# Patient Record
Sex: MALE | Race: WHITE | HISPANIC OR LATINO | Employment: OTHER | URBAN - METROPOLITAN AREA
[De-identification: names, ages, dates, MRNs, and addresses within clinical notes are randomized per-mention and may not be internally consistent; named-entity substitution may affect disease eponyms.]

---

## 2021-08-18 ENCOUNTER — TELEPHONE (OUTPATIENT)
Dept: INTERNAL MEDICINE | Facility: CLINIC | Age: 66
End: 2021-08-18

## 2021-08-19 ENCOUNTER — TELEPHONE (OUTPATIENT)
Dept: FAMILY MEDICINE | Facility: CLINIC | Age: 66
End: 2021-08-19

## 2021-08-26 DIAGNOSIS — I10 HYPERTENSION, UNSPECIFIED TYPE: ICD-10-CM

## 2021-08-26 DIAGNOSIS — D50.9 IRON DEFICIENCY ANEMIA, UNSPECIFIED IRON DEFICIENCY ANEMIA TYPE: Primary | ICD-10-CM

## 2021-08-26 DIAGNOSIS — G25.81 RESTLESS LEG SYNDROME: ICD-10-CM

## 2021-08-26 RX ORDER — GABAPENTIN 100 MG/1
100 CAPSULE ORAL 3 TIMES DAILY
Qty: 90 CAPSULE | Refills: 11 | Status: SHIPPED | OUTPATIENT
Start: 2021-08-26 | End: 2022-09-07 | Stop reason: SDUPTHER

## 2021-08-26 RX ORDER — CLONAZEPAM 0.5 MG/1
0.5 TABLET, ORALLY DISINTEGRATING ORAL 2 TIMES DAILY PRN
Qty: 60 TABLET | Refills: 1 | Status: SHIPPED | OUTPATIENT
Start: 2021-08-26 | End: 2021-10-19 | Stop reason: ALTCHOICE

## 2021-08-27 ENCOUNTER — LAB VISIT (OUTPATIENT)
Dept: LAB | Facility: HOSPITAL | Age: 66
End: 2021-08-27
Attending: INTERNAL MEDICINE
Payer: MEDICARE

## 2021-08-27 DIAGNOSIS — D50.9 IRON DEFICIENCY ANEMIA, UNSPECIFIED IRON DEFICIENCY ANEMIA TYPE: ICD-10-CM

## 2021-08-27 DIAGNOSIS — G25.81 RESTLESS LEG SYNDROME: ICD-10-CM

## 2021-08-27 DIAGNOSIS — I10 HYPERTENSION, UNSPECIFIED TYPE: ICD-10-CM

## 2021-08-27 LAB
ALBUMIN SERPL BCP-MCNC: 4 G/DL (ref 3.5–5.2)
ALP SERPL-CCNC: 69 U/L (ref 55–135)
ALT SERPL W/O P-5'-P-CCNC: 20 U/L (ref 10–44)
ANION GAP SERPL CALC-SCNC: 10 MMOL/L (ref 8–16)
AST SERPL-CCNC: 16 U/L (ref 10–40)
BASOPHILS # BLD AUTO: 0.04 K/UL (ref 0–0.2)
BASOPHILS NFR BLD: 0.5 % (ref 0–1.9)
BILIRUB SERPL-MCNC: 0.6 MG/DL (ref 0.1–1)
BUN SERPL-MCNC: 16 MG/DL (ref 8–23)
CALCIUM SERPL-MCNC: 10.1 MG/DL (ref 8.7–10.5)
CHLORIDE SERPL-SCNC: 104 MMOL/L (ref 95–110)
CO2 SERPL-SCNC: 27 MMOL/L (ref 23–29)
CREAT SERPL-MCNC: 1.3 MG/DL (ref 0.5–1.4)
DIFFERENTIAL METHOD: ABNORMAL
EOSINOPHIL # BLD AUTO: 0.1 K/UL (ref 0–0.5)
EOSINOPHIL NFR BLD: 1.8 % (ref 0–8)
ERYTHROCYTE [DISTWIDTH] IN BLOOD BY AUTOMATED COUNT: 15.9 % (ref 11.5–14.5)
EST. GFR  (AFRICAN AMERICAN): >60 ML/MIN/1.73 M^2
EST. GFR  (NON AFRICAN AMERICAN): 57 ML/MIN/1.73 M^2
FERRITIN SERPL-MCNC: 125 NG/ML (ref 20–300)
GLUCOSE SERPL-MCNC: 124 MG/DL (ref 70–110)
HCT VFR BLD AUTO: 51.5 % (ref 40–54)
HGB BLD-MCNC: 17 G/DL (ref 14–18)
IMM GRANULOCYTES # BLD AUTO: 0.02 K/UL (ref 0–0.04)
IMM GRANULOCYTES NFR BLD AUTO: 0.3 % (ref 0–0.5)
LYMPHOCYTES # BLD AUTO: 2.7 K/UL (ref 1–4.8)
LYMPHOCYTES NFR BLD: 36.6 % (ref 18–48)
MAGNESIUM SERPL-MCNC: 2.2 MG/DL (ref 1.6–2.6)
MCH RBC QN AUTO: 32.3 PG (ref 27–31)
MCHC RBC AUTO-ENTMCNC: 33 G/DL (ref 32–36)
MCV RBC AUTO: 98 FL (ref 82–98)
MONOCYTES # BLD AUTO: 0.5 K/UL (ref 0.3–1)
MONOCYTES NFR BLD: 6.2 % (ref 4–15)
NEUTROPHILS # BLD AUTO: 4.1 K/UL (ref 1.8–7.7)
NEUTROPHILS NFR BLD: 54.6 % (ref 38–73)
NRBC BLD-RTO: 0 /100 WBC
PHOSPHATE SERPL-MCNC: 2.6 MG/DL (ref 2.7–4.5)
PLATELET # BLD AUTO: 252 K/UL (ref 150–450)
PMV BLD AUTO: 9.7 FL (ref 9.2–12.9)
POTASSIUM SERPL-SCNC: 4.4 MMOL/L (ref 3.5–5.1)
PROT SERPL-MCNC: 7.7 G/DL (ref 6–8.4)
RBC # BLD AUTO: 5.27 M/UL (ref 4.6–6.2)
SODIUM SERPL-SCNC: 141 MMOL/L (ref 136–145)
WBC # BLD AUTO: 7.41 K/UL (ref 3.9–12.7)

## 2021-08-27 PROCEDURE — 82728 ASSAY OF FERRITIN: CPT | Performed by: INTERNAL MEDICINE

## 2021-08-27 PROCEDURE — 85025 COMPLETE CBC W/AUTO DIFF WBC: CPT | Performed by: INTERNAL MEDICINE

## 2021-08-27 PROCEDURE — 36415 COLL VENOUS BLD VENIPUNCTURE: CPT | Performed by: INTERNAL MEDICINE

## 2021-08-27 PROCEDURE — 84466 ASSAY OF TRANSFERRIN: CPT | Performed by: INTERNAL MEDICINE

## 2021-08-27 PROCEDURE — 80053 COMPREHEN METABOLIC PANEL: CPT | Performed by: INTERNAL MEDICINE

## 2021-08-27 PROCEDURE — 84100 ASSAY OF PHOSPHORUS: CPT | Performed by: INTERNAL MEDICINE

## 2021-08-27 PROCEDURE — 83735 ASSAY OF MAGNESIUM: CPT | Performed by: INTERNAL MEDICINE

## 2021-08-28 LAB
IRON SERPL-MCNC: 130 UG/DL (ref 45–160)
SATURATED IRON: 40 % (ref 20–50)
TOTAL IRON BINDING CAPACITY: 329 UG/DL (ref 250–450)
TRANSFERRIN SERPL-MCNC: 222 MG/DL (ref 200–375)

## 2021-10-19 ENCOUNTER — LAB VISIT (OUTPATIENT)
Dept: LAB | Facility: HOSPITAL | Age: 66
End: 2021-10-19
Attending: FAMILY MEDICINE
Payer: MEDICARE

## 2021-10-19 ENCOUNTER — OFFICE VISIT (OUTPATIENT)
Dept: INTERNAL MEDICINE | Facility: CLINIC | Age: 66
End: 2021-10-19
Attending: FAMILY MEDICINE
Payer: MEDICARE

## 2021-10-19 VITALS
OXYGEN SATURATION: 96 % | HEART RATE: 98 BPM | WEIGHT: 157.75 LBS | DIASTOLIC BLOOD PRESSURE: 78 MMHG | SYSTOLIC BLOOD PRESSURE: 136 MMHG | BODY MASS INDEX: 21.37 KG/M2 | HEIGHT: 72 IN

## 2021-10-19 DIAGNOSIS — Z12.5 PROSTATE CANCER SCREENING: ICD-10-CM

## 2021-10-19 DIAGNOSIS — M25.519 SHOULDER PAIN, UNSPECIFIED CHRONICITY, UNSPECIFIED LATERALITY: ICD-10-CM

## 2021-10-19 DIAGNOSIS — Z87.891 PERSONAL HISTORY OF NICOTINE DEPENDENCE: ICD-10-CM

## 2021-10-19 DIAGNOSIS — Z12.11 COLON CANCER SCREENING: ICD-10-CM

## 2021-10-19 DIAGNOSIS — R73.9 ELEVATED BLOOD SUGAR: ICD-10-CM

## 2021-10-19 DIAGNOSIS — E78.5 HYPERLIPIDEMIA, UNSPECIFIED HYPERLIPIDEMIA TYPE: ICD-10-CM

## 2021-10-19 DIAGNOSIS — Z13.6 SCREENING FOR AAA (AORTIC ABDOMINAL ANEURYSM): ICD-10-CM

## 2021-10-19 DIAGNOSIS — Z00.00 ANNUAL PHYSICAL EXAM: Primary | ICD-10-CM

## 2021-10-19 DIAGNOSIS — H53.9 VISUAL DISTURBANCE: ICD-10-CM

## 2021-10-19 LAB
CHOLEST SERPL-MCNC: 229 MG/DL (ref 120–199)
CHOLEST/HDLC SERPL: 7.2 {RATIO} (ref 2–5)
COMPLEXED PSA SERPL-MCNC: 2.5 NG/ML (ref 0–4)
ESTIMATED AVG GLUCOSE: 103 MG/DL (ref 68–131)
HBA1C MFR BLD: 5.2 % (ref 4–5.6)
HDLC SERPL-MCNC: 32 MG/DL (ref 40–75)
HDLC SERPL: 14 % (ref 20–50)
LDLC SERPL CALC-MCNC: 157.4 MG/DL (ref 63–159)
NONHDLC SERPL-MCNC: 197 MG/DL
TRIGL SERPL-MCNC: 198 MG/DL (ref 30–150)

## 2021-10-19 PROCEDURE — 3008F BODY MASS INDEX DOCD: CPT | Mod: CPTII,S$GLB,, | Performed by: FAMILY MEDICINE

## 2021-10-19 PROCEDURE — 3078F PR MOST RECENT DIASTOLIC BLOOD PRESSURE < 80 MM HG: ICD-10-PCS | Mod: CPTII,S$GLB,, | Performed by: FAMILY MEDICINE

## 2021-10-19 PROCEDURE — 83036 HEMOGLOBIN GLYCOSYLATED A1C: CPT | Performed by: FAMILY MEDICINE

## 2021-10-19 PROCEDURE — 3075F PR MOST RECENT SYSTOLIC BLOOD PRESS GE 130-139MM HG: ICD-10-PCS | Mod: CPTII,S$GLB,, | Performed by: FAMILY MEDICINE

## 2021-10-19 PROCEDURE — 80061 LIPID PANEL: CPT | Performed by: FAMILY MEDICINE

## 2021-10-19 PROCEDURE — 1159F PR MEDICATION LIST DOCUMENTED IN MEDICAL RECORD: ICD-10-PCS | Mod: CPTII,S$GLB,, | Performed by: FAMILY MEDICINE

## 2021-10-19 PROCEDURE — 99999 PR PBB SHADOW E&M-EST. PATIENT-LVL IV: CPT | Mod: PBBFAC,,, | Performed by: FAMILY MEDICINE

## 2021-10-19 PROCEDURE — 1125F AMNT PAIN NOTED PAIN PRSNT: CPT | Mod: CPTII,S$GLB,, | Performed by: FAMILY MEDICINE

## 2021-10-19 PROCEDURE — 1101F PT FALLS ASSESS-DOCD LE1/YR: CPT | Mod: CPTII,S$GLB,, | Performed by: FAMILY MEDICINE

## 2021-10-19 PROCEDURE — 3008F PR BODY MASS INDEX (BMI) DOCUMENTED: ICD-10-PCS | Mod: CPTII,S$GLB,, | Performed by: FAMILY MEDICINE

## 2021-10-19 PROCEDURE — 84153 ASSAY OF PSA TOTAL: CPT | Performed by: FAMILY MEDICINE

## 2021-10-19 PROCEDURE — 3288F PR FALLS RISK ASSESSMENT DOCUMENTED: ICD-10-PCS | Mod: CPTII,S$GLB,, | Performed by: FAMILY MEDICINE

## 2021-10-19 PROCEDURE — 99204 OFFICE O/P NEW MOD 45 MIN: CPT | Mod: S$GLB,,, | Performed by: FAMILY MEDICINE

## 2021-10-19 PROCEDURE — 3288F FALL RISK ASSESSMENT DOCD: CPT | Mod: CPTII,S$GLB,, | Performed by: FAMILY MEDICINE

## 2021-10-19 PROCEDURE — 1159F MED LIST DOCD IN RCRD: CPT | Mod: CPTII,S$GLB,, | Performed by: FAMILY MEDICINE

## 2021-10-19 PROCEDURE — 3075F SYST BP GE 130 - 139MM HG: CPT | Mod: CPTII,S$GLB,, | Performed by: FAMILY MEDICINE

## 2021-10-19 PROCEDURE — 99204 PR OFFICE/OUTPT VISIT, NEW, LEVL IV, 45-59 MIN: ICD-10-PCS | Mod: S$GLB,,, | Performed by: FAMILY MEDICINE

## 2021-10-19 PROCEDURE — 1160F RVW MEDS BY RX/DR IN RCRD: CPT | Mod: CPTII,S$GLB,, | Performed by: FAMILY MEDICINE

## 2021-10-19 PROCEDURE — 3078F DIAST BP <80 MM HG: CPT | Mod: CPTII,S$GLB,, | Performed by: FAMILY MEDICINE

## 2021-10-19 PROCEDURE — 99999 PR PBB SHADOW E&M-EST. PATIENT-LVL IV: ICD-10-PCS | Mod: PBBFAC,,, | Performed by: FAMILY MEDICINE

## 2021-10-19 PROCEDURE — 1101F PR PT FALLS ASSESS DOC 0-1 FALLS W/OUT INJ PAST YR: ICD-10-PCS | Mod: CPTII,S$GLB,, | Performed by: FAMILY MEDICINE

## 2021-10-19 PROCEDURE — 36415 COLL VENOUS BLD VENIPUNCTURE: CPT | Performed by: FAMILY MEDICINE

## 2021-10-19 PROCEDURE — 1160F PR REVIEW ALL MEDS BY PRESCRIBER/CLIN PHARMACIST DOCUMENTED: ICD-10-PCS | Mod: CPTII,S$GLB,, | Performed by: FAMILY MEDICINE

## 2021-10-19 PROCEDURE — 1125F PR PAIN SEVERITY QUANTIFIED, PAIN PRESENT: ICD-10-PCS | Mod: CPTII,S$GLB,, | Performed by: FAMILY MEDICINE

## 2021-10-20 ENCOUNTER — TELEPHONE (OUTPATIENT)
Dept: INTERNAL MEDICINE | Facility: CLINIC | Age: 66
End: 2021-10-20

## 2021-10-20 DIAGNOSIS — E78.5 HYPERLIPIDEMIA, UNSPECIFIED HYPERLIPIDEMIA TYPE: Primary | ICD-10-CM

## 2021-10-20 DIAGNOSIS — Z12.11 COLON CANCER SCREENING: ICD-10-CM

## 2021-10-22 ENCOUNTER — TELEPHONE (OUTPATIENT)
Dept: CARDIOTHORACIC SURGERY | Facility: CLINIC | Age: 66
End: 2021-10-22

## 2021-10-22 ENCOUNTER — HOSPITAL ENCOUNTER (OUTPATIENT)
Dept: RADIOLOGY | Facility: HOSPITAL | Age: 66
Discharge: HOME | End: 2021-10-22
Attending: PHYSICIAN ASSISTANT
Payer: MEDICARE

## 2021-10-22 ENCOUNTER — OFFICE VISIT (OUTPATIENT)
Dept: ORTHOPEDICS | Facility: CLINIC | Age: 66
End: 2021-10-22
Attending: FAMILY MEDICINE
Payer: MEDICARE

## 2021-10-22 VITALS
HEART RATE: 81 BPM | WEIGHT: 158.38 LBS | BODY MASS INDEX: 21.45 KG/M2 | SYSTOLIC BLOOD PRESSURE: 117 MMHG | DIASTOLIC BLOOD PRESSURE: 67 MMHG | HEIGHT: 72 IN

## 2021-10-22 DIAGNOSIS — M25.511 RIGHT SHOULDER PAIN, UNSPECIFIED CHRONICITY: Primary | ICD-10-CM

## 2021-10-22 DIAGNOSIS — M25.511 RIGHT SHOULDER PAIN, UNSPECIFIED CHRONICITY: ICD-10-CM

## 2021-10-22 DIAGNOSIS — M25.519 SHOULDER PAIN, UNSPECIFIED CHRONICITY, UNSPECIFIED LATERALITY: ICD-10-CM

## 2021-10-22 PROCEDURE — 3288F FALL RISK ASSESSMENT DOCD: CPT | Mod: CPTII,S$GLB,, | Performed by: PHYSICIAN ASSISTANT

## 2021-10-22 PROCEDURE — 73030 XR SHOULDER COMPLETE 2 OR MORE VIEWS RIGHT: ICD-10-PCS | Mod: 26,RT,, | Performed by: RADIOLOGY

## 2021-10-22 PROCEDURE — 3074F SYST BP LT 130 MM HG: CPT | Mod: CPTII,S$GLB,, | Performed by: PHYSICIAN ASSISTANT

## 2021-10-22 PROCEDURE — 99203 OFFICE O/P NEW LOW 30 MIN: CPT | Mod: S$GLB,,, | Performed by: PHYSICIAN ASSISTANT

## 2021-10-22 PROCEDURE — 3008F PR BODY MASS INDEX (BMI) DOCUMENTED: ICD-10-PCS | Mod: CPTII,S$GLB,, | Performed by: PHYSICIAN ASSISTANT

## 2021-10-22 PROCEDURE — 3074F PR MOST RECENT SYSTOLIC BLOOD PRESSURE < 130 MM HG: ICD-10-PCS | Mod: CPTII,S$GLB,, | Performed by: PHYSICIAN ASSISTANT

## 2021-10-22 PROCEDURE — 1125F AMNT PAIN NOTED PAIN PRSNT: CPT | Mod: CPTII,S$GLB,, | Performed by: PHYSICIAN ASSISTANT

## 2021-10-22 PROCEDURE — 1159F PR MEDICATION LIST DOCUMENTED IN MEDICAL RECORD: ICD-10-PCS | Mod: CPTII,S$GLB,, | Performed by: PHYSICIAN ASSISTANT

## 2021-10-22 PROCEDURE — 3044F HG A1C LEVEL LT 7.0%: CPT | Mod: CPTII,S$GLB,, | Performed by: PHYSICIAN ASSISTANT

## 2021-10-22 PROCEDURE — 1101F PT FALLS ASSESS-DOCD LE1/YR: CPT | Mod: CPTII,S$GLB,, | Performed by: PHYSICIAN ASSISTANT

## 2021-10-22 PROCEDURE — 3008F BODY MASS INDEX DOCD: CPT | Mod: CPTII,S$GLB,, | Performed by: PHYSICIAN ASSISTANT

## 2021-10-22 PROCEDURE — 99203 PR OFFICE/OUTPT VISIT, NEW, LEVL III, 30-44 MIN: ICD-10-PCS | Mod: S$GLB,,, | Performed by: PHYSICIAN ASSISTANT

## 2021-10-22 PROCEDURE — 1160F PR REVIEW ALL MEDS BY PRESCRIBER/CLIN PHARMACIST DOCUMENTED: ICD-10-PCS | Mod: CPTII,S$GLB,, | Performed by: PHYSICIAN ASSISTANT

## 2021-10-22 PROCEDURE — 3078F DIAST BP <80 MM HG: CPT | Mod: CPTII,S$GLB,, | Performed by: PHYSICIAN ASSISTANT

## 2021-10-22 PROCEDURE — 1101F PR PT FALLS ASSESS DOC 0-1 FALLS W/OUT INJ PAST YR: ICD-10-PCS | Mod: CPTII,S$GLB,, | Performed by: PHYSICIAN ASSISTANT

## 2021-10-22 PROCEDURE — 1160F RVW MEDS BY RX/DR IN RCRD: CPT | Mod: CPTII,S$GLB,, | Performed by: PHYSICIAN ASSISTANT

## 2021-10-22 PROCEDURE — 99999 PR PBB SHADOW E&M-EST. PATIENT-LVL III: ICD-10-PCS | Mod: PBBFAC,,, | Performed by: PHYSICIAN ASSISTANT

## 2021-10-22 PROCEDURE — 73030 X-RAY EXAM OF SHOULDER: CPT | Mod: TC,RT

## 2021-10-22 PROCEDURE — 3078F PR MOST RECENT DIASTOLIC BLOOD PRESSURE < 80 MM HG: ICD-10-PCS | Mod: CPTII,S$GLB,, | Performed by: PHYSICIAN ASSISTANT

## 2021-10-22 PROCEDURE — 1125F PR PAIN SEVERITY QUANTIFIED, PAIN PRESENT: ICD-10-PCS | Mod: CPTII,S$GLB,, | Performed by: PHYSICIAN ASSISTANT

## 2021-10-22 PROCEDURE — 73030 X-RAY EXAM OF SHOULDER: CPT | Mod: 26,RT,, | Performed by: RADIOLOGY

## 2021-10-22 PROCEDURE — 1159F MED LIST DOCD IN RCRD: CPT | Mod: CPTII,S$GLB,, | Performed by: PHYSICIAN ASSISTANT

## 2021-10-22 PROCEDURE — 3044F PR MOST RECENT HEMOGLOBIN A1C LEVEL <7.0%: ICD-10-PCS | Mod: CPTII,S$GLB,, | Performed by: PHYSICIAN ASSISTANT

## 2021-10-22 PROCEDURE — 99999 PR PBB SHADOW E&M-EST. PATIENT-LVL III: CPT | Mod: PBBFAC,,, | Performed by: PHYSICIAN ASSISTANT

## 2021-10-22 PROCEDURE — 3288F PR FALLS RISK ASSESSMENT DOCUMENTED: ICD-10-PCS | Mod: CPTII,S$GLB,, | Performed by: PHYSICIAN ASSISTANT

## 2021-10-22 RX ORDER — MELOXICAM 15 MG/1
15 TABLET ORAL DAILY
Qty: 30 TABLET | Refills: 1 | Status: SHIPPED | OUTPATIENT
Start: 2021-10-22 | End: 2021-11-24

## 2021-10-26 ENCOUNTER — HOSPITAL ENCOUNTER (OUTPATIENT)
Dept: RADIOLOGY | Facility: HOSPITAL | Age: 66
Discharge: HOME | End: 2021-10-26
Attending: FAMILY MEDICINE
Payer: MEDICARE

## 2021-10-26 DIAGNOSIS — Z87.891 PERSONAL HISTORY OF NICOTINE DEPENDENCE: ICD-10-CM

## 2021-10-26 PROCEDURE — 71271 CT CHEST LUNG SCREENING LOW DOSE: ICD-10-PCS | Mod: 26,,, | Performed by: RADIOLOGY

## 2021-10-26 PROCEDURE — 71271 CT THORAX LUNG CANCER SCR C-: CPT | Mod: 26,,, | Performed by: RADIOLOGY

## 2021-10-26 PROCEDURE — 71271 CT THORAX LUNG CANCER SCR C-: CPT | Mod: TC

## 2021-10-29 ENCOUNTER — HOSPITAL ENCOUNTER (OUTPATIENT)
Dept: CARDIOLOGY | Facility: HOSPITAL | Age: 66
Discharge: HOME | End: 2021-10-29
Attending: FAMILY MEDICINE
Payer: MEDICARE

## 2021-10-29 ENCOUNTER — IMMUNIZATION (OUTPATIENT)
Dept: INTERNAL MEDICINE | Facility: CLINIC | Age: 66
End: 2021-10-29
Payer: MEDICARE

## 2021-10-29 DIAGNOSIS — Z13.6 SCREENING FOR AAA (AORTIC ABDOMINAL ANEURYSM): ICD-10-CM

## 2021-10-29 DIAGNOSIS — Z23 NEED FOR VACCINATION: Primary | ICD-10-CM

## 2021-10-29 LAB
ABDOMINAL IMA AP: 1.6 CM
ABDOMINAL IMA ED VEL: 0 CM/S
ABDOMINAL IMA PS VEL: 135 CM/S
ABDOMINAL IMA TRANS: 1.3 CM
ABDOMINAL INFRARENAL AORTA AP: 2 CM
ABDOMINAL INFRARENAL AORTA ED VEL: 0 CM/S
ABDOMINAL INFRARENAL AORTA PS VEL: 67 CM/S
ABDOMINAL INFRARENAL AORTA TRANS: 1.6 CM
ABDOMINAL JUXTARENAL AORTA AP: 2 CM
ABDOMINAL JUXTARENAL AORTA ED VEL: 15 CM/S
ABDOMINAL JUXTARENAL AORTA PS VEL: 69 CM/S
ABDOMINAL JUXTARENAL AORTA TRANS: 1.6 CM
ABDOMINAL LT COM ILIAC AP: 1 CM
ABDOMINAL LT COM ILIAC TRANS: 0.9 CM
ABDOMINAL LT COM ILIAC VEL: 152 CM/S
ABDOMINAL LT COM ILLIAC ED VEL: 0 CM/S
ABDOMINAL RT COM ILIAC AP: 1.1 CM
ABDOMINAL RT COM ILIAC TRANS: 0.8 CM
ABDOMINAL RT COM ILIAC VEL: 267 CM/S
ABDOMINAL RT COM ILLIAC ED VEL: 0 CM/S
ABDOMINAL SUPRARENAL AORTA AP: 2.3 CM
ABDOMINAL SUPRARENAL AORTA ED VEL: 18 CM/S
ABDOMINAL SUPRARENAL AORTA PS VEL: 73 CM/S
ABDOMINAL SUPRARENAL AORTA TRANS: 2 CM

## 2021-10-29 PROCEDURE — 0013A COVID-19, MRNA, LNP-S, PF, 100 MCG/0.5 ML DOSE VACCINE: CPT | Mod: PBBFAC | Performed by: FAMILY MEDICINE

## 2021-10-29 PROCEDURE — 93978 VASCULAR STUDY: CPT

## 2021-10-29 PROCEDURE — 91301 COVID-19, MRNA, LNP-S, PF, 100 MCG/0.5 ML DOSE VACCINE: CPT | Mod: PBBFAC | Performed by: FAMILY MEDICINE

## 2021-10-29 PROCEDURE — 93978 VASCULAR STUDY: CPT | Mod: 26,,, | Performed by: INTERNAL MEDICINE

## 2021-10-29 PROCEDURE — 93978 CV US ABDOMINAL AORTA EVALUATION (CUPID ONLY): ICD-10-PCS | Mod: 26,,, | Performed by: INTERNAL MEDICINE

## 2021-11-01 ENCOUNTER — TELEPHONE (OUTPATIENT)
Dept: ENDOSCOPY | Facility: HOSPITAL | Age: 66
End: 2021-11-01
Payer: MEDICARE

## 2021-11-08 ENCOUNTER — CLINICAL SUPPORT (OUTPATIENT)
Dept: REHABILITATION | Facility: HOSPITAL | Age: 66
End: 2021-11-08
Payer: MEDICARE

## 2021-11-08 DIAGNOSIS — M25.519 SHOULDER PAIN, UNSPECIFIED CHRONICITY, UNSPECIFIED LATERALITY: ICD-10-CM

## 2021-11-08 DIAGNOSIS — M25.511 RIGHT SHOULDER PAIN, UNSPECIFIED CHRONICITY: ICD-10-CM

## 2021-11-08 DIAGNOSIS — Z74.09 STIFFNESS DUE TO IMMOBILITY: ICD-10-CM

## 2021-11-08 DIAGNOSIS — M25.60 STIFFNESS DUE TO IMMOBILITY: ICD-10-CM

## 2021-11-08 DIAGNOSIS — R53.1 WEAKNESS: ICD-10-CM

## 2021-11-08 DIAGNOSIS — M79.601 PAIN OF RIGHT UPPER EXTREMITY: ICD-10-CM

## 2021-11-08 PROCEDURE — 97165 OT EVAL LOW COMPLEX 30 MIN: CPT | Mod: PN

## 2021-11-17 ENCOUNTER — CLINICAL SUPPORT (OUTPATIENT)
Dept: REHABILITATION | Facility: HOSPITAL | Age: 66
End: 2021-11-17
Payer: MEDICARE

## 2021-11-17 DIAGNOSIS — M79.601 PAIN OF RIGHT UPPER EXTREMITY: ICD-10-CM

## 2021-11-17 DIAGNOSIS — R53.1 WEAKNESS: ICD-10-CM

## 2021-11-17 DIAGNOSIS — M25.60 STIFFNESS DUE TO IMMOBILITY: ICD-10-CM

## 2021-11-17 DIAGNOSIS — Z74.09 STIFFNESS DUE TO IMMOBILITY: ICD-10-CM

## 2021-11-17 PROCEDURE — 97140 MANUAL THERAPY 1/> REGIONS: CPT | Mod: PN

## 2021-11-17 PROCEDURE — 97110 THERAPEUTIC EXERCISES: CPT | Mod: PN

## 2021-11-22 ENCOUNTER — OFFICE VISIT (OUTPATIENT)
Dept: INTERNAL MEDICINE | Facility: CLINIC | Age: 66
End: 2021-11-22
Attending: FAMILY MEDICINE
Payer: MEDICARE

## 2021-11-22 ENCOUNTER — CLINICAL SUPPORT (OUTPATIENT)
Dept: REHABILITATION | Facility: HOSPITAL | Age: 66
End: 2021-11-22
Payer: MEDICARE

## 2021-11-22 VITALS
OXYGEN SATURATION: 99 % | SYSTOLIC BLOOD PRESSURE: 136 MMHG | BODY MASS INDEX: 21.56 KG/M2 | HEART RATE: 77 BPM | HEIGHT: 72 IN | WEIGHT: 159.19 LBS | DIASTOLIC BLOOD PRESSURE: 84 MMHG

## 2021-11-22 DIAGNOSIS — R07.9 CHEST PAIN, UNSPECIFIED TYPE: ICD-10-CM

## 2021-11-22 DIAGNOSIS — E78.5 HYPERLIPIDEMIA, UNSPECIFIED HYPERLIPIDEMIA TYPE: Primary | ICD-10-CM

## 2021-11-22 DIAGNOSIS — I77.1 ILIAC ARTERY STENOSIS, RIGHT: ICD-10-CM

## 2021-11-22 DIAGNOSIS — Z74.09 STIFFNESS DUE TO IMMOBILITY: ICD-10-CM

## 2021-11-22 DIAGNOSIS — M79.601 PAIN OF RIGHT UPPER EXTREMITY: ICD-10-CM

## 2021-11-22 DIAGNOSIS — I25.10 CORONARY ARTERY CALCIFICATION SEEN ON CAT SCAN: ICD-10-CM

## 2021-11-22 DIAGNOSIS — R53.1 WEAKNESS: ICD-10-CM

## 2021-11-22 DIAGNOSIS — Z87.891 PERSONAL HISTORY OF NICOTINE DEPENDENCE: ICD-10-CM

## 2021-11-22 DIAGNOSIS — M25.60 STIFFNESS DUE TO IMMOBILITY: ICD-10-CM

## 2021-11-22 PROCEDURE — 97110 THERAPEUTIC EXERCISES: CPT | Mod: PN

## 2021-11-22 PROCEDURE — 99999 PR PBB SHADOW E&M-EST. PATIENT-LVL III: ICD-10-PCS | Mod: PBBFAC,,, | Performed by: FAMILY MEDICINE

## 2021-11-22 PROCEDURE — 99499 RISK ADDL DX/OHS AUDIT: ICD-10-PCS | Mod: S$GLB,,, | Performed by: FAMILY MEDICINE

## 2021-11-22 PROCEDURE — 99499 UNLISTED E&M SERVICE: CPT | Mod: S$GLB,,, | Performed by: FAMILY MEDICINE

## 2021-11-22 PROCEDURE — 99214 OFFICE O/P EST MOD 30 MIN: CPT | Mod: S$GLB,,, | Performed by: FAMILY MEDICINE

## 2021-11-22 PROCEDURE — 99214 PR OFFICE/OUTPT VISIT, EST, LEVL IV, 30-39 MIN: ICD-10-PCS | Mod: S$GLB,,, | Performed by: FAMILY MEDICINE

## 2021-11-22 PROCEDURE — 97140 MANUAL THERAPY 1/> REGIONS: CPT | Mod: PN

## 2021-11-22 PROCEDURE — 99999 PR PBB SHADOW E&M-EST. PATIENT-LVL III: CPT | Mod: PBBFAC,,, | Performed by: FAMILY MEDICINE

## 2021-11-22 RX ORDER — ASPIRIN 81 MG/1
81 TABLET ORAL DAILY
COMMUNITY
End: 2022-02-15

## 2021-11-22 RX ORDER — ATORVASTATIN CALCIUM 40 MG/1
40 TABLET, FILM COATED ORAL DAILY
Qty: 90 TABLET | Refills: 3 | Status: SHIPPED | OUTPATIENT
Start: 2021-11-22 | End: 2022-02-15 | Stop reason: SDUPTHER

## 2021-11-23 ENCOUNTER — HOSPITAL ENCOUNTER (OUTPATIENT)
Dept: CARDIOLOGY | Facility: CLINIC | Age: 66
Discharge: HOME | End: 2021-11-23
Attending: FAMILY MEDICINE
Payer: MEDICARE

## 2021-11-23 DIAGNOSIS — R07.9 CHEST PAIN, UNSPECIFIED TYPE: ICD-10-CM

## 2021-11-23 DIAGNOSIS — I25.10 CORONARY ARTERY CALCIFICATION SEEN ON CAT SCAN: ICD-10-CM

## 2021-11-23 PROCEDURE — 93005 EKG 12-LEAD: ICD-10-PCS | Mod: S$GLB,,, | Performed by: FAMILY MEDICINE

## 2021-11-23 PROCEDURE — 93005 ELECTROCARDIOGRAM TRACING: CPT | Mod: S$GLB,,, | Performed by: FAMILY MEDICINE

## 2021-11-23 PROCEDURE — 93010 ELECTROCARDIOGRAM REPORT: CPT | Mod: S$GLB,,, | Performed by: INTERNAL MEDICINE

## 2021-11-23 PROCEDURE — 93010 EKG 12-LEAD: ICD-10-PCS | Mod: S$GLB,,, | Performed by: INTERNAL MEDICINE

## 2021-11-24 ENCOUNTER — CLINICAL SUPPORT (OUTPATIENT)
Dept: REHABILITATION | Facility: HOSPITAL | Age: 66
End: 2021-11-24
Payer: MEDICARE

## 2021-11-24 DIAGNOSIS — M25.60 STIFFNESS DUE TO IMMOBILITY: ICD-10-CM

## 2021-11-24 DIAGNOSIS — M79.601 PAIN OF RIGHT UPPER EXTREMITY: ICD-10-CM

## 2021-11-24 DIAGNOSIS — R53.1 WEAKNESS: ICD-10-CM

## 2021-11-24 DIAGNOSIS — Z74.09 STIFFNESS DUE TO IMMOBILITY: ICD-10-CM

## 2021-11-24 PROCEDURE — 97110 THERAPEUTIC EXERCISES: CPT | Mod: PN

## 2021-11-24 PROCEDURE — 97140 MANUAL THERAPY 1/> REGIONS: CPT | Mod: PN

## 2021-11-26 ENCOUNTER — HOSPITAL ENCOUNTER (OUTPATIENT)
Dept: CARDIOLOGY | Facility: HOSPITAL | Age: 66
Discharge: HOME | End: 2021-11-26
Attending: FAMILY MEDICINE
Payer: MEDICARE

## 2021-11-26 VITALS — WEIGHT: 159 LBS | HEIGHT: 72 IN | BODY MASS INDEX: 21.54 KG/M2

## 2021-11-26 DIAGNOSIS — R07.9 CHEST PAIN, UNSPECIFIED TYPE: ICD-10-CM

## 2021-11-26 DIAGNOSIS — I25.10 CORONARY ARTERY CALCIFICATION SEEN ON CAT SCAN: ICD-10-CM

## 2021-11-26 LAB
AORTIC ROOT ANNULUS: 3.12 CM
AV INDEX (PROSTH): 0.82
AV MEAN GRADIENT: 4 MMHG
AV PEAK GRADIENT: 7 MMHG
AV VALVE AREA: 2.79 CM2
AV VELOCITY RATIO: 0.76
BSA FOR ECHO PROCEDURE: 1.91 M2
CV ECHO LV RWT: 0.4 CM
CV STRESS BASE HR: 86 BPM
DIASTOLIC BLOOD PRESSURE: 95 MMHG
DOP CALC AO PEAK VEL: 1.32 M/S
DOP CALC AO VTI: 25.86 CM
DOP CALC LVOT AREA: 3.4 CM2
DOP CALC LVOT DIAMETER: 2.08 CM
DOP CALC LVOT PEAK VEL: 1 M/S
DOP CALC LVOT STROKE VOLUME: 72.2 CM3
DOP CALC MV VTI: 17.37 CM
DOP CALCLVOT PEAK VEL VTI: 21.26 CM
E WAVE DECELERATION TIME: 133.34 MSEC
E/A RATIO: 0.81
E/E' RATIO: 7.3 M/S
ECHO LV POSTERIOR WALL: 0.96 CM (ref 0.6–1.1)
EJECTION FRACTION: 55 %
FRACTIONAL SHORTENING: 34 % (ref 28–44)
INTERVENTRICULAR SEPTUM: 0.97 CM (ref 0.6–1.1)
LA MAJOR: 4.13 CM
LA MINOR: 4.28 CM
LA WIDTH: 3.11 CM
LEFT ATRIUM SIZE: 2.83 CM
LEFT ATRIUM VOLUME INDEX MOD: 11.1 ML/M2
LEFT ATRIUM VOLUME INDEX: 16.3 ML/M2
LEFT ATRIUM VOLUME MOD: 21.49 CM3
LEFT ATRIUM VOLUME: 31.45 CM3
LEFT INTERNAL DIMENSION IN SYSTOLE: 3.17 CM (ref 2.1–4)
LEFT VENTRICLE DIASTOLIC VOLUME INDEX: 56.63 ML/M2
LEFT VENTRICLE DIASTOLIC VOLUME: 109.3 ML
LEFT VENTRICLE MASS INDEX: 85 G/M2
LEFT VENTRICLE SYSTOLIC VOLUME INDEX: 20.8 ML/M2
LEFT VENTRICLE SYSTOLIC VOLUME: 40.14 ML
LEFT VENTRICULAR INTERNAL DIMENSION IN DIASTOLE: 4.83 CM (ref 3.5–6)
LEFT VENTRICULAR MASS: 163.87 G
LV LATERAL E/E' RATIO: 5.62 M/S
LV SEPTAL E/E' RATIO: 10.43 M/S
MV MEAN GRADIENT: 1 MMHG
MV PEAK A VEL: 0.9 M/S
MV PEAK E VEL: 0.73 M/S
MV PEAK GRADIENT: 3 MMHG
MV STENOSIS PRESSURE HALF TIME: 38.67 MS
MV VALVE AREA BY CONTINUITY EQUATION: 4.16 CM2
MV VALVE AREA P 1/2 METHOD: 5.69 CM2
OHS CV CPX 1 MINUTE RECOVERY HEART RATE: 127 BPM
OHS CV CPX 85 PERCENT MAX PREDICTED HEART RATE MALE: 131
OHS CV CPX ESTIMATED METS: 10
OHS CV CPX MAX PREDICTED HEART RATE: 154
OHS CV CPX PATIENT IS FEMALE: 0
OHS CV CPX PATIENT IS MALE: 1
OHS CV CPX PEAK DIASTOLIC BLOOD PRESSURE: 110 MMHG
OHS CV CPX PEAK HEAR RATE: 155 BPM
OHS CV CPX PEAK RATE PRESSURE PRODUCT: NORMAL
OHS CV CPX PEAK SYSTOLIC BLOOD PRESSURE: 195 MMHG
OHS CV CPX PERCENT MAX PREDICTED HEART RATE ACHIEVED: 101
OHS CV CPX RATE PRESSURE PRODUCT PRESENTING: NORMAL
PISA TR MAX VEL: 2.51 M/S
PV PEAK VELOCITY: 1.55 CM/S
RA MAJOR: 4.77 CM
RA PRESSURE: 3 MMHG
RA WIDTH: 3.62 CM
RIGHT VENTRICULAR END-DIASTOLIC DIMENSION: 2.16 CM
RV TISSUE DOPPLER FREE WALL SYSTOLIC VELOCITY 1 (APICAL 4 CHAMBER VIEW): 13.81 CM/S
STRESS ANGINA INDEX: 0
STRESS ECHO POST EXERCISE DUR MIN: 8 MINUTES
STRESS ECHO POST EXERCISE DUR SEC: 19 SECONDS
SYSTOLIC BLOOD PRESSURE: 151 MMHG
TDI LATERAL: 0.13 M/S
TDI SEPTAL: 0.07 M/S
TDI: 0.1 M/S
TR MAX PG: 25 MMHG
TRICUSPID ANNULAR PLANE SYSTOLIC EXCURSION: 2.92 CM
TV REST PULMONARY ARTERY PRESSURE: 28 MMHG

## 2021-11-26 PROCEDURE — 93351 STRESS TTE COMPLETE: CPT | Mod: 26,,, | Performed by: INTERNAL MEDICINE

## 2021-11-26 PROCEDURE — 93320 DOPPLER ECHO COMPLETE: CPT | Mod: 26,,, | Performed by: INTERNAL MEDICINE

## 2021-11-26 PROCEDURE — 93351 STRESS ECHO (CUPID ONLY): ICD-10-PCS | Mod: 26,,, | Performed by: INTERNAL MEDICINE

## 2021-11-26 PROCEDURE — 93320 STRESS ECHO (CUPID ONLY): ICD-10-PCS | Mod: 26,,, | Performed by: INTERNAL MEDICINE

## 2021-11-26 PROCEDURE — 93325 STRESS ECHO (CUPID ONLY): ICD-10-PCS | Mod: 26,,, | Performed by: INTERNAL MEDICINE

## 2021-11-26 PROCEDURE — 93325 DOPPLER ECHO COLOR FLOW MAPG: CPT

## 2021-11-26 PROCEDURE — 93325 DOPPLER ECHO COLOR FLOW MAPG: CPT | Mod: 26,,, | Performed by: INTERNAL MEDICINE

## 2021-11-27 ENCOUNTER — TELEPHONE (OUTPATIENT)
Dept: INTERNAL MEDICINE | Facility: CLINIC | Age: 66
End: 2021-11-27
Payer: MEDICARE

## 2021-11-27 DIAGNOSIS — R94.39 POSITIVE CARDIAC STRESS TEST: Primary | ICD-10-CM

## 2021-11-28 ENCOUNTER — TELEPHONE (OUTPATIENT)
Dept: INTERNAL MEDICINE | Facility: CLINIC | Age: 66
End: 2021-11-28
Payer: MEDICARE

## 2021-11-29 ENCOUNTER — OFFICE VISIT (OUTPATIENT)
Dept: CARDIOLOGY | Facility: CLINIC | Age: 66
End: 2021-11-29
Attending: FAMILY MEDICINE
Payer: MEDICARE

## 2021-11-29 ENCOUNTER — PATIENT OUTREACH (OUTPATIENT)
Dept: ADMINISTRATIVE | Facility: OTHER | Age: 66
End: 2021-11-29
Payer: MEDICARE

## 2021-11-29 VITALS
HEART RATE: 85 BPM | DIASTOLIC BLOOD PRESSURE: 70 MMHG | BODY MASS INDEX: 21.77 KG/M2 | WEIGHT: 160.69 LBS | RESPIRATION RATE: 20 BRPM | SYSTOLIC BLOOD PRESSURE: 144 MMHG | HEIGHT: 72 IN

## 2021-11-29 DIAGNOSIS — I10 PRIMARY HYPERTENSION: ICD-10-CM

## 2021-11-29 DIAGNOSIS — R94.31 ABNORMAL ELECTROCARDIOGRAM (ECG) (EKG): ICD-10-CM

## 2021-11-29 DIAGNOSIS — R94.39 POSITIVE CARDIAC STRESS TEST: ICD-10-CM

## 2021-11-29 DIAGNOSIS — I25.10 CORONARY ARTERY DISEASE INVOLVING NATIVE CORONARY ARTERY OF NATIVE HEART WITHOUT ANGINA PECTORIS: Primary | ICD-10-CM

## 2021-11-29 DIAGNOSIS — E78.5 HYPERLIPIDEMIA, UNSPECIFIED HYPERLIPIDEMIA TYPE: ICD-10-CM

## 2021-11-29 PROCEDURE — 99999 PR PBB SHADOW E&M-EST. PATIENT-LVL III: ICD-10-PCS | Mod: PBBFAC,,, | Performed by: INTERNAL MEDICINE

## 2021-11-29 PROCEDURE — 99205 OFFICE O/P NEW HI 60 MIN: CPT | Mod: S$GLB,,, | Performed by: INTERNAL MEDICINE

## 2021-11-29 PROCEDURE — 99999 PR PBB SHADOW E&M-EST. PATIENT-LVL III: CPT | Mod: PBBFAC,,, | Performed by: INTERNAL MEDICINE

## 2021-11-29 PROCEDURE — 99205 PR OFFICE/OUTPT VISIT, NEW, LEVL V, 60-74 MIN: ICD-10-PCS | Mod: S$GLB,,, | Performed by: INTERNAL MEDICINE

## 2021-11-29 RX ORDER — CARVEDILOL 6.25 MG/1
6.25 TABLET ORAL 2 TIMES DAILY WITH MEALS
Qty: 60 TABLET | Refills: 6 | Status: SHIPPED | OUTPATIENT
Start: 2021-11-29 | End: 2022-02-15 | Stop reason: SDUPTHER

## 2021-12-03 ENCOUNTER — PATIENT OUTREACH (OUTPATIENT)
Dept: ADMINISTRATIVE | Facility: HOSPITAL | Age: 66
End: 2021-12-03
Payer: MEDICARE

## 2021-12-03 ENCOUNTER — HOSPITAL ENCOUNTER (OUTPATIENT)
Dept: RADIOLOGY | Facility: HOSPITAL | Age: 66
Discharge: HOME | End: 2021-12-03
Attending: INTERNAL MEDICINE
Payer: MEDICARE

## 2021-12-03 VITALS
SYSTOLIC BLOOD PRESSURE: 120 MMHG | DIASTOLIC BLOOD PRESSURE: 64 MMHG | OXYGEN SATURATION: 100 % | RESPIRATION RATE: 18 BRPM | HEART RATE: 62 BPM

## 2021-12-03 DIAGNOSIS — R94.31 ABNORMAL ELECTROCARDIOGRAM (ECG) (EKG): ICD-10-CM

## 2021-12-03 PROCEDURE — 25500020 PHARM REV CODE 255: Performed by: INTERNAL MEDICINE

## 2021-12-03 PROCEDURE — 75574 CT ANGIO HRT W/3D IMAGE: CPT | Mod: 26,,, | Performed by: RADIOLOGY

## 2021-12-03 PROCEDURE — 75574 CTA CARDIAC: ICD-10-PCS | Mod: 26,,, | Performed by: RADIOLOGY

## 2021-12-03 PROCEDURE — 75574 CT ANGIO HRT W/3D IMAGE: CPT | Mod: TC

## 2021-12-03 RX ORDER — NITROGLYCERIN 0.4 MG/1
0.4 TABLET SUBLINGUAL EVERY 5 MIN PRN
Status: DISCONTINUED | OUTPATIENT
Start: 2021-12-03 | End: 2021-12-04 | Stop reason: HOSPADM

## 2021-12-03 RX ORDER — METOPROLOL TARTRATE 1 MG/ML
30 INJECTION, SOLUTION INTRAVENOUS EVERY 5 MIN PRN
Status: DISCONTINUED | OUTPATIENT
Start: 2021-12-03 | End: 2021-12-04 | Stop reason: HOSPADM

## 2021-12-03 RX ADMIN — IOHEXOL 100 ML: 350 INJECTION, SOLUTION INTRAVENOUS at 09:12

## 2021-12-09 ENCOUNTER — TELEPHONE (OUTPATIENT)
Dept: CARDIOLOGY | Facility: CLINIC | Age: 66
End: 2021-12-09
Payer: MEDICARE

## 2021-12-17 ENCOUNTER — TELEPHONE (OUTPATIENT)
Dept: ENDOSCOPY | Facility: HOSPITAL | Age: 66
End: 2021-12-17
Payer: MEDICARE

## 2021-12-20 ENCOUNTER — TELEPHONE (OUTPATIENT)
Dept: GASTROENTEROLOGY | Facility: CLINIC | Age: 66
End: 2021-12-20
Payer: MEDICARE

## 2021-12-22 ENCOUNTER — CLINICAL SUPPORT (OUTPATIENT)
Dept: REHABILITATION | Facility: HOSPITAL | Age: 66
End: 2021-12-22
Payer: MEDICARE

## 2021-12-22 DIAGNOSIS — Z74.09 STIFFNESS DUE TO IMMOBILITY: ICD-10-CM

## 2021-12-22 DIAGNOSIS — M25.60 STIFFNESS DUE TO IMMOBILITY: ICD-10-CM

## 2021-12-22 DIAGNOSIS — R53.1 WEAKNESS: ICD-10-CM

## 2021-12-22 DIAGNOSIS — M79.601 PAIN OF RIGHT UPPER EXTREMITY: ICD-10-CM

## 2021-12-22 PROCEDURE — 97140 MANUAL THERAPY 1/> REGIONS: CPT | Mod: PN

## 2021-12-22 PROCEDURE — 97110 THERAPEUTIC EXERCISES: CPT | Mod: PN

## 2021-12-30 ENCOUNTER — TELEPHONE (OUTPATIENT)
Dept: ENDOSCOPY | Facility: HOSPITAL | Age: 66
End: 2021-12-30
Payer: MEDICARE

## 2021-12-30 DIAGNOSIS — Z01.818 PRE-OP TESTING: ICD-10-CM

## 2022-01-03 ENCOUNTER — TELEPHONE (OUTPATIENT)
Dept: ENDOSCOPY | Facility: HOSPITAL | Age: 67
End: 2022-01-03
Payer: MEDICARE

## 2022-01-03 NOTE — TELEPHONE ENCOUNTER
Left message with assistance of  Shell [id number] instructing patient to call dept @ [phone] between 8am-4pm.    Arrival time to be given @ 0900  (Message sent via [facility] portal)

## 2022-01-03 NOTE — TELEPHONE ENCOUNTER
Spoke with patient with assistance of  Shiv [id number]  about arrival time @ 0910  Covid test =  Boosted     Prep instructions reviewed: the day before the procedure, follow a clear liquid diet all day, then start the first 1/2 of prep at 5pm and take 2nd 1/2 of prep @0410  Pt must be completely NPO when prep completed @ 0610             Medications: Do not take Insulin or oral diabetic medications the day of the procedure.  Take as prescribed: heart, seizure and blood pressure medication in the morning with a sip of water (less than an ounce).  Take any breathing medications and bring inhalers to hospital with you Leave all valuables and jewelry at home.     Wear comfortable clothes to procedure to change into hospital gown You cannot drive for 24 hours after your procedure because you will receive sedation for your procedure to make you comfortable.  A ride must be provided at discharge.

## 2022-01-05 ENCOUNTER — HOSPITAL ENCOUNTER (OUTPATIENT)
Facility: HOSPITAL | Age: 67
Discharge: HOME | End: 2022-01-05
Attending: INTERNAL MEDICINE | Admitting: INTERNAL MEDICINE
Payer: MEDICARE

## 2022-01-05 ENCOUNTER — ANESTHESIA (OUTPATIENT)
Dept: ENDOSCOPY | Facility: HOSPITAL | Age: 67
End: 2022-01-05

## 2022-01-05 ENCOUNTER — ANESTHESIA EVENT (OUTPATIENT)
Dept: ENDOSCOPY | Facility: HOSPITAL | Age: 67
End: 2022-01-05

## 2022-01-05 VITALS
HEIGHT: 72 IN | TEMPERATURE: 98 F | SYSTOLIC BLOOD PRESSURE: 105 MMHG | DIASTOLIC BLOOD PRESSURE: 56 MMHG | WEIGHT: 176 LBS | BODY MASS INDEX: 23.84 KG/M2 | HEART RATE: 68 BPM | RESPIRATION RATE: 15 BRPM | OXYGEN SATURATION: 98 %

## 2022-01-05 DIAGNOSIS — Z12.11 COLON CANCER SCREENING: ICD-10-CM

## 2022-01-05 PROBLEM — K63.5 COLON POLYPS: Status: ACTIVE | Noted: 2022-01-05

## 2022-01-05 PROCEDURE — 27201089 HC SNARE, DISP (ANY): Performed by: INTERNAL MEDICINE

## 2022-01-05 PROCEDURE — 45385 COLONOSCOPY W/LESION REMOVAL: CPT | Mod: PT | Performed by: INTERNAL MEDICINE

## 2022-01-05 PROCEDURE — 63600175 PHARM REV CODE 636 W HCPCS: Performed by: NURSE ANESTHETIST, CERTIFIED REGISTERED

## 2022-01-05 PROCEDURE — 25000003 PHARM REV CODE 250: Performed by: INTERNAL MEDICINE

## 2022-01-05 PROCEDURE — 88305 TISSUE EXAM BY PATHOLOGIST: CPT | Mod: 26,,, | Performed by: PATHOLOGY

## 2022-01-05 PROCEDURE — 45385 PR COLONOSCOPY,REMV LESN,SNARE: ICD-10-PCS | Mod: PT,,, | Performed by: INTERNAL MEDICINE

## 2022-01-05 PROCEDURE — 45385 COLONOSCOPY W/LESION REMOVAL: CPT | Mod: PT,,, | Performed by: INTERNAL MEDICINE

## 2022-01-05 PROCEDURE — 37000009 HC ANESTHESIA EA ADD 15 MINS: Performed by: INTERNAL MEDICINE

## 2022-01-05 PROCEDURE — 27202363 HC INJECTION AGENT, SUBMUCOSAL, ANY: Performed by: INTERNAL MEDICINE

## 2022-01-05 PROCEDURE — 88305 TISSUE EXAM BY PATHOLOGIST: ICD-10-PCS | Mod: 26,,, | Performed by: PATHOLOGY

## 2022-01-05 PROCEDURE — 37000008 HC ANESTHESIA 1ST 15 MINUTES: Performed by: INTERNAL MEDICINE

## 2022-01-05 PROCEDURE — 27201028 HC NEEDLE, SCLERO: Performed by: INTERNAL MEDICINE

## 2022-01-05 PROCEDURE — 25000003 PHARM REV CODE 250: Performed by: NURSE ANESTHETIST, CERTIFIED REGISTERED

## 2022-01-05 PROCEDURE — 88305 TISSUE EXAM BY PATHOLOGIST: CPT | Performed by: PATHOLOGY

## 2022-01-05 RX ORDER — PROPOFOL 10 MG/ML
VIAL (ML) INTRAVENOUS
Status: DISCONTINUED | OUTPATIENT
Start: 2022-01-05 | End: 2022-01-05

## 2022-01-05 RX ORDER — DEXTROMETHORPHAN/PSEUDOEPHED 2.5-7.5/.8
DROPS ORAL
Status: COMPLETED | OUTPATIENT
Start: 2022-01-05 | End: 2022-01-05

## 2022-01-05 RX ORDER — LIDOCAINE HYDROCHLORIDE 20 MG/ML
INJECTION INTRAVENOUS
Status: DISCONTINUED | OUTPATIENT
Start: 2022-01-05 | End: 2022-01-05

## 2022-01-05 RX ORDER — SODIUM CHLORIDE 9 MG/ML
INJECTION, SOLUTION INTRAVENOUS CONTINUOUS
Status: DISCONTINUED | OUTPATIENT
Start: 2022-01-05 | End: 2022-01-05 | Stop reason: HOSPADM

## 2022-01-05 RX ORDER — SODIUM CHLORIDE 0.9 % (FLUSH) 0.9 %
10 SYRINGE (ML) INJECTION
Status: DISCONTINUED | OUTPATIENT
Start: 2022-01-05 | End: 2022-01-05 | Stop reason: HOSPADM

## 2022-01-05 RX ORDER — PROPOFOL 10 MG/ML
VIAL (ML) INTRAVENOUS CONTINUOUS PRN
Status: DISCONTINUED | OUTPATIENT
Start: 2022-01-05 | End: 2022-01-05

## 2022-01-05 RX ADMIN — SODIUM CHLORIDE: 0.9 INJECTION, SOLUTION INTRAVENOUS at 10:01

## 2022-01-05 RX ADMIN — PROPOFOL 100 MG: 10 INJECTION, EMULSION INTRAVENOUS at 10:01

## 2022-01-05 RX ADMIN — LIDOCAINE HYDROCHLORIDE 100 MG: 20 INJECTION, SOLUTION INTRAVENOUS at 10:01

## 2022-01-05 RX ADMIN — SIMETHICONE 66 MG: 20 SUSPENSION/ DROPS ORAL at 10:01

## 2022-01-05 RX ADMIN — PROPOFOL 150 MCG/KG/MIN: 10 INJECTION, EMULSION INTRAVENOUS at 10:01

## 2022-01-05 NOTE — TRANSFER OF CARE
Anesthesia Transfer of Care Note    Patient: Guillaume Salinas    Procedure(s) Performed: Procedure(s) (LRB):  COLONOSCOPY Golytely Vaccinated will bring cards (N/A)    Patient location: GI    Anesthesia Type: MAC    Post pain: adequate analgesia    Post assessment: no apparent anesthetic complications    Post vital signs: stable    Level of consciousness: awake    Nausea/Vomiting: no nausea/vomiting    Complications: none    Transfer of care protocol was followed      Last vitals:   Visit Vitals  /68   Pulse 67   Temp 36.9 °C (98.4 °F) (Tympanic)   Resp 17   Ht 6' (1.829 m)   Wt 79.8 kg (176 lb)   SpO2 100%   BMI 23.87 kg/m²

## 2022-01-05 NOTE — DISCHARGE INSTRUCTIONS
Patient Education       Pólipos del colon   Conceptos Básicos   Redactado por los médicos y editores de UpToDate   ¿Qué son los pólipos del colon? -- Los pólipos del colon son nódulos pequeños que aparecen en el interior del intestino grueso (también llamado colon) (figura 1). Los pólipos son muy comunes: aproximadamente entre un tercio y la mitad de los adultos los tienen para cuando cumplen 50 años. En general no causan síntomas, lacey algunos pueden ser cancerosos o convertirse en cáncer, por lo que a veces los médicos deciden sacarlos.  ¿Cuáles son los síntomas de los pólipos del colon? -- En general, los pólipos del colon no producen síntomas.  ¿Cómo hacen los médicos para detectar los pólipos del colon? -- Los médicos generalmente encuentran pólipos en el colon cuando realizan pruebas de detección de cáncer de colon o de recto. Estas pruebas se realizan para encontrar cáncer de manera temprana, antes de que la persona presente síntomas. Entre estas pruebas de detección se incluyen:  · Colonoscopía - Antes de realizarle ivette colonoscopía le juan medicinas que lo ayudan a relajarse. Luego, un médico inserta un tubo ruby en el ano y lo desplaza hacia adentro del colon (figura 2). El tubo tiene ivette cámara integrada que le permite leenane al médico el interior del colon y además tiene herramientas en el extremo que el médico puede usar para sacar trozos de tejido, incluidos los pólipos. Ivette vez que se sacan los pólipos, generalmente se envían a un laboratorio para leeanne si son cáncer u otros problemas.  · Sigmoidoscopía - Ivette sigmoidoscopía es muy parecida a ivette colonoscopía, con la diferencia de que en esta prueba se examina solo la primera porción del colon, y en ivette colonoscopía se lo examina por completo.  · Colonografía de tomografía computarizada (también llamada colonoscopía virtual) - En ivette colonoscopía virtual, le jaylyn ivette radiografía especial llamada tomografía con la que se crean imágenes del  "colon.  · Prueba de heces - Heces es otra palabra para denominar a las evacuaciones. En las pruebas de heces se busca mariia o genes anormales en las muestras. Si ivette prueba de heces indica que es posible que haya un problema con el colon, los médicos suelen realizar ivette colonoscopía y luego encuentran pólipos, si es que los hay.  · Colonoscopía con cápsula - En raras ocasiones, el médico podría sugerir ivette colonoscopía con "cápsula". Para esta prueba, se traga ivette cápsula especial que contiene diminutas cámaras de video inalámbricas.   ¿Cómo se tratan los pólipos del colon? -- Los médicos sacan los pólipos con las mismas herramientas que usan para ivette colonoscopía. Pueden cortarlos con ivette herramienta cortante especial o atraparlos con un livingston (figura 3). La mayoría de los pólipos se puede sacar pratik ivette colonoscopía, lacey en algunos casos, los pólipos de gran tamaño deben sacarse más adelante, con otra colonoscopía o con cirugía.  ¿Qué sucede después de que me sacan los pólipos? -- Es posible que deba someterse a ivette colonoscopía cada cierto número de años para verificar que no haya más pólipos. En algunas personas, los pólipos vuelven a aparecer, y si tiene el tipo de pólipos que podrían convertirse en cáncer, el médico querrá sacarlos en cuanto aparezcan. Además, si los pólipos que se sacaron son de los que podrían volverse cancerosos, es posible que lopez familiares también deban someterse a pruebas de detección de pólipos y cáncer de colon antes de lo que hubieran tenido que hacerlo si usted no hubiese tenido los pólipos.  Según santiago situación, el médico podría sugerir que se realice pruebas genéticas, las cuales pueden mostrar si lopez pólipos están relacionados con un gen específico hereditario. Si esto es así, podrían recomendarle otras pruebas que pueden hacerse para prevenir el cáncer o detectarlo temprano.  ¿Se pueden prevenir los pólipos del colon? -- Para disminuir las posibilidades de tener pólipos o " "cáncer de colon opal lo siguiente:  · Consuma alimentos bajos en grasas y abundantes frutas, verduras y fibra  · Baje de peso, si tiene sobrepeso.  · No fume  · Limite la cantidad de alcohol que micky  Todos los artículos se actualizan a medida que se descubre nueva evidencia y culmina nuestro proceso de evaluación por homólogos   Norma artículo se recuperó de UpToDate el: Sep 21, 2021.  Artículo 94026 Versión 8.0.es-419.1  Release: 29.4.2 - C29.263  © 2021 [employer] Todos los derechos reservados.  figura 1: Sistema digestivo     En esta imagen se observan los órganos del cuerpo que procesan los alimentos. Juntos, estos órganos se llaman "sistema digestivo" o "tracto digestivo". A medida que los alimentos pasan por norma sistema, el cuerpo absorbe nutrientes y agua.  Gráfico 42174 Versión 4.0    figura 2: Colonoscopía     Rebekah ivette colonoscopía, usted se recuesta de costado y el médico le introduce un tubo ruby con ivette cámara en el ano (desde atrás). A continuación, el médico lleva el tubo hasta el recto y el colon. La cámara envía imágenes de video desde el interior del colon a ivette pantalla de televisión.  Gráfico 17828 Versión 6.0    figura 3: Extracción de un pólipo del colon     Jesus de los métodos que los médicos utilizan para extraer pólipos del colon es usar un [place]ston timur instrumento. Enroscan un cable alrededor del pólipo para apretarlo. Cuando el pólipo se desprende, el médico lo absorbe con el endoscopio para enviarlo al laboratorio y realizar pruebas.  Gráfico 32117 Versión 5.0    Exención de responsabilidad y uso de la información del consumidor   Esta información no es un consejo médico específico ni es un sustituto de la información que le rickie santiago proveedor de atención médica. Es solamente un resumen breve de datos generales. NO incluye toda la información sobre padecimientos, enfermedades, lesiones, pruebas, procedimientos, tratamientos, terapias, instrucciones de maeve u opciones de estilo de " kajal que podrían corresponder en santiago ryan. Debe hablar con santiago proveedor de atención médica para obtener información completa sobre santiago óscar y lopez opciones de tratamiento. Esta información no debe utilizarse para decidir si aceptar o no el consejo, las instrucciones o las recomendaciones de santiago proveedor de atención médica, y solo él posee los conocimientos y la capacitación para darle consejos adecuados para usted.El uso de riri sitio web se rige por los Términos de uso de UpToDate ©2021 UpToDate, Inc. Todos los derechos reservados.  Copyright   © 2021 UpToDate, Inc. Todos los derechos reservados.

## 2022-01-05 NOTE — PROVATION PATIENT INSTRUCTIONS
Discharge Summary/Instructions after an Endoscopic Procedure  Patient Name: Guillaume Salinas  Patient MRN: [id number]  Patient   YOB: 1955 Wednesday, January 5, 2022  Dereje Simon MD  Dear patient,  As a result of recent federal legislation (The Federal Cures Act), you may   receive lab or pathology results from your procedure in your [facility]   account before your physician is able to contact you. Your physician or   their representative will relay the results to you with their   recommendations at their soonest availability.  Thank you,  Your health is very important to us during the Covid Crisis. Following your   procedure today, you will receive a daily text for 2 weeks asking about   signs or symptoms of Covid 19.  Please respond to this text when you   receive it so we can follow up and keep you as safe as possible.   RESTRICTIONS:  During your procedure today, you received medications for sedation.  These   medications may affect your judgment, balance and coordination.  Therefore,   for 24 hours, you have the following restrictions:   - DO NOT drive a car, operate machinery, make legal/financial decisions,   sign important papers or drink alcohol.    ACTIVITY:  Today: no heavy lifting, straining or running due to procedural   sedation/anesthesia.  The following day: return to full activity including work.  DIET:  Eat and drink normally unless instructed otherwise.     TREATMENT FOR COMMON SIDE EFFECTS:  - Mild abdominal pain, nausea, belching, bloating or excessive gas:  rest,   eat lightly and use a heating pad.  - Sore Throat: treat with throat lozenges and/or gargle with warm salt   water.  - Because air was used during the procedure, expelling large amounts of air   from your rectum or belching is normal.  - If a bowel prep was taken, you may not have a bowel movement for 1-3 days.    This is normal.  SYMPTOMS TO WATCH FOR AND REPORT TO YOUR PHYSICIAN:  1. Abdominal pain  or bloating, other than gas cramps.  2. Chest pain.  3. Back pain.  4. Signs of infection such as: chills or fever occurring within 24 hours   after the procedure.  5. Rectal bleeding, which would show as bright red, maroon, or black stools.   (A tablespoon of blood from the rectum is not serious, especially if   hemorrhoids are present.)  6. Vomiting.  7. Weakness or dizziness.  GO DIRECTLY TO THE NEAREST EMERGENCY ROOM IF YOU HAVE ANY OF THE FOLLOWING:      Difficulty breathing              Chills and/or fever over 101 F   Persistent vomiting and/or vomiting blood   Severe abdominal pain   Severe chest pain   Black, tarry stools   Bleeding- more than one tablespoon   Any other symptom or condition that you feel may need urgent attention  Your doctor recommends these additional instructions:  If any biopsies were taken, your doctors clinic will contact you in 1 to 2   weeks with any results.  - Discharge patient to home.   - Resume previous diet.   - Continue present medications.   - Await pathology results.   - Repeat colonoscopy in 3 years for surveillance.   - Patient has a contact number available for emergencies.  The signs and   symptoms of potential delayed complications were discussed with the   patient.  Return to normal activities tomorrow.  Written discharge   instructions were provided to the patient.  For questions, problems or results please call your physician - Dereje Simon MD.  EMERGENCY PHONE NUMBER: [phone],  LAB RESULTS: [phone]  IF A COMPLICATION OR EMERGENCY SITUATION ARISES AND YOU ARE UNABLE TO REACH   YOUR PHYSICIAN - GO DIRECTLY TO THE EMERGENCY ROOM.  Dereje Simon MD  1/5/2022 11:03:06 AM  This report has been verified and signed electronically.  Dear patient,  As a result of recent federal legislation (The Federal Cures Act), you may   receive lab or pathology results from your procedure in your [facility]   account before your physician is able to  contact you. Your physician or   their representative will relay the results to you with their   recommendations at their soonest availability.  Thank you,  PROVATION

## 2022-01-05 NOTE — PLAN OF CARE
Patient Recovered to baseline, Discharge instructions reviewed with patient VSS. Federal Cures Act read and explained to patient .

## 2022-01-05 NOTE — ANESTHESIA POSTPROCEDURE EVALUATION
Anesthesia Post Evaluation    Patient: Guillaume Salinas    Procedure(s) Performed: Procedure(s) (LRB):  COLONOSCOPY Golytely Vaccinated will bring cards (N/A)    Final Anesthesia Type: MAC      Patient location during evaluation: GI PACU  Patient participation: Yes- Able to Participate  Level of consciousness: awake and alert and oriented  Post-procedure vital signs: reviewed and stable  Pain management: adequate  Airway patency: patent    PONV status at discharge: No PONV  Anesthetic complications: no      Cardiovascular status: blood pressure returned to baseline  Respiratory status: unassisted, room air and spontaneous ventilation  Hydration status: euvolemic  Follow-up not needed.          Vitals Value Taken Time   /56 01/05/22 1109   Temp 36.9 °C (98.4 °F) 01/05/22 1055   Pulse 68 01/05/22 1109   Resp 15 01/05/22 1109   SpO2 98 % 01/05/22 1109         No case tracking events are documented in the log.      Pain/Vaibhav Score: Vaibhav Score: 10 (1/5/2022 10:59 AM)

## 2022-01-05 NOTE — H&P
Short Stay Endoscopy History and Physical    PCP - Kal Shirley MD    Procedure - Colonoscopy  ASA - II  Mallampati - per anesthesia  History of Anesthesia problems - no  Family history Anesthesia problems - no     HPI:  This is a 66 y.o. male here for evaluation of : Colon cancer screening    Family history of colon cancer - no  History of polyps - na  Change in bowel habits - no  Rectal bleeding - no      ROS:  Constitutional: No fevers, chills, No weight loss  ENT: No allergies  CV: No chest pain  Pulm: No shortness of breath  GI: see HPI  Derm: No rash    Medical History:  has a past medical history of Hypertension.    Surgical History:  has no past surgical history on file.    Family History: family history includes Cancer in his brother and father; Hypertension in his mother.. Otherwise no colon cancer, inflammatory bowel disease, or GI malignancies.    Social History:  reports that he has quit smoking. His smoking use included cigarettes. He has never used smokeless tobacco. He reports previous alcohol use. He reports that he does not use drugs.    Review of patient's allergies indicates:  No Known Allergies    Medications:   Medications Prior to Admission   Medication Sig Dispense Refill Last Dose    aspirin (ECOTRIN) 81 MG EC tablet Take 81 mg by mouth once daily.       atorvastatin (LIPITOR) 40 MG tablet Take 1 tablet (40 mg total) by mouth once daily. 90 tablet 3     carvediloL (COREG) 6.25 MG tablet Take 1 tablet (6.25 mg total) by mouth 2 (two) times daily with meals. 60 tablet 6     gabapentin (NEURONTIN) 100 MG capsule Take 1 capsule (100 mg total) by mouth 3 (three) times daily. 90 capsule 11          Objective Findings:    Vital Signs: see nursing notes  Physical Exam:  General Appearance: Well appearing in no acute distress  Eyes:    No scleral icterus  ENT: Neck supple  Lungs: CTA anteriorly  Heart:  S1, S2 normal, no murmurs heard  Abdomen: Soft, non tender, non distended with positive  bowel sounds. No hepatosplenomegaly, ascites, or mass  Extremities: no edema  Skin: No rash      Labs:  Lab Results   Component Value Date    WBC 7.41 08/27/2021    WBC 7.41 08/27/2021    WBC 7.41 08/27/2021    HGB 17.0 08/27/2021    HGB 17.0 08/27/2021    HGB 17.0 08/27/2021    HCT 51.5 08/27/2021    HCT 51.5 08/27/2021    HCT 51.5 08/27/2021     08/27/2021     08/27/2021     08/27/2021    CHOL 229 (H) 10/19/2021    TRIG 198 (H) 10/19/2021    HDL 32 (L) 10/19/2021    ALT 20 08/27/2021    ALT 20 08/27/2021    ALT 20 08/27/2021    AST 16 08/27/2021    AST 16 08/27/2021    AST 16 08/27/2021     08/27/2021     08/27/2021     08/27/2021    K 4.4 08/27/2021    K 4.4 08/27/2021    K 4.4 08/27/2021     08/27/2021     08/27/2021     08/27/2021    CREATININE 1.3 08/27/2021    CREATININE 1.3 08/27/2021    CREATININE 1.3 08/27/2021    BUN 16 08/27/2021    BUN 16 08/27/2021    BUN 16 08/27/2021    CO2 27 08/27/2021    CO2 27 08/27/2021    CO2 27 08/27/2021    PSA 2.5 10/19/2021    HGBA1C 5.2 10/19/2021       I have explained the risks and benefits of endoscopy procedures to the patient including but not limited to bleeding, perforation, infection, and death.    Dereje Simon MD

## 2022-01-05 NOTE — ANESTHESIA PREPROCEDURE EVALUATION
01/05/2022  Guillaume Salinas is a 66 y.o., male. Here for colonoscopy under mac. Mets>4, npo. Denies medical comorbidities.     Past Medical History:   Diagnosis Date    Hypertension      No past surgical history on file.  Review of patient's allergies indicates:  No Known Allergies      Anesthesia Evaluation    I have reviewed the NPO Status.      Review of Systems  Anesthesia Hx:  No previous Anesthesia   EENT/Dental:EENT/Dental Normal   Cardiovascular:  Cardiovascular Normal Exercise tolerance: good     Pulmonary:  Pulmonary Normal    Renal/:  Renal/ Normal     Hepatic/GI:  Hepatic/GI Normal    Musculoskeletal:  Musculoskeletal Normal    Neurological:  Neurology Normal    Endocrine:  Endocrine Normal    Psych:  Psychiatric Normal           Physical Exam  General:  Well nourished    Airway/Jaw/Neck:  Airway Findings: Mouth Opening: Normal Tongue: Normal  General Airway Assessment: Adult  Mallampati: II  TM Distance: Normal, at least 6 cm      Dental:  Dental Findings: Periodontal disease, Severe              Anesthesia Plan  Type of Anesthesia, risks & benefits discussed:  Anesthesia Type:  MAC    Patient's Preference:   Plan Factors:          Intra-op Monitoring Plan:   Intra-op Monitoring Plan Comments:   Post Op Pain Control Plan: multimodal analgesia  Post Op Pain Control Plan Comments:     Induction:   IV  Beta Blocker:  Patient is not currently on a Beta-Blocker (No further documentation required).       Informed Consent: Patient understands risks and agrees with Anesthesia plan.  Questions answered. Anesthesia consent signed with patient.  ASA Score: 2     Day of Surgery Review of History & Physical:            Ready For Surgery From Anesthesia Perspective.   Consent obtained with .

## 2022-01-06 ENCOUNTER — TELEPHONE (OUTPATIENT)
Dept: ENDOSCOPY | Facility: HOSPITAL | Age: 67
End: 2022-01-06
Payer: MEDICARE

## 2022-01-07 LAB
FINAL PATHOLOGIC DIAGNOSIS: NORMAL
GROSS: NORMAL
Lab: NORMAL

## 2022-01-07 NOTE — PROGRESS NOTES
Pathology from recent colonoscopy reviewed.  Colon polyp(s) benign.  Repeat colonoscopy in 3 years.

## 2022-01-11 ENCOUNTER — CLINICAL SUPPORT (OUTPATIENT)
Dept: REHABILITATION | Facility: HOSPITAL | Age: 67
End: 2022-01-11
Payer: MEDICARE

## 2022-01-11 DIAGNOSIS — R53.1 WEAKNESS: ICD-10-CM

## 2022-01-11 DIAGNOSIS — Z74.09 STIFFNESS DUE TO IMMOBILITY: ICD-10-CM

## 2022-01-11 DIAGNOSIS — M25.60 STIFFNESS DUE TO IMMOBILITY: ICD-10-CM

## 2022-01-11 DIAGNOSIS — M79.601 PAIN OF RIGHT UPPER EXTREMITY: ICD-10-CM

## 2022-01-11 PROCEDURE — 97110 THERAPEUTIC EXERCISES: CPT | Mod: PN

## 2022-01-11 PROCEDURE — 97140 MANUAL THERAPY 1/> REGIONS: CPT | Mod: PN

## 2022-01-11 NOTE — PLAN OF CARE
Outpatient Therapy Updated Plan of Care     Visit Date: 1/11/2022  Name: Guillaume Salinas  Clinic Number: [id number]    Therapy Diagnosis:   Encounter Diagnoses   Name Primary?    Pain of right upper extremity     Weakness     Stiffness due to immobility      Physician: No ref. provider found    Physician Orders: OT evaluate and treat  Medical Diagnosis:   M25.519 (ICD-10-CM) - Shoulder pain, unspecified chronicity, unspecified laterality   M25.511 (ICD-10-CM) - Right shoulder pain, unspecified chronicity      Evaluation Date: 11/8/2021  Insurance Authorization period Expiration: 12/31/2021  Plan of Care Expiration Period: 3/11/2022  Next MD appointment: 5/23/2022     Visit # / Visits Authorized: 6 / 1 FOTO:37%  Time In:2:25  Time Out:3:10  Total Billable Time: 45 minutes     Precautions: Standard     Subjective     Update: pt continue to reports pain only when reaching back behind him    Objective     Update:   Range of Motion/Strength:   Shoulder   Right   Pain/Dysfunction with Movement     AROM PROM MMT     Flexion 130(=) 130(-10) 3+/5     Extension 55(=) WNL 4-/5     Abduction 85(-5) 120(=) 3+/5     HorizAdduction 45(=) WNL 3+/5     Internal rotation L4*(=) 50(-20) 3+/5     ER at 90° abd 80(-10) 65(+10) 3+/5     ER at 0° abd 50(-30) 80(=) 3+/5     NT=Not tested  Assessment     Update: Pt has been attending OT x 2 months(6 visits) he has been out of town and canceled a few. Despite that pt has been very pleasant and motivated. He demonstrates compliance with his HEP. Overall his ROM has sightly improved, however he is still painful and limited with certain motions. He has an overall improved FOTO score however it has remained the same for the last 2 intakes. Pt reports he only has pain with reaching behind his back. He has met 1/5 short term goals. At this point I think pt should reach out to his doctor for further testing. He seems to be reaching max potential for conservative rehab. We will continue  this month to assess for gains with consistency of sessions. He is still limited by ROM weakness and pain and would continue to benefit from skilled services.     Previous Short Term Goals Status:  Short Term Goals to be met in 4 weeks: (12/8/2021)  1) Initiate Hep -met, ongoing  2) Pt will increase Right shoulder AROM by 10 degrees grossly for improved performance with overhead ADL's -not met, progressing  3) Pt will report 4/10 pain in (Right)shoulder at worst -not met for extension  4) Pt will demonstrate increased MMT to 4-/5 grossly Right shoulder -Not met, progressing  5) Patient will be able to achieve less than or equal to 25% on FOTO shoulder survey demonstrating overall improved functional ability with upper extremity. -Not met, progressing     New Short Term Goals Status:   Long Term Goals to be met by discharge:  1) Independent with HEP -Not met, progressing  2) Pt will demonstrate (Right) shoulder AROM WNL grossly for [place] with ADL's -Not met, progressing  3) Pt will demonstrate (Right) shoulder MMT WNL grossly for [place] with functional activities -Not met, progressing  4) Independent and pain free with ADL's and IADL's -Not met, progressing  5) Patient will be able to achieve less than or equal to 15% on FOTO shoulder survey demonstrating overall improved functional ability with upper extremity. -Not met, progressing  Long Term Goal Status:   continue per initial plan of care.  Reasons for Recertification of Therapy:   Update POC    Plan     Updated Certification Period: 1/11/2022 to 3/11/2022  Recommended Treatment Plan: 2 times per week for 8 weeks: Manual Therapy, Moist Heat/ Ice, Self Care, Therapeutic Activities and Therapeutic Exercise      RYAN Corrigan  1/11/2022      I CERTIFY THE NEED FOR THESE SERVICES FURNISHED UNDER THIS PLAN OF TREATMENT AND WHILE UNDER MY CARE    Physician's comments:        Physician's Signature:  ___________________________________________________      Agree with PT/OT assessment and approve continuation of care. MD Maria M, MA

## 2022-01-11 NOTE — PROGRESS NOTES
Occupational Therapy Treatment Note     Date: 1/11/2022  Name: Guillaume Salinas  Clinic Number: [id number]    Therapy Diagnosis:   Encounter Diagnoses   Name Primary?    Pain of right upper extremity     Weakness     Stiffness due to immobility      Physician: No ref. provider found  Physician Orders: OT evaluate and treat  Medical Diagnosis:   M25.519 (ICD-10-CM) - Shoulder pain, unspecified chronicity, unspecified laterality   M25.511 (ICD-10-CM) - Right shoulder pain, unspecified chronicity      Evaluation Date: 11/8/2021  Insurance Authorization period Expiration: 12/31/2021  Plan of Care Expiration Period: 3/11/2022  Next MD appointment: 5/23/2022     Visit # / Visits Authorized: 6 / 1 FOTO:37%  Time In:2:25  Time Out:3:10  Total Billable Time: 45 minutes     Precautions: Standard     Subjective     Pt reports: pt continue to reports pain only when reaching back behind him  he was compliant with home exercise program given last session.   Response to previous treatment:pain free in session  Functional change: improved ER decreased flexion and abduction    Pain: 0/10  Location: right shoulder      Objective     Guillaume received the following manual therapy techniques for 10 minutes:   -consisting of patient supine for right shoulder lateral telescoping, upper trapezius soft tissue mobilization, pectoralis lift, subscapularis myofascial release and stretch, PROM with endrange stretching, glenohumeral joint inferior anterior posterior glides grade I-III, gentle shoulder oscillations    Guillaume received therapeutic exercises for 35 minutes including:  Exercises    UBE forward/reverse Level 2  3minutes each direction   PROM (Right) Shoulder Flexion/Abduction/Internal rotation/External Rotation 10x     Supine dowel Flexion 3  2/15   Sidelying Abduction  2  2/15   Sidelying External Rotation 2  2/15   pulleys 3'   Wall slides ball  2/15   Theraband Extension pull downs Green  2/15   Theraband Rows  "Green  2/15   Theraband Internal Rotation Green  2/15   Theraband External Rotation/horizontal abduction Green  2/15   Corner pectoralis stretch 3/30"   Internal rotation pulley stretch 3/30"     Home Exercises and Education Provided     Education provided:   - Progress towards goals     Written Home Exercises Provided: Patient instructed to cont prior HEP.  Exercises were reviewed and Guillaume was able to demonstrate them prior to the end of the session.  Guillaume demonstrated good  understanding of the HEP provided.   .   See EMR under Patient Instructions for exercises provided prior visit.      Assessment     Guillaume is progressing well towards his goals and there are no updates to goals at this time. Pt prognosis is Good.     Pt will continue to benefit from skilled outpatient occupational therapy to address the deficits listed in the problem list on initial evaluation provide pt/family education and to maximize pt's level of independence in the home and community environment.     Anticipated barriers to occupational therapy: none    Pt's spiritual, cultural and educational needs considered and pt agreeable to plan of care and goals.    Goals:  See update POC       Plan   Continue with OT POC   Updates/Grading for next session: progress as able      RYAN Corrigan         "

## 2022-01-12 NOTE — PROGRESS NOTES
"  Occupational Therapy Treatment Note     Date: 1/13/2022  Name: Guillaume Salinas  Clinic Number: [id number]    Therapy Diagnosis:   Encounter Diagnoses   Name Primary?    Pain of right upper extremity     Weakness     Stiffness due to immobility      Physician: No ref. provider found  Physician Orders: OT evaluate and treat  Medical Diagnosis:   M25.519 (ICD-10-CM) - Shoulder pain, unspecified chronicity, unspecified laterality   M25.511 (ICD-10-CM) - Right shoulder pain, unspecified chronicity      Evaluation Date: 11/8/2021  Insurance Authorization period Expiration: 12/31/2021  Plan of Care Expiration Period: 3/11/2022  Next MD appointment: 5/23/2022     Visit # / Visits Authorized: 7/ 1 FOTO:37%  Time In:10:35  Time Out:11:20  Total Billable Time: 45 minutes     Precautions: Standard     Subjective     Pt reports: "I feel good today."  he was compliant with home exercise program given last session.   Response to previous treatment:pain free in session  Functional change: none noted    Pain: 0/10  Location: right shoulder      Objective     Guillaume received the following manual therapy techniques for 10 minutes:   -consisting of patient supine for right shoulder lateral telescoping, upper trapezius soft tissue mobilization, pectoralis lift, subscapularis myofascial release and stretch, PROM with endrange stretching, glenohumeral joint inferior anterior posterior glides grade I-III, gentle shoulder oscillations    Guillaume received therapeutic exercises for 35 minutes including:  Exercises    UBE forward/reverse Level 2  3minutes each direction   PROM (Right) Shoulder Flexion/Abduction/Internal rotation/External Rotation 10x     Supine dowel Flexion 3  2/15   Sidelying Abduction  2  2/15   Sidelying External Rotation 2  2/15   pulleys 3'   Wall slides ball  2/15   Theraband Extension pull downs Green  2/15   Theraband Rows Green  2/15   Theraband Internal Rotation Green  2/15   Theraband External " "Rotation/horizontal abduction Green  2/15   Corner pectoralis stretch 3/30"   Internal rotation pulley stretch 3/30"     Home Exercises and Education Provided     Education provided:   - Progress towards goals     Written Home Exercises Provided: Patient instructed to cont prior HEP.  Exercises were reviewed and Guillaume was able to demonstrate them prior to the end of the session.  Guillaume demonstrated good  understanding of the HEP provided.   .   See EMR under Patient Instructions for exercises provided prior visit.      Assessment   Pt participated well this date. He remains pain free in sessions reporting pain only with certain movement. Pt with soft tissue restrictions noted in subscapularis and biceps however responds well to manual. He was able to complete all exercises in a pain free ROM and with good technique. Pt very motivated.     Guillaume is progressing well towards his goals and there are no updates to goals at this time. Pt prognosis is Good.     Pt will continue to benefit from skilled outpatient occupational therapy to address the deficits listed in the problem list on initial evaluation provide pt/family education and to maximize pt's level of independence in the home and community environment.     Anticipated barriers to occupational therapy: none    Pt's spiritual, cultural and educational needs considered and pt agreeable to plan of care and goals.    Goals:  See update POC       Plan   Continue with OT POC   Updates/Grading for next session: progress as able      RYAN Corrigan         "

## 2022-01-13 ENCOUNTER — CLINICAL SUPPORT (OUTPATIENT)
Dept: REHABILITATION | Facility: HOSPITAL | Age: 67
End: 2022-01-13
Payer: MEDICARE

## 2022-01-13 DIAGNOSIS — M25.60 STIFFNESS DUE TO IMMOBILITY: ICD-10-CM

## 2022-01-13 DIAGNOSIS — R53.1 WEAKNESS: ICD-10-CM

## 2022-01-13 DIAGNOSIS — M79.601 PAIN OF RIGHT UPPER EXTREMITY: ICD-10-CM

## 2022-01-13 DIAGNOSIS — Z74.09 STIFFNESS DUE TO IMMOBILITY: ICD-10-CM

## 2022-01-13 PROCEDURE — 97110 THERAPEUTIC EXERCISES: CPT | Mod: PN

## 2022-01-13 PROCEDURE — 97140 MANUAL THERAPY 1/> REGIONS: CPT | Mod: PN

## 2022-01-18 ENCOUNTER — CLINICAL SUPPORT (OUTPATIENT)
Dept: REHABILITATION | Facility: HOSPITAL | Age: 67
End: 2022-01-18
Payer: MEDICARE

## 2022-01-18 DIAGNOSIS — M25.60 STIFFNESS DUE TO IMMOBILITY: ICD-10-CM

## 2022-01-18 DIAGNOSIS — Z74.09 STIFFNESS DUE TO IMMOBILITY: ICD-10-CM

## 2022-01-18 DIAGNOSIS — M79.601 PAIN OF RIGHT UPPER EXTREMITY: ICD-10-CM

## 2022-01-18 DIAGNOSIS — R53.1 WEAKNESS: ICD-10-CM

## 2022-01-18 PROCEDURE — 97140 MANUAL THERAPY 1/> REGIONS: CPT | Mod: PN

## 2022-01-18 PROCEDURE — 97110 THERAPEUTIC EXERCISES: CPT | Mod: PN

## 2022-01-18 NOTE — PROGRESS NOTES
Occupational Therapy Treatment Note     Date: 1/18/2022  Name: Guillaume Salinas  Clinic Number: [id number]    Therapy Diagnosis:   Encounter Diagnoses   Name Primary?    Pain of right upper extremity     Weakness     Stiffness due to immobility      Physician: No ref. provider found  Physician Orders: OT evaluate and treat  Medical Diagnosis:   M25.519 (ICD-10-CM) - Shoulder pain, unspecified chronicity, unspecified laterality   M25.511 (ICD-10-CM) - Right shoulder pain, unspecified chronicity      Evaluation Date: 11/8/2021  Insurance Authorization period Expiration: 12/31/2021  Plan of Care Expiration Period: 3/11/2022  Next MD appointment: 5/23/2022     Visit # / Visits Authorized: 8/ 1 FOTO:37%  Time In:7:45  Time Out:8:30  Total Billable Time: 25 minutes     Precautions: Standard     Subjective     Pt reports: no c/o today  he was compliant with home exercise program given last session.   Response to previous treatment:pain free in session  Functional change: none noted    Pain: 0/10  Location: right shoulder      Objective     Guillaume received the following manual therapy techniques for 10 minutes:   -consisting of patient supine for right shoulder lateral telescoping, upper trapezius soft tissue mobilization, pectoralis lift, subscapularis myofascial release and stretch, PROM with endrange stretching, glenohumeral joint inferior anterior posterior glides grade I-III, gentle shoulder oscillations    Guillaume received therapeutic exercises for 15 minutes including:  Exercises    UBE forward/reverse Level 2  3minutes each direction   PROM (Right) Shoulder Flexion/Abduction/Internal rotation/External Rotation 10x     Supine dowel Flexion 3  2/15   Sidelying Abduction  2  2/15   Sidelying External Rotation 2  2/15   pulleys 3'   Wall slides ball  2/15   Theraband Extension pull downs Green  2/15   Theraband Rows Green  2/15   Theraband Internal Rotation Green  2/15   Theraband External Rotation/horizontal  "abduction Green  2/15   Corner pectoralis stretch 3/30"   Internal rotation pulley stretch 3/30"     Home Exercises and Education Provided     Education provided:   - Progress towards goals     Written Home Exercises Provided: Patient instructed to cont prior HEP.  Exercises were reviewed and Guillaume was able to demonstrate them prior to the end of the session.  Guillaume demonstrated good  understanding of the HEP provided.   .   See EMR under Patient Instructions for exercises provided prior visit.      Assessment   Pt participated well this date. He remains pain free in sessions reporting pain only with certain movement. Pt with soft tissue restrictions noted in subscapularis and biceps however responds well to manual. He was able to complete all exercises in a pain free ROM and with good technique. Pt very motivated. Next session to focus on continued strengthening and ROM.    Guillaume is progressing well towards his goals and there are no updates to goals at this time. Pt prognosis is Good.     Pt will continue to benefit from skilled outpatient occupational therapy to address the deficits listed in the problem list on initial evaluation provide pt/family education and to maximize pt's level of independence in the home and community environment.     Anticipated barriers to occupational therapy: none    Pt's spiritual, cultural and educational needs considered and pt agreeable to plan of care and goals.    Goals:  New Short Term Goals Status:   Long Term Goals to be met by discharge:  1) Independent with HEP -Not met, progressing  2) Pt will demonstrate (Right) shoulder AROM WNL grossly for [place] with ADL's -Not met, progressing  3) Pt will demonstrate (Right) shoulder MMT WNL grossly for [place] with functional activities -Not met, progressing  4) Independent and pain free with ADL's and IADL's -Not met, progressing  5) Patient will be able to achieve less than or equal to 15% on FOTO shoulder survey " demonstrating overall improved functional ability with upper extremity. -Not met, progressing       Plan   Continue with OT POC   Updates/Grading for next session: progress as able      RYAN Corrigan

## 2022-01-19 ENCOUNTER — OFFICE VISIT (OUTPATIENT)
Dept: OPTOMETRY | Facility: CLINIC | Age: 67
End: 2022-01-19
Payer: MEDICARE

## 2022-01-19 DIAGNOSIS — H53.9 VISUAL DISTURBANCE: Primary | ICD-10-CM

## 2022-01-19 DIAGNOSIS — H25.13 NUCLEAR SCLEROSIS OF BOTH EYES: ICD-10-CM

## 2022-01-19 DIAGNOSIS — H11.001 PTERYGIUM EYE, RIGHT: ICD-10-CM

## 2022-01-19 PROCEDURE — 92004 PR EYE EXAM, NEW PATIENT,COMPREHESV: ICD-10-PCS | Mod: S$GLB,,, | Performed by: OPTOMETRIST

## 2022-01-19 PROCEDURE — 92004 COMPRE OPH EXAM NEW PT 1/>: CPT | Mod: S$GLB,,, | Performed by: OPTOMETRIST

## 2022-01-19 PROCEDURE — 1126F PR PAIN SEVERITY QUANTIFIED, NO PAIN PRESENT: ICD-10-PCS | Mod: CPTII,S$GLB,, | Performed by: OPTOMETRIST

## 2022-01-19 PROCEDURE — 99999 PR PBB SHADOW E&M-EST. PATIENT-LVL II: ICD-10-PCS | Mod: PBBFAC,,, | Performed by: OPTOMETRIST

## 2022-01-19 PROCEDURE — 1159F MED LIST DOCD IN RCRD: CPT | Mod: CPTII,S$GLB,, | Performed by: OPTOMETRIST

## 2022-01-19 PROCEDURE — 3288F FALL RISK ASSESSMENT DOCD: CPT | Mod: CPTII,S$GLB,, | Performed by: OPTOMETRIST

## 2022-01-19 PROCEDURE — 1159F PR MEDICATION LIST DOCUMENTED IN MEDICAL RECORD: ICD-10-PCS | Mod: CPTII,S$GLB,, | Performed by: OPTOMETRIST

## 2022-01-19 PROCEDURE — 1126F AMNT PAIN NOTED NONE PRSNT: CPT | Mod: CPTII,S$GLB,, | Performed by: OPTOMETRIST

## 2022-01-19 PROCEDURE — 3288F PR FALLS RISK ASSESSMENT DOCUMENTED: ICD-10-PCS | Mod: CPTII,S$GLB,, | Performed by: OPTOMETRIST

## 2022-01-19 PROCEDURE — 1101F PT FALLS ASSESS-DOCD LE1/YR: CPT | Mod: CPTII,S$GLB,, | Performed by: OPTOMETRIST

## 2022-01-19 PROCEDURE — 99999 PR PBB SHADOW E&M-EST. PATIENT-LVL II: CPT | Mod: PBBFAC,,, | Performed by: OPTOMETRIST

## 2022-01-19 PROCEDURE — 1101F PR PT FALLS ASSESS DOC 0-1 FALLS W/OUT INJ PAST YR: ICD-10-PCS | Mod: CPTII,S$GLB,, | Performed by: OPTOMETRIST

## 2022-01-19 NOTE — PROGRESS NOTES
"HPI     CC: Pt is here today for ocular concerns due to new onset flashes of   light. He has had 3-4 episodes since December and it occurs in both eyes.   He denies seeing floaters or having a headache around the time of the   flashes. He describes the flashes as little stars when he closes his eyes.   The "stars" lasts anywhere from 5-10 minutes.    COLT: 1 year    (-) Changes in vision   (-) Pain  (-) Irritation   (-) Itching   (+) Flashes  (-) Floaters  (+) Glasses wearer  (-) CL wearer  (-) Uses eye gtts    Does patient want a refraction today? no    (-) Eye injury  (+) Eye surgery, Pterygium Removal  (-)POHx  (-)FOHx    (-)DM                      Last edited by Cynthia Bravo, OD on 1/19/2022  3:15 PM. (History)            Assessment /Plan     For exam results, see Encounter Report.    Visual disturbance  -     Ambulatory referral/consult to Ophthalmology    Nuclear sclerosis of both eyes    Pterygium eye, right      1. Educated pt on findings. No holes, tears, detachments noted 360 OU. (-)senior sign OU. May be impending PVD. Educated on s/s of RD and to RTC ASAP if occur. Monitor 1-2 months unless changes noted sooner.     2. Educated pt on findings. Not visually significant. No need for removal at this time. Monitor yearly.     3. Educated pt on findings. Recommend UV protection when outside and ATs prn. Not impacting visual axis. Monitor yearly.       RTC in 1-2 months for retina f/u or sooner if changes noted.                  "

## 2022-01-20 ENCOUNTER — CLINICAL SUPPORT (OUTPATIENT)
Dept: REHABILITATION | Facility: HOSPITAL | Age: 67
End: 2022-01-20
Payer: MEDICARE

## 2022-01-20 DIAGNOSIS — M79.601 PAIN OF RIGHT UPPER EXTREMITY: ICD-10-CM

## 2022-01-20 DIAGNOSIS — M25.60 STIFFNESS DUE TO IMMOBILITY: ICD-10-CM

## 2022-01-20 DIAGNOSIS — Z74.09 STIFFNESS DUE TO IMMOBILITY: ICD-10-CM

## 2022-01-20 DIAGNOSIS — R53.1 WEAKNESS: ICD-10-CM

## 2022-01-20 PROCEDURE — 97110 THERAPEUTIC EXERCISES: CPT | Mod: PN

## 2022-01-20 PROCEDURE — 97140 MANUAL THERAPY 1/> REGIONS: CPT | Mod: PN

## 2022-01-20 NOTE — PROGRESS NOTES
Occupational Therapy Treatment Note     Date: 1/20/2022  Name: Guillaume Salinas  Clinic Number: [id number]    Therapy Diagnosis:   Encounter Diagnoses   Name Primary?    Pain of right upper extremity     Weakness     Stiffness due to immobility      Physician: Harvinder Martínez PA*  Physician Orders: OT evaluate and treat  Medical Diagnosis:   M25.519 (ICD-10-CM) - Shoulder pain, unspecified chronicity, unspecified laterality   M25.511 (ICD-10-CM) - Right shoulder pain, unspecified chronicity      Evaluation Date: 11/8/2021  Insurance Authorization period Expiration: 12/31/2021  Plan of Care Expiration Period: 3/11/2022  Next MD appointment: 5/23/2022     Visit # / Visits Authorized: 9/ 1 FOTO:37%  Time In:10:45  Time Out:11:30   Total Billable Time: 45 minutes     Precautions: Standard     Subjective     Pt reports: no c/o today  he was compliant with home exercise program given last session.   Response to previous treatment:pain free in session  Functional change: none noted    Pain: 0/10  Location: right shoulder      Objective     Guillaume received the following manual therapy techniques for 10 minutes:   -consisting of patient supine for right shoulder lateral telescoping, upper trapezius soft tissue mobilization, pectoralis lift, subscapularis myofascial release and stretch, PROM with endrange stretching, glenohumeral joint inferior anterior posterior glides grade I-III, gentle shoulder oscillations    Guillaume received therapeutic exercises for 35 minutes including:  Exercises    UBE forward/reverse Level 4  3minutes each direction   PROM (Right) Shoulder Flexion/Abduction/Internal rotation/External Rotation 10x     Supine dowel Flexion 3  2/15   Sidelying Abduction  2  2/15   Sidelying External Rotation 2  2/15   pulleys 3'   Wall slides ball  2/15   Theraband Extension pull downs Green  2/15   Theraband Rows Green  2/15   Theraband Internal Rotation Green  2/15   Theraband External  "Rotation/horizontal abduction Green  2/15   Corner pectoralis stretch 3/30"   Internal rotation pulley stretch 3/30"       Range of Motion/Strength:   Shoulder   Right   Pain/Dysfunction with Movement     AROM PROM MMT     Flexion 130(=) 130(-10) 3+/5     Extension 55(=) WNL 4-/5     Abduction 85(-5) 120(=) 3+/5     HorizAdduction 45(=) WNL 3+/5     Internal rotation L4*(=) 50(-20) 3+/5     ER at 90° abd 80(-10) 65(+10) 3+/5     ER at 0° abd 50(-30) 80(=) 3+/5     NT=Not tested      Home Exercises and Education Provided     Education provided:   - Progress towards goals     Written Home Exercises Provided: Patient instructed to cont prior HEP.  Exercises were reviewed and Guillaume was able to demonstrate them prior to the end of the session.  Guillaume demonstrated good  understanding of the HEP provided.   .   See EMR under Patient Instructions for exercises provided prior visit.      Assessment   Pt participated well this date. He remains pain free in sessions reporting pain only with certain movement and at end ranges. Pt with soft tissue restrictions noted in subscapularis, biceps and GHJ however responds well to manual. He was able to complete all exercises in a pain free ROM and with good technique. Pt very motivated. Next session to focus on continued strengthening and ROM.    Guillaume is progressing well towards his goals and there are no updates to goals at this time. Pt prognosis is Good.     Pt will continue to benefit from skilled outpatient occupational therapy to address the deficits listed in the problem list on initial evaluation provide pt/family education and to maximize pt's level of independence in the home and community environment.     Anticipated barriers to occupational therapy: none    Pt's spiritual, cultural and educational needs considered and pt agreeable to plan of care and goals.    Goals:  New Short Term Goals Status:   Long Term Goals to be met by discharge:  1) Independent with HEP -Not " met, progressing  2) Pt will demonstrate (Right) shoulder AROM WNL grossly for [place] with ADL's -Not met, progressing  3) Pt will demonstrate (Right) shoulder MMT WNL grossly for [place] with functional activities -Not met, progressing  4) Independent and pain free with ADL's and IADL's -Not met, progressing  5) Patient will be able to achieve less than or equal to 15% on FOTO shoulder survey demonstrating overall improved functional ability with upper extremity. -Not met, progressing    Plan   Continue with OT POC   Updates/Grading for next session: progress as able      RYAN Corrigan

## 2022-01-24 ENCOUNTER — PES CALL (OUTPATIENT)
Dept: ADMINISTRATIVE | Facility: CLINIC | Age: 67
End: 2022-01-24
Payer: MEDICARE

## 2022-01-25 ENCOUNTER — CLINICAL SUPPORT (OUTPATIENT)
Dept: REHABILITATION | Facility: HOSPITAL | Age: 67
End: 2022-01-25
Payer: MEDICARE

## 2022-01-25 DIAGNOSIS — R53.1 WEAKNESS: ICD-10-CM

## 2022-01-25 DIAGNOSIS — M79.601 PAIN OF RIGHT UPPER EXTREMITY: ICD-10-CM

## 2022-01-25 DIAGNOSIS — Z74.09 STIFFNESS DUE TO IMMOBILITY: ICD-10-CM

## 2022-01-25 DIAGNOSIS — M25.60 STIFFNESS DUE TO IMMOBILITY: ICD-10-CM

## 2022-01-25 PROCEDURE — 97140 MANUAL THERAPY 1/> REGIONS: CPT | Mod: PN

## 2022-01-25 PROCEDURE — 97110 THERAPEUTIC EXERCISES: CPT | Mod: PN

## 2022-01-25 NOTE — PROGRESS NOTES
"  Occupational Therapy Treatment Note     Date: 1/25/2022  Name: Guillaume Salinas  Clinic Number: [id number]    Therapy Diagnosis:   Encounter Diagnoses   Name Primary?    Pain of right upper extremity     Weakness     Stiffness due to immobility      Physician: Harvinder Martínez PA*  Physician Orders: OT evaluate and treat  Medical Diagnosis:   M25.519 (ICD-10-CM) - Shoulder pain, unspecified chronicity, unspecified laterality   M25.511 (ICD-10-CM) - Right shoulder pain, unspecified chronicity      Evaluation Date: 11/8/2021  Insurance Authorization period Expiration: 12/31/2021  Plan of Care Expiration Period: 3/11/2022  Next MD appointment: 5/23/2022     Visit # / Visits Authorized: 10/ 1 FOTO:37%  Time In:7:45  Time Out:8:30   Total Billable Time: 45 minutes     Precautions: Standard     Subjective     Pt reports: "I can finish up on Thursday."  he was compliant with home exercise program given last session.   Response to previous treatment:pain free in session  Functional change: none noted    Pain: 0/10  Location: right shoulder      Objective     Guillaume received the following manual therapy techniques for 10 minutes:   -consisting of patient supine for right shoulder lateral telescoping, upper trapezius soft tissue mobilization, pectoralis lift, subscapularis myofascial release and stretch, PROM with endrange stretching, glenohumeral joint inferior anterior posterior glides grade I-III, gentle shoulder oscillations    Guillaume received therapeutic exercises for 35 minutes including:  Exercises    UBE forward/reverse Level 4  3minutes each direction   PROM (Right) Shoulder Flexion/Abduction/Internal rotation/External Rotation 10x     Supine dowel Flexion 3  2/15   Sidelying Abduction  2  2/15   Sidelying External Rotation 2  2/15   pulleys 3'   Wall slides ball  2/15   Theraband Extension pull downs Green  2/15   Theraband Rows Green  2/15   Theraband Internal Rotation Green  2/15   Theraband " "External Rotation/horizontal abduction Green  2/15   Corner pectoralis stretch 3/30"   Internal rotation pulley stretch 3/30"       Range of Motion/Strength:   Shoulder   Right   Pain/Dysfunction with Movement     AROM PROM MMT     Flexion 130(=) 130(-10) 3+/5     Extension 55(=) WNL 4-/5     Abduction 85(-5) 120(=) 3+/5     HorizAdduction 45(=) WNL 3+/5     Internal rotation L4*(=) 50(-20) 3+/5     ER at 90° abd 80(-10) 65(+10) 3+/5     ER at 0° abd 50(-30) 80(=) 3+/5     NT=Not tested      Home Exercises and Education Provided     Education provided:   - Progress towards goals     Written Home Exercises Provided: Patient instructed to cont prior HEP.  Exercises were reviewed and Guillaume was able to demonstrate them prior to the end of the session.  Guillaume demonstrated good  understanding of the HEP provided.     See EMR under Patient Instructions for exercises provided prior visit.      Assessment   Pt participated well this date. He remains pain free in sessions reporting pain only with certain movement and at end ranges. Pt with soft tissue restrictions noted in subscapularis, biceps and GHJ however responds well to manual. He was able to complete all exercises in a pain free ROM and with good technique. Pt very motivated. Next session plan to reassess and discharge to [facility].    Guillaume is progressing well towards his goals and there are no updates to goals at this time. Pt prognosis is Good.     Pt will continue to benefit from skilled outpatient occupational therapy to address the deficits listed in the problem list on initial evaluation provide pt/family education and to maximize pt's level of independence in the home and community environment.     Anticipated barriers to occupational therapy: none    Pt's spiritual, cultural and educational needs considered and pt agreeable to plan of care and goals.    Goals:  New Short Term Goals Status:   Long Term Goals to be met by discharge:  1) Independent with HEP -Not " met, progressing  2) Pt will demonstrate (Right) shoulder AROM WNL grossly for [place] with ADL's -Not met, progressing  3) Pt will demonstrate (Right) shoulder MMT WNL grossly for [place] with functional activities -Not met, progressing  4) Independent and pain free with ADL's and IADL's -Not met, progressing  5) Patient will be able to achieve less than or equal to 15% on FOTO shoulder survey demonstrating overall improved functional ability with upper extremity. -Not met, progressing    Plan   Continue with OT POC   Updates/Grading for next session: Discharge next session.       RYAN Corrigan

## 2022-01-27 ENCOUNTER — CLINICAL SUPPORT (OUTPATIENT)
Dept: REHABILITATION | Facility: HOSPITAL | Age: 67
End: 2022-01-27
Payer: MEDICARE

## 2022-01-27 DIAGNOSIS — M25.60 STIFFNESS DUE TO IMMOBILITY: ICD-10-CM

## 2022-01-27 DIAGNOSIS — M79.601 PAIN OF RIGHT UPPER EXTREMITY: ICD-10-CM

## 2022-01-27 DIAGNOSIS — R53.1 WEAKNESS: ICD-10-CM

## 2022-01-27 DIAGNOSIS — Z74.09 STIFFNESS DUE TO IMMOBILITY: ICD-10-CM

## 2022-01-27 PROCEDURE — 97110 THERAPEUTIC EXERCISES: CPT | Mod: PN

## 2022-01-27 PROCEDURE — 97140 MANUAL THERAPY 1/> REGIONS: CPT | Mod: PN

## 2022-01-27 NOTE — PROGRESS NOTES
"  Occupational Therapy Treatment Note     Date: 1/27/2022  Name: Guillaume Salinas  Clinic Number: [id number]    Therapy Diagnosis:   Encounter Diagnoses   Name Primary?    Pain of right upper extremity     Weakness     Stiffness due to immobility      Physician: Harvinder Martínez PA*  Physician Orders: OT evaluate and treat  Medical Diagnosis:   M25.519 (ICD-10-CM) - Shoulder pain, unspecified chronicity, unspecified laterality   M25.511 (ICD-10-CM) - Right shoulder pain, unspecified chronicity      Evaluation Date: 11/8/2021  Insurance Authorization period Expiration: 12/31/2021  Plan of Care Expiration Period: 3/11/2022  Next MD appointment: 5/23/2022     Visit # / Visits Authorized: 11/ 1 FOTO:25%  Time In:10:45  Time Out:11:30   Total Billable Time: 45 minutes     Precautions: Standard     Subjective     Pt reports: "I will follow up with the doctor and see if he wants to get me a shot."  he was compliant with home exercise program given last session.   Response to previous treatment:pain free in session  Functional change: none noted    Pain: 0/10  Location: right shoulder      Objective     Guillaume received the following manual therapy techniques for 10 minutes:   -consisting of patient supine for right shoulder lateral telescoping, upper trapezius soft tissue mobilization, pectoralis lift, subscapularis myofascial release and stretch, PROM with endrange stretching, glenohumeral joint inferior anterior posterior glides grade I-III, gentle shoulder oscillations    Guillaume received therapeutic exercises for 35 minutes including:  Exercises    UBE forward/reverse Level 4  3minutes each direction   PROM (Right) Shoulder Flexion/Abduction/Internal rotation/External Rotation 10x     Supine dowel Flexion 3  2/15   Sidelying Abduction  2  2/15   Sidelying External Rotation 2  2/15   pulleys 3'   Wall slides ball  2/15   Theraband Extension pull downs Green  2/15   Theraband Rows Green  2/15   Theraband " "Internal Rotation Green  2/15   Theraband External Rotation/horizontal abduction Green  2/15   Corner pectoralis stretch 3/30"   Internal rotation pulley stretch 3/30"     Range of Motion/Strength:   Shoulder   Right   Pain/Dysfunction with Movement     AROM PROM MMT     Flexion 120(+5) 142(+12) 3+/5     Extension 50(-5) WNL 4-/5     Abduction 85(+7) 102(-8) 3+/5     HorizAdduction 45(=) WNL 3+/5     Internal rotation T12(+L4) 75(+25) 3+/5     ER at 90° abd 87(+17) 47(-8) 3+/5     ER at 0° abd 65(-15) 75(+10) 3+/5     NT=Not tested    Home Exercises and Education Provided     Education provided:   - Progress towards goals     Written Home Exercises Provided: Patient instructed to cont prior HEP.  Exercises were reviewed and Guillaume was able to demonstrate them prior to the end of the session.  Guillaume demonstrated good  understanding of the HEP provided.     See EMR under Patient Instructions for exercises provided prior visit.      Assessment   Pt has been attending OT x 10 weeks for treatment Right shoulder pain, weakness and stiffness. He has been very pleasant and motivated throughout the course of care. Pt with some improvement in ROM since initial evaluation however not a significant amount. He has an improved FOTO score since initial evaluation indicating some improvement in self care tasks. He reports he is Independent with HEP. Pt continues to have capsular and soft tissue restrictions consistent with signs and symptoms of adhesive capsulitis. Pt seems to be reaching max potential for conservative measures in rehab at this time.  He was not appropriate for a Cortisone shot at last ortho consult. He may benefit from one at this time if appropriate. Instructed pt to follow up with ortho at this time and plan to progress as indicated by MD.     Goals:  New Short Term Goals Status:   Long Term Goals to be met by discharge:  1) Independent with HEP -met  2) Pt will demonstrate (Right) shoulder AROM WNL grossly for " Hettinger with ADL's -Not met, progressing  3) Pt will demonstrate (Right) shoulder MMT WNL grossly for Hettinger with functional activities -Not met, progressing  4) Independent and pain free with ADL's and IADL's -Not met, progressing  5) Patient will be able to achieve less than or equal to 15% on FOTO shoulder survey demonstrating overall improved functional ability with upper extremity. -Not met, progressing    Plan   Pt instructed to follow up with Ortho at this time. Plan to continue as indicated by Ortho.     RYAN Corrigan

## 2022-02-14 ENCOUNTER — PATIENT OUTREACH (OUTPATIENT)
Dept: ADMINISTRATIVE | Facility: OTHER | Age: 67
End: 2022-02-14
Payer: MEDICARE

## 2022-02-15 ENCOUNTER — OFFICE VISIT (OUTPATIENT)
Dept: CARDIOLOGY | Facility: CLINIC | Age: 67
End: 2022-02-15
Payer: MEDICARE

## 2022-02-15 VITALS
DIASTOLIC BLOOD PRESSURE: 72 MMHG | SYSTOLIC BLOOD PRESSURE: 131 MMHG | RESPIRATION RATE: 20 BRPM | HEART RATE: 70 BPM | HEIGHT: 72 IN | BODY MASS INDEX: 23.7 KG/M2 | WEIGHT: 175 LBS

## 2022-02-15 DIAGNOSIS — I77.1 ILIAC ARTERY STENOSIS, RIGHT: ICD-10-CM

## 2022-02-15 DIAGNOSIS — I10 PRIMARY HYPERTENSION: ICD-10-CM

## 2022-02-15 DIAGNOSIS — E78.5 HYPERLIPIDEMIA, UNSPECIFIED HYPERLIPIDEMIA TYPE: ICD-10-CM

## 2022-02-15 DIAGNOSIS — I25.10 CORONARY ARTERY DISEASE INVOLVING NATIVE CORONARY ARTERY OF NATIVE HEART WITHOUT ANGINA PECTORIS: ICD-10-CM

## 2022-02-15 DIAGNOSIS — I25.10 CORONARY ARTERY CALCIFICATION SEEN ON CAT SCAN: ICD-10-CM

## 2022-02-15 PROCEDURE — 99214 PR OFFICE/OUTPT VISIT, EST, LEVL IV, 30-39 MIN: ICD-10-PCS | Mod: S$GLB,,, | Performed by: INTERNAL MEDICINE

## 2022-02-15 PROCEDURE — 99499 RISK ADDL DX/OHS AUDIT: ICD-10-PCS | Mod: S$GLB,,, | Performed by: INTERNAL MEDICINE

## 2022-02-15 PROCEDURE — 1159F MED LIST DOCD IN RCRD: CPT | Mod: CPTII,S$GLB,, | Performed by: INTERNAL MEDICINE

## 2022-02-15 PROCEDURE — 3078F DIAST BP <80 MM HG: CPT | Mod: CPTII,S$GLB,, | Performed by: INTERNAL MEDICINE

## 2022-02-15 PROCEDURE — 1160F RVW MEDS BY RX/DR IN RCRD: CPT | Mod: CPTII,S$GLB,, | Performed by: INTERNAL MEDICINE

## 2022-02-15 PROCEDURE — 99999 PR PBB SHADOW E&M-EST. PATIENT-LVL III: ICD-10-PCS | Mod: PBBFAC,,, | Performed by: INTERNAL MEDICINE

## 2022-02-15 PROCEDURE — 3078F PR MOST RECENT DIASTOLIC BLOOD PRESSURE < 80 MM HG: ICD-10-PCS | Mod: CPTII,S$GLB,, | Performed by: INTERNAL MEDICINE

## 2022-02-15 PROCEDURE — 1101F PT FALLS ASSESS-DOCD LE1/YR: CPT | Mod: CPTII,S$GLB,, | Performed by: INTERNAL MEDICINE

## 2022-02-15 PROCEDURE — 99499 UNLISTED E&M SERVICE: CPT | Mod: S$GLB,,, | Performed by: INTERNAL MEDICINE

## 2022-02-15 PROCEDURE — 1101F PR PT FALLS ASSESS DOC 0-1 FALLS W/OUT INJ PAST YR: ICD-10-PCS | Mod: CPTII,S$GLB,, | Performed by: INTERNAL MEDICINE

## 2022-02-15 PROCEDURE — 3075F SYST BP GE 130 - 139MM HG: CPT | Mod: CPTII,S$GLB,, | Performed by: INTERNAL MEDICINE

## 2022-02-15 PROCEDURE — 99214 OFFICE O/P EST MOD 30 MIN: CPT | Mod: S$GLB,,, | Performed by: INTERNAL MEDICINE

## 2022-02-15 PROCEDURE — 3075F PR MOST RECENT SYSTOLIC BLOOD PRESS GE 130-139MM HG: ICD-10-PCS | Mod: CPTII,S$GLB,, | Performed by: INTERNAL MEDICINE

## 2022-02-15 PROCEDURE — 3288F FALL RISK ASSESSMENT DOCD: CPT | Mod: CPTII,S$GLB,, | Performed by: INTERNAL MEDICINE

## 2022-02-15 PROCEDURE — 99999 PR PBB SHADOW E&M-EST. PATIENT-LVL III: CPT | Mod: PBBFAC,,, | Performed by: INTERNAL MEDICINE

## 2022-02-15 PROCEDURE — 3008F PR BODY MASS INDEX (BMI) DOCUMENTED: ICD-10-PCS | Mod: CPTII,S$GLB,, | Performed by: INTERNAL MEDICINE

## 2022-02-15 PROCEDURE — 3008F BODY MASS INDEX DOCD: CPT | Mod: CPTII,S$GLB,, | Performed by: INTERNAL MEDICINE

## 2022-02-15 PROCEDURE — 1159F PR MEDICATION LIST DOCUMENTED IN MEDICAL RECORD: ICD-10-PCS | Mod: CPTII,S$GLB,, | Performed by: INTERNAL MEDICINE

## 2022-02-15 PROCEDURE — 1160F PR REVIEW ALL MEDS BY PRESCRIBER/CLIN PHARMACIST DOCUMENTED: ICD-10-PCS | Mod: CPTII,S$GLB,, | Performed by: INTERNAL MEDICINE

## 2022-02-15 PROCEDURE — 3288F PR FALLS RISK ASSESSMENT DOCUMENTED: ICD-10-PCS | Mod: CPTII,S$GLB,, | Performed by: INTERNAL MEDICINE

## 2022-02-15 RX ORDER — ATORVASTATIN CALCIUM 80 MG/1
80 TABLET, FILM COATED ORAL DAILY
Qty: 90 TABLET | Refills: 3 | Status: SHIPPED | OUTPATIENT
Start: 2022-02-15

## 2022-02-15 RX ORDER — CARVEDILOL 6.25 MG/1
6.25 TABLET ORAL 2 TIMES DAILY WITH MEALS
Qty: 180 TABLET | Refills: 3 | Status: SHIPPED | OUTPATIENT
Start: 2022-02-15 | End: 2023-02-13

## 2022-02-15 NOTE — PROGRESS NOTES
Chief Complaint   Patient presents with    Coronary artery disease involving native coronary artery of       HPI: This is a 67 yo M with a recently noted h/o hyperlipidemia presenting for follow-up.    The patient was evaluated by his PCP and noted some intermittent chest discomfort. He underwent a TANNER that revealed an anterior wall motion abnormality with stress in the setting of marked hypertension.  This resulted in a coronary CTA that revealed no evidence of significant coronary artery disease.    Patient has done well since and denies significant chest pain, shortness of breath, or dyspnea on exertion. Activity levels are reported as good. He worked in a [employer] for 17 years without limitation. He retired in 2020. He remains active around the house and in the garden without issue. He had no problem with housework post SEVERINO for him and his family. He denies any physical limitations.     He has been tolerating carvedilol and atorvastatin without issue. He is not checking blood pressures at home.     His brother in law is Dr. Thomas, a nephrologist in the community.    [place] interpretor, Luis [id number].    PHYSICAL EXAM:  Vitals:    02/15/22 1031   BP: 131/72   Pulse: 70   Resp: 20       Physical Exam  Constitutional:       Appearance: Normal appearance.   Neck:      Vascular: No carotid bruit or JVD.   Cardiovascular:      Rate and Rhythm: Normal rate and regular rhythm.      Pulses: Normal pulses.           Carotid pulses are 2+ on the right side and 2+ on the left side.       Radial pulses are 2+ on the right side and 2+ on the left side.        Dorsalis pedis pulses are 2+ on the right side and 2+ on the left side.        Posterior tibial pulses are 2+ on the right side and 2+ on the left side.      Heart sounds: S1 normal and S2 normal. No murmur heard.  No S3 sounds.    Pulmonary:      Effort: Pulmonary effort is normal.      Breath sounds: Normal breath sounds. No rales.   Feet:      Right foot:       Skin integrity: Skin integrity normal.      Left foot:      Skin integrity: Skin integrity normal.   Skin:     General: Skin is warm and dry.      Findings: No lesion.   Neurological:      Mental Status: He is alert and oriented to person, place, and time.      Motor: Motor function is intact.      Gait: Gait is intact.         LABS/CARDIAC TESTS:  August 2021 CBC demonstrates hemoglobin of 17. Normal iron profile.  CMP unremarkable with a creatinine 1.3 and a BUN of 16. GFR 87. Albumin 4.0.  October 2021  HDL 32. Triglycerides 198. A1c 5.2.    ECG November 2021 demonstrates sinus rhythm with no Q-waves or ST changes.  Left axis deviation.  Treadmill stress echocardiogram November 2021.  The patient exercised for 8 minutes and 19 seconds on a Yair protocol without chest pain.  ECG portion of study was negative for myocardial ischemia.  Baseline TTE demonstrated normal LV size and function with no significant valve disease.  Anterior wall hypokinesis was noted with stress.  Hypertensive response exercise.  Stress images not available for my review.  Abdominal ultrasound shows no evidence of AAA.  Right common iliac artery elevated velocities suggestive greater than 50% stenosis.  Cor CTA 12/2021 Nonobs ds w 25% stenosis at worst. Cor ca score 18.   CT Chest 2021 Aortic and coronary atherosclerosis noted.  Abd us 10/2021 No e/o AAA. Elevated velocities in R MELISSA.     ASSESSMENT & PLAN:    Coronary artery disease involving native coronary artery of native heart without angina pectoris  Nonobs, asymptomatic CAD. RF modification.     Primary hypertension  Bps reasonable today. Patient will buy a blood pressure cuff and start a home log. Cont carvedilol 6.25x2 for now.     Hyperlipidemia    LDL elevated. Tolerating atorvastatin 40x1. Increase atorvastatin 80x1.          Iliac artery stenosis, right  Asymptomatic, no claudication.       Iliac artery stenosis, right  -     Ambulatory referral/consult to  Cardiology    Coronary artery calcification seen on CAT scan  -     Ambulatory referral/consult to Cardiology  -     atorvastatin (LIPITOR) 80 MG tablet; Take 1 tablet (80 mg total) by mouth once daily.  Dispense: 90 tablet; Refill: 3    Coronary artery disease involving native coronary artery of native heart without angina pectoris  -     carvediloL (COREG) 6.25 MG tablet; Take 1 tablet (6.25 mg total) by mouth 2 (two) times daily with meals.  Dispense: 180 tablet; Refill: 3    Primary hypertension  -     carvediloL (COREG) 6.25 MG tablet; Take 1 tablet (6.25 mg total) by mouth 2 (two) times daily with meals.  Dispense: 180 tablet; Refill: 3    Hyperlipidemia, unspecified hyperlipidemia type  -     atorvastatin (LIPITOR) 80 MG tablet; Take 1 tablet (80 mg total) by mouth once daily.  Dispense: 90 tablet; Refill: 3        Sharlene Iglesias MD

## 2022-02-15 NOTE — ASSESSMENT & PLAN NOTE
Bps reasonable today. Patient will buy a blood pressure cuff and start a home log. Cont carvedilol 6.25x2 for now.

## 2022-02-22 ENCOUNTER — LAB VISIT (OUTPATIENT)
Dept: LAB | Facility: HOSPITAL | Age: 67
End: 2022-02-22
Attending: FAMILY MEDICINE
Payer: MEDICARE

## 2022-02-22 DIAGNOSIS — E78.5 HYPERLIPIDEMIA, UNSPECIFIED HYPERLIPIDEMIA TYPE: ICD-10-CM

## 2022-02-22 LAB
ALT SERPL W/O P-5'-P-CCNC: 18 U/L (ref 10–44)
AST SERPL-CCNC: 15 U/L (ref 10–40)
CHOLEST SERPL-MCNC: 119 MG/DL (ref 120–199)
CHOLEST/HDLC SERPL: 4.1 {RATIO} (ref 2–5)
HDLC SERPL-MCNC: 29 MG/DL (ref 40–75)
HDLC SERPL: 24.4 % (ref 20–50)
LDLC SERPL CALC-MCNC: 64.4 MG/DL (ref 63–159)
NONHDLC SERPL-MCNC: 90 MG/DL
TRIGL SERPL-MCNC: 128 MG/DL (ref 30–150)

## 2022-02-22 PROCEDURE — 80061 LIPID PANEL: CPT | Performed by: FAMILY MEDICINE

## 2022-02-22 PROCEDURE — 36415 COLL VENOUS BLD VENIPUNCTURE: CPT | Performed by: FAMILY MEDICINE

## 2022-02-22 PROCEDURE — 84460 ALANINE AMINO (ALT) (SGPT): CPT | Performed by: FAMILY MEDICINE

## 2022-02-22 PROCEDURE — 84450 TRANSFERASE (AST) (SGOT): CPT | Performed by: FAMILY MEDICINE

## 2022-03-03 ENCOUNTER — LAB VISIT (OUTPATIENT)
Dept: LAB | Facility: HOSPITAL | Age: 67
End: 2022-03-03
Payer: MEDICARE

## 2022-03-03 ENCOUNTER — OFFICE VISIT (OUTPATIENT)
Dept: INTERNAL MEDICINE | Facility: CLINIC | Age: 67
End: 2022-03-03
Payer: MEDICARE

## 2022-03-03 VITALS
DIASTOLIC BLOOD PRESSURE: 78 MMHG | HEART RATE: 59 BPM | SYSTOLIC BLOOD PRESSURE: 112 MMHG | HEIGHT: 72 IN | OXYGEN SATURATION: 98 % | WEIGHT: 156.94 LBS | BODY MASS INDEX: 21.26 KG/M2

## 2022-03-03 DIAGNOSIS — I77.1 ILIAC ARTERY STENOSIS, RIGHT: ICD-10-CM

## 2022-03-03 DIAGNOSIS — R94.4 DECREASED GFR: ICD-10-CM

## 2022-03-03 DIAGNOSIS — Z00.00 ENCOUNTER FOR PREVENTIVE HEALTH EXAMINATION: Primary | ICD-10-CM

## 2022-03-03 DIAGNOSIS — Z11.59 ENCOUNTER FOR HEPATITIS C SCREENING TEST FOR LOW RISK PATIENT: ICD-10-CM

## 2022-03-03 DIAGNOSIS — K63.5 POLYP OF DESCENDING COLON, UNSPECIFIED TYPE: ICD-10-CM

## 2022-03-03 DIAGNOSIS — E78.5 HYPERLIPIDEMIA, UNSPECIFIED HYPERLIPIDEMIA TYPE: ICD-10-CM

## 2022-03-03 DIAGNOSIS — I25.10 CORONARY ARTERY DISEASE INVOLVING NATIVE CORONARY ARTERY OF NATIVE HEART WITHOUT ANGINA PECTORIS: ICD-10-CM

## 2022-03-03 DIAGNOSIS — I10 PRIMARY HYPERTENSION: ICD-10-CM

## 2022-03-03 DIAGNOSIS — Z87.891 PERSONAL HISTORY OF NICOTINE DEPENDENCE: ICD-10-CM

## 2022-03-03 DIAGNOSIS — I70.0 AORTIC ATHEROSCLEROSIS: ICD-10-CM

## 2022-03-03 PROBLEM — M25.60 STIFFNESS DUE TO IMMOBILITY: Status: RESOLVED | Noted: 2021-11-08 | Resolved: 2022-03-03

## 2022-03-03 PROBLEM — R53.1 WEAKNESS: Status: RESOLVED | Noted: 2021-11-08 | Resolved: 2022-03-03

## 2022-03-03 PROBLEM — M79.601 PAIN OF RIGHT UPPER EXTREMITY: Status: RESOLVED | Noted: 2021-11-08 | Resolved: 2022-03-03

## 2022-03-03 PROBLEM — Z74.09 STIFFNESS DUE TO IMMOBILITY: Status: RESOLVED | Noted: 2021-11-08 | Resolved: 2022-03-03

## 2022-03-03 PROCEDURE — 99999 PR PBB SHADOW E&M-EST. PATIENT-LVL IV: ICD-10-PCS | Mod: PBBFAC,,, | Performed by: NURSE PRACTITIONER

## 2022-03-03 PROCEDURE — G0439 PPPS, SUBSEQ VISIT: HCPCS | Mod: S$GLB,,, | Performed by: NURSE PRACTITIONER

## 2022-03-03 PROCEDURE — 1101F PR PT FALLS ASSESS DOC 0-1 FALLS W/OUT INJ PAST YR: ICD-10-PCS | Mod: CPTII,S$GLB,, | Performed by: NURSE PRACTITIONER

## 2022-03-03 PROCEDURE — 3078F DIAST BP <80 MM HG: CPT | Mod: CPTII,S$GLB,, | Performed by: NURSE PRACTITIONER

## 2022-03-03 PROCEDURE — 1101F PT FALLS ASSESS-DOCD LE1/YR: CPT | Mod: CPTII,S$GLB,, | Performed by: NURSE PRACTITIONER

## 2022-03-03 PROCEDURE — 3078F PR MOST RECENT DIASTOLIC BLOOD PRESSURE < 80 MM HG: ICD-10-PCS | Mod: CPTII,S$GLB,, | Performed by: NURSE PRACTITIONER

## 2022-03-03 PROCEDURE — 3074F PR MOST RECENT SYSTOLIC BLOOD PRESSURE < 130 MM HG: ICD-10-PCS | Mod: CPTII,S$GLB,, | Performed by: NURSE PRACTITIONER

## 2022-03-03 PROCEDURE — 99499 UNLISTED E&M SERVICE: CPT | Mod: S$GLB,,, | Performed by: NURSE PRACTITIONER

## 2022-03-03 PROCEDURE — 1159F MED LIST DOCD IN RCRD: CPT | Mod: CPTII,S$GLB,, | Performed by: NURSE PRACTITIONER

## 2022-03-03 PROCEDURE — 1126F PR PAIN SEVERITY QUANTIFIED, NO PAIN PRESENT: ICD-10-PCS | Mod: CPTII,S$GLB,, | Performed by: NURSE PRACTITIONER

## 2022-03-03 PROCEDURE — 3008F BODY MASS INDEX DOCD: CPT | Mod: CPTII,S$GLB,, | Performed by: NURSE PRACTITIONER

## 2022-03-03 PROCEDURE — 1160F RVW MEDS BY RX/DR IN RCRD: CPT | Mod: CPTII,S$GLB,, | Performed by: NURSE PRACTITIONER

## 2022-03-03 PROCEDURE — 99999 PR PBB SHADOW E&M-EST. PATIENT-LVL IV: CPT | Mod: PBBFAC,,, | Performed by: NURSE PRACTITIONER

## 2022-03-03 PROCEDURE — 1126F AMNT PAIN NOTED NONE PRSNT: CPT | Mod: CPTII,S$GLB,, | Performed by: NURSE PRACTITIONER

## 2022-03-03 PROCEDURE — 3288F FALL RISK ASSESSMENT DOCD: CPT | Mod: CPTII,S$GLB,, | Performed by: NURSE PRACTITIONER

## 2022-03-03 PROCEDURE — 3288F PR FALLS RISK ASSESSMENT DOCUMENTED: ICD-10-PCS | Mod: CPTII,S$GLB,, | Performed by: NURSE PRACTITIONER

## 2022-03-03 PROCEDURE — 1170F PR FUNCTIONAL STATUS ASSESSED: ICD-10-PCS | Mod: CPTII,S$GLB,, | Performed by: NURSE PRACTITIONER

## 2022-03-03 PROCEDURE — 99499 RISK ADDL DX/OHS AUDIT: ICD-10-PCS | Mod: S$GLB,,, | Performed by: NURSE PRACTITIONER

## 2022-03-03 PROCEDURE — G0439 PR MEDICARE ANNUAL WELLNESS SUBSEQUENT VISIT: ICD-10-PCS | Mod: S$GLB,,, | Performed by: NURSE PRACTITIONER

## 2022-03-03 PROCEDURE — 1170F FXNL STATUS ASSESSED: CPT | Mod: CPTII,S$GLB,, | Performed by: NURSE PRACTITIONER

## 2022-03-03 PROCEDURE — 86803 HEPATITIS C AB TEST: CPT | Performed by: NURSE PRACTITIONER

## 2022-03-03 PROCEDURE — 1159F PR MEDICATION LIST DOCUMENTED IN MEDICAL RECORD: ICD-10-PCS | Mod: CPTII,S$GLB,, | Performed by: NURSE PRACTITIONER

## 2022-03-03 PROCEDURE — 36415 COLL VENOUS BLD VENIPUNCTURE: CPT | Performed by: NURSE PRACTITIONER

## 2022-03-03 PROCEDURE — 3074F SYST BP LT 130 MM HG: CPT | Mod: CPTII,S$GLB,, | Performed by: NURSE PRACTITIONER

## 2022-03-03 PROCEDURE — 3008F PR BODY MASS INDEX (BMI) DOCUMENTED: ICD-10-PCS | Mod: CPTII,S$GLB,, | Performed by: NURSE PRACTITIONER

## 2022-03-03 PROCEDURE — 1160F PR REVIEW ALL MEDS BY PRESCRIBER/CLIN PHARMACIST DOCUMENTED: ICD-10-PCS | Mod: CPTII,S$GLB,, | Performed by: NURSE PRACTITIONER

## 2022-03-03 NOTE — PATIENT INSTRUCTIONS
Counseling and Referral of Other Preventative  (Italic type indicates deductible and co-insurance are waived)    Patient Name: Guillaume Salinas  Today's Date: 3/3/2022    Health Maintenance       Date Due Completion Date    Hepatitis C Screening Never done ---    TETANUS VACCINE Never done ---    Shingles Vaccine (1 of 2) Never done ---    COVID-19 Vaccine (4 - Booster for Moderna series) 03/29/2022 10/29/2021    Aspirin/Antiplatelet Therapy 06/01/2022 (Originally 8/21/1973) ---    Pneumococcal Vaccines (Age 65+) (2 of 2 - PPSV23) 10/19/2022 10/19/2021    High Dose Statin 02/15/2023 2/15/2022    Colorectal Cancer Screening 01/05/2025 1/5/2022    Lipid Panel 02/22/2027 2/22/2022        Orders Placed This Encounter   Procedures    HEPATITIS C ANTIBODY     The following information is provided to all patients.  This information is to help you find resources for any of the problems found today that may be affecting your health:                Living healthy guide: www.[facility].[place].gov      Understanding Diabetes: www.diabetes.org      Eating healthy: www.cdc.gov/healthyweight      CDC home safety checklist: www.cdc.gov/steadi/patient.html      Agency on Aging: www.goea.[place].gov      Alcoholics anonymous (AA): www.aa.org      Physical Activity: www.susanne.nih.gov/mj7kwrf      Tobacco use: www.quitwithusla.org

## 2022-03-03 NOTE — PROGRESS NOTES
Guillaume Betsylucio Salinas presented for a  Medicare AWV and comprehensive Health Risk Assessment today. The following components were reviewed and updated. Assisted by :    · Medical history  · Family History  · Social history  · Allergies and Current Medications  · Health Risk Assessment  · Health Maintenance  · Care Team         ** See Completed Assessments for Annual Wellness Visit within the encounter summary.**         The following assessments were completed:  · Living Situation  · CAGE  · Depression Screening  · Timed Get Up and Go  · Whisper Test  · Cognitive Function Screening       ·   · Nutrition Screening  · ADL Screening  · PAQ Screening        Vitals:    03/03/22 0948 03/03/22 1006   BP:  112/78   BP Location:  Right arm   Patient Position:  Sitting   Pulse:  (!) 59   SpO2:  98%   Weight: 71.2 kg (156 lb 15.5 oz)    Height: 6' (1.829 m)      Body mass index is 21.29 kg/m².  Physical Exam  Vitals and nursing note reviewed.   Constitutional:       Appearance: He is well-developed.   HENT:      Head: Normocephalic.   Cardiovascular:      Rate and Rhythm: Normal rate and regular rhythm.      Heart sounds: Normal heart sounds. No murmur heard.  Pulmonary:      Effort: Pulmonary effort is normal.      Breath sounds: Normal breath sounds.   Abdominal:      General: Bowel sounds are normal.      Palpations: Abdomen is soft. There is no mass.   Musculoskeletal:         General: Normal range of motion.   Neurological:      Mental Status: He is alert and oriented to person, place, and time.      Motor: No abnormal muscle tone.               Diagnoses and health risks identified today and associated recommendations/orders:    1. Encounter for preventive health examination  Here for Health Risk Assessment/Annual Wellness Visit.  Health maintenance reviewed and updated. Follow up in one year.  Seen today with aid of .  Prescription given for TDAP, Shingrix.    2. Primary  hypertension  Chronic, stable on current medications. Followed by PCP.    3. Aortic atherosclerosis  Chronic, stable on current medications. Noted CT Chest/Lung 10/26/21. Followed by PCP, Cardiology.    4. Coronary artery disease involving native coronary artery of native heart without angina pectoris  Chronic, stable on current medications. Followed by PCP, Cardiology.    5. Iliac artery stenosis, right  Chronic, stable on current medications. Followed by PCP, Cardiology.    6. Hyperlipidemia, unspecified hyperlipidemia type  Chronic, stable on current medication. Followed by PCP, Cardiology.    7. Decreased GFR  New onset. Followed by PCP    8. Polyp of descending colon, unspecified type  Next colonoscopy due 1/2025. Followed by PCP.    9. Personal history of nicotine dependence  Chronic, continues to smoke 1 1/2 PPD. Declined referral to Smoking Cessation. Counseled to stop smoking. Followed by PCP.    10. Encounter for hepatitis C screening test for low risk patient  - HEPATITIS C ANTIBODY; Future      Provided Guillaume with a 5-10 year written screening schedule and personal prevention plan. Recommendations were developed using the USPSTF age appropriate recommendations. Education, counseling, and referrals were provided as needed. After Visit Summary printed and given to patient which includes a list of additional screenings\tests needed.    Follow up in 3 months (on 5/23/2022).with PCP    Erika Wynne NP

## 2022-03-04 LAB — HCV AB SERPL QL IA: NEGATIVE

## 2022-06-13 ENCOUNTER — OFFICE VISIT (OUTPATIENT)
Dept: INTERNAL MEDICINE | Facility: CLINIC | Age: 67
End: 2022-06-13
Attending: FAMILY MEDICINE
Payer: MEDICARE

## 2022-06-13 VITALS
BODY MASS INDEX: 23.84 KG/M2 | WEIGHT: 160.94 LBS | HEIGHT: 69 IN | HEART RATE: 80 BPM | OXYGEN SATURATION: 98 % | DIASTOLIC BLOOD PRESSURE: 70 MMHG | SYSTOLIC BLOOD PRESSURE: 116 MMHG

## 2022-06-13 DIAGNOSIS — I25.10 CORONARY ARTERY DISEASE INVOLVING NATIVE CORONARY ARTERY OF NATIVE HEART WITHOUT ANGINA PECTORIS: ICD-10-CM

## 2022-06-13 DIAGNOSIS — G47.00 INSOMNIA, UNSPECIFIED TYPE: Primary | ICD-10-CM

## 2022-06-13 DIAGNOSIS — I10 HYPERTENSION, ESSENTIAL: ICD-10-CM

## 2022-06-13 DIAGNOSIS — E78.5 HYPERLIPIDEMIA, UNSPECIFIED HYPERLIPIDEMIA TYPE: ICD-10-CM

## 2022-06-13 DIAGNOSIS — F17.200 NEEDS SMOKING CESSATION EDUCATION: ICD-10-CM

## 2022-06-13 DIAGNOSIS — I77.1 ILIAC ARTERY STENOSIS, RIGHT: ICD-10-CM

## 2022-06-13 DIAGNOSIS — R20.9 ABNORMAL SENSATION OF LEG: ICD-10-CM

## 2022-06-13 PROCEDURE — 1101F PT FALLS ASSESS-DOCD LE1/YR: CPT | Mod: CPTII,S$GLB,, | Performed by: FAMILY MEDICINE

## 2022-06-13 PROCEDURE — 99214 OFFICE O/P EST MOD 30 MIN: CPT | Mod: S$GLB,,, | Performed by: FAMILY MEDICINE

## 2022-06-13 PROCEDURE — 1126F PR PAIN SEVERITY QUANTIFIED, NO PAIN PRESENT: ICD-10-PCS | Mod: CPTII,S$GLB,, | Performed by: FAMILY MEDICINE

## 2022-06-13 PROCEDURE — 99214 PR OFFICE/OUTPT VISIT, EST, LEVL IV, 30-39 MIN: ICD-10-PCS | Mod: S$GLB,,, | Performed by: FAMILY MEDICINE

## 2022-06-13 PROCEDURE — 3074F SYST BP LT 130 MM HG: CPT | Mod: CPTII,S$GLB,, | Performed by: FAMILY MEDICINE

## 2022-06-13 PROCEDURE — 1159F PR MEDICATION LIST DOCUMENTED IN MEDICAL RECORD: ICD-10-PCS | Mod: CPTII,S$GLB,, | Performed by: FAMILY MEDICINE

## 2022-06-13 PROCEDURE — 3074F PR MOST RECENT SYSTOLIC BLOOD PRESSURE < 130 MM HG: ICD-10-PCS | Mod: CPTII,S$GLB,, | Performed by: FAMILY MEDICINE

## 2022-06-13 PROCEDURE — 3288F PR FALLS RISK ASSESSMENT DOCUMENTED: ICD-10-PCS | Mod: CPTII,S$GLB,, | Performed by: FAMILY MEDICINE

## 2022-06-13 PROCEDURE — 3078F PR MOST RECENT DIASTOLIC BLOOD PRESSURE < 80 MM HG: ICD-10-PCS | Mod: CPTII,S$GLB,, | Performed by: FAMILY MEDICINE

## 2022-06-13 PROCEDURE — 3008F PR BODY MASS INDEX (BMI) DOCUMENTED: ICD-10-PCS | Mod: CPTII,S$GLB,, | Performed by: FAMILY MEDICINE

## 2022-06-13 PROCEDURE — 3078F DIAST BP <80 MM HG: CPT | Mod: CPTII,S$GLB,, | Performed by: FAMILY MEDICINE

## 2022-06-13 PROCEDURE — 1126F AMNT PAIN NOTED NONE PRSNT: CPT | Mod: CPTII,S$GLB,, | Performed by: FAMILY MEDICINE

## 2022-06-13 PROCEDURE — 1160F PR REVIEW ALL MEDS BY PRESCRIBER/CLIN PHARMACIST DOCUMENTED: ICD-10-PCS | Mod: CPTII,S$GLB,, | Performed by: FAMILY MEDICINE

## 2022-06-13 PROCEDURE — 1159F MED LIST DOCD IN RCRD: CPT | Mod: CPTII,S$GLB,, | Performed by: FAMILY MEDICINE

## 2022-06-13 PROCEDURE — 1160F RVW MEDS BY RX/DR IN RCRD: CPT | Mod: CPTII,S$GLB,, | Performed by: FAMILY MEDICINE

## 2022-06-13 PROCEDURE — 1101F PR PT FALLS ASSESS DOC 0-1 FALLS W/OUT INJ PAST YR: ICD-10-PCS | Mod: CPTII,S$GLB,, | Performed by: FAMILY MEDICINE

## 2022-06-13 PROCEDURE — 99999 PR PBB SHADOW E&M-EST. PATIENT-LVL III: ICD-10-PCS | Mod: PBBFAC,,, | Performed by: FAMILY MEDICINE

## 2022-06-13 PROCEDURE — 3288F FALL RISK ASSESSMENT DOCD: CPT | Mod: CPTII,S$GLB,, | Performed by: FAMILY MEDICINE

## 2022-06-13 PROCEDURE — 3008F BODY MASS INDEX DOCD: CPT | Mod: CPTII,S$GLB,, | Performed by: FAMILY MEDICINE

## 2022-06-13 PROCEDURE — 99999 PR PBB SHADOW E&M-EST. PATIENT-LVL III: CPT | Mod: PBBFAC,,, | Performed by: FAMILY MEDICINE

## 2022-06-13 RX ORDER — TRAZODONE HYDROCHLORIDE 50 MG/1
50 TABLET ORAL NIGHTLY PRN
Qty: 30 TABLET | Refills: 1 | Status: SHIPPED | OUTPATIENT
Start: 2022-06-13 | End: 2023-04-17

## 2022-06-13 NOTE — PROGRESS NOTES
Subjective:       Patient ID: Guillaume Salinas is a 66 y.o. male.    Chief Complaint: Follow-up (6 month f/u) and Insomnia    Established patient follows up for management of chronic medical illnesses with complaints today. Please see dictation and ROS for interval problems, specific complaints and disease management discussion.    P, S, Fm, Soc Hx's; Meds, allergies reviewed and reconciled.  Health maintenance file reviewed and addressed items due. Recent applicable lab, imaging and cardiovascular results reviewed.  Problem list items reviewed and modified or added entries (in the overview section) may not be transcribed into this encounter note due to note writer format.    Presents his wife who translates.  Complains of poor sleep.  Restless legs at night.  Not painful but more of a sensation.  Recently saw cardiologist.  Recommendations and medication changes noted.  Currently taking gabapentin prescribed by brother.  Not particularly helpful.  No claudication reported.  Had  Review of Systems   Constitutional: Positive for fatigue. Negative for appetite change, chills, diaphoresis and fever.   HENT: Negative for congestion, postnasal drip, rhinorrhea, sore throat and trouble swallowing.    Eyes: Negative for visual disturbance.   Respiratory: Negative for cough, choking, chest tightness, shortness of breath and wheezing.    Cardiovascular: Negative for chest pain and leg swelling.   Gastrointestinal: Negative for abdominal distention, abdominal pain, diarrhea, nausea and vomiting.   Genitourinary: Negative for difficulty urinating and hematuria.   Musculoskeletal: Positive for myalgias. Negative for arthralgias.   Skin: Negative for rash.   Neurological: Negative for weakness, light-headedness and headaches.   Psychiatric/Behavioral: Positive for dysphoric mood and sleep disturbance. Negative for confusion.       Objective:      Physical Exam  Vitals and nursing note reviewed.   Constitutional:        General: He is not in acute distress.     Appearance: He is well-developed.   Cardiovascular:      Pulses:           Dorsalis pedis pulses are 1+ on the right side and 1+ on the left side.   Pulmonary:      Effort: Pulmonary effort is normal.   Musculoskeletal:      Cervical back: Neck supple.      Right lower leg: No edema.      Left lower leg: No edema.   Feet:      Right foot:      Protective Sensation: 3 sites tested. 3 sites sensed.      Skin integrity: No skin breakdown.      Left foot:      Protective Sensation: 3 sites tested. 3 sites sensed.      Skin integrity: No skin breakdown.   Skin:     General: Skin is warm and dry.      Findings: No rash.   Neurological:      Mental Status: He is alert and oriented to person, place, and time.   Psychiatric:         Behavior: Behavior normal.         Thought Content: Thought content normal.         Judgment: Judgment normal.         Assessment:       1. Insomnia, unspecified type    2. Iliac artery stenosis, right    3. Coronary artery disease involving native coronary artery of native heart without angina pectoris    4. Hypertension, essential    5. Hyperlipidemia, unspecified hyperlipidemia type    6. Abnormal sensation of leg        Plan:     Medication List with Changes/Refills   New Medications    TRAZODONE (DESYREL) 50 MG TABLET    Take 1 tablet (50 mg total) by mouth nightly as needed for Insomnia.   Current Medications    ATORVASTATIN (LIPITOR) 80 MG TABLET    Take 1 tablet (80 mg total) by mouth once daily.    CARVEDILOL (COREG) 6.25 MG TABLET    Take 1 tablet (6.25 mg total) by mouth 2 (two) times daily with meals.    GABAPENTIN (NEURONTIN) 100 MG CAPSULE    Take 1 capsule (100 mg total) by mouth 3 (three) times daily.     Guillaume was seen today for follow-up and insomnia.    Diagnoses and all orders for this visit:    Insomnia, unspecified type    Iliac artery stenosis, right    Coronary artery disease involving native coronary artery of native heart  without angina pectoris    Hypertension, essential    Hyperlipidemia, unspecified hyperlipidemia type    Abnormal sensation of leg  Comments:  nocturnal - dysesthesia 'restless legs' ?    Other orders  -     traZODone (DESYREL) 50 MG tablet; Take 1 tablet (50 mg total) by mouth nightly as needed for Insomnia.      See meds, orders, follow up, routing and instructions sections of encounter and AVS. Discussed with patient and provided on AVS.    Discussed diet and exercise and links provided on AVS for detailed information.    Lab Results   Component Value Date     08/27/2021     08/27/2021     08/27/2021    K 4.4 08/27/2021    K 4.4 08/27/2021    K 4.4 08/27/2021     08/27/2021     08/27/2021     08/27/2021    BUN 16 08/27/2021    BUN 16 08/27/2021    BUN 16 08/27/2021    CREATININE 1.3 08/27/2021    CREATININE 1.3 08/27/2021    CREATININE 1.3 08/27/2021     (H) 08/27/2021     (H) 08/27/2021     (H) 08/27/2021    HGBA1C 5.2 10/19/2021    MG 2.2 08/27/2021    MG 2.2 08/27/2021    MG 2.2 08/27/2021    AST 15 02/22/2022    ALT 18 02/22/2022    ALBUMIN 4.0 08/27/2021    ALBUMIN 4.0 08/27/2021    ALBUMIN 4.0 08/27/2021    ALBUMIN 4.0 08/27/2021    PROT 7.7 08/27/2021    PROT 7.7 08/27/2021    PROT 7.7 08/27/2021    BILITOT 0.6 08/27/2021    BILITOT 0.6 08/27/2021    BILITOT 0.6 08/27/2021    CHOL 119 (L) 02/22/2022    HDL 29 (L) 02/22/2022    LDLCALC 64.4 02/22/2022    TRIG 128 02/22/2022    WBC 7.41 08/27/2021    WBC 7.41 08/27/2021    WBC 7.41 08/27/2021    HGB 17.0 08/27/2021    HGB 17.0 08/27/2021    HGB 17.0 08/27/2021    HCT 51.5 08/27/2021    HCT 51.5 08/27/2021    HCT 51.5 08/27/2021     08/27/2021     08/27/2021     08/27/2021    PSA 2.5 10/19/2021       Trial of trazodone for sleep.  Continue gabapentin.  Increase hydration and reduce caffeine intake.  Encourage patient to stop smoking.  Follow-up in 6 months.  Notify me by patient  portal message about trazodone effectiveness could increase dose as needed.

## 2022-09-07 RX ORDER — GABAPENTIN 100 MG/1
100 CAPSULE ORAL 3 TIMES DAILY
Qty: 90 CAPSULE | Refills: 11 | Status: SHIPPED | OUTPATIENT
Start: 2022-09-07 | End: 2022-09-26 | Stop reason: DRUGHIGH

## 2022-09-07 NOTE — TELEPHONE ENCOUNTER
----- Message from Lis Zambrano sent at 9/7/2022 11:31 AM CDT -----  Regarding: REFILL ON MEDS  Contact: PT  gabapentin (NEURONTIN) 100 MG capsule      [id number] [place] - [address] AT [facility]  [address] [address]  Phone: [phone] Fax: [phone]    Confirmed contact info below:  Contact Name: Guillaume Salinas  Phone Number: [phone]

## 2022-09-07 NOTE — TELEPHONE ENCOUNTER
No new care gaps identified.  [facility] Embedded Care Gaps. Reference number: [id number]. 9/07/2022   11:36:44 AM TUNGT

## 2022-09-15 ENCOUNTER — TELEPHONE (OUTPATIENT)
Dept: INTERNAL MEDICINE | Facility: CLINIC | Age: 67
End: 2022-09-15
Payer: MEDICARE

## 2022-09-15 NOTE — TELEPHONE ENCOUNTER
----- Message from Laxmi Andrews sent at 9/15/2022 11:48 AM CDT -----  Regarding: Questions and concernsn  Contact: [phone]  Patients wife is calling. Patient is having problems sleeping and would like to speak with the office. Please call [phone]     Thank You

## 2022-09-15 NOTE — TELEPHONE ENCOUNTER
Wife states pt hasn't been able to speak in months. States when pt saw PCP last, he prescribed Trazadone and it hasn't helped at all. States they were told to call back if the medication didn't work and he would prescribe a different Rx.

## 2022-09-26 RX ORDER — GABAPENTIN 300 MG/1
300 CAPSULE ORAL 3 TIMES DAILY
Qty: 90 CAPSULE | Refills: 11 | Status: SHIPPED | OUTPATIENT
Start: 2022-09-26 | End: 2023-10-18

## 2022-09-29 NOTE — TELEPHONE ENCOUNTER
Please schedule patient for a virtual telemedicine (or audio if cannot do tele) visit with me, soon. Thank you.    He can try taking 2 of the 50 mg trazodone tablets and see if that works in the meantime.    Thank you.

## 2022-10-03 NOTE — TELEPHONE ENCOUNTER
Pt scheduled for VV. Relayed message from PCP:    He can try taking 2 of the 50 mg trazodone tablets and see if that works in the meantime.     Wife verbalized understanding.

## 2022-11-28 ENCOUNTER — HOSPITAL ENCOUNTER (OUTPATIENT)
Dept: RADIOLOGY | Facility: HOSPITAL | Age: 67
Discharge: HOME | End: 2022-11-28
Attending: FAMILY MEDICINE
Payer: MEDICARE

## 2022-11-28 ENCOUNTER — TELEPHONE (OUTPATIENT)
Dept: INTERNAL MEDICINE | Facility: CLINIC | Age: 67
End: 2022-11-28
Payer: MEDICARE

## 2022-11-28 DIAGNOSIS — Z87.891 PERSONAL HISTORY OF NICOTINE DEPENDENCE: ICD-10-CM

## 2022-11-28 DIAGNOSIS — I25.10 CORONARY ARTERY CALCIFICATION SEEN ON CAT SCAN: ICD-10-CM

## 2022-11-28 DIAGNOSIS — Z87.891 PERSONAL HISTORY OF NICOTINE DEPENDENCE: Primary | ICD-10-CM

## 2022-11-28 DIAGNOSIS — E78.5 HYPERLIPIDEMIA, UNSPECIFIED HYPERLIPIDEMIA TYPE: ICD-10-CM

## 2022-11-28 PROCEDURE — 71271 CT CHEST LUNG SCREENING LOW DOSE: ICD-10-PCS | Mod: 26,,, | Performed by: RADIOLOGY

## 2022-11-28 PROCEDURE — 71271 CT THORAX LUNG CANCER SCR C-: CPT | Mod: 26,,, | Performed by: RADIOLOGY

## 2022-11-28 PROCEDURE — 71271 CT THORAX LUNG CANCER SCR C-: CPT | Mod: TC

## 2022-11-28 RX ORDER — ATORVASTATIN CALCIUM 40 MG/1
TABLET, FILM COATED ORAL
Qty: 90 TABLET | Refills: 3 | OUTPATIENT
Start: 2022-11-28

## 2022-11-28 NOTE — TELEPHONE ENCOUNTER
No new care gaps identified.  [facility] Embedded Care Gaps. Reference number: [id number]. 11/28/2022   9:42:53 AM CST

## 2022-11-29 NOTE — TELEPHONE ENCOUNTER
Please CALL patient and report that there were no new concerning areas on chest CT scan. Any previously reported abnormalities remain unchanged or stable. I recommend a repeat scan in 1 year to continue surveillance.     Please schedule follow up scan in 1 year - see orders. Thank you

## 2022-11-30 ENCOUNTER — PATIENT MESSAGE (OUTPATIENT)
Dept: INTERNAL MEDICINE | Facility: CLINIC | Age: 67
End: 2022-11-30
Payer: MEDICARE

## 2023-01-04 ENCOUNTER — PES CALL (OUTPATIENT)
Dept: ADMINISTRATIVE | Facility: CLINIC | Age: 68
End: 2023-01-04
Payer: MEDICARE

## 2023-02-21 DIAGNOSIS — I25.10 CORONARY ARTERY CALCIFICATION SEEN ON CAT SCAN: ICD-10-CM

## 2023-02-21 DIAGNOSIS — E78.5 HYPERLIPIDEMIA, UNSPECIFIED HYPERLIPIDEMIA TYPE: ICD-10-CM

## 2023-02-21 NOTE — TELEPHONE ENCOUNTER
No new care gaps identified.  [facility] Embedded Care Gaps. Reference number: [id number]. 2/21/2023   10:41:56 AM CST

## 2023-02-22 RX ORDER — ATORVASTATIN CALCIUM 40 MG/1
TABLET, FILM COATED ORAL
Qty: 90 TABLET | Refills: 3 | OUTPATIENT
Start: 2023-02-22

## 2023-02-22 NOTE — TELEPHONE ENCOUNTER
-Refill request received and reviewed, corrupt or incomplete information provided on request. Please note - this could also include dose that conflicts with current med list.  -Please verify and correct and resubmit for approval. Thank you.

## 2023-02-22 NOTE — TELEPHONE ENCOUNTER
Refill Routing Note   Medication(s) are not appropriate for processing by [facility] for the following reason(s):       Required labs outdated    [facility] action(s):  Defer         Appointments  past 12m or future 3m with PCP    Date Provider   Last Visit   6/13/2022 Kal Hargrove MD   Next Visit   Visit date not found Kal Hargrove MD   ED visits in past 90 days: 0        Note composed:8:34 PM 02/21/2023

## 2023-03-31 DIAGNOSIS — I10 HYPERTENSION, ESSENTIAL: ICD-10-CM

## 2023-03-31 DIAGNOSIS — I73.9 CLAUDICATION: ICD-10-CM

## 2023-03-31 DIAGNOSIS — E78.5 HYPERLIPIDEMIA, UNSPECIFIED HYPERLIPIDEMIA TYPE: Primary | ICD-10-CM

## 2023-04-05 DIAGNOSIS — D64.9 SYMPTOMATIC ANEMIA: Primary | ICD-10-CM

## 2023-04-05 DIAGNOSIS — D53.9 NUTRITIONAL ANEMIA, UNSPECIFIED: ICD-10-CM

## 2023-04-12 NOTE — H&P (VIEW-ONLY)
"PATIENT: Guillaume Salinas  MRN: [id number]  DATE: 4/13/2023    Diagnosis:   1. Anemia, unspecified type    2. Chronic kidney disease, stage 3a    3. Nutritional anemia, unspecified    4. Encounter for screening for malignant neoplasm of prostate    5. Advance care planning      Chief Complaint: Anemia    Oncologic History:      Oncologic History     Oncologic Treatment     Pathology       Subjective:    History of Present Illness: Mr. Betsy Salinas is a 67 y.o. male who presents for evaluation and management of anemia.    [I do not see evidence of anemia in his labs, but he was referred for anemia workup.]    - he went to [place] for a dental procedure. He had several teeth removed ("an intense procedure per his wife"). He had taken antibiotics/amoxicillin and ibuprofen. He had the second part of procedure about a week later. He noted severe fatigue, weakness.    - today, he is doing well. He denies shortness of breath, chest pain, nausea, vomiting, diarrhea, constipation.        Past medical, surgical, family, and social histories have been reviewed and updated below.    Past Medical History:   Past Medical History:   Diagnosis Date    Hypertension        Past Surgical History:   Past Surgical History:   Procedure Laterality Date    COLONOSCOPY N/A 1/5/2022    Procedure: COLONOSCOPY Golytely Vaccinated will bring cards;  Surgeon: Dereje Simon MD;  Location: [facility];  Service: Endoscopy;  Laterality: N/A;  Do not cancel this order       Family History:   Family History   Problem Relation Age of Onset    Hypertension Mother     Cancer Father     Cancer Brother     No Known Problems Daughter     No Known Problems Daughter     No Known Problems Son        Social History:  reports that he has been smoking cigarettes. He has been smoking an average of 1.5 packs per day. He has never used smokeless tobacco. He reports that he does not currently use alcohol. He reports that he does not use " drugs.    Allergies:  Review of patient's allergies indicates:  No Known Allergies    Medications:  Current Outpatient Medications   Medication Sig Dispense Refill    atorvastatin (LIPITOR) 80 MG tablet Take 1 tablet (80 mg total) by mouth once daily. 90 tablet 3    carvediloL (COREG) 6.25 MG tablet TAKE 1 TABLET(6.25 MG) BY MOUTH TWICE DAILY WITH MEALS 180 tablet 3    gabapentin (NEURONTIN) 300 MG capsule Take 1 capsule (300 mg total) by mouth 3 (three) times daily. 90 capsule 11    traZODone (DESYREL) 50 MG tablet Take 1 tablet (50 mg total) by mouth nightly as needed for Insomnia. 30 tablet 1     No current facility-administered medications for this visit.       Review of Systems   Constitutional:  Negative for fatigue.   HENT:  Negative for sore throat.    Eyes:  Negative for visual disturbance.   Respiratory:  Negative for shortness of breath.    Cardiovascular:  Negative for chest pain.   Gastrointestinal:  Negative for abdominal pain.   Genitourinary:  Negative for dysuria.   Musculoskeletal:  Negative for back pain.   Skin:  Negative for rash.   Neurological:  Negative for headaches.   Hematological:  Negative for adenopathy.   Psychiatric/Behavioral:  The patient is not nervous/anxious.      ECOG Performance Status:   ECOG SCORE    0 - Fully active-able to carry on all pre-disease performance without restriction         Objective:      Vitals:   Vitals:    04/13/23 1304   BP: (!) 106/59   Pulse: 83   SpO2: 100%   Weight: 69.7 kg (153 lb 10.6 oz)     BMI: Body mass index is 22.69 kg/m².    Physical Exam  Vitals and nursing note reviewed.   Constitutional:       Appearance: He is well-developed.   HENT:      Head: Normocephalic and atraumatic.   Eyes:      Pupils: Pupils are equal, round, and reactive to light.   Cardiovascular:      Rate and Rhythm: Normal rate and regular rhythm.   Pulmonary:      Effort: Pulmonary effort is normal.      Breath sounds: Normal breath sounds.   Abdominal:      General: Bowel  "sounds are normal.      Palpations: Abdomen is soft.   Musculoskeletal:         General: Normal range of motion.      Cervical back: Normal range of motion and neck supple.   Skin:     General: Skin is warm and dry.   Neurological:      Mental Status: He is alert and oriented to person, place, and time.   Psychiatric:         Behavior: Behavior normal.         Thought Content: Thought content normal.         Judgment: Judgment normal.       Laboratory Data:  Labs have been reviewed.    Lab Results   Component Value Date    WBC 7.41 08/27/2021    WBC 7.41 08/27/2021    WBC 7.41 08/27/2021    HGB 17.0 08/27/2021    HGB 17.0 08/27/2021    HGB 17.0 08/27/2021    HCT 51.5 08/27/2021    HCT 51.5 08/27/2021    HCT 51.5 08/27/2021    MCV 98 08/27/2021    MCV 98 08/27/2021    MCV 98 08/27/2021     08/27/2021     08/27/2021     08/27/2021           Imaging:        Assessment:       1. Anemia, unspecified type    2. Chronic kidney disease, stage 3a    3. Nutritional anemia, unspecified    4. Encounter for screening for malignant neoplasm of prostate    5. Advance care planning           Plan:     Anemia  [I do not see evidence of anemia in his labs, but he was referred for anemia workup.]  - he went to [place] for a dental procedure. He had several teeth removed ("an intense procedure per his wife"). He had taken antibiotics/amoxicillin and ibuprofen. He had the second part of procedure about a week later. He noted severe fatigue, weakness. Labs revealed a hemoglobin of 6.8 g/dL. Follow up labs revealed a decrease to 6.2 g/dL. He received 2 units of blood last week.  - I have ordered an extensive laboratory workup  - I will contact him with results of testing. If needed, I will schedule a follow-up appointment.    2. Chronic kidney disease stage 3a  - last eGFR was 57 ml/min/m2  - defer management to nephrology    3. Advance Care Planning     Date: 04/13/2023    Power of   I initiated the process " of advance care planning today and explained the importance of this process to the patient.  I introduced the concept of advance directives to the patient, as well. Then the patient received detailed information about the importance of designating a Health Care Power of  (HCPOA). He was also instructed to communicate with this person about their wishes for future healthcare, should he become sick and lose decision-making capacity. The patient has not previously appointed a HCPOA. After our discussion, the patient has decided not to complete a HCPOA.          - I will contact him with results of testing. If needed, I will schedule a follow-up appointment.      Harry Diamond M.D.  Hematology/Oncology  [facility] - [address], [address]  [place] [address]  Phone: [phone]  Fax: [phone]

## 2023-04-12 NOTE — PROGRESS NOTES
"PATIENT: Guillaume Salinas  MRN: [id number]  DATE: 4/13/2023    Diagnosis:   1. Anemia, unspecified type    2. Chronic kidney disease, stage 3a    3. Nutritional anemia, unspecified    4. Encounter for screening for malignant neoplasm of prostate    5. Advance care planning      Chief Complaint: Anemia    Oncologic History:      Oncologic History     Oncologic Treatment     Pathology       Subjective:    History of Present Illness: Mr. Betsy Salinas is a 67 y.o. male who presents for evaluation and management of anemia.    [I do not see evidence of anemia in his labs, but he was referred for anemia workup.]    - he went to [place] for a dental procedure. He had several teeth removed ("an intense procedure per his wife"). He had taken antibiotics/amoxicillin and ibuprofen. He had the second part of procedure about a week later. He noted severe fatigue, weakness.    - today, he is doing well. He denies shortness of breath, chest pain, nausea, vomiting, diarrhea, constipation.        Past medical, surgical, family, and social histories have been reviewed and updated below.    Past Medical History:   Past Medical History:   Diagnosis Date    Hypertension        Past Surgical History:   Past Surgical History:   Procedure Laterality Date    COLONOSCOPY N/A 1/5/2022    Procedure: COLONOSCOPY Golytely Vaccinated will bring cards;  Surgeon: Dereje Simon MD;  Location: [facility];  Service: Endoscopy;  Laterality: N/A;  Do not cancel this order       Family History:   Family History   Problem Relation Age of Onset    Hypertension Mother     Cancer Father     Cancer Brother     No Known Problems Daughter     No Known Problems Daughter     No Known Problems Son        Social History:  reports that he has been smoking cigarettes. He has been smoking an average of 1.5 packs per day. He has never used smokeless tobacco. He reports that he does not currently use alcohol. He reports that he does not use " drugs.    Allergies:  Review of patient's allergies indicates:  No Known Allergies    Medications:  Current Outpatient Medications   Medication Sig Dispense Refill    atorvastatin (LIPITOR) 80 MG tablet Take 1 tablet (80 mg total) by mouth once daily. 90 tablet 3    carvediloL (COREG) 6.25 MG tablet TAKE 1 TABLET(6.25 MG) BY MOUTH TWICE DAILY WITH MEALS 180 tablet 3    gabapentin (NEURONTIN) 300 MG capsule Take 1 capsule (300 mg total) by mouth 3 (three) times daily. 90 capsule 11    traZODone (DESYREL) 50 MG tablet Take 1 tablet (50 mg total) by mouth nightly as needed for Insomnia. 30 tablet 1     No current facility-administered medications for this visit.       Review of Systems   Constitutional:  Negative for fatigue.   HENT:  Negative for sore throat.    Eyes:  Negative for visual disturbance.   Respiratory:  Negative for shortness of breath.    Cardiovascular:  Negative for chest pain.   Gastrointestinal:  Negative for abdominal pain.   Genitourinary:  Negative for dysuria.   Musculoskeletal:  Negative for back pain.   Skin:  Negative for rash.   Neurological:  Negative for headaches.   Hematological:  Negative for adenopathy.   Psychiatric/Behavioral:  The patient is not nervous/anxious.      ECOG Performance Status:   ECOG SCORE    0 - Fully active-able to carry on all pre-disease performance without restriction         Objective:      Vitals:   Vitals:    04/13/23 1304   BP: (!) 106/59   Pulse: 83   SpO2: 100%   Weight: 69.7 kg (153 lb 10.6 oz)     BMI: Body mass index is 22.69 kg/m².    Physical Exam  Vitals and nursing note reviewed.   Constitutional:       Appearance: He is well-developed.   HENT:      Head: Normocephalic and atraumatic.   Eyes:      Pupils: Pupils are equal, round, and reactive to light.   Cardiovascular:      Rate and Rhythm: Normal rate and regular rhythm.   Pulmonary:      Effort: Pulmonary effort is normal.      Breath sounds: Normal breath sounds.   Abdominal:      General: Bowel  "sounds are normal.      Palpations: Abdomen is soft.   Musculoskeletal:         General: Normal range of motion.      Cervical back: Normal range of motion and neck supple.   Skin:     General: Skin is warm and dry.   Neurological:      Mental Status: He is alert and oriented to person, place, and time.   Psychiatric:         Behavior: Behavior normal.         Thought Content: Thought content normal.         Judgment: Judgment normal.       Laboratory Data:  Labs have been reviewed.    Lab Results   Component Value Date    WBC 7.41 08/27/2021    WBC 7.41 08/27/2021    WBC 7.41 08/27/2021    HGB 17.0 08/27/2021    HGB 17.0 08/27/2021    HGB 17.0 08/27/2021    HCT 51.5 08/27/2021    HCT 51.5 08/27/2021    HCT 51.5 08/27/2021    MCV 98 08/27/2021    MCV 98 08/27/2021    MCV 98 08/27/2021     08/27/2021     08/27/2021     08/27/2021           Imaging:        Assessment:       1. Anemia, unspecified type    2. Chronic kidney disease, stage 3a    3. Nutritional anemia, unspecified    4. Encounter for screening for malignant neoplasm of prostate    5. Advance care planning           Plan:     Anemia  [I do not see evidence of anemia in his labs, but he was referred for anemia workup.]  - he went to [place] for a dental procedure. He had several teeth removed ("an intense procedure per his wife"). He had taken antibiotics/amoxicillin and ibuprofen. He had the second part of procedure about a week later. He noted severe fatigue, weakness. Labs revealed a hemoglobin of 6.8 g/dL. Follow up labs revealed a decrease to 6.2 g/dL. He received 2 units of blood last week.  - I have ordered an extensive laboratory workup  - I will contact him with results of testing. If needed, I will schedule a follow-up appointment.    2. Chronic kidney disease stage 3a  - last eGFR was 57 ml/min/m2  - defer management to nephrology    3. Advance Care Planning     Date: 04/13/2023    Power of   I initiated the process " of advance care planning today and explained the importance of this process to the patient.  I introduced the concept of advance directives to the patient, as well. Then the patient received detailed information about the importance of designating a Health Care Power of  (HCPOA). He was also instructed to communicate with this person about their wishes for future healthcare, should he become sick and lose decision-making capacity. The patient has not previously appointed a HCPOA. After our discussion, the patient has decided not to complete a HCPOA.          - I will contact him with results of testing. If needed, I will schedule a follow-up appointment.      Harry Diamond M.D.  Hematology/Oncology  [facility] - [address], [address]  [place] [address]  Phone: [phone]  Fax: [phone]

## 2023-04-13 ENCOUNTER — OFFICE VISIT (OUTPATIENT)
Dept: HEMATOLOGY/ONCOLOGY | Facility: CLINIC | Age: 68
End: 2023-04-13
Payer: MEDICARE

## 2023-04-13 ENCOUNTER — LAB VISIT (OUTPATIENT)
Dept: LAB | Facility: HOSPITAL | Age: 68
End: 2023-04-13
Payer: MEDICARE

## 2023-04-13 VITALS
BODY MASS INDEX: 22.69 KG/M2 | DIASTOLIC BLOOD PRESSURE: 59 MMHG | HEART RATE: 83 BPM | OXYGEN SATURATION: 100 % | SYSTOLIC BLOOD PRESSURE: 106 MMHG | WEIGHT: 153.69 LBS

## 2023-04-13 DIAGNOSIS — Z12.5 ENCOUNTER FOR SCREENING FOR MALIGNANT NEOPLASM OF PROSTATE: ICD-10-CM

## 2023-04-13 DIAGNOSIS — Z71.89 ADVANCE CARE PLANNING: ICD-10-CM

## 2023-04-13 DIAGNOSIS — D64.9 ANEMIA, UNSPECIFIED TYPE: Primary | ICD-10-CM

## 2023-04-13 DIAGNOSIS — D53.9 NUTRITIONAL ANEMIA, UNSPECIFIED: ICD-10-CM

## 2023-04-13 DIAGNOSIS — N18.31 CHRONIC KIDNEY DISEASE, STAGE 3A: ICD-10-CM

## 2023-04-13 DIAGNOSIS — D64.9 SYMPTOMATIC ANEMIA: ICD-10-CM

## 2023-04-13 LAB
ABO + RH BLD: NORMAL
ALBUMIN SERPL BCP-MCNC: 3.9 G/DL (ref 3.5–5.2)
ALP SERPL-CCNC: 63 U/L (ref 55–135)
ALT SERPL W/O P-5'-P-CCNC: 22 U/L (ref 10–44)
ANION GAP SERPL CALC-SCNC: 7 MMOL/L (ref 8–16)
ANISOCYTOSIS BLD QL SMEAR: SLIGHT
AST SERPL-CCNC: 19 U/L (ref 10–40)
BASOPHILS # BLD AUTO: 0.02 K/UL (ref 0–0.2)
BASOPHILS NFR BLD: 0.7 % (ref 0–1.9)
BILIRUB SERPL-MCNC: 0.7 MG/DL (ref 0.1–1)
BLD GP AB SCN CELLS X3 SERPL QL: NORMAL
BUN SERPL-MCNC: 18 MG/DL (ref 8–23)
CALCIUM SERPL-MCNC: 9 MG/DL (ref 8.7–10.5)
CHLORIDE SERPL-SCNC: 108 MMOL/L (ref 95–110)
CO2 SERPL-SCNC: 27 MMOL/L (ref 23–29)
CREAT SERPL-MCNC: 1 MG/DL (ref 0.5–1.4)
CRP SERPL-MCNC: 3.9 MG/L (ref 0–8.2)
DAT IGG-SP REAG RBC-IMP: NORMAL
DIFFERENTIAL METHOD: ABNORMAL
EOSINOPHIL # BLD AUTO: 0 K/UL (ref 0–0.5)
EOSINOPHIL NFR BLD: 0.7 % (ref 0–8)
ERYTHROCYTE [DISTWIDTH] IN BLOOD BY AUTOMATED COUNT: 17.4 % (ref 11.5–14.5)
ERYTHROCYTE [SEDIMENTATION RATE] IN BLOOD BY PHOTOMETRIC METHOD: 22 MM/HR (ref 0–23)
EST. GFR  (NO RACE VARIABLE): >60 ML/MIN/1.73 M^2
FERRITIN SERPL-MCNC: 662 NG/ML (ref 20–300)
FOLATE SERPL-MCNC: 9.6 NG/ML (ref 4–24)
GLUCOSE SERPL-MCNC: 92 MG/DL (ref 70–110)
HCT VFR BLD AUTO: 21.9 % (ref 40–54)
HGB BLD-MCNC: 7.1 G/DL (ref 14–18)
HYPOCHROMIA BLD QL SMEAR: ABNORMAL
IMM GRANULOCYTES # BLD AUTO: 0.05 K/UL (ref 0–0.04)
IMM GRANULOCYTES NFR BLD AUTO: 1.8 % (ref 0–0.5)
IRON SERPL-MCNC: 215 UG/DL (ref 45–160)
LDH SERPL L TO P-CCNC: 137 U/L (ref 110–260)
LYMPHOCYTES # BLD AUTO: 1.9 K/UL (ref 1–4.8)
LYMPHOCYTES NFR BLD: 69 % (ref 18–48)
MCH RBC QN AUTO: 32.7 PG (ref 27–31)
MCHC RBC AUTO-ENTMCNC: 32.4 G/DL (ref 32–36)
MCV RBC AUTO: 101 FL (ref 82–98)
MONOCYTES # BLD AUTO: 0.1 K/UL (ref 0.3–1)
MONOCYTES NFR BLD: 3.3 % (ref 4–15)
NEUTROPHILS # BLD AUTO: 0.7 K/UL (ref 1.8–7.7)
NEUTROPHILS NFR BLD: 24.5 % (ref 38–73)
NRBC BLD-RTO: 0 /100 WBC
OVALOCYTES BLD QL SMEAR: ABNORMAL
PLATELET # BLD AUTO: 400 K/UL (ref 150–450)
PLATELET BLD QL SMEAR: ABNORMAL
PMV BLD AUTO: 10.9 FL (ref 9.2–12.9)
POIKILOCYTOSIS BLD QL SMEAR: SLIGHT
POLYCHROMASIA BLD QL SMEAR: ABNORMAL
POTASSIUM SERPL-SCNC: 4.7 MMOL/L (ref 3.5–5.1)
PROT SERPL-MCNC: 7 G/DL (ref 6–8.4)
RBC # BLD AUTO: 2.17 M/UL (ref 4.6–6.2)
RETICS/RBC NFR AUTO: 1 % (ref 0.4–2)
SATURATED IRON: 90 % (ref 20–50)
SODIUM SERPL-SCNC: 142 MMOL/L (ref 136–145)
SPECIMEN OUTDATE: NORMAL
TOTAL IRON BINDING CAPACITY: 240 UG/DL (ref 250–450)
TRANSFERRIN SERPL-MCNC: 162 MG/DL (ref 200–375)
URATE SERPL-MCNC: 5.2 MG/DL (ref 3.4–7)
VIT B12 SERPL-MCNC: 563 PG/ML (ref 210–950)
WBC # BLD AUTO: 2.71 K/UL (ref 3.9–12.7)

## 2023-04-13 PROCEDURE — 84165 PROTEIN E-PHORESIS SERUM: CPT | Mod: 26,,, | Performed by: PATHOLOGY

## 2023-04-13 PROCEDURE — 82728 ASSAY OF FERRITIN: CPT | Performed by: INTERNAL MEDICINE

## 2023-04-13 PROCEDURE — 84165 PROTEIN E-PHORESIS SERUM: CPT | Performed by: INTERNAL MEDICINE

## 2023-04-13 PROCEDURE — 80053 COMPREHEN METABOLIC PANEL: CPT | Performed by: INTERNAL MEDICINE

## 2023-04-13 PROCEDURE — 1158F PR ADVANCE CARE PLANNING DISCUSS DOCUMENTED IN MEDICAL RECORD: ICD-10-PCS | Mod: CPTII,S$GLB,, | Performed by: INTERNAL MEDICINE

## 2023-04-13 PROCEDURE — 83615 LACTATE (LD) (LDH) ENZYME: CPT | Performed by: INTERNAL MEDICINE

## 2023-04-13 PROCEDURE — 1101F PT FALLS ASSESS-DOCD LE1/YR: CPT | Mod: CPTII,S$GLB,, | Performed by: INTERNAL MEDICINE

## 2023-04-13 PROCEDURE — 86334 PATHOLOGIST INTERPRETATION IFE: ICD-10-PCS | Mod: 26,,, | Performed by: PATHOLOGY

## 2023-04-13 PROCEDURE — 1159F PR MEDICATION LIST DOCUMENTED IN MEDICAL RECORD: ICD-10-PCS | Mod: CPTII,S$GLB,, | Performed by: INTERNAL MEDICINE

## 2023-04-13 PROCEDURE — 99204 OFFICE O/P NEW MOD 45 MIN: CPT | Mod: S$GLB,,, | Performed by: INTERNAL MEDICINE

## 2023-04-13 PROCEDURE — 85045 AUTOMATED RETICULOCYTE COUNT: CPT | Performed by: INTERNAL MEDICINE

## 2023-04-13 PROCEDURE — 3074F PR MOST RECENT SYSTOLIC BLOOD PRESSURE < 130 MM HG: ICD-10-PCS | Mod: CPTII,S$GLB,, | Performed by: INTERNAL MEDICINE

## 2023-04-13 PROCEDURE — 85652 RBC SED RATE AUTOMATED: CPT | Performed by: INTERNAL MEDICINE

## 2023-04-13 PROCEDURE — 99999 PR PBB SHADOW E&M-EST. PATIENT-LVL III: ICD-10-PCS | Mod: PBBFAC,,, | Performed by: INTERNAL MEDICINE

## 2023-04-13 PROCEDURE — 99999 PR PBB SHADOW E&M-EST. PATIENT-LVL III: CPT | Mod: PBBFAC,,, | Performed by: INTERNAL MEDICINE

## 2023-04-13 PROCEDURE — 86880 COOMBS TEST DIRECT: CPT | Performed by: INTERNAL MEDICINE

## 2023-04-13 PROCEDURE — 82607 VITAMIN B-12: CPT | Performed by: INTERNAL MEDICINE

## 2023-04-13 PROCEDURE — 84165 PATHOLOGIST INTERPRETATION SPE: ICD-10-PCS | Mod: 26,,, | Performed by: PATHOLOGY

## 2023-04-13 PROCEDURE — 1126F AMNT PAIN NOTED NONE PRSNT: CPT | Mod: CPTII,S$GLB,, | Performed by: INTERNAL MEDICINE

## 2023-04-13 PROCEDURE — 3074F SYST BP LT 130 MM HG: CPT | Mod: CPTII,S$GLB,, | Performed by: INTERNAL MEDICINE

## 2023-04-13 PROCEDURE — 85025 COMPLETE CBC W/AUTO DIFF WBC: CPT | Performed by: INTERNAL MEDICINE

## 2023-04-13 PROCEDURE — 3008F PR BODY MASS INDEX (BMI) DOCUMENTED: ICD-10-PCS | Mod: CPTII,S$GLB,, | Performed by: INTERNAL MEDICINE

## 2023-04-13 PROCEDURE — 83521 IG LIGHT CHAINS FREE EACH: CPT | Performed by: INTERNAL MEDICINE

## 2023-04-13 PROCEDURE — 3288F FALL RISK ASSESSMENT DOCD: CPT | Mod: CPTII,S$GLB,, | Performed by: INTERNAL MEDICINE

## 2023-04-13 PROCEDURE — 99204 PR OFFICE/OUTPT VISIT, NEW, LEVL IV, 45-59 MIN: ICD-10-PCS | Mod: S$GLB,,, | Performed by: INTERNAL MEDICINE

## 2023-04-13 PROCEDURE — 86334 IMMUNOFIX E-PHORESIS SERUM: CPT | Performed by: INTERNAL MEDICINE

## 2023-04-13 PROCEDURE — 1158F ADVNC CARE PLAN TLK DOCD: CPT | Mod: CPTII,S$GLB,, | Performed by: INTERNAL MEDICINE

## 2023-04-13 PROCEDURE — 1126F PR PAIN SEVERITY QUANTIFIED, NO PAIN PRESENT: ICD-10-PCS | Mod: CPTII,S$GLB,, | Performed by: INTERNAL MEDICINE

## 2023-04-13 PROCEDURE — 86140 C-REACTIVE PROTEIN: CPT | Performed by: INTERNAL MEDICINE

## 2023-04-13 PROCEDURE — 1159F MED LIST DOCD IN RCRD: CPT | Mod: CPTII,S$GLB,, | Performed by: INTERNAL MEDICINE

## 2023-04-13 PROCEDURE — 3288F PR FALLS RISK ASSESSMENT DOCUMENTED: ICD-10-PCS | Mod: CPTII,S$GLB,, | Performed by: INTERNAL MEDICINE

## 2023-04-13 PROCEDURE — 3078F PR MOST RECENT DIASTOLIC BLOOD PRESSURE < 80 MM HG: ICD-10-PCS | Mod: CPTII,S$GLB,, | Performed by: INTERNAL MEDICINE

## 2023-04-13 PROCEDURE — 84466 ASSAY OF TRANSFERRIN: CPT | Performed by: INTERNAL MEDICINE

## 2023-04-13 PROCEDURE — 86334 IMMUNOFIX E-PHORESIS SERUM: CPT | Mod: 26,,, | Performed by: PATHOLOGY

## 2023-04-13 PROCEDURE — 84550 ASSAY OF BLOOD/URIC ACID: CPT | Performed by: INTERNAL MEDICINE

## 2023-04-13 PROCEDURE — 1101F PR PT FALLS ASSESS DOC 0-1 FALLS W/OUT INJ PAST YR: ICD-10-PCS | Mod: CPTII,S$GLB,, | Performed by: INTERNAL MEDICINE

## 2023-04-13 PROCEDURE — 3008F BODY MASS INDEX DOCD: CPT | Mod: CPTII,S$GLB,, | Performed by: INTERNAL MEDICINE

## 2023-04-13 PROCEDURE — 82746 ASSAY OF FOLIC ACID SERUM: CPT | Performed by: INTERNAL MEDICINE

## 2023-04-13 PROCEDURE — 3078F DIAST BP <80 MM HG: CPT | Mod: CPTII,S$GLB,, | Performed by: INTERNAL MEDICINE

## 2023-04-13 PROCEDURE — 86900 BLOOD TYPING SEROLOGIC ABO: CPT | Performed by: INTERNAL MEDICINE

## 2023-04-14 ENCOUNTER — HOSPITAL ENCOUNTER (OUTPATIENT)
Dept: CARDIOLOGY | Facility: HOSPITAL | Age: 68
Discharge: HOME | End: 2023-04-14
Attending: NURSE PRACTITIONER
Payer: MEDICARE

## 2023-04-14 DIAGNOSIS — I73.9 CLAUDICATION: ICD-10-CM

## 2023-04-14 LAB
ALBUMIN SERPL ELPH-MCNC: 3.88 G/DL (ref 3.35–5.55)
ALPHA1 GLOB SERPL ELPH-MCNC: 0.26 G/DL (ref 0.17–0.41)
ALPHA2 GLOB SERPL ELPH-MCNC: 0.48 G/DL (ref 0.43–0.99)
B-GLOBULIN SERPL ELPH-MCNC: 0.86 G/DL (ref 0.5–1.1)
GAMMA GLOB SERPL ELPH-MCNC: 1.21 G/DL (ref 0.67–1.58)
INTERPRETATION SERPL IFE-IMP: NORMAL
KAPPA LC SER QL IA: 3.89 MG/DL (ref 0.33–1.94)
KAPPA LC/LAMBDA SER IA: 1.4 (ref 0.26–1.65)
LAMBDA LC SER QL IA: 2.77 MG/DL (ref 0.57–2.63)
PROT SERPL-MCNC: 6.7 G/DL (ref 6–8.4)

## 2023-04-14 PROCEDURE — 93922 UPR/L XTREMITY ART 2 LEVELS: CPT | Mod: 26,,, | Performed by: INTERNAL MEDICINE

## 2023-04-14 PROCEDURE — 93925 LOWER EXTREMITY STUDY: CPT

## 2023-04-14 PROCEDURE — 93925 CV US DOPPLER ARTERIAL LEGS BILATERAL (CUPID ONLY): ICD-10-PCS | Mod: 26,,, | Performed by: INTERNAL MEDICINE

## 2023-04-14 PROCEDURE — 93925 LOWER EXTREMITY STUDY: CPT | Mod: 26,,, | Performed by: INTERNAL MEDICINE

## 2023-04-14 PROCEDURE — 93922 UPR/L XTREMITY ART 2 LEVELS: CPT

## 2023-04-14 PROCEDURE — 93922 ANKLE BRACHIAL INDICES (ABI): ICD-10-PCS | Mod: 26,,, | Performed by: INTERNAL MEDICINE

## 2023-04-17 ENCOUNTER — TELEPHONE (OUTPATIENT)
Dept: HEMATOLOGY/ONCOLOGY | Facility: CLINIC | Age: 68
End: 2023-04-17
Payer: MEDICARE

## 2023-04-17 ENCOUNTER — OFFICE VISIT (OUTPATIENT)
Dept: CARDIOLOGY | Facility: CLINIC | Age: 68
End: 2023-04-17
Payer: MEDICARE

## 2023-04-17 ENCOUNTER — LAB VISIT (OUTPATIENT)
Dept: LAB | Facility: HOSPITAL | Age: 68
End: 2023-04-17
Attending: INTERNAL MEDICINE
Payer: MEDICARE

## 2023-04-17 VITALS
HEIGHT: 69 IN | BODY MASS INDEX: 22.81 KG/M2 | SYSTOLIC BLOOD PRESSURE: 107 MMHG | HEART RATE: 67 BPM | WEIGHT: 154 LBS | DIASTOLIC BLOOD PRESSURE: 62 MMHG

## 2023-04-17 DIAGNOSIS — I70.0 AORTIC ATHEROSCLEROSIS: ICD-10-CM

## 2023-04-17 DIAGNOSIS — D64.9 SYMPTOMATIC ANEMIA: Primary | ICD-10-CM

## 2023-04-17 DIAGNOSIS — D64.9 SYMPTOMATIC ANEMIA: ICD-10-CM

## 2023-04-17 DIAGNOSIS — I70.213 ATHEROSCLEROSIS OF NATIVE ARTERY OF BOTH LOWER EXTREMITIES WITH INTERMITTENT CLAUDICATION: Primary | ICD-10-CM

## 2023-04-17 DIAGNOSIS — I10 HYPERTENSION, ESSENTIAL: ICD-10-CM

## 2023-04-17 DIAGNOSIS — Z87.891 PERSONAL HISTORY OF NICOTINE DEPENDENCE: ICD-10-CM

## 2023-04-17 DIAGNOSIS — R94.4 DECREASED GFR: ICD-10-CM

## 2023-04-17 DIAGNOSIS — I25.10 CORONARY ARTERY DISEASE INVOLVING NATIVE CORONARY ARTERY OF NATIVE HEART WITHOUT ANGINA PECTORIS: ICD-10-CM

## 2023-04-17 DIAGNOSIS — E78.2 MIXED HYPERLIPIDEMIA: ICD-10-CM

## 2023-04-17 LAB
ABO + RH BLD: NORMAL
BLD GP AB SCN CELLS X3 SERPL QL: NORMAL
LEFT ABI: 0.68
LEFT ANT TIBIAL SYS PSV: 22 CM/S
LEFT ARM BP: 100 MMHG
LEFT CFA PSV: 185 CM/S
LEFT DORSALIS PEDIS PSV: 15 CM/S
LEFT DORSALIS PEDIS: 73 MMHG
LEFT PERONEAL SYS PSV: 24 CM/S
LEFT POPLITEAL PSV: 85 CM/S
LEFT POST TIBIAL SYS PSV: 27 CM/S
LEFT POSTERIOR TIBIAL: 72 MMHG
LEFT PROFUNDA SYS PSV: 502 CM/S
LEFT SUPER FEMORAL DIST SYS PSV: 25 CM/S
LEFT SUPER FEMORAL MID SYS PSV: 0 CM/S
LEFT SUPER FEMORAL PROX SYS PSV: 100 CM/S
LEFT TBI: 0.41
LEFT TIB/PER TRUNK SYS PSV: 37 CM/S
LEFT TOE PRESSURE: 44 MMHG
PATHOLOGIST INTERPRETATION IFE: NORMAL
PATHOLOGIST INTERPRETATION SPE: NORMAL
RIGHT ABI: 1.08
RIGHT ANT TIBIAL SYS PSV: 63 CM/S
RIGHT ARM BP: 108 MMHG
RIGHT CFA PSV: 183 CM/S
RIGHT DORSALIS PEDIS PSV: 65 CM/S
RIGHT DORSALIS PEDIS: 110 MMHG
RIGHT PERONEAL SYS PSV: 43 CM/S
RIGHT POPLITEAL PSV: 60 CM/S
RIGHT POST TIBIAL SYS PSV: 60 CM/S
RIGHT POSTERIOR TIBIAL: 117 MMHG
RIGHT PROFUNDA SYS PSV: 162 CM/S
RIGHT SUPER FEMORAL DIST SYS PSV: 119 CM/S
RIGHT SUPER FEMORAL MID SYS PSV: 80 CM/S
RIGHT SUPER FEMORAL PROX SYS PSV: 117 CM/S
RIGHT TBI: 1
RIGHT TIB/PER TRUNK SYS PSV: 60 CM/S
RIGHT TOE PRESSURE: 108 MMHG
SPECIMEN OUTDATE: NORMAL

## 2023-04-17 PROCEDURE — 3074F PR MOST RECENT SYSTOLIC BLOOD PRESSURE < 130 MM HG: ICD-10-PCS | Mod: CPTII,S$GLB,, | Performed by: INTERNAL MEDICINE

## 2023-04-17 PROCEDURE — 1126F PR PAIN SEVERITY QUANTIFIED, NO PAIN PRESENT: ICD-10-PCS | Mod: CPTII,S$GLB,, | Performed by: INTERNAL MEDICINE

## 2023-04-17 PROCEDURE — 1159F PR MEDICATION LIST DOCUMENTED IN MEDICAL RECORD: ICD-10-PCS | Mod: CPTII,S$GLB,, | Performed by: INTERNAL MEDICINE

## 2023-04-17 PROCEDURE — 99999 PR PBB SHADOW E&M-EST. PATIENT-LVL III: CPT | Mod: PBBFAC,,, | Performed by: INTERNAL MEDICINE

## 2023-04-17 PROCEDURE — 86900 BLOOD TYPING SEROLOGIC ABO: CPT | Performed by: INTERNAL MEDICINE

## 2023-04-17 PROCEDURE — 86920 COMPATIBILITY TEST SPIN: CPT | Performed by: INTERNAL MEDICINE

## 2023-04-17 PROCEDURE — 36415 COLL VENOUS BLD VENIPUNCTURE: CPT | Performed by: INTERNAL MEDICINE

## 2023-04-17 PROCEDURE — 3078F DIAST BP <80 MM HG: CPT | Mod: CPTII,S$GLB,, | Performed by: INTERNAL MEDICINE

## 2023-04-17 PROCEDURE — 3078F PR MOST RECENT DIASTOLIC BLOOD PRESSURE < 80 MM HG: ICD-10-PCS | Mod: CPTII,S$GLB,, | Performed by: INTERNAL MEDICINE

## 2023-04-17 PROCEDURE — 99999 PR PBB SHADOW E&M-EST. PATIENT-LVL III: ICD-10-PCS | Mod: PBBFAC,,, | Performed by: INTERNAL MEDICINE

## 2023-04-17 PROCEDURE — 99214 PR OFFICE/OUTPT VISIT, EST, LEVL IV, 30-39 MIN: ICD-10-PCS | Mod: S$GLB,,, | Performed by: INTERNAL MEDICINE

## 2023-04-17 PROCEDURE — 3074F SYST BP LT 130 MM HG: CPT | Mod: CPTII,S$GLB,, | Performed by: INTERNAL MEDICINE

## 2023-04-17 PROCEDURE — 99214 OFFICE O/P EST MOD 30 MIN: CPT | Mod: S$GLB,,, | Performed by: INTERNAL MEDICINE

## 2023-04-17 PROCEDURE — 3008F BODY MASS INDEX DOCD: CPT | Mod: CPTII,S$GLB,, | Performed by: INTERNAL MEDICINE

## 2023-04-17 PROCEDURE — 1159F MED LIST DOCD IN RCRD: CPT | Mod: CPTII,S$GLB,, | Performed by: INTERNAL MEDICINE

## 2023-04-17 PROCEDURE — 1126F AMNT PAIN NOTED NONE PRSNT: CPT | Mod: CPTII,S$GLB,, | Performed by: INTERNAL MEDICINE

## 2023-04-17 PROCEDURE — 3008F PR BODY MASS INDEX (BMI) DOCUMENTED: ICD-10-PCS | Mod: CPTII,S$GLB,, | Performed by: INTERNAL MEDICINE

## 2023-04-17 RX ORDER — ZOLPIDEM TARTRATE 10 MG/1
10 TABLET ORAL NIGHTLY PRN
COMMUNITY
End: 2024-01-19 | Stop reason: SDUPTHER

## 2023-04-17 RX ORDER — ASPIRIN 81 MG/1
81 TABLET ORAL DAILY
Qty: 30 TABLET | Refills: 12
Start: 2023-04-17

## 2023-04-17 RX ORDER — CILOSTAZOL 50 MG/1
50 TABLET ORAL 2 TIMES DAILY
Qty: 180 TABLET | Refills: 3 | Status: SHIPPED | OUTPATIENT
Start: 2023-04-17 | End: 2024-04-16

## 2023-04-17 RX ORDER — ACETAMINOPHEN 325 MG/1
650 TABLET ORAL
Status: CANCELLED | OUTPATIENT
Start: 2023-04-17

## 2023-04-17 RX ORDER — HYDROCODONE BITARTRATE AND ACETAMINOPHEN 500; 5 MG/1; MG/1
TABLET ORAL ONCE
Status: CANCELLED | OUTPATIENT
Start: 2023-04-17 | End: 2023-04-17

## 2023-04-17 RX ORDER — DIPHENHYDRAMINE HCL 25 MG
25 CAPSULE ORAL
Status: CANCELLED | OUTPATIENT
Start: 2023-04-17

## 2023-04-17 NOTE — PROGRESS NOTES
Subjective:   Patient ID:  Guillaume Salinas is a 67 y.o. male who presents for management of Peripheral Arterial Disease, Claudication, Hyperlipidemia, and Hypertension      HPI:     He is here by recommendation of his care team for management of Enzo IIb claudication left worse than right.  No rest pain.  No ulceration.  No previous revascularization.  He has a history of tobacco use.  He is ANGIE on the right is 0.08.  Left 0.68.  His arterial ultrasound is suggestive of left common or external iliac stenosis in addition to left SFA  with moderate disease of the right SFA/popliteal.      ANGIE 2023    Right ANGIE 1.08  Left ANGIE 0.68 Suggest moderate PAD on the left side       US 2023    Monophasic waveform in the left lower extremity suggest inflow disease in the common iliac area  Occluded left mid SFA and reconstitute distal from profunda collaterals  Left profunda proximal has significantly hemodynamic stenosis more than 70%  No significant stenosis noted on the right side       Patient Active Problem List    Diagnosis Date Noted    Atherosclerosis of native artery of both lower extremities with intermittent claudication 2023    Abnormal sensation of leg 2022    Aortic atherosclerosis 2022    Decreased GFR 2022    Colon polyps 2022    Hypertension, essential 2021    Iliac artery stenosis, right 2021     10/29/2021 [facility]        Coronary artery disease involving native coronary artery of native heart without angina pectoris 2021    Chest pain 2021    Personal history of nicotine dependence 2021    Hyperlipidemia 10/20/2021           Right Arm BP - Sittin/57  Left Arm BP - Sittin/62        LABS      LAST HbA1c  Lab Results   Component Value Date    HGBA1C 5.2 10/19/2021       Lipid panel  Lab Results   Component Value Date    CHOL 120 2023    CHOL 119 (L) 2022    CHOL 229 (H) 10/19/2021     Lab Results   Component  Value Date    HDL 25 (L) 04/17/2023    HDL 29 (L) 02/22/2022    HDL 32 (L) 10/19/2021     Lab Results   Component Value Date    LDLCALC 71.4 04/17/2023    LDLCALC 64.4 02/22/2022    LDLCALC 157.4 10/19/2021     Lab Results   Component Value Date    TRIG 118 04/17/2023    TRIG 128 02/22/2022    TRIG 198 (H) 10/19/2021     Lab Results   Component Value Date    CHOLHDL 20.8 04/17/2023    CHOLHDL 24.4 02/22/2022    CHOLHDL 14.0 (L) 10/19/2021            Review of Systems   Constitutional: Negative for diaphoresis, night sweats, weight gain and weight loss.   HENT:  Negative for congestion.    Eyes:  Negative for blurred vision, discharge and double vision.   Cardiovascular:  Positive for claudication. Negative for chest pain, cyanosis, dyspnea on exertion, irregular heartbeat, leg swelling, near-syncope, orthopnea, palpitations, paroxysmal nocturnal dyspnea and syncope.   Respiratory:  Negative for cough, shortness of breath and wheezing.    Endocrine: Negative for cold intolerance, heat intolerance and polyphagia.   Hematologic/Lymphatic: Negative for adenopathy and bleeding problem. Does not bruise/bleed easily.   Skin:  Negative for dry skin and nail changes.   Musculoskeletal:  Negative for arthritis, back pain, falls, joint pain, myalgias and neck pain.   Gastrointestinal:  Negative for bloating, abdominal pain, change in bowel habit and constipation.   Genitourinary:  Negative for bladder incontinence, dysuria, flank pain, genital sores and missed menses.   Neurological:  Negative for aphonia, brief paralysis, difficulty with concentration, dizziness and weakness.   Psychiatric/Behavioral:  Negative for altered mental status and memory loss. The patient does not have insomnia.    Allergic/Immunologic: Negative for environmental allergies.     Objective:   Physical Exam  Constitutional:       Appearance: He is well-developed.      Interventions: He is not intubated.  HENT:      Head: Normocephalic and atraumatic.       Right Ear: External ear normal.      Left Ear: External ear normal.   Eyes:      General: No scleral icterus.        Right eye: No discharge.         Left eye: No discharge.      Conjunctiva/sclera: Conjunctivae normal.      Pupils: Pupils are equal, round, and reactive to light.   Neck:      Thyroid: No thyromegaly.      Vascular: Normal carotid pulses. No carotid bruit, hepatojugular reflux or JVD.      Trachea: No tracheal deviation.   Cardiovascular:      Rate and Rhythm: Normal rate and regular rhythm. No extrasystoles are present.     Chest Wall: PMI is not displaced.      Pulses:           Carotid pulses are 2+ on the right side and 2+ on the left side.       Radial pulses are 2+ on the right side and 2+ on the left side.        Femoral pulses are 2+ on the right side and 1+ on the left side.       Popliteal pulses are 0 on the right side and 0 on the left side.        Dorsalis pedis pulses are 0 on the right side and 0 on the left side.        Posterior tibial pulses are 0 on the right side and 0 on the left side.      Heart sounds: S1 normal and S2 normal. Heart sounds not distant. No midsystolic click. No murmur heard.    No friction rub. No gallop. No S3 sounds.      Comments:     Faint L PT and DP doppler signals       Monophasic R PT and biphasic R DP doppler signals       Pulmonary:      Effort: Pulmonary effort is normal. No tachypnea, bradypnea, accessory muscle usage or respiratory distress. He is not intubated.      Breath sounds: Normal breath sounds. No stridor. No decreased breath sounds, wheezing or rales.   Chest:      Chest wall: No tenderness.   Abdominal:      General: There is no distension or abdominal bruit.      Palpations: There is no mass or pulsatile mass.      Tenderness: There is no abdominal tenderness. There is no guarding or rebound.   Musculoskeletal:         General: No tenderness. Normal range of motion.      Cervical back: Normal range of motion and neck supple.    Lymphadenopathy:      Cervical: No cervical adenopathy.   Skin:     General: Skin is warm.      Coloration: Skin is not pale.      Findings: No erythema or rash.   Neurological:      Mental Status: He is alert and oriented to person, place, and time.      Cranial Nerves: No cranial nerve deficit.      Coordination: Coordination normal.      Deep Tendon Reflexes: Reflexes are normal and symmetric.   Psychiatric:         Behavior: Behavior normal.         Thought Content: Thought content normal.         Judgment: Judgment normal.       Assessment:     1. Atherosclerosis of native artery of both lower extremities with intermittent claudication    2. Mixed hyperlipidemia    3. Aortic atherosclerosis    4. Hypertension, essential    5. Personal history of nicotine dependence    6. Decreased GFR    7. Coronary artery disease involving native coronary artery of native heart without angina pectoris        Plan:       We had a long discussion regarding pathophysiology and progression of atherosclerosis and claudication.  We will start Pletal 50 mg twice daily plan of titrated to 100 mg twice daily.  Aspirin 81 mg daily.  Intense statin therapy with LDL goal less than 70.  Initiate supervised exercise with peripheral tear disease rehabilitation.  Follow up in 3 months.  Proper foot care.  Smoking cessation was discussed at length with the patient.  Smoking cessation referral was placed.  Patient and wife expressed verbal understanding of the plan.  All the questions were answered to his satisfaction during the visit.        Continue with current medical plan and lifestyle changes.  Return sooner for concerns or questions. If symptoms persist go to the ED  I have reviewed all pertinent data on this patient       I have reviewed the patient's medical history in detail and updated the computerized patient record.    Orders Placed This Encounter   Procedures    Ambulatory referral/consult to Smoking Cessation Program      Standing Status:   Future     Standing Expiration Date:   5/18/2024     Referral Priority:   Routine     Referral Type:   Consultation     Referral Reason:   Specialty Services Required     Requested Specialty:   CTTS     Number of Visits Requested:   1    Peripheral Vascular Rehabilitation     Standing Status:   Future     Standing Expiration Date:   4/17/2024     Order Specific Question:   Department     Answer:   [facility] CARDIAC [facility]     Order Specific Question:   Select qualifying diagnosis:     Answer:   I70.213 - Atherosclerosis of native arteries of extremities with intermittent claudication, bilateral legs       Follow up as scheduled. Return sooner for concerns or questions            He expressed verbal understanding and agreed with the plan        -In today's visit, at least 4 established conditions that pose a risk to life or bodily function have been addressed and the conditions are severe.    -In today's visit, monitoring for drug toxicity was accomplished.      Patient's Medications   New Prescriptions    ASPIRIN (ECOTRIN) 81 MG EC TABLET    Take 1 tablet (81 mg total) by mouth once daily.    CILOSTAZOL (PLETAL) 50 MG TAB    Take 1 tablet (50 mg total) by mouth 2 (two) times daily.   Previous Medications    ATORVASTATIN (LIPITOR) 80 MG TABLET    Take 1 tablet (80 mg total) by mouth once daily.    CARVEDILOL (COREG) 6.25 MG TABLET    TAKE 1 TABLET(6.25 MG) BY MOUTH TWICE DAILY WITH MEALS    GABAPENTIN (NEURONTIN) 300 MG CAPSULE    Take 1 capsule (300 mg total) by mouth 3 (three) times daily.    ZOLPIDEM (AMBIEN) 10 MG TAB    Take 10 mg by mouth nightly as needed.   Modified Medications    No medications on file   Discontinued Medications    TRAZODONE (DESYREL) 50 MG TABLET    Take 1 tablet (50 mg total) by mouth nightly as needed for Insomnia.

## 2023-04-17 NOTE — TELEPHONE ENCOUNTER
I called and reviewed his labs. Unfortunately, his hemoglobin is decreased at 7.1 g/dL. He is scheduled to get labs today. If his hemoglobin has not improved/increased, I will recommend a bone marrow biopsy for further evaluation.    Harry Diamond M.D.  Hematology/Oncology  [facility] - [address], [address]  [place] [address]  Phone: [phone]  Fax: [phone]

## 2023-04-18 ENCOUNTER — TELEPHONE (OUTPATIENT)
Dept: INFUSION THERAPY | Facility: HOSPITAL | Age: 68
End: 2023-04-18
Payer: MEDICARE

## 2023-04-18 PROBLEM — I70.213 ATHEROSCLEROSIS OF NATIVE ARTERY OF BOTH LOWER EXTREMITIES WITH INTERMITTENT CLAUDICATION: Status: ACTIVE | Noted: 2023-04-18

## 2023-04-18 NOTE — TELEPHONE ENCOUNTER
Using language line ([employer] [id number]) confirmed transfusion appt on 4/20 at 9am. Reviewed pre infusion instructions with the patient. Provided the patient with address and phone number to infusion center

## 2023-04-19 ENCOUNTER — INFUSION (OUTPATIENT)
Dept: INFUSION THERAPY | Facility: HOSPITAL | Age: 68
End: 2023-04-19
Attending: INTERNAL MEDICINE
Payer: MEDICARE

## 2023-04-19 ENCOUNTER — TELEPHONE (OUTPATIENT)
Dept: INFUSION THERAPY | Facility: HOSPITAL | Age: 68
End: 2023-04-19
Payer: MEDICARE

## 2023-04-19 VITALS
WEIGHT: 154 LBS | BODY MASS INDEX: 22.81 KG/M2 | OXYGEN SATURATION: 100 % | SYSTOLIC BLOOD PRESSURE: 115 MMHG | DIASTOLIC BLOOD PRESSURE: 57 MMHG | HEART RATE: 10 BPM | RESPIRATION RATE: 18 BRPM | HEIGHT: 69 IN | TEMPERATURE: 98 F

## 2023-04-19 DIAGNOSIS — D64.9 SYMPTOMATIC ANEMIA: ICD-10-CM

## 2023-04-19 LAB
BLD PROD TYP BPU: NORMAL
BLOOD UNIT EXPIRATION DATE: NORMAL
BLOOD UNIT TYPE CODE: 7300
BLOOD UNIT TYPE: NORMAL
CODING SYSTEM: NORMAL
CROSSMATCH INTERPRETATION: NORMAL
DISPENSE STATUS: NORMAL
NUM UNITS TRANS PACKED RBC: NORMAL

## 2023-04-19 PROCEDURE — 25000003 PHARM REV CODE 250: Performed by: INTERNAL MEDICINE

## 2023-04-19 PROCEDURE — P9016 RBC LEUKOCYTES REDUCED: HCPCS | Performed by: INTERNAL MEDICINE

## 2023-04-19 PROCEDURE — 36430 TRANSFUSION BLD/BLD COMPNT: CPT

## 2023-04-19 RX ORDER — HYDROCODONE BITARTRATE AND ACETAMINOPHEN 500; 5 MG/1; MG/1
TABLET ORAL ONCE
Status: COMPLETED | OUTPATIENT
Start: 2023-04-19 | End: 2023-04-19

## 2023-04-19 RX ORDER — ACETAMINOPHEN 325 MG/1
650 TABLET ORAL
Status: DISCONTINUED | OUTPATIENT
Start: 2023-04-19 | End: 2023-04-19 | Stop reason: HOSPADM

## 2023-04-19 RX ORDER — DIPHENHYDRAMINE HCL 25 MG
25 CAPSULE ORAL
Status: DISCONTINUED | OUTPATIENT
Start: 2023-04-19 | End: 2023-04-19 | Stop reason: HOSPADM

## 2023-04-19 RX ADMIN — SODIUM CHLORIDE: 9 INJECTION, SOLUTION INTRAVENOUS at 11:04

## 2023-04-19 NOTE — TELEPHONE ENCOUNTER
Using language line ([employer] [id number]) called the patient to reschedule blood transfusion to today 4/19 at 1030a

## 2023-04-19 NOTE — NURSING
Pt tolerated 1 Unit PRBC infusion well.  No adverse reaction noted.   IV flushed with NS and D/C per protocol.  Patient left clinic in no acute distress.

## 2023-04-26 ENCOUNTER — HOSPITAL ENCOUNTER (OUTPATIENT)
Facility: HOSPITAL | Age: 68
Discharge: HOME | End: 2023-04-26
Attending: INTERNAL MEDICINE | Admitting: INTERNAL MEDICINE
Payer: MEDICARE

## 2023-04-26 VITALS
WEIGHT: 159 LBS | SYSTOLIC BLOOD PRESSURE: 108 MMHG | HEART RATE: 72 BPM | BODY MASS INDEX: 23.55 KG/M2 | DIASTOLIC BLOOD PRESSURE: 65 MMHG | TEMPERATURE: 98 F | OXYGEN SATURATION: 98 % | RESPIRATION RATE: 20 BRPM | HEIGHT: 69 IN

## 2023-04-26 DIAGNOSIS — D53.9 MACROCYTIC ANEMIA: Primary | ICD-10-CM

## 2023-04-26 PROCEDURE — 88313 SPECIAL STAINS GROUP 2: CPT | Mod: 59 | Performed by: PATHOLOGY

## 2023-04-26 PROCEDURE — 38222 DX BONE MARROW BX & ASPIR: CPT | Mod: LT,,, | Performed by: INTERNAL MEDICINE

## 2023-04-26 PROCEDURE — 85097 PR  BONE MARROW,SMEAR INTERPRETATION: ICD-10-PCS | Mod: ,,, | Performed by: PATHOLOGY

## 2023-04-26 PROCEDURE — 88311 PR  DECALCIFY TISSUE: ICD-10-PCS | Mod: 26,,, | Performed by: PATHOLOGY

## 2023-04-26 PROCEDURE — 88305 TISSUE EXAM BY PATHOLOGIST: CPT | Mod: 26,,, | Performed by: PATHOLOGY

## 2023-04-26 PROCEDURE — 88305 TISSUE EXAM BY PATHOLOGIST: CPT | Performed by: PATHOLOGY

## 2023-04-26 PROCEDURE — 88341 IMHCHEM/IMCYTCHM EA ADD ANTB: CPT | Mod: 26,,, | Performed by: PATHOLOGY

## 2023-04-26 PROCEDURE — 88342 IMHCHEM/IMCYTCHM 1ST ANTB: CPT | Mod: 59 | Performed by: PATHOLOGY

## 2023-04-26 PROCEDURE — 88311 DECALCIFY TISSUE: CPT | Mod: 26,,, | Performed by: PATHOLOGY

## 2023-04-26 PROCEDURE — 38221 DX BONE MARROW BIOPSIES: CPT | Performed by: INTERNAL MEDICINE

## 2023-04-26 PROCEDURE — 88342 IMHCHEM/IMCYTCHM 1ST ANTB: CPT | Mod: 26,59,, | Performed by: PATHOLOGY

## 2023-04-26 PROCEDURE — 88305 TISSUE EXAM BY PATHOLOGIST: ICD-10-PCS | Mod: 26,,, | Performed by: PATHOLOGY

## 2023-04-26 PROCEDURE — 88342 CHG IMMUNOCYTOCHEMISTRY: ICD-10-PCS | Mod: 26,59,, | Performed by: PATHOLOGY

## 2023-04-26 PROCEDURE — 88311 DECALCIFY TISSUE: CPT | Performed by: PATHOLOGY

## 2023-04-26 PROCEDURE — 88189 PR  FLOWCYTOMETRY/READ, 16 & > MARKERS: ICD-10-PCS | Mod: ,,, | Performed by: PATHOLOGY

## 2023-04-26 PROCEDURE — 63600175 PHARM REV CODE 636 W HCPCS: Performed by: INTERNAL MEDICINE

## 2023-04-26 PROCEDURE — 88185 FLOWCYTOMETRY/TC ADD-ON: CPT | Mod: 59 | Performed by: PATHOLOGY

## 2023-04-26 PROCEDURE — 88341 IMHCHEM/IMCYTCHM EA ADD ANTB: CPT | Mod: 59 | Performed by: PATHOLOGY

## 2023-04-26 PROCEDURE — 88299 UNLISTED CYTOGENETIC STUDY: CPT | Performed by: INTERNAL MEDICINE

## 2023-04-26 PROCEDURE — 38222 BONE MARROW: ICD-10-PCS | Mod: LT,,, | Performed by: INTERNAL MEDICINE

## 2023-04-26 PROCEDURE — 88313 PR  SPECIAL STAINS,GROUP II: ICD-10-PCS | Mod: 26,,, | Performed by: PATHOLOGY

## 2023-04-26 PROCEDURE — 88313 SPECIAL STAINS GROUP 2: CPT | Mod: 26,,, | Performed by: PATHOLOGY

## 2023-04-26 PROCEDURE — 88189 FLOWCYTOMETRY/READ 16 & >: CPT | Mod: ,,, | Performed by: PATHOLOGY

## 2023-04-26 PROCEDURE — 85097 BONE MARROW INTERPRETATION: CPT | Mod: ,,, | Performed by: PATHOLOGY

## 2023-04-26 PROCEDURE — 88184 FLOWCYTOMETRY/ TC 1 MARKER: CPT | Performed by: PATHOLOGY

## 2023-04-26 PROCEDURE — 88264 CHROMOSOME ANALYSIS 20-25: CPT | Performed by: INTERNAL MEDICINE

## 2023-04-26 PROCEDURE — 88341 PR IHC OR ICC EACH ADD'L SINGLE ANTIBODY  STAINPR: ICD-10-PCS | Mod: 26,,, | Performed by: PATHOLOGY

## 2023-04-26 PROCEDURE — 88237 TISSUE CULTURE BONE MARROW: CPT | Performed by: INTERNAL MEDICINE

## 2023-04-26 PROCEDURE — 38222 DX BONE MARROW BX & ASPIR: CPT | Mod: LT | Performed by: INTERNAL MEDICINE

## 2023-04-26 RX ORDER — FENTANYL CITRATE 50 UG/ML
100 INJECTION, SOLUTION INTRAMUSCULAR; INTRAVENOUS
Status: DISCONTINUED | OUTPATIENT
Start: 2023-04-26 | End: 2023-04-26 | Stop reason: HOSPADM

## 2023-04-26 RX ORDER — LIDOCAINE HYDROCHLORIDE 10 MG/ML
1 INJECTION INFILTRATION; PERINEURAL
Status: DISCONTINUED | OUTPATIENT
Start: 2023-04-26 | End: 2023-04-26 | Stop reason: HOSPADM

## 2023-04-26 RX ORDER — MIDAZOLAM HYDROCHLORIDE 5 MG/ML
5 INJECTION INTRAMUSCULAR; INTRAVENOUS
Status: DISCONTINUED | OUTPATIENT
Start: 2023-04-26 | End: 2023-04-26 | Stop reason: HOSPADM

## 2023-04-26 RX ADMIN — FENTANYL CITRATE 50 MCG: 50 INJECTION INTRAMUSCULAR; INTRAVENOUS at 08:04

## 2023-04-26 RX ADMIN — MIDAZOLAM HYDROCHLORIDE 2 MG: 5 INJECTION, SOLUTION INTRAMUSCULAR; INTRAVENOUS at 08:04

## 2023-04-26 RX ADMIN — MIDAZOLAM HYDROCHLORIDE 3 MG: 5 INJECTION, SOLUTION INTRAMUSCULAR; INTRAVENOUS at 08:04

## 2023-04-26 NOTE — DISCHARGE INSTRUCTIONS
Dieta:Resuma santiago dieta normal, a menos que tenga nausea : tome bastante liquidos y coma comida blandas.    Actividades:  Si usted recibio sedantes o anestesia, usted se sentira con sueno por varias horas.Gradualmente resuma lopez actividades normales.    Medicinas: Medicamento para dolor tomeselo cuando lo necesite y a timur se lo recetaron. Si le recetaron antibioticos,tomese todo el medicamento.    No maneje santiago automovil, tome productos alcoholicos,o firme documentos legales por las siguiente 24 horas o mientra riri tomando medicamento para dolor.    LLAME A SANTIAGO MEDICO:   Si comienza a sangrar ( CIERTA MARIIA SECA ES NORMAL).     Si la herida esta madelyn,inflamada,le empieza a supurar, o fiebre mas de101.  Si comienza a toser,escupir mariia,dolor de pecho. . Si la piernas de abajo de la rodillas le duelen, se sienten caliente, o se le inflaman .  Si tiene dolor o nausea/vomito persistente.    SI USTED TIENE CUALQUIER PROBLEMA O DIFFICULTADES MEDICA, POR FAVOR LLAME A SANTIAGO MEDICO. SI USTED NO SE COMUNICA CON SANTIAGO MEDICO DESIREE SIGE TENIENDO SIMPTOMAS QUE REQUERIEN ASSISTENCIA MEDICA, POR FAVOR REGRESE AL DEPARTAMENTO DE EMERGENCIA MAS CERCANO.  NO TOME ASPIRINA O PLETAL HASTA EL VIERNES 28 DE [address]. NO MAUDE ACTIVIDADES FISICAS X1 SEMANA. USE SANDRITA BOLSA DE HIELO EN LA ESPALDA EN EL SITIO DE LA BIOPSIA.

## 2023-04-26 NOTE — PROCEDURES
"Guillaume Salinas is a 67 y.o. male patient.    Temp: 98.2 °F (36.8 °C) (04/26/23 0745)  Pulse: 79 (04/26/23 0745)  Resp: 18 (04/26/23 0823)  BP: (!) 106/58 (04/26/23 0745)  SpO2: 98 % (04/26/23 0745)  Weight: 72.1 kg (159 lb) (04/26/23 0745)  Height: 5' 9" (175.3 cm) (04/26/23 0745)  Mallampati Scale: Class I  ASA Classification: Class 1    Bone marrow    Date/Time: 4/26/2023 8:53 AM  Performed by: Harry Diamond MD  Authorized by: Harry Diamond MD     Consent Done?: Yes (Written)   Immediately prior to procedure a time out was called to verify the correct patient, procedure, equipment, support staff and site/side marked as required. .    I was present for the entire procedure.    Position: right lateral  Anesthesia: local infiltration  Local anesthetic: lidocaine 1% without epinephrine  Aspiration?: Yes   Biopsy?: Yes    Specimen source: posterior iliac crest  Patient tolerated the procedure well with no immediate complications.  Post-operative instructions were provided for the patient.  Patient was discharged and will follow up if any complications occur.     Pre-Op Diagnosis: Macrocytic anemia    Post-Op Diagnosis: Macrocytic anemia    Estimated Blood Loss: 2 cc    Informed consent obtained from patient. Time-out done. Patient was then placed in right lateral position. Conscious sedation was administered by me using a combination of Versed and Fentanyl. The procedure started at approximately 8:15 a.m. and was completed by 8:48 a.m. The patient was monitored (heart rate, blood pressure, respirations, oxygen saturation, heart rhythm) throughout the procedure by a trained nurse.    Under strict aseptic precautions Left posterior iliac crest was prepped and draped in a sterile fashion. 1% lidocaine was used for infiltration of the periosteum. Using Jamshidi needle, under aseptic precautions bone marrow aspiration was performed. The needle was then removed. Hemostasis was achieved. Specimen was examined " and felt to be adequate. It was sent for appropriate studies.     Under aseptic precautions, the same Jamshidi needle was then advanced through the previous entrance site but the periosteum was entered at a slightly different location than the aspirate location. A core biopsy was obtained. The needle was then removed and hemostasis was achieved. Core biopsy specimen was examined and felt to be adequate. It was sent off for appropriate studies.     Patient tolerated the procedure well. No immediate post procedure complications were noted. The patient will continue to be monitored by a trained nurse until the effect of fentanyl and versed wears off and will be discharged appropriately.    Harry Diamond M.D.  Hematology/Oncology  [facility] - [address], [address]  [place] [address]  Phone: [phone]  Fax: [phone]      4/26/2023

## 2023-04-26 NOTE — DISCHARGE SUMMARY
"[facility]  Discharge Summary     Patient ID:  Guillaume Salinas  [id number]  67 y.o.  1955    Admit date: 4/26/2023    Discharge Date and Time: 4/26/23 at 9:00am    Admitting Physician: Harry Diamond MD     Discharge Provider: Harry Diamond    Reason for Admission: Symptomatic anemia [D64.9]  Macrocytic anemia [D53.9]    Admission Condition: good    Procedures Performed: Procedure(s) (LRB):  Biopsy-bone marrow (Left)    Hospital Course (synopsis of major diagnoses, care, treatment, and services provided during the course of the hospital stay): he was admitted for bone marrow biopsy. He tolerated procedure well. He was discharged home the same day.     Consults: None    Significant Diagnostic Studies: bone marrow studies are pending    Final Diagnoses:    Principal Problem: Macrocytic anemia   Secondary Diagnoses:   Active Hospital Problems    Diagnosis  POA    *Macrocytic anemia [D53.9]  Yes      Resolved Hospital Problems   No resolved problems to display.       Discharged Condition: good    Discharge Exam:  BP (!) 106/58 (BP Location: Right arm, Patient Position: Lying)   Pulse 79   Temp 98.2 °F (36.8 °C) (Skin)   Resp 18   Ht 5' 9" (1.753 m)   Wt 72.1 kg (159 lb)   SpO2 98%   BMI 23.48 kg/m²     General Appearance:    Alert, cooperative, no distress, appears stated age   Head:    Normocephalic, without obvious abnormality, atraumatic   Eyes:    PERRL, conjunctiva/corneas clear   Ears:    Normal and external ear canals, both ears   Nose:   Nares normal, mucosa normal   Throat:   Lips, mucosa, and tongue normal; teeth and gums normal   Neck:   Supple, symmetrical, trachea midline   Back:     Symmetric, no curvature, ROM normal, no CVA tenderness   Lungs:     Clear to auscultation bilaterally, respirations unlabored   Chest wall:    No tenderness or deformity   Heart:    Regular rate and rhythm   Abdomen:     Soft, non-tender, bowel sounds active           Extremities:   Extremities " normal, atraumatic, no cyanosis or edema   Pulses:   2+ and symmetric all extremities   Skin:   Skin color, texture, turgor normal, no rashes or lesions       Neurologic:   Normal strength, sensation     throughout       Disposition: Home or Self Care    Follow Up/Patient Instructions:     Medications:  Reconciled Home Medications:      Medication List        CONTINUE taking these medications      aspirin 81 MG EC tablet  Commonly known as: ECOTRIN  Take 1 tablet (81 mg total) by mouth once daily.     atorvastatin 80 MG tablet  Commonly known as: LIPITOR  Take 1 tablet (80 mg total) by mouth once daily.     carvediloL 6.25 MG tablet  Commonly known as: COREG  TAKE 1 TABLET(6.25 MG) BY MOUTH TWICE DAILY WITH MEALS     cilostazoL 50 MG Tab  Commonly known as: PLETAL  Take 1 tablet (50 mg total) by mouth 2 (two) times daily.     gabapentin 300 MG capsule  Commonly known as: NEURONTIN  Take 1 capsule (300 mg total) by mouth 3 (three) times daily.     zolpidem 10 mg Tab  Commonly known as: AMBIEN  Take 10 mg by mouth nightly as needed.            Discharge Procedure Orders   Diet Adult Regular     Notify your health care provider if you experience any of the following:  temperature >100.4     Notify your health care provider if you experience any of the following:  severe uncontrolled pain     Notify your health care provider if you experience any of the following:  redness, tenderness, or signs of infection (pain, swelling, redness, odor or green/yellow discharge around incision site)     Activity as tolerated      Follow-up Information       Harry Diamond MD Follow up in 2 week(s).    Specialty: Hematology and Oncology  Contact information:  [address]  [address]  [facility] [address] [phone]                           Activity: activity as tolerated  Diet: regular diet  Wound Care: keep wound clean and dry    Follow-up with me (Dr. Diamond) in 2 weeks.    Signed:  Harry Diamond  4/26/2023  8:56 AM

## 2023-04-26 NOTE — INTERVAL H&P NOTE
The patient has been examined and the H&P has been reviewed:    I concur with the findings and no changes have occurred since H&P was written.    Procedure risks, benefits and alternative options discussed and understood by patient/family.    We will proceed with bone marrow biopsy for further evaluation of his significant anemia.      Active Hospital Problems    Diagnosis  POA    *Macrocytic anemia [D53.9]  Yes      Resolved Hospital Problems   No resolved problems to display.

## 2023-04-28 LAB
DNA/RNA EXTRACT AND HOLD RESULT: NORMAL
DNA/RNA EXTRACTION: NORMAL
EXHR SPECIMEN TYPE: NORMAL

## 2023-05-01 ENCOUNTER — TELEPHONE (OUTPATIENT)
Dept: HEMATOLOGY/ONCOLOGY | Facility: CLINIC | Age: 68
End: 2023-05-01
Payer: MEDICARE

## 2023-05-01 ENCOUNTER — TELEPHONE (OUTPATIENT)
Dept: CARDIOLOGY | Facility: CLINIC | Age: 68
End: 2023-05-01
Payer: MEDICARE

## 2023-05-01 DIAGNOSIS — D46.9 MYELODYSPLASTIC SYNDROME: Primary | ICD-10-CM

## 2023-05-01 LAB
BODY SITE - BONE MARROW: NORMAL
CLINICAL DIAGNOSIS - BONE MARROW: NORMAL
FLOW CYTOMETRY ANTIBODIES ANALYZED - BONE MARROW: NORMAL
FLOW CYTOMETRY COMMENT - BONE MARROW: NORMAL
FLOW CYTOMETRY INTERPRETATION - BONE MARROW: NORMAL

## 2023-05-01 NOTE — TELEPHONE ENCOUNTER
This message wash already taken care with the     patient Daughter and  the Rehab Dept also     Called them and scheduled them ReHab.. Appointment.      Nw                                  ----- Message from Cindy Cesar sent at 5/1/2023 10:56 AM CDT -----  Contact: Susy(daughter)[phone]  Pt daughter is requesting a call back about the referral for PERIPHERAL VASCULAR REHABILITATION. Pt daughter is requesting assistance with scheduling.

## 2023-05-01 NOTE — TELEPHONE ENCOUNTER
I called and reviewed results of bone marrow biopsy, which suggests high-grade myelodysplastic syndrome with excess blasts. I will get him set up with hematology at [facility] for further evaluation.    Harry Diamond M.D.  Hematology/Oncology  [facility] - [address], [address]  [place] [address]  Phone: [phone]  Fax: [phone]

## 2023-05-02 ENCOUNTER — PES CALL (OUTPATIENT)
Dept: ADMINISTRATIVE | Facility: CLINIC | Age: 68
End: 2023-05-02
Payer: MEDICARE

## 2023-05-02 DIAGNOSIS — I25.10 CORONARY ARTERY CALCIFICATION SEEN ON CAT SCAN: ICD-10-CM

## 2023-05-02 DIAGNOSIS — E78.5 HYPERLIPIDEMIA, UNSPECIFIED HYPERLIPIDEMIA TYPE: ICD-10-CM

## 2023-05-03 ENCOUNTER — TELEPHONE (OUTPATIENT)
Dept: CARDIOLOGY | Facility: CLINIC | Age: 68
End: 2023-05-03
Payer: MEDICARE

## 2023-05-03 ENCOUNTER — DOCUMENTATION ONLY (OUTPATIENT)
Dept: HEMATOLOGY/ONCOLOGY | Facility: CLINIC | Age: 68
End: 2023-05-03
Payer: MEDICARE

## 2023-05-03 ENCOUNTER — LAB VISIT (OUTPATIENT)
Dept: LAB | Facility: HOSPITAL | Age: 68
End: 2023-05-03
Attending: INTERNAL MEDICINE
Payer: MEDICARE

## 2023-05-03 ENCOUNTER — TELEPHONE (OUTPATIENT)
Dept: HEMATOLOGY/ONCOLOGY | Facility: CLINIC | Age: 68
End: 2023-05-03
Payer: MEDICARE

## 2023-05-03 ENCOUNTER — TELEPHONE (OUTPATIENT)
Dept: FAMILY MEDICINE | Facility: HOSPITAL | Age: 68
End: 2023-05-03
Payer: MEDICARE

## 2023-05-03 DIAGNOSIS — D64.9 ANEMIA, UNSPECIFIED TYPE: ICD-10-CM

## 2023-05-03 DIAGNOSIS — D46.9 MYELODYSPLASTIC SYNDROME: ICD-10-CM

## 2023-05-03 DIAGNOSIS — D64.9 SYMPTOMATIC ANEMIA: ICD-10-CM

## 2023-05-03 DIAGNOSIS — D46.9 MYELODYSPLASTIC SYNDROME: Primary | ICD-10-CM

## 2023-05-03 LAB
ABO + RH BLD: NORMAL
ACANTHOCYTES BLD QL SMEAR: PRESENT
ANISOCYTOSIS BLD QL SMEAR: SLIGHT
BASOPHILS NFR BLD: 1 % (ref 0–1.9)
BLD GP AB SCN CELLS X3 SERPL QL: NORMAL
DIFFERENTIAL METHOD: ABNORMAL
EOSINOPHIL NFR BLD: 2 % (ref 0–8)
ERYTHROCYTE [DISTWIDTH] IN BLOOD BY AUTOMATED COUNT: 16.5 % (ref 11.5–14.5)
HCT VFR BLD AUTO: 19 % (ref 40–54)
HGB BLD-MCNC: 6.3 G/DL (ref 14–18)
HYPOCHROMIA BLD QL SMEAR: ABNORMAL
IMM GRANULOCYTES # BLD AUTO: ABNORMAL K/UL (ref 0–0.04)
IMM GRANULOCYTES NFR BLD AUTO: ABNORMAL % (ref 0–0.5)
LYMPHOCYTES NFR BLD: 71 % (ref 18–48)
MCH RBC QN AUTO: 32.1 PG (ref 27–31)
MCHC RBC AUTO-ENTMCNC: 33.2 G/DL (ref 32–36)
MCV RBC AUTO: 97 FL (ref 82–98)
MONOCYTES NFR BLD: 6 % (ref 4–15)
NEUTROPHILS NFR BLD: 20 % (ref 38–73)
NRBC BLD-RTO: 0 /100 WBC
OVALOCYTES BLD QL SMEAR: ABNORMAL
PLATELET # BLD AUTO: 583 K/UL (ref 150–450)
PLATELET BLD QL SMEAR: ABNORMAL
PMV BLD AUTO: 10.5 FL (ref 9.2–12.9)
POIKILOCYTOSIS BLD QL SMEAR: SLIGHT
RBC # BLD AUTO: 1.96 M/UL (ref 4.6–6.2)
SPECIMEN OUTDATE: NORMAL
WBC # BLD AUTO: 2.99 K/UL (ref 3.9–12.7)

## 2023-05-03 PROCEDURE — 86900 BLOOD TYPING SEROLOGIC ABO: CPT | Performed by: INTERNAL MEDICINE

## 2023-05-03 PROCEDURE — 36415 COLL VENOUS BLD VENIPUNCTURE: CPT | Performed by: INTERNAL MEDICINE

## 2023-05-03 PROCEDURE — 85007 BL SMEAR W/DIFF WBC COUNT: CPT | Performed by: INTERNAL MEDICINE

## 2023-05-03 PROCEDURE — 86920 COMPATIBILITY TEST SPIN: CPT | Performed by: INTERNAL MEDICINE

## 2023-05-03 PROCEDURE — P9021 RED BLOOD CELLS UNIT: HCPCS | Performed by: INTERNAL MEDICINE

## 2023-05-03 PROCEDURE — 85027 COMPLETE CBC AUTOMATED: CPT | Performed by: INTERNAL MEDICINE

## 2023-05-03 RX ORDER — ATORVASTATIN CALCIUM 80 MG/1
TABLET, FILM COATED ORAL
Qty: 90 TABLET | Refills: 3 | OUTPATIENT
Start: 2023-05-03

## 2023-05-03 NOTE — TELEPHONE ENCOUNTER
----- Message from Rony Moran sent at 5/3/2023 11:46 AM CDT -----  Contact: Pt. jose Jain  .Type:  Test Results    Who Called: pt. Jose Jain  Name of Test (Lab/Mammo/Etc):  labs  Date of Test: 05/03/2023  Ordering Provider:   Where the test was performed: [facility]  Would the patient rather a call back or a response via [facility]?  Call back  Best Call Back Number: [phone]  Additional Information:  Pt. Is requesting a call back regarding his lab results his blood cells count was low and he is requesting a blood transfusion.

## 2023-05-03 NOTE — TELEPHONE ENCOUNTER
----- Message from Leah Green sent at 5/3/2023  1:17 PM CDT -----  Type:  Patient Returning Call    Who Called:Daughter Susy  Who Left Message for Patient:office  Does the patient know what this is regarding?:n/a  Would the patient rather a call back or a response via [facility]? call  Best Call Back Number:   Additional Information:

## 2023-05-03 NOTE — TELEPHONE ENCOUNTER
Waleska from [facility] called with a Critical lab value of Hematocrit- 19.03. High Priority message sent to Dr. Harry Diamond and staff. Value entered into Flowsheets.

## 2023-05-03 NOTE — TELEPHONE ENCOUNTER
Reached out to Patient , Left him Detailed V/message    Regarding his appointment.    Patient already scheduled to see /Mary ponce    or his 4 mo f/u. (8/2023 ).    Nw                                ----- Message from Clair Guevara MA sent at 5/1/2023  4:57 PM CDT -----  [facility] Staff  Caller: Unspecified (Today, 10:43 AM)  Type:  Needs Medical Advice     Who Called: pt   Symptoms (please be specific): pt is requesting to get a return call pt is trying to get scheduled for the  I70.213 (ICD-10-CM) - Atherosclerosis of native artery of both lower extremities with intermittent claudication       Would the patient rather a call back or a response via [facility]? call   Best Call Back Number: [phone]   Additional Information:

## 2023-05-03 NOTE — TELEPHONE ENCOUNTER
Pt's daughter wanted to know if [facility] had any openings for a blood transfusion today 5/3. Sent a message to the staff regarding this matter and informed pt's daughter that this location does not have any opening today. Pt's daughter verbalized understanding. Advised pt's daughter to bring pt to ER if symptoms get worse or changes. Pt's daughter verbalized understanding.

## 2023-05-04 ENCOUNTER — INFUSION (OUTPATIENT)
Dept: INFUSION THERAPY | Facility: HOSPITAL | Age: 68
End: 2023-05-04
Attending: INTERNAL MEDICINE
Payer: MEDICARE

## 2023-05-04 VITALS
HEART RATE: 67 BPM | WEIGHT: 159 LBS | OXYGEN SATURATION: 100 % | RESPIRATION RATE: 18 BRPM | SYSTOLIC BLOOD PRESSURE: 110 MMHG | BODY MASS INDEX: 23.55 KG/M2 | HEIGHT: 69 IN | DIASTOLIC BLOOD PRESSURE: 55 MMHG | TEMPERATURE: 98 F

## 2023-05-04 DIAGNOSIS — D64.9 SYMPTOMATIC ANEMIA: ICD-10-CM

## 2023-05-04 DIAGNOSIS — D64.9 SYMPTOMATIC ANEMIA: Primary | ICD-10-CM

## 2023-05-04 DIAGNOSIS — D46.9 MYELODYSPLASTIC SYNDROME: Primary | ICD-10-CM

## 2023-05-04 LAB
BLD PROD TYP BPU: NORMAL
BLOOD UNIT EXPIRATION DATE: NORMAL
BLOOD UNIT TYPE CODE: 7300
BLOOD UNIT TYPE: NORMAL
CODING SYSTEM: NORMAL
CROSSMATCH INTERPRETATION: NORMAL
DISPENSE STATUS: NORMAL
TRANS ERYTHROCYTES VOL PATIENT: NORMAL ML

## 2023-05-04 PROCEDURE — 25000003 PHARM REV CODE 250: Performed by: NURSE PRACTITIONER

## 2023-05-04 PROCEDURE — P9021 RED BLOOD CELLS UNIT: HCPCS | Performed by: INTERNAL MEDICINE

## 2023-05-04 PROCEDURE — 36430 TRANSFUSION BLD/BLD COMPNT: CPT

## 2023-05-04 RX ORDER — HYDROCODONE BITARTRATE AND ACETAMINOPHEN 500; 5 MG/1; MG/1
TABLET ORAL ONCE
Status: CANCELLED | OUTPATIENT
Start: 2023-05-04 | End: 2023-05-04

## 2023-05-04 RX ORDER — HYDROCODONE BITARTRATE AND ACETAMINOPHEN 500; 5 MG/1; MG/1
TABLET ORAL ONCE
Status: COMPLETED | OUTPATIENT
Start: 2023-05-04 | End: 2023-05-04

## 2023-05-04 RX ADMIN — SODIUM CHLORIDE: 9 INJECTION, SOLUTION INTRAVENOUS at 11:05

## 2023-05-04 NOTE — NURSING
1 unit PRBC infused to R AC 22g. Blood consent in chart. Blood checked and verified with RN.  Vital signs obtained per protocol.  Pt tolerated it well. AVS given.  Pt will follow up with MD. Discharged with no acute distress.

## 2023-05-08 ENCOUNTER — OFFICE VISIT (OUTPATIENT)
Dept: HEMATOLOGY/ONCOLOGY | Facility: CLINIC | Age: 68
End: 2023-05-08
Payer: MEDICARE

## 2023-05-08 VITALS
RESPIRATION RATE: 16 BRPM | OXYGEN SATURATION: 97 % | SYSTOLIC BLOOD PRESSURE: 124 MMHG | TEMPERATURE: 98 F | BODY MASS INDEX: 23.87 KG/M2 | WEIGHT: 161.19 LBS | HEIGHT: 69 IN | HEART RATE: 81 BPM | DIASTOLIC BLOOD PRESSURE: 60 MMHG

## 2023-05-08 DIAGNOSIS — D84.81 IMMUNODEFICIENCY SECONDARY TO NEOPLASM: ICD-10-CM

## 2023-05-08 DIAGNOSIS — D49.9 IMMUNODEFICIENCY SECONDARY TO NEOPLASM: ICD-10-CM

## 2023-05-08 DIAGNOSIS — D64.9 SYMPTOMATIC ANEMIA: ICD-10-CM

## 2023-05-08 DIAGNOSIS — Z76.82 STEM CELL TRANSPLANT CANDIDATE: ICD-10-CM

## 2023-05-08 DIAGNOSIS — D46.9 MYELODYSPLASTIC SYNDROME: Primary | ICD-10-CM

## 2023-05-08 DIAGNOSIS — D69.6 THROMBOCYTOPENIA: ICD-10-CM

## 2023-05-08 PROCEDURE — 99417 PROLNG OP E/M EACH 15 MIN: CPT | Mod: S$GLB,,, | Performed by: INTERNAL MEDICINE

## 2023-05-08 PROCEDURE — 3288F FALL RISK ASSESSMENT DOCD: CPT | Mod: CPTII,S$GLB,, | Performed by: INTERNAL MEDICINE

## 2023-05-08 PROCEDURE — 1159F MED LIST DOCD IN RCRD: CPT | Mod: CPTII,S$GLB,, | Performed by: INTERNAL MEDICINE

## 2023-05-08 PROCEDURE — 99499 RISK ADDL DX/OHS AUDIT: ICD-10-PCS | Mod: S$GLB,,, | Performed by: INTERNAL MEDICINE

## 2023-05-08 PROCEDURE — 99999 PR PBB SHADOW E&M-EST. PATIENT-LVL III: CPT | Mod: PBBFAC,,, | Performed by: INTERNAL MEDICINE

## 2023-05-08 PROCEDURE — 1126F AMNT PAIN NOTED NONE PRSNT: CPT | Mod: CPTII,S$GLB,, | Performed by: INTERNAL MEDICINE

## 2023-05-08 PROCEDURE — 1126F PR PAIN SEVERITY QUANTIFIED, NO PAIN PRESENT: ICD-10-PCS | Mod: CPTII,S$GLB,, | Performed by: INTERNAL MEDICINE

## 2023-05-08 PROCEDURE — 1101F PT FALLS ASSESS-DOCD LE1/YR: CPT | Mod: CPTII,S$GLB,, | Performed by: INTERNAL MEDICINE

## 2023-05-08 PROCEDURE — 99417 PR PROLONGED SVC, OUTPT, W/WO DIRECT PT CONTACT,  EA ADDTL 15 MIN: ICD-10-PCS | Mod: S$GLB,,, | Performed by: INTERNAL MEDICINE

## 2023-05-08 PROCEDURE — 99499 UNLISTED E&M SERVICE: CPT | Mod: S$GLB,,, | Performed by: INTERNAL MEDICINE

## 2023-05-08 PROCEDURE — 3074F PR MOST RECENT SYSTOLIC BLOOD PRESSURE < 130 MM HG: ICD-10-PCS | Mod: CPTII,S$GLB,, | Performed by: INTERNAL MEDICINE

## 2023-05-08 PROCEDURE — 99999 PR PBB SHADOW E&M-EST. PATIENT-LVL III: ICD-10-PCS | Mod: PBBFAC,,, | Performed by: INTERNAL MEDICINE

## 2023-05-08 PROCEDURE — 99215 PR OFFICE/OUTPT VISIT, EST, LEVL V, 40-54 MIN: ICD-10-PCS | Mod: S$GLB,,, | Performed by: INTERNAL MEDICINE

## 2023-05-08 PROCEDURE — 1160F PR REVIEW ALL MEDS BY PRESCRIBER/CLIN PHARMACIST DOCUMENTED: ICD-10-PCS | Mod: CPTII,S$GLB,, | Performed by: INTERNAL MEDICINE

## 2023-05-08 PROCEDURE — 1101F PR PT FALLS ASSESS DOC 0-1 FALLS W/OUT INJ PAST YR: ICD-10-PCS | Mod: CPTII,S$GLB,, | Performed by: INTERNAL MEDICINE

## 2023-05-08 PROCEDURE — 3288F PR FALLS RISK ASSESSMENT DOCUMENTED: ICD-10-PCS | Mod: CPTII,S$GLB,, | Performed by: INTERNAL MEDICINE

## 2023-05-08 PROCEDURE — 99215 OFFICE O/P EST HI 40 MIN: CPT | Mod: S$GLB,,, | Performed by: INTERNAL MEDICINE

## 2023-05-08 PROCEDURE — 1159F PR MEDICATION LIST DOCUMENTED IN MEDICAL RECORD: ICD-10-PCS | Mod: CPTII,S$GLB,, | Performed by: INTERNAL MEDICINE

## 2023-05-08 PROCEDURE — 3078F PR MOST RECENT DIASTOLIC BLOOD PRESSURE < 80 MM HG: ICD-10-PCS | Mod: CPTII,S$GLB,, | Performed by: INTERNAL MEDICINE

## 2023-05-08 PROCEDURE — 3008F BODY MASS INDEX DOCD: CPT | Mod: CPTII,S$GLB,, | Performed by: INTERNAL MEDICINE

## 2023-05-08 PROCEDURE — 3078F DIAST BP <80 MM HG: CPT | Mod: CPTII,S$GLB,, | Performed by: INTERNAL MEDICINE

## 2023-05-08 PROCEDURE — 3008F PR BODY MASS INDEX (BMI) DOCUMENTED: ICD-10-PCS | Mod: CPTII,S$GLB,, | Performed by: INTERNAL MEDICINE

## 2023-05-08 PROCEDURE — 1160F RVW MEDS BY RX/DR IN RCRD: CPT | Mod: CPTII,S$GLB,, | Performed by: INTERNAL MEDICINE

## 2023-05-08 PROCEDURE — 3074F SYST BP LT 130 MM HG: CPT | Mod: CPTII,S$GLB,, | Performed by: INTERNAL MEDICINE

## 2023-05-08 NOTE — Clinical Note
Matti Mcdonald! I'm going to arrange a repeat biopsy ASAP here. Would you mind monitoring labs weekly for now? They're just a little nervous about his anemia. I'll let you know when we get results back. Thanks, Paco

## 2023-05-08 NOTE — PROGRESS NOTES
Section of Hematology and Stem Cell Transplantation  New Patient Consult     Date of visit: 5/8/23  Visit diagnosis: Myelodysplastic syndrome [D46.9]  Referred by:  Dr. Harry Diamond    Oncologic History:     Primary Oncologic Diagnosis: Myelodysplastic syndrome    4/12/23: Initial evaluation by Dr. Diamond of anemia. WBC 2.71, Hgb 7.1, Plts  400. Prior to this visit, he was in [place] for a dental procedure. His symptoms of fatigue started after extractions. Workup by Dr. Diamond unremarkable.   4/26/23: Bone marrow biopsy revealed a hypercellular marrow (80-90%) with increased blasts (8-12%) concerning for MDS EB2. However, sample was limited.     History of Present Ilness:   Guillaume Salinas (Guillaume) is a pleasant 67 y.o.male with PVD, CAD, HTN who presents for initial consultation regarding MDS. His oncologic history is detailed above. He has been more fatigued lately. Denies recent fevers, chills, weight loss. He was attempting to work with cardiac rehab; however, he has been unable to do so due to fatigue.     Patient was seen today in conjunction with a .    PAST MEDICAL HISTORY:   Past Medical History:   Diagnosis Date    Anticoagulant long-term use     Coronary artery disease     Hypertension     Peripheral vascular disease, unspecified        PAST SURGICAL HISTORY:   Past Surgical History:   Procedure Laterality Date    BONE MARROW BIOPSY Left 4/26/2023    Procedure: Biopsy-bone marrow;  Surgeon: Harry Diamond MD;  Location: [facility] OR;  Service: Oncology;  Laterality: Left;    COLONOSCOPY N/A 01/05/2022    Procedure: COLONOSCOPY Golytely Vaccinated will bring cards;  Surgeon: Dereje Simon MD;  Location: [facility] ENDO;  Service: Endoscopy;  Laterality: N/A;  Do not cancel this order       PAST SOCIAL HISTORY:  Social History     Tobacco Use    Smoking status: Former     Packs/day: 0.25     Years: 50.00     Pack years: 12.50     Types: Cigarettes     Quit date: 3/1/2023      Years since quittin.1    Smokeless tobacco: Never    Tobacco comments:     pt does not want to quit   Substance Use Topics    Alcohol use: Not Currently    Drug use: Never       FAMILY HISTORY:  Family History   Problem Relation Age of Onset    Hypertension Mother     Cancer Father     Cancer Brother     No Known Problems Daughter     No Known Problems Daughter     No Known Problems Son        CURRENT MEDICATIONS:   Current Outpatient Medications   Medication Sig    aspirin (ECOTRIN) 81 MG EC tablet Take 1 tablet (81 mg total) by mouth once daily.    carvediloL (COREG) 6.25 MG tablet TAKE 1 TABLET(6.25 MG) BY MOUTH TWICE DAILY WITH MEALS    cilostazoL (PLETAL) 50 MG Tab Take 1 tablet (50 mg total) by mouth 2 (two) times daily.    gabapentin (NEURONTIN) 300 MG capsule Take 1 capsule (300 mg total) by mouth 3 (three) times daily.    zolpidem (AMBIEN) 10 mg Tab Take 10 mg by mouth nightly as needed.    atorvastatin (LIPITOR) 80 MG tablet Take 1 tablet (80 mg total) by mouth once daily. (Patient not taking: Reported on 2023)     No current facility-administered medications for this visit.       ALLERGIES:   Review of patient's allergies indicates:  No Known Allergies    Review of Systems:     Pertinent positives and negatives included in the HPI. Otherwise a complete review of systems is negative.    Physical Exam:     Vitals:    23 1535   BP: 124/60   Pulse: 81   Resp: 16   Temp: 98.2 °F (36.8 °C)     General: Appears well, NAD  HEENT: MMM, no OP lesions  Pulmonary: CTAB, no increased work of breathing, no W/R/C  Cardiovascular: S1S2 normal, RRR, no M/R/G  Abdominal: Soft, NT, ND, BS+, no HSM  Extremities: No C/C/E  Neurological: AAOx4, grossly normal, no focal deficits  Dermatologic: No appreciable rashes or lesions  Lymphatic: No cervical, axillary, or inguinal lymphadenopathy     ECOG Performance Status: (foot note - ECOG PS provided by Eastern Cooperative Oncology Group) 1 - Symptomatic but  completely ambulatory    Karnofsky Performance Score:  80%- Normal Activity with Effort: Some Symptoms of Disease    Labs:   Lab Results   Component Value Date    WBC 2.99 (L) 05/03/2023    RBC 1.96 (L) 05/03/2023    HGB 6.3 (L) 05/03/2023    HCT 19.0 (LL) 05/03/2023    MCV 97 05/03/2023    MCH 32.1 (H) 05/03/2023    MCHC 33.2 05/03/2023    RDW 16.5 (H) 05/03/2023     (H) 05/03/2023    MPV 10.5 05/03/2023    GRAN 20.0 (L) 05/03/2023    LYMPH 71.0 (H) 05/03/2023    MONO 6.0 05/03/2023    EOS 0.1 04/17/2023    BASO 0.04 04/17/2023    EOSINOPHIL 2.0 05/03/2023    BASOPHIL 1.0 05/03/2023       CMP  Sodium   Date Value Ref Range Status   04/17/2023 141 136 - 145 mmol/L Final     Potassium   Date Value Ref Range Status   04/17/2023 4.5 3.5 - 5.1 mmol/L Final     Chloride   Date Value Ref Range Status   04/17/2023 109 95 - 110 mmol/L Final     CO2   Date Value Ref Range Status   04/17/2023 23 23 - 29 mmol/L Final     Glucose   Date Value Ref Range Status   04/17/2023 106 70 - 110 mg/dL Final     BUN   Date Value Ref Range Status   04/17/2023 22 8 - 23 mg/dL Final     Creatinine   Date Value Ref Range Status   04/17/2023 1.2 0.5 - 1.4 mg/dL Final     Calcium   Date Value Ref Range Status   04/17/2023 8.7 8.7 - 10.5 mg/dL Final     Total Protein   Date Value Ref Range Status   04/17/2023 6.8 6.0 - 8.4 g/dL Final     Albumin   Date Value Ref Range Status   04/17/2023 3.5 3.5 - 5.2 g/dL Final     Total Bilirubin   Date Value Ref Range Status   04/17/2023 0.3 0.1 - 1.0 mg/dL Final     Comment:     For infants and newborns, interpretation of results should be based  on gestational age, weight and in agreement with clinical  observations.    Premature Infant recommended reference ranges:  Up to 24 hours.............<8.0 mg/dL  Up to 48 hours............<12.0 mg/dL  3-5 days..................<15.0 mg/dL  6-29 days.................<15.0 mg/dL       Alkaline Phosphatase   Date Value Ref Range Status   04/17/2023 21 10 - 025  U/L Final     AST   Date Value Ref Range Status   04/17/2023 14 10 - 40 U/L Final     ALT   Date Value Ref Range Status   04/17/2023 20 10 - 44 U/L Final     Anion Gap   Date Value Ref Range Status   04/17/2023 9 8 - 16 mmol/L Final     eGFR if    Date Value Ref Range Status   08/27/2021 >60 >60 mL/min/1.73 m^2 Final   08/27/2021 >60 >60 mL/min/1.73 m^2 Final   08/27/2021 >60 >60 mL/min/1.73 m^2 Final     eGFR if non    Date Value Ref Range Status   08/27/2021 57 (A) >60 mL/min/1.73 m^2 Final     Comment:     Calculation used to obtain the estimated glomerular filtration  rate (eGFR) is the CKD-EPI equation.      08/27/2021 57 (A) >60 mL/min/1.73 m^2 Final     Comment:     Calculation used to obtain the estimated glomerular filtration  rate (eGFR) is the CKD-EPI equation.      08/27/2021 57 (A) >60 mL/min/1.73 m^2 Final     Comment:     Calculation used to obtain the estimated glomerular filtration  rate (eGFR) is the CKD-EPI equation.          Imaging:   No results found for this or any previous visit (from the past 2160 hour(s)).    Pathology:  Reviewed     Assessment and Plan:   Guillaume Salinas (Guillaume) is a pleasant 67 y.o.male with PVD, CAD, HTN who presents for initial consultation regarding MDS.    Myelodysplastic syndrome: His bone marrow biopsy is most consistent with MDS; however, the sample was limited. Given increased blasts noted, I believe it would be most beneficial to repeat a bone marrow biopsy ASAP to confirm the initial findings. We had an extensive discussion today regarding the diagnosis of MDS and AML, including natural history, prognosis, and treatment options.  Bone marrow biopsy ASAP. Consent signed today. Orders placed. Declined Ativan.    Stem cell transplant candidate: We briefly discussed allogeneic stem cell transplantation in MDS/AML. Will discuss in further pending results of his bone marrow biopsy.    Symptomatic anemia: Related to MDS. Will  request weekly monitoring with Dr. Diamond. Transfuse for Hgb <7.    Thrombocytopenia: Related to MDS. Monitor and transfuse for Plts <10.    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 Continue to monitor and start ppx antimicrobials if ANC <500.    Orders/Follow Up:      Orders Placed This Encounter    Bone marrow    Leukemia/Lymphoma Screen - Bone Marrow Right Posterior Iliac Crest    Chromosome Analysis, Bone Marrow Right Posterior Iliac Crest    HEME DISORDERS DNA/RNA HOLD, Blood    Myelodysplastic Syndrome (MDS), FISH, Bone Marrow    OncoHeme (NGS) Hematologic Neoplasms, BM Diagnosis or Indication for test: Myelodysplastic syndrome    Comprehensive Metabolic Panel    CBC Auto Differential    Magnesium    Phosphorus    Uric Acid    Lactate Dehydrogenase    Type & Screen    Specimen to Pathology, Bone Marrow Aspiration/Biopsy       Route Chart for Scheduling    BMT Chart Routing  Urgent    Follow up with physician 2 weeks. Please arrange bone marrow biopsy ASAP (within next week). Follow up 1 week after biopsy.   Follow up with [facility]    Provider visit type    Infusion scheduling note    Injection scheduling note    Labs    Imaging    Pharmacy appointment    Other referrals               Advance Care Planning   Date: 05/08/2023  We reviewed his underlying diagnosis including natural history, prognosis, and various treatment options. He remains interested in pursuing any and all treatment options in an effort to improve his quality and quantity of life. Will discuss treatment options pending biopsy results.        Total time of this visit was 83 minutes, including time spent face to face with patient, and also in preparing for today's visit for MDM and documentation. (Medical Decision Making, including consideration of possible diagnoses, management options, complex medical record review, review of diagnostic tests and information, consideration and discussion of significant complications based on  comorbidities, and discussion with providers involved with the care of the patient). Greater than 50% was spent face to face with the patient counseling and coordinating care.    Paco Hickey MD  Hematology, Oncology, and Stem Cell Transplantation  [facility]

## 2023-05-09 LAB
CHROM BANDING METHOD: NORMAL
CHROMOSOME ANALYSIS BM ADDITIONAL INFORMATION: NORMAL
CHROMOSOME ANALYSIS BM RELEASED BY: NORMAL
CHROMOSOME ANALYSIS BM RESULT SUMMARY: NORMAL
CLINICAL CYTOGENETICIST REVIEW: NORMAL
KARYOTYP MAR: NORMAL
REASON FOR REFERRAL (NARRATIVE): NORMAL
REF LAB TEST METHOD: NORMAL
SPECIMEN SOURCE: NORMAL
SPECIMEN: NORMAL

## 2023-05-10 ENCOUNTER — LAB VISIT (OUTPATIENT)
Dept: LAB | Facility: HOSPITAL | Age: 68
End: 2023-05-10
Attending: INTERNAL MEDICINE
Payer: MEDICARE

## 2023-05-10 DIAGNOSIS — D64.9 SYMPTOMATIC ANEMIA: ICD-10-CM

## 2023-05-10 DIAGNOSIS — D64.9 ANEMIA, UNSPECIFIED TYPE: ICD-10-CM

## 2023-05-10 LAB
ABO + RH BLD: NORMAL
BASOPHILS # BLD AUTO: 0.03 K/UL (ref 0–0.2)
BASOPHILS NFR BLD: 0.8 % (ref 0–1.9)
BLD GP AB SCN CELLS X3 SERPL QL: NORMAL
DIFFERENTIAL METHOD: ABNORMAL
EOSINOPHIL # BLD AUTO: 0.1 K/UL (ref 0–0.5)
EOSINOPHIL NFR BLD: 3.4 % (ref 0–8)
ERYTHROCYTE [DISTWIDTH] IN BLOOD BY AUTOMATED COUNT: 16.5 % (ref 11.5–14.5)
FINAL PATHOLOGIC DIAGNOSIS: NORMAL
GROSS: NORMAL
HCT VFR BLD AUTO: 22.1 % (ref 40–54)
HGB BLD-MCNC: 7.1 G/DL (ref 14–18)
IMM GRANULOCYTES # BLD AUTO: 0.14 K/UL (ref 0–0.04)
IMM GRANULOCYTES NFR BLD AUTO: 3.7 % (ref 0–0.5)
LYMPHOCYTES # BLD AUTO: 1.9 K/UL (ref 1–4.8)
LYMPHOCYTES NFR BLD: 49.1 % (ref 18–48)
Lab: NORMAL
MCH RBC QN AUTO: 30.5 PG (ref 27–31)
MCHC RBC AUTO-ENTMCNC: 32.1 G/DL (ref 32–36)
MCV RBC AUTO: 95 FL (ref 82–98)
MICROSCOPIC EXAM: NORMAL
MONOCYTES # BLD AUTO: 0.2 K/UL (ref 0.3–1)
MONOCYTES NFR BLD: 4.7 % (ref 4–15)
NEUTROPHILS # BLD AUTO: 1.5 K/UL (ref 1.8–7.7)
NEUTROPHILS NFR BLD: 38.3 % (ref 38–73)
NRBC BLD-RTO: 0 /100 WBC
PLATELET # BLD AUTO: 501 K/UL (ref 150–450)
PMV BLD AUTO: 10.7 FL (ref 9.2–12.9)
RBC # BLD AUTO: 2.33 M/UL (ref 4.6–6.2)
SPECIMEN OUTDATE: NORMAL
SUPPLEMENTAL DIAGNOSIS: NORMAL
WBC # BLD AUTO: 3.81 K/UL (ref 3.9–12.7)

## 2023-05-10 PROCEDURE — 36415 COLL VENOUS BLD VENIPUNCTURE: CPT | Performed by: INTERNAL MEDICINE

## 2023-05-10 PROCEDURE — 85025 COMPLETE CBC W/AUTO DIFF WBC: CPT | Performed by: INTERNAL MEDICINE

## 2023-05-10 PROCEDURE — 86900 BLOOD TYPING SEROLOGIC ABO: CPT | Performed by: INTERNAL MEDICINE

## 2023-05-10 NOTE — PROGRESS NOTES
PATIENT: Guillaume Salinas  MRN: [id number]  DATE: 5/11/2023    Diagnosis:   1. Myelodysplastic syndrome    2. Symptomatic anemia    3. Immunodeficiency secondary to neoplasm      Chief Complaint: myelodysplastic syndrome    Oncologic History:      Oncologic History Myelodysplastic syndrome      Oncologic Treatment To be determined      Pathology 4/26/23:  Bone marrow, left iliac crest, aspirate, clot, and core biopsy:   --Hypercellular marrow for age, 80-90% with trilineage maturation showing increased blasts and small megakaryocytic forms, most compatible with a primary marrow neoplasm, favor myelodysplasia with excess blasts (MDS-EB-2) with impending evolution, final   classification pending correlation with cytogenetic and molecular studies, see comment   --Stainable iron is not increased   --Mild reticulin fibrosis          Bone marrow, left iliac crest, aspirate, clot, and core biopsy:   --Hypercellular marrow for age, 80-90% with trilineage maturation showing increased blasts and small megakaryocytic forms, most compatible with a primary marrow neoplasm, favor myelodysplasia with excess blasts (MDS-EB-2) with impending evolution, final   classification pending correlation with cytogenetic and molecular studies, see comment   --Stainable iron is not increased   --Mild reticulin fibrosis     Comment:  Concomitantly submitted flow cytometric analysis detects a mildly increased myeloblast population, concerning for a primary marrow process.  The blast gate is increased with approximately 8% CD38/CD34 positive blasts identified.  Populations of    B lymphocytes are polyclonal and T lymphocytes are immunophenotypically unremarkable.     This study is somewhat limited by lack of cellularity on aspirate smears with mild reticulin fibrosis noted.  However, there are increased CD34 positive blasts, counted at 12% on the immunohistochemical stain with increased megakaryocytes showing many micromegakaryocytic  "forms.  The morphology in conjunction with peripheral cytopenias is compatible with a primary marrow neoplasm, highly suggestive of myelodysplasia with excess blasts (MDS-EB-2) although an evolving acute leukemia is of concern.     Correlation with clinical findings and any available cytogenetic and molecular studies is required for further characterization.              Subjective:    History of Present Illness: Mr. Betsy Salinas is a 67 y.o. male who presented in April 2023 for evaluation and management of anemia.    [I do not see evidence of anemia in his labs, but he was referred for anemia workup.]    - he went to [place] for a dental procedure. He had several teeth removed ("an intense procedure per his wife"). He had taken antibiotics/amoxicillin and ibuprofen. He had the second part of procedure about a week later. He noted severe fatigue, weakness.      Interval history:  - he presents for a follow-up appointment for his myelodysplastic syndrome.  - he underwent bone marrow biopsy on 4/26/23.  - he met with Dr. Hickey on 5/8/23. He recommended repeat bone marrow biopsy.    - today, he endorses fatigue, weakness, dyspnea upon exertion. He denies chest pain, nausea, vomiting, diarrhea, constipation.        Past medical, surgical, family, and social histories have been reviewed and updated below.    Past Medical History:   Past Medical History:   Diagnosis Date    Anticoagulant long-term use     Coronary artery disease     Hypertension     Peripheral vascular disease, unspecified        Past Surgical History:   Past Surgical History:   Procedure Laterality Date    BONE MARROW BIOPSY Left 4/26/2023    Procedure: Biopsy-bone marrow;  Surgeon: Harry Diamond MD;  Location: [facility] OR;  Service: Oncology;  Laterality: Left;    COLONOSCOPY N/A 01/05/2022    Procedure: COLONOSCOPY Golytely Vaccinated will bring cards;  Surgeon: Dereje Simon MD;  Location: [facility] ENDO;  Service: Endoscopy;  " Laterality: N/A;  Do not cancel this order       Family History:   Family History   Problem Relation Age of Onset    Hypertension Mother     Cancer Father     Cancer Brother     No Known Problems Daughter     No Known Problems Daughter     No Known Problems Son        Social History:  reports that he quit smoking about 2 months ago. His smoking use included cigarettes. He has a 12.50 pack-year smoking history. He has never used smokeless tobacco. He reports that he does not currently use alcohol. He reports that he does not use drugs.    Allergies:  Review of patient's allergies indicates:  No Known Allergies    Medications:  Current Outpatient Medications   Medication Sig Dispense Refill    aspirin (ECOTRIN) 81 MG EC tablet Take 1 tablet (81 mg total) by mouth once daily. 30 tablet 12    atorvastatin (LIPITOR) 80 MG tablet Take 1 tablet (80 mg total) by mouth once daily. (Patient not taking: Reported on 5/8/2023) 90 tablet 3    carvediloL (COREG) 6.25 MG tablet TAKE 1 TABLET(6.25 MG) BY MOUTH TWICE DAILY WITH MEALS 180 tablet 3    cilostazoL (PLETAL) 50 MG Tab Take 1 tablet (50 mg total) by mouth 2 (two) times daily. 180 tablet 3    gabapentin (NEURONTIN) 300 MG capsule Take 1 capsule (300 mg total) by mouth 3 (three) times daily. 90 capsule 11    zolpidem (AMBIEN) 10 mg Tab Take 10 mg by mouth nightly as needed.       No current facility-administered medications for this visit.       Review of Systems   Constitutional:  Positive for fatigue.   HENT:  Negative for sore throat.    Eyes:  Negative for visual disturbance.   Respiratory:  Positive for shortness of breath.    Cardiovascular:  Negative for chest pain.   Gastrointestinal:  Negative for abdominal pain.   Genitourinary:  Negative for dysuria.   Musculoskeletal:  Negative for back pain.   Skin:  Negative for rash.   Neurological:  Positive for weakness. Negative for headaches.   Hematological:  Negative for adenopathy.   Psychiatric/Behavioral:  The patient  is not nervous/anxious.      ECOG Performance Status:   ECOG SCORE    1 - Restricted in strenuous activity-ambulatory and able to carry out work of a light nature         Objective:      Vitals:   Vitals:    05/11/23 0907   BP: (!) 101/59   BP Location: Left arm   Patient Position: Sitting   BP Method: Medium (Automatic)   Pulse: 83   Resp: 18   SpO2: 97%   Weight: 72.4 kg (159 lb 9.8 oz)     BMI: Body mass index is 23.57 kg/m².    Physical Exam  Vitals and nursing note reviewed.   Constitutional:       Appearance: He is well-developed.   HENT:      Head: Normocephalic and atraumatic.   Eyes:      Pupils: Pupils are equal, round, and reactive to light.   Cardiovascular:      Rate and Rhythm: Normal rate and regular rhythm.   Pulmonary:      Effort: Pulmonary effort is normal.      Breath sounds: Normal breath sounds.   Abdominal:      General: Bowel sounds are normal.      Palpations: Abdomen is soft.   Musculoskeletal:         General: Normal range of motion.      Cervical back: Normal range of motion and neck supple.   Skin:     General: Skin is warm and dry.   Neurological:      Mental Status: He is alert and oriented to person, place, and time.   Psychiatric:         Behavior: Behavior normal.         Thought Content: Thought content normal.         Judgment: Judgment normal.       Laboratory Data:  Labs have been reviewed.    Lab Results   Component Value Date    WBC 3.81 (L) 05/10/2023    HGB 7.1 (L) 05/10/2023    HCT 22.1 (L) 05/10/2023    MCV 95 05/10/2023     (H) 05/10/2023           Imaging:        Assessment:       1. Myelodysplastic syndrome    2. Symptomatic anemia    3. Immunodeficiency secondary to neoplasm           Plan:     Myelodysplastic syndrome  - bone marrow biopsy (4/26/23) revealed myelodysplastic syndrome.  - he met with Dr. Hickey on 5/8/23. He recommended repeat bone marrow biopsy.  - in the interim, we will arrange for weekly labs and blood transfusions as needed for clinical  symptoms/symptomatic anemia.  - Labs have been reviewed. Hemoglobin is 7.1 g/dL> will plan for blood transfusion on 5/15/23.  - return to clinic in one month.    2. Advance Care Planning     Power of   After our discussion (at previous visit), the patient has decided not to complete a HCPOA.       - return to clinic in one month.    Harry Diamond M.D.  Hematology/Oncology  [facility] - [address], [address]  [place] [address]  Phone: [phone]  Fax: [phone]

## 2023-05-11 ENCOUNTER — TELEPHONE (OUTPATIENT)
Dept: HEMATOLOGY/ONCOLOGY | Facility: CLINIC | Age: 68
End: 2023-05-11
Payer: MEDICARE

## 2023-05-11 ENCOUNTER — OFFICE VISIT (OUTPATIENT)
Dept: HEMATOLOGY/ONCOLOGY | Facility: CLINIC | Age: 68
End: 2023-05-11
Payer: MEDICARE

## 2023-05-11 VITALS
OXYGEN SATURATION: 97 % | HEART RATE: 83 BPM | RESPIRATION RATE: 18 BRPM | WEIGHT: 159.63 LBS | BODY MASS INDEX: 23.57 KG/M2 | DIASTOLIC BLOOD PRESSURE: 59 MMHG | SYSTOLIC BLOOD PRESSURE: 101 MMHG

## 2023-05-11 DIAGNOSIS — D64.9 SYMPTOMATIC ANEMIA: ICD-10-CM

## 2023-05-11 DIAGNOSIS — D84.81 IMMUNODEFICIENCY SECONDARY TO NEOPLASM: ICD-10-CM

## 2023-05-11 DIAGNOSIS — D49.9 IMMUNODEFICIENCY SECONDARY TO NEOPLASM: ICD-10-CM

## 2023-05-11 DIAGNOSIS — D46.9 MYELODYSPLASTIC SYNDROME: Primary | ICD-10-CM

## 2023-05-11 PROCEDURE — 3288F FALL RISK ASSESSMENT DOCD: CPT | Mod: CPTII,S$GLB,, | Performed by: INTERNAL MEDICINE

## 2023-05-11 PROCEDURE — 3078F PR MOST RECENT DIASTOLIC BLOOD PRESSURE < 80 MM HG: ICD-10-PCS | Mod: CPTII,S$GLB,, | Performed by: INTERNAL MEDICINE

## 2023-05-11 PROCEDURE — 3078F DIAST BP <80 MM HG: CPT | Mod: CPTII,S$GLB,, | Performed by: INTERNAL MEDICINE

## 2023-05-11 PROCEDURE — 99999 PR PBB SHADOW E&M-EST. PATIENT-LVL III: ICD-10-PCS | Mod: PBBFAC,,, | Performed by: INTERNAL MEDICINE

## 2023-05-11 PROCEDURE — 3008F BODY MASS INDEX DOCD: CPT | Mod: CPTII,S$GLB,, | Performed by: INTERNAL MEDICINE

## 2023-05-11 PROCEDURE — 99214 PR OFFICE/OUTPT VISIT, EST, LEVL IV, 30-39 MIN: ICD-10-PCS | Mod: S$GLB,,, | Performed by: INTERNAL MEDICINE

## 2023-05-11 PROCEDURE — 3074F PR MOST RECENT SYSTOLIC BLOOD PRESSURE < 130 MM HG: ICD-10-PCS | Mod: CPTII,S$GLB,, | Performed by: INTERNAL MEDICINE

## 2023-05-11 PROCEDURE — 1126F PR PAIN SEVERITY QUANTIFIED, NO PAIN PRESENT: ICD-10-PCS | Mod: CPTII,S$GLB,, | Performed by: INTERNAL MEDICINE

## 2023-05-11 PROCEDURE — 1159F MED LIST DOCD IN RCRD: CPT | Mod: CPTII,S$GLB,, | Performed by: INTERNAL MEDICINE

## 2023-05-11 PROCEDURE — 1160F RVW MEDS BY RX/DR IN RCRD: CPT | Mod: CPTII,S$GLB,, | Performed by: INTERNAL MEDICINE

## 2023-05-11 PROCEDURE — 1159F PR MEDICATION LIST DOCUMENTED IN MEDICAL RECORD: ICD-10-PCS | Mod: CPTII,S$GLB,, | Performed by: INTERNAL MEDICINE

## 2023-05-11 PROCEDURE — 3288F PR FALLS RISK ASSESSMENT DOCUMENTED: ICD-10-PCS | Mod: CPTII,S$GLB,, | Performed by: INTERNAL MEDICINE

## 2023-05-11 PROCEDURE — 99999 PR PBB SHADOW E&M-EST. PATIENT-LVL III: CPT | Mod: PBBFAC,,, | Performed by: INTERNAL MEDICINE

## 2023-05-11 PROCEDURE — 1160F PR REVIEW ALL MEDS BY PRESCRIBER/CLIN PHARMACIST DOCUMENTED: ICD-10-PCS | Mod: CPTII,S$GLB,, | Performed by: INTERNAL MEDICINE

## 2023-05-11 PROCEDURE — 1101F PT FALLS ASSESS-DOCD LE1/YR: CPT | Mod: CPTII,S$GLB,, | Performed by: INTERNAL MEDICINE

## 2023-05-11 PROCEDURE — 1126F AMNT PAIN NOTED NONE PRSNT: CPT | Mod: CPTII,S$GLB,, | Performed by: INTERNAL MEDICINE

## 2023-05-11 PROCEDURE — 1101F PR PT FALLS ASSESS DOC 0-1 FALLS W/OUT INJ PAST YR: ICD-10-PCS | Mod: CPTII,S$GLB,, | Performed by: INTERNAL MEDICINE

## 2023-05-11 PROCEDURE — 3074F SYST BP LT 130 MM HG: CPT | Mod: CPTII,S$GLB,, | Performed by: INTERNAL MEDICINE

## 2023-05-11 PROCEDURE — 3008F PR BODY MASS INDEX (BMI) DOCUMENTED: ICD-10-PCS | Mod: CPTII,S$GLB,, | Performed by: INTERNAL MEDICINE

## 2023-05-11 PROCEDURE — 99214 OFFICE O/P EST MOD 30 MIN: CPT | Mod: S$GLB,,, | Performed by: INTERNAL MEDICINE

## 2023-05-11 RX ORDER — HYDROCODONE BITARTRATE AND ACETAMINOPHEN 500; 5 MG/1; MG/1
TABLET ORAL ONCE
Status: CANCELLED | OUTPATIENT
Start: 2023-05-11 | End: 2023-05-11

## 2023-05-11 RX ORDER — DIPHENHYDRAMINE HCL 25 MG
25 CAPSULE ORAL
Status: CANCELLED | OUTPATIENT
Start: 2023-05-11

## 2023-05-11 RX ORDER — ACETAMINOPHEN 325 MG/1
650 TABLET ORAL
Status: CANCELLED | OUTPATIENT
Start: 2023-05-11

## 2023-05-11 NOTE — Clinical Note
Good morning,  Would you like me to get anything approved and ready to go for when you get results of upcoming bone marrow biopsy? Do you want me to get vidaza approved? Or just hold off for now?  Thanks so much!!

## 2023-05-12 DIAGNOSIS — D46.9 MYELODYSPLASTIC SYNDROME: ICD-10-CM

## 2023-05-13 ENCOUNTER — LAB VISIT (OUTPATIENT)
Dept: LAB | Facility: HOSPITAL | Age: 68
End: 2023-05-13
Payer: MEDICARE

## 2023-05-13 DIAGNOSIS — D64.9 SYMPTOMATIC ANEMIA: ICD-10-CM

## 2023-05-13 DIAGNOSIS — D46.9 MYELODYSPLASTIC SYNDROME: ICD-10-CM

## 2023-05-13 LAB
ABO + RH BLD: NORMAL
ACANTHOCYTES BLD QL SMEAR: PRESENT
ANISOCYTOSIS BLD QL SMEAR: SLIGHT
BASOPHILS NFR BLD: 2 % (ref 0–1.9)
BLD GP AB SCN CELLS X3 SERPL QL: NORMAL
DACRYOCYTES BLD QL SMEAR: ABNORMAL
DIFFERENTIAL METHOD: ABNORMAL
EOSINOPHIL NFR BLD: 5 % (ref 0–8)
ERYTHROCYTE [DISTWIDTH] IN BLOOD BY AUTOMATED COUNT: 16.3 % (ref 11.5–14.5)
HCT VFR BLD AUTO: 20.6 % (ref 40–54)
HGB BLD-MCNC: 6.6 G/DL (ref 14–18)
HYPOCHROMIA BLD QL SMEAR: ABNORMAL
IMM GRANULOCYTES # BLD AUTO: ABNORMAL K/UL (ref 0–0.04)
IMM GRANULOCYTES NFR BLD AUTO: ABNORMAL % (ref 0–0.5)
LYMPHOCYTES NFR BLD: 61 % (ref 18–48)
MCH RBC QN AUTO: 30.7 PG (ref 27–31)
MCHC RBC AUTO-ENTMCNC: 32 G/DL (ref 32–36)
MCV RBC AUTO: 96 FL (ref 82–98)
MONOCYTES NFR BLD: 8 % (ref 4–15)
NEUTROPHILS NFR BLD: 24 % (ref 38–73)
NRBC BLD-RTO: 0 /100 WBC
PLATELET # BLD AUTO: 495 K/UL (ref 150–450)
PLATELET BLD QL SMEAR: ABNORMAL
PMV BLD AUTO: 10.4 FL (ref 9.2–12.9)
POIKILOCYTOSIS BLD QL SMEAR: SLIGHT
RBC # BLD AUTO: 2.15 M/UL (ref 4.6–6.2)
SCHISTOCYTES BLD QL SMEAR: ABNORMAL
SCHISTOCYTES BLD QL SMEAR: PRESENT
SPECIMEN OUTDATE: NORMAL
WBC # BLD AUTO: 2.57 K/UL (ref 3.9–12.7)

## 2023-05-13 PROCEDURE — 85025 COMPLETE CBC W/AUTO DIFF WBC: CPT | Performed by: INTERNAL MEDICINE

## 2023-05-13 PROCEDURE — 86900 BLOOD TYPING SEROLOGIC ABO: CPT | Performed by: INTERNAL MEDICINE

## 2023-05-13 PROCEDURE — 27201040 HC RC 50 FILTER: Performed by: INTERNAL MEDICINE

## 2023-05-13 PROCEDURE — 36415 COLL VENOUS BLD VENIPUNCTURE: CPT | Performed by: INTERNAL MEDICINE

## 2023-05-13 PROCEDURE — 86920 COMPATIBILITY TEST SPIN: CPT | Performed by: INTERNAL MEDICINE

## 2023-05-13 PROCEDURE — 82668 ASSAY OF ERYTHROPOIETIN: CPT | Performed by: INTERNAL MEDICINE

## 2023-05-13 NOTE — PLAN OF CARE
START ON PATHWAY REGIMEN - MDS    [id number]        Azacitidine (Vidaza)     **Always confirm dose/schedule in your pharmacy ordering system**    Patient Characteristics:  Higher-Risk (IPSS-R Score > 3.5), First Line, Transplant Candidate  WHO Disease Classification: MDS-EB2  Bone Marrow Blasts (percent): 5% - 10%  Cytogenetic Category: Unknown  Platelets (x 10^9/L): ? 100  Absolute Neutrophil Count (x 10^9/L): ? 0.8  Line of Therapy: First Line  IPSS-R Risk Category: Unknown  IPSS-R Risk Score: Unknown  Check here if patient's risk score was calculated prior to the International   Prognostic Scoring System-Revised (IPSS-R): false  Hemoglobin (g/dl): < 8  Patient Characteristics: Transplant Candidate  Intent of Therapy:  Non-Curative / Palliative Intent, Discussed with Patient

## 2023-05-15 ENCOUNTER — INFUSION (OUTPATIENT)
Dept: INFUSION THERAPY | Facility: HOSPITAL | Age: 68
End: 2023-05-15
Attending: INTERNAL MEDICINE
Payer: MEDICARE

## 2023-05-15 VITALS
WEIGHT: 159.63 LBS | SYSTOLIC BLOOD PRESSURE: 112 MMHG | RESPIRATION RATE: 18 BRPM | HEIGHT: 69 IN | HEART RATE: 62 BPM | TEMPERATURE: 98 F | BODY MASS INDEX: 23.64 KG/M2 | OXYGEN SATURATION: 98 % | DIASTOLIC BLOOD PRESSURE: 59 MMHG

## 2023-05-15 DIAGNOSIS — D46.9 MYELODYSPLASTIC SYNDROME: ICD-10-CM

## 2023-05-15 DIAGNOSIS — D64.9 SYMPTOMATIC ANEMIA: ICD-10-CM

## 2023-05-15 LAB
BLD PROD TYP BPU: NORMAL
BLOOD UNIT EXPIRATION DATE: NORMAL
BLOOD UNIT TYPE CODE: 5100
BLOOD UNIT TYPE: NORMAL
CODING SYSTEM: NORMAL
CROSSMATCH INTERPRETATION: NORMAL
DISPENSE STATUS: NORMAL
NUM UNITS TRANS PACKED RBC: NORMAL

## 2023-05-15 PROCEDURE — 27201040 HC RC 50 FILTER: Performed by: INTERNAL MEDICINE

## 2023-05-15 PROCEDURE — 25000003 PHARM REV CODE 250: Performed by: INTERNAL MEDICINE

## 2023-05-15 PROCEDURE — 36430 TRANSFUSION BLD/BLD COMPNT: CPT

## 2023-05-15 PROCEDURE — P9038 RBC IRRADIATED: HCPCS | Performed by: INTERNAL MEDICINE

## 2023-05-15 RX ORDER — DIPHENHYDRAMINE HCL 25 MG
25 CAPSULE ORAL
Status: DISCONTINUED | OUTPATIENT
Start: 2023-05-15 | End: 2023-05-15 | Stop reason: HOSPADM

## 2023-05-15 RX ORDER — ACETAMINOPHEN 325 MG/1
650 TABLET ORAL
Status: DISCONTINUED | OUTPATIENT
Start: 2023-05-15 | End: 2023-05-15 | Stop reason: HOSPADM

## 2023-05-15 RX ORDER — HYDROCODONE BITARTRATE AND ACETAMINOPHEN 500; 5 MG/1; MG/1
TABLET ORAL ONCE
Status: COMPLETED | OUTPATIENT
Start: 2023-05-15 | End: 2023-05-15

## 2023-05-15 RX ADMIN — SODIUM CHLORIDE: 9 INJECTION, SOLUTION INTRAVENOUS at 09:05

## 2023-05-15 NOTE — NURSING
Pt received 1 unit PRBC through PIV without difficulties. S/S of transfusion reaction discussed with patient. Pt verbalized understanding.  Pt tolerated it well. AVS given. Next appointment scheduled/ Pt will follow up with MD. Discharged with no acute distress.

## 2023-05-17 ENCOUNTER — LAB VISIT (OUTPATIENT)
Dept: LAB | Facility: HOSPITAL | Age: 68
End: 2023-05-17
Attending: INTERNAL MEDICINE
Payer: MEDICARE

## 2023-05-17 ENCOUNTER — TELEPHONE (OUTPATIENT)
Dept: INFUSION THERAPY | Facility: HOSPITAL | Age: 68
End: 2023-05-17
Payer: MEDICARE

## 2023-05-17 DIAGNOSIS — D64.9 SYMPTOMATIC ANEMIA: ICD-10-CM

## 2023-05-17 DIAGNOSIS — D64.9 SYMPTOMATIC ANEMIA: Primary | ICD-10-CM

## 2023-05-17 DIAGNOSIS — D46.9 MYELODYSPLASTIC SYNDROME: ICD-10-CM

## 2023-05-17 LAB
ABO + RH BLD: NORMAL
ALBUMIN SERPL BCP-MCNC: 3.8 G/DL (ref 3.5–5.2)
ALP SERPL-CCNC: 68 U/L (ref 55–135)
ALT SERPL W/O P-5'-P-CCNC: 27 U/L (ref 10–44)
ANION GAP SERPL CALC-SCNC: 10 MMOL/L (ref 8–16)
ANISOCYTOSIS BLD QL SMEAR: SLIGHT
AST SERPL-CCNC: 18 U/L (ref 10–40)
BASOPHILS # BLD AUTO: 0.03 K/UL (ref 0–0.2)
BASOPHILS NFR BLD: 1.2 % (ref 0–1.9)
BILIRUB SERPL-MCNC: 0.4 MG/DL (ref 0.1–1)
BLD GP AB SCN CELLS X3 SERPL QL: NORMAL
BUN SERPL-MCNC: 18 MG/DL (ref 8–23)
CALCIUM SERPL-MCNC: 8.8 MG/DL (ref 8.7–10.5)
CHLORIDE SERPL-SCNC: 106 MMOL/L (ref 95–110)
CO2 SERPL-SCNC: 23 MMOL/L (ref 23–29)
CREAT SERPL-MCNC: 1 MG/DL (ref 0.5–1.4)
DIFFERENTIAL METHOD: ABNORMAL
EOSINOPHIL # BLD AUTO: 0.1 K/UL (ref 0–0.5)
EOSINOPHIL NFR BLD: 3.2 % (ref 0–8)
EPO SERPL-ACNC: ABNORMAL MIU/ML (ref 2.6–18.5)
ERYTHROCYTE [DISTWIDTH] IN BLOOD BY AUTOMATED COUNT: 16.4 % (ref 11.5–14.5)
EST. GFR  (NO RACE VARIABLE): >60 ML/MIN/1.73 M^2
GLUCOSE SERPL-MCNC: 121 MG/DL (ref 70–110)
HCT VFR BLD AUTO: 21.1 % (ref 40–54)
HGB BLD-MCNC: 6.9 G/DL (ref 14–18)
IMM GRANULOCYTES # BLD AUTO: 0.07 K/UL (ref 0–0.04)
IMM GRANULOCYTES NFR BLD AUTO: 2.8 % (ref 0–0.5)
LDH SERPL L TO P-CCNC: 146 U/L (ref 110–260)
LYMPHOCYTES # BLD AUTO: 1.5 K/UL (ref 1–4.8)
LYMPHOCYTES NFR BLD: 61.8 % (ref 18–48)
MCH RBC QN AUTO: 30 PG (ref 27–31)
MCHC RBC AUTO-ENTMCNC: 32.7 G/DL (ref 32–36)
MCV RBC AUTO: 92 FL (ref 82–98)
MONOCYTES # BLD AUTO: 0.2 K/UL (ref 0.3–1)
MONOCYTES NFR BLD: 6 % (ref 4–15)
NEUTROPHILS # BLD AUTO: 0.6 K/UL (ref 1.8–7.7)
NEUTROPHILS NFR BLD: 25 % (ref 38–73)
NRBC BLD-RTO: 0 /100 WBC
PLATELET # BLD AUTO: 541 K/UL (ref 150–450)
PLATELET BLD QL SMEAR: ABNORMAL
PMV BLD AUTO: 10.8 FL (ref 9.2–12.9)
POTASSIUM SERPL-SCNC: 4.1 MMOL/L (ref 3.5–5.1)
PROT SERPL-MCNC: 7.1 G/DL (ref 6–8.4)
RBC # BLD AUTO: 2.3 M/UL (ref 4.6–6.2)
SODIUM SERPL-SCNC: 139 MMOL/L (ref 136–145)
SPECIMEN OUTDATE: NORMAL
URATE SERPL-MCNC: 4.6 MG/DL (ref 3.4–7)
WBC # BLD AUTO: 2.49 K/UL (ref 3.9–12.7)

## 2023-05-17 PROCEDURE — 84550 ASSAY OF BLOOD/URIC ACID: CPT | Performed by: INTERNAL MEDICINE

## 2023-05-17 PROCEDURE — 85025 COMPLETE CBC W/AUTO DIFF WBC: CPT | Performed by: INTERNAL MEDICINE

## 2023-05-17 PROCEDURE — 86900 BLOOD TYPING SEROLOGIC ABO: CPT | Performed by: INTERNAL MEDICINE

## 2023-05-17 PROCEDURE — 86920 COMPATIBILITY TEST SPIN: CPT | Performed by: INTERNAL MEDICINE

## 2023-05-17 PROCEDURE — 83615 LACTATE (LD) (LDH) ENZYME: CPT | Performed by: INTERNAL MEDICINE

## 2023-05-17 PROCEDURE — 80053 COMPREHEN METABOLIC PANEL: CPT | Performed by: INTERNAL MEDICINE

## 2023-05-17 RX ORDER — ACETAMINOPHEN 325 MG/1
650 TABLET ORAL
Status: CANCELLED | OUTPATIENT
Start: 2023-05-17

## 2023-05-17 RX ORDER — HYDROCODONE BITARTRATE AND ACETAMINOPHEN 500; 5 MG/1; MG/1
TABLET ORAL ONCE
Status: CANCELLED | OUTPATIENT
Start: 2023-05-17 | End: 2023-05-17

## 2023-05-17 RX ORDER — DIPHENHYDRAMINE HCL 25 MG
25 CAPSULE ORAL
Status: CANCELLED | OUTPATIENT
Start: 2023-05-17

## 2023-05-17 NOTE — TELEPHONE ENCOUNTER
Using language line (Tanika [id number]) called the patient to confirm transfusion appt for tomorrow 5/18 at 930a

## 2023-05-18 ENCOUNTER — INFUSION (OUTPATIENT)
Dept: INFUSION THERAPY | Facility: HOSPITAL | Age: 68
End: 2023-05-18
Attending: INTERNAL MEDICINE
Payer: MEDICARE

## 2023-05-18 VITALS
HEART RATE: 67 BPM | HEIGHT: 69 IN | TEMPERATURE: 98 F | OXYGEN SATURATION: 100 % | BODY MASS INDEX: 23.64 KG/M2 | DIASTOLIC BLOOD PRESSURE: 64 MMHG | SYSTOLIC BLOOD PRESSURE: 118 MMHG | RESPIRATION RATE: 18 BRPM | WEIGHT: 159.63 LBS

## 2023-05-18 DIAGNOSIS — D64.9 SYMPTOMATIC ANEMIA: ICD-10-CM

## 2023-05-18 PROCEDURE — 36430 TRANSFUSION BLD/BLD COMPNT: CPT

## 2023-05-18 PROCEDURE — 25000003 PHARM REV CODE 250: Performed by: INTERNAL MEDICINE

## 2023-05-18 PROCEDURE — P9040 RBC LEUKOREDUCED IRRADIATED: HCPCS | Performed by: INTERNAL MEDICINE

## 2023-05-18 RX ORDER — ACETAMINOPHEN 325 MG/1
650 TABLET ORAL
Status: DISCONTINUED | OUTPATIENT
Start: 2023-05-18 | End: 2023-05-18 | Stop reason: HOSPADM

## 2023-05-18 RX ORDER — DIPHENHYDRAMINE HCL 25 MG
25 CAPSULE ORAL
Status: DISCONTINUED | OUTPATIENT
Start: 2023-05-18 | End: 2023-05-18 | Stop reason: HOSPADM

## 2023-05-18 RX ORDER — HYDROCODONE BITARTRATE AND ACETAMINOPHEN 500; 5 MG/1; MG/1
TABLET ORAL ONCE
Status: COMPLETED | OUTPATIENT
Start: 2023-05-18 | End: 2023-05-18

## 2023-05-18 RX ADMIN — SODIUM CHLORIDE: 9 INJECTION, SOLUTION INTRAVENOUS at 10:05

## 2023-05-18 NOTE — NURSING
Pt tolerated 1 unit of PRBC infusion well.  No adverse reaction noted.   IV flushed with NS and D/C per protocol.  Patient left clinic in no acute distress.

## 2023-05-23 ENCOUNTER — PROCEDURE VISIT (OUTPATIENT)
Dept: HEMATOLOGY/ONCOLOGY | Facility: CLINIC | Age: 68
End: 2023-05-23
Payer: MEDICARE

## 2023-05-23 ENCOUNTER — APPOINTMENT (OUTPATIENT)
Dept: LAB | Facility: HOSPITAL | Age: 68
End: 2023-05-23
Payer: MEDICARE

## 2023-05-23 VITALS
TEMPERATURE: 98 F | DIASTOLIC BLOOD PRESSURE: 80 MMHG | SYSTOLIC BLOOD PRESSURE: 135 MMHG | RESPIRATION RATE: 16 BRPM | HEART RATE: 80 BPM | OXYGEN SATURATION: 98 %

## 2023-05-23 DIAGNOSIS — D46.9 MYELODYSPLASTIC SYNDROME: ICD-10-CM

## 2023-05-23 PROCEDURE — 88271 CYTOGENETICS DNA PROBE: CPT | Performed by: INTERNAL MEDICINE

## 2023-05-23 PROCEDURE — 88313 PR  SPECIAL STAINS,GROUP II: ICD-10-PCS | Mod: 26,,, | Performed by: PATHOLOGY

## 2023-05-23 PROCEDURE — 88237 TISSUE CULTURE BONE MARROW: CPT | Performed by: INTERNAL MEDICINE

## 2023-05-23 PROCEDURE — 88311 DECALCIFY TISSUE: CPT | Performed by: PATHOLOGY

## 2023-05-23 PROCEDURE — 88313 SPECIAL STAINS GROUP 2: CPT | Mod: 59 | Performed by: PATHOLOGY

## 2023-05-23 PROCEDURE — 88313 SPECIAL STAINS GROUP 2: CPT | Mod: 26,,, | Performed by: PATHOLOGY

## 2023-05-23 PROCEDURE — 88185 FLOWCYTOMETRY/TC ADD-ON: CPT | Mod: 59 | Performed by: PATHOLOGY

## 2023-05-23 PROCEDURE — 85097 BONE MARROW INTERPRETATION: CPT | Mod: ,,, | Performed by: PATHOLOGY

## 2023-05-23 PROCEDURE — 88311 DECALCIFY TISSUE: CPT | Mod: 26,,, | Performed by: PATHOLOGY

## 2023-05-23 PROCEDURE — 88299 UNLISTED CYTOGENETIC STUDY: CPT | Performed by: NURSE PRACTITIONER

## 2023-05-23 PROCEDURE — 88342 CHG IMMUNOCYTOCHEMISTRY: ICD-10-PCS | Mod: 26,59,, | Performed by: PATHOLOGY

## 2023-05-23 PROCEDURE — 81450 HL NEO GSAP 5-50DNA/DNA&RNA: CPT | Performed by: INTERNAL MEDICINE

## 2023-05-23 PROCEDURE — 88184 FLOWCYTOMETRY/ TC 1 MARKER: CPT | Performed by: PATHOLOGY

## 2023-05-23 PROCEDURE — 88341 PR IHC OR ICC EACH ADD'L SINGLE ANTIBODY  STAINPR: ICD-10-PCS | Mod: 26,,, | Performed by: PATHOLOGY

## 2023-05-23 PROCEDURE — 88342 IMHCHEM/IMCYTCHM 1ST ANTB: CPT | Mod: 26,59,, | Performed by: PATHOLOGY

## 2023-05-23 PROCEDURE — 88341 IMHCHEM/IMCYTCHM EA ADD ANTB: CPT | Mod: 26,,, | Performed by: PATHOLOGY

## 2023-05-23 PROCEDURE — 88305 TISSUE EXAM BY PATHOLOGIST: CPT | Mod: 26,,, | Performed by: PATHOLOGY

## 2023-05-23 PROCEDURE — 88305 TISSUE EXAM BY PATHOLOGIST: ICD-10-PCS | Mod: 26,,, | Performed by: PATHOLOGY

## 2023-05-23 PROCEDURE — 38222 BONE MARROW: ICD-10-PCS | Mod: LT,,, | Performed by: NURSE PRACTITIONER

## 2023-05-23 PROCEDURE — 88341 IMHCHEM/IMCYTCHM EA ADD ANTB: CPT | Mod: 59 | Performed by: PATHOLOGY

## 2023-05-23 PROCEDURE — 88342 IMHCHEM/IMCYTCHM 1ST ANTB: CPT | Mod: 59 | Performed by: PATHOLOGY

## 2023-05-23 PROCEDURE — 88264 CHROMOSOME ANALYSIS 20-25: CPT | Performed by: INTERNAL MEDICINE

## 2023-05-23 PROCEDURE — 85097 PR  BONE MARROW,SMEAR INTERPRETATION: ICD-10-PCS | Mod: ,,, | Performed by: PATHOLOGY

## 2023-05-23 PROCEDURE — 38222 DX BONE MARROW BX & ASPIR: CPT | Mod: LT,,, | Performed by: NURSE PRACTITIONER

## 2023-05-23 PROCEDURE — 88189 PR  FLOWCYTOMETRY/READ, 16 & > MARKERS: ICD-10-PCS | Mod: ,,, | Performed by: PATHOLOGY

## 2023-05-23 PROCEDURE — 88189 FLOWCYTOMETRY/READ 16 & >: CPT | Mod: ,,, | Performed by: PATHOLOGY

## 2023-05-23 PROCEDURE — 88305 TISSUE EXAM BY PATHOLOGIST: CPT | Performed by: PATHOLOGY

## 2023-05-23 PROCEDURE — 88311 PR  DECALCIFY TISSUE: ICD-10-PCS | Mod: 26,,, | Performed by: PATHOLOGY

## 2023-05-23 RX ORDER — LIDOCAINE HYDROCHLORIDE 20 MG/ML
10 INJECTION, SOLUTION EPIDURAL; INFILTRATION; INTRACAUDAL; PERINEURAL ONCE
Status: COMPLETED | OUTPATIENT
Start: 2023-05-23 | End: 2023-05-23

## 2023-05-23 RX ADMIN — LIDOCAINE HYDROCHLORIDE 7 ML: 20 INJECTION, SOLUTION EPIDURAL; INFILTRATION; INTRACAUDAL; PERINEURAL at 10:05

## 2023-05-23 NOTE — PROCEDURES
Bone marrow    Date/Time: 5/23/2023 10:00 AM  Performed by: Estefany Cartagena NP  Authorized by: Mohit Hickey MD     Aspiration?: Yes   Biopsy?: Yes    PROCEDURE NOTE:  Date of Procedure: 05/23/2023   Bone Marrow Biopsy and Aspiration  Indication: MDS diagnostic  Consent: Informed consent was obtained from patient.  Timeout: Done and documented.  Position: Prone  Site: Left posterior illiac crest.  Prep: Betadine.  Needle used: 11 gauge Jamshidi needle.  Anesthetic: 2% lidocaine 7 cc.  Biopsy: The biopsy needle was introduced into the marrow cavity and an aspirate was obtained without complications and sent for flow cytometry,MDS FISH, DNA HOLD, NGS and cytogenetics. Core biopsy obtained without difficulty and sent for routine histologic examination.  Complications: None.  Disposition: Left patient with primary nurse,instructed to lay on back for 20 minutes. Advised not to take shower and keep dressing clean, dry & intact for 24 hours.  Blood loss: Minimal.     Estefany Cartagena NP  Hematology/Oncology/BMT

## 2023-05-23 NOTE — PROCEDURES
Patient with cytopenia presented at BMT clinic for diagnostic bone marrow biopsy. Tolerated procedure well. Not in any distress.  See 's note  for full history. Reviewed medications, allergies and labs.     Estefany Cartagena NP  Hematology/Oncology/BMT

## 2023-05-25 LAB
DNA/RNA EXTRACT AND HOLD RESULT: NORMAL
DNA/RNA EXTRACTION: NORMAL
EXHR SPECIMEN TYPE: NORMAL

## 2023-05-30 ENCOUNTER — OFFICE VISIT (OUTPATIENT)
Dept: HEMATOLOGY/ONCOLOGY | Facility: CLINIC | Age: 68
End: 2023-05-30
Payer: MEDICARE

## 2023-05-30 ENCOUNTER — LAB VISIT (OUTPATIENT)
Dept: LAB | Facility: HOSPITAL | Age: 68
End: 2023-05-30
Attending: INTERNAL MEDICINE
Payer: MEDICARE

## 2023-05-30 VITALS
HEART RATE: 73 BPM | SYSTOLIC BLOOD PRESSURE: 115 MMHG | RESPIRATION RATE: 18 BRPM | WEIGHT: 162.56 LBS | BODY MASS INDEX: 24.08 KG/M2 | DIASTOLIC BLOOD PRESSURE: 59 MMHG | TEMPERATURE: 98 F | HEIGHT: 69 IN | OXYGEN SATURATION: 98 %

## 2023-05-30 DIAGNOSIS — D46.9 MYELODYSPLASTIC SYNDROME: Primary | ICD-10-CM

## 2023-05-30 DIAGNOSIS — Z91.89 AT HIGH RISK OF TUMOR LYSIS SYNDROME: ICD-10-CM

## 2023-05-30 DIAGNOSIS — D49.9 IMMUNODEFICIENCY SECONDARY TO NEOPLASM: ICD-10-CM

## 2023-05-30 DIAGNOSIS — D84.81 IMMUNODEFICIENCY SECONDARY TO NEOPLASM: ICD-10-CM

## 2023-05-30 DIAGNOSIS — Z76.82 STEM CELL TRANSPLANT CANDIDATE: ICD-10-CM

## 2023-05-30 DIAGNOSIS — D46.9 MYELODYSPLASTIC SYNDROME: ICD-10-CM

## 2023-05-30 LAB
ABO + RH BLD: NORMAL
ANISOCYTOSIS BLD QL SMEAR: SLIGHT
BASO STIPL BLD QL SMEAR: ABNORMAL
BASOPHILS # BLD AUTO: 0.03 K/UL (ref 0–0.2)
BASOPHILS NFR BLD: 1.1 % (ref 0–1.9)
BLD GP AB SCN CELLS X3 SERPL QL: NORMAL
DACRYOCYTES BLD QL SMEAR: ABNORMAL
DIFFERENTIAL METHOD: ABNORMAL
EOSINOPHIL # BLD AUTO: 0.1 K/UL (ref 0–0.5)
EOSINOPHIL NFR BLD: 3.3 % (ref 0–8)
ERYTHROCYTE [DISTWIDTH] IN BLOOD BY AUTOMATED COUNT: 14.7 % (ref 11.5–14.5)
GIANT PLATELETS BLD QL SMEAR: PRESENT
HCT VFR BLD AUTO: 21.9 % (ref 40–54)
HGB BLD-MCNC: 7.2 G/DL (ref 14–18)
HYPOCHROMIA BLD QL SMEAR: ABNORMAL
IMM GRANULOCYTES # BLD AUTO: 0.07 K/UL (ref 0–0.04)
IMM GRANULOCYTES NFR BLD AUTO: 2.6 % (ref 0–0.5)
LYMPHOCYTES # BLD AUTO: 1.8 K/UL (ref 1–4.8)
LYMPHOCYTES NFR BLD: 66.5 % (ref 18–48)
MCH RBC QN AUTO: 29.9 PG (ref 27–31)
MCHC RBC AUTO-ENTMCNC: 32.9 G/DL (ref 32–36)
MCV RBC AUTO: 91 FL (ref 82–98)
MONOCYTES # BLD AUTO: 0.2 K/UL (ref 0.3–1)
MONOCYTES NFR BLD: 6.3 % (ref 4–15)
NEUTROPHILS # BLD AUTO: 0.5 K/UL (ref 1.8–7.7)
NEUTROPHILS NFR BLD: 20.2 % (ref 38–73)
NRBC BLD-RTO: 0 /100 WBC
PLATELET # BLD AUTO: 556 K/UL (ref 150–450)
PLATELET BLD QL SMEAR: ABNORMAL
PMV BLD AUTO: 10.6 FL (ref 9.2–12.9)
POIKILOCYTOSIS BLD QL SMEAR: SLIGHT
POLYCHROMASIA BLD QL SMEAR: ABNORMAL
RBC # BLD AUTO: 2.41 M/UL (ref 4.6–6.2)
SCHISTOCYTES BLD QL SMEAR: ABNORMAL
SPECIMEN OUTDATE: NORMAL
WBC # BLD AUTO: 2.69 K/UL (ref 3.9–12.7)

## 2023-05-30 PROCEDURE — 99215 OFFICE O/P EST HI 40 MIN: CPT | Mod: S$GLB,,, | Performed by: INTERNAL MEDICINE

## 2023-05-30 PROCEDURE — 1101F PR PT FALLS ASSESS DOC 0-1 FALLS W/OUT INJ PAST YR: ICD-10-PCS | Mod: CPTII,S$GLB,, | Performed by: INTERNAL MEDICINE

## 2023-05-30 PROCEDURE — 3288F FALL RISK ASSESSMENT DOCD: CPT | Mod: CPTII,S$GLB,, | Performed by: INTERNAL MEDICINE

## 2023-05-30 PROCEDURE — 3078F DIAST BP <80 MM HG: CPT | Mod: CPTII,S$GLB,, | Performed by: INTERNAL MEDICINE

## 2023-05-30 PROCEDURE — 3074F SYST BP LT 130 MM HG: CPT | Mod: CPTII,S$GLB,, | Performed by: INTERNAL MEDICINE

## 2023-05-30 PROCEDURE — 1159F PR MEDICATION LIST DOCUMENTED IN MEDICAL RECORD: ICD-10-PCS | Mod: CPTII,S$GLB,, | Performed by: INTERNAL MEDICINE

## 2023-05-30 PROCEDURE — 1159F MED LIST DOCD IN RCRD: CPT | Mod: CPTII,S$GLB,, | Performed by: INTERNAL MEDICINE

## 2023-05-30 PROCEDURE — 99999 PR PBB SHADOW E&M-EST. PATIENT-LVL III: CPT | Mod: PBBFAC,,, | Performed by: INTERNAL MEDICINE

## 2023-05-30 PROCEDURE — 1160F PR REVIEW ALL MEDS BY PRESCRIBER/CLIN PHARMACIST DOCUMENTED: ICD-10-PCS | Mod: CPTII,S$GLB,, | Performed by: INTERNAL MEDICINE

## 2023-05-30 PROCEDURE — 1101F PT FALLS ASSESS-DOCD LE1/YR: CPT | Mod: CPTII,S$GLB,, | Performed by: INTERNAL MEDICINE

## 2023-05-30 PROCEDURE — 1126F AMNT PAIN NOTED NONE PRSNT: CPT | Mod: CPTII,S$GLB,, | Performed by: INTERNAL MEDICINE

## 2023-05-30 PROCEDURE — 85025 COMPLETE CBC W/AUTO DIFF WBC: CPT | Performed by: INTERNAL MEDICINE

## 2023-05-30 PROCEDURE — 99999 PR PBB SHADOW E&M-EST. PATIENT-LVL III: ICD-10-PCS | Mod: PBBFAC,,, | Performed by: INTERNAL MEDICINE

## 2023-05-30 PROCEDURE — 3074F PR MOST RECENT SYSTOLIC BLOOD PRESSURE < 130 MM HG: ICD-10-PCS | Mod: CPTII,S$GLB,, | Performed by: INTERNAL MEDICINE

## 2023-05-30 PROCEDURE — 86900 BLOOD TYPING SEROLOGIC ABO: CPT | Performed by: INTERNAL MEDICINE

## 2023-05-30 PROCEDURE — 1160F RVW MEDS BY RX/DR IN RCRD: CPT | Mod: CPTII,S$GLB,, | Performed by: INTERNAL MEDICINE

## 2023-05-30 PROCEDURE — 3008F PR BODY MASS INDEX (BMI) DOCUMENTED: ICD-10-PCS | Mod: CPTII,S$GLB,, | Performed by: INTERNAL MEDICINE

## 2023-05-30 PROCEDURE — 99417 PR PROLONGED SVC, OUTPT, W/WO DIRECT PT CONTACT,  EA ADDTL 15 MIN: ICD-10-PCS | Mod: S$GLB,,, | Performed by: INTERNAL MEDICINE

## 2023-05-30 PROCEDURE — 99499 UNLISTED E&M SERVICE: CPT | Mod: S$GLB,,, | Performed by: INTERNAL MEDICINE

## 2023-05-30 PROCEDURE — 99417 PROLNG OP E/M EACH 15 MIN: CPT | Mod: S$GLB,,, | Performed by: INTERNAL MEDICINE

## 2023-05-30 PROCEDURE — 3078F PR MOST RECENT DIASTOLIC BLOOD PRESSURE < 80 MM HG: ICD-10-PCS | Mod: CPTII,S$GLB,, | Performed by: INTERNAL MEDICINE

## 2023-05-30 PROCEDURE — 3288F PR FALLS RISK ASSESSMENT DOCUMENTED: ICD-10-PCS | Mod: CPTII,S$GLB,, | Performed by: INTERNAL MEDICINE

## 2023-05-30 PROCEDURE — 99215 PR OFFICE/OUTPT VISIT, EST, LEVL V, 40-54 MIN: ICD-10-PCS | Mod: S$GLB,,, | Performed by: INTERNAL MEDICINE

## 2023-05-30 PROCEDURE — 99499 RISK ADDL DX/OHS AUDIT: ICD-10-PCS | Mod: S$GLB,,, | Performed by: INTERNAL MEDICINE

## 2023-05-30 PROCEDURE — 3008F BODY MASS INDEX DOCD: CPT | Mod: CPTII,S$GLB,, | Performed by: INTERNAL MEDICINE

## 2023-05-30 PROCEDURE — 1126F PR PAIN SEVERITY QUANTIFIED, NO PAIN PRESENT: ICD-10-PCS | Mod: CPTII,S$GLB,, | Performed by: INTERNAL MEDICINE

## 2023-05-30 RX ORDER — LEVOFLOXACIN 500 MG/1
500 TABLET, FILM COATED ORAL DAILY
Qty: 30 TABLET | Refills: 11 | Status: SHIPPED | OUTPATIENT
Start: 2023-05-30 | End: 2023-07-06 | Stop reason: SDUPTHER

## 2023-05-30 RX ORDER — ALLOPURINOL 300 MG/1
300 TABLET ORAL 2 TIMES DAILY
Qty: 60 TABLET | Refills: 11 | Status: SHIPPED | OUTPATIENT
Start: 2023-05-30 | End: 2023-08-15

## 2023-05-30 RX ORDER — ACYCLOVIR 400 MG/1
400 TABLET ORAL 2 TIMES DAILY
Qty: 60 TABLET | Refills: 11 | Status: SHIPPED | OUTPATIENT
Start: 2023-05-30 | End: 2023-07-06 | Stop reason: SDUPTHER

## 2023-05-30 RX ORDER — POSACONAZOLE 100 MG/1
300 TABLET, DELAYED RELEASE ORAL 2 TIMES DAILY
Qty: 6 TABLET | Refills: 0 | Status: ACTIVE | OUTPATIENT
Start: 2023-05-30 | End: 2023-05-31

## 2023-05-30 RX ORDER — POSACONAZOLE 100 MG/1
300 TABLET, DELAYED RELEASE ORAL DAILY
Qty: 90 TABLET | Refills: 11 | Status: ACTIVE | OUTPATIENT
Start: 2023-05-30 | End: 2023-07-10

## 2023-05-30 NOTE — Clinical Note
Hey y'all, Mr. MYLES has newly diagnosed MDS EB2. He will need a transplant in CR1 - hopefully in the next 2-3 months. I am getting labs on him later this week if you don't mind adding HLA typing to his labs to get that process started?   I'm going to bring him back in 2-3 weeks to discuss stem cell transplant in further detail. Do y'all mind meeting with him then?   Thanks! Paco

## 2023-05-30 NOTE — PROGRESS NOTES
Section of Hematology and Stem Cell Transplantation  Follow Up Visit     Date of visit: 23  Visit diagnosis: Myelodysplastic syndrome [D46.9]  Referred by:  Harry Diamond MD    Oncologic History:     Primary Oncologic Diagnosis: Myelodysplastic syndrome    23: Initial evaluation by Dr. Diamond of anemia. WBC 2.71, Hgb 7.1, Plts  400. Prior to this visit, he was in [place] for a dental procedure. His symptoms of fatigue started after extractions. Workup by Dr. Diamond unremarkable.   23: Bone marrow biopsy revealed a hypercellular marrow (80-90%) with increased blasts (8-12%) concerning for MDS EB2. However, sample was limited.   23: Repeat bone marrow biopsy revealed myelodysplastic syndrome with excess blasts 2 (blasts 16-17%). CG/FISH/NGS pending.    History of Present Ilness:   Guillaume Salinas (Guillaume) is a pleasant 67 y.o.male with PVD, CAD, HTN who presents for follow up. He has felt well other than fatigue. He is eating and drinking fairly well.    Patient was seen today in conjunction with a .    PAST MEDICAL HISTORY:   Past Medical History:   Diagnosis Date    Anticoagulant long-term use     Coronary artery disease     Hypertension     Peripheral vascular disease, unspecified        PAST SURGICAL HISTORY:   Past Surgical History:   Procedure Laterality Date    BONE MARROW BIOPSY Left 2023    Procedure: Biopsy-bone marrow;  Surgeon: Hrary Diamond MD;  Location: [facility] OR;  Service: Oncology;  Laterality: Left;    COLONOSCOPY N/A 2022    Procedure: COLONOSCOPY Golytely Vaccinated will bring cards;  Surgeon: Dereje Simon MD;  Location: [facility] ENDO;  Service: Endoscopy;  Laterality: N/A;  Do not cancel this order       PAST SOCIAL HISTORY:  Social History     Tobacco Use    Smoking status: Former     Packs/day: 0.25     Years: 50.00     Pack years: 12.50     Types: Cigarettes     Quit date: 3/1/2023     Years since quittin.2    Smokeless  tobacco: Never    Tobacco comments:     pt does not want to quit   Substance Use Topics    Alcohol use: Not Currently    Drug use: Never       FAMILY HISTORY:  Family History   Problem Relation Age of Onset    Hypertension Mother     Cancer Father     Cancer Brother     No Known Problems Daughter     No Known Problems Daughter     No Known Problems Son        CURRENT MEDICATIONS:   Current Outpatient Medications   Medication Sig    aspirin (ECOTRIN) 81 MG EC tablet Take 1 tablet (81 mg total) by mouth once daily.    atorvastatin (LIPITOR) 80 MG tablet Take 1 tablet (80 mg total) by mouth once daily.    carvediloL (COREG) 6.25 MG tablet TAKE 1 TABLET(6.25 MG) BY MOUTH TWICE DAILY WITH MEALS    cilostazoL (PLETAL) 50 MG Tab Take 1 tablet (50 mg total) by mouth 2 (two) times daily.    gabapentin (NEURONTIN) 300 MG capsule Take 1 capsule (300 mg total) by mouth 3 (three) times daily.    zolpidem (AMBIEN) 10 mg Tab Take 10 mg by mouth nightly as needed.    acyclovir (ZOVIRAX) 400 MG tablet Take 1 tablet (400 mg total) by mouth 2 (two) times daily.    allopurinoL (ZYLOPRIM) 300 MG tablet Take 1 tablet (300 mg total) by mouth 2 (two) times daily.    levoFLOXacin (LEVAQUIN) 500 MG tablet Take 1 tablet (500 mg total) by mouth once daily.    posaconazole (NOXAFIL) 100 mg TbEC tablet Take 3 tablets (300 mg total) by mouth once daily.    posaconazole (NOXAFIL) 100 mg TbEC tablet Take 3 tablets (300 mg total) by mouth 2 (two) times daily. START WITH THIS DOSE FIRST then 300mg daily after for 2 doses    venetoclax (VENCLEXTA) 100 mg Tab Take 100 mg by mouth once daily Take 100mg daily for days 3 to 21 of a 28 day cycle (FOR CYCLE 1 ONLY).    [START ON 6/26/2023] venetoclax (VENCLEXTA) 100 mg Tab Take 100 mg by mouth once daily Take 100mg daily for 21 of 28 days each cycle.    venetoclax 10 mg Tab Take 10 mg by mouth once Take 10mg on day 1.    venetoclax 50 mg Tab Take 50 mg by mouth once Take 50mg on day 2.     No current  facility-administered medications for this visit.       ALLERGIES:   Review of patient's allergies indicates:  No Known Allergies    Review of Systems:     Pertinent positives and negatives included in the HPI. Otherwise a complete review of systems is negative.    Physical Exam:     Vitals:    05/30/23 0911   BP: (!) 115/59   Pulse: 73   Resp: 18   Temp: 98 °F (36.7 °C)     General: Appears well, NAD  HEENT: MMM, no OP lesions  Pulmonary: CTAB, no increased work of breathing, no W/R/C  Cardiovascular: S1S2 normal, RRR, no M/R/G  Abdominal: Soft, NT, ND, BS+, no HSM  Extremities: No C/C/E  Neurological: AAOx4, grossly normal, no focal deficits  Dermatologic: No appreciable rashes or lesions  Lymphatic: No cervical, axillary, or inguinal lymphadenopathy     ECOG Performance Status: (foot note - ECOG PS provided by [employer]cology [employer]) 1 - Symptomatic but completely ambulatory    Karnofsky Performance Score:  80%- Normal Activity with Effort: Some Symptoms of Disease    Labs:   Lab Results   Component Value Date    WBC 2.69 (L) 05/30/2023    RBC 2.41 (L) 05/30/2023    HGB 7.2 (L) 05/30/2023    HCT 21.9 (L) 05/30/2023    MCV 91 05/30/2023    MCH 29.9 05/30/2023    MCHC 32.9 05/30/2023    RDW 14.7 (H) 05/30/2023     (H) 05/30/2023    MPV 10.6 05/30/2023    GRAN 0.5 (L) 05/30/2023    GRAN 20.2 (L) 05/30/2023    LYMPH 1.8 05/30/2023    LYMPH 66.5 (H) 05/30/2023    MONO 0.2 (L) 05/30/2023    MONO 6.3 05/30/2023    EOS 0.1 05/30/2023    BASO 0.03 05/30/2023    EOSINOPHIL 3.3 05/30/2023    BASOPHIL 1.1 05/30/2023       CMP  Sodium   Date Value Ref Range Status   05/17/2023 139 136 - 145 mmol/L Final     Potassium   Date Value Ref Range Status   05/17/2023 4.1 3.5 - 5.1 mmol/L Final     Chloride   Date Value Ref Range Status   05/17/2023 106 95 - 110 mmol/L Final     CO2   Date Value Ref Range Status   05/17/2023 23 23 - 29 mmol/L Final     Glucose   Date Value Ref Range Status   05/17/2023 121 (H) 70 -  110 mg/dL Final     BUN   Date Value Ref Range Status   05/17/2023 18 8 - 23 mg/dL Final     Creatinine   Date Value Ref Range Status   05/17/2023 1.0 0.5 - 1.4 mg/dL Final     Calcium   Date Value Ref Range Status   05/17/2023 8.8 8.7 - 10.5 mg/dL Final     Total Protein   Date Value Ref Range Status   05/17/2023 7.1 6.0 - 8.4 g/dL Final     Albumin   Date Value Ref Range Status   05/17/2023 3.8 3.5 - 5.2 g/dL Final     Total Bilirubin   Date Value Ref Range Status   05/17/2023 0.4 0.1 - 1.0 mg/dL Final     Comment:     For infants and newborns, interpretation of results should be based  on gestational age, weight and in agreement with clinical  observations.    Premature Infant recommended reference ranges:  Up to 24 hours.............<8.0 mg/dL  Up to 48 hours............<12.0 mg/dL  3-5 days..................<15.0 mg/dL  6-29 days.................<15.0 mg/dL       Alkaline Phosphatase   Date Value Ref Range Status   05/17/2023 68 55 - 135 U/L Final     AST   Date Value Ref Range Status   05/17/2023 18 10 - 40 U/L Final     ALT   Date Value Ref Range Status   05/17/2023 27 10 - 44 U/L Final     Anion Gap   Date Value Ref Range Status   05/17/2023 10 8 - 16 mmol/L Final     eGFR if    Date Value Ref Range Status   08/27/2021 >60 >60 mL/min/1.73 m^2 Final   08/27/2021 >60 >60 mL/min/1.73 m^2 Final   08/27/2021 >60 >60 mL/min/1.73 m^2 Final     eGFR if non    Date Value Ref Range Status   08/27/2021 57 (A) >60 mL/min/1.73 m^2 Final     Comment:     Calculation used to obtain the estimated glomerular filtration  rate (eGFR) is the CKD-EPI equation.      08/27/2021 57 (A) >60 mL/min/1.73 m^2 Final     Comment:     Calculation used to obtain the estimated glomerular filtration  rate (eGFR) is the CKD-EPI equation.      08/27/2021 57 (A) >60 mL/min/1.73 m^2 Final     Comment:     Calculation used to obtain the estimated glomerular filtration  rate (eGFR) is the CKD-EPI equation.           Imaging:   No results found for this or any previous visit (from the past 2160 hour(s)).    Pathology:  Reviewed     Assessment and Plan:   Guillaume Salinas (Guillauem) is a pleasant 67 y.o.male with PVD, CAD, HTN who presents for follow up.    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 CG/FISH/NGS pending. I reviewed with him and his family the aggressive nature of this disease, including natural history, prognosis, and treatment options. I recommended treatment with azacitidine 75 mg/m2 daily x7 days plus venetoclax 100mg (posa) daily x21 of 28 days. He was in agreement. Consent signed today.   Azacitidine plus venetoclax x21 of 28 days as above. Taz ramp up ordered.  Repeat bone marrow biopsy at the end of cycle 1. Orders placed.    Stem cell transplant candidate: We briefly discussed allogeneic stem cell transplantation in MDS. He will need transplant in CR1. Will plan for further discussion of stem cell transplant and meeting with transplant coordinators at next follow up in 2-3 weeks.   Will send HLA typing with next labs.    Symptomatic anemia: Related to MDS. Will request twice weekly monitoring with Dr. Diamond. Transfuse for Hgb <7.    Thrombocytopenia: Related to MDS. Monitor and transfuse for Plts <10.    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     At high risk for tumor lysis syndrome: Will start allopurinol 300mg BID.    Orders/Follow Up:      Orders Placed This Encounter    Bone marrow    Leukemia/Lymphoma Screen - Bone Marrow Left Posterior Iliac Crest    Myelodysplastic Syndrome (MDS), FISH, Bone Marrow    OncoHeme (NGS) Hematologic Neoplasms, BM Diagnosis or Indication for test: Myelodysplastic syndrome    Chromosome Analysis, Bone Marrow Left Posterior Iliac Crest    Specimen to Pathology, Bone Marrow Aspiration/Biopsy    acyclovir (ZOVIRAX) 400 MG tablet    allopurinoL (ZYLOPRIM) 300 MG tablet     levoFLOXacin (LEVAQUIN) 500 MG tablet    posaconazole (NOXAFIL) 100 mg TbEC tablet    posaconazole (NOXAFIL) 100 mg TbEC tablet    venetoclax 10 mg Tab    venetoclax 50 mg Tab    venetoclax (VENCLEXTA) 100 mg Tab    venetoclax (VENCLEXTA) 100 mg Tab       Route Chart for Scheduling    BMT Chart Routing  Urgent    Follow up with physician 3 weeks. RTC 2-3 weeks - one hour visit. Repeat bone marrow biopsy in 5 weeks (week 4 of treatment).   Follow up with [facility]    Provider visit type    Infusion scheduling note    Injection scheduling note Please schedule azacitidine daily x7 days in [place] starting ASAP. Please schedule cycles 2-4 (every 28 days)   Labs CBC, CMP, phosphorus, magnesium and type and screen   Scheduling:  Preferred lab:  Lab interval:  Please arrange labs Thursday in [place] (CBC, CMP, Mg, Phos, T&S, HLA)   Imaging None      Pharmacy appointment No pharmacy appointment needed      Other referrals no Refer to Oncology Primary Care -  No additional referrals needed         Treatment Plan Information   OP AZACITIDINE 7-DAY (SUB-Q)   Harry Diamond MD   Upcoming Treatment Dates - OP AZACITIDINE 7-DAY (SUB-Q)    5/29/2023       Pre-Medications       ondansetron tablet 16 mg       Chemotherapy       azaCITIDine (VIDAZA) chemo injection 140 mg  5/30/2023       Pre-Medications       ONDANSETRON HCL  4 MG ORAL TAB       Chemotherapy       azaCITIDine (VIDAZA) chemo injection 140 mg  5/31/2023       Pre-Medications       ONDANSETRON HCL  4 MG ORAL TAB       Chemotherapy       azaCITIDine (VIDAZA) chemo injection 140 mg  6/1/2023       Pre-Medications       ONDANSETRON HCL  4 MG ORAL TAB       Chemotherapy       azaCITIDine (VIDAZA) chemo injection 140 mg    Advance Care Planning   Date: 05/08/2023  We reviewed his underlying diagnosis including natural history, prognosis, and various treatment options. He remains interested in pursuing any and all treatment options in an effort to improve his quality and quantity  of life. Will discuss treatment options pending biopsy results.        Total time of this visit was 65 minutes, including time spent face to face with patient, and also in preparing for today's visit for MDM and documentation. (Medical Decision Making, including consideration of possible diagnoses, management options, complex medical record review, review of diagnostic tests and information, consideration and discussion of significant complications based on comorbidities, and discussion with providers involved with the care of the patient). Greater than 50% was spent face to face with the patient counseling and coordinating care.    Paco Hickey MD  Hematology, Oncology, and Stem Cell Transplantation  [facility]

## 2023-05-31 DIAGNOSIS — Z76.82 STEM CELL TRANSPLANT CANDIDATE: Primary | ICD-10-CM

## 2023-05-31 DIAGNOSIS — D46.9 MYELODYSPLASTIC SYNDROME: ICD-10-CM

## 2023-06-01 ENCOUNTER — PATIENT MESSAGE (OUTPATIENT)
Dept: HEMATOLOGY/ONCOLOGY | Facility: CLINIC | Age: 68
End: 2023-06-01
Payer: MEDICARE

## 2023-06-01 ENCOUNTER — TELEPHONE (OUTPATIENT)
Dept: INFUSION THERAPY | Facility: HOSPITAL | Age: 68
End: 2023-06-01
Payer: MEDICARE

## 2023-06-01 ENCOUNTER — TELEPHONE (OUTPATIENT)
Dept: PHARMACY | Facility: CLINIC | Age: 68
End: 2023-06-01
Payer: MEDICARE

## 2023-06-01 DIAGNOSIS — D46.9 MYELODYSPLASTIC SYNDROME: Primary | ICD-10-CM

## 2023-06-01 NOTE — TELEPHONE ENCOUNTER
Using language line ( # [id number] ) Spoke with pt to schedule Vidaza injections ordered by Dr. Hickey. Pt scheduled-   June 12th at 8:30,  June 13th at 11,  June 14th at 10,  June 15th at 9:30,  June 16th at 9:30,   June 19th at 10,   June 20th at 11:30.  Pt verbalized understanding. Pt had no further questions at this time.

## 2023-06-01 NOTE — TELEPHONE ENCOUNTER
Pantera, this is Lennie Fletcher, clinical pharmacist with [facility] that is part of your care team.  We have begun working on your prescription that your doctor has sent us. Our next steps include:     Working with your insurance company to obtain approval for your medication  Working with you to ensure your medication is affordable     We will be calling you along the way with updates on your medication but if you have any concerns or receive information that you would like to discuss please reach us at [phone].    Welcome call outcome: Patient/caregiver reached     Willian (#[id number])

## 2023-06-02 ENCOUNTER — LAB VISIT (OUTPATIENT)
Dept: LAB | Facility: HOSPITAL | Age: 68
End: 2023-06-02
Attending: INTERNAL MEDICINE
Payer: MEDICARE

## 2023-06-02 DIAGNOSIS — D64.9 SYMPTOMATIC ANEMIA: ICD-10-CM

## 2023-06-02 LAB
ABO + RH BLD: NORMAL
ALBUMIN SERPL BCP-MCNC: 3.8 G/DL (ref 3.5–5.2)
ALP SERPL-CCNC: 60 U/L (ref 55–135)
ALT SERPL W/O P-5'-P-CCNC: 35 U/L (ref 10–44)
ANION GAP SERPL CALC-SCNC: 10 MMOL/L (ref 8–16)
ANISOCYTOSIS BLD QL SMEAR: SLIGHT
AST SERPL-CCNC: 20 U/L (ref 10–40)
BASOPHILS NFR BLD: 0 % (ref 0–1.9)
BILIRUB SERPL-MCNC: 0.4 MG/DL (ref 0.1–1)
BLD GP AB SCN CELLS X3 SERPL QL: NORMAL
BUN SERPL-MCNC: 22 MG/DL (ref 8–23)
CALCIUM SERPL-MCNC: 9.1 MG/DL (ref 8.7–10.5)
CHLORIDE SERPL-SCNC: 105 MMOL/L (ref 95–110)
CHROM BANDING METHOD: NORMAL
CHROMOSOME ANALYSIS BM ADDITIONAL INFORMATION: NORMAL
CHROMOSOME ANALYSIS BM RELEASED BY: NORMAL
CHROMOSOME ANALYSIS BM RESULT SUMMARY: NORMAL
CLINICAL CYTOGENETICIST REVIEW: NORMAL
CLINICAL CYTOGENETICIST REVIEW: NORMAL
CO2 SERPL-SCNC: 24 MMOL/L (ref 23–29)
CREAT SERPL-MCNC: 1.2 MG/DL (ref 0.5–1.4)
DIFFERENTIAL METHOD: ABNORMAL
EOSINOPHIL NFR BLD: 4 % (ref 0–8)
ERYTHROCYTE [DISTWIDTH] IN BLOOD BY AUTOMATED COUNT: 14.8 % (ref 11.5–14.5)
EST. GFR  (NO RACE VARIABLE): >60 ML/MIN/1.73 M^2
FMDS SPECIMEN: NORMAL
GIANT PLATELETS BLD QL SMEAR: PRESENT
GLUCOSE SERPL-MCNC: 140 MG/DL (ref 70–110)
HCT VFR BLD AUTO: 21.5 % (ref 40–54)
HGB BLD-MCNC: 7 G/DL (ref 14–18)
HYPOCHROMIA BLD QL SMEAR: ABNORMAL
IMM GRANULOCYTES # BLD AUTO: ABNORMAL K/UL (ref 0–0.04)
IMM GRANULOCYTES NFR BLD AUTO: ABNORMAL % (ref 0–0.5)
KARYOTYP MAR: NORMAL
LDH SERPL L TO P-CCNC: 113 U/L (ref 110–260)
LYMPHOCYTES NFR BLD: 66 % (ref 18–48)
MCH RBC QN AUTO: 29.8 PG (ref 27–31)
MCHC RBC AUTO-ENTMCNC: 32.6 G/DL (ref 32–36)
MCV RBC AUTO: 92 FL (ref 82–98)
MDS FISH ADDITIONAL INFORMATION: NORMAL
MDS FISH DISCLAIMER: NORMAL
MDS FISH REASON FOR REFERRAL (BM): NORMAL
MDS FISH RELEASED BY: NORMAL
MDS FISH RESULT (BM): NORMAL
MDS FISH RESULT TABLE: NORMAL
MOL DX INTERP BLD/T QL: NORMAL
MONOCYTES NFR BLD: 9 % (ref 4–15)
NEUTROPHILS NFR BLD: 21 % (ref 38–73)
NRBC BLD-RTO: 0 /100 WBC
PLATELET # BLD AUTO: 562 K/UL (ref 150–450)
PLATELET BLD QL SMEAR: ABNORMAL
PMV BLD AUTO: 10.6 FL (ref 9.2–12.9)
POLYCHROMASIA BLD QL SMEAR: ABNORMAL
POTASSIUM SERPL-SCNC: 4.2 MMOL/L (ref 3.5–5.1)
PROT SERPL-MCNC: 7.1 G/DL (ref 6–8.4)
RBC # BLD AUTO: 2.35 M/UL (ref 4.6–6.2)
REASON FOR REFERRAL (NARRATIVE): NORMAL
REF LAB TEST METHOD: NORMAL
REF LAB TEST METHOD: NORMAL
SODIUM SERPL-SCNC: 139 MMOL/L (ref 136–145)
SPECIMEN OUTDATE: NORMAL
SPECIMEN SOURCE: NORMAL
SPECIMEN SOURCE: NORMAL
SPECIMEN: NORMAL
URATE SERPL-MCNC: 5 MG/DL (ref 3.4–7)
WBC # BLD AUTO: 2.78 K/UL (ref 3.9–12.7)

## 2023-06-02 PROCEDURE — 86900 BLOOD TYPING SEROLOGIC ABO: CPT | Performed by: INTERNAL MEDICINE

## 2023-06-02 PROCEDURE — 36415 COLL VENOUS BLD VENIPUNCTURE: CPT | Performed by: INTERNAL MEDICINE

## 2023-06-02 PROCEDURE — 85025 COMPLETE CBC W/AUTO DIFF WBC: CPT | Performed by: INTERNAL MEDICINE

## 2023-06-02 PROCEDURE — 80053 COMPREHEN METABOLIC PANEL: CPT | Performed by: INTERNAL MEDICINE

## 2023-06-02 PROCEDURE — 83615 LACTATE (LD) (LDH) ENZYME: CPT | Performed by: INTERNAL MEDICINE

## 2023-06-02 PROCEDURE — 84550 ASSAY OF BLOOD/URIC ACID: CPT | Performed by: INTERNAL MEDICINE

## 2023-06-06 ENCOUNTER — TELEPHONE (OUTPATIENT)
Dept: PHARMACY | Facility: CLINIC | Age: 68
End: 2023-06-06
Payer: MEDICARE

## 2023-06-06 ENCOUNTER — PATIENT MESSAGE (OUTPATIENT)
Dept: PHARMACY | Facility: CLINIC | Age: 68
End: 2023-06-06
Payer: MEDICARE

## 2023-06-06 NOTE — TELEPHONE ENCOUNTER
----- Message from Azra Bunch sent at 6/5/2023  4:28 PM CDT -----  Regarding: FW: Posaconazole (Noxafil) referral  Hello,     Thank you for submitting a referral to the Pharmacy Patient Assistance Team. We have received your referral for Guillaume Salinas. This patient case has been assigned to GUSTAVO BACK, who will review the case and contact the patient with assistance options. You may review progress in the patient's chart through Telephone Encounter notes from Pharmacy Patient Assistance.       Thank you,     Pharmacy Patient Assistance Team   ----- Message -----  From: Terese Guzman PharmD  Sent: 6/5/2023   1:14 PM CDT  To: Pharmacy Patient Assistance Team  Subject: Posaconazole (Noxafil) referral                  Good Afternoon    referring this patient for help with Noxafil Assistance. Copay at pharmacy is $1,520.72. Patient is in coverage gap    Thank you!  Terese Guzman, PharmD  [facility] - Transplant   [phone]  Ext [phone]

## 2023-06-06 NOTE — TELEPHONE ENCOUNTER
I have spoken with Guillaume Salinas and informed him of the [employer] application process for Noxafil and what's required to apply.  Guillaume Salinas will provide the following documents: Proof of household Income( such as social security statement, 1099 form, pension statement or 3 consecutive pay stubs, Copy of all Insurance cards( front and back), and Signed & dated [employer]/ Patient Authorization Forms      I will follow up with the patient in 5 business days.

## 2023-06-07 ENCOUNTER — DOCUMENTATION ONLY (OUTPATIENT)
Dept: HEMATOLOGY/ONCOLOGY | Facility: CLINIC | Age: 68
End: 2023-06-07
Payer: MEDICARE

## 2023-06-07 ENCOUNTER — TELEPHONE (OUTPATIENT)
Dept: HEMATOLOGY/ONCOLOGY | Facility: CLINIC | Age: 68
End: 2023-06-07
Payer: MEDICARE

## 2023-06-07 ENCOUNTER — LAB VISIT (OUTPATIENT)
Dept: LAB | Facility: HOSPITAL | Age: 68
End: 2023-06-07
Attending: INTERNAL MEDICINE
Payer: MEDICARE

## 2023-06-07 DIAGNOSIS — D64.9 SYMPTOMATIC ANEMIA: ICD-10-CM

## 2023-06-07 DIAGNOSIS — Z76.82 STEM CELL TRANSPLANT CANDIDATE: ICD-10-CM

## 2023-06-07 DIAGNOSIS — D46.9 MYELODYSPLASTIC SYNDROME: ICD-10-CM

## 2023-06-07 DIAGNOSIS — D64.9 SYMPTOMATIC ANEMIA: Primary | ICD-10-CM

## 2023-06-07 DIAGNOSIS — D64.9 ANEMIA, UNSPECIFIED TYPE: ICD-10-CM

## 2023-06-07 DIAGNOSIS — Z12.5 ENCOUNTER FOR SCREENING FOR MALIGNANT NEOPLASM OF PROSTATE: ICD-10-CM

## 2023-06-07 LAB
ABO + RH BLD: NORMAL
ALBUMIN SERPL BCP-MCNC: 3.8 G/DL (ref 3.5–5.2)
ALP SERPL-CCNC: 59 U/L (ref 55–135)
ALT SERPL W/O P-5'-P-CCNC: 31 U/L (ref 10–44)
ANION GAP SERPL CALC-SCNC: 9 MMOL/L (ref 8–16)
ANISOCYTOSIS BLD QL SMEAR: SLIGHT
ANISOCYTOSIS BLD QL SMEAR: SLIGHT
AST SERPL-CCNC: 18 U/L (ref 10–40)
BASO STIPL BLD QL SMEAR: ABNORMAL
BASO STIPL BLD QL SMEAR: ABNORMAL
BASOPHILS NFR BLD: 2 % (ref 0–1.9)
BASOPHILS NFR BLD: 2 % (ref 0–1.9)
BILIRUB SERPL-MCNC: 0.4 MG/DL (ref 0.1–1)
BLD GP AB SCN CELLS X3 SERPL QL: NORMAL
BUN SERPL-MCNC: 17 MG/DL (ref 8–23)
CALCIUM SERPL-MCNC: 9.1 MG/DL (ref 8.7–10.5)
CHLORIDE SERPL-SCNC: 107 MMOL/L (ref 95–110)
CO2 SERPL-SCNC: 23 MMOL/L (ref 23–29)
COMPLEXED PSA SERPL-MCNC: 1.4 NG/ML (ref 0–4)
CREAT SERPL-MCNC: 1.2 MG/DL (ref 0.5–1.4)
DACRYOCYTES BLD QL SMEAR: ABNORMAL
DACRYOCYTES BLD QL SMEAR: ABNORMAL
DIFFERENTIAL METHOD: ABNORMAL
DIFFERENTIAL METHOD: ABNORMAL
EOSINOPHIL NFR BLD: 4 % (ref 0–8)
EOSINOPHIL NFR BLD: 4 % (ref 0–8)
ERYTHROCYTE [DISTWIDTH] IN BLOOD BY AUTOMATED COUNT: 14.6 % (ref 11.5–14.5)
ERYTHROCYTE [DISTWIDTH] IN BLOOD BY AUTOMATED COUNT: 14.6 % (ref 11.5–14.5)
EST. GFR  (NO RACE VARIABLE): >60 ML/MIN/1.73 M^2
GIANT PLATELETS BLD QL SMEAR: PRESENT
GIANT PLATELETS BLD QL SMEAR: PRESENT
GLUCOSE SERPL-MCNC: 132 MG/DL (ref 70–110)
HCT VFR BLD AUTO: 19.2 % (ref 40–54)
HCT VFR BLD AUTO: 19.2 % (ref 40–54)
HGB BLD-MCNC: 6.3 G/DL (ref 14–18)
HGB BLD-MCNC: 6.3 G/DL (ref 14–18)
HYPOCHROMIA BLD QL SMEAR: ABNORMAL
HYPOCHROMIA BLD QL SMEAR: ABNORMAL
IMM GRANULOCYTES # BLD AUTO: ABNORMAL K/UL (ref 0–0.04)
IMM GRANULOCYTES # BLD AUTO: ABNORMAL K/UL (ref 0–0.04)
IMM GRANULOCYTES NFR BLD AUTO: ABNORMAL % (ref 0–0.5)
IMM GRANULOCYTES NFR BLD AUTO: ABNORMAL % (ref 0–0.5)
LYMPHOCYTES NFR BLD: 58 % (ref 18–48)
LYMPHOCYTES NFR BLD: 58 % (ref 18–48)
MAGNESIUM SERPL-MCNC: 2.1 MG/DL (ref 1.6–2.6)
MCH RBC QN AUTO: 29.9 PG (ref 27–31)
MCH RBC QN AUTO: 29.9 PG (ref 27–31)
MCHC RBC AUTO-ENTMCNC: 32.8 G/DL (ref 32–36)
MCHC RBC AUTO-ENTMCNC: 32.8 G/DL (ref 32–36)
MCV RBC AUTO: 91 FL (ref 82–98)
MCV RBC AUTO: 91 FL (ref 82–98)
METAMYELOCYTES NFR BLD MANUAL: 1 %
METAMYELOCYTES NFR BLD MANUAL: 1 %
MONOCYTES NFR BLD: 4 % (ref 4–15)
MONOCYTES NFR BLD: 4 % (ref 4–15)
NEUTROPHILS NFR BLD: 27 % (ref 38–73)
NEUTROPHILS NFR BLD: 27 % (ref 38–73)
NEUTS BAND NFR BLD MANUAL: 4 %
NEUTS BAND NFR BLD MANUAL: 4 %
NRBC BLD-RTO: 0 /100 WBC
NRBC BLD-RTO: 0 /100 WBC
PHOSPHATE SERPL-MCNC: 3.6 MG/DL (ref 2.7–4.5)
PLATELET # BLD AUTO: 533 K/UL (ref 150–450)
PLATELET # BLD AUTO: 533 K/UL (ref 150–450)
PLATELET BLD QL SMEAR: ABNORMAL
PLATELET BLD QL SMEAR: ABNORMAL
PMV BLD AUTO: 10.7 FL (ref 9.2–12.9)
PMV BLD AUTO: 10.7 FL (ref 9.2–12.9)
POIKILOCYTOSIS BLD QL SMEAR: SLIGHT
POIKILOCYTOSIS BLD QL SMEAR: SLIGHT
POLYCHROMASIA BLD QL SMEAR: ABNORMAL
POLYCHROMASIA BLD QL SMEAR: ABNORMAL
POTASSIUM SERPL-SCNC: 4.1 MMOL/L (ref 3.5–5.1)
PROT SERPL-MCNC: 7.1 G/DL (ref 6–8.4)
RBC # BLD AUTO: 2.11 M/UL (ref 4.6–6.2)
RBC # BLD AUTO: 2.11 M/UL (ref 4.6–6.2)
SCHISTOCYTES BLD QL SMEAR: ABNORMAL
SCHISTOCYTES BLD QL SMEAR: ABNORMAL
SCHISTOCYTES BLD QL SMEAR: PRESENT
SCHISTOCYTES BLD QL SMEAR: PRESENT
SODIUM SERPL-SCNC: 139 MMOL/L (ref 136–145)
SPECIMEN OUTDATE: NORMAL
WBC # BLD AUTO: 2.63 K/UL (ref 3.9–12.7)
WBC # BLD AUTO: 2.63 K/UL (ref 3.9–12.7)
WBC TOXIC VACUOLES BLD QL SMEAR: PRESENT
WBC TOXIC VACUOLES BLD QL SMEAR: PRESENT

## 2023-06-07 PROCEDURE — 84100 ASSAY OF PHOSPHORUS: CPT | Performed by: INTERNAL MEDICINE

## 2023-06-07 PROCEDURE — 81382 HLA II TYPING 1 LOC HR: CPT | Mod: 91 | Performed by: INTERNAL MEDICINE

## 2023-06-07 PROCEDURE — 81380 HLA I TYPING 1 LOCUS HR: CPT | Mod: 91 | Performed by: INTERNAL MEDICINE

## 2023-06-07 PROCEDURE — 83735 ASSAY OF MAGNESIUM: CPT | Performed by: INTERNAL MEDICINE

## 2023-06-07 PROCEDURE — 81380 HLA I TYPING 1 LOCUS HR: CPT | Performed by: INTERNAL MEDICINE

## 2023-06-07 PROCEDURE — 86920 COMPATIBILITY TEST SPIN: CPT | Mod: 59 | Performed by: INTERNAL MEDICINE

## 2023-06-07 PROCEDURE — 80053 COMPREHEN METABOLIC PANEL: CPT | Performed by: INTERNAL MEDICINE

## 2023-06-07 PROCEDURE — 81382 HLA II TYPING 1 LOC HR: CPT | Performed by: INTERNAL MEDICINE

## 2023-06-07 PROCEDURE — 84153 ASSAY OF PSA TOTAL: CPT | Performed by: INTERNAL MEDICINE

## 2023-06-07 PROCEDURE — 85007 BL SMEAR W/DIFF WBC COUNT: CPT | Performed by: INTERNAL MEDICINE

## 2023-06-07 PROCEDURE — 36415 COLL VENOUS BLD VENIPUNCTURE: CPT | Performed by: INTERNAL MEDICINE

## 2023-06-07 PROCEDURE — 85027 COMPLETE CBC AUTOMATED: CPT | Performed by: INTERNAL MEDICINE

## 2023-06-07 PROCEDURE — 86900 BLOOD TYPING SEROLOGIC ABO: CPT | Performed by: INTERNAL MEDICINE

## 2023-06-07 RX ORDER — HYDROCODONE BITARTRATE AND ACETAMINOPHEN 500; 5 MG/1; MG/1
TABLET ORAL ONCE
Status: CANCELLED | OUTPATIENT
Start: 2023-06-07 | End: 2023-06-07

## 2023-06-07 RX ORDER — DIPHENHYDRAMINE HCL 25 MG
25 CAPSULE ORAL
Status: CANCELLED | OUTPATIENT
Start: 2023-06-07

## 2023-06-07 RX ORDER — ACETAMINOPHEN 325 MG/1
650 TABLET ORAL
Status: CANCELLED | OUTPATIENT
Start: 2023-06-07

## 2023-06-08 ENCOUNTER — SPECIALTY PHARMACY (OUTPATIENT)
Dept: PHARMACY | Facility: CLINIC | Age: 68
End: 2023-06-08
Payer: MEDICARE

## 2023-06-08 ENCOUNTER — INFUSION (OUTPATIENT)
Dept: INFUSION THERAPY | Facility: HOSPITAL | Age: 68
End: 2023-06-08
Attending: INTERNAL MEDICINE
Payer: MEDICARE

## 2023-06-08 ENCOUNTER — PATIENT MESSAGE (OUTPATIENT)
Dept: HEMATOLOGY/ONCOLOGY | Facility: CLINIC | Age: 68
End: 2023-06-08
Payer: MEDICARE

## 2023-06-08 VITALS
TEMPERATURE: 98 F | SYSTOLIC BLOOD PRESSURE: 107 MMHG | HEART RATE: 67 BPM | BODY MASS INDEX: 24.08 KG/M2 | OXYGEN SATURATION: 100 % | RESPIRATION RATE: 18 BRPM | WEIGHT: 162.56 LBS | HEIGHT: 69 IN | DIASTOLIC BLOOD PRESSURE: 59 MMHG

## 2023-06-08 DIAGNOSIS — D64.9 SYMPTOMATIC ANEMIA: ICD-10-CM

## 2023-06-08 LAB
BLD PROD TYP BPU: NORMAL
BLD PROD TYP BPU: NORMAL
BLOOD UNIT EXPIRATION DATE: NORMAL
BLOOD UNIT EXPIRATION DATE: NORMAL
BLOOD UNIT TYPE CODE: 7300
BLOOD UNIT TYPE CODE: 7300
BLOOD UNIT TYPE: NORMAL
BLOOD UNIT TYPE: NORMAL
CODING SYSTEM: NORMAL
CODING SYSTEM: NORMAL
CROSSMATCH INTERPRETATION: NORMAL
CROSSMATCH INTERPRETATION: NORMAL
DISPENSE STATUS: NORMAL
DISPENSE STATUS: NORMAL
NUM UNITS TRANS PACKED RBC: NORMAL
NUM UNITS TRANS PACKED RBC: NORMAL

## 2023-06-08 PROCEDURE — P9040 RBC LEUKOREDUCED IRRADIATED: HCPCS | Performed by: INTERNAL MEDICINE

## 2023-06-08 PROCEDURE — 36430 TRANSFUSION BLD/BLD COMPNT: CPT

## 2023-06-08 PROCEDURE — 25000003 PHARM REV CODE 250: Performed by: INTERNAL MEDICINE

## 2023-06-08 RX ORDER — HYDROCODONE BITARTRATE AND ACETAMINOPHEN 500; 5 MG/1; MG/1
TABLET ORAL ONCE
Status: COMPLETED | OUTPATIENT
Start: 2023-06-08 | End: 2023-06-08

## 2023-06-08 RX ORDER — DIPHENHYDRAMINE HCL 25 MG
25 CAPSULE ORAL
Status: DISCONTINUED | OUTPATIENT
Start: 2023-06-08 | End: 2023-06-08 | Stop reason: HOSPADM

## 2023-06-08 RX ORDER — ACETAMINOPHEN 325 MG/1
650 TABLET ORAL
Status: DISCONTINUED | OUTPATIENT
Start: 2023-06-08 | End: 2023-06-08 | Stop reason: HOSPADM

## 2023-06-08 RX ADMIN — SODIUM CHLORIDE: 9 INJECTION, SOLUTION INTRAVENOUS at 09:06

## 2023-06-08 NOTE — NURSING
Pt received 2 units PRBCs through PIV without difficulties. Blood consent in chart. S/S of transfusion reaction discussed with patient. Pt verbalized understanding.  Vital signs remain stable, titrated per protocol. Pt tolerated it well. AVS given. Pt will follow up with MD. Discharged with no acute distress.

## 2023-06-08 NOTE — TELEPHONE ENCOUNTER
Specialty Pharmacy - Initial Clinical Assessment    Specialty Medication Orders Linked to Encounter      Flowsheet Row Most Recent Value   Medication #1 venetoclax (VENCLEXTA) 10 mg Tab (Order#[id number], Rx#[id number])   Medication #2 venetoclax (VENCLEXTA) 100 mg Tab (Order#[id number], Rx#[id number])   Medication #3 venetoclax (VENCLEXTA) 50 mg Tab (Order#[id number], Rx#[id number])          Patient Diagnosis   D46.9 - Myelodysplastic syndrome    Subjective    Guillaume Salinas is a 67 y.o. male, who is followed by the specialty pharmacy service for management and education.    Recent Encounters       Date Type Provider Description    06/08/2023 Specialty Pharmacy Lennie Fletcher, Adrien Initial Clinical Assessment    06/08/2023 Specialty Pharmacy Lennie Fletcher, Adrien Referral Authorization            Current Outpatient Medications   Medication Sig    acyclovir (ZOVIRAX) 400 MG tablet Take 1 tablet (400 mg total) by mouth 2 (two) times daily.    allopurinoL (ZYLOPRIM) 300 MG tablet Take 1 tablet (300 mg total) by mouth 2 (two) times daily.    aspirin (ECOTRIN) 81 MG EC tablet Take 1 tablet (81 mg total) by mouth once daily.    atorvastatin (LIPITOR) 80 MG tablet Take 1 tablet (80 mg total) by mouth once daily.    carvediloL (COREG) 6.25 MG tablet TAKE 1 TABLET(6.25 MG) BY MOUTH TWICE DAILY WITH MEALS    cilostazoL (PLETAL) 50 MG Tab Take 1 tablet (50 mg total) by mouth 2 (two) times daily.    gabapentin (NEURONTIN) 300 MG capsule Take 1 capsule (300 mg total) by mouth 3 (three) times daily.    levoFLOXacin (LEVAQUIN) 500 MG tablet Take 1 tablet (500 mg total) by mouth once daily.    posaconazole (NOXAFIL) 100 mg TbEC tablet Take 3 tablets (300 mg total) by mouth once daily.    venetoclax (VENCLEXTA) 10 mg Tab Take 1 tablet (10 mg) by mouth of day 1 of 28 day cycle. Discard remaining tablet.    venetoclax (VENCLEXTA) 100 mg Tab Take 1 tablet (100 mg) by mouth once daily on days 3-14 of a 28-day cycle  (FOR CYCLE 1 ONLY). Do not discard any remaining tablets.    venetoclax (VENCLEXTA) 100 mg Tab Take 1 tablet (100 mg) by mouth once daily on days 1-14 of a 28-day cycle. Do not discard any remaining tablets.    venetoclax (VENCLEXTA) 50 mg Tab Take 1 tablet (50 mg total) by mouth once on day 2 of 28 day cycle.    zolpidem (AMBIEN) 10 mg Tab Take 10 mg by mouth nightly as needed.   Last reviewed on 6/8/2023  3:28 PM by Lennie Martinez, PharmD    Review of patient's allergies indicates:  No Known AllergiesLast reviewed on  6/8/2023 9:06 AM by Windy Garcia    Drug Interactions    Drug interactions evaluated: yes  Clinically relevant drug interactions identified: yes   Interactions list: Posaconazole/carvedilol x venclexta (c) increased levels of venclexta     Drug management plan: Posaconazole/carvedilol x venclexta (c) increased levels of venclexta - venclexta dose reduced appropriately             Adverse Effects    *All other systems reviewed and are negative       Assessment Questions - Documented Responses      Flowsheet Row Most Recent Value   Assessment    Medication Reconciliation completed for patient Yes   During the past 4 weeks, has patient missed any activities due to condition or medication? No   During the past 4 weeks, did patient have any of the following urgent care visits? None   Goals of Therapy Status Discussed (new start)   Status of the patients ability to self-administer: Is Able   All education points have been covered with patient? Yes, supplemental printed education provided   Welcome packet contents reviewed and discussed with patient? Yes   Assesment completed? Yes   Plan Therapy being initiated   Do you need to open a clinical intervention (i-vent)? No   Do you want to schedule first shipment? Yes   Medication #1 Assessment Info    Patient status New medication, New to [facility]   Is this medication appropriate for the patient? Yes   Is this medication effective? Not yet started  "  Medication #2 Assessment Info    Patient status New medication, New to O[facility]   Is this medication appropriate for the patient? Yes   Is this medication effective? Not yet started   Medication #3 Assessment Info    Patient status New medication, New to [facility]   Is this medication appropriate for the patient? Yes   Is this medication effective? Not yet started          Refill Questions - Documented Responses      Flowsheet Row Most Recent Value   Patient Availability and HIPAA Verification    Does patient want to proceed with activity? Yes   HIPAA/medical authority confirmed? Yes   Relationship to patient of person spoken to? Self   Refill Screening Questions    When does the patient need to receive the medication? 06/12/23   Refill Delivery Questions    How will the patient receive the medication? Pickup   When does the patient need to receive the medication? 06/12/23   Shipping Address Home   Address in [facility] confirmed and updated if neccessary? Yes   Expected Copay ($) 945.46   Is the patient able to afford the medication copay? Yes   Payment Method CC on file   Days supply of Refill 56   Supplies needed? No supplies needed   Refill activity completed? Yes   Refill activity plan Refill scheduled   Shipment/Pickup Date: 06/09/23            Objective    He has a past medical history of Anticoagulant long-term use, Coronary artery disease, Hypertension, and Peripheral vascular disease, unspecified.    Tried/failed medications: N/A    BP Readings from Last 4 Encounters:   06/08/23 (!) 107/59   05/30/23 (!) 115/59   05/23/23 135/80   05/18/23 118/64     Ht Readings from Last 4 Encounters:   06/08/23 5' 9" (1.753 m)   05/30/23 5' 9" (1.753 m)   05/18/23 5' 9" (1.753 m)   05/15/23 5' 9" (1.753 m)     Wt Readings from Last 4 Encounters:   06/08/23 73.8 kg (162 lb 9.4 oz)   05/30/23 73.8 kg (162 lb 9.4 oz)   05/18/23 72.4 kg (159 lb 9.8 oz)   05/15/23 72.4 kg (159 lb 9.8 oz)     Recent Labs   Lab Result Units " 06/07/23  0809 06/02/23  0824 05/30/23  0758 05/23/23  0927 05/17/23  0917 05/03/23  0910 04/17/23  0842   RBC M/uL 2.11 L  2.11 L 2.35 L 2.41 L 2.68 L 2.30 L   < > 2.10 L   Hemoglobin g/dL 6.3 L  6.3 L 7.0 L 7.2 L 7.9 L 6.9 L   < > 6.7 L   Hematocrit % 19.2 LL  19.2 LL 21.5 L 21.9 L 25.1 L 21.1 L   < > 20.8 L   WBC K/uL 2.63 L  2.63 L 2.78 L 2.69 L 2.85 L 2.49 L   < > 3.26 L   Gran # (ANC) K/uL  --   --  0.5 L 0.9 L 0.6 L   < > 1.0 L   Gran % % 27.0 L  27.0 L 21.0 L 20.2 L 31.2 L 25.0 L   < > 29.5 L   Platelets K/uL 533 H  533 H 562 H 556 H 554 H 541 H   < > 416   Sodium mmol/L 139 139  --   --  139  --  141   Potassium mmol/L 4.1 4.2  --   --  4.1  --  4.5   Chloride mmol/L 107 105  --   --  106  --  109   Glucose mg/dL 132 H 140 H  --   --  121 H  --  106   BUN mg/dL 17 22  --   --  18  --  22   Creatinine mg/dL 1.2 1.2  --   --  1.0  --  1.2   Calcium mg/dL 9.1 9.1  --   --  8.8  --  8.7   Total Protein g/dL 7.1 7.1  --   --  7.1  --  6.8   Albumin g/dL 3.8 3.8  --   --  3.8  --  3.5   Total Bilirubin mg/dL 0.4 0.4  --   --  0.4  --  0.3   Alkaline Phosphatase U/L 59 60  --   --  68  --  67   AST U/L 18 20  --   --  18  --  14   ALT U/L 31 35  --   --  27  --  20    < > = values in this interval not displayed.     The goals of cancer treatment include:  Achieving remission of cancer, if possible  Reducing tumor size and spread of cancer, if remission is not possible  Minimizing pain and symptoms of the cancer  Preventing infection and other complications of treatment  Promoting adequate nutrition  Encouraging proper hydration  Improving or maintaining quality of life  Maintaining optimal therapy adherence  Minimizing and managing side effects    Goals of Therapy Status: Discussed (new start)    Assessment/Plan  Patient plans to start therapy on 06/12/23      Indication, dosage, appropriateness, effectiveness, safety and convenience of his specialty medication(s) were reviewed today.     Patient Education    Patient received education on the following:   Expectations and possible outcomes of therapy  Proper use, timely administration, and missed dose management  Duration of therapy  Side effects, including prevention, minimization, and management  Contraindications and safety precautions  New or changed medications, including prescribe and over the counter medications and supplements  Reviews recommended vaccinations, as appropriate  Storage, safe handling, and disposal    Message to MD for nausea medication and to notify about posaconazole.     Tasks added this encounter   6/12/2023 - Benefits Investigation  6/10/2023 - Pickup Reminder  11/28/2023 - Clinical Assessment (6 month recurrence)   Tasks due within next 3 months   No tasks due.     Lennie Fletcher, PharmD  [facility]  [address]  [facility] [address]  Phone: [phone]  Fax: [phone]

## 2023-06-08 NOTE — PROGRESS NOTES
PATIENT: Guillaume Salinas  MRN: [id number]  DATE: 6/9/2023    Diagnosis:   1. Myelodysplastic syndrome    2. Symptomatic anemia    3. 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 Chemotherapy-induced nausea and vomiting      Chief Complaint: myelodysplastic syndrome    Oncologic History:      Oncologic History Myelodysplastic syndrome      Oncologic Treatment Azacitidine/venetoclax (start date 6/12/23).      Pathology 5/23/23:  LEFT ILIAC CREST BONE MARROW ASPIRATE, BONE MARROW CLOT, AND BONE MARROW CORE BIOPSY WITH:     CELLULARITY= 90-95%, MYELOID PREDOMINANCE (M/E= 7:1).   CONSISTENT WITH MYELODYSPLASTIC SYNDROME WITH EXCESS BLASTS-2 (16-17%).  SEE COMMENT.   ADEQUATE STORAGE IRON.   INCREASED NUMBER OF MEGAKARYOCYTES WITH DYSPLASTIC MORPHOLOGY.       4/26/23:  Bone marrow, left iliac crest, aspirate, clot, and core biopsy:   --Hypercellular marrow for age, 80-90% with trilineage maturation showing increased blasts and small megakaryocytic forms, most compatible with a primary marrow neoplasm, favor myelodysplasia with excess blasts (MDS-EB-2) with impending evolution, final   classification pending correlation with cytogenetic and molecular studies, see comment   --Stainable iron is not increased   --Mild reticulin fibrosis          Bone marrow, left iliac crest, aspirate, clot, and core biopsy:   --Hypercellular marrow for age, 80-90% with trilineage maturation showing increased blasts and small megakaryocytic forms, most compatible with a primary marrow neoplasm, favor myelodysplasia with excess blasts (MDS-EB-2) with impending evolution, final   classification pending correlation with cytogenetic and molecular studies, see comment   --Stainable iron is not increased   --Mild reticulin fibrosis     Comment:  Concomitantly submitted flow cytometric analysis detects a mildly increased myeloblast population, concerning for a primary marrow process.  The blast gate  "is increased with approximately 8% CD38/CD34 positive blasts identified.  Populations of    B lymphocytes are polyclonal and T lymphocytes are immunophenotypically unremarkable.     This study is somewhat limited by lack of cellularity on aspirate smears with mild reticulin fibrosis noted.  However, there are increased CD34 positive blasts, counted at 12% on the immunohistochemical stain with increased megakaryocytes showing many micromegakaryocytic forms.  The morphology in conjunction with peripheral cytopenias is compatible with a primary marrow neoplasm, highly suggestive of myelodysplasia with excess blasts (MDS-EB-2) although an evolving acute leukemia is of concern.     Correlation with clinical findings and any available cytogenetic and molecular studies is required for further characterization.              Subjective:    History of Present Illness: Mr. Betsy Salinas is a 67 y.o. male who presented in April 2023 for evaluation and management of anemia.    [I do not see evidence of anemia in his labs, but he was referred for anemia workup.]    - he went to [place] for a dental procedure. He had several teeth removed ("an intense procedure per his wife"). He had taken antibiotics/amoxicillin and ibuprofen. He had the second part of procedure about a week later. He noted severe fatigue, weakness.  - he underwent bone marrow biopsy on 4/26/23.  - he met with Dr. Hickey on 5/8/23. He recommended repeat bone marrow biopsy.    Interval history:  - he presents for a follow-up appointment for his myelodysplastic syndrome.  - he underwent bone marrow biopsy on 5/23/23.  - he met with Dr. Hickey on 5/30/23. 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 CG/FISH/NGS pending. I reviewed with him and his family the aggressive nature of this disease, including natural history, prognosis, and treatment options. I recommended treatment with azacitidine 75 mg/m2 daily x7 " "days plus venetoclax 100mg (posa) daily x21 of 28 days. He was in agreement. Consent signed today.   Azacitidine plus venetoclax x21 of 28 days as above. Taz ramp up ordered.  Repeat bone marrow biopsy at the end of cycle 1. Orders placed.     Stem cell transplant candidate: We briefly discussed allogeneic stem cell transplantation in MDS. He will need transplant in CR1. Will plan for further discussion of stem cell transplant and meeting with transplant coordinators at next follow up in 2-3 weeks.   Will send HLA typing with next labs."    - he received two units of blood on 6/8/23. He is feeling better today. He endorses fatigue, dyspnea upon exertion. He denies chest pain, nausea, vomiting, diarrhea, constipation.  - he is scheduled to start cycle #1 of azacitidine/venetoclax on 6/12/23.        Past medical, surgical, family, and social histories have been reviewed and updated below.    Past Medical History:   Past Medical History:   Diagnosis Date    Anticoagulant long-term use     Coronary artery disease     Hypertension     Peripheral vascular disease, unspecified        Past Surgical History:   Past Surgical History:   Procedure Laterality Date    BONE MARROW BIOPSY Left 4/26/2023    Procedure: Biopsy-bone marrow;  Surgeon: Harry Diamond MD;  Location: [facility] OR;  Service: Oncology;  Laterality: Left;    COLONOSCOPY N/A 01/05/2022    Procedure: COLONOSCOPY Golytely Vaccinated will bring cards;  Surgeon: Dereje Simon MD;  Location: [facility] ENDO;  Service: Endoscopy;  Laterality: N/A;  Do not cancel this order       Family History:   Family History   Problem Relation Age of Onset    Hypertension Mother     Cancer Father     Cancer Brother     No Known Problems Daughter     No Known Problems Daughter     No Known Problems Son        Social History:  reports that he quit smoking about 3 months ago. His smoking use included cigarettes. He has a 12.50 pack-year smoking history. He has never used smokeless " tobacco. He reports that he does not currently use alcohol. He reports that he does not use drugs.    Allergies:  Review of patient's allergies indicates:  No Known Allergies    Medications:  Current Outpatient Medications   Medication Sig Dispense Refill    acyclovir (ZOVIRAX) 400 MG tablet Take 1 tablet (400 mg total) by mouth 2 (two) times daily. 60 tablet 11    allopurinoL (ZYLOPRIM) 300 MG tablet Take 1 tablet (300 mg total) by mouth 2 (two) times daily. 60 tablet 11    aspirin (ECOTRIN) 81 MG EC tablet Take 1 tablet (81 mg total) by mouth once daily. 30 tablet 12    atorvastatin (LIPITOR) 80 MG tablet Take 1 tablet (80 mg total) by mouth once daily. 90 tablet 3    carvediloL (COREG) 6.25 MG tablet TAKE 1 TABLET(6.25 MG) BY MOUTH TWICE DAILY WITH MEALS 180 tablet 3    cilostazoL (PLETAL) 50 MG Tab Take 1 tablet (50 mg total) by mouth 2 (two) times daily. 180 tablet 3    gabapentin (NEURONTIN) 300 MG capsule Take 1 capsule (300 mg total) by mouth 3 (three) times daily. 90 capsule 11    levoFLOXacin (LEVAQUIN) 500 MG tablet Take 1 tablet (500 mg total) by mouth once daily. 30 tablet 11    posaconazole (NOXAFIL) 100 mg TbEC tablet Take 3 tablets (300 mg total) by mouth once daily. 90 tablet 11    venetoclax (VENCLEXTA) 10 mg Tab Take 1 tablet (10 mg) by mouth of day 1 of 28 day cycle. Discard remaining tablet. 2 tablet 0    venetoclax (VENCLEXTA) 100 mg Tab Take 1 tablet (100 mg) by mouth once daily on days 3-14 of a 28-day cycle (FOR CYCLE 1 ONLY). Do not discard any remaining tablets. 28 tablet 0    venetoclax (VENCLEXTA) 100 mg Tab Take 1 tablet (100 mg) by mouth once daily on days 1-14 of a 28-day cycle. Do not discard any remaining tablets. 28 tablet 11    venetoclax (VENCLEXTA) 50 mg Tab Take 1 tablet (50 mg total) by mouth once on day 2 of 28 day cycle. 1 tablet 0    zolpidem (AMBIEN) 10 mg Tab Take 10 mg by mouth nightly as needed.       No current facility-administered medications for this visit.  "    Facility-Administered Medications Ordered in Other Visits   Medication Dose Route Frequency Provider Last Rate Last Admin    acetaminophen tablet 650 mg  650 mg Oral PRN Harry Diamond MD        diphenhydrAMINE capsule 25 mg  25 mg Oral PRN Harry Diamond MD           Review of Systems   Constitutional:  Positive for fatigue.   HENT:  Negative for sore throat.    Eyes:  Negative for visual disturbance.   Respiratory:  Positive for shortness of breath.    Cardiovascular:  Negative for chest pain.   Gastrointestinal:  Negative for abdominal pain.   Genitourinary:  Negative for dysuria.   Musculoskeletal:  Negative for back pain.   Skin:  Negative for rash.   Neurological:  Positive for weakness. Negative for headaches.   Hematological:  Negative for adenopathy.   Psychiatric/Behavioral:  The patient is not nervous/anxious.      ECOG Performance Status:   ECOG SCORE    1 - Restricted in strenuous activity-ambulatory and able to carry out work of a light nature         Objective:      Vitals:   Vitals:    06/09/23 0820   BP: 105/64   BP Location: Left arm   Patient Position: Sitting   BP Method: Large (Automatic)   Pulse: 73   Resp: 18   SpO2: 96%   Weight: 74.1 kg (163 lb 5.8 oz)   Height: 5' 9" (1.753 m)     BMI: Body mass index is 24.12 kg/m².    Physical Exam  Vitals and nursing note reviewed.   Constitutional:       Appearance: He is well-developed.   HENT:      Head: Normocephalic and atraumatic.   Eyes:      Pupils: Pupils are equal, round, and reactive to light.   Cardiovascular:      Rate and Rhythm: Normal rate and regular rhythm.   Pulmonary:      Effort: Pulmonary effort is normal.      Breath sounds: Normal breath sounds.   Abdominal:      General: Bowel sounds are normal.      Palpations: Abdomen is soft.   Musculoskeletal:         General: Normal range of motion.      Cervical back: Normal range of motion and neck supple.   Skin:     General: Skin is warm and dry.   Neurological:      Mental Status: " He is alert and oriented to person, place, and time.   Psychiatric:         Behavior: Behavior normal.         Thought Content: Thought content normal.         Judgment: Judgment normal.       Laboratory Data:  Labs have been reviewed.    Lab Results   Component Value Date    WBC 2.63 (L) 06/07/2023    WBC 2.63 (L) 06/07/2023    HGB 6.3 (L) 06/07/2023    HGB 6.3 (L) 06/07/2023    HCT 19.2 (LL) 06/07/2023    HCT 19.2 (LL) 06/07/2023    MCV 91 06/07/2023    MCV 91 06/07/2023     (H) 06/07/2023     (H) 06/07/2023       Imaging:        Assessment:       1. Myelodysplastic syndrome    2. Symptomatic anemia    3. 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 Chemotherapy-induced nausea and vomiting           Plan:     Myelodysplastic syndrome  - bone marrow biopsy (4/26/23) revealed myelodysplastic syndrome.  - he met with Dr. Hickey on 5/8/23. He recommended repeat bone marrow biopsy.  - bone marrow biopsy (5/23/23) revealed myelodysplastic syndrome with excess blasts.  - he met with Dr. Hickey on 5/30/23. He recommended azacitidine (7-day) with venetoclax.  - he received blood transfusion on 6/8/23.  - Labs have been reviewed.   - okay to proceed with cycle #1 of azacitidine/venetoclax, scheduled to start on 6/12/23.  - continue weekly labs and blood transfusions as needed.  - start anti-infectious therapy per Dr. Hickey's plan.  - return to clinic in one month.    2. Chemotherapy-induced nausea/vomiting  - I sent phenergan in    3. Advance Care Planning     Power of   After our discussion (at previous visit), the patient has decided not to complete a HCPOA.       - return to clinic in one month.    Harry Diamond M.D.  Hematology/Oncology  [facility] [address], [address]  [place] [address]  Phone: [phone]  Fax: [phone]

## 2023-06-09 ENCOUNTER — DOCUMENTATION ONLY (OUTPATIENT)
Dept: PHARMACY | Facility: HOSPITAL | Age: 68
End: 2023-06-09
Payer: MEDICARE

## 2023-06-09 ENCOUNTER — OFFICE VISIT (OUTPATIENT)
Dept: HEMATOLOGY/ONCOLOGY | Facility: CLINIC | Age: 68
End: 2023-06-09
Payer: MEDICARE

## 2023-06-09 ENCOUNTER — TELEPHONE (OUTPATIENT)
Dept: PHARMACY | Facility: CLINIC | Age: 68
End: 2023-06-09
Payer: MEDICARE

## 2023-06-09 VITALS
HEIGHT: 69 IN | RESPIRATION RATE: 18 BRPM | WEIGHT: 163.38 LBS | DIASTOLIC BLOOD PRESSURE: 64 MMHG | BODY MASS INDEX: 24.2 KG/M2 | OXYGEN SATURATION: 96 % | SYSTOLIC BLOOD PRESSURE: 105 MMHG | HEART RATE: 73 BPM

## 2023-06-09 DIAGNOSIS — D46.9 MYELODYSPLASTIC SYNDROME: Primary | ICD-10-CM

## 2023-06-09 DIAGNOSIS — D64.9 SYMPTOMATIC ANEMIA: ICD-10-CM

## 2023-06-09 DIAGNOSIS — D49.9 IMMUNODEFICIENCY SECONDARY TO NEOPLASM: ICD-10-CM

## 2023-06-09 DIAGNOSIS — Z79.899 IMMUNODEFICIENCY DUE TO DRUG THERAPY: ICD-10-CM

## 2023-06-09 DIAGNOSIS — D84.81 IMMUNODEFICIENCY SECONDARY TO NEOPLASM: ICD-10-CM

## 2023-06-09 DIAGNOSIS — D49.9 IMMUNODEFICIENCY SECONDARY TO NEOPLASM: Primary | ICD-10-CM

## 2023-06-09 DIAGNOSIS — R11.2 CHEMOTHERAPY-INDUCED NAUSEA AND VOMITING: ICD-10-CM

## 2023-06-09 DIAGNOSIS — T45.1X5A CHEMOTHERAPY-INDUCED NAUSEA AND VOMITING: ICD-10-CM

## 2023-06-09 DIAGNOSIS — D84.81 IMMUNODEFICIENCY SECONDARY TO NEOPLASM: Primary | ICD-10-CM

## 2023-06-09 DIAGNOSIS — D84.821 IMMUNODEFICIENCY DUE TO DRUG THERAPY: ICD-10-CM

## 2023-06-09 LAB
ANNOTATION COMMENT IMP: NORMAL
COMMENT: NORMAL
FINAL PATHOLOGIC DIAGNOSIS: NORMAL
GROSS: NORMAL
Lab: NORMAL
MICROSCOPIC EXAM: NORMAL
NGS CLINCIAL TRIALS: NORMAL
NGS INDICATION OF TEST: NORMAL
NGS INTERPRETATION: NORMAL
NGS ONCOHEME PANEL GENE LIST: NORMAL
NGS PATHOGENIC MUTATIONS DETECTED: NORMAL
NGS REVIEWED BY:: NORMAL
NGS VARIANTS OF UNKNOWN SIGNIFICANCE: NORMAL
NGSHM RESULT, BONE MARROW: NORMAL
REF LAB TEST METHOD: NORMAL
SPECIMEN SOURCE: NORMAL
SUPPLEMENTAL DIAGNOSIS: NORMAL
TEST PERFORMANCE INFO SPEC: NORMAL

## 2023-06-09 PROCEDURE — 99499 RISK ADDL DX/OHS AUDIT: ICD-10-PCS | Mod: S$GLB,,, | Performed by: INTERNAL MEDICINE

## 2023-06-09 PROCEDURE — 3078F DIAST BP <80 MM HG: CPT | Mod: CPTII,S$GLB,, | Performed by: INTERNAL MEDICINE

## 2023-06-09 PROCEDURE — 3074F PR MOST RECENT SYSTOLIC BLOOD PRESSURE < 130 MM HG: ICD-10-PCS | Mod: CPTII,S$GLB,, | Performed by: INTERNAL MEDICINE

## 2023-06-09 PROCEDURE — 99499 UNLISTED E&M SERVICE: CPT | Mod: S$GLB,,, | Performed by: INTERNAL MEDICINE

## 2023-06-09 PROCEDURE — 99215 PR OFFICE/OUTPT VISIT, EST, LEVL V, 40-54 MIN: ICD-10-PCS | Mod: S$GLB,,, | Performed by: INTERNAL MEDICINE

## 2023-06-09 PROCEDURE — 3288F PR FALLS RISK ASSESSMENT DOCUMENTED: ICD-10-PCS | Mod: CPTII,S$GLB,, | Performed by: INTERNAL MEDICINE

## 2023-06-09 PROCEDURE — 1159F PR MEDICATION LIST DOCUMENTED IN MEDICAL RECORD: ICD-10-PCS | Mod: CPTII,S$GLB,, | Performed by: INTERNAL MEDICINE

## 2023-06-09 PROCEDURE — 1160F PR REVIEW ALL MEDS BY PRESCRIBER/CLIN PHARMACIST DOCUMENTED: ICD-10-PCS | Mod: CPTII,S$GLB,, | Performed by: INTERNAL MEDICINE

## 2023-06-09 PROCEDURE — 3288F FALL RISK ASSESSMENT DOCD: CPT | Mod: CPTII,S$GLB,, | Performed by: INTERNAL MEDICINE

## 2023-06-09 PROCEDURE — 1160F RVW MEDS BY RX/DR IN RCRD: CPT | Mod: CPTII,S$GLB,, | Performed by: INTERNAL MEDICINE

## 2023-06-09 PROCEDURE — 1126F PR PAIN SEVERITY QUANTIFIED, NO PAIN PRESENT: ICD-10-PCS | Mod: CPTII,S$GLB,, | Performed by: INTERNAL MEDICINE

## 2023-06-09 PROCEDURE — 99215 OFFICE O/P EST HI 40 MIN: CPT | Mod: S$GLB,,, | Performed by: INTERNAL MEDICINE

## 2023-06-09 PROCEDURE — 99999 PR PBB SHADOW E&M-EST. PATIENT-LVL IV: ICD-10-PCS | Mod: PBBFAC,,, | Performed by: INTERNAL MEDICINE

## 2023-06-09 PROCEDURE — 99999 PR PBB SHADOW E&M-EST. PATIENT-LVL IV: CPT | Mod: PBBFAC,,, | Performed by: INTERNAL MEDICINE

## 2023-06-09 PROCEDURE — 3078F PR MOST RECENT DIASTOLIC BLOOD PRESSURE < 80 MM HG: ICD-10-PCS | Mod: CPTII,S$GLB,, | Performed by: INTERNAL MEDICINE

## 2023-06-09 PROCEDURE — 1159F MED LIST DOCD IN RCRD: CPT | Mod: CPTII,S$GLB,, | Performed by: INTERNAL MEDICINE

## 2023-06-09 PROCEDURE — 1101F PT FALLS ASSESS-DOCD LE1/YR: CPT | Mod: CPTII,S$GLB,, | Performed by: INTERNAL MEDICINE

## 2023-06-09 PROCEDURE — 3008F BODY MASS INDEX DOCD: CPT | Mod: CPTII,S$GLB,, | Performed by: INTERNAL MEDICINE

## 2023-06-09 PROCEDURE — 1126F AMNT PAIN NOTED NONE PRSNT: CPT | Mod: CPTII,S$GLB,, | Performed by: INTERNAL MEDICINE

## 2023-06-09 PROCEDURE — 1101F PR PT FALLS ASSESS DOC 0-1 FALLS W/OUT INJ PAST YR: ICD-10-PCS | Mod: CPTII,S$GLB,, | Performed by: INTERNAL MEDICINE

## 2023-06-09 PROCEDURE — 3008F PR BODY MASS INDEX (BMI) DOCUMENTED: ICD-10-PCS | Mod: CPTII,S$GLB,, | Performed by: INTERNAL MEDICINE

## 2023-06-09 PROCEDURE — 3074F SYST BP LT 130 MM HG: CPT | Mod: CPTII,S$GLB,, | Performed by: INTERNAL MEDICINE

## 2023-06-09 RX ORDER — AZACITIDINE 100 MG/1
75 INJECTION, POWDER, LYOPHILIZED, FOR SOLUTION INTRAVENOUS; SUBCUTANEOUS
Status: CANCELLED | OUTPATIENT
Start: 2023-06-14

## 2023-06-09 RX ORDER — ONDANSETRON HYDROCHLORIDE 8 MG/1
16 TABLET, FILM COATED ORAL
Status: CANCELLED | OUTPATIENT
Start: 2023-06-16

## 2023-06-09 RX ORDER — ONDANSETRON HYDROCHLORIDE 8 MG/1
16 TABLET, FILM COATED ORAL
Status: CANCELLED | OUTPATIENT
Start: 2023-06-14

## 2023-06-09 RX ORDER — ONDANSETRON HYDROCHLORIDE 8 MG/1
16 TABLET, FILM COATED ORAL
Status: CANCELLED | OUTPATIENT
Start: 2023-06-13

## 2023-06-09 RX ORDER — AZACITIDINE 100 MG/1
75 INJECTION, POWDER, LYOPHILIZED, FOR SOLUTION INTRAVENOUS; SUBCUTANEOUS
Status: CANCELLED | OUTPATIENT
Start: 2023-06-12

## 2023-06-09 RX ORDER — PROMETHAZINE HYDROCHLORIDE 25 MG/1
25 TABLET ORAL EVERY 8 HOURS PRN
Qty: 40 TABLET | Refills: 5 | Status: SHIPPED | OUTPATIENT
Start: 2023-06-09

## 2023-06-09 RX ORDER — AZACITIDINE 100 MG/1
75 INJECTION, POWDER, LYOPHILIZED, FOR SOLUTION INTRAVENOUS; SUBCUTANEOUS
Status: CANCELLED | OUTPATIENT
Start: 2023-06-13

## 2023-06-09 RX ORDER — AZACITIDINE 100 MG/1
75 INJECTION, POWDER, LYOPHILIZED, FOR SOLUTION INTRAVENOUS; SUBCUTANEOUS
Status: CANCELLED | OUTPATIENT
Start: 2023-06-19

## 2023-06-09 RX ORDER — VORICONAZOLE 200 MG/1
200 TABLET, FILM COATED ORAL 2 TIMES DAILY
Qty: 60 TABLET | Refills: 0 | Status: SHIPPED | OUTPATIENT
Start: 2023-06-09 | End: 2023-07-06 | Stop reason: SDUPTHER

## 2023-06-09 RX ORDER — ONDANSETRON HYDROCHLORIDE 8 MG/1
16 TABLET, FILM COATED ORAL
Status: CANCELLED | OUTPATIENT
Start: 2023-06-12

## 2023-06-09 RX ORDER — ONDANSETRON HYDROCHLORIDE 8 MG/1
16 TABLET, FILM COATED ORAL
Status: CANCELLED | OUTPATIENT
Start: 2023-06-20

## 2023-06-09 RX ORDER — ONDANSETRON HYDROCHLORIDE 8 MG/1
16 TABLET, FILM COATED ORAL
Status: CANCELLED | OUTPATIENT
Start: 2023-06-19

## 2023-06-09 RX ORDER — AZACITIDINE 100 MG/1
75 INJECTION, POWDER, LYOPHILIZED, FOR SOLUTION INTRAVENOUS; SUBCUTANEOUS
Status: CANCELLED | OUTPATIENT
Start: 2023-06-16

## 2023-06-09 RX ORDER — AZACITIDINE 100 MG/1
75 INJECTION, POWDER, LYOPHILIZED, FOR SOLUTION INTRAVENOUS; SUBCUTANEOUS
Status: CANCELLED | OUTPATIENT
Start: 2023-06-15

## 2023-06-09 RX ORDER — ONDANSETRON HYDROCHLORIDE 8 MG/1
16 TABLET, FILM COATED ORAL
Status: CANCELLED | OUTPATIENT
Start: 2023-06-15

## 2023-06-09 RX ORDER — AZACITIDINE 100 MG/1
75 INJECTION, POWDER, LYOPHILIZED, FOR SOLUTION INTRAVENOUS; SUBCUTANEOUS
Status: CANCELLED | OUTPATIENT
Start: 2023-06-20

## 2023-06-09 NOTE — TELEPHONE ENCOUNTER
Hello,       A Patient Assistance Application for Guillaume Betsy Brad - MRN  [id number] was faxed to your office [phone] Please have Dr. Joyner review the application to ensure the prescription is correct. If correct, sign and fax the application back to the Pharmacy Patient Assistance Team @[phone].      If changes need to be made to this application, please let me know so corrections can be made, and the application will be faxed back to you for approval and signature.

## 2023-06-09 NOTE — TELEPHONE ENCOUNTER
Pt daughter called to clarify if venclexta is to be taken through the weekend. Counseled that venclexta should be taken days 1-14, including weekend days.

## 2023-06-12 ENCOUNTER — INFUSION (OUTPATIENT)
Dept: INFUSION THERAPY | Facility: HOSPITAL | Age: 68
End: 2023-06-12
Attending: INTERNAL MEDICINE
Payer: MEDICARE

## 2023-06-12 VITALS
TEMPERATURE: 98 F | HEIGHT: 69 IN | WEIGHT: 163.38 LBS | HEART RATE: 80 BPM | BODY MASS INDEX: 24.2 KG/M2 | OXYGEN SATURATION: 97 % | RESPIRATION RATE: 18 BRPM | DIASTOLIC BLOOD PRESSURE: 62 MMHG | SYSTOLIC BLOOD PRESSURE: 130 MMHG

## 2023-06-12 DIAGNOSIS — D46.9 MYELODYSPLASTIC SYNDROME: Primary | ICD-10-CM

## 2023-06-12 PROCEDURE — 63600175 PHARM REV CODE 636 W HCPCS: Performed by: INTERNAL MEDICINE

## 2023-06-12 PROCEDURE — 25000003 PHARM REV CODE 250: Performed by: INTERNAL MEDICINE

## 2023-06-12 PROCEDURE — 96401 CHEMO ANTI-NEOPL SQ/IM: CPT

## 2023-06-12 RX ORDER — ONDANSETRON HYDROCHLORIDE 8 MG/1
16 TABLET, FILM COATED ORAL
Status: COMPLETED | OUTPATIENT
Start: 2023-06-12 | End: 2023-06-12

## 2023-06-12 RX ORDER — AZACITIDINE 100 MG/1
75 INJECTION, POWDER, LYOPHILIZED, FOR SOLUTION INTRAVENOUS; SUBCUTANEOUS
Status: COMPLETED | OUTPATIENT
Start: 2023-06-12 | End: 2023-06-12

## 2023-06-12 RX ADMIN — ONDANSETRON HYDROCHLORIDE 16 MG: 8 TABLET, FILM COATED ORAL at 08:06

## 2023-06-12 RX ADMIN — AZACITIDINE 140 MG: 100 INJECTION, POWDER, LYOPHILIZED, FOR SOLUTION INTRAVENOUS; SUBCUTANEOUS at 09:06

## 2023-06-13 ENCOUNTER — INFUSION (OUTPATIENT)
Dept: INFUSION THERAPY | Facility: HOSPITAL | Age: 68
End: 2023-06-13
Attending: INTERNAL MEDICINE
Payer: MEDICARE

## 2023-06-13 VITALS
OXYGEN SATURATION: 97 % | HEART RATE: 86 BPM | SYSTOLIC BLOOD PRESSURE: 123 MMHG | TEMPERATURE: 99 F | DIASTOLIC BLOOD PRESSURE: 57 MMHG | HEIGHT: 69 IN | BODY MASS INDEX: 24.2 KG/M2 | RESPIRATION RATE: 18 BRPM | WEIGHT: 163.38 LBS

## 2023-06-13 DIAGNOSIS — D46.9 MYELODYSPLASTIC SYNDROME: Primary | ICD-10-CM

## 2023-06-13 PROCEDURE — 96401 CHEMO ANTI-NEOPL SQ/IM: CPT

## 2023-06-13 PROCEDURE — 25000003 PHARM REV CODE 250: Performed by: INTERNAL MEDICINE

## 2023-06-13 PROCEDURE — 63600175 PHARM REV CODE 636 W HCPCS: Performed by: INTERNAL MEDICINE

## 2023-06-13 RX ORDER — AZACITIDINE 100 MG/1
75 INJECTION, POWDER, LYOPHILIZED, FOR SOLUTION INTRAVENOUS; SUBCUTANEOUS
Status: COMPLETED | OUTPATIENT
Start: 2023-06-13 | End: 2023-06-13

## 2023-06-13 RX ORDER — ONDANSETRON HYDROCHLORIDE 8 MG/1
16 TABLET, FILM COATED ORAL
Status: COMPLETED | OUTPATIENT
Start: 2023-06-13 | End: 2023-06-13

## 2023-06-13 RX ADMIN — ONDANSETRON HYDROCHLORIDE 16 MG: 8 TABLET, FILM COATED ORAL at 11:06

## 2023-06-13 RX ADMIN — AZACITIDINE 140 MG: 100 INJECTION, POWDER, LYOPHILIZED, FOR SOLUTION INTRAVENOUS; SUBCUTANEOUS at 11:06

## 2023-06-13 NOTE — NURSING
Pt tolerated Vidaza injection well, no complaints at this time. No adverse reactions noted. Next appointment scheduled and pt d/c in no acute distress.

## 2023-06-14 ENCOUNTER — INFUSION (OUTPATIENT)
Dept: INFUSION THERAPY | Facility: HOSPITAL | Age: 68
End: 2023-06-14
Attending: INTERNAL MEDICINE
Payer: MEDICARE

## 2023-06-14 VITALS
TEMPERATURE: 99 F | DIASTOLIC BLOOD PRESSURE: 61 MMHG | HEART RATE: 82 BPM | RESPIRATION RATE: 14 BRPM | SYSTOLIC BLOOD PRESSURE: 111 MMHG | BODY MASS INDEX: 24.2 KG/M2 | WEIGHT: 163.38 LBS | HEIGHT: 69 IN | OXYGEN SATURATION: 97 %

## 2023-06-14 DIAGNOSIS — D46.9 MYELODYSPLASTIC SYNDROME: Primary | ICD-10-CM

## 2023-06-14 PROCEDURE — 25000003 PHARM REV CODE 250: Performed by: INTERNAL MEDICINE

## 2023-06-14 PROCEDURE — 63600175 PHARM REV CODE 636 W HCPCS: Performed by: INTERNAL MEDICINE

## 2023-06-14 PROCEDURE — 96401 CHEMO ANTI-NEOPL SQ/IM: CPT

## 2023-06-14 RX ORDER — AZACITIDINE 100 MG/1
75 INJECTION, POWDER, LYOPHILIZED, FOR SOLUTION INTRAVENOUS; SUBCUTANEOUS
Status: COMPLETED | OUTPATIENT
Start: 2023-06-14 | End: 2023-06-14

## 2023-06-14 RX ORDER — ONDANSETRON HYDROCHLORIDE 8 MG/1
16 TABLET, FILM COATED ORAL
Status: COMPLETED | OUTPATIENT
Start: 2023-06-14 | End: 2023-06-14

## 2023-06-14 RX ADMIN — AZACITIDINE 140 MG: 100 INJECTION, POWDER, LYOPHILIZED, FOR SOLUTION INTRAVENOUS; SUBCUTANEOUS at 10:06

## 2023-06-14 RX ADMIN — ONDANSETRON HYDROCHLORIDE 16 MG: 8 TABLET, FILM COATED ORAL at 09:06

## 2023-06-15 ENCOUNTER — INFUSION (OUTPATIENT)
Dept: INFUSION THERAPY | Facility: HOSPITAL | Age: 68
End: 2023-06-15
Attending: INTERNAL MEDICINE
Payer: MEDICARE

## 2023-06-15 VITALS
OXYGEN SATURATION: 98 % | HEIGHT: 69 IN | WEIGHT: 163.38 LBS | SYSTOLIC BLOOD PRESSURE: 150 MMHG | BODY MASS INDEX: 24.2 KG/M2 | DIASTOLIC BLOOD PRESSURE: 64 MMHG | TEMPERATURE: 100 F | HEART RATE: 91 BPM | RESPIRATION RATE: 16 BRPM

## 2023-06-15 DIAGNOSIS — D46.9 MYELODYSPLASTIC SYNDROME: Primary | ICD-10-CM

## 2023-06-15 PROCEDURE — 96401 CHEMO ANTI-NEOPL SQ/IM: CPT

## 2023-06-15 PROCEDURE — 25000003 PHARM REV CODE 250: Performed by: INTERNAL MEDICINE

## 2023-06-15 PROCEDURE — 63600175 PHARM REV CODE 636 W HCPCS: Performed by: INTERNAL MEDICINE

## 2023-06-15 RX ORDER — AZACITIDINE 100 MG/1
75 INJECTION, POWDER, LYOPHILIZED, FOR SOLUTION INTRAVENOUS; SUBCUTANEOUS
Status: COMPLETED | OUTPATIENT
Start: 2023-06-15 | End: 2023-06-15

## 2023-06-15 RX ORDER — ONDANSETRON HYDROCHLORIDE 8 MG/1
16 TABLET, FILM COATED ORAL
Status: COMPLETED | OUTPATIENT
Start: 2023-06-15 | End: 2023-06-15

## 2023-06-15 RX ADMIN — ONDANSETRON HYDROCHLORIDE 16 MG: 8 TABLET, FILM COATED ORAL at 09:06

## 2023-06-15 RX ADMIN — AZACITIDINE 140 MG: 100 INJECTION, POWDER, LYOPHILIZED, FOR SOLUTION INTRAVENOUS; SUBCUTANEOUS at 10:06

## 2023-06-16 ENCOUNTER — INFUSION (OUTPATIENT)
Dept: INFUSION THERAPY | Facility: HOSPITAL | Age: 68
End: 2023-06-16
Attending: INTERNAL MEDICINE
Payer: MEDICARE

## 2023-06-16 VITALS
RESPIRATION RATE: 16 BRPM | BODY MASS INDEX: 24.2 KG/M2 | TEMPERATURE: 98 F | HEIGHT: 69 IN | OXYGEN SATURATION: 95 % | SYSTOLIC BLOOD PRESSURE: 123 MMHG | DIASTOLIC BLOOD PRESSURE: 61 MMHG | WEIGHT: 163.38 LBS | HEART RATE: 81 BPM

## 2023-06-16 DIAGNOSIS — D46.9 MYELODYSPLASTIC SYNDROME: Primary | ICD-10-CM

## 2023-06-16 PROCEDURE — 25000003 PHARM REV CODE 250: Performed by: INTERNAL MEDICINE

## 2023-06-16 PROCEDURE — 96401 CHEMO ANTI-NEOPL SQ/IM: CPT

## 2023-06-16 PROCEDURE — 63600175 PHARM REV CODE 636 W HCPCS: Performed by: INTERNAL MEDICINE

## 2023-06-16 RX ORDER — AZACITIDINE 100 MG/1
75 INJECTION, POWDER, LYOPHILIZED, FOR SOLUTION INTRAVENOUS; SUBCUTANEOUS
Status: COMPLETED | OUTPATIENT
Start: 2023-06-16 | End: 2023-06-16

## 2023-06-16 RX ORDER — ONDANSETRON HYDROCHLORIDE 8 MG/1
16 TABLET, FILM COATED ORAL
Status: COMPLETED | OUTPATIENT
Start: 2023-06-16 | End: 2023-06-16

## 2023-06-16 RX ADMIN — AZACITIDINE 140 MG: 100 INJECTION, POWDER, LYOPHILIZED, FOR SOLUTION INTRAVENOUS; SUBCUTANEOUS at 10:06

## 2023-06-16 RX ADMIN — ONDANSETRON HYDROCHLORIDE 16 MG: 8 TABLET, FILM COATED ORAL at 09:06

## 2023-06-19 ENCOUNTER — PATIENT MESSAGE (OUTPATIENT)
Dept: HEMATOLOGY/ONCOLOGY | Facility: CLINIC | Age: 68
End: 2023-06-19
Payer: MEDICARE

## 2023-06-19 ENCOUNTER — INFUSION (OUTPATIENT)
Dept: INFUSION THERAPY | Facility: HOSPITAL | Age: 68
End: 2023-06-19
Attending: INTERNAL MEDICINE
Payer: MEDICARE

## 2023-06-19 VITALS
TEMPERATURE: 98 F | OXYGEN SATURATION: 98 % | RESPIRATION RATE: 18 BRPM | BODY MASS INDEX: 24.2 KG/M2 | WEIGHT: 163.38 LBS | SYSTOLIC BLOOD PRESSURE: 123 MMHG | DIASTOLIC BLOOD PRESSURE: 63 MMHG | HEIGHT: 69 IN | HEART RATE: 93 BPM

## 2023-06-19 DIAGNOSIS — D46.9 MYELODYSPLASTIC SYNDROME: Primary | ICD-10-CM

## 2023-06-19 PROCEDURE — 63600175 PHARM REV CODE 636 W HCPCS: Performed by: INTERNAL MEDICINE

## 2023-06-19 PROCEDURE — 25000003 PHARM REV CODE 250: Performed by: INTERNAL MEDICINE

## 2023-06-19 PROCEDURE — 96401 CHEMO ANTI-NEOPL SQ/IM: CPT

## 2023-06-19 RX ORDER — ONDANSETRON HYDROCHLORIDE 8 MG/1
16 TABLET, FILM COATED ORAL
Status: COMPLETED | OUTPATIENT
Start: 2023-06-19 | End: 2023-06-19

## 2023-06-19 RX ORDER — AZACITIDINE 100 MG/1
75 INJECTION, POWDER, LYOPHILIZED, FOR SOLUTION INTRAVENOUS; SUBCUTANEOUS
Status: COMPLETED | OUTPATIENT
Start: 2023-06-19 | End: 2023-06-19

## 2023-06-19 RX ADMIN — AZACITIDINE 140 MG: 100 INJECTION, POWDER, LYOPHILIZED, FOR SOLUTION INTRAVENOUS; SUBCUTANEOUS at 02:06

## 2023-06-19 RX ADMIN — ONDANSETRON HYDROCHLORIDE 16 MG: 8 TABLET, FILM COATED ORAL at 01:06

## 2023-06-19 NOTE — NURSING
Patient tolerated Cycle 1 Day 8 Vidaza injection well, VSS. No adverse reaction noted. Next appointment scheduled and pt d/c in no acute distress.

## 2023-06-20 ENCOUNTER — OFFICE VISIT (OUTPATIENT)
Dept: HEMATOLOGY/ONCOLOGY | Facility: CLINIC | Age: 68
End: 2023-06-20
Payer: MEDICARE

## 2023-06-20 ENCOUNTER — INFUSION (OUTPATIENT)
Dept: INFUSION THERAPY | Facility: HOSPITAL | Age: 68
End: 2023-06-20
Attending: INTERNAL MEDICINE
Payer: MEDICARE

## 2023-06-20 VITALS
RESPIRATION RATE: 18 BRPM | HEIGHT: 69 IN | DIASTOLIC BLOOD PRESSURE: 62 MMHG | BODY MASS INDEX: 23.45 KG/M2 | OXYGEN SATURATION: 95 % | WEIGHT: 158.31 LBS | TEMPERATURE: 98 F | SYSTOLIC BLOOD PRESSURE: 122 MMHG | HEART RATE: 88 BPM

## 2023-06-20 VITALS
OXYGEN SATURATION: 95 % | DIASTOLIC BLOOD PRESSURE: 62 MMHG | HEART RATE: 88 BPM | HEIGHT: 69 IN | TEMPERATURE: 98 F | RESPIRATION RATE: 18 BRPM | SYSTOLIC BLOOD PRESSURE: 122 MMHG | BODY MASS INDEX: 23.45 KG/M2 | WEIGHT: 158.31 LBS

## 2023-06-20 DIAGNOSIS — D84.81 IMMUNODEFICIENCY SECONDARY TO NEOPLASM: ICD-10-CM

## 2023-06-20 DIAGNOSIS — D49.9 IMMUNODEFICIENCY SECONDARY TO NEOPLASM: ICD-10-CM

## 2023-06-20 DIAGNOSIS — D61.818 PANCYTOPENIA: ICD-10-CM

## 2023-06-20 DIAGNOSIS — D46.9 MYELODYSPLASTIC SYNDROME: ICD-10-CM

## 2023-06-20 DIAGNOSIS — Z76.82 STEM CELL TRANSPLANT CANDIDATE: Primary | ICD-10-CM

## 2023-06-20 DIAGNOSIS — D46.9 MYELODYSPLASTIC SYNDROME: Primary | ICD-10-CM

## 2023-06-20 LAB
HLA HI RES SEQUNCE BASED TYPING TEST DATE: NORMAL
HLA HR DPA1: NORMAL
HLA HR DPA2: NORMAL
HLA HR DPB1: NORMAL
HLA HR DPB2: NORMAL
HLA HR DQA1: NORMAL
HLA HR DQA2: NORMAL
HLA-A1 HI RES: NORMAL
HLA-A2 HI RES: NORMAL
HLA-B1 HI RES: NORMAL
HLA-B2 HI RES: NORMAL
HLA-C1 HI RES: NORMAL
HLA-C2 HI RES: NORMAL
HLA-DQB1 1 HI RES: NORMAL
HLA-DQB1 2 HI RES: NORMAL
HLA-DRB1 1 HI RES: NORMAL
HLA-DRB1 2 HI RES: NORMAL
HLA-DRB3 1 HI RES: NORMAL
HLA-DRB3 2 HI RES: NORMAL
HLA-DRB4 1 HI RES: NORMAL
HLA-DRB4 2 HI RES: NORMAL
HLA-DRB5 1 HI RES: NORMAL
HLA-DRB5 2 HI RES: NORMAL

## 2023-06-20 PROCEDURE — 1126F PR PAIN SEVERITY QUANTIFIED, NO PAIN PRESENT: ICD-10-PCS | Mod: CPTII,S$GLB,, | Performed by: INTERNAL MEDICINE

## 2023-06-20 PROCEDURE — 99999 PR PBB SHADOW E&M-EST. PATIENT-LVL IV: ICD-10-PCS | Mod: PBBFAC,,, | Performed by: INTERNAL MEDICINE

## 2023-06-20 PROCEDURE — 63600175 PHARM REV CODE 636 W HCPCS: Performed by: INTERNAL MEDICINE

## 2023-06-20 PROCEDURE — 1160F RVW MEDS BY RX/DR IN RCRD: CPT | Mod: CPTII,S$GLB,, | Performed by: INTERNAL MEDICINE

## 2023-06-20 PROCEDURE — 1126F AMNT PAIN NOTED NONE PRSNT: CPT | Mod: CPTII,S$GLB,, | Performed by: INTERNAL MEDICINE

## 2023-06-20 PROCEDURE — 3008F PR BODY MASS INDEX (BMI) DOCUMENTED: ICD-10-PCS | Mod: CPTII,S$GLB,, | Performed by: INTERNAL MEDICINE

## 2023-06-20 PROCEDURE — 3074F PR MOST RECENT SYSTOLIC BLOOD PRESSURE < 130 MM HG: ICD-10-PCS | Mod: CPTII,S$GLB,, | Performed by: INTERNAL MEDICINE

## 2023-06-20 PROCEDURE — 1160F PR REVIEW ALL MEDS BY PRESCRIBER/CLIN PHARMACIST DOCUMENTED: ICD-10-PCS | Mod: CPTII,S$GLB,, | Performed by: INTERNAL MEDICINE

## 2023-06-20 PROCEDURE — 25000003 PHARM REV CODE 250: Performed by: INTERNAL MEDICINE

## 2023-06-20 PROCEDURE — 1101F PT FALLS ASSESS-DOCD LE1/YR: CPT | Mod: CPTII,S$GLB,, | Performed by: INTERNAL MEDICINE

## 2023-06-20 PROCEDURE — 3288F PR FALLS RISK ASSESSMENT DOCUMENTED: ICD-10-PCS | Mod: CPTII,S$GLB,, | Performed by: INTERNAL MEDICINE

## 2023-06-20 PROCEDURE — 3008F BODY MASS INDEX DOCD: CPT | Mod: CPTII,S$GLB,, | Performed by: INTERNAL MEDICINE

## 2023-06-20 PROCEDURE — 1159F PR MEDICATION LIST DOCUMENTED IN MEDICAL RECORD: ICD-10-PCS | Mod: CPTII,S$GLB,, | Performed by: INTERNAL MEDICINE

## 2023-06-20 PROCEDURE — 99215 PR OFFICE/OUTPT VISIT, EST, LEVL V, 40-54 MIN: ICD-10-PCS | Mod: S$GLB,,, | Performed by: INTERNAL MEDICINE

## 2023-06-20 PROCEDURE — 99999 PR PBB SHADOW E&M-EST. PATIENT-LVL IV: CPT | Mod: PBBFAC,,, | Performed by: INTERNAL MEDICINE

## 2023-06-20 PROCEDURE — 96401 CHEMO ANTI-NEOPL SQ/IM: CPT

## 2023-06-20 PROCEDURE — 3288F FALL RISK ASSESSMENT DOCD: CPT | Mod: CPTII,S$GLB,, | Performed by: INTERNAL MEDICINE

## 2023-06-20 PROCEDURE — 99215 OFFICE O/P EST HI 40 MIN: CPT | Mod: S$GLB,,, | Performed by: INTERNAL MEDICINE

## 2023-06-20 PROCEDURE — 3078F DIAST BP <80 MM HG: CPT | Mod: CPTII,S$GLB,, | Performed by: INTERNAL MEDICINE

## 2023-06-20 PROCEDURE — 3078F PR MOST RECENT DIASTOLIC BLOOD PRESSURE < 80 MM HG: ICD-10-PCS | Mod: CPTII,S$GLB,, | Performed by: INTERNAL MEDICINE

## 2023-06-20 PROCEDURE — 1101F PR PT FALLS ASSESS DOC 0-1 FALLS W/OUT INJ PAST YR: ICD-10-PCS | Mod: CPTII,S$GLB,, | Performed by: INTERNAL MEDICINE

## 2023-06-20 PROCEDURE — 3074F SYST BP LT 130 MM HG: CPT | Mod: CPTII,S$GLB,, | Performed by: INTERNAL MEDICINE

## 2023-06-20 PROCEDURE — 1159F MED LIST DOCD IN RCRD: CPT | Mod: CPTII,S$GLB,, | Performed by: INTERNAL MEDICINE

## 2023-06-20 RX ORDER — ONDANSETRON HYDROCHLORIDE 8 MG/1
16 TABLET, FILM COATED ORAL
Status: COMPLETED | OUTPATIENT
Start: 2023-06-20 | End: 2023-06-20

## 2023-06-20 RX ORDER — AZACITIDINE 100 MG/1
75 INJECTION, POWDER, LYOPHILIZED, FOR SOLUTION INTRAVENOUS; SUBCUTANEOUS
Status: COMPLETED | OUTPATIENT
Start: 2023-06-20 | End: 2023-06-20

## 2023-06-20 RX ADMIN — ONDANSETRON HYDROCHLORIDE 16 MG: 8 TABLET, FILM COATED ORAL at 11:06

## 2023-06-20 RX ADMIN — AZACITIDINE 140 MG: 100 INJECTION, POWDER, LYOPHILIZED, FOR SOLUTION INTRAVENOUS; SUBCUTANEOUS at 12:06

## 2023-06-20 NOTE — PROGRESS NOTES
Section of Hematology and Stem Cell Transplantation  Follow Up Visit     Date of visit: 6/20/23  Visit diagnosis: Stem cell transplant candidate [Z76.82]  Referred by:  Harry Diamond MD    Oncologic History:     Primary Oncologic Diagnosis: Myelodysplastic syndrome excess blasts 2, IPSS-M very high risk    4/12/23: Initial evaluation by Dr. Diamond of anemia. WBC 2.71, Hgb 7.1, Plts  400. Prior to this visit, he was in [place] for a dental procedure. His symptoms of fatigue started after extractions. Workup by Dr. Diamond unremarkable.   4/26/23: Bone marrow biopsy revealed a hypercellular marrow (80-90%) with increased blasts (8-12%) concerning for MDS EB2. However, sample was limited.   5/23/23: Repeat bone marrow biopsy revealed myelodysplastic syndrome with excess blasts 2 (blasts 16-17%). 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 NGS with ASXL1 (44%), EZH2 (87%), IDH2 (42%), STAG2 (89%), U2AF1 (3%).  6/12/23: Cycle 1 of azacitidine 75 mg/m2 plus venetoclax x14 of 28 days.     History of Present Ilness:   Guillaume Slainas (Guillaume) is a pleasant 67 y.o.male with PVD, CAD, HTN who presents for follow up. He is tolerating treatment well at this time. He is having some pain at the injection site. He endorses constipation. No recent fevers, chills, nausea, vomiting.     Patient was seen today in conjunction with a .    PAST MEDICAL HISTORY:   Past Medical History:   Diagnosis Date    Anticoagulant long-term use     Coronary artery disease     Hypertension     Peripheral vascular disease, unspecified        PAST SURGICAL HISTORY:   Past Surgical History:   Procedure Laterality Date    BONE MARROW BIOPSY Left 4/26/2023    Procedure: Biopsy-bone marrow;  Surgeon: Harry Diamond MD;  Location: [facility] OR;  Service: Oncology;  Laterality: Left;    COLONOSCOPY N/A 01/05/2022    Procedure: COLONOSCOPY Golytely Vaccinated will bring cards;  Surgeon: Dereje Simon MD;  Location: [facility] ENDO;   Service: Endoscopy;  Laterality: N/A;  Do not cancel this order       PAST SOCIAL HISTORY:  Social History     Tobacco Use    Smoking status: Former     Packs/day: 0.25     Years: 50.00     Pack years: 12.50     Types: Cigarettes     Quit date: 3/1/2023     Years since quittin.3    Smokeless tobacco: Never    Tobacco comments:     pt does not want to quit   Substance Use Topics    Alcohol use: Not Currently    Drug use: Never       FAMILY HISTORY:  Family History   Problem Relation Age of Onset    Hypertension Mother     Cancer Father     Cancer Brother     No Known Problems Daughter     No Known Problems Daughter     No Known Problems Son        CURRENT MEDICATIONS:   Current Outpatient Medications   Medication Sig    acyclovir (ZOVIRAX) 400 MG tablet Take 1 tablet (400 mg total) by mouth 2 (two) times daily.    allopurinoL (ZYLOPRIM) 300 MG tablet Take 1 tablet (300 mg total) by mouth 2 (two) times daily.    aspirin (ECOTRIN) 81 MG EC tablet Take 1 tablet (81 mg total) by mouth once daily.    atorvastatin (LIPITOR) 80 MG tablet Take 1 tablet (80 mg total) by mouth once daily.    carvediloL (COREG) 6.25 MG tablet TAKE 1 TABLET(6.25 MG) BY MOUTH TWICE DAILY WITH MEALS    cilostazoL (PLETAL) 50 MG Tab Take 1 tablet (50 mg total) by mouth 2 (two) times daily.    gabapentin (NEURONTIN) 300 MG capsule Take 1 capsule (300 mg total) by mouth 3 (three) times daily.    levoFLOXacin (LEVAQUIN) 500 MG tablet Take 1 tablet (500 mg total) by mouth once daily.    posaconazole (NOXAFIL) 100 mg TbEC tablet Take 3 tablets (300 mg total) by mouth once daily.    promethazine (PHENERGAN) 25 MG tablet Take 1 tablet (25 mg total) by mouth every 8 (eight) hours as needed for Nausea.    venetoclax (VENCLEXTA) 100 mg Tab Take 1 tablet (100 mg) by mouth once daily on days 3-14 of a 28-day cycle (FOR CYCLE 1 ONLY). Do not discard any remaining tablets.    venetoclax (VENCLEXTA) 100 mg Tab Take 1 tablet (100 mg) by mouth once daily on  days 1-14 of a 28-day cycle. Do not discard any remaining tablets.    voriconazole (VFEND) 200 MG Tab Take 1 tablet (200 mg total) by mouth 2 (two) times daily.    zolpidem (AMBIEN) 10 mg Tab Take 10 mg by mouth nightly as needed.     No current facility-administered medications for this visit.       ALLERGIES:   Review of patient's allergies indicates:  No Known Allergies    Review of Systems:     Pertinent positives and negatives included in the HPI. Otherwise a complete review of systems is negative.    Physical Exam:     Vitals:    06/20/23 0837   BP: 122/62   Pulse: 88   Resp: 18   Temp: 98.3 °F (36.8 °C)     General: Appears well, NAD  Pulmonary: CTAB, no increased work of breathing, no W/R/C  Cardiovascular: S1S2 normal, RRR, no M/R/G  Abdominal: Soft, NT, ND, BS+, no HSM  Extremities: No C/C/E  Neurological: AAOx4, grossly normal, no focal deficits  Dermatologic: No appreciable rashes or lesions  Lymphatic: No cervical, axillary, or inguinal lymphadenopathy     ECOG Performance Status: (foot note - ECOG PS provided by Eastern Cooperative Oncology Group) 1 - Symptomatic but completely ambulatory    Karnofsky Performance Score:  80%- Normal Activity with Effort: Some Symptoms of Disease    Labs:   Lab Results   Component Value Date    WBC 2.87 (L) 06/14/2023    RBC 2.70 (L) 06/14/2023    HGB 7.9 (L) 06/14/2023    HCT 24.6 (L) 06/14/2023    MCV 91 06/14/2023    MCH 29.3 06/14/2023    MCHC 32.1 06/14/2023    RDW 14.4 06/14/2023     (H) 06/14/2023    MPV 10.6 06/14/2023    GRAN 46.0 06/14/2023    LYMPH CANCELED 06/14/2023    LYMPH 37.0 06/14/2023    MONO CANCELED 06/14/2023    MONO 3.0 (L) 06/14/2023    EOS CANCELED 06/14/2023    BASO CANCELED 06/14/2023    EOSINOPHIL 11.0 (H) 06/14/2023    BASOPHIL 2.0 (H) 06/14/2023       CMP  Sodium   Date Value Ref Range Status   06/14/2023 138 136 - 145 mmol/L Final     Potassium   Date Value Ref Range Status   06/14/2023 4.3 3.5 - 5.1 mmol/L Final     Chloride   Date  Value Ref Range Status   06/14/2023 104 95 - 110 mmol/L Final     CO2   Date Value Ref Range Status   06/14/2023 25 23 - 29 mmol/L Final     Glucose   Date Value Ref Range Status   06/14/2023 134 (H) 70 - 110 mg/dL Final     BUN   Date Value Ref Range Status   06/14/2023 23 8 - 23 mg/dL Final     Creatinine   Date Value Ref Range Status   06/14/2023 1.4 0.5 - 1.4 mg/dL Final     Calcium   Date Value Ref Range Status   06/14/2023 9.6 8.7 - 10.5 mg/dL Final     Total Protein   Date Value Ref Range Status   06/14/2023 7.0 6.0 - 8.4 g/dL Final     Albumin   Date Value Ref Range Status   06/14/2023 3.7 3.5 - 5.2 g/dL Final     Total Bilirubin   Date Value Ref Range Status   06/14/2023 0.6 0.1 - 1.0 mg/dL Final     Comment:     For infants and newborns, interpretation of results should be based  on gestational age, weight and in agreement with clinical  observations.    Premature Infant recommended reference ranges:  Up to 24 hours.............<8.0 mg/dL  Up to 48 hours............<12.0 mg/dL  3-5 days..................<15.0 mg/dL  6-29 days.................<15.0 mg/dL       Alkaline Phosphatase   Date Value Ref Range Status   06/14/2023 62 55 - 135 U/L Final     AST   Date Value Ref Range Status   06/14/2023 16 10 - 40 U/L Final     ALT   Date Value Ref Range Status   06/14/2023 22 10 - 44 U/L Final     Anion Gap   Date Value Ref Range Status   06/14/2023 9 8 - 16 mmol/L Final     eGFR if    Date Value Ref Range Status   08/27/2021 >60 >60 mL/min/1.73 m^2 Final   08/27/2021 >60 >60 mL/min/1.73 m^2 Final   08/27/2021 >60 >60 mL/min/1.73 m^2 Final     eGFR if non    Date Value Ref Range Status   08/27/2021 57 (A) >60 mL/min/1.73 m^2 Final     Comment:     Calculation used to obtain the estimated glomerular filtration  rate (eGFR) is the CKD-EPI equation.      08/27/2021 57 (A) >60 mL/min/1.73 m^2 Final     Comment:     Calculation used to obtain the estimated glomerular filtration  rate  (eGFR) is the CKD-EPI equation.      08/27/2021 57 (A) >60 mL/min/1.73 m^2 Final     Comment:     Calculation used to obtain the estimated glomerular filtration  rate (eGFR) is the CKD-EPI equation.          Imaging:   No results found for this or any previous visit (from the past 2160 hour(s)).    Pathology:  Reviewed     Assessment and Plan:   Guillaume Salinas (Guillaume) is a pleasant 67 y.o.male with PVD, CAD, HTN who presents for follow up.    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 CG/FISH/NGS pending. I reviewed with him and his family the aggressive nature of this disease, including natural history, prognosis, and treatment options. I recommended treatment with azacitidine 75 mg/m2 daily x7 days plus venetoclax 100mg (voriconazole) daily x14 of 28 days.  Continue azacitidine plus venetoclax x14 of 28 days as above.   Repeat bone marrow biopsy at the end of cycle 1 (7/3/23).    Stem cell transplant candidate: We discussed the role for allogeneic stem cell transplantation in this disease process as a potentially curative option. We had an extensive discussion about the rationale, logistics, risks, and benefits. We reviewed the requirement to stay in the [place] area for 100 days with a caregiver at all times. We discussed the risks, including infection, graft failure, organ toxicity, graft versus host disease, relapse of disease, and secondary cancers. We reviewed the need for long-term immunosuppression and need for close monitoring. HCT-CI of 1 (intermediate risk). Will plan to proceed with transplant in the next 2-3 months (as soon as a donor is identified). HLA typing sent.   Coordinator: Fay Stephens  Regimen: pending donor  Donor: pending  Graft source: pending donor    Stem cell donors: 10 siblings (9 are over age 65). One sister (age 63) - Radha. Cesar (age 43), Padmini Rush (age 39), Susy (age 35).   Will type sister, children, and search for  MUD.    Symptomatic anemia: Related to MDS. Twice weekly monitoring in [place]. Transfuse for Hgb <7.    Thrombocytopenia: Related to MDS. Monitor and transfuse for Plts <10.    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     At high risk for tumor lysis syndrome: Continue allopurinol 300mg BID.    Orders/Follow Up:      Orders Placed This Encounter    Bone marrow    Myelodysplastic Syndrome (MDS), FISH, Bone Marrow    Leukemia/Lymphoma Screen - Bone Marrow Right Posterior Iliac Crest    Heme Disorders DNA/RNA Hold, Bone Marrow    OncoHeme (NGS) Hematologic Neoplasms, BM Diagnosis or Indication for test: Myelodysplastic syndrome excess blasts 2    Chromosome Analysis, Bone Marrow Right Posterior Iliac Crest    Specimen to Pathology, Bone Marrow Aspiration/Biopsy     Route Chart for Scheduling    BMT Chart Routing  Urgent    Follow up with physician 3 weeks. RTC around 7/10/23   Follow up with [facility]    Provider visit type Malignant hem   Infusion scheduling note    Injection scheduling note    Labs CBC, CMP, type and screen, phosphorus and magnesium   Scheduling:  Preferred lab:  Lab interval:  prior to visit   Imaging None      Pharmacy appointment No pharmacy appointment needed      Other referrals no referral to Oncology Primary Care needed -    No additional referrals needed         Treatment Plan Information   OP AZACITIDINE 7-DAY (SUB-Q)   Harry Diamond MD   Upcoming Treatment Dates - OP AZACITIDINE 7-DAY (SUB-Q)    7/10/2023       Pre-Medications       ondansetron tablet 16 mg       Chemotherapy       azaCITIDine (VIDAZA) chemo injection 140 mg  7/11/2023       Pre-Medications       ondansetron tablet 16 mg       Chemotherapy       azaCITIDine (VIDAZA) chemo injection 140 mg  7/12/2023       Pre-Medications       ondansetron tablet 16 mg       Chemotherapy       azaCITIDine (VIDAZA) chemo injection 140 mg  7/13/2023        Pre-Medications       ondansetron tablet 16 mg       Chemotherapy       azaCITIDine (VIDAZA) chemo injection 140 mg    Advance Care Planning   Date: 05/08/2023  We reviewed his underlying diagnosis including natural history, prognosis, and various treatment options. He remains interested in pursuing any and all treatment options in an effort to improve his quality and quantity of life. Will discuss treatment options pending biopsy results.        Total time of this visit was 40 minutes, including time spent face to face with patient, and also in preparing for today's visit for MDM and documentation. (Medical Decision Making, including consideration of possible diagnoses, management options, complex medical record review, review of diagnostic tests and information, consideration and discussion of significant complications based on comorbidities, and discussion with providers involved with the care of the patient). Greater than 50% was spent face to face with the patient counseling and coordinating care.    Paco Hickey MD  Hematology, Oncology, and Stem Cell Transplantation  [facility] and [facility]

## 2023-06-21 ENCOUNTER — LAB VISIT (OUTPATIENT)
Dept: LAB | Facility: HOSPITAL | Age: 68
End: 2023-06-21
Attending: INTERNAL MEDICINE
Payer: MEDICARE

## 2023-06-21 ENCOUNTER — EDUCATION (OUTPATIENT)
Dept: HEMATOLOGY/ONCOLOGY | Facility: CLINIC | Age: 68
End: 2023-06-21
Payer: MEDICARE

## 2023-06-21 DIAGNOSIS — D46.9 MYELODYSPLASTIC SYNDROME: ICD-10-CM

## 2023-06-21 LAB
ABO + RH BLD: NORMAL
ALBUMIN SERPL BCP-MCNC: 3.8 G/DL (ref 3.5–5.2)
ALP SERPL-CCNC: 66 U/L (ref 55–135)
ALT SERPL W/O P-5'-P-CCNC: 18 U/L (ref 10–44)
ANION GAP SERPL CALC-SCNC: 7 MMOL/L (ref 8–16)
AST SERPL-CCNC: 16 U/L (ref 10–40)
BASOPHILS # BLD AUTO: ABNORMAL K/UL (ref 0–0.2)
BASOPHILS NFR BLD: 0 % (ref 0–1.9)
BILIRUB SERPL-MCNC: 0.6 MG/DL (ref 0.1–1)
BLD GP AB SCN CELLS X3 SERPL QL: NORMAL
BUN SERPL-MCNC: 23 MG/DL (ref 8–23)
CALCIUM SERPL-MCNC: 9.7 MG/DL (ref 8.7–10.5)
CHLORIDE SERPL-SCNC: 104 MMOL/L (ref 95–110)
CO2 SERPL-SCNC: 26 MMOL/L (ref 23–29)
CREAT SERPL-MCNC: 1.3 MG/DL (ref 0.5–1.4)
DIFFERENTIAL METHOD: ABNORMAL
EOSINOPHIL # BLD AUTO: ABNORMAL K/UL (ref 0–0.5)
EOSINOPHIL NFR BLD: 1 % (ref 0–8)
ERYTHROCYTE [DISTWIDTH] IN BLOOD BY AUTOMATED COUNT: 13.7 % (ref 11.5–14.5)
EST. GFR  (NO RACE VARIABLE): >60 ML/MIN/1.73 M^2
GLUCOSE SERPL-MCNC: 134 MG/DL (ref 70–110)
HCT VFR BLD AUTO: 21.2 % (ref 40–54)
HGB BLD-MCNC: 7.1 G/DL (ref 14–18)
IMM GRANULOCYTES # BLD AUTO: ABNORMAL K/UL (ref 0–0.04)
IMM GRANULOCYTES NFR BLD AUTO: ABNORMAL % (ref 0–0.5)
LDH SERPL L TO P-CCNC: 93 U/L (ref 110–260)
LYMPHOCYTES # BLD AUTO: ABNORMAL K/UL (ref 1–4.8)
LYMPHOCYTES NFR BLD: 53 % (ref 18–48)
MCH RBC QN AUTO: 29.7 PG (ref 27–31)
MCHC RBC AUTO-ENTMCNC: 33.5 G/DL (ref 32–36)
MCV RBC AUTO: 89 FL (ref 82–98)
MONOCYTES # BLD AUTO: ABNORMAL K/UL (ref 0.3–1)
MONOCYTES NFR BLD: 4 % (ref 4–15)
NEUTROPHILS NFR BLD: 42 % (ref 38–73)
NRBC BLD-RTO: 0 /100 WBC
PLATELET # BLD AUTO: 304 K/UL (ref 150–450)
PLATELET BLD QL SMEAR: ABNORMAL
PMV BLD AUTO: 10.3 FL (ref 9.2–12.9)
POTASSIUM SERPL-SCNC: 4.3 MMOL/L (ref 3.5–5.1)
PROT SERPL-MCNC: 7.3 G/DL (ref 6–8.4)
RBC # BLD AUTO: 2.39 M/UL (ref 4.6–6.2)
SODIUM SERPL-SCNC: 137 MMOL/L (ref 136–145)
SPECIMEN OUTDATE: NORMAL
URATE SERPL-MCNC: 2.5 MG/DL (ref 3.4–7)
WBC # BLD AUTO: 2.39 K/UL (ref 3.9–12.7)

## 2023-06-21 PROCEDURE — 84550 ASSAY OF BLOOD/URIC ACID: CPT | Performed by: INTERNAL MEDICINE

## 2023-06-21 PROCEDURE — 80053 COMPREHEN METABOLIC PANEL: CPT | Performed by: INTERNAL MEDICINE

## 2023-06-21 PROCEDURE — 85027 COMPLETE CBC AUTOMATED: CPT | Performed by: INTERNAL MEDICINE

## 2023-06-21 PROCEDURE — 83615 LACTATE (LD) (LDH) ENZYME: CPT | Performed by: INTERNAL MEDICINE

## 2023-06-21 PROCEDURE — 85007 BL SMEAR W/DIFF WBC COUNT: CPT | Performed by: INTERNAL MEDICINE

## 2023-06-21 PROCEDURE — 86900 BLOOD TYPING SEROLOGIC ABO: CPT | Performed by: INTERNAL MEDICINE

## 2023-06-21 NOTE — PROGRESS NOTES
Met with patient and spouse and daughter to discuss allogeneic stem cell transplant. Discussed with patient pre-screening requirements. Educated patient on the evaluation process, line placement, stem cell collection, and the hospitalization including current visitor's policy. Reviewed with patient lodging and caregiver requirements following transplant as well as the need for follow-up and immunizations. Donor names and numbers obtained from patient; patient to discuss with children prior to their being called. Reading material provided; patient instructed to reach out with further questions.

## 2023-06-27 ENCOUNTER — PATIENT MESSAGE (OUTPATIENT)
Dept: PHARMACY | Facility: CLINIC | Age: 68
End: 2023-06-27
Payer: MEDICARE

## 2023-06-28 ENCOUNTER — TELEPHONE (OUTPATIENT)
Dept: FAMILY MEDICINE | Facility: HOSPITAL | Age: 68
End: 2023-06-28
Payer: MEDICARE

## 2023-06-28 ENCOUNTER — TELEPHONE (OUTPATIENT)
Dept: HEMATOLOGY/ONCOLOGY | Facility: CLINIC | Age: 68
End: 2023-06-28
Payer: MEDICARE

## 2023-06-28 ENCOUNTER — PATIENT MESSAGE (OUTPATIENT)
Dept: HEMATOLOGY/ONCOLOGY | Facility: CLINIC | Age: 68
End: 2023-06-28
Payer: MEDICARE

## 2023-06-28 ENCOUNTER — LAB VISIT (OUTPATIENT)
Dept: LAB | Facility: HOSPITAL | Age: 68
End: 2023-06-28
Attending: INTERNAL MEDICINE
Payer: MEDICARE

## 2023-06-28 ENCOUNTER — DOCUMENTATION ONLY (OUTPATIENT)
Dept: HEMATOLOGY/ONCOLOGY | Facility: CLINIC | Age: 68
End: 2023-06-28
Payer: MEDICARE

## 2023-06-28 DIAGNOSIS — D46.9 MYELODYSPLASTIC SYNDROME: ICD-10-CM

## 2023-06-28 DIAGNOSIS — D46.9 MYELODYSPLASTIC SYNDROME: Primary | ICD-10-CM

## 2023-06-28 DIAGNOSIS — D64.9 SYMPTOMATIC ANEMIA: Primary | ICD-10-CM

## 2023-06-28 LAB
ABO + RH BLD: NORMAL
ALBUMIN SERPL BCP-MCNC: 3.6 G/DL (ref 3.5–5.2)
ALP SERPL-CCNC: 82 U/L (ref 55–135)
ALT SERPL W/O P-5'-P-CCNC: 28 U/L (ref 10–44)
ANION GAP SERPL CALC-SCNC: 9 MMOL/L (ref 8–16)
ANISOCYTOSIS BLD QL SMEAR: SLIGHT
AST SERPL-CCNC: 23 U/L (ref 10–40)
BASOPHILS NFR BLD: 0 % (ref 0–1.9)
BILIRUB SERPL-MCNC: 0.3 MG/DL (ref 0.1–1)
BLD GP AB SCN CELLS X3 SERPL QL: NORMAL
BUN SERPL-MCNC: 18 MG/DL (ref 8–23)
CALCIUM SERPL-MCNC: 9.2 MG/DL (ref 8.7–10.5)
CHLORIDE SERPL-SCNC: 107 MMOL/L (ref 95–110)
CO2 SERPL-SCNC: 24 MMOL/L (ref 23–29)
CREAT SERPL-MCNC: 0.8 MG/DL (ref 0.5–1.4)
DIFFERENTIAL METHOD: ABNORMAL
EOSINOPHIL NFR BLD: 0 % (ref 0–8)
ERYTHROCYTE [DISTWIDTH] IN BLOOD BY AUTOMATED COUNT: 13.1 % (ref 11.5–14.5)
EST. GFR  (NO RACE VARIABLE): >60 ML/MIN/1.73 M^2
GLUCOSE SERPL-MCNC: 146 MG/DL (ref 70–110)
HCT VFR BLD AUTO: 18.9 % (ref 40–54)
HGB BLD-MCNC: 6.4 G/DL (ref 14–18)
HYPOCHROMIA BLD QL SMEAR: ABNORMAL
IMM GRANULOCYTES # BLD AUTO: ABNORMAL K/UL (ref 0–0.04)
IMM GRANULOCYTES NFR BLD AUTO: ABNORMAL % (ref 0–0.5)
LDH SERPL L TO P-CCNC: 112 U/L (ref 110–260)
LYMPHOCYTES NFR BLD: 62 % (ref 18–48)
MCH RBC QN AUTO: 30 PG (ref 27–31)
MCHC RBC AUTO-ENTMCNC: 33.9 G/DL (ref 32–36)
MCV RBC AUTO: 89 FL (ref 82–98)
MONOCYTES NFR BLD: 0 % (ref 4–15)
NEUTROPHILS NFR BLD: 36 % (ref 38–73)
NEUTS BAND NFR BLD MANUAL: 2 %
NRBC BLD-RTO: 0 /100 WBC
OVALOCYTES BLD QL SMEAR: ABNORMAL
PLATELET # BLD AUTO: 33 K/UL (ref 150–450)
PLATELET BLD QL SMEAR: ABNORMAL
PMV BLD AUTO: 10.7 FL (ref 9.2–12.9)
POTASSIUM SERPL-SCNC: 4 MMOL/L (ref 3.5–5.1)
PROT SERPL-MCNC: 7 G/DL (ref 6–8.4)
RBC # BLD AUTO: 2.13 M/UL (ref 4.6–6.2)
SODIUM SERPL-SCNC: 140 MMOL/L (ref 136–145)
SPECIMEN OUTDATE: NORMAL
URATE SERPL-MCNC: 2.6 MG/DL (ref 3.4–7)
WBC # BLD AUTO: 1.95 K/UL (ref 3.9–12.7)

## 2023-06-28 PROCEDURE — 85027 COMPLETE CBC AUTOMATED: CPT | Performed by: INTERNAL MEDICINE

## 2023-06-28 PROCEDURE — 84550 ASSAY OF BLOOD/URIC ACID: CPT | Performed by: INTERNAL MEDICINE

## 2023-06-28 PROCEDURE — 86900 BLOOD TYPING SEROLOGIC ABO: CPT | Performed by: INTERNAL MEDICINE

## 2023-06-28 PROCEDURE — 85007 BL SMEAR W/DIFF WBC COUNT: CPT | Performed by: INTERNAL MEDICINE

## 2023-06-28 PROCEDURE — 83615 LACTATE (LD) (LDH) ENZYME: CPT | Performed by: INTERNAL MEDICINE

## 2023-06-28 PROCEDURE — 80053 COMPREHEN METABOLIC PANEL: CPT | Performed by: INTERNAL MEDICINE

## 2023-06-28 RX ORDER — HYDROCODONE BITARTRATE AND ACETAMINOPHEN 500; 5 MG/1; MG/1
TABLET ORAL ONCE
Status: CANCELLED | OUTPATIENT
Start: 2023-06-28 | End: 2023-06-28

## 2023-06-28 RX ORDER — ACETAMINOPHEN 325 MG/1
650 TABLET ORAL
Status: CANCELLED | OUTPATIENT
Start: 2023-06-28

## 2023-06-28 RX ORDER — DIPHENHYDRAMINE HCL 25 MG
25 CAPSULE ORAL
Status: CANCELLED | OUTPATIENT
Start: 2023-06-28

## 2023-06-28 NOTE — TELEPHONE ENCOUNTER
Lucila Called from [facility] with a critical lab value of WBC-1.95, Hematocrit- 18.9, Platelets-33         . High Priority message sent to Dr. Harry Diamond  and staff. Value entered in Flowsheets.

## 2023-06-28 NOTE — TELEPHONE ENCOUNTER
Pt's wife called regarding a missed call. Informed pt's wife that I spoke to pt earlier today 6/28 regarding type/screen and blood transfusion appt. Pt's wife stated pt is very weak and asked if the infusion center had any earlier appointments. Informed her we reached  out to various locations for infusion centers but no one had any availability sooner. Advised her if pt starts to feel worse before Monday 7/3, then pt should go to ER. Pt's wife verbalized understanding.

## 2023-06-28 NOTE — TELEPHONE ENCOUNTER
----- Message from Leah Green sent at 6/28/2023  1:57 PM CDT -----  Type:  Patient Returning Call    Who Called:spouse   Who Left Message for Patient:Elvira Marjorie  Does the patient know what this is regarding?:care  Would the patient rather a call back or a response via [employer]? call  Best Call Back Number:[phone]  Additional Information:

## 2023-06-29 ENCOUNTER — OFFICE VISIT (OUTPATIENT)
Dept: HOME HEALTH SERVICES | Facility: CLINIC | Age: 68
End: 2023-06-29
Payer: MEDICARE

## 2023-06-29 VITALS
BODY MASS INDEX: 24.88 KG/M2 | DIASTOLIC BLOOD PRESSURE: 62 MMHG | WEIGHT: 168 LBS | HEART RATE: 94 BPM | HEIGHT: 69 IN | SYSTOLIC BLOOD PRESSURE: 108 MMHG

## 2023-06-29 DIAGNOSIS — D69.6 THROMBOCYTOPENIA: ICD-10-CM

## 2023-06-29 DIAGNOSIS — E78.2 MIXED HYPERLIPIDEMIA: ICD-10-CM

## 2023-06-29 DIAGNOSIS — D46.9 MYELODYSPLASTIC SYNDROME: ICD-10-CM

## 2023-06-29 DIAGNOSIS — D84.81 IMMUNODEFICIENCY SECONDARY TO NEOPLASM: ICD-10-CM

## 2023-06-29 DIAGNOSIS — Z00.00 ENCOUNTER FOR PREVENTIVE HEALTH EXAMINATION: Primary | ICD-10-CM

## 2023-06-29 DIAGNOSIS — I25.10 CORONARY ARTERY DISEASE INVOLVING NATIVE CORONARY ARTERY OF NATIVE HEART WITHOUT ANGINA PECTORIS: ICD-10-CM

## 2023-06-29 DIAGNOSIS — I70.0 AORTIC ATHEROSCLEROSIS: ICD-10-CM

## 2023-06-29 DIAGNOSIS — D49.9 IMMUNODEFICIENCY SECONDARY TO NEOPLASM: ICD-10-CM

## 2023-06-29 DIAGNOSIS — D61.818 PANCYTOPENIA: ICD-10-CM

## 2023-06-29 DIAGNOSIS — I70.213 ATHEROSCLEROSIS OF NATIVE ARTERY OF BOTH LOWER EXTREMITIES WITH INTERMITTENT CLAUDICATION: ICD-10-CM

## 2023-06-29 PROBLEM — R94.4 DECREASED GFR: Status: RESOLVED | Noted: 2022-03-03 | Resolved: 2023-06-29

## 2023-06-29 PROCEDURE — 3078F DIAST BP <80 MM HG: CPT | Mod: CPTII,S$GLB,, | Performed by: NURSE PRACTITIONER

## 2023-06-29 PROCEDURE — 1160F RVW MEDS BY RX/DR IN RCRD: CPT | Mod: CPTII,S$GLB,, | Performed by: NURSE PRACTITIONER

## 2023-06-29 PROCEDURE — 1101F PT FALLS ASSESS-DOCD LE1/YR: CPT | Mod: CPTII,S$GLB,, | Performed by: NURSE PRACTITIONER

## 2023-06-29 PROCEDURE — G0439 PR MEDICARE ANNUAL WELLNESS SUBSEQUENT VISIT: ICD-10-PCS | Mod: S$GLB,,, | Performed by: NURSE PRACTITIONER

## 2023-06-29 PROCEDURE — 3008F PR BODY MASS INDEX (BMI) DOCUMENTED: ICD-10-PCS | Mod: CPTII,S$GLB,, | Performed by: NURSE PRACTITIONER

## 2023-06-29 PROCEDURE — 1170F FXNL STATUS ASSESSED: CPT | Mod: CPTII,S$GLB,, | Performed by: NURSE PRACTITIONER

## 2023-06-29 PROCEDURE — 1159F MED LIST DOCD IN RCRD: CPT | Mod: CPTII,S$GLB,, | Performed by: NURSE PRACTITIONER

## 2023-06-29 PROCEDURE — 1160F PR REVIEW ALL MEDS BY PRESCRIBER/CLIN PHARMACIST DOCUMENTED: ICD-10-PCS | Mod: CPTII,S$GLB,, | Performed by: NURSE PRACTITIONER

## 2023-06-29 PROCEDURE — 3288F PR FALLS RISK ASSESSMENT DOCUMENTED: ICD-10-PCS | Mod: CPTII,S$GLB,, | Performed by: NURSE PRACTITIONER

## 2023-06-29 PROCEDURE — 3078F PR MOST RECENT DIASTOLIC BLOOD PRESSURE < 80 MM HG: ICD-10-PCS | Mod: CPTII,S$GLB,, | Performed by: NURSE PRACTITIONER

## 2023-06-29 PROCEDURE — 3008F BODY MASS INDEX DOCD: CPT | Mod: CPTII,S$GLB,, | Performed by: NURSE PRACTITIONER

## 2023-06-29 PROCEDURE — 3288F FALL RISK ASSESSMENT DOCD: CPT | Mod: CPTII,S$GLB,, | Performed by: NURSE PRACTITIONER

## 2023-06-29 PROCEDURE — 1101F PR PT FALLS ASSESS DOC 0-1 FALLS W/OUT INJ PAST YR: ICD-10-PCS | Mod: CPTII,S$GLB,, | Performed by: NURSE PRACTITIONER

## 2023-06-29 PROCEDURE — 1170F PR FUNCTIONAL STATUS ASSESSED: ICD-10-PCS | Mod: CPTII,S$GLB,, | Performed by: NURSE PRACTITIONER

## 2023-06-29 PROCEDURE — G0439 PPPS, SUBSEQ VISIT: HCPCS | Mod: S$GLB,,, | Performed by: NURSE PRACTITIONER

## 2023-06-29 PROCEDURE — 3074F PR MOST RECENT SYSTOLIC BLOOD PRESSURE < 130 MM HG: ICD-10-PCS | Mod: CPTII,S$GLB,, | Performed by: NURSE PRACTITIONER

## 2023-06-29 PROCEDURE — 3074F SYST BP LT 130 MM HG: CPT | Mod: CPTII,S$GLB,, | Performed by: NURSE PRACTITIONER

## 2023-06-29 PROCEDURE — 1159F PR MEDICATION LIST DOCUMENTED IN MEDICAL RECORD: ICD-10-PCS | Mod: CPTII,S$GLB,, | Performed by: NURSE PRACTITIONER

## 2023-06-29 RX ORDER — DOCUSATE SODIUM 100 MG/1
100 CAPSULE, LIQUID FILLED ORAL 2 TIMES DAILY PRN
COMMUNITY

## 2023-06-29 NOTE — PATIENT INSTRUCTIONS
Counseling and Referral of Other Preventative  (Italic type indicates deductible and co-insurance are waived)    Patient Name: Guillaume Salinas  Today's Date: 6/29/2023    Health Maintenance       Date Due Completion Date    TETANUS VACCINE Never done ---    Shingles Vaccine (1 of 2) Never done ---    Pneumococcal Vaccines (Age 65+) (2 - PPSV23 if available, else PCV20) 12/14/2021 10/19/2021    COVID-19 Vaccine (4 - Booster for Moderna series) 12/24/2021 10/29/2021    Influenza Vaccine (Season Ended) 09/01/2023 10/19/2021    High Dose Statin 06/20/2024 6/20/2023    Aspirin/Antiplatelet Therapy 06/29/2024 6/29/2023    Colorectal Cancer Screening 01/05/2025 1/5/2022    Lipid Panel 04/17/2028 4/17/2023        No orders of the defined types were placed in this encounter.      The following information is provided to all patients.  This information is to help you find resources for any of the problems found today that may be affecting your health:                Living healthy guide: www.[facility].[place].gov      Understanding Diabetes: www.diabetes.org      Eating healthy: www.cdc.gov/healthyweight      CDC home safety checklist: www.cdc.gov/steadi/patient.html      Agency on Aging: www.goea.[place].[facility]      Alcoholics anonymous (AA): www.aa.org      Physical Activity: www.susanne.nih.gov/ld9kqsd      Tobacco use: www.quitwithusla.org

## 2023-06-29 NOTE — PROGRESS NOTES
"Guillaume Salinas presented for a  Medicare AWV and comprehensive Health Risk Assessment today. The following components were reviewed and updated:    Medical history  Family History  Social history  Allergies and Current Medications  Health Risk Assessment  Health Maintenance  Care Team         ** See Completed Assessments for Annual Wellness Visit within the encounter summary.**         The following assessments were completed:  Living Situation  CAGE  Depression Screening  Timed Get Up and Go  Whisper Test  Cognitive Function Screening      Nutrition Screening  ADL Screening  PAQ Screening        Vitals:    06/29/23 0859   BP: 108/62   Pulse: 94   Weight: 76.2 kg (168 lb)   Height: 5' 9" (1.753 m)     Body mass index is 24.81 kg/m².  Physical Exam  Constitutional:       Appearance: Normal appearance.   HENT:      Head: Normocephalic and atraumatic.      Nose: Nose normal.      Mouth/Throat:      Mouth: Mucous membranes are moist.   Eyes:      Extraocular Movements: Extraocular movements intact.   Cardiovascular:      Rate and Rhythm: Normal rate and regular rhythm.      Heart sounds: Normal heart sounds.   Pulmonary:      Effort: Pulmonary effort is normal. No respiratory distress.      Breath sounds: Normal breath sounds.   Abdominal:      General: Bowel sounds are normal. There is no distension.      Palpations: Abdomen is soft.   Musculoskeletal:         General: No swelling. Normal range of motion.      Cervical back: Normal range of motion.   Skin:     General: Skin is warm and dry.   Neurological:      General: No focal deficit present.      Mental Status: He is alert and oriented to person, place, and time.   Psychiatric:         Mood and Affect: Mood normal.         Behavior: Behavior normal.             Diagnoses and health risks identified today and associated recommendations/orders:    1. Encounter for preventive health examination  Assessments completed. Preventive measures and health " maintenance reviewed with patient and patients spouse, whom speaks and understands English.  Review for opioid screening: Patient does not have any prescriptions for opioids.  Review for substance use disorder: Patient does not use any substances.    2. Myelodysplastic syndrome  Stable, patient on Venclexta. Followed by Hematology/Oncology.    3. Immunodeficiency secondary to neoplasm  Stable, patient on oral Antifungals and Antibiotics. Followed by Hematology/Oncology.    4. Aortic atherosclerosis  Stable, patient on Aspirin.    5. Pancytopenia  Stable, followed by Hematology/Oncology.    6. Thrombocytopenia  Stable, followed by PCP.    7. Atherosclerosis of native artery of both lower extremities with intermittent claudication  Stable, patient on Pletal. Followed by Cardiology.    8. Coronary artery disease involving native coronary artery of native heart without angina pectoris  Stable, patient on Aspirin. Followed by Cardiology.    9. Mixed hyperlipidemia  Stable, followed by PCP.      Provided Guillaume with a 5-10 year written screening schedule and personal prevention plan. Recommendations were developed using the USPSTF age appropriate recommendations. Education, counseling, and referrals were provided as needed. After Visit Summary printed and given to patient which includes a list of additional screenings\tests needed.    Follow up in about 1 year (around 6/29/2024) for your next annual wellness visit.    Lurdes King, ALLYSSA  I offered to discuss advanced care planning, including how to pick a person who would make decisions for you if you were unable to make them for yourself, called a health care power of , and what kind of decisions you might make such as use of life sustaining treatments such as ventilators and tube feeding when faced with a life limiting illness recorded on a living will that they will need to know. (How you want to be cared for as you near the end of your natural life)     X  Patient is interested in learning more about how to make advanced directives.  I provided them paperwork and offered to discuss this with them.

## 2023-06-30 ENCOUNTER — INFUSION (OUTPATIENT)
Dept: INFUSION THERAPY | Facility: OTHER | Age: 68
End: 2023-06-30
Attending: STUDENT IN AN ORGANIZED HEALTH CARE EDUCATION/TRAINING PROGRAM
Payer: MEDICARE

## 2023-06-30 VITALS
TEMPERATURE: 99 F | SYSTOLIC BLOOD PRESSURE: 111 MMHG | DIASTOLIC BLOOD PRESSURE: 57 MMHG | RESPIRATION RATE: 68 BRPM | OXYGEN SATURATION: 100 % | HEART RATE: 69 BPM

## 2023-06-30 DIAGNOSIS — D64.9 SYMPTOMATIC ANEMIA: ICD-10-CM

## 2023-06-30 PROCEDURE — 36430 TRANSFUSION BLD/BLD COMPNT: CPT

## 2023-06-30 PROCEDURE — 25000003 PHARM REV CODE 250: Performed by: INTERNAL MEDICINE

## 2023-06-30 RX ORDER — HYDROCODONE BITARTRATE AND ACETAMINOPHEN 500; 5 MG/1; MG/1
TABLET ORAL ONCE
Status: DISCONTINUED | OUTPATIENT
Start: 2023-06-30 | End: 2023-06-30 | Stop reason: HOSPADM

## 2023-06-30 RX ORDER — ACETAMINOPHEN 325 MG/1
650 TABLET ORAL
Status: COMPLETED | OUTPATIENT
Start: 2023-06-30 | End: 2023-06-30

## 2023-06-30 RX ORDER — DIPHENHYDRAMINE HCL 25 MG
25 CAPSULE ORAL
Status: COMPLETED | OUTPATIENT
Start: 2023-06-30 | End: 2023-06-30

## 2023-06-30 RX ADMIN — ACETAMINOPHEN 650 MG: 325 TABLET ORAL at 10:06

## 2023-06-30 RX ADMIN — DIPHENHYDRAMINE HYDROCHLORIDE 25 MG: 25 CAPSULE ORAL at 10:06

## 2023-06-30 NOTE — PLAN OF CARE
1 unit PRBC transfusion complete. Pt tolerated well. VSS. NAD. IV DC'd. Pt verbalized understanding of discharge instructions before leaving.

## 2023-07-03 ENCOUNTER — APPOINTMENT (OUTPATIENT)
Dept: LAB | Facility: HOSPITAL | Age: 68
End: 2023-07-03
Payer: MEDICARE

## 2023-07-03 ENCOUNTER — TELEPHONE (OUTPATIENT)
Dept: HEMATOLOGY/ONCOLOGY | Facility: CLINIC | Age: 68
End: 2023-07-03

## 2023-07-03 ENCOUNTER — PROCEDURE VISIT (OUTPATIENT)
Dept: HEMATOLOGY/ONCOLOGY | Facility: CLINIC | Age: 68
End: 2023-07-03
Payer: MEDICARE

## 2023-07-03 VITALS
DIASTOLIC BLOOD PRESSURE: 73 MMHG | HEART RATE: 100 BPM | SYSTOLIC BLOOD PRESSURE: 134 MMHG | OXYGEN SATURATION: 100 % | RESPIRATION RATE: 16 BRPM

## 2023-07-03 DIAGNOSIS — D46.9 MYELODYSPLASTIC SYNDROME: ICD-10-CM

## 2023-07-03 PROCEDURE — 99499 RISK ADDL DX/OHS AUDIT: ICD-10-PCS | Mod: S$GLB,,, | Performed by: NURSE PRACTITIONER

## 2023-07-03 PROCEDURE — 88341 IMHCHEM/IMCYTCHM EA ADD ANTB: CPT | Mod: 26,,, | Performed by: PATHOLOGY

## 2023-07-03 PROCEDURE — 88342 IMHCHEM/IMCYTCHM 1ST ANTB: CPT | Mod: 59 | Performed by: PATHOLOGY

## 2023-07-03 PROCEDURE — 88342 CHG IMMUNOCYTOCHEMISTRY: ICD-10-PCS | Mod: 26,59,, | Performed by: PATHOLOGY

## 2023-07-03 PROCEDURE — 88185 FLOWCYTOMETRY/TC ADD-ON: CPT | Mod: 59 | Performed by: PATHOLOGY

## 2023-07-03 PROCEDURE — 88341 PR IHC OR ICC EACH ADD'L SINGLE ANTIBODY  STAINPR: ICD-10-PCS | Mod: 26,,, | Performed by: PATHOLOGY

## 2023-07-03 PROCEDURE — 88342 IMHCHEM/IMCYTCHM 1ST ANTB: CPT | Mod: 26,59,, | Performed by: PATHOLOGY

## 2023-07-03 PROCEDURE — 88365 INSITU HYBRIDIZATION (FISH): CPT | Mod: 26,,, | Performed by: PATHOLOGY

## 2023-07-03 PROCEDURE — 85097 PR  BONE MARROW,SMEAR INTERPRETATION: ICD-10-PCS | Mod: ,,, | Performed by: PATHOLOGY

## 2023-07-03 PROCEDURE — 88311 DECALCIFY TISSUE: CPT | Performed by: PATHOLOGY

## 2023-07-03 PROCEDURE — 88313 PR  SPECIAL STAINS,GROUP II: ICD-10-PCS | Mod: 26,,, | Performed by: PATHOLOGY

## 2023-07-03 PROCEDURE — 88299 UNLISTED CYTOGENETIC STUDY: CPT | Performed by: INTERNAL MEDICINE

## 2023-07-03 PROCEDURE — 88341 IMHCHEM/IMCYTCHM EA ADD ANTB: CPT | Performed by: PATHOLOGY

## 2023-07-03 PROCEDURE — 88311 DECALCIFY TISSUE: CPT | Mod: 26,,, | Performed by: PATHOLOGY

## 2023-07-03 PROCEDURE — 38222 BONE MARROW: ICD-10-PCS | Mod: LT,S$GLB,, | Performed by: NURSE PRACTITIONER

## 2023-07-03 PROCEDURE — 88305 TISSUE EXAM BY PATHOLOGIST: CPT | Mod: 26,,, | Performed by: PATHOLOGY

## 2023-07-03 PROCEDURE — 88305 TISSUE EXAM BY PATHOLOGIST: CPT | Mod: 59 | Performed by: PATHOLOGY

## 2023-07-03 PROCEDURE — 88189 FLOWCYTOMETRY/READ 16 & >: CPT | Mod: ,,, | Performed by: PATHOLOGY

## 2023-07-03 PROCEDURE — 88365 INSITU HYBRIDIZATION (FISH): CPT | Mod: 59 | Performed by: PATHOLOGY

## 2023-07-03 PROCEDURE — 88311 PR  DECALCIFY TISSUE: ICD-10-PCS | Mod: 26,,, | Performed by: PATHOLOGY

## 2023-07-03 PROCEDURE — 88313 SPECIAL STAINS GROUP 2: CPT | Performed by: PATHOLOGY

## 2023-07-03 PROCEDURE — 38222 DX BONE MARROW BX & ASPIR: CPT | Mod: LT,S$GLB,, | Performed by: NURSE PRACTITIONER

## 2023-07-03 PROCEDURE — 88237 TISSUE CULTURE BONE MARROW: CPT | Performed by: INTERNAL MEDICINE

## 2023-07-03 PROCEDURE — 88189 PR  FLOWCYTOMETRY/READ, 16 & > MARKERS: ICD-10-PCS | Mod: ,,, | Performed by: PATHOLOGY

## 2023-07-03 PROCEDURE — 81450 HL NEO GSAP 5-50DNA/DNA&RNA: CPT | Performed by: INTERNAL MEDICINE

## 2023-07-03 PROCEDURE — 88305 TISSUE EXAM BY PATHOLOGIST: ICD-10-PCS | Mod: 26,,, | Performed by: PATHOLOGY

## 2023-07-03 PROCEDURE — 88184 FLOWCYTOMETRY/ TC 1 MARKER: CPT | Performed by: PATHOLOGY

## 2023-07-03 PROCEDURE — 88365 PR  TISSUE HYBRIDIZATION: ICD-10-PCS | Mod: 26,,, | Performed by: PATHOLOGY

## 2023-07-03 PROCEDURE — 88313 SPECIAL STAINS GROUP 2: CPT | Mod: 26,,, | Performed by: PATHOLOGY

## 2023-07-03 PROCEDURE — 99499 UNLISTED E&M SERVICE: CPT | Mod: S$GLB,,, | Performed by: NURSE PRACTITIONER

## 2023-07-03 PROCEDURE — 88364 INSITU HYBRIDIZATION (FISH): CPT | Mod: 59 | Performed by: PATHOLOGY

## 2023-07-03 PROCEDURE — 85097 BONE MARROW INTERPRETATION: CPT | Mod: ,,, | Performed by: PATHOLOGY

## 2023-07-03 RX ORDER — LIDOCAINE HYDROCHLORIDE 20 MG/ML
20 INJECTION, SOLUTION EPIDURAL; INFILTRATION; INTRACAUDAL; PERINEURAL ONCE
Status: COMPLETED | OUTPATIENT
Start: 2023-07-03 | End: 2023-07-03

## 2023-07-03 RX ADMIN — LIDOCAINE HYDROCHLORIDE 7 ML: 20 INJECTION, SOLUTION EPIDURAL; INFILTRATION; INTRACAUDAL; PERINEURAL at 04:07

## 2023-07-03 NOTE — PROCEDURES
Bone marrow    Date/Time: 7/3/2023 2:30 PM  Performed by: Estefany Cartagena NP  Authorized by: Estefany Cartagena NP     Aspiration?: Yes   Biopsy?: Yes    PROCEDURE NOTE:  Date of Procedure: 07/03/2023   Bone Marrow Biopsy and Aspiration  Indication: MDS -restaging   Consent: Informed consent was obtained from patient.  Timeout: Done and documented.  Position: Prone  Site: Left posterior illiac crest.  Prep: Betadine.  Needle used: 11 gauge Jamshidi needle.  Anesthetic: 2% lidocaine 5 cc.  Biopsy: The biopsy needle was introduced into the marrow cavity and an aspirate was obtained without complications and sent for flow cytometry,AML FISH, NGS,DNA HOLD and cytogenetics. Core biopsy obtained without difficulty and sent for routine histologic examination.  Complications: None.  Disposition: Left patient with primary nurse,instructed to lay on back for 20 minutes. Advised not to take shower and keep dressing clean, dry & intact for 24 hours.  Blood loss: Minimal.     Estefany Cartagena NP  Hematology/Oncology/BMT

## 2023-07-03 NOTE — PROCEDURES
Patient with MDS presented at BMT clinic for restaging bone marrow biopsy. Tolerated procedure well. Not in any distress. See 's notefor full history. Reviewed medications, allergies and labs.     Estefany Cartagena NP  Hematology/Oncology/BMT

## 2023-07-05 ENCOUNTER — HOSPITAL ENCOUNTER (EMERGENCY)
Facility: HOSPITAL | Age: 68
Discharge: HOME | End: 2023-07-05
Attending: EMERGENCY MEDICINE
Payer: MEDICARE

## 2023-07-05 VITALS
OXYGEN SATURATION: 99 % | HEART RATE: 77 BPM | WEIGHT: 170 LBS | BODY MASS INDEX: 25.1 KG/M2 | SYSTOLIC BLOOD PRESSURE: 122 MMHG | DIASTOLIC BLOOD PRESSURE: 71 MMHG | RESPIRATION RATE: 15 BRPM | TEMPERATURE: 98 F

## 2023-07-05 DIAGNOSIS — D69.6 THROMBOCYTOPENIA: ICD-10-CM

## 2023-07-05 DIAGNOSIS — D46.9 MYELODYSPLASIA (MYELODYSPLASTIC SYNDROME): ICD-10-CM

## 2023-07-05 DIAGNOSIS — D64.9 SYMPTOMATIC ANEMIA: Primary | ICD-10-CM

## 2023-07-05 LAB
ABO + RH BLD: NORMAL
ALBUMIN SERPL BCP-MCNC: 3.6 G/DL (ref 3.5–5.2)
ALP SERPL-CCNC: 87 U/L (ref 55–135)
ALT SERPL W/O P-5'-P-CCNC: 25 U/L (ref 10–44)
ANION GAP SERPL CALC-SCNC: 7 MMOL/L (ref 8–16)
ANISOCYTOSIS BLD QL SMEAR: SLIGHT
AST SERPL-CCNC: 20 U/L (ref 10–40)
BASOPHILS # BLD AUTO: 0.01 K/UL (ref 0–0.2)
BASOPHILS NFR BLD: 0.6 % (ref 0–1.9)
BILIRUB SERPL-MCNC: 0.4 MG/DL (ref 0.1–1)
BLD GP AB SCN CELLS X3 SERPL QL: NORMAL
BLD PROD TYP BPU: NORMAL
BLD PROD TYP BPU: NORMAL
BLOOD UNIT EXPIRATION DATE: NORMAL
BLOOD UNIT EXPIRATION DATE: NORMAL
BLOOD UNIT TYPE CODE: 6200
BLOOD UNIT TYPE CODE: 7300
BLOOD UNIT TYPE: NORMAL
BLOOD UNIT TYPE: NORMAL
BUN SERPL-MCNC: 14 MG/DL (ref 8–23)
CALCIUM SERPL-MCNC: 8.9 MG/DL (ref 8.7–10.5)
CHLORIDE SERPL-SCNC: 106 MMOL/L (ref 95–110)
CO2 SERPL-SCNC: 27 MMOL/L (ref 23–29)
CODING SYSTEM: NORMAL
CODING SYSTEM: NORMAL
CREAT SERPL-MCNC: 1 MG/DL (ref 0.5–1.4)
CROSSMATCH INTERPRETATION: NORMAL
CROSSMATCH INTERPRETATION: NORMAL
DIFFERENTIAL METHOD: ABNORMAL
DISPENSE STATUS: NORMAL
DISPENSE STATUS: NORMAL
EOSINOPHIL # BLD AUTO: 0 K/UL (ref 0–0.5)
EOSINOPHIL NFR BLD: 0 % (ref 0–8)
ERYTHROCYTE [DISTWIDTH] IN BLOOD BY AUTOMATED COUNT: 14.2 % (ref 11.5–14.5)
EST. GFR  (NO RACE VARIABLE): >60 ML/MIN/1.73 M^2
GLUCOSE SERPL-MCNC: 85 MG/DL (ref 70–110)
HCT VFR BLD AUTO: 19.4 % (ref 40–54)
HGB BLD-MCNC: 6.2 G/DL (ref 14–18)
HYPOCHROMIA BLD QL SMEAR: ABNORMAL
IMM GRANULOCYTES # BLD AUTO: 0.08 K/UL (ref 0–0.04)
IMM GRANULOCYTES NFR BLD AUTO: 4.5 % (ref 0–0.5)
LYMPHOCYTES # BLD AUTO: 1.4 K/UL (ref 1–4.8)
LYMPHOCYTES NFR BLD: 78.8 % (ref 18–48)
MCH RBC QN AUTO: 27.6 PG (ref 27–31)
MCHC RBC AUTO-ENTMCNC: 32 G/DL (ref 32–36)
MCV RBC AUTO: 86 FL (ref 82–98)
MONOCYTES # BLD AUTO: 0 K/UL (ref 0.3–1)
MONOCYTES NFR BLD: 1.7 % (ref 4–15)
NEUTROPHILS # BLD AUTO: 0.3 K/UL (ref 1.8–7.7)
NEUTROPHILS NFR BLD: 14.4 % (ref 38–73)
NRBC BLD-RTO: 0 /100 WBC
NUM UNITS TRANS PACKED RBC: NORMAL
OVALOCYTES BLD QL SMEAR: ABNORMAL
PLATELET # BLD AUTO: 3 K/UL (ref 150–450)
PLATELET BLD QL SMEAR: ABNORMAL
PMV BLD AUTO: 11.1 FL (ref 9.2–12.9)
POIKILOCYTOSIS BLD QL SMEAR: SLIGHT
POTASSIUM SERPL-SCNC: 4.4 MMOL/L (ref 3.5–5.1)
PROT SERPL-MCNC: 7 G/DL (ref 6–8.4)
RBC # BLD AUTO: 2.25 M/UL (ref 4.6–6.2)
SODIUM SERPL-SCNC: 140 MMOL/L (ref 136–145)
SPECIMEN OUTDATE: NORMAL
UNIT NUMBER: NORMAL
WBC # BLD AUTO: 1.79 K/UL (ref 3.9–12.7)

## 2023-07-05 PROCEDURE — 80053 COMPREHEN METABOLIC PANEL: CPT

## 2023-07-05 PROCEDURE — P9038 RBC IRRADIATED: HCPCS

## 2023-07-05 PROCEDURE — 36430 TRANSFUSION BLD/BLD COMPNT: CPT

## 2023-07-05 PROCEDURE — 63600175 PHARM REV CODE 636 W HCPCS

## 2023-07-05 PROCEDURE — 99285 EMERGENCY DEPT VISIT HI MDM: CPT | Mod: 25

## 2023-07-05 PROCEDURE — 85025 COMPLETE CBC W/AUTO DIFF WBC: CPT

## 2023-07-05 PROCEDURE — 96374 THER/PROPH/DIAG INJ IV PUSH: CPT

## 2023-07-05 PROCEDURE — 25000003 PHARM REV CODE 250

## 2023-07-05 PROCEDURE — 86920 COMPATIBILITY TEST SPIN: CPT

## 2023-07-05 PROCEDURE — P9037 PLATE PHERES LEUKOREDU IRRAD: HCPCS

## 2023-07-05 PROCEDURE — 96361 HYDRATE IV INFUSION ADD-ON: CPT

## 2023-07-05 PROCEDURE — 27201040 HC RC 50 FILTER

## 2023-07-05 PROCEDURE — 86900 BLOOD TYPING SEROLOGIC ABO: CPT

## 2023-07-05 RX ORDER — HYDROCODONE BITARTRATE AND ACETAMINOPHEN 500; 5 MG/1; MG/1
TABLET ORAL
Status: DISCONTINUED | OUTPATIENT
Start: 2023-07-05 | End: 2023-07-05 | Stop reason: HOSPADM

## 2023-07-05 RX ORDER — ONDANSETRON 2 MG/ML
4 INJECTION INTRAMUSCULAR; INTRAVENOUS
Status: COMPLETED | OUTPATIENT
Start: 2023-07-05 | End: 2023-07-05

## 2023-07-05 RX ADMIN — SODIUM CHLORIDE 1000 ML: 9 INJECTION, SOLUTION INTRAVENOUS at 03:07

## 2023-07-05 RX ADMIN — ONDANSETRON 4 MG: 2 INJECTION INTRAMUSCULAR; INTRAVENOUS at 02:07

## 2023-07-05 NOTE — PROGRESS NOTES
PATIENT: Guillaume Salinas  MRN: [id number]  DATE: 7/7/2023    Diagnosis:   1. Myelodysplastic syndrome    2. 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 Symptomatic anemia    5. Chemotherapy-induced nausea and vomiting      Chief Complaint: myelodysplastic syndrome    Oncologic History:      Oncologic History Myelodysplastic syndrome      Oncologic Treatment Azacitidine/venetoclax (start date 6/12/23).      Pathology 7/3/23:  BONE MARROW ASPIRATE, TOUCH PREP, CLOT, AND DECALCIFIED NEEDLE CORE BIOPSY:   LEFT POSTEROSUPERIOR ILIAC CREST   - MORPHOLOGIC AND IMMUNOPHENOTYPIC FEATURES OF PERSISTENT MDS   - LIMITED, SUBOPTIMAL SPECIMEN: Findings may not be entirely representative   - Hypocellular marrow (20% total cellularity) with no increased CD34+ blasts, chemotherapeutic changes, and scant residual trilineage hypoplasia and dyspoiesis with increased numbers of micromegakaryocytes   - Relative lymphocytosis including small, well-defined lymphoid aggregates (favor benign/reactive)   - Relative polytypic plasmacytosis (5-10%) with morphologically unremarkable plasma cells   - Mildly increased stainable histiocytic iron stores (4+ out of 6+)     5/23/23:  LEFT ILIAC CREST BONE MARROW ASPIRATE, BONE MARROW CLOT, AND BONE MARROW CORE BIOPSY WITH:     CELLULARITY= 90-95%, MYELOID PREDOMINANCE (M/E= 7:1).   CONSISTENT WITH MYELODYSPLASTIC SYNDROME WITH EXCESS BLASTS-2 (16-17%).  SEE COMMENT.   ADEQUATE STORAGE IRON.   INCREASED NUMBER OF MEGAKARYOCYTES WITH DYSPLASTIC MORPHOLOGY.       4/26/23:  Bone marrow, left iliac crest, aspirate, clot, and core biopsy:   --Hypercellular marrow for age, 80-90% with trilineage maturation showing increased blasts and small megakaryocytic forms, most compatible with a primary marrow neoplasm, favor myelodysplasia with excess blasts (MDS-EB-2) with impending evolution, final   classification pending correlation with cytogenetic and molecular  "studies, see comment   --Stainable iron is not increased   --Mild reticulin fibrosis          Bone marrow, left iliac crest, aspirate, clot, and core biopsy:   --Hypercellular marrow for age, 80-90% with trilineage maturation showing increased blasts and small megakaryocytic forms, most compatible with a primary marrow neoplasm, favor myelodysplasia with excess blasts (MDS-EB-2) with impending evolution, final   classification pending correlation with cytogenetic and molecular studies, see comment   --Stainable iron is not increased   --Mild reticulin fibrosis     Comment:  Concomitantly submitted flow cytometric analysis detects a mildly increased myeloblast population, concerning for a primary marrow process.  The blast gate is increased with approximately 8% CD38/CD34 positive blasts identified.  Populations of    B lymphocytes are polyclonal and T lymphocytes are immunophenotypically unremarkable.     This study is somewhat limited by lack of cellularity on aspirate smears with mild reticulin fibrosis noted.  However, there are increased CD34 positive blasts, counted at 12% on the immunohistochemical stain with increased megakaryocytes showing many micromegakaryocytic forms.  The morphology in conjunction with peripheral cytopenias is compatible with a primary marrow neoplasm, highly suggestive of myelodysplasia with excess blasts (MDS-EB-2) although an evolving acute leukemia is of concern.     Correlation with clinical findings and any available cytogenetic and molecular studies is required for further characterization.              Subjective:    History of Present Illness: Mr. Betsy Salinas is a 67 y.o. male who presented in April 2023 for evaluation and management of anemia.    [I do not see evidence of anemia in his labs, but he was referred for anemia workup.]    - he went to [place] for a dental procedure. He had several teeth removed ("an intense procedure per his wife"). He had taken " "antibiotics/amoxicillin and ibuprofen. He had the second part of procedure about a week later. He noted severe fatigue, weakness.  - he underwent bone marrow biopsy on 4/26/23.  - he met with Dr. Hickey on 5/8/23. He recommended repeat bone marrow biopsy.  - he underwent bone marrow biopsy on 5/23/23.  - he met with Dr. Hickey on 5/30/23. 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 CG/FISH/NGS pending. I reviewed with him and his family the aggressive nature of this disease, including natural history, prognosis, and treatment options. I recommended treatment with azacitidine 75 mg/m2 daily x7 days plus venetoclax 100mg (posa) daily x21 of 28 days. He was in agreement. Consent signed today.   Azacitidine plus venetoclax x21 of 28 days as above. Taz ramp up ordered.  Repeat bone marrow biopsy at the end of cycle 1. Orders placed.     Stem cell transplant candidate: We briefly discussed allogeneic stem cell transplantation in MDS. He will need transplant in CR1. Will plan for further discussion of stem cell transplant and meeting with transplant coordinators at next follow up in 2-3 weeks.   Will send HLA typing with next labs."      Interval history:  - he presents for a follow-up appointment for his myelodysplastic syndrome.  - he met with Dr. Hickey on 6/20/23. Information per note:  "HCT-CI of 1 (intermediate risk). Will plan to proceed with transplant in the next 2-3 months (as soon as a donor is identified). HLA typing sent."  - he underwent bone marrow biopsy on 7/3/23  - today, he endorses fatigue, dyspnea upon exertion. He denies chest pain, nausea, vomiting, diarrhea, constipation.  - he is scheduled to start cycle #2 of azacitidine/venetoclax on 7/10/23. He is scheduled to see Dr. Hickey on 7/11/23.        Past medical, surgical, family, and social histories have been reviewed and updated below.    Past Medical History:   Past Medical History:   Diagnosis " Date    Anticoagulant long-term use     Coronary artery disease     Hypertension     Peripheral vascular disease, unspecified        Past Surgical History:   Past Surgical History:   Procedure Laterality Date    BONE MARROW BIOPSY Left 4/26/2023    Procedure: Biopsy-bone marrow;  Surgeon: Harry Diamond MD;  Location: [facility] OR;  Service: Oncology;  Laterality: Left;    COLONOSCOPY N/A 01/05/2022    Procedure: COLONOSCOPY Golytely Vaccinated will bring cards;  Surgeon: Dereje Simon MD;  Location: [facility] ENDO;  Service: Endoscopy;  Laterality: N/A;  Do not cancel this order       Family History:   Family History   Problem Relation Age of Onset    Hypertension Mother     Cancer Father     Cancer Brother     No Known Problems Daughter     No Known Problems Daughter     No Known Problems Son        Social History:  reports that he quit smoking about 4 months ago. His smoking use included cigarettes. He has a 12.50 pack-year smoking history. He has never used smokeless tobacco. He reports that he does not currently use alcohol. He reports that he does not use drugs.    Allergies:  Review of patient's allergies indicates:  No Known Allergies    Medications:  Current Outpatient Medications   Medication Sig Dispense Refill    acyclovir (ZOVIRAX) 400 MG tablet Take 1 tablet (400 mg total) by mouth 2 (two) times daily. 60 tablet 11    allopurinoL (ZYLOPRIM) 300 MG tablet Take 1 tablet (300 mg total) by mouth 2 (two) times daily. 60 tablet 11    aspirin (ECOTRIN) 81 MG EC tablet Take 1 tablet (81 mg total) by mouth once daily. 30 tablet 12    atorvastatin (LIPITOR) 80 MG tablet Take 1 tablet (80 mg total) by mouth once daily. (Patient not taking: Reported on 6/29/2023) 90 tablet 3    carvediloL (COREG) 6.25 MG tablet TAKE 1 TABLET(6.25 MG) BY MOUTH TWICE DAILY WITH MEALS 180 tablet 3    cilostazoL (PLETAL) 50 MG Tab Take 1 tablet (50 mg total) by mouth 2 (two) times daily. 180 tablet 3    docusate sodium (COLACE) 100  MG capsule Take 100 mg by mouth 2 (two) times daily as needed for Constipation.      gabapentin (NEURONTIN) 300 MG capsule Take 1 capsule (300 mg total) by mouth 3 (three) times daily. 90 capsule 11    levoFLOXacin (LEVAQUIN) 500 MG tablet Take 1 tablet (500 mg total) by mouth once daily. 30 tablet 11    posaconazole (NOXAFIL) 100 mg TbEC tablet Take 3 tablets (300 mg total) by mouth once daily. (Patient not taking: Reported on 6/29/2023) 90 tablet 11    promethazine (PHENERGAN) 25 MG tablet Take 1 tablet (25 mg total) by mouth every 8 (eight) hours as needed for Nausea. 40 tablet 5    venetoclax (VENCLEXTA) 100 mg Tab Take 1 tablet (100 mg) by mouth once daily on days 3-14 of a 28-day cycle (FOR CYCLE 1 ONLY). Do not discard any remaining tablets. 28 tablet 0    venetoclax (VENCLEXTA) 100 mg Tab Take 1 tablet (100 mg) by mouth once daily on days 1-14 of a 28-day cycle. Do not discard any remaining tablets. 28 tablet 11    voriconazole (VFEND) 200 MG Tab Take 1 tablet (200 mg total) by mouth 2 (two) times daily. 60 tablet 0    zolpidem (AMBIEN) 10 mg Tab Take 10 mg by mouth nightly as needed.       No current facility-administered medications for this visit.       Review of Systems   Constitutional:  Positive for fatigue.   HENT:  Negative for sore throat.    Eyes:  Negative for visual disturbance.   Respiratory:  Positive for shortness of breath.    Cardiovascular:  Negative for chest pain.   Gastrointestinal:  Negative for abdominal pain.   Genitourinary:  Negative for dysuria.   Musculoskeletal:  Negative for back pain.   Skin:  Negative for rash.   Neurological:  Positive for weakness. Negative for headaches.   Hematological:  Negative for adenopathy.   Psychiatric/Behavioral:  The patient is not nervous/anxious.      ECOG Performance Status:   ECOG SCORE    1 - Restricted in strenuous activity-ambulatory and able to carry out work of a light nature         Objective:      Vitals:   Vitals:    07/07/23 1031    BP: (!) 115/57   BP Location: Right arm   Patient Position: Sitting   BP Method: Medium (Automatic)   Pulse: 89   Resp: 18   SpO2: 97%   Weight: 72.4 kg (159 lb 9.8 oz)     BMI: Body mass index is 23.57 kg/m².    Physical Exam  Vitals and nursing note reviewed.   Constitutional:       Appearance: He is well-developed.   HENT:      Head: Normocephalic and atraumatic.   Eyes:      Pupils: Pupils are equal, round, and reactive to light.   Cardiovascular:      Rate and Rhythm: Normal rate and regular rhythm.   Pulmonary:      Effort: Pulmonary effort is normal.      Breath sounds: Normal breath sounds.   Abdominal:      General: Bowel sounds are normal.      Palpations: Abdomen is soft.   Musculoskeletal:         General: Normal range of motion.      Cervical back: Normal range of motion and neck supple.   Skin:     General: Skin is warm and dry.   Neurological:      Mental Status: He is alert and oriented to person, place, and time.   Psychiatric:         Behavior: Behavior normal.         Thought Content: Thought content normal.         Judgment: Judgment normal.       Laboratory Data:  Labs have been reviewed.    Lab Results   Component Value Date    WBC 1.79 (LL) 07/05/2023    HGB 6.2 (L) 07/05/2023    HCT 19.4 (LL) 07/05/2023    MCV 86 07/05/2023    PLT 3 (LL) 07/05/2023       Imaging:        Assessment:       1. Myelodysplastic syndrome    2. 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 Symptomatic anemia    5. Chemotherapy-induced nausea and vomiting           Plan:     Myelodysplastic syndrome  - bone marrow biopsy (4/26/23) revealed myelodysplastic syndrome.  - he met with Dr. Hickey on 5/8/23. He recommended repeat bone marrow biopsy.  - bone marrow biopsy (5/23/23) revealed myelodysplastic syndrome with excess blasts.  - he met with Dr. Hickey on 5/30/23. He recommended azacitidine (7-day) with venetoclax.  - he has received several blood transfusions in June  "2023  - he met with Dr. Hickey on 6/20/23. Information per note:  "HCT-CI of 1 (intermediate risk). Will plan to proceed with transplant in the next 2-3 months (as soon as a donor is identified). HLA typing sent."  - bone marrow biopsy (7/3/23) revealed no increased blasts! He will follow up with Dr. Hickey on 7/11/23  - he has received blood/platelets on 7/5/23.  - will increase frequency of labs to twice daily.  - okay to proceed with cycle #2 of azacitidine, scheduled for week of 7/10/23. Hold venetoclax until he sees Dr. Hickey on 7/11/23.  - okay to proceed with cycle #1 of azacitidine/venetoclax, scheduled to start on 6/12/23.  - increase frequency of labs to twice weekly.  - continue anti-infectious therapy per Dr. Hickey's plan.  - return to clinic in one month.    2. Chemotherapy-induced nausea/vomiting  - mild symptoms. Continue phenergan as needed.    3. Advance Care Planning     Power of   After our discussion (at previous visit), the patient has decided not to complete a HCPOA.       - return to clinic in one month.    Harry Diamond M.D.  Hematology/Oncology  [facility] - [address], [address]  [place] [address]  Phone: [phone]  Fax: [phone]  "

## 2023-07-05 NOTE — ED NOTES
C/C: 67 y.o. male arrived to the ED via POV for c/o abnormal labs. Pt w/ hx of leukemia reports low hgb of 6.3 and hct of 19 at recent oncology visit. Pt states last chemo tx was last week. Denies fever, chills, chest pain, N/V/D.    APPEARANCE: awake, alert, and appears to be in NAD. Pain score 0/10. Pt placed on cont cardiac monitoring, auto BP cuff, and cont pulse ox. Bed in lowest and locked position w/ side rails upx2. Call light w/n pt reach and instructed on how to use.  SKIN: warm, dry and intact. No visible breakdown or bruising noted to extremities.   MUSCULOSKELETAL: Pt moving all extremities spontaneously, no obvious swelling or deformities noted. Ambulates independently.  RESPIRATORY: Airway open and patent. +SOB. Respirations even, unlabored, equal bilaterally on inspiration and expiration.   CARDIAC: Regular HR. Denies CP, 2+ distal pulses; no peripheral edema  ABDOMEN: S/ND/NT, Denies N/V/D. Pt denies abnormal BM or changes in appetite.   : Pt voids spontaneously, denies dysuria, hematuria, urinary frequency, or incontinence.   NEUROLOGIC: AAO x 4; speaking and following commands appropriately. Equal strength in all extremities; denies numbness/tingling. Denies dizziness, lightheadedness, visual changes, or HA. +bifocal use

## 2023-07-05 NOTE — PROVIDER PROGRESS NOTES - EMERGENCY DEPT.
Encounter Date: 7/5/2023    ED Physician Progress Notes        Physician Note:   I received this patient in sign out from the previous team.  I have discussed the patient's history and presentation in the ED with Dr. Cornelius in the care of the PA  The patient is a 67 y.o. male who presented to the ED with Abnormal Lab (Low h&h, 6.3/19.1 )    Significant history and PE findings: known to heme/onc with pancytopenia, no active bleeding  Significant diagnostic results: hgb 6.2, plt3  Pending studies and/or consultations: heme/onc recommending transfusion and dc for f/u Friday  Disposition:  Will continue to monitor, update patient on results of testing and determine appropriate additional treatment for the duration of stay in ED.  Pertinent lab and imaging findings are below.  Mathieu Kulkarni DO 5:13 PM 7/5/2023    Heme/onc stated pt could receive plt and prbc in ED, they will follow up Friday, no need for admission given chronicity and no active bleeding

## 2023-07-05 NOTE — ED PROVIDER NOTES
Encounter Date: 2023       History     Chief Complaint   Patient presents with    Abnormal Lab     Low h&h, 6.3/19.1      67 y.o.male with PVD, CAD, HTN who presents to the ED for blood transfusion.  Had labs drawn on Monday and referred here due to a hemoglobin of 6.3.  Currently complaining of bilateral arm weakness and mild nausea.  Denies any pain.  Patient was diagnosed with myelodysplastic syndrome in April. Has had multiple blood transfusions since then. Started chemo in  with last infusion 23.  Next infusion is next week.  Denies chest pain, shortness of breath, hematochezia, or any other complaints at this time.    The history is provided by the patient, medical records, a friend and the spouse. No  was used (Denied, wife interpreted).   Review of patient's allergies indicates:  No Known Allergies  Past Medical History:   Diagnosis Date    Anticoagulant long-term use     Coronary artery disease     Hypertension     Peripheral vascular disease, unspecified      Past Surgical History:   Procedure Laterality Date    BONE MARROW BIOPSY Left 2023    Procedure: Biopsy-bone marrow;  Surgeon: Harry Diamond MD;  Location: [facility] OR;  Service: Oncology;  Laterality: Left;    COLONOSCOPY N/A 2022    Procedure: COLONOSCOPY Golytely Vaccinated will bring cards;  Surgeon: Dereje Simon MD;  Location: [facility] ENDO;  Service: Endoscopy;  Laterality: N/A;  Do not cancel this order     Family History   Problem Relation Age of Onset    Hypertension Mother     Cancer Father     Cancer Brother     No Known Problems Daughter     No Known Problems Daughter     No Known Problems Son      Social History     Tobacco Use    Smoking status: Former     Packs/day: 0.25     Years: 50.00     Pack years: 12.50     Types: Cigarettes     Quit date: 3/1/2023     Years since quittin.3    Smokeless tobacco: Never   Substance Use Topics    Alcohol use: Not Currently    Drug use: Never      Review of Systems   Constitutional:  Negative for fever.   HENT:  Negative for sore throat.    Respiratory:  Negative for shortness of breath.    Cardiovascular:  Negative for chest pain.   Gastrointestinal:  Positive for nausea.   Genitourinary:  Negative for dysuria.   Musculoskeletal:  Negative for back pain.   Skin:  Negative for rash.   Neurological:  Positive for weakness.   Hematological:  Does not bruise/bleed easily.     Physical Exam     Initial Vitals [07/05/23 1246]   BP Pulse Resp Temp SpO2   (!) 152/83 100 18 98.3 °F (36.8 °C) 97 %      MAP       --         Physical Exam    Vitals reviewed.  Constitutional: He appears well-developed and well-nourished. He is not diaphoretic. No distress.   Comfortably sitting   HENT:   Head: Normocephalic and atraumatic.   Neck: Neck supple.   Cardiovascular:  Normal rate, regular rhythm and normal heart sounds.     Exam reveals no gallop and no friction rub.       No murmur heard.  Pulmonary/Chest: Breath sounds normal. He has no wheezes. He has no rhonchi. He has no rales.   Abdominal: Abdomen is soft. There is no abdominal tenderness.   Musculoskeletal:         General: Normal range of motion.      Cervical back: Neck supple.     Neurological: He is alert and oriented to person, place, and time.   Psychiatric: He has a normal mood and affect.       ED Course   Procedures  Labs Reviewed   CBC W/ AUTO DIFFERENTIAL - Abnormal; Notable for the following components:       Result Value    WBC 1.79 (*)     RBC 2.25 (*)     Hemoglobin 6.2 (*)     Hematocrit 19.4 (*)     Platelets 3 (*)     Immature Granulocytes 4.5 (*)     Gran # (ANC) 0.3 (*)     Immature Grans (Abs) 0.08 (*)     Mono # 0.0 (*)     Gran % 14.4 (*)     Lymph % 78.8 (*)     Mono % 1.7 (*)     Platelet Estimate Decreased (*)     All other components within normal limits    Narrative:     PLT acknowledged and accepted results on test(s) via secure chat.   SCOTT SWIFT RN by JC8 07/05/2023 14:36  WBC  AND HCT _ acknowledged and accepted results on test(s) _ via   secure chat. SCOTT SWIFT,NURSE by NEIL 07/05/2023 14:02   COMPREHENSIVE METABOLIC PANEL - Abnormal; Notable for the following components:    Anion Gap 7 (*)     All other components within normal limits   TYPE & SCREEN   PREPARE RBC SOFT   PREPARE PLATELETS (DOSE) SOFT          Imaging Results    None          Medications   0.9%  NaCl infusion (for blood administration) (has no administration in time range)   0.9%  NaCl infusion (for blood administration) (has no administration in time range)   ondansetron injection 4 mg (4 mg Intravenous Given 7/5/23 1427)   sodium chloride 0.9% bolus 1,000 mL 1,000 mL (0 mLs Intravenous Stopped 7/5/23 1620)     Medical Decision Making:   History:   Old Medical Records: I decided to obtain old medical records.  Old Records Summarized: records from clinic visits.       <> Summary of Records: · 4/12/23: Initial evaluation by Dr. Diamond of anemia. WBC 2.71, Hgb 7.1, Plts  400. Prior to this visit, he was in [place] for a dental procedure. His symptoms of fatigue started after extractions. Workup by Dr. Diamond unremarkable.   · 4/26/23: Bone marrow biopsy revealed a hypercellular marrow (80-90%) with increased blasts (8-12%) concerning for MDS EB2. However, sample was limited.   · 5/23/23: Repeat bone marrow biopsy revealed myelodysplastic syndrome with excess blasts 2 (blasts 16-17%). 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 NGS with ASXL1 (44%), EZH2 (87%), IDH2 (42%), STAG2 (89%), U2AF1 (3%).  · 6/12/23: Cycle 1 of azacitidine 75 mg/m2 plus venetoclax x14 of 28 days.  Initial Assessment:   Guillaume Salinas (Guillaume) is a pleasant 67 y.o.male with PVD, CAD, HTN who presents to the ED for blood transfusion. WBC 1.98, Hgb 6.3, Hct 19.1, and platelet 2 from 2 days ago. Complaint of lightheadedness and mild nausea. Vitals stable. Well-appearing. Will repeat labs and plan for transfusion.  Differential Diagnosis:    My differential diagnoses include but are not limited to:   Anemia, thrombocytopenia, leukopenia, pancytopenia, chemo-related symptoms  Clinical Tests:   Lab Tests: Ordered and Reviewed  The following lab test(s) were unremarkable: CMP  ED Management:  BMT recommends outpatient follow up on Friday once transfusion is completed. Has appointment scheduled appt Friday for follow up.  Advised to follow up with PCP and strict ED precautions given with all questions answered.  Patient verbalized understanding and agreed to plan.  Vitals are stable and safe for discharge.   I have reviewed the patient's records and discussed with my supervising physician.  Other:   I have discussed this case with another health care provider.       <> Summary of the Discussion: Hematology oncology           ED Course as of 07/05/23 1931 Wed Jul 05, 2023   1502 WBC(!!): 1.79 [KB]   1502 RBC(!): 2.25 [KB]   1502 Hemoglobin(!): 6.2  RBC ordered [KB]   1502 Hematocrit(!!): 19.4 [KB]   1502 Platelets(!!): 3  Platelets ordered [KB]   1520 BMT consulted [KB]   1614 Per BMT, once patient finishes transfusions he can be discharged and follow up on Friday OP [KB]      ED Course User Index  [KB] Beverley Gilbert PA-C                 Clinical Impression:   Final diagnoses:  [D64.9] Symptomatic anemia (Primary)  [D69.6] Thrombocytopenia  [D46.9] Myelodysplasia (myelodysplastic syndrome)        ED Disposition Condition    Discharge Stable          ED Prescriptions    None       Follow-up Information       Follow up With Specialties Details Why Contact Info    Kal Hargrove MD Family Medicine, Sports Medicine Schedule an appointment as soon as possible for a visit  As needed [address]  [place] [address]  [phone]      Mohit Hickey MD Hematology and Oncology Go in 2 days  [address] [facility] [address]  [phone]      [facility] - Emergency Dept Emergency Medicine Go to  If symptoms worsen [address]  [facility] [address]  [phone]             Beverley Gilbert PA-C  07/06/23 0026       Beverley Gilbert PA-C  07/06/23 0027

## 2023-07-06 DIAGNOSIS — D49.9 IMMUNODEFICIENCY SECONDARY TO NEOPLASM: ICD-10-CM

## 2023-07-06 DIAGNOSIS — D84.81 IMMUNODEFICIENCY SECONDARY TO NEOPLASM: ICD-10-CM

## 2023-07-06 NOTE — ED NOTES
Received report on pt and assumed care.  Patient identifiers for Guillaume Salinas checked and correct.  Pt came into ER for abn labs oh h&h 6.3/19.1.  Pt received 1 unit of blood and plts.  Pt has hx of leukemia.  LOC: The patient is awake, alert and aware of environment with an appropriate affect, the patient is oriented x 4 and speaking appropriately.    APPEARANCE: Patient resting comfortably and in no acute distress, patient is clean and well groomed, patient's clothing is properly fastened.    SKIN: The skin is warm and dry, color consistent with ethnicity, patient has normal skin turgor and moist mucus membranes, skin intact, no breakdown or bruising noted.    MUSCULOSKELETAL: Patient moving all extremities well, no obvious swelling or deformities noted.    RESPIRATORY: Airway is open and patent, respirations are spontaneous and even, patient has a normal effort and rate.    CARDIAC: Patient has a normal rate and rhythm, no periphreal edema noted, capillary refill < 3 seconds.    ABDOMEN: Soft and non tender to palpation, no distention noted. Patient denies any nausea, vomiting, diarrhea, or constipation.     NEUROLOGIC: Eyes open spontaneously, PERRL, behavior appropriate to situation, follows commands, facial expression symmetrical, bilateral hand grasp equal and even, purposeful motor response noted, normal sensation in all extremities.     HEENT: No abnormalities noted. White sclera and pupils equal round and reactive to light. Denies headache, dizziness.     : Pt voids independently, denies dysuria, hematuria, frequency.

## 2023-07-07 ENCOUNTER — OFFICE VISIT (OUTPATIENT)
Dept: HEMATOLOGY/ONCOLOGY | Facility: CLINIC | Age: 68
End: 2023-07-07
Payer: MEDICARE

## 2023-07-07 VITALS
SYSTOLIC BLOOD PRESSURE: 115 MMHG | DIASTOLIC BLOOD PRESSURE: 57 MMHG | OXYGEN SATURATION: 97 % | RESPIRATION RATE: 18 BRPM | WEIGHT: 159.63 LBS | BODY MASS INDEX: 23.57 KG/M2 | HEART RATE: 89 BPM

## 2023-07-07 DIAGNOSIS — D46.9 MYELODYSPLASTIC SYNDROME: Primary | ICD-10-CM

## 2023-07-07 DIAGNOSIS — D69.59 SECONDARY THROMBOCYTOPENIA: ICD-10-CM

## 2023-07-07 DIAGNOSIS — Z79.899 IMMUNODEFICIENCY DUE TO DRUG THERAPY: ICD-10-CM

## 2023-07-07 DIAGNOSIS — T45.1X5A CHEMOTHERAPY-INDUCED THROMBOCYTOPENIA: ICD-10-CM

## 2023-07-07 DIAGNOSIS — D64.9 SYMPTOMATIC ANEMIA: ICD-10-CM

## 2023-07-07 DIAGNOSIS — D49.9 IMMUNODEFICIENCY SECONDARY TO NEOPLASM: ICD-10-CM

## 2023-07-07 DIAGNOSIS — R11.2 CHEMOTHERAPY-INDUCED NAUSEA AND VOMITING: ICD-10-CM

## 2023-07-07 DIAGNOSIS — D69.59 CHEMOTHERAPY-INDUCED THROMBOCYTOPENIA: ICD-10-CM

## 2023-07-07 DIAGNOSIS — D84.821 IMMUNODEFICIENCY DUE TO DRUG THERAPY: ICD-10-CM

## 2023-07-07 DIAGNOSIS — D84.81 IMMUNODEFICIENCY SECONDARY TO NEOPLASM: ICD-10-CM

## 2023-07-07 DIAGNOSIS — T45.1X5A CHEMOTHERAPY-INDUCED NAUSEA AND VOMITING: ICD-10-CM

## 2023-07-07 LAB
DNA/RNA EXTRACT AND HOLD RESULT: NORMAL
DNA/RNA EXTRACTION: NORMAL
EXHR SPECIMEN TYPE: NORMAL

## 2023-07-07 PROCEDURE — 99215 PR OFFICE/OUTPT VISIT, EST, LEVL V, 40-54 MIN: ICD-10-PCS | Mod: S$GLB,,, | Performed by: INTERNAL MEDICINE

## 2023-07-07 PROCEDURE — 1126F PR PAIN SEVERITY QUANTIFIED, NO PAIN PRESENT: ICD-10-PCS | Mod: CPTII,S$GLB,, | Performed by: INTERNAL MEDICINE

## 2023-07-07 PROCEDURE — 3288F PR FALLS RISK ASSESSMENT DOCUMENTED: ICD-10-PCS | Mod: CPTII,S$GLB,, | Performed by: INTERNAL MEDICINE

## 2023-07-07 PROCEDURE — 1159F PR MEDICATION LIST DOCUMENTED IN MEDICAL RECORD: ICD-10-PCS | Mod: CPTII,S$GLB,, | Performed by: INTERNAL MEDICINE

## 2023-07-07 PROCEDURE — 3288F FALL RISK ASSESSMENT DOCD: CPT | Mod: CPTII,S$GLB,, | Performed by: INTERNAL MEDICINE

## 2023-07-07 PROCEDURE — 3008F BODY MASS INDEX DOCD: CPT | Mod: CPTII,S$GLB,, | Performed by: INTERNAL MEDICINE

## 2023-07-07 PROCEDURE — 3074F SYST BP LT 130 MM HG: CPT | Mod: CPTII,S$GLB,, | Performed by: INTERNAL MEDICINE

## 2023-07-07 PROCEDURE — 99215 OFFICE O/P EST HI 40 MIN: CPT | Mod: S$GLB,,, | Performed by: INTERNAL MEDICINE

## 2023-07-07 PROCEDURE — 1159F MED LIST DOCD IN RCRD: CPT | Mod: CPTII,S$GLB,, | Performed by: INTERNAL MEDICINE

## 2023-07-07 PROCEDURE — 99999 PR PBB SHADOW E&M-EST. PATIENT-LVL III: CPT | Mod: PBBFAC,,, | Performed by: INTERNAL MEDICINE

## 2023-07-07 PROCEDURE — 3008F PR BODY MASS INDEX (BMI) DOCUMENTED: ICD-10-PCS | Mod: CPTII,S$GLB,, | Performed by: INTERNAL MEDICINE

## 2023-07-07 PROCEDURE — 1101F PR PT FALLS ASSESS DOC 0-1 FALLS W/OUT INJ PAST YR: ICD-10-PCS | Mod: CPTII,S$GLB,, | Performed by: INTERNAL MEDICINE

## 2023-07-07 PROCEDURE — 3074F PR MOST RECENT SYSTOLIC BLOOD PRESSURE < 130 MM HG: ICD-10-PCS | Mod: CPTII,S$GLB,, | Performed by: INTERNAL MEDICINE

## 2023-07-07 PROCEDURE — 3078F DIAST BP <80 MM HG: CPT | Mod: CPTII,S$GLB,, | Performed by: INTERNAL MEDICINE

## 2023-07-07 PROCEDURE — 1160F RVW MEDS BY RX/DR IN RCRD: CPT | Mod: CPTII,S$GLB,, | Performed by: INTERNAL MEDICINE

## 2023-07-07 PROCEDURE — 1126F AMNT PAIN NOTED NONE PRSNT: CPT | Mod: CPTII,S$GLB,, | Performed by: INTERNAL MEDICINE

## 2023-07-07 PROCEDURE — 1101F PT FALLS ASSESS-DOCD LE1/YR: CPT | Mod: CPTII,S$GLB,, | Performed by: INTERNAL MEDICINE

## 2023-07-07 PROCEDURE — 99999 PR PBB SHADOW E&M-EST. PATIENT-LVL III: ICD-10-PCS | Mod: PBBFAC,,, | Performed by: INTERNAL MEDICINE

## 2023-07-07 PROCEDURE — 1160F PR REVIEW ALL MEDS BY PRESCRIBER/CLIN PHARMACIST DOCUMENTED: ICD-10-PCS | Mod: CPTII,S$GLB,, | Performed by: INTERNAL MEDICINE

## 2023-07-07 PROCEDURE — 3078F PR MOST RECENT DIASTOLIC BLOOD PRESSURE < 80 MM HG: ICD-10-PCS | Mod: CPTII,S$GLB,, | Performed by: INTERNAL MEDICINE

## 2023-07-07 RX ORDER — AZACITIDINE 100 MG/1
75 INJECTION, POWDER, LYOPHILIZED, FOR SOLUTION INTRAVENOUS; SUBCUTANEOUS
Status: CANCELLED | OUTPATIENT
Start: 2023-07-10

## 2023-07-07 RX ORDER — ONDANSETRON HYDROCHLORIDE 8 MG/1
16 TABLET, FILM COATED ORAL
Status: CANCELLED | OUTPATIENT
Start: 2023-07-13

## 2023-07-07 RX ORDER — ONDANSETRON HYDROCHLORIDE 8 MG/1
16 TABLET, FILM COATED ORAL
Status: CANCELLED | OUTPATIENT
Start: 2023-07-12

## 2023-07-07 RX ORDER — AZACITIDINE 100 MG/1
75 INJECTION, POWDER, LYOPHILIZED, FOR SOLUTION INTRAVENOUS; SUBCUTANEOUS
Status: CANCELLED | OUTPATIENT
Start: 2023-07-12

## 2023-07-07 RX ORDER — ONDANSETRON HYDROCHLORIDE 8 MG/1
16 TABLET, FILM COATED ORAL
Status: CANCELLED | OUTPATIENT
Start: 2023-07-18

## 2023-07-07 RX ORDER — AZACITIDINE 100 MG/1
75 INJECTION, POWDER, LYOPHILIZED, FOR SOLUTION INTRAVENOUS; SUBCUTANEOUS
Status: CANCELLED | OUTPATIENT
Start: 2023-07-11

## 2023-07-07 RX ORDER — ONDANSETRON HYDROCHLORIDE 8 MG/1
16 TABLET, FILM COATED ORAL
Status: CANCELLED | OUTPATIENT
Start: 2023-07-17

## 2023-07-07 RX ORDER — ONDANSETRON HYDROCHLORIDE 8 MG/1
16 TABLET, FILM COATED ORAL
Status: CANCELLED | OUTPATIENT
Start: 2023-07-10

## 2023-07-07 RX ORDER — AZACITIDINE 100 MG/1
75 INJECTION, POWDER, LYOPHILIZED, FOR SOLUTION INTRAVENOUS; SUBCUTANEOUS
Status: CANCELLED | OUTPATIENT
Start: 2023-07-14

## 2023-07-07 RX ORDER — AZACITIDINE 100 MG/1
75 INJECTION, POWDER, LYOPHILIZED, FOR SOLUTION INTRAVENOUS; SUBCUTANEOUS
Status: CANCELLED | OUTPATIENT
Start: 2023-07-17

## 2023-07-07 RX ORDER — AZACITIDINE 100 MG/1
75 INJECTION, POWDER, LYOPHILIZED, FOR SOLUTION INTRAVENOUS; SUBCUTANEOUS
Status: CANCELLED | OUTPATIENT
Start: 2023-07-13

## 2023-07-07 RX ORDER — ONDANSETRON HYDROCHLORIDE 8 MG/1
16 TABLET, FILM COATED ORAL
Status: CANCELLED | OUTPATIENT
Start: 2023-07-11

## 2023-07-07 RX ORDER — ONDANSETRON HYDROCHLORIDE 8 MG/1
16 TABLET, FILM COATED ORAL
Status: CANCELLED | OUTPATIENT
Start: 2023-07-14

## 2023-07-07 RX ORDER — AZACITIDINE 100 MG/1
75 INJECTION, POWDER, LYOPHILIZED, FOR SOLUTION INTRAVENOUS; SUBCUTANEOUS
Status: CANCELLED | OUTPATIENT
Start: 2023-07-18

## 2023-07-09 ENCOUNTER — PATIENT MESSAGE (OUTPATIENT)
Dept: HEMATOLOGY/ONCOLOGY | Facility: CLINIC | Age: 68
End: 2023-07-09
Payer: MEDICARE

## 2023-07-10 ENCOUNTER — INFUSION (OUTPATIENT)
Dept: INFUSION THERAPY | Facility: HOSPITAL | Age: 68
End: 2023-07-10
Payer: MEDICARE

## 2023-07-10 ENCOUNTER — DOCUMENTATION ONLY (OUTPATIENT)
Dept: HEMATOLOGY/ONCOLOGY | Facility: CLINIC | Age: 68
End: 2023-07-10
Payer: MEDICARE

## 2023-07-10 VITALS
HEART RATE: 81 BPM | BODY MASS INDEX: 23.64 KG/M2 | SYSTOLIC BLOOD PRESSURE: 111 MMHG | HEIGHT: 69 IN | RESPIRATION RATE: 18 BRPM | DIASTOLIC BLOOD PRESSURE: 63 MMHG | TEMPERATURE: 98 F | WEIGHT: 159.63 LBS | OXYGEN SATURATION: 97 %

## 2023-07-10 DIAGNOSIS — D46.9 MYELODYSPLASTIC SYNDROME: Primary | ICD-10-CM

## 2023-07-10 DIAGNOSIS — D64.9 SYMPTOMATIC ANEMIA: Primary | ICD-10-CM

## 2023-07-10 PROCEDURE — 63600175 PHARM REV CODE 636 W HCPCS: Performed by: INTERNAL MEDICINE

## 2023-07-10 PROCEDURE — 96401 CHEMO ANTI-NEOPL SQ/IM: CPT

## 2023-07-10 PROCEDURE — 25000003 PHARM REV CODE 250: Performed by: INTERNAL MEDICINE

## 2023-07-10 RX ORDER — ACETAMINOPHEN 325 MG/1
650 TABLET ORAL
Status: CANCELLED | OUTPATIENT
Start: 2023-07-10

## 2023-07-10 RX ORDER — HYDROCODONE BITARTRATE AND ACETAMINOPHEN 500; 5 MG/1; MG/1
TABLET ORAL ONCE
Status: CANCELLED | OUTPATIENT
Start: 2023-07-10 | End: 2023-07-10

## 2023-07-10 RX ORDER — ONDANSETRON HYDROCHLORIDE 8 MG/1
16 TABLET, FILM COATED ORAL
Status: COMPLETED | OUTPATIENT
Start: 2023-07-10 | End: 2023-07-10

## 2023-07-10 RX ORDER — LEVOFLOXACIN 500 MG/1
500 TABLET, FILM COATED ORAL DAILY
Qty: 30 TABLET | Refills: 11 | Status: SHIPPED | OUTPATIENT
Start: 2023-07-10 | End: 2024-03-18 | Stop reason: SDUPTHER

## 2023-07-10 RX ORDER — ACYCLOVIR 400 MG/1
400 TABLET ORAL 2 TIMES DAILY
Qty: 60 TABLET | Refills: 11 | Status: SHIPPED | OUTPATIENT
Start: 2023-07-10 | End: 2023-12-19 | Stop reason: SDUPTHER

## 2023-07-10 RX ORDER — AZACITIDINE 100 MG/1
75 INJECTION, POWDER, LYOPHILIZED, FOR SOLUTION INTRAVENOUS; SUBCUTANEOUS
Status: COMPLETED | OUTPATIENT
Start: 2023-07-10 | End: 2023-07-10

## 2023-07-10 RX ORDER — DIPHENHYDRAMINE HCL 25 MG
25 CAPSULE ORAL
Status: CANCELLED | OUTPATIENT
Start: 2023-07-10

## 2023-07-10 RX ORDER — VORICONAZOLE 200 MG/1
200 TABLET, FILM COATED ORAL 2 TIMES DAILY
Qty: 60 TABLET | Refills: 11 | Status: SHIPPED | OUTPATIENT
Start: 2023-07-10 | End: 2023-12-11 | Stop reason: SDUPTHER

## 2023-07-10 RX ADMIN — AZACITIDINE 140 MG: 100 INJECTION, POWDER, LYOPHILIZED, FOR SOLUTION INTRAVENOUS; SUBCUTANEOUS at 11:07

## 2023-07-10 RX ADMIN — ONDANSETRON HYDROCHLORIDE 16 MG: 8 TABLET, FILM COATED ORAL at 10:07

## 2023-07-10 NOTE — NURSING
Patient tolerated C2D1 Vidaza injection well, no complaints at this time. Pt scheduled for C2D2 and blood transfusion tomorrow, verbalized understanding. Pt d/c in no acute distress.

## 2023-07-11 ENCOUNTER — INFUSION (OUTPATIENT)
Dept: INFUSION THERAPY | Facility: HOSPITAL | Age: 68
End: 2023-07-11
Attending: INTERNAL MEDICINE
Payer: MEDICARE

## 2023-07-11 ENCOUNTER — OFFICE VISIT (OUTPATIENT)
Dept: HEMATOLOGY/ONCOLOGY | Facility: CLINIC | Age: 68
End: 2023-07-11
Payer: MEDICARE

## 2023-07-11 VITALS
RESPIRATION RATE: 16 BRPM | HEIGHT: 69 IN | HEART RATE: 80 BPM | BODY MASS INDEX: 23.87 KG/M2 | OXYGEN SATURATION: 97 % | WEIGHT: 161.19 LBS | DIASTOLIC BLOOD PRESSURE: 58 MMHG | TEMPERATURE: 99 F | SYSTOLIC BLOOD PRESSURE: 106 MMHG

## 2023-07-11 VITALS
TEMPERATURE: 98 F | RESPIRATION RATE: 18 BRPM | BODY MASS INDEX: 23.64 KG/M2 | HEART RATE: 81 BPM | DIASTOLIC BLOOD PRESSURE: 56 MMHG | OXYGEN SATURATION: 98 % | HEIGHT: 69 IN | SYSTOLIC BLOOD PRESSURE: 103 MMHG | WEIGHT: 159.63 LBS

## 2023-07-11 DIAGNOSIS — D46.9 MYELODYSPLASTIC SYNDROME: Primary | ICD-10-CM

## 2023-07-11 DIAGNOSIS — D61.818 PANCYTOPENIA: ICD-10-CM

## 2023-07-11 DIAGNOSIS — D84.81 IMMUNODEFICIENCY SECONDARY TO NEOPLASM: ICD-10-CM

## 2023-07-11 DIAGNOSIS — Z76.82 STEM CELL TRANSPLANT CANDIDATE: ICD-10-CM

## 2023-07-11 DIAGNOSIS — D49.9 IMMUNODEFICIENCY SECONDARY TO NEOPLASM: ICD-10-CM

## 2023-07-11 DIAGNOSIS — D64.9 SYMPTOMATIC ANEMIA: ICD-10-CM

## 2023-07-11 PROCEDURE — 1126F PR PAIN SEVERITY QUANTIFIED, NO PAIN PRESENT: ICD-10-PCS | Mod: CPTII,S$GLB,, | Performed by: INTERNAL MEDICINE

## 2023-07-11 PROCEDURE — 1159F PR MEDICATION LIST DOCUMENTED IN MEDICAL RECORD: ICD-10-PCS | Mod: CPTII,S$GLB,, | Performed by: INTERNAL MEDICINE

## 2023-07-11 PROCEDURE — 3078F PR MOST RECENT DIASTOLIC BLOOD PRESSURE < 80 MM HG: ICD-10-PCS | Mod: CPTII,S$GLB,, | Performed by: INTERNAL MEDICINE

## 2023-07-11 PROCEDURE — P9040 RBC LEUKOREDUCED IRRADIATED: HCPCS | Performed by: INTERNAL MEDICINE

## 2023-07-11 PROCEDURE — 3074F PR MOST RECENT SYSTOLIC BLOOD PRESSURE < 130 MM HG: ICD-10-PCS | Mod: CPTII,S$GLB,, | Performed by: INTERNAL MEDICINE

## 2023-07-11 PROCEDURE — 96401 CHEMO ANTI-NEOPL SQ/IM: CPT

## 2023-07-11 PROCEDURE — 25000003 PHARM REV CODE 250: Performed by: INTERNAL MEDICINE

## 2023-07-11 PROCEDURE — 1101F PT FALLS ASSESS-DOCD LE1/YR: CPT | Mod: CPTII,S$GLB,, | Performed by: INTERNAL MEDICINE

## 2023-07-11 PROCEDURE — 1126F AMNT PAIN NOTED NONE PRSNT: CPT | Mod: CPTII,S$GLB,, | Performed by: INTERNAL MEDICINE

## 2023-07-11 PROCEDURE — 3008F BODY MASS INDEX DOCD: CPT | Mod: CPTII,S$GLB,, | Performed by: INTERNAL MEDICINE

## 2023-07-11 PROCEDURE — 36430 TRANSFUSION BLD/BLD COMPNT: CPT

## 2023-07-11 PROCEDURE — 1160F PR REVIEW ALL MEDS BY PRESCRIBER/CLIN PHARMACIST DOCUMENTED: ICD-10-PCS | Mod: CPTII,S$GLB,, | Performed by: INTERNAL MEDICINE

## 2023-07-11 PROCEDURE — 63600175 PHARM REV CODE 636 W HCPCS: Performed by: INTERNAL MEDICINE

## 2023-07-11 PROCEDURE — 3288F FALL RISK ASSESSMENT DOCD: CPT | Mod: CPTII,S$GLB,, | Performed by: INTERNAL MEDICINE

## 2023-07-11 PROCEDURE — 3008F PR BODY MASS INDEX (BMI) DOCUMENTED: ICD-10-PCS | Mod: CPTII,S$GLB,, | Performed by: INTERNAL MEDICINE

## 2023-07-11 PROCEDURE — 99215 PR OFFICE/OUTPT VISIT, EST, LEVL V, 40-54 MIN: ICD-10-PCS | Mod: S$GLB,,, | Performed by: INTERNAL MEDICINE

## 2023-07-11 PROCEDURE — 99999 PR PBB SHADOW E&M-EST. PATIENT-LVL IV: ICD-10-PCS | Mod: PBBFAC,,, | Performed by: INTERNAL MEDICINE

## 2023-07-11 PROCEDURE — 99999 PR PBB SHADOW E&M-EST. PATIENT-LVL IV: CPT | Mod: PBBFAC,,, | Performed by: INTERNAL MEDICINE

## 2023-07-11 PROCEDURE — 1101F PR PT FALLS ASSESS DOC 0-1 FALLS W/OUT INJ PAST YR: ICD-10-PCS | Mod: CPTII,S$GLB,, | Performed by: INTERNAL MEDICINE

## 2023-07-11 PROCEDURE — 3074F SYST BP LT 130 MM HG: CPT | Mod: CPTII,S$GLB,, | Performed by: INTERNAL MEDICINE

## 2023-07-11 PROCEDURE — 3078F DIAST BP <80 MM HG: CPT | Mod: CPTII,S$GLB,, | Performed by: INTERNAL MEDICINE

## 2023-07-11 PROCEDURE — 1159F MED LIST DOCD IN RCRD: CPT | Mod: CPTII,S$GLB,, | Performed by: INTERNAL MEDICINE

## 2023-07-11 PROCEDURE — 3288F PR FALLS RISK ASSESSMENT DOCUMENTED: ICD-10-PCS | Mod: CPTII,S$GLB,, | Performed by: INTERNAL MEDICINE

## 2023-07-11 PROCEDURE — 1160F RVW MEDS BY RX/DR IN RCRD: CPT | Mod: CPTII,S$GLB,, | Performed by: INTERNAL MEDICINE

## 2023-07-11 PROCEDURE — 99215 OFFICE O/P EST HI 40 MIN: CPT | Mod: S$GLB,,, | Performed by: INTERNAL MEDICINE

## 2023-07-11 RX ORDER — HYDROCODONE BITARTRATE AND ACETAMINOPHEN 500; 5 MG/1; MG/1
TABLET ORAL ONCE
Status: COMPLETED | OUTPATIENT
Start: 2023-07-11 | End: 2023-07-11

## 2023-07-11 RX ORDER — ONDANSETRON HYDROCHLORIDE 8 MG/1
16 TABLET, FILM COATED ORAL
Status: COMPLETED | OUTPATIENT
Start: 2023-07-11 | End: 2023-07-11

## 2023-07-11 RX ORDER — ACETAMINOPHEN 325 MG/1
650 TABLET ORAL
Status: DISCONTINUED | OUTPATIENT
Start: 2023-07-11 | End: 2023-07-11 | Stop reason: HOSPADM

## 2023-07-11 RX ORDER — DIPHENHYDRAMINE HCL 25 MG
25 CAPSULE ORAL
Status: DISCONTINUED | OUTPATIENT
Start: 2023-07-11 | End: 2023-07-11 | Stop reason: HOSPADM

## 2023-07-11 RX ORDER — AZACITIDINE 100 MG/1
75 INJECTION, POWDER, LYOPHILIZED, FOR SOLUTION INTRAVENOUS; SUBCUTANEOUS
Status: COMPLETED | OUTPATIENT
Start: 2023-07-11 | End: 2023-07-11

## 2023-07-11 RX ADMIN — ONDANSETRON HYDROCHLORIDE 16 MG: 8 TABLET, FILM COATED ORAL at 09:07

## 2023-07-11 RX ADMIN — SODIUM CHLORIDE: 9 INJECTION, SOLUTION INTRAVENOUS at 09:07

## 2023-07-11 RX ADMIN — AZACITIDINE 140 MG: 100 INJECTION, POWDER, LYOPHILIZED, FOR SOLUTION INTRAVENOUS; SUBCUTANEOUS at 09:07

## 2023-07-11 NOTE — PROGRESS NOTES
Section of Hematology and Stem Cell Transplantation  Follow Up Visit     Date of visit: 7/11/23  Visit diagnosis: Myelodysplastic syndrome [D46.9]  Referred by:  Harry Diamond MD    Oncologic History:     Primary Oncologic Diagnosis: Myelodysplastic syndrome excess blasts 2, IPSS-M very high risk    4/12/23: Initial evaluation by Dr. Diamond of anemia. WBC 2.71, Hgb 7.1, Plts  400. Prior to this visit, he was in [place] for a dental procedure. His symptoms of fatigue started after extractions. Workup by Dr. Diamond unremarkable.   4/26/23: Bone marrow biopsy revealed a hypercellular marrow (80-90%) with increased blasts (8-12%) concerning for MDS EB2. However, sample was limited.   5/23/23: Repeat bone marrow biopsy revealed myelodysplastic syndrome with excess blasts 2 (blasts 16-17%). 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 NGS with ASXL1 (44%), EZH2 (87%), IDH2 (42%), STAG2 (89%), U2AF1 (3%).  6/12/23: Cycle 1 of azacitidine 75 mg/m2 plus venetoclax x14 of 28 days.   7/3/23: Bone marrow biopsy at the end of cycle 1 revealed a hypocellular marrow, no blasts, trilineage hypoplasia and dyspoiesis with increased micromegakaryocytes. FISH with trisomy 8 (three copies of XXFH0D2). NGS pending.    History of Present Ilness:   Guillaume Salinas (Guillaume) is a pleasant 67 y.o.male with PVD, CAD, HTN who presents for follow up. He continues to tolerate treatment well. He is having some constipation. No significant nausea.     Patient was seen today in conjunction with a .    PAST MEDICAL HISTORY:   Past Medical History:   Diagnosis Date    Anticoagulant long-term use     Coronary artery disease     Hypertension     Peripheral vascular disease, unspecified        PAST SURGICAL HISTORY:   Past Surgical History:   Procedure Laterality Date    BONE MARROW BIOPSY Left 4/26/2023    Procedure: Biopsy-bone marrow;  Surgeon: Harry Diamond MD;  Location: [facility];  Service: Oncology;  Laterality: Left;     COLONOSCOPY N/A 2022    Procedure: COLONOSCOPY Golytely Vaccinated will bring cards;  Surgeon: Dereje Simon MD;  Location: [facility];  Service: Endoscopy;  Laterality: N/A;  Do not cancel this order       PAST SOCIAL HISTORY:  Social History     Tobacco Use    Smoking status: Former     Packs/day: 0.25     Years: 50.00     Pack years: 12.50     Types: Cigarettes     Quit date: 3/1/2023     Years since quittin.3    Smokeless tobacco: Never   Substance Use Topics    Alcohol use: Not Currently    Drug use: Never       FAMILY HISTORY:  Family History   Problem Relation Age of Onset    Hypertension Mother     Cancer Father     Cancer Brother     No Known Problems Daughter     No Known Problems Daughter     No Known Problems Son        CURRENT MEDICATIONS:   Current Outpatient Medications   Medication Sig    acyclovir (ZOVIRAX) 400 MG tablet Take 1 tablet (400 mg total) by mouth 2 (two) times daily.    allopurinoL (ZYLOPRIM) 300 MG tablet Take 1 tablet (300 mg total) by mouth 2 (two) times daily.    aspirin (ECOTRIN) 81 MG EC tablet Take 1 tablet (81 mg total) by mouth once daily.    carvediloL (COREG) 6.25 MG tablet TAKE 1 TABLET(6.25 MG) BY MOUTH TWICE DAILY WITH MEALS    cilostazoL (PLETAL) 50 MG Tab Take 1 tablet (50 mg total) by mouth 2 (two) times daily.    docusate sodium (COLACE) 100 MG capsule Take 100 mg by mouth 2 (two) times daily as needed for Constipation.    gabapentin (NEURONTIN) 300 MG capsule Take 1 capsule (300 mg total) by mouth 3 (three) times daily.    levoFLOXacin (LEVAQUIN) 500 MG tablet Take 1 tablet (500 mg total) by mouth once daily.    promethazine (PHENERGAN) 25 MG tablet Take 1 tablet (25 mg total) by mouth every 8 (eight) hours as needed for Nausea.    venetoclax (VENCLEXTA) 100 mg Tab Take 1 tablet (100 mg) by mouth once daily on days 3-14 of a 28-day cycle (FOR CYCLE 1 ONLY). Do not discard any remaining tablets.    venetoclax (VENCLEXTA) 100 mg Tab Take 1 tablet (100  mg) by mouth once daily on days 1-14 of a 28-day cycle. Do not discard any remaining tablets.    voriconazole (VFEND) 200 MG Tab Take 1 tablet (200 mg total) by mouth 2 (two) times daily.    zolpidem (AMBIEN) 10 mg Tab Take 10 mg by mouth nightly as needed.    atorvastatin (LIPITOR) 80 MG tablet Take 1 tablet (80 mg total) by mouth once daily. (Patient not taking: Reported on 6/29/2023)     No current facility-administered medications for this visit.       ALLERGIES:   Review of patient's allergies indicates:  No Known Allergies    Review of Systems:     Pertinent positives and negatives included in the HPI. Otherwise a complete review of systems is negative.    Physical Exam:     Vitals:    07/11/23 1557   BP: (!) 106/58   Pulse: 80   Resp: 16   Temp: 99 °F (37.2 °C)     General: Appears well, NAD  Pulmonary: CTAB, no increased work of breathing, no W/R/C  Cardiovascular: S1S2 normal, RRR, no M/R/G  Abdominal: Soft, NT, ND, BS+, no HSM  Extremities: No C/C/E  Neurological: AAOx4, grossly normal, no focal deficits  Dermatologic: No appreciable rashes or lesions  Lymphatic: No cervical, axillary, or inguinal lymphadenopathy     ECOG Performance Status: (foot note - ECOG PS provided by Eastern Cooperative Oncology Group) 1 - Symptomatic but completely ambulatory    Karnofsky Performance Score:  80%- Normal Activity with Effort: Some Symptoms of Disease    Labs:   Lab Results   Component Value Date    WBC 1.17 (LL) 07/10/2023    WBC 1.17 (LL) 07/10/2023    RBC 2.25 (L) 07/10/2023    RBC 2.25 (L) 07/10/2023    HGB 6.4 (L) 07/10/2023    HGB 6.4 (L) 07/10/2023    HCT 19.5 (LL) 07/10/2023    HCT 19.5 (LL) 07/10/2023    MCV 87 07/10/2023    MCV 87 07/10/2023    MCH 28.4 07/10/2023    MCH 28.4 07/10/2023    MCHC 32.8 07/10/2023    MCHC 32.8 07/10/2023    RDW 14.6 (H) 07/10/2023    RDW 14.6 (H) 07/10/2023    PLT 41 (L) 07/10/2023    PLT 41 (L) 07/10/2023    MPV 12.0 07/10/2023    MPV 12.0 07/10/2023    GRAN 0.1 (L) 07/10/2023     GRAN 5.9 (L) 07/10/2023    GRAN 0.1 (L) 07/10/2023    GRAN 5.9 (L) 07/10/2023    LYMPH 1.1 07/10/2023    LYMPH 93.2 (H) 07/10/2023    LYMPH 1.1 07/10/2023    LYMPH 93.2 (H) 07/10/2023    MONO 0.0 (L) 07/10/2023    MONO 0.9 (L) 07/10/2023    MONO 0.0 (L) 07/10/2023    MONO 0.9 (L) 07/10/2023    EOS 0.0 07/10/2023    EOS 0.0 07/10/2023    BASO 0.00 07/10/2023    BASO 0.00 07/10/2023    EOSINOPHIL 0.0 07/10/2023    EOSINOPHIL 0.0 07/10/2023    BASOPHIL 0.0 07/10/2023    BASOPHIL 0.0 07/10/2023       CMP  Sodium   Date Value Ref Range Status   07/10/2023 140 136 - 145 mmol/L Final   07/10/2023 140 136 - 145 mmol/L Final     Potassium   Date Value Ref Range Status   07/10/2023 4.4 3.5 - 5.1 mmol/L Final   07/10/2023 4.4 3.5 - 5.1 mmol/L Final     Chloride   Date Value Ref Range Status   07/10/2023 107 95 - 110 mmol/L Final   07/10/2023 107 95 - 110 mmol/L Final     CO2   Date Value Ref Range Status   07/10/2023 23 23 - 29 mmol/L Final   07/10/2023 23 23 - 29 mmol/L Final     Glucose   Date Value Ref Range Status   07/10/2023 113 (H) 70 - 110 mg/dL Final   07/10/2023 113 (H) 70 - 110 mg/dL Final     BUN   Date Value Ref Range Status   07/10/2023 20 8 - 23 mg/dL Final   07/10/2023 20 8 - 23 mg/dL Final     Creatinine   Date Value Ref Range Status   07/10/2023 1.1 0.5 - 1.4 mg/dL Final   07/10/2023 1.1 0.5 - 1.4 mg/dL Final     Calcium   Date Value Ref Range Status   07/10/2023 9.0 8.7 - 10.5 mg/dL Final   07/10/2023 9.0 8.7 - 10.5 mg/dL Final     Total Protein   Date Value Ref Range Status   07/10/2023 6.9 6.0 - 8.4 g/dL Final   07/10/2023 6.9 6.0 - 8.4 g/dL Final     Albumin   Date Value Ref Range Status   07/10/2023 3.7 3.5 - 5.2 g/dL Final   07/10/2023 3.7 3.5 - 5.2 g/dL Final     Total Bilirubin   Date Value Ref Range Status   07/10/2023 0.2 0.1 - 1.0 mg/dL Final     Comment:     For infants and newborns, interpretation of results should be based  on gestational age, weight and in agreement with  clinical  observations.    Premature Infant recommended reference ranges:  Up to 24 hours.............<8.0 mg/dL  Up to 48 hours............<12.0 mg/dL  3-5 days..................<15.0 mg/dL  6-29 days.................<15.0 mg/dL     07/10/2023 0.2 0.1 - 1.0 mg/dL Final     Comment:     For infants and newborns, interpretation of results should be based  on gestational age, weight and in agreement with clinical  observations.    Premature Infant recommended reference ranges:  Up to 24 hours.............<8.0 mg/dL  Up to 48 hours............<12.0 mg/dL  3-5 days..................<15.0 mg/dL  6-29 days.................<15.0 mg/dL       Alkaline Phosphatase   Date Value Ref Range Status   07/10/2023 87 55 - 135 U/L Final   07/10/2023 87 55 - 135 U/L Final     AST   Date Value Ref Range Status   07/10/2023 19 10 - 40 U/L Final   07/10/2023 19 10 - 40 U/L Final     ALT   Date Value Ref Range Status   07/10/2023 22 10 - 44 U/L Final   07/10/2023 22 10 - 44 U/L Final     Anion Gap   Date Value Ref Range Status   07/10/2023 10 8 - 16 mmol/L Final   07/10/2023 10 8 - 16 mmol/L Final     eGFR if    Date Value Ref Range Status   08/27/2021 >60 >60 mL/min/1.73 m^2 Final   08/27/2021 >60 >60 mL/min/1.73 m^2 Final   08/27/2021 >60 >60 mL/min/1.73 m^2 Final     eGFR if non    Date Value Ref Range Status   08/27/2021 57 (A) >60 mL/min/1.73 m^2 Final     Comment:     Calculation used to obtain the estimated glomerular filtration  rate (eGFR) is the CKD-EPI equation.      08/27/2021 57 (A) >60 mL/min/1.73 m^2 Final     Comment:     Calculation used to obtain the estimated glomerular filtration  rate (eGFR) is the CKD-EPI equation.      08/27/2021 57 (A) >60 mL/min/1.73 m^2 Final     Comment:     Calculation used to obtain the estimated glomerular filtration  rate (eGFR) is the CKD-EPI equation.          Imaging:   No results found for this or any previous visit (from the past 2160  hour(s)).    Pathology:  Reviewed     Assessment and Plan:   Guillaume Salinas (Guillaume) is a pleasant 67 y.o.male with PVD, CAD, HTN who presents for follow up.    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 NGS with ASXL1 (44%), EZH2 (87%), IDH2 (42%), STAG2 (89%), U2AF1 (3%). He started treatment with azacitidine 75 mg/m2 daily x7 days plus venetoclax 100mg (voriconazole) daily x14 of 28 days.  Continue azacitidine plus venetoclax x14 of 28 days as above. Ok to decrease azacitidine to 5 days.  Prior to starting each cycle - ANC >500 and Plts >50.  Repeat bone marrow biopsy at the end of cycle 3.    Stem cell transplant candidate: We discussed the role for allogeneic stem cell transplantation in this disease process as a potentially curative option. We had an extensive discussion about the rationale, logistics, risks, and benefits. We reviewed the requirement to stay in the [place] area for 100 days with a caregiver at all times. We discussed the risks, including infection, graft failure, organ toxicity, graft versus host disease, relapse of disease, and secondary cancers. We reviewed the need for long-term immunosuppression and need for close monitoring. HCT-CI of 1 (intermediate risk). Will plan to proceed with transplant in the next 2-3 months (as soon as a donor is identified). HLA typing sent.   Coordinator: Fay Stephens  Regimen: pending donor  Donor: pending  Graft source: pending donor    Stem cell donors: 10 siblings (9 are over age 65). One sister (age 63) - Radha. Segundo Magdaleno (age 43), Padmini Rush (age 39), Susy (age 35).   Will type sister, children, and search for MUD.    Symptomatic anemia: Related to MDS. Twice weekly monitoring in [place]. Transfuse for Hgb <7.    Thrombocytopenia: Related to MDS. Monitor and transfuse for Plts <10.    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     At high risk for tumor lysis syndrome: Ok to stop allopurinol.     Orders/Follow Up:           Route Chart for Scheduling    BMT Chart Routing      Follow up with physician 4 weeks.   Follow up with [facility]    Provider visit type    Infusion scheduling note    Injection scheduling note    Labs   Scheduling:  Preferred lab:  Lab interval:  Labs twice weekly at [facility]   Imaging None      Pharmacy appointment No pharmacy appointment needed      Other referrals no referral to Oncology Primary Care needed -    No additional referrals needed         Treatment Plan Information   OP AZACITIDINE 7-DAY (SUB-Q)   Harry Diamond MD   Upcoming Treatment Dates - OP AZACITIDINE 7-DAY (SUB-Q)    7/12/2023       Pre-Medications       ondansetron tablet 16 mg       Chemotherapy       azaCITIDine (VIDAZA) chemo injection 140 mg  7/13/2023       Pre-Medications       ondansetron tablet 16 mg       Chemotherapy       azaCITIDine (VIDAZA) chemo injection 140 mg  7/14/2023       Pre-Medications       ondansetron tablet 16 mg       Chemotherapy       azaCITIDine (VIDAZA) chemo injection 140 mg  7/17/2023       Pre-Medications       ondansetron tablet 16 mg       Chemotherapy       azaCITIDine (VIDAZA) chemo injection 140 mg    Advance Care Planning   Date: 05/08/2023  We reviewed his underlying diagnosis including natural history, prognosis, and various treatment options. He remains interested in pursuing any and all treatment options in an effort to improve his quality and quantity of life. Will discuss treatment options pending biopsy results.        Total time of this visit was 40 minutes, including time spent face to face with patient, and also in preparing for today's visit for MDM and documentation. (Medical Decision Making, including consideration of possible diagnoses, management options, complex medical record review, review of diagnostic tests and information, consideration and discussion of significant  complications based on comorbidities, and discussion with providers involved with the care of the patient). Greater than 50% was spent face to face with the patient counseling and coordinating care.    Paco Hickey MD  Hematology, Oncology, and Stem Cell Transplantation  [facility]

## 2023-07-11 NOTE — NURSING
Pt tolerated 1 unit of PRBC infusion well.  No adverse reaction noted.   IV flushed with NS and D/C per protocol.  Patient tolerated vidaza injection well, d/c in no acute distress.

## 2023-07-11 NOTE — Clinical Note
Matti Mcdonald, Since he cleared the excess blasts, I think it's reasonable to decrease azacitidine to 5 days. I forgot to mention that to them in my visit today, but I will send them a message. Do you mind letting the infusion suite know since he is currently receiving this cycle? Also since he has now cleared the blasts, for cycle 3 I usually wait until ANC >500 and Plts >50. We're still waiting for HLA results! I'll continue to follow closely with you. Thanks! Paco

## 2023-07-12 ENCOUNTER — INFUSION (OUTPATIENT)
Dept: INFUSION THERAPY | Facility: HOSPITAL | Age: 68
End: 2023-07-12
Attending: INTERNAL MEDICINE
Payer: MEDICARE

## 2023-07-12 ENCOUNTER — PATIENT MESSAGE (OUTPATIENT)
Dept: INTERNAL MEDICINE | Facility: CLINIC | Age: 68
End: 2023-07-12
Payer: MEDICARE

## 2023-07-12 VITALS
SYSTOLIC BLOOD PRESSURE: 128 MMHG | HEART RATE: 83 BPM | RESPIRATION RATE: 18 BRPM | HEIGHT: 69 IN | DIASTOLIC BLOOD PRESSURE: 68 MMHG | TEMPERATURE: 98 F | WEIGHT: 161.19 LBS | BODY MASS INDEX: 23.87 KG/M2 | OXYGEN SATURATION: 98 %

## 2023-07-12 DIAGNOSIS — D46.9 MYELODYSPLASTIC SYNDROME: Primary | ICD-10-CM

## 2023-07-12 PROCEDURE — 25000003 PHARM REV CODE 250: Performed by: INTERNAL MEDICINE

## 2023-07-12 PROCEDURE — 96401 CHEMO ANTI-NEOPL SQ/IM: CPT

## 2023-07-12 PROCEDURE — 63600175 PHARM REV CODE 636 W HCPCS: Performed by: INTERNAL MEDICINE

## 2023-07-12 RX ORDER — ONDANSETRON HYDROCHLORIDE 8 MG/1
16 TABLET, FILM COATED ORAL
Status: COMPLETED | OUTPATIENT
Start: 2023-07-12 | End: 2023-07-12

## 2023-07-12 RX ORDER — AZACITIDINE 100 MG/1
75 INJECTION, POWDER, LYOPHILIZED, FOR SOLUTION INTRAVENOUS; SUBCUTANEOUS
Status: COMPLETED | OUTPATIENT
Start: 2023-07-12 | End: 2023-07-12

## 2023-07-12 RX ADMIN — AZACITIDINE 140 MG: 100 INJECTION, POWDER, LYOPHILIZED, FOR SOLUTION INTRAVENOUS; SUBCUTANEOUS at 09:07

## 2023-07-12 RX ADMIN — ONDANSETRON HYDROCHLORIDE 16 MG: 8 TABLET, FILM COATED ORAL at 09:07

## 2023-07-13 ENCOUNTER — INFUSION (OUTPATIENT)
Dept: INFUSION THERAPY | Facility: HOSPITAL | Age: 68
End: 2023-07-13
Attending: INTERNAL MEDICINE
Payer: MEDICARE

## 2023-07-13 VITALS
WEIGHT: 161.19 LBS | HEIGHT: 69 IN | HEART RATE: 85 BPM | SYSTOLIC BLOOD PRESSURE: 122 MMHG | TEMPERATURE: 98 F | RESPIRATION RATE: 16 BRPM | DIASTOLIC BLOOD PRESSURE: 68 MMHG | OXYGEN SATURATION: 95 % | BODY MASS INDEX: 23.87 KG/M2

## 2023-07-13 DIAGNOSIS — D46.9 MYELODYSPLASTIC SYNDROME: Primary | ICD-10-CM

## 2023-07-13 PROCEDURE — 63600175 PHARM REV CODE 636 W HCPCS: Performed by: INTERNAL MEDICINE

## 2023-07-13 PROCEDURE — 25000003 PHARM REV CODE 250: Performed by: INTERNAL MEDICINE

## 2023-07-13 PROCEDURE — 96401 CHEMO ANTI-NEOPL SQ/IM: CPT

## 2023-07-13 RX ORDER — AZACITIDINE 100 MG/1
75 INJECTION, POWDER, LYOPHILIZED, FOR SOLUTION INTRAVENOUS; SUBCUTANEOUS
Status: COMPLETED | OUTPATIENT
Start: 2023-07-13 | End: 2023-07-13

## 2023-07-13 RX ORDER — ONDANSETRON HYDROCHLORIDE 8 MG/1
16 TABLET, FILM COATED ORAL
Status: COMPLETED | OUTPATIENT
Start: 2023-07-13 | End: 2023-07-13

## 2023-07-13 RX ADMIN — ONDANSETRON HYDROCHLORIDE 16 MG: 8 TABLET, FILM COATED ORAL at 09:07

## 2023-07-13 RX ADMIN — AZACITIDINE 140 MG: 100 INJECTION, POWDER, LYOPHILIZED, FOR SOLUTION INTRAVENOUS; SUBCUTANEOUS at 09:07

## 2023-07-14 ENCOUNTER — INFUSION (OUTPATIENT)
Dept: INFUSION THERAPY | Facility: HOSPITAL | Age: 68
End: 2023-07-14
Attending: INTERNAL MEDICINE
Payer: MEDICARE

## 2023-07-14 VITALS
DIASTOLIC BLOOD PRESSURE: 72 MMHG | HEIGHT: 69 IN | SYSTOLIC BLOOD PRESSURE: 122 MMHG | RESPIRATION RATE: 17 BRPM | OXYGEN SATURATION: 99 % | WEIGHT: 161.19 LBS | BODY MASS INDEX: 23.87 KG/M2 | TEMPERATURE: 99 F | HEART RATE: 92 BPM

## 2023-07-14 DIAGNOSIS — D46.9 MYELODYSPLASTIC SYNDROME: Primary | ICD-10-CM

## 2023-07-14 PROCEDURE — 96401 CHEMO ANTI-NEOPL SQ/IM: CPT

## 2023-07-14 PROCEDURE — 25000003 PHARM REV CODE 250: Performed by: INTERNAL MEDICINE

## 2023-07-14 PROCEDURE — 63600175 PHARM REV CODE 636 W HCPCS: Performed by: INTERNAL MEDICINE

## 2023-07-14 RX ORDER — ONDANSETRON HYDROCHLORIDE 8 MG/1
16 TABLET, FILM COATED ORAL
Status: COMPLETED | OUTPATIENT
Start: 2023-07-14 | End: 2023-07-14

## 2023-07-14 RX ORDER — AZACITIDINE 100 MG/1
75 INJECTION, POWDER, LYOPHILIZED, FOR SOLUTION INTRAVENOUS; SUBCUTANEOUS
Status: COMPLETED | OUTPATIENT
Start: 2023-07-14 | End: 2023-07-14

## 2023-07-14 RX ADMIN — AZACITIDINE 140 MG: 100 INJECTION, POWDER, LYOPHILIZED, FOR SOLUTION INTRAVENOUS; SUBCUTANEOUS at 09:07

## 2023-07-14 RX ADMIN — ONDANSETRON HYDROCHLORIDE 16 MG: 8 TABLET, FILM COATED ORAL at 09:07

## 2023-07-14 NOTE — NURSING
Patient tolerated Cycle 2 Day 5 Vidaza injection well, no complaints at this time. Pt d/c in no acute distress.

## 2023-07-17 ENCOUNTER — LAB VISIT (OUTPATIENT)
Dept: LAB | Facility: HOSPITAL | Age: 68
End: 2023-07-17
Attending: INTERNAL MEDICINE
Payer: MEDICARE

## 2023-07-17 ENCOUNTER — TELEPHONE (OUTPATIENT)
Dept: HEMATOLOGY/ONCOLOGY | Facility: CLINIC | Age: 68
End: 2023-07-17
Payer: MEDICARE

## 2023-07-17 ENCOUNTER — DOCUMENTATION ONLY (OUTPATIENT)
Dept: HEMATOLOGY/ONCOLOGY | Facility: CLINIC | Age: 68
End: 2023-07-17
Payer: MEDICARE

## 2023-07-17 DIAGNOSIS — D46.9 MYELODYSPLASTIC SYNDROME: ICD-10-CM

## 2023-07-17 LAB
ABO + RH BLD: NORMAL
ALBUMIN SERPL BCP-MCNC: 3.9 G/DL (ref 3.5–5.2)
ALBUMIN SERPL BCP-MCNC: 3.9 G/DL (ref 3.5–5.2)
ALP SERPL-CCNC: 92 U/L (ref 55–135)
ALP SERPL-CCNC: 92 U/L (ref 55–135)
ALT SERPL W/O P-5'-P-CCNC: 18 U/L (ref 10–44)
ALT SERPL W/O P-5'-P-CCNC: 18 U/L (ref 10–44)
ANION GAP SERPL CALC-SCNC: 8 MMOL/L (ref 8–16)
ANION GAP SERPL CALC-SCNC: 8 MMOL/L (ref 8–16)
ANISOCYTOSIS BLD QL SMEAR: ABNORMAL
ANISOCYTOSIS BLD QL SMEAR: ABNORMAL
AST SERPL-CCNC: 16 U/L (ref 10–40)
AST SERPL-CCNC: 16 U/L (ref 10–40)
BASOPHILS # BLD AUTO: 0.01 K/UL (ref 0–0.2)
BASOPHILS # BLD AUTO: 0.01 K/UL (ref 0–0.2)
BASOPHILS NFR BLD: 0.7 % (ref 0–1.9)
BASOPHILS NFR BLD: 0.7 % (ref 0–1.9)
BILIRUB SERPL-MCNC: 0.3 MG/DL (ref 0.1–1)
BILIRUB SERPL-MCNC: 0.3 MG/DL (ref 0.1–1)
BLD GP AB SCN CELLS X3 SERPL QL: NORMAL
BUN SERPL-MCNC: 19 MG/DL (ref 8–23)
BUN SERPL-MCNC: 19 MG/DL (ref 8–23)
CALCIUM SERPL-MCNC: 9.6 MG/DL (ref 8.7–10.5)
CALCIUM SERPL-MCNC: 9.6 MG/DL (ref 8.7–10.5)
CHLORIDE SERPL-SCNC: 105 MMOL/L (ref 95–110)
CHLORIDE SERPL-SCNC: 105 MMOL/L (ref 95–110)
CO2 SERPL-SCNC: 26 MMOL/L (ref 23–29)
CO2 SERPL-SCNC: 26 MMOL/L (ref 23–29)
CREAT SERPL-MCNC: 1.2 MG/DL (ref 0.5–1.4)
CREAT SERPL-MCNC: 1.2 MG/DL (ref 0.5–1.4)
DIFFERENTIAL METHOD: ABNORMAL
DIFFERENTIAL METHOD: ABNORMAL
EOSINOPHIL # BLD AUTO: 0 K/UL (ref 0–0.5)
EOSINOPHIL # BLD AUTO: 0 K/UL (ref 0–0.5)
EOSINOPHIL NFR BLD: 0 % (ref 0–8)
EOSINOPHIL NFR BLD: 0 % (ref 0–8)
ERYTHROCYTE [DISTWIDTH] IN BLOOD BY AUTOMATED COUNT: 15.1 % (ref 11.5–14.5)
ERYTHROCYTE [DISTWIDTH] IN BLOOD BY AUTOMATED COUNT: 15.1 % (ref 11.5–14.5)
EST. GFR  (NO RACE VARIABLE): >60 ML/MIN/1.73 M^2
EST. GFR  (NO RACE VARIABLE): >60 ML/MIN/1.73 M^2
GLUCOSE SERPL-MCNC: 134 MG/DL (ref 70–110)
GLUCOSE SERPL-MCNC: 134 MG/DL (ref 70–110)
HCT VFR BLD AUTO: 24.9 % (ref 40–54)
HCT VFR BLD AUTO: 24.9 % (ref 40–54)
HGB BLD-MCNC: 8 G/DL (ref 14–18)
HGB BLD-MCNC: 8 G/DL (ref 14–18)
HYPOCHROMIA BLD QL SMEAR: ABNORMAL
HYPOCHROMIA BLD QL SMEAR: ABNORMAL
IMM GRANULOCYTES # BLD AUTO: 0.01 K/UL (ref 0–0.04)
IMM GRANULOCYTES # BLD AUTO: 0.01 K/UL (ref 0–0.04)
IMM GRANULOCYTES NFR BLD AUTO: 0.7 % (ref 0–0.5)
IMM GRANULOCYTES NFR BLD AUTO: 0.7 % (ref 0–0.5)
LDH SERPL L TO P-CCNC: 144 U/L (ref 110–260)
LYMPHOCYTES # BLD AUTO: 1.1 K/UL (ref 1–4.8)
LYMPHOCYTES # BLD AUTO: 1.1 K/UL (ref 1–4.8)
LYMPHOCYTES NFR BLD: 79.9 % (ref 18–48)
LYMPHOCYTES NFR BLD: 79.9 % (ref 18–48)
MAGNESIUM SERPL-MCNC: 2.2 MG/DL (ref 1.6–2.6)
MCH RBC QN AUTO: 28.8 PG (ref 27–31)
MCH RBC QN AUTO: 28.8 PG (ref 27–31)
MCHC RBC AUTO-ENTMCNC: 32.1 G/DL (ref 32–36)
MCHC RBC AUTO-ENTMCNC: 32.1 G/DL (ref 32–36)
MCV RBC AUTO: 90 FL (ref 82–98)
MCV RBC AUTO: 90 FL (ref 82–98)
MONOCYTES # BLD AUTO: 0.2 K/UL (ref 0.3–1)
MONOCYTES # BLD AUTO: 0.2 K/UL (ref 0.3–1)
MONOCYTES NFR BLD: 12.7 % (ref 4–15)
MONOCYTES NFR BLD: 12.7 % (ref 4–15)
NEUTROPHILS # BLD AUTO: 0.1 K/UL (ref 1.8–7.7)
NEUTROPHILS # BLD AUTO: 0.1 K/UL (ref 1.8–7.7)
NEUTROPHILS NFR BLD: 6 % (ref 38–73)
NEUTROPHILS NFR BLD: 6 % (ref 38–73)
NRBC BLD-RTO: 14 /100 WBC
NRBC BLD-RTO: 14 /100 WBC
OVALOCYTES BLD QL SMEAR: ABNORMAL
OVALOCYTES BLD QL SMEAR: ABNORMAL
PHOSPHATE SERPL-MCNC: 3.3 MG/DL (ref 2.7–4.5)
PLATELET # BLD AUTO: 190 K/UL (ref 150–450)
PLATELET # BLD AUTO: 190 K/UL (ref 150–450)
PLATELET BLD QL SMEAR: ABNORMAL
PLATELET BLD QL SMEAR: ABNORMAL
PMV BLD AUTO: 10.9 FL (ref 9.2–12.9)
PMV BLD AUTO: 10.9 FL (ref 9.2–12.9)
POIKILOCYTOSIS BLD QL SMEAR: ABNORMAL
POIKILOCYTOSIS BLD QL SMEAR: ABNORMAL
POLYCHROMASIA BLD QL SMEAR: ABNORMAL
POLYCHROMASIA BLD QL SMEAR: ABNORMAL
POTASSIUM SERPL-SCNC: 4.2 MMOL/L (ref 3.5–5.1)
POTASSIUM SERPL-SCNC: 4.2 MMOL/L (ref 3.5–5.1)
PROT SERPL-MCNC: 7.6 G/DL (ref 6–8.4)
PROT SERPL-MCNC: 7.6 G/DL (ref 6–8.4)
RBC # BLD AUTO: 2.78 M/UL (ref 4.6–6.2)
RBC # BLD AUTO: 2.78 M/UL (ref 4.6–6.2)
SODIUM SERPL-SCNC: 139 MMOL/L (ref 136–145)
SODIUM SERPL-SCNC: 139 MMOL/L (ref 136–145)
SPECIMEN OUTDATE: NORMAL
URATE SERPL-MCNC: 2 MG/DL (ref 3.4–7)
WBC # BLD AUTO: 1.34 K/UL (ref 3.9–12.7)
WBC # BLD AUTO: 1.34 K/UL (ref 3.9–12.7)

## 2023-07-17 PROCEDURE — 86900 BLOOD TYPING SEROLOGIC ABO: CPT | Performed by: INTERNAL MEDICINE

## 2023-07-17 PROCEDURE — 85025 COMPLETE CBC W/AUTO DIFF WBC: CPT | Performed by: INTERNAL MEDICINE

## 2023-07-17 PROCEDURE — 36415 COLL VENOUS BLD VENIPUNCTURE: CPT | Performed by: INTERNAL MEDICINE

## 2023-07-17 PROCEDURE — 80053 COMPREHEN METABOLIC PANEL: CPT | Performed by: INTERNAL MEDICINE

## 2023-07-17 PROCEDURE — 84100 ASSAY OF PHOSPHORUS: CPT | Performed by: INTERNAL MEDICINE

## 2023-07-17 PROCEDURE — 84550 ASSAY OF BLOOD/URIC ACID: CPT | Performed by: INTERNAL MEDICINE

## 2023-07-17 PROCEDURE — 83735 ASSAY OF MAGNESIUM: CPT | Performed by: INTERNAL MEDICINE

## 2023-07-17 PROCEDURE — 83615 LACTATE (LD) (LDH) ENZYME: CPT | Performed by: INTERNAL MEDICINE

## 2023-07-17 NOTE — TELEPHONE ENCOUNTER
"  ----- Message -----  From: Sylvie Robbins  Sent: 7/17/2023  11:58 AM CDT  To: [facility] Staff    Regarding: Pt advice  Contact: Pt's daughter     Pt's daughter requesting call in regards to bone marrow sample that needs to be given.   Please call and adv @     Confirmed contact below:   Contact Name: Jennifer Perry  Phone Number: [phone]               Additional Notes:  "Thank you for all that you do for our patients"                                               "

## 2023-07-17 NOTE — TELEPHONE ENCOUNTER
----- Message from Harry Diamond MD sent at 7/17/2023 10:53 AM CDT -----  Can you call. I saw the count so you can say received.    Thanks!  ----- Message -----  From: Jessenia Durant  Sent: 7/17/2023  10:42 AM CDT  To: [facility] Staff    Type:  Needs Medical Advice    Who Called:  Lily from [facility]    Would the patient rather a call back or a response via [facility]?  call  Best Call Back Number:  [phone]  Additional Information:  WBC critical low. Lily would like a call back.

## 2023-07-20 ENCOUNTER — TELEPHONE (OUTPATIENT)
Dept: FAMILY MEDICINE | Facility: HOSPITAL | Age: 68
End: 2023-07-20
Payer: MEDICARE

## 2023-07-20 ENCOUNTER — LAB VISIT (OUTPATIENT)
Dept: LAB | Facility: HOSPITAL | Age: 68
End: 2023-07-20
Attending: INTERNAL MEDICINE
Payer: MEDICARE

## 2023-07-20 ENCOUNTER — DOCUMENTATION ONLY (OUTPATIENT)
Dept: HEMATOLOGY/ONCOLOGY | Facility: CLINIC | Age: 68
End: 2023-07-20
Payer: MEDICARE

## 2023-07-20 DIAGNOSIS — D46.9 MYELODYSPLASTIC SYNDROME: ICD-10-CM

## 2023-07-20 LAB
ABO + RH BLD: NORMAL
ALBUMIN SERPL BCP-MCNC: 3.8 G/DL (ref 3.5–5.2)
ALP SERPL-CCNC: 88 U/L (ref 55–135)
ALT SERPL W/O P-5'-P-CCNC: 16 U/L (ref 10–44)
ANION GAP SERPL CALC-SCNC: 15 MMOL/L (ref 8–16)
ANISOCYTOSIS BLD QL SMEAR: SLIGHT
ANISOCYTOSIS BLD QL SMEAR: SLIGHT
AST SERPL-CCNC: 19 U/L (ref 10–40)
BASOPHILS # BLD AUTO: 0.02 K/UL (ref 0–0.2)
BASOPHILS # BLD AUTO: 0.02 K/UL (ref 0–0.2)
BASOPHILS NFR BLD: 1.1 % (ref 0–1.9)
BASOPHILS NFR BLD: 1.1 % (ref 0–1.9)
BILIRUB SERPL-MCNC: 0.3 MG/DL (ref 0.1–1)
BLD GP AB SCN CELLS X3 SERPL QL: NORMAL
BUN SERPL-MCNC: 13 MG/DL (ref 8–23)
CALCIUM SERPL-MCNC: 9.2 MG/DL (ref 8.7–10.5)
CHLORIDE SERPL-SCNC: 105 MMOL/L (ref 95–110)
CO2 SERPL-SCNC: 20 MMOL/L (ref 23–29)
CREAT SERPL-MCNC: 1.1 MG/DL (ref 0.5–1.4)
DIFFERENTIAL METHOD: ABNORMAL
DIFFERENTIAL METHOD: ABNORMAL
EOSINOPHIL # BLD AUTO: 0 K/UL (ref 0–0.5)
EOSINOPHIL # BLD AUTO: 0 K/UL (ref 0–0.5)
EOSINOPHIL NFR BLD: 0 % (ref 0–8)
EOSINOPHIL NFR BLD: 0 % (ref 0–8)
ERYTHROCYTE [DISTWIDTH] IN BLOOD BY AUTOMATED COUNT: 18.7 % (ref 11.5–14.5)
ERYTHROCYTE [DISTWIDTH] IN BLOOD BY AUTOMATED COUNT: 18.7 % (ref 11.5–14.5)
EST. GFR  (NO RACE VARIABLE): >60 ML/MIN/1.73 M^2
GLUCOSE SERPL-MCNC: 124 MG/DL (ref 70–110)
HCT VFR BLD AUTO: 25.2 % (ref 40–54)
HCT VFR BLD AUTO: 25.2 % (ref 40–54)
HGB BLD-MCNC: 8.1 G/DL (ref 14–18)
HGB BLD-MCNC: 8.1 G/DL (ref 14–18)
HYPOCHROMIA BLD QL SMEAR: ABNORMAL
HYPOCHROMIA BLD QL SMEAR: ABNORMAL
IMM GRANULOCYTES # BLD AUTO: 0.02 K/UL (ref 0–0.04)
IMM GRANULOCYTES # BLD AUTO: 0.02 K/UL (ref 0–0.04)
IMM GRANULOCYTES NFR BLD AUTO: 1.1 % (ref 0–0.5)
IMM GRANULOCYTES NFR BLD AUTO: 1.1 % (ref 0–0.5)
LYMPHOCYTES # BLD AUTO: 1.1 K/UL (ref 1–4.8)
LYMPHOCYTES # BLD AUTO: 1.1 K/UL (ref 1–4.8)
LYMPHOCYTES NFR BLD: 59.4 % (ref 18–48)
LYMPHOCYTES NFR BLD: 59.4 % (ref 18–48)
MAGNESIUM SERPL-MCNC: 2.1 MG/DL (ref 1.6–2.6)
MCH RBC QN AUTO: 30.1 PG (ref 27–31)
MCH RBC QN AUTO: 30.1 PG (ref 27–31)
MCHC RBC AUTO-ENTMCNC: 32.1 G/DL (ref 32–36)
MCHC RBC AUTO-ENTMCNC: 32.1 G/DL (ref 32–36)
MCV RBC AUTO: 94 FL (ref 82–98)
MCV RBC AUTO: 94 FL (ref 82–98)
MONOCYTES # BLD AUTO: 0.4 K/UL (ref 0.3–1)
MONOCYTES # BLD AUTO: 0.4 K/UL (ref 0.3–1)
MONOCYTES NFR BLD: 21.7 % (ref 4–15)
MONOCYTES NFR BLD: 21.7 % (ref 4–15)
NEUTROPHILS # BLD AUTO: 0.3 K/UL (ref 1.8–7.7)
NEUTROPHILS # BLD AUTO: 0.3 K/UL (ref 1.8–7.7)
NEUTROPHILS NFR BLD: 16.7 % (ref 38–73)
NEUTROPHILS NFR BLD: 16.7 % (ref 38–73)
NRBC BLD-RTO: 29 /100 WBC
NRBC BLD-RTO: 29 /100 WBC
PHOSPHATE SERPL-MCNC: 4 MG/DL (ref 2.7–4.5)
PLATELET # BLD AUTO: 170 K/UL (ref 150–450)
PLATELET # BLD AUTO: 170 K/UL (ref 150–450)
PLATELET BLD QL SMEAR: ABNORMAL
PLATELET BLD QL SMEAR: ABNORMAL
PMV BLD AUTO: 10.6 FL (ref 9.2–12.9)
PMV BLD AUTO: 10.6 FL (ref 9.2–12.9)
POIKILOCYTOSIS BLD QL SMEAR: SLIGHT
POIKILOCYTOSIS BLD QL SMEAR: SLIGHT
POLYCHROMASIA BLD QL SMEAR: ABNORMAL
POLYCHROMASIA BLD QL SMEAR: ABNORMAL
POTASSIUM SERPL-SCNC: 3.9 MMOL/L (ref 3.5–5.1)
PROT SERPL-MCNC: 7.2 G/DL (ref 6–8.4)
RBC # BLD AUTO: 2.69 M/UL (ref 4.6–6.2)
RBC # BLD AUTO: 2.69 M/UL (ref 4.6–6.2)
SODIUM SERPL-SCNC: 140 MMOL/L (ref 136–145)
SPECIMEN OUTDATE: NORMAL
WBC # BLD AUTO: 1.8 K/UL (ref 3.9–12.7)
WBC # BLD AUTO: 1.8 K/UL (ref 3.9–12.7)

## 2023-07-20 PROCEDURE — 86900 BLOOD TYPING SEROLOGIC ABO: CPT | Performed by: INTERNAL MEDICINE

## 2023-07-20 PROCEDURE — 80053 COMPREHEN METABOLIC PANEL: CPT | Performed by: INTERNAL MEDICINE

## 2023-07-20 PROCEDURE — 83735 ASSAY OF MAGNESIUM: CPT | Performed by: INTERNAL MEDICINE

## 2023-07-20 PROCEDURE — 84100 ASSAY OF PHOSPHORUS: CPT | Performed by: INTERNAL MEDICINE

## 2023-07-20 PROCEDURE — 36415 COLL VENOUS BLD VENIPUNCTURE: CPT | Performed by: INTERNAL MEDICINE

## 2023-07-20 PROCEDURE — 85025 COMPLETE CBC W/AUTO DIFF WBC: CPT | Performed by: INTERNAL MEDICINE

## 2023-07-20 NOTE — TELEPHONE ENCOUNTER
Lily from [facility] called with a Critical lab value of WBC-1.80. High Priority message sent to Dr. Harry Diamond and staff. Value entered into Flowsheets.

## 2023-07-24 ENCOUNTER — LAB VISIT (OUTPATIENT)
Dept: LAB | Facility: HOSPITAL | Age: 68
End: 2023-07-24
Attending: INTERNAL MEDICINE
Payer: MEDICARE

## 2023-07-24 DIAGNOSIS — D46.9 MYELODYSPLASTIC SYNDROME: ICD-10-CM

## 2023-07-24 LAB
ABO + RH BLD: NORMAL
ALBUMIN SERPL BCP-MCNC: 3.8 G/DL (ref 3.5–5.2)
ALBUMIN SERPL BCP-MCNC: 3.8 G/DL (ref 3.5–5.2)
ALP SERPL-CCNC: 82 U/L (ref 55–135)
ALP SERPL-CCNC: 82 U/L (ref 55–135)
ALT SERPL W/O P-5'-P-CCNC: 18 U/L (ref 10–44)
ALT SERPL W/O P-5'-P-CCNC: 18 U/L (ref 10–44)
ANION GAP SERPL CALC-SCNC: 8 MMOL/L (ref 8–16)
ANION GAP SERPL CALC-SCNC: 8 MMOL/L (ref 8–16)
ANISOCYTOSIS BLD QL SMEAR: SLIGHT
ANISOCYTOSIS BLD QL SMEAR: SLIGHT
AST SERPL-CCNC: 18 U/L (ref 10–40)
AST SERPL-CCNC: 18 U/L (ref 10–40)
BASO STIPL BLD QL SMEAR: ABNORMAL
BASO STIPL BLD QL SMEAR: ABNORMAL
BASOPHILS # BLD AUTO: 0.03 K/UL (ref 0–0.2)
BASOPHILS # BLD AUTO: 0.03 K/UL (ref 0–0.2)
BASOPHILS NFR BLD: 1.4 % (ref 0–1.9)
BASOPHILS NFR BLD: 1.4 % (ref 0–1.9)
BILIRUB SERPL-MCNC: 0.4 MG/DL (ref 0.1–1)
BILIRUB SERPL-MCNC: 0.4 MG/DL (ref 0.1–1)
BLD GP AB SCN CELLS X3 SERPL QL: NORMAL
BUN SERPL-MCNC: 13 MG/DL (ref 8–23)
BUN SERPL-MCNC: 13 MG/DL (ref 8–23)
CALCIUM SERPL-MCNC: 9.5 MG/DL (ref 8.7–10.5)
CALCIUM SERPL-MCNC: 9.5 MG/DL (ref 8.7–10.5)
CHLORIDE SERPL-SCNC: 107 MMOL/L (ref 95–110)
CHLORIDE SERPL-SCNC: 107 MMOL/L (ref 95–110)
CO2 SERPL-SCNC: 25 MMOL/L (ref 23–29)
CO2 SERPL-SCNC: 25 MMOL/L (ref 23–29)
CREAT SERPL-MCNC: 1 MG/DL (ref 0.5–1.4)
CREAT SERPL-MCNC: 1 MG/DL (ref 0.5–1.4)
DACRYOCYTES BLD QL SMEAR: ABNORMAL
DACRYOCYTES BLD QL SMEAR: ABNORMAL
DIFFERENTIAL METHOD: ABNORMAL
DIFFERENTIAL METHOD: ABNORMAL
EOSINOPHIL # BLD AUTO: 0 K/UL (ref 0–0.5)
EOSINOPHIL # BLD AUTO: 0 K/UL (ref 0–0.5)
EOSINOPHIL NFR BLD: 0.9 % (ref 0–8)
EOSINOPHIL NFR BLD: 0.9 % (ref 0–8)
ERYTHROCYTE [DISTWIDTH] IN BLOOD BY AUTOMATED COUNT: 25.5 % (ref 11.5–14.5)
ERYTHROCYTE [DISTWIDTH] IN BLOOD BY AUTOMATED COUNT: 25.5 % (ref 11.5–14.5)
EST. GFR  (NO RACE VARIABLE): >60 ML/MIN/1.73 M^2
EST. GFR  (NO RACE VARIABLE): >60 ML/MIN/1.73 M^2
GIANT PLATELETS BLD QL SMEAR: PRESENT
GIANT PLATELETS BLD QL SMEAR: PRESENT
GLUCOSE SERPL-MCNC: 92 MG/DL (ref 70–110)
GLUCOSE SERPL-MCNC: 92 MG/DL (ref 70–110)
HCT VFR BLD AUTO: 29 % (ref 40–54)
HCT VFR BLD AUTO: 29 % (ref 40–54)
HGB BLD-MCNC: 9 G/DL (ref 14–18)
HGB BLD-MCNC: 9 G/DL (ref 14–18)
HYPOCHROMIA BLD QL SMEAR: ABNORMAL
HYPOCHROMIA BLD QL SMEAR: ABNORMAL
IMM GRANULOCYTES # BLD AUTO: 0 K/UL (ref 0–0.04)
IMM GRANULOCYTES # BLD AUTO: 0 K/UL (ref 0–0.04)
IMM GRANULOCYTES NFR BLD AUTO: 0 % (ref 0–0.5)
IMM GRANULOCYTES NFR BLD AUTO: 0 % (ref 0–0.5)
LDH SERPL L TO P-CCNC: 167 U/L (ref 110–260)
LYMPHOCYTES # BLD AUTO: 1 K/UL (ref 1–4.8)
LYMPHOCYTES # BLD AUTO: 1 K/UL (ref 1–4.8)
LYMPHOCYTES NFR BLD: 46.8 % (ref 18–48)
LYMPHOCYTES NFR BLD: 46.8 % (ref 18–48)
MAGNESIUM SERPL-MCNC: 2.4 MG/DL (ref 1.6–2.6)
MCH RBC QN AUTO: 30.3 PG (ref 27–31)
MCH RBC QN AUTO: 30.3 PG (ref 27–31)
MCHC RBC AUTO-ENTMCNC: 31 G/DL (ref 32–36)
MCHC RBC AUTO-ENTMCNC: 31 G/DL (ref 32–36)
MCV RBC AUTO: 98 FL (ref 82–98)
MCV RBC AUTO: 98 FL (ref 82–98)
MONOCYTES # BLD AUTO: 0.2 K/UL (ref 0.3–1)
MONOCYTES # BLD AUTO: 0.2 K/UL (ref 0.3–1)
MONOCYTES NFR BLD: 10.9 % (ref 4–15)
MONOCYTES NFR BLD: 10.9 % (ref 4–15)
NEUTROPHILS # BLD AUTO: 0.9 K/UL (ref 1.8–7.7)
NEUTROPHILS # BLD AUTO: 0.9 K/UL (ref 1.8–7.7)
NEUTROPHILS NFR BLD: 40 % (ref 38–73)
NEUTROPHILS NFR BLD: 40 % (ref 38–73)
NRBC BLD-RTO: 4 /100 WBC
NRBC BLD-RTO: 4 /100 WBC
PHOSPHATE SERPL-MCNC: 4.6 MG/DL (ref 2.7–4.5)
PLATELET # BLD AUTO: 153 K/UL (ref 150–450)
PLATELET # BLD AUTO: 153 K/UL (ref 150–450)
PLATELET BLD QL SMEAR: ABNORMAL
PLATELET BLD QL SMEAR: ABNORMAL
PMV BLD AUTO: 10.5 FL (ref 9.2–12.9)
PMV BLD AUTO: 10.5 FL (ref 9.2–12.9)
POIKILOCYTOSIS BLD QL SMEAR: SLIGHT
POIKILOCYTOSIS BLD QL SMEAR: SLIGHT
POLYCHROMASIA BLD QL SMEAR: ABNORMAL
POLYCHROMASIA BLD QL SMEAR: ABNORMAL
POTASSIUM SERPL-SCNC: 4.4 MMOL/L (ref 3.5–5.1)
POTASSIUM SERPL-SCNC: 4.4 MMOL/L (ref 3.5–5.1)
PROT SERPL-MCNC: 7.2 G/DL (ref 6–8.4)
PROT SERPL-MCNC: 7.2 G/DL (ref 6–8.4)
RBC # BLD AUTO: 2.97 M/UL (ref 4.6–6.2)
RBC # BLD AUTO: 2.97 M/UL (ref 4.6–6.2)
SCHISTOCYTES BLD QL SMEAR: ABNORMAL
SCHISTOCYTES BLD QL SMEAR: ABNORMAL
SCHISTOCYTES BLD QL SMEAR: PRESENT
SCHISTOCYTES BLD QL SMEAR: PRESENT
SODIUM SERPL-SCNC: 140 MMOL/L (ref 136–145)
SODIUM SERPL-SCNC: 140 MMOL/L (ref 136–145)
SPECIMEN OUTDATE: NORMAL
URATE SERPL-MCNC: 2.4 MG/DL (ref 3.4–7)
WBC # BLD AUTO: 2.2 K/UL (ref 3.9–12.7)
WBC # BLD AUTO: 2.2 K/UL (ref 3.9–12.7)

## 2023-07-24 PROCEDURE — 83615 LACTATE (LD) (LDH) ENZYME: CPT | Performed by: INTERNAL MEDICINE

## 2023-07-24 PROCEDURE — 80053 COMPREHEN METABOLIC PANEL: CPT | Performed by: INTERNAL MEDICINE

## 2023-07-24 PROCEDURE — 84550 ASSAY OF BLOOD/URIC ACID: CPT | Performed by: INTERNAL MEDICINE

## 2023-07-24 PROCEDURE — 86900 BLOOD TYPING SEROLOGIC ABO: CPT | Performed by: INTERNAL MEDICINE

## 2023-07-24 PROCEDURE — 85025 COMPLETE CBC W/AUTO DIFF WBC: CPT | Performed by: INTERNAL MEDICINE

## 2023-07-24 PROCEDURE — 83735 ASSAY OF MAGNESIUM: CPT | Performed by: INTERNAL MEDICINE

## 2023-07-24 PROCEDURE — 84100 ASSAY OF PHOSPHORUS: CPT | Performed by: INTERNAL MEDICINE

## 2023-07-24 NOTE — TELEPHONE ENCOUNTER
The signed and completed patient assistance application was received from the providers office and was submitted to [employer] for consideration of eligibility 06/30/23       [employer] Rep will be faxing over attestation form for signature

## 2023-07-27 ENCOUNTER — TELEPHONE (OUTPATIENT)
Dept: FAMILY MEDICINE | Facility: HOSPITAL | Age: 68
End: 2023-07-27
Payer: MEDICARE

## 2023-07-27 ENCOUNTER — LAB VISIT (OUTPATIENT)
Dept: LAB | Facility: HOSPITAL | Age: 68
End: 2023-07-27
Attending: INTERNAL MEDICINE
Payer: MEDICARE

## 2023-07-27 DIAGNOSIS — D46.9 MYELODYSPLASTIC SYNDROME: ICD-10-CM

## 2023-07-27 LAB
ABO + RH BLD: NORMAL
ALBUMIN SERPL BCP-MCNC: 3.7 G/DL (ref 3.5–5.2)
ALP SERPL-CCNC: 82 U/L (ref 55–135)
ALT SERPL W/O P-5'-P-CCNC: 20 U/L (ref 10–44)
ANION GAP SERPL CALC-SCNC: 9 MMOL/L (ref 8–16)
ANISOCYTOSIS BLD QL SMEAR: ABNORMAL
ANISOCYTOSIS BLD QL SMEAR: ABNORMAL
ANNOTATION COMMENT IMP: NORMAL
AST SERPL-CCNC: 18 U/L (ref 10–40)
BASOPHILS # BLD AUTO: ABNORMAL K/UL (ref 0–0.2)
BASOPHILS # BLD AUTO: ABNORMAL K/UL (ref 0–0.2)
BASOPHILS NFR BLD: 3 % (ref 0–1.9)
BASOPHILS NFR BLD: 3 % (ref 0–1.9)
BILIRUB SERPL-MCNC: 0.3 MG/DL (ref 0.1–1)
BLD GP AB SCN CELLS X3 SERPL QL: NORMAL
BUN SERPL-MCNC: 12 MG/DL (ref 8–23)
CALCIUM SERPL-MCNC: 9.2 MG/DL (ref 8.7–10.5)
CHLORIDE SERPL-SCNC: 107 MMOL/L (ref 95–110)
CO2 SERPL-SCNC: 24 MMOL/L (ref 23–29)
CREAT SERPL-MCNC: 1 MG/DL (ref 0.5–1.4)
DACRYOCYTES BLD QL SMEAR: ABNORMAL
DACRYOCYTES BLD QL SMEAR: ABNORMAL
DIFFERENTIAL METHOD: ABNORMAL
DIFFERENTIAL METHOD: ABNORMAL
EOSINOPHIL # BLD AUTO: ABNORMAL K/UL (ref 0–0.5)
EOSINOPHIL # BLD AUTO: ABNORMAL K/UL (ref 0–0.5)
EOSINOPHIL NFR BLD: 1 % (ref 0–8)
EOSINOPHIL NFR BLD: 1 % (ref 0–8)
ERYTHROCYTE [DISTWIDTH] IN BLOOD BY AUTOMATED COUNT: 26 % (ref 11.5–14.5)
ERYTHROCYTE [DISTWIDTH] IN BLOOD BY AUTOMATED COUNT: 26 % (ref 11.5–14.5)
EST. GFR  (NO RACE VARIABLE): >60 ML/MIN/1.73 M^2
GLUCOSE SERPL-MCNC: 108 MG/DL (ref 70–110)
HCT VFR BLD AUTO: 30.2 % (ref 40–54)
HCT VFR BLD AUTO: 30.2 % (ref 40–54)
HGB BLD-MCNC: 9.4 G/DL (ref 14–18)
HGB BLD-MCNC: 9.4 G/DL (ref 14–18)
IMM GRANULOCYTES # BLD AUTO: ABNORMAL K/UL (ref 0–0.04)
IMM GRANULOCYTES # BLD AUTO: ABNORMAL K/UL (ref 0–0.04)
IMM GRANULOCYTES NFR BLD AUTO: ABNORMAL % (ref 0–0.5)
IMM GRANULOCYTES NFR BLD AUTO: ABNORMAL % (ref 0–0.5)
LYMPHOCYTES # BLD AUTO: ABNORMAL K/UL (ref 1–4.8)
LYMPHOCYTES # BLD AUTO: ABNORMAL K/UL (ref 1–4.8)
LYMPHOCYTES NFR BLD: 63 % (ref 18–48)
LYMPHOCYTES NFR BLD: 63 % (ref 18–48)
MAGNESIUM SERPL-MCNC: 2.2 MG/DL (ref 1.6–2.6)
MCH RBC QN AUTO: 30.6 PG (ref 27–31)
MCH RBC QN AUTO: 30.6 PG (ref 27–31)
MCHC RBC AUTO-ENTMCNC: 31.1 G/DL (ref 32–36)
MCHC RBC AUTO-ENTMCNC: 31.1 G/DL (ref 32–36)
MCV RBC AUTO: 98 FL (ref 82–98)
MCV RBC AUTO: 98 FL (ref 82–98)
MONOCYTES # BLD AUTO: ABNORMAL K/UL (ref 0.3–1)
MONOCYTES # BLD AUTO: ABNORMAL K/UL (ref 0.3–1)
MONOCYTES NFR BLD: 2 % (ref 4–15)
MONOCYTES NFR BLD: 2 % (ref 4–15)
NEUTROPHILS NFR BLD: 29 % (ref 38–73)
NEUTROPHILS NFR BLD: 29 % (ref 38–73)
NEUTS BAND NFR BLD MANUAL: 2 %
NEUTS BAND NFR BLD MANUAL: 2 %
NGS CLINCIAL TRIALS: NORMAL
NGS INDICATION OF TEST: NORMAL
NGS INTERPRETATION: NORMAL
NGS ONCOHEME PANEL GENE LIST: NORMAL
NGS PATHOGENIC MUTATIONS DETECTED: NORMAL
NGS REVIEWED BY:: NORMAL
NGS VARIANTS OF UNKNOWN SIGNIFICANCE: NORMAL
NGSHM RESULT, BONE MARROW: NORMAL
NRBC BLD-RTO: 2 /100 WBC
NRBC BLD-RTO: 2 /100 WBC
OVALOCYTES BLD QL SMEAR: ABNORMAL
OVALOCYTES BLD QL SMEAR: ABNORMAL
PHOSPHATE SERPL-MCNC: 3.2 MG/DL (ref 2.7–4.5)
PLATELET # BLD AUTO: 191 K/UL (ref 150–450)
PLATELET # BLD AUTO: 191 K/UL (ref 150–450)
PLATELET BLD QL SMEAR: ABNORMAL
PLATELET BLD QL SMEAR: ABNORMAL
PMV BLD AUTO: 10.4 FL (ref 9.2–12.9)
PMV BLD AUTO: 10.4 FL (ref 9.2–12.9)
POIKILOCYTOSIS BLD QL SMEAR: SLIGHT
POIKILOCYTOSIS BLD QL SMEAR: SLIGHT
POLYCHROMASIA BLD QL SMEAR: ABNORMAL
POLYCHROMASIA BLD QL SMEAR: ABNORMAL
POTASSIUM SERPL-SCNC: 4 MMOL/L (ref 3.5–5.1)
PROT SERPL-MCNC: 7.4 G/DL (ref 6–8.4)
RBC # BLD AUTO: 3.07 M/UL (ref 4.6–6.2)
RBC # BLD AUTO: 3.07 M/UL (ref 4.6–6.2)
REF LAB TEST METHOD: NORMAL
SODIUM SERPL-SCNC: 140 MMOL/L (ref 136–145)
SPECIMEN OUTDATE: NORMAL
SPECIMEN SOURCE: NORMAL
TEST PERFORMANCE INFO SPEC: NORMAL
WBC # BLD AUTO: 1.94 K/UL (ref 3.9–12.7)
WBC # BLD AUTO: 1.94 K/UL (ref 3.9–12.7)

## 2023-07-27 PROCEDURE — 83735 ASSAY OF MAGNESIUM: CPT | Performed by: INTERNAL MEDICINE

## 2023-07-27 PROCEDURE — 86900 BLOOD TYPING SEROLOGIC ABO: CPT | Performed by: INTERNAL MEDICINE

## 2023-07-27 PROCEDURE — 84100 ASSAY OF PHOSPHORUS: CPT | Performed by: INTERNAL MEDICINE

## 2023-07-27 PROCEDURE — 85027 COMPLETE CBC AUTOMATED: CPT | Performed by: INTERNAL MEDICINE

## 2023-07-27 PROCEDURE — 36415 COLL VENOUS BLD VENIPUNCTURE: CPT | Performed by: INTERNAL MEDICINE

## 2023-07-27 PROCEDURE — 80053 COMPREHEN METABOLIC PANEL: CPT | Performed by: INTERNAL MEDICINE

## 2023-07-27 PROCEDURE — 85007 BL SMEAR W/DIFF WBC COUNT: CPT | Performed by: INTERNAL MEDICINE

## 2023-07-27 NOTE — PROGRESS NOTES
PATIENT: Guillaume Salinas  MRN: [id number]  DATE: 7/28/2023    Diagnosis:   1. Myelodysplastic syndrome    2. 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 Anemia due to antineoplastic chemotherapy    5. Chemotherapy-induced thrombocytopenia    6. Chemotherapy-induced neutropenia      Chief Complaint: myelodysplastic syndrome    Oncologic History:      Oncologic History Myelodysplastic syndrome      Oncologic Treatment Azacitidine/venetoclax (start date 6/12/23).      Pathology 7/3/23:  BONE MARROW ASPIRATE, TOUCH PREP, CLOT, AND DECALCIFIED NEEDLE CORE BIOPSY:   LEFT POSTEROSUPERIOR ILIAC CREST   - MORPHOLOGIC AND IMMUNOPHENOTYPIC FEATURES OF PERSISTENT MDS   - LIMITED, SUBOPTIMAL SPECIMEN: Findings may not be entirely representative   - Hypocellular marrow (20% total cellularity) with no increased CD34+ blasts, chemotherapeutic changes, and scant residual trilineage hypoplasia and dyspoiesis with increased numbers of micromegakaryocytes   - Relative lymphocytosis including small, well-defined lymphoid aggregates (favor benign/reactive)   - Relative polytypic plasmacytosis (5-10%) with morphologically unremarkable plasma cells   - Mildly increased stainable histiocytic iron stores (4+ out of 6+)     5/23/23:  LEFT ILIAC CREST BONE MARROW ASPIRATE, BONE MARROW CLOT, AND BONE MARROW CORE BIOPSY WITH:     CELLULARITY= 90-95%, MYELOID PREDOMINANCE (M/E= 7:1).   CONSISTENT WITH MYELODYSPLASTIC SYNDROME WITH EXCESS BLASTS-2 (16-17%).  SEE COMMENT.   ADEQUATE STORAGE IRON.   INCREASED NUMBER OF MEGAKARYOCYTES WITH DYSPLASTIC MORPHOLOGY.       4/26/23:  Bone marrow, left iliac crest, aspirate, clot, and core biopsy:   --Hypercellular marrow for age, 80-90% with trilineage maturation showing increased blasts and small megakaryocytic forms, most compatible with a primary marrow neoplasm, favor myelodysplasia with excess blasts (MDS-EB-2) with impending evolution, final    classification pending correlation with cytogenetic and molecular studies, see comment   --Stainable iron is not increased   --Mild reticulin fibrosis          Bone marrow, left iliac crest, aspirate, clot, and core biopsy:   --Hypercellular marrow for age, 80-90% with trilineage maturation showing increased blasts and small megakaryocytic forms, most compatible with a primary marrow neoplasm, favor myelodysplasia with excess blasts (MDS-EB-2) with impending evolution, final   classification pending correlation with cytogenetic and molecular studies, see comment   --Stainable iron is not increased   --Mild reticulin fibrosis     Comment:  Concomitantly submitted flow cytometric analysis detects a mildly increased myeloblast population, concerning for a primary marrow process.  The blast gate is increased with approximately 8% CD38/CD34 positive blasts identified.  Populations of    B lymphocytes are polyclonal and T lymphocytes are immunophenotypically unremarkable.     This study is somewhat limited by lack of cellularity on aspirate smears with mild reticulin fibrosis noted.  However, there are increased CD34 positive blasts, counted at 12% on the immunohistochemical stain with increased megakaryocytes showing many micromegakaryocytic forms.  The morphology in conjunction with peripheral cytopenias is compatible with a primary marrow neoplasm, highly suggestive of myelodysplasia with excess blasts (MDS-EB-2) although an evolving acute leukemia is of concern.     Correlation with clinical findings and any available cytogenetic and molecular studies is required for further characterization.              Subjective:    History of Present Illness: Mr. Betsy Salinas is a 67 y.o. male who presented in April 2023 for evaluation and management of anemia.    [I do not see evidence of anemia in his labs, but he was referred for anemia workup.]    - he went to [place] for a dental procedure. He had several teeth  "removed ("an intense procedure per his wife"). He had taken antibiotics/amoxicillin and ibuprofen. He had the second part of procedure about a week later. He noted severe fatigue, weakness.  - he underwent bone marrow biopsy on 4/26/23.  - he met with Dr. Hickey on 5/8/23. He recommended repeat bone marrow biopsy.  - he underwent bone marrow biopsy on 5/23/23.  - he met with Dr. Hickey on 5/30/23. 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 CG/FISH/NGS pending. I reviewed with him and his family the aggressive nature of this disease, including natural history, prognosis, and treatment options. I recommended treatment with azacitidine 75 mg/m2 daily x7 days plus venetoclax 100mg (posa) daily x21 of 28 days. He was in agreement. Consent signed today.   Azacitidine plus venetoclax x21 of 28 days as above. Taz ramp up ordered.  Repeat bone marrow biopsy at the end of cycle 1. Orders placed.     Stem cell transplant candidate: We briefly discussed allogeneic stem cell transplantation in MDS. He will need transplant in CR1. Will plan for further discussion of stem cell transplant and meeting with transplant coordinators at next follow up in 2-3 weeks.   Will send HLA typing with next labs."    - he underwent bone marrow biopsy on 7/3/23    - he met with Dr. Hickey on 6/20/23. Information per note:  "HCT-CI of 1 (intermediate risk). Will plan to proceed with transplant in the next 2-3 months (as soon as a donor is identified). HLA typing sent."    Interval history:  - he presents for a follow-up appointment for his myelodysplastic syndrome.  - he saw Dr. Hickey on 7/11/23. Per his note: "will plan to proceed with transplant in the next 2-3 months (as soon as a donor is identified)."    - he is scheduled to start cycle #3 of azacitidine/venetoclax on 8/7/23.   - he endorses nausea from the venetoclax/azacitidine. He endorses fatigue, poor appetite. He denies shortness of " breath, chest pain, diarrhea, constipation.        Past medical, surgical, family, and social histories have been reviewed and updated below.    Past Medical History:   Past Medical History:   Diagnosis Date    Anticoagulant long-term use     Coronary artery disease     Hypertension     Peripheral vascular disease, unspecified        Past Surgical History:   Past Surgical History:   Procedure Laterality Date    BONE MARROW BIOPSY Left 4/26/2023    Procedure: Biopsy-bone marrow;  Surgeon: Harry Diamond MD;  Location: [facility] OR;  Service: Oncology;  Laterality: Left;    COLONOSCOPY N/A 01/05/2022    Procedure: COLONOSCOPY Golytely Vaccinated will bring cards;  Surgeon: Dereje Simon MD;  Location: [facility] ENDO;  Service: Endoscopy;  Laterality: N/A;  Do not cancel this order       Family History:   Family History   Problem Relation Age of Onset    Hypertension Mother     Cancer Father     Cancer Brother     No Known Problems Daughter     No Known Problems Daughter     No Known Problems Son        Social History:  reports that he quit smoking about 4 months ago. His smoking use included cigarettes. He has a 12.50 pack-year smoking history. He has never used smokeless tobacco. He reports that he does not currently use alcohol. He reports that he does not use drugs.    Allergies:  Review of patient's allergies indicates:  No Known Allergies    Medications:  Current Outpatient Medications   Medication Sig Dispense Refill    acyclovir (ZOVIRAX) 400 MG tablet Take 1 tablet (400 mg total) by mouth 2 (two) times daily. 60 tablet 11    allopurinoL (ZYLOPRIM) 300 MG tablet Take 1 tablet (300 mg total) by mouth 2 (two) times daily. 60 tablet 11    aspirin (ECOTRIN) 81 MG EC tablet Take 1 tablet (81 mg total) by mouth once daily. 30 tablet 12    atorvastatin (LIPITOR) 80 MG tablet Take 1 tablet (80 mg total) by mouth once daily. (Patient not taking: Reported on 6/29/2023) 90 tablet 3    carvediloL (COREG) 6.25 MG tablet  TAKE 1 TABLET(6.25 MG) BY MOUTH TWICE DAILY WITH MEALS 180 tablet 3    cilostazoL (PLETAL) 50 MG Tab Take 1 tablet (50 mg total) by mouth 2 (two) times daily. 180 tablet 3    docusate sodium (COLACE) 100 MG capsule Take 100 mg by mouth 2 (two) times daily as needed for Constipation.      gabapentin (NEURONTIN) 300 MG capsule Take 1 capsule (300 mg total) by mouth 3 (three) times daily. 90 capsule 11    levoFLOXacin (LEVAQUIN) 500 MG tablet Take 1 tablet (500 mg total) by mouth once daily. 30 tablet 11    promethazine (PHENERGAN) 25 MG tablet Take 1 tablet (25 mg total) by mouth every 8 (eight) hours as needed for Nausea. 40 tablet 5    venetoclax (VENCLEXTA) 100 mg Tab Take 1 tablet (100 mg) by mouth once daily on days 3-14 of a 28-day cycle (FOR CYCLE 1 ONLY). Do not discard any remaining tablets. 28 tablet 0    venetoclax (VENCLEXTA) 100 mg Tab Take 1 tablet (100 mg) by mouth once daily on days 1-14 of a 28-day cycle. Do not discard any remaining tablets. 28 tablet 11    voriconazole (VFEND) 200 MG Tab Take 1 tablet (200 mg total) by mouth 2 (two) times daily. 60 tablet 11    zolpidem (AMBIEN) 10 mg Tab Take 10 mg by mouth nightly as needed.       No current facility-administered medications for this visit.       Review of Systems   Constitutional:  Positive for fatigue.   HENT:  Negative for sore throat.    Eyes:  Negative for visual disturbance.   Respiratory:  Negative for shortness of breath.    Cardiovascular:  Negative for chest pain.   Gastrointestinal:  Positive for nausea. Negative for abdominal pain.   Genitourinary:  Negative for dysuria.   Musculoskeletal:  Negative for back pain.   Skin:  Negative for rash.   Neurological:  Negative for weakness and headaches.   Hematological:  Negative for adenopathy.   Psychiatric/Behavioral:  The patient is not nervous/anxious.      ECOG Performance Status:   ECOG SCORE    1 - Restricted in strenuous activity-ambulatory and able to carry out work of a light  nature         Objective:      Vitals:   Vitals:    07/28/23 0806   BP: 121/64   Pulse: 83   SpO2: 96%   Weight: 72.7 kg (160 lb 4.4 oz)     BMI: Body mass index is 23.67 kg/m².    Physical Exam  Vitals and nursing note reviewed.   Constitutional:       Appearance: He is well-developed.   HENT:      Head: Normocephalic and atraumatic.   Eyes:      Pupils: Pupils are equal, round, and reactive to light.   Cardiovascular:      Rate and Rhythm: Normal rate and regular rhythm.   Pulmonary:      Effort: Pulmonary effort is normal.      Breath sounds: Normal breath sounds.   Abdominal:      General: Bowel sounds are normal.      Palpations: Abdomen is soft.   Musculoskeletal:         General: Normal range of motion.      Cervical back: Normal range of motion and neck supple.   Skin:     General: Skin is warm and dry.   Neurological:      Mental Status: He is alert and oriented to person, place, and time.   Psychiatric:         Behavior: Behavior normal.         Thought Content: Thought content normal.         Judgment: Judgment normal.       Laboratory Data:  Labs have been reviewed.    Lab Results   Component Value Date    WBC 1.94 (LL) 07/27/2023    WBC 1.94 (LL) 07/27/2023    HGB 9.4 (L) 07/27/2023    HGB 9.4 (L) 07/27/2023    HCT 30.2 (L) 07/27/2023    HCT 30.2 (L) 07/27/2023    MCV 98 07/27/2023    MCV 98 07/27/2023     07/27/2023     07/27/2023       Imaging:        Assessment:       1. Myelodysplastic syndrome    2. 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 Anemia due to antineoplastic chemotherapy    5. Chemotherapy-induced neutropenia           Plan:     Myelodysplastic syndrome / anemia due to chemo / neutropenia due to chemo.  - bone marrow biopsy (4/26/23) revealed myelodysplastic syndrome.  - he met with Dr. Hickey on 5/8/23. He recommended repeat bone marrow biopsy.  - bone marrow biopsy (5/23/23) revealed myelodysplastic syndrome with excess  "blasts.  - he met with Dr. Hickey on 5/30/23. He recommended azacitidine (7-day) with venetoclax.  - he has received several blood transfusions in June 2023  - he met with Dr. Hickey on 6/20/23. Information per note:  "HCT-CI of 1 (intermediate risk). Will plan to proceed with transplant in the next 2-3 months (as soon as a donor is identified). HLA typing sent."  - bone marrow biopsy (7/3/23) revealed no increased blasts! He will follow up with Dr. Hickey on 7/11/23  - he has received blood/platelets on 7/5/23.  - he has not received blood/platelet transfusion in several weeks.  - will decrease frequency of labs to once weekly.  - check labs on 8/3 in preparation for cycle #3 of azacitidine, scheduled to start on week of 8/7/23.  - continue anti-infectious therapy per Dr. Hickey's plan.  - return to clinic in one month.    2. Chemotherapy-induced nausea/vomiting  - mild symptoms. Continue phenergan as needed.    3. Advance Care Planning     Power of   After our discussion (at previous visit), the patient has decided not to complete a HCPOA.       - return to clinic in one month.    Harry Diamond M.D.  Hematology/Oncology  [facility] - [address], [address]  [place] [address]  Phone: [phone]  Fax: [phone]  "

## 2023-07-27 NOTE — TELEPHONE ENCOUNTER
Lucila from [facility] called with a critical lab value of WBC-1.94. High Priority message sent to Dr. Harry Diamond and staff. Value entered into flowsheets.

## 2023-07-28 ENCOUNTER — OFFICE VISIT (OUTPATIENT)
Dept: HEMATOLOGY/ONCOLOGY | Facility: CLINIC | Age: 68
End: 2023-07-28
Payer: MEDICARE

## 2023-07-28 ENCOUNTER — PATIENT MESSAGE (OUTPATIENT)
Dept: CARDIOLOGY | Facility: CLINIC | Age: 68
End: 2023-07-28
Payer: MEDICARE

## 2023-07-28 VITALS
DIASTOLIC BLOOD PRESSURE: 64 MMHG | WEIGHT: 160.25 LBS | BODY MASS INDEX: 23.67 KG/M2 | HEART RATE: 83 BPM | OXYGEN SATURATION: 96 % | SYSTOLIC BLOOD PRESSURE: 121 MMHG

## 2023-07-28 DIAGNOSIS — D46.9 MYELODYSPLASTIC SYNDROME: Primary | ICD-10-CM

## 2023-07-28 DIAGNOSIS — D70.1 CHEMOTHERAPY-INDUCED NEUTROPENIA: ICD-10-CM

## 2023-07-28 DIAGNOSIS — D64.81 ANEMIA DUE TO ANTINEOPLASTIC CHEMOTHERAPY: ICD-10-CM

## 2023-07-28 DIAGNOSIS — Z79.899 IMMUNODEFICIENCY DUE TO DRUG THERAPY: ICD-10-CM

## 2023-07-28 DIAGNOSIS — D84.81 IMMUNODEFICIENCY SECONDARY TO NEOPLASM: ICD-10-CM

## 2023-07-28 DIAGNOSIS — T45.1X5A CHEMOTHERAPY-INDUCED NEUTROPENIA: ICD-10-CM

## 2023-07-28 DIAGNOSIS — D49.9 IMMUNODEFICIENCY SECONDARY TO NEOPLASM: ICD-10-CM

## 2023-07-28 DIAGNOSIS — D84.821 IMMUNODEFICIENCY DUE TO DRUG THERAPY: ICD-10-CM

## 2023-07-28 DIAGNOSIS — T45.1X5A ANEMIA DUE TO ANTINEOPLASTIC CHEMOTHERAPY: ICD-10-CM

## 2023-07-28 PROCEDURE — 1126F AMNT PAIN NOTED NONE PRSNT: CPT | Mod: CPTII,S$GLB,, | Performed by: INTERNAL MEDICINE

## 2023-07-28 PROCEDURE — 1159F MED LIST DOCD IN RCRD: CPT | Mod: CPTII,S$GLB,, | Performed by: INTERNAL MEDICINE

## 2023-07-28 PROCEDURE — 99215 PR OFFICE/OUTPT VISIT, EST, LEVL V, 40-54 MIN: ICD-10-PCS | Mod: S$GLB,,, | Performed by: INTERNAL MEDICINE

## 2023-07-28 PROCEDURE — 1101F PR PT FALLS ASSESS DOC 0-1 FALLS W/OUT INJ PAST YR: ICD-10-PCS | Mod: CPTII,S$GLB,, | Performed by: INTERNAL MEDICINE

## 2023-07-28 PROCEDURE — 3008F BODY MASS INDEX DOCD: CPT | Mod: CPTII,S$GLB,, | Performed by: INTERNAL MEDICINE

## 2023-07-28 PROCEDURE — 3288F FALL RISK ASSESSMENT DOCD: CPT | Mod: CPTII,S$GLB,, | Performed by: INTERNAL MEDICINE

## 2023-07-28 PROCEDURE — 3074F PR MOST RECENT SYSTOLIC BLOOD PRESSURE < 130 MM HG: ICD-10-PCS | Mod: CPTII,S$GLB,, | Performed by: INTERNAL MEDICINE

## 2023-07-28 PROCEDURE — 3288F PR FALLS RISK ASSESSMENT DOCUMENTED: ICD-10-PCS | Mod: CPTII,S$GLB,, | Performed by: INTERNAL MEDICINE

## 2023-07-28 PROCEDURE — 3008F PR BODY MASS INDEX (BMI) DOCUMENTED: ICD-10-PCS | Mod: CPTII,S$GLB,, | Performed by: INTERNAL MEDICINE

## 2023-07-28 PROCEDURE — 1126F PR PAIN SEVERITY QUANTIFIED, NO PAIN PRESENT: ICD-10-PCS | Mod: CPTII,S$GLB,, | Performed by: INTERNAL MEDICINE

## 2023-07-28 PROCEDURE — 99999 PR PBB SHADOW E&M-EST. PATIENT-LVL III: CPT | Mod: PBBFAC,,, | Performed by: INTERNAL MEDICINE

## 2023-07-28 PROCEDURE — 3078F DIAST BP <80 MM HG: CPT | Mod: CPTII,S$GLB,, | Performed by: INTERNAL MEDICINE

## 2023-07-28 PROCEDURE — 99999 PR PBB SHADOW E&M-EST. PATIENT-LVL III: ICD-10-PCS | Mod: PBBFAC,,, | Performed by: INTERNAL MEDICINE

## 2023-07-28 PROCEDURE — 1160F PR REVIEW ALL MEDS BY PRESCRIBER/CLIN PHARMACIST DOCUMENTED: ICD-10-PCS | Mod: CPTII,S$GLB,, | Performed by: INTERNAL MEDICINE

## 2023-07-28 PROCEDURE — 1101F PT FALLS ASSESS-DOCD LE1/YR: CPT | Mod: CPTII,S$GLB,, | Performed by: INTERNAL MEDICINE

## 2023-07-28 PROCEDURE — 1159F PR MEDICATION LIST DOCUMENTED IN MEDICAL RECORD: ICD-10-PCS | Mod: CPTII,S$GLB,, | Performed by: INTERNAL MEDICINE

## 2023-07-28 PROCEDURE — 3074F SYST BP LT 130 MM HG: CPT | Mod: CPTII,S$GLB,, | Performed by: INTERNAL MEDICINE

## 2023-07-28 PROCEDURE — 99215 OFFICE O/P EST HI 40 MIN: CPT | Mod: S$GLB,,, | Performed by: INTERNAL MEDICINE

## 2023-07-28 PROCEDURE — 3078F PR MOST RECENT DIASTOLIC BLOOD PRESSURE < 80 MM HG: ICD-10-PCS | Mod: CPTII,S$GLB,, | Performed by: INTERNAL MEDICINE

## 2023-07-28 PROCEDURE — 1160F RVW MEDS BY RX/DR IN RCRD: CPT | Mod: CPTII,S$GLB,, | Performed by: INTERNAL MEDICINE

## 2023-07-28 RX ORDER — AZACITIDINE 100 MG/1
75 INJECTION, POWDER, LYOPHILIZED, FOR SOLUTION INTRAVENOUS; SUBCUTANEOUS
Status: CANCELLED | OUTPATIENT
Start: 2023-08-16

## 2023-07-28 RX ORDER — ONDANSETRON HYDROCHLORIDE 8 MG/1
16 TABLET, FILM COATED ORAL
Status: CANCELLED | OUTPATIENT
Start: 2023-08-15

## 2023-07-28 RX ORDER — AZACITIDINE 100 MG/1
75 INJECTION, POWDER, LYOPHILIZED, FOR SOLUTION INTRAVENOUS; SUBCUTANEOUS
Status: CANCELLED | OUTPATIENT
Start: 2023-08-18

## 2023-07-28 RX ORDER — ONDANSETRON HYDROCHLORIDE 8 MG/1
16 TABLET, FILM COATED ORAL
Status: CANCELLED | OUTPATIENT
Start: 2023-08-18

## 2023-07-28 RX ORDER — AZACITIDINE 100 MG/1
75 INJECTION, POWDER, LYOPHILIZED, FOR SOLUTION INTRAVENOUS; SUBCUTANEOUS
Status: CANCELLED | OUTPATIENT
Start: 2023-08-14

## 2023-07-28 RX ORDER — AZACITIDINE 100 MG/1
75 INJECTION, POWDER, LYOPHILIZED, FOR SOLUTION INTRAVENOUS; SUBCUTANEOUS
Status: CANCELLED | OUTPATIENT
Start: 2023-08-17

## 2023-07-28 RX ORDER — ONDANSETRON HYDROCHLORIDE 8 MG/1
16 TABLET, FILM COATED ORAL
Status: CANCELLED | OUTPATIENT
Start: 2023-08-14

## 2023-07-28 RX ORDER — AZACITIDINE 100 MG/1
75 INJECTION, POWDER, LYOPHILIZED, FOR SOLUTION INTRAVENOUS; SUBCUTANEOUS
Status: CANCELLED | OUTPATIENT
Start: 2023-08-15

## 2023-07-28 RX ORDER — ONDANSETRON HYDROCHLORIDE 8 MG/1
16 TABLET, FILM COATED ORAL
Status: CANCELLED | OUTPATIENT
Start: 2023-08-16

## 2023-07-28 RX ORDER — ONDANSETRON HYDROCHLORIDE 8 MG/1
16 TABLET, FILM COATED ORAL
Status: CANCELLED | OUTPATIENT
Start: 2023-08-17

## 2023-07-30 NOTE — PROGRESS NOTES
Subjective:   Patient ID:  Guillaume Salinas is a 68 y.o. male who presents for management of Claudication, Hyperlipidemia, Hypertension, and Peripheral Arterial Disease      HPI:     He is here by recommendation of his care team for management of Enzo IIb claudication left worse than right.  No rest pain.  No ulceration.  No previous revascularization.  He has a history of tobacco use.  He is ANGIE on the right is 0.08.  Left 0.68.  His arterial ultrasound is suggestive of left common or external iliac stenosis in addition to left SFA  with moderate disease of the right SFA/popliteal. He started rehab but also diagnosed with a malignancy. He is overall doing well.       ANGIE 2023    Right ANGIE 1.08  Left ANGIE 0.68 Suggest moderate PAD on the left side       US 2023    Monophasic waveform in the left lower extremity suggest inflow disease in the common iliac area  Occluded left mid SFA and reconstitute distal from profunda collaterals  Left profunda proximal has significantly hemodynamic stenosis more than 70%  No significant stenosis noted on the right side       Patient Active Problem List    Diagnosis Date Noted    Thrombocytopenia 2023    Pancytopenia 2023    Immunodeficiency secondary to neoplasm 2023    Myelodysplastic syndrome 2023    Macrocytic anemia 2023    Atherosclerosis of native artery of both lower extremities with intermittent claudication 2023    Abnormal sensation of leg 2022    Aortic atherosclerosis 2022    Colon polyps 2022    Hypertension, essential 2021    Iliac artery stenosis, right 2021     10/29/2021 [facility]      Coronary artery disease involving native coronary artery of native heart without angina pectoris 2021    Chest pain 2021    Personal history of nicotine dependence 2021    Hyperlipidemia 10/20/2021           Right Arm BP - Sittin/69  Left Arm BP - Sitting:  111/69        LABS      LAST HbA1c  Lab Results   Component Value Date    HGBA1C 5.2 10/19/2021       Lipid panel  Lab Results   Component Value Date    CHOL 120 04/17/2023    CHOL 119 (L) 02/22/2022    CHOL 229 (H) 10/19/2021     Lab Results   Component Value Date    HDL 25 (L) 04/17/2023    HDL 29 (L) 02/22/2022    HDL 32 (L) 10/19/2021     Lab Results   Component Value Date    LDLCALC 71.4 04/17/2023    LDLCALC 64.4 02/22/2022    LDLCALC 157.4 10/19/2021     Lab Results   Component Value Date    TRIG 118 04/17/2023    TRIG 128 02/22/2022    TRIG 198 (H) 10/19/2021     Lab Results   Component Value Date    CHOLHDL 20.8 04/17/2023    CHOLHDL 24.4 02/22/2022    CHOLHDL 14.0 (L) 10/19/2021            Review of Systems   Constitutional: Negative for diaphoresis, night sweats, weight gain and weight loss.   HENT:  Negative for congestion.    Eyes:  Negative for blurred vision, discharge and double vision.   Cardiovascular:  Positive for claudication. Negative for chest pain, cyanosis, dyspnea on exertion, irregular heartbeat, leg swelling, near-syncope, orthopnea, palpitations, paroxysmal nocturnal dyspnea and syncope.   Respiratory:  Negative for cough, shortness of breath and wheezing.    Endocrine: Negative for cold intolerance, heat intolerance and polyphagia.   Hematologic/Lymphatic: Negative for adenopathy and bleeding problem. Does not bruise/bleed easily.   Skin:  Negative for dry skin and nail changes.   Musculoskeletal:  Negative for arthritis, back pain, falls, joint pain, myalgias and neck pain.   Gastrointestinal:  Negative for bloating, abdominal pain, change in bowel habit and constipation.   Genitourinary:  Negative for bladder incontinence, dysuria, flank pain, genital sores and missed menses.   Neurological:  Negative for aphonia, brief paralysis, difficulty with concentration, dizziness and weakness.   Psychiatric/Behavioral:  Negative for altered mental status and memory loss. The patient does not  have insomnia.    Allergic/Immunologic: Negative for environmental allergies.       Objective:   Physical Exam  Constitutional:       Appearance: He is well-developed.      Interventions: He is not intubated.  HENT:      Head: Normocephalic and atraumatic.      Right Ear: External ear normal.      Left Ear: External ear normal.   Eyes:      General: No scleral icterus.        Right eye: No discharge.         Left eye: No discharge.      Conjunctiva/sclera: Conjunctivae normal.      Pupils: Pupils are equal, round, and reactive to light.   Neck:      Thyroid: No thyromegaly.      Vascular: Normal carotid pulses. No carotid bruit, hepatojugular reflux or JVD.      Trachea: No tracheal deviation.   Cardiovascular:      Rate and Rhythm: Normal rate and regular rhythm. No extrasystoles are present.     Chest Wall: PMI is not displaced.      Pulses:           Carotid pulses are 2+ on the right side and 2+ on the left side.       Radial pulses are 2+ on the right side and 2+ on the left side.        Femoral pulses are 2+ on the right side and 1+ on the left side.       Popliteal pulses are 0 on the right side and 0 on the left side.        Dorsalis pedis pulses are 0 on the right side and 0 on the left side.        Posterior tibial pulses are 0 on the right side and 0 on the left side.      Heart sounds: S1 normal and S2 normal. Heart sounds not distant. No midsystolic click. No murmur heard.     No friction rub. No gallop. No S3 sounds.      Comments:     Faint L PT and DP doppler signals       Monophasic R PT and biphasic R DP doppler signals       Pulmonary:      Effort: Pulmonary effort is normal. No tachypnea, bradypnea, accessory muscle usage or respiratory distress. He is not intubated.      Breath sounds: Normal breath sounds. No stridor. No decreased breath sounds, wheezing or rales.   Chest:      Chest wall: No tenderness.   Abdominal:      General: There is no distension or abdominal bruit.      Palpations:  There is no mass or pulsatile mass.      Tenderness: There is no abdominal tenderness. There is no guarding or rebound.   Musculoskeletal:         General: No tenderness. Normal range of motion.      Cervical back: Normal range of motion and neck supple.   Lymphadenopathy:      Cervical: No cervical adenopathy.   Skin:     General: Skin is warm.      Coloration: Skin is not pale.      Findings: No erythema or rash.   Neurological:      Mental Status: He is alert and oriented to person, place, and time.      Cranial Nerves: No cranial nerve deficit.      Coordination: Coordination normal.      Deep Tendon Reflexes: Reflexes are normal and symmetric.   Psychiatric:         Behavior: Behavior normal.         Thought Content: Thought content normal.         Judgment: Judgment normal.         Assessment:     1. Atherosclerosis of native artery of both lower extremities with intermittent claudication    2. Coronary artery disease involving native coronary artery of native heart without angina pectoris    3. Hypertension, essential    4. Aortic atherosclerosis    5. Iliac artery stenosis, right    6. Mixed hyperlipidemia    7. Myelodysplastic syndrome          Plan:       We had a long discussion regarding pathophysiology and progression of atherosclerosis and claudication.  We will continue Pletal 50 mg twice daily plan of titrated to 100 mg twice daily.  Aspirin 81 mg daily as tolerated. Intense statin therapy with LDL goal less than 70.  Initiate supervised exercise with peripheral arterial disease rehabilitation.  Follow up in 3 months.  Proper foot care.  Smoking cessation was discussed at length with the patient.  Patient and wife expressed verbal understanding of the plan.  All the questions were answered to his satisfaction during the visit.        Continue with current medical plan and lifestyle changes.  Return sooner for concerns or questions. If symptoms persist go to the ED  I have reviewed all pertinent data  on this patient       I have reviewed the patient's medical history in detail and updated the computerized patient record.    Orders Placed This Encounter   Procedures    Ankle Brachial Indices (ANGIE)     Standing Status:   Future     Standing Expiration Date:   8/24/2024     Order Specific Question:   Exam Type:     Answer:   Exercise     Order Specific Question:   Release to patient     Answer:   Immediate       Follow up as scheduled. Return sooner for concerns or questions            He expressed verbal understanding and agreed with the plan        -In today's visit, at least 4 established conditions that pose a risk to life or bodily function have been addressed and the conditions are severe.    -In today's visit, monitoring for drug toxicity was accomplished.

## 2023-08-02 ENCOUNTER — OFFICE VISIT (OUTPATIENT)
Dept: CARDIOLOGY | Facility: CLINIC | Age: 68
End: 2023-08-02
Payer: MEDICARE

## 2023-08-02 VITALS
OXYGEN SATURATION: 98 % | SYSTOLIC BLOOD PRESSURE: 111 MMHG | HEIGHT: 69 IN | WEIGHT: 158 LBS | DIASTOLIC BLOOD PRESSURE: 69 MMHG | BODY MASS INDEX: 23.4 KG/M2 | HEART RATE: 72 BPM

## 2023-08-02 DIAGNOSIS — I25.10 CORONARY ARTERY DISEASE INVOLVING NATIVE CORONARY ARTERY OF NATIVE HEART WITHOUT ANGINA PECTORIS: ICD-10-CM

## 2023-08-02 DIAGNOSIS — I10 HYPERTENSION, ESSENTIAL: ICD-10-CM

## 2023-08-02 DIAGNOSIS — I70.213 ATHEROSCLEROSIS OF NATIVE ARTERY OF BOTH LOWER EXTREMITIES WITH INTERMITTENT CLAUDICATION: Primary | ICD-10-CM

## 2023-08-02 DIAGNOSIS — I77.1 ILIAC ARTERY STENOSIS, RIGHT: ICD-10-CM

## 2023-08-02 DIAGNOSIS — D46.9 MYELODYSPLASTIC SYNDROME: ICD-10-CM

## 2023-08-02 DIAGNOSIS — I70.0 AORTIC ATHEROSCLEROSIS: ICD-10-CM

## 2023-08-02 DIAGNOSIS — E78.2 MIXED HYPERLIPIDEMIA: ICD-10-CM

## 2023-08-02 PROCEDURE — 3074F SYST BP LT 130 MM HG: CPT | Mod: CPTII,S$GLB,, | Performed by: INTERNAL MEDICINE

## 2023-08-02 PROCEDURE — 99215 OFFICE O/P EST HI 40 MIN: CPT | Mod: S$GLB,,, | Performed by: INTERNAL MEDICINE

## 2023-08-02 PROCEDURE — 99215 PR OFFICE/OUTPT VISIT, EST, LEVL V, 40-54 MIN: ICD-10-PCS | Mod: S$GLB,,, | Performed by: INTERNAL MEDICINE

## 2023-08-02 PROCEDURE — 99999 PR PBB SHADOW E&M-EST. PATIENT-LVL III: CPT | Mod: PBBFAC,,, | Performed by: INTERNAL MEDICINE

## 2023-08-02 PROCEDURE — 99999 PR PBB SHADOW E&M-EST. PATIENT-LVL III: ICD-10-PCS | Mod: PBBFAC,,, | Performed by: INTERNAL MEDICINE

## 2023-08-02 PROCEDURE — 1126F PR PAIN SEVERITY QUANTIFIED, NO PAIN PRESENT: ICD-10-PCS | Mod: CPTII,S$GLB,, | Performed by: INTERNAL MEDICINE

## 2023-08-02 PROCEDURE — 3008F PR BODY MASS INDEX (BMI) DOCUMENTED: ICD-10-PCS | Mod: CPTII,S$GLB,, | Performed by: INTERNAL MEDICINE

## 2023-08-02 PROCEDURE — 3078F PR MOST RECENT DIASTOLIC BLOOD PRESSURE < 80 MM HG: ICD-10-PCS | Mod: CPTII,S$GLB,, | Performed by: INTERNAL MEDICINE

## 2023-08-02 PROCEDURE — 3074F PR MOST RECENT SYSTOLIC BLOOD PRESSURE < 130 MM HG: ICD-10-PCS | Mod: CPTII,S$GLB,, | Performed by: INTERNAL MEDICINE

## 2023-08-02 PROCEDURE — 1126F AMNT PAIN NOTED NONE PRSNT: CPT | Mod: CPTII,S$GLB,, | Performed by: INTERNAL MEDICINE

## 2023-08-02 PROCEDURE — 3078F DIAST BP <80 MM HG: CPT | Mod: CPTII,S$GLB,, | Performed by: INTERNAL MEDICINE

## 2023-08-02 PROCEDURE — 3008F BODY MASS INDEX DOCD: CPT | Mod: CPTII,S$GLB,, | Performed by: INTERNAL MEDICINE

## 2023-08-03 ENCOUNTER — LAB VISIT (OUTPATIENT)
Dept: LAB | Facility: HOSPITAL | Age: 68
End: 2023-08-03
Payer: MEDICARE

## 2023-08-03 ENCOUNTER — DOCUMENTATION ONLY (OUTPATIENT)
Dept: HEMATOLOGY/ONCOLOGY | Facility: CLINIC | Age: 68
End: 2023-08-03
Payer: MEDICARE

## 2023-08-03 ENCOUNTER — TELEPHONE (OUTPATIENT)
Dept: FAMILY MEDICINE | Facility: HOSPITAL | Age: 68
End: 2023-08-03
Payer: MEDICARE

## 2023-08-03 DIAGNOSIS — D46.9 MYELODYSPLASTIC SYNDROME: ICD-10-CM

## 2023-08-03 LAB
ABO + RH BLD: NORMAL
ALBUMIN SERPL BCP-MCNC: 3.9 G/DL (ref 3.5–5.2)
ALBUMIN SERPL BCP-MCNC: 3.9 G/DL (ref 3.5–5.2)
ALP SERPL-CCNC: 79 U/L (ref 55–135)
ALP SERPL-CCNC: 79 U/L (ref 55–135)
ALT SERPL W/O P-5'-P-CCNC: 21 U/L (ref 10–44)
ALT SERPL W/O P-5'-P-CCNC: 21 U/L (ref 10–44)
ANION GAP SERPL CALC-SCNC: 10 MMOL/L (ref 8–16)
ANION GAP SERPL CALC-SCNC: 10 MMOL/L (ref 8–16)
ANISOCYTOSIS BLD QL SMEAR: SLIGHT
ANISOCYTOSIS BLD QL SMEAR: SLIGHT
AST SERPL-CCNC: 24 U/L (ref 10–40)
AST SERPL-CCNC: 24 U/L (ref 10–40)
BASOPHILS # BLD AUTO: ABNORMAL K/UL (ref 0–0.2)
BASOPHILS # BLD AUTO: ABNORMAL K/UL (ref 0–0.2)
BASOPHILS NFR BLD: 2 % (ref 0–1.9)
BASOPHILS NFR BLD: 2 % (ref 0–1.9)
BILIRUB SERPL-MCNC: 0.5 MG/DL (ref 0.1–1)
BILIRUB SERPL-MCNC: 0.5 MG/DL (ref 0.1–1)
BLD GP AB SCN CELLS X3 SERPL QL: NORMAL
BUN SERPL-MCNC: 17 MG/DL (ref 8–23)
BUN SERPL-MCNC: 17 MG/DL (ref 8–23)
CALCIUM SERPL-MCNC: 9.8 MG/DL (ref 8.7–10.5)
CALCIUM SERPL-MCNC: 9.8 MG/DL (ref 8.7–10.5)
CHLORIDE SERPL-SCNC: 106 MMOL/L (ref 95–110)
CHLORIDE SERPL-SCNC: 106 MMOL/L (ref 95–110)
CO2 SERPL-SCNC: 25 MMOL/L (ref 23–29)
CO2 SERPL-SCNC: 25 MMOL/L (ref 23–29)
CREAT SERPL-MCNC: 1.1 MG/DL (ref 0.5–1.4)
CREAT SERPL-MCNC: 1.1 MG/DL (ref 0.5–1.4)
DIFFERENTIAL METHOD: ABNORMAL
DIFFERENTIAL METHOD: ABNORMAL
EOSINOPHIL # BLD AUTO: ABNORMAL K/UL (ref 0–0.5)
EOSINOPHIL # BLD AUTO: ABNORMAL K/UL (ref 0–0.5)
EOSINOPHIL NFR BLD: 3 % (ref 0–8)
EOSINOPHIL NFR BLD: 3 % (ref 0–8)
ERYTHROCYTE [DISTWIDTH] IN BLOOD BY AUTOMATED COUNT: 25.3 % (ref 11.5–14.5)
ERYTHROCYTE [DISTWIDTH] IN BLOOD BY AUTOMATED COUNT: 25.3 % (ref 11.5–14.5)
EST. GFR  (NO RACE VARIABLE): >60 ML/MIN/1.73 M^2
EST. GFR  (NO RACE VARIABLE): >60 ML/MIN/1.73 M^2
GLUCOSE SERPL-MCNC: 127 MG/DL (ref 70–110)
GLUCOSE SERPL-MCNC: 127 MG/DL (ref 70–110)
HCT VFR BLD AUTO: 36.3 % (ref 40–54)
HCT VFR BLD AUTO: 36.3 % (ref 40–54)
HGB BLD-MCNC: 11.2 G/DL (ref 14–18)
HGB BLD-MCNC: 11.2 G/DL (ref 14–18)
HYPOCHROMIA BLD QL SMEAR: ABNORMAL
HYPOCHROMIA BLD QL SMEAR: ABNORMAL
IMM GRANULOCYTES # BLD AUTO: ABNORMAL K/UL (ref 0–0.04)
IMM GRANULOCYTES # BLD AUTO: ABNORMAL K/UL (ref 0–0.04)
IMM GRANULOCYTES NFR BLD AUTO: ABNORMAL % (ref 0–0.5)
IMM GRANULOCYTES NFR BLD AUTO: ABNORMAL % (ref 0–0.5)
LDH SERPL L TO P-CCNC: 137 U/L (ref 110–260)
LYMPHOCYTES # BLD AUTO: ABNORMAL K/UL (ref 1–4.8)
LYMPHOCYTES # BLD AUTO: ABNORMAL K/UL (ref 1–4.8)
LYMPHOCYTES NFR BLD: 70 % (ref 18–48)
LYMPHOCYTES NFR BLD: 70 % (ref 18–48)
MAGNESIUM SERPL-MCNC: 2.1 MG/DL (ref 1.6–2.6)
MCH RBC QN AUTO: 31.5 PG (ref 27–31)
MCH RBC QN AUTO: 31.5 PG (ref 27–31)
MCHC RBC AUTO-ENTMCNC: 30.9 G/DL (ref 32–36)
MCHC RBC AUTO-ENTMCNC: 30.9 G/DL (ref 32–36)
MCV RBC AUTO: 102 FL (ref 82–98)
MCV RBC AUTO: 102 FL (ref 82–98)
MONOCYTES # BLD AUTO: ABNORMAL K/UL (ref 0.3–1)
MONOCYTES # BLD AUTO: ABNORMAL K/UL (ref 0.3–1)
MONOCYTES NFR BLD: 2 % (ref 4–15)
MONOCYTES NFR BLD: 2 % (ref 4–15)
NEUTROPHILS NFR BLD: 22 % (ref 38–73)
NEUTROPHILS NFR BLD: 22 % (ref 38–73)
NEUTS BAND NFR BLD MANUAL: 1 %
NEUTS BAND NFR BLD MANUAL: 1 %
NRBC BLD-RTO: 0 /100 WBC
NRBC BLD-RTO: 0 /100 WBC
PHOSPHATE SERPL-MCNC: 4.2 MG/DL (ref 2.7–4.5)
PLATELET # BLD AUTO: 337 K/UL (ref 150–450)
PLATELET # BLD AUTO: 337 K/UL (ref 150–450)
PLATELET BLD QL SMEAR: ABNORMAL
PLATELET BLD QL SMEAR: ABNORMAL
PMV BLD AUTO: 9.9 FL (ref 9.2–12.9)
PMV BLD AUTO: 9.9 FL (ref 9.2–12.9)
POIKILOCYTOSIS BLD QL SMEAR: SLIGHT
POIKILOCYTOSIS BLD QL SMEAR: SLIGHT
POLYCHROMASIA BLD QL SMEAR: ABNORMAL
POLYCHROMASIA BLD QL SMEAR: ABNORMAL
POTASSIUM SERPL-SCNC: 4.6 MMOL/L (ref 3.5–5.1)
POTASSIUM SERPL-SCNC: 4.6 MMOL/L (ref 3.5–5.1)
PROT SERPL-MCNC: 7.5 G/DL (ref 6–8.4)
PROT SERPL-MCNC: 7.5 G/DL (ref 6–8.4)
RBC # BLD AUTO: 3.56 M/UL (ref 4.6–6.2)
RBC # BLD AUTO: 3.56 M/UL (ref 4.6–6.2)
SODIUM SERPL-SCNC: 141 MMOL/L (ref 136–145)
SODIUM SERPL-SCNC: 141 MMOL/L (ref 136–145)
SPECIMEN OUTDATE: NORMAL
URATE SERPL-MCNC: 3.1 MG/DL (ref 3.4–7)
WBC # BLD AUTO: 1.7 K/UL (ref 3.9–12.7)
WBC # BLD AUTO: 1.7 K/UL (ref 3.9–12.7)

## 2023-08-03 PROCEDURE — 86900 BLOOD TYPING SEROLOGIC ABO: CPT | Performed by: INTERNAL MEDICINE

## 2023-08-03 PROCEDURE — 85027 COMPLETE CBC AUTOMATED: CPT | Performed by: INTERNAL MEDICINE

## 2023-08-03 PROCEDURE — 84550 ASSAY OF BLOOD/URIC ACID: CPT | Performed by: INTERNAL MEDICINE

## 2023-08-03 PROCEDURE — 84100 ASSAY OF PHOSPHORUS: CPT | Performed by: INTERNAL MEDICINE

## 2023-08-03 PROCEDURE — 80053 COMPREHEN METABOLIC PANEL: CPT | Performed by: INTERNAL MEDICINE

## 2023-08-03 PROCEDURE — 83735 ASSAY OF MAGNESIUM: CPT | Performed by: INTERNAL MEDICINE

## 2023-08-03 PROCEDURE — 85007 BL SMEAR W/DIFF WBC COUNT: CPT | Performed by: INTERNAL MEDICINE

## 2023-08-03 PROCEDURE — 83615 LACTATE (LD) (LDH) ENZYME: CPT | Performed by: INTERNAL MEDICINE

## 2023-08-03 NOTE — TELEPHONE ENCOUNTER
Justyn from [facility] called with a Critical lab value of WBC-1.7. High Priority message sent to Dr. Modesto Mcclure as Dr. Harry Diamond is out. Value entered into Flowsheets.   DISPLAY PLAN FREE TEXT

## 2023-08-04 LAB
COMMENT: NORMAL
FINAL PATHOLOGIC DIAGNOSIS: NORMAL
GROSS: NORMAL
Lab: NORMAL
MICROSCOPIC EXAM: NORMAL
SUPPLEMENTAL DIAGNOSIS: NORMAL

## 2023-08-07 ENCOUNTER — TELEPHONE (OUTPATIENT)
Dept: INFUSION THERAPY | Facility: HOSPITAL | Age: 68
End: 2023-08-07
Payer: MEDICARE

## 2023-08-07 NOTE — TELEPHONE ENCOUNTER
Message received from Dr. Diamond, cycle 3 Vidaza injections push back 1 week d/t labs (ANC < 500, Dr. Garcia recommendations). Pt being rescheduled to next week and updated in person.

## 2023-08-10 ENCOUNTER — LAB VISIT (OUTPATIENT)
Dept: LAB | Facility: HOSPITAL | Age: 68
End: 2023-08-10
Payer: MEDICARE

## 2023-08-10 DIAGNOSIS — D46.9 MYELODYSPLASTIC SYNDROME: ICD-10-CM

## 2023-08-10 LAB
ABO + RH BLD: NORMAL
ALBUMIN SERPL BCP-MCNC: 4 G/DL (ref 3.5–5.2)
ALP SERPL-CCNC: 81 U/L (ref 55–135)
ALT SERPL W/O P-5'-P-CCNC: 20 U/L (ref 10–44)
ANION GAP SERPL CALC-SCNC: 4 MMOL/L (ref 8–16)
ANISOCYTOSIS BLD QL SMEAR: SLIGHT
AST SERPL-CCNC: 18 U/L (ref 10–40)
BASOPHILS # BLD AUTO: 0.05 K/UL (ref 0–0.2)
BASOPHILS NFR BLD: 2.2 % (ref 0–1.9)
BILIRUB SERPL-MCNC: 0.3 MG/DL (ref 0.1–1)
BLD GP AB SCN CELLS X3 SERPL QL: NORMAL
BUN SERPL-MCNC: 15 MG/DL (ref 8–23)
CALCIUM SERPL-MCNC: 9.3 MG/DL (ref 8.7–10.5)
CHLORIDE SERPL-SCNC: 107 MMOL/L (ref 95–110)
CO2 SERPL-SCNC: 30 MMOL/L (ref 23–29)
CREAT SERPL-MCNC: 1.1 MG/DL (ref 0.5–1.4)
DIFFERENTIAL METHOD: ABNORMAL
EOSINOPHIL # BLD AUTO: 0.2 K/UL (ref 0–0.5)
EOSINOPHIL NFR BLD: 6.6 % (ref 0–8)
ERYTHROCYTE [DISTWIDTH] IN BLOOD BY AUTOMATED COUNT: 24.1 % (ref 11.5–14.5)
EST. GFR  (NO RACE VARIABLE): >60 ML/MIN/1.73 M^2
GLUCOSE SERPL-MCNC: 110 MG/DL (ref 70–110)
HCT VFR BLD AUTO: 38.3 % (ref 40–54)
HGB BLD-MCNC: 12.2 G/DL (ref 14–18)
HYPOCHROMIA BLD QL SMEAR: ABNORMAL
IMM GRANULOCYTES # BLD AUTO: 0 K/UL (ref 0–0.04)
IMM GRANULOCYTES NFR BLD AUTO: 0 % (ref 0–0.5)
LDH SERPL L TO P-CCNC: 101 U/L (ref 110–260)
LYMPHOCYTES # BLD AUTO: 1.3 K/UL (ref 1–4.8)
LYMPHOCYTES NFR BLD: 57 % (ref 18–48)
MCH RBC QN AUTO: 32.6 PG (ref 27–31)
MCHC RBC AUTO-ENTMCNC: 31.9 G/DL (ref 32–36)
MCV RBC AUTO: 102 FL (ref 82–98)
MONOCYTES # BLD AUTO: 0.4 K/UL (ref 0.3–1)
MONOCYTES NFR BLD: 16.2 % (ref 4–15)
NEUTROPHILS # BLD AUTO: 0.4 K/UL (ref 1.8–7.7)
NEUTROPHILS NFR BLD: 18 % (ref 38–73)
NRBC BLD-RTO: 0 /100 WBC
PLATELET # BLD AUTO: 231 K/UL (ref 150–450)
PLATELET BLD QL SMEAR: ABNORMAL
PMV BLD AUTO: 10.2 FL (ref 9.2–12.9)
POIKILOCYTOSIS BLD QL SMEAR: SLIGHT
POLYCHROMASIA BLD QL SMEAR: ABNORMAL
POTASSIUM SERPL-SCNC: 4.2 MMOL/L (ref 3.5–5.1)
PROT SERPL-MCNC: 7.4 G/DL (ref 6–8.4)
RBC # BLD AUTO: 3.74 M/UL (ref 4.6–6.2)
SODIUM SERPL-SCNC: 141 MMOL/L (ref 136–145)
SPECIMEN OUTDATE: NORMAL
URATE SERPL-MCNC: 2.4 MG/DL (ref 3.4–7)
WBC # BLD AUTO: 2.28 K/UL (ref 3.9–12.7)

## 2023-08-10 PROCEDURE — 84550 ASSAY OF BLOOD/URIC ACID: CPT | Performed by: INTERNAL MEDICINE

## 2023-08-10 PROCEDURE — 86900 BLOOD TYPING SEROLOGIC ABO: CPT | Performed by: INTERNAL MEDICINE

## 2023-08-10 PROCEDURE — 83615 LACTATE (LD) (LDH) ENZYME: CPT | Performed by: INTERNAL MEDICINE

## 2023-08-10 PROCEDURE — 80053 COMPREHEN METABOLIC PANEL: CPT | Performed by: INTERNAL MEDICINE

## 2023-08-10 PROCEDURE — 85025 COMPLETE CBC W/AUTO DIFF WBC: CPT | Performed by: INTERNAL MEDICINE

## 2023-08-10 PROCEDURE — 36415 COLL VENOUS BLD VENIPUNCTURE: CPT | Performed by: INTERNAL MEDICINE

## 2023-08-14 ENCOUNTER — INFUSION (OUTPATIENT)
Dept: INFUSION THERAPY | Facility: HOSPITAL | Age: 68
End: 2023-08-14
Attending: INTERNAL MEDICINE
Payer: MEDICARE

## 2023-08-14 VITALS
WEIGHT: 165.25 LBS | HEIGHT: 69 IN | RESPIRATION RATE: 18 BRPM | HEART RATE: 72 BPM | TEMPERATURE: 99 F | DIASTOLIC BLOOD PRESSURE: 65 MMHG | OXYGEN SATURATION: 97 % | SYSTOLIC BLOOD PRESSURE: 131 MMHG | BODY MASS INDEX: 24.48 KG/M2

## 2023-08-14 DIAGNOSIS — D46.9 MYELODYSPLASTIC SYNDROME: Primary | ICD-10-CM

## 2023-08-14 PROCEDURE — 63600175 PHARM REV CODE 636 W HCPCS: Performed by: INTERNAL MEDICINE

## 2023-08-14 PROCEDURE — 25000003 PHARM REV CODE 250: Performed by: INTERNAL MEDICINE

## 2023-08-14 PROCEDURE — 96401 CHEMO ANTI-NEOPL SQ/IM: CPT

## 2023-08-14 RX ORDER — ONDANSETRON HYDROCHLORIDE 8 MG/1
16 TABLET, FILM COATED ORAL
Status: COMPLETED | OUTPATIENT
Start: 2023-08-14 | End: 2023-08-14

## 2023-08-14 RX ORDER — AZACITIDINE 100 MG/1
75 INJECTION, POWDER, LYOPHILIZED, FOR SOLUTION INTRAVENOUS; SUBCUTANEOUS
Status: COMPLETED | OUTPATIENT
Start: 2023-08-14 | End: 2023-08-14

## 2023-08-14 RX ADMIN — ONDANSETRON HYDROCHLORIDE 16 MG: 8 TABLET, FILM COATED ORAL at 09:08

## 2023-08-14 RX ADMIN — AZACITIDINE 140 MG: 100 INJECTION, POWDER, LYOPHILIZED, FOR SOLUTION INTRAVENOUS; SUBCUTANEOUS at 09:08

## 2023-08-15 ENCOUNTER — INFUSION (OUTPATIENT)
Dept: INFUSION THERAPY | Facility: HOSPITAL | Age: 68
End: 2023-08-15
Attending: INTERNAL MEDICINE
Payer: MEDICARE

## 2023-08-15 ENCOUNTER — OFFICE VISIT (OUTPATIENT)
Dept: HEMATOLOGY/ONCOLOGY | Facility: CLINIC | Age: 68
End: 2023-08-15
Payer: MEDICARE

## 2023-08-15 VITALS
BODY MASS INDEX: 24.5 KG/M2 | TEMPERATURE: 99 F | RESPIRATION RATE: 18 BRPM | HEIGHT: 69 IN | OXYGEN SATURATION: 97 % | HEART RATE: 77 BPM | SYSTOLIC BLOOD PRESSURE: 116 MMHG | WEIGHT: 165.44 LBS | DIASTOLIC BLOOD PRESSURE: 62 MMHG

## 2023-08-15 VITALS
OXYGEN SATURATION: 97 % | HEART RATE: 70 BPM | HEIGHT: 69 IN | WEIGHT: 165.44 LBS | TEMPERATURE: 98 F | BODY MASS INDEX: 24.5 KG/M2 | SYSTOLIC BLOOD PRESSURE: 143 MMHG | DIASTOLIC BLOOD PRESSURE: 70 MMHG

## 2023-08-15 DIAGNOSIS — D61.818 PANCYTOPENIA: ICD-10-CM

## 2023-08-15 DIAGNOSIS — D46.9 MYELODYSPLASTIC SYNDROME: Primary | ICD-10-CM

## 2023-08-15 DIAGNOSIS — Z79.899 IMMUNODEFICIENCY DUE TO DRUG THERAPY: ICD-10-CM

## 2023-08-15 DIAGNOSIS — Z76.82 STEM CELL TRANSPLANT CANDIDATE: ICD-10-CM

## 2023-08-15 DIAGNOSIS — D84.821 IMMUNODEFICIENCY DUE TO DRUG THERAPY: ICD-10-CM

## 2023-08-15 PROCEDURE — 3078F DIAST BP <80 MM HG: CPT | Mod: CPTII,S$GLB,, | Performed by: INTERNAL MEDICINE

## 2023-08-15 PROCEDURE — 1126F PR PAIN SEVERITY QUANTIFIED, NO PAIN PRESENT: ICD-10-PCS | Mod: CPTII,S$GLB,, | Performed by: INTERNAL MEDICINE

## 2023-08-15 PROCEDURE — 3288F PR FALLS RISK ASSESSMENT DOCUMENTED: ICD-10-PCS | Mod: CPTII,S$GLB,, | Performed by: INTERNAL MEDICINE

## 2023-08-15 PROCEDURE — 1160F RVW MEDS BY RX/DR IN RCRD: CPT | Mod: CPTII,S$GLB,, | Performed by: INTERNAL MEDICINE

## 2023-08-15 PROCEDURE — 99215 PR OFFICE/OUTPT VISIT, EST, LEVL V, 40-54 MIN: ICD-10-PCS | Mod: S$GLB,,, | Performed by: INTERNAL MEDICINE

## 2023-08-15 PROCEDURE — 3077F SYST BP >= 140 MM HG: CPT | Mod: CPTII,S$GLB,, | Performed by: INTERNAL MEDICINE

## 2023-08-15 PROCEDURE — 3288F FALL RISK ASSESSMENT DOCD: CPT | Mod: CPTII,S$GLB,, | Performed by: INTERNAL MEDICINE

## 2023-08-15 PROCEDURE — 1159F PR MEDICATION LIST DOCUMENTED IN MEDICAL RECORD: ICD-10-PCS | Mod: CPTII,S$GLB,, | Performed by: INTERNAL MEDICINE

## 2023-08-15 PROCEDURE — 99999 PR PBB SHADOW E&M-EST. PATIENT-LVL IV: CPT | Mod: PBBFAC,,, | Performed by: INTERNAL MEDICINE

## 2023-08-15 PROCEDURE — 3008F BODY MASS INDEX DOCD: CPT | Mod: CPTII,S$GLB,, | Performed by: INTERNAL MEDICINE

## 2023-08-15 PROCEDURE — 1160F PR REVIEW ALL MEDS BY PRESCRIBER/CLIN PHARMACIST DOCUMENTED: ICD-10-PCS | Mod: CPTII,S$GLB,, | Performed by: INTERNAL MEDICINE

## 2023-08-15 PROCEDURE — 3078F PR MOST RECENT DIASTOLIC BLOOD PRESSURE < 80 MM HG: ICD-10-PCS | Mod: CPTII,S$GLB,, | Performed by: INTERNAL MEDICINE

## 2023-08-15 PROCEDURE — 1126F AMNT PAIN NOTED NONE PRSNT: CPT | Mod: CPTII,S$GLB,, | Performed by: INTERNAL MEDICINE

## 2023-08-15 PROCEDURE — 1101F PT FALLS ASSESS-DOCD LE1/YR: CPT | Mod: CPTII,S$GLB,, | Performed by: INTERNAL MEDICINE

## 2023-08-15 PROCEDURE — 99215 OFFICE O/P EST HI 40 MIN: CPT | Mod: S$GLB,,, | Performed by: INTERNAL MEDICINE

## 2023-08-15 PROCEDURE — 1101F PR PT FALLS ASSESS DOC 0-1 FALLS W/OUT INJ PAST YR: ICD-10-PCS | Mod: CPTII,S$GLB,, | Performed by: INTERNAL MEDICINE

## 2023-08-15 PROCEDURE — 63600175 PHARM REV CODE 636 W HCPCS: Performed by: INTERNAL MEDICINE

## 2023-08-15 PROCEDURE — 1159F MED LIST DOCD IN RCRD: CPT | Mod: CPTII,S$GLB,, | Performed by: INTERNAL MEDICINE

## 2023-08-15 PROCEDURE — 96401 CHEMO ANTI-NEOPL SQ/IM: CPT

## 2023-08-15 PROCEDURE — 3008F PR BODY MASS INDEX (BMI) DOCUMENTED: ICD-10-PCS | Mod: CPTII,S$GLB,, | Performed by: INTERNAL MEDICINE

## 2023-08-15 PROCEDURE — 25000003 PHARM REV CODE 250: Performed by: INTERNAL MEDICINE

## 2023-08-15 PROCEDURE — 99999 PR PBB SHADOW E&M-EST. PATIENT-LVL IV: ICD-10-PCS | Mod: PBBFAC,,, | Performed by: INTERNAL MEDICINE

## 2023-08-15 PROCEDURE — 3077F PR MOST RECENT SYSTOLIC BLOOD PRESSURE >= 140 MM HG: ICD-10-PCS | Mod: CPTII,S$GLB,, | Performed by: INTERNAL MEDICINE

## 2023-08-15 RX ORDER — AZACITIDINE 100 MG/1
75 INJECTION, POWDER, LYOPHILIZED, FOR SOLUTION INTRAVENOUS; SUBCUTANEOUS
Status: COMPLETED | OUTPATIENT
Start: 2023-08-15 | End: 2023-08-15

## 2023-08-15 RX ORDER — ONDANSETRON HYDROCHLORIDE 8 MG/1
16 TABLET, FILM COATED ORAL
Status: COMPLETED | OUTPATIENT
Start: 2023-08-15 | End: 2023-08-15

## 2023-08-15 RX ADMIN — AZACITIDINE 140 MG: 100 INJECTION, POWDER, LYOPHILIZED, FOR SOLUTION INTRAVENOUS; SUBCUTANEOUS at 01:08

## 2023-08-15 RX ADMIN — ONDANSETRON HYDROCHLORIDE 16 MG: 8 TABLET, FILM COATED ORAL at 12:08

## 2023-08-15 NOTE — NURSING
Pt tolerated vidaza infusion well.  No adverse reaction noted.   IV flushed with NS and D/C per protocol.  Patient left clinic in no acute distress.

## 2023-08-15 NOTE — Clinical Note
Just FYI, I decreased venetoclax to 7 days due to prolonged neutropenia. I updated the prescription in epic. Thanks!

## 2023-08-16 ENCOUNTER — INFUSION (OUTPATIENT)
Dept: INFUSION THERAPY | Facility: HOSPITAL | Age: 68
End: 2023-08-16
Attending: INTERNAL MEDICINE
Payer: MEDICARE

## 2023-08-16 VITALS
WEIGHT: 165.44 LBS | DIASTOLIC BLOOD PRESSURE: 70 MMHG | SYSTOLIC BLOOD PRESSURE: 122 MMHG | BODY MASS INDEX: 24.5 KG/M2 | HEART RATE: 85 BPM | TEMPERATURE: 98 F | HEIGHT: 69 IN | OXYGEN SATURATION: 96 % | RESPIRATION RATE: 17 BRPM

## 2023-08-16 DIAGNOSIS — D46.9 MYELODYSPLASTIC SYNDROME: Primary | ICD-10-CM

## 2023-08-16 PROCEDURE — 63600175 PHARM REV CODE 636 W HCPCS: Performed by: INTERNAL MEDICINE

## 2023-08-16 PROCEDURE — 96401 CHEMO ANTI-NEOPL SQ/IM: CPT

## 2023-08-16 PROCEDURE — 25000003 PHARM REV CODE 250: Performed by: INTERNAL MEDICINE

## 2023-08-16 RX ORDER — AZACITIDINE 100 MG/1
75 INJECTION, POWDER, LYOPHILIZED, FOR SOLUTION INTRAVENOUS; SUBCUTANEOUS
Status: COMPLETED | OUTPATIENT
Start: 2023-08-16 | End: 2023-08-16

## 2023-08-16 RX ORDER — ONDANSETRON HYDROCHLORIDE 8 MG/1
16 TABLET, FILM COATED ORAL
Status: COMPLETED | OUTPATIENT
Start: 2023-08-16 | End: 2023-08-16

## 2023-08-16 RX ADMIN — AZACITIDINE 140 MG: 100 INJECTION, POWDER, LYOPHILIZED, FOR SOLUTION INTRAVENOUS; SUBCUTANEOUS at 03:08

## 2023-08-16 RX ADMIN — ONDANSETRON HYDROCHLORIDE 16 MG: 8 TABLET, FILM COATED ORAL at 02:08

## 2023-08-16 NOTE — NURSING
Patient tolerated C3D3 Vidaza injection well, VSS. No complaints at this time. Next appointment scheduled and pt d/c in no acute distress.

## 2023-08-17 ENCOUNTER — INFUSION (OUTPATIENT)
Dept: INFUSION THERAPY | Facility: HOSPITAL | Age: 68
End: 2023-08-17
Attending: INTERNAL MEDICINE
Payer: MEDICARE

## 2023-08-17 VITALS
SYSTOLIC BLOOD PRESSURE: 121 MMHG | BODY MASS INDEX: 24.5 KG/M2 | OXYGEN SATURATION: 95 % | WEIGHT: 165.44 LBS | RESPIRATION RATE: 17 BRPM | DIASTOLIC BLOOD PRESSURE: 61 MMHG | TEMPERATURE: 98 F | HEIGHT: 69 IN | HEART RATE: 85 BPM

## 2023-08-17 DIAGNOSIS — D46.9 MYELODYSPLASTIC SYNDROME: Primary | ICD-10-CM

## 2023-08-17 PROCEDURE — 25000003 PHARM REV CODE 250: Performed by: INTERNAL MEDICINE

## 2023-08-17 PROCEDURE — 96401 CHEMO ANTI-NEOPL SQ/IM: CPT

## 2023-08-17 PROCEDURE — 63600175 PHARM REV CODE 636 W HCPCS: Performed by: INTERNAL MEDICINE

## 2023-08-17 RX ORDER — AZACITIDINE 100 MG/1
75 INJECTION, POWDER, LYOPHILIZED, FOR SOLUTION INTRAVENOUS; SUBCUTANEOUS
Status: COMPLETED | OUTPATIENT
Start: 2023-08-17 | End: 2023-08-17

## 2023-08-17 RX ORDER — ONDANSETRON HYDROCHLORIDE 8 MG/1
16 TABLET, FILM COATED ORAL
Status: COMPLETED | OUTPATIENT
Start: 2023-08-17 | End: 2023-08-17

## 2023-08-17 RX ADMIN — ONDANSETRON HYDROCHLORIDE 16 MG: 8 TABLET, FILM COATED ORAL at 02:08

## 2023-08-17 RX ADMIN — AZACITIDINE 140 MG: 100 INJECTION, POWDER, LYOPHILIZED, FOR SOLUTION INTRAVENOUS; SUBCUTANEOUS at 03:08

## 2023-08-18 ENCOUNTER — INFUSION (OUTPATIENT)
Dept: INFUSION THERAPY | Facility: HOSPITAL | Age: 68
End: 2023-08-18
Attending: INTERNAL MEDICINE
Payer: MEDICARE

## 2023-08-18 VITALS
HEART RATE: 87 BPM | DIASTOLIC BLOOD PRESSURE: 73 MMHG | SYSTOLIC BLOOD PRESSURE: 124 MMHG | RESPIRATION RATE: 17 BRPM | HEIGHT: 69 IN | WEIGHT: 165.44 LBS | OXYGEN SATURATION: 96 % | TEMPERATURE: 98 F | BODY MASS INDEX: 24.5 KG/M2

## 2023-08-18 DIAGNOSIS — D46.9 MYELODYSPLASTIC SYNDROME: Primary | ICD-10-CM

## 2023-08-18 PROCEDURE — 25000003 PHARM REV CODE 250: Performed by: INTERNAL MEDICINE

## 2023-08-18 PROCEDURE — 96401 CHEMO ANTI-NEOPL SQ/IM: CPT

## 2023-08-18 PROCEDURE — 63600175 PHARM REV CODE 636 W HCPCS: Performed by: INTERNAL MEDICINE

## 2023-08-18 RX ORDER — ONDANSETRON HYDROCHLORIDE 8 MG/1
16 TABLET, FILM COATED ORAL
Status: COMPLETED | OUTPATIENT
Start: 2023-08-18 | End: 2023-08-18

## 2023-08-18 RX ORDER — AZACITIDINE 100 MG/1
75 INJECTION, POWDER, LYOPHILIZED, FOR SOLUTION INTRAVENOUS; SUBCUTANEOUS
Status: COMPLETED | OUTPATIENT
Start: 2023-08-18 | End: 2023-08-18

## 2023-08-18 RX ADMIN — AZACITIDINE 140 MG: 100 INJECTION, POWDER, LYOPHILIZED, FOR SOLUTION INTRAVENOUS; SUBCUTANEOUS at 03:08

## 2023-08-18 RX ADMIN — ONDANSETRON HYDROCHLORIDE 16 MG: 8 TABLET, FILM COATED ORAL at 02:08

## 2023-08-24 ENCOUNTER — LAB VISIT (OUTPATIENT)
Dept: LAB | Facility: HOSPITAL | Age: 68
End: 2023-08-24
Payer: MEDICARE

## 2023-08-24 DIAGNOSIS — D46.9 MYELODYSPLASTIC SYNDROME: ICD-10-CM

## 2023-08-24 LAB
ABO + RH BLD: NORMAL
ALBUMIN SERPL BCP-MCNC: 4.1 G/DL (ref 3.5–5.2)
ALP SERPL-CCNC: 73 U/L (ref 55–135)
ALT SERPL W/O P-5'-P-CCNC: 15 U/L (ref 10–44)
ANION GAP SERPL CALC-SCNC: 9 MMOL/L (ref 8–16)
AST SERPL-CCNC: 16 U/L (ref 10–40)
BASOPHILS # BLD AUTO: 0.01 K/UL (ref 0–0.2)
BASOPHILS NFR BLD: 0.3 % (ref 0–1.9)
BILIRUB SERPL-MCNC: 0.3 MG/DL (ref 0.1–1)
BLD GP AB SCN CELLS X3 SERPL QL: NORMAL
BUN SERPL-MCNC: 23 MG/DL (ref 8–23)
CALCIUM SERPL-MCNC: 9.5 MG/DL (ref 8.7–10.5)
CHLORIDE SERPL-SCNC: 106 MMOL/L (ref 95–110)
CO2 SERPL-SCNC: 25 MMOL/L (ref 23–29)
CREAT SERPL-MCNC: 1 MG/DL (ref 0.5–1.4)
DIFFERENTIAL METHOD: ABNORMAL
EOSINOPHIL # BLD AUTO: 0.1 K/UL (ref 0–0.5)
EOSINOPHIL NFR BLD: 3.9 % (ref 0–8)
ERYTHROCYTE [DISTWIDTH] IN BLOOD BY AUTOMATED COUNT: 22.1 % (ref 11.5–14.5)
EST. GFR  (NO RACE VARIABLE): >60 ML/MIN/1.73 M^2
GLUCOSE SERPL-MCNC: 97 MG/DL (ref 70–110)
HCT VFR BLD AUTO: 40.4 % (ref 40–54)
HGB BLD-MCNC: 12.8 G/DL (ref 14–18)
IMM GRANULOCYTES # BLD AUTO: 0 K/UL (ref 0–0.04)
IMM GRANULOCYTES NFR BLD AUTO: 0 % (ref 0–0.5)
LDH SERPL L TO P-CCNC: 143 U/L (ref 110–260)
LYMPHOCYTES # BLD AUTO: 1.2 K/UL (ref 1–4.8)
LYMPHOCYTES NFR BLD: 39.7 % (ref 18–48)
MCH RBC QN AUTO: 32.7 PG (ref 27–31)
MCHC RBC AUTO-ENTMCNC: 31.7 G/DL (ref 32–36)
MCV RBC AUTO: 103 FL (ref 82–98)
MONOCYTES # BLD AUTO: 0.2 K/UL (ref 0.3–1)
MONOCYTES NFR BLD: 6.9 % (ref 4–15)
NEUTROPHILS # BLD AUTO: 1.5 K/UL (ref 1.8–7.7)
NEUTROPHILS NFR BLD: 49.2 % (ref 38–73)
NRBC BLD-RTO: 1 /100 WBC
PLATELET # BLD AUTO: 137 K/UL (ref 150–450)
PMV BLD AUTO: 11.5 FL (ref 9.2–12.9)
POTASSIUM SERPL-SCNC: 4.2 MMOL/L (ref 3.5–5.1)
PROT SERPL-MCNC: 7.5 G/DL (ref 6–8.4)
RBC # BLD AUTO: 3.92 M/UL (ref 4.6–6.2)
SODIUM SERPL-SCNC: 140 MMOL/L (ref 136–145)
SPECIMEN OUTDATE: NORMAL
URATE SERPL-MCNC: 6 MG/DL (ref 3.4–7)
WBC # BLD AUTO: 3.05 K/UL (ref 3.9–12.7)

## 2023-08-24 PROCEDURE — 86900 BLOOD TYPING SEROLOGIC ABO: CPT | Performed by: INTERNAL MEDICINE

## 2023-08-24 PROCEDURE — 84550 ASSAY OF BLOOD/URIC ACID: CPT | Performed by: INTERNAL MEDICINE

## 2023-08-24 PROCEDURE — 80053 COMPREHEN METABOLIC PANEL: CPT | Performed by: INTERNAL MEDICINE

## 2023-08-24 PROCEDURE — 85025 COMPLETE CBC W/AUTO DIFF WBC: CPT | Performed by: INTERNAL MEDICINE

## 2023-08-24 PROCEDURE — 83615 LACTATE (LD) (LDH) ENZYME: CPT | Performed by: INTERNAL MEDICINE

## 2023-08-24 PROCEDURE — 36415 COLL VENOUS BLD VENIPUNCTURE: CPT | Performed by: INTERNAL MEDICINE

## 2023-08-28 ENCOUNTER — LAB VISIT (OUTPATIENT)
Dept: LAB | Facility: HOSPITAL | Age: 68
End: 2023-08-28
Payer: MEDICARE

## 2023-08-28 DIAGNOSIS — D46.9 MYELODYSPLASTIC SYNDROME: ICD-10-CM

## 2023-08-28 LAB
ABO + RH BLD: NORMAL
ALBUMIN SERPL BCP-MCNC: 4 G/DL (ref 3.5–5.2)
ALP SERPL-CCNC: 70 U/L (ref 55–135)
ALT SERPL W/O P-5'-P-CCNC: 16 U/L (ref 10–44)
ANION GAP SERPL CALC-SCNC: 9 MMOL/L (ref 8–16)
ANISOCYTOSIS BLD QL SMEAR: SLIGHT
AST SERPL-CCNC: 17 U/L (ref 10–40)
BASOPHILS NFR BLD: 0 % (ref 0–1.9)
BILIRUB SERPL-MCNC: 0.2 MG/DL (ref 0.1–1)
BLD GP AB SCN CELLS X3 SERPL QL: NORMAL
BUN SERPL-MCNC: 20 MG/DL (ref 8–23)
CALCIUM SERPL-MCNC: 9.4 MG/DL (ref 8.7–10.5)
CHLORIDE SERPL-SCNC: 105 MMOL/L (ref 95–110)
CO2 SERPL-SCNC: 24 MMOL/L (ref 23–29)
CREAT SERPL-MCNC: 1 MG/DL (ref 0.5–1.4)
DIFFERENTIAL METHOD: ABNORMAL
EOSINOPHIL NFR BLD: 4 % (ref 0–8)
ERYTHROCYTE [DISTWIDTH] IN BLOOD BY AUTOMATED COUNT: 21.8 % (ref 11.5–14.5)
EST. GFR  (NO RACE VARIABLE): >60 ML/MIN/1.73 M^2
GLUCOSE SERPL-MCNC: 138 MG/DL (ref 70–110)
HCT VFR BLD AUTO: 40.3 % (ref 40–54)
HGB BLD-MCNC: 13.2 G/DL (ref 14–18)
IMM GRANULOCYTES # BLD AUTO: ABNORMAL K/UL (ref 0–0.04)
IMM GRANULOCYTES NFR BLD AUTO: ABNORMAL % (ref 0–0.5)
LDH SERPL L TO P-CCNC: 124 U/L (ref 110–260)
LYMPHOCYTES NFR BLD: 67 % (ref 18–48)
MCH RBC QN AUTO: 33.5 PG (ref 27–31)
MCHC RBC AUTO-ENTMCNC: 32.8 G/DL (ref 32–36)
MCV RBC AUTO: 102 FL (ref 82–98)
MONOCYTES NFR BLD: 5 % (ref 4–15)
NEUTROPHILS NFR BLD: 23 % (ref 38–73)
NEUTS BAND NFR BLD MANUAL: 1 %
NRBC BLD-RTO: 0 /100 WBC
PLATELET # BLD AUTO: 100 K/UL (ref 150–450)
PLATELET BLD QL SMEAR: ABNORMAL
PMV BLD AUTO: 9.7 FL (ref 9.2–12.9)
POTASSIUM SERPL-SCNC: 4.4 MMOL/L (ref 3.5–5.1)
PROT SERPL-MCNC: 7.2 G/DL (ref 6–8.4)
RBC # BLD AUTO: 3.94 M/UL (ref 4.6–6.2)
SODIUM SERPL-SCNC: 138 MMOL/L (ref 136–145)
SPECIMEN OUTDATE: NORMAL
URATE SERPL-MCNC: 5.2 MG/DL (ref 3.4–7)
WBC # BLD AUTO: 2.4 K/UL (ref 3.9–12.7)

## 2023-08-28 PROCEDURE — 85027 COMPLETE CBC AUTOMATED: CPT | Performed by: INTERNAL MEDICINE

## 2023-08-28 PROCEDURE — 84550 ASSAY OF BLOOD/URIC ACID: CPT | Performed by: INTERNAL MEDICINE

## 2023-08-28 PROCEDURE — 83615 LACTATE (LD) (LDH) ENZYME: CPT | Performed by: INTERNAL MEDICINE

## 2023-08-28 PROCEDURE — 80053 COMPREHEN METABOLIC PANEL: CPT | Performed by: INTERNAL MEDICINE

## 2023-08-28 PROCEDURE — 86900 BLOOD TYPING SEROLOGIC ABO: CPT | Performed by: INTERNAL MEDICINE

## 2023-08-28 PROCEDURE — 36415 COLL VENOUS BLD VENIPUNCTURE: CPT | Performed by: INTERNAL MEDICINE

## 2023-08-28 PROCEDURE — 85007 BL SMEAR W/DIFF WBC COUNT: CPT | Performed by: INTERNAL MEDICINE

## 2023-08-31 ENCOUNTER — LAB VISIT (OUTPATIENT)
Dept: LAB | Facility: HOSPITAL | Age: 68
End: 2023-08-31
Attending: INTERNAL MEDICINE
Payer: MEDICARE

## 2023-08-31 DIAGNOSIS — D46.9 MYELODYSPLASTIC SYNDROME: ICD-10-CM

## 2023-08-31 LAB
ABO + RH BLD: NORMAL
ALBUMIN SERPL BCP-MCNC: 3.9 G/DL (ref 3.5–5.2)
ALP SERPL-CCNC: 67 U/L (ref 55–135)
ALT SERPL W/O P-5'-P-CCNC: 20 U/L (ref 10–44)
ANION GAP SERPL CALC-SCNC: 10 MMOL/L (ref 8–16)
ANISOCYTOSIS BLD QL SMEAR: SLIGHT
AST SERPL-CCNC: 20 U/L (ref 10–40)
BASOPHILS NFR BLD: 4 % (ref 0–1.9)
BILIRUB SERPL-MCNC: 0.3 MG/DL (ref 0.1–1)
BLD GP AB SCN CELLS X3 SERPL QL: NORMAL
BUN SERPL-MCNC: 16 MG/DL (ref 8–23)
CALCIUM SERPL-MCNC: 9.4 MG/DL (ref 8.7–10.5)
CHLORIDE SERPL-SCNC: 106 MMOL/L (ref 95–110)
CO2 SERPL-SCNC: 24 MMOL/L (ref 23–29)
CREAT SERPL-MCNC: 1 MG/DL (ref 0.5–1.4)
DIFFERENTIAL METHOD: ABNORMAL
EOSINOPHIL NFR BLD: 1 % (ref 0–8)
ERYTHROCYTE [DISTWIDTH] IN BLOOD BY AUTOMATED COUNT: 21.7 % (ref 11.5–14.5)
EST. GFR  (NO RACE VARIABLE): >60 ML/MIN/1.73 M^2
GLUCOSE SERPL-MCNC: 111 MG/DL (ref 70–110)
HCT VFR BLD AUTO: 40.5 % (ref 40–54)
HGB BLD-MCNC: 12.9 G/DL (ref 14–18)
IMM GRANULOCYTES # BLD AUTO: ABNORMAL K/UL (ref 0–0.04)
IMM GRANULOCYTES NFR BLD AUTO: ABNORMAL % (ref 0–0.5)
LDH SERPL L TO P-CCNC: 146 U/L (ref 110–260)
LYMPHOCYTES NFR BLD: 66 % (ref 18–48)
MCH RBC QN AUTO: 32.7 PG (ref 27–31)
MCHC RBC AUTO-ENTMCNC: 31.9 G/DL (ref 32–36)
MCV RBC AUTO: 103 FL (ref 82–98)
MONOCYTES NFR BLD: 2 % (ref 4–15)
NEUTROPHILS NFR BLD: 27 % (ref 38–73)
NRBC BLD-RTO: 0 /100 WBC
PLATELET # BLD AUTO: 97 K/UL (ref 150–450)
PLATELET BLD QL SMEAR: ABNORMAL
PMV BLD AUTO: 10.3 FL (ref 9.2–12.9)
POTASSIUM SERPL-SCNC: 4.3 MMOL/L (ref 3.5–5.1)
PROT SERPL-MCNC: 7 G/DL (ref 6–8.4)
RBC # BLD AUTO: 3.94 M/UL (ref 4.6–6.2)
SODIUM SERPL-SCNC: 140 MMOL/L (ref 136–145)
SPECIMEN OUTDATE: NORMAL
URATE SERPL-MCNC: 6.4 MG/DL (ref 3.4–7)
WBC # BLD AUTO: 2.27 K/UL (ref 3.9–12.7)

## 2023-08-31 PROCEDURE — 84550 ASSAY OF BLOOD/URIC ACID: CPT | Performed by: INTERNAL MEDICINE

## 2023-08-31 PROCEDURE — 86900 BLOOD TYPING SEROLOGIC ABO: CPT | Performed by: INTERNAL MEDICINE

## 2023-08-31 PROCEDURE — 85007 BL SMEAR W/DIFF WBC COUNT: CPT | Performed by: INTERNAL MEDICINE

## 2023-08-31 PROCEDURE — 36415 COLL VENOUS BLD VENIPUNCTURE: CPT | Performed by: INTERNAL MEDICINE

## 2023-08-31 PROCEDURE — 83615 LACTATE (LD) (LDH) ENZYME: CPT | Performed by: INTERNAL MEDICINE

## 2023-08-31 PROCEDURE — 80053 COMPREHEN METABOLIC PANEL: CPT | Performed by: INTERNAL MEDICINE

## 2023-08-31 PROCEDURE — 85027 COMPLETE CBC AUTOMATED: CPT | Performed by: INTERNAL MEDICINE

## 2023-09-05 ENCOUNTER — LAB VISIT (OUTPATIENT)
Dept: LAB | Facility: HOSPITAL | Age: 68
End: 2023-09-05
Attending: INTERNAL MEDICINE
Payer: MEDICARE

## 2023-09-05 ENCOUNTER — TELEPHONE (OUTPATIENT)
Dept: FAMILY MEDICINE | Facility: HOSPITAL | Age: 68
End: 2023-09-05
Payer: MEDICARE

## 2023-09-05 DIAGNOSIS — D46.9 MYELODYSPLASTIC SYNDROME: ICD-10-CM

## 2023-09-05 LAB
ABO + RH BLD: NORMAL
ALBUMIN SERPL BCP-MCNC: 3.9 G/DL (ref 3.5–5.2)
ALP SERPL-CCNC: 59 U/L (ref 55–135)
ALT SERPL W/O P-5'-P-CCNC: 19 U/L (ref 10–44)
ANION GAP SERPL CALC-SCNC: 17 MMOL/L (ref 8–16)
ANISOCYTOSIS BLD QL SMEAR: SLIGHT
AST SERPL-CCNC: 17 U/L (ref 10–40)
BASOPHILS NFR BLD: 6 % (ref 0–1.9)
BILIRUB SERPL-MCNC: 0.3 MG/DL (ref 0.1–1)
BLD GP AB SCN CELLS X3 SERPL QL: NORMAL
BUN SERPL-MCNC: 18 MG/DL (ref 8–23)
CALCIUM SERPL-MCNC: 9.5 MG/DL (ref 8.7–10.5)
CHLORIDE SERPL-SCNC: 105 MMOL/L (ref 95–110)
CO2 SERPL-SCNC: 20 MMOL/L (ref 23–29)
CREAT SERPL-MCNC: 1.1 MG/DL (ref 0.5–1.4)
DIFFERENTIAL METHOD: ABNORMAL
EOSINOPHIL NFR BLD: 0 % (ref 0–8)
ERYTHROCYTE [DISTWIDTH] IN BLOOD BY AUTOMATED COUNT: 21.6 % (ref 11.5–14.5)
EST. GFR  (NO RACE VARIABLE): >60 ML/MIN/1.73 M^2
GLUCOSE SERPL-MCNC: 131 MG/DL (ref 70–110)
HCT VFR BLD AUTO: 41.1 % (ref 40–54)
HGB BLD-MCNC: 13.3 G/DL (ref 14–18)
IMM GRANULOCYTES # BLD AUTO: ABNORMAL K/UL (ref 0–0.04)
IMM GRANULOCYTES NFR BLD AUTO: ABNORMAL % (ref 0–0.5)
LDH SERPL L TO P-CCNC: 118 U/L (ref 110–260)
LYMPHOCYTES NFR BLD: 71 % (ref 18–48)
MCH RBC QN AUTO: 33.6 PG (ref 27–31)
MCHC RBC AUTO-ENTMCNC: 32.4 G/DL (ref 32–36)
MCV RBC AUTO: 104 FL (ref 82–98)
MONOCYTES NFR BLD: 1 % (ref 4–15)
NEUTROPHILS NFR BLD: 21 % (ref 38–73)
NEUTS BAND NFR BLD MANUAL: 1 %
NRBC BLD-RTO: 0 /100 WBC
PLATELET # BLD AUTO: 139 K/UL (ref 150–450)
PLATELET BLD QL SMEAR: ABNORMAL
PMV BLD AUTO: 11.5 FL (ref 9.2–12.9)
POLYCHROMASIA BLD QL SMEAR: ABNORMAL
POTASSIUM SERPL-SCNC: 3.9 MMOL/L (ref 3.5–5.1)
PROT SERPL-MCNC: 7 G/DL (ref 6–8.4)
RBC # BLD AUTO: 3.96 M/UL (ref 4.6–6.2)
SODIUM SERPL-SCNC: 142 MMOL/L (ref 136–145)
SPECIMEN OUTDATE: NORMAL
URATE SERPL-MCNC: 5.9 MG/DL (ref 3.4–7)
WBC # BLD AUTO: 1.91 K/UL (ref 3.9–12.7)

## 2023-09-05 PROCEDURE — 86900 BLOOD TYPING SEROLOGIC ABO: CPT | Performed by: INTERNAL MEDICINE

## 2023-09-05 PROCEDURE — 84550 ASSAY OF BLOOD/URIC ACID: CPT | Performed by: INTERNAL MEDICINE

## 2023-09-05 PROCEDURE — 85027 COMPLETE CBC AUTOMATED: CPT | Performed by: INTERNAL MEDICINE

## 2023-09-05 PROCEDURE — 85007 BL SMEAR W/DIFF WBC COUNT: CPT | Performed by: INTERNAL MEDICINE

## 2023-09-05 PROCEDURE — 80053 COMPREHEN METABOLIC PANEL: CPT | Performed by: INTERNAL MEDICINE

## 2023-09-05 PROCEDURE — 83615 LACTATE (LD) (LDH) ENZYME: CPT | Performed by: INTERNAL MEDICINE

## 2023-09-05 NOTE — TELEPHONE ENCOUNTER
Justyn from [facility] called with a critical lab value of WBC-1.91. High Priority message sent to Dr. Harry Diamond and staff. Value entered into Flowsheets.

## 2023-09-06 ENCOUNTER — PROCEDURE VISIT (OUTPATIENT)
Dept: HEMATOLOGY/ONCOLOGY | Facility: CLINIC | Age: 68
End: 2023-09-06
Payer: MEDICARE

## 2023-09-06 VITALS
DIASTOLIC BLOOD PRESSURE: 75 MMHG | TEMPERATURE: 98 F | RESPIRATION RATE: 16 BRPM | OXYGEN SATURATION: 95 % | SYSTOLIC BLOOD PRESSURE: 135 MMHG | HEART RATE: 78 BPM

## 2023-09-06 DIAGNOSIS — D46.9 MYELODYSPLASTIC SYNDROME: Primary | ICD-10-CM

## 2023-09-06 PROCEDURE — 85097 BONE MARROW INTERPRETATION: CPT | Mod: ,,, | Performed by: PATHOLOGY

## 2023-09-06 PROCEDURE — 88299 UNLISTED CYTOGENETIC STUDY: CPT | Performed by: NURSE PRACTITIONER

## 2023-09-06 PROCEDURE — 88185 FLOWCYTOMETRY/TC ADD-ON: CPT | Mod: 59 | Performed by: PATHOLOGY

## 2023-09-06 PROCEDURE — 88341 IMHCHEM/IMCYTCHM EA ADD ANTB: CPT | Mod: 26,,, | Performed by: PATHOLOGY

## 2023-09-06 PROCEDURE — 88342 IMHCHEM/IMCYTCHM 1ST ANTB: CPT | Performed by: PATHOLOGY

## 2023-09-06 PROCEDURE — 88313 SPECIAL STAINS GROUP 2: CPT | Performed by: PATHOLOGY

## 2023-09-06 PROCEDURE — 88365 PR  TISSUE HYBRIDIZATION: ICD-10-PCS | Mod: 26,,, | Performed by: PATHOLOGY

## 2023-09-06 PROCEDURE — 85097 PR  BONE MARROW,SMEAR INTERPRETATION: ICD-10-PCS | Mod: ,,, | Performed by: PATHOLOGY

## 2023-09-06 PROCEDURE — 88305 TISSUE EXAM BY PATHOLOGIST: ICD-10-PCS | Mod: 26,,, | Performed by: PATHOLOGY

## 2023-09-06 PROCEDURE — 88184 FLOWCYTOMETRY/ TC 1 MARKER: CPT | Performed by: PATHOLOGY

## 2023-09-06 PROCEDURE — 88189 FLOWCYTOMETRY/READ 16 & >: CPT | Mod: ,,, | Performed by: PATHOLOGY

## 2023-09-06 PROCEDURE — 88305 TISSUE EXAM BY PATHOLOGIST: CPT | Mod: 26,,, | Performed by: PATHOLOGY

## 2023-09-06 PROCEDURE — 99499 UNLISTED E&M SERVICE: CPT | Mod: S$GLB,,, | Performed by: NURSE PRACTITIONER

## 2023-09-06 PROCEDURE — 88365 INSITU HYBRIDIZATION (FISH): CPT | Mod: 26,,, | Performed by: PATHOLOGY

## 2023-09-06 PROCEDURE — 88341 IMHCHEM/IMCYTCHM EA ADD ANTB: CPT | Mod: 59 | Performed by: PATHOLOGY

## 2023-09-06 PROCEDURE — 88364 CHG INSITU HYBRIDIZATION (FISH: ICD-10-PCS | Mod: 26,,, | Performed by: PATHOLOGY

## 2023-09-06 PROCEDURE — 88311 DECALCIFY TISSUE: CPT | Performed by: PATHOLOGY

## 2023-09-06 PROCEDURE — 88342 IMHCHEM/IMCYTCHM 1ST ANTB: CPT | Mod: 26,59,, | Performed by: PATHOLOGY

## 2023-09-06 PROCEDURE — 88364 INSITU HYBRIDIZATION (FISH): CPT | Mod: 26,,, | Performed by: PATHOLOGY

## 2023-09-06 PROCEDURE — 88237 TISSUE CULTURE BONE MARROW: CPT | Performed by: INTERNAL MEDICINE

## 2023-09-06 PROCEDURE — 88189 PR  FLOWCYTOMETRY/READ, 16 & > MARKERS: ICD-10-PCS | Mod: ,,, | Performed by: PATHOLOGY

## 2023-09-06 PROCEDURE — 88341 PR IHC OR ICC EACH ADD'L SINGLE ANTIBODY  STAINPR: ICD-10-PCS | Mod: 26,,, | Performed by: PATHOLOGY

## 2023-09-06 PROCEDURE — 88264 CHROMOSOME ANALYSIS 20-25: CPT | Performed by: INTERNAL MEDICINE

## 2023-09-06 PROCEDURE — 88364 INSITU HYBRIDIZATION (FISH): CPT | Mod: 59 | Performed by: PATHOLOGY

## 2023-09-06 PROCEDURE — 88311 DECALCIFY TISSUE: CPT | Mod: 26,,, | Performed by: PATHOLOGY

## 2023-09-06 PROCEDURE — 88365 INSITU HYBRIDIZATION (FISH): CPT | Performed by: PATHOLOGY

## 2023-09-06 PROCEDURE — 36415 COLL VENOUS BLD VENIPUNCTURE: CPT | Performed by: INTERNAL MEDICINE

## 2023-09-06 PROCEDURE — 88313 PR  SPECIAL STAINS,GROUP II: ICD-10-PCS | Mod: 26,,, | Performed by: PATHOLOGY

## 2023-09-06 PROCEDURE — 38222 BONE MARROW: ICD-10-PCS | Mod: LT,,, | Performed by: NURSE PRACTITIONER

## 2023-09-06 PROCEDURE — 38222 DX BONE MARROW BX & ASPIR: CPT | Mod: LT,,, | Performed by: NURSE PRACTITIONER

## 2023-09-06 PROCEDURE — 88311 PR  DECALCIFY TISSUE: ICD-10-PCS | Mod: 26,,, | Performed by: PATHOLOGY

## 2023-09-06 PROCEDURE — 88271 CYTOGENETICS DNA PROBE: CPT | Performed by: INTERNAL MEDICINE

## 2023-09-06 PROCEDURE — 81450 HL NEO GSAP 5-50DNA/DNA&RNA: CPT | Performed by: NURSE PRACTITIONER

## 2023-09-06 PROCEDURE — 99499 RISK ADDL DX/OHS AUDIT: ICD-10-PCS | Mod: S$GLB,,, | Performed by: NURSE PRACTITIONER

## 2023-09-06 PROCEDURE — 88313 SPECIAL STAINS GROUP 2: CPT | Mod: 26,,, | Performed by: PATHOLOGY

## 2023-09-06 PROCEDURE — 88342 CHG IMMUNOCYTOCHEMISTRY: ICD-10-PCS | Mod: 26,59,, | Performed by: PATHOLOGY

## 2023-09-06 PROCEDURE — 88305 TISSUE EXAM BY PATHOLOGIST: CPT | Performed by: PATHOLOGY

## 2023-09-06 NOTE — PROCEDURES
Bone marrow    Date/Time: 9/6/2023 10:15 AM    Performed by: Estefany Cartagena NP  Authorized by: Mohit Hickey MD    Aspiration?: Yes   Biopsy?: Yes    PROCEDURE NOTE:  Date of Procedure: 09/06/2023   Bone Marrow Biopsy and Aspiration  Indication: MDS  Consent: Informed consent was obtained from patient.  Timeout: Done and documented.  Position: Prone  Site: Left posterior illiac crest.  Prep: Betadine.  Needle used: 11 gauge Jamshidi needle.  Anesthetic: 2% lidocaine 5 cc.  Biopsy: The biopsy needle was introduced into the marrow cavity and an aspirate was obtained without complications and sent for flow cytometry, MDS, NGS, and cytogenetics. Core biopsy obtained without difficulty and sent for routine histologic examination.  Complications: None.  Disposition: Left patient with primary nurse,instructed to lay on back for 20 minutes. Advised not to take shower and keep dressing clean, dry & intact for 24 hours.  Blood loss: Minimal.     Estefany Cartagena NP  Hematology/Oncology/BMT

## 2023-09-06 NOTE — PROCEDURES
Patient with MDS presented at BMT clinic for restaging bone marrow biopsy. Tolerated procedure well. Not in any distress.  See 's  note  for full history. Reviewed medications, allergies and labs.     Estefany Cartagena NP  Hematology/Oncology/BMT

## 2023-09-07 ENCOUNTER — LAB VISIT (OUTPATIENT)
Dept: LAB | Facility: HOSPITAL | Age: 68
End: 2023-09-07
Payer: MEDICARE

## 2023-09-07 ENCOUNTER — DOCUMENTATION ONLY (OUTPATIENT)
Dept: HEMATOLOGY/ONCOLOGY | Facility: CLINIC | Age: 68
End: 2023-09-07
Payer: MEDICARE

## 2023-09-07 DIAGNOSIS — D46.9 MYELODYSPLASTIC SYNDROME: ICD-10-CM

## 2023-09-07 LAB
ABO + RH BLD: NORMAL
ALBUMIN SERPL BCP-MCNC: 3.8 G/DL (ref 3.5–5.2)
ALP SERPL-CCNC: 64 U/L (ref 55–135)
ALT SERPL W/O P-5'-P-CCNC: 19 U/L (ref 10–44)
ANION GAP SERPL CALC-SCNC: 8 MMOL/L (ref 8–16)
ANISOCYTOSIS BLD QL SMEAR: SLIGHT
AST SERPL-CCNC: 16 U/L (ref 10–40)
BASOPHILS # BLD AUTO: 0.06 K/UL (ref 0–0.2)
BASOPHILS NFR BLD: 3.3 % (ref 0–1.9)
BILIRUB SERPL-MCNC: 0.4 MG/DL (ref 0.1–1)
BLD GP AB SCN CELLS X3 SERPL QL: NORMAL
BODY SITE - BONE MARROW: NORMAL
BUN SERPL-MCNC: 17 MG/DL (ref 8–23)
CALCIUM SERPL-MCNC: 9.5 MG/DL (ref 8.7–10.5)
CHLORIDE SERPL-SCNC: 104 MMOL/L (ref 95–110)
CLINICAL DIAGNOSIS - BONE MARROW: NORMAL
CO2 SERPL-SCNC: 28 MMOL/L (ref 23–29)
CREAT SERPL-MCNC: 1.2 MG/DL (ref 0.5–1.4)
DACRYOCYTES BLD QL SMEAR: ABNORMAL
DIFFERENTIAL METHOD: ABNORMAL
EOSINOPHIL # BLD AUTO: 0 K/UL (ref 0–0.5)
EOSINOPHIL NFR BLD: 2.2 % (ref 0–8)
ERYTHROCYTE [DISTWIDTH] IN BLOOD BY AUTOMATED COUNT: 21.3 % (ref 11.5–14.5)
EST. GFR  (NO RACE VARIABLE): >60 ML/MIN/1.73 M^2
FLOW CYTOMETRY ANTIBODIES ANALYZED - BONE MARROW: NORMAL
FLOW CYTOMETRY COMMENT - BONE MARROW: NORMAL
FLOW CYTOMETRY INTERPRETATION - BONE MARROW: NORMAL
GLUCOSE SERPL-MCNC: 126 MG/DL (ref 70–110)
HCT VFR BLD AUTO: 41.1 % (ref 40–54)
HGB BLD-MCNC: 13.1 G/DL (ref 14–18)
HYPOCHROMIA BLD QL SMEAR: ABNORMAL
IMM GRANULOCYTES # BLD AUTO: 0 K/UL (ref 0–0.04)
IMM GRANULOCYTES NFR BLD AUTO: 0 % (ref 0–0.5)
LDH SERPL L TO P-CCNC: 120 U/L (ref 110–260)
LYMPHOCYTES # BLD AUTO: 1.3 K/UL (ref 1–4.8)
LYMPHOCYTES NFR BLD: 71 % (ref 18–48)
MCH RBC QN AUTO: 32.6 PG (ref 27–31)
MCHC RBC AUTO-ENTMCNC: 31.9 G/DL (ref 32–36)
MCV RBC AUTO: 102 FL (ref 82–98)
MONOCYTES # BLD AUTO: 0.1 K/UL (ref 0.3–1)
MONOCYTES NFR BLD: 3.3 % (ref 4–15)
NEUTROPHILS # BLD AUTO: 0.4 K/UL (ref 1.8–7.7)
NEUTROPHILS NFR BLD: 20.2 % (ref 38–73)
NRBC BLD-RTO: 1 /100 WBC
OVALOCYTES BLD QL SMEAR: ABNORMAL
PLATELET # BLD AUTO: 167 K/UL (ref 150–450)
PLATELET BLD QL SMEAR: ABNORMAL
PMV BLD AUTO: 11.3 FL (ref 9.2–12.9)
POIKILOCYTOSIS BLD QL SMEAR: SLIGHT
POLYCHROMASIA BLD QL SMEAR: ABNORMAL
POTASSIUM SERPL-SCNC: 4.3 MMOL/L (ref 3.5–5.1)
PROT SERPL-MCNC: 7.2 G/DL (ref 6–8.4)
RBC # BLD AUTO: 4.02 M/UL (ref 4.6–6.2)
SODIUM SERPL-SCNC: 140 MMOL/L (ref 136–145)
SPECIMEN OUTDATE: NORMAL
URATE SERPL-MCNC: 6.1 MG/DL (ref 3.4–7)
WBC # BLD AUTO: 1.83 K/UL (ref 3.9–12.7)

## 2023-09-07 PROCEDURE — 80053 COMPREHEN METABOLIC PANEL: CPT | Performed by: INTERNAL MEDICINE

## 2023-09-07 PROCEDURE — 85025 COMPLETE CBC W/AUTO DIFF WBC: CPT | Performed by: INTERNAL MEDICINE

## 2023-09-07 PROCEDURE — 83615 LACTATE (LD) (LDH) ENZYME: CPT | Performed by: INTERNAL MEDICINE

## 2023-09-07 PROCEDURE — 84550 ASSAY OF BLOOD/URIC ACID: CPT | Performed by: INTERNAL MEDICINE

## 2023-09-07 PROCEDURE — 86900 BLOOD TYPING SEROLOGIC ABO: CPT | Performed by: INTERNAL MEDICINE

## 2023-09-07 NOTE — PROGRESS NOTES
PATIENT: Guillaume Salinas  MRN: [id number]  DATE: 9/8/2023    Diagnosis:   1. 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 Immunodeficiency secondary to neoplasm    4. Anemia due to antineoplastic chemotherapy    5. Chemotherapy-induced neutropenia    6. Chemotherapy-induced nausea and vomiting      Chief Complaint: myelodysplastic syndrome    Oncologic History:      Oncologic History Myelodysplastic syndrome      Oncologic Treatment Azacitidine/venetoclax (start date 6/12/23).      Pathology 7/3/23:  BONE MARROW ASPIRATE, TOUCH PREP, CLOT, AND DECALCIFIED NEEDLE CORE BIOPSY:   LEFT POSTEROSUPERIOR ILIAC CREST   - MORPHOLOGIC AND IMMUNOPHENOTYPIC FEATURES OF PERSISTENT MDS   - LIMITED, SUBOPTIMAL SPECIMEN: Findings may not be entirely representative   - Hypocellular marrow (20% total cellularity) with no increased CD34+ blasts, chemotherapeutic changes, and scant residual trilineage hypoplasia and dyspoiesis with increased numbers of micromegakaryocytes   - Relative lymphocytosis including small, well-defined lymphoid aggregates (favor benign/reactive)   - Relative polytypic plasmacytosis (5-10%) with morphologically unremarkable plasma cells   - Mildly increased stainable histiocytic iron stores (4+ out of 6+)     5/23/23:  LEFT ILIAC CREST BONE MARROW ASPIRATE, BONE MARROW CLOT, AND BONE MARROW CORE BIOPSY WITH:     CELLULARITY= 90-95%, MYELOID PREDOMINANCE (M/E= 7:1).   CONSISTENT WITH MYELODYSPLASTIC SYNDROME WITH EXCESS BLASTS-2 (16-17%).  SEE COMMENT.   ADEQUATE STORAGE IRON.   INCREASED NUMBER OF MEGAKARYOCYTES WITH DYSPLASTIC MORPHOLOGY.       4/26/23:  Bone marrow, left iliac crest, aspirate, clot, and core biopsy:   --Hypercellular marrow for age, 80-90% with trilineage maturation showing increased blasts and small megakaryocytic forms, most compatible with a primary marrow neoplasm, favor myelodysplasia with excess blasts (MDS-EB-2) with impending evolution, final    classification pending correlation with cytogenetic and molecular studies, see comment   --Stainable iron is not increased   --Mild reticulin fibrosis          Bone marrow, left iliac crest, aspirate, clot, and core biopsy:   --Hypercellular marrow for age, 80-90% with trilineage maturation showing increased blasts and small megakaryocytic forms, most compatible with a primary marrow neoplasm, favor myelodysplasia with excess blasts (MDS-EB-2) with impending evolution, final   classification pending correlation with cytogenetic and molecular studies, see comment   --Stainable iron is not increased   --Mild reticulin fibrosis     Comment:  Concomitantly submitted flow cytometric analysis detects a mildly increased myeloblast population, concerning for a primary marrow process.  The blast gate is increased with approximately 8% CD38/CD34 positive blasts identified.  Populations of    B lymphocytes are polyclonal and T lymphocytes are immunophenotypically unremarkable.     This study is somewhat limited by lack of cellularity on aspirate smears with mild reticulin fibrosis noted.  However, there are increased CD34 positive blasts, counted at 12% on the immunohistochemical stain with increased megakaryocytes showing many micromegakaryocytic forms.  The morphology in conjunction with peripheral cytopenias is compatible with a primary marrow neoplasm, highly suggestive of myelodysplasia with excess blasts (MDS-EB-2) although an evolving acute leukemia is of concern.     Correlation with clinical findings and any available cytogenetic and molecular studies is required for further characterization.              Subjective:    History of Present Illness: Mr. Betsy Salinas is a 68 y.o. male who presented in April 2023 for evaluation and management of anemia.    [I do not see evidence of anemia in his labs, but he was referred for anemia workup.]    - he went to [place] for a dental procedure. He had several teeth  "removed ("an intense procedure per his wife"). He had taken antibiotics/amoxicillin and ibuprofen. He had the second part of procedure about a week later. He noted severe fatigue, weakness.  - he underwent bone marrow biopsy on 4/26/23.  - he met with Dr. Hickey on 5/8/23. He recommended repeat bone marrow biopsy.  - he underwent bone marrow biopsy on 5/23/23.  - he met with Dr. Hickey on 5/30/23. 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 CG/FISH/NGS pending. I reviewed with him and his family the aggressive nature of this disease, including natural history, prognosis, and treatment options. I recommended treatment with azacitidine 75 mg/m2 daily x7 days plus venetoclax 100mg (posa) daily x21 of 28 days. He was in agreement. Consent signed today.   Azacitidine plus venetoclax x21 of 28 days as above. Taz ramp up ordered.  Repeat bone marrow biopsy at the end of cycle 1. Orders placed.     Stem cell transplant candidate: We briefly discussed allogeneic stem cell transplantation in MDS. He will need transplant in CR1. Will plan for further discussion of stem cell transplant and meeting with transplant coordinators at next follow up in 2-3 weeks.   Will send HLA typing with next labs."    - he underwent bone marrow biopsy on 7/3/23    - he met with Dr. Hickey on 6/20/23. Information per note:  "HCT-CI of 1 (intermediate risk). Will plan to proceed with transplant in the next 2-3 months (as soon as a donor is identified). HLA typing sent."    - he saw Dr. Hickey on 7/11/23. Per his note: "will plan to proceed with transplant in the next 2-3 months (as soon as a donor is identified)."    Interval history:  - he presents for a follow-up appointment for his myelodysplastic syndrome.  - he saw Dr. Hickey on 8/15/23. Per his note:  " Will plan to proceed with transplant ASAP (as soon as a donor is identified). HLA typing sent."  - he underwent bone marrow biopsy on " 9/6/23.    - he is scheduled to start cycle #4 of azacitidine/venetoclax on 9/18/23   - today, he is doing well. He endorses fatigue. He denies shortness of breath, chest pain, diarrhea, constipation.        Past medical, surgical, family, and social histories have been reviewed and updated below.    Past Medical History:   Past Medical History:   Diagnosis Date    Anticoagulant long-term use     Coronary artery disease     Hypertension     Peripheral vascular disease, unspecified        Past Surgical History:   Past Surgical History:   Procedure Laterality Date    BONE MARROW BIOPSY Left 4/26/2023    Procedure: Biopsy-bone marrow;  Surgeon: Harry Diamond MD;  Location: [facility] OR;  Service: Oncology;  Laterality: Left;    COLONOSCOPY N/A 01/05/2022    Procedure: COLONOSCOPY Golytely Vaccinated will bring cards;  Surgeon: Dereje Simon MD;  Location: [facility] ENDO;  Service: Endoscopy;  Laterality: N/A;  Do not cancel this order       Family History:   Family History   Problem Relation Age of Onset    Hypertension Mother     Cancer Father     Cancer Brother     No Known Problems Daughter     No Known Problems Daughter     No Known Problems Son        Social History:  reports that he quit smoking about 6 months ago. His smoking use included cigarettes. He started smoking about 50 years ago. He has a 12.5 pack-year smoking history. He has never used smokeless tobacco. He reports that he does not currently use alcohol. He reports that he does not use drugs.    Allergies:  Review of patient's allergies indicates:  No Known Allergies    Medications:  Current Outpatient Medications   Medication Sig Dispense Refill    acyclovir (ZOVIRAX) 400 MG tablet Take 1 tablet (400 mg total) by mouth 2 (two) times daily. 60 tablet 11    aspirin (ECOTRIN) 81 MG EC tablet Take 1 tablet (81 mg total) by mouth once daily. 30 tablet 12    atorvastatin (LIPITOR) 80 MG tablet Take 1 tablet (80 mg total) by mouth once daily. 90 tablet 3     carvediloL (COREG) 6.25 MG tablet TAKE 1 TABLET(6.25 MG) BY MOUTH TWICE DAILY WITH MEALS 180 tablet 3    cilostazoL (PLETAL) 50 MG Tab Take 1 tablet (50 mg total) by mouth 2 (two) times daily. 180 tablet 3    docusate sodium (COLACE) 100 MG capsule Take 100 mg by mouth 2 (two) times daily as needed for Constipation.      gabapentin (NEURONTIN) 300 MG capsule Take 1 capsule (300 mg total) by mouth 3 (three) times daily. 90 capsule 11    levoFLOXacin (LEVAQUIN) 500 MG tablet Take 1 tablet (500 mg total) by mouth once daily. 30 tablet 11    promethazine (PHENERGAN) 25 MG tablet Take 1 tablet (25 mg total) by mouth every 8 (eight) hours as needed for Nausea. 40 tablet 5    venetoclax (VENCLEXTA) 100 mg Tab Take 1 tablet (100 mg) by mouth once daily on days 1-7 of a 28-day cycle. Do not discard any remaining tablets. 28 tablet 11    voriconazole (VFEND) 200 MG Tab Take 1 tablet (200 mg total) by mouth 2 (two) times daily. 60 tablet 11    zolpidem (AMBIEN) 10 mg Tab Take 10 mg by mouth nightly as needed.       No current facility-administered medications for this visit.       Review of Systems   Constitutional:  Positive for fatigue.   HENT:  Negative for sore throat.    Eyes:  Negative for visual disturbance.   Respiratory:  Negative for shortness of breath.    Cardiovascular:  Negative for chest pain.   Gastrointestinal:  Positive for nausea. Negative for abdominal pain.   Genitourinary:  Negative for dysuria.   Musculoskeletal:  Negative for back pain.   Skin:  Negative for rash.   Neurological:  Negative for weakness and headaches.   Hematological:  Negative for adenopathy.   Psychiatric/Behavioral:  The patient is not nervous/anxious.        ECOG Performance Status:   ECOG SCORE    1 - Restricted in strenuous activity-ambulatory and able to carry out work of a light nature         Objective:      Vitals:   Vitals:    09/08/23 1023   BP: 105/65   BP Location: Right arm   Patient Position: Sitting   BP Method:  Medium (Automatic)   Pulse: 73   Resp: 18   SpO2: 98%   Weight: 74.2 kg (163 lb 9.3 oz)     BMI: Body mass index is 24.16 kg/m².    Physical Exam  Vitals and nursing note reviewed.   Constitutional:       Appearance: He is well-developed.   HENT:      Head: Normocephalic and atraumatic.   Eyes:      Pupils: Pupils are equal, round, and reactive to light.   Cardiovascular:      Rate and Rhythm: Normal rate and regular rhythm.   Pulmonary:      Effort: Pulmonary effort is normal.      Breath sounds: Normal breath sounds.   Abdominal:      General: Bowel sounds are normal.      Palpations: Abdomen is soft.   Musculoskeletal:         General: Normal range of motion.      Cervical back: Normal range of motion and neck supple.   Skin:     General: Skin is warm and dry.   Neurological:      Mental Status: He is alert and oriented to person, place, and time.   Psychiatric:         Behavior: Behavior normal.         Thought Content: Thought content normal.         Judgment: Judgment normal.         Laboratory Data:  Labs have been reviewed.    Lab Results   Component Value Date    WBC 1.91 (LL) 09/05/2023    HGB 13.3 (L) 09/05/2023    HCT 41.1 09/05/2023     (H) 09/05/2023     (L) 09/05/2023       Imaging:        Assessment:       1. 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 Immunodeficiency secondary to neoplasm    4. Anemia due to antineoplastic chemotherapy    5. Chemotherapy-induced neutropenia    6. Chemotherapy-induced nausea and vomiting           Plan:     Myelodysplastic syndrome / anemia due to chemo / neutropenia due to chemo.  - bone marrow biopsy (4/26/23) revealed myelodysplastic syndrome.  - he met with Dr. Hickey on 5/8/23. He recommended repeat bone marrow biopsy.  - bone marrow biopsy (5/23/23) revealed myelodysplastic syndrome with excess blasts.  - he met with Dr. Hickey on 5/30/23. He recommended azacitidine (7-day) with venetoclax.  - he has received  "several blood transfusions in June 2023  - he met with Dr. Hickey on 6/20/23. Information per note:  "HCT-CI of 1 (intermediate risk). Will plan to proceed with transplant in the next 2-3 months (as soon as a donor is identified). HLA typing sent."  - bone marrow biopsy (7/3/23) revealed no increased blasts! He will follow up with Dr. Hickey on 7/11/23  - he has received blood/platelets on 7/5/23.  - he has not received blood/platelet transfusion in several weeks.  - he saw Dr. Hickey on 8/15/23. Per his note:  " Will plan to proceed with transplant ASAP (as soon as a donor is identified). HLA typing sent."  - he underwent bone marrow biopsy on 9/6/23. Follow up results.  - change vidaza to 5 days, and venetoclax from 14 days to 7 days every 28 days (due to cytopenias.  - Labs have been reviewed. Absolute neutrophil count is 0.36 k/uL.  - check labs on 9/18/23. Okay to proceed with vidaza on that day. If ANC improves, he can take ventoclax that day as well.  - continue anti-infectious therapy per Dr. Hickey's plan.  - return to clinic in one month.    2. Chemotherapy-induced nausea/vomiting  - mild symptoms. Continue phenergan as needed.    3. Advance Care Planning     Power of   After our discussion (at previous visit), the patient has decided not to complete a HCPOA.       - return to clinic in one month.    Harry Diamond M.D.  Hematology/Oncology  [facility] - [address], [address]  [place] [address]  Phone: [phone]  Fax: [phone]  "

## 2023-09-08 ENCOUNTER — OFFICE VISIT (OUTPATIENT)
Dept: HEMATOLOGY/ONCOLOGY | Facility: CLINIC | Age: 68
End: 2023-09-08
Payer: MEDICARE

## 2023-09-08 VITALS
BODY MASS INDEX: 24.16 KG/M2 | WEIGHT: 163.56 LBS | OXYGEN SATURATION: 98 % | HEART RATE: 73 BPM | SYSTOLIC BLOOD PRESSURE: 105 MMHG | RESPIRATION RATE: 18 BRPM | DIASTOLIC BLOOD PRESSURE: 65 MMHG

## 2023-09-08 DIAGNOSIS — Z79.899 IMMUNODEFICIENCY DUE TO DRUG THERAPY: ICD-10-CM

## 2023-09-08 DIAGNOSIS — D46.9 MYELODYSPLASTIC SYNDROME: Primary | ICD-10-CM

## 2023-09-08 DIAGNOSIS — T45.1X5A ANEMIA DUE TO ANTINEOPLASTIC CHEMOTHERAPY: ICD-10-CM

## 2023-09-08 DIAGNOSIS — T45.1X5A CHEMOTHERAPY-INDUCED NAUSEA AND VOMITING: ICD-10-CM

## 2023-09-08 DIAGNOSIS — T45.1X5A CHEMOTHERAPY-INDUCED NEUTROPENIA: ICD-10-CM

## 2023-09-08 DIAGNOSIS — D84.821 IMMUNODEFICIENCY DUE TO DRUG THERAPY: ICD-10-CM

## 2023-09-08 DIAGNOSIS — D84.81 IMMUNODEFICIENCY SECONDARY TO NEOPLASM: ICD-10-CM

## 2023-09-08 DIAGNOSIS — D64.81 ANEMIA DUE TO ANTINEOPLASTIC CHEMOTHERAPY: ICD-10-CM

## 2023-09-08 DIAGNOSIS — D49.9 IMMUNODEFICIENCY SECONDARY TO NEOPLASM: ICD-10-CM

## 2023-09-08 DIAGNOSIS — D70.1 CHEMOTHERAPY-INDUCED NEUTROPENIA: ICD-10-CM

## 2023-09-08 DIAGNOSIS — R11.2 CHEMOTHERAPY-INDUCED NAUSEA AND VOMITING: ICD-10-CM

## 2023-09-08 LAB
DNA/RNA EXTRACT AND HOLD RESULT: NORMAL
DNA/RNA EXTRACTION: NORMAL
EXHR SPECIMEN TYPE: NORMAL

## 2023-09-08 PROCEDURE — 3078F PR MOST RECENT DIASTOLIC BLOOD PRESSURE < 80 MM HG: ICD-10-PCS | Mod: CPTII,S$GLB,, | Performed by: INTERNAL MEDICINE

## 2023-09-08 PROCEDURE — 1160F PR REVIEW ALL MEDS BY PRESCRIBER/CLIN PHARMACIST DOCUMENTED: ICD-10-PCS | Mod: CPTII,S$GLB,, | Performed by: INTERNAL MEDICINE

## 2023-09-08 PROCEDURE — 99999 PR PBB SHADOW E&M-EST. PATIENT-LVL III: ICD-10-PCS | Mod: PBBFAC,,, | Performed by: INTERNAL MEDICINE

## 2023-09-08 PROCEDURE — 1101F PR PT FALLS ASSESS DOC 0-1 FALLS W/OUT INJ PAST YR: ICD-10-PCS | Mod: CPTII,S$GLB,, | Performed by: INTERNAL MEDICINE

## 2023-09-08 PROCEDURE — 1101F PT FALLS ASSESS-DOCD LE1/YR: CPT | Mod: CPTII,S$GLB,, | Performed by: INTERNAL MEDICINE

## 2023-09-08 PROCEDURE — 3074F SYST BP LT 130 MM HG: CPT | Mod: CPTII,S$GLB,, | Performed by: INTERNAL MEDICINE

## 2023-09-08 PROCEDURE — 99999 PR PBB SHADOW E&M-EST. PATIENT-LVL III: CPT | Mod: PBBFAC,,, | Performed by: INTERNAL MEDICINE

## 2023-09-08 PROCEDURE — 1159F PR MEDICATION LIST DOCUMENTED IN MEDICAL RECORD: ICD-10-PCS | Mod: CPTII,S$GLB,, | Performed by: INTERNAL MEDICINE

## 2023-09-08 PROCEDURE — 3008F PR BODY MASS INDEX (BMI) DOCUMENTED: ICD-10-PCS | Mod: CPTII,S$GLB,, | Performed by: INTERNAL MEDICINE

## 2023-09-08 PROCEDURE — 1126F AMNT PAIN NOTED NONE PRSNT: CPT | Mod: CPTII,S$GLB,, | Performed by: INTERNAL MEDICINE

## 2023-09-08 PROCEDURE — 1126F PR PAIN SEVERITY QUANTIFIED, NO PAIN PRESENT: ICD-10-PCS | Mod: CPTII,S$GLB,, | Performed by: INTERNAL MEDICINE

## 2023-09-08 PROCEDURE — 3078F DIAST BP <80 MM HG: CPT | Mod: CPTII,S$GLB,, | Performed by: INTERNAL MEDICINE

## 2023-09-08 PROCEDURE — 3288F FALL RISK ASSESSMENT DOCD: CPT | Mod: CPTII,S$GLB,, | Performed by: INTERNAL MEDICINE

## 2023-09-08 PROCEDURE — 1160F RVW MEDS BY RX/DR IN RCRD: CPT | Mod: CPTII,S$GLB,, | Performed by: INTERNAL MEDICINE

## 2023-09-08 PROCEDURE — 99215 PR OFFICE/OUTPT VISIT, EST, LEVL V, 40-54 MIN: ICD-10-PCS | Mod: S$GLB,,, | Performed by: INTERNAL MEDICINE

## 2023-09-08 PROCEDURE — 3008F BODY MASS INDEX DOCD: CPT | Mod: CPTII,S$GLB,, | Performed by: INTERNAL MEDICINE

## 2023-09-08 PROCEDURE — 3074F PR MOST RECENT SYSTOLIC BLOOD PRESSURE < 130 MM HG: ICD-10-PCS | Mod: CPTII,S$GLB,, | Performed by: INTERNAL MEDICINE

## 2023-09-08 PROCEDURE — 99215 OFFICE O/P EST HI 40 MIN: CPT | Mod: S$GLB,,, | Performed by: INTERNAL MEDICINE

## 2023-09-08 PROCEDURE — 3288F PR FALLS RISK ASSESSMENT DOCUMENTED: ICD-10-PCS | Mod: CPTII,S$GLB,, | Performed by: INTERNAL MEDICINE

## 2023-09-08 PROCEDURE — 1159F MED LIST DOCD IN RCRD: CPT | Mod: CPTII,S$GLB,, | Performed by: INTERNAL MEDICINE

## 2023-09-08 RX ORDER — ONDANSETRON HYDROCHLORIDE 8 MG/1
16 TABLET, FILM COATED ORAL
Status: CANCELLED | OUTPATIENT
Start: 2023-09-21

## 2023-09-08 RX ORDER — AZACITIDINE 100 MG/1
75 INJECTION, POWDER, LYOPHILIZED, FOR SOLUTION INTRAVENOUS; SUBCUTANEOUS
Status: CANCELLED | OUTPATIENT
Start: 2023-09-22

## 2023-09-08 RX ORDER — AZACITIDINE 100 MG/1
75 INJECTION, POWDER, LYOPHILIZED, FOR SOLUTION INTRAVENOUS; SUBCUTANEOUS
Status: CANCELLED | OUTPATIENT
Start: 2023-09-21

## 2023-09-08 RX ORDER — AZACITIDINE 100 MG/1
75 INJECTION, POWDER, LYOPHILIZED, FOR SOLUTION INTRAVENOUS; SUBCUTANEOUS
Status: CANCELLED | OUTPATIENT
Start: 2023-09-18

## 2023-09-08 RX ORDER — AZACITIDINE 100 MG/1
75 INJECTION, POWDER, LYOPHILIZED, FOR SOLUTION INTRAVENOUS; SUBCUTANEOUS
Status: CANCELLED | OUTPATIENT
Start: 2023-09-20

## 2023-09-08 RX ORDER — ONDANSETRON HYDROCHLORIDE 8 MG/1
16 TABLET, FILM COATED ORAL
Status: CANCELLED | OUTPATIENT
Start: 2023-09-19

## 2023-09-08 RX ORDER — ONDANSETRON HYDROCHLORIDE 8 MG/1
16 TABLET, FILM COATED ORAL
Status: CANCELLED | OUTPATIENT
Start: 2023-09-18

## 2023-09-08 RX ORDER — ONDANSETRON HYDROCHLORIDE 8 MG/1
16 TABLET, FILM COATED ORAL
Status: CANCELLED | OUTPATIENT
Start: 2023-09-22

## 2023-09-08 RX ORDER — AZACITIDINE 100 MG/1
75 INJECTION, POWDER, LYOPHILIZED, FOR SOLUTION INTRAVENOUS; SUBCUTANEOUS
Status: CANCELLED | OUTPATIENT
Start: 2023-09-19

## 2023-09-08 RX ORDER — ONDANSETRON HYDROCHLORIDE 8 MG/1
16 TABLET, FILM COATED ORAL
Status: CANCELLED | OUTPATIENT
Start: 2023-09-20

## 2023-09-11 LAB
CHROM BANDING METHOD: NORMAL
CHROMOSOME ANALYSIS BM ADDITIONAL INFORMATION: NORMAL
CHROMOSOME ANALYSIS BM RELEASED BY: NORMAL
CHROMOSOME ANALYSIS BM RESULT SUMMARY: NORMAL
CLINICAL CYTOGENETICIST REVIEW: NORMAL
CLINICAL CYTOGENETICIST REVIEW: NORMAL
FMDS SPECIMEN: NORMAL
KARYOTYP MAR: NORMAL
MDS FISH ADDITIONAL INFORMATION: NORMAL
MDS FISH DISCLAIMER: NORMAL
MDS FISH REASON FOR REFERRAL (BM): NORMAL
MDS FISH RELEASED BY: NORMAL
MDS FISH RESULT (BM): NORMAL
MDS FISH RESULT TABLE: NORMAL
MOL DX INTERP BLD/T QL: NORMAL
REASON FOR REFERRAL (NARRATIVE): NORMAL
REF LAB TEST METHOD: NORMAL
REF LAB TEST METHOD: NORMAL
SPECIMEN SOURCE: NORMAL
SPECIMEN SOURCE: NORMAL
SPECIMEN: NORMAL

## 2023-09-12 ENCOUNTER — OFFICE VISIT (OUTPATIENT)
Dept: HEMATOLOGY/ONCOLOGY | Facility: CLINIC | Age: 68
End: 2023-09-12
Payer: MEDICARE

## 2023-09-12 VITALS
BODY MASS INDEX: 24.99 KG/M2 | HEART RATE: 76 BPM | SYSTOLIC BLOOD PRESSURE: 122 MMHG | HEIGHT: 69 IN | OXYGEN SATURATION: 97 % | TEMPERATURE: 98 F | DIASTOLIC BLOOD PRESSURE: 71 MMHG | WEIGHT: 168.75 LBS

## 2023-09-12 DIAGNOSIS — Z79.899 IMMUNODEFICIENCY DUE TO DRUG THERAPY: ICD-10-CM

## 2023-09-12 DIAGNOSIS — D46.9 MYELODYSPLASTIC SYNDROME: ICD-10-CM

## 2023-09-12 DIAGNOSIS — D84.821 IMMUNODEFICIENCY DUE TO DRUG THERAPY: ICD-10-CM

## 2023-09-12 DIAGNOSIS — D61.818 PANCYTOPENIA: ICD-10-CM

## 2023-09-12 DIAGNOSIS — Z76.82 STEM CELL TRANSPLANT CANDIDATE: Primary | ICD-10-CM

## 2023-09-12 PROCEDURE — 1160F RVW MEDS BY RX/DR IN RCRD: CPT | Mod: CPTII,S$GLB,, | Performed by: INTERNAL MEDICINE

## 2023-09-12 PROCEDURE — 1160F PR REVIEW ALL MEDS BY PRESCRIBER/CLIN PHARMACIST DOCUMENTED: ICD-10-PCS | Mod: CPTII,S$GLB,, | Performed by: INTERNAL MEDICINE

## 2023-09-12 PROCEDURE — 3078F DIAST BP <80 MM HG: CPT | Mod: CPTII,S$GLB,, | Performed by: INTERNAL MEDICINE

## 2023-09-12 PROCEDURE — 3074F PR MOST RECENT SYSTOLIC BLOOD PRESSURE < 130 MM HG: ICD-10-PCS | Mod: CPTII,S$GLB,, | Performed by: INTERNAL MEDICINE

## 2023-09-12 PROCEDURE — 99999 PR PBB SHADOW E&M-EST. PATIENT-LVL III: CPT | Mod: PBBFAC,,, | Performed by: INTERNAL MEDICINE

## 2023-09-12 PROCEDURE — 1126F AMNT PAIN NOTED NONE PRSNT: CPT | Mod: CPTII,S$GLB,, | Performed by: INTERNAL MEDICINE

## 2023-09-12 PROCEDURE — 3074F SYST BP LT 130 MM HG: CPT | Mod: CPTII,S$GLB,, | Performed by: INTERNAL MEDICINE

## 2023-09-12 PROCEDURE — 1126F PR PAIN SEVERITY QUANTIFIED, NO PAIN PRESENT: ICD-10-PCS | Mod: CPTII,S$GLB,, | Performed by: INTERNAL MEDICINE

## 2023-09-12 PROCEDURE — 3008F PR BODY MASS INDEX (BMI) DOCUMENTED: ICD-10-PCS | Mod: CPTII,S$GLB,, | Performed by: INTERNAL MEDICINE

## 2023-09-12 PROCEDURE — 1101F PR PT FALLS ASSESS DOC 0-1 FALLS W/OUT INJ PAST YR: ICD-10-PCS | Mod: CPTII,S$GLB,, | Performed by: INTERNAL MEDICINE

## 2023-09-12 PROCEDURE — 3008F BODY MASS INDEX DOCD: CPT | Mod: CPTII,S$GLB,, | Performed by: INTERNAL MEDICINE

## 2023-09-12 PROCEDURE — 1159F MED LIST DOCD IN RCRD: CPT | Mod: CPTII,S$GLB,, | Performed by: INTERNAL MEDICINE

## 2023-09-12 PROCEDURE — 1101F PT FALLS ASSESS-DOCD LE1/YR: CPT | Mod: CPTII,S$GLB,, | Performed by: INTERNAL MEDICINE

## 2023-09-12 PROCEDURE — 1159F PR MEDICATION LIST DOCUMENTED IN MEDICAL RECORD: ICD-10-PCS | Mod: CPTII,S$GLB,, | Performed by: INTERNAL MEDICINE

## 2023-09-12 PROCEDURE — 99215 PR OFFICE/OUTPT VISIT, EST, LEVL V, 40-54 MIN: ICD-10-PCS | Mod: S$GLB,,, | Performed by: INTERNAL MEDICINE

## 2023-09-12 PROCEDURE — 3288F PR FALLS RISK ASSESSMENT DOCUMENTED: ICD-10-PCS | Mod: CPTII,S$GLB,, | Performed by: INTERNAL MEDICINE

## 2023-09-12 PROCEDURE — 99215 OFFICE O/P EST HI 40 MIN: CPT | Mod: S$GLB,,, | Performed by: INTERNAL MEDICINE

## 2023-09-12 PROCEDURE — 3078F PR MOST RECENT DIASTOLIC BLOOD PRESSURE < 80 MM HG: ICD-10-PCS | Mod: CPTII,S$GLB,, | Performed by: INTERNAL MEDICINE

## 2023-09-12 PROCEDURE — 3288F FALL RISK ASSESSMENT DOCD: CPT | Mod: CPTII,S$GLB,, | Performed by: INTERNAL MEDICINE

## 2023-09-12 PROCEDURE — 99999 PR PBB SHADOW E&M-EST. PATIENT-LVL III: ICD-10-PCS | Mod: PBBFAC,,, | Performed by: INTERNAL MEDICINE

## 2023-09-12 NOTE — PROGRESS NOTES
Section of Hematology and Stem Cell Transplantation  Follow Up Visit     Date of visit: 9/12/23  Visit diagnosis: Stem cell transplant candidate [Z76.82]  Referred by:  Harry Diamond MD    Oncologic History:     Primary Oncologic Diagnosis: Myelodysplastic syndrome excess blasts 2, IPSS-M very high risk    4/12/23: Initial evaluation by Dr. Diamond of anemia. WBC 2.71, Hgb 7.1, Plts  400. Prior to this visit, he was in [place] for a dental procedure. His symptoms of fatigue started after extractions. Workup by Dr. Diamond unremarkable.   4/26/23: Bone marrow biopsy revealed a hypercellular marrow (80-90%) with increased blasts (8-12%) concerning for MDS EB2. However, sample was limited.   5/23/23: Repeat bone marrow biopsy revealed myelodysplastic syndrome with excess blasts 2 (blasts 16-17%). 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 NGS with ASXL1 (44%), EZH2 (87%), IDH2 (42%), STAG2 (89%), U2AF1 (3%).  6/12/23: Cycle 1 of azacitidine 75 mg/m2 plus venetoclax x14 of 28 days.   7/3/23: Bone marrow biopsy at the end of cycle 1 revealed a hypocellular marrow, no blasts, trilineage hypoplasia and dyspoiesis with increased micromegakaryocytes. FISH with trisomy 8 (three copies of [id number]). NGS with ASXL1 (20%), EZH2 (40%), IDH2 (17%), STAG2 (38%).  9/6/23: Bone marrow biopsy revealed dysplastic changes but blasts were not increased. Diploid karyotype.     History of Present Ilness:   Guillaume Salinas (Guillaume) is a pleasant 68 y.o.male with PVD, CAD, HTN who presents for follow up. He is tolerating treatment well. No new side effects or symptoms.     Patient was seen today in conjunction with a .    PAST MEDICAL HISTORY:   Past Medical History:   Diagnosis Date    Anticoagulant long-term use     Coronary artery disease     Hypertension     Peripheral vascular disease, unspecified        PAST SURGICAL HISTORY:   Past Surgical History:   Procedure Laterality Date    BONE MARROW BIOPSY Left  2023    Procedure: Biopsy-bone marrow;  Surgeon: Harry Diamond MD;  Location: [facility] OR;  Service: Oncology;  Laterality: Left;    COLONOSCOPY N/A 2022    Procedure: COLONOSCOPY Golytely Vaccinated will bring cards;  Surgeon: Dereje Simon MD;  Location: [facility] ENDO;  Service: Endoscopy;  Laterality: N/A;  Do not cancel this order       PAST SOCIAL HISTORY:  Social History     Tobacco Use    Smoking status: Former     Current packs/day: 0.00     Average packs/day: 0.3 packs/day for 50.0 years (12.5 ttl pk-yrs)     Types: Cigarettes     Start date: 3/1/1973     Quit date: 3/1/2023     Years since quittin.5    Smokeless tobacco: Never   Substance Use Topics    Alcohol use: Not Currently    Drug use: Never       FAMILY HISTORY:  Family History   Problem Relation Age of Onset    Hypertension Mother     Cancer Father     Cancer Brother     No Known Problems Daughter     No Known Problems Daughter     No Known Problems Son        CURRENT MEDICATIONS:   Current Outpatient Medications   Medication Sig    acyclovir (ZOVIRAX) 400 MG tablet Take 1 tablet (400 mg total) by mouth 2 (two) times daily.    aspirin (ECOTRIN) 81 MG EC tablet Take 1 tablet (81 mg total) by mouth once daily.    atorvastatin (LIPITOR) 80 MG tablet Take 1 tablet (80 mg total) by mouth once daily.    carvediloL (COREG) 6.25 MG tablet TAKE 1 TABLET(6.25 MG) BY MOUTH TWICE DAILY WITH MEALS    cilostazoL (PLETAL) 50 MG Tab Take 1 tablet (50 mg total) by mouth 2 (two) times daily.    docusate sodium (COLACE) 100 MG capsule Take 100 mg by mouth 2 (two) times daily as needed for Constipation.    gabapentin (NEURONTIN) 300 MG capsule Take 1 capsule (300 mg total) by mouth 3 (three) times daily.    levoFLOXacin (LEVAQUIN) 500 MG tablet Take 1 tablet (500 mg total) by mouth once daily.    promethazine (PHENERGAN) 25 MG tablet Take 1 tablet (25 mg total) by mouth every 8 (eight) hours as needed for Nausea.    venetoclax (VENCLEXTA) 100 mg  Tab Take 1 tablet (100 mg) by mouth once daily on days 1-7 of a 28-day cycle. Do not discard any remaining tablets.    voriconazole (VFEND) 200 MG Tab Take 1 tablet (200 mg total) by mouth 2 (two) times daily.    zolpidem (AMBIEN) 10 mg Tab Take 10 mg by mouth nightly as needed.     No current facility-administered medications for this visit.       ALLERGIES:   Review of patient's allergies indicates:  No Known Allergies    Review of Systems:     Pertinent positives and negatives included in the HPI. Otherwise a complete review of systems is negative.    Physical Exam:     Vitals:    09/12/23 0847   BP: 122/71   Pulse: 76   Temp: 98.4 °F (36.9 °C)     General: Appears well, NAD  Pulmonary: CTAB, no increased work of breathing, no W/R/C  Cardiovascular: S1S2 normal, RRR, no M/R/G  Abdominal: Soft, NT, ND, BS+, no HSM  Extremities: No C/C/E  Neurological: AAOx4, grossly normal, no focal deficits  Dermatologic: No appreciable rashes or lesions  Lymphatic: No cervical, axillary, or inguinal lymphadenopathy     ECOG Performance Status: (foot note - ECOG PS provided by [employer] Co[employer]cology Group) 1 - Symptomatic but completely ambulatory    Karnofsky Performance Score:  80%- Normal Activity with Effort: Some Symptoms of Disease    Labs:   Lab Results   Component Value Date    WBC 1.83 (LL) 09/07/2023    RBC 4.02 (L) 09/07/2023    HGB 13.1 (L) 09/07/2023    HCT 41.1 09/07/2023     (H) 09/07/2023    MCH 32.6 (H) 09/07/2023    MCHC 31.9 (L) 09/07/2023    RDW 21.3 (H) 09/07/2023     09/07/2023    MPV 11.3 09/07/2023    GRAN 0.4 (L) 09/07/2023    GRAN 20.2 (L) 09/07/2023    LYMPH 1.3 09/07/2023    LYMPH 71.0 (H) 09/07/2023    MONO 0.1 (L) 09/07/2023    MONO 3.3 (L) 09/07/2023    EOS 0.0 09/07/2023    BASO 0.06 09/07/2023    EOSINOPHIL 2.2 09/07/2023    BASOPHIL 3.3 (H) 09/07/2023       CMP  Sodium   Date Value Ref Range Status   09/07/2023 140 136 - 145 mmol/L Final     Potassium   Date Value Ref Range  Status   09/07/2023 4.3 3.5 - 5.1 mmol/L Final     Chloride   Date Value Ref Range Status   09/07/2023 104 95 - 110 mmol/L Final     CO2   Date Value Ref Range Status   09/07/2023 28 23 - 29 mmol/L Final     Glucose   Date Value Ref Range Status   09/07/2023 126 (H) 70 - 110 mg/dL Final     BUN   Date Value Ref Range Status   09/07/2023 17 8 - 23 mg/dL Final     Creatinine   Date Value Ref Range Status   09/07/2023 1.2 0.5 - 1.4 mg/dL Final     Calcium   Date Value Ref Range Status   09/07/2023 9.5 8.7 - 10.5 mg/dL Final     Total Protein   Date Value Ref Range Status   09/07/2023 7.2 6.0 - 8.4 g/dL Final     Albumin   Date Value Ref Range Status   09/07/2023 3.8 3.5 - 5.2 g/dL Final     Total Bilirubin   Date Value Ref Range Status   09/07/2023 0.4 0.1 - 1.0 mg/dL Final     Comment:     For infants and newborns, interpretation of results should be based  on gestational age, weight and in agreement with clinical  observations.    Premature Infant recommended reference ranges:  Up to 24 hours.............<8.0 mg/dL  Up to 48 hours............<12.0 mg/dL  3-5 days..................<15.0 mg/dL  6-29 days.................<15.0 mg/dL       Alkaline Phosphatase   Date Value Ref Range Status   09/07/2023 64 55 - 135 U/L Final     AST   Date Value Ref Range Status   09/07/2023 16 10 - 40 U/L Final     ALT   Date Value Ref Range Status   09/07/2023 19 10 - 44 U/L Final     Anion Gap   Date Value Ref Range Status   09/07/2023 8 8 - 16 mmol/L Final     eGFR if    Date Value Ref Range Status   08/27/2021 >60 >60 mL/min/1.73 m^2 Final   08/27/2021 >60 >60 mL/min/1.73 m^2 Final   08/27/2021 >60 >60 mL/min/1.73 m^2 Final     eGFR if non    Date Value Ref Range Status   08/27/2021 57 (A) >60 mL/min/1.73 m^2 Final     Comment:     Calculation used to obtain the estimated glomerular filtration  rate (eGFR) is the CKD-EPI equation.      08/27/2021 57 (A) >60 mL/min/1.73 m^2 Final     Comment:      Calculation used to obtain the estimated glomerular filtration  rate (eGFR) is the CKD-EPI equation.      08/27/2021 57 (A) >60 mL/min/1.73 m^2 Final     Comment:     Calculation used to obtain the estimated glomerular filtration  rate (eGFR) is the CKD-EPI equation.          Imaging:   No results found for this or any previous visit (from the past 2160 hour(s)).    Pathology:  Reviewed     Assessment and Plan:   Guillaume Salinas (Guillaume) is a pleasant 68 y.o.male with PVD, CAD, HTN who presents for follow up.    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 NGS with ASXL1 (44%), EZH2 (87%), IDH2 (42%), STAG2 (89%), U2AF1 (3%). He started treatment with azacitidine 75 mg/m2 daily x7 days plus venetoclax 100mg (voriconazole) daily x14 of 28 days. Venetoclax decreased to 7 days with cycle 3. Bone marrow biopsy 9/6/23 showe.  Continue azacitidine x5 days plus venetoclax x7 of 28 days as above.   Prior to starting each cycle - ANC >500 and Plts >50.    Stem cell transplant candidate: We discussed the role for allogeneic stem cell transplantation in this disease process as a potentially curative option. We had an extensive discussion about the rationale, logistics, risks, and benefits. We reviewed the requirement to stay in the [place] area for 100 days with a caregiver at all times. We discussed the risks, including infection, graft failure, organ toxicity, graft versus host disease, relapse of disease, and secondary cancers. We reviewed the need for long-term immunosuppression and need for close monitoring. HCT-CI of 1 (intermediate risk). Will plan to proceed with transplant ASAP (as soon as a donor is identified). Awaiting HLA results.  Coordinator: Fay Stephens  Regimen: pending donor  Donor: pending  Graft source: pending donor    Stem cell donors: 10 siblings (9 are over age 65). One sister (age 63) - Radha. Cesar (age 43),  Padmini Rush (age 39), Susy (age 35).   No fully matched MUDs available.   [facility] children available.   Awaiting sister's HLA results.     Symptomatic anemia: Related to MDS. Twice weekly monitoring in [place]. Transfuse for Hgb <7.    Thrombocytopenia: Related to MDS. Monitor and transfuse for Plts <10.    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     Orders/Follow Up:           Route Chart for Scheduling    BMT Chart Routing      Follow up with physician 6 weeks.   Follow up with [facility]    Provider visit type    Infusion scheduling note    Injection scheduling note    Labs    Imaging    Pharmacy appointment    Other referrals            Treatment Plan Information   OP AZACITIDINE 7-DAY (SUB-Q)   Harry Diamond MD   Upcoming Treatment Dates - OP AZACITIDINE 7-DAY (SUB-Q)    9/18/2023       Pre-Medications       ondansetron tablet 16 mg       Chemotherapy       azaCITIDine (VIDAZA) chemo injection 140 mg  9/19/2023       Pre-Medications       ondansetron tablet 16 mg       Chemotherapy       azaCITIDine (VIDAZA) chemo injection 140 mg  9/20/2023       Pre-Medications       ondansetron tablet 16 mg       Chemotherapy       azaCITIDine (VIDAZA) chemo injection 140 mg  9/21/2023       Pre-Medications       ondansetron tablet 16 mg       Chemotherapy       azaCITIDine (VIDAZA) chemo injection 140 mg    Advance Care Planning   Date: 05/08/2023  We reviewed his underlying diagnosis including natural history, prognosis, and various treatment options. He remains interested in pursuing any and all treatment options in an effort to improve his quality and quantity of life. Will discuss treatment options pending biopsy results.        Total time of this visit was 40 minutes, including time spent face to face with patient, and also in preparing for today's visit for MDM and documentation. (Medical Decision Making, including consideration of possible diagnoses, management options,  complex medical record review, review of diagnostic tests and information, consideration and discussion of significant complications based on comorbidities, and discussion with providers involved with the care of the patient). Greater than 50% was spent face to face with the patient counseling and coordinating care.    Paco Hickey MD  Hematology, Oncology, and Stem Cell Transplantation  [facility]

## 2023-09-18 ENCOUNTER — INFUSION (OUTPATIENT)
Dept: INFUSION THERAPY | Facility: HOSPITAL | Age: 68
End: 2023-09-18
Attending: INTERNAL MEDICINE
Payer: MEDICARE

## 2023-09-18 VITALS
RESPIRATION RATE: 18 BRPM | SYSTOLIC BLOOD PRESSURE: 122 MMHG | OXYGEN SATURATION: 97 % | BODY MASS INDEX: 24.75 KG/M2 | DIASTOLIC BLOOD PRESSURE: 74 MMHG | WEIGHT: 167.13 LBS | TEMPERATURE: 98 F | HEART RATE: 89 BPM | HEIGHT: 69 IN

## 2023-09-18 DIAGNOSIS — D46.9 MYELODYSPLASTIC SYNDROME: Primary | ICD-10-CM

## 2023-09-18 PROCEDURE — 96401 CHEMO ANTI-NEOPL SQ/IM: CPT

## 2023-09-18 PROCEDURE — 25000003 PHARM REV CODE 250: Performed by: INTERNAL MEDICINE

## 2023-09-18 PROCEDURE — 63600175 PHARM REV CODE 636 W HCPCS: Performed by: INTERNAL MEDICINE

## 2023-09-18 RX ORDER — ONDANSETRON HYDROCHLORIDE 8 MG/1
16 TABLET, FILM COATED ORAL
Status: COMPLETED | OUTPATIENT
Start: 2023-09-18 | End: 2023-09-18

## 2023-09-18 RX ORDER — AZACITIDINE 100 MG/1
75 INJECTION, POWDER, LYOPHILIZED, FOR SOLUTION INTRAVENOUS; SUBCUTANEOUS
Status: COMPLETED | OUTPATIENT
Start: 2023-09-18 | End: 2023-09-18

## 2023-09-18 RX ADMIN — ONDANSETRON HYDROCHLORIDE 16 MG: 8 TABLET, FILM COATED ORAL at 01:09

## 2023-09-18 RX ADMIN — AZACITIDINE 140 MG: 100 INJECTION, POWDER, LYOPHILIZED, FOR SOLUTION INTRAVENOUS; SUBCUTANEOUS at 02:09

## 2023-09-18 NOTE — NURSING
Pt tolerated Vidaza injections well, no complaints at this time. No adverse reactions noted. Next appointment scheduled and pt d/c in no acute distress.

## 2023-09-19 ENCOUNTER — INFUSION (OUTPATIENT)
Dept: INFUSION THERAPY | Facility: HOSPITAL | Age: 68
End: 2023-09-19
Attending: INTERNAL MEDICINE
Payer: MEDICARE

## 2023-09-19 VITALS
SYSTOLIC BLOOD PRESSURE: 117 MMHG | RESPIRATION RATE: 18 BRPM | TEMPERATURE: 98 F | BODY MASS INDEX: 24.75 KG/M2 | WEIGHT: 167.13 LBS | OXYGEN SATURATION: 95 % | HEART RATE: 81 BPM | DIASTOLIC BLOOD PRESSURE: 74 MMHG | HEIGHT: 69 IN

## 2023-09-19 DIAGNOSIS — D46.9 MYELODYSPLASTIC SYNDROME: Primary | ICD-10-CM

## 2023-09-19 PROCEDURE — 63600175 PHARM REV CODE 636 W HCPCS: Performed by: INTERNAL MEDICINE

## 2023-09-19 PROCEDURE — 96401 CHEMO ANTI-NEOPL SQ/IM: CPT

## 2023-09-19 PROCEDURE — 25000003 PHARM REV CODE 250: Performed by: INTERNAL MEDICINE

## 2023-09-19 RX ORDER — AZACITIDINE 100 MG/1
75 INJECTION, POWDER, LYOPHILIZED, FOR SOLUTION INTRAVENOUS; SUBCUTANEOUS
Status: COMPLETED | OUTPATIENT
Start: 2023-09-19 | End: 2023-09-19

## 2023-09-19 RX ORDER — ONDANSETRON HYDROCHLORIDE 8 MG/1
16 TABLET, FILM COATED ORAL
Status: COMPLETED | OUTPATIENT
Start: 2023-09-19 | End: 2023-09-19

## 2023-09-19 RX ADMIN — ONDANSETRON HYDROCHLORIDE 16 MG: 8 TABLET, FILM COATED ORAL at 01:09

## 2023-09-19 RX ADMIN — AZACITIDINE 140 MG: 100 INJECTION, POWDER, LYOPHILIZED, FOR SOLUTION INTRAVENOUS; SUBCUTANEOUS at 02:09

## 2023-09-20 ENCOUNTER — INFUSION (OUTPATIENT)
Dept: INFUSION THERAPY | Facility: HOSPITAL | Age: 68
End: 2023-09-20
Attending: INTERNAL MEDICINE
Payer: MEDICARE

## 2023-09-20 VITALS
WEIGHT: 167.13 LBS | BODY MASS INDEX: 24.75 KG/M2 | OXYGEN SATURATION: 93 % | RESPIRATION RATE: 18 BRPM | DIASTOLIC BLOOD PRESSURE: 68 MMHG | HEART RATE: 78 BPM | HEIGHT: 69 IN | SYSTOLIC BLOOD PRESSURE: 115 MMHG | TEMPERATURE: 98 F

## 2023-09-20 DIAGNOSIS — D46.9 MYELODYSPLASTIC SYNDROME: Primary | ICD-10-CM

## 2023-09-20 PROCEDURE — 96401 CHEMO ANTI-NEOPL SQ/IM: CPT

## 2023-09-20 PROCEDURE — 63600175 PHARM REV CODE 636 W HCPCS: Performed by: INTERNAL MEDICINE

## 2023-09-20 PROCEDURE — 25000003 PHARM REV CODE 250: Performed by: INTERNAL MEDICINE

## 2023-09-20 RX ORDER — ONDANSETRON HYDROCHLORIDE 8 MG/1
16 TABLET, FILM COATED ORAL
Status: COMPLETED | OUTPATIENT
Start: 2023-09-20 | End: 2023-09-20

## 2023-09-20 RX ORDER — AZACITIDINE 100 MG/1
75 INJECTION, POWDER, LYOPHILIZED, FOR SOLUTION INTRAVENOUS; SUBCUTANEOUS
Status: COMPLETED | OUTPATIENT
Start: 2023-09-20 | End: 2023-09-20

## 2023-09-20 RX ADMIN — AZACITIDINE 140 MG: 100 INJECTION, POWDER, LYOPHILIZED, FOR SOLUTION INTRAVENOUS; SUBCUTANEOUS at 02:09

## 2023-09-20 RX ADMIN — ONDANSETRON HYDROCHLORIDE 16 MG: 8 TABLET, FILM COATED ORAL at 02:09

## 2023-09-21 ENCOUNTER — INFUSION (OUTPATIENT)
Dept: INFUSION THERAPY | Facility: HOSPITAL | Age: 68
End: 2023-09-21
Attending: INTERNAL MEDICINE
Payer: MEDICARE

## 2023-09-21 VITALS
OXYGEN SATURATION: 97 % | HEIGHT: 69 IN | DIASTOLIC BLOOD PRESSURE: 75 MMHG | WEIGHT: 167.13 LBS | SYSTOLIC BLOOD PRESSURE: 132 MMHG | BODY MASS INDEX: 24.75 KG/M2 | TEMPERATURE: 99 F | HEART RATE: 79 BPM | RESPIRATION RATE: 17 BRPM

## 2023-09-21 DIAGNOSIS — D46.9 MYELODYSPLASTIC SYNDROME: Primary | ICD-10-CM

## 2023-09-21 PROCEDURE — 25000003 PHARM REV CODE 250: Performed by: INTERNAL MEDICINE

## 2023-09-21 PROCEDURE — 96401 CHEMO ANTI-NEOPL SQ/IM: CPT

## 2023-09-21 PROCEDURE — 63600175 PHARM REV CODE 636 W HCPCS: Performed by: INTERNAL MEDICINE

## 2023-09-21 RX ORDER — ONDANSETRON HYDROCHLORIDE 8 MG/1
16 TABLET, FILM COATED ORAL
Status: COMPLETED | OUTPATIENT
Start: 2023-09-21 | End: 2023-09-21

## 2023-09-21 RX ORDER — AZACITIDINE 100 MG/1
75 INJECTION, POWDER, LYOPHILIZED, FOR SOLUTION INTRAVENOUS; SUBCUTANEOUS
Status: COMPLETED | OUTPATIENT
Start: 2023-09-21 | End: 2023-09-21

## 2023-09-21 RX ADMIN — ONDANSETRON HYDROCHLORIDE 16 MG: 8 TABLET, FILM COATED ORAL at 01:09

## 2023-09-21 RX ADMIN — AZACITIDINE 140 MG: 100 INJECTION, POWDER, LYOPHILIZED, FOR SOLUTION INTRAVENOUS; SUBCUTANEOUS at 02:09

## 2023-09-21 NOTE — NURSING
Patient tolerated Cycle 4 Day 4 Vidaza injection well, VSS, no complaints at this time. Treatment calendar printed and reviewed. Pt d/c in no acute distress.

## 2023-09-22 ENCOUNTER — INFUSION (OUTPATIENT)
Dept: INFUSION THERAPY | Facility: HOSPITAL | Age: 68
End: 2023-09-22
Attending: INTERNAL MEDICINE
Payer: MEDICARE

## 2023-09-22 VITALS
DIASTOLIC BLOOD PRESSURE: 75 MMHG | HEART RATE: 93 BPM | HEIGHT: 69 IN | OXYGEN SATURATION: 96 % | WEIGHT: 167.13 LBS | RESPIRATION RATE: 17 BRPM | BODY MASS INDEX: 24.75 KG/M2 | TEMPERATURE: 98 F | SYSTOLIC BLOOD PRESSURE: 118 MMHG

## 2023-09-22 DIAGNOSIS — D46.9 MYELODYSPLASTIC SYNDROME: Primary | ICD-10-CM

## 2023-09-22 PROCEDURE — 96401 CHEMO ANTI-NEOPL SQ/IM: CPT

## 2023-09-22 PROCEDURE — 63600175 PHARM REV CODE 636 W HCPCS: Performed by: INTERNAL MEDICINE

## 2023-09-22 PROCEDURE — 25000003 PHARM REV CODE 250: Performed by: INTERNAL MEDICINE

## 2023-09-22 RX ORDER — ONDANSETRON HYDROCHLORIDE 8 MG/1
16 TABLET, FILM COATED ORAL
Status: COMPLETED | OUTPATIENT
Start: 2023-09-22 | End: 2023-09-22

## 2023-09-22 RX ORDER — AZACITIDINE 100 MG/1
75 INJECTION, POWDER, LYOPHILIZED, FOR SOLUTION INTRAVENOUS; SUBCUTANEOUS
Status: COMPLETED | OUTPATIENT
Start: 2023-09-22 | End: 2023-09-22

## 2023-09-22 RX ADMIN — AZACITIDINE 140 MG: 100 INJECTION, POWDER, LYOPHILIZED, FOR SOLUTION INTRAVENOUS; SUBCUTANEOUS at 02:09

## 2023-09-22 RX ADMIN — ONDANSETRON HYDROCHLORIDE 16 MG: 8 TABLET, FILM COATED ORAL at 01:09

## 2023-09-22 NOTE — NURSING
Patient tolerated Cycle 4 Day 5 Vidaza injection well, VSS. No complaints at this time. Treatment calendar printed and reviewed. Pt d/c in no acute distress.

## 2023-09-25 ENCOUNTER — PATIENT MESSAGE (OUTPATIENT)
Dept: HEMATOLOGY/ONCOLOGY | Facility: CLINIC | Age: 68
End: 2023-09-25
Payer: MEDICARE

## 2023-09-25 ENCOUNTER — LAB VISIT (OUTPATIENT)
Dept: LAB | Facility: HOSPITAL | Age: 68
End: 2023-09-25
Payer: MEDICARE

## 2023-09-25 DIAGNOSIS — D64.9 SYMPTOMATIC ANEMIA: ICD-10-CM

## 2023-09-25 DIAGNOSIS — D46.9 MYELODYSPLASTIC SYNDROME: ICD-10-CM

## 2023-09-25 LAB
ABO + RH BLD: NORMAL
ALBUMIN SERPL BCP-MCNC: 4 G/DL (ref 3.5–5.2)
ALP SERPL-CCNC: 70 U/L (ref 55–135)
ALT SERPL W/O P-5'-P-CCNC: 16 U/L (ref 10–44)
ANION GAP SERPL CALC-SCNC: 10 MMOL/L (ref 8–16)
AST SERPL-CCNC: 16 U/L (ref 10–40)
BASOPHILS # BLD AUTO: 0.01 K/UL (ref 0–0.2)
BASOPHILS NFR BLD: 0.4 % (ref 0–1.9)
BILIRUB SERPL-MCNC: 0.4 MG/DL (ref 0.1–1)
BLD GP AB SCN CELLS X3 SERPL QL: NORMAL
BUN SERPL-MCNC: 15 MG/DL (ref 8–23)
CALCIUM SERPL-MCNC: 9.7 MG/DL (ref 8.7–10.5)
CHLORIDE SERPL-SCNC: 103 MMOL/L (ref 95–110)
CO2 SERPL-SCNC: 25 MMOL/L (ref 23–29)
CREAT SERPL-MCNC: 1.2 MG/DL (ref 0.5–1.4)
DIFFERENTIAL METHOD: ABNORMAL
EOSINOPHIL # BLD AUTO: 0 K/UL (ref 0–0.5)
EOSINOPHIL NFR BLD: 1.5 % (ref 0–8)
ERYTHROCYTE [DISTWIDTH] IN BLOOD BY AUTOMATED COUNT: 18.6 % (ref 11.5–14.5)
EST. GFR  (NO RACE VARIABLE): >60 ML/MIN/1.73 M^2
GLUCOSE SERPL-MCNC: 113 MG/DL (ref 70–110)
HCT VFR BLD AUTO: 43.2 % (ref 40–54)
HGB BLD-MCNC: 14.2 G/DL (ref 14–18)
IMM GRANULOCYTES # BLD AUTO: 0 K/UL (ref 0–0.04)
IMM GRANULOCYTES NFR BLD AUTO: 0 % (ref 0–0.5)
LYMPHOCYTES # BLD AUTO: 1.4 K/UL (ref 1–4.8)
LYMPHOCYTES NFR BLD: 50.7 % (ref 18–48)
MAGNESIUM SERPL-MCNC: 2.1 MG/DL (ref 1.6–2.6)
MCH RBC QN AUTO: 33.7 PG (ref 27–31)
MCHC RBC AUTO-ENTMCNC: 32.9 G/DL (ref 32–36)
MCV RBC AUTO: 103 FL (ref 82–98)
MONOCYTES # BLD AUTO: 0.2 K/UL (ref 0.3–1)
MONOCYTES NFR BLD: 6.6 % (ref 4–15)
NEUTROPHILS # BLD AUTO: 1.1 K/UL (ref 1.8–7.7)
NEUTROPHILS NFR BLD: 40.8 % (ref 38–73)
NRBC BLD-RTO: 0 /100 WBC
PHOSPHATE SERPL-MCNC: 3.9 MG/DL (ref 2.7–4.5)
PLATELET # BLD AUTO: 132 K/UL (ref 150–450)
PMV BLD AUTO: 9.9 FL (ref 9.2–12.9)
POTASSIUM SERPL-SCNC: 3.9 MMOL/L (ref 3.5–5.1)
PROT SERPL-MCNC: 7.6 G/DL (ref 6–8.4)
RBC # BLD AUTO: 4.21 M/UL (ref 4.6–6.2)
SODIUM SERPL-SCNC: 138 MMOL/L (ref 136–145)
SPECIMEN OUTDATE: NORMAL
WBC # BLD AUTO: 2.74 K/UL (ref 3.9–12.7)

## 2023-09-25 PROCEDURE — 84100 ASSAY OF PHOSPHORUS: CPT | Performed by: INTERNAL MEDICINE

## 2023-09-25 PROCEDURE — 36415 COLL VENOUS BLD VENIPUNCTURE: CPT | Performed by: INTERNAL MEDICINE

## 2023-09-25 PROCEDURE — 80053 COMPREHEN METABOLIC PANEL: CPT | Performed by: INTERNAL MEDICINE

## 2023-09-25 PROCEDURE — 85025 COMPLETE CBC W/AUTO DIFF WBC: CPT | Performed by: INTERNAL MEDICINE

## 2023-09-25 PROCEDURE — 86850 RBC ANTIBODY SCREEN: CPT | Performed by: INTERNAL MEDICINE

## 2023-09-25 PROCEDURE — 83735 ASSAY OF MAGNESIUM: CPT | Performed by: INTERNAL MEDICINE

## 2023-09-28 ENCOUNTER — LAB VISIT (OUTPATIENT)
Dept: LAB | Facility: HOSPITAL | Age: 68
End: 2023-09-28
Attending: INTERNAL MEDICINE
Payer: MEDICARE

## 2023-09-28 DIAGNOSIS — D64.9 SYMPTOMATIC ANEMIA: ICD-10-CM

## 2023-09-28 DIAGNOSIS — D46.9 MYELODYSPLASTIC SYNDROME: ICD-10-CM

## 2023-09-28 DIAGNOSIS — Z76.82 STEM CELL TRANSPLANT CANDIDATE: Primary | ICD-10-CM

## 2023-09-28 LAB
ALBUMIN SERPL BCP-MCNC: 3.9 G/DL (ref 3.5–5.2)
ALP SERPL-CCNC: 75 U/L (ref 55–135)
ALT SERPL W/O P-5'-P-CCNC: 17 U/L (ref 10–44)
ANION GAP SERPL CALC-SCNC: 13 MMOL/L (ref 8–16)
ANISOCYTOSIS BLD QL SMEAR: ABNORMAL
ANNOTATION COMMENT IMP: NORMAL
AST SERPL-CCNC: 17 U/L (ref 10–40)
BASOPHILS # BLD AUTO: 0.01 K/UL (ref 0–0.2)
BASOPHILS NFR BLD: 0.4 % (ref 0–1.9)
BILIRUB SERPL-MCNC: 0.2 MG/DL (ref 0.1–1)
BUN SERPL-MCNC: 15 MG/DL (ref 8–23)
CALCIUM SERPL-MCNC: 10.1 MG/DL (ref 8.7–10.5)
CHLORIDE SERPL-SCNC: 102 MMOL/L (ref 95–110)
CO2 SERPL-SCNC: 26 MMOL/L (ref 23–29)
COMMENT: NORMAL
CREAT SERPL-MCNC: 1.1 MG/DL (ref 0.5–1.4)
DACRYOCYTES BLD QL SMEAR: ABNORMAL
DIFFERENTIAL METHOD: ABNORMAL
EOSINOPHIL # BLD AUTO: 0 K/UL (ref 0–0.5)
EOSINOPHIL NFR BLD: 0.7 % (ref 0–8)
ERYTHROCYTE [DISTWIDTH] IN BLOOD BY AUTOMATED COUNT: 18.5 % (ref 11.5–14.5)
EST. GFR  (NO RACE VARIABLE): >60 ML/MIN/1.73 M^2
FERRITIN SERPL-MCNC: 731 NG/ML (ref 20–300)
FINAL PATHOLOGIC DIAGNOSIS: NORMAL
GLUCOSE SERPL-MCNC: 102 MG/DL (ref 70–110)
GROSS: NORMAL
HCT VFR BLD AUTO: 41 % (ref 40–54)
HGB BLD-MCNC: 13.5 G/DL (ref 14–18)
IMM GRANULOCYTES # BLD AUTO: 0.01 K/UL (ref 0–0.04)
IMM GRANULOCYTES NFR BLD AUTO: 0.4 % (ref 0–0.5)
IRON SERPL-MCNC: 113 UG/DL (ref 45–160)
LDH SERPL L TO P-CCNC: 154 U/L (ref 110–260)
LYMPHOCYTES # BLD AUTO: 1.6 K/UL (ref 1–4.8)
LYMPHOCYTES NFR BLD: 57.6 % (ref 18–48)
Lab: NORMAL
MAGNESIUM SERPL-MCNC: 2.1 MG/DL (ref 1.6–2.6)
MCH RBC QN AUTO: 33.8 PG (ref 27–31)
MCHC RBC AUTO-ENTMCNC: 32.9 G/DL (ref 32–36)
MCV RBC AUTO: 103 FL (ref 82–98)
MICROSCOPIC EXAM: NORMAL
MONOCYTES # BLD AUTO: 0.3 K/UL (ref 0.3–1)
MONOCYTES NFR BLD: 10.8 % (ref 4–15)
NEUTROPHILS # BLD AUTO: 0.8 K/UL (ref 1.8–7.7)
NEUTROPHILS NFR BLD: 30.1 % (ref 38–73)
NGS CLINCIAL TRIALS: NORMAL
NGS INDICATION OF TEST: NORMAL
NGS INTERPRETATION: NORMAL
NGS ONCOHEME PANEL GENE LIST: NORMAL
NGS PATHOGENIC MUTATIONS DETECTED: NORMAL
NGS REVIEWED BY:: NORMAL
NGS VARIANTS OF UNKNOWN SIGNIFICANCE: NORMAL
NGSHM RESULT, BONE MARROW: NORMAL
NRBC BLD-RTO: 0 /100 WBC
OVALOCYTES BLD QL SMEAR: ABNORMAL
PHOSPHATE SERPL-MCNC: 4.6 MG/DL (ref 2.7–4.5)
PLATELET # BLD AUTO: 105 K/UL (ref 150–450)
PLATELET BLD QL SMEAR: ABNORMAL
PMV BLD AUTO: 9.6 FL (ref 9.2–12.9)
POLYCHROMASIA BLD QL SMEAR: ABNORMAL
POTASSIUM SERPL-SCNC: 4.1 MMOL/L (ref 3.5–5.1)
PROT SERPL-MCNC: 7.6 G/DL (ref 6–8.4)
RBC # BLD AUTO: 4 M/UL (ref 4.6–6.2)
REF LAB TEST METHOD: NORMAL
SATURATED IRON: 37 % (ref 20–50)
SODIUM SERPL-SCNC: 141 MMOL/L (ref 136–145)
SPECIMEN SOURCE: NORMAL
SUPPLEMENTAL DIAGNOSIS: NORMAL
TEST PERFORMANCE INFO SPEC: NORMAL
TOTAL IRON BINDING CAPACITY: 308 UG/DL (ref 250–450)
TRANSFERRIN SERPL-MCNC: 208 MG/DL (ref 200–375)
URATE SERPL-MCNC: 5.3 MG/DL (ref 3.4–7)
WBC # BLD AUTO: 2.78 K/UL (ref 3.9–12.7)

## 2023-09-28 PROCEDURE — 84550 ASSAY OF BLOOD/URIC ACID: CPT | Performed by: INTERNAL MEDICINE

## 2023-09-28 PROCEDURE — 83735 ASSAY OF MAGNESIUM: CPT | Performed by: INTERNAL MEDICINE

## 2023-09-28 PROCEDURE — 83615 LACTATE (LD) (LDH) ENZYME: CPT | Performed by: INTERNAL MEDICINE

## 2023-09-28 PROCEDURE — 85025 COMPLETE CBC W/AUTO DIFF WBC: CPT | Performed by: INTERNAL MEDICINE

## 2023-09-28 PROCEDURE — 36415 COLL VENOUS BLD VENIPUNCTURE: CPT | Performed by: INTERNAL MEDICINE

## 2023-09-28 PROCEDURE — 83540 ASSAY OF IRON: CPT | Performed by: INTERNAL MEDICINE

## 2023-09-28 PROCEDURE — 84466 ASSAY OF TRANSFERRIN: CPT | Performed by: INTERNAL MEDICINE

## 2023-09-28 PROCEDURE — 82728 ASSAY OF FERRITIN: CPT | Performed by: INTERNAL MEDICINE

## 2023-09-28 PROCEDURE — 80053 COMPREHEN METABOLIC PANEL: CPT | Performed by: INTERNAL MEDICINE

## 2023-09-28 PROCEDURE — 84100 ASSAY OF PHOSPHORUS: CPT | Performed by: INTERNAL MEDICINE

## 2023-10-02 ENCOUNTER — LAB VISIT (OUTPATIENT)
Dept: LAB | Facility: HOSPITAL | Age: 68
End: 2023-10-02
Payer: MEDICARE

## 2023-10-02 DIAGNOSIS — D64.9 ANEMIA, UNSPECIFIED TYPE: ICD-10-CM

## 2023-10-02 DIAGNOSIS — Z76.82 STEM CELL TRANSPLANT CANDIDATE: ICD-10-CM

## 2023-10-02 DIAGNOSIS — D46.9 MYELODYSPLASTIC SYNDROME: ICD-10-CM

## 2023-10-02 LAB
ANISOCYTOSIS BLD QL SMEAR: SLIGHT
BASOPHILS # BLD AUTO: 0.02 K/UL (ref 0–0.2)
BASOPHILS NFR BLD: 0.8 % (ref 0–1.9)
DIFFERENTIAL METHOD: ABNORMAL
EOSINOPHIL # BLD AUTO: 0 K/UL (ref 0–0.5)
EOSINOPHIL NFR BLD: 1.2 % (ref 0–8)
ERYTHROCYTE [DISTWIDTH] IN BLOOD BY AUTOMATED COUNT: 18.6 % (ref 11.5–14.5)
HCT VFR BLD AUTO: 41 % (ref 40–54)
HGB BLD-MCNC: 13.8 G/DL (ref 14–18)
IMM GRANULOCYTES # BLD AUTO: 0 K/UL (ref 0–0.04)
IMM GRANULOCYTES NFR BLD AUTO: 0 % (ref 0–0.5)
LYMPHOCYTES # BLD AUTO: 1.6 K/UL (ref 1–4.8)
LYMPHOCYTES NFR BLD: 60.1 % (ref 18–48)
MCH RBC QN AUTO: 34.3 PG (ref 27–31)
MCHC RBC AUTO-ENTMCNC: 33.7 G/DL (ref 32–36)
MCV RBC AUTO: 102 FL (ref 82–98)
MONOCYTES # BLD AUTO: 0.3 K/UL (ref 0.3–1)
MONOCYTES NFR BLD: 12 % (ref 4–15)
NEUTROPHILS # BLD AUTO: 0.7 K/UL (ref 1.8–7.7)
NEUTROPHILS NFR BLD: 25.9 % (ref 38–73)
NRBC BLD-RTO: 0 /100 WBC
PLATELET # BLD AUTO: 94 K/UL (ref 150–450)
PLATELET BLD QL SMEAR: ABNORMAL
PMV BLD AUTO: 12.1 FL (ref 9.2–12.9)
RBC # BLD AUTO: 4.02 M/UL (ref 4.6–6.2)
WBC # BLD AUTO: 2.58 K/UL (ref 3.9–12.7)

## 2023-10-02 PROCEDURE — 86977 RBC SERUM PRETX INCUBJ/INHIB: CPT | Mod: 59 | Performed by: INTERNAL MEDICINE

## 2023-10-02 PROCEDURE — 86833 HLA CLASS II HIGH DEFIN QUAL: CPT | Performed by: INTERNAL MEDICINE

## 2023-10-02 PROCEDURE — 81373 HLA I TYPING 1 LOCUS LR: CPT | Performed by: INTERNAL MEDICINE

## 2023-10-02 PROCEDURE — 81376 HLA II TYPING 1 LOCUS LR: CPT | Mod: 59 | Performed by: INTERNAL MEDICINE

## 2023-10-02 PROCEDURE — 81376 HLA II TYPING 1 LOCUS LR: CPT | Performed by: INTERNAL MEDICINE

## 2023-10-02 PROCEDURE — 81373 HLA I TYPING 1 LOCUS LR: CPT | Mod: 59 | Performed by: INTERNAL MEDICINE

## 2023-10-02 PROCEDURE — 85025 COMPLETE CBC W/AUTO DIFF WBC: CPT | Performed by: INTERNAL MEDICINE

## 2023-10-02 PROCEDURE — 86832 HLA CLASS I HIGH DEFIN QUAL: CPT | Performed by: INTERNAL MEDICINE

## 2023-10-02 PROCEDURE — 36415 COLL VENOUS BLD VENIPUNCTURE: CPT | Performed by: INTERNAL MEDICINE

## 2023-10-03 LAB — HPRA INTERPRETATION: NORMAL

## 2023-10-05 ENCOUNTER — LAB VISIT (OUTPATIENT)
Dept: LAB | Facility: HOSPITAL | Age: 68
End: 2023-10-05
Payer: MEDICARE

## 2023-10-05 DIAGNOSIS — D64.9 SYMPTOMATIC ANEMIA: ICD-10-CM

## 2023-10-05 DIAGNOSIS — D64.9 ANEMIA, UNSPECIFIED TYPE: ICD-10-CM

## 2023-10-05 LAB
ABO + RH BLD: NORMAL
ALBUMIN SERPL BCP-MCNC: 3.9 G/DL (ref 3.5–5.2)
ALP SERPL-CCNC: 66 U/L (ref 55–135)
ALT SERPL W/O P-5'-P-CCNC: 31 U/L (ref 10–44)
ANION GAP SERPL CALC-SCNC: 12 MMOL/L (ref 8–16)
AST SERPL-CCNC: 24 U/L (ref 10–40)
BASOPHILS # BLD AUTO: 0.02 K/UL (ref 0–0.2)
BASOPHILS NFR BLD: 0.8 % (ref 0–1.9)
BILIRUB SERPL-MCNC: 0.1 MG/DL (ref 0.1–1)
BLD GP AB SCN CELLS X3 SERPL QL: NORMAL
BUN SERPL-MCNC: 20 MG/DL (ref 8–23)
CALCIUM SERPL-MCNC: 9.5 MG/DL (ref 8.7–10.5)
CHLORIDE SERPL-SCNC: 105 MMOL/L (ref 95–110)
CO2 SERPL-SCNC: 23 MMOL/L (ref 23–29)
CREAT SERPL-MCNC: 1.2 MG/DL (ref 0.5–1.4)
DIFFERENTIAL METHOD: ABNORMAL
EOSINOPHIL # BLD AUTO: 0 K/UL (ref 0–0.5)
EOSINOPHIL NFR BLD: 0.8 % (ref 0–8)
ERYTHROCYTE [DISTWIDTH] IN BLOOD BY AUTOMATED COUNT: 18.7 % (ref 11.5–14.5)
EST. GFR  (NO RACE VARIABLE): >60 ML/MIN/1.73 M^2
FERRITIN SERPL-MCNC: 1099 NG/ML (ref 20–300)
GLUCOSE SERPL-MCNC: 99 MG/DL (ref 70–110)
HCT VFR BLD AUTO: 40.9 % (ref 40–54)
HGB BLD-MCNC: 13.6 G/DL (ref 14–18)
IGA SERPL-MCNC: 331 MG/DL (ref 40–350)
IGG SERPL-MCNC: 1282 MG/DL (ref 650–1600)
IGM SERPL-MCNC: 34 MG/DL (ref 50–300)
IMM GRANULOCYTES # BLD AUTO: 0 K/UL (ref 0–0.04)
IMM GRANULOCYTES NFR BLD AUTO: 0 % (ref 0–0.5)
IRON SERPL-MCNC: 125 UG/DL (ref 45–160)
LDH SERPL L TO P-CCNC: 118 U/L (ref 110–260)
LYMPHOCYTES # BLD AUTO: 1.6 K/UL (ref 1–4.8)
LYMPHOCYTES NFR BLD: 65.1 % (ref 18–48)
MCH RBC QN AUTO: 33.8 PG (ref 27–31)
MCHC RBC AUTO-ENTMCNC: 33.3 G/DL (ref 32–36)
MCV RBC AUTO: 102 FL (ref 82–98)
MONOCYTES # BLD AUTO: 0.2 K/UL (ref 0.3–1)
MONOCYTES NFR BLD: 8.8 % (ref 4–15)
NEUTROPHILS # BLD AUTO: 0.6 K/UL (ref 1.8–7.7)
NEUTROPHILS NFR BLD: 24.5 % (ref 38–73)
NRBC BLD-RTO: 0 /100 WBC
PLATELET # BLD AUTO: 76 K/UL (ref 150–450)
PMV BLD AUTO: 8.8 FL (ref 9.2–12.9)
POTASSIUM SERPL-SCNC: 4.1 MMOL/L (ref 3.5–5.1)
PROT SERPL-MCNC: 7.4 G/DL (ref 6–8.4)
RBC # BLD AUTO: 4.02 M/UL (ref 4.6–6.2)
SATURATED IRON: 40 % (ref 20–50)
SODIUM SERPL-SCNC: 140 MMOL/L (ref 136–145)
SPECIMEN OUTDATE: NORMAL
TOTAL IRON BINDING CAPACITY: 309 UG/DL (ref 250–450)
TRANSFERRIN SERPL-MCNC: 209 MG/DL (ref 200–375)
URATE SERPL-MCNC: 6.2 MG/DL (ref 3.4–7)
WBC # BLD AUTO: 2.49 K/UL (ref 3.9–12.7)

## 2023-10-05 PROCEDURE — 80053 COMPREHEN METABOLIC PANEL: CPT | Performed by: INTERNAL MEDICINE

## 2023-10-05 PROCEDURE — 83521 IG LIGHT CHAINS FREE EACH: CPT | Performed by: INTERNAL MEDICINE

## 2023-10-05 PROCEDURE — 86901 BLOOD TYPING SEROLOGIC RH(D): CPT | Performed by: INTERNAL MEDICINE

## 2023-10-05 PROCEDURE — 85025 COMPLETE CBC W/AUTO DIFF WBC: CPT | Performed by: INTERNAL MEDICINE

## 2023-10-05 PROCEDURE — 86334 IMMUNOFIX E-PHORESIS SERUM: CPT | Mod: 26,,, | Performed by: PATHOLOGY

## 2023-10-05 PROCEDURE — 86334 IMMUNOFIX E-PHORESIS SERUM: CPT | Performed by: INTERNAL MEDICINE

## 2023-10-05 PROCEDURE — 86334 PATHOLOGIST INTERPRETATION IFE: ICD-10-PCS | Mod: 26,,, | Performed by: PATHOLOGY

## 2023-10-05 PROCEDURE — 82728 ASSAY OF FERRITIN: CPT | Performed by: INTERNAL MEDICINE

## 2023-10-05 PROCEDURE — 83615 LACTATE (LD) (LDH) ENZYME: CPT | Performed by: INTERNAL MEDICINE

## 2023-10-05 PROCEDURE — 82784 ASSAY IGA/IGD/IGG/IGM EACH: CPT | Mod: 59 | Performed by: INTERNAL MEDICINE

## 2023-10-05 PROCEDURE — 84466 ASSAY OF TRANSFERRIN: CPT | Performed by: INTERNAL MEDICINE

## 2023-10-05 PROCEDURE — 84550 ASSAY OF BLOOD/URIC ACID: CPT | Performed by: INTERNAL MEDICINE

## 2023-10-06 LAB
INTERPRETATION SERPL IFE-IMP: NORMAL
KAPPA LC SER QL IA: 3.24 MG/DL (ref 0.33–1.94)
KAPPA LC/LAMBDA SER IA: 1.74 (ref 0.26–1.65)
LAMBDA LC SER QL IA: 1.86 MG/DL (ref 0.57–2.63)

## 2023-10-09 ENCOUNTER — LAB VISIT (OUTPATIENT)
Dept: LAB | Facility: HOSPITAL | Age: 68
End: 2023-10-09
Payer: MEDICARE

## 2023-10-09 DIAGNOSIS — D46.9 MYELODYSPLASTIC SYNDROME: ICD-10-CM

## 2023-10-09 LAB
ALBUMIN SERPL BCP-MCNC: 3.8 G/DL (ref 3.5–5.2)
ALP SERPL-CCNC: 62 U/L (ref 55–135)
ALT SERPL W/O P-5'-P-CCNC: 26 U/L (ref 10–44)
ANION GAP SERPL CALC-SCNC: 9 MMOL/L (ref 8–16)
ANISOCYTOSIS BLD QL SMEAR: SLIGHT
AST SERPL-CCNC: 21 U/L (ref 10–40)
BASOPHILS # BLD AUTO: 0.05 K/UL (ref 0–0.2)
BASOPHILS NFR BLD: 2.4 % (ref 0–1.9)
BILIRUB SERPL-MCNC: 0.3 MG/DL (ref 0.1–1)
BUN SERPL-MCNC: 16 MG/DL (ref 8–23)
CALCIUM SERPL-MCNC: 9.2 MG/DL (ref 8.7–10.5)
CHLORIDE SERPL-SCNC: 106 MMOL/L (ref 95–110)
CO2 SERPL-SCNC: 26 MMOL/L (ref 23–29)
CREAT SERPL-MCNC: 1 MG/DL (ref 0.5–1.4)
DIFFERENTIAL METHOD: ABNORMAL
EOSINOPHIL # BLD AUTO: 0 K/UL (ref 0–0.5)
EOSINOPHIL NFR BLD: 1 % (ref 0–8)
ERYTHROCYTE [DISTWIDTH] IN BLOOD BY AUTOMATED COUNT: 19 % (ref 11.5–14.5)
EST. GFR  (NO RACE VARIABLE): >60 ML/MIN/1.73 M^2
GLUCOSE SERPL-MCNC: 132 MG/DL (ref 70–110)
HCT VFR BLD AUTO: 41.6 % (ref 40–54)
HGB BLD-MCNC: 13.6 G/DL (ref 14–18)
HYPOCHROMIA BLD QL SMEAR: ABNORMAL
IMM GRANULOCYTES # BLD AUTO: 0 K/UL (ref 0–0.04)
IMM GRANULOCYTES NFR BLD AUTO: 0 % (ref 0–0.5)
LDH SERPL L TO P-CCNC: 140 U/L (ref 110–260)
LYMPHOCYTES # BLD AUTO: 1.3 K/UL (ref 1–4.8)
LYMPHOCYTES NFR BLD: 64.4 % (ref 18–48)
MCH RBC QN AUTO: 33.7 PG (ref 27–31)
MCHC RBC AUTO-ENTMCNC: 32.7 G/DL (ref 32–36)
MCV RBC AUTO: 103 FL (ref 82–98)
MONOCYTES # BLD AUTO: 0.1 K/UL (ref 0.3–1)
MONOCYTES NFR BLD: 6.3 % (ref 4–15)
NEUTROPHILS # BLD AUTO: 0.5 K/UL (ref 1.8–7.7)
NEUTROPHILS NFR BLD: 25.9 % (ref 38–73)
NRBC BLD-RTO: 0 /100 WBC
PATHOLOGIST INTERPRETATION IFE: NORMAL
PLATELET # BLD AUTO: 114 K/UL (ref 150–450)
PLATELET BLD QL SMEAR: ABNORMAL
PMV BLD AUTO: 11.6 FL (ref 9.2–12.9)
POIKILOCYTOSIS BLD QL SMEAR: SLIGHT
POLYCHROMASIA BLD QL SMEAR: ABNORMAL
POTASSIUM SERPL-SCNC: 3.9 MMOL/L (ref 3.5–5.1)
PROT SERPL-MCNC: 7.1 G/DL (ref 6–8.4)
RBC # BLD AUTO: 4.04 M/UL (ref 4.6–6.2)
SODIUM SERPL-SCNC: 141 MMOL/L (ref 136–145)
URATE SERPL-MCNC: 6.2 MG/DL (ref 3.4–7)
WBC # BLD AUTO: 2.05 K/UL (ref 3.9–12.7)

## 2023-10-09 PROCEDURE — 83615 LACTATE (LD) (LDH) ENZYME: CPT | Performed by: INTERNAL MEDICINE

## 2023-10-09 PROCEDURE — 80053 COMPREHEN METABOLIC PANEL: CPT | Performed by: INTERNAL MEDICINE

## 2023-10-09 PROCEDURE — 85025 COMPLETE CBC W/AUTO DIFF WBC: CPT | Performed by: INTERNAL MEDICINE

## 2023-10-09 PROCEDURE — 36415 COLL VENOUS BLD VENIPUNCTURE: CPT | Performed by: INTERNAL MEDICINE

## 2023-10-09 PROCEDURE — 84550 ASSAY OF BLOOD/URIC ACID: CPT | Performed by: INTERNAL MEDICINE

## 2023-10-12 ENCOUNTER — LAB VISIT (OUTPATIENT)
Dept: LAB | Facility: HOSPITAL | Age: 68
End: 2023-10-12
Attending: INTERNAL MEDICINE
Payer: MEDICARE

## 2023-10-12 DIAGNOSIS — D64.9 SYMPTOMATIC ANEMIA: ICD-10-CM

## 2023-10-12 LAB
ABO + RH BLD: NORMAL
ALBUMIN SERPL BCP-MCNC: 4 G/DL (ref 3.5–5.2)
ALP SERPL-CCNC: 62 U/L (ref 55–135)
ALT SERPL W/O P-5'-P-CCNC: 25 U/L (ref 10–44)
ANION GAP SERPL CALC-SCNC: 9 MMOL/L (ref 8–16)
ANISOCYTOSIS BLD QL SMEAR: SLIGHT
AST SERPL-CCNC: 20 U/L (ref 10–40)
BASOPHILS # BLD AUTO: 0.08 K/UL (ref 0–0.2)
BASOPHILS NFR BLD: 3.7 % (ref 0–1.9)
BILIRUB SERPL-MCNC: 0.3 MG/DL (ref 0.1–1)
BLD GP AB SCN CELLS X3 SERPL QL: NORMAL
BUN SERPL-MCNC: 14 MG/DL (ref 8–23)
CALCIUM SERPL-MCNC: 9.7 MG/DL (ref 8.7–10.5)
CHLORIDE SERPL-SCNC: 104 MMOL/L (ref 95–110)
CO2 SERPL-SCNC: 27 MMOL/L (ref 23–29)
CREAT SERPL-MCNC: 1 MG/DL (ref 0.5–1.4)
DIFFERENTIAL METHOD: ABNORMAL
EOSINOPHIL # BLD AUTO: 0 K/UL (ref 0–0.5)
EOSINOPHIL NFR BLD: 1.8 % (ref 0–8)
ERYTHROCYTE [DISTWIDTH] IN BLOOD BY AUTOMATED COUNT: 18.9 % (ref 11.5–14.5)
EST. GFR  (NO RACE VARIABLE): >60 ML/MIN/1.73 M^2
FERRITIN SERPL-MCNC: 899 NG/ML (ref 20–300)
GLUCOSE SERPL-MCNC: 106 MG/DL (ref 70–110)
HCT VFR BLD AUTO: 44.5 % (ref 40–54)
HGB BLD-MCNC: 14.6 G/DL (ref 14–18)
HYPOCHROMIA BLD QL SMEAR: ABNORMAL
IMM GRANULOCYTES # BLD AUTO: 0 K/UL (ref 0–0.04)
IMM GRANULOCYTES NFR BLD AUTO: 0 % (ref 0–0.5)
IRON SERPL-MCNC: 131 UG/DL (ref 45–160)
LDH SERPL L TO P-CCNC: 131 U/L (ref 110–260)
LYMPHOCYTES # BLD AUTO: 1.5 K/UL (ref 1–4.8)
LYMPHOCYTES NFR BLD: 68 % (ref 18–48)
MCH RBC QN AUTO: 33.8 PG (ref 27–31)
MCHC RBC AUTO-ENTMCNC: 32.8 G/DL (ref 32–36)
MCV RBC AUTO: 103 FL (ref 82–98)
MONOCYTES # BLD AUTO: 0.1 K/UL (ref 0.3–1)
MONOCYTES NFR BLD: 4.6 % (ref 4–15)
NEUTROPHILS # BLD AUTO: 0.5 K/UL (ref 1.8–7.7)
NEUTROPHILS NFR BLD: 21.9 % (ref 38–73)
NRBC BLD-RTO: 0 /100 WBC
OVALOCYTES BLD QL SMEAR: ABNORMAL
PLATELET # BLD AUTO: 150 K/UL (ref 150–450)
PLATELET BLD QL SMEAR: ABNORMAL
PMV BLD AUTO: 10 FL (ref 9.2–12.9)
POIKILOCYTOSIS BLD QL SMEAR: SLIGHT
POLYCHROMASIA BLD QL SMEAR: ABNORMAL
POTASSIUM SERPL-SCNC: 4.1 MMOL/L (ref 3.5–5.1)
PROT SERPL-MCNC: 7.5 G/DL (ref 6–8.4)
RBC # BLD AUTO: 4.32 M/UL (ref 4.6–6.2)
SATURATED IRON: 41 % (ref 20–50)
SODIUM SERPL-SCNC: 140 MMOL/L (ref 136–145)
SPECIMEN OUTDATE: NORMAL
TOTAL IRON BINDING CAPACITY: 318 UG/DL (ref 250–450)
TRANSFERRIN SERPL-MCNC: 215 MG/DL (ref 200–375)
URATE SERPL-MCNC: 5.5 MG/DL (ref 3.4–7)
WBC # BLD AUTO: 2.19 K/UL (ref 3.9–12.7)

## 2023-10-12 PROCEDURE — 80053 COMPREHEN METABOLIC PANEL: CPT | Performed by: INTERNAL MEDICINE

## 2023-10-12 PROCEDURE — 83615 LACTATE (LD) (LDH) ENZYME: CPT | Performed by: INTERNAL MEDICINE

## 2023-10-12 PROCEDURE — 83540 ASSAY OF IRON: CPT | Performed by: INTERNAL MEDICINE

## 2023-10-12 PROCEDURE — 85025 COMPLETE CBC W/AUTO DIFF WBC: CPT | Performed by: INTERNAL MEDICINE

## 2023-10-12 PROCEDURE — 84466 ASSAY OF TRANSFERRIN: CPT | Performed by: INTERNAL MEDICINE

## 2023-10-12 PROCEDURE — 84550 ASSAY OF BLOOD/URIC ACID: CPT | Performed by: INTERNAL MEDICINE

## 2023-10-12 PROCEDURE — 86901 BLOOD TYPING SEROLOGIC RH(D): CPT | Performed by: INTERNAL MEDICINE

## 2023-10-12 PROCEDURE — 82728 ASSAY OF FERRITIN: CPT | Performed by: INTERNAL MEDICINE

## 2023-10-13 ENCOUNTER — OFFICE VISIT (OUTPATIENT)
Dept: HEMATOLOGY/ONCOLOGY | Facility: CLINIC | Age: 68
End: 2023-10-13
Payer: MEDICARE

## 2023-10-13 VITALS
WEIGHT: 170 LBS | DIASTOLIC BLOOD PRESSURE: 66 MMHG | BODY MASS INDEX: 25.1 KG/M2 | SYSTOLIC BLOOD PRESSURE: 117 MMHG | RESPIRATION RATE: 16 BRPM | OXYGEN SATURATION: 97 % | HEART RATE: 86 BPM

## 2023-10-13 DIAGNOSIS — T45.1X5A CHEMOTHERAPY-INDUCED NAUSEA AND VOMITING: ICD-10-CM

## 2023-10-13 DIAGNOSIS — D84.821 IMMUNODEFICIENCY DUE TO DRUG THERAPY: ICD-10-CM

## 2023-10-13 DIAGNOSIS — D49.9 IMMUNODEFICIENCY SECONDARY TO NEOPLASM: ICD-10-CM

## 2023-10-13 DIAGNOSIS — T45.1X5A CHEMOTHERAPY-INDUCED NEUTROPENIA: ICD-10-CM

## 2023-10-13 DIAGNOSIS — Z79.899 IMMUNODEFICIENCY DUE TO DRUG THERAPY: ICD-10-CM

## 2023-10-13 DIAGNOSIS — R11.2 CHEMOTHERAPY-INDUCED NAUSEA AND VOMITING: ICD-10-CM

## 2023-10-13 DIAGNOSIS — D46.9 MYELODYSPLASTIC SYNDROME: Primary | ICD-10-CM

## 2023-10-13 DIAGNOSIS — D70.1 CHEMOTHERAPY-INDUCED NEUTROPENIA: ICD-10-CM

## 2023-10-13 DIAGNOSIS — D84.81 IMMUNODEFICIENCY SECONDARY TO NEOPLASM: ICD-10-CM

## 2023-10-13 PROCEDURE — 3074F PR MOST RECENT SYSTOLIC BLOOD PRESSURE < 130 MM HG: ICD-10-PCS | Mod: CPTII,S$GLB,, | Performed by: INTERNAL MEDICINE

## 2023-10-13 PROCEDURE — 99215 OFFICE O/P EST HI 40 MIN: CPT | Mod: S$GLB,,, | Performed by: INTERNAL MEDICINE

## 2023-10-13 PROCEDURE — 3288F FALL RISK ASSESSMENT DOCD: CPT | Mod: CPTII,S$GLB,, | Performed by: INTERNAL MEDICINE

## 2023-10-13 PROCEDURE — 1159F PR MEDICATION LIST DOCUMENTED IN MEDICAL RECORD: ICD-10-PCS | Mod: CPTII,S$GLB,, | Performed by: INTERNAL MEDICINE

## 2023-10-13 PROCEDURE — 1126F PR PAIN SEVERITY QUANTIFIED, NO PAIN PRESENT: ICD-10-PCS | Mod: CPTII,S$GLB,, | Performed by: INTERNAL MEDICINE

## 2023-10-13 PROCEDURE — 1160F RVW MEDS BY RX/DR IN RCRD: CPT | Mod: CPTII,S$GLB,, | Performed by: INTERNAL MEDICINE

## 2023-10-13 PROCEDURE — 1126F AMNT PAIN NOTED NONE PRSNT: CPT | Mod: CPTII,S$GLB,, | Performed by: INTERNAL MEDICINE

## 2023-10-13 PROCEDURE — 3288F PR FALLS RISK ASSESSMENT DOCUMENTED: ICD-10-PCS | Mod: CPTII,S$GLB,, | Performed by: INTERNAL MEDICINE

## 2023-10-13 PROCEDURE — 99999 PR PBB SHADOW E&M-EST. PATIENT-LVL III: ICD-10-PCS | Mod: PBBFAC,,, | Performed by: INTERNAL MEDICINE

## 2023-10-13 PROCEDURE — 1160F PR REVIEW ALL MEDS BY PRESCRIBER/CLIN PHARMACIST DOCUMENTED: ICD-10-PCS | Mod: CPTII,S$GLB,, | Performed by: INTERNAL MEDICINE

## 2023-10-13 PROCEDURE — 1101F PT FALLS ASSESS-DOCD LE1/YR: CPT | Mod: CPTII,S$GLB,, | Performed by: INTERNAL MEDICINE

## 2023-10-13 PROCEDURE — 3008F BODY MASS INDEX DOCD: CPT | Mod: CPTII,S$GLB,, | Performed by: INTERNAL MEDICINE

## 2023-10-13 PROCEDURE — 3074F SYST BP LT 130 MM HG: CPT | Mod: CPTII,S$GLB,, | Performed by: INTERNAL MEDICINE

## 2023-10-13 PROCEDURE — 3078F DIAST BP <80 MM HG: CPT | Mod: CPTII,S$GLB,, | Performed by: INTERNAL MEDICINE

## 2023-10-13 PROCEDURE — 99215 PR OFFICE/OUTPT VISIT, EST, LEVL V, 40-54 MIN: ICD-10-PCS | Mod: S$GLB,,, | Performed by: INTERNAL MEDICINE

## 2023-10-13 PROCEDURE — 3008F PR BODY MASS INDEX (BMI) DOCUMENTED: ICD-10-PCS | Mod: CPTII,S$GLB,, | Performed by: INTERNAL MEDICINE

## 2023-10-13 PROCEDURE — 1159F MED LIST DOCD IN RCRD: CPT | Mod: CPTII,S$GLB,, | Performed by: INTERNAL MEDICINE

## 2023-10-13 PROCEDURE — 3078F PR MOST RECENT DIASTOLIC BLOOD PRESSURE < 80 MM HG: ICD-10-PCS | Mod: CPTII,S$GLB,, | Performed by: INTERNAL MEDICINE

## 2023-10-13 PROCEDURE — 99999 PR PBB SHADOW E&M-EST. PATIENT-LVL III: CPT | Mod: PBBFAC,,, | Performed by: INTERNAL MEDICINE

## 2023-10-13 PROCEDURE — 1101F PR PT FALLS ASSESS DOC 0-1 FALLS W/OUT INJ PAST YR: ICD-10-PCS | Mod: CPTII,S$GLB,, | Performed by: INTERNAL MEDICINE

## 2023-10-13 RX ORDER — ONDANSETRON HYDROCHLORIDE 8 MG/1
16 TABLET, FILM COATED ORAL
Status: CANCELLED | OUTPATIENT
Start: 2023-10-26

## 2023-10-13 RX ORDER — AZACITIDINE 100 MG/1
75 INJECTION, POWDER, LYOPHILIZED, FOR SOLUTION INTRAVENOUS; SUBCUTANEOUS
Status: CANCELLED | OUTPATIENT
Start: 2023-10-27

## 2023-10-13 RX ORDER — AZACITIDINE 100 MG/1
75 INJECTION, POWDER, LYOPHILIZED, FOR SOLUTION INTRAVENOUS; SUBCUTANEOUS
Status: CANCELLED | OUTPATIENT
Start: 2023-10-23

## 2023-10-13 RX ORDER — AZACITIDINE 100 MG/1
75 INJECTION, POWDER, LYOPHILIZED, FOR SOLUTION INTRAVENOUS; SUBCUTANEOUS
Status: CANCELLED | OUTPATIENT
Start: 2023-10-25

## 2023-10-13 RX ORDER — ONDANSETRON HYDROCHLORIDE 8 MG/1
16 TABLET, FILM COATED ORAL
Status: CANCELLED | OUTPATIENT
Start: 2023-10-23

## 2023-10-13 RX ORDER — ONDANSETRON HYDROCHLORIDE 8 MG/1
16 TABLET, FILM COATED ORAL
Status: CANCELLED | OUTPATIENT
Start: 2023-10-25

## 2023-10-13 RX ORDER — ONDANSETRON HYDROCHLORIDE 8 MG/1
16 TABLET, FILM COATED ORAL
Status: CANCELLED | OUTPATIENT
Start: 2023-10-24

## 2023-10-13 RX ORDER — AZACITIDINE 100 MG/1
75 INJECTION, POWDER, LYOPHILIZED, FOR SOLUTION INTRAVENOUS; SUBCUTANEOUS
Status: CANCELLED | OUTPATIENT
Start: 2023-10-24

## 2023-10-13 RX ORDER — ONDANSETRON HYDROCHLORIDE 8 MG/1
16 TABLET, FILM COATED ORAL
Status: CANCELLED | OUTPATIENT
Start: 2023-10-27

## 2023-10-13 RX ORDER — AZACITIDINE 100 MG/1
75 INJECTION, POWDER, LYOPHILIZED, FOR SOLUTION INTRAVENOUS; SUBCUTANEOUS
Status: CANCELLED | OUTPATIENT
Start: 2023-10-26

## 2023-10-13 NOTE — Clinical Note
Good morning,  His ANC is low, so can we delay his vidaza from week of 10/16 to week of 10/23?  Thanks! venecia

## 2023-10-13 NOTE — PROGRESS NOTES
PATIENT: Guillaume Salinas  MRN: [id number]  DATE: 10/13/2023    Diagnosis:   1. 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 Immunodeficiency secondary to neoplasm    4. Anemia due to antineoplastic chemotherapy    5. Chemotherapy-induced neutropenia    6. Chemotherapy-induced nausea and vomiting      Chief Complaint: myelodysplastic syndrome    Oncologic History:      Oncologic History Myelodysplastic syndrome      Oncologic Treatment Azacitidine/venetoclax (start date 6/12/23).      Pathology 7/3/23:  BONE MARROW ASPIRATE, TOUCH PREP, CLOT, AND DECALCIFIED NEEDLE CORE BIOPSY:   LEFT POSTEROSUPERIOR ILIAC CREST   - MORPHOLOGIC AND IMMUNOPHENOTYPIC FEATURES OF PERSISTENT MDS   - LIMITED, SUBOPTIMAL SPECIMEN: Findings may not be entirely representative   - Hypocellular marrow (20% total cellularity) with no increased CD34+ blasts, chemotherapeutic changes, and scant residual trilineage hypoplasia and dyspoiesis with increased numbers of micromegakaryocytes   - Relative lymphocytosis including small, well-defined lymphoid aggregates (favor benign/reactive)   - Relative polytypic plasmacytosis (5-10%) with morphologically unremarkable plasma cells   - Mildly increased stainable histiocytic iron stores (4+ out of 6+)     5/23/23:  LEFT ILIAC CREST BONE MARROW ASPIRATE, BONE MARROW CLOT, AND BONE MARROW CORE BIOPSY WITH:     CELLULARITY= 90-95%, MYELOID PREDOMINANCE (M/E= 7:1).   CONSISTENT WITH MYELODYSPLASTIC SYNDROME WITH EXCESS BLASTS-2 (16-17%).  SEE COMMENT.   ADEQUATE STORAGE IRON.   INCREASED NUMBER OF MEGAKARYOCYTES WITH DYSPLASTIC MORPHOLOGY.       4/26/23:  Bone marrow, left iliac crest, aspirate, clot, and core biopsy:   --Hypercellular marrow for age, 80-90% with trilineage maturation showing increased blasts and small megakaryocytic forms, most compatible with a primary marrow neoplasm, favor myelodysplasia with excess blasts (MDS-EB-2) with impending evolution, final    classification pending correlation with cytogenetic and molecular studies, see comment   --Stainable iron is not increased   --Mild reticulin fibrosis          Bone marrow, left iliac crest, aspirate, clot, and core biopsy:   --Hypercellular marrow for age, 80-90% with trilineage maturation showing increased blasts and small megakaryocytic forms, most compatible with a primary marrow neoplasm, favor myelodysplasia with excess blasts (MDS-EB-2) with impending evolution, final   classification pending correlation with cytogenetic and molecular studies, see comment   --Stainable iron is not increased   --Mild reticulin fibrosis     Comment:  Concomitantly submitted flow cytometric analysis detects a mildly increased myeloblast population, concerning for a primary marrow process.  The blast gate is increased with approximately 8% CD38/CD34 positive blasts identified.  Populations of    B lymphocytes are polyclonal and T lymphocytes are immunophenotypically unremarkable.     This study is somewhat limited by lack of cellularity on aspirate smears with mild reticulin fibrosis noted.  However, there are increased CD34 positive blasts, counted at 12% on the immunohistochemical stain with increased megakaryocytes showing many micromegakaryocytic forms.  The morphology in conjunction with peripheral cytopenias is compatible with a primary marrow neoplasm, highly suggestive of myelodysplasia with excess blasts (MDS-EB-2) although an evolving acute leukemia is of concern.     Correlation with clinical findings and any available cytogenetic and molecular studies is required for further characterization.              Subjective:    History of Present Illness: Mr. Betsy Salinas is a 68 y.o. male who presented in April 2023 for evaluation and management of anemia.    [I do not see evidence of anemia in his labs, but he was referred for anemia workup.]    - he went to [place] for a dental procedure. He had several teeth  "removed ("an intense procedure per his wife"). He had taken antibiotics/amoxicillin and ibuprofen. He had the second part of procedure about a week later. He noted severe fatigue, weakness.  - he underwent bone marrow biopsy on 4/26/23.  - he met with Dr. Hickey on 5/8/23. He recommended repeat bone marrow biopsy.  - he underwent bone marrow biopsy on 5/23/23.  - he met with Dr. Hickey on 5/30/23. 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 CG/FISH/NGS pending. I reviewed with him and his family the aggressive nature of this disease, including natural history, prognosis, and treatment options. I recommended treatment with azacitidine 75 mg/m2 daily x7 days plus venetoclax 100mg (posa) daily x21 of 28 days. He was in agreement. Consent signed today.   Azacitidine plus venetoclax x21 of 28 days as above. Taz ramp up ordered.  Repeat bone marrow biopsy at the end of cycle 1. Orders placed.     Stem cell transplant candidate: We briefly discussed allogeneic stem cell transplantation in MDS. He will need transplant in CR1. Will plan for further discussion of stem cell transplant and meeting with transplant coordinators at next follow up in 2-3 weeks.   Will send HLA typing with next labs."    - he underwent bone marrow biopsy on 7/3/23    - he met with Dr. Hickey on 6/20/23. Information per note:  "HCT-CI of 1 (intermediate risk). Will plan to proceed with transplant in the next 2-3 months (as soon as a donor is identified). HLA typing sent."    - he saw Dr. Hickey on 7/11/23. Per his note: "will plan to proceed with transplant in the next 2-3 months (as soon as a donor is identified)."    - he underwent bone marrow biopsy on 9/6/23.    Interval history:  - he presents for a follow-up appointment for his myelodysplastic syndrome.  - plan is still for transplant once a donor is found.  - he is scheduled to start cycle #5 of azacitidine/venetoclax on 10/16/23  - today, he " is doing okay. He endorses severe fatigue, nausea. He denies shortness of breath, chest pain, diarrhea, constipation.        Past medical, surgical, family, and social histories have been reviewed and updated below.    Past Medical History:   Past Medical History:   Diagnosis Date    Anticoagulant long-term use     Coronary artery disease     Hypertension     Peripheral vascular disease, unspecified        Past Surgical History:   Past Surgical History:   Procedure Laterality Date    BONE MARROW BIOPSY Left 4/26/2023    Procedure: Biopsy-bone marrow;  Surgeon: Harry Diamond MD;  Location: [facility] OR;  Service: Oncology;  Laterality: Left;    COLONOSCOPY N/A 01/05/2022    Procedure: COLONOSCOPY Golytely Vaccinated will bring cards;  Surgeon: Dereje Simon MD;  Location: [facility] ENDO;  Service: Endoscopy;  Laterality: N/A;  Do not cancel this order       Family History:   Family History   Problem Relation Age of Onset    Hypertension Mother     Cancer Father     Cancer Brother     No Known Problems Daughter     No Known Problems Daughter     No Known Problems Son        Social History:  reports that he quit smoking about 7 months ago. His smoking use included cigarettes. He started smoking about 50 years ago. He has a 12.5 pack-year smoking history. He has never used smokeless tobacco. He reports that he does not currently use alcohol. He reports that he does not use drugs.    Allergies:  Review of patient's allergies indicates:  No Known Allergies    Medications:  Current Outpatient Medications   Medication Sig Dispense Refill    acyclovir (ZOVIRAX) 400 MG tablet Take 1 tablet (400 mg total) by mouth 2 (two) times daily. 60 tablet 11    aspirin (ECOTRIN) 81 MG EC tablet Take 1 tablet (81 mg total) by mouth once daily. 30 tablet 12    atorvastatin (LIPITOR) 80 MG tablet Take 1 tablet (80 mg total) by mouth once daily. 90 tablet 3    carvediloL (COREG) 6.25 MG tablet TAKE 1 TABLET(6.25 MG) BY MOUTH TWICE DAILY  WITH MEALS 180 tablet 3    cilostazoL (PLETAL) 50 MG Tab Take 1 tablet (50 mg total) by mouth 2 (two) times daily. 180 tablet 3    docusate sodium (COLACE) 100 MG capsule Take 100 mg by mouth 2 (two) times daily as needed for Constipation.      gabapentin (NEURONTIN) 300 MG capsule Take 1 capsule (300 mg total) by mouth 3 (three) times daily. 90 capsule 11    levoFLOXacin (LEVAQUIN) 500 MG tablet Take 1 tablet (500 mg total) by mouth once daily. 30 tablet 11    promethazine (PHENERGAN) 25 MG tablet Take 1 tablet (25 mg total) by mouth every 8 (eight) hours as needed for Nausea. 40 tablet 5    venetoclax (VENCLEXTA) 100 mg Tab Take 1 tablet (100 mg) by mouth once daily on days 1-7 of a 28-day cycle. Do not discard any remaining tablets. 28 tablet 11    voriconazole (VFEND) 200 MG Tab Take 1 tablet (200 mg total) by mouth 2 (two) times daily. 60 tablet 11    zolpidem (AMBIEN) 10 mg Tab Take 10 mg by mouth nightly as needed.       No current facility-administered medications for this visit.       Review of Systems   Constitutional:  Positive for fatigue.   HENT:  Negative for sore throat.    Eyes:  Negative for visual disturbance.   Respiratory:  Negative for shortness of breath.    Cardiovascular:  Negative for chest pain.   Gastrointestinal:  Positive for nausea. Negative for abdominal pain.   Genitourinary:  Negative for dysuria.   Musculoskeletal:  Negative for back pain.   Skin:  Negative for rash.   Neurological:  Negative for weakness and headaches.   Hematological:  Negative for adenopathy.   Psychiatric/Behavioral:  The patient is not nervous/anxious.        ECOG Performance Status:   ECOG SCORE    1 - Restricted in strenuous activity-ambulatory and able to carry out work of a light nature         Objective:      Vitals:   Vitals:    10/13/23 0807   BP: 117/66   BP Location: Right arm   Patient Position: Sitting   BP Method: Medium (Automatic)   Pulse: 86   Resp: 16   SpO2: 97%   Weight: 77.1 kg (169 lb 15.6  "oz)     BMI: Body mass index is 25.1 kg/m².    Physical Exam  Vitals and nursing note reviewed.   Constitutional:       Appearance: He is well-developed.   HENT:      Head: Normocephalic and atraumatic.   Eyes:      Pupils: Pupils are equal, round, and reactive to light.   Cardiovascular:      Rate and Rhythm: Normal rate and regular rhythm.   Pulmonary:      Effort: Pulmonary effort is normal.      Breath sounds: Normal breath sounds.   Abdominal:      General: Bowel sounds are normal.      Palpations: Abdomen is soft.   Musculoskeletal:         General: Normal range of motion.      Cervical back: Normal range of motion and neck supple.   Skin:     General: Skin is warm and dry.   Neurological:      Mental Status: He is alert and oriented to person, place, and time.   Psychiatric:         Behavior: Behavior normal.         Thought Content: Thought content normal.         Judgment: Judgment normal.         Laboratory Data:  Labs have been reviewed.    Lab Results   Component Value Date    WBC 2.19 (L) 10/12/2023    HGB 14.6 10/12/2023    HCT 44.5 10/12/2023     (H) 10/12/2023     10/12/2023       Imaging:        Assessment:       1. 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 Immunodeficiency secondary to neoplasm    4. Anemia due to antineoplastic chemotherapy    5. Chemotherapy-induced neutropenia    6. Chemotherapy-induced nausea and vomiting           Plan:     Myelodysplastic syndrome / neutropenia due to chemo.  - bone marrow biopsy (4/26/23) revealed myelodysplastic syndrome.  - he met with Dr. Hickey on 5/8/23. He recommended repeat bone marrow biopsy.  - bone marrow biopsy (5/23/23) revealed myelodysplastic syndrome with excess blasts.  - he met with Dr. Hickey on 5/30/23. He recommended azacitidine (7-day) with venetoclax.  - he has received several blood transfusions in June 2023  - he met with Dr. Hickey on 6/20/23. Information per note:  "HCT-CI of 1 " "(intermediate risk). Will plan to proceed with transplant in the next 2-3 months (as soon as a donor is identified). HLA typing sent."  - bone marrow biopsy (7/3/23) revealed no increased blasts! He will follow up with Dr. Hickey on 7/11/23  - he has received blood/platelets on 7/5/23.  - he has not received blood/platelet transfusion in several weeks.  - he saw Dr. Hickey on 8/15/23. Per his note:  " Will plan to proceed with transplant ASAP (as soon as a donor is identified). HLA typing sent."  - he underwent bone marrow biopsy on 9/6/23.   - change vidaza to 5 days, and venetoclax from 14 days to 7 days every 28 days (due to cytopenias).  - plan is still for transplant once a donor is found.  - Labs have been reviewed. Absolute neutrophil count is 0.5 k/uL. Hemoglobin is 14.6 g/dL. Platelet count is 150 k/uL.  - will delay cycle #5 by one week due to neutropenia  - okay to proceed with cycle #5 of azacitidine/venetoclax, starting on on 10/23/23  - continue anti-infectious therapy per Dr. Hickey's plan.  - return to clinic in one month.    2. Chemotherapy-induced nausea/vomiting  - mild symptoms. Continue phenergan as needed.    3. Advance Care Planning     Power of   After our discussion (at previous visit), the patient has decided not to complete a HCPOA.       - return to clinic in one month.    Harry Diamond M.D.  Hematology/Oncology  [facility] [address], [address]  [place] [address]  Phone: [phone]  Fax: [phone]  "

## 2023-10-16 ENCOUNTER — LAB VISIT (OUTPATIENT)
Dept: LAB | Facility: HOSPITAL | Age: 68
End: 2023-10-16
Attending: INTERNAL MEDICINE
Payer: MEDICARE

## 2023-10-16 ENCOUNTER — TELEPHONE (OUTPATIENT)
Dept: INFUSION THERAPY | Facility: HOSPITAL | Age: 68
End: 2023-10-16
Payer: MEDICARE

## 2023-10-16 DIAGNOSIS — D46.9 MYELODYSPLASTIC SYNDROME: ICD-10-CM

## 2023-10-16 LAB
ABO + RH BLD: NORMAL
ANISOCYTOSIS BLD QL SMEAR: SLIGHT
BASOPHILS # BLD AUTO: ABNORMAL K/UL (ref 0–0.2)
BASOPHILS NFR BLD: 1 % (ref 0–1.9)
BLD GP AB SCN CELLS X3 SERPL QL: NORMAL
DIFFERENTIAL METHOD: ABNORMAL
EOSINOPHIL # BLD AUTO: ABNORMAL K/UL (ref 0–0.5)
EOSINOPHIL NFR BLD: 0 % (ref 0–8)
ERYTHROCYTE [DISTWIDTH] IN BLOOD BY AUTOMATED COUNT: 18.8 % (ref 11.5–14.5)
HCT VFR BLD AUTO: 41.9 % (ref 40–54)
HGB BLD-MCNC: 13.8 G/DL (ref 14–18)
IMM GRANULOCYTES # BLD AUTO: ABNORMAL K/UL (ref 0–0.04)
IMM GRANULOCYTES NFR BLD AUTO: ABNORMAL % (ref 0–0.5)
LYMPHOCYTES # BLD AUTO: ABNORMAL K/UL (ref 1–4.8)
LYMPHOCYTES NFR BLD: 63 % (ref 18–48)
MCH RBC QN AUTO: 33.7 PG (ref 27–31)
MCHC RBC AUTO-ENTMCNC: 32.9 G/DL (ref 32–36)
MCV RBC AUTO: 102 FL (ref 82–98)
MONOCYTES # BLD AUTO: ABNORMAL K/UL (ref 0.3–1)
MONOCYTES NFR BLD: 5 % (ref 4–15)
NEUTROPHILS NFR BLD: 31 % (ref 38–73)
NRBC BLD-RTO: 0 /100 WBC
OVALOCYTES BLD QL SMEAR: ABNORMAL
PLATELET # BLD AUTO: 195 K/UL (ref 150–450)
PLATELET BLD QL SMEAR: ABNORMAL
PMV BLD AUTO: 9.3 FL (ref 9.2–12.9)
POIKILOCYTOSIS BLD QL SMEAR: SLIGHT
RBC # BLD AUTO: 4.09 M/UL (ref 4.6–6.2)
SPECIMEN OUTDATE: NORMAL
WBC # BLD AUTO: 1.94 K/UL (ref 3.9–12.7)

## 2023-10-16 PROCEDURE — 86901 BLOOD TYPING SEROLOGIC RH(D): CPT | Performed by: INTERNAL MEDICINE

## 2023-10-16 PROCEDURE — 85007 BL SMEAR W/DIFF WBC COUNT: CPT | Performed by: INTERNAL MEDICINE

## 2023-10-16 PROCEDURE — 85027 COMPLETE CBC AUTOMATED: CPT | Performed by: INTERNAL MEDICINE

## 2023-10-16 PROCEDURE — 36415 COLL VENOUS BLD VENIPUNCTURE: CPT | Performed by: INTERNAL MEDICINE

## 2023-10-16 NOTE — TELEPHONE ENCOUNTER
Using language line ([place] [id number]) confirmed rescheduled appts per Dr. Diamond request to 10/23 at 8a with the patient

## 2023-10-18 DIAGNOSIS — M79.2 NEUROPATHIC PAIN: Primary | ICD-10-CM

## 2023-10-18 RX ORDER — GABAPENTIN 300 MG/1
300 CAPSULE ORAL 3 TIMES DAILY
Qty: 90 CAPSULE | Refills: 11 | Status: SHIPPED | OUTPATIENT
Start: 2023-10-18 | End: 2024-03-25 | Stop reason: SDUPTHER

## 2023-10-19 ENCOUNTER — LAB VISIT (OUTPATIENT)
Dept: LAB | Facility: HOSPITAL | Age: 68
End: 2023-10-19
Payer: MEDICARE

## 2023-10-19 DIAGNOSIS — D46.9 MYELODYSPLASTIC SYNDROME: ICD-10-CM

## 2023-10-19 LAB
ALBUMIN SERPL BCP-MCNC: 3.9 G/DL (ref 3.5–5.2)
ALP SERPL-CCNC: 66 U/L (ref 55–135)
ALT SERPL W/O P-5'-P-CCNC: 17 U/L (ref 10–44)
ANION GAP SERPL CALC-SCNC: 9 MMOL/L (ref 8–16)
ANISOCYTOSIS BLD QL SMEAR: SLIGHT
AST SERPL-CCNC: 19 U/L (ref 10–40)
BASOPHILS # BLD AUTO: 0.03 K/UL (ref 0–0.2)
BASOPHILS NFR BLD: 1.2 % (ref 0–1.9)
BILIRUB SERPL-MCNC: 0.3 MG/DL (ref 0.1–1)
BUN SERPL-MCNC: 16 MG/DL (ref 8–23)
CALCIUM SERPL-MCNC: 9.3 MG/DL (ref 8.7–10.5)
CHLORIDE SERPL-SCNC: 106 MMOL/L (ref 95–110)
CO2 SERPL-SCNC: 25 MMOL/L (ref 23–29)
CREAT SERPL-MCNC: 1.1 MG/DL (ref 0.5–1.4)
DIFFERENTIAL METHOD: ABNORMAL
EOSINOPHIL # BLD AUTO: 0.2 K/UL (ref 0–0.5)
EOSINOPHIL NFR BLD: 6.7 % (ref 0–8)
ERYTHROCYTE [DISTWIDTH] IN BLOOD BY AUTOMATED COUNT: 18.8 % (ref 11.5–14.5)
EST. GFR  (NO RACE VARIABLE): >60 ML/MIN/1.73 M^2
GLUCOSE SERPL-MCNC: 134 MG/DL (ref 70–110)
HCT VFR BLD AUTO: 42.4 % (ref 40–54)
HGB BLD-MCNC: 13.8 G/DL (ref 14–18)
HLATY INTERPRETATION: NORMAL
HYPOCHROMIA BLD QL SMEAR: ABNORMAL
IMM GRANULOCYTES # BLD AUTO: 0 K/UL (ref 0–0.04)
IMM GRANULOCYTES NFR BLD AUTO: 0 % (ref 0–0.5)
LDH SERPL L TO P-CCNC: 149 U/L (ref 110–260)
LYMPHOCYTES # BLD AUTO: 1.5 K/UL (ref 1–4.8)
LYMPHOCYTES NFR BLD: 57.3 % (ref 18–48)
MCH RBC QN AUTO: 33.8 PG (ref 27–31)
MCHC RBC AUTO-ENTMCNC: 32.5 G/DL (ref 32–36)
MCV RBC AUTO: 104 FL (ref 82–98)
MONOCYTES # BLD AUTO: 0.2 K/UL (ref 0.3–1)
MONOCYTES NFR BLD: 9 % (ref 4–15)
NEUTROPHILS # BLD AUTO: 0.7 K/UL (ref 1.8–7.7)
NEUTROPHILS NFR BLD: 25.8 % (ref 38–73)
NRBC BLD-RTO: 0 /100 WBC
OVALOCYTES BLD QL SMEAR: ABNORMAL
PLATELET # BLD AUTO: 199 K/UL (ref 150–450)
PLATELET BLD QL SMEAR: ABNORMAL
PMV BLD AUTO: 9.7 FL (ref 9.2–12.9)
POIKILOCYTOSIS BLD QL SMEAR: SLIGHT
POLYCHROMASIA BLD QL SMEAR: ABNORMAL
POTASSIUM SERPL-SCNC: 3.8 MMOL/L (ref 3.5–5.1)
PROT SERPL-MCNC: 7.2 G/DL (ref 6–8.4)
RBC # BLD AUTO: 4.08 M/UL (ref 4.6–6.2)
SODIUM SERPL-SCNC: 140 MMOL/L (ref 136–145)
URATE SERPL-MCNC: 4.9 MG/DL (ref 3.4–7)
WBC # BLD AUTO: 2.55 K/UL (ref 3.9–12.7)

## 2023-10-19 PROCEDURE — 83615 LACTATE (LD) (LDH) ENZYME: CPT | Performed by: INTERNAL MEDICINE

## 2023-10-19 PROCEDURE — 80053 COMPREHEN METABOLIC PANEL: CPT | Performed by: INTERNAL MEDICINE

## 2023-10-19 PROCEDURE — 85025 COMPLETE CBC W/AUTO DIFF WBC: CPT | Performed by: INTERNAL MEDICINE

## 2023-10-19 PROCEDURE — 36415 COLL VENOUS BLD VENIPUNCTURE: CPT | Performed by: INTERNAL MEDICINE

## 2023-10-19 PROCEDURE — 84550 ASSAY OF BLOOD/URIC ACID: CPT | Performed by: INTERNAL MEDICINE

## 2023-10-23 ENCOUNTER — INFUSION (OUTPATIENT)
Dept: INFUSION THERAPY | Facility: HOSPITAL | Age: 68
End: 2023-10-23
Attending: INTERNAL MEDICINE
Payer: MEDICARE

## 2023-10-23 ENCOUNTER — LAB VISIT (OUTPATIENT)
Dept: LAB | Facility: HOSPITAL | Age: 68
End: 2023-10-23
Payer: MEDICARE

## 2023-10-23 VITALS
RESPIRATION RATE: 17 BRPM | SYSTOLIC BLOOD PRESSURE: 129 MMHG | DIASTOLIC BLOOD PRESSURE: 79 MMHG | WEIGHT: 170.75 LBS | HEART RATE: 65 BPM | BODY MASS INDEX: 25.29 KG/M2 | HEIGHT: 69 IN | OXYGEN SATURATION: 96 % | TEMPERATURE: 98 F

## 2023-10-23 DIAGNOSIS — D46.9 MYELODYSPLASTIC SYNDROME: Primary | ICD-10-CM

## 2023-10-23 DIAGNOSIS — D46.9 MYELODYSPLASTIC SYNDROME: ICD-10-CM

## 2023-10-23 DIAGNOSIS — D64.9 SYMPTOMATIC ANEMIA: ICD-10-CM

## 2023-10-23 LAB
ABO + RH BLD: NORMAL
ANISOCYTOSIS BLD QL SMEAR: SLIGHT
BASOPHILS # BLD AUTO: ABNORMAL K/UL (ref 0–0.2)
BASOPHILS NFR BLD: 1 % (ref 0–1.9)
BLD GP AB SCN CELLS X3 SERPL QL: NORMAL
DIFFERENTIAL METHOD: ABNORMAL
EOSINOPHIL # BLD AUTO: ABNORMAL K/UL (ref 0–0.5)
EOSINOPHIL NFR BLD: 6 % (ref 0–8)
ERYTHROCYTE [DISTWIDTH] IN BLOOD BY AUTOMATED COUNT: 18.6 % (ref 11.5–14.5)
HCT VFR BLD AUTO: 41.7 % (ref 40–54)
HGB BLD-MCNC: 13.9 G/DL (ref 14–18)
IMM GRANULOCYTES # BLD AUTO: ABNORMAL K/UL (ref 0–0.04)
IMM GRANULOCYTES NFR BLD AUTO: ABNORMAL % (ref 0–0.5)
LYMPHOCYTES # BLD AUTO: ABNORMAL K/UL (ref 1–4.8)
LYMPHOCYTES NFR BLD: 50 % (ref 18–48)
MAGNESIUM SERPL-MCNC: 2.1 MG/DL (ref 1.6–2.6)
MCH RBC QN AUTO: 33.6 PG (ref 27–31)
MCHC RBC AUTO-ENTMCNC: 33.3 G/DL (ref 32–36)
MCV RBC AUTO: 101 FL (ref 82–98)
MONOCYTES # BLD AUTO: ABNORMAL K/UL (ref 0.3–1)
MONOCYTES NFR BLD: 8 % (ref 4–15)
NEUTROPHILS NFR BLD: 33 % (ref 38–73)
NEUTS BAND NFR BLD MANUAL: 2 %
NRBC BLD-RTO: 0 /100 WBC
OVALOCYTES BLD QL SMEAR: ABNORMAL
PLATELET # BLD AUTO: 177 K/UL (ref 150–450)
PLATELET BLD QL SMEAR: ABNORMAL
PMV BLD AUTO: 9.8 FL (ref 9.2–12.9)
POIKILOCYTOSIS BLD QL SMEAR: SLIGHT
RBC # BLD AUTO: 4.14 M/UL (ref 4.6–6.2)
SPECIMEN OUTDATE: NORMAL
WBC # BLD AUTO: 2.71 K/UL (ref 3.9–12.7)

## 2023-10-23 PROCEDURE — 63600175 PHARM REV CODE 636 W HCPCS: Performed by: INTERNAL MEDICINE

## 2023-10-23 PROCEDURE — 86901 BLOOD TYPING SEROLOGIC RH(D): CPT | Performed by: INTERNAL MEDICINE

## 2023-10-23 PROCEDURE — 96401 CHEMO ANTI-NEOPL SQ/IM: CPT

## 2023-10-23 PROCEDURE — 85027 COMPLETE CBC AUTOMATED: CPT | Performed by: INTERNAL MEDICINE

## 2023-10-23 PROCEDURE — 85007 BL SMEAR W/DIFF WBC COUNT: CPT | Performed by: INTERNAL MEDICINE

## 2023-10-23 PROCEDURE — 25000003 PHARM REV CODE 250: Performed by: INTERNAL MEDICINE

## 2023-10-23 PROCEDURE — 83735 ASSAY OF MAGNESIUM: CPT | Performed by: INTERNAL MEDICINE

## 2023-10-23 RX ORDER — AZACITIDINE 100 MG/1
75 INJECTION, POWDER, LYOPHILIZED, FOR SOLUTION INTRAVENOUS; SUBCUTANEOUS
Status: COMPLETED | OUTPATIENT
Start: 2023-10-23 | End: 2023-10-23

## 2023-10-23 RX ORDER — ONDANSETRON HYDROCHLORIDE 8 MG/1
16 TABLET, FILM COATED ORAL
Status: COMPLETED | OUTPATIENT
Start: 2023-10-23 | End: 2023-10-23

## 2023-10-23 RX ADMIN — AZACITIDINE 140 MG: 100 INJECTION, POWDER, LYOPHILIZED, FOR SOLUTION INTRAVENOUS; SUBCUTANEOUS at 08:10

## 2023-10-23 RX ADMIN — ONDANSETRON HYDROCHLORIDE 16 MG: 8 TABLET, FILM COATED ORAL at 08:10

## 2023-10-23 NOTE — NURSING
Patient tolerated Cycle 5 Day1 Vidaza injection well, VSS, no complaints at this time. Pt d/c in no acute distress.

## 2023-10-24 ENCOUNTER — OFFICE VISIT (OUTPATIENT)
Dept: HEMATOLOGY/ONCOLOGY | Facility: CLINIC | Age: 68
End: 2023-10-24
Payer: MEDICARE

## 2023-10-24 ENCOUNTER — INFUSION (OUTPATIENT)
Dept: INFUSION THERAPY | Facility: HOSPITAL | Age: 68
End: 2023-10-24
Attending: INTERNAL MEDICINE
Payer: MEDICARE

## 2023-10-24 VITALS
HEART RATE: 75 BPM | SYSTOLIC BLOOD PRESSURE: 131 MMHG | RESPIRATION RATE: 18 BRPM | WEIGHT: 170.94 LBS | TEMPERATURE: 98 F | BODY MASS INDEX: 25.32 KG/M2 | HEIGHT: 69 IN | DIASTOLIC BLOOD PRESSURE: 69 MMHG | OXYGEN SATURATION: 97 %

## 2023-10-24 VITALS
OXYGEN SATURATION: 98 % | HEART RATE: 75 BPM | HEIGHT: 69 IN | DIASTOLIC BLOOD PRESSURE: 64 MMHG | SYSTOLIC BLOOD PRESSURE: 125 MMHG | TEMPERATURE: 98 F | RESPIRATION RATE: 18 BRPM | BODY MASS INDEX: 25.32 KG/M2 | WEIGHT: 170.94 LBS

## 2023-10-24 DIAGNOSIS — D61.818 PANCYTOPENIA: ICD-10-CM

## 2023-10-24 DIAGNOSIS — D46.9 MYELODYSPLASTIC SYNDROME: Primary | ICD-10-CM

## 2023-10-24 DIAGNOSIS — Z76.82 STEM CELL TRANSPLANT CANDIDATE: ICD-10-CM

## 2023-10-24 DIAGNOSIS — Z79.899 IMMUNODEFICIENCY DUE TO DRUG THERAPY: ICD-10-CM

## 2023-10-24 DIAGNOSIS — D84.821 IMMUNODEFICIENCY DUE TO DRUG THERAPY: ICD-10-CM

## 2023-10-24 PROCEDURE — 1125F AMNT PAIN NOTED PAIN PRSNT: CPT | Mod: CPTII,S$GLB,, | Performed by: INTERNAL MEDICINE

## 2023-10-24 PROCEDURE — 1160F RVW MEDS BY RX/DR IN RCRD: CPT | Mod: CPTII,S$GLB,, | Performed by: INTERNAL MEDICINE

## 2023-10-24 PROCEDURE — 3078F DIAST BP <80 MM HG: CPT | Mod: CPTII,S$GLB,, | Performed by: INTERNAL MEDICINE

## 2023-10-24 PROCEDURE — 1125F PR PAIN SEVERITY QUANTIFIED, PAIN PRESENT: ICD-10-PCS | Mod: CPTII,S$GLB,, | Performed by: INTERNAL MEDICINE

## 2023-10-24 PROCEDURE — 1101F PR PT FALLS ASSESS DOC 0-1 FALLS W/OUT INJ PAST YR: ICD-10-PCS | Mod: CPTII,S$GLB,, | Performed by: INTERNAL MEDICINE

## 2023-10-24 PROCEDURE — 3075F SYST BP GE 130 - 139MM HG: CPT | Mod: CPTII,S$GLB,, | Performed by: INTERNAL MEDICINE

## 2023-10-24 PROCEDURE — 3008F PR BODY MASS INDEX (BMI) DOCUMENTED: ICD-10-PCS | Mod: CPTII,S$GLB,, | Performed by: INTERNAL MEDICINE

## 2023-10-24 PROCEDURE — 3078F PR MOST RECENT DIASTOLIC BLOOD PRESSURE < 80 MM HG: ICD-10-PCS | Mod: CPTII,S$GLB,, | Performed by: INTERNAL MEDICINE

## 2023-10-24 PROCEDURE — 3008F BODY MASS INDEX DOCD: CPT | Mod: CPTII,S$GLB,, | Performed by: INTERNAL MEDICINE

## 2023-10-24 PROCEDURE — 3075F PR MOST RECENT SYSTOLIC BLOOD PRESS GE 130-139MM HG: ICD-10-PCS | Mod: CPTII,S$GLB,, | Performed by: INTERNAL MEDICINE

## 2023-10-24 PROCEDURE — 99999 PR PBB SHADOW E&M-EST. PATIENT-LVL III: CPT | Mod: PBBFAC,,, | Performed by: INTERNAL MEDICINE

## 2023-10-24 PROCEDURE — 1160F PR REVIEW ALL MEDS BY PRESCRIBER/CLIN PHARMACIST DOCUMENTED: ICD-10-PCS | Mod: CPTII,S$GLB,, | Performed by: INTERNAL MEDICINE

## 2023-10-24 PROCEDURE — 99999 PR PBB SHADOW E&M-EST. PATIENT-LVL III: ICD-10-PCS | Mod: PBBFAC,,, | Performed by: INTERNAL MEDICINE

## 2023-10-24 PROCEDURE — 1159F MED LIST DOCD IN RCRD: CPT | Mod: CPTII,S$GLB,, | Performed by: INTERNAL MEDICINE

## 2023-10-24 PROCEDURE — 3288F PR FALLS RISK ASSESSMENT DOCUMENTED: ICD-10-PCS | Mod: CPTII,S$GLB,, | Performed by: INTERNAL MEDICINE

## 2023-10-24 PROCEDURE — 1101F PT FALLS ASSESS-DOCD LE1/YR: CPT | Mod: CPTII,S$GLB,, | Performed by: INTERNAL MEDICINE

## 2023-10-24 PROCEDURE — 63600175 PHARM REV CODE 636 W HCPCS: Performed by: INTERNAL MEDICINE

## 2023-10-24 PROCEDURE — 99215 PR OFFICE/OUTPT VISIT, EST, LEVL V, 40-54 MIN: ICD-10-PCS | Mod: S$GLB,,, | Performed by: INTERNAL MEDICINE

## 2023-10-24 PROCEDURE — 96401 CHEMO ANTI-NEOPL SQ/IM: CPT

## 2023-10-24 PROCEDURE — 3288F FALL RISK ASSESSMENT DOCD: CPT | Mod: CPTII,S$GLB,, | Performed by: INTERNAL MEDICINE

## 2023-10-24 PROCEDURE — 25000003 PHARM REV CODE 250: Performed by: INTERNAL MEDICINE

## 2023-10-24 PROCEDURE — 1159F PR MEDICATION LIST DOCUMENTED IN MEDICAL RECORD: ICD-10-PCS | Mod: CPTII,S$GLB,, | Performed by: INTERNAL MEDICINE

## 2023-10-24 PROCEDURE — 99215 OFFICE O/P EST HI 40 MIN: CPT | Mod: S$GLB,,, | Performed by: INTERNAL MEDICINE

## 2023-10-24 RX ORDER — AZACITIDINE 100 MG/1
75 INJECTION, POWDER, LYOPHILIZED, FOR SOLUTION INTRAVENOUS; SUBCUTANEOUS
Status: COMPLETED | OUTPATIENT
Start: 2023-10-24 | End: 2023-10-24

## 2023-10-24 RX ORDER — ONDANSETRON HYDROCHLORIDE 8 MG/1
16 TABLET, FILM COATED ORAL
Status: COMPLETED | OUTPATIENT
Start: 2023-10-24 | End: 2023-10-24

## 2023-10-24 RX ADMIN — AZACITIDINE 140 MG: 100 INJECTION, POWDER, LYOPHILIZED, FOR SOLUTION INTRAVENOUS; SUBCUTANEOUS at 01:10

## 2023-10-24 RX ADMIN — ONDANSETRON HYDROCHLORIDE 16 MG: 8 TABLET, FILM COATED ORAL at 12:10

## 2023-10-24 NOTE — PROGRESS NOTES
Section of Hematology and Stem Cell Transplantation  Follow Up Visit     Date of visit: 10/24/23  Visit diagnosis: No primary diagnosis found.  Referred by:  Harry Diamond MD    Oncologic History:     Primary Oncologic Diagnosis: Myelodysplastic syndrome excess blasts 2, IPSS-M very high risk    4/12/23: Initial evaluation by Dr. Diamond of anemia. WBC 2.71, Hgb 7.1, Plts  400. Prior to this visit, he was in [place] for a dental procedure. His symptoms of fatigue started after extractions. Workup by Dr. Diamond unremarkable.   4/26/23: Bone marrow biopsy revealed a hypercellular marrow (80-90%) with increased blasts (8-12%) concerning for MDS EB2. However, sample was limited.   5/23/23: Repeat bone marrow biopsy revealed myelodysplastic syndrome with excess blasts 2 (blasts 16-17%). 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 NGS with ASXL1 (44%), EZH2 (87%), IDH2 (42%), STAG2 (89%), U2AF1 (3%).  6/12/23: Cycle 1 of azacitidine 75 mg/m2 plus venetoclax x14 of 28 days.   7/3/23: Bone marrow biopsy at the end of cycle 1 revealed a hypocellular marrow, no blasts, trilineage hypoplasia and dyspoiesis with increased micromegakaryocytes. FISH with trisomy 8 (three copies of [id number]). NGS with ASXL1 (20%), EZH2 (40%), IDH2 (17%), STAG2 (38%).  9/6/23: Bone marrow biopsy revealed dysplastic changes but blasts were not increased. Diploid karyotype. NGS with ASXL1 (16%).    History of Present Ilness:   Guillaume Salinas (Guillaume) is a pleasant 68 y.o.male with PVD, CAD, HTN who presents for follow up. He feels great at this time. He is planning on traveling back to [place] at some point in the near future. No new side effects from treatment.     Patient was seen today in conjunction with a .    PAST MEDICAL HISTORY:   Past Medical History:   Diagnosis Date    Anticoagulant long-term use     Coronary artery disease     Hypertension     Peripheral vascular disease, unspecified        PAST SURGICAL HISTORY:    Past Surgical History:   Procedure Laterality Date    BONE MARROW BIOPSY Left 2023    Procedure: Biopsy-bone marrow;  Surgeon: Harry Diamond MD;  Location: [facility] OR;  Service: Oncology;  Laterality: Left;    COLONOSCOPY N/A 2022    Procedure: COLONOSCOPY Golytely Vaccinated will bring cards;  Surgeon: Dereje Simon MD;  Location: [facility] ENDO;  Service: Endoscopy;  Laterality: N/A;  Do not cancel this order       PAST SOCIAL HISTORY:  Social History     Tobacco Use    Smoking status: Former     Current packs/day: 0.00     Average packs/day: 0.3 packs/day for 50.0 years (12.5 ttl pk-yrs)     Types: Cigarettes     Start date: 3/1/1973     Quit date: 3/1/2023     Years since quittin.6    Smokeless tobacco: Never   Substance Use Topics    Alcohol use: Not Currently    Drug use: Never       FAMILY HISTORY:  Family History   Problem Relation Age of Onset    Hypertension Mother     Cancer Father     Cancer Brother     No Known Problems Daughter     No Known Problems Daughter     No Known Problems Son        CURRENT MEDICATIONS:   Current Outpatient Medications   Medication Sig    acyclovir (ZOVIRAX) 400 MG tablet Take 1 tablet (400 mg total) by mouth 2 (two) times daily.    aspirin (ECOTRIN) 81 MG EC tablet Take 1 tablet (81 mg total) by mouth once daily.    atorvastatin (LIPITOR) 80 MG tablet Take 1 tablet (80 mg total) by mouth once daily.    carvediloL (COREG) 6.25 MG tablet TAKE 1 TABLET(6.25 MG) BY MOUTH TWICE DAILY WITH MEALS    cilostazoL (PLETAL) 50 MG Tab Take 1 tablet (50 mg total) by mouth 2 (two) times daily.    docusate sodium (COLACE) 100 MG capsule Take 100 mg by mouth 2 (two) times daily as needed for Constipation.    gabapentin (NEURONTIN) 300 MG capsule TAKE 1 CAPSULE(300 MG) BY MOUTH THREE TIMES DAILY    levoFLOXacin (LEVAQUIN) 500 MG tablet Take 1 tablet (500 mg total) by mouth once daily.    promethazine (PHENERGAN) 25 MG tablet Take 1 tablet (25 mg total) by mouth every 8  (eight) hours as needed for Nausea.    venetoclax (VENCLEXTA) 100 mg Tab Take 1 tablet (100 mg) by mouth once daily on days 1-7 of a 28-day cycle. Do not discard any remaining tablets.    voriconazole (VFEND) 200 MG Tab Take 1 tablet (200 mg total) by mouth 2 (two) times daily.    zolpidem (AMBIEN) 10 mg Tab Take 10 mg by mouth nightly as needed.     No current facility-administered medications for this visit.       ALLERGIES:   Review of patient's allergies indicates:  No Known Allergies    Review of Systems:     Pertinent positives and negatives included in the HPI. Otherwise a complete review of systems is negative.    Physical Exam:     Vitals:    10/24/23 0836   BP: 131/69   Pulse: 75   Resp: 18   Temp: 97.5 °F (36.4 °C)     General: Appears well, NAD  Pulmonary: CTAB, no increased work of breathing, no W/R/C  Cardiovascular: S1S2 normal, RRR, no M/R/G  Abdominal: Soft, NT, ND, BS+, no HSM  Extremities: No C/C/E  Neurological: AAOx4, grossly normal, no focal deficits  Dermatologic: No appreciable rashes or lesions  Lymphatic: No cervical, axillary, or inguinal lymphadenopathy     ECOG Performance Status: (foot note - ECOG PS provided by [employer] Cooperative Oncology Group) 1 - Symptomatic but completely ambulatory    Karnofsky Performance Score:  80%- Normal Activity with Effort: Some Symptoms of Disease    Labs:   Lab Results   Component Value Date    WBC 2.71 (L) 10/23/2023    RBC 4.14 (L) 10/23/2023    HGB 13.9 (L) 10/23/2023    HCT 41.7 10/23/2023     (H) 10/23/2023    MCH 33.6 (H) 10/23/2023    MCHC 33.3 10/23/2023    RDW 18.6 (H) 10/23/2023     10/23/2023    MPV 9.8 10/23/2023    GRAN 33.0 (L) 10/23/2023    LYMPH CANCELED 10/23/2023    LYMPH 50.0 (H) 10/23/2023    MONO CANCELED 10/23/2023    MONO 8.0 10/23/2023    EOS CANCELED 10/23/2023    BASO CANCELED 10/23/2023    EOSINOPHIL 6.0 10/23/2023    BASOPHIL 1.0 10/23/2023       CMP  Sodium   Date Value Ref Range Status   10/19/2023 140 136 -  145 mmol/L Final     Potassium   Date Value Ref Range Status   10/19/2023 3.8 3.5 - 5.1 mmol/L Final     Chloride   Date Value Ref Range Status   10/19/2023 106 95 - 110 mmol/L Final     CO2   Date Value Ref Range Status   10/19/2023 25 23 - 29 mmol/L Final     Glucose   Date Value Ref Range Status   10/19/2023 134 (H) 70 - 110 mg/dL Final     BUN   Date Value Ref Range Status   10/19/2023 16 8 - 23 mg/dL Final     Creatinine   Date Value Ref Range Status   10/19/2023 1.1 0.5 - 1.4 mg/dL Final     Calcium   Date Value Ref Range Status   10/19/2023 9.3 8.7 - 10.5 mg/dL Final     Total Protein   Date Value Ref Range Status   10/19/2023 7.2 6.0 - 8.4 g/dL Final     Albumin   Date Value Ref Range Status   10/19/2023 3.9 3.5 - 5.2 g/dL Final     Total Bilirubin   Date Value Ref Range Status   10/19/2023 0.3 0.1 - 1.0 mg/dL Final     Comment:     For infants and newborns, interpretation of results should be based  on gestational age, weight and in agreement with clinical  observations.    Premature Infant recommended reference ranges:  Up to 24 hours.............<8.0 mg/dL  Up to 48 hours............<12.0 mg/dL  3-5 days..................<15.0 mg/dL  6-29 days.................<15.0 mg/dL       Alkaline Phosphatase   Date Value Ref Range Status   10/19/2023 66 55 - 135 U/L Final     AST   Date Value Ref Range Status   10/19/2023 19 10 - 40 U/L Final     ALT   Date Value Ref Range Status   10/19/2023 17 10 - 44 U/L Final     Anion Gap   Date Value Ref Range Status   10/19/2023 9 8 - 16 mmol/L Final     eGFR if    Date Value Ref Range Status   08/27/2021 >60 >60 mL/min/1.73 m^2 Final   08/27/2021 >60 >60 mL/min/1.73 m^2 Final   08/27/2021 >60 >60 mL/min/1.73 m^2 Final     eGFR if non    Date Value Ref Range Status   08/27/2021 57 (A) >60 mL/min/1.73 m^2 Final     Comment:     Calculation used to obtain the estimated glomerular filtration  rate (eGFR) is the CKD-EPI equation.      08/27/2021 57  (A) >60 mL/min/1.73 m^2 Final     Comment:     Calculation used to obtain the estimated glomerular filtration  rate (eGFR) is the CKD-EPI equation.      08/27/2021 57 (A) >60 mL/min/1.73 m^2 Final     Comment:     Calculation used to obtain the estimated glomerular filtration  rate (eGFR) is the CKD-EPI equation.          Imaging:   No results found for this or any previous visit (from the past 2160 hour(s)).    Pathology:  Reviewed     Assessment and Plan:   Guillaume Salinas (Guillaume) is a pleasant 68 y.o.male with PVD, CAD, HTN who presents for follow up.    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 NGS with ASXL1 (44%), EZH2 (87%), IDH2 (42%), STAG2 (89%), U2AF1 (3%). He started treatment with azacitidine 75 mg/m2 daily x7 days plus venetoclax 100mg (voriconazole) daily x14 of 28 days. Venetoclax decreased to 7 days with cycle 3. Bone marrow biopsy 9/6/23 revealed dysplastic changes but blasts were not increased. Diploid karyotype. NGS with ASXL1 (16%)..  Continue azacitidine x5 days plus venetoclax x7 of 28 days as above. Will delay next cycle to start 12/11/23 to allow for travel to [place].  Prior to starting each cycle - ANC >500 and Plts >50.    Stem cell transplant candidate: We discussed the role for allogeneic stem cell transplantation in this disease process as a potentially curative option. We had an extensive discussion about the rationale, logistics, risks, and benefits. We reviewed the requirement to stay in the [place] area for 100 days with a caregiver at all times. We discussed the risks, including infection, graft failure, organ toxicity, graft versus host disease, relapse of disease, and secondary cancers. We reviewed the need for long-term immunosuppression and need for close monitoring. HCT-CI of 1 (intermediate risk). Will plan to proceed with transplant ASAP (as soon as a donor is identified). Awaiting HLA  results.  Coordinator: Fay Stephens  Regimen: Likely   Donor: pending  Graft source: pending donor    Stem cell donors: 10 siblings (9 are over age 65). One sister (age 63) - Radha. Segundo Magdaleno (age 43), Padmini Rush (age 39), Susy (age 35).   No fully matched MUDs available.   [facility] children available as is his brother.   Awaiting sister's HLA results.     Symptomatic anemia: Related to MDS. Twice weekly monitoring in [place]. Transfuse for Hgb <7.    Thrombocytopenia: Related to MDS. Monitor and transfuse for Plts <10.    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     Orders/Follow Up:           Route Chart for Scheduling    BMT Chart Routing      Follow up with physician Other. RTC in 7 weeks (around 12/12/23)   Follow up with [facility]    Provider visit type    Infusion scheduling note    Injection scheduling note Next azacitidine daily x5 days in [place] starting 12/11/23 (adjust schedule)   Labs CBC, CMP, phosphorus, magnesium and type and screen   Scheduling:  Preferred lab:  Lab interval: once a week  weekly labs in [place]   Imaging    Pharmacy appointment    Other referrals                Treatment Plan Information   OP AZACITIDINE 7-DAY (SUB-Q)   Harry Diamond MD   Upcoming Treatment Dates - OP AZACITIDINE 7-DAY (SUB-Q)    10/24/2023       Pre-Medications       ondansetron tablet 16 mg       Chemotherapy       azaCITIDine (VIDAZA) chemo injection 140 mg  10/25/2023       Pre-Medications       ondansetron tablet 16 mg       Chemotherapy       azaCITIDine (VIDAZA) chemo injection 140 mg  10/26/2023       Pre-Medications       ondansetron tablet 16 mg       Chemotherapy       azaCITIDine (VIDAZA) chemo injection 140 mg  10/27/2023       Pre-Medications       ondansetron tablet 16 mg       Chemotherapy       azaCITIDine (VIDAZA) chemo injection 140 mg    Advance Care Planning   Date: 05/08/2023  We reviewed his underlying diagnosis including natural history,  prognosis, and various treatment options. He remains interested in pursuing any and all treatment options in an effort to improve his quality and quantity of life. Will discuss treatment options pending biopsy results.        Total time of this visit was 40 minutes, including time spent face to face with patient, and also in preparing for today's visit for MDM and documentation. (Medical Decision Making, including consideration of possible diagnoses, management options, complex medical record review, review of diagnostic tests and information, consideration and discussion of significant complications based on comorbidities, and discussion with providers involved with the care of the patient). Greater than 50% was spent face to face with the patient counseling and coordinating care.    Paco Hickey MD  Hematology, Oncology, and Stem Cell Transplantation  [facility]

## 2023-10-24 NOTE — NURSING
Patient tolerated Cycle 5 Day 2 Vidaza injection well, VSS, no complaints at this time. Next appointment scheduled and pt d/c in no acute distress.

## 2023-10-24 NOTE — Clinical Note
He wants to travel to [place], so I told him we could push his next cycle back 3 weeks. So his next cycle would start 12/11/23. Just FYI!

## 2023-10-25 ENCOUNTER — INFUSION (OUTPATIENT)
Dept: INFUSION THERAPY | Facility: HOSPITAL | Age: 68
End: 2023-10-25
Attending: INTERNAL MEDICINE
Payer: MEDICARE

## 2023-10-25 ENCOUNTER — TELEPHONE (OUTPATIENT)
Dept: INFUSION THERAPY | Facility: HOSPITAL | Age: 68
End: 2023-10-25
Payer: MEDICARE

## 2023-10-25 VITALS
WEIGHT: 170.94 LBS | TEMPERATURE: 99 F | HEART RATE: 76 BPM | SYSTOLIC BLOOD PRESSURE: 135 MMHG | HEIGHT: 69 IN | DIASTOLIC BLOOD PRESSURE: 70 MMHG | RESPIRATION RATE: 18 BRPM | BODY MASS INDEX: 25.32 KG/M2 | OXYGEN SATURATION: 98 %

## 2023-10-25 DIAGNOSIS — D46.9 MYELODYSPLASTIC SYNDROME: Primary | ICD-10-CM

## 2023-10-25 PROCEDURE — 96401 CHEMO ANTI-NEOPL SQ/IM: CPT

## 2023-10-25 PROCEDURE — 63600175 PHARM REV CODE 636 W HCPCS: Performed by: INTERNAL MEDICINE

## 2023-10-25 PROCEDURE — 25000003 PHARM REV CODE 250: Performed by: INTERNAL MEDICINE

## 2023-10-25 RX ORDER — AZACITIDINE 100 MG/1
75 INJECTION, POWDER, LYOPHILIZED, FOR SOLUTION INTRAVENOUS; SUBCUTANEOUS
Status: COMPLETED | OUTPATIENT
Start: 2023-10-25 | End: 2023-10-25

## 2023-10-25 RX ORDER — ONDANSETRON HYDROCHLORIDE 8 MG/1
16 TABLET, FILM COATED ORAL
Status: COMPLETED | OUTPATIENT
Start: 2023-10-25 | End: 2023-10-25

## 2023-10-25 RX ADMIN — ONDANSETRON HYDROCHLORIDE 16 MG: 8 TABLET, FILM COATED ORAL at 12:10

## 2023-10-25 RX ADMIN — AZACITIDINE 140 MG: 100 INJECTION, POWDER, LYOPHILIZED, FOR SOLUTION INTRAVENOUS; SUBCUTANEOUS at 12:10

## 2023-10-26 ENCOUNTER — INFUSION (OUTPATIENT)
Dept: INFUSION THERAPY | Facility: HOSPITAL | Age: 68
End: 2023-10-26
Attending: INTERNAL MEDICINE
Payer: MEDICARE

## 2023-10-26 VITALS
HEIGHT: 69 IN | HEART RATE: 85 BPM | BODY MASS INDEX: 25.32 KG/M2 | OXYGEN SATURATION: 96 % | WEIGHT: 170.94 LBS | TEMPERATURE: 99 F | RESPIRATION RATE: 18 BRPM | SYSTOLIC BLOOD PRESSURE: 132 MMHG | DIASTOLIC BLOOD PRESSURE: 83 MMHG

## 2023-10-26 DIAGNOSIS — D46.9 MYELODYSPLASTIC SYNDROME: Primary | ICD-10-CM

## 2023-10-26 PROCEDURE — 25000003 PHARM REV CODE 250: Performed by: INTERNAL MEDICINE

## 2023-10-26 PROCEDURE — 63600175 PHARM REV CODE 636 W HCPCS: Performed by: INTERNAL MEDICINE

## 2023-10-26 PROCEDURE — 96401 CHEMO ANTI-NEOPL SQ/IM: CPT

## 2023-10-26 RX ORDER — AZACITIDINE 100 MG/1
75 INJECTION, POWDER, LYOPHILIZED, FOR SOLUTION INTRAVENOUS; SUBCUTANEOUS
Status: COMPLETED | OUTPATIENT
Start: 2023-10-26 | End: 2023-10-26

## 2023-10-26 RX ORDER — ONDANSETRON HYDROCHLORIDE 8 MG/1
16 TABLET, FILM COATED ORAL
Status: COMPLETED | OUTPATIENT
Start: 2023-10-26 | End: 2023-10-26

## 2023-10-26 RX ADMIN — ONDANSETRON HYDROCHLORIDE 16 MG: 8 TABLET, FILM COATED ORAL at 08:10

## 2023-10-26 RX ADMIN — AZACITIDINE 140 MG: 100 INJECTION, POWDER, LYOPHILIZED, FOR SOLUTION INTRAVENOUS; SUBCUTANEOUS at 09:10

## 2023-10-26 NOTE — NURSING
Patient tolerated Cycle 5 Day 4 Vidaza injection well, pt reports feeling generalized stiffness, VSS, no other complaints at this time. Next appointment scheduled. Treatment calendar printed and reviewed. Pt d/c in no acute distress.

## 2023-10-27 ENCOUNTER — INFUSION (OUTPATIENT)
Dept: INFUSION THERAPY | Facility: HOSPITAL | Age: 68
End: 2023-10-27
Attending: INTERNAL MEDICINE
Payer: MEDICARE

## 2023-10-27 VITALS
TEMPERATURE: 98 F | OXYGEN SATURATION: 96 % | HEART RATE: 85 BPM | DIASTOLIC BLOOD PRESSURE: 77 MMHG | SYSTOLIC BLOOD PRESSURE: 120 MMHG | WEIGHT: 170.94 LBS | HEIGHT: 69 IN | BODY MASS INDEX: 25.32 KG/M2 | RESPIRATION RATE: 18 BRPM

## 2023-10-27 DIAGNOSIS — D46.9 MYELODYSPLASTIC SYNDROME: Primary | ICD-10-CM

## 2023-10-27 PROCEDURE — 25000003 PHARM REV CODE 250: Performed by: INTERNAL MEDICINE

## 2023-10-27 PROCEDURE — 96401 CHEMO ANTI-NEOPL SQ/IM: CPT

## 2023-10-27 PROCEDURE — 63600175 PHARM REV CODE 636 W HCPCS: Performed by: INTERNAL MEDICINE

## 2023-10-27 RX ORDER — ONDANSETRON HYDROCHLORIDE 8 MG/1
16 TABLET, FILM COATED ORAL
Status: COMPLETED | OUTPATIENT
Start: 2023-10-27 | End: 2023-10-27

## 2023-10-27 RX ORDER — AZACITIDINE 100 MG/1
75 INJECTION, POWDER, LYOPHILIZED, FOR SOLUTION INTRAVENOUS; SUBCUTANEOUS
Status: COMPLETED | OUTPATIENT
Start: 2023-10-27 | End: 2023-10-27

## 2023-10-27 RX ADMIN — AZACITIDINE 140 MG: 100 INJECTION, POWDER, LYOPHILIZED, FOR SOLUTION INTRAVENOUS; SUBCUTANEOUS at 09:10

## 2023-10-27 RX ADMIN — ONDANSETRON HYDROCHLORIDE 16 MG: 8 TABLET, FILM COATED ORAL at 08:10

## 2023-10-27 NOTE — NURSING
Patient tolerated Cycle 5 Day 5 Vidaza injection well, VSS, Next appointment scheduled, treatment calendar printed and reviewed with pt. Pt d/c in no acute distress.

## 2023-10-30 ENCOUNTER — LAB VISIT (OUTPATIENT)
Dept: LAB | Facility: HOSPITAL | Age: 68
End: 2023-10-30
Payer: MEDICARE

## 2023-10-30 DIAGNOSIS — D46.9 MYELODYSPLASTIC SYNDROME: ICD-10-CM

## 2023-10-30 DIAGNOSIS — D64.9 SYMPTOMATIC ANEMIA: ICD-10-CM

## 2023-10-30 DIAGNOSIS — D64.9 ANEMIA, UNSPECIFIED TYPE: ICD-10-CM

## 2023-10-30 LAB
ABO + RH BLD: NORMAL
ALBUMIN SERPL BCP-MCNC: 4.1 G/DL (ref 3.5–5.2)
ALP SERPL-CCNC: 71 U/L (ref 55–135)
ALT SERPL W/O P-5'-P-CCNC: 18 U/L (ref 10–44)
ANION GAP SERPL CALC-SCNC: 13 MMOL/L (ref 8–16)
AST SERPL-CCNC: 16 U/L (ref 10–40)
BASOPHILS # BLD AUTO: 0.01 K/UL (ref 0–0.2)
BASOPHILS NFR BLD: 0.3 % (ref 0–1.9)
BILIRUB SERPL-MCNC: 0.3 MG/DL (ref 0.1–1)
BLD GP AB SCN CELLS X3 SERPL QL: NORMAL
BUN SERPL-MCNC: 16 MG/DL (ref 8–23)
CALCIUM SERPL-MCNC: 10.4 MG/DL (ref 8.7–10.5)
CHLORIDE SERPL-SCNC: 103 MMOL/L (ref 95–110)
CO2 SERPL-SCNC: 23 MMOL/L (ref 23–29)
CREAT SERPL-MCNC: 1.3 MG/DL (ref 0.5–1.4)
DIFFERENTIAL METHOD: ABNORMAL
EOSINOPHIL # BLD AUTO: 0.1 K/UL (ref 0–0.5)
EOSINOPHIL NFR BLD: 1.6 % (ref 0–8)
ERYTHROCYTE [DISTWIDTH] IN BLOOD BY AUTOMATED COUNT: 17.9 % (ref 11.5–14.5)
EST. GFR  (NO RACE VARIABLE): 60 ML/MIN/1.73 M^2
FERRITIN SERPL-MCNC: 911 NG/ML (ref 20–300)
GLUCOSE SERPL-MCNC: 152 MG/DL (ref 70–110)
HCT VFR BLD AUTO: 45 % (ref 40–54)
HGB BLD-MCNC: 14.9 G/DL (ref 14–18)
HLA DQA1 1: NORMAL
HLA DQA1 2: NORMAL
HLA DRB4 1: NORMAL
HLA REALTIMEPCR TYPING CLASSI&II INTERPRETATION: NORMAL
HLA-A 1 SERO. EQUIV: 2
HLA-A 1: NORMAL
HLA-A 2 SERO. EQUIV: 24
HLA-A 2: NORMAL
HLA-B 1 SERO. EQUIV: 7
HLA-B 1: NORMAL
HLA-B 2 SERO. EQUIV: 51
HLA-B 2: NORMAL
HLA-BW 1 SERO. EQUIV: 4
HLA-BW 2 SERO. EQUIV: 6
HLA-C 1: NORMAL
HLA-C 2: NORMAL
HLA-CW 1 SERO. EQUIV: 7
HLA-CW 2 SERO. EQUIV: 15
HLA-DPA1 1: NORMAL
HLA-DPA1 2: NORMAL
HLA-DPB1 1: NORMAL
HLA-DPB1 2: NORMAL
HLA-DQ 1 SERO. EQUIV: 8
HLA-DQ 2 SERO. EQUIV: 6
HLA-DQB1 1: NORMAL
HLA-DQB1 2: NORMAL
HLA-DRB1 1 SERO. EQUIV: 4
HLA-DRB1 1: NORMAL
HLA-DRB1 2 SERO. EQUIV: 15
HLA-DRB1 2: NORMAL
HLA-DRB3 1: NORMAL
HLA-DRB3 2: NORMAL
HLA-DRB345 1 SERO. EQUIV: 53
HLA-DRB345 2 SERO. EQUIV: 51
HLA-DRB4 2: NORMAL
HLA-DRB5 1: NORMAL
HLA-DRB5 2: NORMAL
IMM GRANULOCYTES # BLD AUTO: 0.01 K/UL (ref 0–0.04)
IMM GRANULOCYTES NFR BLD AUTO: 0.3 % (ref 0–0.5)
IRON SERPL-MCNC: 145 UG/DL (ref 45–160)
LDH SERPL L TO P-CCNC: 131 U/L (ref 110–260)
LYMPHOCYTES # BLD AUTO: 1.4 K/UL (ref 1–4.8)
LYMPHOCYTES NFR BLD: 46.5 % (ref 18–48)
MCH RBC QN AUTO: 33.4 PG (ref 27–31)
MCHC RBC AUTO-ENTMCNC: 33.1 G/DL (ref 32–36)
MCV RBC AUTO: 101 FL (ref 82–98)
MONOCYTES # BLD AUTO: 0.2 K/UL (ref 0.3–1)
MONOCYTES NFR BLD: 4.8 % (ref 4–15)
NEUTROPHILS # BLD AUTO: 1.5 K/UL (ref 1.8–7.7)
NEUTROPHILS NFR BLD: 46.8 % (ref 38–73)
NRBC BLD-RTO: 1 /100 WBC
PLATELET # BLD AUTO: 148 K/UL (ref 150–450)
PMV BLD AUTO: 9.7 FL (ref 9.2–12.9)
POTASSIUM SERPL-SCNC: 3.9 MMOL/L (ref 3.5–5.1)
PROT SERPL-MCNC: 7.8 G/DL (ref 6–8.4)
RBC # BLD AUTO: 4.46 M/UL (ref 4.6–6.2)
RETICS/RBC NFR AUTO: 1.4 % (ref 0.4–2)
RTPCR TESTING DATE: NORMAL
SATURATED IRON: 46 % (ref 20–50)
SODIUM SERPL-SCNC: 139 MMOL/L (ref 136–145)
SPECIMEN OUTDATE: NORMAL
TOTAL IRON BINDING CAPACITY: 318 UG/DL (ref 250–450)
TRANSFERRIN SERPL-MCNC: 215 MG/DL (ref 200–375)
URATE SERPL-MCNC: 5.8 MG/DL (ref 3.4–7)
WBC # BLD AUTO: 3.1 K/UL (ref 3.9–12.7)

## 2023-10-30 PROCEDURE — 80053 COMPREHEN METABOLIC PANEL: CPT | Performed by: INTERNAL MEDICINE

## 2023-10-30 PROCEDURE — 82728 ASSAY OF FERRITIN: CPT | Performed by: INTERNAL MEDICINE

## 2023-10-30 PROCEDURE — 86901 BLOOD TYPING SEROLOGIC RH(D): CPT | Performed by: INTERNAL MEDICINE

## 2023-10-30 PROCEDURE — 85045 AUTOMATED RETICULOCYTE COUNT: CPT | Performed by: INTERNAL MEDICINE

## 2023-10-30 PROCEDURE — 84466 ASSAY OF TRANSFERRIN: CPT | Performed by: INTERNAL MEDICINE

## 2023-10-30 PROCEDURE — 84550 ASSAY OF BLOOD/URIC ACID: CPT | Performed by: INTERNAL MEDICINE

## 2023-10-30 PROCEDURE — 83540 ASSAY OF IRON: CPT | Performed by: INTERNAL MEDICINE

## 2023-10-30 PROCEDURE — 85025 COMPLETE CBC W/AUTO DIFF WBC: CPT | Performed by: INTERNAL MEDICINE

## 2023-10-30 PROCEDURE — 83615 LACTATE (LD) (LDH) ENZYME: CPT | Performed by: INTERNAL MEDICINE

## 2023-11-03 ENCOUNTER — PATIENT MESSAGE (OUTPATIENT)
Dept: HEMATOLOGY/ONCOLOGY | Facility: CLINIC | Age: 68
End: 2023-11-03
Payer: MEDICARE

## 2023-11-06 ENCOUNTER — LAB VISIT (OUTPATIENT)
Dept: LAB | Facility: HOSPITAL | Age: 68
End: 2023-11-06
Attending: FAMILY MEDICINE
Payer: MEDICARE

## 2023-11-06 DIAGNOSIS — D64.9 SYMPTOMATIC ANEMIA: ICD-10-CM

## 2023-11-06 DIAGNOSIS — D46.9 MYELODYSPLASTIC SYNDROME: ICD-10-CM

## 2023-11-06 LAB
ALBUMIN SERPL BCP-MCNC: 3.7 G/DL (ref 3.5–5.2)
ALP SERPL-CCNC: 63 U/L (ref 55–135)
ALT SERPL W/O P-5'-P-CCNC: 18 U/L (ref 10–44)
ANION GAP SERPL CALC-SCNC: 11 MMOL/L (ref 8–16)
ANISOCYTOSIS BLD QL SMEAR: SLIGHT
AST SERPL-CCNC: 16 U/L (ref 10–40)
BASOPHILS NFR BLD: 0 % (ref 0–1.9)
BILIRUB SERPL-MCNC: 0.3 MG/DL (ref 0.1–1)
BUN SERPL-MCNC: 16 MG/DL (ref 8–23)
CALCIUM SERPL-MCNC: 9.6 MG/DL (ref 8.7–10.5)
CHLORIDE SERPL-SCNC: 106 MMOL/L (ref 95–110)
CO2 SERPL-SCNC: 25 MMOL/L (ref 23–29)
CREAT SERPL-MCNC: 1.1 MG/DL (ref 0.5–1.4)
DIFFERENTIAL METHOD: ABNORMAL
EOSINOPHIL NFR BLD: 0 % (ref 0–8)
ERYTHROCYTE [DISTWIDTH] IN BLOOD BY AUTOMATED COUNT: 18.5 % (ref 11.5–14.5)
EST. GFR  (NO RACE VARIABLE): >60 ML/MIN/1.73 M^2
FERRITIN SERPL-MCNC: 838 NG/ML (ref 20–300)
GLUCOSE SERPL-MCNC: 119 MG/DL (ref 70–110)
HCT VFR BLD AUTO: 41.7 % (ref 40–54)
HGB BLD-MCNC: 14 G/DL (ref 14–18)
IMM GRANULOCYTES # BLD AUTO: ABNORMAL K/UL (ref 0–0.04)
IMM GRANULOCYTES NFR BLD AUTO: ABNORMAL % (ref 0–0.5)
IRON SERPL-MCNC: 139 UG/DL (ref 45–160)
LDH SERPL L TO P-CCNC: 107 U/L (ref 110–260)
LYMPHOCYTES NFR BLD: 62 % (ref 18–48)
MCH RBC QN AUTO: 33.8 PG (ref 27–31)
MCHC RBC AUTO-ENTMCNC: 33.6 G/DL (ref 32–36)
MCV RBC AUTO: 101 FL (ref 82–98)
MONOCYTES NFR BLD: 5 % (ref 4–15)
NEUTROPHILS NFR BLD: 33 % (ref 38–73)
NRBC BLD-RTO: 0 /100 WBC
PLATELET # BLD AUTO: 84 K/UL (ref 150–450)
PLATELET BLD QL SMEAR: ABNORMAL
PMV BLD AUTO: 9.4 FL (ref 9.2–12.9)
POTASSIUM SERPL-SCNC: 3.9 MMOL/L (ref 3.5–5.1)
PROT SERPL-MCNC: 7.1 G/DL (ref 6–8.4)
RBC # BLD AUTO: 4.14 M/UL (ref 4.6–6.2)
SATURATED IRON: 48 % (ref 20–50)
SODIUM SERPL-SCNC: 142 MMOL/L (ref 136–145)
TOTAL IRON BINDING CAPACITY: 287 UG/DL (ref 250–450)
TRANSFERRIN SERPL-MCNC: 194 MG/DL (ref 200–375)
URATE SERPL-MCNC: 5.8 MG/DL (ref 3.4–7)
WBC # BLD AUTO: 2.06 K/UL (ref 3.9–12.7)

## 2023-11-06 PROCEDURE — 82728 ASSAY OF FERRITIN: CPT | Performed by: INTERNAL MEDICINE

## 2023-11-06 PROCEDURE — 83615 LACTATE (LD) (LDH) ENZYME: CPT | Performed by: INTERNAL MEDICINE

## 2023-11-06 PROCEDURE — 84550 ASSAY OF BLOOD/URIC ACID: CPT | Performed by: INTERNAL MEDICINE

## 2023-11-06 PROCEDURE — 36415 COLL VENOUS BLD VENIPUNCTURE: CPT | Performed by: INTERNAL MEDICINE

## 2023-11-06 PROCEDURE — 85027 COMPLETE CBC AUTOMATED: CPT | Performed by: INTERNAL MEDICINE

## 2023-11-06 PROCEDURE — 84466 ASSAY OF TRANSFERRIN: CPT | Performed by: INTERNAL MEDICINE

## 2023-11-06 PROCEDURE — 80053 COMPREHEN METABOLIC PANEL: CPT | Performed by: INTERNAL MEDICINE

## 2023-11-06 PROCEDURE — 83540 ASSAY OF IRON: CPT | Performed by: INTERNAL MEDICINE

## 2023-11-06 PROCEDURE — 85007 BL SMEAR W/DIFF WBC COUNT: CPT | Performed by: INTERNAL MEDICINE

## 2023-11-07 ENCOUNTER — PATIENT MESSAGE (OUTPATIENT)
Dept: INTERNAL MEDICINE | Facility: CLINIC | Age: 68
End: 2023-11-07
Payer: MEDICARE

## 2023-11-07 ENCOUNTER — PATIENT MESSAGE (OUTPATIENT)
Dept: HEMATOLOGY/ONCOLOGY | Facility: CLINIC | Age: 68
End: 2023-11-07
Payer: MEDICARE

## 2023-12-08 NOTE — PROGRESS NOTES
PATIENT: Guillaume Salinas  MRN: [id number]  DATE: 12/11/2023    Diagnosis:   1. 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 Immunodeficiency secondary to neoplasm    4. Anemia due to antineoplastic chemotherapy    5. Chemotherapy-induced nausea and vomiting    6. Chemotherapy-induced neutropenia      Chief Complaint: myelodysplastic syndrome    Oncologic History:      Oncologic History Myelodysplastic syndrome      Oncologic Treatment Azacitidine/venetoclax (start date 6/12/23).      Pathology 7/3/23:  BONE MARROW ASPIRATE, TOUCH PREP, CLOT, AND DECALCIFIED NEEDLE CORE BIOPSY:   LEFT POSTEROSUPERIOR ILIAC CREST   - MORPHOLOGIC AND IMMUNOPHENOTYPIC FEATURES OF PERSISTENT MDS   - LIMITED, SUBOPTIMAL SPECIMEN: Findings may not be entirely representative   - Hypocellular marrow (20% total cellularity) with no increased CD34+ blasts, chemotherapeutic changes, and scant residual trilineage hypoplasia and dyspoiesis with increased numbers of micromegakaryocytes   - Relative lymphocytosis including small, well-defined lymphoid aggregates (favor benign/reactive)   - Relative polytypic plasmacytosis (5-10%) with morphologically unremarkable plasma cells   - Mildly increased stainable histiocytic iron stores (4+ out of 6+)     5/23/23:  LEFT ILIAC CREST BONE MARROW ASPIRATE, BONE MARROW CLOT, AND BONE MARROW CORE BIOPSY WITH:     CELLULARITY= 90-95%, MYELOID PREDOMINANCE (M/E= 7:1).   CONSISTENT WITH MYELODYSPLASTIC SYNDROME WITH EXCESS BLASTS-2 (16-17%).  SEE COMMENT.   ADEQUATE STORAGE IRON.   INCREASED NUMBER OF MEGAKARYOCYTES WITH DYSPLASTIC MORPHOLOGY.       4/26/23:  Bone marrow, left iliac crest, aspirate, clot, and core biopsy:   --Hypercellular marrow for age, 80-90% with trilineage maturation showing increased blasts and small megakaryocytic forms, most compatible with a primary marrow neoplasm, favor myelodysplasia with excess blasts (MDS-EB-2) with impending evolution, final    classification pending correlation with cytogenetic and molecular studies, see comment   --Stainable iron is not increased   --Mild reticulin fibrosis          Bone marrow, left iliac crest, aspirate, clot, and core biopsy:   --Hypercellular marrow for age, 80-90% with trilineage maturation showing increased blasts and small megakaryocytic forms, most compatible with a primary marrow neoplasm, favor myelodysplasia with excess blasts (MDS-EB-2) with impending evolution, final   classification pending correlation with cytogenetic and molecular studies, see comment   --Stainable iron is not increased   --Mild reticulin fibrosis     Comment:  Concomitantly submitted flow cytometric analysis detects a mildly increased myeloblast population, concerning for a primary marrow process.  The blast gate is increased with approximately 8% CD38/CD34 positive blasts identified.  Populations of    B lymphocytes are polyclonal and T lymphocytes are immunophenotypically unremarkable.     This study is somewhat limited by lack of cellularity on aspirate smears with mild reticulin fibrosis noted.  However, there are increased CD34 positive blasts, counted at 12% on the immunohistochemical stain with increased megakaryocytes showing many micromegakaryocytic forms.  The morphology in conjunction with peripheral cytopenias is compatible with a primary marrow neoplasm, highly suggestive of myelodysplasia with excess blasts (MDS-EB-2) although an evolving acute leukemia is of concern.     Correlation with clinical findings and any available cytogenetic and molecular studies is required for further characterization.              Subjective:    History of Present Illness: Mr. Betsy Salinas is a 68 y.o. male who presented in April 2023 for evaluation and management of anemia.    [I do not see evidence of anemia in his labs, but he was referred for anemia workup.]    - he went to [place] for a dental procedure. He had several teeth  "removed ("an intense procedure per his wife"). He had taken antibiotics/amoxicillin and ibuprofen. He had the second part of procedure about a week later. He noted severe fatigue, weakness.  - he underwent bone marrow biopsy on 4/26/23.  - he met with Dr. Hickey on 5/8/23. He recommended repeat bone marrow biopsy.  - he underwent bone marrow biopsy on 5/23/23.  - he met with Dr. Hickey on 5/30/23. 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 CG/FISH/NGS pending. I reviewed with him and his family the aggressive nature of this disease, including natural history, prognosis, and treatment options. I recommended treatment with azacitidine 75 mg/m2 daily x7 days plus venetoclax 100mg (posa) daily x21 of 28 days. He was in agreement. Consent signed today.   Azacitidine plus venetoclax x21 of 28 days as above. Taz ramp up ordered.  Repeat bone marrow biopsy at the end of cycle 1. Orders placed.     Stem cell transplant candidate: We briefly discussed allogeneic stem cell transplantation in MDS. He will need transplant in CR1. Will plan for further discussion of stem cell transplant and meeting with transplant coordinators at next follow up in 2-3 weeks.   Will send HLA typing with next labs."    - he underwent bone marrow biopsy on 7/3/23    - he met with Dr. Hickey on 6/20/23. Information per note:  "HCT-CI of 1 (intermediate risk). Will plan to proceed with transplant in the next 2-3 months (as soon as a donor is identified). HLA typing sent."    - he saw Dr. Hickey on 7/11/23. Per his note: "will plan to proceed with transplant in the next 2-3 months (as soon as a donor is identified)."    - he underwent bone marrow biopsy on 9/6/23.    Interval history:  - he presents for a follow-up appointment for his myelodysplastic syndrome.  - he saw Dr. Hickey on 10/14/23.  - he is scheduled to start cycle #6 of azacitidine/venetoclax on 12/11/23  - since last visit, he traveled " to [place].  - today, he is doing well. He endorses fatigue, nausea. He denies shortness of breath, chest pain, diarrhea, constipation.        Past medical, surgical, family, and social histories have been reviewed and updated below.    Past Medical History:   Past Medical History:   Diagnosis Date    Anticoagulant long-term use     Coronary artery disease     Hypertension     Peripheral vascular disease, unspecified        Past Surgical History:   Past Surgical History:   Procedure Laterality Date    BONE MARROW BIOPSY Left 4/26/2023    Procedure: Biopsy-bone marrow;  Surgeon: Harry Diamond MD;  Location: [facility] OR;  Service: Oncology;  Laterality: Left;    COLONOSCOPY N/A 01/05/2022    Procedure: COLONOSCOPY Golytely Vaccinated will bring cards;  Surgeon: Dereje Simon MD;  Location: [facility] ENDO;  Service: Endoscopy;  Laterality: N/A;  Do not cancel this order       Family History:   Family History   Problem Relation Age of Onset    Hypertension Mother     Cancer Father     Cancer Brother     No Known Problems Daughter     No Known Problems Daughter     No Known Problems Son        Social History:  reports that he quit smoking about 9 months ago. His smoking use included cigarettes. He started smoking about 50 years ago. He has a 12.5 pack-year smoking history. He has never used smokeless tobacco. He reports that he does not currently use alcohol. He reports that he does not use drugs.    Allergies:  Review of patient's allergies indicates:  No Known Allergies    Medications:  Current Outpatient Medications   Medication Sig Dispense Refill    acyclovir (ZOVIRAX) 400 MG tablet Take 1 tablet (400 mg total) by mouth 2 (two) times daily. 60 tablet 11    aspirin (ECOTRIN) 81 MG EC tablet Take 1 tablet (81 mg total) by mouth once daily. 30 tablet 12    atorvastatin (LIPITOR) 80 MG tablet Take 1 tablet (80 mg total) by mouth once daily. 90 tablet 3    carvediloL (COREG) 6.25 MG tablet TAKE 1 TABLET(6.25 MG) BY  MOUTH TWICE DAILY WITH MEALS 180 tablet 3    cilostazoL (PLETAL) 50 MG Tab Take 1 tablet (50 mg total) by mouth 2 (two) times daily. 180 tablet 3    docusate sodium (COLACE) 100 MG capsule Take 100 mg by mouth 2 (two) times daily as needed for Constipation.      gabapentin (NEURONTIN) 300 MG capsule TAKE 1 CAPSULE(300 MG) BY MOUTH THREE TIMES DAILY 90 capsule 11    levoFLOXacin (LEVAQUIN) 500 MG tablet Take 1 tablet (500 mg total) by mouth once daily. 30 tablet 11    promethazine (PHENERGAN) 25 MG tablet Take 1 tablet (25 mg total) by mouth every 8 (eight) hours as needed for Nausea. 40 tablet 5    venetoclax (VENCLEXTA) 100 mg Tab Take 1 tablet (100 mg) by mouth once daily on days 1-7 of a 28-day cycle. Do not discard any remaining tablets. 28 tablet 11    voriconazole (VFEND) 200 MG Tab Take 1 tablet (200 mg total) by mouth 2 (two) times daily. 60 tablet 11    zolpidem (AMBIEN) 10 mg Tab Take 10 mg by mouth nightly as needed.       No current facility-administered medications for this visit.       Review of Systems   Constitutional:  Positive for fatigue.   HENT:  Negative for sore throat.    Eyes:  Negative for visual disturbance.   Respiratory:  Negative for shortness of breath.    Cardiovascular:  Negative for chest pain.   Gastrointestinal:  Positive for nausea. Negative for abdominal pain.   Genitourinary:  Negative for dysuria.   Musculoskeletal:  Negative for back pain.   Skin:  Negative for rash.   Neurological:  Negative for weakness and headaches.   Hematological:  Negative for adenopathy.   Psychiatric/Behavioral:  The patient is not nervous/anxious.        ECOG Performance Status:   ECOG SCORE    1 - Restricted in strenuous activity-ambulatory and able to carry out work of a light nature         Objective:      Vitals:   Vitals:    12/11/23 1123   BP: 107/67   BP Location: Right arm   Patient Position: Sitting   BP Method: Medium (Automatic)   Pulse: 72   Resp: 18   SpO2: 98%   Weight: 77.8 kg (171 lb  "8.3 oz)     BMI: Body mass index is 25.33 kg/m².    Physical Exam  Vitals and nursing note reviewed.   Constitutional:       Appearance: He is well-developed.   HENT:      Head: Normocephalic and atraumatic.   Eyes:      Pupils: Pupils are equal, round, and reactive to light.   Cardiovascular:      Rate and Rhythm: Normal rate and regular rhythm.   Pulmonary:      Effort: Pulmonary effort is normal.      Breath sounds: Normal breath sounds.   Abdominal:      General: Bowel sounds are normal.      Palpations: Abdomen is soft.   Musculoskeletal:         General: Normal range of motion.      Cervical back: Normal range of motion and neck supple.   Skin:     General: Skin is warm and dry.   Neurological:      Mental Status: He is alert and oriented to person, place, and time.   Psychiatric:         Behavior: Behavior normal.         Thought Content: Thought content normal.         Judgment: Judgment normal.         Laboratory Data:  Labs have been reviewed.    Lab Results   Component Value Date    WBC 2.06 (L) 11/06/2023    HGB 14.0 11/06/2023    HCT 41.7 11/06/2023     (H) 11/06/2023    PLT 84 (L) 11/06/2023       Imaging:        Assessment:       1. 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 Immunodeficiency secondary to neoplasm    4. Anemia due to antineoplastic chemotherapy    5. Chemotherapy-induced nausea and vomiting    6. Chemotherapy-induced neutropenia           Plan:     Myelodysplastic syndrome / neutropenia due to chemo.  - bone marrow biopsy (4/26/23) revealed myelodysplastic syndrome.  - he met with Dr. Hickey on 5/8/23. He recommended repeat bone marrow biopsy.  - bone marrow biopsy (5/23/23) revealed myelodysplastic syndrome with excess blasts.  - he met with Dr. Hickey on 5/30/23. He recommended azacitidine (7-day) with venetoclax.  - he has received several blood transfusions in June 2023  - he met with Dr. Hickey on 6/20/23. Information per note:  "HCT-CI of " "1 (intermediate risk). Will plan to proceed with transplant in the next 2-3 months (as soon as a donor is identified). HLA typing sent."  - bone marrow biopsy (7/3/23) revealed no increased blasts! He will follow up with Dr. Hickey on 7/11/23  - he has received blood/platelets on 7/5/23.  - he has not received blood/platelet transfusion in several weeks.  - he saw Dr. Hickey on 8/15/23. Per his note:  " Will plan to proceed with transplant ASAP (as soon as a donor is identified). HLA typing sent."  - he underwent bone marrow biopsy on 9/6/23.   - change vidaza to 5 days, and venetoclax from 14 days to 7 days every 28 days (due to cytopenias).  - plan is still for transplant once a donor is found.  - Labs have been reviewed. Absolute neutrophil count is 1.7 k/uL. Hemoglobin is 14.1 g/dL. Platelet count is 84 k/uL.  - okay to proceed with cycle #6 of azacitidine/venetoclax, starting on on 12/11/23  - continue anti-infectious therapy per Dr. Hickey's plan.  - return to clinic in one month.    2. Chemotherapy-induced nausea/vomiting  - mild symptoms. Continue phenergan as needed.    3. Advance Care Planning     Power of   After our discussion (at previous visit), the patient has decided not to complete a HCPOA.       - return to clinic in one month.    Harry Diamond M.D.  Hematology/Oncology  [facility] - [address], [address]  [place] [address]  Phone: [phone]  Fax: [phone]  "

## 2023-12-11 ENCOUNTER — OFFICE VISIT (OUTPATIENT)
Dept: HEMATOLOGY/ONCOLOGY | Facility: CLINIC | Age: 68
End: 2023-12-11
Payer: MEDICARE

## 2023-12-11 ENCOUNTER — INFUSION (OUTPATIENT)
Dept: INFUSION THERAPY | Facility: HOSPITAL | Age: 68
End: 2023-12-11
Attending: INTERNAL MEDICINE
Payer: MEDICARE

## 2023-12-11 VITALS
SYSTOLIC BLOOD PRESSURE: 107 MMHG | HEIGHT: 69 IN | RESPIRATION RATE: 18 BRPM | DIASTOLIC BLOOD PRESSURE: 67 MMHG | BODY MASS INDEX: 25.4 KG/M2 | HEART RATE: 72 BPM | OXYGEN SATURATION: 98 % | WEIGHT: 171.5 LBS | TEMPERATURE: 98 F

## 2023-12-11 VITALS
OXYGEN SATURATION: 98 % | RESPIRATION RATE: 18 BRPM | WEIGHT: 171.5 LBS | DIASTOLIC BLOOD PRESSURE: 67 MMHG | BODY MASS INDEX: 25.33 KG/M2 | SYSTOLIC BLOOD PRESSURE: 107 MMHG | HEART RATE: 72 BPM

## 2023-12-11 DIAGNOSIS — D84.821 IMMUNODEFICIENCY DUE TO DRUG THERAPY: ICD-10-CM

## 2023-12-11 DIAGNOSIS — R11.2 CHEMOTHERAPY-INDUCED NAUSEA AND VOMITING: ICD-10-CM

## 2023-12-11 DIAGNOSIS — D84.81 IMMUNODEFICIENCY SECONDARY TO NEOPLASM: ICD-10-CM

## 2023-12-11 DIAGNOSIS — D49.9 IMMUNODEFICIENCY SECONDARY TO NEOPLASM: ICD-10-CM

## 2023-12-11 DIAGNOSIS — D46.9 MYELODYSPLASTIC SYNDROME: Primary | ICD-10-CM

## 2023-12-11 DIAGNOSIS — D64.81 ANEMIA DUE TO ANTINEOPLASTIC CHEMOTHERAPY: ICD-10-CM

## 2023-12-11 DIAGNOSIS — D70.1 CHEMOTHERAPY-INDUCED NEUTROPENIA: ICD-10-CM

## 2023-12-11 DIAGNOSIS — Z79.899 IMMUNODEFICIENCY DUE TO DRUG THERAPY: ICD-10-CM

## 2023-12-11 DIAGNOSIS — T45.1X5A ANEMIA DUE TO ANTINEOPLASTIC CHEMOTHERAPY: ICD-10-CM

## 2023-12-11 DIAGNOSIS — T45.1X5A CHEMOTHERAPY-INDUCED NEUTROPENIA: ICD-10-CM

## 2023-12-11 DIAGNOSIS — T45.1X5A CHEMOTHERAPY-INDUCED NAUSEA AND VOMITING: ICD-10-CM

## 2023-12-11 PROCEDURE — 99215 OFFICE O/P EST HI 40 MIN: CPT | Mod: S$GLB,,, | Performed by: INTERNAL MEDICINE

## 2023-12-11 PROCEDURE — 99999 PR PBB SHADOW E&M-EST. PATIENT-LVL III: CPT | Mod: PBBFAC,,, | Performed by: INTERNAL MEDICINE

## 2023-12-11 PROCEDURE — 99999 PR PBB SHADOW E&M-EST. PATIENT-LVL III: ICD-10-PCS | Mod: PBBFAC,,, | Performed by: INTERNAL MEDICINE

## 2023-12-11 PROCEDURE — 3288F PR FALLS RISK ASSESSMENT DOCUMENTED: ICD-10-PCS | Mod: CPTII,S$GLB,, | Performed by: INTERNAL MEDICINE

## 2023-12-11 PROCEDURE — 3008F BODY MASS INDEX DOCD: CPT | Mod: CPTII,S$GLB,, | Performed by: INTERNAL MEDICINE

## 2023-12-11 PROCEDURE — 1159F MED LIST DOCD IN RCRD: CPT | Mod: CPTII,S$GLB,, | Performed by: INTERNAL MEDICINE

## 2023-12-11 PROCEDURE — 99215 PR OFFICE/OUTPT VISIT, EST, LEVL V, 40-54 MIN: ICD-10-PCS | Mod: S$GLB,,, | Performed by: INTERNAL MEDICINE

## 2023-12-11 PROCEDURE — 1101F PT FALLS ASSESS-DOCD LE1/YR: CPT | Mod: CPTII,S$GLB,, | Performed by: INTERNAL MEDICINE

## 2023-12-11 PROCEDURE — 3074F PR MOST RECENT SYSTOLIC BLOOD PRESSURE < 130 MM HG: ICD-10-PCS | Mod: CPTII,S$GLB,, | Performed by: INTERNAL MEDICINE

## 2023-12-11 PROCEDURE — 3008F PR BODY MASS INDEX (BMI) DOCUMENTED: ICD-10-PCS | Mod: CPTII,S$GLB,, | Performed by: INTERNAL MEDICINE

## 2023-12-11 PROCEDURE — 3078F DIAST BP <80 MM HG: CPT | Mod: CPTII,S$GLB,, | Performed by: INTERNAL MEDICINE

## 2023-12-11 PROCEDURE — 1101F PR PT FALLS ASSESS DOC 0-1 FALLS W/OUT INJ PAST YR: ICD-10-PCS | Mod: CPTII,S$GLB,, | Performed by: INTERNAL MEDICINE

## 2023-12-11 PROCEDURE — 3288F FALL RISK ASSESSMENT DOCD: CPT | Mod: CPTII,S$GLB,, | Performed by: INTERNAL MEDICINE

## 2023-12-11 PROCEDURE — 1126F AMNT PAIN NOTED NONE PRSNT: CPT | Mod: CPTII,S$GLB,, | Performed by: INTERNAL MEDICINE

## 2023-12-11 PROCEDURE — 25000003 PHARM REV CODE 250: Performed by: INTERNAL MEDICINE

## 2023-12-11 PROCEDURE — 63600175 PHARM REV CODE 636 W HCPCS: Performed by: INTERNAL MEDICINE

## 2023-12-11 PROCEDURE — 1160F PR REVIEW ALL MEDS BY PRESCRIBER/CLIN PHARMACIST DOCUMENTED: ICD-10-PCS | Mod: CPTII,S$GLB,, | Performed by: INTERNAL MEDICINE

## 2023-12-11 PROCEDURE — 1160F RVW MEDS BY RX/DR IN RCRD: CPT | Mod: CPTII,S$GLB,, | Performed by: INTERNAL MEDICINE

## 2023-12-11 PROCEDURE — 1126F PR PAIN SEVERITY QUANTIFIED, NO PAIN PRESENT: ICD-10-PCS | Mod: CPTII,S$GLB,, | Performed by: INTERNAL MEDICINE

## 2023-12-11 PROCEDURE — 3078F PR MOST RECENT DIASTOLIC BLOOD PRESSURE < 80 MM HG: ICD-10-PCS | Mod: CPTII,S$GLB,, | Performed by: INTERNAL MEDICINE

## 2023-12-11 PROCEDURE — 1159F PR MEDICATION LIST DOCUMENTED IN MEDICAL RECORD: ICD-10-PCS | Mod: CPTII,S$GLB,, | Performed by: INTERNAL MEDICINE

## 2023-12-11 PROCEDURE — 96401 CHEMO ANTI-NEOPL SQ/IM: CPT

## 2023-12-11 PROCEDURE — 3074F SYST BP LT 130 MM HG: CPT | Mod: CPTII,S$GLB,, | Performed by: INTERNAL MEDICINE

## 2023-12-11 RX ORDER — VORICONAZOLE 200 MG/1
200 TABLET, FILM COATED ORAL 2 TIMES DAILY
Qty: 60 TABLET | Refills: 11 | Status: SHIPPED | OUTPATIENT
Start: 2023-12-11 | End: 2024-03-18 | Stop reason: SDUPTHER

## 2023-12-11 RX ORDER — ONDANSETRON HYDROCHLORIDE 8 MG/1
16 TABLET, FILM COATED ORAL
Status: CANCELLED | OUTPATIENT
Start: 2023-12-12

## 2023-12-11 RX ORDER — AZACITIDINE 100 MG/1
75 INJECTION, POWDER, LYOPHILIZED, FOR SOLUTION INTRAVENOUS; SUBCUTANEOUS
Status: CANCELLED | OUTPATIENT
Start: 2023-12-11

## 2023-12-11 RX ORDER — ONDANSETRON HYDROCHLORIDE 8 MG/1
16 TABLET, FILM COATED ORAL
Status: COMPLETED | OUTPATIENT
Start: 2023-12-11 | End: 2023-12-11

## 2023-12-11 RX ORDER — ONDANSETRON HYDROCHLORIDE 8 MG/1
16 TABLET, FILM COATED ORAL
Status: CANCELLED | OUTPATIENT
Start: 2023-12-15

## 2023-12-11 RX ORDER — AZACITIDINE 100 MG/1
75 INJECTION, POWDER, LYOPHILIZED, FOR SOLUTION INTRAVENOUS; SUBCUTANEOUS
Status: CANCELLED | OUTPATIENT
Start: 2023-12-13

## 2023-12-11 RX ORDER — ONDANSETRON HYDROCHLORIDE 8 MG/1
16 TABLET, FILM COATED ORAL
Status: CANCELLED | OUTPATIENT
Start: 2023-12-13

## 2023-12-11 RX ORDER — AZACITIDINE 100 MG/1
75 INJECTION, POWDER, LYOPHILIZED, FOR SOLUTION INTRAVENOUS; SUBCUTANEOUS
Status: CANCELLED | OUTPATIENT
Start: 2023-12-14

## 2023-12-11 RX ORDER — ONDANSETRON HYDROCHLORIDE 8 MG/1
16 TABLET, FILM COATED ORAL
Status: CANCELLED | OUTPATIENT
Start: 2023-12-14

## 2023-12-11 RX ORDER — ONDANSETRON HYDROCHLORIDE 8 MG/1
16 TABLET, FILM COATED ORAL
Status: CANCELLED | OUTPATIENT
Start: 2023-12-11

## 2023-12-11 RX ORDER — AZACITIDINE 100 MG/1
75 INJECTION, POWDER, LYOPHILIZED, FOR SOLUTION INTRAVENOUS; SUBCUTANEOUS
Status: CANCELLED | OUTPATIENT
Start: 2023-12-12

## 2023-12-11 RX ORDER — AZACITIDINE 100 MG/1
75 INJECTION, POWDER, LYOPHILIZED, FOR SOLUTION INTRAVENOUS; SUBCUTANEOUS
Status: CANCELLED | OUTPATIENT
Start: 2023-12-15

## 2023-12-11 RX ORDER — AZACITIDINE 100 MG/1
75 INJECTION, POWDER, LYOPHILIZED, FOR SOLUTION INTRAVENOUS; SUBCUTANEOUS
Status: COMPLETED | OUTPATIENT
Start: 2023-12-11 | End: 2023-12-11

## 2023-12-11 RX ADMIN — ONDANSETRON HYDROCHLORIDE 16 MG: 8 TABLET, FILM COATED ORAL at 12:12

## 2023-12-11 RX ADMIN — AZACITIDINE 140 MG: 100 INJECTION, POWDER, LYOPHILIZED, FOR SOLUTION INTRAVENOUS; SUBCUTANEOUS at 01:12

## 2023-12-11 NOTE — Clinical Note
Good morning,  Patient states he needs a follow-up appointment with Dr. Hickey. He had been out of town during the past month. Can your team schedule a follow-up appointment with him in the next few weeks?  Thanks!

## 2023-12-11 NOTE — PLAN OF CARE
Patient tolerated cycle 6 day 1 Vidaza injection well, VSS, no complaints at this time. Treatment plan and schedule reviewed, pt verbalized understanding. Pt d/c in no acute distress.

## 2023-12-12 ENCOUNTER — INFUSION (OUTPATIENT)
Dept: INFUSION THERAPY | Facility: HOSPITAL | Age: 68
End: 2023-12-12
Attending: INTERNAL MEDICINE
Payer: MEDICARE

## 2023-12-12 VITALS
RESPIRATION RATE: 18 BRPM | DIASTOLIC BLOOD PRESSURE: 57 MMHG | HEART RATE: 72 BPM | HEIGHT: 69 IN | OXYGEN SATURATION: 97 % | TEMPERATURE: 98 F | BODY MASS INDEX: 25.4 KG/M2 | SYSTOLIC BLOOD PRESSURE: 94 MMHG | WEIGHT: 171.5 LBS

## 2023-12-12 DIAGNOSIS — D46.9 MYELODYSPLASTIC SYNDROME: Primary | ICD-10-CM

## 2023-12-12 PROCEDURE — 25000003 PHARM REV CODE 250: Performed by: INTERNAL MEDICINE

## 2023-12-12 PROCEDURE — 96401 CHEMO ANTI-NEOPL SQ/IM: CPT

## 2023-12-12 PROCEDURE — 63600175 PHARM REV CODE 636 W HCPCS: Performed by: INTERNAL MEDICINE

## 2023-12-12 RX ORDER — AZACITIDINE 100 MG/1
75 INJECTION, POWDER, LYOPHILIZED, FOR SOLUTION INTRAVENOUS; SUBCUTANEOUS
Status: COMPLETED | OUTPATIENT
Start: 2023-12-12 | End: 2023-12-12

## 2023-12-12 RX ORDER — ONDANSETRON HYDROCHLORIDE 8 MG/1
16 TABLET, FILM COATED ORAL
Status: COMPLETED | OUTPATIENT
Start: 2023-12-12 | End: 2023-12-12

## 2023-12-12 RX ADMIN — ONDANSETRON HYDROCHLORIDE 16 MG: 8 TABLET, FILM COATED ORAL at 01:12

## 2023-12-12 RX ADMIN — AZACITIDINE 140 MG: 100 INJECTION, POWDER, LYOPHILIZED, FOR SOLUTION INTRAVENOUS; SUBCUTANEOUS at 01:12

## 2023-12-13 ENCOUNTER — INFUSION (OUTPATIENT)
Dept: INFUSION THERAPY | Facility: HOSPITAL | Age: 68
End: 2023-12-13
Attending: INTERNAL MEDICINE
Payer: MEDICARE

## 2023-12-13 VITALS
DIASTOLIC BLOOD PRESSURE: 73 MMHG | RESPIRATION RATE: 18 BRPM | OXYGEN SATURATION: 95 % | HEART RATE: 85 BPM | BODY MASS INDEX: 25.4 KG/M2 | SYSTOLIC BLOOD PRESSURE: 115 MMHG | WEIGHT: 171.5 LBS | HEIGHT: 69 IN | TEMPERATURE: 99 F

## 2023-12-13 DIAGNOSIS — D46.9 MYELODYSPLASTIC SYNDROME: Primary | ICD-10-CM

## 2023-12-13 PROCEDURE — 96401 CHEMO ANTI-NEOPL SQ/IM: CPT

## 2023-12-13 PROCEDURE — 63600175 PHARM REV CODE 636 W HCPCS: Performed by: INTERNAL MEDICINE

## 2023-12-13 PROCEDURE — 25000003 PHARM REV CODE 250: Performed by: INTERNAL MEDICINE

## 2023-12-13 RX ORDER — AZACITIDINE 100 MG/1
75 INJECTION, POWDER, LYOPHILIZED, FOR SOLUTION INTRAVENOUS; SUBCUTANEOUS
Status: COMPLETED | OUTPATIENT
Start: 2023-12-13 | End: 2023-12-13

## 2023-12-13 RX ORDER — ONDANSETRON HYDROCHLORIDE 8 MG/1
16 TABLET, FILM COATED ORAL
Status: COMPLETED | OUTPATIENT
Start: 2023-12-13 | End: 2023-12-13

## 2023-12-13 RX ADMIN — AZACITIDINE 140 MG: 100 INJECTION, POWDER, LYOPHILIZED, FOR SOLUTION INTRAVENOUS; SUBCUTANEOUS at 01:12

## 2023-12-13 RX ADMIN — ONDANSETRON HYDROCHLORIDE 16 MG: 8 TABLET, FILM COATED ORAL at 12:12

## 2023-12-14 ENCOUNTER — INFUSION (OUTPATIENT)
Dept: INFUSION THERAPY | Facility: HOSPITAL | Age: 68
End: 2023-12-14
Attending: INTERNAL MEDICINE
Payer: MEDICARE

## 2023-12-14 VITALS
BODY MASS INDEX: 25.4 KG/M2 | TEMPERATURE: 98 F | HEIGHT: 69 IN | OXYGEN SATURATION: 96 % | RESPIRATION RATE: 18 BRPM | SYSTOLIC BLOOD PRESSURE: 130 MMHG | DIASTOLIC BLOOD PRESSURE: 60 MMHG | WEIGHT: 171.5 LBS | HEART RATE: 75 BPM

## 2023-12-14 DIAGNOSIS — D46.9 MYELODYSPLASTIC SYNDROME: Primary | ICD-10-CM

## 2023-12-14 PROCEDURE — 63600175 PHARM REV CODE 636 W HCPCS: Performed by: INTERNAL MEDICINE

## 2023-12-14 PROCEDURE — 25000003 PHARM REV CODE 250: Performed by: INTERNAL MEDICINE

## 2023-12-14 PROCEDURE — 96401 CHEMO ANTI-NEOPL SQ/IM: CPT

## 2023-12-14 RX ORDER — AZACITIDINE 100 MG/1
75 INJECTION, POWDER, LYOPHILIZED, FOR SOLUTION INTRAVENOUS; SUBCUTANEOUS
Status: COMPLETED | OUTPATIENT
Start: 2023-12-14 | End: 2023-12-14

## 2023-12-14 RX ORDER — ONDANSETRON HYDROCHLORIDE 8 MG/1
16 TABLET, FILM COATED ORAL
Status: COMPLETED | OUTPATIENT
Start: 2023-12-14 | End: 2023-12-14

## 2023-12-14 RX ADMIN — ONDANSETRON HYDROCHLORIDE 16 MG: 8 TABLET, FILM COATED ORAL at 11:12

## 2023-12-14 RX ADMIN — AZACITIDINE 140 MG: 100 INJECTION, POWDER, LYOPHILIZED, FOR SOLUTION INTRAVENOUS; SUBCUTANEOUS at 12:12

## 2023-12-14 NOTE — PLAN OF CARE
Patient tolerated Cycle 6 Day 4 Vidaza injection well, no complaints at this time. Pt d/c in no acute distress.

## 2023-12-15 ENCOUNTER — INFUSION (OUTPATIENT)
Dept: INFUSION THERAPY | Facility: HOSPITAL | Age: 68
End: 2023-12-15
Attending: INTERNAL MEDICINE
Payer: MEDICARE

## 2023-12-15 VITALS
WEIGHT: 171.5 LBS | DIASTOLIC BLOOD PRESSURE: 74 MMHG | BODY MASS INDEX: 25.4 KG/M2 | TEMPERATURE: 98 F | RESPIRATION RATE: 20 BRPM | HEART RATE: 82 BPM | OXYGEN SATURATION: 95 % | HEIGHT: 69 IN | SYSTOLIC BLOOD PRESSURE: 140 MMHG

## 2023-12-15 DIAGNOSIS — D46.9 MYELODYSPLASTIC SYNDROME: Primary | ICD-10-CM

## 2023-12-15 PROCEDURE — 25000003 PHARM REV CODE 250: Performed by: INTERNAL MEDICINE

## 2023-12-15 PROCEDURE — 63600175 PHARM REV CODE 636 W HCPCS: Performed by: INTERNAL MEDICINE

## 2023-12-15 PROCEDURE — 96401 CHEMO ANTI-NEOPL SQ/IM: CPT

## 2023-12-15 RX ORDER — ONDANSETRON HYDROCHLORIDE 8 MG/1
16 TABLET, FILM COATED ORAL
Status: COMPLETED | OUTPATIENT
Start: 2023-12-15 | End: 2023-12-15

## 2023-12-15 RX ORDER — AZACITIDINE 100 MG/1
75 INJECTION, POWDER, LYOPHILIZED, FOR SOLUTION INTRAVENOUS; SUBCUTANEOUS
Status: COMPLETED | OUTPATIENT
Start: 2023-12-15 | End: 2023-12-15

## 2023-12-15 RX ADMIN — AZACITIDINE 140 MG: 100 INJECTION, POWDER, LYOPHILIZED, FOR SOLUTION INTRAVENOUS; SUBCUTANEOUS at 01:12

## 2023-12-15 RX ADMIN — ONDANSETRON HYDROCHLORIDE 16 MG: 8 TABLET, FILM COATED ORAL at 12:12

## 2023-12-19 DIAGNOSIS — D84.81 IMMUNODEFICIENCY SECONDARY TO NEOPLASM: ICD-10-CM

## 2023-12-19 DIAGNOSIS — D49.9 IMMUNODEFICIENCY SECONDARY TO NEOPLASM: ICD-10-CM

## 2023-12-20 RX ORDER — ACYCLOVIR 400 MG/1
400 TABLET ORAL 2 TIMES DAILY
Qty: 60 TABLET | Refills: 11 | Status: SHIPPED | OUTPATIENT
Start: 2023-12-20 | End: 2024-03-18 | Stop reason: SDUPTHER

## 2024-01-05 PROBLEM — N18.31 CHRONIC KIDNEY DISEASE, STAGE 3A: Status: ACTIVE | Noted: 2024-01-05

## 2024-01-05 NOTE — PROGRESS NOTES
PATIENT: Guillaume Salinas  MRN: [id number]  DATE: 1/8/2024    Diagnosis:   1. Myelodysplastic syndrome    2. Pancytopenia    3. Chemotherapy-induced neutropenia    4. 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 Anemia due to antineoplastic chemotherapy    7. Chemotherapy-induced nausea and vomiting      Chief Complaint: myelodysplastic syndrome    Oncologic History:      Oncologic History Myelodysplastic syndrome      Oncologic Treatment Azacitidine/venetoclax (start date 6/12/23).      Pathology 7/3/23:  BONE MARROW ASPIRATE, TOUCH PREP, CLOT, AND DECALCIFIED NEEDLE CORE BIOPSY:   LEFT POSTEROSUPERIOR ILIAC CREST   - MORPHOLOGIC AND IMMUNOPHENOTYPIC FEATURES OF PERSISTENT MDS   - LIMITED, SUBOPTIMAL SPECIMEN: Findings may not be entirely representative   - Hypocellular marrow (20% total cellularity) with no increased CD34+ blasts, chemotherapeutic changes, and scant residual trilineage hypoplasia and dyspoiesis with increased numbers of micromegakaryocytes   - Relative lymphocytosis including small, well-defined lymphoid aggregates (favor benign/reactive)   - Relative polytypic plasmacytosis (5-10%) with morphologically unremarkable plasma cells   - Mildly increased stainable histiocytic iron stores (4+ out of 6+)     5/23/23:  LEFT ILIAC CREST BONE MARROW ASPIRATE, BONE MARROW CLOT, AND BONE MARROW CORE BIOPSY WITH:     CELLULARITY= 90-95%, MYELOID PREDOMINANCE (M/E= 7:1).   CONSISTENT WITH MYELODYSPLASTIC SYNDROME WITH EXCESS BLASTS-2 (16-17%).  SEE COMMENT.   ADEQUATE STORAGE IRON.   INCREASED NUMBER OF MEGAKARYOCYTES WITH DYSPLASTIC MORPHOLOGY.       4/26/23:  Bone marrow, left iliac crest, aspirate, clot, and core biopsy:   --Hypercellular marrow for age, 80-90% with trilineage maturation showing increased blasts and small megakaryocytic forms, most compatible with a primary marrow neoplasm, favor myelodysplasia with excess blasts (MDS-EB-2) with impending  evolution, final   classification pending correlation with cytogenetic and molecular studies, see comment   --Stainable iron is not increased   --Mild reticulin fibrosis          Bone marrow, left iliac crest, aspirate, clot, and core biopsy:   --Hypercellular marrow for age, 80-90% with trilineage maturation showing increased blasts and small megakaryocytic forms, most compatible with a primary marrow neoplasm, favor myelodysplasia with excess blasts (MDS-EB-2) with impending evolution, final   classification pending correlation with cytogenetic and molecular studies, see comment   --Stainable iron is not increased   --Mild reticulin fibrosis     Comment:  Concomitantly submitted flow cytometric analysis detects a mildly increased myeloblast population, concerning for a primary marrow process.  The blast gate is increased with approximately 8% CD38/CD34 positive blasts identified.  Populations of    B lymphocytes are polyclonal and T lymphocytes are immunophenotypically unremarkable.     This study is somewhat limited by lack of cellularity on aspirate smears with mild reticulin fibrosis noted.  However, there are increased CD34 positive blasts, counted at 12% on the immunohistochemical stain with increased megakaryocytes showing many micromegakaryocytic forms.  The morphology in conjunction with peripheral cytopenias is compatible with a primary marrow neoplasm, highly suggestive of myelodysplasia with excess blasts (MDS-EB-2) although an evolving acute leukemia is of concern.     Correlation with clinical findings and any available cytogenetic and molecular studies is required for further characterization.              Subjective:    History of Present Illness: Mr. Betsy Salinas is a 68 y.o. male who presented in April 2023 for evaluation and management of anemia.    [I do not see evidence of anemia in his labs, but he was referred for anemia workup.]    - he went to [place] for a dental procedure. He had  "several teeth removed ("an intense procedure per his wife"). He had taken antibiotics/amoxicillin and ibuprofen. He had the second part of procedure about a week later. He noted severe fatigue, weakness.  - he underwent bone marrow biopsy on 4/26/23.  - he met with Dr. Hickey on 5/8/23. He recommended repeat bone marrow biopsy.  - he underwent bone marrow biopsy on 5/23/23.  - he met with Dr. Hickey on 5/30/23. 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 CG/FISH/NGS pending. I reviewed with him and his family the aggressive nature of this disease, including natural history, prognosis, and treatment options. I recommended treatment with azacitidine 75 mg/m2 daily x7 days plus venetoclax 100mg (posa) daily x21 of 28 days. He was in agreement. Consent signed today.   Azacitidine plus venetoclax x21 of 28 days as above. Taz ramp up ordered.  Repeat bone marrow biopsy at the end of cycle 1. Orders placed.     Stem cell transplant candidate: We briefly discussed allogeneic stem cell transplantation in MDS. He will need transplant in CR1. Will plan for further discussion of stem cell transplant and meeting with transplant coordinators at next follow up in 2-3 weeks.   Will send HLA typing with next labs."    - he underwent bone marrow biopsy on 7/3/23    - he met with Dr. Hickey on 6/20/23. Information per note:  "HCT-CI of 1 (intermediate risk). Will plan to proceed with transplant in the next 2-3 months (as soon as a donor is identified). HLA typing sent."    - he saw Dr. Hickey on 7/11/23. Per his note: "will plan to proceed with transplant in the next 2-3 months (as soon as a donor is identified)."    - he underwent bone marrow biopsy on 9/6/23.  - he saw Dr. Hickey on 10/14/23.    Interval history:  - he presents for a follow-up appointment for his myelodysplastic syndrome.  - he saw Dr. Hickey on 10/14/23.  - he is scheduled to start cycle #7 of " azacitidine/venetoclax on 1/8/24  - today, he endorses mild fatigue. He denies shortness of breath, chest pain, diarrhea, constipation.        Past medical, surgical, family, and social histories have been reviewed and updated below.    Past Medical History:   Past Medical History:   Diagnosis Date    Anticoagulant long-term use     Coronary artery disease     Hypertension     Peripheral vascular disease, unspecified        Past Surgical History:   Past Surgical History:   Procedure Laterality Date    BONE MARROW BIOPSY Left 4/26/2023    Procedure: Biopsy-bone marrow;  Surgeon: Harry Diamond MD;  Location: [facility] OR;  Service: Oncology;  Laterality: Left;    COLONOSCOPY N/A 01/05/2022    Procedure: COLONOSCOPY Golytely Vaccinated will bring cards;  Surgeon: Dereje Simon MD;  Location: [facility] ENDO;  Service: Endoscopy;  Laterality: N/A;  Do not cancel this order       Family History:   Family History   Problem Relation Age of Onset    Hypertension Mother     Cancer Father     Cancer Brother     No Known Problems Daughter     No Known Problems Daughter     No Known Problems Son        Social History:  reports that he quit smoking about 10 months ago. His smoking use included cigarettes. He started smoking about 50 years ago. He has a 12.5 pack-year smoking history. He has never used smokeless tobacco. He reports that he does not currently use alcohol. He reports that he does not use drugs.    Allergies:  Review of patient's allergies indicates:  No Known Allergies    Medications:  Current Outpatient Medications   Medication Sig Dispense Refill    acyclovir (ZOVIRAX) 400 MG tablet Take 1 tablet (400 mg total) by mouth 2 (two) times daily. 60 tablet 11    aspirin (ECOTRIN) 81 MG EC tablet Take 1 tablet (81 mg total) by mouth once daily. 30 tablet 12    atorvastatin (LIPITOR) 80 MG tablet Take 1 tablet (80 mg total) by mouth once daily. 90 tablet 3    carvediloL (COREG) 6.25 MG tablet TAKE 1 TABLET(6.25 MG) BY  MOUTH TWICE DAILY WITH MEALS 180 tablet 3    cilostazoL (PLETAL) 50 MG Tab Take 1 tablet (50 mg total) by mouth 2 (two) times daily. 180 tablet 3    docusate sodium (COLACE) 100 MG capsule Take 100 mg by mouth 2 (two) times daily as needed for Constipation.      gabapentin (NEURONTIN) 300 MG capsule TAKE 1 CAPSULE(300 MG) BY MOUTH THREE TIMES DAILY 90 capsule 11    levoFLOXacin (LEVAQUIN) 500 MG tablet Take 1 tablet (500 mg total) by mouth once daily. 30 tablet 11    promethazine (PHENERGAN) 25 MG tablet Take 1 tablet (25 mg total) by mouth every 8 (eight) hours as needed for Nausea. 40 tablet 5    venetoclax (VENCLEXTA) 100 mg Tab Take 1 tablet (100 mg) by mouth once daily on days 1-7 of a 28-day cycle. Do not discard any remaining tablets. 28 tablet 11    voriconazole (VFEND) 200 MG Tab Take 1 tablet (200 mg total) by mouth 2 (two) times daily. 60 tablet 11    zolpidem (AMBIEN) 10 mg Tab Take 10 mg by mouth nightly as needed.       No current facility-administered medications for this visit.       Review of Systems   Constitutional:  Positive for fatigue.   HENT:  Negative for sore throat.    Eyes:  Negative for visual disturbance.   Respiratory:  Negative for shortness of breath.    Cardiovascular:  Negative for chest pain.   Gastrointestinal:  Positive for nausea. Negative for abdominal pain.   Genitourinary:  Negative for dysuria.   Musculoskeletal:  Negative for back pain.   Skin:  Negative for rash.   Neurological:  Negative for weakness and headaches.   Hematological:  Negative for adenopathy.   Psychiatric/Behavioral:  The patient is not nervous/anxious.        ECOG Performance Status:   ECOG SCORE    1 - Restricted in strenuous activity-ambulatory and able to carry out work of a light nature         Objective:      Vitals:   Vitals:    01/08/24 1054   BP: (!) 139/92   BP Location: Right arm   Patient Position: Sitting   BP Method: Medium (Automatic)   Pulse: 97   Resp: 16   SpO2: 98%   Weight: 77.8 kg (171  lb 8.3 oz)     BMI: Body mass index is 25.33 kg/m².    Physical Exam  Vitals and nursing note reviewed.   Constitutional:       Appearance: He is well-developed.   HENT:      Head: Normocephalic and atraumatic.   Eyes:      Pupils: Pupils are equal, round, and reactive to light.   Cardiovascular:      Rate and Rhythm: Normal rate and regular rhythm.   Pulmonary:      Effort: Pulmonary effort is normal.      Breath sounds: Normal breath sounds.   Abdominal:      General: Bowel sounds are normal.      Palpations: Abdomen is soft.   Musculoskeletal:         General: Normal range of motion.      Cervical back: Normal range of motion and neck supple.   Skin:     General: Skin is warm and dry.   Neurological:      Mental Status: He is alert and oriented to person, place, and time.   Psychiatric:         Behavior: Behavior normal.         Thought Content: Thought content normal.         Judgment: Judgment normal.         Laboratory Data:  Labs have been reviewed.    Lab Results   Component Value Date    WBC 2.10 (L) 01/08/2024    HGB 13.8 (L) 01/08/2024    HCT 40.9 01/08/2024     (H) 01/08/2024     01/08/2024       Imaging:        Assessment:       1. Myelodysplastic syndrome    2. Pancytopenia    3. Chemotherapy-induced neutropenia    4. 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 Anemia due to antineoplastic chemotherapy    7. Chemotherapy-induced nausea and vomiting           Plan:     Myelodysplastic syndrome / neutropenia due to chemo.  - bone marrow biopsy (4/26/23) revealed myelodysplastic syndrome.  - he met with Dr. Hickey on 5/8/23. He recommended repeat bone marrow biopsy.  - bone marrow biopsy (5/23/23) revealed myelodysplastic syndrome with excess blasts.  - he met with Dr. Hickey on 5/30/23. He recommended azacitidine (7-day) with venetoclax.  - he has received several blood transfusions in June 2023  - he met with Dr. Hickey on 6/20/23. Information  "per note:  "HCT-CI of 1 (intermediate risk). Will plan to proceed with transplant in the next 2-3 months (as soon as a donor is identified). HLA typing sent."  - bone marrow biopsy (7/3/23) revealed no increased blasts! He will follow up with Dr. Hickey on 7/11/23  - he has received blood/platelets on 7/5/23.  - he has not received blood/platelet transfusion in several weeks.  - he saw Dr. Hickey on 8/15/23. Per his note:  " Will plan to proceed with transplant ASAP (as soon as a donor is identified). HLA typing sent."  - he underwent bone marrow biopsy on 9/6/23.   - change vidaza to 5 days, and venetoclax from 14 days to 7 days every 28 days (due to cytopenias).  - plan is still for transplant once a donor is found.  - Labs have been reviewed. Absolute neutrophil count is 0.5 k/uL. Hemoglobin is 13.8g/dL. Platelet count is 158 k/uL.  - okay to proceed with cycle #7 of azacitidine/venetoclax, starting on on 1/8/24.   - continue anti-infectious therapy per Dr. Hickey's plan.  - return to clinic in 4 weeks in preparation for cycle #8 of azacitidine/venetoclax.    2. Chemotherapy-induced nausea/vomiting  - mild symptoms. Continue phenergan as needed.    3. Advance Care Planning     Power of   After our discussion (at previous visit), the patient has decided not to complete a HCPOA.       - return to clinic in 4 weeks in preparation for cycle #8 of azacitidine/venetoclax.    Harry Diamond M.D.  Hematology/Oncology  [facility] - [address], [address]  [place] [address]  Phone: [phone]  Fax: [phone]  "

## 2024-01-08 ENCOUNTER — OFFICE VISIT (OUTPATIENT)
Dept: HEMATOLOGY/ONCOLOGY | Facility: CLINIC | Age: 69
End: 2024-01-08
Payer: MEDICARE

## 2024-01-08 ENCOUNTER — INFUSION (OUTPATIENT)
Dept: INFUSION THERAPY | Facility: HOSPITAL | Age: 69
End: 2024-01-08
Attending: INTERNAL MEDICINE
Payer: MEDICARE

## 2024-01-08 VITALS
RESPIRATION RATE: 16 BRPM | OXYGEN SATURATION: 98 % | SYSTOLIC BLOOD PRESSURE: 139 MMHG | DIASTOLIC BLOOD PRESSURE: 92 MMHG | BODY MASS INDEX: 25.33 KG/M2 | WEIGHT: 171.5 LBS | HEART RATE: 97 BPM

## 2024-01-08 VITALS
DIASTOLIC BLOOD PRESSURE: 92 MMHG | RESPIRATION RATE: 16 BRPM | OXYGEN SATURATION: 98 % | HEIGHT: 69 IN | WEIGHT: 171.5 LBS | BODY MASS INDEX: 25.4 KG/M2 | TEMPERATURE: 99 F | SYSTOLIC BLOOD PRESSURE: 139 MMHG | HEART RATE: 97 BPM

## 2024-01-08 DIAGNOSIS — D84.81 IMMUNODEFICIENCY SECONDARY TO NEOPLASM: ICD-10-CM

## 2024-01-08 DIAGNOSIS — T45.1X5A CHEMOTHERAPY-INDUCED NAUSEA AND VOMITING: ICD-10-CM

## 2024-01-08 DIAGNOSIS — D46.9 MYELODYSPLASTIC SYNDROME: Primary | ICD-10-CM

## 2024-01-08 DIAGNOSIS — D84.821 IMMUNODEFICIENCY DUE TO DRUG THERAPY: ICD-10-CM

## 2024-01-08 DIAGNOSIS — Z76.82 STEM CELL TRANSPLANT CANDIDATE: Primary | ICD-10-CM

## 2024-01-08 DIAGNOSIS — D70.1 CHEMOTHERAPY-INDUCED NEUTROPENIA: ICD-10-CM

## 2024-01-08 DIAGNOSIS — D61.818 PANCYTOPENIA: ICD-10-CM

## 2024-01-08 DIAGNOSIS — R11.2 CHEMOTHERAPY-INDUCED NAUSEA AND VOMITING: ICD-10-CM

## 2024-01-08 DIAGNOSIS — T45.1X5A CHEMOTHERAPY-INDUCED NEUTROPENIA: ICD-10-CM

## 2024-01-08 DIAGNOSIS — D64.81 ANEMIA DUE TO ANTINEOPLASTIC CHEMOTHERAPY: ICD-10-CM

## 2024-01-08 DIAGNOSIS — Z79.899 IMMUNODEFICIENCY DUE TO DRUG THERAPY: ICD-10-CM

## 2024-01-08 DIAGNOSIS — T45.1X5A ANEMIA DUE TO ANTINEOPLASTIC CHEMOTHERAPY: ICD-10-CM

## 2024-01-08 DIAGNOSIS — D49.9 IMMUNODEFICIENCY SECONDARY TO NEOPLASM: ICD-10-CM

## 2024-01-08 DIAGNOSIS — D46.9 MYELODYSPLASTIC SYNDROME: ICD-10-CM

## 2024-01-08 PROCEDURE — 25000003 PHARM REV CODE 250: Performed by: INTERNAL MEDICINE

## 2024-01-08 PROCEDURE — 63600175 PHARM REV CODE 636 W HCPCS: Performed by: INTERNAL MEDICINE

## 2024-01-08 PROCEDURE — 96401 CHEMO ANTI-NEOPL SQ/IM: CPT

## 2024-01-08 PROCEDURE — 99999 PR PBB SHADOW E&M-EST. PATIENT-LVL IV: CPT | Mod: PBBFAC,,, | Performed by: INTERNAL MEDICINE

## 2024-01-08 PROCEDURE — 99215 OFFICE O/P EST HI 40 MIN: CPT | Mod: S$GLB,,, | Performed by: INTERNAL MEDICINE

## 2024-01-08 RX ORDER — ONDANSETRON HYDROCHLORIDE 8 MG/1
16 TABLET, FILM COATED ORAL
Status: CANCELLED | OUTPATIENT
Start: 2024-01-09

## 2024-01-08 RX ORDER — ONDANSETRON HYDROCHLORIDE 8 MG/1
16 TABLET, FILM COATED ORAL
Status: CANCELLED | OUTPATIENT
Start: 2024-01-12

## 2024-01-08 RX ORDER — ONDANSETRON HYDROCHLORIDE 8 MG/1
16 TABLET, FILM COATED ORAL
Status: COMPLETED | OUTPATIENT
Start: 2024-01-08 | End: 2024-01-08

## 2024-01-08 RX ORDER — ONDANSETRON HYDROCHLORIDE 8 MG/1
16 TABLET, FILM COATED ORAL
Status: CANCELLED | OUTPATIENT
Start: 2024-01-10

## 2024-01-08 RX ORDER — AZACITIDINE 100 MG/1
75 INJECTION, POWDER, LYOPHILIZED, FOR SOLUTION INTRAVENOUS; SUBCUTANEOUS
Status: CANCELLED | OUTPATIENT
Start: 2024-01-11

## 2024-01-08 RX ORDER — AZACITIDINE 100 MG/1
75 INJECTION, POWDER, LYOPHILIZED, FOR SOLUTION INTRAVENOUS; SUBCUTANEOUS
Status: CANCELLED | OUTPATIENT
Start: 2024-01-08

## 2024-01-08 RX ORDER — ONDANSETRON HYDROCHLORIDE 8 MG/1
16 TABLET, FILM COATED ORAL
Status: CANCELLED | OUTPATIENT
Start: 2024-01-08

## 2024-01-08 RX ORDER — ONDANSETRON HYDROCHLORIDE 8 MG/1
16 TABLET, FILM COATED ORAL
Status: CANCELLED | OUTPATIENT
Start: 2024-01-11

## 2024-01-08 RX ORDER — AZACITIDINE 100 MG/1
75 INJECTION, POWDER, LYOPHILIZED, FOR SOLUTION INTRAVENOUS; SUBCUTANEOUS
Status: COMPLETED | OUTPATIENT
Start: 2024-01-08 | End: 2024-01-08

## 2024-01-08 RX ORDER — AZACITIDINE 100 MG/1
75 INJECTION, POWDER, LYOPHILIZED, FOR SOLUTION INTRAVENOUS; SUBCUTANEOUS
Status: CANCELLED | OUTPATIENT
Start: 2024-01-12

## 2024-01-08 RX ORDER — AZACITIDINE 100 MG/1
75 INJECTION, POWDER, LYOPHILIZED, FOR SOLUTION INTRAVENOUS; SUBCUTANEOUS
Status: CANCELLED | OUTPATIENT
Start: 2024-01-10

## 2024-01-08 RX ORDER — AZACITIDINE 100 MG/1
75 INJECTION, POWDER, LYOPHILIZED, FOR SOLUTION INTRAVENOUS; SUBCUTANEOUS
Status: CANCELLED | OUTPATIENT
Start: 2024-01-09

## 2024-01-08 RX ADMIN — AZACITIDINE 140 MG: 100 INJECTION, POWDER, LYOPHILIZED, FOR SOLUTION INTRAVENOUS; SUBCUTANEOUS at 12:01

## 2024-01-08 RX ADMIN — ONDANSETRON HYDROCHLORIDE 16 MG: 8 TABLET, FILM COATED ORAL at 11:01

## 2024-01-08 NOTE — Clinical Note
Good morning,  He's starting cycle #7 today. Doing well. I don't see a follow-up scheduled with you. Can you or your team schedule a follow-up appointment? They have questions about his sister, who lives in [place]. She's willing to travel here for testing for donor compatibility testing.  Thanks!!

## 2024-01-08 NOTE — PLAN OF CARE
Patient tolerated Cycle 7 Day 1 Vidaza injection well, no complaints at this time. Next appointments scheduled and pt  d/c in no acute distress.

## 2024-01-09 ENCOUNTER — INFUSION (OUTPATIENT)
Dept: INFUSION THERAPY | Facility: HOSPITAL | Age: 69
End: 2024-01-09
Attending: INTERNAL MEDICINE
Payer: MEDICARE

## 2024-01-09 ENCOUNTER — TELEPHONE (OUTPATIENT)
Dept: HEMATOLOGY/ONCOLOGY | Facility: CLINIC | Age: 69
End: 2024-01-09
Payer: MEDICARE

## 2024-01-09 VITALS
HEART RATE: 98 BPM | TEMPERATURE: 98 F | RESPIRATION RATE: 17 BRPM | OXYGEN SATURATION: 100 % | DIASTOLIC BLOOD PRESSURE: 74 MMHG | SYSTOLIC BLOOD PRESSURE: 129 MMHG

## 2024-01-09 DIAGNOSIS — D46.9 MYELODYSPLASTIC SYNDROME: Primary | ICD-10-CM

## 2024-01-09 DIAGNOSIS — E53.8 B12 DEFICIENCY: Primary | ICD-10-CM

## 2024-01-09 PROCEDURE — 25000003 PHARM REV CODE 250: Performed by: INTERNAL MEDICINE

## 2024-01-09 PROCEDURE — 96401 CHEMO ANTI-NEOPL SQ/IM: CPT

## 2024-01-09 PROCEDURE — 63600175 PHARM REV CODE 636 W HCPCS: Performed by: INTERNAL MEDICINE

## 2024-01-09 RX ORDER — ONDANSETRON HYDROCHLORIDE 8 MG/1
16 TABLET, FILM COATED ORAL
Status: COMPLETED | OUTPATIENT
Start: 2024-01-09 | End: 2024-01-09

## 2024-01-09 RX ORDER — AZACITIDINE 100 MG/1
75 INJECTION, POWDER, LYOPHILIZED, FOR SOLUTION INTRAVENOUS; SUBCUTANEOUS
Status: COMPLETED | OUTPATIENT
Start: 2024-01-09 | End: 2024-01-09

## 2024-01-09 RX ORDER — LANOLIN ALCOHOL/MO/W.PET/CERES
2000 CREAM (GRAM) TOPICAL DAILY
Qty: 180 TABLET | Refills: 3 | Status: SHIPPED | OUTPATIENT
Start: 2024-01-09

## 2024-01-09 RX ADMIN — AZACITIDINE 140 MG: 100 INJECTION, POWDER, LYOPHILIZED, FOR SOLUTION INTRAVENOUS; SUBCUTANEOUS at 01:01

## 2024-01-09 RX ADMIN — ONDANSETRON HYDROCHLORIDE 16 MG: 8 TABLET, FILM COATED ORAL at 12:01

## 2024-01-09 NOTE — PLAN OF CARE
Patient tolerated cycle 7 day 2 vidaza injection well, vss, no complaints at this time. Next appointment scheduled and pt d/c in no acute distress.

## 2024-01-10 ENCOUNTER — INFUSION (OUTPATIENT)
Dept: INFUSION THERAPY | Facility: HOSPITAL | Age: 69
End: 2024-01-10
Attending: INTERNAL MEDICINE
Payer: MEDICARE

## 2024-01-10 VITALS
RESPIRATION RATE: 18 BRPM | OXYGEN SATURATION: 97 % | WEIGHT: 171.5 LBS | SYSTOLIC BLOOD PRESSURE: 126 MMHG | HEIGHT: 69 IN | DIASTOLIC BLOOD PRESSURE: 85 MMHG | TEMPERATURE: 99 F | BODY MASS INDEX: 25.4 KG/M2 | HEART RATE: 96 BPM

## 2024-01-10 DIAGNOSIS — D46.9 MYELODYSPLASTIC SYNDROME: Primary | ICD-10-CM

## 2024-01-10 PROCEDURE — 96401 CHEMO ANTI-NEOPL SQ/IM: CPT

## 2024-01-10 PROCEDURE — 25000003 PHARM REV CODE 250: Performed by: INTERNAL MEDICINE

## 2024-01-10 PROCEDURE — 63600175 PHARM REV CODE 636 W HCPCS: Performed by: INTERNAL MEDICINE

## 2024-01-10 RX ORDER — ONDANSETRON HYDROCHLORIDE 8 MG/1
16 TABLET, FILM COATED ORAL
Status: COMPLETED | OUTPATIENT
Start: 2024-01-10 | End: 2024-01-10

## 2024-01-10 RX ORDER — AZACITIDINE 100 MG/1
75 INJECTION, POWDER, LYOPHILIZED, FOR SOLUTION INTRAVENOUS; SUBCUTANEOUS
Status: COMPLETED | OUTPATIENT
Start: 2024-01-10 | End: 2024-01-10

## 2024-01-10 RX ADMIN — AZACITIDINE 140 MG: 100 INJECTION, POWDER, LYOPHILIZED, FOR SOLUTION INTRAVENOUS; SUBCUTANEOUS at 01:01

## 2024-01-10 RX ADMIN — ONDANSETRON HYDROCHLORIDE 16 MG: 8 TABLET, FILM COATED ORAL at 12:01

## 2024-01-11 ENCOUNTER — INFUSION (OUTPATIENT)
Dept: INFUSION THERAPY | Facility: HOSPITAL | Age: 69
End: 2024-01-11
Attending: INTERNAL MEDICINE
Payer: MEDICARE

## 2024-01-11 VITALS
WEIGHT: 171.5 LBS | DIASTOLIC BLOOD PRESSURE: 66 MMHG | RESPIRATION RATE: 18 BRPM | SYSTOLIC BLOOD PRESSURE: 108 MMHG | OXYGEN SATURATION: 96 % | BODY MASS INDEX: 25.4 KG/M2 | HEIGHT: 69 IN | HEART RATE: 92 BPM | TEMPERATURE: 98 F

## 2024-01-11 DIAGNOSIS — D46.9 MYELODYSPLASTIC SYNDROME: Primary | ICD-10-CM

## 2024-01-11 PROCEDURE — 25000003 PHARM REV CODE 250: Performed by: INTERNAL MEDICINE

## 2024-01-11 PROCEDURE — 63600175 PHARM REV CODE 636 W HCPCS: Performed by: INTERNAL MEDICINE

## 2024-01-11 PROCEDURE — 96401 CHEMO ANTI-NEOPL SQ/IM: CPT

## 2024-01-11 RX ORDER — ONDANSETRON HYDROCHLORIDE 8 MG/1
16 TABLET, FILM COATED ORAL
Status: COMPLETED | OUTPATIENT
Start: 2024-01-11 | End: 2024-01-11

## 2024-01-11 RX ORDER — AZACITIDINE 100 MG/1
75 INJECTION, POWDER, LYOPHILIZED, FOR SOLUTION INTRAVENOUS; SUBCUTANEOUS
Status: COMPLETED | OUTPATIENT
Start: 2024-01-11 | End: 2024-01-11

## 2024-01-11 RX ADMIN — ONDANSETRON HYDROCHLORIDE 16 MG: 8 TABLET, FILM COATED ORAL at 12:01

## 2024-01-11 RX ADMIN — AZACITIDINE 140 MG: 100 INJECTION, POWDER, LYOPHILIZED, FOR SOLUTION INTRAVENOUS; SUBCUTANEOUS at 01:01

## 2024-01-12 ENCOUNTER — INFUSION (OUTPATIENT)
Dept: INFUSION THERAPY | Facility: HOSPITAL | Age: 69
End: 2024-01-12
Attending: INTERNAL MEDICINE
Payer: MEDICARE

## 2024-01-12 VITALS
SYSTOLIC BLOOD PRESSURE: 120 MMHG | OXYGEN SATURATION: 94 % | WEIGHT: 171.5 LBS | DIASTOLIC BLOOD PRESSURE: 73 MMHG | BODY MASS INDEX: 25.4 KG/M2 | TEMPERATURE: 98 F | HEIGHT: 69 IN | RESPIRATION RATE: 18 BRPM | HEART RATE: 99 BPM

## 2024-01-12 DIAGNOSIS — D46.9 MYELODYSPLASTIC SYNDROME: Primary | ICD-10-CM

## 2024-01-12 PROCEDURE — 96401 CHEMO ANTI-NEOPL SQ/IM: CPT

## 2024-01-12 PROCEDURE — 63600175 PHARM REV CODE 636 W HCPCS: Performed by: INTERNAL MEDICINE

## 2024-01-12 PROCEDURE — 25000003 PHARM REV CODE 250: Performed by: INTERNAL MEDICINE

## 2024-01-12 RX ORDER — AZACITIDINE 100 MG/1
75 INJECTION, POWDER, LYOPHILIZED, FOR SOLUTION INTRAVENOUS; SUBCUTANEOUS
Status: COMPLETED | OUTPATIENT
Start: 2024-01-12 | End: 2024-01-12

## 2024-01-12 RX ORDER — ONDANSETRON HYDROCHLORIDE 8 MG/1
16 TABLET, FILM COATED ORAL
Status: COMPLETED | OUTPATIENT
Start: 2024-01-12 | End: 2024-01-12

## 2024-01-12 RX ADMIN — ONDANSETRON HYDROCHLORIDE 16 MG: 8 TABLET, FILM COATED ORAL at 12:01

## 2024-01-12 RX ADMIN — AZACITIDINE 140 MG: 100 INJECTION, POWDER, LYOPHILIZED, FOR SOLUTION INTRAVENOUS; SUBCUTANEOUS at 01:01

## 2024-01-19 ENCOUNTER — OFFICE VISIT (OUTPATIENT)
Dept: HEMATOLOGY/ONCOLOGY | Facility: CLINIC | Age: 69
End: 2024-01-19
Payer: MEDICARE

## 2024-01-19 VITALS
DIASTOLIC BLOOD PRESSURE: 67 MMHG | HEIGHT: 69 IN | BODY MASS INDEX: 25.81 KG/M2 | HEART RATE: 88 BPM | SYSTOLIC BLOOD PRESSURE: 138 MMHG | WEIGHT: 174.25 LBS | TEMPERATURE: 98 F | OXYGEN SATURATION: 96 %

## 2024-01-19 DIAGNOSIS — D61.818 PANCYTOPENIA: ICD-10-CM

## 2024-01-19 DIAGNOSIS — E53.8 B12 DEFICIENCY: ICD-10-CM

## 2024-01-19 DIAGNOSIS — G47.00 INSOMNIA, UNSPECIFIED TYPE: ICD-10-CM

## 2024-01-19 DIAGNOSIS — D46.9 MYELODYSPLASTIC SYNDROME: ICD-10-CM

## 2024-01-19 DIAGNOSIS — D49.9 IMMUNODEFICIENCY SECONDARY TO NEOPLASM: ICD-10-CM

## 2024-01-19 DIAGNOSIS — Z76.82 STEM CELL TRANSPLANT CANDIDATE: Primary | ICD-10-CM

## 2024-01-19 DIAGNOSIS — D84.81 IMMUNODEFICIENCY SECONDARY TO NEOPLASM: ICD-10-CM

## 2024-01-19 PROCEDURE — 99215 OFFICE O/P EST HI 40 MIN: CPT | Mod: S$GLB,,, | Performed by: INTERNAL MEDICINE

## 2024-01-19 PROCEDURE — 99999 PR PBB SHADOW E&M-EST. PATIENT-LVL III: CPT | Mod: PBBFAC,,, | Performed by: INTERNAL MEDICINE

## 2024-01-19 RX ORDER — ZOLPIDEM TARTRATE 10 MG/1
10 TABLET ORAL NIGHTLY PRN
Qty: 30 TABLET | Refills: 0 | Status: SHIPPED | OUTPATIENT
Start: 2024-01-19 | End: 2024-02-19

## 2024-01-19 NOTE — PROGRESS NOTES
Section of Hematology and Stem Cell Transplantation  Follow Up Visit     Date of visit: 1/19/24  Visit diagnosis: Stem cell transplant candidate [Z76.82]  Referred by:  Harry Diamond MD    Oncologic History:     Primary Oncologic Diagnosis: Myelodysplastic syndrome excess blasts 2, IPSS-M very high risk    4/12/23: Initial evaluation by Dr. Diamond of anemia. WBC 2.71, Hgb 7.1, Plts  400. Prior to this visit, he was in [place] for a dental procedure. His symptoms of fatigue started after extractions. Workup by Dr. Diamond unremarkable.   4/26/23: Bone marrow biopsy revealed a hypercellular marrow (80-90%) with increased blasts (8-12%) concerning for MDS EB2. However, sample was limited.   5/23/23: Repeat bone marrow biopsy revealed myelodysplastic syndrome with excess blasts 2 (blasts 16-17%). 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 NGS with ASXL1 (44%), EZH2 (87%), IDH2 (42%), STAG2 (89%), U2AF1 (3%).  6/12/23: Cycle 1 of azacitidine 75 mg/m2 plus venetoclax x14 of 28 days.   7/3/23: Bone marrow biopsy at the end of cycle 1 revealed a hypocellular marrow, no blasts, trilineage hypoplasia and dyspoiesis with increased micromegakaryocytes. FISH with trisomy 8 (three copies of HANU2R2). NGS with ASXL1 (20%), EZH2 (40%), IDH2 (17%), STAG2 (38%).  9/6/23: Bone marrow biopsy revealed dysplastic changes but blasts were not increased. Diploid karyotype. NGS with ASXL1 (16%).    History of Present Ilness:   Guillaume Salinas (Guillaume) is a pleasant 68 y.o.male with PVD, CAD, HTN who presents for follow up. He enjoyed his recent trip to [place]. He feels well at this time. He is having pain at the injection sites.     Patient was seen today in conjunction with a .    PAST MEDICAL HISTORY:   Past Medical History:   Diagnosis Date    Anticoagulant long-term use     Coronary artery disease     Hypertension     Peripheral vascular disease, unspecified        PAST SURGICAL HISTORY:   Past Surgical  History:   Procedure Laterality Date    BONE MARROW BIOPSY Left 2023    Procedure: Biopsy-bone marrow;  Surgeon: Harry Diamond MD;  Location: [facility] OR;  Service: Oncology;  Laterality: Left;    COLONOSCOPY N/A 2022    Procedure: COLONOSCOPY Golytely Vaccinated will bring cards;  Surgeon: Dereje Simon MD;  Location: [facility] ENDO;  Service: Endoscopy;  Laterality: N/A;  Do not cancel this order       PAST SOCIAL HISTORY:  Social History     Tobacco Use    Smoking status: Former     Current packs/day: 0.00     Average packs/day: 0.3 packs/day for 50.0 years (12.5 ttl pk-yrs)     Types: Cigarettes     Start date: 3/1/1973     Quit date: 3/1/2023     Years since quittin.8    Smokeless tobacco: Never   Substance Use Topics    Alcohol use: Not Currently    Drug use: Never       FAMILY HISTORY:  Family History   Problem Relation Age of Onset    Hypertension Mother     Cancer Father     Cancer Brother     No Known Problems Daughter     No Known Problems Daughter     No Known Problems Son        CURRENT MEDICATIONS:   Current Outpatient Medications   Medication Sig    acyclovir (ZOVIRAX) 400 MG tablet Take 1 tablet (400 mg total) by mouth 2 (two) times daily.    aspirin (ECOTRIN) 81 MG EC tablet Take 1 tablet (81 mg total) by mouth once daily.    atorvastatin (LIPITOR) 80 MG tablet Take 1 tablet (80 mg total) by mouth once daily.    carvediloL (COREG) 6.25 MG tablet TAKE 1 TABLET(6.25 MG) BY MOUTH TWICE DAILY WITH MEALS    cilostazoL (PLETAL) 50 MG Tab Take 1 tablet (50 mg total) by mouth 2 (two) times daily.    cyanocobalamin (VITAMIN B-12) 1000 MCG tablet Take 2 tablets (2,000 mcg total) by mouth once daily.    docusate sodium (COLACE) 100 MG capsule Take 100 mg by mouth 2 (two) times daily as needed for Constipation.    gabapentin (NEURONTIN) 300 MG capsule TAKE 1 CAPSULE(300 MG) BY MOUTH THREE TIMES DAILY    levoFLOXacin (LEVAQUIN) 500 MG tablet Take 1 tablet (500 mg total) by mouth once daily.     promethazine (PHENERGAN) 25 MG tablet Take 1 tablet (25 mg total) by mouth every 8 (eight) hours as needed for Nausea.    venetoclax (VENCLEXTA) 100 mg Tab Take 1 tablet (100 mg) by mouth once daily on days 1-7 of a 28-day cycle. Do not discard any remaining tablets.    voriconazole (VFEND) 200 MG Tab Take 1 tablet (200 mg total) by mouth 2 (two) times daily.    zolpidem (AMBIEN) 10 mg Tab Take 1 tablet (10 mg total) by mouth nightly as needed (insomnia).     No current facility-administered medications for this visit.       ALLERGIES:   Review of patient's allergies indicates:  No Known Allergies    Review of Systems:     Pertinent positives and negatives included in the HPI. Otherwise a complete review of systems is negative.    Physical Exam:     Vitals:    01/19/24 0851   BP: 138/67   Pulse: 88   Temp: 98.3 °F (36.8 °C)     General: Appears well, NAD  Pulmonary: CTAB, no increased work of breathing, no W/R/C  Cardiovascular: S1S2 normal, RRR, no M/R/G  Abdominal: Soft, NT, ND, BS+, no HSM  Extremities: No C/C/E  Neurological: AAOx4, grossly normal, no focal deficits  Dermatologic: No appreciable rashes or lesions     ECOG Performance Status: (foot note - ECOG PS provided by [employer]cology [employer]) 1 - Symptomatic but completely ambulatory    Karnofsky Performance Score:  80%- Normal Activity with Effort: Some Symptoms of Disease    Labs:   Lab Results   Component Value Date    WBC 2.10 (L) 01/08/2024    RBC 3.92 (L) 01/08/2024    HGB 13.8 (L) 01/08/2024    HCT 40.9 01/08/2024     (H) 01/08/2024    MCH 35.2 (H) 01/08/2024    MCHC 33.7 01/08/2024    RDW 19.1 (H) 01/08/2024     01/08/2024    MPV 10.2 01/08/2024    GRAN 24.0 (L) 01/08/2024    LYMPH CANCELED 01/08/2024    LYMPH 67.0 (H) 01/08/2024    MONO CANCELED 01/08/2024    MONO 2.0 (L) 01/08/2024    EOS CANCELED 01/08/2024    BASO CANCELED 01/08/2024    EOSINOPHIL 0.0 01/08/2024    BASOPHIL 2.0 (H) 01/08/2024       CMP  Sodium   Date  Value Ref Range Status   01/08/2024 141 136 - 145 mmol/L Final     Potassium   Date Value Ref Range Status   01/08/2024 4.4 3.5 - 5.1 mmol/L Final     Chloride   Date Value Ref Range Status   01/08/2024 106 95 - 110 mmol/L Final     CO2   Date Value Ref Range Status   01/08/2024 24 23 - 29 mmol/L Final     Glucose   Date Value Ref Range Status   01/08/2024 146 (H) 70 - 110 mg/dL Final     BUN   Date Value Ref Range Status   01/08/2024 17 8 - 23 mg/dL Final     Creatinine   Date Value Ref Range Status   01/08/2024 1.1 0.5 - 1.4 mg/dL Final     Calcium   Date Value Ref Range Status   01/08/2024 9.2 8.7 - 10.5 mg/dL Final     Total Protein   Date Value Ref Range Status   01/08/2024 7.0 6.0 - 8.4 g/dL Final     Albumin   Date Value Ref Range Status   01/08/2024 3.6 3.5 - 5.2 g/dL Final     Total Bilirubin   Date Value Ref Range Status   01/08/2024 0.3 0.1 - 1.0 mg/dL Final     Comment:     For infants and newborns, interpretation of results should be based  on gestational age, weight and in agreement with clinical  observations.    Premature Infant recommended reference ranges:  Up to 24 hours.............<8.0 mg/dL  Up to 48 hours............<12.0 mg/dL  3-5 days..................<15.0 mg/dL  6-29 days.................<15.0 mg/dL       Alkaline Phosphatase   Date Value Ref Range Status   01/08/2024 79 55 - 135 U/L Final     AST   Date Value Ref Range Status   01/08/2024 16 10 - 40 U/L Final     ALT   Date Value Ref Range Status   01/08/2024 21 10 - 44 U/L Final     Anion Gap   Date Value Ref Range Status   01/08/2024 11 8 - 16 mmol/L Final     eGFR if    Date Value Ref Range Status   08/27/2021 >60 >60 mL/min/1.73 m^2 Final   08/27/2021 >60 >60 mL/min/1.73 m^2 Final   08/27/2021 >60 >60 mL/min/1.73 m^2 Final     eGFR if non    Date Value Ref Range Status   08/27/2021 57 (A) >60 mL/min/1.73 m^2 Final     Comment:     Calculation used to obtain the estimated glomerular filtration  rate  (eGFR) is the CKD-EPI equation.      08/27/2021 57 (A) >60 mL/min/1.73 m^2 Final     Comment:     Calculation used to obtain the estimated glomerular filtration  rate (eGFR) is the CKD-EPI equation.      08/27/2021 57 (A) >60 mL/min/1.73 m^2 Final     Comment:     Calculation used to obtain the estimated glomerular filtration  rate (eGFR) is the CKD-EPI equation.          Imaging:   No results found for this or any previous visit (from the past 2160 hour(s)).    Pathology:  Reviewed     Assessment and Plan:   Guillaume Salinas (Guillaume) is a pleasant 68 y.o.male with PVD, CAD, HTN who presents for follow up.    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 NGS with ASXL1 (44%), EZH2 (87%), IDH2 (42%), STAG2 (89%), U2AF1 (3%). He started treatment with azacitidine 75 mg/m2 daily x7 days plus venetoclax 100mg (voriconazole) daily x14 of 28 days. Venetoclax decreased to 7 days with cycle 3. Bone marrow biopsy 9/6/23 revealed dysplastic changes but blasts were not increased. Diploid karyotype. NGS with ASXL1 (16%).  Continue azacitidine x5 days plus venetoclax x7 of 28 days as above. Delay cycle 10 (after March infusion) for travel to [place].  Prior to starting each cycle - ANC >500 and Plts >50.    Stem cell transplant candidate: We discussed the role for allogeneic stem cell transplantation in this disease process as a potentially curative option. We had an extensive discussion about the rationale, logistics, risks, and benefits. We reviewed the requirement to stay in the [place] area for 100 days with a caregiver at all times. We discussed the risks, including infection, graft failure, organ toxicity, graft versus host disease, relapse of disease, and secondary cancers. We reviewed the need for long-term immunosuppression and need for close monitoring. HCT-CI of 1 (intermediate risk). We discussed donors. His sister's typing is still  pending. Given her age and timing for transplant, I believe it would be best to move forward with transplant using one of his children as a donor. We discussed pros and cons, and they are in agreement. They have one more trip to [place] planned in March, so will plan for transplant after March (tentatively April).   Coordinator: Fay Stephens  Regimen: Flu/Cy/TBI  Donor: haplo (child) - pending DSAs  Graft source: BM    Stem cell donors: 10 siblings (9 are over age 65). One sister (age 63) - Radha. Segundo Magdaleno (age 43), Padmini Rush (age 39), Susy (age 36).   Will proceed with one of his children depending on DSAs and haplotype.     Symptomatic anemia: Related to MDS. Twice weekly monitoring in [place]. Transfuse for Hgb <7.    Thrombocytopenia: Related to MDS. Monitor and transfuse for Plts <10.    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     Orders/Follow Up:      Orders Placed This Encounter    zolpidem (AMBIEN) 10 mg Tab       Route Chart for Scheduling    BMT Chart Routing      Follow up with physician 6 weeks.   Follow up with [facility]    Provider visit type    Infusion scheduling note    Injection scheduling note    Labs   Scheduling:  Preferred lab:  Lab interval:  Labs as scheduled weekly   Imaging    Pharmacy appointment    Other referrals                Treatment Plan Information   OP AZACITIDINE 7-DAY (SUB-Q)   Harry Diamond MD   Upcoming Treatment Dates - OP AZACITIDINE 7-DAY (SUB-Q)    2/5/2024       Pre-Medications       ondansetron tablet 16 mg       Chemotherapy       azaCITIDine (VIDAZA) chemo injection 140 mg  2/6/2024       Pre-Medications       ondansetron tablet 16 mg       Chemotherapy       azaCITIDine (VIDAZA) chemo injection 140 mg  2/7/2024       Pre-Medications       ondansetron tablet 16 mg       Chemotherapy       azaCITIDine (VIDAZA) chemo injection 140 mg  2/8/2024       Pre-Medications       ondansetron tablet 16 mg        Chemotherapy       azaCITIDine (VIDAZA) chemo injection 140 mg    Advance Care Planning   Date: 05/08/2023  We reviewed his underlying diagnosis including natural history, prognosis, and various treatment options. He remains interested in pursuing any and all treatment options in an effort to improve his quality and quantity of life. Will discuss treatment options pending biopsy results.        Total time of this visit was 40 minutes, including time spent face to face with patient, and also in preparing for today's visit for MDM and documentation. (Medical Decision Making, including consideration of possible diagnoses, management options, complex medical record review, review of diagnostic tests and information, consideration and discussion of significant complications based on comorbidities, and discussion with providers involved with the care of the patient). Greater than 50% was spent face to face with the patient counseling and coordinating care.    Paco Hickey MD  Hematology, Oncology, and Stem Cell Transplantation  [facility]

## 2024-01-19 NOTE — Clinical Note
Matti Fay, We talked today about waiting for his sister's typing vs just going forward using one of his children. I think it's best to just move forward using one of his children rather than waiting for his sister. Do you mind double checking that we've looked for DSAs for him with them? Also can you send me their MRNs so I can see their haplo types? They want to go to [place] in March, so we can plan out for April or May depending on the transplant calendar.   Harry - just keeping you in the loop!   Thanks, Paco

## 2024-01-22 LAB
CLASS I ANTIBODIES - LUMINEX: NEGATIVE
CLASS II ANTIBODY COMMENTS - LUMINEX: NORMAL
CPRA %: 0
LUMINEX ADSORBED SERUM CLASS I & II SPECIFICITY INTERPRETATION: NORMAL
SERUM COLLECTION DT - LUMINEX CLASS I: NORMAL
SERUM COLLECTION DT - LUMINEX CLASS II: NORMAL
SPCL1 TESTING DATE: NORMAL
SPCL2 TESTING DATE: NORMAL
SPLUA TESTING DATE: NORMAL

## 2024-01-28 NOTE — PROGRESS NOTES
PATIENT: Guillaume Salinas  MRN: [id number]  DATE: 2/2/2024    Diagnosis:   1. Myelodysplastic syndrome    2. Pancytopenia    3. Chemotherapy-induced neutropenia    4. 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 Anemia due to antineoplastic chemotherapy    7. Chemotherapy-induced nausea and vomiting    8. Aortic atherosclerosis    9. Secondary thrombocytopenia      Chief Complaint: myelodysplastic syndrome    Oncologic History:      Oncologic History Myelodysplastic syndrome      Oncologic Treatment Azacitidine/venetoclax (start date 6/12/23).      Pathology 7/3/23:  BONE MARROW ASPIRATE, TOUCH PREP, CLOT, AND DECALCIFIED NEEDLE CORE BIOPSY:   LEFT POSTEROSUPERIOR ILIAC CREST   - MORPHOLOGIC AND IMMUNOPHENOTYPIC FEATURES OF PERSISTENT MDS   - LIMITED, SUBOPTIMAL SPECIMEN: Findings may not be entirely representative   - Hypocellular marrow (20% total cellularity) with no increased CD34+ blasts, chemotherapeutic changes, and scant residual trilineage hypoplasia and dyspoiesis with increased numbers of micromegakaryocytes   - Relative lymphocytosis including small, well-defined lymphoid aggregates (favor benign/reactive)   - Relative polytypic plasmacytosis (5-10%) with morphologically unremarkable plasma cells   - Mildly increased stainable histiocytic iron stores (4+ out of 6+)     5/23/23:  LEFT ILIAC CREST BONE MARROW ASPIRATE, BONE MARROW CLOT, AND BONE MARROW CORE BIOPSY WITH:     CELLULARITY= 90-95%, MYELOID PREDOMINANCE (M/E= 7:1).   CONSISTENT WITH MYELODYSPLASTIC SYNDROME WITH EXCESS BLASTS-2 (16-17%).  SEE COMMENT.   ADEQUATE STORAGE IRON.   INCREASED NUMBER OF MEGAKARYOCYTES WITH DYSPLASTIC MORPHOLOGY.       4/26/23:  Bone marrow, left iliac crest, aspirate, clot, and core biopsy:   --Hypercellular marrow for age, 80-90% with trilineage maturation showing increased blasts and small megakaryocytic forms, most compatible with a primary marrow neoplasm, favor  myelodysplasia with excess blasts (MDS-EB-2) with impending evolution, final   classification pending correlation with cytogenetic and molecular studies, see comment   --Stainable iron is not increased   --Mild reticulin fibrosis          Bone marrow, left iliac crest, aspirate, clot, and core biopsy:   --Hypercellular marrow for age, 80-90% with trilineage maturation showing increased blasts and small megakaryocytic forms, most compatible with a primary marrow neoplasm, favor myelodysplasia with excess blasts (MDS-EB-2) with impending evolution, final   classification pending correlation with cytogenetic and molecular studies, see comment   --Stainable iron is not increased   --Mild reticulin fibrosis     Comment:  Concomitantly submitted flow cytometric analysis detects a mildly increased myeloblast population, concerning for a primary marrow process.  The blast gate is increased with approximately 8% CD38/CD34 positive blasts identified.  Populations of    B lymphocytes are polyclonal and T lymphocytes are immunophenotypically unremarkable.     This study is somewhat limited by lack of cellularity on aspirate smears with mild reticulin fibrosis noted.  However, there are increased CD34 positive blasts, counted at 12% on the immunohistochemical stain with increased megakaryocytes showing many micromegakaryocytic forms.  The morphology in conjunction with peripheral cytopenias is compatible with a primary marrow neoplasm, highly suggestive of myelodysplasia with excess blasts (MDS-EB-2) although an evolving acute leukemia is of concern.     Correlation with clinical findings and any available cytogenetic and molecular studies is required for further characterization.              Subjective:    History of Present Illness: Mr. Betsy Salinas is a 68 y.o. male who presented in April 2023 for evaluation and management of anemia.    [I do not see evidence of anemia in his labs, but he was referred for anemia  "workup.]    - he went to [place] for a dental procedure. He had several teeth removed ("an intense procedure per his wife"). He had taken antibiotics/amoxicillin and ibuprofen. He had the second part of procedure about a week later. He noted severe fatigue, weakness.  - he underwent bone marrow biopsy on 4/26/23.  - he met with Dr. Hickey on 5/8/23. He recommended repeat bone marrow biopsy.  - he underwent bone marrow biopsy on 5/23/23.  - he met with Dr. Hickey on 5/30/23. 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 CG/FISH/NGS pending. I reviewed with him and his family the aggressive nature of this disease, including natural history, prognosis, and treatment options. I recommended treatment with azacitidine 75 mg/m2 daily x7 days plus venetoclax 100mg (posa) daily x21 of 28 days. He was in agreement. Consent signed today.   Azacitidine plus venetoclax x21 of 28 days as above. Taz ramp up ordered.  Repeat bone marrow biopsy at the end of cycle 1. Orders placed.     Stem cell transplant candidate: We briefly discussed allogeneic stem cell transplantation in MDS. He will need transplant in CR1. Will plan for further discussion of stem cell transplant and meeting with transplant coordinators at next follow up in 2-3 weeks.   Will send HLA typing with next labs."    - he underwent bone marrow biopsy on 7/3/23    - he met with Dr. Hickey on 6/20/23. Information per note:  "HCT-CI of 1 (intermediate risk). Will plan to proceed with transplant in the next 2-3 months (as soon as a donor is identified). HLA typing sent."    - he saw Dr. Hickey on 7/11/23. Per his note: "will plan to proceed with transplant in the next 2-3 months (as soon as a donor is identified)."    - he underwent bone marrow biopsy on 9/6/23.  - he saw Dr. Hickey on 10/14/23.    Interval history:  - he presents for a follow-up appointment for his myelodysplastic syndrome.  - he saw Dr. Hickey on " "1/19/24. Per his note:  "His sister's typing is still pending. Given her age and timing for transplant, I believe it would be best to move forward with transplant using one of his children as a donor. We discussed pros and cons, and they are in agreement. They have one more trip to [place] planned in March, so will plan for transplant after March (tentatively April)."  - he is scheduled to start cycle #8 of azacitidine/venetoclax on 2/5/24  - today, he endorses mild fatigue, headache, nausea. He denies shortness of breath, chest pain, diarrhea, constipation.        Past medical, surgical, family, and social histories have been reviewed and updated below.    Past Medical History:   Past Medical History:   Diagnosis Date    Anticoagulant long-term use     Coronary artery disease     Hypertension     Peripheral vascular disease, unspecified        Past Surgical History:   Past Surgical History:   Procedure Laterality Date    BONE MARROW BIOPSY Left 4/26/2023    Procedure: Biopsy-bone marrow;  Surgeon: Harry Diamond MD;  Location: [facility] OR;  Service: Oncology;  Laterality: Left;    COLONOSCOPY N/A 01/05/2022    Procedure: COLONOSCOPY Golytely Vaccinated will bring cards;  Surgeon: Dereje Simon MD;  Location: [facility] ENDO;  Service: Endoscopy;  Laterality: N/A;  Do not cancel this order       Family History:   Family History   Problem Relation Age of Onset    Hypertension Mother     Cancer Father     Cancer Brother     No Known Problems Daughter     No Known Problems Daughter     No Known Problems Son        Social History:  reports that he quit smoking about 10 months ago. His smoking use included cigarettes. He started smoking about 50 years ago. He has a 12.5 pack-year smoking history. He has never used smokeless tobacco. He reports that he does not currently use alcohol. He reports that he does not use drugs.    Allergies:  Review of patient's allergies indicates:  No Known " Allergies    Medications:  Current Outpatient Medications   Medication Sig Dispense Refill    acyclovir (ZOVIRAX) 400 MG tablet Take 1 tablet (400 mg total) by mouth 2 (two) times daily. 60 tablet 11    aspirin (ECOTRIN) 81 MG EC tablet Take 1 tablet (81 mg total) by mouth once daily. 30 tablet 12    atorvastatin (LIPITOR) 80 MG tablet Take 1 tablet (80 mg total) by mouth once daily. 90 tablet 3    carvediloL (COREG) 6.25 MG tablet TAKE 1 TABLET(6.25 MG) BY MOUTH TWICE DAILY WITH MEALS 180 tablet 3    cilostazoL (PLETAL) 50 MG Tab Take 1 tablet (50 mg total) by mouth 2 (two) times daily. 180 tablet 3    cyanocobalamin (VITAMIN B-12) 1000 MCG tablet Take 2 tablets (2,000 mcg total) by mouth once daily. 180 tablet 3    docusate sodium (COLACE) 100 MG capsule Take 100 mg by mouth 2 (two) times daily as needed for Constipation.      gabapentin (NEURONTIN) 300 MG capsule TAKE 1 CAPSULE(300 MG) BY MOUTH THREE TIMES DAILY 90 capsule 11    levoFLOXacin (LEVAQUIN) 500 MG tablet Take 1 tablet (500 mg total) by mouth once daily. 30 tablet 11    promethazine (PHENERGAN) 25 MG tablet Take 1 tablet (25 mg total) by mouth every 8 (eight) hours as needed for Nausea. 40 tablet 5    venetoclax (VENCLEXTA) 100 mg Tab Take 1 tablet (100 mg) by mouth once daily on days 1-7 of a 28-day cycle. Do not discard any remaining tablets. 28 tablet 11    voriconazole (VFEND) 200 MG Tab Take 1 tablet (200 mg total) by mouth 2 (two) times daily. 60 tablet 11    zolpidem (AMBIEN) 10 mg Tab Take 1 tablet (10 mg total) by mouth nightly as needed (insomnia). 30 tablet 0     No current facility-administered medications for this visit.       Review of Systems   Constitutional:  Positive for fatigue.   HENT:  Negative for sore throat.    Eyes:  Negative for visual disturbance.   Respiratory:  Negative for shortness of breath.    Cardiovascular:  Negative for chest pain.   Gastrointestinal:  Positive for nausea. Negative for abdominal pain.    Genitourinary:  Negative for dysuria.   Musculoskeletal:  Negative for back pain.   Skin:  Negative for rash.   Neurological:  Positive for headaches. Negative for weakness.   Hematological:  Negative for adenopathy.   Psychiatric/Behavioral:  The patient is not nervous/anxious.        ECOG Performance Status:   ECOG SCORE    1 - Restricted in strenuous activity-ambulatory and able to carry out work of a light nature         Objective:      Vitals:   Vitals:    02/02/24 1042   BP: 116/69   Pulse: 94   SpO2: 96%   Weight: 76.6 kg (168 lb 14 oz)     BMI: Body mass index is 24.94 kg/m².    Physical Exam  Vitals and nursing note reviewed.   Constitutional:       Appearance: He is well-developed.   HENT:      Head: Normocephalic and atraumatic.   Eyes:      Pupils: Pupils are equal, round, and reactive to light.   Cardiovascular:      Rate and Rhythm: Normal rate and regular rhythm.   Pulmonary:      Effort: Pulmonary effort is normal.      Breath sounds: Normal breath sounds.   Abdominal:      General: Bowel sounds are normal.      Palpations: Abdomen is soft.   Musculoskeletal:         General: Normal range of motion.      Cervical back: Normal range of motion and neck supple.   Skin:     General: Skin is warm and dry.   Neurological:      Mental Status: He is alert and oriented to person, place, and time.   Psychiatric:         Behavior: Behavior normal.         Thought Content: Thought content normal.         Judgment: Judgment normal.         Laboratory Data:  Labs have been reviewed.    Lab Results   Component Value Date    WBC 1.98 (LL) 02/02/2024    HGB 14.6 02/02/2024    HCT 43.4 02/02/2024     (H) 02/02/2024     (L) 02/02/2024       Imaging:        Assessment:       1. Myelodysplastic syndrome    2. Pancytopenia    3. Chemotherapy-induced neutropenia    4. 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 Anemia due to antineoplastic chemotherapy    7.  "Chemotherapy-induced nausea and vomiting    8. Aortic atherosclerosis    9. Secondary thrombocytopenia           Plan:     Myelodysplastic syndrome / neutropenia due to chemo.  - bone marrow biopsy (4/26/23) revealed myelodysplastic syndrome.  - he met with Dr. Hickey on 5/8/23. He recommended repeat bone marrow biopsy.  - bone marrow biopsy (5/23/23) revealed myelodysplastic syndrome with excess blasts.  - he met with Dr. Hickey on 5/30/23. He recommended azacitidine (7-day) with venetoclax.  - he has received several blood transfusions in June 2023  - he met with Dr. Hickey on 6/20/23. Information per note:  "HCT-CI of 1 (intermediate risk). Will plan to proceed with transplant in the next 2-3 months (as soon as a donor is identified). HLA typing sent."  - bone marrow biopsy (7/3/23) revealed no increased blasts! He will follow up with Dr. Hickey on 7/11/23  - he has received blood/platelets on 7/5/23.  - he has not received blood/platelet transfusion in several weeks.  - he saw Dr. Hickey on 8/15/23. Per his note:  " Will plan to proceed with transplant ASAP (as soon as a donor is identified). HLA typing sent."  - he underwent bone marrow biopsy on 9/6/23.   - change vidaza to 5 days, and venetoclax from 14 days to 7 days every 28 days (due to cytopenias).  - plan is still for transplant in April 2024 tentatively.  - Labs have been reviewed. Absolute neutrophil count is 0.34 k/uL. Hemoglobin is 14.6 g/dL. Platelet count is 135 k/uL.  - since ANC is < 500 cells/uL, we will repeat CBC on 2/5/24 to ensure it's above 500 cells/uL before proceeding with cycle #8 of azacitidine/venetoclax, starting on 2/5/24   - continue anti-infectious therapy per Dr. Hickey's plan.  - return to clinic in 4 weeks in preparation for cycle #9 of azacitidine/venetoclax.    2. Chemotherapy-induced nausea/vomiting  - mild symptoms. Continue phenergan as needed.    3. Atherosclerosis of aorta  - seen on imaging  - continue aspirin, " atorvastatin    4. Advance Care Planning     Power of   After our discussion (at previous visit), the patient has decided not to complete a HCPOA.       - return to clinic in 4 weeks in preparation for cycle #9 of azacitidine/venetoclax.    Harry Diamond M.D.  Hematology/Oncology  [facility] - [address], [address]  [place] [address]  Phone: [phone]  Fax: [phone]

## 2024-02-02 ENCOUNTER — LAB VISIT (OUTPATIENT)
Dept: LAB | Facility: HOSPITAL | Age: 69
End: 2024-02-02
Attending: INTERNAL MEDICINE
Payer: MEDICARE

## 2024-02-02 ENCOUNTER — OFFICE VISIT (OUTPATIENT)
Dept: HEMATOLOGY/ONCOLOGY | Facility: CLINIC | Age: 69
End: 2024-02-02
Payer: MEDICARE

## 2024-02-02 ENCOUNTER — DOCUMENTATION ONLY (OUTPATIENT)
Dept: HEMATOLOGY/ONCOLOGY | Facility: CLINIC | Age: 69
End: 2024-02-02
Payer: MEDICARE

## 2024-02-02 ENCOUNTER — TELEPHONE (OUTPATIENT)
Dept: FAMILY MEDICINE | Facility: HOSPITAL | Age: 69
End: 2024-02-02
Payer: MEDICARE

## 2024-02-02 VITALS
OXYGEN SATURATION: 96 % | WEIGHT: 168.88 LBS | DIASTOLIC BLOOD PRESSURE: 69 MMHG | BODY MASS INDEX: 24.94 KG/M2 | SYSTOLIC BLOOD PRESSURE: 116 MMHG | HEART RATE: 94 BPM

## 2024-02-02 DIAGNOSIS — E53.8 B12 DEFICIENCY: ICD-10-CM

## 2024-02-02 DIAGNOSIS — T45.1X5A ANEMIA DUE TO ANTINEOPLASTIC CHEMOTHERAPY: ICD-10-CM

## 2024-02-02 DIAGNOSIS — I70.0 AORTIC ATHEROSCLEROSIS: ICD-10-CM

## 2024-02-02 DIAGNOSIS — D46.9 MYELODYSPLASTIC SYNDROME: Primary | ICD-10-CM

## 2024-02-02 DIAGNOSIS — T45.1X5A CHEMOTHERAPY-INDUCED NAUSEA AND VOMITING: ICD-10-CM

## 2024-02-02 DIAGNOSIS — Z79.899 IMMUNODEFICIENCY DUE TO DRUG THERAPY: ICD-10-CM

## 2024-02-02 DIAGNOSIS — D84.81 IMMUNODEFICIENCY SECONDARY TO NEOPLASM: ICD-10-CM

## 2024-02-02 DIAGNOSIS — Z76.82 STEM CELL TRANSPLANT CANDIDATE: ICD-10-CM

## 2024-02-02 DIAGNOSIS — T45.1X5A CHEMOTHERAPY-INDUCED NEUTROPENIA: ICD-10-CM

## 2024-02-02 DIAGNOSIS — D49.9 IMMUNODEFICIENCY SECONDARY TO NEOPLASM: ICD-10-CM

## 2024-02-02 DIAGNOSIS — E53.8 B12 DEFICIENCY: Primary | ICD-10-CM

## 2024-02-02 DIAGNOSIS — R11.2 CHEMOTHERAPY-INDUCED NAUSEA AND VOMITING: ICD-10-CM

## 2024-02-02 DIAGNOSIS — D84.821 IMMUNODEFICIENCY DUE TO DRUG THERAPY: ICD-10-CM

## 2024-02-02 DIAGNOSIS — D61.818 PANCYTOPENIA: ICD-10-CM

## 2024-02-02 DIAGNOSIS — D46.9 MYELODYSPLASTIC SYNDROME: ICD-10-CM

## 2024-02-02 DIAGNOSIS — D70.1 CHEMOTHERAPY-INDUCED NEUTROPENIA: ICD-10-CM

## 2024-02-02 DIAGNOSIS — D69.59 SECONDARY THROMBOCYTOPENIA: ICD-10-CM

## 2024-02-02 DIAGNOSIS — D64.81 ANEMIA DUE TO ANTINEOPLASTIC CHEMOTHERAPY: ICD-10-CM

## 2024-02-02 LAB
ALBUMIN SERPL BCP-MCNC: 3.8 G/DL (ref 3.5–5.2)
ALP SERPL-CCNC: 76 U/L (ref 55–135)
ALT SERPL W/O P-5'-P-CCNC: 26 U/L (ref 10–44)
ANION GAP SERPL CALC-SCNC: 9 MMOL/L (ref 8–16)
ANISOCYTOSIS BLD QL SMEAR: SLIGHT
AST SERPL-CCNC: 17 U/L (ref 10–40)
BASOPHILS # BLD AUTO: ABNORMAL K/UL (ref 0–0.2)
BASOPHILS NFR BLD: 3 % (ref 0–1.9)
BILIRUB SERPL-MCNC: 0.3 MG/DL (ref 0.1–1)
BUN SERPL-MCNC: 16 MG/DL (ref 8–23)
CALCIUM SERPL-MCNC: 9.8 MG/DL (ref 8.7–10.5)
CHLORIDE SERPL-SCNC: 105 MMOL/L (ref 95–110)
CO2 SERPL-SCNC: 26 MMOL/L (ref 23–29)
COMPLEXED PSA SERPL-MCNC: 4.1 NG/ML (ref 0–4)
CREAT SERPL-MCNC: 1.2 MG/DL (ref 0.5–1.4)
DIFFERENTIAL METHOD BLD: ABNORMAL
EOSINOPHIL # BLD AUTO: ABNORMAL K/UL (ref 0–0.5)
EOSINOPHIL NFR BLD: 0 % (ref 0–8)
ERYTHROCYTE [DISTWIDTH] IN BLOOD BY AUTOMATED COUNT: 19.5 % (ref 11.5–14.5)
EST. GFR  (NO RACE VARIABLE): >60 ML/MIN/1.73 M^2
GIANT PLATELETS BLD QL SMEAR: PRESENT
GLUCOSE SERPL-MCNC: 169 MG/DL (ref 70–110)
HCT VFR BLD AUTO: 43.4 % (ref 40–54)
HGB BLD-MCNC: 14.6 G/DL (ref 14–18)
IMM GRANULOCYTES # BLD AUTO: ABNORMAL K/UL (ref 0–0.04)
IMM GRANULOCYTES NFR BLD AUTO: ABNORMAL % (ref 0–0.5)
LDH SERPL L TO P-CCNC: 119 U/L (ref 110–260)
LYMPHOCYTES # BLD AUTO: ABNORMAL K/UL (ref 1–4.8)
LYMPHOCYTES NFR BLD: 77 % (ref 18–48)
MCH RBC QN AUTO: 36.2 PG (ref 27–31)
MCHC RBC AUTO-ENTMCNC: 33.6 G/DL (ref 32–36)
MCV RBC AUTO: 108 FL (ref 82–98)
MONOCYTES # BLD AUTO: ABNORMAL K/UL (ref 0.3–1)
MONOCYTES NFR BLD: 3 % (ref 4–15)
NEUTROPHILS NFR BLD: 14 % (ref 38–73)
NEUTS BAND NFR BLD MANUAL: 3 %
NRBC BLD-RTO: 0 /100 WBC
OVALOCYTES BLD QL SMEAR: ABNORMAL
PLATELET # BLD AUTO: 135 K/UL (ref 150–450)
PLATELET BLD QL SMEAR: ABNORMAL
PMV BLD AUTO: 9.9 FL (ref 9.2–12.9)
POIKILOCYTOSIS BLD QL SMEAR: SLIGHT
POTASSIUM SERPL-SCNC: 4.9 MMOL/L (ref 3.5–5.1)
PROT SERPL-MCNC: 7.2 G/DL (ref 6–8.4)
RBC # BLD AUTO: 4.03 M/UL (ref 4.6–6.2)
SODIUM SERPL-SCNC: 140 MMOL/L (ref 136–145)
URATE SERPL-MCNC: 6 MG/DL (ref 3.4–7)
VIT B12 SERPL-MCNC: <148 PG/ML (ref 210–950)
WBC # BLD AUTO: 1.98 K/UL (ref 3.9–12.7)

## 2024-02-02 PROCEDURE — 99215 OFFICE O/P EST HI 40 MIN: CPT | Mod: S$GLB,,, | Performed by: INTERNAL MEDICINE

## 2024-02-02 PROCEDURE — 84550 ASSAY OF BLOOD/URIC ACID: CPT | Performed by: INTERNAL MEDICINE

## 2024-02-02 PROCEDURE — 80053 COMPREHEN METABOLIC PANEL: CPT | Performed by: INTERNAL MEDICINE

## 2024-02-02 PROCEDURE — 85027 COMPLETE CBC AUTOMATED: CPT | Performed by: INTERNAL MEDICINE

## 2024-02-02 PROCEDURE — 82607 VITAMIN B-12: CPT | Performed by: INTERNAL MEDICINE

## 2024-02-02 PROCEDURE — 85007 BL SMEAR W/DIFF WBC COUNT: CPT | Performed by: INTERNAL MEDICINE

## 2024-02-02 PROCEDURE — 84153 ASSAY OF PSA TOTAL: CPT | Performed by: INTERNAL MEDICINE

## 2024-02-02 PROCEDURE — 99999 PR PBB SHADOW E&M-EST. PATIENT-LVL III: CPT | Mod: PBBFAC,,, | Performed by: INTERNAL MEDICINE

## 2024-02-02 PROCEDURE — 83615 LACTATE (LD) (LDH) ENZYME: CPT | Performed by: INTERNAL MEDICINE

## 2024-02-02 PROCEDURE — 36415 COLL VENOUS BLD VENIPUNCTURE: CPT | Performed by: INTERNAL MEDICINE

## 2024-02-02 RX ORDER — AZACITIDINE 100 MG/1
75 INJECTION, POWDER, LYOPHILIZED, FOR SOLUTION INTRAVENOUS; SUBCUTANEOUS
Status: CANCELLED | OUTPATIENT
Start: 2024-03-08

## 2024-02-02 RX ORDER — ONDANSETRON HYDROCHLORIDE 8 MG/1
16 TABLET, FILM COATED ORAL
Status: CANCELLED | OUTPATIENT
Start: 2024-03-08

## 2024-02-02 RX ORDER — ONDANSETRON HYDROCHLORIDE 8 MG/1
16 TABLET, FILM COATED ORAL
Status: CANCELLED | OUTPATIENT
Start: 2024-03-05

## 2024-02-02 RX ORDER — AZACITIDINE 100 MG/1
75 INJECTION, POWDER, LYOPHILIZED, FOR SOLUTION INTRAVENOUS; SUBCUTANEOUS
Status: CANCELLED | OUTPATIENT
Start: 2024-03-07

## 2024-02-02 RX ORDER — CYANOCOBALAMIN 1000 UG/ML
1000 INJECTION, SOLUTION INTRAMUSCULAR; SUBCUTANEOUS
Status: CANCELLED | OUTPATIENT
Start: 2024-02-05

## 2024-02-02 RX ORDER — ONDANSETRON HYDROCHLORIDE 8 MG/1
16 TABLET, FILM COATED ORAL
Status: CANCELLED | OUTPATIENT
Start: 2024-03-06

## 2024-02-02 RX ORDER — AZACITIDINE 100 MG/1
75 INJECTION, POWDER, LYOPHILIZED, FOR SOLUTION INTRAVENOUS; SUBCUTANEOUS
Status: CANCELLED | OUTPATIENT
Start: 2024-03-05

## 2024-02-02 RX ORDER — ONDANSETRON HYDROCHLORIDE 8 MG/1
16 TABLET, FILM COATED ORAL
Status: CANCELLED | OUTPATIENT
Start: 2024-03-04

## 2024-02-02 RX ORDER — AZACITIDINE 100 MG/1
75 INJECTION, POWDER, LYOPHILIZED, FOR SOLUTION INTRAVENOUS; SUBCUTANEOUS
Status: CANCELLED | OUTPATIENT
Start: 2024-03-04

## 2024-02-02 RX ORDER — ONDANSETRON HYDROCHLORIDE 8 MG/1
16 TABLET, FILM COATED ORAL
Status: CANCELLED | OUTPATIENT
Start: 2024-03-07

## 2024-02-02 RX ORDER — AZACITIDINE 100 MG/1
75 INJECTION, POWDER, LYOPHILIZED, FOR SOLUTION INTRAVENOUS; SUBCUTANEOUS
Status: CANCELLED | OUTPATIENT
Start: 2024-03-06

## 2024-02-02 NOTE — TELEPHONE ENCOUNTER
Lucila from [facility] called with a Critical lab value of WBC-1.98. High Priority message sent to Dr. Harry Diamond and staff. Value entered into Flowsheets.

## 2024-02-05 ENCOUNTER — DOCUMENTATION ONLY (OUTPATIENT)
Dept: HEMATOLOGY/ONCOLOGY | Facility: CLINIC | Age: 69
End: 2024-02-05
Payer: MEDICARE

## 2024-02-05 ENCOUNTER — INFUSION (OUTPATIENT)
Dept: INFUSION THERAPY | Facility: HOSPITAL | Age: 69
End: 2024-02-05
Attending: INTERNAL MEDICINE
Payer: MEDICARE

## 2024-02-05 ENCOUNTER — TELEPHONE (OUTPATIENT)
Dept: INFUSION THERAPY | Facility: HOSPITAL | Age: 69
End: 2024-02-05
Payer: MEDICARE

## 2024-02-05 VITALS
DIASTOLIC BLOOD PRESSURE: 72 MMHG | RESPIRATION RATE: 18 BRPM | SYSTOLIC BLOOD PRESSURE: 116 MMHG | OXYGEN SATURATION: 98 % | HEART RATE: 95 BPM | TEMPERATURE: 98 F

## 2024-02-05 DIAGNOSIS — E53.8 B12 DEFICIENCY: Primary | ICD-10-CM

## 2024-02-05 PROCEDURE — 96372 THER/PROPH/DIAG INJ SC/IM: CPT

## 2024-02-05 PROCEDURE — 63600175 PHARM REV CODE 636 W HCPCS: Performed by: INTERNAL MEDICINE

## 2024-02-05 RX ORDER — CYANOCOBALAMIN 1000 UG/ML
1000 INJECTION, SOLUTION INTRAMUSCULAR; SUBCUTANEOUS
OUTPATIENT
Start: 2024-02-05

## 2024-02-05 RX ORDER — CYANOCOBALAMIN 1000 UG/ML
1000 INJECTION, SOLUTION INTRAMUSCULAR; SUBCUTANEOUS
Status: COMPLETED | OUTPATIENT
Start: 2024-02-05 | End: 2024-02-05

## 2024-02-05 RX ADMIN — CYANOCOBALAMIN 1000 MCG: 1000 INJECTION, SOLUTION INTRAMUSCULAR at 12:02

## 2024-02-05 NOTE — PLAN OF CARE
Pt tolerated B12 injection well, no complaints at this time. No adverse reactions noted. Next appointment scheduled and pt d/c in no acute distress.

## 2024-02-05 NOTE — TELEPHONE ENCOUNTER
Called and spoke with pt and spouse. Informed per Dr. Diamond, Vidaza injections canceled for this week, proceeding with b12 injection today. Pt and spouse verbalized understanding.

## 2024-02-17 DIAGNOSIS — G47.00 INSOMNIA, UNSPECIFIED TYPE: ICD-10-CM

## 2024-02-19 RX ORDER — ZOLPIDEM TARTRATE 10 MG/1
10 TABLET ORAL NIGHTLY PRN
Qty: 30 TABLET | Refills: 0 | Status: SHIPPED | OUTPATIENT
Start: 2024-02-19 | End: 2024-03-18 | Stop reason: SDUPTHER

## 2024-02-25 NOTE — PROGRESS NOTES
PATIENT: Guillaume Salinas  MRN: [id number]  DATE: 3/1/2024    Diagnosis:   1. Myelodysplastic syndrome    2. Pancytopenia    3. Chemotherapy-induced neutropenia    4. Secondary thrombocytopenia    5. 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 Chemotherapy-induced nausea and vomiting    8. Aortic atherosclerosis      Chief Complaint: myelodysplastic syndrome    Oncologic History:      Oncologic History Myelodysplastic syndrome      Oncologic Treatment Azacitidine/venetoclax (start date 6/12/23).      Pathology 7/3/23:  BONE MARROW ASPIRATE, TOUCH PREP, CLOT, AND DECALCIFIED NEEDLE CORE BIOPSY:   LEFT POSTEROSUPERIOR ILIAC CREST   - MORPHOLOGIC AND IMMUNOPHENOTYPIC FEATURES OF PERSISTENT MDS   - LIMITED, SUBOPTIMAL SPECIMEN: Findings may not be entirely representative   - Hypocellular marrow (20% total cellularity) with no increased CD34+ blasts, chemotherapeutic changes, and scant residual trilineage hypoplasia and dyspoiesis with increased numbers of micromegakaryocytes   - Relative lymphocytosis including small, well-defined lymphoid aggregates (favor benign/reactive)   - Relative polytypic plasmacytosis (5-10%) with morphologically unremarkable plasma cells   - Mildly increased stainable histiocytic iron stores (4+ out of 6+)     5/23/23:  LEFT ILIAC CREST BONE MARROW ASPIRATE, BONE MARROW CLOT, AND BONE MARROW CORE BIOPSY WITH:     CELLULARITY= 90-95%, MYELOID PREDOMINANCE (M/E= 7:1).   CONSISTENT WITH MYELODYSPLASTIC SYNDROME WITH EXCESS BLASTS-2 (16-17%).  SEE COMMENT.   ADEQUATE STORAGE IRON.   INCREASED NUMBER OF MEGAKARYOCYTES WITH DYSPLASTIC MORPHOLOGY.       4/26/23:  Bone marrow, left iliac crest, aspirate, clot, and core biopsy:   --Hypercellular marrow for age, 80-90% with trilineage maturation showing increased blasts and small megakaryocytic forms, most compatible with a primary marrow neoplasm, favor myelodysplasia with excess blasts (MDS-EB-2) with  impending evolution, final   classification pending correlation with cytogenetic and molecular studies, see comment   --Stainable iron is not increased   --Mild reticulin fibrosis          Bone marrow, left iliac crest, aspirate, clot, and core biopsy:   --Hypercellular marrow for age, 80-90% with trilineage maturation showing increased blasts and small megakaryocytic forms, most compatible with a primary marrow neoplasm, favor myelodysplasia with excess blasts (MDS-EB-2) with impending evolution, final   classification pending correlation with cytogenetic and molecular studies, see comment   --Stainable iron is not increased   --Mild reticulin fibrosis     Comment:  Concomitantly submitted flow cytometric analysis detects a mildly increased myeloblast population, concerning for a primary marrow process.  The blast gate is increased with approximately 8% CD38/CD34 positive blasts identified.  Populations of    B lymphocytes are polyclonal and T lymphocytes are immunophenotypically unremarkable.     This study is somewhat limited by lack of cellularity on aspirate smears with mild reticulin fibrosis noted.  However, there are increased CD34 positive blasts, counted at 12% on the immunohistochemical stain with increased megakaryocytes showing many micromegakaryocytic forms.  The morphology in conjunction with peripheral cytopenias is compatible with a primary marrow neoplasm, highly suggestive of myelodysplasia with excess blasts (MDS-EB-2) although an evolving acute leukemia is of concern.     Correlation with clinical findings and any available cytogenetic and molecular studies is required for further characterization.              Subjective:    History of Present Illness: Mr. Betsy Salinas is a 68 y.o. male who presented in April 2023 for evaluation and management of anemia.    [I do not see evidence of anemia in his labs, but he was referred for anemia workup.]    - he went to [place] for a dental  "procedure. He had several teeth removed ("an intense procedure per his wife"). He had taken antibiotics/amoxicillin and ibuprofen. He had the second part of procedure about a week later. He noted severe fatigue, weakness.  - he underwent bone marrow biopsy on 4/26/23.  - he met with Dr. Hickey on 5/8/23. He recommended repeat bone marrow biopsy.  - he underwent bone marrow biopsy on 5/23/23.  - he met with Dr. Hickey on 5/30/23. 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 CG/FISH/NGS pending. I reviewed with him and his family the aggressive nature of this disease, including natural history, prognosis, and treatment options. I recommended treatment with azacitidine 75 mg/m2 daily x7 days plus venetoclax 100mg (posa) daily x21 of 28 days. He was in agreement. Consent signed today.   Azacitidine plus venetoclax x21 of 28 days as above. Taz ramp up ordered.  Repeat bone marrow biopsy at the end of cycle 1. Orders placed.     Stem cell transplant candidate: We briefly discussed allogeneic stem cell transplantation in MDS. He will need transplant in CR1. Will plan for further discussion of stem cell transplant and meeting with transplant coordinators at next follow up in 2-3 weeks.   Will send HLA typing with next labs."    - he underwent bone marrow biopsy on 7/3/23    - he met with Dr. Hickey on 6/20/23. Information per note:  "HCT-CI of 1 (intermediate risk). Will plan to proceed with transplant in the next 2-3 months (as soon as a donor is identified). HLA typing sent."    - he saw Dr. Hickey on 7/11/23. Per his note: "will plan to proceed with transplant in the next 2-3 months (as soon as a donor is identified)."    - he underwent bone marrow biopsy on 9/6/23.  - he saw Dr. Hickey on 10/14/23.    - he saw Dr. Hickey on 1/19/24. Per his note:  "His sister's typing is still pending. Given her age and timing for transplant, I believe it would be best to move forward " "with transplant using one of his children as a donor. We discussed pros and cons, and they are in agreement. They have one more trip to [place] planned in March, so will plan for transplant after March (tentatively April)."      Interval history:  - he presents for a follow-up appointment for his myelodysplastic syndrome.  - he is scheduled to start cycle #8 of azacitidine/venetoclax on 3/4/24. It was delayed due to abnormal labs.  - he is scheduled to see Dr. Hickey on 3/4/24.  - today, he is doing well. He endorses fatigue. He denies shortness of breath, chest pain, diarrhea, constipation.        Past medical, surgical, family, and social histories have been reviewed and updated below.    Past Medical History:   Past Medical History:   Diagnosis Date    Anticoagulant long-term use     Coronary artery disease     Hypertension     Peripheral vascular disease, unspecified        Past Surgical History:   Past Surgical History:   Procedure Laterality Date    BONE MARROW BIOPSY Left 4/26/2023    Procedure: Biopsy-bone marrow;  Surgeon: Harry Diamond MD;  Location[facility] OR;  Service: Oncology;  Laterality: Left;    COLONOSCOPY N/A 01/05/2022    Procedure: COLONOSCOPY Golytely Vaccinated will bring cards;  Surgeon: Dereje Simon MD;  Location: [facility] ENDO;  Service: Endoscopy;  Laterality: N/A;  Do not cancel this order       Family History:   Family History   Problem Relation Age of Onset    Hypertension Mother     Cancer Father     Cancer Brother     No Known Problems Daughter     No Known Problems Daughter     No Known Problems Son        Social History:  reports that he quit smoking about a year ago. His smoking use included cigarettes. He started smoking about 51 years ago. He has a 12.5 pack-year smoking history. He has never used smokeless tobacco. He reports that he does not currently use alcohol. He reports that he does not use drugs.    Allergies:  Review of patient's allergies indicates:  No Known " Allergies    Medications:  Current Outpatient Medications   Medication Sig Dispense Refill    acyclovir (ZOVIRAX) 400 MG tablet Take 1 tablet (400 mg total) by mouth 2 (two) times daily. 60 tablet 11    aspirin (ECOTRIN) 81 MG EC tablet Take 1 tablet (81 mg total) by mouth once daily. 30 tablet 12    atorvastatin (LIPITOR) 80 MG tablet Take 1 tablet (80 mg total) by mouth once daily. 90 tablet 3    carvediloL (COREG) 6.25 MG tablet TAKE 1 TABLET(6.25 MG) BY MOUTH TWICE DAILY WITH MEALS 180 tablet 3    cilostazoL (PLETAL) 50 MG Tab Take 1 tablet (50 mg total) by mouth 2 (two) times daily. 180 tablet 3    cyanocobalamin (VITAMIN B-12) 1000 MCG tablet Take 2 tablets (2,000 mcg total) by mouth once daily. 180 tablet 3    docusate sodium (COLACE) 100 MG capsule Take 100 mg by mouth 2 (two) times daily as needed for Constipation.      gabapentin (NEURONTIN) 300 MG capsule TAKE 1 CAPSULE(300 MG) BY MOUTH THREE TIMES DAILY 90 capsule 11    levoFLOXacin (LEVAQUIN) 500 MG tablet Take 1 tablet (500 mg total) by mouth once daily. 30 tablet 11    promethazine (PHENERGAN) 25 MG tablet Take 1 tablet (25 mg total) by mouth every 8 (eight) hours as needed for Nausea. 40 tablet 5    venetoclax (VENCLEXTA) 100 mg Tab Take 1 tablet (100 mg) by mouth once daily on days 1-7 of a 28-day cycle. Do not discard any remaining tablets. 28 tablet 11    voriconazole (VFEND) 200 MG Tab Take 1 tablet (200 mg total) by mouth 2 (two) times daily. 60 tablet 11    zolpidem (AMBIEN) 10 mg Tab TAKE 1 TABLET BY MOUTH EVERY NIGHT AT BEDTIME AS NEEDED 30 tablet 0     No current facility-administered medications for this visit.       Review of Systems   Constitutional:  Positive for fatigue.   HENT:  Negative for sore throat.    Eyes:  Negative for visual disturbance.   Respiratory:  Negative for shortness of breath.    Cardiovascular:  Negative for chest pain.   Gastrointestinal:  Positive for nausea. Negative for abdominal pain.   Genitourinary:  Negative  for dysuria.   Musculoskeletal:  Negative for back pain.   Skin:  Negative for rash.   Neurological:  Positive for headaches. Negative for weakness.   Hematological:  Negative for adenopathy.   Psychiatric/Behavioral:  The patient is not nervous/anxious.        ECOG Performance Status:   ECOG SCORE    1 - Restricted in strenuous activity-ambulatory and able to carry out work of a light nature         Objective:      Vitals:   Vitals:    03/01/24 1046   BP: 137/77   BP Location: Right arm   Patient Position: Sitting   BP Method: Medium (Automatic)   Pulse: 65   Resp: 16   SpO2: 97%   Weight: 77.7 kg (171 lb 4.8 oz)     BMI: Body mass index is 25.3 kg/m².    Physical Exam  Vitals and nursing note reviewed.   Constitutional:       Appearance: He is well-developed.   HENT:      Head: Normocephalic and atraumatic.   Eyes:      Pupils: Pupils are equal, round, and reactive to light.   Cardiovascular:      Rate and Rhythm: Normal rate and regular rhythm.   Pulmonary:      Effort: Pulmonary effort is normal.      Breath sounds: Normal breath sounds.   Abdominal:      General: Bowel sounds are normal.      Palpations: Abdomen is soft.   Musculoskeletal:         General: Normal range of motion.      Cervical back: Normal range of motion and neck supple.   Skin:     General: Skin is warm and dry.   Neurological:      Mental Status: He is alert and oriented to person, place, and time.   Psychiatric:         Behavior: Behavior normal.         Thought Content: Thought content normal.         Judgment: Judgment normal.         Laboratory Data:  Labs have been reviewed.    Lab Results   Component Value Date    WBC 1.73 (LL) 02/05/2024    WBC 1.73 (LL) 02/05/2024    HGB 13.2 (L) 02/05/2024    HGB 13.2 (L) 02/05/2024    HCT 40.0 02/05/2024    HCT 40.0 02/05/2024     (H) 02/05/2024     (H) 02/05/2024     02/05/2024     02/05/2024       Imaging:        Assessment:       1. Myelodysplastic syndrome    2.  "Pancytopenia    3. Chemotherapy-induced neutropenia    4. Secondary thrombocytopenia    5. 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 Chemotherapy-induced nausea and vomiting    8. Aortic atherosclerosis           Plan:     Myelodysplastic syndrome / neutropenia due to chemo.  - bone marrow biopsy (4/26/23) revealed myelodysplastic syndrome.  - he met with Dr. Hickey on 5/8/23. He recommended repeat bone marrow biopsy.  - bone marrow biopsy (5/23/23) revealed myelodysplastic syndrome with excess blasts.  - he met with Dr. Hickey on 5/30/23. He recommended azacitidine (7-day) with venetoclax.  - he has received several blood transfusions in June 2023  - he met with Dr. Hickey on 6/20/23. Information per note:  "HCT-CI of 1 (intermediate risk). Will plan to proceed with transplant in the next 2-3 months (as soon as a donor is identified). HLA typing sent."  - bone marrow biopsy (7/3/23) revealed no increased blasts! He will follow up with Dr. Hickey on 7/11/23  - he has received blood/platelets on 7/5/23.  - he has not received blood/platelet transfusion in several weeks.  - he saw Dr. Hickey on 8/15/23. Per his note:  " Will plan to proceed with transplant ASAP (as soon as a donor is identified). HLA typing sent."  - he underwent bone marrow biopsy on 9/6/23.   - change vidaza to 5 days, and venetoclax from 14 days to 7 days every 28 days (due to cytopenias).  - plan is still for transplant in April 2024 tentatively.  - cycle #8 was postponed due to cytopenias.  - follow up labs from today. Once reviewed, I will sign orders for him to proceed with cycle #8 of azacitidine/venetoclax  - he is scheduled to see Dr. Hickey on 3/4/24.  - continue anti-infectious therapy per Dr. Hickey's plan.  - return to clinic in 4 weeks in preparation for cycle #9 of azacitidine/venetoclax.    2. Chemotherapy-induced nausea/vomiting  - mild symptoms. Continue phenergan as needed.    3. " Atherosclerosis of aorta  - seen on imaging  - continue aspirin, atorvastatin    4. Advance Care Planning     Power of   After our discussion (at previous visit), the patient has decided not to complete a HCPOA.       - return to clinic in 4 weeks in preparation for cycle #9 of azacitidine/venetoclax.    Harry Diamond M.D.  Hematology/Oncology  [facility] - [address], [address]  [place] [address]  Phone: [phone]  Fax: [phone]

## 2024-02-29 DIAGNOSIS — I25.10 CORONARY ARTERY DISEASE INVOLVING NATIVE CORONARY ARTERY OF NATIVE HEART WITHOUT ANGINA PECTORIS: ICD-10-CM

## 2024-02-29 DIAGNOSIS — I10 PRIMARY HYPERTENSION: ICD-10-CM

## 2024-03-01 ENCOUNTER — LAB VISIT (OUTPATIENT)
Dept: LAB | Facility: HOSPITAL | Age: 69
End: 2024-03-01
Attending: INTERNAL MEDICINE
Payer: MEDICARE

## 2024-03-01 ENCOUNTER — OFFICE VISIT (OUTPATIENT)
Dept: HEMATOLOGY/ONCOLOGY | Facility: CLINIC | Age: 69
End: 2024-03-01
Payer: MEDICARE

## 2024-03-01 VITALS
SYSTOLIC BLOOD PRESSURE: 137 MMHG | DIASTOLIC BLOOD PRESSURE: 77 MMHG | WEIGHT: 171.31 LBS | RESPIRATION RATE: 16 BRPM | BODY MASS INDEX: 25.3 KG/M2 | OXYGEN SATURATION: 97 % | HEART RATE: 65 BPM

## 2024-03-01 DIAGNOSIS — D84.81 IMMUNODEFICIENCY SECONDARY TO NEOPLASM: ICD-10-CM

## 2024-03-01 DIAGNOSIS — D70.1 CHEMOTHERAPY-INDUCED NEUTROPENIA: ICD-10-CM

## 2024-03-01 DIAGNOSIS — D69.59 SECONDARY THROMBOCYTOPENIA: ICD-10-CM

## 2024-03-01 DIAGNOSIS — Z79.899 IMMUNODEFICIENCY DUE TO DRUG THERAPY: ICD-10-CM

## 2024-03-01 DIAGNOSIS — R11.2 CHEMOTHERAPY-INDUCED NAUSEA AND VOMITING: ICD-10-CM

## 2024-03-01 DIAGNOSIS — D49.9 IMMUNODEFICIENCY SECONDARY TO NEOPLASM: ICD-10-CM

## 2024-03-01 DIAGNOSIS — D61.818 PANCYTOPENIA: ICD-10-CM

## 2024-03-01 DIAGNOSIS — I25.10 CORONARY ARTERY DISEASE INVOLVING NATIVE CORONARY ARTERY OF NATIVE HEART WITHOUT ANGINA PECTORIS: ICD-10-CM

## 2024-03-01 DIAGNOSIS — T45.1X5A CHEMOTHERAPY-INDUCED NAUSEA AND VOMITING: ICD-10-CM

## 2024-03-01 DIAGNOSIS — D46.9 MYELODYSPLASTIC SYNDROME: Primary | ICD-10-CM

## 2024-03-01 DIAGNOSIS — D46.9 MYELODYSPLASTIC SYNDROME: ICD-10-CM

## 2024-03-01 DIAGNOSIS — T45.1X5A CHEMOTHERAPY-INDUCED NEUTROPENIA: ICD-10-CM

## 2024-03-01 DIAGNOSIS — D84.821 IMMUNODEFICIENCY DUE TO DRUG THERAPY: ICD-10-CM

## 2024-03-01 DIAGNOSIS — I10 PRIMARY HYPERTENSION: ICD-10-CM

## 2024-03-01 DIAGNOSIS — I70.0 AORTIC ATHEROSCLEROSIS: ICD-10-CM

## 2024-03-01 LAB
ALBUMIN SERPL BCP-MCNC: 4.1 G/DL (ref 3.5–5.2)
ALP SERPL-CCNC: 76 U/L (ref 55–135)
ALT SERPL W/O P-5'-P-CCNC: 20 U/L (ref 10–44)
ANION GAP SERPL CALC-SCNC: 12 MMOL/L (ref 8–16)
AST SERPL-CCNC: 17 U/L (ref 10–40)
BASOPHILS # BLD AUTO: 0.01 K/UL (ref 0–0.2)
BASOPHILS NFR BLD: 0.3 % (ref 0–1.9)
BILIRUB SERPL-MCNC: 0.4 MG/DL (ref 0.1–1)
BUN SERPL-MCNC: 13 MG/DL (ref 8–23)
CALCIUM SERPL-MCNC: 9.6 MG/DL (ref 8.7–10.5)
CHLORIDE SERPL-SCNC: 103 MMOL/L (ref 95–110)
CO2 SERPL-SCNC: 26 MMOL/L (ref 23–29)
CREAT SERPL-MCNC: 1 MG/DL (ref 0.5–1.4)
DIFFERENTIAL METHOD BLD: ABNORMAL
EOSINOPHIL # BLD AUTO: 0 K/UL (ref 0–0.5)
EOSINOPHIL NFR BLD: 0.3 % (ref 0–8)
ERYTHROCYTE [DISTWIDTH] IN BLOOD BY AUTOMATED COUNT: 18.5 % (ref 11.5–14.5)
EST. GFR  (NO RACE VARIABLE): >60 ML/MIN/1.73 M^2
GLUCOSE SERPL-MCNC: 99 MG/DL (ref 70–110)
HCT VFR BLD AUTO: 39.3 % (ref 40–54)
HGB BLD-MCNC: 13.4 G/DL (ref 14–18)
IMM GRANULOCYTES # BLD AUTO: 0.02 K/UL (ref 0–0.04)
IMM GRANULOCYTES NFR BLD AUTO: 0.7 % (ref 0–0.5)
LDH SERPL L TO P-CCNC: 163 U/L (ref 110–260)
LYMPHOCYTES # BLD AUTO: 1.5 K/UL (ref 1–4.8)
LYMPHOCYTES NFR BLD: 49.3 % (ref 18–48)
MCH RBC QN AUTO: 35.8 PG (ref 27–31)
MCHC RBC AUTO-ENTMCNC: 34.1 G/DL (ref 32–36)
MCV RBC AUTO: 105 FL (ref 82–98)
MONOCYTES # BLD AUTO: 0.2 K/UL (ref 0.3–1)
MONOCYTES NFR BLD: 6.2 % (ref 4–15)
NEUTROPHILS # BLD AUTO: 1.3 K/UL (ref 1.8–7.7)
NEUTROPHILS NFR BLD: 43.2 % (ref 38–73)
NRBC BLD-RTO: 0 /100 WBC
PLATELET # BLD AUTO: 79 K/UL (ref 150–450)
PMV BLD AUTO: 9.2 FL (ref 9.2–12.9)
POTASSIUM SERPL-SCNC: 4.1 MMOL/L (ref 3.5–5.1)
PROT SERPL-MCNC: 7.2 G/DL (ref 6–8.4)
RBC # BLD AUTO: 3.74 M/UL (ref 4.6–6.2)
SODIUM SERPL-SCNC: 141 MMOL/L (ref 136–145)
URATE SERPL-MCNC: 4.5 MG/DL (ref 3.4–7)
WBC # BLD AUTO: 3.06 K/UL (ref 3.9–12.7)

## 2024-03-01 PROCEDURE — 84550 ASSAY OF BLOOD/URIC ACID: CPT | Performed by: INTERNAL MEDICINE

## 2024-03-01 PROCEDURE — 80053 COMPREHEN METABOLIC PANEL: CPT | Performed by: INTERNAL MEDICINE

## 2024-03-01 PROCEDURE — 36415 COLL VENOUS BLD VENIPUNCTURE: CPT | Performed by: INTERNAL MEDICINE

## 2024-03-01 PROCEDURE — 99999 PR PBB SHADOW E&M-EST. PATIENT-LVL III: CPT | Mod: PBBFAC,,, | Performed by: INTERNAL MEDICINE

## 2024-03-01 PROCEDURE — 83615 LACTATE (LD) (LDH) ENZYME: CPT | Performed by: INTERNAL MEDICINE

## 2024-03-01 PROCEDURE — 99215 OFFICE O/P EST HI 40 MIN: CPT | Mod: S$GLB,,, | Performed by: INTERNAL MEDICINE

## 2024-03-01 PROCEDURE — 85025 COMPLETE CBC W/AUTO DIFF WBC: CPT | Performed by: INTERNAL MEDICINE

## 2024-03-01 RX ORDER — CARVEDILOL 6.25 MG/1
6.25 TABLET ORAL 2 TIMES DAILY
Qty: 180 TABLET | Refills: 3 | Status: SHIPPED | OUTPATIENT
Start: 2024-03-01 | End: 2024-05-28 | Stop reason: SDUPTHER

## 2024-03-01 RX ORDER — CARVEDILOL 6.25 MG/1
TABLET ORAL
Qty: 180 TABLET | Refills: 3 | OUTPATIENT
Start: 2024-03-01

## 2024-03-01 RX ORDER — CARVEDILOL 6.25 MG/1
6.25 TABLET ORAL 2 TIMES DAILY
Qty: 180 TABLET | Refills: 3 | Status: CANCELLED | OUTPATIENT
Start: 2024-03-01

## 2024-03-04 ENCOUNTER — INFUSION (OUTPATIENT)
Dept: INFUSION THERAPY | Facility: HOSPITAL | Age: 69
End: 2024-03-04
Attending: INTERNAL MEDICINE
Payer: MEDICARE

## 2024-03-04 ENCOUNTER — OFFICE VISIT (OUTPATIENT)
Dept: HEMATOLOGY/ONCOLOGY | Facility: CLINIC | Age: 69
End: 2024-03-04
Payer: MEDICARE

## 2024-03-04 VITALS
DIASTOLIC BLOOD PRESSURE: 64 MMHG | SYSTOLIC BLOOD PRESSURE: 119 MMHG | TEMPERATURE: 98 F | HEART RATE: 82 BPM | HEIGHT: 69 IN | BODY MASS INDEX: 25.7 KG/M2 | OXYGEN SATURATION: 96 % | RESPIRATION RATE: 18 BRPM | WEIGHT: 173.5 LBS

## 2024-03-04 VITALS
OXYGEN SATURATION: 97 % | RESPIRATION RATE: 18 BRPM | HEART RATE: 92 BPM | SYSTOLIC BLOOD PRESSURE: 130 MMHG | BODY MASS INDEX: 25.37 KG/M2 | WEIGHT: 171.31 LBS | HEIGHT: 69 IN | DIASTOLIC BLOOD PRESSURE: 75 MMHG | TEMPERATURE: 98 F

## 2024-03-04 DIAGNOSIS — D61.818 PANCYTOPENIA: ICD-10-CM

## 2024-03-04 DIAGNOSIS — D84.821 IMMUNODEFICIENCY DUE TO DRUG THERAPY: ICD-10-CM

## 2024-03-04 DIAGNOSIS — Z79.899 IMMUNODEFICIENCY DUE TO DRUG THERAPY: ICD-10-CM

## 2024-03-04 DIAGNOSIS — D46.9 MYELODYSPLASTIC SYNDROME: Primary | ICD-10-CM

## 2024-03-04 DIAGNOSIS — Z76.82 STEM CELL TRANSPLANT CANDIDATE: Primary | ICD-10-CM

## 2024-03-04 DIAGNOSIS — D46.9 MYELODYSPLASTIC SYNDROME: ICD-10-CM

## 2024-03-04 PROCEDURE — 99215 OFFICE O/P EST HI 40 MIN: CPT | Mod: S$GLB,,, | Performed by: INTERNAL MEDICINE

## 2024-03-04 PROCEDURE — 25000003 PHARM REV CODE 250: Performed by: INTERNAL MEDICINE

## 2024-03-04 PROCEDURE — 96401 CHEMO ANTI-NEOPL SQ/IM: CPT

## 2024-03-04 PROCEDURE — 63600175 PHARM REV CODE 636 W HCPCS: Performed by: INTERNAL MEDICINE

## 2024-03-04 PROCEDURE — 99999 PR PBB SHADOW E&M-EST. PATIENT-LVL IV: CPT | Mod: PBBFAC,,, | Performed by: INTERNAL MEDICINE

## 2024-03-04 RX ORDER — ONDANSETRON HYDROCHLORIDE 8 MG/1
16 TABLET, FILM COATED ORAL
Status: COMPLETED | OUTPATIENT
Start: 2024-03-04 | End: 2024-03-04

## 2024-03-04 RX ORDER — AZACITIDINE 100 MG/1
75 INJECTION, POWDER, LYOPHILIZED, FOR SOLUTION INTRAVENOUS; SUBCUTANEOUS
Status: COMPLETED | OUTPATIENT
Start: 2024-03-04 | End: 2024-03-04

## 2024-03-04 RX ADMIN — AZACITIDINE 140 MG: 100 INJECTION, POWDER, LYOPHILIZED, FOR SOLUTION INTRAVENOUS; SUBCUTANEOUS at 12:03

## 2024-03-04 RX ADMIN — ONDANSETRON HYDROCHLORIDE 16 MG: 8 TABLET, FILM COATED ORAL at 12:03

## 2024-03-04 NOTE — PLAN OF CARE
Patient tolerated Cycle 8 Day 1 Vidaza injection well, VSS, no complaints at this time. Next appointment scheduled, AVS and treatment calendar printed and reviewed. Pt  d/c in no acute distress.

## 2024-03-04 NOTE — Clinical Note
Matti Mcdonald!  He wants to go to [place] around 4/1/24, so he would like to delay his next cycle (cycle 9) until 4/22/24 if you don't mind adjusting his schedule? Also we will be moving forward with transplant in May using his son as the donor! Just keeping you in the loop. Thanks!  Paco Declines

## 2024-03-04 NOTE — Clinical Note
Can we repeat the DSA typing per Dr. Thompson's request for his son and move forward with transplant using his son as the donor? BM graft source. He'll be in [place] 4/1-4/22 just FYI. Thanks!

## 2024-03-05 ENCOUNTER — INFUSION (OUTPATIENT)
Dept: INFUSION THERAPY | Facility: HOSPITAL | Age: 69
End: 2024-03-05
Attending: INTERNAL MEDICINE
Payer: MEDICARE

## 2024-03-05 VITALS
RESPIRATION RATE: 18 BRPM | TEMPERATURE: 98 F | OXYGEN SATURATION: 95 % | HEIGHT: 69 IN | DIASTOLIC BLOOD PRESSURE: 74 MMHG | WEIGHT: 173.5 LBS | SYSTOLIC BLOOD PRESSURE: 126 MMHG | HEART RATE: 76 BPM | BODY MASS INDEX: 25.7 KG/M2

## 2024-03-05 DIAGNOSIS — D46.9 MYELODYSPLASTIC SYNDROME: Primary | ICD-10-CM

## 2024-03-05 PROCEDURE — 25000003 PHARM REV CODE 250: Performed by: INTERNAL MEDICINE

## 2024-03-05 PROCEDURE — 63600175 PHARM REV CODE 636 W HCPCS: Performed by: INTERNAL MEDICINE

## 2024-03-05 PROCEDURE — 96401 CHEMO ANTI-NEOPL SQ/IM: CPT

## 2024-03-05 RX ORDER — AZACITIDINE 100 MG/1
75 INJECTION, POWDER, LYOPHILIZED, FOR SOLUTION INTRAVENOUS; SUBCUTANEOUS
Status: COMPLETED | OUTPATIENT
Start: 2024-03-05 | End: 2024-03-05

## 2024-03-05 RX ORDER — ONDANSETRON HYDROCHLORIDE 8 MG/1
16 TABLET, FILM COATED ORAL
Status: COMPLETED | OUTPATIENT
Start: 2024-03-05 | End: 2024-03-05

## 2024-03-05 RX ADMIN — ONDANSETRON HYDROCHLORIDE 16 MG: 8 TABLET, FILM COATED ORAL at 12:03

## 2024-03-05 RX ADMIN — AZACITIDINE 140 MG: 100 INJECTION, POWDER, LYOPHILIZED, FOR SOLUTION INTRAVENOUS; SUBCUTANEOUS at 12:03

## 2024-03-05 NOTE — PROGRESS NOTES
Section of Hematology and Stem Cell Transplantation  Follow Up Visit     Date of visit: 3/4/24  Visit diagnosis: Stem cell transplant candidate [Z76.82]  Referred by:  Harry Diamond MD    Oncologic History:     Primary Oncologic Diagnosis: Myelodysplastic syndrome excess blasts 2, IPSS-M very high risk    4/12/23: Initial evaluation by Dr. Diamond of anemia. WBC 2.71, Hgb 7.1, Plts  400. Prior to this visit, he was in [place] for a dental procedure. His symptoms of fatigue started after extractions. Workup by Dr. Diamond unremarkable.   4/26/23: Bone marrow biopsy revealed a hypercellular marrow (80-90%) with increased blasts (8-12%) concerning for MDS EB2. However, sample was limited.   5/23/23: Repeat bone marrow biopsy revealed myelodysplastic syndrome with excess blasts 2 (blasts 16-17%). 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 NGS with ASXL1 (44%), EZH2 (87%), IDH2 (42%), STAG2 (89%), U2AF1 (3%).  6/12/23: Cycle 1 of azacitidine 75 mg/m2 plus venetoclax x14 of 28 days.   7/3/23: Bone marrow biopsy at the end of cycle 1 revealed a hypocellular marrow, no blasts, trilineage hypoplasia and dyspoiesis with increased micromegakaryocytes. FISH with trisomy 8 (three copies of [id number]). NGS with ASXL1 (20%), EZH2 (40%), IDH2 (17%), STAG2 (38%).  9/6/23: Bone marrow biopsy revealed dysplastic changes but blasts were not increased. Diploid karyotype. NGS with ASXL1 (16%).    History of Present Ilness:   Guillaume Salinas (Guillaume) is a pleasant 68 y.o.male with PVD, CAD, HTN who presents for follow up. He feels well at this time. No new side effects from Aza/Taz. He is tolerating treatment well.    Patient was seen today in conjunction with a .    PAST MEDICAL HISTORY:   Past Medical History:   Diagnosis Date    Anticoagulant long-term use     Coronary artery disease     Hypertension     Peripheral vascular disease, unspecified        PAST SURGICAL HISTORY:   Past Surgical History:   Procedure  Laterality Date    BONE MARROW BIOPSY Left 2023    Procedure: Biopsy-bone marrow;  Surgeon: Harry Diamond MD;  Location: [facility] OR;  Service: Oncology;  Laterality: Left;    COLONOSCOPY N/A 2022    Procedure: COLONOSCOPY Golytely Vaccinated will bring cards;  Surgeon: Dereje Simon MD;  Location: [facility] ENDO;  Service: Endoscopy;  Laterality: N/A;  Do not cancel this order       PAST SOCIAL HISTORY:  Social History     Tobacco Use    Smoking status: Former     Current packs/day: 0.00     Average packs/day: 0.3 packs/day for 50.0 years (12.5 ttl pk-yrs)     Types: Cigarettes     Start date: 3/1/1973     Quit date: 3/1/2023     Years since quittin.0    Smokeless tobacco: Never   Substance Use Topics    Alcohol use: Not Currently    Drug use: Never       FAMILY HISTORY:  Family History   Problem Relation Age of Onset    Hypertension Mother     Cancer Father     Cancer Brother     No Known Problems Daughter     No Known Problems Daughter     No Known Problems Son        CURRENT MEDICATIONS:   Current Outpatient Medications   Medication Sig    acyclovir (ZOVIRAX) 400 MG tablet Take 1 tablet (400 mg total) by mouth 2 (two) times daily.    aspirin (ECOTRIN) 81 MG EC tablet Take 1 tablet (81 mg total) by mouth once daily.    atorvastatin (LIPITOR) 80 MG tablet Take 1 tablet (80 mg total) by mouth once daily.    carvediloL (COREG) 6.25 MG tablet Take 1 tablet (6.25 mg total) by mouth 2 (two) times daily.    cilostazoL (PLETAL) 50 MG Tab Take 1 tablet (50 mg total) by mouth 2 (two) times daily.    cyanocobalamin (VITAMIN B-12) 1000 MCG tablet Take 2 tablets (2,000 mcg total) by mouth once daily.    docusate sodium (COLACE) 100 MG capsule Take 100 mg by mouth 2 (two) times daily as needed for Constipation.    gabapentin (NEURONTIN) 300 MG capsule TAKE 1 CAPSULE(300 MG) BY MOUTH THREE TIMES DAILY    levoFLOXacin (LEVAQUIN) 500 MG tablet Take 1 tablet (500 mg total) by mouth once daily.    promethazine  (PHENERGAN) 25 MG tablet Take 1 tablet (25 mg total) by mouth every 8 (eight) hours as needed for Nausea.    venetoclax (VENCLEXTA) 100 mg Tab Take 1 tablet (100 mg) by mouth once daily on days 1-7 of a 28-day cycle. Do not discard any remaining tablets.    voriconazole (VFEND) 200 MG Tab Take 1 tablet (200 mg total) by mouth 2 (two) times daily.    zolpidem (AMBIEN) 10 mg Tab TAKE 1 TABLET BY MOUTH EVERY NIGHT AT BEDTIME AS NEEDED     No current facility-administered medications for this visit.       ALLERGIES:   Review of patient's allergies indicates:  No Known Allergies    Review of Systems:     Pertinent positives and negatives included in the HPI. Otherwise a complete review of systems is negative.    Physical Exam:     Vitals:    03/04/24 1457   BP: 119/64   Pulse: 82   Resp: 18   Temp: 97.8 °F (36.6 °C)     General: Appears well, NAD  Pulmonary: CTAB, no increased work of breathing, no W/R/C  Cardiovascular: S1S2 normal, RRR, no M/R/G  Abdominal: Soft, NT, ND, BS+, no HSM  Extremities: No C/C/E  Neurological: AAOx4, grossly normal, no focal deficits  Dermatologic: No appreciable rashes or lesions     ECOG Performance Status: (foot note - ECOG PS provided by [employer]cology [employer]) 1 - Symptomatic but completely ambulatory    Karnofsky Performance Score:  80%- Normal Activity with Effort: Some Symptoms of Disease    Labs:   Lab Results   Component Value Date    WBC 3.06 (L) 03/01/2024    RBC 3.74 (L) 03/01/2024    HGB 13.4 (L) 03/01/2024    HCT 39.3 (L) 03/01/2024     (H) 03/01/2024    MCH 35.8 (H) 03/01/2024    MCHC 34.1 03/01/2024    RDW 18.5 (H) 03/01/2024    PLT 79 (L) 03/01/2024    MPV 9.2 03/01/2024    GRAN 1.3 (L) 03/01/2024    GRAN 43.2 03/01/2024    LYMPH 1.5 03/01/2024    LYMPH 49.3 (H) 03/01/2024    MONO 0.2 (L) 03/01/2024    MONO 6.2 03/01/2024    EOS 0.0 03/01/2024    BASO 0.01 03/01/2024    EOSINOPHIL 0.3 03/01/2024    BASOPHIL 0.3 03/01/2024       CMP  Sodium   Date Value Ref  Range Status   03/01/2024 141 136 - 145 mmol/L Final     Potassium   Date Value Ref Range Status   03/01/2024 4.1 3.5 - 5.1 mmol/L Final     Chloride   Date Value Ref Range Status   03/01/2024 103 95 - 110 mmol/L Final     CO2   Date Value Ref Range Status   03/01/2024 26 23 - 29 mmol/L Final     Glucose   Date Value Ref Range Status   03/01/2024 99 70 - 110 mg/dL Final     BUN   Date Value Ref Range Status   03/01/2024 13 8 - 23 mg/dL Final     Creatinine   Date Value Ref Range Status   03/01/2024 1.0 0.5 - 1.4 mg/dL Final     Calcium   Date Value Ref Range Status   03/01/2024 9.6 8.7 - 10.5 mg/dL Final     Total Protein   Date Value Ref Range Status   03/01/2024 7.2 6.0 - 8.4 g/dL Final     Albumin   Date Value Ref Range Status   03/01/2024 4.1 3.5 - 5.2 g/dL Final     Total Bilirubin   Date Value Ref Range Status   03/01/2024 0.4 0.1 - 1.0 mg/dL Final     Comment:     For infants and newborns, interpretation of results should be based  on gestational age, weight and in agreement with clinical  observations.    Premature Infant recommended reference ranges:  Up to 24 hours.............<8.0 mg/dL  Up to 48 hours............<12.0 mg/dL  3-5 days..................<15.0 mg/dL  6-29 days.................<15.0 mg/dL       Alkaline Phosphatase   Date Value Ref Range Status   03/01/2024 76 55 - 135 U/L Final     AST   Date Value Ref Range Status   03/01/2024 17 10 - 40 U/L Final     ALT   Date Value Ref Range Status   03/01/2024 20 10 - 44 U/L Final     Anion Gap   Date Value Ref Range Status   03/01/2024 12 8 - 16 mmol/L Final     eGFR if    Date Value Ref Range Status   08/27/2021 >60 >60 mL/min/1.73 m^2 Final   08/27/2021 >60 >60 mL/min/1.73 m^2 Final   08/27/2021 >60 >60 mL/min/1.73 m^2 Final     eGFR if non    Date Value Ref Range Status   08/27/2021 57 (A) >60 mL/min/1.73 m^2 Final     Comment:     Calculation used to obtain the estimated glomerular filtration  rate (eGFR) is the  CKD-EPI equation.      08/27/2021 57 (A) >60 mL/min/1.73 m^2 Final     Comment:     Calculation used to obtain the estimated glomerular filtration  rate (eGFR) is the CKD-EPI equation.      08/27/2021 57 (A) >60 mL/min/1.73 m^2 Final     Comment:     Calculation used to obtain the estimated glomerular filtration  rate (eGFR) is the CKD-EPI equation.          Imaging:   No results found for this or any previous visit (from the past 2160 hour(s)).    Pathology:  Reviewed     Assessment and Plan:   Guillaume Salinas (Guillaume) is a pleasant 68 y.o.male with PVD, CAD, HTN who presents for follow up.    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 NGS with ASXL1 (44%), EZH2 (87%), IDH2 (42%), STAG2 (89%), U2AF1 (3%). He started treatment with azacitidine 75 mg/m2 daily x7 days plus venetoclax 100mg (voriconazole) daily x14 of 28 days. Venetoclax decreased to 7 days with cycle 3. Bone marrow biopsy 9/6/23 revealed dysplastic changes but blasts were not increased. Diploid karyotype. NGS with ASXL1 (16%).  Continue azacitidine x5 days plus venetoclax x7 of 28 days as above.   He will be traveling to [place], so will need to delay cycle 9 to 4/22/24.  Prior to starting each cycle - ANC >500 and Plts >50.    Stem cell transplant candidate: We discussed the role for allogeneic stem cell transplantation in this disease process as a potentially curative option. We had an extensive discussion about the rationale, logistics, risks, and benefits. We reviewed the requirement to stay in the [place] area for 100 days with a caregiver at all times. We discussed the risks, including infection, graft failure, organ toxicity, graft versus host disease, relapse of disease, and secondary cancers. We reviewed the need for long-term immunosuppression and need for close monitoring. HCT-CI of 1 (intermediate risk). Will move forward with transplant using son (pending  DSAs) as the donor. Tentative plans for transplant in May 2024.  Coordinator: Fay Stephens  Regimen:  + 2Gy TBI  Donor: son (haplo)  Graft source: BM    Symptomatic anemia: Related to MDS. Twice weekly monitoring in [place]. Transfuse for Hgb <7.    Thrombocytopenia: Related to MDS. Monitor and transfuse for Plts <10.    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     Orders/Follow Up:             Route Chart for Scheduling    BMT Chart Routing      Follow up with physician 6 weeks. Around 4/22/24   Follow up with [facility]    Provider visit type    Infusion scheduling note    Injection scheduling note    Labs    Imaging    Pharmacy appointment    Other referrals                Treatment Plan Information   OP AZACITIDINE 7-DAY (SUB-Q)   Harry Diamond MD   Upcoming Treatment Dates - OP AZACITIDINE 7-DAY (SUB-Q)    3/5/2024       Pre-Medications       ondansetron tablet 16 mg       Chemotherapy       azaCITIDine (VIDAZA) chemo injection 140 mg  3/6/2024       Pre-Medications       ondansetron tablet 16 mg       Chemotherapy       azaCITIDine (VIDAZA) chemo injection 140 mg  3/7/2024       Pre-Medications       ondansetron tablet 16 mg       Chemotherapy       azaCITIDine (VIDAZA) chemo injection 140 mg  3/8/2024       Pre-Medications       ondansetron tablet 16 mg       Chemotherapy       azaCITIDine (VIDAZA) chemo injection 140 mg    Therapy Plan Information  cyanocobalamin injection 1,000 mcg  1,000 mcg, Intramuscular, Every visit      Total time of this visit was 40 minutes, including time spent face to face with patient, and also in preparing for today's visit for MDM and documentation. (Medical Decision Making, including consideration of possible diagnoses, management options, complex medical record review, review of diagnostic tests and information, consideration and discussion of significant complications based on comorbidities, and discussion with  providers involved with the care of the patient). Greater than 50% was spent face to face with the patient counseling and coordinating care.    Paco Hickey MD  Hematology, Oncology, and Stem Cell Transplantation  [facility]

## 2024-03-05 NOTE — PLAN OF CARE
Patient tolerated Cycle 8 Day 2 Vidaza injection well, VSS, no complaints at this time. Pt d/c in no acute distress.

## 2024-03-06 ENCOUNTER — INFUSION (OUTPATIENT)
Dept: INFUSION THERAPY | Facility: HOSPITAL | Age: 69
End: 2024-03-06
Attending: INTERNAL MEDICINE
Payer: MEDICARE

## 2024-03-06 VITALS
RESPIRATION RATE: 18 BRPM | BODY MASS INDEX: 25.7 KG/M2 | SYSTOLIC BLOOD PRESSURE: 115 MMHG | HEIGHT: 69 IN | DIASTOLIC BLOOD PRESSURE: 61 MMHG | TEMPERATURE: 98 F | HEART RATE: 88 BPM | WEIGHT: 173.5 LBS | OXYGEN SATURATION: 95 %

## 2024-03-06 DIAGNOSIS — D46.9 MYELODYSPLASTIC SYNDROME: Primary | ICD-10-CM

## 2024-03-06 PROCEDURE — 96401 CHEMO ANTI-NEOPL SQ/IM: CPT

## 2024-03-06 PROCEDURE — 63600175 PHARM REV CODE 636 W HCPCS: Performed by: INTERNAL MEDICINE

## 2024-03-06 PROCEDURE — 25000003 PHARM REV CODE 250: Performed by: INTERNAL MEDICINE

## 2024-03-06 RX ORDER — AZACITIDINE 100 MG/1
75 INJECTION, POWDER, LYOPHILIZED, FOR SOLUTION INTRAVENOUS; SUBCUTANEOUS
Status: COMPLETED | OUTPATIENT
Start: 2024-03-06 | End: 2024-03-06

## 2024-03-06 RX ORDER — ONDANSETRON HYDROCHLORIDE 8 MG/1
16 TABLET, FILM COATED ORAL
Status: COMPLETED | OUTPATIENT
Start: 2024-03-06 | End: 2024-03-06

## 2024-03-06 RX ADMIN — ONDANSETRON HYDROCHLORIDE 16 MG: 8 TABLET, FILM COATED ORAL at 12:03

## 2024-03-06 RX ADMIN — AZACITIDINE 140 MG: 100 INJECTION, POWDER, LYOPHILIZED, FOR SOLUTION INTRAVENOUS; SUBCUTANEOUS at 12:03

## 2024-03-07 ENCOUNTER — INFUSION (OUTPATIENT)
Dept: INFUSION THERAPY | Facility: HOSPITAL | Age: 69
End: 2024-03-07
Attending: INTERNAL MEDICINE
Payer: MEDICARE

## 2024-03-07 VITALS
RESPIRATION RATE: 18 BRPM | WEIGHT: 173.5 LBS | HEIGHT: 69 IN | HEART RATE: 104 BPM | OXYGEN SATURATION: 95 % | TEMPERATURE: 98 F | BODY MASS INDEX: 25.7 KG/M2 | DIASTOLIC BLOOD PRESSURE: 70 MMHG | SYSTOLIC BLOOD PRESSURE: 118 MMHG

## 2024-03-07 DIAGNOSIS — D46.9 MYELODYSPLASTIC SYNDROME: Primary | ICD-10-CM

## 2024-03-07 PROCEDURE — 96401 CHEMO ANTI-NEOPL SQ/IM: CPT

## 2024-03-07 PROCEDURE — 25000003 PHARM REV CODE 250: Performed by: INTERNAL MEDICINE

## 2024-03-07 PROCEDURE — 63600175 PHARM REV CODE 636 W HCPCS: Performed by: INTERNAL MEDICINE

## 2024-03-07 RX ORDER — ONDANSETRON HYDROCHLORIDE 8 MG/1
16 TABLET, FILM COATED ORAL
Status: COMPLETED | OUTPATIENT
Start: 2024-03-07 | End: 2024-03-07

## 2024-03-07 RX ORDER — AZACITIDINE 100 MG/1
75 INJECTION, POWDER, LYOPHILIZED, FOR SOLUTION INTRAVENOUS; SUBCUTANEOUS
Status: COMPLETED | OUTPATIENT
Start: 2024-03-07 | End: 2024-03-07

## 2024-03-07 RX ADMIN — ONDANSETRON HYDROCHLORIDE 16 MG: 8 TABLET, FILM COATED ORAL at 12:03

## 2024-03-07 RX ADMIN — AZACITIDINE 140 MG: 100 INJECTION, POWDER, LYOPHILIZED, FOR SOLUTION INTRAVENOUS; SUBCUTANEOUS at 12:03

## 2024-03-07 NOTE — PLAN OF CARE
Patient tolerated cycle 8 day 4 Vidaza injection well, VSS, no complaints at this time. Pt d/c in no acute distress.

## 2024-03-08 ENCOUNTER — INFUSION (OUTPATIENT)
Dept: INFUSION THERAPY | Facility: HOSPITAL | Age: 69
End: 2024-03-08
Attending: INTERNAL MEDICINE
Payer: MEDICARE

## 2024-03-08 VITALS
SYSTOLIC BLOOD PRESSURE: 124 MMHG | HEART RATE: 99 BPM | OXYGEN SATURATION: 96 % | RESPIRATION RATE: 18 BRPM | TEMPERATURE: 98 F | HEIGHT: 69 IN | DIASTOLIC BLOOD PRESSURE: 82 MMHG | WEIGHT: 173.5 LBS | BODY MASS INDEX: 25.7 KG/M2

## 2024-03-08 DIAGNOSIS — D46.9 MYELODYSPLASTIC SYNDROME: Primary | ICD-10-CM

## 2024-03-08 PROCEDURE — 96401 CHEMO ANTI-NEOPL SQ/IM: CPT

## 2024-03-08 PROCEDURE — 25000003 PHARM REV CODE 250: Performed by: INTERNAL MEDICINE

## 2024-03-08 PROCEDURE — 63600175 PHARM REV CODE 636 W HCPCS: Performed by: INTERNAL MEDICINE

## 2024-03-08 RX ORDER — AZACITIDINE 100 MG/1
75 INJECTION, POWDER, LYOPHILIZED, FOR SOLUTION INTRAVENOUS; SUBCUTANEOUS
Status: COMPLETED | OUTPATIENT
Start: 2024-03-08 | End: 2024-03-08

## 2024-03-08 RX ORDER — ONDANSETRON HYDROCHLORIDE 8 MG/1
16 TABLET, FILM COATED ORAL
Status: COMPLETED | OUTPATIENT
Start: 2024-03-08 | End: 2024-03-08

## 2024-03-08 RX ADMIN — AZACITIDINE 140 MG: 100 INJECTION, POWDER, LYOPHILIZED, FOR SOLUTION INTRAVENOUS; SUBCUTANEOUS at 12:03

## 2024-03-08 RX ADMIN — ONDANSETRON HYDROCHLORIDE 16 MG: 8 TABLET, FILM COATED ORAL at 12:03

## 2024-03-08 NOTE — PLAN OF CARE
Patient tolerated cycle 8 day 5 Vidaza injection well, no complaints at this time. VSS. Next appointments scheduled and pt d/c in no acute distress.

## 2024-03-14 ENCOUNTER — DOCUMENTATION ONLY (OUTPATIENT)
Dept: HEMATOLOGY/ONCOLOGY | Facility: CLINIC | Age: 69
End: 2024-03-14
Payer: MEDICARE

## 2024-03-18 DIAGNOSIS — D49.9 IMMUNODEFICIENCY SECONDARY TO NEOPLASM: ICD-10-CM

## 2024-03-18 DIAGNOSIS — D84.81 IMMUNODEFICIENCY SECONDARY TO NEOPLASM: ICD-10-CM

## 2024-03-18 DIAGNOSIS — G47.00 INSOMNIA, UNSPECIFIED TYPE: ICD-10-CM

## 2024-03-18 DIAGNOSIS — Z76.82 STEM CELL TRANSPLANT CANDIDATE: Primary | ICD-10-CM

## 2024-03-18 DIAGNOSIS — D46.9 MYELODYSPLASTIC SYNDROME: ICD-10-CM

## 2024-03-18 RX ORDER — LEVOFLOXACIN 500 MG/1
500 TABLET, FILM COATED ORAL DAILY
Qty: 30 TABLET | Refills: 11 | Status: SHIPPED | OUTPATIENT
Start: 2024-03-18 | End: 2024-05-24 | Stop reason: SDUPTHER

## 2024-03-18 RX ORDER — ZOLPIDEM TARTRATE 10 MG/1
10 TABLET ORAL NIGHTLY PRN
Qty: 30 TABLET | Refills: 0 | Status: SHIPPED | OUTPATIENT
Start: 2024-03-18 | End: 2024-04-16

## 2024-03-18 RX ORDER — VORICONAZOLE 200 MG/1
200 TABLET, FILM COATED ORAL 2 TIMES DAILY
Qty: 60 TABLET | Refills: 11 | Status: SHIPPED | OUTPATIENT
Start: 2024-03-18 | End: 2024-05-24 | Stop reason: SDUPTHER

## 2024-03-18 RX ORDER — ACYCLOVIR 400 MG/1
400 TABLET ORAL 2 TIMES DAILY
Qty: 60 TABLET | Refills: 11 | Status: SHIPPED | OUTPATIENT
Start: 2024-03-18 | End: 2024-05-24 | Stop reason: SDUPTHER

## 2024-03-25 DIAGNOSIS — M79.2 NEUROPATHIC PAIN: ICD-10-CM

## 2024-03-25 RX ORDER — GABAPENTIN 300 MG/1
300 CAPSULE ORAL 3 TIMES DAILY
Qty: 90 CAPSULE | Refills: 11 | Status: SHIPPED | OUTPATIENT
Start: 2024-03-25 | End: 2024-05-02 | Stop reason: SDUPTHER

## 2024-04-05 ENCOUNTER — ON-DEMAND VIRTUAL (OUTPATIENT)
Dept: URGENT CARE | Facility: CLINIC | Age: 69
End: 2024-04-05
Payer: MEDICARE

## 2024-04-05 ENCOUNTER — PATIENT MESSAGE (OUTPATIENT)
Dept: HEMATOLOGY/ONCOLOGY | Facility: CLINIC | Age: 69
End: 2024-04-05
Payer: MEDICARE

## 2024-04-05 NOTE — PROGRESS NOTES
Pt's is not located in [place] -- he's in [place] and daughter calling is in [place].    This encounter was created in error - please disregard.

## 2024-04-14 ENCOUNTER — PATIENT MESSAGE (OUTPATIENT)
Dept: HEMATOLOGY/ONCOLOGY | Facility: CLINIC | Age: 69
End: 2024-04-14
Payer: MEDICARE

## 2024-04-15 ENCOUNTER — DOCUMENTATION ONLY (OUTPATIENT)
Dept: HEMATOLOGY/ONCOLOGY | Facility: CLINIC | Age: 69
End: 2024-04-15
Payer: MEDICARE

## 2024-04-15 ENCOUNTER — LAB VISIT (OUTPATIENT)
Dept: LAB | Facility: HOSPITAL | Age: 69
End: 2024-04-15
Attending: INTERNAL MEDICINE
Payer: MEDICARE

## 2024-04-15 ENCOUNTER — OFFICE VISIT (OUTPATIENT)
Dept: HEMATOLOGY/ONCOLOGY | Facility: CLINIC | Age: 69
End: 2024-04-15
Payer: MEDICARE

## 2024-04-15 ENCOUNTER — TELEPHONE (OUTPATIENT)
Dept: FAMILY MEDICINE | Facility: HOSPITAL | Age: 69
End: 2024-04-15
Payer: MEDICARE

## 2024-04-15 VITALS
OXYGEN SATURATION: 98 % | DIASTOLIC BLOOD PRESSURE: 62 MMHG | TEMPERATURE: 98 F | WEIGHT: 169.44 LBS | HEART RATE: 107 BPM | BODY MASS INDEX: 25.02 KG/M2 | SYSTOLIC BLOOD PRESSURE: 130 MMHG

## 2024-04-15 DIAGNOSIS — D46.9 MYELODYSPLASTIC SYNDROME: Primary | ICD-10-CM

## 2024-04-15 DIAGNOSIS — Z76.82 STEM CELL TRANSPLANT CANDIDATE: ICD-10-CM

## 2024-04-15 DIAGNOSIS — D61.818 PANCYTOPENIA: ICD-10-CM

## 2024-04-15 DIAGNOSIS — K20.90 ESOPHAGITIS: ICD-10-CM

## 2024-04-15 DIAGNOSIS — D46.9 MYELODYSPLASTIC SYNDROME: ICD-10-CM

## 2024-04-15 DIAGNOSIS — D84.821 IMMUNODEFICIENCY DUE TO DRUG THERAPY: ICD-10-CM

## 2024-04-15 DIAGNOSIS — G47.00 INSOMNIA, UNSPECIFIED TYPE: ICD-10-CM

## 2024-04-15 DIAGNOSIS — D64.9 SYMPTOMATIC ANEMIA: ICD-10-CM

## 2024-04-15 DIAGNOSIS — Z79.899 IMMUNODEFICIENCY DUE TO DRUG THERAPY: ICD-10-CM

## 2024-04-15 LAB
ABO + RH BLD: NORMAL
ANISOCYTOSIS BLD QL SMEAR: SLIGHT
BASOPHILS # BLD AUTO: 0.02 K/UL (ref 0–0.2)
BASOPHILS NFR BLD: 0.9 % (ref 0–1.9)
BLD GP AB SCN CELLS X3 SERPL QL: NORMAL
DIFFERENTIAL METHOD BLD: ABNORMAL
EOSINOPHIL # BLD AUTO: 0 K/UL (ref 0–0.5)
EOSINOPHIL NFR BLD: 0.9 % (ref 0–8)
ERYTHROCYTE [DISTWIDTH] IN BLOOD BY AUTOMATED COUNT: 17.1 % (ref 11.5–14.5)
HCT VFR BLD AUTO: 23.7 % (ref 40–54)
HGB BLD-MCNC: 8.3 G/DL (ref 14–18)
HYPOCHROMIA BLD QL SMEAR: ABNORMAL
IMM GRANULOCYTES # BLD AUTO: 0.01 K/UL (ref 0–0.04)
IMM GRANULOCYTES NFR BLD AUTO: 0.5 % (ref 0–0.5)
LYMPHOCYTES # BLD AUTO: 1.7 K/UL (ref 1–4.8)
LYMPHOCYTES NFR BLD: 77 % (ref 18–48)
MCH RBC QN AUTO: 36.2 PG (ref 27–31)
MCHC RBC AUTO-ENTMCNC: 35 G/DL (ref 32–36)
MCV RBC AUTO: 104 FL (ref 82–98)
MONOCYTES # BLD AUTO: 0.1 K/UL (ref 0.3–1)
MONOCYTES NFR BLD: 4.1 % (ref 4–15)
NEUTROPHILS # BLD AUTO: 0.4 K/UL (ref 1.8–7.7)
NEUTROPHILS NFR BLD: 16.6 % (ref 38–73)
NRBC BLD-RTO: 0 /100 WBC
PLATELET # BLD AUTO: 21 K/UL (ref 150–450)
PLATELET BLD QL SMEAR: ABNORMAL
PMV BLD AUTO: 12.9 FL (ref 9.2–12.9)
RBC # BLD AUTO: 2.29 M/UL (ref 4.6–6.2)
SPECIMEN OUTDATE: NORMAL
WBC # BLD AUTO: 2.22 K/UL (ref 3.9–12.7)

## 2024-04-15 PROCEDURE — P9040 RBC LEUKOREDUCED IRRADIATED: HCPCS | Performed by: INTERNAL MEDICINE

## 2024-04-15 PROCEDURE — 86901 BLOOD TYPING SEROLOGIC RH(D): CPT | Mod: NBTX | Performed by: INTERNAL MEDICINE

## 2024-04-15 PROCEDURE — 86920 COMPATIBILITY TEST SPIN: CPT | Mod: NBTX | Performed by: INTERNAL MEDICINE

## 2024-04-15 PROCEDURE — 99999 PR PBB SHADOW E&M-EST. PATIENT-LVL IV: CPT | Mod: PBBFAC,,, | Performed by: INTERNAL MEDICINE

## 2024-04-15 PROCEDURE — 85025 COMPLETE CBC W/AUTO DIFF WBC: CPT | Performed by: INTERNAL MEDICINE

## 2024-04-15 RX ORDER — PANTOPRAZOLE SODIUM 40 MG/1
40 TABLET, DELAYED RELEASE ORAL DAILY
Qty: 30 TABLET | Refills: 3 | Status: SHIPPED | OUTPATIENT
Start: 2024-04-15 | End: 2024-08-13

## 2024-04-15 NOTE — TELEPHONE ENCOUNTER
Carri from [facility] called with a Critical lab value of Platelets-21. High Priority message sent to Dr. Harry Diamond and staff. Value entered into Flowsheets.

## 2024-04-16 ENCOUNTER — TELEPHONE (OUTPATIENT)
Dept: HEMATOLOGY/ONCOLOGY | Facility: CLINIC | Age: 69
End: 2024-04-16
Payer: MEDICARE

## 2024-04-16 DIAGNOSIS — D64.9 SYMPTOMATIC ANEMIA: Primary | ICD-10-CM

## 2024-04-16 RX ORDER — DIPHENHYDRAMINE HCL 25 MG
25 CAPSULE ORAL
Status: CANCELLED | OUTPATIENT
Start: 2024-04-16

## 2024-04-16 RX ORDER — ZOLPIDEM TARTRATE 10 MG/1
10 TABLET ORAL NIGHTLY PRN
Qty: 30 TABLET | Refills: 0 | Status: SHIPPED | OUTPATIENT
Start: 2024-04-16 | End: 2024-05-16 | Stop reason: SDUPTHER

## 2024-04-16 RX ORDER — ACETAMINOPHEN 325 MG/1
650 TABLET ORAL
Status: CANCELLED | OUTPATIENT
Start: 2024-04-16

## 2024-04-16 RX ORDER — HYDROCODONE BITARTRATE AND ACETAMINOPHEN 500; 5 MG/1; MG/1
TABLET ORAL ONCE
Status: CANCELLED | OUTPATIENT
Start: 2024-04-16 | End: 2024-04-16

## 2024-04-17 ENCOUNTER — INFUSION (OUTPATIENT)
Dept: INFUSION THERAPY | Facility: HOSPITAL | Age: 69
End: 2024-04-17
Attending: INTERNAL MEDICINE
Payer: MEDICARE

## 2024-04-17 VITALS
SYSTOLIC BLOOD PRESSURE: 112 MMHG | HEART RATE: 83 BPM | OXYGEN SATURATION: 99 % | TEMPERATURE: 98 F | BODY MASS INDEX: 25.1 KG/M2 | DIASTOLIC BLOOD PRESSURE: 65 MMHG | WEIGHT: 169.44 LBS | RESPIRATION RATE: 16 BRPM | HEIGHT: 69 IN

## 2024-04-17 DIAGNOSIS — D64.9 SYMPTOMATIC ANEMIA: ICD-10-CM

## 2024-04-17 PROCEDURE — P9040 RBC LEUKOREDUCED IRRADIATED: HCPCS | Mod: NBTX,BTX | Performed by: INTERNAL MEDICINE

## 2024-04-17 PROCEDURE — 36430 TRANSFUSION BLD/BLD COMPNT: CPT

## 2024-04-17 PROCEDURE — 25000003 PHARM REV CODE 250: Mod: BTX | Performed by: INTERNAL MEDICINE

## 2024-04-17 RX ORDER — DIPHENHYDRAMINE HCL 25 MG
25 CAPSULE ORAL
Status: DISCONTINUED | OUTPATIENT
Start: 2024-04-17 | End: 2024-04-17 | Stop reason: HOSPADM

## 2024-04-17 RX ORDER — ACETAMINOPHEN 325 MG/1
650 TABLET ORAL
Status: DISCONTINUED | OUTPATIENT
Start: 2024-04-17 | End: 2024-04-17 | Stop reason: HOSPADM

## 2024-04-17 RX ORDER — HYDROCODONE BITARTRATE AND ACETAMINOPHEN 500; 5 MG/1; MG/1
TABLET ORAL ONCE
Status: COMPLETED | OUTPATIENT
Start: 2024-04-17 | End: 2024-04-17

## 2024-04-17 RX ADMIN — SODIUM CHLORIDE: 9 INJECTION, SOLUTION INTRAVENOUS at 08:04

## 2024-04-17 NOTE — PROGRESS NOTES
Section of Hematology and Stem Cell Transplantation  Follow Up Visit     Date of visit: 4/15/24  Visit diagnosis: Myelodysplastic syndrome [D46.9]  Referred by:  Harry Diamond MD    Oncologic History:     Primary Oncologic Diagnosis: Myelodysplastic syndrome excess blasts 2, IPSS-M very high risk    4/12/23: Initial evaluation by Dr. Diamond of anemia. WBC 2.71, Hgb 7.1, Plts  400. Prior to this visit, he was in [place] for a dental procedure. His symptoms of fatigue started after extractions. Workup by Dr. Diamond unremarkable.   4/26/23: Bone marrow biopsy revealed a hypercellular marrow (80-90%) with increased blasts (8-12%) concerning for MDS EB2. However, sample was limited.   5/23/23: Repeat bone marrow biopsy revealed myelodysplastic syndrome with excess blasts 2 (blasts 16-17%). 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 NGS with ASXL1 (44%), EZH2 (87%), IDH2 (42%), STAG2 (89%), U2AF1 (3%).  6/12/23: Cycle 1 of azacitidine 75 mg/m2 plus venetoclax x14 of 28 days.   7/3/23: Bone marrow biopsy at the end of cycle 1 revealed a hypocellular marrow, no blasts, trilineage hypoplasia and dyspoiesis with increased micromegakaryocytes. FISH with trisomy 8 (three copies of [id number]). NGS with ASXL1 (20%), EZH2 (40%), IDH2 (17%), STAG2 (38%).  9/6/23: Bone marrow biopsy revealed dysplastic changes but blasts were not increased. Diploid karyotype. NGS with ASXL1 (16%).    History of Present Ilness:   Guillaume Salinas (Guillaume) is a pleasant 68 y.o.male with PVD, CAD, HTN who presents for follow up. He was recently admitted in [place] with neutropenic fever, encephalopathy secondary to pneumonia, and GI bleeding. He had an EGD which revealed esophagitis. Biopsies were obtained but results not available. He continues to have semi-formed and dark stools, although not fully loose like before. He is not taking a PPI.     PAST MEDICAL HISTORY:   Past Medical History:   Diagnosis Date    Anticoagulant long-term use      Coronary artery disease     Hypertension     Peripheral vascular disease, unspecified        PAST SURGICAL HISTORY:   Past Surgical History:   Procedure Laterality Date    BONE MARROW BIOPSY Left 2023    Procedure: Biopsy-bone marrow;  Surgeon: Harry Diamond MD;  Location: [facility] OR;  Service: Oncology;  Laterality: Left;    COLONOSCOPY N/A 2022    Procedure: COLONOSCOPY Golytely Vaccinated will bring cards;  Surgeon: Dereje Simon MD;  Location: [facility] ENDO;  Service: Endoscopy;  Laterality: N/A;  Do not cancel this order       PAST SOCIAL HISTORY:  Social History     Tobacco Use    Smoking status: Former     Current packs/day: 0.00     Average packs/day: 0.3 packs/day for 50.0 years (12.5 ttl pk-yrs)     Types: Cigarettes     Start date: 3/1/1973     Quit date: 3/1/2023     Years since quittin.1    Smokeless tobacco: Never   Substance Use Topics    Alcohol use: Not Currently    Drug use: Never       FAMILY HISTORY:  Family History   Problem Relation Name Age of Onset    Hypertension Mother      Cancer Father      Cancer Brother 9     No Known Problems Daughter      No Known Problems Daughter      No Known Problems Son         CURRENT MEDICATIONS:   Current Outpatient Medications   Medication Sig Dispense Refill    acyclovir (ZOVIRAX) 400 MG tablet Take 1 tablet (400 mg total) by mouth 2 (two) times daily. 60 tablet 11    aspirin (ECOTRIN) 81 MG EC tablet Take 1 tablet (81 mg total) by mouth once daily. 30 tablet 12    atorvastatin (LIPITOR) 80 MG tablet Take 1 tablet (80 mg total) by mouth once daily. 90 tablet 3    carvediloL (COREG) 6.25 MG tablet Take 1 tablet (6.25 mg total) by mouth 2 (two) times daily. 180 tablet 3    cilostazoL (PLETAL) 50 MG Tab Take 1 tablet (50 mg total) by mouth 2 (two) times daily. 180 tablet 3    cyanocobalamin (VITAMIN B-12) 1000 MCG tablet Take 2 tablets (2,000 mcg total) by mouth once daily. 180 tablet 3    docusate sodium (COLACE) 100 MG capsule Take 100 mg  by mouth 2 (two) times daily as needed for Constipation.      gabapentin (NEURONTIN) 300 MG capsule Take 1 capsule (300 mg total) by mouth 3 (three) times daily. 90 capsule 11    levoFLOXacin (LEVAQUIN) 500 MG tablet Take 1 tablet (500 mg total) by mouth once daily. 30 tablet 11    pantoprazole (PROTONIX) 40 MG tablet Take 1 tablet (40 mg total) by mouth once daily. 30 tablet 3    promethazine (PHENERGAN) 25 MG tablet Take 1 tablet (25 mg total) by mouth every 8 (eight) hours as needed for Nausea. 40 tablet 5    venetoclax (VENCLEXTA) 100 mg Tab Take 1 tablet (100 mg) by mouth once daily on days 1-7 of a 28-day cycle. Do not discard any remaining tablets. 28 tablet 11    voriconazole (VFEND) 200 MG Tab Take 1 tablet (200 mg total) by mouth 2 (two) times daily. 60 tablet 11    zolpidem (AMBIEN) 10 mg Tab TAKE 1 TABLET(10 MG) BY MOUTH EVERY NIGHT AS NEEDED 30 tablet 0     No current facility-administered medications for this visit.       ALLERGIES:   Review of patient's allergies indicates:  No Known Allergies    Review of Systems:     Pertinent positives and negatives included in the HPI. Otherwise a complete review of systems is negative.    Physical Exam:     Vitals:    04/15/24 1306   BP: 130/62   Pulse: 107   Temp: 98.4 °F (36.9 °C)     General: Appears well, NAD  Pulmonary: CTAB, no increased work of breathing, no W/R/C  Cardiovascular: S1S2 normal, RRR, no M/R/G  Abdominal: Soft, NT, ND, BS+, no HSM  Extremities: No C/C/E  Neurological: AAOx4, grossly normal, no focal deficits  Dermatologic: No appreciable rashes or lesions     ECOG Performance Status: (foot note - ECOG PS provided by Eastern Cooperative Oncology Group) 1 - Symptomatic but completely ambulatory    Karnofsky Performance Score:  80%- Normal Activity with Effort: Some Symptoms of Disease    Labs:   Lab Results   Component Value Date    WBC 1.95 (LL) 04/18/2024    RBC 2.63 (L) 04/18/2024    HGB 9.2 (L) 04/18/2024    HCT 26.0 (L) 04/18/2024    MCV  99 (H) 04/18/2024    MCH 35.0 (H) 04/18/2024    MCHC 35.4 04/18/2024    RDW 18.9 (H) 04/18/2024    PLT 20 (LL) 04/18/2024    MPV 10.9 04/18/2024    GRAN 13.0 (L) 04/18/2024    LYMPH 83.0 (H) 04/18/2024    MONO 1.0 (L) 04/18/2024    EOS 0.0 04/15/2024    BASO 0.02 04/15/2024    EOSINOPHIL 2.0 04/18/2024    BASOPHIL 1.0 04/18/2024       CMP  Sodium   Date Value Ref Range Status   04/18/2024 139 136 - 145 mmol/L Final     Potassium   Date Value Ref Range Status   04/18/2024 3.9 3.5 - 5.1 mmol/L Final     Chloride   Date Value Ref Range Status   04/18/2024 104 95 - 110 mmol/L Final     CO2   Date Value Ref Range Status   04/18/2024 22 (L) 23 - 29 mmol/L Final     Glucose   Date Value Ref Range Status   04/18/2024 107 70 - 110 mg/dL Final     BUN   Date Value Ref Range Status   04/18/2024 18 8 - 23 mg/dL Final     Creatinine   Date Value Ref Range Status   04/18/2024 1.0 0.5 - 1.4 mg/dL Final     Calcium   Date Value Ref Range Status   04/18/2024 9.3 8.7 - 10.5 mg/dL Final     Total Protein   Date Value Ref Range Status   04/18/2024 7.0 6.0 - 8.4 g/dL Final     Albumin   Date Value Ref Range Status   04/18/2024 3.9 3.5 - 5.2 g/dL Final     Total Bilirubin   Date Value Ref Range Status   04/18/2024 0.4 0.1 - 1.0 mg/dL Final     Comment:     For infants and newborns, interpretation of results should be based  on gestational age, weight and in agreement with clinical  observations.    Premature Infant recommended reference ranges:  Up to 24 hours.............<8.0 mg/dL  Up to 48 hours............<12.0 mg/dL  3-5 days..................<15.0 mg/dL  6-29 days.................<15.0 mg/dL       Alkaline Phosphatase   Date Value Ref Range Status   04/18/2024 90 55 - 135 U/L Final     AST   Date Value Ref Range Status   04/18/2024 18 10 - 40 U/L Final     ALT   Date Value Ref Range Status   04/18/2024 22 10 - 44 U/L Final     Anion Gap   Date Value Ref Range Status   04/18/2024 13 8 - 16 mmol/L Final     eGFR if     Date Value Ref Range Status   08/27/2021 >60 >60 mL/min/1.73 m^2 Final   08/27/2021 >60 >60 mL/min/1.73 m^2 Final   08/27/2021 >60 >60 mL/min/1.73 m^2 Final     eGFR if non    Date Value Ref Range Status   08/27/2021 57 (A) >60 mL/min/1.73 m^2 Final     Comment:     Calculation used to obtain the estimated glomerular filtration  rate (eGFR) is the CKD-EPI equation.      08/27/2021 57 (A) >60 mL/min/1.73 m^2 Final     Comment:     Calculation used to obtain the estimated glomerular filtration  rate (eGFR) is the CKD-EPI equation.      08/27/2021 57 (A) >60 mL/min/1.73 m^2 Final     Comment:     Calculation used to obtain the estimated glomerular filtration  rate (eGFR) is the CKD-EPI equation.          Imaging:   No results found for this or any previous visit (from the past 2160 hour(s)).    Pathology:  Reviewed     Assessment and Plan:   Guillaume Salinas (Guillaume) is a pleasant 68 y.o.male with PVD, CAD, HTN who presents for follow up.    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 NGS with ASXL1 (44%), EZH2 (87%), IDH2 (42%), STAG2 (89%), U2AF1 (3%). He started treatment with azacitidine 75 mg/m2 daily x7 days plus venetoclax 100mg (voriconazole) daily x14 of 28 days. Venetoclax decreased to 7 days with cycle 3. Bone marrow biopsy 9/6/23 revealed dysplastic changes but blasts were not increased. Diploid karyotype. NGS with ASXL1 (16%). Over the past couple of weeks he has had worsening cytopenias.   Azacitidine x5 days plus venetoclax x7 - currently on hold.   Continue to hold aza/nilda until biopsy results are available.     Stem cell transplant candidate: We discussed the role for allogeneic stem cell transplantation in this disease process as a potentially curative option. We had an extensive discussion about the rationale, logistics, risks, and benefits. We reviewed the requirement to stay in the [place] area for  100 days with a caregiver at all times. We discussed the risks, including infection, graft failure, organ toxicity, graft versus host disease, relapse of disease, and secondary cancers. We reviewed the need for long-term immunosuppression and need for close monitoring. HCT-CI of 1 (intermediate risk). Will move forward with transplant using son (pending DSAs) as the donor. Tentative plans for transplant in May/June 2024.  Coordinator: Fay Stephens  Regimen:  + 2Gy TBI  Donor: son (haplo)  Graft source: BM    Esophagitis: He had biopsies of the esophagus in [place]. He continues to have loose/dark stools, but not likely before. Will restart PPI and refer to GI for evaluation. Will need GI eval prior to transplant.     Symptomatic anemia: Related to MDS. Twice weekly monitoring in [place]. Transfuse for Hgb <7.    Thrombocytopenia: Related to MDS. Monitor and transfuse for Plts <10.    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     Orders/Follow Up:      Orders Placed This Encounter    Bone marrow    Leukemia/Lymphoma Screen - Bone Marrow Right Posterior Iliac Crest    Chromosome Analysis, Bone Marrow Right Posterior Iliac Crest    Myelodysplastic Syndrome (MDS), FISH, Bone Marrow    OncoHeme (NGS) Hematologic Neoplasms, BM Diagnosis or Indication for test: Myelodysplastic syndrome    Heme Disorders DNA/RNA Hold, Bone Marrow    Ambulatory referral/consult to Gastroenterology    Specimen to Pathology, Bone Marrow Aspiration/Biopsy    pantoprazole (PROTONIX) 40 MG tablet       Route Chart for Scheduling    BMT Chart Routing      Follow up with physician . As scheduled   Follow up with [facility]    Provider visit type    Infusion scheduling note    Injection scheduling note    Labs    Imaging    Pharmacy appointment    Other referrals       Additional referrals needed  GI eval ASAP           Treatment Plan Information   OP AZACITIDINE 7-DAY (SUB-Q)   Harry ROGERS  MD Lobo   Upcoming Treatment Dates - OP AZACITIDINE 7-DAY (SUB-Q)    4/22/2024       Pre-Medications       ondansetron tablet 16 mg       Chemotherapy       azaCITIDine (VIDAZA) chemo injection 140 mg  4/23/2024       Pre-Medications       ondansetron tablet 16 mg       Chemotherapy       azaCITIDine (VIDAZA) chemo injection 140 mg  4/24/2024       Pre-Medications       ondansetron tablet 16 mg       Chemotherapy       azaCITIDine (VIDAZA) chemo injection 140 mg  4/25/2024       Pre-Medications       ondansetron tablet 16 mg       Chemotherapy       azaCITIDine (VIDAZA) chemo injection 140 mg    Therapy Plan Information  cyanocobalamin injection 1,000 mcg  1,000 mcg, Intramuscular, Every visit      Total time of this visit was 40 minutes, including time spent face to face with patient, and also in preparing for today's visit for MDM and documentation. (Medical Decision Making, including consideration of possible diagnoses, management options, complex medical record review, review of diagnostic tests and information, consideration and discussion of significant complications based on comorbidities, and discussion with providers involved with the care of the patient). Greater than 50% was spent face to face with the patient counseling and coordinating care.    Paco Hickey MD  Hematology, Oncology, and Stem Cell Transplantation  [facility]

## 2024-04-17 NOTE — PLAN OF CARE
Patient tolerated 1u pRBCs well. VSS throughout transfusion. Patient without complaints. AVS given to patient and all questions answered. PIV removed and patient ambulated out of clinic independently.

## 2024-04-18 ENCOUNTER — LAB VISIT (OUTPATIENT)
Dept: LAB | Facility: HOSPITAL | Age: 69
End: 2024-04-18
Attending: INTERNAL MEDICINE
Payer: MEDICARE

## 2024-04-18 ENCOUNTER — DOCUMENTATION ONLY (OUTPATIENT)
Dept: HEMATOLOGY/ONCOLOGY | Facility: CLINIC | Age: 69
End: 2024-04-18
Payer: MEDICARE

## 2024-04-18 ENCOUNTER — OFFICE VISIT (OUTPATIENT)
Dept: HEMATOLOGY/ONCOLOGY | Facility: CLINIC | Age: 69
End: 2024-04-18
Payer: MEDICARE

## 2024-04-18 VITALS
OXYGEN SATURATION: 98 % | DIASTOLIC BLOOD PRESSURE: 66 MMHG | HEART RATE: 100 BPM | WEIGHT: 167.75 LBS | BODY MASS INDEX: 24.78 KG/M2 | SYSTOLIC BLOOD PRESSURE: 111 MMHG

## 2024-04-18 DIAGNOSIS — D84.81 IMMUNODEFICIENCY SECONDARY TO NEOPLASM: ICD-10-CM

## 2024-04-18 DIAGNOSIS — T45.1X5A CHEMOTHERAPY-INDUCED NEUTROPENIA: ICD-10-CM

## 2024-04-18 DIAGNOSIS — D64.9 SYMPTOMATIC ANEMIA: ICD-10-CM

## 2024-04-18 DIAGNOSIS — D61.818 PANCYTOPENIA: ICD-10-CM

## 2024-04-18 DIAGNOSIS — D69.59 SECONDARY THROMBOCYTOPENIA: ICD-10-CM

## 2024-04-18 DIAGNOSIS — D84.821 IMMUNODEFICIENCY DUE TO DRUG THERAPY: ICD-10-CM

## 2024-04-18 DIAGNOSIS — T45.1X5A CHEMOTHERAPY-INDUCED NAUSEA AND VOMITING: ICD-10-CM

## 2024-04-18 DIAGNOSIS — D49.9 IMMUNODEFICIENCY SECONDARY TO NEOPLASM: ICD-10-CM

## 2024-04-18 DIAGNOSIS — Z79.899 IMMUNODEFICIENCY DUE TO DRUG THERAPY: ICD-10-CM

## 2024-04-18 DIAGNOSIS — D70.1 CHEMOTHERAPY-INDUCED NEUTROPENIA: ICD-10-CM

## 2024-04-18 DIAGNOSIS — I70.0 AORTIC ATHEROSCLEROSIS: ICD-10-CM

## 2024-04-18 DIAGNOSIS — D64.9 ANEMIA, UNSPECIFIED TYPE: ICD-10-CM

## 2024-04-18 DIAGNOSIS — R11.2 CHEMOTHERAPY-INDUCED NAUSEA AND VOMITING: ICD-10-CM

## 2024-04-18 DIAGNOSIS — D46.9 MYELODYSPLASTIC SYNDROME: ICD-10-CM

## 2024-04-18 DIAGNOSIS — D46.9 MYELODYSPLASTIC SYNDROME: Primary | ICD-10-CM

## 2024-04-18 LAB
ABO + RH BLD: NORMAL
ALBUMIN SERPL BCP-MCNC: 3.9 G/DL (ref 3.5–5.2)
ALP SERPL-CCNC: 90 U/L (ref 55–135)
ALT SERPL W/O P-5'-P-CCNC: 22 U/L (ref 10–44)
ANION GAP SERPL CALC-SCNC: 13 MMOL/L (ref 8–16)
ANISOCYTOSIS BLD QL SMEAR: SLIGHT
AST SERPL-CCNC: 18 U/L (ref 10–40)
BASOPHILS NFR BLD: 1 % (ref 0–1.9)
BILIRUB SERPL-MCNC: 0.4 MG/DL (ref 0.1–1)
BLD GP AB SCN CELLS X3 SERPL QL: NORMAL
BUN SERPL-MCNC: 18 MG/DL (ref 8–23)
CALCIUM SERPL-MCNC: 9.3 MG/DL (ref 8.7–10.5)
CHLORIDE SERPL-SCNC: 104 MMOL/L (ref 95–110)
CO2 SERPL-SCNC: 22 MMOL/L (ref 23–29)
CREAT SERPL-MCNC: 1 MG/DL (ref 0.5–1.4)
DIFFERENTIAL METHOD BLD: ABNORMAL
EOSINOPHIL NFR BLD: 2 % (ref 0–8)
ERYTHROCYTE [DISTWIDTH] IN BLOOD BY AUTOMATED COUNT: 18.9 % (ref 11.5–14.5)
EST. GFR  (NO RACE VARIABLE): >60 ML/MIN/1.73 M^2
FERRITIN SERPL-MCNC: 2462 NG/ML (ref 20–300)
GLUCOSE SERPL-MCNC: 107 MG/DL (ref 70–110)
HCT VFR BLD AUTO: 26 % (ref 40–54)
HGB BLD-MCNC: 9.2 G/DL (ref 14–18)
HYPOCHROMIA BLD QL SMEAR: ABNORMAL
IMM GRANULOCYTES # BLD AUTO: ABNORMAL K/UL (ref 0–0.04)
IMM GRANULOCYTES NFR BLD AUTO: ABNORMAL % (ref 0–0.5)
IRON SERPL-MCNC: 267 UG/DL (ref 45–160)
LYMPHOCYTES NFR BLD: 83 % (ref 18–48)
MCH RBC QN AUTO: 35 PG (ref 27–31)
MCHC RBC AUTO-ENTMCNC: 35.4 G/DL (ref 32–36)
MCV RBC AUTO: 99 FL (ref 82–98)
MONOCYTES NFR BLD: 1 % (ref 4–15)
NEUTROPHILS NFR BLD: 13 % (ref 38–73)
NRBC BLD-RTO: 0 /100 WBC
PLATELET # BLD AUTO: 20 K/UL (ref 150–450)
PLATELET BLD QL SMEAR: ABNORMAL
PMV BLD AUTO: 10.9 FL (ref 9.2–12.9)
POIKILOCYTOSIS BLD QL SMEAR: ABNORMAL
POTASSIUM SERPL-SCNC: 3.9 MMOL/L (ref 3.5–5.1)
PROT SERPL-MCNC: 7 G/DL (ref 6–8.4)
RBC # BLD AUTO: 2.63 M/UL (ref 4.6–6.2)
SATURATED IRON: 91 % (ref 20–50)
SODIUM SERPL-SCNC: 139 MMOL/L (ref 136–145)
SPECIMEN OUTDATE: NORMAL
TOTAL IRON BINDING CAPACITY: 292 UG/DL (ref 250–450)
TRANSFERRIN SERPL-MCNC: 197 MG/DL (ref 200–375)
URATE SERPL-MCNC: 5.3 MG/DL (ref 3.4–7)
WBC # BLD AUTO: 1.95 K/UL (ref 3.9–12.7)

## 2024-04-18 PROCEDURE — 80053 COMPREHEN METABOLIC PANEL: CPT | Performed by: INTERNAL MEDICINE

## 2024-04-18 PROCEDURE — 99999 PR PBB SHADOW E&M-EST. PATIENT-LVL III: CPT | Mod: PBBFAC,,, | Performed by: INTERNAL MEDICINE

## 2024-04-18 PROCEDURE — 84550 ASSAY OF BLOOD/URIC ACID: CPT | Mod: NBTX | Performed by: INTERNAL MEDICINE

## 2024-04-18 PROCEDURE — 83540 ASSAY OF IRON: CPT | Mod: NBTX | Performed by: INTERNAL MEDICINE

## 2024-04-18 PROCEDURE — 82728 ASSAY OF FERRITIN: CPT | Performed by: INTERNAL MEDICINE

## 2024-04-18 PROCEDURE — 86901 BLOOD TYPING SEROLOGIC RH(D): CPT | Performed by: INTERNAL MEDICINE

## 2024-04-18 PROCEDURE — 85007 BL SMEAR W/DIFF WBC COUNT: CPT | Performed by: INTERNAL MEDICINE

## 2024-04-18 PROCEDURE — 85027 COMPLETE CBC AUTOMATED: CPT | Mod: NBTX | Performed by: INTERNAL MEDICINE

## 2024-04-18 NOTE — Clinical Note
Good morning,  I just saw him. Sounds like he had a rough hospitalization in March in [place]. He received blood yesterday. Today, his platelet count is 20 k/uL. He says you have him scheduled for bone marrow biopsy on 4/23/24. He was scheduled for azacitidine on Monday, but I'm not sure whether to hold it or not due to his thrombocytopenia. What are your thoughts?  Thanks!

## 2024-04-19 ENCOUNTER — TELEPHONE (OUTPATIENT)
Dept: INFUSION THERAPY | Facility: HOSPITAL | Age: 69
End: 2024-04-19
Payer: MEDICARE

## 2024-04-19 NOTE — TELEPHONE ENCOUNTER
Notified Mrs. Cortez that Dr. Daimond would like to cancel Mr. Galaviz's Vidaza injections due to low blood counts. Mrs. Cortez voiced understanding. She asked about the bone marrow biopsy scheduled for Tuesday. Told pt's wife that I was unsure of that answer but would reach out to Dr. Hickey's team and someone would follow up with her on Monday.

## 2024-04-22 ENCOUNTER — TELEPHONE (OUTPATIENT)
Dept: FAMILY MEDICINE | Facility: HOSPITAL | Age: 69
End: 2024-04-22
Payer: MEDICARE

## 2024-04-22 ENCOUNTER — LAB VISIT (OUTPATIENT)
Dept: LAB | Facility: HOSPITAL | Age: 69
End: 2024-04-22
Attending: INTERNAL MEDICINE
Payer: MEDICARE

## 2024-04-22 ENCOUNTER — DOCUMENTATION ONLY (OUTPATIENT)
Dept: HEMATOLOGY/ONCOLOGY | Facility: CLINIC | Age: 69
End: 2024-04-22
Payer: MEDICARE

## 2024-04-22 DIAGNOSIS — D46.9 MYELODYSPLASTIC SYNDROME: ICD-10-CM

## 2024-04-22 DIAGNOSIS — I73.9 CLAUDICATION: Primary | ICD-10-CM

## 2024-04-22 LAB
ABO + RH BLD: NORMAL
ANISOCYTOSIS BLD QL SMEAR: SLIGHT
BASOPHILS NFR BLD: 3 % (ref 0–1.9)
BLD GP AB SCN CELLS X3 SERPL QL: NORMAL
DIFFERENTIAL METHOD BLD: ABNORMAL
EOSINOPHIL NFR BLD: 3 % (ref 0–8)
ERYTHROCYTE [DISTWIDTH] IN BLOOD BY AUTOMATED COUNT: 17.5 % (ref 11.5–14.5)
HCT VFR BLD AUTO: 26.5 % (ref 40–54)
HGB BLD-MCNC: 9.1 G/DL (ref 14–18)
HYPOCHROMIA BLD QL SMEAR: ABNORMAL
IMM GRANULOCYTES # BLD AUTO: ABNORMAL K/UL (ref 0–0.04)
IMM GRANULOCYTES NFR BLD AUTO: ABNORMAL % (ref 0–0.5)
LDH SERPL L TO P-CCNC: 143 U/L (ref 110–260)
LYMPHOCYTES NFR BLD: 63 % (ref 18–48)
MCH RBC QN AUTO: 34.6 PG (ref 27–31)
MCHC RBC AUTO-ENTMCNC: 34.3 G/DL (ref 32–36)
MCV RBC AUTO: 101 FL (ref 82–98)
MONOCYTES NFR BLD: 2 % (ref 4–15)
NEUTROPHILS NFR BLD: 29 % (ref 38–73)
NRBC BLD-RTO: 0 /100 WBC
OVALOCYTES BLD QL SMEAR: ABNORMAL
PLATELET # BLD AUTO: 22 K/UL (ref 150–450)
PLATELET BLD QL SMEAR: ABNORMAL
PMV BLD AUTO: 11.7 FL (ref 9.2–12.9)
POIKILOCYTOSIS BLD QL SMEAR: SLIGHT
RBC # BLD AUTO: 2.63 M/UL (ref 4.6–6.2)
SPECIMEN OUTDATE: NORMAL
WBC # BLD AUTO: 2.22 K/UL (ref 3.9–12.7)

## 2024-04-22 PROCEDURE — 36415 COLL VENOUS BLD VENIPUNCTURE: CPT | Performed by: INTERNAL MEDICINE

## 2024-04-22 PROCEDURE — 85027 COMPLETE CBC AUTOMATED: CPT | Performed by: INTERNAL MEDICINE

## 2024-04-22 PROCEDURE — 85007 BL SMEAR W/DIFF WBC COUNT: CPT | Performed by: INTERNAL MEDICINE

## 2024-04-22 PROCEDURE — 86901 BLOOD TYPING SEROLOGIC RH(D): CPT | Performed by: INTERNAL MEDICINE

## 2024-04-22 PROCEDURE — 83615 LACTATE (LD) (LDH) ENZYME: CPT | Performed by: INTERNAL MEDICINE

## 2024-04-22 RX ORDER — CILOSTAZOL 50 MG/1
50 TABLET ORAL 2 TIMES DAILY
Qty: 180 TABLET | Refills: 3 | Status: SHIPPED | OUTPATIENT
Start: 2024-04-22 | End: 2024-06-06

## 2024-04-23 ENCOUNTER — PROCEDURE VISIT (OUTPATIENT)
Dept: HEMATOLOGY/ONCOLOGY | Facility: CLINIC | Age: 69
End: 2024-04-23
Payer: MEDICARE

## 2024-04-23 ENCOUNTER — LAB VISIT (OUTPATIENT)
Dept: LAB | Facility: HOSPITAL | Age: 69
End: 2024-04-23
Payer: MEDICARE

## 2024-04-23 VITALS
SYSTOLIC BLOOD PRESSURE: 124 MMHG | TEMPERATURE: 98 F | HEART RATE: 78 BPM | OXYGEN SATURATION: 98 % | RESPIRATION RATE: 18 BRPM | DIASTOLIC BLOOD PRESSURE: 59 MMHG

## 2024-04-23 DIAGNOSIS — D46.9 MYELODYSPLASTIC SYNDROME: ICD-10-CM

## 2024-04-23 DIAGNOSIS — Z76.82 STEM CELL TRANSPLANT CANDIDATE: ICD-10-CM

## 2024-04-23 DIAGNOSIS — D46.9 MYELODYSPLASTIC SYNDROME: Primary | ICD-10-CM

## 2024-04-23 LAB
ABO + RH BLD: NORMAL
ALBUMIN SERPL BCP-MCNC: 3.7 G/DL (ref 3.5–5.2)
ALP SERPL-CCNC: 95 U/L (ref 55–135)
ALT SERPL W/O P-5'-P-CCNC: 16 U/L (ref 10–44)
ANION GAP SERPL CALC-SCNC: 10 MMOL/L (ref 8–16)
ANISOCYTOSIS BLD QL SMEAR: SLIGHT
APTT PPP: 27.6 SEC (ref 21–32)
AST SERPL-CCNC: 16 U/L (ref 10–40)
BASOPHILS # BLD AUTO: 0.04 K/UL (ref 0–0.2)
BASOPHILS NFR BLD: 2.1 % (ref 0–1.9)
BILIRUB SERPL-MCNC: 0.2 MG/DL (ref 0.1–1)
BLD GP AB SCN CELLS X3 SERPL QL: NORMAL
BUN SERPL-MCNC: 14 MG/DL (ref 8–23)
CALCIUM SERPL-MCNC: 9.6 MG/DL (ref 8.7–10.5)
CHLORIDE SERPL-SCNC: 107 MMOL/L (ref 95–110)
CO2 SERPL-SCNC: 26 MMOL/L (ref 23–29)
CREAT SERPL-MCNC: 1 MG/DL (ref 0.5–1.4)
DIFFERENTIAL METHOD BLD: ABNORMAL
EOSINOPHIL # BLD AUTO: 0 K/UL (ref 0–0.5)
EOSINOPHIL NFR BLD: 1.6 % (ref 0–8)
ERYTHROCYTE [DISTWIDTH] IN BLOOD BY AUTOMATED COUNT: 17.7 % (ref 11.5–14.5)
EST. GFR  (NO RACE VARIABLE): >60 ML/MIN/1.73 M^2
GLUCOSE SERPL-MCNC: 132 MG/DL (ref 70–110)
HCT VFR BLD AUTO: 27.8 % (ref 40–54)
HGB BLD-MCNC: 9.3 G/DL (ref 14–18)
HGB S BLD QL SOLY: NEGATIVE
IMM GRANULOCYTES # BLD AUTO: 0.01 K/UL (ref 0–0.04)
IMM GRANULOCYTES NFR BLD AUTO: 0.5 % (ref 0–0.5)
INR PPP: 1 (ref 0.8–1.2)
LYMPHOCYTES # BLD AUTO: 1.3 K/UL (ref 1–4.8)
LYMPHOCYTES NFR BLD: 68.9 % (ref 18–48)
MAGNESIUM SERPL-MCNC: 2.1 MG/DL (ref 1.6–2.6)
MCH RBC QN AUTO: 35 PG (ref 27–31)
MCHC RBC AUTO-ENTMCNC: 33.5 G/DL (ref 32–36)
MCV RBC AUTO: 105 FL (ref 82–98)
MONOCYTES # BLD AUTO: 0.1 K/UL (ref 0.3–1)
MONOCYTES NFR BLD: 3.7 % (ref 4–15)
NEUTROPHILS # BLD AUTO: 0.4 K/UL (ref 1.8–7.7)
NEUTROPHILS NFR BLD: 23.2 % (ref 38–73)
NRBC BLD-RTO: 0 /100 WBC
OVALOCYTES BLD QL SMEAR: ABNORMAL
PHOSPHATE SERPL-MCNC: 3.8 MG/DL (ref 2.7–4.5)
PLATELET # BLD AUTO: 23 K/UL (ref 150–450)
PLATELET BLD QL SMEAR: ABNORMAL
PMV BLD AUTO: 11.4 FL (ref 9.2–12.9)
POIKILOCYTOSIS BLD QL SMEAR: SLIGHT
POTASSIUM SERPL-SCNC: 3.5 MMOL/L (ref 3.5–5.1)
PROT SERPL-MCNC: 7 G/DL (ref 6–8.4)
PROTHROMBIN TIME: 11.1 SEC (ref 9–12.5)
RBC # BLD AUTO: 2.66 M/UL (ref 4.6–6.2)
SODIUM SERPL-SCNC: 143 MMOL/L (ref 136–145)
SPECIMEN OUTDATE: NORMAL
WBC # BLD AUTO: 1.9 K/UL (ref 3.9–12.7)

## 2024-04-23 PROCEDURE — 82955 ASSAY OF G6PD ENZYME: CPT | Performed by: INTERNAL MEDICINE

## 2024-04-23 PROCEDURE — 86787 VARICELLA-ZOSTER ANTIBODY: CPT | Mod: BTX | Performed by: INTERNAL MEDICINE

## 2024-04-23 PROCEDURE — 86480 TB TEST CELL IMMUN MEASURE: CPT | Performed by: INTERNAL MEDICINE

## 2024-04-23 PROCEDURE — 85610 PROTHROMBIN TIME: CPT | Mod: BTX | Performed by: INTERNAL MEDICINE

## 2024-04-23 PROCEDURE — 86803 HEPATITIS C AB TEST: CPT | Performed by: INTERNAL MEDICINE

## 2024-04-23 PROCEDURE — 86687 HTLV-I ANTIBODY: CPT | Performed by: INTERNAL MEDICINE

## 2024-04-23 PROCEDURE — 88275 CYTOGENETICS 100-300: CPT | Mod: 59 | Performed by: NURSE PRACTITIONER

## 2024-04-23 PROCEDURE — 83735 ASSAY OF MAGNESIUM: CPT | Mod: BTX | Performed by: INTERNAL MEDICINE

## 2024-04-23 PROCEDURE — 88311 DECALCIFY TISSUE: CPT | Mod: BTX | Performed by: PATHOLOGY

## 2024-04-23 PROCEDURE — 88185 FLOWCYTOMETRY/TC ADD-ON: CPT | Performed by: PATHOLOGY

## 2024-04-23 PROCEDURE — 86703 HIV-1/HIV-2 1 RESULT ANTBDY: CPT | Mod: BTX | Performed by: INTERNAL MEDICINE

## 2024-04-23 PROCEDURE — 88237 TISSUE CULTURE BONE MARROW: CPT | Performed by: INTERNAL MEDICINE

## 2024-04-23 PROCEDURE — 36415 COLL VENOUS BLD VENIPUNCTURE: CPT | Performed by: INTERNAL MEDICINE

## 2024-04-23 PROCEDURE — 86704 HEP B CORE ANTIBODY TOTAL: CPT | Performed by: INTERNAL MEDICINE

## 2024-04-23 PROCEDURE — 81450 HL NEO GSAP 5-50DNA/DNA&RNA: CPT | Performed by: INTERNAL MEDICINE

## 2024-04-23 PROCEDURE — 84100 ASSAY OF PHOSPHORUS: CPT | Mod: BTX | Performed by: INTERNAL MEDICINE

## 2024-04-23 PROCEDURE — 87799 DETECT AGENT NOS DNA QUANT: CPT | Mod: BTX | Performed by: INTERNAL MEDICINE

## 2024-04-23 PROCEDURE — 86592 SYPHILIS TEST NON-TREP QUAL: CPT | Mod: BTX | Performed by: INTERNAL MEDICINE

## 2024-04-23 PROCEDURE — 81265 STR MARKERS SPECIMEN ANAL: CPT | Mod: BTX | Performed by: INTERNAL MEDICINE

## 2024-04-23 PROCEDURE — 86644 CMV ANTIBODY: CPT | Performed by: INTERNAL MEDICINE

## 2024-04-23 PROCEDURE — 88305 TISSUE EXAM BY PATHOLOGIST: CPT | Mod: BTX | Performed by: PATHOLOGY

## 2024-04-23 PROCEDURE — 87340 HEPATITIS B SURFACE AG IA: CPT | Performed by: INTERNAL MEDICINE

## 2024-04-23 PROCEDURE — 88313 SPECIAL STAINS GROUP 2: CPT | Mod: BTX | Performed by: PATHOLOGY

## 2024-04-23 PROCEDURE — 86696 HERPES SIMPLEX TYPE 2 TEST: CPT | Mod: BTX | Performed by: INTERNAL MEDICINE

## 2024-04-23 PROCEDURE — 88299 UNLISTED CYTOGENETIC STUDY: CPT | Performed by: INTERNAL MEDICINE

## 2024-04-23 PROCEDURE — 88264 CHROMOSOME ANALYSIS 20-25: CPT | Performed by: INTERNAL MEDICINE

## 2024-04-23 PROCEDURE — 80307 DRUG TEST PRSMV CHEM ANLYZR: CPT | Mod: BTX | Performed by: INTERNAL MEDICINE

## 2024-04-23 PROCEDURE — 88184 FLOWCYTOMETRY/ TC 1 MARKER: CPT | Mod: BTX | Performed by: PATHOLOGY

## 2024-04-23 PROCEDURE — 80053 COMPREHEN METABOLIC PANEL: CPT | Performed by: INTERNAL MEDICINE

## 2024-04-23 PROCEDURE — 88342 IMHCHEM/IMCYTCHM 1ST ANTB: CPT | Mod: 59,BTX | Performed by: PATHOLOGY

## 2024-04-23 PROCEDURE — 85730 THROMBOPLASTIN TIME PARTIAL: CPT | Performed by: INTERNAL MEDICINE

## 2024-04-23 PROCEDURE — 86850 RBC ANTIBODY SCREEN: CPT | Mod: BTX | Performed by: INTERNAL MEDICINE

## 2024-04-23 PROCEDURE — 88341 IMHCHEM/IMCYTCHM EA ADD ANTB: CPT | Mod: BTX | Performed by: PATHOLOGY

## 2024-04-23 PROCEDURE — 85660 RBC SICKLE CELL TEST: CPT | Performed by: INTERNAL MEDICINE

## 2024-04-23 PROCEDURE — 86682 HELMINTH ANTIBODY: CPT | Performed by: INTERNAL MEDICINE

## 2024-04-23 PROCEDURE — 85025 COMPLETE CBC W/AUTO DIFF WBC: CPT | Performed by: INTERNAL MEDICINE

## 2024-04-23 RX ORDER — LIDOCAINE HYDROCHLORIDE 20 MG/ML
8 INJECTION, SOLUTION INFILTRATION; PERINEURAL
Status: COMPLETED | OUTPATIENT
Start: 2024-04-23 | End: 2024-04-23

## 2024-04-23 RX ADMIN — LIDOCAINE HYDROCHLORIDE 8 ML: 20 INJECTION, SOLUTION INFILTRATION; PERINEURAL at 10:04

## 2024-04-23 NOTE — PROCEDURES
Bone marrow    Date/Time: 4/23/2024 10:00 AM    Performed by: Judy Anthony NP  Authorized by: Mohit Hickey MD    Consent Done?: Yes (Written)   Immediately prior to procedure a time out was called to verify the correct patient, procedure, equipment, support staff and site/side marked as required. .      Position: prone  Aspiration?: Yes   Biopsy?: Yes        PROCEDURE NOTE:  Date of Procedure: 04/23/2024  Bone Marrow Biopsy and Aspiration  Indication: MDS; pre allo SCT  Consent: Informed consent was obtained from patient.  Timeout: Done and documented. Allergies reviewed.  Position: prone  Site: Right posterior illiac crest.  Prep: Betadine.  Needle used: 11 gauge Jamshidi needle.  Anesthetic: 2% lidocaine 8 cc.  Biopsy: The biopsy needle was introduced into the marrow cavity and an aspirate was obtained without complications and sent for flow cytometry, AML fish, NGS, DNA/RNA hold, and cytogenetics. Core biopsy obtained without difficulty and sent for routine histologic examination.  Complications: None.  Disposition: The patient was placed supine for 15min following procedure. MA to assess bandaid for bleeding prior to discharge home. Patient aware to keep bandaid dry and intact for 24hrs and to call clinic for any bleeding, fevers, pain, or signs of infection.  Blood loss: Minimal.     Judy Anthony NP  Hematology/Oncology/BMT      Patient offered  but declined; wife was present during consent and procedure to translate

## 2024-04-24 LAB
CMV AB TOTAL, DONOR EVAL (BLOOD CENTER): REACTIVE
EPSTEIN-BARR VIRUS DNA: NORMAL
EPSTEIN-BARR VIRUS PCR, QUANT: NOT DETECTED IU/ML
G6PD RBC-CCNT: NORMAL
GAMMA INTERFERON BACKGROUND BLD IA-ACNC: 0 IU/ML
HEPATITIS B CORE  AB, DONOR EVAL, (BLOOD CENTER): NORMAL
HEPATITIS B SURFACE AG, DONOR EVAL, (BLOOD CENTER): NORMAL
HEPATITIS C ANTIBODY,DONOR EVAL (BLOOD CENTER): NORMAL
HIV1/2 AG/AB, DONOR EVAL (BLOOD CENTER): NORMAL
HSV1 IGG SERPL QL IA: POSITIVE
HSV2 IGG SERPL QL IA: POSITIVE
HTLV I/II ANTIBODY, DONOR EVAL (BLOOD EVAL): NORMAL
M TB IFN-G CD4+ BCKGRND COR BLD-ACNC: 0 IU/ML
M TB IFN-G CD4+ BCKGRND COR BLD-ACNC: 0.01 IU/ML
MITOGEN IGNF BCKGRD COR BLD-ACNC: 10 IU/ML
RPR, DONOR EVAL (BLOOD CENTER): NORMAL
TB GOLD PLUS: NEGATIVE
VARICELLA INTERPRETATION: POSITIVE
VARICELLA ZOSTER IGG: 3478

## 2024-04-25 DIAGNOSIS — Z76.82 STEM CELL TRANSPLANT CANDIDATE: Primary | ICD-10-CM

## 2024-04-25 DIAGNOSIS — D46.9 MYELODYSPLASTIC SYNDROME: ICD-10-CM

## 2024-04-25 LAB
AML FISH REASON FOR REFERRAL (BM): NORMAL
AMPHETAMINES SERPL QL: NEGATIVE
ANNOTATION COMMENT IMP: NORMAL
BARBITURATES SERPL QL SCN: NEGATIVE
BENZODIAZ SERPL QL SCN: NEGATIVE
BZE SERPL QL: NEGATIVE
CARBOXYTHC SERPL QL SCN: NEGATIVE
CELLS W CYTOGENETIC ABNL BLD/T: NORMAL
CHROM ANALY RESULT (ISCN): NORMAL
CLINICAL CYTOGENETICIST REVIEW: NORMAL
DNA/RNA EXTRACT AND HOLD RESULT: NORMAL
DNA/RNA EXTRACTION: NORMAL
ETHANOL SERPL QL SCN: NEGATIVE
EXHR SPECIMEN TYPE: NORMAL
LAB TEST METHOD: NORMAL
METHADONE SERPL QL SCN: NEGATIVE
MOL DX INTERP BLD/T QL: NORMAL
OPIATES SERPL QL SCN: NEGATIVE
PCP SERPL QL SCN: NEGATIVE
PROPOXYPH SERPL QL: NEGATIVE
PROVIDER SIGNING NAME: NORMAL
SPECIMEN SOURCE: NORMAL
STRONGYLOIDES ANTIBODY IGG: NEGATIVE
TEST PERFORMANCE INFO SPEC: NORMAL

## 2024-04-26 ENCOUNTER — PATIENT MESSAGE (OUTPATIENT)
Dept: SURGERY | Facility: CLINIC | Age: 69
End: 2024-04-26
Payer: MEDICARE

## 2024-04-26 DIAGNOSIS — Z76.82 STEM CELL TRANSPLANT CANDIDATE: Primary | ICD-10-CM

## 2024-04-29 ENCOUNTER — LAB VISIT (OUTPATIENT)
Dept: LAB | Facility: HOSPITAL | Age: 69
End: 2024-04-29
Attending: INTERNAL MEDICINE
Payer: MEDICARE

## 2024-04-29 DIAGNOSIS — D46.9 MYELODYSPLASTIC SYNDROME: ICD-10-CM

## 2024-04-29 DIAGNOSIS — Z76.82 STEM CELL TRANSPLANT CANDIDATE: ICD-10-CM

## 2024-04-29 LAB
ABO + RH BLD: NORMAL
ANISOCYTOSIS BLD QL SMEAR: SLIGHT
BASOPHILS NFR BLD: 4 % (ref 0–1.9)
BLD GP AB SCN CELLS X3 SERPL QL: NORMAL
DIFFERENTIAL METHOD BLD: ABNORMAL
EOSINOPHIL NFR BLD: 0 % (ref 0–8)
ERYTHROCYTE [DISTWIDTH] IN BLOOD BY AUTOMATED COUNT: 17.6 % (ref 11.5–14.5)
HCT VFR BLD AUTO: 23 % (ref 40–54)
HGB BLD-MCNC: 7.9 G/DL (ref 14–18)
HYPOCHROMIA BLD QL SMEAR: ABNORMAL
IMM GRANULOCYTES # BLD AUTO: ABNORMAL K/UL (ref 0–0.04)
IMM GRANULOCYTES NFR BLD AUTO: ABNORMAL % (ref 0–0.5)
LYMPHOCYTES NFR BLD: 56 % (ref 18–48)
MCH RBC QN AUTO: 34.6 PG (ref 27–31)
MCHC RBC AUTO-ENTMCNC: 34.3 G/DL (ref 32–36)
MCV RBC AUTO: 101 FL (ref 82–98)
MONOCYTES NFR BLD: 0 % (ref 4–15)
NEUTROPHILS NFR BLD: 38 % (ref 38–73)
NEUTS BAND NFR BLD MANUAL: 2 %
NRBC BLD-RTO: 0 /100 WBC
PLATELET # BLD AUTO: 23 K/UL (ref 150–450)
PLATELET BLD QL SMEAR: ABNORMAL
PMV BLD AUTO: 11.9 FL (ref 9.2–12.9)
POIKILOCYTOSIS BLD QL SMEAR: SLIGHT
RBC # BLD AUTO: 2.28 M/UL (ref 4.6–6.2)
SPECIMEN OUTDATE: NORMAL
WBC # BLD AUTO: 2.63 K/UL (ref 3.9–12.7)

## 2024-04-29 PROCEDURE — 85025 COMPLETE CBC W/AUTO DIFF WBC: CPT | Mod: BTX | Performed by: INTERNAL MEDICINE

## 2024-04-29 PROCEDURE — 82955 ASSAY OF G6PD ENZYME: CPT | Mod: BTX | Performed by: INTERNAL MEDICINE

## 2024-04-29 PROCEDURE — 86901 BLOOD TYPING SEROLOGIC RH(D): CPT | Mod: BTX | Performed by: INTERNAL MEDICINE

## 2024-04-29 NOTE — PROGRESS NOTES
"Oncology Nutrition - Evaluation and Education Note    uGillaume Salinas  1955    Referring Provider: No ref. provider found   Diagnosis: MDS  Treatment: OP AZACITIDINE 7-DAY (SUB-Q); Allogenic Candidate    PMHx:   Past Medical History:   Diagnosis Date    Anticoagulant long-term use     Coronary artery disease     Hypertension     Peripheral vascular disease, unspecified      Allergies: Patient has no known allergies.    Nutrition Assessment    Guillaume Salinas is a  68 y.o. male with MDS being seen for nutrition evaluation and education prior to Allogenic SCT. Patient presented to clinic with his wife. His preferred language is [place] therefore a Lithuanian-English  (Geovanni #[id number]) was utilized during today's visit. Today, 4/30/24, Mr. Salinas reports decreased appetite/intake as well as weight loss of 6 lbs in 1 month since recent hospitalization (early April 2024) for GI bleed. Additionally, he reports underlining nausea with Levaquin. He only takes antiemetic when absolutely necessary.     Anthropometrics:   Weight:   Wt Readings from Last 10 Encounters:   04/18/24 76.1 kg (167 lb 12.3 oz)   04/17/24 76.9 kg (169 lb 6.8 oz)   04/15/24 76.9 kg (169 lb 6.8 oz)   03/08/24 78.7 kg (173 lb 8 oz)   03/07/24 78.7 kg (173 lb 8 oz)   03/06/24 78.7 kg (173 lb 8 oz)   03/05/24 78.7 kg (173 lb 8 oz)   03/04/24 78.7 kg (173 lb 8 oz)   03/04/24 77.7 kg (171 lb 4.8 oz)   03/01/24 77.7 kg (171 lb 4.8 oz)     Usual BW: 167-170 lb.  Ht Readings from Last 1 Encounters:   04/17/24 5' 9" (1.753 m)      BMI Readings from Last 1 Encounters:   04/18/24 24.78 kg/m²            Appetite/Intake: Regular Diet, Eating 2-3 meals daily. Decreased from usual. Occasionally snacking on fruit or jello or Gatorade. No ONS.     Client History/Food Access:  Cultural/Spiritual/Personal Preferences: No Preferences    Living Situation: Lives with spouse  Who: Shops for Groceries? Patient  Who: Prepare meals? Spouse  Eating " Scheduled pt 1/31 as ordered. Informed pt of appointment date and time. Pt would like to know if she should come fasting the morning of 1/31. Please advise. Thank you.    out: 1-2x/week.    Current Medications:    Current Outpatient Medications:     acyclovir (ZOVIRAX) 400 MG tablet, Take 1 tablet (400 mg total) by mouth 2 (two) times daily., Disp: 60 tablet, Rfl: 11    aspirin (ECOTRIN) 81 MG EC tablet, Take 1 tablet (81 mg total) by mouth once daily., Disp: 30 tablet, Rfl: 12    atorvastatin (LIPITOR) 80 MG tablet, Take 1 tablet (80 mg total) by mouth once daily., Disp: 90 tablet, Rfl: 3    carvediloL (COREG) 6.25 MG tablet, Take 1 tablet (6.25 mg total) by mouth 2 (two) times daily., Disp: 180 tablet, Rfl: 3    cilostazoL (PLETAL) 50 MG Tab, Take 1 tablet (50 mg total) by mouth 2 (two) times daily., Disp: 180 tablet, Rfl: 3    cyanocobalamin (VITAMIN B-12) 1000 MCG tablet, Take 2 tablets (2,000 mcg total) by mouth once daily., Disp: 180 tablet, Rfl: 3    docusate sodium (COLACE) 100 MG capsule, Take 100 mg by mouth 2 (two) times daily as needed for Constipation., Disp: , Rfl:     gabapentin (NEURONTIN) 300 MG capsule, Take 1 capsule (300 mg total) by mouth 3 (three) times daily., Disp: 90 capsule, Rfl: 11    levoFLOXacin (LEVAQUIN) 500 MG tablet, Take 1 tablet (500 mg total) by mouth once daily., Disp: 30 tablet, Rfl: 11    pantoprazole (PROTONIX) 40 MG tablet, Take 1 tablet (40 mg total) by mouth once daily., Disp: 30 tablet, Rfl: 3    promethazine (PHENERGAN) 25 MG tablet, Take 1 tablet (25 mg total) by mouth every 8 (eight) hours as needed for Nausea., Disp: 40 tablet, Rfl: 5    venetoclax (VENCLEXTA) 100 mg Tab, Take 1 tablet (100 mg) by mouth once daily on days 1-7 of a 28-day cycle. Do not discard any remaining tablets., Disp: 28 tablet, Rfl: 11    voriconazole (VFEND) 200 MG Tab, Take 1 tablet (200 mg total) by mouth 2 (two) times daily., Disp: 60 tablet, Rfl: 11    zolpidem (AMBIEN) 10 mg Tab, TAKE 1 TABLET(10 MG) BY MOUTH EVERY NIGHT AS NEEDED, Disp: 30 tablet, Rfl: 0  No current facility-administered medications for this visit.    Vitamins/Supplements: None    Labs:  Reviewed 4/23/24: gluc 132    Recommendations:   Review details of [facility] Nutrition Guide review during visit   Encouraged safe food practices.  Encouraged use of antiemetic scheduled to manage nausea better.   Suggest Boost Original or Ensure Original once daily to aid is weight maintenance and increase to twice daily if weight is not stable.     Materials Provided/Reviewed: None. [facility] Nutrition Guide in English only.  Barriers to Learning: none identified  Patient and/or Family Verbalizes understanding: yes     Nutrition Monitoring and Evaluation    Follow up: upon RTC post SCT.    Communication to referring provider/care team: Note available in chart.    Consultation Time: 30 Minutes     Rony PALMAP, , LDN  [facility] in Clinical Nutrition  Board Certified Specialist in Oncology Nutrition   [facility]  [phone]

## 2024-04-30 ENCOUNTER — HOSPITAL ENCOUNTER (OUTPATIENT)
Dept: PULMONOLOGY | Facility: CLINIC | Age: 69
Discharge: HOME | End: 2024-04-30
Payer: MEDICARE

## 2024-04-30 ENCOUNTER — HOSPITAL ENCOUNTER (OUTPATIENT)
Dept: CARDIOLOGY | Facility: CLINIC | Age: 69
Discharge: HOME | End: 2024-04-30
Payer: MEDICARE

## 2024-04-30 ENCOUNTER — HOSPITAL ENCOUNTER (OUTPATIENT)
Dept: RADIOLOGY | Facility: HOSPITAL | Age: 69
Discharge: HOME | End: 2024-04-30
Attending: INTERNAL MEDICINE
Payer: MEDICARE

## 2024-04-30 ENCOUNTER — CLINICAL SUPPORT (OUTPATIENT)
Dept: HEMATOLOGY/ONCOLOGY | Facility: CLINIC | Age: 69
End: 2024-04-30
Payer: MEDICARE

## 2024-04-30 ENCOUNTER — HOSPITAL ENCOUNTER (OUTPATIENT)
Dept: CARDIOLOGY | Facility: HOSPITAL | Age: 69
Discharge: HOME | End: 2024-04-30
Attending: INTERNAL MEDICINE
Payer: MEDICARE

## 2024-04-30 VITALS
HEART RATE: 63 BPM | BODY MASS INDEX: 24.73 KG/M2 | HEIGHT: 69 IN | SYSTOLIC BLOOD PRESSURE: 130 MMHG | WEIGHT: 167 LBS | DIASTOLIC BLOOD PRESSURE: 80 MMHG

## 2024-04-30 DIAGNOSIS — Z76.82 STEM CELL TRANSPLANT CANDIDATE: ICD-10-CM

## 2024-04-30 DIAGNOSIS — D46.9 MYELODYSPLASTIC SYNDROME: ICD-10-CM

## 2024-04-30 DIAGNOSIS — Z71.3 NUTRITIONAL COUNSELING: Primary | ICD-10-CM

## 2024-04-30 DIAGNOSIS — D46.9 MYELODYSPLASTIC SYNDROME: Primary | ICD-10-CM

## 2024-04-30 LAB
ASCENDING AORTA: 3.37 CM
AV INDEX (PROSTH): 0.86
AV MEAN GRADIENT: 4 MMHG
AV PEAK GRADIENT: 7 MMHG
AV VALVE AREA BY VELOCITY RATIO: 2.43 CM²
AV VALVE AREA: 2.22 CM²
AV VELOCITY RATIO: 0.93
BSA FOR ECHO PROCEDURE: 1.92 M2
CV ECHO LV RWT: 0.39 CM
DLCO ADJ PRE: 13.63 ML/(MIN*MMHG) (ref 18.96–32.82)
DLCO SINGLE BREATH LLN: 18.96
DLCO SINGLE BREATH PRE REF: 39 %
DLCO SINGLE BREATH REF: 25.89
DLCOC SBVA LLN: 2.63
DLCOC SBVA PRE REF: 75.4 %
DLCOC SBVA REF: 3.85
DLCOC SINGLE BREATH LLN: 18.96
DLCOC SINGLE BREATH PRE REF: 52.7 %
DLCOC SINGLE BREATH REF: 25.89
DLCOCSBVAULN: 5.08
DLCOCSINGLEBREATHULN: 32.82
DLCOSINGLEBREATHULN: 32.82
DLCOVA LLN: 2.63
DLCOVA PRE REF: 55.9 %
DLCOVA PRE: 2.15 ML/(MIN*MMHG*L) (ref 2.63–5.08)
DLCOVA REF: 3.85
DLCOVAULN: 5.08
DLVAADJ PRE: 2.9 ML/(MIN*MMHG*L) (ref 2.63–5.08)
DOP CALC AO PEAK VEL: 1.36 M/S
DOP CALC AO VTI: 24.53 CM
DOP CALC LVOT AREA: 2.6 CM2
DOP CALC LVOT DIAMETER: 1.82 CM
DOP CALC LVOT PEAK VEL: 1.27 M/S
DOP CALC LVOT STROKE VOLUME: 54.58 CM3
DOP CALCLVOT PEAK VEL VTI: 20.99 CM
E WAVE DECELERATION TIME: 194.55 MSEC
E/A RATIO: 0.67
E/E' RATIO: 7.11 M/S
ECHO LV POSTERIOR WALL: 0.94 CM (ref 0.6–1.1)
FEF 25 75 LLN: 1.06
FEF 25 75 PRE REF: 81.6 %
FEF 25 75 REF: 2.39
FEV05 LLN: 1.35
FEV05 REF: 2.49
FEV1 FVC LLN: 63
FEV1 FVC PRE REF: 99.8 %
FEV1 FVC REF: 77
FEV1 LLN: 2.22
FEV1 PRE REF: 81.8 %
FEV1 REF: 3.06
FRACTIONAL SHORTENING: 32 % (ref 28–44)
FVC LLN: 2.99
FVC PRE REF: 81.7 %
FVC REF: 4.01
INTERVENTRICULAR SEPTUM: 0.99 CM (ref 0.6–1.1)
IVC PRE: 2.92 L (ref 2.99–5.05)
IVC SINGLE BREATH LLN: 2.99
IVC SINGLE BREATH PRE REF: 72.7 %
IVC SINGLE BREATH REF: 4.01
IVCSINGLEBREATHULN: 5.05
LA MAJOR: 3.98 CM
LA MINOR: 4.58 CM
LA WIDTH: 3.38 CM
LEFT ATRIUM SIZE: 2.83 CM
LEFT ATRIUM VOLUME INDEX MOD: 16.2 ML/M2
LEFT ATRIUM VOLUME INDEX: 18.1 ML/M2
LEFT ATRIUM VOLUME MOD: 30.85 CM3
LEFT ATRIUM VOLUME: 34.63 CM3
LEFT INTERNAL DIMENSION IN SYSTOLE: 3.24 CM (ref 2.1–4)
LEFT VENTRICLE DIASTOLIC VOLUME INDEX: 55.51 ML/M2
LEFT VENTRICLE DIASTOLIC VOLUME: 106.02 ML
LEFT VENTRICLE MASS INDEX: 84 G/M2
LEFT VENTRICLE SYSTOLIC VOLUME INDEX: 22.2 ML/M2
LEFT VENTRICLE SYSTOLIC VOLUME: 42.34 ML
LEFT VENTRICULAR INTERNAL DIMENSION IN DIASTOLE: 4.77 CM (ref 3.5–6)
LEFT VENTRICULAR MASS: 160.54 G
LV LATERAL E/E' RATIO: 6.4 M/S
LV SEPTAL E/E' RATIO: 8 M/S
MV PEAK A VEL: 0.95 M/S
MV PEAK E VEL: 0.64 M/S
MV STENOSIS PRESSURE HALF TIME: 56.42 MS
MV VALVE AREA P 1/2 METHOD: 3.9 CM2
OHS CV RV/LV RATIO: 0.62 CM
OHS QRS DURATION: 78 MS
OHS QTC CALCULATION: 428 MS
PEF LLN: 5.55
PEF PRE REF: 115.4 %
PEF REF: 8.06
PHYSICIAN COMMENT: ABNORMAL
PISA TR MAX VEL: 2.22 M/S
PRE DLCO: 10.11 ML/(MIN*MMHG) (ref 18.96–32.82)
PRE FEF 25 75: 1.95 L/S (ref 1.06–4.26)
PRE FET 100: 7.33 SEC
PRE FEV05 REF: 82.6 %
PRE FEV1 FVC: 76.38 % (ref 63.26–88.23)
PRE FEV1: 2.5 L (ref 2.22–3.84)
PRE FEV5: 2.06 L (ref 1.35–3.62)
PRE FVC: 3.28 L (ref 2.99–5.05)
PRE PEF: 9.3 L/S (ref 5.55–10.57)
RA MAJOR: 4.29 CM
RA PRESSURE ESTIMATED: 3 MMHG
RA WIDTH: 3.7 CM
RIGHT VENTRICULAR END-DIASTOLIC DIMENSION: 2.97 CM
RV TB RVSP: 5 MMHG
SINUS: 3.12 CM
STJ: 2.79 CM
TDI LATERAL: 0.1 M/S
TDI SEPTAL: 0.08 M/S
TDI: 0.09 M/S
TR MAX PG: 20 MMHG
TRICUSPID ANNULAR PLANE SYSTOLIC EXCURSION: 2.46 CM
TV REST PULMONARY ARTERY PRESSURE: 23 MMHG
VA PRE: 4.69 L (ref 6.57–6.57)
VA SINGLE BREATH LLN: 6.57
VA SINGLE BREATH PRE REF: 71.4 %
VA SINGLE BREATH REF: 6.57
VASINGLEBREATHULN: 6.57
Z-SCORE OF LEFT VENTRICULAR DIMENSION IN END DIASTOLE: -1.19
Z-SCORE OF LEFT VENTRICULAR DIMENSION IN END SYSTOLE: -0.17

## 2024-04-30 PROCEDURE — 99999 PR PBB SHADOW E&M-EST. PATIENT-LVL II: CPT | Mod: PBBFAC,,,

## 2024-04-30 PROCEDURE — 93306 TTE W/DOPPLER COMPLETE: CPT | Mod: BTX

## 2024-04-30 PROCEDURE — 71046 X-RAY EXAM CHEST 2 VIEWS: CPT | Mod: TC,FY

## 2024-04-30 NOTE — PROGRESS NOTES
BMT Pharmacist Evaluation    Current Outpatient Medications:     acyclovir (ZOVIRAX) 400 MG tablet, Take 1 tablet (400 mg total) by mouth 2 (two) times daily., Disp: 60 tablet, Rfl: 11    carvediloL (COREG) 6.25 MG tablet, Take 1 tablet (6.25 mg total) by mouth 2 (two) times daily., Disp: 180 tablet, Rfl: 3    cilostazoL (PLETAL) 50 MG Tab, Take 1 tablet (50 mg total) by mouth 2 (two) times daily., Disp: 180 tablet, Rfl: 3    gabapentin (NEURONTIN) 300 MG capsule, Take 1 capsule (300 mg total) by mouth 3 (three) times daily., Disp: 90 capsule, Rfl: 11    levoFLOXacin (LEVAQUIN) 500 MG tablet, Take 1 tablet (500 mg total) by mouth once daily., Disp: 30 tablet, Rfl: 11    pantoprazole (PROTONIX) 40 MG tablet, Take 1 tablet (40 mg total) by mouth once daily., Disp: 30 tablet, Rfl: 3    promethazine (PHENERGAN) 25 MG tablet, Take 1 tablet (25 mg total) by mouth every 8 (eight) hours as needed for Nausea., Disp: 40 tablet, Rfl: 5    venetoclax (VENCLEXTA) 100 mg Tab, Take 1 tablet (100 mg) by mouth once daily on days 1-7 of a 28-day cycle. Do not discard any remaining tablets., Disp: 28 tablet, Rfl: 11    voriconazole (VFEND) 200 MG Tab, Take 1 tablet (200 mg total) by mouth 2 (two) times daily., Disp: 60 tablet, Rfl: 11    zolpidem (AMBIEN) 10 mg Tab, TAKE 1 TABLET(10 MG) BY MOUTH EVERY NIGHT AS NEEDED, Disp: 30 tablet, Rfl: 0  No current facility-administered medications for this visit.      Review of patient's allergies indicates:  No Known Allergies      CrCl cannot be calculated (Patient's most recent lab result is older than the maximum 7 days allowed.).       Medication adherence: uses pill box and phone alarms  Medication-related problems: none     Planned conditioning regimen:  Fludarabine on Day -5, -4, -3, and -2  Melphalan on Day -2  TBI on Day -1     Planned  GVHD Prophylaxis:  Cyclophosphamide (with [place]) on Days +3 and +4  Mycophenolate starting on Day +5  Tacrolimus starting on Day +5    Antimicrobial  Prophylaxis:  Acyclovir starting on Day -5  Levofloxacin starting on Day -1  Micafungin on Day -1 through Day +4  Letermovir starting on Day +5 (if CMV PCR drawn on day 0 results negative)  Posaconazole starting on Day +5  Bactrim starting on Day +30    SOS/VOD Prophylaxis:  Ursodiol on Day -5 through Day +30     Growth Factor Support:  Neupogen starting on Day +5     Caregiver: wife   Post-transplant discharge plans: home     Notes:  Reviewed and reconciled the medication list with the patient and wife. Patient was able to confirm all medications he currently takes including schedule and indication. Patient demonstrates excellent medication adherence and understands the importance of this through the transplant process.     Reviewed the planned high-dose chemotherapy regimen, including schedule and possible side effects. Provided the patient with drug information handouts for chemotherapy and GVHD prophylaxis. Reviewed possible side effects of transplant including: neutropenia, thrombocytopenia, anemia, infection, infusion reactions, rash, nausea/vomiting, mucositis, loss of appetite, diarrhea, neuropathy, hair loss, bladder irritation, liver and/or renal dysfunction. Also discussed the rare side effects of neurotoxicity (ranging from confusion to seizures), hemolytic anemia, sinusoidal obstruction syndrome (SOS/VOD), and hyponatremia (SIADH). Reviewed prophylactic antimicrobials, as well as prophylactic and as needed antiemetics. Encouraged the patient to report all possible side effects/new symptoms and to ask for supportive care medications if needed. Patient verbalized understanding and all questions were answered.    Pharmacy Notes:   - Explained that we will change his antifungal from voriconazole to posaconazole during transplant and that he will need to remain on this as long as he is on tacrolimus.    - He has experience with promethazine for nausea/vomiting. I did discuss that we will utilize other  medications during his transplant due to side effects of promethazine.    - Cilostazol is an anti-platelet and will need to be held once platelets are low. See drug interaction below as when this medication is restarted in clinic the tacrolimus will need closer monitoring.    - No history of shingles, invasive fungal infections, or CDiff.    - Patient understands that he will need to take a higher dose of acyclovir for one year post-transplant and start taking Bactrim at day +30 and continue until off immunosuppression.     Drug Interactions: cilostazol may increase tacrolimus concentrations; starting dose of tacrolimus has already been adjusted for posaconazole use and levels will be monitored during admission for transplant       Proposed recommendations:  The patient demonstrates good medication adherence and understanding of the chemotherapy and transplant plan. BMT/Hematology Oncology PharmD will continue to follow the patient while admitted to the inpatient unit.       Sanjuana Poe, Pharm.D., [facility]  Clinical Pharmacy Specialist, Bone Marrow Transplant/Cellular Therapy  [facility]  SpectraLink: [phone]

## 2024-05-01 ENCOUNTER — DOCUMENTATION ONLY (OUTPATIENT)
Dept: HEMATOLOGY/ONCOLOGY | Facility: CLINIC | Age: 69
End: 2024-05-01
Payer: MEDICARE

## 2024-05-01 LAB
CHIMERISM  DONOR: NORMAL
CHIMERISM-RECIPIENT GERMLINE: NORMAL
G6PD RBC-CCNT: 11.5 U/G HB (ref 8–11.9)
SPECIMEN TYPE: NORMAL

## 2024-05-01 NOTE — PROGRESS NOTES
[facility]   Bone Marrow Transplant Psychosocial Assessment   Date: 2024       Demographic Information     Name: Guillaume Salinas    : 1955    Age: 68 y.o.    Sex: male    Race: White    Marital Status:    SS #:    Phone Number(s): [phone] (home)     Home Address: [address] [place], [place] [id number]    Mailing Address: [address] [place] [address]    Are you a [place] Citizen? No   If no, please explain: Permeant Resident       Contact Information     Next of Kin: Yennifer Thomas   Relationship: Rebel   Phone Number(s): [phone]   Emergency Contact: Extended Emergency Contact Information  Primary Emergency Contact: ThomasAngelaYennifer  Mobile Phone: [phone]  Relation: Spouse  Preferred language: English   needed? No  Secondary Emergency Contact: Susy Cordova  Mobile Phone: [phone]  Relation: Daughter        Living Arrangements   Household Composition:  Patient currently resides with: Spouse   If patient resides with spouse, please explain the marital relationship: The couple is .   Does the patient currently own his/her own home? Yes   Does the patient currently rent home/apartment: No   Are current living arrangements permanent? No   If no, please explain: N/A       Children's Names     Name Sex Age   Segundo Meyer  Male 44   2.   Jennifer Meyer Female 39   3.   Susy Meyer Female 36   4.     5.       Who will be the primary caregiver for the children when patient is admitted to the hospital?    Name: Yennifer Thomas   Phone Number:  [phone]       Support System   Primary Caregiver:     Name: Yennifer Thomas   Relationship: Spouse   Cell #: [phone]   Address: [address] [place] [address]   Home #: [phone]   City: [facility]: [place]   ZIP: [id number]       Secondary Caregiver:     Name: Jose Alberto Thomas   Relationship: Brother in law   Cell #: [phone]   Address: [address] [place] [address]    Home #: [phone]   City: [place]   Street: [place]   ZIP: [id number]     Will patient's caregiver be available full-time? Yes   What is the patient's Pentecostal? Jewish   Is patient currently practicing or non-practicing? Yes      Does patient have any other sources for support? Yes      If yes, please explain: Family and Friends       Post BMT Plans      Does patient have full understanding of recovery from BMT? Yes   Does patient understand risk associated with BMT? Yes   What are patient's housing plans post BMT? Own home   Does patient have a Living Will? No   Does patient have a Power of ?  No   If yes, please give name of POA:  Name: N/A    Phone #: N/A       Employment Information     Is patient currently employed? No   Employer: Retired   Phone #: N/A   Position: N/A   Full Time/Part Time? N/A   YRS: N/A       Secondary Employment Information     Employer: N/A   Phone #: N/A   Position: NA   Full Time/Part Time? N/A   YRS: N/A       Significant Other Employment Information     Employer: None   Phone #: N/A   Position: N/A   Full Time/Part Time? N/A   YRS: N/A         Financial Information     Monthly Income: $800.00   Yearly Income: $9,600.00   Source of Income:  Social Security   Do you have any financial concerns? Yes   If yes, please explain:  Copays and prescription costs       Insurance Information      Do you have health insurance?  Yes   Insurance [facility] [facility]   Policy #: [id number]   Group #: [id number]   Policy Guzmán: Brad Meyer Romulo   Medicare: Yes   Medicare Part D: No   Medicaid: No   Do you have Disability Insurance? No      Do you have a Cancer Policy? No   Do you have medication/prescription coverage? Yes   Are you a ? No       Medical Information     Diagnosis: Myelodysplastic, (MDS)   Date of Diagnosis: 05/12/2023   Is this a new diagnosis? Yes         If no, please explain: N/A   Past Medical History: N/A   Infusion Services: None   Home Health: None    Durable Medical Equipment: N/A   Activities of Daily Living: N/A   Patient's Family Cancer History: Cancer-related family history includes Cancer in his brother and father.       Cognitive Functioning     Cognitive State: Alert   Does patient have any concerns that may affect medical follow up and full understanding of treatment? No   Does patient have any concerns that may impact medication compliance? No   Education Level: college graduate   Does the patient have any learning disabilities? No   If yes, please explain: N/A   Can the patient read English? No   Can the patient write in English? No      Is the patient Literate? Yes   What is the patient's primary language? Faroese   Does the patient need interpretation services? Yes        Psychosocial History     Does patient have any emotional issues? No   If, yes please explain:     Does patient have a psychiatric history? No   If, yes please explain:  N/A   Is patient currently taking any psychiatric medication? No   If yes, please list medications: N/A   Is patient currently in therapy or attending support groups? No   Where is the patient currently in therapy? N/A   Therapist/Counselor Name: N/A   Therapist/Counselor Phone #: N/A       Alcohol/Drug Use/Abuse History     Alcohol Use: Social History     Substance and Sexual Activity   Alcohol Use Not Currently      Tobacco Use: Social History     Tobacco Use   Smoking Status Former    Current packs/day: 0.00    Average packs/day: 0.3 packs/day for 50.0 years (12.5 ttl pk-yrs)    Types: Cigarettes    Start date: 3/1/1973    Quit date: 3/1/2023    Years since quittin.1   Smokeless Tobacco Never      Drug Use: Social History     Substance and Sexual Activity   Drug Use Never          Coping Skills     How is the patient currently coping with their diagnosis? The patient stated that his coping mechanism is being around his spouse and two grand children.   Is the patient open/receptive to psychosocial  intervention? Yes   Has the patient experienced any significant losses in his/her life? Yes      If yes, please explain: The patient stated that his sister-in-law passed two years ago, the family supported her until she passed.   What are the patient's identified needs? The patient stated, I'm concerned about copays and medication costs.   Goals: The patient stated, I want this treatment will help me to recover, as I will leave my recovery in the doctors hands.   Interview Behavior: The patient was calm and cooperative during the assessment.   Suitability for Transplant: The patient is suitable for transplant by his willingness to follow the recommendations from his physians during his treatment, and out patient care.   Additional Comments: The patient and his spouse/caregiver are prepared to start treatment. The patient will require an interrupter during all phases of treatment.

## 2024-05-02 ENCOUNTER — TELEPHONE (OUTPATIENT)
Dept: TRANSPLANT | Facility: CLINIC | Age: 69
End: 2024-05-02
Payer: MEDICARE

## 2024-05-02 DIAGNOSIS — Z76.82 STEM CELL TRANSPLANT CANDIDATE: ICD-10-CM

## 2024-05-02 DIAGNOSIS — M79.2 NEUROPATHIC PAIN: ICD-10-CM

## 2024-05-02 DIAGNOSIS — Z76.82 STEM CELL TRANSPLANT CANDIDATE: Primary | ICD-10-CM

## 2024-05-02 DIAGNOSIS — D46.9 MYELODYSPLASTIC SYNDROME: ICD-10-CM

## 2024-05-02 DIAGNOSIS — D46.9 MYELODYSPLASTIC SYNDROME: Primary | ICD-10-CM

## 2024-05-02 RX ORDER — GABAPENTIN 300 MG/1
300 CAPSULE ORAL 3 TIMES DAILY
Qty: 90 CAPSULE | Refills: 11 | Status: SHIPPED | OUTPATIENT
Start: 2024-05-02 | End: 2024-05-28 | Stop reason: SDUPTHER

## 2024-05-02 NOTE — TELEPHONE ENCOUNTER
----- Message from Evelyne Hatch DO sent at 5/2/2024 12:54 PM CDT -----  Regarding: RE: allo stem cell transplant clearance    CT chest non contrast and 6MWT    ----- Message -----  From: Jerica Brand, RN  Sent: 5/2/2024  12:32 PM CDT  To: Evelyne Hatch DO  Subject: FW: allo stem cell transplant clearance          Can you review patient's chart and let me know if he needs any testing with the consult appointment?  ----- Message -----  From: Fay Stephens RN  Sent: 5/2/2024   9:57 AM CDT  To: Jerica Brand RN; Haritha Richardson  Subject: allo stem cell transplant clearance              Good morning    Dr Hickey would like for Mr Salinas to be seen for pulm clearance for his Allo Stem Cell transplant.  He had pulm testing an cxr on 4/30.  We are hoping to have him seen prior to 5/21.  Thank  you     Fay  (His wife speaks english)

## 2024-05-03 ENCOUNTER — OFFICE VISIT (OUTPATIENT)
Dept: RADIATION ONCOLOGY | Facility: CLINIC | Age: 69
End: 2024-05-03
Payer: MEDICARE

## 2024-05-03 ENCOUNTER — HOSPITAL ENCOUNTER (OUTPATIENT)
Dept: RADIATION THERAPY | Facility: HOSPITAL | Age: 69
Discharge: HOME | End: 2024-05-03
Attending: FAMILY MEDICINE
Payer: MEDICARE

## 2024-05-03 VITALS
BODY MASS INDEX: 25.86 KG/M2 | HEART RATE: 95 BPM | WEIGHT: 170.63 LBS | HEIGHT: 68 IN | DIASTOLIC BLOOD PRESSURE: 83 MMHG | OXYGEN SATURATION: 95 % | SYSTOLIC BLOOD PRESSURE: 129 MMHG | TEMPERATURE: 99 F

## 2024-05-03 DIAGNOSIS — D46.22 MYELODYSPLASTIC SYNDROME WITH EXCESS BLASTS-2: Primary | ICD-10-CM

## 2024-05-03 PROCEDURE — 77332 RADIATION TREATMENT AID(S): CPT | Mod: 26,,, | Performed by: STUDENT IN AN ORGANIZED HEALTH CARE EDUCATION/TRAINING PROGRAM

## 2024-05-03 PROCEDURE — 77290 THER RAD SIMULAJ FIELD CPLX: CPT | Mod: TC | Performed by: STUDENT IN AN ORGANIZED HEALTH CARE EDUCATION/TRAINING PROGRAM

## 2024-05-03 PROCEDURE — 77332 RADIATION TREATMENT AID(S): CPT | Mod: TC,BTX | Performed by: STUDENT IN AN ORGANIZED HEALTH CARE EDUCATION/TRAINING PROGRAM

## 2024-05-03 PROCEDURE — 99999 PR PBB SHADOW E&M-EST. PATIENT-LVL III: CPT | Mod: PBBFAC,,, | Performed by: STUDENT IN AN ORGANIZED HEALTH CARE EDUCATION/TRAINING PROGRAM

## 2024-05-03 PROCEDURE — 77290 THER RAD SIMULAJ FIELD CPLX: CPT | Mod: 26,,, | Performed by: STUDENT IN AN ORGANIZED HEALTH CARE EDUCATION/TRAINING PROGRAM

## 2024-05-03 NOTE — PROGRESS NOTES
[facility] Consult Note    Referring provider: Jose M Kruse MD    Assessment:  Guillaume Salinas is a 68 y.o. 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  Hx of GI bleeding, change in stools, has been referred to GI  ECOG: (1) Restricted in physically strenuous activity, ambulatory and able to do work of light nature        Plan:  Will review BMBx with med onc. Will continue for stem cell transplant, with  + 2Gy TBI regimen. TBI was discussed, including both acute and long term toxicity including potential lung, CNS, liver, kidney, bowel toxicity, second malignancy, infertility, hair loss. Questions were answered to be best of my ability and consent was obtained for TBI.   CT sim today    The admit date will be:   5/23/2024     Date of TBI :  5/28/2024     Transplant 5/29/2024.      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 Diamond of anemia. WBC 2.71, Hgb 7.1, Plts  400. Prior to this visit, he was in [place] for a dental procedure. His symptoms of fatigue started after extractions. Workup by Dr. Diamond unremarkable.   4/26/23: Bone marrow biopsy revealed a hypercellular marrow (80-90%) with increased blasts (8-12%) concerning for MDS EB2. However, sample was limited.   5/23/23: Repeat bone marrow biopsy revealed myelodysplastic syndrome with excess blasts 2 (blasts 16-17%). 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 NGS with ASXL1 (44%), EZH2 (87%), IDH2 (42%), STAG2 (89%), U2AF1 (3%).  6/12/23: Cycle 1 of azacitidine 75 mg/m2 plus venetoclax x14 of 28 days.   7/3/23: Bone marrow biopsy at the end of cycle 1 revealed a hypocellular marrow, no blasts, trilineage hypoplasia and dyspoiesis with increased micromegakaryocytes. FISH with trisomy 8 (three copies  of MTHD1N3). NGS with ASXL1 (20%), EZH2 (40%), IDH2 (17%), STAG2 (38%).  23: Bone marrow biopsy revealed dysplastic changes but blasts were not increased. Diploid karyotype. NGS with ASXL1 (16%).  He was started treatment with azacitidine 75 mg/m2 daily x7 days plus venetoclax 100mg (voriconazole) daily x14 of 28 days. Venetoclax decreased to 7 days with cycle 3. Bone marrow biopsy 23 revealed dysplastic changes but blasts were not increased. Diploid karyotype. NGS with ASXL1 (16%).   Currently on hold due to esophagitis  3/4/24: met with BMT, elected to proceed with allogenic stem cell transplant using son as donor using   + 2Gy TBI regimen.    - 24: admitted to OSH with neutropenic fever and GI bleed. Cultures were negative. Underwent EGD with findings of gastritis and esophagitis.   24: BMBx with persistent myeloid neoplasm.       Possibility of pregnancy: N/A  History of prior irradiation: No  History of prior systemic anti-cancer therapy: Yes - only as above  History of collagen vascular disease: No  Implanted electronic device (pacer/defib/nerve stimulator): No     History of Present Illness:  Guillaume Salinas presents today to discuss the role of TBI.     No current GI bleeding. Now having some mucus in the stools. No fevers. Endorses fatigue and general weakness, nausea and diarrhea. No other complaints.     Has PVD, improved on pletal.     Review of Systems:  ROS as above    Social History:  Social History     Tobacco Use    Smoking status: Former     Current packs/day: 0.00     Average packs/day: 0.3 packs/day for 50.0 years (12.5 ttl pk-yrs)     Types: Cigarettes     Start date: 3/1/1973     Quit date: 3/1/2023     Years since quittin.1    Smokeless tobacco: Never   Substance Use Topics    Alcohol use: Not Currently    Drug use: Never       Past Medical History:  Past Medical History:   Diagnosis Date    Anticoagulant long-term use     Coronary artery disease      Hypertension     Peripheral vascular disease, unspecified        Past Surgical History:   Procedure Laterality Date    BONE MARROW BIOPSY Left 4/26/2023    Procedure: Biopsy-bone marrow;  Surgeon: Harry Diamond MD;  Location: [facility] OR;  Service: Oncology;  Laterality: Left;    COLONOSCOPY N/A 01/05/2022    Procedure: COLONOSCOPY Golytely Vaccinated will bring cards;  Surgeon: Dereje Simon MD;  Location: [facility] ENDO;  Service: Endoscopy;  Laterality: N/A;  Do not cancel this order         Medications:  Current Outpatient Medications on File Prior to Visit   Medication Sig Dispense Refill    acyclovir (ZOVIRAX) 400 MG tablet Take 1 tablet (400 mg total) by mouth 2 (two) times daily. 60 tablet 11    carvediloL (COREG) 6.25 MG tablet Take 1 tablet (6.25 mg total) by mouth 2 (two) times daily. 180 tablet 3    cilostazoL (PLETAL) 50 MG Tab Take 1 tablet (50 mg total) by mouth 2 (two) times daily. 180 tablet 3    gabapentin (NEURONTIN) 300 MG capsule Take 1 capsule (300 mg total) by mouth 3 (three) times daily. 90 capsule 11    levoFLOXacin (LEVAQUIN) 500 MG tablet Take 1 tablet (500 mg total) by mouth once daily. 30 tablet 11    pantoprazole (PROTONIX) 40 MG tablet Take 1 tablet (40 mg total) by mouth once daily. 30 tablet 3    promethazine (PHENERGAN) 25 MG tablet Take 1 tablet (25 mg total) by mouth every 8 (eight) hours as needed for Nausea. 40 tablet 5    venetoclax (VENCLEXTA) 100 mg Tab Take 1 tablet (100 mg) by mouth once daily on days 1-7 of a 28-day cycle. Do not discard any remaining tablets. 28 tablet 11    voriconazole (VFEND) 200 MG Tab Take 1 tablet (200 mg total) by mouth 2 (two) times daily. 60 tablet 11    zolpidem (AMBIEN) 10 mg Tab TAKE 1 TABLET(10 MG) BY MOUTH EVERY NIGHT AS NEEDED 30 tablet 0     Current Facility-Administered Medications on File Prior to Visit   Medication Dose Route Frequency Provider Last Rate Last Admin    [DISCONTINUED] GENERIC EXTERNAL MEDICATION     Provider, Generic  "External Data        [DISCONTINUED] GENERIC EXTERNAL MEDICATION     Provider, Generic External Data        [DISCONTINUED] GENERIC EXTERNAL MEDICATION     Provider, Generic External Data        [DISCONTINUED] GENERIC EXTERNAL MEDICATION     Provider, Generic External Data        [DISCONTINUED] GENERIC EXTERNAL MEDICATION     Provider, Generic External Data        [DISCONTINUED] GENERIC EXTERNAL MEDICATION     Provider, Generic External Data           Allergies:  Review of patient's allergies indicates:  No Known Allergies    Exam:  Vitals:    05/03/24 1027   BP: 129/83   BP Location: Left arm   Patient Position: Sitting   BP Method: Large (Automatic)   Pulse: 95   Temp: 98.8 °F (37.1 °C)   SpO2: 95%   Weight: 77.4 kg (170 lb 10.2 oz)   Height: 5' 8" (1.727 m)     Constitutional: Pleasant 68 y.o. male in no acute distress.  Well nourished. Well groomed.   HEENT: Normocephalic and atraumatic   Cardiovascular: Upper extremities warm to touch  Lungs: No audible wheezing.  Normal effort.   Musculoskeletal: No gross MSK deformities. Ambulates well  Skin: No rashes appreciated.  Psych: Alert and oriented with appropriate mood and affect.  Neuro:  Grossly normal.    Data Review:  Information obtained from Guillaume Salinas and via chart review.       GI biopsies    Final Diagnosis    A.  Stomach, biopsy-  Chronic gastritis.  Negative for H pylori ([place]).  Negative for intestinal metaplasia.  Negative for dysplasia and malignancy.     B.  Distal esophagus, biopsy-  Squamous mucosa with focal acute inflammation and atypical cells.  See comment  No columnar mucosa present for evaluation.  Negative for intestinal metaplasia.  Negative for dysplasia and malignancy.           Ganga Rojas MD  Radiation Oncology                          "

## 2024-05-07 ENCOUNTER — PATIENT MESSAGE (OUTPATIENT)
Dept: RADIATION ONCOLOGY | Facility: CLINIC | Age: 69
End: 2024-05-07
Payer: MEDICARE

## 2024-05-08 ENCOUNTER — LAB VISIT (OUTPATIENT)
Dept: LAB | Facility: HOSPITAL | Age: 69
End: 2024-05-08
Attending: INTERNAL MEDICINE
Payer: MEDICARE

## 2024-05-08 ENCOUNTER — PATIENT MESSAGE (OUTPATIENT)
Dept: HEMATOLOGY/ONCOLOGY | Facility: CLINIC | Age: 69
End: 2024-05-08
Payer: MEDICARE

## 2024-05-08 DIAGNOSIS — D46.9 MYELODYSPLASTIC SYNDROME: ICD-10-CM

## 2024-05-08 DIAGNOSIS — D64.9 ANEMIA, UNSPECIFIED TYPE: ICD-10-CM

## 2024-05-08 DIAGNOSIS — D64.9 SYMPTOMATIC ANEMIA: ICD-10-CM

## 2024-05-08 DIAGNOSIS — D64.9 SYMPTOMATIC ANEMIA: Primary | ICD-10-CM

## 2024-05-08 LAB
ABO + RH BLD: NORMAL
ALBUMIN SERPL BCP-MCNC: 3.5 G/DL (ref 3.5–5.2)
ALP SERPL-CCNC: 72 U/L (ref 55–135)
ALT SERPL W/O P-5'-P-CCNC: 11 U/L (ref 10–44)
ANION GAP SERPL CALC-SCNC: 9 MMOL/L (ref 8–16)
ANISOCYTOSIS BLD QL SMEAR: SLIGHT
ANNOTATION COMMENT IMP: NORMAL
AST SERPL-CCNC: 13 U/L (ref 10–40)
BASOPHILS # BLD AUTO: 0.02 K/UL (ref 0–0.2)
BASOPHILS NFR BLD: 1.4 % (ref 0–1.9)
BILIRUB SERPL-MCNC: 0.3 MG/DL (ref 0.1–1)
BLD GP AB SCN CELLS X3 SERPL QL: NORMAL
BUN SERPL-MCNC: 10 MG/DL (ref 8–23)
CALCIUM SERPL-MCNC: 8.9 MG/DL (ref 8.7–10.5)
CHLORIDE SERPL-SCNC: 106 MMOL/L (ref 95–110)
CO2 SERPL-SCNC: 24 MMOL/L (ref 23–29)
COMMENT: NORMAL
CREAT SERPL-MCNC: 1 MG/DL (ref 0.5–1.4)
DIFFERENTIAL METHOD BLD: ABNORMAL
EOSINOPHIL # BLD AUTO: 0.1 K/UL (ref 0–0.5)
EOSINOPHIL NFR BLD: 4.7 % (ref 0–8)
ERYTHROCYTE [DISTWIDTH] IN BLOOD BY AUTOMATED COUNT: 18.7 % (ref 11.5–14.5)
EST. GFR  (NO RACE VARIABLE): >60 ML/MIN/1.73 M^2
FERRITIN SERPL-MCNC: 2180 NG/ML (ref 20–300)
FINAL PATHOLOGIC DIAGNOSIS: NORMAL
GLUCOSE SERPL-MCNC: 94 MG/DL (ref 70–110)
GROSS: NORMAL
HCT VFR BLD AUTO: 20.2 % (ref 40–54)
HGB BLD-MCNC: 6.8 G/DL (ref 14–18)
HYPOCHROMIA BLD QL SMEAR: ABNORMAL
IMM GRANULOCYTES # BLD AUTO: 0.07 K/UL (ref 0–0.04)
IMM GRANULOCYTES NFR BLD AUTO: 4.7 % (ref 0–0.5)
IRON SERPL-MCNC: 224 UG/DL (ref 45–160)
LDH SERPL L TO P-CCNC: 120 U/L (ref 110–260)
LYMPHOCYTES # BLD AUTO: 0.8 K/UL (ref 1–4.8)
LYMPHOCYTES NFR BLD: 51.4 % (ref 18–48)
Lab: NORMAL
MCH RBC QN AUTO: 34.2 PG (ref 27–31)
MCHC RBC AUTO-ENTMCNC: 33.7 G/DL (ref 32–36)
MCV RBC AUTO: 102 FL (ref 82–98)
MICROSCOPIC EXAM: NORMAL
MONOCYTES # BLD AUTO: 0.1 K/UL (ref 0.3–1)
MONOCYTES NFR BLD: 5.4 % (ref 4–15)
NEUTROPHILS # BLD AUTO: 0.5 K/UL (ref 1.8–7.7)
NEUTROPHILS NFR BLD: 32.4 % (ref 38–73)
NGS CLINCIAL TRIALS: NORMAL
NGS INDICATION OF TEST: NORMAL
NGS INTERPRETATION: NORMAL
NGS ONCOHEME PANEL GENE LIST: NORMAL
NGS PATHOGENIC MUTATIONS DETECTED: NORMAL
NGS REVIEWED BY:: NORMAL
NGS VARIANTS OF UNKNOWN SIGNIFICANCE: NORMAL
NGSHM RESULT, BONE MARROW: NORMAL
NRBC BLD-RTO: 2 /100 WBC
PLATELET # BLD AUTO: 27 K/UL (ref 150–450)
PLATELET BLD QL SMEAR: ABNORMAL
PMV BLD AUTO: 11.4 FL (ref 9.2–12.9)
POIKILOCYTOSIS BLD QL SMEAR: SLIGHT
POLYCHROMASIA BLD QL SMEAR: ABNORMAL
POTASSIUM SERPL-SCNC: 3.9 MMOL/L (ref 3.5–5.1)
PROT SERPL-MCNC: 6.3 G/DL (ref 6–8.4)
RBC # BLD AUTO: 1.99 M/UL (ref 4.6–6.2)
REF LAB TEST METHOD: NORMAL
SATURATED IRON: 90 % (ref 20–50)
SODIUM SERPL-SCNC: 139 MMOL/L (ref 136–145)
SPECIMEN OUTDATE: NORMAL
SPECIMEN SOURCE: NORMAL
SUPPLEMENTAL DIAGNOSIS: NORMAL
TEST PERFORMANCE INFO SPEC: NORMAL
TOTAL IRON BINDING CAPACITY: 250 UG/DL (ref 250–450)
TRANSFERRIN SERPL-MCNC: 169 MG/DL (ref 200–375)
URATE SERPL-MCNC: 5 MG/DL (ref 3.4–7)
WBC # BLD AUTO: 1.48 K/UL (ref 3.9–12.7)

## 2024-05-08 PROCEDURE — 85007 BL SMEAR W/DIFF WBC COUNT: CPT | Mod: BTX | Performed by: INTERNAL MEDICINE

## 2024-05-08 PROCEDURE — 83615 LACTATE (LD) (LDH) ENZYME: CPT | Performed by: INTERNAL MEDICINE

## 2024-05-08 PROCEDURE — 85027 COMPLETE CBC AUTOMATED: CPT | Mod: BTX | Performed by: INTERNAL MEDICINE

## 2024-05-08 PROCEDURE — 36415 COLL VENOUS BLD VENIPUNCTURE: CPT | Performed by: INTERNAL MEDICINE

## 2024-05-08 PROCEDURE — 80053 COMPREHEN METABOLIC PANEL: CPT | Performed by: INTERNAL MEDICINE

## 2024-05-08 PROCEDURE — 84550 ASSAY OF BLOOD/URIC ACID: CPT | Performed by: INTERNAL MEDICINE

## 2024-05-08 PROCEDURE — 82728 ASSAY OF FERRITIN: CPT | Mod: BTX | Performed by: INTERNAL MEDICINE

## 2024-05-08 PROCEDURE — 83540 ASSAY OF IRON: CPT | Mod: BTX | Performed by: INTERNAL MEDICINE

## 2024-05-08 RX ORDER — HYDROCODONE BITARTRATE AND ACETAMINOPHEN 500; 5 MG/1; MG/1
TABLET ORAL ONCE
Status: CANCELLED | OUTPATIENT
Start: 2024-05-08 | End: 2024-05-08

## 2024-05-08 RX ORDER — ACETAMINOPHEN 325 MG/1
650 TABLET ORAL
Status: CANCELLED | OUTPATIENT
Start: 2024-05-08

## 2024-05-08 RX ORDER — DIPHENHYDRAMINE HCL 25 MG
25 CAPSULE ORAL
Status: CANCELLED | OUTPATIENT
Start: 2024-05-08

## 2024-05-09 ENCOUNTER — PATIENT MESSAGE (OUTPATIENT)
Dept: HEMATOLOGY/ONCOLOGY | Facility: CLINIC | Age: 69
End: 2024-05-09
Payer: MEDICARE

## 2024-05-09 ENCOUNTER — INFUSION (OUTPATIENT)
Dept: INFUSION THERAPY | Facility: HOSPITAL | Age: 69
End: 2024-05-09
Attending: INTERNAL MEDICINE
Payer: MEDICARE

## 2024-05-09 VITALS
OXYGEN SATURATION: 98 % | RESPIRATION RATE: 16 BRPM | SYSTOLIC BLOOD PRESSURE: 106 MMHG | HEART RATE: 88 BPM | DIASTOLIC BLOOD PRESSURE: 60 MMHG | TEMPERATURE: 99 F

## 2024-05-09 DIAGNOSIS — D46.9 MYELODYSPLASTIC SYNDROME: ICD-10-CM

## 2024-05-09 DIAGNOSIS — D64.9 SYMPTOMATIC ANEMIA: Primary | ICD-10-CM

## 2024-05-09 DIAGNOSIS — D46.9 MDS (MYELODYSPLASTIC SYNDROME): Primary | ICD-10-CM

## 2024-05-09 DIAGNOSIS — Z76.82 STEM CELL TRANSPLANT CANDIDATE: Primary | ICD-10-CM

## 2024-05-09 DIAGNOSIS — R19.7 DIARRHEA, UNSPECIFIED TYPE: Primary | ICD-10-CM

## 2024-05-09 DIAGNOSIS — D64.9 SYMPTOMATIC ANEMIA: ICD-10-CM

## 2024-05-09 PROCEDURE — 36430 TRANSFUSION BLD/BLD COMPNT: CPT | Mod: NBTX

## 2024-05-09 PROCEDURE — 25000003 PHARM REV CODE 250: Mod: NBTX | Performed by: INTERNAL MEDICINE

## 2024-05-09 RX ORDER — DIPHENHYDRAMINE HCL 25 MG
25 CAPSULE ORAL
Status: DISCONTINUED | OUTPATIENT
Start: 2024-05-09 | End: 2024-05-09 | Stop reason: HOSPADM

## 2024-05-09 RX ORDER — HYDROCODONE BITARTRATE AND ACETAMINOPHEN 500; 5 MG/1; MG/1
TABLET ORAL ONCE
Status: COMPLETED | OUTPATIENT
Start: 2024-05-09 | End: 2024-05-09

## 2024-05-09 RX ORDER — HYDROCODONE BITARTRATE AND ACETAMINOPHEN 500; 5 MG/1; MG/1
TABLET ORAL ONCE
Status: CANCELLED | OUTPATIENT
Start: 2024-05-09 | End: 2024-05-09

## 2024-05-09 RX ORDER — ACETAMINOPHEN 325 MG/1
650 TABLET ORAL
Status: DISCONTINUED | OUTPATIENT
Start: 2024-05-09 | End: 2024-05-09 | Stop reason: HOSPADM

## 2024-05-09 RX ADMIN — SODIUM CHLORIDE: 9 INJECTION, SOLUTION INTRAVENOUS at 11:05

## 2024-05-09 NOTE — PLAN OF CARE
Pt tolerated 1 unit PRBC transfusion well. VSS. No adverse reaction noted. PIV flushed with NS and D/C per protocol.  Patient left clinic in no acute distress.

## 2024-05-14 DIAGNOSIS — R11.0 NAUSEA: Primary | ICD-10-CM

## 2024-05-14 DIAGNOSIS — K92.1 BLOOD IN STOOL: Primary | ICD-10-CM

## 2024-05-14 RX ORDER — ONDANSETRON HYDROCHLORIDE 8 MG/1
8 TABLET, FILM COATED ORAL EVERY 8 HOURS PRN
Qty: 30 TABLET | Refills: 3 | Status: SHIPPED | OUTPATIENT
Start: 2024-05-14

## 2024-05-14 RX ORDER — ONDANSETRON HYDROCHLORIDE 8 MG/1
8 TABLET, FILM COATED ORAL EVERY 8 HOURS PRN
COMMUNITY
End: 2024-05-14 | Stop reason: SDUPTHER

## 2024-05-15 ENCOUNTER — HOSPITAL ENCOUNTER (OUTPATIENT)
Dept: RADIOLOGY | Facility: HOSPITAL | Age: 69
Discharge: HOME | End: 2024-05-15
Attending: INTERNAL MEDICINE
Payer: MEDICARE

## 2024-05-15 ENCOUNTER — OFFICE VISIT (OUTPATIENT)
Dept: TRANSPLANT | Facility: CLINIC | Age: 69
End: 2024-05-15
Attending: INTERNAL MEDICINE
Payer: MEDICARE

## 2024-05-15 ENCOUNTER — HOSPITAL ENCOUNTER (OUTPATIENT)
Dept: PULMONOLOGY | Facility: CLINIC | Age: 69
Discharge: HOME | End: 2024-05-15
Attending: INTERNAL MEDICINE
Payer: MEDICARE

## 2024-05-15 VITALS
TEMPERATURE: 98 F | DIASTOLIC BLOOD PRESSURE: 62 MMHG | HEIGHT: 68 IN | OXYGEN SATURATION: 97 % | WEIGHT: 166 LBS | HEART RATE: 91 BPM | SYSTOLIC BLOOD PRESSURE: 124 MMHG | HEIGHT: 68 IN | BODY MASS INDEX: 25.16 KG/M2 | BODY MASS INDEX: 25.86 KG/M2 | RESPIRATION RATE: 16 BRPM | WEIGHT: 170.63 LBS

## 2024-05-15 DIAGNOSIS — D46.9 MYELODYSPLASTIC SYNDROME: ICD-10-CM

## 2024-05-15 DIAGNOSIS — R94.2 ABNORMAL PFT: Primary | ICD-10-CM

## 2024-05-15 DIAGNOSIS — Z76.82 STEM CELL TRANSPLANT CANDIDATE: ICD-10-CM

## 2024-05-15 DIAGNOSIS — J43.9 PULMONARY EMPHYSEMA, UNSPECIFIED EMPHYSEMA TYPE: ICD-10-CM

## 2024-05-15 PROCEDURE — 71250 CT THORAX DX C-: CPT | Mod: TC,BTX

## 2024-05-15 PROCEDURE — 99999 PR PBB SHADOW E&M-EST. PATIENT-LVL III: CPT | Mod: PBBFAC,,, | Performed by: INTERNAL MEDICINE

## 2024-05-15 NOTE — PROGRESS NOTES
PATIENT: Guillaume Salinas  MRN: [id number]  DATE: 5/16/2024    Diagnosis:   1. Myelodysplastic syndrome    2. Pancytopenia    3. Diarrhea, unspecified type    4. Chemotherapy-induced neutropenia    5. Secondary thrombocytopenia    6. Immunodeficiency secondary to neoplasm    7. Chemotherapy-induced nausea and vomiting    8. Insomnia, unspecified type      Chief Complaint: myelodysplastic syndrome    Oncologic History:      Oncologic History Myelodysplastic syndrome      Oncologic Treatment Azacitidine/venetoclax (start date 6/12/23).      Pathology 4/23/24:  BONE MARROW ASPIRATE, TOUCH PREP, CLOT, AND DECALCIFIED NEEDLE CORE BIOPSY:   RIGHT POSTEROSUPERIOR ILIAC CREST   - MORPHOLOGIC AND IMMUNOPHENOTYPIC FINDINGS COMPATIBLE WITH PERSISTENT MYELOID NEOPLASM, correlate with NGS results for final interpretation (see comments)   - VERY LIMITED SPECIMEN: Findings may not be entirely representative   - Cellular marrow with increased, subset of minute clusters of CD34+ immunophenotypic blasts (at least 5% by flow cytometric analysis) and residual trilineage hematopoiesis with marked megakaryocytic hypoplasia with increased micromegakaryocytes (see   comments)   - Relative lymphocytosis with unremarkable small mature lymphocytes   - Normal AML adult FISH panel studies (see comments)   - Adequate stainable histiocytic iron stores (3+ out of 6+)     7/3/23:  BONE MARROW ASPIRATE, TOUCH PREP, CLOT, AND DECALCIFIED NEEDLE CORE BIOPSY:   LEFT POSTEROSUPERIOR ILIAC CREST   - MORPHOLOGIC AND IMMUNOPHENOTYPIC FEATURES OF PERSISTENT MDS   - LIMITED, SUBOPTIMAL SPECIMEN: Findings may not be entirely representative   - Hypocellular marrow (20% total cellularity) with no increased CD34+ blasts, chemotherapeutic changes, and scant residual trilineage hypoplasia and dyspoiesis with increased numbers of micromegakaryocytes   - Relative lymphocytosis including small, well-defined lymphoid aggregates (favor benign/reactive)   -  Relative polytypic plasmacytosis (5-10%) with morphologically unremarkable plasma cells   - Mildly increased stainable histiocytic iron stores (4+ out of 6+)     5/23/23:  LEFT ILIAC CREST BONE MARROW ASPIRATE, BONE MARROW CLOT, AND BONE MARROW CORE BIOPSY WITH:     CELLULARITY= 90-95%, MYELOID PREDOMINANCE (M/E= 7:1).   CONSISTENT WITH MYELODYSPLASTIC SYNDROME WITH EXCESS BLASTS-2 (16-17%).  SEE COMMENT.   ADEQUATE STORAGE IRON.   INCREASED NUMBER OF MEGAKARYOCYTES WITH DYSPLASTIC MORPHOLOGY.       4/26/23:  Bone marrow, left iliac crest, aspirate, clot, and core biopsy:   --Hypercellular marrow for age, 80-90% with trilineage maturation showing increased blasts and small megakaryocytic forms, most compatible with a primary marrow neoplasm, favor myelodysplasia with excess blasts (MDS-EB-2) with impending evolution, final   classification pending correlation with cytogenetic and molecular studies, see comment   --Stainable iron is not increased   --Mild reticulin fibrosis          Bone marrow, left iliac crest, aspirate, clot, and core biopsy:   --Hypercellular marrow for age, 80-90% with trilineage maturation showing increased blasts and small megakaryocytic forms, most compatible with a primary marrow neoplasm, favor myelodysplasia with excess blasts (MDS-EB-2) with impending evolution, final   classification pending correlation with cytogenetic and molecular studies, see comment   --Stainable iron is not increased   --Mild reticulin fibrosis     Comment:  Concomitantly submitted flow cytometric analysis detects a mildly increased myeloblast population, concerning for a primary marrow process.  The blast gate is increased with approximately 8% CD38/CD34 positive blasts identified.  Populations of    B lymphocytes are polyclonal and T lymphocytes are immunophenotypically unremarkable.     This study is somewhat limited by lack of cellularity on aspirate smears with mild reticulin fibrosis noted.  However, there  "are increased CD34 positive blasts, counted at 12% on the immunohistochemical stain with increased megakaryocytes showing many micromegakaryocytic forms.  The morphology in conjunction with peripheral cytopenias is compatible with a primary marrow neoplasm, highly suggestive of myelodysplasia with excess blasts (MDS-EB-2) although an evolving acute leukemia is of concern.     Correlation with clinical findings and any available cytogenetic and molecular studies is required for further characterization.              Subjective:    History of Present Illness: Mr. Betsy Salinas is a 68 y.o. male who presented in April 2023 for evaluation and management of anemia.    [I do not see evidence of anemia in his labs, but he was referred for anemia workup.]    - he went to [place] for a dental procedure. He had several teeth removed ("an intense procedure per his wife"). He had taken antibiotics/amoxicillin and ibuprofen. He had the second part of procedure about a week later. He noted severe fatigue, weakness.  - he underwent bone marrow biopsy on 4/26/23.  - he met with Dr. Hickey on 5/8/23. He recommended repeat bone marrow biopsy.  - he underwent bone marrow biopsy on 5/23/23.  - he met with Dr. Hickey on 5/30/23. 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 CG/FISH/NGS pending. I reviewed with him and his family the aggressive nature of this disease, including natural history, prognosis, and treatment options. I recommended treatment with azacitidine 75 mg/m2 daily x7 days plus venetoclax 100mg (posa) daily x21 of 28 days. He was in agreement. Consent signed today.   Azacitidine plus venetoclax x21 of 28 days as above. Taz ramp up ordered.  Repeat bone marrow biopsy at the end of cycle 1. Orders placed.     Stem cell transplant candidate: We briefly discussed allogeneic stem cell transplantation in MDS. He will need transplant in CR1. Will plan for further " "discussion of stem cell transplant and meeting with transplant coordinators at next follow up in 2-3 weeks.   Will send HLA typing with next labs."    - he underwent bone marrow biopsy on 7/3/23    - he met with Dr. Hickey on 6/20/23. Information per note:  "HCT-CI of 1 (intermediate risk). Will plan to proceed with transplant in the next 2-3 months (as soon as a donor is identified). HLA typing sent."    - he saw Dr. Hickey on 7/11/23. Per his note: "will plan to proceed with transplant in the next 2-3 months (as soon as a donor is identified)."    - he underwent bone marrow biopsy on 9/6/23.  - he saw Dr. Hickey on 10/14/23.    - he saw Dr. Hickey on 1/19/24. Per his note:  "His sister's typing is still pending. Given her age and timing for transplant, I believe it would be best to move forward with transplant using one of his children as a donor. We discussed pros and cons, and they are in agreement. They have one more trip to [place] planned in March, so will plan for transplant after March (tentatively April)."    - he was hospitalized in April 2024 for neutropenic fever/sepsis/possible gastrointestinal bleeding.    Interval history:  - he presents for a follow-up appointment for his myelodysplastic syndrome.  - he underwent bone marrow biopsy on 4/23/24.  - he is scheduled to see Dr. Hickey next week and be admitted for stem cell transplant. Transplant is scheduled for 5/29/24.  - today, he endorses fatigue, nausea, diarrhea, decreased appetite, dyspnea upon exertion.        Past medical, surgical, family, and social histories have been reviewed and updated below.    Past Medical History:   Past Medical History:   Diagnosis Date    Anticoagulant long-term use     Coronary artery disease     Hypertension     Peripheral vascular disease, unspecified        Past Surgical History:   Past Surgical History:   Procedure Laterality Date    BONE MARROW BIOPSY Left 4/26/2023    Procedure: Biopsy-bone marrow;  " Surgeon: Harry Diamond MD;  Location: [facility] OR;  Service: Oncology;  Laterality: Left;    COLONOSCOPY N/A 01/05/2022    Procedure: COLONOSCOPY Golytely Vaccinated will bring cards;  Surgeon: Dereje Simon MD;  Location: [facility] ENDO;  Service: Endoscopy;  Laterality: N/A;  Do not cancel this order       Family History:   Family History   Problem Relation Name Age of Onset    Hypertension Mother      Cancer Father      Cancer Brother 9     No Known Problems Daughter      No Known Problems Daughter      No Known Problems Son         Social History:  reports that he quit smoking about 14 months ago. His smoking use included cigarettes. He started smoking about 51 years ago. He has a 12.5 pack-year smoking history. He has been exposed to tobacco smoke. He has never used smokeless tobacco. He reports that he does not currently use alcohol. He reports that he does not use drugs.    Allergies:  Review of patient's allergies indicates:  No Known Allergies    Medications:  Current Outpatient Medications   Medication Sig Dispense Refill    acyclovir (ZOVIRAX) 400 MG tablet Take 1 tablet (400 mg total) by mouth 2 (two) times daily. 60 tablet 11    carvediloL (COREG) 6.25 MG tablet Take 1 tablet (6.25 mg total) by mouth 2 (two) times daily. 180 tablet 3    cilostazoL (PLETAL) 50 MG Tab Take 1 tablet (50 mg total) by mouth 2 (two) times daily. 180 tablet 3    gabapentin (NEURONTIN) 300 MG capsule Take 1 capsule (300 mg total) by mouth 3 (three) times daily. 90 capsule 11    levoFLOXacin (LEVAQUIN) 500 MG tablet Take 1 tablet (500 mg total) by mouth once daily. 30 tablet 11    ondansetron (ZOFRAN) 8 MG tablet Take 1 tablet (8 mg total) by mouth every 8 (eight) hours as needed for Nausea. 30 tablet 3    pantoprazole (PROTONIX) 40 MG tablet Take 1 tablet (40 mg total) by mouth once daily. 30 tablet 3    promethazine (PHENERGAN) 25 MG tablet Take 1 tablet (25 mg total) by mouth every 8 (eight) hours as needed for Nausea. 40  tablet 5    venetoclax (VENCLEXTA) 100 mg Tab Take 1 tablet (100 mg) by mouth once daily on days 1-7 of a 28-day cycle. Do not discard any remaining tablets. 28 tablet 11    voriconazole (VFEND) 200 MG Tab Take 1 tablet (200 mg total) by mouth 2 (two) times daily. 60 tablet 11    zolpidem (AMBIEN) 10 mg Tab TAKE 1 TABLET(10 MG) BY MOUTH EVERY NIGHT AS NEEDED 30 tablet 0     No current facility-administered medications for this visit.       Review of Systems   Constitutional:  Positive for fatigue.   HENT:  Negative for sore throat.    Eyes:  Negative for visual disturbance.   Respiratory:  Positive for shortness of breath.    Cardiovascular:  Negative for chest pain.   Gastrointestinal:  Positive for diarrhea and nausea. Negative for abdominal pain.   Genitourinary:  Negative for dysuria.   Musculoskeletal:  Negative for back pain.   Skin:  Negative for rash.   Neurological:  Positive for weakness and headaches.   Hematological:  Negative for adenopathy.   Psychiatric/Behavioral:  The patient is not nervous/anxious.        ECOG Performance Status:   ECOG SCORE             Objective:      Vitals:   There were no vitals filed for this visit.      BMI: There is no height or weight on file to calculate BMI.    Physical Exam  Vitals and nursing note reviewed.   Constitutional:       Appearance: He is well-developed.   HENT:      Head: Normocephalic and atraumatic.   Eyes:      Pupils: Pupils are equal, round, and reactive to light.   Cardiovascular:      Rate and Rhythm: Normal rate and regular rhythm.   Pulmonary:      Effort: Pulmonary effort is normal.      Breath sounds: Normal breath sounds.   Abdominal:      General: Bowel sounds are normal.      Palpations: Abdomen is soft.   Musculoskeletal:         General: Normal range of motion.      Cervical back: Normal range of motion and neck supple.   Skin:     General: Skin is warm and dry.   Neurological:      Mental Status: He is alert and oriented to person, place,  "and time.   Psychiatric:         Behavior: Behavior normal.         Thought Content: Thought content normal.         Judgment: Judgment normal.         Laboratory Data:  Labs have been reviewed.    Lab Results   Component Value Date    WBC 1.48 (LL) 05/08/2024    HGB 6.8 (L) 05/08/2024    HCT 20.2 (L) 05/08/2024     (H) 05/08/2024    PLT 27 (LL) 05/08/2024       Imaging:        Assessment:       1. Myelodysplastic syndrome    2. Pancytopenia    3. Diarrhea, unspecified type    4. Chemotherapy-induced neutropenia    5. Secondary thrombocytopenia    6. Immunodeficiency secondary to neoplasm    7. Chemotherapy-induced nausea and vomiting    8. Insomnia, unspecified type           Plan:     Myelodysplastic syndrome / neutropenia due to chemo.  - bone marrow biopsy (4/26/23) revealed myelodysplastic syndrome.  - he met with Dr. Hickey on 5/8/23. He recommended repeat bone marrow biopsy.  - bone marrow biopsy (5/23/23) revealed myelodysplastic syndrome with excess blasts.  - he met with Dr. Hickey on 5/30/23. He recommended azacitidine (7-day) with venetoclax.  - he has received several blood transfusions in June 2023  - he met with Dr. Hickey on 6/20/23. Information per note:  "HCT-CI of 1 (intermediate risk). Will plan to proceed with transplant in the next 2-3 months (as soon as a donor is identified). HLA typing sent."  - bone marrow biopsy (7/3/23) revealed no increased blasts! He will follow up with Dr. Hickey on 7/11/23  - he has received blood/platelets on 7/5/23.  - he has not received blood/platelet transfusion in several weeks.  - he saw Dr. Hickey on 8/15/23. Per his note:  " Will plan to proceed with transplant ASA (as soon as a donor is identified). HLA typing sent."  - he underwent bone marrow biopsy on 9/6/23.   - change vidaza to 5 days, and venetoclax from 14 days to 7 days every 28 days (due to cytopenias).  - holding azacitidine and ventoclax due to cytopenias.  - bone marrow biopsy " (4/23/24) revealed myeloid neoplasm with subset of minute clusters of CD34+ immunophenotypic blasts (at least 5% by flow cytometric analysis) and residual trilineage hematopoiesis with marked megakaryocytic hypoplasia with increased micromegakaryocytes.  - follow up labs from today.   - he is scheduled to see Dr. Hickey next week and be admitted for stem cell transplant. Transplant is scheduled for 5/29/24.  - scheduled to see gastroenterology next week.  - return to clinic in 3 months.    2. Chemotherapy-induced nausea/vomiting  - mild symptoms. Continue phenergan as needed.    3. Atherosclerosis of aorta  - seen on imaging  - continue aspirin, atorvastatin    4. Insomnia  - I refilled his ambien.    5. Advance Care Planning     Power of   After our discussion (at previous visit), the patient has decided not to complete a HCPOA.       - return to clinic in 3 months.    Harry Diamond M.D.  Hematology/Oncology  [facility] - [address], [address]  [place] [address]  Phone: [phone]  Fax: [phone]

## 2024-05-16 ENCOUNTER — DOCUMENTATION ONLY (OUTPATIENT)
Dept: HEMATOLOGY/ONCOLOGY | Facility: CLINIC | Age: 69
End: 2024-05-16

## 2024-05-16 ENCOUNTER — PATIENT MESSAGE (OUTPATIENT)
Dept: HEMATOLOGY/ONCOLOGY | Facility: CLINIC | Age: 69
End: 2024-05-16

## 2024-05-16 ENCOUNTER — OFFICE VISIT (OUTPATIENT)
Dept: HEMATOLOGY/ONCOLOGY | Facility: CLINIC | Age: 69
End: 2024-05-16
Payer: MEDICARE

## 2024-05-16 ENCOUNTER — TELEPHONE (OUTPATIENT)
Dept: GASTROENTEROLOGY | Facility: CLINIC | Age: 69
End: 2024-05-16
Payer: MEDICARE

## 2024-05-16 ENCOUNTER — LAB VISIT (OUTPATIENT)
Dept: LAB | Facility: HOSPITAL | Age: 69
End: 2024-05-16
Attending: INTERNAL MEDICINE
Payer: MEDICARE

## 2024-05-16 VITALS
TEMPERATURE: 98 F | OXYGEN SATURATION: 97 % | BODY MASS INDEX: 25.51 KG/M2 | RESPIRATION RATE: 18 BRPM | WEIGHT: 167.75 LBS | HEART RATE: 92 BPM | SYSTOLIC BLOOD PRESSURE: 115 MMHG | DIASTOLIC BLOOD PRESSURE: 65 MMHG

## 2024-05-16 DIAGNOSIS — G47.00 INSOMNIA, UNSPECIFIED TYPE: ICD-10-CM

## 2024-05-16 DIAGNOSIS — T45.1X5A CHEMOTHERAPY-INDUCED NEUTROPENIA: ICD-10-CM

## 2024-05-16 DIAGNOSIS — D69.59 SECONDARY THROMBOCYTOPENIA: ICD-10-CM

## 2024-05-16 DIAGNOSIS — D84.81 IMMUNODEFICIENCY SECONDARY TO NEOPLASM: ICD-10-CM

## 2024-05-16 DIAGNOSIS — R11.2 CHEMOTHERAPY-INDUCED NAUSEA AND VOMITING: ICD-10-CM

## 2024-05-16 DIAGNOSIS — D49.9 IMMUNODEFICIENCY SECONDARY TO NEOPLASM: ICD-10-CM

## 2024-05-16 DIAGNOSIS — D46.9 MYELODYSPLASTIC SYNDROME: Primary | ICD-10-CM

## 2024-05-16 DIAGNOSIS — T45.1X5A CHEMOTHERAPY-INDUCED NAUSEA AND VOMITING: ICD-10-CM

## 2024-05-16 DIAGNOSIS — D70.1 CHEMOTHERAPY-INDUCED NEUTROPENIA: ICD-10-CM

## 2024-05-16 DIAGNOSIS — D46.9 MYELODYSPLASTIC SYNDROME: ICD-10-CM

## 2024-05-16 DIAGNOSIS — R19.7 DIARRHEA, UNSPECIFIED TYPE: ICD-10-CM

## 2024-05-16 DIAGNOSIS — D61.818 PANCYTOPENIA: ICD-10-CM

## 2024-05-16 LAB
ALBUMIN SERPL BCP-MCNC: 3.5 G/DL (ref 3.5–5.2)
ALP SERPL-CCNC: 65 U/L (ref 55–135)
ALT SERPL W/O P-5'-P-CCNC: 12 U/L (ref 10–44)
ANION GAP SERPL CALC-SCNC: 9 MMOL/L (ref 8–16)
ANISOCYTOSIS BLD QL SMEAR: SLIGHT
AST SERPL-CCNC: 13 U/L (ref 10–40)
BASOPHILS NFR BLD: 1 % (ref 0–1.9)
BILIRUB SERPL-MCNC: 0.5 MG/DL (ref 0.1–1)
BUN SERPL-MCNC: 8 MG/DL (ref 8–23)
CALCIUM SERPL-MCNC: 9.1 MG/DL (ref 8.7–10.5)
CHLORIDE SERPL-SCNC: 107 MMOL/L (ref 95–110)
CO2 SERPL-SCNC: 26 MMOL/L (ref 23–29)
CREAT SERPL-MCNC: 0.9 MG/DL (ref 0.5–1.4)
DIFFERENTIAL METHOD BLD: ABNORMAL
EOSINOPHIL NFR BLD: 6 % (ref 0–8)
ERYTHROCYTE [DISTWIDTH] IN BLOOD BY AUTOMATED COUNT: 20.1 % (ref 11.5–14.5)
EST. GFR  (NO RACE VARIABLE): >60 ML/MIN/1.73 M^2
GLUCOSE SERPL-MCNC: 127 MG/DL (ref 70–110)
HCT VFR BLD AUTO: 22.1 % (ref 40–54)
HGB BLD-MCNC: 7.4 G/DL (ref 14–18)
HYPOCHROMIA BLD QL SMEAR: ABNORMAL
IMM GRANULOCYTES # BLD AUTO: ABNORMAL K/UL (ref 0–0.04)
IMM GRANULOCYTES NFR BLD AUTO: ABNORMAL % (ref 0–0.5)
LDH SERPL L TO P-CCNC: 133 U/L (ref 110–260)
LYMPHOCYTES NFR BLD: 48 % (ref 18–48)
MCH RBC QN AUTO: 33.6 PG (ref 27–31)
MCHC RBC AUTO-ENTMCNC: 33.5 G/DL (ref 32–36)
MCV RBC AUTO: 101 FL (ref 82–98)
MONOCYTES NFR BLD: 2 % (ref 4–15)
NEUTROPHILS NFR BLD: 43 % (ref 38–73)
NRBC BLD-RTO: 3 /100 WBC
PLATELET # BLD AUTO: 22 K/UL (ref 150–450)
PLATELET BLD QL SMEAR: ABNORMAL
PMV BLD AUTO: 10 FL (ref 9.2–12.9)
POTASSIUM SERPL-SCNC: 4.4 MMOL/L (ref 3.5–5.1)
PROT SERPL-MCNC: 6.3 G/DL (ref 6–8.4)
RBC # BLD AUTO: 2.2 M/UL (ref 4.6–6.2)
SODIUM SERPL-SCNC: 142 MMOL/L (ref 136–145)
URATE SERPL-MCNC: 4.9 MG/DL (ref 3.4–7)
WBC # BLD AUTO: 2.05 K/UL (ref 3.9–12.7)

## 2024-05-16 PROCEDURE — 85027 COMPLETE CBC AUTOMATED: CPT | Mod: BTX | Performed by: INTERNAL MEDICINE

## 2024-05-16 PROCEDURE — 84550 ASSAY OF BLOOD/URIC ACID: CPT | Mod: BTX | Performed by: INTERNAL MEDICINE

## 2024-05-16 PROCEDURE — 83615 LACTATE (LD) (LDH) ENZYME: CPT | Performed by: INTERNAL MEDICINE

## 2024-05-16 PROCEDURE — 99999 PR PBB SHADOW E&M-EST. PATIENT-LVL III: CPT | Mod: PBBFAC,,, | Performed by: INTERNAL MEDICINE

## 2024-05-16 PROCEDURE — 80053 COMPREHEN METABOLIC PANEL: CPT | Mod: BTX | Performed by: INTERNAL MEDICINE

## 2024-05-16 PROCEDURE — 36415 COLL VENOUS BLD VENIPUNCTURE: CPT | Mod: BTX | Performed by: INTERNAL MEDICINE

## 2024-05-16 PROCEDURE — 85007 BL SMEAR W/DIFF WBC COUNT: CPT | Performed by: INTERNAL MEDICINE

## 2024-05-16 RX ORDER — ZOLPIDEM TARTRATE 10 MG/1
10 TABLET ORAL NIGHTLY PRN
Qty: 30 TABLET | Refills: 0 | Status: SHIPPED | OUTPATIENT
Start: 2024-05-16

## 2024-05-16 NOTE — PROCEDURES
Guillaume Salinas is a 68 y.o.  male patient, who presents for a 6 minute walk test ordered by DO Mamie.  The diagnosis is Pre-operative Evaluation; Abnormal PFTs.  The patient's BMI is 26 kg/m2.  Predicted distance (lower limit of normal) is 369.22 meters.      Test Results:    The test was completed without stopping.  The total time walked was 360 seconds.  During walking, the patient reported:  Chest pain, Dyspnea, Leg pain.  The patient used no assistive devices during testing.     05/15/2024---------Distance: 381 meters (1250 feet)     Lap Walk Time O2 Sat % Supplemental Oxygen Heart Rate Blood Pressure Yisel Scale   Pre-exercise  (Resting) 0 0 97 % Room Air 99 bpm 133/77 mmHg 1   During Exercise 1 60 sec 94 % Room Air 111 bpm     During Exercise 2 110 sec 94 % Room Air 116 bpm     During Exercise 3 170 sec 95 % Room Air 118 bpm     During Exercise 4 240 sec 94 % Room Air 121 bpm     During Exercise 5 290 sec 96 % Room Air 121 bpm     During Exercise 6 340 sec 96 % Room Air 121 bpm     End of Exercise  360 sec 96 % Room Air 121 bpm 130/80 mmHg 4   Post-exercise  (Recovery)   99 % Room Air  100 bpm       Recovery Time: 120 seconds    Performing nurse/tech: APRIL Rosas      PREVIOUS STUDY:   The patient has not had a previous study.      CLINICAL INTERPRETATION:  Six minute walk distance is 381 meters (1250 feet) with somewhat heavy dyspnea.  During exercise, there was no significant desaturation while breathing room air.  Blood pressure remained stable and Heart rate increased significantly with walking.  The patient reported non-pulmonary symptoms during exercise.  No previous study performed.  Based upon age and body mass index, exercise capacity is normal.

## 2024-05-16 NOTE — TELEPHONE ENCOUNTER
Contacted pt and scheduled clinic visit 05/21. V/U    ----- Message from Magdaleno Navarro MD sent at 5/16/2024  8:39 AM CDT -----  Thanks..  Radu, this is patient I mentioned on phone yesterday  Can you see if we can fit him into the 820 slot.  Sorry if I didn't CC you on the last one.    Let me know when done  ----- Message -----  From: Mohit Hickey MD  Sent: 5/16/2024   8:20 AM CDT  To: Magdaleno Navarro MD    You're the man. Thanks Angelo. I may have overlooked it but it doesn't look like you cc'ed Radu  ----- Message -----  From: Magdaleno Navarro MD  Sent: 5/14/2024   9:45 PM CDT  To: Mohit Hickey MD    I can fit him in next Tuesday...    Radu,  Can you please book this patient into 820 slot next Tuesday. (5/21)  ----- Message -----  From: Mohit Hickey MD  Sent: 5/14/2024   6:18 PM CDT  To: Magdaleno Navarro MD    Angelo,    Do you mind seeing this patient of mine rather urgently? He has MDS and we're gearing up for allogeneic stem cell transplant. He was admitted in [place] about 6 weeks ago with what sounds like a GI bleed. His hemoglobin remains low, but he's been off treatment for the past 2 months. He said he's had intermittent blood in his stool. He's also reporting persistent diarrhea for unclear reasons (not sure if it's blood or infectious). I'm hoping to figure out what's going on before we go to transplant. I may need to delay him for further workup which I'm fine doing if we need to.     Thank Angelo!    [place]

## 2024-05-17 ENCOUNTER — TELEPHONE (OUTPATIENT)
Dept: HEMATOLOGY/ONCOLOGY | Facility: CLINIC | Age: 69
End: 2024-05-17
Payer: MEDICARE

## 2024-05-17 NOTE — PROGRESS NOTES
ADVANCED LUNG DISEASE CLINIC INITIAL EVALUATION                                                                                                                                             Reason for Visit:  Abnormal PFTs; pulm clearance for SCT    Referring Physician: Mohit Hickey MD    History of Present Illness: Guillaume Salinas is a 68 y.o. male who is on 0L of oxygen.  He is on no assisted ventilation.  His New York Heart Association Class is I and a Karnofsky score of 100% - Normal, No Complaints, no evidence of disease. He is not diabetic.    Presents today as part of SCT work-up for abnormal pulmonary function testing.  States he has no respiratory symptoms.  Is a previous smoker.  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  No mold.  No seasonal allergies or GERD.  No family hx of autoimmune or lung disease.  No history of asthma or COPD.  Never been on any respiratory medications.  No bird exposures.  Overall, no respiratory issues.      Past Medical History:   Diagnosis Date    Anticoagulant long-term use     Coronary artery disease     Hypertension     Peripheral vascular disease, unspecified        Past Surgical History:   Procedure Laterality Date    BONE MARROW BIOPSY Left 4/26/2023    Procedure: Biopsy-bone marrow;  Surgeon: Harry Diamond MD;  Location: [facility] OR;  Service: Oncology;  Laterality: Left;    COLONOSCOPY N/A 01/05/2022    Procedure: COLONOSCOPY Golytely Vaccinated will bring cards;  Surgeon: Dereje Simon MD;  Location: [facility] ENDO;  Service: Endoscopy;  Laterality: N/A;  Do not cancel this order       Allergies: Patient has no known allergies.    Current Outpatient Medications   Medication Sig    acyclovir (ZOVIRAX) 400 MG tablet Take 1 tablet (400 mg total) by mouth 2 (two) times daily.    carvediloL (COREG) 6.25 MG tablet Take 1 tablet (6.25 mg total) by mouth 2 (two) times daily.    cilostazoL (PLETAL) 50 MG  Tab Take 1 tablet (50 mg total) by mouth 2 (two) times daily.    gabapentin (NEURONTIN) 300 MG capsule Take 1 capsule (300 mg total) by mouth 3 (three) times daily.    levoFLOXacin (LEVAQUIN) 500 MG tablet Take 1 tablet (500 mg total) by mouth once daily.    ondansetron (ZOFRAN) 8 MG tablet Take 1 tablet (8 mg total) by mouth every 8 (eight) hours as needed for Nausea.    pantoprazole (PROTONIX) 40 MG tablet Take 1 tablet (40 mg total) by mouth once daily.    promethazine (PHENERGAN) 25 MG tablet Take 1 tablet (25 mg total) by mouth every 8 (eight) hours as needed for Nausea.    venetoclax (VENCLEXTA) 100 mg Tab Take 1 tablet (100 mg) by mouth once daily on days 1-7 of a 28-day cycle. Do not discard any remaining tablets.    voriconazole (VFEND) 200 MG Tab Take 1 tablet (200 mg total) by mouth 2 (two) times daily.    zolpidem (AMBIEN) 10 mg Tab Take 1 tablet (10 mg total) by mouth nightly as needed.     No current facility-administered medications for this visit.       Immunization History   Administered Date(s) Administered    COVID-19, MRNA, LN-S, PF (MODERNA FULL 0.5 ML DOSE) 02/08/2021, 03/08/2021, 10/29/2021    Influenza (FLUAD) - Quadrivalent - Adjuvanted - PF *Preferred* (65+) 10/19/2021, 11/08/2023    Pneumococcal Conjugate - 13 Valent 10/19/2021    Pneumococcal Conjugate - 20 Valent 11/08/2023    RSVpreF (Arexvy) 11/08/2023     Family History:    Family History   Problem Relation Name Age of Onset    Hypertension Mother      Cancer Father      Cancer Brother 9     No Known Problems Daughter      No Known Problems Daughter      No Known Problems Son       Social History     Substance and Sexual Activity   Alcohol Use Not Currently      Social History     Substance and Sexual Activity   Drug Use Never      Social History     Socioeconomic History    Marital status:    Tobacco Use    Smoking status: Former     Current packs/day: 0.00     Average packs/day: 0.3 packs/day for 50.0 years (12.5 ttl pk-yrs)      Types: Cigarettes     Start date: 3/1/1973     Quit date: 3/1/2023     Years since quittin.2     Passive exposure: Past    Smokeless tobacco: Never   Substance and Sexual Activity    Alcohol use: Not Currently    Drug use: Never    Sexual activity: Not Currently     Partners: Female     Social Determinants of Health     Financial Resource Strain: Low Risk  (2023)    Overall Financial Resource Strain (CARDIA)     Difficulty of Paying Living Expenses: Not hard at all   Food Insecurity: Low Risk  (2024)    Received from [facility]     Within the past 12 months, the food you bought just didn't last and you didn't have money to get more.: 3     Within the past 12 months, you worried that your food would run out before you got money to buy more.: 3   Transportation Needs: Not At Risk (2024)    Received from [facility] Transportation Needs     In the past 12 months, has lack of reliable transportation kept you from medical appointments, meetings, work or from getting things needed for daily living?: No   Physical Activity: Insufficiently Active (2023)    Exercise Vital Sign     Days of Exercise per Week: 3 days     Minutes of Exercise per Session: 20 min   Stress: No Stress Concern Present (2023)    Citizen of [place] of Occupational [employer] - Occupational Stress Questionnaire     Feeling of Stress : Not at all   Housing Stability: Not At Risk (2024)    Received from [facility] Housing     What is your living situation today?: I have a steady place to live     Think about the place you live. Do you have problems with any of the following?: None of the above     Review of Systems   Constitutional:  Negative for chills, diaphoresis, fever, malaise/fatigue and weight loss.   HENT:  Negative for congestion, ear discharge, ear pain, hearing loss, nosebleeds, sinus pain, sore throat and tinnitus.    Eyes:  Negative for blurred vision, double vision,  "photophobia, pain, discharge and redness.   Respiratory:  Negative for cough, hemoptysis, sputum production, shortness of breath, wheezing and stridor.    Cardiovascular:  Negative for chest pain, palpitations, orthopnea, claudication, leg swelling and PND.   Gastrointestinal:  Negative for abdominal pain, blood in stool, constipation, diarrhea, heartburn, melena, nausea and vomiting.   Genitourinary:  Negative for dysuria, flank pain, frequency, hematuria and urgency.   Musculoskeletal:  Negative for back pain, falls, joint pain, myalgias and neck pain.   Skin:  Negative for itching and rash.   Neurological:  Negative for dizziness, tingling, tremors, sensory change, speech change, focal weakness, seizures, loss of consciousness, weakness and headaches.   Endo/Heme/Allergies:  Negative for environmental allergies and polydipsia. Does not bruise/bleed easily.   Psychiatric/Behavioral:  Negative for depression, hallucinations, memory loss, substance abuse and suicidal ideas. The patient is not nervous/anxious and does not have insomnia.      Vitals  /62 (BP Location: Left arm, Patient Position: Sitting, BP Method: Medium (Automatic))   Pulse 91   Temp 98.2 °F (36.8 °C) (Oral)   Resp 16   Ht 5' 8" (1.727 m)   Wt 75.3 kg (166 lb)   SpO2 97%   BMI 25.24 kg/m²   Physical Exam  Vitals and nursing note reviewed.   Constitutional:       General: He is not in acute distress.     Appearance: He is well-developed. He is not diaphoretic.   HENT:      Head: Normocephalic and atraumatic.      Nose: Nose normal.      Mouth/Throat:      Pharynx: No oropharyngeal exudate.   Eyes:      General: No scleral icterus.     Conjunctiva/sclera: Conjunctivae normal.      Pupils: Pupils are equal, round, and reactive to light.   Neck:      Thyroid: No thyromegaly.      Vascular: No JVD.   Cardiovascular:      Rate and Rhythm: Normal rate and regular rhythm.      Heart sounds: Normal heart sounds. No murmur heard.     No friction " rub. No gallop.   Pulmonary:      Effort: Pulmonary effort is normal. No respiratory distress.      Breath sounds: Normal breath sounds. No stridor. No wheezing or rales.   Abdominal:      General: Bowel sounds are normal. There is no distension.      Palpations: Abdomen is soft.      Tenderness: There is no abdominal tenderness.   Musculoskeletal:         General: Normal range of motion.      Cervical back: Normal range of motion and neck supple.   Skin:     General: Skin is warm and dry.      Findings: No erythema.   Neurological:      Mental Status: He is alert and oriented to person, place, and time.      Cranial Nerves: No cranial nerve deficit.   Psychiatric:         Judgment: Judgment normal.         Labs:  Lab Visit on 05/09/2024   Component Date Value    Lactoferrin, stool 05/09/2024 Negative     Giardia Antigen - EIA 05/09/2024 Negative     Cryptosporidium Antigen 05/09/2024 Negative     Stool Culture 05/09/2024 No enteric miles     Stool Culture 05/09/2024 No Salmonella,Shigella,Vibrio,Campylobacter,Yersinia isolated.     Stool Exam-Ova,Cysts,Par* 05/09/2024 FINAL 05/14/2024 1228    Lab Visit on 05/09/2024   Component Date Value    ABO 05/09/2024 B     Rh Type 05/09/2024 POS     Antibody Screen 05/09/2024 NEG     UNIT NUMBER 05/09/2024 [id number]     Product Code 05/09/2024 [id number]     DISPENSE STATUS 05/09/2024 TRANSFUSED     CODING SYSTEM 05/09/2024 [id number]     Unit Blood Type Code 05/09/2024 7300     Unit Blood Type 05/09/2024 B POS     Unit Expiration 05/09/2024 [id number]     CROSSMATCH INTERPRETATION 05/09/2024 Compatible            4/30/2024     9:38 AM   Pulmonary Function Tests   FVC 3.28 liters   FEV1 2.5 liters   DLCO (ml/mmHg sec) 10.11 ml/mmHg sec   FVC% 81   FEV1% 81   FEF 25-75 1.95   FEF 25-75% 81   DLCO% 39         5/15/2024     2:23 PM   6MW   6MWT Status completed without stopping   Patient Reported Chest pain;Dyspnea;Leg pain   Was O2 used? No   6MW Distance walked (feet) 1250  feet   Distance walked (meters) 381 meters   Did patient stop? No   Oxygen Saturation 97 %   Supplemental Oxygen Room Air   Heart Rate 99 bpm   Blood Pressure 133/77   Yisel Dyspnea Rating  very light   Oxygen Saturation 96 %   Supplemental Oxygen Room Air   Heart Rate 121 bpm   Blood Pressure 130/80   [place] Dyspnea Rating  somewhat heavy   Recovery Time (seconds) 120 seconds   Oxygen Saturation 99 %   Supplemental Oxygen Room Air   Heart Rate 100 bpm       Imaging:  Results for orders placed during the hospital encounter of 04/30/24    X-Ray Chest PA And Lateral    Narrative  EXAMINATION:  XR CHEST PA AND LATERAL    CLINICAL HISTORY:  Myelodysplastic syndrome, unspecified    TECHNIQUE:  PA and lateral views of the chest were performed.    COMPARISON:  None    FINDINGS:  Cardiac size is normal.  Few fibrotic streaks can be seen in the middle lobes in the lingula is but I see no infiltrate or pleural effusion.    Impression  See above      Electronically signed by: Mitchell Madden MD  Date:    04/30/2024  Time:    14:41    Results for orders placed during the hospital encounter of 05/15/24    CT Chest Without Contrast    Narrative  EXAMINATION:  CT CHEST WITHOUT CONTRAST    CLINICAL HISTORY:  clearance for stem cell transplant with history of lung nodule and emphysema; Myelodysplastic syndrome, unspecified    TECHNIQUE:  Low dose axial images, sagittal and coronal reformations were obtained from the thoracic inlet to the lung bases.  Intravenous contrast was not administered.    COMPARISON:  CT chest lung screening low-dose 11/28/2022    FINDINGS:  Base of Neck: Imaged portions of the base of the neck are grossly unremarkable.    Aorta: 2-branch vessel configuration of a left-sided aortic arch with the brachiocephalic trunk and left common carotid artery arising from a common origin.  No hyperdense crescent sign, aneurysmal degeneration, periaortic fat stranding or periaortic fluid.    Heart: Heart is normal in size.   No significant pericardial thickening. Scant coronary artery calcifications.    Pulmonary vasculature: Pulmonary arteries are not enlarged and distribute normally.  There are four pulmonary veins.    Axilla/Karina/Mediastinum: No axillary or mediastinal lymphadenopathy.  Evaluation of hilar lymph nodes is limited without IV contrast.    Airways: Trachea and mainstem bronchi are patent.    Lungs/Pleura: 0.2-cm ground-glass nodule left upper lobe (4:167), not significantly changed.  0.5-cm groundglass nodule in the right lower lobe (4:201), not significantly changed.  Symmetric minimal paraseptal emphysematous changes with apical predominance, not significantly changed.    Esophagus: Normal in course and caliber however, evaluation is limited given the lack of oral contrast.    Soft tissues: Imaged soft tissues are grossly unremarkable.    Osseous structures: Multilevel degenerative changes of the imaged spine.  No acute displaced fracture, dislocation or suspicious lytic/blastic osseous lesion.    Upper Abdomen: Trace perihepatic ascites.  Otherwise, imaged portions of the upper abdomen are grossly unremarkable.    Impression  1. No acute/emergent intrathoracic findings appreciated.  2. 0.2-cm ground-glass nodule left upper lobe and, 0.5-cm groundglass nodule in the right lower lobe (4:201), not significantly changed.  3. Symmetric minimal paraseptal emphysematous changes with apical predominance, not significantly changed.      Electronically signed by: Jenaro Husain  Date:    05/16/2024  Time:    09:55        Cardiodiagnostics:  Results for orders placed during the hospital encounter of 04/30/24    Echo    Interpretation Summary    Left Ventricle: The left ventricle is normal in size. Normal wall thickness. There is normal systolic function with a visually estimated ejection fraction of 60 - 65%. There is normal diastolic function.    Right Ventricle: Normal right ventricular cavity size. Wall thickness is normal.  Systolic function is normal.    Pulmonary Artery: The estimated pulmonary artery systolic pressure is 23 mmHg.    IVC/SVC: Normal venous pressure at 3 mmHg.        Assessment:  1. Abnormal PFT    2. Pulmonary emphysema, unspecified emphysema type    3. Myelodysplastic syndrome    4. Stem cell transplant candidate      Plan:   Sent for abnormal PFTs found during SCT work-up.  Spirometry normal.  Low DLCO.  No PH on TTE.  No desats on 6MWT.  CT chest shows some very mild upper zone emphysema.  Low DLCO most likely related to testing difficulty and anemia.  No concerns from a pulmonary standpoint with regards to proceeding with SCT.  CT with mild emphysematous changes.  Previous smoker but has quit.  No respiratory complaints.  No need for bronchodilators.  Continue with smoking cessation.  Continue with heme/onc follow-up for SCT.  Follow-up prn.      Evelyne Hatch D.O.  Pulmonary/Critical Care and Lung Transplantation  [facility]

## 2024-05-17 NOTE — TELEPHONE ENCOUNTER
ABILIO (w/  Iggy #[id number]) in regards to scheduling f/u nutrition appt w/ Rony Delgadillo RD.       [place], [place] ext [phone]

## 2024-05-21 ENCOUNTER — OFFICE VISIT (OUTPATIENT)
Dept: GASTROENTEROLOGY | Facility: CLINIC | Age: 69
End: 2024-05-21
Payer: MEDICARE

## 2024-05-21 ENCOUNTER — TELEPHONE (OUTPATIENT)
Dept: GASTROENTEROLOGY | Facility: CLINIC | Age: 69
End: 2024-05-21

## 2024-05-21 ENCOUNTER — OFFICE VISIT (OUTPATIENT)
Dept: PSYCHIATRY | Facility: CLINIC | Age: 69
End: 2024-05-21
Payer: MEDICARE

## 2024-05-21 ENCOUNTER — INFUSION (OUTPATIENT)
Dept: INFUSION THERAPY | Facility: HOSPITAL | Age: 69
End: 2024-05-21
Payer: MEDICARE

## 2024-05-21 ENCOUNTER — TELEPHONE (OUTPATIENT)
Dept: HEMATOLOGY/ONCOLOGY | Facility: CLINIC | Age: 69
End: 2024-05-21

## 2024-05-21 ENCOUNTER — PATIENT MESSAGE (OUTPATIENT)
Dept: HEMATOLOGY/ONCOLOGY | Facility: CLINIC | Age: 69
End: 2024-05-21

## 2024-05-21 ENCOUNTER — OFFICE VISIT (OUTPATIENT)
Dept: HEMATOLOGY/ONCOLOGY | Facility: CLINIC | Age: 69
End: 2024-05-21
Payer: MEDICARE

## 2024-05-21 VITALS
DIASTOLIC BLOOD PRESSURE: 55 MMHG | OXYGEN SATURATION: 100 % | RESPIRATION RATE: 18 BRPM | SYSTOLIC BLOOD PRESSURE: 109 MMHG | HEART RATE: 76 BPM | TEMPERATURE: 98 F

## 2024-05-21 VITALS
BODY MASS INDEX: 25.23 KG/M2 | SYSTOLIC BLOOD PRESSURE: 116 MMHG | TEMPERATURE: 99 F | DIASTOLIC BLOOD PRESSURE: 65 MMHG | HEART RATE: 94 BPM | WEIGHT: 166.44 LBS | OXYGEN SATURATION: 97 % | HEIGHT: 68 IN

## 2024-05-21 VITALS — HEIGHT: 68 IN | BODY MASS INDEX: 25.13 KG/M2 | WEIGHT: 165.81 LBS

## 2024-05-21 DIAGNOSIS — D61.818 PANCYTOPENIA: ICD-10-CM

## 2024-05-21 DIAGNOSIS — Z01.818 ENCOUNTER FOR PRE-TRANSPLANT EVALUATION FOR STEM CELL TRANSPLANT: ICD-10-CM

## 2024-05-21 DIAGNOSIS — K52.9 CHRONIC DIARRHEA: Primary | ICD-10-CM

## 2024-05-21 DIAGNOSIS — D46.9 MYELODYSPLASTIC SYNDROME: ICD-10-CM

## 2024-05-21 DIAGNOSIS — Z76.82 STEM CELL TRANSPLANT CANDIDATE: ICD-10-CM

## 2024-05-21 DIAGNOSIS — D46.9 MYELODYSPLASTIC SYNDROME: Primary | ICD-10-CM

## 2024-05-21 DIAGNOSIS — Z86.010 PERSONAL HISTORY OF COLONIC POLYPS: ICD-10-CM

## 2024-05-21 DIAGNOSIS — K20.90 ESOPHAGITIS: ICD-10-CM

## 2024-05-21 DIAGNOSIS — K20.80 LOS ANGELES GRADE B ESOPHAGITIS: ICD-10-CM

## 2024-05-21 DIAGNOSIS — I73.9 PERIPHERAL VASCULAR DISEASE: ICD-10-CM

## 2024-05-21 DIAGNOSIS — Z76.82 STEM CELL TRANSPLANT CANDIDATE: Primary | ICD-10-CM

## 2024-05-21 DIAGNOSIS — K92.1 BLOOD IN STOOL: ICD-10-CM

## 2024-05-21 PROCEDURE — 86920 COMPATIBILITY TEST SPIN: CPT

## 2024-05-21 PROCEDURE — P9040 RBC LEUKOREDUCED IRRADIATED: HCPCS

## 2024-05-21 PROCEDURE — 99999 PR PBB SHADOW E&M-EST. PATIENT-LVL I: CPT | Mod: PBBFAC,,, | Performed by: CASE MANAGER/CARE COORDINATOR

## 2024-05-21 PROCEDURE — 99999 PR PBB SHADOW E&M-EST. PATIENT-LVL III: CPT | Mod: PBBFAC,,, | Performed by: INTERNAL MEDICINE

## 2024-05-21 PROCEDURE — 36430 TRANSFUSION BLD/BLD COMPNT: CPT

## 2024-05-21 PROCEDURE — 99999 PR PBB SHADOW E&M-EST. PATIENT-LVL IV: CPT | Mod: PBBFAC,,, | Performed by: INTERNAL MEDICINE

## 2024-05-21 PROCEDURE — 25000003 PHARM REV CODE 250

## 2024-05-21 RX ORDER — ACETAMINOPHEN 325 MG/1
650 TABLET ORAL
Status: CANCELLED | OUTPATIENT
Start: 2024-05-21

## 2024-05-21 RX ORDER — HYDROCODONE BITARTRATE AND ACETAMINOPHEN 500; 5 MG/1; MG/1
TABLET ORAL ONCE
Status: CANCELLED | OUTPATIENT
Start: 2024-05-21 | End: 2024-05-21

## 2024-05-21 RX ORDER — HYDROCODONE BITARTRATE AND ACETAMINOPHEN 500; 5 MG/1; MG/1
TABLET ORAL ONCE
Status: DISCONTINUED | OUTPATIENT
Start: 2024-05-21 | End: 2024-05-21 | Stop reason: HOSPADM

## 2024-05-21 RX ORDER — ACETAMINOPHEN 325 MG/1
650 TABLET ORAL
Status: COMPLETED | OUTPATIENT
Start: 2024-05-21 | End: 2024-05-21

## 2024-05-21 RX ORDER — SODIUM, POTASSIUM,MAG SULFATES 17.5-3.13G
1 SOLUTION, RECONSTITUTED, ORAL ORAL DAILY
Qty: 1 KIT | Refills: 0 | Status: SHIPPED | OUTPATIENT
Start: 2024-05-21 | End: 2024-05-23

## 2024-05-21 RX ADMIN — ACETAMINOPHEN 650 MG: 325 TABLET ORAL at 03:05

## 2024-05-21 NOTE — Clinical Note
Matti Goel, I need to chat with you about him. He was admitted 2 months ago in [place] with fevers, confusion, and GI bleed. Esophageal biopsies were questionable for an infectious etiology? Overall he is better. He is seeing Magdaleno Navarro to repeat EGD and colonoscopy because I didn't want to move forward with allo transplant without a better understanding as to what was going on. Do you want to see him? Also anything you would want to add to biopsies? Thanks!  [place]

## 2024-05-21 NOTE — PROGRESS NOTES
Psycho-Oncology Pre-Transplant Evaluation  Psychiatry Initial Visit (PsyD)  Psychological Intake and Assessment    Date:  5/21/2024     CPT Code: 08432 (1hr) , 13860 (1hr), 09646 (1hr) Evaluation Length (direct face-to-face time):   1.25 hours    Total Time including report writing, chart review, integration of data and feedback: 2.5 hours       Referred by:  BMT Team/ Oncologist: Mohit Hickey MD.     Chief complaint/reason for encounter:  Psychological Evaluation prior to stem cell transplantation    Clinical status of patient: Outpatient    Guillaume Salinas, a 68 y.o. male, was seen for initial evaluation visit.  Met with patient. His primary care physician is Kal Hargrove MD.    INFORMED CONSENT/ LIMITS of CONFIDENTIALITY: Prior to beginning the interview, the patient's identification was confirmed using two identifiers. Guillaume Salinas   was informed of the possible risks and benefits of psychological interventions (e.g., counseling, psychotherapy, testing) and provided information regarding the handling of protected health records and the limits of confidentiality, including the importance of reporting any suicidal or homicidal ideation to ensure safety of all parties. This provider explained the purpose of today's appointment and the patient was provided with time to ask questions regarding this information.  Acceptance and understanding of these conditions was expressed, and Guillaume Salinas freely consented to this evaluation.      Lina Peabody was present for language interpretive services with patient's consent.     Psychological Intake  Medical/Surgical History:   Patient Active Problem List   Diagnosis    Hyperlipidemia    Iliac artery stenosis, right    Coronary artery disease involving native coronary artery of native heart without angina pectoris    Chest pain    Personal history of nicotine dependence    Hypertension, essential     Colon polyps    Aortic atherosclerosis    Abnormal sensation of leg    Atherosclerosis of native artery of both lower extremities with intermittent claudication    Macrocytic anemia    Myelodysplastic syndrome    Thrombocytopenia    Pancytopenia    Immunodeficiency secondary to neoplasm    Chronic kidney disease, stage 3a    B12 deficiency        Health Behaviors:       ETOH Use: No       Tobacco Use: Patient shared that he quit smoking over a year ago.   Illicit Drug Use:  No     Prescription Misuse:No   Caffeine: Patient noted he drinks about 3 cups of coffee daily but noted this has been a decrease of caffeine intake for him.   Exercise:Patient noted some walking.   Firearms:  No   Advanced directives:No     Family History:   Psychiatric illness: No     Alcohol/Drug Abuse: No     Suicide: No      Past Psychiatric History:   Inpatient treatment: No     Outpatient treatment: No     Prior substance abuse treatment: No     Suicide Attempts: No      Psychotropic Medications:  Current: Ambien         Current medications as per below, allergies reviewed in chart.  Current Outpatient Medications   Medication    acyclovir (ZOVIRAX) 400 MG tablet    carvediloL (COREG) 6.25 MG tablet    cilostazoL (PLETAL) 50 MG Tab    gabapentin (NEURONTIN) 300 MG capsule    levoFLOXacin (LEVAQUIN) 500 MG tablet    ondansetron (ZOFRAN) 8 MG tablet    pantoprazole (PROTONIX) 40 MG tablet    promethazine (PHENERGAN) 25 MG tablet    sodium,potassium,mag sulfates (SUPREP BOWEL PREP KIT) 17.5-3.13-1.6 gram SolR    venetoclax (VENCLEXTA) 100 mg Tab    voriconazole (VFEND) 200 MG Tab    zolpidem (AMBIEN) 10 mg Tab     No current facility-administered medications for this visit.         Social situation/Stressors:Guillaume Salinas is an 68 y.o. male referred by  Dr. Hickey  for pre-transplant evaluation.  Guillaume Salinas lives with his wife in [place], [place]. He shared that he was born in [facility] and moved to  in 2001.  He shared that he retired 3 years ago. 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  The patient reports that he does have adequate availability of time for the procedure and recovery.  Guillaume Salinas has been  since 1972 and has 3 adult children.  His spouse is also reportedly retired.   The patient reports good social support from his wife and other family members.  Patient's wife will be present and available to assist the patient during his recovery period.   Guillaume Salinas is  reportedly Yazdanism . Guillaume Salinas shared that he used to enjoy gardening and painting. He noted he still tends to garden but not as much. The patient has no  history.    Additional stressors: Patient did note decreased appetite and pain in his buttock area and lower back.     Strengths: Able to vocalize needs and patient reported good social supports   Liabilities: Patient noted sleep can be affected by restless leg syndrome    History of present illness:   Oncology History   Myelodysplastic syndrome   5/12/2023 Initial Diagnosis    Myelodysplastic syndrome     6/12/2023 -  Chemotherapy    Treatment Summary   Plan Name: OP AZACITIDINE 7-DAY (SUB-Q)  Treatment Goal: Supportive  Status: Active  Start Date: 6/12/2023  End Date: 5/24/2024 (Planned)  Provider: Harry Diamond MD  Chemotherapy: azaCITIDine (VIDAZA) chemo injection 140 mg, 75 mg/m2 = 140 mg, Subcutaneous, Clinic/HOD 1 time, 8 of 10 cycles  Administration: 140 mg (6/12/2023), 140 mg (6/13/2023), 140 mg (6/19/2023), 140 mg (6/14/2023), 140 mg (6/15/2023), 140 mg (6/16/2023), 140 mg (6/20/2023), 140 mg (7/10/2023), 140 mg (7/11/2023), 140 mg (7/12/2023), 140 mg (7/13/2023), 140 mg (7/14/2023), 140 mg (8/14/2023), 140 mg (8/15/2023), 140 mg (8/16/2023), 140 mg (8/17/2023), 140 mg (8/18/2023), 140 mg (9/18/2023), 140 mg (9/19/2023), 140 mg (10/23/2023), 140 mg (10/24/2023), 140 mg (10/25/2023), 140  mg (10/26/2023), 140 mg (10/27/2023), 140 mg (9/20/2023), 140 mg (9/21/2023), 140 mg (9/22/2023), 140 mg (12/11/2023), 140 mg (12/12/2023), 140 mg (12/13/2023), 140 mg (12/14/2023), 140 mg (12/15/2023), 140 mg (1/8/2024), 140 mg (1/9/2024), 140 mg (1/10/2024), 140 mg (1/11/2024), 140 mg (1/12/2024), 140 mg (3/4/2024), 140 mg (3/5/2024), 140 mg (3/6/2024), 140 mg (3/7/2024), 140 mg (3/8/2024)         Guillaume Salinas has adjusted to illness fairly well primarily through focus on family and his checo . He has engaged in appropriate information gathering.  The patient has good family support.  His family is coping well with the diagnosis/treatment/prognosis.    Illness-related psychosocial stressors include changes in ability to engage in leisure activities as he needs to keep himself from getting sick. These stressors will not prevent patient from adhering to post-transplant requirements.  The patient has an good partnership with his [facility] oncology treatment team. No barriers reported at this time.    Stem Cell Transplantation (SCT):  Guillaume Salinas possesses a fair level of knowledge about SCT gleaned from discussions with his clinical team. He noted acknowledgement that his transplant date will be pushed back at this time. Patient reported basic yet adequate knowledge of his illness.  Guillaume Salinas is not fully knowledgeable about the possible risks and complications of the procedure and the behavioral changes which will be required of him.  He has anticipated his recovery needs and has planned adequate time away from work, assistance from his wife, and residence at home to facilitate healing. Guillaume Salinas is aware of the requirement that HSCT patients must stay within 1 hour of the hospital for their first 100 days post-transplant.  Guillaume Salinas knows he must commit to careful monitoring of symptoms, the possibility of a complex long-term multiple drug  treatment regimen, and long term follow-up visits with his oncologist (as required) following the procedure.  He is aware of the following necessary behavioral changes:changes in food selection, preparation, and storage, increased vigilance with home cleanliness , careful personal and dental hygiene , and rapid return to physical activity. The patient reports good compliance with medical treatment in the past.  Guillaume Salinas has realistic expectations of health and illness possibilities following SCT. He is not fully aware of possible medical side effects and noted he needs to talk with his transplant team regarding this.  He is aware of the risk of mortality and noted he would like to also talk about this further with his transplant team. He also anticipates social strains including decreased ability to participate in some leisure and social activities for some time and the strains of care-giving demands on friends/family.        Current Symptoms:  Mood: denied mood difficulties; decreased appetite; fatigue; no SI/HI;   Yesy: Denies   Psychosis: Denies  Anxiety: denied   Generalized anxiety: Denies       Panic Disorder: Denies  Social/specific phobia: Denies   OCD: Denies  Substance abuse: denied  Is the patient willing to submit to a random drug screen?  N/A  Cognitive functioning:  No difficulties reported  Health behaviors: Restless leg causing difficulties with sleep  Can the patient identify own medications and describe purpose and proper dosing? Yes  Sleep: Patient noted that his sleep can be affected by restless leg difficulties.  Pain: Mr. Betsy Salinas rated his level of pain today as 5/10 today. He noted this presents in buttock and lower back area.  Trauma: Denies  Sexual Dysfunction:  Denies   Head Injury History: Denies  Personality Functioning: The patient does not display any personality characteristics which would be an impediment to receiving BMT.      Psychological  Assessment/Testing:     Distress thermometer:       5/21/2024     1:33 PM 5/3/2024    10:19 AM 3/4/2024     3:04 PM 3/1/2024    10:43 AM 1/8/2024    10:53 AM 10/24/2023     8:39 AM 9/8/2023    10:18 AM   DISTRESS SCREENING   Distress Score 0 - No Distress 3 0 - No Distress 0 - No Distress 0 - No Distress 0 - No Distress    Practical Concerns  None of these  None of these None of these None of these None of these   Social Concerns None of these None of these  None of these None of these None of these None of these   Emotional Concerns None of these None of these  None of these None of these None of these None of these   Retire Spiritual or Restoration Concerns      No No   Spiritual or Restoration Concerns None of these None of these  None of these None of these     Physical Concerns Pain;Sleep;Fatigue;Changes in eating Pain;Changes in eating;Sleep;Fatigue  None of these None of these None of these    5/21/2024 with interpretive help      PHQ ANSWERS    Q1. Little interest or pleasure in doing things: Not at all (05/21/24 1408)  Q2. Feeling down, depressed, or hopeless: Not at all (05/21/24 1408)  Q3. Trouble falling or staying asleep, or sleeping too much: More than half the days (05/21/24 1408)  Q4. Feeling tired or having little energy: More than half the days (05/21/24 1408)  Q5. Poor appetite or overeating: More than half the days (05/21/24 1408)  Q6. Feeling bad about yourself - or that you are a failure or have let yourself or your family down: Not at all (05/21/24 1408)  Q7. Trouble concentrating on things, such as reading the newspaper or watching television: Not at all (05/21/24 1408)  Q8. Moving or speaking so slowly that other people could have noticed. Or the opposite - being so fidgety or restless that you have been moving around a lot more than usual: More than half the days (05/21/24 1408)  Q9. Thoughts that you would be better off dead, or of hurting yourself in some way: Not at all (05/21/24  1408)    PHQ8 Score : 8 (05/21/24 1408)  PHQ-9 Total Score: 8 (05/21/24 1408)   This was completed by  interpreting the questions.  The patient completed the Patient Health Questionnaire-9 (PHQ-9) Depression Screen and received a score of 8, indicating mild depression symptoms presently impacting current functioning.      AWILDA-7 Answers        5/21/2024     2:03 PM   GAD7   1. Feeling nervous, anxious, or on edge? 0   2. Not being able to stop or control worrying? 0   3. Worrying too much about different things? 0   4. Trouble relaxing? 0   5. Being so restless that it is hard to sit still? 1   6. Becoming easily annoyed or irritable? 0   7. Feeling afraid as if something awful might happen? 0   AWILDA-7 Score 1     AWILDA-7 Score: 1  Interpretation: Normal   This was completed by  interpreting the questions.  The patient completed the General Anxiety Disorder, 7 Items (GAD7) and received a score of 1, indicating no anxiety symptoms presently impacting current functioning.      PCS: This was not completed during session.    SLUMS  The [employer] Mental Status Examination (UMS), a cognitive screener, was administered to assess the Patient's current mental status and cognitive capacity. Lebanese version of assessment was administered with aid of . Patient obtained a score of 28/30. This score does fall within normal limits as compared to those within similar age and level of education, and suggests no impairments in neurocognitive functioning.   Patient only missed points on one question and this may be related to non-cognitive reasons as patient shared that he was born in [facility] and moved [place] the [place] in [place].    REALM-R:   The Rapid Estimate of Adult Literacy in Medicine, Revised (REALM-R) is a brief screening instrument used to assess an adult patient's ability to read common medical words. It is designed to assist medical professionals in identifying  patients at risk for poor literacy skills.  This assessment was not completed due to patient being Persian speaker.      [facility] BEHAVIORAL MEDICINE DIAGNOSTIC (MBMD)                                    The MBMD provides an assessment of the potential role of psychiatric factors in a patient's disease and treatment. Guillaume Salinas produced a valid MBMD profile. Validity indices suggested some concerns in the area of desirability though not enough to invalidate results. The elevation of the desirability scale suggests that Guillaume Salinas may be reporting to ensure that the patient is not presented in a negative light.  All additional scales will be interpreted with this information in mind.    The patient's profile suggests the presence of no depression,  no anxiety, no cognitive issues, no difficulty with moodiness or mood swings, and no to mild apprehension to being open with others about these issues. There are no indications he may struggle with overeating, overuse of EtOH, overuse of caffeine, use of substances, inactivity, or tobacco use when under stress.     In regard to COPING STYLES, patient presented with no elevations on scales of Inhibited (PS=10) and Dejected (PS=10). He obtained no to mild elevations on scales of Introversive (PS=60), Cooperative (PS=65), Sociable (PS=68), Confident (PS=74), Nonconforming (PS=53), Forceful (PS=50), Oppositional (PS=50), and Denigrated (PS=45). Patient did present with marked significant elevation on scale of Respectful (PS=90). Individuals who score high in this area tend to be cooperative, however they may also tend to keep their emotions to themselves.     Guillaume Salinas obtained no elevations on the STRESS MODERATORS  scales of Social Isolation (PS=30) and Spiritual Absence (PS=10). He obtained no to mild elevations on the scales of Pain Sensitivity (PS=74) and Future Pessimism (PS=71). He obtained moderate elevation in area of  Illness Apprehension (PS=81) and marked significant elevation in area of Funcitonal Deficits (PS=85). Individuals who score higher in these areas tend to be focused or aware of changes in their body in a way that can lead them to being able to monitor and note to providers significant changes in symptomology or may lead to them focusing on less important sensations in a form of rumination. They may also see themselves as unable to perform day to day activities.    Scores on the TREATMENT PROGNOSTIC scales reveal no elevations on the scales of Interventional Fragility (PS=10) and Problematic Compliance (PS=24). He demonstrated no to mild elevations on the scales of Medication Abuse (PS=45), Information Discomfort (PS=70), and Utilization Excess (PS=36).    Scores on the MANAGEMENT GUIDES indices reveal no to mild elevation in area of Adjustment Difficulties (PS=69) and no elevation in area of Psych Referral (PS=25).    It should be noted that, given Guillaume Salinas's possible reluctance to provide information which might paint him in a negative light, future, unanticipated distress may arise.     Attempted to call patient on 5/24/2024 with [place]  Ameena ID#[id number] to review results on MBMD with patient, however patient did not answer the phone. A voicemail was left with help from Ameena informing patient that I would attempt to call him back next week and that nothing on the assessment results suggested reasons for additional delay of transplant.    Called patient on 5/28 with Azeri  Landen ID# [id number] and reviewed results of MBMD with patient over the phone.    Mental Status Exam:    General appearance:  appears stated age, neatly dressed, well groomed  Level of cooperation:  cooperative  Thought processes:  logical, goal-directed   Speech: normal in rate, volume, and tone    Mood: euthymic  Affect: mood congruent and appropriate  Thought content:  no illusions,  no visual hallucinations, no auditory hallucinations, no delusions, no active or passive homicidal thoughts, no active or passive suicidal ideation, no obsessions, no compulsions, no violence  Orientation:  oriented to person, place, and time  Memory:  Recent memory:  5 of 5 objects after brief delay.    Remote memory - intact  Attention span and concentration:  Patient attended to session without distraction  Abstract reasoning:  No gross deficits appeared to present during session.  Judgment and insight: good   Language:  Intact      SUMMARY:  Guillaume Salinas is a  68 y.o. male referred by Dr. Hickey for psychological evaluation prior to stem cell transplantation.  The patient appears absent of disabling psychopathology or disabilities which would prevent understanding and compliance with medical treatment.  There is no evidence of suicidality.   The patient has fair knowledge about HSCT, appropriate expectations for health and illness following transplantation, inadequate  understanding of the possible risks and complications of this treatment option, and a high willingness to sustain effort for lifestyle changes and health adaptations which will be required of him. He is aware of the 100 day 1 hour residence requirement.  Patient noted he knows of risk of mortality but is unaware of other possible side effects/adverse reactions. He noted he wants to know more about these.  He reports adequate compliance with previous medical treatment.  Guillaume Salinas has good social support from his wife and family. Caregiver will reportedly be patient's wife.  The patient exhibits an adequate degree of social stability.  The patient has experience using coping mechanisms to deal with stress.   The patient acknowledges no stressors expected to limit his ability to cope with the demands of HSCT and recovery.  The patient reports no alcohol use and no illicit drug use  Patient shared that he quit smoking  over a year ago.  Patient reportedly has 3 cups of coffee daily and noted that this is a recent decrease for him.  He does not demonstrate impairment in cognitive functioning.      IMPRESSIONS AND RECOMMENDATIONS  Guillaume Salinas is an acceptable HSCT candidate from a psychological perspective with mild risk. Patient's MBMD profile suggests patient may be focused or aware of changes in their body in a way that can lead him to being able to monitor and note to providers significant changes in symptomology effectively or may lead to him focusing on less important sensations in a form of rumination. He may also see himself as having difficulties performing daily tasks. Patient also answered in a manner that suggested that he is cooperative, however that he may tend to keep his emotions to himself. Patient would likely benefit from being asked straightforward questions about symptomology to better assess his progress. This should also be done with the aid of a  to ensure understanding of the questions. There are no overt psychological contraindications for proceeding with the procedure.He has no significant mental health history, and reports no current psychiatric problems or major adjustment issues.  The patient has reasonable expectations for the procedure, good social support, and has already begun making appropriate life plans in anticipation of the procedure. The patient has verbalized appropriate awareness and commitment to the necessary behavioral changes associated with HSCT and appears willing to adjust to long-term lifestyle challenges and medical follow-up. Patient is a Sinhala speaker, thus it is important to use interpretive services when communicating with patient to ensure understanding. Patient noted he is not fully aware of possible adverse reactions/side effects at this time. This should be discussed with patient and asked about to ensure adequate understanding prior to  him giving consent for procedure. Patient noted he knows that there is a risk of mortality, however this should also be reviewed in discussion about possible adverse reactions/side effects.    1. Myelodysplastic syndrome    2. Stem cell transplant candidate    3. Encounter for pre-transplant evaluation for stem cell transplant             Giles Magana Psy.D.  Clinical Psychologist  [place] License #[id number]  [place] License #[id number]

## 2024-05-21 NOTE — PATIENT INSTRUCTIONS
SUPREP Instructions    [facility] [address]  Clinic Office [phone]  Endoscopy Lab [phone]    You are scheduled for a Colonoscopy with Dr. Navarro  on 06/25/2024 at [facility] in [place].  Someone from the hospital will call you 7 days prior to the procedure with an arrival time.  Check in at the Hospital -1st floor, Information desk.   Call [phone] to reschedule.    An adult friend/family member must come with you to drive you home.  You cannot drive, take a taxi, Uber/Lyft or bus to leave the Endoscopy Center alone.  If you do not have someone to drive you home, your test will be cancelled.     Please follow the directions of your doctor if you take any pills that thin your blood. If you take these meds: Aggrenox, Brilinta, Effient, Eliquis, Lovenox, Plavix, Pletal, Pradaxa, Ticilid, Xarelto or Coumadin, let the doctor's office know.    Please hold any GLP-1 medications prior to the procedure: Dulaglutide Trulicity(hold week prior), Exenatide Byetta (hold the morning of procedure), Semaglutide Ozempic (hold week prior), Liraglutide Victoza, Saxenda(hold week prior), Lixisenatide Adlyxin (hold the morning of procedure), Semaglutide Rybelsus (hold the morning of procedure), Tirzepatide Mounjaro (hold week prior)     DON'T: On the morning of the test do not take insulin or pills for diabetes.     DO: On the morning of the test, do take any pills for blood pressure, heart, anti-rejection and or seizures with a small sip of water. Bring any inhalers with you.    To have a good prep, you must follow these instructions - please do not use the directions from the pharmacy.    The doctor will send a prescription for the SUPREP.      The Day Before the test:    How to take prep:    SUPREP Bowel Prep Kit is a (2-day) prep.   Both 6-ounce bottles are required for a complete preparation for colonoscopy. Dilute the solution concentrate as directed prior to use. You  must drink water with each dose of SUPREP, and additional water after each dose.    DOSE 1--Day Before Colonoscopy    Drink at least 6 to 8 glasses of clear liquids from time you wake up until you begin your prep and then continue until bedtime to avoid dehydration.     You CAN have:  Water, Coffee or decaf coffee (no milk or cream)  Tea  Soft drinks - regular and sugar free  Jello (green or yellow)  Apple Juice, white grape juice, white cranberry juice  Gatorade, Power Aid, Crystal Light, Wilfred Aid  Lemonade and Limeade  Bouillon, clear soup  Snowball, popsicles  YOU CAN'T DRINK ANYTHING RED, PURPLE ORANGE OR BLUE   YOU CAN'T DRINK ALCOHOL  ONLY DRINK WHAT IS ON THE LIST      5:00 pm:    You must complete Steps 1 through 4 using one (1) 6-ounce bottle before going to bed as shown below:    Step 1-Pour ONE (1) 6-ounce bottle of SUPREP liquid into the mixing container.  Step 2-Add cool drinking water to the 16-ounce line on the container and mix.  Step 3-Drink ALL the liquid in the container.  Step 4-You must drink two (2) more 16-ounce containers of water over the next 1 hour.  IMPORTANT: If you experience preparation-related symptoms (for example, nausea, bloating, or cramping), stop, or slow the rate of drinking the additional water until your symptoms decrease.    DOSE 2--Day of the Colonoscopy _________________ at 2-3 AM.    For this dose, repeat Steps 1 through 4 shown above using the other 6-ounce bottle.   You may continue drinking water/clear liquids until   4 hours before your colonoscopy or as directed by the scheduling nurse      For information about your procedure, two (2) things to view prior to colonoscopy:  Please watch this informational video. It is important to watch this animated consent video prior to your arrival. If you haven't watched the video prior to arriving, you are required to watch it during admission which can causes delays.    Options for viewing:   Using a keyboard:  press and hold  the control tab (Ctrl) and left mouse click to follow links.           Colonoscopy Instructional Video                                                                                   OR    Type link address into your web browser's address bar:  [email or web address]      Educational Booklet with pictures:      Colonoscopy Prep - Liquid      Leave all valuables and jewelry at home. You will be at the hospital for 2-4 hours.    Call the Endoscopy department at [phone] with any questions about your procedure.

## 2024-05-21 NOTE — Clinical Note
Matti Mcdonald, We're going to delay transplant. I'm not entirely sure what we were dealing with in his esophagus, so Magdaleno Navarro is doing to arrange EGD/c-scope next month. Transplant is tentatively planned for July. I am going to ask our team to arrange a cycle of azacitidine alone this month to knock back his disease before transplant. Just wanted to keep you in the loop! Thanks, Paco

## 2024-05-21 NOTE — PROGRESS NOTES
GASTROENTEROLOGY CLINIC NOTE    Reason for visit: The primary encounter diagnosis was Chronic diarrhea. Diagnoses of Blood in stool, [place] grade B esophagitis, Myelodysplastic syndrome, Pancytopenia, and Personal history of colonic polyps were also pertinent to this visit.  Referring provider/PCP: Kal Hargrove MD    HPI:  Guillaume Salinas is a 68 y.o. male here today for evaluation anema, blood in stool, diarrhea, new patient  I was asked to urgently evaluate this patient given upcoming stem cell transplantation for history of myelodysplasia.  Daughter is here and wants to provide translation. ([facility] offered and they declined)    Patient is undergoing workup for stem cell transplantation for myelodysplasia.  I was asked to evaluate the patient by his current hematologist.    He developed a couple months ago symptoms of diarrhea, up to 6-8 stools a day, apparently there has been some blood in the stool that has been scant and off and on.  Complicating this is the patient has color blind list and it seems like he has not sure when he sees changes in the stool.  He did notice on a trip to [place] that the stool was very dark, not quite black but much darker in nature.  He went to the hospital and ultimately was admitted there.  I reviewed the records in care everywhere.    He had an upper endoscopy that showed grade B esophagitis, there was some concern that there may be a viral component to this.  I reviewed the pathology from that that did show acute inflammation, the comment on atypical cells.  It seems that they did studies for HPV which were ultimately negative.  It did not seem like there was a conclusion from the biopsies of the exact etiology of the esophagitis.    He continues with diarrhea and loose stools, he is hesitant to take Imodium because he does not want to become constipated.  It does not seem like he is having continuing gross bloody bowel movements.  He is taking  Protonix daily.    He is on cilostazol for a history of arterial disease.      Prior Endoscopy:  EGD:  4/2024 at [facility]   Findings:       LA Grade B (one or more mucosal breaks greater than 5 mm, not extending        between the tops of two mucosal folds) esophagitis with no bleeding was        found in the lower third of the esophagus. The esophagitis is about a        few cm above possible herpetic esophagitis or CMV esophagitis can not be        excluded. Biopsies were taken with a cold forceps for histology.        Verification of patient identification for the specimen was done.        Estimated blood loss was minimal. This could have been the site of the        bleeding at the current time it has not bleeding.        Diffuse moderate inflammation characterized by congestion (edema) and        erythema was found in the gastric antrum. Biopsies were taken with a        cold forceps for histology. Verification of patient identification for        the specimen was done. Estimated blood loss was minimal. No blood was        noted in the stomach        The examined duodenum was normal.   PATH  Final Diagnosis  A. Stomach, biopsy-  ? Chronic gastritis.  ? Negative for H pylori ([place]).  ? Negative for intestinal metaplasia.  ? Negative for dysplasia and malignancy.  B. Distal esophagus, biopsy-  ? Squamous mucosa with focal acute inflammation and atypical cells. See comment  ? No columnar mucosa present for evaluation.  ? Negative for intestinal metaplasia.  ? Negative for dysplasia and malignancy.    HPV panel was negative.    Colon:  2022 hodnnete  Impression:            - Two 4 to 5 mm polyps in the transverse colon,                          removed with a cold snare. Resected and retrieved.                          - One 4 mm polyp in the sigmoid colon, removed                          with a cold snare. Resected and retrieved.                          - Two 4 to 10 mm polyps in the rectum and in the                           distal sigmoid colon, removed with a hot snare.                          Resected and retrieved.                          - Diverticulosis in the sigmoid colon.   PATH  TAs    (Portions of this note were dictated using voice recognition software and may contain dictation related errors in spelling/grammar/syntax not found on text review)    Review of Systems   Constitutional:  Negative for fever and malaise/fatigue.   Respiratory:  Negative for cough and shortness of breath.    Cardiovascular:  Negative for chest pain and palpitations.   Gastrointestinal:  Positive for blood in stool, diarrhea and nausea. Negative for abdominal pain and vomiting.   Neurological:  Negative for dizziness and headaches.       Past Medical History: has a past medical history of Anticoagulant long-term use, Coronary artery disease, Hypertension, and Peripheral vascular disease, unspecified.    Past Surgical History: has a past surgical history that includes Colonoscopy (N/A, 01/05/2022) and Bone marrow biopsy (Left, 4/26/2023).    Family History:family history includes Cancer in his brother and father; Hypertension in his mother; No Known Problems in his daughter, daughter, and son.    Allergies: Review of patient's allergies indicates:  No Known Allergies    Social History: reports that he quit smoking about 14 months ago. His smoking use included cigarettes. He started smoking about 51 years ago. He has a 12.5 pack-year smoking history. He has been exposed to tobacco smoke. He has never used smokeless tobacco. He reports that he does not currently use alcohol. He reports that he does not use drugs.    Home medications:   Current Outpatient Medications on File Prior to Visit   Medication Sig Dispense Refill    acyclovir (ZOVIRAX) 400 MG tablet Take 1 tablet (400 mg total) by mouth 2 (two) times daily. 60 tablet 11    carvediloL (COREG) 6.25 MG tablet Take 1 tablet (6.25 mg total) by mouth 2 (two) times daily.  "180 tablet 3    cilostazoL (PLETAL) 50 MG Tab Take 1 tablet (50 mg total) by mouth 2 (two) times daily. 180 tablet 3    gabapentin (NEURONTIN) 300 MG capsule Take 1 capsule (300 mg total) by mouth 3 (three) times daily. (Patient taking differently: Take 600 mg by mouth 2 (two) times a day. 4 tabs total) 90 capsule 11    levoFLOXacin (LEVAQUIN) 500 MG tablet Take 1 tablet (500 mg total) by mouth once daily. 30 tablet 11    ondansetron (ZOFRAN) 8 MG tablet Take 1 tablet (8 mg total) by mouth every 8 (eight) hours as needed for Nausea. 30 tablet 3    pantoprazole (PROTONIX) 40 MG tablet Take 1 tablet (40 mg total) by mouth once daily. 30 tablet 3    promethazine (PHENERGAN) 25 MG tablet Take 1 tablet (25 mg total) by mouth every 8 (eight) hours as needed for Nausea. (Patient not taking: Reported on 5/21/2024) 40 tablet 5    venetoclax (VENCLEXTA) 100 mg Tab Take 1 tablet (100 mg) by mouth once daily on days 1-7 of a 28-day cycle. Do not discard any remaining tablets. 28 tablet 11    voriconazole (VFEND) 200 MG Tab Take 1 tablet (200 mg total) by mouth 2 (two) times daily. 60 tablet 11    zolpidem (AMBIEN) 10 mg Tab Take 1 tablet (10 mg total) by mouth nightly as needed. 30 tablet 0     No current facility-administered medications on file prior to visit.       Vital signs:  Ht 5' 8" (1.727 m)   Wt 75.2 kg (165 lb 12.6 oz)   BMI 25.21 kg/m²     Physical Exam  Vitals reviewed.   Constitutional:       Appearance: He is not toxic-appearing.   HENT:      Head: Normocephalic and atraumatic.   Eyes:      General: No scleral icterus.     Conjunctiva/sclera: Conjunctivae normal.   Neurological:      Mental Status: He is alert and oriented to person, place, and time.      Gait: Gait normal.   Psychiatric:         Mood and Affect: Mood normal.         Behavior: Behavior normal.         I have reviewed prior labs, imaging, notes from last month    Assessment:  1. Chronic diarrhea    2. Blood in stool    3. [place] grade B " esophagitis    4. Myelodysplastic syndrome    5. Pancytopenia    6. Personal history of colonic polyps      I had a long discussion with the referring physician over the phone today.  We reviewed the case in detail.    Ultimately the etiology of his symptoms are somewhat unclear, I am concerned about the findings of atypical cells in his esophageal biopsies, I do wonder as was suspected if there is some type of viral component to his symptoms given his pancytopenia.    I do believe we need further evaluation with an EGD and colonoscopy with pan biopsies.    We will order more stool testing to rule out other causes.    Plan:  Orders Placed This Encounter    Clostridium difficile EIA    Tissue Transglutaminase, IgA    IgA    C-Reactive Protein    Giardia / Cryptosporidum, EIA    Calprotectin, Stool    WBC, Stool    Pancreatic elastase, fecal    Fecal fat, qualitative    Stool Exam-Ova,Cysts,Parasites    sodium,potassium,mag sulfates (SUPREP BOWEL PREP KIT) 17.5-3.13-1.6 gram SolR    Case Request Endoscopy: EGD (ESOPHAGOGASTRODUODENOSCOPY), COLONOSCOPY       Egd and colon.    Pan biopsies.  Higher than avg risk, on pletal, and has pancytopenia, his hematologist will arrange plt and blood transfusion prior    Stool studies  labs      RTC based on above.    Magdaleno Navarro MD  [facility]

## 2024-05-21 NOTE — PROGRESS NOTES
Computer Administered Psychological Testing (75244)  Date:  5/21/2024     CPT Code: 99425 Evaluation Length:  20 minutes      Referred by:  BMT Team/ Oncologist: GERALD Hickey MD.     Chief complaint/reason for encounter:  Psychological Evaluation prior to stem cell transplantation      Guillaume Salinas is an 68 y.o. male referred by GERALD Hickey MD for pre-transplant evaluation.     Guillaume Salinas completed the computer-administered psychological testing of the [facility] Diagnostic (MBMD) promptly. Patient informed of the risks and benefits of testing, was instructed how to navigate the computer program, and was informed of the importance of accurate responding.  Patient expressed understanding and willingly engaged in the assessment.  The test was administered in Thai.    Risk Questions on the MBMD (Q49, Q86) were negative indicating the patient denied thoughts of suicide and denied that he cannot find things in life that were interesting/pleasurable.    Guillaume Salinas's complete assessment report will be included in the medical record with any additional testing instruments compiled with appropriate interview data, summary, and recommendations.  Patient will be provided feedback on the assessment results by the psychologist.       ICD-10-CM ICD-9-CM   1. Stem cell transplant candidate  Z76.82 V49.83   2. Encounter for pre-transplant evaluation for stem cell transplant  Z01.818 V72.83   3. Myelodysplastic syndrome  D46.9 238.75         Giles Magana Psy.D.  Clinical Psychologist  [place] License #[id number]  [place] License #[id number]

## 2024-05-21 NOTE — LETTER
May 24, 2024        Mohit Hickey MD  [address] [place] [id number]             [facility] - Psychiatry  [address] [place] [address]  Phone: [phone]  Fax: [phone]   Patient: Guillaume Salinas   MR Number: [id number]   YOB: 1955   Date of Visit: 5/21/2024       Dear Dr. Hickey:    Thank you for referring Guillaume Salinas to me for evaluation. Below are the relevant portions of my assessment and plan of care.    SUMMARY:  Guillaume Salinas is a  68 y.o. male referred by Dr. Hickey for psychological evaluation prior to stem cell transplantation.  The patient appears absent of disabling psychopathology or disabilities which would prevent understanding and compliance with medical treatment.  There is no evidence of suicidality.   The patient has fair knowledge about HSCT, appropriate expectations for health and illness following transplantation, inadequate  understanding of the possible risks and complications of this treatment option, and a high willingness to sustain effort for lifestyle changes and health adaptations which will be required of him. He is aware of the 100 day 1 hour residence requirement.  Patient noted he knows of risk of mortality but is unaware of other possible side effects/adverse reactions. He noted he wants to know more about these.  He reports adequate compliance with previous medical treatment.  Guillaume Salinas has good social support from his wife and family. Caregiver will reportedly be patient's wife.  The patient exhibits an adequate degree of social stability.  The patient has experience using coping mechanisms to deal with stress.   The patient acknowledges no stressors expected to limit his ability to cope with the demands of HSCT and recovery.  The patient reports no alcohol use and no illicit drug use  Patient shared that he quit smoking over a year ago.  Patient reportedly has 3  cups of coffee daily and noted that this is a recent decrease for him.  He does not demonstrate impairment in cognitive functioning.      IMPRESSIONS AND RECOMMENDATIONS  Guillaume Salinas is an acceptable HSCT candidate from a psychological perspective with mild risk. Patient's MBMD profile suggests patient may be focused or aware of changes in their body in a way that can lead him to being able to monitor and note to providers significant changes in symptomology effectively or may lead to him focusing on less important sensations in a form of rumination. He may also see himself as having difficulties performing daily tasks. Patient also answered in a manner that suggested that he is cooperative, however that he may tend to keep his emotions to himself. Patient would likely benefit from being asked straightforward questions about symptomology to better assess his progress. This should also be done with the aid of a  to ensure understanding of the questions. There are no overt psychological contraindications for proceeding with the procedure.He has no significant mental health history, and reports no current psychiatric problems or major adjustment issues.  The patient has reasonable expectations for the procedure, good social support, and has already begun making appropriate life plans in anticipation of the procedure. The patient has verbalized appropriate awareness and commitment to the necessary behavioral changes associated with HSCT and appears willing to adjust to long-term lifestyle challenges and medical follow-up. Patient is a Slovak speaker, thus it is important to use interpretive services when communicating with patient to ensure understanding. Patient noted he is not fully aware of possible adverse reactions/side effects at this time. This should be discussed with patient and asked about to ensure adequate understanding prior to him giving consent for procedure. Patient  noted he knows that there is a risk of mortality, however this should also be reviewed in discussion about possible adverse reactions/side effects.       If you have questions, please do not hesitate to call me.     Sincerely,      Giles Magana PsyD [facility] Melissa B. Piglia, RN      normal...

## 2024-05-21 NOTE — LETTER
May 21, 2024        Mohit Hickey MD  [address]  [facility] [address]             [facility]  [address]    [place] [address]  Phone: [phone]   Patient: Guillaume Salinas   MR Number: [id number]   YOB: 1955   Date of Visit: 5/21/2024       Dear Dr. Hickey:    Thank you for referring Guillaume Salinas to me for evaluation. Below are the relevant portions of my assessment and plan of care.            If you have questions, please do not hesitate to call me. I look forward to following Guillaume along with you.    Sincerely,      Magdaleno Navarro MD           CC    No Recipients

## 2024-05-21 NOTE — PLAN OF CARE
Pt received 1U PRBCs today and tolerated well, without complications. VSS throughout infusion. Educated patient about 1U PRBCs (indications, side effects, possible reactions, precautions) and verbalized understanding. PIV positive for blood return, saline locked and removed prior to DC, catheter tip intact. Pt DC with no distress noted, ambulated off of unit, w/ fx, w/o event, pleased. States feels better, no SOB.

## 2024-05-22 ENCOUNTER — TELEPHONE (OUTPATIENT)
Dept: INFUSION THERAPY | Facility: HOSPITAL | Age: 69
End: 2024-05-22
Payer: MEDICARE

## 2024-05-22 DIAGNOSIS — D84.81 IMMUNODEFICIENCY SECONDARY TO NEOPLASM: Primary | ICD-10-CM

## 2024-05-22 DIAGNOSIS — D49.9 IMMUNODEFICIENCY SECONDARY TO NEOPLASM: Primary | ICD-10-CM

## 2024-05-22 NOTE — PROGRESS NOTES
Section of Hematology and Stem Cell Transplantation  Follow Up Visit     Date of visit: 5/21/24  Visit diagnosis: Stem cell transplant candidate [Z76.82]  Referred by:  Harry Diamond MD    Oncologic History:     Primary Oncologic Diagnosis: Myelodysplastic syndrome excess blasts 2, IPSS-M very high risk    4/12/23: Initial evaluation by Dr. Diamond of anemia. WBC 2.71, Hgb 7.1, Plts  400. Prior to this visit, he was in [place] for a dental procedure. His symptoms of fatigue started after extractions. Workup by Dr. Diamond unremarkable.   4/26/23: Bone marrow biopsy revealed a hypercellular marrow (80-90%) with increased blasts (8-12%) concerning for MDS EB2. However, sample was limited.   5/23/23: Repeat bone marrow biopsy revealed myelodysplastic syndrome with excess blasts 2 (blasts 16-17%). 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 NGS with ASXL1 (44%), EZH2 (87%), IDH2 (42%), STAG2 (89%), U2AF1 (3%).  6/12/23: Cycle 1 of azacitidine 75 mg/m2 plus venetoclax x14 of 28 days.   7/3/23: Bone marrow biopsy at the end of cycle 1 revealed a hypocellular marrow, no blasts, trilineage hypoplasia and dyspoiesis with increased micromegakaryocytes. FISH with trisomy 8 (three copies of [id number]). NGS with ASXL1 (20%), EZH2 (40%), IDH2 (17%), STAG2 (38%).  9/6/23: Bone marrow biopsy revealed dysplastic changes but blasts were not increased. Diploid karyotype. NGS with ASXL1 (16%).  4/23/24: Bone marrow biopsy revealed a cellular marrow with megakaryocytic hypoplasia with micromegakaryocytes. 5% blasts by flow. FISH normal. NGS with ASXL1 (25%), EZH2 (45%), IDH2 (19%), RUNX1 (19%), STAG2 (37%).    History of Present Ilness:   Guillaume Salinas (Guillaume) is a pleasant 68 y.o.male with PVD, CAD, HTN who presents for follow up. He met with Dr. Navarro today. He denies recent loose stools. He has had occasional BRBPR. No other new symptoms.     PAST MEDICAL HISTORY:   Past Medical History:   Diagnosis Date    Anticoagulant  long-term use     Coronary artery disease     Hypertension     Myelodysplastic syndrome     Peripheral vascular disease, unspecified        PAST SURGICAL HISTORY:   Past Surgical History:   Procedure Laterality Date    BONE MARROW BIOPSY Left 2023    Procedure: Biopsy-bone marrow;  Surgeon: Harry Diamond MD;  Location: [facility] OR;  Service: Oncology;  Laterality: Left;    COLONOSCOPY N/A 2022    Procedure: COLONOSCOPY Golytely Vaccinated will bring cards;  Surgeon: Dereje Simon MD;  Location: [facility] ENDO;  Service: Endoscopy;  Laterality: N/A;  Do not cancel this order       PAST SOCIAL HISTORY:  Social History     Tobacco Use    Smoking status: Former     Current packs/day: 0.00     Average packs/day: 0.3 packs/day for 50.0 years (12.5 ttl pk-yrs)     Types: Cigarettes     Start date: 3/1/1973     Quit date: 3/1/2023     Years since quittin.2     Passive exposure: Past    Smokeless tobacco: Never   Substance Use Topics    Alcohol use: Not Currently    Drug use: Never       FAMILY HISTORY:  Family History   Problem Relation Name Age of Onset    Hypertension Mother      Cancer Father      Cancer Brother 9     No Known Problems Daughter      No Known Problems Daughter      No Known Problems Son         CURRENT MEDICATIONS:   Current Outpatient Medications   Medication Sig    acyclovir (ZOVIRAX) 400 MG tablet Take 1 tablet (400 mg total) by mouth 2 (two) times daily.    carvediloL (COREG) 6.25 MG tablet Take 1 tablet (6.25 mg total) by mouth 2 (two) times daily.    cilostazoL (PLETAL) 50 MG Tab Take 1 tablet (50 mg total) by mouth 2 (two) times daily.    gabapentin (NEURONTIN) 300 MG capsule Take 1 capsule (300 mg total) by mouth 3 (three) times daily. (Patient taking differently: Take 600 mg by mouth 2 (two) times a day. 4 tabs total)    levoFLOXacin (LEVAQUIN) 500 MG tablet Take 1 tablet (500 mg total) by mouth once daily.    ondansetron (ZOFRAN) 8 MG tablet Take 1 tablet (8 mg total) by mouth  every 8 (eight) hours as needed for Nausea.    pantoprazole (PROTONIX) 40 MG tablet Take 1 tablet (40 mg total) by mouth once daily.    sodium,potassium,mag sulfates (SUPREP BOWEL PREP KIT) 17.5-3.13-1.6 gram SolR Take 177 mLs by mouth once daily. for 2 days    venetoclax (VENCLEXTA) 100 mg Tab Take 1 tablet (100 mg) by mouth once daily on days 1-7 of a 28-day cycle. Do not discard any remaining tablets.    voriconazole (VFEND) 200 MG Tab Take 1 tablet (200 mg total) by mouth 2 (two) times daily.    zolpidem (AMBIEN) 10 mg Tab Take 1 tablet (10 mg total) by mouth nightly as needed.    promethazine (PHENERGAN) 25 MG tablet Take 1 tablet (25 mg total) by mouth every 8 (eight) hours as needed for Nausea. (Patient not taking: Reported on 5/21/2024)     No current facility-administered medications for this visit.       ALLERGIES:   Review of patient's allergies indicates:  No Known Allergies    Review of Systems:     Pertinent positives and negatives included in the HPI. Otherwise a complete review of systems is negative.    Physical Exam:     Vitals:    05/21/24 1039   BP: 116/65   Pulse: 94   Temp: 99.2 °F (37.3 °C)     General: Appears well, NAD  Pulmonary: CTAB, no increased work of breathing, no W/R/C  Cardiovascular: S1S2 normal, RRR, no M/R/G  Abdominal: Soft, NT, ND, BS+, no HSM  Extremities: No C/C/E  Neurological: AAOx4, grossly normal, no focal deficits  Dermatologic: No appreciable rashes or lesions     ECOG Performance Status: (foot note - ECOG PS provided by [employer]cology [employer]) 1 - Symptomatic but completely ambulatory    Karnofsky Performance Score:  80%- Normal Activity with Effort: Some Symptoms of Disease    Labs:   Lab Results   Component Value Date    WBC 2.71 (L) 05/21/2024    RBC 2.11 (L) 05/21/2024    HGB 6.9 (L) 05/21/2024    HCT 21.7 (L) 05/21/2024     (H) 05/21/2024    MCH 32.7 (H) 05/21/2024    MCHC 31.8 (L) 05/21/2024    RDW 21.2 (H) 05/21/2024    PLT 19 (LL) 05/21/2024     MPV 10.5 05/21/2024    GRAN 1.1 (L) 05/21/2024    GRAN 38.8 05/21/2024    LYMPH 1.2 05/21/2024    LYMPH 45.8 05/21/2024    MONO 0.2 (L) 05/21/2024    MONO 6.6 05/21/2024    EOS 0.2 05/21/2024    BASO 0.02 05/21/2024    EOSINOPHIL 5.9 05/21/2024    BASOPHIL 0.7 05/21/2024       CMP  Sodium   Date Value Ref Range Status   05/21/2024 142 136 - 145 mmol/L Final     Potassium   Date Value Ref Range Status   05/21/2024 3.6 3.5 - 5.1 mmol/L Final     Chloride   Date Value Ref Range Status   05/21/2024 106 95 - 110 mmol/L Final     CO2   Date Value Ref Range Status   05/21/2024 28 23 - 29 mmol/L Final     Glucose   Date Value Ref Range Status   05/21/2024 92 70 - 110 mg/dL Final     BUN   Date Value Ref Range Status   05/21/2024 10 8 - 23 mg/dL Final     Creatinine   Date Value Ref Range Status   05/21/2024 0.8 0.5 - 1.4 mg/dL Final     Calcium   Date Value Ref Range Status   05/21/2024 9.3 8.7 - 10.5 mg/dL Final     Total Protein   Date Value Ref Range Status   05/21/2024 6.5 6.0 - 8.4 g/dL Final     Albumin   Date Value Ref Range Status   05/21/2024 3.6 3.5 - 5.2 g/dL Final     Total Bilirubin   Date Value Ref Range Status   05/21/2024 0.4 0.1 - 1.0 mg/dL Final     Comment:     For infants and newborns, interpretation of results should be based  on gestational age, weight and in agreement with clinical  observations.    Premature Infant recommended reference ranges:  Up to 24 hours.............<8.0 mg/dL  Up to 48 hours............<12.0 mg/dL  3-5 days..................<15.0 mg/dL  6-29 days.................<15.0 mg/dL       Alkaline Phosphatase   Date Value Ref Range Status   05/21/2024 73 55 - 135 U/L Final     AST   Date Value Ref Range Status   05/21/2024 14 10 - 40 U/L Final     ALT   Date Value Ref Range Status   05/21/2024 11 10 - 44 U/L Final     Anion Gap   Date Value Ref Range Status   05/21/2024 8 8 - 16 mmol/L Final     eGFR if    Date Value Ref Range Status   08/27/2021 >60 >60 mL/min/1.73  m^2 Final   08/27/2021 >60 >60 mL/min/1.73 m^2 Final   08/27/2021 >60 >60 mL/min/1.73 m^2 Final     eGFR if non    Date Value Ref Range Status   08/27/2021 57 (A) >60 mL/min/1.73 m^2 Final     Comment:     Calculation used to obtain the estimated glomerular filtration  rate (eGFR) is the CKD-EPI equation.      08/27/2021 57 (A) >60 mL/min/1.73 m^2 Final     Comment:     Calculation used to obtain the estimated glomerular filtration  rate (eGFR) is the CKD-EPI equation.      08/27/2021 57 (A) >60 mL/min/1.73 m^2 Final     Comment:     Calculation used to obtain the estimated glomerular filtration  rate (eGFR) is the CKD-EPI equation.          Imaging:   No results found for this or any previous visit (from the past 2160 hour(s)).    Pathology:  Reviewed     Assessment and Plan:   Guillaume Salinas (Guillaume) is a pleasant 68 y.o.male with PVD, CAD, HTN who presents for follow up.    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 NGS with ASXL1 (44%), EZH2 (87%), IDH2 (42%), STAG2 (89%), U2AF1 (3%). He started treatment with azacitidine 75 mg/m2 daily x7 days plus venetoclax 100mg (voriconazole) daily x14 of 28 days. Venetoclax decreased to 7 days with cycle 3. Bone marrow biopsy 9/6/23 revealed dysplastic changes but blasts were not increased. Diploid karyotype. NGS with ASXL1 (16%). Repeat bone marrow biopsy on 4/23/24 noted persistent MDS without increased blasts. NGS with ASXL1 (25%), EZH2 (45%), IDH2 (19%), RUNX1 (19%), STAG2 (37%).  Will give 1 cycle of azacitidine 75 mg/m2 x5 days.    Stem cell transplant candidate: We discussed the role for allogeneic stem cell transplantation in this disease process as a potentially curative option. We had an extensive discussion about the rationale, logistics, risks, and benefits. We reviewed the requirement to stay in the [place] area for 100 days with a caregiver at all times. We  discussed the risks, including infection, graft failure, organ toxicity, graft versus host disease, relapse of disease, and secondary cancers. We reviewed the need for long-term immunosuppression and need for close monitoring. HCT-CI of 1 (intermediate risk). Will hold transplant for now while evaluating GI symptoms. Tentative plans for transplant in July 2024.  Coordinator: Fay Stephens  Regimen:  + 2Gy TBI  Donor: son (haplo)   Graft source: BM    Esophagitis: He had biopsies of the esophagus in [place] - path possibly infectious? Symptoms improved. EGD/colonoscopy with Dr. Navarro 6/25/24. Will discuss with Dr. Samuel regarding additional testing.     Pancytopenia: Related to MDS. Monitor weekly. Transfuse for Hgb <7 or Plts <10.  Will arrange PRBC and platelet transfusion around 6/23 or 6/24 (prior to EGD/C-scope).    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     Peripheral vascular disease: He is still taking cilostazol daily. He was previously seeing Dr. Garibay, but he has not had follow up since then.   ASAP referral to cardiology to discuss stopping cilostazol.     Orders/Follow Up:      Orders Placed This Encounter    Ambulatory referral/consult to Cardiology       Route Chart for Scheduling    BMT Chart Routing  Urgent    Follow up with physician 4 weeks.   Follow up with [facility]    Provider visit type    Infusion scheduling note   please arrange infusion appt in [place] on 6/24 for platelet and/or blood transfusion   Injection scheduling note azacitidine x5 days ASAP in [place] (schedule only 1 cycle for now)   Labs CBC, CMP, CMV, phosphorus, magnesium and type and screen   Scheduling:  Preferred lab:  Lab interval: once a week  weekly labs in [place] - *please include CMV with next labs   Imaging    Pharmacy appointment    Other referrals       Additional referrals needed  cardiology ASAP           Treatment Plan Information   OP AZACITIDINE  7-DAY (SUB-Q)   Harry Diamond MD   Upcoming Treatment Dates - OP AZACITIDINE 7-DAY (SUB-Q)    4/22/2024       Pre-Medications       ondansetron tablet 16 mg       Chemotherapy       azaCITIDine (VIDAZA) chemo injection 140 mg  4/23/2024       Pre-Medications       ondansetron tablet 16 mg       Chemotherapy       azaCITIDine (VIDAZA) chemo injection 140 mg  4/24/2024       Pre-Medications       ondansetron tablet 16 mg       Chemotherapy       azaCITIDine (VIDAZA) chemo injection 140 mg  4/25/2024       Pre-Medications       ondansetron tablet 16 mg       Chemotherapy       azaCITIDine (VIDAZA) chemo injection 140 mg    Therapy Plan Information  cyanocobalamin injection 1,000 mcg  1,000 mcg, Intramuscular, Every visit      Total time of this visit was 40 minutes, including time spent face to face with patient, and also in preparing for today's visit for MDM and documentation. (Medical Decision Making, including consideration of possible diagnoses, management options, complex medical record review, review of diagnostic tests and information, consideration and discussion of significant complications based on comorbidities, and discussion with providers involved with the care of the patient). Greater than 50% was spent face to face with the patient counseling and coordinating care.  Visit today included increased complexity associated with the care of the episodic problem esophagitis and peripheral vascular disease addressed and managing the longitudinal care of the patient due to the serious and/or complex managed problem(s) myelodysplastic syndrome, stem cell transplant candidate, pancytopenia, immunodeficiency.    Paco Hickey MD  Hematology, Oncology, and Stem Cell Transplantation  [facility]

## 2024-05-22 NOTE — TELEPHONE ENCOUNTER
Confirmed upcoming infusion appointments with the patient's daughter Susy.   Labs on 5/30; vidaza to resume the week of 6/3. Also confirmed cardiology appt with Dr. Lara on 6/6 at 11a

## 2024-05-23 ENCOUNTER — TELEPHONE (OUTPATIENT)
Dept: PSYCHIATRY | Facility: CLINIC | Age: 69
End: 2024-05-23
Payer: MEDICARE

## 2024-05-23 ENCOUNTER — TELEPHONE (OUTPATIENT)
Dept: CARDIOLOGY | Facility: CLINIC | Age: 69
End: 2024-05-23
Payer: MEDICARE

## 2024-05-23 DIAGNOSIS — R19.7 DIARRHEA, UNSPECIFIED TYPE: Primary | ICD-10-CM

## 2024-05-23 RX ORDER — VANCOMYCIN HYDROCHLORIDE 125 MG/1
125 CAPSULE ORAL 4 TIMES DAILY
Qty: 56 CAPSULE | Refills: 0 | Status: SHIPPED | OUTPATIENT
Start: 2024-05-23 | End: 2024-06-06

## 2024-05-23 NOTE — TELEPHONE ENCOUNTER
Spoke to patient daughter Susy. Daughter would like to keep sooner yessy with Dr. Lara for 06/06 @11am, was offer an yessy with Dr. Martinez for 06/11. Patient daughter stated he does not need a Sinhala speaking DrTabitha Since she will we translating.     Qiana FLOYD

## 2024-05-23 NOTE — TELEPHONE ENCOUNTER
Called patient with [facility] as  from language services as reminder to complete Q-global assessment online. Patient noted that he had talked about it last night and plans on completing it soon. Patient noted he had an upset stomach last night. He stated he did not feel that he needed to go to ED at this time. Patient was encouraged to go to ED and/or communicate with his medical team if it gets worse and patient agreed.      Giles Magana Psy.D.  Clinical Psychologist  [place] License #[id number]  [place] License #[id number]

## 2024-05-23 NOTE — PROGRESS NOTES
68yrM who is a close relative of Dr. Jethro Thomas referred to be to coordinate expedited colonoscopy due to severe diarrhea. This patient has a 3 week history of severe diarrhea 8 times per day associated with fecal urgency and nocturnal symptoms. Images of his stool are revealing for small amounts of red blood.   This presentation is suspicious for colitis due to either C diff vs chemotherapy-induced colitis (azacitidine) vs ischemic colitis   Proceed with empiric vancomycin now  Change colonoscopy date to May 30th with me if ok with Dr. Navarro.     Nathen Casiano MD   [facility]

## 2024-05-24 ENCOUNTER — TELEPHONE (OUTPATIENT)
Dept: PSYCHIATRY | Facility: CLINIC | Age: 69
End: 2024-05-24
Payer: MEDICARE

## 2024-05-24 ENCOUNTER — LAB VISIT (OUTPATIENT)
Dept: LAB | Facility: HOSPITAL | Age: 69
End: 2024-05-24
Attending: INTERNAL MEDICINE
Payer: MEDICARE

## 2024-05-24 DIAGNOSIS — D49.9 IMMUNODEFICIENCY SECONDARY TO NEOPLASM: ICD-10-CM

## 2024-05-24 DIAGNOSIS — D84.81 IMMUNODEFICIENCY SECONDARY TO NEOPLASM: ICD-10-CM

## 2024-05-24 DIAGNOSIS — K20.80 LOS ANGELES GRADE B ESOPHAGITIS: ICD-10-CM

## 2024-05-24 DIAGNOSIS — K92.1 BLOOD IN STOOL: ICD-10-CM

## 2024-05-24 DIAGNOSIS — K52.9 CHRONIC DIARRHEA: ICD-10-CM

## 2024-05-24 LAB — WBC #/AREA STL HPF: NORMAL /[HPF]

## 2024-05-24 PROCEDURE — 83993 ASSAY FOR CALPROTECTIN FECAL: CPT | Mod: BTX | Performed by: INTERNAL MEDICINE

## 2024-05-24 PROCEDURE — 82653 EL-1 FECAL QUANTITATIVE: CPT | Performed by: INTERNAL MEDICINE

## 2024-05-24 PROCEDURE — 87329 GIARDIA AG IA: CPT | Performed by: INTERNAL MEDICINE

## 2024-05-24 PROCEDURE — 87209 SMEAR COMPLEX STAIN: CPT | Mod: BTX | Performed by: INTERNAL MEDICINE

## 2024-05-24 PROCEDURE — 82705 FATS/LIPIDS FECES QUAL: CPT | Performed by: INTERNAL MEDICINE

## 2024-05-24 PROCEDURE — 89055 LEUKOCYTE ASSESSMENT FECAL: CPT | Mod: BTX | Performed by: INTERNAL MEDICINE

## 2024-05-24 NOTE — TELEPHONE ENCOUNTER
Attempted to call patient with  [facility] ID# [id number] to review MBMD results with patient. Patient did not answer the phone, thus a voicemail was left with help from [facility] stating nothing from assessment results suggest reason for additional delay of transplant and that I would call back next week to review results with patient.      Giles Magana Psy.D.  Clinical Psychologist  [place] License #[id number]  [place] License #[id number]

## 2024-05-25 RX ORDER — VORICONAZOLE 200 MG/1
200 TABLET, FILM COATED ORAL 2 TIMES DAILY
Qty: 60 TABLET | Refills: 11 | Status: SHIPPED | OUTPATIENT
Start: 2024-05-25 | End: 2025-05-20

## 2024-05-26 LAB
CRYPTOSP AG STL QL IA: NEGATIVE
FAT STL QL: NORMAL
G LAMBLIA AG STL QL IA: NEGATIVE
NEUTRAL FAT STL QL: NORMAL

## 2024-05-27 RX ORDER — LEVOFLOXACIN 500 MG/1
500 TABLET, FILM COATED ORAL DAILY
Qty: 30 TABLET | Refills: 11 | Status: SHIPPED | OUTPATIENT
Start: 2024-05-27

## 2024-05-27 RX ORDER — ACYCLOVIR 400 MG/1
400 TABLET ORAL 2 TIMES DAILY
Qty: 60 TABLET | Refills: 11 | Status: SHIPPED | OUTPATIENT
Start: 2024-05-27

## 2024-05-28 ENCOUNTER — TELEPHONE (OUTPATIENT)
Dept: PSYCHIATRY | Facility: CLINIC | Age: 69
End: 2024-05-28
Payer: MEDICARE

## 2024-05-28 ENCOUNTER — TELEPHONE (OUTPATIENT)
Dept: GASTROENTEROLOGY | Facility: HOSPITAL | Age: 69
End: 2024-05-28
Payer: MEDICARE

## 2024-05-28 ENCOUNTER — OFFICE VISIT (OUTPATIENT)
Dept: INFECTIOUS DISEASES | Facility: CLINIC | Age: 69
End: 2024-05-28
Payer: MEDICARE

## 2024-05-28 VITALS
HEART RATE: 97 BPM | SYSTOLIC BLOOD PRESSURE: 126 MMHG | WEIGHT: 167.13 LBS | HEIGHT: 68 IN | BODY MASS INDEX: 25.33 KG/M2 | TEMPERATURE: 99 F | DIASTOLIC BLOOD PRESSURE: 71 MMHG

## 2024-05-28 DIAGNOSIS — A49.8 CLOSTRIDIUM DIFFICILE INFECTION: ICD-10-CM

## 2024-05-28 DIAGNOSIS — A09 INFECTIOUS DIARRHEA: ICD-10-CM

## 2024-05-28 DIAGNOSIS — M79.2 NEUROPATHIC PAIN: ICD-10-CM

## 2024-05-28 DIAGNOSIS — I10 PRIMARY HYPERTENSION: ICD-10-CM

## 2024-05-28 DIAGNOSIS — A04.72 C. DIFFICILE COLITIS: Primary | ICD-10-CM

## 2024-05-28 DIAGNOSIS — K20.90 ESOPHAGITIS: Primary | ICD-10-CM

## 2024-05-28 DIAGNOSIS — D49.9 IMMUNODEFICIENCY SECONDARY TO NEOPLASM: ICD-10-CM

## 2024-05-28 DIAGNOSIS — D46.9 MYELODYSPLASTIC SYNDROME: ICD-10-CM

## 2024-05-28 DIAGNOSIS — I25.10 CORONARY ARTERY DISEASE INVOLVING NATIVE CORONARY ARTERY OF NATIVE HEART WITHOUT ANGINA PECTORIS: ICD-10-CM

## 2024-05-28 DIAGNOSIS — D84.81 IMMUNODEFICIENCY SECONDARY TO NEOPLASM: ICD-10-CM

## 2024-05-28 LAB — CALPROTECTIN STL-MCNT: 29.4 MCG/G

## 2024-05-28 PROCEDURE — 99999 PR PBB SHADOW E&M-EST. PATIENT-LVL IV: CPT | Mod: PBBFAC,,, | Performed by: INTERNAL MEDICINE

## 2024-05-28 RX ORDER — GABAPENTIN 300 MG/1
300 CAPSULE ORAL 3 TIMES DAILY
Qty: 90 CAPSULE | Refills: 11 | Status: SHIPPED | OUTPATIENT
Start: 2024-05-28

## 2024-05-28 RX ORDER — CARVEDILOL 6.25 MG/1
6.25 TABLET ORAL 2 TIMES DAILY
Qty: 180 TABLET | Refills: 3 | Status: SHIPPED | OUTPATIENT
Start: 2024-05-28

## 2024-05-28 RX ORDER — VANCOMYCIN HYDROCHLORIDE 125 MG/1
125 CAPSULE ORAL 4 TIMES DAILY
Qty: 56 CAPSULE | Refills: 0 | Status: SHIPPED | OUTPATIENT
Start: 2024-05-28 | End: 2024-06-11

## 2024-05-28 NOTE — TELEPHONE ENCOUNTER
Patient with progressive resolution of diarrhea on vancomycin which is typical for a clinical diagnosis of C diff infection. Due to his typical response, I recommend we cancel his colonoscopy as the risk of this procedure outweighs potential for benefit.     Plan:   Continue vancomycin for a total of 28 days (extended course) due to high risk of relapse in immunocompromised state   Wife should be tested for C diff and treated if positive   Recent EGD from 4-8-2024 reviewed including review of images from endoscopy. Findings include mild reflux esophagitis and mild gastric erythema (mild gastritis). These findings should not require repeat endoscopy for follow up     Proceed with BM transplant evaluation/treatment without restriction from a GI standpoint and without the need for additional GI testing at this time.     I will submit a lab request for wife to be tested for C diff as she has also experienced diarrhea recently and would serve as a reservoir for recurrent infection at home if she is a carrier.     Nathen Casiano MD   [facility]

## 2024-05-28 NOTE — PROGRESS NOTES
Encounter performed using Sujey transplator    Subjective:      Chief Complaint: Esophagitis    History of Present Illness  68M with histpry of CAD, MDS on aza/nilda with plans for stem cell transplant presents for evaluation of esophagitis.    Patient presented to OSH in [place] with neutropenic fever and dark stools. He had an EGD which showed esophagitis, biopsy notable for inflammation, but no malignancy.     Of note, patient's main complaint is having diarrhea since discharge from his hospital. Reports having 15 episodes of diarrhea per day. Notes abdominal cramping prior to diarrhea. He was seen by Dr. Flores and he was started empirically on vancomycin PO for C difficile.    Patient reports since starting vancomycin, his stools have formed, only having 3-4 episodes per day.    Patient denied any fevers, chills, night sweats, cough, SOB, abdominal pain, poor appetite.  Denied having any exposure to tuberculosis.    His main complaint is pain around low back, particularly when he sits for a long time    Social History:  From [facility], moved to [place] in 2001  Retired for 3 years  Used to work in hotel doing laundry, apartment building    No pets  Son's house 2 dogs  Mows lawn and garden    Traveled to [facility] 2 times in last year, November 2023, February 2024        Review of Systems   Constitutional:  Negative for chills, diaphoresis, fever and weight loss.   HENT:  Negative for congestion, sinus pain and sore throat.    Eyes:  Negative for photophobia and pain.   Respiratory:  Negative for cough, sputum production and shortness of breath.    Cardiovascular:  Negative for chest pain and leg swelling.   Gastrointestinal:  Negative for abdominal pain, diarrhea, nausea and vomiting.   Genitourinary:  Negative for dysuria and hematuria.   Musculoskeletal:  Negative for joint pain.   Skin:  Negative for rash.   Neurological:  Negative for focal weakness and headaches.   Psychiatric/Behavioral:  Negative for  depression. The patient is not nervous/anxious.              Objective:   Physical Exam  Constitutional:       General: He is not in acute distress.     Appearance: He is well-developed. He is not diaphoretic.   HENT:      Head: Normocephalic and atraumatic.      Nose: Nose normal.   Eyes:      Conjunctiva/sclera: Conjunctivae normal.   Pulmonary:      Effort: Pulmonary effort is normal. No respiratory distress.   Abdominal:      General: Abdomen is flat. There is no distension.      Tenderness: There is no abdominal tenderness. There is no guarding.   Musculoskeletal:         General: Normal range of motion.      Cervical back: Normal range of motion.      Right lower leg: No edema.      Left lower leg: No edema.      Comments: Focal tenderness around SI joint, otherwise, good ROM in bilateral hips   Skin:     General: Skin is warm and dry.      Findings: No erythema or rash.   Neurological:      Mental Status: He is alert.   Psychiatric:         Behavior: Behavior normal.           Significant results reviewed:    Sodium   Date Value Ref Range Status   05/21/2024 142 136 - 145 mmol/L Final   05/16/2024 142 136 - 145 mmol/L Final   05/08/2024 139 136 - 145 mmol/L Final      Potassium   Date Value Ref Range Status   05/21/2024 3.6 3.5 - 5.1 mmol/L Final   05/16/2024 4.4 3.5 - 5.1 mmol/L Final   05/08/2024 3.9 3.5 - 5.1 mmol/L Final      Chloride   Date Value Ref Range Status   05/21/2024 106 95 - 110 mmol/L Final   05/16/2024 107 95 - 110 mmol/L Final   05/08/2024 106 95 - 110 mmol/L Final      CO2   Date Value Ref Range Status   05/21/2024 28 23 - 29 mmol/L Final   05/16/2024 26 23 - 29 mmol/L Final   05/08/2024 24 23 - 29 mmol/L Final      BUN   Date Value Ref Range Status   05/21/2024 10 8 - 23 mg/dL Final   05/16/2024 8 8 - 23 mg/dL Final   05/08/2024 10 8 - 23 mg/dL Final      Creatinine   Date Value Ref Range Status   05/21/2024 0.8 0.5 - 1.4 mg/dL Final   05/16/2024 0.9 0.5 - 1.4 mg/dL Final   05/08/2024 1.0  0.5 - 1.4 mg/dL Final      Glucose   Date Value Ref Range Status   05/21/2024 92 70 - 110 mg/dL Final   05/16/2024 127 (H) 70 - 110 mg/dL Final   05/08/2024 94 70 - 110 mg/dL Final       ALT   Date Value Ref Range Status   05/21/2024 11 10 - 44 U/L Final   05/16/2024 12 10 - 44 U/L Final   05/08/2024 11 10 - 44 U/L Final      AST   Date Value Ref Range Status   05/21/2024 14 10 - 40 U/L Final   05/16/2024 13 10 - 40 U/L Final   05/08/2024 13 10 - 40 U/L Final      Total Bilirubin   Date Value Ref Range Status   05/21/2024 0.4 0.1 - 1.0 mg/dL Final     Comment:     For infants and newborns, interpretation of results should be based  on gestational age, weight and in agreement with clinical  observations.    Premature Infant recommended reference ranges:  Up to 24 hours.............<8.0 mg/dL  Up to 48 hours............<12.0 mg/dL  3-5 days..................<15.0 mg/dL  6-29 days.................<15.0 mg/dL     05/16/2024 0.5 0.1 - 1.0 mg/dL Final     Comment:     For infants and newborns, interpretation of results should be based  on gestational age, weight and in agreement with clinical  observations.    Premature Infant recommended reference ranges:  Up to 24 hours.............<8.0 mg/dL  Up to 48 hours............<12.0 mg/dL  3-5 days..................<15.0 mg/dL  6-29 days.................<15.0 mg/dL     05/08/2024 0.3 0.1 - 1.0 mg/dL Final     Comment:     For infants and newborns, interpretation of results should be based  on gestational age, weight and in agreement with clinical  observations.    Premature Infant recommended reference ranges:  Up to 24 hours.............<8.0 mg/dL  Up to 48 hours............<12.0 mg/dL  3-5 days..................<15.0 mg/dL  6-29 days.................<15.0 mg/dL        Albumin   Date Value Ref Range Status   05/21/2024 3.6 3.5 - 5.2 g/dL Final   05/16/2024 3.5 3.5 - 5.2 g/dL Final   05/08/2024 3.5 3.5 - 5.2 g/dL Final      Total Protein   Date Value Ref Range Status    05/21/2024 6.5 6.0 - 8.4 g/dL Final   05/16/2024 6.3 6.0 - 8.4 g/dL Final   05/08/2024 6.3 6.0 - 8.4 g/dL Final      Alkaline Phosphatase   Date Value Ref Range Status   05/21/2024 73 55 - 135 U/L Final   05/16/2024 65 55 - 135 U/L Final   05/08/2024 72 55 - 135 U/L Final        WBC   Date Value Ref Range Status   05/21/2024 2.71 (L) 3.90 - 12.70 K/uL Final   05/16/2024 2.05 (L) 3.90 - 12.70 K/uL Final   05/08/2024 1.48 (LL) 3.90 - 12.70 K/uL Final     Comment:     WBC couny and Platelet count  critical result(s) called and verbal   readback obtained from Dr. Mcclure at 17:29 on 05/08/2024 by RENARD   05/08/2024 17:51        Hemoglobin   Date Value Ref Range Status   05/21/2024 6.9 (L) 14.0 - 18.0 g/dL Final   05/16/2024 7.4 (L) 14.0 - 18.0 g/dL Final   05/08/2024 6.8 (L) 14.0 - 18.0 g/dL Final      Hematocrit   Date Value Ref Range Status   05/21/2024 21.7 (L) 40.0 - 54.0 % Final   05/16/2024 22.1 (L) 40.0 - 54.0 % Final   05/08/2024 20.2 (L) 40.0 - 54.0 % Final      Platelets   Date Value Ref Range Status   05/21/2024 19 (LL) 150 - 450 K/uL Final     Comment:     PLT critical result(s) called and verbal readback obtained from Charly Rider RN by MAB3 05/21/2024 11:14     05/16/2024 22 (LL) 150 - 450 K/uL Final     Comment:     PLT critical result(s) called and verbal readback obtained from   Elvira Amador RN. by [email or web address] 05/16/2024 14:00     05/08/2024 27 (LL) 150 - 450 K/uL Final     Comment:     WBC couny and Platelet count  critical result(s) called and verbal   readback obtained from Dr. Mcclure at 17:29 on 05/08/2024 by RENARD   05/08/2024 17:51          OSH eval  H pylori EIA neg  CMV neg  Respiratory panel neg  Legionella urine Ag neg  Strep pneumo Ag neg  Mycoplasma pneumonia Ab neg    CT abd/pelvis 4/5/2024  The esophagus is severely thickened and hyperemic suggesting infectious or inflammatory esophagitis.     Small bilateral effusions and associated left basilar consolidative opacities could     EGD  4/8/2024  LA Grade B (one or more mucosal breaks greater than 5 mm, not extending        between the tops of two mucosal folds) esophagitis with no bleeding was        found in the lower third of the esophagus. The esophagitis is about a        few cm above possible herpetic esophagitis or CMV esophagitis can not be        excluded. Biopsies were taken with a cold forceps for histology.        Verification of patient identification for the specimen was done.        Estimated blood loss was minimal. This could have been the site of the        bleeding at the current time it has not bleeding.        Diffuse moderate inflammation characterized by congestion (edema) and        erythema was found in the gastric antrum. Biopsies were taken with a        cold forceps for histology. Verification of patient identification for        the specimen was done. Estimated blood loss was minimal. No blood was        noted in the stomach        The examined duodenum was normal.     Path 4/8/2024  Final Diagnosis    A.  Stomach, biopsy-  Chronic gastritis.  Negative for H pylori ([place]).  Negative for intestinal metaplasia.  Negative for dysplasia and malignancy.     B.  Distal esophagus, biopsy-  Squamous mucosa with focal acute inflammation and atypical cells.  See comment  No columnar mucosa present for evaluation.  Negative for intestinal metaplasia.  Negative for dysplasia and malignancy.     HPV RNA sidney panel:  No high-grade or low-grade risk HPV subtypes were detected by in Situ hybridization.    Assessment:   68M with histpry of CAD, MDS on aza/nilda with plans for stem cell transplant presents for evaluation of esophagitis.     Patient's main complain is diarrhea, which has resolved with empiric treatment of vancomycin PO.    - Prior notes by Angelina Casiano and Ruperto reviewed  - Results reviewed as above      Plan:   - Plans for endoscopy with Dr. Casiano on 5/30/20  Recommend sending for pathology with CMV / HSV stains, silver,  PAS, AFB stains    - Complete 14 day course of vancomycin 125 mg PO QID  - Discussed with patient and daughter that C difficile can be recurrent - if diarrhea returns after completion of course of vancomycin, patient advised to submit stool sample - Cdiff EIA and GI pathogens panel ordered    - During transplant period, recommend additional prophylaxis with vancomycin 125 mg PO BID    - Plan discussed with Angleina Hickey and Stephenie      60 minutes of total time spent on the encounter, which includes face to face time and non-face to face time preparing to see the patient (eg, review of tests), Obtaining and/or reviewing separately obtained history, Documenting clinical information in the electronic or other health record, Independently interpreting results (not separately reported) and communicating results to the patient/family/caregiver, or Care coordination (not separately reported).     This patient's visit complexity is inherent to evaluation and management associated with medical care services that are part of ongoing care related to this patient's single, serious condition or complex condition.       Manasa Samuel MD MPH  [facility] - Infectious Disease

## 2024-05-28 NOTE — TELEPHONE ENCOUNTER
Called patient with [facility], ID# [id number]. Patient confirmed his identity via two identifiers and consent to review MBMD results with him over the phone. MBMD results were reviewed with patient. Patient was given a chance to comment and ask questions and he denied having any questions. Patient expressed appreciation for me working with him.       Giles Magana Psy.D.  Clinical Psychologist  [place] License #[id number]  [place] License #[id number]

## 2024-05-29 LAB
ELASTASE 1, FECAL: 333 MCG/G
O+P STL MICRO: NORMAL

## 2024-05-30 ENCOUNTER — LAB VISIT (OUTPATIENT)
Dept: LAB | Facility: HOSPITAL | Age: 69
End: 2024-05-30
Attending: FAMILY MEDICINE
Payer: MEDICARE

## 2024-05-30 ENCOUNTER — TELEPHONE (OUTPATIENT)
Dept: HEMATOLOGY/ONCOLOGY | Facility: CLINIC | Age: 69
End: 2024-05-30
Payer: MEDICARE

## 2024-05-30 DIAGNOSIS — D49.9 IMMUNODEFICIENCY SECONDARY TO NEOPLASM: ICD-10-CM

## 2024-05-30 DIAGNOSIS — D84.81 IMMUNODEFICIENCY SECONDARY TO NEOPLASM: ICD-10-CM

## 2024-05-30 DIAGNOSIS — D46.9 MYELODYSPLASTIC SYNDROME: ICD-10-CM

## 2024-05-30 LAB
ABO + RH BLD: NORMAL
ALBUMIN SERPL BCP-MCNC: 3.8 G/DL (ref 3.5–5.2)
ALP SERPL-CCNC: 82 U/L (ref 55–135)
ALT SERPL W/O P-5'-P-CCNC: 14 U/L (ref 10–44)
ANION GAP SERPL CALC-SCNC: 8 MMOL/L (ref 8–16)
ANISOCYTOSIS BLD QL SMEAR: ABNORMAL
AST SERPL-CCNC: 21 U/L (ref 10–40)
BASOPHILS NFR BLD: 1 % (ref 0–1.9)
BILIRUB SERPL-MCNC: 0.6 MG/DL (ref 0.1–1)
BLD GP AB SCN CELLS X3 SERPL QL: NORMAL
BUN SERPL-MCNC: 10 MG/DL (ref 8–23)
CALCIUM SERPL-MCNC: 9.2 MG/DL (ref 8.7–10.5)
CHLORIDE SERPL-SCNC: 105 MMOL/L (ref 95–110)
CO2 SERPL-SCNC: 27 MMOL/L (ref 23–29)
CREAT SERPL-MCNC: 0.9 MG/DL (ref 0.5–1.4)
DIFFERENTIAL METHOD BLD: ABNORMAL
EOSINOPHIL NFR BLD: 2 % (ref 0–8)
ERYTHROCYTE [DISTWIDTH] IN BLOOD BY AUTOMATED COUNT: 22.4 % (ref 11.5–14.5)
EST. GFR  (NO RACE VARIABLE): >60 ML/MIN/1.73 M^2
GLUCOSE SERPL-MCNC: 121 MG/DL (ref 70–110)
HCT VFR BLD AUTO: 24.9 % (ref 40–54)
HGB BLD-MCNC: 7.9 G/DL (ref 14–18)
HYPOCHROMIA BLD QL SMEAR: ABNORMAL
IMM GRANULOCYTES # BLD AUTO: ABNORMAL K/UL (ref 0–0.04)
IMM GRANULOCYTES NFR BLD AUTO: ABNORMAL % (ref 0–0.5)
LYMPHOCYTES NFR BLD: 63 % (ref 18–48)
MAGNESIUM SERPL-MCNC: 2.1 MG/DL (ref 1.6–2.6)
MCH RBC QN AUTO: 32 PG (ref 27–31)
MCHC RBC AUTO-ENTMCNC: 31.7 G/DL (ref 32–36)
MCV RBC AUTO: 101 FL (ref 82–98)
MONOCYTES NFR BLD: 1 % (ref 4–15)
NEUTROPHILS NFR BLD: 33 % (ref 38–73)
NRBC BLD-RTO: 2 /100 WBC
PHOSPHATE SERPL-MCNC: 3 MG/DL (ref 2.7–4.5)
PLATELET # BLD AUTO: 15 K/UL (ref 150–450)
PLATELET BLD QL SMEAR: ABNORMAL
PMV BLD AUTO: 10.7 FL (ref 9.2–12.9)
POLYCHROMASIA BLD QL SMEAR: ABNORMAL
POTASSIUM SERPL-SCNC: 3.7 MMOL/L (ref 3.5–5.1)
PROT SERPL-MCNC: 6.5 G/DL (ref 6–8.4)
RBC # BLD AUTO: 2.47 M/UL (ref 4.6–6.2)
SODIUM SERPL-SCNC: 140 MMOL/L (ref 136–145)
SPECIMEN OUTDATE: NORMAL
WBC # BLD AUTO: 1.89 K/UL (ref 3.9–12.7)

## 2024-05-30 PROCEDURE — 36415 COLL VENOUS BLD VENIPUNCTURE: CPT | Mod: BTX | Performed by: INTERNAL MEDICINE

## 2024-05-30 PROCEDURE — 86901 BLOOD TYPING SEROLOGIC RH(D): CPT | Performed by: INTERNAL MEDICINE

## 2024-05-30 PROCEDURE — 80053 COMPREHEN METABOLIC PANEL: CPT | Performed by: INTERNAL MEDICINE

## 2024-05-30 PROCEDURE — 83735 ASSAY OF MAGNESIUM: CPT | Mod: BTX | Performed by: INTERNAL MEDICINE

## 2024-05-30 PROCEDURE — 85007 BL SMEAR W/DIFF WBC COUNT: CPT | Performed by: INTERNAL MEDICINE

## 2024-05-30 PROCEDURE — 85027 COMPLETE CBC AUTOMATED: CPT | Mod: BTX | Performed by: INTERNAL MEDICINE

## 2024-05-30 PROCEDURE — 84100 ASSAY OF PHOSPHORUS: CPT | Performed by: INTERNAL MEDICINE

## 2024-05-31 LAB
CMV DNA SPEC QL NAA+PROBE: NORMAL
CYTOMEGALOVIRUS PCR, QUANT: NOT DETECTED IU/ML

## 2024-05-31 RX ORDER — AZACITIDINE 100 MG/1
75 INJECTION, POWDER, LYOPHILIZED, FOR SOLUTION INTRAVENOUS; SUBCUTANEOUS
Status: CANCELLED | OUTPATIENT
Start: 2024-06-05

## 2024-05-31 RX ORDER — ONDANSETRON HYDROCHLORIDE 8 MG/1
16 TABLET, FILM COATED ORAL
Status: CANCELLED | OUTPATIENT
Start: 2024-06-06

## 2024-05-31 RX ORDER — ONDANSETRON HYDROCHLORIDE 8 MG/1
16 TABLET, FILM COATED ORAL
Status: CANCELLED | OUTPATIENT
Start: 2024-06-04

## 2024-05-31 RX ORDER — ONDANSETRON HYDROCHLORIDE 8 MG/1
16 TABLET, FILM COATED ORAL
Status: CANCELLED | OUTPATIENT
Start: 2024-06-05

## 2024-05-31 RX ORDER — AZACITIDINE 100 MG/1
75 INJECTION, POWDER, LYOPHILIZED, FOR SOLUTION INTRAVENOUS; SUBCUTANEOUS
Status: CANCELLED | OUTPATIENT
Start: 2024-06-03

## 2024-05-31 RX ORDER — ONDANSETRON HYDROCHLORIDE 8 MG/1
16 TABLET, FILM COATED ORAL
Status: CANCELLED | OUTPATIENT
Start: 2024-06-07

## 2024-05-31 RX ORDER — ONDANSETRON HYDROCHLORIDE 8 MG/1
16 TABLET, FILM COATED ORAL
Status: CANCELLED | OUTPATIENT
Start: 2024-06-03

## 2024-05-31 RX ORDER — AZACITIDINE 100 MG/1
75 INJECTION, POWDER, LYOPHILIZED, FOR SOLUTION INTRAVENOUS; SUBCUTANEOUS
Status: CANCELLED | OUTPATIENT
Start: 2024-06-07

## 2024-05-31 RX ORDER — AZACITIDINE 100 MG/1
75 INJECTION, POWDER, LYOPHILIZED, FOR SOLUTION INTRAVENOUS; SUBCUTANEOUS
Status: CANCELLED | OUTPATIENT
Start: 2024-06-06

## 2024-05-31 RX ORDER — AZACITIDINE 100 MG/1
75 INJECTION, POWDER, LYOPHILIZED, FOR SOLUTION INTRAVENOUS; SUBCUTANEOUS
Status: CANCELLED | OUTPATIENT
Start: 2024-06-04

## 2024-06-02 ENCOUNTER — PATIENT MESSAGE (OUTPATIENT)
Dept: HEMATOLOGY/ONCOLOGY | Facility: CLINIC | Age: 69
End: 2024-06-02
Payer: MEDICARE

## 2024-06-03 ENCOUNTER — INFUSION (OUTPATIENT)
Dept: INFUSION THERAPY | Facility: HOSPITAL | Age: 69
End: 2024-06-03
Attending: INTERNAL MEDICINE
Payer: MEDICARE

## 2024-06-03 VITALS
SYSTOLIC BLOOD PRESSURE: 104 MMHG | TEMPERATURE: 99 F | RESPIRATION RATE: 18 BRPM | DIASTOLIC BLOOD PRESSURE: 61 MMHG | OXYGEN SATURATION: 98 % | HEIGHT: 68 IN | HEART RATE: 63 BPM | BODY MASS INDEX: 25.33 KG/M2 | WEIGHT: 167.13 LBS

## 2024-06-03 DIAGNOSIS — D46.9 MYELODYSPLASTIC SYNDROME: Primary | ICD-10-CM

## 2024-06-03 PROCEDURE — 25000003 PHARM REV CODE 250: Performed by: INTERNAL MEDICINE

## 2024-06-03 PROCEDURE — 96401 CHEMO ANTI-NEOPL SQ/IM: CPT

## 2024-06-03 PROCEDURE — 63600175 PHARM REV CODE 636 W HCPCS: Performed by: INTERNAL MEDICINE

## 2024-06-03 RX ORDER — AZACITIDINE 100 MG/1
75 INJECTION, POWDER, LYOPHILIZED, FOR SOLUTION INTRAVENOUS; SUBCUTANEOUS
Status: COMPLETED | OUTPATIENT
Start: 2024-06-03 | End: 2024-06-03

## 2024-06-03 RX ORDER — ONDANSETRON HYDROCHLORIDE 8 MG/1
16 TABLET, FILM COATED ORAL
Status: COMPLETED | OUTPATIENT
Start: 2024-06-03 | End: 2024-06-03

## 2024-06-03 RX ADMIN — AZACITIDINE 140 MG: 100 INJECTION, POWDER, LYOPHILIZED, FOR SOLUTION INTRAVENOUS; SUBCUTANEOUS at 02:06

## 2024-06-03 RX ADMIN — ONDANSETRON HYDROCHLORIDE 16 MG: 8 TABLET, FILM COATED ORAL at 01:06

## 2024-06-04 ENCOUNTER — INFUSION (OUTPATIENT)
Dept: INFUSION THERAPY | Facility: HOSPITAL | Age: 69
End: 2024-06-04
Attending: INTERNAL MEDICINE
Payer: MEDICARE

## 2024-06-04 VITALS
TEMPERATURE: 98 F | WEIGHT: 167.13 LBS | SYSTOLIC BLOOD PRESSURE: 106 MMHG | HEIGHT: 68 IN | OXYGEN SATURATION: 98 % | DIASTOLIC BLOOD PRESSURE: 69 MMHG | BODY MASS INDEX: 25.33 KG/M2 | HEART RATE: 93 BPM | RESPIRATION RATE: 18 BRPM

## 2024-06-04 DIAGNOSIS — D46.9 MYELODYSPLASTIC SYNDROME: Primary | ICD-10-CM

## 2024-06-04 PROCEDURE — 63600175 PHARM REV CODE 636 W HCPCS: Performed by: INTERNAL MEDICINE

## 2024-06-04 PROCEDURE — 25000003 PHARM REV CODE 250: Performed by: INTERNAL MEDICINE

## 2024-06-04 PROCEDURE — 96401 CHEMO ANTI-NEOPL SQ/IM: CPT

## 2024-06-04 RX ORDER — AZACITIDINE 100 MG/1
75 INJECTION, POWDER, LYOPHILIZED, FOR SOLUTION INTRAVENOUS; SUBCUTANEOUS
Status: COMPLETED | OUTPATIENT
Start: 2024-06-04 | End: 2024-06-04

## 2024-06-04 RX ORDER — ONDANSETRON HYDROCHLORIDE 8 MG/1
16 TABLET, FILM COATED ORAL
Status: COMPLETED | OUTPATIENT
Start: 2024-06-04 | End: 2024-06-04

## 2024-06-04 RX ADMIN — ONDANSETRON HYDROCHLORIDE 16 MG: 8 TABLET, FILM COATED ORAL at 01:06

## 2024-06-04 RX ADMIN — AZACITIDINE 140 MG: 100 INJECTION, POWDER, LYOPHILIZED, FOR SOLUTION INTRAVENOUS; SUBCUTANEOUS at 02:06

## 2024-06-04 NOTE — PLAN OF CARE
Patient tolerated Vidaza day 2 injection well. Patient ambulated out of clinic independently.     Problem: Anemia (Chemotherapy Effects)  Goal: Anemia Symptom Improvement  Outcome: Progressing     Problem: Urinary Bleeding Risk or Actual (Chemotherapy Effects)  Goal: Absence of Hematuria  Outcome: Progressing     Problem: Nausea and Vomiting (Chemotherapy Effects)  Goal: Fluid and Electrolyte Balance  Outcome: Progressing     Problem: Neurotoxicity (Chemotherapy Effects)  Goal: Neurotoxicity Symptom Control  Outcome: Progressing     Problem: Neutropenia (Chemotherapy Effects)  Goal: Absence of Infection  Outcome: Progressing     Problem: Oral Mucositis (Chemotherapy Effects)  Goal: Improved Oral Mucous Membrane Integrity  Outcome: Progressing     Problem: Thrombocytopenia Bleeding Risk (Chemotherapy Effects)  Goal: Absence of Bleeding  Outcome: Progressing

## 2024-06-05 ENCOUNTER — TELEPHONE (OUTPATIENT)
Dept: HEMATOLOGY/ONCOLOGY | Facility: CLINIC | Age: 69
End: 2024-06-05
Payer: MEDICARE

## 2024-06-05 ENCOUNTER — INFUSION (OUTPATIENT)
Dept: INFUSION THERAPY | Facility: HOSPITAL | Age: 69
End: 2024-06-05
Attending: INTERNAL MEDICINE
Payer: MEDICARE

## 2024-06-05 VITALS
BODY MASS INDEX: 25.33 KG/M2 | SYSTOLIC BLOOD PRESSURE: 104 MMHG | RESPIRATION RATE: 18 BRPM | TEMPERATURE: 99 F | HEART RATE: 95 BPM | WEIGHT: 167.13 LBS | OXYGEN SATURATION: 98 % | DIASTOLIC BLOOD PRESSURE: 65 MMHG | HEIGHT: 68 IN

## 2024-06-05 DIAGNOSIS — D46.9 MYELODYSPLASTIC SYNDROME: Primary | ICD-10-CM

## 2024-06-05 PROCEDURE — 96401 CHEMO ANTI-NEOPL SQ/IM: CPT

## 2024-06-05 PROCEDURE — 63600175 PHARM REV CODE 636 W HCPCS: Performed by: INTERNAL MEDICINE

## 2024-06-05 PROCEDURE — 25000003 PHARM REV CODE 250: Performed by: INTERNAL MEDICINE

## 2024-06-05 RX ORDER — ONDANSETRON HYDROCHLORIDE 8 MG/1
16 TABLET, FILM COATED ORAL
Status: COMPLETED | OUTPATIENT
Start: 2024-06-05 | End: 2024-06-05

## 2024-06-05 RX ORDER — AZACITIDINE 100 MG/1
75 INJECTION, POWDER, LYOPHILIZED, FOR SOLUTION INTRAVENOUS; SUBCUTANEOUS
Status: COMPLETED | OUTPATIENT
Start: 2024-06-05 | End: 2024-06-05

## 2024-06-05 RX ADMIN — ONDANSETRON HYDROCHLORIDE 16 MG: 8 TABLET, FILM COATED ORAL at 10:06

## 2024-06-05 RX ADMIN — AZACITIDINE 140 MG: 100 INJECTION, POWDER, LYOPHILIZED, FOR SOLUTION INTRAVENOUS; SUBCUTANEOUS at 10:06

## 2024-06-05 NOTE — NURSING
Patient tolerated Vidaza injection well, D/C with next appt scheduled.     Pt declined AVS, will use [employer].

## 2024-06-05 NOTE — TELEPHONE ENCOUNTER
LVM (using  Benjy #[id number]) in regards to f/u scheduling nutrition w/ Rony Delgadillo RD, based on malnutrition screening tool for nausea/no appetite.         [place], [place] ext [phone]

## 2024-06-05 NOTE — PROGRESS NOTES
Subjective:   Patient ID:  Guillaume Salinas is a 68 y.o. male who presents for management of No chief complaint on file.      HPI:   6/5/2024:  Initial visit with me. Former pt of Dr. Garibay as below.  PAD on medical tx.  He had MDS with significant  thrombocytopenia. Plt count is 15. He is on Pletal and hematology team wondering if it can be stopped.  He is scheduled for bone marrow transplant in July.  Reports Enzo class  II b claudication symptoms  left more than right.       Historically:    He is here by recommendation of his care team for management of Enzo IIb claudication left worse than right.  No rest pain.  No ulceration.  No previous revascularization.  He has a history of tobacco use.  He is ANGIE on the right is 0.08.  Left 0.68.  His arterial ultrasound is suggestive of left common or external iliac stenosis in addition to left SFA  with moderate disease of the right SFA/popliteal. He started rehab but also diagnosed with a malignancy. He is overall doing well.       ANGIE 4/2023    Right ANGIE 1.08  Left ANGIE 0.68 Suggest moderate PAD on the left side       US 4/2023    Monophasic waveform in the left lower extremity suggest inflow disease in the common iliac area  Occluded left mid SFA and reconstitute distal from profunda collaterals  Left profunda proximal has significantly hemodynamic stenosis more than 70%  No significant stenosis noted on the right side       Patient Active Problem List    Diagnosis Date Noted    B12 deficiency 02/02/2024    Chronic kidney disease, stage 3a 01/05/2024    Thrombocytopenia 06/29/2023    Pancytopenia 06/29/2023    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    Myelodysplastic syndrome 05/12/2023    Macrocytic anemia 04/26/2023    Atherosclerosis of native artery of both lower extremities with intermittent claudication 04/18/2023    Abnormal sensation of leg 06/13/2022    Aortic atherosclerosis 03/03/2022    Colon polyps 01/05/2022     Hypertension, essential 11/29/2021    Iliac artery stenosis, right 11/22/2021     10/29/2021 Card US      Coronary artery disease involving native coronary artery of native heart without angina pectoris 11/22/2021    Personal history of nicotine dependence 11/22/2021    Hyperlipidemia 10/20/2021                    LABS      LAST HbA1c  Lab Results   Component Value Date    HGBA1C 5.2 10/19/2021       Lipid panel  Lab Results   Component Value Date    CHOL 120 04/17/2023    CHOL 119 (L) 02/22/2022    CHOL 229 (H) 10/19/2021     Lab Results   Component Value Date    HDL 25 (L) 04/17/2023    HDL 29 (L) 02/22/2022    HDL 32 (L) 10/19/2021     Lab Results   Component Value Date    LDLCALC 71.4 04/17/2023    LDLCALC 64.4 02/22/2022    LDLCALC 157.4 10/19/2021     Lab Results   Component Value Date    TRIG 118 04/17/2023    TRIG 128 02/22/2022    TRIG 198 (H) 10/19/2021     Lab Results   Component Value Date    CHOLHDL 20.8 04/17/2023    CHOLHDL 24.4 02/22/2022    CHOLHDL 14.0 (L) 10/19/2021            Review of Systems   Constitutional: Positive for malaise/fatigue. Negative for chills and fever.   HENT:  Negative for hearing loss and nosebleeds.    Eyes:  Negative for blurred vision.   Cardiovascular:  Positive for claudication. Negative for leg swelling and palpitations.   Respiratory:  Negative for hemoptysis and shortness of breath.    Hematologic/Lymphatic: Negative for bleeding problem.   Skin:  Negative for itching.   Musculoskeletal:  Negative for falls.   Gastrointestinal:  Negative for abdominal pain and hematochezia.   Genitourinary:  Negative for hematuria.   Neurological:  Negative for dizziness and loss of balance.   Psychiatric/Behavioral:  Negative for altered mental status and depression.        Objective:   Physical Exam  Constitutional:       Appearance: He is well-developed. He is ill-appearing.   HENT:      Head: Normocephalic and atraumatic.   Eyes:      Conjunctiva/sclera: Conjunctivae normal.    Neck:      Vascular: No carotid bruit or JVD.   Cardiovascular:      Rate and Rhythm: Normal rate and regular rhythm.      Pulses: Decreased pulses.           Carotid pulses are 2+ on the right side and 2+ on the left side.       Radial pulses are 2+ on the right side and 2+ on the left side.      Heart sounds: Normal heart sounds. No murmur heard.     No friction rub. No gallop.   Pulmonary:      Effort: Pulmonary effort is normal. No respiratory distress.      Breath sounds: Normal breath sounds. No stridor. No wheezing.   Musculoskeletal:      Cervical back: Neck supple.   Skin:     General: Skin is warm and dry.   Neurological:      Mental Status: He is alert and oriented to person, place, and time.   Psychiatric:         Behavior: Behavior normal.         Assessment:     1. Iliac artery stenosis, right    2. Stem cell transplant candidate    3. Myelodysplastic syndrome    4. Peripheral vascular disease    5. Hypertension, essential    6. Mixed hyperlipidemia    7. Coronary artery disease involving native coronary artery of native heart without angina pectoris    8. Atherosclerosis of native artery of both lower extremities with intermittent claudication    9. Aortic atherosclerosis    10. Chronic kidney disease, stage 3a    11. Thrombocytopenia            Plan:    Patient with significant peripheral arterial disease  Enzo class 2 B claudications left more than right.  He is on cilostazol.  However given his MDS diagnosis and severe thrombocytopenia cilostazol  will be stopped.     After bone marrow transplant and improvement in his blood count numbers we will start cilostazol and statin back and refer him to PVD rehab.   We will consider revascularization in the future if limiting claudication despite of the above.  However at this time with ongoing MDS diagnosis,  he has not a candidate for any invasive intervention      Continue with current medical plan and lifestyle changes.  Return sooner for  concerns or questions. If symptoms persist go to the ED  I have reviewed all pertinent data on this patient       I have reviewed the patient's medical history in detail and updated the computerized patient record     Six-months  follow up.    No orders of the defined types were placed in this encounter.      Follow up as scheduled. Return sooner for concerns or questions            He expressed verbal understanding and agreed with the plan    -In today's visit, monitoring for drug toxicity was accomplished.

## 2024-06-06 ENCOUNTER — LAB VISIT (OUTPATIENT)
Dept: LAB | Facility: HOSPITAL | Age: 69
End: 2024-06-06
Payer: MEDICARE

## 2024-06-06 ENCOUNTER — TELEPHONE (OUTPATIENT)
Dept: HEMATOLOGY/ONCOLOGY | Facility: CLINIC | Age: 69
End: 2024-06-06
Payer: MEDICARE

## 2024-06-06 ENCOUNTER — INFUSION (OUTPATIENT)
Dept: INFUSION THERAPY | Facility: HOSPITAL | Age: 69
End: 2024-06-06
Attending: INTERNAL MEDICINE
Payer: MEDICARE

## 2024-06-06 ENCOUNTER — OFFICE VISIT (OUTPATIENT)
Dept: CARDIOLOGY | Facility: CLINIC | Age: 69
End: 2024-06-06
Payer: MEDICARE

## 2024-06-06 ENCOUNTER — PATIENT MESSAGE (OUTPATIENT)
Dept: HEMATOLOGY/ONCOLOGY | Facility: CLINIC | Age: 69
End: 2024-06-06
Payer: MEDICARE

## 2024-06-06 ENCOUNTER — PATIENT MESSAGE (OUTPATIENT)
Dept: GASTROENTEROLOGY | Facility: CLINIC | Age: 69
End: 2024-06-06
Payer: MEDICARE

## 2024-06-06 VITALS
DIASTOLIC BLOOD PRESSURE: 75 MMHG | TEMPERATURE: 98 F | HEART RATE: 102 BPM | SYSTOLIC BLOOD PRESSURE: 125 MMHG | HEIGHT: 68 IN | WEIGHT: 160 LBS | BODY MASS INDEX: 24.25 KG/M2 | RESPIRATION RATE: 19 BRPM | OXYGEN SATURATION: 98 %

## 2024-06-06 VITALS
HEIGHT: 68 IN | SYSTOLIC BLOOD PRESSURE: 130 MMHG | WEIGHT: 160 LBS | DIASTOLIC BLOOD PRESSURE: 70 MMHG | HEART RATE: 98 BPM | BODY MASS INDEX: 24.25 KG/M2 | OXYGEN SATURATION: 98 %

## 2024-06-06 DIAGNOSIS — E78.2 MIXED HYPERLIPIDEMIA: ICD-10-CM

## 2024-06-06 DIAGNOSIS — N18.31 CHRONIC KIDNEY DISEASE, STAGE 3A: ICD-10-CM

## 2024-06-06 DIAGNOSIS — D46.9 MYELODYSPLASTIC SYNDROME: ICD-10-CM

## 2024-06-06 DIAGNOSIS — D61.818 PANCYTOPENIA: Primary | ICD-10-CM

## 2024-06-06 DIAGNOSIS — I25.10 CORONARY ARTERY DISEASE INVOLVING NATIVE CORONARY ARTERY OF NATIVE HEART WITHOUT ANGINA PECTORIS: ICD-10-CM

## 2024-06-06 DIAGNOSIS — D69.6 THROMBOCYTOPENIA: ICD-10-CM

## 2024-06-06 DIAGNOSIS — D61.818 PANCYTOPENIA: ICD-10-CM

## 2024-06-06 DIAGNOSIS — I70.0 AORTIC ATHEROSCLEROSIS: ICD-10-CM

## 2024-06-06 DIAGNOSIS — I70.213 ATHEROSCLEROSIS OF NATIVE ARTERY OF BOTH LOWER EXTREMITIES WITH INTERMITTENT CLAUDICATION: ICD-10-CM

## 2024-06-06 DIAGNOSIS — Z76.82 STEM CELL TRANSPLANT CANDIDATE: ICD-10-CM

## 2024-06-06 DIAGNOSIS — I73.9 PERIPHERAL VASCULAR DISEASE: ICD-10-CM

## 2024-06-06 DIAGNOSIS — I10 HYPERTENSION, ESSENTIAL: ICD-10-CM

## 2024-06-06 DIAGNOSIS — I77.1 ILIAC ARTERY STENOSIS, RIGHT: Primary | ICD-10-CM

## 2024-06-06 DIAGNOSIS — D46.9 MYELODYSPLASTIC SYNDROME: Primary | ICD-10-CM

## 2024-06-06 PROBLEM — R07.9 CHEST PAIN: Status: RESOLVED | Noted: 2021-11-22 | Resolved: 2024-06-06

## 2024-06-06 LAB
ABO + RH BLD: NORMAL
ALBUMIN SERPL BCP-MCNC: 3.8 G/DL (ref 3.5–5.2)
ALP SERPL-CCNC: 92 U/L (ref 55–135)
ALT SERPL W/O P-5'-P-CCNC: 21 U/L (ref 10–44)
ANION GAP SERPL CALC-SCNC: 11 MMOL/L (ref 8–16)
ANISOCYTOSIS BLD QL SMEAR: SLIGHT
AST SERPL-CCNC: 22 U/L (ref 10–40)
BASOPHILS NFR BLD: 0 % (ref 0–1.9)
BILIRUB SERPL-MCNC: 0.7 MG/DL (ref 0.1–1)
BLD GP AB SCN CELLS X3 SERPL QL: NORMAL
BUN SERPL-MCNC: 11 MG/DL (ref 8–23)
CALCIUM SERPL-MCNC: 9.2 MG/DL (ref 8.7–10.5)
CHLORIDE SERPL-SCNC: 104 MMOL/L (ref 95–110)
CO2 SERPL-SCNC: 22 MMOL/L (ref 23–29)
CREAT SERPL-MCNC: 1 MG/DL (ref 0.5–1.4)
DACRYOCYTES BLD QL SMEAR: ABNORMAL
DIFFERENTIAL METHOD BLD: ABNORMAL
EOSINOPHIL NFR BLD: 6 % (ref 0–8)
ERYTHROCYTE [DISTWIDTH] IN BLOOD BY AUTOMATED COUNT: 22.5 % (ref 11.5–14.5)
EST. GFR  (NO RACE VARIABLE): >60 ML/MIN/1.73 M^2
GLUCOSE SERPL-MCNC: 139 MG/DL (ref 70–110)
HCT VFR BLD AUTO: 22 % (ref 40–54)
HGB BLD-MCNC: 7.1 G/DL (ref 14–18)
HYPOCHROMIA BLD QL SMEAR: ABNORMAL
IMM GRANULOCYTES # BLD AUTO: ABNORMAL K/UL (ref 0–0.04)
IMM GRANULOCYTES NFR BLD AUTO: ABNORMAL % (ref 0–0.5)
LYMPHOCYTES NFR BLD: 53 % (ref 18–48)
MAGNESIUM SERPL-MCNC: 2.2 MG/DL (ref 1.6–2.6)
MCH RBC QN AUTO: 32.6 PG (ref 27–31)
MCHC RBC AUTO-ENTMCNC: 32.3 G/DL (ref 32–36)
MCV RBC AUTO: 101 FL (ref 82–98)
MONOCYTES NFR BLD: 1 % (ref 4–15)
NEUTROPHILS NFR BLD: 39 % (ref 38–73)
NEUTS BAND NFR BLD MANUAL: 1 %
NRBC BLD-RTO: 2 /100 WBC
PHOSPHATE SERPL-MCNC: 3.2 MG/DL (ref 2.7–4.5)
PLATELET # BLD AUTO: 11 K/UL (ref 150–450)
PLATELET BLD QL SMEAR: ABNORMAL
PMV BLD AUTO: 11.1 FL (ref 9.2–12.9)
POIKILOCYTOSIS BLD QL SMEAR: SLIGHT
POLYCHROMASIA BLD QL SMEAR: ABNORMAL
POTASSIUM SERPL-SCNC: 3.9 MMOL/L (ref 3.5–5.1)
PROT SERPL-MCNC: 6.6 G/DL (ref 6–8.4)
RBC # BLD AUTO: 2.18 M/UL (ref 4.6–6.2)
SODIUM SERPL-SCNC: 137 MMOL/L (ref 136–145)
SPECIMEN OUTDATE: NORMAL
WBC # BLD AUTO: 1.49 K/UL (ref 3.9–12.7)

## 2024-06-06 PROCEDURE — 85027 COMPLETE CBC AUTOMATED: CPT | Performed by: INTERNAL MEDICINE

## 2024-06-06 PROCEDURE — 80053 COMPREHEN METABOLIC PANEL: CPT | Performed by: INTERNAL MEDICINE

## 2024-06-06 PROCEDURE — 86920 COMPATIBILITY TEST SPIN: CPT | Performed by: INTERNAL MEDICINE

## 2024-06-06 PROCEDURE — 96401 CHEMO ANTI-NEOPL SQ/IM: CPT

## 2024-06-06 PROCEDURE — 25000003 PHARM REV CODE 250: Performed by: INTERNAL MEDICINE

## 2024-06-06 PROCEDURE — 99999 PR PBB SHADOW E&M-EST. PATIENT-LVL IV: CPT | Mod: PBBFAC,,, | Performed by: INTERNAL MEDICINE

## 2024-06-06 PROCEDURE — P9040 RBC LEUKOREDUCED IRRADIATED: HCPCS | Performed by: INTERNAL MEDICINE

## 2024-06-06 PROCEDURE — 86850 RBC ANTIBODY SCREEN: CPT | Performed by: INTERNAL MEDICINE

## 2024-06-06 PROCEDURE — P9037 PLATE PHERES LEUKOREDU IRRAD: HCPCS | Performed by: INTERNAL MEDICINE

## 2024-06-06 PROCEDURE — 86644 CMV ANTIBODY: CPT | Mod: BTX | Performed by: INTERNAL MEDICINE

## 2024-06-06 PROCEDURE — 63600175 PHARM REV CODE 636 W HCPCS: Performed by: INTERNAL MEDICINE

## 2024-06-06 PROCEDURE — 84100 ASSAY OF PHOSPHORUS: CPT | Mod: BTX | Performed by: INTERNAL MEDICINE

## 2024-06-06 PROCEDURE — 83735 ASSAY OF MAGNESIUM: CPT | Mod: BTX | Performed by: INTERNAL MEDICINE

## 2024-06-06 PROCEDURE — 85007 BL SMEAR W/DIFF WBC COUNT: CPT | Performed by: INTERNAL MEDICINE

## 2024-06-06 RX ORDER — AZACITIDINE 100 MG/1
75 INJECTION, POWDER, LYOPHILIZED, FOR SOLUTION INTRAVENOUS; SUBCUTANEOUS
Status: COMPLETED | OUTPATIENT
Start: 2024-06-06 | End: 2024-06-06

## 2024-06-06 RX ORDER — HYDROCODONE BITARTRATE AND ACETAMINOPHEN 500; 5 MG/1; MG/1
TABLET ORAL ONCE
Status: CANCELLED | OUTPATIENT
Start: 2024-06-06 | End: 2024-06-06

## 2024-06-06 RX ORDER — ACETAMINOPHEN 325 MG/1
650 TABLET ORAL
Status: CANCELLED | OUTPATIENT
Start: 2024-06-06

## 2024-06-06 RX ORDER — ONDANSETRON HYDROCHLORIDE 8 MG/1
16 TABLET, FILM COATED ORAL
Status: COMPLETED | OUTPATIENT
Start: 2024-06-06 | End: 2024-06-06

## 2024-06-06 RX ADMIN — AZACITIDINE 140 MG: 100 INJECTION, POWDER, LYOPHILIZED, FOR SOLUTION INTRAVENOUS; SUBCUTANEOUS at 02:06

## 2024-06-06 RX ADMIN — ONDANSETRON HYDROCHLORIDE 16 MG: 8 TABLET, FILM COATED ORAL at 01:06

## 2024-06-07 ENCOUNTER — INFUSION (OUTPATIENT)
Dept: INFUSION THERAPY | Facility: HOSPITAL | Age: 69
End: 2024-06-07
Attending: INTERNAL MEDICINE
Payer: MEDICARE

## 2024-06-07 VITALS
OXYGEN SATURATION: 99 % | RESPIRATION RATE: 18 BRPM | TEMPERATURE: 98 F | SYSTOLIC BLOOD PRESSURE: 115 MMHG | DIASTOLIC BLOOD PRESSURE: 60 MMHG | HEART RATE: 78 BPM | HEIGHT: 68 IN | WEIGHT: 159.81 LBS | BODY MASS INDEX: 24.22 KG/M2

## 2024-06-07 DIAGNOSIS — D46.9 MYELODYSPLASTIC SYNDROME: ICD-10-CM

## 2024-06-07 DIAGNOSIS — D84.822 IMMUNOCOMPROMISED STATE ASSOCIATED WITH STEM CELL TRANSPLANT: Primary | ICD-10-CM

## 2024-06-07 DIAGNOSIS — Z94.84 IMMUNOCOMPROMISED STATE ASSOCIATED WITH STEM CELL TRANSPLANT: Primary | ICD-10-CM

## 2024-06-07 DIAGNOSIS — Z76.82 STEM CELL TRANSPLANT CANDIDATE: Primary | ICD-10-CM

## 2024-06-07 DIAGNOSIS — D46.9 MYELODYSPLASTIC SYNDROME: Primary | ICD-10-CM

## 2024-06-07 DIAGNOSIS — D64.9 SYMPTOMATIC ANEMIA: ICD-10-CM

## 2024-06-07 DIAGNOSIS — D61.818 PANCYTOPENIA: ICD-10-CM

## 2024-06-07 LAB
BLD PROD TYP BPU: NORMAL
BLD PROD TYP BPU: NORMAL
BLOOD UNIT EXPIRATION DATE: NORMAL
BLOOD UNIT EXPIRATION DATE: NORMAL
BLOOD UNIT TYPE CODE: 5100
BLOOD UNIT TYPE CODE: 6200
BLOOD UNIT TYPE: NORMAL
BLOOD UNIT TYPE: NORMAL
CODING SYSTEM: NORMAL
CODING SYSTEM: NORMAL
CROSSMATCH INTERPRETATION: NORMAL
CROSSMATCH INTERPRETATION: NORMAL
DISPENSE STATUS: NORMAL
DISPENSE STATUS: NORMAL
NUM UNITS TRANS PACKED RBC: NORMAL
UNIT NUMBER: NORMAL

## 2024-06-07 PROCEDURE — 96401 CHEMO ANTI-NEOPL SQ/IM: CPT

## 2024-06-07 PROCEDURE — 25000003 PHARM REV CODE 250: Performed by: INTERNAL MEDICINE

## 2024-06-07 PROCEDURE — 36430 TRANSFUSION BLD/BLD COMPNT: CPT

## 2024-06-07 PROCEDURE — 63600175 PHARM REV CODE 636 W HCPCS: Performed by: INTERNAL MEDICINE

## 2024-06-07 RX ORDER — ONDANSETRON HYDROCHLORIDE 8 MG/1
16 TABLET, FILM COATED ORAL
Status: COMPLETED | OUTPATIENT
Start: 2024-06-07 | End: 2024-06-07

## 2024-06-07 RX ORDER — ACETAMINOPHEN 325 MG/1
650 TABLET ORAL
Status: DISCONTINUED | OUTPATIENT
Start: 2024-06-07 | End: 2024-06-07 | Stop reason: HOSPADM

## 2024-06-07 RX ORDER — AZACITIDINE 100 MG/1
75 INJECTION, POWDER, LYOPHILIZED, FOR SOLUTION INTRAVENOUS; SUBCUTANEOUS
Status: COMPLETED | OUTPATIENT
Start: 2024-06-07 | End: 2024-06-07

## 2024-06-07 RX ORDER — HYDROCODONE BITARTRATE AND ACETAMINOPHEN 500; 5 MG/1; MG/1
TABLET ORAL ONCE
Status: COMPLETED | OUTPATIENT
Start: 2024-06-07 | End: 2024-06-07

## 2024-06-07 RX ORDER — HYDROCODONE BITARTRATE AND ACETAMINOPHEN 500; 5 MG/1; MG/1
TABLET ORAL ONCE
Status: DISCONTINUED | OUTPATIENT
Start: 2024-06-07 | End: 2024-06-07 | Stop reason: HOSPADM

## 2024-06-07 RX ORDER — POSACONAZOLE 100 MG/1
300 TABLET, DELAYED RELEASE ORAL DAILY
Qty: 90 TABLET | Refills: 11 | Status: SHIPPED | OUTPATIENT
Start: 2024-07-24

## 2024-06-07 RX ADMIN — ONDANSETRON HYDROCHLORIDE 16 MG: 8 TABLET, FILM COATED ORAL at 11:06

## 2024-06-07 RX ADMIN — SODIUM CHLORIDE: 9 INJECTION, SOLUTION INTRAVENOUS at 09:06

## 2024-06-07 RX ADMIN — AZACITIDINE 140 MG: 100 INJECTION, POWDER, LYOPHILIZED, FOR SOLUTION INTRAVENOUS; SUBCUTANEOUS at 11:06

## 2024-06-07 NOTE — PLAN OF CARE
Pt tolerated PRBC, PLT and vidaza injectiontoday. PIV flushed, + blood return, saline locked, and removed prior to discharge. VSS. AVS provided. Discharged off unit without complaints.

## 2024-06-13 ENCOUNTER — LAB VISIT (OUTPATIENT)
Dept: LAB | Facility: HOSPITAL | Age: 69
End: 2024-06-13
Attending: INTERNAL MEDICINE
Payer: MEDICARE

## 2024-06-13 ENCOUNTER — TELEPHONE (OUTPATIENT)
Dept: HEMATOLOGY/ONCOLOGY | Facility: CLINIC | Age: 69
End: 2024-06-13
Payer: MEDICARE

## 2024-06-13 DIAGNOSIS — D46.9 MYELODYSPLASTIC SYNDROME: ICD-10-CM

## 2024-06-13 DIAGNOSIS — D69.6 THROMBOCYTOPENIA: ICD-10-CM

## 2024-06-13 DIAGNOSIS — Z76.82 STEM CELL TRANSPLANT CANDIDATE: ICD-10-CM

## 2024-06-13 DIAGNOSIS — D69.6 THROMBOCYTOPENIA: Primary | ICD-10-CM

## 2024-06-13 LAB
ABO + RH BLD: NORMAL
ALBUMIN SERPL BCP-MCNC: 3.9 G/DL (ref 3.5–5.2)
ALP SERPL-CCNC: 113 U/L (ref 55–135)
ALT SERPL W/O P-5'-P-CCNC: 25 U/L (ref 10–44)
ANION GAP SERPL CALC-SCNC: 11 MMOL/L (ref 8–16)
ANISOCYTOSIS BLD QL SMEAR: SLIGHT
AST SERPL-CCNC: 25 U/L (ref 10–40)
BASOPHILS # BLD AUTO: ABNORMAL K/UL (ref 0–0.2)
BASOPHILS NFR BLD: 2 % (ref 0–1.9)
BILIRUB SERPL-MCNC: 0.6 MG/DL (ref 0.1–1)
BLD GP AB SCN CELLS X3 SERPL QL: NORMAL
BUN SERPL-MCNC: 15 MG/DL (ref 8–23)
CALCIUM SERPL-MCNC: 9.7 MG/DL (ref 8.7–10.5)
CHLORIDE SERPL-SCNC: 107 MMOL/L (ref 95–110)
CO2 SERPL-SCNC: 24 MMOL/L (ref 23–29)
CREAT SERPL-MCNC: 0.9 MG/DL (ref 0.5–1.4)
DIFFERENTIAL METHOD BLD: ABNORMAL
EOSINOPHIL # BLD AUTO: ABNORMAL K/UL (ref 0–0.5)
EOSINOPHIL NFR BLD: 5 % (ref 0–8)
ERYTHROCYTE [DISTWIDTH] IN BLOOD BY AUTOMATED COUNT: 20.3 % (ref 11.5–14.5)
EST. GFR  (NO RACE VARIABLE): >60 ML/MIN/1.73 M^2
GLUCOSE SERPL-MCNC: 127 MG/DL (ref 70–110)
HBV CORE AB SERPL QL IA: NORMAL
HBV SURFACE AG SERPL QL IA: NORMAL
HCT VFR BLD AUTO: 24.3 % (ref 40–54)
HCV AB SERPL QL IA: NORMAL
HGB BLD-MCNC: 7.9 G/DL (ref 14–18)
HIV 1+2 AB+HIV1 P24 AG SERPL QL IA: NORMAL
HYPOCHROMIA BLD QL SMEAR: ABNORMAL
IMM GRANULOCYTES # BLD AUTO: ABNORMAL K/UL (ref 0–0.04)
IMM GRANULOCYTES NFR BLD AUTO: ABNORMAL % (ref 0–0.5)
LYMPHOCYTES # BLD AUTO: ABNORMAL K/UL (ref 1–4.8)
LYMPHOCYTES NFR BLD: 57 % (ref 18–48)
MAGNESIUM SERPL-MCNC: 2.1 MG/DL (ref 1.6–2.6)
MCH RBC QN AUTO: 31.9 PG (ref 27–31)
MCHC RBC AUTO-ENTMCNC: 32.5 G/DL (ref 32–36)
MCV RBC AUTO: 98 FL (ref 82–98)
MONOCYTES # BLD AUTO: ABNORMAL K/UL (ref 0.3–1)
MONOCYTES NFR BLD: 4 % (ref 4–15)
NEUTROPHILS # BLD AUTO: ABNORMAL K/UL (ref 1.8–7.7)
NEUTROPHILS NFR BLD: 32 % (ref 38–73)
NRBC BLD-RTO: 0 /100 WBC
PHOSPHATE SERPL-MCNC: 4.1 MG/DL (ref 2.7–4.5)
PLATELET # BLD AUTO: 8 K/UL (ref 150–450)
PLATELET BLD QL SMEAR: ABNORMAL
PMV BLD AUTO: 11 FL (ref 9.2–12.9)
POIKILOCYTOSIS BLD QL SMEAR: SLIGHT
POLYCHROMASIA BLD QL SMEAR: ABNORMAL
POTASSIUM SERPL-SCNC: 4.5 MMOL/L (ref 3.5–5.1)
PROT SERPL-MCNC: 6.8 G/DL (ref 6–8.4)
RBC # BLD AUTO: 2.48 M/UL (ref 4.6–6.2)
SODIUM SERPL-SCNC: 142 MMOL/L (ref 136–145)
SPECIMEN OUTDATE: NORMAL
WBC # BLD AUTO: 1.57 K/UL (ref 3.9–12.7)

## 2024-06-13 PROCEDURE — 86695 HERPES SIMPLEX TYPE 1 TEST: CPT | Performed by: INTERNAL MEDICINE

## 2024-06-13 PROCEDURE — 86644 CMV ANTIBODY: CPT | Mod: BTX | Performed by: INTERNAL MEDICINE

## 2024-06-13 PROCEDURE — 86355 B CELLS TOTAL COUNT: CPT | Performed by: INTERNAL MEDICINE

## 2024-06-13 PROCEDURE — 86360 T CELL ABSOLUTE COUNT/RATIO: CPT | Performed by: INTERNAL MEDICINE

## 2024-06-13 PROCEDURE — 86900 BLOOD TYPING SEROLOGIC ABO: CPT | Performed by: INTERNAL MEDICINE

## 2024-06-13 PROCEDURE — 83735 ASSAY OF MAGNESIUM: CPT | Mod: BTX | Performed by: INTERNAL MEDICINE

## 2024-06-13 PROCEDURE — 86787 VARICELLA-ZOSTER ANTIBODY: CPT | Mod: BTX | Performed by: INTERNAL MEDICINE

## 2024-06-13 PROCEDURE — 84100 ASSAY OF PHOSPHORUS: CPT | Performed by: INTERNAL MEDICINE

## 2024-06-13 PROCEDURE — 86357 NK CELLS TOTAL COUNT: CPT | Mod: BTX | Performed by: INTERNAL MEDICINE

## 2024-06-13 PROCEDURE — 86850 RBC ANTIBODY SCREEN: CPT | Mod: BTX | Performed by: INTERNAL MEDICINE

## 2024-06-13 PROCEDURE — 85027 COMPLETE CBC AUTOMATED: CPT | Mod: BTX | Performed by: INTERNAL MEDICINE

## 2024-06-13 PROCEDURE — 87799 DETECT AGENT NOS DNA QUANT: CPT | Mod: BTX | Performed by: INTERNAL MEDICINE

## 2024-06-13 PROCEDURE — 86803 HEPATITIS C AB TEST: CPT | Performed by: INTERNAL MEDICINE

## 2024-06-13 PROCEDURE — 80053 COMPREHEN METABOLIC PANEL: CPT | Mod: BTX | Performed by: INTERNAL MEDICINE

## 2024-06-13 PROCEDURE — 86704 HEP B CORE ANTIBODY TOTAL: CPT | Performed by: INTERNAL MEDICINE

## 2024-06-13 PROCEDURE — 86359 T CELLS TOTAL COUNT: CPT | Performed by: INTERNAL MEDICINE

## 2024-06-13 PROCEDURE — 86696 HERPES SIMPLEX TYPE 2 TEST: CPT | Performed by: INTERNAL MEDICINE

## 2024-06-13 PROCEDURE — P9037 PLATE PHERES LEUKOREDU IRRAD: HCPCS | Performed by: INTERNAL MEDICINE

## 2024-06-13 PROCEDURE — 87389 HIV-1 AG W/HIV-1&-2 AB AG IA: CPT | Mod: BTX | Performed by: INTERNAL MEDICINE

## 2024-06-13 PROCEDURE — 86592 SYPHILIS TEST NON-TREP QUAL: CPT | Mod: BTX | Performed by: INTERNAL MEDICINE

## 2024-06-13 PROCEDURE — 85007 BL SMEAR W/DIFF WBC COUNT: CPT | Performed by: INTERNAL MEDICINE

## 2024-06-13 PROCEDURE — 87340 HEPATITIS B SURFACE AG IA: CPT | Performed by: INTERNAL MEDICINE

## 2024-06-13 PROCEDURE — 36415 COLL VENOUS BLD VENIPUNCTURE: CPT | Performed by: INTERNAL MEDICINE

## 2024-06-13 RX ORDER — ACETAMINOPHEN 325 MG/1
650 TABLET ORAL
Status: CANCELLED | OUTPATIENT
Start: 2024-06-13

## 2024-06-13 RX ORDER — HYDROCODONE BITARTRATE AND ACETAMINOPHEN 500; 5 MG/1; MG/1
TABLET ORAL ONCE
Status: CANCELLED | OUTPATIENT
Start: 2024-06-13 | End: 2024-06-13

## 2024-06-13 NOTE — TELEPHONE ENCOUNTER
Spoke to pts daughter via phone and informed her the pts plts are 8 and Dr. Lares ordered 1 unit of plts for him to be transfused tomorrow at 8 am at [facility]. Pt verbalized understanding of instructions.

## 2024-06-14 ENCOUNTER — INFUSION (OUTPATIENT)
Dept: INFUSION THERAPY | Facility: HOSPITAL | Age: 69
End: 2024-06-14
Attending: INTERNAL MEDICINE
Payer: MEDICARE

## 2024-06-14 ENCOUNTER — PATIENT MESSAGE (OUTPATIENT)
Dept: HEMATOLOGY/ONCOLOGY | Facility: CLINIC | Age: 69
End: 2024-06-14
Payer: MEDICARE

## 2024-06-14 VITALS
TEMPERATURE: 98 F | RESPIRATION RATE: 18 BRPM | HEART RATE: 76 BPM | BODY MASS INDEX: 24.22 KG/M2 | DIASTOLIC BLOOD PRESSURE: 58 MMHG | WEIGHT: 159.81 LBS | HEIGHT: 68 IN | SYSTOLIC BLOOD PRESSURE: 110 MMHG | OXYGEN SATURATION: 100 %

## 2024-06-14 DIAGNOSIS — D46.9 MYELODYSPLASTIC SYNDROME: ICD-10-CM

## 2024-06-14 DIAGNOSIS — D69.6 THROMBOCYTOPENIA: ICD-10-CM

## 2024-06-14 LAB
BLD PROD TYP BPU: NORMAL
BLOOD UNIT EXPIRATION DATE: NORMAL
BLOOD UNIT TYPE CODE: 5100
BLOOD UNIT TYPE: NORMAL
CD3+CD4+ CELLS # BLD: 490 CELLS/UL (ref 300–1400)
CD3+CD4+ CELLS NFR BLD: 48.6 % (ref 28–57)
CMV IGG SERPL QL IA: REACTIVE
CODING SYSTEM: NORMAL
CROSSMATCH INTERPRETATION: NORMAL
DISPENSE STATUS: NORMAL
EPSTEIN-BARR VIRUS DNA: NORMAL
EPSTEIN-BARR VIRUS PCR, QUANT: NOT DETECTED IU/ML
HSV1 IGG SERPL QL IA: POSITIVE
HSV2 IGG SERPL QL IA: POSITIVE
LYMPHOCYTES NFR CSF MANUAL: 0 % (ref 6–19)
LYMPHOCYTES NFR CSF MANUAL: 0 CELLS/UL (ref 100–500)
LYMPHOCYTES NFR CSF MANUAL: 0.94 % (ref 0.9–3.6)
LYMPHOCYTES NFR CSF MANUAL: 3 % (ref 7–31)
LYMPHOCYTES NFR CSF MANUAL: 31 CELLS/UL (ref 90–600)
LYMPHOCYTES NFR CSF MANUAL: 51.8 % (ref 10–39)
LYMPHOCYTES NFR CSF MANUAL: 522 CELLS/UL (ref 200–900)
LYMPHOCYTES NFR CSF MANUAL: 97 % (ref 55–83)
LYMPHOCYTES NFR CSF MANUAL: 978 CELLS/UL (ref 700–2100)
RPR SER QL: NORMAL
UNIT NUMBER: NORMAL
VARICELLA INTERPRETATION: POSITIVE
VARICELLA ZOSTER IGG: 2968

## 2024-06-14 PROCEDURE — 36430 TRANSFUSION BLD/BLD COMPNT: CPT

## 2024-06-14 PROCEDURE — 25000003 PHARM REV CODE 250: Performed by: INTERNAL MEDICINE

## 2024-06-14 PROCEDURE — P9037 PLATE PHERES LEUKOREDU IRRAD: HCPCS | Performed by: INTERNAL MEDICINE

## 2024-06-14 RX ORDER — HYDROCODONE BITARTRATE AND ACETAMINOPHEN 500; 5 MG/1; MG/1
TABLET ORAL ONCE
Status: COMPLETED | OUTPATIENT
Start: 2024-06-14 | End: 2024-06-14

## 2024-06-14 RX ORDER — ACETAMINOPHEN 325 MG/1
650 TABLET ORAL
Status: DISCONTINUED | OUTPATIENT
Start: 2024-06-14 | End: 2024-06-14 | Stop reason: HOSPADM

## 2024-06-14 RX ADMIN — SODIUM CHLORIDE: 9 INJECTION, SOLUTION INTRAVENOUS at 08:06

## 2024-06-14 NOTE — PLAN OF CARE
Pt tolerated PLTS today. PIV flushed, + blood return, saline locked, and removed prior to discharge. VSS. AVS declined. Discharged off unit without complaints.

## 2024-06-18 ENCOUNTER — PROCEDURE VISIT (OUTPATIENT)
Dept: HEMATOLOGY/ONCOLOGY | Facility: CLINIC | Age: 69
End: 2024-06-18
Payer: MEDICARE

## 2024-06-18 ENCOUNTER — DOCUMENTATION ONLY (OUTPATIENT)
Dept: HEMATOLOGY/ONCOLOGY | Facility: CLINIC | Age: 69
End: 2024-06-18
Payer: MEDICARE

## 2024-06-18 VITALS
SYSTOLIC BLOOD PRESSURE: 117 MMHG | OXYGEN SATURATION: 99 % | TEMPERATURE: 98 F | RESPIRATION RATE: 18 BRPM | DIASTOLIC BLOOD PRESSURE: 69 MMHG | HEART RATE: 75 BPM

## 2024-06-18 DIAGNOSIS — D46.9 MYELODYSPLASTIC SYNDROME: Primary | ICD-10-CM

## 2024-06-18 LAB
REASON FOR REFERRAL (NARRATIVE): NORMAL
SPECIMEN SOURCE: NORMAL

## 2024-06-18 PROCEDURE — 88341 IMHCHEM/IMCYTCHM EA ADD ANTB: CPT | Mod: 59 | Performed by: PATHOLOGY

## 2024-06-18 PROCEDURE — 81450 HL NEO GSAP 5-50DNA/DNA&RNA: CPT | Performed by: NURSE PRACTITIONER

## 2024-06-18 PROCEDURE — 88237 TISSUE CULTURE BONE MARROW: CPT | Performed by: NURSE PRACTITIONER

## 2024-06-18 PROCEDURE — 88342 IMHCHEM/IMCYTCHM 1ST ANTB: CPT | Performed by: PATHOLOGY

## 2024-06-18 PROCEDURE — 88305 TISSUE EXAM BY PATHOLOGIST: CPT | Performed by: PATHOLOGY

## 2024-06-18 PROCEDURE — 88275 CYTOGENETICS 100-300: CPT | Mod: 59 | Performed by: NURSE PRACTITIONER

## 2024-06-18 PROCEDURE — 88185 FLOWCYTOMETRY/TC ADD-ON: CPT | Performed by: PATHOLOGY

## 2024-06-18 PROCEDURE — 88311 DECALCIFY TISSUE: CPT | Performed by: PATHOLOGY

## 2024-06-18 PROCEDURE — 88184 FLOWCYTOMETRY/ TC 1 MARKER: CPT | Performed by: PATHOLOGY

## 2024-06-18 PROCEDURE — 88299 UNLISTED CYTOGENETIC STUDY: CPT | Performed by: NURSE PRACTITIONER

## 2024-06-18 PROCEDURE — 88271 CYTOGENETICS DNA PROBE: CPT | Performed by: NURSE PRACTITIONER

## 2024-06-18 PROCEDURE — 88313 SPECIAL STAINS GROUP 2: CPT | Mod: 59 | Performed by: PATHOLOGY

## 2024-06-18 RX ORDER — LIDOCAINE HYDROCHLORIDE 20 MG/ML
8 INJECTION, SOLUTION INFILTRATION; PERINEURAL
Status: COMPLETED | OUTPATIENT
Start: 2024-06-18 | End: 2024-06-18

## 2024-06-18 RX ADMIN — LIDOCAINE HYDROCHLORIDE 8 ML: 20 INJECTION, SOLUTION INFILTRATION; PERINEURAL at 02:06

## 2024-06-18 NOTE — PROCEDURES
Procedures    PROCEDURE NOTE:  Date of Procedure: 06/18/2024  Bone Marrow Biopsy and Aspiration  Indication: MDS  Consent: Informed consent was obtained from patient.  Timeout: Done and documented. Allergies reviewed.  Position: prone  Site: Left posterior illiac crest.  Prep: Betadine.  Needle used: 11 gauge Jamshidi needle.  Anesthetic: 2% lidocaine 8 cc.  Biopsy: The biopsy needle was introduced into the marrow cavity and an aspirate was obtained without complications and sent for flow cytometry, AML fish, NGS, DNA/RNA hold, and cytogenetics. Core biopsy obtained without difficulty and sent for routine histologic examination.  Complications: None.  Disposition: The patient was placed supine for 15min following procedure. MA to assess bandaid for bleeding prior to discharge home. Patient aware to keep bandaid dry and intact for 24hrs and to call clinic for any bleeding, fevers, pain, or signs of infection.  Blood loss: Minimal.     Judy Anthony NP  Hematology/Oncology/BMT

## 2024-06-18 NOTE — PROGRESS NOTES
The  submitted an application for the patient to [employer] fot the medication Prevymis. A response from the providing  will be completed in two days.

## 2024-06-20 LAB
AML FISH REASON FOR REFERRAL (BM): NORMAL
ANNOTATION COMMENT IMP: NORMAL
CELLS W CYTOGENETIC ABNL BLD/T: NORMAL
CHROM ANALY RESULT (ISCN): NORMAL
CLINICAL CYTOGENETICIST REVIEW: NORMAL
COMMENT: NORMAL
DNA/RNA EXTRACT AND HOLD RESULT: NORMAL
DNA/RNA EXTRACTION: NORMAL
EXHR SPECIMEN TYPE: NORMAL
FINAL PATHOLOGIC DIAGNOSIS: NORMAL
GROSS: NORMAL
LAB TEST METHOD: NORMAL
Lab: NORMAL
MICROSCOPIC EXAM: NORMAL
MOL DX INTERP BLD/T QL: NORMAL
PROVIDER SIGNING NAME: NORMAL
SPECIMEN SOURCE: NORMAL
TEST PERFORMANCE INFO SPEC: NORMAL

## 2024-06-21 ENCOUNTER — TELEPHONE (OUTPATIENT)
Dept: HEMATOLOGY/ONCOLOGY | Facility: CLINIC | Age: 69
End: 2024-06-21

## 2024-06-21 ENCOUNTER — OFFICE VISIT (OUTPATIENT)
Dept: HEMATOLOGY/ONCOLOGY | Facility: CLINIC | Age: 69
End: 2024-06-21
Payer: MEDICARE

## 2024-06-21 ENCOUNTER — HOSPITAL ENCOUNTER (EMERGENCY)
Facility: HOSPITAL | Age: 69
Discharge: HOME | End: 2024-06-21
Attending: EMERGENCY MEDICINE
Payer: MEDICARE

## 2024-06-21 VITALS
WEIGHT: 160.19 LBS | SYSTOLIC BLOOD PRESSURE: 127 MMHG | HEART RATE: 82 BPM | TEMPERATURE: 98 F | BODY MASS INDEX: 24.28 KG/M2 | HEIGHT: 68 IN | RESPIRATION RATE: 18 BRPM | OXYGEN SATURATION: 99 % | DIASTOLIC BLOOD PRESSURE: 60 MMHG

## 2024-06-21 VITALS
SYSTOLIC BLOOD PRESSURE: 114 MMHG | RESPIRATION RATE: 17 BRPM | DIASTOLIC BLOOD PRESSURE: 59 MMHG | HEIGHT: 68 IN | TEMPERATURE: 98 F | BODY MASS INDEX: 24.25 KG/M2 | OXYGEN SATURATION: 100 % | HEART RATE: 72 BPM | WEIGHT: 160 LBS

## 2024-06-21 DIAGNOSIS — D49.9 IMMUNODEFICIENCY SECONDARY TO NEOPLASM: ICD-10-CM

## 2024-06-21 DIAGNOSIS — D46.9 MYELODYSPLASTIC SYNDROME: Primary | ICD-10-CM

## 2024-06-21 DIAGNOSIS — D64.9 SYMPTOMATIC ANEMIA: Primary | ICD-10-CM

## 2024-06-21 DIAGNOSIS — Z76.82 STEM CELL TRANSPLANT CANDIDATE: ICD-10-CM

## 2024-06-21 DIAGNOSIS — A04.72 CLOSTRIDIUM DIFFICILE DIARRHEA: ICD-10-CM

## 2024-06-21 DIAGNOSIS — D84.81 IMMUNODEFICIENCY SECONDARY TO NEOPLASM: ICD-10-CM

## 2024-06-21 DIAGNOSIS — R19.7 DIARRHEA, UNSPECIFIED TYPE: ICD-10-CM

## 2024-06-21 DIAGNOSIS — D64.9 ANEMIA: ICD-10-CM

## 2024-06-21 DIAGNOSIS — D61.818 PANCYTOPENIA: ICD-10-CM

## 2024-06-21 DIAGNOSIS — R53.81 PHYSICAL DECONDITIONING: ICD-10-CM

## 2024-06-21 DIAGNOSIS — D69.6 THROMBOCYTOPENIA: ICD-10-CM

## 2024-06-21 DIAGNOSIS — D69.6 ANEMIA WITH LOW PLATELET COUNT: ICD-10-CM

## 2024-06-21 DIAGNOSIS — G47.00 INSOMNIA, UNSPECIFIED TYPE: ICD-10-CM

## 2024-06-21 PROCEDURE — 99291 CRITICAL CARE FIRST HOUR: CPT | Mod: NBTX

## 2024-06-21 PROCEDURE — 93005 ELECTROCARDIOGRAM TRACING: CPT

## 2024-06-21 PROCEDURE — 99999 PR PBB SHADOW E&M-EST. PATIENT-LVL V: CPT | Mod: PBBFAC,,, | Performed by: INTERNAL MEDICINE

## 2024-06-21 PROCEDURE — 36430 TRANSFUSION BLD/BLD COMPNT: CPT

## 2024-06-21 PROCEDURE — 25000003 PHARM REV CODE 250

## 2024-06-21 RX ORDER — DIPHENHYDRAMINE HCL 25 MG
25 CAPSULE ORAL
OUTPATIENT
Start: 2024-06-21

## 2024-06-21 RX ORDER — HYDROCODONE BITARTRATE AND ACETAMINOPHEN 500; 5 MG/1; MG/1
TABLET ORAL
Status: DISCONTINUED | OUTPATIENT
Start: 2024-06-21 | End: 2024-06-22 | Stop reason: HOSPADM

## 2024-06-21 RX ORDER — HYDROCODONE BITARTRATE AND ACETAMINOPHEN 500; 5 MG/1; MG/1
TABLET ORAL ONCE
OUTPATIENT
Start: 2024-06-21 | End: 2024-06-21

## 2024-06-21 RX ORDER — VANCOMYCIN HYDROCHLORIDE 125 MG/1
125 CAPSULE ORAL 4 TIMES DAILY
COMMUNITY
Start: 2024-06-13

## 2024-06-21 RX ORDER — LOPERAMIDE HYDROCHLORIDE 2 MG/1
CAPSULE ORAL
COMMUNITY
Start: 2024-04-08

## 2024-06-21 RX ORDER — ZOLPIDEM TARTRATE 10 MG/1
10 TABLET ORAL NIGHTLY PRN
Qty: 30 TABLET | Refills: 0 | Status: SHIPPED | OUTPATIENT
Start: 2024-06-21

## 2024-06-21 RX ORDER — ACETAMINOPHEN 325 MG/1
650 TABLET ORAL
OUTPATIENT
Start: 2024-06-21

## 2024-06-21 RX ADMIN — SODIUM CHLORIDE: 9 INJECTION, SOLUTION INTRAVENOUS at 08:06

## 2024-06-21 NOTE — ED NOTES
1759: Verbal order for regular diet per Dr. Paris. New order placed and dietary notified, but closed. Provided pt and wife with sandwich and snacks.    Pt states due to take PO Vanc at 1800 and other medication prior to bed. MD notified and approved for pt to take home medication as prescribed. Pt notified and verbalized understanding.

## 2024-06-21 NOTE — TELEPHONE ENCOUNTER
Contacted patient's daughter to advise for patient to go to ER for PRBC and platelet transfusion. Daughter verbalized understanding.

## 2024-06-21 NOTE — ED PROVIDER NOTES
Encounter Date: 6/21/2024       History     Chief Complaint   Patient presents with    abnormal labs     Told to come after getting lab work done r/t Hgb and platelet counts today at doctor's office. Has hx of leukemia. Denies pain. States feeling weak.     68-year-old male referred to the emergency department by his oncologist for treatment anemia and thrombocytopenia.  Patient with history of myelodysplastic syndrome.  Has had multiple transfusions in the past.  States he has been feeling little more fatigued and weak the last few days.  Had blood work done today concerning for anemia and thrombocytopenia.  Denies any bright red blood per rectum or dark tarry stool, chest pain, shortness breath, abdominal pain, vomiting, nosebleeds, hematuria.  No other symptoms reported at this time.      Review of patient's allergies indicates:  No Known Allergies  Past Medical History:   Diagnosis Date    Anticoagulant long-term use     Coronary artery disease     Hypertension     Myelodysplastic syndrome     Peripheral vascular disease, unspecified      Past Surgical History:   Procedure Laterality Date    BONE MARROW BIOPSY Left 4/26/2023    Procedure: Biopsy-bone marrow;  Surgeon: Harry Diamond MD;  Location: [facility] OR;  Service: Oncology;  Laterality: Left;    COLONOSCOPY N/A 01/05/2022    Procedure: COLONOSCOPY Golytely Vaccinated will bring cards;  Surgeon: Dereje Simon MD;  Location: [facility] ENDO;  Service: Endoscopy;  Laterality: N/A;  Do not cancel this order     Family History   Problem Relation Name Age of Onset    Hypertension Mother      Cancer Father      Cancer Brother 9     No Known Problems Daughter      No Known Problems Daughter      No Known Problems Son       Social History     Tobacco Use    Smoking status: Former     Current packs/day: 0.00     Average packs/day: 0.3 packs/day for 50.0 years (12.5 ttl pk-yrs)     Types: Cigarettes     Start date: 3/1/1973     Quit date: 3/1/2023     Years since  quittin.3     Passive exposure: Past    Smokeless tobacco: Never   Substance Use Topics    Alcohol use: Not Currently    Drug use: Never     Review of Systems   Constitutional:  Negative for chills and fever.   HENT:  Negative for congestion.    Respiratory:  Negative for cough and shortness of breath.    Cardiovascular:  Negative for chest pain.   Gastrointestinal:  Negative for abdominal pain.   Musculoskeletal:  Negative for back pain.   Neurological:  Negative for light-headedness and headaches.       Physical Exam     Initial Vitals   BP Pulse Resp Temp SpO2   24 1543 24 1543 24 1543 24 1732 24 1543   (!) 142/67 100 (!) 22 98.5 °F (36.9 °C) 99 %      MAP       --                Physical Exam    Nursing note and vitals reviewed.  Constitutional: He appears well-developed and well-nourished. He is not diaphoretic. No distress.   HENT:   Head: Normocephalic and atraumatic.   Eyes: Conjunctivae and EOM are normal. Pupils are equal, round, and reactive to light.   Neck: Neck supple. No tracheal deviation present.   Normal range of motion.  Cardiovascular:  Normal rate and intact distal pulses.           Pulmonary/Chest: No respiratory distress.   Musculoskeletal:         General: No tenderness or edema. Normal range of motion.      Cervical back: Normal range of motion and neck supple.     Neurological: He is alert and oriented to person, place, and time. He has normal strength. No cranial nerve deficit. GCS score is 15. GCS eye subscore is 4. GCS verbal subscore is 5. GCS motor subscore is 6.   Skin: Skin is warm and dry.         ED Course   Critical Care    Date/Time: 2024 5:41 PM    Performed by: Graham Paris MD  Authorized by: Graham Paris MD  Direct patient critical care time: 10 minutes  Additional history critical care time: 10 minutes  Ordering / reviewing critical care time: 10 minutes  Documentation critical care time: 10 minutes  Consulting other  physicians critical care time: 10 minutes  Consult with family critical care time: 15 minutes  Total critical care time (exclusive of procedural time) : 65 minutes        Labs Reviewed - No data to display  EKG Readings: (Independently Interpreted)   Initial Reading: No STEMI. Previous EKG: Compared with most recent EKG Previous EKG Date: 4/30/2024 (Minimal change). Rhythm: Normal Sinus Rhythm. Heart Rate: 90. Ectopy: No Ectopy. ST Segments: Normal ST Segments. Axis: Normal.   EKG independently interpreted by me pending cardiology review              Medications   0.9%  NaCl infusion (for blood administration) ( Intravenous Stopped 6/21/24 4326)   0.9%  NaCl infusion (for blood administration) (has no administration in time range)   0.9%  NaCl infusion (for blood administration) (has no administration in time range)     Medical Decision Making  68-year-old male presents emergency department for evaluation of anemia and thrombocytopenia      Differential: Myelodysplastic syndrome, bleeding, liver disease, lab error      Discussed with oncology on-call who is very familiar with this patient.  States if patient is amenable and feeling okay we can transfuse in the ER and discharge afterward.  Patient does prefer this at this time.  He has been given 2 units of blood and 1 of platelets.  Vital signs stable.  Instructed to follow up with Oncology Monday, given strict return precautions.    Problems Addressed:  Anemia with low platelet count: acute illness or injury with systemic symptoms that poses a threat to life or bodily functions  Symptomatic anemia: acute illness or injury with systemic symptoms that poses a threat to life or bodily functions    Amount and/or Complexity of Data Reviewed  Independent Historian:      Details: Family member provides translation as patient is Ukrainian-speaking only; offered formal  but declined  External Data Reviewed: labs and notes.     Details: Reviewed labs from outpatient  [facility] earlier today noting anemia and thrombocytopenia     Reviewed most recent Heme-Onc note documenting baseline medications and past medical history  Discussion of management or test interpretation with external provider(s): Discussed with on-call oncologist, reviewed past medical history, presentation, outpatient labs, plans for management    Risk  OTC drugs.  Prescription drug management.  Parenteral controlled substances.    Critical Care  Total time providing critical care: 65 minutes                                      Clinical Impression:  Final diagnoses:  [D64.9] Symptomatic anemia (Primary)  [D69.6] Anemia with low platelet count  [D64.9] Anemia          ED Disposition Condition    Discharge Stable          ED Prescriptions    None       Follow-up Information       Follow up With Specialties Details Why Contact Info    Kal Hargrove MD Family Medicine, Sports Medicine Schedule an appointment as soon as possible for a visit   [address]  [place] [address]  [phone]      Harry Diamond MD Hematology and Oncology Schedule an appointment as soon as possible for a visit   [address] [facility]  [address]  [facility] [address]  [phone]               Graham Paris MD  06/21/24 0361

## 2024-06-21 NOTE — PROGRESS NOTES
Section of Hematology and Stem Cell Transplantation  Follow Up Visit     Date of visit: 6/21/24  Visit diagnosis: Myelodysplastic syndrome [D46.9]  Referred by:  Harry Diamond MD    Oncologic History:     Primary Oncologic Diagnosis: Myelodysplastic syndrome excess blasts 2, IPSS-M very high risk    4/12/23: Initial evaluation by Dr. Diamond of anemia. WBC 2.71, Hgb 7.1, Plts  400. Prior to this visit, he was in [place] for a dental procedure. His symptoms of fatigue started after extractions. Workup by Dr. Diamond unremarkable.   4/26/23: Bone marrow biopsy revealed a hypercellular marrow (80-90%) with increased blasts (8-12%) concerning for MDS EB2. However, sample was limited.   5/23/23: Repeat bone marrow biopsy revealed myelodysplastic syndrome with excess blasts 2 (blasts 16-17%). 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 NGS with ASXL1 (44%), EZH2 (87%), IDH2 (42%), STAG2 (89%), U2AF1 (3%).  6/12/23: Cycle 1 of azacitidine 75 mg/m2 plus venetoclax x14 of 28 days.   7/3/23: Bone marrow biopsy at the end of cycle 1 revealed a hypocellular marrow, no blasts, trilineage hypoplasia and dyspoiesis with increased micromegakaryocytes. FISH with trisomy 8 (three copies of [id number]). NGS with ASXL1 (20%), EZH2 (40%), IDH2 (17%), STAG2 (38%).  9/6/23: Bone marrow biopsy revealed dysplastic changes but blasts were not increased. Diploid karyotype. NGS with ASXL1 (16%).  4/23/24: Bone marrow biopsy revealed a cellular marrow with megakaryocytic hypoplasia with micromegakaryocytes. 5% blasts by flow. FISH normal. NGS with ASXL1 (25%), EZH2 (45%), IDH2 (19%), RUNX1 (19%), STAG2 (37%).  6/18/24: Bone marrow biopsy with persistent MDS without increased blasts.     History of Present Ilness:   Guillaume Salinas (Guillaume) is a pleasant 68 y.o.male with PVD, CAD, HTN who presents for follow up. He remains weak/fatigued. Diarrhea returned once he completed vancomycin, so he recently restarted oral vanc. His diarrhea has  improved someone since then. He is less active than he previously was.     PAST MEDICAL HISTORY:   Past Medical History:   Diagnosis Date    Anticoagulant long-term use     Coronary artery disease     Hypertension     Myelodysplastic syndrome     Peripheral vascular disease, unspecified        PAST SURGICAL HISTORY:   Past Surgical History:   Procedure Laterality Date    BONE MARROW BIOPSY Left 2023    Procedure: Biopsy-bone marrow;  Surgeon: Harry Diamond MD;  Location: [facility] OR;  Service: Oncology;  Laterality: Left;    COLONOSCOPY N/A 2022    Procedure: COLONOSCOPY Golytely Vaccinated will bring cards;  Surgeon: Dereje Simon MD;  Location: [facility] ENDO;  Service: Endoscopy;  Laterality: N/A;  Do not cancel this order       PAST SOCIAL HISTORY:  Social History     Tobacco Use    Smoking status: Former     Current packs/day: 0.00     Average packs/day: 0.3 packs/day for 50.0 years (12.5 ttl pk-yrs)     Types: Cigarettes     Start date: 3/1/1973     Quit date: 3/1/2023     Years since quittin.3     Passive exposure: Past    Smokeless tobacco: Never   Substance Use Topics    Alcohol use: Not Currently    Drug use: Never       FAMILY HISTORY:  Family History   Problem Relation Name Age of Onset    Hypertension Mother      Cancer Father      Cancer Brother 9     No Known Problems Daughter      No Known Problems Daughter      No Known Problems Son         CURRENT MEDICATIONS:   No current facility-administered medications for this visit.     Current Outpatient Medications   Medication Sig    acyclovir (ZOVIRAX) 400 MG tablet Take 1 tablet (400 mg total) by mouth 2 (two) times daily.    carvediloL (COREG) 6.25 MG tablet Take 1 tablet (6.25 mg total) by mouth 2 (two) times daily.    gabapentin (NEURONTIN) 300 MG capsule Take 1 capsule (300 mg total) by mouth 3 (three) times daily.    [START ON 2024] letermovir (PREVYMIS) 480 mg Tab Take 480 mg by mouth Daily.    levoFLOXacin (LEVAQUIN) 500 MG  tablet Take 1 tablet (500 mg total) by mouth once daily.    ondansetron (ZOFRAN) 8 MG tablet Take 1 tablet (8 mg total) by mouth every 8 (eight) hours as needed for Nausea.    pantoprazole (PROTONIX) 40 MG tablet Take 1 tablet (40 mg total) by mouth once daily.    [START ON 7/24/2024] posaconazole (NOXAFIL) 100 mg TbEC tablet Take 3 tablets (300 mg total) by mouth once daily.    promethazine (PHENERGAN) 25 MG tablet Take 1 tablet (25 mg total) by mouth every 8 (eight) hours as needed for Nausea.    vancomycin (VANCOCIN) 125 MG capsule Take 125 mg by mouth 4 (four) times daily.    venetoclax (VENCLEXTA) 100 mg Tab Take 1 tablet (100 mg) by mouth once daily on days 1-7 of a 28-day cycle. Do not discard any remaining tablets.    voriconazole (VFEND) 200 MG Tab Take 1 tablet (200 mg total) by mouth 2 (two) times daily.    loperamide (IMODIUM) 2 mg capsule Take by mouth. (Patient not taking: Reported on 6/21/2024)    zolpidem (AMBIEN) 10 mg Tab Take 1 tablet (10 mg total) by mouth nightly as needed.     Facility-Administered Medications Ordered in Other Visits   Medication    0.9%  NaCl infusion (for blood administration)    0.9%  NaCl infusion (for blood administration)    0.9%  NaCl infusion (for blood administration)       ALLERGIES:   Review of patient's allergies indicates:  No Known Allergies    Review of Systems:     Pertinent positives and negatives included in the HPI. Otherwise a complete review of systems is negative.    Physical Exam:     Vitals:    06/21/24 0818   BP: 127/60   Pulse: 82   Resp: 18   Temp: 98 °F (36.7 °C)     General: Appears well, NAD  Pulmonary: CTAB, no increased work of breathing, no W/R/C  Cardiovascular: S1S2 normal, RRR, no M/R/G  Abdominal: Soft, NT, ND, BS+, no HSM  Extremities: No C/C/E  Neurological: AAOx4, grossly normal, no focal deficits  Dermatologic: No appreciable rashes or lesions     ECOG Performance Status: (foot note - ECOG PS provided by [employer] Cooperative Oncology  Group) 1 - Symptomatic but completely ambulatory    Karnofsky Performance Score:  80%- Normal Activity with Effort: Some Symptoms of Disease    Labs:   Lab Results   Component Value Date    WBC 2.61 (L) 06/21/2024    RBC 1.83 (L) 06/21/2024    HGB 5.8 (LL) 06/21/2024    HCT 17.7 (LL) 06/21/2024    MCV 97 06/21/2024    MCH 31.7 (H) 06/21/2024    MCHC 32.8 06/21/2024    RDW 18.6 (H) 06/21/2024    PLT 7 (LL) 06/21/2024    MPV 10.6 06/21/2024    GRAN 0.9 (L) 06/21/2024    GRAN 34.2 (L) 06/21/2024    LYMPH 1.5 06/21/2024    LYMPH 58.2 (H) 06/21/2024    MONO 0.1 (L) 06/21/2024    MONO 4.2 06/21/2024    EOS 0.1 06/21/2024    BASO 0.00 06/21/2024    EOSINOPHIL 1.9 06/21/2024    BASOPHIL 0.0 06/21/2024       CMP  Sodium   Date Value Ref Range Status   06/21/2024 140 136 - 145 mmol/L Final     Potassium   Date Value Ref Range Status   06/21/2024 4.2 3.5 - 5.1 mmol/L Final     Chloride   Date Value Ref Range Status   06/21/2024 107 95 - 110 mmol/L Final     CO2   Date Value Ref Range Status   06/21/2024 23 23 - 29 mmol/L Final     Glucose   Date Value Ref Range Status   06/21/2024 96 70 - 110 mg/dL Final     BUN   Date Value Ref Range Status   06/21/2024 20 8 - 23 mg/dL Final     Creatinine   Date Value Ref Range Status   06/21/2024 0.9 0.5 - 1.4 mg/dL Final     Calcium   Date Value Ref Range Status   06/21/2024 9.2 8.7 - 10.5 mg/dL Final     Total Protein   Date Value Ref Range Status   06/21/2024 6.6 6.0 - 8.4 g/dL Final     Albumin   Date Value Ref Range Status   06/21/2024 3.9 3.5 - 5.2 g/dL Final     Total Bilirubin   Date Value Ref Range Status   06/21/2024 0.4 0.1 - 1.0 mg/dL Final     Comment:     For infants and newborns, interpretation of results should be based  on gestational age, weight and in agreement with clinical  observations.    Premature Infant recommended reference ranges:  Up to 24 hours.............<8.0 mg/dL  Up to 48 hours............<12.0 mg/dL  3-5 days..................<15.0 mg/dL  6-29  days.................<15.0 mg/dL       Alkaline Phosphatase   Date Value Ref Range Status   06/21/2024 120 55 - 135 U/L Final     AST   Date Value Ref Range Status   06/21/2024 23 10 - 40 U/L Final     ALT   Date Value Ref Range Status   06/21/2024 31 10 - 44 U/L Final     Anion Gap   Date Value Ref Range Status   06/21/2024 10 8 - 16 mmol/L Final     eGFR if    Date Value Ref Range Status   08/27/2021 >60 >60 mL/min/1.73 m^2 Final   08/27/2021 >60 >60 mL/min/1.73 m^2 Final   08/27/2021 >60 >60 mL/min/1.73 m^2 Final     eGFR if non    Date Value Ref Range Status   08/27/2021 57 (A) >60 mL/min/1.73 m^2 Final     Comment:     Calculation used to obtain the estimated glomerular filtration  rate (eGFR) is the CKD-EPI equation.      08/27/2021 57 (A) >60 mL/min/1.73 m^2 Final     Comment:     Calculation used to obtain the estimated glomerular filtration  rate (eGFR) is the CKD-EPI equation.      08/27/2021 57 (A) >60 mL/min/1.73 m^2 Final     Comment:     Calculation used to obtain the estimated glomerular filtration  rate (eGFR) is the CKD-EPI equation.          Imaging:   No results found for this or any previous visit (from the past 2160 hour(s)).    Pathology:  Reviewed     Assessment and Plan:   Guillaume Salinas (Guillaume) is a pleasant 68 y.o.male with PVD, CAD, HTN who presents for follow up.    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 NGS with ASXL1 (44%), EZH2 (87%), IDH2 (42%), STAG2 (89%), U2AF1 (3%). He started treatment with azacitidine 75 mg/m2 daily x7 days plus venetoclax 100mg (voriconazole) daily x14 of 28 days. Venetoclax decreased to 7 days with cycle 3. Bone marrow biopsy 9/6/23 revealed dysplastic changes but blasts were not increased. Diploid karyotype. NGS with ASXL1 (16%). Repeat bone marrow biopsy on 4/23/24 noted persistent MDS without increased blasts. NGS with ASXL1 (25%), EZH2  (45%), IDH2 (19%), RUNX1 (19%), STAG2 (37%). Bone marrow biopsy 6/18/24 showed persistent MDS without blasts.  Continue Aza5/Ven7 for 2 more cycles.   Discussed with Dr. Diamond.    Stem cell transplant candidate: We discussed the role for allogeneic stem cell transplantation in this disease process as a potentially curative option. We had an extensive discussion about the rationale, logistics, risks, and benefits. We reviewed the requirement to stay in the [place] area for 100 days with a caregiver at all times. We discussed the risks, including infection, graft failure, organ toxicity, graft versus host disease, relapse of disease, and secondary cancers. We reviewed the need for long-term immunosuppression and need for close monitoring. HCT-CI of 1 (intermediate risk). We had a prolonged discussion today regarding his current deconditioning, Cdiff, and underlying disease. I am concerned since he has not improved since his hospitalization a few months ago. I believe it would be best to improve his functional status (as he was prior to hospitalization in [place]) and control Cdiff prior to proceeding with transplant. He and his family were in agreement. We will delay his transplant 2 months to work with PT and meet with Dr. Samuel regarding Cdiff.  Coordinator: Fay Stephens  Regimen:  + 2Gy TBI  Donor: son (haplo)   Graft source: BM  Tentative transplant date: 9/10/24    Deconditioning: Will refer to physical therapy to help correct deconditioning from prior hospitalization.     Pancytopenia: Related to MDS. Monitor twice weekly. Transfuse for Hgb <7 or Plts <10.  Labs after visit noted Hgb 5.8 and Plts 7 - he was sent to ER in [place].      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     Peripheral vascular disease: He stopped cilostazol. Continue follow up with cardiology.     Orders/Follow Up:      Orders Placed This Encounter    Ambulatory referral/consult to  Physical/Occupational Therapy         Route Chart for Scheduling    BMT Chart Routing      Follow up with physician 1 month.   Follow up with [facility]    Provider visit type    Infusion scheduling note    Injection scheduling note    Labs CBC, CMP, type and screen, phosphorus and magnesium   Scheduling:  Preferred lab:  Lab interval: twice a week  [place]   Imaging    Pharmacy appointment    Other referrals                Treatment Plan Information   OP AZACITIDINE 7-DAY (SUB-Q)   Harry Diamond MD   Upcoming Treatment Dates - OP AZACITIDINE 7-DAY (SUB-Q)    7/1/2024       Pre-Medications       ondansetron tablet 16 mg       Chemotherapy       azaCITIDine (VIDAZA) chemo injection 140 mg  7/2/2024       Pre-Medications       ondansetron tablet 16 mg       Chemotherapy       azaCITIDine (VIDAZA) chemo injection 140 mg  7/3/2024       Pre-Medications       ondansetron tablet 16 mg       Chemotherapy       azaCITIDine (VIDAZA) chemo injection 140 mg  7/4/2024       Pre-Medications       ondansetron tablet 16 mg       Chemotherapy       azaCITIDine (VIDAZA) chemo injection 140 mg    Therapy Plan Information  cyanocobalamin injection 1,000 mcg  1,000 mcg, Intramuscular, Every visit      Total time of this visit was 40 minutes, including time spent face to face with patient, and also in preparing for today's visit for MDM and documentation. (Medical Decision Making, including consideration of possible diagnoses, management options, complex medical record review, review of diagnostic tests and information, consideration and discussion of significant complications based on comorbidities, and discussion with providers involved with the care of the patient). Greater than 50% was spent face to face with the patient counseling and coordinating care.  Visit today included increased complexity associated with the care of the episodic problem deconditioning and Cdiff addressed and managing the longitudinal care of the patient due to the  serious and/or complex managed problem(s) myelodysplastic syndrome, stem cell transplant candidate, pancytopenia, immunodeficiency.    Paco Hickey MD  Hematology, Oncology, and Stem Cell Transplantation  [facility]

## 2024-06-21 NOTE — Clinical Note
We had a long talk today. I'm worried he has not really recovered since his trip to [place]. We're going to push transplant back 2 months to allow him to work with PT and also get better control of diarrhea. Do you mind arranging 2 more cycles of Aza/Taz?

## 2024-06-22 NOTE — ED NOTES
Patient is for discharge, IV lines removed, discharge summery explained and copy handed over to patient. Patient is stable during discharge.

## 2024-06-22 NOTE — DISCHARGE INSTRUCTIONS
Please follow-up with your primary care physician and hematologist/oncologist.  Please return with any new or worsening symptoms.

## 2024-06-23 LAB
OHS QRS DURATION: 76 MS
OHS QTC CALCULATION: 455 MS

## 2024-06-24 ENCOUNTER — PATIENT OUTREACH (OUTPATIENT)
Dept: EMERGENCY MEDICINE | Facility: HOSPITAL | Age: 69
End: 2024-06-24
Payer: MEDICARE

## 2024-06-24 ENCOUNTER — LAB VISIT (OUTPATIENT)
Dept: LAB | Facility: HOSPITAL | Age: 69
End: 2024-06-24
Attending: INTERNAL MEDICINE
Payer: MEDICARE

## 2024-06-24 ENCOUNTER — TELEPHONE (OUTPATIENT)
Dept: INFUSION THERAPY | Facility: HOSPITAL | Age: 69
End: 2024-06-24
Payer: MEDICARE

## 2024-06-24 ENCOUNTER — DOCUMENTATION ONLY (OUTPATIENT)
Dept: HEMATOLOGY/ONCOLOGY | Facility: CLINIC | Age: 69
End: 2024-06-24
Payer: MEDICARE

## 2024-06-24 DIAGNOSIS — D69.6 THROMBOCYTOPENIA: ICD-10-CM

## 2024-06-24 DIAGNOSIS — Z94.84 IMMUNOCOMPROMISED STATE ASSOCIATED WITH STEM CELL TRANSPLANT: ICD-10-CM

## 2024-06-24 DIAGNOSIS — D84.822 IMMUNOCOMPROMISED STATE ASSOCIATED WITH STEM CELL TRANSPLANT: ICD-10-CM

## 2024-06-24 DIAGNOSIS — D69.6 THROMBOCYTOPENIA: Primary | ICD-10-CM

## 2024-06-24 LAB
ABO + RH BLD: NORMAL
ALBUMIN SERPL BCP-MCNC: 3.6 G/DL (ref 3.5–5.2)
ALP SERPL-CCNC: 123 U/L (ref 55–135)
ALT SERPL W/O P-5'-P-CCNC: 25 U/L (ref 10–44)
ANION GAP SERPL CALC-SCNC: 6 MMOL/L (ref 8–16)
ANISOCYTOSIS BLD QL SMEAR: SLIGHT
AST SERPL-CCNC: 18 U/L (ref 10–40)
BASOPHILS NFR BLD: 0 % (ref 0–1.9)
BILIRUB SERPL-MCNC: 0.4 MG/DL (ref 0.1–1)
BLD GP AB SCN CELLS X3 SERPL QL: NORMAL
BUN SERPL-MCNC: 16 MG/DL (ref 8–23)
CALCIUM SERPL-MCNC: 9.1 MG/DL (ref 8.7–10.5)
CHLORIDE SERPL-SCNC: 108 MMOL/L (ref 95–110)
CO2 SERPL-SCNC: 26 MMOL/L (ref 23–29)
CREAT SERPL-MCNC: 0.8 MG/DL (ref 0.5–1.4)
DIFFERENTIAL METHOD BLD: ABNORMAL
EOSINOPHIL NFR BLD: 0 % (ref 0–8)
ERYTHROCYTE [DISTWIDTH] IN BLOOD BY AUTOMATED COUNT: 16.4 % (ref 11.5–14.5)
EST. GFR  (NO RACE VARIABLE): >60 ML/MIN/1.73 M^2
GLUCOSE SERPL-MCNC: 101 MG/DL (ref 70–110)
HCT VFR BLD AUTO: ABNORMAL % (ref 40–54)
HGB BLD-MCNC: ABNORMAL G/DL (ref 14–18)
IMM GRANULOCYTES # BLD AUTO: ABNORMAL K/UL (ref 0–0.04)
IMM GRANULOCYTES NFR BLD AUTO: ABNORMAL % (ref 0–0.5)
LYMPHOCYTES NFR BLD: 68 % (ref 18–48)
MCH RBC QN AUTO: 30.7 PG (ref 27–31)
MCHC RBC AUTO-ENTMCNC: 33.2 G/DL (ref 32–36)
MCV RBC AUTO: 92 FL (ref 82–98)
MONOCYTES NFR BLD: 4 % (ref 4–15)
NEUTROPHILS NFR BLD: 27 % (ref 38–73)
NEUTS BAND NFR BLD MANUAL: 1 %
NRBC BLD-RTO: 0 /100 WBC
PLATELET # BLD AUTO: 27 K/UL (ref 150–450)
PLATELET BLD QL SMEAR: ABNORMAL
PMV BLD AUTO: 10 FL (ref 9.2–12.9)
POTASSIUM SERPL-SCNC: 4.1 MMOL/L (ref 3.5–5.1)
PROT SERPL-MCNC: 6.3 G/DL (ref 6–8.4)
RBC # BLD AUTO: 2.74 M/UL (ref 4.6–6.2)
SODIUM SERPL-SCNC: 140 MMOL/L (ref 136–145)
SPECIMEN OUTDATE: NORMAL
WBC # BLD AUTO: 1.88 K/UL (ref 3.9–12.7)

## 2024-06-24 PROCEDURE — 85027 COMPLETE CBC AUTOMATED: CPT | Performed by: INTERNAL MEDICINE

## 2024-06-24 PROCEDURE — 85007 BL SMEAR W/DIFF WBC COUNT: CPT | Performed by: INTERNAL MEDICINE

## 2024-06-24 PROCEDURE — 80053 COMPREHEN METABOLIC PANEL: CPT | Performed by: INTERNAL MEDICINE

## 2024-06-24 PROCEDURE — 36415 COLL VENOUS BLD VENIPUNCTURE: CPT | Performed by: INTERNAL MEDICINE

## 2024-06-24 PROCEDURE — 86900 BLOOD TYPING SEROLOGIC ABO: CPT | Performed by: INTERNAL MEDICINE

## 2024-06-24 PROCEDURE — 86850 RBC ANTIBODY SCREEN: CPT | Performed by: INTERNAL MEDICINE

## 2024-06-25 ENCOUNTER — TELEPHONE (OUTPATIENT)
Dept: HEMATOLOGY/ONCOLOGY | Facility: CLINIC | Age: 69
End: 2024-06-25
Payer: MEDICARE

## 2024-06-25 DIAGNOSIS — M79.2 NEUROPATHIC PAIN: ICD-10-CM

## 2024-06-25 DIAGNOSIS — D84.81 IMMUNODEFICIENCY SECONDARY TO NEOPLASM: ICD-10-CM

## 2024-06-25 DIAGNOSIS — D49.9 IMMUNODEFICIENCY SECONDARY TO NEOPLASM: ICD-10-CM

## 2024-06-25 DIAGNOSIS — Z94.84 IMMUNOCOMPROMISED STATE ASSOCIATED WITH STEM CELL TRANSPLANT: ICD-10-CM

## 2024-06-25 DIAGNOSIS — D84.822 IMMUNOCOMPROMISED STATE ASSOCIATED WITH STEM CELL TRANSPLANT: ICD-10-CM

## 2024-06-25 RX ORDER — GABAPENTIN 300 MG/1
300 CAPSULE ORAL 3 TIMES DAILY
Qty: 90 CAPSULE | Refills: 11 | Status: SHIPPED | OUTPATIENT
Start: 2024-06-25

## 2024-06-25 RX ORDER — ACYCLOVIR 400 MG/1
400 TABLET ORAL 2 TIMES DAILY
Qty: 60 TABLET | Refills: 11 | Status: SHIPPED | OUTPATIENT
Start: 2024-06-25

## 2024-06-25 RX ORDER — LEVOFLOXACIN 500 MG/1
500 TABLET, FILM COATED ORAL DAILY
Qty: 30 TABLET | Refills: 11 | Status: SHIPPED | OUTPATIENT
Start: 2024-06-25

## 2024-06-25 RX ORDER — VORICONAZOLE 200 MG/1
200 TABLET, FILM COATED ORAL 2 TIMES DAILY
Qty: 60 TABLET | Refills: 11 | Status: SHIPPED | OUTPATIENT
Start: 2024-06-25 | End: 2025-06-20

## 2024-06-25 NOTE — TELEPHONE ENCOUNTER
"----- Message from Clay Marcano sent at 6/25/2024  4:15 PM CDT -----  Consult/Advisory    Name Of Caller: [facility]    Contact Preference?:  [phone]        Auth # [id number]    Provider Name:  Ruperto    Does patient feel the need to be seen today? No    What is the nature of the call?: Calling to inform that they received a PA request earlier today, however they already have prior auth on file for letermovir (PREVYMIS) 480 mg Tab refill    Additional Notes:  "Thank you for all that you do for our patients"  "

## 2024-06-27 ENCOUNTER — PATIENT MESSAGE (OUTPATIENT)
Dept: HEMATOLOGY/ONCOLOGY | Facility: CLINIC | Age: 69
End: 2024-06-27
Payer: MEDICARE

## 2024-06-27 ENCOUNTER — LAB VISIT (OUTPATIENT)
Dept: LAB | Facility: HOSPITAL | Age: 69
End: 2024-06-27
Attending: INTERNAL MEDICINE
Payer: MEDICARE

## 2024-06-27 ENCOUNTER — PATIENT MESSAGE (OUTPATIENT)
Dept: INFECTIOUS DISEASES | Facility: CLINIC | Age: 69
End: 2024-06-27
Payer: MEDICARE

## 2024-06-27 ENCOUNTER — PATIENT MESSAGE (OUTPATIENT)
Dept: GASTROENTEROLOGY | Facility: CLINIC | Age: 69
End: 2024-06-27
Payer: MEDICARE

## 2024-06-27 ENCOUNTER — TELEPHONE (OUTPATIENT)
Dept: HEMATOLOGY/ONCOLOGY | Facility: CLINIC | Age: 69
End: 2024-06-27
Payer: MEDICARE

## 2024-06-27 DIAGNOSIS — Z76.82 STEM CELL TRANSPLANT CANDIDATE: ICD-10-CM

## 2024-06-27 DIAGNOSIS — D69.6 THROMBOCYTOPENIA: Primary | ICD-10-CM

## 2024-06-27 DIAGNOSIS — D46.9 MYELODYSPLASTIC SYNDROME: ICD-10-CM

## 2024-06-27 DIAGNOSIS — D69.6 THROMBOCYTOPENIA: ICD-10-CM

## 2024-06-27 LAB
ABO GROUP BLD: NORMAL
ALBUMIN SERPL BCP-MCNC: 3.8 G/DL (ref 3.5–5.2)
ALP SERPL-CCNC: 131 U/L (ref 55–135)
ALT SERPL W/O P-5'-P-CCNC: 20 U/L (ref 10–44)
ANION GAP SERPL CALC-SCNC: 8 MMOL/L (ref 8–16)
ANISOCYTOSIS BLD QL SMEAR: SLIGHT
AST SERPL-CCNC: 15 U/L (ref 10–40)
BASOPHILS NFR BLD: 0 % (ref 0–1.9)
BILIRUB SERPL-MCNC: 0.6 MG/DL (ref 0.1–1)
BLD GP AB SCN CELLS X3 SERPL QL: NORMAL
BUN SERPL-MCNC: 12 MG/DL (ref 8–23)
CALCIUM SERPL-MCNC: 9.9 MG/DL (ref 8.7–10.5)
CHLORIDE SERPL-SCNC: 107 MMOL/L (ref 95–110)
CO2 SERPL-SCNC: 26 MMOL/L (ref 23–29)
CREAT SERPL-MCNC: 0.9 MG/DL (ref 0.5–1.4)
DIFFERENTIAL METHOD BLD: ABNORMAL
EOSINOPHIL NFR BLD: 0 % (ref 0–8)
ERYTHROCYTE [DISTWIDTH] IN BLOOD BY AUTOMATED COUNT: 15.3 % (ref 11.5–14.5)
EST. GFR  (NO RACE VARIABLE): >60 ML/MIN/1.73 M^2
GLUCOSE SERPL-MCNC: 117 MG/DL (ref 70–110)
HCT VFR BLD AUTO: 24 % (ref 40–54)
HGB BLD-MCNC: 8 G/DL (ref 14–18)
HYPOCHROMIA BLD QL SMEAR: ABNORMAL
IMM GRANULOCYTES # BLD AUTO: ABNORMAL K/UL (ref 0–0.04)
IMM GRANULOCYTES NFR BLD AUTO: ABNORMAL % (ref 0–0.5)
LYMPHOCYTES NFR BLD: 79 % (ref 18–48)
MAGNESIUM SERPL-MCNC: 2.2 MG/DL (ref 1.6–2.6)
MCH RBC QN AUTO: 30.8 PG (ref 27–31)
MCHC RBC AUTO-ENTMCNC: 33.3 G/DL (ref 32–36)
MCV RBC AUTO: 92 FL (ref 82–98)
MONOCYTES NFR BLD: 0 % (ref 4–15)
NEUTROPHILS NFR BLD: 21 % (ref 38–73)
NRBC BLD-RTO: 0 /100 WBC
PHOSPHATE SERPL-MCNC: 4.7 MG/DL (ref 2.7–4.5)
PLATELET # BLD AUTO: 13 K/UL (ref 150–450)
PLATELET BLD QL SMEAR: ABNORMAL
PMV BLD AUTO: 11.9 FL (ref 9.2–12.9)
POTASSIUM SERPL-SCNC: 4.5 MMOL/L (ref 3.5–5.1)
PROT SERPL-MCNC: 6.6 G/DL (ref 6–8.4)
RBC # BLD AUTO: 2.6 M/UL (ref 4.6–6.2)
RH BLD: NORMAL
SODIUM SERPL-SCNC: 141 MMOL/L (ref 136–145)
SPECIMEN OUTDATE: NORMAL
WBC # BLD AUTO: 1.5 K/UL (ref 3.9–12.7)

## 2024-06-27 PROCEDURE — P9037 PLATE PHERES LEUKOREDU IRRAD: HCPCS | Performed by: INTERNAL MEDICINE

## 2024-06-27 PROCEDURE — 36415 COLL VENOUS BLD VENIPUNCTURE: CPT | Performed by: INTERNAL MEDICINE

## 2024-06-27 PROCEDURE — 80053 COMPREHEN METABOLIC PANEL: CPT | Performed by: INTERNAL MEDICINE

## 2024-06-27 PROCEDURE — 86901 BLOOD TYPING SEROLOGIC RH(D): CPT | Performed by: INTERNAL MEDICINE

## 2024-06-27 PROCEDURE — 86900 BLOOD TYPING SEROLOGIC ABO: CPT | Performed by: INTERNAL MEDICINE

## 2024-06-27 PROCEDURE — 85007 BL SMEAR W/DIFF WBC COUNT: CPT | Performed by: INTERNAL MEDICINE

## 2024-06-27 PROCEDURE — 83735 ASSAY OF MAGNESIUM: CPT | Performed by: INTERNAL MEDICINE

## 2024-06-27 PROCEDURE — 86850 RBC ANTIBODY SCREEN: CPT | Performed by: INTERNAL MEDICINE

## 2024-06-27 PROCEDURE — 85027 COMPLETE CBC AUTOMATED: CPT | Performed by: INTERNAL MEDICINE

## 2024-06-27 PROCEDURE — 84100 ASSAY OF PHOSPHORUS: CPT | Performed by: INTERNAL MEDICINE

## 2024-06-27 RX ORDER — DIPHENHYDRAMINE HCL 25 MG
25 CAPSULE ORAL
Status: CANCELLED | OUTPATIENT
Start: 2024-06-27

## 2024-06-27 RX ORDER — FUROSEMIDE 10 MG/ML
20 INJECTION INTRAMUSCULAR; INTRAVENOUS
Status: CANCELLED | OUTPATIENT
Start: 2024-06-27

## 2024-06-27 RX ORDER — ACETAMINOPHEN 325 MG/1
650 TABLET ORAL
Status: CANCELLED | OUTPATIENT
Start: 2024-06-27

## 2024-06-27 RX ORDER — HYDROCODONE BITARTRATE AND ACETAMINOPHEN 500; 5 MG/1; MG/1
TABLET ORAL ONCE
Status: CANCELLED | OUTPATIENT
Start: 2024-06-27 | End: 2024-06-27

## 2024-06-28 ENCOUNTER — INFUSION (OUTPATIENT)
Dept: INFUSION THERAPY | Facility: HOSPITAL | Age: 69
End: 2024-06-28
Attending: INTERNAL MEDICINE
Payer: MEDICARE

## 2024-06-28 VITALS
DIASTOLIC BLOOD PRESSURE: 65 MMHG | HEART RATE: 75 BPM | TEMPERATURE: 98 F | RESPIRATION RATE: 16 BRPM | OXYGEN SATURATION: 99 % | SYSTOLIC BLOOD PRESSURE: 106 MMHG

## 2024-06-28 DIAGNOSIS — D46.9 MYELODYSPLASTIC SYNDROME: ICD-10-CM

## 2024-06-28 DIAGNOSIS — D69.6 THROMBOCYTOPENIA: ICD-10-CM

## 2024-06-28 LAB
BLD PROD TYP BPU: NORMAL
BLOOD UNIT EXPIRATION DATE: NORMAL
BLOOD UNIT TYPE CODE: 6200
BLOOD UNIT TYPE: NORMAL
CODING SYSTEM: NORMAL
CROSSMATCH INTERPRETATION: NORMAL
DISPENSE STATUS: NORMAL
UNIT NUMBER: NORMAL

## 2024-06-28 PROCEDURE — 36430 TRANSFUSION BLD/BLD COMPNT: CPT

## 2024-06-28 RX ORDER — ONDANSETRON HYDROCHLORIDE 8 MG/1
16 TABLET, FILM COATED ORAL
OUTPATIENT
Start: 2024-07-04

## 2024-06-28 RX ORDER — AZACITIDINE 100 MG/1
75 INJECTION, POWDER, LYOPHILIZED, FOR SOLUTION INTRAVENOUS; SUBCUTANEOUS
OUTPATIENT
Start: 2024-07-04

## 2024-06-28 RX ORDER — AZACITIDINE 100 MG/1
75 INJECTION, POWDER, LYOPHILIZED, FOR SOLUTION INTRAVENOUS; SUBCUTANEOUS
OUTPATIENT
Start: 2024-07-01

## 2024-06-28 RX ORDER — AZACITIDINE 100 MG/1
75 INJECTION, POWDER, LYOPHILIZED, FOR SOLUTION INTRAVENOUS; SUBCUTANEOUS
OUTPATIENT
Start: 2024-07-02

## 2024-06-28 RX ORDER — ACETAMINOPHEN 325 MG/1
650 TABLET ORAL
Status: DISCONTINUED | OUTPATIENT
Start: 2024-06-28 | End: 2024-06-28 | Stop reason: HOSPADM

## 2024-06-28 RX ORDER — FUROSEMIDE 10 MG/ML
20 INJECTION INTRAMUSCULAR; INTRAVENOUS
Status: DISCONTINUED | OUTPATIENT
Start: 2024-06-28 | End: 2024-06-28 | Stop reason: HOSPADM

## 2024-06-28 RX ORDER — ONDANSETRON HYDROCHLORIDE 8 MG/1
16 TABLET, FILM COATED ORAL
OUTPATIENT
Start: 2024-07-03

## 2024-06-28 RX ORDER — HYDROCODONE BITARTRATE AND ACETAMINOPHEN 500; 5 MG/1; MG/1
TABLET ORAL ONCE
Status: DISCONTINUED | OUTPATIENT
Start: 2024-06-28 | End: 2024-06-28 | Stop reason: HOSPADM

## 2024-06-28 RX ORDER — DIPHENHYDRAMINE HCL 25 MG
25 CAPSULE ORAL
Status: DISCONTINUED | OUTPATIENT
Start: 2024-06-28 | End: 2024-06-28 | Stop reason: HOSPADM

## 2024-06-28 RX ORDER — AZACITIDINE 100 MG/1
75 INJECTION, POWDER, LYOPHILIZED, FOR SOLUTION INTRAVENOUS; SUBCUTANEOUS
OUTPATIENT
Start: 2024-07-05

## 2024-06-28 RX ORDER — AZACITIDINE 100 MG/1
75 INJECTION, POWDER, LYOPHILIZED, FOR SOLUTION INTRAVENOUS; SUBCUTANEOUS
OUTPATIENT
Start: 2024-07-03

## 2024-06-28 RX ORDER — ONDANSETRON HYDROCHLORIDE 8 MG/1
16 TABLET, FILM COATED ORAL
OUTPATIENT
Start: 2024-07-05

## 2024-06-28 RX ORDER — ONDANSETRON HYDROCHLORIDE 8 MG/1
16 TABLET, FILM COATED ORAL
OUTPATIENT
Start: 2024-07-01

## 2024-06-28 RX ORDER — ONDANSETRON HYDROCHLORIDE 8 MG/1
16 TABLET, FILM COATED ORAL
OUTPATIENT
Start: 2024-07-02

## 2024-06-28 NOTE — PLAN OF CARE
Patient tolerated 1unit of platelets well. No s/s adverse reaction. Next appt reviewed with patient, PIV removed and patient ambulated out of clinic in [facility].

## 2024-06-30 ENCOUNTER — TELEPHONE (OUTPATIENT)
Dept: INFECTIOUS DISEASES | Facility: CLINIC | Age: 69
End: 2024-06-30
Payer: MEDICARE

## 2024-06-30 RX ORDER — VANCOMYCIN HYDROCHLORIDE 125 MG/1
125 CAPSULE ORAL 4 TIMES DAILY
Qty: 120 CAPSULE | Refills: 0 | Status: SHIPPED | OUTPATIENT
Start: 2024-06-30 | End: 2024-06-30 | Stop reason: SDUPTHER

## 2024-07-01 ENCOUNTER — INFUSION (OUTPATIENT)
Dept: INFUSION THERAPY | Facility: HOSPITAL | Age: 69
End: 2024-07-01
Attending: INTERNAL MEDICINE
Payer: MEDICARE

## 2024-07-01 ENCOUNTER — LAB VISIT (OUTPATIENT)
Dept: LAB | Facility: HOSPITAL | Age: 69
End: 2024-07-01
Attending: INTERNAL MEDICINE
Payer: MEDICARE

## 2024-07-01 ENCOUNTER — TELEPHONE (OUTPATIENT)
Dept: HEMATOLOGY/ONCOLOGY | Facility: CLINIC | Age: 69
End: 2024-07-01
Payer: MEDICARE

## 2024-07-01 VITALS
WEIGHT: 162.5 LBS | BODY MASS INDEX: 24.63 KG/M2 | DIASTOLIC BLOOD PRESSURE: 75 MMHG | TEMPERATURE: 98 F | RESPIRATION RATE: 16 BRPM | HEART RATE: 69 BPM | SYSTOLIC BLOOD PRESSURE: 121 MMHG | OXYGEN SATURATION: 99 % | HEIGHT: 68 IN

## 2024-07-01 DIAGNOSIS — D46.9 MYELODYSPLASTIC SYNDROME: Primary | ICD-10-CM

## 2024-07-01 DIAGNOSIS — D46.9 MYELODYSPLASTIC SYNDROME: ICD-10-CM

## 2024-07-01 DIAGNOSIS — Z76.82 STEM CELL TRANSPLANT CANDIDATE: ICD-10-CM

## 2024-07-01 LAB
ABO + RH BLD: NORMAL
ALBUMIN SERPL BCP-MCNC: 3.9 G/DL (ref 3.5–5.2)
ALP SERPL-CCNC: 140 U/L (ref 55–135)
ALT SERPL W/O P-5'-P-CCNC: 21 U/L (ref 10–44)
ANION GAP SERPL CALC-SCNC: 7 MMOL/L (ref 8–16)
ANISOCYTOSIS BLD QL SMEAR: SLIGHT
AST SERPL-CCNC: 19 U/L (ref 10–40)
BASOPHILS NFR BLD: 1 % (ref 0–1.9)
BILIRUB SERPL-MCNC: 0.3 MG/DL (ref 0.1–1)
BLD GP AB SCN CELLS X3 SERPL QL: NORMAL
BUN SERPL-MCNC: 16 MG/DL (ref 8–23)
CALCIUM SERPL-MCNC: 9.5 MG/DL (ref 8.7–10.5)
CHLORIDE SERPL-SCNC: 107 MMOL/L (ref 95–110)
CO2 SERPL-SCNC: 26 MMOL/L (ref 23–29)
CREAT SERPL-MCNC: 0.9 MG/DL (ref 0.5–1.4)
DIFFERENTIAL METHOD BLD: ABNORMAL
EOSINOPHIL NFR BLD: 3 % (ref 0–8)
ERYTHROCYTE [DISTWIDTH] IN BLOOD BY AUTOMATED COUNT: 14.6 % (ref 11.5–14.5)
EST. GFR  (NO RACE VARIABLE): >60 ML/MIN/1.73 M^2
GLUCOSE SERPL-MCNC: 128 MG/DL (ref 70–110)
HCT VFR BLD AUTO: 23 % (ref 40–54)
HGB BLD-MCNC: 7.6 G/DL (ref 14–18)
HYPOCHROMIA BLD QL SMEAR: ABNORMAL
IMM GRANULOCYTES # BLD AUTO: ABNORMAL K/UL (ref 0–0.04)
IMM GRANULOCYTES NFR BLD AUTO: ABNORMAL % (ref 0–0.5)
LYMPHOCYTES NFR BLD: 69 % (ref 18–48)
MAGNESIUM SERPL-MCNC: 2.1 MG/DL (ref 1.6–2.6)
MCH RBC QN AUTO: 30.5 PG (ref 27–31)
MCHC RBC AUTO-ENTMCNC: 33 G/DL (ref 32–36)
MCV RBC AUTO: 92 FL (ref 82–98)
MONOCYTES NFR BLD: 1 % (ref 4–15)
NEUTROPHILS NFR BLD: 26 % (ref 38–73)
NRBC BLD-RTO: 0 /100 WBC
PHOSPHATE SERPL-MCNC: 4.1 MG/DL (ref 2.7–4.5)
PLATELET # BLD AUTO: 20 K/UL (ref 150–450)
PLATELET BLD QL SMEAR: ABNORMAL
PMV BLD AUTO: 10 FL (ref 9.2–12.9)
POTASSIUM SERPL-SCNC: 4.2 MMOL/L (ref 3.5–5.1)
PROT SERPL-MCNC: 6.7 G/DL (ref 6–8.4)
RBC # BLD AUTO: 2.49 M/UL (ref 4.6–6.2)
SODIUM SERPL-SCNC: 140 MMOL/L (ref 136–145)
SPECIMEN OUTDATE: NORMAL
WBC # BLD AUTO: 1.78 K/UL (ref 3.9–12.7)

## 2024-07-01 PROCEDURE — 96401 CHEMO ANTI-NEOPL SQ/IM: CPT

## 2024-07-01 PROCEDURE — 83735 ASSAY OF MAGNESIUM: CPT | Performed by: INTERNAL MEDICINE

## 2024-07-01 PROCEDURE — 86850 RBC ANTIBODY SCREEN: CPT | Performed by: INTERNAL MEDICINE

## 2024-07-01 PROCEDURE — 63600175 PHARM REV CODE 636 W HCPCS: Performed by: INTERNAL MEDICINE

## 2024-07-01 PROCEDURE — 80053 COMPREHEN METABOLIC PANEL: CPT | Performed by: INTERNAL MEDICINE

## 2024-07-01 PROCEDURE — 25000003 PHARM REV CODE 250: Performed by: INTERNAL MEDICINE

## 2024-07-01 PROCEDURE — 86900 BLOOD TYPING SEROLOGIC ABO: CPT | Performed by: INTERNAL MEDICINE

## 2024-07-01 PROCEDURE — 85027 COMPLETE CBC AUTOMATED: CPT | Performed by: INTERNAL MEDICINE

## 2024-07-01 PROCEDURE — 85007 BL SMEAR W/DIFF WBC COUNT: CPT | Performed by: INTERNAL MEDICINE

## 2024-07-01 PROCEDURE — 36415 COLL VENOUS BLD VENIPUNCTURE: CPT | Performed by: INTERNAL MEDICINE

## 2024-07-01 PROCEDURE — 84100 ASSAY OF PHOSPHORUS: CPT | Performed by: INTERNAL MEDICINE

## 2024-07-01 RX ORDER — AZACITIDINE 100 MG/1
75 INJECTION, POWDER, LYOPHILIZED, FOR SOLUTION INTRAVENOUS; SUBCUTANEOUS
Status: COMPLETED | OUTPATIENT
Start: 2024-07-01 | End: 2024-07-01

## 2024-07-01 RX ORDER — VANCOMYCIN HYDROCHLORIDE 125 MG/1
125 CAPSULE ORAL 4 TIMES DAILY
Qty: 120 CAPSULE | Refills: 0 | Status: SHIPPED | OUTPATIENT
Start: 2024-07-01 | End: 2024-07-31

## 2024-07-01 RX ORDER — ONDANSETRON HYDROCHLORIDE 8 MG/1
16 TABLET, FILM COATED ORAL
Status: COMPLETED | OUTPATIENT
Start: 2024-07-01 | End: 2024-07-01

## 2024-07-01 RX ADMIN — ONDANSETRON HYDROCHLORIDE 16 MG: 8 TABLET, FILM COATED ORAL at 08:07

## 2024-07-01 RX ADMIN — AZACITIDINE 140 MG: 100 INJECTION, POWDER, LYOPHILIZED, FOR SOLUTION INTRAVENOUS; SUBCUTANEOUS at 08:07

## 2024-07-01 NOTE — PLAN OF CARE
Patient tolerated Vidaza injection x 2 to right abdominal tissue. No s/s adverse reaction. Next appt reviewed with patient. Patient ambulated out of clinic in [facility].

## 2024-07-02 ENCOUNTER — INFUSION (OUTPATIENT)
Dept: INFUSION THERAPY | Facility: HOSPITAL | Age: 69
End: 2024-07-02
Attending: INTERNAL MEDICINE
Payer: MEDICARE

## 2024-07-02 ENCOUNTER — PATIENT MESSAGE (OUTPATIENT)
Dept: INFECTIOUS DISEASES | Facility: CLINIC | Age: 69
End: 2024-07-02

## 2024-07-02 ENCOUNTER — OFFICE VISIT (OUTPATIENT)
Dept: INFECTIOUS DISEASES | Facility: CLINIC | Age: 69
End: 2024-07-02
Payer: MEDICARE

## 2024-07-02 VITALS
TEMPERATURE: 98 F | HEART RATE: 77 BPM | RESPIRATION RATE: 16 BRPM | SYSTOLIC BLOOD PRESSURE: 116 MMHG | DIASTOLIC BLOOD PRESSURE: 73 MMHG | BODY MASS INDEX: 24.7 KG/M2 | OXYGEN SATURATION: 98 % | WEIGHT: 162.5 LBS

## 2024-07-02 VITALS
HEIGHT: 68 IN | TEMPERATURE: 99 F | DIASTOLIC BLOOD PRESSURE: 61 MMHG | WEIGHT: 162.69 LBS | HEART RATE: 81 BPM | BODY MASS INDEX: 24.66 KG/M2 | SYSTOLIC BLOOD PRESSURE: 109 MMHG

## 2024-07-02 DIAGNOSIS — D46.9 MYELODYSPLASTIC SYNDROME: Primary | ICD-10-CM

## 2024-07-02 DIAGNOSIS — Z76.82 STEM CELL TRANSPLANT CANDIDATE: ICD-10-CM

## 2024-07-02 DIAGNOSIS — A04.72 CLOSTRIDIUM DIFFICILE DIARRHEA: ICD-10-CM

## 2024-07-02 DIAGNOSIS — D46.9 MYELODYSPLASTIC SYNDROME: ICD-10-CM

## 2024-07-02 PROCEDURE — 99999 PR PBB SHADOW E&M-EST. PATIENT-LVL IV: CPT | Mod: PBBFAC,,, | Performed by: INTERNAL MEDICINE

## 2024-07-02 PROCEDURE — 63600175 PHARM REV CODE 636 W HCPCS: Performed by: INTERNAL MEDICINE

## 2024-07-02 PROCEDURE — 96401 CHEMO ANTI-NEOPL SQ/IM: CPT

## 2024-07-02 PROCEDURE — 25000003 PHARM REV CODE 250: Performed by: INTERNAL MEDICINE

## 2024-07-02 RX ORDER — ONDANSETRON HYDROCHLORIDE 8 MG/1
16 TABLET, FILM COATED ORAL
Status: COMPLETED | OUTPATIENT
Start: 2024-07-02 | End: 2024-07-02

## 2024-07-02 RX ORDER — AZACITIDINE 100 MG/1
75 INJECTION, POWDER, LYOPHILIZED, FOR SOLUTION INTRAVENOUS; SUBCUTANEOUS
Status: COMPLETED | OUTPATIENT
Start: 2024-07-02 | End: 2024-07-02

## 2024-07-02 RX ADMIN — AZACITIDINE 140 MG: 100 INJECTION, POWDER, LYOPHILIZED, FOR SOLUTION INTRAVENOUS; SUBCUTANEOUS at 09:07

## 2024-07-02 RX ADMIN — ONDANSETRON HYDROCHLORIDE 16 MG: 8 TABLET, FILM COATED ORAL at 08:07

## 2024-07-02 NOTE — PROGRESS NOTES
Encounter performed using [employer]    Subjective:      Chief Complaint: Recurrent C difficile    History of Present Illness  68M with histpry of CAD, MDS on aza/nilda with plans for stem cell transplant presents for follow-up for C difficile.    Summary of Illness:  Patient presented to OSH in [place] with neutropenic fever and dark stools. He had an EGD which showed esophagitis, biopsy notable for inflammation, but no malignancy.     Patient's main complaint was having diarrhea since discharge from his hospital. Reports having 15 episodes of diarrhea per day. Notes abdominal cramping prior to diarrhea. He was seen by Dr. Flores and he was started empirically on vancomycin PO for C difficile.    Subjective:  Patient completed first course of vancomycin PO and four days later had recurrence of diarrhea. A second course of vancomycin was restarted. Patient reports having 2 loose stools per day now. Denied having any fevers, chills, abdominal pain.    His main complaint is insomnia. He also is having radicular pain. He is on zolpidem and gabapentin but these have not helped with these issues.    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   HENT:  Negative for congestion, ear pain, hearing loss, hoarse voice, sore throat and tinnitus.    Eyes:  Negative for blurred vision, redness and visual disturbance.   Cardiovascular:  Negative for chest pain, leg swelling and palpitations.   Respiratory:  Negative for cough, hemoptysis, shortness of breath, sputum production and wheezing.    Hematologic/Lymphatic: Negative for adenopathy. Does not bruise/bleed  easily.   Skin:  Negative for dry skin, itching, rash and suspicious lesions.   Musculoskeletal:  Positive for myalgias. Negative for back pain, joint pain and neck pain.   Gastrointestinal:  Positive for nausea. Negative for abdominal pain, constipation, diarrhea, heartburn and vomiting.   Genitourinary:  Negative for dysuria, flank pain, frequency, hematuria, hesitancy and urgency.   Neurological:  Positive for dizziness and weakness. Negative for headaches, numbness and paresthesias.   Psychiatric/Behavioral:  Negative for depression and memory loss. The patient has insomnia. The patient is not nervous/anxious.      Objective:   Physical Exam  Vitals reviewed.   Constitutional:       General: He is not in acute distress.     Appearance: He is well-developed. He is not diaphoretic.   HENT:      Head: Normocephalic and atraumatic.      Nose: Nose normal.   Eyes:      Conjunctiva/sclera: Conjunctivae normal.   Pulmonary:      Effort: Pulmonary effort is normal. No respiratory distress.   Abdominal:      General: Abdomen is flat. There is no distension.      Comments: Abdomen with some redness around area of chemo injections   Musculoskeletal:      Cervical back: Normal range of motion.      Right lower leg: No edema.      Left lower leg: No edema.   Skin:     General: Skin is warm and dry.      Findings: No erythema or rash.   Neurological:      Mental Status: He is alert.   Psychiatric:         Behavior: Behavior normal.           Significant results reviewed:    Sodium   Date Value Ref Range Status   07/01/2024 140 136 - 145 mmol/L Final   06/27/2024 141 136 - 145 mmol/L Final   06/24/2024 140 136 - 145 mmol/L Final      Potassium   Date Value Ref Range Status   07/01/2024 4.2 3.5 - 5.1 mmol/L Final   06/27/2024 4.5 3.5 - 5.1 mmol/L Final   06/24/2024 4.1 3.5 - 5.1 mmol/L Final      Chloride   Date Value Ref Range Status   07/01/2024 107 95 - 110 mmol/L Final   06/27/2024 107 95 - 110 mmol/L Final   06/24/2024 108  95 - 110 mmol/L Final      CO2   Date Value Ref Range Status   07/01/2024 26 23 - 29 mmol/L Final   06/27/2024 26 23 - 29 mmol/L Final   06/24/2024 26 23 - 29 mmol/L Final      BUN   Date Value Ref Range Status   07/01/2024 16 8 - 23 mg/dL Final   06/27/2024 12 8 - 23 mg/dL Final   06/24/2024 16 8 - 23 mg/dL Final      Creatinine   Date Value Ref Range Status   07/01/2024 0.9 0.5 - 1.4 mg/dL Final   06/27/2024 0.9 0.5 - 1.4 mg/dL Final   06/24/2024 0.8 0.5 - 1.4 mg/dL Final      Glucose   Date Value Ref Range Status   07/01/2024 128 (H) 70 - 110 mg/dL Final   06/27/2024 117 (H) 70 - 110 mg/dL Final   06/24/2024 101 70 - 110 mg/dL Final       ALT   Date Value Ref Range Status   07/01/2024 21 10 - 44 U/L Final   06/27/2024 20 10 - 44 U/L Final   06/24/2024 25 10 - 44 U/L Final      AST   Date Value Ref Range Status   07/01/2024 19 10 - 40 U/L Final   06/27/2024 15 10 - 40 U/L Final   06/24/2024 18 10 - 40 U/L Final      Total Bilirubin   Date Value Ref Range Status   07/01/2024 0.3 0.1 - 1.0 mg/dL Final     Comment:     For infants and newborns, interpretation of results should be based  on gestational age, weight and in agreement with clinical  observations.    Premature Infant recommended reference ranges:  Up to 24 hours.............<8.0 mg/dL  Up to 48 hours............<12.0 mg/dL  3-5 days..................<15.0 mg/dL  6-29 days.................<15.0 mg/dL     06/27/2024 0.6 0.1 - 1.0 mg/dL Final     Comment:     For infants and newborns, interpretation of results should be based  on gestational age, weight and in agreement with clinical  observations.    Premature Infant recommended reference ranges:  Up to 24 hours.............<8.0 mg/dL  Up to 48 hours............<12.0 mg/dL  3-5 days..................<15.0 mg/dL  6-29 days.................<15.0 mg/dL     06/24/2024 0.4 0.1 - 1.0 mg/dL Final     Comment:     For infants and newborns, interpretation of results should be based  on gestational age, weight and in  agreement with clinical  observations.    Premature Infant recommended reference ranges:  Up to 24 hours.............<8.0 mg/dL  Up to 48 hours............<12.0 mg/dL  3-5 days..................<15.0 mg/dL  6-29 days.................<15.0 mg/dL        Albumin   Date Value Ref Range Status   07/01/2024 3.9 3.5 - 5.2 g/dL Final   06/27/2024 3.8 3.5 - 5.2 g/dL Final   06/24/2024 3.6 3.5 - 5.2 g/dL Final      Total Protein   Date Value Ref Range Status   07/01/2024 6.7 6.0 - 8.4 g/dL Final   06/27/2024 6.6 6.0 - 8.4 g/dL Final   06/24/2024 6.3 6.0 - 8.4 g/dL Final      Alkaline Phosphatase   Date Value Ref Range Status   07/01/2024 140 (H) 55 - 135 U/L Final   06/27/2024 131 55 - 135 U/L Final   06/24/2024 123 55 - 135 U/L Final        WBC   Date Value Ref Range Status   07/01/2024 1.78 (LL) 3.90 - 12.70 K/uL Final     Comment:     WBC critical result(s) called and verbal readback obtained from   Hunter Mcdonnell RN. by [facility] 07/01/2024 09:48     06/27/2024 1.50 (LL) 3.90 - 12.70 K/uL Final     Comment:     WBC critical result(s) called and verbal readback obtained from Charly Rider RN. by [facility] 06/27/2024 10:33     06/24/2024 1.88 (LL) 3.90 - 12.70 K/uL Final     Comment:     WBC critical result(s) called and verbal readback obtained from   Elvira Amador RN. by [facility] 06/24/2024 09:06        Hemoglobin   Date Value Ref Range Status   07/01/2024 7.6 (L) 14.0 - 18.0 g/dL Final   06/27/2024 8.0 (L) 14.0 - 18.0 g/dL Final   06/24/2024 8.4@RCBLD (L) 14.0 - 18.0 g/dL Final      Hematocrit   Date Value Ref Range Status   07/01/2024 23.0 (L) 40.0 - 54.0 % Final   06/27/2024 24.0 (L) 40.0 - 54.0 % Final   06/24/2024 25.3@RCBLD (L) 40.0 - 54.0 % Final      Platelets   Date Value Ref Range Status   07/01/2024 20 (LL) 150 - 450 K/uL Final     Comment:     PLT critical result(s) called and verbal readback obtained from   Hunter Mcdonnell RN. by [facility] 07/01/2024 10:12     06/27/2024 13 (LL) 150 - 450 K/uL Final     Comment:     PLT  critical result(s) called and verbal readback obtained from Charly Rider RN. by [facility] 06/27/2024 12:17     06/24/2024 27 (LL) 150 - 450 K/uL Final        OSH eval  H pylori EIA neg  CMV neg  Respiratory panel neg  Legionella urine Ag neg  Strep pneumo Ag neg  Mycoplasma pneumonia Ab neg    CT abd/pelvis 4/5/2024  The esophagus is severely thickened and hyperemic suggesting infectious or inflammatory esophagitis.     Small bilateral effusions and associated left basilar consolidative opacities could     EGD 4/8/2024  LA Grade B (one or more mucosal breaks greater than 5 mm, not extending        between the tops of two mucosal folds) esophagitis with no bleeding was        found in the lower third of the esophagus. The esophagitis is about a        few cm above possible herpetic esophagitis or CMV esophagitis can not be        excluded. Biopsies were taken with a cold forceps for histology.        Verification of patient identification for the specimen was done.        Estimated blood loss was minimal. This could have been the site of the        bleeding at the current time it has not bleeding.        Diffuse moderate inflammation characterized by congestion (edema) and        erythema was found in the gastric antrum. Biopsies were taken with a        cold forceps for histology. Verification of patient identification for        the specimen was done. Estimated blood loss was minimal. No blood was        noted in the stomach        The examined duodenum was normal.     Path 4/8/2024  Final Diagnosis    A.  Stomach, biopsy-  Chronic gastritis.  Negative for H pylori ([place]).  Negative for intestinal metaplasia.  Negative for dysplasia and malignancy.     B.  Distal esophagus, biopsy-  Squamous mucosa with focal acute inflammation and atypical cells.  See comment  No columnar mucosa present for evaluation.  Negative for intestinal metaplasia.  Negative for dysplasia and malignancy.     HPV RNA sidney panel:  No  high-grade or low-grade risk HPV subtypes were detected by in Situ hybridization.    Assessment:   68M with histpry of CAD, MDS on aza/nilda with plans for stem cell transplant presents with recurrent C difficile. Second episode has improved after course of vancomycin PO - will plan for taper, then continue on prophylactic dose.    - Results reviewed as above      Plan:   - Continue vancomycin 125 mg PO QID x2 weeks  - Then decrease to vancomycin 125 mg PO TID x1 week  - Then decrease to vancomycin 125 mg PO BID until transplant, through transplant, and after transplant  - Patient will inform if any increase in stool frequency or recurrence of diarrhea with tapering of vancomycin PO    - Will discuss with Angelina Hickey and Lobo about insomnia and neuropathy    - Plan discussed with Angelina Hickey and Lobo      40 minutes of total time spent on the encounter, which includes face to face time and non-face to face time preparing to see the patient (eg, review of tests), Obtaining and/or reviewing separately obtained history, Documenting clinical information in the electronic or other health record, Independently interpreting results (not separately reported) and communicating results to the patient/family/caregiver, or Care coordination (not separately reported).     This patient's visit complexity is inherent to evaluation and management associated with medical care services that are part of ongoing care related to this patient's single, serious condition or complex condition.       Manasa Samuel MD MPH  [facility] - Infectious Disease

## 2024-07-02 NOTE — PROGRESS NOTES
Subjective     Patient ID: Guillaume Salinas is a 68 y.o. male.    Chief Complaint:cdiff      History of Present Illness    {ID HPI BLOCKS:05028}    Review of Systems   Constitutional: Negative for chills, decreased appetite, fever, malaise/fatigue, night sweats, weight gain and weight loss.   HENT:  Negative for congestion, ear pain, hearing loss, hoarse voice, sore throat and tinnitus.    Eyes:  Negative for blurred vision, redness and visual disturbance.   Cardiovascular:  Negative for chest pain, leg swelling and palpitations.   Respiratory:  Negative for cough, hemoptysis, shortness of breath, sputum production and wheezing.    Hematologic/Lymphatic: Negative for adenopathy. Does not bruise/bleed easily.   Skin:  Negative for dry skin, itching, rash and suspicious lesions.   Musculoskeletal:  Positive for myalgias. Negative for back pain, joint pain and neck pain.   Gastrointestinal:  Positive for nausea. Negative for abdominal pain, constipation, diarrhea, heartburn and vomiting.   Genitourinary:  Negative for dysuria, flank pain, frequency, hematuria, hesitancy and urgency.   Neurological:  Positive for dizziness and weakness. Negative for headaches, numbness and paresthesias.   Psychiatric/Behavioral:  Negative for depression and memory loss. The patient has insomnia. The patient is not nervous/anxious.       Objective   Physical Exam       Assessment and Plan     1. Myelodysplastic syndrome    2. Stem cell transplant candidate    3. Clostridium difficile diarrhea        Plan:  ***

## 2024-07-02 NOTE — PLAN OF CARE
Vidaza given to left side abdominal tissue. Patient tolerated well. Next appt reviewed with patient. Patient ambulated out of clinic in [facility].

## 2024-07-03 ENCOUNTER — TELEPHONE (OUTPATIENT)
Dept: HEMATOLOGY/ONCOLOGY | Facility: CLINIC | Age: 69
End: 2024-07-03
Payer: MEDICARE

## 2024-07-03 ENCOUNTER — INFUSION (OUTPATIENT)
Dept: INFUSION THERAPY | Facility: HOSPITAL | Age: 69
End: 2024-07-03
Attending: INTERNAL MEDICINE
Payer: MEDICARE

## 2024-07-03 ENCOUNTER — PATIENT MESSAGE (OUTPATIENT)
Dept: HEMATOLOGY/ONCOLOGY | Facility: CLINIC | Age: 69
End: 2024-07-03
Payer: MEDICARE

## 2024-07-03 ENCOUNTER — LAB VISIT (OUTPATIENT)
Dept: LAB | Facility: HOSPITAL | Age: 69
End: 2024-07-03
Attending: INTERNAL MEDICINE
Payer: MEDICARE

## 2024-07-03 VITALS
HEIGHT: 68 IN | OXYGEN SATURATION: 97 % | SYSTOLIC BLOOD PRESSURE: 114 MMHG | DIASTOLIC BLOOD PRESSURE: 57 MMHG | BODY MASS INDEX: 24.66 KG/M2 | TEMPERATURE: 98 F | RESPIRATION RATE: 16 BRPM | WEIGHT: 162.69 LBS | HEART RATE: 82 BPM

## 2024-07-03 DIAGNOSIS — Z76.82 STEM CELL TRANSPLANT CANDIDATE: ICD-10-CM

## 2024-07-03 DIAGNOSIS — M79.2 NEUROPATHIC PAIN: ICD-10-CM

## 2024-07-03 DIAGNOSIS — D46.9 MYELODYSPLASTIC SYNDROME: ICD-10-CM

## 2024-07-03 DIAGNOSIS — D61.818 PANCYTOPENIA: ICD-10-CM

## 2024-07-03 DIAGNOSIS — D46.9 MYELODYSPLASTIC SYNDROME: Primary | ICD-10-CM

## 2024-07-03 LAB
ABO + RH BLD: NORMAL
ALBUMIN SERPL BCP-MCNC: 4 G/DL (ref 3.5–5.2)
ALP SERPL-CCNC: 143 U/L (ref 55–135)
ALT SERPL W/O P-5'-P-CCNC: 22 U/L (ref 10–44)
ANION GAP SERPL CALC-SCNC: 8 MMOL/L (ref 8–16)
ANISOCYTOSIS BLD QL SMEAR: SLIGHT
AST SERPL-CCNC: 18 U/L (ref 10–40)
BASOPHILS NFR BLD: 3 % (ref 0–1.9)
BILIRUB SERPL-MCNC: 0.4 MG/DL (ref 0.1–1)
BLD GP AB SCN CELLS X3 SERPL QL: NORMAL
BUN SERPL-MCNC: 14 MG/DL (ref 8–23)
CALCIUM SERPL-MCNC: 9.7 MG/DL (ref 8.7–10.5)
CHLORIDE SERPL-SCNC: 106 MMOL/L (ref 95–110)
CO2 SERPL-SCNC: 25 MMOL/L (ref 23–29)
CREAT SERPL-MCNC: 1 MG/DL (ref 0.5–1.4)
DIFFERENTIAL METHOD BLD: ABNORMAL
EOSINOPHIL NFR BLD: 1 % (ref 0–8)
ERYTHROCYTE [DISTWIDTH] IN BLOOD BY AUTOMATED COUNT: 14.6 % (ref 11.5–14.5)
EST. GFR  (NO RACE VARIABLE): >60 ML/MIN/1.73 M^2
GLUCOSE SERPL-MCNC: 128 MG/DL (ref 70–110)
HCT VFR BLD AUTO: 21.2 % (ref 40–54)
HGB BLD-MCNC: 7 G/DL (ref 14–18)
HYPOCHROMIA BLD QL SMEAR: ABNORMAL
IMM GRANULOCYTES # BLD AUTO: ABNORMAL K/UL (ref 0–0.04)
IMM GRANULOCYTES NFR BLD AUTO: ABNORMAL % (ref 0–0.5)
LYMPHOCYTES NFR BLD: 58 % (ref 18–48)
MAGNESIUM SERPL-MCNC: 2.3 MG/DL (ref 1.6–2.6)
MCH RBC QN AUTO: 30.3 PG (ref 27–31)
MCHC RBC AUTO-ENTMCNC: 33 G/DL (ref 32–36)
MCV RBC AUTO: 92 FL (ref 82–98)
MONOCYTES NFR BLD: 1 % (ref 4–15)
NEUTROPHILS NFR BLD: 37 % (ref 38–73)
NRBC BLD-RTO: 0 /100 WBC
PHOSPHATE SERPL-MCNC: 4 MG/DL (ref 2.7–4.5)
PLATELET # BLD AUTO: 10 K/UL (ref 150–450)
PLATELET BLD QL SMEAR: ABNORMAL
PMV BLD AUTO: 10.8 FL (ref 9.2–12.9)
POTASSIUM SERPL-SCNC: 4.4 MMOL/L (ref 3.5–5.1)
PROT SERPL-MCNC: 7 G/DL (ref 6–8.4)
RBC # BLD AUTO: 2.31 M/UL (ref 4.6–6.2)
SODIUM SERPL-SCNC: 139 MMOL/L (ref 136–145)
SPECIMEN OUTDATE: NORMAL
WBC # BLD AUTO: 1.35 K/UL (ref 3.9–12.7)

## 2024-07-03 PROCEDURE — 83735 ASSAY OF MAGNESIUM: CPT | Performed by: INTERNAL MEDICINE

## 2024-07-03 PROCEDURE — 85027 COMPLETE CBC AUTOMATED: CPT | Performed by: INTERNAL MEDICINE

## 2024-07-03 PROCEDURE — 63600175 PHARM REV CODE 636 W HCPCS: Performed by: INTERNAL MEDICINE

## 2024-07-03 PROCEDURE — P9040 RBC LEUKOREDUCED IRRADIATED: HCPCS

## 2024-07-03 PROCEDURE — 25000003 PHARM REV CODE 250: Performed by: INTERNAL MEDICINE

## 2024-07-03 PROCEDURE — 86900 BLOOD TYPING SEROLOGIC ABO: CPT | Performed by: INTERNAL MEDICINE

## 2024-07-03 PROCEDURE — 80053 COMPREHEN METABOLIC PANEL: CPT | Performed by: INTERNAL MEDICINE

## 2024-07-03 PROCEDURE — 36415 COLL VENOUS BLD VENIPUNCTURE: CPT | Performed by: INTERNAL MEDICINE

## 2024-07-03 PROCEDURE — 84100 ASSAY OF PHOSPHORUS: CPT | Performed by: INTERNAL MEDICINE

## 2024-07-03 PROCEDURE — 96401 CHEMO ANTI-NEOPL SQ/IM: CPT

## 2024-07-03 PROCEDURE — 86901 BLOOD TYPING SEROLOGIC RH(D): CPT | Performed by: INTERNAL MEDICINE

## 2024-07-03 PROCEDURE — 86920 COMPATIBILITY TEST SPIN: CPT

## 2024-07-03 PROCEDURE — P9037 PLATE PHERES LEUKOREDU IRRAD: HCPCS

## 2024-07-03 PROCEDURE — 85007 BL SMEAR W/DIFF WBC COUNT: CPT | Performed by: INTERNAL MEDICINE

## 2024-07-03 RX ORDER — ONDANSETRON HYDROCHLORIDE 8 MG/1
16 TABLET, FILM COATED ORAL
Status: COMPLETED | OUTPATIENT
Start: 2024-07-03 | End: 2024-07-03

## 2024-07-03 RX ORDER — AZACITIDINE 100 MG/1
75 INJECTION, POWDER, LYOPHILIZED, FOR SOLUTION INTRAVENOUS; SUBCUTANEOUS
Status: COMPLETED | OUTPATIENT
Start: 2024-07-03 | End: 2024-07-03

## 2024-07-03 RX ORDER — HYDROCODONE BITARTRATE AND ACETAMINOPHEN 500; 5 MG/1; MG/1
TABLET ORAL ONCE
Status: CANCELLED | OUTPATIENT
Start: 2024-07-03 | End: 2024-07-03

## 2024-07-03 RX ORDER — ACETAMINOPHEN 325 MG/1
650 TABLET ORAL
Status: CANCELLED | OUTPATIENT
Start: 2024-07-03

## 2024-07-03 RX ORDER — GABAPENTIN 300 MG/1
600 CAPSULE ORAL 3 TIMES DAILY
Qty: 180 CAPSULE | Refills: 11 | Status: SHIPPED | OUTPATIENT
Start: 2024-07-03

## 2024-07-03 RX ADMIN — AZACITIDINE 140 MG: 100 INJECTION, POWDER, LYOPHILIZED, FOR SOLUTION INTRAVENOUS; SUBCUTANEOUS at 08:07

## 2024-07-03 RX ADMIN — ONDANSETRON HYDROCHLORIDE 16 MG: 8 TABLET, FILM COATED ORAL at 08:07

## 2024-07-03 NOTE — PLAN OF CARE
Patient tolerated Vidaza injection to right lower abdominal tissue well. No s/s adverse reaction. Next appt reviewed with patient and patient ambulated out of clinic in [facility].

## 2024-07-05 ENCOUNTER — INFUSION (OUTPATIENT)
Dept: INFUSION THERAPY | Facility: HOSPITAL | Age: 69
End: 2024-07-05
Attending: INTERNAL MEDICINE
Payer: MEDICARE

## 2024-07-05 VITALS
TEMPERATURE: 99 F | HEART RATE: 62 BPM | DIASTOLIC BLOOD PRESSURE: 67 MMHG | RESPIRATION RATE: 16 BRPM | SYSTOLIC BLOOD PRESSURE: 138 MMHG | OXYGEN SATURATION: 100 %

## 2024-07-05 DIAGNOSIS — D46.9 MYELODYSPLASTIC SYNDROME: ICD-10-CM

## 2024-07-05 DIAGNOSIS — D61.818 PANCYTOPENIA: ICD-10-CM

## 2024-07-05 DIAGNOSIS — D46.9 MYELODYSPLASTIC SYNDROME: Primary | ICD-10-CM

## 2024-07-05 LAB
BLD PROD TYP BPU: NORMAL
BLOOD UNIT EXPIRATION DATE: NORMAL
BLOOD UNIT TYPE CODE: 5100
BLOOD UNIT TYPE CODE: 5100
BLOOD UNIT TYPE CODE: 7300
BLOOD UNIT TYPE: NORMAL
CODING SYSTEM: NORMAL
CROSSMATCH INTERPRETATION: NORMAL
DISPENSE STATUS: NORMAL
NUM UNITS TRANS PACKED RBC: NORMAL
NUM UNITS TRANS PACKED RBC: NORMAL
UNIT NUMBER: NORMAL

## 2024-07-05 PROCEDURE — 36430 TRANSFUSION BLD/BLD COMPNT: CPT

## 2024-07-05 PROCEDURE — 25000003 PHARM REV CODE 250

## 2024-07-05 PROCEDURE — 63600175 PHARM REV CODE 636 W HCPCS: Performed by: INTERNAL MEDICINE

## 2024-07-05 PROCEDURE — 25000003 PHARM REV CODE 250: Performed by: INTERNAL MEDICINE

## 2024-07-05 RX ORDER — ACETAMINOPHEN 325 MG/1
650 TABLET ORAL
Status: DISCONTINUED | OUTPATIENT
Start: 2024-07-05 | End: 2024-07-05 | Stop reason: HOSPADM

## 2024-07-05 RX ORDER — HYDROCODONE BITARTRATE AND ACETAMINOPHEN 500; 5 MG/1; MG/1
TABLET ORAL ONCE
Status: COMPLETED | OUTPATIENT
Start: 2024-07-05 | End: 2024-07-05

## 2024-07-05 RX ORDER — ONDANSETRON HYDROCHLORIDE 8 MG/1
16 TABLET, FILM COATED ORAL
Status: COMPLETED | OUTPATIENT
Start: 2024-07-05 | End: 2024-07-05

## 2024-07-05 RX ORDER — HYDROCODONE BITARTRATE AND ACETAMINOPHEN 500; 5 MG/1; MG/1
TABLET ORAL ONCE
Status: DISCONTINUED | OUTPATIENT
Start: 2024-07-05 | End: 2024-07-05 | Stop reason: HOSPADM

## 2024-07-05 RX ORDER — AZACITIDINE 100 MG/1
75 INJECTION, POWDER, LYOPHILIZED, FOR SOLUTION INTRAVENOUS; SUBCUTANEOUS
Status: COMPLETED | OUTPATIENT
Start: 2024-07-05 | End: 2024-07-05

## 2024-07-05 RX ADMIN — ONDANSETRON HYDROCHLORIDE 16 MG: 8 TABLET, FILM COATED ORAL at 09:07

## 2024-07-05 RX ADMIN — AZACITIDINE 140 MG: 100 INJECTION, POWDER, LYOPHILIZED, FOR SOLUTION INTRAVENOUS; SUBCUTANEOUS at 09:07

## 2024-07-05 RX ADMIN — SODIUM CHLORIDE: 9 INJECTION, SOLUTION INTRAVENOUS at 08:07

## 2024-07-05 NOTE — NURSING
1120am=  PRBC transfusion completed.  No allergic reaction or distress noted.  NS flush started. VSS.  Voices no complaints at present.      1130am=  Platelets transfusion started.  VSS.  No allergic reaction or distress noted.  Voices no complaints at present.     1155am=  Platelets transfusion completed.  VSS.  No allergic reaction or distress noted.  Voices no complaints at present.  Instructed to stay for 15 min to monitor.     1210pm=  Tolerated blood and platelets transfusion well.  No allergic reaction or distress noted.  Ambulated and accompanied by family.

## 2024-07-05 NOTE — NURSING
0838am=  1 unit PRBC transfusion started at 100ml/hr,via pump.  No allergic reaction or distress noted.

## 2024-07-08 ENCOUNTER — LAB VISIT (OUTPATIENT)
Dept: LAB | Facility: HOSPITAL | Age: 69
End: 2024-07-08
Attending: INTERNAL MEDICINE
Payer: MEDICARE

## 2024-07-08 ENCOUNTER — INFUSION (OUTPATIENT)
Dept: INFUSION THERAPY | Facility: HOSPITAL | Age: 69
End: 2024-07-08
Attending: INTERNAL MEDICINE
Payer: MEDICARE

## 2024-07-08 ENCOUNTER — TELEPHONE (OUTPATIENT)
Dept: HEMATOLOGY/ONCOLOGY | Facility: CLINIC | Age: 69
End: 2024-07-08
Payer: MEDICARE

## 2024-07-08 VITALS
TEMPERATURE: 99 F | OXYGEN SATURATION: 98 % | WEIGHT: 162.69 LBS | BODY MASS INDEX: 24.74 KG/M2 | DIASTOLIC BLOOD PRESSURE: 56 MMHG | RESPIRATION RATE: 16 BRPM | SYSTOLIC BLOOD PRESSURE: 101 MMHG | HEART RATE: 75 BPM

## 2024-07-08 DIAGNOSIS — D46.9 MYELODYSPLASTIC SYNDROME: ICD-10-CM

## 2024-07-08 DIAGNOSIS — D69.6 THROMBOCYTOPENIA: ICD-10-CM

## 2024-07-08 DIAGNOSIS — D46.9 MYELODYSPLASTIC SYNDROME: Primary | ICD-10-CM

## 2024-07-08 DIAGNOSIS — Z76.82 STEM CELL TRANSPLANT CANDIDATE: ICD-10-CM

## 2024-07-08 DIAGNOSIS — D69.6 THROMBOCYTOPENIA: Primary | ICD-10-CM

## 2024-07-08 LAB
ABO + RH BLD: NORMAL
ALBUMIN SERPL BCP-MCNC: 4 G/DL (ref 3.5–5.2)
ALP SERPL-CCNC: 153 U/L (ref 55–135)
ALT SERPL W/O P-5'-P-CCNC: 19 U/L (ref 10–44)
ANION GAP SERPL CALC-SCNC: 10 MMOL/L (ref 8–16)
ANISOCYTOSIS BLD QL SMEAR: SLIGHT
AST SERPL-CCNC: 16 U/L (ref 10–40)
BASOPHILS NFR BLD: 0 % (ref 0–1.9)
BILIRUB SERPL-MCNC: 0.4 MG/DL (ref 0.1–1)
BLD GP AB SCN CELLS X3 SERPL QL: NORMAL
BUN SERPL-MCNC: 17 MG/DL (ref 8–23)
CALCIUM SERPL-MCNC: 9.5 MG/DL (ref 8.7–10.5)
CHLORIDE SERPL-SCNC: 108 MMOL/L (ref 95–110)
CO2 SERPL-SCNC: 26 MMOL/L (ref 23–29)
CREAT SERPL-MCNC: 0.9 MG/DL (ref 0.5–1.4)
DIFFERENTIAL METHOD BLD: ABNORMAL
EOSINOPHIL NFR BLD: 7 % (ref 0–8)
ERYTHROCYTE [DISTWIDTH] IN BLOOD BY AUTOMATED COUNT: 14 % (ref 11.5–14.5)
EST. GFR  (NO RACE VARIABLE): >60 ML/MIN/1.73 M^2
GLUCOSE SERPL-MCNC: 103 MG/DL (ref 70–110)
HCT VFR BLD AUTO: 23.2 % (ref 40–54)
HGB BLD-MCNC: 7.8 G/DL (ref 14–18)
HYPOCHROMIA BLD QL SMEAR: ABNORMAL
IMM GRANULOCYTES # BLD AUTO: ABNORMAL K/UL (ref 0–0.04)
IMM GRANULOCYTES NFR BLD AUTO: ABNORMAL % (ref 0–0.5)
LYMPHOCYTES NFR BLD: 74 % (ref 18–48)
MAGNESIUM SERPL-MCNC: 2.1 MG/DL (ref 1.6–2.6)
MCH RBC QN AUTO: 30.1 PG (ref 27–31)
MCHC RBC AUTO-ENTMCNC: 33.6 G/DL (ref 32–36)
MCV RBC AUTO: 90 FL (ref 82–98)
MONOCYTES NFR BLD: 0 % (ref 4–15)
NEUTROPHILS NFR BLD: 19 % (ref 38–73)
NRBC BLD-RTO: 0 /100 WBC
PHOSPHATE SERPL-MCNC: 3.7 MG/DL (ref 2.7–4.5)
PLATELET # BLD AUTO: 15 K/UL (ref 150–450)
PLATELET BLD QL SMEAR: ABNORMAL
PMV BLD AUTO: 11.8 FL (ref 9.2–12.9)
POTASSIUM SERPL-SCNC: 4 MMOL/L (ref 3.5–5.1)
PROT SERPL-MCNC: 7 G/DL (ref 6–8.4)
RBC # BLD AUTO: 2.59 M/UL (ref 4.6–6.2)
SODIUM SERPL-SCNC: 144 MMOL/L (ref 136–145)
SPECIMEN OUTDATE: NORMAL
WBC # BLD AUTO: 1.35 K/UL (ref 3.9–12.7)

## 2024-07-08 PROCEDURE — 85027 COMPLETE CBC AUTOMATED: CPT | Performed by: INTERNAL MEDICINE

## 2024-07-08 PROCEDURE — 63600175 PHARM REV CODE 636 W HCPCS: Performed by: INTERNAL MEDICINE

## 2024-07-08 PROCEDURE — 96401 CHEMO ANTI-NEOPL SQ/IM: CPT

## 2024-07-08 PROCEDURE — 84100 ASSAY OF PHOSPHORUS: CPT | Performed by: INTERNAL MEDICINE

## 2024-07-08 PROCEDURE — 86900 BLOOD TYPING SEROLOGIC ABO: CPT | Performed by: INTERNAL MEDICINE

## 2024-07-08 PROCEDURE — 85007 BL SMEAR W/DIFF WBC COUNT: CPT | Performed by: INTERNAL MEDICINE

## 2024-07-08 PROCEDURE — P9037 PLATE PHERES LEUKOREDU IRRAD: HCPCS | Performed by: INTERNAL MEDICINE

## 2024-07-08 PROCEDURE — 86850 RBC ANTIBODY SCREEN: CPT | Performed by: INTERNAL MEDICINE

## 2024-07-08 PROCEDURE — 80053 COMPREHEN METABOLIC PANEL: CPT | Performed by: INTERNAL MEDICINE

## 2024-07-08 PROCEDURE — 83735 ASSAY OF MAGNESIUM: CPT | Performed by: INTERNAL MEDICINE

## 2024-07-08 PROCEDURE — 25000003 PHARM REV CODE 250: Performed by: INTERNAL MEDICINE

## 2024-07-08 RX ORDER — HYDROCODONE BITARTRATE AND ACETAMINOPHEN 500; 5 MG/1; MG/1
TABLET ORAL ONCE
Status: CANCELLED | OUTPATIENT
Start: 2024-07-08 | End: 2024-07-08

## 2024-07-08 RX ORDER — AZACITIDINE 100 MG/1
75 INJECTION, POWDER, LYOPHILIZED, FOR SOLUTION INTRAVENOUS; SUBCUTANEOUS
Status: COMPLETED | OUTPATIENT
Start: 2024-07-08 | End: 2024-07-08

## 2024-07-08 RX ORDER — HYDROCODONE BITARTRATE AND ACETAMINOPHEN 500; 5 MG/1; MG/1
TABLET ORAL ONCE
OUTPATIENT
Start: 2024-07-08 | End: 2024-07-08

## 2024-07-08 RX ORDER — ONDANSETRON HYDROCHLORIDE 8 MG/1
16 TABLET, FILM COATED ORAL
Status: COMPLETED | OUTPATIENT
Start: 2024-07-08 | End: 2024-07-08

## 2024-07-08 RX ORDER — DIPHENHYDRAMINE HCL 25 MG
25 CAPSULE ORAL
OUTPATIENT
Start: 2024-07-08

## 2024-07-08 RX ORDER — ACETAMINOPHEN 325 MG/1
650 TABLET ORAL
Status: CANCELLED | OUTPATIENT
Start: 2024-07-08

## 2024-07-08 RX ORDER — ACETAMINOPHEN 325 MG/1
650 TABLET ORAL
OUTPATIENT
Start: 2024-07-08

## 2024-07-08 RX ADMIN — AZACITIDINE 140 MG: 100 INJECTION, POWDER, LYOPHILIZED, FOR SOLUTION INTRAVENOUS; SUBCUTANEOUS at 12:07

## 2024-07-08 RX ADMIN — ONDANSETRON HYDROCHLORIDE 16 MG: 8 TABLET, FILM COATED ORAL at 11:07

## 2024-07-08 NOTE — PLAN OF CARE
Patient with significant bruising to abdominal area where previous injections given. Dr. Diamond made aware. Patient to receive another unit of platelets tomorrow, ok to give injections today. Patient tolerated well. Next appt reviewed with patient. VSS. Patient ambulated out of clinic in [facility].

## 2024-07-09 ENCOUNTER — INFUSION (OUTPATIENT)
Dept: INFUSION THERAPY | Facility: HOSPITAL | Age: 69
End: 2024-07-09
Attending: INTERNAL MEDICINE
Payer: MEDICARE

## 2024-07-09 VITALS
OXYGEN SATURATION: 99 % | DIASTOLIC BLOOD PRESSURE: 57 MMHG | RESPIRATION RATE: 17 BRPM | HEART RATE: 78 BPM | TEMPERATURE: 98 F | SYSTOLIC BLOOD PRESSURE: 95 MMHG

## 2024-07-09 DIAGNOSIS — D69.6 THROMBOCYTOPENIA: ICD-10-CM

## 2024-07-09 PROCEDURE — 25000003 PHARM REV CODE 250: Performed by: INTERNAL MEDICINE

## 2024-07-09 PROCEDURE — 36430 TRANSFUSION BLD/BLD COMPNT: CPT

## 2024-07-09 RX ORDER — ACETAMINOPHEN 325 MG/1
650 TABLET ORAL
Status: DISCONTINUED | OUTPATIENT
Start: 2024-07-09 | End: 2024-07-09 | Stop reason: HOSPADM

## 2024-07-09 RX ORDER — HYDROCODONE BITARTRATE AND ACETAMINOPHEN 500; 5 MG/1; MG/1
TABLET ORAL ONCE
Status: COMPLETED | OUTPATIENT
Start: 2024-07-09 | End: 2024-07-09

## 2024-07-09 RX ADMIN — SODIUM CHLORIDE: 9 INJECTION, SOLUTION INTRAVENOUS at 01:07

## 2024-07-09 NOTE — PLAN OF CARE
Pt tolerated 1 unit platelet transfusion well.  VSS. No adverse reaction noted. PIV flushed with NS and D/C per protocol.  Patient left clinic in no acute distress.

## 2024-07-11 ENCOUNTER — DOCUMENTATION ONLY (OUTPATIENT)
Dept: HEMATOLOGY/ONCOLOGY | Facility: CLINIC | Age: 69
End: 2024-07-11
Payer: MEDICARE

## 2024-07-11 ENCOUNTER — INFUSION (OUTPATIENT)
Dept: INFUSION THERAPY | Facility: HOSPITAL | Age: 69
End: 2024-07-11
Attending: INTERNAL MEDICINE
Payer: MEDICARE

## 2024-07-11 ENCOUNTER — TELEPHONE (OUTPATIENT)
Dept: HEMATOLOGY/ONCOLOGY | Facility: CLINIC | Age: 69
End: 2024-07-11
Payer: MEDICARE

## 2024-07-11 ENCOUNTER — LAB VISIT (OUTPATIENT)
Dept: LAB | Facility: HOSPITAL | Age: 69
End: 2024-07-11
Attending: INTERNAL MEDICINE
Payer: MEDICARE

## 2024-07-11 ENCOUNTER — PATIENT MESSAGE (OUTPATIENT)
Dept: INFECTIOUS DISEASES | Facility: CLINIC | Age: 69
End: 2024-07-11
Payer: MEDICARE

## 2024-07-11 ENCOUNTER — CLINICAL SUPPORT (OUTPATIENT)
Dept: REHABILITATION | Facility: HOSPITAL | Age: 69
End: 2024-07-11
Attending: INTERNAL MEDICINE
Payer: MEDICARE

## 2024-07-11 ENCOUNTER — TELEPHONE (OUTPATIENT)
Dept: GASTROENTEROLOGY | Facility: HOSPITAL | Age: 69
End: 2024-07-11
Payer: MEDICARE

## 2024-07-11 VITALS
HEART RATE: 76 BPM | BODY MASS INDEX: 24.66 KG/M2 | OXYGEN SATURATION: 98 % | DIASTOLIC BLOOD PRESSURE: 59 MMHG | TEMPERATURE: 99 F | RESPIRATION RATE: 18 BRPM | HEIGHT: 68 IN | WEIGHT: 162.69 LBS | SYSTOLIC BLOOD PRESSURE: 113 MMHG

## 2024-07-11 DIAGNOSIS — D64.9 SYMPTOMATIC ANEMIA: Primary | ICD-10-CM

## 2024-07-11 DIAGNOSIS — Z76.82 STEM CELL TRANSPLANT CANDIDATE: ICD-10-CM

## 2024-07-11 DIAGNOSIS — R53.81 PHYSICAL DECONDITIONING: ICD-10-CM

## 2024-07-11 DIAGNOSIS — D46.9 MYELODYSPLASTIC SYNDROME: ICD-10-CM

## 2024-07-11 DIAGNOSIS — R68.89 DECREASED ACTIVITY TOLERANCE: Primary | ICD-10-CM

## 2024-07-11 DIAGNOSIS — D64.9 SYMPTOMATIC ANEMIA: ICD-10-CM

## 2024-07-11 LAB
ABO + RH BLD: NORMAL
ALBUMIN SERPL BCP-MCNC: 3.7 G/DL (ref 3.5–5.2)
ALP SERPL-CCNC: 141 U/L (ref 55–135)
ALT SERPL W/O P-5'-P-CCNC: 17 U/L (ref 10–44)
ANION GAP SERPL CALC-SCNC: 7 MMOL/L (ref 8–16)
ANISOCYTOSIS BLD QL SMEAR: SLIGHT
AST SERPL-CCNC: 14 U/L (ref 10–40)
BASOPHILS # BLD AUTO: 0 K/UL (ref 0–0.2)
BASOPHILS NFR BLD: 0 % (ref 0–1.9)
BILIRUB SERPL-MCNC: 0.2 MG/DL (ref 0.1–1)
BLD GP AB SCN CELLS X3 SERPL QL: NORMAL
BLD PROD TYP BPU: NORMAL
BLOOD UNIT EXPIRATION DATE: NORMAL
BLOOD UNIT TYPE CODE: 5100
BLOOD UNIT TYPE: NORMAL
BUN SERPL-MCNC: 13 MG/DL (ref 8–23)
CALCIUM SERPL-MCNC: 9.2 MG/DL (ref 8.7–10.5)
CHLORIDE SERPL-SCNC: 108 MMOL/L (ref 95–110)
CO2 SERPL-SCNC: 26 MMOL/L (ref 23–29)
CODING SYSTEM: NORMAL
CREAT SERPL-MCNC: 0.9 MG/DL (ref 0.5–1.4)
CROSSMATCH INTERPRETATION: NORMAL
DIFFERENTIAL METHOD BLD: ABNORMAL
DISPENSE STATUS: NORMAL
EOSINOPHIL # BLD AUTO: 0 K/UL (ref 0–0.5)
EOSINOPHIL NFR BLD: 2 % (ref 0–8)
ERYTHROCYTE [DISTWIDTH] IN BLOOD BY AUTOMATED COUNT: 13.6 % (ref 11.5–14.5)
EST. GFR  (NO RACE VARIABLE): >60 ML/MIN/1.73 M^2
GLUCOSE SERPL-MCNC: 156 MG/DL (ref 70–110)
HCT VFR BLD AUTO: 19.8 % (ref 40–54)
HGB BLD-MCNC: 6.8 G/DL (ref 14–18)
HYPOCHROMIA BLD QL SMEAR: ABNORMAL
IMM GRANULOCYTES # BLD AUTO: 0.01 K/UL (ref 0–0.04)
IMM GRANULOCYTES NFR BLD AUTO: 0.7 % (ref 0–0.5)
LYMPHOCYTES # BLD AUTO: 0.9 K/UL (ref 1–4.8)
LYMPHOCYTES NFR BLD: 61.2 % (ref 18–48)
MAGNESIUM SERPL-MCNC: 2.2 MG/DL (ref 1.6–2.6)
MCH RBC QN AUTO: 30.8 PG (ref 27–31)
MCHC RBC AUTO-ENTMCNC: 34.3 G/DL (ref 32–36)
MCV RBC AUTO: 90 FL (ref 82–98)
MONOCYTES # BLD AUTO: 0 K/UL (ref 0.3–1)
MONOCYTES NFR BLD: 2.7 % (ref 4–15)
NEUTROPHILS # BLD AUTO: 0.5 K/UL (ref 1.8–7.7)
NEUTROPHILS NFR BLD: 33.4 % (ref 38–73)
NRBC BLD-RTO: 0 /100 WBC
NUM UNITS TRANS PACKED RBC: NORMAL
PHOSPHATE SERPL-MCNC: 3.6 MG/DL (ref 2.7–4.5)
PLATELET # BLD AUTO: 24 K/UL (ref 150–450)
PLATELET BLD QL SMEAR: ABNORMAL
PMV BLD AUTO: 10 FL (ref 9.2–12.9)
POTASSIUM SERPL-SCNC: 4.3 MMOL/L (ref 3.5–5.1)
PROT SERPL-MCNC: 6.7 G/DL (ref 6–8.4)
RBC # BLD AUTO: 2.21 M/UL (ref 4.6–6.2)
SODIUM SERPL-SCNC: 141 MMOL/L (ref 136–145)
SPECIMEN OUTDATE: NORMAL
WBC # BLD AUTO: 1.47 K/UL (ref 3.9–12.7)

## 2024-07-11 PROCEDURE — 84100 ASSAY OF PHOSPHORUS: CPT | Performed by: INTERNAL MEDICINE

## 2024-07-11 PROCEDURE — 83735 ASSAY OF MAGNESIUM: CPT | Performed by: INTERNAL MEDICINE

## 2024-07-11 PROCEDURE — 36430 TRANSFUSION BLD/BLD COMPNT: CPT

## 2024-07-11 PROCEDURE — 97162 PT EVAL MOD COMPLEX 30 MIN: CPT | Mod: PN

## 2024-07-11 PROCEDURE — P9040 RBC LEUKOREDUCED IRRADIATED: HCPCS | Performed by: INTERNAL MEDICINE

## 2024-07-11 PROCEDURE — 86920 COMPATIBILITY TEST SPIN: CPT | Performed by: INTERNAL MEDICINE

## 2024-07-11 PROCEDURE — 25000003 PHARM REV CODE 250: Performed by: INTERNAL MEDICINE

## 2024-07-11 PROCEDURE — 85025 COMPLETE CBC W/AUTO DIFF WBC: CPT | Performed by: INTERNAL MEDICINE

## 2024-07-11 PROCEDURE — 80053 COMPREHEN METABOLIC PANEL: CPT | Performed by: INTERNAL MEDICINE

## 2024-07-11 PROCEDURE — 86850 RBC ANTIBODY SCREEN: CPT | Performed by: INTERNAL MEDICINE

## 2024-07-11 RX ORDER — HYDROCODONE BITARTRATE AND ACETAMINOPHEN 500; 5 MG/1; MG/1
TABLET ORAL ONCE
Status: COMPLETED | OUTPATIENT
Start: 2024-07-11 | End: 2024-07-11

## 2024-07-11 RX ORDER — DIPHENHYDRAMINE HCL 25 MG
25 CAPSULE ORAL
Status: CANCELLED | OUTPATIENT
Start: 2024-07-11

## 2024-07-11 RX ORDER — ACETAMINOPHEN 325 MG/1
650 TABLET ORAL
Status: DISCONTINUED | OUTPATIENT
Start: 2024-07-11 | End: 2024-07-11 | Stop reason: HOSPADM

## 2024-07-11 RX ORDER — DIPHENHYDRAMINE HCL 25 MG
25 CAPSULE ORAL
Status: DISCONTINUED | OUTPATIENT
Start: 2024-07-11 | End: 2024-07-11 | Stop reason: HOSPADM

## 2024-07-11 RX ORDER — HYDROCODONE BITARTRATE AND ACETAMINOPHEN 500; 5 MG/1; MG/1
TABLET ORAL ONCE
OUTPATIENT
Start: 2024-07-12 | End: 2024-07-12

## 2024-07-11 RX ORDER — ACETAMINOPHEN 325 MG/1
650 TABLET ORAL
OUTPATIENT
Start: 2024-07-12

## 2024-07-11 RX ORDER — ACETAMINOPHEN 325 MG/1
650 TABLET ORAL
Status: CANCELLED | OUTPATIENT
Start: 2024-07-11

## 2024-07-11 RX ORDER — HYDROCODONE BITARTRATE AND ACETAMINOPHEN 500; 5 MG/1; MG/1
TABLET ORAL ONCE
Status: CANCELLED | OUTPATIENT
Start: 2024-07-11 | End: 2024-07-11

## 2024-07-11 RX ADMIN — SODIUM CHLORIDE: 9 INJECTION, SOLUTION INTRAVENOUS at 12:07

## 2024-07-11 NOTE — PLAN OF CARE
Pt received 1 unit PRBC through PIV without difficulties. Blood consent in chart. S/S of transfusion reaction discussed with patient. Pt verbalized understanding.  Vital signs remained stable, pt tolerated it well. AVS given. Pt will follow up with MD. Discharged with no acute distress.

## 2024-07-11 NOTE — TELEPHONE ENCOUNTER
Spoke with patients daughter (Susy) regarding vancomycin refill. She reports it has been filled by Dr. Samuel and he has received it.

## 2024-07-11 NOTE — PLAN OF CARE
"[facility]  Physical Therapy Neurological Rehabilitation Initial Evaluation     Name: Guillaume Salinas  Clinic Number: [id number]    Therapy Diagnosis:   Encounter Diagnoses   Name Primary?    Myelodysplastic syndrome     Stem cell transplant candidate     Physical deconditioning     Decreased activity tolerance Yes     Physician: Mohit Hickey MD    Physician Orders: PT Eval and Treat   Medical Diagnosis from Referral: Myelodysplastic syndrome [D46.9], Stem cell transplant candidate [Z76.82], Physical deconditioning [R53.81]   Evaluation Date: 7/11/2024  Authorization Period Expiration: 6/21/2025  Plan of Care Expiration: 8/23/2024  Progress Note Due: 8/11/2024  Date of Surgery: N/A  Visit # / Visits authorized: 1/1  FOTO: 1/3    Precautions: Standard and cancerAMN  utilized throughout visit    Time In: 3:25PM  Time Out: 4:00PM  Total Billable Time: 35 minutes (1 mod eval)    Subjective      Date of onset: >1 year    History of current condition - Guillaume reports: "I have leukemia and I'm getting treatment since one year and two months ago." Patient reports he is generally weaker than he used to be. His goals are to improve activity tolerance so he can help out around the house more and return to things like gardening.      Imaging  - CT Chest (5/15/2024): 1. No acute/emergent intrathoracic findings appreciated.  2. 0.2-cm ground-glass nodule left upper lobe and, 0.5-cm groundglass nodule in the right lower lobe (4:201), not significantly changed.  3. Symmetric minimal paraseptal emphysematous changes with apical predominance, not significantly changed.    Prior Therapy: at this facility for orthopedic occupational therapy in the past   Social History: lives with wife  Falls: denies    DME: denies  Home Environment: single story home   Exercise Routine / History: walks inside home   Family Present at time of Eval: wife present for paperwork   Occupation: " "retired for 3 years  Prior Level of Function: independent   Current Level of Function: independent but unable to help his wife with chores around the house as often as he used to    Pain:  Current 0/10, worst 0/10, best 0/10   Location: N/A  Description: N/A  Aggravating Factors: N/A  Easing Factors: N/A     Patient's goals: "I want to be able to continue to do my exercise and my gardening" "I don't want to leave everything for my wife"    Medical History:   Past Medical History:   Diagnosis Date    Anticoagulant long-term use     Coronary artery disease     Hypertension     Myelodysplastic syndrome     Peripheral vascular disease, unspecified      Surgical History:   Guillaume Salinas  has a past surgical history that includes Colonoscopy (N/A, 2022) and Bone marrow biopsy (Left, 2023).    Medications:   Guillaume has a current medication list which includes the following prescription(s): acyclovir, carvedilol, gabapentin, letermovir, levofloxacin, ondansetron, pantoprazole, [START ON 2024] posaconazole, promethazine, vancomycin, vancomycin, venetoclax, voriconazole, and zolpidem.    Allergies:   Review of patient's allergies indicates:  No Known Allergies     Objective      - Command followin% simple and complex   - Speech: no deficits     Mental status: alert, oriented to person, place, and time, normal mood, behavior, speech, dress, motor activity, and thought processes  Behavior:  calm and cooperative  Attention Span and Concentration:  Normal    Sensation:  denies any changes in sensation since initiation of chemotherapy    Tone: 0 - No increase in muscle tone  Limbs/muscles affected: major muscle groups of bilateral lower extremity    Visual/Auditory: denies changes     Coordination:   - fine motor: not tested  - UE coordination: not tested    - LE coordination:  intact rapid alternating movement    ROM:   UPPER EXTREMITY--AROM/PROM  (R) UE: WFLs  (L) UE: WFLs         RANGE OF " MOTION--LOWER EXTREMITIES  (R) LE WFLs  (L) LE: WFLs    Upper Extremity Strength   RUE LUE   Shoulder Flexion: 5/5 5/5   Shoulder Abduction: 5/5 5/5   Elbow Flexion:     5/5 5/5   Elbow Extension: 5/5 5/5   Wrist Extension: 5/5 5/5     Lower Extremity Strength   RLE LLE   Hip Flexion: 5/5 4+/5   Hip Abduction: 4+/5 4+/5   Knee Extension: 5/5 5/5   Knee Flexion: 5/5 5/5   Ankle Dorsiflexion: 4+/5 4+/5   Ankle Inversion: 5/5 5/5   Ankle Eversion: 5/5 5/5     30-Second Sit to Stand: 11 repetitions without bilateral upper extremity assist    Six-Minute Walk Test: 1252 feet (Two-Minute Walk Test: 405 feet)   Vitals Pre: O2 = 99% / HR = 76bpm   Virals Post: O2 = 99% / HR = 96bpm    Intake Outcome Measure for FOTO NOC-Neuromuscular disorder Survey    Therapist reviewed FOTO scores for Guillaume Salinas on 7/11/2024.   FOTO report - see Media section or FOTO account episode details.    Intake Score: 46%       Treatment     Treatment not initiated today; suggestions for follow up = treadmill; circuit training; functional endurance program     Patient Education and Home Exercises     Education provided:   - PT plan of care  - Role of PT    Written Home Exercises Provided: Not today; provide at follow up  Assessment     Guillaume is a 68 y.o. male referred to outpatient Physical Therapy with a medical diagnosis of Myelodysplastic syndrome [D46.9], Stem cell transplant candidate [Z76.82], Physical deconditioning [R53.81]. Patient presents with decreased activity tolerance; impaired cardiovascular and muscular endurance; and decreased self-perception of current functional mobility status. He would benefit from skilled physical therapy services to address listed impairments, establish regular fitness program, and improve overall quality of life    Patient prognosis is Good.   Patient will benefit from skilled outpatient Physical Therapy to address the deficits stated above and in the chart below, provide patient /family  education, and to maximize patient's level of independence.     Plan of care discussed with patient: Yes  Patient's spiritual, cultural and educational needs considered and patient is agreeable to the plan of care and goals as stated below:     Anticipated Barriers for therapy: Co-morbidities    Medical Necessity is demonstrated by the following  History  Co-morbidities and personal factors that may impact the plan of care [] LOW: no personal factors / co-morbidities  [] MODERATE: 1-2 personal factors / co-morbidities  [x] HIGH: 3+ personal factors / co-morbidities    Moderate / High Support Documentation:   Co-morbidities affecting plan of care:   Anticoagulant long-term use   Coronary artery disease   Hypertension   Myelodysplastic syndrome   Peripheral vascular disease, unspecified     Personal Factors:   age     Examination  Body Structures and Functions, activity limitations and participation restrictions that may impact the plan of care [] LOW: addressing 1-2 elements  [] MODERATE: 3+ elements  [x] HIGH: 4+ elements (please support below)    Moderate / High Support Documentation: see above     Clinical Presentation [] LOW: stable  [x] MODERATE: Evolving  [] HIGH: Unstable     Decision Making/ Complexity Score: moderate       Goals:  Short Term Goals: 3 weeks   Patient will be compliant with HEP in order to maximize PT benefits  Patient will score >/= 55% on FOTO survey in order to improve self-perception of functional mobility deficits  Patient will walk >/= 1300 feet on Six-Minute Walk Test in order to improve endurance for community mobility     Long Term Goals: 6 weeks   Patient will score >/= 65% on FOTO survey in order to improve self-perception of functional mobility deficits  Patient will walk >/= 1400 feet on Six-Minute Walk Test in order to improve endurance for community mobility   Patient will score >/= 13 repetitions on 30-Second Sit to  order to improve bilateral lower extremity endurance  and muscular power for transfers   Patient will begin some form of home/community fitness in order to sustain progress gained in PT    Plan     Plan of care Certification: 7/11/2024 to 8/23/2024.    Outpatient Physical Therapy 2 times weekly for 6 weeks to include the following interventions: Gait Training, Moist Heat/ Ice, Neuromuscular Re-ed, Patient Education, Self Care, Therapeutic Activities, and Therapeutic Exercise.     DEDE HERNANDEZ, PT    Physician's Signature: _________________________________________ Date: ________________

## 2024-07-14 PROBLEM — R68.89 DECREASED ACTIVITY TOLERANCE: Status: ACTIVE | Noted: 2024-07-14

## 2024-07-15 ENCOUNTER — TELEPHONE (OUTPATIENT)
Dept: HEMATOLOGY/ONCOLOGY | Facility: CLINIC | Age: 69
End: 2024-07-15
Payer: MEDICARE

## 2024-07-15 ENCOUNTER — CLINICAL SUPPORT (OUTPATIENT)
Dept: REHABILITATION | Facility: HOSPITAL | Age: 69
End: 2024-07-15
Payer: MEDICARE

## 2024-07-15 ENCOUNTER — LAB VISIT (OUTPATIENT)
Dept: LAB | Facility: HOSPITAL | Age: 69
End: 2024-07-15
Attending: INTERNAL MEDICINE
Payer: MEDICARE

## 2024-07-15 DIAGNOSIS — Z76.82 STEM CELL TRANSPLANT CANDIDATE: ICD-10-CM

## 2024-07-15 DIAGNOSIS — D46.9 MYELODYSPLASTIC SYNDROME: ICD-10-CM

## 2024-07-15 DIAGNOSIS — D61.818 PANCYTOPENIA: Primary | ICD-10-CM

## 2024-07-15 DIAGNOSIS — D64.9 SYMPTOMATIC ANEMIA: ICD-10-CM

## 2024-07-15 DIAGNOSIS — R68.89 DECREASED ACTIVITY TOLERANCE: Primary | ICD-10-CM

## 2024-07-15 DIAGNOSIS — D61.818 PANCYTOPENIA: ICD-10-CM

## 2024-07-15 LAB
ABO + RH BLD: NORMAL
ALBUMIN SERPL BCP-MCNC: 4 G/DL (ref 3.5–5.2)
ALP SERPL-CCNC: 117 U/L (ref 55–135)
ALT SERPL W/O P-5'-P-CCNC: 21 U/L (ref 10–44)
ANION GAP SERPL CALC-SCNC: 11 MMOL/L (ref 8–16)
ANISOCYTOSIS BLD QL SMEAR: SLIGHT
AST SERPL-CCNC: 20 U/L (ref 10–40)
BASOPHILS # BLD AUTO: 0 K/UL (ref 0–0.2)
BASOPHILS NFR BLD: 0 % (ref 0–1.9)
BILIRUB SERPL-MCNC: 0.4 MG/DL (ref 0.1–1)
BLD GP AB SCN CELLS X3 SERPL QL: NORMAL
BUN SERPL-MCNC: 17 MG/DL (ref 8–23)
CALCIUM SERPL-MCNC: 9.3 MG/DL (ref 8.7–10.5)
CHLORIDE SERPL-SCNC: 108 MMOL/L (ref 95–110)
CO2 SERPL-SCNC: 24 MMOL/L (ref 23–29)
CREAT SERPL-MCNC: 0.9 MG/DL (ref 0.5–1.4)
DIFFERENTIAL METHOD BLD: ABNORMAL
EOSINOPHIL # BLD AUTO: 0 K/UL (ref 0–0.5)
EOSINOPHIL NFR BLD: 0.7 % (ref 0–8)
ERYTHROCYTE [DISTWIDTH] IN BLOOD BY AUTOMATED COUNT: 14.1 % (ref 11.5–14.5)
EST. GFR  (NO RACE VARIABLE): >60 ML/MIN/1.73 M^2
GLUCOSE SERPL-MCNC: 108 MG/DL (ref 70–110)
HCT VFR BLD AUTO: 23.8 % (ref 40–54)
HGB BLD-MCNC: 8 G/DL (ref 14–18)
HYPOCHROMIA BLD QL SMEAR: ABNORMAL
IMM GRANULOCYTES # BLD AUTO: 0.01 K/UL (ref 0–0.04)
IMM GRANULOCYTES NFR BLD AUTO: 0.7 % (ref 0–0.5)
LYMPHOCYTES # BLD AUTO: 1 K/UL (ref 1–4.8)
LYMPHOCYTES NFR BLD: 68.1 % (ref 18–48)
MAGNESIUM SERPL-MCNC: 2.2 MG/DL (ref 1.6–2.6)
MCH RBC QN AUTO: 29.5 PG (ref 27–31)
MCHC RBC AUTO-ENTMCNC: 33.6 G/DL (ref 32–36)
MCV RBC AUTO: 88 FL (ref 82–98)
MONOCYTES # BLD AUTO: 0 K/UL (ref 0.3–1)
MONOCYTES NFR BLD: 2.1 % (ref 4–15)
NEUTROPHILS # BLD AUTO: 0.4 K/UL (ref 1.8–7.7)
NEUTROPHILS NFR BLD: 28.4 % (ref 38–73)
NRBC BLD-RTO: 0 /100 WBC
PHOSPHATE SERPL-MCNC: 4.5 MG/DL (ref 2.7–4.5)
PLATELET # BLD AUTO: 7 K/UL (ref 150–450)
PLATELET BLD QL SMEAR: ABNORMAL
PMV BLD AUTO: 12.1 FL (ref 9.2–12.9)
POTASSIUM SERPL-SCNC: 4.4 MMOL/L (ref 3.5–5.1)
PROT SERPL-MCNC: 6.9 G/DL (ref 6–8.4)
RBC # BLD AUTO: 2.71 M/UL (ref 4.6–6.2)
SODIUM SERPL-SCNC: 143 MMOL/L (ref 136–145)
SPECIMEN OUTDATE: NORMAL
WBC # BLD AUTO: 1.41 K/UL (ref 3.9–12.7)

## 2024-07-15 PROCEDURE — 80053 COMPREHEN METABOLIC PANEL: CPT | Mod: NBTX | Performed by: INTERNAL MEDICINE

## 2024-07-15 PROCEDURE — P9040 RBC LEUKOREDUCED IRRADIATED: HCPCS | Mod: NBTX | Performed by: INTERNAL MEDICINE

## 2024-07-15 PROCEDURE — 83735 ASSAY OF MAGNESIUM: CPT | Mod: NBTX | Performed by: INTERNAL MEDICINE

## 2024-07-15 PROCEDURE — 84100 ASSAY OF PHOSPHORUS: CPT | Mod: NBTX | Performed by: INTERNAL MEDICINE

## 2024-07-15 PROCEDURE — 97110 THERAPEUTIC EXERCISES: CPT | Mod: PN

## 2024-07-15 PROCEDURE — 85025 COMPLETE CBC W/AUTO DIFF WBC: CPT | Mod: NBTX | Performed by: INTERNAL MEDICINE

## 2024-07-15 PROCEDURE — 86920 COMPATIBILITY TEST SPIN: CPT | Performed by: INTERNAL MEDICINE

## 2024-07-15 PROCEDURE — 86901 BLOOD TYPING SEROLOGIC RH(D): CPT | Mod: NBTX | Performed by: INTERNAL MEDICINE

## 2024-07-15 PROCEDURE — P9037 PLATE PHERES LEUKOREDU IRRAD: HCPCS | Mod: NBTX | Performed by: INTERNAL MEDICINE

## 2024-07-15 PROCEDURE — 97530 THERAPEUTIC ACTIVITIES: CPT | Mod: PN

## 2024-07-15 RX ORDER — HYDROCODONE BITARTRATE AND ACETAMINOPHEN 500; 5 MG/1; MG/1
TABLET ORAL ONCE
Status: CANCELLED | OUTPATIENT
Start: 2024-07-15 | End: 2024-07-15

## 2024-07-15 RX ORDER — ACETAMINOPHEN 325 MG/1
650 TABLET ORAL
Status: CANCELLED | OUTPATIENT
Start: 2024-07-15

## 2024-07-15 NOTE — TELEPHONE ENCOUNTER
Spoke to patient's daughter to advise that transfusion appt had been moved to 0930 tomorrow. Daughter verbalized understanding.

## 2024-07-15 NOTE — PROGRESS NOTES
[facility]   Physical Therapy Treatment Note      Name: Guillaume Salinas  Clinic Number: [id number]    Therapy Diagnosis:   Encounter Diagnosis   Name Primary?    Decreased activity tolerance Yes     Physician: Mohit Hickey MD    Visit Date: 7/15/2024    Physician Orders: PT Eval and Treat   Medical Diagnosis from Referral: Myelodysplastic syndrome [D46.9], Stem cell transplant candidate [Z76.82], Physical deconditioning [R53.81]   Evaluation Date: 7/11/2024  Authorization Period Expiration: 6/21/2025  Plan of Care Expiration: 8/23/2024  Progress Note Due: 8/11/2024  Date of Surgery: N/A  Visit # / Visits authorized: 1/20 + eval  FOTO: 1/3     Precautions: Standard and cancer; AMN  utilized throughout visit     Time In: 1:01PM  Time Out: 1:46PM  Total Billable Time: 45 minutes (2TA + 1TE)    PTA Visit #: 0/5     Subjective     Patient reports: Platelet levels are a little low so he may be getting transfusion soon.  He  will be  compliant with home exercise program.  Response to previous treatment: last session was initial evaluation  Functional change: last session was initial evaluation    Pain: 0/10  Location: N/A      Objective      Objective Measures updated at progress report unless specified.     Treatment     Guillaume received the treatments listed below:      therapeutic exercises to develop strength and endurance for 22 minutes including:  - Standing hip abduction 2x10B green theraband  - Standing hip extension 2x10B green theraband  - Standing calf raises 2x15  - Session ended on stepper x 5 minutes level 4 double peak    neuromuscular re-education activities to improve: Balance, Coordination, Kinesthetic, Sense, and Proprioception for 00 minutes. The following activities were included:  Not today    therapeutic activities to improve functional performance for 23 minutes, including:  - Began on treadmill forward walking x 6 minutes total; speed  "setting 1.5  - Reciprocal step ups to 6" step x15B without upper extremity support  - Lateral tony (mixed 6" and 9") step overs x 8 lengths x 10 feet each   - Forward/reciprocal tony (mixed 6" and 9") step overs x 8 lengths x 10 feet each     Patient Education and Home Exercises       Education provided:   - HEP instruction    Written Home Exercises Provided: yes. Exercises were reviewed and Guillaume was able to demonstrate them prior to the end of the session.  Guillaume demonstrated good  understanding of the education provided. See Electronic Medical Record under Patient Instructions for exercises provided during therapy sessions    Assessment     Guillaume presented to first follow appointment with no major changes since initial evaluation other than low platelet numbers noted in recent labs. He tolerated visit with appropriate levels of fatigue and required two seated rest breaks during session. Postural control/balance is excellent and patient's greatest limited factor is cardiovascular/muscular endurance. He would benefit from continued PT services to achieve functional goals established at evaluation.    Guillaume Is progressing well towards his goals.   Patient prognosis is Good.     Patient will continue to benefit from skilled outpatient physical therapy to address the deficits listed in the problem list box on initial evaluation, provide pt/family education and to maximize pt's level of independence in the home and community environment.     Patient's spiritual, cultural and educational needs considered and pt agreeable to plan of care and goals.     Anticipated barriers to physical therapy: Co-morbidities     Goals:     Short Term Goals: 3 weeks   Patient will be compliant with HEP in order to maximize PT benefits (progressing, not met)  Patient will score >/= 55% on FOTO survey in order to improve self-perception of functional mobility deficits (progressing, not met)  Patient will walk >/= 1300 feet on " Six-Minute Walk Test in order to improve endurance for community mobility (progressing, not met)     Long Term Goals: 6 weeks   Patient will score >/= 65% on FOTO survey in order to improve self-perception of functional mobility deficits (progressing, not met)  Patient will walk >/= 1400 feet on Six-Minute Walk Test in order to improve endurance for community mobility (progressing, not met)  Patient will score >/= 13 repetitions on 30-Second Sit to  order to improve bilateral lower extremity endurance and muscular power for transfers (progressing, not met)  Patient will begin some form of home/community fitness in order to sustain progress gained in PT (progressing, not met)    Plan     Continue to progress as per plan of care    DEDE HERNANDEZ, PT

## 2024-07-16 ENCOUNTER — INFUSION (OUTPATIENT)
Dept: INFUSION THERAPY | Facility: HOSPITAL | Age: 69
End: 2024-07-16
Attending: INTERNAL MEDICINE
Payer: MEDICARE

## 2024-07-16 VITALS
DIASTOLIC BLOOD PRESSURE: 70 MMHG | OXYGEN SATURATION: 100 % | TEMPERATURE: 97 F | SYSTOLIC BLOOD PRESSURE: 113 MMHG | RESPIRATION RATE: 18 BRPM | HEART RATE: 67 BPM

## 2024-07-16 DIAGNOSIS — D61.818 PANCYTOPENIA: ICD-10-CM

## 2024-07-16 LAB
BLD PROD TYP BPU: NORMAL
BLD PROD TYP BPU: NORMAL
BLOOD UNIT EXPIRATION DATE: NORMAL
BLOOD UNIT EXPIRATION DATE: NORMAL
BLOOD UNIT TYPE CODE: 6200
BLOOD UNIT TYPE CODE: 7300
BLOOD UNIT TYPE: NORMAL
BLOOD UNIT TYPE: NORMAL
CODING SYSTEM: NORMAL
CODING SYSTEM: NORMAL
CROSSMATCH INTERPRETATION: NORMAL
CROSSMATCH INTERPRETATION: NORMAL
DISPENSE STATUS: NORMAL
DISPENSE STATUS: NORMAL
NUM UNITS TRANS PACKED RBC: NORMAL
UNIT NUMBER: NORMAL

## 2024-07-16 PROCEDURE — 36430 TRANSFUSION BLD/BLD COMPNT: CPT

## 2024-07-16 RX ORDER — HYDROCODONE BITARTRATE AND ACETAMINOPHEN 500; 5 MG/1; MG/1
TABLET ORAL ONCE
Status: DISCONTINUED | OUTPATIENT
Start: 2024-07-16 | End: 2024-07-16 | Stop reason: HOSPADM

## 2024-07-16 RX ORDER — ACETAMINOPHEN 325 MG/1
650 TABLET ORAL
Status: DISCONTINUED | OUTPATIENT
Start: 2024-07-16 | End: 2024-07-16 | Stop reason: HOSPADM

## 2024-07-16 NOTE — NURSING
Pt tolerated 1 unit of PRBC and 1 unit of PLT infusion well.  No adverse reaction noted.   IV flushed with NS and D/C per protocol.  Patient left clinic in no acute distress.

## 2024-07-17 DIAGNOSIS — M79.2 NEUROPATHIC PAIN: ICD-10-CM

## 2024-07-17 DIAGNOSIS — G47.00 INSOMNIA, UNSPECIFIED TYPE: ICD-10-CM

## 2024-07-17 DIAGNOSIS — K20.90 ESOPHAGITIS: ICD-10-CM

## 2024-07-17 RX ORDER — ZOLPIDEM TARTRATE 10 MG/1
10 TABLET ORAL NIGHTLY PRN
Qty: 30 TABLET | Refills: 0 | Status: SHIPPED | OUTPATIENT
Start: 2024-07-17

## 2024-07-17 RX ORDER — GABAPENTIN 300 MG/1
600 CAPSULE ORAL 3 TIMES DAILY
Qty: 180 CAPSULE | Refills: 11 | Status: SHIPPED | OUTPATIENT
Start: 2024-07-17

## 2024-07-17 RX ORDER — PANTOPRAZOLE SODIUM 40 MG/1
40 TABLET, DELAYED RELEASE ORAL DAILY
Qty: 30 TABLET | Refills: 3 | Status: SHIPPED | OUTPATIENT
Start: 2024-07-17 | End: 2024-11-14

## 2024-07-18 ENCOUNTER — TELEPHONE (OUTPATIENT)
Dept: HEMATOLOGY/ONCOLOGY | Facility: CLINIC | Age: 69
End: 2024-07-18
Payer: MEDICARE

## 2024-07-18 ENCOUNTER — CLINICAL SUPPORT (OUTPATIENT)
Dept: REHABILITATION | Facility: HOSPITAL | Age: 69
End: 2024-07-18
Payer: MEDICARE

## 2024-07-18 ENCOUNTER — LAB VISIT (OUTPATIENT)
Dept: LAB | Facility: HOSPITAL | Age: 69
End: 2024-07-18
Attending: INTERNAL MEDICINE
Payer: MEDICARE

## 2024-07-18 DIAGNOSIS — R68.89 DECREASED ACTIVITY TOLERANCE: Primary | ICD-10-CM

## 2024-07-18 DIAGNOSIS — Z76.82 STEM CELL TRANSPLANT CANDIDATE: ICD-10-CM

## 2024-07-18 DIAGNOSIS — D46.9 MYELODYSPLASTIC SYNDROME: ICD-10-CM

## 2024-07-18 LAB
ABO + RH BLD: NORMAL
ALBUMIN SERPL BCP-MCNC: 3.7 G/DL (ref 3.5–5.2)
ALP SERPL-CCNC: 109 U/L (ref 55–135)
ALT SERPL W/O P-5'-P-CCNC: 27 U/L (ref 10–44)
ANION GAP SERPL CALC-SCNC: 7 MMOL/L (ref 8–16)
ANISOCYTOSIS BLD QL SMEAR: SLIGHT
AST SERPL-CCNC: 22 U/L (ref 10–40)
BASOPHILS NFR BLD: 0 % (ref 0–1.9)
BILIRUB SERPL-MCNC: 0.3 MG/DL (ref 0.1–1)
BLD GP AB SCN CELLS X3 SERPL QL: NORMAL
BUN SERPL-MCNC: 13 MG/DL (ref 8–23)
CALCIUM SERPL-MCNC: 9.3 MG/DL (ref 8.7–10.5)
CHLORIDE SERPL-SCNC: 107 MMOL/L (ref 95–110)
CO2 SERPL-SCNC: 26 MMOL/L (ref 23–29)
CREAT SERPL-MCNC: 0.9 MG/DL (ref 0.5–1.4)
DIFFERENTIAL METHOD BLD: ABNORMAL
EOSINOPHIL NFR BLD: 1 % (ref 0–8)
ERYTHROCYTE [DISTWIDTH] IN BLOOD BY AUTOMATED COUNT: 13.7 % (ref 11.5–14.5)
EST. GFR  (NO RACE VARIABLE): >60 ML/MIN/1.73 M^2
GLUCOSE SERPL-MCNC: 115 MG/DL (ref 70–110)
HCT VFR BLD AUTO: 25 % (ref 40–54)
HGB BLD-MCNC: 8.5 G/DL (ref 14–18)
HYPOCHROMIA BLD QL SMEAR: ABNORMAL
IMM GRANULOCYTES # BLD AUTO: ABNORMAL K/UL (ref 0–0.04)
IMM GRANULOCYTES NFR BLD AUTO: ABNORMAL % (ref 0–0.5)
LYMPHOCYTES NFR BLD: 76 % (ref 18–48)
MAGNESIUM SERPL-MCNC: 2.1 MG/DL (ref 1.6–2.6)
MCH RBC QN AUTO: 29.6 PG (ref 27–31)
MCHC RBC AUTO-ENTMCNC: 34 G/DL (ref 32–36)
MCV RBC AUTO: 87 FL (ref 82–98)
MONOCYTES NFR BLD: 1 % (ref 4–15)
NEUTROPHILS NFR BLD: 22 % (ref 38–73)
NRBC BLD-RTO: 0 /100 WBC
PHOSPHATE SERPL-MCNC: 4 MG/DL (ref 2.7–4.5)
PLATELET # BLD AUTO: 32 K/UL (ref 150–450)
PLATELET BLD QL SMEAR: ABNORMAL
PMV BLD AUTO: 10.7 FL (ref 9.2–12.9)
POTASSIUM SERPL-SCNC: 3.9 MMOL/L (ref 3.5–5.1)
PROT SERPL-MCNC: 6.7 G/DL (ref 6–8.4)
RBC # BLD AUTO: 2.87 M/UL (ref 4.6–6.2)
SODIUM SERPL-SCNC: 140 MMOL/L (ref 136–145)
SPECIMEN OUTDATE: NORMAL
WBC # BLD AUTO: 1.13 K/UL (ref 3.9–12.7)

## 2024-07-18 PROCEDURE — 85007 BL SMEAR W/DIFF WBC COUNT: CPT | Performed by: INTERNAL MEDICINE

## 2024-07-18 PROCEDURE — 85027 COMPLETE CBC AUTOMATED: CPT | Performed by: INTERNAL MEDICINE

## 2024-07-18 PROCEDURE — 83735 ASSAY OF MAGNESIUM: CPT | Performed by: INTERNAL MEDICINE

## 2024-07-18 PROCEDURE — 80053 COMPREHEN METABOLIC PANEL: CPT | Performed by: INTERNAL MEDICINE

## 2024-07-18 PROCEDURE — 97110 THERAPEUTIC EXERCISES: CPT | Mod: PN

## 2024-07-18 PROCEDURE — 84100 ASSAY OF PHOSPHORUS: CPT | Performed by: INTERNAL MEDICINE

## 2024-07-18 PROCEDURE — 97530 THERAPEUTIC ACTIVITIES: CPT | Mod: PN

## 2024-07-18 PROCEDURE — 86850 RBC ANTIBODY SCREEN: CPT | Performed by: INTERNAL MEDICINE

## 2024-07-18 PROCEDURE — 86900 BLOOD TYPING SEROLOGIC ABO: CPT | Performed by: INTERNAL MEDICINE

## 2024-07-18 PROCEDURE — 86901 BLOOD TYPING SEROLOGIC RH(D): CPT | Performed by: INTERNAL MEDICINE

## 2024-07-18 NOTE — PROGRESS NOTES
[facility]   Physical Therapy Treatment Note      Name: Guillaume Salinas  Clinic Number: [id number]    Therapy Diagnosis:   Encounter Diagnosis   Name Primary?    Decreased activity tolerance Yes       Physician: Mohit Hickey MD    Visit Date: 7/18/2024    Physician Orders: PT Eval and Treat   Medical Diagnosis from Referral: Myelodysplastic syndrome [D46.9], Stem cell transplant candidate [Z76.82], Physical deconditioning [R53.81]   Evaluation Date: 7/11/2024  Authorization Period Expiration: 6/21/2025  Plan of Care Expiration: 8/23/2024  Progress Note Due: 8/11/2024  Date of Surgery: N/A  Visit # / Visits authorized: 2/20 + eval  FOTO: 1/3     Precautions: Standard and cancer; AMN  utilized throughout visit     Time In: 1345  Time Out: 1430  Total Billable Time: 45 minutes (2TE + 1TA)    PTA Visit #: 0/5     Subjective     Patient reports: Transfusion yesterday. Feeling OK today.   He  will be  compliant with home exercise program. Has done home exercise program 2 days.   Response to previous treatment: last session was initial evaluation  Functional change: last session was initial evaluation    Pain: 0/10  Location: N/A      Objective      Objective Measures updated at progress report unless specified.     Treatment     Guillaume received the treatments listed below:      therapeutic exercises to develop strength and endurance for 35 minutes including:  - Standing hip abduction 2x10B green theraband (ankle weights 1st set, swapped back to green band)   - Standing hip extension 2x10B green theraband (ankle weights 1st set, swapped back to green band)   - Standing calf raises 2x15 - no support   - standing toe raises 2 x 15   - squats with adduction on ball 2 x 10 - some knee discomfort despite technique cues, still tendency to anterior with knees.     - Session ended on stepper x 10 minutes level 4 progressive intervals     therapeutic activities to  "improve functional performance for 10 minutes, including:  -sit<>stands  - Lateral tony (mixed 6" and 9") step overs x 8 lengths x 10 feet each   - Forward/reciprocal tony (mixed 6" and 9") step overs x 8 lengths x 10 feet each     Patient Education and Home Exercises       Education provided:   - HEP instruction    Written Home Exercises Provided: yes. Exercises were reviewed and Guillaume was able to demonstrate them prior to the end of the session.  Guillaume demonstrated good  understanding of the education provided. See Electronic Medical Record under Patient Instructions for exercises provided during therapy sessions    Assessment     Guillaume tolerated today's session well. He reports good compliance at home with home exercise program. He denies any specific challenges at home other than trying to build strength and endurance to prep for upcoming bone marrow transplant. Energy was good today 1 day after transfusion and he tolerated 10 min on recumbent stepper.  He would benefit from continued PT services to achieve functional goals established at evaluation.    Guillaume Is progressing well towards his goals.   Patient prognosis is Good.     Patient will continue to benefit from skilled outpatient physical therapy to address the deficits listed in the problem list box on initial evaluation, provide pt/family education and to maximize pt's level of independence in the home and community environment.     Patient's spiritual, cultural and educational needs considered and pt agreeable to plan of care and goals.     Anticipated barriers to physical therapy: Co-morbidities     Goals:     Short Term Goals: 3 weeks   Patient will be compliant with HEP in order to maximize PT benefits (progressing, not met)  Patient will score >/= 55% on FOTO survey in order to improve self-perception of functional mobility deficits (progressing, not met)  Patient will walk >/= 1300 feet on Six-Minute Walk Test in order to improve endurance " for community mobility (progressing, not met)     Long Term Goals: 6 weeks   Patient will score >/= 65% on FOTO survey in order to improve self-perception of functional mobility deficits (progressing, not met)  Patient will walk >/= 1400 feet on Six-Minute Walk Test in order to improve endurance for community mobility (progressing, not met)  Patient will score >/= 13 repetitions on 30-Second Sit to  order to improve bilateral lower extremity endurance and muscular power for transfers (progressing, not met)  Patient will begin some form of home/community fitness in order to sustain progress gained in PT (progressing, not met)    Plan     Continue to progress as per plan of care    Sherry eLon, PT

## 2024-07-19 ENCOUNTER — PATIENT MESSAGE (OUTPATIENT)
Dept: ADMINISTRATIVE | Facility: OTHER | Age: 69
End: 2024-07-19
Payer: MEDICARE

## 2024-07-22 ENCOUNTER — PATIENT MESSAGE (OUTPATIENT)
Dept: HEMATOLOGY/ONCOLOGY | Facility: CLINIC | Age: 69
End: 2024-07-22
Payer: MEDICARE

## 2024-07-22 ENCOUNTER — DOCUMENTATION ONLY (OUTPATIENT)
Dept: HEMATOLOGY/ONCOLOGY | Facility: CLINIC | Age: 69
End: 2024-07-22
Payer: MEDICARE

## 2024-07-22 ENCOUNTER — LAB VISIT (OUTPATIENT)
Dept: LAB | Facility: HOSPITAL | Age: 69
End: 2024-07-22
Attending: INTERNAL MEDICINE
Payer: MEDICARE

## 2024-07-22 ENCOUNTER — TELEPHONE (OUTPATIENT)
Dept: HEMATOLOGY/ONCOLOGY | Facility: CLINIC | Age: 69
End: 2024-07-22
Payer: MEDICARE

## 2024-07-22 ENCOUNTER — CLINICAL SUPPORT (OUTPATIENT)
Dept: REHABILITATION | Facility: HOSPITAL | Age: 69
End: 2024-07-22
Payer: MEDICARE

## 2024-07-22 DIAGNOSIS — D46.9 MYELODYSPLASTIC SYNDROME: ICD-10-CM

## 2024-07-22 DIAGNOSIS — Z76.82 STEM CELL TRANSPLANT CANDIDATE: ICD-10-CM

## 2024-07-22 DIAGNOSIS — R68.89 DECREASED ACTIVITY TOLERANCE: Primary | ICD-10-CM

## 2024-07-22 LAB
ABO + RH BLD: NORMAL
ALBUMIN SERPL BCP-MCNC: 4 G/DL (ref 3.5–5.2)
ALP SERPL-CCNC: 107 U/L (ref 55–135)
ALT SERPL W/O P-5'-P-CCNC: 30 U/L (ref 10–44)
ANION GAP SERPL CALC-SCNC: 12 MMOL/L (ref 8–16)
ANISOCYTOSIS BLD QL SMEAR: SLIGHT
AST SERPL-CCNC: 23 U/L (ref 10–40)
BASOPHILS # BLD AUTO: 0 K/UL (ref 0–0.2)
BASOPHILS NFR BLD: 0 % (ref 0–1.9)
BILIRUB SERPL-MCNC: 0.5 MG/DL (ref 0.1–1)
BLD GP AB SCN CELLS X3 SERPL QL: NORMAL
BUN SERPL-MCNC: 14 MG/DL (ref 8–23)
CALCIUM SERPL-MCNC: 9.6 MG/DL (ref 8.7–10.5)
CHLORIDE SERPL-SCNC: 106 MMOL/L (ref 95–110)
CO2 SERPL-SCNC: 24 MMOL/L (ref 23–29)
CREAT SERPL-MCNC: 0.9 MG/DL (ref 0.5–1.4)
DIFFERENTIAL METHOD BLD: ABNORMAL
EOSINOPHIL # BLD AUTO: 0 K/UL (ref 0–0.5)
EOSINOPHIL NFR BLD: 1 % (ref 0–8)
ERYTHROCYTE [DISTWIDTH] IN BLOOD BY AUTOMATED COUNT: 12.8 % (ref 11.5–14.5)
EST. GFR  (NO RACE VARIABLE): >60 ML/MIN/1.73 M^2
GLUCOSE SERPL-MCNC: 116 MG/DL (ref 70–110)
HCT VFR BLD AUTO: 25.3 % (ref 40–54)
HGB BLD-MCNC: 8.5 G/DL (ref 14–18)
IMM GRANULOCYTES # BLD AUTO: 0 K/UL (ref 0–0.04)
IMM GRANULOCYTES NFR BLD AUTO: 0 % (ref 0–0.5)
LYMPHOCYTES # BLD AUTO: 0.9 K/UL (ref 1–4.8)
LYMPHOCYTES NFR BLD: 87.5 % (ref 18–48)
MAGNESIUM SERPL-MCNC: 2.1 MG/DL (ref 1.6–2.6)
MCH RBC QN AUTO: 29.3 PG (ref 27–31)
MCHC RBC AUTO-ENTMCNC: 33.6 G/DL (ref 32–36)
MCV RBC AUTO: 87 FL (ref 82–98)
MONOCYTES # BLD AUTO: 0 K/UL (ref 0.3–1)
MONOCYTES NFR BLD: 1.9 % (ref 4–15)
NEUTROPHILS # BLD AUTO: 0.1 K/UL (ref 1.8–7.7)
NEUTROPHILS NFR BLD: 9.6 % (ref 38–73)
NRBC BLD-RTO: 0 /100 WBC
PHOSPHATE SERPL-MCNC: 4.2 MG/DL (ref 2.7–4.5)
PLATELET # BLD AUTO: 17 K/UL (ref 150–450)
PLATELET BLD QL SMEAR: ABNORMAL
PMV BLD AUTO: 11.8 FL (ref 9.2–12.9)
POTASSIUM SERPL-SCNC: 4.4 MMOL/L (ref 3.5–5.1)
PROT SERPL-MCNC: 7.3 G/DL (ref 6–8.4)
RBC # BLD AUTO: 2.9 M/UL (ref 4.6–6.2)
SODIUM SERPL-SCNC: 142 MMOL/L (ref 136–145)
SPECIMEN OUTDATE: NORMAL
WBC # BLD AUTO: 1.04 K/UL (ref 3.9–12.7)

## 2024-07-22 PROCEDURE — 86850 RBC ANTIBODY SCREEN: CPT | Performed by: INTERNAL MEDICINE

## 2024-07-22 PROCEDURE — 97530 THERAPEUTIC ACTIVITIES: CPT | Mod: PN,CQ

## 2024-07-22 PROCEDURE — 36415 COLL VENOUS BLD VENIPUNCTURE: CPT | Performed by: INTERNAL MEDICINE

## 2024-07-22 PROCEDURE — 80053 COMPREHEN METABOLIC PANEL: CPT | Performed by: INTERNAL MEDICINE

## 2024-07-22 PROCEDURE — 85025 COMPLETE CBC W/AUTO DIFF WBC: CPT | Performed by: INTERNAL MEDICINE

## 2024-07-22 PROCEDURE — 86900 BLOOD TYPING SEROLOGIC ABO: CPT | Performed by: INTERNAL MEDICINE

## 2024-07-22 PROCEDURE — 97110 THERAPEUTIC EXERCISES: CPT | Mod: PN,CQ

## 2024-07-22 PROCEDURE — 83735 ASSAY OF MAGNESIUM: CPT | Performed by: INTERNAL MEDICINE

## 2024-07-22 PROCEDURE — 84100 ASSAY OF PHOSPHORUS: CPT | Performed by: INTERNAL MEDICINE

## 2024-07-22 NOTE — PROGRESS NOTES
The patient was approved by [employer] program for the medication (Prevyrmis). The shipment will be mailed to the patient's residence overnight.  [facility] Ask the the patient give the agency a call when he is down to four tablets to [phone].

## 2024-07-22 NOTE — PROGRESS NOTES
"[facility]   Physical Therapy Treatment Note      Name: Guillaume Salinas  Clinic Number: [id number]    Therapy Diagnosis:   Encounter Diagnosis   Name Primary?    Decreased activity tolerance Yes       Physician: Mohit Hickey MD    Visit Date: 7/22/2024    Physician Orders: PT Eval and Treat   Medical Diagnosis from Referral: Myelodysplastic syndrome [D46.9], Stem cell transplant candidate [Z76.82], Physical deconditioning [R53.81]   Evaluation Date: 7/11/2024  Authorization Period Expiration: 6/21/2025  Plan of Care Expiration: 8/23/2024  Progress Note Due: 8/11/2024  Date of Surgery: N/A  Visit # / Visits authorized: 3/20 + eval  FOTO: 1/3     Precautions: Standard and cancer; AMN  utilized throughout visit     Time In: 1:45 PM  Time Out: 2:30 PM  Total Billable Time: 45 minutes (1TE + 2TA)    PTA Visit #: 1/5     Subjective     Patient reports: Feeling a little tired today but no complaints of pain.  He  will be  compliant with home exercise program. Has done home exercise program 2 days.   Response to previous treatment: last session was initial evaluation  Functional change: last session was initial evaluation    Pain: 0/10  Location: N/A      Objective      Objective Measures updated at progress report unless specified.     Treatment     Guillaume received the treatments listed below:      therapeutic exercises to develop strength and endurance for 20 minutes including:  - Standing hip abduction 2x10B green theraband   - Standing hip extension 2x10B green theraband    - Standing calf raises 2x15 - no support   - standing toe raises 2 x 15     - Session ended on treadmill x 8 minutes 1.5 speed     Not completed today  - Session ended on stepper x 10 minutes level 4 progressive intervals     therapeutic activities to improve functional performance for 25 minutes, including:  - Lateral tony (mixed 6" and 9") step overs x 8 lengths x 10 feet each   - " "Forward/reciprocal tony (mixed 6" and 9") step overs x 8 lengths x 10 feet each   - step up with contralateral hip : 2x12 B 6" step    Circuit x3  - farmer's carries with 5# weights x 150'  - step lunges: x 10 B  - squat over chair holding 8# dumbbell x10       Not completed today  - sit<>stands  - squats with adduction on ball 2 x 10 - some knee discomfort despite technique cues, still tendency to anterior with knees.       Patient Education and Home Exercises       Education provided:   - HEP instruction    Written Home Exercises Provided: yes. Exercises were reviewed and Guillaume was able to demonstrate them prior to the end of the session.  Guillaume demonstrated good  understanding of the education provided. See Electronic Medical Record under Patient Instructions for exercises provided during therapy sessions    Assessment     Guillaume arrived to session with complaints of mild fatigue and was agreeable to treatment.  Good tolerance of treatment today with introduction of circuit training and treadmill walking with desired challenge level achieved.  Three brief seated rest breaks for fatigue recovery but was receptive to taking standing breaks as well.  He would benefit from continued PT services to achieve functional goals established at evaluation.    Guillaume Is progressing well towards his goals.   Patient prognosis is Good.     Patient will continue to benefit from skilled outpatient physical therapy to address the deficits listed in the problem list box on initial evaluation, provide pt/family education and to maximize pt's level of independence in the home and community environment.     Patient's spiritual, cultural and educational needs considered and pt agreeable to plan of care and goals.     Anticipated barriers to physical therapy: Co-morbidities     Goals:     Short Term Goals: 3 weeks   Patient will be compliant with HEP in order to maximize PT benefits (progressing, not met)  Patient will score " >/= 55% on FOTO survey in order to improve self-perception of functional mobility deficits (progressing, not met)  Patient will walk >/= 1300 feet on Six-Minute Walk Test in order to improve endurance for community mobility (progressing, not met)     Long Term Goals: 6 weeks   Patient will score >/= 65% on FOTO survey in order to improve self-perception of functional mobility deficits (progressing, not met)  Patient will walk >/= 1400 feet on Six-Minute Walk Test in order to improve endurance for community mobility (progressing, not met)  Patient will score >/= 13 repetitions on 30-Second Sit to  order to improve bilateral lower extremity endurance and muscular power for transfers (progressing, not met)  Patient will begin some form of home/community fitness in order to sustain progress gained in PT (progressing, not met)    Plan     Continue to progress as per plan of care    Eulalia Krueger, PTA

## 2024-07-25 ENCOUNTER — PATIENT MESSAGE (OUTPATIENT)
Dept: HEMATOLOGY/ONCOLOGY | Facility: CLINIC | Age: 69
End: 2024-07-25
Payer: MEDICARE

## 2024-07-25 ENCOUNTER — LAB VISIT (OUTPATIENT)
Dept: LAB | Facility: HOSPITAL | Age: 69
End: 2024-07-25
Attending: INTERNAL MEDICINE
Payer: MEDICARE

## 2024-07-25 ENCOUNTER — TELEPHONE (OUTPATIENT)
Dept: HEMATOLOGY/ONCOLOGY | Facility: CLINIC | Age: 69
End: 2024-07-25
Payer: MEDICARE

## 2024-07-25 DIAGNOSIS — D46.9 MYELODYSPLASTIC SYNDROME: ICD-10-CM

## 2024-07-25 DIAGNOSIS — Z76.82 STEM CELL TRANSPLANT CANDIDATE: ICD-10-CM

## 2024-07-25 LAB
ABO + RH BLD: NORMAL
ALBUMIN SERPL BCP-MCNC: 3.8 G/DL (ref 3.5–5.2)
ALP SERPL-CCNC: 105 U/L (ref 55–135)
ALT SERPL W/O P-5'-P-CCNC: 30 U/L (ref 10–44)
ANION GAP SERPL CALC-SCNC: 9 MMOL/L (ref 8–16)
ANISOCYTOSIS BLD QL SMEAR: SLIGHT
AST SERPL-CCNC: 21 U/L (ref 10–40)
BASOPHILS NFR BLD: 0 % (ref 0–1.9)
BILIRUB SERPL-MCNC: 0.3 MG/DL (ref 0.1–1)
BLD GP AB SCN CELLS X3 SERPL QL: NORMAL
BUN SERPL-MCNC: 19 MG/DL (ref 8–23)
CALCIUM SERPL-MCNC: 9.3 MG/DL (ref 8.7–10.5)
CHLORIDE SERPL-SCNC: 108 MMOL/L (ref 95–110)
CO2 SERPL-SCNC: 24 MMOL/L (ref 23–29)
CREAT SERPL-MCNC: 1 MG/DL (ref 0.5–1.4)
DIFFERENTIAL METHOD BLD: ABNORMAL
EOSINOPHIL NFR BLD: 5 % (ref 0–8)
ERYTHROCYTE [DISTWIDTH] IN BLOOD BY AUTOMATED COUNT: 12.7 % (ref 11.5–14.5)
EST. GFR  (NO RACE VARIABLE): >60 ML/MIN/1.73 M^2
GLUCOSE SERPL-MCNC: 152 MG/DL (ref 70–110)
HCT VFR BLD AUTO: 22.2 % (ref 40–54)
HGB BLD-MCNC: 7.6 G/DL (ref 14–18)
HYPOCHROMIA BLD QL SMEAR: ABNORMAL
IMM GRANULOCYTES # BLD AUTO: ABNORMAL K/UL (ref 0–0.04)
IMM GRANULOCYTES NFR BLD AUTO: ABNORMAL % (ref 0–0.5)
LYMPHOCYTES NFR BLD: 93 % (ref 18–48)
MAGNESIUM SERPL-MCNC: 2.2 MG/DL (ref 1.6–2.6)
MCH RBC QN AUTO: 29.6 PG (ref 27–31)
MCHC RBC AUTO-ENTMCNC: 34.2 G/DL (ref 32–36)
MCV RBC AUTO: 86 FL (ref 82–98)
MONOCYTES NFR BLD: 0 % (ref 4–15)
NEUTROPHILS NFR BLD: 2 % (ref 38–73)
NRBC BLD-RTO: 0 /100 WBC
PHOSPHATE SERPL-MCNC: 4.3 MG/DL (ref 2.7–4.5)
PLATELET # BLD AUTO: 13 K/UL (ref 150–450)
PLATELET BLD QL SMEAR: ABNORMAL
PMV BLD AUTO: 12 FL (ref 9.2–12.9)
POTASSIUM SERPL-SCNC: 4.2 MMOL/L (ref 3.5–5.1)
PROT SERPL-MCNC: 6.8 G/DL (ref 6–8.4)
RBC # BLD AUTO: 2.57 M/UL (ref 4.6–6.2)
SODIUM SERPL-SCNC: 141 MMOL/L (ref 136–145)
SPECIMEN OUTDATE: NORMAL
WBC # BLD AUTO: 1.02 K/UL (ref 3.9–12.7)

## 2024-07-25 PROCEDURE — 83735 ASSAY OF MAGNESIUM: CPT | Mod: NBTX | Performed by: INTERNAL MEDICINE

## 2024-07-25 PROCEDURE — 85007 BL SMEAR W/DIFF WBC COUNT: CPT | Mod: NBTX | Performed by: INTERNAL MEDICINE

## 2024-07-25 PROCEDURE — 86901 BLOOD TYPING SEROLOGIC RH(D): CPT | Performed by: INTERNAL MEDICINE

## 2024-07-25 PROCEDURE — 85027 COMPLETE CBC AUTOMATED: CPT | Mod: NBTX | Performed by: INTERNAL MEDICINE

## 2024-07-25 PROCEDURE — 36415 COLL VENOUS BLD VENIPUNCTURE: CPT | Performed by: INTERNAL MEDICINE

## 2024-07-25 PROCEDURE — 84100 ASSAY OF PHOSPHORUS: CPT | Mod: NBTX | Performed by: INTERNAL MEDICINE

## 2024-07-25 PROCEDURE — 80053 COMPREHEN METABOLIC PANEL: CPT | Mod: NBTX | Performed by: INTERNAL MEDICINE

## 2024-07-26 ENCOUNTER — CLINICAL SUPPORT (OUTPATIENT)
Dept: REHABILITATION | Facility: HOSPITAL | Age: 69
End: 2024-07-26
Payer: MEDICARE

## 2024-07-26 DIAGNOSIS — D49.9 IMMUNODEFICIENCY SECONDARY TO NEOPLASM: ICD-10-CM

## 2024-07-26 DIAGNOSIS — D84.81 IMMUNODEFICIENCY SECONDARY TO NEOPLASM: ICD-10-CM

## 2024-07-26 DIAGNOSIS — R68.89 DECREASED ACTIVITY TOLERANCE: Primary | ICD-10-CM

## 2024-07-26 PROCEDURE — 97530 THERAPEUTIC ACTIVITIES: CPT | Mod: PN,CQ

## 2024-07-26 PROCEDURE — 97110 THERAPEUTIC EXERCISES: CPT | Mod: PN,CQ

## 2024-07-26 RX ORDER — ONDANSETRON HYDROCHLORIDE 8 MG/1
16 TABLET, FILM COATED ORAL
OUTPATIENT
Start: 2024-08-02

## 2024-07-26 RX ORDER — AZACITIDINE 100 MG/1
75 INJECTION, POWDER, LYOPHILIZED, FOR SOLUTION INTRAVENOUS; SUBCUTANEOUS
OUTPATIENT
Start: 2024-08-01

## 2024-07-26 RX ORDER — ONDANSETRON HYDROCHLORIDE 8 MG/1
16 TABLET, FILM COATED ORAL
OUTPATIENT
Start: 2024-07-29

## 2024-07-26 RX ORDER — ONDANSETRON HYDROCHLORIDE 8 MG/1
16 TABLET, FILM COATED ORAL
OUTPATIENT
Start: 2024-08-01

## 2024-07-26 RX ORDER — AZACITIDINE 100 MG/1
75 INJECTION, POWDER, LYOPHILIZED, FOR SOLUTION INTRAVENOUS; SUBCUTANEOUS
OUTPATIENT
Start: 2024-07-29

## 2024-07-26 RX ORDER — AZACITIDINE 100 MG/1
75 INJECTION, POWDER, LYOPHILIZED, FOR SOLUTION INTRAVENOUS; SUBCUTANEOUS
OUTPATIENT
Start: 2024-07-30

## 2024-07-26 RX ORDER — AZACITIDINE 100 MG/1
75 INJECTION, POWDER, LYOPHILIZED, FOR SOLUTION INTRAVENOUS; SUBCUTANEOUS
OUTPATIENT
Start: 2024-08-02

## 2024-07-26 RX ORDER — ONDANSETRON HYDROCHLORIDE 8 MG/1
16 TABLET, FILM COATED ORAL
OUTPATIENT
Start: 2024-07-30

## 2024-07-26 RX ORDER — ONDANSETRON HYDROCHLORIDE 8 MG/1
16 TABLET, FILM COATED ORAL
OUTPATIENT
Start: 2024-07-31

## 2024-07-26 RX ORDER — AZACITIDINE 100 MG/1
75 INJECTION, POWDER, LYOPHILIZED, FOR SOLUTION INTRAVENOUS; SUBCUTANEOUS
OUTPATIENT
Start: 2024-07-31

## 2024-07-26 RX ORDER — VORICONAZOLE 200 MG/1
200 TABLET, FILM COATED ORAL 2 TIMES DAILY
Qty: 60 TABLET | Refills: 11 | Status: SHIPPED | OUTPATIENT
Start: 2024-07-26 | End: 2025-07-21

## 2024-07-26 NOTE — PROGRESS NOTES
[facility]   Physical Therapy Treatment Note      Name: Guillaume Salinas  Clinic Number: [id number]    Therapy Diagnosis:   Encounter Diagnosis   Name Primary?    Decreased activity tolerance Yes       Physician: Mohit Hickey MD    Visit Date: 7/26/2024    Physician Orders: PT Eval and Treat   Medical Diagnosis from Referral: Myelodysplastic syndrome [D46.9], Stem cell transplant candidate [Z76.82], Physical deconditioning [R53.81]   Evaluation Date: 7/11/2024  Authorization Period Expiration: 6/21/2025  Plan of Care Expiration: 8/23/2024  Progress Note Due: 8/11/2024  Date of Surgery: N/A  Visit # / Visits authorized: 4/20 + eval  FOTO: 1/3     Precautions: Standard and cancer; AMN  utilized throughout visit     Time In: 12:00 PM  Time Out: 12:45 PM  Total Billable Time: 45 minutes (1TE + 2TA)    PTA Visit #: 2/5     Subjective     Patient reports: Feeling a little tired today but no complaints of pain.  Had recent blood work done and all of his numbers were very low.  He  will be  compliant with home exercise program. Has done home exercise program 2 days.   Response to previous treatment: last session was initial evaluation  Functional change: last session was initial evaluation    Pain: 0/10  Location: N/A      Objective      Objective Measures updated at progress report unless specified.     Treatment     Guillaume received the treatments listed below:      therapeutic exercises to develop strength and endurance for 20 minutes including:  - Standing hip abduction 2x15 B green theraband   - Standing hip extension 2x15 B green theraband    - Standing calf raises 2x15 - no support   - standing toe raises 2 x 15     - Session ended on treadmill x 8 minutes 1.5 speed     Not completed today  - Session ended on stepper x 10 minutes level 4 progressive intervals     therapeutic activities to improve functional performance for 25 minutes, including:  - Lateral  "tony (mixed 6" and 9") step overs x 8 lengths x 10 feet each   - Forward/reciprocal tony (mixed 6" and 9") step overs x 8 lengths x 10 feet each   - step up with contralateral hip : 2x15 B 6" step    Circuit x3  - farmer's carries with 5# weights x 150'  - step lunges: x 10 B  - squat over chair holding 8# dumbbell x10       Not completed today  - sit<>stands  - squats with adduction on ball 2 x 10 - some knee discomfort despite technique cues, still tendency to anterior with knees.       Patient Education and Home Exercises       Education provided:   - HEP instruction    Written Home Exercises Provided: yes. Exercises were reviewed and Guillaume was able to demonstrate them prior to the end of the session.  Guillaume demonstrated good  understanding of the education provided. See Electronic Medical Record under Patient Instructions for exercises provided during therapy sessions    Assessment     Guillaume arrived to session with complaints of increased fatigue and was agreeable to treatment.  Good tolerance of treatment today with introduction of circuit training and treadmill walking with desired challenge level achieved.  Multiple seated rest breaks for fatigue recovery but was receptive to taking standing breaks as well.  He would benefit from continued PT services to achieve functional goals established at evaluation.    Guillaume Is progressing well towards his goals.   Patient prognosis is Good.     Patient will continue to benefit from skilled outpatient physical therapy to address the deficits listed in the problem list box on initial evaluation, provide pt/family education and to maximize pt's level of independence in the home and community environment.     Patient's spiritual, cultural and educational needs considered and pt agreeable to plan of care and goals.     Anticipated barriers to physical therapy: Co-morbidities     Goals:     Short Term Goals: 3 weeks   Patient will be compliant with HEP in order " to maximize PT benefits (progressing, not met)  Patient will score >/= 55% on FOTO survey in order to improve self-perception of functional mobility deficits (progressing, not met)  Patient will walk >/= 1300 feet on Six-Minute Walk Test in order to improve endurance for community mobility (progressing, not met)     Long Term Goals: 6 weeks   Patient will score >/= 65% on FOTO survey in order to improve self-perception of functional mobility deficits (progressing, not met)  Patient will walk >/= 1400 feet on Six-Minute Walk Test in order to improve endurance for community mobility (progressing, not met)  Patient will score >/= 13 repetitions on 30-Second Sit to  order to improve bilateral lower extremity endurance and muscular power for transfers (progressing, not met)  Patient will begin some form of home/community fitness in order to sustain progress gained in PT (progressing, not met)    Plan     Continue to progress as per plan of care    Eulalia Krueger, PTA

## 2024-07-29 ENCOUNTER — TELEPHONE (OUTPATIENT)
Dept: HEMATOLOGY/ONCOLOGY | Facility: CLINIC | Age: 69
End: 2024-07-29
Payer: MEDICARE

## 2024-07-29 ENCOUNTER — INFUSION (OUTPATIENT)
Dept: INFUSION THERAPY | Facility: HOSPITAL | Age: 69
End: 2024-07-29
Attending: INTERNAL MEDICINE
Payer: MEDICARE

## 2024-07-29 ENCOUNTER — LAB VISIT (OUTPATIENT)
Dept: LAB | Facility: HOSPITAL | Age: 69
End: 2024-07-29
Attending: INTERNAL MEDICINE
Payer: MEDICARE

## 2024-07-29 VITALS
HEIGHT: 68 IN | TEMPERATURE: 98 F | OXYGEN SATURATION: 97 % | SYSTOLIC BLOOD PRESSURE: 134 MMHG | BODY MASS INDEX: 24.76 KG/M2 | WEIGHT: 163.38 LBS | RESPIRATION RATE: 17 BRPM | HEART RATE: 75 BPM | DIASTOLIC BLOOD PRESSURE: 80 MMHG

## 2024-07-29 DIAGNOSIS — D46.9 MYELODYSPLASTIC SYNDROME: ICD-10-CM

## 2024-07-29 DIAGNOSIS — Z76.82 STEM CELL TRANSPLANT CANDIDATE: ICD-10-CM

## 2024-07-29 DIAGNOSIS — D64.9 SYMPTOMATIC ANEMIA: ICD-10-CM

## 2024-07-29 DIAGNOSIS — D46.9 MYELODYSPLASTIC SYNDROME: Primary | ICD-10-CM

## 2024-07-29 LAB
ABO + RH BLD: NORMAL
ALBUMIN SERPL BCP-MCNC: 3.8 G/DL (ref 3.5–5.2)
ALP SERPL-CCNC: 104 U/L (ref 55–135)
ALT SERPL W/O P-5'-P-CCNC: 31 U/L (ref 10–44)
ANION GAP SERPL CALC-SCNC: 11 MMOL/L (ref 8–16)
ANISOCYTOSIS BLD QL SMEAR: SLIGHT
AST SERPL-CCNC: 23 U/L (ref 10–40)
BASOPHILS NFR BLD: 0 % (ref 0–1.9)
BILIRUB SERPL-MCNC: 0.3 MG/DL (ref 0.1–1)
BLD GP AB SCN CELLS X3 SERPL QL: NORMAL
BUN SERPL-MCNC: 19 MG/DL (ref 8–23)
CALCIUM SERPL-MCNC: 9.3 MG/DL (ref 8.7–10.5)
CHLORIDE SERPL-SCNC: 105 MMOL/L (ref 95–110)
CO2 SERPL-SCNC: 24 MMOL/L (ref 23–29)
CREAT SERPL-MCNC: 1 MG/DL (ref 0.5–1.4)
DIFFERENTIAL METHOD BLD: ABNORMAL
EOSINOPHIL NFR BLD: 0 % (ref 0–8)
ERYTHROCYTE [DISTWIDTH] IN BLOOD BY AUTOMATED COUNT: 12.5 % (ref 11.5–14.5)
EST. GFR  (NO RACE VARIABLE): >60 ML/MIN/1.73 M^2
GLUCOSE SERPL-MCNC: 129 MG/DL (ref 70–110)
HCT VFR BLD AUTO: 21.8 % (ref 40–54)
HGB BLD-MCNC: 7.4 G/DL (ref 14–18)
HYPOCHROMIA BLD QL SMEAR: ABNORMAL
IMM GRANULOCYTES # BLD AUTO: ABNORMAL K/UL (ref 0–0.04)
IMM GRANULOCYTES NFR BLD AUTO: ABNORMAL % (ref 0–0.5)
LYMPHOCYTES NFR BLD: 91 % (ref 18–48)
MAGNESIUM SERPL-MCNC: 2.1 MG/DL (ref 1.6–2.6)
MCH RBC QN AUTO: 29.2 PG (ref 27–31)
MCHC RBC AUTO-ENTMCNC: 33.9 G/DL (ref 32–36)
MCV RBC AUTO: 86 FL (ref 82–98)
MONOCYTES NFR BLD: 0 % (ref 4–15)
NEUTROPHILS NFR BLD: 9 % (ref 38–73)
NRBC BLD-RTO: 0 /100 WBC
PHOSPHATE SERPL-MCNC: 4.1 MG/DL (ref 2.7–4.5)
PLATELET # BLD AUTO: 14 K/UL (ref 150–450)
PLATELET BLD QL SMEAR: ABNORMAL
PMV BLD AUTO: 12.4 FL (ref 9.2–12.9)
POTASSIUM SERPL-SCNC: 4.3 MMOL/L (ref 3.5–5.1)
PROT SERPL-MCNC: 7 G/DL (ref 6–8.4)
RBC # BLD AUTO: 2.53 M/UL (ref 4.6–6.2)
SODIUM SERPL-SCNC: 140 MMOL/L (ref 136–145)
SPECIMEN OUTDATE: NORMAL
WBC # BLD AUTO: 1.09 K/UL (ref 3.9–12.7)

## 2024-07-29 PROCEDURE — 84100 ASSAY OF PHOSPHORUS: CPT | Performed by: INTERNAL MEDICINE

## 2024-07-29 PROCEDURE — 80053 COMPREHEN METABOLIC PANEL: CPT | Mod: NBTX | Performed by: INTERNAL MEDICINE

## 2024-07-29 PROCEDURE — 25000003 PHARM REV CODE 250: Performed by: INTERNAL MEDICINE

## 2024-07-29 PROCEDURE — 85007 BL SMEAR W/DIFF WBC COUNT: CPT | Mod: NBTX | Performed by: INTERNAL MEDICINE

## 2024-07-29 PROCEDURE — 63600175 PHARM REV CODE 636 W HCPCS: Performed by: INTERNAL MEDICINE

## 2024-07-29 PROCEDURE — 85027 COMPLETE CBC AUTOMATED: CPT | Mod: NBTX | Performed by: INTERNAL MEDICINE

## 2024-07-29 PROCEDURE — 36415 COLL VENOUS BLD VENIPUNCTURE: CPT | Mod: NBTX | Performed by: INTERNAL MEDICINE

## 2024-07-29 PROCEDURE — 86901 BLOOD TYPING SEROLOGIC RH(D): CPT | Performed by: INTERNAL MEDICINE

## 2024-07-29 PROCEDURE — 83735 ASSAY OF MAGNESIUM: CPT | Performed by: INTERNAL MEDICINE

## 2024-07-29 PROCEDURE — 96401 CHEMO ANTI-NEOPL SQ/IM: CPT

## 2024-07-29 RX ORDER — ONDANSETRON HYDROCHLORIDE 8 MG/1
16 TABLET, FILM COATED ORAL
Status: COMPLETED | OUTPATIENT
Start: 2024-07-29 | End: 2024-07-29

## 2024-07-29 RX ORDER — AZACITIDINE 100 MG/1
75 INJECTION, POWDER, LYOPHILIZED, FOR SOLUTION INTRAVENOUS; SUBCUTANEOUS
Status: COMPLETED | OUTPATIENT
Start: 2024-07-29 | End: 2024-07-29

## 2024-07-29 RX ADMIN — ONDANSETRON HYDROCHLORIDE 16 MG: 8 TABLET, FILM COATED ORAL at 09:07

## 2024-07-29 RX ADMIN — AZACITIDINE 140 MG: 100 INJECTION, POWDER, LYOPHILIZED, FOR SOLUTION INTRAVENOUS; SUBCUTANEOUS at 10:07

## 2024-07-29 NOTE — PLAN OF CARE
Patient tolerated Cycle 11 Vidaza injection well, VSS, no complaints at this time. Treatment calendar printed and reviewed and d/c in no acute distress.

## 2024-07-30 ENCOUNTER — CLINICAL SUPPORT (OUTPATIENT)
Dept: REHABILITATION | Facility: HOSPITAL | Age: 69
End: 2024-07-30
Payer: MEDICARE

## 2024-07-30 ENCOUNTER — INFUSION (OUTPATIENT)
Dept: INFUSION THERAPY | Facility: HOSPITAL | Age: 69
End: 2024-07-30
Attending: INTERNAL MEDICINE
Payer: MEDICARE

## 2024-07-30 VITALS
SYSTOLIC BLOOD PRESSURE: 118 MMHG | HEART RATE: 89 BPM | OXYGEN SATURATION: 98 % | TEMPERATURE: 98 F | DIASTOLIC BLOOD PRESSURE: 69 MMHG | RESPIRATION RATE: 16 BRPM

## 2024-07-30 DIAGNOSIS — Z00.00 ENCOUNTER FOR MEDICARE ANNUAL WELLNESS EXAM: ICD-10-CM

## 2024-07-30 DIAGNOSIS — R68.89 DECREASED ACTIVITY TOLERANCE: Primary | ICD-10-CM

## 2024-07-30 DIAGNOSIS — D46.9 MYELODYSPLASTIC SYNDROME: Primary | ICD-10-CM

## 2024-07-30 PROCEDURE — 25000003 PHARM REV CODE 250: Performed by: INTERNAL MEDICINE

## 2024-07-30 PROCEDURE — 96401 CHEMO ANTI-NEOPL SQ/IM: CPT

## 2024-07-30 PROCEDURE — 63600175 PHARM REV CODE 636 W HCPCS: Performed by: INTERNAL MEDICINE

## 2024-07-30 PROCEDURE — 97530 THERAPEUTIC ACTIVITIES: CPT | Mod: PN

## 2024-07-30 RX ORDER — AZACITIDINE 100 MG/1
75 INJECTION, POWDER, LYOPHILIZED, FOR SOLUTION INTRAVENOUS; SUBCUTANEOUS
Status: COMPLETED | OUTPATIENT
Start: 2024-07-30 | End: 2024-07-30

## 2024-07-30 RX ORDER — ONDANSETRON HYDROCHLORIDE 8 MG/1
16 TABLET, FILM COATED ORAL
Status: COMPLETED | OUTPATIENT
Start: 2024-07-30 | End: 2024-07-30

## 2024-07-30 RX ADMIN — ONDANSETRON HYDROCHLORIDE 16 MG: 8 TABLET, FILM COATED ORAL at 10:07

## 2024-07-30 RX ADMIN — AZACITIDINE 140 MG: 100 INJECTION, POWDER, LYOPHILIZED, FOR SOLUTION INTRAVENOUS; SUBCUTANEOUS at 10:07

## 2024-07-30 NOTE — PROGRESS NOTES
[facility]   Physical Therapy Treatment Note      Name: Guillaume Salinas  Clinic Number: [id number]    Therapy Diagnosis:   Encounter Diagnosis   Name Primary?    Decreased activity tolerance Yes       Physician: Mohit Hickey MD    Visit Date: 7/30/2024    Physician Orders: PT Eval and Treat   Medical Diagnosis from Referral: Myelodysplastic syndrome [D46.9], Stem cell transplant candidate [Z76.82], Physical deconditioning [R53.81]   Evaluation Date: 7/11/2024  Authorization Period Expiration: 6/21/2025  Plan of Care Expiration: 8/23/2024  Progress Note Due: 8/11/2024  Date of Surgery: N/A  Visit # / Visits authorized: 5/20 + eval  FOTO: 1/3     Precautions: Standard and cancer; AMN  utilized throughout visit; consider holding or modifying session to seated exercise if hemoglobin numbers continue to trend low     Time In: 2:30 PM  Time Out: 3:10 PM  Total Billable Time: 40 minutes (3TA)    PTA Visit #: 0/5     Subjective     Patient reports: Continues to be tired. Asks to end session about 5 minutes early.  He  was  compliant with home exercise program. Has done home exercise program 2 days.   Response to previous treatment: No adverse response  Functional change: Ongoing    Pain: 0/10  Location: N/A      Objective      Objective Measures updated at progress report unless specified.     Vitals Mid-Session (after 1st farmer's carry)  - HR: 103bpm  - O2: 99%    Vitals Mid-Session (after 2nd farmer's carry)  - HR: 109bpm  - O2: 99%    Treatment     Guillaume received the treatments listed below:      therapeutic exercises to develop strength and endurance for 02 minutes including:  - standing hip abduction x15 B green theraband (could not tolerate additional reps)     Not completed today  - Standing hip extension 2x15 B green theraband    - Standing calf raises 2x15 - no support   - standing toe raises 2 x 15   - Session ended on stepper x 10 minutes level 4  "progressive intervals     therapeutic activities to improve functional performance for 38 minutes, including:  - Medical monitoring (see above)  - Began session on treadmill x 8 minutes 1.7 speed; incline level 1   - Lateral tony (mixed 6" and 9") step overs x 6 lengths x 10 feet each with 1.5# ankle weights  - Forward/reciprocal tony (mixed 6" and 9") step overs x 6 lengths x 10 feet each with 1.5# ankle weights  - Step up with contralateral hip : 2x15; 6" step    Circuit x2  - farmer's carries with 6# weights x 300'  - sit to stand from elevated mat + 6# dumbbell shoulder press x10      Patient Education and Home Exercises       Education provided:   - HEP instruction    Written Home Exercises Provided: yes. Exercises were reviewed and Guillaume was able to demonstrate them prior to the end of the session.  Guillaume demonstrated good  understanding of the education provided. See Electronic Medical Record under Patient Instructions for exercises provided during therapy sessions    Assessment     Guillaume arrived to session agreeable to PT. Checked hemoglobin numbers toward end of visit and they are trending lower recently. He may benefit from hold from PT if levels continue to track below 8 g/dL. Still, he tolerated today's visit very well until final 10 minutes. He has lab before next visit so plan to check his updated CBC numbers on Thursday prior to PT appointment on Friday.     Guillaume Is progressing well towards his goals.   Patient prognosis is Good.     Patient will continue to benefit from skilled outpatient physical therapy to address the deficits listed in the problem list box on initial evaluation, provide pt/family education and to maximize pt's level of independence in the home and community environment.     Patient's spiritual, cultural and educational needs considered and pt agreeable to plan of care and goals.     Anticipated barriers to physical therapy: Co-morbidities     Goals:     Short Term " Goals: 3 weeks   Patient will be compliant with HEP in order to maximize PT benefits (met)  Patient will score >/= 55% on FOTO survey in order to improve self-perception of functional mobility deficits (progressing, not met)  Patient will walk >/= 1300 feet on Six-Minute Walk Test in order to improve endurance for community mobility (progressing, not met)     Long Term Goals: 6 weeks   Patient will score >/= 65% on FOTO survey in order to improve self-perception of functional mobility deficits (progressing, not met)  Patient will walk >/= 1400 feet on Six-Minute Walk Test in order to improve endurance for community mobility (progressing, not met)  Patient will score >/= 13 repetitions on 30-Second Sit to  order to improve bilateral lower extremity endurance and muscular power for transfers (progressing, not met)  Patient will begin some form of home/community fitness in order to sustain progress gained in PT (progressing, not met)    Plan     Continue to progress as per plan of care. Check CBC on Thursday.    DEDE HERNANDEZ, PT

## 2024-07-31 ENCOUNTER — INFUSION (OUTPATIENT)
Dept: INFUSION THERAPY | Facility: HOSPITAL | Age: 69
End: 2024-07-31
Attending: INTERNAL MEDICINE
Payer: MEDICARE

## 2024-07-31 VITALS
DIASTOLIC BLOOD PRESSURE: 78 MMHG | BODY MASS INDEX: 24.76 KG/M2 | RESPIRATION RATE: 16 BRPM | SYSTOLIC BLOOD PRESSURE: 116 MMHG | WEIGHT: 163.38 LBS | HEIGHT: 68 IN | HEART RATE: 79 BPM | TEMPERATURE: 98 F | OXYGEN SATURATION: 98 %

## 2024-07-31 DIAGNOSIS — D46.9 MYELODYSPLASTIC SYNDROME: Primary | ICD-10-CM

## 2024-07-31 PROCEDURE — 25000003 PHARM REV CODE 250: Performed by: INTERNAL MEDICINE

## 2024-07-31 PROCEDURE — 96401 CHEMO ANTI-NEOPL SQ/IM: CPT

## 2024-07-31 PROCEDURE — 63600175 PHARM REV CODE 636 W HCPCS: Performed by: INTERNAL MEDICINE

## 2024-07-31 RX ORDER — ONDANSETRON HYDROCHLORIDE 8 MG/1
16 TABLET, FILM COATED ORAL
Status: COMPLETED | OUTPATIENT
Start: 2024-07-31 | End: 2024-07-31

## 2024-07-31 RX ORDER — AZACITIDINE 100 MG/1
75 INJECTION, POWDER, LYOPHILIZED, FOR SOLUTION INTRAVENOUS; SUBCUTANEOUS
Status: COMPLETED | OUTPATIENT
Start: 2024-07-31 | End: 2024-07-31

## 2024-07-31 RX ADMIN — ONDANSETRON HYDROCHLORIDE 16 MG: 8 TABLET, FILM COATED ORAL at 10:07

## 2024-07-31 RX ADMIN — AZACITIDINE 140 MG: 100 INJECTION, POWDER, LYOPHILIZED, FOR SOLUTION INTRAVENOUS; SUBCUTANEOUS at 10:07

## 2024-08-01 ENCOUNTER — TELEPHONE (OUTPATIENT)
Dept: HEMATOLOGY/ONCOLOGY | Facility: CLINIC | Age: 69
End: 2024-08-01
Payer: MEDICARE

## 2024-08-01 ENCOUNTER — LAB VISIT (OUTPATIENT)
Dept: LAB | Facility: HOSPITAL | Age: 69
End: 2024-08-01
Attending: INTERNAL MEDICINE
Payer: MEDICARE

## 2024-08-01 ENCOUNTER — INFUSION (OUTPATIENT)
Dept: INFUSION THERAPY | Facility: HOSPITAL | Age: 69
End: 2024-08-01
Attending: INTERNAL MEDICINE
Payer: MEDICARE

## 2024-08-01 VITALS
DIASTOLIC BLOOD PRESSURE: 60 MMHG | BODY MASS INDEX: 24.76 KG/M2 | SYSTOLIC BLOOD PRESSURE: 115 MMHG | HEART RATE: 83 BPM | TEMPERATURE: 98 F | OXYGEN SATURATION: 96 % | RESPIRATION RATE: 16 BRPM | WEIGHT: 163.38 LBS | HEIGHT: 68 IN

## 2024-08-01 DIAGNOSIS — D64.9 SYMPTOMATIC ANEMIA: ICD-10-CM

## 2024-08-01 DIAGNOSIS — D46.9 MYELODYSPLASTIC SYNDROME: Primary | ICD-10-CM

## 2024-08-01 DIAGNOSIS — Z76.82 STEM CELL TRANSPLANT CANDIDATE: ICD-10-CM

## 2024-08-01 DIAGNOSIS — D61.818 PANCYTOPENIA: Primary | ICD-10-CM

## 2024-08-01 DIAGNOSIS — D46.9 MYELODYSPLASTIC SYNDROME: ICD-10-CM

## 2024-08-01 LAB
ABO + RH BLD: NORMAL
ALBUMIN SERPL BCP-MCNC: 3.8 G/DL (ref 3.5–5.2)
ALP SERPL-CCNC: 96 U/L (ref 55–135)
ALT SERPL W/O P-5'-P-CCNC: 30 U/L (ref 10–44)
ANION GAP SERPL CALC-SCNC: 12 MMOL/L (ref 8–16)
ANISOCYTOSIS BLD QL SMEAR: SLIGHT
AST SERPL-CCNC: 22 U/L (ref 10–40)
BASOPHILS NFR BLD: 0 % (ref 0–1.9)
BILIRUB SERPL-MCNC: 0.8 MG/DL (ref 0.1–1)
BLD GP AB SCN CELLS X3 SERPL QL: NORMAL
BUN SERPL-MCNC: 21 MG/DL (ref 8–23)
CALCIUM SERPL-MCNC: 9.7 MG/DL (ref 8.7–10.5)
CHLORIDE SERPL-SCNC: 104 MMOL/L (ref 95–110)
CO2 SERPL-SCNC: 24 MMOL/L (ref 23–29)
CREAT SERPL-MCNC: 1.1 MG/DL (ref 0.5–1.4)
DIFFERENTIAL METHOD BLD: ABNORMAL
EOSINOPHIL NFR BLD: 0 % (ref 0–8)
ERYTHROCYTE [DISTWIDTH] IN BLOOD BY AUTOMATED COUNT: 12.7 % (ref 11.5–14.5)
EST. GFR  (NO RACE VARIABLE): >60 ML/MIN/1.73 M^2
GLUCOSE SERPL-MCNC: 131 MG/DL (ref 70–110)
HCT VFR BLD AUTO: 19.4 % (ref 40–54)
HGB BLD-MCNC: 6.6 G/DL (ref 14–18)
HYPOCHROMIA BLD QL SMEAR: ABNORMAL
IMM GRANULOCYTES # BLD AUTO: ABNORMAL K/UL (ref 0–0.04)
IMM GRANULOCYTES NFR BLD AUTO: ABNORMAL % (ref 0–0.5)
LYMPHOCYTES NFR BLD: 86 % (ref 18–48)
MAGNESIUM SERPL-MCNC: 2.1 MG/DL (ref 1.6–2.6)
MCH RBC QN AUTO: 29.3 PG (ref 27–31)
MCHC RBC AUTO-ENTMCNC: 34 G/DL (ref 32–36)
MCV RBC AUTO: 86 FL (ref 82–98)
MONOCYTES NFR BLD: 0 % (ref 4–15)
NEUTROPHILS NFR BLD: 14 % (ref 38–73)
NRBC BLD-RTO: 0 /100 WBC
PHOSPHATE SERPL-MCNC: 4.3 MG/DL (ref 2.7–4.5)
PLATELET # BLD AUTO: 15 K/UL (ref 150–450)
PLATELET BLD QL SMEAR: ABNORMAL
PMV BLD AUTO: 12.3 FL (ref 9.2–12.9)
POTASSIUM SERPL-SCNC: 4.3 MMOL/L (ref 3.5–5.1)
PROT SERPL-MCNC: 6.9 G/DL (ref 6–8.4)
RBC # BLD AUTO: 2.25 M/UL (ref 4.6–6.2)
SODIUM SERPL-SCNC: 140 MMOL/L (ref 136–145)
SPECIMEN OUTDATE: NORMAL
WBC # BLD AUTO: 1.18 K/UL (ref 3.9–12.7)

## 2024-08-01 PROCEDURE — 86901 BLOOD TYPING SEROLOGIC RH(D): CPT | Performed by: INTERNAL MEDICINE

## 2024-08-01 PROCEDURE — 85027 COMPLETE CBC AUTOMATED: CPT | Mod: NBTX | Performed by: INTERNAL MEDICINE

## 2024-08-01 PROCEDURE — 63600175 PHARM REV CODE 636 W HCPCS: Performed by: INTERNAL MEDICINE

## 2024-08-01 PROCEDURE — 86900 BLOOD TYPING SEROLOGIC ABO: CPT | Performed by: INTERNAL MEDICINE

## 2024-08-01 PROCEDURE — 86850 RBC ANTIBODY SCREEN: CPT | Mod: NBTX | Performed by: INTERNAL MEDICINE

## 2024-08-01 PROCEDURE — 86920 COMPATIBILITY TEST SPIN: CPT | Performed by: INTERNAL MEDICINE

## 2024-08-01 PROCEDURE — 80053 COMPREHEN METABOLIC PANEL: CPT | Performed by: INTERNAL MEDICINE

## 2024-08-01 PROCEDURE — 83735 ASSAY OF MAGNESIUM: CPT | Performed by: INTERNAL MEDICINE

## 2024-08-01 PROCEDURE — 85007 BL SMEAR W/DIFF WBC COUNT: CPT | Performed by: INTERNAL MEDICINE

## 2024-08-01 PROCEDURE — 96401 CHEMO ANTI-NEOPL SQ/IM: CPT

## 2024-08-01 PROCEDURE — 84100 ASSAY OF PHOSPHORUS: CPT | Performed by: INTERNAL MEDICINE

## 2024-08-01 PROCEDURE — 25000003 PHARM REV CODE 250: Performed by: INTERNAL MEDICINE

## 2024-08-01 RX ORDER — ONDANSETRON HYDROCHLORIDE 8 MG/1
16 TABLET, FILM COATED ORAL
Status: COMPLETED | OUTPATIENT
Start: 2024-08-01 | End: 2024-08-01

## 2024-08-01 RX ORDER — ACETAMINOPHEN 325 MG/1
650 TABLET ORAL
Status: CANCELLED | OUTPATIENT
Start: 2024-08-01

## 2024-08-01 RX ORDER — HYDROCODONE BITARTRATE AND ACETAMINOPHEN 500; 5 MG/1; MG/1
TABLET ORAL ONCE
Status: CANCELLED | OUTPATIENT
Start: 2024-08-01 | End: 2024-08-01

## 2024-08-01 RX ORDER — AZACITIDINE 100 MG/1
75 INJECTION, POWDER, LYOPHILIZED, FOR SOLUTION INTRAVENOUS; SUBCUTANEOUS
Status: COMPLETED | OUTPATIENT
Start: 2024-08-01 | End: 2024-08-01

## 2024-08-01 RX ADMIN — AZACITIDINE 140 MG: 100 INJECTION, POWDER, LYOPHILIZED, FOR SOLUTION INTRAVENOUS; SUBCUTANEOUS at 10:08

## 2024-08-01 RX ADMIN — ONDANSETRON HYDROCHLORIDE 16 MG: 8 TABLET, FILM COATED ORAL at 09:08

## 2024-08-02 ENCOUNTER — INFUSION (OUTPATIENT)
Dept: INFUSION THERAPY | Facility: HOSPITAL | Age: 69
End: 2024-08-02
Attending: INTERNAL MEDICINE
Payer: MEDICARE

## 2024-08-02 ENCOUNTER — CLINICAL SUPPORT (OUTPATIENT)
Dept: REHABILITATION | Facility: HOSPITAL | Age: 69
End: 2024-08-02
Payer: MEDICARE

## 2024-08-02 VITALS
RESPIRATION RATE: 18 BRPM | OXYGEN SATURATION: 100 % | HEART RATE: 61 BPM | WEIGHT: 163.38 LBS | HEIGHT: 68 IN | SYSTOLIC BLOOD PRESSURE: 115 MMHG | BODY MASS INDEX: 24.76 KG/M2 | TEMPERATURE: 98 F | DIASTOLIC BLOOD PRESSURE: 61 MMHG

## 2024-08-02 DIAGNOSIS — D64.9 SYMPTOMATIC ANEMIA: ICD-10-CM

## 2024-08-02 DIAGNOSIS — D46.9 MYELODYSPLASTIC SYNDROME: Primary | ICD-10-CM

## 2024-08-02 DIAGNOSIS — R68.89 DECREASED ACTIVITY TOLERANCE: Primary | ICD-10-CM

## 2024-08-02 DIAGNOSIS — D61.818 PANCYTOPENIA: ICD-10-CM

## 2024-08-02 PROCEDURE — 97530 THERAPEUTIC ACTIVITIES: CPT | Mod: PN,CQ

## 2024-08-02 PROCEDURE — 63600175 PHARM REV CODE 636 W HCPCS: Performed by: INTERNAL MEDICINE

## 2024-08-02 PROCEDURE — 97110 THERAPEUTIC EXERCISES: CPT | Mod: PN,CQ

## 2024-08-02 PROCEDURE — 25000003 PHARM REV CODE 250: Performed by: INTERNAL MEDICINE

## 2024-08-02 PROCEDURE — P9040 RBC LEUKOREDUCED IRRADIATED: HCPCS | Performed by: INTERNAL MEDICINE

## 2024-08-02 RX ORDER — ACETAMINOPHEN 325 MG/1
650 TABLET ORAL
Status: DISCONTINUED | OUTPATIENT
Start: 2024-08-02 | End: 2024-08-02 | Stop reason: HOSPADM

## 2024-08-02 RX ORDER — HYDROCODONE BITARTRATE AND ACETAMINOPHEN 500; 5 MG/1; MG/1
TABLET ORAL ONCE
Status: COMPLETED | OUTPATIENT
Start: 2024-08-02 | End: 2024-08-02

## 2024-08-02 RX ORDER — ONDANSETRON HYDROCHLORIDE 8 MG/1
16 TABLET, FILM COATED ORAL
Status: COMPLETED | OUTPATIENT
Start: 2024-08-02 | End: 2024-08-02

## 2024-08-02 RX ORDER — AZACITIDINE 100 MG/1
75 INJECTION, POWDER, LYOPHILIZED, FOR SOLUTION INTRAVENOUS; SUBCUTANEOUS
Status: COMPLETED | OUTPATIENT
Start: 2024-08-02 | End: 2024-08-02

## 2024-08-02 RX ORDER — HYDROCODONE BITARTRATE AND ACETAMINOPHEN 500; 5 MG/1; MG/1
TABLET ORAL ONCE
Status: DISCONTINUED | OUTPATIENT
Start: 2024-08-02 | End: 2024-08-02 | Stop reason: HOSPADM

## 2024-08-02 RX ADMIN — SODIUM CHLORIDE: 0.9 INJECTION, SOLUTION INTRAVENOUS at 10:08

## 2024-08-02 RX ADMIN — ONDANSETRON HYDROCHLORIDE 16 MG: 8 TABLET, FILM COATED ORAL at 10:08

## 2024-08-02 RX ADMIN — AZACITIDINE 140 MG: 100 INJECTION, POWDER, LYOPHILIZED, FOR SOLUTION INTRAVENOUS; SUBCUTANEOUS at 11:08

## 2024-08-02 NOTE — PROGRESS NOTES
[facility]   Physical Therapy Treatment Note      Name: Guillaume Salinas  Clinic Number: [id number]    Therapy Diagnosis:   Encounter Diagnosis   Name Primary?    Decreased activity tolerance Yes       Physician: Mohit Hickey MD    Visit Date: 8/2/2024    Physician Orders: PT Eval and Treat   Medical Diagnosis from Referral: Myelodysplastic syndrome [D46.9], Stem cell transplant candidate [Z76.82], Physical deconditioning [R53.81]   Evaluation Date: 7/11/2024  Authorization Period Expiration: 6/21/2025  Plan of Care Expiration: 8/23/2024  Progress Note Due: 8/11/2024  Date of Surgery: N/A  Visit # / Visits authorized: 6/20 + eval  FOTO: 1/3     Precautions: Standard and cancer; AMN  utilized throughout visit; consider holding or modifying session to seated exercise if hemoglobin numbers continue to trend low     Time In: 1:52 PM  Time Out: 2:30 PM  Total Billable Time: 38 minutes (2TA + 1 TE)    PTA Visit #: 1/5     Subjective     Patient reports: Continues to be tired, arrived late today because his infusions ran over.  He knows his hemoglobin numbers continue to be low but he wants to try therapy anyway to see what he can tolerate.   He  was  compliant with home exercise program. Has done home exercise program 2 days.   Response to previous treatment: No adverse response  Functional change: Ongoing    Pain: 0/10  Location: N/A      Objective      Objective Measures updated at progress report unless specified.     Vitals Mid-Session (after 1st farmer's carry)  - HR: 103bpm  - O2: 99%    Vitals Mid-Session (after 2nd farmer's carry)  - HR: 109bpm  - O2: 99%    Treatment     Guillaume received the treatments listed below:      therapeutic exercises to develop strength and endurance for 15 minutes including:  - standing hip abduction x15 B green theraband (could not tolerate additional reps)   - Standing hip extension 2x15 B green theraband    - Standing calf  "raises 2x15 - no support   - standing toe raises 2 x 15   - Session ended on stepper x 8 minutes level 4 progressive intervals     therapeutic activities to improve functional performance for 23 minutes, including:  - Medical monitoring (see above)  - Began session on treadmill x 8 minutes 1.7 speed; incline level 1   - Lateral tony (mixed 6" and 9") step overs x 6 lengths x 10 feet each with 1.5# ankle weights  - Forward/reciprocal tony (mixed 6" and 9") step overs x 6 lengths x 10 feet each with 1.5# ankle weights  - Step up with contralateral hip : 2x15; 6" step    Circuit x2  - farmer's carries with 6# weights x 300'  - sit to stand from elevated mat + 6# dumbbell shoulder press x10      Patient Education and Home Exercises       Education provided:   - HEP instruction    Written Home Exercises Provided: yes. Exercises were reviewed and Guillaume was able to demonstrate them prior to the end of the session.  Guillaume demonstrated good  understanding of the education provided. See Electronic Medical Record under Patient Instructions for exercises provided during therapy sessions.    Assessment     Guillaume arrived to session late following infusion earlier today but was agreeable to treatment.  Despite high levels of fatigue and continued low hemoglobin numbers he was insistent on attempting treatment.  Session was modified to accommodate his current status with frequent rest breaks provided and appropriate levels of fatigue achieved.  Continue to monitor patient status and hemoglobin numbers to consider putting PT on hold.    Guillaume Is progressing well towards his goals.   Patient prognosis is Good.     Patient will continue to benefit from skilled outpatient physical therapy to address the deficits listed in the problem list box on initial evaluation, provide pt/family education and to maximize pt's level of independence in the home and community environment.     Patient's spiritual, cultural and educational " needs considered and pt agreeable to plan of care and goals.     Anticipated barriers to physical therapy: Co-morbidities     Goals:     Short Term Goals: 3 weeks   Patient will be compliant with HEP in order to maximize PT benefits (met)  Patient will score >/= 55% on FOTO survey in order to improve self-perception of functional mobility deficits (progressing, not met)  Patient will walk >/= 1300 feet on Six-Minute Walk Test in order to improve endurance for community mobility (progressing, not met)     Long Term Goals: 6 weeks   Patient will score >/= 65% on FOTO survey in order to improve self-perception of functional mobility deficits (progressing, not met)  Patient will walk >/= 1400 feet on Six-Minute Walk Test in order to improve endurance for community mobility (progressing, not met)  Patient will score >/= 13 repetitions on 30-Second Sit to  order to improve bilateral lower extremity endurance and muscular power for transfers (progressing, not met)  Patient will begin some form of home/community fitness in order to sustain progress gained in PT (progressing, not met)    Plan     Continue to progress as per plan of care. Check CBC on Thursday.    Eulalia Krueger, PTA

## 2024-08-02 NOTE — NURSING
Patient tolerated Vidaza SQ injection and 1 unit of PRBCs. Patient reinforced on side effects and when to contact the doctor. PIV flushed and discontinued. D/C with next appt scheduled.

## 2024-08-05 ENCOUNTER — OFFICE VISIT (OUTPATIENT)
Dept: HEMATOLOGY/ONCOLOGY | Facility: CLINIC | Age: 69
End: 2024-08-05
Payer: MEDICARE

## 2024-08-05 ENCOUNTER — TELEPHONE (OUTPATIENT)
Dept: HEMATOLOGY/ONCOLOGY | Facility: CLINIC | Age: 69
End: 2024-08-05

## 2024-08-05 ENCOUNTER — LAB VISIT (OUTPATIENT)
Dept: LAB | Facility: HOSPITAL | Age: 69
End: 2024-08-05
Payer: MEDICARE

## 2024-08-05 ENCOUNTER — PATIENT MESSAGE (OUTPATIENT)
Dept: PALLIATIVE MEDICINE | Facility: CLINIC | Age: 69
End: 2024-08-05
Payer: MEDICARE

## 2024-08-05 VITALS
HEIGHT: 68 IN | TEMPERATURE: 98 F | SYSTOLIC BLOOD PRESSURE: 101 MMHG | HEART RATE: 81 BPM | BODY MASS INDEX: 24.81 KG/M2 | OXYGEN SATURATION: 99 % | RESPIRATION RATE: 20 BRPM | WEIGHT: 163.69 LBS | DIASTOLIC BLOOD PRESSURE: 57 MMHG

## 2024-08-05 DIAGNOSIS — D46.9 MYELODYSPLASTIC SYNDROME: Primary | ICD-10-CM

## 2024-08-05 DIAGNOSIS — D84.81 IMMUNODEFICIENCY SECONDARY TO NEOPLASM: ICD-10-CM

## 2024-08-05 DIAGNOSIS — Z76.82 STEM CELL TRANSPLANT CANDIDATE: ICD-10-CM

## 2024-08-05 DIAGNOSIS — D49.9 IMMUNODEFICIENCY SECONDARY TO NEOPLASM: ICD-10-CM

## 2024-08-05 DIAGNOSIS — D46.9 MYELODYSPLASTIC SYNDROME: ICD-10-CM

## 2024-08-05 DIAGNOSIS — D61.818 PANCYTOPENIA: ICD-10-CM

## 2024-08-05 DIAGNOSIS — G47.00 INSOMNIA, UNSPECIFIED TYPE: ICD-10-CM

## 2024-08-05 DIAGNOSIS — D64.9 SYMPTOMATIC ANEMIA: ICD-10-CM

## 2024-08-05 LAB
ABO + RH BLD: NORMAL
ALBUMIN SERPL BCP-MCNC: 3.8 G/DL (ref 3.5–5.2)
ALP SERPL-CCNC: 97 U/L (ref 55–135)
ALT SERPL W/O P-5'-P-CCNC: 28 U/L (ref 10–44)
ANION GAP SERPL CALC-SCNC: 7 MMOL/L (ref 8–16)
ANISOCYTOSIS BLD QL SMEAR: SLIGHT
AST SERPL-CCNC: 22 U/L (ref 10–40)
BASOPHILS # BLD AUTO: 0 K/UL (ref 0–0.2)
BASOPHILS NFR BLD: 0 % (ref 0–1.9)
BILIRUB SERPL-MCNC: 0.5 MG/DL (ref 0.1–1)
BLD GP AB SCN CELLS X3 SERPL QL: NORMAL
BUN SERPL-MCNC: 17 MG/DL (ref 8–23)
CALCIUM SERPL-MCNC: 9.9 MG/DL (ref 8.7–10.5)
CHLORIDE SERPL-SCNC: 107 MMOL/L (ref 95–110)
CO2 SERPL-SCNC: 26 MMOL/L (ref 23–29)
CREAT SERPL-MCNC: 1.2 MG/DL (ref 0.5–1.4)
DIFFERENTIAL METHOD BLD: ABNORMAL
EOSINOPHIL # BLD AUTO: 0 K/UL (ref 0–0.5)
EOSINOPHIL NFR BLD: 0 % (ref 0–8)
ERYTHROCYTE [DISTWIDTH] IN BLOOD BY AUTOMATED COUNT: 12.9 % (ref 11.5–14.5)
EST. GFR  (NO RACE VARIABLE): >60 ML/MIN/1.73 M^2
GLUCOSE SERPL-MCNC: 139 MG/DL (ref 70–110)
HCT VFR BLD AUTO: 24.7 % (ref 40–54)
HGB BLD-MCNC: 8 G/DL (ref 14–18)
IMM GRANULOCYTES # BLD AUTO: 0 K/UL (ref 0–0.04)
IMM GRANULOCYTES NFR BLD AUTO: 0 % (ref 0–0.5)
LYMPHOCYTES # BLD AUTO: 0.8 K/UL (ref 1–4.8)
LYMPHOCYTES NFR BLD: 74.8 % (ref 18–48)
MAGNESIUM SERPL-MCNC: 2.3 MG/DL (ref 1.6–2.6)
MCH RBC QN AUTO: 29.2 PG (ref 27–31)
MCHC RBC AUTO-ENTMCNC: 32.4 G/DL (ref 32–36)
MCV RBC AUTO: 90 FL (ref 82–98)
MONOCYTES # BLD AUTO: 0 K/UL (ref 0.3–1)
MONOCYTES NFR BLD: 1.9 % (ref 4–15)
NEUTROPHILS # BLD AUTO: 0.3 K/UL (ref 1.8–7.7)
NEUTROPHILS NFR BLD: 23.3 % (ref 38–73)
NRBC BLD-RTO: 0 /100 WBC
PHOSPHATE SERPL-MCNC: 3.1 MG/DL (ref 2.7–4.5)
PLATELET # BLD AUTO: 14 K/UL (ref 150–450)
PLATELET BLD QL SMEAR: ABNORMAL
PMV BLD AUTO: 12 FL (ref 9.2–12.9)
POTASSIUM SERPL-SCNC: 4.3 MMOL/L (ref 3.5–5.1)
PROT SERPL-MCNC: 7.2 G/DL (ref 6–8.4)
RBC # BLD AUTO: 2.74 M/UL (ref 4.6–6.2)
SODIUM SERPL-SCNC: 140 MMOL/L (ref 136–145)
SPECIMEN OUTDATE: NORMAL
WBC # BLD AUTO: 1.07 K/UL (ref 3.9–12.7)

## 2024-08-05 PROCEDURE — 84100 ASSAY OF PHOSPHORUS: CPT | Performed by: INTERNAL MEDICINE

## 2024-08-05 PROCEDURE — 80053 COMPREHEN METABOLIC PANEL: CPT | Performed by: INTERNAL MEDICINE

## 2024-08-05 PROCEDURE — 86900 BLOOD TYPING SEROLOGIC ABO: CPT | Mod: NBTX | Performed by: INTERNAL MEDICINE

## 2024-08-05 PROCEDURE — 99999 PR PBB SHADOW E&M-EST. PATIENT-LVL IV: CPT | Mod: PBBFAC,,, | Performed by: INTERNAL MEDICINE

## 2024-08-05 PROCEDURE — 36415 COLL VENOUS BLD VENIPUNCTURE: CPT | Performed by: INTERNAL MEDICINE

## 2024-08-05 PROCEDURE — 86850 RBC ANTIBODY SCREEN: CPT | Performed by: INTERNAL MEDICINE

## 2024-08-05 PROCEDURE — 85025 COMPLETE CBC W/AUTO DIFF WBC: CPT | Performed by: INTERNAL MEDICINE

## 2024-08-05 PROCEDURE — 86901 BLOOD TYPING SEROLOGIC RH(D): CPT | Performed by: INTERNAL MEDICINE

## 2024-08-05 PROCEDURE — 83735 ASSAY OF MAGNESIUM: CPT | Mod: NBTX | Performed by: INTERNAL MEDICINE

## 2024-08-05 RX ORDER — TRAZODONE HYDROCHLORIDE 50 MG/1
50 TABLET ORAL NIGHTLY PRN
Qty: 30 TABLET | Refills: 11 | Status: SHIPPED | OUTPATIENT
Start: 2024-08-05 | End: 2025-08-05

## 2024-08-05 RX ORDER — LORAZEPAM 1 MG/1
1 TABLET ORAL EVERY 12 HOURS PRN
Qty: 2 TABLET | Refills: 0 | Status: SHIPPED | OUTPATIENT
Start: 2024-08-05 | End: 2024-08-05

## 2024-08-07 ENCOUNTER — PATIENT MESSAGE (OUTPATIENT)
Dept: HEMATOLOGY/ONCOLOGY | Facility: CLINIC | Age: 69
End: 2024-08-07
Payer: MEDICARE

## 2024-08-07 DIAGNOSIS — K20.90 ESOPHAGITIS: ICD-10-CM

## 2024-08-07 RX ORDER — PANTOPRAZOLE SODIUM 40 MG/1
40 TABLET, DELAYED RELEASE ORAL DAILY
Qty: 30 TABLET | Refills: 3 | Status: SHIPPED | OUTPATIENT
Start: 2024-08-07 | End: 2024-12-05

## 2024-08-08 ENCOUNTER — LAB VISIT (OUTPATIENT)
Dept: LAB | Facility: HOSPITAL | Age: 69
End: 2024-08-08
Attending: INTERNAL MEDICINE
Payer: MEDICARE

## 2024-08-08 ENCOUNTER — TELEPHONE (OUTPATIENT)
Dept: FAMILY MEDICINE | Facility: HOSPITAL | Age: 69
End: 2024-08-08
Payer: MEDICARE

## 2024-08-08 DIAGNOSIS — D61.818 PANCYTOPENIA: Primary | ICD-10-CM

## 2024-08-08 DIAGNOSIS — Z76.82 STEM CELL TRANSPLANT CANDIDATE: ICD-10-CM

## 2024-08-08 DIAGNOSIS — D46.9 MYELODYSPLASTIC SYNDROME: ICD-10-CM

## 2024-08-08 LAB
ABO + RH BLD: NORMAL
ALBUMIN SERPL BCP-MCNC: 3.8 G/DL (ref 3.5–5.2)
ALP SERPL-CCNC: 84 U/L (ref 55–135)
ALT SERPL W/O P-5'-P-CCNC: 26 U/L (ref 10–44)
ANION GAP SERPL CALC-SCNC: 13 MMOL/L (ref 8–16)
ANISOCYTOSIS BLD QL SMEAR: SLIGHT
AST SERPL-CCNC: 21 U/L (ref 10–40)
BASOPHILS # BLD AUTO: 0 K/UL (ref 0–0.2)
BASOPHILS NFR BLD: 0 % (ref 0–1.9)
BILIRUB SERPL-MCNC: 0.4 MG/DL (ref 0.1–1)
BLD GP AB SCN CELLS X3 SERPL QL: NORMAL
BUN SERPL-MCNC: 17 MG/DL (ref 8–23)
CALCIUM SERPL-MCNC: 9.5 MG/DL (ref 8.7–10.5)
CHLORIDE SERPL-SCNC: 107 MMOL/L (ref 95–110)
CO2 SERPL-SCNC: 23 MMOL/L (ref 23–29)
CREAT SERPL-MCNC: 1 MG/DL (ref 0.5–1.4)
DIFFERENTIAL METHOD BLD: ABNORMAL
EOSINOPHIL # BLD AUTO: 0 K/UL (ref 0–0.5)
EOSINOPHIL NFR BLD: 0 % (ref 0–8)
ERYTHROCYTE [DISTWIDTH] IN BLOOD BY AUTOMATED COUNT: 13 % (ref 11.5–14.5)
EST. GFR  (NO RACE VARIABLE): >60 ML/MIN/1.73 M^2
GLUCOSE SERPL-MCNC: 121 MG/DL (ref 70–110)
HCT VFR BLD AUTO: 21.7 % (ref 40–54)
HGB BLD-MCNC: 7.2 G/DL (ref 14–18)
HYPOCHROMIA BLD QL SMEAR: ABNORMAL
IMM GRANULOCYTES # BLD AUTO: 0 K/UL (ref 0–0.04)
IMM GRANULOCYTES NFR BLD AUTO: 0 % (ref 0–0.5)
INR PPP: 1 (ref 0.8–1.2)
LYMPHOCYTES # BLD AUTO: 0.9 K/UL (ref 1–4.8)
LYMPHOCYTES NFR BLD: 86.1 % (ref 18–48)
MAGNESIUM SERPL-MCNC: 2 MG/DL (ref 1.6–2.6)
MCH RBC QN AUTO: 29 PG (ref 27–31)
MCHC RBC AUTO-ENTMCNC: 33.2 G/DL (ref 32–36)
MCV RBC AUTO: 88 FL (ref 82–98)
MONOCYTES # BLD AUTO: 0 K/UL (ref 0.3–1)
MONOCYTES NFR BLD: 0.9 % (ref 4–15)
NEUTROPHILS # BLD AUTO: 0.1 K/UL (ref 1.8–7.7)
NEUTROPHILS NFR BLD: 13 % (ref 38–73)
NRBC BLD-RTO: 0 /100 WBC
PHOSPHATE SERPL-MCNC: 4.2 MG/DL (ref 2.7–4.5)
PLATELET # BLD AUTO: 11 K/UL (ref 150–450)
PLATELET BLD QL SMEAR: ABNORMAL
PMV BLD AUTO: 12.2 FL (ref 9.2–12.9)
POTASSIUM SERPL-SCNC: 4.2 MMOL/L (ref 3.5–5.1)
PROT SERPL-MCNC: 6.9 G/DL (ref 6–8.4)
PROTHROMBIN TIME: 10.9 SEC (ref 9–12.5)
RBC # BLD AUTO: 2.48 M/UL (ref 4.6–6.2)
SODIUM SERPL-SCNC: 143 MMOL/L (ref 136–145)
SPECIMEN OUTDATE: NORMAL
WBC # BLD AUTO: 1.08 K/UL (ref 3.9–12.7)

## 2024-08-08 PROCEDURE — 80053 COMPREHEN METABOLIC PANEL: CPT | Performed by: INTERNAL MEDICINE

## 2024-08-08 PROCEDURE — 84100 ASSAY OF PHOSPHORUS: CPT | Performed by: INTERNAL MEDICINE

## 2024-08-08 PROCEDURE — 86850 RBC ANTIBODY SCREEN: CPT | Performed by: INTERNAL MEDICINE

## 2024-08-08 PROCEDURE — 86901 BLOOD TYPING SEROLOGIC RH(D): CPT | Performed by: INTERNAL MEDICINE

## 2024-08-08 PROCEDURE — 85610 PROTHROMBIN TIME: CPT | Performed by: INTERNAL MEDICINE

## 2024-08-08 PROCEDURE — 36415 COLL VENOUS BLD VENIPUNCTURE: CPT | Performed by: INTERNAL MEDICINE

## 2024-08-08 PROCEDURE — 83735 ASSAY OF MAGNESIUM: CPT | Performed by: INTERNAL MEDICINE

## 2024-08-08 PROCEDURE — 86900 BLOOD TYPING SEROLOGIC ABO: CPT | Performed by: INTERNAL MEDICINE

## 2024-08-08 PROCEDURE — 85025 COMPLETE CBC W/AUTO DIFF WBC: CPT | Performed by: INTERNAL MEDICINE

## 2024-08-08 RX ORDER — HYDROCODONE BITARTRATE AND ACETAMINOPHEN 500; 5 MG/1; MG/1
TABLET ORAL ONCE
OUTPATIENT
Start: 2024-08-08 | End: 2024-08-08

## 2024-08-08 RX ORDER — ACETAMINOPHEN 325 MG/1
650 TABLET ORAL
OUTPATIENT
Start: 2024-08-08

## 2024-08-09 ENCOUNTER — PATIENT MESSAGE (OUTPATIENT)
Dept: HEMATOLOGY/ONCOLOGY | Facility: CLINIC | Age: 69
End: 2024-08-09
Payer: MEDICARE

## 2024-08-09 ENCOUNTER — LAB VISIT (OUTPATIENT)
Dept: LAB | Facility: HOSPITAL | Age: 69
End: 2024-08-09
Attending: INTERNAL MEDICINE
Payer: MEDICARE

## 2024-08-09 ENCOUNTER — OFFICE VISIT (OUTPATIENT)
Dept: HOME HEALTH SERVICES | Facility: CLINIC | Age: 69
End: 2024-08-09
Payer: MEDICARE

## 2024-08-09 ENCOUNTER — TELEPHONE (OUTPATIENT)
Dept: INFUSION THERAPY | Facility: HOSPITAL | Age: 69
End: 2024-08-09
Payer: MEDICARE

## 2024-08-09 VITALS
TEMPERATURE: 97 F | OXYGEN SATURATION: 98 % | RESPIRATION RATE: 16 BRPM | SYSTOLIC BLOOD PRESSURE: 130 MMHG | DIASTOLIC BLOOD PRESSURE: 60 MMHG | HEART RATE: 90 BPM | BODY MASS INDEX: 24.55 KG/M2 | WEIGHT: 162 LBS | HEIGHT: 68 IN

## 2024-08-09 DIAGNOSIS — E78.2 MIXED HYPERLIPIDEMIA: ICD-10-CM

## 2024-08-09 DIAGNOSIS — D53.9 MACROCYTIC ANEMIA: ICD-10-CM

## 2024-08-09 DIAGNOSIS — Z00.00 ENCOUNTER FOR PREVENTIVE HEALTH EXAMINATION: Primary | ICD-10-CM

## 2024-08-09 DIAGNOSIS — I73.9 PERIPHERAL VASCULAR DISEASE: ICD-10-CM

## 2024-08-09 DIAGNOSIS — D69.6 THROMBOCYTOPENIA: ICD-10-CM

## 2024-08-09 DIAGNOSIS — D46.9 MYELODYSPLASTIC SYNDROME: Primary | ICD-10-CM

## 2024-08-09 DIAGNOSIS — D46.9 MYELODYSPLASTIC SYNDROME: ICD-10-CM

## 2024-08-09 DIAGNOSIS — Z00.00 ENCOUNTER FOR MEDICARE ANNUAL WELLNESS EXAM: ICD-10-CM

## 2024-08-09 DIAGNOSIS — D61.818 PANCYTOPENIA: ICD-10-CM

## 2024-08-09 DIAGNOSIS — C95.90 LEUKEMIA NOT HAVING ACHIEVED REMISSION, UNSPECIFIED LEUKEMIA TYPE: ICD-10-CM

## 2024-08-09 DIAGNOSIS — I77.1 ILIAC ARTERY STENOSIS, RIGHT: Chronic | ICD-10-CM

## 2024-08-09 DIAGNOSIS — N18.31 CHRONIC KIDNEY DISEASE, STAGE 3A: ICD-10-CM

## 2024-08-09 DIAGNOSIS — J43.9 PULMONARY EMPHYSEMA, UNSPECIFIED EMPHYSEMA TYPE: ICD-10-CM

## 2024-08-09 DIAGNOSIS — T45.1X5A CHEMOTHERAPY-INDUCED NEUTROPENIA: ICD-10-CM

## 2024-08-09 DIAGNOSIS — E53.8 B12 DEFICIENCY: ICD-10-CM

## 2024-08-09 DIAGNOSIS — I10 HYPERTENSION, ESSENTIAL: ICD-10-CM

## 2024-08-09 DIAGNOSIS — I25.10 CORONARY ARTERY DISEASE INVOLVING NATIVE CORONARY ARTERY OF NATIVE HEART WITHOUT ANGINA PECTORIS: ICD-10-CM

## 2024-08-09 DIAGNOSIS — I70.0 AORTIC ATHEROSCLEROSIS: ICD-10-CM

## 2024-08-09 DIAGNOSIS — D70.1 CHEMOTHERAPY-INDUCED NEUTROPENIA: ICD-10-CM

## 2024-08-09 DIAGNOSIS — I70.213 ATHEROSCLEROSIS OF NATIVE ARTERY OF BOTH LOWER EXTREMITIES WITH INTERMITTENT CLAUDICATION: ICD-10-CM

## 2024-08-09 LAB
ABO + RH BLD: NORMAL
BLD GP AB SCN CELLS X3 SERPL QL: NORMAL
SPECIMEN OUTDATE: NORMAL

## 2024-08-09 PROCEDURE — 86850 RBC ANTIBODY SCREEN: CPT | Mod: 91 | Performed by: INTERNAL MEDICINE

## 2024-08-09 PROCEDURE — 86900 BLOOD TYPING SEROLOGIC ABO: CPT | Mod: 91 | Performed by: INTERNAL MEDICINE

## 2024-08-09 PROCEDURE — 86901 BLOOD TYPING SEROLOGIC RH(D): CPT | Performed by: INTERNAL MEDICINE

## 2024-08-09 PROCEDURE — 36415 COLL VENOUS BLD VENIPUNCTURE: CPT | Performed by: INTERNAL MEDICINE

## 2024-08-09 PROCEDURE — 86850 RBC ANTIBODY SCREEN: CPT | Performed by: INTERNAL MEDICINE

## 2024-08-09 PROCEDURE — 86900 BLOOD TYPING SEROLOGIC ABO: CPT | Performed by: INTERNAL MEDICINE

## 2024-08-09 PROCEDURE — 86901 BLOOD TYPING SEROLOGIC RH(D): CPT | Mod: 91 | Performed by: INTERNAL MEDICINE

## 2024-08-09 PROCEDURE — 86920 COMPATIBILITY TEST SPIN: CPT | Performed by: INTERNAL MEDICINE

## 2024-08-09 NOTE — PROGRESS NOTES
"Guillaume Salinas presented for an initial Medicare AWV today. The following components were reviewed and updated:    Medical history  Family History  Social history  Allergies and Current Medications  Health Risk Assessment  Health Maintenance  Care Team    **See Completed Assessments for Annual Wellness visit with in the encounter summary    The following assessments were completed:  Depression Screening  Cognitive function Screening: Declines  Timed Get Up Test  Whisper Test      Opioid documentation:      Patient does not have a current opioid prescription.          Vitals:    08/09/24 0951   BP: 130/60   BP Location: Left arm   Patient Position: Sitting   Pulse: 90   Resp: 16   Temp: 97 °F (36.1 °C)   SpO2: 98%   Weight: 73.5 kg (162 lb)   Height: 5' 8" (1.727 m)     Body mass index is 24.63 kg/m².       Physical Exam  Vitals reviewed.   Constitutional:       General: He is not in acute distress.     Appearance: Normal appearance.   HENT:      Head: Normocephalic.   Cardiovascular:      Rate and Rhythm: Normal rate and regular rhythm.      Pulses: Normal pulses.      Heart sounds: Normal heart sounds.   Pulmonary:      Effort: Pulmonary effort is normal. No respiratory distress.      Breath sounds: Normal breath sounds. No wheezing.   Abdominal:      General: Bowel sounds are normal.      Tenderness: There is no abdominal tenderness.   Musculoskeletal:         General: Normal range of motion.      Cervical back: Normal range of motion.      Right lower leg: No edema.      Left lower leg: No edema.   Skin:     General: Skin is warm and dry.      Capillary Refill: Capillary refill takes less than 2 seconds.   Neurological:      General: No focal deficit present.      Mental Status: He is alert and oriented to person, place, and time.   Psychiatric:         Mood and Affect: Mood normal.         Behavior: Behavior normal.           Diagnoses and health risks identified today and associated " recommendations/orders:    1. Encounter for preventive health examination  Assessments completed.  HM recommendations reviewed.  F/u with PCP as instructed.      2. Chronic kidney disease, stage 3a  Chronic, stable on current regimen. Followed by PCP.     3. Pulmonary emphysema, unspecified emphysema type  Chronic, stable on current regimen. Followed by PCP.     4. Aortic atherosclerosis  Chronic, stable on current regimen. Not on statin. Followed by PCP.     5. Peripheral vascular disease  Chronic, stable on current regimen. Followed by PCP.     6. Atherosclerosis of native artery of both lower extremities with intermittent claudication  Chronic, stable on current regimen. Followed by PCP.     7. Iliac artery stenosis, right  Chronic, stable on current regimen. Followed by PCP.     8. Pancytopenia  Chronic, stable on current regimen. Followed by Hem/Onc.     9. Thrombocytopenia  Chronic, stable on current regimen. Followed by Hem/Onc.     10. Myelodysplastic syndrome  Chronic, stable on current regimen. Followed by PCP.     11. Leukemia not having achieved remission, unspecified leukemia type  Chronic, stable on current  Venclexta regimen. Followed by Hem/Onc.     12. Hypertension, essential  Chronic, stable on current Coreg regimen. Followed by PCP.     13. Mixed hyperlipidemia  Chronic, stable on current regimen. Not on statin. Followed by PCP.     14. Coronary artery disease involving native coronary artery of native heart without angina pectoris  Chronic, stable on current regimen. Followed by PCP.     15. Macrocytic anemia  Chronic, stable on current regimen. Followed by PCP.   Lab Results   Component Value Date    WBC 1.08 (LL) 08/08/2024    HGB 7.2 (L) 08/08/2024    HCT 21.7 (L) 08/08/2024    MCV 88 08/08/2024    PLT 11 (LL) 08/08/2024     16. Chemotherapy-induced neutropenia  Chronic, stable on current regimen. Followed by Hem/Onc.     17. B12 deficiency  Chronic, stable on current regimen. Followed by PCP.      18. Encounter for Medicare annual wellness exam  Referred by [facility].   - Ambulatory Referral/Consult to Enhanced Annual Wellness Visit (eAWV)      Provided Guillaume with a 5-10 year written screening schedule and personal prevention plan. Recommendations were developed using the USPSTF age appropriate recommendations. Education, counseling, and referrals were provided as needed.  After Visit Summary printed and given to patient which includes a list of additional screenings\tests needed.    Follow up in about 1 year (around 8/9/2025) for Medicare AWV.      Dixie Jacobson NP    I offered to discuss advanced care planning, including how to pick a person who would make decisions for you if you were unable to make them for yourself, called a health care power of , and what kind of decisions you might make such as use of life sustaining treatments such as ventilators and tube feeding when faced with a life limiting illness recorded on a living will that they will need to know. (How you want to be cared for as you near the end of your natural life)     X Patient is interested in learning more about how to make advanced directives.  I provided them paperwork and offered to discuss this with them.

## 2024-08-09 NOTE — PATIENT INSTRUCTIONS
Counseling and Referral of Other Preventative  (Italic type indicates deductible and co-insurance are waived)    Patient Name: Guillaume Salinas  Today's Date: 8/9/2024    Health Maintenance       Date Due Completion Date    TETANUS VACCINE Never done ---    Aspirin/Antiplatelet Therapy Never done ---    Shingles Vaccine (1 of 2) Never done ---    High Dose Statin Never done ---    COVID-19 Vaccine (5 - 2023-24 season) 01/03/2024 11/8/2023    Influenza Vaccine (1) 09/01/2024 11/8/2023    Colorectal Cancer Screening 01/05/2025 1/5/2022    Lipid Panel 04/17/2028 4/17/2023        No orders of the defined types were placed in this encounter.      The following information is provided to all patients.  This information is to help you find resources for any of the problems found today that may be affecting your health:                  Living healthy guide: www.[facility].[place].[facility]      Understanding Diabetes: www.diabetes.org      Eating healthy: www.cdc.gov/healthyweight      CDC home safety checklist: www.cdc.gov/steadi/patient.html      Agency on Aging: www.goea.[place].[facility]      Alcoholics anonymous (AA): www.aa.org      Physical Activity: www.susanne.nih.gov/bt4uaol      Tobacco use: www.quitwithusla.org

## 2024-08-10 PROBLEM — J43.9 EMPHYSEMA LUNG: Status: ACTIVE | Noted: 2024-08-10

## 2024-08-10 PROBLEM — T45.1X5A CHEMOTHERAPY-INDUCED NEUTROPENIA: Status: ACTIVE | Noted: 2024-04-05

## 2024-08-10 PROBLEM — D70.1 CHEMOTHERAPY-INDUCED NEUTROPENIA: Status: ACTIVE | Noted: 2024-04-05

## 2024-08-12 ENCOUNTER — INFUSION (OUTPATIENT)
Dept: INFUSION THERAPY | Facility: HOSPITAL | Age: 69
End: 2024-08-12
Attending: INTERNAL MEDICINE
Payer: MEDICARE

## 2024-08-12 ENCOUNTER — OFFICE VISIT (OUTPATIENT)
Dept: PALLIATIVE MEDICINE | Facility: CLINIC | Age: 69
End: 2024-08-12
Payer: MEDICARE

## 2024-08-12 VITALS
SYSTOLIC BLOOD PRESSURE: 132 MMHG | HEART RATE: 69 BPM | RESPIRATION RATE: 17 BRPM | TEMPERATURE: 98 F | OXYGEN SATURATION: 99 % | DIASTOLIC BLOOD PRESSURE: 65 MMHG

## 2024-08-12 VITALS
BODY MASS INDEX: 25.34 KG/M2 | OXYGEN SATURATION: 98 % | DIASTOLIC BLOOD PRESSURE: 71 MMHG | WEIGHT: 166.69 LBS | HEART RATE: 85 BPM | SYSTOLIC BLOOD PRESSURE: 123 MMHG

## 2024-08-12 DIAGNOSIS — M79.605 PAIN IN BOTH LOWER EXTREMITIES: ICD-10-CM

## 2024-08-12 DIAGNOSIS — R53.0 NEOPLASTIC (MALIGNANT) RELATED FATIGUE: ICD-10-CM

## 2024-08-12 DIAGNOSIS — Z91.89 ADJUSTMENT TO LIFE THREATENING ILLNESS: ICD-10-CM

## 2024-08-12 DIAGNOSIS — M79.604 PAIN IN BOTH LOWER EXTREMITIES: ICD-10-CM

## 2024-08-12 DIAGNOSIS — R10.9 ABDOMINAL PAIN, UNSPECIFIED ABDOMINAL LOCATION: ICD-10-CM

## 2024-08-12 DIAGNOSIS — Z51.5 ENCOUNTER FOR PALLIATIVE CARE: ICD-10-CM

## 2024-08-12 DIAGNOSIS — Z71.89 ADVANCED CARE PLANNING/COUNSELING DISCUSSION: ICD-10-CM

## 2024-08-12 DIAGNOSIS — D46.9 MYELODYSPLASTIC SYNDROME: Primary | ICD-10-CM

## 2024-08-12 DIAGNOSIS — D61.818 PANCYTOPENIA: ICD-10-CM

## 2024-08-12 DIAGNOSIS — Z76.82 STEM CELL TRANSPLANT CANDIDATE: ICD-10-CM

## 2024-08-12 DIAGNOSIS — G47.00 INSOMNIA, UNSPECIFIED TYPE: ICD-10-CM

## 2024-08-12 PROCEDURE — 36430 TRANSFUSION BLD/BLD COMPNT: CPT

## 2024-08-12 PROCEDURE — 99999 PR PBB SHADOW E&M-EST. PATIENT-LVL III: CPT | Mod: PBBFAC,,, | Performed by: STUDENT IN AN ORGANIZED HEALTH CARE EDUCATION/TRAINING PROGRAM

## 2024-08-12 PROCEDURE — 25000003 PHARM REV CODE 250: Performed by: INTERNAL MEDICINE

## 2024-08-12 RX ORDER — HYDROCODONE BITARTRATE AND ACETAMINOPHEN 500; 5 MG/1; MG/1
TABLET ORAL ONCE
Status: COMPLETED | OUTPATIENT
Start: 2024-08-12 | End: 2024-08-12

## 2024-08-12 RX ORDER — ACETAMINOPHEN 325 MG/1
650 TABLET ORAL
Status: DISCONTINUED | OUTPATIENT
Start: 2024-08-12 | End: 2024-08-12 | Stop reason: HOSPADM

## 2024-08-12 RX ORDER — LIDOCAINE 50 MG/G
OINTMENT TOPICAL 3 TIMES DAILY PRN
Qty: 50 G | Refills: 2 | Status: SHIPPED | OUTPATIENT
Start: 2024-08-12

## 2024-08-12 RX ADMIN — SODIUM CHLORIDE: 9 INJECTION, SOLUTION INTRAVENOUS at 08:08

## 2024-08-12 NOTE — PROGRESS NOTES
Palliative Medicine Clinic Note        Consult Requested By: Dr. Mohit Hickey      Reason for Consult/Chief Complaint: Symptom management and ACP in the setting of MDS    Patient was offered a  by this provider for the visit; he declined and requested that his wife serve as his      ASSESSMENT/PLAN:      Plan/Recommendations:    Guillaume was seen today for pain, insomnia, establish care and fatigue.    Diagnoses and all orders for this visit:    Myelodysplastic syndrome/Stem cell transplant candidate  - followed by Dr. Hickey  - currently awaiting bone marrow biopsy on 08/20/2024 then halpoSCT in September 2024    Encounter for palliative care/Advance Care Planning   - patient decisional and accompanied by his wife today  - no ACP documents uploaded into EMR  - philosophy of palliative medicine reviewed with patient/family today  - new patient folder in English provided today, though requested that [facility] staff send new patient folder information in Bulgarian to patient after completion of visit. [facility] RN acknowledged request and reports she will send the Bulgarian information to patient/family  - ACP booklet given to and reviewed with patient and family; Bulgarian copy provided to patient/family today in clinic   - goals: life prolonging; proceed with biopsy and haploSCT  - code status not discussed     Pain in both lower extremities/Abdominal pain  - patient reporting two separate pains today  - he notes that his legs hurt with walking; he has been diagnosed with PAD previously. He has been unable to complete treatment for PAD secondary to treatment of MDS. Pain improves with rest  - patient also noting pain in his stomach near injection sites; patient with bruising and two small areas of firmness under the skin; some tenderness to touch  - discussed use of hot compresses to help with bruising/hematomas  - discussed use of lidocaine topical ointment to help with pain from bruising  - does  not need opioid medication at this time  - opioid safety sheet in new patient folder for patient/family to review  - will prescribe narcan in future if opioids are needed    Neoplastic (malignant) related fatigue/Insomnia  - patient with mild fatigue but severe insomnia  - fatigue likely worsened due to low blood counts  - he notes that insomnia is his worst problem. He has been having worsening anxiety for past two to three years  - patient will only sleep maybe one to three hours at night, but he spends most of the time walking around, listening to music on his phone, watching television, etc at night  - patient currently being cross tapered between Zolpidem and Trazodone per oncology  - patient now taking zolpidem 5 mg qod and trazodone nightly; he will completely transition to just trazodone by Monday of next week  - will request that Pall med RN reach out to patient/family next week to see how he is sleeping once cross taper completed; adjustments to be made if needed at that time  - discussed sleep hygiene and tip sheet for better sleep in new patient folder for patient/family to review  - patient energy and sleep also likely affected by his inability to complete activities he enjoys such as drawing/painting. He does not have a creative outlet at this time    Adjustment to life threatening illness  - patient denies any anxiety/depression on ESAS and feels that his mood is good  - he reports that he is not consciously aware of any worries but he is not sure if subconsciously his worries are affecting him  - patient has not been able to do some of the activities that bring his life meaning, such as painting, gardening, drawing etc  - emotional support provided along with Pall med SW; please see AD note for full details  - hold on pharmacologic intervention at this time    Advance Care Planning   Advance Directives:   Living Will: No    [place]: No    Do Not Resuscitate Status: No    Medical Power of : No         Oral Declaration: Yes   Witnesses:  Niesha Patrick LCSW   Agent's Name:  Yennifer Thomas   Agent's Contact Number:  [phone]    Decision Making:  Patient answered questions and Family answered questions  Goals of Care: Advance Care Planning    Date: 08/12/2024    [facility]  I engaged the patient and family in a voluntary conversation about advance care planning and we specifically addressed what the goals of care would be moving forward, in light of the patient's change in clinical status, specifically MDS.  We did specifically address the patient's likely prognosis, which is good .  We explored the patient's values and preferences for future care.  The patient and family endorses that what is most important right now is to focus on avoiding the hospital, remaining as independent as possible, symptom/pain control, and curative/life-prolongation (regardless of treatment burdens)    Accordingly, we have decided that the best plan to meet the patient's goals includes continuing with treatment    A total of 16 min was spent on advance care planning, goals of care discussion, emotional support, formulating and communicating prognosis and exploring burden/benefit of various approaches of treatment. This discussion occurred on a fully voluntary basis with the verbal consent of the patient and/or family.          Follow up: 6-8 weeks     Plan discussed with: oncologist       SUBJECTIVE:      History of Present Illness / Interval History:  Guillaume Salinas is 68 y.o. male with emphysema, right iliac artery stenosis, CAD, HTN, HLD, CKD stage III, and MDS presents to Palliative Care Clinic for physical symptoms, advance care planning,, clarification of goals of care, and additional support.. Please see oncology notes for more details on oncologic history and treatment course.       8/12/24  History of Present Illness    CHIEF COMPLAINT:  - Evaluation of chronic insomnia  - Discussion of palliative care  management for myelodysplastic syndrome (MDS)    HISTORY OBTAINED FROM:  - Patient  - Wife    HPI:  The patient has been suffering from insomnia for approximately 2-3 years, which began before his MDS diagnosis. He reports difficulty falling and staying asleep, often waking up after one to three hours. By 6:00 AM, he is exhausted and often crashes during the day. The patient has been on zolpidem 10 mg for about 2-3 years, prescribed by Dr. Thomas, but reports it is not effective. Recently, Dr. Hickey adjusted his medication regimen, prescribing trazodone and tapering the zolpidem. The patient is currently weaning off zolpidem, taking half a tablet every other day.    The patient reports significant fatigue and weakness, impacting his daily activities and ability to pursue interests like painting and gardening. He recently received transfusions due to low blood counts related to his MDS. The patient experiences pain from injections in his abdomen, presenting as two tender lumps. He applies warm compresses to alleviate the discomfort.    Patient has peripheral artery disease, causing leg pain when walking. He was previously prescribed cilostazol, but it was discontinued due to his current MDS treatment. The patient's activity level has decreased significantly; he previously walked up to 10,000 steps but now has difficulty with even short distances due to fatigue and weakness.    The patient is scheduled for a bone marrow transplant in September. He expresses a desire to improve his sleep and overall well-being in preparation for this procedure.    The patient denies having racing thoughts or anxiety that keep him awake at night. He denies any nausea, vomiting, or diarrhea.    GOALS OF CARE/ADVANCED DIRECTIVES:  - Primary goal: undergo a bone marrow transplant in September  Desire to continue enjoying time with family  Wish to resume artistic pursuits  Lives at home with wife who provides support  Wife is primary  decision-maker for medical decisions  Children are secondary decision-makers  Strong support system: wife, children, siblings-in-law  Identify as Caodaism but not regular churchgoers    ACTIVITIES OF DAILY LIVING:  - Experiences significant fatigue and weakness  Unable to maintain previous exercise routine  Requires assistance with gardening and lawn care  Poor sleep at night, often waking after 1-3 hours  Listens to music, drinks milk, or uses electronic devices when unable to sleep  Often falls asleep during the day  Appetite unchanged  Able to eat independently    SOCIAL HISTORY:  - Occasional Coke (soda) consumption  - Coffee: 2-3 cups maximum per day, with sugar and cream/milk  - Marital Status:   - Occupation: Retired artist  - Yazidi: Caodaism, but not regular Religion-goers           ROS:  Review of Systems   Constitutional:  Positive for activity change and fatigue. Negative for appetite change.   HENT: Negative.     Eyes: Negative.    Respiratory: Negative.     Cardiovascular: Negative.    Gastrointestinal:  Positive for abdominal pain. Negative for constipation, diarrhea and nausea.   Genitourinary: Negative.    Musculoskeletal:  Positive for leg pain.   Neurological: Negative.  Negative for syncope and weakness.   Psychiatric/Behavioral:  Positive for sleep disturbance. Negative for dysphoric mood. The patient is not nervous/anxious.    All other systems reviewed and are negative.      Review of Symptoms      Symptom Assessment (ESAS 0-10 Scale)  Pain:  7  Dyspnea:  0  Anxiety:  0  Nausea:  0  Depression:  0  Anorexia:  0  Fatigue:  3  Insomnia:  10  Restlessness:  0  Agitation:  0     CAM / Delirium:  Negative  Constipation:  Negative  Diarrhea:  Negative    Anxiety:  Is not nervous/anxious  Constipation:  No constipation    Bowel Management Plan (BMP):  No      Pain Assessment:  OME in 24 hours:  0  Location(s): abdomen    Abdomen       Location: generalized        Quality: Aching         Quantity: 7/10 in intensity        Chronicity: Onset 1 month(s) ago, unchanged        Aggravating Factors: Pressure        Alleviating Factors: None        Associated Symptoms: None    Modified Yisel Scale:  0    ECOG Performance Status rdGrdrrdarddrderd:rd rd3rd Living Arrangements:  Lives with spouse    Psychosocial/Cultural:   See Palliative Psychosocial Note: Yes  Please see SW note from 08/12/2024 for full details.    Patient enjoys painting/drawing and gardening. He has three children with his wife  **Primary  to Follow**  Palliative Care  Consult: No    Spiritual:  F - [place] and Belief:  Tenriism  I - Importance:  Moderate  C - Community:  None  A - Address in Care:  Needs met        Medications:    Current Outpatient Medications:     acyclovir (ZOVIRAX) 400 MG tablet, Take 1 tablet (400 mg total) by mouth 2 (two) times daily., Disp: 60 tablet, Rfl: 11    carvediloL (COREG) 6.25 MG tablet, Take 1 tablet (6.25 mg total) by mouth 2 (two) times daily., Disp: 180 tablet, Rfl: 3    gabapentin (NEURONTIN) 300 MG capsule, Take 2 capsules (600 mg total) by mouth 3 (three) times daily., Disp: 180 capsule, Rfl: 11    letermovir (PREVYMIS) 480 mg Tab, Take 480 mg by mouth Daily., Disp: 28 tablet, Rfl: 9    levoFLOXacin (LEVAQUIN) 500 MG tablet, Take 1 tablet (500 mg total) by mouth once daily., Disp: 30 tablet, Rfl: 11    pantoprazole (PROTONIX) 40 MG tablet, Take 1 tablet (40 mg total) by mouth once daily., Disp: 30 tablet, Rfl: 3    posaconazole (NOXAFIL) 100 mg TbEC tablet, Take 3 tablets (300 mg total) by mouth once daily., Disp: 90 tablet, Rfl: 11    traZODone (DESYREL) 50 MG tablet, Take 1 tablet (50 mg total) by mouth nightly as needed for Insomnia., Disp: 30 tablet, Rfl: 11    vancomycin (VANCOCIN) 125 MG capsule, Take 125 mg by mouth 4 (four) times daily., Disp: , Rfl:     venetoclax (VENCLEXTA) 100 mg Tab, Take 1 tablet (100 mg) by mouth once daily on days 1-7 of a 28-day cycle. Do not discard any  remaining tablets., Disp: 7 tablet, Rfl: 11    voriconazole (VFEND) 200 MG Tab, Take 1 tablet (200 mg total) by mouth 2 (two) times daily., Disp: 60 tablet, Rfl: 11    LIDOcaine (XYLOCAINE) 5 % Oint ointment, Apply topically 3 (three) times daily as needed (abdominal pain)., Disp: 50 g, Rfl: 2  No current facility-administered medications for this visit.      External  database queried on 08/13/2024  by Elvira BARTLETT :  07/17/2024 Gabapentin 300 mg Disp: 180 for 30 days  07/17/2024 Zolpidem tartrate 10 mg Disp: 30 for 30 days  07/01/2024 Gabapentin 300 mg Disp: 90 for 30 days      Review of patient's allergies indicates:  No Known Allergies        OBJECTIVE:         Physical Exam:  Vitals: Pulse: 85 (08/12/24 1354)  BP: 123/71 (08/12/24 1354)  SpO2: 98 % (08/12/24 1354)    Physical Exam  Vitals reviewed.   Constitutional:       General: He is not in acute distress.     Appearance: He is not toxic-appearing or diaphoretic.   HENT:      Head: Normocephalic and atraumatic.      Right Ear: External ear normal.      Left Ear: External ear normal.   Eyes:      General: No scleral icterus.        Right eye: No discharge.         Left eye: No discharge.      Extraocular Movements: Extraocular movements intact.   Neck:      Comments: Trachea midline  Pulmonary:      Effort: Pulmonary effort is normal. No respiratory distress.   Abdominal:      General: Abdomen is flat. There is no distension.      Comments: Bruising noted around abdomen   Musculoskeletal:         General: No signs of injury.      Cervical back: Normal range of motion.      Right lower leg: No edema.      Left lower leg: No edema.   Skin:     General: Skin is dry.      Coloration: Skin is not jaundiced.      Findings: No rash.   Neurological:      General: No focal deficit present.      Mental Status: He is oriented to person, place, and time.      Cranial Nerves: No cranial nerve deficit.   Psychiatric:         Mood and Affect: Mood normal.    "      Behavior: Behavior normal.         Thought Content: Thought content normal.         Judgment: Judgment normal.           Labs: I have reviewed the latest labs on 2024 including CBC showing WBC: 1.08, H.2, Hct: 21.7, platelets: 11 as well as CMP showing Cr: 1.0      Imaging: I have reviewed the latest imaging on 05/15/2024 including CT chest showing "1. No acute/emergent intrathoracic findings appreciated. 2. 0.2-cm ground-glass nodule left upper lobe and, 0.5-cm groundglass nodule in the right lower lobe (4:201), not significantly changed. 3. Symmetric minimal paraseptal emphysematous changes with apical predominance, not significantly changed."    Additional 16 min time spent on a voluntary advance care planning and /or goals of care discussion, providing emotional support, formulating and communicating prognosis and exploring burden/benefit of various approaches of treatment.     This note was generated with the assistance of ambient listening technology. Verbal consent was obtained by the patient and accompanying visitor(s) for the recording of patient appointment to facilitate this note. I attest to having reviewed and edited the generated note for accuracy, though some syntax or spelling errors may persist. Please contact the author of this note for any clarification.    Elvira Maria MD      "

## 2024-08-12 NOTE — NURSING
Pt tolerated 1 unit of PRBC's and 1 unit plts well today. PIV flushed and removed. Appt calendar given to patient. Discharged in NAD.

## 2024-08-12 NOTE — PROGRESS NOTES
Advance Care Planning   [facility] Palliative Medicine [facility]  Palliative Care   Psychosocial Assessment    Patient Name: Guillaume Salinas  MRN: [id number]  Palliative Care Provider: Elvira Maria MD   Primary Care Physician: Kal Hargrove MD  Principal Problem: Myelodysplastic syndrome    Reason for Referral:  Advanced Care Planning and Psychosocial Support     Present during Interview: patient and spouse/SO.      Primary Language:Welsh    Needed: no      Past Medical Situation:   PMH:   Past Medical History:   Diagnosis Date    Anticoagulant long-term use     Coronary artery disease     Hypertension     Myelodysplastic syndrome     Peripheral vascular disease, unspecified      Mental Health/Substance Use History: none identified   Risk of Abuse, neglect or exploitation: none identified   Current or Previous Trauma and/or evidence of PTSD: none identified   Non-traditional Health practices: none identified     Understanding of diagnosis and prognosis: Fair   Experience/Comfort level with health care system: Fair     Patients Mental Status: Alert and oriented to person, place, time and situation with stable mood.     Socio-Economic Factors/Resources:  Address: [address] [place] [address]  Phone Number: [phone] (home)     Marital Status:   Household composition: Patient and spouse   Children: three adult children     Patient/Family perceptions about Caregiving Needs; availability and capacity: Patient receives assistance from spouse as needed.     Family Dynamics/Relationships: Healthy     Patient/Family Strengths/Resilience: Patient and spouse are open, friendly, and willing to share with team   Patient/Family Coping: Appropriately     Activities of Daily Living: Assistance as needed (due to fatigue)  Support Systems-Family & Community (Home Health, HME etc): Outpatient PT/OT    Transportation:  yes    Work/Education History: retired   Self-Care  Activities/Hobbies:  Painting, gardening, spending time with family      History: no    Financial Resources:Medicare      Advanced Care Planning & Legal Concerns:   Advanced Directives/Living Will: no  [place]POST/POLST: no   Planning:  no    Power of : no    Emergency Contacts: Spouse/Yennifer Thomas     Spirituality, Culture & Coping Mechanisms:  F- [place] and Belief: Uatsdin     I - Importance: Moderate     C - Community/Culture Values: The couple practices independently.  Patient consults with sister for spiritual guidance as needed.     A - Address in Care: Needs met at this time       Goals/Hopes/Expectations:  Improve sleep and receive a transplant.   Fears/Anxiety/Concerns: Patient expresses concern regarding not receiving a transplant.         Preferences about EOL Environment: (own bed, family nearby, pets, music, etc)      Complicated Bereavement Risk Assessment Tool (CBRAT)  Reference:  [facility] Clinical Practice Group (May 2016). Bereavement Risk Screening and Management Guidelines.  Retrieved from: http://www.grpcc.com.au/wp-content/uploads//QWJWO-Wakqrgxlbrx-Tgcumlmjr-and-Management-Guideline-2016.pdf      Bereaved Client Characteristics   Under 18      no  Was a Twin   no  Young Spouse   no  Elderly Spouse    yes  Isolated    no  Lacks Meaningful Social Support   no  Dissatisfied with help available during illness   no  New to [employer] [place] no  New to Decision-Making   no    Illness  Inherited Disorder   no  Stigmatized Disease in the family/community   no  Lengthy/Burdensome   yes     Bereaved Client's History of Loss   Cumulative Multiple Losses   no  Previous Mental Health Illnesses   no  Current Mental Health Illness   no  Other Significant Health Issues   no   Migrant/Refugee   no Death  Sudden or Unexpected   no  Traumatic Circumstances Associated with Death   no  Significant Cultural/Social Burdens as a result of  Death   yes   Relationship with   Profound Lifelong Partner   yes  Highly Dependent    no  Antagonistic   no  Ambivalent   no  Deeply Connected   yes  Culturally Defined   yes   Risk Factors Scores  0-2  Low  3-5  Moderate  5+  High  All persons scoring moderate to high presume to be at risk**    (** It is acknowledged that protective factors and resilience may outweigh apparent risk factors.      Total Risk Factors Score:   Moderate     Advance Care Planning     Date: 2024    [facility]  Team engaged the patient and spouse/Yennifer  in a voluntary conversation about advance care planning and we specifically addressed what the goals of care would be moving forward, in light of the patient's change in clinical status, specifically Myelodysplastic syndrome.  We did not specifically address the patient's likely prognosis, which is  TBD .  We explored the patient's values and preferences for future care.  The patient endorses that what is most important right now is to focus on better symptom management and receiving a stem cell transplant.     Accordingly, we have decided that the best plan to meet the patient's goals includes continuing with treatment    A total of 15 min was spent on advance care planning, goals of care discussion, emotional support, formulating and communicating prognosis and exploring burden/benefit of various approaches of treatment. This discussion occurred on a fully voluntary basis with the verbal consent of the patient and/or family.     Plan of Care:   SW accompanied MD during initial visit with patient and spouse/Yennifer. Patient presents Oriented x4 with normal mood. Patient appears to have a fair understanding of their illness. Patient acknowledges his hope is to receive a stem-cell transplant and improve his symptoms of fatigue and insomnia.  Patient enjoys gardening and painting and wishes to feel well enough to resume these activities. Patient reports having good support from  his spouse, children, and in-laws. ACP pamphlet introduced to patient.  Patient to review fully at his convenience.  Patient reports in the event he could not speak for himself, patient would want his spouse and children to speak to medical providers on his behalf.      Patient denies further psychosocial concerns. SW remains available to provide assistance as needed. SW will continue to follow.     Niesha Patrick, SHAREEW-BACS    Palliative Medicine

## 2024-08-12 NOTE — PROGRESS NOTES
PATIENT: Guillaume Salinas  MRN: [id number]  DATE: 8/16/2024    Diagnosis:   1. Myelodysplastic syndrome    2. Pancytopenia    3. Stem cell transplant candidate    4. Immunodeficiency secondary to neoplasm    5. Chemotherapy-induced nausea and vomiting    6. Oral infection      Chief Complaint: myelodysplastic syndrome    Oncologic History:      Oncologic History Myelodysplastic syndrome      Oncologic Treatment Azacitidine/venetoclax (start date 6/12/23).      Pathology 4/23/24:  BONE MARROW ASPIRATE, TOUCH PREP, CLOT, AND DECALCIFIED NEEDLE CORE BIOPSY:   RIGHT POSTEROSUPERIOR ILIAC CREST   - MORPHOLOGIC AND IMMUNOPHENOTYPIC FINDINGS COMPATIBLE WITH PERSISTENT MYELOID NEOPLASM, correlate with NGS results for final interpretation (see comments)   - VERY LIMITED SPECIMEN: Findings may not be entirely representative   - Cellular marrow with increased, subset of minute clusters of CD34+ immunophenotypic blasts (at least 5% by flow cytometric analysis) and residual trilineage hematopoiesis with marked megakaryocytic hypoplasia with increased micromegakaryocytes (see   comments)   - Relative lymphocytosis with unremarkable small mature lymphocytes   - Normal AML adult FISH panel studies (see comments)   - Adequate stainable histiocytic iron stores (3+ out of 6+)     7/3/23:  BONE MARROW ASPIRATE, TOUCH PREP, CLOT, AND DECALCIFIED NEEDLE CORE BIOPSY:   LEFT POSTEROSUPERIOR ILIAC CREST   - MORPHOLOGIC AND IMMUNOPHENOTYPIC FEATURES OF PERSISTENT MDS   - LIMITED, SUBOPTIMAL SPECIMEN: Findings may not be entirely representative   - Hypocellular marrow (20% total cellularity) with no increased CD34+ blasts, chemotherapeutic changes, and scant residual trilineage hypoplasia and dyspoiesis with increased numbers of micromegakaryocytes   - Relative lymphocytosis including small, well-defined lymphoid aggregates (favor benign/reactive)   - Relative polytypic plasmacytosis (5-10%) with morphologically unremarkable plasma  cells   - Mildly increased stainable histiocytic iron stores (4+ out of 6+)     5/23/23:  LEFT ILIAC CREST BONE MARROW ASPIRATE, BONE MARROW CLOT, AND BONE MARROW CORE BIOPSY WITH:     CELLULARITY= 90-95%, MYELOID PREDOMINANCE (M/E= 7:1).   CONSISTENT WITH MYELODYSPLASTIC SYNDROME WITH EXCESS BLASTS-2 (16-17%).  SEE COMMENT.   ADEQUATE STORAGE IRON.   INCREASED NUMBER OF MEGAKARYOCYTES WITH DYSPLASTIC MORPHOLOGY.       4/26/23:  Bone marrow, left iliac crest, aspirate, clot, and core biopsy:   --Hypercellular marrow for age, 80-90% with trilineage maturation showing increased blasts and small megakaryocytic forms, most compatible with a primary marrow neoplasm, favor myelodysplasia with excess blasts (MDS-EB-2) with impending evolution, final   classification pending correlation with cytogenetic and molecular studies, see comment   --Stainable iron is not increased   --Mild reticulin fibrosis          Bone marrow, left iliac crest, aspirate, clot, and core biopsy:   --Hypercellular marrow for age, 80-90% with trilineage maturation showing increased blasts and small megakaryocytic forms, most compatible with a primary marrow neoplasm, favor myelodysplasia with excess blasts (MDS-EB-2) with impending evolution, final   classification pending correlation with cytogenetic and molecular studies, see comment   --Stainable iron is not increased   --Mild reticulin fibrosis     Comment:  Concomitantly submitted flow cytometric analysis detects a mildly increased myeloblast population, concerning for a primary marrow process.  The blast gate is increased with approximately 8% CD38/CD34 positive blasts identified.  Populations of    B lymphocytes are polyclonal and T lymphocytes are immunophenotypically unremarkable.     This study is somewhat limited by lack of cellularity on aspirate smears with mild reticulin fibrosis noted.  However, there are increased CD34 positive blasts, counted at 12% on the immunohistochemical stain  "with increased megakaryocytes showing many micromegakaryocytic forms.  The morphology in conjunction with peripheral cytopenias is compatible with a primary marrow neoplasm, highly suggestive of myelodysplasia with excess blasts (MDS-EB-2) although an evolving acute leukemia is of concern.     Correlation with clinical findings and any available cytogenetic and molecular studies is required for further characterization.              Subjective:    History of Present Illness: Mr. Betsy Salinas is a 68 y.o. male who presented in April 2023 for evaluation and management of anemia.    [I do not see evidence of anemia in his labs, but he was referred for anemia workup.]    - he went to [place] for a dental procedure. He had several teeth removed ("an intense procedure per his wife"). He had taken antibiotics/amoxicillin and ibuprofen. He had the second part of procedure about a week later. He noted severe fatigue, weakness.  - he underwent bone marrow biopsy on 4/26/23.  - he met with Dr. Hickey on 5/8/23. He recommended repeat bone marrow biopsy.  - he underwent bone marrow biopsy on 5/23/23.  - he met with Dr. Hickey on 5/30/23. 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 CG/FISH/NGS pending. I reviewed with him and his family the aggressive nature of this disease, including natural history, prognosis, and treatment options. I recommended treatment with azacitidine 75 mg/m2 daily x7 days plus venetoclax 100mg (posa) daily x21 of 28 days. He was in agreement. Consent signed today.   Azacitidine plus venetoclax x21 of 28 days as above. Taz ramp up ordered.  Repeat bone marrow biopsy at the end of cycle 1. Orders placed.     Stem cell transplant candidate: We briefly discussed allogeneic stem cell transplantation in MDS. He will need transplant in CR1. Will plan for further discussion of stem cell transplant and meeting with transplant coordinators at next follow up " "in 2-3 weeks.   Will send HLA typing with next labs."    - he underwent bone marrow biopsy on 7/3/23    - he met with Dr. Hickey on 6/20/23. Information per note:  "HCT-CI of 1 (intermediate risk). Will plan to proceed with transplant in the next 2-3 months (as soon as a donor is identified). HLA typing sent."    - he saw Dr. Hickey on 7/11/23. Per his note: "will plan to proceed with transplant in the next 2-3 months (as soon as a donor is identified)."    - he underwent bone marrow biopsy on 9/6/23.  - he saw Dr. Hickey on 10/14/23.    - he saw Dr. Hickey on 1/19/24. Per his note:  "His sister's typing is still pending. Given her age and timing for transplant, I believe it would be best to move forward with transplant using one of his children as a donor. We discussed pros and cons, and they are in agreement. They have one more trip to [place] planned in March, so will plan for transplant after March (tentatively April)."    - he was hospitalized in April 2024 for neutropenic fever/sepsis/possible gastrointestinal bleeding.  - he underwent bone marrow biopsy on 4/23/24.      Interval history:  - he presents for a follow-up appointment for his myelodysplastic syndrome.  - plan per Nito Adorno is to proceed with bone marrow biopsy on 8/20/24, followed by stem cell transplant in September 2024  - today, he endorses fatigue, nausea, arthralgias.        Past medical, surgical, family, and social histories have been reviewed and updated below.    Past Medical History:   Past Medical History:   Diagnosis Date    Anticoagulant long-term use     Coronary artery disease     Hypertension     Myelodysplastic syndrome     Peripheral vascular disease, unspecified        Past Surgical History:   Past Surgical History:   Procedure Laterality Date    BONE MARROW BIOPSY Left 4/26/2023    Procedure: Biopsy-bone marrow;  Surgeon: Harry Diamond MD;  Location: [facility];  Service: Oncology;  Laterality: Left;    COLONOSCOPY " N/A 01/05/2022    Procedure: COLONOSCOPY Golytely Vaccinated will bring cards;  Surgeon: Dereje Simon MD;  Location: [facility];  Service: Endoscopy;  Laterality: N/A;  Do not cancel this order       Family History:   Family History   Problem Relation Name Age of Onset    Hypertension Mother      Cancer Father      Cancer Brother 9     No Known Problems Daughter      No Known Problems Daughter      No Known Problems Son         Social History:  reports that he quit smoking about 17 months ago. His smoking use included cigarettes. He started smoking about 51 years ago. He has a 12.5 pack-year smoking history. He has been exposed to tobacco smoke. He has never used smokeless tobacco. He reports that he does not currently use alcohol. He reports that he does not use drugs.    Allergies:  Review of patient's allergies indicates:  No Known Allergies    Medications:  Current Outpatient Medications   Medication Sig Dispense Refill    acyclovir (ZOVIRAX) 400 MG tablet Take 1 tablet (400 mg total) by mouth 2 (two) times daily. 60 tablet 11    carvediloL (COREG) 6.25 MG tablet Take 1 tablet (6.25 mg total) by mouth 2 (two) times daily. 180 tablet 3    gabapentin (NEURONTIN) 300 MG capsule Take 2 capsules (600 mg total) by mouth 3 (three) times daily. 180 capsule 11    letermovir (PREVYMIS) 480 mg Tab Take 480 mg by mouth Daily. 28 tablet 9    levoFLOXacin (LEVAQUIN) 500 MG tablet Take 1 tablet (500 mg total) by mouth once daily. 30 tablet 11    pantoprazole (PROTONIX) 40 MG tablet Take 1 tablet (40 mg total) by mouth once daily. 30 tablet 3    posaconazole (NOXAFIL) 100 mg TbEC tablet Take 3 tablets (300 mg total) by mouth once daily. 90 tablet 11    traZODone (DESYREL) 50 MG tablet Take 1 tablet (50 mg total) by mouth nightly as needed for Insomnia. 30 tablet 11    vancomycin (VANCOCIN) 125 MG capsule Take 125 mg by mouth 4 (four) times daily.      venetoclax (VENCLEXTA) 100 mg Tab Take 1 tablet (100 mg) by mouth  once daily on days 1-7 of a 28-day cycle. Do not discard any remaining tablets. 7 tablet 11    voriconazole (VFEND) 200 MG Tab Take 1 tablet (200 mg total) by mouth 2 (two) times daily. 60 tablet 11     No current facility-administered medications for this visit.     Facility-Administered Medications Ordered in Other Visits   Medication Dose Route Frequency Provider Last Rate Last Admin    acetaminophen tablet 650 mg  650 mg Oral PRN Mohit Hickey MD           Review of Systems   Constitutional:  Positive for fatigue.   HENT:  Negative for sore throat.    Eyes:  Negative for visual disturbance.   Respiratory:  Positive for shortness of breath.    Cardiovascular:  Negative for chest pain.   Gastrointestinal:  Negative for abdominal pain.   Genitourinary:  Negative for dysuria.   Musculoskeletal:  Positive for arthralgias. Negative for back pain.   Skin:  Negative for rash.   Neurological:  Positive for weakness and headaches.   Hematological:  Negative for adenopathy.   Psychiatric/Behavioral:  The patient is not nervous/anxious.        ECOG Performance Status:   ECOG SCORE    1 - Restricted in strenuous activity-ambulatory and able to carry out work of a light nature         Objective:      Vitals:   Vitals:    08/16/24 1017   BP: 109/66   BP Location: Right arm   Patient Position: Sitting   BP Method: Medium (Automatic)   Pulse: 93   Resp: 18   Temp: 99.2 °F (37.3 °C)   TempSrc: Oral   SpO2: 97%   Weight: 74 kg (163 lb 2.3 oz)     BMI: Body mass index is 24.81 kg/m².    Physical Exam  Vitals and nursing note reviewed.   Constitutional:       Appearance: He is well-developed.   HENT:      Head: Normocephalic and atraumatic.   Eyes:      Pupils: Pupils are equal, round, and reactive to light.   Cardiovascular:      Rate and Rhythm: Normal rate and regular rhythm.   Pulmonary:      Effort: Pulmonary effort is normal.      Breath sounds: Normal breath sounds.   Abdominal:      General: Bowel sounds are normal.  "     Palpations: Abdomen is soft.   Musculoskeletal:         General: Normal range of motion.      Cervical back: Normal range of motion and neck supple.   Skin:     General: Skin is warm and dry.   Neurological:      Mental Status: He is alert and oriented to person, place, and time.   Psychiatric:         Behavior: Behavior normal.         Thought Content: Thought content normal.         Judgment: Judgment normal.         Laboratory Data:  Labs have been reviewed.    Lab Results   Component Value Date    WBC 0.77 (LL) 08/15/2024    HGB 7.9 (L) 08/15/2024    HCT 23.3 (L) 08/15/2024    MCV 89 08/15/2024    PLT 48 (L) 08/15/2024       Imaging:        Assessment:       1. Myelodysplastic syndrome    2. Pancytopenia    3. Stem cell transplant candidate    4. Immunodeficiency secondary to neoplasm    5. Chemotherapy-induced nausea and vomiting    6. Oral infection           Plan:     Myelodysplastic syndrome / neutropenia due to chemo.  - bone marrow biopsy (4/26/23) revealed myelodysplastic syndrome.  - he met with Dr. Hickey on 5/8/23. He recommended repeat bone marrow biopsy.  - bone marrow biopsy (5/23/23) revealed myelodysplastic syndrome with excess blasts.  - he met with Dr. Hickey on 5/30/23. He recommended azacitidine (7-day) with venetoclax.  - he has received several blood transfusions in June 2023  - he met with Dr. Hickey on 6/20/23. Information per note:  "HCT-CI of 1 (intermediate risk). Will plan to proceed with transplant in the next 2-3 months (as soon as a donor is identified). HLA typing sent."  - bone marrow biopsy (7/3/23) revealed no increased blasts! He will follow up with Dr. Hickey on 7/11/23  - he has received blood/platelets on 7/5/23.  - he has not received blood/platelet transfusion in several weeks.  - he saw Dr. Hickey on 8/15/23. Per his note:  " Will plan to proceed with transplant ASAP (as soon as a donor is identified). HLA typing sent."  - he underwent bone marrow biopsy on " 9/6/23.   - change vidaza to 5 days, and venetoclax from 14 days to 7 days every 28 days (due to cytopenias).  - holding azacitidine and ventoclax due to cytopenias.  - bone marrow biopsy (4/23/24) revealed myeloid neoplasm with subset of minute clusters of CD34+ immunophenotypic blasts (at least 5% by flow cytometric analysis) and residual trilineage hematopoiesis with marked megakaryocytic hypoplasia with increased micromegakaryocytes.  - plan per [facility] is to proceed with bone marrow biopsy on 8/20/24, followed by stem cell transplant in September 2024  - will touch base with Dr. Hickey to see if he wants Mr. Meyer to proceed with azacitidine during week of 8/26/24.  - return to clinic in 3 months.    ADDENDUM: I spoke to Dr. Hickey. We will cancel azacitidine during week of 8/26/24.    2. Chemotherapy-induced nausea/vomiting  - mild symptoms. Continue phenergan as needed.    3. Atherosclerosis of aorta  - seen on imaging  - continue aspirin, atorvastatin    4. Oral infection  - I will send augmentin and chlorhexidine mouthwash in.    5. Advance Care Planning     Power of   After our discussion (at previous visit), the patient has decided not to complete a HCPOA.       - return to clinic in 3 months.    Harry Diamond M.D.  Hematology/Oncology  [facility] - [address], [address]  [place] [address]  Phone: [phone]  Fax: [phone]

## 2024-08-13 ENCOUNTER — CLINICAL SUPPORT (OUTPATIENT)
Dept: REHABILITATION | Facility: HOSPITAL | Age: 69
End: 2024-08-13
Payer: MEDICARE

## 2024-08-13 DIAGNOSIS — R68.89 DECREASED ACTIVITY TOLERANCE: Primary | ICD-10-CM

## 2024-08-13 PROCEDURE — 97530 THERAPEUTIC ACTIVITIES: CPT | Mod: PN,CQ

## 2024-08-13 PROCEDURE — 97110 THERAPEUTIC EXERCISES: CPT | Mod: PN,CQ

## 2024-08-13 RX ORDER — VANCOMYCIN HYDROCHLORIDE 125 MG/1
125 CAPSULE ORAL 4 TIMES DAILY
Qty: 120 CAPSULE | Refills: 0 | Status: SHIPPED | OUTPATIENT
Start: 2024-08-13 | End: 2024-09-12

## 2024-08-13 NOTE — PROGRESS NOTES
[facility]   Physical Therapy Treatment Note      Name: Guillaume Salinas  Clinic Number: [id number]    Therapy Diagnosis:   Encounter Diagnosis   Name Primary?    Decreased activity tolerance Yes       Physician: Mohit Hickey MD    Visit Date: 2024    Physician Orders: PT Eval and Treat   Medical Diagnosis from Referral: Myelodysplastic syndrome [D46.9], Stem cell transplant candidate [Z76.82], Physical deconditioning [R53.81]   Evaluation Date: 2024  Authorization Period Expiration: 2025  Plan of Care Expiration: 2024  Progress Note Due: 2024  Date of Surgery: N/A  Visit # / Visits authorized:  + eval  FOTO: 1/3     Precautions: Standard and cancer; AMN  utilized throughout visit; consider holding or modifying session to seated exercise if hemoglobin numbers continue to trend low     Time In: 1:45 PM  Time Out: 2:25 PM  Total Billable Time: 40 minutes (2TE + 1 TA)    PTA Visit #:      Subjective     Patient reports: Again with complaints of fatigue but wants to attempt treatment.    He  was  compliant with home exercise program. Has done home exercise program 2 days.   Response to previous treatment: No adverse response  Functional change: Ongoing    Pain: 0/10  Location: N/A      Objective      Objective Measures updated at progress report unless specified.     Treatment     Guillaume received the treatments listed below:      therapeutic exercises to develop strength and endurance for 30 minutes including:  - stepper bike 8 minutes level 4.0 double peak  - bridges: 3x10  - clamshells: 2x10 B with green theraband  - sidelying hip abduction: 2x10 with 2# ankle weights  - Heel raises: 2x20  - Toe raises: 2x20  - Lateral steps 6 lengths x 10 feet with green theraband  - Heel walkin lengths x 10 feet    Not completed today:  - standing hip abduction x15 B green theraband (could not tolerate additional reps)   - Standing hip  "extension 2x15 B green theraband    - Standing calf raises 2x15 - no support   - standing toe raises 2 x 15   - Session ended on stepper x 8 minutes level 4 progressive intervals     therapeutic activities to improve functional performance for 10 minutes, including:  - Began session on treadmill x 8 minutes 1.7 speed; incline level 1  - Step ups forward: 6" in 2x10 B with bilateral upper extremity support  - Sit<> stand: 2x10 from elevated mat and overhead press 4.4# yellow med ball       Not completed today:  - Lateral tony (mixed 6" and 9") step overs x 6 lengths x 10 feet each with 1.5# ankle weights  - Forward/reciprocal tony (mixed 6" and 9") step overs x 6 lengths x 10 feet each with 1.5# ankle weights  - Step up with contralateral hip : 2x15; 6" step    Circuit x2  - farmer's carries with 6# weights x 300'  - sit to stand from elevated mat + 6# dumbbell shoulder press x10      Patient Education and Home Exercises       Education provided:   - HEP instruction    Written Home Exercises Provided: yes. Exercises were reviewed and Guillaume was able to demonstrate them prior to the end of the session.  Guillaume demonstrated good  understanding of the education provided. See Electronic Medical Record under Patient Instructions for exercises provided during therapy sessions.    Assessment     Guillaume arrived to session with high levels of fatigue following infusion yesterday but was agreeable to treatment.  Despite high levels of fatigue and continued low hemoglobin numbers he was insistent on attempting treatment.  Session was modified to accommodate his current status with frequent rest breaks provided and appropriate levels of fatigue achieved.  Continue to monitor patient status and hemoglobin numbers to consider putting PT on hold.    Guillaume Is progressing well towards his goals.   Patient prognosis is Good.     Patient will continue to benefit from skilled outpatient physical therapy to address the " deficits listed in the problem list box on initial evaluation, provide pt/family education and to maximize pt's level of independence in the home and community environment.     Patient's spiritual, cultural and educational needs considered and pt agreeable to plan of care and goals.     Anticipated barriers to physical therapy: Co-morbidities     Goals:     Short Term Goals: 3 weeks   Patient will be compliant with HEP in order to maximize PT benefits (met)  Patient will score >/= 55% on FOTO survey in order to improve self-perception of functional mobility deficits (progressing, not met)  Patient will walk >/= 1300 feet on Six-Minute Walk Test in order to improve endurance for community mobility (progressing, not met)     Long Term Goals: 6 weeks   Patient will score >/= 65% on FOTO survey in order to improve self-perception of functional mobility deficits (progressing, not met)  Patient will walk >/= 1400 feet on Six-Minute Walk Test in order to improve endurance for community mobility (progressing, not met)  Patient will score >/= 13 repetitions on 30-Second Sit to  order to improve bilateral lower extremity endurance and muscular power for transfers (progressing, not met)  Patient will begin some form of home/community fitness in order to sustain progress gained in PT (progressing, not met)    Plan     Continue to progress as per plan of care. Check CBC on Thursday.    Eulalia Krueger PTA

## 2024-08-14 ENCOUNTER — PATIENT MESSAGE (OUTPATIENT)
Dept: HEMATOLOGY/ONCOLOGY | Facility: CLINIC | Age: 69
End: 2024-08-14
Payer: MEDICARE

## 2024-08-15 ENCOUNTER — LAB VISIT (OUTPATIENT)
Dept: LAB | Facility: HOSPITAL | Age: 69
End: 2024-08-15
Attending: INTERNAL MEDICINE
Payer: MEDICARE

## 2024-08-15 DIAGNOSIS — K20.90 ESOPHAGITIS: ICD-10-CM

## 2024-08-15 DIAGNOSIS — Z76.82 STEM CELL TRANSPLANT CANDIDATE: ICD-10-CM

## 2024-08-15 DIAGNOSIS — D46.9 MYELODYSPLASTIC SYNDROME: ICD-10-CM

## 2024-08-15 LAB
ABO + RH BLD: NORMAL
ALBUMIN SERPL BCP-MCNC: 3.8 G/DL (ref 3.5–5.2)
ALP SERPL-CCNC: 86 U/L (ref 55–135)
ALT SERPL W/O P-5'-P-CCNC: 22 U/L (ref 10–44)
ANION GAP SERPL CALC-SCNC: 9 MMOL/L (ref 8–16)
AST SERPL-CCNC: 21 U/L (ref 10–40)
BASOPHILS NFR BLD: 1 % (ref 0–1.9)
BILIRUB SERPL-MCNC: 0.7 MG/DL (ref 0.1–1)
BLD GP AB SCN CELLS X3 SERPL QL: NORMAL
BUN SERPL-MCNC: 18 MG/DL (ref 8–23)
CALCIUM SERPL-MCNC: 9.5 MG/DL (ref 8.7–10.5)
CHLORIDE SERPL-SCNC: 106 MMOL/L (ref 95–110)
CO2 SERPL-SCNC: 24 MMOL/L (ref 23–29)
CREAT SERPL-MCNC: 1 MG/DL (ref 0.5–1.4)
DIFFERENTIAL METHOD BLD: ABNORMAL
EOSINOPHIL NFR BLD: 1 % (ref 0–8)
ERYTHROCYTE [DISTWIDTH] IN BLOOD BY AUTOMATED COUNT: 13 % (ref 11.5–14.5)
EST. GFR  (NO RACE VARIABLE): >60 ML/MIN/1.73 M^2
GLUCOSE SERPL-MCNC: 120 MG/DL (ref 70–110)
HCT VFR BLD AUTO: 23.3 % (ref 40–54)
HGB BLD-MCNC: 7.9 G/DL (ref 14–18)
IMM GRANULOCYTES # BLD AUTO: ABNORMAL K/UL (ref 0–0.04)
IMM GRANULOCYTES NFR BLD AUTO: ABNORMAL % (ref 0–0.5)
LYMPHOCYTES NFR BLD: 92 % (ref 18–48)
MAGNESIUM SERPL-MCNC: 2.1 MG/DL (ref 1.6–2.6)
MCH RBC QN AUTO: 30 PG (ref 27–31)
MCHC RBC AUTO-ENTMCNC: 33.9 G/DL (ref 32–36)
MCV RBC AUTO: 89 FL (ref 82–98)
MONOCYTES NFR BLD: 1 % (ref 4–15)
NEUTROPHILS NFR BLD: 5 % (ref 38–73)
NRBC BLD-RTO: 0 /100 WBC
PHOSPHATE SERPL-MCNC: 3.7 MG/DL (ref 2.7–4.5)
PLATELET # BLD AUTO: 48 K/UL (ref 150–450)
PLATELET BLD QL SMEAR: ABNORMAL
PMV BLD AUTO: 11.9 FL (ref 9.2–12.9)
POTASSIUM SERPL-SCNC: 4.1 MMOL/L (ref 3.5–5.1)
PROT SERPL-MCNC: 7.2 G/DL (ref 6–8.4)
RBC # BLD AUTO: 2.63 M/UL (ref 4.6–6.2)
SODIUM SERPL-SCNC: 139 MMOL/L (ref 136–145)
SPECIMEN OUTDATE: NORMAL
WBC # BLD AUTO: 0.77 K/UL (ref 3.9–12.7)

## 2024-08-15 PROCEDURE — 85027 COMPLETE CBC AUTOMATED: CPT | Performed by: INTERNAL MEDICINE

## 2024-08-15 PROCEDURE — 84100 ASSAY OF PHOSPHORUS: CPT | Performed by: INTERNAL MEDICINE

## 2024-08-15 PROCEDURE — 85007 BL SMEAR W/DIFF WBC COUNT: CPT | Performed by: INTERNAL MEDICINE

## 2024-08-15 PROCEDURE — 36415 COLL VENOUS BLD VENIPUNCTURE: CPT | Performed by: INTERNAL MEDICINE

## 2024-08-15 PROCEDURE — 86900 BLOOD TYPING SEROLOGIC ABO: CPT | Performed by: INTERNAL MEDICINE

## 2024-08-15 PROCEDURE — 80053 COMPREHEN METABOLIC PANEL: CPT | Performed by: INTERNAL MEDICINE

## 2024-08-15 PROCEDURE — 86850 RBC ANTIBODY SCREEN: CPT | Mod: NBTX | Performed by: INTERNAL MEDICINE

## 2024-08-15 PROCEDURE — 83735 ASSAY OF MAGNESIUM: CPT | Mod: NBTX | Performed by: INTERNAL MEDICINE

## 2024-08-15 RX ORDER — PANTOPRAZOLE SODIUM 40 MG/1
40 TABLET, DELAYED RELEASE ORAL DAILY
Qty: 30 TABLET | Refills: 3 | Status: SHIPPED | OUTPATIENT
Start: 2024-08-15 | End: 2024-12-13

## 2024-08-15 NOTE — PROGRESS NOTES
[facility] Follow Up Note      Assessment:  Guillaume Salinas is a 68 y.o. 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  ECOG: (1) Restricted in physically strenuous activity, ambulatory and able to do work of light nature        Plan:  Will review BMBx with med onc. Will continue for stem cell transplant, with  + 2Gy TBI regimen. TBI was discussed, including both acute and long term toxicity including potential lung, CNS, liver, kidney, bowel toxicity, second malignancy, infertility, hair loss. Questions were answered to be best of my ability and re-consent was obtained for TBI.   Will re check measurements  I recommended he establish with a dentist to ensure no risk of dental infection complicating SCT.     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 Diamond of anemia. WBC 2.71, Hgb 7.1, Plts  400. Prior to this visit, he was in [place] for a dental procedure. His symptoms of fatigue started after extractions. Workup by Dr. Diamond unremarkable.   4/26/23: Bone marrow biopsy revealed a hypercellular marrow (80-90%) with increased blasts (8-12%) concerning for MDS EB2. However, sample was limited.   5/23/23: Repeat bone marrow biopsy revealed myelodysplastic syndrome with excess blasts 2 (blasts 16-17%). 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 NGS with ASXL1 (44%), EZH2 (87%), IDH2 (42%), STAG2 (89%), U2AF1 (3%).  6/12/23: Cycle 1 of azacitidine 75 mg/m2 plus venetoclax x14 of 28 days.   7/3/23: Bone marrow biopsy at the end of cycle 1 revealed a hypocellular marrow, no blasts, trilineage hypoplasia and dyspoiesis with increased micromegakaryocytes. FISH with trisomy 8 (three  copies of QBFH2N2). NGS with ASXL1 (20%), EZH2 (40%), IDH2 (17%), STAG2 (38%).  9/6/23: Bone marrow biopsy revealed dysplastic changes but blasts were not increased. Diploid karyotype. NGS with ASXL1 (16%).  He was started treatment with azacitidine 75 mg/m2 daily x7 days plus venetoclax 100mg (voriconazole) daily x14 of 28 days. Venetoclax decreased to 7 days with cycle 3. Bone marrow biopsy 9/6/23 revealed dysplastic changes but blasts were not increased. Diploid karyotype. NGS with ASXL1 (16%).   Currently on hold due to esophagitis  3/4/24: met with BMT, elected to proceed with allogenic stem cell transplant using son as donor using   + 2Gy TBI regimen.   4/5 - 4/8/24: admitted to OSH with neutropenic fever and GI bleed. Cultures were negative. Underwent EGD with findings of gastritis and esophagitis.   Was cleared by GI for transplant.   4/23/24: BMBx with persistent myeloid neoplasm.   6/18/24: Bone marrow biopsy with persistent MDS without increased blasts.           Possibility of pregnancy: N/A  History of prior irradiation: No  History of prior systemic anti-cancer therapy: Yes - only as above  History of collagen vascular disease: No  Implanted electronic device (pacer/defib/nerve stimulator): No     Interval History:  Guillaume Amezcuaona re-presents today to discuss the role of TBI.     Prior transplant date was postponed. Now planned as above.     He has a history of dental implants, he was eating something hard and now has dental pain. He was sent a rinse and an antibiotic by Dr. Diamond. He has started the antibiotic. No fevers. He denies significant weight gain or loss since last meeting. Appetite is lower.     Review of Systems:  ROS as above    Social History:  Social History     Tobacco Use    Smoking status: Former     Current packs/day: 0.00     Average packs/day: 0.3 packs/day for 50.0 years (12.5 ttl pk-yrs)     Types: Cigarettes     Start date: 3/1/1973     Quit date: 3/1/2023      Years since quittin.4     Passive exposure: Past    Smokeless tobacco: Never   Substance Use Topics    Alcohol use: Not Currently    Drug use: Never       Past Medical History:  Past Medical History:   Diagnosis Date    Anticoagulant long-term use     Coronary artery disease     Hypertension     Myelodysplastic syndrome     Peripheral vascular disease, unspecified        Past Surgical History:   Procedure Laterality Date    BONE MARROW BIOPSY Left 2023    Procedure: Biopsy-bone marrow;  Surgeon: Harry Diamond MD;  Location: [facility] OR;  Service: Oncology;  Laterality: Left;    COLONOSCOPY N/A 2022    Procedure: COLONOSCOPY Golytely Vaccinated will bring cards;  Surgeon: Dereje Simon MD;  Location: [facility] ENDO;  Service: Endoscopy;  Laterality: N/A;  Do not cancel this order         Medications:  Current Outpatient Medications on File Prior to Visit   Medication Sig Dispense Refill    acyclovir (ZOVIRAX) 400 MG tablet Take 1 tablet (400 mg total) by mouth 2 (two) times daily. 60 tablet 11    amoxicillin-clavulanate 500-125mg (AUGMENTIN) 500-125 mg Tab Take 1 tablet (500 mg total) by mouth 2 (two) times daily. for 10 days 20 tablet 0    carvediloL (COREG) 6.25 MG tablet Take 1 tablet (6.25 mg total) by mouth 2 (two) times daily. 180 tablet 3    chlorhexidine (PERIDEX) 0.12 % solution Use as directed 15 mLs in the mouth or throat 2 (two) times daily. for 16 days 473 mL 0    gabapentin (NEURONTIN) 300 MG capsule Take 2 capsules (600 mg total) by mouth 3 (three) times daily. 180 capsule 11    letermovir (PREVYMIS) 480 mg Tab Take 480 mg by mouth Daily. 28 tablet 9    levoFLOXacin (LEVAQUIN) 500 MG tablet Take 1 tablet (500 mg total) by mouth once daily. 30 tablet 11    LIDOcaine (XYLOCAINE) 5 % Oint ointment Apply topically 3 (three) times daily as needed (abdominal pain). 50 g 2    pantoprazole (PROTONIX) 40 MG tablet Take 1 tablet (40 mg total) by mouth once daily. 30 tablet 3    posaconazole  (NOXAFIL) 100 mg TbEC tablet Take 3 tablets (300 mg total) by mouth once daily. 90 tablet 11    traZODone (DESYREL) 50 MG tablet Take 1 tablet (50 mg total) by mouth nightly as needed for Insomnia. 30 tablet 11    vancomycin (VANCOCIN) 125 MG capsule Take 125 mg by mouth 4 (four) times daily.      vancomycin (VANCOCIN) 125 MG capsule Take 1 capsule (125 mg total) by mouth 4 (four) times daily. 120 capsule 0    venetoclax (VENCLEXTA) 100 mg Tab Take 1 tablet (100 mg) by mouth once daily on days 1-7 of a 28-day cycle. Do not discard any remaining tablets. 7 tablet 11    voriconazole (VFEND) 200 MG Tab Take 1 tablet (200 mg total) by mouth 2 (two) times daily. 60 tablet 11     No current facility-administered medications on file prior to visit.       Allergies:  Review of patient's allergies indicates:  No Known Allergies    Exam:  Vitals:    08/20/24 1113   BP: 132/73   Resp: 18   Temp: 98.3 °F (36.8 °C)   SpO2: 97%   Weight: 74.8 kg (164 lb 14.5 oz)       Constitutional: Pleasant 68 y.o. male in no acute distress.  Well nourished. Well groomed.   HEENT: Normocephalic and atraumatic. He has posts in the gum, not appreciated erythema but has dentures in.   Cardiovascular: Upper extremities warm to touch  Lungs: No audible wheezing.  Normal effort.   Musculoskeletal: No gross MSK deformities. Ambulates well  Skin: No rashes appreciated.  Psych: Alert and oriented with appropriate mood and affect.  Neuro:  Grossly normal.    Data Review:  Information obtained from Guillaume Salinas and via chart review.       GI biopsies    Final Diagnosis    A.  Stomach, biopsy-  Chronic gastritis.  Negative for H pylori ([place]).  Negative for intestinal metaplasia.  Negative for dysplasia and malignancy.     B.  Distal esophagus, biopsy-  Squamous mucosa with focal acute inflammation and atypical cells.  See comment  No columnar mucosa present for evaluation.  Negative for intestinal metaplasia.  Negative for dysplasia and  malignancy.           Ganga Rojas MD  Radiation Oncology

## 2024-08-16 ENCOUNTER — OFFICE VISIT (OUTPATIENT)
Dept: HEMATOLOGY/ONCOLOGY | Facility: CLINIC | Age: 69
End: 2024-08-16
Payer: MEDICARE

## 2024-08-16 VITALS
HEART RATE: 93 BPM | BODY MASS INDEX: 24.81 KG/M2 | TEMPERATURE: 99 F | WEIGHT: 163.13 LBS | DIASTOLIC BLOOD PRESSURE: 66 MMHG | OXYGEN SATURATION: 97 % | SYSTOLIC BLOOD PRESSURE: 109 MMHG | RESPIRATION RATE: 18 BRPM

## 2024-08-16 DIAGNOSIS — T45.1X5A CHEMOTHERAPY-INDUCED NAUSEA AND VOMITING: ICD-10-CM

## 2024-08-16 DIAGNOSIS — Z76.82 STEM CELL TRANSPLANT CANDIDATE: ICD-10-CM

## 2024-08-16 DIAGNOSIS — K12.2 ORAL INFECTION: ICD-10-CM

## 2024-08-16 DIAGNOSIS — R11.2 CHEMOTHERAPY-INDUCED NAUSEA AND VOMITING: ICD-10-CM

## 2024-08-16 DIAGNOSIS — D61.818 PANCYTOPENIA: ICD-10-CM

## 2024-08-16 DIAGNOSIS — D49.9 IMMUNODEFICIENCY SECONDARY TO NEOPLASM: ICD-10-CM

## 2024-08-16 DIAGNOSIS — D84.81 IMMUNODEFICIENCY SECONDARY TO NEOPLASM: ICD-10-CM

## 2024-08-16 DIAGNOSIS — D46.9 MYELODYSPLASTIC SYNDROME: Primary | ICD-10-CM

## 2024-08-16 PROCEDURE — 99999 PR PBB SHADOW E&M-EST. PATIENT-LVL IV: CPT | Mod: PBBFAC,,, | Performed by: INTERNAL MEDICINE

## 2024-08-16 RX ORDER — CHLORHEXIDINE GLUCONATE ORAL RINSE 1.2 MG/ML
15 SOLUTION DENTAL 2 TIMES DAILY
Qty: 473 ML | Refills: 0 | Status: SHIPPED | OUTPATIENT
Start: 2024-08-16 | End: 2024-09-01

## 2024-08-16 RX ORDER — AMOXICILLIN AND CLAVULANATE POTASSIUM 500; 125 MG/1; MG/1
1 TABLET, FILM COATED ORAL 2 TIMES DAILY
Qty: 20 TABLET | Refills: 0 | Status: SHIPPED | OUTPATIENT
Start: 2024-08-16 | End: 2024-08-26

## 2024-08-16 NOTE — Clinical Note
Good morning,  Looks like y'all are planning to proceed with transplant next month. He's tentatively scheduled for vidaza during week of 8/26/24. Want me to cancel that, right?  Thanks! Harry

## 2024-08-19 ENCOUNTER — LAB VISIT (OUTPATIENT)
Dept: LAB | Facility: HOSPITAL | Age: 69
End: 2024-08-19
Attending: INTERNAL MEDICINE
Payer: MEDICARE

## 2024-08-19 ENCOUNTER — PATIENT MESSAGE (OUTPATIENT)
Dept: HEMATOLOGY/ONCOLOGY | Facility: CLINIC | Age: 69
End: 2024-08-19
Payer: MEDICARE

## 2024-08-19 ENCOUNTER — TELEPHONE (OUTPATIENT)
Dept: HEMATOLOGY/ONCOLOGY | Facility: CLINIC | Age: 69
End: 2024-08-19
Payer: MEDICARE

## 2024-08-19 DIAGNOSIS — D61.818 PANCYTOPENIA: ICD-10-CM

## 2024-08-19 DIAGNOSIS — D64.9 SYMPTOMATIC ANEMIA: ICD-10-CM

## 2024-08-19 DIAGNOSIS — D46.9 MYELODYSPLASTIC SYNDROME: ICD-10-CM

## 2024-08-19 DIAGNOSIS — D61.818 PANCYTOPENIA: Primary | ICD-10-CM

## 2024-08-19 DIAGNOSIS — Z76.82 STEM CELL TRANSPLANT CANDIDATE: ICD-10-CM

## 2024-08-19 LAB
ABO + RH BLD: NORMAL
ALBUMIN SERPL BCP-MCNC: 3.6 G/DL (ref 3.5–5.2)
ALP SERPL-CCNC: 68 U/L (ref 55–135)
ALT SERPL W/O P-5'-P-CCNC: 16 U/L (ref 10–44)
ANION GAP SERPL CALC-SCNC: 11 MMOL/L (ref 8–16)
ANISOCYTOSIS BLD QL SMEAR: SLIGHT
AST SERPL-CCNC: 14 U/L (ref 10–40)
BASOPHILS # BLD AUTO: 0 K/UL (ref 0–0.2)
BASOPHILS NFR BLD: 0 % (ref 0–1.9)
BILIRUB SERPL-MCNC: 0.3 MG/DL (ref 0.1–1)
BLD GP AB SCN CELLS X3 SERPL QL: NORMAL
BUN SERPL-MCNC: 17 MG/DL (ref 8–23)
CALCIUM SERPL-MCNC: 9.5 MG/DL (ref 8.7–10.5)
CHLORIDE SERPL-SCNC: 108 MMOL/L (ref 95–110)
CO2 SERPL-SCNC: 23 MMOL/L (ref 23–29)
CREAT SERPL-MCNC: 0.9 MG/DL (ref 0.5–1.4)
DIFFERENTIAL METHOD BLD: ABNORMAL
EOSINOPHIL # BLD AUTO: 0 K/UL (ref 0–0.5)
EOSINOPHIL NFR BLD: 1.2 % (ref 0–8)
ERYTHROCYTE [DISTWIDTH] IN BLOOD BY AUTOMATED COUNT: 12.7 % (ref 11.5–14.5)
EST. GFR  (NO RACE VARIABLE): >60 ML/MIN/1.73 M^2
GLUCOSE SERPL-MCNC: 118 MG/DL (ref 70–110)
HCT VFR BLD AUTO: 20.1 % (ref 40–54)
HGB BLD-MCNC: 6.9 G/DL (ref 14–18)
IMM GRANULOCYTES # BLD AUTO: 0 K/UL (ref 0–0.04)
IMM GRANULOCYTES NFR BLD AUTO: 0 % (ref 0–0.5)
LYMPHOCYTES # BLD AUTO: 0.8 K/UL (ref 1–4.8)
LYMPHOCYTES NFR BLD: 95.3 % (ref 18–48)
MAGNESIUM SERPL-MCNC: 2.1 MG/DL (ref 1.6–2.6)
MCH RBC QN AUTO: 30.5 PG (ref 27–31)
MCHC RBC AUTO-ENTMCNC: 34.3 G/DL (ref 32–36)
MCV RBC AUTO: 89 FL (ref 82–98)
MONOCYTES # BLD AUTO: 0 K/UL (ref 0.3–1)
MONOCYTES NFR BLD: 0 % (ref 4–15)
NEUTROPHILS # BLD AUTO: 0 K/UL (ref 1.8–7.7)
NEUTROPHILS NFR BLD: 3.5 % (ref 38–73)
NRBC BLD-RTO: 0 /100 WBC
PHOSPHATE SERPL-MCNC: 4.5 MG/DL (ref 2.7–4.5)
PLATELET # BLD AUTO: 50 K/UL (ref 150–450)
PLATELET BLD QL SMEAR: ABNORMAL
PMV BLD AUTO: 11.4 FL (ref 9.2–12.9)
POTASSIUM SERPL-SCNC: 3.9 MMOL/L (ref 3.5–5.1)
PROT SERPL-MCNC: 7.1 G/DL (ref 6–8.4)
RBC # BLD AUTO: 2.26 M/UL (ref 4.6–6.2)
SODIUM SERPL-SCNC: 142 MMOL/L (ref 136–145)
SPECIMEN OUTDATE: NORMAL
WBC # BLD AUTO: 0.85 K/UL (ref 3.9–12.7)

## 2024-08-19 PROCEDURE — 80053 COMPREHEN METABOLIC PANEL: CPT | Performed by: INTERNAL MEDICINE

## 2024-08-19 PROCEDURE — 85025 COMPLETE CBC W/AUTO DIFF WBC: CPT | Performed by: INTERNAL MEDICINE

## 2024-08-19 PROCEDURE — 86900 BLOOD TYPING SEROLOGIC ABO: CPT | Performed by: INTERNAL MEDICINE

## 2024-08-19 PROCEDURE — P9040 RBC LEUKOREDUCED IRRADIATED: HCPCS | Performed by: INTERNAL MEDICINE

## 2024-08-19 PROCEDURE — 86850 RBC ANTIBODY SCREEN: CPT | Performed by: INTERNAL MEDICINE

## 2024-08-19 PROCEDURE — 86920 COMPATIBILITY TEST SPIN: CPT | Performed by: INTERNAL MEDICINE

## 2024-08-19 PROCEDURE — 83735 ASSAY OF MAGNESIUM: CPT | Performed by: INTERNAL MEDICINE

## 2024-08-19 PROCEDURE — 84100 ASSAY OF PHOSPHORUS: CPT | Performed by: INTERNAL MEDICINE

## 2024-08-19 PROCEDURE — 86901 BLOOD TYPING SEROLOGIC RH(D): CPT | Performed by: INTERNAL MEDICINE

## 2024-08-19 RX ORDER — ACETAMINOPHEN 325 MG/1
650 TABLET ORAL
Status: CANCELLED | OUTPATIENT
Start: 2024-08-19

## 2024-08-19 RX ORDER — HYDROCODONE BITARTRATE AND ACETAMINOPHEN 500; 5 MG/1; MG/1
TABLET ORAL ONCE
Status: CANCELLED | OUTPATIENT
Start: 2024-08-19 | End: 2024-08-19

## 2024-08-20 ENCOUNTER — PROCEDURE VISIT (OUTPATIENT)
Dept: HEMATOLOGY/ONCOLOGY | Facility: CLINIC | Age: 69
End: 2024-08-20
Payer: MEDICARE

## 2024-08-20 ENCOUNTER — HOSPITAL ENCOUNTER (OUTPATIENT)
Dept: RADIATION THERAPY | Facility: HOSPITAL | Age: 69
Discharge: HOME | End: 2024-08-20
Attending: FAMILY MEDICINE
Payer: MEDICARE

## 2024-08-20 ENCOUNTER — OFFICE VISIT (OUTPATIENT)
Dept: RADIATION ONCOLOGY | Facility: CLINIC | Age: 69
End: 2024-08-20
Payer: MEDICARE

## 2024-08-20 ENCOUNTER — PATIENT MESSAGE (OUTPATIENT)
Dept: PALLIATIVE MEDICINE | Facility: CLINIC | Age: 69
End: 2024-08-20
Payer: MEDICARE

## 2024-08-20 VITALS
RESPIRATION RATE: 18 BRPM | DIASTOLIC BLOOD PRESSURE: 73 MMHG | SYSTOLIC BLOOD PRESSURE: 132 MMHG | WEIGHT: 164.88 LBS | BODY MASS INDEX: 25.07 KG/M2 | OXYGEN SATURATION: 97 % | TEMPERATURE: 98 F

## 2024-08-20 VITALS
BODY MASS INDEX: 25.07 KG/M2 | OXYGEN SATURATION: 99 % | TEMPERATURE: 98 F | SYSTOLIC BLOOD PRESSURE: 123 MMHG | RESPIRATION RATE: 16 BRPM | HEART RATE: 75 BPM | HEIGHT: 68 IN | DIASTOLIC BLOOD PRESSURE: 69 MMHG

## 2024-08-20 DIAGNOSIS — D46.9 MYELODYSPLASTIC SYNDROME: ICD-10-CM

## 2024-08-20 DIAGNOSIS — Z76.82 STEM CELL TRANSPLANT CANDIDATE: ICD-10-CM

## 2024-08-20 DIAGNOSIS — D46.9 MYELODYSPLASTIC SYNDROME: Primary | ICD-10-CM

## 2024-08-20 LAB
MDS FISH REASON FOR REFERRAL (BM): NORMAL
REASON FOR REFERRAL (NARRATIVE): NORMAL
SPECIMEN SOURCE: NORMAL
SPECIMEN SOURCE: NORMAL
SPECIMEN TYPE -CHIMERISM SORT: NORMAL

## 2024-08-20 PROCEDURE — 88237 TISSUE CULTURE BONE MARROW: CPT | Performed by: INTERNAL MEDICINE

## 2024-08-20 PROCEDURE — 81450 HL NEO GSAP 5-50DNA/DNA&RNA: CPT | Performed by: INTERNAL MEDICINE

## 2024-08-20 PROCEDURE — 88184 FLOWCYTOMETRY/ TC 1 MARKER: CPT | Performed by: PATHOLOGY

## 2024-08-20 PROCEDURE — 88185 FLOWCYTOMETRY/TC ADD-ON: CPT | Performed by: PATHOLOGY

## 2024-08-20 PROCEDURE — 88305 TISSUE EXAM BY PATHOLOGIST: CPT | Mod: 59 | Performed by: PATHOLOGY

## 2024-08-20 PROCEDURE — 88271 CYTOGENETICS DNA PROBE: CPT | Mod: 59 | Performed by: INTERNAL MEDICINE

## 2024-08-20 PROCEDURE — 88275 CYTOGENETICS 100-300: CPT | Mod: 59 | Performed by: NURSE PRACTITIONER

## 2024-08-20 PROCEDURE — 88299 UNLISTED CYTOGENETIC STUDY: CPT | Performed by: INTERNAL MEDICINE

## 2024-08-20 PROCEDURE — 88313 SPECIAL STAINS GROUP 2: CPT | Mod: 59 | Performed by: PATHOLOGY

## 2024-08-20 PROCEDURE — 88311 DECALCIFY TISSUE: CPT | Performed by: PATHOLOGY

## 2024-08-20 PROCEDURE — 88342 IMHCHEM/IMCYTCHM 1ST ANTB: CPT | Performed by: PATHOLOGY

## 2024-08-20 PROCEDURE — 88275 CYTOGENETICS 100-300: CPT | Mod: 59 | Performed by: INTERNAL MEDICINE

## 2024-08-20 PROCEDURE — 88341 IMHCHEM/IMCYTCHM EA ADD ANTB: CPT | Performed by: PATHOLOGY

## 2024-08-20 PROCEDURE — 88271 CYTOGENETICS DNA PROBE: CPT | Performed by: NURSE PRACTITIONER

## 2024-08-20 PROCEDURE — 99999 PR PBB SHADOW E&M-EST. PATIENT-LVL III: CPT | Mod: PBBFAC,,, | Performed by: STUDENT IN AN ORGANIZED HEALTH CARE EDUCATION/TRAINING PROGRAM

## 2024-08-20 RX ORDER — LIDOCAINE HYDROCHLORIDE 20 MG/ML
8 INJECTION, SOLUTION INFILTRATION; PERINEURAL
Status: COMPLETED | OUTPATIENT
Start: 2024-08-20 | End: 2024-08-20

## 2024-08-20 RX ADMIN — LIDOCAINE HYDROCHLORIDE 8 ML: 20 INJECTION, SOLUTION INFILTRATION; PERINEURAL at 02:08

## 2024-08-20 NOTE — Clinical Note
Re consented for TBI. Know he is going for repeat BMBx today. Well tentatively be ready for 9/9. He did complain of dental pain. Think Dr. Diamond put him on abx, but did recommend he get this evaluated by a dentist asap. Unsure if that will affect transplant.

## 2024-08-20 NOTE — PROCEDURES
"Guillaume Salinas is a 68 y.o. male patient.    Temp: 97.8 °F (36.6 °C) (08/20/24 1408)  Pulse: 75 (08/20/24 1408)  Resp: 16 (08/20/24 1408)  BP: 123/69 (08/20/24 1408)  SpO2: 99 % (08/20/24 1408)  Height: 5' 8" (172.7 cm) (08/20/24 1408)       Bone marrow    Date/Time: 8/20/2024 2:00 PM    Performed by: Judy Anthony NP  Authorized by: Mohit Hickey MD    Consent Done?: Yes (Written)   Immediately prior to procedure a time out was called to verify the correct patient, procedure, equipment, support staff and site/side marked as required. .      Position: prone  Aspiration?: Yes   Biopsy?: Yes        8/20/2024  PROCEDURE NOTE:  Date of Procedure: 08/20/2024  Bone Marrow Biopsy and Aspiration  Indication: MDS  Consent: Informed consent was obtained from patient.  Timeout: Done and documented. Allergies reviewed.  Position: prone  Site: Left posterior illiac crest.  Prep: Betadine.  Needle used: 11 gauge Jamshidi needle.  Anesthetic: 2% lidocaine 8 cc.  Biopsy: The biopsy needle was introduced into the marrow cavity and an aspirate was obtained without complications and sent for flow cytometry, AML fish, NGS, DNA/RNA hold, and cytogenetics. Core biopsy obtained without difficulty and sent for routine histologic examination.  Complications: None.  Disposition: The patient was placed supine for 15min following procedure. MA to assess bandaid for bleeding prior to discharge home. Patient aware to keep bandaid dry and intact for 24hrs and to call clinic for any bleeding, fevers, pain, or signs of infection.  Blood loss: Minimal.     Judy Anthony NP  Hematology/Oncology/BMT          Wife present as  per patient request. Interpretor offered and declined    "

## 2024-08-21 ENCOUNTER — INFUSION (OUTPATIENT)
Dept: INFUSION THERAPY | Facility: HOSPITAL | Age: 69
End: 2024-08-21
Attending: INTERNAL MEDICINE
Payer: MEDICARE

## 2024-08-21 VITALS
TEMPERATURE: 98 F | DIASTOLIC BLOOD PRESSURE: 59 MMHG | HEART RATE: 60 BPM | OXYGEN SATURATION: 99 % | RESPIRATION RATE: 17 BRPM | SYSTOLIC BLOOD PRESSURE: 114 MMHG

## 2024-08-21 DIAGNOSIS — D69.6 THROMBOCYTOPENIA: ICD-10-CM

## 2024-08-21 DIAGNOSIS — D61.818 PANCYTOPENIA: ICD-10-CM

## 2024-08-21 PROCEDURE — 36430 TRANSFUSION BLD/BLD COMPNT: CPT

## 2024-08-21 RX ORDER — DIPHENHYDRAMINE HCL 25 MG
25 CAPSULE ORAL
Status: DISCONTINUED | OUTPATIENT
Start: 2024-08-21 | End: 2024-08-21 | Stop reason: HOSPADM

## 2024-08-21 RX ORDER — HYDROCODONE BITARTRATE AND ACETAMINOPHEN 500; 5 MG/1; MG/1
TABLET ORAL ONCE
Status: DISCONTINUED | OUTPATIENT
Start: 2024-08-21 | End: 2024-08-21 | Stop reason: HOSPADM

## 2024-08-21 RX ORDER — ACETAMINOPHEN 325 MG/1
650 TABLET ORAL
Status: DISCONTINUED | OUTPATIENT
Start: 2024-08-21 | End: 2024-08-21 | Stop reason: HOSPADM

## 2024-08-22 ENCOUNTER — LAB VISIT (OUTPATIENT)
Dept: LAB | Facility: HOSPITAL | Age: 69
End: 2024-08-22
Attending: INTERNAL MEDICINE
Payer: MEDICARE

## 2024-08-22 ENCOUNTER — TELEPHONE (OUTPATIENT)
Dept: HEMATOLOGY/ONCOLOGY | Facility: CLINIC | Age: 69
End: 2024-08-22
Payer: MEDICARE

## 2024-08-22 ENCOUNTER — TELEPHONE (OUTPATIENT)
Dept: REHABILITATION | Facility: HOSPITAL | Age: 69
End: 2024-08-22
Payer: MEDICARE

## 2024-08-22 DIAGNOSIS — D46.9 MYELODYSPLASTIC SYNDROME: ICD-10-CM

## 2024-08-22 DIAGNOSIS — Z76.82 STEM CELL TRANSPLANT CANDIDATE: ICD-10-CM

## 2024-08-22 DIAGNOSIS — D64.9 SYMPTOMATIC ANEMIA: ICD-10-CM

## 2024-08-22 LAB
ABO + RH BLD: NORMAL
ALBUMIN SERPL BCP-MCNC: 3.4 G/DL (ref 3.5–5.2)
ALP SERPL-CCNC: 72 U/L (ref 55–135)
ALT SERPL W/O P-5'-P-CCNC: 21 U/L (ref 10–44)
AML FISH REASON FOR REFERRAL (BM): NORMAL
ANION GAP SERPL CALC-SCNC: 8 MMOL/L (ref 8–16)
ANISOCYTOSIS BLD QL SMEAR: SLIGHT
ANNOTATION COMMENT IMP: NORMAL
AST SERPL-CCNC: 22 U/L (ref 10–40)
BASOPHILS NFR BLD: 0 % (ref 0–1.9)
BILIRUB SERPL-MCNC: 0.3 MG/DL (ref 0.1–1)
BLD GP AB SCN CELLS X3 SERPL QL: NORMAL
BUN SERPL-MCNC: 14 MG/DL (ref 8–23)
CALCIUM SERPL-MCNC: 9.1 MG/DL (ref 8.7–10.5)
CELLS W CYTOGENETIC ABNL BLD/T: NORMAL
CHLORIDE SERPL-SCNC: 109 MMOL/L (ref 95–110)
CHROM ANALY RESULT (ISCN): NORMAL
CLINICAL CYTOGENETICIST REVIEW: NORMAL
CO2 SERPL-SCNC: 26 MMOL/L (ref 23–29)
CREAT SERPL-MCNC: 0.8 MG/DL (ref 0.5–1.4)
DIFFERENTIAL METHOD BLD: ABNORMAL
EOSINOPHIL NFR BLD: 1 % (ref 0–8)
ERYTHROCYTE [DISTWIDTH] IN BLOOD BY AUTOMATED COUNT: 13 % (ref 11.5–14.5)
EST. GFR  (NO RACE VARIABLE): >60 ML/MIN/1.73 M^2
GLUCOSE SERPL-MCNC: 103 MG/DL (ref 70–110)
HCT VFR BLD AUTO: 22.4 % (ref 40–54)
HGB BLD-MCNC: 7.7 G/DL (ref 14–18)
HYPOCHROMIA BLD QL SMEAR: ABNORMAL
IMM GRANULOCYTES # BLD AUTO: ABNORMAL K/UL (ref 0–0.04)
IMM GRANULOCYTES NFR BLD AUTO: ABNORMAL % (ref 0–0.5)
LAB TEST METHOD: NORMAL
LYMPHOCYTES NFR BLD: 92 % (ref 18–48)
MAGNESIUM SERPL-MCNC: 2.1 MG/DL (ref 1.6–2.6)
MCH RBC QN AUTO: 30.8 PG (ref 27–31)
MCHC RBC AUTO-ENTMCNC: 34.4 G/DL (ref 32–36)
MCV RBC AUTO: 90 FL (ref 82–98)
MOL DX INTERP BLD/T QL: NORMAL
MONOCYTES NFR BLD: 0 % (ref 4–15)
NEUTROPHILS NFR BLD: 7 % (ref 38–73)
NRBC BLD-RTO: 0 /100 WBC
PHOSPHATE SERPL-MCNC: 3.8 MG/DL (ref 2.7–4.5)
PLATELET # BLD AUTO: 43 K/UL (ref 150–450)
PLATELET BLD QL SMEAR: ABNORMAL
PMV BLD AUTO: 10.6 FL (ref 9.2–12.9)
POTASSIUM SERPL-SCNC: 3.9 MMOL/L (ref 3.5–5.1)
PROT SERPL-MCNC: 6.6 G/DL (ref 6–8.4)
PROVIDER SIGNING NAME: NORMAL
RBC # BLD AUTO: 2.5 M/UL (ref 4.6–6.2)
SODIUM SERPL-SCNC: 143 MMOL/L (ref 136–145)
SPECIMEN OUTDATE: NORMAL
SPECIMEN SOURCE: NORMAL
TEST PERFORMANCE INFO SPEC: NORMAL
WBC # BLD AUTO: 0.78 K/UL (ref 3.9–12.7)

## 2024-08-22 PROCEDURE — 36415 COLL VENOUS BLD VENIPUNCTURE: CPT | Performed by: INTERNAL MEDICINE

## 2024-08-22 PROCEDURE — 85007 BL SMEAR W/DIFF WBC COUNT: CPT | Performed by: INTERNAL MEDICINE

## 2024-08-22 PROCEDURE — 84100 ASSAY OF PHOSPHORUS: CPT | Performed by: INTERNAL MEDICINE

## 2024-08-22 PROCEDURE — 83735 ASSAY OF MAGNESIUM: CPT | Performed by: INTERNAL MEDICINE

## 2024-08-22 PROCEDURE — 85027 COMPLETE CBC AUTOMATED: CPT | Performed by: INTERNAL MEDICINE

## 2024-08-22 PROCEDURE — 86850 RBC ANTIBODY SCREEN: CPT | Mod: NBTX | Performed by: INTERNAL MEDICINE

## 2024-08-22 PROCEDURE — 80053 COMPREHEN METABOLIC PANEL: CPT | Performed by: INTERNAL MEDICINE

## 2024-08-22 PROCEDURE — 86900 BLOOD TYPING SEROLOGIC ABO: CPT | Performed by: INTERNAL MEDICINE

## 2024-08-22 PROCEDURE — 86901 BLOOD TYPING SEROLOGIC RH(D): CPT | Mod: NBTX | Performed by: INTERNAL MEDICINE

## 2024-08-22 NOTE — TELEPHONE ENCOUNTER
SPT spoke with wife regarding continued low H&H levels that will preclude physical therapy at this time. Wife reports understanding that PT visit tomorrow will be cancelled.    Lily An, PT, DPT   uncorrected

## 2024-08-23 DIAGNOSIS — D46.9 MYELODYSPLASTIC SYNDROME: ICD-10-CM

## 2024-08-23 DIAGNOSIS — Z76.82 STEM CELL TRANSPLANT CANDIDATE: Primary | ICD-10-CM

## 2024-08-24 LAB
BODY SITE - BONE MARROW: NORMAL
CLINICAL DIAGNOSIS - BONE MARROW: NORMAL
COMMENT: NORMAL
DNA/RNA EXTRACT AND HOLD RESULT: NORMAL
DNA/RNA EXTRACTION: NORMAL
EXHR SPECIMEN TYPE: NORMAL
FINAL PATHOLOGIC DIAGNOSIS: NORMAL
FLOW CYTOMETRY ANTIBODIES ANALYZED - BONE MARROW: NORMAL
FLOW CYTOMETRY COMMENT - BONE MARROW: NORMAL
FLOW CYTOMETRY INTERPRETATION - BONE MARROW: NORMAL
GROSS: NORMAL
Lab: NORMAL
MICROSCOPIC EXAM: NORMAL

## 2024-08-26 ENCOUNTER — LAB VISIT (OUTPATIENT)
Dept: LAB | Facility: HOSPITAL | Age: 69
End: 2024-08-26
Attending: INTERNAL MEDICINE
Payer: MEDICARE

## 2024-08-26 ENCOUNTER — DOCUMENTATION ONLY (OUTPATIENT)
Dept: HEMATOLOGY/ONCOLOGY | Facility: CLINIC | Age: 69
End: 2024-08-26
Payer: MEDICARE

## 2024-08-26 DIAGNOSIS — D46.9 MYELODYSPLASTIC SYNDROME: ICD-10-CM

## 2024-08-26 LAB
ALBUMIN SERPL BCP-MCNC: 3.5 G/DL (ref 3.5–5.2)
ALP SERPL-CCNC: 81 U/L (ref 55–135)
ALT SERPL W/O P-5'-P-CCNC: 25 U/L (ref 10–44)
ANION GAP SERPL CALC-SCNC: 8 MMOL/L (ref 8–16)
AST SERPL-CCNC: 18 U/L (ref 10–40)
BASOPHILS NFR BLD: 0 % (ref 0–1.9)
BILIRUB SERPL-MCNC: 0.2 MG/DL (ref 0.1–1)
BUN SERPL-MCNC: 20 MG/DL (ref 8–23)
CALCIUM SERPL-MCNC: 9.4 MG/DL (ref 8.7–10.5)
CHLORIDE SERPL-SCNC: 108 MMOL/L (ref 95–110)
CO2 SERPL-SCNC: 25 MMOL/L (ref 23–29)
CREAT SERPL-MCNC: 0.8 MG/DL (ref 0.5–1.4)
DIFFERENTIAL METHOD BLD: ABNORMAL
EOSINOPHIL NFR BLD: 0 % (ref 0–8)
ERYTHROCYTE [DISTWIDTH] IN BLOOD BY AUTOMATED COUNT: 12.8 % (ref 11.5–14.5)
EST. GFR  (NO RACE VARIABLE): >60 ML/MIN/1.73 M^2
GLUCOSE SERPL-MCNC: 121 MG/DL (ref 70–110)
HCT VFR BLD AUTO: 20.8 % (ref 40–54)
HGB BLD-MCNC: 7.2 G/DL (ref 14–18)
IMM GRANULOCYTES # BLD AUTO: ABNORMAL K/UL (ref 0–0.04)
IMM GRANULOCYTES NFR BLD AUTO: ABNORMAL % (ref 0–0.5)
LYMPHOCYTES NFR BLD: 62 % (ref 18–48)
MCH RBC QN AUTO: 31.2 PG (ref 27–31)
MCHC RBC AUTO-ENTMCNC: 34.6 G/DL (ref 32–36)
MCV RBC AUTO: 90 FL (ref 82–98)
MONOCYTES NFR BLD: 1 % (ref 4–15)
NEUTROPHILS NFR BLD: 6 % (ref 38–73)
NRBC BLD-RTO: 0 /100 WBC
PLATELET # BLD AUTO: 36 K/UL (ref 150–450)
PLATELET BLD QL SMEAR: ABNORMAL
PMV BLD AUTO: 11.5 FL (ref 9.2–12.9)
POTASSIUM SERPL-SCNC: 4 MMOL/L (ref 3.5–5.1)
PROT SERPL-MCNC: 6.5 G/DL (ref 6–8.4)
RBC # BLD AUTO: 2.31 M/UL (ref 4.6–6.2)
SODIUM SERPL-SCNC: 141 MMOL/L (ref 136–145)
WBC # BLD AUTO: 1.02 K/UL (ref 3.9–12.7)

## 2024-08-26 PROCEDURE — 85027 COMPLETE CBC AUTOMATED: CPT | Performed by: INTERNAL MEDICINE

## 2024-08-26 PROCEDURE — 36415 COLL VENOUS BLD VENIPUNCTURE: CPT | Performed by: INTERNAL MEDICINE

## 2024-08-26 PROCEDURE — 85007 BL SMEAR W/DIFF WBC COUNT: CPT | Performed by: INTERNAL MEDICINE

## 2024-08-26 PROCEDURE — 80053 COMPREHEN METABOLIC PANEL: CPT | Performed by: INTERNAL MEDICINE

## 2024-08-27 ENCOUNTER — PATIENT MESSAGE (OUTPATIENT)
Dept: HEMATOLOGY/ONCOLOGY | Facility: CLINIC | Age: 69
End: 2024-08-27

## 2024-08-27 ENCOUNTER — HOSPITAL ENCOUNTER (OUTPATIENT)
Dept: PULMONOLOGY | Facility: CLINIC | Age: 69
Discharge: HOME | End: 2024-08-27
Payer: MEDICARE

## 2024-08-27 ENCOUNTER — CLINICAL SUPPORT (OUTPATIENT)
Dept: HEMATOLOGY/ONCOLOGY | Facility: CLINIC | Age: 69
End: 2024-08-27
Payer: MEDICARE

## 2024-08-27 ENCOUNTER — HOSPITAL ENCOUNTER (OUTPATIENT)
Dept: RADIOLOGY | Facility: HOSPITAL | Age: 69
Discharge: HOME | End: 2024-08-27
Attending: INTERNAL MEDICINE
Payer: MEDICARE

## 2024-08-27 ENCOUNTER — HOSPITAL ENCOUNTER (OUTPATIENT)
Dept: CARDIOLOGY | Facility: CLINIC | Age: 69
Discharge: HOME | End: 2024-08-27
Payer: MEDICARE

## 2024-08-27 ENCOUNTER — DOCUMENTATION ONLY (OUTPATIENT)
Dept: HEMATOLOGY/ONCOLOGY | Facility: CLINIC | Age: 69
End: 2024-08-27

## 2024-08-27 ENCOUNTER — HOSPITAL ENCOUNTER (OUTPATIENT)
Dept: CARDIOLOGY | Facility: HOSPITAL | Age: 69
Discharge: HOME | End: 2024-08-27
Attending: INTERNAL MEDICINE
Payer: MEDICARE

## 2024-08-27 VITALS
WEIGHT: 163.13 LBS | SYSTOLIC BLOOD PRESSURE: 114 MMHG | BODY MASS INDEX: 24.72 KG/M2 | HEIGHT: 68 IN | HEART RATE: 60 BPM | DIASTOLIC BLOOD PRESSURE: 59 MMHG

## 2024-08-27 DIAGNOSIS — D46.9 MYELODYSPLASTIC SYNDROME: ICD-10-CM

## 2024-08-27 DIAGNOSIS — D46.9 MYELODYSPLASTIC SYNDROME: Primary | ICD-10-CM

## 2024-08-27 DIAGNOSIS — Z76.82 STEM CELL TRANSPLANT CANDIDATE: ICD-10-CM

## 2024-08-27 DIAGNOSIS — Z76.82 STEM CELL TRANSPLANT CANDIDATE: Primary | ICD-10-CM

## 2024-08-27 LAB
ASCENDING AORTA: 3.03 CM
AV INDEX (PROSTH): 1.05
AV MEAN GRADIENT: 2 MMHG
AV PEAK GRADIENT: 5 MMHG
AV VALVE AREA BY VELOCITY RATIO: 3.47 CM²
AV VALVE AREA: 3.83 CM²
AV VELOCITY RATIO: 0.95
BSA FOR ECHO PROCEDURE: 1.88 M2
CV ECHO LV RWT: 0.38 CM
DOP CALC AO PEAK VEL: 1.12 M/S
DOP CALC AO VTI: 21.63 CM
DOP CALC LVOT AREA: 3.7 CM2
DOP CALC LVOT DIAMETER: 2.16 CM
DOP CALC LVOT PEAK VEL: 1.06 M/S
DOP CALC LVOT STROKE VOLUME: 82.81 CM3
DOP CALCLVOT PEAK VEL VTI: 22.61 CM
E WAVE DECELERATION TIME: 240.91 MSEC
E/A RATIO: 0.71
E/E' RATIO: 6.53 M/S
ECHO LV POSTERIOR WALL: 0.9 CM (ref 0.6–1.1)
FRACTIONAL SHORTENING: 32 % (ref 28–44)
INTERVENTRICULAR SEPTUM: 0.82 CM (ref 0.6–1.1)
IVRT: 76.12 MSEC
LA MAJOR: 5.23 CM
LA MINOR: 5.39 CM
LA WIDTH: 3.53 CM
LEFT ATRIUM SIZE: 3.44 CM
LEFT ATRIUM VOLUME INDEX MOD: 17.1 ML/M2
LEFT ATRIUM VOLUME INDEX: 29.3 ML/M2
LEFT ATRIUM VOLUME MOD: 31.97 CM3
LEFT ATRIUM VOLUME: 54.8 CM3
LEFT INTERNAL DIMENSION IN SYSTOLE: 3.24 CM (ref 2.1–4)
LEFT VENTRICLE DIASTOLIC VOLUME INDEX: 56.12 ML/M2
LEFT VENTRICLE DIASTOLIC VOLUME: 104.94 ML
LEFT VENTRICLE MASS INDEX: 73 G/M2
LEFT VENTRICLE SYSTOLIC VOLUME INDEX: 22.6 ML/M2
LEFT VENTRICLE SYSTOLIC VOLUME: 42.18 ML
LEFT VENTRICULAR INTERNAL DIMENSION IN DIASTOLE: 4.75 CM (ref 3.5–6)
LEFT VENTRICULAR MASS: 136.77 G
LV LATERAL E/E' RATIO: 5.17 M/S
LV SEPTAL E/E' RATIO: 8.86 M/S
MV A" WAVE DURATION": 22.45 MSEC
MV PEAK A VEL: 0.87 M/S
MV PEAK E VEL: 0.62 M/S
OHS QRS DURATION: 76 MS
OHS QTC CALCULATION: 403 MS
PISA MRMAX VEL: 0.04 M/S
PISA TR MAX VEL: 2.49 M/S
PULM VEIN S/D RATIO: 1.44
PV PEAK D VEL: 0.5 M/S
PV PEAK S VEL: 0.72 M/S
RA MAJOR: 4.98 CM
RA PRESSURE ESTIMATED: 3 MMHG
RA WIDTH: 3.84 CM
RIGHT VENTRICLE DIASTOLIC BASEL DIMENSION: 3.7 CM
RV TB RVSP: 5 MMHG
SINUS: 3.21 CM
STJ: 2.71 CM
TDI LATERAL: 0.12 M/S
TDI SEPTAL: 0.07 M/S
TDI: 0.1 M/S
TR MAX PG: 25 MMHG
TRICUSPID ANNULAR PLANE SYSTOLIC EXCURSION: 2.17 CM
TV REST PULMONARY ARTERY PRESSURE: 28 MMHG
Z-SCORE OF LEFT VENTRICULAR DIMENSION IN END DIASTOLE: -0.93
Z-SCORE OF LEFT VENTRICULAR DIMENSION IN END SYSTOLE: 0.06

## 2024-08-27 PROCEDURE — 71046 X-RAY EXAM CHEST 2 VIEWS: CPT | Mod: TC,FY

## 2024-08-27 PROCEDURE — 93306 TTE W/DOPPLER COMPLETE: CPT

## 2024-08-27 NOTE — PROGRESS NOTES
[facility]   Bone Marrow Transplant Psychosocial Assessment   Date: 2024       Demographic Information     Name: Guillaume Salinas    : 1955    Age: 69 y.o.    Sex: male    Race: White    Marital Status:    SS #:     Phone Number(s): [phone] (home)     Home Address: [address] [place] [address]    Mailing Address: [address] [place] [address]    Are you a [place] Citizen? No   If no, please explain: Permanent Resident       Contact Information     Next of Kin: Yennifer Thomas   Relationship: Spouse   Phone Number(s): [phone]   Emergency Contact: Extended Emergency Contact Information  Primary Emergency Contact: Yennifer Thomas  Mobile Phone: [phone]  Relation: Spouse  Preferred language: English   needed? No  Secondary Emergency Contact: Susy Cordova  Mobile Phone: [phone]  Relation: Daughter        Living Arrangements   Household Composition:  Patient currently resides with: Spouse   If patient resides with spouse, please explain the marital relationship: Wife   Does the patient currently own his/her own home? Yes   Does the patient currently rent home/apartment: No   Are current living arrangements permanent? Yes   If no, please explain: N/A       Children's Names     Name Sex Age   1. Segundo Meyer Male 44   2. Jennifer Meyer Female 39   3. Susy Meyer Female 36   4.     5.       Who will be the primary caregiver for the children when patient is admitted to the hospital? NA   Name: N/A   Phone Number:  N/A       Support System   Primary Caregiver:     Name: Yennifer Thomas   Relationship: Spouse   Cell #: [phone]   Address: [address] [place] [address]   Home #: [phone]   City: [facility]: [place]   ZIP: [id number]       Secondary Caregiver:     Name: Jose Alberto William   Relationship: Brother-in-Law   Cell #: [phone]   Address: [address] [place] [address]   Home #: [phone]   City:  [facility]: [place]   ZIP: [id number]     Will patient's caregiver be available full-time? Yes   What is the patient's Congregation? [employer]   Is patient currently practicing or non-practicing? Yes      Does patient have any other sources for support? Yes      If yes, please explain: Support from friends and family.       Post BMT Plans      Does patient have full understanding of recovery from BMT? Yes   Does patient understand risk associated with BMT? Yes   What are patient's housing plans post BMT? Own home   Does patient have a Living Will? No   Does patient have a Power of ?  No   If yes, please give name of POA:  Name: N/A    Phone #: N/A       Employment Information     Is patient currently employed? No   Employer: Retired   Phone #: N/A   Position: N/A   Full Time/Part Time? N/A   YRS: N/A       Secondary Employment Information     Employer: N/A   Phone #: N/A   Position: N/A   Full Time/Part Time? N/A   YRS: N/A       Significant Other Employment Information     Employer: [employer]   Phone #: [phone]   Position: Social Skills educator   Full Time/Part Time? PRN   YRS: 47         Financial Information     Monthly Income: $800.00   Yearly Income: $9600.00   Source of Income:  social security   Do you have any financial concerns? Yes   If yes, please explain:  Copays        Insurance Information      Do you have health insurance?  Yes   Insurance [employer]    Policy #: [id number]   Group #: [id number]   Policy Guzmán: Brad Meyer   Medicare: Yes   Medicare Part D: No   Medicaid: No   Do you have Disability Insurance? No      Do you have a Cancer Policy? No   Do you have medication/prescription coverage? Yes   Are you a ? No       Medical Information     Diagnosis: Myelodysplastic (MDS)   Date of Diagnosis: 05/03/2023   Is this a new diagnosis? Yes         If no, please explain: N/A   Past Medical History: Past Medical History:   Diagnosis Date     Anticoagulant long-term use     Coronary artery disease     Hypertension     Myelodysplastic syndrome     Peripheral vascular disease, unspecified       Infusion Services: None   Home Health: None   Durable Medical Equipment: None   Activities of Daily Living: The patient is self sufficient with his ADL's   Patient's Family Cancer History: Cancer-related family history includes Cancer in his brother and father.       Cognitive Functioning     Cognitive State: The patient is alert and orientated times four.   Does patient have any concerns that may affect medical follow up and full understanding of treatment? No   Does patient have any concerns that may impact medication compliance? No   Education Level: college graduate   Does the patient have any learning disabilities? No   If yes, please explain: N/A   Can the patient read English? No   Can the patient write in English? No      Is the patient Literate? No   What is the patient's primary language? Croatian   Does the patient need interpretation services? Yes        Psychosocial History     Does patient have any emotional issues? No   If, yes please explain:  N/A   Does patient have a psychiatric history? No   If, yes please explain:  N/A   Is patient currently taking any psychiatric medication? No   If yes, please list medications: N/A   Is patient currently in therapy or attending support groups? No   Where is the patient currently in therapy? N/A   Therapist/Counselor Name: N/A   Therapist/Counselor Phone #: N/A       Alcohol/Drug Use/Abuse History     Alcohol Use: Social History     Substance and Sexual Activity   Alcohol Use Not Currently      Tobacco Use: Social History     Tobacco Use   Smoking Status Former    Current packs/day: 0.00    Average packs/day: 0.3 packs/day for 50.0 years (12.5 ttl pk-yrs)    Types: Cigarettes    Start date: 3/1/1973    Quit date: 3/1/2023    Years since quittin.4    Passive exposure: Past   Smokeless Tobacco Never      Drug  "Use: Social History     Substance and Sexual Activity   Drug Use Never          Coping Skills     How is the patient currently coping with their diagnosis? The patient stated that he is coping through his confidence in GOD, and his treatment team along with the support of his family. The patient stated that his coping mechanism is being around his spouse, and two grandchildren.   Is the patient open/receptive to psychosocial intervention? Yes   Has the patient experienced any significant losses in his/her life? Yes      If yes, please explain: The patient stated that his sister-in-law passed two years ago, and the family supported her until she passed.   What are the patient's identified needs? The patient's concerns are regarding copay assistance, and the cost of medications.   Goals: The patient stated, "I want this treatment to help me recover, as I will leave my recovery in the physicians' hands".   Interview Behavior: The patient was calm and cooperative through the assessment process. The patient was clear and direct with his answers.   Suitability for Transplant: The patient is suitable for transplant as evidenced by his commitment to follow the care plan of his physicians, and the strong support that the patient's family will provide by rearrange their lives in support of the patient during the treatment process.   Additional Comments: The patient and his spouse/caregiver with family members are all prepared to start treatment. The patient will require an interrupter during all phases of treatment.       "

## 2024-08-27 NOTE — PROGRESS NOTES
BMT Pharmacist Evaluation      Current Outpatient Medications:     acyclovir (ZOVIRAX) 400 MG tablet, Take 1 tablet (400 mg total) by mouth 2 (two) times daily., Disp: 60 tablet, Rfl: 11    carvediloL (COREG) 6.25 MG tablet, Take 1 tablet (6.25 mg total) by mouth 2 (two) times daily., Disp: 180 tablet, Rfl: 3    chlorhexidine (PERIDEX) 0.12 % solution, Use as directed 15 mLs in the mouth or throat 2 (two) times daily. for 16 days, Disp: 473 mL, Rfl: 0    gabapentin (NEURONTIN) 300 MG capsule, Take 2 capsules (600 mg total) by mouth 3 (three) times daily., Disp: 180 capsule, Rfl: 11    levoFLOXacin (LEVAQUIN) 500 MG tablet, Take 1 tablet (500 mg total) by mouth once daily., Disp: 30 tablet, Rfl: 11    LIDOcaine (XYLOCAINE) 5 % Oint ointment, Apply topically 3 (three) times daily as needed (abdominal pain)., Disp: 50 g, Rfl: 2    traZODone (DESYREL) 50 MG tablet, Take 1 tablet (50 mg total) by mouth nightly as needed for Insomnia., Disp: 30 tablet, Rfl: 11    vancomycin (VANCOCIN) 125 MG capsule, Take 1 capsule (125 mg total) by mouth 4 (four) times daily., Disp: 120 capsule, Rfl: 0    voriconazole (VFEND) 200 MG Tab, Take 1 tablet (200 mg total) by mouth 2 (two) times daily., Disp: 60 tablet, Rfl: 11    letermovir (PREVYMIS) 480 mg Tab, Take 480 mg by mouth Daily., Disp: 28 tablet, Rfl: 9    pantoprazole (PROTONIX) 40 MG tablet, Take 1 tablet (40 mg total) by mouth once daily. (Patient not taking: Reported on 8/27/2024), Disp: 30 tablet, Rfl: 3    posaconazole (NOXAFIL) 100 mg TbEC tablet, Take 3 tablets (300 mg total) by mouth once daily. (Patient not taking: Reported on 8/27/2024), Disp: 90 tablet, Rfl: 11    venetoclax (VENCLEXTA) 100 mg Tab, Take 1 tablet (100 mg) by mouth once daily on days 1-7 of a 28-day cycle. Do not discard any remaining tablets. (Patient not taking: Reported on 8/27/2024.), Disp: 7 tablet, Rfl: 11      Review of patient's allergies indicates:  No Known Allergies      Estimated Creatinine  Clearance: 84.3 mL/min (based on SCr of 0.8 mg/dL).       Medication adherence: He expresses that he is good in adherence, utilizing a weekly pillbox and reminders from his phone that keep him in check on when to take his medications.  Medication-related problems:  He reports he only has experienced redness on his abdomen due to the subcutaneous injections.        Planned conditioning regimen:  Fludarabine on Day -5, -4, -3, and -2  Melphalan on Day -2  REST DAY on Day -1     Planned  GVHD Prophylaxis:  Cyclophosphamide (with [place]) on Days +3 and +4  Mycophenolate starting on Day +5  Tacrolimus starting on Day +5    Antimicrobial Prophylaxis:  Acyclovir starting on Day -5  Levofloxacin starting on Day -1  Micafungin on Day -1 through Day +4  Letermovir starting on Day +5 (if CMV PCR drawn on day 0 results negative)  Posaconazole starting on Day +5  Bactrim starting on Day +30    SOS/VOD Prophylaxis:  Ursodiol on Day -5 through Day +30     Growth Factor Support:  Neupogen starting on Day +5     Caregiver: wife  Post-transplant discharge plans: home     Notes: The patient was provided with education in Belgian for their benefit.    Reviewed and reconciled the medication list with the patient and wife. Patient was able to confirm all medications he currently takes including schedule and indication. Patient demonstrates excellent medication adherence and understands the importance of this through the transplant process.     Reviewed the planned high-dose chemotherapy regimen, including schedule and possible side effects. Provided the patient with drug information handouts for chemotherapy and GVHD prophylaxis. Reviewed possible side effects of transplant including: neutropenia, thrombocytopenia, anemia, infection, infusion reactions, rash, nausea/vomiting, mucositis, loss of appetite, diarrhea, neuropathy, hair loss, bladder irritation, liver and/or renal dysfunction. Also discussed the rare side effects of  neurotoxicity (ranging from confusion to seizures), hemolytic anemia, sinusoidal obstruction syndrome (SOS/VOD), and hyponatremia (SIADH). Reviewed prophylactic antimicrobials, as well as prophylactic and as needed antiemetics. Encouraged the patient to report all possible side effects/new symptoms and to ask for supportive care medications if needed. Patient verbalized understanding and all questions were answered.    Pharmacy Notes/Proposed recommendations:  - The patient has a history of esophagitis and was prescribed pantoprazole by a gastroenterologist. It is recommended that this medication be continued upon admission and discharge.    - It was explained to the patient that his antifungal medication will be changed from voriconazole to posaconazole during the transplant, and he will need to remain on it as long as he is on tacrolimus.    - The patient takes trazodone nightly for improved sleep, which has been greatly beneficial. As he already takes voriconazole, a strong CYP 3A4 inhibitor, no dose adjustment is foreseen.    - Tacrolimus has already been dose-reduced for posaconazole.    - There is no history of shingles or invasive fungal infections.    - The patient has experienced C. difficile diarrhea in the past and has used oral vancomycin. He understands that vancomycin prophylaxis will continue during and after transplant. Please refer to Dr. Samuel's progress note from 24.    - He is still on treatment reason why it was not removed from his medication list.     - The patient understands that a higher dose of acyclovir will be needed for one-year post-transplant, and he will start taking Bactrim at day +30, continuing until off immunosuppression.    Drug Interactions:   - Cat D trazodone- posaconazole: Strong CY Inhibitors (posaconazole) may increase the serum concentration of TraZODone.     - Cat D tacrolimus-posaconazole: Posaconazole may increase the serum concentration of Tacrolimus  (Systemic).    - Cat D pantoprazole-posaconazole: Inhibitors of the Proton Pump (PPIs and PCABs) may decrease the serum concentration of Posaconazole.       The patient demonstrates good medication adherence and understanding of the chemotherapy and transplant plan. BMT/Hematology Oncology PharmD will continue to follow the patient while admitted to the inpatient unit.     Terese Cantor, PharmD  Clinical Pharmacy Specialist, Bone Marrow Transplant/Hematology  Spectra link: [phone]

## 2024-08-28 ENCOUNTER — TELEPHONE (OUTPATIENT)
Dept: HEMATOLOGY/ONCOLOGY | Facility: CLINIC | Age: 69
End: 2024-08-28
Payer: MEDICARE

## 2024-08-28 ENCOUNTER — DOCUMENTATION ONLY (OUTPATIENT)
Dept: HEMATOLOGY/ONCOLOGY | Facility: CLINIC | Age: 69
End: 2024-08-28
Payer: MEDICARE

## 2024-08-28 ENCOUNTER — LAB VISIT (OUTPATIENT)
Dept: LAB | Facility: HOSPITAL | Age: 69
End: 2024-08-28
Attending: INTERNAL MEDICINE
Payer: MEDICARE

## 2024-08-28 DIAGNOSIS — Z76.82 STEM CELL TRANSPLANT CANDIDATE: ICD-10-CM

## 2024-08-28 DIAGNOSIS — D46.9 MYELODYSPLASTIC SYNDROME: ICD-10-CM

## 2024-08-28 DIAGNOSIS — D61.818 PANCYTOPENIA: ICD-10-CM

## 2024-08-28 DIAGNOSIS — D61.818 PANCYTOPENIA: Primary | ICD-10-CM

## 2024-08-28 DIAGNOSIS — D64.9 SYMPTOMATIC ANEMIA: ICD-10-CM

## 2024-08-28 LAB
ABO + RH BLD: NORMAL
ALBUMIN SERPL BCP-MCNC: 3.6 G/DL (ref 3.5–5.2)
ALP SERPL-CCNC: 81 U/L (ref 55–135)
ALT SERPL W/O P-5'-P-CCNC: 24 U/L (ref 10–44)
ANION GAP SERPL CALC-SCNC: 12 MMOL/L (ref 8–16)
ANISOCYTOSIS BLD QL SMEAR: SLIGHT
AST SERPL-CCNC: 19 U/L (ref 10–40)
BASOPHILS # BLD AUTO: 0 K/UL (ref 0–0.2)
BASOPHILS NFR BLD: 0 % (ref 0–1.9)
BILIRUB SERPL-MCNC: 0.3 MG/DL (ref 0.1–1)
BLD GP AB SCN CELLS X3 SERPL QL: NORMAL
BUN SERPL-MCNC: 16 MG/DL (ref 8–23)
CALCIUM SERPL-MCNC: 9.4 MG/DL (ref 8.7–10.5)
CHLORIDE SERPL-SCNC: 105 MMOL/L (ref 95–110)
CO2 SERPL-SCNC: 23 MMOL/L (ref 23–29)
CREAT SERPL-MCNC: 0.9 MG/DL (ref 0.5–1.4)
DIFFERENTIAL METHOD BLD: ABNORMAL
DLCO ADJ PRE: 14.35 ML/(MIN*MMHG) (ref 18.77–32.62)
DLCO SINGLE BREATH LLN: 18.77
DLCO SINGLE BREATH PRE REF: 39.2 %
DLCO SINGLE BREATH REF: 25.7
DLCOC SBVA LLN: 2.6
DLCOC SBVA PRE REF: 74.4 %
DLCOC SBVA REF: 3.82
DLCOC SINGLE BREATH LLN: 18.77
DLCOC SINGLE BREATH PRE REF: 55.9 %
DLCOC SINGLE BREATH REF: 25.7
DLCOCSBVAULN: 5.05
DLCOCSINGLEBREATHULN: 32.62
DLCOCSINGLEBREATHZSCORE: -2.69
DLCOSINGLEBREATHULN: 32.62
DLCOSINGLEBREATHZSCORE: -3.71
DLCOVA LLN: 2.6
DLCOVA PRE REF: 52.3 %
DLCOVA PRE: 2 ML/(MIN*MMHG*L) (ref 2.6–5.05)
DLCOVA REF: 3.82
DLCOVAULN: 5.05
DLVAADJ PRE: 2.85 ML/(MIN*MMHG*L) (ref 2.6–5.05)
EOSINOPHIL # BLD AUTO: 0 K/UL (ref 0–0.5)
EOSINOPHIL NFR BLD: 0 % (ref 0–8)
ERYTHROCYTE [DISTWIDTH] IN BLOOD BY AUTOMATED COUNT: 12.7 % (ref 11.5–14.5)
EST. GFR  (NO RACE VARIABLE): >60 ML/MIN/1.73 M^2
FEF 25 75 LLN: 1.37
FEF 25 75 PRE REF: 63.1 %
FEF 25 75 REF: 3.08
FEV05 LLN: 1.34
FEV05 REF: 2.47
FEV1 FVC LLN: 63
FEV1 FVC PRE REF: 100.7 %
FEV1 FVC REF: 76
FEV1 LLN: 2.21
FEV1 PRE REF: 78.3 %
FEV1 REF: 3.05
FEV1FVCZSCORE: 0.07
FEV1ZSCORE: -1.31
FVC LLN: 2.98
FVC PRE REF: 77.5 %
FVC REF: 4
FVCZSCORE: -1.44
GLUCOSE SERPL-MCNC: 118 MG/DL (ref 70–110)
HCT VFR BLD AUTO: 20.6 % (ref 40–54)
HGB BLD-MCNC: 7.1 G/DL (ref 14–18)
IMM GRANULOCYTES # BLD AUTO: 0 K/UL (ref 0–0.04)
IMM GRANULOCYTES NFR BLD AUTO: 0 % (ref 0–0.5)
IVC PRE: 2.97 L (ref 2.98–5.03)
IVC SINGLE BREATH LLN: 2.98
IVC SINGLE BREATH PRE REF: 74.4 %
IVC SINGLE BREATH REF: 4
IVCSINGLEBREATHULN: 5.03
LYMPHOCYTES # BLD AUTO: 0.9 K/UL (ref 1–4.8)
LYMPHOCYTES NFR BLD: 83.7 % (ref 18–48)
MAGNESIUM SERPL-MCNC: 2 MG/DL (ref 1.6–2.6)
MCH RBC QN AUTO: 30.9 PG (ref 27–31)
MCHC RBC AUTO-ENTMCNC: 34.5 G/DL (ref 32–36)
MCV RBC AUTO: 90 FL (ref 82–98)
MONOCYTES # BLD AUTO: 0.1 K/UL (ref 0.3–1)
MONOCYTES NFR BLD: 4.8 % (ref 4–15)
NEUTROPHILS # BLD AUTO: 0.1 K/UL (ref 1.8–7.7)
NEUTROPHILS NFR BLD: 11.5 % (ref 38–73)
NRBC BLD-RTO: 0 /100 WBC
PEF LLN: 5.45
PEF PRE REF: 106.4 %
PEF REF: 7.96
PHOSPHATE SERPL-MCNC: 4.5 MG/DL (ref 2.7–4.5)
PHYSICIAN COMMENT: ABNORMAL
PLATELET # BLD AUTO: 36 K/UL (ref 150–450)
PLATELET BLD QL SMEAR: ABNORMAL
PMV BLD AUTO: 11.4 FL (ref 9.2–12.9)
POTASSIUM SERPL-SCNC: 4 MMOL/L (ref 3.5–5.1)
PRE DLCO: 10.09 ML/(MIN*MMHG) (ref 18.77–32.62)
PRE FEF 25 75: 1.95 L/S (ref 1.37–4.79)
PRE FET 100: 6.49 SEC
PRE FEV05 REF: 78.5 %
PRE FEV1 FVC: 77.01 % (ref 63.17–88.23)
PRE FEV1: 2.39 L (ref 2.21–3.83)
PRE FEV5: 1.94 L (ref 1.34–3.61)
PRE FVC: 3.1 L (ref 2.98–5.03)
PRE PEF: 8.47 L/S (ref 5.45–10.47)
PROT SERPL-MCNC: 6.8 G/DL (ref 6–8.4)
RBC # BLD AUTO: 2.3 M/UL (ref 4.6–6.2)
SODIUM SERPL-SCNC: 140 MMOL/L (ref 136–145)
SPECIMEN OUTDATE: NORMAL
VA PRE: 5.04 L (ref 6.57–6.57)
VA SINGLE BREATH LLN: 6.57
VA SINGLE BREATH PRE REF: 76.8 %
VA SINGLE BREATH REF: 6.57
VASINGLEBREATHULN: 6.57
WBC # BLD AUTO: 1.04 K/UL (ref 3.9–12.7)

## 2024-08-28 PROCEDURE — 84100 ASSAY OF PHOSPHORUS: CPT | Performed by: INTERNAL MEDICINE

## 2024-08-28 PROCEDURE — P9038 RBC IRRADIATED: HCPCS | Performed by: INTERNAL MEDICINE

## 2024-08-28 PROCEDURE — 83735 ASSAY OF MAGNESIUM: CPT | Performed by: INTERNAL MEDICINE

## 2024-08-28 PROCEDURE — 86850 RBC ANTIBODY SCREEN: CPT | Performed by: INTERNAL MEDICINE

## 2024-08-28 PROCEDURE — 27201040 HC RC 50 FILTER: Performed by: INTERNAL MEDICINE

## 2024-08-28 PROCEDURE — 80053 COMPREHEN METABOLIC PANEL: CPT | Performed by: INTERNAL MEDICINE

## 2024-08-28 PROCEDURE — 85025 COMPLETE CBC W/AUTO DIFF WBC: CPT | Performed by: INTERNAL MEDICINE

## 2024-08-28 PROCEDURE — 86920 COMPATIBILITY TEST SPIN: CPT | Performed by: INTERNAL MEDICINE

## 2024-08-28 PROCEDURE — 86644 CMV ANTIBODY: CPT | Performed by: INTERNAL MEDICINE

## 2024-08-28 PROCEDURE — 86900 BLOOD TYPING SEROLOGIC ABO: CPT | Performed by: INTERNAL MEDICINE

## 2024-08-28 RX ORDER — HYDROCODONE BITARTRATE AND ACETAMINOPHEN 500; 5 MG/1; MG/1
TABLET ORAL ONCE
Status: CANCELLED | OUTPATIENT
Start: 2024-08-28 | End: 2024-08-28

## 2024-08-28 NOTE — PROGRESS NOTES
The patient's prescription information form was completed,, and signed by Dr. Ruperto MD.  The  faxed the form to [employer] at [phone].

## 2024-08-29 ENCOUNTER — PATIENT MESSAGE (OUTPATIENT)
Dept: SURGERY | Facility: CLINIC | Age: 69
End: 2024-08-29
Payer: MEDICARE

## 2024-08-29 ENCOUNTER — INFUSION (OUTPATIENT)
Dept: INFUSION THERAPY | Facility: HOSPITAL | Age: 69
End: 2024-08-29
Attending: INTERNAL MEDICINE
Payer: MEDICARE

## 2024-08-29 VITALS
TEMPERATURE: 98 F | SYSTOLIC BLOOD PRESSURE: 120 MMHG | RESPIRATION RATE: 17 BRPM | HEART RATE: 62 BPM | DIASTOLIC BLOOD PRESSURE: 65 MMHG | OXYGEN SATURATION: 100 %

## 2024-08-29 DIAGNOSIS — Z76.82 STEM CELL TRANSPLANT CANDIDATE: Primary | ICD-10-CM

## 2024-08-29 DIAGNOSIS — D61.818 PANCYTOPENIA: ICD-10-CM

## 2024-08-29 PROCEDURE — 25000003 PHARM REV CODE 250: Performed by: INTERNAL MEDICINE

## 2024-08-29 PROCEDURE — 36430 TRANSFUSION BLD/BLD COMPNT: CPT

## 2024-08-29 RX ORDER — HYDROCODONE BITARTRATE AND ACETAMINOPHEN 500; 5 MG/1; MG/1
TABLET ORAL ONCE
Status: COMPLETED | OUTPATIENT
Start: 2024-08-29 | End: 2024-08-29

## 2024-08-29 RX ADMIN — SODIUM CHLORIDE: 9 INJECTION, SOLUTION INTRAVENOUS at 08:08

## 2024-08-30 ENCOUNTER — TELEPHONE (OUTPATIENT)
Dept: TRANSPLANT | Facility: CLINIC | Age: 69
End: 2024-08-30
Payer: MEDICARE

## 2024-08-30 DIAGNOSIS — Z76.82 STEM CELL TRANSPLANT CANDIDATE: ICD-10-CM

## 2024-08-30 DIAGNOSIS — R94.2 ABNORMAL PFT: Primary | ICD-10-CM

## 2024-08-30 NOTE — TELEPHONE ENCOUNTER
Orders received from Dr. Hatch.  Appointments for the 6 MWT and ALD consult visit have been scheduled on 9/5/24.  Portal message sent to patient to update him.   The patient's daughter replied with acceptance of the appointments scheduled on 9/5.    ----- Message from Evelyne Hatch DO sent at 8/30/2024  9:48 AM CDT -----    Regarding: RE: SCT clearance  Happy to see him.  CT has some emphysema but nothing impressive to cause a low DLCO.  Agree it's likely all anemia.  Gerri, can we schedule him with a 6MWT?  He can do a virtual visit as well if that works.  Thanks    ----- Message -----  From: Mohit Hickey MD  Sent: 8/29/2024   7:23 PM CDT  To: Fay Stephens RN; #  Subject: SCT clearance                                    Matti Stubbs,    Do you mind seeing him for SCT clearance? History of MDS. We're planning for haplo transplant (including TBI). DLCO is probably just low because of anemia, but we're hoping for your blessing. Thanks!    Paco

## 2024-08-31 DIAGNOSIS — M79.2 NEUROPATHIC PAIN: ICD-10-CM

## 2024-08-31 DIAGNOSIS — I10 PRIMARY HYPERTENSION: ICD-10-CM

## 2024-08-31 DIAGNOSIS — I25.10 CORONARY ARTERY DISEASE INVOLVING NATIVE CORONARY ARTERY OF NATIVE HEART WITHOUT ANGINA PECTORIS: ICD-10-CM

## 2024-08-31 DIAGNOSIS — D49.9 IMMUNODEFICIENCY SECONDARY TO NEOPLASM: ICD-10-CM

## 2024-08-31 DIAGNOSIS — D84.81 IMMUNODEFICIENCY SECONDARY TO NEOPLASM: ICD-10-CM

## 2024-08-31 RX ORDER — GABAPENTIN 300 MG/1
600 CAPSULE ORAL 3 TIMES DAILY
Qty: 180 CAPSULE | Refills: 11 | Status: SHIPPED | OUTPATIENT
Start: 2024-08-31

## 2024-08-31 RX ORDER — VORICONAZOLE 200 MG/1
200 TABLET, FILM COATED ORAL 2 TIMES DAILY
Qty: 60 TABLET | Refills: 11 | Status: SHIPPED | OUTPATIENT
Start: 2024-08-31 | End: 2025-08-26

## 2024-09-03 ENCOUNTER — TELEPHONE (OUTPATIENT)
Dept: HEMATOLOGY/ONCOLOGY | Facility: CLINIC | Age: 69
End: 2024-09-03
Payer: MEDICARE

## 2024-09-03 ENCOUNTER — LAB VISIT (OUTPATIENT)
Dept: LAB | Facility: HOSPITAL | Age: 69
End: 2024-09-03
Attending: INTERNAL MEDICINE
Payer: MEDICARE

## 2024-09-03 DIAGNOSIS — D64.9 SYMPTOMATIC ANEMIA: ICD-10-CM

## 2024-09-03 DIAGNOSIS — Z76.82 STEM CELL TRANSPLANT CANDIDATE: ICD-10-CM

## 2024-09-03 DIAGNOSIS — D46.9 MYELODYSPLASTIC SYNDROME: ICD-10-CM

## 2024-09-03 LAB
ABO + RH BLD: NORMAL
ALBUMIN SERPL BCP-MCNC: 3.6 G/DL (ref 3.5–5.2)
ALP SERPL-CCNC: 78 U/L (ref 55–135)
ALT SERPL W/O P-5'-P-CCNC: 25 U/L (ref 10–44)
ANION GAP SERPL CALC-SCNC: 6 MMOL/L (ref 8–16)
ANISOCYTOSIS BLD QL SMEAR: SLIGHT
AST SERPL-CCNC: 19 U/L (ref 10–40)
BASOPHILS # BLD AUTO: 0 K/UL (ref 0–0.2)
BASOPHILS NFR BLD: 0 % (ref 0–1.9)
BILIRUB SERPL-MCNC: 0.4 MG/DL (ref 0.1–1)
BLD GP AB SCN CELLS X3 SERPL QL: NORMAL
BUN SERPL-MCNC: 20 MG/DL (ref 8–23)
CALCIUM SERPL-MCNC: 9.4 MG/DL (ref 8.7–10.5)
CHLORIDE SERPL-SCNC: 105 MMOL/L (ref 95–110)
CO2 SERPL-SCNC: 28 MMOL/L (ref 23–29)
CREAT SERPL-MCNC: 0.9 MG/DL (ref 0.5–1.4)
DIFFERENTIAL METHOD BLD: ABNORMAL
EOSINOPHIL # BLD AUTO: 0 K/UL (ref 0–0.5)
EOSINOPHIL NFR BLD: 0 % (ref 0–8)
ERYTHROCYTE [DISTWIDTH] IN BLOOD BY AUTOMATED COUNT: 12.7 % (ref 11.5–14.5)
EST. GFR  (NO RACE VARIABLE): >60 ML/MIN/1.73 M^2
GLUCOSE SERPL-MCNC: 134 MG/DL (ref 70–110)
HCT VFR BLD AUTO: 23.5 % (ref 40–54)
HGB BLD-MCNC: 7.9 G/DL (ref 14–18)
HYPOCHROMIA BLD QL SMEAR: ABNORMAL
IMM GRANULOCYTES # BLD AUTO: 0 K/UL (ref 0–0.04)
IMM GRANULOCYTES NFR BLD AUTO: 0 % (ref 0–0.5)
LYMPHOCYTES # BLD AUTO: 0.8 K/UL (ref 1–4.8)
LYMPHOCYTES NFR BLD: 69.1 % (ref 18–48)
MAGNESIUM SERPL-MCNC: 2.1 MG/DL (ref 1.6–2.6)
MCH RBC QN AUTO: 30 PG (ref 27–31)
MCHC RBC AUTO-ENTMCNC: 33.6 G/DL (ref 32–36)
MCV RBC AUTO: 89 FL (ref 82–98)
MONOCYTES # BLD AUTO: 0.1 K/UL (ref 0.3–1)
MONOCYTES NFR BLD: 7.3 % (ref 4–15)
NEUTROPHILS # BLD AUTO: 0.3 K/UL (ref 1.8–7.7)
NEUTROPHILS NFR BLD: 23.6 % (ref 38–73)
NRBC BLD-RTO: 0 /100 WBC
PHOSPHATE SERPL-MCNC: 4.1 MG/DL (ref 2.7–4.5)
PLATELET # BLD AUTO: 40 K/UL (ref 150–450)
PLATELET BLD QL SMEAR: ABNORMAL
PMV BLD AUTO: 11.5 FL (ref 9.2–12.9)
POLYCHROMASIA BLD QL SMEAR: ABNORMAL
POTASSIUM SERPL-SCNC: 4.1 MMOL/L (ref 3.5–5.1)
PROT SERPL-MCNC: 6.6 G/DL (ref 6–8.4)
RBC # BLD AUTO: 2.63 M/UL (ref 4.6–6.2)
SODIUM SERPL-SCNC: 139 MMOL/L (ref 136–145)
SPECIMEN OUTDATE: NORMAL
WBC # BLD AUTO: 1.1 K/UL (ref 3.9–12.7)

## 2024-09-03 PROCEDURE — 86901 BLOOD TYPING SEROLOGIC RH(D): CPT | Performed by: INTERNAL MEDICINE

## 2024-09-03 PROCEDURE — 80053 COMPREHEN METABOLIC PANEL: CPT | Performed by: INTERNAL MEDICINE

## 2024-09-03 PROCEDURE — 36415 COLL VENOUS BLD VENIPUNCTURE: CPT | Performed by: INTERNAL MEDICINE

## 2024-09-03 PROCEDURE — 83735 ASSAY OF MAGNESIUM: CPT | Performed by: INTERNAL MEDICINE

## 2024-09-03 PROCEDURE — 86900 BLOOD TYPING SEROLOGIC ABO: CPT | Performed by: INTERNAL MEDICINE

## 2024-09-03 PROCEDURE — 85025 COMPLETE CBC W/AUTO DIFF WBC: CPT | Performed by: INTERNAL MEDICINE

## 2024-09-03 PROCEDURE — 84100 ASSAY OF PHOSPHORUS: CPT | Performed by: INTERNAL MEDICINE

## 2024-09-03 RX ORDER — CARVEDILOL 6.25 MG/1
6.25 TABLET ORAL 2 TIMES DAILY
Qty: 180 TABLET | Refills: 3 | Status: SHIPPED | OUTPATIENT
Start: 2024-09-03

## 2024-09-05 ENCOUNTER — OFFICE VISIT (OUTPATIENT)
Dept: TRANSPLANT | Facility: CLINIC | Age: 69
End: 2024-09-05
Payer: MEDICARE

## 2024-09-05 ENCOUNTER — HOSPITAL ENCOUNTER (OUTPATIENT)
Dept: PULMONOLOGY | Facility: CLINIC | Age: 69
Discharge: HOME | End: 2024-09-05
Payer: MEDICARE

## 2024-09-05 VITALS
BODY MASS INDEX: 24.72 KG/M2 | WEIGHT: 163.13 LBS | SYSTOLIC BLOOD PRESSURE: 108 MMHG | BODY MASS INDEX: 24.72 KG/M2 | OXYGEN SATURATION: 99 % | RESPIRATION RATE: 18 BRPM | WEIGHT: 163.13 LBS | HEIGHT: 68 IN | HEART RATE: 96 BPM | HEIGHT: 68 IN | TEMPERATURE: 99 F | DIASTOLIC BLOOD PRESSURE: 69 MMHG

## 2024-09-05 DIAGNOSIS — Z76.82 STEM CELL TRANSPLANT CANDIDATE: Primary | ICD-10-CM

## 2024-09-05 DIAGNOSIS — R94.2 ABNORMAL PFT: ICD-10-CM

## 2024-09-05 DIAGNOSIS — Z76.82 STEM CELL TRANSPLANT CANDIDATE: ICD-10-CM

## 2024-09-05 PROCEDURE — 99999 PR PBB SHADOW E&M-EST. PATIENT-LVL III: CPT | Mod: PBBFAC,,, | Performed by: INTERNAL MEDICINE

## 2024-09-05 NOTE — PROGRESS NOTES
[facility] INITIAL EVALUATION                                                                                                                                             Reason for Visit:  Evaluation for SCT clearance    Referring Physician: Mohit Hickey MD    History of Present Illness: Guillaume Salinas is a 69 y.o. male who is on 0L of oxygen.  He is on no assisted ventilation.  His New York Heart Association Class is I and a Karnofsky score of 100% - Normal, No Complaints, no evidence of disease. He is not diabetic.    Requires Supplemental O2: No    Massive Hemoptysis: 0 occurrences  (Enter the number of times in the last year)    Exacerbations: 0 occurrences  (Enter the number of times in the last year)    Microbiology Infections: No    Patient presents today for SCT clearance. He was diagnosed with myelodysplastic syndrome in 2023. Has undergone treatment with azacitidine/venetoclax. Patient first began with symptoms when he traveled to [facility] for an extensive dental procedure. He developed severe fatigue and weakness and was sent for evaluation. Underwent bone marrow biopsy in April and May 2023 and was diagnosed with myelodysplastic syndrome. He denies history of respiratory symptoms and never experienced dyspnea or cough with his chemotherapy. No prior chest surgeries/lung biopsies. Denies history of hospitalizations/intubations, ICU admissions related to respiratory disease. He was admitted for neutropenic fever in April, but no other complications.     Patient is a former smoker with a 12.5 pack year history. He quit 1.5 years ago. No history of familial lung disease, although his brother had lung cancer. Weight stable. Denies constitutional symptoms. No limitations in his ADLs. He previously worked in a [employer] and had exposures to detergents and cleaning products, but denies history of inhalation injury. No history of birds for pets. Overall, feels well and  anxious to move forward with transplant. He presents to the clinic today with his wife and daughter.     Past Medical History:   Diagnosis Date    Anticoagulant long-term use     Coronary artery disease     Hypertension     Myelodysplastic syndrome     Peripheral vascular disease, unspecified        Past Surgical History:   Procedure Laterality Date    BONE MARROW BIOPSY Left 4/26/2023    Procedure: Biopsy-bone marrow;  Surgeon: Harry Diamond MD;  Location: [facility] OR;  Service: Oncology;  Laterality: Left;    COLONOSCOPY N/A 01/05/2022    Procedure: COLONOSCOPY Golytely Vaccinated will bring cards;  Surgeon: Dereje Simon MD;  Location: [facility] ENDO;  Service: Endoscopy;  Laterality: N/A;  Do not cancel this order       Allergies: Patient has no known allergies.    Current Outpatient Medications   Medication Sig    acyclovir (ZOVIRAX) 400 MG tablet Take 1 tablet (400 mg total) by mouth 2 (two) times daily.    carvediloL (COREG) 6.25 MG tablet Take 1 tablet (6.25 mg total) by mouth 2 (two) times daily.    gabapentin (NEURONTIN) 300 MG capsule Take 2 capsules (600 mg total) by mouth 3 (three) times daily.    letermovir (PREVYMIS) 480 mg Tab Take 480 mg by mouth Daily.    levoFLOXacin (LEVAQUIN) 500 MG tablet Take 1 tablet (500 mg total) by mouth once daily.    LIDOcaine (XYLOCAINE) 5 % Oint ointment Apply topically 3 (three) times daily as needed (abdominal pain).    pantoprazole (PROTONIX) 40 MG tablet Take 1 tablet (40 mg total) by mouth once daily. (Patient not taking: Reported on 8/27/2024)    posaconazole (NOXAFIL) 100 mg TbEC tablet Take 3 tablets (300 mg total) by mouth once daily. (Patient not taking: Reported on 8/27/2024)    traZODone (DESYREL) 50 MG tablet Take 1 tablet (50 mg total) by mouth nightly as needed for Insomnia.    vancomycin (VANCOCIN) 125 MG capsule Take 1 capsule (125 mg total) by mouth 4 (four) times daily.    venetoclax (VENCLEXTA) 100 mg Tab Take 1 tablet (100 mg) by mouth once  daily on days 1-7 of a 28-day cycle. Do not discard any remaining tablets. (Patient not taking: Reported on 2024.)    voriconazole (VFEND) 200 MG Tab Take 1 tablet (200 mg total) by mouth 2 (two) times daily.     No current facility-administered medications for this visit.       Immunization History   Administered Date(s) Administered    COVID-19, MRNA, LN-S, PF (MODERNA FULL 0.5 ML DOSE) 2021, 2021, 10/29/2021    Influenza (FLUAD) - Quadrivalent - Adjuvanted - PF *Preferred* (65+) 10/19/2021, 2023    Pneumococcal Conjugate - 13 Valent 10/19/2021    Pneumococcal Conjugate - 20 Valent 2023    RSVpreF (Arexvy) 2023     Family History:    Family History   Problem Relation Name Age of Onset    Hypertension Mother      Cancer Father      Cancer Brother 9     No Known Problems Daughter      No Known Problems Daughter      No Known Problems Son       Social History     Substance and Sexual Activity   Alcohol Use Not Currently      Social History     Substance and Sexual Activity   Drug Use Never      Social History     Socioeconomic History    Marital status:    Tobacco Use    Smoking status: Former     Current packs/day: 0.00     Average packs/day: 0.3 packs/day for 50.0 years (12.5 ttl pk-yrs)     Types: Cigarettes     Start date: 3/1/1973     Quit date: 3/1/2023     Years since quittin.5     Passive exposure: Past    Smokeless tobacco: Never   Substance and Sexual Activity    Alcohol use: Not Currently    Drug use: Never    Sexual activity: Not Currently     Partners: Female     Social Determinants of Health     Financial Resource Strain: Low Risk  (2024)    Overall Financial Resource Strain (CARDIA)     Difficulty of Paying Living Expenses: Not hard at all   Food Insecurity: No Food Insecurity (2024)    Hunger Vital Sign     Worried About Running Out of Food in the Last Year: Never true     Ran Out of Food in the Last Year: Never true   Transportation Needs: No  Transportation Needs (8/9/2024)    PRAPARE - Transportation     Lack of Transportation (Medical): No     Lack of Transportation (Non-Medical): No   Physical Activity: Insufficiently Active (8/9/2024)    Exercise Vital Sign     Days of Exercise per Week: 2 days     Minutes of Exercise per Session: 60 min   Stress: Stress Concern Present (8/9/2024)    [facility] [place] [facility]ational [facility] - Occupational Stress Questionnaire     Feeling of Stress : To some extent   Housing Stability: Low Risk  (8/9/2024)    Housing Stability Vital Sign     Unable to Pay for Housing in the Last Year: No     Homeless in the Last Year: No     Review of Systems   Constitutional:  Negative for chills, diaphoresis, fever, malaise/fatigue and weight loss.   HENT:  Negative for congestion, ear discharge, ear pain, hearing loss, nosebleeds, sinus pain, sore throat and tinnitus.    Eyes:  Negative for blurred vision, double vision, photophobia, pain, discharge and redness.   Respiratory:  Negative for cough, hemoptysis, sputum production, shortness of breath, wheezing and stridor.    Cardiovascular:  Negative for chest pain, palpitations, orthopnea, claudication, leg swelling and PND.   Gastrointestinal:  Negative for abdominal pain, blood in stool, constipation, diarrhea, heartburn, melena, nausea and vomiting.   Genitourinary:  Negative for dysuria, flank pain, frequency, hematuria and urgency.   Musculoskeletal:  Negative for back pain, falls, joint pain, myalgias and neck pain.   Skin:  Negative for itching and rash.   Neurological:  Negative for dizziness, tingling, tremors, sensory change, speech change, focal weakness, seizures, loss of consciousness, weakness and headaches.   Endo/Heme/Allergies:  Negative for environmental allergies and polydipsia. Does not bruise/bleed easily.   Psychiatric/Behavioral:  Negative for depression, hallucinations, memory loss, substance abuse and suicidal ideas. The patient is not nervous/anxious and  does not have insomnia.      Vitals  There were no vitals taken for this visit.  Physical Exam  Vitals and nursing note reviewed.   Constitutional:       General: He is not in acute distress.     Appearance: He is normal weight. He is not ill-appearing.   HENT:      Head: Normocephalic and atraumatic.      Nose: Nose normal. No congestion or rhinorrhea.   Eyes:      General: No scleral icterus.     Extraocular Movements: Extraocular movements intact.      Conjunctiva/sclera: Conjunctivae normal.   Cardiovascular:      Rate and Rhythm: Normal rate.   Pulmonary:      Effort: Pulmonary effort is normal. No respiratory distress.      Breath sounds: No stridor. No wheezing, rhonchi or rales.   Abdominal:      General: Abdomen is flat. Bowel sounds are normal. There is no distension.      Palpations: Abdomen is soft.      Tenderness: There is no abdominal tenderness.   Musculoskeletal:      Right lower leg: No edema.      Left lower leg: No edema.   Skin:     General: Skin is warm and dry.   Neurological:      General: No focal deficit present.      Mental Status: He is oriented to person, place, and time.   Psychiatric:         Mood and Affect: Mood normal.         Behavior: Behavior normal.         Labs:  Lab Visit on 09/03/2024   Component Date Value    WBC 09/03/2024 1.10 (LL)     RBC 09/03/2024 2.63 (L)     Hemoglobin 09/03/2024 7.9 (L)     Hematocrit 09/03/2024 23.5 (L)     MCV 09/03/2024 89     MCH 09/03/2024 30.0     MCHC 09/03/2024 33.6     RDW 09/03/2024 12.7     Platelets 09/03/2024 40 (L)     MPV 09/03/2024 11.5     Immature Granulocytes 09/03/2024 0.0     Gran # (ANC) 09/03/2024 0.3 (L)     Immature Grans (Abs) 09/03/2024 0.00     Lymph # 09/03/2024 0.8 (L)     Mono # 09/03/2024 0.1 (L)     Eos # 09/03/2024 0.0     Baso # 09/03/2024 0.00     nRBC 09/03/2024 0     Gran % 09/03/2024 23.6 (L)     Lymph % 09/03/2024 69.1 (H)     Mono % 09/03/2024 7.3     Eosinophil % 09/03/2024 0.0     Basophil % 09/03/2024 0.0      Platelet Estimate 09/03/2024 Decreased (A)     Aniso 09/03/2024 Slight     Poly 09/03/2024 Occasional     Hypo 09/03/2024 Occasional     Differential Method 09/03/2024 Automated     Sodium 09/03/2024 139     Potassium 09/03/2024 4.1     Chloride 09/03/2024 105     CO2 09/03/2024 28     Glucose 09/03/2024 134 (H)     BUN 09/03/2024 20     Creatinine 09/03/2024 0.9     Calcium 09/03/2024 9.4     Total Protein 09/03/2024 6.6     Albumin 09/03/2024 3.6     Total Bilirubin 09/03/2024 0.4     Alkaline Phosphatase 09/03/2024 78     AST 09/03/2024 19     ALT 09/03/2024 25     eGFR 09/03/2024 >60     Anion Gap 09/03/2024 6 (L)     Group & Rh 09/03/2024 B POS     Indirect [place] 09/03/2024 NEG     Specimen Outdate 09/03/2024 09/06/2024 23:59     Phosphorus 09/03/2024 4.1     Magnesium 09/03/2024 2.1            9/5/2024    10:29 AM 4/30/2024     9:38 AM   Pulmonary Function Tests   FVC 3.1 liters 3.28 liters   FEV1 2.39 liters 2.5 liters   TLC (liters) 10.09 liters    DLCO (ml/mmHg sec)  10.11 ml/mmHg sec   FVC% 77.5 81   FEV1% 78.3 81   FEF 25-75 1.95 1.95   FEF 25-75% 63.1 81   TLC% 39.2    DLCO%  39         9/5/2024    10:12 AM 5/15/2024     2:23 PM   6MW   6MWT Status completed without stopping completed without stopping   Patient Reported Dyspnea Chest pain;Dyspnea;Leg pain   Was O2 used? No No   6MW Distance walked (feet) 1450 feet 1250 feet   Distance walked (meters) 441.96 meters 381 meters   Did patient stop? No No   Oxygen Saturation 98 % 97 %   Supplemental Oxygen Room Air Room Air   Heart Rate 108 bpm 99 bpm   Blood Pressure 106/70 133/77   Yisel Dyspnea Rating  nothing at all very light   Oxygen Saturation 98 % 96 %   Supplemental Oxygen Room Air Room Air   Heart Rate 128 bpm 121 bpm   Blood Pressure 120/69 130/80   Yisel Dyspnea Rating  very light somewhat heavy   Recovery Time (seconds) 110 seconds 120 seconds   Oxygen Saturation 99 % 99 %   Supplemental Oxygen Room Air Room Air   Heart Rate 103 bpm 100 bpm        Imaging:  Results for orders placed during the hospital encounter of 08/27/24    X-Ray Chest PA And Lateral    Narrative  EXAMINATION:  XR CHEST PA AND LATERAL    CLINICAL HISTORY:  .  Stem-cell transplant candidate    COMPARISON:  04/30/2024    TECHNIQUE:  PA and lateral views of the chest.    FINDINGS:  No definite acute confluent infiltrates, pneumothorax or pleural effusion.  Trachea is midline.  Cardiac silhouette is within normal limits.   Regional bones are grossly unremarkable.      Electronically signed by: Ming Worrell  Date:    08/27/2024  Time:    16:23        EXAMINATION:  CT CHEST WITHOUT CONTRAST     CLINICAL HISTORY:  clearance for stem cell transplant with history of lung nodule and emphysema; Myelodysplastic syndrome, unspecified     TECHNIQUE:  Low dose axial images, sagittal and coronal reformations were obtained from the thoracic inlet to the lung bases.  Intravenous contrast was not administered.     COMPARISON:  CT chest lung screening low-dose 11/28/2022     FINDINGS:  Base of Neck: Imaged portions of the base of the neck are grossly unremarkable.     Aorta: 2-branch vessel configuration of a left-sided aortic arch with the brachiocephalic trunk and left common carotid artery arising from a common origin.  No hyperdense crescent sign, aneurysmal degeneration, periaortic fat stranding or periaortic fluid.     Heart: Heart is normal in size.  No significant pericardial thickening. Scant coronary artery calcifications.     Pulmonary vasculature: Pulmonary arteries are not enlarged and distribute normally.  There are four pulmonary veins.     Axilla/Karina/Mediastinum: No axillary or mediastinal lymphadenopathy.  Evaluation of hilar lymph nodes is limited without IV contrast.     Airways: Trachea and mainstem bronchi are patent.     Lungs/Pleura: 0.2-cm ground-glass nodule left upper lobe (4:167), not significantly changed.  0.5-cm groundglass nodule in the right lower lobe (4:201), not  significantly changed.  Symmetric minimal paraseptal emphysematous changes with apical predominance, not significantly changed.     Esophagus: Normal in course and caliber however, evaluation is limited given the lack of oral contrast.     Soft tissues: Imaged soft tissues are grossly unremarkable.     Osseous structures: Multilevel degenerative changes of the imaged spine.  No acute displaced fracture, dislocation or suspicious lytic/blastic osseous lesion.     Upper Abdomen: Trace perihepatic ascites.  Otherwise, imaged portions of the upper abdomen are grossly unremarkable.     Impression:     1. No acute/emergent intrathoracic findings appreciated.  2. 0.2-cm ground-glass nodule left upper lobe and, 0.5-cm groundglass nodule in the right lower lobe (4:201), not significantly changed.  3. Symmetric minimal paraseptal emphysematous changes with apical predominance, not significantly changed.        Electronically signed by:Jenaro Husain  Date:                                            05/16/2024  Time:                                           09:55    Cardiodiagnostics:  Results for orders placed during the hospital encounter of 08/27/24    Echo    Interpretation Summary    Left Ventricle: The left ventricle is normal in size. Normal wall thickness. Normal wall motion. There is normal systolic function with a visually estimated ejection fraction of 60 - 65%. There is normal diastolic function. Normal left ventricular filling pressure. GLS is unreliable and cannot be reported accurately deu to poor endocardial visualization.    Right Ventricle: Normal right ventricular cavity size. Wall thickness is normal. Systolic function is normal.    Left Atrium: Normal left atrial size.    Right Atrium: Normal right atrial size.    Aortic Valve: The aortic valve is a trileaflet valve. There is mild aortic valve sclerosis. There is mild annular calcification present.    Mitral Valve: There is mild bileaflet sclerosis.     Aorta: Aortic root is normal in size measuring 3.21 cm. Ascending aorta is normal measuring 3.03 cm.    Pulmonary Artery: The estimated pulmonary artery systolic pressure is 28 mmHg.    IVC/SVC: Normal venous pressure at 3 mmHg.      Assessment:  1. Stem cell transplant candidate      Plan:     Patient referred for clearance for SCT. CT chest reviewed - no evidence of ILD. Has some mild apical emphysematous changes, but overall stable from previous. Spirometry normal. Diminished DLCO likely due to anemia. No evidence of PH on TTE. No exertional hypoxemia and has good exercise tolerance. Asymptomatic from a pulmonary standpoint.     Cleared for SCT from a pulmonary standpoint. RTC on prn basis only.       Beverley Rodriguez PA-C  [facility]

## 2024-09-06 NOTE — PROCEDURES
Guillaume Salinas is a 69 y.o.   male patient, who presents for a 6 minute walk test ordered by DO Mamie.  The diagnosis is Pre-operative Evaluation.  The patient's BMI is 24.8 kg/m2.  Predicted distance (lower limit of normal) is 369.01 meters.      Test Results:    The test was completed without stopping.  The total time walked was 360 seconds.  During walking, the patient reported:  Dyspnea; Leg pain.  The patient used no assistive devices during testing.     09/05/2024---------Distance: 441.96 meters (1450 feet)     Lap Walk Time O2 Sat % Supplemental Oxygen Heart Rate Blood Pressure Yisel Scale Comment   Pre-exercise  (Resting) 0 0 98 % Room Air 108 bpm 106/70 mmHg 0    During Exercise 1 48 sec 98 % Room Air 109 bpm      During Exercise 2 96 sec 96 % Room Air 108 bpm      During Exercise 3 144 sec 98 % Room Air 118 bpm      During Exercise 4 195 sec 98 % Room Air 121 bpm      During Exercise 5 247 sec 97 % Room Air 119 bpm      During Exercise 6 296 sec 98 % Room Air 122 bpm      During Exercise 7 344 sec 98 % Room Air 126 bpm      End of Exercise  360 sec 98 % Room Air 128 bpm 120/69 mmHg 1    Post-exercise  (Recovery)   99 % Room Air  103 bpm        Recovery Time: 110 seconds    Performing nurse/tech: Manan CHEN      PREVIOUS STUDY:   05/15/2024---------Distance: 381 meters (1250 feet)       Lap Walk Time O2 Sat % Supplemental Oxygen Heart Rate Blood Pressure Yisel Scale   Pre-exercise  (Resting) 0 0 97 % Room Air 99 bpm 133/77 mmHg 1   During Exercise 1 60 sec 94 % Room Air 111 bpm       During Exercise 2 110 sec 94 % Room Air 116 bpm       During Exercise 3 170 sec 95 % Room Air 118 bpm       During Exercise 4 240 sec 94 % Room Air 121 bpm       During Exercise 5 290 sec 96 % Room Air 121 bpm       During Exercise 6 340 sec 96 % Room Air 121 bpm       End of Exercise   360 sec 96 % Room Air 121 bpm 130/80 mmHg 4   Post-exercise  (Recovery)     99 % Room Air  100 bpm           CLINICAL  INTERPRETATION:  Six minute walk distance is 441.96 meters (1450 feet) with very light dyspnea.  During exercise, there was no significant desaturation while breathing room air.  Blood pressure remained stable and Heart rate increased significantly with walking.  Tachycardia was present prior to exercise.  The patient reported non-pulmonary symptoms during exercise.  Since the previous study in May 2024, exercise capacity is significantly improved.  Based upon age and body mass index, exercise capacity is normal.

## 2024-09-12 ENCOUNTER — DOCUMENTATION ONLY (OUTPATIENT)
Dept: HEMATOLOGY/ONCOLOGY | Facility: CLINIC | Age: 69
End: 2024-09-12

## 2024-09-12 ENCOUNTER — ANESTHESIA EVENT (OUTPATIENT)
Dept: SURGERY | Facility: HOSPITAL | Age: 69
End: 2024-09-12
Payer: MEDICARE

## 2024-09-12 ENCOUNTER — CLINICAL SUPPORT (OUTPATIENT)
Dept: HEMATOLOGY/ONCOLOGY | Facility: CLINIC | Age: 69
End: 2024-09-12
Payer: MEDICARE

## 2024-09-12 ENCOUNTER — OFFICE VISIT (OUTPATIENT)
Dept: HEMATOLOGY/ONCOLOGY | Facility: CLINIC | Age: 69
End: 2024-09-12
Payer: MEDICARE

## 2024-09-12 ENCOUNTER — LAB VISIT (OUTPATIENT)
Dept: LAB | Facility: HOSPITAL | Age: 69
End: 2024-09-12
Attending: INTERNAL MEDICINE
Payer: MEDICARE

## 2024-09-12 ENCOUNTER — TELEPHONE (OUTPATIENT)
Dept: HEMATOLOGY/ONCOLOGY | Facility: CLINIC | Age: 69
End: 2024-09-12

## 2024-09-12 VITALS
BODY MASS INDEX: 24.23 KG/M2 | SYSTOLIC BLOOD PRESSURE: 108 MMHG | DIASTOLIC BLOOD PRESSURE: 53 MMHG | HEIGHT: 69 IN | HEART RATE: 76 BPM | TEMPERATURE: 98 F | OXYGEN SATURATION: 97 % | HEIGHT: 69 IN | WEIGHT: 163.56 LBS | DIASTOLIC BLOOD PRESSURE: 53 MMHG | BODY MASS INDEX: 24.23 KG/M2 | TEMPERATURE: 98 F | SYSTOLIC BLOOD PRESSURE: 108 MMHG | OXYGEN SATURATION: 98 % | WEIGHT: 163.56 LBS | HEART RATE: 76 BPM

## 2024-09-12 DIAGNOSIS — D46.9 MYELODYSPLASTIC SYNDROME: Primary | ICD-10-CM

## 2024-09-12 DIAGNOSIS — D49.9 IMMUNODEFICIENCY SECONDARY TO NEOPLASM: ICD-10-CM

## 2024-09-12 DIAGNOSIS — D46.9 MYELODYSPLASTIC SYNDROME: ICD-10-CM

## 2024-09-12 DIAGNOSIS — D84.81 IMMUNODEFICIENCY SECONDARY TO NEOPLASM: ICD-10-CM

## 2024-09-12 DIAGNOSIS — Z75.1 ENCOUNTER FOR PERSON AWAITING ADMISSION TO ACUTE HOSPITAL: ICD-10-CM

## 2024-09-12 DIAGNOSIS — D61.818 PANCYTOPENIA: ICD-10-CM

## 2024-09-12 DIAGNOSIS — Z76.82 STEM CELL TRANSPLANT CANDIDATE: ICD-10-CM

## 2024-09-12 DIAGNOSIS — D64.9 SYMPTOMATIC ANEMIA: ICD-10-CM

## 2024-09-12 DIAGNOSIS — Z76.82 STEM CELL TRANSPLANT CANDIDATE: Primary | ICD-10-CM

## 2024-09-12 LAB
ABO + RH BLD: NORMAL
ALBUMIN SERPL BCP-MCNC: 3.7 G/DL (ref 3.5–5.2)
ALP SERPL-CCNC: 83 U/L (ref 55–135)
ALT SERPL W/O P-5'-P-CCNC: 40 U/L (ref 10–44)
ANION GAP SERPL CALC-SCNC: 6 MMOL/L (ref 8–16)
ANISOCYTOSIS BLD QL SMEAR: SLIGHT
AST SERPL-CCNC: 28 U/L (ref 10–40)
BASO STIPL BLD QL SMEAR: ABNORMAL
BASOPHILS NFR BLD: 0 % (ref 0–1.9)
BILIRUB SERPL-MCNC: 0.3 MG/DL (ref 0.1–1)
BLD GP AB SCN CELLS X3 SERPL QL: NORMAL
BUN SERPL-MCNC: 14 MG/DL (ref 8–23)
CALCIUM SERPL-MCNC: 9.3 MG/DL (ref 8.7–10.5)
CHLORIDE SERPL-SCNC: 108 MMOL/L (ref 95–110)
CO2 SERPL-SCNC: 27 MMOL/L (ref 23–29)
CREAT SERPL-MCNC: 1 MG/DL (ref 0.5–1.4)
DIFFERENTIAL METHOD BLD: ABNORMAL
EOSINOPHIL NFR BLD: 0 % (ref 0–8)
ERYTHROCYTE [DISTWIDTH] IN BLOOD BY AUTOMATED COUNT: 14.5 % (ref 11.5–14.5)
EST. GFR  (NO RACE VARIABLE): >60 ML/MIN/1.73 M^2
GLUCOSE SERPL-MCNC: 120 MG/DL (ref 70–110)
HCT VFR BLD AUTO: 21.9 % (ref 40–54)
HGB BLD-MCNC: 7.2 G/DL (ref 14–18)
HYPOCHROMIA BLD QL SMEAR: ABNORMAL
IMM GRANULOCYTES # BLD AUTO: ABNORMAL K/UL (ref 0–0.04)
IMM GRANULOCYTES NFR BLD AUTO: ABNORMAL % (ref 0–0.5)
LYMPHOCYTES NFR BLD: 64 % (ref 18–48)
MCH RBC QN AUTO: 31 PG (ref 27–31)
MCHC RBC AUTO-ENTMCNC: 32.9 G/DL (ref 32–36)
MCV RBC AUTO: 94 FL (ref 82–98)
MONOCYTES NFR BLD: 5 % (ref 4–15)
NEUTROPHILS NFR BLD: 30 % (ref 38–73)
NEUTS BAND NFR BLD MANUAL: 1 %
NRBC BLD-RTO: 0 /100 WBC
PLATELET # BLD AUTO: 54 K/UL (ref 150–450)
PLATELET BLD QL SMEAR: ABNORMAL
PMV BLD AUTO: 11.5 FL (ref 9.2–12.9)
POLYCHROMASIA BLD QL SMEAR: ABNORMAL
POTASSIUM SERPL-SCNC: 4.2 MMOL/L (ref 3.5–5.1)
PROT SERPL-MCNC: 6.8 G/DL (ref 6–8.4)
RBC # BLD AUTO: 2.32 M/UL (ref 4.6–6.2)
SARS-COV-2 RDRP RESP QL NAA+PROBE: NEGATIVE
SODIUM SERPL-SCNC: 141 MMOL/L (ref 136–145)
SPECIMEN OUTDATE: NORMAL
WBC # BLD AUTO: 1.35 K/UL (ref 3.9–12.7)

## 2024-09-12 PROCEDURE — 86850 RBC ANTIBODY SCREEN: CPT | Performed by: INTERNAL MEDICINE

## 2024-09-12 PROCEDURE — 36415 COLL VENOUS BLD VENIPUNCTURE: CPT | Performed by: INTERNAL MEDICINE

## 2024-09-12 PROCEDURE — 86901 BLOOD TYPING SEROLOGIC RH(D): CPT | Performed by: INTERNAL MEDICINE

## 2024-09-12 PROCEDURE — 85027 COMPLETE CBC AUTOMATED: CPT | Performed by: INTERNAL MEDICINE

## 2024-09-12 PROCEDURE — 99999 PR PBB SHADOW E&M-EST. PATIENT-LVL III: CPT | Mod: PBBFAC,,,

## 2024-09-12 PROCEDURE — 99999 PR PBB SHADOW E&M-EST. PATIENT-LVL IV: CPT | Mod: PBBFAC,,, | Performed by: INTERNAL MEDICINE

## 2024-09-12 PROCEDURE — 80053 COMPREHEN METABOLIC PANEL: CPT | Performed by: INTERNAL MEDICINE

## 2024-09-12 PROCEDURE — 86900 BLOOD TYPING SEROLOGIC ABO: CPT | Performed by: INTERNAL MEDICINE

## 2024-09-12 PROCEDURE — 85007 BL SMEAR W/DIFF WBC COUNT: CPT | Performed by: INTERNAL MEDICINE

## 2024-09-12 PROCEDURE — U0002 COVID-19 LAB TEST NON-CDC: HCPCS | Performed by: INTERNAL MEDICINE

## 2024-09-12 RX ORDER — DEXAMETHASONE 4 MG/1
12 TABLET ORAL
Status: CANCELLED | OUTPATIENT
Start: 2024-09-17

## 2024-09-12 RX ORDER — PROCHLORPERAZINE EDISYLATE 5 MG/ML
10 INJECTION INTRAMUSCULAR; INTRAVENOUS EVERY 6 HOURS PRN
Status: CANCELLED | OUTPATIENT
Start: 2024-09-13

## 2024-09-12 RX ORDER — ACYCLOVIR 200 MG/1
800 CAPSULE ORAL 2 TIMES DAILY
Status: CANCELLED | OUTPATIENT
Start: 2024-09-14

## 2024-09-12 RX ORDER — ONDANSETRON 8 MG/1
8 TABLET, ORALLY DISINTEGRATING ORAL
Status: CANCELLED | OUTPATIENT
Start: 2024-09-14

## 2024-09-12 RX ORDER — DIPHENHYDRAMINE HYDROCHLORIDE 50 MG/ML
25 INJECTION INTRAMUSCULAR; INTRAVENOUS EVERY 10 MIN PRN
Status: CANCELLED | OUTPATIENT
Start: 2024-09-13

## 2024-09-12 RX ORDER — MYCOPHENOLATE MOFETIL 250 MG/1
1000 CAPSULE ORAL 3 TIMES DAILY
Status: CANCELLED | OUTPATIENT
Start: 2024-09-24

## 2024-09-12 RX ORDER — ONDANSETRON HYDROCHLORIDE 2 MG/ML
8 INJECTION, SOLUTION INTRAVENOUS
Status: CANCELLED | OUTPATIENT
Start: 2024-09-22

## 2024-09-12 RX ORDER — EPINEPHRINE 0.3 MG/.3ML
0.3 INJECTION SUBCUTANEOUS DAILY PRN
Status: CANCELLED | OUTPATIENT
Start: 2024-09-13

## 2024-09-12 RX ORDER — SULFAMETHOXAZOLE AND TRIMETHOPRIM 800; 160 MG/1; MG/1
1 TABLET ORAL
Status: CANCELLED | OUTPATIENT
Start: 2024-10-19

## 2024-09-12 RX ORDER — URSODIOL 300 MG/1
300 CAPSULE ORAL 2 TIMES DAILY
Status: CANCELLED | OUTPATIENT
Start: 2024-09-14

## 2024-09-12 RX ORDER — TACROLIMUS 1 MG/1
1 CAPSULE ORAL EVERY MORNING
Status: CANCELLED | OUTPATIENT
Start: 2024-09-24

## 2024-09-12 RX ORDER — TACROLIMUS 1 MG/1
1 CAPSULE ORAL EVERY EVENING
Status: CANCELLED | OUTPATIENT
Start: 2024-09-24

## 2024-09-12 RX ORDER — SODIUM CHLORIDE 0.9 % (FLUSH) 0.9 %
10 SYRINGE (ML) INJECTION
Status: CANCELLED | OUTPATIENT
Start: 2024-09-13

## 2024-09-12 RX ORDER — LEVOFLOXACIN 500 MG/1
500 TABLET, FILM COATED ORAL DAILY
Status: CANCELLED | OUTPATIENT
Start: 2024-09-18

## 2024-09-12 RX ORDER — LORAZEPAM 1 MG/1
1 TABLET ORAL
Status: CANCELLED | OUTPATIENT
Start: 2024-09-22

## 2024-09-12 RX ORDER — POSACONAZOLE 100 MG/1
300 TABLET, DELAYED RELEASE ORAL 2 TIMES DAILY
Status: CANCELLED | OUTPATIENT
Start: 2024-09-24

## 2024-09-12 RX ORDER — METHYLPREDNISOLONE SOD SUCC 125 MG
125 VIAL (EA) INJECTION DAILY PRN
Status: CANCELLED | OUTPATIENT
Start: 2024-09-13

## 2024-09-12 RX ORDER — OLANZAPINE 5 MG/1
5 TABLET ORAL 2 TIMES DAILY
Status: CANCELLED | OUTPATIENT
Start: 2024-09-13

## 2024-09-12 RX ORDER — SODIUM CHLORIDE AND POTASSIUM CHLORIDE 150; 900 MG/100ML; MG/100ML
150 INJECTION, SOLUTION INTRAVENOUS CONTINUOUS
Status: CANCELLED | OUTPATIENT
Start: 2024-09-13

## 2024-09-12 RX ORDER — POSACONAZOLE 100 MG/1
300 TABLET, DELAYED RELEASE ORAL DAILY
Status: CANCELLED | OUTPATIENT
Start: 2024-09-26

## 2024-09-12 RX ORDER — HEPARIN 100 UNIT/ML
300 SYRINGE INTRAVENOUS
Status: CANCELLED | OUTPATIENT
Start: 2024-09-13

## 2024-09-12 NOTE — PRE-PROCEDURE INSTRUCTIONS
PreOp Instructions given:   - Verbal medication information (what to hold and what to take)   - NPO guidelines 2400  - Arrival place directions given; time to be given the day before procedure by the   Surgeon's Office DOSC   - Bathing with antibacterial soap   - Don't wear any jewelry or bring any valuables AM of surgery   - No makeup or moisturizer to face   - No perfume/cologne, powder, lotions or aftershave   Pt. verbalized understanding.   Pt denies any h/o Anesthesia/Sedation complications or side effects.  Patient does not know arrival time.  Explained that this information comes from the surgeon's office and if they haven't heard from them by 2 or 3 pm to call the office.  Patient stated an understanding.

## 2024-09-12 NOTE — PROGRESS NOTES
Consent status for submitting research data to [facility]:    Patient accepts [facility] study

## 2024-09-12 NOTE — ANESTHESIA PREPROCEDURE EVALUATION
[facility]  Anesthesia Pre-Operative Evaluation         Patient Name: Guillaume Salinas  YOB: 1955  MRN: [id number]    SUBJECTIVE:     Pre-operative evaluation for Procedure(s) (LRB):  INSERTION, CATHETER, CENTRAL VENOUS, LEMONS TRIPLE LUMEN, left poss right, Bard 12.5 fr lemons catheter, model [id number] (Left)     09/12/2024    Guillaume Salinas is a 69 y.o. male w/ a significant PMHx of CAD, emphysema, CKD 3a, HTN, myelodysplastic syndrome.    Patient now presents for the above procedure(s).    Echo August 2024:      Left Ventricle: The left ventricle is normal in size. Normal wall thickness. Normal wall motion. There is normal systolic function with a visually estimated ejection fraction of 60 - 65%. There is normal diastolic function. Normal left ventricular filling pressure. GLS is unreliable and cannot be reported accurately deu to poor endocardial visualization.    Right Ventricle: Normal right ventricular cavity size. Wall thickness is normal. Systolic function is normal.    Left Atrium: Normal left atrial size.    Right Atrium: Normal right atrial size.    Aortic Valve: The aortic valve is a trileaflet valve. There is mild aortic valve sclerosis. There is mild annular calcification present.    Mitral Valve: There is mild bileaflet sclerosis.    Aorta: Aortic root is normal in size measuring 3.21 cm. Ascending aorta is normal measuring 3.03 cm.    Pulmonary Artery: The estimated pulmonary artery systolic pressure is 28 mmHg.    IVC/SVC: Normal venous pressure at 3 mmHg.     LDA: None documented.     Prev airway: None documented.    Drips: None documented.      Patient Active Problem List   Diagnosis    Hyperlipidemia    Iliac artery stenosis, right    Coronary artery disease involving native coronary artery of native heart without angina pectoris    Personal history of nicotine dependence    Hypertension, essential    Colon polyps    Aortic atherosclerosis     Abnormal sensation of leg    Atherosclerosis of native artery of both lower extremities with intermittent claudication    Macrocytic anemia    Myelodysplastic syndrome    Thrombocytopenia    Pancytopenia    Immunodeficiency secondary to neoplasm    Chronic kidney disease, stage 3a    B12 deficiency    Decreased activity tolerance    Chemotherapy-induced neutropenia    Emphysema lung    Stem cell transplant candidate    Abnormal PFT       Review of patient's allergies indicates:  No Known Allergies    Current Inpatient Medications:      No current facility-administered medications on file prior to encounter.     Current Outpatient Medications on File Prior to Encounter   Medication Sig Dispense Refill    acyclovir (ZOVIRAX) 400 MG tablet Take 1 tablet (400 mg total) by mouth 2 (two) times daily. 60 tablet 11    letermovir (PREVYMIS) 480 mg Tab Take 480 mg by mouth Daily. (Patient not taking: Reported on 9/12/2024) 28 tablet 9    levoFLOXacin (LEVAQUIN) 500 MG tablet Take 1 tablet (500 mg total) by mouth once daily. 30 tablet 11    LIDOcaine (XYLOCAINE) 5 % Oint ointment Apply topically 3 (three) times daily as needed (abdominal pain). 50 g 2    pantoprazole (PROTONIX) 40 MG tablet Take 1 tablet (40 mg total) by mouth once daily. (Patient not taking: Reported on 8/27/2024) 30 tablet 3    posaconazole (NOXAFIL) 100 mg TbEC tablet Take 3 tablets (300 mg total) by mouth once daily. 90 tablet 11    traZODone (DESYREL) 50 MG tablet Take 1 tablet (50 mg total) by mouth nightly as needed for Insomnia. 30 tablet 11    vancomycin (VANCOCIN) 125 MG capsule Take 1 capsule (125 mg total) by mouth 4 (four) times daily. 120 capsule 0    venetoclax (VENCLEXTA) 100 mg Tab Take 1 tablet (100 mg) by mouth once daily on days 1-7 of a 28-day cycle. Do not discard any remaining tablets. (Patient not taking: Reported on 9/12/2024.) 7 tablet 11       Past Surgical History:   Procedure Laterality Date    BONE MARROW BIOPSY Left 4/26/2023     Procedure: Biopsy-bone marrow;  Surgeon: Harry Diamond MD;  Location: [facility] OR;  Service: Oncology;  Laterality: Left;    COLONOSCOPY N/A 2022    Procedure: COLONOSCOPY Golytely Vaccinated will bring cards;  Surgeon: Dereje Simon MD;  Location: [facility] ENDO;  Service: Endoscopy;  Laterality: N/A;  Do not cancel this order       Social History     Socioeconomic History    Marital status:    Tobacco Use    Smoking status: Former     Current packs/day: 0.00     Average packs/day: 0.3 packs/day for 50.0 years (12.5 ttl pk-yrs)     Types: Cigarettes     Start date: 3/1/1973     Quit date: 3/1/2023     Years since quittin.5     Passive exposure: Past    Smokeless tobacco: Never   Substance and Sexual Activity    Alcohol use: Not Currently    Drug use: Never    Sexual activity: Not Currently     Partners: Female     Social Determinants of Health     Financial Resource Strain: Low Risk  (2024)    Overall Financial Resource Strain (CARDIA)     Difficulty of Paying Living Expenses: Not hard at all   Food Insecurity: No Food Insecurity (2024)    Hunger Vital Sign     Worried About Running Out of Food in the Last Year: Never true     Ran Out of Food in the Last Year: Never true   Transportation Needs: No Transportation Needs (2024)    PRAPARE - Transportation     Lack of Transportation (Medical): No     Lack of Transportation (Non-Medical): No   Physical Activity: Insufficiently Active (2024)    Exercise Vital Sign     Days of Exercise per Week: 2 days     Minutes of Exercise per Session: 60 min   Stress: Stress Concern Present (2024)    [employer] [place] [place] [employer] - Occupational Stress Questionnaire     Feeling of Stress : To some extent   Housing Stability: Low Risk  (2024)    Housing Stability Vital Sign     Unable to Pay for Housing in the Last Year: No     Homeless in the Last Year: No       OBJECTIVE:     Vital Signs Range (Last 24H):  Temp:  [36.9 °C (98.4  °F)]   Pulse:  [76]   BP: (108)/(53)   SpO2:  [97 %-98 %]       Significant Labs:  Lab Results   Component Value Date    WBC 1.10 (LL) 09/03/2024    HGB 7.9 (L) 09/03/2024    HCT 23.5 (L) 09/03/2024    PLT 40 (L) 09/03/2024    CHOL 120 04/17/2023    TRIG 118 04/17/2023    HDL 25 (L) 04/17/2023    ALT 25 09/03/2024    AST 19 09/03/2024     09/03/2024    K 4.1 09/03/2024     09/03/2024    CREATININE 0.9 09/03/2024    BUN 20 09/03/2024    CO2 28 09/03/2024    PSA 2.6 08/27/2024    INR 1.0 08/27/2024    HGBA1C 5.2 10/19/2021       Diagnostic Studies: No relevant studies.    EKG:   Results for orders placed or performed during the hospital encounter of 08/27/24   EKG 12-lead    Collection Time: 08/27/24  3:27 PM   Result Value Ref Range    QRS Duration 76 ms    [facility] QTC Calculation 403 ms    Narrative    Test Reason : Z76.82,D46.9,    Vent. Rate : 062 BPM     Atrial Rate : 062 BPM     P-R Int : 142 ms          QRS Dur : 076 ms      QT Int : 398 ms       P-R-T Axes : 049 -23 031 degrees     QTc Int : 403 ms    Normal sinus rhythm  Normal ECG  When compared with ECG of 21-JUN-2024 16:41,  No significant change was found  Confirmed by Dereje Enriquez MD ([id number]) on 8/27/2024 3:49:55 PM    Referred By: CHRIS ROSARIO           Confirmed By:Edy Enrqiuez MD       2D ECHO:  TTE:  Results for orders placed or performed during the hospital encounter of 04/30/24   Echo   Result Value Ref Range    RA Width 3.70 cm    LA volume (mod) 30.85 cm3    Left Atrium Major Axis 3.98 cm    Left Atrium Minor Axis 4.58 cm    RA Major Axis 4.29 cm    LV Diastolic Volume 106.02 mL    LV Systolic Volume 42.34 mL    MV Peak A Dany 0.95 m/s    MV stenosis pressure 1/2 time 56.42 ms    TR Max Dany 2.22 m/s    MV Peak E Dany 0.64 m/s    Ao VTI 24.53 cm    Ao peak dany 1.36 m/s    LVOT peak VTI 20.99 cm    LVOT peak dany 1.27 m/s    LVOT diameter 1.82 cm    E wave deceleration time 194.55 msec    AV mean gradient 4 mmHg    TAPSE 2.46  cm    RVDD 2.97 cm    LA size 2.83 cm    Ascending aorta 3.37 cm    STJ 2.79 cm    Sinus 3.12 cm    LVIDs 3.24 2.1 - 4.0 cm    Posterior Wall 0.94 0.6 - 1.1 cm    IVS 0.99 0.6 - 1.1 cm    LVIDd 4.77 3.5 - 6.0 cm    TDI LATERAL 0.10 m/s    LA WIDTH 3.38 cm    TDI SEPTAL 0.08 m/s    LV LATERAL E/E' RATIO 6.40 m/s    LV SEPTAL E/E' RATIO 8.00 m/s    RV/LV Ratio 0.62 cm    FS 32 28 - 44 %    LA volume 34.63 cm3    LV mass 160.54 g    Left Ventricle Relative Wall Thickness 0.39 cm    AV valve area 2.22 cm²    AV Velocity Ratio 0.93     AV index (prosthetic) 0.86     MV valve area p 1/2 method 3.90 cm2    E/A ratio 0.67     Mean e' 0.09 m/s    LVOT area 2.6 cm2    LVOT stroke volume 54.58 cm3    AV peak gradient 7 mmHg    E/E' ratio 7.11 m/s    Triscuspid Valve Regurgitation Peak Gradient 20 mmHg    TAD by Velocity Ratio 2.43 cm²    BSA 1.92 m2    LV Systolic Volume Index 22.2 mL/m2    LV Diastolic Volume Index 55.51 mL/m2    LV Mass Index 84 g/m2    LA Volume Index 18.1 mL/m2    LA Volume Index (Mod) 16.2 mL/m2    ZLVIDS -0.17     ZLVIDD -1.19     TV resting pulmonary artery pressure 23 mmHg    RV TB RVSP 5 mmHg    Est. RA pres 3 mmHg    Narrative      Left Ventricle: The left ventricle is normal in size. Normal wall   thickness. There is normal systolic function with a visually estimated   ejection fraction of 60 - 65%. There is normal diastolic function.    Right Ventricle: Normal right ventricular cavity size. Wall thickness   is normal. Systolic function is normal.    Pulmonary Artery: The estimated pulmonary artery systolic pressure is   23 mmHg.    IVC/SVC: Normal venous pressure at 3 mmHg.         BHARAT:  No results found for this or any previous visit.    ASSESSMENT/PLAN:          Pre-op Assessment    I have reviewed the Patient Summary Reports.     I have reviewed the Nursing Notes. I have reviewed the NPO Status.   I have reviewed the Medications.     Review of Systems  Anesthesia Hx:  No problems with previous  Anesthesia   History of prior surgery of interest to airway management or planning:          Denies Family Hx of Anesthesia complications.    Denies Personal Hx of Anesthesia complications.                    Social:  Non-Smoker, No Alcohol Use       Hematology/Oncology:       -- Anemia:                  Current/Recent Cancer.                EENT/Dental:         Denies Otitis Media        Cardiovascular:     Hypertension   CAD        Denies CHF.    no hyperlipidemia                             Pulmonary:   COPD                     Renal/:  Chronic Renal Disease                Hepatic/GI:      Denies GERD.             Musculoskeletal:  Denies Arthritis.               Neurological:    Denies CVA.             Denies Dementia                         Endocrine:  Denies Diabetes.           Psych:  Denies Psychiatric History.                  Physical Exam  General: Well nourished, Cooperative, Alert and Oriented    Airway:  Mallampati: III / II  Mouth Opening: Normal  TM Distance: Normal  Tongue: Normal  Neck ROM: Normal ROM    Dental:  Intact    Chest/Lungs:  Normal Respiratory Rate    Heart:  Rate: Normal  Rhythm: Regular Rhythm        Anesthesia Plan  Type of Anesthesia, risks & benefits discussed:    Anesthesia Type: Gen Natural Airway  Intra-op Monitoring Plan: Standard ASA Monitors  Post Op Pain Control Plan: multimodal analgesia and IV/PO Opioids PRN  Informed Consent: Informed consent signed with the Patient and all parties understand the risks and agree with anesthesia plan.  All questions answered.   ASA Score: 3  Day of Surgery Review of History & Physical: H&P Update referred to the surgeon/provider.    Ready For Surgery From Anesthesia Perspective.     .

## 2024-09-12 NOTE — PROGRESS NOTES
BMT Pharmacist Treatment Plan Note:     Patient admitting for stem cell transplant.      The following patient parameters from 9/12/24 have been used in the treatment plan (within 7 days from start of chemotherapy) to calculate doses per ASBMT recommendations:     Actual body weight is being used to calculate Fludarabine and Melphalan   - Height = 176 cm   - Weight = 74.2 kg   - BSA = 1.9 m2    Ideal body weight is being used to calculate Cyclosphosphamide   - Height = 176 cm   - Weight = 74.2 kg   - Ideal body weight = 70.7 kg           Please note that adjusted body weight in Epic side bar is based off a correction factor of 40%.      Calculations:     Ideal body weight   - Males: 50 kg + 2.3 kg for each inch over 5 feet   - Females: 45.5 kg + 2.3 kg for each inch over 5 feet     Adjusted body weight   - AjBW25 (correction factor 25%) = [(Actual body weight - Ideal body weight) x 0.25] + IBW   - AjBW40 (correction factor 40%) = [(Actual body weight - Ideal body weight) x 0.40] + IBW     BSA ([place]) = SQR RT ([Height(cm) x Weight(kg)] / 3600)        Sanjuana Poe, Pharm.D., BCOP  Clinical Pharmacy Specialist, Bone Marrow Transplant/Cellular Therapy  [facility]  SpectraLink: [phone]

## 2024-09-13 ENCOUNTER — HOSPITAL ENCOUNTER (INPATIENT)
Facility: HOSPITAL | Age: 69
LOS: 33 days | Discharge: HOME HEALTH | DRG: 014 | End: 2024-10-16
Attending: INTERNAL MEDICINE | Admitting: INTERNAL MEDICINE
Payer: MEDICARE

## 2024-09-13 ENCOUNTER — HOSPITAL ENCOUNTER (OUTPATIENT)
Facility: HOSPITAL | Age: 69
Discharge: HOME | End: 2024-09-13
Attending: STUDENT IN AN ORGANIZED HEALTH CARE EDUCATION/TRAINING PROGRAM | Admitting: STUDENT IN AN ORGANIZED HEALTH CARE EDUCATION/TRAINING PROGRAM
Payer: MEDICARE

## 2024-09-13 ENCOUNTER — ANESTHESIA (OUTPATIENT)
Dept: SURGERY | Facility: HOSPITAL | Age: 69
End: 2024-09-13
Payer: MEDICARE

## 2024-09-13 VITALS
DIASTOLIC BLOOD PRESSURE: 58 MMHG | TEMPERATURE: 99 F | HEART RATE: 72 BPM | OXYGEN SATURATION: 99 % | RESPIRATION RATE: 20 BRPM | SYSTOLIC BLOOD PRESSURE: 107 MMHG

## 2024-09-13 DIAGNOSIS — R11.0 CHEMOTHERAPY-INDUCED NAUSEA: ICD-10-CM

## 2024-09-13 DIAGNOSIS — D46.9 MDS (MYELODYSPLASTIC SYNDROME): ICD-10-CM

## 2024-09-13 DIAGNOSIS — K52.1 CHEMOTHERAPY INDUCED DIARRHEA: ICD-10-CM

## 2024-09-13 DIAGNOSIS — R50.81 NEUTROPENIC FEVER: ICD-10-CM

## 2024-09-13 DIAGNOSIS — M25.511 CHRONIC PAIN OF BOTH SHOULDERS: ICD-10-CM

## 2024-09-13 DIAGNOSIS — E83.42 HYPOMAGNESEMIA: ICD-10-CM

## 2024-09-13 DIAGNOSIS — T45.1X5A CHEMOTHERAPY-INDUCED NAUSEA: ICD-10-CM

## 2024-09-13 DIAGNOSIS — M25.512 CHRONIC PAIN OF BOTH SHOULDERS: ICD-10-CM

## 2024-09-13 DIAGNOSIS — Z76.82 STEM CELL TRANSPLANT CANDIDATE: ICD-10-CM

## 2024-09-13 DIAGNOSIS — R07.9 CHEST PAIN: ICD-10-CM

## 2024-09-13 DIAGNOSIS — M79.2 NEUROPATHIC PAIN: ICD-10-CM

## 2024-09-13 DIAGNOSIS — D70.9 NEUTROPENIC FEVER: ICD-10-CM

## 2024-09-13 DIAGNOSIS — G89.29 CHRONIC PAIN OF BOTH SHOULDERS: ICD-10-CM

## 2024-09-13 DIAGNOSIS — Z94.81 S/P ALLOGENEIC BONE MARROW TRANSPLANT: ICD-10-CM

## 2024-09-13 DIAGNOSIS — D84.822 IMMUNOCOMPROMISED STATE ASSOCIATED WITH STEM CELL TRANSPLANT: ICD-10-CM

## 2024-09-13 DIAGNOSIS — Z76.82 STEM CELL TRANSPLANT CANDIDATE: Primary | ICD-10-CM

## 2024-09-13 DIAGNOSIS — T45.1X5A CHEMOTHERAPY-INDUCED NEUTROPENIA: ICD-10-CM

## 2024-09-13 DIAGNOSIS — R53.81 PHYSICAL DEBILITY: ICD-10-CM

## 2024-09-13 DIAGNOSIS — D46.9 MYELODYSPLASTIC SYNDROME: Primary | ICD-10-CM

## 2024-09-13 DIAGNOSIS — G47.00 INSOMNIA, UNSPECIFIED TYPE: ICD-10-CM

## 2024-09-13 DIAGNOSIS — J43.9 PULMONARY EMPHYSEMA, UNSPECIFIED EMPHYSEMA TYPE: ICD-10-CM

## 2024-09-13 DIAGNOSIS — Z94.84 IMMUNOCOMPROMISED STATE ASSOCIATED WITH STEM CELL TRANSPLANT: ICD-10-CM

## 2024-09-13 DIAGNOSIS — T45.1X5A CHEMOTHERAPY INDUCED DIARRHEA: ICD-10-CM

## 2024-09-13 DIAGNOSIS — D70.1 CHEMOTHERAPY-INDUCED NEUTROPENIA: ICD-10-CM

## 2024-09-13 LAB
ABO + RH BLD: NORMAL
BLD GP AB SCN CELLS X3 SERPL QL: NORMAL
BLD PROD TYP BPU: NORMAL
BLOOD UNIT EXPIRATION DATE: NORMAL
BLOOD UNIT TYPE CODE: 7300
BLOOD UNIT TYPE: NORMAL
CODING SYSTEM: NORMAL
CROSSMATCH INTERPRETATION: NORMAL
DISPENSE STATUS: NORMAL
UNIT NUMBER: NORMAL

## 2024-09-13 PROCEDURE — 86900 BLOOD TYPING SEROLOGIC ABO: CPT | Performed by: STUDENT IN AN ORGANIZED HEALTH CARE EDUCATION/TRAINING PROGRAM

## 2024-09-13 PROCEDURE — C1751 CATH, INF, PER/CENT/MIDLINE: HCPCS | Performed by: STUDENT IN AN ORGANIZED HEALTH CARE EDUCATION/TRAINING PROGRAM

## 2024-09-13 PROCEDURE — P9037 PLATE PHERES LEUKOREDU IRRAD: HCPCS | Performed by: STUDENT IN AN ORGANIZED HEALTH CARE EDUCATION/TRAINING PROGRAM

## 2024-09-13 PROCEDURE — 86901 BLOOD TYPING SEROLOGIC RH(D): CPT | Performed by: STUDENT IN AN ORGANIZED HEALTH CARE EDUCATION/TRAINING PROGRAM

## 2024-09-13 PROCEDURE — 25000003 PHARM REV CODE 250: Performed by: NURSE PRACTITIONER

## 2024-09-13 PROCEDURE — 25000003 PHARM REV CODE 250: Performed by: INTERNAL MEDICINE

## 2024-09-13 PROCEDURE — 71000015 HC POSTOP RECOV 1ST HR: Performed by: STUDENT IN AN ORGANIZED HEALTH CARE EDUCATION/TRAINING PROGRAM

## 2024-09-13 PROCEDURE — 63600175 PHARM REV CODE 636 W HCPCS: Mod: JZ,JG | Performed by: STUDENT IN AN ORGANIZED HEALTH CARE EDUCATION/TRAINING PROGRAM

## 2024-09-13 PROCEDURE — 36000706: Performed by: STUDENT IN AN ORGANIZED HEALTH CARE EDUCATION/TRAINING PROGRAM

## 2024-09-13 PROCEDURE — 71000044 HC DOSC ROUTINE RECOVERY FIRST HOUR: Performed by: STUDENT IN AN ORGANIZED HEALTH CARE EDUCATION/TRAINING PROGRAM

## 2024-09-13 PROCEDURE — 20600001 HC STEP DOWN PRIVATE ROOM

## 2024-09-13 PROCEDURE — 25000003 PHARM REV CODE 250: Performed by: NURSE ANESTHETIST, CERTIFIED REGISTERED

## 2024-09-13 PROCEDURE — 37000009 HC ANESTHESIA EA ADD 15 MINS: Performed by: STUDENT IN AN ORGANIZED HEALTH CARE EDUCATION/TRAINING PROGRAM

## 2024-09-13 PROCEDURE — 63600175 PHARM REV CODE 636 W HCPCS: Performed by: NURSE ANESTHETIST, CERTIFIED REGISTERED

## 2024-09-13 PROCEDURE — 63600175 PHARM REV CODE 636 W HCPCS: Performed by: INTERNAL MEDICINE

## 2024-09-13 PROCEDURE — 36000707: Performed by: STUDENT IN AN ORGANIZED HEALTH CARE EDUCATION/TRAINING PROGRAM

## 2024-09-13 PROCEDURE — 37000008 HC ANESTHESIA 1ST 15 MINUTES: Performed by: STUDENT IN AN ORGANIZED HEALTH CARE EDUCATION/TRAINING PROGRAM

## 2024-09-13 DEVICE — IMPLANTABLE DEVICE: Type: IMPLANTABLE DEVICE | Site: CHEST | Status: FUNCTIONAL

## 2024-09-13 RX ORDER — EPINEPHRINE 1 MG/ML
0.3 INJECTION, SOLUTION, CONCENTRATE INTRAVENOUS DAILY PRN
Status: DISCONTINUED | OUTPATIENT
Start: 2024-09-13 | End: 2024-10-16 | Stop reason: HOSPADM

## 2024-09-13 RX ORDER — URSODIOL 300 MG/1
300 CAPSULE ORAL 2 TIMES DAILY
Status: DISCONTINUED | OUTPATIENT
Start: 2024-09-14 | End: 2024-10-10

## 2024-09-13 RX ORDER — CEFAZOLIN 2 G/1
INJECTION, POWDER, FOR SOLUTION INTRAMUSCULAR; INTRAVENOUS
Status: DISCONTINUED | OUTPATIENT
Start: 2024-09-13 | End: 2024-09-13

## 2024-09-13 RX ORDER — SODIUM CHLORIDE 0.9 % (FLUSH) 0.9 %
10 SYRINGE (ML) INJECTION
Status: DISCONTINUED | OUTPATIENT
Start: 2024-09-13 | End: 2024-10-16 | Stop reason: HOSPADM

## 2024-09-13 RX ORDER — HEPARIN 100 UNIT/ML
300 SYRINGE INTRAVENOUS
Status: DISCONTINUED | OUTPATIENT
Start: 2024-09-13 | End: 2024-10-16 | Stop reason: HOSPADM

## 2024-09-13 RX ORDER — GABAPENTIN 300 MG/1
600 CAPSULE ORAL 2 TIMES DAILY
Status: DISCONTINUED | OUTPATIENT
Start: 2024-09-13 | End: 2024-10-10

## 2024-09-13 RX ORDER — DIPHENHYDRAMINE HYDROCHLORIDE 50 MG/ML
25 INJECTION INTRAMUSCULAR; INTRAVENOUS EVERY 10 MIN PRN
Status: DISCONTINUED | OUTPATIENT
Start: 2024-09-13 | End: 2024-10-16 | Stop reason: HOSPADM

## 2024-09-13 RX ORDER — LANOLIN ALCOHOL/MO/W.PET/CERES
400 CREAM (GRAM) TOPICAL EVERY 4 HOURS PRN
Status: DISCONTINUED | OUTPATIENT
Start: 2024-09-13 | End: 2024-10-08

## 2024-09-13 RX ORDER — HYDROCODONE BITARTRATE AND ACETAMINOPHEN 500; 5 MG/1; MG/1
TABLET ORAL
Status: DISCONTINUED | OUTPATIENT
Start: 2024-09-13 | End: 2024-09-13 | Stop reason: HOSPADM

## 2024-09-13 RX ORDER — PANTOPRAZOLE SODIUM 40 MG/1
40 TABLET, DELAYED RELEASE ORAL DAILY
Status: DISCONTINUED | OUTPATIENT
Start: 2024-09-14 | End: 2024-10-16 | Stop reason: HOSPADM

## 2024-09-13 RX ORDER — HALOPERIDOL 5 MG/ML
0.5 INJECTION INTRAMUSCULAR EVERY 10 MIN PRN
Status: DISCONTINUED | OUTPATIENT
Start: 2024-09-13 | End: 2024-09-13 | Stop reason: HOSPADM

## 2024-09-13 RX ORDER — PROPOFOL 10 MG/ML
VIAL (ML) INTRAVENOUS CONTINUOUS PRN
Status: DISCONTINUED | OUTPATIENT
Start: 2024-09-13 | End: 2024-09-13

## 2024-09-13 RX ORDER — BUPIVACAINE HYDROCHLORIDE 2.5 MG/ML
INJECTION, SOLUTION EPIDURAL; INFILTRATION; INTRACAUDAL
Status: DISCONTINUED | OUTPATIENT
Start: 2024-09-13 | End: 2024-09-13 | Stop reason: HOSPADM

## 2024-09-13 RX ORDER — ACETAMINOPHEN 500 MG
1000 TABLET ORAL
Status: DISCONTINUED | OUTPATIENT
Start: 2024-09-13 | End: 2024-09-13 | Stop reason: HOSPADM

## 2024-09-13 RX ORDER — SODIUM CHLORIDE 9 MG/ML
INJECTION, SOLUTION INTRAVENOUS CONTINUOUS
Status: DISCONTINUED | OUTPATIENT
Start: 2024-09-13 | End: 2024-09-13 | Stop reason: HOSPADM

## 2024-09-13 RX ORDER — GLUCAGON 1 MG
1 KIT INJECTION
Status: DISCONTINUED | OUTPATIENT
Start: 2024-09-13 | End: 2024-09-13 | Stop reason: HOSPADM

## 2024-09-13 RX ORDER — POTASSIUM CHLORIDE 20 MEQ/1
20 TABLET, EXTENDED RELEASE ORAL
Status: DISCONTINUED | OUTPATIENT
Start: 2024-09-13 | End: 2024-10-16 | Stop reason: HOSPADM

## 2024-09-13 RX ORDER — TRAZODONE HYDROCHLORIDE 50 MG/1
50 TABLET ORAL NIGHTLY PRN
Status: DISCONTINUED | OUTPATIENT
Start: 2024-09-13 | End: 2024-10-08

## 2024-09-13 RX ORDER — FENTANYL CITRATE 50 UG/ML
INJECTION, SOLUTION INTRAMUSCULAR; INTRAVENOUS
Status: DISCONTINUED | OUTPATIENT
Start: 2024-09-13 | End: 2024-09-13

## 2024-09-13 RX ORDER — LORAZEPAM 1 MG/1
1 TABLET ORAL
Status: COMPLETED | OUTPATIENT
Start: 2024-09-22 | End: 2024-09-23

## 2024-09-13 RX ORDER — DEXAMETHASONE 4 MG/1
12 TABLET ORAL
Status: COMPLETED | OUTPATIENT
Start: 2024-09-17 | End: 2024-09-17

## 2024-09-13 RX ORDER — MYCOPHENOLATE MOFETIL 250 MG/1
1000 CAPSULE ORAL 3 TIMES DAILY
Status: DISCONTINUED | OUTPATIENT
Start: 2024-09-24 | End: 2024-10-10

## 2024-09-13 RX ORDER — TACROLIMUS 1 MG/1
1 CAPSULE ORAL EVERY EVENING
Status: DISCONTINUED | OUTPATIENT
Start: 2024-09-24 | End: 2024-10-04

## 2024-09-13 RX ORDER — OLANZAPINE 2.5 MG/1
5 TABLET ORAL 2 TIMES DAILY
Status: COMPLETED | OUTPATIENT
Start: 2024-09-13 | End: 2024-09-19

## 2024-09-13 RX ORDER — PROCHLORPERAZINE EDISYLATE 5 MG/ML
10 INJECTION INTRAMUSCULAR; INTRAVENOUS EVERY 6 HOURS PRN
Status: DISCONTINUED | OUTPATIENT
Start: 2024-09-13 | End: 2024-09-30

## 2024-09-13 RX ORDER — LIDOCAINE HYDROCHLORIDE 10 MG/ML
1 INJECTION, SOLUTION EPIDURAL; INFILTRATION; INTRACAUDAL; PERINEURAL ONCE AS NEEDED
Status: DISCONTINUED | OUTPATIENT
Start: 2024-09-13 | End: 2024-09-13 | Stop reason: HOSPADM

## 2024-09-13 RX ORDER — POSACONAZOLE 100 MG/1
300 TABLET, DELAYED RELEASE ORAL DAILY
Status: DISCONTINUED | OUTPATIENT
Start: 2024-09-26 | End: 2024-10-16 | Stop reason: HOSPADM

## 2024-09-13 RX ORDER — VANCOMYCIN HYDROCHLORIDE 125 MG/1
125 CAPSULE ORAL 2 TIMES DAILY
Status: DISCONTINUED | OUTPATIENT
Start: 2024-09-13 | End: 2024-09-13

## 2024-09-13 RX ORDER — SODIUM CHLORIDE AND POTASSIUM CHLORIDE 150; 900 MG/100ML; MG/100ML
75 INJECTION, SOLUTION INTRAVENOUS CONTINUOUS
Status: DISCONTINUED | OUTPATIENT
Start: 2024-09-13 | End: 2024-09-30

## 2024-09-13 RX ORDER — LEVOFLOXACIN 500 MG/1
500 TABLET, FILM COATED ORAL DAILY
Status: DISCONTINUED | OUTPATIENT
Start: 2024-09-18 | End: 2024-09-20

## 2024-09-13 RX ORDER — ONDANSETRON HYDROCHLORIDE 2 MG/ML
8 INJECTION, SOLUTION INTRAVENOUS
Status: COMPLETED | OUTPATIENT
Start: 2024-09-22 | End: 2024-09-25

## 2024-09-13 RX ORDER — LIDOCAINE HYDROCHLORIDE 20 MG/ML
INJECTION, SOLUTION EPIDURAL; INFILTRATION; INTRACAUDAL; PERINEURAL
Status: DISCONTINUED | OUTPATIENT
Start: 2024-09-13 | End: 2024-09-13

## 2024-09-13 RX ORDER — ACYCLOVIR 200 MG/1
800 CAPSULE ORAL 2 TIMES DAILY
Status: DISCONTINUED | OUTPATIENT
Start: 2024-09-14 | End: 2024-10-10

## 2024-09-13 RX ORDER — SULFAMETHOXAZOLE AND TRIMETHOPRIM 800; 160 MG/1; MG/1
1 TABLET ORAL
Status: DISCONTINUED | OUTPATIENT
Start: 2024-10-19 | End: 2024-10-16 | Stop reason: HOSPADM

## 2024-09-13 RX ORDER — PHENYLEPHRINE HCL IN 0.9% NACL 1 MG/10 ML
SYRINGE (ML) INTRAVENOUS
Status: DISCONTINUED | OUTPATIENT
Start: 2024-09-13 | End: 2024-09-13

## 2024-09-13 RX ORDER — ONDANSETRON 8 MG/1
8 TABLET, ORALLY DISINTEGRATING ORAL
Status: COMPLETED | OUTPATIENT
Start: 2024-09-14 | End: 2024-09-19

## 2024-09-13 RX ORDER — LORAZEPAM 2 MG/ML
1 INJECTION INTRAMUSCULAR EVERY 6 HOURS PRN
Status: DISCONTINUED | OUTPATIENT
Start: 2024-09-13 | End: 2024-10-16 | Stop reason: HOSPADM

## 2024-09-13 RX ORDER — SODIUM CHLORIDE 0.9 % (FLUSH) 0.9 %
10 SYRINGE (ML) INJECTION
Status: DISCONTINUED | OUTPATIENT
Start: 2024-09-13 | End: 2024-09-13 | Stop reason: HOSPADM

## 2024-09-13 RX ORDER — HEPARIN 100 UNIT/ML
SYRINGE INTRAVENOUS
Status: DISCONTINUED | OUTPATIENT
Start: 2024-09-13 | End: 2024-09-13 | Stop reason: HOSPADM

## 2024-09-13 RX ORDER — ONDANSETRON HYDROCHLORIDE 2 MG/ML
INJECTION, SOLUTION INTRAVENOUS
Status: DISCONTINUED | OUTPATIENT
Start: 2024-09-13 | End: 2024-09-13

## 2024-09-13 RX ORDER — POSACONAZOLE 100 MG/1
300 TABLET, DELAYED RELEASE ORAL 2 TIMES DAILY
Status: COMPLETED | OUTPATIENT
Start: 2024-09-24 | End: 2024-09-25

## 2024-09-13 RX ORDER — CARVEDILOL 6.25 MG/1
6.25 TABLET ORAL 2 TIMES DAILY
Status: DISCONTINUED | OUTPATIENT
Start: 2024-09-13 | End: 2024-09-14

## 2024-09-13 RX ORDER — TACROLIMUS 1 MG/1
1 CAPSULE ORAL EVERY MORNING
Status: DISCONTINUED | OUTPATIENT
Start: 2024-09-24 | End: 2024-10-04

## 2024-09-13 RX ORDER — LANOLIN ALCOHOL/MO/W.PET/CERES
800 CREAM (GRAM) TOPICAL EVERY 4 HOURS PRN
Status: DISCONTINUED | OUTPATIENT
Start: 2024-09-13 | End: 2024-10-08

## 2024-09-13 RX ORDER — MIDAZOLAM HYDROCHLORIDE 1 MG/ML
INJECTION INTRAMUSCULAR; INTRAVENOUS
Status: DISCONTINUED | OUTPATIENT
Start: 2024-09-13 | End: 2024-09-13

## 2024-09-13 RX ORDER — METHYLPREDNISOLONE SOD SUCC 125 MG
125 VIAL (EA) INJECTION DAILY PRN
Status: DISCONTINUED | OUTPATIENT
Start: 2024-09-13 | End: 2024-10-16 | Stop reason: HOSPADM

## 2024-09-13 RX ORDER — DIPHENHYDRAMINE HCL 25 MG
50 CAPSULE ORAL
Status: DISCONTINUED | OUTPATIENT
Start: 2024-09-13 | End: 2024-10-16 | Stop reason: HOSPADM

## 2024-09-13 RX ORDER — HYDROMORPHONE HYDROCHLORIDE 1 MG/ML
0.2 INJECTION, SOLUTION INTRAMUSCULAR; INTRAVENOUS; SUBCUTANEOUS EVERY 5 MIN PRN
Status: DISCONTINUED | OUTPATIENT
Start: 2024-09-13 | End: 2024-09-13 | Stop reason: HOSPADM

## 2024-09-13 RX ADMIN — ONDANSETRON 4 MG: 2 INJECTION INTRAMUSCULAR; INTRAVENOUS at 04:09

## 2024-09-13 RX ADMIN — Medication 100 MCG: at 04:09

## 2024-09-13 RX ADMIN — GABAPENTIN 600 MG: 300 CAPSULE ORAL at 08:09

## 2024-09-13 RX ADMIN — OLANZAPINE 5 MG: 2.5 TABLET, FILM COATED ORAL at 10:09

## 2024-09-13 RX ADMIN — SODIUM CHLORIDE: 0.9 INJECTION, SOLUTION INTRAVENOUS at 03:09

## 2024-09-13 RX ADMIN — PROPOFOL 100 MCG/KG/MIN: 10 INJECTION, EMULSION INTRAVENOUS at 03:09

## 2024-09-13 RX ADMIN — MIDAZOLAM 2 MG: 1 INJECTION INTRAMUSCULAR; INTRAVENOUS at 03:09

## 2024-09-13 RX ADMIN — FENTANYL CITRATE 50 MCG: 50 INJECTION INTRAMUSCULAR; INTRAVENOUS at 03:09

## 2024-09-13 RX ADMIN — VANCOMYCIN HYDROCHLORIDE 125 MG: KIT at 08:09

## 2024-09-13 RX ADMIN — CARVEDILOL 6.25 MG: 6.25 TABLET, FILM COATED ORAL at 08:09

## 2024-09-13 RX ADMIN — SODIUM CHLORIDE AND POTASSIUM CHLORIDE 150 ML/HR: .9; .15 SOLUTION INTRAVENOUS at 10:09

## 2024-09-13 RX ADMIN — SODIUM CHLORIDE 500 ML: 0.9 INJECTION, SOLUTION INTRAVENOUS at 11:09

## 2024-09-13 RX ADMIN — TRAZODONE HYDROCHLORIDE 50 MG: 50 TABLET ORAL at 08:09

## 2024-09-13 RX ADMIN — SODIUM CHLORIDE: 0.9 INJECTION, SOLUTION INTRAVENOUS at 05:09

## 2024-09-13 RX ADMIN — CEFAZOLIN 2 G: 330 INJECTION, POWDER, FOR SOLUTION INTRAMUSCULAR; INTRAVENOUS at 03:09

## 2024-09-13 RX ADMIN — LIDOCAINE HYDROCHLORIDE 40 MG: 20 INJECTION, SOLUTION EPIDURAL; INFILTRATION; INTRACAUDAL at 03:09

## 2024-09-13 NOTE — BRIEF OP NOTE
[facility] ([facility])  Brief Operative Note    SUMMARY     Surgery Date: 9/13/2024     Surgeons and Role:     * Kg Patten MD - Primary     * Cynthia Herrera MD - Resident - Assisting     * Pal Dove MD - Resident - Assisting        Pre-op Diagnosis:  Stem cell transplant candidate [Z76.82]    Post-op Diagnosis:  Post-Op Diagnosis Codes:     * Stem cell transplant candidate [Z76.82]    Procedure(s) (LRB):  INSERTION, CATHETER, CENTRAL VENOUS, VAUGHN TRIPLE LUMEN (Right)    Anesthesia: Local MAC    Implants:  Implant Name Type Inv. Item Serial No.  Lot No. LRB No. Used Action   CATH VAUGHN 12.5FR. - [id number]  CATH VAUGHN 12.5FR.  [employer] [id number] 1 Implanted       Operative Findings: Placement of R. IJ central venous catheter (vaughn triple lumen). Without any apparent complications.    Estimated Blood Loss: * No values recorded between 9/13/2024  4:10 PM and 9/13/2024  5:00 PM *    Estimated Blood Loss has not been documented. EBL = 5cc.         Specimens:   Specimen (24h ago, onward)      None            [id number]  Pal Dove MD  General Surgery, PGY-1  [facility]

## 2024-09-13 NOTE — DISCHARGE SUMMARY
[facility] - Surgery (2nd Fl)  Discharge Note  Short Stay    Procedure(s) (LRB):  INSERTION, CATHETER, CENTRAL VENOUS, LEMONS TRIPLE LUMEN (Right)      OUTCOME: Patient tolerated treatment/procedure well without complication and is now ready for discharge.    DISPOSITION: Home or Self Care    FINAL DIAGNOSIS:  Stem cell transplant candidate    FOLLOWUP: With oncologist    DISCHARGE INSTRUCTIONS:    Discharge Procedure Orders   Diet Adult Regular     Notify your health care provider if you experience any of the following:  temperature >100.4     Notify your health care provider if you experience any of the following:  persistent nausea and vomiting or diarrhea     Notify your health care provider if you experience any of the following:  severe uncontrolled pain     Notify your health care provider if you experience any of the following:  redness, tenderness, or signs of infection (pain, swelling, redness, odor or green/yellow discharge around incision site)     Notify your health care provider if you experience any of the following:  difficulty breathing or increased cough     Notify your health care provider if you experience any of the following:  severe persistent headache     Notify your health care provider if you experience any of the following:  worsening rash     Notify your health care provider if you experience any of the following:  persistent dizziness, light-headedness, or visual disturbances     Activity as tolerated        TIME SPENT ON DISCHARGE: 5 minutes    Pal Dove MD  General Surgery, PGY-1  [facility]- Surgery

## 2024-09-13 NOTE — TRANSFER OF CARE
Anesthesia Transfer of Care Note    Patient: Guillaume Salinas    Procedure(s) Performed: Procedure(s) (LRB):  INSERTION, CATHETER, CENTRAL VENOUS, LEMONS TRIPLE LUMEN (Right)    Patient location: PACU    Anesthesia Type: general    Transport from OR: Transported from OR on 6-10 L/min O2 by face mask with adequate spontaneous ventilation    Post pain: adequate analgesia    Post assessment: no apparent anesthetic complications and tolerated procedure well    Post vital signs: stable    Level of consciousness: sedated    Nausea/Vomiting: no nausea/vomiting    Complications: none    Transfer of care protocol was followed    Last vitals: Visit Vitals  BP (!) 112/57   Pulse 68   Temp 37.1 °C (98.8 °F) (Temporal)   Resp 16   SpO2 99%

## 2024-09-13 NOTE — H&P
[facility] - Surgery ([facility])  General Surgery  History & Physical    Patient Name: Guillaume Salinas  MRN: [id number]  Admission Date: 2024  Attending Physician: Kg Patten MD   Primary Care Provider: Kal Hargrove MD    Patient information was obtained from patient and past medical records.     Subjective:     Chief Complaint/Reason for Admission: Central venous catheter placement.    History of Present Illness: Mr. Meyer is a 69 y.o M with myelodysplastic syndrome and other pmhx of Anticoagulant long-term use, Coronary artery disease , Hypertension , and Peripheral vascular disease, unspecified. Who is presenting today for placement of a central venous catheter to receive therapy.    The plan for surgery including risks and benefits were explained to the patient who is in understanding and wishes to proceed with surgery.    He denies any new changes to his medications or medical history and denies recent n/v, fever, chills, or other symptoms of systemic infection.      No current facility-administered medications on file prior to encounter.     Current Outpatient Medications on File Prior to Encounter   Medication Sig    acyclovir (ZOVIRAX) 400 MG tablet Take 1 tablet (400 mg total) by mouth 2 (two) times daily.    letermovir (PREVYMIS) 480 mg Tab Take 480 mg by mouth Daily. (Patient not taking: Reported on 2024)    levoFLOXacin (LEVAQUIN) 500 MG tablet Take 1 tablet (500 mg total) by mouth once daily.    LIDOcaine (XYLOCAINE) 5 % Oint ointment Apply topically 3 (three) times daily as needed (abdominal pain).    pantoprazole (PROTONIX) 40 MG tablet Take 1 tablet (40 mg total) by mouth once daily. (Patient not taking: Reported on 2024)    posaconazole (NOXAFIL) 100 mg TbEC tablet Take 3 tablets (300 mg total) by mouth once daily.    traZODone (DESYREL) 50 MG tablet Take 1 tablet (50 mg total) by mouth nightly as needed for Insomnia.    [] vancomycin (VANCOCIN) 125  MG capsule Take 1 capsule (125 mg total) by mouth 4 (four) times daily.    venetoclax (VENCLEXTA) 100 mg Tab Take 1 tablet (100 mg) by mouth once daily on days 1-7 of a 28-day cycle. Do not discard any remaining tablets. (Patient not taking: Reported on 2024.)       Review of patient's allergies indicates:  No Known Allergies    Past Medical History:   Diagnosis Date    Anticoagulant long-term use     Coronary artery disease     Hypertension     Myelodysplastic syndrome     Peripheral vascular disease, unspecified      Past Surgical History:   Procedure Laterality Date    BONE MARROW BIOPSY Left 2023    Procedure: Biopsy-bone marrow;  Surgeon: Harry Diamond MD;  Location: [facility] OR;  Service: Oncology;  Laterality: Left;    COLONOSCOPY N/A 2022    Procedure: COLONOSCOPY Golytely Vaccinated will bring cards;  Surgeon: Dereje Simon MD;  Location: [facility] ENDO;  Service: Endoscopy;  Laterality: N/A;  Do not cancel this order     Family History       Problem Relation (Age of Onset)    Cancer Father, Brother    Hypertension Mother    No Known Problems Daughter, Daughter, Son          Tobacco Use    Smoking status: Former     Current packs/day: 0.00     Average packs/day: 0.3 packs/day for 50.0 years (12.5 ttl pk-yrs)     Types: Cigarettes     Start date: 3/1/1973     Quit date: 3/1/2023     Years since quittin.5     Passive exposure: Past    Smokeless tobacco: Never   Substance and Sexual Activity    Alcohol use: Not Currently    Drug use: Never    Sexual activity: Not Currently     Partners: Female     Review of Systems  Objective:     Vital Signs (Most Recent):    Vital Signs (24h Range):           There is no height or weight on file to calculate BMI.     Physical Exam  Constitutional:       Appearance: Normal appearance.   HENT:      Head: Normocephalic and atraumatic.   Eyes:      Extraocular Movements: Extraocular movements intact.   Cardiovascular:      Rate and Rhythm: Normal rate.    Pulmonary:      Effort: Pulmonary effort is normal.   Abdominal:      General: Abdomen is flat.   Skin:     General: Skin is warm and dry.   Neurological:      General: No focal deficit present.      Mental Status: He is alert and oriented to person, place, and time.            I have reviewed all pertinent lab results within the past 24 hours.  CBC:   Recent Labs   Lab 09/12/24  1230   WBC 1.35*   RBC 2.32*   HGB 7.2*   HCT 21.9*   PLT 54*   MCV 94   MCH 31.0   MCHC 32.9     CMP:   Recent Labs   Lab 09/12/24  1230   *   CALCIUM 9.3   ALBUMIN 3.7   PROT 6.8      K 4.2   CO2 27      BUN 14   CREATININE 1.0   ALKPHOS 83   ALT 40   AST 28   BILITOT 0.3       Significant Diagnostics:  I have reviewed all pertinent imaging results/findings within the past 24 hours.    Assessment/Plan:     Myelodysplastic syndrome  Mr. Meyer is a 69 y.o M who is presenting today for placement of central venous catheter for therapy administration.    -Plan for OR today (9/13/2024)  -Consent obtained and in chart.      VTE Risk Mitigation (From admission, onward)           Ordered     IP VTE HIGH RISK PATIENT  Once         09/13/24 1432     Place sequential compression device  Until discontinued         09/13/24 1432                    Pal Dove MD  General Surgery  [facility] - Surgery ([facility])

## 2024-09-13 NOTE — OP NOTE
[facility]  General Surgery  Operative Note    DATE: 09/13/2024    PREOPERATIVE DIAGNOSIS: Stem cell transplant candidate    POSTOPERATIVE DIAGNOSIS: Stem cell transplant candidate    PROCEDURE PERFORMED: Right internal jugular vein triple lumen Hurd catheter placement under ultrasound and fluoroscopic guidance     ATTENDING SURGEON: Kg Patten M.D.     HOUSESTAFF SURGEON: Pal Dove M.D. (PGY-1)    : Cynthia Herrera M.D. (PGY-3)    ANESTHESIA: Monitored anesthesia care plus local.     ESTIMATED BLOOD LOSS: 10 mL     FINDINGS: A right internal jugular tunneled triple lumen Hurd catheter placed with tip at junction of superior vena cava and right atrium.     SPECIMEN: None.     DRAINS: None.     COMPLICATIONS: None.     INDICATIONS: Guillaume Salinas is a 69 y.o. male recently diagnosed with myelodysplastic syndrome. General Surgery was consulted for triple lumen, large bore tunneled catheter placement for stem cell transplant. We recommended a right vs left internal jugular vein placement and the patient agreed to proceed. The patient did sign informed consent and expressed understanding of the risks and benefits of surgery.     OPERATIVE PROCEDURE: The patient was identified in Preoperative Holding, brought back to the Operating Room, and placed supine on the operating table and padded appropriately. Monitors were applied. Monitored anesthesia care was initiated. The right neck and chest was prepped and draped in the standard sterile surgical fashion. A timeout was performed and all team members present agreed this was the correct procedure on the correct patient. We also confirmed administration of appropriate preoperative antibiotics.     After administration of appropriate local anesthesia, the right internal jugular vein was cannulated on the 3rd pass with a hollow-bore needle. A guidewire was placed and confirmed to be in correct position by  fluoroscopy. An appropriate area for the exit site was chosen, and the area was anesthetized with lidocaine. A small transverse skin incision was made. The access site was enlarged by making a small skin neck and subcutaneous tissues were dissected using a hemostat. The Hurd catheter was tunneled from the incision to the cannulation site with the tunneling device. It was then measured and cut under fluoro. Under fluoroscopic guidance, the split sheath and dilator were placed over the guidewire, and the guidewire and dilator were then removed. The catheter was placed down the split sheath and the sheath was split and peeled away. Fluoroscopy confirmed appropriate position of the catheter, which aspirated and flushed easily. Heparinized saline was instilled in the catheter. The catheter was secured to the skin using a 2-0 prolene suture. The cannulation incision was closed with a buried interrupted 4-0 Monocryl stitch. A sterile dressing was applied. The patient was transported to the Recovery Room in stable condition. All sponge, instrument and needle counts were correct at the end of the procedure. I was present and scrubbed for the entire case.     Patient tolerated the procedure well. A chest x-ray will be performed in the recovery room.

## 2024-09-13 NOTE — HPI
Mr. Meyer is a 69 y.o M with myelodysplastic syndrome and other pmhx of Anticoagulant long-term use, Coronary artery disease , Hypertension , and Peripheral vascular disease, unspecified. Who is presenting today for placement of a central venous catheter to receive therapy.    The plan for surgery including risks and benefits were explained to the patient who is in understanding and wishes to proceed with surgery.    He denies any new changes to his medications or medical history and denies recent n/v, fever, chills, or other symptoms of systemic infection.

## 2024-09-13 NOTE — PLAN OF CARE
Pt is AAOX4,VSS. Discharge instructions reviewed and pt verbalizes understanding. All questions answered. IV removed. Central line Hurd in place. Xray complete, MD reviewed okay for discharge. Pt ready for discharge.

## 2024-09-13 NOTE — SUBJECTIVE & OBJECTIVE
No current facility-administered medications on file prior to encounter.     Current Outpatient Medications on File Prior to Encounter   Medication Sig    acyclovir (ZOVIRAX) 400 MG tablet Take 1 tablet (400 mg total) by mouth 2 (two) times daily.    letermovir (PREVYMIS) 480 mg Tab Take 480 mg by mouth Daily. (Patient not taking: Reported on 2024)    levoFLOXacin (LEVAQUIN) 500 MG tablet Take 1 tablet (500 mg total) by mouth once daily.    LIDOcaine (XYLOCAINE) 5 % Oint ointment Apply topically 3 (three) times daily as needed (abdominal pain).    pantoprazole (PROTONIX) 40 MG tablet Take 1 tablet (40 mg total) by mouth once daily. (Patient not taking: Reported on 2024)    posaconazole (NOXAFIL) 100 mg TbEC tablet Take 3 tablets (300 mg total) by mouth once daily.    traZODone (DESYREL) 50 MG tablet Take 1 tablet (50 mg total) by mouth nightly as needed for Insomnia.    [] vancomycin (VANCOCIN) 125 MG capsule Take 1 capsule (125 mg total) by mouth 4 (four) times daily.    venetoclax (VENCLEXTA) 100 mg Tab Take 1 tablet (100 mg) by mouth once daily on days 1-7 of a 28-day cycle. Do not discard any remaining tablets. (Patient not taking: Reported on 2024.)       Review of patient's allergies indicates:  No Known Allergies    Past Medical History:   Diagnosis Date    Anticoagulant long-term use     Coronary artery disease     Hypertension     Myelodysplastic syndrome     Peripheral vascular disease, unspecified      Past Surgical History:   Procedure Laterality Date    BONE MARROW BIOPSY Left 2023    Procedure: Biopsy-bone marrow;  Surgeon: Harry Diamond MD;  Location: [facility] OR;  Service: Oncology;  Laterality: Left;    COLONOSCOPY N/A 2022    Procedure: COLONOSCOPY Golytely Vaccinated will bring cards;  Surgeon: Dereje Simon MD;  Location: [facility] ENDO;  Service: Endoscopy;  Laterality: N/A;  Do not cancel this order     Family History       Problem Relation (Age of Onset)     Cancer Father, Brother    Hypertension Mother    No Known Problems Daughter, Daughter, Son          Tobacco Use    Smoking status: Former     Current packs/day: 0.00     Average packs/day: 0.3 packs/day for 50.0 years (12.5 ttl pk-yrs)     Types: Cigarettes     Start date: 3/1/1973     Quit date: 3/1/2023     Years since quittin.5     Passive exposure: Past    Smokeless tobacco: Never   Substance and Sexual Activity    Alcohol use: Not Currently    Drug use: Never    Sexual activity: Not Currently     Partners: Female     Review of Systems  Objective:     Vital Signs (Most Recent):    Vital Signs (24h Range):           There is no height or weight on file to calculate BMI.     Physical Exam  Constitutional:       Appearance: Normal appearance.   HENT:      Head: Normocephalic and atraumatic.   Eyes:      Extraocular Movements: Extraocular movements intact.   Cardiovascular:      Rate and Rhythm: Normal rate.   Pulmonary:      Effort: Pulmonary effort is normal.   Abdominal:      General: Abdomen is flat.   Skin:     General: Skin is warm and dry.   Neurological:      General: No focal deficit present.      Mental Status: He is alert and oriented to person, place, and time.            I have reviewed all pertinent lab results within the past 24 hours.  CBC:   Recent Labs   Lab 24  1230   WBC 1.35*   RBC 2.32*   HGB 7.2*   HCT 21.9*   PLT 54*   MCV 94   MCH 31.0   MCHC 32.9     CMP:   Recent Labs   Lab 24  1230   *   CALCIUM 9.3   ALBUMIN 3.7   PROT 6.8      K 4.2   CO2 27      BUN 14   CREATININE 1.0   ALKPHOS 83   ALT 40   AST 28   BILITOT 0.3       Significant Diagnostics:  I have reviewed all pertinent imaging results/findings within the past 24 hours.

## 2024-09-13 NOTE — ASSESSMENT & PLAN NOTE
Mr. Meyer is a 69 y.o M who is presenting today for placement of central venous catheter for therapy administration.    -Plan for OR today (9/13/2024)  -Consent obtained and in chart.

## 2024-09-14 LAB
ABO + RH BLD: NORMAL
ALBUMIN SERPL BCP-MCNC: 2.9 G/DL (ref 3.5–5.2)
ALP SERPL-CCNC: 70 U/L (ref 55–135)
ALT SERPL W/O P-5'-P-CCNC: 22 U/L (ref 10–44)
ANION GAP SERPL CALC-SCNC: 5 MMOL/L (ref 8–16)
ANISOCYTOSIS BLD QL SMEAR: SLIGHT
AST SERPL-CCNC: 18 U/L (ref 10–40)
BASOPHILS # BLD AUTO: 0 K/UL (ref 0–0.2)
BASOPHILS NFR BLD: 0 % (ref 0–1.9)
BILIRUB SERPL-MCNC: 0.2 MG/DL (ref 0.1–1)
BLD GP AB SCN CELLS X3 SERPL QL: NORMAL
BLD PROD TYP BPU: NORMAL
BLOOD UNIT EXPIRATION DATE: NORMAL
BLOOD UNIT TYPE CODE: 7300
BLOOD UNIT TYPE: NORMAL
BUN SERPL-MCNC: 9 MG/DL (ref 8–23)
CALCIUM SERPL-MCNC: 8.3 MG/DL (ref 8.7–10.5)
CHLORIDE SERPL-SCNC: 112 MMOL/L (ref 95–110)
CO2 SERPL-SCNC: 25 MMOL/L (ref 23–29)
CODING SYSTEM: NORMAL
CREAT SERPL-MCNC: 0.7 MG/DL (ref 0.5–1.4)
CROSSMATCH INTERPRETATION: NORMAL
DACRYOCYTES BLD QL SMEAR: ABNORMAL
DIFFERENTIAL METHOD BLD: ABNORMAL
DISPENSE STATUS: NORMAL
EOSINOPHIL # BLD AUTO: 0 K/UL (ref 0–0.5)
EOSINOPHIL NFR BLD: 1.1 % (ref 0–8)
ERYTHROCYTE [DISTWIDTH] IN BLOOD BY AUTOMATED COUNT: 14.7 % (ref 11.5–14.5)
EST. GFR  (NO RACE VARIABLE): >60 ML/MIN/1.73 M^2
GLUCOSE SERPL-MCNC: 100 MG/DL (ref 70–110)
HCT VFR BLD AUTO: 17.5 % (ref 40–54)
HGB BLD-MCNC: 5.7 G/DL (ref 14–18)
HYPOCHROMIA BLD QL SMEAR: ABNORMAL
IMM GRANULOCYTES # BLD AUTO: 0 K/UL (ref 0–0.04)
IMM GRANULOCYTES NFR BLD AUTO: 0 % (ref 0–0.5)
LYMPHOCYTES # BLD AUTO: 0.5 K/UL (ref 1–4.8)
LYMPHOCYTES NFR BLD: 53.7 % (ref 18–48)
MAGNESIUM SERPL-MCNC: 2.1 MG/DL (ref 1.6–2.6)
MCH RBC QN AUTO: 31.1 PG (ref 27–31)
MCHC RBC AUTO-ENTMCNC: 32.6 G/DL (ref 32–36)
MCV RBC AUTO: 96 FL (ref 82–98)
MONOCYTES # BLD AUTO: 0.1 K/UL (ref 0.3–1)
MONOCYTES NFR BLD: 9.5 % (ref 4–15)
NEUTROPHILS # BLD AUTO: 0.3 K/UL (ref 1.8–7.7)
NEUTROPHILS NFR BLD: 35.7 % (ref 38–73)
NRBC BLD-RTO: 0 /100 WBC
NUM UNITS TRANS PACKED RBC: NORMAL
OVALOCYTES BLD QL SMEAR: ABNORMAL
PHOSPHATE SERPL-MCNC: 3.4 MG/DL (ref 2.7–4.5)
PLATELET # BLD AUTO: 57 K/UL (ref 150–450)
PLATELET BLD QL SMEAR: ABNORMAL
PMV BLD AUTO: 11.3 FL (ref 9.2–12.9)
POIKILOCYTOSIS BLD QL SMEAR: SLIGHT
POLYCHROMASIA BLD QL SMEAR: ABNORMAL
POTASSIUM SERPL-SCNC: 4 MMOL/L (ref 3.5–5.1)
PROT SERPL-MCNC: 5.2 G/DL (ref 6–8.4)
RBC # BLD AUTO: 1.83 M/UL (ref 4.6–6.2)
SODIUM SERPL-SCNC: 142 MMOL/L (ref 136–145)
SPECIMEN OUTDATE: NORMAL
WBC # BLD AUTO: 0.95 K/UL (ref 3.9–12.7)

## 2024-09-14 PROCEDURE — 30233N1 TRANSFUSION OF NONAUTOLOGOUS RED BLOOD CELLS INTO PERIPHERAL VEIN, PERCUTANEOUS APPROACH: ICD-10-PCS | Performed by: INTERNAL MEDICINE

## 2024-09-14 PROCEDURE — 85025 COMPLETE CBC W/AUTO DIFF WBC: CPT | Performed by: NURSE PRACTITIONER

## 2024-09-14 PROCEDURE — 99232 SBSQ HOSP IP/OBS MODERATE 35: CPT | Mod: GC,,, | Performed by: INTERNAL MEDICINE

## 2024-09-14 PROCEDURE — 86850 RBC ANTIBODY SCREEN: CPT | Performed by: NURSE PRACTITIONER

## 2024-09-14 PROCEDURE — 20600001 HC STEP DOWN PRIVATE ROOM

## 2024-09-14 PROCEDURE — P9040 RBC LEUKOREDUCED IRRADIATED: HCPCS

## 2024-09-14 PROCEDURE — 25000003 PHARM REV CODE 250: Performed by: NURSE PRACTITIONER

## 2024-09-14 PROCEDURE — 80053 COMPREHEN METABOLIC PANEL: CPT | Performed by: NURSE PRACTITIONER

## 2024-09-14 PROCEDURE — 63600175 PHARM REV CODE 636 W HCPCS: Mod: JZ,JG | Performed by: INTERNAL MEDICINE

## 2024-09-14 PROCEDURE — 25000003 PHARM REV CODE 250: Performed by: STUDENT IN AN ORGANIZED HEALTH CARE EDUCATION/TRAINING PROGRAM

## 2024-09-14 PROCEDURE — 86901 BLOOD TYPING SEROLOGIC RH(D): CPT | Performed by: NURSE PRACTITIONER

## 2024-09-14 PROCEDURE — 86900 BLOOD TYPING SEROLOGIC ABO: CPT | Performed by: NURSE PRACTITIONER

## 2024-09-14 PROCEDURE — 25000003 PHARM REV CODE 250: Performed by: INTERNAL MEDICINE

## 2024-09-14 PROCEDURE — A4216 STERILE WATER/SALINE, 10 ML: HCPCS | Performed by: INTERNAL MEDICINE

## 2024-09-14 PROCEDURE — 84100 ASSAY OF PHOSPHORUS: CPT | Performed by: NURSE PRACTITIONER

## 2024-09-14 PROCEDURE — 86920 COMPATIBILITY TEST SPIN: CPT

## 2024-09-14 PROCEDURE — 83735 ASSAY OF MAGNESIUM: CPT | Performed by: NURSE PRACTITIONER

## 2024-09-14 RX ORDER — HYDROCODONE BITARTRATE AND ACETAMINOPHEN 500; 5 MG/1; MG/1
TABLET ORAL
Status: DISCONTINUED | OUTPATIENT
Start: 2024-09-14 | End: 2024-09-18

## 2024-09-14 RX ORDER — SODIUM CHLORIDE 9 MG/ML
INJECTION, SOLUTION INTRAVENOUS CONTINUOUS
Status: DISCONTINUED | OUTPATIENT
Start: 2024-09-14 | End: 2024-09-14

## 2024-09-14 RX ADMIN — ACYCLOVIR 800 MG: 200 CAPSULE ORAL at 09:09

## 2024-09-14 RX ADMIN — ONDANSETRON 8 MG: 8 TABLET, ORALLY DISINTEGRATING ORAL at 08:09

## 2024-09-14 RX ADMIN — Medication 1 DOSE: at 05:09

## 2024-09-14 RX ADMIN — OLANZAPINE 5 MG: 2.5 TABLET, FILM COATED ORAL at 09:09

## 2024-09-14 RX ADMIN — Medication 1 DOSE: at 09:09

## 2024-09-14 RX ADMIN — URSODIOL 300 MG: 300 CAPSULE ORAL at 09:09

## 2024-09-14 RX ADMIN — OLANZAPINE 5 MG: 2.5 TABLET, FILM COATED ORAL at 08:09

## 2024-09-14 RX ADMIN — TRAZODONE HYDROCHLORIDE 50 MG: 50 TABLET ORAL at 08:09

## 2024-09-14 RX ADMIN — VANCOMYCIN HYDROCHLORIDE 125 MG: KIT at 09:09

## 2024-09-14 RX ADMIN — GABAPENTIN 600 MG: 300 CAPSULE ORAL at 08:09

## 2024-09-14 RX ADMIN — SODIUM CHLORIDE AND POTASSIUM CHLORIDE 150 ML/HR: .9; .15 SOLUTION INTRAVENOUS at 05:09

## 2024-09-14 RX ADMIN — PANTOPRAZOLE SODIUM 40 MG: 40 TABLET, DELAYED RELEASE ORAL at 09:09

## 2024-09-14 RX ADMIN — GABAPENTIN 600 MG: 300 CAPSULE ORAL at 09:09

## 2024-09-14 RX ADMIN — ONDANSETRON 8 MG: 8 TABLET, ORALLY DISINTEGRATING ORAL at 12:09

## 2024-09-14 RX ADMIN — FLUDARABINE PHOSPHATE 75 MG: 25 INJECTION, SOLUTION INTRAVENOUS at 10:09

## 2024-09-14 RX ADMIN — VANCOMYCIN HYDROCHLORIDE 125 MG: KIT at 08:09

## 2024-09-14 RX ADMIN — ONDANSETRON 8 MG: 8 TABLET, ORALLY DISINTEGRATING ORAL at 05:09

## 2024-09-14 RX ADMIN — Medication 1 DOSE: at 12:09

## 2024-09-14 RX ADMIN — Medication 10 ML: at 12:09

## 2024-09-14 RX ADMIN — ACYCLOVIR 800 MG: 200 CAPSULE ORAL at 08:09

## 2024-09-14 RX ADMIN — URSODIOL 300 MG: 300 CAPSULE ORAL at 08:09

## 2024-09-14 RX ADMIN — SODIUM CHLORIDE AND POTASSIUM CHLORIDE 150 ML/HR: .9; .15 SOLUTION INTRAVENOUS at 07:09

## 2024-09-14 RX ADMIN — Medication 1 DOSE: at 08:09

## 2024-09-14 RX ADMIN — SODIUM CHLORIDE AND POTASSIUM CHLORIDE 150 ML/HR: .9; .15 SOLUTION INTRAVENOUS at 01:09

## 2024-09-14 NOTE — SUBJECTIVE & OBJECTIVE
Patient information was obtained from patient, relative(s), past medical records, and ER records.     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 NGS with ASXL1 (44%), EZH2 (87%), IDH2 (42%), STAG2 (89%), U2AF1 (3%). He started treatment with azacitidine 75 mg/m2 daily x7 days plus venetoclax 100mg (voriconazole) daily x14 of 28 days. Venetoclax decreased to 7 days with cycle 3. Bone marrow biopsy 9/6/23 revealed dysplastic changes but blasts were not increased. Diploid karyotype. NGS with ASXL1 (16%). Repeat bone marrow biopsy on 4/23/24 noted persistent MDS without increased blasts. NGS with ASXL1 (25%), EZH2 (45%), IDH2 (19%), RUNX1 (19%), STAG2 (37%). Bone marrow biopsy 6/18/24 showed persistent MDS without blasts.  Now finished with Taz/Aza. Will allow time to recovery in anticipation of his allo transplant.    Medications Prior to Admission   Medication Sig Dispense Refill Last Dose    acyclovir (ZOVIRAX) 400 MG tablet Take 1 tablet (400 mg total) by mouth 2 (two) times daily. 60 tablet 11     carvediloL (COREG) 6.25 MG tablet Take 1 tablet (6.25 mg total) by mouth 2 (two) times daily. 180 tablet 3     gabapentin (NEURONTIN) 300 MG capsule Take 2 capsules (600 mg total) by mouth 3 (three) times daily. (Patient taking differently: Take 600 mg by mouth 2 (two) times daily.) 180 capsule 11     letermovir (PREVYMIS) 480 mg Tab Take 480 mg by mouth Daily. (Patient not taking: Reported on 9/12/2024) 28 tablet 9     levoFLOXacin (LEVAQUIN) 500 MG tablet Take 1 tablet (500 mg total) by mouth once daily. 30 tablet 11     LIDOcaine (XYLOCAINE) 5 % Oint ointment Apply topically 3 (three) times daily as needed (abdominal pain). 50 g 2     pantoprazole (PROTONIX) 40 MG tablet Take 1 tablet (40 mg total) by mouth once daily. (Patient not taking: Reported on 8/27/2024) 30 tablet 3     posaconazole (NOXAFIL) 100 mg TbEC tablet Take 3  tablets (300 mg total) by mouth once daily. 90 tablet 11     traZODone (DESYREL) 50 MG tablet Take 1 tablet (50 mg total) by mouth nightly as needed for Insomnia. 30 tablet 11     voriconazole (VFEND) 200 MG Tab Take 1 tablet (200 mg total) by mouth 2 (two) times daily. 60 tablet 11        Patient has no known allergies.     Past Medical History:   Diagnosis Date    Anticoagulant long-term use     Coronary artery disease     Hypertension     Myelodysplastic syndrome     Peripheral vascular disease, unspecified      Past Surgical History:   Procedure Laterality Date    BONE MARROW BIOPSY Left 2023    Procedure: Biopsy-bone marrow;  Surgeon: Harry Diamond MD;  Location: [facility] OR;  Service: Oncology;  Laterality: Left;    COLONOSCOPY N/A 2022    Procedure: COLONOSCOPY Golytely Vaccinated will bring cards;  Surgeon: Dereje Simon MD;  Location: [facility] ENDO;  Service: Endoscopy;  Laterality: N/A;  Do not cancel this order     Family History       Problem Relation (Age of Onset)    Cancer Father, Brother    Hypertension Mother    No Known Problems Daughter, Daughter, Son          Tobacco Use    Smoking status: Former     Current packs/day: 0.00     Average packs/day: 0.3 packs/day for 50.0 years (12.5 ttl pk-yrs)     Types: Cigarettes     Start date: 3/1/1973     Quit date: 3/1/2023     Years since quittin.5     Passive exposure: Past    Smokeless tobacco: Never   Substance and Sexual Activity    Alcohol use: Not Currently    Drug use: Never    Sexual activity: Not Currently     Partners: Female       Review of Systems   Constitutional:  Negative for chills, fatigue and fever.   Respiratory:  Negative for chest tightness, shortness of breath and wheezing.    Cardiovascular:  Negative for chest pain and leg swelling.   Gastrointestinal:  Negative for abdominal pain, diarrhea, nausea and vomiting.   Genitourinary:  Negative for flank pain.   Musculoskeletal:  Negative for back pain, joint swelling  and myalgias.   Neurological:  Negative for syncope, weakness and light-headedness.   Psychiatric/Behavioral:  Negative for confusion.      Objective:     Vital Signs (Most Recent):  Temp: 97.6 °F (36.4 °C) (09/13/24 1902)  Pulse: 68 (09/13/24 1902)  Resp: 19 (09/13/24 1902)  BP: 109/70 (09/13/24 1902)  SpO2: 96 % (09/13/24 1902) Vital Signs (24h Range):  Temp:  [97.6 °F (36.4 °C)-98.8 °F (37.1 °C)] 97.6 °F (36.4 °C)  Pulse:  [66-78] 68  Resp:  [16-22] 19  SpO2:  [96 %-100 %] 96 %  BP: ()/(51-70) 109/70     Weight: 74.3 kg (163 lb 12.8 oz)  Body mass index is 24.19 kg/m².  Body surface area is 1.9 meters squared.    ECOG SCORE           [unfilled]    Lines/Drains/Airways       Central Venous Catheter Line  Duration             Tunneled Central Line - Triple Lumen 09/13/24 1625 Internal Jugular Right <1 day                     Physical Exam  Constitutional:       Appearance: Normal appearance.   HENT:      Head: Normocephalic and atraumatic.   Eyes:      Extraocular Movements: Extraocular movements intact.   Pulmonary:      Effort: Pulmonary effort is normal.   Abdominal:      General: Abdomen is flat.   Neurological:      General: No focal deficit present.      Mental Status: He is alert and oriented to person, place, and time.     Physical exam limited by virtual assessment       Significant Labs:   CBC:   Recent Labs   Lab 09/12/24  1230   WBC 1.35*   HGB 7.2*   HCT 21.9*   PLT 54*    and CMP:   Recent Labs   Lab 09/12/24  1230      K 4.2      CO2 27   *   BUN 14   CREATININE 1.0   CALCIUM 9.3   PROT 6.8   ALBUMIN 3.7   BILITOT 0.3   ALKPHOS 83   AST 28   ALT 40   ANIONGAP 6*       Diagnostic Results:  I have reviewed all pertinent imaging results/findings within the past 24 hours.

## 2024-09-14 NOTE — HOSPITAL COURSE
09/14/2024 pt of Dr. Hickey with MDS admitting for FluMel 100 conditiong/haplo SCT. Feeling well today, states his CVC (which was inserted yesterday is irritating him)-appears functional, in proper place, and non infected. Transfusion 1 u pRBC  09/15/2024 Day -4 Flu/shilpi 100 conditioning for HaploSCT. No new complaints or symptoms, transfusing 1u pRBC  09/16/2024 Day -3 Flu/shilpi 100 conditioning for HaploSCT. Patient feels well, offers no new complaints.   09/17/2024: Day -2 from a  TBI PT Cy haplo (son) allogeneic BMT for MDS. Remains afebrile. VSS. Tolerating chemo well thus far. [place] planned on 9/19 at 11 am. Will receive fresh product later that day. Weight up 5 lbs from admission, but patient does not appear volume overloaded. Lungs clear. Denies SOB. Will defer diuresis today. German-speaking  utilized for assessment.  09/18/2024: Day -1 from a  TBI PT Cy haplo (son) allogeneic BMT for MDS. TBI today. Nurse reported that patient c/o chest/epigastric/upper abdominal pain on return from TBI. Stated that patient declined pain med and reported that pain was improving. Do not suspect cardiac etiology but will get EKG and troponin out of an abundance of cautions. Remains afebrile. VSS. Will get BMT tomorrow following son's harvest. Timing and number of cells TBD.  09/19/2024: Day 0 for a  TBI PT Cy haplo (son) allogeneic BMT for MDS.  Will receive bone marrow today following harvest. Timing and number of cells TBD. Continues to tolerate chemo and TBI well.   09/20/2024 Day +1 for a  TBI PT Cy haplo (son) allogeneic BMT for MDS.  Received 3.55 x 10^6 CD34/kg last night in a fresh transplant. Early morning temp spike to 102.9, infectious workup completed. Conversation today conducted entirely through ipad .  1 loose stool followed by a formed bowel movement.  09/21/2024 Day +2 for a  TBI PT Cy haplo (son) allogeneic BMT for MDS.  Tmax of 101.5 overnight. Feels like  he rested better last night. Complains of thigh cramps,that are subsiding without intervention. 1 soft stool today, no liquid stools.   09/22/2024 Day +3 for a  TBI PT Cy haplo (son) allogeneic BMT for MDS.  Tmax of 99.5 overnight. Continues to rest well overnight. 1 headache relieved with pain meds. No further loose stools. Has hesitancy while urinating, feels this is due to trying to pee while laying down, but he does not want to try flomax.  09/24/2024: Day +5 from a  TBI PT Cy haplo (son) allogeneic BMT for MDS. Afebrile over night. VSS. Last fever was > 24 hours ago. Infection w/u neg thus far. Feeling fatigued today. Having loose stools. Stool sample to r/o c-diff pending collection. Of note, has history of c-diff and is receiving ppx with oral vanc.  09/25/2024: Day +6 from a  TBI PT Cy haplo (son) allogeneic BMT for MDS. Remains afebrile. VSS. Last fever was > 48 hours ago. Infection w/u remains unremarkable. Stopping Cefepime and resuming LVQ ppx. Having c-diff neg diarrhea. Starting PRN imodium. C/o headache this morning. Daily coffee drinker. Reports that he did not drink coffee yesterday due to diarrhea. Headache improved after coffee this morning, so suspect caffeine withdrawal. Repleting phos. Encouraged to increase activity.  09/26/2024 Day +7 s/p  + TBI + PT Cy Haploidentical (son) BMT for MDS. Will have first tacro level tomorrow. Currently on 1mg BID. Afebrile, back on ppx levaquin as of yesterday. Blood cultures from 9/23 still NGTD. Denies n/v. Still with diarrhea. Scheduling imodium and adding prn lomotil. Added anusol and tucks pads for hemorrhoids. Will receive 1unit platelets today. VSS  09/27/2024 Day +8 s/p  + TBI + PT Cy Haploidentical (son) BMT for MDS. Will have first tacro level today 6.3. Continue on 1mg BID. Next level 9/30. Worsening nausea with low appetite, started on IV zofran ATC, continues on IVF. Diarrhea improved with scheduled imodium, still has  prn lomotil. Reports new worsening bilateral shoulder pain not controlled with tramadol, started on lidocaine patches and increased pain meds to oxy. Continues drinking coffee for caffeine headaches. Wife reporting frequent urination at night with limited output, will start on flomax. Started melatonin for insomnia/restlessness at night. Replacing K. Afebrile, VSS  9/28/2024 Day +9 /TBI PtCy haploidentical stem cell transplant for MDS. Nausea is minimal this morning. Reports diarrhea stable, about 9 movements in 24 hours. Remains on scheduled immodium with prn lomotil. Shoulder pain better controlled today. Reports hemorrhoids remain significant, using hydrocortisone procto cream.  9/29/2024 Day +10 /TBI PtCy haploidentical stem cell transplant for MDS. Nausea is minimal. Shoulder pain much better, controlled on current therapy. Diarrhea stable- about 9 BM daily, small volume. Hemorrhoid pain significant, but the hydrocortisone cream does provide relief. Reports now dry mouth, blood on lips from dryness/cracking. Using chapstick. Has not been consistent with oral hygiene rinses/regimen- discussed and will do better.  09/30/2024 Day +11 s/p  + TBI + PT Cy Haploidentical (son) BMT for MDS. Tacro level 9.3, remains on 1mg BID. Continues with nausea. Reports improvement in diarrhea, although worsening pain to hemorrhoids. Shoulder pain improved with lidocaine patches. Restarting home coreg for elevated BP/HR. Replacing K, mag, and phos. Afebrile, VSS  10/01/2024: Day +12 from a  TBI PT Cy haplo (son) BMT for MDS. Remains afebrile. VSS. No evidence of aGVHD. Repleting mag. Main complaints are persistent diarrhea and hemorrhoid pain and perianal excoriation that is worsened by diarrhea. Receiving scheduled imodium and PRN limotil.  Adding Questran in the hopes of bulking stools. Continuing LETs and Tucks. Barrier cream provided.  10/02/2024 Day +13 from a  TBI PT Cy haplo (son) BMT for MDS. He  didn't have any bowel movement last night. He complains of dysuria, frequency and urgency. UA ordered. He denies any abdominal pain. No fever. VSS. His left shoulder pain is better. Transfusing 1 unit of platelet to keep >10. He endorses decreased appetite.  10/03/2024: Day +14 from a  TBI PT Cy haplo (son) BMT for MDS.  Remains afebrile. VSS. Transfusing 1 unit prbc for hgb of 6.0. Oral intake, including fluids, remains poor. Continuing IVF. Bilat pedal edema noted. Encouraged patient to elevated lower extremities while in chair. Diarrhea significantly improved with Questran. One small BM over night. Changing loperamide to PRN. Patient aware that he will need to ask for medication. No aGVHD. Wife and daughter at bedside at time of exam. Niuean-speaking  utilized. Spend good deal of time discussing treatment plan with patient and family via Niuean-speaking .  10/04/2024: Day +15 from a  TBI PT Cy haplo (son) BMT for MDS.  Remains afebrile. VSS. Transfusing plts for plt count of 9K. Tacro level is 14.2. Holding morning dose of tacro and will resume this evening at a 50% dose reduction (0.5 mg BID). Diarrhea well-controlled with scheduled Questran and PRN imodium. Weight up from admission but down 2 lbs in last 24 hours without diuresis. Will defer diuresis again today. No aGVHD.  10/05/2024 Day +16 from a  TBI PT Cy haplo (son) BMT for MDS.  Remains afebrile. VSS. Full conversation had through online [place]. He is eating very little. Less than a 10th of a bannana. He feels he would eat more if nausea is better controlled. Diarrhea well-controlled with scheduled Questran and PRN imodium. Weight up from admission but down an additional 2 lbs in last 24 hours without diuresis. Will defer diuresis again today. No aGVHD.  10/06/2024  Day +17 from a  TBI PT Cy haplo (son) BMT for MDS.  Remains afebrile. VSS. Sitting up in recliner today with daughter and wife at  bedside. Increased po intake in the last day. Family would like a walker for better stability.  10/07/2024 Day +18 s/p  + TBI + PT Cy Haploidentical (son) BMT for MDS. Tacro level stable at 8.8. Continues on 0.5mg BID.  today. Still with diarrhea although much improved. Continues with bilateral shoulder pain. Up 5lbs in last 24hrs so IVF rate reduced. Replacing mag. Afebrile, VSS  10/08/2024 Day +19 s/p  + TBI + PT Cy Haploidentical (son) BMT for MDS. Continuing tacro at 0.5mg BID.  today. Still with nausea, remains on IV zofran and compazine. Will add zyprexa at night. Still with diarrhea although much improved. Continues with bilateral shoulder pain. Weight down with reduced continuous IVF rate. Replacing mag, increased scheduled dose to 800mg BID. PT/OT recommending HH. Will receive 1unit platelets. Afebrile, VSS  10/09/2024 Day +20 s/p  + TBI + PT Cy Haploidentical (son) BMT for MDS. Day 1 of engraftment today with an ANC of 531. Tacro level 6.4. Increasing dose to 0.5mg AM and 1mg PM. Nausea better with zyprexa. Diarrhea controlled with prn imodium. Reports feels like some large pills get stuck when swallowing; encouraged patient to take slowly with plenty of water. Will stop IVF today. PT/OT recommending HH. Afebrile, VSS  10/10/2024 Day +21 s/p  + TBI + PT Cy Haploidentical (son) BMT for MDS. Day 2 of engraftment today with an ANC of 1147. Still having difficulty taking large pills, meds changed to liquid as able. If persists, will consider speech eval tomorrow. Patient still with very limited PO intake. Nausea controlled with zyprexa. IV compazine and zofran changed to PO. Still with intermittent SOB although O2 sats WNL. Encouraged ambulation and use of incentive spirometer. Replacing mag. Afebrile, VSS  10/11/2024 - Day +22 s/p  + TBI + PT Cy Haploidentical (son) BMT for MDS. Day 3 of engraftment today with an ANC of 1162. He is Afebrile, VSS. Denies any nausea and  vomiting. Complains of diarrhea abt 4x this morning. He is off oxygen. CXR shows some pulmonary edema. Lasix 20mg ordered. Continue to encourage ambulation. Receiving 1 unit of platelets.  10/12/2024 D+23 s/p  + TBI + PT Cy Haploidentical (son) BMT for MDS. Engrafted D+20. ANC 1742 today, stop G-CSF tomorrow if sustained improvement. Continues to have diarrhea, cholestyramine helping somewhat. Encourage imodium prn usage. If no improvement, plan to check C.diff. Tacro 8.3.   10/13/2024 D+24 s/p  + TBI + PT Cy Haploidentical (son) BMT for MDS. Engrafted D+20.  ANC 2930 today, stop G-CSF. Diarrhea persists and worsening. Checking C diff and stool studies. If negative, plan to schedule imodium.   10/14/2024 -  D+25 s/p  + TBI + PT Cy Haploidentical (son) BMT for MDS. Afebrile. VSS. Engrafted D+20. ANC 3775 today. Continues to have loose watery stool and has been started on imodium as C diff is negative. Stool studies pending. Platelet 10 today. 1 unit of platelets ordered. Continue supportive care.   10/15/2024 - D+26 s/p  + TBI + PT Cy Haploidentical (son) BMT for MDS. Afebrile. VSS. Engrafted D+20. ANC 1849 today. Continue to have loose watery stool. About 8 episodes yesterday. Loperamide and questran dose increased. GI panel negative. Stool studies pending. Appetite is sub optimal.  10/16/2024 - D+27 s/p  + TBI + PT Cy Haploidentical (son) BMT for MDS. Afebrile. VSS. Engrafted D+20. Diarrhea resolved. His bilateral shoulder pain is better. No new complaints. Hgb 6.6, transfusing one unit of prbc. His ANC is trending down after discontinuing GCSF. It has dropped over 50% and will need GCSF at outpatient. He will be discharge with home health PT.

## 2024-09-14 NOTE — SUBJECTIVE & OBJECTIVE
Subjective:     Interval History: pt of Dr. Hickey with MDS admitting for FluMel 100 conditiong/haplo SCT. Feeling well today, states his CVC (which was inserted yesterday is irritating him)-appears functional, in proper place, and non infected.    Objective:     Vital Signs (Most Recent):  Temp: 98.1 °F (36.7 °C) (09/14/24 0800)  Pulse: 63 (09/14/24 0800)  Resp: 18 (09/14/24 0800)  BP: (!) 101/52 (09/14/24 0800)  SpO2: 98 % (09/14/24 0800) Vital Signs (24h Range):  Temp:  [97.6 °F (36.4 °C)-98.8 °F (37.1 °C)] 98.1 °F (36.7 °C)  Pulse:  [63-78] 63  Resp:  [16-22] 18  SpO2:  [94 %-100 %] 98 %  BP: ()/(48-70) 101/52     Weight: 74.2 kg (163 lb 9.3 oz)  Body mass index is 24.16 kg/m².  Body surface area is 1.9 meters squared.    ECOG SCORE           [unfilled]    Intake/Output - Last 3 Shifts         09/12 0700 09/13 0659 09/13 0700 09/14 0659 09/14 0700  09/15 0659    P.O.  460 110    Total Intake(mL/kg)  460 (6.2) 110 (1.5)    Urine (mL/kg/hr)  400 400 (1.6)    Stool  0 0    Total Output  400 400    Net  +60 -290           Urine Occurrence  2 x 1 x    Stool Occurrence  0 x 0 x             Physical Exam  Constitutional:       General: He is not in acute distress.     Appearance: Normal appearance. He is not ill-appearing.   HENT:      Head: Normocephalic and atraumatic.      Nose: No congestion or rhinorrhea.      Mouth/Throat:      Mouth: Mucous membranes are dry.   Eyes:      Extraocular Movements: Extraocular movements intact.   Cardiovascular:      Rate and Rhythm: Normal rate and regular rhythm.   Pulmonary:      Effort: Pulmonary effort is normal.   Abdominal:      General: Abdomen is flat. There is no distension.      Tenderness: There is no abdominal tenderness.   Musculoskeletal:      Cervical back: Normal range of motion. No rigidity.      Right lower leg: No edema.      Left lower leg: No edema.   Neurological:      General: No focal deficit present.      Mental Status: He is alert and oriented to  person, place, and time.            Significant Labs:   All pertinent labs from the last 24 hours have been reviewed.    Diagnostic Results:  I have reviewed all pertinent imaging results/findings within the past 24 hours.

## 2024-09-14 NOTE — PLAN OF CARE
Nurses Note -- 4 Eyes      9/14/2024   1:04 AM      Skin assessed during: Admit      [] No Altered Skin Integrity Present    []Prevention Measures Documented      [x] Yes- Altered Skin Integrity Present or Discovered   [] LDA Added if Not in Epic (Describe Wound)   [x] New Altered Skin Integrity was Present on Admit and Documented in LDA   [] Wound Image Taken    Wound Care Consulted? No    Attending Nurse:  Benton Pavon RN/Staff Member:   gracy        Problem: Adult Inpatient Plan of Care  Goal: Plan of Care Review  Outcome: Progressing  Goal: Patient-Specific Goal (Individualized)  Outcome: Progressing  Goal: Absence of Hospital-Acquired Illness or Injury  Outcome: Progressing  Goal: Optimal Comfort and Wellbeing  Outcome: Progressing  Goal: Readiness for Transition of Care  Outcome: Progressing     Problem: Infection  Goal: Absence of Infection Signs and Symptoms  Outcome: Progressing     Problem: Wound  Goal: Absence of Infection Signs and Symptoms  Outcome: Progressing  Goal: Optimal Pain Control and Function  Outcome: Progressing  Goal: Skin Health and Integrity  Outcome: Progressing  Goal: Optimal Wound Healing  Outcome: Progressing     Problem: Stem Cell/Bone Marrow Transplant  Goal: Optimal Coping with Transplant  Outcome: Progressing  Goal: Symptom-Free Urinary Elimination  Outcome: Progressing  Goal: Diarrhea Symptom Control  Outcome: Progressing  Goal: Improved Activity Tolerance  Outcome: Progressing  Goal: Blood Counts Within Acceptable Range  Outcome: Progressing  Goal: Absence of Hypersensitivity Reaction  Outcome: Progressing  Goal: Absence of Infection  Outcome: Progressing  Goal: Improved Oral Mucous Membrane Health and Integrity  Outcome: Progressing  Goal: Nausea and Vomiting Symptom Relief  Outcome: Progressing  Goal: Optimal Nutrition Intake  Outcome: Progressing

## 2024-09-14 NOTE — HPI
68 y/o PMH MDS, PVD, CAD, HTN admitted for haploSCT for tx of MDS. Reports mild soreness at CVC placement site, otherwise no complaints reported.

## 2024-09-14 NOTE — PROGRESS NOTES
[facility] - Oncology ([facility])  Hematology  Bone Marrow Transplant  Progress Note    Patient Name: Guillaume Salinas  Admission Date: 9/13/2024  Hospital Length of Stay: 1 days  Code Status: Full Code    Subjective:     Interval History: pt of Dr. Hickey with MDS admitting for FluMel 100 conditiong/haplo SCT. Feeling well today, states his CVC (which was inserted yesterday is irritating him)-appears functional, in proper place, and non infected. Transfusion 1 u pRBC    Objective:     Vital Signs (Most Recent):  Temp: 98.1 °F (36.7 °C) (09/14/24 0800)  Pulse: 63 (09/14/24 0800)  Resp: 18 (09/14/24 0800)  BP: (!) 101/52 (09/14/24 0800)  SpO2: 98 % (09/14/24 0800) Vital Signs (24h Range):  Temp:  [97.6 °F (36.4 °C)-98.8 °F (37.1 °C)] 98.1 °F (36.7 °C)  Pulse:  [63-78] 63  Resp:  [16-22] 18  SpO2:  [94 %-100 %] 98 %  BP: ()/(48-70) 101/52     Weight: 74.2 kg (163 lb 9.3 oz)  Body mass index is 24.16 kg/m².  Body surface area is 1.9 meters squared.    ECOG SCORE           [unfilled]    Intake/Output - Last 3 Shifts         09/12 0700 09/13 0659 09/13 0700 09/14 0659 09/14 0700  09/15 0659    P.O.  460 110    Total Intake(mL/kg)  460 (6.2) 110 (1.5)    Urine (mL/kg/hr)  400 400 (1.6)    Stool  0 0    Total Output  400 400    Net  +60 -290           Urine Occurrence  2 x 1 x    Stool Occurrence  0 x 0 x             Physical Exam  Constitutional:       General: He is not in acute distress.     Appearance: Normal appearance. He is not ill-appearing.   HENT:      Head: Normocephalic and atraumatic.      Nose: No congestion or rhinorrhea.      Mouth/Throat:      Mouth: Mucous membranes are dry.   Eyes:      Extraocular Movements: Extraocular movements intact.   Cardiovascular:      Rate and Rhythm: Normal rate and regular rhythm.   Pulmonary:      Effort: Pulmonary effort is normal.   Abdominal:      General: Abdomen is flat. There is no distension.      Tenderness: There is no abdominal tenderness.    Musculoskeletal:      Cervical back: Normal range of motion. No rigidity.      Right lower leg: No edema.      Left lower leg: No edema.   Neurological:      General: No focal deficit present.      Mental Status: He is alert and oriented to person, place, and time.            Significant Labs:   All pertinent labs from the last 24 hours have been reviewed.    Diagnostic Results:  I have reviewed all pertinent imaging results/findings within the past 24 hours.  Assessment/Plan:     * Myelodysplastic syndrome  From Dr Hickey clinic note 8/5:  Stem cell transplant candidate: We discussed the role for allogeneic stem cell transplantation in this disease process as a potentially curative option. We had an extensive discussion about the rationale, logistics, risks, and benefits. We reviewed the requirement to stay in the [place] area for 100 days with a caregiver at all times. We discussed the risks, including infection, graft failure, organ toxicity, graft versus host disease, relapse of disease, and secondary cancers. We reviewed the need for long-term immunosuppression and need for close monitoring. HCT-CI of 1 (intermediate risk). We had a prolonged discussion today regarding his recent deconditioning, Cdiff, and underlying disease. He is now much improved since last seen, and is improving his strength since starting to work with PT. Diarrhea now controlled. We discussed that our plan to move forward with the transplant process.   Coordinator: Fay Stephens  Regimen:  + 2Gy TBI  Donor: son (haplo)   Graft source: BM    Pancytopenia  -Transfusion 1 u pRBC  -CBC daily    Hypertension, essential  -holding coreg for fluid responsive hypotension    Coronary artery disease involving native coronary artery of native heart without angina pectoris  -holding coreg for hypotension  -resume as tolerated/indicated        VTE Risk Mitigation (From admission, onward)           Ordered     heparin, porcine (PF) 100  unit/mL injection flush 300 Units  As needed (PRN)         09/13/24 0853                    Disposition: TBD    Giles Reese MD  Bone Marrow Transplant  [facility] - Oncology ([facility])

## 2024-09-14 NOTE — NURSING
Contacted on-call MD regarding BP drop to MAP of 61 (see flowsheet). 500 ml fluid bolus ordered. Fluid bolus begun.

## 2024-09-14 NOTE — ASSESSMENT & PLAN NOTE
From Dr Hickey clinic note 8/5:  Stem cell transplant candidate: We discussed the role for allogeneic stem cell transplantation in this disease process as a potentially curative option. We had an extensive discussion about the rationale, logistics, risks, and benefits. We reviewed the requirement to stay in the [place] area for 100 days with a caregiver at all times. We discussed the risks, including infection, graft failure, organ toxicity, graft versus host disease, relapse of disease, and secondary cancers. We reviewed the need for long-term immunosuppression and need for close monitoring. HCT-CI of 1 (intermediate risk). We had a prolonged discussion today regarding his recent deconditioning, Cdiff, and underlying disease. He is now much improved since last seen, and is improving his strength since starting to work with PT. Diarrhea now controlled. We discussed that our plan to move forward with the transplant process.   Coordinator: Fay Stephens  Regimen:  + 2Gy TBI  Donor: son (haplo)   Graft source: BM

## 2024-09-14 NOTE — H&P
Janusz Ma - Oncology ([facility]  Hematology  Bone Marrow Transplant  H&P    Subjective:     Principal Problem: Myelodysplastic syndrome    HPI: 68 y/o PMH MDS, PVD, CAD, HTN admitted for haploSCT for tx of MDS. Reports mild soreness at CVC placement site, otherwise no complaints reported.     Patient information was obtained from patient, relative(s), past medical records, and ER records.     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 NGS with ASXL1 (44%), EZH2 (87%), IDH2 (42%), STAG2 (89%), U2AF1 (3%). He started treatment with azacitidine 75 mg/m2 daily x7 days plus venetoclax 100mg (voriconazole) daily x14 of 28 days. Venetoclax decreased to 7 days with cycle 3. Bone marrow biopsy 9/6/23 revealed dysplastic changes but blasts were not increased. Diploid karyotype. NGS with ASXL1 (16%). Repeat bone marrow biopsy on 4/23/24 noted persistent MDS without increased blasts. NGS with ASXL1 (25%), EZH2 (45%), IDH2 (19%), RUNX1 (19%), STAG2 (37%). Bone marrow biopsy 6/18/24 showed persistent MDS without blasts.  Now finished with Taz/Aza. Will allow time to recovery in anticipation of his allo transplant.    Medications Prior to Admission   Medication Sig Dispense Refill Last Dose    acyclovir (ZOVIRAX) 400 MG tablet Take 1 tablet (400 mg total) by mouth 2 (two) times daily. 60 tablet 11     carvediloL (COREG) 6.25 MG tablet Take 1 tablet (6.25 mg total) by mouth 2 (two) times daily. 180 tablet 3     gabapentin (NEURONTIN) 300 MG capsule Take 2 capsules (600 mg total) by mouth 3 (three) times daily. (Patient taking differently: Take 600 mg by mouth 2 (two) times daily.) 180 capsule 11     letermovir (PREVYMIS) 480 mg Tab Take 480 mg by mouth Daily. (Patient not taking: Reported on 9/12/2024) 28 tablet 9     levoFLOXacin (LEVAQUIN) 500 MG tablet Take 1 tablet (500 mg total) by mouth once daily. 30 tablet 11     LIDOcaine (XYLOCAINE) 5  % Oint ointment Apply topically 3 (three) times daily as needed (abdominal pain). 50 g 2     pantoprazole (PROTONIX) 40 MG tablet Take 1 tablet (40 mg total) by mouth once daily. (Patient not taking: Reported on 2024) 30 tablet 3     posaconazole (NOXAFIL) 100 mg TbEC tablet Take 3 tablets (300 mg total) by mouth once daily. 90 tablet 11     traZODone (DESYREL) 50 MG tablet Take 1 tablet (50 mg total) by mouth nightly as needed for Insomnia. 30 tablet 11     voriconazole (VFEND) 200 MG Tab Take 1 tablet (200 mg total) by mouth 2 (two) times daily. 60 tablet 11        Patient has no known allergies.     Past Medical History:   Diagnosis Date    Anticoagulant long-term use     Coronary artery disease     Hypertension     Myelodysplastic syndrome     Peripheral vascular disease, unspecified      Past Surgical History:   Procedure Laterality Date    BONE MARROW BIOPSY Left 2023    Procedure: Biopsy-bone marrow;  Surgeon: Harry Diamond MD;  Location: [facility] OR;  Service: Oncology;  Laterality: Left;    COLONOSCOPY N/A 2022    Procedure: COLONOSCOPY Golytely Vaccinated will bring cards;  Surgeon: Dereje Simon MD;  Location: [facility] ENDO;  Service: Endoscopy;  Laterality: N/A;  Do not cancel this order     Family History       Problem Relation (Age of Onset)    Cancer Father, Brother    Hypertension Mother    No Known Problems Daughter, Daughter, Son          Tobacco Use    Smoking status: Former     Current packs/day: 0.00     Average packs/day: 0.3 packs/day for 50.0 years (12.5 ttl pk-yrs)     Types: Cigarettes     Start date: 3/1/1973     Quit date: 3/1/2023     Years since quittin.5     Passive exposure: Past    Smokeless tobacco: Never   Substance and Sexual Activity    Alcohol use: Not Currently    Drug use: Never    Sexual activity: Not Currently     Partners: Female       Review of Systems   Constitutional:  Negative for chills, fatigue and fever.   Respiratory:  Negative for chest  tightness, shortness of breath and wheezing.    Cardiovascular:  Negative for chest pain and leg swelling.   Gastrointestinal:  Negative for abdominal pain, diarrhea, nausea and vomiting.   Genitourinary:  Negative for flank pain.   Musculoskeletal:  Negative for back pain, joint swelling and myalgias.   Neurological:  Negative for syncope, weakness and light-headedness.   Psychiatric/Behavioral:  Negative for confusion.      Objective:     Vital Signs (Most Recent):  Temp: 97.6 °F (36.4 °C) (09/13/24 1902)  Pulse: 68 (09/13/24 1902)  Resp: 19 (09/13/24 1902)  BP: 109/70 (09/13/24 1902)  SpO2: 96 % (09/13/24 1902) Vital Signs (24h Range):  Temp:  [97.6 °F (36.4 °C)-98.8 °F (37.1 °C)] 97.6 °F (36.4 °C)  Pulse:  [66-78] 68  Resp:  [16-22] 19  SpO2:  [96 %-100 %] 96 %  BP: ()/(51-70) 109/70     Weight: 74.3 kg (163 lb 12.8 oz)  Body mass index is 24.19 kg/m².  Body surface area is 1.9 meters squared.    ECOG SCORE           [unfilled]    Lines/Drains/Airways       Central Venous Catheter Line  Duration             Tunneled Central Line - Triple Lumen 09/13/24 1625 Internal Jugular Right <1 day                     Physical Exam  Constitutional:       Appearance: Normal appearance.   HENT:      Head: Normocephalic and atraumatic.   Eyes:      Extraocular Movements: Extraocular movements intact.   Pulmonary:      Effort: Pulmonary effort is normal.   Abdominal:      General: Abdomen is flat.   Neurological:      General: No focal deficit present.      Mental Status: He is alert and oriented to person, place, and time.     Physical exam limited by virtual assessment       Significant Labs:   CBC:   Recent Labs   Lab 09/12/24  1230   WBC 1.35*   HGB 7.2*   HCT 21.9*   PLT 54*    and CMP:   Recent Labs   Lab 09/12/24  1230      K 4.2      CO2 27   *   BUN 14   CREATININE 1.0   CALCIUM 9.3   PROT 6.8   ALBUMIN 3.7   BILITOT 0.3   ALKPHOS 83   AST 28   ALT 40   ANIONGAP 6*       Diagnostic Results:  I  have reviewed all pertinent imaging results/findings within the past 24 hours.  Assessment/Plan:     Cardiac/Vascular  Hypertension, essential  Continue Coreg    Coronary artery disease involving native coronary artery of native heart without angina pectoris  Continue BB    Oncology  * Myelodysplastic syndrome  From Dr Hickey clinic note 8/5:  Stem cell transplant candidate: We discussed the role for allogeneic stem cell transplantation in this disease process as a potentially curative option. We had an extensive discussion about the rationale, logistics, risks, and benefits. We reviewed the requirement to stay in the [place] area for 100 days with a caregiver at all times. We discussed the risks, including infection, graft failure, organ toxicity, graft versus host disease, relapse of disease, and secondary cancers. We reviewed the need for long-term immunosuppression and need for close monitoring. HCT-CI of 1 (intermediate risk). We had a prolonged discussion today regarding his recent deconditioning, Cdiff, and underlying disease. He is now much improved since last seen, and is improving his strength since starting to work with PT. Diarrhea now controlled. We discussed that our plan to move forward with the transplant process.   Coordinator: Fay Stephens  Regimen:  + 2Gy TBI  Donor: son (haplo)   Graft source: BM        VTE Risk Mitigation (From admission, onward)      None          The attending portion of this evaluation, treatment, and documentation was performed per Abdullahi Rodrigues MD via Telemedicine AudioVisual using the secure [employer] software platform with 2 way audio/video. The provider was located off-site and the patient is located in the hospital. The aforementioned video software was utilized to document the relevant history and physical exam       Abdullahi Rodrigues MD  Bone Marrow Transplant  Hematology  [facility] - Oncology ([facility])

## 2024-09-14 NOTE — CONSULTS
"Janusz Ma - Oncology ([facility])  Adult Nutrition  Consult Note    SUMMARY     Recommendations    Continue Regular diet.  RD to monitor & follow-up.    Goals: Meet % EEN, EPN by RD f/u date  Nutrition Goal Status: new  Communication of RD Recs: reviewed with RN    Assessment and Plan    Nutrition Problem:  Increased nutrient needs    Related to (etiology):   Physiological causes     Signs and Symptoms (as evidenced by):   Upcoming SCT    Interventions(treatment strategy):  Collaboration of nutrition care w/ other providers    Nutrition Diagnosis Status:   New     Reason for Assessment    Reason For Assessment: consult  Diagnosis: other (see comments) Myelodysplastic syndrome   Relevant Medical History: HTN  Interdisciplinary Rounds: did not attend    General Information Comments: Pt resting, spoke w/ family member at bedside - reports pt w/ good appetite PTA/currently & UBW of 165#. Pt appears nourished w/ no indicators of malnutrition. Noted Allo SCT being discussed.   Nutrition Discharge Planning: Post transplant nutrition education provided. Food safety/drug interactions emphasized. General healthy diet recommended. RD name/contact information, education material left.  No other needs identified.    Nutrition/Diet History    Patient Reported Diet/Restrictions/Preferences: general  Food Allergies: NKFA  Factors Affecting Nutritional Intake: None identified at this time    Anthropometrics    Temp: 98.8 °F (37.1 °C)  Height Method: Stated  Height: 5' 9" (175.3 cm)  Height (inches): 69 in  Weight Method: Standard Scale  Weight: 74.2 kg (163 lb 9.3 oz)  Weight (lb): 163.58 lb  Ideal Body Weight (IBW), Male: 160 lb  % Ideal Body Weight, Male (lb): 102.24 %  BMI (Calculated): 24.1  BMI Grade: 18.5-24.9 - normal    Lab/Procedures/Meds    Pertinent Labs Reviewed: reviewed  Pertinent Medications Reviewed: reviewed    Estimated/Assessed Needs    Weight Used For Calorie Calculations: 74.2 kg (163 lb 9.3 oz)    Energy " Calorie Requirements (kcal): 1871 kcal/d  Energy Need Method: [place]-St Jeor (1.25 PAL)    Protein Requirements: 89 g/d (1.2 g/kg)  Weight Used For Protein Calculations: 74.2 kg (163 lb 9.3 oz)    Estimated Fluid Requirement Method: other (see comments) (Per MD)  RDA Method (mL): 1871    Nutrition Prescription Ordered    Current Diet Order: Regular    Evaluation of Received Nutrient/Fluid Intake    I/O: +6L since admit    Comments: LBM: 9/13    Tolerance: tolerating    Nutrition Risk    Level of Risk/Frequency of Follow-up:  (1x/week)     Monitor and Evaluation    Food and Nutrient Intake: food and beverage intake, energy intake  Food and Nutrient Adminstration: diet order  Physical Activity and Function: nutrition-related ADLs and IADLs  Anthropometric Measurements: weight change, weight  Biochemical Data, Medical Tests and Procedures: gastrointestinal profile, inflammatory profile, lipid profile, glucose/endocrine profile, electrolyte and renal panel  Nutrition-Focused Physical Findings: overall appearance     Nutrition Follow-Up    RD Follow-up?: Yes

## 2024-09-14 NOTE — PLAN OF CARE
Recommendations     Continue Regular diet.  RD to monitor & follow-up.     Goals: Meet % EEN, EPN by RD f/u date  Nutrition Goal Status: new  Communication of RD Recs: reviewed with RN

## 2024-09-14 NOTE — PLAN OF CARE
Patient is AAOx4. No acute events this shift. Up, independent and family at bedside. Afebrile & VSS on room air. No PRNs needed. Patient received 1 unit of PRBCs today and tolerated well. Electrolytes closely monitored and replaced per PRN scale. Ambulates independently to bathroom; educated on importance of I/O recording. CHG wipes provided and encouraged use. Tolerating diet with good intake and voiding without difficulty.  Pt remaining free from falls or injury throughout shift; bed locked and in lowest position; call light within reach. POC reviewed with patient and family at bedside. Patient involved in care. All needs addressed. Frequent rounding performed. Patient is stable at this time.

## 2024-09-15 LAB
ALBUMIN SERPL BCP-MCNC: 3 G/DL (ref 3.5–5.2)
ALP SERPL-CCNC: 72 U/L (ref 55–135)
ALT SERPL W/O P-5'-P-CCNC: 22 U/L (ref 10–44)
ANION GAP SERPL CALC-SCNC: 5 MMOL/L (ref 8–16)
ANISOCYTOSIS BLD QL SMEAR: SLIGHT
AST SERPL-CCNC: 17 U/L (ref 10–40)
BASO STIPL BLD QL SMEAR: ABNORMAL
BASOPHILS # BLD AUTO: 0 K/UL (ref 0–0.2)
BASOPHILS NFR BLD: 0 % (ref 0–1.9)
BILIRUB SERPL-MCNC: 0.5 MG/DL (ref 0.1–1)
BLD PROD TYP BPU: NORMAL
BLOOD UNIT EXPIRATION DATE: NORMAL
BLOOD UNIT TYPE CODE: 7300
BLOOD UNIT TYPE: NORMAL
BUN SERPL-MCNC: 9 MG/DL (ref 8–23)
CALCIUM SERPL-MCNC: 8.5 MG/DL (ref 8.7–10.5)
CHLORIDE SERPL-SCNC: 108 MMOL/L (ref 95–110)
CO2 SERPL-SCNC: 26 MMOL/L (ref 23–29)
CODING SYSTEM: NORMAL
CREAT SERPL-MCNC: 0.8 MG/DL (ref 0.5–1.4)
CROSSMATCH INTERPRETATION: NORMAL
DIFFERENTIAL METHOD BLD: ABNORMAL
DISPENSE STATUS: NORMAL
EOSINOPHIL # BLD AUTO: 0 K/UL (ref 0–0.5)
EOSINOPHIL NFR BLD: 1.5 % (ref 0–8)
ERYTHROCYTE [DISTWIDTH] IN BLOOD BY AUTOMATED COUNT: 15.1 % (ref 11.5–14.5)
EST. GFR  (NO RACE VARIABLE): >60 ML/MIN/1.73 M^2
GLUCOSE SERPL-MCNC: 97 MG/DL (ref 70–110)
HCT VFR BLD AUTO: 21.1 % (ref 40–54)
HGB BLD-MCNC: 6.9 G/DL (ref 14–18)
IMM GRANULOCYTES # BLD AUTO: 0 K/UL (ref 0–0.04)
IMM GRANULOCYTES NFR BLD AUTO: 0 % (ref 0–0.5)
LYMPHOCYTES # BLD AUTO: 0.6 K/UL (ref 1–4.8)
LYMPHOCYTES NFR BLD: 48.5 % (ref 18–48)
MAGNESIUM SERPL-MCNC: 2 MG/DL (ref 1.6–2.6)
MCH RBC QN AUTO: 30 PG (ref 27–31)
MCHC RBC AUTO-ENTMCNC: 32.7 G/DL (ref 32–36)
MCV RBC AUTO: 92 FL (ref 82–98)
MONOCYTES # BLD AUTO: 0.1 K/UL (ref 0.3–1)
MONOCYTES NFR BLD: 6.2 % (ref 4–15)
NEUTROPHILS # BLD AUTO: 0.6 K/UL (ref 1.8–7.7)
NEUTROPHILS NFR BLD: 43.8 % (ref 38–73)
NRBC BLD-RTO: 0 /100 WBC
NUM UNITS TRANS PACKED RBC: NORMAL
OVALOCYTES BLD QL SMEAR: ABNORMAL
PHOSPHATE SERPL-MCNC: 3.6 MG/DL (ref 2.7–4.5)
PLATELET # BLD AUTO: 51 K/UL (ref 150–450)
PMV BLD AUTO: 11.6 FL (ref 9.2–12.9)
POIKILOCYTOSIS BLD QL SMEAR: SLIGHT
POLYCHROMASIA BLD QL SMEAR: ABNORMAL
POTASSIUM SERPL-SCNC: 4 MMOL/L (ref 3.5–5.1)
PROT SERPL-MCNC: 5.3 G/DL (ref 6–8.4)
RBC # BLD AUTO: 2.3 M/UL (ref 4.6–6.2)
SODIUM SERPL-SCNC: 139 MMOL/L (ref 136–145)
WBC # BLD AUTO: 1.3 K/UL (ref 3.9–12.7)

## 2024-09-15 PROCEDURE — 97165 OT EVAL LOW COMPLEX 30 MIN: CPT

## 2024-09-15 PROCEDURE — 94799 UNLISTED PULMONARY SVC/PX: CPT

## 2024-09-15 PROCEDURE — 63600175 PHARM REV CODE 636 W HCPCS: Mod: JZ,JG | Performed by: INTERNAL MEDICINE

## 2024-09-15 PROCEDURE — P9040 RBC LEUKOREDUCED IRRADIATED: HCPCS | Performed by: STUDENT IN AN ORGANIZED HEALTH CARE EDUCATION/TRAINING PROGRAM

## 2024-09-15 PROCEDURE — 36430 TRANSFUSION BLD/BLD COMPNT: CPT

## 2024-09-15 PROCEDURE — 99232 SBSQ HOSP IP/OBS MODERATE 35: CPT | Mod: GC,,, | Performed by: INTERNAL MEDICINE

## 2024-09-15 PROCEDURE — 84100 ASSAY OF PHOSPHORUS: CPT | Performed by: NURSE PRACTITIONER

## 2024-09-15 PROCEDURE — 80053 COMPREHEN METABOLIC PANEL: CPT | Performed by: NURSE PRACTITIONER

## 2024-09-15 PROCEDURE — 25000003 PHARM REV CODE 250: Performed by: INTERNAL MEDICINE

## 2024-09-15 PROCEDURE — 94761 N-INVAS EAR/PLS OXIMETRY MLT: CPT

## 2024-09-15 PROCEDURE — 83735 ASSAY OF MAGNESIUM: CPT | Performed by: NURSE PRACTITIONER

## 2024-09-15 PROCEDURE — 85025 COMPLETE CBC W/AUTO DIFF WBC: CPT | Performed by: NURSE PRACTITIONER

## 2024-09-15 PROCEDURE — 20600001 HC STEP DOWN PRIVATE ROOM

## 2024-09-15 PROCEDURE — 97530 THERAPEUTIC ACTIVITIES: CPT

## 2024-09-15 PROCEDURE — 25000003 PHARM REV CODE 250: Performed by: NURSE PRACTITIONER

## 2024-09-15 PROCEDURE — 86920 COMPATIBILITY TEST SPIN: CPT | Performed by: STUDENT IN AN ORGANIZED HEALTH CARE EDUCATION/TRAINING PROGRAM

## 2024-09-15 RX ORDER — MUPIROCIN 20 MG/G
OINTMENT TOPICAL 2 TIMES DAILY
Status: CANCELLED | OUTPATIENT
Start: 2024-09-15 | End: 2024-09-20

## 2024-09-15 RX ORDER — HYDROCODONE BITARTRATE AND ACETAMINOPHEN 500; 5 MG/1; MG/1
TABLET ORAL
Status: DISCONTINUED | OUTPATIENT
Start: 2024-09-15 | End: 2024-09-18

## 2024-09-15 RX ORDER — HYDROCODONE BITARTRATE AND ACETAMINOPHEN 500; 5 MG/1; MG/1
TABLET ORAL
Status: DISCONTINUED | OUTPATIENT
Start: 2024-09-15 | End: 2024-09-20

## 2024-09-15 RX ADMIN — VANCOMYCIN HYDROCHLORIDE 125 MG: KIT at 08:09

## 2024-09-15 RX ADMIN — SODIUM CHLORIDE AND POTASSIUM CHLORIDE 150 ML/HR: .9; .15 SOLUTION INTRAVENOUS at 10:09

## 2024-09-15 RX ADMIN — GABAPENTIN 600 MG: 300 CAPSULE ORAL at 09:09

## 2024-09-15 RX ADMIN — ONDANSETRON 8 MG: 8 TABLET, ORALLY DISINTEGRATING ORAL at 01:09

## 2024-09-15 RX ADMIN — URSODIOL 300 MG: 300 CAPSULE ORAL at 09:09

## 2024-09-15 RX ADMIN — ONDANSETRON 8 MG: 8 TABLET, ORALLY DISINTEGRATING ORAL at 08:09

## 2024-09-15 RX ADMIN — ACYCLOVIR 800 MG: 200 CAPSULE ORAL at 08:09

## 2024-09-15 RX ADMIN — GABAPENTIN 600 MG: 300 CAPSULE ORAL at 08:09

## 2024-09-15 RX ADMIN — Medication 1 DOSE: at 04:09

## 2024-09-15 RX ADMIN — FLUDARABINE PHOSPHATE 75 MG: 25 INJECTION, SOLUTION INTRAVENOUS at 10:09

## 2024-09-15 RX ADMIN — SODIUM CHLORIDE AND POTASSIUM CHLORIDE 150 ML/HR: .9; .15 SOLUTION INTRAVENOUS at 02:09

## 2024-09-15 RX ADMIN — Medication 1 DOSE: at 09:09

## 2024-09-15 RX ADMIN — VANCOMYCIN HYDROCHLORIDE 125 MG: KIT at 10:09

## 2024-09-15 RX ADMIN — ONDANSETRON 8 MG: 8 TABLET, ORALLY DISINTEGRATING ORAL at 05:09

## 2024-09-15 RX ADMIN — OLANZAPINE 5 MG: 2.5 TABLET, FILM COATED ORAL at 09:09

## 2024-09-15 RX ADMIN — Medication 1 DOSE: at 08:09

## 2024-09-15 RX ADMIN — PANTOPRAZOLE SODIUM 40 MG: 40 TABLET, DELAYED RELEASE ORAL at 09:09

## 2024-09-15 RX ADMIN — SODIUM CHLORIDE AND POTASSIUM CHLORIDE 150 ML/HR: .9; .15 SOLUTION INTRAVENOUS at 08:09

## 2024-09-15 RX ADMIN — OLANZAPINE 5 MG: 2.5 TABLET, FILM COATED ORAL at 08:09

## 2024-09-15 RX ADMIN — Medication 1 DOSE: at 01:09

## 2024-09-15 RX ADMIN — ACYCLOVIR 800 MG: 200 CAPSULE ORAL at 09:09

## 2024-09-15 RX ADMIN — URSODIOL 300 MG: 300 CAPSULE ORAL at 08:09

## 2024-09-15 NOTE — PLAN OF CARE
Day -4 Flu/[place] SCT. Patient AAOx4. Afebrile and VSS on room air. Tmax of 100.1 that did not sustain. NaCl w/ K+ infusing at 150. Patient is up independently to the bathroom, free from falls and injury. Urinal provided and within reach. Monitoring I&Os, tolerating diet, and CHG wipes given for self care. Patient's wife is at bedside. Education performed via . Bed is locked and in lowest position, non-skid socks on, and call light within reach. Patient educated on using the call light when needing assistance and verbalizes understanding. Plan of care reviewed and is ongoing. Patient expresses no other needs at this time.

## 2024-09-15 NOTE — PLAN OF CARE
OT evaluation completed. OT POC and goals established.     Problem: Occupational Therapy  Goal: Occupational Therapy Goal  Description: Goals to be met by: 9/29/2024     Patient will increase functional independence with ADLs by performing:    Pt will perform functional mobility for household and community distances with no AD and independence for 2 consecutive sessions.   Pt will perform grooming while standing at sink with independence for 2 consecutive sessions.    Pt will perform all dressing tasks with Mod I for 2 consecutive sessions.     Outcome: Progressing

## 2024-09-15 NOTE — ANESTHESIA POSTPROCEDURE EVALUATION
Anesthesia Post Evaluation    Patient: Guillaume Salinas    Procedure(s) Performed: Procedure(s) (LRB):  INSERTION, CATHETER, CENTRAL VENOUS, LEMONS TRIPLE LUMEN (Right)    Final Anesthesia Type: general      Patient location during evaluation: PACU  Patient participation: Yes- Able to Participate  Level of consciousness: awake and alert  Post-procedure vital signs: reviewed and stable  Pain management: adequate  Airway patency: patent    PONV status at discharge: No PONV  Anesthetic complications: no      Cardiovascular status: blood pressure returned to baseline  Respiratory status: unassisted  Hydration status: euvolemic  Follow-up not needed.              Vitals Value Taken Time   /58 09/13/24 1800   Temp  09/15/24 1030   Pulse 72 09/13/24 1800   Resp 20 09/13/24 1800   SpO2 99 % 09/13/24 1800         No case tracking events are documented in the log.      Pain/Vaibhav Score: Vaibhav Score: 10 (9/14/2024  8:00 AM)

## 2024-09-15 NOTE — PROGRESS NOTES
Janusz Ma - Oncology ([facility])  Hematology  Bone Marrow Transplant  Progress Note    Patient Name: Guillaume Salinas  Admission Date: 9/13/2024  Hospital Length of Stay: 2 days  Code Status: Full Code    Subjective:     Interval History: Day -4 Flu/shilpi 100 conditioning for HaploSCT. No new complaints or symptoms, transfusing 1u pRBC    Objective:     Vital Signs (Most Recent):  Temp: 98.5 °F (36.9 °C) (09/15/24 0758)  Pulse: 65 (09/15/24 0758)  Resp: 18 (09/15/24 0758)  BP: (!) 123/59 (09/15/24 0758)  SpO2: (!) 94 % (09/15/24 0758) Vital Signs (24h Range):  Temp:  [98 °F (36.7 °C)-100.1 °F (37.8 °C)] 98.5 °F (36.9 °C)  Pulse:  [65-86] 65  Resp:  [17-18] 18  SpO2:  [94 %-96 %] 94 %  BP: ()/(52-71) 123/59     Weight: 76.3 kg (168 lb 3.4 oz)  Body mass index is 24.84 kg/m².  Body surface area is 1.93 meters squared.    ECOG SCORE           [unfilled]    Intake/Output - Last 3 Shifts         09/13 0700  09/14 0659 09/14 0700  09/15 0659 09/15 0700  09/16 0659    P.O. 460 978 233    Blood  350     Total Intake(mL/kg) 460 (6.2) 1328 (17.4) 233 (3.1)    Urine (mL/kg/hr) 400 750 (0.4)     Stool 0 0     Total Output 400 750     Net +60 +578 +233           Urine Occurrence 2 x 7 x 3 x    Stool Occurrence 0 x 0 x 0 x             Physical Exam  Constitutional:       General: He is not in acute distress.     Appearance: Normal appearance. He is not ill-appearing.   HENT:      Head: Normocephalic and atraumatic.      Nose: No congestion or rhinorrhea.      Mouth/Throat:      Mouth: Mucous membranes are dry.   Eyes:      Extraocular Movements: Extraocular movements intact.   Cardiovascular:      Rate and Rhythm: Normal rate and regular rhythm.   Pulmonary:      Effort: Pulmonary effort is normal.   Abdominal:      General: Abdomen is flat. There is no distension.      Tenderness: There is no abdominal tenderness.   Musculoskeletal:      Cervical back: Normal range of motion. No rigidity.      Right lower leg: No  edema.      Left lower leg: No edema.   Neurological:      General: No focal deficit present.      Mental Status: He is alert and oriented to person, place, and time.            Significant Labs:   All pertinent labs from the last 24 hours have been reviewed.    Diagnostic Results:  I have reviewed all pertinent imaging results/findings within the past 24 hours.  Assessment/Plan:     * Myelodysplastic syndrome  From Dr Hickey clinic note 8/5:  Stem cell transplant candidate: We discussed the role for allogeneic stem cell transplantation in this disease process as a potentially curative option. We had an extensive discussion about the rationale, logistics, risks, and benefits. We reviewed the requirement to stay in the [place] area for 100 days with a caregiver at all times. We discussed the risks, including infection, graft failure, organ toxicity, graft versus host disease, relapse of disease, and secondary cancers. We reviewed the need for long-term immunosuppression and need for close monitoring. HCT-CI of 1 (intermediate risk). We had a prolonged discussion today regarding his recent deconditioning, Cdiff, and underlying disease. He is now much improved since last seen, and is improving his strength since starting to work with PT. Diarrhea now controlled. We discussed that our plan to move forward with the transplant process.   Coordinator: Fay Stephens  Regimen:  + 2Gy TBI  Donor: son (haplo)   Graft source: BM    Pancytopenia  -Transfusion 1 u pRBC  -CBC daily    Hypertension, essential  -holding coreg for fluid responsive hypotension    Coronary artery disease involving native coronary artery of native heart without angina pectoris  -holding coreg for hypotension  -resume as tolerated/indicated        VTE Risk Mitigation (From admission, onward)           Ordered     heparin, porcine (PF) 100 unit/mL injection flush 300 Units  As needed (PRN)         09/13/24 3881                    Disposition:  TBRIGOBERTO Reese MD  Bone Marrow Transplant  [facility] - Oncology ([facility])

## 2024-09-15 NOTE — PT/OT/SLP EVAL
Occupational Therapy   Evaluation and Tx    Name: Guillaume Salinas  MRN: [id number]  Admitting Diagnosis: Myelodysplastic syndrome  Recent Surgery: * No surgery found *      Recommendations:     Discharge Recommendations: No Therapy Indicated  Discharge Equipment Recommendations:  none  Barriers to discharge:  None    Assessment:     Guillaume Salinas is a 69 y.o. male with a medical diagnosis of Myelodysplastic syndrome.  He presents with the following performance deficits affecting function: impaired endurance, impaired self care skills, other (comment) (at risk for deconditioning).  Pt tolerated session well, which focused on evaluating pt's current functional status since admit and prior to SCT. Today, he was able to demo good strength and tolerance to all OOB mobility and was provided education on importance of OOB mobility while admitting to maintain current level of function and prevent deconditioning. Wife present throughout session and also verbalized understanding. Pt would benefit from continued skilled acute OT services in order to maximize (I) and with ADLs and functional mobility to ensure safe return to PLOF in the least restrictive environment. OT recommending no further OT once pt is medically appropriate for d/c.       Rehab Prognosis: Good; patient would benefit from acute skilled OT services to address these deficits and reach maximum level of function.       Plan:     Patient to be seen 2 x/week to address the above listed problems via self-care/home management, therapeutic activities, therapeutic exercises, neuromuscular re-education  Plan of Care Expires: 10/15/24  Plan of Care Reviewed with: patient, spouse    Subjective     Chief Complaint: None stated   Patient/Family Comments/goals: wife present and served as primary  throughout. Pt denied , prefers wife.     Occupational Profile:  Living Environment: Pt lives with spouse in [facility] with 0 MIR and t/s  combo with chair  Previous level of function: (I) PLOF   Roles and Routines: Drives; retired- was attending OP PT   Equipment Used at Home: none  Assistance upon Discharge: Wife (A) however not 100% of the time because she works part time     Pain/Comfort:  Pain Rating 1: 0/10  Pain Rating Post-Intervention 1: 0/10    Patients cultural, spiritual, Yazdanism conflicts given the current situation: no    Objective:     Communicated with: RN prior to session.  Patient found HOB elevated with Other (comments) (tunneled cath) upon OT entry to room.    General Precautions: Standard, fall  Orthopedic Precautions: N/A  Braces: N/A  Respiratory Status: Room air    Occupational Performance:    Bed Mobility:  HOB elevated   Patient completed Scooting/Bridging with modified independence  Patient completed Supine to Sit with modified independence  Patient completed Sit to Supine with modified independence    Functional Mobility/Transfers:  Patient completed Sit <> Stand Transfer with supervision  with  no assistive device   Functional Mobility: Pt participated in room mobility to simulate home and community distances for ~30 ft with SPV 2/2 line management and no AD.       Activities of Daily Living:  Lower Body Dressing: Set-up assistance to don slip on shoes in sitting      Cognitive/Visual Perceptual:  Cognitive/Psychosocial Skills:     -       Oriented to: Person, Place, Time, and Situation   -       Follows Commands/attention:Follows one-step commands  -       Communication: clear/fluent  -       Safety awareness/insight to disability: intact   Visual/Perceptual:      -Intact      Physical Exam:  Balance:    -           Static sitting balance: (I)   - Static standing balance: SPV  - Dynamic standing balance:  SPV  Postural examination/scapula alignment:    -       No postural abnormalities identified  Skin integrity: Visible skin intact  Edema:  None noted  Sensation:    -       Intact  Upper Extremity Range of Motion:      -       Right Upper Extremity: WFL  -       Left Upper Extremity: WFL  Upper Extremity Strength:    -       Right Upper Extremity: WFL  -       Left Upper Extremity: WFL   Strength:    -       Right Upper Extremity: WFL  -       Left Upper Extremity: WFL    AMPAC 6 Click ADL:  AMPAC Total Score: 24    Treatment & Education:   Pt and family educated on:   Role of OT, POC, and d/c planning.   Safe transfer techniques and proper body mechanics for fall prevention and improved independence with functional transfers   Importance of OOB activities to increase endurance and tolerance for increased participation in daily ADLs.   Utilizing the call bell to request for assistance with all functional mobility to ensure safety during hospital stay.      Pt and family verbalized understanding and all questions were addressed within the scope of OT.     Patient left HOB elevated with all lines intact, call button in reach, RN notified, and spouse present    GOALS:   Multidisciplinary Problems       Occupational Therapy Goals          Problem: Occupational Therapy    Goal Priority Disciplines Outcome Interventions   Occupational Therapy Goal     OT, PT/OT Progressing    Description: Goals to be met by: 9/29/2024     Patient will increase functional independence with ADLs by performing:    Pt will perform functional mobility for household and community distances with no AD and independence for 2 consecutive sessions.   Pt will perform grooming while standing at sink with independence for 2 consecutive sessions.    Pt will perform all dressing tasks with Mod I for 2 consecutive sessions.                          History:     Past Medical History:   Diagnosis Date    Anticoagulant long-term use     Coronary artery disease     Hypertension     Myelodysplastic syndrome     Peripheral vascular disease, unspecified          Past Surgical History:   Procedure Laterality Date    BONE MARROW BIOPSY Left 4/26/2023    Procedure:  Biopsy-bone marrow;  Surgeon: Harry Diamond MD;  Location: [facility] OR;  Service: Oncology;  Laterality: Left;    COLONOSCOPY N/A 01/05/2022    Procedure: COLONOSCOPY Golytely Vaccinated will bring cards;  Surgeon: Dereje Simon MD;  Location: [facility] ENDO;  Service: Endoscopy;  Laterality: N/A;  Do not cancel this order    INSERTION OF LEMONS CATHETER Right 9/13/2024    Procedure: INSERTION, CATHETER, CENTRAL VENOUS, LEMONS TRIPLE LUMEN;  Surgeon: Kg Patten MD;  Location: [address];  Service: General;  Laterality: Right;       Time Tracking:     OT Date of Treatment: 09/15/24  OT Start Time: 0936  OT Stop Time: 0951  OT Total Time (min): 15 min    Billable Minutes:Evaluation 5  Therapeutic Activity 10    9/15/2024

## 2024-09-15 NOTE — PROGRESS NOTES
09/14/24 1921 09/14/24 2020   Vital Signs   Temp 100.1 °F (37.8 °C) 99.3 °F (37.4 °C)   Temp Source Oral Oral     Called and left a voicemail for Dr. Celso Carias. Patient was under wool blanket when I walked into room to do vitals. Rechecked one hour later.

## 2024-09-15 NOTE — PLAN OF CARE
Day -4 Flu/Rosalee SCT. Patient is AAOx4. No acute events this shift. Up, independent and family at bedside. Afebrile & VSS on room air. No PRNs needed. Patient received 1 unit of PRBCs today and tolerated well. Electrolytes closely monitored and replaced per PRN scale. Ambulates independently to bathroom; educated on importance of I/O recording with urinal. CHG wipes provided and encouraged use. Tolerating diet with good intake and voiding without difficulty.  Pt remaining free from falls or injury throughout shift; bed locked and in lowest position; call light within reach. POC reviewed with patient and family at bedside. Patient involved in care. All needs addressed. Frequent rounding performed. Patient is stable at this time.

## 2024-09-15 NOTE — SUBJECTIVE & OBJECTIVE
Subjective:     Interval History: Day -4 Flu/shilpi 100 conditioning for HaploSCT. No new complaints or symptoms, transfusing 1u pRBC    Objective:     Vital Signs (Most Recent):  Temp: 98.5 °F (36.9 °C) (09/15/24 0758)  Pulse: 65 (09/15/24 0758)  Resp: 18 (09/15/24 0758)  BP: (!) 123/59 (09/15/24 0758)  SpO2: (!) 94 % (09/15/24 0758) Vital Signs (24h Range):  Temp:  [98 °F (36.7 °C)-100.1 °F (37.8 °C)] 98.5 °F (36.9 °C)  Pulse:  [65-86] 65  Resp:  [17-18] 18  SpO2:  [94 %-96 %] 94 %  BP: ()/(52-71) 123/59     Weight: 76.3 kg (168 lb 3.4 oz)  Body mass index is 24.84 kg/m².  Body surface area is 1.93 meters squared.    ECOG SCORE           [unfilled]    Intake/Output - Last 3 Shifts         09/13 0700  09/14 0659 09/14 0700  09/15 0659 09/15 0700  09/16 0659    P.O. 460 978 233    Blood  350     Total Intake(mL/kg) 460 (6.2) 1328 (17.4) 233 (3.1)    Urine (mL/kg/hr) 400 750 (0.4)     Stool 0 0     Total Output 400 750     Net +60 +578 +233           Urine Occurrence 2 x 7 x 3 x    Stool Occurrence 0 x 0 x 0 x             Physical Exam  Constitutional:       General: He is not in acute distress.     Appearance: Normal appearance. He is not ill-appearing.   HENT:      Head: Normocephalic and atraumatic.      Nose: No congestion or rhinorrhea.      Mouth/Throat:      Mouth: Mucous membranes are dry.   Eyes:      Extraocular Movements: Extraocular movements intact.   Cardiovascular:      Rate and Rhythm: Normal rate and regular rhythm.   Pulmonary:      Effort: Pulmonary effort is normal.   Abdominal:      General: Abdomen is flat. There is no distension.      Tenderness: There is no abdominal tenderness.   Musculoskeletal:      Cervical back: Normal range of motion. No rigidity.      Right lower leg: No edema.      Left lower leg: No edema.   Neurological:      General: No focal deficit present.      Mental Status: He is alert and oriented to person, place, and time.            Significant Labs:   All pertinent labs  from the last 24 hours have been reviewed.    Diagnostic Results:  I have reviewed all pertinent imaging results/findings within the past 24 hours.

## 2024-09-16 LAB
ALBUMIN SERPL BCP-MCNC: 3.2 G/DL (ref 3.5–5.2)
ALP SERPL-CCNC: 78 U/L (ref 55–135)
ALT SERPL W/O P-5'-P-CCNC: 23 U/L (ref 10–44)
ANION GAP SERPL CALC-SCNC: 7 MMOL/L (ref 8–16)
ANISOCYTOSIS BLD QL SMEAR: SLIGHT
AST SERPL-CCNC: 19 U/L (ref 10–40)
BASOPHILS # BLD AUTO: 0.01 K/UL (ref 0–0.2)
BASOPHILS NFR BLD: 1.3 % (ref 0–1.9)
BILIRUB SERPL-MCNC: 0.4 MG/DL (ref 0.1–1)
BUN SERPL-MCNC: 8 MG/DL (ref 8–23)
CALCIUM SERPL-MCNC: 8.7 MG/DL (ref 8.7–10.5)
CHLORIDE SERPL-SCNC: 109 MMOL/L (ref 95–110)
CO2 SERPL-SCNC: 27 MMOL/L (ref 23–29)
CREAT SERPL-MCNC: 0.9 MG/DL (ref 0.5–1.4)
DIFFERENTIAL METHOD BLD: ABNORMAL
EOSINOPHIL # BLD AUTO: 0 K/UL (ref 0–0.5)
EOSINOPHIL NFR BLD: 2.5 % (ref 0–8)
ERYTHROCYTE [DISTWIDTH] IN BLOOD BY AUTOMATED COUNT: 14.3 % (ref 11.5–14.5)
EST. GFR  (NO RACE VARIABLE): >60 ML/MIN/1.73 M^2
GLUCOSE SERPL-MCNC: 113 MG/DL (ref 70–110)
HCT VFR BLD AUTO: 25.5 % (ref 40–54)
HGB BLD-MCNC: 8.6 G/DL (ref 14–18)
HYPOCHROMIA BLD QL SMEAR: ABNORMAL
IMM GRANULOCYTES # BLD AUTO: 0 K/UL (ref 0–0.04)
IMM GRANULOCYTES NFR BLD AUTO: 0 % (ref 0–0.5)
LYMPHOCYTES # BLD AUTO: 0.3 K/UL (ref 1–4.8)
LYMPHOCYTES NFR BLD: 32.5 % (ref 18–48)
MAGNESIUM SERPL-MCNC: 2 MG/DL (ref 1.6–2.6)
MCH RBC QN AUTO: 30.4 PG (ref 27–31)
MCHC RBC AUTO-ENTMCNC: 33.7 G/DL (ref 32–36)
MCV RBC AUTO: 90 FL (ref 82–98)
MONOCYTES # BLD AUTO: 0 K/UL (ref 0.3–1)
MONOCYTES NFR BLD: 5 % (ref 4–15)
NEUTROPHILS # BLD AUTO: 0.5 K/UL (ref 1.8–7.7)
NEUTROPHILS NFR BLD: 58.7 % (ref 38–73)
NRBC BLD-RTO: 0 /100 WBC
OVALOCYTES BLD QL SMEAR: ABNORMAL
PHOSPHATE SERPL-MCNC: 4.1 MG/DL (ref 2.7–4.5)
PLATELET # BLD AUTO: 43 K/UL (ref 150–450)
PMV BLD AUTO: 11.4 FL (ref 9.2–12.9)
POIKILOCYTOSIS BLD QL SMEAR: SLIGHT
POLYCHROMASIA BLD QL SMEAR: ABNORMAL
POTASSIUM SERPL-SCNC: 4.2 MMOL/L (ref 3.5–5.1)
PROT SERPL-MCNC: 5.7 G/DL (ref 6–8.4)
RBC # BLD AUTO: 2.83 M/UL (ref 4.6–6.2)
SODIUM SERPL-SCNC: 143 MMOL/L (ref 136–145)
WBC # BLD AUTO: 0.8 K/UL (ref 3.9–12.7)

## 2024-09-16 PROCEDURE — 20600001 HC STEP DOWN PRIVATE ROOM

## 2024-09-16 PROCEDURE — 99232 SBSQ HOSP IP/OBS MODERATE 35: CPT | Mod: GC,,, | Performed by: INTERNAL MEDICINE

## 2024-09-16 PROCEDURE — 25000003 PHARM REV CODE 250: Performed by: NURSE PRACTITIONER

## 2024-09-16 PROCEDURE — 63600175 PHARM REV CODE 636 W HCPCS: Performed by: INTERNAL MEDICINE

## 2024-09-16 PROCEDURE — 85025 COMPLETE CBC W/AUTO DIFF WBC: CPT | Performed by: NURSE PRACTITIONER

## 2024-09-16 PROCEDURE — 83735 ASSAY OF MAGNESIUM: CPT | Performed by: NURSE PRACTITIONER

## 2024-09-16 PROCEDURE — 80053 COMPREHEN METABOLIC PANEL: CPT | Performed by: NURSE PRACTITIONER

## 2024-09-16 PROCEDURE — 84100 ASSAY OF PHOSPHORUS: CPT | Performed by: NURSE PRACTITIONER

## 2024-09-16 PROCEDURE — 25000003 PHARM REV CODE 250: Performed by: INTERNAL MEDICINE

## 2024-09-16 RX ADMIN — URSODIOL 300 MG: 300 CAPSULE ORAL at 09:09

## 2024-09-16 RX ADMIN — VANCOMYCIN HYDROCHLORIDE 125 MG: KIT at 10:09

## 2024-09-16 RX ADMIN — GABAPENTIN 600 MG: 300 CAPSULE ORAL at 08:09

## 2024-09-16 RX ADMIN — Medication 1 DOSE: at 12:09

## 2024-09-16 RX ADMIN — ONDANSETRON 8 MG: 8 TABLET, ORALLY DISINTEGRATING ORAL at 12:09

## 2024-09-16 RX ADMIN — PANTOPRAZOLE SODIUM 40 MG: 40 TABLET, DELAYED RELEASE ORAL at 10:09

## 2024-09-16 RX ADMIN — SODIUM CHLORIDE AND POTASSIUM CHLORIDE 150 ML/HR: .9; .15 SOLUTION INTRAVENOUS at 04:09

## 2024-09-16 RX ADMIN — URSODIOL 300 MG: 300 CAPSULE ORAL at 08:09

## 2024-09-16 RX ADMIN — FLUDARABINE PHOSPHATE 75 MG: 25 INJECTION, SOLUTION INTRAVENOUS at 09:09

## 2024-09-16 RX ADMIN — ACYCLOVIR 800 MG: 200 CAPSULE ORAL at 08:09

## 2024-09-16 RX ADMIN — GABAPENTIN 600 MG: 300 CAPSULE ORAL at 09:09

## 2024-09-16 RX ADMIN — ONDANSETRON 8 MG: 8 TABLET, ORALLY DISINTEGRATING ORAL at 08:09

## 2024-09-16 RX ADMIN — SODIUM CHLORIDE AND POTASSIUM CHLORIDE 150 ML/HR: .9; .15 SOLUTION INTRAVENOUS at 07:09

## 2024-09-16 RX ADMIN — Medication 1 DOSE: at 08:09

## 2024-09-16 RX ADMIN — SODIUM CHLORIDE AND POTASSIUM CHLORIDE 150 ML/HR: .9; .15 SOLUTION INTRAVENOUS at 12:09

## 2024-09-16 RX ADMIN — OLANZAPINE 5 MG: 2.5 TABLET, FILM COATED ORAL at 09:09

## 2024-09-16 RX ADMIN — Medication 1 DOSE: at 05:09

## 2024-09-16 RX ADMIN — VANCOMYCIN HYDROCHLORIDE 125 MG: KIT at 08:09

## 2024-09-16 RX ADMIN — OLANZAPINE 5 MG: 2.5 TABLET, FILM COATED ORAL at 08:09

## 2024-09-16 RX ADMIN — ACYCLOVIR 800 MG: 200 CAPSULE ORAL at 09:09

## 2024-09-16 RX ADMIN — Medication 1 DOSE: at 09:09

## 2024-09-16 RX ADMIN — ONDANSETRON 8 MG: 8 TABLET, ORALLY DISINTEGRATING ORAL at 04:09

## 2024-09-16 NOTE — PROGRESS NOTES
Admit Assessment    Patient Identification  Guillaume Salinas   :  1955  Admit Date:  2024  Attending Provider:  Mohit Hickey MD              Referral:   Pt was admitted to the BMT UNIT with a diagnosis of Myelodysplastic syndrome, and was admitted this hospital stay due to Myelodysplastic syndrome, unspecified [D46.9]  MDS (myelodysplastic syndrome) [D46.9].      The  is involved by a routine referral to the Social Work Department. The patient presents as a 69 y.o. year old  male.    Persons interviewed: The patient with his spouse, Yennifer with the patient's permission.    Living Situation:    The patient lives with his wife at [address] [place] [address] [place] [address] [place] [address],     The patient's phone: [phone].    The patient's spouse, Yennifer Thomas phone: [phone].  The patient's brother-in-law, Jose Alberto Thomas: [phone].    (RETIRED) Functional Status Prior  Ambulation Prior: 0-->independent  Transferrin-->independent  Toiletin-->independent  Bathin-->independent  Dressin-->independent  Eatin-->independent  Communication: understands/communicates without difficulty  Swallowing: swallows foods/liquids without difficulty    Current or Past Agencies and Description of Services/Supplies    DME  Agency Name: None.  Agency Phone Number: None.  Equipment Currently Used at Home: None.    Home Health  Agency Name: None.  Agency Phone Number: None.  Services: None.    IV Infusion  Agency Name: None.  Agency Phone Number: None.    Nutrition: Oral diet without restrictions.    Outpatient Pharmacy:     [id number] - [place]  [address] AT [facility]  [address] [place] [address]  Phone: [phone] Fax: [phone]    [facility] - [place], [place] - [address]  [address]  [place] [address]  Phone: [phone] Fax: [phone]      Patient Preference  of agencies include: [facility].    Patient/Caregiver informed of right to choose providers or agencies.  Patient provides permission to release any necessary information to [facility] and to Non-[facility] agencies as needed to facilitate patient care, treatment planning, and patient discharge planning.  Written and verbal resources provided. Yes.      Coping: the patient stated that he is coping fine. The patient stated that his coping mechanism is being around his spouse and two grand children.     Adjustment to Diagnosis and Treatment:  The patient stated that he is adjusting well to his diagnosis and treatment.    Emotional: The patient reported no emotional issues or concerns.    Behavioral: The patient reported no behavioral issues or concerns.    Cognitive Issues: The patient reported no issues or concerns cognitively.    Employment: Retired    Finance: Social Security    Housing:The patient lives with his spouse in a single family home.    Recreation/Hobbies/Leisure: Painting pictures, watching sports, gardening and being with his family.    [place]: Christian.    Insurance: [employer].    POA: None.    Living Will: Yes.    Advance Directives: No    Transportation: Transportation will be provided by family.      History/Current Symptoms of Anxiety/Depression: No:   History/Current Substance Use:   Social History     Tobacco Use    Smoking status: Former     Current packs/day: 0.00     Average packs/day: 0.3 packs/day for 50.0 years (12.5 ttl pk-yrs)     Types: Cigarettes     Start date: 3/1/1973     Quit date: 3/1/2023     Years since quittin.5     Passive exposure: Past    Smokeless tobacco: Never   Substance and Sexual Activity    Alcohol use: Not Currently    Drug use: Never    Sexual activity: Not Currently     Partners: Female       Indications of Abuse/Neglect: No:   Abuse Screen (yes response referral indicated)  Feels Unsafe at Home or Work/School: No.  Physical Signs of Abuse  Present: No.    Financial:  Payer/Plan Subscr  Sex Relation Sub. Ins. ID Effective Group Num   1. SIMON HERRERA CARD 1955 Male Self [id number]                                   [address] [address]      Other identified concerns/needs: The patient did not express any needs at this time.    Plan: TBD    Interventions/Referrals: TBD  Patient/caregiver engaged in treatment planning process. Yes.     providing psychosocial and supportive counseling, resources, education, assistance and discharge planning as appropriate.  Patient/caregiver state understanding of  available resources,  following, remains available. Yes.

## 2024-09-16 NOTE — PROGRESS NOTES
Section of Hematology and Stem Cell Transplantation  Follow Up Visit     Date of visit: 9/12/24  Visit diagnosis: Myelodysplastic syndrome [D46.9]  Referred by:  Harry Diamond MD    Oncologic History:     Primary Oncologic Diagnosis: Myelodysplastic syndrome excess blasts 2, IPSS-M very high risk    4/12/23: Initial evaluation by Dr. Diamond of anemia. WBC 2.71, Hgb 7.1, Plts  400. Prior to this visit, he was in [place] for a dental procedure. His symptoms of fatigue started after extractions. Workup by Dr. Diamond unremarkable.   4/26/23: Bone marrow biopsy revealed a hypercellular marrow (80-90%) with increased blasts (8-12%) concerning for MDS EB2. However, sample was limited.   5/23/23: Repeat bone marrow biopsy revealed myelodysplastic syndrome with excess blasts 2 (blasts 16-17%). 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 NGS with ASXL1 (44%), EZH2 (87%), IDH2 (42%), STAG2 (89%), U2AF1 (3%).  6/12/23: Cycle 1 of azacitidine 75 mg/m2 plus venetoclax x14 of 28 days.   7/3/23: Bone marrow biopsy at the end of cycle 1 revealed a hypocellular marrow, no blasts, trilineage hypoplasia and dyspoiesis with increased micromegakaryocytes. FISH with trisomy 8 (three copies of [id number]). NGS with ASXL1 (20%), EZH2 (40%), IDH2 (17%), STAG2 (38%).  9/6/23: Bone marrow biopsy revealed dysplastic changes but blasts were not increased. Diploid karyotype. NGS with ASXL1 (16%).  4/23/24: Bone marrow biopsy revealed a cellular marrow with megakaryocytic hypoplasia with micromegakaryocytes. 5% blasts by flow. FISH normal. NGS with ASXL1 (25%), EZH2 (45%), IDH2 (19%), RUNX1 (19%), STAG2 (37%).  6/18/24: Bone marrow biopsy with persistent MDS without increased blasts.   8/20/24: Pre-transplant bone marrow biopsy revealed persistent MDS. FISH normal. NGS with ASXL1 (7%), EZH2 (5%).    History of Present Ilness:   Guillaume Amezcuaona (Guillaume) is a pleasant 69 y.o.male with PVD, CAD, HTN who presents for follow up. He feels well. He is  ready for transplant.       PAST MEDICAL HISTORY:   Past Medical History:   Diagnosis Date    Anticoagulant long-term use     Coronary artery disease     Hypertension     Myelodysplastic syndrome     Peripheral vascular disease, unspecified        PAST SURGICAL HISTORY:   Past Surgical History:   Procedure Laterality Date    BONE MARROW BIOPSY Left 2023    Procedure: Biopsy-bone marrow;  Surgeon: Harry Diamond MD;  Location: [facility] OR;  Service: Oncology;  Laterality: Left;    COLONOSCOPY N/A 2022    Procedure: COLONOSCOPY Golytely Vaccinated will bring cards;  Surgeon: Dereje Simon MD;  Location: [facility] ENDO;  Service: Endoscopy;  Laterality: N/A;  Do not cancel this order    INSERTION OF LEMONS CATHETER Right 2024    Procedure: INSERTION, CATHETER, CENTRAL VENOUS, LEMONS TRIPLE LUMEN;  Surgeon: Kg Patten MD;  Location: [address];  Service: General;  Laterality: Right;       PAST SOCIAL HISTORY:  Social History     Tobacco Use    Smoking status: Former     Current packs/day: 0.00     Average packs/day: 0.3 packs/day for 50.0 years (12.5 ttl pk-yrs)     Types: Cigarettes     Start date: 3/1/1973     Quit date: 3/1/2023     Years since quittin.5     Passive exposure: Past    Smokeless tobacco: Never   Substance Use Topics    Alcohol use: Not Currently    Drug use: Never       FAMILY HISTORY:  Family History   Problem Relation Name Age of Onset    Hypertension Mother      Cancer Father      Cancer Brother 9     No Known Problems Daughter      No Known Problems Daughter      No Known Problems Son         CURRENT MEDICATIONS:   No current facility-administered medications for this visit.     No current outpatient medications on file.     Facility-Administered Medications Ordered in Other Visits   Medication    0.9 % NaCl with KCl 20 mEq infusion    0.9%  NaCl infusion (for blood administration)    0.9%  NaCl infusion (for blood administration)    0.9%  NaCl infusion (for  blood administration)    0.9%  NaCl infusion (for blood administration)    0.9%  NaCl infusion (for blood administration)    acyclovir capsule 800 mg    alteplase injection 2 mg    alteplase injection 2 mg    [START ON 9/22/2024] aprepitant (CINVANTI) injection 130 mg    artificial tears 0.5 % ophthalmic solution 2 drop    [START ON 9/22/2024] cycloPHOSphamide 3,500 mg in 0.9% NaCl 582.5 mL chemo infusion    [START ON 9/17/2024] dexAMETHasone tablet 12 mg    diphenhydrAMINE capsule 50 mg    diphenhydrAMINE injection 25 mg    EPINEPHrine (PF) injection 0.3 mg    [START ON 9/24/2024] filgrastim (NEUPOGEN) injection 300 mcg/mL    fludarabine (FLUDARA) 75 mg in 0.9% NaCl 118 mL chemo infusion    gabapentin capsule 600 mg    heparin, porcine (PF) 100 unit/mL injection flush 300 Units    k phos di & mono-sod phos mono 250 mg tablet 1 tablet    k phos di & mono-sod phos mono 250 mg tablet 2 tablet    [START ON 9/24/2024] letermovir Tab 480 mg    [START ON 9/18/2024] levoFLOXacin tablet 500 mg    LORazepam injection 1 mg    [START ON 9/22/2024] LORazepam tablet 1 mg    magnesium oxide tablet 400 mg    magnesium oxide tablet 400 mg    magnesium oxide tablet 800 mg    [START ON 9/17/2024] melphalan Hcl-betadex sbes (EVOMELA) 190 mg in 0.9% NaCl 500 mL chemo infusion    [START ON 9/22/2024] mesna (MESNEX) 707 mg in 0.9% NaCl 70.07 mL infusion    [START ON 9/22/2024] mesna (MESNEX) 707 mg in 0.9% NaCl 70.07 mL infusion    [START ON 9/22/2024] mesna (MESNEX) 707 mg in 0.9% NaCl 70.07 mL infusion    [START ON 9/22/2024] mesna (MESNEX) 707 mg in 0.9% NaCl 70.07 mL infusion    methylPREDNISolone sodium succinate injection 125 mg    [START ON 9/18/2024] micafungin 100 mg in 0.9% NaCl 100 mL IVPB (MB+)    [START ON 9/24/2024] mycophenolate capsule 1,000 mg    OLANZapine tablet 5 mg    ondansetron disintegrating tablet 8 mg    [START ON 9/22/2024] ondansetron injection 8 mg    pantoprazole EC tablet 40 mg    [START ON 9/24/2024]  posaconazole EC tablet 300 mg    [START ON 9/26/2024] posaconazole EC tablet 300 mg    potassium chloride SA CR tablet 20 mEq    potassium chloride SA CR tablet 20 mEq    potassium chloride SA CR tablet 20 mEq    prochlorperazine injection Soln 10 mg    sodium bicarb-sodium chloride powder 1 Dose    [START ON 9/22/2024] sodium chloride 0.9% bolus 500 mL 500 mL    [START ON 9/22/2024] sodium chloride 0.9% bolus 500 mL 500 mL    sodium chloride 0.9% flush 10 mL    [START ON 10/19/2024] sulfamethoxazole-trimethoprim 800-160mg per tablet 1 tablet    [START ON 9/24/2024] tacrolimus capsule 1 mg    [START ON 9/24/2024] tacrolimus capsule 1 mg    traZODone tablet 50 mg    ursodioL capsule 300 mg    vancomycin 125 mg/5 mL oral solution 125 mg       ALLERGIES:   Review of patient's allergies indicates:  No Known Allergies    Review of Systems:     Pertinent positives and negatives included in the HPI. Otherwise a complete review of systems is negative.    Physical Exam:     Vitals:    09/12/24 1256   BP: (!) 108/53   Pulse: 76   Temp: 98.4 °F (36.9 °C)       General: Appears well, NAD  Pulmonary: CTAB, no increased work of breathing, no W/R/C  Cardiovascular: S1S2 normal, RRR, no M/R/G  Abdominal: Soft, NT, ND, BS+, no HSM  Extremities: No C/C/E  Neurological: AAOx4, grossly normal, no focal deficits  Dermatologic: No appreciable rashes or lesions     ECOG Performance Status: (foot note - ECOG PS provided by Eastern Cooperative Oncology Group) 1 - Symptomatic but completely ambulatory    Karnofsky Performance Score:  80%- Normal Activity with Effort: Some Symptoms of Disease    Labs:   Lab Results   Component Value Date    WBC 1.30 (LL) 09/15/2024    RBC 2.30 (L) 09/15/2024    HGB 6.9 (L) 09/15/2024    HCT 21.1 (L) 09/15/2024    MCV 92 09/15/2024    MCH 30.0 09/15/2024    MCHC 32.7 09/15/2024    RDW 15.1 (H) 09/15/2024    PLT 51 (L) 09/15/2024    MPV 11.6 09/15/2024    GRAN 0.6 (L) 09/15/2024    GRAN 43.8 09/15/2024    LYMPH  0.6 (L) 09/15/2024    LYMPH 48.5 (H) 09/15/2024    MONO 0.1 (L) 09/15/2024    MONO 6.2 09/15/2024    EOS 0.0 09/15/2024    BASO 0.00 09/15/2024    EOSINOPHIL 1.5 09/15/2024    BASOPHIL 0.0 09/15/2024       CMP  Sodium   Date Value Ref Range Status   09/15/2024 139 136 - 145 mmol/L Final     Potassium   Date Value Ref Range Status   09/15/2024 4.0 3.5 - 5.1 mmol/L Final     Chloride   Date Value Ref Range Status   09/15/2024 108 95 - 110 mmol/L Final     CO2   Date Value Ref Range Status   09/15/2024 26 23 - 29 mmol/L Final     Glucose   Date Value Ref Range Status   09/15/2024 97 70 - 110 mg/dL Final     BUN   Date Value Ref Range Status   09/15/2024 9 8 - 23 mg/dL Final     Creatinine   Date Value Ref Range Status   09/15/2024 0.8 0.5 - 1.4 mg/dL Final     Calcium   Date Value Ref Range Status   09/15/2024 8.5 (L) 8.7 - 10.5 mg/dL Final     Total Protein   Date Value Ref Range Status   09/15/2024 5.3 (L) 6.0 - 8.4 g/dL Final     Albumin   Date Value Ref Range Status   09/15/2024 3.0 (L) 3.5 - 5.2 g/dL Final     Total Bilirubin   Date Value Ref Range Status   09/15/2024 0.5 0.1 - 1.0 mg/dL Final     Comment:     For infants and newborns, interpretation of results should be based  on gestational age, weight and in agreement with clinical  observations.    Premature Infant recommended reference ranges:  Up to 24 hours.............<8.0 mg/dL  Up to 48 hours............<12.0 mg/dL  3-5 days..................<15.0 mg/dL  6-29 days.................<15.0 mg/dL       Alkaline Phosphatase   Date Value Ref Range Status   09/15/2024 72 55 - 135 U/L Final     AST   Date Value Ref Range Status   09/15/2024 17 10 - 40 U/L Final     ALT   Date Value Ref Range Status   09/15/2024 22 10 - 44 U/L Final     Anion Gap   Date Value Ref Range Status   09/15/2024 5 (L) 8 - 16 mmol/L Final     eGFR if    Date Value Ref Range Status   08/27/2021 >60 >60 mL/min/1.73 m^2 Final   08/27/2021 >60 >60 mL/min/1.73 m^2 Final    08/27/2021 >60 >60 mL/min/1.73 m^2 Final     eGFR if non    Date Value Ref Range Status   08/27/2021 57 (A) >60 mL/min/1.73 m^2 Final     Comment:     Calculation used to obtain the estimated glomerular filtration  rate (eGFR) is the CKD-EPI equation.      08/27/2021 57 (A) >60 mL/min/1.73 m^2 Final     Comment:     Calculation used to obtain the estimated glomerular filtration  rate (eGFR) is the CKD-EPI equation.      08/27/2021 57 (A) >60 mL/min/1.73 m^2 Final     Comment:     Calculation used to obtain the estimated glomerular filtration  rate (eGFR) is the CKD-EPI equation.          Imaging:   No results found for this or any previous visit (from the past 2160 hour(s)).    Pathology:  Reviewed     Assessment and Plan:   Guillaume Salinas (Guillaume) is a pleasant 69 y.o.male with PVD, CAD, HTN who presents for follow up.    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 NGS with ASXL1 (44%), EZH2 (87%), IDH2 (42%), STAG2 (89%), U2AF1 (3%). He started treatment with azacitidine 75 mg/m2 daily x7 days plus venetoclax 100mg (voriconazole) daily x14 of 28 days. Venetoclax decreased to 7 days with cycle 3. Bone marrow biopsy 9/6/23 revealed dysplastic changes but blasts were not increased. Diploid karyotype. NGS with ASXL1 (16%). Repeat bone marrow biopsy on 4/23/24 noted persistent MDS without increased blasts. NGS with ASXL1 (25%), EZH2 (45%), IDH2 (19%), RUNX1 (19%), STAG2 (37%). Bone marrow biopsy 6/18/24 showed persistent MDS without blasts. Pre-transplant bone marrow biopsy on 8/20/24 revealed persistent MDS without increased blasts; NGS with ASXL1 (7%), EZH2 (5%).    Stem cell transplant candidate: We discussed the role for allogeneic stem cell transplantation in this disease process as a potentially curative option. We had an extensive discussion about the rationale, logistics, risks, and benefits. We reviewed the  requirement to stay in the [place] area for 100 days with a caregiver at all times. We discussed the risks, including infection, graft failure, organ toxicity, graft versus host disease, relapse of disease, and secondary cancers. We reviewed the need for long-term immunosuppression and need for close monitoring. HCT-CI of 5, high risk (41% 2 year NRM, 34% 2 year OS).   Regimen:  + 2Gy TBI  GVHD ppx: PTCy, Tac, MMF  Donor: son (haplo)   Graft source: BM  Transplant date: 9/19/24  Maintenance: none planned     Deconditioning: He has been working with PT and doing favorably    Pancytopenia: Related to MDS. Monitor twice weekly. Transfuse for Hgb <7 or Plts <10.    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     Peripheral vascular disease: He stopped cilostazol. Continue follow up with cardiology.     7.    Insomnia: Sent for Trazodone, instructed to gradually wean off Ambien    Orders/Follow Up:      Orders Placed This Encounter    Uric Acid    Lactate Dehydrogenase    Phosphorus    Magnesium    Comprehensive Metabolic Panel    CBC Auto Differential    CMV DNA, Quantitative, PCR    ALEX-BARR VIRUS DNA, QUANTITATIVE (PCR)    TACROLIMUS LEVEL    Type & Screen           Route Chart for Scheduling    BMT Chart Routing      Follow up with physician Twice a week. Twice weekly AM visits (Mon-Thurs) 10/14 - 12/28   Follow up with [facility]    Provider visit type    Infusion scheduling note    Injection scheduling note Twice weekly AM port labs (Mon-Thurs) 10/14 - 12/28   Labs CBC, CMP, EBV, CMV, type and screen, phosphorus, tacrolimus level and magnesium   Scheduling:  Preferred lab:  Lab interval: twice a week  Twice weekly AM port labs (Mon-Thurs) 10/14 - 12/28   Imaging    Pharmacy appointment    Other referrals                Treatment Plan Information   BMT JEAN Haploidentical FluMelTBI (fludarabine melphalan total body irradiation) with post-transplant cycloPHOSphamide   Mohit Hickey MD   Associated diagnosis: Myelodysplastic syndrome   noted on 5/12/2023   Line of treatment: BMT Conditioning  Treatment Goal: Curative     Upcoming Treatment Dates - BMT JEAN Haploidentical FluMelTBI (fludarabine melphalan total body irradiation) with post-transplant cycloPHOSphamide     No upcoming days in selected categories.    Therapy Plan Information  VITAMIN B12 (CYANOCOBALAMIN) IM for B12 deficiency, noted on 2/2/2024  cyanocobalamin injection 1,000 mcg  1,000 mcg, Intramuscular, Every visit      No therapy plan of the specified type found.    No therapy plan of the specified type found.      Total time of this visit was 40 minutes, including time spent face to face with patient, and also in preparing for today's visit for MDM and documentation. (Medical Decision Making, including consideration of possible diagnoses, management options, complex medical record review, review of diagnostic tests and information, consideration and discussion of significant complications based on comorbidities, and discussion with providers involved with the care of the patient). Greater than 50% was spent face to face with the patient counseling and coordinating care.  Visit today included increased complexity associated with the care of the episodic problem deconditioning and Cdiff addressed and managing the longitudinal care of the patient due to the serious and/or complex managed problem(s) myelodysplastic syndrome, stem cell transplant candidate, pancytopenia, immunodeficiency.    Paco Hickey MD  Malignant Hematology, Stem Cell Transplant, and Cellular Therapy  [facility]

## 2024-09-16 NOTE — PROGRESS NOTES
[facility] - Oncology ([facility])  Hematology  Bone Marrow Transplant  Progress Note    Patient Name: Guillaume Salinas  Admission Date: 9/13/2024  Hospital Length of Stay: 3 days  Code Status: Full Code    Subjective:     Interval History: Day -3 Flu/shilpi 100 conditioning for HaploSCT. Patient feels well, offers no new complaints.     Objective:     Vital Signs (Most Recent):  Temp: 98.7 °F (37.1 °C) (09/16/24 1539)  Pulse: 74 (09/16/24 1539)  Resp: 18 (09/16/24 1539)  BP: 117/71 (09/16/24 1539)  SpO2: 97 % (09/16/24 1539) Vital Signs (24h Range):  Temp:  [97.8 °F (36.6 °C)-98.7 °F (37.1 °C)] 98.7 °F (37.1 °C)  Pulse:  [64-76] 74  Resp:  [18] 18  SpO2:  [92 %-98 %] 97 %  BP: (117-148)/(64-77) 117/71     Weight: 76 kg (167 lb 8.8 oz)  Body mass index is 24.74 kg/m².  Body surface area is 1.92 meters squared.    ECOG SCORE           [unfilled]    Intake/Output - Last 3 Shifts         09/14 0700  09/15 0659 09/15 0700 09/16 0659 09/16 0700  09/17 0659    P.O. 978 901 750    Blood 350 700     Total Intake(mL/kg) 1328 (17.4) 1601 (21.1) 750 (9.9)    Urine (mL/kg/hr) 750 (0.4) 1625 (0.9) 1000 (1.5)    Stool 0 0 0    Total Output 750 1625 1000    Net +578 -24 -250           Urine Occurrence 7 x 10 x     Stool Occurrence 0 x 1 x 0 x             Physical Exam  Constitutional:       General: He is not in acute distress.     Appearance: Normal appearance. He is not ill-appearing.   HENT:      Head: Normocephalic and atraumatic.      Nose: No congestion or rhinorrhea.   Eyes:      Extraocular Movements: Extraocular movements intact.   Cardiovascular:      Rate and Rhythm: Normal rate and regular rhythm.   Pulmonary:      Effort: Pulmonary effort is normal.   Abdominal:      General: Abdomen is flat. There is no distension.      Tenderness: There is no abdominal tenderness.   Musculoskeletal:      Cervical back: Normal range of motion. No rigidity.      Right lower leg: No edema.      Left lower leg: No edema.    Neurological:      General: No focal deficit present.      Mental Status: He is alert and oriented to person, place, and time.            Significant Labs:   All pertinent labs from the last 24 hours have been reviewed.    Diagnostic Results:  I have reviewed all pertinent imaging results/findings within the past 24 hours.  Assessment/Plan:     * Myelodysplastic syndrome  From Dr Hickey clinic note 8/5:  Stem cell transplant candidate: We discussed the role for allogeneic stem cell transplantation in this disease process as a potentially curative option. We had an extensive discussion about the rationale, logistics, risks, and benefits. We reviewed the requirement to stay in the [place] area for 100 days with a caregiver at all times. We discussed the risks, including infection, graft failure, organ toxicity, graft versus host disease, relapse of disease, and secondary cancers. We reviewed the need for long-term immunosuppression and need for close monitoring. HCT-CI of 1 (intermediate risk). We had a prolonged discussion today regarding his recent deconditioning, Cdiff, and underlying disease. He is now much improved since last seen, and is improving his strength since starting to work with PT. Diarrhea now controlled. We discussed that our plan to move forward with the transplant process.   Coordinator: Fay Stephens  Regimen:  + 2Gy TBI  Donor: son (haplo)   Graft source: BM    Pancytopenia  -CBC daily  -transfuse for hgb <7 Plt <10 (<50 with bleeding)    Hypertension, essential  -holding coreg for fluid responsive hypotension    Coronary artery disease involving native coronary artery of native heart without angina pectoris  -holding coreg for hypotension  -resume as tolerated/indicated        VTE Risk Mitigation (From admission, onward)           Ordered     heparin, porcine (PF) 100 unit/mL injection flush 300 Units  As needed (PRN)         09/13/24 1630                    Disposition:  TBRIGOBERTO Reese MD  Bone Marrow Transplant  [facility] - Oncology ([facility])

## 2024-09-16 NOTE — PROGRESS NOTES
Physical Therapy      Patient Name:  Guillaume Salinas   MRN:  [id number]  Admitting Diagnosis:  Myelodysplastic syndrome   Recent Surgery: * No surgery found *    Admit Date: 9/13/2024  Length of Stay: 3 days    Patient not seen today due to MD team present upon morning attempt, politely deferred eval in afternoon - reporting having already completed recumbent biking and hallway ambulation this date. Will follow-up for progressive mobility pending continued medical stability and patient participation.    9/16/2024

## 2024-09-16 NOTE — PLAN OF CARE
Day -3 Flu/Rosalee SCT. No acute events occurred during the shift. No PRN medication given. No complains of pain or nausea. NaCl w/ KCL 20 mEq @ 150 mL/hr. Pt ambulates in room and to bathroom independently. No difficulty voiding. VS have been stable throughout shift and pt is afebrile. Pt AOx4. POC reviewed with patient and his wife. Both verbalized an understanding. Questions encouraged and answered. Bed is in the lowest position and locked. Non skid socks worn. Call light within reach. Pt encouraged to call for assistance when needed.

## 2024-09-16 NOTE — SUBJECTIVE & OBJECTIVE
Subjective:     Interval History: Day -3 Flu/shilpi 100 conditioning for HaploSCT. Patient feels well, offers no new complaints.     Objective:     Vital Signs (Most Recent):  Temp: 98.7 °F (37.1 °C) (09/16/24 1539)  Pulse: 74 (09/16/24 1539)  Resp: 18 (09/16/24 1539)  BP: 117/71 (09/16/24 1539)  SpO2: 97 % (09/16/24 1539) Vital Signs (24h Range):  Temp:  [97.8 °F (36.6 °C)-98.7 °F (37.1 °C)] 98.7 °F (37.1 °C)  Pulse:  [64-76] 74  Resp:  [18] 18  SpO2:  [92 %-98 %] 97 %  BP: (117-148)/(64-77) 117/71     Weight: 76 kg (167 lb 8.8 oz)  Body mass index is 24.74 kg/m².  Body surface area is 1.92 meters squared.    ECOG SCORE           [unfilled]    Intake/Output - Last 3 Shifts         09/14 0700  09/15 0659 09/15 0700  09/16 0659 09/16 0700  09/17 0659    P.O. 978 901 750    Blood 350 700     Total Intake(mL/kg) 1328 (17.4) 1601 (21.1) 750 (9.9)    Urine (mL/kg/hr) 750 (0.4) 1625 (0.9) 1000 (1.5)    Stool 0 0 0    Total Output 750 1625 1000    Net +578 -24 -250           Urine Occurrence 7 x 10 x     Stool Occurrence 0 x 1 x 0 x             Physical Exam  Constitutional:       General: He is not in acute distress.     Appearance: Normal appearance. He is not ill-appearing.   HENT:      Head: Normocephalic and atraumatic.      Nose: No congestion or rhinorrhea.   Eyes:      Extraocular Movements: Extraocular movements intact.   Cardiovascular:      Rate and Rhythm: Normal rate and regular rhythm.   Pulmonary:      Effort: Pulmonary effort is normal.   Abdominal:      General: Abdomen is flat. There is no distension.      Tenderness: There is no abdominal tenderness.   Musculoskeletal:      Cervical back: Normal range of motion. No rigidity.      Right lower leg: No edema.      Left lower leg: No edema.   Neurological:      General: No focal deficit present.      Mental Status: He is alert and oriented to person, place, and time.            Significant Labs:   All pertinent labs from the last 24 hours have been  reviewed.    Diagnostic Results:  I have reviewed all pertinent imaging results/findings within the past 24 hours.

## 2024-09-16 NOTE — PLAN OF CARE
Day -3 Flu/Rosalee haplo SCT; no acute events this shift. Afebrile & VSS on room air. No PRNs needed. Chemotherapy continues as scheduled. Pt received fludarabine this shift through DMITRY vaughn with positive blood return. Chemotherapy consent in chart and infusion verified by two chemo certified RN's. Electrolytes closely monitored. Ambulates independently to bathroom; educated on importance of I/O recording. CHG wipes provided and encouraged use. Tolerating diet, voiding without difficulty.  Pt educated on importance of the use of a bed alarm. Pt agreeable to using the bed alarm at the beginning of the shift, however refused it towards the end. Pt and wife at bedside re-educated but still refusing. Pt remaining free from falls or injury throughout shift; bed locked and in lowest position; call light within reach. POC reviewed with patient and family at bedside. All needs addressed. Frequent rounding performed.

## 2024-09-17 PROBLEM — G47.00 INSOMNIA: Status: ACTIVE | Noted: 2024-09-17

## 2024-09-17 PROBLEM — G62.9 NEUROPATHY: Status: ACTIVE | Noted: 2024-09-17

## 2024-09-17 PROBLEM — Z86.19 HISTORY OF CLOSTRIDIOIDES DIFFICILE INFECTION: Status: ACTIVE | Noted: 2024-09-17

## 2024-09-17 LAB
ABO + RH BLD: NORMAL
ALBUMIN SERPL BCP-MCNC: 2.9 G/DL (ref 3.5–5.2)
ALP SERPL-CCNC: 64 U/L (ref 55–135)
ALT SERPL W/O P-5'-P-CCNC: 18 U/L (ref 10–44)
ANION GAP SERPL CALC-SCNC: 6 MMOL/L (ref 8–16)
ANISOCYTOSIS BLD QL SMEAR: SLIGHT
AST SERPL-CCNC: 15 U/L (ref 10–40)
BASOPHILS # BLD AUTO: 0 K/UL (ref 0–0.2)
BASOPHILS NFR BLD: 0 % (ref 0–1.9)
BILIRUB SERPL-MCNC: 0.6 MG/DL (ref 0.1–1)
BLD GP AB SCN CELLS X3 SERPL QL: NORMAL
BUN SERPL-MCNC: 7 MG/DL (ref 8–23)
CALCIUM SERPL-MCNC: 8.5 MG/DL (ref 8.7–10.5)
CHLORIDE SERPL-SCNC: 108 MMOL/L (ref 95–110)
CO2 SERPL-SCNC: 27 MMOL/L (ref 23–29)
CREAT SERPL-MCNC: 0.8 MG/DL (ref 0.5–1.4)
DACRYOCYTES BLD QL SMEAR: ABNORMAL
DIFFERENTIAL METHOD BLD: ABNORMAL
EOSINOPHIL # BLD AUTO: 0 K/UL (ref 0–0.5)
EOSINOPHIL NFR BLD: 2.1 % (ref 0–8)
ERYTHROCYTE [DISTWIDTH] IN BLOOD BY AUTOMATED COUNT: 14.3 % (ref 11.5–14.5)
EST. GFR  (NO RACE VARIABLE): >60 ML/MIN/1.73 M^2
GLUCOSE SERPL-MCNC: 98 MG/DL (ref 70–110)
HCT VFR BLD AUTO: 22.4 % (ref 40–54)
HGB BLD-MCNC: 7.9 G/DL (ref 14–18)
HYPOCHROMIA BLD QL SMEAR: ABNORMAL
IMM GRANULOCYTES # BLD AUTO: 0 K/UL (ref 0–0.04)
IMM GRANULOCYTES NFR BLD AUTO: 0 % (ref 0–0.5)
LYMPHOCYTES # BLD AUTO: 0.1 K/UL (ref 1–4.8)
LYMPHOCYTES NFR BLD: 18.8 % (ref 18–48)
MAGNESIUM SERPL-MCNC: 1.8 MG/DL (ref 1.6–2.6)
MCH RBC QN AUTO: 32 PG (ref 27–31)
MCHC RBC AUTO-ENTMCNC: 35.3 G/DL (ref 32–36)
MCV RBC AUTO: 91 FL (ref 82–98)
MONOCYTES # BLD AUTO: 0 K/UL (ref 0.3–1)
MONOCYTES NFR BLD: 2.1 % (ref 4–15)
NEUTROPHILS # BLD AUTO: 0.4 K/UL (ref 1.8–7.7)
NEUTROPHILS NFR BLD: 77 % (ref 38–73)
NRBC BLD-RTO: 0 /100 WBC
OVALOCYTES BLD QL SMEAR: ABNORMAL
PHOSPHATE SERPL-MCNC: 4.2 MG/DL (ref 2.7–4.5)
PLATELET # BLD AUTO: 45 K/UL (ref 150–450)
PLATELET BLD QL SMEAR: ABNORMAL
PMV BLD AUTO: 11.9 FL (ref 9.2–12.9)
POIKILOCYTOSIS BLD QL SMEAR: SLIGHT
POLYCHROMASIA BLD QL SMEAR: ABNORMAL
POTASSIUM SERPL-SCNC: 4.3 MMOL/L (ref 3.5–5.1)
PROT SERPL-MCNC: 5.2 G/DL (ref 6–8.4)
RBC # BLD AUTO: 2.47 M/UL (ref 4.6–6.2)
SODIUM SERPL-SCNC: 141 MMOL/L (ref 136–145)
SPECIMEN OUTDATE: NORMAL
WBC # BLD AUTO: 0.48 K/UL (ref 3.9–12.7)

## 2024-09-17 PROCEDURE — 97162 PT EVAL MOD COMPLEX 30 MIN: CPT

## 2024-09-17 PROCEDURE — 85025 COMPLETE CBC W/AUTO DIFF WBC: CPT | Performed by: NURSE PRACTITIONER

## 2024-09-17 PROCEDURE — 99232 SBSQ HOSP IP/OBS MODERATE 35: CPT | Mod: ,,, | Performed by: INTERNAL MEDICINE

## 2024-09-17 PROCEDURE — 86901 BLOOD TYPING SEROLOGIC RH(D): CPT | Performed by: NURSE PRACTITIONER

## 2024-09-17 PROCEDURE — 83735 ASSAY OF MAGNESIUM: CPT | Performed by: NURSE PRACTITIONER

## 2024-09-17 PROCEDURE — 84100 ASSAY OF PHOSPHORUS: CPT | Performed by: NURSE PRACTITIONER

## 2024-09-17 PROCEDURE — 20600001 HC STEP DOWN PRIVATE ROOM

## 2024-09-17 PROCEDURE — 25000003 PHARM REV CODE 250: Performed by: NURSE PRACTITIONER

## 2024-09-17 PROCEDURE — 25000003 PHARM REV CODE 250: Performed by: INTERNAL MEDICINE

## 2024-09-17 PROCEDURE — 86900 BLOOD TYPING SEROLOGIC ABO: CPT | Performed by: NURSE PRACTITIONER

## 2024-09-17 PROCEDURE — 63600175 PHARM REV CODE 636 W HCPCS: Performed by: INTERNAL MEDICINE

## 2024-09-17 PROCEDURE — 80053 COMPREHEN METABOLIC PANEL: CPT | Performed by: NURSE PRACTITIONER

## 2024-09-17 PROCEDURE — 86850 RBC ANTIBODY SCREEN: CPT | Performed by: NURSE PRACTITIONER

## 2024-09-17 RX ADMIN — ONDANSETRON 8 MG: 8 TABLET, ORALLY DISINTEGRATING ORAL at 04:09

## 2024-09-17 RX ADMIN — ACYCLOVIR 800 MG: 200 CAPSULE ORAL at 08:09

## 2024-09-17 RX ADMIN — Medication 1 DOSE: at 08:09

## 2024-09-17 RX ADMIN — MELPHALAN 190 MG: 50 INJECTION, POWDER, LYOPHILIZED, FOR SOLUTION INTRAVENOUS at 09:09

## 2024-09-17 RX ADMIN — Medication 1 DOSE: at 05:09

## 2024-09-17 RX ADMIN — VANCOMYCIN HYDROCHLORIDE 125 MG: KIT at 08:09

## 2024-09-17 RX ADMIN — ONDANSETRON 8 MG: 8 TABLET, ORALLY DISINTEGRATING ORAL at 08:09

## 2024-09-17 RX ADMIN — URSODIOL 300 MG: 300 CAPSULE ORAL at 08:09

## 2024-09-17 RX ADMIN — GABAPENTIN 600 MG: 300 CAPSULE ORAL at 08:09

## 2024-09-17 RX ADMIN — PANTOPRAZOLE SODIUM 40 MG: 40 TABLET, DELAYED RELEASE ORAL at 08:09

## 2024-09-17 RX ADMIN — OLANZAPINE 5 MG: 2.5 TABLET, FILM COATED ORAL at 08:09

## 2024-09-17 RX ADMIN — Medication 400 MG: at 09:09

## 2024-09-17 RX ADMIN — ONDANSETRON 8 MG: 8 TABLET, ORALLY DISINTEGRATING ORAL at 12:09

## 2024-09-17 RX ADMIN — SODIUM CHLORIDE AND POTASSIUM CHLORIDE 150 ML/HR: .9; .15 SOLUTION INTRAVENOUS at 08:09

## 2024-09-17 RX ADMIN — Medication 400 MG: at 07:09

## 2024-09-17 RX ADMIN — FLUDARABINE PHOSPHATE 75 MG: 25 INJECTION, SOLUTION INTRAVENOUS at 09:09

## 2024-09-17 RX ADMIN — DEXAMETHASONE 12 MG: 4 TABLET ORAL at 08:09

## 2024-09-17 NOTE — PT/OT/SLP EVAL
Physical Therapy Evaluation    Patient Name:  Guillaume Salinas   MRN:  [id number]    Recommendations:     Discharge Recommendations: No Therapy Indicated   Discharge Equipment Recommendations: none   Barriers to discharge: None    Patient is safe to ambulate/transfer with nursing or family, encouraged to sit up in chair when able.      Assessment:     Guillaume Salinas is a 69 y.o. male admitted with a medical diagnosis of Stem cell transplant candidate.  He presents with the following impairments/functional limitations: impaired endurance, impaired self care skills, gait instability, other (comment) (risk of deconditioning). Patient with good strength and no noted sensation impairments in LE. He ambulates without assistance however reports decreased gait speed and impaired quality compared to baseline. Patient to be seen for duration of hospital stay for maintenance of current mobility level and to mitigate risk of deconditioning. No post-acute therapy recommended at this time.     Rehab Prognosis: Good; patient would benefit from acute skilled PT services to address these deficits and reach maximum level of function.    Recent Surgery: * No surgery found *      Plan:     During this hospitalization, patient to be seen 2 x/week to address the identified rehab impairments via gait training, therapeutic activities, therapeutic exercises, neuromuscular re-education and progress toward the following goals:    Plan of Care Expires:  10/15/24    Subjective     Chief Complaint: not stated  Patient/Family Comments/goals: Patient stating his gait quality and speed are decreased compared to baseline.  Pain/Comfort:  Pain Rating 1: 0/10  Pain Rating Post-Intervention 1: 0/10    Patients cultural, spiritual, Confucianist conflicts given the current situation: no    Living Environment:  Patient lives with wife in [facility] with 0STE. Tub shower with chair.  Prior to admission, patients level of function was independent  .  Equipment used at home: none.  DME owned (not currently used): none.  Upon discharge, patient will have assistance from wife.    Objective:     Communicated with RN prior to session.  Patient found HOB elevated with central line  upon PT entry to room. Wife in room, reporting she will translate for patient and denying need for interpretor.    General Precautions: Standard, fall  Orthopedic Precautions:N/A   Braces: N/A  Respiratory Status: Room air    Exams:  Sensation:    -       Intact  RLE ROM: WNL  RLE Strength: WNL  LLE ROM: WNL  LLE Strength: WNL    Functional Mobility:  Bed Mobility:     Scooting: independence  Supine to Sit: supervision  HOB flat, several attempts prior to success   Sit to Supine: independence  Transfers:     Sit to Stand:  supervision with no AD  Gait: patient ambulated 120' with supervision no AD  Deviations Noted: decreased gait speed, decreased step height R,L, and decreased step length R, L   Balance: good      AM-PAC 6 CLICK MOBILITY  Total Score:24       Treatment & Education:  Education: patient educated on POC, need for therapy, safety with mobility, discharge recommendations and equipment recommendations. Patient verbalized understanding of all topics.   Patient educated on importance of continued mobility with between session exercises given by therapist, ambulation with family or RN in room and hallway, sitting up in chair daily when able; emphasis on decreasing risk of deconditioning during hospital stay and improving overall function and wellbeing.      Patient left HOB elevated with all lines intact, call button in reach, and wife present.    GOALS:   Multidisciplinary Problems       Physical Therapy Goals          Problem: Physical Therapy    Goal Priority Disciplines Outcome Goal Variances Interventions   Physical Therapy Goal     PT, PT/OT Progressing     Description: Goals to be met by: 10/15/24     Patient will increase functional independence with mobility by  performin. Supine to sit with [place]  2. Sit to supine with [place]  3. Sit to stand transfer with [place]  4. Bed to chair transfer with [place] using No Assistive Device  5. Gait  x 500 feet with [place] using No Assistive Device.   6. Lower extremity exercise program x15 reps per handout, with assistance as needed                         History:     Past Medical History:   Diagnosis Date    Anticoagulant long-term use     Coronary artery disease     Hypertension     Myelodysplastic syndrome     Peripheral vascular disease, unspecified        Past Surgical History:   Procedure Laterality Date    BONE MARROW BIOPSY Left 2023    Procedure: Biopsy-bone marrow;  Surgeon: Harry Diamond MD;  Location: [facility] OR;  Service: Oncology;  Laterality: Left;    COLONOSCOPY N/A 2022    Procedure: COLONOSCOPY [place] Vaccinated will bring cards;  Surgeon: Dereje Simon MD;  Location: [facility];  Service: Endoscopy;  Laterality: N/A;  Do not cancel this order    INSERTION OF LEMONS CATHETER Right 2024    Procedure: INSERTION, CATHETER, CENTRAL VENOUS, LEMONS TRIPLE LUMEN;  Surgeon: Kg Patten MD;  Location: [address];  Service: General;  Laterality: Right;       Time Tracking:     PT Received On: 24  PT Start Time: 1012     PT Stop Time: 1022  PT Total Time (min): 10 min     Billable Minutes: Evaluation 10 minutes      2024

## 2024-09-17 NOTE — SUBJECTIVE & OBJECTIVE
Subjective:     Interval History: Day -2 from a  TBI PT Cy haplo (son) allogeneic BMT for MDS. Remains afebrile. VSS. Tolerating chemo well thus far. [place] planned on 9/19 at 11 am. Will receive fresh product later that day. Weight up 5 lbs from admission, but patient does not appear volume overloaded. Lungs clear. Denies SOB. Will defer diuresis today. Irish-speaking  utilized for assessment.    Objective:     Vital Signs (Most Recent):  Temp: 98.3 °F (36.8 °C) (09/17/24 1129)  Pulse: 73 (09/17/24 1129)  Resp: 18 (09/17/24 1129)  BP: 127/73 (09/17/24 1129)  SpO2: 99 % (09/17/24 1129) Vital Signs (24h Range):  Temp:  [97.9 °F (36.6 °C)-99.3 °F (37.4 °C)] 98.3 °F (36.8 °C)  Pulse:  [62-78] 73  Resp:  [16-18] 18  SpO2:  [94 %-99 %] 99 %  BP: (100-134)/(58-75) 127/73     Weight: 76.5 kg (168 lb 8.7 oz)  Body mass index is 24.89 kg/m².  Body surface area is 1.93 meters squared.    ECOG SCORE           [unfilled]    Intake/Output - Last 3 Shifts         09/15 0700  09/16 0659 09/16 0700  09/17 0659 09/17 0700  09/18 0659    P.O. 901 1350 725    I.V. (mL/kg)  2852.7 (37.3) 1079.8 (14.1)    Blood 700      IV Piggyback  115.9 599.7    Total Intake(mL/kg) 1601 (21.1) 4318.6 (56.5) 2404.5 (31.5)    Urine (mL/kg/hr) 1625 (0.9) 3000 (1.6) 1525 (2.8)    Stool 0 0     Total Output 1625 3000 1525    Net -24 +1318.6 +879.5           Urine Occurrence 10 x      Stool Occurrence 1 x 0 x              Physical Exam  Constitutional:       Appearance: He is well-developed.   HENT:      Head: Normocephalic and atraumatic.      Mouth/Throat:      Pharynx: No oropharyngeal exudate.   Eyes:      General:         Right eye: No discharge.         Left eye: No discharge.      Conjunctiva/sclera: Conjunctivae normal.      Pupils: Pupils are equal, round, and reactive to light.   Cardiovascular:      Rate and Rhythm: Normal rate and regular rhythm.      Heart sounds: Normal heart sounds. No murmur heard.  Pulmonary:       Effort: Pulmonary effort is normal. No respiratory distress.      Breath sounds: Normal breath sounds. No wheezing or rales.   Abdominal:      General: Bowel sounds are normal. There is no distension.      Palpations: Abdomen is soft.      Tenderness: There is no abdominal tenderness.   Musculoskeletal:         General: No deformity. Normal range of motion.      Cervical back: Normal range of motion and neck supple.   Skin:     General: Skin is warm and dry.      Findings: No erythema or rash.      Comments: Right chest wall vas cath. Dressing c/d/i. No sign of infection to site.   Neurological:      Mental Status: He is alert and oriented to person, place, and time.   Psychiatric:         Behavior: Behavior normal.         Thought Content: Thought content normal.         Judgment: Judgment normal.            Significant Labs:   CBC:   Recent Labs   Lab 09/16/24  0427 09/17/24  0404   WBC 0.80* 0.48*   HGB 8.6* 7.9*   HCT 25.5* 22.4*   PLT 43* 45*    and CMP:   Recent Labs   Lab 09/16/24 0427 09/17/24  0404    141   K 4.2 4.3    108   CO2 27 27   * 98   BUN 8 7*   CREATININE 0.9 0.8   CALCIUM 8.7 8.5*   PROT 5.7* 5.2*   ALBUMIN 3.2* 2.9*   BILITOT 0.4 0.6   ALKPHOS 78 64   AST 19 15   ALT 23 18   ANIONGAP 7* 6*       Diagnostic Results:  I have reviewed all pertinent imaging results/findings within the past 24 hours.

## 2024-09-17 NOTE — NURSING
Chemotherapy Note:  Consent & CAR in chart. CAR & BSA verified by two chemo certified RNs, Premedicated with PO dex. Fludarabine and patient verified at bedside by two chemo certified RNs, Margareth. Positive blood noted to right triple lumen Vaughn. Fludarabine (FLUDARA) 75 mg in sodium chloride 0.9% 118 mL IVPB administered to right triple lumen vaughn over 30 minutes at 236 ml/hr. Chemotherapy precautions maintained & patient educated.

## 2024-09-17 NOTE — PROGRESS NOTES
Janusz Ma - Oncology ([facility]  Hematology  Bone Marrow Transplant  Progress Note    Patient Name: Guillaume Salinas  Admission Date: 9/13/2024  Hospital Length of Stay: 4 days  Code Status: Full Code    Subjective:     Interval History: Day -2 from a  TBI PT Cy haplo (son) allogeneic BMT for MDS. Remains afebrile. VSS. Tolerating chemo well thus far. [place] planned on 9/19 at 11 am. Will receive fresh product later that day. Weight up 5 lbs from admission, but patient does not appear volume overloaded. Lungs clear. Denies SOB. Will defer diuresis today. Irish-speaking  utilized for assessment.    Objective:     Vital Signs (Most Recent):  Temp: 98.3 °F (36.8 °C) (09/17/24 1129)  Pulse: 73 (09/17/24 1129)  Resp: 18 (09/17/24 1129)  BP: 127/73 (09/17/24 1129)  SpO2: 99 % (09/17/24 1129) Vital Signs (24h Range):  Temp:  [97.9 °F (36.6 °C)-99.3 °F (37.4 °C)] 98.3 °F (36.8 °C)  Pulse:  [62-78] 73  Resp:  [16-18] 18  SpO2:  [94 %-99 %] 99 %  BP: (100-134)/(58-75) 127/73     Weight: 76.5 kg (168 lb 8.7 oz)  Body mass index is 24.89 kg/m².  Body surface area is 1.93 meters squared.    ECOG SCORE           [unfilled]    Intake/Output - Last 3 Shifts         09/15 0700 09/16 0659 09/16 0700 09/17 0659 09/17 0700 09/18 0659    P.O. 901 1350 725    I.V. (mL/kg)  2852.7 (37.3) 1079.8 (14.1)    Blood 700      IV Piggyback  115.9 599.7    Total Intake(mL/kg) 1601 (21.1) 4318.6 (56.5) 2404.5 (31.5)    Urine (mL/kg/hr) 1625 (0.9) 3000 (1.6) 1525 (2.8)    Stool 0 0     Total Output 1625 3000 1525    Net -24 +1318.6 +879.5           Urine Occurrence 10 x      Stool Occurrence 1 x 0 x              Physical Exam  Constitutional:       Appearance: He is well-developed.   HENT:      Head: Normocephalic and atraumatic.      Mouth/Throat:      Pharynx: No oropharyngeal exudate.   Eyes:      General:         Right eye: No discharge.         Left eye: No discharge.      Conjunctiva/sclera: Conjunctivae normal.       Pupils: Pupils are equal, round, and reactive to light.   Cardiovascular:      Rate and Rhythm: Normal rate and regular rhythm.      Heart sounds: Normal heart sounds. No murmur heard.  Pulmonary:      Effort: Pulmonary effort is normal. No respiratory distress.      Breath sounds: Normal breath sounds. No wheezing or rales.   Abdominal:      General: Bowel sounds are normal. There is no distension.      Palpations: Abdomen is soft.      Tenderness: There is no abdominal tenderness.   Musculoskeletal:         General: No deformity. Normal range of motion.      Cervical back: Normal range of motion and neck supple.   Skin:     General: Skin is warm and dry.      Findings: No erythema or rash.      Comments: Right chest wall vas cath. Dressing c/d/i. No sign of infection to site.   Neurological:      Mental Status: He is alert and oriented to person, place, and time.   Psychiatric:         Behavior: Behavior normal.         Thought Content: Thought content normal.         Judgment: Judgment normal.            Significant Labs:   CBC:   Recent Labs   Lab 09/16/24  0427 09/17/24  0404   WBC 0.80* 0.48*   HGB 8.6* 7.9*   HCT 25.5* 22.4*   PLT 43* 45*    and CMP:   Recent Labs   Lab 09/16/24  0427 09/17/24  0404    141   K 4.2 4.3    108   CO2 27 27   * 98   BUN 8 7*   CREATININE 0.9 0.8   CALCIUM 8.7 8.5*   PROT 5.7* 5.2*   ALBUMIN 3.2* 2.9*   BILITOT 0.4 0.6   ALKPHOS 78 64   AST 19 15   ALT 23 18   ANIONGAP 7* 6*       Diagnostic Results:  I have reviewed all pertinent imaging results/findings within the past 24 hours.  Assessment/Plan:     * Stem cell transplant candidate  - Patient of Dr. Paco Hickey with MDS  - Admitting 9/13/24 for a  TBI PT Cy haplo (son) allogeneic SCT  - Patient is B+. Donor is A+.  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 Patient will start letermovir ppx on 9/24/24.  - [place] is planned on 9/19/24 at 11 am. Patient will receive fresh product later that  day.  - Of note, cilostazol may interact with tacro. (Currently on hold for plts < 50K).  - See treatment plan below:    Planned conditioning regimen:  Fludarabine on Day -5, -4, -3, and -2  Melphalan on Day -2  TBI on Day -1     Planned  GVHD Prophylaxis:  Cyclophosphamide (with Mesna) on Days +3 and +4  Mycophenolate starting on Day +5  Tacrolimus starting on Day +5     Antimicrobial Prophylaxis:  Acyclovir starting on Day -5  Levofloxacin starting on Day -1  Micafungin on Day -1 through Day +4  Letermovir starting on Day +5 (if CMV PCR drawn on day 0 results negative)  Posaconazole starting on Day +5  Bactrim starting on Day +30     SOS/VOD Prophylaxis:  Ursodiol on Day -5 through Day +30      Growth Factor Support:  Neupogen starting on Day +5     Caregiver: wife   Post-transplant discharge plans: home        Myelodysplastic syndrome  From Dr Hickey's clinic note 8/5:  Stem cell transplant candidate: We discussed the role for allogeneic stem cell transplantation in this disease process as a potentially curative option. We had an extensive discussion about the rationale, logistics, risks, and benefits. We reviewed the requirement to stay in the [place] area for 100 days with a caregiver at all times. We discussed the risks, including infection, graft failure, organ toxicity, graft versus host disease, relapse of disease, and secondary cancers. We reviewed the need for long-term immunosuppression and need for close monitoring. HCT-CI of 1 (intermediate risk). We had a prolonged discussion today regarding his recent deconditioning, Cdiff, and underlying disease. He is now much improved since last seen, and is improving his strength since starting to work with PT. Diarrhea now controlled. We discussed that our plan to move forward with the transplant process.   Coordinator: Fay Stephens  Regimen:  + 2Gy TBI  Donor: son (haplo)   Graft source: BM  - Admitted 9/13/24 for a  TBI PT Cy haplo (son)  allogeneic BMT    Pancytopenia  - Expected to worsen following chemotherapy  - Daily CBC while inpatient  - Transfuse for hgb <7 Plt  or <10K  - Continue antimicrobial ppx  - Holding cilostazol for plts < 50K    History of Clostridioides difficile infection  - Continue oral vanc ppx per transplant protocol    Coronary artery disease involving native coronary artery of native heart without angina pectoris  - Holding home coreg for hypotension. Resume as indicated.    Hypertension, essential  - Holding home Coreg for fluid responsive hypotension. Resume as indicated.    Insomnia  - Continue home PRN Trazodone    Neuropathy  - Continue home gabapentin        VTE Risk Mitigation (From admission, onward)           Ordered     heparin, porcine (PF) 100 unit/mL injection flush 300 Units  As needed (PRN)         09/13/24 0046                    Disposition: Inpatient for allogeneic SCT.    Luz Napoles, ALLYSSA  Bone Marrow Transplant  Janusz freddy - Oncology ([facility])

## 2024-09-17 NOTE — PLAN OF CARE
Problem: Physical Therapy  Goal: Physical Therapy Goal  Description: Goals to be met by: 10/15/24     Patient will increase functional independence with mobility by performin. Supine to sit with [place]  2. Sit to supine with [place]  3. Sit to stand transfer with [place]  4. Bed to chair transfer with [place] using No Assistive Device  5. Gait  x 500 feet with [place] using No Assistive Device.   6. Lower extremity exercise program x15 reps per handout, with assistance as needed    Outcome: Progressing   PT eval completed and goals established. Pt will begin PT POC, progressing as tolerated.

## 2024-09-17 NOTE — PROGRESS NOTES
Patient was seen for psychosocial check-in while in the hospital. [place] , Grace ID#[id number], was present via telephone for Estonian language interpretive services. Patient confirmed his identity via two identifiers and agreed to consent to meet with me today. He also gave consent for his wife and daughter to be present while we meet. Patient noted he is coping well at this time and denied any recent stress or difficulties with mood. He was able to note activities that he has engaged in while in the hospital to help cope. Patient denied any SI/HI. He agreed for me to continue following him while he is in the hospital to assess his coping with next estimated contact with patient in about a week. Patient noted no other needs at this time and was encouraged to let his medical team know if he has any difficulties with coping.      Giles Magana Psy.D.  Clinical Psychologist  [place] License #[id number]  [place] License #[id number]

## 2024-09-17 NOTE — PLAN OF CARE
Plan of care reviewed with patient. Pt is day -2 Flu/[place] Haplo SCT. Pt remains afebrile. Free from falls or injury. No complaints of pain. No complaints of nausea. NS20K infusing at 150. Oral vanc given as scheduled. Bed locked in lowest position, non skid socks on, call light within reach. Pt instructed to call if any assistance is needed. Pt refusing bed alarm at this time. Vitals stable. Wife at bedside. Will cont to jackson pt.

## 2024-09-17 NOTE — ASSESSMENT & PLAN NOTE
- Patient of Dr. Paco Hickey with MDS  - Admitting 9/13/24 for a  TBI PT Cy haplo (son) allogeneic SCT  - Patient is B+. Donor is A+.  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 Patient will start letermovir ppx on 9/24/24.  - [place] is planned on 9/19/24 at 11 am. Patient will receive fresh product later that day.  - Of note, cilostazol may interact with tacro. (Currently on hold for plts < 50K).  - See treatment plan below:    Planned conditioning regimen:  Fludarabine on Day -5, -4, -3, and -2  Melphalan on Day -2  TBI on Day -1     Planned  GVHD Prophylaxis:  Cyclophosphamide (with [place]na) on Days +3 and +4  Mycophenolate starting on Day +5  Tacrolimus starting on Day +5     Antimicrobial Prophylaxis:  Acyclovir starting on Day -5  Levofloxacin starting on Day -1  Micafungin on Day -1 through Day +4  Letermovir starting on Day +5 (if CMV PCR drawn on day 0 results negative)  Posaconazole starting on Day +5  Bactrim starting on Day +30     SOS/VOD Prophylaxis:  Ursodiol on Day -5 through Day +30      Growth Factor Support:  Neupogen starting on Day +5     Caregiver: wife   Post-transplant discharge plans: home

## 2024-09-17 NOTE — ASSESSMENT & PLAN NOTE
From Dr Hickey's clinic note 8/5:  Stem cell transplant candidate: We discussed the role for allogeneic stem cell transplantation in this disease process as a potentially curative option. We had an extensive discussion about the rationale, logistics, risks, and benefits. We reviewed the requirement to stay in the [place] area for 100 days with a caregiver at all times. We discussed the risks, including infection, graft failure, organ toxicity, graft versus host disease, relapse of disease, and secondary cancers. We reviewed the need for long-term immunosuppression and need for close monitoring. HCT-CI of 1 (intermediate risk). We had a prolonged discussion today regarding his recent deconditioning, Cdiff, and underlying disease. He is now much improved since last seen, and is improving his strength since starting to work with PT. Diarrhea now controlled. We discussed that our plan to move forward with the transplant process.   Coordinator: Fay Stephens  Regimen:  + 2Gy TBI  Donor: son (haplo)   Graft source: BM  - Admitted 9/13/24 for a  TBI PT Cy haplo (son) allogeneic BMT

## 2024-09-17 NOTE — ASSESSMENT & PLAN NOTE
- Expected to worsen following chemotherapy  - Daily CBC while inpatient  - Transfuse for hgb <7 Plt  or <10K  - Continue antimicrobial ppx  - Holding cilostazol for plts < 50K

## 2024-09-17 NOTE — PLAN OF CARE
POC reviewed with patient; understanding verbalized. Utilized smart tablet at bedside for  assistance. Haplo Allo SCT Day -2. Fludarabine and melphalan given this shift. PO mag replaced this shift. NS with 20K @ 150 cc/hr. Pt voids independently via urinal. Bed alarm refused. Wife to remain at bedside. Pt. with nonskid footwear on, bed in lowest position, and locked with bed rails up x2.  Pt. instructed to call prior to getting OOB.  Pt. has call light and personal items within reach. Patient ambulates in room independently. VSS and afebrile this shift. All questions and concerns addressed at this time.

## 2024-09-18 LAB
ALBUMIN SERPL BCP-MCNC: 3 G/DL (ref 3.5–5.2)
ALP SERPL-CCNC: 69 U/L (ref 55–135)
ALT SERPL W/O P-5'-P-CCNC: 18 U/L (ref 10–44)
ANION GAP SERPL CALC-SCNC: 4 MMOL/L (ref 8–16)
AST SERPL-CCNC: 15 U/L (ref 10–40)
BASOPHILS # BLD AUTO: 0 K/UL (ref 0–0.2)
BASOPHILS NFR BLD: 0 % (ref 0–1.9)
BILIRUB SERPL-MCNC: 0.5 MG/DL (ref 0.1–1)
BUN SERPL-MCNC: 10 MG/DL (ref 8–23)
CALCIUM SERPL-MCNC: 8.8 MG/DL (ref 8.7–10.5)
CHLORIDE SERPL-SCNC: 108 MMOL/L (ref 95–110)
CO2 SERPL-SCNC: 26 MMOL/L (ref 23–29)
CREAT SERPL-MCNC: 0.7 MG/DL (ref 0.5–1.4)
DIFFERENTIAL METHOD BLD: ABNORMAL
EOSINOPHIL # BLD AUTO: 0 K/UL (ref 0–0.5)
EOSINOPHIL NFR BLD: 0 % (ref 0–8)
ERYTHROCYTE [DISTWIDTH] IN BLOOD BY AUTOMATED COUNT: 13.9 % (ref 11.5–14.5)
EST. GFR  (NO RACE VARIABLE): >60 ML/MIN/1.73 M^2
GLUCOSE SERPL-MCNC: 126 MG/DL (ref 70–110)
HCT VFR BLD AUTO: 23.2 % (ref 40–54)
HGB BLD-MCNC: 8.1 G/DL (ref 14–18)
IMM GRANULOCYTES # BLD AUTO: 0.01 K/UL (ref 0–0.04)
IMM GRANULOCYTES NFR BLD AUTO: 1.1 % (ref 0–0.5)
LYMPHOCYTES # BLD AUTO: 0 K/UL (ref 1–4.8)
LYMPHOCYTES NFR BLD: 2.2 % (ref 18–48)
MAGNESIUM SERPL-MCNC: 1.9 MG/DL (ref 1.6–2.6)
MCH RBC QN AUTO: 31.4 PG (ref 27–31)
MCHC RBC AUTO-ENTMCNC: 34.9 G/DL (ref 32–36)
MCV RBC AUTO: 90 FL (ref 82–98)
MONOCYTES # BLD AUTO: 0 K/UL (ref 0.3–1)
MONOCYTES NFR BLD: 1.1 % (ref 4–15)
NEUTROPHILS # BLD AUTO: 0.9 K/UL (ref 1.8–7.7)
NEUTROPHILS NFR BLD: 95.6 % (ref 38–73)
NRBC BLD-RTO: 0 /100 WBC
OHS QRS DURATION: 72 MS
OHS QTC CALCULATION: 423 MS
PHOSPHATE SERPL-MCNC: 2.7 MG/DL (ref 2.7–4.5)
PLATELET # BLD AUTO: 44 K/UL (ref 150–450)
PMV BLD AUTO: 12 FL (ref 9.2–12.9)
POTASSIUM SERPL-SCNC: 4.6 MMOL/L (ref 3.5–5.1)
PROT SERPL-MCNC: 5.4 G/DL (ref 6–8.4)
RBC # BLD AUTO: 2.58 M/UL (ref 4.6–6.2)
SODIUM SERPL-SCNC: 138 MMOL/L (ref 136–145)
TROPONIN I SERPL DL<=0.01 NG/ML-MCNC: <0.006 NG/ML (ref 0–0.03)
WBC # BLD AUTO: 0.91 K/UL (ref 3.9–12.7)

## 2024-09-18 PROCEDURE — 83735 ASSAY OF MAGNESIUM: CPT | Performed by: NURSE PRACTITIONER

## 2024-09-18 PROCEDURE — 84484 ASSAY OF TROPONIN QUANT: CPT | Performed by: NURSE PRACTITIONER

## 2024-09-18 PROCEDURE — 93010 ELECTROCARDIOGRAM REPORT: CPT | Mod: ,,, | Performed by: INTERNAL MEDICINE

## 2024-09-18 PROCEDURE — 80053 COMPREHEN METABOLIC PANEL: CPT | Performed by: NURSE PRACTITIONER

## 2024-09-18 PROCEDURE — 25000003 PHARM REV CODE 250: Performed by: NURSE PRACTITIONER

## 2024-09-18 PROCEDURE — 25000003 PHARM REV CODE 250: Performed by: INTERNAL MEDICINE

## 2024-09-18 PROCEDURE — 20600001 HC STEP DOWN PRIVATE ROOM

## 2024-09-18 PROCEDURE — 85025 COMPLETE CBC W/AUTO DIFF WBC: CPT | Performed by: NURSE PRACTITIONER

## 2024-09-18 PROCEDURE — 99232 SBSQ HOSP IP/OBS MODERATE 35: CPT | Mod: ,,, | Performed by: INTERNAL MEDICINE

## 2024-09-18 PROCEDURE — 93005 ELECTROCARDIOGRAM TRACING: CPT

## 2024-09-18 PROCEDURE — 63600175 PHARM REV CODE 636 W HCPCS: Performed by: INTERNAL MEDICINE

## 2024-09-18 PROCEDURE — 84100 ASSAY OF PHOSPHORUS: CPT | Performed by: NURSE PRACTITIONER

## 2024-09-18 RX ADMIN — MICAFUNGIN SODIUM 100 MG: 100 INJECTION, POWDER, LYOPHILIZED, FOR SOLUTION INTRAVENOUS at 08:09

## 2024-09-18 RX ADMIN — Medication 400 MG: at 08:09

## 2024-09-18 RX ADMIN — GABAPENTIN 600 MG: 300 CAPSULE ORAL at 08:09

## 2024-09-18 RX ADMIN — ACYCLOVIR 800 MG: 200 CAPSULE ORAL at 09:09

## 2024-09-18 RX ADMIN — OLANZAPINE 5 MG: 2.5 TABLET, FILM COATED ORAL at 08:09

## 2024-09-18 RX ADMIN — SODIUM CHLORIDE AND POTASSIUM CHLORIDE 150 ML/HR: .9; .15 SOLUTION INTRAVENOUS at 02:09

## 2024-09-18 RX ADMIN — URSODIOL 300 MG: 300 CAPSULE ORAL at 08:09

## 2024-09-18 RX ADMIN — ONDANSETRON 8 MG: 8 TABLET, ORALLY DISINTEGRATING ORAL at 01:09

## 2024-09-18 RX ADMIN — ACYCLOVIR 800 MG: 200 CAPSULE ORAL at 08:09

## 2024-09-18 RX ADMIN — Medication 400 MG: at 11:09

## 2024-09-18 RX ADMIN — Medication 1 DOSE: at 04:09

## 2024-09-18 RX ADMIN — OLANZAPINE 5 MG: 2.5 TABLET, FILM COATED ORAL at 09:09

## 2024-09-18 RX ADMIN — VANCOMYCIN HYDROCHLORIDE 125 MG: KIT at 08:09

## 2024-09-18 RX ADMIN — SODIUM CHLORIDE AND POTASSIUM CHLORIDE 150 ML/HR: .9; .15 SOLUTION INTRAVENOUS at 11:09

## 2024-09-18 RX ADMIN — VANCOMYCIN HYDROCHLORIDE 125 MG: KIT at 09:09

## 2024-09-18 RX ADMIN — PANTOPRAZOLE SODIUM 40 MG: 40 TABLET, DELAYED RELEASE ORAL at 08:09

## 2024-09-18 RX ADMIN — GABAPENTIN 600 MG: 300 CAPSULE ORAL at 09:09

## 2024-09-18 RX ADMIN — LEVOFLOXACIN 500 MG: 500 TABLET, FILM COATED ORAL at 08:09

## 2024-09-18 RX ADMIN — ONDANSETRON 8 MG: 8 TABLET, ORALLY DISINTEGRATING ORAL at 09:09

## 2024-09-18 RX ADMIN — Medication 1 DOSE: at 08:09

## 2024-09-18 RX ADMIN — URSODIOL 300 MG: 300 CAPSULE ORAL at 09:09

## 2024-09-18 RX ADMIN — ONDANSETRON 8 MG: 8 TABLET, ORALLY DISINTEGRATING ORAL at 05:09

## 2024-09-18 RX ADMIN — Medication 1 DOSE: at 09:09

## 2024-09-18 RX ADMIN — TRAZODONE HYDROCHLORIDE 50 MG: 50 TABLET ORAL at 09:09

## 2024-09-18 RX ADMIN — Medication 1 DOSE: at 01:09

## 2024-09-18 RX ADMIN — SODIUM CHLORIDE AND POTASSIUM CHLORIDE 150 ML/HR: .9; .15 SOLUTION INTRAVENOUS at 07:09

## 2024-09-18 RX ADMIN — SODIUM CHLORIDE AND POTASSIUM CHLORIDE 150 ML/HR: .9; .15 SOLUTION INTRAVENOUS at 12:09

## 2024-09-18 NOTE — PLAN OF CARE
Plan of care assumed from 0700 - 1900     Stem cell transplant candidate .Plan of care reviewed with patient.No acute event this shift. Haplo allo SCT day - 1 .    - A&O x 4   - Remained afebrile   - Spo2 maintains in room air   - Total body irradiation (TBI ) done today   - Right triple lumen central line dressing changed  - NS with kcl 20 meq infusing at 150 ml /hr   - Electrolyte replaced .  - Regular diet with fair intake  - One bowel movement this shift.  -Complained of chest/abdomin pain NP sonali notified ordered EKG and Troponin I    - EKG done   - Ambulates independently       Frequent patients round done.Bed in low and locked position.call light and personal; items within a reach.Patient denies nausea. Remaining free from falls and injury throughout the shift.Plan of care on going .

## 2024-09-18 NOTE — ASSESSMENT & PLAN NOTE
- Patient of Dr. Paco Hickey with MDS  - Admitting 9/13/24 for a  TBI PT Cy haplo (son) allogeneic SCT. Today is Day -1.  - Patient is B+. Donor is A+.  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 Patient will start letermovir ppx on 9/24/24.  - [place] is planned on 9/19/24 at 11 am. Patient will receive fresh product later that day.  - Of note, cilostazol may interact with tacro. (Currently on hold for plts < 50K).  - See treatment plan below:    Planned conditioning regimen:  Fludarabine on Day -5, -4, -3, and -2  Melphalan on Day -2  TBI on Day -1     Planned  GVHD Prophylaxis:  Cyclophosphamide (with Mesna) on Days +3 and +4  Mycophenolate starting on Day +5  Tacrolimus starting on Day +5     Antimicrobial Prophylaxis:  Acyclovir starting on Day -5  Levofloxacin starting on Day -1  Micafungin on Day -1 through Day +4  Letermovir starting on Day +5 (if CMV PCR drawn on day 0 results negative)  Posaconazole starting on Day +5  Bactrim starting on Day +30     SOS/VOD Prophylaxis:  Ursodiol on Day -5 through Day +30      Growth Factor Support:  Neupogen starting on Day +5     Caregiver: wife   Post-transplant discharge plans: home

## 2024-09-18 NOTE — NURSING
Patient complained of chest/abdomin pain as soon as back from TBI . Vital sign monitored see the flowsheet.NP sonali notified.Ordered Troponin I and EKG. Troponin  sample collected awaiting result .

## 2024-09-18 NOTE — PROGRESS NOTES
Janusz Ma - Oncology ([facility])  Hematology  Bone Marrow Transplant  Progress Note    Patient Name: Guillaume Salinas  Admission Date: 9/13/2024  Hospital Length of Stay: 5 days  Code Status: Full Code    Subjective:     Interval History: Day -1 from a  TBI PT Cy haplo (son) allogeneic BMT for MDS. TBI today. Nurse reported that patient c/o chest/epigastric/upper abdominal pain on return from TBI. Stated that patient declined pain med and reported that pain was improving. Do not suspect cardiac etiology but will get EKG and troponin out of an abundance of cautions. Remains afebrile. VSS. Will get BMT tomorrow following son's harvest. Timing and number of cells TBD.    Objective:     Vital Signs (Most Recent):  Temp: 98 °F (36.7 °C) (09/18/24 1256)  Pulse: 79 (09/18/24 1256)  Resp: 16 (09/18/24 1256)  BP: 130/75 (09/18/24 1256)  SpO2: 97 % (09/18/24 1256) Vital Signs (24h Range):  Temp:  [97.6 °F (36.4 °C)-98.4 °F (36.9 °C)] 98 °F (36.7 °C)  Pulse:  [] 79  Resp:  [16-19] 16  SpO2:  [93 %-98 %] 97 %  BP: (101-130)/(53-75) 130/75     Weight: 77 kg (169 lb 13.8 oz)  Body mass index is 25.08 kg/m².  Body surface area is 1.94 meters squared.    ECOG SCORE           [unfilled]    Intake/Output - Last 3 Shifts         09/16 0700 09/17 0659 09/17 0700 09/18 0659 09/18 0700 09/19 0659    P.O. 1350 1475     I.V. (mL/kg) 2852.7 (37.3) 3371.8 (43.8)     Blood       IV Piggyback 115.9 599.7     Total Intake(mL/kg) 4318.6 (56.5) 5446.5 (70.7)     Urine (mL/kg/hr) 3000 (1.6) 4225 (2.3)     Stool 0 0     Total Output 3000 4225     Net +1318.6 +1221.5            Urine Occurrence  1 x     Stool Occurrence 0 x 1 x 1 x             Physical Exam  Constitutional:       Appearance: He is well-developed.   HENT:      Head: Normocephalic and atraumatic.      Mouth/Throat:      Pharynx: No oropharyngeal exudate.   Eyes:      General:         Right eye: No discharge.         Left eye: No discharge.      Conjunctiva/sclera:  Conjunctivae normal.      Pupils: Pupils are equal, round, and reactive to light.   Cardiovascular:      Rate and Rhythm: Normal rate and regular rhythm.      Heart sounds: Normal heart sounds. No murmur heard.  Pulmonary:      Effort: Pulmonary effort is normal. No respiratory distress.      Breath sounds: Normal breath sounds. No wheezing or rales.   Abdominal:      General: Bowel sounds are normal. There is no distension.      Palpations: Abdomen is soft.      Tenderness: There is no abdominal tenderness.   Musculoskeletal:         General: No deformity. Normal range of motion.      Cervical back: Normal range of motion and neck supple.   Skin:     General: Skin is warm and dry.      Findings: No erythema or rash.      Comments: Right chest wall vas cath. Dressing c/d/i. No sign of infection to site.   Neurological:      Mental Status: He is alert and oriented to person, place, and time.   Psychiatric:         Behavior: Behavior normal.         Thought Content: Thought content normal.         Judgment: Judgment normal.            Significant Labs:   CBC:   Recent Labs   Lab 09/17/24  0404 09/18/24  0426   WBC 0.48* 0.91*   HGB 7.9* 8.1*   HCT 22.4* 23.2*   PLT 45* 44*    and CMP:   Recent Labs   Lab 09/17/24  0404 09/18/24  0426    138   K 4.3 4.6    108   CO2 27 26   GLU 98 126*   BUN 7* 10   CREATININE 0.8 0.7   CALCIUM 8.5* 8.8   PROT 5.2* 5.4*   ALBUMIN 2.9* 3.0*   BILITOT 0.6 0.5   ALKPHOS 64 69   AST 15 15   ALT 18 18   ANIONGAP 6* 4*       Diagnostic Results:  I have reviewed all pertinent imaging results/findings within the past 24 hours.  Assessment/Plan:     * Stem cell transplant candidate  - Patient of Dr. Paco Hickey with MDS  - Admitting 9/13/24 for a  TBI PT Cy haplo (son) allogeneic SCT. Today is Day -1.  - Patient is B+. Donor is A+.  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 Patient will start letermovir ppx on 9/24/24.  - [place] is planned on 9/19/24 at 11 am.  Patient will receive fresh product later that day.  - Of note, cilostazol may interact with tacro. (Currently on hold for plts < 50K).  - See treatment plan below:    Planned conditioning regimen:  Fludarabine on Day -5, -4, -3, and -2  Melphalan on Day -2  TBI on Day -1     Planned  GVHD Prophylaxis:  Cyclophosphamide (with Mesna) on Days +3 and +4  Mycophenolate starting on Day +5  Tacrolimus starting on Day +5     Antimicrobial Prophylaxis:  Acyclovir starting on Day -5  Levofloxacin starting on Day -1  Micafungin on Day -1 through Day +4  Letermovir starting on Day +5 (if CMV PCR drawn on day 0 results negative)  Posaconazole starting on Day +5  Bactrim starting on Day +30     SOS/VOD Prophylaxis:  Ursodiol on Day -5 through Day +30      Growth Factor Support:  Neupogen starting on Day +5     Caregiver: wife   Post-transplant discharge plans: home        Myelodysplastic syndrome  From Dr Hickey's clinic note 8/5:  Stem cell transplant candidate: We discussed the role for allogeneic stem cell transplantation in this disease process as a potentially curative option. We had an extensive discussion about the rationale, logistics, risks, and benefits. We reviewed the requirement to stay in the [place] area for 100 days with a caregiver at all times. We discussed the risks, including infection, graft failure, organ toxicity, graft versus host disease, relapse of disease, and secondary cancers. We reviewed the need for long-term immunosuppression and need for close monitoring. HCT-CI of 1 (intermediate risk). We had a prolonged discussion today regarding his recent deconditioning, Cdiff, and underlying disease. He is now much improved since last seen, and is improving his strength since starting to work with PT. Diarrhea now controlled. We discussed that our plan to move forward with the transplant process.   Coordinator: Fay Stephens  Regimen:  + 2Gy TBI  Donor: son (haplo)   Graft source: BM  - Admitted  9/13/24 for a  TBI PT Cy haplo (son) allogeneic BMT    Pancytopenia  - Expected to worsen following chemotherapy  - Daily CBC while inpatient  - Transfuse for hgb <7 Plt  or <10K  - Continue antimicrobial ppx  - Holding cilostazol for plts < 50K    History of Clostridioides difficile infection  - Continue oral vanc ppx per transplant protocol    Coronary artery disease involving native coronary artery of native heart without angina pectoris  - Holding home coreg for hypotension. Resume as indicated.    Hypertension, essential  - Holding home Coreg for fluid responsive hypotension. Resume as indicated.    Insomnia  - Continue home PRN Trazodone    Neuropathy  - Continue home gabapentin        VTE Risk Mitigation (From admission, onward)           Ordered     heparin, porcine (PF) 100 unit/mL injection flush 300 Units  As needed (PRN)         09/13/24 1423                    Disposition: Inpatient for allogeneic BMT.    Luz Napoles, NP  Bone Marrow Transplant  Janusz freddy - Oncology ([facility])

## 2024-09-18 NOTE — PLAN OF CARE
Plan of care reviewed with patient. Pt is day -1 Flu/Rosalee Haplo SCT. Pt remains afebrile. Free from falls or injury. No complaints of pain. No complaints of nausea. NS20K infusing at 150. Oral vanc given as scheduled. Bed locked in lowest position, non skid socks on, call light within reach. Pt instructed to call if any assistance is needed. Pt refusing bed alarm at this time. Vitals stable. Wife at bedside. Will cont to [facility]son pt.

## 2024-09-18 NOTE — SUBJECTIVE & OBJECTIVE
Subjective:     Interval History: ay -1 from a  TBI PT Cy haplo (son) allogeneic BMT for MDS. TBI today. Nurse reported that patient c/o chest/epigastric/upper abdominal pain on return from TBI. Stated that patient declined pain med and reported that pain was improving. Do not suspect cardiac etiology but will get EKG and troponin out of an abundance of cautions. Remains afebrile. VSS. Will get BMT tomorrow following son's harvest. Timing and number of cells TBD.    Objective:     Vital Signs (Most Recent):  Temp: 98 °F (36.7 °C) (09/18/24 1256)  Pulse: 79 (09/18/24 1256)  Resp: 16 (09/18/24 1256)  BP: 130/75 (09/18/24 1256)  SpO2: 97 % (09/18/24 1256) Vital Signs (24h Range):  Temp:  [97.6 °F (36.4 °C)-98.4 °F (36.9 °C)] 98 °F (36.7 °C)  Pulse:  [] 79  Resp:  [16-19] 16  SpO2:  [93 %-98 %] 97 %  BP: (101-130)/(53-75) 130/75     Weight: 77 kg (169 lb 13.8 oz)  Body mass index is 25.08 kg/m².  Body surface area is 1.94 meters squared.    ECOG SCORE           [unfilled]    Intake/Output - Last 3 Shifts         09/16 0700  09/17 0659 09/17 0700  09/18 0659 09/18 0700  09/19 0659    P.O. 1350 1475     I.V. (mL/kg) 2852.7 (37.3) 3371.8 (43.8)     Blood       IV Piggyback 115.9 599.7     Total Intake(mL/kg) 4318.6 (56.5) 5446.5 (70.7)     Urine (mL/kg/hr) 3000 (1.6) 4225 (2.3)     Stool 0 0     Total Output 3000 4225     Net +1318.6 +1221.5            Urine Occurrence  1 x     Stool Occurrence 0 x 1 x 1 x             Physical Exam  Constitutional:       Appearance: He is well-developed.   HENT:      Head: Normocephalic and atraumatic.      Mouth/Throat:      Pharynx: No oropharyngeal exudate.   Eyes:      General:         Right eye: No discharge.         Left eye: No discharge.      Conjunctiva/sclera: Conjunctivae normal.      Pupils: Pupils are equal, round, and reactive to light.   Cardiovascular:      Rate and Rhythm: Normal rate and regular rhythm.      Heart sounds: Normal heart sounds. No murmur  heard.  Pulmonary:      Effort: Pulmonary effort is normal. No respiratory distress.      Breath sounds: Normal breath sounds. No wheezing or rales.   Abdominal:      General: Bowel sounds are normal. There is no distension.      Palpations: Abdomen is soft.      Tenderness: There is no abdominal tenderness.   Musculoskeletal:         General: No deformity. Normal range of motion.      Cervical back: Normal range of motion and neck supple.   Skin:     General: Skin is warm and dry.      Findings: No erythema or rash.      Comments: Right chest wall vas cath. Dressing c/d/i. No sign of infection to site.   Neurological:      Mental Status: He is alert and oriented to person, place, and time.   Psychiatric:         Behavior: Behavior normal.         Thought Content: Thought content normal.         Judgment: Judgment normal.            Significant Labs:   CBC:   Recent Labs   Lab 09/17/24  0404 09/18/24  0426   WBC 0.48* 0.91*   HGB 7.9* 8.1*   HCT 22.4* 23.2*   PLT 45* 44*    and CMP:   Recent Labs   Lab 09/17/24  0404 09/18/24  0426    138   K 4.3 4.6    108   CO2 27 26   GLU 98 126*   BUN 7* 10   CREATININE 0.8 0.7   CALCIUM 8.5* 8.8   PROT 5.2* 5.4*   ALBUMIN 2.9* 3.0*   BILITOT 0.6 0.5   ALKPHOS 64 69   AST 15 15   ALT 18 18   ANIONGAP 6* 4*       Diagnostic Results:  I have reviewed all pertinent imaging results/findings within the past 24 hours.

## 2024-09-18 NOTE — ASSESSMENT & PLAN NOTE
From Dr Hickey's clinic note 8/5:  Stem cell transplant candidate: We discussed the role for allogeneic stem cell transplantation in this disease process as a potentially curative option. We had an extensive discussion about the rationale, logistics, risks, and benefits. We reviewed the requirement to stay in the [place] area for 100 days with a caregiver at all times. We discussed the risks, including infection, graft failure, organ toxicity, graft versus host disease, relapse of disease, and secondary cancers. We reviewed the need for long-term immunosuppression and need for close monitoring. HCT-CI of 1 (intermediate risk). We had a prolonged discussion today regarding his recent deconditioning, Cdiff, and underlying disease. He is now much improved since last seen, and is improving his strength since starting to work with PT. Diarrhea now controlled. We discussed that our plan to move forward with the transplant process.   Coordinator: Fay Stephens  Regimen:  + 2Gy TBI  Donor: son (haplo)   Graft source: BM  - Admitted 9/13/24 for a  TBI PT Cy haplo (son) allogeneic BMT

## 2024-09-19 LAB
ALBUMIN SERPL BCP-MCNC: 2.7 G/DL (ref 3.5–5.2)
ALP SERPL-CCNC: 56 U/L (ref 55–135)
ALT SERPL W/O P-5'-P-CCNC: 17 U/L (ref 10–44)
ANION GAP SERPL CALC-SCNC: 6 MMOL/L (ref 8–16)
AST SERPL-CCNC: 19 U/L (ref 10–40)
BASOPHILS # BLD AUTO: 0 K/UL (ref 0–0.2)
BASOPHILS NFR BLD: 0 % (ref 0–1.9)
BILIRUB SERPL-MCNC: 0.5 MG/DL (ref 0.1–1)
BLD PROD TYP BPU: NORMAL
BLOOD UNIT EXPIRATION DATE: NORMAL
BLOOD UNIT TYPE CODE: 6200
BLOOD UNIT TYPE: NORMAL
BUN SERPL-MCNC: 12 MG/DL (ref 8–23)
CALCIUM SERPL-MCNC: 7.7 MG/DL (ref 8.7–10.5)
CHLORIDE SERPL-SCNC: 110 MMOL/L (ref 95–110)
CMV DNA SPEC QL NAA+PROBE: NORMAL
CO2 SERPL-SCNC: 25 MMOL/L (ref 23–29)
CODING SYSTEM: NORMAL
CREAT SERPL-MCNC: 0.8 MG/DL (ref 0.5–1.4)
CROSSMATCH INTERPRETATION: NORMAL
CYTOMEGALOVIRUS PCR, QUANT: NOT DETECTED IU/ML
DIFFERENTIAL METHOD BLD: ABNORMAL
DISPENSE STATUS: NORMAL
EOSINOPHIL # BLD AUTO: 0 K/UL (ref 0–0.5)
EOSINOPHIL NFR BLD: 0 % (ref 0–8)
ERYTHROCYTE [DISTWIDTH] IN BLOOD BY AUTOMATED COUNT: 14.3 % (ref 11.5–14.5)
EST. GFR  (NO RACE VARIABLE): >60 ML/MIN/1.73 M^2
GLUCOSE SERPL-MCNC: 96 MG/DL (ref 70–110)
HCT VFR BLD AUTO: 20.9 % (ref 40–54)
HGB BLD-MCNC: 7 G/DL (ref 14–18)
IMM GRANULOCYTES # BLD AUTO: 0 K/UL (ref 0–0.04)
IMM GRANULOCYTES NFR BLD AUTO: 0 % (ref 0–0.5)
LYMPHOCYTES # BLD AUTO: 0 K/UL (ref 1–4.8)
LYMPHOCYTES NFR BLD: 11.1 % (ref 18–48)
MAGNESIUM SERPL-MCNC: 1.9 MG/DL (ref 1.6–2.6)
MCH RBC QN AUTO: 31.1 PG (ref 27–31)
MCHC RBC AUTO-ENTMCNC: 33.5 G/DL (ref 32–36)
MCV RBC AUTO: 93 FL (ref 82–98)
MONOCYTES # BLD AUTO: 0 K/UL (ref 0.3–1)
MONOCYTES NFR BLD: 0 % (ref 4–15)
NEUTROPHILS # BLD AUTO: 0.2 K/UL (ref 1.8–7.7)
NEUTROPHILS NFR BLD: 88.9 % (ref 38–73)
NRBC BLD-RTO: 0 /100 WBC
PHOSPHATE SERPL-MCNC: 4.6 MG/DL (ref 2.7–4.5)
PLATELET # BLD AUTO: 29 K/UL (ref 150–450)
PLATELET BLD QL SMEAR: ABNORMAL
PMV BLD AUTO: 12.6 FL (ref 9.2–12.9)
POTASSIUM SERPL-SCNC: 4.5 MMOL/L (ref 3.5–5.1)
PROT SERPL-MCNC: 4.6 G/DL (ref 6–8.4)
RBC # BLD AUTO: 2.25 M/UL (ref 4.6–6.2)
SODIUM SERPL-SCNC: 141 MMOL/L (ref 136–145)
UNIT NUMBER: NORMAL
WBC # BLD AUTO: 0.18 K/UL (ref 3.9–12.7)

## 2024-09-19 PROCEDURE — 86920 COMPATIBILITY TEST SPIN: CPT | Performed by: INTERNAL MEDICINE

## 2024-09-19 PROCEDURE — 63600175 PHARM REV CODE 636 W HCPCS: Performed by: NURSE PRACTITIONER

## 2024-09-19 PROCEDURE — 84100 ASSAY OF PHOSPHORUS: CPT | Performed by: NURSE PRACTITIONER

## 2024-09-19 PROCEDURE — 83735 ASSAY OF MAGNESIUM: CPT | Performed by: NURSE PRACTITIONER

## 2024-09-19 PROCEDURE — 25000003 PHARM REV CODE 250: Performed by: NURSE PRACTITIONER

## 2024-09-19 PROCEDURE — 85025 COMPLETE CBC W/AUTO DIFF WBC: CPT | Performed by: NURSE PRACTITIONER

## 2024-09-19 PROCEDURE — 63600175 PHARM REV CODE 636 W HCPCS: Performed by: INTERNAL MEDICINE

## 2024-09-19 PROCEDURE — 38214 VOLUME DEPLETE OF HARVEST: CPT

## 2024-09-19 PROCEDURE — 25000003 PHARM REV CODE 250: Performed by: INTERNAL MEDICINE

## 2024-09-19 PROCEDURE — 99232 SBSQ HOSP IP/OBS MODERATE 35: CPT | Mod: 25,,, | Performed by: INTERNAL MEDICINE

## 2024-09-19 PROCEDURE — 20600001 HC STEP DOWN PRIVATE ROOM

## 2024-09-19 PROCEDURE — 38240 TRANSPLT ALLO HCT/DONOR: CPT

## 2024-09-19 PROCEDURE — 30243Y2 TRANSFUSION OF ALLOGENEIC RELATED HEMATOPOIETIC STEM CELLS INTO CENTRAL VEIN, PERCUTANEOUS APPROACH: ICD-10-PCS | Performed by: INTERNAL MEDICINE

## 2024-09-19 PROCEDURE — 38240 TRANSPLT ALLO HCT/DONOR: CPT | Mod: ,,, | Performed by: INTERNAL MEDICINE

## 2024-09-19 PROCEDURE — 38212 RBC DEPLETION OF HARVEST: CPT

## 2024-09-19 PROCEDURE — 38215 HARVEST STEM CELL CONCENTRTE: CPT

## 2024-09-19 PROCEDURE — 80053 COMPREHEN METABOLIC PANEL: CPT | Performed by: NURSE PRACTITIONER

## 2024-09-19 RX ORDER — FUROSEMIDE 10 MG/ML
100 INJECTION INTRAMUSCULAR; INTRAVENOUS ONCE AS NEEDED
Status: DISCONTINUED | OUTPATIENT
Start: 2024-09-19 | End: 2024-10-16 | Stop reason: HOSPADM

## 2024-09-19 RX ORDER — HYDROCODONE BITARTRATE AND ACETAMINOPHEN 500; 5 MG/1; MG/1
TABLET ORAL
Status: DISCONTINUED | OUTPATIENT
Start: 2024-09-19 | End: 2024-09-20

## 2024-09-19 RX ORDER — SODIUM CHLORIDE AND POTASSIUM CHLORIDE 150; 900 MG/100ML; MG/100ML
INJECTION, SOLUTION INTRAVENOUS CONTINUOUS
Status: DISCONTINUED | OUTPATIENT
Start: 2024-09-19 | End: 2024-09-20

## 2024-09-19 RX ORDER — EPINEPHRINE 1 MG/ML
0.5 INJECTION, SOLUTION, CONCENTRATE INTRAVENOUS ONCE AS NEEDED
Status: DISCONTINUED | OUTPATIENT
Start: 2024-09-19 | End: 2024-10-16 | Stop reason: HOSPADM

## 2024-09-19 RX ORDER — DIPHENHYDRAMINE HYDROCHLORIDE 50 MG/ML
50 INJECTION INTRAMUSCULAR; INTRAVENOUS ONCE AS NEEDED
Status: DISCONTINUED | OUTPATIENT
Start: 2024-09-19 | End: 2024-10-16 | Stop reason: HOSPADM

## 2024-09-19 RX ORDER — HYDROCODONE BITARTRATE AND ACETAMINOPHEN 500; 5 MG/1; MG/1
TABLET ORAL
Status: CANCELLED | OUTPATIENT
Start: 2024-09-19

## 2024-09-19 RX ORDER — DIPHENHYDRAMINE HYDROCHLORIDE 50 MG/ML
50 INJECTION INTRAMUSCULAR; INTRAVENOUS ONCE
Status: COMPLETED | OUTPATIENT
Start: 2024-09-19 | End: 2024-09-19

## 2024-09-19 RX ORDER — CLONIDINE HYDROCHLORIDE 0.1 MG/1
0.1 TABLET ORAL ONCE AS NEEDED
Status: DISCONTINUED | OUTPATIENT
Start: 2024-09-19 | End: 2024-10-16 | Stop reason: HOSPADM

## 2024-09-19 RX ORDER — LORAZEPAM 2 MG/ML
1 INJECTION INTRAMUSCULAR ONCE
Status: COMPLETED | OUTPATIENT
Start: 2024-09-19 | End: 2024-09-19

## 2024-09-19 RX ORDER — NITROGLYCERIN 0.4 MG/1
0.4 TABLET SUBLINGUAL ONCE AS NEEDED
Status: DISCONTINUED | OUTPATIENT
Start: 2024-09-19 | End: 2024-10-16 | Stop reason: HOSPADM

## 2024-09-19 RX ORDER — MEPERIDINE HYDROCHLORIDE 50 MG/ML
50 INJECTION INTRAMUSCULAR; INTRAVENOUS; SUBCUTANEOUS ONCE AS NEEDED
Status: COMPLETED | OUTPATIENT
Start: 2024-09-19 | End: 2024-09-19

## 2024-09-19 RX ADMIN — LORAZEPAM 1 MG: 2 INJECTION INTRAMUSCULAR; INTRAVENOUS at 07:09

## 2024-09-19 RX ADMIN — OLANZAPINE 5 MG: 2.5 TABLET, FILM COATED ORAL at 09:09

## 2024-09-19 RX ADMIN — SODIUM CHLORIDE AND POTASSIUM CHLORIDE 75 ML/HR: .9; .15 SOLUTION INTRAVENOUS at 01:09

## 2024-09-19 RX ADMIN — MEPERIDINE HYDROCHLORIDE 50 MG: 50 INJECTION INTRAMUSCULAR; INTRAVENOUS; SUBCUTANEOUS at 11:09

## 2024-09-19 RX ADMIN — Medication 1 DOSE: at 12:09

## 2024-09-19 RX ADMIN — ONDANSETRON 8 MG: 8 TABLET, ORALLY DISINTEGRATING ORAL at 09:09

## 2024-09-19 RX ADMIN — TRAZODONE HYDROCHLORIDE 50 MG: 50 TABLET ORAL at 09:09

## 2024-09-19 RX ADMIN — Medication 1 DOSE: at 09:09

## 2024-09-19 RX ADMIN — VANCOMYCIN HYDROCHLORIDE 125 MG: KIT at 09:09

## 2024-09-19 RX ADMIN — SODIUM CHLORIDE AND POTASSIUM CHLORIDE: .9; .15 SOLUTION INTRAVENOUS at 10:09

## 2024-09-19 RX ADMIN — ONDANSETRON 8 MG: 8 TABLET, ORALLY DISINTEGRATING ORAL at 12:09

## 2024-09-19 RX ADMIN — PANTOPRAZOLE SODIUM 40 MG: 40 TABLET, DELAYED RELEASE ORAL at 09:09

## 2024-09-19 RX ADMIN — Medication 400 MG: at 12:09

## 2024-09-19 RX ADMIN — GABAPENTIN 600 MG: 300 CAPSULE ORAL at 09:09

## 2024-09-19 RX ADMIN — DIPHENHYDRAMINE HYDROCHLORIDE 50 MG: 50 INJECTION, SOLUTION INTRAMUSCULAR; INTRAVENOUS at 07:09

## 2024-09-19 RX ADMIN — Medication 1 DOSE: at 05:09

## 2024-09-19 RX ADMIN — MICAFUNGIN SODIUM 100 MG: 100 INJECTION, POWDER, LYOPHILIZED, FOR SOLUTION INTRAVENOUS at 09:09

## 2024-09-19 RX ADMIN — LEVOFLOXACIN 500 MG: 500 TABLET, FILM COATED ORAL at 09:09

## 2024-09-19 RX ADMIN — ACYCLOVIR 800 MG: 200 CAPSULE ORAL at 09:09

## 2024-09-19 RX ADMIN — ONDANSETRON 8 MG: 8 TABLET, ORALLY DISINTEGRATING ORAL at 04:09

## 2024-09-19 RX ADMIN — Medication 400 MG: at 09:09

## 2024-09-19 RX ADMIN — URSODIOL 300 MG: 300 CAPSULE ORAL at 09:09

## 2024-09-19 RX ADMIN — SODIUM CHLORIDE AND POTASSIUM CHLORIDE: .9; .15 SOLUTION INTRAVENOUS at 06:09

## 2024-09-19 NOTE — PROGRESS NOTES
Janusz Ma - Oncology ([facility])  Hematology  Bone Marrow Transplant  Progress Note    Patient Name: Guillaume Salinas  Admission Date: 9/13/2024  Hospital Length of Stay: 6 days  Code Status: Full Code    Subjective:     Interval History: Day 0 for a  TBI PT Cy haplo (son) allogeneic BMT for MDS.  Will receive bone marrow today following harvest. Timing and number of cells TBD. Continues to tolerate chemo and TBI well.     Objective:     Vital Signs (Most Recent):  Temp: 98.6 °F (37 °C) (09/19/24 0758)  Pulse: 88 (09/19/24 0758)  Resp: 18 (09/19/24 0758)  BP: (!) 113/58 (09/19/24 0758)  SpO2: (!) 94 % (09/19/24 0758) Vital Signs (24h Range):  Temp:  [97.6 °F (36.4 °C)-100 °F (37.8 °C)] 98.6 °F (37 °C)  Pulse:  [] 88  Resp:  [16-19] 18  SpO2:  [93 %-98 %] 94 %  BP: ()/(56-75) 113/58     Weight: 75.8 kg (167 lb 1.7 oz)  Body mass index is 24.68 kg/m².  Body surface area is 1.92 meters squared.    ECOG SCORE           [unfilled]    Intake/Output - Last 3 Shifts         09/17 0700  09/18 0659 09/18 0700  09/19 0659 09/19 0700  09/20 0659    P.O. 1475 450     I.V. (mL/kg) 3371.8 (43.8) 2720.6 (35.9)     IV Piggyback 599.7 99.4     Total Intake(mL/kg) 5446.5 (70.7) 3270 (43.1)     Urine (mL/kg/hr) 4225 (2.3) 2050 (1.1)     Stool 0 0     Total Output 4225 2050     Net +1221.5 +1220            Urine Occurrence 1 x      Stool Occurrence 1 x 1 x              Physical Exam  Constitutional:       Appearance: He is well-developed.   HENT:      Head: Normocephalic and atraumatic.      Mouth/Throat:      Pharynx: No oropharyngeal exudate.   Eyes:      General:         Right eye: No discharge.         Left eye: No discharge.      Conjunctiva/sclera: Conjunctivae normal.      Pupils: Pupils are equal, round, and reactive to light.   Cardiovascular:      Rate and Rhythm: Normal rate and regular rhythm.      Heart sounds: Normal heart sounds. No murmur heard.  Pulmonary:      Effort: Pulmonary effort is normal.  No respiratory distress.      Breath sounds: Normal breath sounds. No wheezing or rales.   Abdominal:      General: Bowel sounds are normal. There is no distension.      Palpations: Abdomen is soft.      Tenderness: There is no abdominal tenderness.   Musculoskeletal:         General: No deformity. Normal range of motion.      Cervical back: Normal range of motion and neck supple.   Skin:     General: Skin is warm and dry.      Findings: No erythema or rash.      Comments: Right chest wall vas cath. Dressing c/d/i. No sign of infection to site.   Neurological:      Mental Status: He is alert and oriented to person, place, and time.   Psychiatric:         Behavior: Behavior normal.         Thought Content: Thought content normal.         Judgment: Judgment normal.            Significant Labs:   CBC:   Recent Labs   Lab 09/18/24 0426 09/19/24 0425   WBC 0.91* 0.18*   HGB 8.1* 7.0*   HCT 23.2* 20.9*   PLT 44* 29*    and CMP:   Recent Labs   Lab 09/18/24 0426 09/19/24 0425    141   K 4.6 4.5    110   CO2 26 25   * 96   BUN 10 12   CREATININE 0.7 0.8   CALCIUM 8.8 7.7*   PROT 5.4* 4.6*   ALBUMIN 3.0* 2.7*   BILITOT 0.5 0.5   ALKPHOS 69 56   AST 15 19   ALT 18 17   ANIONGAP 4* 6*       Diagnostic Results:  I have reviewed all pertinent imaging results/findings within the past 24 hours.  Assessment/Plan:     * Stem cell transplant candidate  - Patient of Dr. 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 Today is Day 0.  - Patient is B+. Donor is A+.  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 Patient will start letermovir ppx on 9/24/24.  - [place] is planned for today (9/19/24) at 11 am. Patient will receive fresh product later today.  - Of note, cilostazol may interact with tacro. (Currently on hold for plts < 50K).  - See treatment plan below:    Planned conditioning regimen:  Fludarabine on Day -5, -4, -3, and -2  Melphalan on Day -2  TBI on  Day -1     Planned  GVHD Prophylaxis:  Cyclophosphamide (with [place]na) on Days +3 and +4  Mycophenolate starting on Day +5  Tacrolimus starting on Day +5     Antimicrobial Prophylaxis:  Acyclovir starting on Day -5  Levofloxacin starting on Day -1  Micafungin on Day -1 through Day +4  Letermovir starting on Day +5 (if CMV PCR drawn on day 0 results negative)  Posaconazole starting on Day +5  Bactrim starting on Day +30     SOS/VOD Prophylaxis:  Ursodiol on Day -5 through Day +30      Growth Factor Support:  Neupogen starting on Day +5     Caregiver: wife   Post-transplant discharge plans: home        Myelodysplastic syndrome  From Dr Hickey's clinic note 8/5:  Stem cell transplant candidate: We discussed the role for allogeneic stem cell transplantation in this disease process as a potentially curative option. We had an extensive discussion about the rationale, logistics, risks, and benefits. We reviewed the requirement to stay in the [place] area for 100 days with a caregiver at all times. We discussed the risks, including infection, graft failure, organ toxicity, graft versus host disease, relapse of disease, and secondary cancers. We reviewed the need for long-term immunosuppression and need for close monitoring. HCT-CI of 1 (intermediate risk). We had a prolonged discussion today regarding his recent deconditioning, Cdiff, and underlying disease. He is now much improved since last seen, and is improving his strength since starting to work with PT. Diarrhea now controlled. We discussed that our plan to move forward with the transplant process.   Coordinator: Fay Stephens  Regimen:  + 2Gy TBI  Donor: son (haplo)   Graft source: BM  - Admitted 9/13/24 for a  TBI PT Cy haplo (son) allogeneic BMT    Pancytopenia  - Expected to worsen following chemotherapy  - Daily CBC while inpatient  - Transfuse for hgb <7 Plt  or <10K  - Continue antimicrobial ppx  - Holding cilostazol for plts < 50K    History of  Clostridioides difficile infection  - Continue oral vanc ppx per transplant protocol    Coronary artery disease involving native coronary artery of native heart without angina pectoris  - Holding home Coreg for hypotension. Resume as indicated.    Hypertension, essential  - Holding home Coreg for fluid responsive hypotension. Resume as indicated.    Insomnia  - Continue home PRN Trazodone    Neuropathy  - Continue home gabapentin        VTE Risk Mitigation (From admission, onward)           Ordered     heparin, porcine (PF) 100 unit/mL injection flush 300 Units  As needed (PRN)         09/13/24 9221                    Disposition: Inpatient for allogeneic SCT.     Luz Napoles, NP  Bone Marrow Transplant  Janusz freddy - Oncology ([facility])

## 2024-09-19 NOTE — ASSESSMENT & PLAN NOTE
- Patient of Dr. 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 Today is Day 0.  - Patient is B+. Donor is A+.  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 Patient will start letermovir ppx on 9/24/24.  - [place] is planned for today (9/19/24) at 11 am. Patient will receive fresh product later today.  - Of note, cilostazol may interact with tacro. (Currently on hold for plts < 50K).  - See treatment plan below:    Planned conditioning regimen:  Fludarabine on Day -5, -4, -3, and -2  Melphalan on Day -2  TBI on Day -1     Planned  GVHD Prophylaxis:  Cyclophosphamide (with Mesna) on Days +3 and +4  Mycophenolate starting on Day +5  Tacrolimus starting on Day +5     Antimicrobial Prophylaxis:  Acyclovir starting on Day -5  Levofloxacin starting on Day -1  Micafungin on Day -1 through Day +4  Letermovir starting on Day +5 (if CMV PCR drawn on day 0 results negative)  Posaconazole starting on Day +5  Bactrim starting on Day +30     SOS/VOD Prophylaxis:  Ursodiol on Day -5 through Day +30      Growth Factor Support:  Neupogen starting on Day +5     Caregiver: wife   Post-transplant discharge plans: home

## 2024-09-19 NOTE — PLAN OF CARE
Pt. Is day 0 for HaploSCT.  Pt. With received stem cells tonight.  Pt. with nonskid footwear on with bed in lowest position and locked with bed rails up x2.  Pt. ambulates independently and instructed to call if assistance is needed.  Pt. with call light within reach.  Pt. Received electrolyte  replacements PRN today.   Pt. with no complaints today.  Pt. With family at bedside.  Pt. With a good appetite. Will continue to monitor pt.

## 2024-09-19 NOTE — PLAN OF CARE
Day 0 Flu/Rosalee SCT. No acute events occurred during the shift. PRN  trazodone given. No complains of pain or nausea. NaCl w/ KCL 20 mEq @ 75 mL/hr. Pt ambulates in room and to bathroom independently. No difficulty voiding. VS have been stable throughout shift and pt is afebrile. Pt AOx4. POC reviewed with patient and his wife. Both verbalized an understanding. Questions encouraged and answered. Bed is in the lowest position and locked. Non skid socks worn. Call light within reach. Pt encouraged to call for assistance when needed

## 2024-09-19 NOTE — SUBJECTIVE & OBJECTIVE
Subjective:     Interval History: Day 0 for a  TBI PT Cy haplo (son) allogeneic BMT for MDS.  Will receive bone marrow today following harvest. Timing and number of cells TBD. Continues to tolerate chemo and TBI well.     Objective:     Vital Signs (Most Recent):  Temp: 98.6 °F (37 °C) (09/19/24 0758)  Pulse: 88 (09/19/24 0758)  Resp: 18 (09/19/24 0758)  BP: (!) 113/58 (09/19/24 0758)  SpO2: (!) 94 % (09/19/24 0758) Vital Signs (24h Range):  Temp:  [97.6 °F (36.4 °C)-100 °F (37.8 °C)] 98.6 °F (37 °C)  Pulse:  [] 88  Resp:  [16-19] 18  SpO2:  [93 %-98 %] 94 %  BP: ()/(56-75) 113/58     Weight: 75.8 kg (167 lb 1.7 oz)  Body mass index is 24.68 kg/m².  Body surface area is 1.92 meters squared.    ECOG SCORE           [unfilled]    Intake/Output - Last 3 Shifts         09/17 0700 09/18 0659 09/18 0700 09/19 0659 09/19 0700 09/20 0659    P.O. 1475 450     I.V. (mL/kg) 3371.8 (43.8) 2720.6 (35.9)     IV Piggyback 599.7 99.4     Total Intake(mL/kg) 5446.5 (70.7) 3270 (43.1)     Urine (mL/kg/hr) 4225 (2.3) 2050 (1.1)     Stool 0 0     Total Output 4225 2050     Net +1221.5 +1220            Urine Occurrence 1 x      Stool Occurrence 1 x 1 x              Physical Exam  Constitutional:       Appearance: He is well-developed.   HENT:      Head: Normocephalic and atraumatic.      Mouth/Throat:      Pharynx: No oropharyngeal exudate.   Eyes:      General:         Right eye: No discharge.         Left eye: No discharge.      Conjunctiva/sclera: Conjunctivae normal.      Pupils: Pupils are equal, round, and reactive to light.   Cardiovascular:      Rate and Rhythm: Normal rate and regular rhythm.      Heart sounds: Normal heart sounds. No murmur heard.  Pulmonary:      Effort: Pulmonary effort is normal. No respiratory distress.      Breath sounds: Normal breath sounds. No wheezing or rales.   Abdominal:      General: Bowel sounds are normal. There is no distension.      Palpations: Abdomen is soft.       Tenderness: There is no abdominal tenderness.   Musculoskeletal:         General: No deformity. Normal range of motion.      Cervical back: Normal range of motion and neck supple.   Skin:     General: Skin is warm and dry.      Findings: No erythema or rash.      Comments: Right chest wall vas cath. Dressing c/d/i. No sign of infection to site.   Neurological:      Mental Status: He is alert and oriented to person, place, and time.   Psychiatric:         Behavior: Behavior normal.         Thought Content: Thought content normal.         Judgment: Judgment normal.            Significant Labs:   CBC:   Recent Labs   Lab 09/18/24 0426 09/19/24 0425   WBC 0.91* 0.18*   HGB 8.1* 7.0*   HCT 23.2* 20.9*   PLT 44* 29*    and CMP:   Recent Labs   Lab 09/18/24 0426 09/19/24 0425    141   K 4.6 4.5    110   CO2 26 25   * 96   BUN 10 12   CREATININE 0.7 0.8   CALCIUM 8.8 7.7*   PROT 5.4* 4.6*   ALBUMIN 3.0* 2.7*   BILITOT 0.5 0.5   ALKPHOS 69 56   AST 15 19   ALT 18 17   ANIONGAP 4* 6*       Diagnostic Results:  I have reviewed all pertinent imaging results/findings within the past 24 hours.

## 2024-09-19 NOTE — NURSING
Nurses Note -- 4 Eyes      9/19/2024   6:49 AM      Skin assessed during: Admit      [x] No Altered Skin Integrity Present    []Prevention Measures Documented      [] Yes- Altered Skin Integrity Present or Discovered   [] LDA Added if Not in Epic (Describe Wound)   [] New Altered Skin Integrity was Present on Admit and Documented in LDA   [] Wound Image Taken    Wound Care Consulted? No    Attending Nurse:  Sarahi Pavon RN/Staff Member:   Darling

## 2024-09-19 NOTE — ASSESSMENT & PLAN NOTE
From Dr Hickey's clinic note 8/5:  Stem cell transplant candidate: We discussed the role for allogeneic stem cell transplantation in this disease process as a potentially curative option. We had an extensive discussion about the rationale, logistics, risks, and benefits. We reviewed the requirement to stay in the [place] area for 100 days with a caregiver at all times. We discussed the risks, including infection, graft failure, organ toxicity, graft versus host disease, relapse of disease, and secondary cancers. We reviewed the need for long-term immunosuppression and need for close monitoring. HCT-CI of 1 (intermediate risk). We had a prolonged discussion today regarding his recent deconditioning, Cdiff, and underlying disease. He is now much improved since last seen, and is improving his strength since starting to work with PT. Diarrhea now controlled. We discussed that our plan to move forward with the transplant process.   Coordinator: Fay Stephens  Regimen:  + 2Gy TBI  Donor: son (haplo)   Graft source: BM  - Admitted 9/13/24 for a  TBI PT Cy haplo (son) allogeneic BMT

## 2024-09-20 PROBLEM — D70.9 NEUTROPENIC FEVER: Status: ACTIVE | Noted: 2024-09-20

## 2024-09-20 PROBLEM — R50.81 NEUTROPENIC FEVER: Status: ACTIVE | Noted: 2024-09-20

## 2024-09-20 LAB
ABO + RH BLD: NORMAL
ADENOVIRUS: NOT DETECTED
ALBUMIN SERPL BCP-MCNC: 2.7 G/DL (ref 3.5–5.2)
ALP SERPL-CCNC: 55 U/L (ref 55–135)
ALT SERPL W/O P-5'-P-CCNC: 18 U/L (ref 10–44)
ANION GAP SERPL CALC-SCNC: 6 MMOL/L (ref 8–16)
ANISOCYTOSIS BLD QL SMEAR: SLIGHT
AST SERPL-CCNC: 26 U/L (ref 10–40)
BASOPHILS NFR BLD: 0 % (ref 0–1.9)
BILIRUB SERPL-MCNC: 0.4 MG/DL (ref 0.1–1)
BILIRUB UR QL STRIP: NEGATIVE
BILIRUB UR QL STRIP: NEGATIVE
BLD GP AB SCN CELLS X3 SERPL QL: NORMAL
BLD PROD TYP BPU: NORMAL
BLOOD UNIT EXPIRATION DATE: NORMAL
BLOOD UNIT TYPE CODE: 7300
BLOOD UNIT TYPE: NORMAL
BORDETELLA PARAPERTUSSIS (IS1001): NOT DETECTED
BORDETELLA PERTUSSIS (PTXP): NOT DETECTED
BUN SERPL-MCNC: 14 MG/DL (ref 8–23)
CALCIUM SERPL-MCNC: 8.3 MG/DL (ref 8.7–10.5)
CHLAMYDIA PNEUMONIAE: NOT DETECTED
CHLORIDE SERPL-SCNC: 109 MMOL/L (ref 95–110)
CLARITY UR REFRACT.AUTO: CLEAR
CLARITY UR REFRACT.AUTO: CLEAR
CO2 SERPL-SCNC: 24 MMOL/L (ref 23–29)
CODING SYSTEM: NORMAL
COLOR UR AUTO: COLORLESS
COLOR UR AUTO: COLORLESS
CORONAVIRUS 229E, COMMON COLD VIRUS: NOT DETECTED
CORONAVIRUS HKU1, COMMON COLD VIRUS: NOT DETECTED
CORONAVIRUS NL63, COMMON COLD VIRUS: NOT DETECTED
CORONAVIRUS OC43, COMMON COLD VIRUS: NOT DETECTED
CREAT SERPL-MCNC: 0.8 MG/DL (ref 0.5–1.4)
CROSSMATCH INTERPRETATION: NORMAL
DIFFERENTIAL METHOD BLD: ABNORMAL
DISPENSE STATUS: NORMAL
EOSINOPHIL NFR BLD: 0 % (ref 0–8)
ERYTHROCYTE [DISTWIDTH] IN BLOOD BY AUTOMATED COUNT: 14.2 % (ref 11.5–14.5)
EST. GFR  (NO RACE VARIABLE): >60 ML/MIN/1.73 M^2
FLUBV RNA NPH QL NAA+NON-PROBE: NOT DETECTED
GLUCOSE SERPL-MCNC: 146 MG/DL (ref 70–110)
GLUCOSE UR QL STRIP: NEGATIVE
GLUCOSE UR QL STRIP: NEGATIVE
HCT VFR BLD AUTO: 19.5 % (ref 40–54)
HGB BLD-MCNC: 6.4 G/DL (ref 14–18)
HGB UR QL STRIP: NEGATIVE
HGB UR QL STRIP: NEGATIVE
HPIV1 RNA NPH QL NAA+NON-PROBE: NOT DETECTED
HPIV2 RNA NPH QL NAA+NON-PROBE: NOT DETECTED
HPIV3 RNA NPH QL NAA+NON-PROBE: NOT DETECTED
HPIV4 RNA NPH QL NAA+NON-PROBE: NOT DETECTED
HUMAN METAPNEUMOVIRUS: NOT DETECTED
IMM GRANULOCYTES # BLD AUTO: ABNORMAL K/UL (ref 0–0.04)
IMM GRANULOCYTES NFR BLD AUTO: ABNORMAL % (ref 0–0.5)
INFLUENZA A (SUBTYPES H1,H1-2009,H3): NOT DETECTED
KETONES UR QL STRIP: NEGATIVE
KETONES UR QL STRIP: NEGATIVE
LEUKOCYTE ESTERASE UR QL STRIP: NEGATIVE
LEUKOCYTE ESTERASE UR QL STRIP: NEGATIVE
LYMPHOCYTES NFR BLD: 4.5 % (ref 18–48)
MAGNESIUM SERPL-MCNC: 1.7 MG/DL (ref 1.6–2.6)
MCH RBC QN AUTO: 30.9 PG (ref 27–31)
MCHC RBC AUTO-ENTMCNC: 32.8 G/DL (ref 32–36)
MCV RBC AUTO: 94 FL (ref 82–98)
MONOCYTES NFR BLD: 0 % (ref 4–15)
MYCOPLASMA PNEUMONIAE: NOT DETECTED
NEUTROPHILS NFR BLD: 95.5 % (ref 38–73)
NITRITE UR QL STRIP: NEGATIVE
NITRITE UR QL STRIP: NEGATIVE
NRBC BLD-RTO: 0 /100 WBC
NUM UNITS TRANS PACKED RBC: NORMAL
OVALOCYTES BLD QL SMEAR: ABNORMAL
PH UR STRIP: 6 [PH] (ref 5–8)
PH UR STRIP: 6 [PH] (ref 5–8)
PHOSPHATE SERPL-MCNC: 1.6 MG/DL (ref 2.7–4.5)
PLATELET # BLD AUTO: 36 K/UL (ref 150–450)
PLATELET BLD QL SMEAR: ABNORMAL
PMV BLD AUTO: 9.9 FL (ref 9.2–12.9)
POIKILOCYTOSIS BLD QL SMEAR: SLIGHT
POLYCHROMASIA BLD QL SMEAR: ABNORMAL
POTASSIUM SERPL-SCNC: 3.7 MMOL/L (ref 3.5–5.1)
PROT SERPL-MCNC: 4.7 G/DL (ref 6–8.4)
PROT UR QL STRIP: NEGATIVE
PROT UR QL STRIP: NEGATIVE
RBC # BLD AUTO: 2.07 M/UL (ref 4.6–6.2)
RESPIRATORY INFECTION PANEL SOURCE: NORMAL
RSV RNA NPH QL NAA+NON-PROBE: NOT DETECTED
RV+EV RNA NPH QL NAA+NON-PROBE: NOT DETECTED
SARS-COV-2 RNA RESP QL NAA+PROBE: NOT DETECTED
SODIUM SERPL-SCNC: 139 MMOL/L (ref 136–145)
SP GR UR STRIP: 1 (ref 1–1.03)
SP GR UR STRIP: 1.01 (ref 1–1.03)
SPECIMEN OUTDATE: NORMAL
URN SPEC COLLECT METH UR: ABNORMAL
URN SPEC COLLECT METH UR: ABNORMAL
WBC # BLD AUTO: 0.06 K/UL (ref 3.9–12.7)

## 2024-09-20 PROCEDURE — 86901 BLOOD TYPING SEROLOGIC RH(D): CPT | Performed by: INTERNAL MEDICINE

## 2024-09-20 PROCEDURE — P9040 RBC LEUKOREDUCED IRRADIATED: HCPCS

## 2024-09-20 PROCEDURE — 83735 ASSAY OF MAGNESIUM: CPT | Performed by: NURSE PRACTITIONER

## 2024-09-20 PROCEDURE — 87581 M.PNEUMON DNA AMP PROBE: CPT | Performed by: INTERNAL MEDICINE

## 2024-09-20 PROCEDURE — 85027 COMPLETE CBC AUTOMATED: CPT | Performed by: NURSE PRACTITIONER

## 2024-09-20 PROCEDURE — 63600175 PHARM REV CODE 636 W HCPCS: Performed by: INTERNAL MEDICINE

## 2024-09-20 PROCEDURE — 87040 BLOOD CULTURE FOR BACTERIA: CPT | Mod: 59 | Performed by: STUDENT IN AN ORGANIZED HEALTH CARE EDUCATION/TRAINING PROGRAM

## 2024-09-20 PROCEDURE — 63600175 PHARM REV CODE 636 W HCPCS: Performed by: STUDENT IN AN ORGANIZED HEALTH CARE EDUCATION/TRAINING PROGRAM

## 2024-09-20 PROCEDURE — 85007 BL SMEAR W/DIFF WBC COUNT: CPT | Performed by: NURSE PRACTITIONER

## 2024-09-20 PROCEDURE — 25000003 PHARM REV CODE 250: Performed by: STUDENT IN AN ORGANIZED HEALTH CARE EDUCATION/TRAINING PROGRAM

## 2024-09-20 PROCEDURE — 36430 TRANSFUSION BLD/BLD COMPNT: CPT

## 2024-09-20 PROCEDURE — 84100 ASSAY OF PHOSPHORUS: CPT | Performed by: NURSE PRACTITIONER

## 2024-09-20 PROCEDURE — 81003 URINALYSIS AUTO W/O SCOPE: CPT | Performed by: STUDENT IN AN ORGANIZED HEALTH CARE EDUCATION/TRAINING PROGRAM

## 2024-09-20 PROCEDURE — 86920 COMPATIBILITY TEST SPIN: CPT

## 2024-09-20 PROCEDURE — 20600001 HC STEP DOWN PRIVATE ROOM

## 2024-09-20 PROCEDURE — 25000003 PHARM REV CODE 250: Performed by: NURSE PRACTITIONER

## 2024-09-20 PROCEDURE — 87633 RESP VIRUS 12-25 TARGETS: CPT | Performed by: INTERNAL MEDICINE

## 2024-09-20 PROCEDURE — 25000003 PHARM REV CODE 250: Performed by: INTERNAL MEDICINE

## 2024-09-20 PROCEDURE — 87486 CHLMYD PNEUM DNA AMP PROBE: CPT | Performed by: INTERNAL MEDICINE

## 2024-09-20 PROCEDURE — 86900 BLOOD TYPING SEROLOGIC ABO: CPT | Performed by: INTERNAL MEDICINE

## 2024-09-20 PROCEDURE — 99233 SBSQ HOSP IP/OBS HIGH 50: CPT | Mod: GC,,, | Performed by: INTERNAL MEDICINE

## 2024-09-20 PROCEDURE — 80053 COMPREHEN METABOLIC PANEL: CPT | Performed by: NURSE PRACTITIONER

## 2024-09-20 PROCEDURE — 86850 RBC ANTIBODY SCREEN: CPT | Performed by: INTERNAL MEDICINE

## 2024-09-20 PROCEDURE — 97535 SELF CARE MNGMENT TRAINING: CPT | Mod: CO

## 2024-09-20 RX ORDER — ACETAMINOPHEN 325 MG/1
650 TABLET ORAL ONCE
Status: COMPLETED | OUTPATIENT
Start: 2024-09-20 | End: 2024-09-20

## 2024-09-20 RX ORDER — MYCOPHENOLATE MOFETIL 250 MG/1
1000 CAPSULE ORAL 3 TIMES DAILY
Qty: 240 CAPSULE | Refills: 0 | Status: SHIPPED | OUTPATIENT
Start: 2024-10-04 | End: 2024-10-24

## 2024-09-20 RX ORDER — HYDROCODONE BITARTRATE AND ACETAMINOPHEN 500; 5 MG/1; MG/1
TABLET ORAL
Status: DISCONTINUED | OUTPATIENT
Start: 2024-09-20 | End: 2024-09-24

## 2024-09-20 RX ORDER — TACROLIMUS 0.5 MG/1
CAPSULE ORAL
Qty: 120 CAPSULE | Refills: 11 | Status: SHIPPED | OUTPATIENT
Start: 2024-10-04 | End: 2024-10-16

## 2024-09-20 RX ORDER — ACETAMINOPHEN 325 MG/1
650 TABLET ORAL EVERY 6 HOURS PRN
Status: DISCONTINUED | OUTPATIENT
Start: 2024-09-20 | End: 2024-09-24

## 2024-09-20 RX ORDER — SULFAMETHOXAZOLE AND TRIMETHOPRIM 800; 160 MG/1; MG/1
1 TABLET ORAL
Qty: 12 TABLET | Refills: 11 | Status: SHIPPED | OUTPATIENT
Start: 2024-10-21

## 2024-09-20 RX ORDER — URSODIOL 300 MG/1
300 CAPSULE ORAL 2 TIMES DAILY
Qty: 30 CAPSULE | Refills: 0 | Status: SHIPPED | OUTPATIENT
Start: 2024-10-04 | End: 2024-10-19

## 2024-09-20 RX ORDER — ACYCLOVIR 800 MG/1
800 TABLET ORAL 2 TIMES DAILY
Qty: 60 TABLET | Refills: 11 | Status: SHIPPED | OUTPATIENT
Start: 2024-09-20 | End: 2025-09-20

## 2024-09-20 RX ADMIN — Medication 400 MG: at 11:09

## 2024-09-20 RX ADMIN — DIBASIC SODIUM PHOSPHATE, MONOBASIC POTASSIUM PHOSPHATE AND MONOBASIC SODIUM PHOSPHATE 2 TABLET: 852; 155; 130 TABLET ORAL at 11:09

## 2024-09-20 RX ADMIN — URSODIOL 300 MG: 300 CAPSULE ORAL at 09:09

## 2024-09-20 RX ADMIN — MICAFUNGIN SODIUM 100 MG: 100 INJECTION, POWDER, LYOPHILIZED, FOR SOLUTION INTRAVENOUS at 10:09

## 2024-09-20 RX ADMIN — GABAPENTIN 600 MG: 300 CAPSULE ORAL at 10:09

## 2024-09-20 RX ADMIN — ACYCLOVIR 800 MG: 200 CAPSULE ORAL at 10:09

## 2024-09-20 RX ADMIN — PANTOPRAZOLE SODIUM 40 MG: 40 TABLET, DELAYED RELEASE ORAL at 10:09

## 2024-09-20 RX ADMIN — SODIUM CHLORIDE AND POTASSIUM CHLORIDE 75 ML/HR: .9; .15 SOLUTION INTRAVENOUS at 09:09

## 2024-09-20 RX ADMIN — ACETAMINOPHEN 650 MG: 325 TABLET ORAL at 11:09

## 2024-09-20 RX ADMIN — DIBASIC SODIUM PHOSPHATE, MONOBASIC POTASSIUM PHOSPHATE AND MONOBASIC SODIUM PHOSPHATE 2 TABLET: 852; 155; 130 TABLET ORAL at 06:09

## 2024-09-20 RX ADMIN — CEFEPIME 2 G: 2 INJECTION, POWDER, FOR SOLUTION INTRAVENOUS at 11:09

## 2024-09-20 RX ADMIN — Medication 1 DOSE: at 09:09

## 2024-09-20 RX ADMIN — URSODIOL 300 MG: 300 CAPSULE ORAL at 10:09

## 2024-09-20 RX ADMIN — ACETAMINOPHEN 650 MG: 325 TABLET ORAL at 02:09

## 2024-09-20 RX ADMIN — TRAZODONE HYDROCHLORIDE 50 MG: 50 TABLET ORAL at 09:09

## 2024-09-20 RX ADMIN — VANCOMYCIN HYDROCHLORIDE 125 MG: KIT at 09:09

## 2024-09-20 RX ADMIN — ACYCLOVIR 800 MG: 200 CAPSULE ORAL at 09:09

## 2024-09-20 RX ADMIN — CEFEPIME 2 G: 2 INJECTION, POWDER, FOR SOLUTION INTRAVENOUS at 02:09

## 2024-09-20 RX ADMIN — ACETAMINOPHEN 650 MG: 325 TABLET ORAL at 01:09

## 2024-09-20 RX ADMIN — GABAPENTIN 600 MG: 300 CAPSULE ORAL at 09:09

## 2024-09-20 RX ADMIN — Medication 400 MG: at 06:09

## 2024-09-20 RX ADMIN — CEFEPIME 2 G: 2 INJECTION, POWDER, FOR SOLUTION INTRAVENOUS at 06:09

## 2024-09-20 RX ADMIN — DIBASIC SODIUM PHOSPHATE, MONOBASIC POTASSIUM PHOSPHATE AND MONOBASIC SODIUM PHOSPHATE 2 TABLET: 852; 155; 130 TABLET ORAL at 03:09

## 2024-09-20 RX ADMIN — SODIUM CHLORIDE AND POTASSIUM CHLORIDE 75 ML/HR: .9; .15 SOLUTION INTRAVENOUS at 10:09

## 2024-09-20 RX ADMIN — VANCOMYCIN HYDROCHLORIDE 125 MG: KIT at 10:09

## 2024-09-20 RX ADMIN — Medication 1 DOSE: at 05:09

## 2024-09-20 RX ADMIN — POTASSIUM CHLORIDE 20 MEQ: 1500 TABLET, EXTENDED RELEASE ORAL at 06:09

## 2024-09-20 RX ADMIN — DIPHENHYDRAMINE HYDROCHLORIDE 50 MG: 25 CAPSULE ORAL at 03:09

## 2024-09-20 NOTE — PROGRESS NOTES
"Janusz Ma - Oncology ([facility])  Adult Nutrition  Progress Note    SUMMARY     Recommendation/Intervention:   Continue current regular diet as tolerated, encourage PO intake  Boost Plus BID to promote adequate kcal/protein consumption  RD to monitor and follow-up as needed    Goals: Meet % EEN, EPN by RD f/u date  Nutrition Goal Status: progressing towards goal  Communication of RD Recs: other (comment) (POC)    Assessment and Plan    Nutrition Problem:  Increased nutrient needs     Related to (etiology):   Physiological causes      Signs and Symptoms (as evidenced by):   Upcoming SCT     Interventions(treatment strategy):  Collaboration of nutrition care w/ other providers     Nutrition Diagnosis Status:   Continues    Reason for Assessment    Reason For Assessment: RD follow-up  Diagnosis: other (see comments) (Stem cell transplant candidate)  Relevant Medical History: HTN  Interdisciplinary Rounds: did not attend  General Information Comments: Pt followed by RD team. Day +1  Flu/Rosalee Haplo SCT. No nausea. 50-75% recent PO intake at meals. One soft, loose BM since procedure per RN.  Nutrition Discharge Planning: Post transplant nutrition education provided on 9/14. Food safety/drug interactions emphasized. General healthy diet recommended. RD name/contact information, education material left. No other needs identified.     Nutrition Risk Screen    Nutrition Risk Screen: no indicators present    Nutrition/Diet History    Patient Reported Diet/Restrictions/Preferences: general  Spiritual, Cultural Beliefs, Amish Practices, Values that Affect Care: no  Food Allergies: NKFA  Factors Affecting Nutritional Intake: None identified at this time    Anthropometrics    Temp: 98.7 °F (37.1 °C)  Height Method: Stated  Height: 5' 9" (175.3 cm)  Height (inches): 69 in  Weight Method: Bed Scale  Weight: 77.3 kg (170 lb 6.7 oz) (pt also had additional blankets in bed with him; unstable gait so pt can not stand on " scale)  Weight (lb): 170.42 lb  Ideal Body Weight (IBW), Male: 160 lb  % Ideal Body Weight, Male (lb): 102.24 %  BMI (Calculated): 25.2  BMI Grade: 18.5-24.9 - normal  7 lb weight gain since admit    Lab/Procedures/Meds    Pertinent Labs Reviewed: reviewed  Pertinent Labs Comments: Hgb: 6.4, Hct: 19.5, Glucose: 146, Calcium: 8.3, Phosphorus: 1.6  Pertinent Medications Reviewed: reviewed   acyclovir  800 mg Oral BID    [START ON 9/22/2024] aprepitant  130 mg Intravenous Q24H    ceFEPime IV (PEDS and ADULTS)  2 g Intravenous Q8H    [START ON 9/22/2024] cycloPHOSphamide 3,500 mg in 0.9% NaCl 582.5 mL chemo infusion  3,500 mg Intravenous Q24H    [START ON 9/24/2024] filgrastim  300 mcg Subcutaneous Daily    gabapentin  600 mg Oral BID    [START ON 9/24/2024] letermovir  480 mg Oral Daily    [START ON 9/22/2024] LORazepam  1 mg Oral Q24H    [START ON 9/22/2024] mesna (MESNEX) 707 mg in 0.9% NaCl 70.07 mL infusion  10 mg/kg (Treatment Plan Ideal) Intravenous Q24H    [START ON 9/22/2024] mesna (MESNEX) 707 mg in 0.9% NaCl 70.07 mL infusion  10 mg/kg (Treatment Plan Ideal) Intravenous Q24H    [START ON 9/22/2024] mesna (MESNEX) 707 mg in 0.9% NaCl 70.07 mL infusion  10 mg/kg (Treatment Plan Ideal) Intravenous Q24H    [START ON 9/22/2024] mesna (MESNEX) 707 mg in 0.9% NaCl 70.07 mL infusion  10 mg/kg (Treatment Plan Ideal) Intravenous Q24H    micafungin  100 mg Intravenous Q24H    [START ON 9/24/2024] mycophenolate  1,000 mg Oral TID    [START ON 9/22/2024] ondansetron  8 mg Intravenous Q8H    pantoprazole  40 mg Oral Daily    [START ON 9/24/2024] posaconazole  300 mg Oral BID    [START ON 9/26/2024] posaconazole  300 mg Oral Daily    sodium bicarb-sodium chloride  1 Dose Swish & Spit QID    [START ON 9/22/2024] sodium chloride 0.9%  500 mL Intravenous Q24H    [START ON 9/22/2024] sodium chloride 0.9%  500 mL Intravenous Q24H    [START ON 10/19/2024] sulfamethoxazole-trimethoprim 800-160mg  1 tablet Oral Every Mon, Wed, Fri     [START ON 9/24/2024] tacrolimus  1 mg Oral Daily AM    [START ON 9/24/2024] tacrolimus  1 mg Oral Daily PM    ursodioL  300 mg Oral BID    vancomycin  125 mg Oral BID     Estimated/Assessed Needs    Weight Used For Calorie Calculations: 74.2 kg (163 lb 9.3 oz)  Energy Calorie Requirements (kcal): 1871 kcal/d  Energy Need Method: [place]-St Jeor (1.25 PAL)  Protein Requirements: 89 g/d (1.2 g/kg)  Weight Used For Protein Calculations: 74.2 kg (163 lb 9.3 oz)     Estimated Fluid Requirement Method: other (see comments) (Per MD)  RDA Method (mL): 1871     Nutrition Prescription Ordered    Current Diet Order: Regular    Evaluation of Received Nutrient/Fluid Intake    I/O: + 3.9 L since admit  Energy Calories Required: not meeting needs  Protein Required: not meeting needs  Fluid Required: other (see comments) (As per MD)  Comments: LBM 9/19  Tolerance: tolerating  % Intake of Estimated Energy Needs: 50 - 75 %  % Meal Intake: 50 - 75 %    Nutrition Risk    Level of Risk/Frequency of Follow-up:  (1x/week)     Monitor and Evaluation    Food and Nutrient Intake: food and beverage intake, energy intake  Food and Nutrient Adminstration: diet order  Physical Activity and Function: nutrition-related ADLs and IADLs  Anthropometric Measurements: weight change, weight  Biochemical Data, Medical Tests and Procedures: gastrointestinal profile, inflammatory profile, lipid profile, glucose/endocrine profile, electrolyte and renal panel  Nutrition-Focused Physical Findings: overall appearance     Nutrition Follow-Up    RD Follow-up?: Yes

## 2024-09-20 NOTE — PROGRESS NOTES
09/20/24 1305   Vital Signs   Temp (!) 101.2 °F (38.4 °C)   Temp Source Oral   Pulse 103   SpO2 97 %   Biobeat   Heart Rate/Pulse Average 108     Elevated temp reported to Eduardo Smith MD. Tylenol ordered.

## 2024-09-20 NOTE — PROGRESS NOTES
Janusz Ma - Oncology ([facility])  Hematology  Bone Marrow Transplant  Progress Note    Patient Name: Guillaume Salinas  Admission Date: 9/13/2024  Hospital Length of Stay: 7 days  Code Status: Full Code    Subjective:     Interval History: Day +1 for a  TBI PT Cy haplo (son) allogeneic BMT for MDS.  Received 3.55 x 10^6 CD34/kg last night in a fresh transplant. Early morning temp spike to 102.9, infectious workup completed. Conversation today conducted entirely through ipad .  1 loose stool followed by a formed bowel movement.    Objective:     Vital Signs (Most Recent):  Temp: (!) 101.2 °F (38.4 °C) (09/20/24 1305)  Pulse: 103 (09/20/24 1305)  Resp: 18 (09/20/24 1132)  BP: (!) 102/51 (09/20/24 1145)  SpO2: 97 % (09/20/24 1305) Vital Signs (24h Range):  Temp:  [97.3 °F (36.3 °C)-102.9 °F (39.4 °C)] 101.2 °F (38.4 °C)  Pulse:  [] 103  Resp:  [12-26] 18  SpO2:  [90 %-99 %] 97 %  BP: ()/(51-84) 102/51     Weight: 77.3 kg (170 lb 6.7 oz) (pt also had additional blankets in bed with him; unstable gait so pt can not stand on scale)  Body mass index is 25.17 kg/m².  Body surface area is 1.94 meters squared.    ECOG SCORE           [unfilled]    Intake/Output - Last 3 Shifts         09/18 0700 09/19 0659 09/19 0700 09/20 0659 09/20 0700 09/21 0659    P.O. 450 960 300    I.V. (mL/kg) 2720.6 (35.9) 1331.1 (17.2)     IV Piggyback 99.4 99.6     Total Intake(mL/kg) 3270 (43.1) 2390.7 (30.9) 300 (3.9)    Urine (mL/kg/hr) 2050 (1.1) 2800 (1.5)     Stool 0 0     Total Output 2050 2800     Net +1220 -409.3 +300           Urine Occurrence  2 x     Stool Occurrence 1 x 0 x              Physical Exam  Constitutional:       Appearance: He is well-developed.   HENT:      Head: Normocephalic and atraumatic.      Mouth/Throat:      Pharynx: No oropharyngeal exudate.   Eyes:      General:         Right eye: No discharge.         Left eye: No discharge.      Conjunctiva/sclera: Conjunctivae normal.       Pupils: Pupils are equal, round, and reactive to light.   Cardiovascular:      Rate and Rhythm: Normal rate and regular rhythm.      Heart sounds: Normal heart sounds. No murmur heard.  Pulmonary:      Effort: Pulmonary effort is normal. No respiratory distress.      Breath sounds: Normal breath sounds. No wheezing or rales.   Abdominal:      General: Bowel sounds are normal. There is no distension.      Palpations: Abdomen is soft.      Tenderness: There is no abdominal tenderness.   Musculoskeletal:         General: No deformity. Normal range of motion.      Cervical back: Normal range of motion and neck supple.   Skin:     General: Skin is warm and dry.      Findings: No erythema or rash.      Comments: Right chest wall vas cath. Dressing c/d/i. No sign of infection to site.   Neurological:      Mental Status: He is alert and oriented to person, place, and time.   Psychiatric:         Behavior: Behavior normal.         Thought Content: Thought content normal.         Judgment: Judgment normal.            Significant Labs:   CBC:   Recent Labs   Lab 09/19/24  0425 09/20/24  0452   WBC 0.18* 0.06*   HGB 7.0* 6.4*   HCT 20.9* 19.5*   PLT 29* 36*    and CMP:   Recent Labs   Lab 09/19/24  0425 09/20/24  0452    139   K 4.5 3.7    109   CO2 25 24   GLU 96 146*   BUN 12 14   CREATININE 0.8 0.8   CALCIUM 7.7* 8.3*   PROT 4.6* 4.7*   ALBUMIN 2.7* 2.7*   BILITOT 0.5 0.4   ALKPHOS 56 55   AST 19 26   ALT 17 18   ANIONGAP 6* 6*       Diagnostic Results:  I have reviewed all pertinent imaging results/findings within the past 24 hours.  Assessment/Plan:     * Stem cell transplant candidate  - Patient of Dr. 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 Today is Day +1.  - Patient is B+. Donor is A+.  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 Patient will start letermovir ppx on 9/24/24.  - Received fresh product last night [9/19/24] with a CD34 dose of 3.55  x10^6.  - Of note, cilostazol may interact with tacro. (Currently on hold for plts < 50K).  - See treatment plan below:    Planned conditioning regimen:  Fludarabine on Day -5, -4, -3, and -2  Melphalan on Day -2  TBI on Day -1     Planned  GVHD Prophylaxis:  Cyclophosphamide (with Mesna) on Days +3 and +4  Mycophenolate starting on Day +5  Tacrolimus starting on Day +5     Antimicrobial Prophylaxis:  Acyclovir starting on Day -5  Levofloxacin starting on Day -1  Micafungin on Day -1 through Day +4  Letermovir starting on Day +5 (if CMV PCR drawn on day 0 results negative)  Posaconazole starting on Day +5  Bactrim starting on Day +30     SOS/VOD Prophylaxis:  Ursodiol on Day -5 through Day +30      Growth Factor Support:  Neupogen starting on Day +5     Caregiver: wife   Post-transplant discharge plans: home        Neutropenic fever  --T max of 102/9 early morning of 9/20/24  --Infectious workup ongoing  --Cefepime started    Insomnia  - Continue home PRN Trazodone    History of Clostridioides difficile infection  - Continue oral vanc ppx per transplant protocol    Neuropathy  - Continue home gabapentin    Pancytopenia  - Expected to worsen following chemotherapy  - Daily CBC while inpatient  - Transfuse for hgb <7 Plt  or <10K  - Continue antimicrobial ppx  - Holding cilostazol for plts < 50K    Myelodysplastic syndrome  From Dr Hickey's clinic note 8/5:  Stem cell transplant candidate: We discussed the role for allogeneic stem cell transplantation in this disease process as a potentially curative option. We had an extensive discussion about the rationale, logistics, risks, and benefits. We reviewed the requirement to stay in the [place] area for 100 days with a caregiver at all times. We discussed the risks, including infection, graft failure, organ toxicity, graft versus host disease, relapse of disease, and secondary cancers. We reviewed the need for long-term immunosuppression and need for close  monitoring. HCT-CI of 1 (intermediate risk). We had a prolonged discussion today regarding his recent deconditioning, Cdiff, and underlying disease. He is now much improved since last seen, and is improving his strength since starting to work with PT. Diarrhea now controlled. We discussed that our plan to move forward with the transplant process.   Coordinator: Fay Stephens  Regimen:  + 2Gy TBI  Donor: son (haplo)   Graft source: BM  - Admitted 9/13/24 for a  TBI PT Cy haplo (son) allogeneic BMT    Hypertension, essential  - Holding home Coreg for fluid responsive hypotension. Resume as indicated.    Coronary artery disease involving native coronary artery of native heart without angina pectoris  - Holding home Coreg for hypotension. Resume as indicated.        VTE Risk Mitigation (From admission, onward)           Ordered     heparin, porcine (PF) 100 unit/mL injection flush 300 Units  As needed (PRN)         09/13/24 2702                    Disposition: Will remain inpatient through count recovery.    Mayuri Michel MD  Bone Marrow Transplant  [facility] - Oncology ([facility])

## 2024-09-20 NOTE — SUBJECTIVE & OBJECTIVE
Subjective:     Interval History: Day +1 for a  TBI PT Cy haplo (son) allogeneic BMT for MDS.  Received 3.55 x 10^6 CD34/kg last night in a fresh transplant. Early morning temp spike to 102.9, infectious workup completed. Conversation today conducted entirely through ipad .  1 loose stool followed by a formed bowel movement.    Objective:     Vital Signs (Most Recent):  Temp: (!) 101.2 °F (38.4 °C) (09/20/24 1305)  Pulse: 103 (09/20/24 1305)  Resp: 18 (09/20/24 1132)  BP: (!) 102/51 (09/20/24 1145)  SpO2: 97 % (09/20/24 1305) Vital Signs (24h Range):  Temp:  [97.3 °F (36.3 °C)-102.9 °F (39.4 °C)] 101.2 °F (38.4 °C)  Pulse:  [] 103  Resp:  [12-26] 18  SpO2:  [90 %-99 %] 97 %  BP: ()/(51-84) 102/51     Weight: 77.3 kg (170 lb 6.7 oz) (pt also had additional blankets in bed with him; unstable gait so pt can not stand on scale)  Body mass index is 25.17 kg/m².  Body surface area is 1.94 meters squared.    ECOG SCORE           [unfilled]    Intake/Output - Last 3 Shifts         09/18 0700  09/19 0659 09/19 0700 09/20 0659 09/20 0700 09/21 0659    P.O. 450 960 300    I.V. (mL/kg) 2720.6 (35.9) 1331.1 (17.2)     IV Piggyback 99.4 99.6     Total Intake(mL/kg) 3270 (43.1) 2390.7 (30.9) 300 (3.9)    Urine (mL/kg/hr) 2050 (1.1) 2800 (1.5)     Stool 0 0     Total Output 2050 2800     Net +1220 -409.3 +300           Urine Occurrence  2 x     Stool Occurrence 1 x 0 x              Physical Exam  Constitutional:       Appearance: He is well-developed.   HENT:      Head: Normocephalic and atraumatic.      Mouth/Throat:      Pharynx: No oropharyngeal exudate.   Eyes:      General:         Right eye: No discharge.         Left eye: No discharge.      Conjunctiva/sclera: Conjunctivae normal.      Pupils: Pupils are equal, round, and reactive to light.   Cardiovascular:      Rate and Rhythm: Normal rate and regular rhythm.      Heart sounds: Normal heart sounds. No murmur heard.  Pulmonary:      Effort:  Pulmonary effort is normal. No respiratory distress.      Breath sounds: Normal breath sounds. No wheezing or rales.   Abdominal:      General: Bowel sounds are normal. There is no distension.      Palpations: Abdomen is soft.      Tenderness: There is no abdominal tenderness.   Musculoskeletal:         General: No deformity. Normal range of motion.      Cervical back: Normal range of motion and neck supple.   Skin:     General: Skin is warm and dry.      Findings: No erythema or rash.      Comments: Right chest wall vas cath. Dressing c/d/i. No sign of infection to site.   Neurological:      Mental Status: He is alert and oriented to person, place, and time.   Psychiatric:         Behavior: Behavior normal.         Thought Content: Thought content normal.         Judgment: Judgment normal.            Significant Labs:   CBC:   Recent Labs   Lab 09/19/24  0425 09/20/24  0452   WBC 0.18* 0.06*   HGB 7.0* 6.4*   HCT 20.9* 19.5*   PLT 29* 36*    and CMP:   Recent Labs   Lab 09/19/24  0425 09/20/24  0452    139   K 4.5 3.7    109   CO2 25 24   GLU 96 146*   BUN 12 14   CREATININE 0.8 0.8   CALCIUM 7.7* 8.3*   PROT 4.6* 4.7*   ALBUMIN 2.7* 2.7*   BILITOT 0.5 0.4   ALKPHOS 56 55   AST 19 26   ALT 17 18   ANIONGAP 6* 6*       Diagnostic Results:  I have reviewed all pertinent imaging results/findings within the past 24 hours.

## 2024-09-20 NOTE — PT/OT/SLP PROGRESS
Occupational Therapy   Treatment    Name: Guillaume Salinas  MRN: [id number]  Admitting Diagnosis:  Stem cell transplant candidate       Recommendations:     Discharge Recommendations: No Therapy Indicated  Discharge Equipment Recommendations:  none  Barriers to discharge:  None    Assessment:     Guillaume Salinas is a 69 y.o. male with a medical diagnosis of Stem cell transplant candidate.  He presents with the following performance deficits affecting function are impaired endurance, impaired self care skills. Pt limited to EOB activities due to low HGB(6.4). However, pt did well in today's session. Pt would benefit from continued skilled acute OT services in order to maximize (I) and with ADLs and functional mobility to ensure safe return to PLOF in the least restrictive environment. OT recommending no further OT once pt is medically appropriate for d/c.     Rehab Prognosis:  Good; patient would benefit from acute skilled OT services to address these deficits and reach maximum level of function.       Plan:     Patient to be seen 1 x/week to address the above listed problems via self-care/home management, therapeutic activities, therapeutic exercises, neuromuscular re-education  Plan of Care Expires: 10/15/24  Plan of Care Reviewed with: patient, family    Subjective     Chief Complaint: none stated   Patient/Family Comments/goals: PLOF  Pain/Comfort:  Pain Rating 1: 0/10    Objective:     Communicated with: nurse max prior to session.  Patient found HOB elevated with central line, PureWick upon OT entry to room.     Tammi #[id number] via virtual     General Precautions: Standard, fall    Orthopedic Precautions:N/A  Braces: N/A  Respiratory Status: Room air     Occupational Performance:     Bed Mobility:    Patient completed Scooting/Bridging with modified independence  Patient completed Supine to Sit with SBA  Patient completed Sit to Supine with SBA    Functional  Mobility/Transfers:  Patient completed Sit <> Stand Transfer with CGA with no assistive device   Functional Mobility: Pt took 4 lateral steps CGA with no AD towards HOB    Activities of Daily Living:  Grooming: set up for oral and facial hygiene seated EOB     Therapeutic Exercises:   Pt completed the following exercises seated EOB:  Leg extensions x 10 reps each leg  Marches x 10 reps each leg    B ankle pumps x 10 reps each leg  AMPAC 6 Click ADL: 22    Treatment & Education:  Pt edu on goals and progress.   Addressed all pt questions/concerns with in the [facility] scope of practice.      Patient left HOB elevated with all lines intact, call button in reach, and nurse and family present    GOALS:   Multidisciplinary Problems       Occupational Therapy Goals          Problem: Occupational Therapy    Goal Priority Disciplines Outcome Interventions   Occupational Therapy Goal     OT, PT/OT Progressing    Description: Goals to be met by: 9/29/2024     Patient will increase functional independence with ADLs by performing:    Pt will perform functional mobility for household and community distances with no AD and independence for 2 consecutive sessions.   Pt will perform grooming while standing at sink with independence for 2 consecutive sessions.    Pt will perform all dressing tasks with Mod I for 2 consecutive sessions.                          Time Tracking:     OT Date of Treatment: 09/20/24  OT Start Time: 0944  OT Stop Time: 1002  OT Total Time (min): 18 min    Billable Minutes:Self Care/Home Management 18    OT/DIMITRIS: DIMITRIS     Number of DIMITRIS visits since last OT visit: 1    9/20/2024

## 2024-09-20 NOTE — PLAN OF CARE
D+1 HaploSCT. AAOx4.  used throughout shift as needed. POC reviewed with patient and family; understanding verbalized. 1 unit RBCs transfused, benadryl given as pre meds. Tmax 101.2, MD notified and tylenol given. 2L nasal canula due to pt desating when ambulating. Telemetry ordered. Bedside commode provided,1 person assist. Bed alarm set. Fall risk educated to pt and family. Family to remain at bedside.Pt. instructed to call prior to getting OOB. Pt. with nonskid footwear on, bed in lowest position, and locked with bed rails up x2. Pt. has call light and personal items within reach.  VSS and afebrile this shift. Q1H rounding. All questions and concerns addressed at this time.

## 2024-09-20 NOTE — PT/OT/SLP PROGRESS
Occupational Therapy  OT/DIMITRIS Client Care Conference Note    Patient Name:  Guillaume Salinas   MRN:  [id number]    A client care conference was completed by the OTR and the KILPATRICK prior to treatment by the KILPATRICK to discuss the patient's POC and current status.      9/20/2024

## 2024-09-20 NOTE — PLAN OF CARE
Recommendation/Intervention:   Continue current regular diet as tolerated, encourage PO intake  Boost Plus BID to promote adequate kcal/protein consumption  RD to monitor and follow-up as needed     Goals: Meet % EEN, EPN by RD f/u date  Nutrition Goal Status: progressing towards goal  Communication of RD Recs: other (comment) (POC)

## 2024-09-20 NOTE — ASSESSMENT & PLAN NOTE
- Patient of Dr. 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 Today is Day +1.  - Patient is B+. Donor is A+.  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 Patient will start letermovir ppx on 9/24/24.  - Received fresh product last night [9/19/24] with a CD34 dose of 3.55 x10^6.  - Of note, cilostazol may interact with tacro. (Currently on hold for plts < 50K).  - See treatment plan below:    Planned conditioning regimen:  Fludarabine on Day -5, -4, -3, and -2  Melphalan on Day -2  TBI on Day -1     Planned  GVHD Prophylaxis:  Cyclophosphamide (with Mesna) on Days +3 and +4  Mycophenolate starting on Day +5  Tacrolimus starting on Day +5     Antimicrobial Prophylaxis:  Acyclovir starting on Day -5  Levofloxacin starting on Day -1  Micafungin on Day -1 through Day +4  Letermovir starting on Day +5 (if CMV PCR drawn on day 0 results negative)  Posaconazole starting on Day +5  Bactrim starting on Day +30     SOS/VOD Prophylaxis:  Ursodiol on Day -5 through Day +30      Growth Factor Support:  Neupogen starting on Day +5     Caregiver: wife   Post-transplant discharge plans: home

## 2024-09-20 NOTE — ASSESSMENT & PLAN NOTE
From Dr Hickey's clinic note 8/5:  Stem cell transplant candidate: We discussed the role for allogeneic stem cell transplantation in this disease process as a potentially curative option. We had an extensive discussion about the rationale, logistics, risks, and benefits. We reviewed the requirement to stay in the [place] area for 100 days with a caregiver at all times. We discussed the risks, including infection, graft failure, organ toxicity, graft versus host disease, relapse of disease, and secondary cancers. We reviewed the need for long-term immunosuppression and need for close monitoring. HCT-CI of 1 (intermediate risk). We had a prolonged discussion today regarding his recent deconditioning, Cdiff, and underlying disease. He is now much improved since last seen, and is improving his strength since starting to work with PT. Diarrhea now controlled. We discussed that our plan to move forward with the transplant process.   Coordinator: Fay Stephens  Regimen:  + 2Gy TBI  Donor: son (haplo)   Graft source: BM  - Admitted 9/13/24 for a  TBI PT Cy haplo (son) allogeneic BMT

## 2024-09-20 NOTE — CARE UPDATE
"RAPID RESPONSE NURSE CHART REVIEW        Chart Reviewed: 09/20/2024, 2:28 AM    MRN: [id number]  Bed: 844/844 A    Dx: Stem cell transplant candidate    Guillaume Salinas has a past medical history of Anticoagulant long-term use, Coronary artery disease, Hypertension, Myelodysplastic syndrome, and Peripheral vascular disease, unspecified.    Last VS: BP (!) 114/56 (BP Location: Right arm, Patient Position: Lying)   Pulse (!) 137   Temp (!) 102.9 °F (39.4 °C)   Resp (!) 21   Ht 5' 9" (1.753 m)   Wt 75.8 kg (167 lb 1.7 oz)   SpO2 96%   BMI 24.68 kg/m²     24H Vital Sign Range:  Temp:  [97.3 °F (36.3 °C)-102.9 °F (39.4 °C)]   Pulse:  []   Resp:  [17-26]   BP: ()/(50-84)   SpO2:  [90 %-99 %]     Level of Consciousness (AVPU): alert    Recent Labs     09/17/24  0404 09/18/24  0426 09/19/24  0425   WBC 0.48* 0.91* 0.18*   HGB 7.9* 8.1* 7.0*   HCT 22.4* 23.2* 20.9*   PLT 45* 44* 29*       Recent Labs     09/17/24  0404 09/18/24  0426 09/19/24  0425    138 141   K 4.3 4.6 4.5    108 110   CO2 27 26 25   BUN 7* 10 12   CREATININE 0.8 0.7 0.8   GLU 98 126* 96   PHOS 4.2 2.7 4.6*   MG 1.8 1.9 1.9        No results for input(s): "PH", "PCO2", "PO2", "HCO3", "POCSATURATED", "BE" in the last 72 hours.     OXYGEN:  Flow (L/min) (Oxygen Therapy): 2          MEWS score: 2    bedside RNSarahi notified in regards to temperature of 102.9 and -140's. Team already notifed and work up ordered.   No additional concerns verbalized at this time. Instructed to call [phone] for further concerns or assistance.    Sima Johnson RN       "

## 2024-09-20 NOTE — PLAN OF CARE
Day +1  Flu/Rosalee Haplo SCT. PRN  trazodone given. No complains of pain or nausea. NaCl w/ KCL 20 mEq @ 75 mL/hr.   After, transplant pt began to have tremors. Meperidine given to treat that and tremors went away. Pt also spiked a Tmax of 102.9.  Notified. Tylenol and cefepime given. Temperature taken again and it was 101.8. Will continue to monitor.  Pt ambulates in room and to bathroom independently. No difficulty voiding. Pt has had one soft, loose BM. Other VS have been stable throughout shift. Pt AOx4. POC reviewed with patient, wife, and daughter. All verbalized an understanding. Questions encouraged and answered. Bed is in the lowest position and locked. Non skid socks worn. Call light within reach. Pt encouraged to call for assistance when needed

## 2024-09-20 NOTE — PROCEDURES
I was present for infusion of hematopoietic stem cells. He received a total TNC dose of 2.58 x 10^8/kg and a CD34 dose of 3.55 x 10^6 CD34/kg. Total volume infused 194 mL. He tolerated the procedure well without complications.

## 2024-09-21 LAB
ALBUMIN SERPL BCP-MCNC: 2.6 G/DL (ref 3.5–5.2)
ALP SERPL-CCNC: 57 U/L (ref 55–135)
ALT SERPL W/O P-5'-P-CCNC: 20 U/L (ref 10–44)
ANION GAP SERPL CALC-SCNC: 5 MMOL/L (ref 8–16)
ANISOCYTOSIS BLD QL SMEAR: SLIGHT
AST SERPL-CCNC: 25 U/L (ref 10–40)
BASOPHILS # BLD AUTO: 0 K/UL (ref 0–0.2)
BASOPHILS NFR BLD: 0 % (ref 0–1.9)
BILIRUB SERPL-MCNC: 0.5 MG/DL (ref 0.1–1)
BUN SERPL-MCNC: 9 MG/DL (ref 8–23)
CALCIUM SERPL-MCNC: 8.3 MG/DL (ref 8.7–10.5)
CHLORIDE SERPL-SCNC: 109 MMOL/L (ref 95–110)
CO2 SERPL-SCNC: 26 MMOL/L (ref 23–29)
CREAT SERPL-MCNC: 0.7 MG/DL (ref 0.5–1.4)
DIFFERENTIAL METHOD BLD: ABNORMAL
EOSINOPHIL # BLD AUTO: 0 K/UL (ref 0–0.5)
EOSINOPHIL NFR BLD: 0 % (ref 0–8)
ERYTHROCYTE [DISTWIDTH] IN BLOOD BY AUTOMATED COUNT: 14.1 % (ref 11.5–14.5)
EST. GFR  (NO RACE VARIABLE): >60 ML/MIN/1.73 M^2
GLUCOSE SERPL-MCNC: 134 MG/DL (ref 70–110)
HCT VFR BLD AUTO: 24.9 % (ref 40–54)
HGB BLD-MCNC: 8.1 G/DL (ref 14–18)
IMM GRANULOCYTES # BLD AUTO: 0 K/UL (ref 0–0.04)
IMM GRANULOCYTES NFR BLD AUTO: 0 % (ref 0–0.5)
LYMPHOCYTES # BLD AUTO: 0 K/UL (ref 1–4.8)
LYMPHOCYTES NFR BLD: 0 % (ref 18–48)
MAGNESIUM SERPL-MCNC: 2.1 MG/DL (ref 1.6–2.6)
MCH RBC QN AUTO: 29.2 PG (ref 27–31)
MCHC RBC AUTO-ENTMCNC: 32.5 G/DL (ref 32–36)
MCV RBC AUTO: 90 FL (ref 82–98)
MONOCYTES # BLD AUTO: 0 K/UL (ref 0.3–1)
MONOCYTES NFR BLD: 0 % (ref 4–15)
NEUTROPHILS # BLD AUTO: 0.1 K/UL (ref 1.8–7.7)
NEUTROPHILS NFR BLD: 100 % (ref 38–73)
NRBC BLD-RTO: 0 /100 WBC
OVALOCYTES BLD QL SMEAR: ABNORMAL
PHOSPHATE SERPL-MCNC: 4.3 MG/DL (ref 2.7–4.5)
PLATELET # BLD AUTO: 26 K/UL (ref 150–450)
PLATELET BLD QL SMEAR: ABNORMAL
PMV BLD AUTO: 10.8 FL (ref 9.2–12.9)
POIKILOCYTOSIS BLD QL SMEAR: SLIGHT
POLYCHROMASIA BLD QL SMEAR: ABNORMAL
POTASSIUM SERPL-SCNC: 4 MMOL/L (ref 3.5–5.1)
PROT SERPL-MCNC: 5 G/DL (ref 6–8.4)
RBC # BLD AUTO: 2.77 M/UL (ref 4.6–6.2)
SODIUM SERPL-SCNC: 140 MMOL/L (ref 136–145)
SPHEROCYTES BLD QL SMEAR: ABNORMAL
WBC # BLD AUTO: 0.05 K/UL (ref 3.9–12.7)

## 2024-09-21 PROCEDURE — 25000003 PHARM REV CODE 250: Performed by: STUDENT IN AN ORGANIZED HEALTH CARE EDUCATION/TRAINING PROGRAM

## 2024-09-21 PROCEDURE — 25000003 PHARM REV CODE 250: Performed by: INTERNAL MEDICINE

## 2024-09-21 PROCEDURE — 84100 ASSAY OF PHOSPHORUS: CPT | Performed by: NURSE PRACTITIONER

## 2024-09-21 PROCEDURE — 80053 COMPREHEN METABOLIC PANEL: CPT | Performed by: NURSE PRACTITIONER

## 2024-09-21 PROCEDURE — 83735 ASSAY OF MAGNESIUM: CPT | Performed by: NURSE PRACTITIONER

## 2024-09-21 PROCEDURE — 63600175 PHARM REV CODE 636 W HCPCS: Performed by: INTERNAL MEDICINE

## 2024-09-21 PROCEDURE — 99233 SBSQ HOSP IP/OBS HIGH 50: CPT | Mod: GC,,, | Performed by: INTERNAL MEDICINE

## 2024-09-21 PROCEDURE — 63600175 PHARM REV CODE 636 W HCPCS: Performed by: STUDENT IN AN ORGANIZED HEALTH CARE EDUCATION/TRAINING PROGRAM

## 2024-09-21 PROCEDURE — 25000003 PHARM REV CODE 250: Performed by: NURSE PRACTITIONER

## 2024-09-21 PROCEDURE — 36415 COLL VENOUS BLD VENIPUNCTURE: CPT | Performed by: NURSE PRACTITIONER

## 2024-09-21 PROCEDURE — 20600001 HC STEP DOWN PRIVATE ROOM

## 2024-09-21 PROCEDURE — 85025 COMPLETE CBC W/AUTO DIFF WBC: CPT | Performed by: NURSE PRACTITIONER

## 2024-09-21 RX ORDER — TRAMADOL HYDROCHLORIDE 50 MG/1
50 TABLET ORAL EVERY 6 HOURS PRN
Status: DISCONTINUED | OUTPATIENT
Start: 2024-09-21 | End: 2024-09-27

## 2024-09-21 RX ADMIN — Medication 1 DOSE: at 05:09

## 2024-09-21 RX ADMIN — Medication 1 DOSE: at 12:09

## 2024-09-21 RX ADMIN — Medication 1 DOSE: at 08:09

## 2024-09-21 RX ADMIN — SODIUM CHLORIDE AND POTASSIUM CHLORIDE 75 ML/HR: .9; .15 SOLUTION INTRAVENOUS at 12:09

## 2024-09-21 RX ADMIN — VANCOMYCIN HYDROCHLORIDE 125 MG: KIT at 09:09

## 2024-09-21 RX ADMIN — PANTOPRAZOLE SODIUM 40 MG: 40 TABLET, DELAYED RELEASE ORAL at 08:09

## 2024-09-21 RX ADMIN — ACYCLOVIR 800 MG: 200 CAPSULE ORAL at 08:09

## 2024-09-21 RX ADMIN — GABAPENTIN 600 MG: 300 CAPSULE ORAL at 08:09

## 2024-09-21 RX ADMIN — VANCOMYCIN HYDROCHLORIDE 125 MG: KIT at 08:09

## 2024-09-21 RX ADMIN — URSODIOL 300 MG: 300 CAPSULE ORAL at 08:09

## 2024-09-21 RX ADMIN — CEFEPIME 2 G: 2 INJECTION, POWDER, FOR SOLUTION INTRAVENOUS at 03:09

## 2024-09-21 RX ADMIN — TRAZODONE HYDROCHLORIDE 50 MG: 50 TABLET ORAL at 08:09

## 2024-09-21 RX ADMIN — CEFEPIME 2 G: 2 INJECTION, POWDER, FOR SOLUTION INTRAVENOUS at 12:09

## 2024-09-21 RX ADMIN — MICAFUNGIN SODIUM 100 MG: 100 INJECTION, POWDER, LYOPHILIZED, FOR SOLUTION INTRAVENOUS at 09:09

## 2024-09-21 RX ADMIN — CEFEPIME 2 G: 2 INJECTION, POWDER, FOR SOLUTION INTRAVENOUS at 08:09

## 2024-09-21 NOTE — ASSESSMENT & PLAN NOTE
--T max of 101.5 overnight   --Infectious workup less thank 24 hours prior  --Continue Cefepime. Will add Vanc with an additional fever   No

## 2024-09-21 NOTE — ASSESSMENT & PLAN NOTE
- Patient of Dr. 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 Today is Day +2.  - Patient is B+. Donor is A+.  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 Patient will start letermovir ppx on 9/24/24.  - Received fresh product last night [9/19/24] with a CD34 dose of 3.55 x10^6.  - Of note, cilostazol may interact with tacro. (Currently on hold for plts < 50K).  - See treatment plan below:    Planned conditioning regimen:  Fludarabine on Day -5, -4, -3, and -2  Melphalan on Day -2  TBI on Day -1     Planned  GVHD Prophylaxis:  Cyclophosphamide (with Mesna) on Days +3 and +4  Mycophenolate starting on Day +5  Tacrolimus starting on Day +5     Antimicrobial Prophylaxis:  Acyclovir starting on Day -5  Levofloxacin starting on Day -1  Micafungin on Day -1 through Day +4  Letermovir starting on Day +5 (if CMV PCR drawn on day 0 results negative)  Posaconazole starting on Day +5  Bactrim starting on Day +30     SOS/VOD Prophylaxis:  Ursodiol on Day -5 through Day +30      Growth Factor Support:  Neupogen starting on Day +5     Caregiver: wife   Post-transplant discharge plans: home

## 2024-09-21 NOTE — ASSESSMENT & PLAN NOTE
From Dr Hickey's clinic note 8/5:  Stem cell transplant candidate: We discussed the role for allogeneic stem cell transplantation in this disease process as a potentially curative option. We had an extensive discussion about the rationale, logistics, risks, and benefits. We reviewed the requirement to stay in the [place] area for 100 days with a caregiver at all times. We discussed the risks, including infection, graft failure, organ toxicity, graft versus host disease, relapse of disease, and secondary cancers. We reviewed the need for long-term immunosuppression and need for close monitoring. HCT-CI of 1 (intermediate risk). We had a prolonged discussion today regarding his recent deconditioning, Cdiff, and underlying disease. He is now much improved since last seen, and is improving his strength since starting to work with PT. Diarrhea now controlled. We discussed that our plan to move forward with the transplant process.   Coordinator: Fay Stephens  Regimen:  + 2Gy TBI  Donor: son (haplo)   Graft source: BM  - Admitted 9/13/24 for a  TBI PT Cy haplo (son) allogeneic BMT

## 2024-09-21 NOTE — PLAN OF CARE
Day +2 of Allo SCT for MDS. Plan of care reviewed. Patient is oriented X4. Assistance x1. Bedside commode in use. Right chest vaughn infusing maintenance fluids @ 75 ml/hr. Cardiac monitoring continued. Remains on 2 liters of oxygen. No complaints of pain or discomfort at this time. No N/V. T-max 101.5. All questions and concerns addressed. Daughter at bedside. All safety precautions in place. Remains free of injury. All needs met at this time.

## 2024-09-21 NOTE — PROGRESS NOTES
[facility] - Oncology ([facility])  Hematology  Bone Marrow Transplant  Progress Note    Patient Name: Guillaume Salinas  Admission Date: 9/13/2024  Hospital Length of Stay: 8 days  Code Status: Full Code    Subjective:     Interval History: Day +2 for a  TBI PT Cy haplo (son) allogeneic BMT for MDS.  Tmax of 101.5 overnight. Feels like he rested better last night. Complains of thigh cramps,that are subsiding without intervention. 1 soft stool today, no liquid stools.     Objective:     Vital Signs (Most Recent):  Temp: 99.1 °F (37.3 °C) (09/21/24 1552)  Pulse: 88 (09/21/24 1552)  Resp: 18 (09/21/24 1552)  BP: 111/65 (09/21/24 1552)  SpO2: 97 % (09/21/24 1552) Vital Signs (24h Range):  Temp:  [98.1 °F (36.7 °C)-101.5 °F (38.6 °C)] 99.1 °F (37.3 °C)  Pulse:  [] 88  Resp:  [18-20] 18  SpO2:  [95 %-98 %] 97 %  BP: (102-143)/(62-78) 111/65     Weight: 77.3 kg (170 lb 6.7 oz) (pt also had additional blankets in bed with him; unstable gait so pt can not stand on scale)  Body mass index is 25.17 kg/m².  Body surface area is 1.94 meters squared.    ECOG SCORE           [unfilled]    Intake/Output - Last 3 Shifts         09/19 0700 09/20 0659 09/20 0700 09/21 0659 09/21 0700 09/22 0659    P.O. 960 1040 400    I.V. (mL/kg) 1331.1 (17.2) 2804.6 (36.3)     Blood  653.8     IV Piggyback 99.6 393.6     Total Intake(mL/kg) 2390.7 (30.9) 4891.9 (63.3) 400 (5.2)    Urine (mL/kg/hr) 2800 (1.5) 2570 (1.4) 1110 (1.6)    Stool 0 0 0    Total Output 2800 2570 1110    Net -409.3 +2321.9 -710           Urine Occurrence 2 x      Stool Occurrence 0 x 0 x 1 x             Physical Exam  Constitutional:       Appearance: He is well-developed.   HENT:      Head: Normocephalic and atraumatic.      Mouth/Throat:      Pharynx: No oropharyngeal exudate.   Eyes:      General:         Right eye: No discharge.         Left eye: No discharge.      Conjunctiva/sclera: Conjunctivae normal.      Pupils: Pupils are equal, round, and  reactive to light.   Cardiovascular:      Rate and Rhythm: Normal rate and regular rhythm.      Heart sounds: Normal heart sounds. No murmur heard.  Pulmonary:      Effort: Pulmonary effort is normal. No respiratory distress.      Breath sounds: Normal breath sounds. No wheezing or rales.   Abdominal:      General: Bowel sounds are normal. There is no distension.      Palpations: Abdomen is soft.      Tenderness: There is no abdominal tenderness.   Musculoskeletal:         General: No deformity. Normal range of motion.      Cervical back: Normal range of motion and neck supple.   Skin:     General: Skin is warm and dry.      Findings: No erythema or rash.      Comments: Right chest wall vas cath. Dressing c/d/i. No sign of infection to site.   Neurological:      Mental Status: He is alert and oriented to person, place, and time.   Psychiatric:         Behavior: Behavior normal.         Thought Content: Thought content normal.         Judgment: Judgment normal.            Significant Labs:   CBC:   Recent Labs   Lab 09/20/24  0452 09/21/24  0304   WBC 0.06* 0.05*   HGB 6.4* 8.1*   HCT 19.5* 24.9*   PLT 36* 26*    and CMP:   Recent Labs   Lab 09/20/24  0452 09/21/24  0304    140   K 3.7 4.0    109   CO2 24 26   * 134*   BUN 14 9   CREATININE 0.8 0.7   CALCIUM 8.3* 8.3*   PROT 4.7* 5.0*   ALBUMIN 2.7* 2.6*   BILITOT 0.4 0.5   ALKPHOS 55 57   AST 26 25   ALT 18 20   ANIONGAP 6* 5*       Diagnostic Results:  I have reviewed all pertinent imaging results/findings within the past 24 hours.  Assessment/Plan:     * Stem cell transplant candidate  - Patient of Dr. 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 Today is Day +2.  - Patient is B+. Donor is A+.  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 Patient will start letermovir ppx on 9/24/24.  - Received fresh product last night [9/19/24] with a CD34 dose of 3.55 x10^6.  - Of note, cilostazol may  interact with tacro. (Currently on hold for plts < 50K).  - See treatment plan below:    Planned conditioning regimen:  Fludarabine on Day -5, -4, -3, and -2  Melphalan on Day -2  TBI on Day -1     Planned  GVHD Prophylaxis:  Cyclophosphamide (with Mesna) on Days +3 and +4  Mycophenolate starting on Day +5  Tacrolimus starting on Day +5     Antimicrobial Prophylaxis:  Acyclovir starting on Day -5  Levofloxacin starting on Day -1  Micafungin on Day -1 through Day +4  Letermovir starting on Day +5 (if CMV PCR drawn on day 0 results negative)  Posaconazole starting on Day +5  Bactrim starting on Day +30     SOS/VOD Prophylaxis:  Ursodiol on Day -5 through Day +30      Growth Factor Support:  Neupogen starting on Day +5     Caregiver: wife   Post-transplant discharge plans: home        Neutropenic fever  --T max of 101.5 overnight   --Infectious workup less thank 24 hours prior  --Continue Cefepime. Will add Vanc with an additional fever    Insomnia  - Continue home PRN Trazodone    History of Clostridioides difficile infection  - Continue oral vanc ppx per transplant protocol    Neuropathy  - Continue home gabapentin    Pancytopenia  - Expected to worsen following chemotherapy  - Daily CBC while inpatient  - Transfuse for hgb <7 Plt  or <10K  - Continue antimicrobial ppx  - Holding cilostazol for plts < 50K    Myelodysplastic syndrome  From Dr Hickey's clinic note 8/5:  Stem cell transplant candidate: We discussed the role for allogeneic stem cell transplantation in this disease process as a potentially curative option. We had an extensive discussion about the rationale, logistics, risks, and benefits. We reviewed the requirement to stay in the [place] area for 100 days with a caregiver at all times. We discussed the risks, including infection, graft failure, organ toxicity, graft versus host disease, relapse of disease, and secondary cancers. We reviewed the need for long-term immunosuppression and need for close  monitoring. HCT-CI of 1 (intermediate risk). We had a prolonged discussion today regarding his recent deconditioning, Cdiff, and underlying disease. He is now much improved since last seen, and is improving his strength since starting to work with PT. Diarrhea now controlled. We discussed that our plan to move forward with the transplant process.   Coordinator: Fay Stephens  Regimen:  + 2Gy TBI  Donor: son (haplo)   Graft source: BM  - Admitted 9/13/24 for a  TBI PT Cy haplo (son) allogeneic BMT    Hypertension, essential  - Holding home Coreg for fluid responsive hypotension. Resume as indicated.    Coronary artery disease involving native coronary artery of native heart without angina pectoris  - Holding home Coreg for hypotension. Resume as indicated.        VTE Risk Mitigation (From admission, onward)           Ordered     heparin, porcine (PF) 100 unit/mL injection flush 300 Units  As needed (PRN)         09/13/24 8718                    Disposition: Will remain inpatient through engraftment    Mayuri Michel MD  Bone Marrow Transplant  [facility] - Oncology ([facility])

## 2024-09-21 NOTE — PLAN OF CARE
D+2 Haplo SCT. Patient AAOx4. POC reviewed with patient; understanding verbalized. NS w/20 K infusing @ 75ml/hr. Pt using urinal at bedside. Ambulates 1 person assist to bathroom. 1 BM this shift. Tolerating diet. Telemetry continuous. 1L nasal canula used when patient sleeping. Bed alarm set. Pt and family educated on fall risk. Family to remain at bedside. Pt. with nonskid footwear on, bed in lowest position, and locked with bed rails up x2.  Pt. instructed to call prior to getting OOB.  Pt. has call light and personal items within reach. VSS and afebrile this shift. Q1H rounding. All questions and concerns addressed at this time.

## 2024-09-21 NOTE — SUBJECTIVE & OBJECTIVE
Subjective:     Interval History: Day +2 for a  TBI PT Cy haplo (son) allogeneic BMT for MDS.  Tmax of 101.5 overnight. Feels like he rested better last night. Complains of thigh cramps,that are subsiding without intervention. 1 soft stool today, no liquid stools.     Objective:     Vital Signs (Most Recent):  Temp: 99.1 °F (37.3 °C) (09/21/24 1552)  Pulse: 88 (09/21/24 1552)  Resp: 18 (09/21/24 1552)  BP: 111/65 (09/21/24 1552)  SpO2: 97 % (09/21/24 1552) Vital Signs (24h Range):  Temp:  [98.1 °F (36.7 °C)-101.5 °F (38.6 °C)] 99.1 °F (37.3 °C)  Pulse:  [] 88  Resp:  [18-20] 18  SpO2:  [95 %-98 %] 97 %  BP: (102-143)/(62-78) 111/65     Weight: 77.3 kg (170 lb 6.7 oz) (pt also had additional blankets in bed with him; unstable gait so pt can not stand on scale)  Body mass index is 25.17 kg/m².  Body surface area is 1.94 meters squared.    ECOG SCORE           [unfilled]    Intake/Output - Last 3 Shifts         09/19 0700  09/20 0659 09/20 0700 09/21 0659 09/21 0700  09/22 0659    P.O. 960 1040 400    I.V. (mL/kg) 1331.1 (17.2) 2804.6 (36.3)     Blood  653.8     IV Piggyback 99.6 393.6     Total Intake(mL/kg) 2390.7 (30.9) 4891.9 (63.3) 400 (5.2)    Urine (mL/kg/hr) 2800 (1.5) 2570 (1.4) 1110 (1.6)    Stool 0 0 0    Total Output 2800 2570 1110    Net -409.3 +2321.9 -710           Urine Occurrence 2 x      Stool Occurrence 0 x 0 x 1 x             Physical Exam  Constitutional:       Appearance: He is well-developed.   HENT:      Head: Normocephalic and atraumatic.      Mouth/Throat:      Pharynx: No oropharyngeal exudate.   Eyes:      General:         Right eye: No discharge.         Left eye: No discharge.      Conjunctiva/sclera: Conjunctivae normal.      Pupils: Pupils are equal, round, and reactive to light.   Cardiovascular:      Rate and Rhythm: Normal rate and regular rhythm.      Heart sounds: Normal heart sounds. No murmur heard.  Pulmonary:      Effort: Pulmonary effort is normal. No respiratory  distress.      Breath sounds: Normal breath sounds. No wheezing or rales.   Abdominal:      General: Bowel sounds are normal. There is no distension.      Palpations: Abdomen is soft.      Tenderness: There is no abdominal tenderness.   Musculoskeletal:         General: No deformity. Normal range of motion.      Cervical back: Normal range of motion and neck supple.   Skin:     General: Skin is warm and dry.      Findings: No erythema or rash.      Comments: Right chest wall vas cath. Dressing c/d/i. No sign of infection to site.   Neurological:      Mental Status: He is alert and oriented to person, place, and time.   Psychiatric:         Behavior: Behavior normal.         Thought Content: Thought content normal.         Judgment: Judgment normal.            Significant Labs:   CBC:   Recent Labs   Lab 09/20/24  0452 09/21/24  0304   WBC 0.06* 0.05*   HGB 6.4* 8.1*   HCT 19.5* 24.9*   PLT 36* 26*    and CMP:   Recent Labs   Lab 09/20/24  0452 09/21/24  0304    140   K 3.7 4.0    109   CO2 24 26   * 134*   BUN 14 9   CREATININE 0.8 0.7   CALCIUM 8.3* 8.3*   PROT 4.7* 5.0*   ALBUMIN 2.7* 2.6*   BILITOT 0.4 0.5   ALKPHOS 55 57   AST 26 25   ALT 18 20   ANIONGAP 6* 5*       Diagnostic Results:  I have reviewed all pertinent imaging results/findings within the past 24 hours.

## 2024-09-21 NOTE — PROGRESS NOTES
09/20/24 2346   Vital Signs   Temp (!) 101.5 °F (38.6 °C)   Temp Source Oral   Pulse 101   Resp 18   SpO2 95 %   BP (!) 143/69   MAP (mmHg) 94   Biobeat   Systolic Average 129.33   Diastolic Average 69   Heart Rate/Pulse Average 98   Assessments (Pre/Post)   Level of Consciousness (AVPU) alert     Notified Dr. Philippe of elevated temp. Cefepime and vanc already included in scheduled medication. Septic work up completed on 9/20. No new orders at this time.

## 2024-09-22 LAB
ALBUMIN SERPL BCP-MCNC: 2.7 G/DL (ref 3.5–5.2)
ALP SERPL-CCNC: 54 U/L (ref 55–135)
ALT SERPL W/O P-5'-P-CCNC: 18 U/L (ref 10–44)
ANION GAP SERPL CALC-SCNC: 8 MMOL/L (ref 8–16)
ANISOCYTOSIS BLD QL SMEAR: SLIGHT
AST SERPL-CCNC: 19 U/L (ref 10–40)
BASOPHILS # BLD AUTO: 0 K/UL (ref 0–0.2)
BASOPHILS NFR BLD: 0 % (ref 0–1.9)
BILIRUB SERPL-MCNC: 0.4 MG/DL (ref 0.1–1)
BUN SERPL-MCNC: 7 MG/DL (ref 8–23)
CALCIUM SERPL-MCNC: 8.6 MG/DL (ref 8.7–10.5)
CHLORIDE SERPL-SCNC: 106 MMOL/L (ref 95–110)
CO2 SERPL-SCNC: 24 MMOL/L (ref 23–29)
CREAT SERPL-MCNC: 0.7 MG/DL (ref 0.5–1.4)
DIFFERENTIAL METHOD BLD: ABNORMAL
EOSINOPHIL # BLD AUTO: 0 K/UL (ref 0–0.5)
EOSINOPHIL NFR BLD: 0 % (ref 0–8)
ERYTHROCYTE [DISTWIDTH] IN BLOOD BY AUTOMATED COUNT: 14 % (ref 11.5–14.5)
EST. GFR  (NO RACE VARIABLE): >60 ML/MIN/1.73 M^2
GLUCOSE SERPL-MCNC: 136 MG/DL (ref 70–110)
HCT VFR BLD AUTO: 25.7 % (ref 40–54)
HGB BLD-MCNC: 8.3 G/DL (ref 14–18)
HYPOCHROMIA BLD QL SMEAR: ABNORMAL
IMM GRANULOCYTES # BLD AUTO: 0 K/UL (ref 0–0.04)
IMM GRANULOCYTES NFR BLD AUTO: 0 % (ref 0–0.5)
LYMPHOCYTES # BLD AUTO: 0 K/UL (ref 1–4.8)
LYMPHOCYTES NFR BLD: 0 % (ref 18–48)
MAGNESIUM SERPL-MCNC: 1.9 MG/DL (ref 1.6–2.6)
MCH RBC QN AUTO: 29.7 PG (ref 27–31)
MCHC RBC AUTO-ENTMCNC: 32.3 G/DL (ref 32–36)
MCV RBC AUTO: 92 FL (ref 82–98)
MONOCYTES # BLD AUTO: 0 K/UL (ref 0.3–1)
MONOCYTES NFR BLD: 0 % (ref 4–15)
NEUTROPHILS # BLD AUTO: 0 K/UL (ref 1.8–7.7)
NEUTROPHILS NFR BLD: 100 % (ref 38–73)
NRBC BLD-RTO: 0 /100 WBC
OVALOCYTES BLD QL SMEAR: ABNORMAL
PHOSPHATE SERPL-MCNC: 3.1 MG/DL (ref 2.7–4.5)
PLATELET # BLD AUTO: 18 K/UL (ref 150–450)
PMV BLD AUTO: 10.5 FL (ref 9.2–12.9)
POIKILOCYTOSIS BLD QL SMEAR: SLIGHT
POLYCHROMASIA BLD QL SMEAR: ABNORMAL
POTASSIUM SERPL-SCNC: 4.2 MMOL/L (ref 3.5–5.1)
PROT SERPL-MCNC: 5.2 G/DL (ref 6–8.4)
RBC # BLD AUTO: 2.79 M/UL (ref 4.6–6.2)
SODIUM SERPL-SCNC: 138 MMOL/L (ref 136–145)
WBC # BLD AUTO: 0.03 K/UL (ref 3.9–12.7)

## 2024-09-22 PROCEDURE — 25000003 PHARM REV CODE 250: Performed by: STUDENT IN AN ORGANIZED HEALTH CARE EDUCATION/TRAINING PROGRAM

## 2024-09-22 PROCEDURE — 83735 ASSAY OF MAGNESIUM: CPT | Performed by: NURSE PRACTITIONER

## 2024-09-22 PROCEDURE — 99233 SBSQ HOSP IP/OBS HIGH 50: CPT | Mod: GC,,, | Performed by: INTERNAL MEDICINE

## 2024-09-22 PROCEDURE — 63600175 PHARM REV CODE 636 W HCPCS: Performed by: INTERNAL MEDICINE

## 2024-09-22 PROCEDURE — 25000003 PHARM REV CODE 250

## 2024-09-22 PROCEDURE — 84100 ASSAY OF PHOSPHORUS: CPT | Performed by: NURSE PRACTITIONER

## 2024-09-22 PROCEDURE — 25000003 PHARM REV CODE 250: Performed by: INTERNAL MEDICINE

## 2024-09-22 PROCEDURE — 20600001 HC STEP DOWN PRIVATE ROOM

## 2024-09-22 PROCEDURE — 25000003 PHARM REV CODE 250: Performed by: NURSE PRACTITIONER

## 2024-09-22 PROCEDURE — 80053 COMPREHEN METABOLIC PANEL: CPT | Performed by: NURSE PRACTITIONER

## 2024-09-22 PROCEDURE — 85025 COMPLETE CBC W/AUTO DIFF WBC: CPT | Performed by: NURSE PRACTITIONER

## 2024-09-22 PROCEDURE — 63600175 PHARM REV CODE 636 W HCPCS: Performed by: STUDENT IN AN ORGANIZED HEALTH CARE EDUCATION/TRAINING PROGRAM

## 2024-09-22 RX ADMIN — TRAMADOL HYDROCHLORIDE 50 MG: 50 TABLET, COATED ORAL at 03:09

## 2024-09-22 RX ADMIN — CEFEPIME 2 G: 2 INJECTION, POWDER, FOR SOLUTION INTRAVENOUS at 01:09

## 2024-09-22 RX ADMIN — CEFEPIME 2 G: 2 INJECTION, POWDER, FOR SOLUTION INTRAVENOUS at 08:09

## 2024-09-22 RX ADMIN — GABAPENTIN 600 MG: 300 CAPSULE ORAL at 10:09

## 2024-09-22 RX ADMIN — SODIUM CHLORIDE AND POTASSIUM CHLORIDE 75 ML/HR: .9; .15 SOLUTION INTRAVENOUS at 11:09

## 2024-09-22 RX ADMIN — ACYCLOVIR 800 MG: 200 CAPSULE ORAL at 10:09

## 2024-09-22 RX ADMIN — VANCOMYCIN HYDROCHLORIDE 125 MG: KIT at 08:09

## 2024-09-22 RX ADMIN — MESNA 707 MG: 100 INJECTION, SOLUTION INTRAVENOUS at 11:09

## 2024-09-22 RX ADMIN — SODIUM CHLORIDE 500 ML: 9 INJECTION, SOLUTION INTRAVENOUS at 08:09

## 2024-09-22 RX ADMIN — CYCLOPHOSPHAMIDE 3500 MG: 200 INJECTION, SOLUTION INTRAVENOUS at 11:09

## 2024-09-22 RX ADMIN — ONDANSETRON 8 MG: 2 INJECTION INTRAMUSCULAR; INTRAVENOUS at 04:09

## 2024-09-22 RX ADMIN — Medication 1 DOSE: at 05:09

## 2024-09-22 RX ADMIN — Medication 400 MG: at 10:09

## 2024-09-22 RX ADMIN — LORAZEPAM 1 MG: 1 TABLET ORAL at 10:09

## 2024-09-22 RX ADMIN — APREPITANT 130 MG: 130 INJECTION, EMULSION INTRAVENOUS at 10:09

## 2024-09-22 RX ADMIN — ONDANSETRON 8 MG: 2 INJECTION INTRAMUSCULAR; INTRAVENOUS at 02:09

## 2024-09-22 RX ADMIN — Medication 1 DOSE: at 08:09

## 2024-09-22 RX ADMIN — URSODIOL 300 MG: 300 CAPSULE ORAL at 08:09

## 2024-09-22 RX ADMIN — SODIUM CHLORIDE 500 ML: 9 INJECTION, SOLUTION INTRAVENOUS at 02:09

## 2024-09-22 RX ADMIN — Medication 1 DOSE: at 10:09

## 2024-09-22 RX ADMIN — MICAFUNGIN SODIUM 100 MG: 100 INJECTION, POWDER, LYOPHILIZED, FOR SOLUTION INTRAVENOUS at 08:09

## 2024-09-22 RX ADMIN — PANTOPRAZOLE SODIUM 40 MG: 40 TABLET, DELAYED RELEASE ORAL at 10:09

## 2024-09-22 RX ADMIN — GABAPENTIN 600 MG: 300 CAPSULE ORAL at 08:09

## 2024-09-22 RX ADMIN — CEFEPIME 2 G: 2 INJECTION, POWDER, FOR SOLUTION INTRAVENOUS at 04:09

## 2024-09-22 RX ADMIN — MESNA 707 MG: 100 INJECTION, SOLUTION INTRAVENOUS at 08:09

## 2024-09-22 RX ADMIN — ACYCLOVIR 800 MG: 200 CAPSULE ORAL at 08:09

## 2024-09-22 RX ADMIN — Medication 400 MG: at 06:09

## 2024-09-22 RX ADMIN — TRAZODONE HYDROCHLORIDE 50 MG: 50 TABLET ORAL at 08:09

## 2024-09-22 RX ADMIN — MESNA 707 MG: 100 INJECTION, SOLUTION INTRAVENOUS at 02:09

## 2024-09-22 RX ADMIN — URSODIOL 300 MG: 300 CAPSULE ORAL at 10:09

## 2024-09-22 RX ADMIN — ONDANSETRON 8 MG: 2 INJECTION INTRAMUSCULAR; INTRAVENOUS at 08:09

## 2024-09-22 RX ADMIN — VANCOMYCIN HYDROCHLORIDE 125 MG: KIT at 11:09

## 2024-09-22 RX ADMIN — MESNA 707 MG: 100 INJECTION, SOLUTION INTRAVENOUS at 05:09

## 2024-09-22 NOTE — ASSESSMENT & PLAN NOTE
--T max of 99.5 overnight   --Infectious workup less thank 24 hours prior  --Continue Cefepime. Will add Vanc with an additional fever

## 2024-09-22 NOTE — PHYSICIAN QUERY
Please specify diagnosis or diagnoses associated with the clinical findings:  Pancytopenia due to chemotherapy  Pancytopenia due to myelodysplastic syndrome

## 2024-09-22 NOTE — ASSESSMENT & PLAN NOTE
From Dr Hickey's clinic note 8/5:  Stem cell transplant candidate: We discussed the role for allogeneic stem cell transplantation in this disease process as a potentially curative option. We had an extensive discussion about the rationale, logistics, risks, and benefits. We reviewed the requirement to stay in the [place] area for 100 days with a caregiver at all times. We discussed the risks, including infection, graft failure, organ toxicity, graft versus host disease, relapse of disease, and secondary cancers. We reviewed the need for long-term immunosuppression and need for close monitoring. HCT-CI of 1 (intermediate risk). We had a prolonged discussion today regarding his recent deconditioning, Cdiff, and underlying disease. He is now much improved since last seen, and is improving his strength since starting to work with PT. Diarrhea now controlled. We discussed that our plan to move forward with the transplant process.   Coordinator: Fay Stephens  Regimen:  + 2Gy TBI  Donor: son (haplo)   Graft source: BM  - Admitted 9/13/24 for a  TBI PT Cy haplo (son) allogeneic BMT

## 2024-09-22 NOTE — SUBJECTIVE & OBJECTIVE
Subjective:     Interval History:  Day +3 for a  TBI PT Cy haplo (son) allogeneic BMT for MDS.  Tmax of 99.5 overnight. Continues to rest well overnight. 1 headache relieved with pain meds. No further loose stools. Has hesitancy while urinating, feels this is due to trying to pee while laying down, but he does not want to try flomax.    Objective:     Vital Signs (Most Recent):  Temp: 98.3 °F (36.8 °C) (09/22/24 1209)  Pulse: 86 (09/22/24 1209)  Resp: 20 (09/22/24 1209)  BP: 120/75 (09/22/24 1209)  SpO2: 95 % (09/22/24 1209) Vital Signs (24h Range):  Temp:  [98.2 °F (36.8 °C)-99.5 °F (37.5 °C)] 98.3 °F (36.8 °C)  Pulse:  [81-97] 86  Resp:  [15-20] 20  SpO2:  [93 %-98 %] 95 %  BP: (109-138)/(65-79) 120/75     Weight: 77.3 kg (170 lb 6.7 oz) (pt also had additional blankets in bed with him; unstable gait so pt can not stand on scale)  Body mass index is 25.17 kg/m².  Body surface area is 1.94 meters squared.    ECOG SCORE           [unfilled]    Intake/Output - Last 3 Shifts         09/20 0700  09/21 0659 09/21 0700 09/22 0659 09/22 0700 09/23 0659    P.O. 1040 1100 300    I.V. (mL/kg) 2804.6 (36.3) 1611.5 (20.8)     Blood 653.8      IV Piggyback 393.6 156.9     Total Intake(mL/kg) 4891.9 (63.3) 2868.4 (37.1) 300 (3.9)    Urine (mL/kg/hr) 2570 (1.4) 3880 (2.1) 1350 (2.6)    Stool 0 0     Total Output 2570 3880 1350    Net +2321.9 -1011.6 -1050           Stool Occurrence 0 x 2 x              Physical Exam  Constitutional:       Appearance: He is well-developed.   HENT:      Head: Normocephalic and atraumatic.      Mouth/Throat:      Pharynx: No oropharyngeal exudate.   Eyes:      General:         Right eye: No discharge.         Left eye: No discharge.      Conjunctiva/sclera: Conjunctivae normal.      Pupils: Pupils are equal, round, and reactive to light.   Cardiovascular:      Rate and Rhythm: Normal rate and regular rhythm.      Heart sounds: Normal heart sounds. No murmur heard.  Pulmonary:      Effort:  Pulmonary effort is normal. No respiratory distress.      Breath sounds: Normal breath sounds. No wheezing or rales.   Abdominal:      General: Bowel sounds are normal. There is no distension.      Palpations: Abdomen is soft.      Tenderness: There is no abdominal tenderness.   Musculoskeletal:         General: No deformity. Normal range of motion.      Cervical back: Normal range of motion and neck supple.   Skin:     General: Skin is warm and dry.      Findings: No erythema or rash.      Comments: Right chest wall vas cath. Dressing c/d/i. No sign of infection to site.   Neurological:      Mental Status: He is alert and oriented to person, place, and time.   Psychiatric:         Behavior: Behavior normal.         Thought Content: Thought content normal.         Judgment: Judgment normal.            Significant Labs:   CBC:   Recent Labs   Lab 09/21/24  0304 09/22/24  0353   WBC 0.05* 0.03*   HGB 8.1* 8.3*   HCT 24.9* 25.7*   PLT 26* 18*    and CMP:   Recent Labs   Lab 09/21/24  0304 09/22/24  0353    138   K 4.0 4.2    106   CO2 26 24   * 136*   BUN 9 7*   CREATININE 0.7 0.7   CALCIUM 8.3* 8.6*   PROT 5.0* 5.2*   ALBUMIN 2.6* 2.7*   BILITOT 0.5 0.4   ALKPHOS 57 54*   AST 25 19   ALT 20 18   ANIONGAP 5* 8       Diagnostic Results:  I have reviewed all pertinent imaging results/findings within the past 24 hours.

## 2024-09-22 NOTE — PLAN OF CARE
D+3 Haplo SCT. Patient AAOx4. POC reviewed with patient; understanding verbalized. Slovenian interpretor used as needed throughout shift. Chemo infused. NS w/20 K infusing @ 75mL/hr. Telemetry continuous. Fair oral intake. Good urine output. Bed alarm set and family assist patient to restroom. Pt and family educated on fall risk. Pt. with nonskid footwear on, bed in lowest position, and locked with bed rails up x2.  Pt. instructed to call prior to getting OOB.  Pt. has call light and personal items within reach. VSS and afebrile this shift. Q1H rounding. All questions and concerns addressed at this time.

## 2024-09-22 NOTE — ASSESSMENT & PLAN NOTE
- Patient of Dr. 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 Today is Day +3.  - Patient is B+. Donor is A+.  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 Patient will start letermovir ppx on 9/24/24.  - Received fresh product last night [9/19/24] with a CD34 dose of 3.55 x10^6.  - Of note, cilostazol may interact with tacro. (Currently on hold for plts < 50K).  - See treatment plan below:    Planned conditioning regimen:  Fludarabine on Day -5, -4, -3, and -2  Melphalan on Day -2  TBI on Day -1     Planned  GVHD Prophylaxis:  Cyclophosphamide (with Mesna) on Days +3 and +4  Mycophenolate starting on Day +5  Tacrolimus starting on Day +5     Antimicrobial Prophylaxis:  Acyclovir starting on Day -5  Levofloxacin starting on Day -1  Micafungin on Day -1 through Day +4  Letermovir starting on Day +5 (if CMV PCR drawn on day 0 results negative)  Posaconazole starting on Day +5  Bactrim starting on Day +30     SOS/VOD Prophylaxis:  Ursodiol on Day -5 through Day +30      Growth Factor Support:  Neupogen starting on Day +5     Caregiver: wife   Post-transplant discharge plans: home

## 2024-09-22 NOTE — PLAN OF CARE
Plan of care reviewed with patient. Pt is day +3 Flu/Rosalee Haplo SCT. Pt remains afebrile. Free from falls or injury. No complaints of pain. No complaints of nausea. NS20K infusing at 75. Oral vanc given as scheduled. Cefepime given as scheduled. Bed locked in lowest position, non skid socks on, call light within reach. Pt instructed to call if any assistance is needed. Vitals stable. Daughter at bedside. Will cont to jackson pt.

## 2024-09-22 NOTE — PROGRESS NOTES
Janusz Ma - Oncology ([facility])  Hematology  Bone Marrow Transplant  Progress Note    Patient Name: Guillaume Salinas  Admission Date: 9/13/2024  Hospital Length of Stay: 9 days  Code Status: Full Code    Subjective:     Interval History:  Day +3 for a  TBI PT Cy haplo (son) allogeneic BMT for MDS.  Tmax of 99.5 overnight. Continues to rest well overnight. 1 headache relieved with pain meds. No further loose stools. Has hesitancy while urinating, feels this is due to trying to pee while laying down, but he does not want to try flomax.    Objective:     Vital Signs (Most Recent):  Temp: 98.3 °F (36.8 °C) (09/22/24 1209)  Pulse: 86 (09/22/24 1209)  Resp: 20 (09/22/24 1209)  BP: 120/75 (09/22/24 1209)  SpO2: 95 % (09/22/24 1209) Vital Signs (24h Range):  Temp:  [98.2 °F (36.8 °C)-99.5 °F (37.5 °C)] 98.3 °F (36.8 °C)  Pulse:  [81-97] 86  Resp:  [15-20] 20  SpO2:  [93 %-98 %] 95 %  BP: (109-138)/(65-79) 120/75     Weight: 77.3 kg (170 lb 6.7 oz) (pt also had additional blankets in bed with him; unstable gait so pt can not stand on scale)  Body mass index is 25.17 kg/m².  Body surface area is 1.94 meters squared.    ECOG SCORE           [unfilled]    Intake/Output - Last 3 Shifts         09/20 0700  09/21 0659 09/21 0700 09/22 0659 09/22 0700 09/23 0659    P.O. 1040 1100 300    I.V. (mL/kg) 2804.6 (36.3) 1611.5 (20.8)     Blood 653.8      IV Piggyback 393.6 156.9     Total Intake(mL/kg) 4891.9 (63.3) 2868.4 (37.1) 300 (3.9)    Urine (mL/kg/hr) 2570 (1.4) 3880 (2.1) 1350 (2.6)    Stool 0 0     Total Output 2570 3880 1350    Net +2321.9 -1011.6 -1050           Stool Occurrence 0 x 2 x              Physical Exam  Constitutional:       Appearance: He is well-developed.   HENT:      Head: Normocephalic and atraumatic.      Mouth/Throat:      Pharynx: No oropharyngeal exudate.   Eyes:      General:         Right eye: No discharge.         Left eye: No discharge.      Conjunctiva/sclera: Conjunctivae normal.       Pupils: Pupils are equal, round, and reactive to light.   Cardiovascular:      Rate and Rhythm: Normal rate and regular rhythm.      Heart sounds: Normal heart sounds. No murmur heard.  Pulmonary:      Effort: Pulmonary effort is normal. No respiratory distress.      Breath sounds: Normal breath sounds. No wheezing or rales.   Abdominal:      General: Bowel sounds are normal. There is no distension.      Palpations: Abdomen is soft.      Tenderness: There is no abdominal tenderness.   Musculoskeletal:         General: No deformity. Normal range of motion.      Cervical back: Normal range of motion and neck supple.   Skin:     General: Skin is warm and dry.      Findings: No erythema or rash.      Comments: Right chest wall vas cath. Dressing c/d/i. No sign of infection to site.   Neurological:      Mental Status: He is alert and oriented to person, place, and time.   Psychiatric:         Behavior: Behavior normal.         Thought Content: Thought content normal.         Judgment: Judgment normal.            Significant Labs:   CBC:   Recent Labs   Lab 09/21/24  0304 09/22/24  0353   WBC 0.05* 0.03*   HGB 8.1* 8.3*   HCT 24.9* 25.7*   PLT 26* 18*    and CMP:   Recent Labs   Lab 09/21/24  0304 09/22/24  0353    138   K 4.0 4.2    106   CO2 26 24   * 136*   BUN 9 7*   CREATININE 0.7 0.7   CALCIUM 8.3* 8.6*   PROT 5.0* 5.2*   ALBUMIN 2.6* 2.7*   BILITOT 0.5 0.4   ALKPHOS 57 54*   AST 25 19   ALT 20 18   ANIONGAP 5* 8       Diagnostic Results:  I have reviewed all pertinent imaging results/findings within the past 24 hours.  Assessment/Plan:     * Stem cell transplant candidate  - Patient of Dr. 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 Today is Day +3.  - Patient is B+. Donor is A+.  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 Patient will start letermovir ppx on 9/24/24.  - Received fresh product last night [9/19/24] with a CD34 dose of 3.55  x10^6.  - Of note, cilostazol may interact with tacro. (Currently on hold for plts < 50K).  - See treatment plan below:    Planned conditioning regimen:  Fludarabine on Day -5, -4, -3, and -2  Melphalan on Day -2  TBI on Day -1     Planned  GVHD Prophylaxis:  Cyclophosphamide (with Mesna) on Days +3 and +4  Mycophenolate starting on Day +5  Tacrolimus starting on Day +5     Antimicrobial Prophylaxis:  Acyclovir starting on Day -5  Levofloxacin starting on Day -1  Micafungin on Day -1 through Day +4  Letermovir starting on Day +5 (if CMV PCR drawn on day 0 results negative)  Posaconazole starting on Day +5  Bactrim starting on Day +30     SOS/VOD Prophylaxis:  Ursodiol on Day -5 through Day +30      Growth Factor Support:  Neupogen starting on Day +5     Caregiver: wife   Post-transplant discharge plans: home        Neutropenic fever  --T max of 99.5 overnight   --Infectious workup less thank 24 hours prior  --Continue Cefepime. Will add Vanc with an additional fever    Insomnia  - Continue home PRN Trazodone    History of Clostridioides difficile infection  - Continue oral vanc ppx per transplant protocol    Neuropathy  - Continue home gabapentin    Pancytopenia  - Expected to worsen following chemotherapy  - Daily CBC while inpatient  - Transfuse for hgb <7 Plt  or <10K  - Continue antimicrobial ppx  - Holding cilostazol for plts < 50K    Myelodysplastic syndrome  From Dr Hickey's clinic note 8/5:  Stem cell transplant candidate: We discussed the role for allogeneic stem cell transplantation in this disease process as a potentially curative option. We had an extensive discussion about the rationale, logistics, risks, and benefits. We reviewed the requirement to stay in the [place] area for 100 days with a caregiver at all times. We discussed the risks, including infection, graft failure, organ toxicity, graft versus host disease, relapse of disease, and secondary cancers. We reviewed the need for long-term  immunosuppression and need for close monitoring. HCT-CI of 1 (intermediate risk). We had a prolonged discussion today regarding his recent deconditioning, Cdiff, and underlying disease. He is now much improved since last seen, and is improving his strength since starting to work with PT. Diarrhea now controlled. We discussed that our plan to move forward with the transplant process.   Coordinator: Fay Stephens  Regimen:  + 2Gy TBI  Donor: son (haplo)   Graft source: BM  - Admitted 9/13/24 for a  TBI PT Cy haplo (son) allogeneic BMT    Hypertension, essential  - Holding home Coreg for fluid responsive hypotension. Resume as indicated.    Coronary artery disease involving native coronary artery of native heart without angina pectoris  - Holding home Coreg for hypotension. Resume as indicated.        VTE Risk Mitigation (From admission, onward)           Ordered     heparin, porcine (PF) 100 unit/mL injection flush 300 Units  As needed (PRN)         09/13/24 7600                    Disposition: Will remain inpatient through engraftment    Mayuri Michel MD  Bone Marrow Transplant  Janusz freddy - Oncology ([facility])

## 2024-09-23 LAB
ABO + RH BLD: NORMAL
ALBUMIN SERPL BCP-MCNC: 2.7 G/DL (ref 3.5–5.2)
ALP SERPL-CCNC: 52 U/L (ref 55–135)
ALT SERPL W/O P-5'-P-CCNC: 15 U/L (ref 10–44)
ANION GAP SERPL CALC-SCNC: 6 MMOL/L (ref 8–16)
AST SERPL-CCNC: 17 U/L (ref 10–40)
BASOPHILS # BLD AUTO: 0 K/UL (ref 0–0.2)
BASOPHILS NFR BLD: 0 % (ref 0–1.9)
BILIRUB SERPL-MCNC: 0.7 MG/DL (ref 0.1–1)
BILIRUB UR QL STRIP: NEGATIVE
BLD GP AB SCN CELLS X3 SERPL QL: NORMAL
BLD PROD TYP BPU: NORMAL
BLOOD UNIT EXPIRATION DATE: NORMAL
BLOOD UNIT TYPE CODE: 5100
BLOOD UNIT TYPE: NORMAL
BUN SERPL-MCNC: 8 MG/DL (ref 8–23)
CALCIUM SERPL-MCNC: 8.7 MG/DL (ref 8.7–10.5)
CHLORIDE SERPL-SCNC: 106 MMOL/L (ref 95–110)
CLARITY UR REFRACT.AUTO: CLEAR
CO2 SERPL-SCNC: 24 MMOL/L (ref 23–29)
CODING SYSTEM: NORMAL
COLOR UR AUTO: COLORLESS
CREAT SERPL-MCNC: 0.7 MG/DL (ref 0.5–1.4)
CROSSMATCH INTERPRETATION: NORMAL
DIFFERENTIAL METHOD BLD: ABNORMAL
DISPENSE STATUS: NORMAL
EOSINOPHIL # BLD AUTO: 0 K/UL (ref 0–0.5)
EOSINOPHIL NFR BLD: 0 % (ref 0–8)
ERYTHROCYTE [DISTWIDTH] IN BLOOD BY AUTOMATED COUNT: 13.3 % (ref 11.5–14.5)
EST. GFR  (NO RACE VARIABLE): >60 ML/MIN/1.73 M^2
GLUCOSE SERPL-MCNC: 96 MG/DL (ref 70–110)
GLUCOSE UR QL STRIP: NEGATIVE
HCT VFR BLD AUTO: 24.7 % (ref 40–54)
HGB BLD-MCNC: 8.5 G/DL (ref 14–18)
HGB UR QL STRIP: NEGATIVE
IMM GRANULOCYTES # BLD AUTO: 0 K/UL (ref 0–0.04)
IMM GRANULOCYTES NFR BLD AUTO: 0 % (ref 0–0.5)
KETONES UR QL STRIP: ABNORMAL
LEUKOCYTE ESTERASE UR QL STRIP: NEGATIVE
LYMPHOCYTES # BLD AUTO: 0 K/UL (ref 1–4.8)
LYMPHOCYTES NFR BLD: 0 % (ref 18–48)
MAGNESIUM SERPL-MCNC: 1.9 MG/DL (ref 1.6–2.6)
MCH RBC QN AUTO: 30.7 PG (ref 27–31)
MCHC RBC AUTO-ENTMCNC: 34.4 G/DL (ref 32–36)
MCV RBC AUTO: 89 FL (ref 82–98)
MONOCYTES # BLD AUTO: 0 K/UL (ref 0.3–1)
MONOCYTES NFR BLD: 0 % (ref 4–15)
NEUTROPHILS # BLD AUTO: 0 K/UL (ref 1.8–7.7)
NEUTROPHILS NFR BLD: 100 % (ref 38–73)
NITRITE UR QL STRIP: NEGATIVE
NRBC BLD-RTO: 0 /100 WBC
PH UR STRIP: 7 [PH] (ref 5–8)
PHOSPHATE SERPL-MCNC: 3.4 MG/DL (ref 2.7–4.5)
PLATELET # BLD AUTO: 9 K/UL (ref 150–450)
PLATELET BLD QL SMEAR: ABNORMAL
PMV BLD AUTO: 12.6 FL (ref 9.2–12.9)
POTASSIUM SERPL-SCNC: 4.2 MMOL/L (ref 3.5–5.1)
PROT SERPL-MCNC: 5.6 G/DL (ref 6–8.4)
PROT UR QL STRIP: NEGATIVE
RBC # BLD AUTO: 2.77 M/UL (ref 4.6–6.2)
SODIUM SERPL-SCNC: 136 MMOL/L (ref 136–145)
SP GR UR STRIP: 1.01 (ref 1–1.03)
SPECIMEN OUTDATE: NORMAL
UNIT NUMBER: NORMAL
URN SPEC COLLECT METH UR: ABNORMAL
WBC # BLD AUTO: 0.01 K/UL (ref 3.9–12.7)

## 2024-09-23 PROCEDURE — 63600175 PHARM REV CODE 636 W HCPCS: Performed by: STUDENT IN AN ORGANIZED HEALTH CARE EDUCATION/TRAINING PROGRAM

## 2024-09-23 PROCEDURE — 85025 COMPLETE CBC W/AUTO DIFF WBC: CPT | Performed by: NURSE PRACTITIONER

## 2024-09-23 PROCEDURE — 86850 RBC ANTIBODY SCREEN: CPT | Performed by: INTERNAL MEDICINE

## 2024-09-23 PROCEDURE — 86900 BLOOD TYPING SEROLOGIC ABO: CPT | Performed by: INTERNAL MEDICINE

## 2024-09-23 PROCEDURE — 97110 THERAPEUTIC EXERCISES: CPT

## 2024-09-23 PROCEDURE — 25000003 PHARM REV CODE 250: Performed by: STUDENT IN AN ORGANIZED HEALTH CARE EDUCATION/TRAINING PROGRAM

## 2024-09-23 PROCEDURE — 87040 BLOOD CULTURE FOR BACTERIA: CPT | Performed by: STUDENT IN AN ORGANIZED HEALTH CARE EDUCATION/TRAINING PROGRAM

## 2024-09-23 PROCEDURE — 25000003 PHARM REV CODE 250: Performed by: NURSE PRACTITIONER

## 2024-09-23 PROCEDURE — 83735 ASSAY OF MAGNESIUM: CPT | Performed by: NURSE PRACTITIONER

## 2024-09-23 PROCEDURE — 27000207 HC ISOLATION

## 2024-09-23 PROCEDURE — 81003 URINALYSIS AUTO W/O SCOPE: CPT | Performed by: STUDENT IN AN ORGANIZED HEALTH CARE EDUCATION/TRAINING PROGRAM

## 2024-09-23 PROCEDURE — 30233R1 TRANSFUSION OF NONAUTOLOGOUS PLATELETS INTO PERIPHERAL VEIN, PERCUTANEOUS APPROACH: ICD-10-PCS | Performed by: INTERNAL MEDICINE

## 2024-09-23 PROCEDURE — 36415 COLL VENOUS BLD VENIPUNCTURE: CPT | Performed by: STUDENT IN AN ORGANIZED HEALTH CARE EDUCATION/TRAINING PROGRAM

## 2024-09-23 PROCEDURE — 80053 COMPREHEN METABOLIC PANEL: CPT | Performed by: NURSE PRACTITIONER

## 2024-09-23 PROCEDURE — P9073 PLATELETS PHERESIS PATH REDU: HCPCS | Mod: 91 | Performed by: STUDENT IN AN ORGANIZED HEALTH CARE EDUCATION/TRAINING PROGRAM

## 2024-09-23 PROCEDURE — 86901 BLOOD TYPING SEROLOGIC RH(D): CPT | Performed by: INTERNAL MEDICINE

## 2024-09-23 PROCEDURE — 63600175 PHARM REV CODE 636 W HCPCS: Performed by: INTERNAL MEDICINE

## 2024-09-23 PROCEDURE — 84100 ASSAY OF PHOSPHORUS: CPT | Performed by: NURSE PRACTITIONER

## 2024-09-23 PROCEDURE — 20600001 HC STEP DOWN PRIVATE ROOM

## 2024-09-23 PROCEDURE — 25000003 PHARM REV CODE 250: Performed by: INTERNAL MEDICINE

## 2024-09-23 PROCEDURE — P9073 PLATELETS PHERESIS PATH REDU: HCPCS | Performed by: STUDENT IN AN ORGANIZED HEALTH CARE EDUCATION/TRAINING PROGRAM

## 2024-09-23 PROCEDURE — 99233 SBSQ HOSP IP/OBS HIGH 50: CPT | Mod: ,,, | Performed by: INTERNAL MEDICINE

## 2024-09-23 RX ORDER — HYDROCODONE BITARTRATE AND ACETAMINOPHEN 500; 5 MG/1; MG/1
TABLET ORAL
Status: DISCONTINUED | OUTPATIENT
Start: 2024-09-23 | End: 2024-09-24

## 2024-09-23 RX ADMIN — LORAZEPAM 1 MG: 1 TABLET ORAL at 09:09

## 2024-09-23 RX ADMIN — ACYCLOVIR 800 MG: 200 CAPSULE ORAL at 09:09

## 2024-09-23 RX ADMIN — SODIUM CHLORIDE 500 ML: 9 INJECTION, SOLUTION INTRAVENOUS at 08:09

## 2024-09-23 RX ADMIN — CEFEPIME 2 G: 2 INJECTION, POWDER, FOR SOLUTION INTRAVENOUS at 04:09

## 2024-09-23 RX ADMIN — MESNA 707 MG: 100 INJECTION, SOLUTION INTRAVENOUS at 09:09

## 2024-09-23 RX ADMIN — Medication 400 MG: at 09:09

## 2024-09-23 RX ADMIN — ONDANSETRON 8 MG: 2 INJECTION INTRAMUSCULAR; INTRAVENOUS at 08:09

## 2024-09-23 RX ADMIN — SODIUM CHLORIDE 500 ML: 9 INJECTION, SOLUTION INTRAVENOUS at 12:09

## 2024-09-23 RX ADMIN — CEFEPIME 2 G: 2 INJECTION, POWDER, FOR SOLUTION INTRAVENOUS at 12:09

## 2024-09-23 RX ADMIN — MESNA 707 MG: 100 INJECTION, SOLUTION INTRAVENOUS at 01:09

## 2024-09-23 RX ADMIN — Medication 1 DOSE: at 09:09

## 2024-09-23 RX ADMIN — ACETAMINOPHEN 650 MG: 325 TABLET ORAL at 10:09

## 2024-09-23 RX ADMIN — URSODIOL 300 MG: 300 CAPSULE ORAL at 09:09

## 2024-09-23 RX ADMIN — Medication 400 MG: at 01:09

## 2024-09-23 RX ADMIN — PANTOPRAZOLE SODIUM 40 MG: 40 TABLET, DELAYED RELEASE ORAL at 09:09

## 2024-09-23 RX ADMIN — SODIUM CHLORIDE AND POTASSIUM CHLORIDE 75 ML/HR: .9; .15 SOLUTION INTRAVENOUS at 04:09

## 2024-09-23 RX ADMIN — TRAZODONE HYDROCHLORIDE 50 MG: 50 TABLET ORAL at 08:09

## 2024-09-23 RX ADMIN — GABAPENTIN 600 MG: 300 CAPSULE ORAL at 08:09

## 2024-09-23 RX ADMIN — DIPHENHYDRAMINE HYDROCHLORIDE 50 MG: 25 CAPSULE ORAL at 12:09

## 2024-09-23 RX ADMIN — GABAPENTIN 600 MG: 300 CAPSULE ORAL at 09:09

## 2024-09-23 RX ADMIN — ACYCLOVIR 800 MG: 200 CAPSULE ORAL at 08:09

## 2024-09-23 RX ADMIN — MESNA 707 MG: 100 INJECTION, SOLUTION INTRAVENOUS at 03:09

## 2024-09-23 RX ADMIN — CYCLOPHOSPHAMIDE 3500 MG: 200 INJECTION, SOLUTION INTRAVENOUS at 10:09

## 2024-09-23 RX ADMIN — Medication 1 DOSE: at 01:09

## 2024-09-23 RX ADMIN — MICAFUNGIN SODIUM 100 MG: 100 INJECTION, POWDER, LYOPHILIZED, FOR SOLUTION INTRAVENOUS at 09:09

## 2024-09-23 RX ADMIN — URSODIOL 300 MG: 300 CAPSULE ORAL at 08:09

## 2024-09-23 RX ADMIN — MESNA 707 MG: 100 INJECTION, SOLUTION INTRAVENOUS at 05:09

## 2024-09-23 RX ADMIN — CEFEPIME 2 G: 2 INJECTION, POWDER, FOR SOLUTION INTRAVENOUS at 08:09

## 2024-09-23 RX ADMIN — VANCOMYCIN HYDROCHLORIDE 125 MG: KIT at 09:09

## 2024-09-23 RX ADMIN — Medication 1 DOSE: at 05:09

## 2024-09-23 RX ADMIN — ONDANSETRON 8 MG: 2 INJECTION INTRAMUSCULAR; INTRAVENOUS at 04:09

## 2024-09-23 RX ADMIN — ONDANSETRON 8 MG: 2 INJECTION INTRAMUSCULAR; INTRAVENOUS at 01:09

## 2024-09-23 NOTE — PT/OT/SLP PROGRESS
Occupational Therapy   Treatment    Name: Guillaume Salinas  MRN: [id number]  Admitting Diagnosis:  Stem cell transplant candidate       Recommendations:     Discharge Recommendations: No Therapy Indicated  Discharge Equipment Recommendations:  none  Barriers to discharge:  None    Assessment:     Guillaume Salinas is a 69 y.o. male with a medical diagnosis of Stem cell transplant candidate.  He presents with limited session due to pt needing to be cleaned up (pt reported upon questioning twice during session that he wanted his daughter to assist rather than staff). Performance deficits affecting function are weakness, impaired endurance, impaired self care skills, impaired functional mobility, impaired balance, decreased lower extremity function, decreased upper extremity function.     Rehab Prognosis:  Good; patient would benefit from acute skilled OT services to address these deficits and reach maximum level of function.       Plan:     Patient to be seen 2 x/week to address the above listed problems via self-care/home management, therapeutic activities, therapeutic exercises, neuromuscular re-education  Plan of Care Expires: 10/15/24  Plan of Care Reviewed with: patient    Subjective     Chief Complaint: waiting for daughter to clean him up (daughter not present in room).  Patient/Family Comments/goals: Pt with no complaints.  Pain/Comfort:  Pain Rating 1: 0/10  Pain Rating Post-Intervention 1: 0/10    Objective:     Communicated with: RN prior to session.  Patient found supine with central line, PureWick (no family present) upon OT entry to room. Interpretor Severino # [id number] utilized during session.    General Precautions: Standard, fall    Orthopedic Precautions:N/A  Braces: N/A  Respiratory Status: Room air     Occupational Performance:       AMPAC 6 Click ADL: 19    Treatment & Education:  Pt declined EOB/OOB as waiting for daughter to come to clean him up (BM). Offered to assist pt or have  nursing staff assist him with hygiene twice during session however pt declined as wanting his daughter to assist (not present at time of session).  Notified RN.      Pt was agreeable to therex.  Pt performed AROM exercises with BUE in 3 planes x 2 sets x 10 reps each while in bed with HOB elevated.  Pt had no further questions & when asked whether there were any concerns pt reported none.      Patient left HOB elevated with all lines intact, call button in reach, and RN notified    GOALS:   Multidisciplinary Problems       Occupational Therapy Goals          Problem: Occupational Therapy    Goal Priority Disciplines Outcome Interventions   Occupational Therapy Goal     OT, PT/OT Progressing    Description: Goals to be met by: 10/29/2024     Patient will increase functional independence with ADLs by performing:    Pt will demonstrate independence with grooming x 3 sessions.  Pt will demonstrate independence with UE dressing x 3 sessions.  Pt will demonstrate independence with LE dressing x 3 sessions.  Pt will demonstrate independence with Toileting x 3 sessions.  Pt will demonstrate independence with transfers x 3 sessions.  Pt will complete functional mobility to simulate community distances with [place] x 3 sessions in order to maximize functional activity tolerance required for occupations of choice.   Pt will demonstrate independence with HEP for UE strengthening/endurance with independence.                           Time Tracking:     OT Date of Treatment: 09/23/24  OT Start Time: 1447  OT Stop Time: 1500  OT Total Time (min): 13 min    Billable Minutes:Therapeutic Exercise 13    OT/DIMITRIS: OT     Number of DIMITRIS visits since last OT visit: 1    9/23/2024

## 2024-09-23 NOTE — PLAN OF CARE
Day + 4 haplo SCT. Plan of care discussed with patient at start of shift. Free from falls and injuries. Resting quietly with eyes closed. Respirations even, unlabored. Skin warm and dry. Denies pain. Denies nausea. Bed alarm set. Family to remain at bedside. NS with 20 KCL infusing at 75 cc/hr. Frequent checks for pain and safety maintained. Bed in lowest position, wheels locked, side rails up x's 2, call light in reach. Instructed to call for assistance as needed, verbalizes understanding.

## 2024-09-23 NOTE — PROGRESS NOTES
09/23/24 0801   Vital Signs   Temp (!) 101.8 °F (38.8 °C)   Temp Source Oral   Pulse (!) 113   Heart Rate Source Monitor   Resp 18   SpO2 (!) 92 %   Pulse Oximetry Type Intermittent   Device (Oxygen Therapy) room air   BP (!) 105/56   MAP (mmHg) 73   BP Location Right arm   BP Method Automatic   Patient Position Lying   Biobeat   Systolic Average 107.33   Diastolic Average 61.67   Heart Rate/Pulse Average 106        Cardiac/Telemetry Details / Alarms   Cardiac/Telemetry Monitor On Yes   Cardiac/Telemetry Start time of monitoring 0705   Cardiac/Telemetry Audible Yes   Cardiac/Telemetry Alarms Set Yes   Cardiac/Telemetry Box Number [id number]   Assessments (Pre/Post)   Level of Consciousness (AVPU) alert     Notified BMT team.

## 2024-09-23 NOTE — PT/OT/SLP PROGRESS
Physical Therapy      Patient Name:  Guillaume Salinas   MRN:  [id number]    Patient not seen today secondary to Other (Comment) (per RN patient febrile and awaiting platelet transfusion at PT attempt 10:24 am, unable to return for 2nd PT attempt). Will follow-up as appropriate.

## 2024-09-23 NOTE — NURSING
Chemotherapy note:     cycloPHOSphamide 3,500 mg in 0.9% NaCl 582.5 mL chemo infusion started in right chest central line. Positive blood return noted. Pre meds given per treatment plan. Consent and CAR in chart. Dose and BSA verified by two chemo certified nurses. Chemotherapy education given before and during infusion. Chemotherapeutic precautions in place. Will continue to monitor.

## 2024-09-23 NOTE — PLAN OF CARE
Problem: Occupational Therapy  Goal: Occupational Therapy Goal  Description: Goals to be met by: 10/29/2024     Patient will increase functional independence with ADLs by performing:    Pt will demonstrate independence with grooming x 3 sessions.  Pt will demonstrate independence with UE dressing x 3 sessions.  Pt will demonstrate independence with LE dressing x 3 sessions.  Pt will demonstrate independence with Toileting x 3 sessions.  Pt will demonstrate independence with transfers x 3 sessions.  Pt will complete functional mobility to simulate community distances with [place] x 3 sessions in order to maximize functional activity tolerance required for occupations of choice.   Pt will demonstrate independence with HEP for UE strengthening/endurance with independence.      Outcome: Progressing     Goals updated.

## 2024-09-23 NOTE — PROGRESS NOTES
Janusz Ma - Oncology ([facility])  Hematology  Bone Marrow Transplant  Progress Note    Patient Name: Guillaume Salinas  Admission Date: 9/13/2024  Hospital Length of Stay: 10 days  Code Status: Full Code    Subjective:     Interval History:  Day +4  for a  TBI PT Cy haplo (son) allogeneic BMT for MDS.  Fatigued.   Anorexia. Fever this am.    Clinically stable.      Objective:     Vitals:    09/23/24 1527   BP: (!) 148/79   Pulse: 88   Resp: 18   Temp: 98 °F (36.7 °C)         Physical Exam  Constitutional:       Appearance: He is well-developed.   HENT:      Head: Normocephalic and atraumatic.      Mouth/Throat:      Pharynx: No oropharyngeal exudate.   Eyes:      General:         Right eye: No discharge.         Left eye: No discharge.      Conjunctiva/sclera: Conjunctivae normal.      Pupils: Pupils are equal, round, and reactive to light.   Cardiovascular:      Rate and Rhythm: Normal rate and regular rhythm.      Heart sounds: Normal heart sounds. No murmur heard.  Pulmonary:      Effort: Pulmonary effort is normal. No respiratory distress.      Breath sounds: Normal breath sounds. No wheezing or rales.   Abdominal:      General: Bowel sounds are normal. There is no distension.      Palpations: Abdomen is soft.      Tenderness: There is no abdominal tenderness.   Musculoskeletal:         General: No deformity. Normal range of motion.      Cervical back: Normal range of motion and neck supple.   Skin:     General: Skin is warm and dry.      Findings: No erythema or rash.      Comments: Right chest wall vas cath. Dressing c/d/i. No sign of infection to site.   Neurological:      Mental Status: He is alert and oriented to person, place, and time.   Psychiatric:         Behavior: Behavior normal.         Thought Content: Thought content normal.         Judgment: Judgment normal.            Significant Labs:      Lab Results   Component Value Date    WBC 0.01 (LL) 09/23/2024    HGB 8.5 (L) 09/23/2024     HCT 24.7 (L) 09/23/2024    MCV 89 09/23/2024    PLT 9 (LL) 09/23/2024       Gran # (ANC)   Date Value Ref Range Status   09/23/2024 0.0 (L) 1.8 - 7.7 K/uL Final     Gran %   Date Value Ref Range Status   09/23/2024 100.0 (H) 38.0 - 73.0 % Final     CMP  Sodium   Date Value Ref Range Status   09/23/2024 136 136 - 145 mmol/L Final     Potassium   Date Value Ref Range Status   09/23/2024 4.2 3.5 - 5.1 mmol/L Final     Chloride   Date Value Ref Range Status   09/23/2024 106 95 - 110 mmol/L Final     CO2   Date Value Ref Range Status   09/23/2024 24 23 - 29 mmol/L Final     Glucose   Date Value Ref Range Status   09/23/2024 96 70 - 110 mg/dL Final     BUN   Date Value Ref Range Status   09/23/2024 8 8 - 23 mg/dL Final     Creatinine   Date Value Ref Range Status   09/23/2024 0.7 0.5 - 1.4 mg/dL Final     Calcium   Date Value Ref Range Status   09/23/2024 8.7 8.7 - 10.5 mg/dL Final     Total Protein   Date Value Ref Range Status   09/23/2024 5.6 (L) 6.0 - 8.4 g/dL Final     Albumin   Date Value Ref Range Status   09/23/2024 2.7 (L) 3.5 - 5.2 g/dL Final     Total Bilirubin   Date Value Ref Range Status   09/23/2024 0.7 0.1 - 1.0 mg/dL Final     Comment:     For infants and newborns, interpretation of results should be based  on gestational age, weight and in agreement with clinical  observations.    Premature Infant recommended reference ranges:  Up to 24 hours.............<8.0 mg/dL  Up to 48 hours............<12.0 mg/dL  3-5 days..................<15.0 mg/dL  6-29 days.................<15.0 mg/dL       Alkaline Phosphatase   Date Value Ref Range Status   09/23/2024 52 (L) 55 - 135 U/L Final     AST   Date Value Ref Range Status   09/23/2024 17 10 - 40 U/L Final     ALT   Date Value Ref Range Status   09/23/2024 15 10 - 44 U/L Final     Anion Gap   Date Value Ref Range Status   09/23/2024 6 (L) 8 - 16 mmol/L Final     eGFR   Date Value Ref Range Status   09/23/2024 >60.0 >60 mL/min/1.73 m^2 Final           Diagnostic  Results:  I have reviewed all pertinent imaging results/findings within the past 24 hours.  Assessment/Plan:     * Stem cell transplant candidate  - Patient of Dr. 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 Today is Day +4  .  - Patient is B+. Donor is A+.  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 Patient will start letermovir ppx on 9/24/24.  - Received fresh product last night [9/19/24] with a CD34 dose of 3.55 x10^6.  - Of note, cilostazol may interact with tacro. (Currently on hold for plts < 50K).  - See treatment plan below:    Planned conditioning regimen:  Fludarabine on Day -5, -4, -3, and -2  Melphalan on Day -2  TBI on Day -1     Planned  GVHD Prophylaxis:  Cyclophosphamide (with Mesna) on Days +3 and +4  Mycophenolate starting on Day +5  Tacrolimus starting on Day +5     Antimicrobial Prophylaxis:  Acyclovir starting on Day -5  Levofloxacin starting on Day -1  Micafungin on Day -1 through Day +4  Letermovir starting on Day +5 (if CMV PCR drawn on day 0 results negative)  Posaconazole starting on Day +5  Bactrim starting on Day +30     SOS/VOD Prophylaxis:  Ursodiol on Day -5 through Day +30      Growth Factor Support:  Neupogen starting on Day +5     Caregiver: wife   Post-transplant discharge plans: home        Neutropenic fever  --febrile this am; likely haplostorm but pan culture and start cefepime   --Infectious workup less thank 24 hours prior  --Continue Cefepime. Will add Vanc with an additional fever    Insomnia  - Continue home PRN Trazodone    History of Clostridioides difficile infection  - Continue oral vanc ppx per transplant protocol    Neuropathy  - Continue home gabapentin    Pancytopenia  - Expected to worsen following chemotherapy  - Daily CBC while inpatient  - Transfuse for hgb <7 Plt  or <10K  - Continue antimicrobial ppx  - Holding cilostazol for plts < 50K    Myelodysplastic syndrome  From Dr Hickey's clinic note  8/5:  Stem cell transplant candidate: We discussed the role for allogeneic stem cell transplantation in this disease process as a potentially curative option. We had an extensive discussion about the rationale, logistics, risks, and benefits. We reviewed the requirement to stay in the [place] area for 100 days with a caregiver at all times. We discussed the risks, including infection, graft failure, organ toxicity, graft versus host disease, relapse of disease, and secondary cancers. We reviewed the need for long-term immunosuppression and need for close monitoring. HCT-CI of 1 (intermediate risk). We had a prolonged discussion today regarding his recent deconditioning, Cdiff, and underlying disease. He is now much improved since last seen, and is improving his strength since starting to work with PT. Diarrhea now controlled. We discussed that our plan to move forward with the transplant process.   Coordinator: Fay Stephens  Regimen:  + 2Gy TBI  Donor: son (haplo)   Graft source: BM  - Admitted 9/13/24 for a  TBI PT Cy haplo (son) allogeneic BMT    Hypertension, essential  - Holding home Coreg for fluid responsive hypotension. Resume as indicated.    Coronary artery disease involving native coronary artery of native heart without angina pectoris  - Holding home Coreg for hypotension. Resume as indicated.        VTE Risk Mitigation (From admission, onward)           Ordered     heparin, porcine (PF) 100 unit/mL injection flush 300 Units  As needed (PRN)         09/13/24 3022                    Disposition: Will remain inpatient through engraftment    Inder Best MD  Hematology & Stem Cell Transplant

## 2024-09-23 NOTE — PROGRESS NOTES
09/23/24 1111 09/23/24 1119   Vital Signs   BP (!) 87/44 (!) 105/56   MAP (mmHg) 59 72   BP Location  --  Right arm   BP Method  --  Automatic   Patient Position  --  Lying     Notified ALLYSSA Palacios.

## 2024-09-23 NOTE — CARE UPDATE
"RAPID RESPONSE NURSE CHART REVIEW        Chart Reviewed: 09/23/2024, 12:51 PM    MRN: [id number]  Bed: 844/844 A    Dx: Stem cell transplant candidate    Guillaume Salinas has a past medical history of Anticoagulant long-term use, Coronary artery disease, Hypertension, Myelodysplastic syndrome, and Peripheral vascular disease, unspecified.    Last VS: BP (!) 105/56 (BP Location: Right arm, Patient Position: Lying)   Pulse 102   Temp 99.1 °F (37.3 °C) (Oral)   Resp 18   Ht 5' 9" (1.753 m)   Wt 76.1 kg (167 lb 12.3 oz)   SpO2 95%   BMI 24.78 kg/m²     24H Vital Sign Range:  Temp:  [98.6 °F (37 °C)-101.8 °F (38.8 °C)]   Pulse:  []   Resp:  [18]   BP: ()/(44-88)   SpO2:  [92 %-97 %]     Level of Consciousness (AVPU): alert    Recent Labs     09/21/24  0304 09/22/24  0353 09/23/24  0424   WBC 0.05* 0.03* 0.01*   HGB 8.1* 8.3* 8.5*   HCT 24.9* 25.7* 24.7*   PLT 26* 18* 9*       Recent Labs     09/21/24  0304 09/22/24  0353 09/23/24  0424    138 136   K 4.0 4.2 4.2    106 106   CO2 26 24 24   BUN 9 7* 8   CREATININE 0.7 0.7 0.7   * 136* 96   PHOS 4.3 3.1 3.4   MG 2.1 1.9 1.9        OXYGEN:  Flow (L/min) (Oxygen Therapy): 2    MEWS score: 2    Charge Jerica CHAMORRO contacted for tachycardia and fever reports she will follow up with RN for Tylenol administration.. No additional concerns verbalized at this time. Instructed to call [phone] for further concerns or assistance.    Ping Benitez RN        " Name band;

## 2024-09-23 NOTE — PLAN OF CARE
D+4 Haplo SCT. Patient AAOx4. POC reviewed with patient; understanding verbalized. Wolof interpretor used as needed throughout shift. Chemo infused. NS w/20 K infusing @ 75mL/hr. Telemetry continuous. Fair oral intake. Good urine output. Bed alarm set and family assist patient to restroom. Pt and family educated on fall risk. Pt. with nonskid footwear on, bed in lowest position, and locked with bed rails up x2.  Pt. instructed to call prior to getting OOB.  Pt. has call light and personal items within reach. VSS and afebrile this shift. TMAX 101.8, notified BMT team, chest Xray done, blood culture and urinalysis sen, tylenol provided. PPO electrolyte replaced. 1 U platelet transfused, premedicated before transfusion, patient tolerated well.IV antibiotics provided.  Q1H rounding. All questions and concerns addressed at this time.

## 2024-09-24 PROBLEM — R19.7 DIARRHEA: Status: ACTIVE | Noted: 2024-09-24

## 2024-09-24 LAB
ALBUMIN SERPL BCP-MCNC: 2.6 G/DL (ref 3.5–5.2)
ALP SERPL-CCNC: 48 U/L (ref 55–135)
ALT SERPL W/O P-5'-P-CCNC: 14 U/L (ref 10–44)
ANION GAP SERPL CALC-SCNC: 6 MMOL/L (ref 8–16)
ANISOCYTOSIS BLD QL SMEAR: SLIGHT
AST SERPL-CCNC: 15 U/L (ref 10–40)
BASOPHILS # BLD AUTO: 0 K/UL (ref 0–0.2)
BASOPHILS NFR BLD: 0 % (ref 0–1.9)
BILIRUB SERPL-MCNC: 0.5 MG/DL (ref 0.1–1)
BUN SERPL-MCNC: 10 MG/DL (ref 8–23)
C DIFF GDH STL QL: NEGATIVE
C DIFF TOX A+B STL QL IA: NEGATIVE
CALCIUM SERPL-MCNC: 8.4 MG/DL (ref 8.7–10.5)
CHLORIDE SERPL-SCNC: 107 MMOL/L (ref 95–110)
CO2 SERPL-SCNC: 21 MMOL/L (ref 23–29)
CREAT SERPL-MCNC: 0.7 MG/DL (ref 0.5–1.4)
DIFFERENTIAL METHOD BLD: ABNORMAL
EOSINOPHIL # BLD AUTO: 0 K/UL (ref 0–0.5)
EOSINOPHIL NFR BLD: 0 % (ref 0–8)
ERYTHROCYTE [DISTWIDTH] IN BLOOD BY AUTOMATED COUNT: 13.3 % (ref 11.5–14.5)
EST. GFR  (NO RACE VARIABLE): >60 ML/MIN/1.73 M^2
GLUCOSE SERPL-MCNC: 94 MG/DL (ref 70–110)
HCT VFR BLD AUTO: 22.8 % (ref 40–54)
HGB BLD-MCNC: 7.8 G/DL (ref 14–18)
HYPOCHROMIA BLD QL SMEAR: ABNORMAL
IMM GRANULOCYTES # BLD AUTO: 0 K/UL (ref 0–0.04)
IMM GRANULOCYTES NFR BLD AUTO: 0 % (ref 0–0.5)
LYMPHOCYTES # BLD AUTO: 0 K/UL (ref 1–4.8)
LYMPHOCYTES NFR BLD: 0 % (ref 18–48)
MAGNESIUM SERPL-MCNC: 2 MG/DL (ref 1.6–2.6)
MCH RBC QN AUTO: 30.4 PG (ref 27–31)
MCHC RBC AUTO-ENTMCNC: 34.2 G/DL (ref 32–36)
MCV RBC AUTO: 89 FL (ref 82–98)
MONOCYTES # BLD AUTO: 0 K/UL (ref 0.3–1)
MONOCYTES NFR BLD: 0 % (ref 4–15)
NEUTROPHILS # BLD AUTO: 0 K/UL (ref 1.8–7.7)
NEUTROPHILS NFR BLD: 100 % (ref 38–73)
NRBC BLD-RTO: 0 /100 WBC
OVALOCYTES BLD QL SMEAR: ABNORMAL
PHOSPHATE SERPL-MCNC: 2.8 MG/DL (ref 2.7–4.5)
PLATELET # BLD AUTO: 22 K/UL (ref 150–450)
PMV BLD AUTO: 11.4 FL (ref 9.2–12.9)
POIKILOCYTOSIS BLD QL SMEAR: SLIGHT
POLYCHROMASIA BLD QL SMEAR: ABNORMAL
POTASSIUM SERPL-SCNC: 3.9 MMOL/L (ref 3.5–5.1)
PROT SERPL-MCNC: 5.5 G/DL (ref 6–8.4)
RBC # BLD AUTO: 2.57 M/UL (ref 4.6–6.2)
SODIUM SERPL-SCNC: 134 MMOL/L (ref 136–145)
SPHEROCYTES BLD QL SMEAR: ABNORMAL
WBC # BLD AUTO: 0.01 K/UL (ref 3.9–12.7)

## 2024-09-24 PROCEDURE — 63600175 PHARM REV CODE 636 W HCPCS: Performed by: INTERNAL MEDICINE

## 2024-09-24 PROCEDURE — 25000003 PHARM REV CODE 250: Performed by: INTERNAL MEDICINE

## 2024-09-24 PROCEDURE — 80053 COMPREHEN METABOLIC PANEL: CPT | Performed by: NURSE PRACTITIONER

## 2024-09-24 PROCEDURE — 25000003 PHARM REV CODE 250: Performed by: NURSE PRACTITIONER

## 2024-09-24 PROCEDURE — 85025 COMPLETE CBC W/AUTO DIFF WBC: CPT | Performed by: NURSE PRACTITIONER

## 2024-09-24 PROCEDURE — 99233 SBSQ HOSP IP/OBS HIGH 50: CPT | Mod: FS,,, | Performed by: INTERNAL MEDICINE

## 2024-09-24 PROCEDURE — 20600001 HC STEP DOWN PRIVATE ROOM

## 2024-09-24 PROCEDURE — 87324 CLOSTRIDIUM AG IA: CPT | Performed by: STUDENT IN AN ORGANIZED HEALTH CARE EDUCATION/TRAINING PROGRAM

## 2024-09-24 PROCEDURE — 83735 ASSAY OF MAGNESIUM: CPT | Performed by: NURSE PRACTITIONER

## 2024-09-24 PROCEDURE — 25000003 PHARM REV CODE 250: Performed by: STUDENT IN AN ORGANIZED HEALTH CARE EDUCATION/TRAINING PROGRAM

## 2024-09-24 PROCEDURE — 27000207 HC ISOLATION

## 2024-09-24 PROCEDURE — 87449 NOS EACH ORGANISM AG IA: CPT | Performed by: STUDENT IN AN ORGANIZED HEALTH CARE EDUCATION/TRAINING PROGRAM

## 2024-09-24 PROCEDURE — 63600175 PHARM REV CODE 636 W HCPCS: Performed by: STUDENT IN AN ORGANIZED HEALTH CARE EDUCATION/TRAINING PROGRAM

## 2024-09-24 PROCEDURE — 36415 COLL VENOUS BLD VENIPUNCTURE: CPT | Performed by: NURSE PRACTITIONER

## 2024-09-24 PROCEDURE — 25000003 PHARM REV CODE 250

## 2024-09-24 PROCEDURE — 84100 ASSAY OF PHOSPHORUS: CPT | Performed by: NURSE PRACTITIONER

## 2024-09-24 RX ADMIN — ACYCLOVIR 800 MG: 200 CAPSULE ORAL at 08:09

## 2024-09-24 RX ADMIN — MYCOPHENOLATE MOFETIL 1000 MG: 250 CAPSULE ORAL at 03:09

## 2024-09-24 RX ADMIN — URSODIOL 300 MG: 300 CAPSULE ORAL at 09:09

## 2024-09-24 RX ADMIN — FILGRASTIM 300 MCG: 300 INJECTION, SOLUTION INTRAVENOUS; SUBCUTANEOUS at 12:09

## 2024-09-24 RX ADMIN — MYCOPHENOLATE MOFETIL 1000 MG: 250 CAPSULE ORAL at 08:09

## 2024-09-24 RX ADMIN — POSACONAZOLE 300 MG: 100 TABLET, DELAYED RELEASE ORAL at 08:09

## 2024-09-24 RX ADMIN — Medication 1 DOSE: at 08:09

## 2024-09-24 RX ADMIN — ONDANSETRON 8 MG: 2 INJECTION INTRAMUSCULAR; INTRAVENOUS at 08:09

## 2024-09-24 RX ADMIN — CEFEPIME 2 G: 2 INJECTION, POWDER, FOR SOLUTION INTRAVENOUS at 08:09

## 2024-09-24 RX ADMIN — URSODIOL 300 MG: 300 CAPSULE ORAL at 08:09

## 2024-09-24 RX ADMIN — PANTOPRAZOLE SODIUM 40 MG: 40 TABLET, DELAYED RELEASE ORAL at 09:09

## 2024-09-24 RX ADMIN — Medication 1 DOSE: at 12:09

## 2024-09-24 RX ADMIN — MICAFUNGIN SODIUM 100 MG: 100 INJECTION, POWDER, LYOPHILIZED, FOR SOLUTION INTRAVENOUS at 10:09

## 2024-09-24 RX ADMIN — GABAPENTIN 600 MG: 300 CAPSULE ORAL at 09:09

## 2024-09-24 RX ADMIN — LETERMOVIR 480 MG: 480 TABLET, FILM COATED ORAL at 09:09

## 2024-09-24 RX ADMIN — VANCOMYCIN HYDROCHLORIDE 125 MG: KIT at 08:09

## 2024-09-24 RX ADMIN — CEFEPIME 2 G: 2 INJECTION, POWDER, FOR SOLUTION INTRAVENOUS at 12:09

## 2024-09-24 RX ADMIN — ACYCLOVIR 800 MG: 200 CAPSULE ORAL at 09:09

## 2024-09-24 RX ADMIN — TRAMADOL HYDROCHLORIDE 50 MG: 50 TABLET, COATED ORAL at 09:09

## 2024-09-24 RX ADMIN — Medication 1 DOSE: at 09:09

## 2024-09-24 RX ADMIN — Medication: at 12:09

## 2024-09-24 RX ADMIN — VANCOMYCIN HYDROCHLORIDE 125 MG: KIT at 09:09

## 2024-09-24 RX ADMIN — TRAZODONE HYDROCHLORIDE 50 MG: 50 TABLET ORAL at 09:09

## 2024-09-24 RX ADMIN — ONDANSETRON 8 MG: 2 INJECTION INTRAMUSCULAR; INTRAVENOUS at 03:09

## 2024-09-24 RX ADMIN — TACROLIMUS 1 MG: 1 CAPSULE ORAL at 12:09

## 2024-09-24 RX ADMIN — SODIUM CHLORIDE AND POTASSIUM CHLORIDE 75 ML/HR: .9; .15 SOLUTION INTRAVENOUS at 03:09

## 2024-09-24 RX ADMIN — GABAPENTIN 600 MG: 300 CAPSULE ORAL at 08:09

## 2024-09-24 RX ADMIN — Medication: at 08:09

## 2024-09-24 RX ADMIN — ONDANSETRON 8 MG: 2 INJECTION INTRAMUSCULAR; INTRAVENOUS at 12:09

## 2024-09-24 RX ADMIN — CEFEPIME 2 G: 2 INJECTION, POWDER, FOR SOLUTION INTRAVENOUS at 03:09

## 2024-09-24 RX ADMIN — Medication 1 DOSE: at 05:09

## 2024-09-24 RX ADMIN — TACROLIMUS 1 MG: 1 CAPSULE ORAL at 05:09

## 2024-09-24 NOTE — ASSESSMENT & PLAN NOTE
- Afebrile over night. Last fever was > 24 hours ago.  - Infection w/u neg thus far.  - Continue Cefepime

## 2024-09-24 NOTE — ASSESSMENT & PLAN NOTE
From Dr Hickey's clinic note 8/5:  Stem cell transplant candidate: We discussed the role for allogeneic stem cell transplantation in this disease process as a potentially curative option. We had an extensive discussion about the rationale, logistics, risks, and benefits. We reviewed the requirement to stay in the [place] area for 100 days with a caregiver at all times. We discussed the risks, including infection, graft failure, organ toxicity, graft versus host disease, relapse of disease, and secondary cancers. We reviewed the need for long-term immunosuppression and need for close monitoring. HCT-CI of 1 (intermediate risk). We had a prolonged discussion today regarding his recent deconditioning, Cdiff, and underlying disease. He is now much improved since last seen, and is improving his strength since starting to work with PT. Diarrhea now controlled. We discussed that our plan to move forward with the transplant process.   Coordinator: Fay Stephens  Regimen:  + 2Gy TBI  Donor: son (haplo)   Graft source: BM  - Admitted 9/13/24 for a  TBI PT Cy haplo (son) allogeneic BMT

## 2024-09-24 NOTE — ASSESSMENT & PLAN NOTE
- May be chemo-induced, but will r/o c-diff prior to starting antidiarrheal medication.  - Of note, has hx of c-diff and is receiving oral vanc ppx.

## 2024-09-24 NOTE — PLAN OF CARE
Problem: Adult Inpatient Plan of Care  Goal: Plan of Care Review  Outcome: Progressing  Goal: Patient-Specific Goal (Individualized)  Outcome: Progressing  Goal: Absence of Hospital-Acquired Illness or Injury  Outcome: Progressing  Goal: Optimal Comfort and Wellbeing  Outcome: Progressing  Goal: Readiness for Transition of Care  Outcome: Progressing     Problem: Infection  Goal: Absence of Infection Signs and Symptoms  Outcome: Progressing     Problem: Wound  Goal: Absence of Infection Signs and Symptoms  Outcome: Progressing  Goal: Optimal Pain Control and Function  Outcome: Progressing  Goal: Skin Health and Integrity  Outcome: Progressing  Goal: Optimal Wound Healing  Outcome: Progressing     Problem: Stem Cell/Bone Marrow Transplant  Goal: Optimal Coping with Transplant  Outcome: Progressing  Goal: Symptom-Free Urinary Elimination  Outcome: Progressing  Goal: Diarrhea Symptom Control  Outcome: Progressing  Goal: Improved Activity Tolerance  Outcome: Progressing  Goal: Blood Counts Within Acceptable Range  Outcome: Progressing  Goal: Absence of Hypersensitivity Reaction  Outcome: Progressing  Goal: Absence of Infection  Outcome: Progressing  Goal: Improved Oral Mucous Membrane Health and Integrity  Outcome: Progressing  Goal: Nausea and Vomiting Symptom Relief  Outcome: Progressing  Goal: Optimal Nutrition Intake  Outcome: Progressing     Problem: Fall Injury Risk  Goal: Absence of Fall and Fall-Related Injury  Outcome: Progressing

## 2024-09-24 NOTE — ASSESSMENT & PLAN NOTE
- Patient of Dr. 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 Today is Day +5.  - Patient is B+. Donor is A+.  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 Patient will start letermovir ppx on 9/24/24.  - Received fresh product last night [9/19/24] with a CD34 dose of 3.55 x10^6.  - Of note, cilostazol may interact with tacro. (Currently on hold for plts < 50K).  - See treatment plan below:    Planned conditioning regimen:  Fludarabine on Day -5, -4, -3, and -2  Melphalan on Day -2  TBI on Day -1     Planned  GVHD Prophylaxis:  Cyclophosphamide (with Mesna) on Days +3 and +4  Mycophenolate starting on Day +5  Tacrolimus starting on Day +5     Antimicrobial Prophylaxis:  Acyclovir starting on Day -5  Levofloxacin starting on Day -1  Micafungin on Day -1 through Day +4  Letermovir starting on Day +5 (if CMV PCR drawn on day 0 results negative)  Posaconazole starting on Day +5  Bactrim starting on Day +30     SOS/VOD Prophylaxis:  Ursodiol on Day -5 through Day +30      Growth Factor Support:  Neupogen starting on Day +5     Caregiver: wife   Post-transplant discharge plans: home

## 2024-09-24 NOTE — SUBJECTIVE & OBJECTIVE
Subjective:     Interval History: Day +5 from a  TBI PT Cy haplo (son) allogeneic BMT for MDS. Afebrile over night. VSS. Last fever was > 24 hours ago. Infection w/u neg thus far. Feeling fatigued today. Having loose stools. Stool sample to r/o c-diff pending collection. Of note, has history of c-diff and is receiving ppx with oral vanc.    Objective:     Vital Signs (Most Recent):  Temp: 98.4 °F (36.9 °C) (09/24/24 0736)  Pulse: 87 (09/24/24 1101)  Resp: 18 (09/24/24 0952)  BP: 126/75 (09/24/24 0736)  SpO2: 96 % (09/24/24 0736) Vital Signs (24h Range):  Temp:  [97.3 °F (36.3 °C)-99.4 °F (37.4 °C)] 98.4 °F (36.9 °C)  Pulse:  [] 87  Resp:  [16-18] 18  SpO2:  [93 %-98 %] 96 %  BP: (101-148)/(54-79) 126/75     Weight: 76 kg (167 lb 8.8 oz)  Body mass index is 24.74 kg/m².  Body surface area is 1.92 meters squared.    ECOG SCORE           [unfilled]    Intake/Output - Last 3 Shifts         09/22 0700 09/23 0659 09/23 0700 09/24 0659 09/24 0700 09/25 0659    P.O. 1950 846     I.V. (mL/kg) 455.8 (6) 1477.9 (19.4)     Blood  262.1     IV Piggyback 2835.9 2105     Total Intake(mL/kg) 5241.7 (68.9) 4691 (61.7)     Urine (mL/kg/hr) 3840 (2.1) 3300 (1.8) 400 (1.1)    Stool  0 0    Total Output 3840 3300 400    Net +1401.7 +1391 -400           Urine Occurrence  420 x     Stool Occurrence  3 x 1 x             Physical Exam  Constitutional:       Appearance: He is well-developed.   HENT:      Head: Normocephalic and atraumatic.      Mouth/Throat:      Pharynx: No oropharyngeal exudate.   Eyes:      General:         Right eye: No discharge.         Left eye: No discharge.      Conjunctiva/sclera: Conjunctivae normal.      Pupils: Pupils are equal, round, and reactive to light.   Cardiovascular:      Rate and Rhythm: Normal rate and regular rhythm.      Heart sounds: Normal heart sounds. No murmur heard.  Pulmonary:      Effort: Pulmonary effort is normal. No respiratory distress.      Breath sounds: Normal breath  sounds. No wheezing or rales.   Abdominal:      General: Bowel sounds are normal. There is no distension.      Palpations: Abdomen is soft.      Tenderness: There is no abdominal tenderness.   Musculoskeletal:         General: No deformity. Normal range of motion.      Cervical back: Normal range of motion and neck supple.   Skin:     General: Skin is warm and dry.      Findings: No erythema or rash.      Comments: Right chest wall vas cath. Dressing c/d/i. No sign of infection to site.   Neurological:      Mental Status: He is alert and oriented to person, place, and time.   Psychiatric:         Behavior: Behavior normal.         Thought Content: Thought content normal.         Judgment: Judgment normal.            Significant Labs:   CBC:   Recent Labs   Lab 09/23/24  0424 09/24/24  0301   WBC 0.01* 0.01*   HGB 8.5* 7.8*   HCT 24.7* 22.8*   PLT 9* 22*    and CMP:   Recent Labs   Lab 09/23/24  0424 09/24/24  0301    134*   K 4.2 3.9    107   CO2 24 21*   GLU 96 94   BUN 8 10   CREATININE 0.7 0.7   CALCIUM 8.7 8.4*   PROT 5.6* 5.5*   ALBUMIN 2.7* 2.6*   BILITOT 0.7 0.5   ALKPHOS 52* 48*   AST 17 15   ALT 15 14   ANIONGAP 6* 6*       Diagnostic Results:  I have reviewed all pertinent imaging results/findings within the past 24 hours.

## 2024-09-24 NOTE — PLAN OF CARE
Day + 5 haplo SCT. Plan of care discussed with patient at start of shift. Free from falls and injuries. Resting quietly with eyes closed. Respirations even, unlabored. Skin warm and dry. Denies pain. Denies nausea. Family to remain at bedside. NS with 20 KCL infusing at 75 cc/hr. Frequent checks for pain and safety maintained. Bed in lowest position, wheels locked, side rails up x's 2, call light in reach. Instructed to call for assistance as needed, verbalizes understanding.

## 2024-09-24 NOTE — PLAN OF CARE
Assumed care of pt. 06:45-19:15  Pt involved in plan of care and communicating needs throughout shift.     - D +5 Haplo Allo SCT for MDS  - AAOx4; Filipino speaking  - c/o HA relieved by PRN tramadol  - Ambulates SBA;   - Reg. Diet; RA  - Remains afebrile  - Voids via RR; BM x2 this shift; x1 smear  - Contact + precautions remain in place; Cdiff sample sent to lab  - NSR on telemonitor + cont. Pulse ox  - Skin intact; LETS ordered for Hemorrhoids  - R CHRISTOPH vaughn w/NA + 20K @ 75    - q1h patient rounds. All VSS; no acute events so far this shift. Non skid socks on; Pt. Instructed to call if any assistance is needed. Pt remaining free from falls or injury; Bed locked in lowest position with bed alarm on and audible; side rails up x3 & all belongings including call light within reach; Instructed to call prior to getting OOB ; Plan of care ongoing; All needs met at this time.

## 2024-09-24 NOTE — PROGRESS NOTES
[facility] - Oncology ([facility])  Hematology  Bone Marrow Transplant  Progress Note    Patient Name: Guillaume Salinas  Admission Date: 9/13/2024  Hospital Length of Stay: 11 days  Code Status: Full Code    Subjective:     Interval History: Day +5 from a  TBI PT Cy haplo (son) allogeneic BMT for MDS. Afebrile over night. VSS. Last fever was > 24 hours ago. Infection w/u neg thus far. Feeling fatigued today. Having loose stools. Stool sample to r/o c-diff pending collection. Of note, has history of c-diff and is receiving ppx with oral vanc.    Objective:     Vital Signs (Most Recent):  Temp: 98.4 °F (36.9 °C) (09/24/24 0736)  Pulse: 87 (09/24/24 1101)  Resp: 18 (09/24/24 0952)  BP: 126/75 (09/24/24 0736)  SpO2: 96 % (09/24/24 0736) Vital Signs (24h Range):  Temp:  [97.3 °F (36.3 °C)-99.4 °F (37.4 °C)] 98.4 °F (36.9 °C)  Pulse:  [] 87  Resp:  [16-18] 18  SpO2:  [93 %-98 %] 96 %  BP: (101-148)/(54-79) 126/75     Weight: 76 kg (167 lb 8.8 oz)  Body mass index is 24.74 kg/m².  Body surface area is 1.92 meters squared.    ECOG SCORE           [unfilled]    Intake/Output - Last 3 Shifts         09/22 0700 09/23 0659 09/23 0700 09/24 0659 09/24 0700 09/25 0659    P.O. 1950 846     I.V. (mL/kg) 455.8 (6) 1477.9 (19.4)     Blood  262.1     IV Piggyback 2835.9 2105     Total Intake(mL/kg) 5241.7 (68.9) 4691 (61.7)     Urine (mL/kg/hr) 3840 (2.1) 3300 (1.8) 400 (1.1)    Stool  0 0    Total Output 3840 3300 400    Net +1401.7 +1391 -400           Urine Occurrence  420 x     Stool Occurrence  3 x 1 x             Physical Exam  Constitutional:       Appearance: He is well-developed.   HENT:      Head: Normocephalic and atraumatic.      Mouth/Throat:      Pharynx: No oropharyngeal exudate.   Eyes:      General:         Right eye: No discharge.         Left eye: No discharge.      Conjunctiva/sclera: Conjunctivae normal.      Pupils: Pupils are equal, round, and reactive to light.   Cardiovascular:      Rate and  Rhythm: Normal rate and regular rhythm.      Heart sounds: Normal heart sounds. No murmur heard.  Pulmonary:      Effort: Pulmonary effort is normal. No respiratory distress.      Breath sounds: Normal breath sounds. No wheezing or rales.   Abdominal:      General: Bowel sounds are normal. There is no distension.      Palpations: Abdomen is soft.      Tenderness: There is no abdominal tenderness.   Musculoskeletal:         General: No deformity. Normal range of motion.      Cervical back: Normal range of motion and neck supple.   Skin:     General: Skin is warm and dry.      Findings: No erythema or rash.      Comments: Right chest wall vas cath. Dressing c/d/i. No sign of infection to site.   Neurological:      Mental Status: He is alert and oriented to person, place, and time.   Psychiatric:         Behavior: Behavior normal.         Thought Content: Thought content normal.         Judgment: Judgment normal.            Significant Labs:   CBC:   Recent Labs   Lab 09/23/24  0424 09/24/24  0301   WBC 0.01* 0.01*   HGB 8.5* 7.8*   HCT 24.7* 22.8*   PLT 9* 22*    and CMP:   Recent Labs   Lab 09/23/24  0424 09/24/24  0301    134*   K 4.2 3.9    107   CO2 24 21*   GLU 96 94   BUN 8 10   CREATININE 0.7 0.7   CALCIUM 8.7 8.4*   PROT 5.6* 5.5*   ALBUMIN 2.7* 2.6*   BILITOT 0.7 0.5   ALKPHOS 52* 48*   AST 17 15   ALT 15 14   ANIONGAP 6* 6*       Diagnostic Results:  I have reviewed all pertinent imaging results/findings within the past 24 hours.  Assessment/Plan:     * Stem cell transplant candidate  - Patient of Dr. 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 Today is Day +5.  - Patient is B+. Donor is A+.  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 Patient will start letermovir ppx on 9/24/24.  - Received fresh product last night [9/19/24] with a CD34 dose of 3.55 x10^6.  - Of note, cilostazol may interact with tacro. (Currently on hold for plts <  50K).  - See treatment plan below:    Planned conditioning regimen:  Fludarabine on Day -5, -4, -3, and -2  Melphalan on Day -2  TBI on Day -1     Planned  GVHD Prophylaxis:  Cyclophosphamide (with Mesna) on Days +3 and +4  Mycophenolate starting on Day +5  Tacrolimus starting on Day +5     Antimicrobial Prophylaxis:  Acyclovir starting on Day -5  Levofloxacin starting on Day -1  Micafungin on Day -1 through Day +4  Letermovir starting on Day +5 (if CMV PCR drawn on day 0 results negative)  Posaconazole starting on Day +5  Bactrim starting on Day +30     SOS/VOD Prophylaxis:  Ursodiol on Day -5 through Day +30      Growth Factor Support:  Neupogen starting on Day +5     Caregiver: wife   Post-transplant discharge plans: home        Myelodysplastic syndrome  From Dr Hickey's clinic note 8/5:  Stem cell transplant candidate: We discussed the role for allogeneic stem cell transplantation in this disease process as a potentially curative option. We had an extensive discussion about the rationale, logistics, risks, and benefits. We reviewed the requirement to stay in the [place] area for 100 days with a caregiver at all times. We discussed the risks, including infection, graft failure, organ toxicity, graft versus host disease, relapse of disease, and secondary cancers. We reviewed the need for long-term immunosuppression and need for close monitoring. HCT-CI of 1 (intermediate risk). We had a prolonged discussion today regarding his recent deconditioning, Cdiff, and underlying disease. He is now much improved since last seen, and is improving his strength since starting to work with PT. Diarrhea now controlled. We discussed that our plan to move forward with the transplant process.   Coordinator: Fay Stephens  Regimen:  + 2Gy TBI  Donor: son (haplo)   Graft source: BM  - Admitted 9/13/24 for a  TBI PT Cy haplo (son) allogeneic BMT    Pancytopenia  - Expected to worsen following chemotherapy  - Daily CBC  while inpatient  - Transfuse for hgb <7 Plt  or <10K  - Continue antimicrobial ppx  - Holding cilostazol for plts < 50K    Diarrhea  - May be chemo-induced, but will r/o c-diff prior to starting antidiarrheal medication.  - Of note, has hx of c-diff and is receiving oral vanc ppx.    History of Clostridioides difficile infection  - Continue oral vanc ppx per transplant protocol  - Having loose stool. Stool sample to r/o c-diff pending collection.    Neutropenic fever  - Afebrile over night. Last fever was > 24 hours ago.  - Infection w/u neg thus far.  - Continue Cefepime    Coronary artery disease involving native coronary artery of native heart without angina pectoris  - Holding home Coreg for hypotension. Resume as indicated.    Insomnia  - Continue home PRN Trazodone    Neuropathy  - Continue home gabapentin    Hypertension, essential  - Holding home Coreg for fluid responsive hypotension. Resume as indicated.        VTE Risk Mitigation (From admission, onward)           Ordered     heparin, porcine (PF) 100 unit/mL injection flush 300 Units  As needed (PRN)         09/13/24 6180                    Disposition: Inpatient for allogeneic BMT. Awaiting neutrophil engraftment.    Luz Napoles, NP  Bone Marrow Transplant  [facility] - Oncology ([facility])

## 2024-09-24 NOTE — ASSESSMENT & PLAN NOTE
- Continue oral vanc ppx per transplant protocol  - Having loose stool. Stool sample to r/o c-diff pending collection.

## 2024-09-25 PROBLEM — R51.9 HEADACHE: Status: ACTIVE | Noted: 2024-09-25

## 2024-09-25 LAB
ALBUMIN SERPL BCP-MCNC: 2.6 G/DL (ref 3.5–5.2)
ALP SERPL-CCNC: 52 U/L (ref 55–135)
ALT SERPL W/O P-5'-P-CCNC: 12 U/L (ref 10–44)
ANION GAP SERPL CALC-SCNC: 4 MMOL/L (ref 8–16)
AST SERPL-CCNC: 14 U/L (ref 10–40)
BACTERIA BLD CULT: NORMAL
BACTERIA BLD CULT: NORMAL
BASOPHILS # BLD AUTO: 0 K/UL (ref 0–0.2)
BASOPHILS NFR BLD: 0 % (ref 0–1.9)
BILIRUB SERPL-MCNC: 0.6 MG/DL (ref 0.1–1)
BUN SERPL-MCNC: 12 MG/DL (ref 8–23)
CALCIUM SERPL-MCNC: 8.4 MG/DL (ref 8.7–10.5)
CHLORIDE SERPL-SCNC: 109 MMOL/L (ref 95–110)
CO2 SERPL-SCNC: 24 MMOL/L (ref 23–29)
CREAT SERPL-MCNC: 0.7 MG/DL (ref 0.5–1.4)
DIFFERENTIAL METHOD BLD: ABNORMAL
EOSINOPHIL # BLD AUTO: 0 K/UL (ref 0–0.5)
EOSINOPHIL NFR BLD: 0 % (ref 0–8)
ERYTHROCYTE [DISTWIDTH] IN BLOOD BY AUTOMATED COUNT: 13.3 % (ref 11.5–14.5)
EST. GFR  (NO RACE VARIABLE): >60 ML/MIN/1.73 M^2
GLUCOSE SERPL-MCNC: 102 MG/DL (ref 70–110)
HCT VFR BLD AUTO: 23 % (ref 40–54)
HGB BLD-MCNC: 8 G/DL (ref 14–18)
IMM GRANULOCYTES # BLD AUTO: 0 K/UL (ref 0–0.04)
IMM GRANULOCYTES NFR BLD AUTO: 0 % (ref 0–0.5)
LYMPHOCYTES # BLD AUTO: 0 K/UL (ref 1–4.8)
LYMPHOCYTES NFR BLD: 0 % (ref 18–48)
MAGNESIUM SERPL-MCNC: 2 MG/DL (ref 1.6–2.6)
MCH RBC QN AUTO: 30.8 PG (ref 27–31)
MCHC RBC AUTO-ENTMCNC: 34.8 G/DL (ref 32–36)
MCV RBC AUTO: 89 FL (ref 82–98)
MONOCYTES # BLD AUTO: 0 K/UL (ref 0.3–1)
MONOCYTES NFR BLD: 0 % (ref 4–15)
NEUTROPHILS # BLD AUTO: 0 K/UL (ref 1.8–7.7)
NEUTROPHILS NFR BLD: 100 % (ref 38–73)
NRBC BLD-RTO: 0 /100 WBC
PHOSPHATE SERPL-MCNC: 2 MG/DL (ref 2.7–4.5)
PLATELET # BLD AUTO: 15 K/UL (ref 150–450)
PMV BLD AUTO: 11.4 FL (ref 9.2–12.9)
POTASSIUM SERPL-SCNC: 4.2 MMOL/L (ref 3.5–5.1)
PROT SERPL-MCNC: 5.6 G/DL (ref 6–8.4)
RBC # BLD AUTO: 2.6 M/UL (ref 4.6–6.2)
SODIUM SERPL-SCNC: 137 MMOL/L (ref 136–145)
SPHEROCYTES BLD QL SMEAR: ABNORMAL
WBC # BLD AUTO: 0.02 K/UL (ref 3.9–12.7)

## 2024-09-25 PROCEDURE — 25000003 PHARM REV CODE 250

## 2024-09-25 PROCEDURE — 85025 COMPLETE CBC W/AUTO DIFF WBC: CPT | Performed by: NURSE PRACTITIONER

## 2024-09-25 PROCEDURE — 84100 ASSAY OF PHOSPHORUS: CPT | Performed by: NURSE PRACTITIONER

## 2024-09-25 PROCEDURE — 25000003 PHARM REV CODE 250: Performed by: NURSE PRACTITIONER

## 2024-09-25 PROCEDURE — 25000003 PHARM REV CODE 250: Performed by: INTERNAL MEDICINE

## 2024-09-25 PROCEDURE — 94761 N-INVAS EAR/PLS OXIMETRY MLT: CPT

## 2024-09-25 PROCEDURE — 63600175 PHARM REV CODE 636 W HCPCS: Performed by: STUDENT IN AN ORGANIZED HEALTH CARE EDUCATION/TRAINING PROGRAM

## 2024-09-25 PROCEDURE — 63600175 PHARM REV CODE 636 W HCPCS: Performed by: INTERNAL MEDICINE

## 2024-09-25 PROCEDURE — 97116 GAIT TRAINING THERAPY: CPT | Mod: CQ

## 2024-09-25 PROCEDURE — 80053 COMPREHEN METABOLIC PANEL: CPT | Performed by: NURSE PRACTITIONER

## 2024-09-25 PROCEDURE — 83735 ASSAY OF MAGNESIUM: CPT | Performed by: NURSE PRACTITIONER

## 2024-09-25 PROCEDURE — 25000003 PHARM REV CODE 250: Performed by: STUDENT IN AN ORGANIZED HEALTH CARE EDUCATION/TRAINING PROGRAM

## 2024-09-25 PROCEDURE — 20600001 HC STEP DOWN PRIVATE ROOM

## 2024-09-25 RX ORDER — LOPERAMIDE HYDROCHLORIDE 2 MG/1
2 CAPSULE ORAL 4 TIMES DAILY PRN
Status: DISCONTINUED | OUTPATIENT
Start: 2024-09-25 | End: 2024-09-26

## 2024-09-25 RX ORDER — LEVOFLOXACIN 500 MG/1
500 TABLET, FILM COATED ORAL DAILY
Status: DISCONTINUED | OUTPATIENT
Start: 2024-09-25 | End: 2024-10-10

## 2024-09-25 RX ADMIN — ACYCLOVIR 800 MG: 200 CAPSULE ORAL at 08:09

## 2024-09-25 RX ADMIN — Medication 1 DOSE: at 08:09

## 2024-09-25 RX ADMIN — Medication: at 08:09

## 2024-09-25 RX ADMIN — FILGRASTIM 300 MCG: 300 INJECTION, SOLUTION INTRAVENOUS; SUBCUTANEOUS at 08:09

## 2024-09-25 RX ADMIN — ONDANSETRON 8 MG: 2 INJECTION INTRAMUSCULAR; INTRAVENOUS at 04:09

## 2024-09-25 RX ADMIN — TRAMADOL HYDROCHLORIDE 50 MG: 50 TABLET, COATED ORAL at 05:09

## 2024-09-25 RX ADMIN — ONDANSETRON 8 MG: 2 INJECTION INTRAMUSCULAR; INTRAVENOUS at 12:09

## 2024-09-25 RX ADMIN — DIBASIC SODIUM PHOSPHATE, MONOBASIC POTASSIUM PHOSPHATE AND MONOBASIC SODIUM PHOSPHATE 2 TABLET: 852; 155; 130 TABLET ORAL at 12:09

## 2024-09-25 RX ADMIN — GABAPENTIN 600 MG: 300 CAPSULE ORAL at 08:09

## 2024-09-25 RX ADMIN — Medication 1 DOSE: at 12:09

## 2024-09-25 RX ADMIN — VANCOMYCIN HYDROCHLORIDE 125 MG: KIT at 08:09

## 2024-09-25 RX ADMIN — LETERMOVIR 480 MG: 480 TABLET, FILM COATED ORAL at 08:09

## 2024-09-25 RX ADMIN — MYCOPHENOLATE MOFETIL 1000 MG: 250 CAPSULE ORAL at 08:09

## 2024-09-25 RX ADMIN — LOPERAMIDE HYDROCHLORIDE 2 MG: 2 CAPSULE ORAL at 08:09

## 2024-09-25 RX ADMIN — ONDANSETRON 8 MG: 2 INJECTION INTRAMUSCULAR; INTRAVENOUS at 08:09

## 2024-09-25 RX ADMIN — SODIUM CHLORIDE AND POTASSIUM CHLORIDE 75 ML/HR: .9; .15 SOLUTION INTRAVENOUS at 05:09

## 2024-09-25 RX ADMIN — LEVOFLOXACIN 500 MG: 500 TABLET, FILM COATED ORAL at 12:09

## 2024-09-25 RX ADMIN — DIBASIC SODIUM PHOSPHATE, MONOBASIC POTASSIUM PHOSPHATE AND MONOBASIC SODIUM PHOSPHATE 2 TABLET: 852; 155; 130 TABLET ORAL at 09:09

## 2024-09-25 RX ADMIN — URSODIOL 300 MG: 300 CAPSULE ORAL at 08:09

## 2024-09-25 RX ADMIN — DIBASIC SODIUM PHOSPHATE, MONOBASIC POTASSIUM PHOSPHATE AND MONOBASIC SODIUM PHOSPHATE 2 TABLET: 852; 155; 130 TABLET ORAL at 05:09

## 2024-09-25 RX ADMIN — Medication 1 DOSE: at 05:09

## 2024-09-25 RX ADMIN — TACROLIMUS 1 MG: 1 CAPSULE ORAL at 08:09

## 2024-09-25 RX ADMIN — LOPERAMIDE HYDROCHLORIDE 2 MG: 2 CAPSULE ORAL at 05:09

## 2024-09-25 RX ADMIN — MYCOPHENOLATE MOFETIL 1000 MG: 250 CAPSULE ORAL at 03:09

## 2024-09-25 RX ADMIN — POSACONAZOLE 300 MG: 100 TABLET, DELAYED RELEASE ORAL at 08:09

## 2024-09-25 RX ADMIN — TACROLIMUS 1 MG: 1 CAPSULE ORAL at 05:09

## 2024-09-25 RX ADMIN — PANTOPRAZOLE SODIUM 40 MG: 40 TABLET, DELAYED RELEASE ORAL at 08:09

## 2024-09-25 RX ADMIN — TRAZODONE HYDROCHLORIDE 50 MG: 50 TABLET ORAL at 05:09

## 2024-09-25 RX ADMIN — SODIUM CHLORIDE AND POTASSIUM CHLORIDE 75 ML/HR: .9; .15 SOLUTION INTRAVENOUS at 07:09

## 2024-09-25 RX ADMIN — CEFEPIME 2 G: 2 INJECTION, POWDER, FOR SOLUTION INTRAVENOUS at 04:09

## 2024-09-25 NOTE — ASSESSMENT & PLAN NOTE
- C/o headache this morning.   - Daily coffee drinker. Improved after drinking coffee.  - Suspect caffeine withdrawal.  - Can start caffeine tablet if patient is unable to drink coffee due to chemo side effects.

## 2024-09-25 NOTE — SUBJECTIVE & OBJECTIVE
Subjective:     Interval History: Day +6 from a  TBI PT Cy haplo (son) allogeneic BMT for MDS. Remains afebrile. VSS. Last fever was > 48 hours ago. Infection w/u remains unremarkable. Stopping Cefepime and resuming LVQ ppx. Having c-diff neg diarrhea. Starting PRN imodium. C/o headache this morning. Daily coffee drinker. Reports that he did not drink coffee yesterday due to diarrhea. Headache improved after coffee this morning, so suspect caffeine withdrawal. Repleting phos. Encouraged to increase activity.    Objective:     Vital Signs (Most Recent):  Temp: 97.5 °F (36.4 °C) (09/25/24 1040)  Pulse: 82 (09/25/24 1126)  Resp: 18 (09/25/24 1040)  BP: 136/77 (09/25/24 1040)  SpO2: 98 % (09/25/24 1040) Vital Signs (24h Range):  Temp:  [97.5 °F (36.4 °C)-98.7 °F (37.1 °C)] 97.5 °F (36.4 °C)  Pulse:  [] 82  Resp:  [17-18] 18  SpO2:  [95 %-98 %] 98 %  BP: (117-145)/(68-83) 136/77     Weight: 76 kg (167 lb 8.8 oz)  Body mass index is 24.74 kg/m².  Body surface area is 1.92 meters squared.    ECOG SCORE           [unfilled]    Intake/Output - Last 3 Shifts         09/23 0700 09/24 0659 09/24 0700 09/25 0659 09/25 0700 09/26 0659    P.O. 846 300     I.V. (mL/kg) 1477.9 (19.4) 1594.6 (21)     Blood 262.1      IV Piggyback 2105 261.6     Total Intake(mL/kg) 4691 (61.7) 2156.2 (28.4)     Urine (mL/kg/hr) 3300 (1.8) 550 (0.3)     Stool 0 0     Total Output 3300 550     Net +1391 +1606.2            Urine Occurrence 420 x 531 x     Stool Occurrence 3 x 5 x 1 x             Physical Exam  Constitutional:       Appearance: He is well-developed.   HENT:      Head: Normocephalic and atraumatic.      Mouth/Throat:      Pharynx: No oropharyngeal exudate.   Eyes:      General:         Right eye: No discharge.         Left eye: No discharge.      Conjunctiva/sclera: Conjunctivae normal.      Pupils: Pupils are equal, round, and reactive to light.   Cardiovascular:      Rate and Rhythm: Normal rate and regular rhythm.       Heart sounds: Normal heart sounds. No murmur heard.  Pulmonary:      Effort: Pulmonary effort is normal. No respiratory distress.      Breath sounds: Normal breath sounds. No wheezing or rales.   Abdominal:      General: Bowel sounds are normal. There is no distension.      Palpations: Abdomen is soft.      Tenderness: There is no abdominal tenderness.   Musculoskeletal:         General: No deformity. Normal range of motion.      Cervical back: Normal range of motion and neck supple.   Skin:     General: Skin is warm and dry.      Findings: No erythema or rash.      Comments: Right chest wall vas cath. Dressing c/d/i. No sign of infection to site.   Neurological:      Mental Status: He is alert and oriented to person, place, and time.   Psychiatric:         Behavior: Behavior normal.         Thought Content: Thought content normal.         Judgment: Judgment normal.            Significant Labs:   CBC:   Recent Labs   Lab 09/24/24  0301 09/25/24  0448   WBC 0.01* 0.02*   HGB 7.8* 8.0*   HCT 22.8* 23.0*   PLT 22* 15*    and CMP:   Recent Labs   Lab 09/24/24  0301 09/25/24  0448   * 137   K 3.9 4.2    109   CO2 21* 24   GLU 94 102   BUN 10 12   CREATININE 0.7 0.7   CALCIUM 8.4* 8.4*   PROT 5.5* 5.6*   ALBUMIN 2.6* 2.6*   BILITOT 0.5 0.6   ALKPHOS 48* 52*   AST 15 14   ALT 14 12   ANIONGAP 6* 4*       Diagnostic Results:  I have reviewed all pertinent imaging results/findings within the past 24 hours.

## 2024-09-25 NOTE — PT/OT/SLP PROGRESS
"Physical Therapy Treatment    Patient Name:  Guillaume Salinas   MRN:  [id number]    Recommendations:     Discharge Recommendations: No Therapy Indicated  Discharge Equipment Recommendations: none  Barriers to discharge: None    Assessment:     Guillaume Salinas is a 69 y.o. male admitted with a medical diagnosis of Stem cell transplant candidate.  He presents with the following impairments/functional limitations: weakness, impaired endurance, impaired self care skills, impaired functional mobility, impaired balance, decreased lower extremity function, decreased upper extremity function.    Pt met sitting up in bedside chair and agreeable to session. Pt tolerates session well with emphasis on transfers and gait training. Pt making progress towards therapy goals as indicated by increased total ambulation distance. Pt will continue to benefit from skilled therapy services.    Rehab Prognosis: Good; patient would benefit from acute skilled PT services to address these deficits and reach maximum level of function.    Recent Surgery: * No surgery found *      Plan:     During this hospitalization, patient to be seen 2 x/week to address the identified rehab impairments via gait training, therapeutic activities, therapeutic exercises, neuromuscular re-education and progress toward the following goals:    Plan of Care Expires:  10/15/24    Subjective     Chief Complaint: none stated  Patient/Family Comments/goals: "Can someone bring me some some new linens?"  Pain/Comfort:  Pain Rating 1: 0/10  Pain Rating Post-Intervention 1: 0/10      Objective:     Communicated with RN (Jenaro) prior to session.  Patient found up in chair with central line, PureWick upon PTA entry to room.     General Precautions: Standard, fall  Orthopedic Precautions: N/A  Braces: N/A  Respiratory Status: Room air     Functional Mobility:  Transfers:     Sit to Stand:  from bedside chair contact guard assistance with no AD  Gait:   Pt " ambulates x2 trials 70 feet each with contact guard assistance and  no AD/ IV pole . Standing rest break required between trials.   Deviations noted: decreased step length, decreased lizz, decreased gait speed  All lines remained intact and gait belt utilized    AM-PAC 6 CLICK MOBILITY  Turning over in bed (including adjusting bedclothes, sheets and blankets)?: 4  Sitting down on and standing up from a chair with arms (e.g., wheelchair, bedside commode, etc.): 4  Moving from lying on back to sitting on the side of the bed?: 4  Moving to and from a bed to a chair (including a wheelchair)?: 4  Need to walk in hospital room?: 3  Climbing 3-5 steps with a railing?: 4  Basic Mobility Total Score: 23       Treatment & Education:  Patient provided with daily orientation and goals of this PT session.     Pt educated on appropriate BLE therex to perform while seated in bedside chair:  X20 AP, LAQ, hip flexion    Pt educated to call for assistance and to transfer with hospital staff only.  Also, pt was educated on the effects of prolonged immobility and the importance of performing OOB activity and exercises to promote healing and reduce recovery time.    Patient left up in chair with all lines intact, call button in reach, RN notified, and Wife present.    GOALS:   Multidisciplinary Problems       Physical Therapy Goals          Problem: Physical Therapy    Goal Priority Disciplines Outcome Goal Variances Interventions   Physical Therapy Goal     PT, PT/OT Progressing     Description: Goals to be met by: 10/15/24     Patient will increase functional independence with mobility by performin. Supine to sit with [place]  2. Sit to supine with [place]  3. Sit to stand transfer with [place]  4. Bed to chair transfer with [place] using No Assistive Device  5. Gait  x 500 feet with [place] using No Assistive Device.   6. Lower extremity exercise program x15 reps per handout, with assistance as  needed                         Time Tracking:     PT Received On: 09/25/24  PT Start Time: 1422     PT Stop Time: 1436  PT Total Time (min): 14 min     Billable Minutes: Gait Training 14    Treatment Type: Treatment  PT/PTA: PTA     Number of PTA visits since last PT visit: 1 09/25/2024

## 2024-09-25 NOTE — ASSESSMENT & PLAN NOTE
- C-diff neg, so started Imodium PRN  - Of note, was treated empirically for c-diff with oral vanc prior to admission and is receiving oral vanc ppx while admitted.

## 2024-09-25 NOTE — PLAN OF CARE
Day +6  Flu/Rosalee Haplo SCT. PRN  trazodone given. Pt also received PRN tramadol for a pain rated 3/10. Med provided moderate relief. NaCl w/ KCL 20 mEq @ 75 mL/hr. Pt uses urinal and a bedpan.  No difficulty voiding. Pt has had 2 loose, liquid BM. C. Diff stool sample came back negative. VS have been stable throughout shift. Pt AOx4. POC reviewed with patient and wife. Both verbalized an understanding. Questions encouraged and answered. Bed is in the lowest position and locked. Non skid socks worn. Call light within reach. Pt encouraged to call for assistance when needed.

## 2024-09-25 NOTE — PROGRESS NOTES
[facility] Ma - Oncology ([facility])  Hematology  Bone Marrow Transplant  Progress Note    Patient Name: Guillaume Salinas  Admission Date: 9/13/2024  Hospital Length of Stay: 12 days  Code Status: Full Code    Subjective:     Interval History: Day +6 from a  TBI PT Cy haplo (son) allogeneic BMT for MDS. Remains afebrile. VSS. Last fever was > 48 hours ago. Infection w/u remains unremarkable. Stopping Cefepime and resuming LVQ ppx. Having c-diff neg diarrhea. Starting PRN imodium. C/o headache this morning. Daily coffee drinker. Reports that he did not drink coffee yesterday due to diarrhea. Headache improved after coffee this morning, so suspect caffeine withdrawal. Repleting phos. Encouraged to increase activity.    Objective:     Vital Signs (Most Recent):  Temp: 97.5 °F (36.4 °C) (09/25/24 1040)  Pulse: 82 (09/25/24 1126)  Resp: 18 (09/25/24 1040)  BP: 136/77 (09/25/24 1040)  SpO2: 98 % (09/25/24 1040) Vital Signs (24h Range):  Temp:  [97.5 °F (36.4 °C)-98.7 °F (37.1 °C)] 97.5 °F (36.4 °C)  Pulse:  [] 82  Resp:  [17-18] 18  SpO2:  [95 %-98 %] 98 %  BP: (117-145)/(68-83) 136/77     Weight: 76 kg (167 lb 8.8 oz)  Body mass index is 24.74 kg/m².  Body surface area is 1.92 meters squared.    ECOG SCORE           [unfilled]    Intake/Output - Last 3 Shifts         09/23 0700 09/24 0659 09/24 0700 09/25 0659 09/25 0700 09/26 0659    P.O. 846 300     I.V. (mL/kg) 1477.9 (19.4) 1594.6 (21)     Blood 262.1      IV Piggyback 2105 261.6     Total Intake(mL/kg) 4691 (61.7) 2156.2 (28.4)     Urine (mL/kg/hr) 3300 (1.8) 550 (0.3)     Stool 0 0     Total Output 3300 550     Net +1391 +1606.2            Urine Occurrence 420 x 531 x     Stool Occurrence 3 x 5 x 1 x             Physical Exam  Constitutional:       Appearance: He is well-developed.   HENT:      Head: Normocephalic and atraumatic.      Mouth/Throat:      Pharynx: No oropharyngeal exudate.   Eyes:      General:         Right eye: No discharge.          Left eye: No discharge.      Conjunctiva/sclera: Conjunctivae normal.      Pupils: Pupils are equal, round, and reactive to light.   Cardiovascular:      Rate and Rhythm: Normal rate and regular rhythm.      Heart sounds: Normal heart sounds. No murmur heard.  Pulmonary:      Effort: Pulmonary effort is normal. No respiratory distress.      Breath sounds: Normal breath sounds. No wheezing or rales.   Abdominal:      General: Bowel sounds are normal. There is no distension.      Palpations: Abdomen is soft.      Tenderness: There is no abdominal tenderness.   Musculoskeletal:         General: No deformity. Normal range of motion.      Cervical back: Normal range of motion and neck supple.   Skin:     General: Skin is warm and dry.      Findings: No erythema or rash.      Comments: Right chest wall vas cath. Dressing c/d/i. No sign of infection to site.   Neurological:      Mental Status: He is alert and oriented to person, place, and time.   Psychiatric:         Behavior: Behavior normal.         Thought Content: Thought content normal.         Judgment: Judgment normal.            Significant Labs:   CBC:   Recent Labs   Lab 09/24/24  0301 09/25/24  0448   WBC 0.01* 0.02*   HGB 7.8* 8.0*   HCT 22.8* 23.0*   PLT 22* 15*    and CMP:   Recent Labs   Lab 09/24/24  0301 09/25/24  0448   * 137   K 3.9 4.2    109   CO2 21* 24   GLU 94 102   BUN 10 12   CREATININE 0.7 0.7   CALCIUM 8.4* 8.4*   PROT 5.5* 5.6*   ALBUMIN 2.6* 2.6*   BILITOT 0.5 0.6   ALKPHOS 48* 52*   AST 15 14   ALT 14 12   ANIONGAP 6* 4*       Diagnostic Results:  I have reviewed all pertinent imaging results/findings within the past 24 hours.  Assessment/Plan:     * Stem cell transplant candidate  - Patient of Dr. 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 Today is Day +6.  - Patient is B+. Donor is A+.  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 Patient will start letermovir ppx on  9/24/24.  - Received fresh product on 9/19/24 with a CD34 dose of 3.55 x10^6.  - Of note, cilostazol may interact with tacro. (Currently on hold for plts < 50K).  - See treatment plan below:    Planned conditioning regimen:  Fludarabine on Day -5, -4, -3, and -2  Melphalan on Day -2  TBI on Day -1     Planned  GVHD Prophylaxis:  Cyclophosphamide (with [place]na) on Days +3 and +4  Mycophenolate starting on Day +5  Tacrolimus starting on Day +5     Antimicrobial Prophylaxis:  Acyclovir starting on Day -5  Levofloxacin starting on Day -1  Micafungin on Day -1 through Day +4  Letermovir starting on Day +5 (if CMV PCR drawn on day 0 results negative)  Posaconazole starting on Day +5  Bactrim starting on Day +30     SOS/VOD Prophylaxis:  Ursodiol on Day -5 through Day +30      Growth Factor Support:  Neupogen starting on Day +5     Caregiver: wife   Post-transplant discharge plans: home        Myelodysplastic syndrome  From Dr Hickey's clinic note 8/5:  Stem cell transplant candidate: We discussed the role for allogeneic stem cell transplantation in this disease process as a potentially curative option. We had an extensive discussion about the rationale, logistics, risks, and benefits. We reviewed the requirement to stay in the [place] area for 100 days with a caregiver at all times. We discussed the risks, including infection, graft failure, organ toxicity, graft versus host disease, relapse of disease, and secondary cancers. We reviewed the need for long-term immunosuppression and need for close monitoring. HCT-CI of 1 (intermediate risk). We had a prolonged discussion today regarding his recent deconditioning, Cdiff, and underlying disease. He is now much improved since last seen, and is improving his strength since starting to work with PT. Diarrhea now controlled. We discussed that our plan to move forward with the transplant process.   Coordinator: Fay Stephens  Regimen:  + 2Gy TBI  Donor: son (haplo)    Graft source: BM  - Admitted 9/13/24 for a  TBI PT Cy haplo (son) allogeneic BMT    Pancytopenia  - Expected to worsen following chemotherapy  - Daily CBC while inpatient  - Transfuse for hgb <7 Plt  or <10K  - Continue antimicrobial ppx  - Holding cilostazol for plts < 50K    Diarrhea  - C-diff neg, so started Imodium PRN  - Of note, was treated empirically for c-diff with oral vanc prior to admission and is receiving oral vanc ppx while admitted.    Neutropenic fever  - Afebrile over night. Last fever was > 24 hours ago.  - Infection w/u unremarkable thus far.  - No fevers for > 48 hours, so stopping Cefepime and resuming oral LVQ.    Coronary artery disease involving native coronary artery of native heart without angina pectoris  - Holding home Coreg for hypotension. Can resume as indicated.    Headache  - C/o headache this morning.   - Daily coffee drinker. Improved after drinking coffee.  - Suspect caffeine withdrawal.  - Can start caffeine tablet if patient is unable to drink coffee due to chemo side effects.    Insomnia  - Continue home PRN Trazodone    History of Clostridioides difficile infection  - Was treated empirically for c-diff with oral vanc prior to admission.  - Continue oral vanc ppx per transplant protocol      Neuropathy  - Continue home gabapentin    Hypertension, essential  - Holding home Coreg for fluid responsive hypotension. Resume as indicated.        VTE Risk Mitigation (From admission, onward)           Ordered     heparin, porcine (PF) 100 unit/mL injection flush 300 Units  As needed (PRN)         09/13/24 3179                    Disposition: Inpatient for allogeneic BMT. Awaiting neutrophil engraftment.    Luz Napoles, NP  Bone Marrow Transplant  [facility] Ma - Oncology ([facility])

## 2024-09-25 NOTE — ASSESSMENT & PLAN NOTE
From Dr Hickey's clinic note 8/5:  Stem cell transplant candidate: We discussed the role for allogeneic stem cell transplantation in this disease process as a potentially curative option. We had an extensive discussion about the rationale, logistics, risks, and benefits. We reviewed the requirement to stay in the [place] area for 100 days with a caregiver at all times. We discussed the risks, including infection, graft failure, organ toxicity, graft versus host disease, relapse of disease, and secondary cancers. We reviewed the need for long-term immunosuppression and need for close monitoring. HCT-CI of 1 (intermediate risk). We had a prolonged discussion today regarding his recent deconditioning, Cdiff, and underlying disease. He is now much improved since last seen, and is improving his strength since starting to work with PT. Diarrhea now controlled. We discussed that our plan to move forward with the transplant process.   Coordinator: Fay Stephens  Regimen:  + 2Gy TBI  Donor: son (haplo)   Graft source: BM  - Admitted 9/13/24 for a  TBI PT Cy haplo (son) allogeneic BMT

## 2024-09-25 NOTE — ASSESSMENT & PLAN NOTE
- Was treated empirically for c-diff with oral vanc prior to admission.  - Continue oral vanc ppx per transplant protocol

## 2024-09-25 NOTE — PLAN OF CARE
Problem: Adult Inpatient Plan of Care  Goal: Plan of Care Review  Outcome: Progressing  Goal: Patient-Specific Goal (Individualized)  Outcome: Progressing  Goal: Absence of Hospital-Acquired Illness or Injury  Outcome: Progressing  Goal: Optimal Comfort and Wellbeing  Outcome: Progressing  Goal: Readiness for Transition of Care  Outcome: Progressing     Problem: Infection  Goal: Absence of Infection Signs and Symptoms  Outcome: Progressing     Problem: Wound  Goal: Absence of Infection Signs and Symptoms  Outcome: Progressing  Goal: Optimal Pain Control and Function  Outcome: Progressing  Goal: Skin Health and Integrity  Outcome: Progressing  Goal: Optimal Wound Healing  Outcome: Progressing     Problem: Stem Cell/Bone Marrow Transplant  Goal: Optimal Coping with Transplant  Outcome: Progressing  Goal: Symptom-Free Urinary Elimination  Outcome: Progressing  Goal: Diarrhea Symptom Control  Outcome: Progressing  Goal: Improved Activity Tolerance  Outcome: Progressing  Goal: Blood Counts Within Acceptable Range  Outcome: Progressing  Goal: Absence of Hypersensitivity Reaction  Outcome: Progressing  Goal: Absence of Infection  Outcome: Progressing  Goal: Improved Oral Mucous Membrane Health and Integrity  Outcome: Progressing  Goal: Nausea and Vomiting Symptom Relief  Outcome: Progressing  Goal: Optimal Nutrition Intake  Outcome: Progressing     Problem: Fall Injury Risk  Goal: Absence of Fall and Fall-Related Injury  Outcome: Progressing     Problem: Skin Injury Risk Increased  Goal: Skin Health and Integrity  Outcome: Progressing

## 2024-09-25 NOTE — PLAN OF CARE
Assumed care of pt. 06:45-19:15  Pt involved in plan of care and communicating needs throughout shift.      - D +6 Haplo Allo SCT for MDS  - AAOx4; Frisian speaking  - c/o HA relieved by PRN tramadol/coffee  - Ambulates SBA; Up in chair part of day today  - Reg. Diet; RA  - Remains afebrile  - Voids via RR; BM x2 this shift  - NSR on telemonitor + cont. Pulse ox  - Skin intact; Hemorrhoids noted   - Electrolytes replaced  - R IJ vaughn sterile dsg change w/NA + 20K @ 75     - q1h patient rounds. All VSS; no acute events so far this shift. Non skid socks on; Pt. Instructed to call if any assistance is needed. Pt remaining free from falls or injury; Bed locked in lowest position with bed alarm on and audible; side rails up x3 & all belongings including call light within reach; Instructed to call prior to getting OOB ; Plan of care ongoing; All needs met at this time.

## 2024-09-25 NOTE — ASSESSMENT & PLAN NOTE
- Patient of Dr. 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 Today is Day +6.  - Patient is B+. Donor is A+.  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 Patient will start letermovir ppx on 9/24/24.  - Received fresh product on 9/19/24 with a CD34 dose of 3.55 x10^6.  - Of note, cilostazol may interact with tacro. (Currently on hold for plts < 50K).  - See treatment plan below:    Planned conditioning regimen:  Fludarabine on Day -5, -4, -3, and -2  Melphalan on Day -2  TBI on Day -1     Planned  GVHD Prophylaxis:  Cyclophosphamide (with [place]na) on Days +3 and +4  Mycophenolate starting on Day +5  Tacrolimus starting on Day +5     Antimicrobial Prophylaxis:  Acyclovir starting on Day -5  Levofloxacin starting on Day -1  Micafungin on Day -1 through Day +4  Letermovir starting on Day +5 (if CMV PCR drawn on day 0 results negative)  Posaconazole starting on Day +5  Bactrim starting on Day +30     SOS/VOD Prophylaxis:  Ursodiol on Day -5 through Day +30      Growth Factor Support:  Neupogen starting on Day +5     Caregiver: wife   Post-transplant discharge plans: home

## 2024-09-25 NOTE — PROGRESS NOTES
Met with patient for psychosocial check-in. Serbian , Gwendolyn ID#[id number], was present via telephone for interpretive services with patient's consent. Patient confirmed his identity via two identifiers and agreed to consent for me to meet with him today. Patient's wife was present with patient's consent. Patient noted he feels better today after not feeling well physically yesterday. He stated he plans on sleeping to recover and also plans on engaging with supports and walking in future during current hospital stay. Patient denied any nervousness or anxiety. Patient denied any SI/HI and appeared to be in positive mood during meeting today. Patient agreed for me to attempt to meet with him next week to assess his coping at that time.      Giles Magana Psy.D.  Clinical Psychologist  [place] License #[id number]  [place] License #[id number]

## 2024-09-25 NOTE — ASSESSMENT & PLAN NOTE
- Afebrile over night. Last fever was > 24 hours ago.  - Infection w/u unremarkable thus far.  - No fevers for > 48 hours, so stopping Cefepime and resuming oral LVQ.

## 2024-09-26 PROBLEM — K64.9 HEMORRHOIDS: Status: ACTIVE | Noted: 2024-09-26

## 2024-09-26 PROBLEM — K52.1 CHEMOTHERAPY INDUCED DIARRHEA: Status: ACTIVE | Noted: 2024-09-24

## 2024-09-26 PROBLEM — T45.1X5A CHEMOTHERAPY INDUCED DIARRHEA: Status: ACTIVE | Noted: 2024-09-24

## 2024-09-26 PROBLEM — D61.810 PANCYTOPENIA DUE TO ANTINEOPLASTIC CHEMOTHERAPY: Status: ACTIVE | Noted: 2023-06-29

## 2024-09-26 PROBLEM — Z94.81 S/P ALLOGENEIC BONE MARROW TRANSPLANT: Status: ACTIVE | Noted: 2024-08-27

## 2024-09-26 PROBLEM — T45.1X5A PANCYTOPENIA DUE TO ANTINEOPLASTIC CHEMOTHERAPY: Status: ACTIVE | Noted: 2023-06-29

## 2024-09-26 LAB
ABO + RH BLD: NORMAL
ALBUMIN SERPL BCP-MCNC: 2.5 G/DL (ref 3.5–5.2)
ALP SERPL-CCNC: 50 U/L (ref 55–135)
ALT SERPL W/O P-5'-P-CCNC: 9 U/L (ref 10–44)
ANION GAP SERPL CALC-SCNC: 5 MMOL/L (ref 8–16)
ANISOCYTOSIS BLD QL SMEAR: SLIGHT
AST SERPL-CCNC: 14 U/L (ref 10–40)
BASOPHILS # BLD AUTO: 0 K/UL (ref 0–0.2)
BASOPHILS NFR BLD: 0 % (ref 0–1.9)
BILIRUB SERPL-MCNC: 0.5 MG/DL (ref 0.1–1)
BLD GP AB SCN CELLS X3 SERPL QL: NORMAL
BLD PROD TYP BPU: NORMAL
BLOOD UNIT EXPIRATION DATE: NORMAL
BLOOD UNIT TYPE CODE: 8400
BLOOD UNIT TYPE: NORMAL
BUN SERPL-MCNC: 12 MG/DL (ref 8–23)
CALCIUM SERPL-MCNC: 7.8 MG/DL (ref 8.7–10.5)
CHLORIDE SERPL-SCNC: 114 MMOL/L (ref 95–110)
CO2 SERPL-SCNC: 23 MMOL/L (ref 23–29)
CODING SYSTEM: NORMAL
CREAT SERPL-MCNC: 0.6 MG/DL (ref 0.5–1.4)
CROSSMATCH INTERPRETATION: NORMAL
DIFFERENTIAL METHOD BLD: ABNORMAL
DISPENSE STATUS: NORMAL
EOSINOPHIL # BLD AUTO: 0 K/UL (ref 0–0.5)
EOSINOPHIL NFR BLD: 0 % (ref 0–8)
ERYTHROCYTE [DISTWIDTH] IN BLOOD BY AUTOMATED COUNT: 13.2 % (ref 11.5–14.5)
EST. GFR  (NO RACE VARIABLE): >60 ML/MIN/1.73 M^2
GLUCOSE SERPL-MCNC: 94 MG/DL (ref 70–110)
HCT VFR BLD AUTO: 21.4 % (ref 40–54)
HGB BLD-MCNC: 7.4 G/DL (ref 14–18)
HYPOCHROMIA BLD QL SMEAR: ABNORMAL
IMM GRANULOCYTES # BLD AUTO: 0 K/UL (ref 0–0.04)
IMM GRANULOCYTES NFR BLD AUTO: 0 % (ref 0–0.5)
LYMPHOCYTES # BLD AUTO: 0 K/UL (ref 1–4.8)
LYMPHOCYTES NFR BLD: 0 % (ref 18–48)
MAGNESIUM SERPL-MCNC: 1.8 MG/DL (ref 1.6–2.6)
MCH RBC QN AUTO: 30.8 PG (ref 27–31)
MCHC RBC AUTO-ENTMCNC: 34.6 G/DL (ref 32–36)
MCV RBC AUTO: 89 FL (ref 82–98)
MONOCYTES # BLD AUTO: 0 K/UL (ref 0.3–1)
MONOCYTES NFR BLD: 0 % (ref 4–15)
NEUTROPHILS # BLD AUTO: 0 K/UL (ref 1.8–7.7)
NEUTROPHILS NFR BLD: 100 % (ref 38–73)
NRBC BLD-RTO: 0 /100 WBC
OVALOCYTES BLD QL SMEAR: ABNORMAL
PHOSPHATE SERPL-MCNC: 2.7 MG/DL (ref 2.7–4.5)
PLATELET # BLD AUTO: 8 K/UL (ref 150–450)
PLATELET BLD QL SMEAR: ABNORMAL
PMV BLD AUTO: 12.1 FL (ref 9.2–12.9)
POIKILOCYTOSIS BLD QL SMEAR: SLIGHT
POLYCHROMASIA BLD QL SMEAR: ABNORMAL
POTASSIUM SERPL-SCNC: 3.9 MMOL/L (ref 3.5–5.1)
PROT SERPL-MCNC: 5.3 G/DL (ref 6–8.4)
RBC # BLD AUTO: 2.4 M/UL (ref 4.6–6.2)
SODIUM SERPL-SCNC: 142 MMOL/L (ref 136–145)
SPECIMEN OUTDATE: NORMAL
UNIT NUMBER: NORMAL
WBC # BLD AUTO: 0.01 K/UL (ref 3.9–12.7)

## 2024-09-26 PROCEDURE — 85025 COMPLETE CBC W/AUTO DIFF WBC: CPT | Performed by: NURSE PRACTITIONER

## 2024-09-26 PROCEDURE — 83735 ASSAY OF MAGNESIUM: CPT | Performed by: NURSE PRACTITIONER

## 2024-09-26 PROCEDURE — 25000003 PHARM REV CODE 250: Performed by: INTERNAL MEDICINE

## 2024-09-26 PROCEDURE — 36430 TRANSFUSION BLD/BLD COMPNT: CPT

## 2024-09-26 PROCEDURE — 25000003 PHARM REV CODE 250: Performed by: NURSE PRACTITIONER

## 2024-09-26 PROCEDURE — 20600001 HC STEP DOWN PRIVATE ROOM

## 2024-09-26 PROCEDURE — 25000003 PHARM REV CODE 250

## 2024-09-26 PROCEDURE — 84100 ASSAY OF PHOSPHORUS: CPT | Performed by: NURSE PRACTITIONER

## 2024-09-26 PROCEDURE — 86850 RBC ANTIBODY SCREEN: CPT | Performed by: INTERNAL MEDICINE

## 2024-09-26 PROCEDURE — 63600175 PHARM REV CODE 636 W HCPCS: Performed by: INTERNAL MEDICINE

## 2024-09-26 PROCEDURE — 86900 BLOOD TYPING SEROLOGIC ABO: CPT | Performed by: INTERNAL MEDICINE

## 2024-09-26 PROCEDURE — P9037 PLATE PHERES LEUKOREDU IRRAD: HCPCS | Performed by: NURSE PRACTITIONER

## 2024-09-26 PROCEDURE — 86901 BLOOD TYPING SEROLOGIC RH(D): CPT | Performed by: INTERNAL MEDICINE

## 2024-09-26 PROCEDURE — 80053 COMPREHEN METABOLIC PANEL: CPT | Performed by: NURSE PRACTITIONER

## 2024-09-26 RX ORDER — HYDROCORTISONE 25 MG/G
CREAM TOPICAL 2 TIMES DAILY
Status: DISCONTINUED | OUTPATIENT
Start: 2024-09-26 | End: 2024-10-16 | Stop reason: HOSPADM

## 2024-09-26 RX ORDER — LOPERAMIDE HYDROCHLORIDE 2 MG/1
2 CAPSULE ORAL 4 TIMES DAILY
Status: DISCONTINUED | OUTPATIENT
Start: 2024-09-26 | End: 2024-10-03

## 2024-09-26 RX ORDER — DIPHENOXYLATE HYDROCHLORIDE AND ATROPINE SULFATE 2.5; .025 MG/1; MG/1
1 TABLET ORAL 4 TIMES DAILY PRN
Status: DISCONTINUED | OUTPATIENT
Start: 2024-09-26 | End: 2024-10-16 | Stop reason: HOSPADM

## 2024-09-26 RX ADMIN — Medication: at 09:09

## 2024-09-26 RX ADMIN — VANCOMYCIN HYDROCHLORIDE 125 MG: KIT at 08:09

## 2024-09-26 RX ADMIN — VANCOMYCIN HYDROCHLORIDE 125 MG: KIT at 09:09

## 2024-09-26 RX ADMIN — MYCOPHENOLATE MOFETIL 1000 MG: 250 CAPSULE ORAL at 09:09

## 2024-09-26 RX ADMIN — TRAMADOL HYDROCHLORIDE 50 MG: 50 TABLET, COATED ORAL at 09:09

## 2024-09-26 RX ADMIN — Medication 1 DOSE: at 09:09

## 2024-09-26 RX ADMIN — MYCOPHENOLATE MOFETIL 1000 MG: 250 CAPSULE ORAL at 03:09

## 2024-09-26 RX ADMIN — URSODIOL 300 MG: 300 CAPSULE ORAL at 08:09

## 2024-09-26 RX ADMIN — SODIUM CHLORIDE AND POTASSIUM CHLORIDE 75 ML/HR: .9; .15 SOLUTION INTRAVENOUS at 09:09

## 2024-09-26 RX ADMIN — DIPHENHYDRAMINE HYDROCHLORIDE 50 MG: 25 CAPSULE ORAL at 09:09

## 2024-09-26 RX ADMIN — PROCHLORPERAZINE EDISYLATE 10 MG: 5 INJECTION INTRAMUSCULAR; INTRAVENOUS at 07:09

## 2024-09-26 RX ADMIN — LOPERAMIDE HYDROCHLORIDE 2 MG: 2 CAPSULE ORAL at 06:09

## 2024-09-26 RX ADMIN — DIPHENOXYLATE HYDROCHLORIDE AND ATROPINE SULFATE 1 TABLET: .025; 2.5 TABLET ORAL at 12:09

## 2024-09-26 RX ADMIN — GABAPENTIN 600 MG: 300 CAPSULE ORAL at 08:09

## 2024-09-26 RX ADMIN — LOPERAMIDE HYDROCHLORIDE 2 MG: 2 CAPSULE ORAL at 12:09

## 2024-09-26 RX ADMIN — URSODIOL 300 MG: 300 CAPSULE ORAL at 09:09

## 2024-09-26 RX ADMIN — GABAPENTIN 600 MG: 300 CAPSULE ORAL at 09:09

## 2024-09-26 RX ADMIN — TACROLIMUS 1 MG: 1 CAPSULE ORAL at 05:09

## 2024-09-26 RX ADMIN — Medication 400 MG: at 12:09

## 2024-09-26 RX ADMIN — POSACONAZOLE 300 MG: 100 TABLET, DELAYED RELEASE ORAL at 09:09

## 2024-09-26 RX ADMIN — Medication 1 DOSE: at 05:09

## 2024-09-26 RX ADMIN — Medication 1 DOSE: at 08:09

## 2024-09-26 RX ADMIN — LETERMOVIR 480 MG: 480 TABLET, FILM COATED ORAL at 09:09

## 2024-09-26 RX ADMIN — Medication 400 MG: at 09:09

## 2024-09-26 RX ADMIN — LOPERAMIDE HYDROCHLORIDE 2 MG: 2 CAPSULE ORAL at 08:09

## 2024-09-26 RX ADMIN — TACROLIMUS 1 MG: 1 CAPSULE ORAL at 09:09

## 2024-09-26 RX ADMIN — Medication: at 08:09

## 2024-09-26 RX ADMIN — PANTOPRAZOLE SODIUM 40 MG: 40 TABLET, DELAYED RELEASE ORAL at 09:09

## 2024-09-26 RX ADMIN — TRAZODONE HYDROCHLORIDE 50 MG: 50 TABLET ORAL at 08:09

## 2024-09-26 RX ADMIN — ACYCLOVIR 800 MG: 200 CAPSULE ORAL at 09:09

## 2024-09-26 RX ADMIN — FILGRASTIM 300 MCG: 300 INJECTION, SOLUTION INTRAVENOUS; SUBCUTANEOUS at 09:09

## 2024-09-26 RX ADMIN — SODIUM CHLORIDE AND POTASSIUM CHLORIDE 75 ML/HR: .9; .15 SOLUTION INTRAVENOUS at 10:09

## 2024-09-26 RX ADMIN — LEVOFLOXACIN 500 MG: 500 TABLET, FILM COATED ORAL at 09:09

## 2024-09-26 RX ADMIN — LOPERAMIDE HYDROCHLORIDE 2 MG: 2 CAPSULE ORAL at 05:09

## 2024-09-26 RX ADMIN — ACYCLOVIR 800 MG: 200 CAPSULE ORAL at 08:09

## 2024-09-26 RX ADMIN — Medication 1 DOSE: at 12:09

## 2024-09-26 RX ADMIN — MYCOPHENOLATE MOFETIL 1000 MG: 250 CAPSULE ORAL at 08:09

## 2024-09-26 RX ADMIN — HYDROCORTISONE: 25 CREAM TOPICAL at 09:09

## 2024-09-26 RX ADMIN — HYDROCORTISONE: 25 CREAM TOPICAL at 10:09

## 2024-09-26 NOTE — ASSESSMENT & PLAN NOTE
- Patient of Dr. 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 Donor is A+.  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 Patient will start letermovir ppx on 9/24/24.  - Received fresh product on 9/19/24 with a CD34 dose of 3.55 x10^6.  - Of note, cilostazol may interact with tacro. (Currently on hold for plts < 50K).  - See treatment plan below:    Planned conditioning regimen:  Fludarabine on Day -5, -4, -3, and -2  Melphalan on Day -2  TBI on Day -1     Planned  GVHD Prophylaxis:  Cyclophosphamide (with Mesna) on Days +3 and +4  Mycophenolate starting on Day +5  Tacrolimus starting on Day +5     Antimicrobial Prophylaxis:  Acyclovir starting on Day -5  Levofloxacin starting on Day -1  Micafungin on Day -1 through Day +4  Letermovir starting on Day +5 (if CMV PCR drawn on day 0 results negative)  Posaconazole starting on Day +5  Bactrim starting on Day +30     SOS/VOD Prophylaxis:  Ursodiol on Day -5 through Day +30      Growth Factor Support:  Neupogen starting on Day +5     Caregiver: wife   Post-transplant discharge plans: home

## 2024-09-26 NOTE — ASSESSMENT & PLAN NOTE
- C-diff neg 9/24  - Of note, was treated empirically for c-diff with oral vanc prior to admission and is receiving oral vanc ppx while admitted.  - persists so imodium now scheduled ATC and added prn lomotil

## 2024-09-26 NOTE — ASSESSMENT & PLAN NOTE
- d/t chemo induced diarrhea  - has anusol, tucks pads, and LETS gel  - patient denies any bleeding at site

## 2024-09-26 NOTE — ASSESSMENT & PLAN NOTE
From Dr Hickey's clinic note 8/5:  Stem cell transplant candidate: We discussed the role for allogeneic stem cell transplantation in this disease process as a potentially curative option. We had an extensive discussion about the rationale, logistics, risks, and benefits. We reviewed the requirement to stay in the [place] area for 100 days with a caregiver at all times. We discussed the risks, including infection, graft failure, organ toxicity, graft versus host disease, relapse of disease, and secondary cancers. We reviewed the need for long-term immunosuppression and need for close monitoring. HCT-CI of 1 (intermediate risk). We had a prolonged discussion today regarding his recent deconditioning, Cdiff, and underlying disease. He is now much improved since last seen, and is improving his strength since starting to work with PT. Diarrhea now controlled. We discussed that our plan to move forward with the transplant process.   Coordinator: Fay Stephens  Regimen:  + 2Gy TBI  Donor: son (haplo)   Graft source: BM  - Admitted 9/13/24 for a  TBI PT Cy haplo (son) allogeneic BMT

## 2024-09-26 NOTE — PLAN OF CARE
Day +7  Flu/Rosalee Haplo SCT. No acute events occurred during this shift.  NaCl w/ KCL 20 mEq @ 75 mL/hr. Pt uses the  urinal at bedside. No difficulty voiding. Pt has had 1 loose, liquid BM. VS have been stable throughout shift. Pt AOx4. POC reviewed with patient and wife. Both verbalized an understanding. Questions encouraged and answered. Bed is in the lowest position and locked. Non skid socks worn. Call light within reach. Pt encouraged to call for assistance when needed.

## 2024-09-26 NOTE — ASSESSMENT & PLAN NOTE
- Daily CBC while inpatient  - Transfuse for hgb <7 Plt  or <10K  - Continue antimicrobial ppx  - Holding cilostazol for plts < 50K

## 2024-09-26 NOTE — ASSESSMENT & PLAN NOTE
- First fever with tmax 101.8F on 9/23  - Infection w/u unremarkable thus far  - No fevers since 9/23, Cefepime stopped on 9/25 and back on oral ppx LVQ.  RESOLVED

## 2024-09-26 NOTE — PROGRESS NOTES
Janusz Ma - Oncology ([facility])  Hematology  Bone Marrow Transplant  Progress Note    Patient Name: Guillaume Salinas  Admission Date: 9/13/2024  Hospital Length of Stay: 13 days  Code Status: Full Code    Subjective:     Interval History: Day +7 s/p  + TBI + PT Cy Haploidentical (son) BMT for MDS. Will have first tacro level tomorrow. Currently on 1mg BID. Afebrile, back on ppx levaquin as of yesterday. Blood cultures from 9/23 still NGTD. Denies n/v. Still with diarrhea. Scheduling imodium and adding prn lomotil. Added anusol and tucks pads for hemorrhoids. Will receive 1unit platelets today. VSS    Objective:     Vital Signs (Most Recent):  Temp: 98 °F (36.7 °C) (09/26/24 1034)  Pulse: 91 (09/26/24 1117)  Resp: 18 (09/26/24 1034)  BP: 118/70 (09/26/24 1034)  SpO2: 96 % (09/26/24 1034) Vital Signs (24h Range):  Temp:  [97.7 °F (36.5 °C)-98.5 °F (36.9 °C)] 98 °F (36.7 °C)  Pulse:  [86-96] 91  Resp:  [16-18] 18  SpO2:  [94 %-98 %] 96 %  BP: (109-127)/(64-78) 118/70     Weight: 73.9 kg (163 lb 0.5 oz)  Body mass index is 24.08 kg/m².  Body surface area is 1.9 meters squared.      Intake/Output - Last 3 Shifts         09/24 0700  09/25 0659 09/25 0700 09/26 0659 09/26 0700  09/27 0659    P.O. 300 400     I.V. (mL/kg) 1594.6 (21) 1778.6 (24.1)     Blood       IV Piggyback 261.6 184.7     Total Intake(mL/kg) 2156.2 (28.4) 2363.3 (32)     Urine (mL/kg/hr) 550 (0.3) 1530 (0.9)     Stool 0 0     Total Output 550 1530     Net +1606.2 +833.3            Urine Occurrence 531 x 5 x     Stool Occurrence 5 x 3 x 3 x             Physical Exam  Vitals and nursing note reviewed.   Constitutional:       Appearance: Normal appearance. He is well-developed.   HENT:      Head: Normocephalic and atraumatic.      Nose: Nose normal.      Mouth/Throat:      Pharynx: No oropharyngeal exudate or posterior oropharyngeal erythema.   Eyes:      General:         Right eye: No discharge.         Left eye: No discharge.       Extraocular Movements: Extraocular movements intact.      Conjunctiva/sclera: Conjunctivae normal.   Cardiovascular:      Rate and Rhythm: Normal rate and regular rhythm.      Heart sounds: Normal heart sounds. No murmur heard.  Pulmonary:      Effort: Pulmonary effort is normal. No respiratory distress.      Breath sounds: Normal breath sounds. No wheezing or rales.   Abdominal:      General: Bowel sounds are normal. There is no distension.      Palpations: Abdomen is soft.      Tenderness: There is no abdominal tenderness.   Musculoskeletal:         General: No deformity. Normal range of motion.      Cervical back: Normal range of motion and neck supple.      Right lower leg: No edema.      Left lower leg: No edema.   Skin:     General: Skin is warm and dry.      Findings: No erythema or rash.      Comments: Right chest wall vas cath. Dressing c/d/i. No sign of infection to site.   Neurological:      General: No focal deficit present.      Mental Status: He is alert and oriented to person, place, and time.      Motor: No weakness.   Psychiatric:         Mood and Affect: Mood normal.         Behavior: Behavior normal.         Thought Content: Thought content normal.         Judgment: Judgment normal.            Significant Labs:   CBC:   Recent Labs   Lab 09/25/24  0448 09/26/24  0403   WBC 0.02* 0.01*   HGB 8.0* 7.4*   HCT 23.0* 21.4*   PLT 15* 8*    and CMP:   Recent Labs   Lab 09/25/24  0448 09/26/24  0403    142   K 4.2 3.9    114*   CO2 24 23    94   BUN 12 12   CREATININE 0.7 0.6   CALCIUM 8.4* 7.8*   PROT 5.6* 5.3*   ALBUMIN 2.6* 2.5*   BILITOT 0.6 0.5   ALKPHOS 52* 50*   AST 14 14   ALT 12 9*   ANIONGAP 4* 5*       Diagnostic Results:  I have reviewed all pertinent imaging results/findings within the past 24 hours.  Assessment/Plan:     * S/P allogeneic bone marrow transplant  - Patient of Dr. 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 Donor is A+.  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 Patient will start letermovir ppx on 9/24/24.  - Received fresh product on 9/19/24 with a CD34 dose of 3.55 x10^6.  - Of note, cilostazol may interact with tacro. (Currently on hold for plts < 50K).  - See treatment plan below:    Planned conditioning regimen:  Fludarabine on Day -5, -4, -3, and -2  Melphalan on Day -2  TBI on Day -1     Planned  GVHD Prophylaxis:  Cyclophosphamide (with [place]) on Days +3 and +4  Mycophenolate starting on Day +5  Tacrolimus starting on Day +5     Antimicrobial Prophylaxis:  Acyclovir starting on Day -5  Levofloxacin starting on Day -1  Micafungin on Day -1 through Day +4  Letermovir starting on Day +5 (if CMV PCR drawn on day 0 results negative)  Posaconazole starting on Day +5  Bactrim starting on Day +30     SOS/VOD Prophylaxis:  Ursodiol on Day -5 through Day +30      Growth Factor Support:  Neupogen starting on Day +5     Caregiver: wife   Post-transplant discharge plans: home        Myelodysplastic syndrome  From Dr Hickey's clinic note 8/5:  Stem cell transplant candidate: We discussed the role for allogeneic stem cell transplantation in this disease process as a potentially curative option. We had an extensive discussion about the rationale, logistics, risks, and benefits. We reviewed the requirement to stay in the [place] area for 100 days with a caregiver at all times. We discussed the risks, including infection, graft failure, organ toxicity, graft versus host disease, relapse of disease, and secondary cancers. We reviewed the need for long-term immunosuppression and need for close monitoring. HCT-CI of 1 (intermediate risk). We had a prolonged discussion today regarding his recent deconditioning, Cdiff, and underlying disease. He is now much  improved since last seen, and is improving his strength since starting to work with PT. Diarrhea now controlled. We discussed that our plan to move forward with the transplant process.   Coordinator: Fay Stephens  Regimen:  + 2Gy TBI  Donor: son (haplo)   Graft source: BM  - Admitted 9/13/24 for a  TBI PT Cy haplo (son) allogeneic BMT    Pancytopenia due to antineoplastic chemotherapy  - Daily CBC while inpatient  - Transfuse for hgb <7 Plt  or <10K  - Continue antimicrobial ppx  - Holding cilostazol for plts < 50K    Chemotherapy induced diarrhea  - C-diff neg 9/24  - Of note, was treated empirically for c-diff with oral vanc prior to admission and is receiving oral vanc ppx while admitted.  - persists so imodium now scheduled ATC and added prn lomotil    Neutropenic fever  - First fever with tmax 101.8F on 9/23  - Infection w/u unremarkable thus far  - No fevers since 9/23, Cefepime stopped on 9/25 and back on oral ppx LVQ.  RESOLVED    Hemorrhoids  - d/t chemo induced diarrhea  - has anusol, tucks pads, and LETS gel  - patient denies any bleeding at site    Headache  - C/o headache 9/25  - Daily coffee drinker. Improved after drinking coffee.  - Suspect caffeine withdrawal.  - Can start caffeine tablet if patient is unable to drink coffee due to chemo side effects  Reports no HA today    Insomnia  - Continue home PRN Trazodone    History of Clostridioides difficile infection  - Was treated empirically for c-diff with oral vanc prior to admission.  - Continue oral vanc ppx per transplant protocol      Neuropathy  - Continue home gabapentin    Hypertension, essential  - Holding home Coreg for fluid responsive hypotension. Resume as indicated.    Coronary artery disease involving native coronary artery of native heart without angina pectoris  - Holding home Coreg for hypotension. Can resume as indicated.        VTE Risk Mitigation (From admission, onward)           Ordered     heparin, porcine (PF) 100  unit/mL injection flush 300 Units  As needed (PRN)         09/13/24 2610                    Disposition: Remains inpatient    Judy Anthony NP  Bone Marrow Transplant  Janusz freddy - Oncology ([facility])

## 2024-09-26 NOTE — ASSESSMENT & PLAN NOTE
- C/o headache 9/25  - Daily coffee drinker. Improved after drinking coffee.  - Suspect caffeine withdrawal.  - Can start caffeine tablet if patient is unable to drink coffee due to chemo side effects  Reports no HA today

## 2024-09-26 NOTE — SUBJECTIVE & OBJECTIVE
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 Will have first tacro level tomorrow. Currently on 1mg BID. Afebrile, back on ppx levaquin as of yesterday. Blood cultures from 9/23 still NGTD. Denies n/v. Still with diarrhea. Scheduling imodium and adding prn lomotil. Added anusol and tucks pads for hemorrhoids. Will receive 1unit platelets today. VSS    Objective:     Vital Signs (Most Recent):  Temp: 98 °F (36.7 °C) (09/26/24 1034)  Pulse: 91 (09/26/24 1117)  Resp: 18 (09/26/24 1034)  BP: 118/70 (09/26/24 1034)  SpO2: 96 % (09/26/24 1034) Vital Signs (24h Range):  Temp:  [97.7 °F (36.5 °C)-98.5 °F (36.9 °C)] 98 °F (36.7 °C)  Pulse:  [86-96] 91  Resp:  [16-18] 18  SpO2:  [94 %-98 %] 96 %  BP: (109-127)/(64-78) 118/70     Weight: 73.9 kg (163 lb 0.5 oz)  Body mass index is 24.08 kg/m².  Body surface area is 1.9 meters squared.      Intake/Output - Last 3 Shifts         09/24 0700  09/25 0659 09/25 0700 09/26 0659 09/26 0700  09/27 0659    P.O. 300 400     I.V. (mL/kg) 1594.6 (21) 1778.6 (24.1)     Blood       IV Piggyback 261.6 184.7     Total Intake(mL/kg) 2156.2 (28.4) 2363.3 (32)     Urine (mL/kg/hr) 550 (0.3) 1530 (0.9)     Stool 0 0     Total Output 550 1530     Net +1606.2 +833.3            Urine Occurrence 531 x 5 x     Stool Occurrence 5 x 3 x 3 x             Physical Exam  Vitals and nursing note reviewed.   Constitutional:       Appearance: Normal appearance. He is well-developed.   HENT:      Head: Normocephalic and atraumatic.      Nose: Nose normal.      Mouth/Throat:      Pharynx: No oropharyngeal exudate or posterior oropharyngeal erythema.   Eyes:      General:         Right eye: No discharge.         Left eye: No discharge.      Extraocular Movements: Extraocular movements intact.      Conjunctiva/sclera: Conjunctivae normal.   Cardiovascular:      Rate and Rhythm: Normal rate and regular rhythm.      Heart sounds: Normal heart sounds. No murmur  heard.  Pulmonary:      Effort: Pulmonary effort is normal. No respiratory distress.      Breath sounds: Normal breath sounds. No wheezing or rales.   Abdominal:      General: Bowel sounds are normal. There is no distension.      Palpations: Abdomen is soft.      Tenderness: There is no abdominal tenderness.   Musculoskeletal:         General: No deformity. Normal range of motion.      Cervical back: Normal range of motion and neck supple.      Right lower leg: No edema.      Left lower leg: No edema.   Skin:     General: Skin is warm and dry.      Findings: No erythema or rash.      Comments: Right chest wall vas cath. Dressing c/d/i. No sign of infection to site.   Neurological:      General: No focal deficit present.      Mental Status: He is alert and oriented to person, place, and time.      Motor: No weakness.   Psychiatric:         Mood and Affect: Mood normal.         Behavior: Behavior normal.         Thought Content: Thought content normal.         Judgment: Judgment normal.            Significant Labs:   CBC:   Recent Labs   Lab 09/25/24  0448 09/26/24  0403   WBC 0.02* 0.01*   HGB 8.0* 7.4*   HCT 23.0* 21.4*   PLT 15* 8*    and CMP:   Recent Labs   Lab 09/25/24  0448 09/26/24  0403    142   K 4.2 3.9    114*   CO2 24 23    94   BUN 12 12   CREATININE 0.7 0.6   CALCIUM 8.4* 7.8*   PROT 5.6* 5.3*   ALBUMIN 2.6* 2.5*   BILITOT 0.6 0.5   ALKPHOS 52* 50*   AST 14 14   ALT 12 9*   ANIONGAP 4* 5*       Diagnostic Results:  I have reviewed all pertinent imaging results/findings within the past 24 hours.

## 2024-09-26 NOTE — ASSESSMENT & PLAN NOTE
- Continue home gabapentin   Visit Information Date & Time Provider Department Dept. Phone Encounter #  
 8/22/2017 11:15 AM Nate Araya MD [address] [place], [place] [address]. [phone] [id number] Follow-up Instructions Return in about 3 months (around 11/22/2017), or if symptoms worsen or fail to improve, for mood disorder. Upcoming Health Maintenance Date Due  
 PAP AKA CERVICAL CYTOLOGY 7/1/2018 Pneumococcal 19-64 Highest Risk (2 of 3 - PCV13) 8/22/2018 DTaP/Tdap/Td series (2 - Td) 8/22/2027 Allergies as of 8/22/2017  Review Complete On: 8/22/2017 By: Jumana Lyman MD  
 No Known Allergies Current Immunizations  Reviewed on 3/11/2016 Name Date Influenza Vaccine (Quad) PF 10/13/2016  2:49 PM  
  
 Not reviewed this visit You Were Diagnosed With   
  
 Codes Comments Skin rash    -  Primary ICD-10-CM: R21 
ICD-9-CM: 782.1 Pruritic rash     ICD-10-CM: L28.2 ICD-9-CM: 698.2 Vitals BP Pulse Temp Resp Height(growth percentile) Weight(growth percentile) 117/83 (BP 1 Location: Left arm, BP Patient Position: Sitting) 81 97.7 °F (36.5 °C) (Oral) 18 5' 7\" (1.702 m) 155 lb 6.4 oz (70.5 kg) LMP SpO2 BMI OB Status Smoking Status 08/02/2016 98% 24.34 kg/m2 Hysterectomy Former Smoker BMI and BSA Data Body Mass Index Body Surface Area  
 24.34 kg/m 2 1.83 m 2 Preferred Pharmacy Pharmacy Name Phone [facility] [address] [place] [address] AT [facility] [phone] Your Updated Medication List  
  
   
This list is accurate as of: 8/22/17 11:44 AM.  Always use your most recent med list.  
  
  
  
  
 PARoxetine 20 mg tablet Commonly known as:  PAXIL Take 1 Tab by mouth daily. We Performed the Following REFERRAL TO DERMATOLOGY [REF19 Custom] Comments:  
 Please evaluate patient for recalcitrant pruritic rash. Patient request.  
  
Follow-up Instructions Return in about 3 months (around 11/22/2017), or if symptoms worsen or fail to improve, for mood disorder. Referral Information Referral ID Referred By Referred To  
  
 [id number] Ernestina MYRICK Not Available Visits Status Start Date End Date 1 New Request 8/22/17 8/22/18 If your referral has a status of pending review or denied, additional information will be sent to support the outcome of this decision. Patient Instructions Rash: Care Instructions Your Care Instructions A rash is any irritation or inflammation of the skin. Rashes have many possible causes, including allergy, infection, illness, heat, and emotional stress. Follow-up care is a key part of your treatment and safety. Be sure to make and go to all appointments, and call your doctor if you are having problems. Its also a good idea to know your test results and keep a list of the medicines you take. How can you care for yourself at home? · Wash the area with water only. Soap can make dryness and itching worse. Pat dry. · Put cold, wet cloths on the rash to reduce itching. · Keep cool, and stay out of the sun. · Leave the rash open to the air as much of the time as possible. · Sometimes petroleum jelly (Vaseline) can help relieve the discomfort caused by a rash. A moisturizing lotion, such as Cetaphil, also may help. Calamine lotion may help for rashes caused by contact with something (such as a plant or soap) that irritated the skin. Use it 3 or 4 times a day. · If your doctor prescribed a cream, use it as directed. If your doctor prescribed medicine, take it exactly as directed. · If your rash itches so badly that it interferes with your normal activities, take an over-the-counter antihistamine, such as diphenhydramine (Benadryl) or loratadine (Claritin). Read and follow all instructions on the label. When should you call for help? Call your doctor now or seek immediate medical care if: · You have signs of infection, such as: 
¨ Increased pain, swelling, warmth, or redness. ¨ Red streaks leading from the area. ¨ Pus draining from the area. ¨ A fever. · You have joint pain along with the rash. Watch closely for changes in your health, and be sure to contact your doctor if: 
· Your rash is changing or getting worse. For example, call if you have pain along with the rash, the rash is spreading, or you have new blisters. · You do not get better after 1 week. Where can you learn more? Go to http://alexander-celena.info/. Enter H692 in the search box to learn more about \"Rash: Care Instructions. \" Current as of: October 13, 2016 Content Version: 11.3 © 9947-2228 [employer]. Care instructions adapted under license by [employer] (which disclaims liability or warranty for this information). If you have questions about a medical condition or this instruction, always ask your healthcare professional. [place] [address] any warranty or liability for your use of this information. Introducing [facility]! Dear Jamir Hagan: Thank you for requesting a [employer] account. Our records indicate that you already have an active [employer] account. You can access your account anytime at [email or web address] Did you know that you can access your hospital and ER discharge instructions at any time in [employer] You can also review all of your test results from your hospital stay or ER visit. Additional Information If you have questions, please visit the Frequently Asked Questions section of the [employer] website at [email or web address] Remember, [employer] is NOT to be used for urgent needs. For medical emergencies, dial 911. Now available from your iPhone and Android! Please provide this summary of care documentation to your next provider. Your primary care clinician is listed as Randy Copeland. If you have any questions after today's visit, please call [phone].

## 2024-09-26 NOTE — PLAN OF CARE
Assumed care of pt. 06:45-19:15  Pt involved in plan of care and communicating needs throughout shift.      - D +7 Haplo Allo SCT for MDS  - AAOx4; Green[place]ic speaking  - c/o HA relieved by PRN tramadol/coffee  - Ambulates SBA; Up in chair most of shift  - Reg. Diet; RA  - Remains afebrile  - Voids via RR; BM x6 this shift  - NSR on telemonitor + cont. Pulse ox  - Skin intact; cream/LETS applied to hemorrhoids   - Electrolytes replaced  - R IJ vaughn w/NA + 20K @ 75  - 1 unit PLT administered; Type & Screen verified; Consents in chart; Premedicated as ordered; 2 RN verification per protocol; Pt. Tolerated well w/NADN;     - q1h patient rounds. All VSS; no acute events so far this shift. Non skid socks on; Pt. Instructed to call if any assistance is needed. Pt remaining free from falls or injury; Bed locked in lowest position with bed alarm on and audible; side rails up x3 & all belongings including call light within reach; Instructed to call prior to getting OOB ; Plan of care ongoing; All needs met at this time

## 2024-09-27 PROBLEM — T45.1X5A CHEMOTHERAPY-INDUCED NAUSEA: Status: ACTIVE | Noted: 2024-09-27

## 2024-09-27 PROBLEM — R11.0 CHEMOTHERAPY-INDUCED NAUSEA: Status: ACTIVE | Noted: 2024-09-27

## 2024-09-27 PROBLEM — R35.0 URINARY FREQUENCY: Status: ACTIVE | Noted: 2024-09-27

## 2024-09-27 LAB
ALBUMIN SERPL BCP-MCNC: 2.8 G/DL (ref 3.5–5.2)
ALP SERPL-CCNC: 54 U/L (ref 55–135)
ALT SERPL W/O P-5'-P-CCNC: 12 U/L (ref 10–44)
ANION GAP SERPL CALC-SCNC: 9 MMOL/L (ref 8–16)
ANISOCYTOSIS BLD QL SMEAR: SLIGHT
AST SERPL-CCNC: 15 U/L (ref 10–40)
BASOPHILS # BLD AUTO: 0 K/UL (ref 0–0.2)
BASOPHILS NFR BLD: 0 % (ref 0–1.9)
BILIRUB SERPL-MCNC: 0.5 MG/DL (ref 0.1–1)
BUN SERPL-MCNC: 8 MG/DL (ref 8–23)
CALCIUM SERPL-MCNC: 8.3 MG/DL (ref 8.7–10.5)
CHLORIDE SERPL-SCNC: 112 MMOL/L (ref 95–110)
CO2 SERPL-SCNC: 22 MMOL/L (ref 23–29)
CREAT SERPL-MCNC: 0.6 MG/DL (ref 0.5–1.4)
DIFFERENTIAL METHOD BLD: ABNORMAL
EOSINOPHIL # BLD AUTO: 0 K/UL (ref 0–0.5)
EOSINOPHIL NFR BLD: 0 % (ref 0–8)
ERYTHROCYTE [DISTWIDTH] IN BLOOD BY AUTOMATED COUNT: 13.1 % (ref 11.5–14.5)
EST. GFR  (NO RACE VARIABLE): >60 ML/MIN/1.73 M^2
GLUCOSE SERPL-MCNC: 103 MG/DL (ref 70–110)
HCT VFR BLD AUTO: 21.1 % (ref 40–54)
HGB BLD-MCNC: 7.2 G/DL (ref 14–18)
HYPOCHROMIA BLD QL SMEAR: ABNORMAL
IMM GRANULOCYTES # BLD AUTO: 0 K/UL (ref 0–0.04)
IMM GRANULOCYTES NFR BLD AUTO: 0 % (ref 0–0.5)
LYMPHOCYTES # BLD AUTO: 0 K/UL (ref 1–4.8)
LYMPHOCYTES NFR BLD: 0 % (ref 18–48)
MAGNESIUM SERPL-MCNC: 1.8 MG/DL (ref 1.6–2.6)
MCH RBC QN AUTO: 30.4 PG (ref 27–31)
MCHC RBC AUTO-ENTMCNC: 34.1 G/DL (ref 32–36)
MCV RBC AUTO: 89 FL (ref 82–98)
MONOCYTES # BLD AUTO: 0 K/UL (ref 0.3–1)
MONOCYTES NFR BLD: 0 % (ref 4–15)
NEUTROPHILS # BLD AUTO: 0 K/UL (ref 1.8–7.7)
NEUTROPHILS NFR BLD: 100 % (ref 38–73)
NRBC BLD-RTO: 0 /100 WBC
OVALOCYTES BLD QL SMEAR: ABNORMAL
PHOSPHATE SERPL-MCNC: 2.4 MG/DL (ref 2.7–4.5)
PLATELET # BLD AUTO: 30 K/UL (ref 150–450)
PMV BLD AUTO: 10.6 FL (ref 9.2–12.9)
POIKILOCYTOSIS BLD QL SMEAR: SLIGHT
POLYCHROMASIA BLD QL SMEAR: ABNORMAL
POTASSIUM SERPL-SCNC: 3.5 MMOL/L (ref 3.5–5.1)
PROT SERPL-MCNC: 5.5 G/DL (ref 6–8.4)
RBC # BLD AUTO: 2.37 M/UL (ref 4.6–6.2)
SODIUM SERPL-SCNC: 143 MMOL/L (ref 136–145)
TACROLIMUS BLD-MCNC: 6.3 NG/ML (ref 5–15)
WBC # BLD AUTO: 0.01 K/UL (ref 3.9–12.7)

## 2024-09-27 PROCEDURE — 97535 SELF CARE MNGMENT TRAINING: CPT | Mod: CO

## 2024-09-27 PROCEDURE — 25000003 PHARM REV CODE 250: Performed by: NURSE PRACTITIONER

## 2024-09-27 PROCEDURE — 63600175 PHARM REV CODE 636 W HCPCS: Performed by: INTERNAL MEDICINE

## 2024-09-27 PROCEDURE — 97530 THERAPEUTIC ACTIVITIES: CPT | Mod: CQ

## 2024-09-27 PROCEDURE — 84100 ASSAY OF PHOSPHORUS: CPT | Performed by: NURSE PRACTITIONER

## 2024-09-27 PROCEDURE — 80197 ASSAY OF TACROLIMUS: CPT | Performed by: INTERNAL MEDICINE

## 2024-09-27 PROCEDURE — 20600001 HC STEP DOWN PRIVATE ROOM

## 2024-09-27 PROCEDURE — 97116 GAIT TRAINING THERAPY: CPT | Mod: CQ

## 2024-09-27 PROCEDURE — 83735 ASSAY OF MAGNESIUM: CPT | Performed by: NURSE PRACTITIONER

## 2024-09-27 PROCEDURE — 80053 COMPREHEN METABOLIC PANEL: CPT | Performed by: NURSE PRACTITIONER

## 2024-09-27 PROCEDURE — 85025 COMPLETE CBC W/AUTO DIFF WBC: CPT | Performed by: NURSE PRACTITIONER

## 2024-09-27 PROCEDURE — 25000003 PHARM REV CODE 250: Performed by: INTERNAL MEDICINE

## 2024-09-27 PROCEDURE — 63600175 PHARM REV CODE 636 W HCPCS: Performed by: NURSE PRACTITIONER

## 2024-09-27 PROCEDURE — 25000003 PHARM REV CODE 250

## 2024-09-27 RX ORDER — LIDOCAINE 50 MG/G
1 PATCH TOPICAL
Status: DISCONTINUED | OUTPATIENT
Start: 2024-09-27 | End: 2024-10-16 | Stop reason: HOSPADM

## 2024-09-27 RX ORDER — TALC
6 POWDER (GRAM) TOPICAL NIGHTLY
Status: DISCONTINUED | OUTPATIENT
Start: 2024-09-27 | End: 2024-10-09

## 2024-09-27 RX ORDER — ONDANSETRON HYDROCHLORIDE 2 MG/ML
8 INJECTION, SOLUTION INTRAVENOUS EVERY 8 HOURS
Status: DISCONTINUED | OUTPATIENT
Start: 2024-09-27 | End: 2024-10-10

## 2024-09-27 RX ORDER — OXYCODONE HYDROCHLORIDE 5 MG/1
5 TABLET ORAL EVERY 4 HOURS PRN
Status: DISCONTINUED | OUTPATIENT
Start: 2024-09-27 | End: 2024-09-28

## 2024-09-27 RX ORDER — TAMSULOSIN HYDROCHLORIDE 0.4 MG/1
0.4 CAPSULE ORAL DAILY
Status: DISCONTINUED | OUTPATIENT
Start: 2024-09-27 | End: 2024-10-10

## 2024-09-27 RX ADMIN — LOPERAMIDE HYDROCHLORIDE 2 MG: 2 CAPSULE ORAL at 01:09

## 2024-09-27 RX ADMIN — POTASSIUM CHLORIDE 20 MEQ: 1500 TABLET, EXTENDED RELEASE ORAL at 06:09

## 2024-09-27 RX ADMIN — TACROLIMUS 1 MG: 1 CAPSULE ORAL at 05:09

## 2024-09-27 RX ADMIN — PANTOPRAZOLE SODIUM 40 MG: 40 TABLET, DELAYED RELEASE ORAL at 08:09

## 2024-09-27 RX ADMIN — Medication 1 DOSE: at 01:09

## 2024-09-27 RX ADMIN — MYCOPHENOLATE MOFETIL 1000 MG: 250 CAPSULE ORAL at 02:09

## 2024-09-27 RX ADMIN — Medication 400 MG: at 10:09

## 2024-09-27 RX ADMIN — HYDROCORTISONE: 25 CREAM TOPICAL at 08:09

## 2024-09-27 RX ADMIN — Medication 1 DOSE: at 08:09

## 2024-09-27 RX ADMIN — LIDOCAINE 5% 1 PATCH: 700 PATCH TOPICAL at 08:09

## 2024-09-27 RX ADMIN — SODIUM CHLORIDE AND POTASSIUM CHLORIDE 75 ML/HR: .9; .15 SOLUTION INTRAVENOUS at 11:09

## 2024-09-27 RX ADMIN — MYCOPHENOLATE MOFETIL 1000 MG: 250 CAPSULE ORAL at 08:09

## 2024-09-27 RX ADMIN — GABAPENTIN 600 MG: 300 CAPSULE ORAL at 08:09

## 2024-09-27 RX ADMIN — Medication 1 TABLET: at 02:09

## 2024-09-27 RX ADMIN — PROCHLORPERAZINE EDISYLATE 10 MG: 5 INJECTION INTRAMUSCULAR; INTRAVENOUS at 08:09

## 2024-09-27 RX ADMIN — POSACONAZOLE 300 MG: 100 TABLET, DELAYED RELEASE ORAL at 08:09

## 2024-09-27 RX ADMIN — VANCOMYCIN HYDROCHLORIDE 125 MG: KIT at 08:09

## 2024-09-27 RX ADMIN — TRAZODONE HYDROCHLORIDE 50 MG: 50 TABLET ORAL at 08:09

## 2024-09-27 RX ADMIN — ONDANSETRON 8 MG: 2 INJECTION INTRAMUSCULAR; INTRAVENOUS at 10:09

## 2024-09-27 RX ADMIN — Medication 1 TABLET: at 10:09

## 2024-09-27 RX ADMIN — PROCHLORPERAZINE EDISYLATE 10 MG: 5 INJECTION INTRAMUSCULAR; INTRAVENOUS at 07:09

## 2024-09-27 RX ADMIN — Medication 1 TABLET: at 06:09

## 2024-09-27 RX ADMIN — ACYCLOVIR 800 MG: 200 CAPSULE ORAL at 08:09

## 2024-09-27 RX ADMIN — Medication 400 MG: at 06:09

## 2024-09-27 RX ADMIN — LOPERAMIDE HYDROCHLORIDE 2 MG: 2 CAPSULE ORAL at 08:09

## 2024-09-27 RX ADMIN — TAMSULOSIN HYDROCHLORIDE 0.4 MG: 0.4 CAPSULE ORAL at 02:09

## 2024-09-27 RX ADMIN — URSODIOL 300 MG: 300 CAPSULE ORAL at 08:09

## 2024-09-27 RX ADMIN — POTASSIUM CHLORIDE 20 MEQ: 1500 TABLET, EXTENDED RELEASE ORAL at 08:09

## 2024-09-27 RX ADMIN — FILGRASTIM 300 MCG: 300 INJECTION, SOLUTION INTRAVENOUS; SUBCUTANEOUS at 08:09

## 2024-09-27 RX ADMIN — LIDOCAINE 5% 1 PATCH: 700 PATCH TOPICAL at 09:09

## 2024-09-27 RX ADMIN — Medication 1 DOSE: at 05:09

## 2024-09-27 RX ADMIN — Medication 6 MG: at 08:09

## 2024-09-27 RX ADMIN — LEVOFLOXACIN 500 MG: 500 TABLET, FILM COATED ORAL at 08:09

## 2024-09-27 RX ADMIN — LOPERAMIDE HYDROCHLORIDE 2 MG: 2 CAPSULE ORAL at 05:09

## 2024-09-27 RX ADMIN — LETERMOVIR 480 MG: 480 TABLET, FILM COATED ORAL at 09:09

## 2024-09-27 RX ADMIN — TRAMADOL HYDROCHLORIDE 50 MG: 50 TABLET, COATED ORAL at 06:09

## 2024-09-27 RX ADMIN — HYDROCORTISONE: 25 CREAM TOPICAL at 09:09

## 2024-09-27 RX ADMIN — TACROLIMUS 1 MG: 1 CAPSULE ORAL at 08:09

## 2024-09-27 RX ADMIN — Medication: at 08:09

## 2024-09-27 RX ADMIN — Medication 1 TABLET: at 05:09

## 2024-09-27 NOTE — ASSESSMENT & PLAN NOTE
- C/o headache 9/25  - Daily coffee drinker. Improved after drinking coffee.  - Suspect caffeine withdrawal.  - Can start caffeine tablet if patient is unable to drink coffee due to chemo side effects  - mild HA today, improved following caffeine intake

## 2024-09-27 NOTE — PLAN OF CARE
Problem: Adult Inpatient Plan of Care  Goal: Plan of Care Review  Outcome: Progressing     Problem: Adult Inpatient Plan of Care  Goal: Optimal Comfort and Wellbeing  Outcome: Progressing   VSS. Denies any pain. Afebrile. Family at bedside. No acute changes overnight. Bed alarm refused and call light within reach.

## 2024-09-27 NOTE — PROGRESS NOTES
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 Will have first tacro level today 6.3. Continue on 1mg BID. Next level 9/30. Worsening nausea with low appetite, started on IV zofran ATC, continues on IVF. Diarrhea improved with scheduled imodium, still has prn lomotil. Reports new worsening bilateral shoulder pain not controlled with tramadol, started on lidocaine patches and increased pain meds to oxy. Continues drinking coffee for caffeine headaches. Wife reporting frequent urination at night with limited output, will start on flomax. Started melatonin for insomnia/restlessness at night. Replacing K. Afebrile, VSS    Objective:     Vital Signs (Most Recent):  Temp: 97 °F (36.1 °C) (09/27/24 1140)  Pulse: 88 (09/27/24 1140)  Resp: 16 (09/27/24 1140)  BP: (!) 145/72 (09/27/24 1140)  SpO2: 96 % (09/27/24 1140) Vital Signs (24h Range):  Temp:  [97 °F (36.1 °C)-98.8 °F (37.1 °C)] 97 °F (36.1 °C)  Pulse:  [] 88  Resp:  [16-18] 16  SpO2:  [95 %-99 %] 96 %  BP: (125-145)/(64-78) 145/72     Weight: 74 kg (163 lb 2.3 oz)  Body mass index is 24.09 kg/m².  Body surface area is 1.9 meters squared.      Intake/Output - Last 3 Shifts         09/25 0700 09/26 0659 09/26 0700 09/27 0659 09/27 0700 09/28 0659    P.O. 400 300 480    I.V. (mL/kg) 1778.6 (24.1) 897.1 (12.1) 1281.9 (17.3)    IV Piggyback 184.7      Total Intake(mL/kg) 2363.3 (32) 1197.1 (16.2) 1761.9 (23.8)    Urine (mL/kg/hr) 1530 (0.9) 950 (0.5) 200 (0.4)    Emesis/NG output   200    Stool 0 0 0    Total Output 1530 950 400    Net +833.3 +247.1 +1361.9           Urine Occurrence 5 x      Stool Occurrence 3 x 7 x 1 x             Physical Exam  Vitals and nursing note reviewed.   Constitutional:       Subjective:       Patient ID: Tamiko Youssef is a 83 y.o. female.    Chief Complaint: No chief complaint on file.    HPI  83 year old  F with PMH of glaucoma, asthma, HTN, asthma here for evaluation. She has pain in hands and wrists.  She also has pain in left buttock that sometimes radiates down her leg. She gets injections in the lower back at [facility] with mild improvement.  Pain level can be as 8/10. Pain is sharp. Denies trigger for her pain. She gets swelling of left ankle and both knees off and on for years. Sometimes she feel like her feet get swollen.  She uses voltaren gel on occasion which improves her pain a little. Denies rashes, oral ulcers, fevers, raynauds, photosensitivity, or pleurisy.    Family hx: negative for RA      Past Medical History:   Diagnosis Date    Asthma     Cataract     Glaucoma     Hypertension        Review of Systems   Constitutional: Positive for fatigue. Negative for activity change, appetite change, chills and diaphoresis.   HENT: Negative for congestion, ear discharge, ear pain, facial swelling, mouth sores, sinus pressure, sneezing, sore throat, tinnitus and trouble swallowing.    Eyes: Negative for photophobia, pain, discharge, redness, itching and visual disturbance.   Respiratory: Negative for apnea, chest tightness, shortness of breath, wheezing and stridor.    Cardiovascular: Negative for leg swelling.   Gastrointestinal: Negative for abdominal distention, abdominal pain, anal bleeding, blood in stool, constipation, diarrhea and nausea.   Endocrine: Negative for cold intolerance and heat intolerance.   Genitourinary: Negative for difficulty urinating and dysuria.   Musculoskeletal: Positive for arthralgias, back pain, gait problem and joint swelling. Negative for neck pain and neck stiffness.   Skin: Negative for color change, pallor, rash and wound.   Neurological: Negative for dizziness, seizures, light-headedness and numbness.   Hematological: Negative for   Appearance: Normal appearance. He is well-developed.   HENT:      Head: Normocephalic and atraumatic.      Nose: Nose normal.      Mouth/Throat:      Pharynx: No oropharyngeal exudate or posterior oropharyngeal erythema.   Eyes:      General:         Right eye: No discharge.         Left eye: No discharge.      Extraocular Movements: Extraocular movements intact.      Conjunctiva/sclera: Conjunctivae normal.   Cardiovascular:      Rate and Rhythm: Normal rate and regular rhythm.      Heart sounds: Normal heart sounds. No murmur heard.  Pulmonary:      Effort: Pulmonary effort is normal. No respiratory distress.      Breath sounds: Normal breath sounds. No wheezing or rales.   Abdominal:      General: Bowel sounds are normal. There is no distension.      Palpations: Abdomen is soft.      Tenderness: There is no abdominal tenderness.   Musculoskeletal:         General: No deformity. Normal range of motion.      Cervical back: Normal range of motion and neck supple.      Right lower leg: No edema.      Left lower leg: No edema.   Skin:     General: Skin is warm and dry.      Findings: No erythema or rash.      Comments: Right chest wall vas cath. Dressing c/d/i. No sign of infection to site.   Neurological:      General: No focal deficit present.      Mental Status: He is alert and oriented to person, place, and time.      Motor: No weakness.   Psychiatric:         Mood and Affect: Mood normal.         Behavior: Behavior normal.         Thought Content: Thought content normal.         Judgment: Judgment normal.            Significant Labs:   CBC:   Recent Labs   Lab 09/26/24  0403 09/27/24  0400   WBC 0.01* 0.01*   HGB 7.4* 7.2*   HCT 21.4* 21.1*   PLT 8* 30*    and CMP:   Recent Labs   Lab 09/26/24  0403 09/27/24  0400    143   K 3.9 3.5   * 112*   CO2 23 22*   GLU 94 103   BUN 12 8   CREATININE 0.6 0.6   CALCIUM 7.8* 8.3*   PROT 5.3* 5.5*   ALBUMIN 2.5* 2.8*   BILITOT 0.5 0.5   ALKPHOS 50* 54*   AST 14  adenopathy. Does not bruise/bleed easily.   Psychiatric/Behavioral: Negative for sleep disturbance. The patient is not nervous/anxious.          Objective:   There were no vitals taken for this visit.     Physical Exam   Constitutional: She is oriented to person, place, and time.   HENT:   Head: Normocephalic and atraumatic.   Right Ear: External ear normal.   Left Ear: External ear normal.   Nose: Nose normal.   Mouth/Throat: Oropharynx is clear and moist. No oropharyngeal exudate.   Eyes: Pupils are equal, round, and reactive to light. Conjunctivae and EOM are normal. Right eye exhibits no discharge. Left eye exhibits no discharge. No scleral icterus.   Neck: No JVD present. No thyromegaly present.   Cardiovascular: Normal rate, regular rhythm, normal heart sounds and intact distal pulses. Exam reveals no gallop and no friction rub.   No murmur heard.  Pulmonary/Chest: Effort normal and breath sounds normal. No respiratory distress. She has no wheezes. She has no rales. She exhibits no tenderness.   Abdominal: Soft. Bowel sounds are normal. She exhibits no distension and no mass. There is no abdominal tenderness. There is no rebound and no guarding.   Musculoskeletal:         General: No swelling, tenderness, deformity, signs of injury or edema.      Cervical back: Neck supple.      Left lower leg: No edema.   Lymphadenopathy:     She has no cervical adenopathy.   Neurological: She is alert and oriented to person, place, and time. No cranial nerve deficit. Gait normal. Coordination normal.   Skin: Skin is dry. No rash noted. No erythema. No pallor.   Psychiatric: Affect and judgment normal.        No data to display     Assessment:     83 year old  F with PMH of glaucoma, asthma, HTN, here for evaluation of joint pain.Sshe has diffuse arthralgias which I suspect are from DJD.  She is very tender in her left ankle so if work up is negative, we will get MRI of left ankle.    1. Hand arthritis            Plan:        Problem List Items Addressed This Visit        Orthopedic    Hand arthritis        Labs  xrays  Start tylenol 650mg every 8 hours as needed  Start voltaren gel QID PRN    #obesity: encourage weight loss  **    45 * minutes of total time spent on the encounter, which includes face to face time and non-face to face time preparing to see the patient (eg, review of tests), Obtaining and/or reviewing separately obtained history, Documenting clinical information in the electronic or other health record, Independently interpreting results (not separately reported) and communicating results to the patient/family/caregiver, or Care coordination (not separately reported).      15   ALT 9* 12   ANIONGAP 5* 9       Diagnostic Results:  I have reviewed all pertinent imaging results/findings within the past 24 hours.  Assessment/Plan:     * S/P allogeneic bone marrow transplant  - Patient of Dr. 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 Donor is A+.  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 Patient will start letermovir ppx on 9/24/24.  - Received fresh BMT product on 9/19/24 with a CD34 dose of 3.55 x10^6.  - Of note, cilostazol may interact with tacro. (Currently on hold for plts < 50K).  - See treatment plan below:    Planned conditioning regimen:  Fludarabine on Day -5, -4, -3, and -2  Melphalan on Day -2  TBI on Day -1     Planned  GVHD Prophylaxis:  Cyclophosphamide (with Mesna) on Days +3 and +4  Mycophenolate starting on Day +5  Tacrolimus starting on Day +5     Antimicrobial Prophylaxis:  Acyclovir starting on Day -5  Levofloxacin starting on Day -1  Micafungin on Day -1 through Day +4  Letermovir starting on Day +5 (if CMV PCR drawn on day 0 results negative)  Posaconazole starting on Day +5  Bactrim starting on Day +30     SOS/VOD Prophylaxis:  Ursodiol on Day -5 through Day +30      Growth Factor Support:  Neupogen starting on Day +5     Caregiver: wife   Post-transplant discharge plans: home        Myelodysplastic syndrome  From Dr Hickey's clinic note 8/5:  Stem cell transplant candidate: We discussed the role for allogeneic stem cell transplantation in this disease process as a potentially curative option. We had an extensive discussion about the rationale, logistics, risks, and benefits. We reviewed the requirement to stay in the [place] area for 100 days with a caregiver at all times. We discussed the risks, including infection, graft failure, organ  toxicity, graft versus host disease, relapse of disease, and secondary cancers. We reviewed the need for long-term immunosuppression and need for close monitoring. HCT-CI of 1 (intermediate risk). We had a prolonged discussion today regarding his recent deconditioning, Cdiff, and underlying disease. He is now much improved since last seen, and is improving his strength since starting to work with PT. Diarrhea now controlled. We discussed that our plan to move forward with the transplant process.   Coordinator: Fay Stephens  Regimen:  + 2Gy TBI  Donor: son (haplo)   Graft source: BM  - Admitted 9/13/24 for a  TBI PT Cy haplo (son) allogeneic BMT    Pancytopenia due to antineoplastic chemotherapy  - Daily CBC while inpatient  - Transfuse for hgb <7 Plt  or <10K  - Continue antimicrobial ppx  - Holding cilostazol for plts < 50K    Chemotherapy induced diarrhea  - C-diff neg 9/24  - Of note, was treated empirically for c-diff with oral vanc prior to admission and is receiving oral vanc ppx while admitted.  - persists so imodium now scheduled ATC and added prn lomotil (9/26)    Neutropenic fever  - First fever with tmax 101.8F on 9/23  - Infection w/u unremarkable thus far  - No fevers since 9/23, Cefepime stopped on 9/25 and back on oral ppx LVQ.  RESOLVED    Hemorrhoids  - d/t chemo induced diarrhea  - has anusol, tucks pads, and LETS gel  - patient denies any bleeding at site    Urinary frequency  - reports increased nightly frequency and urgency with low output  - no formal BPH diagnosis; will start flomax and monitor for improvement    Chemotherapy-induced nausea  - scheduled zofran IV 8mg q8h on 9/27  - has prn compazine and ativan    Headache  - C/o headache 9/25  - Daily coffee drinker. Improved after drinking coffee.  - Suspect caffeine withdrawal.  - Can start caffeine tablet if patient is unable to drink coffee due to chemo side effects  - mild HA today, improved following caffeine  intake    Insomnia  - has PRN Trazodone however not currently using  - started on scheduled melatonin    History of Clostridioides difficile infection  - Was treated empirically for c-diff with oral vanc prior to admission.  - Continue oral vanc ppx per transplant protocol      Neuropathy  - Continue home gabapentin    Hypertension, essential  - Holding home Coreg for fluid responsive hypotension. Resume as indicated.    Coronary artery disease involving native coronary artery of native heart without angina pectoris  - Holding home Coreg for hypotension. Can resume as indicated.        VTE Risk Mitigation (From admission, onward)           Ordered     heparin, porcine (PF) 100 unit/mL injection flush 300 Units  As needed (PRN)         09/13/24 3826                    Disposition: Remains inpatient    Judy Anthony NP  Bone Marrow Transplant  Janusz Ma - Oncology ([facility])

## 2024-09-27 NOTE — PROGRESS NOTES
"Janusz Ma - Oncology ([facility])  Adult Nutrition  Progress Note    SUMMARY     Recommendation/Intervention:   Continue current regular diet as tolerated, encourage PO intake  Continue Boost Plus BID to promote adequate kcal/protein consumption  RD to monitor and follow-up as needed    Goals: Meet % EEN, EPN by RD f/u date  Nutrition Goal Status: progressing towards goal  Communication of RD Recs: other (comment) (POC)    Assessment and Plan    Nutrition Problem:  Increased nutrient needs     Related to (etiology):   Physiological causes      Signs and Symptoms (as evidenced by):   Upcoming SCT     Interventions(treatment strategy):  Collaboration of nutrition care w/ other providers     Nutrition Diagnosis Status:   Continues    Reason for Assessment    Reason For Assessment: RD follow-up  Diagnosis: other (see comments) (_)  Relevant Medical History: HTN  Interdisciplinary Rounds: did not attend  General Information Comments: Pt followed by RD team. 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 Remains on regular diet with ONS, consuming 50% of recent meals.   Nutrition Discharge Planning: Post transplant nutrition education provided on 9/14. Food safety/drug interactions emphasized. General healthy diet recommended. RD name/contact information, education material left. No other needs identified.     Nutrition Risk Screen    Nutrition Risk Screen: reduced oral intake over the last month    Nutrition/Diet History    Patient Reported Diet/Restrictions/Preferences: general  Spiritual, Cultural Beliefs, Alevism Practices, Values that Affect Care: no  Food Allergies: NKFA  Factors Affecting Nutritional Intake: None identified at this time    Anthropometrics    Temp: 97.8 °F (36.6 °C)  Height Method: Stated  Height: 5' 9" (175.3 cm)  Height (inches): 69 in  Weight Method: Standard Scale  Weight: 74 kg (163 lb 2.3 oz)  Weight (lb): 163.14 lb  Ideal Body Weight (IBW), Male: " 160 lb  % Ideal Body Weight, Male (lb): 102.24 %  BMI (Calculated): 24.1  BMI Grade: 18.5-24.9 - normal  Weight back to admit weight    Lab/Procedures/Meds    Pertinent Labs Reviewed: reviewed  Pertinent Labs Comments: Hgb: 7.2, Hct: 21.1, CO2: 22, Calcium: 8.3, Phosphorus: 2.4  Pertinent Medications Reviewed: reviewed   acyclovir  800 mg Oral BID    filgrastim  300 mcg Subcutaneous Daily    gabapentin  600 mg Oral BID    hydrocortisone   Rectal BID    letermovir  480 mg Oral Daily    LETS   Topical (Top) BID    levoFLOXacin  500 mg Oral Daily    LIDOcaine  1 patch Transdermal Q24H    LIDOcaine  1 patch Transdermal Q24H    loperamide  2 mg Oral QID    mycophenolate  1,000 mg Oral TID    ondansetron  8 mg Intravenous Q8H    pantoprazole  40 mg Oral Daily    posaconazole  300 mg Oral Daily    sodium bicarb-sodium chloride  1 Dose Swish & Spit QID    [START ON 10/19/2024] sulfamethoxazole-trimethoprim 800-160mg  1 tablet Oral Every Mon, Wed, Fri    tacrolimus  1 mg Oral Daily AM    tacrolimus  1 mg Oral Daily PM    ursodioL  300 mg Oral BID    vancomycin  125 mg Oral BID     Estimated/Assessed Needs    Weight Used For Calorie Calculations: 74.2 kg (163 lb 9.3 oz)  Energy Calorie Requirements (kcal): 1871 kcal/d  Energy Need Method: [place]-St Jeor (1.25 PAL)  Protein Requirements: 89 g/d (1.2 g/kg)  Weight Used For Protein Calculations: 74.2 kg (163 lb 9.3 oz)     Estimated Fluid Requirement Method: other (see comments) (Per MD)  RDA Method (mL): 1871     Nutrition Prescription Ordered    Current Diet Order: Regular  Oral Nutrition Supplement: Boost Plus BID    Evaluation of Received Nutrient/Fluid Intake    I/O: + 10.6 L since admit  Energy Calories Required: not meeting needs  Protein Required: not meeting needs  Fluid Required: other (see comments) (As per MD)  Comments: LBM 9/26  Tolerance: tolerating  % Intake of Estimated Energy Needs: 50 - 75 %  % Meal Intake: 50 - 75 %    Nutrition Risk    Level of  Risk/Frequency of Follow-up:  (1x/week)     Monitor and Evaluation    Food and Nutrient Intake: food and beverage intake, energy intake  Food and Nutrient Adminstration: diet order  Physical Activity and Function: nutrition-related ADLs and IADLs  Anthropometric Measurements: weight change, weight  Biochemical Data, Medical Tests and Procedures: gastrointestinal profile, inflammatory profile, lipid profile, glucose/endocrine profile, electrolyte and renal panel  Nutrition-Focused Physical Findings: overall appearance     Nutrition Follow-Up    RD Follow-up?: Yes

## 2024-09-27 NOTE — ASSESSMENT & PLAN NOTE
- reports increased nightly frequency and urgency with low output  - no formal BPH diagnosis; will start flomax and monitor for improvement

## 2024-09-27 NOTE — PLAN OF CARE
Recommendation/Intervention:   Continue current regular diet as tolerated, encourage PO intake  Continue Boost Plus BID to promote adequate kcal/protein consumption  RD to monitor and follow-up as needed     Goals: Meet % EEN, EPN by RD f/u date  Nutrition Goal Status: progressing towards goal  Communication of RD Recs: other (comment) (POC)

## 2024-09-27 NOTE — ASSESSMENT & PLAN NOTE
- C-diff neg 9/24  - Of note, was treated empirically for c-diff with oral vanc prior to admission and is receiving oral vanc ppx while admitted.  - persists so imodium now scheduled ATC and added prn lomotil (9/26)

## 2024-09-27 NOTE — SUBJECTIVE & OBJECTIVE
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 Will have first tacro level today 6.3. Continue on 1mg BID. Next level 9/30. Worsening nausea with low appetite, started on IV zofran ATC, continues on IVF. Diarrhea improved with scheduled imodium, still has prn lomotil. Reports new worsening bilateral shoulder pain not controlled with tramadol, started on lidocaine patches and increased pain meds to oxy. Continues drinking coffee for caffeine headaches. Wife reporting frequent urination at night with limited output, will start on flomax. Started melatonin for insomnia/restlessness at night. Replacing K. Afebrile, VSS    Objective:     Vital Signs (Most Recent):  Temp: 97 °F (36.1 °C) (09/27/24 1140)  Pulse: 88 (09/27/24 1140)  Resp: 16 (09/27/24 1140)  BP: (!) 145/72 (09/27/24 1140)  SpO2: 96 % (09/27/24 1140) Vital Signs (24h Range):  Temp:  [97 °F (36.1 °C)-98.8 °F (37.1 °C)] 97 °F (36.1 °C)  Pulse:  [] 88  Resp:  [16-18] 16  SpO2:  [95 %-99 %] 96 %  BP: (125-145)/(64-78) 145/72     Weight: 74 kg (163 lb 2.3 oz)  Body mass index is 24.09 kg/m².  Body surface area is 1.9 meters squared.      Intake/Output - Last 3 Shifts         09/25 0700 09/26 0659 09/26 0700 09/27 0659 09/27 0700 09/28 0659    P.O. 400 300 480    I.V. (mL/kg) 1778.6 (24.1) 897.1 (12.1) 1281.9 (17.3)    IV Piggyback 184.7      Total Intake(mL/kg) 2363.3 (32) 1197.1 (16.2) 1761.9 (23.8)    Urine (mL/kg/hr) 1530 (0.9) 950 (0.5) 200 (0.4)    Emesis/NG output   200    Stool 0 0 0    Total Output 1530 950 400    Net +833.3 +247.1 +1361.9           Urine Occurrence 5 x      Stool Occurrence 3 x 7 x 1 x             Physical Exam  Vitals and nursing note reviewed.   Constitutional:       Appearance: Normal appearance. He is well-developed.   HENT:      Head: Normocephalic and atraumatic.      Nose: Nose normal.      Mouth/Throat:      Pharynx: No oropharyngeal exudate or posterior oropharyngeal  erythema.   Eyes:      General:         Right eye: No discharge.         Left eye: No discharge.      Extraocular Movements: Extraocular movements intact.      Conjunctiva/sclera: Conjunctivae normal.   Cardiovascular:      Rate and Rhythm: Normal rate and regular rhythm.      Heart sounds: Normal heart sounds. No murmur heard.  Pulmonary:      Effort: Pulmonary effort is normal. No respiratory distress.      Breath sounds: Normal breath sounds. No wheezing or rales.   Abdominal:      General: Bowel sounds are normal. There is no distension.      Palpations: Abdomen is soft.      Tenderness: There is no abdominal tenderness.   Musculoskeletal:         General: No deformity. Normal range of motion.      Cervical back: Normal range of motion and neck supple.      Right lower leg: No edema.      Left lower leg: No edema.   Skin:     General: Skin is warm and dry.      Findings: No erythema or rash.      Comments: Right chest wall vas cath. Dressing c/d/i. No sign of infection to site.   Neurological:      General: No focal deficit present.      Mental Status: He is alert and oriented to person, place, and time.      Motor: No weakness.   Psychiatric:         Mood and Affect: Mood normal.         Behavior: Behavior normal.         Thought Content: Thought content normal.         Judgment: Judgment normal.            Significant Labs:   CBC:   Recent Labs   Lab 09/26/24  0403 09/27/24  0400   WBC 0.01* 0.01*   HGB 7.4* 7.2*   HCT 21.4* 21.1*   PLT 8* 30*    and CMP:   Recent Labs   Lab 09/26/24  0403 09/27/24  0400    143   K 3.9 3.5   * 112*   CO2 23 22*   GLU 94 103   BUN 12 8   CREATININE 0.6 0.6   CALCIUM 7.8* 8.3*   PROT 5.3* 5.5*   ALBUMIN 2.5* 2.8*   BILITOT 0.5 0.5   ALKPHOS 50* 54*   AST 14 15   ALT 9* 12   ANIONGAP 5* 9       Diagnostic Results:  I have reviewed all pertinent imaging results/findings within the past 24 hours.

## 2024-09-27 NOTE — ASSESSMENT & PLAN NOTE
- Patient of Dr. 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 Donor is A+.  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 Patient will start letermovir ppx on 9/24/24.  - Received fresh BMT product on 9/19/24 with a CD34 dose of 3.55 x10^6.  - Of note, cilostazol may interact with tacro. (Currently on hold for plts < 50K).  - See treatment plan below:    Planned conditioning regimen:  Fludarabine on Day -5, -4, -3, and -2  Melphalan on Day -2  TBI on Day -1     Planned  GVHD Prophylaxis:  Cyclophosphamide (with Mesna) on Days +3 and +4  Mycophenolate starting on Day +5  Tacrolimus starting on Day +5     Antimicrobial Prophylaxis:  Acyclovir starting on Day -5  Levofloxacin starting on Day -1  Micafungin on Day -1 through Day +4  Letermovir starting on Day +5 (if CMV PCR drawn on day 0 results negative)  Posaconazole starting on Day +5  Bactrim starting on Day +30     SOS/VOD Prophylaxis:  Ursodiol on Day -5 through Day +30      Growth Factor Support:  Neupogen starting on Day +5     Caregiver: wife   Post-transplant discharge plans: home

## 2024-09-27 NOTE — PT/OT/SLP PROGRESS
Occupational Therapy   Treatment    Name: Guillaume Salinas  MRN: [id number]  Admitting Diagnosis:  S/P allogeneic bone marrow transplant       Recommendations:     Discharge Recommendations: No Therapy Indicated  Discharge Equipment Recommendations:  none  Barriers to discharge:  None    Assessment:     Guillaume Salinas is a 69 y.o. male with a medical diagnosis of S/P allogeneic bone marrow transplant.  He presents with the following performance deficits affecting function are impaired endurance, impaired functional mobility, impaired self care skills, decreased safety awareness. Pt participated in therapeutic exercise interventions to prevent further debility. Goals remain appropriate at the time. Continue OT POC       Rehab Prognosis:  Good; patient would benefit from acute skilled OT services to address these deficits and reach maximum level of function.       Plan:     Patient to be seen 2 x/week to address the above listed problems via self-care/home management, therapeutic activities, therapeutic exercises, neuromuscular re-education  Plan of Care Expires: 10/15/24  Plan of Care Reviewed with: patient, spouse    Subjective     Chief Complaint: none stated   Patient/Family Comments/goals: PLOF  Pain/Comfort:  Pain Rating 1: 0/10    Objective:     Communicated with: nurse luis  prior to session.  Patient found supine with peripheral IV upon OT entry to room.    General Precautions: Standard, fall    Orthopedic Precautions:N/A  Braces: N/A  Respiratory Status: Room air     Occupational Performance:     Bed Mobility:    Patient completed Scooting/Bridging with supervision  Patient completed Supine to Sit with supervision     Functional Mobility/Transfers:  Patient completed Sit <> Stand Transfer with stand by assistance with no assistive device   Patient completed Chair Transfer using Step Transfer technique with stand by assistance with no assistive device, IV pole in tow  Patient completed  Toilet Transfer Step Transfer technique with stand by assistance with  no AD and grab bars, IV pole in tow  Functional Mobility: to/from bathroom 20 ft + 15 ft SBA no AD, IV pole in tow    Activities of Daily Living:  Grooming: stand by assistance for hand hygiene   Upper Body Dressing: stand by assistance to don gown as robe seated EOB  Toileting: independence for pericare seated on toilet       [facility] 6 Click ADL: 24    Treatment & Education:  Pt edu on goals and progress.   Addressed all pt questions/concerns with in the [facility] scope of practice.      Patient left up in chair with all lines intact, call button in reach, nurse notified, and wife present    GOALS:   Multidisciplinary Problems       Occupational Therapy Goals          Problem: Occupational Therapy    Goal Priority Disciplines Outcome Interventions   Occupational Therapy Goal     OT, PT/OT Progressing    Description: Goals to be met by: 10/29/2024     Patient will increase functional independence with ADLs by performing:    Pt will demonstrate independence with grooming x 3 sessions.  Pt will demonstrate independence with UE dressing x 3 sessions.  Pt will demonstrate independence with LE dressing x 3 sessions.  Pt will demonstrate independence with Toileting x 3 sessions.  Pt will demonstrate independence with transfers x 3 sessions.  Pt will complete functional mobility to simulate community distances with [place] x 3 sessions in order to maximize functional activity tolerance required for occupations of choice.   Pt will demonstrate independence with HEP for UE strengthening/endurance with independence.                           Time Tracking:     OT Date of Treatment: 09/27/24  OT Start Time: 1110  OT Stop Time: 1127  OT Total Time (min): 17 min    Billable Minutes:Self Care/Home Management 17    OT/DIMITRIS: DIMITRIS     Number of DIMITRIS visits since last OT visit: 1    9/27/2024

## 2024-09-27 NOTE — PT/OT/SLP PROGRESS
"Physical Therapy Treatment    Patient Name:  Guillaume Salinas   MRN:  [id number]    Recommendations:     Discharge Recommendations: No Therapy Indicated  Discharge Equipment Recommendations: none  Barriers to discharge: None    Assessment:     Guillaume Salinas is a 69 y.o. male admitted with a medical diagnosis of S/P allogeneic bone marrow transplant.  He presents with the following impairments/functional limitations: weakness, impaired endurance, gait instability requiring sup assistance and verbal cues for bed mob, sit < > stand transitions, and gait to prevent falls due to weakness, mild instability.   Pt remains motivated to participate in PT session and will cont to benefit from skilled PT intervention..  .    Rehab Prognosis: Good; patient would benefit from acute skilled PT services to address these deficits and reach maximum level of function.    Recent Surgery: * No surgery found *      Plan:     During this hospitalization, patient to be seen 2 x/week to address the identified rehab impairments via gait training, therapeutic activities, therapeutic exercises, neuromuscular re-education and progress toward the following goals:    Plan of Care Expires:  10/15/24    Subjective     Chief Complaint: weakness, fatigue   Patient/Family Comments/goals: "I've been having diarrhea today"  Pain/Comfort:  Pain Rating 1: 0/10  Pain Rating Post-Intervention 1: 0/10      Objective:     Communicated with nurse (Christa) prior to session.  Patient found HOB elevated with central line (wife present) upon PT entry to room.     General Precautions: Standard, fall  Orthopedic Precautions: N/A  Braces: N/A  Respiratory Status: Room air     Functional Mobility:  Bed Mobility:     Supine to Sit: supervision  Sit to Supine: supervision  Transfers:     Sit to Stand:  from EOB/toilet seat with SBA with no AD  Gait: no AD/IV pole with CGA then SBA 300ft in hallway  Balance: static standing with no AD SBA/close " sup      AM-PAC 6 CLICK MOBILITY  Turning over in bed (including adjusting bedclothes, sheets and blankets)?: 4  Sitting down on and standing up from a chair with arms (e.g., wheelchair, bedside commode, etc.): 3  Moving from lying on back to sitting on the side of the bed?: 3  Moving to and from a bed to a chair (including a wheelchair)?: 3  Need to walk in hospital room?: 3  Climbing 3-5 steps with a railing?: 3  Basic Mobility Total Score: 19       Treatment & Education:  Patient provided with daily orientation and goals of this PT session. They were educated to call for assistance and to transfer with hospital staff only.  Also, pt was educated on the effects of prolonged immobility and the importance of performing OOB activity and exercises to promote healing and reduce recovery time    Patient left  L side lying with HOB elevated  with all lines intact, call button in reach, nurse notified, and wife present..    GOALS:   Multidisciplinary Problems       Physical Therapy Goals          Problem: Physical Therapy    Goal Priority Disciplines Outcome Goal Variances Interventions   Physical Therapy Goal     PT, PT/OT Progressing     Description: Goals to be met by: 10/15/24     Patient will increase functional independence with mobility by performin. Supine to sit with [place]  2. Sit to supine with [place]  3. Sit to stand transfer with [place]  4. Bed to chair transfer with [place] using No Assistive Device  5. Gait  x 500 feet with [place] using No Assistive Device.   6. Lower extremity exercise program x15 reps per handout, with assistance as needed                         Time Tracking:     PT Received On: 24  PT Start Time: 1451     PT Stop Time: 1515  PT Total Time (min): 24 min     Billable Minutes: Gait Training 15 and Therapeutic Activity 9    Treatment Type: Treatment  PT/PTA: PTA     Number of PTA visits since last PT visit: 2     2024

## 2024-09-27 NOTE — PLAN OF CARE
Day +8 Haplo SCT. Pt involved in plan of care and communicating needs throughout shift. Up in room and to bathroom with standby assist; voiding without difficulty. Pt c/o stormy shoulder pain; lidocaine patches ordered and applied. Pt had one occurrence of emesis after taking am meds; zofran ordered and administered as scheduled. Pt had 7 loose bm's; on scheduled imodium. Afebrile, all VSS. Pt remaining free from falls or injury throughout shift. Bed locked and in lowest position, personal belongings and call light within reach, non skid socks on when OOB. Pt instructed to call for assistance as needed. Family at bedside. Hourly rounding done on pt.

## 2024-09-27 NOTE — ASSESSMENT & PLAN NOTE
From Dr Hickey's clinic note 8/5:  Stem cell transplant candidate: We discussed the role for allogeneic stem cell transplantation in this disease process as a potentially curative option. We had an extensive discussion about the rationale, logistics, risks, and benefits. We reviewed the requirement to stay in the [place] area for 100 days with a caregiver at all times. We discussed the risks, including infection, graft failure, organ toxicity, graft versus host disease, relapse of disease, and secondary cancers. We reviewed the need for long-term immunosuppression and need for close monitoring. HCT-CI of 1 (intermediate risk). We had a prolonged discussion today regarding his recent deconditioning, Cdiff, and underlying disease. He is now much improved since last seen, and is improving his strength since starting to work with PT. Diarrhea now controlled. We discussed that our plan to move forward with the transplant process.   Coordinator: Fay Stephens  Regimen:  + 2Gy TBI  Donor: son (haplo)   Graft source: BM  - Admitted 9/13/24 for a  TBI PT Cy haplo (son) allogeneic BMT

## 2024-09-28 LAB
ALBUMIN SERPL BCP-MCNC: 2.9 G/DL (ref 3.5–5.2)
ALP SERPL-CCNC: 53 U/L (ref 55–135)
ALT SERPL W/O P-5'-P-CCNC: 13 U/L (ref 10–44)
ANION GAP SERPL CALC-SCNC: 7 MMOL/L (ref 8–16)
ANISOCYTOSIS BLD QL SMEAR: SLIGHT
AST SERPL-CCNC: 17 U/L (ref 10–40)
BACTERIA BLD CULT: NORMAL
BACTERIA BLD CULT: NORMAL
BASOPHILS # BLD AUTO: 0 K/UL (ref 0–0.2)
BASOPHILS NFR BLD: 0 % (ref 0–1.9)
BILIRUB SERPL-MCNC: 0.5 MG/DL (ref 0.1–1)
BLD PROD TYP BPU: NORMAL
BLOOD UNIT EXPIRATION DATE: NORMAL
BLOOD UNIT TYPE CODE: 7300
BLOOD UNIT TYPE: NORMAL
BUN SERPL-MCNC: 8 MG/DL (ref 8–23)
CALCIUM SERPL-MCNC: 8.4 MG/DL (ref 8.7–10.5)
CHLORIDE SERPL-SCNC: 113 MMOL/L (ref 95–110)
CO2 SERPL-SCNC: 24 MMOL/L (ref 23–29)
CODING SYSTEM: NORMAL
CREAT SERPL-MCNC: 0.6 MG/DL (ref 0.5–1.4)
CROSSMATCH INTERPRETATION: NORMAL
DIFFERENTIAL METHOD BLD: ABNORMAL
DISPENSE STATUS: NORMAL
EOSINOPHIL # BLD AUTO: 0 K/UL (ref 0–0.5)
EOSINOPHIL NFR BLD: 0 % (ref 0–8)
ERYTHROCYTE [DISTWIDTH] IN BLOOD BY AUTOMATED COUNT: 13.2 % (ref 11.5–14.5)
EST. GFR  (NO RACE VARIABLE): >60 ML/MIN/1.73 M^2
GLUCOSE SERPL-MCNC: 115 MG/DL (ref 70–110)
HCT VFR BLD AUTO: 19.2 % (ref 40–54)
HGB BLD-MCNC: 6.6 G/DL (ref 14–18)
IMM GRANULOCYTES # BLD AUTO: 0 K/UL (ref 0–0.04)
IMM GRANULOCYTES NFR BLD AUTO: 0 % (ref 0–0.5)
LYMPHOCYTES # BLD AUTO: 0 K/UL (ref 1–4.8)
LYMPHOCYTES NFR BLD: 0 % (ref 18–48)
MAGNESIUM SERPL-MCNC: 1.6 MG/DL (ref 1.6–2.6)
MCH RBC QN AUTO: 30 PG (ref 27–31)
MCHC RBC AUTO-ENTMCNC: 34.4 G/DL (ref 32–36)
MCV RBC AUTO: 87 FL (ref 82–98)
MONOCYTES # BLD AUTO: 0 K/UL (ref 0.3–1)
MONOCYTES NFR BLD: 0 % (ref 4–15)
NEUTROPHILS # BLD AUTO: 0 K/UL (ref 1.8–7.7)
NEUTROPHILS NFR BLD: 100 % (ref 38–73)
NRBC BLD-RTO: 0 /100 WBC
NUM UNITS TRANS PACKED RBC: NORMAL
PHOSPHATE SERPL-MCNC: 2.3 MG/DL (ref 2.7–4.5)
PLATELET # BLD AUTO: 15 K/UL (ref 150–450)
PLATELET BLD QL SMEAR: ABNORMAL
PMV BLD AUTO: 12 FL (ref 9.2–12.9)
POTASSIUM SERPL-SCNC: 3.4 MMOL/L (ref 3.5–5.1)
PROT SERPL-MCNC: 5.5 G/DL (ref 6–8.4)
RBC # BLD AUTO: 2.2 M/UL (ref 4.6–6.2)
SODIUM SERPL-SCNC: 144 MMOL/L (ref 136–145)
WBC # BLD AUTO: 0.01 K/UL (ref 3.9–12.7)

## 2024-09-28 PROCEDURE — 20600001 HC STEP DOWN PRIVATE ROOM

## 2024-09-28 PROCEDURE — 86920 COMPATIBILITY TEST SPIN: CPT | Performed by: INTERNAL MEDICINE

## 2024-09-28 PROCEDURE — P9040 RBC LEUKOREDUCED IRRADIATED: HCPCS | Performed by: INTERNAL MEDICINE

## 2024-09-28 PROCEDURE — 84100 ASSAY OF PHOSPHORUS: CPT | Performed by: NURSE PRACTITIONER

## 2024-09-28 PROCEDURE — 63600175 PHARM REV CODE 636 W HCPCS: Performed by: NURSE PRACTITIONER

## 2024-09-28 PROCEDURE — 25000003 PHARM REV CODE 250: Performed by: NURSE PRACTITIONER

## 2024-09-28 PROCEDURE — 85025 COMPLETE CBC W/AUTO DIFF WBC: CPT | Performed by: NURSE PRACTITIONER

## 2024-09-28 PROCEDURE — 25000003 PHARM REV CODE 250: Performed by: INTERNAL MEDICINE

## 2024-09-28 PROCEDURE — 63600175 PHARM REV CODE 636 W HCPCS: Performed by: INTERNAL MEDICINE

## 2024-09-28 PROCEDURE — 36430 TRANSFUSION BLD/BLD COMPNT: CPT

## 2024-09-28 PROCEDURE — 80053 COMPREHEN METABOLIC PANEL: CPT | Performed by: NURSE PRACTITIONER

## 2024-09-28 PROCEDURE — 83735 ASSAY OF MAGNESIUM: CPT | Performed by: NURSE PRACTITIONER

## 2024-09-28 RX ORDER — TRAMADOL HYDROCHLORIDE 50 MG/1
50 TABLET ORAL EVERY 6 HOURS PRN
Status: DISCONTINUED | OUTPATIENT
Start: 2024-09-28 | End: 2024-09-30

## 2024-09-28 RX ORDER — HYDROCODONE BITARTRATE AND ACETAMINOPHEN 500; 5 MG/1; MG/1
TABLET ORAL
Status: DISCONTINUED | OUTPATIENT
Start: 2024-09-28 | End: 2024-10-03

## 2024-09-28 RX ADMIN — TRAMADOL HYDROCHLORIDE 50 MG: 50 TABLET, COATED ORAL at 04:09

## 2024-09-28 RX ADMIN — LOPERAMIDE HYDROCHLORIDE 2 MG: 2 CAPSULE ORAL at 08:09

## 2024-09-28 RX ADMIN — DIPHENHYDRAMINE HYDROCHLORIDE 50 MG: 25 CAPSULE ORAL at 10:09

## 2024-09-28 RX ADMIN — MYCOPHENOLATE MOFETIL 1000 MG: 250 CAPSULE ORAL at 08:09

## 2024-09-28 RX ADMIN — Medication 1 DOSE: at 06:09

## 2024-09-28 RX ADMIN — LIDOCAINE 5% 1 PATCH: 700 PATCH TOPICAL at 10:09

## 2024-09-28 RX ADMIN — Medication 1 TABLET: at 06:09

## 2024-09-28 RX ADMIN — Medication 6 MG: at 08:09

## 2024-09-28 RX ADMIN — HYDROCORTISONE: 25 CREAM TOPICAL at 08:09

## 2024-09-28 RX ADMIN — GABAPENTIN 600 MG: 300 CAPSULE ORAL at 08:09

## 2024-09-28 RX ADMIN — SODIUM CHLORIDE AND POTASSIUM CHLORIDE 75 ML/HR: .9; .15 SOLUTION INTRAVENOUS at 12:09

## 2024-09-28 RX ADMIN — TACROLIMUS 1 MG: 1 CAPSULE ORAL at 06:09

## 2024-09-28 RX ADMIN — POTASSIUM CHLORIDE 20 MEQ: 1500 TABLET, EXTENDED RELEASE ORAL at 08:09

## 2024-09-28 RX ADMIN — Medication 400 MG: at 06:09

## 2024-09-28 RX ADMIN — TAMSULOSIN HYDROCHLORIDE 0.4 MG: 0.4 CAPSULE ORAL at 08:09

## 2024-09-28 RX ADMIN — Medication 1 DOSE: at 01:09

## 2024-09-28 RX ADMIN — Medication: at 08:09

## 2024-09-28 RX ADMIN — VANCOMYCIN HYDROCHLORIDE 125 MG: KIT at 08:09

## 2024-09-28 RX ADMIN — LEVOFLOXACIN 500 MG: 500 TABLET, FILM COATED ORAL at 08:09

## 2024-09-28 RX ADMIN — ONDANSETRON 8 MG: 2 INJECTION INTRAMUSCULAR; INTRAVENOUS at 01:09

## 2024-09-28 RX ADMIN — Medication 1 DOSE: at 08:09

## 2024-09-28 RX ADMIN — URSODIOL 300 MG: 300 CAPSULE ORAL at 08:09

## 2024-09-28 RX ADMIN — ACYCLOVIR 800 MG: 200 CAPSULE ORAL at 08:09

## 2024-09-28 RX ADMIN — POTASSIUM CHLORIDE 20 MEQ: 1500 TABLET, EXTENDED RELEASE ORAL at 06:09

## 2024-09-28 RX ADMIN — LETERMOVIR 480 MG: 480 TABLET, FILM COATED ORAL at 08:09

## 2024-09-28 RX ADMIN — MYCOPHENOLATE MOFETIL 1000 MG: 250 CAPSULE ORAL at 01:09

## 2024-09-28 RX ADMIN — Medication 1 TABLET: at 10:09

## 2024-09-28 RX ADMIN — HYDROCORTISONE: 25 CREAM TOPICAL at 09:09

## 2024-09-28 RX ADMIN — ONDANSETRON 8 MG: 2 INJECTION INTRAMUSCULAR; INTRAVENOUS at 06:09

## 2024-09-28 RX ADMIN — LOPERAMIDE HYDROCHLORIDE 2 MG: 2 CAPSULE ORAL at 01:09

## 2024-09-28 RX ADMIN — Medication 1 TABLET: at 02:09

## 2024-09-28 RX ADMIN — PANTOPRAZOLE SODIUM 40 MG: 40 TABLET, DELAYED RELEASE ORAL at 08:09

## 2024-09-28 RX ADMIN — LOPERAMIDE HYDROCHLORIDE 2 MG: 2 CAPSULE ORAL at 06:09

## 2024-09-28 RX ADMIN — ONDANSETRON 8 MG: 2 INJECTION INTRAMUSCULAR; INTRAVENOUS at 10:09

## 2024-09-28 RX ADMIN — Medication 400 MG: at 04:09

## 2024-09-28 RX ADMIN — POSACONAZOLE 300 MG: 100 TABLET, DELAYED RELEASE ORAL at 08:09

## 2024-09-28 RX ADMIN — TRAZODONE HYDROCHLORIDE 50 MG: 50 TABLET ORAL at 08:09

## 2024-09-28 RX ADMIN — FILGRASTIM 300 MCG: 300 INJECTION, SOLUTION INTRAVENOUS; SUBCUTANEOUS at 08:09

## 2024-09-28 RX ADMIN — PROCHLORPERAZINE EDISYLATE 10 MG: 5 INJECTION INTRAMUSCULAR; INTRAVENOUS at 08:09

## 2024-09-28 RX ADMIN — TACROLIMUS 1 MG: 1 CAPSULE ORAL at 08:09

## 2024-09-28 RX ADMIN — Medication 400 MG: at 10:09

## 2024-09-28 NOTE — ASSESSMENT & PLAN NOTE
- Patient of Dr. 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 Donor is A+.  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 Patient will start letermovir ppx on 9/24/24.  - Received fresh BMT product on 9/19/24 with a CD34 dose of 3.55 x10^6.  - Of note, cilostazol may interact with tacro. (Currently on hold for plts < 50K).  - See treatment plan below:    Planned conditioning regimen:  Fludarabine on Day -5, -4, -3, and -2  Melphalan on Day -2  TBI on Day -1     Planned  GVHD Prophylaxis:  Cyclophosphamide (with Mesna) on Days +3 and +4  Mycophenolate starting on Day +5  Tacrolimus starting on Day +5     Antimicrobial Prophylaxis:  Acyclovir starting on Day -5  Levofloxacin starting on Day -1  Micafungin on Day -1 through Day +4  Letermovir starting on Day +5 (if CMV PCR drawn on day 0 results negative)  Posaconazole starting on Day +5  Bactrim starting on Day +30     SOS/VOD Prophylaxis:  Ursodiol on Day -5 through Day +30      Growth Factor Support:  Neupogen starting on Day +5     Caregiver: wife   Post-transplant discharge plans: home

## 2024-09-28 NOTE — PLAN OF CARE
Pt is Day +9 of a Haplo SCT.  Pt received rbcs, electrolytes.  POC reviewed with patient; understanding verbalized. Pt. with nonskid footwear on, bed in lowest position, and locked with bed rails up x2.  Pt. instructed to call prior to getting OOB.  Pt. has call light and personal items within reach. Patient ambulates in room with stand by assist. VSS and afebrile this shift. All questions and concerns addressed at this time. Family is attentive at bedside.  Will continue to monitor.

## 2024-09-28 NOTE — PROGRESS NOTES
[facility] - Oncology ([facility])  Hematology  Bone Marrow Transplant  Progress Note    Patient Name: Guillaume Salinas  Admission Date: 9/13/2024  Hospital Length of Stay: 15 days  Code Status: Full Code    Subjective:     Interval History: Day +9 /TBI PtCy haploidentical stem cell transplant for MDS. Nausea is minimal this morning. Reports diarrhea stable, about 9 movements in 24 hours. Remains on scheduled immodium with prn lomotil. Shoulder pain better controlled today. Reports hemorrhoids remain significant, using hydrocortisone procto cream.    Objective:     Vital Signs (Most Recent):  Temp: 97.7 °F (36.5 °C) (09/28/24 1132)  Pulse: 89 (09/28/24 1132)  Resp: 16 (09/28/24 1132)  BP: (!) 158/81 (09/28/24 1132)  SpO2: 97 % (09/28/24 1132) Vital Signs (24h Range):  Temp:  [97.4 °F (36.3 °C)-98.2 °F (36.8 °C)] 97.7 °F (36.5 °C)  Pulse:  [] 89  Resp:  [16-18] 16  SpO2:  [91 %-98 %] 97 %  BP: (126-158)/(66-87) 158/81     Weight: 74.6 kg (164 lb 5.7 oz)  Body mass index is 24.27 kg/m².  Body surface area is 1.91 meters squared.    ECOG SCORE           ECOG PS 1    Intake/Output - Last 3 Shifts         09/26 0700 09/27 0659 09/27 0700 09/28 0659 09/28 0700  09/29 0659    P.O. 300 1040     I.V. (mL/kg) 897.1 (12.1) 1692.5 (22.7)     IV Piggyback       Total Intake(mL/kg) 1197.1 (16.2) 2732.5 (36.7)     Urine (mL/kg/hr) 950 (0.5) 800 (0.4)     Emesis/NG output  200     Stool 0 0     Total Output 950 1000     Net +247.1 +1732.5            Urine Occurrence  5 x     Stool Occurrence 7 x 9 x              Physical Exam  Vitals and nursing note reviewed.   Constitutional:       Appearance: He is well-developed.   HENT:      Head: Normocephalic and atraumatic.   Eyes:      General: No scleral icterus.     Conjunctiva/sclera: Conjunctivae normal.   Cardiovascular:      Rate and Rhythm: Normal rate.      Arteriovenous access: Right arteriovenous access is present.     Comments: Right chest wall vascular  catheter  Pulmonary:      Effort: Pulmonary effort is normal. No respiratory distress.   Abdominal:      General: There is no distension.      Palpations: Abdomen is soft.      Tenderness: There is no abdominal tenderness.   Musculoskeletal:         General: Normal range of motion.      Cervical back: Normal range of motion and neck supple.   Skin:     General: Skin is warm and dry.   Neurological:      Mental Status: He is alert and oriented to person, place, and time.      Cranial Nerves: No cranial nerve deficit.   Psychiatric:         Behavior: Behavior normal.            Significant Labs:   CBC:   Recent Labs   Lab 09/27/24  0400 09/28/24  0418   WBC 0.01* 0.01*   HGB 7.2* 6.6*   HCT 21.1* 19.2*   PLT 30* 15*    and CMP:   Recent Labs   Lab 09/27/24  0400 09/28/24 0418    144   K 3.5 3.4*   * 113*   CO2 22* 24    115*   BUN 8 8   CREATININE 0.6 0.6   CALCIUM 8.3* 8.4*   PROT 5.5* 5.5*   ALBUMIN 2.8* 2.9*   BILITOT 0.5 0.5   ALKPHOS 54* 53*   AST 15 17   ALT 12 13   ANIONGAP 9 7*       Diagnostic Results:  I have reviewed all pertinent imaging results/findings within the past 24 hours.  Assessment/Plan:     * S/P allogeneic bone marrow transplant  - Patient of Dr. 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 Donor is A+.  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 Patient will start letermovir ppx on 9/24/24.  - Received fresh BMT product on 9/19/24 with a CD34 dose of 3.55 x10^6.  - Of note, cilostazol may interact with tacro. (Currently on hold for plts < 50K).  - See treatment plan below:    Planned conditioning regimen:  Fludarabine on Day -5, -4, -3, and -2  Melphalan on Day -2  TBI on Day -1     Planned  GVHD Prophylaxis:  Cyclophosphamide (with [place]na) on Days +3 and  +4  Mycophenolate starting on Day +5  Tacrolimus starting on Day +5     Antimicrobial Prophylaxis:  Acyclovir starting on Day -5  Levofloxacin starting on Day -1  Micafungin on Day -1 through Day +4  Letermovir starting on Day +5 (if CMV PCR drawn on day 0 results negative)  Posaconazole starting on Day +5  Bactrim starting on Day +30     SOS/VOD Prophylaxis:  Ursodiol on Day -5 through Day +30      Growth Factor Support:  Neupogen starting on Day +5     Caregiver: wife   Post-transplant discharge plans: home        Urinary frequency  - reports increased nightly frequency and urgency with low output  - no formal BPH diagnosis; will start flomax and monitor for improvement    Chemotherapy-induced nausea  - scheduled zofran IV 8mg q8h on 9/27  - has prn compazine and ativan    Hemorrhoids  - d/t chemo induced diarrhea  - has anusol, tucks pads, and LETS gel  - patient denies any bleeding at site    Headache  - C/o headache 9/25  - Daily coffee drinker. Improved after drinking coffee.  - Suspect caffeine withdrawal.  - Can start caffeine tablet if patient is unable to drink coffee due to chemo side effects  - mild HA today, improved following caffeine intake    Chemotherapy induced diarrhea  - C-diff neg 9/24  - Of note, was treated empirically for c-diff with oral vanc prior to admission and is receiving oral vanc ppx while admitted.  - persists so imodium now scheduled ATC and added prn lomotil (9/26)    Neutropenic fever  - First fever with tmax 101.8F on 9/23  - Infection w/u unremarkable thus far  - No fevers since 9/23, Cefepime stopped on 9/25 and back on oral ppx LVQ.  RESOLVED    Insomnia  - has PRN Trazodone however not currently using  - started on scheduled melatonin    History of Clostridioides difficile infection  - Was treated empirically for c-diff with oral vanc prior to admission.  - Continue oral vanc ppx per transplant protocol      Neuropathy  - Continue home gabapentin    Pancytopenia due to  antineoplastic chemotherapy  - Daily CBC while inpatient  - Transfuse for hgb <7 Plt  or <10K  - Continue antimicrobial ppx  - Holding cilostazol for plts < 50K    Myelodysplastic syndrome  From Dr Hickey's clinic note 8/5:  Stem cell transplant candidate: We discussed the role for allogeneic stem cell transplantation in this disease process as a potentially curative option. We had an extensive discussion about the rationale, logistics, risks, and benefits. We reviewed the requirement to stay in the [place] area for 100 days with a caregiver at all times. We discussed the risks, including infection, graft failure, organ toxicity, graft versus host disease, relapse of disease, and secondary cancers. We reviewed the need for long-term immunosuppression and need for close monitoring. HCT-CI of 1 (intermediate risk). We had a prolonged discussion today regarding his recent deconditioning, Cdiff, and underlying disease. He is now much improved since last seen, and is improving his strength since starting to work with PT. Diarrhea now controlled. We discussed that our plan to move forward with the transplant process.   Coordinator: Fay Stephens  Regimen:  + 2Gy TBI  Donor: son (haplo)   Graft source: BM  - Admitted 9/13/24 for a  TBI PT Cy haplo (son) allogeneic BMT    Hypertension, essential  - Holding home Coreg for fluid responsive hypotension. Resume as indicated.    Coronary artery disease involving native coronary artery of native heart without angina pectoris  - Holding home Coreg for hypotension. Can resume as indicated.        VTE Risk Mitigation (From admission, onward)           Ordered     heparin, porcine (PF) 100 unit/mL injection flush 300 Units  As needed (PRN)         09/13/24 7745                    Disposition: inpatient for transplant    Erika Lares MD  Bone Marrow Transplant  [facility] - Oncology ([facility])

## 2024-09-28 NOTE — PLAN OF CARE
Problem: Adult Inpatient Plan of Care  Goal: Plan of Care Review  Outcome: Progressing     Problem: Adult Inpatient Plan of Care  Goal: Optimal Comfort and Wellbeing  Outcome: Progressing   VSS. Complains of pain in shoulders, Tramadol given. Complains of nausea, PRN compazine given before taking medication. Patient had 3-4 small watery bowel movements on shift. Na/20K infusing. No acute changes overnight. Bed alarm refused and family at bedside.

## 2024-09-28 NOTE — SUBJECTIVE & OBJECTIVE
Subjective:     Interval History: Day +9 /TBI PtCy haploidentical stem cell transplant for MDS. Nausea is minimal this morning. Reports diarrhea stable, about 9 movements in 24 hours. Remains on scheduled immodium with prn lomotil. Shoulder pain better controlled today. Reports hemorrhoids remain significant, using hydrocortisone procto cream.    Objective:     Vital Signs (Most Recent):  Temp: 97.7 °F (36.5 °C) (09/28/24 1132)  Pulse: 89 (09/28/24 1132)  Resp: 16 (09/28/24 1132)  BP: (!) 158/81 (09/28/24 1132)  SpO2: 97 % (09/28/24 1132) Vital Signs (24h Range):  Temp:  [97.4 °F (36.3 °C)-98.2 °F (36.8 °C)] 97.7 °F (36.5 °C)  Pulse:  [] 89  Resp:  [16-18] 16  SpO2:  [91 %-98 %] 97 %  BP: (126-158)/(66-87) 158/81     Weight: 74.6 kg (164 lb 5.7 oz)  Body mass index is 24.27 kg/m².  Body surface area is 1.91 meters squared.    ECOG SCORE           ECOG PS 1    Intake/Output - Last 3 Shifts         09/26 0700 09/27 0659 09/27 0700 09/28 0659 09/28 0700  09/29 0659    P.O. 300 1040     I.V. (mL/kg) 897.1 (12.1) 1692.5 (22.7)     IV Piggyback       Total Intake(mL/kg) 1197.1 (16.2) 2732.5 (36.7)     Urine (mL/kg/hr) 950 (0.5) 800 (0.4)     Emesis/NG output  200     Stool 0 0     Total Output 950 1000     Net +247.1 +1732.5            Urine Occurrence  5 x     Stool Occurrence 7 x 9 x              Physical Exam  Vitals and nursing note reviewed.   Constitutional:       Appearance: He is well-developed.   HENT:      Head: Normocephalic and atraumatic.   Eyes:      General: No scleral icterus.     Conjunctiva/sclera: Conjunctivae normal.   Cardiovascular:      Rate and Rhythm: Normal rate.      Arteriovenous access: Right arteriovenous access is present.     Comments: Right chest wall vascular catheter  Pulmonary:      Effort: Pulmonary effort is normal. No respiratory distress.   Abdominal:      General: There is no distension.      Palpations: Abdomen is soft.      Tenderness: There is no abdominal  tenderness.   Musculoskeletal:         General: Normal range of motion.      Cervical back: Normal range of motion and neck supple.   Skin:     General: Skin is warm and dry.   Neurological:      Mental Status: He is alert and oriented to person, place, and time.      Cranial Nerves: No cranial nerve deficit.   Psychiatric:         Behavior: Behavior normal.            Significant Labs:   CBC:   Recent Labs   Lab 09/27/24  0400 09/28/24  0418   WBC 0.01* 0.01*   HGB 7.2* 6.6*   HCT 21.1* 19.2*   PLT 30* 15*    and CMP:   Recent Labs   Lab 09/27/24  0400 09/28/24  0418    144   K 3.5 3.4*   * 113*   CO2 22* 24    115*   BUN 8 8   CREATININE 0.6 0.6   CALCIUM 8.3* 8.4*   PROT 5.5* 5.5*   ALBUMIN 2.8* 2.9*   BILITOT 0.5 0.5   ALKPHOS 54* 53*   AST 15 17   ALT 12 13   ANIONGAP 9 7*       Diagnostic Results:  I have reviewed all pertinent imaging results/findings within the past 24 hours.

## 2024-09-29 PROBLEM — R68.2 DRY MOUTH: Status: ACTIVE | Noted: 2024-09-29

## 2024-09-29 LAB
ALBUMIN SERPL BCP-MCNC: 3 G/DL (ref 3.5–5.2)
ALP SERPL-CCNC: 62 U/L (ref 55–135)
ALT SERPL W/O P-5'-P-CCNC: 15 U/L (ref 10–44)
ANION GAP SERPL CALC-SCNC: 9 MMOL/L (ref 8–16)
AST SERPL-CCNC: 20 U/L (ref 10–40)
BASOPHILS # BLD AUTO: 0 K/UL (ref 0–0.2)
BASOPHILS NFR BLD: 0 % (ref 0–1.9)
BILIRUB SERPL-MCNC: 0.8 MG/DL (ref 0.1–1)
BLD PROD TYP BPU: NORMAL
BLOOD UNIT EXPIRATION DATE: NORMAL
BLOOD UNIT TYPE CODE: 6200
BLOOD UNIT TYPE: NORMAL
BUN SERPL-MCNC: 8 MG/DL (ref 8–23)
CALCIUM SERPL-MCNC: 8.4 MG/DL (ref 8.7–10.5)
CHLORIDE SERPL-SCNC: 111 MMOL/L (ref 95–110)
CO2 SERPL-SCNC: 21 MMOL/L (ref 23–29)
CODING SYSTEM: NORMAL
CREAT SERPL-MCNC: 0.7 MG/DL (ref 0.5–1.4)
CROSSMATCH INTERPRETATION: NORMAL
DIFFERENTIAL METHOD BLD: ABNORMAL
DISPENSE STATUS: NORMAL
EOSINOPHIL # BLD AUTO: 0 K/UL (ref 0–0.5)
EOSINOPHIL NFR BLD: 0 % (ref 0–8)
ERYTHROCYTE [DISTWIDTH] IN BLOOD BY AUTOMATED COUNT: 12.8 % (ref 11.5–14.5)
EST. GFR  (NO RACE VARIABLE): >60 ML/MIN/1.73 M^2
GLUCOSE SERPL-MCNC: 126 MG/DL (ref 70–110)
HCT VFR BLD AUTO: 22.9 % (ref 40–54)
HGB BLD-MCNC: 8 G/DL (ref 14–18)
IMM GRANULOCYTES # BLD AUTO: 0 K/UL (ref 0–0.04)
IMM GRANULOCYTES NFR BLD AUTO: 0 % (ref 0–0.5)
LYMPHOCYTES # BLD AUTO: 0 K/UL (ref 1–4.8)
LYMPHOCYTES NFR BLD: 0 % (ref 18–48)
MAGNESIUM SERPL-MCNC: 1.6 MG/DL (ref 1.6–2.6)
MCH RBC QN AUTO: 30.7 PG (ref 27–31)
MCHC RBC AUTO-ENTMCNC: 34.9 G/DL (ref 32–36)
MCV RBC AUTO: 88 FL (ref 82–98)
MONOCYTES # BLD AUTO: 0 K/UL (ref 0.3–1)
MONOCYTES NFR BLD: 0 % (ref 4–15)
NEUTROPHILS # BLD AUTO: 0 K/UL (ref 1.8–7.7)
NEUTROPHILS NFR BLD: 100 % (ref 38–73)
NRBC BLD-RTO: 0 /100 WBC
PHOSPHATE SERPL-MCNC: 2.8 MG/DL (ref 2.7–4.5)
PLATELET # BLD AUTO: 8 K/UL (ref 150–450)
PLATELET BLD QL SMEAR: ABNORMAL
PMV BLD AUTO: 9.6 FL (ref 9.2–12.9)
POTASSIUM SERPL-SCNC: 3.7 MMOL/L (ref 3.5–5.1)
PROT SERPL-MCNC: 5.7 G/DL (ref 6–8.4)
RBC # BLD AUTO: 2.61 M/UL (ref 4.6–6.2)
SODIUM SERPL-SCNC: 141 MMOL/L (ref 136–145)
UNIT NUMBER: NORMAL
WBC # BLD AUTO: 0.01 K/UL (ref 3.9–12.7)

## 2024-09-29 PROCEDURE — 25000003 PHARM REV CODE 250: Performed by: NURSE PRACTITIONER

## 2024-09-29 PROCEDURE — 83735 ASSAY OF MAGNESIUM: CPT | Performed by: NURSE PRACTITIONER

## 2024-09-29 PROCEDURE — 20600001 HC STEP DOWN PRIVATE ROOM

## 2024-09-29 PROCEDURE — 80053 COMPREHEN METABOLIC PANEL: CPT | Performed by: NURSE PRACTITIONER

## 2024-09-29 PROCEDURE — 63600175 PHARM REV CODE 636 W HCPCS: Performed by: NURSE PRACTITIONER

## 2024-09-29 PROCEDURE — 25000003 PHARM REV CODE 250: Performed by: INTERNAL MEDICINE

## 2024-09-29 PROCEDURE — 85025 COMPLETE CBC W/AUTO DIFF WBC: CPT | Performed by: NURSE PRACTITIONER

## 2024-09-29 PROCEDURE — 84100 ASSAY OF PHOSPHORUS: CPT | Performed by: NURSE PRACTITIONER

## 2024-09-29 PROCEDURE — 63600175 PHARM REV CODE 636 W HCPCS: Performed by: INTERNAL MEDICINE

## 2024-09-29 PROCEDURE — P9037 PLATE PHERES LEUKOREDU IRRAD: HCPCS | Performed by: INTERNAL MEDICINE

## 2024-09-29 PROCEDURE — 36430 TRANSFUSION BLD/BLD COMPNT: CPT

## 2024-09-29 RX ORDER — HYDROCODONE BITARTRATE AND ACETAMINOPHEN 500; 5 MG/1; MG/1
TABLET ORAL
Status: DISCONTINUED | OUTPATIENT
Start: 2024-09-29 | End: 2024-10-03

## 2024-09-29 RX ADMIN — GABAPENTIN 600 MG: 300 CAPSULE ORAL at 08:09

## 2024-09-29 RX ADMIN — Medication 1 DOSE: at 08:09

## 2024-09-29 RX ADMIN — Medication 6 MG: at 08:09

## 2024-09-29 RX ADMIN — MYCOPHENOLATE MOFETIL 1000 MG: 250 CAPSULE ORAL at 08:09

## 2024-09-29 RX ADMIN — ACYCLOVIR 800 MG: 200 CAPSULE ORAL at 08:09

## 2024-09-29 RX ADMIN — FILGRASTIM 300 MCG: 300 INJECTION, SOLUTION INTRAVENOUS; SUBCUTANEOUS at 08:09

## 2024-09-29 RX ADMIN — SODIUM CHLORIDE AND POTASSIUM CHLORIDE 75 ML/HR: .9; .15 SOLUTION INTRAVENOUS at 02:09

## 2024-09-29 RX ADMIN — POSACONAZOLE 300 MG: 100 TABLET, DELAYED RELEASE ORAL at 08:09

## 2024-09-29 RX ADMIN — VANCOMYCIN HYDROCHLORIDE 125 MG: KIT at 08:09

## 2024-09-29 RX ADMIN — LOPERAMIDE HYDROCHLORIDE 2 MG: 2 CAPSULE ORAL at 08:09

## 2024-09-29 RX ADMIN — MYCOPHENOLATE MOFETIL 1000 MG: 250 CAPSULE ORAL at 02:09

## 2024-09-29 RX ADMIN — TACROLIMUS 1 MG: 1 CAPSULE ORAL at 05:09

## 2024-09-29 RX ADMIN — LOPERAMIDE HYDROCHLORIDE 2 MG: 2 CAPSULE ORAL at 05:09

## 2024-09-29 RX ADMIN — ONDANSETRON 8 MG: 2 INJECTION INTRAMUSCULAR; INTRAVENOUS at 06:09

## 2024-09-29 RX ADMIN — Medication 400 MG: at 10:09

## 2024-09-29 RX ADMIN — POTASSIUM CHLORIDE 20 MEQ: 1500 TABLET, EXTENDED RELEASE ORAL at 06:09

## 2024-09-29 RX ADMIN — Medication: at 08:09

## 2024-09-29 RX ADMIN — HYDROCORTISONE: 25 CREAM TOPICAL at 09:09

## 2024-09-29 RX ADMIN — PANTOPRAZOLE SODIUM 40 MG: 40 TABLET, DELAYED RELEASE ORAL at 08:09

## 2024-09-29 RX ADMIN — TAMSULOSIN HYDROCHLORIDE 0.4 MG: 0.4 CAPSULE ORAL at 08:09

## 2024-09-29 RX ADMIN — URSODIOL 300 MG: 300 CAPSULE ORAL at 08:09

## 2024-09-29 RX ADMIN — Medication 400 MG: at 06:09

## 2024-09-29 RX ADMIN — TACROLIMUS 1 MG: 1 CAPSULE ORAL at 08:09

## 2024-09-29 RX ADMIN — LOPERAMIDE HYDROCHLORIDE 2 MG: 2 CAPSULE ORAL at 02:09

## 2024-09-29 RX ADMIN — PROCHLORPERAZINE EDISYLATE 10 MG: 5 INJECTION INTRAMUSCULAR; INTRAVENOUS at 06:09

## 2024-09-29 RX ADMIN — ONDANSETRON 8 MG: 2 INJECTION INTRAMUSCULAR; INTRAVENOUS at 10:09

## 2024-09-29 RX ADMIN — ONDANSETRON 8 MG: 2 INJECTION INTRAMUSCULAR; INTRAVENOUS at 02:09

## 2024-09-29 RX ADMIN — Medication 1 DOSE: at 01:09

## 2024-09-29 RX ADMIN — TRAZODONE HYDROCHLORIDE 50 MG: 50 TABLET ORAL at 08:09

## 2024-09-29 RX ADMIN — DIPHENHYDRAMINE HYDROCHLORIDE 50 MG: 25 CAPSULE ORAL at 10:09

## 2024-09-29 RX ADMIN — LEVOFLOXACIN 500 MG: 500 TABLET, FILM COATED ORAL at 08:09

## 2024-09-29 RX ADMIN — LIDOCAINE 5% 1 PATCH: 700 PATCH TOPICAL at 10:09

## 2024-09-29 RX ADMIN — LETERMOVIR 480 MG: 480 TABLET, FILM COATED ORAL at 08:09

## 2024-09-29 NOTE — PLAN OF CARE
Pt is Day +10 of a Haplo SCT.  Pt received plts, electrolytes.  POC reviewed with patient; understanding verbalized. Pt. with nonskid footwear on, bed in lowest position, and locked with bed rails up x2.  Pt. instructed to call prior to getting OOB.  Pt. has call light and personal items within reach. Patient ambulates in room with stand by assist. VSS and afebrile this shift. All questions and concerns addressed at this time. Family is attentive at bedside.  Will continue to monitor.

## 2024-09-29 NOTE — ASSESSMENT & PLAN NOTE
- continue to moisten lips with chapstick  - encouraged use of oral hygiene rinses to sterilize mouth and stimulate saliva production

## 2024-09-29 NOTE — PLAN OF CARE
Problem: Adult Inpatient Plan of Care  Goal: Plan of Care Review  Outcome: Progressing     Problem: Adult Inpatient Plan of Care  Goal: Optimal Comfort and Wellbeing  Outcome: Progressing   VSS. Denies any pain. Afebrile. Complained of nausea, PRN compazine given. Fluids infusing. No acute changes overnight. Bed alarm refused and call light within reach. Family at bedside.

## 2024-09-29 NOTE — ASSESSMENT & PLAN NOTE
- Patient of Dr. 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 Donor is A+.  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 Patient will start letermovir ppx on 9/24/24.  - Received fresh BMT product on 9/19/24 with a CD34 dose of 3.55 x10^6.  - Of note, cilostazol may interact with tacro. (Currently on hold for plts < 50K).  - See treatment plan below:    Planned conditioning regimen:  Fludarabine on Day -5, -4, -3, and -2  Melphalan on Day -2  TBI on Day -1     Planned  GVHD Prophylaxis:  Cyclophosphamide (with Mesna) on Days +3 and +4  Mycophenolate starting on Day +5  Tacrolimus starting on Day +5     Antimicrobial Prophylaxis:  Acyclovir starting on Day -5  Levofloxacin starting on Day -1  Micafungin on Day -1 through Day +4  Letermovir starting on Day +5 (if CMV PCR drawn on day 0 results negative)  Posaconazole starting on Day +5  Bactrim starting on Day +30     SOS/VOD Prophylaxis:  Ursodiol on Day -5 through Day +30      Growth Factor Support:  Neupogen starting on Day +5     Caregiver: wife   Post-transplant discharge plans: home

## 2024-09-29 NOTE — SUBJECTIVE & OBJECTIVE
Subjective:     Interval History: Day +10 /TBI PtCy haploidentical stem cell transplant for MDS. Nausea is minimal. Shoulder pain much better, controlled on current therapy. Diarrhea stable- about 9 BM daily, small volume. Hemorrhoid pain significant, but the hydrocortisone cream does provide relief. Reports now dry mouth, blood on lips from dryness/cracking. Using chapstick. Has not been consistent with oral hygiene rinses/regimen- discussed and will do better.    Objective:     Vital Signs (Most Recent):  Temp: 98.2 °F (36.8 °C) (09/29/24 1130)  Pulse: 102 (09/29/24 1130)  Resp: 18 (09/29/24 1130)  BP: (!) 144/75 (09/29/24 1130)  SpO2: (!) 93 % (09/29/24 1130) Vital Signs (24h Range):  Temp:  [97.4 °F (36.3 °C)-98.6 °F (37 °C)] 98.2 °F (36.8 °C)  Pulse:  [] 102  Resp:  [16-18] 18  SpO2:  [88 %-98 %] 93 %  BP: (135-160)/(74-84) 144/75     Weight: 74.5 kg (164 lb 3.9 oz)  Body mass index is 24.25 kg/m².  Body surface area is 1.9 meters squared.    ECOG SCORE           ECOG PS    Intake/Output - Last 3 Shifts         09/27 0700  09/28 0659 09/28 0700  09/29 0659 09/29 0700 09/30 0659    P.O. 1040 300     I.V. (mL/kg) 1692.5 (22.7) 1300 (17.4)     Total Intake(mL/kg) 2732.5 (36.7) 1600 (21.5)     Urine (mL/kg/hr) 800 (0.4)      Emesis/NG output 200      Stool 0      Total Output 1000      Net +1732.5 +1600            Urine Occurrence 5 x 3 x     Stool Occurrence 9 x 4 x              Physical Exam  Vitals and nursing note reviewed.   Constitutional:       Appearance: He is well-developed.   HENT:      Head: Normocephalic and atraumatic.      Mouth/Throat:      Mouth: Mucous membranes are dry.     Eyes:      General: No scleral icterus.     Conjunctiva/sclera: Conjunctivae normal.   Cardiovascular:      Rate and Rhythm: Normal rate.      Arteriovenous access: Right arteriovenous access is present.     Comments: Right chest wall vascular catheter  Pulmonary:      Effort: Pulmonary effort is normal. No  respiratory distress.   Abdominal:      General: There is no distension.      Palpations: Abdomen is soft.      Tenderness: There is no abdominal tenderness.   Musculoskeletal:         General: Normal range of motion.      Cervical back: Normal range of motion and neck supple.   Skin:     General: Skin is warm and dry.   Neurological:      Mental Status: He is alert and oriented to person, place, and time.      Cranial Nerves: No cranial nerve deficit.   Psychiatric:         Behavior: Behavior normal.            Significant Labs:   CBC:   Recent Labs   Lab 09/28/24 0418 09/29/24  0436   WBC 0.01* 0.01*   HGB 6.6* 8.0*   HCT 19.2* 22.9*   PLT 15* 8*    and CMP:   Recent Labs   Lab 09/28/24 0418 09/29/24  0436    141   K 3.4* 3.7   * 111*   CO2 24 21*   * 126*   BUN 8 8   CREATININE 0.6 0.7   CALCIUM 8.4* 8.4*   PROT 5.5* 5.7*   ALBUMIN 2.9* 3.0*   BILITOT 0.5 0.8   ALKPHOS 53* 62   AST 17 20   ALT 13 15   ANIONGAP 7* 9       Diagnostic Results:  I have reviewed all pertinent imaging results/findings within the past 24 hours.

## 2024-09-29 NOTE — PROGRESS NOTES
[facility] - Oncology ([facility])  Hematology  Bone Marrow Transplant  Progress Note    Patient Name: Guillaume Salinas  Admission Date: 9/13/2024  Hospital Length of Stay: 16 days  Code Status: Full Code    Subjective:     Interval History: Day +10 /TBI PtCy haploidentical stem cell transplant for MDS. Nausea is minimal. Shoulder pain much better, controlled on current therapy. Diarrhea stable- about 9 BM daily, small volume. Hemorrhoid pain significant, but the hydrocortisone cream does provide relief. Reports now dry mouth, blood on lips from dryness/cracking. Using chapstick. Has not been consistent with oral hygiene rinses/regimen- discussed and will do better.    Objective:     Vital Signs (Most Recent):  Temp: 98.2 °F (36.8 °C) (09/29/24 1130)  Pulse: 102 (09/29/24 1130)  Resp: 18 (09/29/24 1130)  BP: (!) 144/75 (09/29/24 1130)  SpO2: (!) 93 % (09/29/24 1130) Vital Signs (24h Range):  Temp:  [97.4 °F (36.3 °C)-98.6 °F (37 °C)] 98.2 °F (36.8 °C)  Pulse:  [] 102  Resp:  [16-18] 18  SpO2:  [88 %-98 %] 93 %  BP: (135-160)/(74-84) 144/75     Weight: 74.5 kg (164 lb 3.9 oz)  Body mass index is 24.25 kg/m².  Body surface area is 1.9 meters squared.    ECOG SCORE           ECOG PS    Intake/Output - Last 3 Shifts         09/27 0700 09/28 0659 09/28 0700 09/29 0659 09/29 0700 09/30 0659    P.O. 1040 300     I.V. (mL/kg) 1692.5 (22.7) 1300 (17.4)     Total Intake(mL/kg) 2732.5 (36.7) 1600 (21.5)     Urine (mL/kg/hr) 800 (0.4)      Emesis/NG output 200      Stool 0      Total Output 1000      Net +1732.5 +1600            Urine Occurrence 5 x 3 x     Stool Occurrence 9 x 4 x              Physical Exam  Vitals and nursing note reviewed.   Constitutional:       Appearance: He is well-developed.   HENT:      Head: Normocephalic and atraumatic.      Mouth/Throat:      Mouth: Mucous membranes are dry.     Eyes:      General: No scleral icterus.     Conjunctiva/sclera: Conjunctivae normal.   Cardiovascular:       Rate and Rhythm: Normal rate.      Arteriovenous access: Right arteriovenous access is present.     Comments: Right chest wall vascular catheter  Pulmonary:      Effort: Pulmonary effort is normal. No respiratory distress.   Abdominal:      General: There is no distension.      Palpations: Abdomen is soft.      Tenderness: There is no abdominal tenderness.   Musculoskeletal:         General: Normal range of motion.      Cervical back: Normal range of motion and neck supple.   Skin:     General: Skin is warm and dry.   Neurological:      Mental Status: He is alert and oriented to person, place, and time.      Cranial Nerves: No cranial nerve deficit.   Psychiatric:         Behavior: Behavior normal.            Significant Labs:   CBC:   Recent Labs   Lab 09/28/24 0418 09/29/24 0436   WBC 0.01* 0.01*   HGB 6.6* 8.0*   HCT 19.2* 22.9*   PLT 15* 8*    and CMP:   Recent Labs   Lab 09/28/24 0418 09/29/24 0436    141   K 3.4* 3.7   * 111*   CO2 24 21*   * 126*   BUN 8 8   CREATININE 0.6 0.7   CALCIUM 8.4* 8.4*   PROT 5.5* 5.7*   ALBUMIN 2.9* 3.0*   BILITOT 0.5 0.8   ALKPHOS 53* 62   AST 17 20   ALT 13 15   ANIONGAP 7* 9       Diagnostic Results:  I have reviewed all pertinent imaging results/findings within the past 24 hours.  Assessment/Plan:     * S/P allogeneic bone marrow transplant  - Patient of Dr. 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 Donor is A+.  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 Patient will start letermovir ppx on 9/24/24.  - Received fresh BMT product on 9/19/24 with a CD34 dose of 3.55 x10^6.  - Of note, cilostazol may interact with tacro. (Currently on hold for plts < 50K).  - See treatment plan below:    Planned conditioning regimen:  Fludarabine  on Day -5, -4, -3, and -2  Melphalan on Day -2  TBI on Day -1     Planned  GVHD Prophylaxis:  Cyclophosphamide (with Mesna) on Days +3 and +4  Mycophenolate starting on Day +5  Tacrolimus starting on Day +5     Antimicrobial Prophylaxis:  Acyclovir starting on Day -5  Levofloxacin starting on Day -1  Micafungin on Day -1 through Day +4  Letermovir starting on Day +5 (if CMV PCR drawn on day 0 results negative)  Posaconazole starting on Day +5  Bactrim starting on Day +30     SOS/VOD Prophylaxis:  Ursodiol on Day -5 through Day +30      Growth Factor Support:  Neupogen starting on Day +5     Caregiver: wife   Post-transplant discharge plans: home        Dry mouth  - continue to moisten lips with chapstick  - encouraged use of oral hygiene rinses to sterilize mouth and stimulate saliva production    Urinary frequency  - reports increased nightly frequency and urgency with low output  - no formal BPH diagnosis; will start flomax and monitor for improvement    Chemotherapy-induced nausea  - scheduled zofran IV 8mg q8h on 9/27  - has prn compazine and ativan    Hemorrhoids  - d/t chemo induced diarrhea  - has anusol, tucks pads, and LETS gel  - patient denies any bleeding at site    Headache  - C/o headache 9/25  - Daily coffee drinker. Improved after drinking coffee.  - Suspect caffeine withdrawal.  - Can start caffeine tablet if patient is unable to drink coffee due to chemo side effects  - mild HA today, improved following caffeine intake    Chemotherapy induced diarrhea  - C-diff neg 9/24  - Of note, was treated empirically for c-diff with oral vanc prior to admission and is receiving oral vanc ppx while admitted.  - persists so imodium now scheduled ATC and added prn lomotil (9/26)    Neutropenic fever  - First fever with tmax 101.8F on 9/23  - Infection w/u unremarkable thus far  - No fevers since 9/23, Cefepime stopped on 9/25 and back on oral ppx LVQ.  RESOLVED    Insomnia  - has PRN Trazodone however not  currently using  - started on scheduled melatonin    History of Clostridioides difficile infection  - Was treated empirically for c-diff with oral vanc prior to admission.  - Continue oral vanc ppx per transplant protocol      Neuropathy  - Continue home gabapentin    Pancytopenia due to antineoplastic chemotherapy  - Daily CBC while inpatient  - Transfuse for hgb <7 Plt  or <10K  - Continue antimicrobial ppx  - Holding cilostazol for plts < 50K    Myelodysplastic syndrome  From Dr Hickey's clinic note 8/5:  Stem cell transplant candidate: We discussed the role for allogeneic stem cell transplantation in this disease process as a potentially curative option. We had an extensive discussion about the rationale, logistics, risks, and benefits. We reviewed the requirement to stay in the [place] area for 100 days with a caregiver at all times. We discussed the risks, including infection, graft failure, organ toxicity, graft versus host disease, relapse of disease, and secondary cancers. We reviewed the need for long-term immunosuppression and need for close monitoring. HCT-CI of 1 (intermediate risk). We had a prolonged discussion today regarding his recent deconditioning, Cdiff, and underlying disease. He is now much improved since last seen, and is improving his strength since starting to work with PT. Diarrhea now controlled. We discussed that our plan to move forward with the transplant process.   Coordinator: Fay Stephens  Regimen:  + 2Gy TBI  Donor: son (haplo)   Graft source: BM  - Admitted 9/13/24 for a  TBI PT Cy haplo (son) allogeneic BMT    Hypertension, essential  - Holding home Coreg for fluid responsive hypotension. Resume as indicated.    Coronary artery disease involving native coronary artery of native heart without angina pectoris  - Holding home Coreg for hypotension. Can resume as indicated.        VTE Risk Mitigation (From admission, onward)           Ordered     heparin, porcine  (PF) 100 unit/mL injection flush 300 Units  As needed (PRN)         09/13/24 9467                    Disposition: inpatient for stem cell transplant    Erika Lares MD  Bone Marrow Transplant  [facility] - Oncology ([facility])

## 2024-09-30 PROBLEM — E44.0 MODERATE MALNUTRITION: Status: ACTIVE | Noted: 2024-09-30

## 2024-09-30 LAB
ABO + RH BLD: NORMAL
ALBUMIN SERPL BCP-MCNC: 2.9 G/DL (ref 3.5–5.2)
ALP SERPL-CCNC: 59 U/L (ref 55–135)
ALT SERPL W/O P-5'-P-CCNC: 15 U/L (ref 10–44)
ANION GAP SERPL CALC-SCNC: 8 MMOL/L (ref 8–16)
ANISOCYTOSIS BLD QL SMEAR: SLIGHT
AST SERPL-CCNC: 17 U/L (ref 10–40)
BASOPHILS # BLD AUTO: 0 K/UL (ref 0–0.2)
BASOPHILS NFR BLD: 0 % (ref 0–1.9)
BILIRUB SERPL-MCNC: 0.7 MG/DL (ref 0.1–1)
BLD GP AB SCN CELLS X3 SERPL QL: NORMAL
BUN SERPL-MCNC: 7 MG/DL (ref 8–23)
CALCIUM SERPL-MCNC: 8.6 MG/DL (ref 8.7–10.5)
CHLORIDE SERPL-SCNC: 111 MMOL/L (ref 95–110)
CO2 SERPL-SCNC: 22 MMOL/L (ref 23–29)
CREAT SERPL-MCNC: 0.7 MG/DL (ref 0.5–1.4)
DIFFERENTIAL METHOD BLD: ABNORMAL
EOSINOPHIL # BLD AUTO: 0 K/UL (ref 0–0.5)
EOSINOPHIL NFR BLD: 0 % (ref 0–8)
ERYTHROCYTE [DISTWIDTH] IN BLOOD BY AUTOMATED COUNT: 12.9 % (ref 11.5–14.5)
EST. GFR  (NO RACE VARIABLE): >60 ML/MIN/1.73 M^2
GLUCOSE SERPL-MCNC: 120 MG/DL (ref 70–110)
HCT VFR BLD AUTO: 22.9 % (ref 40–54)
HGB BLD-MCNC: 7.9 G/DL (ref 14–18)
IMM GRANULOCYTES # BLD AUTO: 0 K/UL (ref 0–0.04)
IMM GRANULOCYTES NFR BLD AUTO: 0 % (ref 0–0.5)
LYMPHOCYTES # BLD AUTO: 0 K/UL (ref 1–4.8)
LYMPHOCYTES NFR BLD: 0 % (ref 18–48)
MAGNESIUM SERPL-MCNC: 1.5 MG/DL (ref 1.6–2.6)
MCH RBC QN AUTO: 31.2 PG (ref 27–31)
MCHC RBC AUTO-ENTMCNC: 34.5 G/DL (ref 32–36)
MCV RBC AUTO: 91 FL (ref 82–98)
MONOCYTES # BLD AUTO: 0 K/UL (ref 0.3–1)
MONOCYTES NFR BLD: 0 % (ref 4–15)
NEUTROPHILS # BLD AUTO: 0 K/UL (ref 1.8–7.7)
NEUTROPHILS NFR BLD: 100 % (ref 38–73)
NRBC BLD-RTO: 0 /100 WBC
OVALOCYTES BLD QL SMEAR: ABNORMAL
PHOSPHATE SERPL-MCNC: 2.5 MG/DL (ref 2.7–4.5)
PLATELET # BLD AUTO: 24 K/UL (ref 150–450)
PLATELET BLD QL SMEAR: ABNORMAL
PMV BLD AUTO: 11.1 FL (ref 9.2–12.9)
POIKILOCYTOSIS BLD QL SMEAR: SLIGHT
POTASSIUM SERPL-SCNC: 3.6 MMOL/L (ref 3.5–5.1)
PROT SERPL-MCNC: 5.7 G/DL (ref 6–8.4)
RBC # BLD AUTO: 2.53 M/UL (ref 4.6–6.2)
SODIUM SERPL-SCNC: 141 MMOL/L (ref 136–145)
SPECIMEN OUTDATE: NORMAL
TACROLIMUS BLD-MCNC: 9.3 NG/ML (ref 5–15)
WBC # BLD AUTO: 0.01 K/UL (ref 3.9–12.7)

## 2024-09-30 PROCEDURE — 84100 ASSAY OF PHOSPHORUS: CPT | Performed by: NURSE PRACTITIONER

## 2024-09-30 PROCEDURE — 85025 COMPLETE CBC W/AUTO DIFF WBC: CPT | Performed by: NURSE PRACTITIONER

## 2024-09-30 PROCEDURE — 25000003 PHARM REV CODE 250: Performed by: INTERNAL MEDICINE

## 2024-09-30 PROCEDURE — 86850 RBC ANTIBODY SCREEN: CPT | Performed by: INTERNAL MEDICINE

## 2024-09-30 PROCEDURE — 20600001 HC STEP DOWN PRIVATE ROOM

## 2024-09-30 PROCEDURE — 86901 BLOOD TYPING SEROLOGIC RH(D): CPT | Performed by: INTERNAL MEDICINE

## 2024-09-30 PROCEDURE — 80197 ASSAY OF TACROLIMUS: CPT | Performed by: INTERNAL MEDICINE

## 2024-09-30 PROCEDURE — 25000003 PHARM REV CODE 250: Performed by: NURSE PRACTITIONER

## 2024-09-30 PROCEDURE — 63600175 PHARM REV CODE 636 W HCPCS: Performed by: NURSE PRACTITIONER

## 2024-09-30 PROCEDURE — 80053 COMPREHEN METABOLIC PANEL: CPT | Performed by: NURSE PRACTITIONER

## 2024-09-30 PROCEDURE — 83735 ASSAY OF MAGNESIUM: CPT | Performed by: NURSE PRACTITIONER

## 2024-09-30 PROCEDURE — 63600175 PHARM REV CODE 636 W HCPCS: Performed by: INTERNAL MEDICINE

## 2024-09-30 RX ORDER — PROCHLORPERAZINE EDISYLATE 5 MG/ML
5 INJECTION INTRAMUSCULAR; INTRAVENOUS EVERY 6 HOURS
Status: DISCONTINUED | OUTPATIENT
Start: 2024-09-30 | End: 2024-10-08

## 2024-09-30 RX ORDER — CARVEDILOL 6.25 MG/1
6.25 TABLET ORAL 2 TIMES DAILY
Status: DISCONTINUED | OUTPATIENT
Start: 2024-09-30 | End: 2024-10-16 | Stop reason: HOSPADM

## 2024-09-30 RX ORDER — SODIUM CHLORIDE AND POTASSIUM CHLORIDE 150; 900 MG/100ML; MG/100ML
INJECTION, SOLUTION INTRAVENOUS CONTINUOUS
Status: DISCONTINUED | OUTPATIENT
Start: 2024-09-30 | End: 2024-10-09

## 2024-09-30 RX ORDER — MORPHINE SULFATE 2 MG/ML
2 INJECTION, SOLUTION INTRAMUSCULAR; INTRAVENOUS EVERY 4 HOURS PRN
Status: DISCONTINUED | OUTPATIENT
Start: 2024-09-30 | End: 2024-10-10

## 2024-09-30 RX ADMIN — Medication 1 DOSE: at 06:09

## 2024-09-30 RX ADMIN — Medication 1 DOSE: at 08:09

## 2024-09-30 RX ADMIN — VANCOMYCIN HYDROCHLORIDE 125 MG: KIT at 08:09

## 2024-09-30 RX ADMIN — PROCHLORPERAZINE EDISYLATE 5 MG: 5 INJECTION INTRAMUSCULAR; INTRAVENOUS at 01:09

## 2024-09-30 RX ADMIN — ONDANSETRON 8 MG: 2 INJECTION INTRAMUSCULAR; INTRAVENOUS at 01:09

## 2024-09-30 RX ADMIN — CARVEDILOL 6.25 MG: 6.25 TABLET, FILM COATED ORAL at 08:09

## 2024-09-30 RX ADMIN — LOPERAMIDE HYDROCHLORIDE 2 MG: 2 CAPSULE ORAL at 08:09

## 2024-09-30 RX ADMIN — ACYCLOVIR 800 MG: 200 CAPSULE ORAL at 08:09

## 2024-09-30 RX ADMIN — Medication 400 MG: at 11:09

## 2024-09-30 RX ADMIN — DIPHENOXYLATE HYDROCHLORIDE AND ATROPINE SULFATE 1 TABLET: .025; 2.5 TABLET ORAL at 04:09

## 2024-09-30 RX ADMIN — SODIUM CHLORIDE AND POTASSIUM CHLORIDE 75 ML/HR: .9; .15 SOLUTION INTRAVENOUS at 04:09

## 2024-09-30 RX ADMIN — Medication 1 TABLET: at 06:09

## 2024-09-30 RX ADMIN — Medication 1 TABLET: at 08:09

## 2024-09-30 RX ADMIN — MYCOPHENOLATE MOFETIL 1000 MG: 250 CAPSULE ORAL at 08:09

## 2024-09-30 RX ADMIN — PANTOPRAZOLE SODIUM 40 MG: 40 TABLET, DELAYED RELEASE ORAL at 08:09

## 2024-09-30 RX ADMIN — URSODIOL 300 MG: 300 CAPSULE ORAL at 08:09

## 2024-09-30 RX ADMIN — GABAPENTIN 600 MG: 300 CAPSULE ORAL at 08:09

## 2024-09-30 RX ADMIN — ONDANSETRON 8 MG: 2 INJECTION INTRAMUSCULAR; INTRAVENOUS at 10:09

## 2024-09-30 RX ADMIN — LOPERAMIDE HYDROCHLORIDE 2 MG: 2 CAPSULE ORAL at 01:09

## 2024-09-30 RX ADMIN — LETERMOVIR 480 MG: 480 TABLET, FILM COATED ORAL at 08:09

## 2024-09-30 RX ADMIN — LEVOFLOXACIN 500 MG: 500 TABLET, FILM COATED ORAL at 08:09

## 2024-09-30 RX ADMIN — SODIUM CHLORIDE AND POTASSIUM CHLORIDE: .9; .15 SOLUTION INTRAVENOUS at 04:09

## 2024-09-30 RX ADMIN — LOPERAMIDE HYDROCHLORIDE 2 MG: 2 CAPSULE ORAL at 06:09

## 2024-09-30 RX ADMIN — TACROLIMUS 1 MG: 1 CAPSULE ORAL at 08:09

## 2024-09-30 RX ADMIN — HYDROCORTISONE: 25 CREAM TOPICAL at 08:09

## 2024-09-30 RX ADMIN — MYCOPHENOLATE MOFETIL 1000 MG: 250 CAPSULE ORAL at 01:09

## 2024-09-30 RX ADMIN — FILGRASTIM 300 MCG: 300 INJECTION, SOLUTION INTRAVENOUS; SUBCUTANEOUS at 08:09

## 2024-09-30 RX ADMIN — ONDANSETRON 8 MG: 2 INJECTION INTRAMUSCULAR; INTRAVENOUS at 06:09

## 2024-09-30 RX ADMIN — Medication 6 MG: at 08:09

## 2024-09-30 RX ADMIN — MORPHINE SULFATE 2 MG: 2 INJECTION, SOLUTION INTRAMUSCULAR; INTRAVENOUS at 06:09

## 2024-09-30 RX ADMIN — Medication 400 MG: at 06:09

## 2024-09-30 RX ADMIN — TACROLIMUS 1 MG: 1 CAPSULE ORAL at 06:09

## 2024-09-30 RX ADMIN — PROCHLORPERAZINE EDISYLATE 5 MG: 5 INJECTION INTRAMUSCULAR; INTRAVENOUS at 06:09

## 2024-09-30 RX ADMIN — LIDOCAINE 5% 1 PATCH: 700 PATCH TOPICAL at 08:09

## 2024-09-30 RX ADMIN — Medication 1 DOSE: at 01:09

## 2024-09-30 RX ADMIN — Medication 1 TABLET: at 01:09

## 2024-09-30 RX ADMIN — POTASSIUM CHLORIDE 20 MEQ: 1500 TABLET, EXTENDED RELEASE ORAL at 06:09

## 2024-09-30 RX ADMIN — POSACONAZOLE 300 MG: 100 TABLET, DELAYED RELEASE ORAL at 08:09

## 2024-09-30 RX ADMIN — Medication: at 08:09

## 2024-09-30 RX ADMIN — TAMSULOSIN HYDROCHLORIDE 0.4 MG: 0.4 CAPSULE ORAL at 08:09

## 2024-09-30 RX ADMIN — LIDOCAINE 5% 1 PATCH: 700 PATCH TOPICAL at 11:09

## 2024-09-30 RX ADMIN — Medication 400 MG: at 02:09

## 2024-09-30 NOTE — ASSESSMENT & PLAN NOTE
- C/o headache 9/25  - Daily coffee drinker. Improved after drinking coffee.  - Suspect caffeine withdrawal.  - Can start caffeine tablet if patient is unable to drink coffee due to chemo side effects  - none today

## 2024-09-30 NOTE — ASSESSMENT & PLAN NOTE
- C-diff neg 9/24  - Of note, was treated empirically for c-diff with oral vanc prior to admission and is receiving oral vanc ppx while admitted.  - persists so imodium now scheduled ATC and added prn lomotil (9/26); if does not improve consider scheduling lomotil

## 2024-09-30 NOTE — SUBJECTIVE & OBJECTIVE
Subjective:     Interval History: Day +11 s/p  + TBI + PT Cy Haploidentical (son) BMT for MDS. Tacro level 9.3, remains on 1mg BID. Continues with nausea. Reports improvement in diarrhea, although worsening pain to hemorrhoids. Shoulder pain improved with lidocaine patches. Restarting home coreg for elevated BP/HR. Replacing K, mag, and phos. Afebrile, VSS    Objective:     Vital Signs (Most Recent):  Temp: 98.5 °F (36.9 °C) (09/30/24 1141)  Pulse: 105 (09/30/24 1141)  Resp: 18 (09/30/24 1141)  BP: 131/74 (09/30/24 1141)  SpO2: 97 % (09/30/24 1141) Vital Signs (24h Range):  Temp:  [97.6 °F (36.4 °C)-98.9 °F (37.2 °C)] 98.5 °F (36.9 °C)  Pulse:  [] 105  Resp:  [17-18] 18  SpO2:  [90 %-98 %] 97 %  BP: (126-158)/(64-84) 131/74     Weight: 74.4 kg (164 lb 0.4 oz)  Body mass index is 24.22 kg/m².  Body surface area is 1.9 meters squared.      Intake/Output - Last 3 Shifts         09/28 0700  09/29 0659 09/29 0700  09/30 0659 09/30 0700  10/01 0659    P.O. 300 400     I.V. (mL/kg) 1300 (17.4)      Total Intake(mL/kg) 1600 (21.5) 400 (5.4)     Urine (mL/kg/hr)       Emesis/NG output       Stool       Total Output       Net +1600 +400            Urine Occurrence 3 x 2 x     Stool Occurrence 4 x 7 x              Physical Exam  Vitals and nursing note reviewed.   Constitutional:       Appearance: Normal appearance. He is well-developed.   HENT:      Head: Normocephalic and atraumatic.      Nose: Nose normal.      Mouth/Throat:      Pharynx: No oropharyngeal exudate or posterior oropharyngeal erythema.   Eyes:      General:         Right eye: No discharge.         Left eye: No discharge.      Extraocular Movements: Extraocular movements intact.      Conjunctiva/sclera: Conjunctivae normal.   Cardiovascular:      Rate and Rhythm: Normal rate and regular rhythm.      Heart sounds: Normal heart sounds. No murmur heard.  Pulmonary:      Effort: Pulmonary effort is normal. No respiratory distress.      Breath sounds:  Normal breath sounds. No wheezing or rales.   Abdominal:      General: Bowel sounds are normal. There is no distension.      Palpations: Abdomen is soft.      Tenderness: There is no abdominal tenderness.   Musculoskeletal:         General: No deformity. Normal range of motion.      Cervical back: Normal range of motion and neck supple.      Right lower leg: No edema.      Left lower leg: No edema.   Skin:     General: Skin is warm and dry.      Findings: No erythema or rash.      Comments: Right chest wall vas cath. Dressing c/d/i. No sign of infection to site.   Neurological:      General: No focal deficit present.      Mental Status: He is alert and oriented to person, place, and time.      Motor: No weakness.   Psychiatric:         Mood and Affect: Mood normal.         Behavior: Behavior normal.         Thought Content: Thought content normal.         Judgment: Judgment normal.            Significant Labs:   CBC:   Recent Labs   Lab 09/29/24  0436 09/30/24  0428   WBC 0.01* 0.01*   HGB 8.0* 7.9*   HCT 22.9* 22.9*   PLT 8* 24*    and CMP:   Recent Labs   Lab 09/29/24  0436 09/30/24  0428    141   K 3.7 3.6   * 111*   CO2 21* 22*   * 120*   BUN 8 7*   CREATININE 0.7 0.7   CALCIUM 8.4* 8.6*   PROT 5.7* 5.7*   ALBUMIN 3.0* 2.9*   BILITOT 0.8 0.7   ALKPHOS 62 59   AST 20 17   ALT 15 15   ANIONGAP 9 8       Diagnostic Results:  I have reviewed all pertinent imaging results/findings within the past 24 hours.

## 2024-09-30 NOTE — PLAN OF CARE
Problem: Adult Inpatient Plan of Care  Goal: Plan of Care Review  Outcome: Progressing     Problem: Adult Inpatient Plan of Care  Goal: Optimal Comfort and Wellbeing  Outcome: Progressing   VSS. Denies any pain. Fluids infusing. Patient reports 3-4 Bms on shift. No acute changes overnight. Bed alarm refused and call light within reach. Family at bedside.

## 2024-09-30 NOTE — ASSESSMENT & PLAN NOTE
From Dr Hickey's clinic note 8/5:  Stem cell transplant candidate: We discussed the role for allogeneic stem cell transplantation in this disease process as a potentially curative option. We had an extensive discussion about the rationale, logistics, risks, and benefits. We reviewed the requirement to stay in the [place] area for 100 days with a caregiver at all times. We discussed the risks, including infection, graft failure, organ toxicity, graft versus host disease, relapse of disease, and secondary cancers. We reviewed the need for long-term immunosuppression and need for close monitoring. HCT-CI of 1 (intermediate risk). We had a prolonged discussion today regarding his recent deconditioning, Cdiff, and underlying disease. He is now much improved since last seen, and is improving his strength since starting to work with PT. Diarrhea now controlled. We discussed that our plan to move forward with the transplant process.   Coordinator: Fay Stephens  Regimen:  + 2Gy TBI  Donor: son (haplo)   Graft source: BM  - Admitted 9/13/24 for a  TBI PT Cy haplo (son) allogeneic BMT

## 2024-09-30 NOTE — PROGRESS NOTES
Janusz Ma - Oncology ([facility])  Hematology  Bone Marrow Transplant  Progress Note    Patient Name: Guillaume Salinas  Admission Date: 9/13/2024  Hospital Length of Stay: 17 days  Code Status: Full Code    Subjective:     Interval History: Day +11 s/p  + TBI + PT Cy Haploidentical (son) BMT for MDS. Tacro level 9.3, remains on 1mg BID. Continues with nausea. Reports improvement in diarrhea, although worsening pain to hemorrhoids. Shoulder pain improved with lidocaine patches. Restarting home coreg for elevated BP/HR. Replacing K, mag, and phos. Afebrile, VSS    Objective:     Vital Signs (Most Recent):  Temp: 98.5 °F (36.9 °C) (09/30/24 1141)  Pulse: 105 (09/30/24 1141)  Resp: 18 (09/30/24 1141)  BP: 131/74 (09/30/24 1141)  SpO2: 97 % (09/30/24 1141) Vital Signs (24h Range):  Temp:  [97.6 °F (36.4 °C)-98.9 °F (37.2 °C)] 98.5 °F (36.9 °C)  Pulse:  [] 105  Resp:  [17-18] 18  SpO2:  [90 %-98 %] 97 %  BP: (126-158)/(64-84) 131/74     Weight: 74.4 kg (164 lb 0.4 oz)  Body mass index is 24.22 kg/m².  Body surface area is 1.9 meters squared.      Intake/Output - Last 3 Shifts         09/28 0700  09/29 0659 09/29 0700  09/30 0659 09/30 0700  10/01 0659    P.O. 300 400     I.V. (mL/kg) 1300 (17.4)      Total Intake(mL/kg) 1600 (21.5) 400 (5.4)     Urine (mL/kg/hr)       Emesis/NG output       Stool       Total Output       Net +1600 +400            Urine Occurrence 3 x 2 x     Stool Occurrence 4 x 7 x              Physical Exam  Vitals and nursing note reviewed.   Constitutional:       Appearance: Normal appearance. He is well-developed.   HENT:      Head: Normocephalic and atraumatic.      Nose: Nose normal.      Mouth/Throat:      Pharynx: No oropharyngeal exudate or posterior oropharyngeal erythema.   Eyes:      General:         Right eye: No discharge.         Left eye: No discharge.      Extraocular Movements: Extraocular movements intact.      Conjunctiva/sclera: Conjunctivae normal.    Cardiovascular:      Rate and Rhythm: Normal rate and regular rhythm.      Heart sounds: Normal heart sounds. No murmur heard.  Pulmonary:      Effort: Pulmonary effort is normal. No respiratory distress.      Breath sounds: Normal breath sounds. No wheezing or rales.   Abdominal:      General: Bowel sounds are normal. There is no distension.      Palpations: Abdomen is soft.      Tenderness: There is no abdominal tenderness.   Musculoskeletal:         General: No deformity. Normal range of motion.      Cervical back: Normal range of motion and neck supple.      Right lower leg: No edema.      Left lower leg: No edema.   Skin:     General: Skin is warm and dry.      Findings: No erythema or rash.      Comments: Right chest wall vas cath. Dressing c/d/i. No sign of infection to site.   Neurological:      General: No focal deficit present.      Mental Status: He is alert and oriented to person, place, and time.      Motor: No weakness.   Psychiatric:         Mood and Affect: Mood normal.         Behavior: Behavior normal.         Thought Content: Thought content normal.         Judgment: Judgment normal.            Significant Labs:   CBC:   Recent Labs   Lab 09/29/24  0436 09/30/24  0428   WBC 0.01* 0.01*   HGB 8.0* 7.9*   HCT 22.9* 22.9*   PLT 8* 24*    and CMP:   Recent Labs   Lab 09/29/24  0436 09/30/24  0428    141   K 3.7 3.6   * 111*   CO2 21* 22*   * 120*   BUN 8 7*   CREATININE 0.7 0.7   CALCIUM 8.4* 8.6*   PROT 5.7* 5.7*   ALBUMIN 3.0* 2.9*   BILITOT 0.8 0.7   ALKPHOS 62 59   AST 20 17   ALT 15 15   ANIONGAP 9 8       Diagnostic Results:  I have reviewed all pertinent imaging results/findings within the past 24 hours.  Assessment/Plan:     * S/P allogeneic bone marrow transplant  - Patient of Dr. 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 Donor is A+.  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 Patient will start letermovir ppx on 9/24/24.  - Received fresh BMT product on 9/19/24 with a CD34 dose of 3.55 x10^6.  - Of note, cilostazol may interact with tacro. (Currently on hold for plts < 50K).  - See treatment plan below:    Planned conditioning regimen:  Fludarabine on Day -5, -4, -3, and -2  Melphalan on Day -2  TBI on Day -1     Planned  GVHD Prophylaxis:  Cyclophosphamide (with Mesna) on Days +3 and +4  Mycophenolate starting on Day +5  Tacrolimus starting on Day +5     Antimicrobial Prophylaxis:  Acyclovir starting on Day -5  Levofloxacin starting on Day -1  Micafungin on Day -1 through Day +4  Letermovir starting on Day +5 (if CMV PCR drawn on day 0 results negative)  Posaconazole starting on Day +5  Bactrim starting on Day +30     SOS/VOD Prophylaxis:  Ursodiol on Day -5 through Day +30      Growth Factor Support:  Neupogen starting on Day +5     Caregiver: wife   Post-transplant discharge plans: home        Myelodysplastic syndrome  From Dr Hickey's clinic note 8/5:  Stem cell transplant candidate: We discussed the role for allogeneic stem cell transplantation in this disease process as a potentially curative option. We had an extensive discussion about the rationale, logistics, risks, and benefits. We reviewed the requirement to stay in the [place] area for 100 days with a caregiver at all times. We discussed the risks, including infection, graft failure, organ toxicity, graft versus host disease, relapse of disease, and secondary cancers. We reviewed the need for long-term immunosuppression and need for close monitoring. HCT-CI of 1 (intermediate risk). We had a prolonged discussion today regarding his recent deconditioning, Cdiff, and underlying disease. He is now much improved since last seen, and is improving his strength since starting to work with PT. Diarrhea now  controlled. We discussed that our plan to move forward with the transplant process.   Coordinator: Fay Stephens  Regimen:  + 2Gy TBI  Donor: son (haplo)   Graft source: BM  - Admitted 9/13/24 for a  TBI PT Cy haplo (son) allogeneic BMT    Pancytopenia due to antineoplastic chemotherapy  - Daily CBC while inpatient  - Transfuse for hgb <7 Plt  or <10K  - Continue antimicrobial ppx  - Holding cilostazol for plts < 50K    Chemotherapy induced diarrhea  - C-diff neg 9/24  - Of note, was treated empirically for c-diff with oral vanc prior to admission and is receiving oral vanc ppx while admitted.  - persists so imodium now scheduled ATC and added prn lomotil (9/26); if does not improve consider scheduling lomotil    Hemorrhoids  - d/t chemo induced diarrhea  - has anusol, tucks pads, and LETS gel  - patient denies any bleeding at site  - has PRN morphine for pain control    Neutropenic fever  - First fever with tmax 101.8F on 9/23  - Infection w/u unremarkable thus far  - No fevers since 9/23, Cefepime stopped on 9/25 and back on oral ppx LVQ.  RESOLVED    Moderate malnutrition  Nutrition consulted. Most recent weight and BMI monitored-     Measurements:  Wt Readings from Last 1 Encounters:   09/30/24 74.4 kg (164 lb 0.4 oz)   Body mass index is 24.22 kg/m².    Patient has been screened and assessed by RD.    - Switched boost plus to boost breeze as diary of plus making patient nauseous  - continue IVF; rate increased to 100ml/hr  - PO intake as tolerated; on scheduled antiemetics for nausea      Dry mouth  - continue to moisten lips with chapstick  - encouraged use of oral hygiene rinses to sterilize mouth and stimulate saliva production    Urinary frequency  - reports increased nightly frequency and urgency with low output  - no formal BPH diagnosis; started flomax 9/27  - monitor for improvement (can increase dose if not effective)    Chemotherapy-induced nausea  - scheduled zofran IV 8mg q8h on 9/27;  added scheduled compazine 9/30  - has prn ativan    Headache  - C/o headache 9/25  - Daily coffee drinker. Improved after drinking coffee.  - Suspect caffeine withdrawal.  - Can start caffeine tablet if patient is unable to drink coffee due to chemo side effects  - none today    Insomnia  - has PRN Trazodone however not currently using  - started on scheduled melatonin    History of Clostridioides difficile infection  - Was treated empirically for c-diff with oral vanc prior to admission.  - Continue oral vanc ppx per transplant protocol      Neuropathy  - Continue home gabapentin    Hypertension, essential  - Holding home Coreg for fluid responsive hypotension. Resumed 9/30    Coronary artery disease involving native coronary artery of native heart without angina pectoris  - Holding home Coreg for hypotension. Can resume as indicated.        VTE Risk Mitigation (From admission, onward)           Ordered     heparin, porcine (PF) 100 unit/mL injection flush 300 Units  As needed (PRN)         09/13/24 4224                    Disposition: Remains inpatient    Judy Anthony NP  Bone Marrow Transplant  Janusz Ma - Oncology ([facility])

## 2024-09-30 NOTE — ASSESSMENT & PLAN NOTE
- Patient of Dr. 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 Donor is A+.  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 Patient will start letermovir ppx on 9/24/24.  - Received fresh BMT product on 9/19/24 with a CD34 dose of 3.55 x10^6.  - Of note, cilostazol may interact with tacro. (Currently on hold for plts < 50K).  - See treatment plan below:    Planned conditioning regimen:  Fludarabine on Day -5, -4, -3, and -2  Melphalan on Day -2  TBI on Day -1     Planned  GVHD Prophylaxis:  Cyclophosphamide (with Mesna) on Days +3 and +4  Mycophenolate starting on Day +5  Tacrolimus starting on Day +5     Antimicrobial Prophylaxis:  Acyclovir starting on Day -5  Levofloxacin starting on Day -1  Micafungin on Day -1 through Day +4  Letermovir starting on Day +5 (if CMV PCR drawn on day 0 results negative)  Posaconazole starting on Day +5  Bactrim starting on Day +30     SOS/VOD Prophylaxis:  Ursodiol on Day -5 through Day +30      Growth Factor Support:  Neupogen starting on Day +5     Caregiver: wife   Post-transplant discharge plans: home

## 2024-09-30 NOTE — ASSESSMENT & PLAN NOTE
Nutrition consulted. Most recent weight and BMI monitored-     Measurements:  Wt Readings from Last 1 Encounters:   09/30/24 74.4 kg (164 lb 0.4 oz)   Body mass index is 24.22 kg/m².    Patient has been screened and assessed by RD.    - Switched boost plus to boost breeze as diary of plus making patient nauseous  - continue IVF; rate increased to 100ml/hr  - PO intake as tolerated; on scheduled antiemetics for nausea

## 2024-09-30 NOTE — ASSESSMENT & PLAN NOTE
- d/t chemo induced diarrhea  - has anusol, tucks pads, and LETS gel  - patient denies any bleeding at site  - has PRN morphine for pain control

## 2024-09-30 NOTE — ASSESSMENT & PLAN NOTE
- reports increased nightly frequency and urgency with low output  - no formal BPH diagnosis; started flomax 9/27  - monitor for improvement (can increase dose if not effective)

## 2024-10-01 PROBLEM — E83.42 HYPOMAGNESEMIA: Status: ACTIVE | Noted: 2024-10-01

## 2024-10-01 PROBLEM — E87.8 ELECTROLYTE DISORDER: Status: ACTIVE | Noted: 2024-10-01

## 2024-10-01 LAB
ALBUMIN SERPL BCP-MCNC: 2.9 G/DL (ref 3.5–5.2)
ALP SERPL-CCNC: 57 U/L (ref 55–135)
ALT SERPL W/O P-5'-P-CCNC: 14 U/L (ref 10–44)
ANION GAP SERPL CALC-SCNC: 5 MMOL/L (ref 8–16)
ANISOCYTOSIS BLD QL SMEAR: SLIGHT
AST SERPL-CCNC: 15 U/L (ref 10–40)
BASOPHILS # BLD AUTO: 0 K/UL (ref 0–0.2)
BASOPHILS NFR BLD: 0 % (ref 0–1.9)
BILIRUB SERPL-MCNC: 0.7 MG/DL (ref 0.1–1)
BUN SERPL-MCNC: 10 MG/DL (ref 8–23)
CALCIUM SERPL-MCNC: 8.7 MG/DL (ref 8.7–10.5)
CHLORIDE SERPL-SCNC: 111 MMOL/L (ref 95–110)
CO2 SERPL-SCNC: 23 MMOL/L (ref 23–29)
CREAT SERPL-MCNC: 0.7 MG/DL (ref 0.5–1.4)
DIFFERENTIAL METHOD BLD: ABNORMAL
EOSINOPHIL # BLD AUTO: 0 K/UL (ref 0–0.5)
EOSINOPHIL NFR BLD: 0 % (ref 0–8)
ERYTHROCYTE [DISTWIDTH] IN BLOOD BY AUTOMATED COUNT: 12.6 % (ref 11.5–14.5)
EST. GFR  (NO RACE VARIABLE): >60 ML/MIN/1.73 M^2
GLUCOSE SERPL-MCNC: 114 MG/DL (ref 70–110)
HCT VFR BLD AUTO: 21.5 % (ref 40–54)
HGB BLD-MCNC: 7.5 G/DL (ref 14–18)
HYPOCHROMIA BLD QL SMEAR: ABNORMAL
IMM GRANULOCYTES # BLD AUTO: 0 K/UL (ref 0–0.04)
IMM GRANULOCYTES NFR BLD AUTO: 0 % (ref 0–0.5)
LYMPHOCYTES # BLD AUTO: 0 K/UL (ref 1–4.8)
LYMPHOCYTES NFR BLD: 0 % (ref 18–48)
MAGNESIUM SERPL-MCNC: 1.4 MG/DL (ref 1.6–2.6)
MCH RBC QN AUTO: 30.9 PG (ref 27–31)
MCHC RBC AUTO-ENTMCNC: 34.9 G/DL (ref 32–36)
MCV RBC AUTO: 89 FL (ref 82–98)
MONOCYTES # BLD AUTO: 0 K/UL (ref 0.3–1)
MONOCYTES NFR BLD: 0 % (ref 4–15)
NEUTROPHILS # BLD AUTO: 0 K/UL (ref 1.8–7.7)
NEUTROPHILS NFR BLD: 100 % (ref 38–73)
NRBC BLD-RTO: 0 /100 WBC
OVALOCYTES BLD QL SMEAR: ABNORMAL
PHOSPHATE SERPL-MCNC: 3.2 MG/DL (ref 2.7–4.5)
PLATELET # BLD AUTO: 12 K/UL (ref 150–450)
PMV BLD AUTO: 12 FL (ref 9.2–12.9)
POIKILOCYTOSIS BLD QL SMEAR: SLIGHT
POLYCHROMASIA BLD QL SMEAR: ABNORMAL
POTASSIUM SERPL-SCNC: 4.3 MMOL/L (ref 3.5–5.1)
PROT SERPL-MCNC: 5.6 G/DL (ref 6–8.4)
RBC # BLD AUTO: 2.43 M/UL (ref 4.6–6.2)
SODIUM SERPL-SCNC: 139 MMOL/L (ref 136–145)
SPHEROCYTES BLD QL SMEAR: ABNORMAL
WBC # BLD AUTO: 0.01 K/UL (ref 3.9–12.7)

## 2024-10-01 PROCEDURE — 25000003 PHARM REV CODE 250: Performed by: NURSE PRACTITIONER

## 2024-10-01 PROCEDURE — 97116 GAIT TRAINING THERAPY: CPT | Mod: CQ

## 2024-10-01 PROCEDURE — 20600001 HC STEP DOWN PRIVATE ROOM

## 2024-10-01 PROCEDURE — 85025 COMPLETE CBC W/AUTO DIFF WBC: CPT | Performed by: NURSE PRACTITIONER

## 2024-10-01 PROCEDURE — 63600175 PHARM REV CODE 636 W HCPCS: Performed by: NURSE PRACTITIONER

## 2024-10-01 PROCEDURE — 25000003 PHARM REV CODE 250: Performed by: INTERNAL MEDICINE

## 2024-10-01 PROCEDURE — 80053 COMPREHEN METABOLIC PANEL: CPT | Performed by: NURSE PRACTITIONER

## 2024-10-01 PROCEDURE — 94761 N-INVAS EAR/PLS OXIMETRY MLT: CPT

## 2024-10-01 PROCEDURE — 84100 ASSAY OF PHOSPHORUS: CPT | Performed by: NURSE PRACTITIONER

## 2024-10-01 PROCEDURE — 63600175 PHARM REV CODE 636 W HCPCS: Performed by: INTERNAL MEDICINE

## 2024-10-01 PROCEDURE — 83735 ASSAY OF MAGNESIUM: CPT | Performed by: NURSE PRACTITIONER

## 2024-10-01 RX ORDER — CHOLESTYRAMINE 4 G/9G
1 POWDER, FOR SUSPENSION ORAL 2 TIMES DAILY
Status: DISCONTINUED | OUTPATIENT
Start: 2024-10-01 | End: 2024-10-10

## 2024-10-01 RX ADMIN — LIDOCAINE 5% 1 PATCH: 700 PATCH TOPICAL at 09:10

## 2024-10-01 RX ADMIN — TAMSULOSIN HYDROCHLORIDE 0.4 MG: 0.4 CAPSULE ORAL at 09:10

## 2024-10-01 RX ADMIN — HYDROCORTISONE: 25 CREAM TOPICAL at 09:10

## 2024-10-01 RX ADMIN — TACROLIMUS 1 MG: 1 CAPSULE ORAL at 08:10

## 2024-10-01 RX ADMIN — URSODIOL 300 MG: 300 CAPSULE ORAL at 09:10

## 2024-10-01 RX ADMIN — SODIUM CHLORIDE AND POTASSIUM CHLORIDE: .9; .15 SOLUTION INTRAVENOUS at 02:10

## 2024-10-01 RX ADMIN — ACYCLOVIR 800 MG: 200 CAPSULE ORAL at 09:10

## 2024-10-01 RX ADMIN — Medication: at 09:10

## 2024-10-01 RX ADMIN — Medication 400 MG: at 06:10

## 2024-10-01 RX ADMIN — ONDANSETRON 8 MG: 2 INJECTION INTRAMUSCULAR; INTRAVENOUS at 02:10

## 2024-10-01 RX ADMIN — CHOLESTYRAMINE 4 G: 4 POWDER, FOR SUSPENSION ORAL at 10:10

## 2024-10-01 RX ADMIN — Medication 1 DOSE: at 12:10

## 2024-10-01 RX ADMIN — Medication 1 DOSE: at 08:10

## 2024-10-01 RX ADMIN — HYDROCORTISONE: 25 CREAM TOPICAL at 08:10

## 2024-10-01 RX ADMIN — GABAPENTIN 600 MG: 300 CAPSULE ORAL at 09:10

## 2024-10-01 RX ADMIN — MYCOPHENOLATE MOFETIL 1000 MG: 250 CAPSULE ORAL at 02:10

## 2024-10-01 RX ADMIN — Medication 400 MG: at 10:10

## 2024-10-01 RX ADMIN — FILGRASTIM 300 MCG: 300 INJECTION, SOLUTION INTRAVENOUS; SUBCUTANEOUS at 09:10

## 2024-10-01 RX ADMIN — MYCOPHENOLATE MOFETIL 1000 MG: 250 CAPSULE ORAL at 08:10

## 2024-10-01 RX ADMIN — GABAPENTIN 600 MG: 300 CAPSULE ORAL at 08:10

## 2024-10-01 RX ADMIN — VANCOMYCIN HYDROCHLORIDE 125 MG: KIT at 09:10

## 2024-10-01 RX ADMIN — LOPERAMIDE HYDROCHLORIDE 2 MG: 2 CAPSULE ORAL at 05:10

## 2024-10-01 RX ADMIN — LOPERAMIDE HYDROCHLORIDE 2 MG: 2 CAPSULE ORAL at 09:10

## 2024-10-01 RX ADMIN — PANTOPRAZOLE SODIUM 40 MG: 40 TABLET, DELAYED RELEASE ORAL at 09:10

## 2024-10-01 RX ADMIN — MYCOPHENOLATE MOFETIL 1000 MG: 250 CAPSULE ORAL at 09:10

## 2024-10-01 RX ADMIN — VANCOMYCIN HYDROCHLORIDE 125 MG: KIT at 08:10

## 2024-10-01 RX ADMIN — PROCHLORPERAZINE EDISYLATE 5 MG: 5 INJECTION INTRAMUSCULAR; INTRAVENOUS at 05:10

## 2024-10-01 RX ADMIN — POSACONAZOLE 300 MG: 100 TABLET, DELAYED RELEASE ORAL at 09:10

## 2024-10-01 RX ADMIN — CARVEDILOL 6.25 MG: 6.25 TABLET, FILM COATED ORAL at 09:10

## 2024-10-01 RX ADMIN — TACROLIMUS 1 MG: 1 CAPSULE ORAL at 05:10

## 2024-10-01 RX ADMIN — ACYCLOVIR 800 MG: 200 CAPSULE ORAL at 08:10

## 2024-10-01 RX ADMIN — SODIUM CHLORIDE AND POTASSIUM CHLORIDE: .9; .15 SOLUTION INTRAVENOUS at 12:10

## 2024-10-01 RX ADMIN — ONDANSETRON 8 MG: 2 INJECTION INTRAMUSCULAR; INTRAVENOUS at 09:10

## 2024-10-01 RX ADMIN — LEVOFLOXACIN 500 MG: 500 TABLET, FILM COATED ORAL at 09:10

## 2024-10-01 RX ADMIN — LOPERAMIDE HYDROCHLORIDE 2 MG: 2 CAPSULE ORAL at 08:10

## 2024-10-01 RX ADMIN — Medication 1 DOSE: at 05:10

## 2024-10-01 RX ADMIN — PROCHLORPERAZINE EDISYLATE 5 MG: 5 INJECTION INTRAMUSCULAR; INTRAVENOUS at 11:10

## 2024-10-01 RX ADMIN — Medication: at 08:10

## 2024-10-01 RX ADMIN — TRAZODONE HYDROCHLORIDE 50 MG: 50 TABLET ORAL at 09:10

## 2024-10-01 RX ADMIN — Medication 400 MG: at 02:10

## 2024-10-01 RX ADMIN — CARVEDILOL 6.25 MG: 6.25 TABLET, FILM COATED ORAL at 08:10

## 2024-10-01 RX ADMIN — PROCHLORPERAZINE EDISYLATE 5 MG: 5 INJECTION INTRAMUSCULAR; INTRAVENOUS at 12:10

## 2024-10-01 RX ADMIN — URSODIOL 300 MG: 300 CAPSULE ORAL at 08:10

## 2024-10-01 RX ADMIN — Medication 6 MG: at 08:10

## 2024-10-01 RX ADMIN — Medication 1 DOSE: at 09:10

## 2024-10-01 RX ADMIN — SODIUM CHLORIDE AND POTASSIUM CHLORIDE: .9; .15 SOLUTION INTRAVENOUS at 10:10

## 2024-10-01 RX ADMIN — LETERMOVIR 480 MG: 480 TABLET, FILM COATED ORAL at 09:10

## 2024-10-01 RX ADMIN — CHOLESTYRAMINE 4 G: 4 POWDER, FOR SUSPENSION ORAL at 08:10

## 2024-10-01 RX ADMIN — LOPERAMIDE HYDROCHLORIDE 2 MG: 2 CAPSULE ORAL at 12:10

## 2024-10-01 RX ADMIN — PROCHLORPERAZINE EDISYLATE 5 MG: 5 INJECTION INTRAMUSCULAR; INTRAVENOUS at 06:10

## 2024-10-01 NOTE — SUBJECTIVE & OBJECTIVE
Subjective:     Interval History: Day +12 from a  TBI PT Cy haplo (son) BMT for MDS. Remains afebrile. VSS. No evidence of aGVHD. Repleting mag. Main complaints are persistent diarrhea and hemorrhoid pain and perianal excoriation that is worsened by diarrhea. Receiving scheduled imodium and PRN limotil.  Adding Questran in the hopes of bulking stools. Continuing LETs and Tucks. Barrier cream provided.    Objective:     Vital Signs (Most Recent):  Temp: 98.1 °F (36.7 °C) (10/01/24 1151)  Pulse: 104 (10/01/24 1151)  Resp: 20 (10/01/24 1151)  BP: 139/88 (10/01/24 1151)  SpO2: 96 % (10/01/24 1151) Vital Signs (24h Range):  Temp:  [97.8 °F (36.6 °C)-98.3 °F (36.8 °C)] 98.1 °F (36.7 °C)  Pulse:  [] 104  Resp:  [16-20] 20  SpO2:  [90 %-96 %] 96 %  BP: (135-145)/(71-88) 139/88     Weight: 74.3 kg (163 lb 12.8 oz)  Body mass index is 24.19 kg/m².  Body surface area is 1.9 meters squared.    ECOG SCORE           [unfilled]    Intake/Output - Last 3 Shifts         09/29 0700  09/30 0659 09/30 0700  10/01 0659 10/01 0700  10/02 0659    P.O. 400 550 236    I.V. (mL/kg)       Total Intake(mL/kg) 400 (5.4) 550 (7.4) 236 (3.2)    Net +400 +550 +236           Urine Occurrence 2 x 4 x 4 x    Stool Occurrence 7 x 5 x 2 x             Physical Exam  Constitutional:       Appearance: He is well-developed.   HENT:      Head: Normocephalic and atraumatic.      Mouth/Throat:      Pharynx: No oropharyngeal exudate.      Comments: Scab to lower lip  Eyes:      General:         Right eye: No discharge.         Left eye: No discharge.      Conjunctiva/sclera: Conjunctivae normal.      Pupils: Pupils are equal, round, and reactive to light.   Cardiovascular:      Rate and Rhythm: Normal rate and regular rhythm.      Heart sounds: Normal heart sounds. No murmur heard.  Pulmonary:      Effort: Pulmonary effort is normal. No respiratory distress.      Breath sounds: Normal breath sounds. No wheezing or rales.   Abdominal:      General:  Bowel sounds are normal. There is no distension.      Palpations: Abdomen is soft.      Tenderness: There is no abdominal tenderness.   Musculoskeletal:         General: No deformity. Normal range of motion.      Cervical back: Normal range of motion and neck supple.   Skin:     General: Skin is warm and dry.      Findings: No erythema or rash.      Comments: Right chest wall vas cath. Dressing c/d/i. No sign of infection to site.   Neurological:      Mental Status: He is alert and oriented to person, place, and time.   Psychiatric:         Behavior: Behavior normal.         Thought Content: Thought content normal.         Judgment: Judgment normal.            Significant Labs:   CBC:   Recent Labs   Lab 09/30/24  0428 10/01/24  0414   WBC 0.01* 0.01*   HGB 7.9* 7.5*   HCT 22.9* 21.5*   PLT 24* 12*    and CMP:   Recent Labs   Lab 09/30/24  0428 10/01/24  0414    139   K 3.6 4.3   * 111*   CO2 22* 23   * 114*   BUN 7* 10   CREATININE 0.7 0.7   CALCIUM 8.6* 8.7   PROT 5.7* 5.6*   ALBUMIN 2.9* 2.9*   BILITOT 0.7 0.7   ALKPHOS 59 57   AST 17 15   ALT 15 14   ANIONGAP 8 5*       Diagnostic Results:  I have reviewed all pertinent imaging results/findings within the past 24 hours.

## 2024-10-01 NOTE — PLAN OF CARE
Pt is Day +12 of a Haplo Allo SCT.   POC reviewed with patient; understanding verbalized. Pt. with nonskid footwear on, bed in lowest position, and locked with bed rails up x2, family at the bedside.  Pt. instructed to call prior to getting OOB.  Pt. has call light and personal items within reach. Patient ambulates in room with stand by assist. VSS and afebrile this shift. All questions and concerns addressed at this time. Family is attentive at bedside. PO electrolyte replaced. IV drip continued at 100 ml/hr. Patient stable at this time.

## 2024-10-01 NOTE — ASSESSMENT & PLAN NOTE
- C-diff neg 9/24  - Of note, was treated empirically for c-diff with oral vanc prior to admission and is receiving oral vanc ppx while admitted.  - Continue scheduled imodium and PRN lomotil  - Starting Questran today (10/1).

## 2024-10-01 NOTE — PLAN OF CARE
Problem: Adult Inpatient Plan of Care  Goal: Plan of Care Review  Outcome: Progressing     Problem: Adult Inpatient Plan of Care  Goal: Optimal Comfort and Wellbeing  Outcome: Progressing   VSS. Denies any pain. Fluids infusing. Patient reports 3-4 Bms. No acute changes overnight. Bed alarm set and call light within reach.

## 2024-10-01 NOTE — PT/OT/SLP PROGRESS
"Physical Therapy Treatment    Patient Name:  Guillaume Salinas   MRN:  [id number]    Recommendations:     Discharge Recommendations: No Therapy Indicated  Discharge Equipment Recommendations: none  Barriers to discharge: None    Assessment:     Guillaume Salinas is a 69 y.o. male admitted with a medical diagnosis of S/P allogeneic bone marrow transplant.  He presents with the following impairments/functional limitations: weakness, impaired endurance, gait instability requiring sup assistance and verbal cues for bed mob, sit < > stand transitions, and gait to prevent falls due to weakness.   Pt remains motivated to participate in PT session and will cont to benefit from skilled PT intervention..  .    Rehab Prognosis: Good; patient would benefit from acute skilled PT services to address these deficits and reach maximum level of function.    Recent Surgery: * No surgery found *      Plan:     During this hospitalization, patient to be seen 2 x/week to address the identified rehab impairments via gait training, therapeutic activities, therapeutic exercises, neuromuscular re-education and progress toward the following goals:    Plan of Care Expires:  10/15/24    Subjective     Chief Complaint: weakness, fatigue  Pain/Comfort:  Pain Rating 1: 0/10  Pain Rating Post-Intervention 1: 0/10      Objective:     Communicated with nurse (Hannah) prior to session "His platelet count is 12".  Patient found  L side lying with HOB elevated  with bed alarm, central line (wife present) upon PT entry to room.     General Precautions: Standard, fall  Orthopedic Precautions: N/A  Braces: N/A  Respiratory Status: Room air     Functional Mobility:  Bed Mobility:     Rolling Right: supervision  Scooting: supervision  Supine to Sit: supervision  Sit to Supine: supervision  Transfers:     Sit to Stand:  SBA from EOB with no AD  Gait: no AD/IV pole SBA 188ft in hallway (including turns to the R and to the L).  Pt demonstrates " decreased B step length  Balance: static standing with SBA      AM-PAC 6 CLICK MOBILITY  Turning over in bed (including adjusting bedclothes, sheets and blankets)?: 4  Sitting down on and standing up from a chair with arms (e.g., wheelchair, bedside commode, etc.): 3  Moving from lying on back to sitting on the side of the bed?: 3  Moving to and from a bed to a chair (including a wheelchair)?: 3  Need to walk in hospital room?: 3  Climbing 3-5 steps with a railing?: 3  Basic Mobility Total Score: 19       Treatment & Education:  Patient provided with daily orientation and goals of this PT session. They were educated to call for assistance and to transfer with hospital staff only.  Also, pt was educated on the effects of prolonged immobility and the importance of performing OOB activity and exercises to promote healing and reduce recovery time    Patient left HOB elevated with all lines intact, call button in reach, bed alarm on, nurse notified, and wife present..    GOALS:   Multidisciplinary Problems       Physical Therapy Goals          Problem: Physical Therapy    Goal Priority Disciplines Outcome Interventions   Physical Therapy Goal     PT, PT/OT Progressing    Description: Goals to be met by: 10/15/24     Patient will increase functional independence with mobility by performin. Supine to sit with Dane  2. Sit to supine with Dane  3. Sit to stand transfer with Dane  4. Bed to chair transfer with Dane using No Assistive Device  5. Gait  x 500 feet with Dane using No Assistive Device.   6. Lower extremity exercise program x15 reps per handout, with assistance as needed                         Time Tracking:     PT Received On: 10/01/24  PT Start Time: 1109     PT Stop Time: 1125  PT Total Time (min): 16 min     Billable Minutes: Gait Training 16    Treatment Type: Treatment  PT/PTA: PTA     Number of PTA visits since last PT visit: 3     10/01/2024

## 2024-10-01 NOTE — ASSESSMENT & PLAN NOTE
Nutrition consulted. Most recent weight and BMI monitored-     Measurements:  Wt Readings from Last 1 Encounters:   10/01/24 74.3 kg (163 lb 12.8 oz)   Body mass index is 24.19 kg/m².    Patient has been screened and assessed by RD.    - Switched boost plus to boost breeze as diary of plus making patient nauseous  - continue IVF; rate increased to 100ml/hr  - PO intake as tolerated; on scheduled antiemetics for nausea

## 2024-10-01 NOTE — PROGRESS NOTES
Met with patient for psychosocial check-in with aide of Japanese , Adriana ID#[id number], via telephone. Patient agreed to consent to meet with me and confirmed his identity via two identifiers. Patient noted he had pain yesterday but is feeling better overall today. He noted he has been sleeping better recently. He noted his mood in general has been good and denied any anxiety. He also denied any SI/HI. He shared his family helps him cope. He requested for me to return next week to assess his coping at that time. He also noted he is unsure of how much longer he will stay in the hospital and requested I check on this for him. I agreed to message one of his providers to discuss this with him. Patient noted appreciation for this. I will attempt to meet with patient in about one week for psychosocial check-in.      Giles Magana Psy.D.  Clinical Psychologist  [place] License #[id number]  [place] License #[id number]

## 2024-10-01 NOTE — ASSESSMENT & PLAN NOTE
- Patient of Dr. 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 Donor is A+.  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 Patient will start letermovir ppx on 9/24/24.  - Received fresh BMT product on 9/19/24 with a CD34 dose of 3.55 x10^6.  - Of note, cilostazol may interact with tacro. (Currently on hold for plts < 50K).  - See treatment plan below:    Planned conditioning regimen:  Fludarabine on Day -5, -4, -3, and -2  Melphalan on Day -2  TBI on Day -1     Planned  GVHD Prophylaxis:  Cyclophosphamide (with Mesna) on Days +3 and +4  Mycophenolate starting on Day +5  Tacrolimus starting on Day +5     Antimicrobial Prophylaxis:  Acyclovir starting on Day -5  Levofloxacin starting on Day -1  Micafungin on Day -1 through Day +4  Letermovir starting on Day +5 (if CMV PCR drawn on day 0 results negative)  Posaconazole starting on Day +5  Bactrim starting on Day +30     SOS/VOD Prophylaxis:  Ursodiol on Day -5 through Day +30      Growth Factor Support:  Neupogen starting on Day +5     Caregiver: wife   Post-transplant discharge plans: home

## 2024-10-01 NOTE — ASSESSMENT & PLAN NOTE
From Dr Hickey's clinic note 8/5:  Stem cell transplant candidate: We discussed the role for allogeneic stem cell transplantation in this disease process as a potentially curative option. We had an extensive discussion about the rationale, logistics, risks, and benefits. We reviewed the requirement to stay in the [place] area for 100 days with a caregiver at all times. We discussed the risks, including infection, graft failure, organ toxicity, graft versus host disease, relapse of disease, and secondary cancers. We reviewed the need for long-term immunosuppression and need for close monitoring. HCT-CI of 1 (intermediate risk). We had a prolonged discussion today regarding his recent deconditioning, Cdiff, and underlying disease. He is now much improved since last seen, and is improving his strength since starting to work with PT. Diarrhea now controlled. We discussed that our plan to move forward with the transplant process.   Coordinator: Fay Stephens  Regimen:  + 2Gy TBI  Donor: son (haplo)   Graft source: BM  - Admitted 9/13/24 for a  TBI PT Cy haplo (son) allogeneic BMT

## 2024-10-02 LAB
ABO + RH BLD: NORMAL
ALBUMIN SERPL BCP-MCNC: 2.8 G/DL (ref 3.5–5.2)
ALP SERPL-CCNC: 57 U/L (ref 55–135)
ALT SERPL W/O P-5'-P-CCNC: 12 U/L (ref 10–44)
ANION GAP SERPL CALC-SCNC: 7 MMOL/L (ref 8–16)
ANISOCYTOSIS BLD QL SMEAR: SLIGHT
AST SERPL-CCNC: 13 U/L (ref 10–40)
BASOPHILS # BLD AUTO: 0 K/UL (ref 0–0.2)
BASOPHILS NFR BLD: 0 % (ref 0–1.9)
BILIRUB SERPL-MCNC: 0.6 MG/DL (ref 0.1–1)
BLD GP AB SCN CELLS X3 SERPL QL: NORMAL
BLD PROD TYP BPU: NORMAL
BLOOD UNIT EXPIRATION DATE: NORMAL
BLOOD UNIT TYPE CODE: 6200
BLOOD UNIT TYPE: NORMAL
BUN SERPL-MCNC: 10 MG/DL (ref 8–23)
CALCIUM SERPL-MCNC: 8.8 MG/DL (ref 8.7–10.5)
CHLORIDE SERPL-SCNC: 108 MMOL/L (ref 95–110)
CO2 SERPL-SCNC: 22 MMOL/L (ref 23–29)
CODING SYSTEM: NORMAL
CREAT SERPL-MCNC: 0.7 MG/DL (ref 0.5–1.4)
CROSSMATCH INTERPRETATION: NORMAL
DIFFERENTIAL METHOD BLD: ABNORMAL
DISPENSE STATUS: NORMAL
EOSINOPHIL # BLD AUTO: 0 K/UL (ref 0–0.5)
EOSINOPHIL NFR BLD: 0 % (ref 0–8)
ERYTHROCYTE [DISTWIDTH] IN BLOOD BY AUTOMATED COUNT: 12.6 % (ref 11.5–14.5)
EST. GFR  (NO RACE VARIABLE): >60 ML/MIN/1.73 M^2
GLUCOSE SERPL-MCNC: 111 MG/DL (ref 70–110)
HCT VFR BLD AUTO: 20.4 % (ref 40–54)
HGB BLD-MCNC: 7.1 G/DL (ref 14–18)
IMM GRANULOCYTES # BLD AUTO: 0 K/UL (ref 0–0.04)
IMM GRANULOCYTES NFR BLD AUTO: 0 % (ref 0–0.5)
LACTATE SERPL-SCNC: 1.1 MMOL/L (ref 0.5–2.2)
LYMPHOCYTES # BLD AUTO: 0 K/UL (ref 1–4.8)
LYMPHOCYTES NFR BLD: 0 % (ref 18–48)
MAGNESIUM SERPL-MCNC: 1.3 MG/DL (ref 1.6–2.6)
MCH RBC QN AUTO: 30.3 PG (ref 27–31)
MCHC RBC AUTO-ENTMCNC: 34.8 G/DL (ref 32–36)
MCV RBC AUTO: 87 FL (ref 82–98)
MONOCYTES # BLD AUTO: 0 K/UL (ref 0.3–1)
MONOCYTES NFR BLD: 0 % (ref 4–15)
NEUTROPHILS # BLD AUTO: 0 K/UL (ref 1.8–7.7)
NEUTROPHILS NFR BLD: 100 % (ref 38–73)
NRBC BLD-RTO: 0 /100 WBC
OVALOCYTES BLD QL SMEAR: ABNORMAL
PHOSPHATE SERPL-MCNC: 3 MG/DL (ref 2.7–4.5)
PLATELET # BLD AUTO: 5 K/UL (ref 150–450)
PLATELET BLD QL SMEAR: ABNORMAL
PMV BLD AUTO: 12.1 FL (ref 9.2–12.9)
POIKILOCYTOSIS BLD QL SMEAR: SLIGHT
POLYCHROMASIA BLD QL SMEAR: ABNORMAL
POTASSIUM SERPL-SCNC: 4.2 MMOL/L (ref 3.5–5.1)
PROT SERPL-MCNC: 5.3 G/DL (ref 6–8.4)
RBC # BLD AUTO: 2.34 M/UL (ref 4.6–6.2)
SODIUM SERPL-SCNC: 137 MMOL/L (ref 136–145)
SPECIMEN OUTDATE: NORMAL
TACROLIMUS BLD-MCNC: 9.7 NG/ML (ref 5–15)
UNIT NUMBER: NORMAL
WBC # BLD AUTO: 0.01 K/UL (ref 3.9–12.7)

## 2024-10-02 PROCEDURE — 80197 ASSAY OF TACROLIMUS: CPT | Performed by: INTERNAL MEDICINE

## 2024-10-02 PROCEDURE — P9037 PLATE PHERES LEUKOREDU IRRAD: HCPCS | Performed by: STUDENT IN AN ORGANIZED HEALTH CARE EDUCATION/TRAINING PROGRAM

## 2024-10-02 PROCEDURE — 87086 URINE CULTURE/COLONY COUNT: CPT | Performed by: STUDENT IN AN ORGANIZED HEALTH CARE EDUCATION/TRAINING PROGRAM

## 2024-10-02 PROCEDURE — 63600175 PHARM REV CODE 636 W HCPCS: Performed by: NURSE PRACTITIONER

## 2024-10-02 PROCEDURE — 86900 BLOOD TYPING SEROLOGIC ABO: CPT | Performed by: INTERNAL MEDICINE

## 2024-10-02 PROCEDURE — 25000003 PHARM REV CODE 250: Performed by: INTERNAL MEDICINE

## 2024-10-02 PROCEDURE — 86901 BLOOD TYPING SEROLOGIC RH(D): CPT | Performed by: INTERNAL MEDICINE

## 2024-10-02 PROCEDURE — 84100 ASSAY OF PHOSPHORUS: CPT | Performed by: NURSE PRACTITIONER

## 2024-10-02 PROCEDURE — 63600175 PHARM REV CODE 636 W HCPCS: Performed by: STUDENT IN AN ORGANIZED HEALTH CARE EDUCATION/TRAINING PROGRAM

## 2024-10-02 PROCEDURE — 25000003 PHARM REV CODE 250: Performed by: NURSE PRACTITIONER

## 2024-10-02 PROCEDURE — 20600001 HC STEP DOWN PRIVATE ROOM

## 2024-10-02 PROCEDURE — 83735 ASSAY OF MAGNESIUM: CPT | Performed by: NURSE PRACTITIONER

## 2024-10-02 PROCEDURE — 86850 RBC ANTIBODY SCREEN: CPT | Performed by: INTERNAL MEDICINE

## 2024-10-02 PROCEDURE — 97535 SELF CARE MNGMENT TRAINING: CPT | Mod: CO

## 2024-10-02 PROCEDURE — 80053 COMPREHEN METABOLIC PANEL: CPT | Performed by: NURSE PRACTITIONER

## 2024-10-02 PROCEDURE — 83605 ASSAY OF LACTIC ACID: CPT | Performed by: NURSE PRACTITIONER

## 2024-10-02 PROCEDURE — 36430 TRANSFUSION BLD/BLD COMPNT: CPT

## 2024-10-02 PROCEDURE — 85025 COMPLETE CBC W/AUTO DIFF WBC: CPT | Performed by: NURSE PRACTITIONER

## 2024-10-02 PROCEDURE — 97530 THERAPEUTIC ACTIVITIES: CPT | Mod: CO

## 2024-10-02 PROCEDURE — 63600175 PHARM REV CODE 636 W HCPCS: Performed by: INTERNAL MEDICINE

## 2024-10-02 RX ORDER — TRAMADOL HYDROCHLORIDE 50 MG/1
50 TABLET ORAL EVERY 6 HOURS PRN
Status: DISCONTINUED | OUTPATIENT
Start: 2024-10-02 | End: 2024-10-15

## 2024-10-02 RX ORDER — HYDROCODONE BITARTRATE AND ACETAMINOPHEN 500; 5 MG/1; MG/1
TABLET ORAL
Status: DISCONTINUED | OUTPATIENT
Start: 2024-10-02 | End: 2024-10-03

## 2024-10-02 RX ORDER — MAGNESIUM SULFATE HEPTAHYDRATE 40 MG/ML
2 INJECTION, SOLUTION INTRAVENOUS
Status: COMPLETED | OUTPATIENT
Start: 2024-10-02 | End: 2024-10-02

## 2024-10-02 RX ADMIN — MAGNESIUM SULFATE HEPTAHYDRATE 2 G: 40 INJECTION, SOLUTION INTRAVENOUS at 09:10

## 2024-10-02 RX ADMIN — MAGNESIUM SULFATE HEPTAHYDRATE 2 G: 40 INJECTION, SOLUTION INTRAVENOUS at 11:10

## 2024-10-02 RX ADMIN — CHOLESTYRAMINE 4 G: 4 POWDER, FOR SUSPENSION ORAL at 08:10

## 2024-10-02 RX ADMIN — Medication 800 MG: at 06:10

## 2024-10-02 RX ADMIN — Medication 1 DOSE: at 08:10

## 2024-10-02 RX ADMIN — TRAZODONE HYDROCHLORIDE 50 MG: 50 TABLET ORAL at 09:10

## 2024-10-02 RX ADMIN — TAMSULOSIN HYDROCHLORIDE 0.4 MG: 0.4 CAPSULE ORAL at 09:10

## 2024-10-02 RX ADMIN — TACROLIMUS 1 MG: 1 CAPSULE ORAL at 07:10

## 2024-10-02 RX ADMIN — PROCHLORPERAZINE EDISYLATE 5 MG: 5 INJECTION INTRAMUSCULAR; INTRAVENOUS at 11:10

## 2024-10-02 RX ADMIN — PROCHLORPERAZINE EDISYLATE 5 MG: 5 INJECTION INTRAMUSCULAR; INTRAVENOUS at 06:10

## 2024-10-02 RX ADMIN — Medication 800 MG: at 10:10

## 2024-10-02 RX ADMIN — Medication 1 DOSE: at 12:10

## 2024-10-02 RX ADMIN — LETERMOVIR 480 MG: 480 TABLET, FILM COATED ORAL at 09:10

## 2024-10-02 RX ADMIN — TRAMADOL HYDROCHLORIDE 50 MG: 50 TABLET, COATED ORAL at 04:10

## 2024-10-02 RX ADMIN — MYCOPHENOLATE MOFETIL 1000 MG: 250 CAPSULE ORAL at 09:10

## 2024-10-02 RX ADMIN — GABAPENTIN 600 MG: 300 CAPSULE ORAL at 09:10

## 2024-10-02 RX ADMIN — PANTOPRAZOLE SODIUM 40 MG: 40 TABLET, DELAYED RELEASE ORAL at 09:10

## 2024-10-02 RX ADMIN — Medication 1 DOSE: at 09:10

## 2024-10-02 RX ADMIN — FILGRASTIM 300 MCG: 300 INJECTION, SOLUTION INTRAVENOUS; SUBCUTANEOUS at 09:10

## 2024-10-02 RX ADMIN — TACROLIMUS 1 MG: 1 CAPSULE ORAL at 05:10

## 2024-10-02 RX ADMIN — DIPHENHYDRAMINE HYDROCHLORIDE 50 MG: 25 CAPSULE ORAL at 09:10

## 2024-10-02 RX ADMIN — ONDANSETRON 8 MG: 2 INJECTION INTRAMUSCULAR; INTRAVENOUS at 09:10

## 2024-10-02 RX ADMIN — LOPERAMIDE HYDROCHLORIDE 2 MG: 2 CAPSULE ORAL at 05:10

## 2024-10-02 RX ADMIN — CARVEDILOL 6.25 MG: 6.25 TABLET, FILM COATED ORAL at 09:10

## 2024-10-02 RX ADMIN — ONDANSETRON 8 MG: 2 INJECTION INTRAMUSCULAR; INTRAVENOUS at 06:10

## 2024-10-02 RX ADMIN — Medication: at 08:10

## 2024-10-02 RX ADMIN — SODIUM CHLORIDE AND POTASSIUM CHLORIDE: .9; .15 SOLUTION INTRAVENOUS at 04:10

## 2024-10-02 RX ADMIN — MYCOPHENOLATE MOFETIL 1000 MG: 250 CAPSULE ORAL at 08:10

## 2024-10-02 RX ADMIN — Medication 6 MG: at 08:10

## 2024-10-02 RX ADMIN — GABAPENTIN 600 MG: 300 CAPSULE ORAL at 08:10

## 2024-10-02 RX ADMIN — LOPERAMIDE HYDROCHLORIDE 2 MG: 2 CAPSULE ORAL at 09:10

## 2024-10-02 RX ADMIN — CARVEDILOL 6.25 MG: 6.25 TABLET, FILM COATED ORAL at 08:10

## 2024-10-02 RX ADMIN — LEVOFLOXACIN 500 MG: 500 TABLET, FILM COATED ORAL at 09:10

## 2024-10-02 RX ADMIN — LIDOCAINE 5% 1 PATCH: 700 PATCH TOPICAL at 09:10

## 2024-10-02 RX ADMIN — HYDROCORTISONE: 25 CREAM TOPICAL at 09:10

## 2024-10-02 RX ADMIN — Medication 1 DOSE: at 05:10

## 2024-10-02 RX ADMIN — VANCOMYCIN HYDROCHLORIDE 125 MG: KIT at 09:10

## 2024-10-02 RX ADMIN — ACYCLOVIR 800 MG: 200 CAPSULE ORAL at 08:10

## 2024-10-02 RX ADMIN — Medication 800 MG: at 02:10

## 2024-10-02 RX ADMIN — URSODIOL 300 MG: 300 CAPSULE ORAL at 09:10

## 2024-10-02 RX ADMIN — SODIUM CHLORIDE AND POTASSIUM CHLORIDE: .9; .15 SOLUTION INTRAVENOUS at 02:10

## 2024-10-02 RX ADMIN — ACYCLOVIR 800 MG: 200 CAPSULE ORAL at 09:10

## 2024-10-02 RX ADMIN — LOPERAMIDE HYDROCHLORIDE 2 MG: 2 CAPSULE ORAL at 08:10

## 2024-10-02 RX ADMIN — POSACONAZOLE 300 MG: 100 TABLET, DELAYED RELEASE ORAL at 09:10

## 2024-10-02 RX ADMIN — Medication: at 09:10

## 2024-10-02 RX ADMIN — PROCHLORPERAZINE EDISYLATE 5 MG: 5 INJECTION INTRAMUSCULAR; INTRAVENOUS at 05:10

## 2024-10-02 RX ADMIN — CHOLESTYRAMINE 4 G: 4 POWDER, FOR SUSPENSION ORAL at 09:10

## 2024-10-02 RX ADMIN — LOPERAMIDE HYDROCHLORIDE 2 MG: 2 CAPSULE ORAL at 12:10

## 2024-10-02 RX ADMIN — URSODIOL 300 MG: 300 CAPSULE ORAL at 08:10

## 2024-10-02 RX ADMIN — MYCOPHENOLATE MOFETIL 1000 MG: 250 CAPSULE ORAL at 02:10

## 2024-10-02 RX ADMIN — ONDANSETRON 8 MG: 2 INJECTION INTRAMUSCULAR; INTRAVENOUS at 02:10

## 2024-10-02 NOTE — PROGRESS NOTES
Janusz Ma - Oncology ([facility])  Hematology  Bone Marrow Transplant  Progress Note    Patient Name: Guillaume Salinas  Admission Date: 9/13/2024  Hospital Length of Stay: 19 days  Code Status: Full Code    Subjective:     Interval History: Day +13 from a  TBI PT Cy haplo (son) BMT for MDS. He didn't have any bowel movement last night. He complains of dysuria, frequency and urgency. UA ordered. He denies any abdominal pain. No fever. VSS. His left shoulder pain is better. Transfusing 1 unit of platelet to keep >10. He endorses decreased appetite.    Objective:     Vital Signs (Most Recent):  Temp: 98.1 °F (36.7 °C) (10/02/24 0750)  Pulse: 96 (10/02/24 0750)  Resp: 18 (10/02/24 0750)  BP: (!) 151/78 (10/02/24 0750)  SpO2: (!) 94 % (10/02/24 0750) Vital Signs (24h Range):  Temp:  [96.3 °F (35.7 °C)-98.3 °F (36.8 °C)] 98.1 °F (36.7 °C)  Pulse:  [] 96  Resp:  [18-20] 18  SpO2:  [94 %-97 %] 94 %  BP: (133-155)/(72-88) 151/78     Weight: 75 kg (165 lb 5.5 oz)  Body mass index is 24.42 kg/m².  Body surface area is 1.91 meters squared.    ECOG SCORE           [unfilled]    Intake/Output - Last 3 Shifts         09/30 0700  10/01 0659 10/01 0700  10/02 0659 10/02 0700  10/03 0659    P.O. 550 604     I.V. (mL/kg)  1105.1 (14.7)     Total Intake(mL/kg) 550 (7.4) 1709.1 (22.8)     Urine (mL/kg/hr)  1600 (0.9)     Emesis/NG output  300     Stool  0     Total Output  1900     Net +550 -191            Urine Occurrence 4 x 4 x     Stool Occurrence 5 x 3 x              Physical Exam  Constitutional:       Appearance: He is well-developed.   HENT:      Head: Normocephalic and atraumatic.      Mouth/Throat:      Pharynx: No oropharyngeal exudate.      Comments: Scab to lower lip  Eyes:      General:         Right eye: No discharge.         Left eye: No discharge.      Conjunctiva/sclera: Conjunctivae normal.      Pupils: Pupils are equal, round, and reactive to light.   Cardiovascular:      Rate and Rhythm: Normal rate  and regular rhythm.      Heart sounds: Normal heart sounds. No murmur heard.  Pulmonary:      Effort: Pulmonary effort is normal. No respiratory distress.      Breath sounds: Normal breath sounds. No wheezing or rales.   Abdominal:      General: Bowel sounds are normal. There is no distension.      Palpations: Abdomen is soft.      Tenderness: There is no abdominal tenderness.   Musculoskeletal:         General: No deformity. Normal range of motion.      Cervical back: Normal range of motion and neck supple.   Skin:     General: Skin is warm and dry.      Findings: No erythema or rash.      Comments: Right chest wall vas cath. Dressing c/d/i. No sign of infection to site.   Neurological:      Mental Status: He is alert and oriented to person, place, and time.   Psychiatric:         Behavior: Behavior normal.         Thought Content: Thought content normal.         Judgment: Judgment normal.            Significant Labs:   All pertinent labs from the last 24 hours have been reviewed.    Diagnostic Results:  I have reviewed and interpreted all pertinent imaging results/findings within the past 24 hours.  Assessment/Plan:     * S/P allogeneic bone marrow transplant  - Patient of Dr. 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 Donor is A+.  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 Patient will start letermovir ppx on 9/24/24.  - Received fresh BMT product on 9/19/24 with a CD34 dose of 3.55 x10^6.  - Of note, cilostazol may interact with tacro. (Currently on hold for plts < 50K).  - See treatment plan below:    Planned conditioning regimen:  Fludarabine on Day -5, -4, -3, and -2  Melphalan on Day -2  TBI on Day -1     Planned  GVHD Prophylaxis:  Cyclophosphamide (with [place]) on Days +3 and  +4  Mycophenolate starting on Day +5  Tacrolimus starting on Day +5     Antimicrobial Prophylaxis:  Acyclovir starting on Day -5  Levofloxacin starting on Day -1  Micafungin on Day -1 through Day +4  Letermovir starting on Day +5 (if CMV PCR drawn on day 0 results negative)  Posaconazole starting on Day +5  Bactrim starting on Day +30     SOS/VOD Prophylaxis:  Ursodiol on Day -5 through Day +30      Growth Factor Support:  Neupogen starting on Day +5     Caregiver: wife   Post-transplant discharge plans: home        Hypomagnesemia  - See electrolyte disorder  - Tacro likely contributing.    Electrolyte disorder  - Replete per PRN electrolyte order set  - Daily CMP, mag, and phos while inpatient    Moderate malnutrition  Nutrition consulted. Most recent weight and BMI monitored-     Measurements:  Wt Readings from Last 1 Encounters:   10/01/24 74.3 kg (163 lb 12.8 oz)   Body mass index is 24.19 kg/m².    Patient has been screened and assessed by RD.    - Switched boost plus to boost breeze as diary of plus making patient nauseous  - continue IVF; rate increased to 100ml/hr  - PO intake as tolerated; on scheduled antiemetics for nausea      Dry mouth  - continue to moisten lips with chapstick  - encouraged use of oral hygiene rinses to sterilize mouth and stimulate saliva production    Urinary frequency  - reports increased nightly frequency and urgency with low output  - no formal BPH diagnosis; started flomax 9/27  - monitor for improvement (can increase dose if not effective)    Chemotherapy-induced nausea  - scheduled zofran IV 8mg q8h on 9/27; added scheduled compazine 9/30  - has prn ativan    Hemorrhoids  - d/t chemo induced diarrhea  - has anusol, tucks pads, and LETS gel  - patient denies any bleeding at site  - has PRN morphine for pain control    Headache  - C/o headache 9/25  - Daily coffee drinker. Improved after drinking coffee.  - Suspect caffeine withdrawal.  - Can start caffeine tablet if patient is  unable to drink coffee due to chemo side effects  - none today    Chemotherapy induced diarrhea  - C-diff neg 9/24  - Of note, was treated empirically for c-diff with oral vanc prior to admission and is receiving oral vanc ppx while admitted.  - Continue scheduled imodium and PRN lomotil  - Starting Questran today (10/1).  -Diarrhea better. He didn't have any bowel movement last night.    Neutropenic fever  - First fever with tmax 101.8F on 9/23  - Infection w/u unremarkable thus far  - No fevers since 9/23, Cefepime stopped on 9/25 and back on oral ppx LVQ.  RESOLVED    Insomnia  - has PRN Trazodone however not currently using  - started on scheduled melatonin    History of Clostridioides difficile infection  - Was treated empirically for c-diff with oral vanc prior to admission.  - Continue oral vanc ppx per transplant protocol      Neuropathy  - Continue home gabapentin    Pancytopenia due to antineoplastic chemotherapy  - Daily CBC while inpatient  - Transfuse for hgb <7 Plt  or <10K  - Continue antimicrobial ppx  - Holding cilostazol for plts < 50K    Myelodysplastic syndrome  From Dr Hickey's clinic note 8/5:  Stem cell transplant candidate: We discussed the role for allogeneic stem cell transplantation in this disease process as a potentially curative option. We had an extensive discussion about the rationale, logistics, risks, and benefits. We reviewed the requirement to stay in the [place] area for 100 days with a caregiver at all times. We discussed the risks, including infection, graft failure, organ toxicity, graft versus host disease, relapse of disease, and secondary cancers. We reviewed the need for long-term immunosuppression and need for close monitoring. HCT-CI of 1 (intermediate risk). We had a prolonged discussion today regarding his recent deconditioning, Cdiff, and underlying disease. He is now much improved since last seen, and is improving his strength since starting to work with PT.  Diarrhea now controlled. We discussed that our plan to move forward with the transplant process.   Coordinator: Fay Stephens  Regimen:  + 2Gy TBI  Donor: son (haplo)   Graft source: BM  - Admitted 9/13/24 for a  TBI PT Cy haplo (son) allogeneic BMT    Hypertension, essential  - Was holding home Coreg for fluid responsive hypotension. Resumed 9/30.    Coronary artery disease involving native coronary artery of native heart without angina pectoris  - Was holding home Coreg for hypotension. Resumed 9/30.        VTE Risk Mitigation (From admission, onward)           Ordered     heparin, porcine (PF) 100 unit/mL injection flush 300 Units  As needed (PRN)         09/13/24 8116                    Disposition: TBD    Celso Baig MD  Bone Marrow Transplant  [facility] - Oncology ([facility])

## 2024-10-02 NOTE — PT/OT/SLP PROGRESS
Occupational Therapy   Treatment    Name: Guillaume Salinas  MRN: [id number]  Admitting Diagnosis:  S/P allogeneic bone marrow transplant       Recommendations:     Discharge Recommendations: No Therapy Indicated  Discharge Equipment Recommendations:  none  Barriers to discharge:  None    Assessment:     Guillaume Salinas is a 69 y.o. male with a medical diagnosis of S/P allogeneic bone marrow transplant.  He presents with the following performance deficits affecting function are weakness, impaired endurance, impaired functional mobility, gait instability, impaired self care skills. Patient cleared for therapy by nurse post patient receiving platelet transfusion 30 min prior. Patient remained asymptomatic throughout OT session. Patient goals remain appropriate at this time. Continue OT POC    Rehab Prognosis:  Good; patient would benefit from acute skilled OT services to address these deficits and reach maximum level of function.       Plan:     Patient to be seen 2 x/week to address the above listed problems via self-care/home management, therapeutic activities, therapeutic exercises, neuromuscular re-education  Plan of Care Expires: 10/15/24  Plan of Care Reviewed with: patient, spouse    Subjective     Chief Complaint: c/o elbow soreness  Patient/Family Comments/goals: patient agreeable to therapy.   Pain/Comfort:  Pain Rating 1: 0/10  Pain Rating Post-Intervention 1: 0/10    Objective:     Communicated with: nurse russo prior to session.  Patient found HOB elevated with central line upon OT entry to room.     (Ecuadorean): via [employer] [place] #[id number]    General Precautions: Standard, fall    Orthopedic Precautions:N/A  Braces: N/A  Respiratory Status: Room air     Occupational Performance:     Bed Mobility:    Patient completed Scooting/Bridging with supervision  Patient completed Supine to Sit with contact guard assistance, with side rail, and HOB elevated  Patient completed Sit to Supine  with stand by assistance, with side rail, and HOB flat      Functional Mobility/Transfers:  Patient completed Sit <> Stand Transfer with contact guard assistance  with  no assistive device from EOB  From toilet: SBA, no AD  Patient completed Toilet Transfer Step Transfer technique with stand by assistance with  no AD  Functional Mobility: to/from bathroom 12 ft x2 with CGA, no AD    Activities of Daily Living:  Grooming: stand by assistance hand hygiene standing sink side  Upper Body Dressing: minimum assistance don and don clean gown seated EOB  Lower Body Dressing: contact guard assistance don B slippers seated EOB  Toileting: stand by assistance gown management to void seated       AMPAC 6 Click ADL: 20    Treatment & Education:  Patient completed B UE AROM shoulder flexion with limited range for R UE 2/2 port, B UE elbow flexion/extension, and L elbow PROM pronation/supination, and standing marches x5 reps, seated x10 reps each. Patient edu on goals, current progress, maintaining joint/muscle integrity with prolonged bed rest, and the importance of OOB activity with nurse (A) of 1 person to prevent further debility. Addressed all patient/spouse questions/concerns within [facility] scope of practice.     Patient left with bed in chair position with all lines intact, call button in reach, and wife present, nurse notified    GOALS:   Multidisciplinary Problems       Occupational Therapy Goals          Problem: Occupational Therapy    Goal Priority Disciplines Outcome Interventions   Occupational Therapy Goal     OT, PT/OT Progressing    Description: Goals to be met by: 10/29/2024     Patient will increase functional independence with ADLs by performing:    Pt will demonstrate independence with grooming x 3 sessions.  Pt will demonstrate independence with UE dressing x 3 sessions.  Pt will demonstrate independence with LE dressing x 3 sessions.  Pt will demonstrate independence with Toileting x 3 sessions.  Pt will  demonstrate independence with transfers x 3 sessions.  Pt will complete functional mobility to simulate community distances with [place] x 3 sessions in order to maximize functional activity tolerance required for occupations of choice.   Pt will demonstrate independence with HEP for UE strengthening/endurance with independence.                           Time Tracking:     OT Date of Treatment: 10/02/24  OT Start Time: 1154  OT Stop Time: 1222  OT Total Time (min): 28 min    Billable Minutes:Self Care/Home Management 16  Therapeutic Activity 12    OT/DIMITRIS: DIMITRIS     Number of DIMITRIS visits since last OT visit: 2    10/2/2024

## 2024-10-02 NOTE — PLAN OF CARE
D+13 FluMel Haplo SCT. Patient AAOx4. Afebrile and VSS on room air. NaCl w/ K+ infusing @ 100. Patient complains of shoulder pain, PRN tramadol ordered and given x1. Patient is up with assistance to the bathroom, urinal provided and within reach. Free from falls and injury. Monitoring I&Os, has a decreased appetite, and CHG wipes given for self care. Family (daughter and wife) at bedside. Bed is locked and in lowest position and call light within reach. Plan of care reviewed with patient and family and verbalizes understanding. Educated to use the call light when needing assistance. Patient expresses no other needs at this time.

## 2024-10-02 NOTE — CLINICAL REVIEW
IP Sepsis Screen (most recent)       Sepsis Screen (IP) - 10/02/24 1314       Is the patient's history or complaint suggestive of a possible infection? Yes  -TR    Are there at least two of the following signs and symptoms present? Yes  -TR    Sepsis signs/symptoms - Tachycardia Tachycardia     >90  -TR    Sepsis signs/symptoms - WBC WBC < 4,000 or WBC > 12,000  -TR    Are any of the following organ dysfunction criteria present and not considered to be due to a chronic condition? Yes  -TR    Organ Dysfunction Criteria Total Bilirubin > 2.0 Platelet count < 100,000  -TR    Initiate Sepsis Protocol No  -TR    Reason sepsis not considered Pt. receiving appropriate management  -TR              User Key  (r) = Recorded By, (t) = Taken By, (c) = Cosigned By      Initials Name    Connie Birch RN

## 2024-10-02 NOTE — SUBJECTIVE & OBJECTIVE
Subjective:     Interval History: Day +13 from a  TBI PT Cy haplo (son) BMT for MDS. He didn't have any bowel movement last night. He complains of dysuria, frequency and urgency. UA ordered. He denies any abdominal pain. No fever. VSS. His left shoulder pain is better. Transfusing 1 unit of platelet to keep >10. He endorses decreased appetite.    Objective:     Vital Signs (Most Recent):  Temp: 98.1 °F (36.7 °C) (10/02/24 0750)  Pulse: 96 (10/02/24 0750)  Resp: 18 (10/02/24 0750)  BP: (!) 151/78 (10/02/24 0750)  SpO2: (!) 94 % (10/02/24 0750) Vital Signs (24h Range):  Temp:  [96.3 °F (35.7 °C)-98.3 °F (36.8 °C)] 98.1 °F (36.7 °C)  Pulse:  [] 96  Resp:  [18-20] 18  SpO2:  [94 %-97 %] 94 %  BP: (133-155)/(72-88) 151/78     Weight: 75 kg (165 lb 5.5 oz)  Body mass index is 24.42 kg/m².  Body surface area is 1.91 meters squared.    ECOG SCORE           [unfilled]    Intake/Output - Last 3 Shifts         09/30 0700  10/01 0659 10/01 0700  10/02 0659 10/02 0700  10/03 0659    P.O. 550 604     I.V. (mL/kg)  1105.1 (14.7)     Total Intake(mL/kg) 550 (7.4) 1709.1 (22.8)     Urine (mL/kg/hr)  1600 (0.9)     Emesis/NG output  300     Stool  0     Total Output  1900     Net +550 -191            Urine Occurrence 4 x 4 x     Stool Occurrence 5 x 3 x              Physical Exam  Constitutional:       Appearance: He is well-developed.   HENT:      Head: Normocephalic and atraumatic.      Mouth/Throat:      Pharynx: No oropharyngeal exudate.      Comments: Scab to lower lip  Eyes:      General:         Right eye: No discharge.         Left eye: No discharge.      Conjunctiva/sclera: Conjunctivae normal.      Pupils: Pupils are equal, round, and reactive to light.   Cardiovascular:      Rate and Rhythm: Normal rate and regular rhythm.      Heart sounds: Normal heart sounds. No murmur heard.  Pulmonary:      Effort: Pulmonary effort is normal. No respiratory distress.      Breath sounds: Normal breath sounds. No wheezing or  rales.   Abdominal:      General: Bowel sounds are normal. There is no distension.      Palpations: Abdomen is soft.      Tenderness: There is no abdominal tenderness.   Musculoskeletal:         General: No deformity. Normal range of motion.      Cervical back: Normal range of motion and neck supple.   Skin:     General: Skin is warm and dry.      Findings: No erythema or rash.      Comments: Right chest wall vas cath. Dressing c/d/i. No sign of infection to site.   Neurological:      Mental Status: He is alert and oriented to person, place, and time.   Psychiatric:         Behavior: Behavior normal.         Thought Content: Thought content normal.         Judgment: Judgment normal.            Significant Labs:   All pertinent labs from the last 24 hours have been reviewed.    Diagnostic Results:  I have reviewed and interpreted all pertinent imaging results/findings within the past 24 hours.

## 2024-10-02 NOTE — PLAN OF CARE
Pt is Day +13 of a Haplo Allo SCT.   POC reviewed with patient; understanding verbalized. Pt. with nonskid footwear on, bed in lowest position, and locked with bed rails up x2, family at the bedside.  Pt. instructed to call prior to getting OOB.  Pt. has call light and personal items within reach. Patient ambulates in room with stand by assist. VSS and afebrile this shift. All questions and concerns addressed at this time. Family is attentive at bedside. PO and IV electrolyte replaced. IV drip continued at 100 ml/hr. Urine c/s and lactic acid sent to the lab. 1 U plt transfused, premedicated before transfusion, tolerated well. Patient stable at this time.

## 2024-10-02 NOTE — ASSESSMENT & PLAN NOTE
- C-diff neg 9/24  - Of note, was treated empirically for c-diff with oral vanc prior to admission and is receiving oral vanc ppx while admitted.  - Continue scheduled imodium and PRN lomotil  - Starting Questran today (10/1).  -Diarrhea better. He didn't have any bowel movement last night.

## 2024-10-03 LAB
ALBUMIN SERPL BCP-MCNC: 2.5 G/DL (ref 3.5–5.2)
ALP SERPL-CCNC: 52 U/L (ref 55–135)
ALT SERPL W/O P-5'-P-CCNC: 10 U/L (ref 10–44)
ANION GAP SERPL CALC-SCNC: 6 MMOL/L (ref 8–16)
ANISOCYTOSIS BLD QL SMEAR: SLIGHT
AST SERPL-CCNC: 12 U/L (ref 10–40)
BACTERIA UR CULT: NO GROWTH
BASOPHILS # BLD AUTO: 0 K/UL (ref 0–0.2)
BASOPHILS NFR BLD: 0 % (ref 0–1.9)
BILIRUB SERPL-MCNC: 0.6 MG/DL (ref 0.1–1)
BLD PROD TYP BPU: NORMAL
BLOOD UNIT EXPIRATION DATE: NORMAL
BLOOD UNIT TYPE CODE: 7300
BLOOD UNIT TYPE: NORMAL
BUN SERPL-MCNC: 11 MG/DL (ref 8–23)
CALCIUM SERPL-MCNC: 8.1 MG/DL (ref 8.7–10.5)
CHLORIDE SERPL-SCNC: 107 MMOL/L (ref 95–110)
CO2 SERPL-SCNC: 23 MMOL/L (ref 23–29)
CODING SYSTEM: NORMAL
CREAT SERPL-MCNC: 0.7 MG/DL (ref 0.5–1.4)
CROSSMATCH INTERPRETATION: NORMAL
DIFFERENTIAL METHOD BLD: ABNORMAL
DISPENSE STATUS: NORMAL
EOSINOPHIL # BLD AUTO: 0 K/UL (ref 0–0.5)
EOSINOPHIL NFR BLD: 0 % (ref 0–8)
ERYTHROCYTE [DISTWIDTH] IN BLOOD BY AUTOMATED COUNT: 12.5 % (ref 11.5–14.5)
EST. GFR  (NO RACE VARIABLE): >60 ML/MIN/1.73 M^2
GLUCOSE SERPL-MCNC: 109 MG/DL (ref 70–110)
HCT VFR BLD AUTO: 17.1 % (ref 40–54)
HGB BLD-MCNC: 6 G/DL (ref 14–18)
IMM GRANULOCYTES # BLD AUTO: 0 K/UL (ref 0–0.04)
IMM GRANULOCYTES NFR BLD AUTO: 0 % (ref 0–0.5)
LYMPHOCYTES # BLD AUTO: 0 K/UL (ref 1–4.8)
LYMPHOCYTES NFR BLD: 0 % (ref 18–48)
MAGNESIUM SERPL-MCNC: 1.7 MG/DL (ref 1.6–2.6)
MCH RBC QN AUTO: 30.3 PG (ref 27–31)
MCHC RBC AUTO-ENTMCNC: 35.1 G/DL (ref 32–36)
MCV RBC AUTO: 86 FL (ref 82–98)
MONOCYTES # BLD AUTO: 0 K/UL (ref 0.3–1)
MONOCYTES NFR BLD: 0 % (ref 4–15)
NEUTROPHILS # BLD AUTO: 0 K/UL (ref 1.8–7.7)
NEUTROPHILS NFR BLD: 100 % (ref 38–73)
NRBC BLD-RTO: 0 /100 WBC
NUM UNITS TRANS PACKED RBC: NORMAL
OVALOCYTES BLD QL SMEAR: ABNORMAL
PHOSPHATE SERPL-MCNC: 2.8 MG/DL (ref 2.7–4.5)
PLATELET # BLD AUTO: 17 K/UL (ref 150–450)
PLATELET BLD QL SMEAR: ABNORMAL
PMV BLD AUTO: 9.8 FL (ref 9.2–12.9)
POIKILOCYTOSIS BLD QL SMEAR: SLIGHT
POTASSIUM SERPL-SCNC: 4.4 MMOL/L (ref 3.5–5.1)
PROT SERPL-MCNC: 5 G/DL (ref 6–8.4)
RBC # BLD AUTO: 1.98 M/UL (ref 4.6–6.2)
SODIUM SERPL-SCNC: 136 MMOL/L (ref 136–145)
SPHEROCYTES BLD QL SMEAR: ABNORMAL
WBC # BLD AUTO: 0.01 K/UL (ref 3.9–12.7)

## 2024-10-03 PROCEDURE — 84100 ASSAY OF PHOSPHORUS: CPT | Performed by: NURSE PRACTITIONER

## 2024-10-03 PROCEDURE — 63600175 PHARM REV CODE 636 W HCPCS: Performed by: NURSE PRACTITIONER

## 2024-10-03 PROCEDURE — P9040 RBC LEUKOREDUCED IRRADIATED: HCPCS | Performed by: NURSE PRACTITIONER

## 2024-10-03 PROCEDURE — 25000003 PHARM REV CODE 250: Performed by: INTERNAL MEDICINE

## 2024-10-03 PROCEDURE — 83735 ASSAY OF MAGNESIUM: CPT | Performed by: NURSE PRACTITIONER

## 2024-10-03 PROCEDURE — 86920 COMPATIBILITY TEST SPIN: CPT | Performed by: NURSE PRACTITIONER

## 2024-10-03 PROCEDURE — 63600175 PHARM REV CODE 636 W HCPCS: Performed by: INTERNAL MEDICINE

## 2024-10-03 PROCEDURE — 25000003 PHARM REV CODE 250: Performed by: NURSE PRACTITIONER

## 2024-10-03 PROCEDURE — 80053 COMPREHEN METABOLIC PANEL: CPT | Performed by: NURSE PRACTITIONER

## 2024-10-03 PROCEDURE — 85025 COMPLETE CBC W/AUTO DIFF WBC: CPT | Performed by: NURSE PRACTITIONER

## 2024-10-03 PROCEDURE — 20600001 HC STEP DOWN PRIVATE ROOM

## 2024-10-03 RX ORDER — DIPHENHYDRAMINE HCL 25 MG
25 CAPSULE ORAL ONCE AS NEEDED
Status: COMPLETED | OUTPATIENT
Start: 2024-10-03 | End: 2024-10-09

## 2024-10-03 RX ORDER — LOPERAMIDE HYDROCHLORIDE 2 MG/1
2 CAPSULE ORAL 4 TIMES DAILY PRN
Status: DISCONTINUED | OUTPATIENT
Start: 2024-10-03 | End: 2024-10-15

## 2024-10-03 RX ORDER — ACETAMINOPHEN 325 MG/1
650 TABLET ORAL ONCE AS NEEDED
Status: COMPLETED | OUTPATIENT
Start: 2024-10-03 | End: 2024-10-05

## 2024-10-03 RX ORDER — HYDROCODONE BITARTRATE AND ACETAMINOPHEN 500; 5 MG/1; MG/1
TABLET ORAL
Status: DISCONTINUED | OUTPATIENT
Start: 2024-10-03 | End: 2024-10-04

## 2024-10-03 RX ADMIN — URSODIOL 300 MG: 300 CAPSULE ORAL at 08:10

## 2024-10-03 RX ADMIN — LEVOFLOXACIN 500 MG: 500 TABLET, FILM COATED ORAL at 09:10

## 2024-10-03 RX ADMIN — GABAPENTIN 600 MG: 300 CAPSULE ORAL at 08:10

## 2024-10-03 RX ADMIN — Medication: at 08:10

## 2024-10-03 RX ADMIN — CARVEDILOL 6.25 MG: 6.25 TABLET, FILM COATED ORAL at 08:10

## 2024-10-03 RX ADMIN — TAMSULOSIN HYDROCHLORIDE 0.4 MG: 0.4 CAPSULE ORAL at 09:10

## 2024-10-03 RX ADMIN — LIDOCAINE 5% 1 PATCH: 700 PATCH TOPICAL at 09:10

## 2024-10-03 RX ADMIN — VANCOMYCIN HYDROCHLORIDE 125 MG: KIT at 09:10

## 2024-10-03 RX ADMIN — PROCHLORPERAZINE EDISYLATE 5 MG: 5 INJECTION INTRAMUSCULAR; INTRAVENOUS at 11:10

## 2024-10-03 RX ADMIN — PROCHLORPERAZINE EDISYLATE 5 MG: 5 INJECTION INTRAMUSCULAR; INTRAVENOUS at 12:10

## 2024-10-03 RX ADMIN — CHOLESTYRAMINE 4 G: 4 POWDER, FOR SUSPENSION ORAL at 09:10

## 2024-10-03 RX ADMIN — MYCOPHENOLATE MOFETIL 1000 MG: 250 CAPSULE ORAL at 09:10

## 2024-10-03 RX ADMIN — ONDANSETRON 8 MG: 2 INJECTION INTRAMUSCULAR; INTRAVENOUS at 02:10

## 2024-10-03 RX ADMIN — SODIUM CHLORIDE AND POTASSIUM CHLORIDE: .9; .15 SOLUTION INTRAVENOUS at 10:10

## 2024-10-03 RX ADMIN — ONDANSETRON 8 MG: 2 INJECTION INTRAMUSCULAR; INTRAVENOUS at 06:10

## 2024-10-03 RX ADMIN — FILGRASTIM 300 MCG: 300 INJECTION, SOLUTION INTRAVENOUS; SUBCUTANEOUS at 09:10

## 2024-10-03 RX ADMIN — CHOLESTYRAMINE 4 G: 4 POWDER, FOR SUSPENSION ORAL at 08:10

## 2024-10-03 RX ADMIN — ACYCLOVIR 800 MG: 200 CAPSULE ORAL at 09:10

## 2024-10-03 RX ADMIN — TACROLIMUS 1 MG: 1 CAPSULE ORAL at 06:10

## 2024-10-03 RX ADMIN — GABAPENTIN 600 MG: 300 CAPSULE ORAL at 09:10

## 2024-10-03 RX ADMIN — VANCOMYCIN HYDROCHLORIDE 125 MG: KIT at 08:10

## 2024-10-03 RX ADMIN — TACROLIMUS 1 MG: 1 CAPSULE ORAL at 09:10

## 2024-10-03 RX ADMIN — POSACONAZOLE 300 MG: 100 TABLET, DELAYED RELEASE ORAL at 09:10

## 2024-10-03 RX ADMIN — PROCHLORPERAZINE EDISYLATE 5 MG: 5 INJECTION INTRAMUSCULAR; INTRAVENOUS at 06:10

## 2024-10-03 RX ADMIN — Medication 6 MG: at 08:10

## 2024-10-03 RX ADMIN — SODIUM CHLORIDE AND POTASSIUM CHLORIDE: .9; .15 SOLUTION INTRAVENOUS at 12:10

## 2024-10-03 RX ADMIN — TRAZODONE HYDROCHLORIDE 50 MG: 50 TABLET ORAL at 08:10

## 2024-10-03 RX ADMIN — SODIUM CHLORIDE AND POTASSIUM CHLORIDE: .9; .15 SOLUTION INTRAVENOUS at 08:10

## 2024-10-03 RX ADMIN — MYCOPHENOLATE MOFETIL 1000 MG: 250 CAPSULE ORAL at 08:10

## 2024-10-03 RX ADMIN — Medication 1 DOSE: at 08:10

## 2024-10-03 RX ADMIN — LETERMOVIR 480 MG: 480 TABLET, FILM COATED ORAL at 09:10

## 2024-10-03 RX ADMIN — CARVEDILOL 6.25 MG: 6.25 TABLET, FILM COATED ORAL at 09:10

## 2024-10-03 RX ADMIN — ONDANSETRON 8 MG: 2 INJECTION INTRAMUSCULAR; INTRAVENOUS at 10:10

## 2024-10-03 RX ADMIN — Medication 400 MG: at 06:10

## 2024-10-03 RX ADMIN — ACYCLOVIR 800 MG: 200 CAPSULE ORAL at 08:10

## 2024-10-03 RX ADMIN — Medication 1 DOSE: at 09:10

## 2024-10-03 RX ADMIN — Medication: at 09:10

## 2024-10-03 RX ADMIN — Medication 1 DOSE: at 05:10

## 2024-10-03 RX ADMIN — Medication 400 MG: at 10:10

## 2024-10-03 RX ADMIN — MYCOPHENOLATE MOFETIL 1000 MG: 250 CAPSULE ORAL at 03:10

## 2024-10-03 RX ADMIN — URSODIOL 300 MG: 300 CAPSULE ORAL at 09:10

## 2024-10-03 RX ADMIN — HYDROCORTISONE: 25 CREAM TOPICAL at 08:10

## 2024-10-03 RX ADMIN — HYDROCORTISONE: 25 CREAM TOPICAL at 09:10

## 2024-10-03 RX ADMIN — PANTOPRAZOLE SODIUM 40 MG: 40 TABLET, DELAYED RELEASE ORAL at 09:10

## 2024-10-03 NOTE — PROGRESS NOTES
Janusz Ma - Oncology ([facility])  Hematology  Bone Marrow Transplant  Progress Note    Patient Name: Guillaume Salinas  Admission Date: 9/13/2024  Hospital Length of Stay: 20 days  Code Status: Full Code    Subjective:     Interval History: Day +14 from a  TBI PT Cy haplo (son) BMT for MDS.  Remains afebrile. VSS. Transfusing 1 unit prbc for hgb of 6.0. Oral intake, including fluids, remains poor. Continuing IVF. Bilat pedal edema noted. Encouraged patient to elevated lower extremities while in chair. Diarrhea significantly improved with Questran. One small BM over night. Changing loperamide to PRN. Patient aware that he will need to ask for medication. No aGVHD. Wife and daughter at bedside at time of exam. Danish-speaking  utilized. Spend good deal of time discussing treatment plan with patient and family via Danish-speaking .    Objective:     Vital Signs (Most Recent):  Temp: 98 °F (36.7 °C) (10/03/24 0809)  Pulse: 103 (10/03/24 0809)  Resp: 16 (10/03/24 0809)  BP: 110/68 (10/03/24 0809)  SpO2: (!) 93 % (10/03/24 0809) Vital Signs (24h Range):  Temp:  [97.5 °F (36.4 °C)-98.5 °F (36.9 °C)] 98 °F (36.7 °C)  Pulse:  [] 103  Resp:  [16-18] 16  SpO2:  [93 %-95 %] 93 %  BP: (110-153)/(63-87) 110/68     Weight: 76.5 kg (168 lb 10.4 oz)  Body mass index is 24.91 kg/m².  Body surface area is 1.93 meters squared.    ECOG SCORE           [unfilled]    Intake/Output - Last 3 Shifts         10/01 0700  10/02 0659 10/02 0700  10/03 0659 10/03 0700  10/04 0659    P.O. 604 776     I.V. (mL/kg) 1105.1 (14.7) 1198.1 (15.7) 1348.2 (17.6)    Blood  274     Total Intake(mL/kg) 1709.1 (22.8) 2248.1 (29.4) 1348.2 (17.6)    Urine (mL/kg/hr) 1900 (1.1) 2300 (1.3)     Stool 0 0     Total Output 1900 2300     Net -191 -52 +1348.2           Urine Occurrence 4 x 1 x     Stool Occurrence 3 x 1 x              Physical Exam  Constitutional:       Appearance: He is well-developed.   HENT:      Head:  Normocephalic and atraumatic.      Mouth/Throat:      Pharynx: No oropharyngeal exudate.      Comments: Scab to lower lip  Eyes:      General:         Right eye: No discharge.         Left eye: No discharge.      Conjunctiva/sclera: Conjunctivae normal.      Pupils: Pupils are equal, round, and reactive to light.   Cardiovascular:      Rate and Rhythm: Normal rate and regular rhythm.      Heart sounds: Normal heart sounds. No murmur heard.  Pulmonary:      Effort: Pulmonary effort is normal. No respiratory distress.      Breath sounds: Normal breath sounds. No wheezing or rales.   Abdominal:      General: Bowel sounds are normal.      Palpations: Abdomen is soft.      Tenderness: There is no abdominal tenderness.   Musculoskeletal:         General: No deformity. Normal range of motion.      Cervical back: Normal range of motion and neck supple.      Right lower leg: Edema (+1) present.      Left lower leg: Edema (+1) present.   Skin:     General: Skin is warm and dry.      Findings: No erythema or rash.      Comments: Right chest wall vas cath. Dressing c/d/i. No sign of infection to site.   Neurological:      Mental Status: He is alert and oriented to person, place, and time.   Psychiatric:         Behavior: Behavior normal.         Thought Content: Thought content normal.         Judgment: Judgment normal.            Significant Labs:   CBC:   Recent Labs   Lab 10/02/24  0337 10/03/24  0334   WBC 0.01* 0.01*   HGB 7.1* 6.0*   HCT 20.4* 17.1*   PLT 5* 17*    and CMP:   Recent Labs   Lab 10/02/24  0337 10/03/24  0334    136   K 4.2 4.4    107   CO2 22* 23   * 109   BUN 10 11   CREATININE 0.7 0.7   CALCIUM 8.8 8.1*   PROT 5.3* 5.0*   ALBUMIN 2.8* 2.5*   BILITOT 0.6 0.6   ALKPHOS 57 52*   AST 13 12   ALT 12 10   ANIONGAP 7* 6*       Diagnostic Results:  I have reviewed all pertinent imaging results/findings within the past 24 hours.  Assessment/Plan:     * S/P allogeneic bone marrow transplant  -  Patient of Dr. 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 Today is Day +14  - Tacro level 9.7 yesterday (10/2); continued tacro dose 1mg BID; next level 10/4  -Continue daily acute GVHD charting while inpatient. None noted thus far.  - Patient is B+. Donor is A+.  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 Patient will start letermovir ppx on 9/24/24.  - Received fresh BMT product on 9/19/24 with a CD34 dose of 3.55 x10^6.  - Of note, cilostazol may interact with tacro. (Currently on hold for plts < 50K).  - See treatment plan below:    Planned conditioning regimen:  Fludarabine on Day -5, -4, -3, and -2  Melphalan on Day -2  TBI on Day -1     Planned  GVHD Prophylaxis:  Cyclophosphamide (with Mesna) on Days +3 and +4  Mycophenolate starting on Day +5  Tacrolimus starting on Day +5     Antimicrobial Prophylaxis:  Acyclovir starting on Day -5  Levofloxacin starting on Day -1  Micafungin on Day -1 through Day +4  Letermovir starting on Day +5 (if CMV PCR drawn on day 0 results negative)  Posaconazole starting on Day +5  Bactrim starting on Day +30     SOS/VOD Prophylaxis:  Ursodiol on Day -5 through Day +30      Growth Factor Support:  Neupogen starting on Day +5     Caregiver: wife   Post-transplant discharge plans: home        Myelodysplastic syndrome  From Dr Hickey's clinic note 8/5:  Stem cell transplant candidate: We discussed the role for allogeneic stem cell transplantation in this disease process as a potentially curative option. We had an extensive discussion about the rationale, logistics, risks, and benefits. We reviewed the requirement to stay in the [place] area for 100 days with a caregiver at all times. We discussed the risks, including infection, graft failure, organ toxicity, graft versus host disease, relapse of disease, and secondary cancers. We reviewed the need for long-term immunosuppression and need for close monitoring. HCT-CI of 1  (intermediate risk). We had a prolonged discussion today regarding his recent deconditioning, Cdiff, and underlying disease. He is now much improved since last seen, and is improving his strength since starting to work with PT. Diarrhea now controlled. We discussed that our plan to move forward with the transplant process.   Coordinator: Fay Stephens  Regimen:  + 2Gy TBI  Donor: son (haplo)   Graft source: BM  - Admitted 9/13/24 for a  TBI PT Cy haplo (son) allogeneic BMT    Pancytopenia due to antineoplastic chemotherapy  - Daily CBC while inpatient  - Transfuse for hgb <7 Plt  or <10K  - Continue antimicrobial ppx  - Holding cilostazol for plts < 50K    Chemotherapy induced diarrhea  - C-diff neg 9/24  - Of note, was treated empirically for c-diff with oral vanc prior to admission and is receiving oral vanc ppx while admitted.  - Receiving scheduled imodium and PRN lomotil  - Started Questran 10/1.  - Diarrhea improved today, so changing imodium from scheduled to PRN    Chemotherapy-induced nausea  - scheduled zofran IV 8mg q8h on 9/27; added scheduled compazine 9/30  - has prn ativan    Coronary artery disease involving native coronary artery of native heart without angina pectoris  - Was holding home Coreg for hypotension. Resumed 9/30.    Hypomagnesemia  - See electrolyte disorder  - Tacro likely contributing.    Electrolyte disorder  - Replete per PRN electrolyte order set  - Daily CMP, mag, and phos while inpatient    Moderate malnutrition  Nutrition consulted. Most recent weight and BMI monitored-     Measurements:  Wt Readings from Last 1 Encounters:   10/03/24 76.5 kg (168 lb 10.4 oz)   Body mass index is 24.91 kg/m².    Patient has been screened and assessed by RD.    - Switched boost plus to boost breeze as diary of plus making patient nauseous  - continue IVF; rate increased to 100ml/hr  - PO intake as tolerated; on scheduled antiemetics for nausea      Dry mouth  - continue to moisten lips  with chapstick  - encouraged use of oral hygiene rinses to sterilize mouth and stimulate saliva production    Urinary frequency  - reports increased nightly frequency and urgency with low output  - no formal BPH diagnosis; started flomax 9/27  - monitor for improvement (can increase dose if not effective)    Hemorrhoids  - d/t chemo induced diarrhea  - has anusol, tucks pads, and LETS gel  - patient denies any bleeding at site  - has PRN morphine for pain control  - hemorrhoid pain improving with diarrhea control    Headache  - C/o headache 9/25  - Daily coffee drinker. Improved after drinking coffee.  - Suspect caffeine withdrawal.  - Can start caffeine tablet if patient is unable to drink coffee due to chemo side effects  - none today    Neutropenic fever  - First fever with tmax 101.8F on 9/23  - Infection w/u unremarkable thus far  - No fevers since 9/23, Cefepime stopped on 9/25 and back on oral ppx LVQ.  RESOLVED    Insomnia  - has PRN Trazodone however not currently using  - started on scheduled melatonin    History of Clostridioides difficile infection  - Was treated empirically for c-diff with oral vanc prior to admission.  - Continue oral vanc ppx per transplant protocol      Neuropathy  - Continue home gabapentin    Hypertension, essential  - Was holding home Coreg for fluid responsive hypotension. Resumed 9/30.        VTE Risk Mitigation (From admission, onward)           Ordered     heparin, porcine (PF) 100 unit/mL injection flush 300 Units  As needed (PRN)         09/13/24 6158                    Disposition: Inpatient for allogeneic BMT. Awaiting neutrophil engraftment.    Luz Napoles, NP  Bone Marrow Transplant  Janusz Ma - Oncology ([facility])

## 2024-10-03 NOTE — CARE UPDATE
Unit [facility] Care Support Interaction      I have reviewed the chart of Guillaume Salinas who is hospitalized for S/P allogeneic bone marrow transplant. The patient is currently located in the following unit: BMT        I have assisted the primary physician in management of the following:      Central Line - Dressing changed 10/2, no s/s of infection     I have seen and examined the patient and provided the following support:     Tether rounds - reviewed fall precautions, bed alarm is on.       ANTONIO MACIAS  Unit Based [facility]

## 2024-10-03 NOTE — ASSESSMENT & PLAN NOTE
- d/t chemo induced diarrhea  - has anusol, tucks pads, and LETS gel  - patient denies any bleeding at site  - has PRN morphine for pain control  - hemorrhoid pain improving with diarrhea control

## 2024-10-03 NOTE — PT/OT/SLP PROGRESS
"Physical Therapy      Patient Name:  Guillaume Salinas   MRN:  [id number]    Patient not seen today secondary to Other (Comment) (Pt declines tx session stating "I haven't been able to rest much and I'd rather sleep right now" at 4:29 pm.  Pt agreeable to tx session on following day.). Will follow-up on next scheduled visit.    "

## 2024-10-03 NOTE — ASSESSMENT & PLAN NOTE
From Dr Hickey's clinic note 8/5:  Stem cell transplant candidate: We discussed the role for allogeneic stem cell transplantation in this disease process as a potentially curative option. We had an extensive discussion about the rationale, logistics, risks, and benefits. We reviewed the requirement to stay in the [place] area for 100 days with a caregiver at all times. We discussed the risks, including infection, graft failure, organ toxicity, graft versus host disease, relapse of disease, and secondary cancers. We reviewed the need for long-term immunosuppression and need for close monitoring. HCT-CI of 1 (intermediate risk). We had a prolonged discussion today regarding his recent deconditioning, Cdiff, and underlying disease. He is now much improved since last seen, and is improving his strength since starting to work with PT. Diarrhea now controlled. We discussed that our plan to move forward with the transplant process.   Coordinator: Fay Stephens  Regimen:  + 2Gy TBI  Donor: son (haplo)   Graft source: BM  - Admitted 9/13/24 for a  TBI PT Cy haplo (son) allogeneic BMT

## 2024-10-03 NOTE — ASSESSMENT & PLAN NOTE
- C-diff neg 9/24  - Of note, was treated empirically for c-diff with oral vanc prior to admission and is receiving oral vanc ppx while admitted.  - Receiving scheduled imodium and PRN lomotil  - Started Questran 10/1.  - Diarrhea improved today, so changing imodium from scheduled to PRN

## 2024-10-03 NOTE — ASSESSMENT & PLAN NOTE
Nutrition consulted. Most recent weight and BMI monitored-     Measurements:  Wt Readings from Last 1 Encounters:   10/03/24 76.5 kg (168 lb 10.4 oz)   Body mass index is 24.91 kg/m².    Patient has been screened and assessed by RD.    - Switched boost plus to boost breeze as diary of plus making patient nauseous  - continue IVF; rate increased to 100ml/hr  - PO intake as tolerated; on scheduled antiemetics for nausea

## 2024-10-03 NOTE — SUBJECTIVE & OBJECTIVE
Subjective:     Interval History: Day +14 from a  TBI PT Cy haplo (son) BMT for MDS.  Remains afebrile. VSS. Transfusing 1 unit prbc for hgb of 6.0. Oral intake, including fluids, remains poor. Continuing IVF. Bilat pedal edema noted. Encouraged patient to elevated lower extremities while in chair. Diarrhea significantly improved with Questran. One small BM over night. Changing loperamide to PRN. Patient aware that he will need to ask for medication. No aGVHD. Wife and daughter at bedside at time of exam. Danish-speaking  utilized. Spend good deal of time discussing treatment plan with patient and family via Danish-speaking .    Objective:     Vital Signs (Most Recent):  Temp: 98 °F (36.7 °C) (10/03/24 0809)  Pulse: 103 (10/03/24 0809)  Resp: 16 (10/03/24 0809)  BP: 110/68 (10/03/24 0809)  SpO2: (!) 93 % (10/03/24 0809) Vital Signs (24h Range):  Temp:  [97.5 °F (36.4 °C)-98.5 °F (36.9 °C)] 98 °F (36.7 °C)  Pulse:  [] 103  Resp:  [16-18] 16  SpO2:  [93 %-95 %] 93 %  BP: (110-153)/(63-87) 110/68     Weight: 76.5 kg (168 lb 10.4 oz)  Body mass index is 24.91 kg/m².  Body surface area is 1.93 meters squared.    ECOG SCORE           [unfilled]    Intake/Output - Last 3 Shifts         10/01 0700  10/02 0659 10/02 0700  10/03 0659 10/03 0700  10/04 0659    P.O. 604 776     I.V. (mL/kg) 1105.1 (14.7) 1198.1 (15.7) 1348.2 (17.6)    Blood  274     Total Intake(mL/kg) 1709.1 (22.8) 2248.1 (29.4) 1348.2 (17.6)    Urine (mL/kg/hr) 1900 (1.1) 2300 (1.3)     Stool 0 0     Total Output 1900 2300     Net -191 -52 +1348.2           Urine Occurrence 4 x 1 x     Stool Occurrence 3 x 1 x              Physical Exam  Constitutional:       Appearance: He is well-developed.   HENT:      Head: Normocephalic and atraumatic.      Mouth/Throat:      Pharynx: No oropharyngeal exudate.      Comments: Scab to lower lip  Eyes:      General:         Right eye: No discharge.         Left eye: No discharge.       Conjunctiva/sclera: Conjunctivae normal.      Pupils: Pupils are equal, round, and reactive to light.   Cardiovascular:      Rate and Rhythm: Normal rate and regular rhythm.      Heart sounds: Normal heart sounds. No murmur heard.  Pulmonary:      Effort: Pulmonary effort is normal. No respiratory distress.      Breath sounds: Normal breath sounds. No wheezing or rales.   Abdominal:      General: Bowel sounds are normal.      Palpations: Abdomen is soft.      Tenderness: There is no abdominal tenderness.   Musculoskeletal:         General: No deformity. Normal range of motion.      Cervical back: Normal range of motion and neck supple.      Right lower leg: Edema (+1) present.      Left lower leg: Edema (+1) present.   Skin:     General: Skin is warm and dry.      Findings: No erythema or rash.      Comments: Right chest wall vas cath. Dressing c/d/i. No sign of infection to site.   Neurological:      Mental Status: He is alert and oriented to person, place, and time.   Psychiatric:         Behavior: Behavior normal.         Thought Content: Thought content normal.         Judgment: Judgment normal.            Significant Labs:   CBC:   Recent Labs   Lab 10/02/24  0337 10/03/24  0334   WBC 0.01* 0.01*   HGB 7.1* 6.0*   HCT 20.4* 17.1*   PLT 5* 17*    and CMP:   Recent Labs   Lab 10/02/24  0337 10/03/24  0334    136   K 4.2 4.4    107   CO2 22* 23   * 109   BUN 10 11   CREATININE 0.7 0.7   CALCIUM 8.8 8.1*   PROT 5.3* 5.0*   ALBUMIN 2.8* 2.5*   BILITOT 0.6 0.6   ALKPHOS 57 52*   AST 13 12   ALT 12 10   ANIONGAP 7* 6*       Diagnostic Results:  I have reviewed all pertinent imaging results/findings within the past 24 hours.

## 2024-10-03 NOTE — ASSESSMENT & PLAN NOTE
- Patient of Dr. 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 Today is Day +14  - Tacro level 9.7 yesterday (10/2); continued tacro dose 1mg BID; next level 10/4  -Continue daily acute GVHD charting while inpatient. None noted thus far.  - Patient is B+. Donor is A+.  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 Patient will start letermovir ppx on 9/24/24.  - Received fresh BMT product on 9/19/24 with a CD34 dose of 3.55 x10^6.  - Of note, cilostazol may interact with tacro. (Currently on hold for plts < 50K).  - See treatment plan below:    Planned conditioning regimen:  Fludarabine on Day -5, -4, -3, and -2  Melphalan on Day -2  TBI on Day -1     Planned  GVHD Prophylaxis:  Cyclophosphamide (with Mesna) on Days +3 and +4  Mycophenolate starting on Day +5  Tacrolimus starting on Day +5     Antimicrobial Prophylaxis:  Acyclovir starting on Day -5  Levofloxacin starting on Day -1  Micafungin on Day -1 through Day +4  Letermovir starting on Day +5 (if CMV PCR drawn on day 0 results negative)  Posaconazole starting on Day +5  Bactrim starting on Day +30     SOS/VOD Prophylaxis:  Ursodiol on Day -5 through Day +30      Growth Factor Support:  Neupogen starting on Day +5     Caregiver: wife   Post-transplant discharge plans: home

## 2024-10-03 NOTE — PLAN OF CARE
Pt alert and oriented x4. One unt of RBC infused without incident. No complaints of pain, n/v, or headache. No other complaints at this time. Pt ambulates throughout room. Family remains at bedside. Bed remains in lowest locked position with call light within reach. Plan of care reviewed.

## 2024-10-03 NOTE — PLAN OF CARE
D+14 FluMel Haplo SCT. Patient AAOx4. Afebrile and VSS on room air. NaCl w/ K+ infusing @ 100. Patient is a standby assist. Free from falls and injury. 1 bowel movement overnight. Monitoring I&Os, has a decreased appetite, and CHG wipes given for self care. Family (daughter and wife) at bedside. Bed is locked and in lowest position and call light within reach. Plan of care reviewed with patient and family and verbalizes understanding. Educated to use the call light when needing assistance. Patient expresses no other needs at this time.

## 2024-10-04 LAB
ALBUMIN SERPL BCP-MCNC: 2.7 G/DL (ref 3.5–5.2)
ALP SERPL-CCNC: 53 U/L (ref 55–135)
ALT SERPL W/O P-5'-P-CCNC: 13 U/L (ref 10–44)
ANION GAP SERPL CALC-SCNC: 6 MMOL/L (ref 8–16)
ANISOCYTOSIS BLD QL SMEAR: SLIGHT
AST SERPL-CCNC: 14 U/L (ref 10–40)
BASOPHILS # BLD AUTO: 0 K/UL (ref 0–0.2)
BASOPHILS NFR BLD: 0 % (ref 0–1.9)
BILIRUB SERPL-MCNC: 0.8 MG/DL (ref 0.1–1)
BLD PROD TYP BPU: NORMAL
BLOOD UNIT EXPIRATION DATE: NORMAL
BLOOD UNIT TYPE CODE: 8400
BLOOD UNIT TYPE: NORMAL
BUN SERPL-MCNC: 10 MG/DL (ref 8–23)
CALCIUM SERPL-MCNC: 8.5 MG/DL (ref 8.7–10.5)
CHLORIDE SERPL-SCNC: 109 MMOL/L (ref 95–110)
CO2 SERPL-SCNC: 22 MMOL/L (ref 23–29)
CODING SYSTEM: NORMAL
CREAT SERPL-MCNC: 0.6 MG/DL (ref 0.5–1.4)
CROSSMATCH INTERPRETATION: NORMAL
DIFFERENTIAL METHOD BLD: ABNORMAL
DISPENSE STATUS: NORMAL
EOSINOPHIL # BLD AUTO: 0 K/UL (ref 0–0.5)
EOSINOPHIL NFR BLD: 0 % (ref 0–8)
ERYTHROCYTE [DISTWIDTH] IN BLOOD BY AUTOMATED COUNT: 13.2 % (ref 11.5–14.5)
EST. GFR  (NO RACE VARIABLE): >60 ML/MIN/1.73 M^2
GLUCOSE SERPL-MCNC: 107 MG/DL (ref 70–110)
HCT VFR BLD AUTO: 20 % (ref 40–54)
HGB BLD-MCNC: 7.1 G/DL (ref 14–18)
HYPOCHROMIA BLD QL SMEAR: ABNORMAL
IMM GRANULOCYTES # BLD AUTO: 0 K/UL (ref 0–0.04)
IMM GRANULOCYTES NFR BLD AUTO: 0 % (ref 0–0.5)
LYMPHOCYTES # BLD AUTO: 0 K/UL (ref 1–4.8)
LYMPHOCYTES NFR BLD: 0 % (ref 18–48)
MAGNESIUM SERPL-MCNC: 1.4 MG/DL (ref 1.6–2.6)
MCH RBC QN AUTO: 30.2 PG (ref 27–31)
MCHC RBC AUTO-ENTMCNC: 35.5 G/DL (ref 32–36)
MCV RBC AUTO: 85 FL (ref 82–98)
MONOCYTES # BLD AUTO: 0 K/UL (ref 0.3–1)
MONOCYTES NFR BLD: 0 % (ref 4–15)
NEUTROPHILS # BLD AUTO: 0 K/UL (ref 1.8–7.7)
NEUTROPHILS NFR BLD: 100 % (ref 38–73)
NRBC BLD-RTO: 0 /100 WBC
PHOSPHATE SERPL-MCNC: 2.9 MG/DL (ref 2.7–4.5)
PLATELET # BLD AUTO: 9 K/UL (ref 150–450)
PLATELET BLD QL SMEAR: ABNORMAL
PMV BLD AUTO: 10.4 FL (ref 9.2–12.9)
POTASSIUM SERPL-SCNC: 4.4 MMOL/L (ref 3.5–5.1)
PROT SERPL-MCNC: 5.2 G/DL (ref 6–8.4)
RBC # BLD AUTO: 2.35 M/UL (ref 4.6–6.2)
SODIUM SERPL-SCNC: 137 MMOL/L (ref 136–145)
TACROLIMUS BLD-MCNC: 14.2 NG/ML (ref 5–15)
UNIT NUMBER: NORMAL
WBC # BLD AUTO: 0.01 K/UL (ref 3.9–12.7)

## 2024-10-04 PROCEDURE — 97110 THERAPEUTIC EXERCISES: CPT

## 2024-10-04 PROCEDURE — 20600001 HC STEP DOWN PRIVATE ROOM

## 2024-10-04 PROCEDURE — 25000003 PHARM REV CODE 250: Performed by: NURSE PRACTITIONER

## 2024-10-04 PROCEDURE — P9073 PLATELETS PHERESIS PATH REDU: HCPCS | Mod: 91 | Performed by: FAMILY MEDICINE

## 2024-10-04 PROCEDURE — 80197 ASSAY OF TACROLIMUS: CPT | Performed by: INTERNAL MEDICINE

## 2024-10-04 PROCEDURE — 25000003 PHARM REV CODE 250: Performed by: INTERNAL MEDICINE

## 2024-10-04 PROCEDURE — 83735 ASSAY OF MAGNESIUM: CPT | Performed by: NURSE PRACTITIONER

## 2024-10-04 PROCEDURE — 63600175 PHARM REV CODE 636 W HCPCS: Performed by: NURSE PRACTITIONER

## 2024-10-04 PROCEDURE — 36430 TRANSFUSION BLD/BLD COMPNT: CPT

## 2024-10-04 PROCEDURE — 80053 COMPREHEN METABOLIC PANEL: CPT | Performed by: NURSE PRACTITIONER

## 2024-10-04 PROCEDURE — P9073 PLATELETS PHERESIS PATH REDU: HCPCS | Performed by: FAMILY MEDICINE

## 2024-10-04 PROCEDURE — 85025 COMPLETE CBC W/AUTO DIFF WBC: CPT | Performed by: NURSE PRACTITIONER

## 2024-10-04 PROCEDURE — 84100 ASSAY OF PHOSPHORUS: CPT | Performed by: NURSE PRACTITIONER

## 2024-10-04 PROCEDURE — 63600175 PHARM REV CODE 636 W HCPCS: Performed by: INTERNAL MEDICINE

## 2024-10-04 PROCEDURE — 97530 THERAPEUTIC ACTIVITIES: CPT

## 2024-10-04 RX ORDER — TACROLIMUS 0.5 MG/1
0.5 CAPSULE ORAL EVERY MORNING
Status: DISCONTINUED | OUTPATIENT
Start: 2024-10-05 | End: 2024-10-16 | Stop reason: HOSPADM

## 2024-10-04 RX ORDER — HYDROCODONE BITARTRATE AND ACETAMINOPHEN 500; 5 MG/1; MG/1
TABLET ORAL
Status: DISCONTINUED | OUTPATIENT
Start: 2024-10-04 | End: 2024-10-04

## 2024-10-04 RX ORDER — ACETAMINOPHEN 325 MG/1
650 TABLET ORAL ONCE AS NEEDED
Status: COMPLETED | OUTPATIENT
Start: 2024-10-04 | End: 2024-10-05

## 2024-10-04 RX ORDER — DIPHENHYDRAMINE HCL 25 MG
25 CAPSULE ORAL ONCE AS NEEDED
Status: COMPLETED | OUTPATIENT
Start: 2024-10-04 | End: 2024-10-05

## 2024-10-04 RX ORDER — HYDROCODONE BITARTRATE AND ACETAMINOPHEN 500; 5 MG/1; MG/1
TABLET ORAL
Status: DISCONTINUED | OUTPATIENT
Start: 2024-10-04 | End: 2024-10-09 | Stop reason: SDUPTHER

## 2024-10-04 RX ORDER — TACROLIMUS 0.5 MG/1
0.5 CAPSULE ORAL EVERY EVENING
Status: DISCONTINUED | OUTPATIENT
Start: 2024-10-04 | End: 2024-10-09

## 2024-10-04 RX ADMIN — TACROLIMUS 0.5 MG: 0.5 CAPSULE ORAL at 06:10

## 2024-10-04 RX ADMIN — GABAPENTIN 600 MG: 300 CAPSULE ORAL at 08:10

## 2024-10-04 RX ADMIN — HYDROCORTISONE: 25 CREAM TOPICAL at 08:10

## 2024-10-04 RX ADMIN — VANCOMYCIN HYDROCHLORIDE 125 MG: KIT at 08:10

## 2024-10-04 RX ADMIN — MYCOPHENOLATE MOFETIL 1000 MG: 250 CAPSULE ORAL at 02:10

## 2024-10-04 RX ADMIN — PROCHLORPERAZINE EDISYLATE 5 MG: 5 INJECTION INTRAMUSCULAR; INTRAVENOUS at 05:10

## 2024-10-04 RX ADMIN — PROCHLORPERAZINE EDISYLATE 5 MG: 5 INJECTION INTRAMUSCULAR; INTRAVENOUS at 11:10

## 2024-10-04 RX ADMIN — CHOLESTYRAMINE 4 G: 4 POWDER, FOR SUSPENSION ORAL at 08:10

## 2024-10-04 RX ADMIN — Medication 400 MG: at 10:10

## 2024-10-04 RX ADMIN — MYCOPHENOLATE MOFETIL 1000 MG: 250 CAPSULE ORAL at 08:10

## 2024-10-04 RX ADMIN — Medication 400 MG: at 02:10

## 2024-10-04 RX ADMIN — Medication 1 DOSE: at 08:10

## 2024-10-04 RX ADMIN — Medication 400 MG: at 06:10

## 2024-10-04 RX ADMIN — Medication: at 09:10

## 2024-10-04 RX ADMIN — ONDANSETRON 8 MG: 2 INJECTION INTRAMUSCULAR; INTRAVENOUS at 05:10

## 2024-10-04 RX ADMIN — URSODIOL 300 MG: 300 CAPSULE ORAL at 08:10

## 2024-10-04 RX ADMIN — LEVOFLOXACIN 500 MG: 500 TABLET, FILM COATED ORAL at 08:10

## 2024-10-04 RX ADMIN — LETERMOVIR 480 MG: 480 TABLET, FILM COATED ORAL at 08:10

## 2024-10-04 RX ADMIN — POSACONAZOLE 300 MG: 100 TABLET, DELAYED RELEASE ORAL at 08:10

## 2024-10-04 RX ADMIN — SODIUM CHLORIDE AND POTASSIUM CHLORIDE: .9; .15 SOLUTION INTRAVENOUS at 06:10

## 2024-10-04 RX ADMIN — ACYCLOVIR 800 MG: 200 CAPSULE ORAL at 08:10

## 2024-10-04 RX ADMIN — TAMSULOSIN HYDROCHLORIDE 0.4 MG: 0.4 CAPSULE ORAL at 08:10

## 2024-10-04 RX ADMIN — LIDOCAINE 5% 1 PATCH: 700 PATCH TOPICAL at 08:10

## 2024-10-04 RX ADMIN — SODIUM CHLORIDE AND POTASSIUM CHLORIDE: .9; .15 SOLUTION INTRAVENOUS at 04:10

## 2024-10-04 RX ADMIN — ONDANSETRON 8 MG: 2 INJECTION INTRAMUSCULAR; INTRAVENOUS at 09:10

## 2024-10-04 RX ADMIN — Medication: at 08:10

## 2024-10-04 RX ADMIN — Medication 6 MG: at 08:10

## 2024-10-04 RX ADMIN — PROCHLORPERAZINE EDISYLATE 5 MG: 5 INJECTION INTRAMUSCULAR; INTRAVENOUS at 06:10

## 2024-10-04 RX ADMIN — FILGRASTIM 300 MCG: 300 INJECTION, SOLUTION INTRAVENOUS; SUBCUTANEOUS at 08:10

## 2024-10-04 RX ADMIN — LIDOCAINE 5% 1 PATCH: 700 PATCH TOPICAL at 09:10

## 2024-10-04 RX ADMIN — CARVEDILOL 6.25 MG: 6.25 TABLET, FILM COATED ORAL at 08:10

## 2024-10-04 RX ADMIN — Medication 1 DOSE: at 01:10

## 2024-10-04 RX ADMIN — ONDANSETRON 8 MG: 2 INJECTION INTRAMUSCULAR; INTRAVENOUS at 02:10

## 2024-10-04 RX ADMIN — PANTOPRAZOLE SODIUM 40 MG: 40 TABLET, DELAYED RELEASE ORAL at 08:10

## 2024-10-04 RX ADMIN — TRAZODONE HYDROCHLORIDE 50 MG: 50 TABLET ORAL at 09:10

## 2024-10-04 RX ADMIN — PROCHLORPERAZINE EDISYLATE 5 MG: 5 INJECTION INTRAMUSCULAR; INTRAVENOUS at 12:10

## 2024-10-04 NOTE — PROGRESS NOTES
Janusz Ma - Oncology ([facility]  Hematology  Bone Marrow Transplant  Progress Note    Patient Name: Guillaume Salinas  Admission Date: 9/13/2024  Hospital Length of Stay: 21 days  Code Status: Full Code    Subjective:     Interval History: Day +15 from a  TBI PT Cy haplo (son) BMT for MDS.  Remains afebrile. VSS. Transfusing plts for plt count of 9K. Tacro level is 14.2. Holding morning dose of tacro and will resume this evening at a 50% dose reduction (0.5 mg BID). Diarrhea well-controlled with scheduled Questran and PRN imodium. Weight up from admission but down 2 lbs in last 24 hours without diuresis. Will defer diuresis again today. No aGVHD.    Objective:     Vital Signs (Most Recent):  Temp: 96.6 °F (35.9 °C) (10/04/24 1055)  Pulse: 97 (10/04/24 1055)  Resp: 20 (10/04/24 1055)  BP: 131/70 (10/04/24 1055)  SpO2: (!) 93 % (10/04/24 1055) Vital Signs (24h Range):  Temp:  [96.6 °F (35.9 °C)-98.6 °F (37 °C)] 96.6 °F (35.9 °C)  Pulse:  [] 97  Resp:  [17-20] 20  SpO2:  [91 %-95 %] 93 %  BP: (129-154)/(70-82) 131/70     Weight: 75.5 kg (166 lb 7.2 oz)  Body mass index is 24.58 kg/m².  Body surface area is 1.92 meters squared.    ECOG SCORE           [unfilled]    Intake/Output - Last 3 Shifts         10/02 0700  10/03 0659 10/03 0700  10/04 0659 10/04 0700  10/05 0659    P.O. 776 500     I.V. (mL/kg) 1198.1 (15.7) 1348.2 (17.9)     Blood 274      Total Intake(mL/kg) 2248.1 (29.4) 1848.2 (24.5)     Urine (mL/kg/hr) 2300 (1.3) 2100 (1.2) 325 (0.7)    Stool 0 0     Total Output 2300 2100 325    Net -52 -251.8 -325           Urine Occurrence 1 x      Stool Occurrence 1 x 1 x              Physical Exam  Constitutional:       Appearance: He is well-developed.   HENT:      Head: Normocephalic and atraumatic.      Mouth/Throat:      Pharynx: No oropharyngeal exudate.      Comments: Scab to lower lip  Eyes:      General:         Right eye: No discharge.         Left eye: No discharge.      Conjunctiva/sclera:  Conjunctivae normal.      Pupils: Pupils are equal, round, and reactive to light.   Cardiovascular:      Rate and Rhythm: Normal rate and regular rhythm.      Heart sounds: Normal heart sounds. No murmur heard.  Pulmonary:      Effort: Pulmonary effort is normal. No respiratory distress.      Breath sounds: Normal breath sounds. No wheezing or rales.   Abdominal:      General: Bowel sounds are normal.      Palpations: Abdomen is soft.      Tenderness: There is no abdominal tenderness.   Musculoskeletal:         General: No deformity. Normal range of motion.      Cervical back: Normal range of motion and neck supple.      Right lower leg: Edema (+1) present.      Left lower leg: Edema (+1) present.   Skin:     General: Skin is warm and dry.      Findings: No erythema or rash.      Comments: Right chest wall vas cath. Dressing c/d/i. No sign of infection to site.   Neurological:      Mental Status: He is alert and oriented to person, place, and time.   Psychiatric:         Behavior: Behavior normal.         Thought Content: Thought content normal.         Judgment: Judgment normal.            Significant Labs:   CBC:   Recent Labs   Lab 10/03/24  0334 10/04/24  0357   WBC 0.01* 0.01*   HGB 6.0* 7.1*   HCT 17.1* 20.0*   PLT 17* 9*    and CMP:   Recent Labs   Lab 10/03/24  0334 10/04/24  0357    137   K 4.4 4.4    109   CO2 23 22*    107   BUN 11 10   CREATININE 0.7 0.6   CALCIUM 8.1* 8.5*   PROT 5.0* 5.2*   ALBUMIN 2.5* 2.7*   BILITOT 0.6 0.8   ALKPHOS 52* 53*   AST 12 14   ALT 10 13   ANIONGAP 6* 6*       Diagnostic Results:  I have reviewed all pertinent imaging results/findings within the past 24 hours.  Assessment/Plan:     * S/P allogeneic bone marrow transplant  - Patient of Dr. 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 Today is Day +15  - Tacro level 14.3 today; holding morning dose and decreasing tacro dose from 1 mg BID to 0.5 mg  BID.  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 None noted thus far.  - Patient is B+. Donor is A+.  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 Patient will start letermovir ppx on 9/24/24.  - Received fresh BMT product on 9/19/24 with a CD34 dose of 3.55 x10^6.  - Of note, cilostazol may interact with tacro. (Currently on hold for plts < 50K).  - See treatment plan below:    Planned conditioning regimen:  Fludarabine on Day -5, -4, -3, and -2  Melphalan on Day -2  TBI on Day -1     Planned  GVHD Prophylaxis:  Cyclophosphamide (with [place]) on Days +3 and +4  Mycophenolate starting on Day +5  Tacrolimus starting on Day +5     Antimicrobial Prophylaxis:  Acyclovir starting on Day -5  Levofloxacin starting on Day -1  Micafungin on Day -1 through Day +4  Letermovir starting on Day +5 (if CMV PCR drawn on day 0 results negative)  Posaconazole starting on Day +5  Bactrim starting on Day +30     SOS/VOD Prophylaxis:  Ursodiol on Day -5 through Day +30      Growth Factor Support:  Neupogen starting on Day +5     Caregiver: wife   Post-transplant discharge plans: home        Myelodysplastic syndrome  From Dr Hickey's clinic note 8/5:  Stem cell transplant candidate: We discussed the role for allogeneic stem cell transplantation in this disease process as a potentially curative option. We had an extensive discussion about the rationale, logistics, risks, and benefits. We reviewed the requirement to stay in the [place] area for 100 days with a caregiver at all times. We discussed the risks, including infection, graft failure, organ toxicity, graft versus host disease, relapse of disease, and secondary cancers. We reviewed the need for long-term immunosuppression and need for close monitoring. HCT-CI of 1 (intermediate risk). We had a prolonged discussion today regarding his recent deconditioning, Cdiff, and underlying disease. He is now much improved since last seen, and is improving his strength since  starting to work with PT. Diarrhea now controlled. We discussed that our plan to move forward with the transplant process.   Coordinator: Fay Stephens  Regimen:  + 2Gy TBI  Donor: son (haplo)   Graft source: BM  - Admitted 9/13/24 for a  TBI PT Cy haplo (son) allogeneic BMT    Pancytopenia due to antineoplastic chemotherapy  - Daily CBC while inpatient  - Transfuse for hgb <7 Plt  or <10K  - Continue antimicrobial ppx  - Holding cilostazol for plts < 50K    Chemotherapy induced diarrhea  - C-diff neg 9/24  - Of note, was treated empirically for c-diff with oral vanc prior to admission and is receiving oral vanc ppx while admitted.  - Receiving scheduled imodium and PRN lomotil  - Started Questran 10/1.  - Diarrhea improved, so changied imodium from scheduled to PRN    Chemotherapy-induced nausea  - scheduled zofran IV 8mg q8h on 9/27; added scheduled compazine 9/30  - has prn ativan    Coronary artery disease involving native coronary artery of native heart without angina pectoris  - Was holding home Coreg for hypotension. Resumed 9/30.    Hypomagnesemia  - See electrolyte disorder  - Tacro likely contributing.    Electrolyte disorder  - Replete per PRN electrolyte order set  - Daily CMP, mag, and phos while inpatient    Moderate malnutrition  Nutrition consulted. Most recent weight and BMI monitored-     Measurements:  Wt Readings from Last 1 Encounters:   10/04/24 75.5 kg (166 lb 7.2 oz)   Body mass index is 24.58 kg/m².    Patient has been screened and assessed by RD.    - Switched boost plus to boost breeze as diary of plus making patient nauseous  - continue IVF; rate increased to 100ml/hr  - PO intake as tolerated; on scheduled antiemetics for nausea      Dry mouth  - continue to moisten lips with chapstick  - encouraged use of oral hygiene rinses to sterilize mouth and stimulate saliva production    Urinary frequency  - reports increased nightly frequency and urgency with low output  - no formal  BPH diagnosis; started flomax 9/27  - monitor for improvement (can increase dose if not effective)    Hemorrhoids  - d/t chemo induced diarrhea  - has anusol, tucks pads, and LETS gel  - patient denies any bleeding at site  - has PRN morphine for pain control  - hemorrhoid pain improving with diarrhea control    Headache  - C/o headache 9/25  - Daily coffee drinker. Improved after drinking coffee.  - Suspect caffeine withdrawal.  - Can start caffeine tablet if patient is unable to drink coffee due to chemo side effects  - none today    Neutropenic fever  - First fever with tmax 101.8F on 9/23  - Infection w/u unremarkable thus far  - No fevers since 9/23, Cefepime stopped on 9/25 and back on oral ppx LVQ.  RESOLVED    Insomnia  - has PRN Trazodone however not currently using  - started on scheduled melatonin    History of Clostridioides difficile infection  - Was treated empirically for c-diff with oral vanc prior to admission.  - Continue oral vanc ppx per transplant protocol      Neuropathy  - Continue home gabapentin    Hypertension, essential  - Was holding home Coreg for fluid responsive hypotension. Resumed 9/30.        VTE Risk Mitigation (From admission, onward)           Ordered     heparin, porcine (PF) 100 unit/mL injection flush 300 Units  As needed (PRN)         09/13/24 7782                    Disposition: Inpatient for allogeneic BMT. Awaiting neutrophile engraftment.    Luz Napoles, NP  Bone Marrow Transplant  Janusz Ma - Oncology ([facility])

## 2024-10-04 NOTE — PT/OT/SLP PROGRESS
Occupational Therapy   Partial Co-Treatment    Name: Guillaume Salinas  MRN: [id number]  Admitting Diagnosis:  S/P allogeneic bone marrow transplant       Recommendations:     Discharge Recommendations: No Therapy Indicated  Discharge Equipment Recommendations:  none  Barriers to discharge:  None    Assessment:     Guillaume Salinas is a 69 y.o. male with a medical diagnosis of S/P allogeneic bone marrow transplant.  He presents with minor c/o dizziness however recover well, once seated at EOB with ankle pumps. HE tolerated education  Performance deficits affecting function are impaired endurance, weakness, decreased safety awareness, impaired functional mobility.     Rehab Prognosis:  Good; patient would benefit from acute skilled OT services to address these deficits and reach maximum level of function.       Plan:     Patient to be seen 2 x/week to address the above listed problems via self-care/home management, therapeutic exercises, therapeutic activities, neuromuscular re-education  Plan of Care Expires: 10/15/24  Plan of Care Reviewed with: spouse, patient    Subjective     Chief Complaint: L shoulder pain   Patient/Family Comments/goals: Pt.reports he can lay back down now  Pain/Comfort:  Pain Rating 1: 0/10    Objective:     Communicated with: Nurse prior to session.  Patient found HOB elevated with central line, bed alarm upon OT entry to room.    General Precautions: Standard, fall    Orthopedic Precautions:N/A  Braces: N/A  Respiratory Status: Room air  * services utilized for today's session with Yessy #[id number]*      Occupational Performance:     Bed Mobility:    Patient completed Supine to Sit with contact guard assistance  Patient completed Sit to Supine with contact guard assistance     Activities of Daily Living:  Toileting: supervision micturition with urinal at bed level  Pt. Provided with the opportunity to complete this task standing or seated at EOB however  decline    [facility] 6 Click ADL: 21    Treatment & Education:  1 set of 10 reps :  Scap squeezed with Yellow TB while seated at EOB  Bicep curls Yellow TB while seated at EOB  Horizontal Abduction/Abduction Yellow TB while seated at EOB  Pt.educated on skin integrity and proper positioning to maintain skin integrity  Pt educated on role of occupational therapy, POC, and safety during ADLs and functional mobility. Pt and OT discussed importance of safe, continued mobility to optimize daily living skills. Pt verbalized understanding. Pt given instruction to call for medical staff/nurse for assistance.   Partial Co-treatment with PT for maximal pt participation, safety, and activity tolerance       Patient left HOB elevated with bed alarm on, all lines intact, call button in reach, and spouse present    GOALS:   Multidisciplinary Problems       Occupational Therapy Goals          Problem: Occupational Therapy    Goal Priority Disciplines Outcome Interventions   Occupational Therapy Goal     OT, PT/OT Progressing    Description: Goals to be met by: 10/29/2024     Patient will increase functional independence with ADLs by performing:    Pt will demonstrate independence with grooming x 3 sessions.  Pt will demonstrate independence with UE dressing x 3 sessions.  Pt will demonstrate independence with LE dressing x 3 sessions.  Pt will demonstrate independence with Toileting x 3 sessions.  Pt will demonstrate independence with transfers x 3 sessions.  Pt will complete functional mobility to simulate community distances with [place] x 3 sessions in order to maximize functional activity tolerance required for occupations of choice.   Pt will demonstrate independence with HEP for UE strengthening/endurance with independence.                           Time Tracking:     OT Date of Treatment: 10/04/24  OT Start Time: 1522  OT Stop Time: 1540  OT Total Time (min): 18 min    Billable Minutes:Therapeutic Activity 9   -  on  OT/DIMITRIS: OT     Number of DIMITRIS visits since last OT visit: 2    10/4/2024

## 2024-10-04 NOTE — PT/OT/SLP PROGRESS
Physical Therapy   Co-Treatment    Patient Name:  Guillaume Salinas   MRN:  [id number]    Recommendations:     Discharge Recommendations: No Therapy Indicated  Discharge Equipment Recommendations: none  Barriers to discharge:  none    Assessment:     Guillaume Salinas is a 69 y.o. male admitted with a medical diagnosis of S/P allogeneic bone marrow transplant.  He presents with the following impairments/functional limitations: weakness, impaired endurance, gait instability, impaired self care skills, impaired functional mobility Patient with good participation with therapy session this date, encouraged by supportive wife. Limitations include low platelet count - therefore activity at EOB, along with fatigue and mild R shoulder pain with mobility, although resolved with rest.  used for effective [place] communication virtually - Yessy #[id number]. Encouraged mobility with staff A over the weekend and bed level exercise as able. Patient will continue to benefit from skilled PT during this admit to address BLE strength and endurance deficits, and maximize independence with functional mobility.    Rehab Prognosis: Good; patient would benefit from acute skilled PT services to address these deficits and reach maximum level of function.    Recent Surgery: * No surgery found *      Plan:     During this hospitalization, patient to be seen 2 x/week to address the identified rehab impairments via gait training, therapeutic activities, therapeutic exercises, neuromuscular re-education and progress toward the following goals:    Plan of Care Expires:  10/15/24    Subjective     Chief Complaint: fatigue  Patient/Family Comments/goals: return to PLOF  Pain/Comfort:  Pain Rating 1: 0/10  Pain Rating Post-Intervention 1: 0/10      Objective:     Communicated with RN prior to session.  Patient found HOB elevated with central line + bed alarm upon PT entry to room.     General Precautions: Standard,  fall  Orthopedic Precautions: N/A  Braces: N/A  Respiratory Status: Room air     Functional Mobility:  Bed Mobility:     Scooting: contact guard assistance - R laterally when seated EOB to improve hip positioning  Supine to Sit: contact guard assistance  Sit to Supine: contact guard assistance  Transfers:  Sit to Stand: one trial with CGA to partial standing position for replacement for soiled mg pad    AM-PAC 6 CLICK MOBILITY  Turning over in bed (including adjusting bedclothes, sheets and blankets)?: 4  Sitting down on and standing up from a chair with arms (e.g., wheelchair, bedside commode, etc.): 3  Moving from lying on back to sitting on the side of the bed?: 3  Moving to and from a bed to a chair (including a wheelchair)?: 3  Need to walk in hospital room?: 3  Climbing 3-5 steps with a railing?: 3  Basic Mobility Total Score: 19       Treatment & Education:  Patient educated on calling for assistance for any needs to improve overall safety awareness.  Patient educated on importance of EOB/OOB activity to promote overall endurance.  Co-treatment with OT due to patient's poor activity tolerance and medical complexity requiring skilled assistance from 2 therapists.  X10 LAQ, seated marching, scapular retractions with cueing for appropriate form    Patient left HOB elevated with all lines intact, call button in reach, bed alarm on, and wife present..    GOALS:   Multidisciplinary Problems       Physical Therapy Goals          Problem: Physical Therapy    Goal Priority Disciplines Outcome Interventions   Physical Therapy Goal     PT, PT/OT Progressing    Description: Goals to be met by: 10/15/24     Patient will increase functional independence with mobility by performin. Supine to sit with [place]  2. Sit to supine with [place]  3. Sit to stand transfer with [place]  4. Bed to chair transfer with [place] using No Assistive Device  5. Gait  x 500 feet with [place] using No  Assistive Device.   6. Lower extremity exercise program x15 reps per handout, with assistance as needed                         Time Tracking:     PT Received On: 10/04/24  PT Start Time: 1522     PT Stop Time: 1540  PT Total Time (min): 18 min     Billable Minutes: Therapeutic Exercise 9       PT/PTA: PT     Number of PTA visits since last PT visit: 0     10/04/2024

## 2024-10-04 NOTE — SUBJECTIVE & OBJECTIVE
Subjective:     Interval History: Day +15 from a  TBI PT Cy haplo (son) BMT for MDS.  Remains afebrile. VSS. Transfusing plts for plt count of 9K. Tacro level is 14.2. Holding morning dose of tacro and will resume this evening at a 50% dose reduction (0.5 mg BID). Diarrhea well-controlled with scheduled Questran and PRN imodium. Weight up from admission but down 2 lbs in last 24 hours without diuresis. Will defer diuresis again today. No aGVHD.    Objective:     Vital Signs (Most Recent):  Temp: 96.6 °F (35.9 °C) (10/04/24 1055)  Pulse: 97 (10/04/24 1055)  Resp: 20 (10/04/24 1055)  BP: 131/70 (10/04/24 1055)  SpO2: (!) 93 % (10/04/24 1055) Vital Signs (24h Range):  Temp:  [96.6 °F (35.9 °C)-98.6 °F (37 °C)] 96.6 °F (35.9 °C)  Pulse:  [] 97  Resp:  [17-20] 20  SpO2:  [91 %-95 %] 93 %  BP: (129-154)/(70-82) 131/70     Weight: 75.5 kg (166 lb 7.2 oz)  Body mass index is 24.58 kg/m².  Body surface area is 1.92 meters squared.    ECOG SCORE           [unfilled]    Intake/Output - Last 3 Shifts         10/02 0700  10/03 0659 10/03 0700  10/04 0659 10/04 0700  10/05 0659    P.O. 776 500     I.V. (mL/kg) 1198.1 (15.7) 1348.2 (17.9)     Blood 274      Total Intake(mL/kg) 2248.1 (29.4) 1848.2 (24.5)     Urine (mL/kg/hr) 2300 (1.3) 2100 (1.2) 325 (0.7)    Stool 0 0     Total Output 2300 2100 325    Net -52 -251.8 -325           Urine Occurrence 1 x      Stool Occurrence 1 x 1 x              Physical Exam  Constitutional:       Appearance: He is well-developed.   HENT:      Head: Normocephalic and atraumatic.      Mouth/Throat:      Pharynx: No oropharyngeal exudate.      Comments: Scab to lower lip  Eyes:      General:         Right eye: No discharge.         Left eye: No discharge.      Conjunctiva/sclera: Conjunctivae normal.      Pupils: Pupils are equal, round, and reactive to light.   Cardiovascular:      Rate and Rhythm: Normal rate and regular rhythm.      Heart sounds: Normal heart sounds. No murmur  heard.  Pulmonary:      Effort: Pulmonary effort is normal. No respiratory distress.      Breath sounds: Normal breath sounds. No wheezing or rales.   Abdominal:      General: Bowel sounds are normal.      Palpations: Abdomen is soft.      Tenderness: There is no abdominal tenderness.   Musculoskeletal:         General: No deformity. Normal range of motion.      Cervical back: Normal range of motion and neck supple.      Right lower leg: Edema (+1) present.      Left lower leg: Edema (+1) present.   Skin:     General: Skin is warm and dry.      Findings: No erythema or rash.      Comments: Right chest wall vas cath. Dressing c/d/i. No sign of infection to site.   Neurological:      Mental Status: He is alert and oriented to person, place, and time.   Psychiatric:         Behavior: Behavior normal.         Thought Content: Thought content normal.         Judgment: Judgment normal.            Significant Labs:   CBC:   Recent Labs   Lab 10/03/24  0334 10/04/24  0357   WBC 0.01* 0.01*   HGB 6.0* 7.1*   HCT 17.1* 20.0*   PLT 17* 9*    and CMP:   Recent Labs   Lab 10/03/24  0334 10/04/24  0357    137   K 4.4 4.4    109   CO2 23 22*    107   BUN 11 10   CREATININE 0.7 0.6   CALCIUM 8.1* 8.5*   PROT 5.0* 5.2*   ALBUMIN 2.5* 2.7*   BILITOT 0.6 0.8   ALKPHOS 52* 53*   AST 12 14   ALT 10 13   ANIONGAP 6* 6*       Diagnostic Results:  I have reviewed all pertinent imaging results/findings within the past 24 hours.

## 2024-10-04 NOTE — ASSESSMENT & PLAN NOTE
Nutrition consulted. Most recent weight and BMI monitored-     Measurements:  Wt Readings from Last 1 Encounters:   10/04/24 75.5 kg (166 lb 7.2 oz)   Body mass index is 24.58 kg/m².    Patient has been screened and assessed by RD.    - Switched boost plus to boost breeze as diary of plus making patient nauseous  - continue IVF; rate increased to 100ml/hr  - PO intake as tolerated; on scheduled antiemetics for nausea

## 2024-10-04 NOTE — ASSESSMENT & PLAN NOTE
From Dr Hickey's clinic note 8/5:  Stem cell transplant candidate: We discussed the role for allogeneic stem cell transplantation in this disease process as a potentially curative option. We had an extensive discussion about the rationale, logistics, risks, and benefits. We reviewed the requirement to stay in the [place] area for 100 days with a caregiver at all times. We discussed the risks, including infection, graft failure, organ toxicity, graft versus host disease, relapse of disease, and secondary cancers. We reviewed the need for long-term immunosuppression and need for close monitoring. HCT-CI of 1 (intermediate risk). We had a prolonged discussion today regarding his recent deconditioning, Cdiff, and underlying disease. He is now much improved since last seen, and is improving his strength since starting to work with PT. Diarrhea now controlled. We discussed that our plan to move forward with the transplant process.   Coordinator: Fay Stephens  Regimen:  + 2Gy TBI  Donor: son (haplo)   Graft source: BM  - Admitted 9/13/24 for a  TBI PT Cy haplo (son) allogeneic BMT

## 2024-10-04 NOTE — PLAN OF CARE
Pt alert and oriented x4. Platelets infused without incident. No c/o pain, n/v, or headache. Afebrile throughout shift. Family remains at the bedside. Bed remains in lowest locked position with call light within reach. Plan of care reviewed.

## 2024-10-04 NOTE — PROGRESS NOTES
[facility] - Oncology (Hospital)  Adult Nutrition  Progress Note    SUMMARY     Recommendation/Intervention:   Continue current regular diet as tolerated, encourage PO intake  Trial [employer] 1.4 to promote adequate kcal/protein consumption  RD to monitor and follow-up as needed    Goals: Meet % EEN, EPN by RD f/u date  Nutrition Goal Status: progressing towards goal  Communication of RD Recs: other (comment) (POC)    Assessment and Plan    Nutrition Problem:  Increased nutrient needs     Related to (etiology):   Physiological causes      Signs and Symptoms (as evidenced by):   Upcoming SCT     Interventions(treatment strategy):  Collaboration of nutrition care w/ other providers     Nutrition Diagnosis Status:   Continues    Reason for Assessment    Reason For Assessment: RD follow-up  Diagnosis: other (see comments) (_)  Relevant Medical History: HTN  Interdisciplinary Rounds: did not attend  General Information Comments: Pt followed by RD team. 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 Remains on regular diet with ONS, consuming 50% of recent meals.   10/4: RD follow-up. Spoke with pt and wife at bedside. Wife translated for pt. Pt reports no appetite - afraid of nausea and possible diarrhea. Had fruit this morning, experienced diarrhea. [place] Boost Breeze [place]ID [place] [facility] [place] [place] 1.4 TID d/t dairy intolerance.   Nutrition Discharge Planning: Post transplant nutrition education provided on 9/14. Food safety/drug interactions emphasized. General healthy diet recommended. RD name/contact information, education material left. No other needs identified.     Nutrition Risk Screen    Nutrition Risk Screen: no indicators present    Nutrition/Diet History    Patient Reported Diet/Restrictions/Preferences: general  Spiritual, Cultural Beliefs, Amish Practices, Values that Affect Care: no  Food Allergies: NKFA  Factors Affecting  "Nutritional Intake: None identified at this time    Anthropometrics    Temp: 96.6 °F (35.9 °C)  Height Method: Stated  Height: 5' 9" (175.3 cm)  Height (inches): 69 in  Weight Method: Standard Scale  Weight: 75.5 kg (166 lb 7.2 oz)  Weight (lb): 166.45 lb  Ideal Body Weight (IBW), Male: 160 lb  % Ideal Body Weight, Male (lb): 102.24 %  BMI (Calculated): 24.6  BMI Grade: 18.5-24.9 - normal  Weight back to admit weight    Lab/Procedures/Meds    Pertinent Labs Reviewed: reviewed  Pertinent Labs Comments: Hgb: 7.2, Hct: 21.1, CO2: 22, Calcium: 8.3, Phosphorus: 2.4  Pertinent Medications Reviewed: reviewed   acyclovir  800 mg Oral BID    carvediloL  6.25 mg Oral BID    cholestyramine  1 packet Oral BID    filgrastim  300 mcg Subcutaneous Daily    gabapentin  600 mg Oral BID    hydrocortisone   Rectal BID    letermovir  480 mg Oral Daily    LETS   Topical (Top) BID    levoFLOXacin  500 mg Oral Daily    LIDOcaine  1 patch Transdermal Q24H    LIDOcaine  1 patch Transdermal Q24H    melatonin  6 mg Oral Nightly    mycophenolate  1,000 mg Oral TID    ondansetron  8 mg Intravenous Q8H    pantoprazole  40 mg Oral Daily    posaconazole  300 mg Oral Daily    prochlorperazine  5 mg Intravenous Q6H    sodium bicarb-sodium chloride  1 Dose Swish & Spit QID    [START ON 10/19/2024] sulfamethoxazole-trimethoprim 800-160mg  1 tablet Oral Every Mon, Wed, Fri    tacrolimus  1 mg Oral Daily PM    tamsulosin  0.4 mg Oral Daily    ursodioL  300 mg Oral BID    vancomycin  125 mg Oral BID     Estimated/Assessed Needs    Weight Used For Calorie Calculations: 74.2 kg (163 lb 9.3 oz)  Energy Calorie Requirements (kcal): 1871 kcal/d  Energy Need Method: [place] (1.25 PAL)  Protein Requirements: 89 g/d (1.2 g/kg)  Weight Used For Protein Calculations: 74.2 kg (163 lb 9.3 oz)     Estimated Fluid Requirement Method: other (see comments) (Per MD)  RDA Method (mL): 1871     Nutrition Prescription Ordered    Current Diet Order: Regular  Oral " Nutrition Supplement: Brenda Lee 1.4     Evaluation of Received Nutrient/Fluid Intake    I/O: + 10.6 L since admit  Energy Calories Required: not meeting needs  Protein Required: not meeting needs  Fluid Required: other (see comments) (As per MD)  Comments: LBM 9/26  Tolerance: tolerating  % Intake of Estimated Energy Needs: 0 - 25 %  % Meal Intake: 0 - 25 %    Nutrition Risk    Level of Risk/Frequency of Follow-up:  (1x/week)     Monitor and Evaluation    Food and Nutrient Intake: food and beverage intake, energy intake  Food and Nutrient Adminstration: diet order  Physical Activity and Function: nutrition-related ADLs and IADLs  Anthropometric Measurements: weight change, weight  Biochemical Data, Medical Tests and Procedures: gastrointestinal profile, inflammatory profile, lipid profile, glucose/endocrine profile, electrolyte and renal panel  Nutrition-Focused Physical Findings: overall appearance     Nutrition Follow-Up    RD Follow-up?: Yes    Judy Antonio, Registration Eligible, Provisional LDN

## 2024-10-04 NOTE — PLAN OF CARE
D+15 FluMel Haplo SCT. Went over POC with pt at beginning of shift.  Questions were asked and answered.  AAO x 4.  Afebrile. NS with KCL 20mEq @ 100 ml/hr. Up independently. Voiding without difficulty. No complaints of pain. Family at bedside.  Pt remained free from injury with bed in low locked position, call light with in reach, , non-skid socks, bed alarm on and frequent rounding.  Pt instructed to call for assistance as needed. Denies needs at this time.

## 2024-10-04 NOTE — ASSESSMENT & PLAN NOTE
- C-diff neg 9/24  - Of note, was treated empirically for c-diff with oral vanc prior to admission and is receiving oral vanc ppx while admitted.  - Receiving scheduled imodium and PRN lomotil  - Started Questran 10/1.  - Diarrhea improved, so changied imodium from scheduled to PRN

## 2024-10-04 NOTE — CLINICAL REVIEW
IP Sepsis Screen (most recent)       Sepsis Screen (IP) - 10/04/24 9683       Is the patient's history or complaint suggestive of a possible infection? Yes  -WL    Are there at least two of the following signs and symptoms present? Yes  -WL    Sepsis signs/symptoms - Hyper or Hypothermia Hyperthermia >100.4 or Hypothermia < 96.8  -WL    Sepsis signs/symptoms - Tachycardia Tachycardia     >90  -WL    Sepsis signs/symptoms - WBC WBC < 4,000 or WBC > 12,000  -WL    Are any of the following organ dysfunction criteria present and not considered to be due to a chronic condition? Yes  -WL    Organ Dysfunction Criteria Total Bilirubin > 2.0 Platelet count < 100,000  -WL    Organ Dysfunction Criteria - O2 O2 Saturation < 95% on room air  -WL    Initiate Sepsis Protocol No  -WL    Reason sepsis not considered Pt. receiving appropriate management   chemo, recent sepsis w/u neg, on abx -WL              User Key  (r) = Recorded By, (t) = Taken By, (c) = Cosigned By      Initials Name    Shelley Guerrero RN

## 2024-10-04 NOTE — ASSESSMENT & PLAN NOTE
- Patient of Dr. 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 Today is Day +15  - Tacro level 14.3 today; holding morning dose and decreasing tacro dose from 1 mg BID to 0.5 mg BID.  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 None noted thus far.  - Patient is B+. Donor is A+.  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 Patient will start letermovir ppx on 9/24/24.  - Received fresh BMT product on 9/19/24 with a CD34 dose of 3.55 x10^6.  - Of note, cilostazol may interact with tacro. (Currently on hold for plts < 50K).  - See treatment plan below:    Planned conditioning regimen:  Fludarabine on Day -5, -4, -3, and -2  Melphalan on Day -2  TBI on Day -1     Planned  GVHD Prophylaxis:  Cyclophosphamide (with Mesna) on Days +3 and +4  Mycophenolate starting on Day +5  Tacrolimus starting on Day +5     Antimicrobial Prophylaxis:  Acyclovir starting on Day -5  Levofloxacin starting on Day -1  Micafungin on Day -1 through Day +4  Letermovir starting on Day +5 (if CMV PCR drawn on day 0 results negative)  Posaconazole starting on Day +5  Bactrim starting on Day +30     SOS/VOD Prophylaxis:  Ursodiol on Day -5 through Day +30      Growth Factor Support:  Neupogen starting on Day +5     Caregiver: wife   Post-transplant discharge plans: home

## 2024-10-05 LAB
ABO + RH BLD: NORMAL
ALBUMIN SERPL BCP-MCNC: 2.6 G/DL (ref 3.5–5.2)
ALP SERPL-CCNC: 54 U/L (ref 55–135)
ALT SERPL W/O P-5'-P-CCNC: 10 U/L (ref 10–44)
ANION GAP SERPL CALC-SCNC: 6 MMOL/L (ref 8–16)
ANISOCYTOSIS BLD QL SMEAR: SLIGHT
AST SERPL-CCNC: 13 U/L (ref 10–40)
BASOPHILS # BLD AUTO: 0 K/UL (ref 0–0.2)
BASOPHILS NFR BLD: 0 % (ref 0–1.9)
BILIRUB SERPL-MCNC: 0.8 MG/DL (ref 0.1–1)
BLD GP AB SCN CELLS X3 SERPL QL: NORMAL
BLD PROD TYP BPU: NORMAL
BLD PROD TYP BPU: NORMAL
BLOOD UNIT EXPIRATION DATE: NORMAL
BLOOD UNIT EXPIRATION DATE: NORMAL
BLOOD UNIT TYPE CODE: 6200
BLOOD UNIT TYPE CODE: 7300
BLOOD UNIT TYPE: NORMAL
BLOOD UNIT TYPE: NORMAL
BUN SERPL-MCNC: 9 MG/DL (ref 8–23)
CALCIUM SERPL-MCNC: 8.5 MG/DL (ref 8.7–10.5)
CHLORIDE SERPL-SCNC: 108 MMOL/L (ref 95–110)
CO2 SERPL-SCNC: 23 MMOL/L (ref 23–29)
CODING SYSTEM: NORMAL
CODING SYSTEM: NORMAL
CREAT SERPL-MCNC: 0.7 MG/DL (ref 0.5–1.4)
CROSSMATCH INTERPRETATION: NORMAL
CROSSMATCH INTERPRETATION: NORMAL
DIFFERENTIAL METHOD BLD: ABNORMAL
DISPENSE STATUS: NORMAL
DISPENSE STATUS: NORMAL
EOSINOPHIL # BLD AUTO: 0 K/UL (ref 0–0.5)
EOSINOPHIL NFR BLD: 0 % (ref 0–8)
ERYTHROCYTE [DISTWIDTH] IN BLOOD BY AUTOMATED COUNT: 12.7 % (ref 11.5–14.5)
EST. GFR  (NO RACE VARIABLE): >60 ML/MIN/1.73 M^2
GLUCOSE SERPL-MCNC: 112 MG/DL (ref 70–110)
HCT VFR BLD AUTO: 19 % (ref 40–54)
HGB BLD-MCNC: 6.8 G/DL (ref 14–18)
IMM GRANULOCYTES # BLD AUTO: 0 K/UL (ref 0–0.04)
IMM GRANULOCYTES NFR BLD AUTO: 0 % (ref 0–0.5)
LYMPHOCYTES # BLD AUTO: 0 K/UL (ref 1–4.8)
LYMPHOCYTES NFR BLD: 0 % (ref 18–48)
MAGNESIUM SERPL-MCNC: 1.2 MG/DL (ref 1.6–2.6)
MCH RBC QN AUTO: 30.5 PG (ref 27–31)
MCHC RBC AUTO-ENTMCNC: 35.8 G/DL (ref 32–36)
MCV RBC AUTO: 85 FL (ref 82–98)
MONOCYTES # BLD AUTO: 0 K/UL (ref 0.3–1)
MONOCYTES NFR BLD: 16.7 % (ref 4–15)
NEUTROPHILS # BLD AUTO: 0.1 K/UL (ref 1.8–7.7)
NEUTROPHILS NFR BLD: 83.3 % (ref 38–73)
NRBC BLD-RTO: 0 /100 WBC
NUM UNITS TRANS PACKED RBC: NORMAL
PHOSPHATE SERPL-MCNC: 2.6 MG/DL (ref 2.7–4.5)
PLATELET # BLD AUTO: 10 K/UL (ref 150–450)
PLATELET BLD QL SMEAR: ABNORMAL
PMV BLD AUTO: 10.7 FL (ref 9.2–12.9)
POTASSIUM SERPL-SCNC: 4.6 MMOL/L (ref 3.5–5.1)
PROT SERPL-MCNC: 5.2 G/DL (ref 6–8.4)
RBC # BLD AUTO: 2.23 M/UL (ref 4.6–6.2)
SODIUM SERPL-SCNC: 137 MMOL/L (ref 136–145)
SPECIMEN OUTDATE: NORMAL
UNIT NUMBER: NORMAL
WBC # BLD AUTO: 0.06 K/UL (ref 3.9–12.7)

## 2024-10-05 PROCEDURE — P9037 PLATE PHERES LEUKOREDU IRRAD: HCPCS | Performed by: FAMILY MEDICINE

## 2024-10-05 PROCEDURE — 25000003 PHARM REV CODE 250: Performed by: INTERNAL MEDICINE

## 2024-10-05 PROCEDURE — 25000003 PHARM REV CODE 250: Performed by: NURSE PRACTITIONER

## 2024-10-05 PROCEDURE — 63600175 PHARM REV CODE 636 W HCPCS: Performed by: INTERNAL MEDICINE

## 2024-10-05 PROCEDURE — 84100 ASSAY OF PHOSPHORUS: CPT | Performed by: NURSE PRACTITIONER

## 2024-10-05 PROCEDURE — P9040 RBC LEUKOREDUCED IRRADIATED: HCPCS | Performed by: STUDENT IN AN ORGANIZED HEALTH CARE EDUCATION/TRAINING PROGRAM

## 2024-10-05 PROCEDURE — 86901 BLOOD TYPING SEROLOGIC RH(D): CPT | Performed by: INTERNAL MEDICINE

## 2024-10-05 PROCEDURE — 80053 COMPREHEN METABOLIC PANEL: CPT | Performed by: NURSE PRACTITIONER

## 2024-10-05 PROCEDURE — 85025 COMPLETE CBC W/AUTO DIFF WBC: CPT | Performed by: NURSE PRACTITIONER

## 2024-10-05 PROCEDURE — 86920 COMPATIBILITY TEST SPIN: CPT | Performed by: STUDENT IN AN ORGANIZED HEALTH CARE EDUCATION/TRAINING PROGRAM

## 2024-10-05 PROCEDURE — 20600001 HC STEP DOWN PRIVATE ROOM

## 2024-10-05 PROCEDURE — 63600175 PHARM REV CODE 636 W HCPCS: Performed by: NURSE PRACTITIONER

## 2024-10-05 PROCEDURE — 86900 BLOOD TYPING SEROLOGIC ABO: CPT | Performed by: INTERNAL MEDICINE

## 2024-10-05 PROCEDURE — 83735 ASSAY OF MAGNESIUM: CPT | Performed by: NURSE PRACTITIONER

## 2024-10-05 RX ORDER — HYDROCODONE BITARTRATE AND ACETAMINOPHEN 500; 5 MG/1; MG/1
TABLET ORAL
Status: DISCONTINUED | OUTPATIENT
Start: 2024-10-05 | End: 2024-10-09 | Stop reason: SDUPTHER

## 2024-10-05 RX ADMIN — Medication 1 DOSE: at 09:10

## 2024-10-05 RX ADMIN — URSODIOL 300 MG: 300 CAPSULE ORAL at 09:10

## 2024-10-05 RX ADMIN — CARVEDILOL 6.25 MG: 6.25 TABLET, FILM COATED ORAL at 09:10

## 2024-10-05 RX ADMIN — ONDANSETRON 8 MG: 2 INJECTION INTRAMUSCULAR; INTRAVENOUS at 05:10

## 2024-10-05 RX ADMIN — DIPHENHYDRAMINE HYDROCHLORIDE 25 MG: 25 CAPSULE ORAL at 11:10

## 2024-10-05 RX ADMIN — ACETAMINOPHEN 650 MG: 325 TABLET ORAL at 11:10

## 2024-10-05 RX ADMIN — DIPHENHYDRAMINE HYDROCHLORIDE 50 MG: 25 CAPSULE ORAL at 11:10

## 2024-10-05 RX ADMIN — PROCHLORPERAZINE EDISYLATE 5 MG: 5 INJECTION INTRAMUSCULAR; INTRAVENOUS at 01:10

## 2024-10-05 RX ADMIN — TACROLIMUS 0.5 MG: 0.5 CAPSULE ORAL at 09:10

## 2024-10-05 RX ADMIN — PROCHLORPERAZINE EDISYLATE 5 MG: 5 INJECTION INTRAMUSCULAR; INTRAVENOUS at 06:10

## 2024-10-05 RX ADMIN — MYCOPHENOLATE MOFETIL 1000 MG: 250 CAPSULE ORAL at 09:10

## 2024-10-05 RX ADMIN — LIDOCAINE 5% 1 PATCH: 700 PATCH TOPICAL at 09:10

## 2024-10-05 RX ADMIN — Medication 1 DOSE: at 05:10

## 2024-10-05 RX ADMIN — CHOLESTYRAMINE 4 G: 4 POWDER, FOR SUSPENSION ORAL at 09:10

## 2024-10-05 RX ADMIN — LEVOFLOXACIN 500 MG: 500 TABLET, FILM COATED ORAL at 09:10

## 2024-10-05 RX ADMIN — HYDROCORTISONE: 25 CREAM TOPICAL at 09:10

## 2024-10-05 RX ADMIN — Medication 1 TABLET: at 06:10

## 2024-10-05 RX ADMIN — Medication 800 MG: at 02:10

## 2024-10-05 RX ADMIN — MYCOPHENOLATE MOFETIL 1000 MG: 250 CAPSULE ORAL at 03:10

## 2024-10-05 RX ADMIN — SODIUM CHLORIDE AND POTASSIUM CHLORIDE: .9; .15 SOLUTION INTRAVENOUS at 03:10

## 2024-10-05 RX ADMIN — Medication: at 09:10

## 2024-10-05 RX ADMIN — Medication 1 TABLET: at 11:10

## 2024-10-05 RX ADMIN — ACYCLOVIR 800 MG: 200 CAPSULE ORAL at 09:10

## 2024-10-05 RX ADMIN — FILGRASTIM 300 MCG: 300 INJECTION, SOLUTION INTRAVENOUS; SUBCUTANEOUS at 09:10

## 2024-10-05 RX ADMIN — GABAPENTIN 600 MG: 300 CAPSULE ORAL at 09:10

## 2024-10-05 RX ADMIN — LETERMOVIR 480 MG: 480 TABLET, FILM COATED ORAL at 09:10

## 2024-10-05 RX ADMIN — Medication 800 MG: at 11:10

## 2024-10-05 RX ADMIN — VANCOMYCIN HYDROCHLORIDE 125 MG: KIT at 09:10

## 2024-10-05 RX ADMIN — PROCHLORPERAZINE EDISYLATE 5 MG: 5 INJECTION INTRAMUSCULAR; INTRAVENOUS at 05:10

## 2024-10-05 RX ADMIN — POSACONAZOLE 300 MG: 100 TABLET, DELAYED RELEASE ORAL at 09:10

## 2024-10-05 RX ADMIN — TAMSULOSIN HYDROCHLORIDE 0.4 MG: 0.4 CAPSULE ORAL at 09:10

## 2024-10-05 RX ADMIN — PANTOPRAZOLE SODIUM 40 MG: 40 TABLET, DELAYED RELEASE ORAL at 09:10

## 2024-10-05 RX ADMIN — Medication 6 MG: at 09:10

## 2024-10-05 RX ADMIN — SODIUM CHLORIDE AND POTASSIUM CHLORIDE: .9; .15 SOLUTION INTRAVENOUS at 01:10

## 2024-10-05 RX ADMIN — SODIUM CHLORIDE AND POTASSIUM CHLORIDE: .9; .15 SOLUTION INTRAVENOUS at 09:10

## 2024-10-05 RX ADMIN — LOPERAMIDE HYDROCHLORIDE 2 MG: 2 CAPSULE ORAL at 06:10

## 2024-10-05 RX ADMIN — ONDANSETRON 8 MG: 2 INJECTION INTRAMUSCULAR; INTRAVENOUS at 03:10

## 2024-10-05 RX ADMIN — Medication 800 MG: at 06:10

## 2024-10-05 RX ADMIN — Medication 1 TABLET: at 02:10

## 2024-10-05 RX ADMIN — ONDANSETRON 8 MG: 2 INJECTION INTRAMUSCULAR; INTRAVENOUS at 09:10

## 2024-10-05 RX ADMIN — TACROLIMUS 0.5 MG: 0.5 CAPSULE ORAL at 06:10

## 2024-10-05 NOTE — ASSESSMENT & PLAN NOTE
- Patient of Dr. 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 Today is Day +16  - Tacro level 14.3 today yesterday; held morning dose and decreased tacro to 0.5 mg BID.  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 None noted thus far.  - Patient is B+. Donor is A+.  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 Patient will start letermovir ppx on 9/24/24.  - Received fresh BMT product on 9/19/24 with a CD34 dose of 3.55 x10^6.  - Of note, cilostazol may interact with tacro. (Currently on hold for plts < 50K).  - See treatment plan below:    Planned conditioning regimen:  Fludarabine on Day -5, -4, -3, and -2  Melphalan on Day -2  TBI on Day -1     Planned  GVHD Prophylaxis:  Cyclophosphamide (with Mesna) on Days +3 and +4  Mycophenolate starting on Day +5  Tacrolimus starting on Day +5     Antimicrobial Prophylaxis:  Acyclovir starting on Day -5  Levofloxacin starting on Day -1  Micafungin on Day -1 through Day +4  Letermovir starting on Day +5 (if CMV PCR drawn on day 0 results negative)  Posaconazole starting on Day +5  Bactrim starting on Day +30     SOS/VOD Prophylaxis:  Ursodiol on Day -5 through Day +30      Growth Factor Support:  Neupogen starting on Day +5     Caregiver: wife   Post-transplant discharge plans: home

## 2024-10-05 NOTE — PLAN OF CARE
Pt alert and oriented x4. Pt resting throughout shift. Family remains at beside. One unit Platelets and one unit RBC infused without incident. No complaints of pain, n/v, or headache.Afebrile throughout shift. Bed remains in lowest locked position with call light within reach. Plan of care reviewed.

## 2024-10-05 NOTE — PROGRESS NOTES
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  Remains afebrile. VSS. Full conversation had through online , [place]. He is eating very little. Less than a 10th of a bannana. He feels he would eat more if nausea is better controlled. Diarrhea well-controlled with scheduled Questran and PRN imodium. Weight up from admission but down an additional 2 lbs in last 24 hours without diuresis. Will defer diuresis again today. No aGVHD.    Objective:     Vital Signs (Most Recent):  Temp: 98.1 °F (36.7 °C) (10/05/24 1545)  Pulse: 95 (10/05/24 1545)  Resp: 18 (10/05/24 1545)  BP: 137/71 (10/05/24 1545)  SpO2: 97 % (10/05/24 1545) Vital Signs (24h Range):  Temp:  [98 °F (36.7 °C)-99 °F (37.2 °C)] 98.1 °F (36.7 °C)  Pulse:  [] 95  Resp:  [16-98] 18  SpO2:  [87 %-98 %] 97 %  BP: (129-151)/(65-74) 137/71     Weight: 74.7 kg (164 lb 10.9 oz)  Body mass index is 24.32 kg/m².  Body surface area is 1.91 meters squared.    ECOG SCORE           [unfilled]    Intake/Output - Last 3 Shifts         10/03 0700  10/04 0659 10/04 0700  10/05 0659 10/05 0700  10/06 0659    P.O. 500 640     I.V. (mL/kg) 1348.2 (17.9)      Blood       Total Intake(mL/kg) 1848.2 (24.5) 640 (8.6)     Urine (mL/kg/hr) 2100 (1.2) 2175 (1.2)     Stool 0 0     Total Output 2100 2175     Net -251.8 -1535            Urine Occurrence  1 x     Stool Occurrence 1 x 7 x              Physical Exam  Constitutional:       Appearance: He is well-developed.   HENT:      Head: Normocephalic and atraumatic.      Mouth/Throat:      Pharynx: No oropharyngeal exudate.      Comments: Scab to lower lip  Eyes:      General:         Right eye: No discharge.         Left eye: No discharge.      Conjunctiva/sclera:  Conjunctivae normal.      Pupils: Pupils are equal, round, and reactive to light.   Cardiovascular:      Rate and Rhythm: Normal rate and regular rhythm.      Heart sounds: Normal heart sounds. No murmur heard.  Pulmonary:      Effort: Pulmonary effort is normal. No respiratory distress.      Breath sounds: Normal breath sounds. No wheezing or rales.   Abdominal:      General: Bowel sounds are normal.      Palpations: Abdomen is soft.      Tenderness: There is no abdominal tenderness.   Musculoskeletal:         General: No deformity. Normal range of motion.      Cervical back: Normal range of motion and neck supple.      Right lower leg: Edema (+1) present.      Left lower leg: Edema (+1) present.   Skin:     General: Skin is warm and dry.      Findings: No erythema or rash.      Comments: Right chest wall vas cath. Dressing c/d/i. No sign of infection to site.   Neurological:      Mental Status: He is alert and oriented to person, place, and time.   Psychiatric:         Behavior: Behavior normal.         Thought Content: Thought content normal.         Judgment: Judgment normal.            Significant Labs:   CBC:   Recent Labs   Lab 10/04/24  0357 10/05/24  0403   WBC 0.01* 0.06*   HGB 7.1* 6.8*   HCT 20.0* 19.0*   PLT 9* 10*    and CMP:   Recent Labs   Lab 10/04/24  0357 10/05/24  0403    137   K 4.4 4.6    108   CO2 22* 23    112*   BUN 10 9   CREATININE 0.6 0.7   CALCIUM 8.5* 8.5*   PROT 5.2* 5.2*   ALBUMIN 2.7* 2.6*   BILITOT 0.8 0.8   ALKPHOS 53* 54*   AST 14 13   ALT 13 10   ANIONGAP 6* 6*       Diagnostic Results:  I have reviewed all pertinent imaging results/findings within the past 24 hours.  Assessment/Plan:     * S/P allogeneic bone marrow transplant  - Patient of Dr. 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 Today is Day +16  - Tacro level 14.3 today yesterday; held morning dose and decreased tacro to 0.5 mg BID.  -Continue daily  acute GVHD charting while inpatient. None noted thus far.  - Patient is B+. Donor is A+.  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 Patient will start letermovir ppx on 9/24/24.  - Received fresh BMT product on 9/19/24 with a CD34 dose of 3.55 x10^6.  - Of note, cilostazol may interact with tacro. (Currently on hold for plts < 50K).  - See treatment plan below:    Planned conditioning regimen:  Fludarabine on Day -5, -4, -3, and -2  Melphalan on Day -2  TBI on Day -1     Planned  GVHD Prophylaxis:  Cyclophosphamide (with Mesna) on Days +3 and +4  Mycophenolate starting on Day +5  Tacrolimus starting on Day +5     Antimicrobial Prophylaxis:  Acyclovir starting on Day -5  Levofloxacin starting on Day -1  Micafungin on Day -1 through Day +4  Letermovir starting on Day +5 (if CMV PCR drawn on day 0 results negative)  Posaconazole starting on Day +5  Bactrim starting on Day +30     SOS/VOD Prophylaxis:  Ursodiol on Day -5 through Day +30      Growth Factor Support:  Neupogen starting on Day +5     Caregiver: wife   Post-transplant discharge plans: home        Hypomagnesemia  - See electrolyte disorder  - Tacro likely contributing.    Electrolyte disorder  - Replete per PRN electrolyte order set  - Daily CMP, mag, and phos while inpatient    Moderate malnutrition  Nutrition consulted. Most recent weight and BMI monitored-     Measurements:  Wt Readings from Last 1 Encounters:   10/05/24 74.7 kg (164 lb 10.9 oz)   Body mass index is 24.32 kg/m².    Patient has been screened and assessed by RD.    - Switched boost plus to boost breeze as diary of plus making patient nauseous  - continue IVF; rate increased to 100ml/hr  - PO intake as tolerated; on scheduled antiemetics for nausea      Dry mouth  - continue to moisten lips with chapstick  - encouraged use of oral hygiene rinses to sterilize mouth and stimulate saliva production    Urinary frequency  - reports increased nightly frequency and urgency with low output  -  no formal BPH diagnosis; started flomax 9/27  - monitor for improvement (can increase dose if not effective)    Chemotherapy-induced nausea  - scheduled zofran IV 8mg q8h on 9/27; added scheduled compazine 9/30  - has prn ativan    Hemorrhoids  - d/t chemo induced diarrhea  - has anusol, tucks pads, and LETS gel  - patient denies any bleeding at site  - has PRN morphine for pain control  - hemorrhoid pain improving with diarrhea control    Headache  - C/o headache 9/25  - Daily coffee drinker. Improved after drinking coffee.  - Suspect caffeine withdrawal.  - Can start caffeine tablet if patient is unable to drink coffee due to chemo side effects  - none today    Chemotherapy induced diarrhea  - C-diff neg 9/24  - Of note, was treated empirically for c-diff with oral vanc prior to admission and is receiving oral vanc ppx while admitted.  - Receiving scheduled imodium and PRN lomotil  - Started Questran 10/1.  - Diarrhea improved, so changied imodium from scheduled to PRN    Neutropenic fever  - First fever with tmax 101.8F on 9/23  - Infection w/u unremarkable thus far  - No fevers since 9/23, Cefepime stopped on 9/25 and back on oral ppx LVQ.  RESOLVED    Insomnia  - has PRN Trazodone however not currently using  - started on scheduled melatonin    History of Clostridioides difficile infection  - Was treated empirically for c-diff with oral vanc prior to admission.  - Continue oral vanc ppx per transplant protocol      Neuropathy  - Continue home gabapentin    Pancytopenia due to antineoplastic chemotherapy  - Daily CBC while inpatient  - Transfuse for hgb <7 Plt  or <10K  - Continue antimicrobial ppx  - Holding cilostazol for plts < 50K    Myelodysplastic syndrome  From Dr Hickey's clinic note 8/5:  Stem cell transplant candidate: We discussed the role for allogeneic stem cell transplantation in this disease process as a potentially curative option. We had an extensive discussion about the rationale, logistics,  risks, and benefits. We reviewed the requirement to stay in the [place] area for 100 days with a caregiver at all times. We discussed the risks, including infection, graft failure, organ toxicity, graft versus host disease, relapse of disease, and secondary cancers. We reviewed the need for long-term immunosuppression and need for close monitoring. HCT-CI of 1 (intermediate risk). We had a prolonged discussion today regarding his recent deconditioning, Cdiff, and underlying disease. He is now much improved since last seen, and is improving his strength since starting to work with PT. Diarrhea now controlled. We discussed that our plan to move forward with the transplant process.   Coordinator: Fay Stephens  Regimen:  + 2Gy TBI  Donor: son (haplo)   Graft source: BM  - Admitted 9/13/24 for a  TBI PT Cy haplo (son) allogeneic BMT    Hypertension, essential  - Was holding home Coreg for fluid responsive hypotension. Resumed 9/30.    Coronary artery disease involving native coronary artery of native heart without angina pectoris  - Was holding home Coreg for hypotension. Resumed 9/30.        VTE Risk Mitigation (From admission, onward)           Ordered     heparin, porcine (PF) 100 unit/mL injection flush 300 Units  As needed (PRN)         09/13/24 6026                    Disposition: Will remain inpatient through count recovery from Haplo transplant.    Mayuri Michel MD  Bone Marrow Transplant  [facility] - Oncology ([facility])

## 2024-10-05 NOTE — PLAN OF CARE
Goals: Meet % EEN, EPN by RD f/u date  Nutrition Goal Status: progressing towards goal  Communication of RD Recs: other (comment) (POC)

## 2024-10-05 NOTE — PLAN OF CARE
D+16 FluMel Haplo SCT. Went over POC with pt at beginning of shift.  Questions were asked and answered.  AAO x 4.  Afebrile. NS with KCL 20mEq @ 100 ml/hr. Up independently. Pt request tradozone for insomnia. Voiding without difficulty. No complaints of pain. Family at bedside. Pt has shortness of breath after activity, O2 on 2L/min. Pt remained free from injury with bed in low locked position, call light with in reach, non-skid socks, bed alarm on and frequent rounding.  Pt instructed to call for assistance as needed. Denies needs at this time.

## 2024-10-05 NOTE — ASSESSMENT & PLAN NOTE
Nutrition consulted. Most recent weight and BMI monitored-     Measurements:  Wt Readings from Last 1 Encounters:   10/05/24 74.7 kg (164 lb 10.9 oz)   Body mass index is 24.32 kg/m².    Patient has been screened and assessed by RD.    - Switched boost plus to boost breeze as diary of plus making patient nauseous  - continue IVF; rate increased to 100ml/hr  - PO intake as tolerated; on scheduled antiemetics for nausea

## 2024-10-05 NOTE — SUBJECTIVE & OBJECTIVE
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  Remains afebrile. VSS. Full conversation had through online , [place]. He is eating very little. Less than a 10th of a bannana. He feels he would eat more if nausea is better controlled. Diarrhea well-controlled with scheduled Questran and PRN imodium. Weight up from admission but down an additional 2 lbs in last 24 hours without diuresis. Will defer diuresis again today. No aGVHD.    Objective:     Vital Signs (Most Recent):  Temp: 98.1 °F (36.7 °C) (10/05/24 1545)  Pulse: 95 (10/05/24 1545)  Resp: 18 (10/05/24 1545)  BP: 137/71 (10/05/24 1545)  SpO2: 97 % (10/05/24 1545) Vital Signs (24h Range):  Temp:  [98 °F (36.7 °C)-99 °F (37.2 °C)] 98.1 °F (36.7 °C)  Pulse:  [] 95  Resp:  [16-98] 18  SpO2:  [87 %-98 %] 97 %  BP: (129-151)/(65-74) 137/71     Weight: 74.7 kg (164 lb 10.9 oz)  Body mass index is 24.32 kg/m².  Body surface area is 1.91 meters squared.    ECOG SCORE           [unfilled]    Intake/Output - Last 3 Shifts         10/03 0700  10/04 0659 10/04 0700  10/05 0659 10/05 0700  10/06 0659    P.O. 500 640     I.V. (mL/kg) 1348.2 (17.9)      Blood       Total Intake(mL/kg) 1848.2 (24.5) 640 (8.6)     Urine (mL/kg/hr) 2100 (1.2) 2175 (1.2)     Stool 0 0     Total Output 2100 2175     Net -251.8 -1535            Urine Occurrence  1 x     Stool Occurrence 1 x 7 x              Physical Exam  Constitutional:       Appearance: He is well-developed.   HENT:      Head: Normocephalic and atraumatic.      Mouth/Throat:      Pharynx: No oropharyngeal exudate.      Comments: Scab to lower lip  Eyes:      General:         Right eye: No discharge.         Left eye: No discharge.      Conjunctiva/sclera: Conjunctivae normal.      Pupils: Pupils are equal, round, and reactive to light.   Cardiovascular:      Rate and Rhythm: Normal rate and regular rhythm.      Heart sounds: Normal heart sounds. No murmur  heard.  Pulmonary:      Effort: Pulmonary effort is normal. No respiratory distress.      Breath sounds: Normal breath sounds. No wheezing or rales.   Abdominal:      General: Bowel sounds are normal.      Palpations: Abdomen is soft.      Tenderness: There is no abdominal tenderness.   Musculoskeletal:         General: No deformity. Normal range of motion.      Cervical back: Normal range of motion and neck supple.      Right lower leg: Edema (+1) present.      Left lower leg: Edema (+1) present.   Skin:     General: Skin is warm and dry.      Findings: No erythema or rash.      Comments: Right chest wall vas cath. Dressing c/d/i. No sign of infection to site.   Neurological:      Mental Status: He is alert and oriented to person, place, and time.   Psychiatric:         Behavior: Behavior normal.         Thought Content: Thought content normal.         Judgment: Judgment normal.            Significant Labs:   CBC:   Recent Labs   Lab 10/04/24  0357 10/05/24  0403   WBC 0.01* 0.06*   HGB 7.1* 6.8*   HCT 20.0* 19.0*   PLT 9* 10*    and CMP:   Recent Labs   Lab 10/04/24  0357 10/05/24  0403    137   K 4.4 4.6    108   CO2 22* 23    112*   BUN 10 9   CREATININE 0.6 0.7   CALCIUM 8.5* 8.5*   PROT 5.2* 5.2*   ALBUMIN 2.7* 2.6*   BILITOT 0.8 0.8   ALKPHOS 53* 54*   AST 14 13   ALT 13 10   ANIONGAP 6* 6*       Diagnostic Results:  I have reviewed all pertinent imaging results/findings within the past 24 hours.

## 2024-10-06 LAB
ALBUMIN SERPL BCP-MCNC: 2.5 G/DL (ref 3.5–5.2)
ALP SERPL-CCNC: 51 U/L (ref 55–135)
ALT SERPL W/O P-5'-P-CCNC: 10 U/L (ref 10–44)
ANION GAP SERPL CALC-SCNC: 6 MMOL/L (ref 8–16)
ANISOCYTOSIS BLD QL SMEAR: SLIGHT
AST SERPL-CCNC: 12 U/L (ref 10–40)
BASOPHILS # BLD AUTO: ABNORMAL K/UL (ref 0–0.2)
BASOPHILS NFR BLD: 0 % (ref 0–1.9)
BILIRUB SERPL-MCNC: 0.6 MG/DL (ref 0.1–1)
BUN SERPL-MCNC: 10 MG/DL (ref 8–23)
CALCIUM SERPL-MCNC: 8.3 MG/DL (ref 8.7–10.5)
CHLORIDE SERPL-SCNC: 107 MMOL/L (ref 95–110)
CO2 SERPL-SCNC: 23 MMOL/L (ref 23–29)
CREAT SERPL-MCNC: 0.8 MG/DL (ref 0.5–1.4)
DIFFERENTIAL METHOD BLD: ABNORMAL
DOHLE BOD BLD QL SMEAR: PRESENT
EOSINOPHIL # BLD AUTO: ABNORMAL K/UL (ref 0–0.5)
EOSINOPHIL NFR BLD: 0 % (ref 0–8)
ERYTHROCYTE [DISTWIDTH] IN BLOOD BY AUTOMATED COUNT: 13.2 % (ref 11.5–14.5)
EST. GFR  (NO RACE VARIABLE): >60 ML/MIN/1.73 M^2
GLUCOSE SERPL-MCNC: 119 MG/DL (ref 70–110)
HCT VFR BLD AUTO: 20 % (ref 40–54)
HGB BLD-MCNC: 7 G/DL (ref 14–18)
IMM GRANULOCYTES # BLD AUTO: ABNORMAL K/UL (ref 0–0.04)
IMM GRANULOCYTES NFR BLD AUTO: ABNORMAL % (ref 0–0.5)
LYMPHOCYTES # BLD AUTO: ABNORMAL K/UL (ref 1–4.8)
LYMPHOCYTES NFR BLD: 0 % (ref 18–48)
MAGNESIUM SERPL-MCNC: 1.2 MG/DL (ref 1.6–2.6)
MCH RBC QN AUTO: 29.5 PG (ref 27–31)
MCHC RBC AUTO-ENTMCNC: 35 G/DL (ref 32–36)
MCV RBC AUTO: 84 FL (ref 82–98)
MONOCYTES # BLD AUTO: ABNORMAL K/UL (ref 0.3–1)
MONOCYTES NFR BLD: 12.5 % (ref 4–15)
NEUTROPHILS NFR BLD: 75 % (ref 38–73)
NEUTS BAND NFR BLD MANUAL: 12.5 %
NRBC BLD-RTO: 0 /100 WBC
OVALOCYTES BLD QL SMEAR: ABNORMAL
PHOSPHATE SERPL-MCNC: 3.1 MG/DL (ref 2.7–4.5)
PLATELET # BLD AUTO: 20 K/UL (ref 150–450)
PLATELET BLD QL SMEAR: ABNORMAL
PMV BLD AUTO: 9.8 FL (ref 9.2–12.9)
POIKILOCYTOSIS BLD QL SMEAR: SLIGHT
POTASSIUM SERPL-SCNC: 4.3 MMOL/L (ref 3.5–5.1)
PROT SERPL-MCNC: 5 G/DL (ref 6–8.4)
RBC # BLD AUTO: 2.37 M/UL (ref 4.6–6.2)
SODIUM SERPL-SCNC: 136 MMOL/L (ref 136–145)
SPHEROCYTES BLD QL SMEAR: ABNORMAL
TOXIC GRANULES BLD QL SMEAR: PRESENT
WBC # BLD AUTO: 0.1 K/UL (ref 3.9–12.7)
WBC TOXIC VACUOLES BLD QL SMEAR: PRESENT

## 2024-10-06 PROCEDURE — 25000003 PHARM REV CODE 250: Performed by: NURSE PRACTITIONER

## 2024-10-06 PROCEDURE — 25000003 PHARM REV CODE 250: Performed by: INTERNAL MEDICINE

## 2024-10-06 PROCEDURE — 63600175 PHARM REV CODE 636 W HCPCS: Performed by: INTERNAL MEDICINE

## 2024-10-06 PROCEDURE — 83735 ASSAY OF MAGNESIUM: CPT | Performed by: NURSE PRACTITIONER

## 2024-10-06 PROCEDURE — 63600175 PHARM REV CODE 636 W HCPCS: Performed by: STUDENT IN AN ORGANIZED HEALTH CARE EDUCATION/TRAINING PROGRAM

## 2024-10-06 PROCEDURE — 85027 COMPLETE CBC AUTOMATED: CPT | Performed by: NURSE PRACTITIONER

## 2024-10-06 PROCEDURE — 63600175 PHARM REV CODE 636 W HCPCS: Performed by: NURSE PRACTITIONER

## 2024-10-06 PROCEDURE — 85007 BL SMEAR W/DIFF WBC COUNT: CPT | Performed by: NURSE PRACTITIONER

## 2024-10-06 PROCEDURE — 84100 ASSAY OF PHOSPHORUS: CPT | Performed by: NURSE PRACTITIONER

## 2024-10-06 PROCEDURE — 80053 COMPREHEN METABOLIC PANEL: CPT | Performed by: NURSE PRACTITIONER

## 2024-10-06 PROCEDURE — 20600001 HC STEP DOWN PRIVATE ROOM

## 2024-10-06 RX ORDER — MAGNESIUM SULFATE HEPTAHYDRATE 40 MG/ML
2 INJECTION, SOLUTION INTRAVENOUS ONCE
Status: COMPLETED | OUTPATIENT
Start: 2024-10-06 | End: 2024-10-06

## 2024-10-06 RX ADMIN — TRAMADOL HYDROCHLORIDE 50 MG: 50 TABLET, COATED ORAL at 05:10

## 2024-10-06 RX ADMIN — MYCOPHENOLATE MOFETIL 1000 MG: 250 CAPSULE ORAL at 08:10

## 2024-10-06 RX ADMIN — CARVEDILOL 6.25 MG: 6.25 TABLET, FILM COATED ORAL at 08:10

## 2024-10-06 RX ADMIN — ACYCLOVIR 800 MG: 200 CAPSULE ORAL at 08:10

## 2024-10-06 RX ADMIN — PROCHLORPERAZINE EDISYLATE 5 MG: 5 INJECTION INTRAMUSCULAR; INTRAVENOUS at 12:10

## 2024-10-06 RX ADMIN — PROCHLORPERAZINE EDISYLATE 5 MG: 5 INJECTION INTRAMUSCULAR; INTRAVENOUS at 06:10

## 2024-10-06 RX ADMIN — Medication 1 DOSE: at 01:10

## 2024-10-06 RX ADMIN — Medication: at 08:10

## 2024-10-06 RX ADMIN — GABAPENTIN 600 MG: 300 CAPSULE ORAL at 08:10

## 2024-10-06 RX ADMIN — VANCOMYCIN HYDROCHLORIDE 125 MG: KIT at 09:10

## 2024-10-06 RX ADMIN — ONDANSETRON 8 MG: 2 INJECTION INTRAMUSCULAR; INTRAVENOUS at 05:10

## 2024-10-06 RX ADMIN — LETERMOVIR 480 MG: 480 TABLET, FILM COATED ORAL at 08:10

## 2024-10-06 RX ADMIN — MYCOPHENOLATE MOFETIL 1000 MG: 250 CAPSULE ORAL at 03:10

## 2024-10-06 RX ADMIN — PROCHLORPERAZINE EDISYLATE 5 MG: 5 INJECTION INTRAMUSCULAR; INTRAVENOUS at 05:10

## 2024-10-06 RX ADMIN — Medication 1 DOSE: at 08:10

## 2024-10-06 RX ADMIN — ONDANSETRON 8 MG: 2 INJECTION INTRAMUSCULAR; INTRAVENOUS at 03:10

## 2024-10-06 RX ADMIN — VANCOMYCIN HYDROCHLORIDE 125 MG: KIT at 08:10

## 2024-10-06 RX ADMIN — TAMSULOSIN HYDROCHLORIDE 0.4 MG: 0.4 CAPSULE ORAL at 08:10

## 2024-10-06 RX ADMIN — URSODIOL 300 MG: 300 CAPSULE ORAL at 08:10

## 2024-10-06 RX ADMIN — LIDOCAINE 5% 1 PATCH: 700 PATCH TOPICAL at 08:10

## 2024-10-06 RX ADMIN — ONDANSETRON 8 MG: 2 INJECTION INTRAMUSCULAR; INTRAVENOUS at 10:10

## 2024-10-06 RX ADMIN — TRAMADOL HYDROCHLORIDE 50 MG: 50 TABLET, COATED ORAL at 10:10

## 2024-10-06 RX ADMIN — HYDROCORTISONE: 25 CREAM TOPICAL at 08:10

## 2024-10-06 RX ADMIN — MAGNESIUM SULFATE HEPTAHYDRATE 2 G: 40 INJECTION, SOLUTION INTRAVENOUS at 10:10

## 2024-10-06 RX ADMIN — HYDROCORTISONE: 25 CREAM TOPICAL at 09:10

## 2024-10-06 RX ADMIN — Medication 1 DOSE: at 05:10

## 2024-10-06 RX ADMIN — FILGRASTIM 300 MCG: 300 INJECTION, SOLUTION INTRAVENOUS; SUBCUTANEOUS at 08:10

## 2024-10-06 RX ADMIN — Medication 6 MG: at 08:10

## 2024-10-06 RX ADMIN — TACROLIMUS 0.5 MG: 0.5 CAPSULE ORAL at 06:10

## 2024-10-06 RX ADMIN — CHOLESTYRAMINE 4 G: 4 POWDER, FOR SUSPENSION ORAL at 08:10

## 2024-10-06 RX ADMIN — PANTOPRAZOLE SODIUM 40 MG: 40 TABLET, DELAYED RELEASE ORAL at 08:10

## 2024-10-06 RX ADMIN — POSACONAZOLE 300 MG: 100 TABLET, DELAYED RELEASE ORAL at 08:10

## 2024-10-06 RX ADMIN — TACROLIMUS 0.5 MG: 0.5 CAPSULE ORAL at 08:10

## 2024-10-06 RX ADMIN — LEVOFLOXACIN 500 MG: 500 TABLET, FILM COATED ORAL at 08:10

## 2024-10-06 RX ADMIN — SODIUM CHLORIDE AND POTASSIUM CHLORIDE: .9; .15 SOLUTION INTRAVENOUS at 06:10

## 2024-10-06 NOTE — PROGRESS NOTES
Assumed care of pt  [id number]  Patient involved in plan of care and communication needs throughout shift        -Dx: Myelodysplastic syndrome  -AAOx4 Danish speaking   -c/o shoulder pain relieved by tramadol    -Assist x 1 with transfers   -Regular diet  -Remains afebrile  -Voids via urinal at bedside  -No BM this shift   -RA  -Skin intact               -q 2 hour patient rounds. VSS , no acute events so far this shift. Daughter and wife remain at bedside. Non-skid socks when out of bed. Bed locked and in lowest position. IV fluids infusing @ 100 ml/hr as ordered. Call light within reach as well as all belongings. Instructed to call for assistance before getting out of bed, verbalized understanding. Will continue to monitor.

## 2024-10-06 NOTE — ASSESSMENT & PLAN NOTE
From Dr Hickey's clinic note 8/5:  Stem cell transplant candidate: We discussed the role for allogeneic stem cell transplantation in this disease process as a potentially curative option. We had an extensive discussion about the rationale, logistics, risks, and benefits. We reviewed the requirement to stay in the [place] area for 100 days with a caregiver at all times. We discussed the risks, including infection, graft failure, organ toxicity, graft versus host disease, relapse of disease, and secondary cancers. We reviewed the need for long-term immunosuppression and need for close monitoring. HCT-CI of 1 (intermediate risk). We had a prolonged discussion today regarding his recent deconditioning, Cdiff, and underlying disease. He is now much improved since last seen, and is improving his strength since starting to work with PT. Diarrhea now controlled. We discussed that our plan to move forward with the transplant process.   Coordinator: Fay Stephens  Regimen:  + 2Gy TBI  Donor: son (haplo)   Graft source: BM  - Admitted 9/13/24 for a  TBI PT Cy haplo (son) allogeneic BMT

## 2024-10-06 NOTE — PLAN OF CARE
Pt alert and oriented x4. Mag replaced via IVPB. Pt able to ambulate to bedside chair with assistance. No complaints of pain, n/v, or headache. Family remains at bedside. Bed remains in lowest locked position with call light within reach. Plan of care reviewed.

## 2024-10-06 NOTE — ASSESSMENT & PLAN NOTE
- Patient of Dr. 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 Today is Day +17  - Tacro level 14.3 today yesterday; held morning dose and decreased tacro to 0.5 mg BID.  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 None noted thus far.  - Patient is B+. Donor is A+.  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 Patient will start letermovir ppx on 9/24/24.  - Received fresh BMT product on 9/19/24 with a CD34 dose of 3.55 x10^6.  - Of note, cilostazol may interact with tacro. (Currently on hold for plts < 50K).  - See treatment plan below:    Planned conditioning regimen:  Fludarabine on Day -5, -4, -3, and -2  Melphalan on Day -2  TBI on Day -1     Planned  GVHD Prophylaxis:  Cyclophosphamide (with Mesna) on Days +3 and +4  Mycophenolate starting on Day +5  Tacrolimus starting on Day +5     Antimicrobial Prophylaxis:  Acyclovir starting on Day -5  Levofloxacin starting on Day -1  Micafungin on Day -1 through Day +4  Letermovir starting on Day +5 (if CMV PCR drawn on day 0 results negative)  Posaconazole starting on Day +5  Bactrim starting on Day +30     SOS/VOD Prophylaxis:  Ursodiol on Day -5 through Day +30      Growth Factor Support:  Neupogen starting on Day +5     Caregiver: wife   Post-transplant discharge plans: home

## 2024-10-06 NOTE — PLAN OF CARE
Problem: Adult Inpatient Plan of Care  Goal: Plan of Care Review  Outcome: Progressing  Flowsheets (Taken 10/6/2024 0354)  Plan of Care Reviewed With:   patient   spouse   child     Problem: Infection  Goal: Absence of Infection Signs and Symptoms  Outcome: Progressing  Intervention: Prevent or Manage Infection  Flowsheets (Taken 10/6/2024 0354)  Fever Reduction/Comfort Measures: lightweight bedding  Infection Management: aseptic technique maintained  Isolation Precautions:   precautions maintained   protective

## 2024-10-06 NOTE — SUBJECTIVE & OBJECTIVE
Subjective:     Interval History: Day +17 from a  TBI PT Cy haplo (son) BMT for MDS.  Remains afebrile. VSS. Sitting up in recliner today with daughter and wife at bedside. Increased po intake in the last day. Family would like a walker for better stability.    Objective:     Vital Signs (Most Recent):  Temp: 97.9 °F (36.6 °C) (10/06/24 1124)  Pulse: 94 (10/06/24 1124)  Resp: 18 (10/06/24 1124)  BP: 132/69 (10/06/24 1124)  SpO2: (!) 94 % (10/06/24 1124) Vital Signs (24h Range):  Temp:  [97.8 °F (36.6 °C)-100 °F (37.8 °C)] 97.9 °F (36.6 °C)  Pulse:  [] 94  Resp:  [16-18] 18  SpO2:  [92 %-97 %] 94 %  BP: (113-145)/(53-74) 132/69     Weight: (P) 74.5 kg (164 lb 3.9 oz)  Body mass index is 24.25 kg/m² (pended).  Body surface area is 1.9 meters squared (pended).    ECOG SCORE           [unfilled]    Intake/Output - Last 3 Shifts         10/04 0700  10/05 0659 10/05 0700  10/06 0659 10/06 0700  10/07 0659    P.O. 640 550     I.V. (mL/kg)  1200 (16.1)     Total Intake(mL/kg) 640 (8.6) 1750 (23.5)     Urine (mL/kg/hr) 2175 (1.2) 2700 (1.5)     Stool 0 0     Total Output 2175 2700     Net -1535 -950            Urine Occurrence 1 x      Stool Occurrence 7 x 0 x              Physical Exam  Constitutional:       Appearance: He is well-developed.   HENT:      Head: Normocephalic and atraumatic.      Mouth/Throat:      Pharynx: No oropharyngeal exudate.      Comments: Scab to lower lip  Eyes:      General:         Right eye: No discharge.         Left eye: No discharge.      Conjunctiva/sclera: Conjunctivae normal.      Pupils: Pupils are equal, round, and reactive to light.   Cardiovascular:      Rate and Rhythm: Normal rate and regular rhythm.      Heart sounds: Normal heart sounds. No murmur heard.  Pulmonary:      Effort: Pulmonary effort is normal. No respiratory distress.      Breath sounds: Normal breath sounds. No wheezing or rales.   Abdominal:      General: Bowel sounds are normal.      Palpations: Abdomen is  soft.      Tenderness: There is no abdominal tenderness.   Musculoskeletal:         General: No deformity. Normal range of motion.      Cervical back: Normal range of motion and neck supple.      Right lower leg: Edema (+1) present.      Left lower leg: Edema (+1) present.   Skin:     General: Skin is warm and dry.      Findings: No erythema or rash.      Comments: Right chest wall vas cath. Dressing c/d/i. No sign of infection to site.   Neurological:      Mental Status: He is alert and oriented to person, place, and time.   Psychiatric:         Behavior: Behavior normal.         Thought Content: Thought content normal.         Judgment: Judgment normal.            Significant Labs:   CBC:   Recent Labs   Lab 10/05/24  0403 10/06/24  0538   WBC 0.06* 0.10*   HGB 6.8* 7.0*   HCT 19.0* 20.0*   PLT 10* 20*    and CMP:   Recent Labs   Lab 10/05/24  0403 10/06/24  0538    136   K 4.6 4.3    107   CO2 23 23   * 119*   BUN 9 10   CREATININE 0.7 0.8   CALCIUM 8.5* 8.3*   PROT 5.2* 5.0*   ALBUMIN 2.6* 2.5*   BILITOT 0.8 0.6   ALKPHOS 54* 51*   AST 13 12   ALT 10 10   ANIONGAP 6* 6*       Diagnostic Results:  I have reviewed all pertinent imaging results/findings within the past 24 hours.

## 2024-10-06 NOTE — PROGRESS NOTES
[facility] - Oncology ([facility])  Hematology  Bone Marrow Transplant  Progress Note    Patient Name: Guillaume Salinas  Admission Date: 9/13/2024  Hospital Length of Stay: 23 days  Code Status: Full Code    Subjective:     Interval History: Day +17 from a  TBI PT Cy haplo (son) BMT for MDS.  Remains afebrile. VSS. Sitting up in recliner today with daughter and wife at bedside. Increased po intake in the last day. Family would like a walker for better stability.    Objective:     Vital Signs (Most Recent):  Temp: 97.9 °F (36.6 °C) (10/06/24 1124)  Pulse: 94 (10/06/24 1124)  Resp: 18 (10/06/24 1124)  BP: 132/69 (10/06/24 1124)  SpO2: (!) 94 % (10/06/24 1124) Vital Signs (24h Range):  Temp:  [97.8 °F (36.6 °C)-100 °F (37.8 °C)] 97.9 °F (36.6 °C)  Pulse:  [] 94  Resp:  [16-18] 18  SpO2:  [92 %-97 %] 94 %  BP: (113-145)/(53-74) 132/69     Weight: (P) 74.5 kg (164 lb 3.9 oz)  Body mass index is 24.25 kg/m² (pended).  Body surface area is 1.9 meters squared (pended).    ECOG SCORE           [unfilled]    Intake/Output - Last 3 Shifts         10/04 0700  10/05 0659 10/05 0700  10/06 0659 10/06 0700  10/07 0659    P.O. 640 550     I.V. (mL/kg)  1200 (16.1)     Total Intake(mL/kg) 640 (8.6) 1750 (23.5)     Urine (mL/kg/hr) 2175 (1.2) 2700 (1.5)     Stool 0 0     Total Output 2175 2700     Net -1535 -950            Urine Occurrence 1 x      Stool Occurrence 7 x 0 x              Physical Exam  Constitutional:       Appearance: He is well-developed.   HENT:      Head: Normocephalic and atraumatic.      Mouth/Throat:      Pharynx: No oropharyngeal exudate.      Comments: Scab to lower lip  Eyes:      General:         Right eye: No discharge.         Left eye: No discharge.      Conjunctiva/sclera: Conjunctivae normal.      Pupils: Pupils are equal, round, and reactive to light.   Cardiovascular:      Rate and Rhythm: Normal rate and regular rhythm.      Heart sounds: Normal heart sounds. No murmur  heard.  Pulmonary:      Effort: Pulmonary effort is normal. No respiratory distress.      Breath sounds: Normal breath sounds. No wheezing or rales.   Abdominal:      General: Bowel sounds are normal.      Palpations: Abdomen is soft.      Tenderness: There is no abdominal tenderness.   Musculoskeletal:         General: No deformity. Normal range of motion.      Cervical back: Normal range of motion and neck supple.      Right lower leg: Edema (+1) present.      Left lower leg: Edema (+1) present.   Skin:     General: Skin is warm and dry.      Findings: No erythema or rash.      Comments: Right chest wall vas cath. Dressing c/d/i. No sign of infection to site.   Neurological:      Mental Status: He is alert and oriented to person, place, and time.   Psychiatric:         Behavior: Behavior normal.         Thought Content: Thought content normal.         Judgment: Judgment normal.            Significant Labs:   CBC:   Recent Labs   Lab 10/05/24  0403 10/06/24  0538   WBC 0.06* 0.10*   HGB 6.8* 7.0*   HCT 19.0* 20.0*   PLT 10* 20*    and CMP:   Recent Labs   Lab 10/05/24  0403 10/06/24  0538    136   K 4.6 4.3    107   CO2 23 23   * 119*   BUN 9 10   CREATININE 0.7 0.8   CALCIUM 8.5* 8.3*   PROT 5.2* 5.0*   ALBUMIN 2.6* 2.5*   BILITOT 0.8 0.6   ALKPHOS 54* 51*   AST 13 12   ALT 10 10   ANIONGAP 6* 6*       Diagnostic Results:  I have reviewed all pertinent imaging results/findings within the past 24 hours.  Assessment/Plan:     * S/P allogeneic bone marrow transplant  - Patient of Dr. 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 Today is Day +17  - Tacro level 14.3 today yesterday; held morning dose and decreased tacro to 0.5 mg BID.  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 None noted thus far.  - Patient is B+. Donor is A+.  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 Patient will start letermovir ppx on 9/24/24.  - Received fresh  BMT product on 9/19/24 with a CD34 dose of 3.55 x10^6.  - Of note, cilostazol may interact with tacro. (Currently on hold for plts < 50K).  - See treatment plan below:    Planned conditioning regimen:  Fludarabine on Day -5, -4, -3, and -2  Melphalan on Day -2  TBI on Day -1     Planned  GVHD Prophylaxis:  Cyclophosphamide (with Mesna) on Days +3 and +4  Mycophenolate starting on Day +5  Tacrolimus starting on Day +5     Antimicrobial Prophylaxis:  Acyclovir starting on Day -5  Levofloxacin starting on Day -1  Micafungin on Day -1 through Day +4  Letermovir starting on Day +5 (if CMV PCR drawn on day 0 results negative)  Posaconazole starting on Day +5  Bactrim starting on Day +30     SOS/VOD Prophylaxis:  Ursodiol on Day -5 through Day +30      Growth Factor Support:  Neupogen starting on Day +5     Caregiver: wife   Post-transplant discharge plans: home        Hypomagnesemia  - See electrolyte disorder  - Tacro likely contributing.    Electrolyte disorder  - Replete per PRN electrolyte order set  - Daily CMP, mag, and phos while inpatient    Moderate malnutrition  Nutrition consulted. Most recent weight and BMI monitored-     Measurements:  Wt Readings from Last 1 Encounters:   10/05/24 74.7 kg (164 lb 10.9 oz)   Body mass index is 24.32 kg/m².    Patient has been screened and assessed by RD.    - Switched boost plus to boost breeze as diary of plus making patient nauseous  - continue IVF; rate increased to 100ml/hr  - PO intake as tolerated; on scheduled antiemetics for nausea      Dry mouth  - continue to moisten lips with chapstick  - encouraged use of oral hygiene rinses to sterilize mouth and stimulate saliva production    Urinary frequency  - reports increased nightly frequency and urgency with low output  - no formal BPH diagnosis; started flomax 9/27  - monitor for improvement (can increase dose if not effective)    Chemotherapy-induced nausea  - scheduled zofran IV 8mg q8h on 9/27; added scheduled  compazine 9/30  - has prn ativan    Hemorrhoids  - d/t chemo induced diarrhea  - has anusol, tucks pads, and LETS gel  - patient denies any bleeding at site  - has PRN morphine for pain control  - hemorrhoid pain improving with diarrhea control    Headache  - C/o headache 9/25  - Daily coffee drinker. Improved after drinking coffee.  - Suspect caffeine withdrawal.  - Can start caffeine tablet if patient is unable to drink coffee due to chemo side effects  - none today    Chemotherapy induced diarrhea  - C-diff neg 9/24  - Of note, was treated empirically for c-diff with oral vanc prior to admission and is receiving oral vanc ppx while admitted.  - Receiving scheduled imodium and PRN lomotil  - Started Questran 10/1.  - Diarrhea improved, so changied imodium from scheduled to PRN    Neutropenic fever  - First fever with tmax 101.8F on 9/23  - Infection w/u unremarkable thus far  - No fevers since 9/23, Cefepime stopped on 9/25 and back on oral ppx LVQ.  RESOLVED    Insomnia  - has PRN Trazodone however not currently using  - started on scheduled melatonin    History of Clostridioides difficile infection  - Was treated empirically for c-diff with oral vanc prior to admission.  - Continue oral vanc ppx per transplant protocol      Neuropathy  - Continue home gabapentin    Pancytopenia due to antineoplastic chemotherapy  - Daily CBC while inpatient  - Transfuse for hgb <7 Plt  or <10K  - Continue antimicrobial ppx  - Holding cilostazol for plts < 50K    Myelodysplastic syndrome  From Dr Hickey's clinic note 8/5:  Stem cell transplant candidate: We discussed the role for allogeneic stem cell transplantation in this disease process as a potentially curative option. We had an extensive discussion about the rationale, logistics, risks, and benefits. We reviewed the requirement to stay in the [place] area for 100 days with a caregiver at all times. We discussed the risks, including infection, graft failure, organ  toxicity, graft versus host disease, relapse of disease, and secondary cancers. We reviewed the need for long-term immunosuppression and need for close monitoring. HCT-CI of 1 (intermediate risk). We had a prolonged discussion today regarding his recent deconditioning, Cdiff, and underlying disease. He is now much improved since last seen, and is improving his strength since starting to work with PT. Diarrhea now controlled. We discussed that our plan to move forward with the transplant process.   Coordinator: Fay Stephens  Regimen:  + 2Gy TBI  Donor: son (haplo)   Graft source: BM  - Admitted 9/13/24 for a  TBI PT Cy haplo (son) allogeneic BMT    Hypertension, essential  - Was holding home Coreg for fluid responsive hypotension. Resumed 9/30.    Coronary artery disease involving native coronary artery of native heart without angina pectoris  - Was holding home Coreg for hypotension. Resumed 9/30.        VTE Risk Mitigation (From admission, onward)           Ordered     heparin, porcine (PF) 100 unit/mL injection flush 300 Units  As needed (PRN)         09/13/24 9814                    Disposition: Will remain inpatient through engraftment    Mayuri Michel MD  Bone Marrow Transplant  [facility]dy - Oncology ([facility])

## 2024-10-07 LAB
ALBUMIN SERPL BCP-MCNC: 2.4 G/DL (ref 3.5–5.2)
ALP SERPL-CCNC: 48 U/L (ref 55–135)
ALT SERPL W/O P-5'-P-CCNC: 9 U/L (ref 10–44)
ANION GAP SERPL CALC-SCNC: 4 MMOL/L (ref 8–16)
ANISOCYTOSIS BLD QL SMEAR: SLIGHT
AST SERPL-CCNC: 11 U/L (ref 10–40)
BASOPHILS # BLD AUTO: 0 K/UL (ref 0–0.2)
BASOPHILS NFR BLD: 0 % (ref 0–1.9)
BILIRUB SERPL-MCNC: 0.5 MG/DL (ref 0.1–1)
BUN SERPL-MCNC: 9 MG/DL (ref 8–23)
CALCIUM SERPL-MCNC: 8 MG/DL (ref 8.7–10.5)
CHLORIDE SERPL-SCNC: 112 MMOL/L (ref 95–110)
CO2 SERPL-SCNC: 23 MMOL/L (ref 23–29)
CREAT SERPL-MCNC: 0.6 MG/DL (ref 0.5–1.4)
DIFFERENTIAL METHOD BLD: ABNORMAL
EOSINOPHIL # BLD AUTO: 0 K/UL (ref 0–0.5)
EOSINOPHIL NFR BLD: 0 % (ref 0–8)
ERYTHROCYTE [DISTWIDTH] IN BLOOD BY AUTOMATED COUNT: 13.1 % (ref 11.5–14.5)
EST. GFR  (NO RACE VARIABLE): >60 ML/MIN/1.73 M^2
GLUCOSE SERPL-MCNC: 121 MG/DL (ref 70–110)
HCT VFR BLD AUTO: 19.8 % (ref 40–54)
HGB BLD-MCNC: 7.1 G/DL (ref 14–18)
IMM GRANULOCYTES # BLD AUTO: 0 K/UL (ref 0–0.04)
IMM GRANULOCYTES NFR BLD AUTO: 0 % (ref 0–0.5)
LYMPHOCYTES # BLD AUTO: 0 K/UL (ref 1–4.8)
LYMPHOCYTES NFR BLD: 0 % (ref 18–48)
MAGNESIUM SERPL-MCNC: 1.5 MG/DL (ref 1.6–2.6)
MCH RBC QN AUTO: 29.8 PG (ref 27–31)
MCHC RBC AUTO-ENTMCNC: 35.9 G/DL (ref 32–36)
MCV RBC AUTO: 83 FL (ref 82–98)
MONOCYTES # BLD AUTO: 0 K/UL (ref 0.3–1)
MONOCYTES NFR BLD: 11.8 % (ref 4–15)
NEUTROPHILS # BLD AUTO: 0.2 K/UL (ref 1.8–7.7)
NEUTROPHILS NFR BLD: 88.2 % (ref 38–73)
NRBC BLD-RTO: 0 /100 WBC
OVALOCYTES BLD QL SMEAR: ABNORMAL
PHOSPHATE SERPL-MCNC: 2.7 MG/DL (ref 2.7–4.5)
PLATELET # BLD AUTO: 12 K/UL (ref 150–450)
PLATELET BLD QL SMEAR: ABNORMAL
PMV BLD AUTO: 11.3 FL (ref 9.2–12.9)
POIKILOCYTOSIS BLD QL SMEAR: SLIGHT
POLYCHROMASIA BLD QL SMEAR: ABNORMAL
POTASSIUM SERPL-SCNC: 4.9 MMOL/L (ref 3.5–5.1)
PROT SERPL-MCNC: 4.7 G/DL (ref 6–8.4)
RBC # BLD AUTO: 2.38 M/UL (ref 4.6–6.2)
SODIUM SERPL-SCNC: 139 MMOL/L (ref 136–145)
TACROLIMUS BLD-MCNC: 8.8 NG/ML (ref 5–15)
WBC # BLD AUTO: 0.17 K/UL (ref 3.9–12.7)

## 2024-10-07 PROCEDURE — 63600175 PHARM REV CODE 636 W HCPCS: Performed by: NURSE PRACTITIONER

## 2024-10-07 PROCEDURE — 25000003 PHARM REV CODE 250: Performed by: INTERNAL MEDICINE

## 2024-10-07 PROCEDURE — 97530 THERAPEUTIC ACTIVITIES: CPT

## 2024-10-07 PROCEDURE — 20600001 HC STEP DOWN PRIVATE ROOM

## 2024-10-07 PROCEDURE — 97110 THERAPEUTIC EXERCISES: CPT

## 2024-10-07 PROCEDURE — 83735 ASSAY OF MAGNESIUM: CPT | Performed by: NURSE PRACTITIONER

## 2024-10-07 PROCEDURE — 80053 COMPREHEN METABOLIC PANEL: CPT | Performed by: NURSE PRACTITIONER

## 2024-10-07 PROCEDURE — 25000003 PHARM REV CODE 250: Performed by: NURSE PRACTITIONER

## 2024-10-07 PROCEDURE — 85025 COMPLETE CBC W/AUTO DIFF WBC: CPT | Performed by: NURSE PRACTITIONER

## 2024-10-07 PROCEDURE — 63600175 PHARM REV CODE 636 W HCPCS: Performed by: INTERNAL MEDICINE

## 2024-10-07 PROCEDURE — 84100 ASSAY OF PHOSPHORUS: CPT | Performed by: NURSE PRACTITIONER

## 2024-10-07 PROCEDURE — 80197 ASSAY OF TACROLIMUS: CPT | Performed by: INTERNAL MEDICINE

## 2024-10-07 RX ORDER — LANOLIN ALCOHOL/MO/W.PET/CERES
400 CREAM (GRAM) TOPICAL 2 TIMES DAILY
Status: DISCONTINUED | OUTPATIENT
Start: 2024-10-07 | End: 2024-10-08

## 2024-10-07 RX ADMIN — Medication 1 DOSE: at 09:10

## 2024-10-07 RX ADMIN — LETERMOVIR 480 MG: 480 TABLET, FILM COATED ORAL at 09:10

## 2024-10-07 RX ADMIN — SODIUM CHLORIDE AND POTASSIUM CHLORIDE: .9; .15 SOLUTION INTRAVENOUS at 04:10

## 2024-10-07 RX ADMIN — Medication 1 DOSE: at 01:10

## 2024-10-07 RX ADMIN — TAMSULOSIN HYDROCHLORIDE 0.4 MG: 0.4 CAPSULE ORAL at 09:10

## 2024-10-07 RX ADMIN — CARVEDILOL 6.25 MG: 6.25 TABLET, FILM COATED ORAL at 09:10

## 2024-10-07 RX ADMIN — VANCOMYCIN HYDROCHLORIDE 125 MG: KIT at 09:10

## 2024-10-07 RX ADMIN — CHOLESTYRAMINE 4 G: 4 POWDER, FOR SUSPENSION ORAL at 09:10

## 2024-10-07 RX ADMIN — PROCHLORPERAZINE EDISYLATE 5 MG: 5 INJECTION INTRAMUSCULAR; INTRAVENOUS at 06:10

## 2024-10-07 RX ADMIN — Medication: at 09:10

## 2024-10-07 RX ADMIN — LIDOCAINE 5% 1 PATCH: 700 PATCH TOPICAL at 09:10

## 2024-10-07 RX ADMIN — PROCHLORPERAZINE EDISYLATE 5 MG: 5 INJECTION INTRAMUSCULAR; INTRAVENOUS at 01:10

## 2024-10-07 RX ADMIN — Medication 400 MG: at 10:10

## 2024-10-07 RX ADMIN — Medication 1 DOSE: at 05:10

## 2024-10-07 RX ADMIN — TACROLIMUS 0.5 MG: 0.5 CAPSULE ORAL at 09:10

## 2024-10-07 RX ADMIN — URSODIOL 300 MG: 300 CAPSULE ORAL at 09:10

## 2024-10-07 RX ADMIN — ONDANSETRON 8 MG: 2 INJECTION INTRAMUSCULAR; INTRAVENOUS at 09:10

## 2024-10-07 RX ADMIN — Medication 6 MG: at 09:10

## 2024-10-07 RX ADMIN — MYCOPHENOLATE MOFETIL 1000 MG: 250 CAPSULE ORAL at 09:10

## 2024-10-07 RX ADMIN — Medication 400 MG: at 05:10

## 2024-10-07 RX ADMIN — PROCHLORPERAZINE EDISYLATE 5 MG: 5 INJECTION INTRAMUSCULAR; INTRAVENOUS at 05:10

## 2024-10-07 RX ADMIN — Medication 400 MG: at 09:10

## 2024-10-07 RX ADMIN — HYDROCORTISONE: 25 CREAM TOPICAL at 09:10

## 2024-10-07 RX ADMIN — Medication 400 MG: at 02:10

## 2024-10-07 RX ADMIN — FILGRASTIM 300 MCG: 300 INJECTION, SOLUTION INTRAVENOUS; SUBCUTANEOUS at 09:10

## 2024-10-07 RX ADMIN — POSACONAZOLE 300 MG: 100 TABLET, DELAYED RELEASE ORAL at 09:10

## 2024-10-07 RX ADMIN — LOPERAMIDE HYDROCHLORIDE 2 MG: 2 CAPSULE ORAL at 09:10

## 2024-10-07 RX ADMIN — PROCHLORPERAZINE EDISYLATE 5 MG: 5 INJECTION INTRAMUSCULAR; INTRAVENOUS at 12:10

## 2024-10-07 RX ADMIN — VANCOMYCIN HYDROCHLORIDE 125 MG: KIT at 10:10

## 2024-10-07 RX ADMIN — PANTOPRAZOLE SODIUM 40 MG: 40 TABLET, DELAYED RELEASE ORAL at 09:10

## 2024-10-07 RX ADMIN — GABAPENTIN 600 MG: 300 CAPSULE ORAL at 09:10

## 2024-10-07 RX ADMIN — ACYCLOVIR 800 MG: 200 CAPSULE ORAL at 09:10

## 2024-10-07 RX ADMIN — MYCOPHENOLATE MOFETIL 1000 MG: 250 CAPSULE ORAL at 03:10

## 2024-10-07 RX ADMIN — TRAMADOL HYDROCHLORIDE 50 MG: 50 TABLET, COATED ORAL at 09:10

## 2024-10-07 RX ADMIN — LEVOFLOXACIN 500 MG: 500 TABLET, FILM COATED ORAL at 09:10

## 2024-10-07 RX ADMIN — ONDANSETRON 8 MG: 2 INJECTION INTRAMUSCULAR; INTRAVENOUS at 03:10

## 2024-10-07 RX ADMIN — TACROLIMUS 0.5 MG: 0.5 CAPSULE ORAL at 06:10

## 2024-10-07 RX ADMIN — PROCHLORPERAZINE EDISYLATE 5 MG: 5 INJECTION INTRAMUSCULAR; INTRAVENOUS at 11:10

## 2024-10-07 RX ADMIN — ONDANSETRON 8 MG: 2 INJECTION INTRAMUSCULAR; INTRAVENOUS at 05:10

## 2024-10-07 NOTE — ASSESSMENT & PLAN NOTE
From Dr Hickey's clinic note 8/5:  Stem cell transplant candidate: We discussed the role for allogeneic stem cell transplantation in this disease process as a potentially curative option. We had an extensive discussion about the rationale, logistics, risks, and benefits. We reviewed the requirement to stay in the [place] area for 100 days with a caregiver at all times. We discussed the risks, including infection, graft failure, organ toxicity, graft versus host disease, relapse of disease, and secondary cancers. We reviewed the need for long-term immunosuppression and need for close monitoring. HCT-CI of 1 (intermediate risk). We had a prolonged discussion today regarding his recent deconditioning, Cdiff, and underlying disease. He is now much improved since last seen, and is improving his strength since starting to work with PT. Diarrhea now controlled. We discussed that our plan to move forward with the transplant process.   Coordinator: Fay Stephens  Regimen:  + 2Gy TBI  Donor: son (haplo)   Graft source: BM  - Admitted 9/13/24 for a  TBI PT Cy haplo (son) allogeneic BMT

## 2024-10-07 NOTE — ASSESSMENT & PLAN NOTE
- See electrolyte disorder  - Tacro likely contributing  - will need scheduled magnesium on discharge

## 2024-10-07 NOTE — PROGRESS NOTES
Janusz Ma - Oncology ([facility])  Hematology  Bone Marrow Transplant  Progress Note    Patient Name: Guillaume Salinas  Admission Date: 9/13/2024  Hospital Length of Stay: 24 days  Code Status: Full Code    Subjective:     Interval History: Day +18 s/p  + TBI + PT Cy Haploidentical (son) BMT for MDS. Tacro level stable at 8.8. Continues on 0.5mg BID.  today. Still with diarrhea although much improved. Continues with bilateral shoulder pain. Up 5lbs in last 24hrs so IVF rate reduced. Replacing mag. Afebrile, VSS    Objective:     Vital Signs (Most Recent):  Temp: 98.3 °F (36.8 °C) (10/07/24 0744)  Pulse: 93 (10/07/24 0744)  Resp: 16 (10/07/24 0744)  BP: (!) 140/64 (10/07/24 0744)  SpO2: 95 % (10/07/24 0744) Vital Signs (24h Range):  Temp:  [98 °F (36.7 °C)-98.9 °F (37.2 °C)] 98.3 °F (36.8 °C)  Pulse:  [86-93] 93  Resp:  [16-18] 16  SpO2:  [95 %-97 %] 95 %  BP: (126-140)/(64-77) 140/64     Weight: 76.8 kg (169 lb 3.3 oz)  Body mass index is 24.99 kg/m².  Body surface area is 1.93 meters squared.      Intake/Output - Last 3 Shifts         10/05 0700  10/06 0659 10/06 0700  10/07 0659 10/07 0700  10/08 0659    P.O. 550 400     I.V. (mL/kg) 1200 (16.1) 2188.8 (28.5)     Total Intake(mL/kg) 1750 (23.5) 2588.8 (33.8)     Urine (mL/kg/hr) 2700 (1.5) 1350 (0.7)     Stool 0 0     Total Output 2700 1350     Net -950 +1238.8            Stool Occurrence 0 x 0 x              Physical Exam  Vitals and nursing note reviewed.   Constitutional:       Appearance: Normal appearance. He is well-developed.   HENT:      Head: Normocephalic and atraumatic.      Nose: Nose normal.      Mouth/Throat:      Pharynx: No oropharyngeal exudate or posterior oropharyngeal erythema.   Eyes:      General:         Right eye: No discharge.         Left eye: No discharge.      Extraocular Movements: Extraocular movements intact.      Conjunctiva/sclera: Conjunctivae normal.   Cardiovascular:      Rate and Rhythm: Normal rate and  regular rhythm.      Heart sounds: Normal heart sounds. No murmur heard.  Pulmonary:      Effort: Pulmonary effort is normal. No respiratory distress.      Breath sounds: Normal breath sounds. No wheezing or rales.   Abdominal:      General: Bowel sounds are normal. There is no distension.      Palpations: Abdomen is soft.      Tenderness: There is no abdominal tenderness.   Musculoskeletal:         General: No deformity. Normal range of motion.      Cervical back: Normal range of motion and neck supple.      Right lower leg: No edema.      Left lower leg: No edema.   Skin:     General: Skin is warm and dry.      Findings: Bruising present. No erythema or rash.      Comments: Right chest wall vas cath. Dressing c/d/i. No sign of infection to site.   Neurological:      General: No focal deficit present.      Mental Status: He is alert and oriented to person, place, and time.      Motor: No weakness.   Psychiatric:         Mood and Affect: Mood normal.         Behavior: Behavior normal.         Thought Content: Thought content normal.         Judgment: Judgment normal.            Significant Labs:   CBC:   Recent Labs   Lab 10/06/24  0538 10/07/24  0436   WBC 0.10* 0.17*   HGB 7.0* 7.1*   HCT 20.0* 19.8*   PLT 20* 12*    and CMP:   Recent Labs   Lab 10/06/24  0538 10/07/24  0436    139   K 4.3 4.9    112*   CO2 23 23   * 121*   BUN 10 9   CREATININE 0.8 0.6   CALCIUM 8.3* 8.0*   PROT 5.0* 4.7*   ALBUMIN 2.5* 2.4*   BILITOT 0.6 0.5   ALKPHOS 51* 48*   AST 12 11   ALT 10 9*   ANIONGAP 6* 4*       Diagnostic Results:  I have reviewed all pertinent imaging results/findings within the past 24 hours.  Assessment/Plan:     * S/P allogeneic bone marrow transplant  - Patient of Dr. 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 Today is Day +18  - Tacro level 8.8 today; continues on 0.5 mg BID. Next level 10/9  -Continue daily acute GVHD charting while inpatient.  None noted thus far.  - Patient is B+. Donor is A+.  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 Patient will start letermovir ppx on 9/24/24.  - Received fresh BMT product on 9/19/24 with a CD34 dose of 3.55 x10^6.  - Of note, cilostazol may interact with tacro. (Currently on hold for plts < 50K).  - See treatment plan below:    Planned conditioning regimen:  Fludarabine on Day -5, -4, -3, and -2  Melphalan on Day -2  TBI on Day -1     Planned  GVHD Prophylaxis:  Cyclophosphamide (with Mesna) on Days +3 and +4  Mycophenolate starting on Day +5  Tacrolimus starting on Day +5     Antimicrobial Prophylaxis:  Acyclovir starting on Day -5  Levofloxacin starting on Day -1  Micafungin on Day -1 through Day +4  Letermovir starting on Day +5 (if CMV PCR drawn on day 0 results negative)  Posaconazole starting on Day +5  Bactrim starting on Day +30     SOS/VOD Prophylaxis:  Ursodiol on Day -5 through Day +30      Growth Factor Support:  Neupogen starting on Day +5     Caregiver: wife   Post-transplant discharge plans: home        Myelodysplastic syndrome  From Dr Hickey's clinic note 8/5:  Stem cell transplant candidate: We discussed the role for allogeneic stem cell transplantation in this disease process as a potentially curative option. We had an extensive discussion about the rationale, logistics, risks, and benefits. We reviewed the requirement to stay in the [place] area for 100 days with a caregiver at all times. We discussed the risks, including infection, graft failure, organ toxicity, graft versus host disease, relapse of disease, and secondary cancers. We reviewed the need for long-term immunosuppression and need for close monitoring. HCT-CI of 1 (intermediate risk). We had a prolonged discussion today regarding his recent deconditioning, Cdiff, and underlying disease. He is now much improved since last seen, and is improving his strength since starting to work with PT. Diarrhea now controlled. We  discussed that our plan to move forward with the transplant process.   Coordinator: Fay Stephens  Regimen:  + 2Gy TBI  Donor: son (haplo)   Graft source: BM  - Admitted 9/13/24 for a  TBI PT Cy haplo (son) allogeneic BMT    Pancytopenia due to antineoplastic chemotherapy  - Daily CBC while inpatient  - Transfuse for hgb <7 Plt  or <10K  - Continue antimicrobial ppx  - Holding cilostazol for plts < 50K   today    Chemotherapy induced diarrhea  - C-diff neg 9/24  - Of note, was treated empirically for c-diff with oral vanc prior to admission and is receiving oral vanc ppx while admitted.  - Receiving scheduled imodium and PRN lomotil  - Started Questran 10/1.  - Diarrhea improved, so changied imodium from scheduled to PRN    Hemorrhoids  - d/t chemo induced diarrhea  - has anusol, tucks pads, and LETS gel  - patient denies any bleeding at site  - has PRN morphine for pain control  - hemorrhoid pain improving with diarrhea control    Neutropenic fever  - First fever with tmax 101.8F on 9/23  - Infection w/u unremarkable thus far  - No fevers since 9/23, Cefepime stopped on 9/25 and back on oral ppx LVQ.  RESOLVED    Hypomagnesemia  - See electrolyte disorder  - Tacro likely contributing  - will need scheduled magnesium on discharge    Electrolyte disorder  - Replete per PRN electrolyte order set  - Daily CMP, mag, and phos while inpatient    Moderate malnutrition  Nutrition consulted. Most recent weight and BMI monitored-     Measurements:  Wt Readings from Last 1 Encounters:   10/07/24 76.8 kg (169 lb 3.3 oz)   Body mass index is 24.99 kg/m².    Patient has been screened and assessed by RD.    - Switched boost plus to boost breeze as diary of plus making patient nauseous  - continue IVF; rate increased to 100ml/hr  - PO intake as tolerated; on scheduled antiemetics for nausea      Dry mouth  - continue to moisten lips with chapstick  - encouraged use of oral hygiene rinses to sterilize mouth and  stimulate saliva production    Urinary frequency  - reports increased nightly frequency and urgency with low output  - no formal BPH diagnosis; started flomax 9/27  - improved    Chemotherapy-induced nausea  - scheduled zofran IV 8mg q8h on 9/27; added scheduled compazine 9/30  - has prn ativan    Headache  - C/o headache 9/25  - Daily coffee drinker. Improved after drinking coffee.  - Suspect caffeine withdrawal.  - Can start caffeine tablet if patient is unable to drink coffee due to chemo side effects  - none today    Insomnia  - has PRN Trazodone however not currently using  - started on scheduled melatonin    History of Clostridioides difficile infection  - Was treated empirically for c-diff with oral vanc prior to admission.  - Continue oral vanc ppx per transplant protocol      Neuropathy  - Continue home gabapentin    Hypertension, essential  - Was holding home Coreg for fluid responsive hypotension. Resumed 9/30.    Coronary artery disease involving native coronary artery of native heart without angina pectoris  - Was holding home Coreg for hypotension. Resumed 9/30.        VTE Risk Mitigation (From admission, onward)           Ordered     heparin, porcine (PF) 100 unit/mL injection flush 300 Units  As needed (PRN)         09/13/24 8020                    Disposition: Remains inpatient pending engraftment    Judy Anthony NP  Bone Marrow Transplant  Janusz Ma - Oncology ([facility])
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
## 2024-10-07 NOTE — ASSESSMENT & PLAN NOTE
- Patient of Dr. 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 Today is Day +18  - Tacro level 8.8 today; continues on 0.5 mg BID. Next level 10/9  -Continue daily acute GVHD charting while inpatient. None noted thus far.  - Patient is B+. Donor is A+.  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 Patient will start letermovir ppx on 9/24/24.  - Received fresh BMT product on 9/19/24 with a CD34 dose of 3.55 x10^6.  - Of note, cilostazol may interact with tacro. (Currently on hold for plts < 50K).  - See treatment plan below:    Planned conditioning regimen:  Fludarabine on Day -5, -4, -3, and -2  Melphalan on Day -2  TBI on Day -1     Planned  GVHD Prophylaxis:  Cyclophosphamide (with Mesna) on Days +3 and +4  Mycophenolate starting on Day +5  Tacrolimus starting on Day +5     Antimicrobial Prophylaxis:  Acyclovir starting on Day -5  Levofloxacin starting on Day -1  Micafungin on Day -1 through Day +4  Letermovir starting on Day +5 (if CMV PCR drawn on day 0 results negative)  Posaconazole starting on Day +5  Bactrim starting on Day +30     SOS/VOD Prophylaxis:  Ursodiol on Day -5 through Day +30      Growth Factor Support:  Neupogen starting on Day +5     Caregiver: wife   Post-transplant discharge plans: home

## 2024-10-07 NOTE — ASSESSMENT & PLAN NOTE
- reports increased nightly frequency and urgency with low output  - no formal BPH diagnosis; started flomax 9/27  - improved

## 2024-10-07 NOTE — ASSESSMENT & PLAN NOTE
Nutrition consulted. Most recent weight and BMI monitored-     Measurements:  Wt Readings from Last 1 Encounters:   10/07/24 76.8 kg (169 lb 3.3 oz)   Body mass index is 24.99 kg/m².    Patient has been screened and assessed by RD.    - Switched boost plus to boost breeze as diary of plus making patient nauseous  - continue IVF; rate increased to 100ml/hr  - PO intake as tolerated; on scheduled antiemetics for nausea

## 2024-10-07 NOTE — PLAN OF CARE
Problem: Adult Inpatient Plan of Care  Goal: Optimal Comfort and Wellbeing  Outcome: Progressing  Intervention: Monitor Pain and Promote Comfort  Flowsheets (Taken 10/7/2024 0459)  Pain Management Interventions:   care clustered   medication offered   pain management plan reviewed with patient/caregiver   pillow support provided   position adjusted   quiet environment facilitated     Problem: Infection  Goal: Absence of Infection Signs and Symptoms  Outcome: Progressing  Intervention: Prevent or Manage Infection  Flowsheets (Taken 10/7/2024 0459)  Infection Management: aseptic technique maintained  Isolation Precautions:   precautions maintained   protective     Assumed care of pt  [id number]  Patient involved in plan of care and communication needs throughout shift        -Dx: Myelodysplastic syndrome  -AAOx4 Bahamian speaking   -c/o shoulder pain relieved by tramadol    -Assist x 1 with transfers/up in chair x 1 this shift  -Regular diet  -Remains afebrile  -Voids via urinal at bedside  -No BM this shift   -RA  -Skin intact       **Family/patient requesting a walker        -q 2 hour patient rounds. VSS , no acute events so far this shift. Daughter and wife remain at bedside. Non-skid socks when out of bed. Bed locked and in lowest position. IV fluids infusing @ 100 ml/hr as ordered. Call light within reach as well as all belongings. Instructed to call for assistance before getting out of bed, verbalized understanding. Will continue to monitor.

## 2024-10-07 NOTE — PLAN OF CARE
Pt alert and oriented x4. Pt up to chair with assistance. PT/OT worked with pt without incident.Continuous NS w/K+ reduced to 50 ml/hr. One episode of diarrhea with relief obtained with Imodium. No c/o n/v or pain or headache. Afebrile throughout shift. Bed remains in lowest locked position with call light within reach. Plan of care reviewed.

## 2024-10-07 NOTE — ASSESSMENT & PLAN NOTE
- Daily CBC while inpatient  - Transfuse for hgb <7 Plt  or <10K  - Continue antimicrobial ppx  - Holding cilostazol for plts < 50K   today

## 2024-10-07 NOTE — PT/OT/SLP PROGRESS
Occupational Therapy   Treatment  Spouse translated session on this date per spouse and pt. Request.     Name: Guillaume Salinas  MRN: [id number]  Admitting Diagnosis:  S/P allogeneic bone marrow transplant       Recommendations:     Discharge Recommendations: Low Intensity Therapy  Discharge Equipment Recommendations:  walker, rolling  Barriers to discharge:  None    Assessment:     Guillaume Salinas is a 69 y.o. male with a medical diagnosis of S/P allogeneic bone marrow transplant.  He presents with deficits in self-care tasks as well as mobility. Pain reported in pt. Right shoulder on this date. Pt. Participated well in session. Cues required for proper hand placement for transfers. Patient would benefit from continued OT services to maximize level of safety and independence with self-care tasks.   Performance deficits affecting function are weakness, impaired endurance, impaired self care skills, impaired functional mobility, gait instability, decreased lower extremity function, decreased upper extremity function, decreased ROM.     Rehab Prognosis:  Good; patient would benefit from acute skilled OT services to address these deficits and reach maximum level of function.       Plan:     Patient to be seen 2 x/week to address the above listed problems via self-care/home management, therapeutic activities, therapeutic exercises, neuromuscular re-education  Plan of Care Expires: 10/15/24  Plan of Care Reviewed with: patient, spouse    Subjective     Chief Complaint: Pt. With no specific complaints on this date  Patient/Family Comments/goals: Pt. Participated as well in session   Pain/Comfort:  Pain Rating 1:  (did not rate)  Location - Side 1: Left  Location 1: shoulder  Pain Addressed 1: Reposition, Distraction  Pain Rating Post-Intervention 1: 0/10    Objective:     Communicated with: nurse prior to session.  Nurse approved session as pt. H&H chronically low. Patient found supine with central line  upon OT entry to room.    General Precautions: Standard, fall (Burkinan speaking)    Orthopedic Precautions:N/A  Braces: N/A  Respiratory Status: Room air     Occupational Performance:     Bed Mobility:    Patient completed Supine to Sit with Min assistance     Functional Mobility/Transfers:  Patient completed Sit <> Stand Transfer with minimum assistance  with  rolling walker and cues for proper hand placement required from EOB x 2 trials and from toilet x 1 trial   Patient completed Toilet Transfer Stand Pivot technique with contact guard assistance with  rolling walker  Functional Mobility: pt. Performed functional mobility within room only with CGA and RW    Activities of Daily Living:  Not performed on this date      [facility] 6 Click ADL: 20    Treatment & Education:  Pt. Educated on role of OT and POC  Pt. Performed AROM there ex x 1 set 15 reps for all major planes of BLE and RUE ROM limited to 90 degrees  seated EOB. Pt. Performed AAROM to LUE for all major planes of motion  Education provided on positioning of LUE on pillow when sitting up in chair to assist with comfort    Patient left up in chair with all lines intact, call button in reach, and spouse present    GOALS:   Multidisciplinary Problems       Occupational Therapy Goals          Problem: Occupational Therapy    Goal Priority Disciplines Outcome Interventions   Occupational Therapy Goal     OT, PT/OT Progressing    Description: Goals to be met by: 10/29/2024     Patient will increase functional independence with ADLs by performing:    Pt will demonstrate independence with grooming x 3 sessions.  Pt will demonstrate independence with UE dressing x 3 sessions.  Pt will demonstrate independence with LE dressing x 3 sessions.  Pt will demonstrate independence with Toileting x 3 sessions.  Pt will demonstrate independence with transfers x 3 sessions.  Pt will complete functional mobility to simulate community distances with [place] x 3 sessions in  order to maximize functional activity tolerance required for occupations of choice.   Pt will demonstrate independence with HEP for UE strengthening/endurance with independence.                           Time Tracking:     OT Date of Treatment: 10/07/24  OT Start Time: 1401  OT Stop Time: 1424  OT Total Time (min): 23 min    Billable Minutes:Therapeutic Activity 8  Therapeutic Exercise 15    OT/DIMITRIS: OT     Number of DIMITRIS visits since last OT visit: 0    10/7/2024
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
## 2024-10-08 PROBLEM — R53.81 PHYSICAL DEBILITY: Status: ACTIVE | Noted: 2021-11-08

## 2024-10-08 LAB
ABO + RH BLD: NORMAL
ALBUMIN SERPL BCP-MCNC: 2.8 G/DL (ref 3.5–5.2)
ALP SERPL-CCNC: 56 U/L (ref 55–135)
ALT SERPL W/O P-5'-P-CCNC: 11 U/L (ref 10–44)
ANION GAP SERPL CALC-SCNC: 7 MMOL/L (ref 8–16)
ANISOCYTOSIS BLD QL SMEAR: SLIGHT
AST SERPL-CCNC: 15 U/L (ref 10–40)
BASOPHILS # BLD AUTO: ABNORMAL K/UL (ref 0–0.2)
BASOPHILS NFR BLD: 0 % (ref 0–1.9)
BILIRUB SERPL-MCNC: 0.7 MG/DL (ref 0.1–1)
BLD GP AB SCN CELLS X3 SERPL QL: NORMAL
BLD PROD TYP BPU: NORMAL
BLOOD UNIT EXPIRATION DATE: NORMAL
BLOOD UNIT TYPE CODE: 9500
BLOOD UNIT TYPE: NORMAL
BUN SERPL-MCNC: 8 MG/DL (ref 8–23)
CALCIUM SERPL-MCNC: 8.7 MG/DL (ref 8.7–10.5)
CHLORIDE SERPL-SCNC: 108 MMOL/L (ref 95–110)
CO2 SERPL-SCNC: 24 MMOL/L (ref 23–29)
CODING SYSTEM: NORMAL
CREAT SERPL-MCNC: 0.7 MG/DL (ref 0.5–1.4)
CROSSMATCH INTERPRETATION: NORMAL
DIFFERENTIAL METHOD BLD: ABNORMAL
DISPENSE STATUS: NORMAL
EOSINOPHIL # BLD AUTO: ABNORMAL K/UL (ref 0–0.5)
EOSINOPHIL NFR BLD: 0 % (ref 0–8)
ERYTHROCYTE [DISTWIDTH] IN BLOOD BY AUTOMATED COUNT: 12.9 % (ref 11.5–14.5)
EST. GFR  (NO RACE VARIABLE): >60 ML/MIN/1.73 M^2
GLUCOSE SERPL-MCNC: 116 MG/DL (ref 70–110)
HCT VFR BLD AUTO: 21.3 % (ref 40–54)
HGB BLD-MCNC: 7.3 G/DL (ref 14–18)
HYPOCHROMIA BLD QL SMEAR: ABNORMAL
IMM GRANULOCYTES # BLD AUTO: ABNORMAL K/UL (ref 0–0.04)
IMM GRANULOCYTES NFR BLD AUTO: ABNORMAL % (ref 0–0.5)
LYMPHOCYTES # BLD AUTO: ABNORMAL K/UL (ref 1–4.8)
LYMPHOCYTES NFR BLD: 4.8 % (ref 18–48)
MAGNESIUM SERPL-MCNC: 1.2 MG/DL (ref 1.6–2.6)
MCH RBC QN AUTO: 28.9 PG (ref 27–31)
MCHC RBC AUTO-ENTMCNC: 34.3 G/DL (ref 32–36)
MCV RBC AUTO: 84 FL (ref 82–98)
MONOCYTES # BLD AUTO: ABNORMAL K/UL (ref 0.3–1)
MONOCYTES NFR BLD: 19 % (ref 4–15)
NEUTROPHILS # BLD AUTO: ABNORMAL K/UL (ref 1.8–7.7)
NEUTROPHILS NFR BLD: 71.4 % (ref 38–73)
NEUTS BAND NFR BLD MANUAL: 4.8 %
NRBC BLD-RTO: 0 /100 WBC
OVALOCYTES BLD QL SMEAR: ABNORMAL
PHOSPHATE SERPL-MCNC: 2.4 MG/DL (ref 2.7–4.5)
PLATELET # BLD AUTO: 6 K/UL (ref 150–450)
PLATELET BLD QL SMEAR: ABNORMAL
PMV BLD AUTO: 9.4 FL (ref 9.2–12.9)
POIKILOCYTOSIS BLD QL SMEAR: SLIGHT
POLYCHROMASIA BLD QL SMEAR: ABNORMAL
POTASSIUM SERPL-SCNC: 4.6 MMOL/L (ref 3.5–5.1)
PROT SERPL-MCNC: 5.5 G/DL (ref 6–8.4)
RBC # BLD AUTO: 2.53 M/UL (ref 4.6–6.2)
SODIUM SERPL-SCNC: 139 MMOL/L (ref 136–145)
SPECIMEN OUTDATE: NORMAL
UNIT NUMBER: NORMAL
WBC # BLD AUTO: 0.35 K/UL (ref 3.9–12.7)

## 2024-10-08 PROCEDURE — 86901 BLOOD TYPING SEROLOGIC RH(D): CPT | Performed by: INTERNAL MEDICINE

## 2024-10-08 PROCEDURE — P9037 PLATE PHERES LEUKOREDU IRRAD: HCPCS | Performed by: FAMILY MEDICINE

## 2024-10-08 PROCEDURE — 25000003 PHARM REV CODE 250: Performed by: INTERNAL MEDICINE

## 2024-10-08 PROCEDURE — 97116 GAIT TRAINING THERAPY: CPT | Mod: CQ

## 2024-10-08 PROCEDURE — 25000003 PHARM REV CODE 250: Performed by: NURSE PRACTITIONER

## 2024-10-08 PROCEDURE — 85007 BL SMEAR W/DIFF WBC COUNT: CPT | Performed by: NURSE PRACTITIONER

## 2024-10-08 PROCEDURE — 63600175 PHARM REV CODE 636 W HCPCS: Performed by: NURSE PRACTITIONER

## 2024-10-08 PROCEDURE — 63600175 PHARM REV CODE 636 W HCPCS: Performed by: INTERNAL MEDICINE

## 2024-10-08 PROCEDURE — 20600001 HC STEP DOWN PRIVATE ROOM

## 2024-10-08 PROCEDURE — 94799 UNLISTED PULMONARY SVC/PX: CPT

## 2024-10-08 PROCEDURE — 80053 COMPREHEN METABOLIC PANEL: CPT | Performed by: NURSE PRACTITIONER

## 2024-10-08 PROCEDURE — 83735 ASSAY OF MAGNESIUM: CPT | Performed by: NURSE PRACTITIONER

## 2024-10-08 PROCEDURE — 94761 N-INVAS EAR/PLS OXIMETRY MLT: CPT

## 2024-10-08 PROCEDURE — 86900 BLOOD TYPING SEROLOGIC ABO: CPT | Performed by: INTERNAL MEDICINE

## 2024-10-08 PROCEDURE — 86850 RBC ANTIBODY SCREEN: CPT | Performed by: INTERNAL MEDICINE

## 2024-10-08 PROCEDURE — 97110 THERAPEUTIC EXERCISES: CPT | Mod: CQ

## 2024-10-08 PROCEDURE — 85027 COMPLETE CBC AUTOMATED: CPT | Performed by: NURSE PRACTITIONER

## 2024-10-08 PROCEDURE — 84100 ASSAY OF PHOSPHORUS: CPT | Performed by: NURSE PRACTITIONER

## 2024-10-08 RX ORDER — OLANZAPINE 2.5 MG/1
5 TABLET ORAL NIGHTLY
Status: DISCONTINUED | OUTPATIENT
Start: 2024-10-08 | End: 2024-10-16 | Stop reason: HOSPADM

## 2024-10-08 RX ORDER — HYDROCODONE BITARTRATE AND ACETAMINOPHEN 500; 5 MG/1; MG/1
TABLET ORAL
Status: DISCONTINUED | OUTPATIENT
Start: 2024-10-08 | End: 2024-10-09 | Stop reason: SDUPTHER

## 2024-10-08 RX ORDER — MAGNESIUM SULFATE HEPTAHYDRATE 40 MG/ML
4 INJECTION, SOLUTION INTRAVENOUS ONCE
Status: COMPLETED | OUTPATIENT
Start: 2024-10-08 | End: 2024-10-08

## 2024-10-08 RX ORDER — TRAZODONE HYDROCHLORIDE 50 MG/1
50 TABLET ORAL NIGHTLY
Status: DISCONTINUED | OUTPATIENT
Start: 2024-10-08 | End: 2024-10-16 | Stop reason: HOSPADM

## 2024-10-08 RX ORDER — LANOLIN ALCOHOL/MO/W.PET/CERES
800 CREAM (GRAM) TOPICAL 2 TIMES DAILY
Status: DISCONTINUED | OUTPATIENT
Start: 2024-10-08 | End: 2024-10-10

## 2024-10-08 RX ORDER — PROCHLORPERAZINE EDISYLATE 5 MG/ML
5 INJECTION INTRAMUSCULAR; INTRAVENOUS EVERY 6 HOURS PRN
Status: DISCONTINUED | OUTPATIENT
Start: 2024-10-08 | End: 2024-10-08

## 2024-10-08 RX ORDER — PROCHLORPERAZINE EDISYLATE 5 MG/ML
5 INJECTION INTRAMUSCULAR; INTRAVENOUS EVERY 6 HOURS
Status: DISCONTINUED | OUTPATIENT
Start: 2024-10-08 | End: 2024-10-16 | Stop reason: HOSPADM

## 2024-10-08 RX ADMIN — VANCOMYCIN HYDROCHLORIDE 125 MG: KIT at 09:10

## 2024-10-08 RX ADMIN — Medication 1 DOSE: at 09:10

## 2024-10-08 RX ADMIN — MAGNESIUM SULFATE HEPTAHYDRATE 2 G: 40 INJECTION, SOLUTION INTRAVENOUS at 09:10

## 2024-10-08 RX ADMIN — Medication: at 09:10

## 2024-10-08 RX ADMIN — ONDANSETRON 8 MG: 2 INJECTION INTRAMUSCULAR; INTRAVENOUS at 09:10

## 2024-10-08 RX ADMIN — PROCHLORPERAZINE EDISYLATE 5 MG: 5 INJECTION INTRAMUSCULAR; INTRAVENOUS at 06:10

## 2024-10-08 RX ADMIN — CHOLESTYRAMINE 4 G: 4 POWDER, FOR SUSPENSION ORAL at 09:10

## 2024-10-08 RX ADMIN — Medication 1 DOSE: at 05:10

## 2024-10-08 RX ADMIN — OLANZAPINE 5 MG: 2.5 TABLET, FILM COATED ORAL at 09:10

## 2024-10-08 RX ADMIN — FILGRASTIM 300 MCG: 300 INJECTION, SOLUTION INTRAVENOUS; SUBCUTANEOUS at 09:10

## 2024-10-08 RX ADMIN — Medication 1 TABLET: at 07:10

## 2024-10-08 RX ADMIN — TACROLIMUS 0.5 MG: 0.5 CAPSULE ORAL at 09:10

## 2024-10-08 RX ADMIN — LETERMOVIR 480 MG: 480 TABLET, FILM COATED ORAL at 09:10

## 2024-10-08 RX ADMIN — ACYCLOVIR 800 MG: 200 CAPSULE ORAL at 09:10

## 2024-10-08 RX ADMIN — URSODIOL 300 MG: 300 CAPSULE ORAL at 09:10

## 2024-10-08 RX ADMIN — POSACONAZOLE 300 MG: 100 TABLET, DELAYED RELEASE ORAL at 09:10

## 2024-10-08 RX ADMIN — LIDOCAINE 5% 1 PATCH: 700 PATCH TOPICAL at 09:10

## 2024-10-08 RX ADMIN — CARVEDILOL 6.25 MG: 6.25 TABLET, FILM COATED ORAL at 09:10

## 2024-10-08 RX ADMIN — LEVOFLOXACIN 500 MG: 500 TABLET, FILM COATED ORAL at 09:10

## 2024-10-08 RX ADMIN — TAMSULOSIN HYDROCHLORIDE 0.4 MG: 0.4 CAPSULE ORAL at 09:10

## 2024-10-08 RX ADMIN — ONDANSETRON 8 MG: 2 INJECTION INTRAMUSCULAR; INTRAVENOUS at 03:10

## 2024-10-08 RX ADMIN — TACROLIMUS 0.5 MG: 0.5 CAPSULE ORAL at 05:10

## 2024-10-08 RX ADMIN — PROCHLORPERAZINE EDISYLATE 5 MG: 5 INJECTION INTRAMUSCULAR; INTRAVENOUS at 12:10

## 2024-10-08 RX ADMIN — HYDROCORTISONE: 25 CREAM TOPICAL at 09:10

## 2024-10-08 RX ADMIN — Medication 1 TABLET: at 01:10

## 2024-10-08 RX ADMIN — Medication 6 MG: at 09:10

## 2024-10-08 RX ADMIN — MYCOPHENOLATE MOFETIL 1000 MG: 250 CAPSULE ORAL at 09:10

## 2024-10-08 RX ADMIN — TRAZODONE HYDROCHLORIDE 50 MG: 50 TABLET ORAL at 09:10

## 2024-10-08 RX ADMIN — GABAPENTIN 600 MG: 300 CAPSULE ORAL at 09:10

## 2024-10-08 RX ADMIN — PANTOPRAZOLE SODIUM 40 MG: 40 TABLET, DELAYED RELEASE ORAL at 09:10

## 2024-10-08 RX ADMIN — ONDANSETRON 8 MG: 2 INJECTION INTRAMUSCULAR; INTRAVENOUS at 06:10

## 2024-10-08 RX ADMIN — PROCHLORPERAZINE EDISYLATE 5 MG: 5 INJECTION INTRAMUSCULAR; INTRAVENOUS at 05:10

## 2024-10-08 NOTE — ASSESSMENT & PLAN NOTE
- PT/OT following while inpatient  - recommending HH with rolling walker  - The mobility limitation cannot be sufficiently resolved by the use of a cane. Patient's functional mobility deficit can be sufficiently resolved with the use of a Rolling Walker. Patient's mobility limitation significantly impairs their ability to participate in one of more activities of daily living.  The use of a Rolling Walker, Walker will significantly improve the patient's ability to participate in MRADLS and the patient will use it on regular basis in the home.

## 2024-10-08 NOTE — PLAN OF CARE
Patient is Ecuadorean speaking, wife is . Day +19 haplo BMT. Patient AAOX4, VSS, afebrile, on room air. Patient complained of pain overnight, treated with prn meds. Patient up to the bathroom with walker and 1x assist (wife assisted patient with ADLs), free from falls and injuries. I&O's monitored as ordered. Bed in lowest position, side rails up x2, non-skid socks on, call light in reach. Patient educated to use call light for any needs, patient verbalizes understanding. There are no concerns at this time. Plan of care on going.

## 2024-10-08 NOTE — PLAN OF CARE
Pt alert and oriented x4. No c/o pain, n/v, or headache. Afebrile throughout shift. PT/Ot worked with pt. Pt ambulated throughout unit with assistance of a walker. Had difficulty swallowing am PO medication. Bed remains in lowest locked position with call light within reach. Plan of care reviewed.

## 2024-10-08 NOTE — ASSESSMENT & PLAN NOTE
- scheduled zofran IV 8mg q8h on 9/27; added scheduled compazine 9/30  - will start to wean with impending engraftment; stopped scheduled compazine on 10/7  - has prn ativan

## 2024-10-08 NOTE — ASSESSMENT & PLAN NOTE
- See electrolyte disorder  - Tacro likely contributing  - will need scheduled magnesium on discharge (currently on 800mg BID)  - giving extra IV 4g today

## 2024-10-08 NOTE — PROGRESS NOTES
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 Continuing tacro at 0.5mg BID.  today. Still having nausea, remains on IV zofran and compazine, will add zyprexa at night. Still with diarrhea although much improved. Continues with bilateral shoulder pain. Weight down with reduced continuous IVF rate. Replacing mag, increased scheduled dose to 800mg BID. PT/OT recommending HH. Will receive 1unit platelets. Afebrile, VSS    Objective:     Vital Signs (Most Recent):  Temp: 98.1 °F (36.7 °C) (10/08/24 0732)  Pulse: 93 (10/08/24 0732)  Resp: 16 (10/08/24 0732)  BP: (!) 144/73 (10/08/24 0732)  SpO2: 96 % (10/08/24 0732) Vital Signs (24h Range):  Temp:  [98.1 °F (36.7 °C)-98.8 °F (37.1 °C)] 98.1 °F (36.7 °C)  Pulse:  [] 93  Resp:  [16-20] 16  SpO2:  [91 %-99 %] 96 %  BP: (144-161)/(72-82) 144/73     Weight: 76 kg (167 lb 7 oz)  Body mass index is 24.73 kg/m².  Body surface area is 1.92 meters squared.      Intake/Output - Last 3 Shifts         10/06 0700  10/07 0659 10/07 0700  10/08 0659 10/08 0700  10/09 0659    P.O. 400 400     I.V. (mL/kg) 2188.8 (28.5)      Total Intake(mL/kg) 2588.8 (33.8) 400 (5.3)     Urine (mL/kg/hr) 1350 (0.7)      Stool 0      Total Output 1350      Net +1238.8 +400            Urine Occurrence  752 x     Stool Occurrence 0 x 1 x              Physical Exam  Vitals and nursing note reviewed.   Constitutional:       Appearance: Normal appearance. He is well-developed.   HENT:      Head: Normocephalic and atraumatic.      Nose: Nose normal.      Mouth/Throat:      Pharynx: No oropharyngeal exudate or posterior oropharyngeal erythema.   Eyes:      General:         Right eye: No discharge.         Left eye: No discharge.       Extraocular Movements: Extraocular movements intact.      Conjunctiva/sclera: Conjunctivae normal.   Cardiovascular:      Rate and Rhythm: Normal rate and regular rhythm.      Heart sounds: Normal heart sounds. No murmur heard.  Pulmonary:      Effort: Pulmonary effort is normal. No respiratory distress.      Breath sounds: Normal breath sounds. No wheezing or rales.   Abdominal:      General: Bowel sounds are normal. There is no distension.      Palpations: Abdomen is soft.      Tenderness: There is no abdominal tenderness.   Musculoskeletal:         General: No deformity. Normal range of motion.      Cervical back: Normal range of motion and neck supple.      Right lower leg: No edema.      Left lower leg: No edema.   Skin:     General: Skin is warm and dry.      Findings: Bruising present. No erythema or rash.      Comments: Right chest wall vas cath. Dressing c/d/i. No sign of infection to site.   Neurological:      General: No focal deficit present.      Mental Status: He is alert and oriented to person, place, and time.      Motor: No weakness.   Psychiatric:         Mood and Affect: Mood normal.         Behavior: Behavior normal.         Thought Content: Thought content normal.         Judgment: Judgment normal.            Significant Labs:   CBC:   Recent Labs   Lab 10/07/24  0436 10/08/24  0404   WBC 0.17* 0.35*   HGB 7.1* 7.3*   HCT 19.8* 21.3*   PLT 12* 6*    and CMP:   Recent Labs   Lab 10/07/24  0436 10/08/24  0404    139   K 4.9 4.6   * 108   CO2 23 24   * 116*   BUN 9 8   CREATININE 0.6 0.7   CALCIUM 8.0* 8.7   PROT 4.7* 5.5*   ALBUMIN 2.4* 2.8*   BILITOT 0.5 0.7   ALKPHOS 48* 56   AST 11 15   ALT 9* 11   ANIONGAP 4* 7*       Diagnostic Results:  I have reviewed all pertinent imaging results/findings within the past 24 hours.  Assessment/Plan:     * S/P allogeneic bone marrow transplant  - Patient of Dr. Paco Hickey with MDS  - Admitted 9/13/24 for a  TBI PT Cy haplo (son)  allogeneic SCT. Today is Day +19  - Tacro level 8.8 on 10/7; continues on 0.5 mg BID. Next level 10/9  -Continue daily acute GVHD charting while inpatient. None noted thus far.  - Patient is B+. Donor is A+.  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 Patient will start letermovir ppx on 9/24/24.  - Received fresh BMT product on 9/19/24 with a CD34 dose of 3.55 x10^6.  - Of note, cilostazol may interact with tacro. (Currently on hold for plts < 50K).  - See treatment plan below:    Planned conditioning regimen:  Fludarabine on Day -5, -4, -3, and -2  Melphalan on Day -2  TBI on Day -1     Planned  GVHD Prophylaxis:  Cyclophosphamide (with [place]na) on Days +3 and +4  Mycophenolate starting on Day +5  Tacrolimus starting on Day +5     Antimicrobial Prophylaxis:  Acyclovir starting on Day -5  Levofloxacin starting on Day -1  Micafungin on Day -1 through Day +4  Letermovir starting on Day +5 (if CMV PCR drawn on day 0 results negative)  Posaconazole starting on Day +5  Bactrim starting on Day +30     SOS/VOD Prophylaxis:  Ursodiol on Day -5 through Day +30      Growth Factor Support:  Neupogen starting on Day +5     Caregiver: wife   Post-transplant discharge plans: home        Myelodysplastic syndrome  From Dr Hickey's clinic note 8/5:  Stem cell transplant candidate: We discussed the role for allogeneic stem cell transplantation in this disease process as a potentially curative option. We had an extensive discussion about the rationale, logistics, risks, and benefits. We reviewed the requirement to stay in the [place] area for 100 days with a caregiver at all times. We discussed the risks, including infection, graft failure, organ toxicity, graft versus host disease, relapse of disease, and secondary cancers. We reviewed the need for long-term immunosuppression and need for close monitoring. HCT-CI of 1 (intermediate risk). We had a prolonged discussion today regarding his recent deconditioning, Cdiff, and  underlying disease. He is now much improved since last seen, and is improving his strength since starting to work with PT. Diarrhea now controlled. We discussed that our plan to move forward with the transplant process.   Coordinator: Fay Stephens  Regimen:  + 2Gy TBI  Donor: son (haplo)   Graft source: BM  - Admitted 9/13/24 for a  TBI PT Cy haplo (son) allogeneic BMT    Pancytopenia due to antineoplastic chemotherapy  - Daily CBC while inpatient  - Transfuse for hgb <7 Plt  or <10K  - Continue antimicrobial ppx  - Holding cilostazol for plts < 50K   today    Chemotherapy induced diarrhea  - C-diff neg 9/24  - Of note, was treated empirically for c-diff with oral vanc prior to admission and is receiving oral vanc ppx while admitted.  - Receiving scheduled imodium and PRN lomotil  - Started Questran 10/1.  - Diarrhea improved, so changied imodium from scheduled to PRN    Hemorrhoids  - d/t chemo induced diarrhea  - has anusol, tucks pads, and LETS gel  - patient denies any bleeding at site  - has PRN morphine for pain control  - hemorrhoid pain improving with diarrhea control    Neutropenic fever  - First fever with tmax 101.8F on 9/23  - Infection w/u unremarkable thus far  - No fevers since 9/23, Cefepime stopped on 9/25 and back on oral ppx LVQ.  RESOLVED    Hypomagnesemia  - See electrolyte disorder  - Tacro likely contributing  - will need scheduled magnesium on discharge (currently on 800mg BID)  - giving extra IV 4g today    Electrolyte disorder  - Replete per PRN electrolyte order set  - Daily CMP, mag, and phos while inpatient    Moderate malnutrition  Nutrition consulted. Most recent weight and BMI monitored-     Measurements:  Wt Readings from Last 1 Encounters:   10/08/24 76 kg (167 lb 7 oz)   Body mass index is 24.73 kg/m².    Patient has been screened and assessed by RD.    - Switched boost plus to boost breeze as diary of plus making patient nauseous  - continue IVF; rate increased  to 100ml/hr  - PO intake as tolerated; on scheduled antiemetics for nausea    Dry mouth  - continue to moisten lips with chapstick  - encouraged use of oral hygiene rinses to sterilize mouth and stimulate saliva production    Urinary frequency  - reports increased nightly frequency and urgency with low output  - no formal BPH diagnosis; started flomax 9/27  - improved    Chemotherapy-induced nausea  - scheduled zofran IV 8mg q8h on 9/27; added scheduled compazine 9/30  - still persists; will add 5mg zyprexa at night  - has prn ativan    Headache  - C/o headache 9/25  - Daily coffee drinker. Improved after drinking coffee.  - Suspect caffeine withdrawal.  - Can start caffeine tablet if patient is unable to drink coffee due to chemo side effects  - none today    Insomnia  - has PRN Trazodone however not currently using  - started on scheduled melatonin    History of Clostridioides difficile infection  - Was treated empirically for c-diff with oral vanc prior to admission.  - Continue oral vanc ppx per transplant protocol      Neuropathy  - Continue home gabapentin    Hypertension, essential  - Was holding home Coreg for fluid responsive hypotension. Resumed 9/30.    Coronary artery disease involving native coronary artery of native heart without angina pectoris  - Was holding home Coreg for hypotension. Resumed 9/30.    Physical debility  - PT/OT following while inpatient  - recommending HH with rolling walker  - The mobility limitation cannot be sufficiently resolved by the use of a cane. Patient's functional mobility deficit can be sufficiently resolved with the use of a Rolling Walker. Patient's mobility limitation significantly impairs their ability to participate in one of more activities of daily living.  The use of a Rolling Walker, Walker will significantly improve the patient's ability to participate in MRADLS and the patient will use it on regular basis in the home.        VTE Risk Mitigation (From  admission, onward)           Ordered     heparin, porcine (PF) 100 unit/mL injection flush 300 Units  As needed (PRN)         09/13/24 9819                    Disposition: Remains inpatient    Judy Anthony NP  Bone Marrow Transplant  [facility] - Oncology ([facility])

## 2024-10-08 NOTE — ASSESSMENT & PLAN NOTE
- scheduled zofran IV 8mg q8h on 9/27  - added scheduled compazine 9/30; changed back to PRN on 10/9 with engraftment and impending DC  - added 5mg zyprexa at night 10/8  - has prn ativan

## 2024-10-08 NOTE — PT/OT/SLP PROGRESS
Physical Therapy Treatment    Patient Name:  Guillaume Salinas   MRN:  [id number]    Recommendations:     Discharge Recommendations: No Therapy  Discharge Equipment Recommendations: No equipment  Barriers to discharge: None    Assessment:     Guillaume Salinas is a 69 y.o. male admitted with a medical diagnosis of S/P allogeneic bone marrow transplant.  He presents with the following impairments/functional limitations: weakness, impaired endurance, gait instability, edema, impaired cardiopulmonary response to activity . Patient showed improved mobility and was able to ambulate on hallway, with no loss of balance or signs of distress.    Rehab Prognosis: Good; patient would benefit from acute skilled PT services to address these deficits and reach maximum level of function.    Recent Surgery: * No surgery found *      Plan:     During this hospitalization, patient to be seen 2 x/week to address the identified rehab impairments via gait training, therapeutic activities, therapeutic exercises, neuromuscular re-education and progress toward the following goals:    Plan of Care Expires:  10/15/24    Subjective     Chief Complaint: L shoulder is sore  Patient/Family Comments/goals: to heal well so he can go back home.  Pain/Comfort:  Pain Rating 1: 1/10  Location - Side 1: Left  Location - Orientation 1: generalized  Location 1: shoulder  Pain Addressed 1: Reposition, Distraction  Pain Rating Post-Intervention 1: 1/10      Objective:     Communicated with NSG prior to session.  Patient found HOB elevated with central line, peripheral IV upon PT entry to room.     General Precautions: Standard, fall  Orthopedic Precautions: N/A  Braces: N/A  Respiratory Status: Nasal cannula, flow 2 L/min     Functional Mobility:  Bed Mobility:     Rolling Right: modified independence  Scooting: modified independence  Supine to Sit: modified independence  Sit to Supine: modified independence  Transfers:     Sit to Stand:   supervision with rolling walker  Bed to Chair: stand by assistance with  rolling walker  using  Stand Pivot  Gait: 12 ft x 2 to the bathroom and then ~160 ft on hallway with RW and CGA to SBA, with IV pole in tow and cues to correct posture.      AM-PAC 6 CLICK MOBILITY  Turning over in bed (including adjusting bedclothes, sheets and blankets)?: 4  Sitting down on and standing up from a chair with arms (e.g., wheelchair, bedside commode, etc.): 4  Moving from lying on back to sitting on the side of the bed?: 4  Moving to and from a bed to a chair (including a wheelchair)?: 3  Need to walk in hospital room?: 3  Climbing 3-5 steps with a railing?: 3  Basic Mobility Total Score: 21       Treatment & Education:  Donned a second gown. Patient assisted to the bathroom. There ex in sitting: LAQ, HIP FLEX AND HEEL/TOE RAISES 2X12 REPS B LE.Educated Patient and spouse on safety and fall prevention when ambulating with RW.    Patient left up in chair with all lines intact, call button in reach, and spouse present.    GOALS:   Multidisciplinary Problems       Physical Therapy Goals          Problem: Physical Therapy    Goal Priority Disciplines Outcome Interventions   Physical Therapy Goal     PT, PT/OT Progressing    Description: Goals to be met by: 10/15/24     Patient will increase functional independence with mobility by performin. Supine to sit with [place]  2. Sit to supine with [place]  3. Sit to stand transfer with [place]  4. Bed to chair transfer with [place] using No Assistive Device  5. Gait  x 500 feet with [place] using No Assistive Device.   6. Lower extremity exercise program x15 reps per handout, with assistance as needed                         Time Tracking:     PT Received On: 10/08/24  PT Start Time: 1036     PT Stop Time: 1100  PT Total Time (min): 24 min     Billable Minutes: Gait Training 15 and Therapeutic Exercise 9    Treatment Type: Treatment  PT/PTA: PTA     Number  of PTA visits since last PT visit: 1     10/08/2024

## 2024-10-08 NOTE — ASSESSMENT & PLAN NOTE
- Patient of Dr. 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 Today is Day +19  - Tacro level 8.8 on 10/7; continues on 0.5 mg BID. Next level 10/9  -Continue daily acute GVHD charting while inpatient. None noted thus far.  - Patient is B+. Donor is A+.  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 Patient will start letermovir ppx on 9/24/24.  - Received fresh BMT product on 9/19/24 with a CD34 dose of 3.55 x10^6.  - Of note, cilostazol may interact with tacro. (Currently on hold for plts < 50K).  - See treatment plan below:    Planned conditioning regimen:  Fludarabine on Day -5, -4, -3, and -2  Melphalan on Day -2  TBI on Day -1     Planned  GVHD Prophylaxis:  Cyclophosphamide (with [place]) on Days +3 and +4  Mycophenolate starting on Day +5  Tacrolimus starting on Day +5     Antimicrobial Prophylaxis:  Acyclovir starting on Day -5  Levofloxacin starting on Day -1  Micafungin on Day -1 through Day +4  Letermovir starting on Day +5 (if CMV PCR drawn on day 0 results negative)  Posaconazole starting on Day +5  Bactrim starting on Day +30     SOS/VOD Prophylaxis:  Ursodiol on Day -5 through Day +30      Growth Factor Support:  Neupogen starting on Day +5     Caregiver: wife   Post-transplant discharge plans: home

## 2024-10-08 NOTE — SUBJECTIVE & OBJECTIVE
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 Continuing tacro at 0.5mg BID.  today. Denies nausea, still on IV zofran and compazine. Will start weaning. Still with diarrhea although much improved. Continues with bilateral shoulder pain. Weight down with reduced continuous IVF rate. Replacing mag, increased scheduled dose to 800mg BID. PT/OT recommending HH. Will receive 1unit platelets. Afebrile, VSS    Objective:     Vital Signs (Most Recent):  Temp: 98.1 °F (36.7 °C) (10/08/24 0732)  Pulse: 93 (10/08/24 0732)  Resp: 16 (10/08/24 0732)  BP: (!) 144/73 (10/08/24 0732)  SpO2: 96 % (10/08/24 0732) Vital Signs (24h Range):  Temp:  [98.1 °F (36.7 °C)-98.8 °F (37.1 °C)] 98.1 °F (36.7 °C)  Pulse:  [] 93  Resp:  [16-20] 16  SpO2:  [91 %-99 %] 96 %  BP: (144-161)/(72-82) 144/73     Weight: 76 kg (167 lb 7 oz)  Body mass index is 24.73 kg/m².  Body surface area is 1.92 meters squared.      Intake/Output - Last 3 Shifts         10/06 0700  10/07 0659 10/07 0700  10/08 0659 10/08 0700  10/09 0659    P.O. 400 400     I.V. (mL/kg) 2188.8 (28.5)      Total Intake(mL/kg) 2588.8 (33.8) 400 (5.3)     Urine (mL/kg/hr) 1350 (0.7)      Stool 0      Total Output 1350      Net +1238.8 +400            Urine Occurrence  752 x     Stool Occurrence 0 x 1 x              Physical Exam  Vitals and nursing note reviewed.   Constitutional:       Appearance: Normal appearance. He is well-developed.   HENT:      Head: Normocephalic and atraumatic.      Nose: Nose normal.      Mouth/Throat:      Pharynx: No oropharyngeal exudate or posterior oropharyngeal erythema.   Eyes:      General:         Right eye: No discharge.         Left eye: No discharge.      Extraocular Movements: Extraocular movements intact.      Conjunctiva/sclera: Conjunctivae normal.   Cardiovascular:      Rate and Rhythm: Normal rate and regular rhythm.      Heart sounds: Normal heart sounds. No murmur  heard.  Pulmonary:      Effort: Pulmonary effort is normal. No respiratory distress.      Breath sounds: Normal breath sounds. No wheezing or rales.   Abdominal:      General: Bowel sounds are normal. There is no distension.      Palpations: Abdomen is soft.      Tenderness: There is no abdominal tenderness.   Musculoskeletal:         General: No deformity. Normal range of motion.      Cervical back: Normal range of motion and neck supple.      Right lower leg: No edema.      Left lower leg: No edema.   Skin:     General: Skin is warm and dry.      Findings: Bruising present. No erythema or rash.      Comments: Right chest wall vas cath. Dressing c/d/i. No sign of infection to site.   Neurological:      General: No focal deficit present.      Mental Status: He is alert and oriented to person, place, and time.      Motor: No weakness.   Psychiatric:         Mood and Affect: Mood normal.         Behavior: Behavior normal.         Thought Content: Thought content normal.         Judgment: Judgment normal.            Significant Labs:   CBC:   Recent Labs   Lab 10/07/24  0436 10/08/24  0404   WBC 0.17* 0.35*   HGB 7.1* 7.3*   HCT 19.8* 21.3*   PLT 12* 6*    and CMP:   Recent Labs   Lab 10/07/24  0436 10/08/24  0404    139   K 4.9 4.6   * 108   CO2 23 24   * 116*   BUN 9 8   CREATININE 0.6 0.7   CALCIUM 8.0* 8.7   PROT 4.7* 5.5*   ALBUMIN 2.4* 2.8*   BILITOT 0.5 0.7   ALKPHOS 48* 56   AST 11 15   ALT 9* 11   ANIONGAP 4* 7*       Diagnostic Results:  I have reviewed all pertinent imaging results/findings within the past 24 hours.

## 2024-10-08 NOTE — ASSESSMENT & PLAN NOTE
From Dr Hickey's clinic note 8/5:  Stem cell transplant candidate: We discussed the role for allogeneic stem cell transplantation in this disease process as a potentially curative option. We had an extensive discussion about the rationale, logistics, risks, and benefits. We reviewed the requirement to stay in the [place] area for 100 days with a caregiver at all times. We discussed the risks, including infection, graft failure, organ toxicity, graft versus host disease, relapse of disease, and secondary cancers. We reviewed the need for long-term immunosuppression and need for close monitoring. HCT-CI of 1 (intermediate risk). We had a prolonged discussion today regarding his recent deconditioning, Cdiff, and underlying disease. He is now much improved since last seen, and is improving his strength since starting to work with PT. Diarrhea now controlled. We discussed that our plan to move forward with the transplant process.   Coordinator: Fay Stephens  Regimen:  + 2Gy TBI  Donor: son (haplo)   Graft source: BM  - Admitted 9/13/24 for a  TBI PT Cy haplo (son) allogeneic BMT

## 2024-10-08 NOTE — ASSESSMENT & PLAN NOTE
Nutrition consulted. Most recent weight and BMI monitored-     Measurements:  Wt Readings from Last 1 Encounters:   10/08/24 76 kg (167 lb 7 oz)   Body mass index is 24.73 kg/m².    Patient has been screened and assessed by RD.    - Switched boost plus to boost breeze as diary of plus making patient nauseous  - continue IVF; rate increased to 100ml/hr  - PO intake as tolerated; on scheduled antiemetics for nausea

## 2024-10-09 LAB
ALBUMIN SERPL BCP-MCNC: 2.6 G/DL (ref 3.5–5.2)
ALP SERPL-CCNC: 49 U/L (ref 55–135)
ALT SERPL W/O P-5'-P-CCNC: 13 U/L (ref 10–44)
ANION GAP SERPL CALC-SCNC: 5 MMOL/L (ref 8–16)
ANISOCYTOSIS BLD QL SMEAR: SLIGHT
AST SERPL-CCNC: 16 U/L (ref 10–40)
BASOPHILS # BLD AUTO: ABNORMAL K/UL (ref 0–0.2)
BASOPHILS NFR BLD: 0 % (ref 0–1.9)
BILIRUB SERPL-MCNC: 0.6 MG/DL (ref 0.1–1)
BLD PROD TYP BPU: NORMAL
BLOOD UNIT EXPIRATION DATE: NORMAL
BLOOD UNIT TYPE CODE: 7300
BLOOD UNIT TYPE: NORMAL
BUN SERPL-MCNC: 7 MG/DL (ref 8–23)
CALCIUM SERPL-MCNC: 7.9 MG/DL (ref 8.7–10.5)
CHLORIDE SERPL-SCNC: 108 MMOL/L (ref 95–110)
CO2 SERPL-SCNC: 25 MMOL/L (ref 23–29)
CODING SYSTEM: NORMAL
CREAT SERPL-MCNC: 0.6 MG/DL (ref 0.5–1.4)
CROSSMATCH INTERPRETATION: NORMAL
DIFFERENTIAL METHOD BLD: ABNORMAL
DISPENSE STATUS: NORMAL
EOSINOPHIL # BLD AUTO: ABNORMAL K/UL (ref 0–0.5)
EOSINOPHIL NFR BLD: 0 % (ref 0–8)
ERYTHROCYTE [DISTWIDTH] IN BLOOD BY AUTOMATED COUNT: 12.8 % (ref 11.5–14.5)
EST. GFR  (NO RACE VARIABLE): >60 ML/MIN/1.73 M^2
GLUCOSE SERPL-MCNC: 108 MG/DL (ref 70–110)
HCT VFR BLD AUTO: 18.6 % (ref 40–54)
HGB BLD-MCNC: 6.4 G/DL (ref 14–18)
HYPOCHROMIA BLD QL SMEAR: ABNORMAL
IMM GRANULOCYTES # BLD AUTO: ABNORMAL K/UL (ref 0–0.04)
IMM GRANULOCYTES NFR BLD AUTO: ABNORMAL % (ref 0–0.5)
LYMPHOCYTES # BLD AUTO: ABNORMAL K/UL (ref 1–4.8)
LYMPHOCYTES NFR BLD: 10 % (ref 18–48)
MAGNESIUM SERPL-MCNC: 1.6 MG/DL (ref 1.6–2.6)
MCH RBC QN AUTO: 29.1 PG (ref 27–31)
MCHC RBC AUTO-ENTMCNC: 34.4 G/DL (ref 32–36)
MCV RBC AUTO: 85 FL (ref 82–98)
MONOCYTES # BLD AUTO: ABNORMAL K/UL (ref 0.3–1)
MONOCYTES NFR BLD: 0 % (ref 4–15)
NEUTROPHILS NFR BLD: 90 % (ref 38–73)
NRBC BLD-RTO: 0 /100 WBC
NUM UNITS TRANS PACKED RBC: NORMAL
OVALOCYTES BLD QL SMEAR: ABNORMAL
PHOSPHATE SERPL-MCNC: 3 MG/DL (ref 2.7–4.5)
PLATELET # BLD AUTO: 18 K/UL (ref 150–450)
PMV BLD AUTO: 9.9 FL (ref 9.2–12.9)
POIKILOCYTOSIS BLD QL SMEAR: SLIGHT
POLYCHROMASIA BLD QL SMEAR: ABNORMAL
POTASSIUM SERPL-SCNC: 4.5 MMOL/L (ref 3.5–5.1)
PROT SERPL-MCNC: 4.9 G/DL (ref 6–8.4)
RBC # BLD AUTO: 2.2 M/UL (ref 4.6–6.2)
SODIUM SERPL-SCNC: 138 MMOL/L (ref 136–145)
SPHEROCYTES BLD QL SMEAR: ABNORMAL
TACROLIMUS BLD-MCNC: 6.4 NG/ML (ref 5–15)
WBC # BLD AUTO: 0.59 K/UL (ref 3.9–12.7)

## 2024-10-09 PROCEDURE — 20600001 HC STEP DOWN PRIVATE ROOM

## 2024-10-09 PROCEDURE — 25000003 PHARM REV CODE 250: Performed by: NURSE PRACTITIONER

## 2024-10-09 PROCEDURE — 85007 BL SMEAR W/DIFF WBC COUNT: CPT | Performed by: NURSE PRACTITIONER

## 2024-10-09 PROCEDURE — 25000003 PHARM REV CODE 250: Performed by: INTERNAL MEDICINE

## 2024-10-09 PROCEDURE — 63600175 PHARM REV CODE 636 W HCPCS: Performed by: NURSE PRACTITIONER

## 2024-10-09 PROCEDURE — 83735 ASSAY OF MAGNESIUM: CPT | Performed by: NURSE PRACTITIONER

## 2024-10-09 PROCEDURE — 86920 COMPATIBILITY TEST SPIN: CPT | Performed by: FAMILY MEDICINE

## 2024-10-09 PROCEDURE — 84100 ASSAY OF PHOSPHORUS: CPT | Performed by: NURSE PRACTITIONER

## 2024-10-09 PROCEDURE — 80197 ASSAY OF TACROLIMUS: CPT | Performed by: INTERNAL MEDICINE

## 2024-10-09 PROCEDURE — 80053 COMPREHEN METABOLIC PANEL: CPT | Performed by: NURSE PRACTITIONER

## 2024-10-09 PROCEDURE — P9040 RBC LEUKOREDUCED IRRADIATED: HCPCS | Performed by: FAMILY MEDICINE

## 2024-10-09 PROCEDURE — 97535 SELF CARE MNGMENT TRAINING: CPT | Mod: CO

## 2024-10-09 PROCEDURE — 63600175 PHARM REV CODE 636 W HCPCS: Mod: JZ,JG | Performed by: INTERNAL MEDICINE

## 2024-10-09 PROCEDURE — 94761 N-INVAS EAR/PLS OXIMETRY MLT: CPT

## 2024-10-09 PROCEDURE — 85027 COMPLETE CBC AUTOMATED: CPT | Performed by: NURSE PRACTITIONER

## 2024-10-09 RX ORDER — HYDROCODONE BITARTRATE AND ACETAMINOPHEN 500; 5 MG/1; MG/1
TABLET ORAL
Status: DISCONTINUED | OUTPATIENT
Start: 2024-10-09 | End: 2024-10-16 | Stop reason: HOSPADM

## 2024-10-09 RX ORDER — TACROLIMUS 1 MG/1
1 CAPSULE ORAL EVERY EVENING
Status: DISCONTINUED | OUTPATIENT
Start: 2024-10-09 | End: 2024-10-16 | Stop reason: HOSPADM

## 2024-10-09 RX ADMIN — VANCOMYCIN HYDROCHLORIDE 125 MG: KIT at 08:10

## 2024-10-09 RX ADMIN — MYCOPHENOLATE MOFETIL 1000 MG: 250 CAPSULE ORAL at 08:10

## 2024-10-09 RX ADMIN — TRAZODONE HYDROCHLORIDE 50 MG: 50 TABLET ORAL at 09:10

## 2024-10-09 RX ADMIN — TACROLIMUS 1 MG: 1 CAPSULE ORAL at 06:10

## 2024-10-09 RX ADMIN — Medication: at 09:10

## 2024-10-09 RX ADMIN — CARVEDILOL 6.25 MG: 6.25 TABLET, FILM COATED ORAL at 08:10

## 2024-10-09 RX ADMIN — CARVEDILOL 6.25 MG: 6.25 TABLET, FILM COATED ORAL at 09:10

## 2024-10-09 RX ADMIN — VANCOMYCIN HYDROCHLORIDE 125 MG: KIT at 09:10

## 2024-10-09 RX ADMIN — POSACONAZOLE 300 MG: 100 TABLET, DELAYED RELEASE ORAL at 08:10

## 2024-10-09 RX ADMIN — ACYCLOVIR 800 MG: 200 CAPSULE ORAL at 09:10

## 2024-10-09 RX ADMIN — Medication 1 DOSE: at 06:10

## 2024-10-09 RX ADMIN — GABAPENTIN 600 MG: 300 CAPSULE ORAL at 08:10

## 2024-10-09 RX ADMIN — LIDOCAINE 5% 1 PATCH: 700 PATCH TOPICAL at 08:10

## 2024-10-09 RX ADMIN — MYCOPHENOLATE MOFETIL 1000 MG: 250 CAPSULE ORAL at 03:10

## 2024-10-09 RX ADMIN — GABAPENTIN 600 MG: 300 CAPSULE ORAL at 09:10

## 2024-10-09 RX ADMIN — CHOLESTYRAMINE 4 G: 4 POWDER, FOR SUSPENSION ORAL at 08:10

## 2024-10-09 RX ADMIN — SODIUM CHLORIDE AND POTASSIUM CHLORIDE: .9; .15 SOLUTION INTRAVENOUS at 06:10

## 2024-10-09 RX ADMIN — ONDANSETRON 8 MG: 2 INJECTION INTRAMUSCULAR; INTRAVENOUS at 01:10

## 2024-10-09 RX ADMIN — FILGRASTIM 300 MCG: 300 INJECTION, SOLUTION INTRAVENOUS; SUBCUTANEOUS at 08:10

## 2024-10-09 RX ADMIN — PROCHLORPERAZINE EDISYLATE 5 MG: 5 INJECTION INTRAMUSCULAR; INTRAVENOUS at 01:10

## 2024-10-09 RX ADMIN — PROCHLORPERAZINE EDISYLATE 5 MG: 5 INJECTION INTRAMUSCULAR; INTRAVENOUS at 05:10

## 2024-10-09 RX ADMIN — OLANZAPINE 5 MG: 2.5 TABLET, FILM COATED ORAL at 09:10

## 2024-10-09 RX ADMIN — ONDANSETRON 8 MG: 2 INJECTION INTRAMUSCULAR; INTRAVENOUS at 09:10

## 2024-10-09 RX ADMIN — HYDROCORTISONE: 25 CREAM TOPICAL at 09:10

## 2024-10-09 RX ADMIN — LETERMOVIR 480 MG: 480 TABLET, FILM COATED ORAL at 08:10

## 2024-10-09 RX ADMIN — MYCOPHENOLATE MOFETIL 1000 MG: 250 CAPSULE ORAL at 09:10

## 2024-10-09 RX ADMIN — DIPHENHYDRAMINE HYDROCHLORIDE 25 MG: 25 CAPSULE ORAL at 05:10

## 2024-10-09 RX ADMIN — PANTOPRAZOLE SODIUM 40 MG: 40 TABLET, DELAYED RELEASE ORAL at 08:10

## 2024-10-09 RX ADMIN — Medication 1 DOSE: at 01:10

## 2024-10-09 RX ADMIN — Medication 1 DOSE: at 08:10

## 2024-10-09 RX ADMIN — HYDROCORTISONE: 25 CREAM TOPICAL at 08:10

## 2024-10-09 RX ADMIN — PROCHLORPERAZINE EDISYLATE 5 MG: 5 INJECTION INTRAMUSCULAR; INTRAVENOUS at 06:10

## 2024-10-09 RX ADMIN — LEVOFLOXACIN 500 MG: 500 TABLET, FILM COATED ORAL at 08:10

## 2024-10-09 RX ADMIN — Medication: at 08:10

## 2024-10-09 RX ADMIN — ACYCLOVIR 800 MG: 200 CAPSULE ORAL at 08:10

## 2024-10-09 RX ADMIN — TAMSULOSIN HYDROCHLORIDE 0.4 MG: 0.4 CAPSULE ORAL at 08:10

## 2024-10-09 RX ADMIN — Medication 800 MG: at 08:10

## 2024-10-09 RX ADMIN — PROCHLORPERAZINE EDISYLATE 5 MG: 5 INJECTION INTRAMUSCULAR; INTRAVENOUS at 11:10

## 2024-10-09 RX ADMIN — TACROLIMUS 0.5 MG: 0.5 CAPSULE ORAL at 08:10

## 2024-10-09 RX ADMIN — Medication 1 DOSE: at 09:10

## 2024-10-09 RX ADMIN — ONDANSETRON 8 MG: 2 INJECTION INTRAMUSCULAR; INTRAVENOUS at 05:10

## 2024-10-09 RX ADMIN — URSODIOL 300 MG: 300 CAPSULE ORAL at 08:10

## 2024-10-09 RX ADMIN — URSODIOL 300 MG: 300 CAPSULE ORAL at 09:10

## 2024-10-09 NOTE — PLAN OF CARE
Day +20 Haplo SCT; no acute events this shift. Afebrile & VSS on room air. No PRNs needed. Nausea controlled with scheduled medications. Electrolytes closely monitored. IVF discontinued as ordered, pt encouraged to drink lots of fluids. Ambulates with assistance to bathroom; educated on importance of I/O recording. CHG wipes provided and encouraged use. Tolerating diet, voiding without difficulty. Pt and wife at bedside educated on importance of bed alarm. Despite education, pt continues to refuse the bed alarm. Pt remaining free from falls or injury throughout shift; bed locked and in lowest position; call light within reach. POC reviewed with patient and family at bedside. All needs addressed. Frequent rounding performed.

## 2024-10-09 NOTE — ASSESSMENT & PLAN NOTE
Nutrition consulted. Most recent weight and BMI monitored-     Measurements:  Wt Readings from Last 1 Encounters:   10/08/24 76 kg (167 lb 7 oz)   Body mass index is 24.73 kg/m².    Patient has been screened and assessed by RD.    - Switched boost plus to boost breeze as diary of plus making patient nauseous  - PO intake as tolerated; on scheduled antiemetics for nausea  - stopped IVF with engraftment; encouraged increased PO intake in order to DC

## 2024-10-09 NOTE — ASSESSMENT & PLAN NOTE
- See electrolyte disorder  - replace PRN  - Tacro likely contributing  - will need scheduled magnesium on discharge (currently on 800mg BID)

## 2024-10-09 NOTE — SUBJECTIVE & OBJECTIVE
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 Day 1 of engraftment today with an ANC of 531. Tacro level 6.4. Increasing dose to 0.5mg AM and 1mg PM. Nausea better with zyprexa. Diarrhea controlled with prn imodium. Reports feels like some large pills get stuck when swallowing; encouraged patient to take slowly with plenty of water. Will stop IVF today. PT/OT recommending HH. Afebrile, VSS    Objective:     Vital Signs (Most Recent):  Temp: 98.8 °F (37.1 °C) (10/09/24 1047)  Pulse: 96 (10/09/24 1047)  Resp: 18 (10/09/24 1047)  BP: 136/80 (10/09/24 1047)  SpO2: (!) 90 % (10/09/24 1047) Vital Signs (24h Range):  Temp:  [97.8 °F (36.6 °C)-99.5 °F (37.5 °C)] 98.8 °F (37.1 °C)  Pulse:  [] 96  Resp:  [14-20] 18  SpO2:  [90 %-98 %] 90 %  BP: (130-157)/(65-81) 136/80     Weight: 76 kg (167 lb 7 oz)  Body mass index is 24.73 kg/m².  Body surface area is 1.92 meters squared.      Intake/Output - Last 3 Shifts         10/07 0700  10/08 0659 10/08 0700  10/09 0659 10/09 0700  10/10 0659    P.O. 400 400     I.V. (mL/kg)  2412 (31.8)     Blood   230    Total Intake(mL/kg) 400 (5.3) 2812 (37) 230 (3)    Urine (mL/kg/hr)  900 (0.5) 1100 (2.6)    Stool       Total Output  900 1100    Net +400 +1912 -870           Urine Occurrence 752 x      Stool Occurrence 1 x               Physical Exam  Vitals and nursing note reviewed.   Constitutional:       Appearance: Normal appearance. He is well-developed.   HENT:      Head: Normocephalic and atraumatic.      Nose: Nose normal.      Mouth/Throat:      Mouth: Mucous membranes are dry.      Pharynx: No oropharyngeal exudate or posterior oropharyngeal erythema.      Comments: Lips chapped  Eyes:      General:         Right eye: No discharge.         Left eye: No discharge.      Extraocular Movements: Extraocular movements intact.      Conjunctiva/sclera: Conjunctivae normal.   Cardiovascular:      Rate and Rhythm: Normal rate and  regular rhythm.      Heart sounds: Normal heart sounds. No murmur heard.  Pulmonary:      Effort: Pulmonary effort is normal. No respiratory distress.      Breath sounds: Normal breath sounds. No wheezing or rales.   Abdominal:      General: Bowel sounds are normal. There is no distension.      Palpations: Abdomen is soft.      Tenderness: There is no abdominal tenderness.   Musculoskeletal:         General: No deformity. Normal range of motion.      Cervical back: Normal range of motion and neck supple.      Right lower leg: No edema.      Left lower leg: No edema.   Skin:     General: Skin is warm and dry.      Findings: Bruising present. No erythema or rash.      Comments: Right chest wall vas cath. Dressing c/d/i. No sign of infection to site.   Neurological:      General: No focal deficit present.      Mental Status: He is alert and oriented to person, place, and time.      Motor: No weakness.   Psychiatric:         Mood and Affect: Mood normal.         Behavior: Behavior normal.         Thought Content: Thought content normal.         Judgment: Judgment normal.            Significant Labs:   CBC:   Recent Labs   Lab 10/08/24  0404 10/09/24  0357   WBC 0.35* 0.59*   HGB 7.3* 6.4*   HCT 21.3* 18.6*   PLT 6* 18*    and CMP:   Recent Labs   Lab 10/08/24  0404 10/09/24  0357    138   K 4.6 4.5    108   CO2 24 25   * 108   BUN 8 7*   CREATININE 0.7 0.6   CALCIUM 8.7 7.9*   PROT 5.5* 4.9*   ALBUMIN 2.8* 2.6*   BILITOT 0.7 0.6   ALKPHOS 56 49*   AST 15 16   ALT 11 13   ANIONGAP 7* 5*       Diagnostic Results:  I have reviewed all pertinent imaging results/findings within the past 24 hours.

## 2024-10-09 NOTE — PLAN OF CARE
Patient is Kosovan speaking, wife is . Day +20 haplo BMT. Patient AAOX4, VSS, afebrile, on room air. Patient had no complaints overnight. Refused scheduled compazine x1. Dr. Lockett ordered for large oral meds to be held due to difficulty swallowing, follow up in the morning with day shift MDs. Patient up to the bathroom with walker and 1x assist (wife assisted patient with ADLs), free from falls and injuries. I&O's monitored as ordered. Bed in lowest position, side rails up x2, non-skid socks on, call light in reach. Patient educated to use call light for any needs, patient verbalizes understanding. There are no concerns at this time. Plan of care on going.

## 2024-10-09 NOTE — ASSESSMENT & PLAN NOTE
From Dr Hickey's clinic note 8/5:  Stem cell transplant candidate: We discussed the role for allogeneic stem cell transplantation in this disease process as a potentially curative option. We had an extensive discussion about the rationale, logistics, risks, and benefits. We reviewed the requirement to stay in the [place] area for 100 days with a caregiver at all times. We discussed the risks, including infection, graft failure, organ toxicity, graft versus host disease, relapse of disease, and secondary cancers. We reviewed the need for long-term immunosuppression and need for close monitoring. HCT-CI of 1 (intermediate risk). We had a prolonged discussion today regarding his recent deconditioning, Cdiff, and underlying disease. He is now much improved since last seen, and is improving his strength since starting to work with PT. Diarrhea now controlled. We discussed that our plan to move forward with the transplant process.   Coordinator: aFy Stephens  Regimen:  + 2Gy TBI  Donor: son (haplo)   Graft source: BM  - Admitted 9/13/24 for a  TBI PT Cy haplo (son) allogeneic BMT

## 2024-10-09 NOTE — ASSESSMENT & PLAN NOTE
- Patient of Dr. 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 Today is Day +20  Engrafted today 10/9 with ANC of 531  - Tacro level 6.4 on 10/9; increasing dose to 0.5mg AM and 1mg PM. Next level 10/11  -Continue daily acute GVHD charting while inpatient. None noted thus far.  - Patient is B+. Donor is A+.  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 Patient will start letermovir ppx on 9/24/24.  - Received fresh BMT product on 9/19/24 with a CD34 dose of 3.55 x10^6.  - Of note, cilostazol may interact with tacro. (Currently on hold for plts < 50K).  - See treatment plan below:    Planned conditioning regimen:  Fludarabine on Day -5, -4, -3, and -2  Melphalan on Day -2  TBI on Day -1     Planned  GVHD Prophylaxis:  Cyclophosphamide (with Mesna) on Days +3 and +4  Mycophenolate starting on Day +5  Tacrolimus starting on Day +5     Antimicrobial Prophylaxis:  Acyclovir starting on Day -5  Levofloxacin starting on Day -1  Micafungin on Day -1 through Day +4  Letermovir starting on Day +5 (if CMV PCR drawn on day 0 results negative)  Posaconazole starting on Day +5  Bactrim starting on Day +30     SOS/VOD Prophylaxis:  Ursodiol on Day -5 through Day +30      Growth Factor Support:  Neupogen starting on Day +5     Caregiver: wife   Post-transplant discharge plans: home

## 2024-10-09 NOTE — PT/OT/SLP PROGRESS
Occupational Therapy   Treatment    Name: Guillaume Salinas  MRN: [id number]  Admitting Diagnosis:  S/P allogeneic bone marrow transplant       Recommendations:     Discharge Recommendations: Low Intensity Therapy  Discharge Equipment Recommendations:  walker, rolling  Barriers to discharge:  None    Assessment:     Guillaume Salinas is a 69 y.o. male with a medical diagnosis of S/P allogeneic bone marrow transplant.  He presents with c/o fatigue. Performance deficits affecting function are weakness, impaired endurance, impaired self care skills, impaired functional mobility, gait instability, impaired balance, impaired cardiopulmonary response to activity, edema, pain, decreased lower extremity function, decreased upper extremity function. Patient limited by deconditioning and low Hgb (6.4). Patient was able to participate in standing ADLs today, but did report more fatigue than prior days requiring prolonged seated rest break. Wife providing interpretation during session per patient preference deferring Language Line services. Patient continues to demonstrate the need for low intensity therapy on a scheduled basis exhibited by decreased independence with self-care and functional mobility    Rehab Prognosis:  Good; patient would benefit from acute skilled OT services to address these deficits and reach maximum level of function.       Plan:     Patient to be seen 2 x/week to address the above listed problems via self-care/home management, therapeutic activities, therapeutic exercises, neuromuscular re-education  Plan of Care Expires: 10/15/24  Plan of Care Reviewed with: patient, spouse    Subjective     Chief Complaint: c/o feeling weak affecting steadiness during standing ADLs  Patient/Family Comments/goals: daughter reported via phone with patient wife present wanting patient to get more therapy at home post discharge   Pain/Comfort:  Pain Rating 1: 0/10  Pain Rating Post-Intervention 1:  0/10    Objective:     Communicated with: nurse varghese prior to session.  Patient found up in chair with  (no active lines) upon OT entry to room.    General Precautions: Standard, fall    Orthopedic Precautions:N/A  Braces: N/A  Respiratory Status: Room air     Occupational Performance:     Bed Mobility:    Patient received OOB    Functional Mobility/Transfers:  Patient completed Sit <> Stand Transfer with contact guard assistance  with  rolling walker   Functional Mobility: deferred 2/2 low Hgb    Activities of Daily Living:  Grooming: stand by assistance oral and facial hygiene standing via elevated tabletop using RW (EVS reported wet floor in bathroom needing time to dry)  Patient tolerated x2.34 min standing requiring seated rest break before transitioning to seated therEx      [facility] 6 Click ADL: 20    Treatment & Education:  Patient edu on goals and current progress. Discussed discharge OT rec per inquiry. Patient completed B UE straight arm raises x 10 reps seated in chair. L UE PROM shoulder flexion and add/abd x10 reps each. Addressed all patient/spouse questions/concerns within [facility] scope of practice.     Patient left up in chair with all lines intact, call button in reach, and wife present    GOALS:   Multidisciplinary Problems       Occupational Therapy Goals          Problem: Occupational Therapy    Goal Priority Disciplines Outcome Interventions   Occupational Therapy Goal     OT, PT/OT Progressing    Description: Goals to be met by: 10/29/2024     Patient will increase functional independence with ADLs by performing:    Pt will demonstrate independence with grooming x 3 sessions.  Pt will demonstrate independence with UE dressing x 3 sessions.  Pt will demonstrate independence with LE dressing x 3 sessions.  Pt will demonstrate independence with Toileting x 3 sessions.  Pt will demonstrate independence with transfers x 3 sessions.  Pt will complete functional mobility to simulate community distances  with [place] x 3 sessions in order to maximize functional activity tolerance required for occupations of choice.   Pt will demonstrate independence with HEP for UE strengthening/endurance with independence.                           Time Tracking:     OT Date of Treatment: 10/09/24  OT Start Time: 1100  OT Stop Time: 1119  OT Total Time (min): 19 min    Billable Minutes:Self Care/Home Management 19    OT/DIMITRIS: DIMITRIS     Number of DIMITRIS visits since last OT visit: 1    10/9/2024

## 2024-10-09 NOTE — PROGRESS NOTES
The  will arrange to have additional funds added to the patient's food card and the social obtained the patient's preference for a home health agency. The patient's home health preference is [facility].

## 2024-10-09 NOTE — PROGRESS NOTES
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 Day 1 of engraftment today with an ANC of 531. Tacro level 6.4. Increasing dose to 0.5mg AM and 1mg PM. Nausea better with zyprexa. Diarrhea controlled with prn imodium. Reports feels like some large pills get stuck when swallowing; encouraged patient to take slowly with plenty of water. Will stop IVF today. PT/OT recommending HH. Afebrile, VSS    Objective:     Vital Signs (Most Recent):  Temp: 98.8 °F (37.1 °C) (10/09/24 1047)  Pulse: 96 (10/09/24 1047)  Resp: 18 (10/09/24 1047)  BP: 136/80 (10/09/24 1047)  SpO2: (!) 90 % (10/09/24 1047) Vital Signs (24h Range):  Temp:  [97.8 °F (36.6 °C)-99.5 °F (37.5 °C)] 98.8 °F (37.1 °C)  Pulse:  [] 96  Resp:  [14-20] 18  SpO2:  [90 %-98 %] 90 %  BP: (130-157)/(65-81) 136/80     Weight: 76 kg (167 lb 7 oz)  Body mass index is 24.73 kg/m².  Body surface area is 1.92 meters squared.      Intake/Output - Last 3 Shifts         10/07 0700  10/08 0659 10/08 0700  10/09 0659 10/09 0700  10/10 0659    P.O. 400 400     I.V. (mL/kg)  2412 (31.8)     Blood   230    Total Intake(mL/kg) 400 (5.3) 2812 (37) 230 (3)    Urine (mL/kg/hr)  900 (0.5) 1100 (2.6)    Stool       Total Output  900 1100    Net +400 +1912 -870           Urine Occurrence 752 x      Stool Occurrence 1 x               Physical Exam  Vitals and nursing note reviewed.   Constitutional:       Appearance: Normal appearance. He is well-developed.   HENT:      Head: Normocephalic and atraumatic.      Nose: Nose normal.      Mouth/Throat:      Mouth: Mucous membranes are dry.      Pharynx: No oropharyngeal exudate or posterior oropharyngeal erythema.      Comments: Lips chapped  Eyes:      General:         Right  eye: No discharge.         Left eye: No discharge.      Extraocular Movements: Extraocular movements intact.      Conjunctiva/sclera: Conjunctivae normal.   Cardiovascular:      Rate and Rhythm: Normal rate and regular rhythm.      Heart sounds: Normal heart sounds. No murmur heard.  Pulmonary:      Effort: Pulmonary effort is normal. No respiratory distress.      Breath sounds: Normal breath sounds. No wheezing or rales.   Abdominal:      General: Bowel sounds are normal. There is no distension.      Palpations: Abdomen is soft.      Tenderness: There is no abdominal tenderness.   Musculoskeletal:         General: No deformity. Normal range of motion.      Cervical back: Normal range of motion and neck supple.      Right lower leg: No edema.      Left lower leg: No edema.   Skin:     General: Skin is warm and dry.      Findings: Bruising present. No erythema or rash.      Comments: Right chest wall vas cath. Dressing c/d/i. No sign of infection to site.   Neurological:      General: No focal deficit present.      Mental Status: He is alert and oriented to person, place, and time.      Motor: No weakness.   Psychiatric:         Mood and Affect: Mood normal.         Behavior: Behavior normal.         Thought Content: Thought content normal.         Judgment: Judgment normal.            Significant Labs:   CBC:   Recent Labs   Lab 10/08/24  0404 10/09/24  0357   WBC 0.35* 0.59*   HGB 7.3* 6.4*   HCT 21.3* 18.6*   PLT 6* 18*    and CMP:   Recent Labs   Lab 10/08/24  0404 10/09/24  0357    138   K 4.6 4.5    108   CO2 24 25   * 108   BUN 8 7*   CREATININE 0.7 0.6   CALCIUM 8.7 7.9*   PROT 5.5* 4.9*   ALBUMIN 2.8* 2.6*   BILITOT 0.7 0.6   ALKPHOS 56 49*   AST 15 16   ALT 11 13   ANIONGAP 7* 5*       Diagnostic Results:  I have reviewed all pertinent imaging results/findings within the past 24 hours.  Assessment/Plan:     * S/P allogeneic bone marrow transplant  - Patient of Dr. Paco Hickey with  MDS  - Admitted 9/13/24 for a  TBI PT Cy haplo (son) allogeneic SCT. Today is Day +20  Engrafted today 10/9 with ANC of 531  - Tacro level 6.4 on 10/9; increasing dose to 0.5mg AM and 1mg PM. Next level 10/11  -Continue daily acute GVHD charting while inpatient. None noted thus far.  - Patient is B+. Donor is A+.  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 Patient will start letermovir ppx on 9/24/24.  - Received fresh BMT product on 9/19/24 with a CD34 dose of 3.55 x10^6.  - Of note, cilostazol may interact with tacro. (Currently on hold for plts < 50K).  - See treatment plan below:    Planned conditioning regimen:  Fludarabine on Day -5, -4, -3, and -2  Melphalan on Day -2  TBI on Day -1     Planned  GVHD Prophylaxis:  Cyclophosphamide (with Mesna) on Days +3 and +4  Mycophenolate starting on Day +5  Tacrolimus starting on Day +5     Antimicrobial Prophylaxis:  Acyclovir starting on Day -5  Levofloxacin starting on Day -1  Micafungin on Day -1 through Day +4  Letermovir starting on Day +5 (if CMV PCR drawn on day 0 results negative)  Posaconazole starting on Day +5  Bactrim starting on Day +30     SOS/VOD Prophylaxis:  Ursodiol on Day -5 through Day +30      Growth Factor Support:  Neupogen starting on Day +5     Caregiver: wife   Post-transplant discharge plans: home    Myelodysplastic syndrome  From Dr Hickey's clinic note 8/5:  Stem cell transplant candidate: We discussed the role for allogeneic stem cell transplantation in this disease process as a potentially curative option. We had an extensive discussion about the rationale, logistics, risks, and benefits. We reviewed the requirement to stay in the [place] area for 100 days with a caregiver at all times. We discussed the risks, including infection, graft failure, organ toxicity, graft versus host disease, relapse of disease, and secondary cancers. We reviewed the need for long-term immunosuppression and need for close monitoring. HCT-CI of  1 (intermediate risk). We had a prolonged discussion today regarding his recent deconditioning, Cdiff, and underlying disease. He is now much improved since last seen, and is improving his strength since starting to work with PT. Diarrhea now controlled. We discussed that our plan to move forward with the transplant process.   Coordinator: Fay Stephens  Regimen:  + 2Gy TBI  Donor: son (haplo)   Graft source: BM  - Admitted 9/13/24 for a  TBI PT Cy haplo (son) allogeneic BMT    Pancytopenia due to antineoplastic chemotherapy  - Daily CBC while inpatient  - Transfuse for hgb <7 Plt  or <10K  - Continue antimicrobial ppx  - Holding cilostazol for plts < 50K   today    Chemotherapy induced diarrhea  - C-diff neg 9/24  - Of note, was treated empirically for c-diff with oral vanc prior to admission and is receiving oral vanc ppx while admitted.  - Receiving scheduled imodium and PRN lomotil  - Started Questran 10/1.  - Diarrhea improved, so changied imodium from scheduled to PRN    Hemorrhoids  - d/t chemo induced diarrhea  - has anusol, tucks pads, and LETS gel  - patient denies any bleeding at site  - has PRN morphine for pain control  - hemorrhoid pain improving with diarrhea control    Neutropenic fever  - First fever with tmax 101.8F on 9/23  - Infection w/u unremarkable thus far  - No fevers since 9/23, Cefepime stopped on 9/25 and back on oral ppx LVQ.  RESOLVED    Hypomagnesemia  - See electrolyte disorder  - replace PRN  - Tacro likely contributing  - will need scheduled magnesium on discharge (currently on 800mg BID)    Electrolyte disorder  - Replete per PRN electrolyte order set  - Daily CMP, mag, and phos while inpatient    Moderate malnutrition  Nutrition consulted. Most recent weight and BMI monitored-     Measurements:  Wt Readings from Last 1 Encounters:   10/08/24 76 kg (167 lb 7 oz)   Body mass index is 24.73 kg/m².    Patient has been screened and assessed by RD.    - Switched boost  plus to boost breeze as diary of plus making patient nauseous  - PO intake as tolerated; on scheduled antiemetics for nausea  - stopped IVF with engraftment; encouraged increased PO intake in order to DC    Dry mouth  - continue to moisten lips with chapstick  - encouraged use of oral hygiene rinses to sterilize mouth and stimulate saliva production    Urinary frequency  - reports increased nightly frequency and urgency with low output  - no formal BPH diagnosis; started flomax 9/27  - improved    Chemotherapy-induced nausea  - scheduled zofran IV 8mg q8h on 9/27  - added scheduled compazine 9/30; changed back to PRN on 10/9 with engraftment and impending DC  - added 5mg zyprexa at night 10/8  - has prn ativan      Headache  - C/o headache 9/25  - Daily coffee drinker. Improved after drinking coffee.  - Suspect caffeine withdrawal.  - Can start caffeine tablet if patient is unable to drink coffee due to chemo side effects  - none today    Insomnia  - states melatonin does not work  - started on scheduled trazodone 10/8    History of Clostridioides difficile infection  - Was treated empirically for c-diff with oral vanc prior to admission.  - Continue oral vanc ppx per transplant protocol      Neuropathy  - Continue home gabapentin    Hypertension, essential  - Was holding home Coreg for fluid responsive hypotension. Resumed 9/30.    Coronary artery disease involving native coronary artery of native heart without angina pectoris  - Was holding home Coreg for hypotension. Resumed 9/30.    Physical debility  - PT/OT following while inpatient  - recommending HH with rolling walker  - The mobility limitation cannot be sufficiently resolved by the use of a cane. Patient's functional mobility deficit can be sufficiently resolved with the use of a Rolling Walker. Patient's mobility limitation significantly impairs their ability to participate in one of more activities of daily living.  The use of a Rolling Walker, Walker  will significantly improve the patient's ability to participate in MRADLS and the patient will use it on regular basis in the home.        VTE Risk Mitigation (From admission, onward)           Ordered     heparin, porcine (PF) 100 unit/mL injection flush 300 Units  As needed (PRN)         09/13/24 6955                    Disposition: Remains inpatient. Day 1 of engraftment today. Patient needs to improve oral intake prior to DC    Judy Anthony NP  Bone Marrow Transplant  [facility] - Oncology ([facility])

## 2024-10-10 LAB
ALBUMIN SERPL BCP-MCNC: 2.6 G/DL (ref 3.5–5.2)
ALP SERPL-CCNC: 52 U/L (ref 55–135)
ALT SERPL W/O P-5'-P-CCNC: 14 U/L (ref 10–44)
ANION GAP SERPL CALC-SCNC: 9 MMOL/L (ref 8–16)
AST SERPL-CCNC: 16 U/L (ref 10–40)
BASOPHILS # BLD AUTO: ABNORMAL K/UL (ref 0–0.2)
BASOPHILS NFR BLD: 0 % (ref 0–1.9)
BILIRUB SERPL-MCNC: 0.7 MG/DL (ref 0.1–1)
BUN SERPL-MCNC: 9 MG/DL (ref 8–23)
CALCIUM SERPL-MCNC: 8.3 MG/DL (ref 8.7–10.5)
CHLORIDE SERPL-SCNC: 105 MMOL/L (ref 95–110)
CO2 SERPL-SCNC: 25 MMOL/L (ref 23–29)
CREAT SERPL-MCNC: 0.7 MG/DL (ref 0.5–1.4)
DIFFERENTIAL METHOD BLD: ABNORMAL
DOHLE BOD BLD QL SMEAR: PRESENT
EOSINOPHIL # BLD AUTO: ABNORMAL K/UL (ref 0–0.5)
EOSINOPHIL NFR BLD: 0 % (ref 0–8)
ERYTHROCYTE [DISTWIDTH] IN BLOOD BY AUTOMATED COUNT: 12.7 % (ref 11.5–14.5)
EST. GFR  (NO RACE VARIABLE): >60 ML/MIN/1.73 M^2
GLUCOSE SERPL-MCNC: 105 MG/DL (ref 70–110)
HCT VFR BLD AUTO: 21.2 % (ref 40–54)
HGB BLD-MCNC: 7.6 G/DL (ref 14–18)
IMM GRANULOCYTES # BLD AUTO: ABNORMAL K/UL (ref 0–0.04)
IMM GRANULOCYTES NFR BLD AUTO: ABNORMAL % (ref 0–0.5)
LYMPHOCYTES # BLD AUTO: ABNORMAL K/UL (ref 1–4.8)
LYMPHOCYTES NFR BLD: 2 % (ref 18–48)
MAGNESIUM SERPL-MCNC: 1.3 MG/DL (ref 1.6–2.6)
MCH RBC QN AUTO: 30.2 PG (ref 27–31)
MCHC RBC AUTO-ENTMCNC: 35.8 G/DL (ref 32–36)
MCV RBC AUTO: 84 FL (ref 82–98)
METAMYELOCYTES NFR BLD MANUAL: 1 %
MONOCYTES # BLD AUTO: ABNORMAL K/UL (ref 0.3–1)
MONOCYTES NFR BLD: 5 % (ref 4–15)
MYELOCYTES NFR BLD MANUAL: 1 %
NEUTROPHILS NFR BLD: 85 % (ref 38–73)
NEUTS BAND NFR BLD MANUAL: 6 %
NRBC BLD-RTO: 0 /100 WBC
OVALOCYTES BLD QL SMEAR: ABNORMAL
PHOSPHATE SERPL-MCNC: 3.1 MG/DL (ref 2.7–4.5)
PLATELET # BLD AUTO: 13 K/UL (ref 150–450)
PLATELET BLD QL SMEAR: ABNORMAL
PMV BLD AUTO: 11.3 FL (ref 9.2–12.9)
POIKILOCYTOSIS BLD QL SMEAR: SLIGHT
POTASSIUM SERPL-SCNC: 3.9 MMOL/L (ref 3.5–5.1)
PROT SERPL-MCNC: 5.1 G/DL (ref 6–8.4)
RBC # BLD AUTO: 2.52 M/UL (ref 4.6–6.2)
SCHISTOCYTES BLD QL SMEAR: ABNORMAL
SCHISTOCYTES BLD QL SMEAR: PRESENT
SODIUM SERPL-SCNC: 139 MMOL/L (ref 136–145)
SPHEROCYTES BLD QL SMEAR: ABNORMAL
TOXIC GRANULES BLD QL SMEAR: PRESENT
WBC # BLD AUTO: 1.26 K/UL (ref 3.9–12.7)
WBC TOXIC VACUOLES BLD QL SMEAR: PRESENT

## 2024-10-10 PROCEDURE — 80053 COMPREHEN METABOLIC PANEL: CPT | Performed by: NURSE PRACTITIONER

## 2024-10-10 PROCEDURE — 97116 GAIT TRAINING THERAPY: CPT | Mod: CQ

## 2024-10-10 PROCEDURE — 63600175 PHARM REV CODE 636 W HCPCS: Performed by: NURSE PRACTITIONER

## 2024-10-10 PROCEDURE — 85027 COMPLETE CBC AUTOMATED: CPT | Performed by: NURSE PRACTITIONER

## 2024-10-10 PROCEDURE — 63600175 PHARM REV CODE 636 W HCPCS: Mod: JZ,JG | Performed by: INTERNAL MEDICINE

## 2024-10-10 PROCEDURE — 20600001 HC STEP DOWN PRIVATE ROOM

## 2024-10-10 PROCEDURE — 25000003 PHARM REV CODE 250: Performed by: NURSE PRACTITIONER

## 2024-10-10 PROCEDURE — 85007 BL SMEAR W/DIFF WBC COUNT: CPT | Performed by: NURSE PRACTITIONER

## 2024-10-10 PROCEDURE — 84100 ASSAY OF PHOSPHORUS: CPT | Performed by: NURSE PRACTITIONER

## 2024-10-10 PROCEDURE — 83735 ASSAY OF MAGNESIUM: CPT | Performed by: NURSE PRACTITIONER

## 2024-10-10 PROCEDURE — 25000003 PHARM REV CODE 250: Performed by: INTERNAL MEDICINE

## 2024-10-10 RX ORDER — OXYCODONE HYDROCHLORIDE 5 MG/1
5 TABLET ORAL EVERY 4 HOURS PRN
Status: DISCONTINUED | OUTPATIENT
Start: 2024-10-10 | End: 2024-10-16 | Stop reason: HOSPADM

## 2024-10-10 RX ORDER — ONDANSETRON 8 MG/1
8 TABLET, ORALLY DISINTEGRATING ORAL EVERY 8 HOURS
Status: DISCONTINUED | OUTPATIENT
Start: 2024-10-10 | End: 2024-10-16 | Stop reason: HOSPADM

## 2024-10-10 RX ORDER — GABAPENTIN 250 MG/5ML
600 SOLUTION ORAL 2 TIMES DAILY
Status: DISCONTINUED | OUTPATIENT
Start: 2024-10-10 | End: 2024-10-16 | Stop reason: HOSPADM

## 2024-10-10 RX ORDER — MAGNESIUM SULFATE HEPTAHYDRATE 40 MG/ML
2 INJECTION, SOLUTION INTRAVENOUS
Status: COMPLETED | OUTPATIENT
Start: 2024-10-10 | End: 2024-10-10

## 2024-10-10 RX ORDER — ACYCLOVIR 200 MG/5ML
800 SUSPENSION ORAL 2 TIMES DAILY
Status: DISCONTINUED | OUTPATIENT
Start: 2024-10-10 | End: 2024-10-16 | Stop reason: HOSPADM

## 2024-10-10 RX ORDER — MYCOPHENOLATE MOFETIL 200 MG/ML
1000 POWDER, FOR SUSPENSION ORAL 3 TIMES DAILY
Status: DISCONTINUED | OUTPATIENT
Start: 2024-10-10 | End: 2024-10-16 | Stop reason: HOSPADM

## 2024-10-10 RX ADMIN — TACROLIMUS 0.5 MG: 0.5 CAPSULE ORAL at 08:10

## 2024-10-10 RX ADMIN — Medication 1 DOSE: at 08:10

## 2024-10-10 RX ADMIN — OLANZAPINE 5 MG: 2.5 TABLET, FILM COATED ORAL at 09:10

## 2024-10-10 RX ADMIN — LIDOCAINE 5% 1 PATCH: 700 PATCH TOPICAL at 08:10

## 2024-10-10 RX ADMIN — POSACONAZOLE 300 MG: 100 TABLET, DELAYED RELEASE ORAL at 08:10

## 2024-10-10 RX ADMIN — VANCOMYCIN HYDROCHLORIDE 125 MG: KIT at 09:10

## 2024-10-10 RX ADMIN — PANTOPRAZOLE SODIUM 40 MG: 40 TABLET, DELAYED RELEASE ORAL at 08:10

## 2024-10-10 RX ADMIN — GABAPENTIN 600 MG: 250 SOLUTION ORAL at 09:10

## 2024-10-10 RX ADMIN — LETERMOVIR 480 MG: 480 TABLET, FILM COATED ORAL at 09:10

## 2024-10-10 RX ADMIN — URSODIOL 300 MG: 300 CAPSULE ORAL at 09:10

## 2024-10-10 RX ADMIN — TACROLIMUS 1 MG: 1 CAPSULE ORAL at 05:10

## 2024-10-10 RX ADMIN — HYDROCORTISONE: 25 CREAM TOPICAL at 09:10

## 2024-10-10 RX ADMIN — CARVEDILOL 6.25 MG: 6.25 TABLET, FILM COATED ORAL at 08:10

## 2024-10-10 RX ADMIN — ONDANSETRON 8 MG: 8 TABLET, ORALLY DISINTEGRATING ORAL at 09:10

## 2024-10-10 RX ADMIN — PROCHLORPERAZINE EDISYLATE 5 MG: 5 INJECTION INTRAMUSCULAR; INTRAVENOUS at 05:10

## 2024-10-10 RX ADMIN — MYCOPHENOLATE MOFETIL 1000 MG: 200 POWDER, FOR SUSPENSION ORAL at 02:10

## 2024-10-10 RX ADMIN — PROCHLORPERAZINE EDISYLATE 5 MG: 5 INJECTION INTRAMUSCULAR; INTRAVENOUS at 11:10

## 2024-10-10 RX ADMIN — MYCOPHENOLATE MOFETIL 1000 MG: 200 POWDER, FOR SUSPENSION ORAL at 09:10

## 2024-10-10 RX ADMIN — VANCOMYCIN HYDROCHLORIDE 125 MG: KIT at 08:10

## 2024-10-10 RX ADMIN — Medication 1 DOSE: at 11:10

## 2024-10-10 RX ADMIN — ONDANSETRON 8 MG: 2 INJECTION INTRAMUSCULAR; INTRAVENOUS at 05:10

## 2024-10-10 RX ADMIN — TRAZODONE HYDROCHLORIDE 50 MG: 50 TABLET ORAL at 09:10

## 2024-10-10 RX ADMIN — ONDANSETRON 8 MG: 8 TABLET, ORALLY DISINTEGRATING ORAL at 01:10

## 2024-10-10 RX ADMIN — Medication: at 08:10

## 2024-10-10 RX ADMIN — Medication 1 DOSE: at 05:10

## 2024-10-10 RX ADMIN — MAGNESIUM SULFATE HEPTAHYDRATE 2 G: 40 INJECTION, SOLUTION INTRAVENOUS at 11:10

## 2024-10-10 RX ADMIN — FILGRASTIM 300 MCG: 300 INJECTION, SOLUTION INTRAVENOUS; SUBCUTANEOUS at 08:10

## 2024-10-10 RX ADMIN — LORAZEPAM 1 MG: 2 INJECTION INTRAMUSCULAR; INTRAVENOUS at 05:10

## 2024-10-10 RX ADMIN — Medication: at 09:10

## 2024-10-10 RX ADMIN — MAGNESIUM SULFATE HEPTAHYDRATE 2 G: 40 INJECTION, SOLUTION INTRAVENOUS at 09:10

## 2024-10-10 RX ADMIN — ACYCLOVIR 800 MG: 200 SUSPENSION ORAL at 09:10

## 2024-10-10 RX ADMIN — CARVEDILOL 6.25 MG: 6.25 TABLET, FILM COATED ORAL at 09:10

## 2024-10-10 RX ADMIN — Medication 1 DOSE: at 09:10

## 2024-10-10 NOTE — ASSESSMENT & PLAN NOTE
- continue to moisten lips with chapstick  - encouraged use of oral hygiene rinses to sterilize mouth and stimulate saliva production  - consider speech consult on 10/11 if patient still reporting difficulties swallowing

## 2024-10-10 NOTE — ASSESSMENT & PLAN NOTE
- C-diff neg 9/24  - Of note, was treated empirically for c-diff with oral vanc prior to admission and is receiving oral vanc ppx while admitted.  - Receiving scheduled imodium and PRN lomotil  - Started Questran 10/1.  - Diarrhea improved, so changed imodium from scheduled to PRN

## 2024-10-10 NOTE — PLAN OF CARE
Day +21 FluMel Haplo SCT. Patient AAOx4. Afebrile and VSS on 1L oxygen. Patient put on oxygen at 4 o'clock vitals after complaining of feeling short of breath. O2 sats range 91-94%. Patient is up with assist with a walker. Free from falls and injury this shift. Family at bedside. Monitoring I&Os and CHG wipes given for self care. No BM overnight. Bed is locked and in lowest position and call light within reach. Patient and family educated on using the call light when needing assistance and both verbalize understanding. Plan of care reviewed and is ongoing. Patient expresses no other needs at this time.     Patient is still struggling to get down large pills such as gabapentin and acyclovir. Notified BMT day team.

## 2024-10-10 NOTE — PROGRESS NOTES
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 Day 2 of engraftment today with an ANC of 1147. Still having difficulty taking large pills, meds changed to liquid as able. If persists, will consider speech eval tomorrow. Patient still with very limited PO intake. Nausea controlled with zyprexa. IV compazine and zofran changed to PO. Still with intermittent SOB although O2 sats WNL. Encouraged ambulation and use of incentive spirometer. Replacing mag. Afebrile, VSS    Objective:     Vital Signs (Most Recent):  Temp: 98.3 °F (36.8 °C) (10/10/24 1121)  Pulse: 88 (10/10/24 1121)  Resp: 18 (10/10/24 1121)  BP: 130/78 (10/10/24 1121)  SpO2: (!) 94 % (10/10/24 1121) Vital Signs (24h Range):  Temp:  [98 °F (36.7 °C)-99.4 °F (37.4 °C)] 98.3 °F (36.8 °C)  Pulse:  [] 88  Resp:  [16-20] 18  SpO2:  [91 %-95 %] 94 %  BP: (118-162)/(63-87) 130/78     Weight: 76 kg (167 lb 7 oz)  Body mass index is 24.73 kg/m².  Body surface area is 1.92 meters squared.      Intake/Output - Last 3 Shifts         10/08 0700  10/09 0659 10/09 0700  10/10 0659 10/10 0700  10/11 0659    P.O. 400 1500 118    I.V. (mL/kg) 2412 (31.8) 275.9 (3.6)     Blood  230     Total Intake(mL/kg) 2812 (37) 2005.9 (26.4) 118 (1.6)    Urine (mL/kg/hr) 900 (0.5) 2600 (1.4)     Stool  0     Total Output 900 2600     Net +1912 -594.1 +118           Stool Occurrence  0 x              Physical Exam  Vitals and nursing note reviewed.   Constitutional:       Appearance: Normal appearance. He is well-developed.   HENT:      Head: Normocephalic and atraumatic.      Nose: Nose normal.      Mouth/Throat:      Mouth: Mucous membranes are dry.      Pharynx: No oropharyngeal exudate or posterior oropharyngeal  erythema.      Comments: Lips chapped  Eyes:      General:         Right eye: No discharge.         Left eye: No discharge.      Extraocular Movements: Extraocular movements intact.      Conjunctiva/sclera: Conjunctivae normal.   Cardiovascular:      Rate and Rhythm: Normal rate and regular rhythm.      Heart sounds: Normal heart sounds. No murmur heard.  Pulmonary:      Effort: Pulmonary effort is normal. No respiratory distress.      Breath sounds: Normal breath sounds. No wheezing or rales.   Abdominal:      General: Bowel sounds are normal. There is no distension.      Palpations: Abdomen is soft.      Tenderness: There is no abdominal tenderness.   Musculoskeletal:         General: No deformity. Normal range of motion.      Cervical back: Normal range of motion and neck supple.      Right lower leg: No edema.      Left lower leg: No edema.   Skin:     General: Skin is warm and dry.      Findings: Bruising present. No erythema or rash.      Comments: Right chest wall vas cath. Dressing c/d/i. No sign of infection to site.   Neurological:      General: No focal deficit present.      Mental Status: He is alert and oriented to person, place, and time.   Psychiatric:         Mood and Affect: Mood normal.         Behavior: Behavior normal.         Thought Content: Thought content normal.         Judgment: Judgment normal.            Significant Labs:   CBC:   Recent Labs   Lab 10/09/24  0357 10/10/24  0324   WBC 0.59* 1.26*   HGB 6.4* 7.6*   HCT 18.6* 21.2*   PLT 18* 13*    and CMP:   Recent Labs   Lab 10/09/24  0357 10/10/24  0324    139   K 4.5 3.9    105   CO2 25 25    105   BUN 7* 9   CREATININE 0.6 0.7   CALCIUM 7.9* 8.3*   PROT 4.9* 5.1*   ALBUMIN 2.6* 2.6*   BILITOT 0.6 0.7   ALKPHOS 49* 52*   AST 16 16   ALT 13 14   ANIONGAP 5* 9       Diagnostic Results:  I have reviewed all pertinent imaging results/findings within the past 24 hours.  Assessment/Plan:     * S/P allogeneic bone marrow  transplant  - Patient of Dr. 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 Today is Day +21  Engrafted 10/9 with ANC of 531  - ANC today 1147  - Tacro level 6.4 on 10/9; continue dose of 0.5mg AM and 1mg PM. Next level 10/11  -Continue daily acute GVHD charting while inpatient. None noted thus far.  - Patient is B+. Donor is A+.  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 Patient will start letermovir ppx on 9/24/24.  - Received fresh BMT product on 9/19/24 with a CD34 dose of 3.55 x10^6.  - Of note, cilostazol may interact with tacro. (Currently on hold for plts < 50K).  - See treatment plan below:    Planned conditioning regimen:  Fludarabine on Day -5, -4, -3, and -2  Melphalan on Day -2  TBI on Day -1     Planned  GVHD Prophylaxis:  Cyclophosphamide (with Mesna) on Days +3 and +4  Mycophenolate starting on Day +5  Tacrolimus starting on Day +5     Antimicrobial Prophylaxis:  Acyclovir starting on Day -5  Levofloxacin starting on Day -1  Micafungin on Day -1 through Day +4  Letermovir starting on Day +5 (if CMV PCR drawn on day 0 results negative)  Posaconazole starting on Day +5  Bactrim starting on Day +30     SOS/VOD Prophylaxis:  Ursodiol on Day -5 through Day +30      Growth Factor Support:  Neupogen starting on Day +5     Caregiver: wife   Post-transplant discharge plans: home    Myelodysplastic syndrome  From Dr Hickey's clinic note 8/5:  Stem cell transplant candidate: We discussed the role for allogeneic stem cell transplantation in this disease process as a potentially curative option. We had an extensive discussion about the rationale, logistics, risks, and benefits. We reviewed the requirement to stay in the [place] area for 100 days with a caregiver at all times. We discussed the risks, including infection, graft failure, organ toxicity, graft versus host disease, relapse of disease, and secondary cancers. We reviewed the need for long-term  immunosuppression and need for close monitoring. HCT-CI of 1 (intermediate risk). We had a prolonged discussion today regarding his recent deconditioning, Cdiff, and underlying disease. He is now much improved since last seen, and is improving his strength since starting to work with PT. Diarrhea now controlled. We discussed that our plan to move forward with the transplant process.   Coordinator: Fay Stephens  Regimen:  + 2Gy TBI  Donor: son (haplo)   Graft source: BM  - Admitted 9/13/24 for a  TBI PT Cy haplo (son) allogeneic BMT    Pancytopenia due to antineoplastic chemotherapy  - Daily CBC while inpatient  - Transfuse for hgb <7 Plt  or <10K  - Continue acyclovir and posaconazole; levaquin stopped 10/10 with ANC >1000  - Holding cilostazol for plts < 50K  ANC 1147 today    Chemotherapy induced diarrhea  - C-diff neg 9/24  - Of note, was treated empirically for c-diff with oral vanc prior to admission and is receiving oral vanc ppx while admitted.  - Receiving scheduled imodium and PRN lomotil  - Started Questran 10/1.  - Diarrhea improved, so changed imodium from scheduled to PRN    Hemorrhoids  - d/t chemo induced diarrhea  - has anusol, tucks pads, and LETS gel  - patient denies any bleeding at site  - has PRN morphine for pain control  - hemorrhoid pain improving with diarrhea control    Neutropenic fever  - First fever with tmax 101.8F on 9/23  - Infection w/u unremarkable thus far  - No fevers since 9/23, Cefepime stopped on 9/25 and back on oral ppx LVQ.  - LVQ stopped 10/10 with ANC >1000  RESOLVED    Hypomagnesemia  - See electrolyte disorder  - replace PRN  - Tacro likely contributing  - will need scheduled magnesium on discharge (currently on 800mg BID)    Electrolyte disorder  - Replete per PRN electrolyte order set  - Daily CMP, mag, and phos while inpatient    Moderate malnutrition  Nutrition consulted. Most recent weight and BMI monitored-     Measurements:  Wt Readings from Last 1  Encounters:   10/08/24 76 kg (167 lb 7 oz)   Body mass index is 24.73 kg/m².    Patient has been screened and assessed by RD.    - Switched boost plus to boost breeze as diary of plus making patient nauseous  - PO intake as tolerated; on scheduled antiemetics for nausea  - stopped IVF with engraftment; encouraged increased PO intake in order to DC    Dry mouth  - continue to moisten lips with chapstick  - encouraged use of oral hygiene rinses to sterilize mouth and stimulate saliva production  - consider speech consult on 10/11 if patient still reporting difficulties swallowing    Urinary frequency  - reports increased nightly frequency and urgency with low output  - no formal BPH diagnosis; started flomax 9/27  - improved; stopped on 10/10    Chemotherapy-induced nausea  - scheduled zofran IV 8mg q8h on 9/27  - added scheduled compazine 9/30  - added 5mg zyprexa at night 10/8  - has prn ativan  - antiemetics now PO      Headache  - C/o headache 9/25  - Daily coffee drinker. Improved after drinking coffee.  - Suspect caffeine withdrawal.  - Can start caffeine tablet if patient is unable to drink coffee due to chemo side effects  - none today    Insomnia  - states melatonin does not work  - started on scheduled trazodone 10/8    History of Clostridioides difficile infection  - Was treated empirically for c-diff with oral vanc prior to admission.  - Continue oral vanc ppx per transplant protocol      Neuropathy  - Continue home gabapentin    Hypertension, essential  - Was holding home Coreg for fluid responsive hypotension. Resumed 9/30.    Coronary artery disease involving native coronary artery of native heart without angina pectoris  - Was holding home Coreg for hypotension. Resumed 9/30.    Physical debility  - PT/OT following while inpatient  - recommending HH with rolling walker  - The mobility limitation cannot be sufficiently resolved by the use of a cane. Patient's functional mobility deficit can be  sufficiently resolved with the use of a Rolling Walker. Patient's mobility limitation significantly impairs their ability to participate in one of more activities of daily living.  The use of a Rolling Walker, Walker will significantly improve the patient's ability to participate in MRADLS and the patient will use it on regular basis in the home.        VTE Risk Mitigation (From admission, onward)           Ordered     heparin, porcine (PF) 100 unit/mL injection flush 300 Units  As needed (PRN)         09/13/24 0492                    Disposition: Remains inpatient pending increased PO intake to safely discharge following BMT    Judy Anthony NP  Bone Marrow Transplant  [address] [facility] - Oncology ([facility])

## 2024-10-10 NOTE — ASSESSMENT & PLAN NOTE
- scheduled zofran IV 8mg q8h on 9/27  - added scheduled compazine 9/30  - added 5mg zyprexa at night 10/8  - has prn ativan  - antiemetics now PO

## 2024-10-10 NOTE — ASSESSMENT & PLAN NOTE
- Patient of Dr. 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 Today is Day +21  Engrafted 10/9 with ANC of 531  - ANC today 1147  - Tacro level 6.4 on 10/9; continue dose of 0.5mg AM and 1mg PM. Next level 10/11  -Continue daily acute GVHD charting while inpatient. None noted thus far.  - Patient is B+. Donor is A+.  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 Patient will start letermovir ppx on 9/24/24.  - Received fresh BMT product on 9/19/24 with a CD34 dose of 3.55 x10^6.  - Of note, cilostazol may interact with tacro. (Currently on hold for plts < 50K).  - See treatment plan below:    Planned conditioning regimen:  Fludarabine on Day -5, -4, -3, and -2  Melphalan on Day -2  TBI on Day -1     Planned  GVHD Prophylaxis:  Cyclophosphamide (with Mesna) on Days +3 and +4  Mycophenolate starting on Day +5  Tacrolimus starting on Day +5     Antimicrobial Prophylaxis:  Acyclovir starting on Day -5  Levofloxacin starting on Day -1  Micafungin on Day -1 through Day +4  Letermovir starting on Day +5 (if CMV PCR drawn on day 0 results negative)  Posaconazole starting on Day +5  Bactrim starting on Day +30     SOS/VOD Prophylaxis:  Ursodiol on Day -5 through Day +30      Growth Factor Support:  Neupogen starting on Day +5     Caregiver: wife   Post-transplant discharge plans: home

## 2024-10-10 NOTE — SUBJECTIVE & OBJECTIVE
Subjective:     Interval History: Day +21 s/p  + TBI + PT Cy Haploidentical (son) BMT for MDS. Day 2 of engraftment today with an ANC of 1147. Still having difficulty taking large pills, meds changed to liquid as able. If persists, will consider speech eval tomorrow. Patient still with very limited PO intake. Nausea controlled with zyprexa. IV compazine and zofran changed to PO. Still with intermittent SOB although O2 sats WNL. Encouraged ambulation and use of incentive spirometer. Replacing mag. Afebrile, VSS    Objective:     Vital Signs (Most Recent):  Temp: 98.3 °F (36.8 °C) (10/10/24 1121)  Pulse: 88 (10/10/24 1121)  Resp: 18 (10/10/24 1121)  BP: 130/78 (10/10/24 1121)  SpO2: (!) 94 % (10/10/24 1121) Vital Signs (24h Range):  Temp:  [98 °F (36.7 °C)-99.4 °F (37.4 °C)] 98.3 °F (36.8 °C)  Pulse:  [] 88  Resp:  [16-20] 18  SpO2:  [91 %-95 %] 94 %  BP: (118-162)/(63-87) 130/78     Weight: 76 kg (167 lb 7 oz)  Body mass index is 24.73 kg/m².  Body surface area is 1.92 meters squared.      Intake/Output - Last 3 Shifts         10/08 0700  10/09 0659 10/09 0700  10/10 0659 10/10 0700  10/11 0659    P.O. 400 1500 118    I.V. (mL/kg) 2412 (31.8) 275.9 (3.6)     Blood  230     Total Intake(mL/kg) 2812 (37) 2005.9 (26.4) 118 (1.6)    Urine (mL/kg/hr) 900 (0.5) 2600 (1.4)     Stool  0     Total Output 900 2600     Net +1912 -594.1 +118           Stool Occurrence  0 x              Physical Exam  Vitals and nursing note reviewed.   Constitutional:       Appearance: Normal appearance. He is well-developed.   HENT:      Head: Normocephalic and atraumatic.      Nose: Nose normal.      Mouth/Throat:      Mouth: Mucous membranes are dry.      Pharynx: No oropharyngeal exudate or posterior oropharyngeal erythema.      Comments: Lips chapped  Eyes:      General:         Right eye: No discharge.         Left eye: No discharge.      Extraocular Movements: Extraocular movements intact.      Conjunctiva/sclera:  Conjunctivae normal.   Cardiovascular:      Rate and Rhythm: Normal rate and regular rhythm.      Heart sounds: Normal heart sounds. No murmur heard.  Pulmonary:      Effort: Pulmonary effort is normal. No respiratory distress.      Breath sounds: Normal breath sounds. No wheezing or rales.   Abdominal:      General: Bowel sounds are normal. There is no distension.      Palpations: Abdomen is soft.      Tenderness: There is no abdominal tenderness.   Musculoskeletal:         General: No deformity. Normal range of motion.      Cervical back: Normal range of motion and neck supple.      Right lower leg: No edema.      Left lower leg: No edema.   Skin:     General: Skin is warm and dry.      Findings: Bruising present. No erythema or rash.      Comments: Right chest wall vas cath. Dressing c/d/i. No sign of infection to site.   Neurological:      General: No focal deficit present.      Mental Status: He is alert and oriented to person, place, and time.   Psychiatric:         Mood and Affect: Mood normal.         Behavior: Behavior normal.         Thought Content: Thought content normal.         Judgment: Judgment normal.            Significant Labs:   CBC:   Recent Labs   Lab 10/09/24  0357 10/10/24  0324   WBC 0.59* 1.26*   HGB 6.4* 7.6*   HCT 18.6* 21.2*   PLT 18* 13*    and CMP:   Recent Labs   Lab 10/09/24  0357 10/10/24  0324    139   K 4.5 3.9    105   CO2 25 25    105   BUN 7* 9   CREATININE 0.6 0.7   CALCIUM 7.9* 8.3*   PROT 4.9* 5.1*   ALBUMIN 2.6* 2.6*   BILITOT 0.6 0.7   ALKPHOS 49* 52*   AST 16 16   ALT 13 14   ANIONGAP 5* 9       Diagnostic Results:  I have reviewed all pertinent imaging results/findings within the past 24 hours.

## 2024-10-10 NOTE — PLAN OF CARE
Patient A&O x 4. Currently on 2 L NC- maintaining O2 sats of 93% on 2 L.  Resident MD notified about increased O2 requirements.  Patient demonstrated appropriate use of IS.  No signs of fluid overload at this time. Pt up to chair majority of shift with wife at bedside.  Patient reports 1 episode of emesis after meals.  Scheduled zofran and compazine administered. Per NP, ok to give Ativan. Patient reports some relief.  Patient appreciated NP switching oral meds to liquid form.  He was able to take all medication on his treatment regimen.  CHG wipe done.  Central line dressing is clean and dry.  Family at bedside.  No acute issues noted at this time.

## 2024-10-10 NOTE — ASSESSMENT & PLAN NOTE
- reports increased nightly frequency and urgency with low output  - no formal BPH diagnosis; started flomax 9/27  - improved; stopped on 10/10

## 2024-10-10 NOTE — ASSESSMENT & PLAN NOTE
From Dr Hickey's clinic note 8/5:  Stem cell transplant candidate: We discussed the role for allogeneic stem cell transplantation in this disease process as a potentially curative option. We had an extensive discussion about the rationale, logistics, risks, and benefits. We reviewed the requirement to stay in the [place] area for 100 days with a caregiver at all times. We discussed the risks, including infection, graft failure, organ toxicity, graft versus host disease, relapse of disease, and secondary cancers. We reviewed the need for long-term immunosuppression and need for close monitoring. HCT-CI of 1 (intermediate risk). We had a prolonged discussion today regarding his recent deconditioning, Cdiff, and underlying disease. He is now much improved since last seen, and is improving his strength since starting to work with PT. Diarrhea now controlled. We discussed that our plan to move forward with the transplant process.   Coordinator: Fay Stephens  Regimen:  + 2Gy TBI  Donor: son (haplo)   Graft source: BM  - Admitted 9/13/24 for a  TBI PT Cy haplo (son) allogeneic BMT

## 2024-10-10 NOTE — ASSESSMENT & PLAN NOTE
- First fever with tmax 101.8F on 9/23  - Infection w/u unremarkable thus far  - No fevers since 9/23, Cefepime stopped on 9/25 and back on oral ppx LVQ.  - LVQ stopped 10/10 with ANC >1000  RESOLVED

## 2024-10-10 NOTE — ASSESSMENT & PLAN NOTE
- Daily CBC while inpatient  - Transfuse for hgb <7 Plt  or <10K  - Continue acyclovir and posaconazole; levaquin stopped 10/10 with ANC >1000  - Holding cilostazol for plts < 50K  ANC 1147 today

## 2024-10-11 LAB
ABO + RH BLD: NORMAL
ALBUMIN SERPL BCP-MCNC: 2.7 G/DL (ref 3.5–5.2)
ALP SERPL-CCNC: 55 U/L (ref 55–135)
ALT SERPL W/O P-5'-P-CCNC: 11 U/L (ref 10–44)
ANION GAP SERPL CALC-SCNC: 7 MMOL/L (ref 8–16)
ANISOCYTOSIS BLD QL SMEAR: SLIGHT
AST SERPL-CCNC: 14 U/L (ref 10–40)
BASOPHILS NFR BLD: 0 % (ref 0–1.9)
BILIRUB SERPL-MCNC: 0.6 MG/DL (ref 0.1–1)
BLD GP AB SCN CELLS X3 SERPL QL: NORMAL
BLD PROD TYP BPU: NORMAL
BLOOD UNIT EXPIRATION DATE: NORMAL
BLOOD UNIT TYPE CODE: 8400
BLOOD UNIT TYPE: NORMAL
BUN SERPL-MCNC: 9 MG/DL (ref 8–23)
CALCIUM SERPL-MCNC: 8.4 MG/DL (ref 8.7–10.5)
CHLORIDE SERPL-SCNC: 106 MMOL/L (ref 95–110)
CO2 SERPL-SCNC: 25 MMOL/L (ref 23–29)
CODING SYSTEM: NORMAL
CREAT SERPL-MCNC: 0.7 MG/DL (ref 0.5–1.4)
CROSSMATCH INTERPRETATION: NORMAL
DIFFERENTIAL METHOD BLD: ABNORMAL
DISPENSE STATUS: NORMAL
DOHLE BOD BLD QL SMEAR: PRESENT
EOSINOPHIL NFR BLD: 0 % (ref 0–8)
ERYTHROCYTE [DISTWIDTH] IN BLOOD BY AUTOMATED COUNT: 13 % (ref 11.5–14.5)
EST. GFR  (NO RACE VARIABLE): >60 ML/MIN/1.73 M^2
GLUCOSE SERPL-MCNC: 114 MG/DL (ref 70–110)
HCT VFR BLD AUTO: 20.6 % (ref 40–54)
HGB BLD-MCNC: 7.2 G/DL (ref 14–18)
IMM GRANULOCYTES # BLD AUTO: ABNORMAL K/UL (ref 0–0.04)
IMM GRANULOCYTES NFR BLD AUTO: ABNORMAL % (ref 0–0.5)
LYMPHOCYTES NFR BLD: 1 % (ref 18–48)
MAGNESIUM SERPL-MCNC: 1.7 MG/DL (ref 1.6–2.6)
MCH RBC QN AUTO: 30.1 PG (ref 27–31)
MCHC RBC AUTO-ENTMCNC: 35 G/DL (ref 32–36)
MCV RBC AUTO: 86 FL (ref 82–98)
METAMYELOCYTES NFR BLD MANUAL: 1 %
MONOCYTES NFR BLD: 20 % (ref 4–15)
NEUTROPHILS NFR BLD: 76 % (ref 38–73)
NEUTS BAND NFR BLD MANUAL: 2 %
NRBC BLD-RTO: 0 /100 WBC
OVALOCYTES BLD QL SMEAR: ABNORMAL
PHOSPHATE SERPL-MCNC: 3.4 MG/DL (ref 2.7–4.5)
PLATELET # BLD AUTO: 6 K/UL (ref 150–450)
PLATELET BLD QL SMEAR: ABNORMAL
PMV BLD AUTO: 13.3 FL (ref 9.2–12.9)
POIKILOCYTOSIS BLD QL SMEAR: SLIGHT
POTASSIUM SERPL-SCNC: 3.5 MMOL/L (ref 3.5–5.1)
PROT SERPL-MCNC: 5.1 G/DL (ref 6–8.4)
RBC # BLD AUTO: 2.39 M/UL (ref 4.6–6.2)
SODIUM SERPL-SCNC: 138 MMOL/L (ref 136–145)
SPECIMEN OUTDATE: NORMAL
TACROLIMUS BLD-MCNC: 8.3 NG/ML (ref 5–15)
TOXIC GRANULES BLD QL SMEAR: PRESENT
UNIT NUMBER: NORMAL
WBC # BLD AUTO: 1.49 K/UL (ref 3.9–12.7)

## 2024-10-11 PROCEDURE — 25000003 PHARM REV CODE 250: Performed by: HOSPITALIST

## 2024-10-11 PROCEDURE — 84100 ASSAY OF PHOSPHORUS: CPT | Performed by: NURSE PRACTITIONER

## 2024-10-11 PROCEDURE — P9073 PLATELETS PHERESIS PATH REDU: HCPCS

## 2024-10-11 PROCEDURE — 86901 BLOOD TYPING SEROLOGIC RH(D): CPT | Performed by: INTERNAL MEDICINE

## 2024-10-11 PROCEDURE — 25000003 PHARM REV CODE 250: Performed by: INTERNAL MEDICINE

## 2024-10-11 PROCEDURE — 85007 BL SMEAR W/DIFF WBC COUNT: CPT | Performed by: NURSE PRACTITIONER

## 2024-10-11 PROCEDURE — 25000003 PHARM REV CODE 250: Performed by: STUDENT IN AN ORGANIZED HEALTH CARE EDUCATION/TRAINING PROGRAM

## 2024-10-11 PROCEDURE — 63600175 PHARM REV CODE 636 W HCPCS: Performed by: NURSE PRACTITIONER

## 2024-10-11 PROCEDURE — 25000003 PHARM REV CODE 250: Performed by: NURSE PRACTITIONER

## 2024-10-11 PROCEDURE — 86900 BLOOD TYPING SEROLOGIC ABO: CPT | Performed by: INTERNAL MEDICINE

## 2024-10-11 PROCEDURE — 80053 COMPREHEN METABOLIC PANEL: CPT | Performed by: NURSE PRACTITIONER

## 2024-10-11 PROCEDURE — 63600175 PHARM REV CODE 636 W HCPCS: Performed by: STUDENT IN AN ORGANIZED HEALTH CARE EDUCATION/TRAINING PROGRAM

## 2024-10-11 PROCEDURE — P9073 PLATELETS PHERESIS PATH REDU: HCPCS | Mod: 91

## 2024-10-11 PROCEDURE — 36430 TRANSFUSION BLD/BLD COMPNT: CPT

## 2024-10-11 PROCEDURE — 20600001 HC STEP DOWN PRIVATE ROOM

## 2024-10-11 PROCEDURE — 85027 COMPLETE CBC AUTOMATED: CPT | Performed by: NURSE PRACTITIONER

## 2024-10-11 PROCEDURE — 63600175 PHARM REV CODE 636 W HCPCS: Mod: JZ,JG | Performed by: INTERNAL MEDICINE

## 2024-10-11 PROCEDURE — 80197 ASSAY OF TACROLIMUS: CPT | Performed by: INTERNAL MEDICINE

## 2024-10-11 PROCEDURE — 83735 ASSAY OF MAGNESIUM: CPT | Performed by: NURSE PRACTITIONER

## 2024-10-11 RX ORDER — CHOLESTYRAMINE 4 G/9G
1 POWDER, FOR SUSPENSION ORAL 2 TIMES DAILY
Status: DISCONTINUED | OUTPATIENT
Start: 2024-10-11 | End: 2024-10-15

## 2024-10-11 RX ORDER — FUROSEMIDE 10 MG/ML
20 INJECTION INTRAMUSCULAR; INTRAVENOUS ONCE
Status: COMPLETED | OUTPATIENT
Start: 2024-10-11 | End: 2024-10-11

## 2024-10-11 RX ADMIN — MYCOPHENOLATE MOFETIL 1000 MG: 200 POWDER, FOR SUSPENSION ORAL at 09:10

## 2024-10-11 RX ADMIN — CARVEDILOL 6.25 MG: 6.25 TABLET, FILM COATED ORAL at 09:10

## 2024-10-11 RX ADMIN — PROCHLORPERAZINE EDISYLATE 5 MG: 5 INJECTION INTRAMUSCULAR; INTRAVENOUS at 06:10

## 2024-10-11 RX ADMIN — ONDANSETRON 8 MG: 8 TABLET, ORALLY DISINTEGRATING ORAL at 10:10

## 2024-10-11 RX ADMIN — Medication 1 DOSE: at 01:10

## 2024-10-11 RX ADMIN — PROCHLORPERAZINE EDISYLATE 5 MG: 5 INJECTION INTRAMUSCULAR; INTRAVENOUS at 05:10

## 2024-10-11 RX ADMIN — TACROLIMUS 1 MG: 1 CAPSULE ORAL at 05:10

## 2024-10-11 RX ADMIN — GABAPENTIN 600 MG: 250 SOLUTION ORAL at 10:10

## 2024-10-11 RX ADMIN — LOPERAMIDE HYDROCHLORIDE 2 MG: 2 CAPSULE ORAL at 05:10

## 2024-10-11 RX ADMIN — URSODIOL 300 MG: 300 CAPSULE ORAL at 08:10

## 2024-10-11 RX ADMIN — ONDANSETRON 8 MG: 8 TABLET, ORALLY DISINTEGRATING ORAL at 06:10

## 2024-10-11 RX ADMIN — URSODIOL 300 MG: 300 CAPSULE ORAL at 11:10

## 2024-10-11 RX ADMIN — VANCOMYCIN HYDROCHLORIDE 125 MG: KIT at 09:10

## 2024-10-11 RX ADMIN — ONDANSETRON 8 MG: 8 TABLET, ORALLY DISINTEGRATING ORAL at 01:10

## 2024-10-11 RX ADMIN — ACYCLOVIR 800 MG: 200 SUSPENSION ORAL at 09:10

## 2024-10-11 RX ADMIN — TRAZODONE HYDROCHLORIDE 50 MG: 50 TABLET ORAL at 08:10

## 2024-10-11 RX ADMIN — PANTOPRAZOLE SODIUM 40 MG: 40 TABLET, DELAYED RELEASE ORAL at 09:10

## 2024-10-11 RX ADMIN — PROCHLORPERAZINE EDISYLATE 5 MG: 5 INJECTION INTRAMUSCULAR; INTRAVENOUS at 12:10

## 2024-10-11 RX ADMIN — Medication: at 08:10

## 2024-10-11 RX ADMIN — Medication 1 DOSE: at 04:10

## 2024-10-11 RX ADMIN — LIDOCAINE 5% 1 PATCH: 700 PATCH TOPICAL at 09:10

## 2024-10-11 RX ADMIN — OLANZAPINE 5 MG: 2.5 TABLET, FILM COATED ORAL at 08:10

## 2024-10-11 RX ADMIN — FILGRASTIM 300 MCG: 300 INJECTION, SOLUTION INTRAVENOUS; SUBCUTANEOUS at 10:10

## 2024-10-11 RX ADMIN — PROCHLORPERAZINE EDISYLATE 5 MG: 5 INJECTION INTRAMUSCULAR; INTRAVENOUS at 01:10

## 2024-10-11 RX ADMIN — FUROSEMIDE 20 MG: 10 INJECTION, SOLUTION INTRAMUSCULAR; INTRAVENOUS at 01:10

## 2024-10-11 RX ADMIN — MYCOPHENOLATE MOFETIL 1000 MG: 200 POWDER, FOR SUSPENSION ORAL at 04:10

## 2024-10-11 RX ADMIN — GABAPENTIN 600 MG: 250 SOLUTION ORAL at 08:10

## 2024-10-11 RX ADMIN — Medication 1 DOSE: at 08:10

## 2024-10-11 RX ADMIN — POTASSIUM BICARBONATE 40 MEQ: 391 TABLET, EFFERVESCENT ORAL at 07:10

## 2024-10-11 RX ADMIN — MYCOPHENOLATE MOFETIL 1000 MG: 200 POWDER, FOR SUSPENSION ORAL at 08:10

## 2024-10-11 RX ADMIN — CHOLESTYRAMINE 4 G: 4 POWDER, FOR SUSPENSION ORAL at 05:10

## 2024-10-11 RX ADMIN — DIPHENHYDRAMINE HYDROCHLORIDE 50 MG: 25 CAPSULE ORAL at 11:10

## 2024-10-11 RX ADMIN — LETERMOVIR 480 MG: 480 TABLET, FILM COATED ORAL at 09:10

## 2024-10-11 RX ADMIN — HYDROCORTISONE: 25 CREAM TOPICAL at 10:10

## 2024-10-11 RX ADMIN — CARVEDILOL 6.25 MG: 6.25 TABLET, FILM COATED ORAL at 08:10

## 2024-10-11 RX ADMIN — Medication: at 09:10

## 2024-10-11 RX ADMIN — Medication 1 DOSE: at 09:10

## 2024-10-11 RX ADMIN — ACYCLOVIR 800 MG: 200 SUSPENSION ORAL at 08:10

## 2024-10-11 RX ADMIN — HYDROCORTISONE: 25 CREAM TOPICAL at 08:10

## 2024-10-11 RX ADMIN — TACROLIMUS 0.5 MG: 0.5 CAPSULE ORAL at 09:10

## 2024-10-11 RX ADMIN — POSACONAZOLE 300 MG: 100 TABLET, DELAYED RELEASE ORAL at 09:10

## 2024-10-11 RX ADMIN — VANCOMYCIN HYDROCHLORIDE 125 MG: KIT at 08:10

## 2024-10-11 NOTE — PLAN OF CARE
Pt is day +22 FluMel Haplo SCT. No acute events occurred during the shift. No PRNs needed. No complains of pain or nausea. Pt is up with assist with walker. No difficulty voiding.  Pt is on 2L of oxygen and stats between 92-94%. VS have been stable throughout shift and pt is afebrile. Pt AOx4. POC reviewed with patient and his wife. Both verbalized an understanding. Questions encouraged and answered. Bed is in the lowest position and locked. Non skid socks worn. Call light within reach. Pt encouraged to call for assistance when needed.

## 2024-10-11 NOTE — PLAN OF CARE
Side rails up x2; call bell in place; bed in lowest, locked position; skid proof socks on; no evidence of skin breakdown; care plan explained to patient; pt remains free of injury. The pt is day +22 of an Haplo SCT. Pt tolerated a small amount of po, voids, diarrhea x 6, imodium and questran given. Ambulates with walker and standby assist. Bed alarm applied and pt and wife instructed to call for standby assist. Wife verbalized understanding. Platelets transfused without incident. Pt with c/o nausea, compazine and zofran given ATC. O2 weaned to RA pt was 87%. NC reapplied at 1 L, O2 sats were 95%. Frequent rounding in progress pt encouraged to call as needed, VSS and afebrile.

## 2024-10-11 NOTE — SUBJECTIVE & OBJECTIVE
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 Day 3 of engraftment today with an ANC of 1162. He is Afebrile, VSS. Denies any nausea and vomiting. Complains of diarrhea abt 4x this morning. He is off oxygen. CXR shows some pulmonary edema. Lasix 20mg ordered. Continue to encourage ambulation. Receiving 1 unit of platelets.  Objective:     Vital Signs (Most Recent):  Temp: 98.3 °F (36.8 °C) (10/11/24 1158)  Pulse: 84 (10/11/24 1158)  Resp: 16 (10/11/24 1158)  BP: 137/71 (10/11/24 1158)  SpO2: 95 % (10/11/24 1158) Vital Signs (24h Range):  Temp:  [98.3 °F (36.8 °C)-99.3 °F (37.4 °C)] 98.3 °F (36.8 °C)  Pulse:  [80-95] 84  Resp:  [16-22] 16  SpO2:  [88 %-95 %] 95 %  BP: (108-150)/(57-75) 137/71     Weight: 76 kg (167 lb 7 oz)  Body mass index is 24.73 kg/m².  Body surface area is 1.92 meters squared.    ECOG SCORE           [unfilled]    Intake/Output - Last 3 Shifts         10/09 0700  10/10 0659 10/10 0700  10/11 0659 10/11 0700  10/12 0659    P.O. 1500 268     I.V. (mL/kg) 275.9 (3.6) 99.2 (1.3)     Blood 230  645.6    Total Intake(mL/kg) 2005.9 (26.4) 367.2 (4.8) 645.6 (8.5)    Urine (mL/kg/hr) 2600 (1.4) 1300 (0.7)     Emesis/NG output  0     Stool 0 0     Total Output 2600 1300     Net -594.1 -932.8 +645.6           Stool Occurrence 0 x 1 x 1 x    Emesis Occurrence  1 x              Physical Exam  Vitals and nursing note reviewed.   Constitutional:       Appearance: Normal appearance. He is well-developed.   HENT:      Head: Normocephalic and atraumatic.      Nose: Nose normal.      Mouth/Throat:      Mouth: Mucous membranes are dry.      Pharynx: No oropharyngeal exudate or posterior oropharyngeal erythema.      Comments: Lips chapped  Eyes:      General:         Right eye: No discharge.         Left eye: No discharge.      Extraocular Movements: Extraocular movements intact.      Conjunctiva/sclera: Conjunctivae normal.   Cardiovascular:      Rate and  Rhythm: Normal rate and regular rhythm.      Heart sounds: Normal heart sounds. No murmur heard.  Pulmonary:      Effort: Pulmonary effort is normal. No respiratory distress.      Breath sounds: Normal breath sounds. No wheezing or rales.   Abdominal:      General: Bowel sounds are normal. There is no distension.      Palpations: Abdomen is soft.      Tenderness: There is no abdominal tenderness.   Musculoskeletal:         General: No deformity. Normal range of motion.      Cervical back: Normal range of motion and neck supple.      Right lower leg: No edema.      Left lower leg: No edema.   Skin:     General: Skin is warm and dry.      Findings: Bruising present. No erythema or rash.      Comments: Right chest wall vas cath. Dressing c/d/i. No sign of infection to site.   Neurological:      General: No focal deficit present.      Mental Status: He is alert and oriented to person, place, and time.   Psychiatric:         Mood and Affect: Mood normal.         Behavior: Behavior normal.         Thought Content: Thought content normal.         Judgment: Judgment normal.            Significant Labs:   All pertinent labs from the last 24 hours have been reviewed.    Diagnostic Results:  I have reviewed and interpreted all pertinent imaging results/findings within the past 24 hours.

## 2024-10-11 NOTE — PROGRESS NOTES
The  visited the patient at bedside with his spouse present. The patient's spouse stated that she had not used the refreshed meal card, but she will use today or tomorrow. The patient nor his spouse had no other social work needs at this time.

## 2024-10-11 NOTE — ASSESSMENT & PLAN NOTE
- Patient of Dr. 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 Today is Day +22  Engrafted 10/9 with ANC of 531  - ANC today 1162  - Tacro level 8.3 on 10/11; continue dose of 0.5mg AM and 1mg PM.   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 None noted thus far.  - Patient is B+. Donor is A+.  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 Patient will start letermovir ppx on 9/24/24.  - Received fresh BMT product on 9/19/24 with a CD34 dose of 3.55 x10^6.  - Of note, cilostazol may interact with tacro. (Currently on hold for plts < 50K).  - See treatment plan below:    Planned conditioning regimen:  Fludarabine on Day -5, -4, -3, and -2  Melphalan on Day -2  TBI on Day -1     Planned  GVHD Prophylaxis:  Cyclophosphamide (with Mesna) on Days +3 and +4  Mycophenolate starting on Day +5  Tacrolimus starting on Day +5     Antimicrobial Prophylaxis:  Acyclovir starting on Day -5  Levofloxacin starting on Day -1  Micafungin on Day -1 through Day +4  Letermovir starting on Day +5 (if CMV PCR drawn on day 0 results negative)  Posaconazole starting on Day +5  Bactrim starting on Day +30     SOS/VOD Prophylaxis:  Ursodiol on Day -5 through Day +30      Growth Factor Support:  Neupogen starting on Day +5     Caregiver: wife   Post-transplant discharge plans: home

## 2024-10-11 NOTE — PROGRESS NOTES
Janusz Ma - Oncology ([facility])  Hematology  Bone Marrow Transplant  Progress Note    Patient Name: Guillaume Salinas  Admission Date: 9/13/2024  Hospital Length of Stay: 28 days  Code Status: Full Code    Subjective:     Interval History:  Day +22 s/p  + TBI + PT Cy Haploidentical (son) BMT for MDS. Day 3 of engraftment today with an ANC of 1162. He is Afebrile, VSS. Denies any nausea and vomiting. Complains of diarrhea abt 4x this morning. He is off oxygen. CXR shows some pulmonary edema. Lasix 20mg ordered. Continue to encourage ambulation. Receiving 1 unit of platelets.  Objective:     Vital Signs (Most Recent):  Temp: 98.3 °F (36.8 °C) (10/11/24 1158)  Pulse: 84 (10/11/24 1158)  Resp: 16 (10/11/24 1158)  BP: 137/71 (10/11/24 1158)  SpO2: 95 % (10/11/24 1158) Vital Signs (24h Range):  Temp:  [98.3 °F (36.8 °C)-99.3 °F (37.4 °C)] 98.3 °F (36.8 °C)  Pulse:  [80-95] 84  Resp:  [16-22] 16  SpO2:  [88 %-95 %] 95 %  BP: (108-150)/(57-75) 137/71     Weight: 76 kg (167 lb 7 oz)  Body mass index is 24.73 kg/m².  Body surface area is 1.92 meters squared.    ECOG SCORE           [unfilled]    Intake/Output - Last 3 Shifts         10/09 0700  10/10 0659 10/10 0700  10/11 0659 10/11 0700  10/12 0659    P.O. 1500 268     I.V. (mL/kg) 275.9 (3.6) 99.2 (1.3)     Blood 230  645.6    Total Intake(mL/kg) 2005.9 (26.4) 367.2 (4.8) 645.6 (8.5)    Urine (mL/kg/hr) 2600 (1.4) 1300 (0.7)     Emesis/NG output  0     Stool 0 0     Total Output 2600 1300     Net -594.1 -932.8 +645.6           Stool Occurrence 0 x 1 x 1 x    Emesis Occurrence  1 x              Physical Exam  Vitals and nursing note reviewed.   Constitutional:       Appearance: Normal appearance. He is well-developed.   HENT:      Head: Normocephalic and atraumatic.      Nose: Nose normal.      Mouth/Throat:      Mouth: Mucous membranes are dry.      Pharynx: No oropharyngeal exudate or posterior oropharyngeal erythema.      Comments: Lips chapped  Eyes:       General:         Right eye: No discharge.         Left eye: No discharge.      Extraocular Movements: Extraocular movements intact.      Conjunctiva/sclera: Conjunctivae normal.   Cardiovascular:      Rate and Rhythm: Normal rate and regular rhythm.      Heart sounds: Normal heart sounds. No murmur heard.  Pulmonary:      Effort: Pulmonary effort is normal. No respiratory distress.      Breath sounds: Normal breath sounds. No wheezing or rales.   Abdominal:      General: Bowel sounds are normal. There is no distension.      Palpations: Abdomen is soft.      Tenderness: There is no abdominal tenderness.   Musculoskeletal:         General: No deformity. Normal range of motion.      Cervical back: Normal range of motion and neck supple.      Right lower leg: No edema.      Left lower leg: No edema.   Skin:     General: Skin is warm and dry.      Findings: Bruising present. No erythema or rash.      Comments: Right chest wall vas cath. Dressing c/d/i. No sign of infection to site.   Neurological:      General: No focal deficit present.      Mental Status: He is alert and oriented to person, place, and time.   Psychiatric:         Mood and Affect: Mood normal.         Behavior: Behavior normal.         Thought Content: Thought content normal.         Judgment: Judgment normal.            Significant Labs:   All pertinent labs from the last 24 hours have been reviewed.    Diagnostic Results:  I have reviewed and interpreted all pertinent imaging results/findings within the past 24 hours.  Assessment/Plan:     * S/P allogeneic bone marrow transplant  - Patient of Dr. 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 Today is Day +22  Engrafted 10/9 with ANC of 531  - ANC today 1162  - Tacro level 8.3 on 10/11; continue dose of 0.5mg AM and 1mg PM.   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 None noted thus far.  - Patient is B+. Donor is A+.  - Patient is CMV reactive.  Donor is CMV reactive. Patient will start letermovir ppx on 9/24/24.  - Received fresh BMT product on 9/19/24 with a CD34 dose of 3.55 x10^6.  - Of note, cilostazol may interact with tacro. (Currently on hold for plts < 50K).  - See treatment plan below:    Planned conditioning regimen:  Fludarabine on Day -5, -4, -3, and -2  Melphalan on Day -2  TBI on Day -1     Planned  GVHD Prophylaxis:  Cyclophosphamide (with Mesna) on Days +3 and +4  Mycophenolate starting on Day +5  Tacrolimus starting on Day +5     Antimicrobial Prophylaxis:  Acyclovir starting on Day -5  Levofloxacin starting on Day -1  Micafungin on Day -1 through Day +4  Letermovir starting on Day +5 (if CMV PCR drawn on day 0 results negative)  Posaconazole starting on Day +5  Bactrim starting on Day +30     SOS/VOD Prophylaxis:  Ursodiol on Day -5 through Day +30      Growth Factor Support:  Neupogen starting on Day +5     Caregiver: wife   Post-transplant discharge plans: home    Hypomagnesemia  - See electrolyte disorder  - replace PRN  - Tacro likely contributing  - will need scheduled magnesium on discharge (currently on 800mg BID)    Electrolyte disorder  - Replete per PRN electrolyte order set  - Daily CMP, mag, and phos while inpatient    Moderate malnutrition  Nutrition consulted. Most recent weight and BMI monitored-     Measurements:  Wt Readings from Last 1 Encounters:   10/08/24 76 kg (167 lb 7 oz)   Body mass index is 24.73 kg/m².    Patient has been screened and assessed by RD.    - Switched boost plus to boost breeze as diary of plus making patient nauseous  - PO intake as tolerated; on scheduled antiemetics for nausea  - stopped IVF with engraftment; encouraged increased PO intake in order to DC    Dry mouth  - continue to moisten lips with chapstick  - encouraged use of oral hygiene rinses to sterilize mouth and stimulate saliva production  - consider speech consult on 10/11 if patient still reporting difficulties swallowing    Urinary  frequency  - reports increased nightly frequency and urgency with low output  - no formal BPH diagnosis; started flomax 9/27  - improved; stopped on 10/10    Chemotherapy-induced nausea  - scheduled zofran IV 8mg q8h on 9/27  - added scheduled compazine 9/30  - added 5mg zyprexa at night 10/8  - has prn ativan  - antiemetics now PO      Hemorrhoids  - d/t chemo induced diarrhea  - has anusol, tucks pads, and LETS gel  - patient denies any bleeding at site  - has PRN morphine for pain control  - hemorrhoid pain improving with diarrhea control    Headache  - C/o headache 9/25  - Daily coffee drinker. Improved after drinking coffee.  - Suspect caffeine withdrawal.  - Can start caffeine tablet if patient is unable to drink coffee due to chemo side effects  - none today    Chemotherapy induced diarrhea  - C-diff neg 9/24  - Of note, was treated empirically for c-diff with oral vanc prior to admission and is receiving oral vanc ppx while admitted.  - Receiving scheduled imodium and PRN lomotil  - Started Questran 10/1.  - Diarrhea improved, so changed imodium from scheduled to PRN    Neutropenic fever  - First fever with tmax 101.8F on 9/23  - Infection w/u unremarkable thus far  - No fevers since 9/23, Cefepime stopped on 9/25 and back on oral ppx LVQ.  - LVQ stopped 10/10 with ANC >1000  RESOLVED    Insomnia  - states melatonin does not work  - started on scheduled trazodone 10/8    History of Clostridioides difficile infection  - Was treated empirically for c-diff with oral vanc prior to admission.  - Continue oral vanc ppx per transplant protocol      Neuropathy  - Continue home gabapentin    Pancytopenia due to antineoplastic chemotherapy  - Daily CBC while inpatient  - Transfuse for hgb <7 Plt  or <10K  - Continue acyclovir and posaconazole; levaquin stopped 10/10 with ANC >1000  - Holding cilostazol for plts < 50K  ANC 1147 today    Myelodysplastic syndrome  From Dr Hickey's clinic note 8/5:  Stem cell transplant  candidate: We discussed the role for allogeneic stem cell transplantation in this disease process as a potentially curative option. We had an extensive discussion about the rationale, logistics, risks, and benefits. We reviewed the requirement to stay in the [place] area for 100 days with a caregiver at all times. We discussed the risks, including infection, graft failure, organ toxicity, graft versus host disease, relapse of disease, and secondary cancers. We reviewed the need for long-term immunosuppression and need for close monitoring. HCT-CI of 1 (intermediate risk). We had a prolonged discussion today regarding his recent deconditioning, Cdiff, and underlying disease. He is now much improved since last seen, and is improving his strength since starting to work with PT. Diarrhea now controlled. We discussed that our plan to move forward with the transplant process.   Coordinator: Fay Stephens  Regimen:  + 2Gy TBI  Donor: son (haplo)   Graft source: BM  - Admitted 9/13/24 for a  TBI PT Cy haplo (son) allogeneic BMT    Hypertension, essential  - Was holding home Coreg for fluid responsive hypotension. Resumed 9/30.    Coronary artery disease involving native coronary artery of native heart without angina pectoris  - Was holding home Coreg for hypotension. Resumed 9/30.    Physical debility  - PT/OT following while inpatient  - recommending HH with rolling walker  - The mobility limitation cannot be sufficiently resolved by the use of a cane. Patient's functional mobility deficit can be sufficiently resolved with the use of a Rolling Walker. Patient's mobility limitation significantly impairs their ability to participate in one of more activities of daily living.  The use of a Rolling Walker, Walker will significantly improve the patient's ability to participate in MRADLS and the patient will use it on regular basis in the home.        VTE Risk Mitigation (From admission, onward)           Ordered      heparin, porcine (PF) 100 unit/mL injection flush 300 Units  As needed (PRN)         09/13/24 4270                    Disposition: Home    Celso Baig MD  Bone Marrow Transplant  [facility] - Oncology ([facility])

## 2024-10-12 LAB
ALBUMIN SERPL BCP-MCNC: 2.8 G/DL (ref 3.5–5.2)
ALP SERPL-CCNC: 55 U/L (ref 55–135)
ALT SERPL W/O P-5'-P-CCNC: 14 U/L (ref 10–44)
ANION GAP SERPL CALC-SCNC: 5 MMOL/L (ref 8–16)
ANISOCYTOSIS BLD QL SMEAR: SLIGHT
AST SERPL-CCNC: 15 U/L (ref 10–40)
BASOPHILS # BLD AUTO: ABNORMAL K/UL (ref 0–0.2)
BASOPHILS NFR BLD: 1 % (ref 0–1.9)
BILIRUB SERPL-MCNC: 0.5 MG/DL (ref 0.1–1)
BUN SERPL-MCNC: 10 MG/DL (ref 8–23)
CALCIUM SERPL-MCNC: 8.7 MG/DL (ref 8.7–10.5)
CHLORIDE SERPL-SCNC: 104 MMOL/L (ref 95–110)
CO2 SERPL-SCNC: 30 MMOL/L (ref 23–29)
CREAT SERPL-MCNC: 0.7 MG/DL (ref 0.5–1.4)
DIFFERENTIAL METHOD BLD: ABNORMAL
DOHLE BOD BLD QL SMEAR: PRESENT
EOSINOPHIL # BLD AUTO: ABNORMAL K/UL (ref 0–0.5)
EOSINOPHIL NFR BLD: 0 % (ref 0–8)
ERYTHROCYTE [DISTWIDTH] IN BLOOD BY AUTOMATED COUNT: 13.1 % (ref 11.5–14.5)
EST. GFR  (NO RACE VARIABLE): >60 ML/MIN/1.73 M^2
GLUCOSE SERPL-MCNC: 125 MG/DL (ref 70–110)
HCT VFR BLD AUTO: 21.1 % (ref 40–54)
HGB BLD-MCNC: 7.2 G/DL (ref 14–18)
IMM GRANULOCYTES # BLD AUTO: ABNORMAL K/UL (ref 0–0.04)
IMM GRANULOCYTES NFR BLD AUTO: ABNORMAL % (ref 0–0.5)
LYMPHOCYTES # BLD AUTO: ABNORMAL K/UL (ref 1–4.8)
LYMPHOCYTES NFR BLD: 1 % (ref 18–48)
MAGNESIUM SERPL-MCNC: 1.4 MG/DL (ref 1.6–2.6)
MCH RBC QN AUTO: 29.8 PG (ref 27–31)
MCHC RBC AUTO-ENTMCNC: 34.1 G/DL (ref 32–36)
MCV RBC AUTO: 87 FL (ref 82–98)
METAMYELOCYTES NFR BLD MANUAL: 4 %
MONOCYTES # BLD AUTO: ABNORMAL K/UL (ref 0.3–1)
MONOCYTES NFR BLD: 20 % (ref 4–15)
MYELOCYTES NFR BLD MANUAL: 2 %
NEUTROPHILS NFR BLD: 68 % (ref 38–73)
NEUTS BAND NFR BLD MANUAL: 4 %
NRBC BLD-RTO: 0 /100 WBC
PHOSPHATE SERPL-MCNC: 3.5 MG/DL (ref 2.7–4.5)
PLATELET # BLD AUTO: 22 K/UL (ref 150–450)
PLATELET BLD QL SMEAR: ABNORMAL
PMV BLD AUTO: 11 FL (ref 9.2–12.9)
POIKILOCYTOSIS BLD QL SMEAR: SLIGHT
POTASSIUM SERPL-SCNC: 3.6 MMOL/L (ref 3.5–5.1)
PROT SERPL-MCNC: 5.2 G/DL (ref 6–8.4)
RBC # BLD AUTO: 2.42 M/UL (ref 4.6–6.2)
SCHISTOCYTES BLD QL SMEAR: ABNORMAL
SCHISTOCYTES BLD QL SMEAR: PRESENT
SODIUM SERPL-SCNC: 139 MMOL/L (ref 136–145)
SPHEROCYTES BLD QL SMEAR: ABNORMAL
TOXIC GRANULES BLD QL SMEAR: PRESENT
WBC # BLD AUTO: 2.42 K/UL (ref 3.9–12.7)
WBC TOXIC VACUOLES BLD QL SMEAR: PRESENT

## 2024-10-12 PROCEDURE — 85007 BL SMEAR W/DIFF WBC COUNT: CPT | Performed by: NURSE PRACTITIONER

## 2024-10-12 PROCEDURE — 25000003 PHARM REV CODE 250: Performed by: INTERNAL MEDICINE

## 2024-10-12 PROCEDURE — 20600001 HC STEP DOWN PRIVATE ROOM

## 2024-10-12 PROCEDURE — 84100 ASSAY OF PHOSPHORUS: CPT | Performed by: NURSE PRACTITIONER

## 2024-10-12 PROCEDURE — 80053 COMPREHEN METABOLIC PANEL: CPT | Performed by: NURSE PRACTITIONER

## 2024-10-12 PROCEDURE — 25000003 PHARM REV CODE 250: Performed by: NURSE PRACTITIONER

## 2024-10-12 PROCEDURE — 25000003 PHARM REV CODE 250: Performed by: STUDENT IN AN ORGANIZED HEALTH CARE EDUCATION/TRAINING PROGRAM

## 2024-10-12 PROCEDURE — 63600175 PHARM REV CODE 636 W HCPCS: Performed by: STUDENT IN AN ORGANIZED HEALTH CARE EDUCATION/TRAINING PROGRAM

## 2024-10-12 PROCEDURE — 63600175 PHARM REV CODE 636 W HCPCS: Performed by: NURSE PRACTITIONER

## 2024-10-12 PROCEDURE — 63600175 PHARM REV CODE 636 W HCPCS: Mod: JZ,JG | Performed by: INTERNAL MEDICINE

## 2024-10-12 PROCEDURE — 83735 ASSAY OF MAGNESIUM: CPT | Performed by: NURSE PRACTITIONER

## 2024-10-12 PROCEDURE — 85027 COMPLETE CBC AUTOMATED: CPT | Performed by: NURSE PRACTITIONER

## 2024-10-12 RX ORDER — MAGNESIUM SULFATE HEPTAHYDRATE 40 MG/ML
2 INJECTION, SOLUTION INTRAVENOUS ONCE
Status: COMPLETED | OUTPATIENT
Start: 2024-10-12 | End: 2024-10-12

## 2024-10-12 RX ORDER — POTASSIUM CHLORIDE 7.45 MG/ML
10 INJECTION INTRAVENOUS
Status: COMPLETED | OUTPATIENT
Start: 2024-10-12 | End: 2024-10-12

## 2024-10-12 RX ADMIN — MYCOPHENOLATE MOFETIL 1000 MG: 200 POWDER, FOR SUSPENSION ORAL at 09:10

## 2024-10-12 RX ADMIN — CHOLESTYRAMINE 4 G: 4 POWDER, FOR SUSPENSION ORAL at 09:10

## 2024-10-12 RX ADMIN — PROCHLORPERAZINE EDISYLATE 5 MG: 5 INJECTION INTRAMUSCULAR; INTRAVENOUS at 06:10

## 2024-10-12 RX ADMIN — PROCHLORPERAZINE EDISYLATE 5 MG: 5 INJECTION INTRAMUSCULAR; INTRAVENOUS at 05:10

## 2024-10-12 RX ADMIN — PROCHLORPERAZINE EDISYLATE 5 MG: 5 INJECTION INTRAMUSCULAR; INTRAVENOUS at 12:10

## 2024-10-12 RX ADMIN — URSODIOL 300 MG: 300 CAPSULE ORAL at 09:10

## 2024-10-12 RX ADMIN — VANCOMYCIN HYDROCHLORIDE 125 MG: KIT at 09:10

## 2024-10-12 RX ADMIN — ACYCLOVIR 800 MG: 200 SUSPENSION ORAL at 09:10

## 2024-10-12 RX ADMIN — LIDOCAINE 5% 1 PATCH: 700 PATCH TOPICAL at 09:10

## 2024-10-12 RX ADMIN — HYDROCORTISONE: 25 CREAM TOPICAL at 09:10

## 2024-10-12 RX ADMIN — POSACONAZOLE 300 MG: 100 TABLET, DELAYED RELEASE ORAL at 09:10

## 2024-10-12 RX ADMIN — ONDANSETRON 8 MG: 8 TABLET, ORALLY DISINTEGRATING ORAL at 02:10

## 2024-10-12 RX ADMIN — Medication 1 DOSE: at 09:10

## 2024-10-12 RX ADMIN — FILGRASTIM 300 MCG: 300 INJECTION, SOLUTION INTRAVENOUS; SUBCUTANEOUS at 09:10

## 2024-10-12 RX ADMIN — Medication 1 DOSE: at 12:10

## 2024-10-12 RX ADMIN — POTASSIUM CHLORIDE 10 MEQ: 7.46 INJECTION, SOLUTION INTRAVENOUS at 09:10

## 2024-10-12 RX ADMIN — MAGNESIUM SULFATE HEPTAHYDRATE 2 G: 40 INJECTION, SOLUTION INTRAVENOUS at 10:10

## 2024-10-12 RX ADMIN — ONDANSETRON 8 MG: 8 TABLET, ORALLY DISINTEGRATING ORAL at 09:10

## 2024-10-12 RX ADMIN — Medication: at 09:10

## 2024-10-12 RX ADMIN — POTASSIUM CHLORIDE 10 MEQ: 7.46 INJECTION, SOLUTION INTRAVENOUS at 10:10

## 2024-10-12 RX ADMIN — MYCOPHENOLATE MOFETIL 1000 MG: 200 POWDER, FOR SUSPENSION ORAL at 02:10

## 2024-10-12 RX ADMIN — TACROLIMUS 0.5 MG: 0.5 CAPSULE ORAL at 09:10

## 2024-10-12 RX ADMIN — OLANZAPINE 5 MG: 2.5 TABLET, FILM COATED ORAL at 09:10

## 2024-10-12 RX ADMIN — ONDANSETRON 8 MG: 8 TABLET, ORALLY DISINTEGRATING ORAL at 05:10

## 2024-10-12 RX ADMIN — PANTOPRAZOLE SODIUM 40 MG: 40 TABLET, DELAYED RELEASE ORAL at 09:10

## 2024-10-12 RX ADMIN — GABAPENTIN 600 MG: 250 SOLUTION ORAL at 09:10

## 2024-10-12 RX ADMIN — TACROLIMUS 1 MG: 1 CAPSULE ORAL at 06:10

## 2024-10-12 RX ADMIN — PROCHLORPERAZINE EDISYLATE 5 MG: 5 INJECTION INTRAMUSCULAR; INTRAVENOUS at 11:10

## 2024-10-12 RX ADMIN — CARVEDILOL 6.25 MG: 6.25 TABLET, FILM COATED ORAL at 09:10

## 2024-10-12 RX ADMIN — LETERMOVIR 480 MG: 480 TABLET, FILM COATED ORAL at 10:10

## 2024-10-12 RX ADMIN — TRAZODONE HYDROCHLORIDE 50 MG: 50 TABLET ORAL at 09:10

## 2024-10-12 RX ADMIN — LOPERAMIDE HYDROCHLORIDE 2 MG: 2 CAPSULE ORAL at 12:10

## 2024-10-12 NOTE — PLAN OF CARE
Side rails up x2; call bell in place; bed in lowest, locked position; skid proof socks on; no evidence of skin breakdown; care plan explained to patient; pt remains free of injury. Pt is day + 23 of a Haplo SCT. Pt with poor appetite, voids, diarrhea BM x 2,  imodium given,  ambulates in room with walker and standby assist. Pt given KCL  IVBP x 2 and magnesium sulfate IVPB x 1. Frequent rounding in progress pt encouraged to call as needed, bed alarm on. VSS and afebrile.

## 2024-10-12 NOTE — PLAN OF CARE
Recommendations  1.) Continue Regular diet   2.) [place] 1.4 BID   3.) RD following, consult if needed  Goals: 1.) Meet >75% EEN/EPN  Nutrition Goal Status: new  Communication of RD Recs: other (comment) (POC)

## 2024-10-12 NOTE — PROGRESS NOTES
Janusz Ma - Oncology ([facility])  Hematology  Bone Marrow Transplant  Progress Note    Patient Name: Guillaume Salnias  Admission Date: 9/13/2024  Hospital Length of Stay: 29 days  Code Status: Full Code    Subjective:     Interval History: D+22 s/p  + TBI + PT Cy Haploidentical (son) BMT for MDS. Engrafted D+20. ANC 1742 today, stop G-CSF tomorrow if sustained improvement. Continues to have diarrhea, cholestyramine helping somewhat. Encourage imodium prn usage. If no improvement, plan to check C.diff. Tacro 8.3.  Wife at bedside.     Objective:     Vital Signs (Most Recent):  Temp: 98.7 °F (37.1 °C) (10/12/24 0800)  Pulse: 91 (10/12/24 0800)  Resp: 16 (10/12/24 0800)  BP: 126/69 (10/12/24 0800)  SpO2: 97 % (10/12/24 0800) Vital Signs (24h Range):  Temp:  [98.1 °F (36.7 °C)-99.1 °F (37.3 °C)] 98.7 °F (37.1 °C)  Pulse:  [80-91] 91  Resp:  [16-18] 16  SpO2:  [87 %-97 %] 97 %  BP: (117-145)/(62-75) 126/69     Weight: 73.3 kg (161 lb 9.6 oz)  Body mass index is 23.86 kg/m².  Body surface area is 1.89 meters squared.    ECOG SCORE           [unfilled]    Intake/Output - Last 3 Shifts         10/10 0700  10/11 0659 10/11 0700  10/12 0659 10/12 0700  10/13 0659    P.O. 268 930     I.V. (mL/kg) 99.2 (1.3)      Blood  645.6     Total Intake(mL/kg) 367.2 (4.8) 1575.6 (21.5)     Urine (mL/kg/hr) 1300 (0.7) 600 (0.3)     Emesis/NG output 0      Stool 0 0     Total Output 1300 600     Net -932.8 +975.6            Urine Occurrence  3 x     Stool Occurrence 1 x 5 x     Emesis Occurrence 1 x               Physical Exam  Vitals and nursing note reviewed.   Constitutional:       Appearance: Normal appearance. He is well-developed.      Comments: Frail    HENT:      Head: Normocephalic and atraumatic.      Nose: Nose normal.      Mouth/Throat:      Mouth: Mucous membranes are dry.      Pharynx: No oropharyngeal exudate or posterior oropharyngeal erythema.   Eyes:      General:         Right eye: No discharge.         Left  eye: No discharge.      Extraocular Movements: Extraocular movements intact.      Conjunctiva/sclera: Conjunctivae normal.   Cardiovascular:      Rate and Rhythm: Normal rate and regular rhythm.      Heart sounds: Normal heart sounds. No murmur heard.  Pulmonary:      Effort: Pulmonary effort is normal. No respiratory distress.      Breath sounds: Normal breath sounds. No wheezing or rales.   Abdominal:      General: Bowel sounds are normal. There is no distension.      Palpations: Abdomen is soft.      Tenderness: There is no abdominal tenderness.   Musculoskeletal:         General: No deformity. Normal range of motion.      Cervical back: Normal range of motion and neck supple.      Right lower leg: No edema.      Left lower leg: No edema.   Skin:     General: Skin is warm and dry.      Findings: Bruising present. No erythema or rash.      Comments: Right chest wall vas cath. Dressing c/d/i. No sign of infection to site.   Neurological:      General: No focal deficit present.      Mental Status: He is alert and oriented to person, place, and time.   Psychiatric:         Mood and Affect: Mood normal.         Behavior: Behavior normal.         Thought Content: Thought content normal.         Judgment: Judgment normal.            Significant Labs:   CBC:   Recent Labs   Lab 10/11/24  0358 10/12/24  0346   WBC 1.49* 2.42*   HGB 7.2* 7.2*   HCT 20.6* 21.1*   PLT 6* 22*    and CMP:   Recent Labs   Lab 10/11/24  0358 10/12/24  0346    139   K 3.5 3.6    104   CO2 25 30*   * 125*   BUN 9 10   CREATININE 0.7 0.7   CALCIUM 8.4* 8.7   PROT 5.1* 5.2*   ALBUMIN 2.7* 2.8*   BILITOT 0.6 0.5   ALKPHOS 55 55   AST 14 15   ALT 11 14   ANIONGAP 7* 5*       Diagnostic Results:  I have reviewed all pertinent imaging results/findings within the past 24 hours.  Assessment/Plan:     * S/P allogeneic bone marrow transplant  - Patient of Dr. Paco Hickey with MDS  - Admitted 9/13/24 for a  TBI PT Cy haplo (son)  allogeneic SCT. Today is Day +23  Engrafted D+20 (10/9) with ANC of 531  - ANC today 1162  - Tacro level 8.3 on 10/11; continue dose of 0.5mg AM and 1mg PM.   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 None noted thus far.  - Patient is B+. Donor is A+.  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 Patient will start letermovir ppx on 9/24/24.  - Received fresh BMT product on 9/19/24 with a CD34 dose of 3.55 x10^6.  - Of note, cilostazol may interact with tacro. (Currently on hold for plts < 50K).  - See treatment plan below:    Planned conditioning regimen:  Fludarabine on Day -5, -4, -3, and -2  Melphalan on Day -2  TBI on Day -1     Planned  GVHD Prophylaxis:  Cyclophosphamide (with Mesna) on Days +3 and +4  Mycophenolate starting on Day +5  Tacrolimus starting on Day +5     Antimicrobial Prophylaxis:  Acyclovir starting on Day -5  Levofloxacin starting on Day -1  Micafungin on Day -1 through Day +4  Letermovir starting on Day +5 (if CMV PCR drawn on day 0 results negative)  Posaconazole starting on Day +5  Bactrim starting on Day +30     SOS/VOD Prophylaxis:  Ursodiol on Day -5 through Day +30      Growth Factor Support:  Neupogen starting on Day +5     Caregiver: wife   Post-transplant discharge plans: home    Hypomagnesemia  - See electrolyte disorder  - replace PRN  - Tacro likely contributing  - will need scheduled magnesium on discharge (currently on 800mg BID)    Electrolyte disorder  - Replete per PRN electrolyte order set  - Daily CMP, mag, and phos while inpatient    Moderate malnutrition  Nutrition consulted. Most recent weight and BMI monitored-     Measurements:  Wt Readings from Last 1 Encounters:   10/08/24 76 kg (167 lb 7 oz)   Body mass index is 24.73 kg/m².    Patient has been screened and assessed by RD.    - Switched boost plus to boost breeze as diary of plus making patient nauseous  - PO intake as tolerated; on scheduled antiemetics for nausea  - stopped IVF with engraftment;  encouraged increased PO intake in order to DC    Dry mouth  - continue to moisten lips with chapstick  - encouraged use of oral hygiene rinses to sterilize mouth and stimulate saliva production  - consider speech consult on 10/11 if patient still reporting difficulties swallowing    Urinary frequency  - reports increased nightly frequency and urgency with low output  - no formal BPH diagnosis; started flomax 9/27  - improved; stopped on 10/10    Chemotherapy-induced nausea  - scheduled zofran IV 8mg q8h on 9/27  - added scheduled compazine 9/30  - added 5mg zyprexa at night 10/8  - has prn ativan  - antiemetics now PO      Hemorrhoids  - d/t chemo induced diarrhea  - has anusol, tucks pads, and LETS gel  - patient denies any bleeding at site  - has PRN morphine for pain control  - hemorrhoid pain improving with diarrhea control    Headache  - C/o headache 9/25  - Daily coffee drinker. Improved after drinking coffee.  - Suspect caffeine withdrawal.  - Can start caffeine tablet if patient is unable to drink coffee due to chemo side effects  - none today    Chemotherapy induced diarrhea  - C-diff neg 9/24  - Of note, was treated empirically for c-diff with oral vanc prior to admission and is receiving oral vanc ppx while admitted.  - Receiving scheduled imodium and PRN lomotil  - Started Questran 10/1.  - Diarrhea improved, so changed imodium from scheduled to PRN    Neutropenic fever  - First fever with tmax 101.8F on 9/23  - Infection w/u unremarkable thus far  - No fevers since 9/23, Cefepime stopped on 9/25 and back on oral ppx LVQ.  - LVQ stopped 10/10 with ANC >1000  RESOLVED    Insomnia  - states melatonin does not work  - started on scheduled trazodone 10/8    History of Clostridioides difficile infection  - Was treated empirically for c-diff with oral vanc prior to admission.  - Continue oral vanc ppx per transplant protocol      Neuropathy  - Continue home gabapentin    Pancytopenia due to antineoplastic  chemotherapy  - Daily CBC while inpatient  - Transfuse for hgb <7 Plt  or <10K  - Continue acyclovir and posaconazole; levaquin stopped 10/10 with ANC >1000  - Holding cilostazol for plts < 50K  ANC 1742 today    Myelodysplastic syndrome  From Dr Hickey's clinic note 8/5:  Stem cell transplant candidate: We discussed the role for allogeneic stem cell transplantation in this disease process as a potentially curative option. We had an extensive discussion about the rationale, logistics, risks, and benefits. We reviewed the requirement to stay in the [place] area for 100 days with a caregiver at all times. We discussed the risks, including infection, graft failure, organ toxicity, graft versus host disease, relapse of disease, and secondary cancers. We reviewed the need for long-term immunosuppression and need for close monitoring. HCT-CI of 1 (intermediate risk). We had a prolonged discussion today regarding his recent deconditioning, Cdiff, and underlying disease. He is now much improved since last seen, and is improving his strength since starting to work with PT. Diarrhea now controlled. We discussed that our plan to move forward with the transplant process.   Coordinator: Fay Stephens  Regimen:  + 2Gy TBI  Donor: son (haplo)   Graft source: BM  - Admitted 9/13/24 for a  TBI PT Cy haplo (son) allogeneic BMT    Hypertension, essential  - Was holding home Coreg for fluid responsive hypotension. Resumed 9/30.    Coronary artery disease involving native coronary artery of native heart without angina pectoris  - Was holding home Coreg for hypotension. Resumed 9/30.    Physical debility  - PT/OT following while inpatient  - recommending HH with rolling walker  - The mobility limitation cannot be sufficiently resolved by the use of a cane. Patient's functional mobility deficit can be sufficiently resolved with the use of a Rolling Walker. Patient's mobility limitation significantly impairs their ability  to participate in one of more activities of daily living.  The use of a Rolling Walker, Walker will significantly improve the patient's ability to participate in MRADLS and the patient will use it on regular basis in the home.        VTE Risk Mitigation (From admission, onward)           Ordered     heparin, porcine (PF) 100 unit/mL injection flush 300 Units  As needed (PRN)         09/13/24 7654                    Disposition: ADITI Smith MD  Bone Marrow Transplant  [facility] - Oncology ([facility])

## 2024-10-12 NOTE — SUBJECTIVE & OBJECTIVE
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 Engrafted D+20. ANC 1742 today, stop G-CSF tomorrow if sustained improvement. Continues to have diarrhea, cholestyramine helping somewhat. Encourage imodium prn usage. If no improvement, plan to check C.diff. Tacro 8.3.  Wife at bedside.     Objective:     Vital Signs (Most Recent):  Temp: 98.7 °F (37.1 °C) (10/12/24 0800)  Pulse: 91 (10/12/24 0800)  Resp: 16 (10/12/24 0800)  BP: 126/69 (10/12/24 0800)  SpO2: 97 % (10/12/24 0800) Vital Signs (24h Range):  Temp:  [98.1 °F (36.7 °C)-99.1 °F (37.3 °C)] 98.7 °F (37.1 °C)  Pulse:  [80-91] 91  Resp:  [16-18] 16  SpO2:  [87 %-97 %] 97 %  BP: (117-145)/(62-75) 126/69     Weight: 73.3 kg (161 lb 9.6 oz)  Body mass index is 23.86 kg/m².  Body surface area is 1.89 meters squared.    ECOG SCORE           [unfilled]    Intake/Output - Last 3 Shifts         10/10 0700  10/11 0659 10/11 0700  10/12 0659 10/12 0700  10/13 0659    P.O. 268 930     I.V. (mL/kg) 99.2 (1.3)      Blood  645.6     Total Intake(mL/kg) 367.2 (4.8) 1575.6 (21.5)     Urine (mL/kg/hr) 1300 (0.7) 600 (0.3)     Emesis/NG output 0      Stool 0 0     Total Output 1300 600     Net -932.8 +975.6            Urine Occurrence  3 x     Stool Occurrence 1 x 5 x     Emesis Occurrence 1 x               Physical Exam  Vitals and nursing note reviewed.   Constitutional:       Appearance: Normal appearance. He is well-developed.      Comments: Frail    HENT:      Head: Normocephalic and atraumatic.      Nose: Nose normal.      Mouth/Throat:      Mouth: Mucous membranes are dry.      Pharynx: No oropharyngeal exudate or posterior oropharyngeal erythema.   Eyes:      General:         Right eye: No discharge.         Left eye: No discharge.      Extraocular Movements: Extraocular movements intact.      Conjunctiva/sclera: Conjunctivae normal.   Cardiovascular:      Rate and Rhythm: Normal rate and regular rhythm.      Heart sounds:  Normal heart sounds. No murmur heard.  Pulmonary:      Effort: Pulmonary effort is normal. No respiratory distress.      Breath sounds: Normal breath sounds. No wheezing or rales.   Abdominal:      General: Bowel sounds are normal. There is no distension.      Palpations: Abdomen is soft.      Tenderness: There is no abdominal tenderness.   Musculoskeletal:         General: No deformity. Normal range of motion.      Cervical back: Normal range of motion and neck supple.      Right lower leg: No edema.      Left lower leg: No edema.   Skin:     General: Skin is warm and dry.      Findings: Bruising present. No erythema or rash.      Comments: Right chest wall vas cath. Dressing c/d/i. No sign of infection to site.   Neurological:      General: No focal deficit present.      Mental Status: He is alert and oriented to person, place, and time.   Psychiatric:         Mood and Affect: Mood normal.         Behavior: Behavior normal.         Thought Content: Thought content normal.         Judgment: Judgment normal.            Significant Labs:   CBC:   Recent Labs   Lab 10/11/24  0358 10/12/24  0346   WBC 1.49* 2.42*   HGB 7.2* 7.2*   HCT 20.6* 21.1*   PLT 6* 22*    and CMP:   Recent Labs   Lab 10/11/24  0358 10/12/24  0346    139   K 3.5 3.6    104   CO2 25 30*   * 125*   BUN 9 10   CREATININE 0.7 0.7   CALCIUM 8.4* 8.7   PROT 5.1* 5.2*   ALBUMIN 2.7* 2.8*   BILITOT 0.6 0.5   ALKPHOS 55 55   AST 14 15   ALT 11 14   ANIONGAP 7* 5*       Diagnostic Results:  I have reviewed all pertinent imaging results/findings within the past 24 hours.

## 2024-10-12 NOTE — PLAN OF CARE
Pt is day +23 FluMel Haplo SCT. No acute events occurred during the shift. Scheduled compazine and Zofran given. No PRNs needed. No complains of pain. Pt is up with assist with walker. No difficulty voiding.  Pt is on 1L of oxygen and stats between 92-95%. VS have been stable throughout shift and pt is afebrile. Pt AOx4. POC reviewed with patient and his wife. Both verbalized an understanding. Questions encouraged and answered. Bed is in the lowest position and locked. Non skid socks worn. Call light within reach. Pt encouraged to call for assistance when needed.

## 2024-10-12 NOTE — ASSESSMENT & PLAN NOTE
- Patient of Dr. 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 Today is Day +23  Engrafted D+20 (10/9) with ANC of 531  - ANC today 1162  - Tacro level 8.3 on 10/11; continue dose of 0.5mg AM and 1mg PM.   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 None noted thus far.  - Patient is B+. Donor is A+.  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 Patient will start letermovir ppx on 9/24/24.  - Received fresh BMT product on 9/19/24 with a CD34 dose of 3.55 x10^6.  - Of note, cilostazol may interact with tacro. (Currently on hold for plts < 50K).  - See treatment plan below:    Planned conditioning regimen:  Fludarabine on Day -5, -4, -3, and -2  Melphalan on Day -2  TBI on Day -1     Planned  GVHD Prophylaxis:  Cyclophosphamide (with Mesna) on Days +3 and +4  Mycophenolate starting on Day +5  Tacrolimus starting on Day +5     Antimicrobial Prophylaxis:  Acyclovir starting on Day -5  Levofloxacin starting on Day -1  Micafungin on Day -1 through Day +4  Letermovir starting on Day +5 (if CMV PCR drawn on day 0 results negative)  Posaconazole starting on Day +5  Bactrim starting on Day +30     SOS/VOD Prophylaxis:  Ursodiol on Day -5 through Day +30      Growth Factor Support:  Neupogen starting on Day +5     Caregiver: wife   Post-transplant discharge plans: home

## 2024-10-12 NOTE — ASSESSMENT & PLAN NOTE
- Daily CBC while inpatient  - Transfuse for hgb <7 Plt  or <10K  - Continue acyclovir and posaconazole; levaquin stopped 10/10 with ANC >1000  - Holding cilostazol for plts < 50K  ANC 1742 today

## 2024-10-12 NOTE — PROGRESS NOTES
"[facility] (H[facility])  Adult Nutrition  Progress Note    SUMMARY       Recommendations  1.) Continue Regular diet   2.) [place] 1.4 BID   3.) RD following, consult if needed  Goals: 1.) Meet >75% EEN/EPN  Nutrition Goal Status: new  Communication of RD Recs: other (comment) (POC)    Assessment and Plan    Nutrition Problem  Increased calorie and protein needs     Related to (etiology):   Physiological causes     Signs and Symptoms (as evidenced by):   Upcoming SCT     Interventions/Recommendations (treatment strategy):  Collaboration of nutrition care with other providers     Nutrition Diagnosis Status:   Continues       Reason for Assessment    Reason For Assessment: RD follow-up  Diagnosis: cancer diagnosis/related complications (S/P allogenic bone marrow transplant)  General Information Comments: 68 y/o PMH MDS, PVD, CAD, HTN admitted for haploSCT for tx of MDS. RD follow-up. Pt in Radiology at time of visit. Noted 50% PO intake per chart - intake slowly improving from last follow-up.   Nutrition Discharge Planning: General healthy diet    Nutrition Risk Screen    Nutrition Risk Screen: no indicators present    Nutrition/Diet History    Patient Reported Diet/Restrictions/Preferences: general  Spiritual, Cultural Beliefs, Jewish Practices, Values that Affect Care: no  Food Allergies: NKFA  Factors Affecting Nutritional Intake: nausea/vomiting, diarrhea    Anthropometrics    Temp: 98.6 °F (37 °C)  Height Method: Stated  Height: 5' 9" (175.3 cm)  Height (inches): 69 in  Weight Method: Standard Scale  Weight: 73.3 kg (161 lb 9.6 oz)  Weight (lb): 161.6 lb  Ideal Body Weight (IBW), Male: 160 lb  % Ideal Body Weight, Male (lb): 101 %  BMI (Calculated): 23.9  BMI Grade: 18.5-24.9 - normal       Lab/Procedures/Meds    Pertinent Labs Reviewed: reviewed - hemoglobin 7.2, hematocrit 21.1, eGFR 57, glucose 125, magnesium 1.4    Pertinent Medications Reviewed: reviewed - gabapentin, magnesium sulfate, " pantoprazole, potassium chloride     Estimated/Assessed Needs    Weight Used For Calorie Calculations: 73.3 kg (161 lb 9.6 oz)  Energy Calorie Requirements (kcal): 3290-2527 kcal/day (25-30 kcal/kg)  Energy Need Method: Kcal/kg  Protein Requirements:  g/day (1.2-1.5 g/kg)  Weight Used For Protein Calculations: 73.3 kg (161 lb 9.6 oz)     Estimated Fluid Requirement Method: RDA Method  RDA Method (mL): 1833         Nutrition Prescription Ordered    Current Diet Order: Regular    Evaluation of Received Nutrient/Fluid Intake    I/O: + 10.6 L since admit  Energy Calories Required: not meeting needs  Protein Required: not meeting needs  Fluid Required: other (see comments) (As per MD)  Comments: [facility] 9/26  Tolerance: tolerating  % Intake of Estimated Energy Needs: 50 - 75 %  % Meal Intake: 50 - 75 %    Nutrition Risk    Level of Risk/Frequency of Follow-up: moderate     Monitor and Evaluation    Food and Nutrient Intake: energy intake, food and beverage intake  Food and Nutrient Adminstration: diet order  Knowledge/Beliefs/Attitudes: food and nutrition knowledge/skill  Physical Activity and Function: nutrition-related ADLs and IADLs  Anthropometric Measurements: weight, weight change, body mass index  Biochemical Data, Medical Tests and Procedures: electrolyte and renal panel, gastrointestinal profile, glucose/endocrine profile, inflammatory profile, lipid profile  Nutrition-Focused Physical Findings: overall appearance, extremities, muscles and bones, head and eyes, skin     Nutrition Follow-Up    RD Follow-up?: Yes    Judy Antonio, Registration Eligible, Provisional LDN        Unknown

## 2024-10-12 NOTE — ASSESSMENT & PLAN NOTE
From Dr Hickey's clinic note 8/5:  Stem cell transplant candidate: We discussed the role for allogeneic stem cell transplantation in this disease process as a potentially curative option. We had an extensive discussion about the rationale, logistics, risks, and benefits. We reviewed the requirement to stay in the [place] area for 100 days with a caregiver at all times. We discussed the risks, including infection, graft failure, organ toxicity, graft versus host disease, relapse of disease, and secondary cancers. We reviewed the need for long-term immunosuppression and need for close monitoring. HCT-CI of 1 (intermediate risk). We had a prolonged discussion today regarding his recent deconditioning, Cdiff, and underlying disease. He is now much improved since last seen, and is improving his strength since starting to work with PT. Diarrhea now controlled. We discussed that our plan to move forward with the transplant process.   Coordinator: Fay Stephens  Regimen:  + 2Gy TBI  Donor: son (haplo)   Graft source: BM  - Admitted 9/13/24 for a  TBI PT Cy haplo (son) allogeneic BMT   The patient's PCP (Steve Aponte) referred this patient to Gastroenterology for a screening colonoscopy. Per Direct Screening Colonoscopy protocol, I reviewed the patient's recent history & physical. The patient meets DSC criteria. LVM for patient to contact office to schedule their colonoscopy. [phone]    Pt can be scheduled for colon w/ MAC w/ Dr. Aponte

## 2024-10-13 LAB
ALBUMIN SERPL BCP-MCNC: 2.8 G/DL (ref 3.5–5.2)
ALP SERPL-CCNC: 58 U/L (ref 55–135)
ALT SERPL W/O P-5'-P-CCNC: 14 U/L (ref 10–44)
ANION GAP SERPL CALC-SCNC: 7 MMOL/L (ref 8–16)
ANISOCYTOSIS BLD QL SMEAR: SLIGHT
AST SERPL-CCNC: 15 U/L (ref 10–40)
BASOPHILS # BLD AUTO: ABNORMAL K/UL (ref 0–0.2)
BASOPHILS NFR BLD: 0 % (ref 0–1.9)
BILIRUB SERPL-MCNC: 0.5 MG/DL (ref 0.1–1)
BUN SERPL-MCNC: 9 MG/DL (ref 8–23)
CALCIUM SERPL-MCNC: 8.6 MG/DL (ref 8.7–10.5)
CHLORIDE SERPL-SCNC: 105 MMOL/L (ref 95–110)
CO2 SERPL-SCNC: 28 MMOL/L (ref 23–29)
CREAT SERPL-MCNC: 0.8 MG/DL (ref 0.5–1.4)
DIFFERENTIAL METHOD BLD: ABNORMAL
DOHLE BOD BLD QL SMEAR: PRESENT
EOSINOPHIL # BLD AUTO: ABNORMAL K/UL (ref 0–0.5)
EOSINOPHIL NFR BLD: 0 % (ref 0–8)
ERYTHROCYTE [DISTWIDTH] IN BLOOD BY AUTOMATED COUNT: 13.1 % (ref 11.5–14.5)
EST. GFR  (NO RACE VARIABLE): >60 ML/MIN/1.73 M^2
GLUCOSE SERPL-MCNC: 107 MG/DL (ref 70–110)
HCT VFR BLD AUTO: 20.8 % (ref 40–54)
HGB BLD-MCNC: 7.3 G/DL (ref 14–18)
HYPOCHROMIA BLD QL SMEAR: ABNORMAL
IMM GRANULOCYTES # BLD AUTO: ABNORMAL K/UL (ref 0–0.04)
IMM GRANULOCYTES NFR BLD AUTO: ABNORMAL % (ref 0–0.5)
LYMPHOCYTES # BLD AUTO: ABNORMAL K/UL (ref 1–4.8)
LYMPHOCYTES NFR BLD: 0 % (ref 18–48)
MAGNESIUM SERPL-MCNC: 1.4 MG/DL (ref 1.6–2.6)
MCH RBC QN AUTO: 30 PG (ref 27–31)
MCHC RBC AUTO-ENTMCNC: 35.1 G/DL (ref 32–36)
MCV RBC AUTO: 86 FL (ref 82–98)
MONOCYTES # BLD AUTO: ABNORMAL K/UL (ref 0.3–1)
MONOCYTES NFR BLD: 21 % (ref 4–15)
MYELOCYTES NFR BLD MANUAL: 0 %
NEUTROPHILS NFR BLD: 71 % (ref 38–73)
NEUTS BAND NFR BLD MANUAL: 8 %
NRBC BLD-RTO: 0 /100 WBC
OVALOCYTES BLD QL SMEAR: ABNORMAL
PHOSPHATE SERPL-MCNC: 3.4 MG/DL (ref 2.7–4.5)
PLATELET # BLD AUTO: 12 K/UL (ref 150–450)
PLATELET BLD QL SMEAR: ABNORMAL
PMV BLD AUTO: 10.3 FL (ref 9.2–12.9)
POIKILOCYTOSIS BLD QL SMEAR: SLIGHT
POLYCHROMASIA BLD QL SMEAR: ABNORMAL
POTASSIUM SERPL-SCNC: 4 MMOL/L (ref 3.5–5.1)
PROT SERPL-MCNC: 5.2 G/DL (ref 6–8.4)
RBC # BLD AUTO: 2.43 M/UL (ref 4.6–6.2)
SODIUM SERPL-SCNC: 140 MMOL/L (ref 136–145)
TOXIC GRANULES BLD QL SMEAR: PRESENT
WBC # BLD AUTO: 3.71 K/UL (ref 3.9–12.7)

## 2024-10-13 PROCEDURE — 63600175 PHARM REV CODE 636 W HCPCS: Performed by: NURSE PRACTITIONER

## 2024-10-13 PROCEDURE — 27000207 HC ISOLATION

## 2024-10-13 PROCEDURE — 25000003 PHARM REV CODE 250: Performed by: INTERNAL MEDICINE

## 2024-10-13 PROCEDURE — 63600175 PHARM REV CODE 636 W HCPCS: Mod: JZ,JG | Performed by: INTERNAL MEDICINE

## 2024-10-13 PROCEDURE — 20600001 HC STEP DOWN PRIVATE ROOM

## 2024-10-13 PROCEDURE — 99900035 HC TECH TIME PER 15 MIN (STAT)

## 2024-10-13 PROCEDURE — 87209 SMEAR COMPLEX STAIN: CPT | Performed by: STUDENT IN AN ORGANIZED HEALTH CARE EDUCATION/TRAINING PROGRAM

## 2024-10-13 PROCEDURE — 80053 COMPREHEN METABOLIC PANEL: CPT | Performed by: NURSE PRACTITIONER

## 2024-10-13 PROCEDURE — 87324 CLOSTRIDIUM AG IA: CPT | Performed by: STUDENT IN AN ORGANIZED HEALTH CARE EDUCATION/TRAINING PROGRAM

## 2024-10-13 PROCEDURE — 85007 BL SMEAR W/DIFF WBC COUNT: CPT | Performed by: NURSE PRACTITIONER

## 2024-10-13 PROCEDURE — 87425 ROTAVIRUS AG IA: CPT | Performed by: STUDENT IN AN ORGANIZED HEALTH CARE EDUCATION/TRAINING PROGRAM

## 2024-10-13 PROCEDURE — 85027 COMPLETE CBC AUTOMATED: CPT | Performed by: NURSE PRACTITIONER

## 2024-10-13 PROCEDURE — 87329 GIARDIA AG IA: CPT | Performed by: STUDENT IN AN ORGANIZED HEALTH CARE EDUCATION/TRAINING PROGRAM

## 2024-10-13 PROCEDURE — 63600175 PHARM REV CODE 636 W HCPCS: Performed by: STUDENT IN AN ORGANIZED HEALTH CARE EDUCATION/TRAINING PROGRAM

## 2024-10-13 PROCEDURE — 87799 DETECT AGENT NOS DNA QUANT: CPT | Performed by: STUDENT IN AN ORGANIZED HEALTH CARE EDUCATION/TRAINING PROGRAM

## 2024-10-13 PROCEDURE — 87449 NOS EACH ORGANISM AG IA: CPT | Performed by: STUDENT IN AN ORGANIZED HEALTH CARE EDUCATION/TRAINING PROGRAM

## 2024-10-13 PROCEDURE — 84100 ASSAY OF PHOSPHORUS: CPT | Performed by: NURSE PRACTITIONER

## 2024-10-13 PROCEDURE — 83735 ASSAY OF MAGNESIUM: CPT | Performed by: NURSE PRACTITIONER

## 2024-10-13 PROCEDURE — 25000003 PHARM REV CODE 250: Performed by: NURSE PRACTITIONER

## 2024-10-13 PROCEDURE — 87507 IADNA-DNA/RNA PROBE TQ 12-25: CPT | Performed by: STUDENT IN AN ORGANIZED HEALTH CARE EDUCATION/TRAINING PROGRAM

## 2024-10-13 RX ORDER — MAGNESIUM SULFATE HEPTAHYDRATE 40 MG/ML
4 INJECTION, SOLUTION INTRAVENOUS ONCE
Status: COMPLETED | OUTPATIENT
Start: 2024-10-13 | End: 2024-10-13

## 2024-10-13 RX ADMIN — MAGNESIUM SULFATE HEPTAHYDRATE 4 G: 40 INJECTION, SOLUTION INTRAVENOUS at 04:10

## 2024-10-13 RX ADMIN — PROCHLORPERAZINE EDISYLATE 5 MG: 5 INJECTION INTRAMUSCULAR; INTRAVENOUS at 05:10

## 2024-10-13 RX ADMIN — URSODIOL 300 MG: 300 CAPSULE ORAL at 09:10

## 2024-10-13 RX ADMIN — PANTOPRAZOLE SODIUM 40 MG: 40 TABLET, DELAYED RELEASE ORAL at 09:10

## 2024-10-13 RX ADMIN — VANCOMYCIN HYDROCHLORIDE 125 MG: KIT at 09:10

## 2024-10-13 RX ADMIN — CARVEDILOL 6.25 MG: 6.25 TABLET, FILM COATED ORAL at 09:10

## 2024-10-13 RX ADMIN — LOPERAMIDE HYDROCHLORIDE 2 MG: 2 CAPSULE ORAL at 06:10

## 2024-10-13 RX ADMIN — ACYCLOVIR 800 MG: 200 SUSPENSION ORAL at 09:10

## 2024-10-13 RX ADMIN — MYCOPHENOLATE MOFETIL 1000 MG: 200 POWDER, FOR SUSPENSION ORAL at 09:10

## 2024-10-13 RX ADMIN — PROCHLORPERAZINE EDISYLATE 5 MG: 5 INJECTION INTRAMUSCULAR; INTRAVENOUS at 06:10

## 2024-10-13 RX ADMIN — Medication 1 DOSE: at 12:10

## 2024-10-13 RX ADMIN — OLANZAPINE 5 MG: 2.5 TABLET, FILM COATED ORAL at 09:10

## 2024-10-13 RX ADMIN — LIDOCAINE 5% 1 PATCH: 700 PATCH TOPICAL at 09:10

## 2024-10-13 RX ADMIN — ONDANSETRON 8 MG: 8 TABLET, ORALLY DISINTEGRATING ORAL at 05:10

## 2024-10-13 RX ADMIN — Medication: at 10:10

## 2024-10-13 RX ADMIN — Medication 1 DOSE: at 09:10

## 2024-10-13 RX ADMIN — HYDROCORTISONE: 25 CREAM TOPICAL at 10:10

## 2024-10-13 RX ADMIN — ONDANSETRON 8 MG: 8 TABLET, ORALLY DISINTEGRATING ORAL at 09:10

## 2024-10-13 RX ADMIN — Medication 1 DOSE: at 10:10

## 2024-10-13 RX ADMIN — POSACONAZOLE 300 MG: 100 TABLET, DELAYED RELEASE ORAL at 09:10

## 2024-10-13 RX ADMIN — GABAPENTIN 600 MG: 250 SOLUTION ORAL at 11:10

## 2024-10-13 RX ADMIN — PROCHLORPERAZINE EDISYLATE 5 MG: 5 INJECTION INTRAMUSCULAR; INTRAVENOUS at 11:10

## 2024-10-13 RX ADMIN — FILGRASTIM 300 MCG: 300 INJECTION, SOLUTION INTRAVENOUS; SUBCUTANEOUS at 09:10

## 2024-10-13 RX ADMIN — MYCOPHENOLATE MOFETIL 1000 MG: 200 POWDER, FOR SUSPENSION ORAL at 02:10

## 2024-10-13 RX ADMIN — Medication: at 11:10

## 2024-10-13 RX ADMIN — TACROLIMUS 1 MG: 1 CAPSULE ORAL at 06:10

## 2024-10-13 RX ADMIN — GABAPENTIN 600 MG: 250 SOLUTION ORAL at 09:10

## 2024-10-13 RX ADMIN — LETERMOVIR 480 MG: 480 TABLET, FILM COATED ORAL at 10:10

## 2024-10-13 RX ADMIN — HYDROCORTISONE: 25 CREAM TOPICAL at 09:10

## 2024-10-13 RX ADMIN — TRAZODONE HYDROCHLORIDE 50 MG: 50 TABLET ORAL at 09:10

## 2024-10-13 RX ADMIN — ONDANSETRON 8 MG: 8 TABLET, ORALLY DISINTEGRATING ORAL at 02:10

## 2024-10-13 RX ADMIN — PROCHLORPERAZINE EDISYLATE 5 MG: 5 INJECTION INTRAMUSCULAR; INTRAVENOUS at 12:10

## 2024-10-13 RX ADMIN — TACROLIMUS 0.5 MG: 0.5 CAPSULE ORAL at 09:10

## 2024-10-13 RX ADMIN — LIDOCAINE 5% 1 PATCH: 700 PATCH TOPICAL at 10:10

## 2024-10-13 NOTE — ASSESSMENT & PLAN NOTE
- Patient of Dr. 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 Today is Day +24  Engrafted D+20 (10/9) with ANC of 531  - ANC today 1162  - Tacro level 8.3 on 10/11; continue dose of 0.5mg AM and 1mg PM.   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 None noted thus far.  - Patient is B+. Donor is A+.  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 Patient will start letermovir ppx on 9/24/24.  - Received fresh BMT product on 9/19/24 with a CD34 dose of 3.55 x10^6.  - Of note, cilostazol may interact with tacro. (Currently on hold for plts < 50K).  - See treatment plan below:    Planned conditioning regimen:  Fludarabine on Day -5, -4, -3, and -2  Melphalan on Day -2  TBI on Day -1     Planned  GVHD Prophylaxis:  Cyclophosphamide (with Mesna) on Days +3 and +4  Mycophenolate starting on Day +5  Tacrolimus starting on Day +5     Antimicrobial Prophylaxis:  Acyclovir starting on Day -5  Levofloxacin starting on Day -1  Micafungin on Day -1 through Day +4  Letermovir starting on Day +5 (if CMV PCR drawn on day 0 results negative)  Posaconazole starting on Day +5  Bactrim starting on Day +30     SOS/VOD Prophylaxis:  Ursodiol on Day -5 through Day +30      Growth Factor Support:  Neupogen starting on Day +5, stopped D+24. Engrafted D+20     Caregiver: wife   Post-transplant discharge plans: home

## 2024-10-13 NOTE — PLAN OF CARE
Pt is day +24 FluMel Haplo SCT. Went over POC with pt at beginning of shift.  Questions were asked and answered.  AAO x 4.  Afebrile. O2 at 1L/min via NC, stats 93-95%. Up with 1 person assist. Voiding without difficulty. Watery BM*4, imodium given. No complaints of pain. Electrolytes replaced PRN as per protocol. Pt remained free from injury with bed in low locked position, call light with in reach, bed alarm on, non-skid socks, and frequent rounding.  Pt instructed to call for assistance as needed. Denies needs at this time.

## 2024-10-13 NOTE — PROGRESS NOTES
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 Engrafted D+20.  ANC 2930 today, stop G-CSF. Diarrhea persists and worsening. Checking C diff and stool studies. If negative, plan to schedule imodium.     Objective:     Vital Signs (Most Recent):  Temp: 98.4 °F (36.9 °C) (10/13/24 1128)  Pulse: 86 (10/13/24 1128)  Resp: 20 (10/13/24 1128)  BP: 133/62 (10/13/24 1128)  SpO2: (!) 94 % (10/13/24 1128) Vital Signs (24h Range):  Temp:  [97 °F (36.1 °C)-98.8 °F (37.1 °C)] 98.4 °F (36.9 °C)  Pulse:  [] 86  Resp:  [17-20] 20  SpO2:  [93 %-95 %] 94 %  BP: (126-137)/(62-74) 133/62     Weight: 70 kg (154 lb 5.2 oz)  Body mass index is 22.79 kg/m².  Body surface area is 1.85 meters squared.    ECOG SCORE           [unfilled]    Intake/Output - Last 3 Shifts         10/11 0700  10/12 0659 10/12 0700  10/13 0659 10/13 0700  10/14 0659    P.O. 930 590 120    I.V. (mL/kg)  149.9 (2.1)     Blood 645.6      Total Intake(mL/kg) 1575.6 (21.5) 739.9 (10.6) 120 (1.7)    Urine (mL/kg/hr) 600 (0.3) 1275 (0.8) 325 (0.6)    Emesis/NG output       Stool 0 0 0    Total Output 600 1275 325    Net +975.6 -535.1 -205           Urine Occurrence 3 x      Stool Occurrence 5 x 7 x              Physical Exam  Vitals and nursing note reviewed.   Constitutional:       Appearance: Normal appearance. He is well-developed.      Comments: Frail    HENT:      Head: Normocephalic and atraumatic.      Nose: Nose normal.      Mouth/Throat:      Mouth: Mucous membranes are dry.      Pharynx: No oropharyngeal exudate or posterior oropharyngeal erythema.   Eyes:      General:         Right eye: No discharge.         Left eye: No discharge.      Extraocular Movements: Extraocular movements intact.       Conjunctiva/sclera: Conjunctivae normal.   Cardiovascular:      Rate and Rhythm: Normal rate and regular rhythm.      Heart sounds: Normal heart sounds. No murmur heard.  Pulmonary:      Effort: Pulmonary effort is normal. No respiratory distress.      Breath sounds: Normal breath sounds. No wheezing or rales.   Abdominal:      General: Bowel sounds are normal. There is no distension.      Palpations: Abdomen is soft.      Tenderness: There is no abdominal tenderness.   Musculoskeletal:         General: No deformity. Normal range of motion.      Cervical back: Normal range of motion and neck supple.      Right lower leg: No edema.      Left lower leg: No edema.   Skin:     General: Skin is warm and dry.      Findings: Bruising present. No erythema or rash.      Comments: Right chest wall vas cath. Dressing c/d/i. No sign of infection to site.   Neurological:      General: No focal deficit present.      Mental Status: He is alert and oriented to person, place, and time.   Psychiatric:         Mood and Affect: Mood normal.         Behavior: Behavior normal.         Thought Content: Thought content normal.         Judgment: Judgment normal.            Significant Labs:   CBC:   Recent Labs   Lab 10/12/24  0346 10/13/24  0324   WBC 2.42* 3.71*   HGB 7.2* 7.3*   HCT 21.1* 20.8*   PLT 22* 12*    and CMP:   Recent Labs   Lab 10/12/24  0346 10/13/24  0324    140   K 3.6 4.0    105   CO2 30* 28   * 107   BUN 10 9   CREATININE 0.7 0.8   CALCIUM 8.7 8.6*   PROT 5.2* 5.2*   ALBUMIN 2.8* 2.8*   BILITOT 0.5 0.5   ALKPHOS 55 58   AST 15 15   ALT 14 14   ANIONGAP 5* 7*       Diagnostic Results:  I have reviewed all pertinent imaging results/findings within the past 24 hours.  Assessment/Plan:     * S/P allogeneic bone marrow transplant  - Patient of Dr. 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 Today is Day +24  Engrafted D+20 (10/9) with ANC of 531  - ANC today  1162  - Tacro level 8.3 on 10/11; continue dose of 0.5mg AM and 1mg PM.   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 None noted thus far.  - Patient is B+. Donor is A+.  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 Patient will start letermovir ppx on 9/24/24.  - Received fresh BMT product on 9/19/24 with a CD34 dose of 3.55 x10^6.  - Of note, cilostazol may interact with tacro. (Currently on hold for plts < 50K).  - See treatment plan below:    Planned conditioning regimen:  Fludarabine on Day -5, -4, -3, and -2  Melphalan on Day -2  TBI on Day -1     Planned  GVHD Prophylaxis:  Cyclophosphamide (with Mesna) on Days +3 and +4  Mycophenolate starting on Day +5  Tacrolimus starting on Day +5     Antimicrobial Prophylaxis:  Acyclovir starting on Day -5  Levofloxacin starting on Day -1  Micafungin on Day -1 through Day +4  Letermovir starting on Day +5 (if CMV PCR drawn on day 0 results negative)  Posaconazole starting on Day +5  Bactrim starting on Day +30     SOS/VOD Prophylaxis:  Ursodiol on Day -5 through Day +30      Growth Factor Support:  Neupogen starting on Day +5, stopped D+24. Engrafted D+20     Caregiver: wife   Post-transplant discharge plans: home    Hypomagnesemia  - See electrolyte disorder  - replace PRN  - Tacro likely contributing  - will need scheduled magnesium on discharge (currently on 800mg BID)    Electrolyte disorder  - Replete per PRN electrolyte order set  - Daily CMP, mag, and phos while inpatient    Moderate malnutrition  Nutrition consulted. Most recent weight and BMI monitored-     Measurements:  Wt Readings from Last 1 Encounters:   10/08/24 76 kg (167 lb 7 oz)   Body mass index is 24.73 kg/m².    Patient has been screened and assessed by RD.    - Switched boost plus to boost breeze as diary of plus making patient nauseous  - PO intake as tolerated; on scheduled antiemetics for nausea  - stopped IVF with engraftment; encouraged increased PO intake in order to DC    Dry  mouth  - continue to moisten lips with chapstick  - encouraged use of oral hygiene rinses to sterilize mouth and stimulate saliva production  - consider speech consult on 10/11 if patient still reporting difficulties swallowing    Urinary frequency  - reports increased nightly frequency and urgency with low output  - no formal BPH diagnosis; started flomax 9/27  - improved; stopped on 10/10    Chemotherapy-induced nausea  - scheduled zofran IV 8mg q8h on 9/27  - added scheduled compazine 9/30  - added 5mg zyprexa at night 10/8  - has prn ativan  - antiemetics now PO      Hemorrhoids  - d/t chemo induced diarrhea  - has anusol, tucks pads, and LETS gel  - patient denies any bleeding at site  - has PRN morphine for pain control  - hemorrhoid pain improving with diarrhea control    Headache  - C/o headache 9/25  - Daily coffee drinker. Improved after drinking coffee.  - Suspect caffeine withdrawal.  - Can start caffeine tablet if patient is unable to drink coffee due to chemo side effects  - none today    Chemotherapy induced diarrhea  - C-diff neg 9/24  - Of note, was treated empirically for c-diff with oral vanc prior to admission and is receiving oral vanc ppx while admitted.  - Receiving scheduled imodium and PRN lomotil  - Started Questran 10/1.  - Diarrhea improved, so changed imodium from scheduled to PRN    Neutropenic fever  - First fever with tmax 101.8F on 9/23  - Infection w/u unremarkable thus far  - No fevers since 9/23, Cefepime stopped on 9/25 and back on oral ppx LVQ.  - LVQ stopped 10/10 with ANC >1000  RESOLVED    Insomnia  - states melatonin does not work  - started on scheduled trazodone 10/8    History of Clostridioides difficile infection  - Was treated empirically for c-diff with oral vanc prior to admission.  - Continue oral vanc ppx per transplant protocol      Neuropathy  - Continue home gabapentin    Pancytopenia due to antineoplastic chemotherapy  - Daily CBC while inpatient  - Transfuse  for hgb <7 Plt  or <10K  - Continue acyclovir and posaconazole; levaquin stopped 10/10 with ANC >1000  - Holding cilostazol for plts < 50K  ANC 2930 today    Myelodysplastic syndrome  From Dr Hickey's clinic note 8/5:  Stem cell transplant candidate: We discussed the role for allogeneic stem cell transplantation in this disease process as a potentially curative option. We had an extensive discussion about the rationale, logistics, risks, and benefits. We reviewed the requirement to stay in the [place] area for 100 days with a caregiver at all times. We discussed the risks, including infection, graft failure, organ toxicity, graft versus host disease, relapse of disease, and secondary cancers. We reviewed the need for long-term immunosuppression and need for close monitoring. HCT-CI of 1 (intermediate risk). We had a prolonged discussion today regarding his recent deconditioning, Cdiff, and underlying disease. He is now much improved since last seen, and is improving his strength since starting to work with PT. Diarrhea now controlled. We discussed that our plan to move forward with the transplant process.   Coordinator: Fay Stephens  Regimen:  + 2Gy TBI  Donor: son (haplo)   Graft source: BM  - Admitted 9/13/24 for a  TBI PT Cy haplo (son) allogeneic BMT    Hypertension, essential  - Was holding home Coreg for fluid responsive hypotension. Resumed 9/30.    Coronary artery disease involving native coronary artery of native heart without angina pectoris  - Was holding home Coreg for hypotension. Resumed 9/30.    Physical debility  - PT/OT following while inpatient  - recommending HH with rolling walker  - The mobility limitation cannot be sufficiently resolved by the use of a cane. Patient's functional mobility deficit can be sufficiently resolved with the use of a Rolling Walker. Patient's mobility limitation significantly impairs their ability to participate in one of more activities of daily  living.  The use of a Rolling Walker, Walker will significantly improve the patient's ability to participate in MRADLS and the patient will use it on regular basis in the home.        VTE Risk Mitigation (From admission, onward)           Ordered     heparin, porcine (PF) 100 unit/mL injection flush 300 Units  As needed (PRN)         09/13/24 8441                    Disposition: ADITI Smith MD  Bone Marrow Transplant  [facility] - Oncology ([facility])

## 2024-10-13 NOTE — ASSESSMENT & PLAN NOTE
- Daily CBC while inpatient  - Transfuse for hgb <7 Plt  or <10K  - Continue acyclovir and posaconazole; levaquin stopped 10/10 with ANC >1000  - Holding cilostazol for plts < 50K  ANC 2930 today

## 2024-10-13 NOTE — SUBJECTIVE & OBJECTIVE
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 Engrafted D+20.  ANC 2930 today, stop G-CSF. Diarrhea persists and worsening. Checking C diff and stool studies. If negative, plan to schedule imodium.     Objective:     Vital Signs (Most Recent):  Temp: 98.4 °F (36.9 °C) (10/13/24 1128)  Pulse: 86 (10/13/24 1128)  Resp: 20 (10/13/24 1128)  BP: 133/62 (10/13/24 1128)  SpO2: (!) 94 % (10/13/24 1128) Vital Signs (24h Range):  Temp:  [97 °F (36.1 °C)-98.8 °F (37.1 °C)] 98.4 °F (36.9 °C)  Pulse:  [] 86  Resp:  [17-20] 20  SpO2:  [93 %-95 %] 94 %  BP: (126-137)/(62-74) 133/62     Weight: 70 kg (154 lb 5.2 oz)  Body mass index is 22.79 kg/m².  Body surface area is 1.85 meters squared.    ECOG SCORE           [unfilled]    Intake/Output - Last 3 Shifts         10/11 0700  10/12 0659 10/12 0700  10/13 0659 10/13 0700  10/14 0659    P.O. 930 590 120    I.V. (mL/kg)  149.9 (2.1)     Blood 645.6      Total Intake(mL/kg) 1575.6 (21.5) 739.9 (10.6) 120 (1.7)    Urine (mL/kg/hr) 600 (0.3) 1275 (0.8) 325 (0.6)    Emesis/NG output       Stool 0 0 0    Total Output 600 1275 325    Net +975.6 -535.1 -205           Urine Occurrence 3 x      Stool Occurrence 5 x 7 x              Physical Exam  Vitals and nursing note reviewed.   Constitutional:       Appearance: Normal appearance. He is well-developed.      Comments: Frail    HENT:      Head: Normocephalic and atraumatic.      Nose: Nose normal.      Mouth/Throat:      Mouth: Mucous membranes are dry.      Pharynx: No oropharyngeal exudate or posterior oropharyngeal erythema.   Eyes:      General:         Right eye: No discharge.         Left eye: No discharge.      Extraocular Movements: Extraocular movements intact.      Conjunctiva/sclera: Conjunctivae normal.   Cardiovascular:      Rate and Rhythm: Normal rate and regular rhythm.      Heart sounds: Normal heart sounds. No murmur heard.  Pulmonary:      Effort: Pulmonary effort is  normal. No respiratory distress.      Breath sounds: Normal breath sounds. No wheezing or rales.   Abdominal:      General: Bowel sounds are normal. There is no distension.      Palpations: Abdomen is soft.      Tenderness: There is no abdominal tenderness.   Musculoskeletal:         General: No deformity. Normal range of motion.      Cervical back: Normal range of motion and neck supple.      Right lower leg: No edema.      Left lower leg: No edema.   Skin:     General: Skin is warm and dry.      Findings: Bruising present. No erythema or rash.      Comments: Right chest wall vas cath. Dressing c/d/i. No sign of infection to site.   Neurological:      General: No focal deficit present.      Mental Status: He is alert and oriented to person, place, and time.   Psychiatric:         Mood and Affect: Mood normal.         Behavior: Behavior normal.         Thought Content: Thought content normal.         Judgment: Judgment normal.            Significant Labs:   CBC:   Recent Labs   Lab 10/12/24  0346 10/13/24  0324   WBC 2.42* 3.71*   HGB 7.2* 7.3*   HCT 21.1* 20.8*   PLT 22* 12*    and CMP:   Recent Labs   Lab 10/12/24  0346 10/13/24  0324    140   K 3.6 4.0    105   CO2 30* 28   * 107   BUN 10 9   CREATININE 0.7 0.8   CALCIUM 8.7 8.6*   PROT 5.2* 5.2*   ALBUMIN 2.8* 2.8*   BILITOT 0.5 0.5   ALKPHOS 55 58   AST 15 15   ALT 14 14   ANIONGAP 5* 7*       Diagnostic Results:  I have reviewed all pertinent imaging results/findings within the past 24 hours.

## 2024-10-13 NOTE — NURSING
Dr Smith notified stool  collected and sent but was not enough for ova and parasite and GI pathogen panel. MD notified a new order would have to be placed.

## 2024-10-13 NOTE — PLAN OF CARE
Side rails up x2; call bell in place; bed in lowest, locked position; skid proof socks on; no evidence of skin breakdown; care plan explained to patient; pt remains free of injury. Pt is day + 24 of an Haplo SCT. Pt with poor appetite, voids, water BM x 1, ambulates with walker and standby assist. 1 Liter NC in progress. Frquent rounding in progress, pt encouraged to call as needed, VSS and afebrile. Pt with C diff precautions in place. C diff stool sample sent. Mg rider infusing.

## 2024-10-13 NOTE — ASSESSMENT & PLAN NOTE
From Dr Hickey's clinic note 8/5:  Stem cell transplant candidate: We discussed the role for allogeneic stem cell transplantation in this disease process as a potentially curative option. We had an extensive discussion about the rationale, logistics, risks, and benefits. We reviewed the requirement to stay in the [place] area for 100 days with a caregiver at all times. We discussed the risks, including infection, graft failure, organ toxicity, graft versus host disease, relapse of disease, and secondary cancers. We reviewed the need for long-term immunosuppression and need for close monitoring. HCT-CI of 1 (intermediate risk). We had a prolonged discussion today regarding his recent deconditioning, Cdiff, and underlying disease. He is now much improved since last seen, and is improving his strength since starting to work with PT. Diarrhea now controlled. We discussed that our plan to move forward with the transplant process.   Coordinator: Fay Stephens  Regimen:  + 2Gy TBI  Donor: son (haplo)   Graft source: BM  - Admitted 9/13/24 for a  TBI PT Cy haplo (son) allogeneic BMT

## 2024-10-14 LAB
ABO + RH BLD: NORMAL
ALBUMIN SERPL BCP-MCNC: 2.8 G/DL (ref 3.5–5.2)
ALP SERPL-CCNC: 61 U/L (ref 55–135)
ALT SERPL W/O P-5'-P-CCNC: 16 U/L (ref 10–44)
ANION GAP SERPL CALC-SCNC: 8 MMOL/L (ref 8–16)
ANISOCYTOSIS BLD QL SMEAR: SLIGHT
AST SERPL-CCNC: 18 U/L (ref 10–40)
ASTROVIRUS: NOT DETECTED
BASOPHILS # BLD AUTO: ABNORMAL K/UL (ref 0–0.2)
BASOPHILS NFR BLD: 0 % (ref 0–1.9)
BILIRUB SERPL-MCNC: 0.5 MG/DL (ref 0.1–1)
BLD GP AB SCN CELLS X3 SERPL QL: NORMAL
BLD PROD TYP BPU: NORMAL
BLOOD UNIT EXPIRATION DATE: NORMAL
BLOOD UNIT TYPE CODE: 7300
BLOOD UNIT TYPE: NORMAL
BUN SERPL-MCNC: 9 MG/DL (ref 8–23)
C COLI+JEJ+UPSA DNA STL QL NAA+NON-PROBE: NOT DETECTED
C DIFF GDH STL QL: NEGATIVE
C DIFF TOX A+B STL QL IA: NEGATIVE
CALCIUM SERPL-MCNC: 8.5 MG/DL (ref 8.7–10.5)
CHLORIDE SERPL-SCNC: 104 MMOL/L (ref 95–110)
CMV DNA SPEC QL NAA+PROBE: NORMAL
CO2 SERPL-SCNC: 27 MMOL/L (ref 23–29)
CODING SYSTEM: NORMAL
CREAT SERPL-MCNC: 0.7 MG/DL (ref 0.5–1.4)
CROSSMATCH INTERPRETATION: NORMAL
CRYPTOSP AG STL QL IA: NEGATIVE
CYCLOSPORA CAYETANENSIS: NOT DETECTED
CYTOMEGALOVIRUS PCR, QUANT: NOT DETECTED IU/ML
DIFFERENTIAL METHOD BLD: ABNORMAL
DISPENSE STATUS: NORMAL
DOHLE BOD BLD QL SMEAR: PRESENT
ENTEROAGGREGATIVE E COLI: NOT DETECTED
ENTEROPATHOGENIC E COLI: NOT DETECTED
EOSINOPHIL # BLD AUTO: ABNORMAL K/UL (ref 0–0.5)
EOSINOPHIL NFR BLD: 0 % (ref 0–8)
EPSTEIN-BARR VIRUS DNA: NORMAL
EPSTEIN-BARR VIRUS PCR, QUANT: NOT DETECTED IU/ML
ERYTHROCYTE [DISTWIDTH] IN BLOOD BY AUTOMATED COUNT: 12.9 % (ref 11.5–14.5)
EST. GFR  (NO RACE VARIABLE): >60 ML/MIN/1.73 M^2
G LAMBLIA AG STL QL IA: NEGATIVE
GLUCOSE SERPL-MCNC: 110 MG/DL (ref 70–110)
GPP - ADENOVIRUS 40/41: NOT DETECTED
GPP - CRYPTOSPORIDIUM: NOT DETECTED
GPP - ENTAMOEBA HISTOLYTICA: NOT DETECTED
GPP - ENTEROTOXIGENIC E COLI (ETEC): NOT DETECTED
GPP - GIARDIA LAMBLIA: NOT DETECTED
GPP - NOROVIRUS GI/GII: NOT DETECTED
GPP - ROTAVIRUS A: NOT DETECTED
GPP - SALMONELLA: NOT DETECTED
GPP - VIBRIO CHOLERA: NOT DETECTED
GPP - YERSINIA ENTEROCOLITICA: NOT DETECTED
HCT VFR BLD AUTO: 20.7 % (ref 40–54)
HGB BLD-MCNC: 7.1 G/DL (ref 14–18)
HYPOCHROMIA BLD QL SMEAR: ABNORMAL
IMM GRANULOCYTES # BLD AUTO: ABNORMAL K/UL (ref 0–0.04)
IMM GRANULOCYTES NFR BLD AUTO: ABNORMAL % (ref 0–0.5)
LACTATE PLASV-SCNC: NOT DETECTED MMOL/L
LYMPHOCYTES # BLD AUTO: ABNORMAL K/UL (ref 1–4.8)
LYMPHOCYTES NFR BLD: 1 % (ref 18–48)
MAGNESIUM SERPL-MCNC: 1.9 MG/DL (ref 1.6–2.6)
MCH RBC QN AUTO: 29.8 PG (ref 27–31)
MCHC RBC AUTO-ENTMCNC: 34.3 G/DL (ref 32–36)
MCV RBC AUTO: 87 FL (ref 82–98)
MONOCYTES # BLD AUTO: ABNORMAL K/UL (ref 0.3–1)
MONOCYTES NFR BLD: 6 % (ref 4–15)
NEUTROPHILS NFR BLD: 91 % (ref 38–73)
NEUTS BAND NFR BLD MANUAL: 2 %
NRBC BLD-RTO: 0 /100 WBC
OVALOCYTES BLD QL SMEAR: ABNORMAL
PHOSPHATE SERPL-MCNC: 3.3 MG/DL (ref 2.7–4.5)
PLATELET # BLD AUTO: 10 K/UL (ref 150–450)
PLESIOMONAS SHIGELLOIDES: NOT DETECTED
PMV BLD AUTO: 11.5 FL (ref 9.2–12.9)
POIKILOCYTOSIS BLD QL SMEAR: SLIGHT
POLYCHROMASIA BLD QL SMEAR: ABNORMAL
POTASSIUM SERPL-SCNC: 3.9 MMOL/L (ref 3.5–5.1)
PROT SERPL-MCNC: 5.3 G/DL (ref 6–8.4)
RBC # BLD AUTO: 2.38 M/UL (ref 4.6–6.2)
RV AG STL QL IA.RAPID: NEGATIVE
SAPOVIRUS: NOT DETECTED
SHIGELLA SP+EIEC IPAH STL QL NAA+PROBE: NOT DETECTED
SODIUM SERPL-SCNC: 139 MMOL/L (ref 136–145)
SPECIMEN OUTDATE: NORMAL
SPHEROCYTES BLD QL SMEAR: ABNORMAL
TACROLIMUS BLD-MCNC: 9.6 NG/ML (ref 5–15)
TOXIC GRANULES BLD QL SMEAR: PRESENT
UNIT NUMBER: NORMAL
VIBRIO: NOT DETECTED
WBC # BLD AUTO: 4.06 K/UL (ref 3.9–12.7)

## 2024-10-14 PROCEDURE — 25000003 PHARM REV CODE 250: Performed by: STUDENT IN AN ORGANIZED HEALTH CARE EDUCATION/TRAINING PROGRAM

## 2024-10-14 PROCEDURE — 97110 THERAPEUTIC EXERCISES: CPT | Mod: CO

## 2024-10-14 PROCEDURE — 63600175 PHARM REV CODE 636 W HCPCS: Performed by: NURSE PRACTITIONER

## 2024-10-14 PROCEDURE — 86901 BLOOD TYPING SEROLOGIC RH(D): CPT | Performed by: INTERNAL MEDICINE

## 2024-10-14 PROCEDURE — 80053 COMPREHEN METABOLIC PANEL: CPT | Performed by: NURSE PRACTITIONER

## 2024-10-14 PROCEDURE — 83735 ASSAY OF MAGNESIUM: CPT | Performed by: NURSE PRACTITIONER

## 2024-10-14 PROCEDURE — 20600001 HC STEP DOWN PRIVATE ROOM

## 2024-10-14 PROCEDURE — 80197 ASSAY OF TACROLIMUS: CPT | Performed by: INTERNAL MEDICINE

## 2024-10-14 PROCEDURE — 25000003 PHARM REV CODE 250: Performed by: NURSE PRACTITIONER

## 2024-10-14 PROCEDURE — 86850 RBC ANTIBODY SCREEN: CPT | Performed by: INTERNAL MEDICINE

## 2024-10-14 PROCEDURE — 25000003 PHARM REV CODE 250: Performed by: INTERNAL MEDICINE

## 2024-10-14 PROCEDURE — 97535 SELF CARE MNGMENT TRAINING: CPT | Mod: CO

## 2024-10-14 PROCEDURE — P9073 PLATELETS PHERESIS PATH REDU: HCPCS | Performed by: FAMILY MEDICINE

## 2024-10-14 PROCEDURE — 84100 ASSAY OF PHOSPHORUS: CPT | Performed by: NURSE PRACTITIONER

## 2024-10-14 PROCEDURE — P9073 PLATELETS PHERESIS PATH REDU: HCPCS | Mod: 91 | Performed by: FAMILY MEDICINE

## 2024-10-14 PROCEDURE — 85027 COMPLETE CBC AUTOMATED: CPT | Performed by: NURSE PRACTITIONER

## 2024-10-14 PROCEDURE — 85007 BL SMEAR W/DIFF WBC COUNT: CPT | Performed by: NURSE PRACTITIONER

## 2024-10-14 PROCEDURE — 86900 BLOOD TYPING SEROLOGIC ABO: CPT | Performed by: INTERNAL MEDICINE

## 2024-10-14 RX ORDER — HYDROCODONE BITARTRATE AND ACETAMINOPHEN 500; 5 MG/1; MG/1
TABLET ORAL
Status: DISCONTINUED | OUTPATIENT
Start: 2024-10-14 | End: 2024-10-16 | Stop reason: HOSPADM

## 2024-10-14 RX ORDER — FUROSEMIDE 10 MG/ML
40 INJECTION INTRAMUSCULAR; INTRAVENOUS ONCE
Status: COMPLETED | OUTPATIENT
Start: 2024-10-14 | End: 2024-10-14

## 2024-10-14 RX ADMIN — VANCOMYCIN HYDROCHLORIDE 125 MG: KIT at 08:10

## 2024-10-14 RX ADMIN — MYCOPHENOLATE MOFETIL 1000 MG: 200 POWDER, FOR SUSPENSION ORAL at 08:10

## 2024-10-14 RX ADMIN — PROCHLORPERAZINE EDISYLATE 5 MG: 5 INJECTION INTRAMUSCULAR; INTRAVENOUS at 11:10

## 2024-10-14 RX ADMIN — LIDOCAINE 5% 1 PATCH: 700 PATCH TOPICAL at 10:10

## 2024-10-14 RX ADMIN — PROCHLORPERAZINE EDISYLATE 5 MG: 5 INJECTION INTRAMUSCULAR; INTRAVENOUS at 05:10

## 2024-10-14 RX ADMIN — TRAZODONE HYDROCHLORIDE 50 MG: 50 TABLET ORAL at 08:10

## 2024-10-14 RX ADMIN — GABAPENTIN 600 MG: 250 SOLUTION ORAL at 08:10

## 2024-10-14 RX ADMIN — Medication 1 DOSE: at 08:10

## 2024-10-14 RX ADMIN — CHOLESTYRAMINE 4 G: 4 POWDER, FOR SUSPENSION ORAL at 08:10

## 2024-10-14 RX ADMIN — HYDROCORTISONE: 25 CREAM TOPICAL at 08:10

## 2024-10-14 RX ADMIN — CARVEDILOL 6.25 MG: 6.25 TABLET, FILM COATED ORAL at 08:10

## 2024-10-14 RX ADMIN — URSODIOL 300 MG: 300 CAPSULE ORAL at 08:10

## 2024-10-14 RX ADMIN — OLANZAPINE 5 MG: 2.5 TABLET, FILM COATED ORAL at 08:10

## 2024-10-14 RX ADMIN — PANTOPRAZOLE SODIUM 40 MG: 40 TABLET, DELAYED RELEASE ORAL at 08:10

## 2024-10-14 RX ADMIN — TACROLIMUS 1 MG: 1 CAPSULE ORAL at 05:10

## 2024-10-14 RX ADMIN — ONDANSETRON 8 MG: 8 TABLET, ORALLY DISINTEGRATING ORAL at 03:10

## 2024-10-14 RX ADMIN — POSACONAZOLE 300 MG: 100 TABLET, DELAYED RELEASE ORAL at 08:10

## 2024-10-14 RX ADMIN — Medication 1 DOSE: at 01:10

## 2024-10-14 RX ADMIN — MYCOPHENOLATE MOFETIL 1000 MG: 200 POWDER, FOR SUSPENSION ORAL at 03:10

## 2024-10-14 RX ADMIN — ONDANSETRON 8 MG: 8 TABLET, ORALLY DISINTEGRATING ORAL at 05:10

## 2024-10-14 RX ADMIN — FUROSEMIDE 40 MG: 10 INJECTION, SOLUTION INTRAMUSCULAR; INTRAVENOUS at 11:10

## 2024-10-14 RX ADMIN — Medication: at 08:10

## 2024-10-14 RX ADMIN — ACYCLOVIR 800 MG: 200 SUSPENSION ORAL at 08:10

## 2024-10-14 RX ADMIN — LOPERAMIDE HYDROCHLORIDE 2 MG: 2 CAPSULE ORAL at 06:10

## 2024-10-14 RX ADMIN — LETERMOVIR 480 MG: 480 TABLET, FILM COATED ORAL at 08:10

## 2024-10-14 RX ADMIN — LOPERAMIDE HYDROCHLORIDE 2 MG: 2 CAPSULE ORAL at 08:10

## 2024-10-14 RX ADMIN — Medication 1 DOSE: at 05:10

## 2024-10-14 RX ADMIN — TACROLIMUS 0.5 MG: 0.5 CAPSULE ORAL at 08:10

## 2024-10-14 RX ADMIN — ONDANSETRON 8 MG: 8 TABLET, ORALLY DISINTEGRATING ORAL at 08:10

## 2024-10-14 NOTE — PLAN OF CARE
Pt is day +25 FluMel Haplo SCT. Went over POC with pt at beginning of shift.  Questions were asked and answered.  AAO x 4.  Afebrile. 1 unit plts given. O2 at 1L/min via NC, stats 93-96%. Up with 1 person assist. Voiding without difficulty. Cdiff (-). Watery BM*3, imodium given. Ova and parasite and GI pathogen panel collected. No complaints of pain. Pt remained free from injury with bed in low locked position, call light with in reach, bed alarm on, non-skid socks, and frequent rounding.  Pt instructed to call for assistance as needed. Denies needs at this time.

## 2024-10-14 NOTE — NURSING
Home Oxygen Evaluation    Date Performed: 10/14/2024    1) Patient's Home O2 Sat on room air, while at rest: 95%        If O2 sats on room air at rest are 88% or below, patient qualifies. No additional testing needed. Document N/A in steps 2 and 3. If 89% or above, complete steps 2.      2) Patient's O2 Sat on room air while exercisin%        If O2 sats on room air while exercising remain 89% or above patient does not qualify, no further testing needed Document N/A in step 3. If O2 sats on room air while exercising are 88% or below, continue to step 3.    3) Patient's O2 Sat while exercising on O2: n/a

## 2024-10-14 NOTE — PT/OT/SLP PROGRESS
"Occupational Therapy   Treatment    Name: Guillaume Salinas  MRN: [id number]  Admitting Diagnosis:  S/P allogeneic bone marrow transplant       Recommendations:     Discharge Recommendations: Low Intensity Therapy  Discharge Equipment Recommendations:  walker, rolling  Barriers to discharge:  None    Assessment:     Guillaume Salinas is a 69 y.o. male with a medical diagnosis of S/P allogeneic bone marrow transplant.  He presents with c/o fatigue and weakness. Performance deficits affecting function are weakness, impaired endurance, impaired self care skills, gait instability, impaired functional mobility, impaired balance, pain, decreased upper extremity function, decreased lower extremity function. Wife providing interpretation during session per patient preference deferring Language Line services. Patient limited by deconditioning and fatigue but remained motivated and participative throughout OT intervention. Patient received on room air and remained within 94-96% SpO2 during session. Goals remain appropriate at this time.     Rehab Prognosis:  Good; patient would benefit from acute skilled OT services to address these deficits and reach maximum level of function.       Plan:     Patient to be seen 2 x/week to address the above listed problems via self-care/home management, therapeutic activities, therapeutic exercises, neuromuscular re-education  Plan of Care Expires: 10/15/24  Plan of Care Reviewed with: patient, spouse    Subjective     Chief Complaint: "It hurts right here." Patient pointed to upper traps both sides  Patient/Family Comments/goals: wife provided encouragement and eager to take patient home  Pain/Comfort:  Pain Rating 1:  (unrated)  Location - Side 1: Bilateral  Location - Orientation 1: upper  Location 1: back  Pain Addressed 1: Reposition  Pain Rating Post-Intervention 1: 0/10    Objective:     Communicated with: nurse dickinson prior to session.  Patient found up in chair with " central line upon OT entry to room.    General Precautions: Standard, fall    Orthopedic Precautions:N/A  Braces: N/A  Respiratory Status: Room air     Occupational Performance:     Bed Mobility:    Patient received OOB    Functional Mobility/Transfers:  Patient completed Sit <> Stand Transfer with stand by assistance  with  rolling walker   Patient completed Chair Transfer using Step Transfer technique with stand by assistance with rolling walker  Functional Mobility: within room 40 ft x2 using RW with SBA intermittent CGA    Activities of Daily Living:  Grooming: stand by assistance oral and facial hygiene standing sink side using RW      AMPAC 6 Click ADL: 19    Treatment & Education:  Patient participated in the following therapeutic exercise with focus on improving muscular endurance and restoring muscular strength required for increased activity tolerance and (I) during ADL tasks.  Seated B straight arm raises 2 sets x 10 reps  Standing single leg march using RW 2 sets x 10 reps each LE  Standing contralateral reach with trunk rotation 2 sets x 10 reps each UE  X2 min on recumbent bike level 1 resistance (deferred further 2/2 R hip pain reported)  Addressed all patient questions/concerns within [facility] scope of practice.    Patient left up in chair with call button in reach and nurse notified    GOALS:   Multidisciplinary Problems       Occupational Therapy Goals          Problem: Occupational Therapy    Goal Priority Disciplines Outcome Interventions   Occupational Therapy Goal     OT, PT/OT Progressing    Description: Goals to be met by: 10/29/2024     Patient will increase functional independence with ADLs by performing:    Pt will demonstrate independence with grooming x 3 sessions.  Pt will demonstrate independence with UE dressing x 3 sessions.  Pt will demonstrate independence with LE dressing x 3 sessions.  Pt will demonstrate independence with Toileting x 3 sessions.  Pt will demonstrate independence  with transfers x 3 sessions.  Pt will complete functional mobility to simulate community distances with [place] x 3 sessions in order to maximize functional activity tolerance required for occupations of choice.   Pt will demonstrate independence with HEP for UE strengthening/endurance with independence.                           Time Tracking:     OT Date of Treatment: 10/14/24  OT Start Time: 1021  OT Stop Time: 1059  OT Total Time (min): 38 min    Billable Minutes:Self Care/Home Management 13  Therapeutic Exercise 25    OT/DIMITRIS: DIMITRIS     Number of DIMITRIS visits since last OT visit: 2    10/14/2024

## 2024-10-14 NOTE — PLAN OF CARE
Plan care reviewed with patient and family.  Day +25 of Haplo stem cell transplant.  Patient is awake, alert and oriented x4. Wife a the bedside. Fall precautions maintained, side rails upx2, call light in reach , be in low position and locked, nonskid socks on.  Had bowel movement this morning.  Wife at the bedside.  Received [id number] today.  No complaints voiced.

## 2024-10-14 NOTE — SUBJECTIVE & OBJECTIVE
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 Afebrile. VSS. Engrafted D+20. ANC 3775 today. Continues to have loose watery stool and has been started on imodium as C diff is negative. Stool studies pending. Platelet 10 today. 1 unit of platelets ordered. Continue supportive care.     Objective:     Vital Signs (Most Recent):  Temp: 98.3 °F (36.8 °C) (10/14/24 0600)  Pulse: 83 (10/14/24 0600)  Resp: 18 (10/14/24 0600)  BP: 131/61 (10/14/24 0600)  SpO2: 96 % (10/14/24 0600) Vital Signs (24h Range):  Temp:  [96.7 °F (35.9 °C)-98.5 °F (36.9 °C)] 98.3 °F (36.8 °C)  Pulse:  [82-99] 83  Resp:  [16-20] 18  SpO2:  [89 %-96 %] 96 %  BP: (112-137)/(55-65) 131/61     Weight: 69.3 kg (152 lb 12.5 oz)  Body mass index is 22.56 kg/m².  Body surface area is 1.84 meters squared.    ECOG SCORE           [unfilled]    Intake/Output - Last 3 Shifts         10/12 0700  10/13 0659 10/13 0700  10/14 0659 10/14 0700  10/15 0659    P.O. 590 320     I.V. (mL/kg) 149.9 (2.1)      Blood  352     Total Intake(mL/kg) 739.9 (10.6) 672 (9.7)     Urine (mL/kg/hr) 1275 (0.8) 825 (0.5)     Stool 0 0     Total Output 1275 825     Net -535.1 -153            Stool Occurrence 7 x 1 x              Physical Exam  Vitals and nursing note reviewed.   Constitutional:       Appearance: Normal appearance. He is well-developed.      Comments: Frail    HENT:      Head: Normocephalic and atraumatic.      Nose: Nose normal.      Mouth/Throat:      Mouth: Mucous membranes are dry.      Pharynx: No oropharyngeal exudate or posterior oropharyngeal erythema.   Eyes:      General:         Right eye: No discharge.         Left eye: No discharge.      Extraocular Movements: Extraocular movements intact.      Conjunctiva/sclera: Conjunctivae normal.   Cardiovascular:      Rate and Rhythm: Normal rate and regular rhythm.      Heart sounds: Normal heart sounds. No murmur heard.  Pulmonary:      Effort: Pulmonary effort is  normal. No respiratory distress.      Breath sounds: Normal breath sounds. No wheezing or rales.   Abdominal:      General: Bowel sounds are normal. There is no distension.      Palpations: Abdomen is soft.      Tenderness: There is no abdominal tenderness.   Musculoskeletal:         General: No deformity. Normal range of motion.      Cervical back: Normal range of motion and neck supple.      Right lower leg: No edema.      Left lower leg: No edema.   Skin:     General: Skin is warm and dry.      Findings: Bruising present. No erythema or rash.      Comments: Right chest wall vas cath. Dressing c/d/i. No sign of infection to site.   Neurological:      General: No focal deficit present.      Mental Status: He is alert and oriented to person, place, and time.   Psychiatric:         Mood and Affect: Mood normal.         Behavior: Behavior normal.         Thought Content: Thought content normal.         Judgment: Judgment normal.            Significant Labs:   All pertinent labs from the last 24 hours have been reviewed.    Diagnostic Results:  I have reviewed and interpreted all pertinent imaging results/findings within the past 24 hours.

## 2024-10-14 NOTE — PROGRESS NOTES
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 Afebrile. VSS. Engrafted D+20. ANC 3775 today. Continues to have loose watery stool and has been started on imodium as C diff is negative. Stool studies pending. Platelet 10 today. 1 unit of platelets ordered. Continue supportive care.     Objective:     Vital Signs (Most Recent):  Temp: 98.3 °F (36.8 °C) (10/14/24 0600)  Pulse: 83 (10/14/24 0600)  Resp: 18 (10/14/24 0600)  BP: 131/61 (10/14/24 0600)  SpO2: 96 % (10/14/24 0600) Vital Signs (24h Range):  Temp:  [96.7 °F (35.9 °C)-98.5 °F (36.9 °C)] 98.3 °F (36.8 °C)  Pulse:  [82-99] 83  Resp:  [16-20] 18  SpO2:  [89 %-96 %] 96 %  BP: (112-137)/(55-65) 131/61     Weight: 69.3 kg (152 lb 12.5 oz)  Body mass index is 22.56 kg/m².  Body surface area is 1.84 meters squared.    ECOG SCORE           [unfilled]    Intake/Output - Last 3 Shifts         10/12 0700  10/13 0659 10/13 0700  10/14 0659 10/14 0700  10/15 0659    P.O. 590 320     I.V. (mL/kg) 149.9 (2.1)      Blood  352     Total Intake(mL/kg) 739.9 (10.6) 672 (9.7)     Urine (mL/kg/hr) 1275 (0.8) 825 (0.5)     Stool 0 0     Total Output 1275 825     Net -535.1 -153            Stool Occurrence 7 x 1 x              Physical Exam  Vitals and nursing note reviewed.   Constitutional:       Appearance: Normal appearance. He is well-developed.      Comments: Frail    HENT:      Head: Normocephalic and atraumatic.      Nose: Nose normal.      Mouth/Throat:      Mouth: Mucous membranes are dry.      Pharynx: No oropharyngeal exudate or posterior oropharyngeal erythema.   Eyes:      General:         Right eye: No discharge.         Left eye: No discharge.      Extraocular Movements: Extraocular movements intact.       Conjunctiva/sclera: Conjunctivae normal.   Cardiovascular:      Rate and Rhythm: Normal rate and regular rhythm.      Heart sounds: Normal heart sounds. No murmur heard.  Pulmonary:      Effort: Pulmonary effort is normal. No respiratory distress.      Breath sounds: Normal breath sounds. No wheezing or rales.   Abdominal:      General: Bowel sounds are normal. There is no distension.      Palpations: Abdomen is soft.      Tenderness: There is no abdominal tenderness.   Musculoskeletal:         General: No deformity. Normal range of motion.      Cervical back: Normal range of motion and neck supple.      Right lower leg: No edema.      Left lower leg: No edema.   Skin:     General: Skin is warm and dry.      Findings: Bruising present. No erythema or rash.      Comments: Right chest wall vas cath. Dressing c/d/i. No sign of infection to site.   Neurological:      General: No focal deficit present.      Mental Status: He is alert and oriented to person, place, and time.   Psychiatric:         Mood and Affect: Mood normal.         Behavior: Behavior normal.         Thought Content: Thought content normal.         Judgment: Judgment normal.            Significant Labs:   All pertinent labs from the last 24 hours have been reviewed.    Diagnostic Results:  I have reviewed and interpreted all pertinent imaging results/findings within the past 24 hours.  Assessment/Plan:     * S/P allogeneic bone marrow transplant  - Patient of Dr. 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 Today is Day +24  Engrafted D+20 (10/9) with ANC of 531  - ANC today 1162  - Tacro level 8.3 on 10/11; continue dose of 0.5mg AM and 1mg PM.   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 None noted thus far.  - Patient is B+. Donor is A+.  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 Patient will start letermovir ppx on 9/24/24.  - Received fresh BMT product on 9/19/24 with a CD34 dose of  3.55 x10^6.  - Of note, cilostazol may interact with tacro. (Currently on hold for plts < 50K).  - See treatment plan below:    Planned conditioning regimen:  Fludarabine on Day -5, -4, -3, and -2  Melphalan on Day -2  TBI on Day -1     Planned  GVHD Prophylaxis:  Cyclophosphamide (with Mesna) on Days +3 and +4  Mycophenolate starting on Day +5  Tacrolimus starting on Day +5     Antimicrobial Prophylaxis:  Acyclovir starting on Day -5  Levofloxacin starting on Day -1  Micafungin on Day -1 through Day +4  Letermovir starting on Day +5 (if CMV PCR drawn on day 0 results negative)  Posaconazole starting on Day +5  Bactrim starting on Day +30     SOS/VOD Prophylaxis:  Ursodiol on Day -5 through Day +30      Growth Factor Support:  Neupogen starting on Day +5, stopped D+24. Engrafted D+20     Caregiver: wife   Post-transplant discharge plans: home    Hypomagnesemia  - See electrolyte disorder  - replace PRN  - Tacro likely contributing  - will need scheduled magnesium on discharge (currently on 800mg BID)    Electrolyte disorder  - Replete per PRN electrolyte order set  - Daily CMP, mag, and phos while inpatient    Moderate malnutrition  Nutrition consulted. Most recent weight and BMI monitored-     Measurements:  Wt Readings from Last 1 Encounters:   10/08/24 76 kg (167 lb 7 oz)   Body mass index is 24.73 kg/m².    Patient has been screened and assessed by RD.    - Switched boost plus to boost breeze as diary of plus making patient nauseous  - PO intake as tolerated; on scheduled antiemetics for nausea  - stopped IVF with engraftment; encouraged increased PO intake in order to DC    Dry mouth  - continue to moisten lips with chapstick  - encouraged use of oral hygiene rinses to sterilize mouth and stimulate saliva production  - consider speech consult on 10/11 if patient still reporting difficulties swallowing    Urinary frequency  - reports increased nightly frequency and urgency with low output  - no formal BPH  diagnosis; started flomax 9/27  - improved; stopped on 10/10    Chemotherapy-induced nausea  - scheduled zofran IV 8mg q8h on 9/27  - added scheduled compazine 9/30  - added 5mg zyprexa at night 10/8  - has prn ativan  - antiemetics now PO      Hemorrhoids  - d/t chemo induced diarrhea  - has anusol, tucks pads, and LETS gel  - patient denies any bleeding at site  - has PRN morphine for pain control  - hemorrhoid pain improving with diarrhea control    Headache  - C/o headache 9/25  - Daily coffee drinker. Improved after drinking coffee.  - Suspect caffeine withdrawal.  - Can start caffeine tablet if patient is unable to drink coffee due to chemo side effects  - none today    Chemotherapy induced diarrhea  - C-diff neg 9/24  - Of note, was treated empirically for c-diff with oral vanc prior to admission and is receiving oral vanc ppx while admitted.  - Receiving scheduled imodium and PRN lomotil  - Started Questran 10/1.  - Diarrhea improved, so changed imodium from scheduled to PRN    Neutropenic fever  - First fever with tmax 101.8F on 9/23  - Infection w/u unremarkable thus far  - No fevers since 9/23, Cefepime stopped on 9/25 and back on oral ppx LVQ.  - LVQ stopped 10/10 with ANC >1000  RESOLVED    Insomnia  - states melatonin does not work  - started on scheduled trazodone 10/8    History of Clostridioides difficile infection  - Was treated empirically for c-diff with oral vanc prior to admission.  - Continue oral vanc ppx per transplant protocol      Neuropathy  - Continue home gabapentin    Pancytopenia due to antineoplastic chemotherapy  - Daily CBC while inpatient  - Transfuse for hgb <7 Plt  or <10K  - Continue acyclovir and posaconazole; levaquin stopped 10/10 with ANC >1000  - Holding cilostazol for plts < 50K  ANC 2930 today    Myelodysplastic syndrome  From Dr Hickey's clinic note 8/5:  Stem cell transplant candidate: We discussed the role for allogeneic stem cell transplantation in this disease  process as a potentially curative option. We had an extensive discussion about the rationale, logistics, risks, and benefits. We reviewed the requirement to stay in the [place] area for 100 days with a caregiver at all times. We discussed the risks, including infection, graft failure, organ toxicity, graft versus host disease, relapse of disease, and secondary cancers. We reviewed the need for long-term immunosuppression and need for close monitoring. HCT-CI of 1 (intermediate risk). We had a prolonged discussion today regarding his recent deconditioning, Cdiff, and underlying disease. He is now much improved since last seen, and is improving his strength since starting to work with PT. Diarrhea now controlled. We discussed that our plan to move forward with the transplant process.   Coordinator: Fay Stephens  Regimen:  + 2Gy TBI  Donor: son (haplo)   Graft source: BM  - Admitted 9/13/24 for a  TBI PT Cy haplo (son) allogeneic BMT    Hypertension, essential  - Was holding home Coreg for fluid responsive hypotension. Resumed 9/30.    Coronary artery disease involving native coronary artery of native heart without angina pectoris  - Was holding home Coreg for hypotension. Resumed 9/30.    Physical debility  - PT/OT following while inpatient  - recommending HH with rolling walker  - The mobility limitation cannot be sufficiently resolved by the use of a cane. Patient's functional mobility deficit can be sufficiently resolved with the use of a Rolling Walker. Patient's mobility limitation significantly impairs their ability to participate in one of more activities of daily living.  The use of a Rolling Walker, Walker will significantly improve the patient's ability to participate in MRADLS and the patient will use it on regular basis in the home.        VTE Risk Mitigation (From admission, onward)           Ordered     heparin, porcine (PF) 100 unit/mL injection flush 300 Units  As needed (PRN)          09/13/24 0077                    Disposition: ADITI Baig MD  Bone Marrow Transplant  [facility] - Oncology ([facility])

## 2024-10-15 LAB
ALBUMIN SERPL BCP-MCNC: 2.9 G/DL (ref 3.5–5.2)
ALP SERPL-CCNC: 61 U/L (ref 55–135)
ALT SERPL W/O P-5'-P-CCNC: 16 U/L (ref 10–44)
ANION GAP SERPL CALC-SCNC: 6 MMOL/L (ref 8–16)
ANISOCYTOSIS BLD QL SMEAR: SLIGHT
AST SERPL-CCNC: 18 U/L (ref 10–40)
BASO STIPL BLD QL SMEAR: ABNORMAL
BASOPHILS NFR BLD: 0 % (ref 0–1.9)
BILIRUB SERPL-MCNC: 0.4 MG/DL (ref 0.1–1)
BUN SERPL-MCNC: 10 MG/DL (ref 8–23)
CALCIUM SERPL-MCNC: 8.6 MG/DL (ref 8.7–10.5)
CHLORIDE SERPL-SCNC: 106 MMOL/L (ref 95–110)
CO2 SERPL-SCNC: 27 MMOL/L (ref 23–29)
CREAT SERPL-MCNC: 0.8 MG/DL (ref 0.5–1.4)
DIFFERENTIAL METHOD BLD: ABNORMAL
DOHLE BOD BLD QL SMEAR: PRESENT
EOSINOPHIL NFR BLD: 0 % (ref 0–8)
ERYTHROCYTE [DISTWIDTH] IN BLOOD BY AUTOMATED COUNT: 13 % (ref 11.5–14.5)
EST. GFR  (NO RACE VARIABLE): >60 ML/MIN/1.73 M^2
GLUCOSE SERPL-MCNC: 116 MG/DL (ref 70–110)
HCT VFR BLD AUTO: 20.1 % (ref 40–54)
HGB BLD-MCNC: 7 G/DL (ref 14–18)
IMM GRANULOCYTES # BLD AUTO: ABNORMAL K/UL (ref 0–0.04)
IMM GRANULOCYTES NFR BLD AUTO: ABNORMAL % (ref 0–0.5)
LYMPHOCYTES NFR BLD: 0.7 % (ref 18–48)
MAGNESIUM SERPL-MCNC: 1.5 MG/DL (ref 1.6–2.6)
MCH RBC QN AUTO: 30.2 PG (ref 27–31)
MCHC RBC AUTO-ENTMCNC: 34.8 G/DL (ref 32–36)
MCV RBC AUTO: 87 FL (ref 82–98)
METAMYELOCYTES NFR BLD MANUAL: 0.7 %
MONOCYTES NFR BLD: 28 % (ref 4–15)
NEUTROPHILS NFR BLD: 68.6 % (ref 38–73)
NEUTS BAND NFR BLD MANUAL: 2 %
NRBC BLD-RTO: 0 /100 WBC
PHOSPHATE SERPL-MCNC: 3.6 MG/DL (ref 2.7–4.5)
PLATELET # BLD AUTO: 19 K/UL (ref 150–450)
PLATELET BLD QL SMEAR: ABNORMAL
PMV BLD AUTO: 10.8 FL (ref 9.2–12.9)
POTASSIUM SERPL-SCNC: 3.3 MMOL/L (ref 3.5–5.1)
PROT SERPL-MCNC: 5.3 G/DL (ref 6–8.4)
RBC # BLD AUTO: 2.32 M/UL (ref 4.6–6.2)
SODIUM SERPL-SCNC: 139 MMOL/L (ref 136–145)
TOXIC GRANULES BLD QL SMEAR: PRESENT
WBC # BLD AUTO: 2.62 K/UL (ref 3.9–12.7)

## 2024-10-15 PROCEDURE — 94761 N-INVAS EAR/PLS OXIMETRY MLT: CPT

## 2024-10-15 PROCEDURE — 85027 COMPLETE CBC AUTOMATED: CPT | Performed by: NURSE PRACTITIONER

## 2024-10-15 PROCEDURE — 63600175 PHARM REV CODE 636 W HCPCS: Performed by: NURSE PRACTITIONER

## 2024-10-15 PROCEDURE — 25000003 PHARM REV CODE 250: Performed by: INTERNAL MEDICINE

## 2024-10-15 PROCEDURE — 85007 BL SMEAR W/DIFF WBC COUNT: CPT | Performed by: NURSE PRACTITIONER

## 2024-10-15 PROCEDURE — 25000003 PHARM REV CODE 250: Performed by: STUDENT IN AN ORGANIZED HEALTH CARE EDUCATION/TRAINING PROGRAM

## 2024-10-15 PROCEDURE — 84100 ASSAY OF PHOSPHORUS: CPT | Performed by: NURSE PRACTITIONER

## 2024-10-15 PROCEDURE — 25000003 PHARM REV CODE 250: Performed by: NURSE PRACTITIONER

## 2024-10-15 PROCEDURE — 83735 ASSAY OF MAGNESIUM: CPT | Performed by: NURSE PRACTITIONER

## 2024-10-15 PROCEDURE — 80053 COMPREHEN METABOLIC PANEL: CPT | Performed by: NURSE PRACTITIONER

## 2024-10-15 PROCEDURE — 27000221 HC OXYGEN, UP TO 24 HOURS

## 2024-10-15 PROCEDURE — 63600175 PHARM REV CODE 636 W HCPCS: Performed by: STUDENT IN AN ORGANIZED HEALTH CARE EDUCATION/TRAINING PROGRAM

## 2024-10-15 PROCEDURE — 97116 GAIT TRAINING THERAPY: CPT | Mod: CQ

## 2024-10-15 PROCEDURE — 20600001 HC STEP DOWN PRIVATE ROOM

## 2024-10-15 RX ORDER — TRAMADOL HYDROCHLORIDE 50 MG/1
50 TABLET ORAL EVERY 6 HOURS
Status: DISCONTINUED | OUTPATIENT
Start: 2024-10-15 | End: 2024-10-16 | Stop reason: HOSPADM

## 2024-10-15 RX ORDER — LOPERAMIDE HYDROCHLORIDE 2 MG/1
4 CAPSULE ORAL 4 TIMES DAILY PRN
Status: DISCONTINUED | OUTPATIENT
Start: 2024-10-15 | End: 2024-10-15

## 2024-10-15 RX ORDER — MAGNESIUM SULFATE HEPTAHYDRATE 40 MG/ML
4 INJECTION, SOLUTION INTRAVENOUS ONCE
Status: COMPLETED | OUTPATIENT
Start: 2024-10-15 | End: 2024-10-15

## 2024-10-15 RX ORDER — CHOLESTYRAMINE 4 G/9G
2 POWDER, FOR SUSPENSION ORAL 2 TIMES DAILY
Status: DISCONTINUED | OUTPATIENT
Start: 2024-10-15 | End: 2024-10-16 | Stop reason: HOSPADM

## 2024-10-15 RX ORDER — LOPERAMIDE HYDROCHLORIDE 2 MG/1
4 CAPSULE ORAL 4 TIMES DAILY
Status: DISCONTINUED | OUTPATIENT
Start: 2024-10-15 | End: 2024-10-16

## 2024-10-15 RX ADMIN — PROCHLORPERAZINE EDISYLATE 5 MG: 5 INJECTION INTRAMUSCULAR; INTRAVENOUS at 05:10

## 2024-10-15 RX ADMIN — CHOLESTYRAMINE 8 G: 4 POWDER, FOR SUSPENSION ORAL at 08:10

## 2024-10-15 RX ADMIN — PANTOPRAZOLE SODIUM 40 MG: 40 TABLET, DELAYED RELEASE ORAL at 09:10

## 2024-10-15 RX ADMIN — PROCHLORPERAZINE EDISYLATE 5 MG: 5 INJECTION INTRAMUSCULAR; INTRAVENOUS at 12:10

## 2024-10-15 RX ADMIN — DIPHENOXYLATE HYDROCHLORIDE AND ATROPINE SULFATE 1 TABLET: .025; 2.5 TABLET ORAL at 07:10

## 2024-10-15 RX ADMIN — ONDANSETRON 8 MG: 8 TABLET, ORALLY DISINTEGRATING ORAL at 12:10

## 2024-10-15 RX ADMIN — LETERMOVIR 480 MG: 480 TABLET, FILM COATED ORAL at 09:10

## 2024-10-15 RX ADMIN — TRAMADOL HYDROCHLORIDE 50 MG: 50 TABLET, COATED ORAL at 05:10

## 2024-10-15 RX ADMIN — LIDOCAINE 5% 1 PATCH: 700 PATCH TOPICAL at 09:10

## 2024-10-15 RX ADMIN — GABAPENTIN 600 MG: 250 SOLUTION ORAL at 09:10

## 2024-10-15 RX ADMIN — HYDROCORTISONE: 25 CREAM TOPICAL at 09:10

## 2024-10-15 RX ADMIN — MYCOPHENOLATE MOFETIL 1000 MG: 200 POWDER, FOR SUSPENSION ORAL at 09:10

## 2024-10-15 RX ADMIN — Medication 1 DOSE: at 09:10

## 2024-10-15 RX ADMIN — ONDANSETRON 8 MG: 8 TABLET, ORALLY DISINTEGRATING ORAL at 06:10

## 2024-10-15 RX ADMIN — MYCOPHENOLATE MOFETIL 1000 MG: 200 POWDER, FOR SUSPENSION ORAL at 03:10

## 2024-10-15 RX ADMIN — TACROLIMUS 0.5 MG: 0.5 CAPSULE ORAL at 08:10

## 2024-10-15 RX ADMIN — PROCHLORPERAZINE EDISYLATE 5 MG: 5 INJECTION INTRAMUSCULAR; INTRAVENOUS at 06:10

## 2024-10-15 RX ADMIN — LOPERAMIDE HYDROCHLORIDE 4 MG: 2 CAPSULE ORAL at 08:10

## 2024-10-15 RX ADMIN — CHOLESTYRAMINE 8 G: 4 POWDER, FOR SUSPENSION ORAL at 09:10

## 2024-10-15 RX ADMIN — URSODIOL 300 MG: 300 CAPSULE ORAL at 09:10

## 2024-10-15 RX ADMIN — TACROLIMUS 1 MG: 1 CAPSULE ORAL at 05:10

## 2024-10-15 RX ADMIN — URSODIOL 300 MG: 300 CAPSULE ORAL at 08:10

## 2024-10-15 RX ADMIN — Medication 1 DOSE: at 08:10

## 2024-10-15 RX ADMIN — Medication: at 08:10

## 2024-10-15 RX ADMIN — LOPERAMIDE HYDROCHLORIDE 4 MG: 2 CAPSULE ORAL at 05:10

## 2024-10-15 RX ADMIN — POSACONAZOLE 300 MG: 100 TABLET, DELAYED RELEASE ORAL at 09:10

## 2024-10-15 RX ADMIN — POTASSIUM CHLORIDE 20 MEQ: 1500 TABLET, EXTENDED RELEASE ORAL at 09:10

## 2024-10-15 RX ADMIN — LOPERAMIDE HYDROCHLORIDE 4 MG: 2 CAPSULE ORAL at 09:10

## 2024-10-15 RX ADMIN — Medication 1 DOSE: at 05:10

## 2024-10-15 RX ADMIN — GABAPENTIN 600 MG: 250 SOLUTION ORAL at 08:10

## 2024-10-15 RX ADMIN — MYCOPHENOLATE MOFETIL 1000 MG: 200 POWDER, FOR SUSPENSION ORAL at 08:10

## 2024-10-15 RX ADMIN — VANCOMYCIN HYDROCHLORIDE 125 MG: KIT at 08:10

## 2024-10-15 RX ADMIN — ACYCLOVIR 800 MG: 200 SUSPENSION ORAL at 09:10

## 2024-10-15 RX ADMIN — OLANZAPINE 5 MG: 2.5 TABLET, FILM COATED ORAL at 08:10

## 2024-10-15 RX ADMIN — TRAZODONE HYDROCHLORIDE 50 MG: 50 TABLET ORAL at 08:10

## 2024-10-15 RX ADMIN — ACYCLOVIR 800 MG: 200 SUSPENSION ORAL at 08:10

## 2024-10-15 RX ADMIN — VANCOMYCIN HYDROCHLORIDE 125 MG: KIT at 09:10

## 2024-10-15 RX ADMIN — MAGNESIUM SULFATE HEPTAHYDRATE 2 G: 40 INJECTION, SOLUTION INTRAVENOUS at 05:10

## 2024-10-15 RX ADMIN — PROCHLORPERAZINE EDISYLATE 5 MG: 5 INJECTION INTRAMUSCULAR; INTRAVENOUS at 11:10

## 2024-10-15 RX ADMIN — CARVEDILOL 6.25 MG: 6.25 TABLET, FILM COATED ORAL at 09:10

## 2024-10-15 RX ADMIN — LOPERAMIDE HYDROCHLORIDE 4 MG: 2 CAPSULE ORAL at 12:10

## 2024-10-15 RX ADMIN — HYDROCORTISONE: 25 CREAM TOPICAL at 08:10

## 2024-10-15 RX ADMIN — TRAMADOL HYDROCHLORIDE 50 MG: 50 TABLET, COATED ORAL at 12:10

## 2024-10-15 RX ADMIN — Medication 1 DOSE: at 12:10

## 2024-10-15 RX ADMIN — TRAMADOL HYDROCHLORIDE 50 MG: 50 TABLET, COATED ORAL at 11:10

## 2024-10-15 RX ADMIN — POTASSIUM CHLORIDE 20 MEQ: 1500 TABLET, EXTENDED RELEASE ORAL at 06:10

## 2024-10-15 RX ADMIN — Medication: at 09:10

## 2024-10-15 NOTE — PROGRESS NOTES
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 Afebrile. VSS. Engrafted D+20. ANC 1849 today. Continue to have loose watery stool. About 8 episodes yesterday. Loperamide and questran dose increased. GI panel negative. Stool studies pending. Appetite is sub optimal.    Objective:     Vital Signs (Most Recent):  Temp: 97.5 °F (36.4 °C) (10/15/24 0312)  Pulse: 87 (10/15/24 0312)  Resp: 17 (10/14/24 2321)  BP: 118/70 (10/15/24 0312)  SpO2: (!) 93 % (10/15/24 0312) Vital Signs (24h Range):  Temp:  [97.5 °F (36.4 °C)-98.5 °F (36.9 °C)] 97.5 °F (36.4 °C)  Pulse:  [83-91] 87  Resp:  [17-18] 17  SpO2:  [90 %-97 %] 93 %  BP: (105-123)/(56-70) 118/70     Weight: 69 kg (152 lb 1.9 oz)  Body mass index is 22.46 kg/m².  Body surface area is 1.83 meters squared.    ECOG SCORE           [unfilled]    Intake/Output - Last 3 Shifts         10/13 0700  10/14 0659 10/14 0700  10/15 0659 10/15 0700  10/16 0659    P.O. 320 425     I.V. (mL/kg)       Blood 352      Total Intake(mL/kg) 672 (9.7) 425 (6.2)     Urine (mL/kg/hr) 825 (0.5) 800 (0.5)     Stool 0 0     Total Output 825 800     Net -153 -375            Urine Occurrence  1 x     Stool Occurrence 1 x 3 x              Physical Exam  Vitals and nursing note reviewed.   Constitutional:       Appearance: Normal appearance. He is well-developed.      Comments: Frail    HENT:      Head: Normocephalic and atraumatic.      Nose: Nose normal.      Mouth/Throat:      Mouth: Mucous membranes are dry.      Pharynx: No oropharyngeal exudate or posterior oropharyngeal erythema.   Eyes:      General:         Right eye: No discharge.         Left eye: No discharge.      Extraocular Movements: Extraocular movements intact.       Conjunctiva/sclera: Conjunctivae normal.   Cardiovascular:      Rate and Rhythm: Normal rate and regular rhythm.      Heart sounds: Normal heart sounds. No murmur heard.  Pulmonary:      Effort: Pulmonary effort is normal. No respiratory distress.      Breath sounds: Normal breath sounds. No wheezing or rales.   Abdominal:      General: Bowel sounds are normal. There is no distension.      Palpations: Abdomen is soft.      Tenderness: There is no abdominal tenderness.   Musculoskeletal:         General: No deformity. Normal range of motion.      Cervical back: Normal range of motion and neck supple.      Right lower leg: No edema.      Left lower leg: No edema.   Skin:     General: Skin is warm and dry.      Findings: Bruising present. No erythema or rash.      Comments: Right chest wall vas cath. Dressing c/d/i. No sign of infection to site.   Neurological:      General: No focal deficit present.      Mental Status: He is alert and oriented to person, place, and time.   Psychiatric:         Mood and Affect: Mood normal.         Behavior: Behavior normal.         Thought Content: Thought content normal.         Judgment: Judgment normal.            Significant Labs:   All pertinent labs from the last 24 hours have been reviewed.    Diagnostic Results:  I have reviewed and interpreted all pertinent imaging results/findings within the past 24 hours.  Assessment/Plan:     * S/P allogeneic bone marrow transplant  - Patient of Dr. 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 Today is Day +26  Engrafted D+20 (10/9) with ANC of 531  - Tacro level 9.6 on 10/14; continue dose of 0.5mg AM and 1mg PM.   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 None noted thus far.  - Patient is B+. Donor is A+.  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 Patient will start letermovir ppx on 9/24/24.  - Received fresh BMT product on 9/19/24 with a CD34 dose of 3.55 x10^6.  - Of  note, cilostazol may interact with tacro. (Currently on hold for plts < 50K).  - See treatment plan below:    Planned conditioning regimen:  Fludarabine on Day -5, -4, -3, and -2  Melphalan on Day -2  TBI on Day -1     Planned  GVHD Prophylaxis:  Cyclophosphamide (with Mesna) on Days +3 and +4  Mycophenolate starting on Day +5  Tacrolimus starting on Day +5     Antimicrobial Prophylaxis:  Acyclovir starting on Day -5  Levofloxacin starting on Day -1  Micafungin on Day -1 through Day +4  Letermovir starting on Day +5 (if CMV PCR drawn on day 0 results negative)  Posaconazole starting on Day +5  Bactrim starting on Day +30     SOS/VOD Prophylaxis:  Ursodiol on Day -5 through Day +30      Growth Factor Support:  Neupogen starting on Day +5, stopped D+24. Engrafted D+20     Caregiver: wife   Post-transplant discharge plans: home    Hypomagnesemia  - See electrolyte disorder  - replace PRN  - Tacro likely contributing  - will need scheduled magnesium on discharge (currently on 800mg BID)    Electrolyte disorder  - Replete per PRN electrolyte order set  - Daily CMP, mag, and phos while inpatient    Moderate malnutrition  Nutrition consulted. Most recent weight and BMI monitored-     Measurements:  Wt Readings from Last 1 Encounters:   10/08/24 76 kg (167 lb 7 oz)   Body mass index is 24.73 kg/m².    Patient has been screened and assessed by RD.    - Switched boost plus to boost breeze as diary of plus making patient nauseous  - PO intake as tolerated; on scheduled antiemetics for nausea  - stopped IVF with engraftment; encouraged increased PO intake in order to DC    Dry mouth  - continue to moisten lips with chapstick  - encouraged use of oral hygiene rinses to sterilize mouth and stimulate saliva production  - consider speech consult on 10/11 if patient still reporting difficulties swallowing    Urinary frequency  - reports increased nightly frequency and urgency with low output  - no formal BPH diagnosis; started  flomax 9/27  - improved; stopped on 10/10    Chemotherapy-induced nausea  - scheduled zofran IV 8mg q8h on 9/27  - added scheduled compazine 9/30  - added 5mg zyprexa at night 10/8  - has prn ativan  - antiemetics now PO      Hemorrhoids  - d/t chemo induced diarrhea  - has anusol, tucks pads, and LETS gel  - patient denies any bleeding at site  - has PRN morphine for pain control  - hemorrhoid pain improving with diarrhea control    Headache  - C/o headache 9/25  - Daily coffee drinker. Improved after drinking coffee.  - Suspect caffeine withdrawal.  - Can start caffeine tablet if patient is unable to drink coffee due to chemo side effects  - none today    Chemotherapy induced diarrhea  - C-diff neg 9/24  - Of note, was treated empirically for c-diff with oral vanc prior to admission and is receiving oral vanc ppx while admitted.  - Receiving scheduled imodium and PRN lomotil, cholestyramine  -GI panel negative  -Stool studies pending    Neutropenic fever  - First fever with tmax 101.8F on 9/23  - Infection w/u unremarkable thus far  - No fevers since 9/23, Cefepime stopped on 9/25 and back on oral ppx LVQ.  - LVQ stopped 10/10 with ANC >1000  RESOLVED    Insomnia  - states melatonin does not work  - started on scheduled trazodone 10/8    History of Clostridioides difficile infection  - Was treated empirically for c-diff with oral vanc prior to admission.  - Continue oral vanc ppx per transplant protocol      Neuropathy  - Continue home gabapentin    Pancytopenia due to antineoplastic chemotherapy  - Daily CBC while inpatient  - Transfuse for hgb <7 Plt  or <10K  - Continue acyclovir and posaconazole; levaquin stopped 10/10 with ANC >1000  - Holding cilostazol for plts < 50K  ANC 2930 today    Myelodysplastic syndrome  From Dr Hickey's clinic note 8/5:  Stem cell transplant candidate: We discussed the role for allogeneic stem cell transplantation in this disease process as a potentially curative option. We had an  extensive discussion about the rationale, logistics, risks, and benefits. We reviewed the requirement to stay in the [place] area for 100 days with a caregiver at all times. We discussed the risks, including infection, graft failure, organ toxicity, graft versus host disease, relapse of disease, and secondary cancers. We reviewed the need for long-term immunosuppression and need for close monitoring. HCT-CI of 1 (intermediate risk). We had a prolonged discussion today regarding his recent deconditioning, Cdiff, and underlying disease. He is now much improved since last seen, and is improving his strength since starting to work with PT. Diarrhea now controlled. We discussed that our plan to move forward with the transplant process.   Coordinator: Fay Stephens  Regimen:  + 2Gy TBI  Donor: son (haplo)   Graft source: BM  - Admitted 9/13/24 for a  TBI PT Cy haplo (son) allogeneic BMT    Hypertension, essential  - Was holding home Coreg for fluid responsive hypotension. Resumed 9/30.    Coronary artery disease involving native coronary artery of native heart without angina pectoris  - Was holding home Coreg for hypotension. Resumed 9/30.    Physical debility  - PT/OT following while inpatient  - recommending HH with rolling walker  - The mobility limitation cannot be sufficiently resolved by the use of a cane. Patient's functional mobility deficit can be sufficiently resolved with the use of a Rolling Walker. Patient's mobility limitation significantly impairs their ability to participate in one of more activities of daily living.  The use of a Rolling Walker, Walker will significantly improve the patient's ability to participate in MRADLS and the patient will use it on regular basis in the home.        VTE Risk Mitigation (From admission, onward)           Ordered     heparin, porcine (PF) 100 unit/mL injection flush 300 Units  As needed (PRN)         09/13/24 3776                    Disposition:  TBD    Celso Baig MD  Bone Marrow Transplant  [facility] - Oncology ([facility])

## 2024-10-15 NOTE — ASSESSMENT & PLAN NOTE
Patient noted some worry about his health but he stated he is able to focus on actions that may help himself improve physically. Discussed coping skills with patient he can use when he feels anxious. Patient noted family and checo as helpful in his coping. Patient noted as of now he believes he will be discharged from hospital tomorrow and is looking forward to this. Patient denied any other psychological needs at this time and agreed to reach out if his needs change in the future.

## 2024-10-15 NOTE — PLAN OF CARE
Plan of care reviewed with patient and wife.  Fall precautions maintained, side rails up x2,call light in reach , bed in low position and locked, nonskid socks on.  Voiding without difficulty.  Day +26 of haplo stem cell transplant.Wife at the bedside.  Encouraged to eat and drink.  Appetite poor.

## 2024-10-15 NOTE — SUBJECTIVE & OBJECTIVE
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 Afebrile. VSS. Engrafted D+20. ANC 1849 today. Continue to have loose watery stool. About 8 episodes yesterday. Loperamide and questran dose increased. GI panel negative. Stool studies pending. Appetite is sub optimal.    Objective:     Vital Signs (Most Recent):  Temp: 97.5 °F (36.4 °C) (10/15/24 0312)  Pulse: 87 (10/15/24 0312)  Resp: 17 (10/14/24 2321)  BP: 118/70 (10/15/24 0312)  SpO2: (!) 93 % (10/15/24 0312) Vital Signs (24h Range):  Temp:  [97.5 °F (36.4 °C)-98.5 °F (36.9 °C)] 97.5 °F (36.4 °C)  Pulse:  [83-91] 87  Resp:  [17-18] 17  SpO2:  [90 %-97 %] 93 %  BP: (105-123)/(56-70) 118/70     Weight: 69 kg (152 lb 1.9 oz)  Body mass index is 22.46 kg/m².  Body surface area is 1.83 meters squared.    ECOG SCORE           [unfilled]    Intake/Output - Last 3 Shifts         10/13 0700  10/14 0659 10/14 0700  10/15 0659 10/15 0700  10/16 0659    P.O. 320 425     I.V. (mL/kg)       Blood 352      Total Intake(mL/kg) 672 (9.7) 425 (6.2)     Urine (mL/kg/hr) 825 (0.5) 800 (0.5)     Stool 0 0     Total Output 825 800     Net -153 -375            Urine Occurrence  1 x     Stool Occurrence 1 x 3 x              Physical Exam  Vitals and nursing note reviewed.   Constitutional:       Appearance: Normal appearance. He is well-developed.      Comments: Frail    HENT:      Head: Normocephalic and atraumatic.      Nose: Nose normal.      Mouth/Throat:      Mouth: Mucous membranes are dry.      Pharynx: No oropharyngeal exudate or posterior oropharyngeal erythema.   Eyes:      General:         Right eye: No discharge.         Left eye: No discharge.      Extraocular Movements: Extraocular movements intact.      Conjunctiva/sclera: Conjunctivae normal.   Cardiovascular:      Rate and Rhythm: Normal rate and regular rhythm.      Heart sounds: Normal heart sounds. No murmur heard.  Pulmonary:      Effort: Pulmonary effort is normal.  No respiratory distress.      Breath sounds: Normal breath sounds. No wheezing or rales.   Abdominal:      General: Bowel sounds are normal. There is no distension.      Palpations: Abdomen is soft.      Tenderness: There is no abdominal tenderness.   Musculoskeletal:         General: No deformity. Normal range of motion.      Cervical back: Normal range of motion and neck supple.      Right lower leg: No edema.      Left lower leg: No edema.   Skin:     General: Skin is warm and dry.      Findings: Bruising present. No erythema or rash.      Comments: Right chest wall vas cath. Dressing c/d/i. No sign of infection to site.   Neurological:      General: No focal deficit present.      Mental Status: He is alert and oriented to person, place, and time.   Psychiatric:         Mood and Affect: Mood normal.         Behavior: Behavior normal.         Thought Content: Thought content normal.         Judgment: Judgment normal.            Significant Labs:   All pertinent labs from the last 24 hours have been reviewed.    Diagnostic Results:  I have reviewed and interpreted all pertinent imaging results/findings within the past 24 hours.

## 2024-10-15 NOTE — PROGRESS NOTES
Janusz Ma - Oncology ([facility])  Psychology  Progress Note  Individual Psychotherapy (PhD/LCSW)    Patient Name: Guillaume Salinas  MRN: [id number]    Patient Class: IP- Inpatient  Admission Date: 9/13/2024  Hospital Length of Stay: 32 days  Attending Physician: Mohit Hickey MD  Primary Care Provider: Kal Hargrove MD    Therapeutic Intervention: Met with patient.  Inpatient/Partial Hospital - Behavioral Health Assessment  - CPT code 25640    Chief Complaint/Reason for Encounter: coping with hospital stay and medical health     Interval History and Content of Current Session: Patient confirmed his identity via two identifiers and agreed to consent to meet with me today. Upper sorbian  Lina Peabody was also present for interpretive services with patient's consent. Patient was lying in hospital bed. He noted he has had some worried thoughts about his health since his discharged has been pushed back a couple times, but noted he focuses more on thoughts of what he needs to do to improve himself physically. He also reported that he has not been eating as much as he feels full. Patient stated plan is currently for him to be discharged tomorrow and he noted he is looking forward to being with his family. He acknowledged his life will be different upon discharge and he accepts his new normal. Patient denied any recent crying spells. He stated he feels fine and a little tired today as he stated he went walking in hallway with his wife today. Talked coping skills with patient for when he is discharged. Patient noted no other psychological needs at this time. He did request for my clinic contact in case he needs to reach out in the future and I provided this to patient.    Risk Parameters:  Patient reports no suicidal ideation  Patient reports no homicidal ideation  Patient reports no self-injurious behavior  Patient reports no violent behavior    Verbal Deficits: None    Patient's response to  intervention:  The patient's response to intervention is accepting, motivated.    Progress toward goals and other mental status changes:  The patient's progress toward goals is good.  Diagnostic Impression - Plan:       ICD-10-CM ICD-9-CM   1. Myelodysplastic syndrome  D46.9 238.75   2. MDS (myelodysplastic syndrome)  D46.9 238.75   3. Chest pain  R07.9 786.50   4. Stem cell transplant candidate  Z76.82 V49.83   5. S/P allogeneic bone marrow transplant  Z94.81 V42.81        Oncology  Myelodysplastic syndrome  Patient noted some worry about his health but he stated he is able to focus on actions that may help himself improve physically. Discussed coping skills with patient he can use when he feels anxious. Patient noted family and checo as helpful in his coping. Patient noted as of now he believes he will be discharged from hospital tomorrow and is looking forward to this. Patient denied any other psychological needs at this time and agreed to reach out if his needs change in the future.        Treatment Plan:  Target symptoms: assessment for coping  Why chosen therapy is appropriate versus another modality: relevant to diagnosis, patient responds to this modality, evidence based practice  Outcome monitoring methods: self-report, observation  Therapeutic intervention type:  behavioral health assessment    Plan:  Patient will [facility] and will reach out if psychological needs present in the future.    Return to Clinic: as needed    Length of Service (minutes):  23    Giles Magana PsyD  Psychology  [facility] - Oncology ([facility])

## 2024-10-15 NOTE — ASSESSMENT & PLAN NOTE
- C-diff neg 9/24  - Of note, was treated empirically for c-diff with oral vanc prior to admission and is receiving oral vanc ppx while admitted.  - Receiving scheduled imodium and PRN lomotil, cholestyramine  -GI panel negative  -Stool studies pending

## 2024-10-15 NOTE — SUBJECTIVE & OBJECTIVE
Therapeutic Intervention: Met with patient.  Inpatient/Partial Hospital - Behavioral Health Assessment  - CPT code 62909    Chief Complaint/Reason for Encounter: coping with hospital stay and medical health     Interval History and Content of Current Session: Patient confirmed his identity via two identifiers and agreed to consent to meet with me today. Haitian  Lina Peabody was also present for interpretive services with patient's consent. Patient was lying in hospital bed. He noted he has had some worried thoughts about his health since his discharged has been pushed back a couple times, but noted he focuses more on thoughts of what he needs to do to improve himself physically. He also reported that he has not been eating as much as he feels full. Patient stated plan is currently for him to be discharged tomorrow and he noted he is looking forward to being with his family. He acknowledged his life will be different upon discharge and he accepts his new normal. Patient denied any recent crying spells. He stated he feels fine and a little tired today as he stated he went walking in hallway with his wife today. Talked coping skills with patient for when he is discharged. Patient noted no other psychological needs at this time. He did request for my clinic contact in case he needs to reach out in the future and I provided this to patient.    Risk Parameters:  Patient reports no suicidal ideation  Patient reports no homicidal ideation  Patient reports no self-injurious behavior  Patient reports no violent behavior    Verbal Deficits: None    Patient's response to intervention:  The patient's response to intervention is accepting, motivated.    Progress toward goals and other mental status changes:  The patient's progress toward goals is good.

## 2024-10-15 NOTE — PT/OT/SLP PROGRESS
Physical Therapy Treatment    Patient Name:  Guillaume Salinas   MRN:  [id number]    Recommendations:     Discharge Recommendations: Low Intensity Therapy  Discharge Equipment Recommendations: walker, rolling  Barriers to discharge: None    Assessment:     Guillaume Salinas is a 69 y.o. male admitted with a medical diagnosis of S/P allogeneic bone marrow transplant.  He presents with the following impairments/functional limitations: weakness, impaired functional mobility, gait instability, impaired balance requiring SB assistance and verbal cues for sit < > stand transitions and amb to prevent falls due to weakness and fatigue.    Pt remains motivated to participate in PT session and will cont to benefit from skilled PT intervention..    Rehab Prognosis: Good; patient would benefit from acute skilled PT services to address these deficits and reach maximum level of function.    Recent Surgery: * No surgery found *      Plan:     During this hospitalization, patient to be seen 2 x/week to address the identified rehab impairments via gait training, therapeutic activities, therapeutic exercises, neuromuscular re-education and progress toward the following goals:    Plan of Care Expires:  10/15/24    Subjective     Chief Complaint: weakness, fatigue  Pain/Comfort:  Pain Rating 1: 0/10  Pain Rating Post-Intervention 1: 0/10      Objective:     Communicated with nurse (Arlette) prior to session.  Patient found  L side lying with HOB elevated  with central line (wife present) upon PT entry to room.     General Precautions: Standard, fall  Orthopedic Precautions: N/A  Braces: N/A  Respiratory Status: Room air     Functional Mobility:  Bed Mobility:     Supine to Sit: modified independence  Transfers:     Sit to Stand:  from EOB with SBA with rolling walker  Gait: RW SBA 400ft in hallway with mild instability, but no LOB       Pt tolerates static standing with RW requiring SBA for safety x2 min while being  assisted with hygiene.         AM-PAC 6 CLICK MOBILITY  Turning over in bed (including adjusting bedclothes, sheets and blankets)?: 4  Sitting down on and standing up from a chair with arms (e.g., wheelchair, bedside commode, etc.): 3  Moving from lying on back to sitting on the side of the bed?: 4  Moving to and from a bed to a chair (including a wheelchair)?: 3  Need to walk in hospital room?: 3  Climbing 3-5 steps with a railing?: 3  Basic Mobility Total Score: 20       Treatment & Education:  Patient provided with daily orientation and goals of this PT session. They were educated to call for assistance and to transfer with hospital staff only.  Also, pt was educated on the effects of prolonged immobility and the importance of performing OOB activity and exercises to promote healing and reduce recovery time    Patient left up in chair with call button in reach and PCT (Shweta) present..    GOALS:   Multidisciplinary Problems       Physical Therapy Goals          Problem: Physical Therapy    Goal Priority Disciplines Outcome Interventions   Physical Therapy Goal     PT, PT/OT Progressing    Description: Goals to be met by: 10/15/24  Extended goals to be met by 10/30/24     Patient will increase functional independence with mobility by performin. Supine to sit with [place]  2. Sit to supine with [place]  3. Sit to stand transfer with [place]  4. Bed to chair transfer with [place] using No Assistive Device  5. Gait  x 500 feet with [place] using No Assistive Device.   6. Lower extremity exercise program x15 reps per handout, with assistance as needed                         Time Tracking:     PT Received On: 10/15/24  PT Start Time: 1436     PT Stop Time: 1458  PT Total Time (min): 22 min     Billable Minutes: Gait Training 22    Treatment Type: Treatment  PT/PTA: PTA     Number of PTA visits since last PT visit: 3     10/15/2024

## 2024-10-15 NOTE — PLAN OF CARE
Problem: Adult Inpatient Plan of Care  Goal: Plan of Care Review  Outcome: Progressing     Problem: Adult Inpatient Plan of Care  Goal: Optimal Comfort and Wellbeing  Outcome: Progressing   VSS. Denies any pain. Afebrile. Complained of diarrhea, PRN imodium given. Patient reports 3 BMS. Ambulated with assitance. No acute changes overnight. Bed alarm set and call light within reach.

## 2024-10-15 NOTE — PLAN OF CARE
Problem: Physical Therapy  Goal: Physical Therapy Goal  Description: Goals to be met by: 10/15/24  Extended goals to be met by 10/30/24     Patient will increase functional independence with mobility by performin. Supine to sit with [place]  2. Sit to supine with [place]  3. Sit to stand transfer with [place]  4. Bed to chair transfer with [place] using No Assistive Device  5. Gait  x 500 feet with [place] using No Assistive Device.   6. Lower extremity exercise program x15 reps per handout, with assistance as needed    Goals remain appropriate  Outcome: Progressing

## 2024-10-15 NOTE — ASSESSMENT & PLAN NOTE
- Patient of Dr. 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 Today is Day +26  Engrafted D+20 (10/9) with ANC of 531  - Tacro level 9.6 on 10/14; continue dose of 0.5mg AM and 1mg PM.   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 None noted thus far.  - Patient is B+. Donor is A+.  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 Patient will start letermovir ppx on 9/24/24.  - Received fresh BMT product on 9/19/24 with a CD34 dose of 3.55 x10^6.  - Of note, cilostazol may interact with tacro. (Currently on hold for plts < 50K).  - See treatment plan below:    Planned conditioning regimen:  Fludarabine on Day -5, -4, -3, and -2  Melphalan on Day -2  TBI on Day -1     Planned  GVHD Prophylaxis:  Cyclophosphamide (with Mesna) on Days +3 and +4  Mycophenolate starting on Day +5  Tacrolimus starting on Day +5     Antimicrobial Prophylaxis:  Acyclovir starting on Day -5  Levofloxacin starting on Day -1  Micafungin on Day -1 through Day +4  Letermovir starting on Day +5 (if CMV PCR drawn on day 0 results negative)  Posaconazole starting on Day +5  Bactrim starting on Day +30     SOS/VOD Prophylaxis:  Ursodiol on Day -5 through Day +30      Growth Factor Support:  Neupogen starting on Day +5, stopped D+24. Engrafted D+20     Caregiver: wife   Post-transplant discharge plans: home

## 2024-10-16 ENCOUNTER — PATIENT MESSAGE (OUTPATIENT)
Dept: HEMATOLOGY/ONCOLOGY | Facility: CLINIC | Age: 69
End: 2024-10-16
Payer: MEDICARE

## 2024-10-16 VITALS
RESPIRATION RATE: 16 BRPM | HEIGHT: 69 IN | HEART RATE: 88 BPM | TEMPERATURE: 98 F | BODY MASS INDEX: 22.53 KG/M2 | OXYGEN SATURATION: 92 % | SYSTOLIC BLOOD PRESSURE: 112 MMHG | WEIGHT: 152.13 LBS | DIASTOLIC BLOOD PRESSURE: 68 MMHG

## 2024-10-16 LAB
ALBUMIN SERPL BCP-MCNC: 2.8 G/DL (ref 3.5–5.2)
ALP SERPL-CCNC: 58 U/L (ref 55–135)
ALT SERPL W/O P-5'-P-CCNC: 21 U/L (ref 10–44)
ANION GAP SERPL CALC-SCNC: 6 MMOL/L (ref 8–16)
ANISOCYTOSIS BLD QL SMEAR: SLIGHT
AST SERPL-CCNC: 24 U/L (ref 10–40)
BASOPHILS # BLD AUTO: 0.01 K/UL (ref 0–0.2)
BASOPHILS NFR BLD: 0.6 % (ref 0–1.9)
BILIRUB SERPL-MCNC: 0.4 MG/DL (ref 0.1–1)
BLD PROD TYP BPU: NORMAL
BLOOD UNIT EXPIRATION DATE: NORMAL
BLOOD UNIT TYPE CODE: 7300
BLOOD UNIT TYPE: NORMAL
BUN SERPL-MCNC: 10 MG/DL (ref 8–23)
CALCIUM SERPL-MCNC: 8.4 MG/DL (ref 8.7–10.5)
CHLORIDE SERPL-SCNC: 106 MMOL/L (ref 95–110)
CO2 SERPL-SCNC: 26 MMOL/L (ref 23–29)
CODING SYSTEM: NORMAL
CREAT SERPL-MCNC: 0.7 MG/DL (ref 0.5–1.4)
CROSSMATCH INTERPRETATION: NORMAL
DIFFERENTIAL METHOD BLD: ABNORMAL
DISPENSE STATUS: NORMAL
EOSINOPHIL # BLD AUTO: 0 K/UL (ref 0–0.5)
EOSINOPHIL NFR BLD: 0 % (ref 0–8)
ERYTHROCYTE [DISTWIDTH] IN BLOOD BY AUTOMATED COUNT: 12.9 % (ref 11.5–14.5)
EST. GFR  (NO RACE VARIABLE): >60 ML/MIN/1.73 M^2
GLUCOSE SERPL-MCNC: 109 MG/DL (ref 70–110)
HCT VFR BLD AUTO: 19.8 % (ref 40–54)
HGB BLD-MCNC: 6.6 G/DL (ref 14–18)
HYPOCHROMIA BLD QL SMEAR: ABNORMAL
IMM GRANULOCYTES # BLD AUTO: 0.05 K/UL (ref 0–0.04)
IMM GRANULOCYTES NFR BLD AUTO: 2.9 % (ref 0–0.5)
LYMPHOCYTES # BLD AUTO: 0 K/UL (ref 1–4.8)
LYMPHOCYTES NFR BLD: 1.7 % (ref 18–48)
MAGNESIUM SERPL-MCNC: 2 MG/DL (ref 1.6–2.6)
MCH RBC QN AUTO: 28.8 PG (ref 27–31)
MCHC RBC AUTO-ENTMCNC: 33.3 G/DL (ref 32–36)
MCV RBC AUTO: 87 FL (ref 82–98)
MONOCYTES # BLD AUTO: 0.9 K/UL (ref 0.3–1)
MONOCYTES NFR BLD: 51.4 % (ref 4–15)
NEUTROPHILS # BLD AUTO: 0.8 K/UL (ref 1.8–7.7)
NEUTROPHILS NFR BLD: 43.4 % (ref 38–73)
NRBC BLD-RTO: 0 /100 WBC
NUM UNITS TRANS PACKED RBC: NORMAL
OVALOCYTES BLD QL SMEAR: ABNORMAL
PHOSPHATE SERPL-MCNC: 3.5 MG/DL (ref 2.7–4.5)
PLATELET # BLD AUTO: 14 K/UL (ref 150–450)
PLATELET BLD QL SMEAR: ABNORMAL
PMV BLD AUTO: 11 FL (ref 9.2–12.9)
POIKILOCYTOSIS BLD QL SMEAR: SLIGHT
POLYCHROMASIA BLD QL SMEAR: ABNORMAL
POTASSIUM SERPL-SCNC: 3.7 MMOL/L (ref 3.5–5.1)
PROT SERPL-MCNC: 5.2 G/DL (ref 6–8.4)
RBC # BLD AUTO: 2.29 M/UL (ref 4.6–6.2)
SODIUM SERPL-SCNC: 138 MMOL/L (ref 136–145)
TACROLIMUS BLD-MCNC: 7.8 NG/ML (ref 5–15)
WBC # BLD AUTO: 1.73 K/UL (ref 3.9–12.7)

## 2024-10-16 PROCEDURE — 80197 ASSAY OF TACROLIMUS: CPT | Performed by: INTERNAL MEDICINE

## 2024-10-16 PROCEDURE — 25000003 PHARM REV CODE 250: Performed by: NURSE PRACTITIONER

## 2024-10-16 PROCEDURE — 36430 TRANSFUSION BLD/BLD COMPNT: CPT

## 2024-10-16 PROCEDURE — 63600175 PHARM REV CODE 636 W HCPCS: Performed by: NURSE PRACTITIONER

## 2024-10-16 PROCEDURE — 25000003 PHARM REV CODE 250: Performed by: STUDENT IN AN ORGANIZED HEALTH CARE EDUCATION/TRAINING PROGRAM

## 2024-10-16 PROCEDURE — 86920 COMPATIBILITY TEST SPIN: CPT | Performed by: STUDENT IN AN ORGANIZED HEALTH CARE EDUCATION/TRAINING PROGRAM

## 2024-10-16 PROCEDURE — 84100 ASSAY OF PHOSPHORUS: CPT | Performed by: NURSE PRACTITIONER

## 2024-10-16 PROCEDURE — 25000003 PHARM REV CODE 250: Performed by: INTERNAL MEDICINE

## 2024-10-16 PROCEDURE — 83735 ASSAY OF MAGNESIUM: CPT | Performed by: NURSE PRACTITIONER

## 2024-10-16 PROCEDURE — 99239 HOSP IP/OBS DSCHRG MGMT >30: CPT | Mod: GC,,, | Performed by: INTERNAL MEDICINE

## 2024-10-16 PROCEDURE — 85025 COMPLETE CBC W/AUTO DIFF WBC: CPT | Performed by: NURSE PRACTITIONER

## 2024-10-16 PROCEDURE — P9040 RBC LEUKOREDUCED IRRADIATED: HCPCS | Performed by: STUDENT IN AN ORGANIZED HEALTH CARE EDUCATION/TRAINING PROGRAM

## 2024-10-16 PROCEDURE — 80053 COMPREHEN METABOLIC PANEL: CPT | Performed by: NURSE PRACTITIONER

## 2024-10-16 RX ORDER — OLANZAPINE 5 MG/1
5 TABLET ORAL NIGHTLY
Qty: 30 TABLET | Refills: 11 | Status: SHIPPED | OUTPATIENT
Start: 2024-10-16 | End: 2025-10-16

## 2024-10-16 RX ORDER — LIDOCAINE 50 MG/G
1 PATCH TOPICAL DAILY
Qty: 30 PATCH | Refills: 0 | Status: SHIPPED | OUTPATIENT
Start: 2024-10-16

## 2024-10-16 RX ORDER — TRAMADOL HYDROCHLORIDE 50 MG/1
50 TABLET ORAL EVERY 6 HOURS PRN
Qty: 20 TABLET | Refills: 0 | Status: SHIPPED | OUTPATIENT
Start: 2024-10-16

## 2024-10-16 RX ORDER — ONDANSETRON 8 MG/1
8 TABLET, ORALLY DISINTEGRATING ORAL EVERY 8 HOURS
Qty: 90 TABLET | Refills: 11 | Status: SHIPPED | OUTPATIENT
Start: 2024-10-16

## 2024-10-16 RX ORDER — PROCHLORPERAZINE MALEATE 5 MG
5 TABLET ORAL EVERY 6 HOURS
Qty: 120 TABLET | Refills: 11 | Status: SHIPPED | OUTPATIENT
Start: 2024-10-16

## 2024-10-16 RX ORDER — TRAZODONE HYDROCHLORIDE 50 MG/1
50 TABLET ORAL NIGHTLY PRN
Qty: 30 TABLET | Refills: 11 | Status: SHIPPED | OUTPATIENT
Start: 2024-10-16 | End: 2025-10-16

## 2024-10-16 RX ORDER — HYDROCODONE BITARTRATE AND ACETAMINOPHEN 500; 5 MG/1; MG/1
TABLET ORAL
Status: DISCONTINUED | OUTPATIENT
Start: 2024-10-16 | End: 2024-10-16 | Stop reason: HOSPADM

## 2024-10-16 RX ORDER — TACROLIMUS 0.5 MG/1
CAPSULE ORAL
Qty: 90 CAPSULE | Refills: 11 | Status: CANCELLED | OUTPATIENT
Start: 2024-10-16 | End: 2025-10-16

## 2024-10-16 RX ORDER — TACROLIMUS 0.5 MG/1
CAPSULE ORAL
Qty: 90 CAPSULE | Refills: 11 | Status: SHIPPED | OUTPATIENT
Start: 2024-10-16 | End: 2025-10-16

## 2024-10-16 RX ORDER — GABAPENTIN 300 MG/1
600 CAPSULE ORAL 2 TIMES DAILY
Qty: 120 CAPSULE | Refills: 5 | Status: SHIPPED | OUTPATIENT
Start: 2024-10-16

## 2024-10-16 RX ORDER — FILGRASTIM-SNDZ 300 UG/.5ML
300 INJECTION, SOLUTION INTRAVENOUS; SUBCUTANEOUS DAILY
Qty: 2.5 ML | Refills: 0 | Status: ACTIVE | OUTPATIENT
Start: 2024-10-16

## 2024-10-16 RX ORDER — LOPERAMIDE HYDROCHLORIDE 2 MG/1
2 CAPSULE ORAL 2 TIMES DAILY
Qty: 60 CAPSULE | Refills: 0 | Status: SHIPPED | OUTPATIENT
Start: 2024-10-16

## 2024-10-16 RX ORDER — LANOLIN ALCOHOL/MO/W.PET/CERES
800 CREAM (GRAM) TOPICAL DAILY
Qty: 60 TABLET | Refills: 1 | Status: SHIPPED | OUTPATIENT
Start: 2024-10-16

## 2024-10-16 RX ORDER — LOPERAMIDE HYDROCHLORIDE 2 MG/1
2 CAPSULE ORAL 3 TIMES DAILY PRN
Qty: 30 CAPSULE | Refills: 0 | Status: SHIPPED | OUTPATIENT
Start: 2024-10-16 | End: 2024-10-16 | Stop reason: DRUGHIGH

## 2024-10-16 RX ORDER — LOPERAMIDE HYDROCHLORIDE 2 MG/1
2 CAPSULE ORAL 2 TIMES DAILY
Status: DISCONTINUED | OUTPATIENT
Start: 2024-10-16 | End: 2024-10-16 | Stop reason: HOSPADM

## 2024-10-16 RX ORDER — LIDOCAINE 50 MG/G
1 PATCH TOPICAL DAILY
Qty: 30 PATCH | Refills: 0 | Status: SHIPPED | OUTPATIENT
Start: 2024-10-16 | End: 2024-10-16 | Stop reason: SDUPTHER

## 2024-10-16 RX ADMIN — POTASSIUM CHLORIDE 20 MEQ: 1500 TABLET, EXTENDED RELEASE ORAL at 06:10

## 2024-10-16 RX ADMIN — POSACONAZOLE 300 MG: 100 TABLET, DELAYED RELEASE ORAL at 09:10

## 2024-10-16 RX ADMIN — ACYCLOVIR 800 MG: 200 SUSPENSION ORAL at 09:10

## 2024-10-16 RX ADMIN — LETERMOVIR 480 MG: 480 TABLET, FILM COATED ORAL at 09:10

## 2024-10-16 RX ADMIN — HYDROCORTISONE: 25 CREAM TOPICAL at 09:10

## 2024-10-16 RX ADMIN — Medication 1 DOSE: at 12:10

## 2024-10-16 RX ADMIN — ONDANSETRON 8 MG: 8 TABLET, ORALLY DISINTEGRATING ORAL at 06:10

## 2024-10-16 RX ADMIN — URSODIOL 300 MG: 300 CAPSULE ORAL at 09:10

## 2024-10-16 RX ADMIN — CARVEDILOL 6.25 MG: 6.25 TABLET, FILM COATED ORAL at 09:10

## 2024-10-16 RX ADMIN — DIPHENHYDRAMINE HYDROCHLORIDE 50 MG: 25 CAPSULE ORAL at 09:10

## 2024-10-16 RX ADMIN — PROCHLORPERAZINE EDISYLATE 5 MG: 5 INJECTION INTRAMUSCULAR; INTRAVENOUS at 06:10

## 2024-10-16 RX ADMIN — ONDANSETRON 8 MG: 8 TABLET, ORALLY DISINTEGRATING ORAL at 02:10

## 2024-10-16 RX ADMIN — LIDOCAINE 5% 1 PATCH: 700 PATCH TOPICAL at 09:10

## 2024-10-16 RX ADMIN — VANCOMYCIN HYDROCHLORIDE 125 MG: KIT at 09:10

## 2024-10-16 RX ADMIN — CHOLESTYRAMINE 8 G: 4 POWDER, FOR SUSPENSION ORAL at 09:10

## 2024-10-16 RX ADMIN — Medication 1 DOSE: at 09:10

## 2024-10-16 RX ADMIN — MYCOPHENOLATE MOFETIL 1000 MG: 200 POWDER, FOR SUSPENSION ORAL at 09:10

## 2024-10-16 RX ADMIN — PANTOPRAZOLE SODIUM 40 MG: 40 TABLET, DELAYED RELEASE ORAL at 09:10

## 2024-10-16 RX ADMIN — Medication: at 09:10

## 2024-10-16 RX ADMIN — LOPERAMIDE HYDROCHLORIDE 2 MG: 2 CAPSULE ORAL at 09:10

## 2024-10-16 RX ADMIN — GABAPENTIN 600 MG: 250 SOLUTION ORAL at 09:10

## 2024-10-16 RX ADMIN — PROCHLORPERAZINE EDISYLATE 5 MG: 5 INJECTION INTRAMUSCULAR; INTRAVENOUS at 12:10

## 2024-10-16 RX ADMIN — TRAMADOL HYDROCHLORIDE 50 MG: 50 TABLET, COATED ORAL at 06:10

## 2024-10-16 RX ADMIN — TACROLIMUS 0.5 MG: 0.5 CAPSULE ORAL at 08:10

## 2024-10-16 NOTE — PLAN OF CARE
Problem: Adult Inpatient Plan of Care  Goal: Plan of Care Review  Outcome: Progressing     Problem: Adult Inpatient Plan of Care  Goal: Optimal Comfort and Wellbeing  Outcome: Progressing   VSS. Denies any pain. Afebrile. No bowel movements on shift. No acute changes overnight.

## 2024-10-16 NOTE — PLAN OF CARE
CHW met with patient/family at bedside. Patient experience rounding completed and reviewed the following.     Do you know your discharge plan? Yes Home Health      If yes, what is the plan? (Home, Home Health, Rehab, SNF, LTAC, or Other)     Have you discussed your needs and preferences with your SW/CM? Yes     If you are discharging home, do you have help at home? Yes   Patient is on home health.    Do you think you will need help additional at home at discharge?  No   Patient has no need for additional help.    Do you currently have difficulty keeping up with bills, affording medicine or buying food? No  Patient has help with bills, food, and medicine.    Assigned SW/CM notified of any patient/family needs or concerns. Appropriate resources provided to address patient's needs.              Ancelmo ZUNIGA, RSW  Case Management  [phone]

## 2024-10-16 NOTE — PROGRESS NOTES
Janusz Ma - Oncology ([facility])  Hematology  Bone Marrow Transplant  Progress Note    Patient Name: Guillaume Salinas  Admission Date: 9/13/2024  Hospital Length of Stay: 33 days  Code Status: Full Code    Subjective:     Interval History:  D+27 s/p  + TBI + PT Cy Haploidentical (son) BMT for MDS. Afebrile. VSS. Engrafted D+20. Diarrhea resolved. His bilateral shoulder pain is better. No new complaints. Hgb 6.6, transfusing one unit of prbc. His ANC is trending down after discontinuing GCSF. It has dropped over 50% and will need GCSF at outpatient. He will be discharge with home health PT.    Objective:     Vital Signs (Most Recent):  Temp: 98.2 °F (36.8 °C) (10/16/24 0445)  Pulse: 85 (10/16/24 0445)  Resp: 16 (10/16/24 0639)  BP: (!) 110/53 (10/16/24 0445)  SpO2: (!) 92 % (10/16/24 0445) Vital Signs (24h Range):  Temp:  [97.5 °F (36.4 °C)-98.4 °F (36.9 °C)] 98.2 °F (36.8 °C)  Pulse:  [82-87] 85  Resp:  [16-18] 16  SpO2:  [90 %-94 %] 92 %  BP: (103-136)/(51-74) 110/53     Weight: 69 kg (152 lb 1.9 oz)  Body mass index is 22.46 kg/m².  Body surface area is 1.83 meters squared.    ECOG SCORE           [unfilled]    Intake/Output - Last 3 Shifts         10/14 0700  10/15 0659 10/15 0700  10/16 0659 10/16 0700  10/17 0659    P.O. 425 550     Blood       Total Intake(mL/kg) 425 (6.2) 550 (8)     Urine (mL/kg/hr) 800 (0.5) 550 (0.3)     Stool 0 0     Total Output 800 550     Net -375 0            Urine Occurrence 1 x 3 x     Stool Occurrence 3 x 1 x              Physical Exam  Vitals and nursing note reviewed.   Constitutional:       Appearance: Normal appearance. He is well-developed.      Comments: Frail    HENT:      Head: Normocephalic and atraumatic.      Nose: Nose normal.      Mouth/Throat:      Mouth: Mucous membranes are dry.      Pharynx: No oropharyngeal exudate or posterior oropharyngeal erythema.   Eyes:      General:         Right eye: No discharge.         Left eye: No discharge.      Extraocular  Movements: Extraocular movements intact.      Conjunctiva/sclera: Conjunctivae normal.   Cardiovascular:      Rate and Rhythm: Normal rate and regular rhythm.      Heart sounds: Normal heart sounds. No murmur heard.  Pulmonary:      Effort: Pulmonary effort is normal. No respiratory distress.      Breath sounds: Normal breath sounds. No wheezing or rales.   Abdominal:      General: Bowel sounds are normal. There is no distension.      Palpations: Abdomen is soft.      Tenderness: There is no abdominal tenderness.   Musculoskeletal:         General: No deformity. Normal range of motion.      Cervical back: Normal range of motion and neck supple.      Right lower leg: No edema.      Left lower leg: No edema.   Skin:     General: Skin is warm and dry.      Findings: Bruising present. No erythema or rash.      Comments: Right chest wall vas cath. Dressing c/d/i. No sign of infection to site.   Neurological:      General: No focal deficit present.      Mental Status: He is alert and oriented to person, place, and time.   Psychiatric:         Mood and Affect: Mood normal.         Behavior: Behavior normal.         Thought Content: Thought content normal.         Judgment: Judgment normal.            Significant Labs:   All pertinent labs from the last 24 hours have been reviewed.    Diagnostic Results:  I have reviewed and interpreted all pertinent imaging results/findings within the past 24 hours.  Assessment/Plan:     * S/P allogeneic bone marrow transplant  - Patient of Dr. 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 Today is Day +27  Engrafted D+20 (10/9) with ANC of 531  - Tacro level 7.8 on 10/16; continue dose of 0.5mg AM and 1mg PM.   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 None noted thus far.  - Patient is B+. Donor is A+.  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 Patient will start letermovir ppx on 9/24/24.  - Received fresh BMT product on  9/19/24 with a CD34 dose of 3.55 x10^6.  - Of note, cilostazol may interact with tacro. (Currently on hold for plts < 50K).  - See treatment plan below:    Planned conditioning regimen:  Fludarabine on Day -5, -4, -3, and -2  Melphalan on Day -2  TBI on Day -1     Planned  GVHD Prophylaxis:  Cyclophosphamide (with [place]na) on Days +3 and +4  Mycophenolate starting on Day +5  Tacrolimus starting on Day +5     Antimicrobial Prophylaxis:  Acyclovir starting on Day -5  Levofloxacin starting on Day -1  Micafungin on Day -1 through Day +4  Letermovir starting on Day +5 (if CMV PCR drawn on day 0 results negative)  Posaconazole starting on Day +5  Bactrim starting on Day +30     SOS/VOD Prophylaxis:  Ursodiol on Day -5 through Day +30      Growth Factor Support:  Neupogen starting on Day +5, stopped D+24. Engrafted D+20     Caregiver: wife   Post-transplant discharge plans: home    Hypomagnesemia  - See electrolyte disorder  - replace PRN  - Tacro likely contributing  - will need scheduled magnesium on discharge (currently on 800mg BID)    Electrolyte disorder  - Replete per PRN electrolyte order set  - Daily CMP, mag, and phos while inpatient    Moderate malnutrition  Nutrition consulted. Most recent weight and BMI monitored-     Measurements:  Wt Readings from Last 1 Encounters:   10/08/24 76 kg (167 lb 7 oz)   Body mass index is 24.73 kg/m².    Patient has been screened and assessed by RD.    - Switched boost plus to boost breeze as diary of plus making patient nauseous  - PO intake as tolerated; on scheduled antiemetics for nausea  - stopped IVF with engraftment; encouraged increased PO intake in order to DC    Dry mouth  - continue to moisten lips with chapstick  - encouraged use of oral hygiene rinses to sterilize mouth and stimulate saliva production  - consider speech consult on 10/11 if patient still reporting difficulties swallowing    Urinary frequency  - reports increased nightly frequency and urgency with low  output  - no formal BPH diagnosis; started flomax 9/27  - improved; stopped on 10/10    Chemotherapy-induced nausea  - scheduled zofran IV 8mg q8h on 9/27  - added scheduled compazine 9/30  - added 5mg zyprexa at night 10/8  - has prn ativan  - antiemetics now PO      Hemorrhoids  - d/t chemo induced diarrhea  - has anusol, tucks pads, and LETS gel  - patient denies any bleeding at site  - has PRN morphine for pain control  - hemorrhoid pain improving with diarrhea control    Headache  - C/o headache 9/25  - Daily coffee drinker. Improved after drinking coffee.  - Suspect caffeine withdrawal.  - Can start caffeine tablet if patient is unable to drink coffee due to chemo side effects  - none today    Chemotherapy induced diarrhea  - C-diff neg 9/24  - Of note, was treated empirically for c-diff with oral vanc prior to admission and is receiving oral vanc ppx while admitted.  - Receiving scheduled imodium and PRN lomotil, cholestyramine  -GI panel negative  -Stool studies pending    Neutropenic fever  - First fever with tmax 101.8F on 9/23  - Infection w/u unremarkable thus far  - No fevers since 9/23, Cefepime stopped on 9/25 and back on oral ppx LVQ.  - LVQ stopped 10/10 with ANC >1000  RESOLVED    Insomnia  - states melatonin does not work  - started on scheduled trazodone 10/8    History of Clostridioides difficile infection  - Was treated empirically for c-diff with oral vanc prior to admission.  - Continue oral vanc ppx per transplant protocol      Neuropathy  - Continue home gabapentin    Pancytopenia due to antineoplastic chemotherapy  - Daily CBC while inpatient  - Transfuse for hgb <7 Plt  or <10K  - Continue acyclovir and posaconazole; levaquin stopped 10/10 with ANC >1000  - Holding cilostazol for plts < 50K  ANC 2930 today    Myelodysplastic syndrome  From Dr Hickey's clinic note 8/5:  Stem cell transplant candidate: We discussed the role for allogeneic stem cell transplantation in this disease process  as a potentially curative option. We had an extensive discussion about the rationale, logistics, risks, and benefits. We reviewed the requirement to stay in the [place] area for 100 days with a caregiver at all times. We discussed the risks, including infection, graft failure, organ toxicity, graft versus host disease, relapse of disease, and secondary cancers. We reviewed the need for long-term immunosuppression and need for close monitoring. HCT-CI of 1 (intermediate risk). We had a prolonged discussion today regarding his recent deconditioning, Cdiff, and underlying disease. He is now much improved since last seen, and is improving his strength since starting to work with PT. Diarrhea now controlled. We discussed that our plan to move forward with the transplant process.   Coordinator: Fay Stephens  Regimen:  + 2Gy TBI  Donor: son (haplo)   Graft source: BM  - Admitted 9/13/24 for a  TBI PT Cy haplo (son) allogeneic BMT    Hypertension, essential  - Was holding home Coreg for fluid responsive hypotension. Resumed 9/30.    Coronary artery disease involving native coronary artery of native heart without angina pectoris  - Was holding home Coreg for hypotension. Resumed 9/30.    Physical debility  - PT/OT following while inpatient  - recommending HH with rolling walker  - The mobility limitation cannot be sufficiently resolved by the use of a cane. Patient's functional mobility deficit can be sufficiently resolved with the use of a Rolling Walker. Patient's mobility limitation significantly impairs their ability to participate in one of more activities of daily living.  The use of a Rolling Walker, Walker will significantly improve the patient's ability to participate in MRADLS and the patient will use it on regular basis in the home.        VTE Risk Mitigation (From admission, onward)           Ordered     heparin, porcine (PF) 100 unit/mL injection flush 300 Units  As needed (PRN)         09/13/24  3909                    Disposition: Home with Home health    Celso Baig MD  Bone Marrow Transplant  [facility] - Oncology ([facility])

## 2024-10-16 NOTE — DISCHARGE SUMMARY
Janusz Ma - Oncology ([facility])  Hematology  Bone Marrow Transplant  Discharge Summary      Patient Name: Guillaume Salinas  MRN: [id number]  Admission Date: 9/13/2024  Hospital Length of Stay: 33 days  Discharge Date and Time:  10/16/2024 6:43 PM  Attending Physician: Mohit Hickey MD   Discharging Provider: Celso Baig MD  Primary Care Provider: Kal Hargrove MD    HPI: 70 y/o PMH MDS, PVD, CAD, HTN admitted for haploSCT for tx of MDS. Reports mild soreness at CVC placement site, otherwise no complaints reported.     * No surgery found *     Hospital Course: 09/14/2024 pt of Dr. Hickey with MDS admitting for FluMel 100 conditiong/haplo SCT. Feeling well today, states his CVC (which was inserted yesterday is irritating him)-appears functional, in proper place, and non infected. Transfusion 1 u pRBC  09/15/2024 Day -4 Flu/shilpi 100 conditioning for HaploSCT. No new complaints or symptoms, transfusing 1u pRBC  09/16/2024 Day -3 Flu/shilpi 100 conditioning for HaploSCT. Patient feels well, offers no new complaints.   09/17/2024: Day -2 from a  TBI PT Cy haplo (son) allogeneic BMT for MDS. Remains afebrile. VSS. Tolerating chemo well thus far. [place] planned on 9/19 at 11 am. Will receive fresh product later that day. Weight up 5 lbs from admission, but patient does not appear volume overloaded. Lungs clear. Denies SOB. Will defer diuresis today. Guyanese-speaking  utilized for assessment.  09/18/2024: Day -1 from a  TBI PT Cy haplo (son) allogeneic BMT for MDS. TBI today. Nurse reported that patient c/o chest/epigastric/upper abdominal pain on return from TBI. Stated that patient declined pain med and reported that pain was improving. Do not suspect cardiac etiology but will get EKG and troponin out of an abundance of cautions. Remains afebrile. VSS. Will get BMT tomorrow following son's harvest. Timing and number of cells TBD.  09/19/2024: Day 0 for a  TBI PT Cy haplo  (son) allogeneic BMT for MDS.  Will receive bone marrow today following harvest. Timing and number of cells TBD. Continues to tolerate chemo and TBI well.   09/20/2024 Day +1 for a  TBI PT Cy haplo (son) allogeneic BMT for MDS.  Received 3.55 x 10^6 CD34/kg last night in a fresh transplant. Early morning temp spike to 102.9, infectious workup completed. Conversation today conducted entirely through ipad .  1 loose stool followed by a formed bowel movement.  09/21/2024 Day +2 for a  TBI PT Cy haplo (son) allogeneic BMT for MDS.  Tmax of 101.5 overnight. Feels like he rested better last night. Complains of thigh cramps,that are subsiding without intervention. 1 soft stool today, no liquid stools.   09/22/2024 Day +3 for a  TBI PT Cy haplo (son) allogeneic BMT for MDS.  Tmax of 99.5 overnight. Continues to rest well overnight. 1 headache relieved with pain meds. No further loose stools. Has hesitancy while urinating, feels this is due to trying to pee while laying down, but he does not want to try flomax.  09/24/2024: Day +5 from a  TBI PT Cy haplo (son) allogeneic BMT for MDS. Afebrile over night. VSS. Last fever was > 24 hours ago. Infection w/u neg thus far. Feeling fatigued today. Having loose stools. Stool sample to r/o c-diff pending collection. Of note, has history of c-diff and is receiving ppx with oral vanc.  09/25/2024: Day +6 from a  TBI PT Cy haplo (son) allogeneic BMT for MDS. Remains afebrile. VSS. Last fever was > 48 hours ago. Infection w/u remains unremarkable. Stopping Cefepime and resuming LVQ ppx. Having c-diff neg diarrhea. Starting PRN imodium. C/o headache this morning. Daily coffee drinker. Reports that he did not drink coffee yesterday due to diarrhea. Headache improved after coffee this morning, so suspect caffeine withdrawal. Repleting phos. Encouraged to increase activity.  09/26/2024 Day +7 s/p  + TBI + PT Cy Haploidentical (son) BMT for MDS.  Will have first tacro level tomorrow. Currently on 1mg BID. Afebrile, back on ppx levaquin as of yesterday. Blood cultures from 9/23 still NGTD. Denies n/v. Still with diarrhea. Scheduling imodium and adding prn lomotil. Added anusol and tucks pads for hemorrhoids. Will receive 1unit platelets today. VSS  09/27/2024 Day +8 s/p  + TBI + PT Cy Haploidentical (son) BMT for MDS. Will have first tacro level today 6.3. Continue on 1mg BID. Next level 9/30. Worsening nausea with low appetite, started on IV zofran ATC, continues on IVF. Diarrhea improved with scheduled imodium, still has prn lomotil. Reports new worsening bilateral shoulder pain not controlled with tramadol, started on lidocaine patches and increased pain meds to oxy. Continues drinking coffee for caffeine headaches. Wife reporting frequent urination at night with limited output, will start on flomax. Started melatonin for insomnia/restlessness at night. Replacing K. Afebrile, VSS  9/28/2024 Day +9 /TBI PtCy haploidentical stem cell transplant for MDS. Nausea is minimal this morning. Reports diarrhea stable, about 9 movements in 24 hours. Remains on scheduled immodium with prn lomotil. Shoulder pain better controlled today. Reports hemorrhoids remain significant, using hydrocortisone procto cream.  9/29/2024 Day +10 /TBI PtCy haploidentical stem cell transplant for MDS. Nausea is minimal. Shoulder pain much better, controlled on current therapy. Diarrhea stable- about 9 BM daily, small volume. Hemorrhoid pain significant, but the hydrocortisone cream does provide relief. Reports now dry mouth, blood on lips from dryness/cracking. Using chapstick. Has not been consistent with oral hygiene rinses/regimen- discussed and will do better.  09/30/2024 Day +11 s/p  + TBI + PT Cy Haploidentical (son) BMT for MDS. Tacro level 9.3, remains on 1mg BID. Continues with nausea. Reports improvement in diarrhea, although worsening pain to hemorrhoids.  Shoulder pain improved with lidocaine patches. Restarting home coreg for elevated BP/HR. Replacing K, mag, and phos. Afebrile, VSS  10/01/2024: Day +12 from a  TBI PT Cy haplo (son) BMT for MDS. Remains afebrile. VSS. No evidence of aGVHD. Repleting mag. Main complaints are persistent diarrhea and hemorrhoid pain and perianal excoriation that is worsened by diarrhea. Receiving scheduled imodium and PRN limotil.  Adding Questran in the hopes of bulking stools. Continuing LETs and Tucks. Barrier cream provided.  10/02/2024 Day +13 from a  TBI PT Cy haplo (son) BMT for MDS. He didn't have any bowel movement last night. He complains of dysuria, frequency and urgency. UA ordered. He denies any abdominal pain. No fever. VSS. His left shoulder pain is better. Transfusing 1 unit of platelet to keep >10. He endorses decreased appetite.  10/03/2024: Day +14 from a  TBI PT Cy haplo (son) BMT for MDS.  Remains afebrile. VSS. Transfusing 1 unit prbc for hgb of 6.0. Oral intake, including fluids, remains poor. Continuing IVF. Bilat pedal edema noted. Encouraged patient to elevated lower extremities while in chair. Diarrhea significantly improved with Questran. One small BM over night. Changing loperamide to PRN. Patient aware that he will need to ask for medication. No aGVHD. Wife and daughter at bedside at time of exam. Nigerian-speaking  utilized. Spend good deal of time discussing treatment plan with patient and family via Nigerian-speaking .  10/04/2024: Day +15 from a  TBI PT Cy haplo (son) BMT for MDS.  Remains afebrile. VSS. Transfusing plts for plt count of 9K. Tacro level is 14.2. Holding morning dose of tacro and will resume this evening at a 50% dose reduction (0.5 mg BID). Diarrhea well-controlled with scheduled Questran and PRN imodium. Weight up from admission but down 2 lbs in last 24 hours without diuresis. Will defer diuresis again today. No aGVHD.  10/05/2024 Day +16 from a   TBI PT Cy haplo (son) BMT for MDS.  Remains afebrile. VSS. Full conversation had through online , [place]. He is eating very little. Less than a 10th of a bannana. He feels he would eat more if nausea is better controlled. Diarrhea well-controlled with scheduled Questran and PRN imodium. Weight up from admission but down an additional 2 lbs in last 24 hours without diuresis. Will defer diuresis again today. No aGVHD.  10/06/2024  Day +17 from a  TBI PT Cy haplo (son) BMT for MDS.  Remains afebrile. VSS. Sitting up in recliner today with daughter and wife at bedside. Increased po intake in the last day. Family would like a walker for better stability.  10/07/2024 Day +18 s/p  + TBI + PT Cy Haploidentical (son) BMT for MDS. Tacro level stable at 8.8. Continues on 0.5mg BID.  today. Still with diarrhea although much improved. Continues with bilateral shoulder pain. Up 5lbs in last 24hrs so IVF rate reduced. Replacing mag. Afebrile, VSS  10/08/2024 Day +19 s/p  + TBI + PT Cy Haploidentical (son) BMT for MDS. Continuing tacro at 0.5mg BID.  today. Still with nausea, remains on IV zofran and compazine. Will add zyprexa at night. Still with diarrhea although much improved. Continues with bilateral shoulder pain. Weight down with reduced continuous IVF rate. Replacing mag, increased scheduled dose to 800mg BID. PT/OT recommending HH. Will receive 1unit platelets. Afebrile, VSS  10/09/2024 Day +20 s/p  + TBI + PT Cy Haploidentical (son) BMT for MDS. Day 1 of engraftment today with an ANC of 531. Tacro level 6.4. Increasing dose to 0.5mg AM and 1mg PM. Nausea better with zyprexa. Diarrhea controlled with prn imodium. Reports feels like some large pills get stuck when swallowing; encouraged patient to take slowly with plenty of water. Will stop IVF today. PT/OT recommending HH. Afebrile, VSS  10/10/2024 Day +21 s/p  + TBI + PT Cy Haploidentical (son) BMT for MDS. Day 2 of  engraftment today with an ANC of 1147. Still having difficulty taking large pills, meds changed to liquid as able. If persists, will consider speech eval tomorrow. Patient still with very limited PO intake. Nausea controlled with zyprexa. IV compazine and zofran changed to PO. Still with intermittent SOB although O2 sats WNL. Encouraged ambulation and use of incentive spirometer. Replacing mag. Afebrile, VSS  10/11/2024 - Day +22 s/p  + TBI + PT Cy Haploidentical (son) BMT for MDS. Day 3 of engraftment today with an ANC of 1162. He is Afebrile, VSS. Denies any nausea and vomiting. Complains of diarrhea abt 4x this morning. He is off oxygen. CXR shows some pulmonary edema. Lasix 20mg ordered. Continue to encourage ambulation. Receiving 1 unit of platelets.  10/12/2024 D+23 s/p  + TBI + PT Cy Haploidentical (son) BMT for MDS. Engrafted D+20. ANC 1742 today, stop G-CSF tomorrow if sustained improvement. Continues to have diarrhea, cholestyramine helping somewhat. Encourage imodium prn usage. If no improvement, plan to check C.diff. Tacro 8.3.   10/13/2024 D+24 s/p  + TBI + PT Cy Haploidentical (son) BMT for MDS. Engrafted D+20.  ANC 2930 today, stop G-CSF. Diarrhea persists and worsening. Checking C diff and stool studies. If negative, plan to schedule imodium.   10/14/2024 -  D+25 s/p  + TBI + PT Cy Haploidentical (son) BMT for MDS. Afebrile. VSS. Engrafted D+20. ANC 3775 today. Continues to have loose watery stool and has been started on imodium as C diff is negative. Stool studies pending. Platelet 10 today. 1 unit of platelets ordered. Continue supportive care.   10/15/2024 - D+26 s/p  + TBI + PT Cy Haploidentical (son) BMT for MDS. Afebrile. VSS. Engrafted D+20. ANC 1849 today. Continue to have loose watery stool. About 8 episodes yesterday. Loperamide and questran dose increased. GI panel negative. Stool studies pending. Appetite is sub optimal.  10/16/2024 - D+27 s/p  + TBI + PT Cy  Haploidentical (son) BMT for MDS. Afebrile. VSS. Engrafted D+20. Diarrhea resolved. His bilateral shoulder pain is better. No new complaints. Hgb 6.6, transfusing one unit of prbc. His ANC is trending down after discontinuing GCSF. It has dropped over 50% and will need GCSF at outpatient. He will be discharge with home health PT.    Goals of Care Treatment Preferences:  Code Status: Full Code    Health care agent: Yennifer Thomas  Health care agent number: [phone]          What is most important right now is to focus on avoiding the hospital, remaining as independent as possible, symptom/pain control, curative/life-prolongation (regardless of treatment burdens).  Accordingly, we have decided that the best plan to meet the patient's goals includes continuing with treatment.      Consults (From admission, onward)          Status Ordering Provider     Inpatient consult to Social Work  Once        Provider:  (Not yet assigned)    Acknowledged CHRIS ROSARIO     Inpatient consult to Registered Dietitian/Nutritionist  Once        Provider:  (Not yet assigned)    Completed JASE RUSSO            Significant Diagnostic Studies:     Pending Diagnostic Studies:       Procedure Component Value Units Date/Time    Stool Exam-Ova,Cysts,Parasites [[id number]] Collected: 10/13/24 4576    Order Status: Sent Lab Status: In process Updated: 10/14/24 0612    Specimen: Stool           Final Active Diagnoses:    Diagnosis Date Noted POA    PRINCIPAL PROBLEM:  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] 08/27/2024 Not Applicable    Electrolyte disorder [E87.8] 10/01/2024 Yes    Hypomagnesemia [E83.42] 10/01/2024 Yes    Moderate malnutrition [E44.0] 09/30/2024 Yes    Dry mouth [R68.2] 09/29/2024 Yes    Chemotherapy-induced nausea [R11.0, T45.1X5A] 09/27/2024 Yes    Urinary frequency [R35.0] 09/27/2024 Yes    Hemorrhoids [K64.9] 09/26/2024 Yes    Headache [R51.9] 09/25/2024 Yes    Chemotherapy induced diarrhea [K52.1,  "T45.1X5A] 09/24/2024 Yes    Neutropenic fever [D70.9, R50.81] 09/20/2024 No    Neuropathy [G62.9] 09/17/2024 Yes    History of Clostridioides difficile infection [Z86.19] 09/17/2024 Yes    Insomnia [G47.00] 09/17/2024 Yes    Pancytopenia due to antineoplastic chemotherapy [D61.810, T45.1X5A] 06/29/2023 Yes    Myelodysplastic syndrome [D46.9] 05/12/2023 Yes    Hypertension, essential [I10] 11/29/2021 Yes    Coronary artery disease involving native coronary artery of native heart without angina pectoris [I25.10] 11/22/2021 Yes    Physical debility [R53.81] 11/08/2021 Yes      Problems Resolved During this Admission:      Discharged Condition: stable    Disposition: Home or Self Care    Follow Up:    Patient Instructions:      WALKER FOR HOME USE     Order Specific Question Answer Comments   Type of Walker: Adult (5'4"-6'6")    With wheels? Yes    Height: 5' 9" (1.753 m)    Weight: 76 kg (167 lb 7 oz)    Length of need (1-99 months): 99    Does patient have medical equipment at home? none    Please check all that apply: Patient's condition impairs ambulation.    Please check all that apply: Patient is unable to safely ambulate without equipment.    Please check all that apply: Walker will be used for gait training.      WALKER FOR HOME USE     Order Specific Question Answer Comments   Type of Walker: Adult (5'4"-6'6")    With wheels? Yes    Height: 5' 9" (1.753 m)    Weight: 69 kg (152 lb 1.9 oz)    Length of need (1-99 months): 99    Does patient have medical equipment at home? none    Please check all that apply: Patient's condition impairs ambulation.      Diet Adult Regular     Diet Adult Regular     Case Request Endoscopy: Biopsy-bone marrow     Order Specific Question Answer Comments   Medical Necessity: Medically Urgent [101]    CPT Code: [place] BONE MARROW BIOPSY(IES) W/ASPIRATION(S); DIAGNOSTIC [08731]    Post-Procedure Disposition: Home [30]    Is an on-site pathologist required for this procedure? N/A  "     Activity as tolerated     Medications:  Reconciled Home Medications:      Medication List        START taking these medications      LIDOcaine 5 %  Commonly known as: LIDODERM  Coloque 1 parche sobre la piel ivette vez al día. Retire y deseche el parche dentro de las 12 horas o según las indicaciones del médico. Coloque el parche en el hombro milagros.  (Place 1 patch onto the skin once daily. Remove & Discard patch within 12 hours or as directed by MD. Place patch to left shoulder.)  Replaces: LIDOcaine 5 % Oint ointment     loperamide 2 mg capsule  Commonly known as: IMODIUM  [place] ivette cápsula (2 mg en total) por vía oral 2 (dos) veces al día.  (Take 1 capsule (2 mg total) by mouth 2 (two) times a day.)     magnesium oxide 400 mg (241.3 mg magnesium) tablet  Commonly known as: MAG-OX  [place] 2 tabletas (800 mg en total) por vía oral ivette vez al día.  (Take 2 tablets (800 mg total) by mouth once daily.)     mycophenolate 250 mg Cap  Commonly known as: CELLCEPT  [place] 4 cápsulas (1000 mg en total) por vía oral 3 (sosa) veces al día. PARAR 24 DE OCTUBRE DEL 24  (Take 4 capsules (1,000 mg total) by mouth 3 (three) times daily. STOP 10-24-24)     OLANZapine 5 MG tablet  Commonly known as: ZyPREXA  [place] 1 tableta (5 mg en total) por vía oral todas las noches.  (Take 1 tablet (5 mg total) by mouth every evening.)     ondansetron 8 MG Tbdl  Commonly known as: ZOFRAN-ODT  [place] 1 tableta (8 mg en total) por vía oral cada 8 (ocho) horas.  (DISSOLVE 1 tablet (8 mg total) by mouth every 8 (eight) hours.)     pantoprazole 40 MG tablet  Commonly known as: PROTONIX  Take 1 tablet (40 mg total) by mouth once daily.     prochlorperazine 5 MG tablet  Commonly known as: COMPAZINE  [place] 1 tableta (5 mg en total) por vía oral cada 6 (seis) horas.  (Take 1 tablet (5 mg total) by mouth every 6 (six) hours.)     sulfamethoxazole-trimethoprim 800-160mg 800-160 mg Tab  Commonly known as: BACTRIM DS  [place] 1 tableta por vía oral todos los  lunes, miércoles y viernes. EMPEZAR 21 de OCTUBRE del 24  (Take 1 tablet by mouth every Mon, Wed, Fri. START 10/21/24)  Start taking on: October 21, 2024     tacrolimus 0.5 MG Cap  Commonly known as: PROGRAF  [place] 1 cápsula (0,5 mg en total) por vía oral todas las mañanas Y 2 cápsulas (1 mg en total) todas las noches.  (Take 1 capsule (0.5 mg total) by mouth every morning AND 2 capsules (1 mg total) every evening.)     traMADoL 50 mg tablet  Commonly known as: ULTRAM  [place] 1 tableta (50 mg en total) por vía oral cada 6 (seis) horas según sea necesario para el dolor.  (Take 1 tablet (50 mg total) by mouth every 6 (six) hours as needed for Pain.)     ursodioL 300 mg capsule  Commonly known as: ACTIGALL  [place] 1 cápsula (300 mg en total) por vía oral 2 (dos) veces al día. PARAR 19 de OCTUBRE del 24  (Take 1 capsule (300 mg total) by mouth 2 (two) times daily. STOP 10-19-24)     vancomycin 125 mg/5 mL Soln  Take 5 mLs (125 mg total) by mouth 2 (two) times a day.  Replaces: vancomycin 125 MG capsule     ZARXIO 300 mcg/0.5 mL Syrg  Generic drug: filgrastim-sndz  Inject 0.5 mLs (300 mcg total) into the skin once daily.            CHANGE how you take these medications      acyclovir 800 MG Tab  Commonly known as: ZOVIRAX  [place] ivette tableta (800 mg en total) por vía oral 2 veces al día.  (Take 1 tablet (800 mg total) by mouth 2 (two) times daily.)  What changed:   medication strength  how much to take            CONTINUE taking these medications      carvediloL 6.25 MG tablet  Commonly known as: COREG  Take 1 tablet (6.25 mg total) by mouth 2 (two) times daily.     gabapentin 300 MG capsule  Commonly known as: NEURONTIN  [place] 2 cápsulas (600 mg en total) por vía oral 2 (dos) veces al día.  (Take 2 capsules (600 mg total) by mouth 2 (two) times daily.)     letermovir 480 mg Tab  Commonly known as: PREVYMIS  [place] 1 tableta (480 mg en total) por vía oral al día.  (Take 1 tablet (480 mg total) by mouth Daily.)     posaconazole 100  mg Tbec tablet  Commonly known as: NOXAFIL  [place] 3 tabletas por via oral ivette vez al markus  (Take 3 tablets (300 mg total) by mouth once daily.)     traZODone 50 MG tablet  Commonly known as: DESYREL  [place] 1 tableta (50 mg en total) por vía oral todas las noches según sea necesario para el insomnio.  (Take 1 tablet (50 mg total) by mouth nightly as needed for Insomnia.)            STOP taking these medications      levoFLOXacin 500 MG tablet  Commonly known as: LEVAQUIN     LIDOcaine 5 % Oint ointment  Commonly known as: XYLOCAINE  Replaced by: LIDOcaine 5 %     vancomycin 125 MG capsule  Commonly known as: VANCOCIN  Replaced by: vancomycin 125 mg/5 mL Soln     voriconazole 200 MG Tab  Commonly known as: KARENA Baig MD  Bone Marrow Transplant  [facility] - Oncology ([facility])

## 2024-10-16 NOTE — PLAN OF CARE
Assumed care of pt. 06:45-19:15  Pt involved in plan of care and communicating needs throughout shift.      - D +27 Haplo Allo SCT for MDS  - AAOx4; Maori speaking  - No complaints of pain throughout shift  - Ambulates SBA using rolling walker at bedside;   - Reg. Diet; RA  - Remains afebrile  - Voids via RR; No BM this shift  - Skin intact; LETS given for Hemorrhoids  - R IJ vaughn sterile dsg changed   - 1 unit PRBC administered; Type & Screen verified; Consents in chart; Premedicated as ordered; 2 RN verification per protocol; Pt. Tolerated well w/NADN;     - q1h patient rounds. All VSS; no acute events so far this shift. Non skid socks on; Pt. Instructed to call if any assistance is needed. Pt remaining free from falls or injury; Bed locked in lowest position with bed alarm on and audible; side rails up x3 & all belongings including call light within reach; Instructed to call prior to getting OOB ; Plan of care ongoing; All needs met at this time.        AVS, medications, follow up appt. Reviewed with pt. And family at bedside. All parties verbalized understanding with all questions answered. Home meds delivered to bedside via pharmacy; Per MD pts. R IJ vaughn to remain in place upon d/c. Awaiting hospital transport.

## 2024-10-16 NOTE — PROGRESS NOTES
The patient will be discharged to home today with home health through [facility]. The patient is scheduled to be seen tomorrow, 10/17/2024.   The patient's walker was delivered to his room.  The  sent a Retail Pharmacy Cost Transfer to cover the patient's medication in the amount of $342.85..  The patient will be transported home by his family.

## 2024-10-16 NOTE — PLAN OF CARE
Problem: Adult Inpatient Plan of Care  Goal: Plan of Care Review  Outcome: Progressing  Goal: Patient-Specific Goal (Individualized)  Outcome: Progressing  Goal: Absence of Hospital-Acquired Illness or Injury  Outcome: Progressing  Goal: Optimal Comfort and Wellbeing  Outcome: Progressing  Goal: Readiness for Transition of Care  Outcome: Progressing     Problem: Infection  Goal: Absence of Infection Signs and Symptoms  Outcome: Progressing     Problem: Wound  Goal: Absence of Infection Signs and Symptoms  Outcome: Progressing  Goal: Optimal Pain Control and Function  Outcome: Progressing  Goal: Skin Health and Integrity  Outcome: Progressing  Goal: Optimal Wound Healing  Outcome: Progressing     Problem: Stem Cell/Bone Marrow Transplant  Goal: Optimal Coping with Transplant  Outcome: Progressing  Goal: Symptom-Free Urinary Elimination  Outcome: Progressing  Goal: Diarrhea Symptom Control  Outcome: Progressing  Goal: Improved Activity Tolerance  Outcome: Progressing  Goal: Blood Counts Within Acceptable Range  Outcome: Progressing  Goal: Absence of Hypersensitivity Reaction  Outcome: Progressing  Goal: Absence of Infection  Outcome: Progressing  Goal: Improved Oral Mucous Membrane Health and Integrity  Outcome: Progressing  Goal: Nausea and Vomiting Symptom Relief  Outcome: Progressing  Goal: Optimal Nutrition Intake  Outcome: Progressing     Problem: Fall Injury Risk  Goal: Absence of Fall and Fall-Related Injury  Outcome: Progressing     Problem: Skin Injury Risk Increased  Goal: Skin Health and Integrity  Outcome: Progressing     Problem: Oral Intake Inadequate  Goal: Improved Oral Intake  Description:   Goals: Meet % EEN, EPN by RD f/u date  Nutrition Goal Status: progressing towards goal  Communication of RD Recs: other (comment) (POC)    Outcome: Progressing

## 2024-10-16 NOTE — PROGRESS NOTES
Discharge teaching done with patient and patient's caregiver on BMT unit.  Bulgarian-speaking  utilized. Approximately 1 hour, 15 minutes spent on discussion of post-transplant guidelines including:  Low microbial diet, safe food handling, dining out, home sanitation, outpatient plan of care, progression of recovery of cells and immune system, ways to prevent infection, fatigue, ways to avoid bleeding, pet and plant exposure and care, returning to work, smoking and alcohol consumption, sexual activity, sexual desire and response, immunizations, traveling, nutrition, personal hygiene, hand washing, oral care, eye care, signs and symptoms to report immediately, and emotional changes post transplant.  Also discussed who to contact during business hours, after hours, and holidays.  Written materials supplied.  Patient and family given the opportunity to ask questions.  Verbalizes understanding.  All questions answered to their satisfaction.  Patient and family instructed to call with any questions or concerns.  Patient and caregiver were given handouts which reiterate all the above teaching.     Luz Napoles, ABILIOP  Hematology/Oncology/Bone Marrow Transplant

## 2024-10-16 NOTE — PLAN OF CARE
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]  Not Applicable    Electrolyte disorder [E87.8]  Yes    Hypomagnesemia [E83.42]  Yes    Moderate malnutrition [E44.0]  Yes    Dry mouth [R68.2]  Yes    Chemotherapy-induced nausea [R11.0, T45.1X5A]  Yes    Urinary frequency [R35.0]  Yes    Hemorrhoids [K64.9]  Yes    Headache [R51.9]  Yes    Chemotherapy induced diarrhea [K52.1, T45.1X5A]  Yes    Neutropenic fever [D70.9, R50.81]  No    Neuropathy [G62.9]  Yes    History of Clostridioides difficile infection [Z86.19]  Yes    Insomnia [G47.00]  Yes    Pancytopenia due to antineoplastic chemotherapy [D61.810, T45.1X5A]  Yes    Myelodysplastic syndrome [D46.9]  Yes    Hypertension, essential [I10]  Yes    Coronary artery disease involving native coronary artery of native heart without angina pectoris [I25.10]  Yes    Physical debility [R53.81]  Yes      Resolved Hospital Problems   No resolved problems to display.       Follow Up Appointments:  Future Appointments   Date Time Provider Department Center   10/17/2024  8:30 AM INJE[facility]   10/17/2024  2:40 PM Sondra Jimenez PA-C [facility] BMT [facility]   10/21/2024  9:30 AM INJECTION, [facility]   10/21/2024 10:10 AM Sondra Jimenez PA-C [facility] HC BMT [facility]   10/23/2024 11:00 AM Madalyn Mathis MD [facility] PLMDBEN [facility]   10/24/2024  9:45 AM INJECTION, [facility]   10/24/2024 10:50 AM Sondra Jimenez PA-C [facility] BMT [facility]   10/28/2024  9:15 AM  INJECTION, [facility]   10/28/2024 10:20 AM Jose M Kruse MD [facility] BMT [facility]   10/31/2024  9:00 AM INJECTION, [facility]   10/31/2024  9:40 AM Jose M Kruse MD [facility] BEN[facility]M [facility]   11/4/2024  8:15 AM INJECTION, [facility]   11/4/2024  8:30 AM Mohit Hickey MD [facility] BMT [facility]   11/7/2024  8:15 AM INJECTION, [facility]   11/7/2024  8:30 AM Mohit Hickey MD [facility] BMT [facility]   11/11/2024  8:00 AM INJECTION, [facility]   11/11/2024  8:30 AM Mohit Hickey MD [facility] BMT [facility]   11/14/2024  8:30 AM INJECTION, [facility]   11/14/2024  9:20 AM Jose M Kruse MD [facility]   11/18/2024  8:00 AM INJECTION, [facility]   11/18/2024  9:00 AM Jose M Kruse MD [facility] BMT [facility]   11/19/2024 10:20 AM Harry Diamond MD [facility] HEM ONC [facility]   11/21/2024  8:15 AM INJECTION, [facility]   11/21/2024  9:00 AM Jose M Kruse MD [facility]   11/25/2024  8:15 AM INJECTION, [facility]   11/25/2024  9:20 AM Jose M Kruse MD [facility] BMT [facility]   11/27/2024  8:00 AM INJECTION, [facility]   11/27/2024  8:50 AM Sondra Jimenez, PA-C [facility] BMT [facility]   12/2/2024  8:00 AM INJECTION, [facility]   12/2/2024  8:30 AM Mohit Hickey, MD [facility] BMT [facility]   12/5/2024  8:00 AM INJECTION, [facility]   12/5/2024  9:00 AM Mohit Hickey, MD [facility] BMT [facility]   12/9/2024  8:00 AM INJECTION, [facility]   12/9/2024  9:00 AM Mohit Hickey MD [facility] BMT [facility]   12/12/2024  8:00 AM INJECTION, [facility]   12/12/2024   9:00 AM Mohit Hickey MD [facility] BMT [facility]   12/16/2024  8:00 AM INJECTION, [facility]   12/16/2024  9:00 AM Mohit Hickey MD [facility] BMT [facility]   12/19/2024  8:00 AM INJECTION, [facility] INF[facility]   12/19/2024  9:00 AM Mohit Hickey MD [facility] BMT [facility]   12/23/2024  8:00 AM INJECTION, [facility] INF[facility]ION [facility]   12/23/2024  9:00 AM Jose M Kruse MD [facility] BMT [facility]   12/26/2024  8:45 AM INJECTION, [facility] INF[facility]   12/26/2024  9:40 AM Jose M Kruse MD [facility]       Allergies:Review of patient's allergies indicates:  No Known Allergies    Medications: Review discharge medications with patient and family and provide education.    Current Facility-Administered Medications   Medication Dose Route Frequency Provider Last Rate Last Admin    0.9%  NaCl infusion (for blood administration)   Intravenous Q24H PRN Janny Lockett MD        0.9%  NaCl infusion (for blood administration)   Intravenous Q24H PRN Janny Lockett MD        0.9%  NaCl infusion (for blood administration)   Intravenous Q24H PRN Celso Baig MD        acyclovir suspension 800 mg  800 mg Oral BID Judy Anthony NP   800 mg at 10/16/24 0955    alteplase injection 2 mg  2 mg Intra-Catheter PRN Mohit Hickey MD        artificial tears 0.5 % ophthalmic solution 2 drop  2 drop Both Eyes PRN Judy Anthony NP        carvediloL tablet 6.25 mg  6.25 mg Oral BID Judy Anthony NP   6.25 mg at 10/16/24 0957    cholestyramine 4 gram packet 8 g  2 packet Oral BID Celso Baig MD   8 g at 10/16/24 0958    EPINEPHrine (PF) injection 0.5 mg  0.5 mg Intramuscular Once PRN Luz Napoles NP        And    diphenhydrAMINE injection 50 mg  50 mg Intravenous Once PRN Luz Napoles NP        And    furosemide injection 100 mg  100 mg Intravenous Once PRN Dony, Luz DINH, NP         And    cloNIDine tablet 0.1 mg  0.1 mg Oral Once PRN Luz Napoles NP        And    nitroGLYCERIN SL tablet 0.4 mg  0.4 mg Sublingual Once PRN Luz Napoles NP        diphenhydrAMINE capsule 50 mg  50 mg Oral PRN Judy Anthony NP   50 mg at 10/16/24 0945    diphenhydrAMINE injection 25 mg  25 mg Intravenous Q10 Min PRN Mohit Hickey MD        diphenoxylate-atropine 2.5-0.025 mg per tablet 1 tablet  1 tablet Oral QID PRN Judy Anthony, NP   1 tablet at 10/15/24 0709    EPINEPHrine (PF) injection 0.3 mg  0.3 mg Intramuscular Daily PRN Mohit Hickey MD        gabapentin 250 mg/5 mL (5 mL) solution 600 mg  600 mg Oral BID Judy Anthony, NP   600 mg at 10/16/24 0955    heparin, porcine (PF) 100 unit/mL injection flush 300 Units  300 Units Intravenous PRN Mohit Hickey MD        hydrocortisone 2.5 % rectal cream   Rectal BID Judy Anthony NP   Given at 10/16/24 0958    k phos di & mono-sod phos mono 250 mg tablet 1 tablet  1 tablet Oral Q4H PRN Judy Anthony, NP   1 tablet at 10/08/24 1300    k phos di & mono-sod phos mono 250 mg tablet 2 tablet  2 tablet Oral Q4H PRN Judy Anthony NP   2 tablet at 09/25/24 1709    letermovir Tab 480 mg  480 mg Oral Daily Mohit Hickey MD   480 mg at 10/16/24 0959    LETS (LIDOcaine-TETRAcaine-EPINEPHrine) gel solution   Topical (Top) BID Luz Napoles NP   Given at 10/16/24 0955    LIDOcaine 5 % patch 1 patch  1 patch Transdermal Q24H Judy Anthony, NP   1 patch at 10/16/24 0958    LIDOcaine 5 % patch 1 patch  1 patch Transdermal Q24H Judy Anthony, NP   1 patch at 10/16/24 0958    loperamide capsule 2 mg  2 mg Oral BID Celso Baig MD   2 mg at 10/16/24 0957    LORazepam injection 1 mg  1 mg Intravenous Q6H PRN Mohit Hickey MD   1 mg at 10/10/24 1745    methylPREDNISolone sodium succinate injection 125 mg  125 mg Intravenous Daily PRN Mohit Hickey MD        mycophenolate  mofetil 200 mg/mL suspension 1,000 mg  1,000 mg Oral TID Judy Anthony, NP   1,000 mg at 10/16/24 0955    OLANZapine tablet 5 mg  5 mg Oral QHS Judy Anthony, NP   5 mg at 10/15/24 2032    ondansetron disintegrating tablet 8 mg  8 mg Oral Q8H Judy Anthony, NP   8 mg at 10/16/24 0639    oxyCODONE immediate release tablet 5 mg  5 mg Oral Q4H PRN Judy Anthony, ALLYSSA        pantoprazole EC tablet 40 mg  40 mg Oral Daily Judy Anthony, NP   40 mg at 10/16/24 0959    posaconazole EC tablet 300 mg  300 mg Oral Daily Mohit Hickey MD   300 mg at 10/16/24 0957    potassium chloride SA CR tablet 20 mEq  20 mEq Oral PRN Judy Anthony, NP   20 mEq at 10/16/24 0639    potassium chloride SA CR tablet 20 mEq  20 mEq Oral Q2H PRN Judy Anthony, NP   20 mEq at 10/15/24 0921    potassium chloride SA CR tablet 20 mEq  20 mEq Oral Q2H PRN Judy Anthony, ALLYSSA        prochlorperazine injection Soln 5 mg  5 mg Intravenous Q6H Judy Anthnoy, NP   5 mg at 10/16/24 0639    sodium bicarb-sodium chloride powder 1 Dose  1 Dose Swish & Spit QID Mohit Hickey MD   1 Dose at 10/16/24 0957    sodium chloride 0.9% flush 10 mL  10 mL Intravenous PRN Mohit Hickey MD   10 mL at 09/14/24 1247    [START ON 10/19/2024] sulfamethoxazole-trimethoprim 800-160mg per tablet 1 tablet  1 tablet Oral Every Mon, Wed, Fri Mohit Hickey MD        tacrolimus capsule 0.5 mg  0.5 mg Oral Daily AM Luz Napoles NP   0.5 mg at 10/16/24 0827    tacrolimus capsule 1 mg  1 mg Oral Daily PM Judy Anthony NP   1 mg at 10/15/24 1723    traMADoL tablet 50 mg  50 mg Oral Q6H Celso Baig MD   50 mg at 10/16/24 0639    traZODone tablet 50 mg  50 mg Oral QHS Judy Anthony NP   50 mg at 10/15/24 2032    ursodiol oral suspension (conc: 60 mg/mL) 300 mg  300 mg Oral BID Judy Anthony NP   300 mg at 10/16/24 0945    vancomycin 125 mg/5 mL oral solution 125 mg  125 mg Oral BID  Mohit Hickey MD   125 mg at 10/16/24 0956        Medication List        START taking these medications      * LIDOcaine 5 %  Commonly known as: LIDODERM  Place 1 patch onto the skin once daily. Remove & Discard patch within 12 hours or as directed by MD. Place patch to left shoulder.  Replaces: LIDOcaine 5 % Oint ointment     * LIDOcaine 5 %  Commonly known as: LIDODERM  Place 1 patch onto the skin once daily. Remove & Discard patch within 12 hours or as directed by MD. Place patch to right shoulder.     * loperamide 2 mg capsule  Commonly known as: IMODIUM  Take 1 capsule (2 mg total) by mouth 3 (three) times daily as needed for Diarrhea.     * loperamide 2 mg capsule  Commonly known as: IMODIUM  Take 1 capsule (2 mg total) by mouth 2 (two) times a day.     mycophenolate 250 mg Cap  Commonly known as: CELLCEPT  Take 4 capsules (1,000 mg total) by mouth 3 (three) times daily. for 20 days     pantoprazole 40 MG tablet  Commonly known as: PROTONIX  Take 1 tablet (40 mg total) by mouth once daily.     sulfamethoxazole-trimethoprim 800-160mg 800-160 mg Tab  Commonly known as: BACTRIM DS  Take 1 tablet by mouth every Mon, Wed, Fri. To start on 10/21/24  Start taking on: October 21, 2024     tacrolimus 0.5 MG Cap  Commonly known as: PROGRAF  Take 2 capsules (1 mg total) by mouth once daily AND 2 capsules (1 mg total) every evening.     traMADoL 50 mg tablet  Commonly known as: ULTRAM  Take 1 tablet (50 mg total) by mouth every 6 (six) hours as needed for Pain.     ursodioL 300 mg capsule  Commonly known as: ACTIGALL  Take 1 capsule (300 mg total) by mouth 2 (two) times daily. for 15 days     vancomycin 125 mg/5 mL Soln  Take 5 mLs (125 mg total) by mouth 2 (two) times a day.  Replaces: vancomycin 125 MG capsule     ZARXIO 300 mcg/0.5 mL Syrg  Generic drug: filgrastim-sndz  Inject 0.5 mLs (300 mcg total) into the skin once daily.           * This list has 4 medication(s) that are the same as other medications  prescribed for you. Read the directions carefully, and ask your doctor or other care provider to review them with you.                CHANGE how you take these medications      acyclovir 800 MG Tab  Commonly known as: ZOVIRAX  [place] ivette tableta (800 mg en total) por vía oral 2 veces al día.  (Take 1 tablet (800 mg total) by mouth 2 (two) times daily.)  What changed:   medication strength  how much to take            CONTINUE taking these medications      carvediloL 6.25 MG tablet  Commonly known as: COREG  Take 1 tablet (6.25 mg total) by mouth 2 (two) times daily.     gabapentin 300 MG capsule  Commonly known as: NEURONTIN  Take 2 capsules (600 mg total) by mouth 2 (two) times daily.     letermovir 480 mg Tab  Commonly known as: PREVYMIS  Take 480 mg by mouth Daily.     posaconazole 100 mg Tbec tablet  Commonly known as: NOXAFIL  Take 3 tablets (300 mg total) by mouth once daily.     traZODone 50 MG tablet  Commonly known as: DESYREL  Take 1 tablet (50 mg total) by mouth nightly as needed for Insomnia.            STOP taking these medications      levoFLOXacin 500 MG tablet  Commonly known as: LEVAQUIN     LIDOcaine 5 % Oint ointment  Commonly known as: XYLOCAINE  Replaced by: LIDOcaine 5 %     vancomycin 125 MG capsule  Commonly known as: VANCOCIN  Replaced by: vancomycin 125 mg/5 mL Soln     voriconazole 200 MG Tab  Commonly known as: VFEND                I have seen and examined this patient within the last 30 days. My clinical findings that support the need for the home health skilled services and home bound status are the following:no   Weakness/numbness causing balance and gait disturbance due to Malignancy/Cancer making it taxing to leave home.     Diet:   regular diet    Labs:      Referrals/ Consults  Physical Therapy to evaluate and treat. Evaluate for home safety and equipment needs; Establish/upgrade home exercise program. Perform / instruct on therapeutic exercises, gait training, transfer training, and  Range of Motion.    Activities:   activity as tolerated    Nursing:   Agency to admit patient within 24 hours of hospital discharge unless specified on physician order or at patient request    SN to complete comprehensive assessment including routine vital signs. Instruct on disease process and s/s of complications to report to MD. Review/verify medication list sent home with the patient at time of discharge  and instruct patient/caregiver as needed. Frequency may be adjusted depending on start of care date.     Skilled nurse to perform up to 3 visits PRN for symptoms related to diagnosis    Notify MD if SBP > 160 or < 90; DBP > 90 or < 50; HR > 120 or < 50; Temp > 101; O2 < 88%; Other:       Ok to schedule additional visits based on staff availability and patient request on consecutive days within the home health episode.    When multiple disciplines ordered:    Start of Care occurs on Sunday - Wednesday schedule remaining discipline evaluations as ordered on separate consecutive days following the start of care.    Thursday SOC -schedule subsequent evaluations Friday and Monday the following week.     Friday - Saturday SOC - schedule subsequent discipline evaluations on consecutive days starting Monday of the following week.    For all post-discharge communication and subsequent orders please contact patient's primary care physician. If unable to reach primary care physician or do not receive response within 30 minutes, please contact nursing hotline for clinical staff order clarification    Miscellaneous   Routine Skin for Bedridden Patients: Instruct patient/caregiver to apply moisture barrier cream to all skin folds and wet areas in perineal area daily and after baths and all bowel movements.    Home Health Aide:  Physical Therapy Three times weekly    Wound Care Orders  no    I certify that this patient is confined to his home and needs physical therapy.

## 2024-10-16 NOTE — SUBJECTIVE & OBJECTIVE
Subjective:     Interval History:  D+27 s/p  + TBI + PT [facility] (son) BMT for MDS. Afebrile. VSS. Engrafted D+20. Diarrhea resolved. His bilateral shoulder pain is better. No new complaints. Hgb 6.6, transfusing one unit of prbc. His ANC is trending down after discontinuing GCSF. It has dropped over 50% and will need GCSF at outpatient. He will be discharge with home health PT.    Objective:     Vital Signs (Most Recent):  Temp: 98.2 °F (36.8 °C) (10/16/24 0445)  Pulse: 85 (10/16/24 0445)  Resp: 16 (10/16/24 0639)  BP: (!) 110/53 (10/16/24 0445)  SpO2: (!) 92 % (10/16/24 0445) Vital Signs (24h Range):  Temp:  [97.5 °F (36.4 °C)-98.4 °F (36.9 °C)] 98.2 °F (36.8 °C)  Pulse:  [82-87] 85  Resp:  [16-18] 16  SpO2:  [90 %-94 %] 92 %  BP: (103-136)/(51-74) 110/53     Weight: 69 kg (152 lb 1.9 oz)  Body mass index is 22.46 kg/m².  Body surface area is 1.83 meters squared.    ECOG SCORE           [unfilled]    Intake/Output - Last 3 Shifts         10/14 0700  10/15 0659 10/15 0700  10/16 0659 10/16 0700  10/17 0659    P.O. 425 550     Blood       Total Intake(mL/kg) 425 (6.2) 550 (8)     Urine (mL/kg/hr) 800 (0.5) 550 (0.3)     Stool 0 0     Total Output 800 550     Net -375 0            Urine Occurrence 1 x 3 x     Stool Occurrence 3 x 1 x              Physical Exam  Vitals and nursing note reviewed.   Constitutional:       Appearance: Normal appearance. He is well-developed.      Comments: Frail    HENT:      Head: Normocephalic and atraumatic.      Nose: Nose normal.      Mouth/Throat:      Mouth: Mucous membranes are dry.      Pharynx: No oropharyngeal exudate or posterior oropharyngeal erythema.   Eyes:      General:         Right eye: No discharge.         Left eye: No discharge.      Extraocular Movements: Extraocular movements intact.      Conjunctiva/sclera: Conjunctivae normal.   Cardiovascular:      Rate and Rhythm: Normal rate and regular rhythm.      Heart sounds: Normal heart sounds. No murmur  heard.  Pulmonary:      Effort: Pulmonary effort is normal. No respiratory distress.      Breath sounds: Normal breath sounds. No wheezing or rales.   Abdominal:      General: Bowel sounds are normal. There is no distension.      Palpations: Abdomen is soft.      Tenderness: There is no abdominal tenderness.   Musculoskeletal:         General: No deformity. Normal range of motion.      Cervical back: Normal range of motion and neck supple.      Right lower leg: No edema.      Left lower leg: No edema.   Skin:     General: Skin is warm and dry.      Findings: Bruising present. No erythema or rash.      Comments: Right chest wall vas cath. Dressing c/d/i. No sign of infection to site.   Neurological:      General: No focal deficit present.      Mental Status: He is alert and oriented to person, place, and time.   Psychiatric:         Mood and Affect: Mood normal.         Behavior: Behavior normal.         Thought Content: Thought content normal.         Judgment: Judgment normal.            Significant Labs:   All pertinent labs from the last 24 hours have been reviewed.    Diagnostic Results:  I have reviewed and interpreted all pertinent imaging results/findings within the past 24 hours.

## 2024-10-16 NOTE — PROGRESS NOTES
BMT Pharmacist Discharge Note     All discharge medications were reviewed with the patient and wife. Medications were sent to the [facility] pharmacy for bedside delivery.    Bactrim prescription is still at retail pharmacy at this time. This was discussed with patient and he is aware to  closer to day +30.     Medicamentos Indicación Mañana Tarde Nocjuan   **Acyclovir 800mg  Prevención de infecciones  virales 1 tableta  1 tableta   **Letermovir (Prevymis®) 480mg Prevención de infección por CMV 1 tableta     **Posaconazole 100mg Prevencón de infecciones fungales 3 tabletas     **Sulfamethoxazole-trimethoprim (Bactrim®) 800-160mg Fungal pneumonia prevention (Comience a ansley el 10/21/24) 1 tableta los lunes, miércoles y viernes      **Mycophenolate 250mg Prevención de la enfermedad de injerto contra huésped (GVHD por lopez siglas en inglés)  (Hasta 10/24/24) 4 cápsulas 4 cápsulas 4 c?ápsulas   **Tacrolimus 0.5mg Prevención de GVHD - NO tome la dosis de por la mañana en días que se relizará laboratorios  1 cápsula  2 cápsulas   **Ursodiol 300mg Protección del hígado (Hasta el 10/19/24) 1 cápsula  1 cápsula   **Magnesium oxide 400mg Suplemento de magnesio 2 tabletas        Carvedilol (Coreg®) 6.25 mg Presión arterial maeve  1 tableta  1 tableta   **Gabapentin (Neurontin®) 600 mg Neuropatía 2 cápsulas  2 cápsulas   **Olanzapine (Zyprexa®) 5 mg  Náuseas/Vómito   1 tableta   Vancomycin oral  125 mg Prevención de diarrea infecciosa 1 cápsula  1 cápsula   Trazodone 50 mg Insomnia   1 tableta   Tamsulosin (Flomax®) 0.4 mg Frecuencia urinaria 1 tableta     **Ondansetron (Zofran®) 8 mg Náuseas/Vómito 1 tableta 1 tableta 1 tableta   **Prochlorperazine (Compazine®)  5 mg Náuseas/Vómito [place] 1 tableta por boca cada 6 horas    **Lidocaine patch (Lidoderm®) 5% Dolor en los hombros 1 parcho en el hombro milagros y  1 parcho en el hombro derecho  Remueva luego de las 12 horas     Loperamida (Imodium®) 2 mg Diarrhea 1 cápsula  1  cápsula   Filgrastim (Zarxio) 300 mcg  Para incrementar el sistema inmune (x5 días) 1 inyección     **medicamento nuevo o cambios realizados al medicamento    MEDICAMENTOS CUANDO VI NECESARIOS:  ** Tramadol (Ultram®) 50 mg cada 6 horas cuando sea necesario para el dolor  ** Trazodone ( Desyrel) 50 mg por boca cada noche cuando sea necesario para la insomnia          Notes:   Introduced post-transplant pharmacy services to the patient and wife. I discussed that they will be seen twice a month (first appointment 10/17/24) up until day +100, and then as needed from that point on. I personally checked the bedside delivered medications and patient has all medications at this time. I asked that the patient bring his medication tote to each clinic appointment, that way if there were any medication questions they could more easily be answered by the pharmacist.      I discussed the importance of taking acyclovir up until day +365 to prevent viral infections. Also advised patient to take all electrolyte replacements as prescribed until the bottle is completed. I explained that the dose and frequency of tacrolimus might change at each appointment, so to use the medication sheet provided for dosing instructions rather than what is printed on the bottle label. Patient and wife understand not to take tacrolimus in the morning before lab draws and that he may take his medication after we have drawn a tacrolimus level. Side effects of tacrolimus were discussed and he understands to call clinic if he begins to experience headache or tremor. Patient understands the importance of taking posaconazole and Bactrim as long as he is on immunosuppression. **Letermovir should remain in the blister pack until administration is needed; it should not be placed in the pill box with other medications.         All questions were answered.      Terese Cantor, PharmD  Clinical Pharmacy Specialist, Bone Marrow  Transplant/Hematology  Spectra link: [phone]

## 2024-10-16 NOTE — ASSESSMENT & PLAN NOTE
- Patient of Dr. 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 Today is Day +27  Engrafted D+20 (10/9) with ANC of 531  - Tacro level 7.8 on 10/16; continue dose of 0.5mg AM and 1mg PM.   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 None noted thus far.  - Patient is B+. Donor is A+.  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 Patient will start letermovir ppx on 9/24/24.  - Received fresh BMT product on 9/19/24 with a CD34 dose of 3.55 x10^6.  - Of note, cilostazol may interact with tacro. (Currently on hold for plts < 50K).  - See treatment plan below:    Planned conditioning regimen:  Fludarabine on Day -5, -4, -3, and -2  Melphalan on Day -2  TBI on Day -1     Planned  GVHD Prophylaxis:  Cyclophosphamide (with [place]) on Days +3 and +4  Mycophenolate starting on Day +5  Tacrolimus starting on Day +5     Antimicrobial Prophylaxis:  Acyclovir starting on Day -5  Levofloxacin starting on Day -1  Micafungin on Day -1 through Day +4  Letermovir starting on Day +5 (if CMV PCR drawn on day 0 results negative)  Posaconazole starting on Day +5  Bactrim starting on Day +30     SOS/VOD Prophylaxis:  Ursodiol on Day -5 through Day +30      Growth Factor Support:  Neupogen starting on Day +5, stopped D+24. Engrafted D+20     Caregiver: wife   Post-transplant discharge plans: home

## 2024-10-17 ENCOUNTER — OFFICE VISIT (OUTPATIENT)
Dept: HEMATOLOGY/ONCOLOGY | Facility: CLINIC | Age: 69
End: 2024-10-17
Payer: MEDICARE

## 2024-10-17 ENCOUNTER — TELEPHONE (OUTPATIENT)
Dept: HEMATOLOGY/ONCOLOGY | Facility: CLINIC | Age: 69
End: 2024-10-17

## 2024-10-17 ENCOUNTER — LAB VISIT (OUTPATIENT)
Dept: LAB | Facility: HOSPITAL | Age: 69
End: 2024-10-17
Attending: INTERNAL MEDICINE
Payer: MEDICARE

## 2024-10-17 ENCOUNTER — PATIENT MESSAGE (OUTPATIENT)
Dept: HEMATOLOGY/ONCOLOGY | Facility: CLINIC | Age: 69
End: 2024-10-17

## 2024-10-17 ENCOUNTER — TELEPHONE (OUTPATIENT)
Dept: PALLIATIVE MEDICINE | Facility: CLINIC | Age: 69
End: 2024-10-17
Payer: MEDICARE

## 2024-10-17 VITALS
HEIGHT: 69 IN | RESPIRATION RATE: 18 BRPM | WEIGHT: 158.31 LBS | HEART RATE: 86 BPM | BODY MASS INDEX: 23.45 KG/M2 | SYSTOLIC BLOOD PRESSURE: 110 MMHG | OXYGEN SATURATION: 94 % | DIASTOLIC BLOOD PRESSURE: 55 MMHG

## 2024-10-17 DIAGNOSIS — D84.822 IMMUNOCOMPROMISED STATE ASSOCIATED WITH STEM CELL TRANSPLANT: ICD-10-CM

## 2024-10-17 DIAGNOSIS — Z76.82 STEM CELL TRANSPLANT CANDIDATE: ICD-10-CM

## 2024-10-17 DIAGNOSIS — Z94.84 IMMUNOCOMPROMISED STATE ASSOCIATED WITH STEM CELL TRANSPLANT: ICD-10-CM

## 2024-10-17 DIAGNOSIS — D61.818 PANCYTOPENIA: ICD-10-CM

## 2024-10-17 DIAGNOSIS — Z94.81 HISTORY OF ALLOGENEIC BONE MARROW TRANSPLANT: Primary | ICD-10-CM

## 2024-10-17 DIAGNOSIS — T45.1X5A CHEMOTHERAPY-INDUCED NAUSEA AND VOMITING: ICD-10-CM

## 2024-10-17 DIAGNOSIS — E83.42 HYPOMAGNESEMIA: ICD-10-CM

## 2024-10-17 DIAGNOSIS — R11.2 CHEMOTHERAPY-INDUCED NAUSEA AND VOMITING: ICD-10-CM

## 2024-10-17 DIAGNOSIS — A04.72 CLOSTRIDIUM DIFFICILE DIARRHEA: ICD-10-CM

## 2024-10-17 DIAGNOSIS — D46.9 MYELODYSPLASTIC SYNDROME: ICD-10-CM

## 2024-10-17 LAB
ABO + RH BLD: NORMAL
ALBUMIN SERPL BCP-MCNC: 3.4 G/DL (ref 3.5–5.2)
ALP SERPL-CCNC: 69 U/L (ref 55–135)
ALT SERPL W/O P-5'-P-CCNC: 32 U/L (ref 10–44)
ANION GAP SERPL CALC-SCNC: 10 MMOL/L (ref 8–16)
ANISOCYTOSIS BLD QL SMEAR: SLIGHT
AST SERPL-CCNC: 32 U/L (ref 10–40)
BASOPHILS # BLD AUTO: ABNORMAL K/UL (ref 0–0.2)
BASOPHILS NFR BLD: 1 % (ref 0–1.9)
BILIRUB SERPL-MCNC: 0.5 MG/DL (ref 0.1–1)
BLD GP AB SCN CELLS X3 SERPL QL: NORMAL
BUN SERPL-MCNC: 13 MG/DL (ref 8–23)
CALCIUM SERPL-MCNC: 9.2 MG/DL (ref 8.7–10.5)
CHLORIDE SERPL-SCNC: 103 MMOL/L (ref 95–110)
CO2 SERPL-SCNC: 26 MMOL/L (ref 23–29)
CREAT SERPL-MCNC: 0.8 MG/DL (ref 0.5–1.4)
DIFFERENTIAL METHOD BLD: ABNORMAL
EOSINOPHIL # BLD AUTO: ABNORMAL K/UL (ref 0–0.5)
EOSINOPHIL NFR BLD: 0 % (ref 0–8)
ERYTHROCYTE [DISTWIDTH] IN BLOOD BY AUTOMATED COUNT: 12.8 % (ref 11.5–14.5)
EST. GFR  (NO RACE VARIABLE): >60 ML/MIN/1.73 M^2
GLUCOSE SERPL-MCNC: 124 MG/DL (ref 70–110)
HCT VFR BLD AUTO: 26.3 % (ref 40–54)
HGB BLD-MCNC: 9 G/DL (ref 14–18)
HYPOCHROMIA BLD QL SMEAR: ABNORMAL
IMM GRANULOCYTES # BLD AUTO: ABNORMAL K/UL (ref 0–0.04)
IMM GRANULOCYTES NFR BLD AUTO: ABNORMAL % (ref 0–0.5)
LDH SERPL L TO P-CCNC: 191 U/L (ref 110–260)
LYMPHOCYTES # BLD AUTO: ABNORMAL K/UL (ref 1–4.8)
LYMPHOCYTES NFR BLD: 5 % (ref 18–48)
MAGNESIUM SERPL-MCNC: 1.3 MG/DL (ref 1.6–2.6)
MCH RBC QN AUTO: 29.9 PG (ref 27–31)
MCHC RBC AUTO-ENTMCNC: 34.2 G/DL (ref 32–36)
MCV RBC AUTO: 87 FL (ref 82–98)
METAMYELOCYTES NFR BLD MANUAL: 1 %
MONOCYTES # BLD AUTO: ABNORMAL K/UL (ref 0.3–1)
MONOCYTES NFR BLD: 24 % (ref 4–15)
MYELOCYTES NFR BLD MANUAL: 1 %
NEUTROPHILS NFR BLD: 58 % (ref 38–73)
NEUTS BAND NFR BLD MANUAL: 10 %
NRBC BLD-RTO: 0 /100 WBC
PHOSPHATE SERPL-MCNC: 3.6 MG/DL (ref 2.7–4.5)
PLATELET # BLD AUTO: 16 K/UL (ref 150–450)
PLATELET BLD QL SMEAR: ABNORMAL
PMV BLD AUTO: 12.9 FL (ref 9.2–12.9)
POTASSIUM SERPL-SCNC: 3.8 MMOL/L (ref 3.5–5.1)
PROT SERPL-MCNC: 6 G/DL (ref 6–8.4)
RBC # BLD AUTO: 3.01 M/UL (ref 4.6–6.2)
SODIUM SERPL-SCNC: 139 MMOL/L (ref 136–145)
SPECIMEN OUTDATE: NORMAL
URATE SERPL-MCNC: 3.1 MG/DL (ref 3.4–7)
WBC # BLD AUTO: 2.12 K/UL (ref 3.9–12.7)

## 2024-10-17 PROCEDURE — 87799 DETECT AGENT NOS DNA QUANT: CPT | Performed by: INTERNAL MEDICINE

## 2024-10-17 PROCEDURE — 86901 BLOOD TYPING SEROLOGIC RH(D): CPT | Performed by: INTERNAL MEDICINE

## 2024-10-17 PROCEDURE — 3078F DIAST BP <80 MM HG: CPT | Mod: CPTII,S$GLB,, | Performed by: PHYSICIAN ASSISTANT

## 2024-10-17 PROCEDURE — 83735 ASSAY OF MAGNESIUM: CPT | Performed by: INTERNAL MEDICINE

## 2024-10-17 PROCEDURE — 85027 COMPLETE CBC AUTOMATED: CPT | Performed by: INTERNAL MEDICINE

## 2024-10-17 PROCEDURE — 83615 LACTATE (LD) (LDH) ENZYME: CPT | Performed by: INTERNAL MEDICINE

## 2024-10-17 PROCEDURE — 1111F DSCHRG MED/CURRENT MED MERGE: CPT | Mod: CPTII,S$GLB,, | Performed by: PHYSICIAN ASSISTANT

## 2024-10-17 PROCEDURE — 85007 BL SMEAR W/DIFF WBC COUNT: CPT | Performed by: INTERNAL MEDICINE

## 2024-10-17 PROCEDURE — 1160F RVW MEDS BY RX/DR IN RCRD: CPT | Mod: CPTII,S$GLB,, | Performed by: PHYSICIAN ASSISTANT

## 2024-10-17 PROCEDURE — 80053 COMPREHEN METABOLIC PANEL: CPT | Performed by: INTERNAL MEDICINE

## 2024-10-17 PROCEDURE — 80197 ASSAY OF TACROLIMUS: CPT | Performed by: INTERNAL MEDICINE

## 2024-10-17 PROCEDURE — 3074F SYST BP LT 130 MM HG: CPT | Mod: CPTII,S$GLB,, | Performed by: PHYSICIAN ASSISTANT

## 2024-10-17 PROCEDURE — 86900 BLOOD TYPING SEROLOGIC ABO: CPT | Performed by: INTERNAL MEDICINE

## 2024-10-17 PROCEDURE — 84100 ASSAY OF PHOSPHORUS: CPT | Performed by: INTERNAL MEDICINE

## 2024-10-17 PROCEDURE — 84550 ASSAY OF BLOOD/URIC ACID: CPT | Performed by: INTERNAL MEDICINE

## 2024-10-17 PROCEDURE — 86850 RBC ANTIBODY SCREEN: CPT | Performed by: INTERNAL MEDICINE

## 2024-10-17 PROCEDURE — 1101F PT FALLS ASSESS-DOCD LE1/YR: CPT | Mod: CPTII,S$GLB,, | Performed by: PHYSICIAN ASSISTANT

## 2024-10-17 PROCEDURE — 1159F MED LIST DOCD IN RCRD: CPT | Mod: CPTII,S$GLB,, | Performed by: PHYSICIAN ASSISTANT

## 2024-10-17 PROCEDURE — 99215 OFFICE O/P EST HI 40 MIN: CPT | Mod: S$GLB,,, | Performed by: PHYSICIAN ASSISTANT

## 2024-10-17 PROCEDURE — 1125F AMNT PAIN NOTED PAIN PRSNT: CPT | Mod: CPTII,S$GLB,, | Performed by: PHYSICIAN ASSISTANT

## 2024-10-17 PROCEDURE — 99999 PR PBB SHADOW E&M-EST. PATIENT-LVL IV: CPT | Mod: PBBFAC,,, | Performed by: PHYSICIAN ASSISTANT

## 2024-10-17 PROCEDURE — 3288F FALL RISK ASSESSMENT DOCD: CPT | Mod: CPTII,S$GLB,, | Performed by: PHYSICIAN ASSISTANT

## 2024-10-17 PROCEDURE — 3008F BODY MASS INDEX DOCD: CPT | Mod: CPTII,S$GLB,, | Performed by: PHYSICIAN ASSISTANT

## 2024-10-18 ENCOUNTER — PATIENT OUTREACH (OUTPATIENT)
Dept: ADMINISTRATIVE | Facility: CLINIC | Age: 69
End: 2024-10-18
Payer: MEDICARE

## 2024-10-18 ENCOUNTER — PATIENT MESSAGE (OUTPATIENT)
Dept: HEMATOLOGY/ONCOLOGY | Facility: CLINIC | Age: 69
End: 2024-10-18
Payer: MEDICARE

## 2024-10-18 ENCOUNTER — DOCUMENTATION ONLY (OUTPATIENT)
Dept: HEMATOLOGY/ONCOLOGY | Facility: CLINIC | Age: 69
End: 2024-10-18
Payer: MEDICARE

## 2024-10-18 LAB
CMV DNA SPEC QL NAA+PROBE: NORMAL
CYTOMEGALOVIRUS PCR, QUANT: NOT DETECTED IU/ML
EPSTEIN-BARR VIRUS DNA: NORMAL
EPSTEIN-BARR VIRUS PCR, QUANT: NOT DETECTED IU/ML
TACROLIMUS BLD-MCNC: 9.5 NG/ML (ref 5–15)

## 2024-10-21 ENCOUNTER — TELEPHONE (OUTPATIENT)
Dept: HEMATOLOGY/ONCOLOGY | Facility: CLINIC | Age: 69
End: 2024-10-21

## 2024-10-21 ENCOUNTER — PATIENT MESSAGE (OUTPATIENT)
Dept: HEMATOLOGY/ONCOLOGY | Facility: CLINIC | Age: 69
End: 2024-10-21

## 2024-10-21 ENCOUNTER — INFUSION (OUTPATIENT)
Dept: INFUSION THERAPY | Facility: HOSPITAL | Age: 69
End: 2024-10-21
Payer: MEDICARE

## 2024-10-21 ENCOUNTER — OFFICE VISIT (OUTPATIENT)
Dept: HEMATOLOGY/ONCOLOGY | Facility: CLINIC | Age: 69
End: 2024-10-21
Payer: MEDICARE

## 2024-10-21 ENCOUNTER — TELEPHONE (OUTPATIENT)
Dept: ENDOSCOPY | Facility: HOSPITAL | Age: 69
End: 2024-10-21
Payer: MEDICARE

## 2024-10-21 ENCOUNTER — CLINICAL SUPPORT (OUTPATIENT)
Dept: HEMATOLOGY/ONCOLOGY | Facility: CLINIC | Age: 69
End: 2024-10-21
Payer: MEDICARE

## 2024-10-21 VITALS
SYSTOLIC BLOOD PRESSURE: 105 MMHG | BODY MASS INDEX: 23.33 KG/M2 | DIASTOLIC BLOOD PRESSURE: 55 MMHG | HEART RATE: 74 BPM | OXYGEN SATURATION: 99 % | RESPIRATION RATE: 18 BRPM | HEIGHT: 69 IN | WEIGHT: 157.5 LBS

## 2024-10-21 VITALS
RESPIRATION RATE: 18 BRPM | HEART RATE: 75 BPM | TEMPERATURE: 98 F | SYSTOLIC BLOOD PRESSURE: 129 MMHG | DIASTOLIC BLOOD PRESSURE: 62 MMHG

## 2024-10-21 DIAGNOSIS — D61.818 PANCYTOPENIA: ICD-10-CM

## 2024-10-21 DIAGNOSIS — T45.1X5A ANEMIA DUE TO ANTINEOPLASTIC CHEMOTHERAPY: ICD-10-CM

## 2024-10-21 DIAGNOSIS — T45.1X5A CHEMOTHERAPY-INDUCED NAUSEA: ICD-10-CM

## 2024-10-21 DIAGNOSIS — D84.822 IMMUNOCOMPROMISED STATE ASSOCIATED WITH STEM CELL TRANSPLANT: ICD-10-CM

## 2024-10-21 DIAGNOSIS — K52.1 CHEMOTHERAPY INDUCED DIARRHEA: ICD-10-CM

## 2024-10-21 DIAGNOSIS — E83.42 HYPOMAGNESEMIA: ICD-10-CM

## 2024-10-21 DIAGNOSIS — D64.81 ANEMIA DUE TO ANTINEOPLASTIC CHEMOTHERAPY: ICD-10-CM

## 2024-10-21 DIAGNOSIS — Z94.81 HISTORY OF ALLOGENEIC BONE MARROW TRANSPLANT: Primary | ICD-10-CM

## 2024-10-21 DIAGNOSIS — Z76.82 STEM CELL TRANSPLANT CANDIDATE: ICD-10-CM

## 2024-10-21 DIAGNOSIS — T45.1X5A CHEMOTHERAPY INDUCED DIARRHEA: ICD-10-CM

## 2024-10-21 DIAGNOSIS — Z94.84 IMMUNOCOMPROMISED STATE ASSOCIATED WITH STEM CELL TRANSPLANT: ICD-10-CM

## 2024-10-21 DIAGNOSIS — R11.0 CHEMOTHERAPY-INDUCED NAUSEA: ICD-10-CM

## 2024-10-21 DIAGNOSIS — D46.9 MYELODYSPLASTIC SYNDROME: Primary | ICD-10-CM

## 2024-10-21 DIAGNOSIS — Z94.81 HISTORY OF ALLOGENEIC BONE MARROW TRANSPLANT: ICD-10-CM

## 2024-10-21 LAB
ABO + RH BLD: NORMAL
ALBUMIN SERPL BCP-MCNC: 3.1 G/DL (ref 3.5–5.2)
ALP SERPL-CCNC: 65 U/L (ref 40–150)
ALT SERPL W/O P-5'-P-CCNC: 37 U/L (ref 10–44)
ANION GAP SERPL CALC-SCNC: 10 MMOL/L (ref 8–16)
ANISOCYTOSIS BLD QL SMEAR: SLIGHT
AST SERPL-CCNC: 30 U/L (ref 10–40)
BASOPHILS # BLD AUTO: ABNORMAL K/UL (ref 0–0.2)
BASOPHILS NFR BLD: 0 % (ref 0–1.9)
BILIRUB SERPL-MCNC: 0.5 MG/DL (ref 0.1–1)
BLD GP AB SCN CELLS X3 SERPL QL: NORMAL
BLD PROD TYP BPU: NORMAL
BLOOD UNIT EXPIRATION DATE: NORMAL
BLOOD UNIT TYPE CODE: 7300
BLOOD UNIT TYPE: NORMAL
BUN SERPL-MCNC: 11 MG/DL (ref 8–23)
CALCIUM SERPL-MCNC: 8.7 MG/DL (ref 8.7–10.5)
CHLORIDE SERPL-SCNC: 104 MMOL/L (ref 95–110)
CO2 SERPL-SCNC: 27 MMOL/L (ref 23–29)
CODING SYSTEM: NORMAL
CREAT SERPL-MCNC: 0.8 MG/DL (ref 0.5–1.4)
CROSSMATCH INTERPRETATION: NORMAL
DIFFERENTIAL METHOD BLD: ABNORMAL
DISPENSE STATUS: NORMAL
EOSINOPHIL # BLD AUTO: ABNORMAL K/UL (ref 0–0.5)
EOSINOPHIL NFR BLD: 0 % (ref 0–8)
ERYTHROCYTE [DISTWIDTH] IN BLOOD BY AUTOMATED COUNT: 13.3 % (ref 11.5–14.5)
EST. GFR  (NO RACE VARIABLE): >60 ML/MIN/1.73 M^2
GLUCOSE SERPL-MCNC: 128 MG/DL (ref 70–110)
HCT VFR BLD AUTO: 22.1 % (ref 40–54)
HGB BLD-MCNC: 7.6 G/DL (ref 14–18)
HYPOCHROMIA BLD QL SMEAR: ABNORMAL
IMM GRANULOCYTES # BLD AUTO: ABNORMAL K/UL (ref 0–0.04)
IMM GRANULOCYTES NFR BLD AUTO: ABNORMAL % (ref 0–0.5)
LYMPHOCYTES # BLD AUTO: ABNORMAL K/UL (ref 1–4.8)
LYMPHOCYTES NFR BLD: 6 % (ref 18–48)
MAGNESIUM SERPL-MCNC: 1.4 MG/DL (ref 1.6–2.6)
MCH RBC QN AUTO: 30.3 PG (ref 27–31)
MCHC RBC AUTO-ENTMCNC: 34.4 G/DL (ref 32–36)
MCV RBC AUTO: 88 FL (ref 82–98)
MONOCYTES # BLD AUTO: ABNORMAL K/UL (ref 0.3–1)
MONOCYTES NFR BLD: 24 % (ref 4–15)
NEUTROPHILS # BLD AUTO: ABNORMAL K/UL (ref 1.8–7.7)
NEUTROPHILS NFR BLD: 69 % (ref 38–73)
NEUTS BAND NFR BLD MANUAL: 1 %
NRBC BLD-RTO: 0 /100 WBC
NUM UNITS TRANS PACKED RBC: NORMAL
OVALOCYTES BLD QL SMEAR: ABNORMAL
PHOSPHATE SERPL-MCNC: 3.2 MG/DL (ref 2.7–4.5)
PLATELET # BLD AUTO: 31 K/UL (ref 150–450)
PMV BLD AUTO: 11.9 FL (ref 9.2–12.9)
POIKILOCYTOSIS BLD QL SMEAR: SLIGHT
POLYCHROMASIA BLD QL SMEAR: ABNORMAL
POTASSIUM SERPL-SCNC: 3.9 MMOL/L (ref 3.5–5.1)
PROT SERPL-MCNC: 5.5 G/DL (ref 6–8.4)
RBC # BLD AUTO: 2.51 M/UL (ref 4.6–6.2)
SODIUM SERPL-SCNC: 141 MMOL/L (ref 136–145)
SPECIMEN OUTDATE: NORMAL
SPHEROCYTES BLD QL SMEAR: ABNORMAL
TACROLIMUS BLD-MCNC: 10.7 NG/ML (ref 5–15)
WBC # BLD AUTO: 1.46 K/UL (ref 3.9–12.7)

## 2024-10-21 PROCEDURE — A4216 STERILE WATER/SALINE, 10 ML: HCPCS | Performed by: INTERNAL MEDICINE

## 2024-10-21 PROCEDURE — 36592 COLLECT BLOOD FROM PICC: CPT

## 2024-10-21 PROCEDURE — 85007 BL SMEAR W/DIFF WBC COUNT: CPT | Performed by: INTERNAL MEDICINE

## 2024-10-21 PROCEDURE — P9040 RBC LEUKOREDUCED IRRADIATED: HCPCS | Performed by: PHYSICIAN ASSISTANT

## 2024-10-21 PROCEDURE — 84100 ASSAY OF PHOSPHORUS: CPT | Performed by: INTERNAL MEDICINE

## 2024-10-21 PROCEDURE — 86920 COMPATIBILITY TEST SPIN: CPT | Performed by: PHYSICIAN ASSISTANT

## 2024-10-21 PROCEDURE — 80197 ASSAY OF TACROLIMUS: CPT | Performed by: INTERNAL MEDICINE

## 2024-10-21 PROCEDURE — 36430 TRANSFUSION BLD/BLD COMPNT: CPT

## 2024-10-21 PROCEDURE — 86850 RBC ANTIBODY SCREEN: CPT | Performed by: INTERNAL MEDICINE

## 2024-10-21 PROCEDURE — 25000003 PHARM REV CODE 250: Performed by: PHYSICIAN ASSISTANT

## 2024-10-21 PROCEDURE — 99999 PR PBB SHADOW E&M-EST. PATIENT-LVL V: CPT | Mod: PBBFAC,,, | Performed by: PHYSICIAN ASSISTANT

## 2024-10-21 PROCEDURE — 87799 DETECT AGENT NOS DNA QUANT: CPT | Performed by: INTERNAL MEDICINE

## 2024-10-21 PROCEDURE — 36415 COLL VENOUS BLD VENIPUNCTURE: CPT | Performed by: INTERNAL MEDICINE

## 2024-10-21 PROCEDURE — 63600175 PHARM REV CODE 636 W HCPCS: Performed by: INTERNAL MEDICINE

## 2024-10-21 PROCEDURE — 25000003 PHARM REV CODE 250: Performed by: INTERNAL MEDICINE

## 2024-10-21 PROCEDURE — 83735 ASSAY OF MAGNESIUM: CPT | Performed by: INTERNAL MEDICINE

## 2024-10-21 PROCEDURE — 86900 BLOOD TYPING SEROLOGIC ABO: CPT | Performed by: INTERNAL MEDICINE

## 2024-10-21 PROCEDURE — 85027 COMPLETE CBC AUTOMATED: CPT | Performed by: INTERNAL MEDICINE

## 2024-10-21 PROCEDURE — 99999 PR PBB SHADOW E&M-EST. PATIENT-LVL II: CPT | Mod: PBBFAC,,,

## 2024-10-21 PROCEDURE — 80053 COMPREHEN METABOLIC PANEL: CPT | Performed by: INTERNAL MEDICINE

## 2024-10-21 PROCEDURE — 86901 BLOOD TYPING SEROLOGIC RH(D): CPT | Performed by: INTERNAL MEDICINE

## 2024-10-21 RX ORDER — HEPARIN 100 UNIT/ML
500 SYRINGE INTRAVENOUS
Status: CANCELLED | OUTPATIENT
Start: 2024-10-21

## 2024-10-21 RX ORDER — HYDROCODONE BITARTRATE AND ACETAMINOPHEN 500; 5 MG/1; MG/1
TABLET ORAL ONCE
Status: COMPLETED | OUTPATIENT
Start: 2024-10-21 | End: 2024-10-21

## 2024-10-21 RX ORDER — SODIUM CHLORIDE 0.9 % (FLUSH) 0.9 %
10 SYRINGE (ML) INJECTION
Status: DISCONTINUED | OUTPATIENT
Start: 2024-10-21 | End: 2024-10-21 | Stop reason: HOSPADM

## 2024-10-21 RX ORDER — SODIUM CHLORIDE 0.9 % (FLUSH) 0.9 %
10 SYRINGE (ML) INJECTION
Status: CANCELLED | OUTPATIENT
Start: 2024-10-21

## 2024-10-21 RX ORDER — HEPARIN 100 UNIT/ML
500 SYRINGE INTRAVENOUS
Status: DISCONTINUED | OUTPATIENT
Start: 2024-10-21 | End: 2024-10-21 | Stop reason: HOSPADM

## 2024-10-21 RX ORDER — LANOLIN ALCOHOL/MO/W.PET/CERES
800 CREAM (GRAM) TOPICAL 2 TIMES DAILY
Start: 2024-10-21

## 2024-10-21 RX ORDER — PROCHLORPERAZINE MALEATE 5 MG
5 TABLET ORAL 4 TIMES DAILY PRN
Start: 2024-10-21

## 2024-10-21 RX ORDER — LOPERAMIDE HYDROCHLORIDE 2 MG/1
2 CAPSULE ORAL 2 TIMES DAILY PRN
Start: 2024-10-21

## 2024-10-21 RX ORDER — HYDROCODONE BITARTRATE AND ACETAMINOPHEN 500; 5 MG/1; MG/1
TABLET ORAL ONCE
Status: CANCELLED | OUTPATIENT
Start: 2024-10-21 | End: 2024-10-21

## 2024-10-21 RX ADMIN — Medication 10 ML: at 09:10

## 2024-10-21 RX ADMIN — HEPARIN 500 UNITS: 100 SYRINGE at 09:10

## 2024-10-21 RX ADMIN — Medication 10 ML: at 04:10

## 2024-10-21 RX ADMIN — HEPARIN 500 UNITS: 100 SYRINGE at 04:10

## 2024-10-21 RX ADMIN — SODIUM CHLORIDE: 9 INJECTION, SOLUTION INTRAVENOUS at 02:10

## 2024-10-21 NOTE — TELEPHONE ENCOUNTER
Left voicemail for patient's daughter to advise on patient's appointment for PRBCs this afternoon.

## 2024-10-21 NOTE — TELEPHONE ENCOUNTER
Spoke to patient for pre-call to confirm scheduled Bone Marrow Biopsy and patient verbalized understanding of the following:       Date of Procedure (s)  verified 10/23/24  Arrival Time 12:10 PM verified.  Location of Procedure(s) [place] 4th Floor verified.  NPO status reinforced. Ok to continue clear liquids up until 2 hours prior to the Endoscopy procedure.     Pt confirmed receipt of prep instructions and Rx prep (if applicable).  Instructions provided per Hem/Onc to patient via [facility]  Pt confirmed ride home after procedure if procedure requires anesthesia.   Pre-call screening questionnaire reviewed and completed with patient.   Appointment details are tentative, including check-in time.  If the patient begins taking any blood thinning medications, injectable weight loss/diabetes medications (other than insulin), or Adipex (phentermine) patient was instructed to contact the endoscopy scheduling department as soon as possible.  Patient was advised to call the endoscopy scheduling department if any questions or concerns arise.       [facility] Scheduling Department

## 2024-10-21 NOTE — PLAN OF CARE
1641-Patient tolerated 1 unit PRBCs well. Discharged without complaints or S/S of adverse event.   Instructed to call provider for any questions or concerns. Verbalized understanding.

## 2024-10-21 NOTE — PLAN OF CARE
1420-Labs , hx, and medications reviewed, patient is here for 1 unit of blood. Assessment completed. Discussed plan of care with patient. Patient in agreement. Chair reclined and warm blanket and snack offered.

## 2024-10-21 NOTE — PROGRESS NOTES
BMT Pharmacist Medication Review Note     All current medications were reviewed with the patient and wife. The patient brought in all of his medications with him to this visit.      We discussed the changes made since last meeting:   - Stop taking the following medications as scheduled: olanzapine, prochlorperazine, and loperamide   - Stop taking oral vancomycin as patient has remained Cdiff negative and is no longer having diarrhea   - Ursodiol has completed   - MMF completes after Thursday afternoon    Patient doing okay in terms of nausea and diarrhea        Medicamentos/Medications Indicación/Indication Mañana/Morning Tarde/Afternoon Noche/Night   Acyclovir 800mg  Prevención de infecciones virales  Viral infection prevention 1 tableta  1 tableta   Letermovir (Prevymis®) 480mg Prevención de infección por CMV  CMV infection prevention 1 tableta     Posaconazole 100mg Prevencón de infecciones fungales  Fungal infection prevention 3 tabletas     Sulfamethoxazole-trimethoprim (Bactrim®) 800-160mg Fungal pneumonia prevention (Comience a ansley el 10/21/24) 1 tableta los lunes, miércoles y viernes (Mon., Wed., Fri)      Mycophenolate 250mg Prevención de la enfermedad de injerto contra huésped (GVHD por lopez siglas en inglés)  (Hasta 10/24/24) 4 cápsulas 4 cápsulas 4 c?ápsulas   **Tacrolimus 0.5mg Prevención de GVHD - NO tome la dosis de por la mañana en días que se relizará laboratorios  1 cápsula  2 cápsulas   Magnesium oxide 400mg Suplemento de magnesio 2 tabletas  2 tabletas      Carvedilol (Coreg®) 6.25 mg Presión arterial maeve  1 tableta  1 tableta   Gabapentin (Neurontin®) 600 mg Neuropatía 2 cápsulas  2 cápsulas   Ondansetron (Zofran®) 8 mg Náuseas/Vómito 1 tableta 1 tableta 1 tableta   Lidocaine patch (Lidoderm®) 5% Dolor en los hombros 1 parcho en el hombro milagros y  1 parcho en el hombro derecho  Remueva luego de las 12 horas     **medicamento nuevo o cambios realizados al medicamento  MEDICAMENTOS CUANDO  VI NECESARIOS/AS NEEDED MEDICATIONS:                                                                  Loperamida (Imodium®) 2 mg as needed for diarrhea                                                                                                            Prochlorperazine (Compazine®) 5mg cada 6 horas as needed for Náuseas/Vómito                                                           Tramadol (Ultram®) 50 mg cada 6 horas cuando sea necesario para el dolor                                                     Trazodone ( Desyrel) 50 mg por boca cada noche cuando sea necesario para la insomnia    HOLD UNTIL TOLD TO RESTART:  Cilostazol        All questions were answered.      Sanjuana Poe, Pharm.D., BCOP  Clinical Pharmacy Specialist, Bone Marrow Transplant/Cellular Therapy  [facility]  SpectraLink: [phone]

## 2024-10-22 ENCOUNTER — TELEPHONE (OUTPATIENT)
Dept: HEMATOLOGY/ONCOLOGY | Facility: CLINIC | Age: 69
End: 2024-10-22
Payer: MEDICARE

## 2024-10-22 LAB
CMV DNA SPEC QL NAA+PROBE: NORMAL
CYTOMEGALOVIRUS PCR, QUANT: NOT DETECTED IU/ML
EPSTEIN-BARR VIRUS DNA: NORMAL
EPSTEIN-BARR VIRUS PCR, QUANT: NOT DETECTED IU/ML

## 2024-10-22 NOTE — PROGRESS NOTES
"//Section of Hematology and Stem Cell Transplantation    Post-Transplantation Follow Up Visit     10/21/24    Transplant History:   Primary oncologist: Paco Hickey MD  Primary oncologic diagnosis: MDS  Transplant date: 9/19/2024  Donor: haploidentical  Blood Type (Patient): B +  Blood Type (Donor): A +  CMV (Patient): Positive  CMV (Donor): Positive  Graft source: Bone marrow  CD34+ cell dose: 3.55x10^6  Conditioning Regimen: Fludarabine plus melphalan 100 mg/m2 + 2Gy TBI  GVHD prophylaxis: Post-transplant cyclophosphamide, Tacrolimus, MMF  Immediate post-transplant complications: Patient experienced expected GI toxicities with C diff negative diarrhea and nausea, neutropenic fever with negative infectious work up, expected cytopenias requiring transfusions, electrolyte abnormalities requiring replacement, volume overload requiring diuresis, intermittent SOB from pulmonary edema requiring 02, and a hemorrhoid flare up. Diarrhea and SOB improved towards the end of the hospital stay.     History of Present Ilness:   Guillaume Salinas (Guillaume) is a pleasant 69 y.o.male with a past medical history of MDS who is status post haploidentical stem cell transplantation conditioned with FluMel 100 + PTCy+ 2Gy TBI who is currently 33 who presents for post-transplant follow up. He was discharged from the hospital yesterday, October 16th (day 27).     Presents with his wife (daughter over phone). He declined  services, opted for his wife/daughter to help. Generally doing okay, fatigued and nauseous since discharge. Minimal appetite. No fevers/infectious concerns.    Daughter is concerned with restlessness and anxiety of the patient and a sensation that he can't "relax" or "be still". We reviewed medications and have discontinued several scheduled medications which could be contributing (d/c zyprexa, compazine)    PAST MEDICAL HISTORY:   Past Medical History:   Diagnosis Date    Anticoagulant long-term use  "    Coronary artery disease     Hypertension     Myelodysplastic syndrome     Peripheral vascular disease, unspecified        PAST SURGICAL HISTORY:   Past Surgical History:   Procedure Laterality Date    BONE MARROW BIOPSY Left 2023    Procedure: Biopsy-bone marrow;  Surgeon: Harry Diamond MD;  Location: [facility] OR;  Service: Oncology;  Laterality: Left;    COLONOSCOPY N/A 2022    Procedure: COLONOSCOPY Golytely Vaccinated will bring cards;  Surgeon: Dereje Simon MD;  Location: [facility] ENDO;  Service: Endoscopy;  Laterality: N/A;  Do not cancel this order    INSERTION OF LEMONS CATHETER Right 2024    Procedure: INSERTION, CATHETER, CENTRAL VENOUS, LEMONS TRIPLE LUMEN;  Surgeon: Kg Patten MD;  Location: [address];  Service: General;  Laterality: Right;       PAST SOCIAL HISTORY:  Social History     Tobacco Use    Smoking status: Former     Current packs/day: 0.00     Average packs/day: 0.3 packs/day for 50.0 years (12.5 ttl pk-yrs)     Types: Cigarettes     Start date: 3/1/1973     Quit date: 3/1/2023     Years since quittin.6     Passive exposure: Past    Smokeless tobacco: Never   Substance Use Topics    Alcohol use: Not Currently    Drug use: Never       FAMILY HISTORY:  Family History   Problem Relation Name Age of Onset    Hypertension Mother      Cancer Father      Cancer Brother 9     No Known Problems Daughter      No Known Problems Daughter      No Known Problems Son         CURRENT MEDICATIONS:   Current Outpatient Medications   Medication Sig    acyclovir (ZOVIRAX) 800 MG Tab Take 1 tablet (800 mg total) by mouth 2 (two) times daily.    carvediloL (COREG) 6.25 MG tablet Take 1 tablet (6.25 mg total) by mouth 2 (two) times daily.    gabapentin (NEURONTIN) 300 MG capsule Take 2 capsules (600 mg total) by mouth 2 (two) times daily.    letermovir (PREVYMIS) 480 mg Tab Take 1 tablet (480 mg total) by mouth Daily.    LIDOcaine (LIDODERM) 5 % Place 1 patch onto the  skin once daily. Remove & Discard patch within 12 hours or as directed by MD. Place patch to left shoulder.    magnesium oxide (MAG-OX) 400 mg (241.3 mg magnesium) tablet Take 2 tablets (800 mg total) by mouth 2 (two) times daily.    mycophenolate (CELLCEPT) 250 mg Cap Take 4 capsules (1,000 mg total) by mouth 3 (three) times daily. STOP 10-24-24    ondansetron (ZOFRAN-ODT) 8 MG TbDL DISSOLVE 1 tablet (8 mg total) by mouth every 8 (eight) hours.    pantoprazole (PROTONIX) 40 MG tablet Take 1 tablet (40 mg total) by mouth once daily.    posaconazole (NOXAFIL) 100 mg TbEC tablet Take 3 tablets (300 mg total) by mouth once daily.    sulfamethoxazole-trimethoprim 800-160mg (BACTRIM DS) 800-160 mg Tab Take 1 tablet by mouth every Mon, Wed, Fri. START 10/21/24    tacrolimus (PROGRAF) 0.5 MG Cap Take 1 capsule (0.5 mg total) by mouth every morning AND 2 capsules (1 mg total) every evening.    traMADoL (ULTRAM) 50 mg tablet Take 1 tablet (50 mg total) by mouth every 6 (six) hours as needed for Pain.    traZODone (DESYREL) 50 MG tablet Take 1 tablet (50 mg total) by mouth nightly as needed for Insomnia.    loperamide (IMODIUM) 2 mg capsule Take 1 capsule (2 mg total) by mouth 2 (two) times daily as needed for Diarrhea.    prochlorperazine (COMPAZINE) 5 MG tablet Take 1 tablet (5 mg total) by mouth 4 (four) times daily as needed for Nausea.     No current facility-administered medications for this visit.       ALLERGIES:   Review of patient's allergies indicates:  No Known Allergies    GVHD Review of Systems:     Pertinent positives and negatives included in the HPI. Otherwise a 14 point review of systems is negative. GVHD review of systems recorded in BMT flowsheet.     Physical Exam:     Vitals:    10/21/24 1005   BP: (!) 105/55   Pulse:    Resp:      General: Appears well, NAD  HEENT: MMM, no OP lesions  Pulmonary: CTAB, no increased work of breathing, no W/R/C  Cardiovascular: S1S2 normal, RRR, no M/R/G  Abdominal: Soft,  NT, ND, BS+, no HSM  Extremities: No C/C/E  Neurological: AAOx4, grossly normal, no focal deficits  Dermatologic: No appreciable rashes or lesions    ECOG Performance Status: (foot note - ECOG PS provided by Eastern Cooperative Oncology Group) 1 - Symptomatic but completely ambulatory    Karnofsky Performance Score:  70%- Cares for Self: Unable to Carry on Normal Activity or Active Work    Labs:   Lab Results   Component Value Date    WBC 1.46 (LL) 10/21/2024    RBC 2.51 (L) 10/21/2024    HGB 7.6 (L) 10/21/2024    HCT 22.1 (L) 10/21/2024    MCV 88 10/21/2024    MCH 30.3 10/21/2024    MCHC 34.4 10/21/2024    RDW 13.3 10/21/2024    PLT 31 (LL) 10/21/2024    MPV 11.9 10/21/2024    GRAN Test Not Performed 10/21/2024    GRAN 69.0 10/21/2024    LYMPH Test Not Performed 10/21/2024    LYMPH 6.0 (L) 10/21/2024    MONO Test Not Performed 10/21/2024    MONO 24.0 (H) 10/21/2024    EOS Test Not Performed 10/21/2024    BASO Test Not Performed 10/21/2024    EOSINOPHIL 0.0 10/21/2024    BASOPHIL 0.0 10/21/2024       Sodium   Date Value Ref Range Status   10/21/2024 141 136 - 145 mmol/L Final     Potassium   Date Value Ref Range Status   10/21/2024 3.9 3.5 - 5.1 mmol/L Final     Chloride   Date Value Ref Range Status   10/21/2024 104 95 - 110 mmol/L Final     CO2   Date Value Ref Range Status   10/21/2024 27 23 - 29 mmol/L Final     Glucose   Date Value Ref Range Status   10/21/2024 128 (H) 70 - 110 mg/dL Final     BUN   Date Value Ref Range Status   10/21/2024 11 8 - 23 mg/dL Final     Creatinine   Date Value Ref Range Status   10/21/2024 0.8 0.5 - 1.4 mg/dL Final     Calcium   Date Value Ref Range Status   10/21/2024 8.7 8.7 - 10.5 mg/dL Final     Total Protein   Date Value Ref Range Status   10/21/2024 5.5 (L) 6.0 - 8.4 g/dL Final     Albumin   Date Value Ref Range Status   10/21/2024 3.1 (L) 3.5 - 5.2 g/dL Final     Total Bilirubin   Date Value Ref Range Status   10/21/2024 0.5 0.1 - 1.0 mg/dL Final     Comment:     For infants  and newborns, interpretation of results should be based  on gestational age, weight and in agreement with clinical  observations.    Premature Infant recommended reference ranges:  Up to 24 hours.............<8.0 mg/dL  Up to 48 hours............<12.0 mg/dL  3-5 days..................<15.0 mg/dL  6-29 days.................<15.0 mg/dL       Alkaline Phosphatase   Date Value Ref Range Status   10/21/2024 65 40 - 150 U/L Final     AST   Date Value Ref Range Status   10/21/2024 30 10 - 40 U/L Final     ALT   Date Value Ref Range Status   10/21/2024 37 10 - 44 U/L Final     Anion Gap   Date Value Ref Range Status   10/21/2024 10 8 - 16 mmol/L Final     eGFR if    Date Value Ref Range Status   08/27/2021 >60 >60 mL/min/1.73 m^2 Final   08/27/2021 >60 >60 mL/min/1.73 m^2 Final   08/27/2021 >60 >60 mL/min/1.73 m^2 Final     eGFR if non    Date Value Ref Range Status   08/27/2021 57 (A) >60 mL/min/1.73 m^2 Final     Comment:     Calculation used to obtain the estimated glomerular filtration  rate (eGFR) is the CKD-EPI equation.      08/27/2021 57 (A) >60 mL/min/1.73 m^2 Final     Comment:     Calculation used to obtain the estimated glomerular filtration  rate (eGFR) is the CKD-EPI equation.      08/27/2021 57 (A) >60 mL/min/1.73 m^2 Final     Comment:     Calculation used to obtain the estimated glomerular filtration  rate (eGFR) is the CKD-EPI equation.          Imaging:   Hospital imaging reviewed.    Pathology:  Prior pathology reviewed. Plan for day +30 bone marrow biopsy on 10/23/24    Acute GVHD Scoring:  GVHD Acute Assessment     10/22/2024 No acute GVHD.     Assessment and Plan:   Guillaume Salinas (Guillaume) is a pleasant 69 y.o.male with a past medical history of MDS s/p haplo SCT who presents for post-transplant follow up.    MDS: Status post treatment with azacitidine 75 mg/m2 daily x7 days plus venetoclax 100mg (voriconazole) daily x14 of 28 days.Venetoclax decreased to 7  days with cycle 3 until completion of 11 cycles. Primary oncologist is Paco Hickey MD who will be managing primary underlying disease.     Status post allogeneic stem cell transplantation: As noted above, status post haploidentical stem cell transplantation conditioned with FluMel 100 + PTCy+ 2Gy TBI . Currently 33. Engrafted on 10/09/24 day +20.  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   Current tacro dose: 0.5 mg every morning, and 1 mg nightly   Last tacro level: 10.7  Adjustments: No changes    Immunosuppression: Prevention with posaconazole, acyclovir. CMV prophylaxis with letermovir. PJP prophyalxis Bactrim MWF. Continue weekly monitoring of CMV and EBV.  Last CMV: Not-detected 10/14, last EBV: Not-detected 10/14.  Active infections: N/A    Pancytopenia: Due to underlying disease and chemotherapy. Transfuse for Hgb <7 g/dL and platelets <10k.  Filgrastim 300mcg/0.5 subcutaneously daily x 5 days.  1u RBC today for symptomatic anemia (Hgb 7.6)    Hypomagnesemia: Related to tacro, poor po intake. Increase MagOx to 800mg twice daily.      Chemotherapy Induced Nausea: Improving. Will work to minimize pill burden - Discontinue olanzazpine 5mg qhs. Discontinue scheduled compazine and adjust to PRN. Continue ondansetron 8mg q8hrs, will taper down if nausea remains improved.     Anxiety, Restlessness: Noted since discharge by family. Discontinuing scheduled zyprexa/compazine as this may be medication induced. Monitor for improvement end of week.    Follow up: Twice weekly follow up with labs.    Medical Complexity:     Visit today included increased complexity associated with the care of the episodic problems addressed above and managing the longitudinal care of the patient due to the serious and/or complex managed problem(s) history of Allo SCT.      Orders Placed:      Orders Placed This Encounter     Prepare RBC 1 Unit     Orders Placed This Encounter   Procedures    Prepare RBC 1 Unit     Standing Status:   Future     Number of Occurrences:   1     Standing Expiration Date:   11/21/2025     Order Specific Question:   Number of Units     Answer:   1 Unit     Order Specific Question:   Purpose of Preparation:     Answer:   Pending Transfusion     Order Specific Question:   Special Requirements     Answer:   Leukoreduced     Order Specific Question:   Special Requirements     Answer:   Irradiated     Order Specific Question:   Special Requirements     Answer:   CMV Negative       Follow Up:      Twice weekly follow up as scheduled.       Vanessa Andino, PA-C  Hematology, Oncology, and Stem Cell Transplantation  [facility]

## 2024-10-22 NOTE — TELEPHONE ENCOUNTER
----- Message from Dietitian Rony sent at 10/22/2024 12:28 PM CDT -----  Post SCT - needs nutrition f/u. Citizen of the [place] speaking patient.                                                                                                                                         10/21/2024                                                                                                         Oncology Nutrition Assessment                                                                                                                                                                                                              Tube Feeding or TPN                                                                                                   No                                                                                                                                                                                                                                                           No                                                                                                                      Seen by RD                                                                                                                                                                                                                                             During the past two weeks, my weight has:                                                                 Decreased (1)                                                                                                                                                                                                                                         Decreased (1)                                                                                                    Box 1 Score:                                                                                                                 1                                                                                                                                                                                                                                                               1                                                                                                                         Compared to what you normally eat, is your food intake this month:                           Less than usual (1)                                                                                                                                                                                                                                 Less than usual (1)                                                                                            Box 2 Score:                                                                                                                 1                                                                                                                                                                                                                                                              1                                                                                                                         Over the past two weeks I have had the following that have kept me from eating:     no appetite, just did not feeling like eating (3)                                                                                                                                                                                        no appetite, just did not feeling like eating (3)                                                   Box 3 Score:                                                                                                                 3                                                                                                                                                                                                                                                               3                                                                                                                         Activities and Functions over the past month:                                                              not feeling up to most things, but in bed or chair less than half the day (2)                                                                                                                                           not feeling up to most things, but in bed or chair less than half the day (2)      Box 4 Score:                                                                                                                 2                                                                                                                                                                                                                                                              2                                                                                                                         Total Nutrition Score                                                                                                     7                                                                                                                                                                                                                                                              7

## 2024-10-23 ENCOUNTER — OFFICE VISIT (OUTPATIENT)
Dept: PALLIATIVE MEDICINE | Facility: CLINIC | Age: 69
End: 2024-10-23
Payer: MEDICARE

## 2024-10-23 VITALS
WEIGHT: 154.75 LBS | BODY MASS INDEX: 22.85 KG/M2 | DIASTOLIC BLOOD PRESSURE: 79 MMHG | SYSTOLIC BLOOD PRESSURE: 106 MMHG | HEART RATE: 78 BPM | OXYGEN SATURATION: 97 %

## 2024-10-23 DIAGNOSIS — Z51.5 PALLIATIVE CARE BY SPECIALIST: Primary | ICD-10-CM

## 2024-10-23 DIAGNOSIS — Z76.82 STEM CELL TRANSPLANT CANDIDATE: ICD-10-CM

## 2024-10-23 DIAGNOSIS — D46.9 MYELODYSPLASTIC SYNDROME: ICD-10-CM

## 2024-10-23 DIAGNOSIS — K20.90 ESOPHAGITIS: ICD-10-CM

## 2024-10-23 PROCEDURE — 99999 PR PBB SHADOW E&M-EST. PATIENT-LVL III: CPT | Mod: PBBFAC,,, | Performed by: INTERNAL MEDICINE

## 2024-10-23 RX ORDER — PANTOPRAZOLE SODIUM 40 MG/1
40 TABLET, DELAYED RELEASE ORAL DAILY
Qty: 30 TABLET | Refills: 3 | Status: SHIPPED | OUTPATIENT
Start: 2024-10-23 | End: 2025-02-20

## 2024-10-23 NOTE — PROGRESS NOTES
Palliative Medicine Clinic Note        Consult Requested By: Ruperto      Reason for Visit: Follow up of symptom management    Patient was offered a  by this provider for the visit; he declined and requested that his wife serve as his         ASSESSMENT/PLAN:      Plan/Recommendations:    Diagnoses and all orders for this visit:    Palliative care by specialist  Patient is hopeful that he will feel better soon.  Some anxiety but not interested in other meds at this time.  Will keep close contact with patient/wife to understand his additional needs.  BACK PAIN-PT coming to home tomorrow for eval. Encouraged to take tramadol prior to PT visit so he can be comfortable during therapy.  Esophagitis  -     pantoprazole (PROTONIX) 40 MG tablet; Take 1 tablet (40 mg total) by mouth once daily.  Continues on antifungal for possible fungal esophagitis.  Myelodysplastic syndrome  Had stem cell transplant and has BM Bx today.  Stem cell transplant candidate   Completed on 9/19/2024.  Close follow up.  Next visit 12/11/20204.        Advance Care Planning   Advance Directives:   Living Will: No    [place]: No    Do Not Resuscitate Status: No    Medical Power of : No        Oral Declaration: No    Agent's Name:  Saumya Thomas (wife)   Agent's Contact Number:  [phone]    Decision Making:  Patient answered questions  Goals of Care: What is most important right now is to focus on avoiding the hospital, remaining as independent as possible, symptom/pain control, curative/life-prolongation (regardless of treatment burdens). Accordingly, we have decided that the best plan to meet the patient's goals includes continuing with treatment.         Follow up: 8 weeks (12/11/2024)     Plan discussed with: Patient/wife/brother in law    SUBJECTIVE:      History of Present Illness / Interval History:  Guillaume Salinas is 69 y.o. male with recent stem cell transplant for myelodysplastic syndrome.   Presents to Palliative Care Clinic for physical symptoms. Please see 08/12/2024 note for more details on his illness.  Patient underwent stem cell transplant by Dr. Hickey on 09/19/2024.  He has been followed expectantly and has been doing okay.  His biggest complaint today is mid epigastric discomfort especially if he eats with early satiety.  He is currently on antifungals but after reviewing his record his PPI has been discontinued.  He is really interested in decreasing the number of pills that he takes.  It appears that this was inadvertently stopped.  We will restart that.  He says he is doing okay he also has low back pain.  He is going to be seen at home by Physical therapy tomorrow and he is looking forward to that.  He has been limiting himself to the amount of tramadol that he takes in fact taking none yesterday.  I actually encouraged him to take a dose of tramadol about 30 minutes before he anticipates the physical therapist arriving to see if he can get the most out of his physical therapy visit.  He reports that his back pain stems from when he did some mopping and heavy lifting and is now getting worse.  It is difficult to understand whether    10/23/24  History obtained from: patient/wife and brother in law.    ROS:  Review of Systems   Constitutional:  Positive for activity change, appetite change and fatigue.   Gastrointestinal:  Positive for abdominal pain.   Psychiatric/Behavioral:  Positive for depressed mood.        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  Family all in [place].   Lots of family support.  **Primary  to Follow**  Palliative Care  Consult: No    Spiritual:  F - [place] and Belief:  Latter-day  I - Importance:  Yes  C - Community:  Yes  A - Address in Care:  Yes        Medications:    Current Outpatient Medications:     acyclovir (ZOVIRAX) 800 MG Tab, Take 1 tablet (800 mg total) by mouth 2 (two) times daily., Disp: 60 tablet, Rfl: 11    carvediloL (COREG) 6.25 MG tablet, Take 1 tablet (6.25 mg total) by mouth 2 (two) times daily., Disp: 180 tablet, Rfl: 3    gabapentin (NEURONTIN) 300 MG capsule, Take 2 capsules (600 mg total) by mouth 2 (two) times daily., Disp: 120 capsule, Rfl: 5    letermovir (PREVYMIS) 480 mg Tab, Take 1 tablet (480 mg total) by mouth Daily., Disp: 28 tablet, Rfl: 9    LIDOcaine (LIDODERM) 5 %, Place 1 patch onto the skin once daily. Remove & Discard patch within 12 hours or as directed by MD. Place patch to left shoulder., Disp: 30 patch, Rfl: 0    loperamide (IMODIUM) 2 mg capsule, Take 1 capsule (2 mg total) by mouth 2 (two) times daily as needed for Diarrhea., Disp: , Rfl:     magnesium oxide (MAG-OX) 400 mg (241.3 mg magnesium) tablet, Take 2 tablets (800 mg total) by mouth 2 (two) times daily., Disp: , Rfl:     mycophenolate (CELLCEPT) 250 mg Cap, Take 4 capsules (1,000 mg total) by mouth 3 (three) times daily. STOP 10-24-24, Disp: 240 capsule, Rfl: 0    ondansetron (ZOFRAN-ODT) 8 MG TbDL, DISSOLVE 1 tablet (8 mg total) by mouth every 8 (eight) hours., Disp: 90 tablet, Rfl: 11    posaconazole (NOXAFIL) 100 mg TbEC tablet, Take 3 tablets (300 mg total) by mouth once daily., Disp: 90 tablet, Rfl: 11    prochlorperazine (COMPAZINE) 5 MG tablet, Take 1 tablet (5 mg total) by mouth 4 (four) times daily as needed for Nausea., Disp: , Rfl:     sulfamethoxazole-trimethoprim 800-160mg (BACTRIM DS) 800-160 mg  Tab, Take 1 tablet by mouth every Mon, Wed, Fri. START 10/21/24, Disp: 12 tablet, Rfl: 11    tacrolimus (PROGRAF) 0.5 MG Cap, Take 1 capsule (0.5 mg total) by mouth every morning AND 2 capsules (1 mg total) every evening., Disp: 90 capsule, Rfl: 11    traMADoL (ULTRAM) 50 mg tablet, Take 1 tablet (50 mg total) by mouth every 6 (six) hours as needed for Pain., Disp: 20 tablet, Rfl: 0    traZODone (DESYREL) 50 MG tablet, Take 1 tablet (50 mg total) by mouth nightly as needed for Insomnia., Disp: 30 tablet, Rfl: 11    pantoprazole (PROTONIX) 40 MG tablet, Take 1 tablet (40 mg total) by mouth once daily., Disp: 30 tablet, Rfl: 3  No current facility-administered medications for this visit.    External  database queried on 10/23/2024  by Madalyn BARTLETT :    Review of patient's allergies indicates:  No Known Allergies     OBJECTIVE:      Physical Exam:  Vitals: Pulse: 78 (10/23/24 1122)  BP: 106/79 (10/23/24 1122)  SpO2: 97 % (10/23/24 1122)    Physical Exam  Vitals reviewed.   Constitutional:       Appearance: He is ill-appearing.   Cardiovascular:      Rate and Rhythm: Normal rate.   Pulmonary:      Effort: Pulmonary effort is normal.   Abdominal:      Palpations: Abdomen is soft.   Neurological:      Mental Status: He is oriented to person, place, and time.   Psychiatric:      Comments: Anxious         Labs:Reviewed; BMBx today.    Imaging: not reviewed.     I spent a total of 60 minutes on the day of the visit. This includes face to face time in discussion of goals of care, symptom assessment, coordination of care and emotional support.  This also includes non-face to face time preparing to see the patient (eg, review of tests/imaging), obtaining and/or reviewing separately obtained history, documenting clinical information in the electronic or other health record, independently interpreting results and communicating results to the patient/family/caregiver, or care coordinator.       Madalyn Mathis MD

## 2024-10-24 ENCOUNTER — INFUSION (OUTPATIENT)
Dept: INFUSION THERAPY | Facility: HOSPITAL | Age: 69
End: 2024-10-24
Payer: MEDICARE

## 2024-10-24 ENCOUNTER — OFFICE VISIT (OUTPATIENT)
Dept: HEMATOLOGY/ONCOLOGY | Facility: CLINIC | Age: 69
End: 2024-10-24
Payer: MEDICARE

## 2024-10-24 ENCOUNTER — DOCUMENTATION ONLY (OUTPATIENT)
Dept: HEMATOLOGY/ONCOLOGY | Facility: CLINIC | Age: 69
End: 2024-10-24

## 2024-10-24 VITALS
RESPIRATION RATE: 18 BRPM | TEMPERATURE: 98 F | SYSTOLIC BLOOD PRESSURE: 119 MMHG | WEIGHT: 142 LBS | HEIGHT: 69 IN | DIASTOLIC BLOOD PRESSURE: 73 MMHG | HEART RATE: 84 BPM | BODY MASS INDEX: 21.03 KG/M2

## 2024-10-24 VITALS
HEIGHT: 69 IN | DIASTOLIC BLOOD PRESSURE: 73 MMHG | HEART RATE: 84 BPM | WEIGHT: 142 LBS | SYSTOLIC BLOOD PRESSURE: 119 MMHG | OXYGEN SATURATION: 96 % | RESPIRATION RATE: 18 BRPM | BODY MASS INDEX: 21.03 KG/M2

## 2024-10-24 DIAGNOSIS — T45.1X5A CHEMOTHERAPY-INDUCED NAUSEA: ICD-10-CM

## 2024-10-24 DIAGNOSIS — D46.9 MYELODYSPLASTIC SYNDROME: Primary | ICD-10-CM

## 2024-10-24 DIAGNOSIS — E83.42 HYPOMAGNESEMIA: ICD-10-CM

## 2024-10-24 DIAGNOSIS — D46.9 MYELODYSPLASTIC SYNDROME: ICD-10-CM

## 2024-10-24 DIAGNOSIS — Z94.84 IMMUNOCOMPROMISED STATE ASSOCIATED WITH STEM CELL TRANSPLANT: ICD-10-CM

## 2024-10-24 DIAGNOSIS — T45.1X5A CHEMOTHERAPY-INDUCED NEUTROPENIA: ICD-10-CM

## 2024-10-24 DIAGNOSIS — D70.1 CHEMOTHERAPY-INDUCED NEUTROPENIA: ICD-10-CM

## 2024-10-24 DIAGNOSIS — Z94.81 S/P ALLOGENEIC BONE MARROW TRANSPLANT: ICD-10-CM

## 2024-10-24 DIAGNOSIS — R11.0 CHEMOTHERAPY-INDUCED NAUSEA: ICD-10-CM

## 2024-10-24 DIAGNOSIS — D84.822 IMMUNOCOMPROMISED STATE ASSOCIATED WITH STEM CELL TRANSPLANT: ICD-10-CM

## 2024-10-24 DIAGNOSIS — Z76.82 STEM CELL TRANSPLANT CANDIDATE: ICD-10-CM

## 2024-10-24 DIAGNOSIS — Z94.81 HISTORY OF ALLOGENEIC BONE MARROW TRANSPLANT: Primary | ICD-10-CM

## 2024-10-24 DIAGNOSIS — D61.818 PANCYTOPENIA: ICD-10-CM

## 2024-10-24 LAB
ABO + RH BLD: NORMAL
ALBUMIN SERPL BCP-MCNC: 3.3 G/DL (ref 3.5–5.2)
ALP SERPL-CCNC: 72 U/L (ref 40–150)
ALT SERPL W/O P-5'-P-CCNC: 48 U/L (ref 10–44)
ANION GAP SERPL CALC-SCNC: 5 MMOL/L (ref 8–16)
ANISOCYTOSIS BLD QL SMEAR: SLIGHT
AST SERPL-CCNC: 42 U/L (ref 10–40)
BASOPHILS NFR BLD: 0 % (ref 0–1.9)
BILIRUB SERPL-MCNC: 0.5 MG/DL (ref 0.1–1)
BLD GP AB SCN CELLS X3 SERPL QL: NORMAL
BUN SERPL-MCNC: 11 MG/DL (ref 8–23)
CALCIUM SERPL-MCNC: 8.7 MG/DL (ref 8.7–10.5)
CHLORIDE SERPL-SCNC: 106 MMOL/L (ref 95–110)
CO2 SERPL-SCNC: 28 MMOL/L (ref 23–29)
CREAT SERPL-MCNC: 0.8 MG/DL (ref 0.5–1.4)
DIFFERENTIAL METHOD BLD: ABNORMAL
EOSINOPHIL NFR BLD: 1 % (ref 0–8)
ERYTHROCYTE [DISTWIDTH] IN BLOOD BY AUTOMATED COUNT: 14.8 % (ref 11.5–14.5)
EST. GFR  (NO RACE VARIABLE): >60 ML/MIN/1.73 M^2
GLUCOSE SERPL-MCNC: 120 MG/DL (ref 70–110)
HCT VFR BLD AUTO: 26.9 % (ref 40–54)
HGB BLD-MCNC: 9 G/DL (ref 14–18)
HYPOCHROMIA BLD QL SMEAR: ABNORMAL
IMM GRANULOCYTES # BLD AUTO: ABNORMAL K/UL (ref 0–0.04)
IMM GRANULOCYTES NFR BLD AUTO: ABNORMAL % (ref 0–0.5)
LYMPHOCYTES NFR BLD: 16 % (ref 18–48)
MAGNESIUM SERPL-MCNC: 1.5 MG/DL (ref 1.6–2.6)
MCH RBC QN AUTO: 30.8 PG (ref 27–31)
MCHC RBC AUTO-ENTMCNC: 33.5 G/DL (ref 32–36)
MCV RBC AUTO: 92 FL (ref 82–98)
METAMYELOCYTES NFR BLD MANUAL: 1 %
MONOCYTES NFR BLD: 21 % (ref 4–15)
NEUTROPHILS NFR BLD: 61 % (ref 38–73)
NRBC BLD-RTO: 0 /100 WBC
PHOSPHATE SERPL-MCNC: 3 MG/DL (ref 2.7–4.5)
PLATELET # BLD AUTO: 32 K/UL (ref 150–450)
PLATELET BLD QL SMEAR: ABNORMAL
PMV BLD AUTO: 13.5 FL (ref 9.2–12.9)
POIKILOCYTOSIS BLD QL SMEAR: SLIGHT
POLYCHROMASIA BLD QL SMEAR: ABNORMAL
POTASSIUM SERPL-SCNC: 4 MMOL/L (ref 3.5–5.1)
PROT SERPL-MCNC: 5.6 G/DL (ref 6–8.4)
RBC # BLD AUTO: 2.92 M/UL (ref 4.6–6.2)
SODIUM SERPL-SCNC: 139 MMOL/L (ref 136–145)
SPECIMEN OUTDATE: NORMAL
TACROLIMUS BLD-MCNC: 10.6 NG/ML (ref 5–15)
WBC # BLD AUTO: 1.62 K/UL (ref 3.9–12.7)

## 2024-10-24 PROCEDURE — 96372 THER/PROPH/DIAG INJ SC/IM: CPT

## 2024-10-24 PROCEDURE — 80053 COMPREHEN METABOLIC PANEL: CPT | Performed by: INTERNAL MEDICINE

## 2024-10-24 PROCEDURE — 80197 ASSAY OF TACROLIMUS: CPT | Performed by: INTERNAL MEDICINE

## 2024-10-24 PROCEDURE — 63600175 PHARM REV CODE 636 W HCPCS: Mod: JZ,JB,JG | Performed by: PHYSICIAN ASSISTANT

## 2024-10-24 PROCEDURE — 63600175 PHARM REV CODE 636 W HCPCS: Performed by: INTERNAL MEDICINE

## 2024-10-24 PROCEDURE — 99999 PR PBB SHADOW E&M-EST. PATIENT-LVL IV: CPT | Mod: PBBFAC,,, | Performed by: PHYSICIAN ASSISTANT

## 2024-10-24 PROCEDURE — 85007 BL SMEAR W/DIFF WBC COUNT: CPT | Performed by: INTERNAL MEDICINE

## 2024-10-24 PROCEDURE — 86901 BLOOD TYPING SEROLOGIC RH(D): CPT | Performed by: INTERNAL MEDICINE

## 2024-10-24 PROCEDURE — 36592 COLLECT BLOOD FROM PICC: CPT

## 2024-10-24 PROCEDURE — A4216 STERILE WATER/SALINE, 10 ML: HCPCS | Performed by: INTERNAL MEDICINE

## 2024-10-24 PROCEDURE — 87799 DETECT AGENT NOS DNA QUANT: CPT | Performed by: INTERNAL MEDICINE

## 2024-10-24 PROCEDURE — 25000003 PHARM REV CODE 250: Performed by: INTERNAL MEDICINE

## 2024-10-24 PROCEDURE — 84100 ASSAY OF PHOSPHORUS: CPT | Performed by: INTERNAL MEDICINE

## 2024-10-24 PROCEDURE — 85027 COMPLETE CBC AUTOMATED: CPT | Performed by: INTERNAL MEDICINE

## 2024-10-24 PROCEDURE — 83735 ASSAY OF MAGNESIUM: CPT | Performed by: INTERNAL MEDICINE

## 2024-10-24 RX ORDER — SODIUM CHLORIDE 0.9 % (FLUSH) 0.9 %
10 SYRINGE (ML) INJECTION
OUTPATIENT
Start: 2024-10-24

## 2024-10-24 RX ORDER — HEPARIN 100 UNIT/ML
500 SYRINGE INTRAVENOUS
Status: DISCONTINUED | OUTPATIENT
Start: 2024-10-24 | End: 2024-10-24 | Stop reason: HOSPADM

## 2024-10-24 RX ORDER — SODIUM CHLORIDE 0.9 % (FLUSH) 0.9 %
10 SYRINGE (ML) INJECTION
Status: DISCONTINUED | OUTPATIENT
Start: 2024-10-24 | End: 2024-10-24 | Stop reason: HOSPADM

## 2024-10-24 RX ORDER — HEPARIN 100 UNIT/ML
500 SYRINGE INTRAVENOUS
OUTPATIENT
Start: 2024-10-24

## 2024-10-24 RX ADMIN — HEPARIN 500 UNITS: 100 SYRINGE at 10:10

## 2024-10-24 RX ADMIN — Medication 10 ML: at 10:10

## 2024-10-24 RX ADMIN — FILGRASTIM-SNDZ 300 MCG: 300 INJECTION, SOLUTION INTRAVENOUS; SUBCUTANEOUS at 12:10

## 2024-10-24 NOTE — PROGRESS NOTES
//Section of Hematology and Stem Cell Transplantation    Post-Transplantation Follow Up Visit     10/24/24    Transplant History:   Primary oncologist: Paco Hickey MD  Primary oncologic diagnosis: MDS  Transplant date: 9/19/2024  Donor: haploidentical  Blood Type (Patient): B +  Blood Type (Donor): A +  CMV (Patient): Positive  CMV (Donor): Positive  Graft source: Bone marrow  CD34+ cell dose: 3.55x10^6  Conditioning Regimen: Fludarabine plus melphalan 100 mg/m2 + 2Gy TBI  GVHD prophylaxis: Post-transplant cyclophosphamide, Tacrolimus, MMF  Immediate post-transplant complications: Patient experienced expected GI toxicities with C diff negative diarrhea and nausea, neutropenic fever with negative infectious work up, expected cytopenias requiring transfusions, electrolyte abnormalities requiring replacement, volume overload requiring diuresis, intermittent SOB from pulmonary edema requiring 02, and a hemorrhoid flare up. Diarrhea and SOB improved towards the end of the hospital stay.     History of Present Ilness:   Guillaume Salinas (Guillaume) is a pleasant 69 y.o.male with a past medical history of MDS who is status post haploidentical stem cell transplantation conditioned with FluMel 100 + PTCy+ 2Gy TBI who is currently 35 who presents for post-transplant follow up. He was discharged from the hospital yesterday, October 16th (day 27).     Presents with his wife and daughter. He declined  services, opted for his wife/daughter to help. Generally doing okay, remains fatigued with minimal appetite. Pill burden often leaves him feeling full with an uncomfortable stomach'. No vomiting/diarrhea. No fevers/infectious concerns.    Not sleeping well at night - but reports ~3-4 hour naps during the day and still has some 'brain fog' at times during the day and night, often soon after waking. Will work on shortening naps throughout the day to help improve sleep cycles at night.     PAST MEDICAL HISTORY:    Past Medical History:   Diagnosis Date    Anticoagulant long-term use     Coronary artery disease     Hypertension     Myelodysplastic syndrome     Peripheral vascular disease, unspecified        PAST SURGICAL HISTORY:   Past Surgical History:   Procedure Laterality Date    BONE MARROW BIOPSY Left 2023    Procedure: Biopsy-bone marrow;  Surgeon: Harry Diamond MD;  Location: [facility] OR;  Service: Oncology;  Laterality: Left;    COLONOSCOPY N/A 2022    Procedure: COLONOSCOPY Golytely Vaccinated will bring cards;  Surgeon: Dereje Simon MD;  Location: [facility] ENDO;  Service: Endoscopy;  Laterality: N/A;  Do not cancel this order    INSERTION OF LEMONS CATHETER Right 2024    Procedure: INSERTION, CATHETER, CENTRAL VENOUS, LEMONS TRIPLE LUMEN;  Surgeon: Kg Patten MD;  Location: [address];  Service: General;  Laterality: Right;       PAST SOCIAL HISTORY:  Social History     Tobacco Use    Smoking status: Former     Current packs/day: 0.00     Average packs/day: 0.3 packs/day for 50.0 years (12.5 ttl pk-yrs)     Types: Cigarettes     Start date: 3/1/1973     Quit date: 3/1/2023     Years since quittin.6     Passive exposure: Past    Smokeless tobacco: Never   Substance Use Topics    Alcohol use: Not Currently    Drug use: Never       FAMILY HISTORY:  Family History   Problem Relation Name Age of Onset    Hypertension Mother      Cancer Father      Cancer Brother 9     No Known Problems Daughter      No Known Problems Daughter      No Known Problems Son         CURRENT MEDICATIONS:   Current Outpatient Medications   Medication Sig    acyclovir (ZOVIRAX) 800 MG Tab Take 1 tablet (800 mg total) by mouth 2 (two) times daily.    carvediloL (COREG) 6.25 MG tablet Take 1 tablet (6.25 mg total) by mouth 2 (two) times daily.    gabapentin (NEURONTIN) 300 MG capsule Take 2 capsules (600 mg total) by mouth 2 (two) times daily.    letermovir (PREVYMIS) 480 mg Tab Take 1 tablet (480 mg  total) by mouth Daily.    LIDOcaine (LIDODERM) 5 % Place 1 patch onto the skin once daily. Remove & Discard patch within 12 hours or as directed by MD. Place patch to left shoulder.    loperamide (IMODIUM) 2 mg capsule Take 1 capsule (2 mg total) by mouth 2 (two) times daily as needed for Diarrhea.    magnesium oxide (MAG-OX) 400 mg (241.3 mg magnesium) tablet Take 2 tablets (800 mg total) by mouth 2 (two) times daily.    mycophenolate (CELLCEPT) 250 mg Cap Take 4 capsules (1,000 mg total) by mouth 3 (three) times daily. STOP 10-24-24    ondansetron (ZOFRAN-ODT) 8 MG TbDL DISSOLVE 1 tablet (8 mg total) by mouth every 8 (eight) hours.    pantoprazole (PROTONIX) 40 MG tablet Take 1 tablet (40 mg total) by mouth once daily.    posaconazole (NOXAFIL) 100 mg TbEC tablet Take 3 tablets (300 mg total) by mouth once daily.    prochlorperazine (COMPAZINE) 5 MG tablet Take 1 tablet (5 mg total) by mouth 4 (four) times daily as needed for Nausea.    sulfamethoxazole-trimethoprim 800-160mg (BACTRIM DS) 800-160 mg Tab Take 1 tablet by mouth every Mon, Wed, Fri. START 10/21/24    tacrolimus (PROGRAF) 0.5 MG Cap Take 1 capsule (0.5 mg total) by mouth every morning AND 2 capsules (1 mg total) every evening.    traMADoL (ULTRAM) 50 mg tablet Take 1 tablet (50 mg total) by mouth every 6 (six) hours as needed for Pain.    traZODone (DESYREL) 50 MG tablet Take 1 tablet (50 mg total) by mouth nightly as needed for Insomnia.     No current facility-administered medications for this visit.     Facility-Administered Medications Ordered in Other Visits   Medication    heparin, porcine (PF) 100 unit/mL injection flush 500 Units    sodium chloride 0.9% flush 10 mL       ALLERGIES:   Review of patient's allergies indicates:  No Known Allergies    GVHD Review of Systems:     Pertinent positives and negatives included in the HPI. Otherwise a 14 point review of systems is negative. GVHD review of systems recorded in BMT flowsheet.     Physical  Exam:     Vitals:    10/24/24 1024   BP: 119/73   Pulse: 84   Resp: 18     General: Appears well, NAD  HEENT: MMM, no OP lesions  Pulmonary: CTAB, no increased work of breathing, no W/R/C  Cardiovascular: S1S2 normal, RRR, no M/R/G  Abdominal: Soft, NT, ND, BS+, no HSM  Extremities: No C/C/E  Neurological: AAOx4, grossly normal, no focal deficits  Dermatologic: No appreciable rashes or lesions    ECOG Performance Status: (foot note - ECOG PS provided by [employer]cology [employer]) 1 - Symptomatic but completely ambulatory    Karnofsky Performance Score:  70%- Cares for Self: Unable to Carry on Normal Activity or Active Work    Labs:   Lab Results   Component Value Date    WBC 1.62 (LL) 10/24/2024    RBC 2.92 (L) 10/24/2024    HGB 9.0 (L) 10/24/2024    HCT 26.9 (L) 10/24/2024    MCV 92 10/24/2024    MCH 30.8 10/24/2024    MCHC 33.5 10/24/2024    RDW 14.8 (H) 10/24/2024    PLT 32 (LL) 10/24/2024    MPV 13.5 (H) 10/24/2024    GRAN Test Not Performed 10/21/2024    GRAN 69.0 10/21/2024    LYMPH Test Not Performed 10/21/2024    LYMPH 6.0 (L) 10/21/2024    MONO Test Not Performed 10/21/2024    MONO 24.0 (H) 10/21/2024    EOS Test Not Performed 10/21/2024    BASO Test Not Performed 10/21/2024    EOSINOPHIL 0.0 10/21/2024    BASOPHIL 0.0 10/21/2024       Sodium   Date Value Ref Range Status   10/24/2024 139 136 - 145 mmol/L Final     Potassium   Date Value Ref Range Status   10/24/2024 4.0 3.5 - 5.1 mmol/L Final     Chloride   Date Value Ref Range Status   10/24/2024 106 95 - 110 mmol/L Final     CO2   Date Value Ref Range Status   10/24/2024 28 23 - 29 mmol/L Final     Glucose   Date Value Ref Range Status   10/24/2024 120 (H) 70 - 110 mg/dL Final     BUN   Date Value Ref Range Status   10/24/2024 11 8 - 23 mg/dL Final     Creatinine   Date Value Ref Range Status   10/24/2024 0.8 0.5 - 1.4 mg/dL Final     Calcium   Date Value Ref Range Status   10/24/2024 8.7 8.7 - 10.5 mg/dL Final     Total Protein   Date Value Ref  Range Status   10/24/2024 5.6 (L) 6.0 - 8.4 g/dL Final     Albumin   Date Value Ref Range Status   10/24/2024 3.3 (L) 3.5 - 5.2 g/dL Final     Total Bilirubin   Date Value Ref Range Status   10/24/2024 0.5 0.1 - 1.0 mg/dL Final     Comment:     For infants and newborns, interpretation of results should be based  on gestational age, weight and in agreement with clinical  observations.    Premature Infant recommended reference ranges:  Up to 24 hours.............<8.0 mg/dL  Up to 48 hours............<12.0 mg/dL  3-5 days..................<15.0 mg/dL  6-29 days.................<15.0 mg/dL       Alkaline Phosphatase   Date Value Ref Range Status   10/24/2024 72 40 - 150 U/L Final     AST   Date Value Ref Range Status   10/24/2024 42 (H) 10 - 40 U/L Final     ALT   Date Value Ref Range Status   10/24/2024 48 (H) 10 - 44 U/L Final     Anion Gap   Date Value Ref Range Status   10/24/2024 5 (L) 8 - 16 mmol/L Final     eGFR if    Date Value Ref Range Status   08/27/2021 >60 >60 mL/min/1.73 m^2 Final   08/27/2021 >60 >60 mL/min/1.73 m^2 Final   08/27/2021 >60 >60 mL/min/1.73 m^2 Final     eGFR if non    Date Value Ref Range Status   08/27/2021 57 (A) >60 mL/min/1.73 m^2 Final     Comment:     Calculation used to obtain the estimated glomerular filtration  rate (eGFR) is the CKD-EPI equation.      08/27/2021 57 (A) >60 mL/min/1.73 m^2 Final     Comment:     Calculation used to obtain the estimated glomerular filtration  rate (eGFR) is the CKD-EPI equation.      08/27/2021 57 (A) >60 mL/min/1.73 m^2 Final     Comment:     Calculation used to obtain the estimated glomerular filtration  rate (eGFR) is the CKD-EPI equation.          Imaging:   Hospital imaging reviewed.    Pathology:  Prior pathology reviewed. Plan for day +30 bone marrow biopsy on 10/23/24    Acute GVHD Scoring:  GVHD Acute Assessment     10/24/2024 No acute GVHD.     Assessment and Plan:   Guillaume Salinas (Guillaume) is a  pleasant 69 y.o.male with a past medical history of MDS s/p haplo SCT who presents for post-transplant follow up.    MDS: Status post treatment with azacitidine 75 mg/m2 daily x7 days plus venetoclax 100mg (voriconazole) daily x14 of 28 days.Venetoclax decreased to 7 days with cycle 3 until completion of 11 cycles. Primary oncologist is Paco Hickey MD who will be managing primary underlying disease.     Status post allogeneic stem cell transplantation: As noted above, status post haploidentical stem cell transplantation conditioned with FluMel 100 + PTCy+ 2Gy TBI . Currently Day+ 35. Engrafted on 10/09/24 day +20.  Day 30 bmbx was delayed at endoscopy as the patient was not NPO, rescheduled for 11/5 in clinic.     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   Current tacro dose: 0.5 mg every morning, and 1 mg nightly   Last tacro level: 10.6  Adjustments: No changes    Immunosuppression: Prevention with posaconazole, acyclovir. CMV prophylaxis with letermovir. PJP prophyalxis Bactrim MWF. Continue weekly monitoring of CMV and EBV.  Last CMV: Not-detected 10/14, last EBV: Not-detected 10/14.  Active infections: N/A    Pancytopenia: Due to underlying disease and chemotherapy. Transfuse for Hgb <7 g/dL and platelets <10k.  Filgrastim 300mcg/0.5 subcutaneously daily x 5 days, starting today.    Hypomagnesemia: Related to tacro, poor po intake. Increase MagOx to 800mg twice daily.      Chemotherapy Induced Nausea: Improving. Will work to minimize pill burden - Discontinue olanzazpine 5mg qhs. Discontinue scheduled compazine and adjust to PRN. Continue ondansetron 8mg q8hrs, will taper down if nausea remains improved.     Anxiety, Restlessness: Noted since discharge by family. Discontinuing scheduled zyprexa/compazine as this may be medication induced. Monitor for improvement end of week.    Follow up: Twice weekly follow up with labs.    Medical  Complexity:     Visit today included increased complexity associated with the care of the episodic problems addressed above and managing the longitudinal care of the patient due to the serious and/or complex managed problem(s) history of Allo SCT.      Orders Placed:           No orders of the defined types were placed in this encounter.      Follow Up:      Twice weekly follow up as scheduled.       Sondra Jimenez [facility], PA-C  Hematology, Oncology, and Stem Cell Transplantation  [facility]

## 2024-10-25 ENCOUNTER — INFUSION (OUTPATIENT)
Dept: INFUSION THERAPY | Facility: HOSPITAL | Age: 69
End: 2024-10-25
Payer: MEDICARE

## 2024-10-25 DIAGNOSIS — D70.1 CHEMOTHERAPY-INDUCED NEUTROPENIA: ICD-10-CM

## 2024-10-25 DIAGNOSIS — Z94.81 S/P ALLOGENEIC BONE MARROW TRANSPLANT: ICD-10-CM

## 2024-10-25 DIAGNOSIS — T45.1X5A CHEMOTHERAPY-INDUCED NEUTROPENIA: ICD-10-CM

## 2024-10-25 DIAGNOSIS — D46.9 MYELODYSPLASTIC SYNDROME: Primary | ICD-10-CM

## 2024-10-25 PROCEDURE — 96372 THER/PROPH/DIAG INJ SC/IM: CPT

## 2024-10-25 PROCEDURE — 63600175 PHARM REV CODE 636 W HCPCS: Mod: JZ,JB,JG | Performed by: PHYSICIAN ASSISTANT

## 2024-10-25 RX ADMIN — FILGRASTIM-SNDZ 300 MCG: 300 INJECTION, SOLUTION INTRAVENOUS; SUBCUTANEOUS at 09:10

## 2024-10-25 NOTE — NURSING
Pt here for Zarxio injection, tolerated well to abdomen, pt stayed in WC for injection, pt to rtc tomorrow for injection

## 2024-10-26 ENCOUNTER — INFUSION (OUTPATIENT)
Dept: INFUSION THERAPY | Facility: HOSPITAL | Age: 69
End: 2024-10-26
Payer: MEDICARE

## 2024-10-26 DIAGNOSIS — Z94.81 S/P ALLOGENEIC BONE MARROW TRANSPLANT: ICD-10-CM

## 2024-10-26 DIAGNOSIS — D46.9 MYELODYSPLASTIC SYNDROME: Primary | ICD-10-CM

## 2024-10-26 DIAGNOSIS — T45.1X5A CHEMOTHERAPY-INDUCED NEUTROPENIA: ICD-10-CM

## 2024-10-26 DIAGNOSIS — D70.1 CHEMOTHERAPY-INDUCED NEUTROPENIA: ICD-10-CM

## 2024-10-26 PROCEDURE — 96372 THER/PROPH/DIAG INJ SC/IM: CPT

## 2024-10-26 PROCEDURE — 63600175 PHARM REV CODE 636 W HCPCS: Mod: JZ,JB,JG | Performed by: PHYSICIAN ASSISTANT

## 2024-10-26 RX ADMIN — FILGRASTIM-SNDZ 300 MCG: 300 INJECTION, SOLUTION INTRAVENOUS; SUBCUTANEOUS at 09:10

## 2024-10-28 ENCOUNTER — PATIENT MESSAGE (OUTPATIENT)
Dept: HEMATOLOGY/ONCOLOGY | Facility: CLINIC | Age: 69
End: 2024-10-28

## 2024-10-28 ENCOUNTER — CLINICAL SUPPORT (OUTPATIENT)
Dept: HEMATOLOGY/ONCOLOGY | Facility: CLINIC | Age: 69
End: 2024-10-28
Payer: MEDICARE

## 2024-10-28 ENCOUNTER — TELEPHONE (OUTPATIENT)
Dept: HEMATOLOGY/ONCOLOGY | Facility: CLINIC | Age: 69
End: 2024-10-28

## 2024-10-28 ENCOUNTER — OFFICE VISIT (OUTPATIENT)
Dept: HEMATOLOGY/ONCOLOGY | Facility: CLINIC | Age: 69
End: 2024-10-28
Payer: MEDICARE

## 2024-10-28 ENCOUNTER — INFUSION (OUTPATIENT)
Dept: INFUSION THERAPY | Facility: HOSPITAL | Age: 69
End: 2024-10-28
Payer: MEDICARE

## 2024-10-28 VITALS
OXYGEN SATURATION: 96 % | DIASTOLIC BLOOD PRESSURE: 64 MMHG | SYSTOLIC BLOOD PRESSURE: 122 MMHG | HEART RATE: 80 BPM | HEIGHT: 69 IN | WEIGHT: 148.13 LBS | RESPIRATION RATE: 18 BRPM | BODY MASS INDEX: 21.94 KG/M2

## 2024-10-28 DIAGNOSIS — R11.2 CHEMOTHERAPY-INDUCED NAUSEA AND VOMITING: ICD-10-CM

## 2024-10-28 DIAGNOSIS — Z94.84 IMMUNOCOMPROMISED STATE ASSOCIATED WITH STEM CELL TRANSPLANT: ICD-10-CM

## 2024-10-28 DIAGNOSIS — D46.9 MYELODYSPLASTIC SYNDROME: ICD-10-CM

## 2024-10-28 DIAGNOSIS — Z94.81 HISTORY OF ALLOGENEIC BONE MARROW TRANSPLANT: Primary | ICD-10-CM

## 2024-10-28 DIAGNOSIS — E83.42 HYPOMAGNESEMIA: ICD-10-CM

## 2024-10-28 DIAGNOSIS — D89.810 ACUTE GVHD: ICD-10-CM

## 2024-10-28 DIAGNOSIS — Z76.82 STEM CELL TRANSPLANT CANDIDATE: ICD-10-CM

## 2024-10-28 DIAGNOSIS — D61.818 PANCYTOPENIA: ICD-10-CM

## 2024-10-28 DIAGNOSIS — D84.822 IMMUNOCOMPROMISED STATE ASSOCIATED WITH STEM CELL TRANSPLANT: ICD-10-CM

## 2024-10-28 DIAGNOSIS — R30.0 DYSURIA: ICD-10-CM

## 2024-10-28 DIAGNOSIS — T45.1X5A CHEMOTHERAPY-INDUCED NAUSEA AND VOMITING: ICD-10-CM

## 2024-10-28 DIAGNOSIS — D46.9 MYELODYSPLASTIC SYNDROME: Primary | ICD-10-CM

## 2024-10-28 LAB
ABO + RH BLD: NORMAL
ALBUMIN SERPL BCP-MCNC: 3.4 G/DL (ref 3.5–5.2)
ALP SERPL-CCNC: 86 U/L (ref 40–150)
ALT SERPL W/O P-5'-P-CCNC: 46 U/L (ref 10–44)
ANION GAP SERPL CALC-SCNC: 7 MMOL/L (ref 8–16)
ANISOCYTOSIS BLD QL SMEAR: ABNORMAL
AST SERPL-CCNC: 40 U/L (ref 10–40)
BACTERIA #/AREA URNS AUTO: ABNORMAL /HPF
BASO STIPL BLD QL SMEAR: ABNORMAL
BASOPHILS NFR BLD: 0 % (ref 0–1.9)
BILIRUB SERPL-MCNC: 0.6 MG/DL (ref 0.1–1)
BILIRUB UR QL STRIP: NEGATIVE
BLD GP AB SCN CELLS X3 SERPL QL: NORMAL
BUN SERPL-MCNC: 10 MG/DL (ref 8–23)
CALCIUM SERPL-MCNC: 8.8 MG/DL (ref 8.7–10.5)
CAOX CRY UR QL COMP ASSIST: ABNORMAL
CHLORIDE SERPL-SCNC: 106 MMOL/L (ref 95–110)
CLARITY UR REFRACT.AUTO: ABNORMAL
CO2 SERPL-SCNC: 27 MMOL/L (ref 23–29)
COLOR UR AUTO: YELLOW
CREAT SERPL-MCNC: 0.9 MG/DL (ref 0.5–1.4)
DACRYOCYTES BLD QL SMEAR: ABNORMAL
DIFFERENTIAL METHOD BLD: ABNORMAL
EOSINOPHIL NFR BLD: 1 % (ref 0–8)
ERYTHROCYTE [DISTWIDTH] IN BLOOD BY AUTOMATED COUNT: 17.8 % (ref 11.5–14.5)
EST. GFR  (NO RACE VARIABLE): >60 ML/MIN/1.73 M^2
GLUCOSE SERPL-MCNC: 145 MG/DL (ref 70–110)
GLUCOSE UR QL STRIP: NEGATIVE
HCT VFR BLD AUTO: 27.9 % (ref 40–54)
HGB BLD-MCNC: 8.8 G/DL (ref 14–18)
HGB UR QL STRIP: ABNORMAL
HYALINE CASTS UR QL AUTO: 0 /LPF
IMM GRANULOCYTES # BLD AUTO: ABNORMAL K/UL (ref 0–0.04)
IMM GRANULOCYTES NFR BLD AUTO: ABNORMAL % (ref 0–0.5)
KETONES UR QL STRIP: NEGATIVE
LEUKOCYTE ESTERASE UR QL STRIP: NEGATIVE
LYMPHOCYTES NFR BLD: 12 % (ref 18–48)
MAGNESIUM SERPL-MCNC: 1.6 MG/DL (ref 1.6–2.6)
MCH RBC QN AUTO: 30.1 PG (ref 27–31)
MCHC RBC AUTO-ENTMCNC: 31.5 G/DL (ref 32–36)
MCV RBC AUTO: 96 FL (ref 82–98)
METAMYELOCYTES NFR BLD MANUAL: 1 %
MICROSCOPIC COMMENT: ABNORMAL
MONOCYTES NFR BLD: 14 % (ref 4–15)
MYELOCYTES NFR BLD MANUAL: 2 %
NEUTROPHILS NFR BLD: 67 % (ref 38–73)
NEUTS BAND NFR BLD MANUAL: 2 %
NITRITE UR QL STRIP: NEGATIVE
NON-SQ EPI CELLS #/AREA URNS AUTO: 1 /HPF
NRBC BLD-RTO: 1 /100 WBC
OVALOCYTES BLD QL SMEAR: ABNORMAL
PH UR STRIP: 6 [PH] (ref 5–8)
PHOSPHATE SERPL-MCNC: 3 MG/DL (ref 2.7–4.5)
PLATELET # BLD AUTO: 34 K/UL (ref 150–450)
PMV BLD AUTO: 13 FL (ref 9.2–12.9)
POIKILOCYTOSIS BLD QL SMEAR: SLIGHT
POLYCHROMASIA BLD QL SMEAR: ABNORMAL
POTASSIUM SERPL-SCNC: 3.8 MMOL/L (ref 3.5–5.1)
PROMYELOCYTES NFR BLD MANUAL: 1 %
PROT SERPL-MCNC: 5.6 G/DL (ref 6–8.4)
PROT UR QL STRIP: ABNORMAL
RBC # BLD AUTO: 2.92 M/UL (ref 4.6–6.2)
RBC #/AREA URNS AUTO: 1 /HPF (ref 0–4)
SODIUM SERPL-SCNC: 140 MMOL/L (ref 136–145)
SP GR UR STRIP: 1.02 (ref 1–1.03)
SPECIMEN OUTDATE: NORMAL
SQUAMOUS #/AREA URNS AUTO: 1 /HPF
TACROLIMUS BLD-MCNC: 12.4 NG/ML (ref 5–15)
URN SPEC COLLECT METH UR: ABNORMAL
WBC # BLD AUTO: 7.21 K/UL (ref 3.9–12.7)
WBC #/AREA URNS AUTO: 6 /HPF (ref 0–5)

## 2024-10-28 PROCEDURE — 99999 PR PBB SHADOW E&M-EST. PATIENT-LVL IV: CPT | Mod: PBBFAC,,, | Performed by: PHYSICIAN ASSISTANT

## 2024-10-28 PROCEDURE — 25000003 PHARM REV CODE 250: Performed by: INTERNAL MEDICINE

## 2024-10-28 PROCEDURE — A4216 STERILE WATER/SALINE, 10 ML: HCPCS | Performed by: INTERNAL MEDICINE

## 2024-10-28 PROCEDURE — 84100 ASSAY OF PHOSPHORUS: CPT | Performed by: INTERNAL MEDICINE

## 2024-10-28 PROCEDURE — 85027 COMPLETE CBC AUTOMATED: CPT | Performed by: INTERNAL MEDICINE

## 2024-10-28 PROCEDURE — 87799 DETECT AGENT NOS DNA QUANT: CPT | Performed by: PHYSICIAN ASSISTANT

## 2024-10-28 PROCEDURE — 83735 ASSAY OF MAGNESIUM: CPT | Performed by: INTERNAL MEDICINE

## 2024-10-28 PROCEDURE — 81001 URINALYSIS AUTO W/SCOPE: CPT | Performed by: PHYSICIAN ASSISTANT

## 2024-10-28 PROCEDURE — 80197 ASSAY OF TACROLIMUS: CPT | Performed by: INTERNAL MEDICINE

## 2024-10-28 PROCEDURE — 63600175 PHARM REV CODE 636 W HCPCS: Performed by: INTERNAL MEDICINE

## 2024-10-28 PROCEDURE — 99999 PR PBB SHADOW E&M-EST. PATIENT-LVL II: CPT | Mod: PBBFAC,,,

## 2024-10-28 PROCEDURE — 87799 DETECT AGENT NOS DNA QUANT: CPT | Mod: 91 | Performed by: INTERNAL MEDICINE

## 2024-10-28 PROCEDURE — 86850 RBC ANTIBODY SCREEN: CPT | Performed by: INTERNAL MEDICINE

## 2024-10-28 PROCEDURE — 80053 COMPREHEN METABOLIC PANEL: CPT | Performed by: INTERNAL MEDICINE

## 2024-10-28 PROCEDURE — 85007 BL SMEAR W/DIFF WBC COUNT: CPT | Performed by: INTERNAL MEDICINE

## 2024-10-28 PROCEDURE — 36592 COLLECT BLOOD FROM PICC: CPT

## 2024-10-28 RX ORDER — SODIUM CHLORIDE 0.9 % (FLUSH) 0.9 %
10 SYRINGE (ML) INJECTION
Status: DISCONTINUED | OUTPATIENT
Start: 2024-10-28 | End: 2024-10-28 | Stop reason: HOSPADM

## 2024-10-28 RX ORDER — BUDESONIDE 3 MG/1
3 CAPSULE, COATED PELLETS ORAL 3 TIMES DAILY
Qty: 60 CAPSULE | Refills: 2 | Status: SHIPPED | OUTPATIENT
Start: 2024-10-28 | End: 2025-10-28

## 2024-10-28 RX ORDER — LANOLIN ALCOHOL/MO/W.PET/CERES
800 CREAM (GRAM) TOPICAL 2 TIMES DAILY
Qty: 120 TABLET | Refills: 11 | Status: SHIPPED | OUTPATIENT
Start: 2024-10-28

## 2024-10-28 RX ORDER — HEPARIN 100 UNIT/ML
500 SYRINGE INTRAVENOUS
Status: DISCONTINUED | OUTPATIENT
Start: 2024-10-28 | End: 2024-10-28 | Stop reason: HOSPADM

## 2024-10-28 RX ORDER — HEPARIN 100 UNIT/ML
500 SYRINGE INTRAVENOUS
Status: CANCELLED | OUTPATIENT
Start: 2024-10-28

## 2024-10-28 RX ORDER — SODIUM CHLORIDE 0.9 % (FLUSH) 0.9 %
10 SYRINGE (ML) INJECTION
Status: CANCELLED | OUTPATIENT
Start: 2024-10-28

## 2024-10-28 RX ADMIN — HEPARIN 500 UNITS: 100 SYRINGE at 09:10

## 2024-10-28 RX ADMIN — Medication 10 ML: at 09:10

## 2024-10-29 ENCOUNTER — DOCUMENTATION ONLY (OUTPATIENT)
Dept: HEMATOLOGY/ONCOLOGY | Facility: CLINIC | Age: 69
End: 2024-10-29
Payer: MEDICARE

## 2024-10-29 ENCOUNTER — PATIENT MESSAGE (OUTPATIENT)
Dept: HEMATOLOGY/ONCOLOGY | Facility: CLINIC | Age: 69
End: 2024-10-29
Payer: MEDICARE

## 2024-10-29 RX ORDER — TACROLIMUS 0.5 MG/1
CAPSULE ORAL
Qty: 60 CAPSULE | Refills: 11 | Status: SHIPPED | OUTPATIENT
Start: 2024-10-29 | End: 2025-10-29

## 2024-10-31 ENCOUNTER — INFUSION (OUTPATIENT)
Dept: INFUSION THERAPY | Facility: HOSPITAL | Age: 69
End: 2024-10-31
Payer: MEDICARE

## 2024-10-31 ENCOUNTER — OFFICE VISIT (OUTPATIENT)
Dept: HEMATOLOGY/ONCOLOGY | Facility: CLINIC | Age: 69
End: 2024-10-31
Payer: MEDICARE

## 2024-10-31 ENCOUNTER — PATIENT MESSAGE (OUTPATIENT)
Dept: HEMATOLOGY/ONCOLOGY | Facility: CLINIC | Age: 69
End: 2024-10-31

## 2024-10-31 ENCOUNTER — DOCUMENTATION ONLY (OUTPATIENT)
Dept: HEMATOLOGY/ONCOLOGY | Facility: CLINIC | Age: 69
End: 2024-10-31

## 2024-10-31 VITALS
RESPIRATION RATE: 18 BRPM | BODY MASS INDEX: 21.8 KG/M2 | HEART RATE: 65 BPM | HEIGHT: 69 IN | TEMPERATURE: 98 F | SYSTOLIC BLOOD PRESSURE: 111 MMHG | WEIGHT: 147.19 LBS | DIASTOLIC BLOOD PRESSURE: 56 MMHG | OXYGEN SATURATION: 96 %

## 2024-10-31 DIAGNOSIS — D46.9 MYELODYSPLASTIC SYNDROME: ICD-10-CM

## 2024-10-31 DIAGNOSIS — D84.81 IMMUNODEFICIENCY DUE TO CONDITIONS CLASSIFIED ELSEWHERE: ICD-10-CM

## 2024-10-31 DIAGNOSIS — Z76.82 STEM CELL TRANSPLANT CANDIDATE: ICD-10-CM

## 2024-10-31 DIAGNOSIS — D89.810 ACUTE GVHD: ICD-10-CM

## 2024-10-31 DIAGNOSIS — D61.818 PANCYTOPENIA: ICD-10-CM

## 2024-10-31 DIAGNOSIS — E83.42 HYPOMAGNESEMIA: ICD-10-CM

## 2024-10-31 DIAGNOSIS — D46.9 MYELODYSPLASTIC SYNDROME: Primary | ICD-10-CM

## 2024-10-31 DIAGNOSIS — Z94.81 HISTORY OF ALLOGENEIC BONE MARROW TRANSPLANT: Primary | ICD-10-CM

## 2024-10-31 LAB
ABO + RH BLD: NORMAL
ALBUMIN SERPL BCP-MCNC: 3.5 G/DL (ref 3.5–5.2)
ALP SERPL-CCNC: 83 U/L (ref 40–150)
ALT SERPL W/O P-5'-P-CCNC: 67 U/L (ref 10–44)
ANION GAP SERPL CALC-SCNC: 8 MMOL/L (ref 8–16)
ANISOCYTOSIS BLD QL SMEAR: SLIGHT
AST SERPL-CCNC: 53 U/L (ref 10–40)
BASOPHILS NFR BLD: 0 % (ref 0–1.9)
BILIRUB SERPL-MCNC: 0.6 MG/DL (ref 0.1–1)
BLD GP AB SCN CELLS X3 SERPL QL: NORMAL
BUN SERPL-MCNC: 16 MG/DL (ref 8–23)
CALCIUM SERPL-MCNC: 8.9 MG/DL (ref 8.7–10.5)
CHLORIDE SERPL-SCNC: 105 MMOL/L (ref 95–110)
CO2 SERPL-SCNC: 28 MMOL/L (ref 23–29)
CREAT SERPL-MCNC: 0.9 MG/DL (ref 0.5–1.4)
DACRYOCYTES BLD QL SMEAR: ABNORMAL
DIFFERENTIAL METHOD BLD: ABNORMAL
DOHLE BOD BLD QL SMEAR: PRESENT
EOSINOPHIL NFR BLD: 0 % (ref 0–8)
ERYTHROCYTE [DISTWIDTH] IN BLOOD BY AUTOMATED COUNT: 19.4 % (ref 11.5–14.5)
EST. GFR  (NO RACE VARIABLE): >60 ML/MIN/1.73 M^2
GLUCOSE SERPL-MCNC: 107 MG/DL (ref 70–110)
HCT VFR BLD AUTO: 28.1 % (ref 40–54)
HGB BLD-MCNC: 9.3 G/DL (ref 14–18)
HYPOCHROMIA BLD QL SMEAR: ABNORMAL
IMM GRANULOCYTES # BLD AUTO: ABNORMAL K/UL (ref 0–0.04)
IMM GRANULOCYTES NFR BLD AUTO: ABNORMAL % (ref 0–0.5)
LYMPHOCYTES NFR BLD: 7 % (ref 18–48)
MAGNESIUM SERPL-MCNC: 1.7 MG/DL (ref 1.6–2.6)
MCH RBC QN AUTO: 31.5 PG (ref 27–31)
MCHC RBC AUTO-ENTMCNC: 33.1 G/DL (ref 32–36)
MCV RBC AUTO: 95 FL (ref 82–98)
MONOCYTES NFR BLD: 13 % (ref 4–15)
NEUTROPHILS NFR BLD: 78 % (ref 38–73)
NEUTS BAND NFR BLD MANUAL: 2 %
NRBC BLD-RTO: 0 /100 WBC
OVALOCYTES BLD QL SMEAR: ABNORMAL
PHOSPHATE SERPL-MCNC: 4.1 MG/DL (ref 2.7–4.5)
PLATELET # BLD AUTO: 48 K/UL (ref 150–450)
PLATELET BLD QL SMEAR: ABNORMAL
PMV BLD AUTO: 12.6 FL (ref 9.2–12.9)
POIKILOCYTOSIS BLD QL SMEAR: SLIGHT
POLYCHROMASIA BLD QL SMEAR: ABNORMAL
POTASSIUM SERPL-SCNC: 4.1 MMOL/L (ref 3.5–5.1)
PROT SERPL-MCNC: 5.7 G/DL (ref 6–8.4)
RBC # BLD AUTO: 2.95 M/UL (ref 4.6–6.2)
SCHISTOCYTES BLD QL SMEAR: ABNORMAL
SCHISTOCYTES BLD QL SMEAR: PRESENT
SODIUM SERPL-SCNC: 141 MMOL/L (ref 136–145)
SPECIMEN OUTDATE: NORMAL
TACROLIMUS BLD-MCNC: 12.3 NG/ML (ref 5–15)
TOXIC GRANULES BLD QL SMEAR: PRESENT
WBC # BLD AUTO: 4.74 K/UL (ref 3.9–12.7)

## 2024-10-31 PROCEDURE — 36592 COLLECT BLOOD FROM PICC: CPT

## 2024-10-31 PROCEDURE — 25000003 PHARM REV CODE 250: Performed by: INTERNAL MEDICINE

## 2024-10-31 PROCEDURE — 87799 DETECT AGENT NOS DNA QUANT: CPT | Performed by: INTERNAL MEDICINE

## 2024-10-31 PROCEDURE — 86900 BLOOD TYPING SEROLOGIC ABO: CPT | Performed by: INTERNAL MEDICINE

## 2024-10-31 PROCEDURE — A4216 STERILE WATER/SALINE, 10 ML: HCPCS | Performed by: INTERNAL MEDICINE

## 2024-10-31 PROCEDURE — 85027 COMPLETE CBC AUTOMATED: CPT | Performed by: INTERNAL MEDICINE

## 2024-10-31 PROCEDURE — 80053 COMPREHEN METABOLIC PANEL: CPT | Performed by: INTERNAL MEDICINE

## 2024-10-31 PROCEDURE — 85007 BL SMEAR W/DIFF WBC COUNT: CPT | Performed by: INTERNAL MEDICINE

## 2024-10-31 PROCEDURE — 86901 BLOOD TYPING SEROLOGIC RH(D): CPT | Performed by: INTERNAL MEDICINE

## 2024-10-31 PROCEDURE — 99999 PR PBB SHADOW E&M-EST. PATIENT-LVL IV: CPT | Mod: PBBFAC,,, | Performed by: INTERNAL MEDICINE

## 2024-10-31 PROCEDURE — 80197 ASSAY OF TACROLIMUS: CPT | Performed by: INTERNAL MEDICINE

## 2024-10-31 PROCEDURE — 84100 ASSAY OF PHOSPHORUS: CPT | Performed by: INTERNAL MEDICINE

## 2024-10-31 PROCEDURE — 63600175 PHARM REV CODE 636 W HCPCS: Performed by: INTERNAL MEDICINE

## 2024-10-31 PROCEDURE — 83735 ASSAY OF MAGNESIUM: CPT | Performed by: INTERNAL MEDICINE

## 2024-10-31 RX ORDER — HEPARIN 100 UNIT/ML
500 SYRINGE INTRAVENOUS
Status: DISCONTINUED | OUTPATIENT
Start: 2024-10-31 | End: 2024-10-31 | Stop reason: HOSPADM

## 2024-10-31 RX ORDER — SODIUM CHLORIDE 0.9 % (FLUSH) 0.9 %
10 SYRINGE (ML) INJECTION
OUTPATIENT
Start: 2024-10-31

## 2024-10-31 RX ORDER — SODIUM CHLORIDE 0.9 % (FLUSH) 0.9 %
10 SYRINGE (ML) INJECTION
Status: DISCONTINUED | OUTPATIENT
Start: 2024-10-31 | End: 2024-10-31 | Stop reason: HOSPADM

## 2024-10-31 RX ORDER — HEPARIN 100 UNIT/ML
500 SYRINGE INTRAVENOUS
OUTPATIENT
Start: 2024-10-31

## 2024-10-31 RX ADMIN — Medication 10 ML: at 09:10

## 2024-10-31 RX ADMIN — HEPARIN 500 UNITS: 100 SYRINGE at 09:10

## 2024-11-01 ENCOUNTER — TELEPHONE (OUTPATIENT)
Dept: HEMATOLOGY/ONCOLOGY | Facility: CLINIC | Age: 69
End: 2024-11-01
Payer: MEDICARE

## 2024-11-01 DIAGNOSIS — D89.810 ACUTE GVHD: Primary | ICD-10-CM

## 2024-11-01 RX ORDER — PREDNISONE 10 MG/1
30 TABLET ORAL DAILY
Qty: 60 TABLET | Refills: 0 | Status: SHIPPED | OUTPATIENT
Start: 2024-11-01

## 2024-11-04 ENCOUNTER — INFUSION (OUTPATIENT)
Dept: INFUSION THERAPY | Facility: HOSPITAL | Age: 69
End: 2024-11-04
Payer: MEDICARE

## 2024-11-04 ENCOUNTER — DOCUMENTATION ONLY (OUTPATIENT)
Dept: HEMATOLOGY/ONCOLOGY | Facility: CLINIC | Age: 69
End: 2024-11-04

## 2024-11-04 ENCOUNTER — OFFICE VISIT (OUTPATIENT)
Dept: HEMATOLOGY/ONCOLOGY | Facility: CLINIC | Age: 69
End: 2024-11-04
Payer: MEDICARE

## 2024-11-04 ENCOUNTER — PATIENT MESSAGE (OUTPATIENT)
Dept: HEMATOLOGY/ONCOLOGY | Facility: CLINIC | Age: 69
End: 2024-11-04

## 2024-11-04 ENCOUNTER — CLINICAL SUPPORT (OUTPATIENT)
Dept: HEMATOLOGY/ONCOLOGY | Facility: CLINIC | Age: 69
End: 2024-11-04
Payer: MEDICARE

## 2024-11-04 VITALS
DIASTOLIC BLOOD PRESSURE: 57 MMHG | WEIGHT: 138.44 LBS | HEIGHT: 69 IN | RESPIRATION RATE: 20 BRPM | HEART RATE: 75 BPM | TEMPERATURE: 98 F | BODY MASS INDEX: 20.51 KG/M2 | OXYGEN SATURATION: 96 % | SYSTOLIC BLOOD PRESSURE: 103 MMHG

## 2024-11-04 DIAGNOSIS — D46.9 MYELODYSPLASTIC SYNDROME: ICD-10-CM

## 2024-11-04 DIAGNOSIS — R11.0 CHEMOTHERAPY-INDUCED NAUSEA: ICD-10-CM

## 2024-11-04 DIAGNOSIS — D61.818 PANCYTOPENIA: ICD-10-CM

## 2024-11-04 DIAGNOSIS — T45.1X5A CHEMOTHERAPY-INDUCED NAUSEA: ICD-10-CM

## 2024-11-04 DIAGNOSIS — Z94.81 HISTORY OF ALLOGENEIC BONE MARROW TRANSPLANT: ICD-10-CM

## 2024-11-04 DIAGNOSIS — E83.42 HYPOMAGNESEMIA: ICD-10-CM

## 2024-11-04 DIAGNOSIS — Z76.82 STEM CELL TRANSPLANT CANDIDATE: ICD-10-CM

## 2024-11-04 DIAGNOSIS — D84.822 IMMUNOCOMPROMISED STATE ASSOCIATED WITH STEM CELL TRANSPLANT: ICD-10-CM

## 2024-11-04 DIAGNOSIS — D84.81 IMMUNODEFICIENCY SECONDARY TO NEOPLASM: ICD-10-CM

## 2024-11-04 DIAGNOSIS — Z94.81 HISTORY OF ALLOGENEIC BONE MARROW TRANSPLANT: Primary | ICD-10-CM

## 2024-11-04 DIAGNOSIS — D89.810 ACUTE GVHD: ICD-10-CM

## 2024-11-04 DIAGNOSIS — D49.9 IMMUNODEFICIENCY SECONDARY TO NEOPLASM: ICD-10-CM

## 2024-11-04 DIAGNOSIS — D46.9 MYELODYSPLASTIC SYNDROME: Primary | ICD-10-CM

## 2024-11-04 DIAGNOSIS — G47.00 INSOMNIA, UNSPECIFIED TYPE: ICD-10-CM

## 2024-11-04 DIAGNOSIS — Z94.84 IMMUNOCOMPROMISED STATE ASSOCIATED WITH STEM CELL TRANSPLANT: ICD-10-CM

## 2024-11-04 LAB
ABO + RH BLD: NORMAL
ALBUMIN SERPL BCP-MCNC: 3.6 G/DL (ref 3.5–5.2)
ALP SERPL-CCNC: 92 U/L (ref 40–150)
ALT SERPL W/O P-5'-P-CCNC: 83 U/L (ref 10–44)
ANION GAP SERPL CALC-SCNC: 7 MMOL/L (ref 8–16)
AST SERPL-CCNC: 43 U/L (ref 10–40)
BASOPHILS # BLD AUTO: 0.01 K/UL (ref 0–0.2)
BASOPHILS NFR BLD: 0.2 % (ref 0–1.9)
BILIRUB SERPL-MCNC: 0.6 MG/DL (ref 0.1–1)
BLD GP AB SCN CELLS X3 SERPL QL: NORMAL
BUN SERPL-MCNC: 19 MG/DL (ref 8–23)
CALCIUM SERPL-MCNC: 9.3 MG/DL (ref 8.7–10.5)
CHLORIDE SERPL-SCNC: 103 MMOL/L (ref 95–110)
CMV DNA SPEC QL NAA+PROBE: NORMAL
CO2 SERPL-SCNC: 30 MMOL/L (ref 23–29)
CREAT SERPL-MCNC: 0.8 MG/DL (ref 0.5–1.4)
CYTOMEGALOVIRUS PCR, QUANT: NOT DETECTED IU/ML
DIFFERENTIAL METHOD BLD: ABNORMAL
EOSINOPHIL # BLD AUTO: 0 K/UL (ref 0–0.5)
EOSINOPHIL NFR BLD: 0.2 % (ref 0–8)
EPSTEIN-BARR VIRUS DNA: NORMAL
EPSTEIN-BARR VIRUS PCR, QUANT: NOT DETECTED IU/ML
ERYTHROCYTE [DISTWIDTH] IN BLOOD BY AUTOMATED COUNT: 22 % (ref 11.5–14.5)
EST. GFR  (NO RACE VARIABLE): >60 ML/MIN/1.73 M^2
GLUCOSE SERPL-MCNC: 108 MG/DL (ref 70–110)
HCT VFR BLD AUTO: 32.8 % (ref 40–54)
HGB BLD-MCNC: 10.6 G/DL (ref 14–18)
IMM GRANULOCYTES # BLD AUTO: 0.08 K/UL (ref 0–0.04)
IMM GRANULOCYTES NFR BLD AUTO: 1.4 % (ref 0–0.5)
LYMPHOCYTES # BLD AUTO: 0.2 K/UL (ref 1–4.8)
LYMPHOCYTES NFR BLD: 3.4 % (ref 18–48)
MAGNESIUM SERPL-MCNC: 2 MG/DL (ref 1.6–2.6)
MCH RBC QN AUTO: 31.7 PG (ref 27–31)
MCHC RBC AUTO-ENTMCNC: 32.3 G/DL (ref 32–36)
MCV RBC AUTO: 98 FL (ref 82–98)
MONOCYTES # BLD AUTO: 0.5 K/UL (ref 0.3–1)
MONOCYTES NFR BLD: 8.9 % (ref 4–15)
NEUTROPHILS # BLD AUTO: 4.9 K/UL (ref 1.8–7.7)
NEUTROPHILS NFR BLD: 85.9 % (ref 38–73)
NRBC BLD-RTO: 0 /100 WBC
PHOSPHATE SERPL-MCNC: 3.7 MG/DL (ref 2.7–4.5)
PLATELET # BLD AUTO: 100 K/UL (ref 150–450)
PMV BLD AUTO: 11.8 FL (ref 9.2–12.9)
POTASSIUM SERPL-SCNC: 4.4 MMOL/L (ref 3.5–5.1)
PROT SERPL-MCNC: 5.8 G/DL (ref 6–8.4)
RBC # BLD AUTO: 3.34 M/UL (ref 4.6–6.2)
SODIUM SERPL-SCNC: 140 MMOL/L (ref 136–145)
SPECIMEN OUTDATE: NORMAL
TACROLIMUS BLD-MCNC: 9.3 NG/ML (ref 5–15)
WBC # BLD AUTO: 5.64 K/UL (ref 3.9–12.7)

## 2024-11-04 PROCEDURE — 99999 PR PBB SHADOW E&M-EST. PATIENT-LVL IV: CPT | Mod: PBBFAC,,, | Performed by: INTERNAL MEDICINE

## 2024-11-04 PROCEDURE — 87799 DETECT AGENT NOS DNA QUANT: CPT | Performed by: INTERNAL MEDICINE

## 2024-11-04 PROCEDURE — 86850 RBC ANTIBODY SCREEN: CPT | Performed by: INTERNAL MEDICINE

## 2024-11-04 PROCEDURE — 80053 COMPREHEN METABOLIC PANEL: CPT | Performed by: INTERNAL MEDICINE

## 2024-11-04 PROCEDURE — 85025 COMPLETE CBC W/AUTO DIFF WBC: CPT | Performed by: INTERNAL MEDICINE

## 2024-11-04 PROCEDURE — A4216 STERILE WATER/SALINE, 10 ML: HCPCS | Performed by: INTERNAL MEDICINE

## 2024-11-04 PROCEDURE — 83735 ASSAY OF MAGNESIUM: CPT | Performed by: INTERNAL MEDICINE

## 2024-11-04 PROCEDURE — 63600175 PHARM REV CODE 636 W HCPCS: Performed by: INTERNAL MEDICINE

## 2024-11-04 PROCEDURE — 80197 ASSAY OF TACROLIMUS: CPT | Performed by: INTERNAL MEDICINE

## 2024-11-04 PROCEDURE — 25000003 PHARM REV CODE 250: Performed by: INTERNAL MEDICINE

## 2024-11-04 PROCEDURE — 99999 PR PBB SHADOW E&M-EST. PATIENT-LVL II: CPT | Mod: PBBFAC,,,

## 2024-11-04 PROCEDURE — 36592 COLLECT BLOOD FROM PICC: CPT

## 2024-11-04 PROCEDURE — 84100 ASSAY OF PHOSPHORUS: CPT | Performed by: INTERNAL MEDICINE

## 2024-11-04 RX ORDER — HEPARIN 100 UNIT/ML
500 SYRINGE INTRAVENOUS
Status: DISCONTINUED | OUTPATIENT
Start: 2024-11-04 | End: 2024-11-04 | Stop reason: HOSPADM

## 2024-11-04 RX ORDER — POSACONAZOLE 100 MG/1
300 TABLET, DELAYED RELEASE ORAL DAILY
Qty: 90 TABLET | Refills: 11 | Status: SHIPPED | OUTPATIENT
Start: 2024-11-04

## 2024-11-04 RX ORDER — ONDANSETRON 8 MG/1
8 TABLET, ORALLY DISINTEGRATING ORAL EVERY 8 HOURS PRN
Qty: 90 TABLET | Refills: 11 | Status: SHIPPED | OUTPATIENT
Start: 2024-11-04

## 2024-11-04 RX ORDER — HEPARIN 100 UNIT/ML
500 SYRINGE INTRAVENOUS
Status: CANCELLED | OUTPATIENT
Start: 2024-11-04

## 2024-11-04 RX ORDER — SULFAMETHOXAZOLE AND TRIMETHOPRIM 800; 160 MG/1; MG/1
1 TABLET ORAL
Qty: 12 TABLET | Refills: 11 | Status: SHIPPED | OUTPATIENT
Start: 2024-11-04

## 2024-11-04 RX ORDER — SODIUM CHLORIDE 0.9 % (FLUSH) 0.9 %
10 SYRINGE (ML) INJECTION
Status: DISCONTINUED | OUTPATIENT
Start: 2024-11-04 | End: 2024-11-04 | Stop reason: HOSPADM

## 2024-11-04 RX ORDER — ACYCLOVIR 800 MG/1
800 TABLET ORAL 2 TIMES DAILY
Qty: 60 TABLET | Refills: 11 | Status: SHIPPED | OUTPATIENT
Start: 2024-11-04 | End: 2025-11-04

## 2024-11-04 RX ORDER — SODIUM CHLORIDE 0.9 % (FLUSH) 0.9 %
10 SYRINGE (ML) INJECTION
Status: CANCELLED | OUTPATIENT
Start: 2024-11-04

## 2024-11-04 RX ORDER — QUETIAPINE FUMARATE 50 MG/1
25 TABLET, FILM COATED ORAL NIGHTLY PRN
Qty: 15 TABLET | Refills: 2 | Status: SHIPPED | OUTPATIENT
Start: 2024-11-04 | End: 2024-11-05 | Stop reason: SDUPTHER

## 2024-11-04 RX ORDER — BUDESONIDE 3 MG/1
3 CAPSULE, COATED PELLETS ORAL 3 TIMES DAILY
Qty: 60 CAPSULE | Refills: 2 | Status: SHIPPED | OUTPATIENT
Start: 2024-11-04 | End: 2024-11-05 | Stop reason: SDUPTHER

## 2024-11-04 RX ADMIN — Medication 10 ML: at 08:11

## 2024-11-04 RX ADMIN — HEPARIN 500 UNITS: 100 SYRINGE at 08:11

## 2024-11-04 NOTE — PROGRESS NOTES
BMT Pharmacist Medication Review Note     All current medications were reviewed with the patient and wife. The patient brought in all of his medications with him to this visit.      We discussed the changes made since last meeting:   - Stop scheduled ondansetron and now use just as needed.    - Stop taking trazodone and start quetiapine at night to help with sleep. Instructed wife to break tablet in half, and we can always titrate up if he requires more than 25mg.       The patient has ample supply of all medications except for acyclovir, posaconazole, and Bactrim (refills sent to [facility]). Patient and wife notified that no budesonide is currently in the pill box; appears [facility] did a partial fill, so I recommended they  other fill of budesonide and instructed wife to continue current dosing of 1 capsule three times a day.        Medicamentos/Medications Indicación/Indication Mañana/Morning Tarde/Afternoon Noche/Night   Acyclovir 800mg  Prevención de infecciones virales  Viral infection prevention 1 tableta  1 tableta   Letermovir (Prevymis®) 480mg Prevención de infección por CMV  CMV infection prevention 1 tableta     Posaconazole 100mg Prevencón de infecciones fungales  Fungal infection prevention 3 tabletas     Sulfamethoxazole-trimethoprim (Bactrim®) 800-160mg Fungal pneumonia prevention 1 tableta los lunes, miércoles y viernes (Mon., Wed., Fri)      **Tacrolimus 0.5mg Prevención de GVHD - NO tome la dosis de por la mañana en días que se relizará laboratorios  1 cápsula  1 cápsulas   Budesonide 3 mg  GI GVHD 1 cápsula 1 cápsula 1 cápsula   Prednisone 10mg GVHD 3 tabletas     Magnesium oxide 400mg Suplemento de magnesio 2 tabletas  2 tabletas      Pantoprazole (Protonix) 40 mg reflujo ácido   1 tableta     Carvedilol (Coreg®) 6.25 mg Presión arterial maeve  1 tableta  1 tableta   Gabapentin (Neurontin®) 600 mg Neuropatía 2 cápsulas  2 cápsulas   Lidocaine patch (Lidoderm®) 5% Dolor en los hombros 1  parcho en el hombro milagros y  1 parcho en el hombro derecho  Remueva luego de las 12 horas     **medicamento nuevo o cambios realizados al medicamento    MEDICAMENTOS CUANDO VI NECESARIOS/AS NEEDED MEDICATIONS:                                                                    Loperamida (Imodium®) 2 mg as needed for diarrhea                                                                                                              **Ondansetron 8mg up to three times a day as needed for Náuseas/Vómito  Prochlorperazine (Compazine®) 5mg cada 6 horas as needed for Náuseas/Vómito                                                           Tramadol (Ultram®) 50 mg cada 6 horas cuando sea necesario para el dolor         **Quetiapine (Seroquel) 50mg - ½ tableta as needed for sleep                                                                 HOLD UNTIL TOLD TO RESTART:  Cilostazol          All questions were answered.      Sanjuana Poe, Pharm.D., BCOP  Clinical Pharmacy Specialist, Bone Marrow Transplant/Cellular Therapy  [facility] SpectraLink: [phone]

## 2024-11-04 NOTE — PROGRESS NOTES
Section of Hematology and Stem Cell Transplantation    Post-Transplantation Follow Up Visit     11/4/24    Transplant History:   Primary oncologist: Paco Hickey MD  Primary oncologic diagnosis: MDS  Transplant date: 9/19/2024  Donor: haploidentical  Blood Type (Patient): B +  Blood Type (Donor): A +  CMV (Patient): Positive  CMV (Donor): Positive  Graft source: Bone marrow  CD34+ cell dose: 3.55x10^6  Conditioning Regimen: Fludarabine plus melphalan 100 mg/m2 + 2Gy TBI  GVHD prophylaxis: Post-transplant cyclophosphamide, Tacrolimus, MMF  Immediate post-transplant complications: Patient experienced expected GI toxicities with C diff negative diarrhea and nausea, neutropenic fever with negative infectious work up, expected cytopenias requiring transfusions, electrolyte abnormalities requiring replacement, volume overload requiring diuresis, intermittent SOB from pulmonary edema requiring 02, and a hemorrhoid flare up. Diarrhea and SOB improved towards the end of the hospital stay.     History of Present Ilness:   Guillaume Salinas (Guillaume) is a pleasant 69 y.o.male with a past medical history of MDS who is status post haploidentical stem cell transplantation conditioned with FluMel 100 + PTCy+ 2Gy TBI who is currently day +43 who presents for post-transplant follow up. He is feeling better today since we started him on steroids for his persistent nausea. He is no longer experiencing nausea and he denies vomiting or diarrhea. He is now sleeping and eating better. His wife states he is becoming more independent and walking around more. He denies any rashes or skin changes.     PAST MEDICAL HISTORY:   Past Medical History:   Diagnosis Date    Anticoagulant long-term use     Coronary artery disease     Hypertension     Myelodysplastic syndrome     Peripheral vascular disease, unspecified        PAST SURGICAL HISTORY:   Past Surgical History:   Procedure Laterality Date    BONE MARROW BIOPSY Left 4/26/2023     Procedure: Biopsy-bone marrow;  Surgeon: Harry Diamond MD;  Location: [facility] OR;  Service: Oncology;  Laterality: Left;    COLONOSCOPY N/A 2022    Procedure: COLONOSCOPY Golytely Vaccinated will bring cards;  Surgeon: Dereje Simon MD;  Location: [facility] ENDO;  Service: Endoscopy;  Laterality: N/A;  Do not cancel this order    INSERTION OF LEMONS CATHETER Right 2024    Procedure: INSERTION, CATHETER, CENTRAL VENOUS, LEMONS TRIPLE LUMEN;  Surgeon: Kg Patten MD;  Location: [facility] OR [facility] FLR;  Service: General;  Laterality: Right;       PAST SOCIAL HISTORY:  Social History     Tobacco Use    Smoking status: Former     Current packs/day: 0.00     Average packs/day: 0.3 packs/day for 50.0 years (12.5 ttl pk-yrs)     Types: Cigarettes     Start date: 3/1/1973     Quit date: 3/1/2023     Years since quittin.6     Passive exposure: Past    Smokeless tobacco: Never   Substance Use Topics    Alcohol use: Not Currently    Drug use: Never       FAMILY HISTORY:  Family History   Problem Relation Name Age of Onset    Hypertension Mother      Cancer Father      Cancer Brother 9     No Known Problems Daughter      No Known Problems Daughter      No Known Problems Son         CURRENT MEDICATIONS:   Current Outpatient Medications   Medication Sig    acyclovir (ZOVIRAX) 800 MG Tab Take 1 tablet (800 mg total) by mouth 2 (two) times daily.    budesonide (ENTOCORT EC) 3 mg capsule Take 1 capsule (3 mg total) by mouth 3 (three) times daily.    carvediloL (COREG) 6.25 MG tablet Take 1 tablet (6.25 mg total) by mouth 2 (two) times daily.    gabapentin (NEURONTIN) 300 MG capsule Take 2 capsules (600 mg total) by mouth 2 (two) times daily.    letermovir (PREVYMIS) 480 mg Tab Take 1 tablet (480 mg total) by mouth Daily.    LIDOcaine (LIDODERM) 5 % Place 1 patch onto the skin once daily. Remove & Discard patch within 12 hours or as directed by MD. Place patch to left shoulder.    loperamide (IMODIUM) 2 mg  capsule Take 1 capsule (2 mg total) by mouth 2 (two) times daily as needed for Diarrhea.    magnesium oxide (MAG-OX) 400 mg (241.3 mg magnesium) tablet Take 2 tablets (800 mg total) by mouth 2 (two) times daily.    ondansetron (ZOFRAN-ODT) 8 MG TbDL DISSOLVE 1 tablet (8 mg total) by mouth every 8 (eight) hours.    pantoprazole (PROTONIX) 40 MG tablet Take 1 tablet (40 mg total) by mouth once daily.    posaconazole (NOXAFIL) 100 mg TbEC tablet Take 3 tablets (300 mg total) by mouth once daily.    predniSONE (DELTASONE) 10 MG tablet Take 3 tablets (30 mg total) by mouth once daily.    prochlorperazine (COMPAZINE) 5 MG tablet Take 1 tablet (5 mg total) by mouth 4 (four) times daily as needed for Nausea.    sulfamethoxazole-trimethoprim 800-160mg (BACTRIM DS) 800-160 mg Tab Take 1 tablet by mouth every Mon, Wed, Fri. START 10/21/24    tacrolimus (PROGRAF) 0.5 MG Cap Take 1 capsule (0.5 mg total) by mouth every morning AND 1 capsule (0.5 mg total) every evening.    traMADoL (ULTRAM) 50 mg tablet Take 1 tablet (50 mg total) by mouth every 6 (six) hours as needed for Pain.    traZODone (DESYREL) 50 MG tablet Take 1 tablet (50 mg total) by mouth nightly as needed for Insomnia.     No current facility-administered medications for this visit.       ALLERGIES:   Review of patient's allergies indicates:  No Known Allergies    GVHD Review of Systems:     Pertinent positives and negatives included in the HPI. Otherwise a 14 point review of systems is negative. GVHD review of systems recorded in BMT flowsheet.     Physical Exam:     There were no vitals filed for this visit.    General: Appears well, NAD. Comes into clinic in wheelchair  HEENT: MMM, no OP lesions  Pulmonary: CTAB, no increased work of breathing, no W/R/C  Cardiovascular: S1S2 normal, RRR, no M/R/G  Abdominal: Soft, NT, ND, BS+, no HSM  Extremities: No C/C/E  Neurological: AAOx4, grossly normal, no focal deficits  Dermatologic: No appreciable rashes or  lesions    ECOG Performance Status: (foot note - ECOG PS provided by [employer]cology [employer]) 1 - Symptomatic but completely ambulatory    Karnofsky Performance Score:  80%- Normal Activity with Effort: Some Symptoms of Disease    Labs:   Lab Results   Component Value Date    WBC 4.74 10/31/2024    RBC 2.95 (L) 10/31/2024    HGB 9.3 (L) 10/31/2024    HCT 28.1 (L) 10/31/2024    MCV 95 10/31/2024    MCH 31.5 (H) 10/31/2024    MCHC 33.1 10/31/2024    RDW 19.4 (H) 10/31/2024    PLT 48 (L) 10/31/2024    MPV 12.6 10/31/2024    GRAN 78.0 (H) 10/31/2024    LYMPH 7.0 (L) 10/31/2024    MONO 13.0 10/31/2024    EOS Test Not Performed 10/21/2024    BASO Test Not Performed 10/21/2024    EOSINOPHIL 0.0 10/31/2024    BASOPHIL 0.0 10/31/2024       Sodium   Date Value Ref Range Status   10/31/2024 141 136 - 145 mmol/L Final     Potassium   Date Value Ref Range Status   10/31/2024 4.1 3.5 - 5.1 mmol/L Final     Chloride   Date Value Ref Range Status   10/31/2024 105 95 - 110 mmol/L Final     CO2   Date Value Ref Range Status   10/31/2024 28 23 - 29 mmol/L Final     Glucose   Date Value Ref Range Status   10/31/2024 107 70 - 110 mg/dL Final     BUN   Date Value Ref Range Status   10/31/2024 16 8 - 23 mg/dL Final     Creatinine   Date Value Ref Range Status   10/31/2024 0.9 0.5 - 1.4 mg/dL Final     Calcium   Date Value Ref Range Status   10/31/2024 8.9 8.7 - 10.5 mg/dL Final     Total Protein   Date Value Ref Range Status   10/31/2024 5.7 (L) 6.0 - 8.4 g/dL Final     Albumin   Date Value Ref Range Status   10/31/2024 3.5 3.5 - 5.2 g/dL Final     Total Bilirubin   Date Value Ref Range Status   10/31/2024 0.6 0.1 - 1.0 mg/dL Final     Comment:     For infants and newborns, interpretation of results should be based  on gestational age, weight and in agreement with clinical  observations.    Premature Infant recommended reference ranges:  Up to 24 hours.............<8.0 mg/dL  Up to 48 hours............<12.0 mg/dL  3-5  days..................<15.0 mg/dL  6-29 days.................<15.0 mg/dL       Alkaline Phosphatase   Date Value Ref Range Status   10/31/2024 83 40 - 150 U/L Final     AST   Date Value Ref Range Status   10/31/2024 53 (H) 10 - 40 U/L Final     ALT   Date Value Ref Range Status   10/31/2024 67 (H) 10 - 44 U/L Final     Anion Gap   Date Value Ref Range Status   10/31/2024 8 8 - 16 mmol/L Final     eGFR if    Date Value Ref Range Status   08/27/2021 >60 >60 mL/min/1.73 m^2 Final   08/27/2021 >60 >60 mL/min/1.73 m^2 Final   08/27/2021 >60 >60 mL/min/1.73 m^2 Final     eGFR if non    Date Value Ref Range Status   08/27/2021 57 (A) >60 mL/min/1.73 m^2 Final     Comment:     Calculation used to obtain the estimated glomerular filtration  rate (eGFR) is the CKD-EPI equation.      08/27/2021 57 (A) >60 mL/min/1.73 m^2 Final     Comment:     Calculation used to obtain the estimated glomerular filtration  rate (eGFR) is the CKD-EPI equation.      08/27/2021 57 (A) >60 mL/min/1.73 m^2 Final     Comment:     Calculation used to obtain the estimated glomerular filtration  rate (eGFR) is the CKD-EPI equation.          Imaging:   Hospital imaging reviewed.    Pathology:  Prior pathology reviewed. Plan for day +30 bone marrow biopsy on 10/23/24    Acute GVHD Scoring:  GVHD Acute Assessment     11/01/2024 Clinical concern for upper gut aGVHD. Started Budesonide as below.      Assessment and Plan:   Guillaume Salinas (Guillaume) is a pleasant 69 y.o.male with a past medical history of MDS s/p haplo SCT who presents for post-transplant follow up.    MDS: Status post treatment with azacitidine 75 mg/m2 daily x7 days plus venetoclax 100mg (voriconazole) daily x14 of 28 days.Venetoclax decreased to 7 days with cycle 3 until completion of 11 cycles. Primary oncologist is Paco Hickey MD who will be managing primary underlying disease.     Status post allogeneic stem cell transplantation: As noted  above, status post haploidentical stem cell transplantation conditioned with FluMel 100 + PTCy+ 2Gy TBI . Currently Day+ 43. Engrafted on 10/09/24 day +20.  Day 30 bmbx planned for 11/5 in clinic.     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 Persistent nausea despite anti-emetics, reducing pill burden. 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 He started budesonide 3mg TID on 10/28/24. Persistent nausea despite budesonide so started systemic steroids 11/1 at 0.5 mg/kg (currently 30 mg daily) and his steroid taper has been given to him.   Current tacro dose: 0.5 mg BID  Last tacro level: 9.3  Adjustments: no adjustments   Steroid tapering schedule is below  Date              Steroid Dose  11/04 - 11/10 Take 3 tablets (30 mg) by mouth once daily  11/11 - 11/17 Take 2 tablets (20 mg) by mouth once daily  11/18 - 11/24 Take 1 tablet (10 mg) by mouth once daily  11/25 - 12/01 Take ½ tablet (5 mg) by mouth once daily.  12/02 - 12/08 Take ½ tablet (5 mg) by mouth every other day   12/09 STOP       Immunosuppression: Prevention with posaconazole, acyclovir. CMV prophylaxis with letermovir. PJP prophyalxis Bactrim MW. Continue weekly monitoring of CMV and EBV.  Last CMV: Not-detected 11/4, last EBV: Not-detected 11/4.  Active infections: N/A    Pancytopenia: Due to underlying disease and chemotherapy. Transfuseu for Hgb <7 g/dL and platelets <10k.  -Received Filgrastim 300mcg/0.5 subcutaneously daily x 3 days    Hypomagnesemia: Related to tacro, poor po intake.   -Continue MagOx to 800mg twice daily.      Chemotherapy Induced Nausea: Improving. Will work to minimize pill burden - Discontinue scheduled compazine and adjust to PRN. Continue ondansetron 8mg q8hrs. Nausea was persisting so started budesonide. Continuing nausea after budesonide so starting systemic steroids at 0.5 mg/kg.     Anxiety, Restlessness: Noted since discharge by  family. Discontinuined scheduled zyprexa/compazine as this may be medication induced. Monitor for improvement end of week.    Insomnia: Patient still dealing with some insomnia although he is sleeping better overall. Will discontinue trazadone and begin Seroquel 25 mg PO nightly PRN    Follow up: Twice weekly follow up with labs.    Orders Placed:           No orders of the defined types were placed in this encounter.      Follow Up:      Twice weekly follow up as scheduled.     Gilma Ugalde PA-C  Malignant Hematology, Stem Cell Transplant, and Cellular Therapy  [facility]  [facility]

## 2024-11-05 ENCOUNTER — PROCEDURE VISIT (OUTPATIENT)
Dept: HEMATOLOGY/ONCOLOGY | Facility: CLINIC | Age: 69
End: 2024-11-05
Payer: MEDICARE

## 2024-11-05 VITALS
HEART RATE: 80 BPM | DIASTOLIC BLOOD PRESSURE: 63 MMHG | TEMPERATURE: 98 F | RESPIRATION RATE: 18 BRPM | SYSTOLIC BLOOD PRESSURE: 101 MMHG | OXYGEN SATURATION: 97 %

## 2024-11-05 DIAGNOSIS — D89.810 ACUTE GVHD: ICD-10-CM

## 2024-11-05 DIAGNOSIS — D46.9 MYELODYSPLASTIC SYNDROME: ICD-10-CM

## 2024-11-05 DIAGNOSIS — Z94.81 HISTORY OF ALLOGENEIC BONE MARROW TRANSPLANT: Primary | ICD-10-CM

## 2024-11-05 DIAGNOSIS — G47.00 INSOMNIA, UNSPECIFIED TYPE: ICD-10-CM

## 2024-11-05 LAB
REASON FOR REFERRAL (NARRATIVE): NORMAL
SPECIMEN SOURCE: NORMAL
SPECIMEN TYPE -CHIMERISM SORT: NORMAL

## 2024-11-05 PROCEDURE — 88185 FLOWCYTOMETRY/TC ADD-ON: CPT | Performed by: PATHOLOGY

## 2024-11-05 PROCEDURE — 81450 HL NEO GSAP 5-50DNA/DNA&RNA: CPT | Performed by: NURSE PRACTITIONER

## 2024-11-05 PROCEDURE — 88271 CYTOGENETICS DNA PROBE: CPT | Mod: 59 | Performed by: NURSE PRACTITIONER

## 2024-11-05 PROCEDURE — 38222 DX BONE MARROW BX & ASPIR: CPT | Mod: LT,S$GLB,, | Performed by: NURSE PRACTITIONER

## 2024-11-05 PROCEDURE — 88299 UNLISTED CYTOGENETIC STUDY: CPT | Performed by: NURSE PRACTITIONER

## 2024-11-05 PROCEDURE — 88313 SPECIAL STAINS GROUP 2: CPT | Performed by: PATHOLOGY

## 2024-11-05 PROCEDURE — 88184 FLOWCYTOMETRY/ TC 1 MARKER: CPT | Performed by: PATHOLOGY

## 2024-11-05 PROCEDURE — 88311 DECALCIFY TISSUE: CPT | Performed by: PATHOLOGY

## 2024-11-05 PROCEDURE — 88237 TISSUE CULTURE BONE MARROW: CPT | Performed by: NURSE PRACTITIONER

## 2024-11-05 PROCEDURE — 88305 TISSUE EXAM BY PATHOLOGIST: CPT | Performed by: PATHOLOGY

## 2024-11-05 RX ORDER — LIDOCAINE HYDROCHLORIDE 20 MG/ML
JELLY TOPICAL
Qty: 30 ML | Refills: 1 | Status: SHIPPED | OUTPATIENT
Start: 2024-11-05 | End: 2024-11-05 | Stop reason: CLARIF

## 2024-11-05 RX ORDER — LIDOCAINE HYDROCHLORIDE 20 MG/ML
8 INJECTION, SOLUTION INFILTRATION; PERINEURAL
Status: COMPLETED | OUTPATIENT
Start: 2024-11-05 | End: 2024-11-05

## 2024-11-05 RX ADMIN — LIDOCAINE HYDROCHLORIDE 8 ML: 20 INJECTION, SOLUTION INFILTRATION; PERINEURAL at 10:11

## 2024-11-05 NOTE — PROCEDURES
Bone marrow    Date/Time: 11/5/2024 10:25 AM    Performed by: Judy Anthony NP  Authorized by: Judy Anthony NP    Consent Done?: Yes (Written)   Immediately prior to procedure a time out was called to verify the correct patient, procedure, equipment, support staff and site/side marked as required. .      Position: prone  Aspiration?: Yes   Biopsy?: Yes        PROCEDURE NOTE:  Date of Procedure: 11/05/2024  Bone Marrow Biopsy and Aspiration  Indication: MDS day +47 Haplo Son BMT  Consent: Informed consent was obtained from patient.  Timeout: Done and documented. Allergies reviewed.  Position: prone  Site: Left posterior illiac crest.  Prep: Betadine.  Needle used: 11 gauge Jamshidi needle.  Anesthetic: 2% lidocaine 8 cc.  Biopsy: The biopsy needle was introduced into the marrow cavity and an aspirate was obtained without complications and sent for flow cytometry, AML fish, NGS, DNA/RNA hold, chimerisms and cytogenetics. Core biopsy obtained without difficulty and sent for routine histologic examination.  Complications: None.  Disposition: The patient was placed supine for 15min following procedure. MA to assess bandaid for bleeding prior to discharge home. Patient aware to keep bandaid dry and intact for 24hrs and to call clinic for any bleeding, fevers, pain, or signs of infection.  Blood loss: Minimal.     Judy Anthony NP  Hematology/Oncology/BMT

## 2024-11-05 NOTE — PROGRESS NOTES
Section of Hematology and Stem Cell Transplantation    Post-Transplantation Follow Up Visit     11/4/24    Transplant History:   Primary oncologist: Paco Hickey MD  Primary oncologic diagnosis: MDS  Transplant date: 9/19/2024  Donor: haploidentical  Blood Type (Patient): B +  Blood Type (Donor): A +  CMV (Patient): Positive  CMV (Donor): Positive  Graft source: Bone marrow  CD34+ cell dose: 3.55x10^6  Conditioning Regimen: Fludarabine plus melphalan 100 mg/m2 + 2Gy TBI  GVHD prophylaxis: Post-transplant cyclophosphamide, Tacrolimus, MMF  Immediate post-transplant complications: Patient experienced expected GI toxicities with C diff negative diarrhea and nausea, neutropenic fever with negative infectious work up, expected cytopenias requiring transfusions, electrolyte abnormalities requiring replacement, volume overload requiring diuresis, intermittent SOB from pulmonary edema requiring 02, and a hemorrhoid flare up. Diarrhea and SOB improved towards the end of the hospital stay.     History of Present Ilness:   Guillaume Salinas (Guillaume) is a pleasant 69 y.o.male with a past medical history of MDS who is status post haploidentical stem cell transplantation conditioned with FluMel 100 + PTCy+ 2Gy TBI who is currently day +49 who presents for post-transplant follow up. He is says he is feeling fine and that his nausea is doing a lot better. He is still having issues with sleeping. He only sleeps 2-3 hours and they are not continuous. He started the Seroquel yesterday for sleep but only took half a tablet. He denies any new rashes and diarrhea.     PAST MEDICAL HISTORY:   Past Medical History:   Diagnosis Date    Anticoagulant long-term use     Coronary artery disease     Hypertension     Myelodysplastic syndrome     Peripheral vascular disease, unspecified        PAST SURGICAL HISTORY:   Past Surgical History:   Procedure Laterality Date    BONE MARROW BIOPSY Left 4/26/2023    Procedure: Biopsy-bone  marrow;  Surgeon: Harry Diamond MD;  Location: [facility] OR;  Service: Oncology;  Laterality: Left;    COLONOSCOPY N/A 2022    Procedure: COLONOSCOPY Golytely Vaccinated will bring cards;  Surgeon: Dereje Simon MD;  Location: [facility] ENDO;  Service: Endoscopy;  Laterality: N/A;  Do not cancel this order    INSERTION OF LEMONS CATHETER Right 2024    Procedure: INSERTION, CATHETER, CENTRAL VENOUS, LEMONS TRIPLE LUMEN;  Surgeon: Kg Patten MD;  [address];  Service: General;  Laterality: Right;       PAST SOCIAL HISTORY:  Social History     Tobacco Use    Smoking status: Former     Current packs/day: 0.00     Average packs/day: 0.3 packs/day for 50.0 years (12.5 ttl pk-yrs)     Types: Cigarettes     Start date: 3/1/1973     Quit date: 3/1/2023     Years since quittin.6     Passive exposure: Past    Smokeless tobacco: Never   Substance Use Topics    Alcohol use: Not Currently    Drug use: Never       FAMILY HISTORY:  Family History   Problem Relation Name Age of Onset    Hypertension Mother      Cancer Father      Cancer Brother 9     No Known Problems Daughter      No Known Problems Daughter      No Known Problems Son         CURRENT MEDICATIONS:   Medication List with Changes/Refills   Current Medications    ACYCLOVIR (ZOVIRAX) 800 MG TAB    Take 1 tablet (800 mg total) by mouth 2 (two) times daily.    BUDESONIDE (ENTOCORT EC) 3 MG CAPSULE    Take 1 capsule (3 mg total) by mouth 3 (three) times daily.    CARVEDILOL (COREG) 6.25 MG TABLET    Take 1 tablet (6.25 mg total) by mouth 2 (two) times daily.    GABAPENTIN (NEURONTIN) 300 MG CAPSULE    Take 2 capsules (600 mg total) by mouth 2 (two) times daily.    LETERMOVIR (PREVYMIS) 480 MG TAB    Take 1 tablet (480 mg total) by mouth Daily.    LIDOCAINE (LIDODERM) 5 %    Place 1 patch onto the skin once daily. Remove & Discard patch within 12 hours or as directed by MD. Place patch to left shoulder.    LOPERAMIDE (IMODIUM) 2  MG CAPSULE    Take 1 capsule (2 mg total) by mouth 2 (two) times daily as needed for Diarrhea.    MAGNESIUM OXIDE (MAG-OX) 400 MG (241.3 MG MAGNESIUM) TABLET    Take 2 tablets (800 mg total) by mouth 2 (two) times daily.    ONDANSETRON (ZOFRAN-ODT) 8 MG TBDL    Take 1 tablet (8 mg total) by mouth every 8 (eight) hours as needed (nausea/vomiting).    PANTOPRAZOLE (PROTONIX) 40 MG TABLET    Take 1 tablet (40 mg total) by mouth once daily.    POSACONAZOLE (NOXAFIL) 100 MG TBEC TABLET    Take 3 tablets (300 mg total) by mouth once daily.    PREDNISONE (DELTASONE) 10 MG TABLET    Take 3 tablets (30 mg total) by mouth once daily.    PROCHLORPERAZINE (COMPAZINE) 5 MG TABLET    Take 1 tablet (5 mg total) by mouth 4 (four) times daily as needed for Nausea.    QUETIAPINE (SEROQUEL) 50 MG TABLET    Take 0.5 tablets (25 mg total) by mouth nightly as needed (Insomnia).    SULFAMETHOXAZOLE-TRIMETHOPRIM 800-160MG (BACTRIM DS) 800-160 MG TAB    Take 1 tablet by mouth every Mon, Wed, Fri. START 10/21/24    TACROLIMUS (PROGRAF) 0.5 MG CAP    Take 1 capsule (0.5 mg total) by mouth every morning AND 1 capsule (0.5 mg total) every evening.    TRAMADOL (ULTRAM) 50 MG TABLET    Take 1 tablet (50 mg total) by mouth every 6 (six) hours as needed for Pain.    TRAZODONE (DESYREL) 50 MG TABLET    Take 1 tablet (50 mg total) by mouth nightly as needed for Insomnia.           ALLERGIES:   Review of patient's allergies indicates:  No Known Allergies    GVHD Review of Systems:     Pertinent positives and negatives included in the HPI. Otherwise a 14 point review of systems is negative. GVHD review of systems recorded in BMT flowsheet.     Physical Exam:     Vitals:    11/07/24 0852   BP: (!) 98/55   Pulse: 77   Temp: 98.3 °F (36.8 °C)        General: Appears well, NAD. Comes into clinic in wheelchair  HEENT: MMM, no OP lesions  Pulmonary: CTAB, no increased work of breathing, no W/R/C  Cardiovascular: S1S2 normal, RRR, no M/R/G  Abdominal: Soft,  NT, ND, BS+, no HSM  Extremities: No C/C/E  Neurological: AAOx4, grossly normal, no focal deficits  Dermatologic: No appreciable rashes or lesions    ECOG Performance Status: (foot note - ECOG PS provided by [employer] Cooperative Oncology Group) 1 - Symptomatic but completely ambulatory    Karnofsky Performance Score:  80%- Normal Activity with Effort: Some Symptoms of Disease    Labs:   Lab Results   Component Value Date    WBC 5.64 11/04/2024    HGB 10.6 (L) 11/04/2024    HCT 32.8 (L) 11/04/2024    MCV 98 11/04/2024     (L) 11/04/2024        CMP  Sodium   Date Value Ref Range Status   11/04/2024 140 136 - 145 mmol/L Final     Potassium   Date Value Ref Range Status   11/04/2024 4.4 3.5 - 5.1 mmol/L Final     Chloride   Date Value Ref Range Status   11/04/2024 103 95 - 110 mmol/L Final     CO2   Date Value Ref Range Status   11/04/2024 30 (H) 23 - 29 mmol/L Final     Glucose   Date Value Ref Range Status   11/04/2024 108 70 - 110 mg/dL Final     BUN   Date Value Ref Range Status   11/04/2024 19 8 - 23 mg/dL Final     Creatinine   Date Value Ref Range Status   11/04/2024 0.8 0.5 - 1.4 mg/dL Final     Calcium   Date Value Ref Range Status   11/04/2024 9.3 8.7 - 10.5 mg/dL Final     Total Protein   Date Value Ref Range Status   11/04/2024 5.8 (L) 6.0 - 8.4 g/dL Final     Albumin   Date Value Ref Range Status   11/04/2024 3.6 3.5 - 5.2 g/dL Final     Total Bilirubin   Date Value Ref Range Status   11/04/2024 0.6 0.1 - 1.0 mg/dL Final     Comment:     For infants and newborns, interpretation of results should be based  on gestational age, weight and in agreement with clinical  observations.    Premature Infant recommended reference ranges:  Up to 24 hours.............<8.0 mg/dL  Up to 48 hours............<12.0 mg/dL  3-5 days..................<15.0 mg/dL  6-29 days.................<15.0 mg/dL       Alkaline Phosphatase   Date Value Ref Range Status   11/04/2024 92 40 - 150 U/L Final     AST   Date Value Ref Range  Status   11/04/2024 43 (H) 10 - 40 U/L Final     ALT   Date Value Ref Range Status   11/04/2024 83 (H) 10 - 44 U/L Final     Anion Gap   Date Value Ref Range Status   11/04/2024 7 (L) 8 - 16 mmol/L Final     eGFR   Date Value Ref Range Status   11/04/2024 >60.0 >60 mL/min/1.73 m^2 Final          Imaging:   Hospital imaging reviewed.    Pathology:  Prior pathology reviewed. Plan for day +30 bone marrow biopsy on 10/23/24    Acute GVHD Scoring:  GVHD Acute Assessment  Skin: No skin acute Wjyej-ynlqti-Iomx Disease/rash not attributable to acute Zktio-whkrso-Tuiu Disease  Upper Intestinal Tract: Stage 0 - No persistant nausea or vomiting (Nausea resolved)  Lower Intestinal Tract: No gut acute Eljyv-fdrvki-Iotg Disease/diarrhea not attributable to acute Dgyvq-hmrrui-Nvfb Disease  Liver: No liver acute Ctsrm-lxhjiy-Qsrm Disease/bilirubin level not attributable to acute Tiqqt-btwlih-Hhjy Disease     Overall Grade (Przepiorka): 0 - No stage 1-4 of any organ.      Assessment and Plan:   Guillaume Salinas (Guillaume) is a pleasant 69 y.o.male with a past medical history of MDS s/p haplo SCT who presents for post-transplant follow up.    MDS: Status post treatment with azacitidine 75 mg/m2 daily x7 days plus venetoclax 100mg (voriconazole) daily x14 of 28 days.Venetoclax decreased to 7 days with cycle 3 until completion of 11 cycles. Primary oncologist is Paco Hickey MD who will be managing primary underlying disease.     Status post allogeneic stem cell transplantation: As noted above, status post haploidentical stem cell transplantation conditioned with FluMel 100 + PTCy+ 2Gy TBI . Currently Day+ 43. Engrafted on 10/09/24 day +20.  Day 30 bone marrow (11/5/2024) showing mildly hypercellular marrow with no increased blast (0.3%) and no evidence of myeloid neoplasm. Pending chimerisms, NGS, and CG  Graft versus host disease: GVHD prophylaxis with Post-transplant cyclophosphamide, Tacrolimus, MMF (MMF d/c on D+35).  Persistent nausea despite anti-emetics, reducing pill burden. 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 He started budesonide 3mg TID on 10/28/24. Persistent nausea despite budesonide so started systemic steroids 11/1 at 0.5 mg/kg (currently 30 mg daily) and his steroid taper has been given to him.   Current tacro dose: 0.5 mg BID  Last tacro level: 8.8  Adjustments: no adjustments   Steroid tapering schedule is below  Date              Steroid Dose  11/04 - 11/10 Take 3 tablets (30 mg) by mouth once daily  11/11 - 11/17 Take 2 tablets (20 mg) by mouth once daily  11/18 - 11/24 Take 1 tablet (10 mg) by mouth once daily  11/25 - 12/01 Take ½ tablet (5 mg) by mouth once daily.  12/02 - 12/08 Take ½ tablet (5 mg) by mouth every other day   12/09 STOP       Immunosuppression: Prevention with posaconazole, acyclovir. CMV prophylaxis with letermovir. PJP prophyalxis Bactrim MW. Continue weekly monitoring of CMV and EBV.  Last CMV: Not-detected, last EBV: Not-detected.   Active infections: N/A    Pancytopenia: Due to underlying disease and chemotherapy. Transfuseu for Hgb <7 g/dL and platelets <10k.  -Received Filgrastim 300mcg/0.5 subcutaneously daily x 3 days    Hypomagnesemia: Related to tacro, poor po intake. Normal today.   -Continue MagOx to 800mg twice daily.      Chemotherapy Induced Nausea: Improving. Will work to minimize pill burden - Discontinue scheduled compazine and adjust to PRN. Continue ondansetron 8mg q8hrs. Nausea was persisting so started budesonide. Continuing nausea after budesonide so starting systemic steroids at 0.5 mg/kg.  -Nausea is doing much better. He is currently taking 30mg daily and will follow taper schedule      Anxiety, Restlessness: Noted since discharge by family. Discontinuined scheduled zyprexa/compazine as this may be medication induced. Taking Seroquel 25 mg nightly PRN for restlessness/insomnia    Insomnia: Patient still dealing with some insomnia. Explained to  patient the steroids may have some effect on him not sleeping well. He started the Seroquel 25 mg PO nightly PRN yesterday but only took half a tablet. Advised him to try to take the full tablet and see if this helps him sleep.     Follow up: Twice weekly follow up with labs.    Orders Placed:           No orders of the defined types were placed in this encounter.      Follow Up:      Twice weekly follow up as scheduled.     Gilma Uaglde PA-C  Malignant Hematology, Stem Cell Transplant, and Cellular Therapy  [facility]  [facility]

## 2024-11-06 ENCOUNTER — EXTERNAL HOME HEALTH (OUTPATIENT)
Dept: HOME HEALTH SERVICES | Facility: HOSPITAL | Age: 69
End: 2024-11-06
Payer: MEDICARE

## 2024-11-06 LAB
AML FISH REASON FOR REFERRAL (BM): NORMAL
ANNOTATION COMMENT IMP: NORMAL
BODY SITE - BONE MARROW: NORMAL
CELLS W CYTOGENETIC ABNL BLD/T: NORMAL
CHROM ANALY RESULT (ISCN): NORMAL
CLINICAL CYTOGENETICIST REVIEW: NORMAL
CLINICAL DIAGNOSIS - BONE MARROW: NORMAL
COMMENT: NORMAL
FINAL PATHOLOGIC DIAGNOSIS: NORMAL
FLOW CYTOMETRY ANTIBODIES ANALYZED - BONE MARROW: NORMAL
FLOW CYTOMETRY COMMENT - BONE MARROW: NORMAL
FLOW CYTOMETRY INTERPRETATION - BONE MARROW: NORMAL
GROSS: NORMAL
LAB TEST METHOD: NORMAL
Lab: NORMAL
MICROSCOPIC EXAM: NORMAL
MOL DX INTERP BLD/T QL: NORMAL
PROVIDER SIGNING NAME: NORMAL
SPECIMEN SOURCE: NORMAL
TEST PERFORMANCE INFO SPEC: NORMAL

## 2024-11-06 RX ORDER — QUETIAPINE FUMARATE 50 MG/1
25 TABLET, FILM COATED ORAL NIGHTLY PRN
Qty: 15 TABLET | Refills: 2 | Status: SHIPPED | OUTPATIENT
Start: 2024-11-06 | End: 2024-11-08

## 2024-11-06 RX ORDER — BUDESONIDE 3 MG/1
3 CAPSULE, COATED PELLETS ORAL 3 TIMES DAILY
Qty: 60 CAPSULE | Refills: 2 | Status: SHIPPED | OUTPATIENT
Start: 2024-11-06 | End: 2025-11-06

## 2024-11-06 NOTE — TELEPHONE ENCOUNTER
Please see the attached refill request. Were you looking to provide one time fill to see his response?

## 2024-11-07 ENCOUNTER — INFUSION (OUTPATIENT)
Dept: INFUSION THERAPY | Facility: HOSPITAL | Age: 69
End: 2024-11-07
Payer: MEDICARE

## 2024-11-07 ENCOUNTER — OFFICE VISIT (OUTPATIENT)
Dept: HEMATOLOGY/ONCOLOGY | Facility: CLINIC | Age: 69
End: 2024-11-07
Payer: MEDICARE

## 2024-11-07 VITALS
HEIGHT: 69 IN | OXYGEN SATURATION: 99 % | WEIGHT: 140.63 LBS | HEART RATE: 77 BPM | TEMPERATURE: 98 F | DIASTOLIC BLOOD PRESSURE: 55 MMHG | SYSTOLIC BLOOD PRESSURE: 98 MMHG | BODY MASS INDEX: 20.83 KG/M2

## 2024-11-07 DIAGNOSIS — Z76.82 STEM CELL TRANSPLANT CANDIDATE: ICD-10-CM

## 2024-11-07 DIAGNOSIS — E83.42 HYPOMAGNESEMIA: ICD-10-CM

## 2024-11-07 DIAGNOSIS — Z94.81 HISTORY OF ALLOGENEIC BONE MARROW TRANSPLANT: ICD-10-CM

## 2024-11-07 DIAGNOSIS — T45.1X5A CHEMOTHERAPY-INDUCED NAUSEA: ICD-10-CM

## 2024-11-07 DIAGNOSIS — D46.9 MYELODYSPLASTIC SYNDROME: Primary | ICD-10-CM

## 2024-11-07 DIAGNOSIS — D61.818 PANCYTOPENIA: ICD-10-CM

## 2024-11-07 DIAGNOSIS — F41.9 ANXIETY: ICD-10-CM

## 2024-11-07 DIAGNOSIS — D84.81 IMMUNODEFICIENCY SECONDARY TO NEOPLASM: ICD-10-CM

## 2024-11-07 DIAGNOSIS — R11.0 CHEMOTHERAPY-INDUCED NAUSEA: ICD-10-CM

## 2024-11-07 DIAGNOSIS — G47.00 INSOMNIA, UNSPECIFIED TYPE: ICD-10-CM

## 2024-11-07 DIAGNOSIS — D49.9 IMMUNODEFICIENCY SECONDARY TO NEOPLASM: ICD-10-CM

## 2024-11-07 LAB
ABO + RH BLD: NORMAL
ALBUMIN SERPL BCP-MCNC: 3.5 G/DL (ref 3.5–5.2)
ALP SERPL-CCNC: 102 U/L (ref 40–150)
ALT SERPL W/O P-5'-P-CCNC: 80 U/L (ref 10–44)
ANION GAP SERPL CALC-SCNC: 9 MMOL/L (ref 8–16)
AST SERPL-CCNC: 35 U/L (ref 10–40)
BASOPHILS # BLD AUTO: 0.01 K/UL (ref 0–0.2)
BASOPHILS NFR BLD: 0.2 % (ref 0–1.9)
BILIRUB SERPL-MCNC: 0.5 MG/DL (ref 0.1–1)
BLD GP AB SCN CELLS X3 SERPL QL: NORMAL
BUN SERPL-MCNC: 28 MG/DL (ref 8–23)
CALCIUM SERPL-MCNC: 8.9 MG/DL (ref 8.7–10.5)
CHLORIDE SERPL-SCNC: 105 MMOL/L (ref 95–110)
CMV DNA SPEC QL NAA+PROBE: NORMAL
CO2 SERPL-SCNC: 26 MMOL/L (ref 23–29)
CREAT SERPL-MCNC: 0.9 MG/DL (ref 0.5–1.4)
CYTOMEGALOVIRUS PCR, QUANT: NOT DETECTED IU/ML
DIFFERENTIAL METHOD BLD: ABNORMAL
DNA/RNA EXTRACT AND HOLD RESULT: NORMAL
DNA/RNA EXTRACTION: NORMAL
EOSINOPHIL # BLD AUTO: 0 K/UL (ref 0–0.5)
EOSINOPHIL NFR BLD: 0.7 % (ref 0–8)
EPSTEIN-BARR VIRUS DNA: NORMAL
EPSTEIN-BARR VIRUS PCR, QUANT: NOT DETECTED IU/ML
ERYTHROCYTE [DISTWIDTH] IN BLOOD BY AUTOMATED COUNT: 22.8 % (ref 11.5–14.5)
EST. GFR  (NO RACE VARIABLE): >60 ML/MIN/1.73 M^2
EXHR SPECIMEN TYPE: NORMAL
GLUCOSE SERPL-MCNC: 109 MG/DL (ref 70–110)
HCT VFR BLD AUTO: 33 % (ref 40–54)
HGB BLD-MCNC: 10.5 G/DL (ref 14–18)
IMM GRANULOCYTES # BLD AUTO: 0.03 K/UL (ref 0–0.04)
IMM GRANULOCYTES NFR BLD AUTO: 0.5 % (ref 0–0.5)
LYMPHOCYTES # BLD AUTO: 0.1 K/UL (ref 1–4.8)
LYMPHOCYTES NFR BLD: 2.2 % (ref 18–48)
MAGNESIUM SERPL-MCNC: 1.6 MG/DL (ref 1.6–2.6)
MCH RBC QN AUTO: 32.6 PG (ref 27–31)
MCHC RBC AUTO-ENTMCNC: 31.8 G/DL (ref 32–36)
MCV RBC AUTO: 103 FL (ref 82–98)
MONOCYTES # BLD AUTO: 0.4 K/UL (ref 0.3–1)
MONOCYTES NFR BLD: 7.7 % (ref 4–15)
NEUTROPHILS # BLD AUTO: 4.9 K/UL (ref 1.8–7.7)
NEUTROPHILS NFR BLD: 88.7 % (ref 38–73)
NRBC BLD-RTO: 0 /100 WBC
PHOSPHATE SERPL-MCNC: 3.8 MG/DL (ref 2.7–4.5)
PLATELET # BLD AUTO: 95 K/UL (ref 150–450)
PMV BLD AUTO: 11.8 FL (ref 9.2–12.9)
POTASSIUM SERPL-SCNC: 3.9 MMOL/L (ref 3.5–5.1)
PROT SERPL-MCNC: 5.5 G/DL (ref 6–8.4)
RBC # BLD AUTO: 3.22 M/UL (ref 4.6–6.2)
SODIUM SERPL-SCNC: 140 MMOL/L (ref 136–145)
SPECIMEN OUTDATE: NORMAL
TACROLIMUS BLD-MCNC: 8.8 NG/ML (ref 5–15)
WBC # BLD AUTO: 5.55 K/UL (ref 3.9–12.7)

## 2024-11-07 PROCEDURE — 84100 ASSAY OF PHOSPHORUS: CPT | Performed by: INTERNAL MEDICINE

## 2024-11-07 PROCEDURE — 36591 DRAW BLOOD OFF VENOUS DEVICE: CPT

## 2024-11-07 PROCEDURE — 83735 ASSAY OF MAGNESIUM: CPT | Performed by: INTERNAL MEDICINE

## 2024-11-07 PROCEDURE — 80053 COMPREHEN METABOLIC PANEL: CPT | Performed by: INTERNAL MEDICINE

## 2024-11-07 PROCEDURE — 86901 BLOOD TYPING SEROLOGIC RH(D): CPT | Performed by: INTERNAL MEDICINE

## 2024-11-07 PROCEDURE — 80197 ASSAY OF TACROLIMUS: CPT | Performed by: INTERNAL MEDICINE

## 2024-11-07 PROCEDURE — 85025 COMPLETE CBC W/AUTO DIFF WBC: CPT | Performed by: INTERNAL MEDICINE

## 2024-11-07 PROCEDURE — 99999 PR PBB SHADOW E&M-EST. PATIENT-LVL IV: CPT | Mod: PBBFAC,,, | Performed by: INTERNAL MEDICINE

## 2024-11-07 PROCEDURE — 87799 DETECT AGENT NOS DNA QUANT: CPT | Performed by: INTERNAL MEDICINE

## 2024-11-07 RX ORDER — SODIUM CHLORIDE 0.9 % (FLUSH) 0.9 %
10 SYRINGE (ML) INJECTION
Status: DISCONTINUED | OUTPATIENT
Start: 2024-11-07 | End: 2024-11-07 | Stop reason: HOSPADM

## 2024-11-07 RX ORDER — HEPARIN 100 UNIT/ML
500 SYRINGE INTRAVENOUS
OUTPATIENT
Start: 2024-11-07

## 2024-11-07 RX ORDER — HEPARIN 100 UNIT/ML
500 SYRINGE INTRAVENOUS
Status: DISCONTINUED | OUTPATIENT
Start: 2024-11-07 | End: 2024-11-07 | Stop reason: HOSPADM

## 2024-11-07 RX ORDER — SODIUM CHLORIDE 0.9 % (FLUSH) 0.9 %
10 SYRINGE (ML) INJECTION
OUTPATIENT
Start: 2024-11-07

## 2024-11-07 NOTE — NURSING
Labs drawn from [facility] without difficulty. Flushed with saline and heparin per protocol. Dressing changed per protocol. Tolerated well. D/C out of the clinic.

## 2024-11-08 ENCOUNTER — PATIENT MESSAGE (OUTPATIENT)
Dept: HEMATOLOGY/ONCOLOGY | Facility: CLINIC | Age: 69
End: 2024-11-08
Payer: MEDICARE

## 2024-11-08 DIAGNOSIS — G47.00 INSOMNIA, UNSPECIFIED TYPE: Primary | ICD-10-CM

## 2024-11-08 RX ORDER — ZOLPIDEM TARTRATE 5 MG/1
5 TABLET ORAL NIGHTLY PRN
Qty: 15 TABLET | Refills: 0 | Status: SHIPPED | OUTPATIENT
Start: 2024-11-08

## 2024-11-08 NOTE — PROGRESS NOTES
Section of Hematology and Stem Cell Transplantation    Post-Transplantation Follow Up Visit     11/4/24    Transplant History:   Primary oncologist: Paco Hickey MD  Primary oncologic diagnosis: MDS  Transplant date: 9/19/2024  Donor: haploidentical  Blood Type (Patient): B +  Blood Type (Donor): A +  CMV (Patient): Positive  CMV (Donor): Positive  Graft source: Bone marrow  CD34+ cell dose: 3.55x10^6  Conditioning Regimen: Fludarabine plus melphalan 100 mg/m2 + 2Gy TBI  GVHD prophylaxis: Post-transplant cyclophosphamide, Tacrolimus, MMF  Immediate post-transplant complications: Patient experienced expected GI toxicities with C diff negative diarrhea and nausea, neutropenic fever with negative infectious work up, expected cytopenias requiring transfusions, electrolyte abnormalities requiring replacement, volume overload requiring diuresis, intermittent SOB from pulmonary edema requiring 02, and a hemorrhoid flare up. Diarrhea and SOB improved towards the end of the hospital stay.     History of Present Ilness:   Guillaume Salinas (Guillaume) is a pleasant 69 y.o.male with a past medical history of MDS who is status post haploidentical stem cell transplantation conditioned with FluMel 100 + PTCy+ 2Gy TBI who is currently day +49 who presents for post-transplant follow up. He is says he is feeling fine and that his nausea is doing a lot better. He is still having issues with sleeping. He only sleeps 2-3 hours and they are not continuous. He started the Seroquel yesterday for sleep but only took half a tablet. He denies any new rashes and diarrhea.     PAST MEDICAL HISTORY:   Past Medical History:   Diagnosis Date    Anticoagulant long-term use     Coronary artery disease     Hypertension     Myelodysplastic syndrome     Peripheral vascular disease, unspecified        PAST SURGICAL HISTORY:   Past Surgical History:   Procedure Laterality Date    BONE MARROW BIOPSY Left 4/26/2023    Procedure: Biopsy-bone  marrow;  Surgeon: Harry Diamond MD;  Location: [facility] OR;  Service: Oncology;  Laterality: Left;    COLONOSCOPY N/A 2022    Procedure: COLONOSCOPY Golytely Vaccinated will bring cards;  Surgeon: Dereje Simon MD;  Location: [facility] ENDO;  Service: Endoscopy;  Laterality: N/A;  Do not cancel this order    INSERTION OF LEMONS CATHETER Right 2024    Procedure: INSERTION, CATHETER, CENTRAL VENOUS, LEMONS TRIPLE LUMEN;  Surgeon: Kg Patten MD;  [address];  Service: General;  Laterality: Right;       PAST SOCIAL HISTORY:  Social History     Tobacco Use    Smoking status: Former     Current packs/day: 0.00     Average packs/day: 0.3 packs/day for 50.0 years (12.5 ttl pk-yrs)     Types: Cigarettes     Start date: 3/1/1973     Quit date: 3/1/2023     Years since quittin.6     Passive exposure: Past    Smokeless tobacco: Never   Substance Use Topics    Alcohol use: Not Currently    Drug use: Never       FAMILY HISTORY:  Family History   Problem Relation Name Age of Onset    Hypertension Mother      Cancer Father      Cancer Brother 9     No Known Problems Daughter      No Known Problems Daughter      No Known Problems Son         CURRENT MEDICATIONS:   Medication List with Changes/Refills   Current Medications    ACYCLOVIR (ZOVIRAX) 800 MG TAB    Take 1 tablet (800 mg total) by mouth 2 (two) times daily.    BUDESONIDE (ENTOCORT EC) 3 MG CAPSULE    Take 1 capsule (3 mg total) by mouth 3 (three) times daily.    CARVEDILOL (COREG) 6.25 MG TABLET    Take 1 tablet (6.25 mg total) by mouth 2 (two) times daily.    GABAPENTIN (NEURONTIN) 300 MG CAPSULE    Take 2 capsules (600 mg total) by mouth 2 (two) times daily.    LETERMOVIR (PREVYMIS) 480 MG TAB    Take 1 tablet (480 mg total) by mouth Daily.    LIDOCAINE (LIDODERM) 5 %    Place 1 patch onto the skin once daily. Remove & Discard patch within 12 hours or as directed by MD. Place patch to left shoulder.    LOPERAMIDE (IMODIUM) 2  MG CAPSULE    Take 1 capsule (2 mg total) by mouth 2 (two) times daily as needed for Diarrhea.    MAGNESIUM OXIDE (MAG-OX) 400 MG (241.3 MG MAGNESIUM) TABLET    Take 2 tablets (800 mg total) by mouth 2 (two) times daily.    ONDANSETRON (ZOFRAN-ODT) 8 MG TBDL    Take 1 tablet (8 mg total) by mouth every 8 (eight) hours as needed (nausea/vomiting).    PANTOPRAZOLE (PROTONIX) 40 MG TABLET    Take 1 tablet (40 mg total) by mouth once daily.    POSACONAZOLE (NOXAFIL) 100 MG TBEC TABLET    Take 3 tablets (300 mg total) by mouth once daily.    PREDNISONE (DELTASONE) 10 MG TABLET    Take 3 tablets (30 mg total) by mouth once daily.    PROCHLORPERAZINE (COMPAZINE) 5 MG TABLET    Take 1 tablet (5 mg total) by mouth 4 (four) times daily as needed for Nausea.    QUETIAPINE (SEROQUEL) 50 MG TABLET    Take 0.5 tablets (25 mg total) by mouth nightly as needed (Insomnia).    SULFAMETHOXAZOLE-TRIMETHOPRIM 800-160MG (BACTRIM DS) 800-160 MG TAB    Take 1 tablet by mouth every Mon, Wed, Fri. START 10/21/24    TACROLIMUS (PROGRAF) 0.5 MG CAP    Take 1 capsule (0.5 mg total) by mouth every morning AND 1 capsule (0.5 mg total) every evening.    TRAMADOL (ULTRAM) 50 MG TABLET    Take 1 tablet (50 mg total) by mouth every 6 (six) hours as needed for Pain.    TRAZODONE (DESYREL) 50 MG TABLET    Take 1 tablet (50 mg total) by mouth nightly as needed for Insomnia.           ALLERGIES:   Review of patient's allergies indicates:  No Known Allergies    GVHD Review of Systems:     Pertinent positives and negatives included in the HPI. Otherwise a 14 point review of systems is negative. GVHD review of systems recorded in BMT flowsheet.     Physical Exam:     There were no vitals filed for this visit.       General: Appears well, NAD. Comes into clinic in wheelchair  HEENT: MMM, no OP lesions  Pulmonary: CTAB, no increased work of breathing, no W/R/C  Cardiovascular: S1S2 normal, RRR, no M/R/G  Abdominal: Soft, NT, ND, BS+, no HSM  Extremities: No  C/C/E  Neurological: AAOx4, grossly normal, no focal deficits  Dermatologic: No appreciable rashes or lesions    ECOG Performance Status: (foot note - ECOG PS provided by [employer]cology [employer]) 1 - Symptomatic but completely ambulatory    Karnofsky Performance Score:  80%- Normal Activity with Effort: Some Symptoms of Disease    Labs:   Lab Results   Component Value Date    WBC 5.55 11/07/2024    HGB 10.5 (L) 11/07/2024    HCT 33.0 (L) 11/07/2024     (H) 11/07/2024    PLT 95 (L) 11/07/2024        CMP  Sodium   Date Value Ref Range Status   11/07/2024 140 136 - 145 mmol/L Final     Potassium   Date Value Ref Range Status   11/07/2024 3.9 3.5 - 5.1 mmol/L Final     Chloride   Date Value Ref Range Status   11/07/2024 105 95 - 110 mmol/L Final     CO2   Date Value Ref Range Status   11/07/2024 26 23 - 29 mmol/L Final     Glucose   Date Value Ref Range Status   11/07/2024 109 70 - 110 mg/dL Final     BUN   Date Value Ref Range Status   11/07/2024 28 (H) 8 - 23 mg/dL Final     Creatinine   Date Value Ref Range Status   11/07/2024 0.9 0.5 - 1.4 mg/dL Final     Calcium   Date Value Ref Range Status   11/07/2024 8.9 8.7 - 10.5 mg/dL Final     Total Protein   Date Value Ref Range Status   11/07/2024 5.5 (L) 6.0 - 8.4 g/dL Final     Albumin   Date Value Ref Range Status   11/07/2024 3.5 3.5 - 5.2 g/dL Final     Total Bilirubin   Date Value Ref Range Status   11/07/2024 0.5 0.1 - 1.0 mg/dL Final     Comment:     For infants and newborns, interpretation of results should be based  on gestational age, weight and in agreement with clinical  observations.    Premature Infant recommended reference ranges:  Up to 24 hours.............<8.0 mg/dL  Up to 48 hours............<12.0 mg/dL  3-5 days..................<15.0 mg/dL  6-29 days.................<15.0 mg/dL       Alkaline Phosphatase   Date Value Ref Range Status   11/07/2024 102 40 - 150 U/L Final     AST   Date Value Ref Range Status   11/07/2024 35 10 - 40  U/L Final     ALT   Date Value Ref Range Status   11/07/2024 80 (H) 10 - 44 U/L Final     Anion Gap   Date Value Ref Range Status   11/07/2024 9 8 - 16 mmol/L Final     eGFR   Date Value Ref Range Status   11/07/2024 >60.0 >60 mL/min/1.73 m^2 Final          Imaging:   Hospital imaging reviewed.    Pathology:  Prior pathology reviewed. Plan for day +30 bone marrow biopsy on 10/23/24    Acute GVHD Scoring:  GVHD Acute Assessment                        Assessment and Plan:   Guillaume Salinas (Guillaume) is a pleasant 69 y.o.male with a past medical history of MDS s/p haplo SCT who presents for post-transplant follow up.    MDS: Status post treatment with azacitidine 75 mg/m2 daily x7 days plus venetoclax 100mg (voriconazole) daily x14 of 28 days.Venetoclax decreased to 7 days with cycle 3 until completion of 11 cycles. Primary oncologist is Paco Hickey MD who will be managing primary underlying disease.     Status post allogeneic stem cell transplantation: As noted above, status post haploidentical stem cell transplantation conditioned with FluMel 100 + PTCy+ 2Gy TBI . Currently Day+ 43. Engrafted on 10/09/24 day +20.  Day 30 bone marrow (11/5/2024) showing mildly hypercellular marrow with no increased blast (0.3%) and no evidence of myeloid neoplasm. Pending chimerisms, NGS, and CG  Graft versus host disease: GVHD prophylaxis with Post-transplant cyclophosphamide, Tacrolimus, MMF (MMF d/c on D+35). Persistent nausea despite anti-emetics, reducing pill burden. 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 He started budesonide 3mg TID on 10/28/24. Persistent nausea despite budesonide so started systemic steroids 11/1 at 0.5 mg/kg (currently 20 mg daily) and his steroid taper has been given to him.   Current tacro dose: 0.5 mg BID  Last tacro level: 8.8  Adjustments: no adjustments   Steroid tapering schedule is below  Date              Steroid Dose  11/04 - 11/10 Take 3 tablets (30 mg) by mouth once  daily  11/11 - 11/17 Take 2 tablets (20 mg) by mouth once daily  11/18 - 11/24 Take 1 tablet (10 mg) by mouth once daily  11/25 - 12/01 Take ½ tablet (5 mg) by mouth once daily.  12/02 - 12/08 Take ½ tablet (5 mg) by mouth every other day   12/09 STOP       Immunosuppression: Prevention with posaconazole, acyclovir. CMV prophylaxis with letermovir. PJP prophyalxis Bactrim MWF. Continue weekly monitoring of CMV and EBV.  Last CMV: Not-detected, last EBV: Not-detected.   Active infections: N/A    Pancytopenia: Due to underlying disease and chemotherapy. Transfuseu for Hgb <7 g/dL and platelets <10k.  -Received Filgrastim 300mcg/0.5 subcutaneously daily x 3 days    Hypomagnesemia: Related to tacro, poor po intake. Normal today.   -Continue MagOx to 800mg twice daily.      Chemotherapy Induced Nausea: Improving. Will work to minimize pill burden - Discontinue scheduled compazine and adjust to PRN. Continue ondansetron 8mg q8hrs. Nausea was persisting so started budesonide. Continuing nausea after budesonide so starting systemic steroids at 0.5 mg/kg.  -Nausea is doing much better. He is currently taking prednisone 20mg daily and will follow taper schedule      Anxiety, Restlessness: Noted since discharge by family. Discontinuined scheduled zyprexa/compazine as this may be medication induced. Taking Seroquel 50 mg nightly PRN for restlessness/insomnia    Insomnia: Patient still dealing with some insomnia. Explained to patient the steroids may have some effect on him not sleeping well. He started the Seroquel 50 mg PO nightly PRN.     Follow up: Twice weekly follow up with labs.    Orders Placed:           No orders of the defined types were placed in this encounter.      Follow Up:      Twice weekly follow up as scheduled.     Gilma Ugalde PA-C  Malignant Hematology, Stem Cell Transplant, and Cellular Therapy  [facility]

## 2024-11-11 ENCOUNTER — CLINICAL SUPPORT (OUTPATIENT)
Dept: HEMATOLOGY/ONCOLOGY | Facility: CLINIC | Age: 69
End: 2024-11-11
Payer: MEDICARE

## 2024-11-11 ENCOUNTER — OFFICE VISIT (OUTPATIENT)
Dept: HEMATOLOGY/ONCOLOGY | Facility: CLINIC | Age: 69
End: 2024-11-11
Payer: MEDICARE

## 2024-11-11 ENCOUNTER — INFUSION (OUTPATIENT)
Dept: INFUSION THERAPY | Facility: HOSPITAL | Age: 69
End: 2024-11-11
Payer: MEDICARE

## 2024-11-11 VITALS
WEIGHT: 136.88 LBS | TEMPERATURE: 98 F | DIASTOLIC BLOOD PRESSURE: 58 MMHG | BODY MASS INDEX: 20.27 KG/M2 | HEIGHT: 69 IN | OXYGEN SATURATION: 98 % | SYSTOLIC BLOOD PRESSURE: 107 MMHG | HEART RATE: 81 BPM

## 2024-11-11 DIAGNOSIS — Z94.84 IMMUNOCOMPROMISED STATE ASSOCIATED WITH STEM CELL TRANSPLANT: ICD-10-CM

## 2024-11-11 DIAGNOSIS — G25.81 RESTLESS LEG SYNDROME: ICD-10-CM

## 2024-11-11 DIAGNOSIS — D46.9 MYELODYSPLASTIC SYNDROME: Primary | ICD-10-CM

## 2024-11-11 DIAGNOSIS — G47.00 INSOMNIA, UNSPECIFIED TYPE: ICD-10-CM

## 2024-11-11 DIAGNOSIS — E83.42 HYPOMAGNESEMIA: ICD-10-CM

## 2024-11-11 DIAGNOSIS — M79.2 NEUROPATHIC PAIN: ICD-10-CM

## 2024-11-11 DIAGNOSIS — R11.0 CHEMOTHERAPY-INDUCED NAUSEA: ICD-10-CM

## 2024-11-11 DIAGNOSIS — D84.822 IMMUNOCOMPROMISED STATE ASSOCIATED WITH STEM CELL TRANSPLANT: ICD-10-CM

## 2024-11-11 DIAGNOSIS — D46.9 MYELODYSPLASTIC SYNDROME: ICD-10-CM

## 2024-11-11 DIAGNOSIS — T45.1X5A CHEMOTHERAPY-INDUCED NAUSEA: ICD-10-CM

## 2024-11-11 DIAGNOSIS — Z76.82 STEM CELL TRANSPLANT CANDIDATE: ICD-10-CM

## 2024-11-11 DIAGNOSIS — F41.9 ANXIETY: ICD-10-CM

## 2024-11-11 DIAGNOSIS — Z94.81 HISTORY OF ALLOGENEIC BONE MARROW TRANSPLANT: Primary | ICD-10-CM

## 2024-11-11 DIAGNOSIS — D53.9 NUTRITIONAL ANEMIA, UNSPECIFIED: ICD-10-CM

## 2024-11-11 DIAGNOSIS — D61.818 PANCYTOPENIA: ICD-10-CM

## 2024-11-11 LAB
ABO + RH BLD: NORMAL
ALBUMIN SERPL BCP-MCNC: 3.5 G/DL (ref 3.5–5.2)
ALP SERPL-CCNC: 96 U/L (ref 40–150)
ALT SERPL W/O P-5'-P-CCNC: 78 U/L (ref 10–44)
ANION GAP SERPL CALC-SCNC: 8 MMOL/L (ref 8–16)
AST SERPL-CCNC: 33 U/L (ref 10–40)
BASOPHILS # BLD AUTO: 0.01 K/UL (ref 0–0.2)
BASOPHILS NFR BLD: 0.2 % (ref 0–1.9)
BILIRUB SERPL-MCNC: 0.6 MG/DL (ref 0.1–1)
BLD GP AB SCN CELLS X3 SERPL QL: NORMAL
BUN SERPL-MCNC: 24 MG/DL (ref 8–23)
CALCIUM SERPL-MCNC: 9 MG/DL (ref 8.7–10.5)
CHLORIDE SERPL-SCNC: 105 MMOL/L (ref 95–110)
CO2 SERPL-SCNC: 28 MMOL/L (ref 23–29)
CREAT SERPL-MCNC: 0.8 MG/DL (ref 0.5–1.4)
DIFFERENTIAL METHOD BLD: ABNORMAL
EOSINOPHIL # BLD AUTO: 0.1 K/UL (ref 0–0.5)
EOSINOPHIL NFR BLD: 1.5 % (ref 0–8)
ERYTHROCYTE [DISTWIDTH] IN BLOOD BY AUTOMATED COUNT: 24.1 % (ref 11.5–14.5)
EST. GFR  (NO RACE VARIABLE): >60 ML/MIN/1.73 M^2
GLUCOSE SERPL-MCNC: 89 MG/DL (ref 70–110)
HCT VFR BLD AUTO: 34 % (ref 40–54)
HGB BLD-MCNC: 10.8 G/DL (ref 14–18)
IMM GRANULOCYTES # BLD AUTO: 0.05 K/UL (ref 0–0.04)
IMM GRANULOCYTES NFR BLD AUTO: 1 % (ref 0–0.5)
LYMPHOCYTES # BLD AUTO: 0.1 K/UL (ref 1–4.8)
LYMPHOCYTES NFR BLD: 2.9 % (ref 18–48)
MAGNESIUM SERPL-MCNC: 1.8 MG/DL (ref 1.6–2.6)
MCH RBC QN AUTO: 33.2 PG (ref 27–31)
MCHC RBC AUTO-ENTMCNC: 31.8 G/DL (ref 32–36)
MCV RBC AUTO: 105 FL (ref 82–98)
MONOCYTES # BLD AUTO: 0.7 K/UL (ref 0.3–1)
MONOCYTES NFR BLD: 14.9 % (ref 4–15)
NEUTROPHILS # BLD AUTO: 3.8 K/UL (ref 1.8–7.7)
NEUTROPHILS NFR BLD: 79.5 % (ref 38–73)
NRBC BLD-RTO: 0 /100 WBC
PHOSPHATE SERPL-MCNC: 3.5 MG/DL (ref 2.7–4.5)
PLATELET # BLD AUTO: 76 K/UL (ref 150–450)
PMV BLD AUTO: 11.6 FL (ref 9.2–12.9)
POTASSIUM SERPL-SCNC: 3.8 MMOL/L (ref 3.5–5.1)
PROT SERPL-MCNC: 5.5 G/DL (ref 6–8.4)
RBC # BLD AUTO: 3.25 M/UL (ref 4.6–6.2)
SODIUM SERPL-SCNC: 141 MMOL/L (ref 136–145)
SPECIMEN OUTDATE: NORMAL
TACROLIMUS BLD-MCNC: 7.6 NG/ML (ref 5–15)
WBC # BLD AUTO: 4.82 K/UL (ref 3.9–12.7)

## 2024-11-11 PROCEDURE — 85025 COMPLETE CBC W/AUTO DIFF WBC: CPT | Performed by: INTERNAL MEDICINE

## 2024-11-11 PROCEDURE — 84100 ASSAY OF PHOSPHORUS: CPT | Performed by: INTERNAL MEDICINE

## 2024-11-11 PROCEDURE — 99999 PR PBB SHADOW E&M-EST. PATIENT-LVL II: CPT | Mod: PBBFAC,,,

## 2024-11-11 PROCEDURE — 87799 DETECT AGENT NOS DNA QUANT: CPT | Performed by: INTERNAL MEDICINE

## 2024-11-11 PROCEDURE — 80197 ASSAY OF TACROLIMUS: CPT | Performed by: INTERNAL MEDICINE

## 2024-11-11 PROCEDURE — 86850 RBC ANTIBODY SCREEN: CPT | Performed by: INTERNAL MEDICINE

## 2024-11-11 PROCEDURE — 99999 PR PBB SHADOW E&M-EST. PATIENT-LVL IV: CPT | Mod: PBBFAC,,, | Performed by: INTERNAL MEDICINE

## 2024-11-11 PROCEDURE — 80053 COMPREHEN METABOLIC PANEL: CPT | Performed by: INTERNAL MEDICINE

## 2024-11-11 PROCEDURE — 36592 COLLECT BLOOD FROM PICC: CPT

## 2024-11-11 PROCEDURE — 83735 ASSAY OF MAGNESIUM: CPT | Performed by: INTERNAL MEDICINE

## 2024-11-11 RX ORDER — HEPARIN 100 UNIT/ML
500 SYRINGE INTRAVENOUS
Status: DISCONTINUED | OUTPATIENT
Start: 2024-11-11 | End: 2024-11-11 | Stop reason: HOSPADM

## 2024-11-11 RX ORDER — GABAPENTIN 300 MG/1
600 CAPSULE ORAL NIGHTLY
Start: 2024-11-11

## 2024-11-11 RX ORDER — SODIUM CHLORIDE 0.9 % (FLUSH) 0.9 %
10 SYRINGE (ML) INJECTION
Status: DISCONTINUED | OUTPATIENT
Start: 2024-11-11 | End: 2024-11-11 | Stop reason: HOSPADM

## 2024-11-11 RX ORDER — SODIUM CHLORIDE 0.9 % (FLUSH) 0.9 %
10 SYRINGE (ML) INJECTION
Status: CANCELLED | OUTPATIENT
Start: 2024-11-11

## 2024-11-11 RX ORDER — ROPINIROLE 0.25 MG/1
0.25 TABLET, FILM COATED ORAL NIGHTLY
Qty: 30 TABLET | Refills: 11 | Status: SHIPPED | OUTPATIENT
Start: 2024-11-11 | End: 2024-11-14

## 2024-11-11 RX ORDER — POSACONAZOLE 100 MG/1
300 TABLET, DELAYED RELEASE ORAL DAILY
Qty: 90 TABLET | Refills: 11 | Status: SHIPPED | OUTPATIENT
Start: 2024-11-11 | End: 2024-11-11 | Stop reason: SDUPTHER

## 2024-11-11 RX ORDER — HEPARIN 100 UNIT/ML
500 SYRINGE INTRAVENOUS
Status: CANCELLED | OUTPATIENT
Start: 2024-11-11

## 2024-11-11 RX ORDER — POSACONAZOLE 100 MG/1
300 TABLET, DELAYED RELEASE ORAL DAILY
Qty: 90 TABLET | Refills: 11 | Status: SHIPPED | OUTPATIENT
Start: 2024-11-11

## 2024-11-11 NOTE — NURSING
06/06/19 1620   Group Therapy Session   Group Attendance attended group session   Total Time (minutes) 35   Group Type psychotherapeutic   Group Topic Covered   (self-reflection)   Patient Participation/Contribution cooperative with task;discussed personal experience with topic;listened actively   Patient participated in psychotherapy group which included a mindfulness activity focusing on the 5 senses and then processing as a group. Nicko actively participated in group and openly shared responses during group discussion. His mood was pleasant, affect congruent. Thought process logical and linear.       Labs drawn from [facility].  Pt tolerated.

## 2024-11-11 NOTE — PROGRESS NOTES
Section of Hematology and Stem Cell Transplantation    Post-Transplantation Follow Up Visit     11/4/24    Transplant History:   Primary oncologist: Paco Hickey MD  Primary oncologic diagnosis: MDS  Transplant date: 9/19/2024  Donor: haploidentical  Blood Type (Patient): B +  Blood Type (Donor): A +  CMV (Patient): Positive  CMV (Donor): Positive  Graft source: Bone marrow  CD34+ cell dose: 3.55x10^6  Conditioning Regimen: Fludarabine plus melphalan 100 mg/m2 + 2Gy TBI  GVHD prophylaxis: Post-transplant cyclophosphamide, Tacrolimus, MMF  Immediate post-transplant complications: Patient experienced expected GI toxicities with C diff negative diarrhea and nausea, neutropenic fever with negative infectious work up, expected cytopenias requiring transfusions, electrolyte abnormalities requiring replacement, volume overload requiring diuresis, intermittent SOB from pulmonary edema requiring 02, and a hemorrhoid flare up. Diarrhea and SOB improved towards the end of the hospital stay.     History of Present Ilness:   Guillaume Salinas (Guillaume) is a pleasant 69 y.o.male with a past medical history of MDS who is status post haploidentical stem cell transplantation conditioned with FluMel 100 + PTCy+ 2Gy TBI who is currently day +53  who presents for post-transplant follow up. He is says he is feeling fine and that his nausea is doing a lot better and has not had any episodes of emesis. He is having about 2-3 loose but not watery stools a day. He is still having issues with sleeping, falling asleep and staying asleep. His seroquel was recently increased but he does not think that is helping. He was prescribed Ambien on Friday but has not started taking it. He states that he has restless legs which are causing his sleep issues, denies any racing thoughts or anxiety.     PAST MEDICAL HISTORY:   Past Medical History:   Diagnosis Date    Anticoagulant long-term use     Coronary artery disease     Hypertension      Myelodysplastic syndrome     Peripheral vascular disease, unspecified        PAST SURGICAL HISTORY:   Past Surgical History:   Procedure Laterality Date    BONE MARROW BIOPSY Left 2023    Procedure: Biopsy-bone marrow;  Surgeon: Harry Diamond MD;  Location: [facility] OR;  Service: Oncology;  Laterality: Left;    COLONOSCOPY N/A 2022    Procedure: COLONOSCOPY Golytely Vaccinated will bring cards;  Surgeon: Dereje Simon MD;  Location: [facility] ENDO;  Service: Endoscopy;  Laterality: N/A;  Do not cancel this order    INSERTION OF LEMONS CATHETER Right 2024    Procedure: INSERTION, CATHETER, CENTRAL VENOUS, LEMONS TRIPLE LUMEN;  Surgeon: Kg Patten MD;  Location: [facility] OR [address] [place] [address];  Service: General;  Laterality: Right;       PAST SOCIAL HISTORY:  Social History     Tobacco Use    Smoking status: Former     Current packs/day: 0.00     Average packs/day: 0.3 packs/day for 50.0 years (12.5 ttl pk-yrs)     Types: Cigarettes     Start date: 3/1/1973     Quit date: 3/1/2023     Years since quittin.6     Passive exposure: Past    Smokeless tobacco: Never   Substance Use Topics    Alcohol use: Not Currently    Drug use: Never       FAMILY HISTORY:  Family History   Problem Relation Name Age of Onset    Hypertension Mother      Cancer Father      Cancer Brother 9     No Known Problems Daughter      No Known Problems Daughter      No Known Problems Son         CURRENT MEDICATIONS:   Medication List with Changes/Refills   Current Medications    ACYCLOVIR (ZOVIRAX) 800 MG TAB    Take 1 tablet (800 mg total) by mouth 2 (two) times daily.    BUDESONIDE (ENTOCORT EC) 3 MG CAPSULE    Take 1 capsule (3 mg total) by mouth 3 (three) times daily.    CARVEDILOL (COREG) 6.25 MG TABLET    Take 1 tablet (6.25 mg total) by mouth 2 (two) times daily.    GABAPENTIN (NEURONTIN) 300 MG CAPSULE    Take 2 capsules (600 mg total) by mouth 2 (two) times daily.    LETERMOVIR (PREVYMIS) 480 MG TAB    Take 1 tablet  (480 mg total) by mouth Daily.    LIDOCAINE (LIDODERM) 5 %    Place 1 patch onto the skin once daily. Remove & Discard patch within 12 hours or as directed by MD. Place patch to left shoulder.    LOPERAMIDE (IMODIUM) 2 MG CAPSULE    Take 1 capsule (2 mg total) by mouth 2 (two) times daily as needed for Diarrhea.    MAGNESIUM OXIDE (MAG-OX) 400 MG (241.3 MG MAGNESIUM) TABLET    Take 2 tablets (800 mg total) by mouth 2 (two) times daily.    ONDANSETRON (ZOFRAN-ODT) 8 MG TBDL    Take 1 tablet (8 mg total) by mouth every 8 (eight) hours as needed (nausea/vomiting).    PANTOPRAZOLE (PROTONIX) 40 MG TABLET    Take 1 tablet (40 mg total) by mouth once daily.    POSACONAZOLE (NOXAFIL) 100 MG TBEC TABLET    Take 3 tablets (300 mg total) by mouth once daily.    PREDNISONE (DELTASONE) 10 MG TABLET    Take 3 tablets (30 mg total) by mouth once daily.    PROCHLORPERAZINE (COMPAZINE) 5 MG TABLET    Take 1 tablet (5 mg total) by mouth 4 (four) times daily as needed for Nausea.    SULFAMETHOXAZOLE-TRIMETHOPRIM 800-160MG (BACTRIM DS) 800-160 MG TAB    Take 1 tablet by mouth every Mon, Wed, Fri. START 10/21/24    TACROLIMUS (PROGRAF) 0.5 MG CAP    Take 1 capsule (0.5 mg total) by mouth every morning AND 1 capsule (0.5 mg total) every evening.    TRAMADOL (ULTRAM) 50 MG TABLET    Take 1 tablet (50 mg total) by mouth every 6 (six) hours as needed for Pain.    ZOLPIDEM (AMBIEN) 5 MG TAB    Take 1 tablet (5 mg total) by mouth nightly as needed (insomnia).           ALLERGIES:   Review of patient's allergies indicates:  No Known Allergies    GVHD Review of Systems:     Pertinent positives and negatives included in the HPI. Otherwise a 14 point review of systems is negative. GVHD review of systems recorded in BMT flowsheet.     Physical Exam:     Vitals:    11/11/24 0842   BP: (!) 107/58   Pulse: 81   Temp: 98.4 °F (36.9 °C)          General: Appears well, NAD. Comes into clinic in wheelchair  HEENT: MMM, no OP lesions  Pulmonary: CTAB, no  increased work of breathing, no W/R/C  Cardiovascular: S1S2 normal, RRR, no M/R/G  Abdominal: Soft, NT, ND, BS+, no HSM  Extremities: No C/C/E  Neurological: AAOx4, grossly normal, no focal deficits  Dermatologic: No appreciable rashes or lesions    ECOG Performance Status: (foot note - ECOG PS provided by Eastern Cooperative Oncology Group) 1 - Symptomatic but completely ambulatory    Karnofsky Performance Score:  80%- Normal Activity with Effort: Some Symptoms of Disease    Labs:   Lab Results   Component Value Date    WBC 5.55 11/07/2024    HGB 10.5 (L) 11/07/2024    HCT 33.0 (L) 11/07/2024     (H) 11/07/2024    PLT 95 (L) 11/07/2024        CMP  Sodium   Date Value Ref Range Status   11/07/2024 140 136 - 145 mmol/L Final     Potassium   Date Value Ref Range Status   11/07/2024 3.9 3.5 - 5.1 mmol/L Final     Chloride   Date Value Ref Range Status   11/07/2024 105 95 - 110 mmol/L Final     CO2   Date Value Ref Range Status   11/07/2024 26 23 - 29 mmol/L Final     Glucose   Date Value Ref Range Status   11/07/2024 109 70 - 110 mg/dL Final     BUN   Date Value Ref Range Status   11/07/2024 28 (H) 8 - 23 mg/dL Final     Creatinine   Date Value Ref Range Status   11/07/2024 0.9 0.5 - 1.4 mg/dL Final     Calcium   Date Value Ref Range Status   11/07/2024 8.9 8.7 - 10.5 mg/dL Final     Total Protein   Date Value Ref Range Status   11/07/2024 5.5 (L) 6.0 - 8.4 g/dL Final     Albumin   Date Value Ref Range Status   11/07/2024 3.5 3.5 - 5.2 g/dL Final     Total Bilirubin   Date Value Ref Range Status   11/07/2024 0.5 0.1 - 1.0 mg/dL Final     Comment:     For infants and newborns, interpretation of results should be based  on gestational age, weight and in agreement with clinical  observations.    Premature Infant recommended reference ranges:  Up to 24 hours.............<8.0 mg/dL  Up to 48 hours............<12.0 mg/dL  3-5 days..................<15.0 mg/dL  6-29 days.................<15.0 mg/dL       Alkaline  Phosphatase   Date Value Ref Range Status   11/07/2024 102 40 - 150 U/L Final     AST   Date Value Ref Range Status   11/07/2024 35 10 - 40 U/L Final     ALT   Date Value Ref Range Status   11/07/2024 80 (H) 10 - 44 U/L Final     Anion Gap   Date Value Ref Range Status   11/07/2024 9 8 - 16 mmol/L Final     eGFR   Date Value Ref Range Status   11/07/2024 >60.0 >60 mL/min/1.73 m^2 Final          Imaging:   Hospital imaging reviewed.    Pathology:  Prior pathology reviewed. Plan for day +30 bone marrow biopsy on 10/23/24    Acute GVHD Scoring:  GVHD Acute Assessment                        Assessment and Plan:   Guillaume Salinas (Guillaume) is a pleasant 69 y.o.male with a past medical history of MDS s/p haplo SCT who presents for post-transplant follow up.    MDS: Status post treatment with azacitidine 75 mg/m2 daily x7 days plus venetoclax 100mg (voriconazole) daily x14 of 28 days.Venetoclax decreased to 7 days with cycle 3 until completion of 11 cycles. Primary oncologist is Paco Hickey MD who will be managing primary underlying disease.     Status post allogeneic stem cell transplantation: As noted above, status post haploidentical stem cell transplantation conditioned with FluMel 100 + PTCy+ 2Gy TBI . Currently Day+ 53. Engrafted on 10/09/24 day +20.  Day 30 bone marrow (11/5/2024) showing mildly hypercellular marrow with no increased blast (0.3%) and no evidence of myeloid neoplasm. Pending chimerisms, NGS, and CG  Graft versus host disease: GVHD prophylaxis with Post-transplant cyclophosphamide, Tacrolimus, MMF (MMF d/c on D+35). Persistent nausea despite anti-emetics, reducing pill burden. 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 He started budesonide 3mg TID on 10/28/24. Persistent nausea despite budesonide so started systemic steroids 11/1 at 0.5 mg/kg (currently 20 mg daily) and his steroid taper has been given to him.   Current tacro dose: 0.5 mg BID  Last tacro level:  8.8  Adjustments: no adjustments   Steroid tapering schedule is below  Date              Steroid Dose  11/04 - 11/10 Take 3 tablets (30 mg) by mouth once daily  11/11 - 11/17 Take 2 tablets (20 mg) by mouth once daily  11/18 - 11/24 Take 1 tablet (10 mg) by mouth once daily  11/25 - 12/01 Take ½ tablet (5 mg) by mouth once daily.  12/02 - 12/08 Take ½ tablet (5 mg) by mouth every other day   12/09 STOP       Immunosuppression: Prevention with posaconazole, acyclovir. CMV prophylaxis with letermovir. PJP prophyalxis Bactrim MWF. Continue weekly monitoring of CMV and EBV.  Last CMV: Not-detected, last EBV: Not-detected.   Active infections: N/A    Pancytopenia: Due to underlying disease and chemotherapy. Transfuseu for Hgb <7 g/dL and platelets <10k.  -Received Filgrastim 300mcg/0.5 subcutaneously daily x 3 days  - Will order nutritional workup for next labs     Hypomagnesemia: Related to tacro, poor po intake. Normal today.   -Continue MagOx to 800mg twice daily.      Chemotherapy Induced Nausea: Improving. Will work to minimize pill burden - Discontinue scheduled compazine and adjust to PRN. Continue ondansetron 8mg q8hrs. Nausea was persisting so started budesonide. Continuing nausea after budesonide so starting systemic steroids at 0.5 mg/kg.  -Nausea is doing much better. He is currently taking prednisone 20mg daily and will follow taper schedule.     Anxiety, Restless Leg Syndrome: Noted since discharge by family. Discontinuined scheduled zyprexa/compazine as this may be medication induced. Will stop Seroquel and have him start the Ambien. Will prescribe ropinirole for RLS and increase as needed.     Insomnia: Patient still dealing with some insomnia. Explained to patient the steroids may have some effect on him not sleeping well. Stopping Seroquel and starting Ambien. Ropinirole started for RLS.     Follow up: Twice weekly follow up with labs.    Orders Placed:           No orders of the defined types were  placed in this encounter.      Follow Up:      Twice weekly follow up as scheduled.     Lawson Currie MD  Hematology/Oncology Fellow PGY-V

## 2024-11-11 NOTE — PROGRESS NOTES
BMT Pharmacist Medication Review Note     All current medications were reviewed with the patient and his caregiver. The patient brought in all of his medications with him to this visit.      We discussed the changes made since last meeting:   - Gabapentin dose was changed from twice daily to at bedtime only.    - Ropinirole was added to help with restless leg.     The patient has ample supply of all medications except for acyclovir and posaconazole. Called [facility] and requested they refill these medications on the patient's behalf.     Medicamentos/Medications Indicación/Indication Mañana/Morning Tarde/Afternoon Noche/Night   Acyclovir 800mg  Prevención de infecciones virales  Viral infection prevention 1 tableta  1 tableta   Letermovir (Prevymis®) 480mg Prevención de infección por CMV  CMV infection prevention 1 tableta     Posaconazole 100mg Prevencón de infecciones fungales  Fungal infection prevention 3 tabletas     Sulfamethoxazole-trimethoprim (Bactrim®) 800-160mg Fungal pneumonia prevention 1 tableta los lunes, miércoles y viernes (Mon., Wed., Fri)      **Tacrolimus 0.5mg Prevención de GVHD - NO tome la dosis de por la mañana en días que se relizará laboratorios  1 cápsula  1 cápsulas   Budesonide 3 mg  GI GVHD 1 cápsula 1 cápsula 1 cápsula   Prednisone 10mg GVHD 11/11 - 11/17: 2 tabletas ivette vez al día  11/18 - 11/24: 1 tableta ivette vez al día  11/25 - 12/01: ½ tableta ivette vez al día  12/02 - 12/08: ½ tableta día por medio  12/09: Detener   Magnesium oxide 400mg Suplemento de magnesio 2 tabletas  2 tabletas      Pantoprazole (Protonix) 40 mg reflujo ácido   1 tableta     Carvedilol (Coreg®) 6.25 mg Presión arterial maeve  1 tableta  1 tableta   Gabapentin (Neurontin®) 600 mg Neuropatía   2 cápsulas   Lidocaine patch (Lidoderm®) 5% Dolor en los hombros 1 parcho en el hombro milagros y  1 parcho en el hombro derecho  Remueva luego de las 12 horas     **medicamento nuevo o cambios realizados al  medicamento  MEDICAMENTOS CUANDO VI NECESARIOS/AS NEEDED MEDICATIONS:                                                                    Loperamida (Imodium®) 2 mg dos veces al día según sea necesario para la diarrea  Ondansetron 8mg hasta sosa veces al día según sea necesario para Náuseas/Vómito  Prochlorperazine (Compazine®) 5mg cada 6 horas as needed for Náuseas/Vómito                                                           Tramadol (Ultram®) 50 mg cada 6 horas cuando sea necesario para el dolor         **Zolpidem (Ambien) 5 mg tableta todas las noches según sea necesario para dormir     HOLD UNTIL TOLD TO RESTART:  Cilostazol        All questions were answered.      Halima Thomas, PharmD  Clinical Pharmacist-BMT/Hematology  [facility]

## 2024-11-14 ENCOUNTER — INFUSION (OUTPATIENT)
Dept: INFUSION THERAPY | Facility: HOSPITAL | Age: 69
End: 2024-11-14
Payer: MEDICARE

## 2024-11-14 ENCOUNTER — OFFICE VISIT (OUTPATIENT)
Dept: HEMATOLOGY/ONCOLOGY | Facility: CLINIC | Age: 69
End: 2024-11-14
Payer: MEDICARE

## 2024-11-14 ENCOUNTER — DOCUMENTATION ONLY (OUTPATIENT)
Dept: HEMATOLOGY/ONCOLOGY | Facility: CLINIC | Age: 69
End: 2024-11-14
Payer: MEDICARE

## 2024-11-14 VITALS
OXYGEN SATURATION: 99 % | HEIGHT: 69 IN | HEART RATE: 74 BPM | SYSTOLIC BLOOD PRESSURE: 111 MMHG | WEIGHT: 136.13 LBS | TEMPERATURE: 98 F | RESPIRATION RATE: 18 BRPM | DIASTOLIC BLOOD PRESSURE: 68 MMHG | BODY MASS INDEX: 20.16 KG/M2

## 2024-11-14 DIAGNOSIS — Z76.82 STEM CELL TRANSPLANT CANDIDATE: ICD-10-CM

## 2024-11-14 DIAGNOSIS — Z94.84 IMMUNOCOMPROMISED STATE ASSOCIATED WITH STEM CELL TRANSPLANT: ICD-10-CM

## 2024-11-14 DIAGNOSIS — D46.9 MYELODYSPLASTIC SYNDROME: Primary | ICD-10-CM

## 2024-11-14 DIAGNOSIS — D61.818 PANCYTOPENIA: ICD-10-CM

## 2024-11-14 DIAGNOSIS — D84.822 IMMUNOCOMPROMISED STATE ASSOCIATED WITH STEM CELL TRANSPLANT: ICD-10-CM

## 2024-11-14 DIAGNOSIS — Z94.81 HISTORY OF ALLOGENEIC BONE MARROW TRANSPLANT: Primary | ICD-10-CM

## 2024-11-14 DIAGNOSIS — G25.81 RESTLESS LEG SYNDROME: ICD-10-CM

## 2024-11-14 DIAGNOSIS — D46.9 MYELODYSPLASTIC SYNDROME: ICD-10-CM

## 2024-11-14 DIAGNOSIS — D53.9 NUTRITIONAL ANEMIA, UNSPECIFIED: ICD-10-CM

## 2024-11-14 LAB
ABO + RH BLD: NORMAL
ALBUMIN SERPL BCP-MCNC: 3.6 G/DL (ref 3.5–5.2)
ALP SERPL-CCNC: 79 U/L (ref 40–150)
ALT SERPL W/O P-5'-P-CCNC: 58 U/L (ref 10–44)
ANION GAP SERPL CALC-SCNC: 8 MMOL/L (ref 8–16)
AST SERPL-CCNC: 21 U/L (ref 10–40)
BASOPHILS # BLD AUTO: 0 K/UL (ref 0–0.2)
BASOPHILS NFR BLD: 0 % (ref 0–1.9)
BILIRUB SERPL-MCNC: 0.6 MG/DL (ref 0.1–1)
BLD GP AB SCN CELLS X3 SERPL QL: NORMAL
BUN SERPL-MCNC: 20 MG/DL (ref 8–23)
CALCIUM SERPL-MCNC: 9 MG/DL (ref 8.7–10.5)
CHLORIDE SERPL-SCNC: 104 MMOL/L (ref 95–110)
CO2 SERPL-SCNC: 26 MMOL/L (ref 23–29)
CREAT SERPL-MCNC: 0.7 MG/DL (ref 0.5–1.4)
DIFFERENTIAL METHOD BLD: ABNORMAL
EOSINOPHIL # BLD AUTO: 0 K/UL (ref 0–0.5)
EOSINOPHIL NFR BLD: 0 % (ref 0–8)
ERYTHROCYTE [DISTWIDTH] IN BLOOD BY AUTOMATED COUNT: 25 % (ref 11.5–14.5)
EST. GFR  (NO RACE VARIABLE): >60 ML/MIN/1.73 M^2
FOLATE SERPL-MCNC: 4.3 NG/ML (ref 4–24)
GLUCOSE SERPL-MCNC: 156 MG/DL (ref 70–110)
HCT VFR BLD AUTO: 35.5 % (ref 40–54)
HGB BLD-MCNC: 11.5 G/DL (ref 14–18)
IMM GRANULOCYTES # BLD AUTO: 0.06 K/UL (ref 0–0.04)
IMM GRANULOCYTES NFR BLD AUTO: 1.1 % (ref 0–0.5)
LYMPHOCYTES # BLD AUTO: 0.3 K/UL (ref 1–4.8)
LYMPHOCYTES NFR BLD: 5.6 % (ref 18–48)
MAGNESIUM SERPL-MCNC: 2 MG/DL (ref 1.6–2.6)
MCH RBC QN AUTO: 33.6 PG (ref 27–31)
MCHC RBC AUTO-ENTMCNC: 32.4 G/DL (ref 32–36)
MCV RBC AUTO: 104 FL (ref 82–98)
MONOCYTES # BLD AUTO: 0.4 K/UL (ref 0.3–1)
MONOCYTES NFR BLD: 7.5 % (ref 4–15)
NEUTROPHILS # BLD AUTO: 4.6 K/UL (ref 1.8–7.7)
NEUTROPHILS NFR BLD: 85.8 % (ref 38–73)
NRBC BLD-RTO: 0 /100 WBC
PHOSPHATE SERPL-MCNC: 3.7 MG/DL (ref 2.7–4.5)
PLATELET # BLD AUTO: 78 K/UL (ref 150–450)
PMV BLD AUTO: 11.7 FL (ref 9.2–12.9)
POTASSIUM SERPL-SCNC: 4.6 MMOL/L (ref 3.5–5.1)
PROT SERPL-MCNC: 5.7 G/DL (ref 6–8.4)
RBC # BLD AUTO: 3.42 M/UL (ref 4.6–6.2)
SODIUM SERPL-SCNC: 138 MMOL/L (ref 136–145)
SPECIMEN OUTDATE: NORMAL
TACROLIMUS BLD-MCNC: 8.5 NG/ML (ref 5–15)
VIT B12 SERPL-MCNC: 867 PG/ML (ref 210–950)
WBC # BLD AUTO: 5.33 K/UL (ref 3.9–12.7)

## 2024-11-14 PROCEDURE — 36592 COLLECT BLOOD FROM PICC: CPT

## 2024-11-14 PROCEDURE — 25000003 PHARM REV CODE 250: Performed by: INTERNAL MEDICINE

## 2024-11-14 PROCEDURE — 82746 ASSAY OF FOLIC ACID SERUM: CPT | Performed by: STUDENT IN AN ORGANIZED HEALTH CARE EDUCATION/TRAINING PROGRAM

## 2024-11-14 PROCEDURE — 63600175 PHARM REV CODE 636 W HCPCS: Performed by: INTERNAL MEDICINE

## 2024-11-14 PROCEDURE — 87799 DETECT AGENT NOS DNA QUANT: CPT | Performed by: INTERNAL MEDICINE

## 2024-11-14 PROCEDURE — 85025 COMPLETE CBC W/AUTO DIFF WBC: CPT | Performed by: INTERNAL MEDICINE

## 2024-11-14 PROCEDURE — 80053 COMPREHEN METABOLIC PANEL: CPT | Performed by: INTERNAL MEDICINE

## 2024-11-14 PROCEDURE — 82525 ASSAY OF COPPER: CPT | Performed by: STUDENT IN AN ORGANIZED HEALTH CARE EDUCATION/TRAINING PROGRAM

## 2024-11-14 PROCEDURE — 99999 PR PBB SHADOW E&M-EST. PATIENT-LVL IV: CPT | Mod: PBBFAC,,, | Performed by: INTERNAL MEDICINE

## 2024-11-14 PROCEDURE — 86850 RBC ANTIBODY SCREEN: CPT | Performed by: INTERNAL MEDICINE

## 2024-11-14 PROCEDURE — 82607 VITAMIN B-12: CPT | Performed by: STUDENT IN AN ORGANIZED HEALTH CARE EDUCATION/TRAINING PROGRAM

## 2024-11-14 PROCEDURE — A4216 STERILE WATER/SALINE, 10 ML: HCPCS | Performed by: INTERNAL MEDICINE

## 2024-11-14 PROCEDURE — 80197 ASSAY OF TACROLIMUS: CPT | Performed by: INTERNAL MEDICINE

## 2024-11-14 PROCEDURE — 84100 ASSAY OF PHOSPHORUS: CPT | Performed by: INTERNAL MEDICINE

## 2024-11-14 PROCEDURE — 83735 ASSAY OF MAGNESIUM: CPT | Performed by: INTERNAL MEDICINE

## 2024-11-14 RX ORDER — SODIUM CHLORIDE 0.9 % (FLUSH) 0.9 %
10 SYRINGE (ML) INJECTION
Status: CANCELLED | OUTPATIENT
Start: 2024-11-14

## 2024-11-14 RX ORDER — SODIUM CHLORIDE 0.9 % (FLUSH) 0.9 %
10 SYRINGE (ML) INJECTION
Status: DISCONTINUED | OUTPATIENT
Start: 2024-11-14 | End: 2024-11-14 | Stop reason: HOSPADM

## 2024-11-14 RX ORDER — HEPARIN 100 UNIT/ML
500 SYRINGE INTRAVENOUS
Status: DISCONTINUED | OUTPATIENT
Start: 2024-11-14 | End: 2024-11-14 | Stop reason: HOSPADM

## 2024-11-14 RX ORDER — HEPARIN 100 UNIT/ML
500 SYRINGE INTRAVENOUS
OUTPATIENT
Start: 2024-11-14

## 2024-11-14 RX ORDER — SODIUM CHLORIDE 0.9 % (FLUSH) 0.9 %
10 SYRINGE (ML) INJECTION
OUTPATIENT
Start: 2024-11-14

## 2024-11-14 RX ORDER — ROPINIROLE 0.5 MG/1
0.5 TABLET, FILM COATED ORAL NIGHTLY
Qty: 30 TABLET | Refills: 11 | Status: SHIPPED | OUTPATIENT
Start: 2024-11-14 | End: 2025-11-14

## 2024-11-14 RX ORDER — HEPARIN 100 UNIT/ML
500 SYRINGE INTRAVENOUS
Status: CANCELLED | OUTPATIENT
Start: 2024-11-14

## 2024-11-14 RX ADMIN — Medication 10 ML: at 09:11

## 2024-11-14 RX ADMIN — HEPARIN 500 UNITS: 100 SYRINGE at 09:11

## 2024-11-14 NOTE — PROGRESS NOTES
Section of Hematology and Stem Cell Transplantation    Post-Transplantation Follow Up Visit     11/14/2024    Transplant History:   Primary oncologist: Paco Hickey MD  Primary oncologic diagnosis: MDS  Transplant date: 9/19/2024  Donor: haploidentical  Blood Type (Patient): B +  Blood Type (Donor): A +  CMV (Patient): Positive  CMV (Donor): Positive  Graft source: Bone marrow  CD34+ cell dose: 3.55x10^6  Conditioning Regimen: Fludarabine plus melphalan 100 mg/m2 + 2Gy TBI  GVHD prophylaxis: Post-transplant cyclophosphamide, Tacrolimus, MMF  Immediate post-transplant complications: Patient experienced expected GI toxicities with C diff negative diarrhea and nausea, neutropenic fever with negative infectious work up, expected cytopenias requiring transfusions, electrolyte abnormalities requiring replacement, volume overload requiring diuresis, intermittent SOB from pulmonary edema requiring 02, and a hemorrhoid flare up. Diarrhea and SOB improved towards the end of the hospital stay.     History of Present Ilness:   Guillaume Salinas (Guillaume) is a pleasant 69 y.o.male with a past medical history of MDS who is status post haploidentical stem cell transplantation conditioned with FluMel 100 + PTCy+ 2Gy TBI who is currently day +56  who presents for post-transplant follow up. He is says he is feeling fine overall but he did have 6 episodes of diarrhea yesterday and had to take two imodium. He did not throw up but did feel nauseated. He has not had any diarrhea since last night. He did not eat anything that would have upset his stomach. Overall his nausea has been better since starting prednisone. He states he feels the ropinirole has been helping but has not completely gotten rid of his restless leg yet. His sleep is still not great but is improving. He has been more active lately according to his wife. He has no new rashes.     PAST MEDICAL HISTORY:   Past Medical History:   Diagnosis Date    Anticoagulant  long-term use     Coronary artery disease     Hypertension     Myelodysplastic syndrome     Peripheral vascular disease, unspecified        PAST SURGICAL HISTORY:   Past Surgical History:   Procedure Laterality Date    BONE MARROW BIOPSY Left 2023    Procedure: Biopsy-bone marrow;  Surgeon: Harry Diamond MD;  Location: [facility] [place] OR;  Service: Oncology;  Laterality: Left;    COLONOSCOPY N/A 2022    Procedure: COLONOSCOPY Golytely Vaccinated will bring cards;  Surgeon: Dereje Simon MD;  Location: [facility] [place] ENDO;  Service: Endoscopy;  Laterality: N/A;  Do not cancel this order    INSERTION OF LEMONS CATHETER Right 2024    Procedure: INSERTION, CATHETER, CENTRAL VENOUS, LEMONS TRIPLE LUMEN;  Surgeon: Kg Patten MD;  Location: [address];  Service: General;  Laterality: Right;     PAST SOCIAL HISTORY:  Social History     Socioeconomic History    Marital status:    Tobacco Use    Smoking status: Former     Current packs/day: 0.00     Average packs/day: 0.3 packs/day for 50.0 years (12.5 ttl pk-yrs)     Types: Cigarettes     Start date: 3/1/1973     Quit date: 3/1/2023     Years since quittin.7     Passive exposure: Past    Smokeless tobacco: Never   Substance and Sexual Activity    Alcohol use: Not Currently    Drug use: Never    Sexual activity: Not Currently     Partners: Female     Social Drivers of Health     Financial Resource Strain: Low Risk  (2024)    Overall Financial Resource Strain (CARDIA)     Difficulty of Paying Living Expenses: Not hard at all   Food Insecurity: No Food Insecurity (2024)    Hunger Vital Sign     Worried About Running Out of Food in the Last Year: Never true     Ran Out of Food in the Last Year: Never true   Transportation Needs: No Transportation Needs (2024)    TRANSPORTATION NEEDS     Transportation : No   Physical Activity: Insufficiently Active (2024)    Exercise Vital Sign     Days of Exercise per Week: 2 days      Minutes of Exercise per Session: 60 min   Stress: Stress Concern Present (9/30/2024)    [employer] - Occupational Stress Questionnaire     Feeling of Stress : To some extent   Housing Stability: Low Risk  (9/30/2024)    Housing Stability Vital Sign     Unable to Pay for Housing in the Last Year: No     Homeless in the Last Year: No       FAMILY HISTORY:  Cancer-related family history includes Cancer in his brother and father.    CURRENT MEDICATIONS:   Medication List with Changes/Refills   Current Medications    ACYCLOVIR (ZOVIRAX) 800 MG TAB    Take 1 tablet (800 mg total) by mouth 2 (two) times daily.    BUDESONIDE (ENTOCORT EC) 3 MG CAPSULE    Take 1 capsule (3 mg total) by mouth 3 (three) times daily.    CARVEDILOL (COREG) 6.25 MG TABLET    Take 1 tablet (6.25 mg total) by mouth 2 (two) times daily.    GABAPENTIN (NEURONTIN) 300 MG CAPSULE    Take 2 capsules (600 mg total) by mouth every evening.    LETERMOVIR (PREVYMIS) 480 MG TAB    Take 1 tablet (480 mg total) by mouth Daily.    LIDOCAINE (LIDODERM) 5 %    Place 1 patch onto the skin once daily. Remove & Discard patch within 12 hours or as directed by MD. Place patch to left shoulder.    LOPERAMIDE (IMODIUM) 2 MG CAPSULE    Take 1 capsule (2 mg total) by mouth 2 (two) times daily as needed for Diarrhea.    MAGNESIUM OXIDE (MAG-OX) 400 MG (241.3 MG MAGNESIUM) TABLET    Take 2 tablets (800 mg total) by mouth 2 (two) times daily.    ONDANSETRON (ZOFRAN-ODT) 8 MG TBDL    Take 1 tablet (8 mg total) by mouth every 8 (eight) hours as needed (nausea/vomiting).    PANTOPRAZOLE (PROTONIX) 40 MG TABLET    Take 1 tablet (40 mg total) by mouth once daily.    POSACONAZOLE (NOXAFIL) 100 MG TBEC TABLET    Take 3 tablets (300 mg total) by mouth once daily.    PREDNISONE (DELTASONE) 10 MG TABLET    Take 3 tablets (30 mg total) by mouth once daily.    PROCHLORPERAZINE (COMPAZINE) 5 MG TABLET    Take 1 tablet (5 mg total) by mouth 4 (four) times  daily as needed for Nausea.    ROPINIROLE (REQUIP) 0.25 MG TABLET    Take 1 tablet (0.25 mg total) by mouth every evening.    SULFAMETHOXAZOLE-TRIMETHOPRIM 800-160MG (BACTRIM DS) 800-160 MG TAB    Take 1 tablet by mouth every Mon, Wed, Fri. START 10/21/24    TACROLIMUS (PROGRAF) 0.5 MG CAP    Take 1 capsule (0.5 mg total) by mouth every morning AND 1 capsule (0.5 mg total) every evening.    TRAMADOL (ULTRAM) 50 MG TABLET    Take 1 tablet (50 mg total) by mouth every 6 (six) hours as needed for Pain.    ZOLPIDEM (AMBIEN) 5 MG TAB    Take 1 tablet (5 mg total) by mouth nightly as needed (insomnia).       ALLERGIES:   Review of patient's allergies indicates:  No Known Allergies    GVHD Review of Systems:     Pertinent positives and negatives included in the HPI. Otherwise a 14 point review of systems is negative. GVHD review of systems recorded in BMT flowsheet.     Physical Exam:     Vitals:    11/14/24 0926   BP: 111/68   Pulse: 74   Resp: 18   Temp: 97.7 °F (36.5 °C)          General: Appears well, NAD. Comes into clinic in wheelchair  HEENT: MMM, no OP lesions  Pulmonary: CTAB, no increased work of breathing, no W/R/C  Cardiovascular: S1S2 normal, RRR, no M/R/G  Abdominal: Soft, NT, ND, BS+, no HSM  Extremities: No C/C/E  Neurological: AAOx4, grossly normal, no focal deficits  Dermatologic: No appreciable rashes or lesions    ECOG Performance Status: (foot note - ECOG PS provided by Eastern Cooperative Oncology Group) 1 - Symptomatic but completely ambulatory    Karnofsky Performance Score:  80%- Normal Activity with Effort: Some Symptoms of Disease    Labs:   Lab Results   Component Value Date    WBC 5.33 11/14/2024    HGB 11.5 (L) 11/14/2024    HCT 35.5 (L) 11/14/2024     (H) 11/14/2024    PLT 78 (L) 11/14/2024        CMP  Sodium   Date Value Ref Range Status   11/14/2024 138 136 - 145 mmol/L Final     Potassium   Date Value Ref Range Status   11/14/2024 4.6 3.5 - 5.1 mmol/L Final     Chloride   Date Value  Ref Range Status   11/14/2024 104 95 - 110 mmol/L Final     CO2   Date Value Ref Range Status   11/14/2024 26 23 - 29 mmol/L Final     Glucose   Date Value Ref Range Status   11/14/2024 156 (H) 70 - 110 mg/dL Final     BUN   Date Value Ref Range Status   11/14/2024 20 8 - 23 mg/dL Final     Creatinine   Date Value Ref Range Status   11/14/2024 0.7 0.5 - 1.4 mg/dL Final     Calcium   Date Value Ref Range Status   11/14/2024 9.0 8.7 - 10.5 mg/dL Final     Total Protein   Date Value Ref Range Status   11/14/2024 5.7 (L) 6.0 - 8.4 g/dL Final     Albumin   Date Value Ref Range Status   11/14/2024 3.6 3.5 - 5.2 g/dL Final     Total Bilirubin   Date Value Ref Range Status   11/14/2024 0.6 0.1 - 1.0 mg/dL Final     Comment:     For infants and newborns, interpretation of results should be based  on gestational age, weight and in agreement with clinical  observations.    Premature Infant recommended reference ranges:  Up to 24 hours.............<8.0 mg/dL  Up to 48 hours............<12.0 mg/dL  3-5 days..................<15.0 mg/dL  6-29 days.................<15.0 mg/dL       Alkaline Phosphatase   Date Value Ref Range Status   11/14/2024 79 40 - 150 U/L Final     AST   Date Value Ref Range Status   11/14/2024 21 10 - 40 U/L Final     ALT   Date Value Ref Range Status   11/14/2024 58 (H) 10 - 44 U/L Final     Anion Gap   Date Value Ref Range Status   11/14/2024 8 8 - 16 mmol/L Final     eGFR   Date Value Ref Range Status   11/14/2024 >60.0 >60 mL/min/1.73 m^2 Final          Imaging:   Hospital imaging reviewed.    Pathology:  Prior pathology reviewed. Plan for day +30 bone marrow biopsy on 10/23/24    Acute GVHD Scoring:  GVHD Acute Assessment      No GVHD at this time. Unable to record via flowsheets.               Assessment and Plan:   Guillaume Salinas (Guillaume) is a pleasant 69 y.o.male with a past medical history of MDS s/p haplo SCT who presents for post-transplant follow up.    MDS: Status post treatment with  azacitidine 75 mg/m2 daily x7 days plus venetoclax 100mg (voriconazole) daily x14 of 28 days.Venetoclax decreased to 7 days with cycle 3 until completion of 11 cycles. No plans for maintenance at this time.     Status post allogeneic stem cell transplantation: As noted above, status post haploidentical stem cell transplantation conditioned with FluMel 100 + PTCy+ 2Gy TBI . Currently Day+ 56. Engrafted on 10/09/24 day +20.  Day 30 bone marrow (11/5/2024) showing mildly hypercellular marrow with no increased blast (0.3%) and no evidence of myeloid neoplasm. Pending chimerisms, NGS, and CG  Graft versus host disease: GVHD prophylaxis with Post-transplant cyclophosphamide, Tacrolimus, MMF (MMF d/c on D+35). Persistent nausea despite anti-emetics, reducing pill burden. 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 He started budesonide 3mg TID on 10/28/24. Persistent nausea despite budesonide so started systemic steroids 11/1 at 0.5 mg/kg (currently 20 mg daily) and his steroid taper has been given to him.   Current tacro dose: 0.5 mg BID  Last tacro level: 8.5  Adjustments: no adjustments   Steroid tapering schedule is below  Date              Steroid Dose  11/04 - 11/10 Take 3 tablets (30 mg) by mouth once daily  11/11 - 11/17 Take 2 tablets (20 mg) by mouth once daily  11/18 - 11/24 Take 1 tablet (10 mg) by mouth once daily  11/25 - 12/01 Take ½ tablet (5 mg) by mouth once daily.  12/02 - 12/08 Take ½ tablet (5 mg) by mouth every other day   12/09 STOP     Immunosuppression: Prevention with posaconazole, acyclovir. CMV prophylaxis with letermovir. PJP prophyalxis Bactrim MW. Continue weekly monitoring of CMV and EBV.  Last CMV: Not-detected, last EBV: Not-detected.   Active infections: N/A    Pancytopenia: Due to underlying disease and chemotherapy. Transfuseu for Hgb <7 g/dL and platelets <10k.    Hypomagnesemia: Related to tacro, poor po intake. Normal today.   -Continue MagOx to 800mg twice daily.       Chemotherapy Induced Nausea: Resolved with steroids. Tapering as above.     Anxiety, Restless Leg Syndrome: Noted since discharge by family. Discontinuined scheduled zyprexa/compazine as this may be medication induced. Stopped Seroquel. He started ropinirole 0.25mg on 11/11/24 with some relief.   Increase ropinirole to 0.5mg daily. Can increase to 1mg in 1 week if no improvement.    Insomnia: Patient still dealing with some insomnia but improving. Explained to patient the steroids may have some effect on him not sleeping well. Stopped Seroquel and started Ambien. Ropinirole started for RLS.     Follow up: Twice weekly follow up with labs.    Orders Placed:      Orders Placed This Encounter    rOPINIRole (REQUIP) 0.5 MG tablet       Follow Up:      Twice weekly follow up as scheduled.       Paco Hickey MD  Malignant Hematology, Stem Cell Transplant, and Cellular Therapy  [facility]  [facility]

## 2024-11-14 NOTE — NURSING
MEDICARE WELLNESS VISIT NOTE      History of Present Illness:   Maggy Myers presents for her Subsequent Medicare Medicare Wellness Visit.   She has no current complaints or concerns.      Patient Care Team:  Verify Pcp as PCP - General        Patient Active Problem List    Diagnosis Date Noted   • Impaired fasting glucose 12/12/2022     Priority: Low   • Dyslipidemia 12/04/2020     Priority: Low     12/04/2020: The 10-year ASCVD risk score (Flor SALDANA Jr., et al., 2013) is: 6.7%    Values used to calculate the score:      Age: 65 years      Sex: Female      Is Non- : No      Diabetic: No      Tobacco smoker: No      Systolic Blood Pressure: 136 mmHg      Is BP treated: No      HDL Cholesterol: 48 mg/dL      Total Cholesterol: 204 mg/dL       • Decreased GFR 04/10/2017     Priority: Low   • Hypovitaminosis D 04/10/2017     Priority: Low   • Osteopenia of multiple sites 04/06/2017     Priority: Low   • Obesity (BMI 30-39.9) 03/20/2017     Priority: Low   • Thoracic kyphosis 03/20/2017     Priority: Low         Past Medical History:   Diagnosis Date   • Abnormal Pap smear of cervix 2000    reactive, reparative atypical squamous cell   • Radial head fracture, closed 06/09/2009         Past Surgical History:   Procedure Laterality Date   • Colonoscopy  11/20/2008   • Colposcopy  09/13/2000    reactive, reparative atypical squamous cell   • Eye surgery  1959    strabismus         Social History     Tobacco Use   • Smoking status: Never Smoker   • Smokeless tobacco: Never Used   Vaping Use   • Vaping Use: never used   Substance Use Topics   • Alcohol use: Yes     Comment: occasionally   • Drug use: No     Drug use:    Drug Use:    No              Family History - Reviewed    Current Outpatient Medications   Medication Sig Dispense Refill   • fluocinonide (LIDEX) 0.05 % gel Apply topically 2 times daily.     • Calcium Carbonate-Vitamin D (LIQUID CALCIUM/VITAMIN D PO)      • Cholecalciferol  Labs from CVC sent and sterile dressing change done. D/C in stable condition.    (D3-1000 PO) Take 2,000 Int'l Units by mouth daily.     • aspirin 81 MG tablet Take 81 mg by mouth daily.     • Multiple Vitamins-Minerals (MULTIVITAMIN & MINERAL PO) Take 1 tablet by mouth daily.       No current facility-administered medications for this visit.        1.) Do you have an Advance directive, living will, or power of  for health care document that contains your wishes for end of life care?: No     2.) Would you like additional information on advance directives?: Yes     6 a.) How many servings of Fruits and Vegetables do you have each day ( 1 serving = 1 piece of fruit, 1/2 cup fruits or vegetables): 1 per day        Vision and hearing screens:   No exam data present    Advance Directive:   Advance Directives:  Not on file.      Cognitive Assessment: no evidence of cognitive dysfunction by direct observation      Recent Review Flowsheet Data     Date 11/25/2021    Adult PHQ 2 Score 0    Adult PHQ 2 Interpretation No further screening needed    Little interest or pleasure in activity? Not at all    Feeling down, depressed or hopeless? Not at all          DEPRESSION ASSESSMENT/PLAN:  Depression screening is negative no further plan needed.     Body mass index is 36.27 kg/m².   Encouraged patient to work on healthy well balanced diet. Discussed importance of 30 minutes of aerobic exercise most days of the week.    BMI ASSESSMENT/PLAN:  Patient is obese.    see above.         Needed Screening/Treatment:   Cardiovascular screening - Lipids , Diabetes screening , Annual mammogram , Colorectal cancer screening , Hepatitis C and Immunizations reviewed and patient needs: Pneumococcal 23  Needed follow up:  None    ------------------------------------------------------------------------    REVIEW OF SYSTEMS: The patient DENIES symptoms of:   General: Fever, chills, night sweats, fatigue, weight loss, weight gain, decreased appetite.  Skin: Rash, itching, lumps or bumps or moles that are changing, hair  changes, nail changes.  Eyes: Visual blurring, double vision, spots before the eyes, eye pain.  Ears: Decreased auditory acuity, ringing or tinnitus in the ears, pain in the ears, discharge from the ears.  Nose: Nosebleed, discharge from the nose, decrease in ability to smell.  Mouth: Soreness of the mouth or tongue or lips, abnormality of the teeth or gums.  Throat: Sore throat, hoarseness or voice change.  Neck: Stiffness or pain in the neck.  Breasts: Changes of the breasts, lumps or bumps in the breasts, discharge from the nipples, pain in the breasts, nipple changes.  Cardiorespiratory: Chest pain, edema, cough, hemoptysis, wheeze, shortness of breath.  Gastrointestinal: Abdominal pain, nausea, vomiting, constipation, diarrhea, black tarry stools, blood in the stools, change in the bowel habits, sour burps or heartburn, indigestion.  Genitourinary: Urgency, frequency, dysuria, hematuria, nocturia, vaginal discharge, vaginal itching, abnormal vaginal bleeding or pain.  Neurologic: Headache, weakness, loss of sensation, visual disturbance, trouble with balance or coordination, loss of memory.  Musculoskeletal: Joint pain, muscle pain.  Psychiatric: Symptoms of depression, feeling sad or blue.    PHYSICAL EXAM:    Visit Vitals  /72   Pulse 70   Temp 97.1 °F (36.2 °C) (Temporal)   Ht 5' 5.75\" (1.67 m)   Wt 101.2 kg (223 lb)   SpO2 99%   BMI 36.27 kg/m²      GENERAL APPEARANCE: Appears stated age, is in no apparent distress, is well developed and well nourished.  SKIN: Normal color for ethnicity, normal texture, good turgor, no skin rashes, no atypical-appearing skin lesions, no bruises.  LYMPH NODES: No cervical or supraclavicular adenopathy.   HEAD: Normocephalic.  EYES: Pupils are equal and reactive to light and accommodation, extraocular movements are full, sclerae and conjunctivae are normal, lids and lashes are normal.  EARS: Pinnae and external ears are normal bilaterally, external auditory canals are  normal, tympanic membranes are normal,  there is no tragal tenderness, auditory acuity is grossly intact.    NOSE: External nose is normal to inspection, no septal deviation.  MOUTH/THROAT: Tongue is midline and appears normal, oropharynx appears normal, soft palate and uvula are normal, oral mucosa is normal.  Dentition in good repair.  NECK: Neck is supple, no thyromegaly, trachea is midline.  CHEST: Configuration normal, nontender to palpation, respiratory effort is not labored.  LUNGS: Lungs are clear to auscultation with normal inspiratory/expiratory sounds, no rales, no rhonchi, no wheezes.  CARDIOVASCULAR: Normal point of maximal impulse, normal rate and rhythm, no palpable heaves or thrills, S1 and S2 normal, no S3 or S4, no murmurs, no extra heart sounds.   No carotid or abdominal bruits.  ABDOMEN: Abdomen is soft, normal active bowel sounds, nontender, without masses, without hepatomegaly or splenomegaly, no pulsatile masses.  BACK: Inspection shows kyphosis  EXTREMITIES: No clubbing, no cyanosis, no edema, normal muscle tone and development bilaterally.  NEUROLOGIC:  Alert, appropriate, oriented x 3.  Speech fluent. Cranial nerves 2-12 intact, motor strength intact and symmetric, no apraxia or ataxia observed, deep tendon reflexes normal and symmetric, no tremor noted.  PSYCHIATRIC:  Affect appropriate, insight fair, mood good.    ASSESSMENT/PLAN:  1. Welcome to Medicare preventive visit  (primary encounter diagnosis)  Comment: Discussed pertinent positives of medicare questionnaire of which are address below or patient wishes to have no further discussion on.    Advanced directives on file: Given information previously.   Glaucoma screening: Recommended annually.   Dental examination: Recommended annually, and does three times a year.  Cholesterol Screening done: 2022  U/S Aorta done: n/a  CT lung done: n/a  DM screenin2022, 134.   Mammo done: Order placed.   Osteoporosis:  04/05/2017  Cervical Cancer screening done: n/a  Colorectal cancer screening done: 01/08/2021  HCV screening: Ordered.   Vaccines due: Tdap.      2. Elevated fasting glucose  Plan: Order placed for a1c today.     3. Obesity/Dyslipidemia  Plan: Discussed with patient lab results. Also discussed ASCVD risk score. Currently no indication to start on statin therapy.     The 10-year ASCVD risk score (Renetta DOVE, et al., 2019) is: 6.2%    Values used to calculate the score:      Age: 67 years      Sex: Female      Is Non- : No      Diabetic: No      Tobacco smoker: No      Systolic Blood Pressure: 120 mmHg      Is BP treated: No      HDL Cholesterol: 47 mg/dL      Total Cholesterol: 187 mg/dL     4. Osteopenia, unspecified location/Postmenopausal  Plan: BD DEXA AXIAL SKELETON       Update on imaging.     Will notify the patient of lab results as the become available.     Follow up in 1 year for MWV, labs to be done prior.     All questions asked and answered.   Patient is agreeable plan of care, with no further concerns at this time.

## 2024-11-18 ENCOUNTER — OFFICE VISIT (OUTPATIENT)
Dept: HEMATOLOGY/ONCOLOGY | Facility: CLINIC | Age: 69
End: 2024-11-18
Payer: MEDICARE

## 2024-11-18 ENCOUNTER — INFUSION (OUTPATIENT)
Dept: INFUSION THERAPY | Facility: HOSPITAL | Age: 69
End: 2024-11-18
Payer: MEDICARE

## 2024-11-18 ENCOUNTER — CLINICAL SUPPORT (OUTPATIENT)
Dept: HEMATOLOGY/ONCOLOGY | Facility: CLINIC | Age: 69
End: 2024-11-18
Payer: MEDICARE

## 2024-11-18 VITALS — BODY MASS INDEX: 20.16 KG/M2 | HEIGHT: 69 IN | WEIGHT: 136.13 LBS

## 2024-11-18 VITALS
HEIGHT: 69 IN | OXYGEN SATURATION: 99 % | BODY MASS INDEX: 20.18 KG/M2 | RESPIRATION RATE: 18 BRPM | WEIGHT: 136.25 LBS | HEART RATE: 72 BPM | SYSTOLIC BLOOD PRESSURE: 116 MMHG | DIASTOLIC BLOOD PRESSURE: 74 MMHG

## 2024-11-18 DIAGNOSIS — D53.9 NUTRITIONAL ANEMIA, UNSPECIFIED: ICD-10-CM

## 2024-11-18 DIAGNOSIS — Z94.84 IMMUNOCOMPROMISED STATE ASSOCIATED WITH STEM CELL TRANSPLANT: ICD-10-CM

## 2024-11-18 DIAGNOSIS — Z76.82 STEM CELL TRANSPLANT CANDIDATE: ICD-10-CM

## 2024-11-18 DIAGNOSIS — D84.81 IMMUNODEFICIENCY DUE TO CONDITIONS CLASSIFIED ELSEWHERE: ICD-10-CM

## 2024-11-18 DIAGNOSIS — D46.9 MYELODYSPLASTIC SYNDROME: ICD-10-CM

## 2024-11-18 DIAGNOSIS — R30.0 DYSURIA: ICD-10-CM

## 2024-11-18 DIAGNOSIS — Z94.81 HISTORY OF ALLOGENEIC BONE MARROW TRANSPLANT: Primary | ICD-10-CM

## 2024-11-18 DIAGNOSIS — D84.822 IMMUNOCOMPROMISED STATE ASSOCIATED WITH STEM CELL TRANSPLANT: ICD-10-CM

## 2024-11-18 DIAGNOSIS — G47.00 INSOMNIA, UNSPECIFIED TYPE: ICD-10-CM

## 2024-11-18 DIAGNOSIS — G25.81 RESTLESS LEG SYNDROME: ICD-10-CM

## 2024-11-18 LAB
ALBUMIN SERPL BCP-MCNC: 3.5 G/DL (ref 3.5–5.2)
ALP SERPL-CCNC: 96 U/L (ref 40–150)
ALT SERPL W/O P-5'-P-CCNC: 50 U/L (ref 10–44)
ANION GAP SERPL CALC-SCNC: 7 MMOL/L (ref 8–16)
AST SERPL-CCNC: 20 U/L (ref 10–40)
BASOPHILS # BLD AUTO: 0.01 K/UL (ref 0–0.2)
BASOPHILS NFR BLD: 0.1 % (ref 0–1.9)
BILIRUB SERPL-MCNC: 0.6 MG/DL (ref 0.1–1)
BUN SERPL-MCNC: 22 MG/DL (ref 8–23)
CALCIUM SERPL-MCNC: 9.1 MG/DL (ref 8.7–10.5)
CHLORIDE SERPL-SCNC: 104 MMOL/L (ref 95–110)
CMV DNA SPEC QL NAA+PROBE: NORMAL
CO2 SERPL-SCNC: 28 MMOL/L (ref 23–29)
CREAT SERPL-MCNC: 0.8 MG/DL (ref 0.5–1.4)
CYTOMEGALOVIRUS PCR, QUANT: NOT DETECTED IU/ML
DACRYOCYTES BLD QL SMEAR: ABNORMAL
DIFFERENTIAL METHOD BLD: ABNORMAL
EOSINOPHIL # BLD AUTO: 0.1 K/UL (ref 0–0.5)
EOSINOPHIL NFR BLD: 0.8 % (ref 0–8)
EPSTEIN-BARR VIRUS DNA: ABNORMAL
EPSTEIN-BARR VIRUS PCR, QUANT: ABNORMAL IU/ML
ERYTHROCYTE [DISTWIDTH] IN BLOOD BY AUTOMATED COUNT: ABNORMAL % (ref 11.5–14.5)
EST. GFR  (NO RACE VARIABLE): >60 ML/MIN/1.73 M^2
GLUCOSE SERPL-MCNC: 92 MG/DL (ref 70–110)
HCT VFR BLD AUTO: 36.3 % (ref 40–54)
HGB BLD-MCNC: 11.9 G/DL (ref 14–18)
IMM GRANULOCYTES # BLD AUTO: 0.05 K/UL (ref 0–0.04)
IMM GRANULOCYTES NFR BLD AUTO: 0.6 % (ref 0–0.5)
LYMPHOCYTES # BLD AUTO: 0.3 K/UL (ref 1–4.8)
LYMPHOCYTES NFR BLD: 4.3 % (ref 18–48)
MAGNESIUM SERPL-MCNC: 2 MG/DL (ref 1.6–2.6)
MCH RBC QN AUTO: 34.9 PG (ref 27–31)
MCHC RBC AUTO-ENTMCNC: 32.8 G/DL (ref 32–36)
MCV RBC AUTO: 107 FL (ref 82–98)
MONOCYTES # BLD AUTO: 0.6 K/UL (ref 0.3–1)
MONOCYTES NFR BLD: 7 % (ref 4–15)
NEUTROPHILS # BLD AUTO: 6.9 K/UL (ref 1.8–7.7)
NEUTROPHILS NFR BLD: 87.2 % (ref 38–73)
NRBC BLD-RTO: 0 /100 WBC
PHOSPHATE SERPL-MCNC: 3 MG/DL (ref 2.7–4.5)
PLATELET # BLD AUTO: 64 K/UL (ref 150–450)
PLATELET BLD QL SMEAR: ABNORMAL
PMV BLD AUTO: 10.4 FL (ref 9.2–12.9)
POIKILOCYTOSIS BLD QL SMEAR: SLIGHT
POTASSIUM SERPL-SCNC: 4.4 MMOL/L (ref 3.5–5.1)
PROT SERPL-MCNC: 5.5 G/DL (ref 6–8.4)
RBC # BLD AUTO: 3.41 M/UL (ref 4.6–6.2)
SCHISTOCYTES BLD QL SMEAR: ABNORMAL
SCHISTOCYTES BLD QL SMEAR: PRESENT
SODIUM SERPL-SCNC: 139 MMOL/L (ref 136–145)
TACROLIMUS BLD-MCNC: 7.6 NG/ML (ref 5–15)
WBC # BLD AUTO: 7.87 K/UL (ref 3.9–12.7)

## 2024-11-18 PROCEDURE — 99999 PR PBB SHADOW E&M-EST. PATIENT-LVL IV: CPT | Mod: PBBFAC,,, | Performed by: PHYSICIAN ASSISTANT

## 2024-11-18 PROCEDURE — 87799 DETECT AGENT NOS DNA QUANT: CPT | Performed by: INTERNAL MEDICINE

## 2024-11-18 PROCEDURE — 80053 COMPREHEN METABOLIC PANEL: CPT | Performed by: INTERNAL MEDICINE

## 2024-11-18 PROCEDURE — 84100 ASSAY OF PHOSPHORUS: CPT | Performed by: INTERNAL MEDICINE

## 2024-11-18 PROCEDURE — 99999 PR PBB SHADOW E&M-EST. PATIENT-LVL II: CPT | Mod: PBBFAC,,,

## 2024-11-18 PROCEDURE — 36592 COLLECT BLOOD FROM PICC: CPT

## 2024-11-18 PROCEDURE — 80197 ASSAY OF TACROLIMUS: CPT | Performed by: INTERNAL MEDICINE

## 2024-11-18 PROCEDURE — 85025 COMPLETE CBC W/AUTO DIFF WBC: CPT | Performed by: INTERNAL MEDICINE

## 2024-11-18 PROCEDURE — 83735 ASSAY OF MAGNESIUM: CPT | Performed by: INTERNAL MEDICINE

## 2024-11-18 PROCEDURE — 86850 RBC ANTIBODY SCREEN: CPT | Performed by: INTERNAL MEDICINE

## 2024-11-18 RX ORDER — FOLIC ACID 1 MG/1
1 TABLET ORAL DAILY
Qty: 100 TABLET | Refills: 3 | Status: SHIPPED | OUTPATIENT
Start: 2024-11-18 | End: 2025-11-18

## 2024-11-18 NOTE — PROGRESS NOTES
BMT Pharmacist Medication Review Note     All current medications were reviewed with the patient and his caregiver. The patient brought in all of his medications with him to this visit.      We discussed the changes made since last meeting:   - Prednisone dose was reduced to 10 mg daily per steroid taper schedule.     - Patient reports improved sleep while on ropinirole.     The patient has ample supply of all medications except for Acyclovir 800 mg tablet and budesonide.   - Patient had acyclovir 400 mg tablets in bag, educated patient that this was the lower dose he was on prior to transplant. Patient aware that 2 400 mg tablets were placed in the morning and evening pill box slot to make up the 800 mg dose he should be on. Patient is aware that he has refills for the 800 mg tablet available at his primary pharmacy.   - Patient aware that Budesonide is in pill box through Saturday morning, and he will need a refill.     Medicamentos/Medications Indicación/Indication Mañana/Morning Tarde/Afternoon Noche/Night   Acyclovir  400 mg tablet Prevención de infecciones virales  Viral infection prevention 2  tableta  2  tableta   Letermovir (Prevymis®) 480mg Prevención de infección por CMV  CMV infection prevention 1 tableta     Posaconazole 100mg Prevencón de infecciones fungales  Fungal infection prevention 3 tabletas     Sulfamethoxazole-trimethoprim (Bactrim®) 800-160mg Fungal pneumonia prevention 1 tableta los lunes, miércoles y viernes (Mon., Wed., Fri)      **Tacrolimus 0.5mg Prevención de GVHD - NO tome la dosis de por la mañana en días que se relizará laboratorios  1 cápsula  1 cápsulas   Budesonide 3 mg  GI GVHD 1 cápsula 1 cápsula 1 cápsula   Prednisone 10mg GVHD 11/18 - 11/24: 1 tableta ivette vez al día  11/25 - 12/01: ½ tableta ivette vez al día  12/02 - 12/08: ½ tableta día por medio  12/09: Detener   Magnesium oxide 400mg Suplemento de magnesio 2 tabletas  2 tabletas      Ropinirole (Requip) 0.5 mg raji garcia    1 tableta   Pantoprazole (Protonix) 40 mg reflujo ácido   1 tableta     Carvedilol (Coreg®) 6.25 mg Presión arterial maeve  1 tableta  1 tableta   Gabapentin (Neurontin®) 300 mg Neuropatía   2 cápsulas   Lidocaine patch (Lidoderm®) 5% Dolor en los hombros 1 parcho en el hombro milagros y  1 parcho en el hombro derecho  Remueva luego de las 12 horas     **medicamento nuevo o cambios realizados al medicamento  MEDICAMENTOS CUANDO VI NECESARIOS/AS NEEDED MEDICATIONS:                                                                    Loperamida (Imodium®) 2 mg dos veces al día según sea necesario para la diarrea  Ondansetron 8mg hasta sosa veces al día según sea necesario para Náuseas/Vómito  Prochlorperazine (Compazine®) 5mg cada 6 horas as needed for Náuseas/Vómito                                                           Tramadol (Ultram®) 50 mg cada 6 horas cuando sea necesario para el dolor         **Zolpidem (Ambien) 5 mg tableta todas las noches según sea necesario para dormir     HOLD UNTIL TOLD TO RESTART:  Cilostazol      All questions were answered.      Halima Thomas, Adrien  Clinical Pharmacist-BMT/Hematology  [facility]

## 2024-11-18 NOTE — PROGRESS NOTES
Section of Hematology and Stem Cell Transplantation    Post-Transplantation Follow Up Visit     11/18/2024    Transplant History:   Primary oncologist: Paco Hickey MD  Primary oncologic diagnosis: MDS  Transplant date: 9/19/2024  Donor: haploidentical  Blood Type (Patient): B +  Blood Type (Donor): A +  CMV (Patient): Positive  CMV (Donor): Positive  Graft source: Bone marrow  CD34+ cell dose: 3.55x10^6  Conditioning Regimen: Fludarabine plus melphalan 100 mg/m2 + 2Gy TBI  GVHD prophylaxis: Post-transplant cyclophosphamide, Tacrolimus, MMF  Immediate post-transplant complications: Patient experienced expected GI toxicities with C diff negative diarrhea and nausea, neutropenic fever with negative infectious work up, expected cytopenias requiring transfusions, electrolyte abnormalities requiring replacement, volume overload requiring diuresis, intermittent SOB from pulmonary edema requiring 02, and a hemorrhoid flare up. Diarrhea and SOB improved towards the end of the hospital stay.     History of Present Ilness:   Guillaume Salinas (Guillaume) is a pleasant 69 y.o.male with a past medical history of MDS who is status post haploidentical stem cell transplantation conditioned with FluMel 100 + PTCy+ 2Gy TBI who is currently day +60  who presents for post-transplant follow up. Offered  through language services, declined - wife and daughter assisted.      He is feeling very good today. He is more active and his appetite is improving. He has not had anymore episodes of diarrhea. He states his sleep and restless leg is much better. He is experiencing occasional dizziness when going from a sit to standing position. He is also having minor hesitancy with urination and occasional burning at end of stream, no hematuria. He denies nausea, rash, and fevers    PAST MEDICAL HISTORY:   Past Medical History:   Diagnosis Date    Anticoagulant long-term use     Coronary artery disease     Hypertension      Myelodysplastic syndrome     Peripheral vascular disease, unspecified        PAST SURGICAL HISTORY:   Past Surgical History:   Procedure Laterality Date    BONE MARROW BIOPSY Left 2023    Procedure: Biopsy-bone marrow;  Surgeon: Harry Diamond MD;  Location: [facility] OR;  Service: Oncology;  Laterality: Left;    COLONOSCOPY N/A 2022    Procedure: COLONOSCOPY Golytely Vaccinated will bring cards;  Surgeon: Dereje Simon MD;  Location: [facility] ENDO;  Service: Endoscopy;  Laterality: N/A;  Do not cancel this order    INSERTION OF LEMONS CATHETER Right 2024    Procedure: INSERTION, CATHETER, CENTRAL VENOUS, LEMONS TRIPLE LUMEN;  Surgeon: Kg Patten MD;  Location: [facility] OR [address] [place] [address];  Service: General;  Laterality: Right;     PAST SOCIAL HISTORY:  Social History     Socioeconomic History    Marital status:    Tobacco Use    Smoking status: Former     Current packs/day: 0.00     Average packs/day: 0.3 packs/day for 50.0 years (12.5 ttl pk-yrs)     Types: Cigarettes     Start date: 3/1/1973     Quit date: 3/1/2023     Years since quittin.7     Passive exposure: Past    Smokeless tobacco: Never   Substance and Sexual Activity    Alcohol use: Not Currently    Drug use: Never    Sexual activity: Not Currently     Partners: Female     Social Drivers of Health     Financial Resource Strain: Low Risk  (2024)    Overall Financial Resource Strain (CARDIA)     Difficulty of Paying Living Expenses: Not hard at all   Food Insecurity: No Food Insecurity (2024)    Hunger Vital Sign     Worried About Running Out of Food in the Last Year: Never true     Ran Out of Food in the Last Year: Never true   Transportation Needs: No Transportation Needs (2024)    TRANSPORTATION NEEDS     Transportation : No   Physical Activity: Insufficiently Active (2024)    Exercise Vital Sign     Days of Exercise per Week: 2 days     Minutes of Exercise per Session: 60 min   Stress: Stress Concern  Present (9/30/2024)    [facility] Occupational [facility] - Occupational Stress Questionnaire     Feeling of Stress : To some extent   Housing Stability: Low Risk  (9/30/2024)    Housing Stability Vital Sign     Unable to Pay for Housing in the Last Year: No     Homeless in the Last Year: No       FAMILY HISTORY:  Cancer-related family history includes Cancer in his brother and father.    CURRENT MEDICATIONS:   Medication List with Changes/Refills   New Medications    FOLIC ACID (FOLVITE) 1 MG TABLET    Take 1 tablet (1 mg total) by mouth once daily.   Current Medications    ACYCLOVIR (ZOVIRAX) 800 MG TAB    Take 1 tablet (800 mg total) by mouth 2 (two) times daily.    BUDESONIDE (ENTOCORT EC) 3 MG CAPSULE    Take 1 capsule (3 mg total) by mouth 3 (three) times daily.    CARVEDILOL (COREG) 6.25 MG TABLET    Take 1 tablet (6.25 mg total) by mouth 2 (two) times daily.    GABAPENTIN (NEURONTIN) 300 MG CAPSULE    Take 2 capsules (600 mg total) by mouth every evening.    LETERMOVIR (PREVYMIS) 480 MG TAB    Take 1 tablet (480 mg total) by mouth Daily.    LIDOCAINE (LIDODERM) 5 %    Place 1 patch onto the skin once daily. Remove & Discard patch within 12 hours or as directed by MD. Place patch to left shoulder.    LOPERAMIDE (IMODIUM) 2 MG CAPSULE    Take 1 capsule (2 mg total) by mouth 2 (two) times daily as needed for Diarrhea.    MAGNESIUM OXIDE (MAG-OX) 400 MG (241.3 MG MAGNESIUM) TABLET    Take 2 tablets (800 mg total) by mouth 2 (two) times daily.    ONDANSETRON (ZOFRAN-ODT) 8 MG TBDL    Take 1 tablet (8 mg total) by mouth every 8 (eight) hours as needed (nausea/vomiting).    PANTOPRAZOLE (PROTONIX) 40 MG TABLET    Take 1 tablet (40 mg total) by mouth once daily.    POSACONAZOLE (NOXAFIL) 100 MG TBEC TABLET    Take 3 tablets (300 mg total) by mouth once daily.    PREDNISONE (DELTASONE) 10 MG TABLET    Take 3 tablets (30 mg total) by mouth once daily.    PROCHLORPERAZINE (COMPAZINE) 5 MG TABLET    Take 1 tablet  (5 mg total) by mouth 4 (four) times daily as needed for Nausea.    ROPINIROLE (REQUIP) 0.5 MG TABLET    Take 1 tablet (0.5 mg total) by mouth every evening.    SULFAMETHOXAZOLE-TRIMETHOPRIM 800-160MG (BACTRIM DS) 800-160 MG TAB    Take 1 tablet by mouth every Mon, Wed, Fri. START 10/21/24    TACROLIMUS (PROGRAF) 0.5 MG CAP    Take 1 capsule (0.5 mg total) by mouth every morning AND 1 capsule (0.5 mg total) every evening.    TRAMADOL (ULTRAM) 50 MG TABLET    Take 1 tablet (50 mg total) by mouth every 6 (six) hours as needed for Pain.    ZOLPIDEM (AMBIEN) 5 MG TAB    Take 1 tablet (5 mg total) by mouth nightly as needed (insomnia).       ALLERGIES:   Review of patient's allergies indicates:  No Known Allergies    GVHD Review of Systems:     Pertinent positives and negatives included in the HPI. Otherwise a 14 point review of systems is negative. GVHD review of systems recorded in BMT flowsheet.     Physical Exam:     Vitals:    11/18/24 0828   BP: 116/74   Pulse: 72   Resp: 18       General: Appears well, NAD.   HEENT: MMM, no OP lesions  Pulmonary: CTAB, no increased work of breathing, no W/R/C  Cardiovascular: S1S2 normal, RRR, no M/R/G  Abdominal: Soft, NT, ND, BS+, no HSM  Extremities: No C/C/E  Neurological: AAOx4, grossly normal, no focal deficits  Dermatologic: No appreciable rashes or lesions    ECOG Performance Status: (foot note - ECOG PS provided by Eastern Cooperative Oncology Group) 1 - Symptomatic but completely ambulatory    Karnofsky Performance Score:  80%- Normal Activity with Effort: Some Symptoms of Disease    Labs:   Lab Results   Component Value Date    WBC 7.87 11/18/2024    HGB 11.9 (L) 11/18/2024    HCT 36.3 (L) 11/18/2024     (H) 11/18/2024    PLT 64 (L) 11/18/2024        CMP  Sodium   Date Value Ref Range Status   11/18/2024 139 136 - 145 mmol/L Final     Potassium   Date Value Ref Range Status   11/18/2024 4.4 3.5 - 5.1 mmol/L Final     Chloride   Date Value Ref Range Status    11/18/2024 104 95 - 110 mmol/L Final     CO2   Date Value Ref Range Status   11/18/2024 28 23 - 29 mmol/L Final     Glucose   Date Value Ref Range Status   11/18/2024 92 70 - 110 mg/dL Final     BUN   Date Value Ref Range Status   11/18/2024 22 8 - 23 mg/dL Final     Creatinine   Date Value Ref Range Status   11/18/2024 0.8 0.5 - 1.4 mg/dL Final     Calcium   Date Value Ref Range Status   11/18/2024 9.1 8.7 - 10.5 mg/dL Final     Total Protein   Date Value Ref Range Status   11/18/2024 5.5 (L) 6.0 - 8.4 g/dL Final     Albumin   Date Value Ref Range Status   11/18/2024 3.5 3.5 - 5.2 g/dL Final     Total Bilirubin   Date Value Ref Range Status   11/18/2024 0.6 0.1 - 1.0 mg/dL Final     Comment:     For infants and newborns, interpretation of results should be based  on gestational age, weight and in agreement with clinical  observations.    Premature Infant recommended reference ranges:  Up to 24 hours.............<8.0 mg/dL  Up to 48 hours............<12.0 mg/dL  3-5 days..................<15.0 mg/dL  6-29 days.................<15.0 mg/dL       Alkaline Phosphatase   Date Value Ref Range Status   11/18/2024 96 40 - 150 U/L Final     AST   Date Value Ref Range Status   11/18/2024 20 10 - 40 U/L Final     ALT   Date Value Ref Range Status   11/18/2024 50 (H) 10 - 44 U/L Final     Anion Gap   Date Value Ref Range Status   11/18/2024 7 (L) 8 - 16 mmol/L Final     eGFR   Date Value Ref Range Status   11/18/2024 >60.0 >60 mL/min/1.73 m^2 Final          Imaging:   Hospital imaging reviewed.    Pathology:  Prior pathology reviewed. Plan for day +30 bone marrow biopsy on 10/23/24    Acute GVHD Scoring:  GVHD Acute Assessment  Skin: No skin acute Lzjze-cbphzm-Xfms Disease/rash not attributable to acute Tmivf-dkthhm-Epto Disease  Upper Intestinal Tract: Stage 0 - No persistant nausea or vomitingNo GVHD at this time. Unable to record via flowsheets.  Liver: No liver acute Cybmk-kpjbqu-Nccp Disease/bilirubin level not  attributable to acute Fkqup-qhkyby-Ecfi Disease     Overall Grade (Przepiorka): 0 - No stage 1-4 of any organ.      Assessment and Plan:   Guillaume Salinas (Guillaume) is a pleasant 69 y.o.male with a past medical history of MDS s/p haplo SCT who presents for post-transplant follow up.    MDS: Status post treatment with azacitidine 75 mg/m2 daily x7 days plus venetoclax 100mg (voriconazole) daily x14 of 28 days.Venetoclax decreased to 7 days with cycle 3 until completion of 11 cycles. No plans for maintenance at this time.     Status post allogeneic stem cell transplantation: As noted above, status post haploidentical stem cell transplantation conditioned with FluMel 100 + PTCy+ 2Gy TBI . Currently Day+ 60. Engrafted on 10/09/24 day +20.  Day 30 bone marrow (11/5/2024) showing mildly hypercellular marrow with no increased blast (0.3%) and no evidence of myeloid neoplasm. Pending chimerisms, NGS, and CG  Graft versus host disease: GVHD prophylaxis with Post-transplant cyclophosphamide, Tacrolimus, MMF (MMF d/c on D+35). Persistent nausea despite anti-emetics, reducing pill burden. 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 He started budesonide 3mg TID on 10/28/24. Persistent nausea despite budesonide so started systemic steroids 11/1 at 0.5 mg/kg (currently 10 mg daily) and his steroid taper has been given to him.   Current tacro dose: 0.5 mg BID  Last tacro level: 7.6  Adjustments: no adjustments   Steroid tapering schedule is below  Date               Steroid Dose  11/04 - 11/10  Take 3 tablets (30 mg) by mouth once daily  11/11 - 11/17  Take 2 tablets (20 mg) by mouth once daily  11/18 - 11/24  Take 1 tablet (10 mg) by mouth once daily  11/25 - 12/01  Take ½ tablet (5 mg) by mouth once daily.  12/02 - 12/08  Take ½ tablet (5 mg) by mouth every other day   12/09   STOP     Immunosuppression: Prevention with posaconazole, acyclovir. CMV prophylaxis with letermovir. PJP prophyalxis Bactrim [facility].  Continue weekly monitoring of CMV and EBV.  Last CMV: Not-detected, last EBV: Not-detected.   Active infections: N/A    Pancytopenia: Due to underlying disease and chemotherapy. Transfuseu for Hgb <7 g/dL and platelets <10k.  Folic acid borderline low at 4.3, will start folic acid 1mg daily     Hypomagnesemia: Related to tacro, poor po intake. Normal today.   Continue MagOx to 800mg twice daily.      Chemotherapy Induced Nausea: Resolved with steroids. Tapering as above.    BK Virus: Patient with minor pain, frequency, and hesitancy with urination. He denies any blood. Patient was offered symptomatic support but denied due to not wanting to take another pill. Will continue hydration efforts.     Orthostatic Hypotension: Patient with minor dizziness when going from sitting to standing position. Educated patient to get up slowly while holding on to something and to continue fluid intake.      Anxiety, Restless Leg Syndrome: Noted since discharge by family. He started ropinirole 0.5mg and has experienced significant improvement.    Insomnia: Patient with significant improvement. Continue Ambien and Ropinirole   for RLS.     Follow up: Twice weekly follow up with labs.    Orders Placed:      Orders Placed This Encounter    folic acid (FOLVITE) 1 MG tablet     Medical Complexity:   Visit today included increased complexity associated with the care of the episodic problems addressed above and managing the longitudinal care of the patient due to the serious and/or complex managed problem(s) history of allo SCT.      Follow Up:      Twice weekly follow up as scheduled.     Sondra Jimenez PA-C  Malignant Hematology & Bone Marrow Transplant

## 2024-11-19 ENCOUNTER — PATIENT MESSAGE (OUTPATIENT)
Dept: HEMATOLOGY/ONCOLOGY | Facility: CLINIC | Age: 69
End: 2024-11-19
Payer: MEDICARE

## 2024-11-19 DIAGNOSIS — E61.0 COPPER DEFICIENCY: Primary | ICD-10-CM

## 2024-11-19 LAB
ABO + RH BLD: NORMAL
BLD GP AB SCN CELLS X3 SERPL QL: NORMAL
SPECIMEN OUTDATE: NORMAL

## 2024-11-19 RX ORDER — CALCIUM CARBONATE/VITAMIN D3 500-10/5ML
2 LIQUID (ML) ORAL DAILY
Qty: 90 CAPSULE | Refills: 0 | Status: SHIPPED | OUTPATIENT
Start: 2024-11-19 | End: 2025-02-17

## 2024-11-21 ENCOUNTER — INFUSION (OUTPATIENT)
Dept: INFUSION THERAPY | Facility: HOSPITAL | Age: 69
End: 2024-11-21
Attending: FAMILY MEDICINE
Payer: MEDICARE

## 2024-11-21 ENCOUNTER — DOCUMENTATION ONLY (OUTPATIENT)
Dept: HEMATOLOGY/ONCOLOGY | Facility: CLINIC | Age: 69
End: 2024-11-21
Payer: MEDICARE

## 2024-11-21 ENCOUNTER — OFFICE VISIT (OUTPATIENT)
Dept: HEMATOLOGY/ONCOLOGY | Facility: CLINIC | Age: 69
End: 2024-11-21
Payer: MEDICARE

## 2024-11-21 VITALS
DIASTOLIC BLOOD PRESSURE: 68 MMHG | BODY MASS INDEX: 20.49 KG/M2 | HEIGHT: 69 IN | WEIGHT: 138.31 LBS | HEART RATE: 73 BPM | RESPIRATION RATE: 18 BRPM | SYSTOLIC BLOOD PRESSURE: 130 MMHG | OXYGEN SATURATION: 99 %

## 2024-11-21 DIAGNOSIS — D46.9 MYELODYSPLASTIC SYNDROME: Primary | ICD-10-CM

## 2024-11-21 DIAGNOSIS — Z76.82 STEM CELL TRANSPLANT CANDIDATE: ICD-10-CM

## 2024-11-21 DIAGNOSIS — Z94.81 S/P ALLOGENEIC BONE MARROW TRANSPLANT: ICD-10-CM

## 2024-11-21 DIAGNOSIS — T45.1X5A ANEMIA DUE TO ANTINEOPLASTIC CHEMOTHERAPY: ICD-10-CM

## 2024-11-21 DIAGNOSIS — D64.81 ANEMIA DUE TO ANTINEOPLASTIC CHEMOTHERAPY: ICD-10-CM

## 2024-11-21 DIAGNOSIS — D69.6 THROMBOCYTOPENIA: ICD-10-CM

## 2024-11-21 LAB
ABO + RH BLD: NORMAL
ALBUMIN SERPL BCP-MCNC: 3.4 G/DL (ref 3.5–5.2)
ALP SERPL-CCNC: 91 U/L (ref 40–150)
ALT SERPL W/O P-5'-P-CCNC: 50 U/L (ref 10–44)
ANION GAP SERPL CALC-SCNC: 6 MMOL/L (ref 8–16)
AST SERPL-CCNC: 23 U/L (ref 10–40)
BASOPHILS # BLD AUTO: 0.01 K/UL (ref 0–0.2)
BASOPHILS NFR BLD: 0.1 % (ref 0–1.9)
BILIRUB SERPL-MCNC: 0.4 MG/DL (ref 0.1–1)
BLD GP AB SCN CELLS X3 SERPL QL: NORMAL
BUN SERPL-MCNC: 20 MG/DL (ref 8–23)
CALCIUM SERPL-MCNC: 9 MG/DL (ref 8.7–10.5)
CHLORIDE SERPL-SCNC: 105 MMOL/L (ref 95–110)
CMV DNA SPEC QL NAA+PROBE: NORMAL
CO2 SERPL-SCNC: 26 MMOL/L (ref 23–29)
CREAT SERPL-MCNC: 0.7 MG/DL (ref 0.5–1.4)
CYTOMEGALOVIRUS PCR, QUANT: NOT DETECTED IU/ML
DIFFERENTIAL METHOD BLD: ABNORMAL
EOSINOPHIL # BLD AUTO: 0.1 K/UL (ref 0–0.5)
EOSINOPHIL NFR BLD: 0.8 % (ref 0–8)
EPSTEIN-BARR VIRUS DNA: NORMAL
EPSTEIN-BARR VIRUS PCR, QUANT: NOT DETECTED IU/ML
ERYTHROCYTE [DISTWIDTH] IN BLOOD BY AUTOMATED COUNT: ABNORMAL % (ref 11.5–14.5)
EST. GFR  (NO RACE VARIABLE): >60 ML/MIN/1.73 M^2
GLUCOSE SERPL-MCNC: 100 MG/DL (ref 70–110)
HCT VFR BLD AUTO: 35.5 % (ref 40–54)
HGB BLD-MCNC: 11.5 G/DL (ref 14–18)
IMM GRANULOCYTES # BLD AUTO: 0.07 K/UL (ref 0–0.04)
IMM GRANULOCYTES NFR BLD AUTO: 0.9 % (ref 0–0.5)
LYMPHOCYTES # BLD AUTO: 0.4 K/UL (ref 1–4.8)
LYMPHOCYTES NFR BLD: 4.7 % (ref 18–48)
MAGNESIUM SERPL-MCNC: 2 MG/DL (ref 1.6–2.6)
MCH RBC QN AUTO: 35.1 PG (ref 27–31)
MCHC RBC AUTO-ENTMCNC: 32.4 G/DL (ref 32–36)
MCV RBC AUTO: 108 FL (ref 82–98)
MONOCYTES # BLD AUTO: 0.4 K/UL (ref 0.3–1)
MONOCYTES NFR BLD: 5.7 % (ref 4–15)
NEUTROPHILS # BLD AUTO: 6.5 K/UL (ref 1.8–7.7)
NEUTROPHILS NFR BLD: 87.8 % (ref 38–73)
NRBC BLD-RTO: 0 /100 WBC
PHOSPHATE SERPL-MCNC: 2.8 MG/DL (ref 2.7–4.5)
PLATELET # BLD AUTO: 65 K/UL (ref 150–450)
PMV BLD AUTO: 12.1 FL (ref 9.2–12.9)
POTASSIUM SERPL-SCNC: 4.3 MMOL/L (ref 3.5–5.1)
PROT SERPL-MCNC: 5.3 G/DL (ref 6–8.4)
RBC # BLD AUTO: 3.28 M/UL (ref 4.6–6.2)
SODIUM SERPL-SCNC: 137 MMOL/L (ref 136–145)
SPECIMEN OUTDATE: NORMAL
TACROLIMUS BLD-MCNC: 7.6 NG/ML (ref 5–15)
WBC # BLD AUTO: 7.41 K/UL (ref 3.9–12.7)

## 2024-11-21 PROCEDURE — 87799 DETECT AGENT NOS DNA QUANT: CPT | Performed by: INTERNAL MEDICINE

## 2024-11-21 PROCEDURE — 99999 PR PBB SHADOW E&M-EST. PATIENT-LVL IV: CPT | Mod: PBBFAC,,, | Performed by: INTERNAL MEDICINE

## 2024-11-21 PROCEDURE — 25000003 PHARM REV CODE 250: Performed by: INTERNAL MEDICINE

## 2024-11-21 PROCEDURE — A4216 STERILE WATER/SALINE, 10 ML: HCPCS | Performed by: INTERNAL MEDICINE

## 2024-11-21 PROCEDURE — 83735 ASSAY OF MAGNESIUM: CPT | Performed by: INTERNAL MEDICINE

## 2024-11-21 PROCEDURE — 99215 OFFICE O/P EST HI 40 MIN: CPT | Mod: GC,S$GLB,, | Performed by: INTERNAL MEDICINE

## 2024-11-21 PROCEDURE — 80053 COMPREHEN METABOLIC PANEL: CPT | Performed by: INTERNAL MEDICINE

## 2024-11-21 PROCEDURE — 3078F DIAST BP <80 MM HG: CPT | Mod: CPTII,S$GLB,, | Performed by: INTERNAL MEDICINE

## 2024-11-21 PROCEDURE — 1159F MED LIST DOCD IN RCRD: CPT | Mod: CPTII,S$GLB,, | Performed by: INTERNAL MEDICINE

## 2024-11-21 PROCEDURE — 84100 ASSAY OF PHOSPHORUS: CPT | Performed by: INTERNAL MEDICINE

## 2024-11-21 PROCEDURE — 3008F BODY MASS INDEX DOCD: CPT | Mod: CPTII,S$GLB,, | Performed by: INTERNAL MEDICINE

## 2024-11-21 PROCEDURE — 36592 COLLECT BLOOD FROM PICC: CPT

## 2024-11-21 PROCEDURE — 63600175 PHARM REV CODE 636 W HCPCS: Performed by: INTERNAL MEDICINE

## 2024-11-21 PROCEDURE — 85025 COMPLETE CBC W/AUTO DIFF WBC: CPT | Performed by: INTERNAL MEDICINE

## 2024-11-21 PROCEDURE — 3075F SYST BP GE 130 - 139MM HG: CPT | Mod: CPTII,S$GLB,, | Performed by: INTERNAL MEDICINE

## 2024-11-21 PROCEDURE — 1126F AMNT PAIN NOTED NONE PRSNT: CPT | Mod: CPTII,S$GLB,, | Performed by: INTERNAL MEDICINE

## 2024-11-21 PROCEDURE — 86901 BLOOD TYPING SEROLOGIC RH(D): CPT | Performed by: INTERNAL MEDICINE

## 2024-11-21 PROCEDURE — 3288F FALL RISK ASSESSMENT DOCD: CPT | Mod: CPTII,S$GLB,, | Performed by: INTERNAL MEDICINE

## 2024-11-21 PROCEDURE — 1101F PT FALLS ASSESS-DOCD LE1/YR: CPT | Mod: CPTII,S$GLB,, | Performed by: INTERNAL MEDICINE

## 2024-11-21 PROCEDURE — 96523 IRRIG DRUG DELIVERY DEVICE: CPT

## 2024-11-21 PROCEDURE — 80197 ASSAY OF TACROLIMUS: CPT | Performed by: INTERNAL MEDICINE

## 2024-11-21 RX ORDER — SODIUM CHLORIDE 0.9 % (FLUSH) 0.9 %
10 SYRINGE (ML) INJECTION
Status: DISCONTINUED | OUTPATIENT
Start: 2024-11-21 | End: 2024-11-21 | Stop reason: HOSPADM

## 2024-11-21 RX ORDER — HEPARIN 100 UNIT/ML
500 SYRINGE INTRAVENOUS
Status: DISCONTINUED | OUTPATIENT
Start: 2024-11-21 | End: 2024-11-21 | Stop reason: HOSPADM

## 2024-11-21 RX ORDER — HEPARIN 100 UNIT/ML
500 SYRINGE INTRAVENOUS
OUTPATIENT
Start: 2024-11-21

## 2024-11-21 RX ORDER — SODIUM CHLORIDE 0.9 % (FLUSH) 0.9 %
10 SYRINGE (ML) INJECTION
OUTPATIENT
Start: 2024-11-21

## 2024-11-21 RX ADMIN — HEPARIN 500 UNITS: 100 SYRINGE at 08:11

## 2024-11-21 RX ADMIN — Medication 10 ML: at 08:11

## 2024-11-21 NOTE — PROGRESS NOTES
Section of Hematology and Stem Cell Transplantation    Post-Transplantation Follow Up Visit     11/21/2024    Transplant History:   Primary oncologist: Paco Hickey MD  Primary oncologic diagnosis: MDS  Transplant date: 9/19/2024  Donor: haploidentical  Blood Type (Patient): B +  Blood Type (Donor): A +  CMV (Patient): Positive  CMV (Donor): Positive  Graft source: Bone marrow  CD34+ cell dose: 3.55x10^6  Conditioning Regimen: Fludarabine plus melphalan 100 mg/m2 + 2Gy TBI  GVHD prophylaxis: Post-transplant cyclophosphamide, Tacrolimus, MMF  Immediate post-transplant complications: Patient experienced expected GI toxicities with C diff negative diarrhea and nausea, neutropenic fever with negative infectious work up, expected cytopenias requiring transfusions, electrolyte abnormalities requiring replacement, volume overload requiring diuresis, intermittent SOB from pulmonary edema requiring 02, and a hemorrhoid flare up. Diarrhea and SOB improved towards the end of the hospital stay.     History of Present Ilness:   Guillaume Salinas (Guillaume) is a pleasant 69 y.o.male with a past medical history of MDS who is status post haploidentical stem cell transplantation conditioned with FluMel 100 + PTCy+ 2Gy TBI who is currently day +63  who presents for post-transplant follow up. Offered  through language services, declined - wife and daughter assisted.     Interval History: Patient presents for routine follow-up. He reports doing well with improved appetite with him eating three full meals daily with minimal nausea. Of note, he does report some continued fatigue at the end of each day but is trying to escalate his activities as tolerated. His diarrhea has completely resolved with full compliance with his prednisone taper. He is accompanied by his wife and daughter via phone who requested to translate.     PAST MEDICAL HISTORY:   Past Medical History:   Diagnosis Date    Anticoagulant long-term use      Coronary artery disease     Hypertension     Myelodysplastic syndrome     Peripheral vascular disease, unspecified        PAST SURGICAL HISTORY:   Past Surgical History:   Procedure Laterality Date    BONE MARROW BIOPSY Left 2023    Procedure: Biopsy-bone marrow;  Surgeon: Harry Diamond MD;  Location: [facility] OR;  Service: Oncology;  Laterality: Left;    COLONOSCOPY N/A 2022    Procedure: COLONOSCOPY Golytely Vaccinated will bring cards;  Surgeon: Dereje Simon MD;  Location: [facility] ENDO;  Service: Endoscopy;  Laterality: N/A;  Do not cancel this order    INSERTION OF LEMONS CATHETER Right 2024    Procedure: INSERTION, CATHETER, CENTRAL VENOUS, LEMONS TRIPLE LUMEN;  Surgeon: Kg Patten MD;  Location: [address];  Service: General;  Laterality: Right;     PAST SOCIAL HISTORY:  Social History     Socioeconomic History    Marital status:    Tobacco Use    Smoking status: Former     Current packs/day: 0.00     Average packs/day: 0.3 packs/day for 50.0 years (12.5 ttl pk-yrs)     Types: Cigarettes     Start date: 3/1/1973     Quit date: 3/1/2023     Years since quittin.7     Passive exposure: Past    Smokeless tobacco: Never   Substance and Sexual Activity    Alcohol use: Not Currently    Drug use: Never    Sexual activity: Not Currently     Partners: Female     Social Drivers of Health     Financial Resource Strain: Low Risk  (2024)    Overall Financial Resource Strain (CARDIA)     Difficulty of Paying Living Expenses: Not hard at all   Food Insecurity: No Food Insecurity (2024)    Hunger Vital Sign     Worried About Running Out of Food in the Last Year: Never true     Ran Out of Food in the Last Year: Never true   Transportation Needs: No Transportation Needs (2024)    TRANSPORTATION NEEDS     Transportation : No   Physical Activity: Insufficiently Active (2024)    Exercise Vital Sign     Days of Exercise per Week: 2 days     Minutes of Exercise  per Session: 60 min   Stress: Stress Concern Present (9/30/2024)    [employer] - Occupational Stress Questionnaire     Feeling of Stress : To some extent   Housing Stability: Low Risk  (9/30/2024)    Housing Stability Vital Sign     Unable to Pay for Housing in the Last Year: No     Homeless in the Last Year: No       FAMILY HISTORY:  Cancer-related family history includes Cancer in his brother and father.    CURRENT MEDICATIONS:   Medication List with Changes/Refills   Current Medications    ACYCLOVIR (ZOVIRAX) 800 MG TAB    Take 1 tablet (800 mg total) by mouth 2 (two) times daily.    BUDESONIDE (ENTOCORT EC) 3 MG CAPSULE    Take 1 capsule (3 mg total) by mouth 3 (three) times daily.    CARVEDILOL (COREG) 6.25 MG TABLET    Take 1 tablet (6.25 mg total) by mouth 2 (two) times daily.    COPPER GLUCONATE 2 MG CAP    Take 2 mg by mouth once daily.    FOLIC ACID (FOLVITE) 1 MG TABLET    Take 1 tablet (1 mg total) by mouth once daily.    GABAPENTIN (NEURONTIN) 300 MG CAPSULE    Take 2 capsules (600 mg total) by mouth every evening.    LETERMOVIR (PREVYMIS) 480 MG TAB    Take 1 tablet (480 mg total) by mouth Daily.    LIDOCAINE (LIDODERM) 5 %    Place 1 patch onto the skin once daily. Remove & Discard patch within 12 hours or as directed by MD. Place patch to left shoulder.    LOPERAMIDE (IMODIUM) 2 MG CAPSULE    Take 1 capsule (2 mg total) by mouth 2 (two) times daily as needed for Diarrhea.    MAGNESIUM OXIDE (MAG-OX) 400 MG (241.3 MG MAGNESIUM) TABLET    Take 2 tablets (800 mg total) by mouth 2 (two) times daily.    ONDANSETRON (ZOFRAN-ODT) 8 MG TBDL    Take 1 tablet (8 mg total) by mouth every 8 (eight) hours as needed (nausea/vomiting).    PANTOPRAZOLE (PROTONIX) 40 MG TABLET    Take 1 tablet (40 mg total) by mouth once daily.    POSACONAZOLE (NOXAFIL) 100 MG TBEC TABLET    Take 3 tablets (300 mg total) by mouth once daily.    PREDNISONE (DELTASONE) 10 MG TABLET    Take 3 tablets (30 mg  total) by mouth once daily.    PROCHLORPERAZINE (COMPAZINE) 5 MG TABLET    Take 1 tablet (5 mg total) by mouth 4 (four) times daily as needed for Nausea.    ROPINIROLE (REQUIP) 0.5 MG TABLET    Take 1 tablet (0.5 mg total) by mouth every evening.    SULFAMETHOXAZOLE-TRIMETHOPRIM 800-160MG (BACTRIM DS) 800-160 MG TAB    Take 1 tablet by mouth every Mon, Wed, Fri. START 10/21/24    TACROLIMUS (PROGRAF) 0.5 MG CAP    Take 1 capsule (0.5 mg total) by mouth every morning AND 1 capsule (0.5 mg total) every evening.    TRAMADOL (ULTRAM) 50 MG TABLET    Take 1 tablet (50 mg total) by mouth every 6 (six) hours as needed for Pain.    ZOLPIDEM (AMBIEN) 5 MG TAB    Take 1 tablet (5 mg total) by mouth nightly as needed (insomnia).       ALLERGIES:   Review of patient's allergies indicates:  No Known Allergies    GVHD Review of Systems:     Pertinent positives and negatives included in the HPI. Otherwise a 14 point review of systems is negative. GVHD review of systems recorded in BMT flowsheet.     Physical Exam:     Vitals:    11/21/24 0900   BP: 130/68   Pulse: 73   Resp: 18       General: Appears well, NAD.   HEENT: MMM, no OP lesions  Pulmonary: CTAB, no increased work of breathing, no W/R/C  Cardiovascular: S1S2 normal, RRR, no M/R/G  Abdominal: Soft, NT, ND, BS+, no HSM  Extremities: No C/C/E  Neurological: AAOx4, grossly normal, no focal deficits  Dermatologic: No appreciable rashes or lesions    ECOG Performance Status: (foot note - ECOG PS provided by [employer] Cooperative Oncology Group) 1 - Symptomatic but completely ambulatory    Karnofsky Performance Score:  80%- Normal Activity with Effort: Some Symptoms of Disease    Labs:   Lab Results   Component Value Date    WBC 7.87 11/18/2024    HGB 11.9 (L) 11/18/2024    HCT 36.3 (L) 11/18/2024     (H) 11/18/2024    PLT 64 (L) 11/18/2024        CMP  Sodium   Date Value Ref Range Status   11/18/2024 139 136 - 145 mmol/L Final     Potassium   Date Value Ref Range Status    11/18/2024 4.4 3.5 - 5.1 mmol/L Final     Chloride   Date Value Ref Range Status   11/18/2024 104 95 - 110 mmol/L Final     CO2   Date Value Ref Range Status   11/18/2024 28 23 - 29 mmol/L Final     Glucose   Date Value Ref Range Status   11/18/2024 92 70 - 110 mg/dL Final     BUN   Date Value Ref Range Status   11/18/2024 22 8 - 23 mg/dL Final     Creatinine   Date Value Ref Range Status   11/18/2024 0.8 0.5 - 1.4 mg/dL Final     Calcium   Date Value Ref Range Status   11/18/2024 9.1 8.7 - 10.5 mg/dL Final     Total Protein   Date Value Ref Range Status   11/18/2024 5.5 (L) 6.0 - 8.4 g/dL Final     Albumin   Date Value Ref Range Status   11/18/2024 3.5 3.5 - 5.2 g/dL Final     Total Bilirubin   Date Value Ref Range Status   11/18/2024 0.6 0.1 - 1.0 mg/dL Final     Comment:     For infants and newborns, interpretation of results should be based  on gestational age, weight and in agreement with clinical  observations.    Premature Infant recommended reference ranges:  Up to 24 hours.............<8.0 mg/dL  Up to 48 hours............<12.0 mg/dL  3-5 days..................<15.0 mg/dL  6-29 days.................<15.0 mg/dL       Alkaline Phosphatase   Date Value Ref Range Status   11/18/2024 96 40 - 150 U/L Final     AST   Date Value Ref Range Status   11/18/2024 20 10 - 40 U/L Final     ALT   Date Value Ref Range Status   11/18/2024 50 (H) 10 - 44 U/L Final     Anion Gap   Date Value Ref Range Status   11/18/2024 7 (L) 8 - 16 mmol/L Final     eGFR   Date Value Ref Range Status   11/18/2024 >60.0 >60 mL/min/1.73 m^2 Final          Imaging:   Hospital imaging reviewed.    Pathology:  Prior pathology reviewed. Plan for day +30 bone marrow biopsy on 10/23/24    Acute GVHD Scoring:  GVHD Acute Assessment      No GVHD at this time. Unable to record via flowsheets.               Assessment and Plan:   Guillaume AmayaBetsy Brad (Guillaume) is a pleasant 69 y.o.male with a past medical history of MDS s/p haplo SCT who presents  for post-transplant follow up.    MDS: Status post treatment with azacitidine 75 mg/m2 daily x7 days plus venetoclax 100mg (voriconazole) daily x14 of 28 days.Venetoclax decreased to 7 days with cycle 3 until completion of 11 cycles. No plans for maintenance at this time.     Status post allogeneic stem cell transplantation: As noted above, status post haploidentical stem cell transplantation conditioned with FluMel 100 + PTCy+ 2Gy TBI . Currently Day+ 63. Engrafted on 10/09/24 day +20.  Day 30 bone marrow (11/5/2024) showing mildly hypercellular marrow with no increased blast (0.3%) and no evidence of myeloid neoplasm. Pending chimerisms, NGS, and CG  Graft versus host disease: GVHD prophylaxis with Post-transplant cyclophosphamide, Tacrolimus, MMF (MMF d/c on D+35). Persistent nausea despite anti-emetics, reducing pill burden. 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 He started budesonide 3mg TID on 10/28/24. Persistent nausea despite budesonide so started systemic steroids 11/1 at 0.5 mg/kg (currently 10 mg daily) and his steroid taper has been given to him.   Current tacro dose: 0.5 mg BID  Last tacro level: 7.6, 11/21/24 level pending  Adjustments: no adjustments   Steroid tapering schedule is below  Date               Steroid Dose  11/04 - 11/10  Take 3 tablets (30 mg) by mouth once daily  11/11 - 11/17  Take 2 tablets (20 mg) by mouth once daily  11/18 - 11/24  Take 1 tablet (10 mg) by mouth once daily  11/25 - 12/01  Take ½ tablet (5 mg) by mouth once daily.  12/02 - 12/08  Take ½ tablet (5 mg) by mouth every other day   12/09   STOP     Immunosuppression: Prevention with posaconazole, acyclovir. CMV prophylaxis with letermovir. PJP prophyalxis Bactrim [facility]. Continue weekly monitoring of CMV and EBV.  Last CMV: Not-detected, last EBV: Not-detected.   Active infections: N/A    Pancytopenia: Due to underlying disease and chemotherapy. Transfuseu for Hgb <7 g/dL and platelets <10k.  Folic acid  borderline low at 4.3, will start folic acid 1mg daily   Copper level of 635, plans to start daily OTC Copper supplement    Hypomagnesemia: Related to tacro, poor po intake. Normal today.   Continue MagOx to 800mg twice daily.      Chemotherapy Induced Nausea: Resolved with steroids. Tapering as above.    BK Virus: Patient with minor pain, frequency, and hesitancy with urination. He denies any blood. Patient was offered symptomatic support but denied due to not wanting to take another pill. Will continue hydration efforts.     Orthostatic Hypotension: Resolved since last visit with no reported episodes. Educated patient to get up slowly while holding on to something and to continue fluid intake.      Anxiety, Restless Leg Syndrome: Noted since discharge by family. He started ropinirole 0.5mg and has experienced significant improvement.    Insomnia: Patient with significant improvement. Continue Ambien and Ropinirole   for RLS.     Follow up: Twice weekly follow up with labs.    Orders Placed:           Medical Complexity:   Visit today included increased complexity associated with the care of the episodic problems addressed above and managing the longitudinal care of the patient due to the serious and/or complex managed problem(s) history of allo SCT.      Follow Up:      Twice weekly follow up as scheduled.       Monty Henderson D.O.  Hematology/Oncology Fellow, PGY-VI

## 2024-11-25 ENCOUNTER — CLINICAL SUPPORT (OUTPATIENT)
Dept: HEMATOLOGY/ONCOLOGY | Facility: CLINIC | Age: 69
End: 2024-11-25
Payer: MEDICARE

## 2024-11-25 ENCOUNTER — DOCUMENTATION ONLY (OUTPATIENT)
Dept: HEMATOLOGY/ONCOLOGY | Facility: CLINIC | Age: 69
End: 2024-11-25
Payer: MEDICARE

## 2024-11-25 ENCOUNTER — INFUSION (OUTPATIENT)
Dept: INFUSION THERAPY | Facility: HOSPITAL | Age: 69
End: 2024-11-25
Attending: FAMILY MEDICINE
Payer: MEDICARE

## 2024-11-25 ENCOUNTER — OFFICE VISIT (OUTPATIENT)
Dept: HEMATOLOGY/ONCOLOGY | Facility: CLINIC | Age: 69
End: 2024-11-25
Payer: MEDICARE

## 2024-11-25 VITALS
WEIGHT: 137.88 LBS | RESPIRATION RATE: 18 BRPM | BODY MASS INDEX: 20.42 KG/M2 | TEMPERATURE: 98 F | HEART RATE: 81 BPM | HEIGHT: 69 IN | OXYGEN SATURATION: 99 % | DIASTOLIC BLOOD PRESSURE: 68 MMHG | SYSTOLIC BLOOD PRESSURE: 119 MMHG

## 2024-11-25 DIAGNOSIS — T45.1X5A ANEMIA DUE TO ANTINEOPLASTIC CHEMOTHERAPY: ICD-10-CM

## 2024-11-25 DIAGNOSIS — D46.9 MYELODYSPLASTIC SYNDROME: Primary | ICD-10-CM

## 2024-11-25 DIAGNOSIS — D64.81 ANEMIA DUE TO ANTINEOPLASTIC CHEMOTHERAPY: ICD-10-CM

## 2024-11-25 DIAGNOSIS — Z94.81 S/P ALLOGENEIC BONE MARROW TRANSPLANT: Primary | ICD-10-CM

## 2024-11-25 DIAGNOSIS — Z76.82 STEM CELL TRANSPLANT CANDIDATE: ICD-10-CM

## 2024-11-25 DIAGNOSIS — D46.9 MYELODYSPLASTIC SYNDROME: ICD-10-CM

## 2024-11-25 DIAGNOSIS — G47.00 INSOMNIA, UNSPECIFIED TYPE: ICD-10-CM

## 2024-11-25 DIAGNOSIS — D69.6 THROMBOCYTOPENIA: ICD-10-CM

## 2024-11-25 LAB
ABO + RH BLD: NORMAL
ALBUMIN SERPL BCP-MCNC: 3.3 G/DL (ref 3.5–5.2)
ALP SERPL-CCNC: 79 U/L (ref 40–150)
ALT SERPL W/O P-5'-P-CCNC: 58 U/L (ref 10–44)
ANION GAP SERPL CALC-SCNC: 5 MMOL/L (ref 8–16)
AST SERPL-CCNC: 26 U/L (ref 10–40)
BASOPHILS # BLD AUTO: 0 K/UL (ref 0–0.2)
BASOPHILS NFR BLD: 0 % (ref 0–1.9)
BILIRUB SERPL-MCNC: 0.5 MG/DL (ref 0.1–1)
BLD GP AB SCN CELLS X3 SERPL QL: NORMAL
BUN SERPL-MCNC: 18 MG/DL (ref 8–23)
CALCIUM SERPL-MCNC: 8.5 MG/DL (ref 8.7–10.5)
CHLORIDE SERPL-SCNC: 107 MMOL/L (ref 95–110)
CO2 SERPL-SCNC: 28 MMOL/L (ref 23–29)
CREAT SERPL-MCNC: 0.7 MG/DL (ref 0.5–1.4)
DIFFERENTIAL METHOD BLD: ABNORMAL
EOSINOPHIL # BLD AUTO: 0.1 K/UL (ref 0–0.5)
EOSINOPHIL NFR BLD: 1.4 % (ref 0–8)
ERYTHROCYTE [DISTWIDTH] IN BLOOD BY AUTOMATED COUNT: ABNORMAL % (ref 11.5–14.5)
EST. GFR  (NO RACE VARIABLE): >60 ML/MIN/1.73 M^2
GLUCOSE SERPL-MCNC: 102 MG/DL (ref 70–110)
HCT VFR BLD AUTO: 34.8 % (ref 40–54)
HGB BLD-MCNC: 11.6 G/DL (ref 14–18)
IMM GRANULOCYTES # BLD AUTO: 0.03 K/UL (ref 0–0.04)
IMM GRANULOCYTES NFR BLD AUTO: 0.6 % (ref 0–0.5)
LYMPHOCYTES # BLD AUTO: 0.2 K/UL (ref 1–4.8)
LYMPHOCYTES NFR BLD: 3.5 % (ref 18–48)
MAGNESIUM SERPL-MCNC: 1.8 MG/DL (ref 1.6–2.6)
MCH RBC QN AUTO: 35.6 PG (ref 27–31)
MCHC RBC AUTO-ENTMCNC: 33.3 G/DL (ref 32–36)
MCV RBC AUTO: 107 FL (ref 82–98)
MONOCYTES # BLD AUTO: 0.3 K/UL (ref 0.3–1)
MONOCYTES NFR BLD: 5.5 % (ref 4–15)
NEUTROPHILS # BLD AUTO: 4.6 K/UL (ref 1.8–7.7)
NEUTROPHILS NFR BLD: 89 % (ref 38–73)
NRBC BLD-RTO: 0 /100 WBC
PHOSPHATE SERPL-MCNC: 3.4 MG/DL (ref 2.7–4.5)
PLATELET # BLD AUTO: 46 K/UL (ref 150–450)
PMV BLD AUTO: 11.6 FL (ref 9.2–12.9)
POTASSIUM SERPL-SCNC: 3.9 MMOL/L (ref 3.5–5.1)
PROT SERPL-MCNC: 5.1 G/DL (ref 6–8.4)
RBC # BLD AUTO: 3.26 M/UL (ref 4.6–6.2)
SODIUM SERPL-SCNC: 140 MMOL/L (ref 136–145)
SPECIMEN OUTDATE: NORMAL
TACROLIMUS BLD-MCNC: 8 NG/ML (ref 5–15)
WBC # BLD AUTO: 5.12 K/UL (ref 3.9–12.7)

## 2024-11-25 PROCEDURE — 36592 COLLECT BLOOD FROM PICC: CPT

## 2024-11-25 PROCEDURE — 85025 COMPLETE CBC W/AUTO DIFF WBC: CPT | Performed by: INTERNAL MEDICINE

## 2024-11-25 PROCEDURE — 99999 PR PBB SHADOW E&M-EST. PATIENT-LVL II: CPT | Mod: PBBFAC,,,

## 2024-11-25 PROCEDURE — 83735 ASSAY OF MAGNESIUM: CPT | Performed by: INTERNAL MEDICINE

## 2024-11-25 PROCEDURE — 25000003 PHARM REV CODE 250: Performed by: INTERNAL MEDICINE

## 2024-11-25 PROCEDURE — 84100 ASSAY OF PHOSPHORUS: CPT | Performed by: INTERNAL MEDICINE

## 2024-11-25 PROCEDURE — A4216 STERILE WATER/SALINE, 10 ML: HCPCS | Performed by: INTERNAL MEDICINE

## 2024-11-25 PROCEDURE — 63600175 PHARM REV CODE 636 W HCPCS: Performed by: INTERNAL MEDICINE

## 2024-11-25 PROCEDURE — 99999 PR PBB SHADOW E&M-EST. PATIENT-LVL IV: CPT | Mod: PBBFAC,,, | Performed by: INTERNAL MEDICINE

## 2024-11-25 PROCEDURE — 87799 DETECT AGENT NOS DNA QUANT: CPT | Performed by: INTERNAL MEDICINE

## 2024-11-25 PROCEDURE — 80053 COMPREHEN METABOLIC PANEL: CPT | Performed by: INTERNAL MEDICINE

## 2024-11-25 PROCEDURE — 86850 RBC ANTIBODY SCREEN: CPT | Performed by: INTERNAL MEDICINE

## 2024-11-25 PROCEDURE — 80197 ASSAY OF TACROLIMUS: CPT | Performed by: INTERNAL MEDICINE

## 2024-11-25 RX ORDER — ZOLPIDEM TARTRATE 5 MG/1
5 TABLET ORAL NIGHTLY PRN
Qty: 30 TABLET | Refills: 0 | Status: SHIPPED | OUTPATIENT
Start: 2024-11-25

## 2024-11-25 RX ORDER — SODIUM CHLORIDE 0.9 % (FLUSH) 0.9 %
10 SYRINGE (ML) INJECTION
Status: CANCELLED | OUTPATIENT
Start: 2024-11-25

## 2024-11-25 RX ORDER — SODIUM CHLORIDE 0.9 % (FLUSH) 0.9 %
10 SYRINGE (ML) INJECTION
Status: DISCONTINUED | OUTPATIENT
Start: 2024-11-25 | End: 2024-11-25 | Stop reason: HOSPADM

## 2024-11-25 RX ORDER — HEPARIN 100 UNIT/ML
500 SYRINGE INTRAVENOUS
Status: DISCONTINUED | OUTPATIENT
Start: 2024-11-25 | End: 2024-11-25 | Stop reason: HOSPADM

## 2024-11-25 RX ORDER — HEPARIN 100 UNIT/ML
500 SYRINGE INTRAVENOUS
Status: CANCELLED | OUTPATIENT
Start: 2024-11-25

## 2024-11-25 RX ADMIN — HEPARIN 500 UNITS: 100 SYRINGE at 08:11

## 2024-11-25 RX ADMIN — Medication 10 ML: at 08:11

## 2024-11-25 NOTE — PROGRESS NOTES
The patient's Prevymis will ship out today with expected delivery Tuesday. The patient has one more refill before the prescription expires on 12/31/2024.

## 2024-11-25 NOTE — PROGRESS NOTES
Section of Hematology and Stem Cell Transplantation    Post-Transplantation Follow Up Visit     11/25/2024    Transplant History:   Primary oncologist: Paco Hickey MD  Primary oncologic diagnosis: MDS  Transplant date: 9/19/2024  Donor: haploidentical  Blood Type (Patient): B +  Blood Type (Donor): A +  CMV (Patient): Positive  CMV (Donor): Positive  Graft source: Bone marrow  CD34+ cell dose: 3.55x10^6  Conditioning Regimen: Fludarabine plus melphalan 100 mg/m2 + 2Gy TBI  GVHD prophylaxis: Post-transplant cyclophosphamide, Tacrolimus, MMF  Immediate post-transplant complications: Patient experienced expected GI toxicities with C diff negative diarrhea and nausea, neutropenic fever with negative infectious work up, expected cytopenias requiring transfusions, electrolyte abnormalities requiring replacement, volume overload requiring diuresis, intermittent SOB from pulmonary edema requiring 02, and a hemorrhoid flare up. Diarrhea and SOB improved towards the end of the hospital stay.     History of Present Ilness:   Guillaume Salinas (Guillaume) is a pleasant 69 y.o.male with a past medical history of MDS who is status post haploidentical stem cell transplantation conditioned with FluMel 100 + PTCy+ 2Gy TBI who is currently day +67  who presents for post-transplant follow up. Offered  through language services, declined - wife and daughter assisted.     Interval History: Patient presents for routine follow-up. He reports doing well with improved appetite with him eating three full meals daily with minimal nausea. No bleeding/bruising. No rash. No pruritis. No nausea/vomiting. No diarrhea.     PAST MEDICAL HISTORY:   Past Medical History:   Diagnosis Date    Anticoagulant long-term use     Coronary artery disease     Hypertension     Myelodysplastic syndrome     Peripheral vascular disease, unspecified        PAST SURGICAL HISTORY:   Past Surgical History:   Procedure Laterality Date    BONE MARROW  BIOPSY Left 2023    Procedure: Biopsy-bone marrow;  Surgeon: Harry Diamond MD;  Location: [facility] OR;  Service: Oncology;  Laterality: Left;    COLONOSCOPY N/A 2022    Procedure: COLONOSCOPY Golytely Vaccinated will bring cards;  Surgeon: Dereje Simon MD;  Location: [facility] ENDO;  Service: Endoscopy;  Laterality: N/A;  Do not cancel this order    INSERTION OF LEMONS CATHETER Right 2024    Procedure: INSERTION, CATHETER, CENTRAL VENOUS, LEMONS TRIPLE LUMEN;  Surgeon: Kg Patten MD;  [facility];  Service: General;  Laterality: Right;     PAST SOCIAL HISTORY:  Social History     Socioeconomic History    Marital status:    Tobacco Use    Smoking status: Former     Current packs/day: 0.00     Average packs/day: 0.3 packs/day for 50.0 years (12.5 ttl pk-yrs)     Types: Cigarettes     Start date: 3/1/1973     Quit date: 3/1/2023     Years since quittin.7     Passive exposure: Past    Smokeless tobacco: Never   Substance and Sexual Activity    Alcohol use: Not Currently    Drug use: Never    Sexual activity: Not Currently     Partners: Female     Social Drivers of Health     Financial Resource Strain: Low Risk  (2024)    Overall Financial Resource Strain (CARDIA)     Difficulty of Paying Living Expenses: Not hard at all   Food Insecurity: No Food Insecurity (2024)    Hunger Vital Sign     Worried About Running Out of Food in the Last Year: Never true     Ran Out of Food in the Last Year: Never true   Transportation Needs: No Transportation Needs (2024)    TRANSPORTATION NEEDS     Transportation : No   Physical Activity: Insufficiently Active (2024)    Exercise Vital Sign     Days of Exercise per Week: 2 days     Minutes of Exercise per Session: 60 min   Stress: Stress Concern Present (2024)    [employer] - Occupational Stress Questionnaire     Feeling of Stress : To some extent   Housing Stability: Low Risk   (9/30/2024)    Housing Stability Vital Sign     Unable to Pay for Housing in the Last Year: No     Homeless in the Last Year: No       FAMILY HISTORY:  Cancer-related family history includes Cancer in his brother and father.    CURRENT MEDICATIONS:   Medication List with Changes/Refills   Current Medications    ACYCLOVIR (ZOVIRAX) 800 MG TAB    Take 1 tablet (800 mg total) by mouth 2 (two) times daily.    BUDESONIDE (ENTOCORT EC) 3 MG CAPSULE    Take 1 capsule (3 mg total) by mouth 3 (three) times daily.    CARVEDILOL (COREG) 6.25 MG TABLET    Take 1 tablet (6.25 mg total) by mouth 2 (two) times daily.    COPPER GLUCONATE 2 MG CAP    Take 2 mg by mouth once daily.    FOLIC ACID (FOLVITE) 1 MG TABLET    Take 1 tablet (1 mg total) by mouth once daily.    GABAPENTIN (NEURONTIN) 300 MG CAPSULE    Take 2 capsules (600 mg total) by mouth every evening.    LETERMOVIR (PREVYMIS) 480 MG TAB    Take 1 tablet (480 mg total) by mouth Daily.    LIDOCAINE (LIDODERM) 5 %    Place 1 patch onto the skin once daily. Remove & Discard patch within 12 hours or as directed by MD. Place patch to left shoulder.    LOPERAMIDE (IMODIUM) 2 MG CAPSULE    Take 1 capsule (2 mg total) by mouth 2 (two) times daily as needed for Diarrhea.    MAGNESIUM OXIDE (MAG-OX) 400 MG (241.3 MG MAGNESIUM) TABLET    Take 2 tablets (800 mg total) by mouth 2 (two) times daily.    ONDANSETRON (ZOFRAN-ODT) 8 MG TBDL    Take 1 tablet (8 mg total) by mouth every 8 (eight) hours as needed (nausea/vomiting).    PANTOPRAZOLE (PROTONIX) 40 MG TABLET    Take 1 tablet (40 mg total) by mouth once daily.    POSACONAZOLE (NOXAFIL) 100 MG TBEC TABLET    Take 3 tablets (300 mg total) by mouth once daily.    PREDNISONE (DELTASONE) 10 MG TABLET    Take 3 tablets (30 mg total) by mouth once daily.    PROCHLORPERAZINE (COMPAZINE) 5 MG TABLET    Take 1 tablet (5 mg total) by mouth 4 (four) times daily as needed for Nausea.    ROPINIROLE (REQUIP) 0.5 MG TABLET    Take 1 tablet (0.5  mg total) by mouth every evening.    SULFAMETHOXAZOLE-TRIMETHOPRIM 800-160MG (BACTRIM DS) 800-160 MG TAB    Take 1 tablet by mouth every Mon, Wed, Fri. START 10/21/24    TACROLIMUS (PROGRAF) 0.5 MG CAP    Take 1 capsule (0.5 mg total) by mouth every morning AND 1 capsule (0.5 mg total) every evening.    TRAMADOL (ULTRAM) 50 MG TABLET    Take 1 tablet (50 mg total) by mouth every 6 (six) hours as needed for Pain.   Changed and/or Refilled Medications    Modified Medication Previous Medication    ZOLPIDEM (AMBIEN) 5 MG TAB zolpidem (AMBIEN) 5 MG Tab       Take 1 tablet (5 mg total) by mouth nightly as needed (insomnia).    Take 1 tablet (5 mg total) by mouth nightly as needed (insomnia).       ALLERGIES:   Review of patient's allergies indicates:  No Known Allergies    GVHD Review of Systems:     Pertinent positives and negatives included in the HPI. Otherwise a 14 point review of systems is negative. GVHD review of systems recorded in BMT flowsheet.     Physical Exam:     Vitals:    11/25/24 0829   BP: 119/68   Pulse: 81   Resp: 18   Temp: 97.7 °F (36.5 °C)       General: Appears well, NAD.   HEENT: MMM, no OP lesions  Pulmonary: CTAB, no increased work of breathing, no W/R/C  Cardiovascular: S1S2 normal, RRR, no M/R/G  Abdominal: Soft, NT, ND, BS+, no HSM  Extremities: No C/C/E  Neurological: AAOx4, grossly normal, no focal deficits  Dermatologic: No appreciable rashes or lesions    ECOG Performance Status: (foot note - ECOG PS provided by [employer] Cooperative Oncology Group) 1 - Symptomatic but completely ambulatory    Karnofsky Performance Score:  80%- Normal Activity with Effort: Some Symptoms of Disease    Labs:   Lab Results   Component Value Date    WBC 5.12 11/25/2024    HGB 11.6 (L) 11/25/2024    HCT 34.8 (L) 11/25/2024     (H) 11/25/2024    PLT 46 (L) 11/25/2024        CMP  Sodium   Date Value Ref Range Status   11/25/2024 140 136 - 145 mmol/L Final     Potassium   Date Value Ref Range Status    11/25/2024 3.9 3.5 - 5.1 mmol/L Final     Chloride   Date Value Ref Range Status   11/25/2024 107 95 - 110 mmol/L Final     CO2   Date Value Ref Range Status   11/25/2024 28 23 - 29 mmol/L Final     Glucose   Date Value Ref Range Status   11/25/2024 102 70 - 110 mg/dL Final     BUN   Date Value Ref Range Status   11/25/2024 18 8 - 23 mg/dL Final     Creatinine   Date Value Ref Range Status   11/25/2024 0.7 0.5 - 1.4 mg/dL Final     Calcium   Date Value Ref Range Status   11/25/2024 8.5 (L) 8.7 - 10.5 mg/dL Final     Total Protein   Date Value Ref Range Status   11/25/2024 5.1 (L) 6.0 - 8.4 g/dL Final     Albumin   Date Value Ref Range Status   11/25/2024 3.3 (L) 3.5 - 5.2 g/dL Final     Total Bilirubin   Date Value Ref Range Status   11/25/2024 0.5 0.1 - 1.0 mg/dL Final     Comment:     For infants and newborns, interpretation of results should be based  on gestational age, weight and in agreement with clinical  observations.    Premature Infant recommended reference ranges:  Up to 24 hours.............<8.0 mg/dL  Up to 48 hours............<12.0 mg/dL  3-5 days..................<15.0 mg/dL  6-29 days.................<15.0 mg/dL       Alkaline Phosphatase   Date Value Ref Range Status   11/25/2024 79 40 - 150 U/L Final     AST   Date Value Ref Range Status   11/25/2024 26 10 - 40 U/L Final     ALT   Date Value Ref Range Status   11/25/2024 58 (H) 10 - 44 U/L Final     Anion Gap   Date Value Ref Range Status   11/25/2024 5 (L) 8 - 16 mmol/L Final     eGFR   Date Value Ref Range Status   11/25/2024 >60.0 >60 mL/min/1.73 m^2 Final          Imaging:   Hospital imaging reviewed.    Pathology:  Prior pathology reviewed. Plan for day +30 bone marrow biopsy on 10/23/24    Acute GVHD Scoring:  GVHD Acute Assessment      No GVHD at this time. Unable to record via flowsheets.               Assessment and Plan:   Guillaume Betsy Brad (Guillaume) is a pleasant 69 y.o.male with a past medical history of MDS s/p haplo SCT who  presents for post-transplant follow up.    MDS: Status post treatment with azacitidine 75 mg/m2 daily x7 days plus venetoclax 100mg (voriconazole) daily x14 of 28 days.Venetoclax decreased to 7 days with cycle 3 until completion of 11 cycles. No plans for maintenance at this time.     Status post allogeneic stem cell transplantation: As noted above, status post haploidentical stem cell transplantation conditioned with FluMel 100 + PTCy+ 2Gy TBI . Currently Day+ 67. Engrafted on 10/09/24 day +20.  Day 30 bone marrow (11/5/2024) showing mildly hypercellular marrow with no increased blast (0.3%) and no evidence of myeloid neoplasm. Pending chimerisms, NGS, and CG  Graft versus host disease: GVHD prophylaxis with Post-transplant cyclophosphamide, Tacrolimus, MMF (MMF d/c on D+35). Persistent nausea despite anti-emetics, reducing pill burden. 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 He started budesonide 3mg TID on 10/28/24. Persistent nausea despite budesonide so started systemic steroids 11/1 at 0.5 mg/kg and his steroid taper has been given to him.   Current tacro dose: 0.5 mg BID  Last tacro level: 8  Adjustments: no adjustments   Steroid tapering schedule is below  Date               Steroid Dose  11/04 - 11/10  Take 3 tablets (30 mg) by mouth once daily  11/11 - 11/17  Take 2 tablets (20 mg) by mouth once daily  11/18 - 11/24  Take 1 tablet (10 mg) by mouth once daily  11/25 - 12/01  Take ½ tablet (5 mg) by mouth once daily.  12/02 - 12/08  Take ½ tablet (5 mg) by mouth every other day   12/09   STOP     Immunosuppression: Prevention with posaconazole, acyclovir. CMV prophylaxis with letermovir. PJP prophyalxis Bactrim MWF. Continue weekly monitoring of CMV and EBV.  Last CMV: Not-detected, last EBV: Not-detected.   Active infections: N/A    Pancytopenia: Due to underlying disease and chemotherapy. Transfuseu for Hgb <7 g/dL and platelets <10k.  Folic acid borderline low at 4.3, will start folic acid  1mg daily   Copper level of 635, plans to start daily OTC Copper supplement    Hypomagnesemia: Related to tacro, poor po intake. Normal today.   Continue MagOx to 800mg twice daily.      Chemotherapy Induced Nausea: Resolved with steroids. Tapering as above.    BK Virus: Patient with minor pain, frequency, and hesitancy with urination. He denies any blood. Patient was offered symptomatic support but denied due to not wanting to take another pill. Will continue hydration efforts.     Orthostatic Hypotension: Resolved since last visit with no reported episodes. Educated patient to get up slowly while holding on to something and to continue fluid intake.      Anxiety, Restless Leg Syndrome: Noted since discharge by family. He started ropinirole 0.5mg and has experienced significant improvement.    Insomnia: Patient with significant improvement. Continue Ambien and Ropinirole   for RLS.     Follow up: Twice weekly follow up with labs.    Orders Placed:      Orders Placed This Encounter    zolpidem (AMBIEN) 5 MG Tab       Medical Complexity:   Visit today included increased complexity associated with the care of the episodic problems addressed above and managing the longitudinal care of the patient due to the serious and/or complex managed problem(s) history of allo SCT.      Follow Up:      Twice weekly follow up as scheduled.       Monty Henderson D.O.  Hematology/Oncology Fellow, PGY-VI

## 2024-11-25 NOTE — PROGRESS NOTES
BMT Pharmacist Medication Review Note     All current medications were reviewed with the patient and his caregiver. The patient brought in all of his medications with him to this visit.      We discussed the changes made since last meeting:   - Prednisone dose reduced to 1/2 tablet daily per taper schedule.     - Patient reports only using the lidocaine patches as needed. Updated medication list to reflect this.     - Of note, patient only has acyclovir 400 mg tablet available. Was able to fill his 800 mg dose using the 400 mg tablet strength. Patient is aware that he will run out on Thursday, and he will have the 800 mg tablet strength refilled by his primary pharmacy for the remainder of the week (Friday through Sunday).     The patient has ample supply of all medications except for Magnesium, Budesonide, Acyclovir, Bactrim, and Copper Gluconate. Informed patient when he will run out of these mediations in the pill box, and called his pharmacy to request them to be refilled on behalf of the patient.    - Patient will run out of Letermovir soon. Reached out to social work.       Medicamentos/Medications Indicación/Indication Mañana/Morning Tarde/Afternoon Noche/Night   Acyclovir 800mg  Prevención de infecciones virales  Viral infection prevention 1 tableta  1 tableta   Letermovir (Prevymis®) 480mg Prevención de infección por CMV  CMV infection prevention 1 tableta     Posaconazole 100mg Prevencón de infecciones fungales  Fungal infection prevention 3 tabletas     Sulfamethoxazole-trimethoprim (Bactrim®) 800-160mg Fungal pneumonia prevention 1 tableta los lunes, miércoles y viernes (Mon., Wed., Fri)      **Tacrolimus 0.5mg Prevención de GVHD - NO tome la dosis de por la mañana en días que se relizará laboratorios  1 cápsula  1 cápsulas   Budesonide 3 mg  GI GVHD 1 cápsula 1 cápsula 1 cápsula   Prednisone 10mg GVHD 11/25 - 12/01: ½ tableta ivette vez al día  12/02 - 12/08: ½ tableta día por medio  12/09: Detener    Magnesium oxide 400mg Suplemento de magnesio 2 tabletas  2 tabletas      Ropinirole (Requip) 0.5 mg pierna inquieta   1 tableta   Pantoprazole (Protonix) 40 mg reflujo ácido   1 tableta     Carvedilol (Coreg®) 6.25 mg Presión arterial maeve  1 tableta  1 tableta   Gabapentin (Neurontin®) 300 mg Neuropatía   2 cápsulas   **Copper Gluconate 2 mg Suplemento 1 cápsula     **Folic Acid 1 mg Suplemento 1 tableta     Lidocaine patch (Lidoderm®) 5% Dolor en los hombros 1 parcho en el hombro milagros y  1 parcho en el hombro derecho  Remueva luego de las 12 horas     **medicamento nuevo o cambios realizados al medicamento  MEDICAMENTOS CUANDO VI NECESARIOS/AS NEEDED MEDICATIONS:                                                                    Loperamida (Imodium®) 2 mg dos veces al día según sea necesario para la diarrea  Ondansetron 8mg hasta sosa veces al día según sea necesario para Náuseas/Vómito  Prochlorperazine (Compazine®) 5mg cada 6 horas as needed for Náuseas/Vómito                                                           Tramadol (Ultram®) 50 mg cada 6 horas cuando sea necesario para el dolor         **Zolpidem (Ambien) 5 mg tableta todas las noches según sea necesario para dormir     HOLD UNTIL TOLD TO RESTART:  Cilostazol      All questions were answered.      Halima Thomas PharmD  Clinical Pharmacist-BMT/Hematology  [facility]

## 2024-11-26 DIAGNOSIS — D46.9 MYELODYSPLASTIC SYNDROME: ICD-10-CM

## 2024-11-26 RX ORDER — ACYCLOVIR 800 MG/1
800 TABLET ORAL 2 TIMES DAILY
Qty: 60 TABLET | Refills: 11 | Status: SHIPPED | OUTPATIENT
Start: 2024-11-26 | End: 2025-11-26

## 2024-11-29 ENCOUNTER — INFUSION (OUTPATIENT)
Dept: INFUSION THERAPY | Facility: HOSPITAL | Age: 69
End: 2024-11-29
Attending: INTERNAL MEDICINE
Payer: MEDICARE

## 2024-11-29 ENCOUNTER — OFFICE VISIT (OUTPATIENT)
Dept: HEMATOLOGY/ONCOLOGY | Facility: CLINIC | Age: 69
End: 2024-11-29
Payer: MEDICARE

## 2024-11-29 VITALS
RESPIRATION RATE: 18 BRPM | SYSTOLIC BLOOD PRESSURE: 115 MMHG | HEIGHT: 69 IN | HEART RATE: 73 BPM | WEIGHT: 137.56 LBS | OXYGEN SATURATION: 98 % | DIASTOLIC BLOOD PRESSURE: 69 MMHG | TEMPERATURE: 98 F | BODY MASS INDEX: 20.38 KG/M2

## 2024-11-29 DIAGNOSIS — D46.9 MYELODYSPLASTIC SYNDROME: Primary | ICD-10-CM

## 2024-11-29 DIAGNOSIS — E61.0 COPPER DEFICIENCY: ICD-10-CM

## 2024-11-29 DIAGNOSIS — G47.00 INSOMNIA, UNSPECIFIED TYPE: ICD-10-CM

## 2024-11-29 DIAGNOSIS — Z94.81 S/P ALLOGENEIC BONE MARROW TRANSPLANT: ICD-10-CM

## 2024-11-29 DIAGNOSIS — T45.1X5A CHEMOTHERAPY-INDUCED NAUSEA: ICD-10-CM

## 2024-11-29 DIAGNOSIS — R11.0 CHEMOTHERAPY-INDUCED NAUSEA: ICD-10-CM

## 2024-11-29 DIAGNOSIS — R30.0 DYSURIA: ICD-10-CM

## 2024-11-29 DIAGNOSIS — G25.81 RESTLESS LEG SYNDROME: ICD-10-CM

## 2024-11-29 DIAGNOSIS — D61.818 PANCYTOPENIA: ICD-10-CM

## 2024-11-29 DIAGNOSIS — D84.81 IMMUNODEFICIENCY DUE TO CONDITIONS CLASSIFIED ELSEWHERE: ICD-10-CM

## 2024-11-29 DIAGNOSIS — E83.42 HYPOMAGNESEMIA: ICD-10-CM

## 2024-11-29 DIAGNOSIS — Z76.82 STEM CELL TRANSPLANT CANDIDATE: ICD-10-CM

## 2024-11-29 DIAGNOSIS — E53.8 FOLIC ACID DEFICIENCY: ICD-10-CM

## 2024-11-29 LAB
ABO + RH BLD: NORMAL
ALBUMIN SERPL BCP-MCNC: 3.2 G/DL (ref 3.5–5.2)
ALP SERPL-CCNC: 100 U/L (ref 40–150)
ALT SERPL W/O P-5'-P-CCNC: 47 U/L (ref 10–44)
ANION GAP SERPL CALC-SCNC: 8 MMOL/L (ref 8–16)
AST SERPL-CCNC: 24 U/L (ref 10–40)
BASOPHILS # BLD AUTO: 0 K/UL (ref 0–0.2)
BASOPHILS NFR BLD: 0 % (ref 0–1.9)
BILIRUB SERPL-MCNC: 0.4 MG/DL (ref 0.1–1)
BLD GP AB SCN CELLS X3 SERPL QL: NORMAL
BUN SERPL-MCNC: 23 MG/DL (ref 8–23)
CALCIUM SERPL-MCNC: 9.3 MG/DL (ref 8.7–10.5)
CHLORIDE SERPL-SCNC: 108 MMOL/L (ref 95–110)
CMV DNA SPEC QL NAA+PROBE: NORMAL
CO2 SERPL-SCNC: 25 MMOL/L (ref 23–29)
CREAT SERPL-MCNC: 0.8 MG/DL (ref 0.5–1.4)
CYTOMEGALOVIRUS PCR, QUANT: NOT DETECTED IU/ML
DIFFERENTIAL METHOD BLD: ABNORMAL
EOSINOPHIL # BLD AUTO: 0 K/UL (ref 0–0.5)
EOSINOPHIL NFR BLD: 1.2 % (ref 0–8)
EPSTEIN-BARR VIRUS DNA: NORMAL
EPSTEIN-BARR VIRUS PCR, QUANT: NOT DETECTED IU/ML
ERYTHROCYTE [DISTWIDTH] IN BLOOD BY AUTOMATED COUNT: ABNORMAL % (ref 11.5–14.5)
EST. GFR  (NO RACE VARIABLE): >60 ML/MIN/1.73 M^2
GLUCOSE SERPL-MCNC: 131 MG/DL (ref 70–110)
HCT VFR BLD AUTO: 34.8 % (ref 40–54)
HGB BLD-MCNC: 11.7 G/DL (ref 14–18)
IMM GRANULOCYTES # BLD AUTO: 0.01 K/UL (ref 0–0.04)
IMM GRANULOCYTES NFR BLD AUTO: 0.3 % (ref 0–0.5)
LYMPHOCYTES # BLD AUTO: 0.4 K/UL (ref 1–4.8)
LYMPHOCYTES NFR BLD: 11.4 % (ref 18–48)
MAGNESIUM SERPL-MCNC: 1.5 MG/DL (ref 1.6–2.6)
MCH RBC QN AUTO: 36.6 PG (ref 27–31)
MCHC RBC AUTO-ENTMCNC: 33.6 G/DL (ref 32–36)
MCV RBC AUTO: 109 FL (ref 82–98)
MONOCYTES # BLD AUTO: 0.2 K/UL (ref 0.3–1)
MONOCYTES NFR BLD: 6.6 % (ref 4–15)
NEUTROPHILS # BLD AUTO: 2.7 K/UL (ref 1.8–7.7)
NEUTROPHILS NFR BLD: 80.5 % (ref 38–73)
NRBC BLD-RTO: 0 /100 WBC
PHOSPHATE SERPL-MCNC: 3.3 MG/DL (ref 2.7–4.5)
PLATELET # BLD AUTO: 44 K/UL (ref 150–450)
PMV BLD AUTO: 12.9 FL (ref 9.2–12.9)
POTASSIUM SERPL-SCNC: 4.1 MMOL/L (ref 3.5–5.1)
PROT SERPL-MCNC: 5.7 G/DL (ref 6–8.4)
RBC # BLD AUTO: 3.2 M/UL (ref 4.6–6.2)
SODIUM SERPL-SCNC: 141 MMOL/L (ref 136–145)
SPECIMEN OUTDATE: NORMAL
TACROLIMUS BLD-MCNC: 10.1 NG/ML (ref 5–15)
WBC # BLD AUTO: 3.32 K/UL (ref 3.9–12.7)

## 2024-11-29 PROCEDURE — 80053 COMPREHEN METABOLIC PANEL: CPT | Performed by: INTERNAL MEDICINE

## 2024-11-29 PROCEDURE — 86850 RBC ANTIBODY SCREEN: CPT | Performed by: INTERNAL MEDICINE

## 2024-11-29 PROCEDURE — 85025 COMPLETE CBC W/AUTO DIFF WBC: CPT | Performed by: INTERNAL MEDICINE

## 2024-11-29 PROCEDURE — 87799 DETECT AGENT NOS DNA QUANT: CPT | Performed by: INTERNAL MEDICINE

## 2024-11-29 PROCEDURE — 63600175 PHARM REV CODE 636 W HCPCS: Performed by: INTERNAL MEDICINE

## 2024-11-29 PROCEDURE — 84100 ASSAY OF PHOSPHORUS: CPT | Performed by: INTERNAL MEDICINE

## 2024-11-29 PROCEDURE — 83735 ASSAY OF MAGNESIUM: CPT | Performed by: INTERNAL MEDICINE

## 2024-11-29 PROCEDURE — 36592 COLLECT BLOOD FROM PICC: CPT

## 2024-11-29 PROCEDURE — 80197 ASSAY OF TACROLIMUS: CPT | Performed by: INTERNAL MEDICINE

## 2024-11-29 PROCEDURE — A4216 STERILE WATER/SALINE, 10 ML: HCPCS | Performed by: INTERNAL MEDICINE

## 2024-11-29 PROCEDURE — 25000003 PHARM REV CODE 250: Performed by: INTERNAL MEDICINE

## 2024-11-29 PROCEDURE — 99999 PR PBB SHADOW E&M-EST. PATIENT-LVL IV: CPT | Mod: PBBFAC,,, | Performed by: INTERNAL MEDICINE

## 2024-11-29 RX ORDER — HEPARIN 100 UNIT/ML
500 SYRINGE INTRAVENOUS
Status: DISCONTINUED | OUTPATIENT
Start: 2024-11-29 | End: 2024-11-29 | Stop reason: HOSPADM

## 2024-11-29 RX ORDER — HEPARIN 100 UNIT/ML
500 SYRINGE INTRAVENOUS
OUTPATIENT
Start: 2024-11-29

## 2024-11-29 RX ORDER — SODIUM CHLORIDE 0.9 % (FLUSH) 0.9 %
10 SYRINGE (ML) INJECTION
OUTPATIENT
Start: 2024-11-29

## 2024-11-29 RX ORDER — SODIUM CHLORIDE 0.9 % (FLUSH) 0.9 %
10 SYRINGE (ML) INJECTION
Status: DISCONTINUED | OUTPATIENT
Start: 2024-11-29 | End: 2024-11-29 | Stop reason: HOSPADM

## 2024-11-29 RX ADMIN — HEPARIN 500 UNITS: 100 SYRINGE at 08:11

## 2024-11-29 RX ADMIN — SODIUM CHLORIDE, PRESERVATIVE FREE 10 ML: 5 INJECTION INTRAVENOUS at 08:11

## 2024-11-29 NOTE — PROGRESS NOTES
Section of Hematology and Stem Cell Transplantation    Post-Transplantation Follow Up Visit     11/29/2024    Transplant History:   Primary oncologist: Paco Hickey MD  Primary oncologic diagnosis: MDS  Transplant date: 9/19/2024  Donor: haploidentical  Blood Type (Patient): B +  Blood Type (Donor): A +  CMV (Patient): Positive  CMV (Donor): Positive  Graft source: Bone marrow  CD34+ cell dose: 3.55x10^6  Conditioning Regimen: Fludarabine plus melphalan 100 mg/m2 + 2Gy TBI  GVHD prophylaxis: Post-transplant cyclophosphamide, Tacrolimus, MMF  Immediate post-transplant complications: Patient experienced expected GI toxicities with C diff negative diarrhea and nausea, neutropenic fever with negative infectious work up, expected cytopenias requiring transfusions, electrolyte abnormalities requiring replacement, volume overload requiring diuresis, intermittent SOB from pulmonary edema requiring 02, and a hemorrhoid flare up. Diarrhea and SOB improved towards the end of the hospital stay.     History of Present Ilness:   Guillaume Salinas (Guillaume) is a pleasant 69 y.o.male with a past medical history of MDS who is status post haploidentical stem cell transplantation conditioned with FluMel 100 + PTCy+ 2Gy TBI who is currently day +71  who presents for post-transplant follow up. Offered  through language services, declined - wife assisted.     Interval History: Patient presents for routine follow-up. He reports doing well with improved appetite with him eating three full meals daily with no nausea. No bleeding/bruising. No rash. No pruritis. No nausea/vomiting. No diarrhea. He has been trying to stay active.     PAST MEDICAL HISTORY:   Past Medical History:   Diagnosis Date    Anticoagulant long-term use     Coronary artery disease     Hypertension     Myelodysplastic syndrome     Peripheral vascular disease, unspecified        PAST SURGICAL HISTORY:   Past Surgical History:   Procedure Laterality Date     BONE MARROW BIOPSY Left 2023    Procedure: Biopsy-bone marrow;  Surgeon: Harry Diamond MD;  Location: [facility] OR;  Service: Oncology;  Laterality: Left;    COLONOSCOPY N/A 2022    Procedure: COLONOSCOPY Golytely Vaccinated will bring cards;  Surgeon: Dereje Simon MD;  Location: [facility] ENDO;  Service: Endoscopy;  Laterality: N/A;  Do not cancel this order    INSERTION OF LEMONS CATHETER Right 2024    Procedure: INSERTION, CATHETER, CENTRAL VENOUS, LEMONS TRIPLE LUMEN;  Surgeon: Kg Patten MD;  Location: [facility] OR [address] [place] [address];  Service: General;  Laterality: Right;     PAST SOCIAL HISTORY:  Social History     Socioeconomic History    Marital status:    Tobacco Use    Smoking status: Former     Current packs/day: 0.00     Average packs/day: 0.3 packs/day for 50.0 years (12.5 ttl pk-yrs)     Types: Cigarettes     Start date: 3/1/1973     Quit date: 3/1/2023     Years since quittin.7     Passive exposure: Past    Smokeless tobacco: Never   Substance and Sexual Activity    Alcohol use: Not Currently    Drug use: Never    Sexual activity: Not Currently     Partners: Female     Social Drivers of Health     Financial Resource Strain: Low Risk  (2024)    Overall Financial Resource Strain (CARDIA)     Difficulty of Paying Living Expenses: Not hard at all   Food Insecurity: No Food Insecurity (2024)    Hunger Vital Sign     Worried About Running Out of Food in the Last Year: Never true     Ran Out of Food in the Last Year: Never true   Transportation Needs: No Transportation Needs (2024)    TRANSPORTATION NEEDS     Transportation : No   Physical Activity: Insufficiently Active (2024)    Exercise Vital Sign     Days of Exercise per Week: 2 days     Minutes of Exercise per Session: 60 min   Stress: Stress Concern Present (2024)    [place] citizen [employer] - Occupational Stress Questionnaire     Feeling of Stress : To some extent   Housing  Stability: Low Risk  (9/30/2024)    Housing Stability Vital Sign     Unable to Pay for Housing in the Last Year: No     Homeless in the Last Year: No       FAMILY HISTORY:  Cancer-related family history includes Cancer in his brother and father.    CURRENT MEDICATIONS:   Medication List with Changes/Refills   Current Medications    ACYCLOVIR (ZOVIRAX) 800 MG TAB    Take 1 tablet (800 mg total) by mouth 2 (two) times daily.    BUDESONIDE (ENTOCORT EC) 3 MG CAPSULE    Take 1 capsule (3 mg total) by mouth 3 (three) times daily.    CARVEDILOL (COREG) 6.25 MG TABLET    Take 1 tablet (6.25 mg total) by mouth 2 (two) times daily.    COPPER GLUCONATE 2 MG CAP    Take 2 mg by mouth once daily.    FOLIC ACID (FOLVITE) 1 MG TABLET    Take 1 tablet (1 mg total) by mouth once daily.    GABAPENTIN (NEURONTIN) 300 MG CAPSULE    Take 2 capsules (600 mg total) by mouth every evening.    LETERMOVIR (PREVYMIS) 480 MG TAB    Take 1 tablet (480 mg total) by mouth Daily.    LIDOCAINE (LIDODERM) 5 %    Place 1 patch onto the skin once daily. Remove & Discard patch within 12 hours or as directed by MD. Place patch to left shoulder.    LOPERAMIDE (IMODIUM) 2 MG CAPSULE    Take 1 capsule (2 mg total) by mouth 2 (two) times daily as needed for Diarrhea.    MAGNESIUM OXIDE (MAG-OX) 400 MG (241.3 MG MAGNESIUM) TABLET    Take 2 tablets (800 mg total) by mouth 2 (two) times daily.    ONDANSETRON (ZOFRAN-ODT) 8 MG TBDL    Take 1 tablet (8 mg total) by mouth every 8 (eight) hours as needed (nausea/vomiting).    PANTOPRAZOLE (PROTONIX) 40 MG TABLET    Take 1 tablet (40 mg total) by mouth once daily.    POSACONAZOLE (NOXAFIL) 100 MG TBEC TABLET    Take 3 tablets (300 mg total) by mouth once daily.    PREDNISONE (DELTASONE) 10 MG TABLET    Take 3 tablets (30 mg total) by mouth once daily.    PROCHLORPERAZINE (COMPAZINE) 5 MG TABLET    Take 1 tablet (5 mg total) by mouth 4 (four) times daily as needed for Nausea.    ROPINIROLE (REQUIP) 0.5 MG TABLET     Take 1 tablet (0.5 mg total) by mouth every evening.    SULFAMETHOXAZOLE-TRIMETHOPRIM 800-160MG (BACTRIM DS) 800-160 MG TAB    Take 1 tablet by mouth every Mon, Wed, Fri. START 10/21/24    TACROLIMUS (PROGRAF) 0.5 MG CAP    Take 1 capsule (0.5 mg total) by mouth every morning AND 1 capsule (0.5 mg total) every evening.    TRAMADOL (ULTRAM) 50 MG TABLET    Take 1 tablet (50 mg total) by mouth every 6 (six) hours as needed for Pain.    ZOLPIDEM (AMBIEN) 5 MG TAB    Take 1 tablet (5 mg total) by mouth nightly as needed (insomnia).       ALLERGIES:   Review of patient's allergies indicates:  No Known Allergies    GVHD Review of Systems:     Pertinent positives and negatives included in the HPI. Otherwise a 14 point review of systems is negative. GVHD review of systems recorded in BMT flowsheet.     Physical Exam:     Vitals:    11/29/24 0851   BP: 115/69   Pulse: 73   Resp: 18   Temp: 97.8 °F (36.6 °C)         General: Appears well, NAD.   HEENT: MMM, no OP lesions  Pulmonary: CTAB, no increased work of breathing, no W/R/C  Cardiovascular: S1S2 normal, RRR, no M/R/G  Abdominal: Soft, NT, ND, BS+, no HSM  Extremities: No C/C/E  Neurological: AAOx4, grossly normal, no focal deficits  Dermatologic: No appreciable rashes or lesions    ECOG Performance Status: (foot note - ECOG PS provided by Eastern Cooperative Oncology Group) 1 - Symptomatic but completely ambulatory    Karnofsky Performance Score:  80%- Normal Activity with Effort: Some Symptoms of Disease    Labs:   Lab Results   Component Value Date    WBC 3.32 (L) 11/29/2024    HGB 11.7 (L) 11/29/2024    HCT 34.8 (L) 11/29/2024     (H) 11/29/2024    PLT 44 (L) 11/29/2024        CMP  Sodium   Date Value Ref Range Status   11/29/2024 141 136 - 145 mmol/L Final     Potassium   Date Value Ref Range Status   11/29/2024 4.1 3.5 - 5.1 mmol/L Final     Chloride   Date Value Ref Range Status   11/29/2024 108 95 - 110 mmol/L Final     CO2   Date Value Ref Range Status    11/29/2024 25 23 - 29 mmol/L Final     Glucose   Date Value Ref Range Status   11/29/2024 131 (H) 70 - 110 mg/dL Final     BUN   Date Value Ref Range Status   11/29/2024 23 8 - 23 mg/dL Final     Creatinine   Date Value Ref Range Status   11/29/2024 0.8 0.5 - 1.4 mg/dL Final     Calcium   Date Value Ref Range Status   11/29/2024 9.3 8.7 - 10.5 mg/dL Final     Total Protein   Date Value Ref Range Status   11/29/2024 5.7 (L) 6.0 - 8.4 g/dL Final     Albumin   Date Value Ref Range Status   11/29/2024 3.2 (L) 3.5 - 5.2 g/dL Final     Total Bilirubin   Date Value Ref Range Status   11/29/2024 0.4 0.1 - 1.0 mg/dL Final     Comment:     For infants and newborns, interpretation of results should be based  on gestational age, weight and in agreement with clinical  observations.    Premature Infant recommended reference ranges:  Up to 24 hours.............<8.0 mg/dL  Up to 48 hours............<12.0 mg/dL  3-5 days..................<15.0 mg/dL  6-29 days.................<15.0 mg/dL       Alkaline Phosphatase   Date Value Ref Range Status   11/29/2024 100 40 - 150 U/L Final     AST   Date Value Ref Range Status   11/29/2024 24 10 - 40 U/L Final     ALT   Date Value Ref Range Status   11/29/2024 47 (H) 10 - 44 U/L Final     Anion Gap   Date Value Ref Range Status   11/29/2024 8 8 - 16 mmol/L Final     eGFR   Date Value Ref Range Status   11/29/2024 >60.0 >60 mL/min/1.73 m^2 Final          Imaging:   Hospital imaging reviewed.    Pathology:  Prior pathology reviewed. Plan for day +30 bone marrow biopsy on 10/23/24    Acute GVHD Scoring:  GVHD Acute Assessment  Skin: No skin acute Sxuzf-yupqhr-Xnex Disease/rash not attributable to acute Nisnw-xfiknm-Lnec Disease  Upper Intestinal Tract: Stage 0 - No persistant nausea or vomitingNo GVHD at this time. Unable to record via flowsheets.  Liver: No liver acute Afokd-lweinp-Wupn Disease/bilirubin level not attributable to acute Fuhyy-fzinup-Qvdj Disease  Other Clinical Organ  Involvement: No  Overall Grade (Przepiorka): 0 - No stage 1-4 of any organ.      Assessment and Plan:   Guillaume Salinas (Guillaume) is a pleasant 69 y.o.male with a past medical history of MDS s/p haplo SCT who presents for post-transplant follow up.    MDS: Status post treatment with azacitidine 75 mg/m2 daily x7 days plus venetoclax 100mg (voriconazole) daily x14 of 28 days.Venetoclax decreased to 7 days with cycle 3 until completion of 11 cycles. No plans for maintenance at this time.     Status post allogeneic stem cell transplantation: As noted above, status post haploidentical stem cell transplantation conditioned with FluMel 100 + PTCy+ 2Gy TBI . Currently Day+ 71. Engrafted on 10/09/24 day +20.  Day 30 bone marrow (11/5/2024) showing mildly hypercellular marrow with no increased blast (0.3%) and no evidence of myeloid neoplasm. Pending chimerisms, NGS, and CG  Graft versus host disease: GVHD prophylaxis with Post-transplant cyclophosphamide, Tacrolimus, MMF (MMF d/c on D+35). Persistent nausea despite anti-emetics, reducing pill burden. 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 He started budesonide 3mg TID on 10/28/24. Due to persistent nausea despite budesonide so started systemic steroids 11/1 at 0.5 mg/kg and his steroid taper has been given to him.   Current tacro dose: 0.5 mg BID  Last tacro level: 10.1  Adjustments: no adjustments   Steroid tapering schedule is below  Date               Steroid Dose  11/04 - 11/10  Take 3 tablets (30 mg) by mouth once daily  11/11 - 11/17  Take 2 tablets (20 mg) by mouth once daily  11/18 - 11/24  Take 1 tablet (10 mg) by mouth once daily  11/25 - 12/01  Take ½ tablet (5 mg) by mouth once daily.  12/02 - 12/08  Take ½ tablet (5 mg) by mouth every other day   12/09   STOP     Immunosuppression: Prevention with posaconazole, acyclovir. CMV prophylaxis with letermovir. PJP prophyalxis Bactrim MWF. Continue weekly monitoring of CMV and EBV.  Last CMV:  Not-detected, last EBV: Not-detected.   Active infections: N/A    Pancytopenia: Due to underlying disease and chemotherapy. Transfuseu for Hgb <7 g/dL and platelets <10k.  Folic acid borderline low at 4.3, taking folic acid 1mg daily   Copper level of 635, has started daily OTC Copper supplement    Hypomagnesemia: Related to tacro, poor po intake. 1.5 today.   Continue MagOx to 800mg twice daily.      Chemotherapy Induced Nausea: Resolved with steroids. Tapering as above.    BK Virus: Patient does not have anymore pain or hesitancy with urination. He denies any blood. He is still experiencing some frequency. Will continue hydration efforts.      Anxiety, Restless Leg Syndrome: Noted since discharge by family. He started ropinirole 0.5mg and has experienced significant improvement.    Insomnia: Patient with significant improvement. Continue Ambien and Ropinirole   for RLS.     Follow up: Twice weekly follow up with labs.    Orders Placed:             Medical Complexity:   Visit today included increased complexity associated with the care of the episodic problems addressed above and managing the longitudinal care of the patient due to the serious and/or complex managed problem(s) history of allo SCT.      Follow Up:      Twice weekly follow up as scheduled.       A total of 20 minutes was spent in pre-visit chart review, personal interpretation of labs and imaging, and medication review. Total visit time 40 minutes, >50 % counseling.

## 2024-12-02 ENCOUNTER — TELEPHONE (OUTPATIENT)
Dept: HEMATOLOGY/ONCOLOGY | Facility: CLINIC | Age: 69
End: 2024-12-02

## 2024-12-02 ENCOUNTER — OFFICE VISIT (OUTPATIENT)
Dept: HEMATOLOGY/ONCOLOGY | Facility: CLINIC | Age: 69
End: 2024-12-02
Payer: MEDICARE

## 2024-12-02 ENCOUNTER — INFUSION (OUTPATIENT)
Dept: INFUSION THERAPY | Facility: HOSPITAL | Age: 69
End: 2024-12-02
Payer: MEDICARE

## 2024-12-02 ENCOUNTER — CLINICAL SUPPORT (OUTPATIENT)
Dept: HEMATOLOGY/ONCOLOGY | Facility: CLINIC | Age: 69
End: 2024-12-02
Payer: MEDICARE

## 2024-12-02 VITALS
WEIGHT: 138.75 LBS | HEART RATE: 80 BPM | TEMPERATURE: 98 F | SYSTOLIC BLOOD PRESSURE: 129 MMHG | BODY MASS INDEX: 20.55 KG/M2 | HEIGHT: 69 IN | OXYGEN SATURATION: 99 % | DIASTOLIC BLOOD PRESSURE: 75 MMHG | RESPIRATION RATE: 18 BRPM

## 2024-12-02 DIAGNOSIS — D46.9 MYELODYSPLASTIC SYNDROME: Primary | ICD-10-CM

## 2024-12-02 DIAGNOSIS — D61.818 PANCYTOPENIA: ICD-10-CM

## 2024-12-02 DIAGNOSIS — D84.81 IMMUNODEFICIENCY DUE TO CONDITIONS CLASSIFIED ELSEWHERE: ICD-10-CM

## 2024-12-02 DIAGNOSIS — Z94.81 S/P ALLOGENEIC BONE MARROW TRANSPLANT: Primary | ICD-10-CM

## 2024-12-02 DIAGNOSIS — T45.1X5A CHEMOTHERAPY-INDUCED NAUSEA: ICD-10-CM

## 2024-12-02 DIAGNOSIS — R11.0 CHEMOTHERAPY-INDUCED NAUSEA: ICD-10-CM

## 2024-12-02 DIAGNOSIS — E61.0 COPPER DEFICIENCY: ICD-10-CM

## 2024-12-02 DIAGNOSIS — G25.81 RESTLESS LEG SYNDROME: ICD-10-CM

## 2024-12-02 DIAGNOSIS — Z76.82 STEM CELL TRANSPLANT CANDIDATE: ICD-10-CM

## 2024-12-02 DIAGNOSIS — G47.00 INSOMNIA, UNSPECIFIED TYPE: ICD-10-CM

## 2024-12-02 DIAGNOSIS — E53.8 FOLIC ACID DEFICIENCY: ICD-10-CM

## 2024-12-02 DIAGNOSIS — E83.42 HYPOMAGNESEMIA: ICD-10-CM

## 2024-12-02 DIAGNOSIS — D46.9 MYELODYSPLASTIC SYNDROME: ICD-10-CM

## 2024-12-02 LAB
ABO + RH BLD: NORMAL
ALBUMIN SERPL BCP-MCNC: 3.3 G/DL (ref 3.5–5.2)
ALP SERPL-CCNC: 116 U/L (ref 40–150)
ALT SERPL W/O P-5'-P-CCNC: 42 U/L (ref 10–44)
ANION GAP SERPL CALC-SCNC: 9 MMOL/L (ref 8–16)
AST SERPL-CCNC: 23 U/L (ref 10–40)
BASOPHILS # BLD AUTO: 0 K/UL (ref 0–0.2)
BASOPHILS NFR BLD: 0 % (ref 0–1.9)
BILIRUB SERPL-MCNC: 0.3 MG/DL (ref 0.1–1)
BLD GP AB SCN CELLS X3 SERPL QL: NORMAL
BUN SERPL-MCNC: 16 MG/DL (ref 8–23)
CALCIUM SERPL-MCNC: 8.8 MG/DL (ref 8.7–10.5)
CHLORIDE SERPL-SCNC: 105 MMOL/L (ref 95–110)
CMV DNA SPEC QL NAA+PROBE: NORMAL
CO2 SERPL-SCNC: 26 MMOL/L (ref 23–29)
CREAT SERPL-MCNC: 0.8 MG/DL (ref 0.5–1.4)
CYTOMEGALOVIRUS PCR, QUANT: NOT DETECTED IU/ML
DIFFERENTIAL METHOD BLD: ABNORMAL
EOSINOPHIL # BLD AUTO: 0 K/UL (ref 0–0.5)
EOSINOPHIL NFR BLD: 0.3 % (ref 0–8)
EPSTEIN-BARR VIRUS DNA: NORMAL
EPSTEIN-BARR VIRUS PCR, QUANT: NOT DETECTED IU/ML
ERYTHROCYTE [DISTWIDTH] IN BLOOD BY AUTOMATED COUNT: 22.1 % (ref 11.5–14.5)
EST. GFR  (NO RACE VARIABLE): >60 ML/MIN/1.73 M^2
GLUCOSE SERPL-MCNC: 166 MG/DL (ref 70–110)
HCT VFR BLD AUTO: 35.2 % (ref 40–54)
HGB BLD-MCNC: 11.7 G/DL (ref 14–18)
IMM GRANULOCYTES # BLD AUTO: 0.01 K/UL (ref 0–0.04)
IMM GRANULOCYTES NFR BLD AUTO: 0.3 % (ref 0–0.5)
LYMPHOCYTES # BLD AUTO: 0.4 K/UL (ref 1–4.8)
LYMPHOCYTES NFR BLD: 12.5 % (ref 18–48)
MAGNESIUM SERPL-MCNC: 1.8 MG/DL (ref 1.6–2.6)
MCH RBC QN AUTO: 36.7 PG (ref 27–31)
MCHC RBC AUTO-ENTMCNC: 33.2 G/DL (ref 32–36)
MCV RBC AUTO: 110 FL (ref 82–98)
MONOCYTES # BLD AUTO: 0.2 K/UL (ref 0.3–1)
MONOCYTES NFR BLD: 6.3 % (ref 4–15)
NEUTROPHILS # BLD AUTO: 2.7 K/UL (ref 1.8–7.7)
NEUTROPHILS NFR BLD: 80.6 % (ref 38–73)
NRBC BLD-RTO: 0 /100 WBC
PHOSPHATE SERPL-MCNC: 2.6 MG/DL (ref 2.7–4.5)
PLATELET # BLD AUTO: 35 K/UL (ref 150–450)
PLATELET BLD QL SMEAR: ABNORMAL
PMV BLD AUTO: 10.8 FL (ref 9.2–12.9)
POTASSIUM SERPL-SCNC: 4 MMOL/L (ref 3.5–5.1)
PROT SERPL-MCNC: 5.6 G/DL (ref 6–8.4)
RBC # BLD AUTO: 3.19 M/UL (ref 4.6–6.2)
SODIUM SERPL-SCNC: 140 MMOL/L (ref 136–145)
SPECIMEN OUTDATE: NORMAL
TACROLIMUS BLD-MCNC: 6.2 NG/ML (ref 5–15)
WBC # BLD AUTO: 3.36 K/UL (ref 3.9–12.7)

## 2024-12-02 PROCEDURE — 80053 COMPREHEN METABOLIC PANEL: CPT | Performed by: INTERNAL MEDICINE

## 2024-12-02 PROCEDURE — 99999 PR PBB SHADOW E&M-EST. PATIENT-LVL II: CPT | Mod: PBBFAC,,,

## 2024-12-02 PROCEDURE — 86901 BLOOD TYPING SEROLOGIC RH(D): CPT | Performed by: INTERNAL MEDICINE

## 2024-12-02 PROCEDURE — 85025 COMPLETE CBC W/AUTO DIFF WBC: CPT | Performed by: INTERNAL MEDICINE

## 2024-12-02 PROCEDURE — 36592 COLLECT BLOOD FROM PICC: CPT

## 2024-12-02 PROCEDURE — 87799 DETECT AGENT NOS DNA QUANT: CPT | Performed by: INTERNAL MEDICINE

## 2024-12-02 PROCEDURE — 63600175 PHARM REV CODE 636 W HCPCS: Performed by: INTERNAL MEDICINE

## 2024-12-02 PROCEDURE — 83735 ASSAY OF MAGNESIUM: CPT | Performed by: INTERNAL MEDICINE

## 2024-12-02 PROCEDURE — 84100 ASSAY OF PHOSPHORUS: CPT | Performed by: INTERNAL MEDICINE

## 2024-12-02 PROCEDURE — 99999 PR PBB SHADOW E&M-EST. PATIENT-LVL IV: CPT | Mod: PBBFAC,,, | Performed by: INTERNAL MEDICINE

## 2024-12-02 PROCEDURE — A4216 STERILE WATER/SALINE, 10 ML: HCPCS | Performed by: INTERNAL MEDICINE

## 2024-12-02 PROCEDURE — 25000003 PHARM REV CODE 250: Performed by: INTERNAL MEDICINE

## 2024-12-02 PROCEDURE — 80197 ASSAY OF TACROLIMUS: CPT | Performed by: INTERNAL MEDICINE

## 2024-12-02 RX ORDER — HEPARIN 100 UNIT/ML
500 SYRINGE INTRAVENOUS
Status: CANCELLED | OUTPATIENT
Start: 2024-12-02

## 2024-12-02 RX ORDER — HEPARIN 100 UNIT/ML
500 SYRINGE INTRAVENOUS
Status: DISCONTINUED | OUTPATIENT
Start: 2024-12-02 | End: 2024-12-02 | Stop reason: HOSPADM

## 2024-12-02 RX ORDER — SODIUM CHLORIDE 0.9 % (FLUSH) 0.9 %
10 SYRINGE (ML) INJECTION
Status: DISCONTINUED | OUTPATIENT
Start: 2024-12-02 | End: 2024-12-02 | Stop reason: HOSPADM

## 2024-12-02 RX ORDER — SODIUM CHLORIDE 0.9 % (FLUSH) 0.9 %
10 SYRINGE (ML) INJECTION
Status: CANCELLED | OUTPATIENT
Start: 2024-12-02

## 2024-12-02 RX ADMIN — Medication 10 ML: at 08:12

## 2024-12-02 RX ADMIN — HEPARIN 500 UNITS: 100 SYRINGE at 08:12

## 2024-12-02 NOTE — PROGRESS NOTES
Section of Hematology and Stem Cell Transplantation    Post-Transplantation Follow Up Visit     12/02/2024    Transplant History:   Primary oncologist: Paco Hickey MD  Primary oncologic diagnosis: MDS  Transplant date: 9/19/2024  Donor: haploidentical  Blood Type (Patient): B +  Blood Type (Donor): A +  CMV (Patient): Positive  CMV (Donor): Positive  Graft source: Bone marrow  CD34+ cell dose: 3.55x10^6  Conditioning Regimen: Fludarabine plus melphalan 100 mg/m2 + 2Gy TBI  GVHD prophylaxis: Post-transplant cyclophosphamide, Tacrolimus, MMF  Immediate post-transplant complications: Patient experienced expected GI toxicities with C diff negative diarrhea and nausea, neutropenic fever with negative infectious work up, expected cytopenias requiring transfusions, electrolyte abnormalities requiring replacement, volume overload requiring diuresis, intermittent SOB from pulmonary edema requiring 02, and a hemorrhoid flare up. Diarrhea and SOB improved towards the end of the hospital stay.     History of Present Ilness:   Guillaume Salinas (Guillaume) is a pleasant 69 y.o.male with a past medical history of MDS who is status post haploidentical stem cell transplantation conditioned with FluMel 100 + PTCy+ 2Gy TBI who is currently day +74  who presents for post-transplant follow up. Offered  through language services, declined - wife assisted.     Interval History: Patient presents for routine follow-up. He reports doing well with improved appetite with him eating three full meals daily with no nausea. No bleeding/bruising. No rash. No pruritis. No nausea/vomiting. No diarrhea. He has been trying to stay active.     PAST MEDICAL HISTORY:   Past Medical History:   Diagnosis Date    Anticoagulant long-term use     Coronary artery disease     Hypertension     Myelodysplastic syndrome     Peripheral vascular disease, unspecified        PAST SURGICAL HISTORY:   Past Surgical History:   Procedure Laterality Date     BONE MARROW BIOPSY Left 2023    Procedure: Biopsy-bone marrow;  Surgeon: Harry Diamond MD;  Location: [facility] OR;  Service: Oncology;  Laterality: Left;    COLONOSCOPY N/A 2022    Procedure: COLONOSCOPY Golytely Vaccinated will bring cards;  Surgeon: Dereje Simon MD;  Location: [facility] ENDO;  Service: Endoscopy;  Laterality: N/A;  Do not cancel this order    INSERTION OF LEMONS CATHETER Right 2024    Procedure: INSERTION, CATHETER, CENTRAL VENOUS, LEMONS TRIPLE LUMEN;  Surgeon: Kg Patten MD;  Location: [facility] OR [address] [place] [address];  Service: General;  Laterality: Right;     PAST SOCIAL HISTORY:  Social History     Socioeconomic History    Marital status:    Tobacco Use    Smoking status: Former     Current packs/day: 0.00     Average packs/day: 0.3 packs/day for 50.0 years (12.5 ttl pk-yrs)     Types: Cigarettes     Start date: 3/1/1973     Quit date: 3/1/2023     Years since quittin.7     Passive exposure: Past    Smokeless tobacco: Never   Substance and Sexual Activity    Alcohol use: Not Currently    Drug use: Never    Sexual activity: Not Currently     Partners: Female     Social Drivers of Health     Financial Resource Strain: Low Risk  (2024)    Overall Financial Resource Strain (CARDIA)     Difficulty of Paying Living Expenses: Not hard at all   Food Insecurity: No Food Insecurity (2024)    Hunger Vital Sign     Worried About Running Out of Food in the Last Year: Never true     Ran Out of Food in the Last Year: Never true   Transportation Needs: No Transportation Needs (2024)    TRANSPORTATION NEEDS     Transportation : No   Physical Activity: Insufficiently Active (2024)    Exercise Vital Sign     Days of Exercise per Week: 2 days     Minutes of Exercise per Session: 60 min   Stress: Stress Concern Present (2024)    [employer] - Occupational Stress Questionnaire     Feeling of Stress : To some extent   Housing  Stability: Low Risk  (9/30/2024)    Housing Stability Vital Sign     Unable to Pay for Housing in the Last Year: No     Homeless in the Last Year: No       FAMILY HISTORY:  Cancer-related family history includes Cancer in his brother and father.    CURRENT MEDICATIONS:   Medication List with Changes/Refills   Current Medications    ACYCLOVIR (ZOVIRAX) 800 MG TAB    Take 1 tablet (800 mg total) by mouth 2 (two) times daily.    BUDESONIDE (ENTOCORT EC) 3 MG CAPSULE    Take 1 capsule (3 mg total) by mouth 3 (three) times daily.    CARVEDILOL (COREG) 6.25 MG TABLET    Take 1 tablet (6.25 mg total) by mouth 2 (two) times daily.    COPPER GLUCONATE 2 MG CAP    Take 2 mg by mouth once daily.    FOLIC ACID (FOLVITE) 1 MG TABLET    Take 1 tablet (1 mg total) by mouth once daily.    GABAPENTIN (NEURONTIN) 300 MG CAPSULE    Take 2 capsules (600 mg total) by mouth every evening.    LETERMOVIR (PREVYMIS) 480 MG TAB    Take 1 tablet (480 mg total) by mouth Daily.    LIDOCAINE (LIDODERM) 5 %    Place 1 patch onto the skin once daily. Remove & Discard patch within 12 hours or as directed by MD. Place patch to left shoulder.    LOPERAMIDE (IMODIUM) 2 MG CAPSULE    Take 1 capsule (2 mg total) by mouth 2 (two) times daily as needed for Diarrhea.    MAGNESIUM OXIDE (MAG-OX) 400 MG (241.3 MG MAGNESIUM) TABLET    Take 2 tablets (800 mg total) by mouth 2 (two) times daily.    ONDANSETRON (ZOFRAN-ODT) 8 MG TBDL    Take 1 tablet (8 mg total) by mouth every 8 (eight) hours as needed (nausea/vomiting).    PANTOPRAZOLE (PROTONIX) 40 MG TABLET    Take 1 tablet (40 mg total) by mouth once daily.    POSACONAZOLE (NOXAFIL) 100 MG TBEC TABLET    Take 3 tablets (300 mg total) by mouth once daily.    PREDNISONE (DELTASONE) 10 MG TABLET    Take 3 tablets (30 mg total) by mouth once daily.    PROCHLORPERAZINE (COMPAZINE) 5 MG TABLET    Take 1 tablet (5 mg total) by mouth 4 (four) times daily as needed for Nausea.    ROPINIROLE (REQUIP) 0.5 MG TABLET     Take 1 tablet (0.5 mg total) by mouth every evening.    SULFAMETHOXAZOLE-TRIMETHOPRIM 800-160MG (BACTRIM DS) 800-160 MG TAB    Take 1 tablet by mouth every Mon, Wed, Fri. START 10/21/24    TACROLIMUS (PROGRAF) 0.5 MG CAP    Take 1 capsule (0.5 mg total) by mouth every morning AND 1 capsule (0.5 mg total) every evening.    TRAMADOL (ULTRAM) 50 MG TABLET    Take 1 tablet (50 mg total) by mouth every 6 (six) hours as needed for Pain.    ZOLPIDEM (AMBIEN) 5 MG TAB    Take 1 tablet (5 mg total) by mouth nightly as needed (insomnia).       ALLERGIES:   Review of patient's allergies indicates:  No Known Allergies    GVHD Review of Systems:     Pertinent positives and negatives included in the HPI. Otherwise a 14 point review of systems is negative. GVHD review of systems recorded in BMT flowsheet.     Physical Exam:     There were no vitals filed for this visit.        General: Appears well, NAD.   HEENT: MMM, no OP lesions  Pulmonary: CTAB, no increased work of breathing, no W/R/C  Cardiovascular: S1S2 normal, RRR, no M/R/G  Abdominal: Soft, NT, ND, BS+, no HSM  Extremities: No C/C/E  Neurological: AAOx4, grossly normal, no focal deficits  Dermatologic: No appreciable rashes or lesions    ECOG Performance Status: (foot note - ECOG PS provided by [employer] Cooperative Oncology Group) 1 - Symptomatic but completely ambulatory    Karnofsky Performance Score:  80%- Normal Activity with Effort: Some Symptoms of Disease    Labs:   Lab Results   Component Value Date    WBC 3.32 (L) 11/29/2024    HGB 11.7 (L) 11/29/2024    HCT 34.8 (L) 11/29/2024     (H) 11/29/2024    PLT 44 (L) 11/29/2024        CMP  Sodium   Date Value Ref Range Status   11/29/2024 141 136 - 145 mmol/L Final     Potassium   Date Value Ref Range Status   11/29/2024 4.1 3.5 - 5.1 mmol/L Final     Chloride   Date Value Ref Range Status   11/29/2024 108 95 - 110 mmol/L Final     CO2   Date Value Ref Range Status   11/29/2024 25 23 - 29 mmol/L Final     Glucose    Date Value Ref Range Status   11/29/2024 131 (H) 70 - 110 mg/dL Final     BUN   Date Value Ref Range Status   11/29/2024 23 8 - 23 mg/dL Final     Creatinine   Date Value Ref Range Status   11/29/2024 0.8 0.5 - 1.4 mg/dL Final     Calcium   Date Value Ref Range Status   11/29/2024 9.3 8.7 - 10.5 mg/dL Final     Total Protein   Date Value Ref Range Status   11/29/2024 5.7 (L) 6.0 - 8.4 g/dL Final     Albumin   Date Value Ref Range Status   11/29/2024 3.2 (L) 3.5 - 5.2 g/dL Final     Total Bilirubin   Date Value Ref Range Status   11/29/2024 0.4 0.1 - 1.0 mg/dL Final     Comment:     For infants and newborns, interpretation of results should be based  on gestational age, weight and in agreement with clinical  observations.    Premature Infant recommended reference ranges:  Up to 24 hours.............<8.0 mg/dL  Up to 48 hours............<12.0 mg/dL  3-5 days..................<15.0 mg/dL  6-29 days.................<15.0 mg/dL       Alkaline Phosphatase   Date Value Ref Range Status   11/29/2024 100 40 - 150 U/L Final     AST   Date Value Ref Range Status   11/29/2024 24 10 - 40 U/L Final     ALT   Date Value Ref Range Status   11/29/2024 47 (H) 10 - 44 U/L Final     Anion Gap   Date Value Ref Range Status   11/29/2024 8 8 - 16 mmol/L Final     eGFR   Date Value Ref Range Status   11/29/2024 >60.0 >60 mL/min/1.73 m^2 Final          Imaging:   Hospital imaging reviewed.    Pathology:  Prior pathology reviewed. Plan for day +30 bone marrow biopsy on 10/23/24    Acute GVHD Scoring:  GVHD Acute Assessment      No GVHD at this time. Unable to record via flowsheets.               Assessment and Plan:   Guillaume Salinas (Guillaume) is a pleasant 69 y.o.male with a past medical history of MDS s/p haplo SCT who presents for post-transplant follow up.    MDS: Status post treatment with azacitidine 75 mg/m2 daily x7 days plus venetoclax 100mg (voriconazole) daily x14 of 28 days.Venetoclax decreased to 7 days with cycle 3  until completion of 11 cycles. No plans for maintenance at this time.     Status post allogeneic stem cell transplantation: As noted above, status post haploidentical stem cell transplantation conditioned with FluMel 100 + PTCy+ 2Gy TBI . Currently Day+ 74. Engrafted on 10/09/24 day +20.  Day 30 bone marrow (11/5/2024) showing mildly hypercellular marrow with no increased blast (0.3%) and no evidence of myeloid neoplasm. Pending chimerisms, NGS, and CG  Graft versus host disease: GVHD prophylaxis with Post-transplant cyclophosphamide, Tacrolimus, MMF (MMF d/c on D+35). Persistent nausea despite anti-emetics, reducing pill burden. 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 He started budesonide 3mg TID on 10/28/24. Due to persistent nausea despite budesonide so started systemic steroids 11/1 at 0.5 mg/kg and his steroid taper has been given to him.   Current tacro dose: 0.5 mg BID  Last tacro level: 10.1  Adjustments: no adjustments   Steroid tapering schedule is below  Date               Steroid Dose  11/04 - 11/10  Take 3 tablets (30 mg) by mouth once daily  11/11 - 11/17  Take 2 tablets (20 mg) by mouth once daily  11/18 - 11/24  Take 1 tablet (10 mg) by mouth once daily  11/25 - 12/01  Take ½ tablet (5 mg) by mouth once daily.  12/02 - 12/08  Take ½ tablet (5 mg) by mouth every other day   12/09   STOP     Immunosuppression: Prevention with posaconazole, acyclovir. CMV prophylaxis with letermovir. PJP prophyalxis Bactrim [facility]. Continue weekly monitoring of CMV and EBV.  Last CMV: Not-detected, last EBV: Not-detected.   Active infections: N/A    Pancytopenia: Due to underlying disease and chemotherapy. Transfuseu for Hgb <7 g/dL and platelets <10k.    Folic Acid deficiency   Folic acid borderline low at 4.3, taking folic acid 1mg daily     Copper deficiency   Copper level of 635, has started daily OTC Copper supplement    Hypomagnesemia: Related to tacro, poor po intake. 1.5 today.   Continue MagOx to  800mg twice daily.      Chemotherapy Induced Nausea: Resolved with steroids. Tapering as above.    BK Virus: Patient does not have anymore pain or hesitancy with urination. He denies any blood. He is still experiencing some frequency. Will continue hydration efforts.      Anxiety, Restless Leg Syndrome: Noted since discharge by family. He started ropinirole 0.5mg and has experienced significant improvement.    Insomnia: Patient with significant improvement. Continue Ambien and Ropinirole   for RLS.     Follow up: Twice weekly follow up with labs.    Orders Placed:             Medical Complexity:   Visit today included increased complexity associated with the care of the episodic problems addressed above and managing the longitudinal care of the patient due to the serious and/or complex managed problem(s) history of allo SCT.      Follow Up:      Twice weekly follow up as scheduled.       A total of 20 minutes was spent in pre-visit chart review, personal interpretation of labs and imaging, and medication review. Total visit time 40 minutes, >50 % counseling.

## 2024-12-02 NOTE — PROGRESS NOTES
BMT Pharmacist Medication Review Note     All current medications were reviewed with the patient and his caregiver. The patient brought in all of his medications with him to this visit.      We discussed the changes made since last meeting:   - Prednisone taper reduced to every other day. Will be stopped on Monday.     The patient has ample supply of all medications except for Magnesium (requested refill through [facility]), Bactrim and Posaconazole (called [facility] requesting refill)    - Magnesium and Bactrim will be out after today. Patient aware to self-fill once he receives refill.   - Acyclovir 800 mg script is pending at [facility]. Patient to self-fill in pill box. .   - Provided potential places where he may be able to obtain copper gluconate supplement ([facility] through [employer] or [employer]; [facility] at [facility]).     Medicamentos/Medications Indicación/Indication Mañana/Morning Tarde/Afternoon Noche/Night   Acyclovir 800mg  Prevención de infecciones virales  Viral infection prevention 1 tableta  1 tableta   Letermovir (Prevymis®) 480mg Prevención de infección por CMV  CMV infection prevention 1 tableta     Posaconazole 100mg Prevencón de infecciones fungales  Fungal infection prevention 3 tabletas     Sulfamethoxazole-trimethoprim (Bactrim®) 800-160mg Fungal pneumonia prevention 1 tableta los lunes, miércoles y viernes (Mon., Wed., Fri)      **Tacrolimus 0.5mg Prevención de GVHD - NO tome la dosis de por la mañana en días que se relizará laboratorios  1 cápsula  1 cápsulas   Budesonide 3 mg  GI GVHD 1 cápsula 1 cápsula 1 cápsula   Prednisone 10mg GVHD 12/02 - 12/08: ½ tableta día por medio  12/09: Detener   Magnesium oxide 400mg Suplemento de magnesio 2 tabletas  2 tabletas      Ropinirole (Requip) 0.5 mg pierna inquieta   1 tableta   Pantoprazole (Protonix) 40 mg reflujo ácido   1 tableta     Carvedilol (Coreg®) 6.25 mg Presión arterial maeve  1 tableta  1 tableta   Gabapentin (Neurontin®) 300 mg  Neuropatía   2 cápsulas   Copper Gluconate 2 mg Suplemento 1 cápsula     Folic Acid 1 mg Suplemento 1 tableta     **medicamento nuevo o cambios realizados al medicamento  MEDICAMENTOS CUANDO VI NECESARIOS/AS NEEDED MEDICATIONS:                                                                    Loperamida (Imodium®) 2 mg dos veces al día según sea necesario para la diarrea  Ondansetron 8mg hasta sosa veces al día según sea necesario para Náuseas/Vómito  Prochlorperazine (Compazine®) 5mg cada 6 horas as needed for Náuseas/Vómito                                                           Tramadol (Ultram®) 50 mg cada 6 horas cuando sea necesario para el dolor         Lidocaine Patch (Lidoderm) 5%: 1 parcho en el hombro milagros y  1 parcho en el hombro derecho. Remueva luego de las 12 horas. Aplicar diariamente según sea necesario para el dolor de hombro.  Zolpidem (Ambien) 5 mg tableta todas las noches según sea necesario para dormir     HOLD UNTIL TOLD TO RESTART:  Cilostazol      All questions were answered.      Halima Thomas, PharmD  Clinical Pharmacist-BMT/Hematology  [facility]

## 2024-12-02 NOTE — PROGRESS NOTES
BMT Pharmacist Immunosuppression Note:    Reviewed patient's tacrolimus level and level trend with Dr. Hickey and it is below goal range. Patient has been stable on his current dose for a while. Plan to see what his next tacrolimus level is before adjusting his dose.     Instructed patient to continue your current regimen of 1 capsules (0.5mg) in the morning and 1 capsules (0.5mg) in the evening.         Halima Thomas, PharmD  Clinical Pharmacist-BMT/Hematology  [facility]

## 2024-12-02 NOTE — PROGRESS NOTES
Section of Hematology and Stem Cell Transplantation    Post-Transplantation Follow Up Visit     12/02/2024    Transplant History:   Primary oncologist: Paco Hickey MD  Primary oncologic diagnosis: MDS  Transplant date: 9/19/2024  Donor: haploidentical  Blood Type (Patient): B +  Blood Type (Donor): A +  CMV (Patient): Positive  CMV (Donor): Positive  Graft source: Bone marrow  CD34+ cell dose: 3.55x10^6  Conditioning Regimen: Fludarabine plus melphalan 100 mg/m2 + 2Gy TBI  GVHD prophylaxis: Post-transplant cyclophosphamide, Tacrolimus, MMF  Immediate post-transplant complications: Patient experienced expected GI toxicities with C diff negative diarrhea and nausea, neutropenic fever with negative infectious work up, expected cytopenias requiring transfusions, electrolyte abnormalities requiring replacement, volume overload requiring diuresis, intermittent SOB from pulmonary edema requiring 02, and a hemorrhoid flare up. Diarrhea and SOB improved towards the end of the hospital stay.     History of Present Ilness:   Guillaume Salinas (Guillaume) is a pleasant 69 y.o.male with a past medical history of MDS who is status post haploidentical stem cell transplantation conditioned with FluMel 100 + PTCy+ 2Gy TBI who is currently day +74  who presents for post-transplant follow up. Offered  through language services, declined - wife assisted.     Interval History: Patient presents for routine follow-up. He reports doing well with improved appetite with him eating three full meals daily with no nausea/emesis. No bleeding/bruising. No rash. No pruritis. No diarrhea.  His restless leg syndrome and insomnia are improving. He is taking his folate supplement but hasn't picked up the copper supplement. Today he will start taking the prednisone 5mg every other day.     PAST MEDICAL HISTORY:   Past Medical History:   Diagnosis Date    Anticoagulant long-term use     Coronary artery disease     Hypertension      Myelodysplastic syndrome     Peripheral vascular disease, unspecified        PAST SURGICAL HISTORY:   Past Surgical History:   Procedure Laterality Date    BONE MARROW BIOPSY Left 2023    Procedure: Biopsy-bone marrow;  Surgeon: Harry Diamond MD;  Location: [facility] OR;  Service: Oncology;  Laterality: Left;    COLONOSCOPY N/A 2022    Procedure: COLONOSCOPY Golytely Vaccinated will bring cards;  Surgeon: Dereje Simon MD;  Location: [facility] ENDO;  Service: Endoscopy;  Laterality: N/A;  Do not cancel this order    INSERTION OF LEMONS CATHETER Right 2024    Procedure: INSERTION, CATHETER, CENTRAL VENOUS, LEMONS TRIPLE LUMEN;  Surgeon: Kg Patten MD;  Location: [facility] OR [address] [place] [address];  Service: General;  Laterality: Right;     PAST SOCIAL HISTORY:  Social History     Socioeconomic History    Marital status:    Tobacco Use    Smoking status: Former     Current packs/day: 0.00     Average packs/day: 0.3 packs/day for 50.0 years (12.5 ttl pk-yrs)     Types: Cigarettes     Start date: 3/1/1973     Quit date: 3/1/2023     Years since quittin.7     Passive exposure: Past    Smokeless tobacco: Never   Substance and Sexual Activity    Alcohol use: Not Currently    Drug use: Never    Sexual activity: Not Currently     Partners: Female     Social Drivers of Health     Financial Resource Strain: Low Risk  (2024)    Overall Financial Resource Strain (CARDIA)     Difficulty of Paying Living Expenses: Not hard at all   Food Insecurity: No Food Insecurity (2024)    Hunger Vital Sign     Worried About Running Out of Food in the Last Year: Never true     Ran Out of Food in the Last Year: Never true   Transportation Needs: No Transportation Needs (2024)    TRANSPORTATION NEEDS     Transportation : No   Physical Activity: Insufficiently Active (2024)    Exercise Vital Sign     Days of Exercise per Week: 2 days     Minutes of Exercise per Session: 60 min   Stress: Stress Concern  Present (9/30/2024)    [employer] - Occupational Stress Questionnaire     Feeling of Stress : To some extent   Housing Stability: Low Risk  (9/30/2024)    Housing Stability Vital Sign     Unable to Pay for Housing in the Last Year: No     Homeless in the Last Year: No       FAMILY HISTORY:  Cancer-related family history includes Cancer in his brother and father.    CURRENT MEDICATIONS:   Medication List with Changes/Refills   Current Medications    ACYCLOVIR (ZOVIRAX) 800 MG TAB    Take 1 tablet (800 mg total) by mouth 2 (two) times daily.    BUDESONIDE (ENTOCORT EC) 3 MG CAPSULE    Take 1 capsule (3 mg total) by mouth 3 (three) times daily.    CARVEDILOL (COREG) 6.25 MG TABLET    Take 1 tablet (6.25 mg total) by mouth 2 (two) times daily.    COPPER GLUCONATE 2 MG CAP    Take 2 mg by mouth once daily.    FOLIC ACID (FOLVITE) 1 MG TABLET    Take 1 tablet (1 mg total) by mouth once daily.    GABAPENTIN (NEURONTIN) 300 MG CAPSULE    Take 2 capsules (600 mg total) by mouth every evening.    LETERMOVIR (PREVYMIS) 480 MG TAB    Take 1 tablet (480 mg total) by mouth Daily.    LIDOCAINE (LIDODERM) 5 %    Place 1 patch onto the skin once daily. Remove & Discard patch within 12 hours or as directed by MD. Place patch to left shoulder.    LOPERAMIDE (IMODIUM) 2 MG CAPSULE    Take 1 capsule (2 mg total) by mouth 2 (two) times daily as needed for Diarrhea.    MAGNESIUM OXIDE (MAG-OX) 400 MG (241.3 MG MAGNESIUM) TABLET    Take 2 tablets (800 mg total) by mouth 2 (two) times daily.    ONDANSETRON (ZOFRAN-ODT) 8 MG TBDL    Take 1 tablet (8 mg total) by mouth every 8 (eight) hours as needed (nausea/vomiting).    PANTOPRAZOLE (PROTONIX) 40 MG TABLET    Take 1 tablet (40 mg total) by mouth once daily.    POSACONAZOLE (NOXAFIL) 100 MG TBEC TABLET    Take 3 tablets (300 mg total) by mouth once daily.    PREDNISONE (DELTASONE) 10 MG TABLET    Take 3 tablets (30 mg total) by mouth once daily.     PROCHLORPERAZINE (COMPAZINE) 5 MG TABLET    Take 1 tablet (5 mg total) by mouth 4 (four) times daily as needed for Nausea.    ROPINIROLE (REQUIP) 0.5 MG TABLET    Take 1 tablet (0.5 mg total) by mouth every evening.    SULFAMETHOXAZOLE-TRIMETHOPRIM 800-160MG (BACTRIM DS) 800-160 MG TAB    Take 1 tablet by mouth every Mon, Wed, Fri. START 10/21/24    TACROLIMUS (PROGRAF) 0.5 MG CAP    Take 1 capsule (0.5 mg total) by mouth every morning AND 1 capsule (0.5 mg total) every evening.    TRAMADOL (ULTRAM) 50 MG TABLET    Take 1 tablet (50 mg total) by mouth every 6 (six) hours as needed for Pain.    ZOLPIDEM (AMBIEN) 5 MG TAB    Take 1 tablet (5 mg total) by mouth nightly as needed (insomnia).       ALLERGIES:   Review of patient's allergies indicates:  No Known Allergies    GVHD Review of Systems:     Pertinent positives and negatives included in the HPI. Otherwise a 14 point review of systems is negative. GVHD review of systems recorded in BMT flowsheet.     Physical Exam:     Vitals:    12/02/24 0826   BP: 129/75   Pulse: 80   Resp: 18   Temp: 97.6 °F (36.4 °C)           General: Appears well, NAD.   HEENT: MMM, no OP lesions  Pulmonary: CTAB, no increased work of breathing, no W/R/C  Cardiovascular: S1S2 normal, RRR, no M/R/G  Abdominal: Soft, NT, ND, BS+, no HSM  Extremities: No C/C/E  Neurological: AAOx4, grossly normal, no focal deficits  Dermatologic: No appreciable rashes or lesions    ECOG Performance Status: (foot note - ECOG PS provided by Eastern Cooperative Oncology Group) 1 - Symptomatic but completely ambulatory    Karnofsky Performance Score:  80%- Normal Activity with Effort: Some Symptoms of Disease    Labs:   Lab Results   Component Value Date    WBC 3.36 (L) 12/02/2024    HGB 11.7 (L) 12/02/2024    HCT 35.2 (L) 12/02/2024     (H) 12/02/2024    PLT 35 (LL) 12/02/2024        CMP  Sodium   Date Value Ref Range Status   11/29/2024 141 136 - 145 mmol/L Final     Potassium   Date Value Ref Range  Status   11/29/2024 4.1 3.5 - 5.1 mmol/L Final     Chloride   Date Value Ref Range Status   11/29/2024 108 95 - 110 mmol/L Final     CO2   Date Value Ref Range Status   11/29/2024 25 23 - 29 mmol/L Final     Glucose   Date Value Ref Range Status   11/29/2024 131 (H) 70 - 110 mg/dL Final     BUN   Date Value Ref Range Status   11/29/2024 23 8 - 23 mg/dL Final     Creatinine   Date Value Ref Range Status   11/29/2024 0.8 0.5 - 1.4 mg/dL Final     Calcium   Date Value Ref Range Status   11/29/2024 9.3 8.7 - 10.5 mg/dL Final     Total Protein   Date Value Ref Range Status   11/29/2024 5.7 (L) 6.0 - 8.4 g/dL Final     Albumin   Date Value Ref Range Status   11/29/2024 3.2 (L) 3.5 - 5.2 g/dL Final     Total Bilirubin   Date Value Ref Range Status   11/29/2024 0.4 0.1 - 1.0 mg/dL Final     Comment:     For infants and newborns, interpretation of results should be based  on gestational age, weight and in agreement with clinical  observations.    Premature Infant recommended reference ranges:  Up to 24 hours.............<8.0 mg/dL  Up to 48 hours............<12.0 mg/dL  3-5 days..................<15.0 mg/dL  6-29 days.................<15.0 mg/dL       Alkaline Phosphatase   Date Value Ref Range Status   11/29/2024 100 40 - 150 U/L Final     AST   Date Value Ref Range Status   11/29/2024 24 10 - 40 U/L Final     ALT   Date Value Ref Range Status   11/29/2024 47 (H) 10 - 44 U/L Final     Anion Gap   Date Value Ref Range Status   11/29/2024 8 8 - 16 mmol/L Final     eGFR   Date Value Ref Range Status   11/29/2024 >60.0 >60 mL/min/1.73 m^2 Final          Imaging:   Hospital imaging reviewed.    Pathology:  Prior pathology reviewed. Plan for day +30 bone marrow biopsy on 10/23/24    Acute GVHD Scoring:  GVHD Acute Assessment      No GVHD at this time. Unable to record via flowsheets.               Assessment and Plan:   Guillaume Meyer Brad (Guillaume) is a pleasant 69 y.o.male with a past medical history of MDS s/p haplo SCT  who presents for post-transplant follow up.    MDS: Status post treatment with azacitidine 75 mg/m2 daily x7 days plus venetoclax 100mg (voriconazole) daily x14 of 28 days.Venetoclax decreased to 7 days with cycle 3 until completion of 11 cycles. No plans for maintenance at this time.     Status post allogeneic stem cell transplantation: As noted above, status post haploidentical stem cell transplantation conditioned with FluMel 100 + PTCy+ 2Gy TBI . Currently Day+ 74. Engrafted on 10/09/24 day +20.  Day 30 bone marrow (11/5/2024) showing mildly hypercellular marrow with no increased blast (0.3%) and no evidence of myeloid neoplasm. Pending chimerisms, NGS, and CG  Graft versus host disease: GVHD prophylaxis with Post-transplant cyclophosphamide, Tacrolimus, MMF (MMF d/c on D+35). Persistent nausea despite anti-emetics, reducing pill burden. 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 He started budesonide 3mg TID on 10/28/24. Due to persistent nausea despite budesonide so started systemic steroids 11/1 at 0.5 mg/kg and his steroid taper has been given to him.   Current tacro dose: 0.5 mg BID  Last tacro level: 10.1  Adjustments: no adjustments   Steroid tapering schedule is below  Date               Steroid Dose  11/04 - 11/10  Take 3 tablets (30 mg) by mouth once daily  11/11 - 11/17  Take 2 tablets (20 mg) by mouth once daily  11/18 - 11/24  Take 1 tablet (10 mg) by mouth once daily  11/25 - 12/01  Take ½ tablet (5 mg) by mouth once daily.  12/02 - 12/08  Take ½ tablet (5 mg) by mouth every other day   12/09   STOP     Immunosuppression: Prevention with posaconazole, acyclovir. CMV prophylaxis with letermovir. PJP prophyalxis Bactrim MW. Continue weekly monitoring of CMV and EBV.  Last CMV: Not-detected, last EBV: Not-detected.   Active infections: N/A    Pancytopenia: Due to underlying disease and chemotherapy. Transfuseu for Hgb <7 g/dL and platelets <10k. Folate and copper deficient, on  supplementation.     Folic Acid deficiency   Folic acid borderline low at 4.3, taking folic acid 1mg daily     Copper deficiency   Copper level of 635, has not started daily OTC Copper supplement.     Hypomagnesemia: Related to tacro, poor po intake. 1.5 today.   Continue MagOx to 800mg twice daily.      Chemotherapy Induced Nausea: Resolved with steroids. Tapering as above.     BK Virus: Patient does not have anymore pain or hesitancy with urination. He denies any blood. He is still experiencing some frequency. Will continue hydration efforts.      Anxiety, Restless Leg Syndrome: Noted since discharge by family. He started ropinirole 0.5mg and has experienced significant improvement.    Insomnia: Patient with significant improvement. Continue Ambien and Ropinirole for RLS.     Follow up: Twice weekly follow up with labs.    Orders Placed:           Medical Complexity:   Visit today included increased complexity associated with the care of the episodic problems addressed above and managing the longitudinal care of the patient due to the serious and/or complex managed problem(s) history of allo SCT.      Follow Up:      Twice weekly follow up as scheduled.       A total of 20 minutes was spent in pre-visit chart review, personal interpretation of labs and imaging, and medication review. Total visit time 40 minutes, >50 % counseling.         Lawson Currie MD  Hematology/Oncology Fellow PGY-V

## 2024-12-04 NOTE — PROGRESS NOTES
Section of Hematology and Stem Cell Transplantation    Post-Transplantation Follow Up Visit     12/05/2024    Transplant History:   Primary oncologist: Paco Hickey MD  Primary oncologic diagnosis: MDS  Transplant date: 9/19/2024  Donor: haploidentical  Blood Type (Patient): B +  Blood Type (Donor): A +  CMV (Patient): Positive  CMV (Donor): Positive  Graft source: Bone marrow  CD34+ cell dose: 3.55x10^6  Conditioning Regimen: Fludarabine plus melphalan 100 mg/m2 + 2Gy TBI  GVHD prophylaxis: Post-transplant cyclophosphamide, Tacrolimus, MMF  Immediate post-transplant complications: Patient experienced expected GI toxicities with C diff negative diarrhea and nausea, neutropenic fever with negative infectious work up, expected cytopenias requiring transfusions, electrolyte abnormalities requiring replacement, volume overload requiring diuresis, intermittent SOB from pulmonary edema requiring 02, and a hemorrhoid flare up. Diarrhea and SOB improved towards the end of the hospital stay.     History of Present Ilness:   Guillaume Salinas (Guillaume) is a pleasant 69 y.o.male with a past medical history of MDS who is status post haploidentical stem cell transplantation conditioned with FluMel 100 + PTCy+ 2Gy TBI who is currently day +77  who presents for post-transplant follow up. Offered  through language services, declined - wife assisted.     Interval History: Patient presents for routine follow-up. He reports doing well with improved appetite with him eating three full meals daily with snacks without no nausea/emesis. No bleeding/bruising. No rash. No pruritis. No diarrhea.  His restless leg syndrome and insomnia are improving. He is taking his folate supplement  and copper supplement. Currently taking 5mg prednisone every other day. Taper ends 12/9/24.    PAST MEDICAL HISTORY:   Past Medical History:   Diagnosis Date    Anticoagulant long-term use     Coronary artery disease     Hypertension      Myelodysplastic syndrome     Peripheral vascular disease, unspecified        PAST SURGICAL HISTORY:   Past Surgical History:   Procedure Laterality Date    BONE MARROW BIOPSY Left 2023    Procedure: Biopsy-bone marrow;  Surgeon: Harry Diamond MD;  Location: [facility] OR;  Service: Oncology;  Laterality: Left;    COLONOSCOPY N/A 2022    Procedure: COLONOSCOPY Golytely Vaccinated will bring cards;  Surgeon: Dereje Simon MD;  Location: [facility] ENDO;  Service: Endoscopy;  Laterality: N/A;  Do not cancel this order    INSERTION OF LEMONS CATHETER Right 2024    Procedure: INSERTION, CATHETER, CENTRAL VENOUS, LEMONS TRIPLE LUMEN;  Surgeon: Kg Patten MD;  Location: [facility] OR [address] [place] [address];  Service: General;  Laterality: Right;     PAST SOCIAL HISTORY:  Social History     Socioeconomic History    Marital status:    Tobacco Use    Smoking status: Former     Current packs/day: 0.00     Average packs/day: 0.3 packs/day for 50.0 years (12.5 ttl pk-yrs)     Types: Cigarettes     Start date: 3/1/1973     Quit date: 3/1/2023     Years since quittin.7     Passive exposure: Past    Smokeless tobacco: Never   Substance and Sexual Activity    Alcohol use: Not Currently    Drug use: Never    Sexual activity: Not Currently     Partners: Female     Social Drivers of Health     Financial Resource Strain: Low Risk  (2024)    Overall Financial Resource Strain (CARDIA)     Difficulty of Paying Living Expenses: Not hard at all   Food Insecurity: No Food Insecurity (2024)    Hunger Vital Sign     Worried About Running Out of Food in the Last Year: Never true     Ran Out of Food in the Last Year: Never true   Transportation Needs: No Transportation Needs (2024)    TRANSPORTATION NEEDS     Transportation : No   Physical Activity: Insufficiently Active (2024)    Exercise Vital Sign     Days of Exercise per Week: 2 days     Minutes of Exercise per Session: 60 min   Stress: Stress Concern  Present (9/30/2024)    [employer] [place] [employer] - Occupational Stress Questionnaire     Feeling of Stress : To some extent   Housing Stability: Low Risk  (9/30/2024)    Housing Stability Vital Sign     Unable to Pay for Housing in the Last Year: No     Homeless in the Last Year: No       FAMILY HISTORY:  Cancer-related family history includes Cancer in his brother and father.    CURRENT MEDICATIONS:   Medication List with Changes/Refills   Current Medications    ACYCLOVIR (ZOVIRAX) 800 MG TAB    Take 1 tablet (800 mg total) by mouth 2 (two) times daily.    BUDESONIDE (ENTOCORT EC) 3 MG CAPSULE    Take 1 capsule (3 mg total) by mouth 3 (three) times daily.    CARVEDILOL (COREG) 6.25 MG TABLET    Take 1 tablet (6.25 mg total) by mouth 2 (two) times daily.    COPPER GLUCONATE 2 MG CAP    Take 2 mg by mouth once daily.    FOLIC ACID (FOLVITE) 1 MG TABLET    Take 1 tablet (1 mg total) by mouth once daily.    GABAPENTIN (NEURONTIN) 300 MG CAPSULE    Take 2 capsules (600 mg total) by mouth every evening.    LETERMOVIR (PREVYMIS) 480 MG TAB    Take 1 tablet (480 mg total) by mouth Daily.    LIDOCAINE (LIDODERM) 5 %    Place 1 patch onto the skin once daily. Remove & Discard patch within 12 hours or as directed by MD. Place patch to left shoulder.    LOPERAMIDE (IMODIUM) 2 MG CAPSULE    Take 1 capsule (2 mg total) by mouth 2 (two) times daily as needed for Diarrhea.    MAGNESIUM OXIDE (MAG-OX) 400 MG (241.3 MG MAGNESIUM) TABLET    Take 2 tablets (800 mg total) by mouth 2 (two) times daily.    ONDANSETRON (ZOFRAN-ODT) 8 MG TBDL    Take 1 tablet (8 mg total) by mouth every 8 (eight) hours as needed (nausea/vomiting).    PANTOPRAZOLE (PROTONIX) 40 MG TABLET    Take 1 tablet (40 mg total) by mouth once daily.    POSACONAZOLE (NOXAFIL) 100 MG TBEC TABLET    Take 3 tablets (300 mg total) by mouth once daily.    PREDNISONE (DELTASONE) 10 MG TABLET    Take 3 tablets (30 mg total) by mouth once daily.     PROCHLORPERAZINE (COMPAZINE) 5 MG TABLET    Take 1 tablet (5 mg total) by mouth 4 (four) times daily as needed for Nausea.    ROPINIROLE (REQUIP) 0.5 MG TABLET    Take 1 tablet (0.5 mg total) by mouth every evening.    SULFAMETHOXAZOLE-TRIMETHOPRIM 800-160MG (BACTRIM DS) 800-160 MG TAB    Take 1 tablet by mouth every Mon, Wed, Fri. START 10/21/24    TACROLIMUS (PROGRAF) 0.5 MG CAP    Take 1 capsule (0.5 mg total) by mouth every morning AND 1 capsule (0.5 mg total) every evening.    TRAMADOL (ULTRAM) 50 MG TABLET    Take 1 tablet (50 mg total) by mouth every 6 (six) hours as needed for Pain.    ZOLPIDEM (AMBIEN) 5 MG TAB    Take 1 tablet (5 mg total) by mouth nightly as needed (insomnia).       ALLERGIES:   Review of patient's allergies indicates:  No Known Allergies    GVHD Review of Systems:     Pertinent positives and negatives included in the HPI. Otherwise a 14 point review of systems is negative. GVHD review of systems recorded in BMT flowsheet.     Physical Exam:     Vitals:    12/05/24 0859   BP: 133/69   Pulse: 74   Temp: 97.6 °F (36.4 °C)             General: Appears well, NAD.   HEENT: MMM, no OP lesions  Pulmonary: CTAB, no increased work of breathing, no W/R/C  Cardiovascular: S1S2 normal, RRR, no M/R/G  Abdominal: Soft, NT, ND, BS+, no HSM  Extremities: No C/C/E  Neurological: AAOx4, grossly normal, no focal deficits  Dermatologic: No appreciable rashes or lesions    ECOG Performance Status: (foot note - ECOG PS provided by Eastern Cooperative Oncology Group) 1 - Symptomatic but completely ambulatory    Karnofsky Performance Score:  80%- Normal Activity with Effort: Some Symptoms of Disease    Labs:   Lab Results   Component Value Date    WBC 2.76 (L) 12/05/2024    HGB 11.8 (L) 12/05/2024    HCT 35.2 (L) 12/05/2024     (H) 12/05/2024    PLT 26 (LL) 12/05/2024        CMP  Sodium   Date Value Ref Range Status   12/05/2024 141 136 - 145 mmol/L Final     Potassium   Date Value Ref Range Status    12/05/2024 4.2 3.5 - 5.1 mmol/L Final     Chloride   Date Value Ref Range Status   12/05/2024 107 95 - 110 mmol/L Final     CO2   Date Value Ref Range Status   12/05/2024 25 23 - 29 mmol/L Final     Glucose   Date Value Ref Range Status   12/05/2024 146 (H) 70 - 110 mg/dL Final     BUN   Date Value Ref Range Status   12/05/2024 12 8 - 23 mg/dL Final     Creatinine   Date Value Ref Range Status   12/05/2024 0.7 0.5 - 1.4 mg/dL Final     Calcium   Date Value Ref Range Status   12/05/2024 9.0 8.7 - 10.5 mg/dL Final     Total Protein   Date Value Ref Range Status   12/05/2024 5.5 (L) 6.0 - 8.4 g/dL Final     Albumin   Date Value Ref Range Status   12/05/2024 3.4 (L) 3.5 - 5.2 g/dL Final     Total Bilirubin   Date Value Ref Range Status   12/05/2024 0.4 0.1 - 1.0 mg/dL Final     Comment:     For infants and newborns, interpretation of results should be based  on gestational age, weight and in agreement with clinical  observations.    Premature Infant recommended reference ranges:  Up to 24 hours.............<8.0 mg/dL  Up to 48 hours............<12.0 mg/dL  3-5 days..................<15.0 mg/dL  6-29 days.................<15.0 mg/dL       Alkaline Phosphatase   Date Value Ref Range Status   12/05/2024 100 40 - 150 U/L Final     AST   Date Value Ref Range Status   12/05/2024 18 10 - 40 U/L Final     ALT   Date Value Ref Range Status   12/05/2024 38 10 - 44 U/L Final     Anion Gap   Date Value Ref Range Status   12/05/2024 9 8 - 16 mmol/L Final     eGFR   Date Value Ref Range Status   12/05/2024 >60.0 >60 mL/min/1.73 m^2 Final          Imaging:   Hospital imaging reviewed.    Pathology:  Prior pathology reviewed. Plan for day +30 bone marrow biopsy on 10/23/24    Acute GVHD Scoring:  GVHD Acute Assessment  Skin: No skin acute Gsfyg-rmoprr-Djvz Disease/rash not attributable to acute Umyqq-fnhkfs-Jaoq Disease  Upper Intestinal Tract: Stage 0 - No persistant nausea or vomitingNo GVHD at this time. Unable to record via  flowsheets.  Liver: No liver acute Taupg-wmqgpx-Tgnx Disease/bilirubin level not attributable to acute Zcqzr-iasfzp-Upfx Disease     Overall Grade (Przepiorka): 0 - No stage 1-4 of any organ.      Assessment and Plan:   Guillaume Salinas (Guillaume) is a pleasant 69 y.o.male with a past medical history of MDS s/p haplo SCT who presents for post-transplant follow up.    MDS: Status post treatment with azacitidine 75 mg/m2 daily x7 days plus venetoclax 100mg (voriconazole) daily x14 of 28 days.Venetoclax decreased to 7 days with cycle 3 until completion of 11 cycles. No plans for maintenance at this time.     Status post allogeneic stem cell transplantation: As noted above, status post haploidentical stem cell transplantation conditioned with FluMel 100 + PTCy+ 2Gy TBI . Currently Day+ 77. Engrafted on 10/09/24 day +20.  Day 30 bone marrow (11/5/2024) showing mildly hypercellular marrow with no increased blast (0.3%) and no evidence of myeloid neoplasm. Pending chimerisms, NGS, and CG  Graft versus host disease: GVHD prophylaxis with Post-transplant cyclophosphamide, Tacrolimus, MMF (MMF d/c on D+35). Persistent nausea despite anti-emetics, reducing pill burden. 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 He started budesonide 3mg TID on 10/28/24. Due to persistent nausea despite budesonide so started systemic steroids 11/1 at 0.5 mg/kg and his steroid taper has been given to him.   Current tacro dose: 0.5 mg BID  Last tacro level: 5.5  Adjustments: increase to 1mg BID  Steroid tapering schedule is below  Date               Steroid Dose  11/04 - 11/10  Take 3 tablets (30 mg) by mouth once daily  11/11 - 11/17  Take 2 tablets (20 mg) by mouth once daily  11/18 - 11/24  Take 1 tablet (10 mg) by mouth once daily  11/25 - 12/01  Take ½ tablet (5 mg) by mouth once daily.  12/02 - 12/08  Take ½ tablet (5 mg) by mouth every other day   12/09   STOP     Immunosuppression: Prevention with posaconazole,  acyclovir. CMV prophylaxis with letermovir. PJP prophyalxis Bactrim MWF. Continue weekly monitoring of CMV and EBV.  Last CMV: Not-detected, last EBV: Not-detected.   Active infections: N/A    Pancytopenia: Due to underlying disease and chemotherapy. Transfuseu for Hgb <7 g/dL and platelets <10k. Folate and copper deficient, on supplementation. Will continue to monitor to see if platelets begin to rise with his supplements.     Folic Acid deficiency   Folic acid borderline low at 4.3, taking folic acid 1mg daily     Copper deficiency   Copper level of 635, has started daily OTC Copper supplement yesterday.     Hypomagnesemia: Related to tacro, poor po intake. 1.5 today.   Continue MagOx to 800mg twice daily.      Chemotherapy Induced Nausea: Resolved with steroids. Tapering as above.     BK Virus: Patient does not have anymore pain or hesitancy with urination. He denies any blood. He is still experiencing some frequency. Will continue hydration efforts.      Anxiety, Restless Leg Syndrome: Noted since discharge by family. He started ropinirole 0.5mg and has experienced significant improvement.    Insomnia: Patient with significant improvement. Continue Ambien and Ropinirole for RLS.     Follow up: Twice weekly follow up with labs.    Orders Placed:           Medical Complexity:   Visit today included increased complexity associated with the care of the episodic problems addressed above and managing the longitudinal care of the patient due to the serious and/or complex managed problem(s) history of allo SCT.      Follow Up:      Twice weekly follow up as scheduled.       A total of 30 minutes was spent in pre-visit chart review, personal interpretation of labs and imaging, and medication review. Total visit time 40 minutes, >50 % counseling.         Gilma Ugalde PA-C  Malignant Hematology, Stem Cell Transplant, and Cellular Therapy  [facility]  [facility] [place]

## 2024-12-05 ENCOUNTER — INFUSION (OUTPATIENT)
Dept: INFUSION THERAPY | Facility: HOSPITAL | Age: 69
End: 2024-12-05
Payer: MEDICARE

## 2024-12-05 ENCOUNTER — PATIENT MESSAGE (OUTPATIENT)
Dept: HEMATOLOGY/ONCOLOGY | Facility: CLINIC | Age: 69
End: 2024-12-05

## 2024-12-05 ENCOUNTER — TELEPHONE (OUTPATIENT)
Dept: HEMATOLOGY/ONCOLOGY | Facility: CLINIC | Age: 69
End: 2024-12-05

## 2024-12-05 ENCOUNTER — OFFICE VISIT (OUTPATIENT)
Dept: HEMATOLOGY/ONCOLOGY | Facility: CLINIC | Age: 69
End: 2024-12-05
Payer: MEDICARE

## 2024-12-05 VITALS
BODY MASS INDEX: 20.63 KG/M2 | SYSTOLIC BLOOD PRESSURE: 133 MMHG | TEMPERATURE: 98 F | HEART RATE: 74 BPM | OXYGEN SATURATION: 99 % | WEIGHT: 139.31 LBS | DIASTOLIC BLOOD PRESSURE: 69 MMHG | HEIGHT: 69 IN

## 2024-12-05 DIAGNOSIS — Z76.82 STEM CELL TRANSPLANT CANDIDATE: ICD-10-CM

## 2024-12-05 DIAGNOSIS — G47.00 INSOMNIA, UNSPECIFIED TYPE: ICD-10-CM

## 2024-12-05 DIAGNOSIS — E53.8 FOLIC ACID DEFICIENCY: ICD-10-CM

## 2024-12-05 DIAGNOSIS — E61.0 COPPER DEFICIENCY: ICD-10-CM

## 2024-12-05 DIAGNOSIS — D46.9 MYELODYSPLASTIC SYNDROME: Primary | ICD-10-CM

## 2024-12-05 DIAGNOSIS — D46.9 MYELODYSPLASTIC SYNDROME: ICD-10-CM

## 2024-12-05 DIAGNOSIS — G25.81 RESTLESS LEG SYNDROME: ICD-10-CM

## 2024-12-05 DIAGNOSIS — B34.8 BK VIREMIA: ICD-10-CM

## 2024-12-05 DIAGNOSIS — E83.42 HYPOMAGNESEMIA: ICD-10-CM

## 2024-12-05 DIAGNOSIS — Z94.81 S/P ALLOGENEIC BONE MARROW TRANSPLANT: ICD-10-CM

## 2024-12-05 DIAGNOSIS — T45.1X5A CHEMOTHERAPY-INDUCED NAUSEA: ICD-10-CM

## 2024-12-05 DIAGNOSIS — D84.81 IMMUNODEFICIENCY DUE TO CONDITIONS CLASSIFIED ELSEWHERE: ICD-10-CM

## 2024-12-05 DIAGNOSIS — D61.818 PANCYTOPENIA: ICD-10-CM

## 2024-12-05 DIAGNOSIS — R11.0 CHEMOTHERAPY-INDUCED NAUSEA: ICD-10-CM

## 2024-12-05 LAB
ABO + RH BLD: NORMAL
ALBUMIN SERPL BCP-MCNC: 3.4 G/DL (ref 3.5–5.2)
ALP SERPL-CCNC: 100 U/L (ref 40–150)
ALT SERPL W/O P-5'-P-CCNC: 38 U/L (ref 10–44)
ANION GAP SERPL CALC-SCNC: 9 MMOL/L (ref 8–16)
AST SERPL-CCNC: 18 U/L (ref 10–40)
BASOPHILS # BLD AUTO: 0 K/UL (ref 0–0.2)
BASOPHILS NFR BLD: 0 % (ref 0–1.9)
BILIRUB SERPL-MCNC: 0.4 MG/DL (ref 0.1–1)
BLD GP AB SCN CELLS X3 SERPL QL: NORMAL
BUN SERPL-MCNC: 12 MG/DL (ref 8–23)
CALCIUM SERPL-MCNC: 9 MG/DL (ref 8.7–10.5)
CHLORIDE SERPL-SCNC: 107 MMOL/L (ref 95–110)
CMV DNA SPEC QL NAA+PROBE: NORMAL
CO2 SERPL-SCNC: 25 MMOL/L (ref 23–29)
CREAT SERPL-MCNC: 0.7 MG/DL (ref 0.5–1.4)
CYTOMEGALOVIRUS PCR, QUANT: NOT DETECTED IU/ML
DIFFERENTIAL METHOD BLD: ABNORMAL
EOSINOPHIL # BLD AUTO: 0 K/UL (ref 0–0.5)
EOSINOPHIL NFR BLD: 0.4 % (ref 0–8)
EPSTEIN-BARR VIRUS DNA: NORMAL
EPSTEIN-BARR VIRUS PCR, QUANT: NOT DETECTED IU/ML
ERYTHROCYTE [DISTWIDTH] IN BLOOD BY AUTOMATED COUNT: 22 % (ref 11.5–14.5)
EST. GFR  (NO RACE VARIABLE): >60 ML/MIN/1.73 M^2
GLUCOSE SERPL-MCNC: 146 MG/DL (ref 70–110)
HCT VFR BLD AUTO: 35.2 % (ref 40–54)
HGB BLD-MCNC: 11.8 G/DL (ref 14–18)
IMM GRANULOCYTES # BLD AUTO: 0.02 K/UL (ref 0–0.04)
IMM GRANULOCYTES NFR BLD AUTO: 0.7 % (ref 0–0.5)
LYMPHOCYTES # BLD AUTO: 0.3 K/UL (ref 1–4.8)
LYMPHOCYTES NFR BLD: 9.8 % (ref 18–48)
MAGNESIUM SERPL-MCNC: 2 MG/DL (ref 1.6–2.6)
MCH RBC QN AUTO: 37.3 PG (ref 27–31)
MCHC RBC AUTO-ENTMCNC: 33.5 G/DL (ref 32–36)
MCV RBC AUTO: 111 FL (ref 82–98)
MONOCYTES # BLD AUTO: 0.2 K/UL (ref 0.3–1)
MONOCYTES NFR BLD: 6.5 % (ref 4–15)
NEUTROPHILS # BLD AUTO: 2.3 K/UL (ref 1.8–7.7)
NEUTROPHILS NFR BLD: 82.6 % (ref 38–73)
NRBC BLD-RTO: 0 /100 WBC
PHOSPHATE SERPL-MCNC: 2.6 MG/DL (ref 2.7–4.5)
PLATELET # BLD AUTO: 26 K/UL (ref 150–450)
PMV BLD AUTO: 10.7 FL (ref 9.2–12.9)
POTASSIUM SERPL-SCNC: 4.2 MMOL/L (ref 3.5–5.1)
PROT SERPL-MCNC: 5.5 G/DL (ref 6–8.4)
RBC # BLD AUTO: 3.16 M/UL (ref 4.6–6.2)
SODIUM SERPL-SCNC: 141 MMOL/L (ref 136–145)
SPECIMEN OUTDATE: NORMAL
TACROLIMUS BLD-MCNC: 5.5 NG/ML (ref 5–15)
WBC # BLD AUTO: 2.76 K/UL (ref 3.9–12.7)

## 2024-12-05 PROCEDURE — 84100 ASSAY OF PHOSPHORUS: CPT | Performed by: INTERNAL MEDICINE

## 2024-12-05 PROCEDURE — 99999 PR PBB SHADOW E&M-EST. PATIENT-LVL IV: CPT | Mod: PBBFAC,,,

## 2024-12-05 PROCEDURE — 87799 DETECT AGENT NOS DNA QUANT: CPT | Performed by: INTERNAL MEDICINE

## 2024-12-05 PROCEDURE — 85025 COMPLETE CBC W/AUTO DIFF WBC: CPT | Performed by: INTERNAL MEDICINE

## 2024-12-05 PROCEDURE — 63600175 PHARM REV CODE 636 W HCPCS: Performed by: INTERNAL MEDICINE

## 2024-12-05 PROCEDURE — 86850 RBC ANTIBODY SCREEN: CPT | Performed by: INTERNAL MEDICINE

## 2024-12-05 PROCEDURE — 83735 ASSAY OF MAGNESIUM: CPT | Performed by: INTERNAL MEDICINE

## 2024-12-05 PROCEDURE — 80053 COMPREHEN METABOLIC PANEL: CPT | Performed by: INTERNAL MEDICINE

## 2024-12-05 PROCEDURE — 36592 COLLECT BLOOD FROM PICC: CPT

## 2024-12-05 PROCEDURE — 25000003 PHARM REV CODE 250: Performed by: INTERNAL MEDICINE

## 2024-12-05 PROCEDURE — 80197 ASSAY OF TACROLIMUS: CPT | Performed by: INTERNAL MEDICINE

## 2024-12-05 PROCEDURE — A4216 STERILE WATER/SALINE, 10 ML: HCPCS | Performed by: INTERNAL MEDICINE

## 2024-12-05 RX ORDER — HEPARIN 100 UNIT/ML
500 SYRINGE INTRAVENOUS
OUTPATIENT
Start: 2024-12-05

## 2024-12-05 RX ORDER — TACROLIMUS 0.5 MG/1
CAPSULE ORAL
Qty: 120 CAPSULE | Refills: 11 | Status: SHIPPED | OUTPATIENT
Start: 2024-12-05 | End: 2025-12-05

## 2024-12-05 RX ORDER — HEPARIN 100 UNIT/ML
500 SYRINGE INTRAVENOUS
Status: DISCONTINUED | OUTPATIENT
Start: 2024-12-05 | End: 2024-12-05 | Stop reason: HOSPADM

## 2024-12-05 RX ORDER — SODIUM CHLORIDE 0.9 % (FLUSH) 0.9 %
10 SYRINGE (ML) INJECTION
OUTPATIENT
Start: 2024-12-05

## 2024-12-05 RX ORDER — SODIUM CHLORIDE 0.9 % (FLUSH) 0.9 %
10 SYRINGE (ML) INJECTION
Status: DISCONTINUED | OUTPATIENT
Start: 2024-12-05 | End: 2024-12-05 | Stop reason: HOSPADM

## 2024-12-05 RX ADMIN — Medication 10 ML: at 08:12

## 2024-12-05 RX ADMIN — HEPARIN 500 UNITS: 100 SYRINGE at 08:12

## 2024-12-08 NOTE — PROGRESS NOTES
Section of Hematology and Stem Cell Transplantation    Post-Transplantation Follow Up Visit     12/08/2024    Transplant History:   Primary oncologist: Paco Hickey MD  Primary oncologic diagnosis: MDS  Transplant date: 9/19/2024  Donor: haploidentical  Blood Type (Patient): B +  Blood Type (Donor): A +  CMV (Patient): Positive  CMV (Donor): Positive  Graft source: Bone marrow  CD34+ cell dose: 3.55x10^6  Conditioning Regimen: Fludarabine plus melphalan 100 mg/m2 + 2Gy TBI  GVHD prophylaxis: Post-transplant cyclophosphamide, Tacrolimus, MMF  Immediate post-transplant complications: Patient experienced expected GI toxicities with C diff negative diarrhea and nausea, neutropenic fever with negative infectious work up, expected cytopenias requiring transfusions, electrolyte abnormalities requiring replacement, volume overload requiring diuresis, intermittent SOB from pulmonary edema requiring 02, and a hemorrhoid flare up. Diarrhea and SOB improved towards the end of the hospital stay.     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 Offered  through language services, declined - wife assisted.     Interval History: Patient presents for routine follow-up. He reports doing well with improved appetite with him eating three full meals daily with snacks without no nausea/emesis. No bleeding/bruising. No rash. No pruritis. No diarrhea.  His restless leg syndrome and insomnia are improving. He is taking his folate supplement  and copper supplement. Prednisone taper completed.    PAST MEDICAL HISTORY:   Past Medical History:   Diagnosis Date    Anticoagulant long-term use     Coronary artery disease     Hypertension     Myelodysplastic syndrome     Peripheral  vascular disease, unspecified        PAST SURGICAL HISTORY:   Past Surgical History:   Procedure Laterality Date    BONE MARROW BIOPSY Left 2023    Procedure: Biopsy-bone marrow;  Surgeon: Harry Diamond MD;  Location: [facility] OR;  Service: Oncology;  Laterality: Left;    COLONOSCOPY N/A 2022    Procedure: COLONOSCOPY Golytely Vaccinated will bring cards;  Surgeon: Dereje Simon MD;  Location: [facility] ENDO;  Service: Endoscopy;  Laterality: N/A;  Do not cancel this order    INSERTION OF LEMONS CATHETER Right 2024    Procedure: INSERTION, CATHETER, CENTRAL VENOUS, LEMONS TRIPLE LUMEN;  Surgeon: Kg Patten MD;  Location: [facility] OR [address] [place] [address];  Service: General;  Laterality: Right;     PAST SOCIAL HISTORY:  Social History     Socioeconomic History    Marital status:    Tobacco Use    Smoking status: Former     Current packs/day: 0.00     Average packs/day: 0.3 packs/day for 50.0 years (12.5 ttl pk-yrs)     Types: Cigarettes     Start date: 3/1/1973     Quit date: 3/1/2023     Years since quittin.7     Passive exposure: Past    Smokeless tobacco: Never   Substance and Sexual Activity    Alcohol use: Not Currently    Drug use: Never    Sexual activity: Not Currently     Partners: Female     Social Drivers of Health     Financial Resource Strain: Low Risk  (2024)    Overall Financial Resource Strain (CARDIA)     Difficulty of Paying Living Expenses: Not hard at all   Food Insecurity: No Food Insecurity (2024)    Hunger Vital Sign     Worried About Running Out of Food in the Last Year: Never true     Ran Out of Food in the Last Year: Never true   Transportation Needs: No Transportation Needs (2024)    TRANSPORTATION NEEDS     Transportation : No   Physical Activity: Insufficiently Active (2024)    Exercise Vital Sign     Days of Exercise per Week: 2 days     Minutes of Exercise per Session: 60 min   Stress: Stress Concern Present (2024)    [employer] - Occupational Stress Questionnaire     Feeling of Stress : To some extent   Housing Stability: Low Risk  (9/30/2024)    Housing Stability Vital Sign     Unable to Pay for Housing in the Last Year: No     Homeless in the Last Year: No       FAMILY HISTORY:  Cancer-related family history includes Cancer in his brother and father.    CURRENT MEDICATIONS:   Medication List with Changes/Refills   Current Medications    ACYCLOVIR (ZOVIRAX) 800 MG TAB    Take 1 tablet (800 mg total) by mouth 2 (two) times daily.    BUDESONIDE (ENTOCORT EC) 3 MG CAPSULE    Take 1 capsule (3 mg total) by mouth 3 (three) times daily.    CARVEDILOL (COREG) 6.25 MG TABLET    Take 1 tablet (6.25 mg total) by mouth 2 (two) times daily.    COPPER GLUCONATE 2 MG CAP    Take 2 mg by mouth once daily.    FOLIC ACID (FOLVITE) 1 MG TABLET    Take 1 tablet (1 mg total) by mouth once daily.    GABAPENTIN (NEURONTIN) 300 MG CAPSULE    Take 2 capsules (600 mg total) by mouth every evening.    LETERMOVIR (PREVYMIS) 480 MG TAB    Take 1 tablet (480 mg total) by mouth Daily.    LIDOCAINE (LIDODERM) 5 %    Place 1 patch onto the skin once daily. Remove & Discard patch within 12 hours or as directed by MD. Place patch to left shoulder.    LOPERAMIDE (IMODIUM) 2 MG CAPSULE    Take 1 capsule (2 mg total) by mouth 2 (two) times daily as needed for Diarrhea.    MAGNESIUM OXIDE (MAG-OX) 400 MG (241.3 MG MAGNESIUM) TABLET    Take 2 tablets (800 mg total) by mouth 2 (two) times daily.    ONDANSETRON (ZOFRAN-ODT) 8 MG TBDL    Take 1 tablet (8 mg total) by mouth every 8 (eight) hours as needed (nausea/vomiting).    PANTOPRAZOLE (PROTONIX) 40 MG TABLET    Take 1 tablet (40 mg total) by mouth once daily.    POSACONAZOLE (NOXAFIL) 100 MG TBEC TABLET    Take 3 tablets (300 mg total) by mouth once daily.    PREDNISONE (DELTASONE) 10 MG TABLET    Take 3 tablets (30 mg total) by mouth once daily.    PROCHLORPERAZINE (COMPAZINE) 5 MG TABLET    Take 1  tablet (5 mg total) by mouth 4 (four) times daily as needed for Nausea.    ROPINIROLE (REQUIP) 0.5 MG TABLET    Take 1 tablet (0.5 mg total) by mouth every evening.    SULFAMETHOXAZOLE-TRIMETHOPRIM 800-160MG (BACTRIM DS) 800-160 MG TAB    Take 1 tablet by mouth every Mon, Wed, Fri. START 10/21/24    TACROLIMUS (PROGRAF) 0.5 MG CAP    Take 2 capsules (1 mg total) by mouth every morning AND 2 capsules (1 mg total) every evening.    TRAMADOL (ULTRAM) 50 MG TABLET    Take 1 tablet (50 mg total) by mouth every 6 (six) hours as needed for Pain.    ZOLPIDEM (AMBIEN) 5 MG TAB    Take 1 tablet (5 mg total) by mouth nightly as needed (insomnia).       ALLERGIES:   Review of patient's allergies indicates:  No Known Allergies    GVHD Review of Systems:     Pertinent positives and negatives included in the HPI. Otherwise a 14 point review of systems is negative. GVHD review of systems recorded in BMT flowsheet.     Physical Exam:     There were no vitals filed for this visit.            General: Appears well, NAD.   HEENT: MMM, no OP lesions  Pulmonary: CTAB, no increased work of breathing, no W/R/C  Cardiovascular: S1S2 normal, RRR, no M/R/G  Abdominal: Soft, NT, ND, BS+, no HSM  Extremities: No C/C/E  Neurological: AAOx4, grossly normal, no focal deficits  Dermatologic: No appreciable rashes or lesions    ECOG Performance Status: (foot note - ECOG PS provided by [employer] Cooperative Oncology Group) 1 - Symptomatic but completely ambulatory    Karnofsky Performance Score:  80%- Normal Activity with Effort: Some Symptoms of Disease    Labs:   Lab Results   Component Value Date    WBC 2.76 (L) 12/05/2024    HGB 11.8 (L) 12/05/2024    HCT 35.2 (L) 12/05/2024     (H) 12/05/2024    PLT 26 (LL) 12/05/2024        CMP  Sodium   Date Value Ref Range Status   12/05/2024 141 136 - 145 mmol/L Final     Potassium   Date Value Ref Range Status   12/05/2024 4.2 3.5 - 5.1 mmol/L Final     Chloride   Date Value Ref Range Status    12/05/2024 107 95 - 110 mmol/L Final     CO2   Date Value Ref Range Status   12/05/2024 25 23 - 29 mmol/L Final     Glucose   Date Value Ref Range Status   12/05/2024 146 (H) 70 - 110 mg/dL Final     BUN   Date Value Ref Range Status   12/05/2024 12 8 - 23 mg/dL Final     Creatinine   Date Value Ref Range Status   12/05/2024 0.7 0.5 - 1.4 mg/dL Final     Calcium   Date Value Ref Range Status   12/05/2024 9.0 8.7 - 10.5 mg/dL Final     Total Protein   Date Value Ref Range Status   12/05/2024 5.5 (L) 6.0 - 8.4 g/dL Final     Albumin   Date Value Ref Range Status   12/05/2024 3.4 (L) 3.5 - 5.2 g/dL Final     Total Bilirubin   Date Value Ref Range Status   12/05/2024 0.4 0.1 - 1.0 mg/dL Final     Comment:     For infants and newborns, interpretation of results should be based  on gestational age, weight and in agreement with clinical  observations.    Premature Infant recommended reference ranges:  Up to 24 hours.............<8.0 mg/dL  Up to 48 hours............<12.0 mg/dL  3-5 days..................<15.0 mg/dL  6-29 days.................<15.0 mg/dL       Alkaline Phosphatase   Date Value Ref Range Status   12/05/2024 100 40 - 150 U/L Final     AST   Date Value Ref Range Status   12/05/2024 18 10 - 40 U/L Final     ALT   Date Value Ref Range Status   12/05/2024 38 10 - 44 U/L Final     Anion Gap   Date Value Ref Range Status   12/05/2024 9 8 - 16 mmol/L Final     eGFR   Date Value Ref Range Status   12/05/2024 >60.0 >60 mL/min/1.73 m^2 Final          Imaging:   Hospital imaging reviewed.    Pathology:  Prior pathology reviewed. Plan for day +30 bone marrow biopsy on 10/23/24    Acute GVHD Scoring:  GVHD Acute Assessment      No GVHD at this time. Unable to record via flowsheets.               Assessment and Plan:   Guillaume Salinas (Guillaume) is a pleasant 69 y.o.male with a past medical history of MDS s/p haplo SCT who presents for post-transplant follow up.    MDS: Status post treatment with azacitidine 75  mg/m2 daily x7 days plus venetoclax 100mg (voriconazole) daily x14 of 28 days.Venetoclax decreased to 7 days with cycle 3 until completion of 11 cycles. No plans for maintenance at this time.     Status post allogeneic stem cell transplantation: As noted above, status post haploidentical stem cell transplantation conditioned with FluMel 100 + PTCy+ 2Gy TBI . Currently Day+ 80. Engrafted on 10/09/24 day +20.  Day 30 bone marrow (11/5/2024) showing mildly hypercellular marrow with no increased blast (0.3%) and no evidence of myeloid neoplasm. Pending chimerisms, NGS, and CG  Graft versus host disease: GVHD prophylaxis with Post-transplant cyclophosphamide, Tacrolimus, MMF (MMF d/c on D+35). Persistent nausea despite anti-emetics, reducing pill burden. 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 He started budesonide 3mg TID on 10/28/24. Due to persistent nausea despite budesonide so started systemic steroids 11/1 at 0.5 mg/kg and his steroid taper has been given to him. Steroid taper completed 12/8/24.  Current tacro dose: 1mg mg BID  Last tacro level: 5.5  Adjustments:    Immunosuppression: Prevention with posaconazole, acyclovir. CMV prophylaxis with letermovir. PJP prophyalxis Bactrim MWF. Continue weekly monitoring of CMV and EBV.  Last CMV: Not-detected, last EBV: Not-detected.   Active infections: N/A    Pancytopenia: Due to underlying disease and chemotherapy. Transfuseu for Hgb <7 g/dL and platelets <10k. Folate and copper deficient, on supplementation. Will continue to monitor to see if platelets begin to rise with his supplements.     Folic Acid deficiency   Folic acid borderline low at 4.3, taking folic acid 1mg daily     Copper deficiency   Copper level of 635, has started daily OTC Copper supplement yesterday.     Hypomagnesemia: Related to tacro, poor po intake. 1.5 today.   Continue MagOx to 800mg twice daily.      Chemotherapy Induced Nausea: Resolved with steroids. Tapering as above.     BK  Virus: Patient does not have anymore pain or hesitancy with urination. He denies any blood. He is still experiencing some frequency. Will continue hydration efforts.      Anxiety, Restless Leg Syndrome: Noted since discharge by family. He started ropinirole 0.5mg and has experienced significant improvement.    Insomnia: Patient with significant improvement. Continue Ambien and Ropinirole for RLS.     Follow up: Twice weekly follow up with labs.    Orders Placed:           Medical Complexity:   Visit today included increased complexity associated with the care of the episodic problems addressed above and managing the longitudinal care of the patient due to the serious and/or complex managed problem(s) history of allo SCT.      Follow Up:      Twice weekly follow up as scheduled.       A total of 30 minutes was spent in pre-visit chart review, personal interpretation of labs and imaging, and medication review. Total visit time 40 minutes, >50 % counseling.         Gilma Ugalde PA-C  Malignant Hematology, Stem Cell Transplant, and Cellular Therapy  [facility]  [facility] [place]

## 2024-12-09 ENCOUNTER — INFUSION (OUTPATIENT)
Dept: INFUSION THERAPY | Facility: HOSPITAL | Age: 69
End: 2024-12-09
Payer: MEDICARE

## 2024-12-09 ENCOUNTER — DOCUMENTATION ONLY (OUTPATIENT)
Dept: HEMATOLOGY/ONCOLOGY | Facility: CLINIC | Age: 69
End: 2024-12-09

## 2024-12-09 ENCOUNTER — OFFICE VISIT (OUTPATIENT)
Dept: HEMATOLOGY/ONCOLOGY | Facility: CLINIC | Age: 69
End: 2024-12-09
Payer: MEDICARE

## 2024-12-09 ENCOUNTER — TELEPHONE (OUTPATIENT)
Dept: HEMATOLOGY/ONCOLOGY | Facility: CLINIC | Age: 69
End: 2024-12-09

## 2024-12-09 ENCOUNTER — PATIENT MESSAGE (OUTPATIENT)
Dept: HEMATOLOGY/ONCOLOGY | Facility: CLINIC | Age: 69
End: 2024-12-09

## 2024-12-09 ENCOUNTER — CLINICAL SUPPORT (OUTPATIENT)
Dept: HEMATOLOGY/ONCOLOGY | Facility: CLINIC | Age: 69
End: 2024-12-09
Payer: MEDICARE

## 2024-12-09 VITALS
WEIGHT: 142.63 LBS | HEART RATE: 82 BPM | BODY MASS INDEX: 21.13 KG/M2 | TEMPERATURE: 98 F | SYSTOLIC BLOOD PRESSURE: 134 MMHG | HEIGHT: 69 IN | DIASTOLIC BLOOD PRESSURE: 74 MMHG | OXYGEN SATURATION: 98 %

## 2024-12-09 DIAGNOSIS — D46.9 MYELODYSPLASTIC SYNDROME: ICD-10-CM

## 2024-12-09 DIAGNOSIS — Z94.81 S/P ALLOGENEIC BONE MARROW TRANSPLANT: Primary | ICD-10-CM

## 2024-12-09 DIAGNOSIS — D84.81 IMMUNODEFICIENCY DUE TO CONDITIONS CLASSIFIED ELSEWHERE: ICD-10-CM

## 2024-12-09 DIAGNOSIS — Z76.82 STEM CELL TRANSPLANT CANDIDATE: ICD-10-CM

## 2024-12-09 DIAGNOSIS — D69.3 IDIOPATHIC THROMBOCYTOPENIC PURPURA (ITP): ICD-10-CM

## 2024-12-09 DIAGNOSIS — D61.818 PANCYTOPENIA: ICD-10-CM

## 2024-12-09 DIAGNOSIS — D46.9 MYELODYSPLASTIC SYNDROME: Primary | ICD-10-CM

## 2024-12-09 LAB
ABO + RH BLD: NORMAL
ALBUMIN SERPL BCP-MCNC: 3.2 G/DL (ref 3.5–5.2)
ALP SERPL-CCNC: 92 U/L (ref 40–150)
ALT SERPL W/O P-5'-P-CCNC: 43 U/L (ref 10–44)
ANION GAP SERPL CALC-SCNC: 9 MMOL/L (ref 8–16)
AST SERPL-CCNC: 25 U/L (ref 10–40)
BASOPHILS # BLD AUTO: 0 K/UL (ref 0–0.2)
BASOPHILS NFR BLD: 0 % (ref 0–1.9)
BILIRUB SERPL-MCNC: 0.4 MG/DL (ref 0.1–1)
BLD GP AB SCN CELLS X3 SERPL QL: NORMAL
BUN SERPL-MCNC: 20 MG/DL (ref 8–23)
CALCIUM SERPL-MCNC: 9 MG/DL (ref 8.7–10.5)
CHLORIDE SERPL-SCNC: 108 MMOL/L (ref 95–110)
CMV DNA SPEC QL NAA+PROBE: NORMAL
CO2 SERPL-SCNC: 26 MMOL/L (ref 23–29)
CREAT SERPL-MCNC: 0.7 MG/DL (ref 0.5–1.4)
CYTOMEGALOVIRUS PCR, QUANT: NOT DETECTED IU/ML
DIFFERENTIAL METHOD BLD: ABNORMAL
EOSINOPHIL # BLD AUTO: 0 K/UL (ref 0–0.5)
EOSINOPHIL NFR BLD: 0 % (ref 0–8)
EPSTEIN-BARR VIRUS DNA: NORMAL
EPSTEIN-BARR VIRUS PCR, QUANT: NOT DETECTED IU/ML
ERYTHROCYTE [DISTWIDTH] IN BLOOD BY AUTOMATED COUNT: 21.2 % (ref 11.5–14.5)
EST. GFR  (NO RACE VARIABLE): >60 ML/MIN/1.73 M^2
GLUCOSE SERPL-MCNC: 127 MG/DL (ref 70–110)
HCT VFR BLD AUTO: 33.3 % (ref 40–54)
HGB BLD-MCNC: 10.9 G/DL (ref 14–18)
IMM GRANULOCYTES # BLD AUTO: 0.04 K/UL (ref 0–0.04)
IMM GRANULOCYTES NFR BLD AUTO: 1.9 % (ref 0–0.5)
LYMPHOCYTES # BLD AUTO: 0.3 K/UL (ref 1–4.8)
LYMPHOCYTES NFR BLD: 12.1 % (ref 18–48)
MAGNESIUM SERPL-MCNC: 1.6 MG/DL (ref 1.6–2.6)
MCH RBC QN AUTO: 37.1 PG (ref 27–31)
MCHC RBC AUTO-ENTMCNC: 32.7 G/DL (ref 32–36)
MCV RBC AUTO: 113 FL (ref 82–98)
MONOCYTES # BLD AUTO: 0.2 K/UL (ref 0.3–1)
MONOCYTES NFR BLD: 8.7 % (ref 4–15)
NEUTROPHILS # BLD AUTO: 1.6 K/UL (ref 1.8–7.7)
NEUTROPHILS NFR BLD: 77.3 % (ref 38–73)
NRBC BLD-RTO: 0 /100 WBC
PHOSPHATE SERPL-MCNC: 3.6 MG/DL (ref 2.7–4.5)
PLATELET # BLD AUTO: 20 K/UL (ref 150–450)
PMV BLD AUTO: 11 FL (ref 9.2–12.9)
POTASSIUM SERPL-SCNC: 4.3 MMOL/L (ref 3.5–5.1)
PROT SERPL-MCNC: 5.2 G/DL (ref 6–8.4)
RBC # BLD AUTO: 2.94 M/UL (ref 4.6–6.2)
SODIUM SERPL-SCNC: 143 MMOL/L (ref 136–145)
SPECIMEN OUTDATE: NORMAL
TACROLIMUS BLD-MCNC: 12.5 NG/ML (ref 5–15)
WBC # BLD AUTO: 2.06 K/UL (ref 3.9–12.7)

## 2024-12-09 PROCEDURE — 83735 ASSAY OF MAGNESIUM: CPT | Performed by: INTERNAL MEDICINE

## 2024-12-09 PROCEDURE — 87799 DETECT AGENT NOS DNA QUANT: CPT | Performed by: INTERNAL MEDICINE

## 2024-12-09 PROCEDURE — 99999 PR PBB SHADOW E&M-EST. PATIENT-LVL IV: CPT | Mod: PBBFAC,,, | Performed by: INTERNAL MEDICINE

## 2024-12-09 PROCEDURE — 80053 COMPREHEN METABOLIC PANEL: CPT | Performed by: INTERNAL MEDICINE

## 2024-12-09 PROCEDURE — 99999 PR PBB SHADOW E&M-EST. PATIENT-LVL II: CPT | Mod: PBBFAC,,,

## 2024-12-09 PROCEDURE — 86901 BLOOD TYPING SEROLOGIC RH(D): CPT | Performed by: INTERNAL MEDICINE

## 2024-12-09 PROCEDURE — A4216 STERILE WATER/SALINE, 10 ML: HCPCS | Performed by: INTERNAL MEDICINE

## 2024-12-09 PROCEDURE — 80197 ASSAY OF TACROLIMUS: CPT | Performed by: INTERNAL MEDICINE

## 2024-12-09 PROCEDURE — 85025 COMPLETE CBC W/AUTO DIFF WBC: CPT | Performed by: INTERNAL MEDICINE

## 2024-12-09 PROCEDURE — 36592 COLLECT BLOOD FROM PICC: CPT

## 2024-12-09 PROCEDURE — 84100 ASSAY OF PHOSPHORUS: CPT | Performed by: INTERNAL MEDICINE

## 2024-12-09 PROCEDURE — 25000003 PHARM REV CODE 250: Performed by: INTERNAL MEDICINE

## 2024-12-09 PROCEDURE — 63600175 PHARM REV CODE 636 W HCPCS: Performed by: INTERNAL MEDICINE

## 2024-12-09 RX ORDER — SODIUM CHLORIDE 0.9 % (FLUSH) 0.9 %
10 SYRINGE (ML) INJECTION
Status: CANCELLED | OUTPATIENT
Start: 2024-12-09

## 2024-12-09 RX ORDER — TACROLIMUS 0.5 MG/1
CAPSULE ORAL
Qty: 90 CAPSULE | Refills: 11 | Status: SHIPPED | OUTPATIENT
Start: 2024-12-09 | End: 2025-12-09

## 2024-12-09 RX ORDER — SODIUM CHLORIDE 0.9 % (FLUSH) 0.9 %
10 SYRINGE (ML) INJECTION
Status: DISCONTINUED | OUTPATIENT
Start: 2024-12-09 | End: 2024-12-09 | Stop reason: HOSPADM

## 2024-12-09 RX ORDER — HEPARIN 100 UNIT/ML
500 SYRINGE INTRAVENOUS
Status: DISCONTINUED | OUTPATIENT
Start: 2024-12-09 | End: 2024-12-09 | Stop reason: HOSPADM

## 2024-12-09 RX ORDER — HEPARIN 100 UNIT/ML
500 SYRINGE INTRAVENOUS
Status: CANCELLED | OUTPATIENT
Start: 2024-12-09

## 2024-12-09 RX ADMIN — Medication 10 ML: at 08:12

## 2024-12-09 RX ADMIN — HEPARIN 500 UNITS: 100 SYRINGE at 08:12

## 2024-12-09 NOTE — PROGRESS NOTES
BMT Pharmacist Immunosuppression Note:    Reviewed patient's tacrolimus level with Dr. Hickey and it is above goal range. Instructed patient to decrease your current regimen of 2 capsules (1mg) in the morning and 2 capsules (1mg) in the evening to 2 capsules (1mg) in the morning and 1 capsules (0.5mg) in the evening.       Halima Thomas, PharmD  Clinical Pharmacist-BMT/Hematology  [facility]

## 2024-12-09 NOTE — PROGRESS NOTES
Section of Hematology and Stem Cell Transplantation    Post-Transplantation Follow Up Visit     12/09/2024    Transplant History:   Primary oncologist: Paco Hickey MD  Primary oncologic diagnosis: MDS  Transplant date: 9/19/2024  Donor: haploidentical  Blood Type (Patient): B +  Blood Type (Donor): A +  CMV (Patient): Positive  CMV (Donor): Positive  Graft source: Bone marrow  CD34+ cell dose: 3.55x10^6  Conditioning Regimen: Fludarabine plus melphalan 100 mg/m2 + 2Gy TBI  GVHD prophylaxis: Post-transplant cyclophosphamide, Tacrolimus, MMF  Immediate post-transplant complications: Patient experienced expected GI toxicities with C diff negative diarrhea and nausea, neutropenic fever with negative infectious work up, expected cytopenias requiring transfusions, electrolyte abnormalities requiring replacement, volume overload requiring diuresis, intermittent SOB from pulmonary edema requiring 02, and a hemorrhoid flare up. Diarrhea and SOB improved towards the end of the hospital stay.     History of Present Ilness:   Guillaume Salinas (Guillaume) is a pleasant 69 y.o.male with a past medical history of MDS who is status post haploidentical stem cell transplantation conditioned with FluMel 100 + PTCy+ 2Gy TBI who is currently day +81  who presents for post-transplant follow up. Offered  through language services, declined - daughter assisted.     Interval History:     Patient presents for routine follow-up. He reports doing well with improved appetite with him eating three full meals daily with snacks without no nausea/emesis. No bleeding/bruising. No rash. No pruritis. No diarrhea.  His restless leg syndrome and insomnia are improving. He is taking his folate supplement  and copper supplement. Prednisone taper completed. Has some fatigue during the day but it is manageable.     PAST MEDICAL HISTORY:   Past Medical History:   Diagnosis Date    Anticoagulant long-term use     Coronary artery disease      Hypertension     Myelodysplastic syndrome     Peripheral vascular disease, unspecified        PAST SURGICAL HISTORY:   Past Surgical History:   Procedure Laterality Date    BONE MARROW BIOPSY Left 2023    Procedure: Biopsy-bone marrow;  Surgeon: Harry Diamond MD;  Location: [facility] OR;  Service: Oncology;  Laterality: Left;    COLONOSCOPY N/A 2022    Procedure: COLONOSCOPY Golytely Vaccinated will bring cards;  Surgeon: Dereje Simon MD;  Location: [facility] ENDO;  Service: Endoscopy;  Laterality: N/A;  Do not cancel this order    INSERTION OF LEMONS CATHETER Right 2024    Procedure: INSERTION, CATHETER, CENTRAL VENOUS, LEMONS TRIPLE LUMEN;  Surgeon: Kg Patten MD;  Location: [address];  Service: General;  Laterality: Right;     PAST SOCIAL HISTORY:  Social History     Socioeconomic History    Marital status:    Tobacco Use    Smoking status: Former     Current packs/day: 0.00     Average packs/day: 0.3 packs/day for 50.0 years (12.5 ttl pk-yrs)     Types: Cigarettes     Start date: 3/1/1973     Quit date: 3/1/2023     Years since quittin.7     Passive exposure: Past    Smokeless tobacco: Never   Substance and Sexual Activity    Alcohol use: Not Currently    Drug use: Never    Sexual activity: Not Currently     Partners: Female     Social Drivers of Health     Financial Resource Strain: Low Risk  (2024)    Overall Financial Resource Strain (CARDIA)     Difficulty of Paying Living Expenses: Not hard at all   Food Insecurity: No Food Insecurity (2024)    Hunger Vital Sign     Worried About Running Out of Food in the Last Year: Never true     Ran Out of Food in the Last Year: Never true   Transportation Needs: No Transportation Needs (2024)    TRANSPORTATION NEEDS     Transportation : No   Physical Activity: Insufficiently Active (2024)    Exercise Vital Sign     Days of Exercise per Week: 2 days     Minutes of Exercise per Session: 60 min    Stress: Stress Concern Present (9/30/2024)    [employer] - Occupational Stress Questionnaire     Feeling of Stress : To some extent   Housing Stability: Low Risk  (9/30/2024)    Housing Stability Vital Sign     Unable to Pay for Housing in the Last Year: No     Homeless in the Last Year: No       FAMILY HISTORY:  Cancer-related family history includes Cancer in his brother and father.    CURRENT MEDICATIONS:   Medication List with Changes/Refills   Current Medications    ACYCLOVIR (ZOVIRAX) 800 MG TAB    Take 1 tablet (800 mg total) by mouth 2 (two) times daily.    BUDESONIDE (ENTOCORT EC) 3 MG CAPSULE    Take 1 capsule (3 mg total) by mouth 3 (three) times daily.    CARVEDILOL (COREG) 6.25 MG TABLET    Take 1 tablet (6.25 mg total) by mouth 2 (two) times daily.    COPPER GLUCONATE 2 MG CAP    Take 2 mg by mouth once daily.    FOLIC ACID (FOLVITE) 1 MG TABLET    Take 1 tablet (1 mg total) by mouth once daily.    GABAPENTIN (NEURONTIN) 300 MG CAPSULE    Take 2 capsules (600 mg total) by mouth every evening.    LETERMOVIR (PREVYMIS) 480 MG TAB    Take 1 tablet (480 mg total) by mouth Daily.    LIDOCAINE (LIDODERM) 5 %    Place 1 patch onto the skin once daily. Remove & Discard patch within 12 hours or as directed by MD. Place patch to left shoulder.    LOPERAMIDE (IMODIUM) 2 MG CAPSULE    Take 1 capsule (2 mg total) by mouth 2 (two) times daily as needed for Diarrhea.    MAGNESIUM OXIDE (MAG-OX) 400 MG (241.3 MG MAGNESIUM) TABLET    Take 2 tablets (800 mg total) by mouth 2 (two) times daily.    ONDANSETRON (ZOFRAN-ODT) 8 MG TBDL    Take 1 tablet (8 mg total) by mouth every 8 (eight) hours as needed (nausea/vomiting).    PANTOPRAZOLE (PROTONIX) 40 MG TABLET    Take 1 tablet (40 mg total) by mouth once daily.    POSACONAZOLE (NOXAFIL) 100 MG TBEC TABLET    Take 3 tablets (300 mg total) by mouth once daily.    PREDNISONE (DELTASONE) 10 MG TABLET    Take 3 tablets (30 mg total) by mouth  once daily.    PROCHLORPERAZINE (COMPAZINE) 5 MG TABLET    Take 1 tablet (5 mg total) by mouth 4 (four) times daily as needed for Nausea.    ROPINIROLE (REQUIP) 0.5 MG TABLET    Take 1 tablet (0.5 mg total) by mouth every evening.    SULFAMETHOXAZOLE-TRIMETHOPRIM 800-160MG (BACTRIM DS) 800-160 MG TAB    Take 1 tablet by mouth every Mon, Wed, Fri. START 10/21/24    TACROLIMUS (PROGRAF) 0.5 MG CAP    Take 2 capsules (1 mg total) by mouth every morning AND 2 capsules (1 mg total) every evening.    TRAMADOL (ULTRAM) 50 MG TABLET    Take 1 tablet (50 mg total) by mouth every 6 (six) hours as needed for Pain.    ZOLPIDEM (AMBIEN) 5 MG TAB    Take 1 tablet (5 mg total) by mouth nightly as needed (insomnia).       ALLERGIES:   Review of patient's allergies indicates:  No Known Allergies    GVHD Review of Systems:     Pertinent positives and negatives included in the HPI. Otherwise a 14 point review of systems is negative. GVHD review of systems recorded in BMT flowsheet.     Physical Exam:     Vitals:    12/09/24 0818   BP: 134/74   Pulse: 82   Temp: 97.9 °F (36.6 °C)       General: Appears well, NAD.   HEENT: MMM, no OP lesions  Pulmonary: CTAB, no increased work of breathing, no W/R/C  Cardiovascular: S1S2 normal, RRR, no M/R/G  Abdominal: Soft, NT, ND, BS+, no HSM  Extremities: No C/C/E  Neurological: AAOx4, grossly normal, no focal deficits  Dermatologic: No appreciable rashes or lesions    ECOG Performance Status: (foot note - ECOG PS provided by Eastern Cooperative Oncology Group) 1 - Symptomatic but completely ambulatory    Karnofsky Performance Score:  80%- Normal Activity with Effort: Some Symptoms of Disease    Labs:   Lab Results   Component Value Date    WBC 2.06 (L) 12/09/2024    HGB 10.9 (L) 12/09/2024    HCT 33.3 (L) 12/09/2024     (H) 12/09/2024    PLT 20 (LL) 12/09/2024        CMP  Sodium   Date Value Ref Range Status   12/09/2024 143 136 - 145 mmol/L Final     Potassium   Date Value Ref Range Status    12/09/2024 4.3 3.5 - 5.1 mmol/L Final     Chloride   Date Value Ref Range Status   12/09/2024 108 95 - 110 mmol/L Final     CO2   Date Value Ref Range Status   12/09/2024 26 23 - 29 mmol/L Final     Glucose   Date Value Ref Range Status   12/09/2024 127 (H) 70 - 110 mg/dL Final     BUN   Date Value Ref Range Status   12/09/2024 20 8 - 23 mg/dL Final     Creatinine   Date Value Ref Range Status   12/09/2024 0.7 0.5 - 1.4 mg/dL Final     Calcium   Date Value Ref Range Status   12/09/2024 9.0 8.7 - 10.5 mg/dL Final     Total Protein   Date Value Ref Range Status   12/09/2024 5.2 (L) 6.0 - 8.4 g/dL Final     Albumin   Date Value Ref Range Status   12/09/2024 3.2 (L) 3.5 - 5.2 g/dL Final     Total Bilirubin   Date Value Ref Range Status   12/09/2024 0.4 0.1 - 1.0 mg/dL Final     Comment:     For infants and newborns, interpretation of results should be based  on gestational age, weight and in agreement with clinical  observations.    Premature Infant recommended reference ranges:  Up to 24 hours.............<8.0 mg/dL  Up to 48 hours............<12.0 mg/dL  3-5 days..................<15.0 mg/dL  6-29 days.................<15.0 mg/dL       Alkaline Phosphatase   Date Value Ref Range Status   12/09/2024 92 40 - 150 U/L Final     AST   Date Value Ref Range Status   12/09/2024 25 10 - 40 U/L Final     ALT   Date Value Ref Range Status   12/09/2024 43 10 - 44 U/L Final     Anion Gap   Date Value Ref Range Status   12/09/2024 9 8 - 16 mmol/L Final     eGFR   Date Value Ref Range Status   12/09/2024 >60.0 >60 mL/min/1.73 m^2 Final          Imaging:   Hospital imaging reviewed.    Pathology:  Prior pathology reviewed. Plan for day +30 bone marrow biopsy on 10/23/24    Acute GVHD Scoring:  GVHD Acute Assessment      No GVHD at this time. Unable to record via flowsheets.               Assessment and Plan:   Guillaume AmayaBetsy Brad (Guillaume) is a pleasant 69 y.o.male with a past medical history of MDS s/p haplo SCT who presents  for post-transplant follow up.    MDS: Status post treatment with azacitidine 75 mg/m2 daily x7 days plus venetoclax 100mg (voriconazole) daily x14 of 28 days.Venetoclax decreased to 7 days with cycle 3 until completion of 11 cycles. No plans for maintenance at this time.     Status post allogeneic stem cell transplantation: As noted above, status post haploidentical stem cell transplantation conditioned with FluMel 100 + PTCy+ 2Gy TBI . Currently Day+ 81. Engrafted on 10/09/24 day +20.  Day 30 bone marrow (11/5/2024) showing mildly hypercellular marrow with no increased blast (0.3%) and no evidence of myeloid neoplasm. Pending chimerisms, NGS, and CG  Graft versus host disease: GVHD prophylaxis with Post-transplant cyclophosphamide, Tacrolimus, MMF (MMF d/c on D+35). Persistent nausea despite anti-emetics, reducing pill burden. 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 He started budesonide 3mg TID on 10/28/24. Due to persistent nausea despite budesonide so started systemic steroids 11/1 at 0.5 mg/kg and his steroid taper has been given to him. Steroid taper completed 12/8/24.  Current tacro dose: 1mg mg BID  Last tacro level: In process   Adjustments: Pending level today   Scheduling Day 100 bone marrow biopsy     Immunosuppression: Prevention with posaconazole, acyclovir. CMV prophylaxis with letermovir. PJP prophyalxis Bactrim MWF. Continue weekly monitoring of CMV and EBV.  Last CMV: Not-detected, last EBV: Not-detected.   Active infections: N/A    Pancytopenia: Due to underlying disease and chemotherapy. Transfuseu for Hgb <7 g/dL and platelets <10k. Folate and copper deficient, on supplementation. Will continue to monitor to see if platelets begin to rise with his supplements. Promacta started on 12/9/24.    Folic Acid deficiency   Folic acid borderline low at 4.3, taking folic acid 1mg daily     Copper deficiency   Copper level of 635, he is taking a daily OTC Copper supplement.      Hypomagnesemia: Related to tacro, poor po intake. 1.5 today.   Continue MagOx to 800mg twice daily.      Chemotherapy Induced Nausea: Resolved with steroids.     BK Virus: Patient does not have anymore pain or hesitancy with urination. He denies any blood. He is still experiencing some frequency. Will continue hydration efforts.      Anxiety, Restless Leg Syndrome: Noted since discharge by family. He started ropinirole 0.5mg and has experienced significant improvement.    Insomnia: Patient with significant improvement. Continue Ambien and Ropinirole for RLS.     Follow up: Twice weekly follow up with labs.    Orders Placed:           Medical Complexity:   Visit today included increased complexity associated with the care of the episodic problems addressed above and managing the longitudinal care of the patient due to the serious and/or complex managed problem(s) history of allo SCT.      Follow Up:      Twice weekly follow up as scheduled.       A total of 30 minutes was spent in pre-visit chart review, personal interpretation of labs and imaging, and medication review. Total visit time 40 minutes, >50 % counseling.     Lawson Currie MD  Hematology/Oncology Fellow PGY-V

## 2024-12-09 NOTE — PROGRESS NOTES
BMT Pharmacist Medication Review Note     All current medications were reviewed with the patient and his caregiver. The patient brought in all of his medications with him to this visit.      We discussed the changes made since last meeting:   - Prednisone taper is complete.   - Tacrolimus was increased to 1 mg BID since last admission.       The patient has ample supply of all medications except for Posaconazole and Budesonide. Called [facility] pharmacy on behalf of patient to request refill.       Medicamentos/Medications Indicación/Indication Mañana/Morning Tarde/Afternoon Noche/Night   Acyclovir 800mg  Prevención de infecciones virales  Viral infection prevention 1 tableta  1 tableta   Letermovir (Prevymis®) 480mg Prevención de infección por CMV  CMV infection prevention 1 tableta     Posaconazole 100mg Prevencón de infecciones fungales  Fungal infection prevention 3 tabletas     Sulfamethoxazole-trimethoprim (Bactrim®) 800-160mg Fungal pneumonia prevention 1 tableta los lunes, miércoles y viernes (Mon., Wed., Fri)      **Tacrolimus 0.5mg Prevención de GVHD - NO tome la dosis de por la mañana en días que se relizará laboratorios  2 cápsula  2 cápsulas   Budesonide 3 mg  GI GVHD 1 cápsula 1 cápsula 1 cápsula   Magnesium oxide 400mg Suplemento de magnesio 2 tabletas  2 tabletas      Ropinirole (Requip) 0.5 mg pierna inquieta   1 tableta   Pantoprazole (Protonix) 40 mg reflujo ácido   1 tableta     Carvedilol (Coreg®) 6.25 mg Presión arterial maeve  1 tableta  1 tableta   Gabapentin (Neurontin®) 300 mg Neuropatía   2 cápsulas   Copper Gluconate 2 mg Suplemento 1 cápsula     Folic Acid 1 mg Suplemento 1 tableta     **medicamento nuevo o cambios realizados al medicamento  MEDICAMENTOS CUANDO VI NECESARIOS/AS NEEDED MEDICATIONS:                                                                    Loperamida (Imodium®) 2 mg dos veces al día según sea necesario para la diarrea  Ondansetron 8mg hasta sosa veces al día  según sea necesario para Náuseas/Vómito  Prochlorperazine (Compazine®) 5mg cada 6 horas as needed for Náuseas/Vómito                                                           Tramadol (Ultram®) 50 mg cada 6 horas cuando sea necesario para el dolor         Lidocaine Patch (Lidoderm) 5%: 1 parcho en el hombro milagros y  1 parcho en el hombro derecho. Remueva luego de las 12 horas. Aplicar diariamente según sea necesario para el dolor de hombro.  Zolpidem (Ambien) 5 mg tableta todas las noches según sea necesario para dormir     ya no stephany:                                                              Hold until told to restart: Cilostazol  Prednisone        All questions were answered.      Halima Thomas, PharmD  Clinical Pharmacist-BMT/Hematology  [facility]

## 2024-12-10 NOTE — PROGRESS NOTES
Route Chart for Scheduling    BMT Chart Routing      Follow up with physician . Weekly follow-up after 12/30 with labs prior.   Follow up with [facility]    Provider visit type    Infusion scheduling note    Injection scheduling note    Labs CBC, CMP, CMV, EBV, type and screen, tacrolimus level, phosphorus and magnesium   Scheduling:  Preferred lab:  Lab interval: once a week     Imaging    Pharmacy appointment    Other referrals                    Supportive Plan Information  OP FILGRASTIM 300 MCG Mohit Hickey MD   Associated Diagnosis: Myelodysplastic syndrome   noted on 5/12/2023  Associated Diagnosis: Chemotherapy-induced neutropenia   noted on 4/5/2024  Associated Diagnosis: S/P allogeneic bone marrow transplant   noted on 8/27/2024   Line of treatment: Supportive Care   Treatment goal: Supportive     Upcoming Treatment Dates - OP FILGRASTIM 300 MCG    10/27/2024       Medications       filgrastim-sndz (ZARXIO) injection 300 mcg/0.5 mL (Preferred Regimen)  10/28/2024       Medications       filgrastim-sndz (ZARXIO) injection 300 mcg/0.5 mL (Preferred Regimen)  10/29/2024       Medications       filgrastim-sndz (ZARXIO) injection 300 mcg/0.5 mL (Preferred Regimen)  10/30/2024       Medications       filgrastim-sndz (ZARXIO) injection 300 mcg/0.5 mL (Preferred Regimen)    Therapy Plan Information  VITAMIN B12 (CYANOCOBALAMIN) IM for B12 deficiency, noted on 2/2/2024  cyanocobalamin injection 1,000 mcg  1,000 mcg, Intramuscular, Every visit    PORT FLUSH for Myelodysplastic syndrome, noted on 5/12/2023  Flushes  heparin, porcine (PF) 100 unit/mL injection flush 500 Units  500 Units, Intravenous, Every visit  sodium chloride 0.9% flush 10 mL  10 mL, Intravenous, Every visit      No therapy plan of the specified type found.

## 2024-12-11 ENCOUNTER — OFFICE VISIT (OUTPATIENT)
Dept: PALLIATIVE MEDICINE | Facility: CLINIC | Age: 69
End: 2024-12-11
Payer: MEDICARE

## 2024-12-11 VITALS
HEART RATE: 70 BPM | DIASTOLIC BLOOD PRESSURE: 73 MMHG | SYSTOLIC BLOOD PRESSURE: 152 MMHG | OXYGEN SATURATION: 99 % | WEIGHT: 143.75 LBS | BODY MASS INDEX: 21.23 KG/M2

## 2024-12-11 DIAGNOSIS — M79.604 PAIN IN BOTH LOWER EXTREMITIES: ICD-10-CM

## 2024-12-11 DIAGNOSIS — Z51.5 ENCOUNTER FOR PALLIATIVE CARE: ICD-10-CM

## 2024-12-11 DIAGNOSIS — Z71.89 ADVANCED CARE PLANNING/COUNSELING DISCUSSION: ICD-10-CM

## 2024-12-11 DIAGNOSIS — M79.605 PAIN IN BOTH LOWER EXTREMITIES: ICD-10-CM

## 2024-12-11 DIAGNOSIS — G47.00 INSOMNIA, UNSPECIFIED TYPE: ICD-10-CM

## 2024-12-11 DIAGNOSIS — Z91.89 ADJUSTMENT TO LIFE THREATENING ILLNESS: ICD-10-CM

## 2024-12-11 DIAGNOSIS — D46.9 MYELODYSPLASTIC SYNDROME: Primary | ICD-10-CM

## 2024-12-11 DIAGNOSIS — R53.0 NEOPLASTIC (MALIGNANT) RELATED FATIGUE: ICD-10-CM

## 2024-12-11 PROCEDURE — 99999 PR PBB SHADOW E&M-EST. PATIENT-LVL III: CPT | Mod: PBBFAC,,, | Performed by: STUDENT IN AN ORGANIZED HEALTH CARE EDUCATION/TRAINING PROGRAM

## 2024-12-11 NOTE — PROGRESS NOTES
Palliative Medicine Clinic Note        Consult Requested By: Dr. Mohit Hickey      Reason for Consult/Chief Complaint: Symptom management and ACP in the setting of MDS    Patient was offered a  by this provider for the visit; he declined and requested that his daughter serve as his      ASSESSMENT/PLAN:      Plan/Recommendations:    Guillaume was seen today for follow up.    Diagnoses and all orders for this visit:    Myelodysplastic syndrome/Stem cell transplant candidate  - followed by Dr. Hickey  - s/p haploSCT conditioned with +2Gy TBI  - currently on promacta  - plans for bone marrow biopsy after day 100     Encounter for palliative care/Advance Care Planning   - patient decisional and accompanied by his daughter today  - no ACP documents uploaded into EMR  - philosophy of palliative medicine and new patient folder in English provided at first visit, though requested that [facility] staff send new patient folder information in English to patient after completion of visit. [facility] RN acknowledged request and reports she will send the English information to patient/family  - new patient folder in English and ACP booklet in English and English provided to patient/family today.   - patient to review with family and will complete documents at next visit   - goals: life prolonging  - code status not discussed     Pain in both lower extremities/Abdominal pain  - stable/controlled   - at last visit he notes that his legs hurt with walking; he has been diagnosed with PAD previously. He has been unable to complete treatment for PAD secondary to treatment of MDS. Pain improves with rest  - patient with weakness in left arm and legs; patient completed HH PT and now awaiting start of outpatient PT  - injection site pain resolved  - does not need opioid medication at this time  - opioid safety sheet in new patient folder for patient/family to review  - will prescribe narcan in future if opioids  are needed    Neoplastic (malignant) related fatigue/Insomnia  - patient with improved fatigue and insomnia; still with ongoing insomnia though notes he feels more rested in AM when he wakes up  - at first visit: he notes that insomnia is his worst problem. He has been having worsening anxiety for past two to three years  - patient still wakes up multiple times at night   - patient now taking ambien 5 mg qhs and ropinirole 0.5 mg qhs  - discussed sleep hygiene and tip sheet for better sleep in new patient folder for patient/family to review    Adjustment to life threatening illness  - patient denies any anxiety/depression on ESAS and feels that his mood is good  - he reports that he is not consciously aware of any worries but he is not sure if subconsciously his worries are affecting him  - emotional support provided today  - hold on pharmacologic intervention at this time    Advance Care Planning   Advance Directives:   Living Will: No    [place]: No    Do Not Resuscitate Status: No    Medical Power of : No        Oral Declaration: Yes   Witnesses:  Niesha Patrick LCSW   Agent's Name:  Yennifer Thomas   Agent's Contact Number:  [phone]    Decision Making:  Patient answered questions and Family answered questions  Goals of Care: Advance Care Planning    Date: 12/11/2024    [facility]  I engaged the patient and family in a voluntary conversation about advance care planning and we specifically addressed what the goals of care would be moving forward, in light of the patient's change in clinical status, specifically MDS.  We did specifically address the patient's likely prognosis, which is good .  We explored the patient's values and preferences for future care.  The patient and family endorses that what is most important right now is to focus on remaining as independent as possible    Accordingly, we have decided that the best plan to meet the patient's goals includes continuing with treatment    A total of 16  min was spent on advance care planning, goals of care discussion, emotional support, formulating and communicating prognosis and exploring burden/benefit of various approaches of treatment. This discussion occurred on a fully voluntary basis with the verbal consent of the patient and/or family.                Follow up: 8 weeks     Plan discussed with: oncologist       SUBJECTIVE:      History of Present Illness / Interval History:  Guillaume Salinas is 69 y.o. male with emphysema, right iliac artery stenosis, CAD, HTN, HLD, CKD stage III, and MDS presents to Palliative Care Clinic for physical symptoms, advance care planning,, clarification of goals of care, and additional support.. Please see oncology notes for more details on oncologic history and treatment course.       12/11/24  History of Present Illness    CHIEF COMPLAINT:  - Patient presents for a follow-up visit post-transplant to discuss ongoing recovery and symptoms.    HISTORY OBTAINED FROM:  - Patient, Daughter    HPI:  Patient reports overall improvement since his last pre-transplant visit. He experienced significant pain and multiple side effects immediately post-transplant, but most have now resolved. His primary ongoing concern is weakness, particularly in his legs and left arm. He finds it challenging to lift objects with his left arm, though he does not drop things. This arm weakness has remained consistent since the transplant. Patient received physical therapy at home until last week and has an upcoming appointment on the 20th. He reports significant weight and muscle loss post-transplant.    Patient experienced a near-fall incident last week while attempting to prevent his grandson from falling, resulting in his knees touching the ground. He no longer uses a walker, which he was using when he had two falls shortly after the transplant. His appetite has improved significantly after being unable to eat for about a month and a half  post-transplant. He now eats regular meals but feels satisfied quickly.    Patient has been experiencing insomnia, which has improved with the use of Ambien. He still wakes up 2-3 times per night for 5-30 minutes, but generally feels rested in the morning. He no longer takes daytime naps. Patient also reports restless legs, occurring almost daily, which have improved with new medication. He occasionally experiences generalized restlessness, primarily in his legs.    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 His current physical activity consists mainly of walking, with his daily step count reduced from 10,000-15,000 steps pre-transplant to about 3,000-4,000 steps currently.    Patient denies any recent falls, current use of mobility aids, persistent nausea, or vomiting. He has not been diagnosed with any new conditions since the transplant.    GOALS OF CARE/ADVANCED DIRECTIVES:  - Lives at home with his wife  - Wife appears to be involved in his care and decision-making, though no formal power of  has been established    ACTIVITIES OF DAILY LIVING:  - Able to walk, currently doing about 3,000-4,000 steps a day, down from 10,000-15,000 steps before transplant  - No recent falls, but had a near-fall incident while trying to prevent his grandson from falling  - Able to drive  - Experiences weakness, mainly in legs and left arm, making it challenging to lift things  - Appetite has improved, now eating normal portions similar to before transplant  - Sleep is improved with medication (Sulpiride/Ambien), but still wakes up 2-3 times per night for 5-30 minutes  - No daytime naps  - Experiences restless legs syndrome, improved with medication    SOCIAL HISTORY:  - Marital Status:   - Occupation:  - 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 Negative for appetite change.   HENT: Negative.     Eyes: Negative.    Respiratory: Negative.     Cardiovascular: Negative.    Gastrointestinal:  Positive for abdominal pain. Negative for constipation, diarrhea and nausea.   Genitourinary: Negative.    Musculoskeletal:  Positive for leg pain.   Neurological:  Positive for weakness (left side; improving). Negative for syncope.   Psychiatric/Behavioral:  Positive for sleep disturbance. Negative for dysphoric mood. The patient is not nervous/anxious.    All other systems reviewed and are negative.      Review of Symptoms      Symptom Assessment (ESAS 0-10 Scale)  Pain:  0  Dyspnea:  0  Anxiety:  0  Nausea:  0  Depression:  0  Anorexia:  0  Fatigue:  0  Insomnia:  0  Restlessness:  0  Agitation:  0     CAM / Delirium:  Negative  Constipation:  Negative  Diarrhea:  Negative    Anxiety:  Is not nervous/anxious  Constipation:  No constipation    Bowel Management Plan (BMP):  No      Pain Assessment:  OME in 24 hours:  0  Location(s): none      Modified Yisel Scale:  0    ECOG Performance Status ndGndrndanddndend:nd nd2nd Living Arrangements:  Lives with spouse    Psychosocial/Cultural:   See Palliative Psychosocial Note: Yes  Please see SW note from 08/12/2024 for full details.    Patient enjoys painting/drawing and gardening. He has three children with his wife  **Primary  to Follow**  Palliative Care  Consult: No    Spiritual:  F - [place] and Belief:  Advent  I - Importance:  Moderate  C - Community:  None  A - Address in Care:  Needs met        Medications:    Current Outpatient Medications:     acyclovir (ZOVIRAX) 800 MG Tab, Take 1 tablet (800 mg total) by mouth 2 (two) times daily., Disp:  60 tablet, Rfl: 11    budesonide (ENTOCORT EC) 3 mg capsule, Take 1 capsule (3 mg total) by mouth 3 (three) times daily., Disp: 60 capsule, Rfl: 2    carvediloL (COREG) 6.25 MG tablet, Take 1 tablet (6.25 mg total) by mouth 2 (two) times daily., Disp: 180 tablet, Rfl: 3    copper gluconate 2 mg Cap, Take 2 mg by mouth once daily., Disp: 90 capsule, Rfl: 0    eltrombopag olamine (PROMACTA) 50 MG Tab, Take 1 tablet (50 mg total) by mouth once daily., Disp: 30 tablet, Rfl: 11    folic acid (FOLVITE) 1 MG tablet, Take 1 tablet (1 mg total) by mouth once daily., Disp: 100 tablet, Rfl: 3    gabapentin (NEURONTIN) 300 MG capsule, Take 2 capsules (600 mg total) by mouth every evening., Disp: , Rfl:     letermovir (PREVYMIS) 480 mg Tab, Take 1 tablet (480 mg total) by mouth Daily., Disp: 28 tablet, Rfl: 9    LIDOcaine (LIDODERM) 5 %, Place 1 patch onto the skin once daily. Remove & Discard patch within 12 hours or as directed by MD. Place patch to left shoulder. (Patient taking differently: Place 1 patch onto the skin daily as needed (Shoulder Pain). Remove & Discard patch within 12 hours or as directed by MD. Place patch to left shoulder.), Disp: 30 patch, Rfl: 0    loperamide (IMODIUM) 2 mg capsule, Take 1 capsule (2 mg total) by mouth 2 (two) times daily as needed for Diarrhea., Disp: , Rfl:     magnesium oxide (MAG-OX) 400 mg (241.3 mg magnesium) tablet, Take 2 tablets (800 mg total) by mouth 2 (two) times daily., Disp: 120 tablet, Rfl: 11    ondansetron (ZOFRAN-ODT) 8 MG TbDL, Take 1 tablet (8 mg total) by mouth every 8 (eight) hours as needed (nausea/vomiting)., Disp: 90 tablet, Rfl: 11    pantoprazole (PROTONIX) 40 MG tablet, Take 1 tablet (40 mg total) by mouth once daily., Disp: 30 tablet, Rfl: 3    posaconazole (NOXAFIL) 100 mg TbEC tablet, Take 3 tablets (300 mg total) by mouth once daily., Disp: 90 tablet, Rfl: 11    prochlorperazine (COMPAZINE) 5 MG tablet, Take 1 tablet (5 mg total) by mouth 4 (four) times  daily as needed for Nausea., Disp: , Rfl:     rOPINIRole (REQUIP) 0.5 MG tablet, Take 1 tablet (0.5 mg total) by mouth every evening., Disp: 30 tablet, Rfl: 11    sulfamethoxazole-trimethoprim 800-160mg (BACTRIM DS) 800-160 mg Tab, Take 1 tablet by mouth every Mon, Wed, Fri. START 10/21/24, Disp: 12 tablet, Rfl: 11    tacrolimus (PROGRAF) 0.5 MG Cap, Take 2 capsules (1 mg total) by mouth every morning AND 1 capsule (0.5 mg total) every evening., Disp: 90 capsule, Rfl: 11    traMADoL (ULTRAM) 50 mg tablet, Take 1 tablet (50 mg total) by mouth every 6 (six) hours as needed for Pain., Disp: 20 tablet, Rfl: 0    zolpidem (AMBIEN) 5 MG Tab, Take 1 tablet (5 mg total) by mouth nightly as needed (insomnia)., Disp: 30 tablet, Rfl: 0      External  database queried on 12/11/2024  by Elvira BARTLETT :  11/25/2024 Zolpidem tartrate 5 mg Disp: 30 for 30 days  11/08/2024 Zolpidem tartrate 5 mg Disp: 15 for 15 days       Review of patient's allergies indicates:  No Known Allergies        OBJECTIVE:         Physical Exam:  Vitals:    Vitals:    12/11/24 1007   BP: (!) 152/73   Pulse: 70     Physical Exam  Vitals reviewed.   Constitutional:       General: He is not in acute distress.     Appearance: He is not toxic-appearing or diaphoretic.   HENT:      Head: Normocephalic and atraumatic.      Right Ear: External ear normal.      Left Ear: External ear normal.   Eyes:      General: No scleral icterus.        Right eye: No discharge.         Left eye: No discharge.      Extraocular Movements: Extraocular movements intact.   Neck:      Comments: Trachea midline  Pulmonary:      Effort: Pulmonary effort is normal. No respiratory distress.   Abdominal:      General: Abdomen is flat. There is no distension.   Musculoskeletal:         General: No signs of injury.      Cervical back: Normal range of motion.      Right lower leg: No edema.      Left lower leg: No edema.   Skin:     General: Skin is dry.      Coloration: Skin  "is not jaundiced.      Findings: No rash.   Neurological:      General: No focal deficit present.      Mental Status: He is oriented to person, place, and time.      Cranial Nerves: No cranial nerve deficit.   Psychiatric:         Mood and Affect: Mood normal.         Behavior: Behavior normal.         Thought Content: Thought content normal.         Judgment: Judgment normal.           Labs: I have reviewed the latest labs on 12/09/2024 including CBC showing WBC: 2.06, Hg: 10.9, Hct: 33.3, platelets: 20 as well as CMP showing Cr: 0.7, Alb: 3.2      Imaging: I have reviewed the latest imaging on 10/14/2024 including CXR: "Elevated left hemidiaphragm and airspace consolidation at the left lung base and probably pleural effusion"    Additional 16 min time spent on a voluntary advance care planning and /or goals of care discussion, providing emotional support, formulating and communicating prognosis and exploring burden/benefit of various approaches of treatment.     This note was generated with the assistance of ambient listening technology. Verbal consent was obtained by the patient and accompanying visitor(s) for the recording of patient appointment to facilitate this note. I attest to having reviewed and edited the generated note for accuracy, though some syntax or spelling errors may persist. Please contact the author of this note for any clarification.    Elvira Maria MD        "

## 2024-12-12 ENCOUNTER — INFUSION (OUTPATIENT)
Dept: INFUSION THERAPY | Facility: HOSPITAL | Age: 69
End: 2024-12-12
Payer: MEDICARE

## 2024-12-12 ENCOUNTER — DOCUMENTATION ONLY (OUTPATIENT)
Dept: HEMATOLOGY/ONCOLOGY | Facility: CLINIC | Age: 69
End: 2024-12-12

## 2024-12-12 ENCOUNTER — TELEPHONE (OUTPATIENT)
Dept: HEMATOLOGY/ONCOLOGY | Facility: CLINIC | Age: 69
End: 2024-12-12

## 2024-12-12 ENCOUNTER — PATIENT MESSAGE (OUTPATIENT)
Dept: HEMATOLOGY/ONCOLOGY | Facility: CLINIC | Age: 69
End: 2024-12-12

## 2024-12-12 ENCOUNTER — OFFICE VISIT (OUTPATIENT)
Dept: HEMATOLOGY/ONCOLOGY | Facility: CLINIC | Age: 69
End: 2024-12-12
Payer: MEDICARE

## 2024-12-12 VITALS
BODY MASS INDEX: 21.21 KG/M2 | DIASTOLIC BLOOD PRESSURE: 67 MMHG | SYSTOLIC BLOOD PRESSURE: 137 MMHG | HEIGHT: 69 IN | HEART RATE: 69 BPM | WEIGHT: 143.19 LBS | TEMPERATURE: 98 F | OXYGEN SATURATION: 99 %

## 2024-12-12 DIAGNOSIS — D84.81 IMMUNODEFICIENCY DUE TO CONDITIONS CLASSIFIED ELSEWHERE: ICD-10-CM

## 2024-12-12 DIAGNOSIS — G47.00 INSOMNIA, UNSPECIFIED TYPE: ICD-10-CM

## 2024-12-12 DIAGNOSIS — R11.0 CHEMOTHERAPY-INDUCED NAUSEA: ICD-10-CM

## 2024-12-12 DIAGNOSIS — G25.81 RESTLESS LEG SYNDROME: ICD-10-CM

## 2024-12-12 DIAGNOSIS — E83.42 HYPOMAGNESEMIA: ICD-10-CM

## 2024-12-12 DIAGNOSIS — D61.818 PANCYTOPENIA: ICD-10-CM

## 2024-12-12 DIAGNOSIS — D89.813 GRAFT VS HOST DISEASE: ICD-10-CM

## 2024-12-12 DIAGNOSIS — E53.8 FOLIC ACID DEFICIENCY: ICD-10-CM

## 2024-12-12 DIAGNOSIS — T45.1X5A CHEMOTHERAPY-INDUCED NAUSEA: ICD-10-CM

## 2024-12-12 DIAGNOSIS — D46.9 MYELODYSPLASTIC SYNDROME: Primary | ICD-10-CM

## 2024-12-12 DIAGNOSIS — Z76.82 STEM CELL TRANSPLANT CANDIDATE: ICD-10-CM

## 2024-12-12 DIAGNOSIS — Z94.81 S/P ALLOGENEIC BONE MARROW TRANSPLANT: ICD-10-CM

## 2024-12-12 DIAGNOSIS — D89.810 ACUTE GVHD: ICD-10-CM

## 2024-12-12 DIAGNOSIS — E61.0 COPPER DEFICIENCY: ICD-10-CM

## 2024-12-12 LAB
ABO + RH BLD: NORMAL
ALBUMIN SERPL BCP-MCNC: 3.1 G/DL (ref 3.5–5.2)
ALP SERPL-CCNC: 85 U/L (ref 40–150)
ALT SERPL W/O P-5'-P-CCNC: 42 U/L (ref 10–44)
ANION GAP SERPL CALC-SCNC: 6 MMOL/L (ref 8–16)
ANISOCYTOSIS BLD QL SMEAR: SLIGHT
AST SERPL-CCNC: 19 U/L (ref 10–40)
BASO STIPL BLD QL SMEAR: ABNORMAL
BASOPHILS NFR BLD: 0 % (ref 0–1.9)
BILIRUB SERPL-MCNC: 0.4 MG/DL (ref 0.1–1)
BLD GP AB SCN CELLS X3 SERPL QL: NORMAL
BUN SERPL-MCNC: 20 MG/DL (ref 8–23)
CALCIUM SERPL-MCNC: 8.4 MG/DL (ref 8.7–10.5)
CHLORIDE SERPL-SCNC: 107 MMOL/L (ref 95–110)
CO2 SERPL-SCNC: 27 MMOL/L (ref 23–29)
CREAT SERPL-MCNC: 0.7 MG/DL (ref 0.5–1.4)
DACRYOCYTES BLD QL SMEAR: ABNORMAL
DIFFERENTIAL METHOD BLD: ABNORMAL
EOSINOPHIL NFR BLD: 0 % (ref 0–8)
ERYTHROCYTE [DISTWIDTH] IN BLOOD BY AUTOMATED COUNT: 20.2 % (ref 11.5–14.5)
EST. GFR  (NO RACE VARIABLE): >60 ML/MIN/1.73 M^2
GLUCOSE SERPL-MCNC: 129 MG/DL (ref 70–110)
HCT VFR BLD AUTO: 31.5 % (ref 40–54)
HGB BLD-MCNC: 10.6 G/DL (ref 14–18)
HYPOCHROMIA BLD QL SMEAR: ABNORMAL
IMM GRANULOCYTES # BLD AUTO: ABNORMAL K/UL (ref 0–0.04)
IMM GRANULOCYTES NFR BLD AUTO: ABNORMAL % (ref 0–0.5)
LYMPHOCYTES NFR BLD: 11 % (ref 18–48)
MAGNESIUM SERPL-MCNC: 1.7 MG/DL (ref 1.6–2.6)
MCH RBC QN AUTO: 37.7 PG (ref 27–31)
MCHC RBC AUTO-ENTMCNC: 33.7 G/DL (ref 32–36)
MCV RBC AUTO: 112 FL (ref 82–98)
MONOCYTES NFR BLD: 9 % (ref 4–15)
NEUTROPHILS NFR BLD: 80 % (ref 38–73)
NRBC BLD-RTO: 0 /100 WBC
PHOSPHATE SERPL-MCNC: 2.9 MG/DL (ref 2.7–4.5)
PLATELET # BLD AUTO: 15 K/UL (ref 150–450)
PLATELET BLD QL SMEAR: ABNORMAL
PMV BLD AUTO: ABNORMAL FL (ref 9.2–12.9)
POIKILOCYTOSIS BLD QL SMEAR: SLIGHT
POLYCHROMASIA BLD QL SMEAR: ABNORMAL
POTASSIUM SERPL-SCNC: 4 MMOL/L (ref 3.5–5.1)
PROT SERPL-MCNC: 4.9 G/DL (ref 6–8.4)
RBC # BLD AUTO: 2.81 M/UL (ref 4.6–6.2)
SODIUM SERPL-SCNC: 140 MMOL/L (ref 136–145)
SPECIMEN OUTDATE: NORMAL
TACROLIMUS BLD-MCNC: 8.6 NG/ML (ref 5–15)
WBC # BLD AUTO: 1.43 K/UL (ref 3.9–12.7)

## 2024-12-12 PROCEDURE — 80197 ASSAY OF TACROLIMUS: CPT | Performed by: INTERNAL MEDICINE

## 2024-12-12 PROCEDURE — 86850 RBC ANTIBODY SCREEN: CPT | Performed by: INTERNAL MEDICINE

## 2024-12-12 PROCEDURE — 80053 COMPREHEN METABOLIC PANEL: CPT | Performed by: INTERNAL MEDICINE

## 2024-12-12 PROCEDURE — 83735 ASSAY OF MAGNESIUM: CPT | Performed by: INTERNAL MEDICINE

## 2024-12-12 PROCEDURE — 99999 PR PBB SHADOW E&M-EST. PATIENT-LVL IV: CPT | Mod: PBBFAC,,,

## 2024-12-12 PROCEDURE — A4216 STERILE WATER/SALINE, 10 ML: HCPCS | Performed by: INTERNAL MEDICINE

## 2024-12-12 PROCEDURE — 36592 COLLECT BLOOD FROM PICC: CPT

## 2024-12-12 PROCEDURE — 25000003 PHARM REV CODE 250: Performed by: INTERNAL MEDICINE

## 2024-12-12 PROCEDURE — 85027 COMPLETE CBC AUTOMATED: CPT | Performed by: INTERNAL MEDICINE

## 2024-12-12 PROCEDURE — 87799 DETECT AGENT NOS DNA QUANT: CPT | Performed by: INTERNAL MEDICINE

## 2024-12-12 PROCEDURE — 85007 BL SMEAR W/DIFF WBC COUNT: CPT | Mod: NCS | Performed by: INTERNAL MEDICINE

## 2024-12-12 PROCEDURE — 63600175 PHARM REV CODE 636 W HCPCS: Performed by: INTERNAL MEDICINE

## 2024-12-12 PROCEDURE — 84100 ASSAY OF PHOSPHORUS: CPT | Performed by: INTERNAL MEDICINE

## 2024-12-12 RX ORDER — HEPARIN 100 UNIT/ML
500 SYRINGE INTRAVENOUS
OUTPATIENT
Start: 2024-12-12

## 2024-12-12 RX ORDER — HEPARIN 100 UNIT/ML
500 SYRINGE INTRAVENOUS
Status: DISCONTINUED | OUTPATIENT
Start: 2024-12-12 | End: 2024-12-12 | Stop reason: HOSPADM

## 2024-12-12 RX ORDER — SODIUM CHLORIDE 0.9 % (FLUSH) 0.9 %
10 SYRINGE (ML) INJECTION
OUTPATIENT
Start: 2024-12-12

## 2024-12-12 RX ORDER — SODIUM CHLORIDE 0.9 % (FLUSH) 0.9 %
10 SYRINGE (ML) INJECTION
Status: DISCONTINUED | OUTPATIENT
Start: 2024-12-12 | End: 2024-12-12 | Stop reason: HOSPADM

## 2024-12-12 RX ORDER — BUDESONIDE 3 MG/1
CAPSULE, COATED PELLETS ORAL
Start: 2024-12-12 | End: 2024-12-22

## 2024-12-12 RX ADMIN — HEPARIN SODIUM (PORCINE) LOCK FLUSH IV SOLN 100 UNIT/ML 500 UNITS: 100 SOLUTION at 08:12

## 2024-12-12 RX ADMIN — SODIUM CHLORIDE, PRESERVATIVE FREE 10 ML: 5 INJECTION INTRAVENOUS at 08:12

## 2024-12-12 NOTE — PROGRESS NOTES
Section of Hematology and Stem Cell Transplantation    Post-Transplantation Follow Up Visit     12/12/2024    Transplant History:   Primary oncologist: Paco Hickey MD  Primary oncologic diagnosis: MDS  Transplant date: 9/19/2024  Donor: haploidentical  Blood Type (Patient): B +  Blood Type (Donor): A +  CMV (Patient): Positive  CMV (Donor): Positive  Graft source: Bone marrow  CD34+ cell dose: 3.55x10^6  Conditioning Regimen: Fludarabine plus melphalan 100 mg/m2 + 2Gy TBI  GVHD prophylaxis: Post-transplant cyclophosphamide, Tacrolimus, MMF  Immediate post-transplant complications: Patient experienced expected GI toxicities with C diff negative diarrhea and nausea, neutropenic fever with negative infectious work up, expected cytopenias requiring transfusions, electrolyte abnormalities requiring replacement, volume overload requiring diuresis, intermittent SOB from pulmonary edema requiring 02, and a hemorrhoid flare up. Diarrhea and SOB improved towards the end of the hospital stay.     History of Present Ilness:   Guillaume Salinas (Guillaume) is a pleasant 69 y.o.male with a past medical history of MDS who is status post haploidentical stem cell transplantation conditioned with FluMel 100 + PTCy+ 2Gy TBI who is currently day +84  who presents for post-transplant follow up. Offered  through language services, declined - daughter assisted.     Interval History:     Patient presents for routine follow-up. He reports doing well. He saw palliative care yesterday and felt it went well. He has noticed some lower leg weakness like his legs might give out. He has a PT consult on  12/20/2024 to help get his strength back. His appetite is great with him eating three full meals daily with snacks without no nausea/emesis.  No rash. No pruritis. No diarrhea.  His restless leg syndrome and insomnia are improving. He has not been able to receive promacta due to cost but we are working on getting a aditya to help.  Home health ended yesterday.    PAST MEDICAL HISTORY:   Past Medical History:   Diagnosis Date    Anticoagulant long-term use     Coronary artery disease     Hypertension     Myelodysplastic syndrome     Peripheral vascular disease, unspecified        PAST SURGICAL HISTORY:   Past Surgical History:   Procedure Laterality Date    BONE MARROW BIOPSY Left 2023    Procedure: Biopsy-bone marrow;  Surgeon: Harry Diamond MD;  Location: [facility] OR;  Service: Oncology;  Laterality: Left;    COLONOSCOPY N/A 2022    Procedure: COLONOSCOPY Golytely Vaccinated will bring cards;  Surgeon: Dereje Simon MD;  Location: [facility] ENDO;  Service: Endoscopy;  Laterality: N/A;  Do not cancel this order    INSERTION OF LEMONS CATHETER Right 2024    Procedure: INSERTION, CATHETER, CENTRAL VENOUS, LEMONS TRIPLE LUMEN;  Surgeon: Kg Patten MD;  Location: [address];  Service: General;  Laterality: Right;     PAST SOCIAL HISTORY:  Social History     Socioeconomic History    Marital status:    Tobacco Use    Smoking status: Former     Current packs/day: 0.00     Average packs/day: 0.3 packs/day for 50.0 years (12.5 ttl pk-yrs)     Types: Cigarettes     Start date: 3/1/1973     Quit date: 3/1/2023     Years since quittin.7     Passive exposure: Past    Smokeless tobacco: Never   Substance and Sexual Activity    Alcohol use: Not Currently    Drug use: Never    Sexual activity: Not Currently     Partners: Female     Social Drivers of Health     Financial Resource Strain: Low Risk  (2024)    Overall Financial Resource Strain (CARDIA)     Difficulty of Paying Living Expenses: Not hard at all   Food Insecurity: No Food Insecurity (2024)    Hunger Vital Sign     Worried About Running Out of Food in the Last Year: Never true     Ran Out of Food in the Last Year: Never true   Transportation Needs: No Transportation Needs (2024)    TRANSPORTATION NEEDS     Transportation : No   Physical  Activity: Insufficiently Active (8/9/2024)    Exercise Vital Sign     Days of Exercise per Week: 2 days     Minutes of Exercise per Session: 60 min   Stress: Stress Concern Present (9/30/2024)    [employer] - Occupational Stress Questionnaire     Feeling of Stress : To some extent   Housing Stability: Low Risk  (9/30/2024)    Housing Stability Vital Sign     Unable to Pay for Housing in the Last Year: No     Homeless in the Last Year: No       FAMILY HISTORY:  Cancer-related family history includes Cancer in his brother and father.    CURRENT MEDICATIONS:   Medication List with Changes/Refills   Current Medications    ACYCLOVIR (ZOVIRAX) 800 MG TAB    Take 1 tablet (800 mg total) by mouth 2 (two) times daily.    BUDESONIDE (ENTOCORT EC) 3 MG CAPSULE    Take 1 capsule (3 mg total) by mouth 3 (three) times daily.    CARVEDILOL (COREG) 6.25 MG TABLET    Take 1 tablet (6.25 mg total) by mouth 2 (two) times daily.    COPPER GLUCONATE 2 MG CAP    Take 2 mg by mouth once daily.    ELTROMBOPAG OLAMINE (PROMACTA) 50 MG TAB    Take 1 tablet (50 mg total) by mouth once daily.    FOLIC ACID (FOLVITE) 1 MG TABLET    Take 1 tablet (1 mg total) by mouth once daily.    GABAPENTIN (NEURONTIN) 300 MG CAPSULE    Take 2 capsules (600 mg total) by mouth every evening.    LETERMOVIR (PREVYMIS) 480 MG TAB    Take 1 tablet (480 mg total) by mouth Daily.    LIDOCAINE (LIDODERM) 5 %    Place 1 patch onto the skin once daily. Remove & Discard patch within 12 hours or as directed by MD. Place patch to left shoulder.    LOPERAMIDE (IMODIUM) 2 MG CAPSULE    Take 1 capsule (2 mg total) by mouth 2 (two) times daily as needed for Diarrhea.    MAGNESIUM OXIDE (MAG-OX) 400 MG (241.3 MG MAGNESIUM) TABLET    Take 2 tablets (800 mg total) by mouth 2 (two) times daily.    ONDANSETRON (ZOFRAN-ODT) 8 MG TBDL    Take 1 tablet (8 mg total) by mouth every 8 (eight) hours as needed (nausea/vomiting).    PANTOPRAZOLE (PROTONIX) 40  MG TABLET    Take 1 tablet (40 mg total) by mouth once daily.    POSACONAZOLE (NOXAFIL) 100 MG TBEC TABLET    Take 3 tablets (300 mg total) by mouth once daily.    PROCHLORPERAZINE (COMPAZINE) 5 MG TABLET    Take 1 tablet (5 mg total) by mouth 4 (four) times daily as needed for Nausea.    ROPINIROLE (REQUIP) 0.5 MG TABLET    Take 1 tablet (0.5 mg total) by mouth every evening.    SULFAMETHOXAZOLE-TRIMETHOPRIM 800-160MG (BACTRIM DS) 800-160 MG TAB    Take 1 tablet by mouth every Mon, Wed, Fri. START 10/21/24    TACROLIMUS (PROGRAF) 0.5 MG CAP    Take 2 capsules (1 mg total) by mouth every morning AND 1 capsule (0.5 mg total) every evening.    TRAMADOL (ULTRAM) 50 MG TABLET    Take 1 tablet (50 mg total) by mouth every 6 (six) hours as needed for Pain.    ZOLPIDEM (AMBIEN) 5 MG TAB    Take 1 tablet (5 mg total) by mouth nightly as needed (insomnia).       ALLERGIES:   Review of patient's allergies indicates:  No Known Allergies    GVHD Review of Systems:     Pertinent positives and negatives included in the HPI. Otherwise a 14 point review of systems is negative. GVHD review of systems recorded in BMT flowsheet.     Physical Exam:     Vitals:    12/12/24 0901   BP: 137/67   Pulse: 69   Temp: 97.7 °F (36.5 °C)         General: Appears well, NAD.   HEENT: MMM, no OP lesions  Pulmonary: CTAB, no increased work of breathing, no W/R/C  Cardiovascular: S1S2 normal, RRR, no M/R/G  Abdominal: Soft, NT, ND, BS+, no HSM  Extremities: No C/C/E  Neurological: AAOx4, grossly normal, no focal deficits  Dermatologic: No appreciable rashes or lesions    ECOG Performance Status: (foot note - ECOG PS provided by [employer] Co[employer]cology [employer]) 1 - Symptomatic but completely ambulatory    Karnofsky Performance Score:  80%- Normal Activity with Effort: Some Symptoms of Disease    Labs:   Lab Results   Component Value Date    WBC 2.06 (L) 12/09/2024    HGB 10.9 (L) 12/09/2024    HCT 33.3 (L) 12/09/2024     (H) 12/09/2024    PLT  20 (LL) 12/09/2024        CMP  Sodium   Date Value Ref Range Status   12/09/2024 143 136 - 145 mmol/L Final     Potassium   Date Value Ref Range Status   12/09/2024 4.3 3.5 - 5.1 mmol/L Final     Chloride   Date Value Ref Range Status   12/09/2024 108 95 - 110 mmol/L Final     CO2   Date Value Ref Range Status   12/09/2024 26 23 - 29 mmol/L Final     Glucose   Date Value Ref Range Status   12/09/2024 127 (H) 70 - 110 mg/dL Final     BUN   Date Value Ref Range Status   12/09/2024 20 8 - 23 mg/dL Final     Creatinine   Date Value Ref Range Status   12/09/2024 0.7 0.5 - 1.4 mg/dL Final     Calcium   Date Value Ref Range Status   12/09/2024 9.0 8.7 - 10.5 mg/dL Final     Total Protein   Date Value Ref Range Status   12/09/2024 5.2 (L) 6.0 - 8.4 g/dL Final     Albumin   Date Value Ref Range Status   12/09/2024 3.2 (L) 3.5 - 5.2 g/dL Final     Total Bilirubin   Date Value Ref Range Status   12/09/2024 0.4 0.1 - 1.0 mg/dL Final     Comment:     For infants and newborns, interpretation of results should be based  on gestational age, weight and in agreement with clinical  observations.    Premature Infant recommended reference ranges:  Up to 24 hours.............<8.0 mg/dL  Up to 48 hours............<12.0 mg/dL  3-5 days..................<15.0 mg/dL  6-29 days.................<15.0 mg/dL       Alkaline Phosphatase   Date Value Ref Range Status   12/09/2024 92 40 - 150 U/L Final     AST   Date Value Ref Range Status   12/09/2024 25 10 - 40 U/L Final     ALT   Date Value Ref Range Status   12/09/2024 43 10 - 44 U/L Final     Anion Gap   Date Value Ref Range Status   12/09/2024 9 8 - 16 mmol/L Final     eGFR   Date Value Ref Range Status   12/09/2024 >60.0 >60 mL/min/1.73 m^2 Final          Imaging:   Hospital imaging reviewed.    Pathology:  Prior pathology reviewed. Plan for day +30 bone marrow biopsy on 10/23/24    Acute GVHD Scoring:  GVHD Acute Assessment      No GVHD at this time. Unable to record via flowsheets.                Assessment and Plan:   Guillaume Salinas (Guillaume) is a pleasant 69 y.o.male with a past medical history of MDS s/p haplo SCT who presents for post-transplant follow up.    MDS: Status post treatment with azacitidine 75 mg/m2 daily x7 days plus venetoclax 100mg (voriconazole) daily x14 of 28 days.Venetoclax decreased to 7 days with cycle 3 until completion of 11 cycles. No plans for maintenance at this time.     Status post allogeneic stem cell transplantation: As noted above, status post haploidentical stem cell transplantation conditioned with FluMel 100 + PTCy+ 2Gy TBI . Currently Day+ 84. Engrafted on 10/09/24 day +20.  Day 30 bone marrow (11/5/2024) showing mildly hypercellular marrow with no increased blast (0.3%) and no evidence of myeloid neoplasm. Pending chimerisms, NGS, and CG  Day 100 bone marrow biopsy on 12/30/24  Will plan to remove his port soon   3.    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 Persistent nausea despite anti-emetics, reducing pill burden. 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 He started budesonide 3mg TID on 10/28/24. Due to persistent nausea despite budesonide so started systemic steroids 11/1 at 0.5 mg/kg and his steroid taper has been given to him. Steroid taper completed 12/8/24.  Current tacro dose: 2 capsules (1mg) in the morning and 1 capsules (0.5 mg) in the evening.   Last tacro level: 8.6   Adjustments: none    4.       Immunosuppression: Prevention with posaconazole, acyclovir. CMV prophylaxis with letermovir. PJP prophyalxis Bactrim MWF. Continue weekly monitoring of CMV and EBV.  Last CMV: Not-detected, last EBV: Not-detected.   Active infections: N/A    Pancytopenia: Due to underlying disease and chemotherapy. Transfuseu for Hgb <7 g/dL and platelets <10k. Folate and copper deficient, on supplementation. Will continue to monitor to see if platelets begin to rise  with his supplements. Promacta sent in on 12/9/24.    Folic Acid deficiency   Folic acid borderline low at 4.3, taking folic acid 1mg daily     Copper deficiency   Copper level of 635, he is taking a daily OTC Copper supplement.     Hypomagnesemia: Related to tacro, poor po intake. 1.5 today.   Continue MagOx to 800mg twice daily.      Chemotherapy Induced Nausea: Resolved with steroids. Will taper budesonide. Patient has received his taper in [place].      Anxiety, Restless Leg Syndrome: Noted since discharge by family. He started ropinirole 0.5mg and has experienced significant improvement.    Insomnia: Patient with significant improvement. Still wakes up a couple of times during the night but feels well rested in the mornings. Continue Ambien and Ropinirole for RLS.     Follow up: Twice weekly follow up with labs.    Orders Placed:           Medical Complexity:   Visit today included increased complexity associated with the care of the episodic problems addressed above and managing the longitudinal care of the patient due to the serious and/or complex managed problem(s) history of allo SCT.      Follow Up:      Twice weekly follow up as scheduled.       A total of 30 minutes was spent in pre-visit chart review, personal interpretation of labs and imaging, and medication review. Total visit time 40 minutes, >50 % counseling.     Lawson Currie MD  Hematology/Oncology Fellow PGY-V

## 2024-12-13 ENCOUNTER — PATIENT MESSAGE (OUTPATIENT)
Dept: HEMATOLOGY/ONCOLOGY | Facility: CLINIC | Age: 69
End: 2024-12-13
Payer: MEDICARE

## 2024-12-13 LAB
CMV DNA SPEC QL NAA+PROBE: NORMAL
CYTOMEGALOVIRUS PCR, QUANT: NOT DETECTED IU/ML
EPSTEIN-BARR VIRUS DNA: ABNORMAL
EPSTEIN-BARR VIRUS PCR, QUANT: ABNORMAL IU/ML

## 2024-12-16 ENCOUNTER — INFUSION (OUTPATIENT)
Dept: INFUSION THERAPY | Facility: HOSPITAL | Age: 69
End: 2024-12-16
Payer: MEDICARE

## 2024-12-16 ENCOUNTER — PATIENT MESSAGE (OUTPATIENT)
Dept: HEMATOLOGY/ONCOLOGY | Facility: CLINIC | Age: 69
End: 2024-12-16

## 2024-12-16 ENCOUNTER — OFFICE VISIT (OUTPATIENT)
Dept: HEMATOLOGY/ONCOLOGY | Facility: CLINIC | Age: 69
End: 2024-12-16
Payer: MEDICARE

## 2024-12-16 ENCOUNTER — CLINICAL SUPPORT (OUTPATIENT)
Dept: HEMATOLOGY/ONCOLOGY | Facility: CLINIC | Age: 69
End: 2024-12-16
Payer: MEDICARE

## 2024-12-16 ENCOUNTER — TELEPHONE (OUTPATIENT)
Dept: HEMATOLOGY/ONCOLOGY | Facility: CLINIC | Age: 69
End: 2024-12-16

## 2024-12-16 VITALS
RESPIRATION RATE: 16 BRPM | OXYGEN SATURATION: 98 % | BODY MASS INDEX: 21.27 KG/M2 | DIASTOLIC BLOOD PRESSURE: 77 MMHG | HEIGHT: 69 IN | SYSTOLIC BLOOD PRESSURE: 151 MMHG | HEART RATE: 71 BPM | WEIGHT: 143.63 LBS | TEMPERATURE: 98 F

## 2024-12-16 DIAGNOSIS — D84.81 IMMUNODEFICIENCY DUE TO CONDITIONS CLASSIFIED ELSEWHERE: ICD-10-CM

## 2024-12-16 DIAGNOSIS — D46.9 MYELODYSPLASTIC SYNDROME: Primary | ICD-10-CM

## 2024-12-16 DIAGNOSIS — D46.9 MYELODYSPLASTIC SYNDROME: ICD-10-CM

## 2024-12-16 DIAGNOSIS — Z94.84 IMMUNOCOMPROMISED STATE ASSOCIATED WITH STEM CELL TRANSPLANT: ICD-10-CM

## 2024-12-16 DIAGNOSIS — Z76.82 STEM CELL TRANSPLANT CANDIDATE: ICD-10-CM

## 2024-12-16 DIAGNOSIS — Z94.81 S/P ALLOGENEIC BONE MARROW TRANSPLANT: Primary | ICD-10-CM

## 2024-12-16 DIAGNOSIS — D84.822 IMMUNOCOMPROMISED STATE ASSOCIATED WITH STEM CELL TRANSPLANT: ICD-10-CM

## 2024-12-16 DIAGNOSIS — D61.818 PANCYTOPENIA: ICD-10-CM

## 2024-12-16 LAB
ABO + RH BLD: NORMAL
ALBUMIN SERPL BCP-MCNC: 3.3 G/DL (ref 3.5–5.2)
ALP SERPL-CCNC: 77 U/L (ref 40–150)
ALT SERPL W/O P-5'-P-CCNC: 41 U/L (ref 10–44)
ANION GAP SERPL CALC-SCNC: 5 MMOL/L (ref 8–16)
AST SERPL-CCNC: 19 U/L (ref 10–40)
BASOPHILS # BLD AUTO: 0 K/UL (ref 0–0.2)
BASOPHILS NFR BLD: 0 % (ref 0–1.9)
BILIRUB SERPL-MCNC: 0.4 MG/DL (ref 0.1–1)
BLD GP AB SCN CELLS X3 SERPL QL: NORMAL
BUN SERPL-MCNC: 20 MG/DL (ref 8–23)
CALCIUM SERPL-MCNC: 8.8 MG/DL (ref 8.7–10.5)
CHLORIDE SERPL-SCNC: 108 MMOL/L (ref 95–110)
CMV DNA SPEC QL NAA+PROBE: NORMAL
CO2 SERPL-SCNC: 29 MMOL/L (ref 23–29)
CREAT SERPL-MCNC: 0.7 MG/DL (ref 0.5–1.4)
CYTOMEGALOVIRUS PCR, QUANT: NOT DETECTED IU/ML
DIFFERENTIAL METHOD BLD: ABNORMAL
EOSINOPHIL # BLD AUTO: 0 K/UL (ref 0–0.5)
EOSINOPHIL NFR BLD: 0 % (ref 0–8)
EPSTEIN-BARR VIRUS DNA: ABNORMAL
EPSTEIN-BARR VIRUS PCR, QUANT: ABNORMAL IU/ML
ERYTHROCYTE [DISTWIDTH] IN BLOOD BY AUTOMATED COUNT: 19.9 % (ref 11.5–14.5)
EST. GFR  (NO RACE VARIABLE): >60 ML/MIN/1.73 M^2
GLUCOSE SERPL-MCNC: 97 MG/DL (ref 70–110)
HCT VFR BLD AUTO: 32.4 % (ref 40–54)
HGB BLD-MCNC: 11.3 G/DL (ref 14–18)
IMM GRANULOCYTES # BLD AUTO: 0.01 K/UL (ref 0–0.04)
IMM GRANULOCYTES NFR BLD AUTO: 0.6 % (ref 0–0.5)
LYMPHOCYTES # BLD AUTO: 0.2 K/UL (ref 1–4.8)
LYMPHOCYTES NFR BLD: 14.8 % (ref 18–48)
MAGNESIUM SERPL-MCNC: 1.6 MG/DL (ref 1.6–2.6)
MCH RBC QN AUTO: 39.1 PG (ref 27–31)
MCHC RBC AUTO-ENTMCNC: 34.9 G/DL (ref 32–36)
MCV RBC AUTO: 112 FL (ref 82–98)
MONOCYTES # BLD AUTO: 0.2 K/UL (ref 0.3–1)
MONOCYTES NFR BLD: 9.9 % (ref 4–15)
NEUTROPHILS # BLD AUTO: 1.2 K/UL (ref 1.8–7.7)
NEUTROPHILS NFR BLD: 74.7 % (ref 38–73)
NRBC BLD-RTO: 0 /100 WBC
PHOSPHATE SERPL-MCNC: 3.7 MG/DL (ref 2.7–4.5)
PLATELET # BLD AUTO: 15 K/UL (ref 150–450)
PLATELET BLD QL SMEAR: ABNORMAL
PMV BLD AUTO: 9.6 FL (ref 9.2–12.9)
POTASSIUM SERPL-SCNC: 4.2 MMOL/L (ref 3.5–5.1)
PROT SERPL-MCNC: 5.3 G/DL (ref 6–8.4)
RBC # BLD AUTO: 2.89 M/UL (ref 4.6–6.2)
SODIUM SERPL-SCNC: 142 MMOL/L (ref 136–145)
SPECIMEN OUTDATE: NORMAL
TACROLIMUS BLD-MCNC: 10.6 NG/ML (ref 5–15)
WBC # BLD AUTO: 1.62 K/UL (ref 3.9–12.7)

## 2024-12-16 PROCEDURE — 85025 COMPLETE CBC W/AUTO DIFF WBC: CPT | Performed by: INTERNAL MEDICINE

## 2024-12-16 PROCEDURE — 36591 DRAW BLOOD OFF VENOUS DEVICE: CPT

## 2024-12-16 PROCEDURE — 83735 ASSAY OF MAGNESIUM: CPT | Performed by: INTERNAL MEDICINE

## 2024-12-16 PROCEDURE — 99999 PR PBB SHADOW E&M-EST. PATIENT-LVL II: CPT | Mod: PBBFAC,,,

## 2024-12-16 PROCEDURE — 86900 BLOOD TYPING SEROLOGIC ABO: CPT | Performed by: INTERNAL MEDICINE

## 2024-12-16 PROCEDURE — 63600175 PHARM REV CODE 636 W HCPCS: Performed by: INTERNAL MEDICINE

## 2024-12-16 PROCEDURE — 99999 PR PBB SHADOW E&M-EST. PATIENT-LVL IV: CPT | Mod: PBBFAC,,, | Performed by: INTERNAL MEDICINE

## 2024-12-16 PROCEDURE — 84100 ASSAY OF PHOSPHORUS: CPT | Performed by: INTERNAL MEDICINE

## 2024-12-16 PROCEDURE — 25000003 PHARM REV CODE 250: Performed by: INTERNAL MEDICINE

## 2024-12-16 PROCEDURE — A4216 STERILE WATER/SALINE, 10 ML: HCPCS | Performed by: INTERNAL MEDICINE

## 2024-12-16 PROCEDURE — 80053 COMPREHEN METABOLIC PANEL: CPT | Performed by: INTERNAL MEDICINE

## 2024-12-16 PROCEDURE — 80197 ASSAY OF TACROLIMUS: CPT | Performed by: INTERNAL MEDICINE

## 2024-12-16 PROCEDURE — 87799 DETECT AGENT NOS DNA QUANT: CPT | Performed by: INTERNAL MEDICINE

## 2024-12-16 RX ORDER — SODIUM CHLORIDE 0.9 % (FLUSH) 0.9 %
10 SYRINGE (ML) INJECTION
Status: CANCELLED | OUTPATIENT
Start: 2024-12-16

## 2024-12-16 RX ORDER — HEPARIN 100 UNIT/ML
500 SYRINGE INTRAVENOUS
Status: CANCELLED | OUTPATIENT
Start: 2024-12-16

## 2024-12-16 RX ORDER — HEPARIN 100 UNIT/ML
500 SYRINGE INTRAVENOUS
Status: DISCONTINUED | OUTPATIENT
Start: 2024-12-16 | End: 2024-12-16 | Stop reason: HOSPADM

## 2024-12-16 RX ORDER — POSACONAZOLE 100 MG/1
300 TABLET, DELAYED RELEASE ORAL DAILY
Qty: 90 TABLET | Refills: 11 | Status: ACTIVE | OUTPATIENT
Start: 2024-12-16

## 2024-12-16 RX ORDER — SODIUM CHLORIDE 0.9 % (FLUSH) 0.9 %
10 SYRINGE (ML) INJECTION
Status: DISCONTINUED | OUTPATIENT
Start: 2024-12-16 | End: 2024-12-16 | Stop reason: HOSPADM

## 2024-12-16 RX ADMIN — Medication 10 ML: at 08:12

## 2024-12-16 RX ADMIN — HEPARIN SODIUM (PORCINE) LOCK FLUSH IV SOLN 100 UNIT/ML 500 UNITS: 100 SOLUTION at 08:12

## 2024-12-16 NOTE — PROGRESS NOTES
Section of Hematology and Stem Cell Transplantation    Post-Transplantation Follow Up Visit     12/16/2024    Transplant History:   Primary oncologist: Paco Hickey MD  Primary oncologic diagnosis: MDS  Transplant date: 9/19/2024  Donor: haploidentical  Blood Type (Patient): B +  Blood Type (Donor): A +  CMV (Patient): Positive  CMV (Donor): Positive  Graft source: Bone marrow  CD34+ cell dose: 3.55x10^6  Conditioning Regimen: Fludarabine plus melphalan 100 mg/m2 + 2Gy TBI  GVHD prophylaxis: Post-transplant cyclophosphamide, Tacrolimus, MMF  Immediate post-transplant complications: Patient experienced expected GI toxicities with C diff negative diarrhea and nausea, neutropenic fever with negative infectious work up, expected cytopenias requiring transfusions, electrolyte abnormalities requiring replacement, volume overload requiring diuresis, intermittent SOB from pulmonary edema requiring 02, and a hemorrhoid flare up. Diarrhea and SOB improved towards the end of the hospital stay.     History of Present Ilness:   Guillaume Salinas (Guillaume) is a pleasant 69 y.o.male with a past medical history of MDS who is status post haploidentical stem cell transplantation conditioned with FluMel 100 + PTCy+ 2Gy TBI who is currently day +88  who presents for post-transplant follow up. Offered  through language services, declined - daughter assisted.     Interval History:   Patient presents for routine follow-up. He reports doing well.His appetite is great with him eating three full meals daily with snacks without no nausea/emesis.  No rash. No pruritis. No diarrhea.  His restless leg syndrome and insomnia are improving. He has not been able to receive promacta due to cost but we are working on getting a aditya to help. Yesterday he had his first three digits of both his hands lock up. He had to manually bend his fingers and the problem resolved. This happened three times yesterday and has not happened since  and has never happened before. He denies any pain during these episodes, just severe stiffness. Likely due to electrolytes, magnesium low/normal.     PAST MEDICAL HISTORY:   Past Medical History:   Diagnosis Date    Anticoagulant long-term use     Coronary artery disease     Hypertension     Myelodysplastic syndrome     Peripheral vascular disease, unspecified        PAST SURGICAL HISTORY:   Past Surgical History:   Procedure Laterality Date    BONE MARROW BIOPSY Left 2023    Procedure: Biopsy-bone marrow;  Surgeon: Harry Diamond MD;  Location: [facility] OR;  Service: Oncology;  Laterality: Left;    COLONOSCOPY N/A 2022    Procedure: COLONOSCOPY Golytely Vaccinated will bring cards;  Surgeon: Dereje Simon MD;  Location: [facility] ENDO;  Service: Endoscopy;  Laterality: N/A;  Do not cancel this order    INSERTION OF LEMONS CATHETER Right 2024    Procedure: INSERTION, CATHETER, CENTRAL VENOUS, LEMONS TRIPLE LUMEN;  Surgeon: Kg Patten MD;  Location: [address];  Service: General;  Laterality: Right;     PAST SOCIAL HISTORY:  Social History     Socioeconomic History    Marital status:    Tobacco Use    Smoking status: Former     Current packs/day: 0.00     Average packs/day: 0.3 packs/day for 50.0 years (12.5 ttl pk-yrs)     Types: Cigarettes     Start date: 3/1/1973     Quit date: 3/1/2023     Years since quittin.7     Passive exposure: Past    Smokeless tobacco: Never   Substance and Sexual Activity    Alcohol use: Not Currently    Drug use: Never    Sexual activity: Not Currently     Partners: Female     Social Drivers of Health     Financial Resource Strain: Low Risk  (2024)    Overall Financial Resource Strain (CARDIA)     Difficulty of Paying Living Expenses: Not hard at all   Food Insecurity: No Food Insecurity (2024)    Hunger Vital Sign     Worried About Running Out of Food in the Last Year: Never true     Ran Out of Food in the Last Year: Never true    Transportation Needs: No Transportation Needs (9/30/2024)    TRANSPORTATION NEEDS     Transportation : No   Physical Activity: Insufficiently Active (8/9/2024)    Exercise Vital Sign     Days of Exercise per Week: 2 days     Minutes of Exercise per Session: 60 min   Stress: Stress Concern Present (9/30/2024)    [employer] - Occupational Stress Questionnaire     Feeling of Stress : To some extent   Housing Stability: Low Risk  (9/30/2024)    Housing Stability Vital Sign     Unable to Pay for Housing in the Last Year: No     Homeless in the Last Year: No       FAMILY HISTORY:  Cancer-related family history includes Cancer in his brother and father.    CURRENT MEDICATIONS:   Medication List with Changes/Refills   Current Medications    ACYCLOVIR (ZOVIRAX) 800 MG TAB    Take 1 tablet (800 mg total) by mouth 2 (two) times daily.    BUDESONIDE (ENTOCORT EC) 3 MG CAPSULE    Take 1 capsule (3 mg total) by mouth 2 (two) times a day for 5 days, THEN 1 capsule (3 mg total) once daily for 5 days.    CARVEDILOL (COREG) 6.25 MG TABLET    Take 1 tablet (6.25 mg total) by mouth 2 (two) times daily.    COPPER GLUCONATE 2 MG CAP    Take 2 mg by mouth once daily.    ELTROMBOPAG OLAMINE (PROMACTA) 50 MG TAB    Take 1 tablet (50 mg total) by mouth once daily.    FOLIC ACID (FOLVITE) 1 MG TABLET    Take 1 tablet (1 mg total) by mouth once daily.    GABAPENTIN (NEURONTIN) 300 MG CAPSULE    Take 2 capsules (600 mg total) by mouth every evening.    LETERMOVIR (PREVYMIS) 480 MG TAB    Take 1 tablet (480 mg total) by mouth Daily.    LIDOCAINE (LIDODERM) 5 %    Place 1 patch onto the skin once daily. Remove & Discard patch within 12 hours or as directed by MD. Place patch to left shoulder.    LOPERAMIDE (IMODIUM) 2 MG CAPSULE    Take 1 capsule (2 mg total) by mouth 2 (two) times daily as needed for Diarrhea.    MAGNESIUM OXIDE (MAG-OX) 400 MG (241.3 MG MAGNESIUM) TABLET    Take 2 tablets (800 mg total) by mouth  2 (two) times daily.    ONDANSETRON (ZOFRAN-ODT) 8 MG TBDL    Take 1 tablet (8 mg total) by mouth every 8 (eight) hours as needed (nausea/vomiting).    PANTOPRAZOLE (PROTONIX) 40 MG TABLET    Take 1 tablet (40 mg total) by mouth once daily.    POSACONAZOLE (NOXAFIL) 100 MG TBEC TABLET    Take 3 tablets (300 mg total) by mouth once daily.    PROCHLORPERAZINE (COMPAZINE) 5 MG TABLET    Take 1 tablet (5 mg total) by mouth 4 (four) times daily as needed for Nausea.    ROPINIROLE (REQUIP) 0.5 MG TABLET    Take 1 tablet (0.5 mg total) by mouth every evening.    SULFAMETHOXAZOLE-TRIMETHOPRIM 800-160MG (BACTRIM DS) 800-160 MG TAB    Take 1 tablet by mouth every Mon, Wed, Fri. START 10/21/24    TACROLIMUS (PROGRAF) 0.5 MG CAP    Take 2 capsules (1 mg total) by mouth every morning AND 1 capsule (0.5 mg total) every evening.    TRAMADOL (ULTRAM) 50 MG TABLET    Take 1 tablet (50 mg total) by mouth every 6 (six) hours as needed for Pain.    ZOLPIDEM (AMBIEN) 5 MG TAB    Take 1 tablet (5 mg total) by mouth nightly as needed (insomnia).       ALLERGIES:   Review of patient's allergies indicates:  No Known Allergies    GVHD Review of Systems:     Pertinent positives and negatives included in the HPI. Otherwise a 14 point review of systems is negative. GVHD review of systems recorded in BMT flowsheet.     Physical Exam:     Vitals:    12/16/24 0844   BP: (!) 151/77   Pulse: 71   Resp: 16   Temp: 98.2 °F (36.8 °C)     General: Appears well, NAD.   HEENT: MMM, no OP lesions  Pulmonary: CTAB, no increased work of breathing, no W/R/C  Cardiovascular: S1S2 normal, RRR, no M/R/G  Abdominal: Soft, NT, ND, BS+, no HSM  Extremities: No C/C/E  Neurological: AAOx4, grossly normal, no focal deficits  Dermatologic: No appreciable rashes or lesions    ECOG Performance Status: (foot note - ECOG PS provided by Eastern Cooperative Oncology Group) 1 - Symptomatic but completely ambulatory    Karnofsky Performance Score:  80%- Normal Activity with  Effort: Some Symptoms of Disease    Labs:   Lab Results   Component Value Date    WBC 1.62 (LL) 12/16/2024    HGB 11.3 (L) 12/16/2024    HCT 32.4 (L) 12/16/2024     (H) 12/16/2024    PLT 15 (LL) 12/16/2024        CMP  Sodium   Date Value Ref Range Status   12/16/2024 142 136 - 145 mmol/L Final     Potassium   Date Value Ref Range Status   12/16/2024 4.2 3.5 - 5.1 mmol/L Final     Chloride   Date Value Ref Range Status   12/16/2024 108 95 - 110 mmol/L Final     CO2   Date Value Ref Range Status   12/16/2024 29 23 - 29 mmol/L Final     Glucose   Date Value Ref Range Status   12/16/2024 97 70 - 110 mg/dL Final     BUN   Date Value Ref Range Status   12/16/2024 20 8 - 23 mg/dL Final     Creatinine   Date Value Ref Range Status   12/16/2024 0.7 0.5 - 1.4 mg/dL Final     Calcium   Date Value Ref Range Status   12/16/2024 8.8 8.7 - 10.5 mg/dL Final     Total Protein   Date Value Ref Range Status   12/16/2024 5.3 (L) 6.0 - 8.4 g/dL Final     Albumin   Date Value Ref Range Status   12/16/2024 3.3 (L) 3.5 - 5.2 g/dL Final     Total Bilirubin   Date Value Ref Range Status   12/16/2024 0.4 0.1 - 1.0 mg/dL Final     Comment:     For infants and newborns, interpretation of results should be based  on gestational age, weight and in agreement with clinical  observations.    Premature Infant recommended reference ranges:  Up to 24 hours.............<8.0 mg/dL  Up to 48 hours............<12.0 mg/dL  3-5 days..................<15.0 mg/dL  6-29 days.................<15.0 mg/dL       Alkaline Phosphatase   Date Value Ref Range Status   12/16/2024 77 40 - 150 U/L Final     AST   Date Value Ref Range Status   12/16/2024 19 10 - 40 U/L Final     ALT   Date Value Ref Range Status   12/16/2024 41 10 - 44 U/L Final     Anion Gap   Date Value Ref Range Status   12/16/2024 5 (L) 8 - 16 mmol/L Final     eGFR   Date Value Ref Range Status   12/16/2024 >60.0 >60 mL/min/1.73 m^2 Final          Imaging:   Hospital imaging  reviewed.    Pathology:  Prior pathology reviewed. Plan for day +30 bone marrow biopsy on 10/23/24    Acute GVHD Scoring:  GVHD Acute Assessment      No GVHD at this time. Unable to record via flowsheets.               Assessment and Plan:   Guillaume Salinas (Guillaume) is a pleasant 69 y.o.male with a past medical history of MDS s/p haplo SCT who presents for post-transplant follow up.    MDS: Status post treatment with azacitidine 75 mg/m2 daily x7 days plus venetoclax 100mg (voriconazole) daily x14 of 28 days.Venetoclax decreased to 7 days with cycle 3 until completion of 11 cycles. No plans for maintenance at this time.     Status post allogeneic stem cell transplantation: As noted above, status post haploidentical stem cell transplantation conditioned with FluMel 100 + PTCy+ 2Gy TBI . Currently Day+ 88. Engrafted on 10/09/24 day +20.  Day 30 bone marrow (11/5/2024) showing mildly hypercellular marrow with no increased blast (0.3%) and no evidence of myeloid neoplasm. Pending chimerisms, NGS, and CG  Day 100 bone marrow biopsy on 12/31/24  Will plan to remove his port soon     3.    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 Persistent nausea despite anti-emetics, reducing pill burden. 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 He started budesonide 3mg TID on 10/28/24. Due to persistent nausea despite budesonide so started systemic steroids 11/1 at 0.5 mg/kg and his steroid taper has been given to him. Steroid taper completed 12/8/24.  Current tacro dose: 2 capsules (1mg) in the morning and 1 capsules (0.5 mg) in the evening.   Last tacro level: 8.6   Adjustments: Pending level today     4.       Immunosuppression: Prevention with posaconazole, acyclovir. CMV prophylaxis with letermovir. PJP prophyalxis Bactrim MWF. Continue weekly monitoring of CMV and EBV.  Last CMV: Not-detected, last EBV: Not-detected.   Active  infections: N/A    Pancytopenia: Due to underlying disease and chemotherapy. Transfuseu for Hgb <7 g/dL and platelets <10k. Folate and copper deficient, on supplementation. Will continue to monitor to see if platelets begin to rise with his supplements. Promacta sent in on 12/9/24. Working on aditya for financial assistance for promacta.     Folic Acid deficiency   Folic acid borderline low at 4.3, taking folic acid 1mg daily     Copper deficiency   Copper level of 635, he is taking a daily OTC Copper supplement.     Hypomagnesemia: Related to tacro, poor po intake. 1.5 today.   Continue MagOx to 800mg twice daily.      Chemotherapy Induced Nausea: Resolved with steroids. Will taper budesonide. Patient has received his taper in [place].      Anxiety, Restless Leg Syndrome: Noted since discharge by family. He started ropinirole 0.5mg and has experienced significant improvement.    Insomnia: Patient with significant improvement. Still wakes up a couple of times during the night but feels well rested in the mornings. Continue Ambien and Ropinirole for RLS.     Follow up: Twice weekly follow up with labs.    Orders Placed:           Medical Complexity:   Visit today included increased complexity associated with the care of the episodic problems addressed above and managing the longitudinal care of the patient due to the serious and/or complex managed problem(s) history of allo SCT.      Follow Up:      Twice weekly follow up as scheduled.       A total of 30 minutes was spent in pre-visit chart review, personal interpretation of labs and imaging, and medication review. Total visit time 40 minutes, >50 % counseling.       Lawson Currie MD  Hematology/Oncology Fellow PGY-V

## 2024-12-16 NOTE — PROGRESS NOTES
BMT Pharmacist Medication Review Note     All current medications were reviewed with the patient and his caregiver. The patient brought in all of his medications with him to this visit.      We discussed the changes made since last meeting:   - Budesonide taper will complete on 12/21/24. (Was originally going to complete on 12/22, but 1 capsule short).   - Reviewed that since patient is approaching D+100 and his medication list will be cleaned up after completing the budesonide taper, will start spacing out pharmacy appointments. Will meet with the patient again in a week to provide an updated medication list and address any medication related questions/concerns, then will meet as needed.     - Patient reports not needing any of the PRN medications, with the exception of Zolpidem which he takes nightly for sleep.    The patient has ample supply of all medications except for Posaconazole. They requested a refill and will plan on picking it up this afternoon. Patient is aware that medication is not in pill box.     - Patient reports ample supply of Letermovir (3 boxes at home). Patient also reports working on filling out the paperwork to re-enroll in the PAP.   - Patient reports that he does not have Promacta on hand yet. PAP enrollment still pending.     Medicamentos/Medications Indicación/Indication Mañana/Morning Tarde/Afternoon Noche/Night   Acyclovir 800mg  Prevención de infecciones virales  Viral infection prevention 1 tableta  1 tableta   Letermovir (Prevymis®) 480mg Prevención de infección por CMV  CMV infection prevention 1 tableta     Posaconazole 100mg Prevencón de infecciones fungales  Fungal infection prevention 3 tabletas     Sulfamethoxazole-trimethoprim (Bactrim®) 800-160mg Fungal pneumonia prevention 1 tableta los lunes, miércoles y viernes (Mon., Wed., Fri)      **Tacrolimus 0.5mg Prevención de GVHD - NO tome la dosis de por la mañana en días que se relizará laboratorios  2 cápsula  1 cápsulas    **Budesonide 3 mg  GI GVHD 12/12/24 - 12/16/24: ansley 1 cápsula dos veces al día  12/17/24 - 12/21/24: ansley 1 onza cápsula al día  12/22/24: detener   Magnesium oxide 400mg Suplemento de magnesio 2 tabletas  2 tabletas      Ropinirole (Requip) 0.5 mg pierna inquieta   1 tableta   Pantoprazole (Protonix) 40 mg reflujo ácido   1 tableta     Carvedilol (Coreg®) 6.25 mg Presión arterial maeve  1 tableta  1 tableta   Gabapentin (Neurontin®) 300 mg Neuropatía   2 cápsulas   Copper Gluconate 2 mg Suplemento 1 cápsula     Folic Acid 1 mg Suplemento 1 tableta     **medicamento nuevo o cambios realizados al medicamento  MEDICAMENTOS CUANDO VI NECESARIOS/AS NEEDED MEDICATIONS:                                                                    Loperamida (Imodium®) 2 mg dos veces al día según sea necesario para la diarrea  Ondansetron 8mg hasta sosa veces al día según sea necesario para Náuseas/Vómito  Prochlorperazine (Compazine®) 5mg cada 6 horas as needed for Náuseas/Vómito                                                           Tramadol (Ultram®) 50 mg cada 6 horas cuando sea necesario para el dolor         Lidocaine Patch (Lidoderm) 5%: 1 parcho en el hombro milagros y  1 parcho en el hombro derecho. Remueva luego de las 12 horas. Aplicar diariamente según sea necesario para el dolor de hombro.  Zolpidem (Ambien) 5 mg tableta todas las noches según sea necesario para dormir     ya no stephany:                                                              Hold until told to restart: Cilostazol  Prednisone        All questions were answered.       Halima Thomas, PharmD  Clinical Pharmacist-BMT/Hematology  [facility]

## 2024-12-16 NOTE — PROGRESS NOTES
Section of Hematology and Stem Cell Transplantation    Post-Transplantation Follow Up Visit     12/15/2024    Transplant History:   Primary oncologist: Paco Hickey MD  Primary oncologic diagnosis: MDS  Transplant date: 9/19/2024  Donor: haploidentical  Blood Type (Patient): B +  Blood Type (Donor): A +  CMV (Patient): Positive  CMV (Donor): Positive  Graft source: Bone marrow  CD34+ cell dose: 3.55x10^6  Conditioning Regimen: Fludarabine plus melphalan 100 mg/m2 + 2Gy TBI  GVHD prophylaxis: Post-transplant cyclophosphamide, Tacrolimus, MMF  Immediate post-transplant complications: Patient experienced expected GI toxicities with C diff negative diarrhea and nausea, neutropenic fever with negative infectious work up, expected cytopenias requiring transfusions, electrolyte abnormalities requiring replacement, volume overload requiring diuresis, intermittent SOB from pulmonary edema requiring 02, and a hemorrhoid flare up. Diarrhea and SOB improved towards the end of the hospital stay.     History of Present Ilness:   Guillaume Salinas (Guillaume) is a pleasant 69 y.o.male with a past medical history of MDS who is status post haploidentical stem cell transplantation conditioned with FluMel 100 + PTCy+ 2Gy TBI who is currently day +87  who presents for post-transplant follow up. Offered  through language services, declined - daughter assisted.     Interval History:     Patient presents for routine follow-up. He reports doing well.His appetite is great with him eating three full meals daily with snacks without no nausea/emesis.  No rash. No pruritis. No diarrhea.  His restless leg syndrome and insomnia are improving. He has not been able to receive promacta due to cost but we are working on getting a aditya to help.    PAST MEDICAL HISTORY:   Past Medical History:   Diagnosis Date    Anticoagulant long-term use     Coronary artery disease     Hypertension     Myelodysplastic syndrome     Peripheral  vascular disease, unspecified        PAST SURGICAL HISTORY:   Past Surgical History:   Procedure Laterality Date    BONE MARROW BIOPSY Left 2023    Procedure: Biopsy-bone marrow;  Surgeon: Harry Diamond MD;  Location: [facility] OR;  Service: Oncology;  Laterality: Left;    COLONOSCOPY N/A 2022    Procedure: COLONOSCOPY Golytely Vaccinated will bring cards;  Surgeon: Dereje Simon MD;  Location: [facility] ENDO;  Service: Endoscopy;  Laterality: N/A;  Do not cancel this order    INSERTION OF LEMONS CATHETER Right 2024    Procedure: INSERTION, CATHETER, CENTRAL VENOUS, LEMONS TRIPLE LUMEN;  Surgeon: Kg Patten MD;  Location: [facility] OR [address] [place] [address];  Service: General;  Laterality: Right;     PAST SOCIAL HISTORY:  Social History     Socioeconomic History    Marital status:    Tobacco Use    Smoking status: Former     Current packs/day: 0.00     Average packs/day: 0.3 packs/day for 50.0 years (12.5 ttl pk-yrs)     Types: Cigarettes     Start date: 3/1/1973     Quit date: 3/1/2023     Years since quittin.7     Passive exposure: Past    Smokeless tobacco: Never   Substance and Sexual Activity    Alcohol use: Not Currently    Drug use: Never    Sexual activity: Not Currently     Partners: Female     Social Drivers of Health     Financial Resource Strain: Low Risk  (2024)    Overall Financial Resource Strain (CARDIA)     Difficulty of Paying Living Expenses: Not hard at all   Food Insecurity: No Food Insecurity (2024)    Hunger Vital Sign     Worried About Running Out of Food in the Last Year: Never true     Ran Out of Food in the Last Year: Never true   Transportation Needs: No Transportation Needs (2024)    TRANSPORTATION NEEDS     Transportation : No   Physical Activity: Insufficiently Active (2024)    Exercise Vital Sign     Days of Exercise per Week: 2 days     Minutes of Exercise per Session: 60 min   Stress: Stress Concern Present (2024)    [employer] - Occupational Stress Questionnaire     Feeling of Stress : To some extent   Housing Stability: Low Risk  (9/30/2024)    Housing Stability Vital Sign     Unable to Pay for Housing in the Last Year: No     Homeless in the Last Year: No       FAMILY HISTORY:  Cancer-related family history includes Cancer in his brother and father.    CURRENT MEDICATIONS:   Medication List with Changes/Refills   Current Medications    ACYCLOVIR (ZOVIRAX) 800 MG TAB    Take 1 tablet (800 mg total) by mouth 2 (two) times daily.    BUDESONIDE (ENTOCORT EC) 3 MG CAPSULE    Take 1 capsule (3 mg total) by mouth 2 (two) times a day for 5 days, THEN 1 capsule (3 mg total) once daily for 5 days.    CARVEDILOL (COREG) 6.25 MG TABLET    Take 1 tablet (6.25 mg total) by mouth 2 (two) times daily.    COPPER GLUCONATE 2 MG CAP    Take 2 mg by mouth once daily.    ELTROMBOPAG OLAMINE (PROMACTA) 50 MG TAB    Take 1 tablet (50 mg total) by mouth once daily.    FOLIC ACID (FOLVITE) 1 MG TABLET    Take 1 tablet (1 mg total) by mouth once daily.    GABAPENTIN (NEURONTIN) 300 MG CAPSULE    Take 2 capsules (600 mg total) by mouth every evening.    LETERMOVIR (PREVYMIS) 480 MG TAB    Take 1 tablet (480 mg total) by mouth Daily.    LIDOCAINE (LIDODERM) 5 %    Place 1 patch onto the skin once daily. Remove & Discard patch within 12 hours or as directed by MD. Place patch to left shoulder.    LOPERAMIDE (IMODIUM) 2 MG CAPSULE    Take 1 capsule (2 mg total) by mouth 2 (two) times daily as needed for Diarrhea.    MAGNESIUM OXIDE (MAG-OX) 400 MG (241.3 MG MAGNESIUM) TABLET    Take 2 tablets (800 mg total) by mouth 2 (two) times daily.    ONDANSETRON (ZOFRAN-ODT) 8 MG TBDL    Take 1 tablet (8 mg total) by mouth every 8 (eight) hours as needed (nausea/vomiting).    PANTOPRAZOLE (PROTONIX) 40 MG TABLET    Take 1 tablet (40 mg total) by mouth once daily.    POSACONAZOLE (NOXAFIL) 100 MG TBEC TABLET    Take 3 tablets (300 mg total) by mouth once  daily.    PROCHLORPERAZINE (COMPAZINE) 5 MG TABLET    Take 1 tablet (5 mg total) by mouth 4 (four) times daily as needed for Nausea.    ROPINIROLE (REQUIP) 0.5 MG TABLET    Take 1 tablet (0.5 mg total) by mouth every evening.    SULFAMETHOXAZOLE-TRIMETHOPRIM 800-160MG (BACTRIM DS) 800-160 MG TAB    Take 1 tablet by mouth every Mon, Wed, Fri. START 10/21/24    TACROLIMUS (PROGRAF) 0.5 MG CAP    Take 2 capsules (1 mg total) by mouth every morning AND 1 capsule (0.5 mg total) every evening.    TRAMADOL (ULTRAM) 50 MG TABLET    Take 1 tablet (50 mg total) by mouth every 6 (six) hours as needed for Pain.    ZOLPIDEM (AMBIEN) 5 MG TAB    Take 1 tablet (5 mg total) by mouth nightly as needed (insomnia).       ALLERGIES:   Review of patient's allergies indicates:  No Known Allergies    GVHD Review of Systems:     Pertinent positives and negatives included in the HPI. Otherwise a 14 point review of systems is negative. GVHD review of systems recorded in BMT flowsheet.     Physical Exam:     There were no vitals filed for this visit.        General: Appears well, NAD.   HEENT: MMM, no OP lesions  Pulmonary: CTAB, no increased work of breathing, no W/R/C  Cardiovascular: S1S2 normal, RRR, no M/R/G  Abdominal: Soft, NT, ND, BS+, no HSM  Extremities: No C/C/E  Neurological: AAOx4, grossly normal, no focal deficits  Dermatologic: No appreciable rashes or lesions    ECOG Performance Status: (foot note - ECOG PS provided by [employer]cology [employer]) 1 - Symptomatic but completely ambulatory    Karnofsky Performance Score:  80%- Normal Activity with Effort: Some Symptoms of Disease    Labs:   Lab Results   Component Value Date    WBC 1.43 (LL) 12/12/2024    HGB 10.6 (L) 12/12/2024    HCT 31.5 (L) 12/12/2024     (H) 12/12/2024    PLT 15 (LL) 12/12/2024        CMP  Sodium   Date Value Ref Range Status   12/12/2024 140 136 - 145 mmol/L Final     Potassium   Date Value Ref Range Status   12/12/2024 4.0 3.5 - 5.1 mmol/L  Final     Chloride   Date Value Ref Range Status   12/12/2024 107 95 - 110 mmol/L Final     CO2   Date Value Ref Range Status   12/12/2024 27 23 - 29 mmol/L Final     Glucose   Date Value Ref Range Status   12/12/2024 129 (H) 70 - 110 mg/dL Final     BUN   Date Value Ref Range Status   12/12/2024 20 8 - 23 mg/dL Final     Creatinine   Date Value Ref Range Status   12/12/2024 0.7 0.5 - 1.4 mg/dL Final     Calcium   Date Value Ref Range Status   12/12/2024 8.4 (L) 8.7 - 10.5 mg/dL Final     Total Protein   Date Value Ref Range Status   12/12/2024 4.9 (L) 6.0 - 8.4 g/dL Final     Albumin   Date Value Ref Range Status   12/12/2024 3.1 (L) 3.5 - 5.2 g/dL Final     Total Bilirubin   Date Value Ref Range Status   12/12/2024 0.4 0.1 - 1.0 mg/dL Final     Comment:     For infants and newborns, interpretation of results should be based  on gestational age, weight and in agreement with clinical  observations.    Premature Infant recommended reference ranges:  Up to 24 hours.............<8.0 mg/dL  Up to 48 hours............<12.0 mg/dL  3-5 days..................<15.0 mg/dL  6-29 days.................<15.0 mg/dL       Alkaline Phosphatase   Date Value Ref Range Status   12/12/2024 85 40 - 150 U/L Final     AST   Date Value Ref Range Status   12/12/2024 19 10 - 40 U/L Final     ALT   Date Value Ref Range Status   12/12/2024 42 10 - 44 U/L Final     Anion Gap   Date Value Ref Range Status   12/12/2024 6 (L) 8 - 16 mmol/L Final     eGFR   Date Value Ref Range Status   12/12/2024 >60.0 >60 mL/min/1.73 m^2 Final          Imaging:   Hospital imaging reviewed.    Pathology:  Prior pathology reviewed. Plan for day +30 bone marrow biopsy on 10/23/24    Acute GVHD Scoring:  GVHD Acute Assessment      No GVHD at this time. Unable to record via flowsheets.               Assessment and Plan:   Guillaume Betsy Brad (Guillaume) is a pleasant 69 y.o.male with a past medical history of MDS s/p haplo SCT who presents for post-transplant  follow up.    MDS: Status post treatment with azacitidine 75 mg/m2 daily x7 days plus venetoclax 100mg (voriconazole) daily x14 of 28 days.Venetoclax decreased to 7 days with cycle 3 until completion of 11 cycles. No plans for maintenance at this time.     Status post allogeneic stem cell transplantation: As noted above, status post haploidentical stem cell transplantation conditioned with FluMel 100 + PTCy+ 2Gy TBI . Currently Day+ 87. Engrafted on 10/09/24 day +20.  Day 30 bone marrow (11/5/2024) showing mildly hypercellular marrow with no increased blast (0.3%) and no evidence of myeloid neoplasm. Pending chimerisms, NGS, and CG  Day 100 bone marrow biopsy on 12/30/24  Will plan to remove his port soon   3.    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 Persistent nausea despite anti-emetics, reducing pill burden. 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 He started budesonide 3mg TID on 10/28/24. Due to persistent nausea despite budesonide so started systemic steroids 11/1 at 0.5 mg/kg and his steroid taper has been given to him. Steroid taper completed 12/8/24.  Current tacro dose: 2 capsules (1mg) in the morning and 1 capsules (0.5 mg) in the evening.   Last tacro level: 8.6   Adjustments: none    4.       Immunosuppression: Prevention with posaconazole, acyclovir. CMV prophylaxis with letermovir. PJP prophyalxis Bactrim MW. Continue weekly monitoring of CMV and EBV.  Last CMV: Not-detected, last EBV: Not-detected.   Active infections: N/A    Pancytopenia: Due to underlying disease and chemotherapy. Transfuseu for Hgb <7 g/dL and platelets <10k. Folate and copper deficient, on supplementation. Will continue to monitor to see if platelets begin to rise with his supplements. Promacta sent in on 12/9/24.    Folic Acid deficiency   Folic acid borderline low at 4.3, taking folic acid 1mg daily     Copper deficiency   Copper  level of 635, he is taking a daily OTC Copper supplement.     Hypomagnesemia: Related to tacro, poor po intake. 1.5 today.   Continue MagOx to 800mg twice daily.      Chemotherapy Induced Nausea: Resolved with steroids. Will taper budesonide. Patient has received his taper in [place].      Anxiety, Restless Leg Syndrome: Noted since discharge by family. He started ropinirole 0.5mg and has experienced significant improvement.    Insomnia: Patient with significant improvement. Still wakes up a couple of times during the night but feels well rested in the mornings. Continue Ambien and Ropinirole for RLS.     Follow up: Twice weekly follow up with labs.    Orders Placed:           Medical Complexity:   Visit today included increased complexity associated with the care of the episodic problems addressed above and managing the longitudinal care of the patient due to the serious and/or complex managed problem(s) history of allo SCT.      Follow Up:      Twice weekly follow up as scheduled.       A total of 30 minutes was spent in pre-visit chart review, personal interpretation of labs and imaging, and medication review. Total visit time 40 minutes, >50 % counseling.

## 2024-12-16 NOTE — NURSING
Patient seated in chair, Assessment done, PICC accessed, medial line, flushed, blood return noted, wasted, labs drawn, flushed, heparin locked & capped. Pt ambulated off floor accompanied by family member.

## 2024-12-18 NOTE — PROGRESS NOTES
Section of Hematology and Stem Cell Transplantation    Post-Transplantation Follow Up Visit     12/19/2024    Transplant History:   Primary oncologist: Paco Hickey MD  Primary oncologic diagnosis: MDS  Transplant date: 9/19/2024  Donor: haploidentical  Blood Type (Patient): B +  Blood Type (Donor): A +  CMV (Patient): Positive  CMV (Donor): Positive  Graft source: Bone marrow  CD34+ cell dose: 3.55x10^6  Conditioning Regimen: Fludarabine plus melphalan 100 mg/m2 + 2Gy TBI  GVHD prophylaxis: Post-transplant cyclophosphamide, Tacrolimus, MMF  Immediate post-transplant complications: Patient experienced expected GI toxicities with C diff negative diarrhea and nausea, neutropenic fever with negative infectious work up, expected cytopenias requiring transfusions, electrolyte abnormalities requiring replacement, volume overload requiring diuresis, intermittent SOB from pulmonary edema requiring 02, and a hemorrhoid flare up. Diarrhea and SOB improved towards the end of the hospital stay.     History of Present Ilness:   Guillaume Salinas (Guillaume) is a pleasant 69 y.o.male with a past medical history of MDS who is status post haploidentical stem cell transplantation conditioned with FluMel 100 + PTCy+ 2Gy TBI who is currently day +91  who presents for post-transplant follow up. Offered  through language services, declined .     Interval History:   Patient presents for routine follow-up. He reports doing well. His appetite is great with him eating three full meals daily with snacks without no nausea/emesis.  No rash. No pruritis. No diarrhea.  His restless leg syndrome and insomnia are better but he still wakes up throughout the night. He has not received promacta yet.    PAST MEDICAL HISTORY:   Past Medical History:   Diagnosis Date    Anticoagulant long-term use     Coronary artery disease     Hypertension     Myelodysplastic syndrome     Peripheral vascular disease, unspecified        PAST SURGICAL  HISTORY:   Past Surgical History:   Procedure Laterality Date    BONE MARROW BIOPSY Left 2023    Procedure: Biopsy-bone marrow;  Surgeon: Harry Diamond MD;  Location: [facility] OR;  Service: Oncology;  Laterality: Left;    COLONOSCOPY N/A 2022    Procedure: COLONOSCOPY Golytely Vaccinated will bring cards;  Surgeon: Dereje Simon MD;  Location: [facility] ENDO;  Service: Endoscopy;  Laterality: N/A;  Do not cancel this order    INSERTION OF LEMONS CATHETER Right 2024    Procedure: INSERTION, CATHETER, CENTRAL VENOUS, LEMONS TRIPLE LUMEN;  Surgeon: Kg Patten MD;  Location: [address];  Service: General;  Laterality: Right;     PAST SOCIAL HISTORY:  Social History     Socioeconomic History    Marital status:    Tobacco Use    Smoking status: Former     Current packs/day: 0.00     Average packs/day: 0.3 packs/day for 50.0 years (12.5 ttl pk-yrs)     Types: Cigarettes     Start date: 3/1/1973     Quit date: 3/1/2023     Years since quittin.8     Passive exposure: Past    Smokeless tobacco: Never   Substance and Sexual Activity    Alcohol use: Not Currently    Drug use: Never    Sexual activity: Not Currently     Partners: Female     Social Drivers of Health     Financial Resource Strain: Low Risk  (2024)    Overall Financial Resource Strain (CARDIA)     Difficulty of Paying Living Expenses: Not hard at all   Food Insecurity: No Food Insecurity (2024)    Hunger Vital Sign     Worried About Running Out of Food in the Last Year: Never true     Ran Out of Food in the Last Year: Never true   Transportation Needs: No Transportation Needs (2024)    TRANSPORTATION NEEDS     Transportation : No   Physical Activity: Insufficiently Active (2024)    Exercise Vital Sign     Days of Exercise per Week: 2 days     Minutes of Exercise per Session: 60 min   Stress: Stress Concern Present (2024)    [employer] [place] [employer] - Occupational Stress  Questionnaire     Feeling of Stress : To some extent   Housing Stability: Low Risk  (9/30/2024)    Housing Stability Vital Sign     Unable to Pay for Housing in the Last Year: No     Homeless in the Last Year: No       FAMILY HISTORY:  Cancer-related family history includes Cancer in his brother and father.    CURRENT MEDICATIONS:   Medication List with Changes/Refills   Current Medications    ACYCLOVIR (ZOVIRAX) 800 MG TAB    Take 1 tablet (800 mg total) by mouth 2 (two) times daily.    BUDESONIDE (ENTOCORT EC) 3 MG CAPSULE    Take 1 capsule (3 mg total) by mouth 2 (two) times a day for 5 days, THEN 1 capsule (3 mg total) once daily for 5 days.    CARVEDILOL (COREG) 6.25 MG TABLET    Take 1 tablet (6.25 mg total) by mouth 2 (two) times daily.    COPPER GLUCONATE 2 MG CAP    Take 2 mg by mouth once daily.    ELTROMBOPAG OLAMINE (PROMACTA) 50 MG TAB    Take 1 tablet (50 mg total) by mouth once daily.    FOLIC ACID (FOLVITE) 1 MG TABLET    Take 1 tablet (1 mg total) by mouth once daily.    GABAPENTIN (NEURONTIN) 300 MG CAPSULE    Take 2 capsules (600 mg total) by mouth every evening.    LETERMOVIR (PREVYMIS) 480 MG TAB    Take 1 tablet (480 mg total) by mouth Daily.    MAGNESIUM OXIDE (MAG-OX) 400 MG (241.3 MG MAGNESIUM) TABLET    Take 2 tablets (800 mg total) by mouth 2 (two) times daily.    PANTOPRAZOLE (PROTONIX) 40 MG TABLET    Take 1 tablet (40 mg total) by mouth once daily.    POSACONAZOLE (NOXAFIL) 100 MG TBEC TABLET    Take 3 tablets (300 mg total) by mouth once daily.    ROPINIROLE (REQUIP) 0.5 MG TABLET    Take 1 tablet (0.5 mg total) by mouth every evening.    SULFAMETHOXAZOLE-TRIMETHOPRIM 800-160MG (BACTRIM DS) 800-160 MG TAB    Take 1 tablet by mouth every Mon, Wed, Fri. START 10/21/24    TACROLIMUS (PROGRAF) 0.5 MG CAP    Take 2 capsules (1 mg total) by mouth every morning AND 1 capsule (0.5 mg total) every evening.    TRAMADOL (ULTRAM) 50 MG TABLET    Take 1 tablet (50 mg total) by mouth every 6 (six)  hours as needed for Pain.    ZOLPIDEM (AMBIEN) 5 MG TAB    Take 1 tablet (5 mg total) by mouth nightly as needed (insomnia).       ALLERGIES:   Review of patient's allergies indicates:  No Known Allergies    GVHD Review of Systems:     Pertinent positives and negatives included in the HPI. Otherwise a 14 point review of systems is negative. GVHD review of systems recorded in BMT flowsheet.     Physical Exam:     Vitals:    12/19/24 0827   BP: 116/60   Pulse: 73   Resp: 18   Temp: 98 °F (36.7 °C)       General: Appears well, NAD.   HEENT: MMM, no OP lesions  Pulmonary: CTAB, no increased work of breathing, no W/R/C  Cardiovascular: S1S2 normal, RRR, no M/R/G  Abdominal: Soft, NT, ND, BS+, no HSM  Extremities: No C/C/E  Neurological: AAOx4, grossly normal, no focal deficits  Dermatologic: No appreciable rashes or lesions    ECOG Performance Status: (foot note - ECOG PS provided by [employer] Cooperative Oncology Group) 1 - Symptomatic but completely ambulatory    Karnofsky Performance Score:  80%- Normal Activity with Effort: Some Symptoms of Disease    Labs:   Lab Results   Component Value Date    WBC 1.57 (LL) 12/19/2024    HGB 11.2 (L) 12/19/2024    HCT 32.1 (L) 12/19/2024     (H) 12/19/2024    PLT 15 (LL) 12/19/2024        CMP  Sodium   Date Value Ref Range Status   12/19/2024 139 136 - 145 mmol/L Final     Potassium   Date Value Ref Range Status   12/19/2024 4.1 3.5 - 5.1 mmol/L Final     Chloride   Date Value Ref Range Status   12/19/2024 106 95 - 110 mmol/L Final     CO2   Date Value Ref Range Status   12/19/2024 26 23 - 29 mmol/L Final     Glucose   Date Value Ref Range Status   12/19/2024 157 (H) 70 - 110 mg/dL Final     BUN   Date Value Ref Range Status   12/19/2024 21 8 - 23 mg/dL Final     Creatinine   Date Value Ref Range Status   12/19/2024 0.7 0.5 - 1.4 mg/dL Final     Calcium   Date Value Ref Range Status   12/19/2024 9.0 8.7 - 10.5 mg/dL Final     Total Protein   Date Value Ref Range Status    12/19/2024 5.6 (L) 6.0 - 8.4 g/dL Final     Albumin   Date Value Ref Range Status   12/19/2024 3.4 (L) 3.5 - 5.2 g/dL Final     Total Bilirubin   Date Value Ref Range Status   12/19/2024 0.4 0.1 - 1.0 mg/dL Final     Comment:     For infants and newborns, interpretation of results should be based  on gestational age, weight and in agreement with clinical  observations.    Premature Infant recommended reference ranges:  Up to 24 hours.............<8.0 mg/dL  Up to 48 hours............<12.0 mg/dL  3-5 days..................<15.0 mg/dL  6-29 days.................<15.0 mg/dL       Alkaline Phosphatase   Date Value Ref Range Status   12/19/2024 71 40 - 150 U/L Final     AST   Date Value Ref Range Status   12/19/2024 16 10 - 40 U/L Final     ALT   Date Value Ref Range Status   12/19/2024 35 10 - 44 U/L Final     Anion Gap   Date Value Ref Range Status   12/19/2024 7 (L) 8 - 16 mmol/L Final     eGFR   Date Value Ref Range Status   12/19/2024 >60.0 >60 mL/min/1.73 m^2 Final          Imaging:   Hospital imaging reviewed.    Pathology:  Prior pathology reviewed. Plan for day +30 bone marrow biopsy on 10/23/24    Acute GVHD Scoring:  GVHD Acute Assessment      No GVHD at this time.               Assessment and Plan:   Guillaume Salinas (Guillaume) is a pleasant 69 y.o.male with a past medical history of MDS s/p haplo SCT who presents for post-transplant follow up.    MDS: Status post treatment with azacitidine 75 mg/m2 daily x7 days plus venetoclax 100mg (voriconazole) daily x14 of 28 days.Venetoclax decreased to 7 days with cycle 3 until completion of 11 cycles. No plans for maintenance at this time.     Status post allogeneic stem cell transplantation: As noted above, status post haploidentical stem cell transplantation conditioned with FluMel 100 + PTCy+ 2Gy TBI . Currently Day+ 91. Engrafted on 10/09/24 day +20.  Day 30 bone marrow (11/5/2024) showing mildly hypercellular marrow with no increased blast (0.3%) and  no evidence of myeloid neoplasm. Pending chimerisms, NGS, and CG  Day 100 bone marrow biopsy on 12/31/24  Will plan to remove his port in January    3.    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 Persistent nausea despite anti-emetics, reducing pill burden. 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 He started budesonide 3mg TID on 10/28/24. Due to persistent nausea despite budesonide so started systemic steroids 11/1 at 0.5 mg/kg and his steroid taper has been given to him. Steroid taper completed 12/8/24.  Current tacro dose: 2 capsules (1mg) in the morning and 1 capsules (0.5 mg) in the evening.   Last tacro level: 10.5  Adjustments: None    4.       Immunosuppression: Prevention with posaconazole, acyclovir. CMV prophylaxis with letermovir. PJP prophyalxis Bactrim MWF. Continue weekly monitoring of CMV and EBV.  Last CMV: Not-detected, last EBV: Not-detected.   Active infections: N/A    Pancytopenia: Due to underlying disease and chemotherapy. Transfuseu for Hgb <7 g/dL and platelets <10k. Folate and copper deficient, on supplementation. Will continue to monitor to see if platelets begin to rise with his supplements. Promacta sent in on 12/9/24. Working on aditya for financial assistance for promacta.     Folic Acid deficiency   Folic acid borderline low at 4.3, taking folic acid 1mg daily     Copper deficiency   Copper level of 635, he is taking a daily OTC Copper supplement.     Hypomagnesemia: Related to tacro, poor po intake. 1.5 today.   Continue MagOx to 800mg twice daily.      Chemotherapy Induced Nausea: Resolved with steroids. Taperinf budesonide. Patient has his taper in English.      Anxiety, Restless Leg Syndrome: Noted since discharge by family. He started ropinirole 0.5mg and has experienced significant improvement.    Insomnia: Patient with significant improvement. Still wakes up a couple of times during the  night but feels well rested in the mornings. Continue Ambien and Ropinirole for RLS.     Follow up: Twice weekly follow up with labs.    Orders Placed:           Medical Complexity:   Visit today included increased complexity associated with the care of the episodic problems addressed above and managing the longitudinal care of the patient due to the serious and/or complex managed problem(s) history of allo SCT.      Follow Up:      Twice weekly follow up as scheduled.       A total of 30 minutes was spent in pre-visit chart review, personal interpretation of labs and imaging, and medication review. Total visit time 40 minutes, >50 % counseling.

## 2024-12-19 ENCOUNTER — TELEPHONE (OUTPATIENT)
Dept: HEMATOLOGY/ONCOLOGY | Facility: CLINIC | Age: 69
End: 2024-12-19
Payer: MEDICARE

## 2024-12-19 ENCOUNTER — OFFICE VISIT (OUTPATIENT)
Dept: HEMATOLOGY/ONCOLOGY | Facility: CLINIC | Age: 69
End: 2024-12-19
Payer: MEDICARE

## 2024-12-19 ENCOUNTER — INFUSION (OUTPATIENT)
Dept: INFUSION THERAPY | Facility: HOSPITAL | Age: 69
End: 2024-12-19
Payer: MEDICARE

## 2024-12-19 ENCOUNTER — DOCUMENTATION ONLY (OUTPATIENT)
Dept: HEMATOLOGY/ONCOLOGY | Facility: CLINIC | Age: 69
End: 2024-12-19
Payer: MEDICARE

## 2024-12-19 VITALS
OXYGEN SATURATION: 97 % | HEIGHT: 69 IN | WEIGHT: 141.75 LBS | RESPIRATION RATE: 18 BRPM | TEMPERATURE: 98 F | BODY MASS INDEX: 21 KG/M2 | DIASTOLIC BLOOD PRESSURE: 60 MMHG | SYSTOLIC BLOOD PRESSURE: 116 MMHG | HEART RATE: 73 BPM

## 2024-12-19 DIAGNOSIS — R11.0 CHEMOTHERAPY-INDUCED NAUSEA: ICD-10-CM

## 2024-12-19 DIAGNOSIS — D61.810 PANCYTOPENIA DUE TO ANTINEOPLASTIC CHEMOTHERAPY: ICD-10-CM

## 2024-12-19 DIAGNOSIS — E61.0 COPPER DEFICIENCY: ICD-10-CM

## 2024-12-19 DIAGNOSIS — E83.42 HYPOMAGNESEMIA: ICD-10-CM

## 2024-12-19 DIAGNOSIS — F41.9 ANXIETY: ICD-10-CM

## 2024-12-19 DIAGNOSIS — E53.8 FOLIC ACID DEFICIENCY: ICD-10-CM

## 2024-12-19 DIAGNOSIS — D49.9 IMMUNODEFICIENCY SECONDARY TO NEOPLASM: ICD-10-CM

## 2024-12-19 DIAGNOSIS — D84.81 IMMUNODEFICIENCY SECONDARY TO NEOPLASM: ICD-10-CM

## 2024-12-19 DIAGNOSIS — Z94.81 S/P ALLOGENEIC BONE MARROW TRANSPLANT: ICD-10-CM

## 2024-12-19 DIAGNOSIS — D46.9 MYELODYSPLASTIC SYNDROME: Primary | ICD-10-CM

## 2024-12-19 DIAGNOSIS — Z76.82 STEM CELL TRANSPLANT CANDIDATE: ICD-10-CM

## 2024-12-19 DIAGNOSIS — T45.1X5A PANCYTOPENIA DUE TO ANTINEOPLASTIC CHEMOTHERAPY: ICD-10-CM

## 2024-12-19 DIAGNOSIS — T45.1X5A CHEMOTHERAPY-INDUCED NAUSEA: ICD-10-CM

## 2024-12-19 DIAGNOSIS — D89.813 GVHD (GRAFT VERSUS HOST DISEASE): ICD-10-CM

## 2024-12-19 DIAGNOSIS — G47.00 INSOMNIA, UNSPECIFIED TYPE: ICD-10-CM

## 2024-12-19 LAB
ABO + RH BLD: NORMAL
ALBUMIN SERPL BCP-MCNC: 3.4 G/DL (ref 3.5–5.2)
ALP SERPL-CCNC: 71 U/L (ref 40–150)
ALT SERPL W/O P-5'-P-CCNC: 35 U/L (ref 10–44)
ANION GAP SERPL CALC-SCNC: 7 MMOL/L (ref 8–16)
AST SERPL-CCNC: 16 U/L (ref 10–40)
BASOPHILS # BLD AUTO: 0 K/UL (ref 0–0.2)
BASOPHILS NFR BLD: 0 % (ref 0–1.9)
BILIRUB SERPL-MCNC: 0.4 MG/DL (ref 0.1–1)
BLD GP AB SCN CELLS X3 SERPL QL: NORMAL
BUN SERPL-MCNC: 21 MG/DL (ref 8–23)
CALCIUM SERPL-MCNC: 9 MG/DL (ref 8.7–10.5)
CHLORIDE SERPL-SCNC: 106 MMOL/L (ref 95–110)
CMV DNA SPEC QL NAA+PROBE: NORMAL
CO2 SERPL-SCNC: 26 MMOL/L (ref 23–29)
CREAT SERPL-MCNC: 0.7 MG/DL (ref 0.5–1.4)
CYTOMEGALOVIRUS PCR, QUANT: NOT DETECTED IU/ML
DIFFERENTIAL METHOD BLD: ABNORMAL
EOSINOPHIL # BLD AUTO: 0 K/UL (ref 0–0.5)
EOSINOPHIL NFR BLD: 0 % (ref 0–8)
EPSTEIN-BARR VIRUS DNA: ABNORMAL
EPSTEIN-BARR VIRUS LOG (IU/ML): 2.41 LOGIU/ML
EPSTEIN-BARR VIRUS PCR, QUANT: 259 IU/ML
ERYTHROCYTE [DISTWIDTH] IN BLOOD BY AUTOMATED COUNT: 18.8 % (ref 11.5–14.5)
EST. GFR  (NO RACE VARIABLE): >60 ML/MIN/1.73 M^2
GLUCOSE SERPL-MCNC: 157 MG/DL (ref 70–110)
HCT VFR BLD AUTO: 32.1 % (ref 40–54)
HGB BLD-MCNC: 11.2 G/DL (ref 14–18)
IMM GRANULOCYTES # BLD AUTO: 0.01 K/UL (ref 0–0.04)
IMM GRANULOCYTES NFR BLD AUTO: 0.6 % (ref 0–0.5)
LYMPHOCYTES # BLD AUTO: 0.3 K/UL (ref 1–4.8)
LYMPHOCYTES NFR BLD: 16.6 % (ref 18–48)
MAGNESIUM SERPL-MCNC: 1.7 MG/DL (ref 1.6–2.6)
MCH RBC QN AUTO: 39.2 PG (ref 27–31)
MCHC RBC AUTO-ENTMCNC: 34.9 G/DL (ref 32–36)
MCV RBC AUTO: 112 FL (ref 82–98)
MONOCYTES # BLD AUTO: 0.1 K/UL (ref 0.3–1)
MONOCYTES NFR BLD: 8.9 % (ref 4–15)
NEUTROPHILS # BLD AUTO: 1.2 K/UL (ref 1.8–7.7)
NEUTROPHILS NFR BLD: 73.9 % (ref 38–73)
NRBC BLD-RTO: 0 /100 WBC
PHOSPHATE SERPL-MCNC: 3.2 MG/DL (ref 2.7–4.5)
PLATELET # BLD AUTO: 15 K/UL (ref 150–450)
PMV BLD AUTO: ABNORMAL FL (ref 9.2–12.9)
POTASSIUM SERPL-SCNC: 4.1 MMOL/L (ref 3.5–5.1)
PROT SERPL-MCNC: 5.6 G/DL (ref 6–8.4)
RBC # BLD AUTO: 2.86 M/UL (ref 4.6–6.2)
SODIUM SERPL-SCNC: 139 MMOL/L (ref 136–145)
SPECIMEN OUTDATE: NORMAL
TACROLIMUS BLD-MCNC: 10.5 NG/ML (ref 5–15)
WBC # BLD AUTO: 1.57 K/UL (ref 3.9–12.7)

## 2024-12-19 PROCEDURE — 86850 RBC ANTIBODY SCREEN: CPT | Performed by: INTERNAL MEDICINE

## 2024-12-19 PROCEDURE — 63600175 PHARM REV CODE 636 W HCPCS: Performed by: INTERNAL MEDICINE

## 2024-12-19 PROCEDURE — 84100 ASSAY OF PHOSPHORUS: CPT | Performed by: INTERNAL MEDICINE

## 2024-12-19 PROCEDURE — A4216 STERILE WATER/SALINE, 10 ML: HCPCS | Performed by: INTERNAL MEDICINE

## 2024-12-19 PROCEDURE — 25000003 PHARM REV CODE 250: Performed by: INTERNAL MEDICINE

## 2024-12-19 PROCEDURE — 36592 COLLECT BLOOD FROM PICC: CPT

## 2024-12-19 PROCEDURE — 80053 COMPREHEN METABOLIC PANEL: CPT | Performed by: INTERNAL MEDICINE

## 2024-12-19 PROCEDURE — 80197 ASSAY OF TACROLIMUS: CPT | Performed by: INTERNAL MEDICINE

## 2024-12-19 PROCEDURE — 83735 ASSAY OF MAGNESIUM: CPT | Performed by: INTERNAL MEDICINE

## 2024-12-19 PROCEDURE — 87799 DETECT AGENT NOS DNA QUANT: CPT | Performed by: INTERNAL MEDICINE

## 2024-12-19 PROCEDURE — 85025 COMPLETE CBC W/AUTO DIFF WBC: CPT | Performed by: INTERNAL MEDICINE

## 2024-12-19 PROCEDURE — 99999 PR PBB SHADOW E&M-EST. PATIENT-LVL IV: CPT | Mod: PBBFAC,,,

## 2024-12-19 RX ORDER — SODIUM CHLORIDE 0.9 % (FLUSH) 0.9 %
10 SYRINGE (ML) INJECTION
Status: DISCONTINUED | OUTPATIENT
Start: 2024-12-19 | End: 2024-12-19 | Stop reason: HOSPADM

## 2024-12-19 RX ORDER — SODIUM CHLORIDE 0.9 % (FLUSH) 0.9 %
10 SYRINGE (ML) INJECTION
OUTPATIENT
Start: 2024-12-19

## 2024-12-19 RX ORDER — HEPARIN 100 UNIT/ML
500 SYRINGE INTRAVENOUS
OUTPATIENT
Start: 2024-12-19

## 2024-12-19 RX ORDER — HEPARIN 100 UNIT/ML
500 SYRINGE INTRAVENOUS
Status: DISCONTINUED | OUTPATIENT
Start: 2024-12-19 | End: 2024-12-19 | Stop reason: HOSPADM

## 2024-12-19 RX ADMIN — SODIUM CHLORIDE, PRESERVATIVE FREE 10 ML: 5 INJECTION INTRAVENOUS at 08:12

## 2024-12-19 RX ADMIN — HEPARIN SODIUM (PORCINE) LOCK FLUSH IV SOLN 100 UNIT/ML 500 UNITS: 100 SOLUTION at 08:12

## 2024-12-19 NOTE — PROGRESS NOTES
BMT Pharmacist Immunosuppression Note:    Reviewed patient's tacrolimus level with Dr. Hickey and it is within goal range. Instructed patient to continue your current regimen of 2 capsules (1mg) in the morning and 1 capsules (0.5mg) in the evening.       Sanjuana Poe Pharm.D., [facility]  Clinical Pharmacy Specialist, Bone Marrow Transplant/Cellular Therapy  [facility]  SpectraLink: [phone]

## 2024-12-19 NOTE — PLAN OF CARE
[facility]   Physical Therapy Initial Evaluation      Name: Guillaume Salinas  Clinic Number: [id number]    Therapy Diagnosis:   Encounter Diagnoses   Name Primary?    S/P allogeneic bone marrow transplant     Decreased strength, endurance, and mobility Yes        Physician: Mohit Hiceky MD    Physician Orders: PT Eval and Treat   Medical Diagnosis from Referral: Z94.81 (ICD-10-CM) - S/P allogeneic bone marrow transplant   Evaluation Date: 12/20/2024  Authorization Period Expiration: 12/09/2025   Plan of Care Expiration: 3/20/25  Progress Note Due: 1/20/25  Date of Surgery: 9/19/14  Visit # / Visits authorized: 1/ 1   FOTO: 1/ 3    Precautions: Standard and HTN,Myelodysplastic syndrome, PVD, CAD, chemotherapy       Time In: 0805a  Time Out: 0904a  Total Billable Time: 59 minutes    Subjective     Date of onset: >1 year    History of current condition - Guillaume reports: From the last time that he was here until now he had a bone marrow transplant. He was in the hospital for about a month. He was doing PT at home and he finished and now they want him to come to outpatient PT. He would walk and do arm exercises and leg exercises at home. He denies use of walking with a cane or walker. He did walk with one after the transplant and then he was able to walk by himself. He does walk very carefully. He will take his time and is a little more aware so he does not fall. He has the most challenge with bending forward to pick something up, standing up, reaching above his head to grab things. He does not know for how long he can walk. During his stay in the hospital he would mainly lay on his Left side and he started to have some pain on that side. He would wear some lidocaine patches but he doesn't anymore. His family member who was present during initial evaluation also mentioned that prior to his bone marrow transplant he also had orders for cardiac rehab due to blockage in bilateral  "lower extremity. They reported 100% blockage on Right and 75% on Left. The plan is to eventually follow up with cardiac rehab as well     Falls: none    Imaging: X-ray chest AP portable 10/11/24: Impression:     Elevated left hemidiaphragm and  airspace consolidation at the left lung base and probably pleural effusion.      ith weakness  Electronically signed by:Justyn Felipe MD    Prior Therapy: PT for one month  Social History: lives with wife in single story home   Occupation: retired  Prior Level of Function: independent  Current Level of Function: independent    Pain:  Current 0/10, worst 10/10, best 0/10   Location: bilateral legs  Description: weakness,   Aggravating Factors: sitting for prolonged periods of time and then trying to move  Easing Factors: moving around and walking    Patients goals: "I want to not feel pain in my legs and I want to be able to walk and have my normal life" "I want to mow the lawn"     Medical History:   Past Medical History:   Diagnosis Date    Anticoagulant long-term use     Coronary artery disease     Hypertension     Myelodysplastic syndrome     Peripheral vascular disease, unspecified        Surgical History:   Guillaume Salinas  has a past surgical history that includes Colonoscopy (N/A, 01/05/2022); Bone marrow biopsy (Left, 4/26/2023); and Insertion of Hurd catheter (Right, 9/13/2024).    Medications:   Guillaume has a current medication list which includes the following prescription(s): acyclovir, budesonide, carvedilol, copper gluconate, eltrombopag olamine, folic acid, gabapentin, letermovir, magnesium oxide, pantoprazole, posaconazole, ropinirole, sulfamethoxazole-trimethoprim 800-160mg, tacrolimus, tramadol, and zolpidem.    Allergies:   Review of patient's allergies indicates:  No Known Allergies     Objective      Postural examination/scapula alignment: Rounded shoulder    Observation:  central line over Right chest    Upper extremity strength: "     STRENGTH LEFT RIGHT   Flexion 4-/5 pain in shoulder 5/5   Abduction 5/5 5/5   IR (neutral) 4+/5 4+/5   ER (neutral) 4-/5 4+/5         Lower Extremity Strength  Right LE  Left LE    Hip flexion: 4/5 Hip flexion: 4/5   Hip abduction: 4+/5 Hip abduction: 4+/5   Hip adduction:  4/5 Hip adduction:  4/5   Hip Internal rotation   4-/5 Hip Internal rotation 4-/5   Knee Flexion 4+/5 Knee Flexion 4+/5   Knee Extension 5/5 Knee Extension 5/5   Ankle dorsiflexion: 4+/5 Ankle dorsiflexion: 4+/5   Ankle plantarflexion: 4+/5 Ankle plantarflexion: 4+/5       GAIT:  Assistive Device used: none  Level of Assistance: independent  Patient displays the following gait deviations:  no gait deviations observed.     Bed Mobility:Independent  Transfers: Independent    Bending forward to  a pen from floor: little difficulty     Evaluation   30 second Chair Rise 13 seconds with use of hands   Tandem stand  > 20 seconds bilaterally   6 Minute Walk Test 1240 ft   Pre 6 Minute walk test: 98 SPO2% and 81 HR   After 6 Minute walk test SPO2 100% and 90 HR    Intake Outcome Measure for FOTO NOC-MSK Survey    Therapist reviewed FOTO scores for Guillaume Salinas on 12/20/2024.   FOTO report - see Media section or FOTO account episode details.    Intake Score: 46%         Treatment     Total Treatment time (time-based codes) separate from Evaluation: 10 minutes     Guillaume received the treatments listed below:        therapeutic activities to improve functional performance for 10  minutes, including:  Bridges 2x10  Side lying clams Red theraband 2x10 ea  Sit to stand x10  Patient/family education     Patient Education and Home Exercises     Education provided:   - Home exercise program   - Plan of Care  - assessment findings    Written Home Exercises Provided: Yes. Exercises were reviewed and Guillaume was able to demonstrate them prior to the end of the session.  Guillaume demonstrated good  understanding of the education provided. See EMR  under Patient Instructions for exercises provided during therapy sessions.    Assessment     Guillaume is a 69 y.o. male referred to outpatient Physical Therapy with a medical diagnosis of S/P allogeneic bone marrow transplant . Patient presents with decrease in strength, endurance, and self perceived physical function. He would benefit from skilled PT to address deficits to improve quality of life.     Patient prognosis is Good.   Patient will benefit from skilled outpatient Physical Therapy to address the deficits stated above and in the chart below, provide patient /family education, and to maximize patientt's level of independence.     Plan of care discussed with patient: Yes  Patient's spiritual, cultural and educational needs considered and patient is agreeable to the plan of care and goals as stated below:     Anticipated Barriers for therapy: history of cancer, bone marrow transplant, schedule due to other doctor's visits.     Medical Necessity is demonstrated by the following  History  Co-morbidities and personal factors that may impact the plan of care [] LOW: no personal factors / co-morbidities  [] MODERATE: 1-2 personal factors / co-morbidities  [x] HIGH: 3+ personal factors / co-morbidities    Moderate / High Support Documentation:   Co-morbidities affecting plan of care:   Anticoagulant long-term use   Coronary artery disease   Hypertension   Myelodysplastic syndrome   Peripheral vascular disease, unspecified       Personal Factors:   age     Examination  Body Structures and Functions, activity limitations and participation restrictions that may impact the plan of care [] LOW: addressing 1-2 elements  [x] MODERATE: 3+ elements  [] HIGH: 4+ elements (please support below)    Moderate / High Support Documentation: blood transfusions, schedule, immunocompromised     Clinical Presentation [] LOW: stable  [x] MODERATE: Evolving  [] HIGH: Unstable     Decision Making/ Complexity Score: moderate        Goals:  Short Term Goals: 6 weeks   Patient will be independent with Home exercise program to supplement therapy progressing, not met   Patient will be able to ambulate 1300 ft with 6 Minute walk test to improve endurance for community mobility progressing, not met  Patient will be able to lift and carry groceries without difficulty to improve ability to perform functional tasks progressing, not met    Long Term Goals: 12 weeks   Patient will be able to ambulate 1400 ft or more with 6 Minute walk test to improve endurance for community mobility progressing, not met  Patient will be able to perform 16 sit to stand on 30 second sit to  order to improve functional strength and endurance for transfers progressing, not met  Patient will improve FOTO score to 65 % to improve self perceived ability to perform functional tasks progressing, not met  Patient goal: Patient will be able to mow his lawn to return to prior level of function progressing, not met  Plan     Plan of care Certification: 12/20/2024 to 3/20/25.    Outpatient Physical Therapy 2 times weekly for 12 weeks to include the following interventions: Gait Training, Manual Therapy, Moist Heat/ Ice, Neuromuscular Re-ed, Patient Education, Therapeutic Activities, and Therapeutic Exercise.     Elvira Joyce, PT ,DPT,OCS  12/20/2024          Physician's Signature: _________________________________________ Date: ________________

## 2024-12-20 ENCOUNTER — CLINICAL SUPPORT (OUTPATIENT)
Dept: REHABILITATION | Facility: HOSPITAL | Age: 69
End: 2024-12-20
Attending: INTERNAL MEDICINE
Payer: MEDICARE

## 2024-12-20 DIAGNOSIS — Z94.81 S/P ALLOGENEIC BONE MARROW TRANSPLANT: ICD-10-CM

## 2024-12-20 DIAGNOSIS — R53.1 DECREASED STRENGTH, ENDURANCE, AND MOBILITY: Primary | ICD-10-CM

## 2024-12-20 DIAGNOSIS — Z74.09 DECREASED STRENGTH, ENDURANCE, AND MOBILITY: Primary | ICD-10-CM

## 2024-12-20 DIAGNOSIS — R68.89 DECREASED STRENGTH, ENDURANCE, AND MOBILITY: Primary | ICD-10-CM

## 2024-12-20 PROCEDURE — 97530 THERAPEUTIC ACTIVITIES: CPT | Mod: PN

## 2024-12-20 PROCEDURE — 97162 PT EVAL MOD COMPLEX 30 MIN: CPT | Mod: PN

## 2024-12-23 ENCOUNTER — PATIENT MESSAGE (OUTPATIENT)
Dept: HEMATOLOGY/ONCOLOGY | Facility: CLINIC | Age: 69
End: 2024-12-23

## 2024-12-23 ENCOUNTER — INFUSION (OUTPATIENT)
Dept: INFUSION THERAPY | Facility: HOSPITAL | Age: 69
End: 2024-12-23
Payer: MEDICARE

## 2024-12-23 ENCOUNTER — CLINICAL SUPPORT (OUTPATIENT)
Dept: HEMATOLOGY/ONCOLOGY | Facility: CLINIC | Age: 69
End: 2024-12-23
Payer: MEDICARE

## 2024-12-23 ENCOUNTER — OFFICE VISIT (OUTPATIENT)
Dept: HEMATOLOGY/ONCOLOGY | Facility: CLINIC | Age: 69
End: 2024-12-23
Payer: MEDICARE

## 2024-12-23 VITALS
DIASTOLIC BLOOD PRESSURE: 67 MMHG | HEIGHT: 69 IN | WEIGHT: 140.13 LBS | SYSTOLIC BLOOD PRESSURE: 111 MMHG | HEART RATE: 89 BPM | BODY MASS INDEX: 20.75 KG/M2 | OXYGEN SATURATION: 98 % | TEMPERATURE: 99 F

## 2024-12-23 DIAGNOSIS — D46.9 MYELODYSPLASTIC SYNDROME: ICD-10-CM

## 2024-12-23 DIAGNOSIS — D49.9 IMMUNODEFICIENCY SECONDARY TO NEOPLASM: ICD-10-CM

## 2024-12-23 DIAGNOSIS — D46.9 MYELODYSPLASTIC SYNDROME: Primary | ICD-10-CM

## 2024-12-23 DIAGNOSIS — Z94.81 S/P ALLOGENEIC BONE MARROW TRANSPLANT: Primary | ICD-10-CM

## 2024-12-23 DIAGNOSIS — Z76.82 STEM CELL TRANSPLANT CANDIDATE: ICD-10-CM

## 2024-12-23 DIAGNOSIS — D61.810 PANCYTOPENIA DUE TO ANTINEOPLASTIC CHEMOTHERAPY: ICD-10-CM

## 2024-12-23 DIAGNOSIS — T45.1X5A PANCYTOPENIA DUE TO ANTINEOPLASTIC CHEMOTHERAPY: ICD-10-CM

## 2024-12-23 DIAGNOSIS — G47.00 INSOMNIA, UNSPECIFIED TYPE: ICD-10-CM

## 2024-12-23 DIAGNOSIS — B27.00 EPSTEIN-BARR VIRUS VIREMIA: Primary | ICD-10-CM

## 2024-12-23 DIAGNOSIS — D47.Z1 PTLD (POST-TRANSPLANT LYMPHOPROLIFERATIVE DISORDER): ICD-10-CM

## 2024-12-23 DIAGNOSIS — T45.1X5A CHEMOTHERAPY-INDUCED NAUSEA: ICD-10-CM

## 2024-12-23 DIAGNOSIS — D84.81 IMMUNODEFICIENCY SECONDARY TO NEOPLASM: ICD-10-CM

## 2024-12-23 DIAGNOSIS — R11.0 CHEMOTHERAPY-INDUCED NAUSEA: ICD-10-CM

## 2024-12-23 DIAGNOSIS — E61.0 COPPER DEFICIENCY: ICD-10-CM

## 2024-12-23 DIAGNOSIS — G25.81 RESTLESS LEG SYNDROME: ICD-10-CM

## 2024-12-23 DIAGNOSIS — E53.8 FOLIC ACID DEFICIENCY: ICD-10-CM

## 2024-12-23 LAB
ABO + RH BLD: NORMAL
ALBUMIN SERPL BCP-MCNC: 3.3 G/DL (ref 3.5–5.2)
ALP SERPL-CCNC: 71 U/L (ref 40–150)
ALT SERPL W/O P-5'-P-CCNC: 36 U/L (ref 10–44)
ANION GAP SERPL CALC-SCNC: 10 MMOL/L (ref 8–16)
ANISOCYTOSIS BLD QL SMEAR: SLIGHT
AST SERPL-CCNC: 20 U/L (ref 10–40)
BASOPHILS # BLD AUTO: 0 K/UL (ref 0–0.2)
BASOPHILS NFR BLD: 0 % (ref 0–1.9)
BILIRUB SERPL-MCNC: 0.6 MG/DL (ref 0.1–1)
BLD GP AB SCN CELLS X3 SERPL QL: NORMAL
BUN SERPL-MCNC: 17 MG/DL (ref 8–23)
CALCIUM SERPL-MCNC: 9 MG/DL (ref 8.7–10.5)
CHLORIDE SERPL-SCNC: 103 MMOL/L (ref 95–110)
CMV DNA SPEC QL NAA+PROBE: NORMAL
CO2 SERPL-SCNC: 25 MMOL/L (ref 23–29)
CREAT SERPL-MCNC: 0.8 MG/DL (ref 0.5–1.4)
CYTOMEGALOVIRUS PCR, QUANT: NOT DETECTED IU/ML
DIFFERENTIAL METHOD BLD: ABNORMAL
DOHLE BOD BLD QL SMEAR: PRESENT
EOSINOPHIL # BLD AUTO: 0 K/UL (ref 0–0.5)
EOSINOPHIL NFR BLD: 0.7 % (ref 0–8)
EPSTEIN-BARR VIRUS DNA: ABNORMAL
EPSTEIN-BARR VIRUS LOG (IU/ML): 3.67 LOGIU/ML
EPSTEIN-BARR VIRUS PCR, QUANT: 4665 IU/ML
ERYTHROCYTE [DISTWIDTH] IN BLOOD BY AUTOMATED COUNT: 17.4 % (ref 11.5–14.5)
EST. GFR  (NO RACE VARIABLE): >60 ML/MIN/1.73 M^2
GLUCOSE SERPL-MCNC: 141 MG/DL (ref 70–110)
HCT VFR BLD AUTO: 32.9 % (ref 40–54)
HGB BLD-MCNC: 11.3 G/DL (ref 14–18)
IMM GRANULOCYTES # BLD AUTO: 0.01 K/UL (ref 0–0.04)
IMM GRANULOCYTES NFR BLD AUTO: 0.7 % (ref 0–0.5)
LYMPHOCYTES # BLD AUTO: 0.3 K/UL (ref 1–4.8)
LYMPHOCYTES NFR BLD: 18.3 % (ref 18–48)
MAGNESIUM SERPL-MCNC: 1.6 MG/DL (ref 1.6–2.6)
MCH RBC QN AUTO: 38 PG (ref 27–31)
MCHC RBC AUTO-ENTMCNC: 34.3 G/DL (ref 32–36)
MCV RBC AUTO: 111 FL (ref 82–98)
MONOCYTES # BLD AUTO: 0.2 K/UL (ref 0.3–1)
MONOCYTES NFR BLD: 11.8 % (ref 4–15)
NEUTROPHILS # BLD AUTO: 1.1 K/UL (ref 1.8–7.7)
NEUTROPHILS NFR BLD: 68.5 % (ref 38–73)
NRBC BLD-RTO: 0 /100 WBC
PHOSPHATE SERPL-MCNC: 3.9 MG/DL (ref 2.7–4.5)
PLATELET # BLD AUTO: 19 K/UL (ref 150–450)
PLATELET BLD QL SMEAR: ABNORMAL
PMV BLD AUTO: ABNORMAL FL (ref 9.2–12.9)
POLYCHROMASIA BLD QL SMEAR: ABNORMAL
POTASSIUM SERPL-SCNC: 4.3 MMOL/L (ref 3.5–5.1)
PROT SERPL-MCNC: 5.6 G/DL (ref 6–8.4)
RBC # BLD AUTO: 2.97 M/UL (ref 4.6–6.2)
SODIUM SERPL-SCNC: 138 MMOL/L (ref 136–145)
SPECIMEN OUTDATE: NORMAL
TACROLIMUS BLD-MCNC: 16.2 NG/ML (ref 5–15)
TOXIC GRANULES BLD QL SMEAR: PRESENT
WBC # BLD AUTO: 1.53 K/UL (ref 3.9–12.7)

## 2024-12-23 PROCEDURE — 84100 ASSAY OF PHOSPHORUS: CPT | Performed by: INTERNAL MEDICINE

## 2024-12-23 PROCEDURE — 25000003 PHARM REV CODE 250: Performed by: INTERNAL MEDICINE

## 2024-12-23 PROCEDURE — 83735 ASSAY OF MAGNESIUM: CPT | Performed by: INTERNAL MEDICINE

## 2024-12-23 PROCEDURE — 85025 COMPLETE CBC W/AUTO DIFF WBC: CPT | Performed by: INTERNAL MEDICINE

## 2024-12-23 PROCEDURE — 36592 COLLECT BLOOD FROM PICC: CPT

## 2024-12-23 PROCEDURE — A4216 STERILE WATER/SALINE, 10 ML: HCPCS | Performed by: INTERNAL MEDICINE

## 2024-12-23 PROCEDURE — 87799 DETECT AGENT NOS DNA QUANT: CPT | Performed by: INTERNAL MEDICINE

## 2024-12-23 PROCEDURE — 80197 ASSAY OF TACROLIMUS: CPT | Performed by: INTERNAL MEDICINE

## 2024-12-23 PROCEDURE — 99999 PR PBB SHADOW E&M-EST. PATIENT-LVL II: CPT | Mod: PBBFAC,,,

## 2024-12-23 PROCEDURE — 86901 BLOOD TYPING SEROLOGIC RH(D): CPT | Performed by: INTERNAL MEDICINE

## 2024-12-23 PROCEDURE — 99999 PR PBB SHADOW E&M-EST. PATIENT-LVL IV: CPT | Mod: PBBFAC,,, | Performed by: PHYSICIAN ASSISTANT

## 2024-12-23 PROCEDURE — 63600175 PHARM REV CODE 636 W HCPCS: Performed by: INTERNAL MEDICINE

## 2024-12-23 PROCEDURE — 80053 COMPREHEN METABOLIC PANEL: CPT | Performed by: INTERNAL MEDICINE

## 2024-12-23 RX ORDER — ACYCLOVIR 800 MG/1
800 TABLET ORAL 2 TIMES DAILY
Qty: 60 TABLET | Refills: 11 | Status: ON HOLD | OUTPATIENT
Start: 2024-12-23 | End: 2025-12-23

## 2024-12-23 RX ORDER — TACROLIMUS 0.5 MG/1
CAPSULE ORAL
Qty: 60 CAPSULE | Refills: 11 | Status: SHIPPED | OUTPATIENT
Start: 2024-12-23 | End: 2024-12-26

## 2024-12-23 RX ORDER — SULFAMETHOXAZOLE AND TRIMETHOPRIM 800; 160 MG/1; MG/1
1 TABLET ORAL
Qty: 12 TABLET | Refills: 11 | Status: SHIPPED | OUTPATIENT
Start: 2024-12-23 | End: 2024-12-26

## 2024-12-23 RX ORDER — HEPARIN 100 UNIT/ML
500 SYRINGE INTRAVENOUS
Status: DISCONTINUED | OUTPATIENT
Start: 2024-12-23 | End: 2024-12-23 | Stop reason: HOSPADM

## 2024-12-23 RX ORDER — SODIUM CHLORIDE 0.9 % (FLUSH) 0.9 %
10 SYRINGE (ML) INJECTION
Status: CANCELLED | OUTPATIENT
Start: 2024-12-23

## 2024-12-23 RX ORDER — ZOLPIDEM TARTRATE 5 MG/1
5 TABLET ORAL NIGHTLY PRN
Qty: 30 TABLET | Refills: 0 | Status: ON HOLD | OUTPATIENT
Start: 2024-12-23

## 2024-12-23 RX ORDER — HEPARIN 100 UNIT/ML
500 SYRINGE INTRAVENOUS
Status: CANCELLED | OUTPATIENT
Start: 2024-12-23

## 2024-12-23 RX ORDER — SODIUM CHLORIDE 0.9 % (FLUSH) 0.9 %
10 SYRINGE (ML) INJECTION
Status: DISCONTINUED | OUTPATIENT
Start: 2024-12-23 | End: 2024-12-23 | Stop reason: HOSPADM

## 2024-12-23 RX ADMIN — Medication 10 ML: at 08:12

## 2024-12-23 RX ADMIN — HEPARIN SODIUM (PORCINE) LOCK FLUSH IV SOLN 100 UNIT/ML 500 UNITS: 100 SOLUTION at 08:12

## 2024-12-23 NOTE — PROGRESS NOTES
BMT Pharmacist Medication Review Note     All current medications were reviewed with the patient and his caregiver. The patient brought in all of his medications with him to this visit.      We discussed the changes made since last meeting:   - Budesonide taper completed.    - Patient and his caregiver feel comfortable self-filling pill boxes. Will meet every other week/as needed. Patient aware that pharmacy is available during his appointments should any medication related questions arise.     The patient has ample supply of all medications except for Letermovir and Promacta.  - Promacta PAP approved, but patient has not received yet.   - Letermovir PAP re-enrollment completed, but patient only  has 1 week's worth of medication left.   - Will follow-up with SW to see if these shipments can be expedited.        Medicamentos/Medications Indicación/Indication Mañana/Morning Tarde/Afternoon Noche/Night   Acyclovir 800mg  Prevención de infecciones virales  Viral infection prevention 1 tableta  1 tableta   Letermovir (Prevymis®) 480mg Prevención de infección por CMV  CMV infection prevention 1 tableta     Posaconazole 100mg Prevencón de infecciones fungales  Fungal infection prevention 3 tabletas     Sulfamethoxazole-trimethoprim (Bactrim®) 800-160mg Fungal pneumonia prevention 1 tableta los lunes, miércoles y viernes (Mon., Wed., Fri)      **Tacrolimus 0.5mg Prevención de GVHD - NO tome la dosis de por la mañana en días que se relizará laboratorios  2 cápsula  1 cápsulas   Eltrombopag (Promacta) 50 mg plaquetas 1 tableta     Magnesium oxide 400mg Suplemento de magnesio 2 tabletas  2 tabletas      Ropinirole (Requip) 0.5 mg pierna inquieta   1 tableta   Carvedilol (Coreg®) 6.25 mg Presión arterial maeve  1 tableta  1 tableta   Gabapentin (Neurontin®) 300 mg Neuropatía   2 cápsulas   Copper Gluconate 2 mg Suplemento 1 cápsula     Folic Acid 1 mg Suplemento 1 tableta     **medicamento nuevo o cambios realizados al  medicamento  MEDICAMENTOS CUANDO VI NECESARIOS/AS NEEDED MEDICATIONS:                                                                    Zolpidem (Ambien) 5 mg tableta todas las noches según sea necesario para dormir     ya no stephany:                                                              Hold until told to restart: Cilostazol; Pantoprazole  Budesonide          All questions were answered.      Halima Thomas, PharmD  Clinical Pharmacist-BMT/Hematology  [facility]

## 2024-12-23 NOTE — PROGRESS NOTES
Section of Hematology and Stem Cell Transplantation    Post-Transplantation Follow Up Visit     12/23/2024    Transplant History:   Primary oncologist: Paco Hickey MD  Primary oncologic diagnosis: MDS  Transplant date: 9/19/2024  Donor: haploidentical  Blood Type (Patient): B +  Blood Type (Donor): A +  CMV (Patient): Positive  CMV (Donor): Positive  Graft source: Bone marrow  CD34+ cell dose: 3.55x10^6  Conditioning Regimen: Fludarabine plus melphalan 100 mg/m2 + 2Gy TBI  GVHD prophylaxis: Post-transplant cyclophosphamide, Tacrolimus, MMF  Immediate post-transplant complications: Patient experienced expected GI toxicities with C diff negative diarrhea and nausea, neutropenic fever with negative infectious work up, expected cytopenias requiring transfusions, electrolyte abnormalities requiring replacement, volume overload requiring diuresis, intermittent SOB from pulmonary edema requiring 02, and a hemorrhoid flare up. Diarrhea and SOB improved towards the end of the hospital stay.     History of Present Ilness:   Guillaume Salinas (Guillaume) is a pleasant 69 y.o.male with a past medical history of MDS who is status post haploidentical stem cell transplantation conditioned with FluMel 100 + PTCy+ 2Gy TBI who is currently day +95  who presents for post-transplant follow up. Offered  through language services, declined.    Interval History:   Patient presents for routine follow-up. He reports doing well. His appetite is great with him eating three full meals daily with snacks without no nausea/emesis.  No rash. No pruritis. No diarrhea.  His restless leg syndrome and insomnia are better but he still wakes up throughout the night. He has not received promacta yet.    Mild headache today and for past 3 days above L eye, not particularly bothersome - no changes to vision, no weakness, no trauma. No sinus congestion. No other complaints, no infectious concerns.    PAST MEDICAL HISTORY:   Past Medical  History:   Diagnosis Date    Anticoagulant long-term use     Coronary artery disease     Hypertension     Myelodysplastic syndrome     Peripheral vascular disease, unspecified        PAST SURGICAL HISTORY:   Past Surgical History:   Procedure Laterality Date    BONE MARROW BIOPSY Left 2023    Procedure: Biopsy-bone marrow;  Surgeon: Harry Diamond MD;  Location: [facility] OR;  Service: Oncology;  Laterality: Left;    COLONOSCOPY N/A 2022    Procedure: COLONOSCOPY Golytely Vaccinated will bring cards;  Surgeon: Dereje Simon MD;  Location: [facility] ENDO;  Service: Endoscopy;  Laterality: N/A;  Do not cancel this order    INSERTION OF LEMONS CATHETER Right 2024    Procedure: INSERTION, CATHETER, CENTRAL VENOUS, LEMONS TRIPLE LUMEN;  Surgeon: Kg Patten MD;  Location: [address];  Service: General;  Laterality: Right;     PAST SOCIAL HISTORY:  Social History     Socioeconomic History    Marital status:    Tobacco Use    Smoking status: Former     Current packs/day: 0.00     Average packs/day: 0.3 packs/day for 50.0 years (12.5 ttl pk-yrs)     Types: Cigarettes     Start date: 3/1/1973     Quit date: 3/1/2023     Years since quittin.8     Passive exposure: Past    Smokeless tobacco: Never   Substance and Sexual Activity    Alcohol use: Not Currently    Drug use: Never    Sexual activity: Not Currently     Partners: Female     Social Drivers of Health     Financial Resource Strain: Low Risk  (2024)    Overall Financial Resource Strain (CARDIA)     Difficulty of Paying Living Expenses: Not hard at all   Food Insecurity: No Food Insecurity (2024)    Hunger Vital Sign     Worried About Running Out of Food in the Last Year: Never true     Ran Out of Food in the Last Year: Never true   Transportation Needs: No Transportation Needs (2024)    TRANSPORTATION NEEDS     Transportation : No   Physical Activity: Insufficiently Active (2024)    Exercise Vital Sign      Days of Exercise per Week: 2 days     Minutes of Exercise per Session: 60 min   Stress: Stress Concern Present (9/30/2024)    [employer] [place] [employer] - Occupational Stress Questionnaire     Feeling of Stress : To some extent   Housing Stability: Low Risk  (9/30/2024)    Housing Stability Vital Sign     Unable to Pay for Housing in the Last Year: No     Homeless in the Last Year: No       FAMILY HISTORY:  Cancer-related family history includes Cancer in his brother and father.    CURRENT MEDICATIONS:   Medication List with Changes/Refills   Current Medications    ACYCLOVIR (ZOVIRAX) 800 MG TAB    Take 1 tablet (800 mg total) by mouth 2 (two) times daily.    CARVEDILOL (COREG) 6.25 MG TABLET    Take 1 tablet (6.25 mg total) by mouth 2 (two) times daily.    COPPER GLUCONATE 2 MG CAP    Take 2 mg by mouth once daily.    ELTROMBOPAG OLAMINE (PROMACTA) 50 MG TAB    Take 1 tablet (50 mg total) by mouth once daily.    FOLIC ACID (FOLVITE) 1 MG TABLET    Take 1 tablet (1 mg total) by mouth once daily.    GABAPENTIN (NEURONTIN) 300 MG CAPSULE    Take 2 capsules (600 mg total) by mouth every evening.    LETERMOVIR (PREVYMIS) 480 MG TAB    Take 1 tablet (480 mg total) by mouth Daily.    MAGNESIUM OXIDE (MAG-OX) 400 MG (241.3 MG MAGNESIUM) TABLET    Take 2 tablets (800 mg total) by mouth 2 (two) times daily.    PANTOPRAZOLE (PROTONIX) 40 MG TABLET    Take 1 tablet (40 mg total) by mouth once daily.    POSACONAZOLE (NOXAFIL) 100 MG TBEC TABLET    Take 3 tablets (300 mg total) by mouth once daily.    ROPINIROLE (REQUIP) 0.5 MG TABLET    Take 1 tablet (0.5 mg total) by mouth every evening.    TRAMADOL (ULTRAM) 50 MG TABLET    Take 1 tablet (50 mg total) by mouth every 6 (six) hours as needed for Pain.    ZOLPIDEM (AMBIEN) 5 MG TAB    Take 1 tablet (5 mg total) by mouth nightly as needed (insomnia).   Changed and/or Refilled Medications    Modified Medication Previous Medication    SULFAMETHOXAZOLE-TRIMETHOPRIM  800-160MG (BACTRIM DS) 800-160 MG TAB sulfamethoxazole-trimethoprim 800-160mg (BACTRIM DS) 800-160 mg Tab       Take 1 tablet by mouth every Mon, Wed, Fri. START 10/21/24    Take 1 tablet by mouth every Mon, Wed, Fri. START 10/21/24    TACROLIMUS (PROGRAF) 0.5 MG CAP tacrolimus (PROGRAF) 0.5 MG Cap       Take 1 capsule (0.5 mg total) by mouth every morning AND 1 capsule (0.5 mg total) every evening.    Take 2 capsules (1 mg total) by mouth every morning AND 1 capsule (0.5 mg total) every evening.       ALLERGIES:   Review of patient's allergies indicates:  No Known Allergies    GVHD Review of Systems:     Pertinent positives and negatives included in the HPI. Otherwise a 14 point review of systems is negative. GVHD review of systems recorded in BMT flowsheet.     Physical Exam:     Vitals:    12/23/24 0810   BP: 111/67   Pulse: 89   Temp: 98.7 °F (37.1 °C)       General: Appears well, NAD.   HEENT: MMM, no OP lesions  Pulmonary: CTAB, no increased work of breathing, no W/R/C  Cardiovascular: S1S2 normal, RRR, no M/R/G  Abdominal: Soft, NT, ND, BS+, no HSM  Extremities: No C/C/E  Neurological: AAOx4, grossly normal, no focal deficits  Dermatologic: No appreciable rashes or lesions    ECOG Performance Status: (foot note - ECOG PS provided by Eastern Cooperative Oncology Group) 1 - Symptomatic but completely ambulatory    Karnofsky Performance Score:  80%- Normal Activity with Effort: Some Symptoms of Disease    Labs:   Lab Results   Component Value Date    WBC 1.53 (LL) 12/23/2024    HGB 11.3 (L) 12/23/2024    HCT 32.9 (L) 12/23/2024     (H) 12/23/2024    PLT 19 (LL) 12/23/2024        CMP  Sodium   Date Value Ref Range Status   12/23/2024 138 136 - 145 mmol/L Final     Potassium   Date Value Ref Range Status   12/23/2024 4.3 3.5 - 5.1 mmol/L Final     Chloride   Date Value Ref Range Status   12/23/2024 103 95 - 110 mmol/L Final     CO2   Date Value Ref Range Status   12/23/2024 25 23 - 29 mmol/L Final      Glucose   Date Value Ref Range Status   12/23/2024 141 (H) 70 - 110 mg/dL Final     BUN   Date Value Ref Range Status   12/23/2024 17 8 - 23 mg/dL Final     Creatinine   Date Value Ref Range Status   12/23/2024 0.8 0.5 - 1.4 mg/dL Final     Calcium   Date Value Ref Range Status   12/23/2024 9.0 8.7 - 10.5 mg/dL Final     Total Protein   Date Value Ref Range Status   12/23/2024 5.6 (L) 6.0 - 8.4 g/dL Final     Albumin   Date Value Ref Range Status   12/23/2024 3.3 (L) 3.5 - 5.2 g/dL Final     Total Bilirubin   Date Value Ref Range Status   12/23/2024 0.6 0.1 - 1.0 mg/dL Final     Comment:     For infants and newborns, interpretation of results should be based  on gestational age, weight and in agreement with clinical  observations.    Premature Infant recommended reference ranges:  Up to 24 hours.............<8.0 mg/dL  Up to 48 hours............<12.0 mg/dL  3-5 days..................<15.0 mg/dL  6-29 days.................<15.0 mg/dL       Alkaline Phosphatase   Date Value Ref Range Status   12/23/2024 71 40 - 150 U/L Final     AST   Date Value Ref Range Status   12/23/2024 20 10 - 40 U/L Final     ALT   Date Value Ref Range Status   12/23/2024 36 10 - 44 U/L Final     Anion Gap   Date Value Ref Range Status   12/23/2024 10 8 - 16 mmol/L Final     eGFR   Date Value Ref Range Status   12/23/2024 >60.0 >60 mL/min/1.73 m^2 Final          Imaging:   Hospital imaging reviewed.    Pathology:  Prior pathology reviewed. Plan for day +30 bone marrow biopsy on 10/23/24    Acute GVHD Scoring:  GVHD Acute Assessment      No GVHD at this time.               Assessment and Plan:   Guillaume Salinas (Guillaume) is a pleasant 69 y.o.male with a past medical history of MDS s/p haplo SCT who presents for post-transplant follow up.    MDS: Status post treatment with azacitidine 75 mg/m2 daily x7 days plus venetoclax 100mg (voriconazole) daily x14 of 28 days.Venetoclax decreased to 7 days with cycle 3 until completion of 11  cycles. No plans for maintenance at this time.     Status post allogeneic stem cell transplantation: As noted above, status post haploidentical stem cell transplantation conditioned with FluMel 100 + PTCy+ 2Gy TBI . Currently Day+ 95. Engrafted on 10/09/24 day +20.  Day 30 bone marrow (11/5/2024) showing mildly hypercellular marrow with no increased blast (0.3%) and no evidence of myeloid neoplasm. Pending chimerisms, NGS, and CG  Day 100 bone marrow biopsy on 12/31/24  Plan for central line removal in January with gen surg     3.    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 Persistent nausea despite anti-emetics, reducing pill burden. 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 He started budesonide 3mg TID on 10/28/24. Due to persistent nausea despite budesonide so started systemic steroids 11/1 at 0.5 mg/kg and his steroid taper has been given to him. Steroid taper completed 12/8/24.  Current tacro dose: 2 capsules (1mg) in the morning and 1 capsules (0.5 mg) in the evening.   Last tacro level: 16.2  Adjustments: Confirmed that patient held AM tacro before labs. Will hold evening dose. Resume tomorrow with 0.5mg BID.     4.     Immunosuppression: Prevention with posaconazole, acyclovir. CMV prophylaxis with letermovir. PJP prophyalxis Bactrim MWF. Continue weekly monitoring of CMV and EBV.  Last CMV: Not-detected, last EBV: Not-detected.   Active infections: N/A    Pancytopenia: Due to underlying disease and chemotherapy. Transfuseu for Hgb <7 g/dL and platelets <10k. Folate and copper deficient, on supplementation. Will continue to monitor to see if platelets begin to rise with his supplements. Promacta sent in on 12/9/24. Working on aditya for financial assistance for promacta.  Folic Acid deficiency: Continue folic acid 1mg daily  Copper deficiency: Copper level of 635, continue copper gluconate 2mg daily     Hypomagnesemia:  Related to tacro, poor po intake. Continue MagOx to 800mg twice daily.      Chemotherapy Induced Nausea: Resolved with steroids. Taperinf budesonide. Patient has his taper in Swedish.      Anxiety, Restless Leg Syndrome: Noted since discharge by family. He started ropinirole 0.5mg and has experienced significant improvement.    Insomnia: Patient with significant improvement. Still wakes up a couple of times during the night but feels well rested in the mornings. Continue Ambien and Ropinirole for RLS.     Follow up: Twice weekly follow up with labs.    Orders Placed:           Medical Complexity:   Visit today included increased complexity associated with the care of the episodic problems addressed above and managing the longitudinal care of the patient due to the serious and/or complex managed problem(s) history of allo SCT.      Follow Up:      Twice weekly follow up as scheduled.       A total of 30 minutes was spent in pre-visit chart review, personal interpretation of labs and imaging, and medication review. Total visit time 40 minutes, >50 % counseling.

## 2024-12-26 ENCOUNTER — HOSPITAL ENCOUNTER (INPATIENT)
Facility: HOSPITAL | Age: 69
LOS: 10 days | Discharge: HOME HEALTH | DRG: 809 | End: 2025-01-06
Attending: EMERGENCY MEDICINE | Admitting: INTERNAL MEDICINE
Payer: MEDICARE

## 2024-12-26 ENCOUNTER — DOCUMENTATION ONLY (OUTPATIENT)
Dept: HEMATOLOGY/ONCOLOGY | Facility: CLINIC | Age: 69
End: 2024-12-26
Payer: MEDICARE

## 2024-12-26 ENCOUNTER — TELEPHONE (OUTPATIENT)
Dept: HEMATOLOGY/ONCOLOGY | Facility: CLINIC | Age: 69
End: 2024-12-26
Payer: MEDICARE

## 2024-12-26 ENCOUNTER — OFFICE VISIT (OUTPATIENT)
Dept: HEMATOLOGY/ONCOLOGY | Facility: CLINIC | Age: 69
End: 2024-12-26
Payer: MEDICARE

## 2024-12-26 ENCOUNTER — INFUSION (OUTPATIENT)
Dept: INFUSION THERAPY | Facility: HOSPITAL | Age: 69
End: 2024-12-26
Payer: MEDICARE

## 2024-12-26 ENCOUNTER — PATIENT MESSAGE (OUTPATIENT)
Dept: HEMATOLOGY/ONCOLOGY | Facility: CLINIC | Age: 69
End: 2024-12-26
Payer: MEDICARE

## 2024-12-26 VITALS
HEIGHT: 69 IN | TEMPERATURE: 98 F | WEIGHT: 139.88 LBS | OXYGEN SATURATION: 99 % | BODY MASS INDEX: 20.72 KG/M2 | DIASTOLIC BLOOD PRESSURE: 52 MMHG | HEART RATE: 104 BPM | RESPIRATION RATE: 18 BRPM | SYSTOLIC BLOOD PRESSURE: 84 MMHG

## 2024-12-26 DIAGNOSIS — Z94.81 S/P ALLOGENEIC BONE MARROW TRANSPLANT: Primary | ICD-10-CM

## 2024-12-26 DIAGNOSIS — R07.9 CHEST PAIN: ICD-10-CM

## 2024-12-26 DIAGNOSIS — D61.810 PANCYTOPENIA DUE TO ANTINEOPLASTIC CHEMOTHERAPY: ICD-10-CM

## 2024-12-26 DIAGNOSIS — D84.822 IMMUNODEFICIENCY DUE TO EXTERNAL CAUSE: ICD-10-CM

## 2024-12-26 DIAGNOSIS — D53.9 MACROCYTIC ANEMIA: ICD-10-CM

## 2024-12-26 DIAGNOSIS — Z76.82 STEM CELL TRANSPLANT CANDIDATE: ICD-10-CM

## 2024-12-26 DIAGNOSIS — D69.3 IDIOPATHIC THROMBOCYTOPENIC PURPURA (ITP): ICD-10-CM

## 2024-12-26 DIAGNOSIS — D84.822 IMMUNOCOMPROMISED STATE ASSOCIATED WITH STEM CELL TRANSPLANT: ICD-10-CM

## 2024-12-26 DIAGNOSIS — N17.9 ACUTE KIDNEY INJURY: ICD-10-CM

## 2024-12-26 DIAGNOSIS — E86.1 HYPOTENSION DUE TO HYPOVOLEMIA: ICD-10-CM

## 2024-12-26 DIAGNOSIS — R11.0 NAUSEA: ICD-10-CM

## 2024-12-26 DIAGNOSIS — D76.1 HLH (HEMOPHAGOCYTIC LYMPHOHISTIOCYTOSIS): ICD-10-CM

## 2024-12-26 DIAGNOSIS — D70.9 NEUTROPENIC FEVER: ICD-10-CM

## 2024-12-26 DIAGNOSIS — Z13.6 SCREENING FOR CARDIOVASCULAR CONDITION: ICD-10-CM

## 2024-12-26 DIAGNOSIS — D46.9 MYELODYSPLASTIC SYNDROME: Primary | ICD-10-CM

## 2024-12-26 DIAGNOSIS — A41.9 SEPSIS, DUE TO UNSPECIFIED ORGANISM, UNSPECIFIED WHETHER ACUTE ORGAN DYSFUNCTION PRESENT: Primary | ICD-10-CM

## 2024-12-26 DIAGNOSIS — D46.9 MYELODYSPLASTIC SYNDROME: ICD-10-CM

## 2024-12-26 DIAGNOSIS — R50.81 NEUTROPENIC FEVER: ICD-10-CM

## 2024-12-26 DIAGNOSIS — R00.0 TACHYCARDIA: ICD-10-CM

## 2024-12-26 DIAGNOSIS — Z86.2 HISTORY OF GRAFT VERSUS HOST DISEASE: ICD-10-CM

## 2024-12-26 DIAGNOSIS — Z94.84 IMMUNOCOMPROMISED STATE ASSOCIATED WITH STEM CELL TRANSPLANT: ICD-10-CM

## 2024-12-26 DIAGNOSIS — T45.1X5A PANCYTOPENIA DUE TO ANTINEOPLASTIC CHEMOTHERAPY: ICD-10-CM

## 2024-12-26 DIAGNOSIS — Z94.81 S/P ALLOGENEIC BONE MARROW TRANSPLANT: ICD-10-CM

## 2024-12-26 PROBLEM — D89.813 GRAFT VS HOST DISEASE: Status: ACTIVE | Noted: 2024-12-26

## 2024-12-26 PROBLEM — I95.9 HYPOTENSION: Status: ACTIVE | Noted: 2024-12-26

## 2024-12-26 PROBLEM — G25.81 RLS (RESTLESS LEGS SYNDROME): Status: ACTIVE | Noted: 2024-12-26

## 2024-12-26 PROBLEM — D84.9 IMMUNOSUPPRESSION: Status: ACTIVE | Noted: 2024-12-26

## 2024-12-26 PROBLEM — D61.818 PANCYTOPENIA: Status: ACTIVE | Noted: 2024-12-26

## 2024-12-26 LAB
ABO + RH BLD: NORMAL
ADENOVIRUS: NOT DETECTED
ALBUMIN SERPL BCP-MCNC: 3.3 G/DL (ref 3.5–5.2)
ALLENS TEST: NORMAL
ALP SERPL-CCNC: 62 U/L (ref 40–150)
ALT SERPL W/O P-5'-P-CCNC: 29 U/L (ref 10–44)
ANION GAP SERPL CALC-SCNC: 10 MMOL/L (ref 8–16)
ANISOCYTOSIS BLD QL SMEAR: SLIGHT
AST SERPL-CCNC: 19 U/L (ref 10–40)
BASOPHILS # BLD AUTO: 0 K/UL (ref 0–0.2)
BASOPHILS NFR BLD: 0 % (ref 0–1.9)
BILIRUB SERPL-MCNC: 0.5 MG/DL (ref 0.1–1)
BILIRUB UR QL STRIP: NEGATIVE
BLD GP AB SCN CELLS X3 SERPL QL: NORMAL
BORDETELLA PARAPERTUSSIS (IS1001): NOT DETECTED
BORDETELLA PERTUSSIS (PTXP): NOT DETECTED
BUN SERPL-MCNC: 15 MG/DL (ref 8–23)
CALCIUM SERPL-MCNC: 9 MG/DL (ref 8.7–10.5)
CHLAMYDIA PNEUMONIAE: NOT DETECTED
CHLORIDE SERPL-SCNC: 103 MMOL/L (ref 95–110)
CLARITY UR REFRACT.AUTO: CLEAR
CO2 SERPL-SCNC: 23 MMOL/L (ref 23–29)
COLOR UR AUTO: YELLOW
CORONAVIRUS 229E, COMMON COLD VIRUS: NOT DETECTED
CORONAVIRUS HKU1, COMMON COLD VIRUS: NOT DETECTED
CORONAVIRUS NL63, COMMON COLD VIRUS: NOT DETECTED
CORONAVIRUS OC43, COMMON COLD VIRUS: NOT DETECTED
CREAT SERPL-MCNC: 1.2 MG/DL (ref 0.5–1.4)
DACRYOCYTES BLD QL SMEAR: ABNORMAL
DIFFERENTIAL METHOD BLD: ABNORMAL
DOHLE BOD BLD QL SMEAR: PRESENT
EOSINOPHIL # BLD AUTO: 0 K/UL (ref 0–0.5)
EOSINOPHIL NFR BLD: 0.6 % (ref 0–8)
ERYTHROCYTE [DISTWIDTH] IN BLOOD BY AUTOMATED COUNT: 17 % (ref 11.5–14.5)
EST. GFR  (NO RACE VARIABLE): >60 ML/MIN/1.73 M^2
FLUBV RNA NPH QL NAA+NON-PROBE: NOT DETECTED
GLUCOSE SERPL-MCNC: 163 MG/DL (ref 70–110)
GLUCOSE UR QL STRIP: NEGATIVE
HCT VFR BLD AUTO: 33 % (ref 40–54)
HGB BLD-MCNC: 11.4 G/DL (ref 14–18)
HGB UR QL STRIP: NEGATIVE
HPIV1 RNA NPH QL NAA+NON-PROBE: NOT DETECTED
HPIV2 RNA NPH QL NAA+NON-PROBE: NOT DETECTED
HPIV3 RNA NPH QL NAA+NON-PROBE: NOT DETECTED
HPIV4 RNA NPH QL NAA+NON-PROBE: NOT DETECTED
HUMAN METAPNEUMOVIRUS: NOT DETECTED
IMM GRANULOCYTES # BLD AUTO: 0.01 K/UL (ref 0–0.04)
IMM GRANULOCYTES NFR BLD AUTO: 0.6 % (ref 0–0.5)
INFLUENZA A (SUBTYPES H1,H1-2009,H3): NOT DETECTED
INFLUENZA A, MOLECULAR: NEGATIVE
INFLUENZA B, MOLECULAR: NEGATIVE
KETONES UR QL STRIP: NEGATIVE
LDH SERPL L TO P-CCNC: 1.93 MMOL/L (ref 0.5–2.2)
LEUKOCYTE ESTERASE UR QL STRIP: NEGATIVE
LYMPHOCYTES # BLD AUTO: 0.4 K/UL (ref 1–4.8)
LYMPHOCYTES NFR BLD: 27.7 % (ref 18–48)
MAGNESIUM SERPL-MCNC: 1.6 MG/DL (ref 1.6–2.6)
MCH RBC QN AUTO: 37.7 PG (ref 27–31)
MCHC RBC AUTO-ENTMCNC: 34.5 G/DL (ref 32–36)
MCV RBC AUTO: 109 FL (ref 82–98)
MONOCYTES # BLD AUTO: 0.2 K/UL (ref 0.3–1)
MONOCYTES NFR BLD: 11.9 % (ref 4–15)
MYCOPLASMA PNEUMONIAE: NOT DETECTED
NEUTROPHILS # BLD AUTO: 0.9 K/UL (ref 1.8–7.7)
NEUTROPHILS NFR BLD: 59.2 % (ref 38–73)
NITRITE UR QL STRIP: NEGATIVE
NRBC BLD-RTO: 0 /100 WBC
OHS QRS DURATION: 70 MS
OHS QTC CALCULATION: 447 MS
OVALOCYTES BLD QL SMEAR: ABNORMAL
PH UR STRIP: 6 [PH] (ref 5–8)
PHOSPHATE SERPL-MCNC: 2.7 MG/DL (ref 2.7–4.5)
PLATELET # BLD AUTO: 19 K/UL (ref 150–450)
PMV BLD AUTO: 10.3 FL (ref 9.2–12.9)
POIKILOCYTOSIS BLD QL SMEAR: SLIGHT
POLYCHROMASIA BLD QL SMEAR: ABNORMAL
POTASSIUM SERPL-SCNC: 4.5 MMOL/L (ref 3.5–5.1)
PROT SERPL-MCNC: 5.8 G/DL (ref 6–8.4)
PROT UR QL STRIP: NEGATIVE
RBC # BLD AUTO: 3.02 M/UL (ref 4.6–6.2)
RESPIRATORY INFECTION PANEL SOURCE: NORMAL
RSV RNA NPH QL NAA+NON-PROBE: NOT DETECTED
RV+EV RNA NPH QL NAA+NON-PROBE: NOT DETECTED
SAMPLE: NORMAL
SARS-COV-2 RNA RESP QL NAA+PROBE: NOT DETECTED
SITE: NORMAL
SODIUM SERPL-SCNC: 136 MMOL/L (ref 136–145)
SP GR UR STRIP: 1.01 (ref 1–1.03)
SPECIMEN OUTDATE: NORMAL
SPECIMEN SOURCE: NORMAL
TACROLIMUS BLD-MCNC: 13.2 NG/ML (ref 5–15)
URN SPEC COLLECT METH UR: NORMAL
WBC # BLD AUTO: 1.59 K/UL (ref 3.9–12.7)

## 2024-12-26 PROCEDURE — A4216 STERILE WATER/SALINE, 10 ML: HCPCS | Performed by: INTERNAL MEDICINE

## 2024-12-26 PROCEDURE — G0378 HOSPITAL OBSERVATION PER HR: HCPCS

## 2024-12-26 PROCEDURE — 96365 THER/PROPH/DIAG IV INF INIT: CPT

## 2024-12-26 PROCEDURE — 83605 ASSAY OF LACTIC ACID: CPT

## 2024-12-26 PROCEDURE — 99900035 HC TECH TIME PER 15 MIN (STAT)

## 2024-12-26 PROCEDURE — 25000003 PHARM REV CODE 250

## 2024-12-26 PROCEDURE — 93005 ELECTROCARDIOGRAM TRACING: CPT

## 2024-12-26 PROCEDURE — 25000003 PHARM REV CODE 250: Performed by: EMERGENCY MEDICINE

## 2024-12-26 PROCEDURE — 87040 BLOOD CULTURE FOR BACTERIA: CPT

## 2024-12-26 PROCEDURE — 80053 COMPREHEN METABOLIC PANEL: CPT | Performed by: INTERNAL MEDICINE

## 2024-12-26 PROCEDURE — 80197 ASSAY OF TACROLIMUS: CPT | Performed by: INTERNAL MEDICINE

## 2024-12-26 PROCEDURE — 99285 EMERGENCY DEPT VISIT HI MDM: CPT | Mod: 25

## 2024-12-26 PROCEDURE — 86900 BLOOD TYPING SEROLOGIC ABO: CPT | Performed by: EMERGENCY MEDICINE

## 2024-12-26 PROCEDURE — 63600175 PHARM REV CODE 636 W HCPCS: Performed by: EMERGENCY MEDICINE

## 2024-12-26 PROCEDURE — 36592 COLLECT BLOOD FROM PICC: CPT

## 2024-12-26 PROCEDURE — 63600175 PHARM REV CODE 636 W HCPCS: Performed by: INTERNAL MEDICINE

## 2024-12-26 PROCEDURE — 80197 ASSAY OF TACROLIMUS: CPT | Mod: 91

## 2024-12-26 PROCEDURE — 87799 DETECT AGENT NOS DNA QUANT: CPT | Performed by: INTERNAL MEDICINE

## 2024-12-26 PROCEDURE — 25000003 PHARM REV CODE 250: Performed by: INTERNAL MEDICINE

## 2024-12-26 PROCEDURE — 87798 DETECT AGENT NOS DNA AMP: CPT | Mod: 59 | Performed by: STUDENT IN AN ORGANIZED HEALTH CARE EDUCATION/TRAINING PROGRAM

## 2024-12-26 PROCEDURE — 87502 INFLUENZA DNA AMP PROBE: CPT

## 2024-12-26 PROCEDURE — 83735 ASSAY OF MAGNESIUM: CPT | Performed by: INTERNAL MEDICINE

## 2024-12-26 PROCEDURE — 99999 PR PBB SHADOW E&M-EST. PATIENT-LVL IV: CPT | Mod: PBBFAC,,, | Performed by: INTERNAL MEDICINE

## 2024-12-26 PROCEDURE — 96360 HYDRATION IV INFUSION INIT: CPT | Mod: 59

## 2024-12-26 PROCEDURE — 96367 TX/PROPH/DG ADDL SEQ IV INF: CPT

## 2024-12-26 PROCEDURE — 81003 URINALYSIS AUTO W/O SCOPE: CPT

## 2024-12-26 PROCEDURE — 93010 ELECTROCARDIOGRAM REPORT: CPT | Mod: ,,, | Performed by: INTERNAL MEDICINE

## 2024-12-26 PROCEDURE — 63600175 PHARM REV CODE 636 W HCPCS

## 2024-12-26 PROCEDURE — 85025 COMPLETE CBC W/AUTO DIFF WBC: CPT | Performed by: INTERNAL MEDICINE

## 2024-12-26 PROCEDURE — 84100 ASSAY OF PHOSPHORUS: CPT | Performed by: INTERNAL MEDICINE

## 2024-12-26 RX ORDER — HEPARIN 100 UNIT/ML
500 SYRINGE INTRAVENOUS
OUTPATIENT
Start: 2024-12-26

## 2024-12-26 RX ORDER — LANOLIN ALCOHOL/MO/W.PET/CERES
400 CREAM (GRAM) TOPICAL EVERY 4 HOURS PRN
Status: DISCONTINUED | OUTPATIENT
Start: 2024-12-26 | End: 2024-12-29

## 2024-12-26 RX ORDER — PANTOPRAZOLE SODIUM 40 MG/1
40 TABLET, DELAYED RELEASE ORAL DAILY
Status: DISCONTINUED | OUTPATIENT
Start: 2024-12-26 | End: 2025-01-06 | Stop reason: HOSPADM

## 2024-12-26 RX ORDER — TACROLIMUS 0.5 MG/1
0.5 CAPSULE ORAL EVERY MORNING
Qty: 30 CAPSULE | Refills: 11 | Status: ON HOLD | OUTPATIENT
Start: 2024-12-26 | End: 2025-12-26

## 2024-12-26 RX ORDER — POSACONAZOLE 100 MG/1
300 TABLET, DELAYED RELEASE ORAL DAILY
Status: DISCONTINUED | OUTPATIENT
Start: 2024-12-26 | End: 2025-01-06 | Stop reason: HOSPADM

## 2024-12-26 RX ORDER — ATOVAQUONE 750 MG/5ML
1500 SUSPENSION ORAL DAILY
Qty: 210 ML | Refills: 2 | Status: ON HOLD | OUTPATIENT
Start: 2024-12-26

## 2024-12-26 RX ORDER — SODIUM CHLORIDE 0.9 % (FLUSH) 0.9 %
10 SYRINGE (ML) INJECTION EVERY 12 HOURS PRN
Status: DISCONTINUED | OUTPATIENT
Start: 2024-12-26 | End: 2024-12-30

## 2024-12-26 RX ORDER — IBUPROFEN 200 MG
16 TABLET ORAL
Status: DISCONTINUED | OUTPATIENT
Start: 2024-12-26 | End: 2025-01-06 | Stop reason: HOSPADM

## 2024-12-26 RX ORDER — POTASSIUM CHLORIDE 20 MEQ/1
20 TABLET, EXTENDED RELEASE ORAL
Status: DISCONTINUED | OUTPATIENT
Start: 2024-12-26 | End: 2025-01-06 | Stop reason: HOSPADM

## 2024-12-26 RX ORDER — SODIUM CHLORIDE 0.9 % (FLUSH) 0.9 %
10 SYRINGE (ML) INJECTION
OUTPATIENT
Start: 2024-12-26

## 2024-12-26 RX ORDER — LEVOFLOXACIN 500 MG/1
500 TABLET, FILM COATED ORAL DAILY
Status: DISCONTINUED | OUTPATIENT
Start: 2024-12-26 | End: 2024-12-27

## 2024-12-26 RX ORDER — ONDANSETRON 8 MG/1
8 TABLET, ORALLY DISINTEGRATING ORAL EVERY 8 HOURS PRN
Status: DISCONTINUED | OUTPATIENT
Start: 2024-12-26 | End: 2025-01-06 | Stop reason: HOSPADM

## 2024-12-26 RX ORDER — CALCIUM CARBONATE/VITAMIN D3 500-10/5ML
2 LIQUID (ML) ORAL DAILY
Status: DISCONTINUED | OUTPATIENT
Start: 2024-12-27 | End: 2025-01-06 | Stop reason: HOSPADM

## 2024-12-26 RX ORDER — HEPARIN 100 UNIT/ML
500 SYRINGE INTRAVENOUS
Status: DISCONTINUED | OUTPATIENT
Start: 2024-12-26 | End: 2024-12-26 | Stop reason: HOSPADM

## 2024-12-26 RX ORDER — TRAMADOL HYDROCHLORIDE 50 MG/1
50 TABLET ORAL EVERY 6 HOURS PRN
Status: DISCONTINUED | OUTPATIENT
Start: 2024-12-26 | End: 2025-01-06 | Stop reason: HOSPADM

## 2024-12-26 RX ORDER — NALOXONE HCL 0.4 MG/ML
0.02 VIAL (ML) INJECTION
Status: DISCONTINUED | OUTPATIENT
Start: 2024-12-26 | End: 2025-01-06 | Stop reason: HOSPADM

## 2024-12-26 RX ORDER — ACETAMINOPHEN 500 MG
1000 TABLET ORAL
Status: COMPLETED | OUTPATIENT
Start: 2024-12-26 | End: 2024-12-26

## 2024-12-26 RX ORDER — ONDANSETRON 8 MG/1
8 TABLET, ORALLY DISINTEGRATING ORAL EVERY 8 HOURS PRN
Qty: 30 TABLET | Refills: 1 | Status: ON HOLD | OUTPATIENT
Start: 2024-12-26

## 2024-12-26 RX ORDER — GABAPENTIN 300 MG/1
600 CAPSULE ORAL NIGHTLY
Status: DISCONTINUED | OUTPATIENT
Start: 2024-12-26 | End: 2025-01-06 | Stop reason: HOSPADM

## 2024-12-26 RX ORDER — GLUCAGON 1 MG
1 KIT INJECTION
Status: DISCONTINUED | OUTPATIENT
Start: 2024-12-26 | End: 2025-01-06 | Stop reason: HOSPADM

## 2024-12-26 RX ORDER — ZOLPIDEM TARTRATE 5 MG/1
5 TABLET ORAL NIGHTLY PRN
Status: DISCONTINUED | OUTPATIENT
Start: 2024-12-26 | End: 2025-01-06 | Stop reason: HOSPADM

## 2024-12-26 RX ORDER — LANOLIN ALCOHOL/MO/W.PET/CERES
800 CREAM (GRAM) TOPICAL EVERY 4 HOURS PRN
Status: DISCONTINUED | OUTPATIENT
Start: 2024-12-26 | End: 2024-12-29

## 2024-12-26 RX ORDER — ATOVAQUONE 750 MG/5ML
1500 SUSPENSION ORAL DAILY
Status: DISCONTINUED | OUTPATIENT
Start: 2024-12-26 | End: 2025-01-06 | Stop reason: HOSPADM

## 2024-12-26 RX ORDER — ACYCLOVIR 800 MG/1
800 TABLET ORAL 2 TIMES DAILY
Status: DISCONTINUED | OUTPATIENT
Start: 2024-12-26 | End: 2025-01-06 | Stop reason: HOSPADM

## 2024-12-26 RX ORDER — SODIUM CHLORIDE 0.9 % (FLUSH) 0.9 %
10 SYRINGE (ML) INJECTION
Status: DISCONTINUED | OUTPATIENT
Start: 2024-12-26 | End: 2024-12-26 | Stop reason: HOSPADM

## 2024-12-26 RX ORDER — LOPERAMIDE HCL 2 MG
2 TABLET ORAL 4 TIMES DAILY PRN
Status: ON HOLD | COMMUNITY

## 2024-12-26 RX ORDER — ROPINIROLE 0.25 MG/1
0.5 TABLET, FILM COATED ORAL NIGHTLY
Status: DISCONTINUED | OUTPATIENT
Start: 2024-12-26 | End: 2025-01-06 | Stop reason: HOSPADM

## 2024-12-26 RX ORDER — IBUPROFEN 200 MG
24 TABLET ORAL
Status: DISCONTINUED | OUTPATIENT
Start: 2024-12-26 | End: 2025-01-06 | Stop reason: HOSPADM

## 2024-12-26 RX ORDER — TACROLIMUS 0.5 MG/1
0.5 CAPSULE ORAL EVERY MORNING
Status: DISCONTINUED | OUTPATIENT
Start: 2024-12-26 | End: 2024-12-30

## 2024-12-26 RX ADMIN — ACETAMINOPHEN 1000 MG: 500 TABLET ORAL at 12:12

## 2024-12-26 RX ADMIN — TACROLIMUS 0.5 MG: 0.5 CAPSULE ORAL at 03:12

## 2024-12-26 RX ADMIN — LETERMOVIR 480 MG: 480 TABLET, FILM COATED ORAL at 05:12

## 2024-12-26 RX ADMIN — HEPARIN SODIUM (PORCINE) LOCK FLUSH IV SOLN 100 UNIT/ML 500 UNITS: 100 SOLUTION at 09:12

## 2024-12-26 RX ADMIN — ACYCLOVIR 800 MG: 800 TABLET ORAL at 10:12

## 2024-12-26 RX ADMIN — PIPERACILLIN SODIUM AND TAZOBACTAM SODIUM 4.5 G: 4; .5 INJECTION, POWDER, LYOPHILIZED, FOR SOLUTION INTRAVENOUS at 11:12

## 2024-12-26 RX ADMIN — SODIUM CHLORIDE, POTASSIUM CHLORIDE, SODIUM LACTATE AND CALCIUM CHLORIDE 1000 ML: 600; 310; 30; 20 INJECTION, SOLUTION INTRAVENOUS at 11:12

## 2024-12-26 RX ADMIN — SODIUM CHLORIDE, POTASSIUM CHLORIDE, SODIUM LACTATE AND CALCIUM CHLORIDE 1000 ML: 600; 310; 30; 20 INJECTION, SOLUTION INTRAVENOUS at 02:12

## 2024-12-26 RX ADMIN — PANTOPRAZOLE SODIUM 40 MG: 40 TABLET, DELAYED RELEASE ORAL at 03:12

## 2024-12-26 RX ADMIN — LEVOFLOXACIN 500 MG: 500 TABLET, FILM COATED ORAL at 03:12

## 2024-12-26 RX ADMIN — ROPINIROLE HYDROCHLORIDE 0.5 MG: 0.25 TABLET, FILM COATED ORAL at 10:12

## 2024-12-26 RX ADMIN — VANCOMYCIN HYDROCHLORIDE 1500 MG: 1.5 INJECTION, POWDER, LYOPHILIZED, FOR SOLUTION INTRAVENOUS at 12:12

## 2024-12-26 RX ADMIN — GABAPENTIN 600 MG: 300 CAPSULE ORAL at 10:12

## 2024-12-26 RX ADMIN — ZOLPIDEM TARTRATE 5 MG: 5 TABLET, FILM COATED ORAL at 10:12

## 2024-12-26 RX ADMIN — Medication 10 ML: at 09:12

## 2024-12-26 NOTE — H&P
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 He was seen in clinic by Dr. Kruse, his hematologist this morning in clinic and was noted to have pain in shoulders/heavy weight which he attributes to anemia, leg weakness, loss of appetite, and 2 days of mild to moderate diarrhea. Loperamide helped. He has not had fever, chills, abdominal pain, cough, sore throat, SOB, CP, or urinary discomfort. He was meant to get IV fluids after clinic today due to mild ADDISON (sCr 1.2, baseline 0.6-0.9) however presented to ED instead due to symptoms. He was started on vanc/Zosyn and received 1L IVF with improvement in BP from 73/45 to 95/60. Patient seen at bedside and feeling much better. He states that he has not had a lot to eat or drink for several days due to worsening appetite.     Patient had another episode of hypotension 72/45 with improvement again to 97/56 with 1L IVF. MICU evaluated and did not take due to improvement in BP. CXR with persistent improving or recurrent infiltrate at the left lung base with a small pleural effusion. UA pending. Bcx sent. , plt 19, Hgb 11.4.     Patient information was obtained from patient and ER records.     Oncology History:     Primary oncologist: Paco Hickey MD  Primary oncologic diagnosis: MDS  Transplant date: 9/19/2024  Donor: haploidentical  Blood Type (Patient): B +  Blood Type (Donor): A +  CMV (Patient): Positive  CMV (Donor): Positive  Graft source: Bone marrow  CD34+ cell dose: 3.55x10^6  Conditioning Regimen: Fludarabine plus melphalan 100 mg/m2 + 2Gy TBI  GVHD prophylaxis:  Post-transplant cyclophosphamide, Tacrolimus, MMF  Immediate post-transplant complications: Patient experienced expected GI toxicities with C diff negative diarrhea and nausea, neutropenic fever with negative infectious work up, expected cytopenias requiring transfusions, electrolyte abnormalities requiring replacement, volume overload requiring diuresis, intermittent SOB from pulmonary edema requiring 02, and a hemorrhoid flare up. Diarrhea and SOB improved towards the end of the hospital stay.        (Not in a hospital admission)      Patient has no known allergies.     Past Medical History:   Diagnosis Date    Anticoagulant long-term use     Coronary artery disease     Hypertension     Myelodysplastic syndrome     Peripheral vascular disease, unspecified      Past Surgical History:   Procedure Laterality Date    BONE MARROW BIOPSY Left 2023    Procedure: Biopsy-bone marrow;  Surgeon: Harry Diamond MD;  Location: [facility] OR;  Service: Oncology;  Laterality: Left;    COLONOSCOPY N/A 2022    Procedure: COLONOSCOPY Golytely Vaccinated will bring cards;  Surgeon: Dereje Simon MD;  Location: [facility];  Service: Endoscopy;  Laterality: N/A;  Do not cancel this order    INSERTION OF LEMONS CATHETER Right 2024    Procedure: INSERTION, CATHETER, CENTRAL VENOUS, LEMONS TRIPLE LUMEN;  Surgeon: Kg Patten MD;  Location: [address];  Service: General;  Laterality: Right;     Family History       Problem Relation (Age of Onset)    Cancer Father, Brother    Hypertension Mother    No Known Problems Daughter, Daughter, Son          Tobacco Use    Smoking status: Former     Current packs/day: 0.00     Average packs/day: 0.3 packs/day for 50.0 years (12.5 ttl pk-yrs)     Types: Cigarettes     Start date: 3/1/1973     Quit date: 3/1/2023     Years since quittin.8     Passive exposure: Past    Smokeless tobacco: Never   Substance and Sexual Activity    Alcohol use: Not Currently     Drug use: Never    Sexual activity: Not Currently     Partners: Female       Review of Systems   Constitutional:  Positive for appetite change. Negative for chills and fever.   HENT:  Negative for congestion and sore throat.    Respiratory:  Negative for cough and shortness of breath.    Cardiovascular:  Negative for chest pain, palpitations and leg swelling.   Gastrointestinal:  Positive for diarrhea. Negative for abdominal pain, nausea and vomiting.   Genitourinary:  Positive for frequency. Negative for dysuria.   Musculoskeletal:  Negative for back pain and myalgias.   Neurological:  Positive for weakness and light-headedness.   Psychiatric/Behavioral:  Negative for agitation and confusion.      Objective:     Vital Signs (Most Recent):  Temp: 100.3 °F (37.9 °C) (12/26/24 1231)  Pulse: 80 (12/26/24 1530)  Resp: 20 (12/26/24 1530)  BP: 107/65 (12/26/24 1530)  SpO2: 96 % (12/26/24 1530) Vital Signs (24h Range):  Temp:  [98.3 °F (36.8 °C)-100.3 °F (37.9 °C)] 100.3 °F (37.9 °C)  Pulse:  [] 80  Resp:  [16-22] 20  SpO2:  [96 %-99 %] 96 %  BP: ()/(45-65) 107/65     Weight: 63 kg (138 lb 14.2 oz)  Body mass index is 20.51 kg/m².  Body surface area is 1.75 meters squared.    ECOG SCORE           [unfilled]    Lines/Drains/Airways       Central Venous Catheter Line  Duration             Tunneled Central Line - Triple Lumen 09/13/24 1625 Internal Jugular Right 104 days              Peripheral Intravenous Line  Duration                  Peripheral IV - Single Lumen 12/26/24 1120 20 G Anterior;Distal;Left Upper Arm <1 day                     Physical Exam  Constitutional:       Appearance: Normal appearance.   HENT:      Head: Normocephalic and atraumatic.   Eyes:      Extraocular Movements: Extraocular movements intact.      Pupils: Pupils are equal, round, and reactive to light.   Cardiovascular:      Rate and Rhythm: Normal rate and regular rhythm.   Pulmonary:      Effort: Pulmonary effort is normal.      Breath  sounds: Normal breath sounds.   Abdominal:      General: Abdomen is flat.      Palpations: Abdomen is soft.   Musculoskeletal:         General: No swelling. Normal range of motion.      Cervical back: Normal range of motion. No rigidity.   Skin:     General: Skin is warm and dry.   Neurological:      General: No focal deficit present.      Mental Status: He is alert and oriented to person, place, and time.   Psychiatric:         Mood and Affect: Mood normal.         Behavior: Behavior normal.            Significant Labs:   CBC:   Recent Labs   Lab 12/26/24  0843   WBC 1.59*   HGB 11.4*   HCT 33.0*   PLT 19*    and CMP:   Recent Labs   Lab 12/26/24  0843      K 4.5      CO2 23   *   BUN 15   CREATININE 1.2   CALCIUM 9.0   PROT 5.8*   ALBUMIN 3.3*   BILITOT 0.5   ALKPHOS 62   AST 19   ALT 29   ANIONGAP 10       Diagnostic Results:  I have reviewed all pertinent imaging results/findings within the past 24 hours.  Assessment/Plan:     Neuro  RLS (restless legs syndrome)  Continue ropinirole     Cardiac/Vascular  * Hypotension  He was seen in clinic by Dr. Kruse, his hematologist 12/26 in clinic and was noted to have pain in shoulders/heavy weight which he attributed to anemia, leg weakness, loss of appetite, and 2 days of mild to moderate diarrhea. Loperamide helped. He has not had fever, chills, abdominal pain, cough, sore throat, SOB, CP, or urinary discomfort. He was meant to get IV fluids after clinic today due to mild ADDISON (sCr 1.2, baseline 0.6-0.9) however presented to ED instead due to symptoms. He was started on vanc/Zosyn and received 1L IVF with improvement in BP from 73/45 to 95/60. Patient seen at bedside and feeling much better. He states that he has not had a lot to eat or drink for several days due to worsening appetite. Patient had another episode of hypotension 72/45 with improvement again to 97/56 with 1L IVF. MICU evaluated and did not take due to improvement in BP. CXR with  persistent improving or recurrent infiltrate at the left lung base with a small pleural effusion. UA pending. Bcx sent.    Etiology of hypotension is likely decreased oral intake from poor appetite over last few days. Although he has mild diarrhea, he has not been febrile and denies chills, urinary discomfort (although has some frequency), SOB, cough, abdominal pain. Although CXR is difficult to determine if he has improving vs. recurrent infiltrate, he has no pulmonary symptoms and lungs are clear to auscultation. Will monitor off antibiotics for now.   - discontinue vanc/Zosyn  - f/u C. diff  - f/u stool studies  - f/u UA  - f/u BCx    Renal/  ADDISON (acute kidney injury)  Likely prerenal due to decreased PO intake. Received 2L IVF. Recheck renal function    Immunology/Multi System  Immunosuppression  Prevention with posaconazole, acyclovir. CMV prophylaxis with letermovir. PJP prophyalxis atovaquone. Will start Levaquin as ANC<1.0.    Graft vs host disease  GVHD prophylaxis with Post-transplant cyclophosphamide, Tacrolimus, MMF (MMF d/c on D+35). 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 He started budesonide 3mg TID on 10/28/24. Due to persistent nausea despite budesonide so started systemic steroids 11/1 at 0.5 mg/kg and his steroid taper has been given to him. Steroid taper completed 12/8/24. Tacro dose: 1 capsule (0.5 mg) in the morning and 1 capsules (0.5 mg) in the evening until 12/26/24.  - Last tacro level: 13.2  - Adjustments: Reduce tacro to 0.5 mg daily    Oncology  Pancytopenia  Due to underlying disease and chemotherapy. Transfuse for Hgb <7 g/dL and platelets <10k. Folate and copper deficient, on supplementation. Will continue to monitor to see if platelets begin to rise with his supplements. Promacta sent in on 12/9/24. Working on aditya for financial assistance for promacta. Changed TMP/SMX to atovaquone.   Folic Acid  deficiency: Continue folic acid 1mg daily  Copper deficiency: Copper level of 635, continue copper gluconate 2mg daily    S/P allogeneic bone marrow transplant  Status post haploidentical stem cell transplantation conditioned with FluMel 100 + PTCy+ 2Gy TBI . Currently Day+ 98. Engrafted on 10/09/24 day +20.  Day 30 bone marrow (11/5/2024) showing mildly hypercellular marrow with no increased blast (0.3%) and no evidence of myeloid neoplasm. Pending chimerisms, NGS, and CG  Day 100 bone marrow biopsy on 12/31/24  Plan for central line removal in January with gen surg     Myelodysplastic syndrome  Status post treatment with azacitidine 75 mg/m2 daily x7 days plus venetoclax 100mg (voriconazole) daily x14 of 28 days.Venetoclax decreased to 7 days with cycle 3 until completion of 11 cycles. No plans for maintenance at this time.     Other  Insomnia  Continue Ambien        VTE Risk Mitigation (From admission, onward)           Ordered     Reason for No Pharmacological VTE Prophylaxis  Once        Question:  Reasons:  Answer:  Thrombocytopenia    12/26/24 1330     IP VTE HIGH RISK PATIENT  Once         12/26/24 1330     Place sequential compression device  Until discontinued         12/26/24 1330                      Ozzie Echeverria DO  Hematology/Oncology Fellow, PGY-IV  [facility]

## 2024-12-26 NOTE — PROGRESS NOTES
Section of Hematology and Stem Cell Transplantation    Post-Transplantation Follow Up Visit     12/26/2024    Transplant History:   Primary oncologist: Paco Hickey MD  Primary oncologic diagnosis: MDS  Transplant date: 9/19/2024  Donor: haploidentical  Blood Type (Patient): B +  Blood Type (Donor): A +  CMV (Patient): Positive  CMV (Donor): Positive  Graft source: Bone marrow  CD34+ cell dose: 3.55x10^6  Conditioning Regimen: Fludarabine plus melphalan 100 mg/m2 + 2Gy TBI  GVHD prophylaxis: Post-transplant cyclophosphamide, Tacrolimus, MMF  Immediate post-transplant complications: Patient experienced expected GI toxicities with C diff negative diarrhea and nausea, neutropenic fever with negative infectious work up, expected cytopenias requiring transfusions, electrolyte abnormalities requiring replacement, volume overload requiring diuresis, intermittent SOB from pulmonary edema requiring 02, and a hemorrhoid flare up. Diarrhea and SOB improved towards the end of the hospital stay.     History of Present Ilness:   Guillaume Salinas (Guillaume) is a pleasant 69 y.o.male with a past medical history of MDS who is status post haploidentical stem cell transplantation conditioned with FluMel 100 + PTCy+ 2Gy TBI who is currently day +98  who presents for post-transplant follow up. Offered  through language services, declined.    Interval History:   Patient presents for routine follow-up. He reports doing well. His appetite is great with him eating three full meals daily with snacks without no nausea/emesis.  No rash. No pruritis. No diarrhea.  His restless leg syndrome and insomnia are better but he still wakes up throughout the night. He has not received promacta yet.    - pain in shoulders/heavy weight - he attributes to anemia  - legs are very weak - difficult to walk without taking a break  - losing appetite  - diarrhea is liquid, some developed 2 days ago - loperamide helped   - very little  nutrition    PAST MEDICAL HISTORY:   Past Medical History:   Diagnosis Date    Anticoagulant long-term use     Coronary artery disease     Hypertension     Myelodysplastic syndrome     Peripheral vascular disease, unspecified        PAST SURGICAL HISTORY:   Past Surgical History:   Procedure Laterality Date    BONE MARROW BIOPSY Left 2023    Procedure: Biopsy-bone marrow;  Surgeon: Harry Diamond MD;  Location: [facility] OR;  Service: Oncology;  Laterality: Left;    COLONOSCOPY N/A 2022    Procedure: COLONOSCOPY Golytely Vaccinated will bring cards;  Surgeon: Dereje Simon MD;  Location: [facility] ENDO;  Service: Endoscopy;  Laterality: N/A;  Do not cancel this order    INSERTION OF LEMONS CATHETER Right 2024    Procedure: INSERTION, CATHETER, CENTRAL VENOUS, LEMONS TRIPLE LUMEN;  Surgeon: Kg Patten MD;  Location: [address];  Service: General;  Laterality: Right;     PAST SOCIAL HISTORY:  Social History     Socioeconomic History    Marital status:    Tobacco Use    Smoking status: Former     Current packs/day: 0.00     Average packs/day: 0.3 packs/day for 50.0 years (12.5 ttl pk-yrs)     Types: Cigarettes     Start date: 3/1/1973     Quit date: 3/1/2023     Years since quittin.8     Passive exposure: Past    Smokeless tobacco: Never   Substance and Sexual Activity    Alcohol use: Not Currently    Drug use: Never    Sexual activity: Not Currently     Partners: Female     Social Drivers of Health     Financial Resource Strain: Low Risk  (2024)    Overall Financial Resource Strain (CARDIA)     Difficulty of Paying Living Expenses: Not hard at all   Food Insecurity: No Food Insecurity (2024)    Hunger Vital Sign     Worried About Running Out of Food in the Last Year: Never true     Ran Out of Food in the Last Year: Never true   Transportation Needs: No Transportation Needs (2024)    TRANSPORTATION NEEDS     Transportation : No   Physical Activity:  Insufficiently Active (8/9/2024)    Exercise Vital Sign     Days of Exercise per Week: 2 days     Minutes of Exercise per Session: 60 min   Stress: Stress Concern Present (9/30/2024)    [employer] - Occupational Stress Questionnaire     Feeling of Stress : To some extent   Housing Stability: Low Risk  (9/30/2024)    Housing Stability Vital Sign     Unable to Pay for Housing in the Last Year: No     Homeless in the Last Year: No       FAMILY HISTORY:  Cancer-related family history includes Cancer in his brother and father.    CURRENT MEDICATIONS:   Medication List with Changes/Refills   New Medications    ATOVAQUONE (MEPRON) 750 MG/5 ML SUSP ORAL LIQUID    Take 10 mLs (1,500 mg total) by mouth once daily.    ONDANSETRON (ZOFRAN-ODT) 8 MG TBDL    Take 1 tablet (8 mg total) by mouth every 8 (eight) hours as needed (nausea).   Current Medications    ACYCLOVIR (ZOVIRAX) 800 MG TAB    Take 1 tablet (800 mg total) by mouth 2 (two) times daily.    CARVEDILOL (COREG) 6.25 MG TABLET    Take 1 tablet (6.25 mg total) by mouth 2 (two) times daily.    COPPER GLUCONATE 2 MG CAP    Take 2 mg by mouth once daily.    ELTROMBOPAG OLAMINE (PROMACTA) 50 MG TAB    Take 1 tablet (50 mg total) by mouth once daily.    FOLIC ACID (FOLVITE) 1 MG TABLET    Take 1 tablet (1 mg total) by mouth once daily.    GABAPENTIN (NEURONTIN) 300 MG CAPSULE    Take 2 capsules (600 mg total) by mouth every evening.    LETERMOVIR (PREVYMIS) 480 MG TAB    Take 1 tablet (480 mg total) by mouth Daily.    LOPERAMIDE (IMODIUM A-D) 2 MG TAB    Take 2 mg by mouth 4 (four) times daily as needed.    MAGNESIUM OXIDE (MAG-OX) 400 MG (241.3 MG MAGNESIUM) TABLET    Take 2 tablets (800 mg total) by mouth 2 (two) times daily.    PANTOPRAZOLE (PROTONIX) 40 MG TABLET    Take 1 tablet (40 mg total) by mouth once daily.    POSACONAZOLE (NOXAFIL) 100 MG TBEC TABLET    Take 3 tablets (300 mg total) by mouth once daily.    ROPINIROLE (REQUIP) 0.5 MG  TABLET    Take 1 tablet (0.5 mg total) by mouth every evening.    TRAMADOL (ULTRAM) 50 MG TABLET    Take 1 tablet (50 mg total) by mouth every 6 (six) hours as needed for Pain.    ZOLPIDEM (AMBIEN) 5 MG TAB    Take 1 tablet (5 mg total) by mouth nightly as needed (insomnia).   Changed and/or Refilled Medications    Modified Medication Previous Medication    TACROLIMUS (PROGRAF) 0.5 MG CAP tacrolimus (PROGRAF) 0.5 MG Cap       Take 1 capsule (0.5 mg total) by mouth every morning.    Take 1 capsule (0.5 mg total) by mouth every morning AND 1 capsule (0.5 mg total) every evening.   Discontinued Medications    SULFAMETHOXAZOLE-TRIMETHOPRIM 800-160MG (BACTRIM DS) 800-160 MG TAB    Take 1 tablet by mouth every Mon, Wed, Fri. START 10/21/24       ALLERGIES:   Review of patient's allergies indicates:  No Known Allergies    GVHD Review of Systems:     Pertinent positives and negatives included in the HPI. Otherwise a 14 point review of systems is negative. GVHD review of systems recorded in BMT flowsheet.     Physical Exam:     Vitals:    12/26/24 0941   BP: (!) 84/52   Pulse:    Resp:    Temp:        General: Appears well, NAD.   HEENT: MMM, no OP lesions  Pulmonary: CTAB, no increased work of breathing, no W/R/C  Cardiovascular: S1S2 normal, RRR, no M/R/G  Abdominal: Soft, NT, ND, BS+, no HSM  Extremities: No C/C/E  Neurological: AAOx4, grossly normal, no focal deficits  Dermatologic: No appreciable rashes or lesions    ECOG Performance Status: (foot note - ECOG PS provided by Eastern Cooperative Oncology Group) 1 - Symptomatic but completely ambulatory    Karnofsky Performance Score:  80%- Normal Activity with Effort: Some Symptoms of Disease    Labs:   Lab Results   Component Value Date    WBC 1.59 (LL) 12/26/2024    HGB 11.4 (L) 12/26/2024    HCT 33.0 (L) 12/26/2024     (H) 12/26/2024    PLT 19 (LL) 12/26/2024        CMP  Sodium   Date Value Ref Range Status   12/26/2024 136 136 - 145 mmol/L Final     Potassium    Date Value Ref Range Status   12/26/2024 4.5 3.5 - 5.1 mmol/L Final     Chloride   Date Value Ref Range Status   12/26/2024 103 95 - 110 mmol/L Final     CO2   Date Value Ref Range Status   12/26/2024 23 23 - 29 mmol/L Final     Glucose   Date Value Ref Range Status   12/26/2024 163 (H) 70 - 110 mg/dL Final     BUN   Date Value Ref Range Status   12/26/2024 15 8 - 23 mg/dL Final     Creatinine   Date Value Ref Range Status   12/26/2024 1.2 0.5 - 1.4 mg/dL Final     Calcium   Date Value Ref Range Status   12/26/2024 9.0 8.7 - 10.5 mg/dL Final     Total Protein   Date Value Ref Range Status   12/26/2024 5.8 (L) 6.0 - 8.4 g/dL Final     Albumin   Date Value Ref Range Status   12/26/2024 3.3 (L) 3.5 - 5.2 g/dL Final     Total Bilirubin   Date Value Ref Range Status   12/26/2024 0.5 0.1 - 1.0 mg/dL Final     Comment:     For infants and newborns, interpretation of results should be based  on gestational age, weight and in agreement with clinical  observations.    Premature Infant recommended reference ranges:  Up to 24 hours.............<8.0 mg/dL  Up to 48 hours............<12.0 mg/dL  3-5 days..................<15.0 mg/dL  6-29 days.................<15.0 mg/dL       Alkaline Phosphatase   Date Value Ref Range Status   12/26/2024 62 40 - 150 U/L Final     AST   Date Value Ref Range Status   12/26/2024 19 10 - 40 U/L Final     ALT   Date Value Ref Range Status   12/26/2024 29 10 - 44 U/L Final     Anion Gap   Date Value Ref Range Status   12/26/2024 10 8 - 16 mmol/L Final     eGFR   Date Value Ref Range Status   12/26/2024 >60.0 >60 mL/min/1.73 m^2 Final          Imaging:   Hospital imaging reviewed.    Pathology:  Prior pathology reviewed. Plan for day +30 bone marrow biopsy on 10/23/24    Acute GVHD Scoring:  GVHD Acute Assessment      No GVHD at this time.               Assessment and Plan:   Guillaume AmayaBetsy Brad (Guillaume) is a pleasant 69 y.o.male with a past medical history of MDS s/p haplo SCT who presents  for post-transplant follow up.    MDS: Status post treatment with azacitidine 75 mg/m2 daily x7 days plus venetoclax 100mg (voriconazole) daily x14 of 28 days.Venetoclax decreased to 7 days with cycle 3 until completion of 11 cycles. No plans for maintenance at this time.     Status post allogeneic stem cell transplantation: As noted above, status post haploidentical stem cell transplantation conditioned with FluMel 100 + PTCy+ 2Gy TBI . Currently Day+ 98. Engrafted on 10/09/24 day +20.  Day 30 bone marrow (11/5/2024) showing mildly hypercellular marrow with no increased blast (0.3%) and no evidence of myeloid neoplasm. Pending chimerisms, NGS, and CG  Day 100 bone marrow biopsy on 12/31/24  Plan for central line removal in January with gen surg     3.    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 He started budesonide 3mg TID on 10/28/24. Due to persistent nausea despite budesonide so started systemic steroids 11/1 at 0.5 mg/kg and his steroid taper has been given to him. Steroid taper completed 12/8/24. No evidence of aGVHD today.   Current tacro dose: 1 capsule (0.5 mg) in the morning and 1 capsules (0.5 mg) in the evening.   Last tacro level: 13.2  Adjustments: Reduce to 0.5 mg daily    4.     Immunosuppression: Prevention with posaconazole, acyclovir. CMV prophylaxis with letermovir. PJP prophyalxis atovaquone. Continue weekly monitoring of CMV and EBV.  Last CMV: Not-detected,   last EBV: 4665 IU/ml (12/23/2024) - obtain PET/CT asap to evaluate for PTLD   Active infections: N/A    Pancytopenia: Due to underlying disease and chemotherapy. Transfuse for Hgb <7 g/dL and platelets <10k. Folate and copper deficient, on supplementation. Will continue to monitor to see if platelets begin to rise with his supplements. Promacta  sent in on 12/9/24. Working on aditya for financial assistance for promacta. Change TMP/SMX to atovaquone. Will eval additionally on upcoming bone marrow biopsy  Folic Acid deficiency: Continue folic acid 1mg daily  Copper deficiency: Copper level of 635, continue copper gluconate 2mg daily     Hypomagnesemia: Related to tacro, poor po intake. Continue MagOx to 800mg twice daily.      Chemotherapy Induced Nausea: Resolved with steroids. Tapered budesonide. Patient has his taper in Turkish.   Acute kidney injury, hypotension: Planned IV fluids after clinic today, but patient presented to emergency department     Anxiety, Restless Leg Syndrome: Noted since discharge by family. He started ropinirole 0.5mg and has experienced significant improvement.    Insomnia: Patient with significant improvement. Still wakes up a couple of times during the night but feels well rested in the mornings. Continue Ambien and Ropinirole for RLS.     Follow up: Twice weekly follow up with labs.    Orders Placed:      Orders Placed This Encounter    Ambulatory referral/consult to General Surgery    atovaquone (MEPRON) 750 mg/5 mL Susp oral liquid    ondansetron (ZOFRAN-ODT) 8 MG TbDL       Medical Complexity:   Visit today included increased complexity associated with the care of the episodic problems addressed above and managing the longitudinal care of the patient due to the serious and/or complex managed problem(s) history of allo SCT.      Follow Up:      Twice weekly follow up as scheduled.       A total of 40 minutes was spent in pre-visit chart review, personal interpretation of labs and imaging, and medication review. Total visit time 40 minutes, >50 % counseling.

## 2024-12-26 NOTE — SUBJECTIVE & OBJECTIVE
Past Medical History:   Diagnosis Date    Anticoagulant long-term use     Coronary artery disease     Hypertension     Myelodysplastic syndrome     Peripheral vascular disease, unspecified        Past Surgical History:   Procedure Laterality Date    BONE MARROW BIOPSY Left 2023    Procedure: Biopsy-bone marrow;  Surgeon: Harry Diamond MD;  Location: [facility] OR;  Service: Oncology;  Laterality: Left;    COLONOSCOPY N/A 2022    Procedure: COLONOSCOPY Golytely Vaccinated will bring cards;  Surgeon: Dereje Simon MD;  Location: [facility] ENDO;  Service: Endoscopy;  Laterality: N/A;  Do not cancel this order    INSERTION OF LEMONS CATHETER Right 2024    Procedure: INSERTION, CATHETER, CENTRAL VENOUS, LEMONS TRIPLE LUMEN;  Surgeon: Kg Patten MD;  Location: [address];  Service: General;  Laterality: Right;       Review of patient's allergies indicates:  No Known Allergies    Family History       Problem Relation (Age of Onset)    Cancer Father, Brother    Hypertension Mother    No Known Problems Daughter, Daughter, Son          Tobacco Use    Smoking status: Former     Current packs/day: 0.00     Average packs/day: 0.3 packs/day for 50.0 years (12.5 ttl pk-yrs)     Types: Cigarettes     Start date: 3/1/1973     Quit date: 3/1/2023     Years since quittin.8     Passive exposure: Past    Smokeless tobacco: Never   Substance and Sexual Activity    Alcohol use: Not Currently    Drug use: Never    Sexual activity: Not Currently     Partners: Female      Review of Systems   All other systems reviewed and are negative.    Objective:     Vital Signs (Most Recent):  Temp: 100.3 °F (37.9 °C) (24 1231)  Pulse: 80 (24 1530)  Resp: 20 (24 1530)  BP: 107/65 (24 1530)  SpO2: 96 % (24 1530) Vital Signs (24h Range):  Temp:  [98.3 °F (36.8 °C)-100.3 °F (37.9 °C)] 100.3 °F (37.9 °C)  Pulse:  [] 80  Resp:  [16-22] 20  SpO2:  [96 %-99 %] 96 %  BP: ()/(45-65)  107/65   Weight: 63 kg (138 lb 14.2 oz)  Body mass index is 20.51 kg/m².      Intake/Output Summary (Last 24 hours) at 12/26/2024 1545  Last data filed at 12/26/2024 1353  Gross per 24 hour   Intake 1350.86 ml   Output --   Net 1350.86 ml          Physical Exam  Vitals and nursing note reviewed.   Constitutional:       General: He is not in acute distress.     Appearance: He is normal weight. He is not toxic-appearing.   HENT:      Head: Normocephalic and atraumatic.      Nose: Nose normal.      Mouth/Throat:      Mouth: Mucous membranes are moist.   Eyes:      General: No scleral icterus.     Extraocular Movements: Extraocular movements intact.   Cardiovascular:      Rate and Rhythm: Normal rate and regular rhythm.      Pulses: Normal pulses.      Heart sounds: Normal heart sounds.      Comments: Tunneled central line in place to right anterior chest. Clean and dressing intact. No erythema nor tenderness noted  Pulmonary:      Effort: Pulmonary effort is normal.      Breath sounds: Normal breath sounds.   Abdominal:      Palpations: Abdomen is soft.      Tenderness: There is no abdominal tenderness.   Musculoskeletal:      Cervical back: Normal range of motion and neck supple.      Right lower leg: No edema.      Left lower leg: No edema.   Skin:     General: Skin is warm and dry.   Neurological:      General: No focal deficit present.      Mental Status: He is alert and oriented to person, place, and time. Mental status is at baseline.            Vents:     Lines/Drains/Airways       Central Venous Catheter Line  Duration             Tunneled Central Line - Triple Lumen 09/13/24 1625 Internal Jugular Right 104 days              Peripheral Intravenous Line  Duration                  Peripheral IV - Single Lumen 12/26/24 1120 20 G Anterior;Distal;Left Upper Arm <1 day                  Significant Labs:    CBC/Anemia Profile:  Recent Labs   Lab 12/26/24  0843   WBC 1.59*   HGB 11.4*   HCT 33.0*   PLT 19*   *    RDW 17.0*        Chemistries:  Recent Labs   Lab 12/26/24  0843      K 4.5      CO2 23   BUN 15   CREATININE 1.2   CALCIUM 9.0   ALBUMIN 3.3*   PROT 5.8*   BILITOT 0.5   ALKPHOS 62   ALT 29   AST 19   MG 1.6   PHOS 2.7       All pertinent labs within the past 24 hours have been reviewed.    Significant Imaging: I have reviewed all pertinent imaging results/findings within the past 24 hours.    X-Ray Chest AP Portable  Narrative: EXAMINATION:  XR CHEST AP PORTABLE    CLINICAL HISTORY:  Sepsis;    TECHNIQUE:  Single frontal view of the chest was performed.    COMPARISON:  10/14/2024    FINDINGS:  There is a persistent improving or recurrent infiltrate at the left lung base with a small pleural effusion.  There is linear atelectasis at the right lung base.    Stable central venous catheter tip overlying cavoatrial junction without pneumothorax.    Cardiomediastinal contours approximately stable accounting for different projection.  Impression: See above    Electronically signed by: Regis Calabrese  Date:    12/26/2024  Time:    13:07

## 2024-12-26 NOTE — ASSESSMENT & PLAN NOTE
Due to underlying disease and chemotherapy. Transfuse for Hgb <7 g/dL and platelets <10k. Folate and copper deficient, on supplementation. Will continue to monitor to see if platelets begin to rise with his supplements. Promacta sent in on 12/9/24. Working on aditya for financial assistance for promacta. Changed TMP/SMX to atovaquone.   Folic Acid deficiency: Continue folic acid 1mg daily  Copper deficiency: Copper level of 635, continue copper gluconate 2mg daily

## 2024-12-26 NOTE — CONSULTS
[facility] - Emergency Dept  Critical Care Medicine  Consult Note    Patient Name: Guillaume Salinas  MRN: [id number]  Admission Date: 12/26/2024  Hospital Length of Stay: 0 days  Code Status: Full Code  Attending Physician: Erika Lares MD   Primary Care Provider: Kal Hargrove MD   Principal Problem: <principal problem not specified>    Inpatient consult to Critical Care Medicine  Consult performed by: Bj Campos MD  Consult ordered by: Mayuri Guerra MD        Subjective:     HPI:  69 year old with MDS s/p stem cell transplantation almost 100 days ago on immunosuppression who presented to [facility] ED with complaints of lightheadedness and weakness over the past few days. The patient was seen in BMT clinic this AM for a regular visit and was noted to be hypotensive and patient presented to the ED for evaluation. Interpretor services offered to patient and he declined, preferring for his son, who is at the bedside, interpret for him. The patient noted decreased PO intake over the past few days and 2-3 episodes of watery diarrhea over the same time. He denies nausea, vomiting, fevers, or chills. He denies sick contacts or any other symptoms. He takes carvedilol for hypertension at home and took his regularly scheduled dose this AM. He has no other complaints today.      Hospital/ICU Course:  No notes on file    Past Medical History:   Diagnosis Date    Anticoagulant long-term use     Coronary artery disease     Hypertension     Myelodysplastic syndrome     Peripheral vascular disease, unspecified        Past Surgical History:   Procedure Laterality Date    BONE MARROW BIOPSY Left 4/26/2023    Procedure: Biopsy-bone marrow;  Surgeon: Harry Diamond MD;  Location: [facility] OR;  Service: Oncology;  Laterality: Left;    COLONOSCOPY N/A 01/05/2022    Procedure: COLONOSCOPY Golytely Vaccinated will bring cards;  Surgeon: Dereje Simon MD;  Location: [facility] ENDO;  Service: Endoscopy;  Laterality: N/A;   Do not cancel this order    INSERTION OF LEMONS CATHETER Right 2024    Procedure: INSERTION, CATHETER, CENTRAL VENOUS, LEMONS TRIPLE LUMEN;  Surgeon: Kg Patten MD;  Location: [facility] OR [address] [place] [address];  Service: General;  Laterality: Right;       Review of patient's allergies indicates:  No Known Allergies    Family History       Problem Relation (Age of Onset)    Cancer Father, Brother    Hypertension Mother    No Known Problems Daughter, Daughter, Son          Tobacco Use    Smoking status: Former     Current packs/day: 0.00     Average packs/day: 0.3 packs/day for 50.0 years (12.5 ttl pk-yrs)     Types: Cigarettes     Start date: 3/1/1973     Quit date: 3/1/2023     Years since quittin.8     Passive exposure: Past    Smokeless tobacco: Never   Substance and Sexual Activity    Alcohol use: Not Currently    Drug use: Never    Sexual activity: Not Currently     Partners: Female      Review of Systems   All other systems reviewed and are negative.    Objective:     Vital Signs (Most Recent):  Temp: 100.3 °F (37.9 °C) (24 1231)  Pulse: 80 (24 1530)  Resp: 20 (24 1530)  BP: 107/65 (24 1530)  SpO2: 96 % (24 1530) Vital Signs (24h Range):  Temp:  [98.3 °F (36.8 °C)-100.3 °F (37.9 °C)] 100.3 °F (37.9 °C)  Pulse:  [] 80  Resp:  [16-22] 20  SpO2:  [96 %-99 %] 96 %  BP: ()/(45-65) 107/65   Weight: 63 kg (138 lb 14.2 oz)  Body mass index is 20.51 kg/m².      Intake/Output Summary (Last 24 hours) at 2024 1545  Last data filed at 2024 1353  Gross per 24 hour   Intake 1350.86 ml   Output --   Net 1350.86 ml          Physical Exam  Vitals and nursing note reviewed.   Constitutional:       General: He is not in acute distress.     Appearance: He is normal weight. He is not toxic-appearing.   HENT:      Head: Normocephalic and atraumatic.      Nose: Nose normal.      Mouth/Throat:      Mouth: Mucous membranes are moist.   Eyes:      General: No scleral icterus.      Extraocular Movements: Extraocular movements intact.   Cardiovascular:      Rate and Rhythm: Normal rate and regular rhythm.      Pulses: Normal pulses.      Heart sounds: Normal heart sounds.      Comments: Tunneled central line in place to right anterior chest. Clean and dressing intact. No erythema nor tenderness noted  Pulmonary:      Effort: Pulmonary effort is normal.      Breath sounds: Normal breath sounds.   Abdominal:      Palpations: Abdomen is soft.      Tenderness: There is no abdominal tenderness.   Musculoskeletal:      Cervical back: Normal range of motion and neck supple.      Right lower leg: No edema.      Left lower leg: No edema.   Skin:     General: Skin is warm and dry.   Neurological:      General: No focal deficit present.      Mental Status: He is alert and oriented to person, place, and time. Mental status is at baseline.            Vents:     Lines/Drains/Airways       Central Venous Catheter Line  Duration             Tunneled Central Line - Triple Lumen 09/13/24 1625 Internal Jugular Right 104 days              Peripheral Intravenous Line  Duration                  Peripheral IV - Single Lumen 12/26/24 1120 20 G Anterior;Distal;Left Upper Arm <1 day                  Significant Labs:    CBC/Anemia Profile:  Recent Labs   Lab 12/26/24  0843   WBC 1.59*   HGB 11.4*   HCT 33.0*   PLT 19*   *   RDW 17.0*        Chemistries:  Recent Labs   Lab 12/26/24  0843      K 4.5      CO2 23   BUN 15   CREATININE 1.2   CALCIUM 9.0   ALBUMIN 3.3*   PROT 5.8*   BILITOT 0.5   ALKPHOS 62   ALT 29   AST 19   MG 1.6   PHOS 2.7       All pertinent labs within the past 24 hours have been reviewed.    Significant Imaging: I have reviewed all pertinent imaging results/findings within the past 24 hours.    X-Ray Chest AP Portable  Narrative: EXAMINATION:  XR CHEST AP PORTABLE    CLINICAL HISTORY:  Sepsis;    TECHNIQUE:  Single frontal view of the chest was  "performed.    COMPARISON:  10/14/2024    FINDINGS:  There is a persistent improving or recurrent infiltrate at the left lung base with a small pleural effusion.  There is linear atelectasis at the right lung base.    Stable central venous catheter tip overlying cavoatrial junction without pneumothorax.    Cardiomediastinal contours approximately stable accounting for different projection.  Impression: See above    Electronically signed by: Regis Calabrese  Date:    12/26/2024  Time:    13:07        ABG  No results for input(s): "PH", "PO2", "PCO2", "HCO3", "BE" in the last 168 hours.  Assessment/Plan:     Cardiac/Vascular  Hypotension  Patient with a few days of dizziness and fatigue on background of bone marrow transplant on immunosuppression roughly 100 days ago. Presenting with fever and borderline blood pressures. Had an appropriate response to 1L of IVF however BP remained low following that. Additional liter given and critical care consulted for hypotension. Patient febrile and has complaints of diarrhea for the past few days. No sick contacts and no abx outside of prophylaxis. CXR without focal consolidation. Blood cultures drawn and in process. Received vancomycin and zosyn in the ED.     - Patient with appropriate response to volume resuscitation in the ED. Negative lactic. At this time stable for BMT floor.  - RIP ordered given diarrheal illness.  - C.diff ordered already  - Would continue broad spectrum antibiotics pending RIP and blood culture data  - Patient has had poor PO intake for days and reports feeling much better after volume resuscitation, would encourage PO intake    Hypertension, essential  Hold home antihypertensives in setting of hypotension           Critical care was time spent personally by me on the following activities: development of treatment plan with patient or surrogate and bedside caregivers, discussions with consultants, evaluation of patient's response to treatment, examination of " patient, ordering and performing treatments and interventions, ordering and review of laboratory studies, ordering and review of radiographic studies, pulse oximetry, re-evaluation of patient's condition. This critical care time did not overlap with that of any other provider or involve time for any procedures.    Thank you for your consult. Pulm/Crit will sign off. Please do not hesitate to reach out if you have any further concerns regarding this patient or we can be of any help in their care.     Bj Campos MD  Critical Care Medicine  [facility] - Emergency Dept

## 2024-12-26 NOTE — SUBJECTIVE & OBJECTIVE
Patient information was obtained from patient and ER records.     Oncology History:     Primary oncologist: Paco Hickey MD  Primary oncologic diagnosis: MDS  Transplant date: 9/19/2024  Donor: haploidentical  Blood Type (Patient): B +  Blood Type (Donor): A +  CMV (Patient): Positive  CMV (Donor): Positive  Graft source: Bone marrow  CD34+ cell dose: 3.55x10^6  Conditioning Regimen: Fludarabine plus melphalan 100 mg/m2 + 2Gy TBI  GVHD prophylaxis: Post-transplant cyclophosphamide, Tacrolimus, MMF  Immediate post-transplant complications: Patient experienced expected GI toxicities with C diff negative diarrhea and nausea, neutropenic fever with negative infectious work up, expected cytopenias requiring transfusions, electrolyte abnormalities requiring replacement, volume overload requiring diuresis, intermittent SOB from pulmonary edema requiring 02, and a hemorrhoid flare up. Diarrhea and SOB improved towards the end of the hospital stay.        (Not in a hospital admission)      Patient has no known allergies.     Past Medical History:   Diagnosis Date    Anticoagulant long-term use     Coronary artery disease     Hypertension     Myelodysplastic syndrome     Peripheral vascular disease, unspecified      Past Surgical History:   Procedure Laterality Date    BONE MARROW BIOPSY Left 4/26/2023    Procedure: Biopsy-bone marrow;  Surgeon: Harry Diamond MD;  Location: [facility] OR;  Service: Oncology;  Laterality: Left;    COLONOSCOPY N/A 01/05/2022    Procedure: COLONOSCOPY Golytely Vaccinated will bring cards;  Surgeon: Dereje Simon MD;  Location: [facility];  Service: Endoscopy;  Laterality: N/A;  Do not cancel this order    INSERTION OF LEMONS CATHETER Right 9/13/2024    Procedure: INSERTION, CATHETER, CENTRAL VENOUS, LEMONS TRIPLE LUMEN;  Surgeon: Kg Patten MD;  Location: [address];  Service: General;  Laterality: Right;     Family History       Problem Relation (Age of Onset)     Cancer Father, Brother    Hypertension Mother    No Known Problems Daughter, Daughter, Son          Tobacco Use    Smoking status: Former     Current packs/day: 0.00     Average packs/day: 0.3 packs/day for 50.0 years (12.5 ttl pk-yrs)     Types: Cigarettes     Start date: 3/1/1973     Quit date: 3/1/2023     Years since quittin.8     Passive exposure: Past    Smokeless tobacco: Never   Substance and Sexual Activity    Alcohol use: Not Currently    Drug use: Never    Sexual activity: Not Currently     Partners: Female       Review of Systems   Constitutional:  Positive for appetite change. Negative for chills and fever.   HENT:  Negative for congestion and sore throat.    Respiratory:  Negative for cough and shortness of breath.    Cardiovascular:  Negative for chest pain, palpitations and leg swelling.   Gastrointestinal:  Positive for diarrhea. Negative for abdominal pain, nausea and vomiting.   Genitourinary:  Positive for frequency. Negative for dysuria.   Musculoskeletal:  Negative for back pain and myalgias.   Neurological:  Positive for weakness and light-headedness.   Psychiatric/Behavioral:  Negative for agitation and confusion.      Objective:     Vital Signs (Most Recent):  Temp: 100.3 °F (37.9 °C) (24 1231)  Pulse: 80 (24 1530)  Resp: 20 (24 1530)  BP: 107/65 (24 1530)  SpO2: 96 % (24 1530) Vital Signs (24h Range):  Temp:  [98.3 °F (36.8 °C)-100.3 °F (37.9 °C)] 100.3 °F (37.9 °C)  Pulse:  [] 80  Resp:  [16-22] 20  SpO2:  [96 %-99 %] 96 %  BP: ()/(45-65) 107/65     Weight: 63 kg (138 lb 14.2 oz)  Body mass index is 20.51 kg/m².  Body surface area is 1.75 meters squared.    ECOG SCORE           [unfilled]    Lines/Drains/Airways       Central Venous Catheter Line  Duration             Tunneled Central Line - Triple Lumen 24 1625 Internal Jugular Right 104 days              Peripheral Intravenous Line  Duration                  Peripheral IV - Single Lumen  12/26/24 1120 20 G Anterior;Distal;Left Upper Arm <1 day                     Physical Exam  Constitutional:       Appearance: Normal appearance.   HENT:      Head: Normocephalic and atraumatic.   Eyes:      Extraocular Movements: Extraocular movements intact.      Pupils: Pupils are equal, round, and reactive to light.   Cardiovascular:      Rate and Rhythm: Normal rate and regular rhythm.   Pulmonary:      Effort: Pulmonary effort is normal.      Breath sounds: Normal breath sounds.   Abdominal:      General: Abdomen is flat.      Palpations: Abdomen is soft.   Musculoskeletal:         General: No swelling. Normal range of motion.      Cervical back: Normal range of motion. No rigidity.   Skin:     General: Skin is warm and dry.   Neurological:      General: No focal deficit present.      Mental Status: He is alert and oriented to person, place, and time.   Psychiatric:         Mood and Affect: Mood normal.         Behavior: Behavior normal.            Significant Labs:   CBC:   Recent Labs   Lab 12/26/24  0843   WBC 1.59*   HGB 11.4*   HCT 33.0*   PLT 19*    and CMP:   Recent Labs   Lab 12/26/24  0843      K 4.5      CO2 23   *   BUN 15   CREATININE 1.2   CALCIUM 9.0   PROT 5.8*   ALBUMIN 3.3*   BILITOT 0.5   ALKPHOS 62   AST 19   ALT 29   ANIONGAP 10       Diagnostic Results:  I have reviewed all pertinent imaging results/findings within the past 24 hours.

## 2024-12-26 NOTE — PROGRESS NOTES
The  contacted [employer] Patient Assistance  @ [phone] Program to check the status of the delivery of the patient's prescription of Promacta. The refill request was made on 12/16/2024 by the patient's family.  The [facility] representative checked on the request and could see that the request was made on the date stated, and that the prescription is still in process. The representative will expedite the request.  The  completed the renewal form for Promacta for the 2025 prescription.  The  completed the 2025 application to [employer] for the patient's 2025 prescription for Prevyrmis.

## 2024-12-26 NOTE — ASSESSMENT & PLAN NOTE
Status post haploidentical stem cell transplantation conditioned with FluMel 100 + PTCy+ 2Gy TBI . Currently Day+ 98. Engrafted on 10/09/24 day +20.  Day 30 bone marrow (11/5/2024) showing mildly hypercellular marrow with no increased blast (0.3%) and no evidence of myeloid neoplasm. Pending chimerisms, NGS, and CG  Day 100 bone marrow biopsy on 12/31/24  Plan for central line removal in January with gen surg

## 2024-12-26 NOTE — ASSESSMENT & PLAN NOTE
GVHD prophylaxis with Post-transplant cyclophosphamide, Tacrolimus, MMF (MMF d/c on D+35). 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 He started budesonide 3mg TID on 10/28/24. Due to persistent nausea despite budesonide so started systemic steroids 11/1 at 0.5 mg/kg and his steroid taper has been given to him. Steroid taper completed 12/8/24. Tacro dose: 1 capsule (0.5 mg) in the morning and 1 capsules (0.5 mg) in the evening until 12/26/24.  - Last tacro level: 13.2  - Adjustments: Reduce tacro to 0.5 mg daily

## 2024-12-26 NOTE — ASSESSMENT & PLAN NOTE
Status post treatment with azacitidine 75 mg/m2 daily x7 days plus venetoclax 100mg (voriconazole) daily x14 of 28 days.Venetoclax decreased to 7 days with cycle 3 until completion of 11 cycles. No plans for maintenance at this time.

## 2024-12-26 NOTE — ED NOTES
Patient comes into the emergency department by POV after being seen for his weekly appointment at the [facility] with complaints of hypotension and dizzness. Patient's daughter states that he has also been increasingly tired over the past few days. Daughter states his systolic was in the 80s this morning. Pt has a history of BMT and had a bone marrow transplant about 100 days ago.

## 2024-12-26 NOTE — ASSESSMENT & PLAN NOTE
He was seen in clinic by Dr. Kruse, his hematologist 12/26 in clinic and was noted to have pain in shoulders/heavy weight which he attributed to anemia, leg weakness, loss of appetite, and 2 days of mild to moderate diarrhea. Loperamide helped. He has not had fever, chills, abdominal pain, cough, sore throat, SOB, CP, or urinary discomfort. He was meant to get IV fluids after clinic today due to mild ADDISON (sCr 1.2, baseline 0.6-0.9) however presented to ED instead due to symptoms. He was started on vanc/Zosyn and received 1L IVF with improvement in BP from 73/45 to 95/60. Patient seen at bedside and feeling much better. He states that he has not had a lot to eat or drink for several days due to worsening appetite. Patient had another episode of hypotension 72/45 with improvement again to 97/56 with 1L IVF. MICU evaluated and did not take due to improvement in BP. CXR with persistent improving or recurrent infiltrate at the left lung base with a small pleural effusion. UA pending. Bcx sent.    Etiology of hypotension is likely decreased oral intake from poor appetite over last few days. Although he has mild diarrhea, he has not been febrile and denies chills, urinary discomfort (although has some frequency), SOB, cough, abdominal pain. Although CXR is difficult to determine if he has improving vs. recurrent infiltrate, he has no pulmonary symptoms and lungs are clear to auscultation. Will monitor off antibiotics for now.   - discontinue vanc/Zosyn  - f/u C. diff  - f/u stool studies  - f/u UA  - f/u BCx

## 2024-12-26 NOTE — PROGRESS NOTES
BMT Pharmacist Immunosuppression Note:    Reviewed tacrolimus level with Dr. Kruse and it is above goal range. Instructed patient to decrease your current regimen of 1 capsules (0.5mg) in the morning and 1 capsules (0.5mg) in the evening to 1 capsules (0.5mg) in the morning.       Thank you,   Sanjuana Poe, Pharm.D., BCOP  Clinical Pharmacy Specialist, Bone Marrow Transplant/Cellular Therapy  [facility]  SpectraLink: [id number]

## 2024-12-26 NOTE — ASSESSMENT & PLAN NOTE
Patient with a few days of dizziness and fatigue on background of bone marrow transplant on immunosuppression roughly 100 days ago. Presenting with fever and borderline blood pressures. Had an appropriate response to 1L of IVF however BP remained low following that. Additional liter given and critical care consulted for hypotension. Patient febrile and has complaints of diarrhea for the past few days. No sick contacts and no abx outside of prophylaxis. CXR without focal consolidation. Blood cultures drawn and in process. Received vancomycin and zosyn in the ED.     - Patient with appropriate response to volume resuscitation in the ED. Negative lactic. At this time stable for BMT floor.  - RIP ordered given diarrheal illness.  - C.diff ordered already  - Would continue broad spectrum antibiotics pending RIP and blood culture data  - Patient has had poor PO intake for days and reports feeling much better after volume resuscitation, would encourage PO intake

## 2024-12-26 NOTE — ED PROVIDER NOTES
Encounter Date: 12/26/2024       History     Chief Complaint   Patient presents with    Fatigue     Bone marrow transplant 3 months ago. Increased fatigue/nausea/diarrhea. Hypotensive in clinic.      The history is provided by the patient and a relative. The history is limited by a language barrier. A  was used (Daughter).   69-year-old male past medical history of MDS who is status post haploidentical stem cell transplantation conditioned with FluMel 100 + PTCy+ 2Gy TBI who is currently day +98  presents to emergency department with chief complaint of weakness, fatigue, lightheadedness.  The patient was seen earlier today at his oncology appointment.  He was with his daughter and heading towards his next pulmonary appointment when they decided to come to the emergency department because he was feeling weak and fatigue.  The daughter states that he was supposed to get an IV fluid infusion.  The patient also endorses decreased appetite, diarrhea for the last 2 days, nausea.  Denies any chest pain, shortness of breath, abdominal pain, dysuria, headaches.    Review of patient's allergies indicates:  No Known Allergies  Past Medical History:   Diagnosis Date    Anticoagulant long-term use     Coronary artery disease     Hypertension     Myelodysplastic syndrome     Peripheral vascular disease, unspecified      Past Surgical History:   Procedure Laterality Date    BONE MARROW BIOPSY Left 4/26/2023    Procedure: Biopsy-bone marrow;  Surgeon: Harry Diamond MD;  Location: [facility] OR;  Service: Oncology;  Laterality: Left;    COLONOSCOPY N/A 01/05/2022    Procedure: COLONOSCOPY Golytely Vaccinated will bring cards;  Surgeon: Dereje Simon MD;  Location: [facility] ENDO;  Service: Endoscopy;  Laterality: N/A;  Do not cancel this order    INSERTION OF LEMONS CATHETER Right 9/13/2024    Procedure: INSERTION, CATHETER, CENTRAL VENOUS, LEMONS TRIPLE LUMEN;  Surgeon: Kg Patten MD;  Location:  [facility] OR 2ND FLR;  Service: General;  Laterality: Right;     Family History   Problem Relation Name Age of Onset    Hypertension Mother      Cancer Father      Cancer Brother 9     No Known Problems Daughter      No Known Problems Daughter      No Known Problems Son       Social History     Tobacco Use    Smoking status: Former     Current packs/day: 0.00     Average packs/day: 0.3 packs/day for 50.0 years (12.5 ttl pk-yrs)     Types: Cigarettes     Start date: 3/1/1973     Quit date: 3/1/2023     Years since quittin.8     Passive exposure: Past    Smokeless tobacco: Never   Substance Use Topics    Alcohol use: Not Currently    Drug use: Never       Physical Exam     Initial Vitals   BP Pulse Resp Temp SpO2   24 1115 24 1111 24 1111 24 1111 24 1111   (!) 73/45 105 (!) 22 99.7 °F (37.6 °C) 98 %      MAP       --                Physical Exam  Physical Exam:  CONSTITUTIONAL:  Ill-appearing, in no acute distress.  HENT: Normocephalic, atraumatic    EYES: Sclerae anicteric   NECK: Supple, no thyroid enlargement  CARDIOVASCULAR: Regular rate and rhythm without any murmurs, gallops, rubs.  RESPIRATORY: Speaking in full sentences. Breathing comfortably. Auscultation of the lungs revealed normal breath sounds b/l, no wheezing, no rales, no rhonchi.  ABDOMEN: Soft and nontender, no masses, no rebound or guarding   NEUROLOGIC: Alert, interacting normally. No facial droop. Voice is clear. Speech is fluent.  MSK: Moving all four extremities.  Central venous catheter on the right which is clean, dry intact.  SKIN: Warm and dry. No visible rash on exposed areas of skin.    Psych: Mood and affect normal.       ED Course   Procedures  Labs Reviewed   INFLUENZA A & B BY MOLECULAR       Result Value    Influenza A, Molecular Negative      Influenza B, Molecular Negative      Flu A & B Source NP     RESPIRATORY INFECTION PANEL (PCR), NASOPHARYNGEAL    Respiratory Infection Panel Source NP Swab       Narrative:     Assay not valid for lower respiratory specimens, alternate  testing required.   CULTURE, BLOOD   CULTURE, BLOOD   CULTURE, STOOL   CLOSTRIDIUM DIFFICILE   URINALYSIS, REFLEX TO URINE CULTURE   TACROLIMUS LEVEL   TYPE & SCREEN    Group & Rh B POS      Indirect [place] NEG      Specimen Outdate 12/29/2024 23:59     ISTAT LACTATE    POC Lactate 1.93      Sample VENOUS      Site Other      Allens Test N/A          ECG Results              EKG 12-lead (Final result)        Collection Time Result Time QRS Duration OHS QTC Calculation    12/26/24 11:28:08 12/26/24 12:24:16 70 447                     Final result by Interface, Lab In [facility] (12/26/24 12:24:21)                   Narrative:    Test Reason : Z13.6,    Vent. Rate :  97 BPM     Atrial Rate :  97 BPM     P-R Int : 130 ms          QRS Dur :  70 ms      QT Int : 352 ms       P-R-T Axes :  33 -21  43 degrees    QTcB Int : 447 ms    Normal sinus rhythm  Within normal limits    When compared with ECG of 18-Sep-2024 14:01,  T wave inversion no longer evident in Anterior leads  Confirmed by Lux Jackson ([id number]) on 12/26/2024 12:24:13 PM    Referred By:            Confirmed By: Lux Jackson                                  Imaging Results              X-Ray Chest AP Portable (Final result)  Result time 12/26/24 13:07:34      Final result by Regis Calabrese MD (12/26/24 13:07:34)                   Impression:      See above      Electronically signed by: Regis Calabrese  Date:    12/26/2024  Time:    13:07               Narrative:    EXAMINATION:  XR CHEST AP PORTABLE    CLINICAL HISTORY:  Sepsis;    TECHNIQUE:  Single frontal view of the chest was performed.    COMPARISON:  10/14/2024    FINDINGS:  There is a persistent improving or recurrent infiltrate at the left lung base with a small pleural effusion.  There is linear atelectasis at the right lung base.    Stable central venous catheter tip overlying cavoatrial junction without  pneumothorax.    Cardiomediastinal contours approximately stable accounting for different projection.                                       Medications   acyclovir tablet 800 mg (has no administration in time range)   atovaquone 750 mg/5 mL oral liquid 1,500 mg (has no administration in time range)   gabapentin capsule 600 mg (has no administration in time range)   letermovir Tab 480 mg (has no administration in time range)   ondansetron disintegrating tablet 8 mg (has no administration in time range)   pantoprazole EC tablet 40 mg (40 mg Oral Given 12/26/24 1501)   posaconazole EC tablet 300 mg (has no administration in time range)   rOPINIRole tablet 0.5 mg (has no administration in time range)   traMADoL tablet 50 mg (has no administration in time range)   zolpidem tablet 5 mg (has no administration in time range)   tacrolimus capsule 0.5 mg (0.5 mg Oral Given 12/26/24 1501)   levoFLOXacin tablet 500 mg (500 mg Oral Given 12/26/24 1501)   sodium chloride 0.9% flush 10 mL (has no administration in time range)   naloxone 0.4 mg/mL injection 0.02 mg (has no administration in time range)   glucose chewable tablet 16 g (has no administration in time range)   glucose chewable tablet 24 g (has no administration in time range)   dextrose 50% injection 12.5 g (has no administration in time range)   dextrose 50% injection 25 g (has no administration in time range)   glucagon (human recombinant) injection 1 mg (has no administration in time range)   potassium chloride SA CR tablet 20 mEq (has no administration in time range)   potassium chloride SA CR tablet 20 mEq (has no administration in time range)   potassium chloride SA CR tablet 20 mEq (has no administration in time range)   magnesium oxide tablet 400 mg (has no administration in time range)   magnesium oxide tablet 400 mg (has no administration in time range)   magnesium oxide tablet 800 mg (has no administration in time range)   k phos di & mono-sod phos mono 250  mg tablet 1 tablet (has no administration in time range)   k phos di & mono-sod phos mono 250 mg tablet 2 tablet (has no administration in time range)   lactated ringers bolus 1,000 mL (1,000 mLs Intravenous New Bag 12/26/24 1435)   lactated ringers bolus 1,000 mL (0 mLs Intravenous Stopped 12/26/24 1230)   acetaminophen tablet 1,000 mg (1,000 mg Oral Given 12/26/24 1231)     Medical Decision Making  69-year-old male with a past medical history of MDS status post bone marrow transplant presenting with weakness and fatigue.  On arrival to the emergency department patient's blood pressure is 73/45 . Satting normally. Afebrile.     Ddx includes sepsis (unknown source at this time.  The patient does have 2 day history of diarrhea, we will send stool studies), hypoalimentation, electrolyte abnormality, anemia.      This patient does have evidence of infective focus  My overall impression is sepsis.  Source: Unknown  Antibiotics given- Antibiotics (72h ago, onward)    Start     Stop Route Frequency Ordered    12/26/24 1130  piperacillin-tazobactam (ZOSYN) 4.5 g in D5W 100 mL IVPB   (MB+)  (ED Adult Sepsis Treatment)         12/27/24 1129 IV Every 8 hours (non-standard times) 12/26/24 1121    12/26/24 1130  vancomycin 1,500 mg in 0.9% NaCl 250 mL IVPB (admixture   device)  (ED Adult Sepsis Treatment)         -- IV Once 12/26/24 1121      Latest lactate reviewed-  Lab             12/26/24                       1133          POCLAC       1.93          Organ dysfunction indicated by MDS    Fluid challenge 1L    Post- resuscitation assessment Yes Perfusion exam was performed within 6 hours of septic shock presentation after bolus shows Adequate tissue perfusion assessed by non-invasive monitoring       Will Start Pressors- Levophed for MAP of 65  Source control achieved by: Broad spec antibiotics      Lactate normal.  Flu negative.  Pt was to be admitted to Oncology service but again became acutely hypotensive with a  blood pressure of 72/45.  He was given an additional unit of LR.  Critical care medicine was consulted and evaluated the patient. Pt's BP subsequently improved to high 90s to 100s systolic.  The patient will ultimately be admitted to the oncology service.      Amount and/or Complexity of Data Reviewed  Labs: ordered. Decision-making details documented in ED Course.  Radiology: ordered.    Risk  OTC drugs.  Decision regarding hospitalization.               ED Course as of 12/26/24 1517   Thu Dec 26, 2024   1238 POC Lactate: 1.93 [MK]   1426 Called by nurse.  Patient now hypotensive again.  Will give additional L of fluid but I have notified the BMT team and we have called the ICU team to come evaluate the patient. [AS]      ED Course User Index  [AS] Mayuri Guerra MD  [MK] Ariadna Fowler MD                 Medical Decision Making:   Clinical Tests:   Sepsis Perfusion Assessment: "I attest a sepsis perfusion exam was performed within 6 hours of sepsis, severe sepsis, or septic shock presentation, following fluid resuscitation."             Clinical Impression:  Final diagnoses:  [Z13.6] Screening for cardiovascular condition  [A41.9] Sepsis, due to unspecified organism, unspecified whether acute organ dysfunction present (Primary)  [E86.1] Hypotension due to hypovolemia          ED Disposition Condition    Observation                 Ariadna Fowler MD  Resident  12/26/24 1517

## 2024-12-26 NOTE — NURSING
Labs drawn from CVC.  Sterile dressing change to R chest wall CVC.  Site WNL.  Pt tolerated.

## 2024-12-26 NOTE — HPI
Guillaume Salinas (Guillaume) is a pleasant 69 y.o.male with a past medical history of MDS who is status post haploidentical stem cell transplantation conditioned with FluMel 100 + PTCy+ 2Gy TBI who is currently day +98 who presents to the ED with weakness and lightheadedness, found to have hypotension. He was seen in clinic by Dr. Kruse, his hematologist this morning in clinic and was noted to have pain in shoulders/heavy weight which he attributes to anemia, leg weakness, loss of appetite, and 2 days of mild to moderate diarrhea. Loperamide helped. He has not had fever, chills, abdominal pain, cough, sore throat, SOB, CP, or urinary discomfort. He was meant to get IV fluids after clinic today due to mild ADDISON (sCr 1.2, baseline 0.6-0.9) however presented to ED instead due to symptoms. He was started on vanc/Zosyn and received 1L IVF with improvement in BP from 73/45 to 95/60. Patient seen at bedside and feeling much better. He states that he has not had a lot to eat or drink for several days due to worsening appetite.     Patient had another episode of hypotension 72/45 with improvement again to 97/56 with 1L IVF. MICU evaluated and did not take due to improvement in BP. CXR with persistent improving or recurrent infiltrate at the left lung base with a small pleural effusion. UA pending. Bcx sent. , plt 19, Hgb 11.4.

## 2024-12-26 NOTE — CONSULTS
Consult acknowledged. Patient admitted to our primary service. H&P to follow.       Ozzie Echeverria DO  Hematology/Oncology Fellow, PGY-IV  [facility] [place]

## 2024-12-26 NOTE — ASSESSMENT & PLAN NOTE
Prevention with posaconazole, acyclovir. CMV prophylaxis with letermovir. PJP prophyalxis atovaquone. Will start Levaquin as ANC<1.0.

## 2024-12-26 NOTE — HPI
69 year old with MDS s/p stem cell transplantation almost 100 days ago on immunosuppression who presented to [facility] with complaints of lightheadedness and weakness over the past few days. The patient was seen in BMT clinic this AM for a regular visit and was noted to be hypotensive and patient presented to the ED for evaluation. Interpretor services offered to patient and he declined, preferring for his son, who is at the bedside, interpret for him. The patient noted decreased PO intake over the past few days and 2-3 episodes of watery diarrhea over the same time. He denies nausea, vomiting, fevers, or chills. He denies sick contacts or any other symptoms. He takes carvedilol for hypertension at home and took his regularly scheduled dose this AM. He has no other complaints today.

## 2024-12-27 ENCOUNTER — TELEPHONE (OUTPATIENT)
Dept: SURGERY | Facility: CLINIC | Age: 69
End: 2024-12-27
Payer: MEDICARE

## 2024-12-27 ENCOUNTER — TELEPHONE (OUTPATIENT)
Dept: HEMATOLOGY/ONCOLOGY | Facility: CLINIC | Age: 69
End: 2024-12-27
Payer: MEDICARE

## 2024-12-27 PROBLEM — D61.810 PANCYTOPENIA DUE TO ANTINEOPLASTIC CHEMOTHERAPY: Status: RESOLVED | Noted: 2023-06-29 | Resolved: 2024-12-27

## 2024-12-27 PROBLEM — E53.8 FOLATE DEFICIENCY: Status: ACTIVE | Noted: 2024-12-27

## 2024-12-27 PROBLEM — E61.0 COPPER DEFICIENCY: Status: ACTIVE | Noted: 2024-12-27

## 2024-12-27 PROBLEM — D84.9 IMMUNOSUPPRESSION: Status: RESOLVED | Noted: 2024-12-26 | Resolved: 2024-12-27

## 2024-12-27 PROBLEM — T45.1X5A PANCYTOPENIA DUE TO ANTINEOPLASTIC CHEMOTHERAPY: Status: RESOLVED | Noted: 2023-06-29 | Resolved: 2024-12-27

## 2024-12-27 PROBLEM — Z86.2 HISTORY OF GRAFT VERSUS HOST DISEASE: Status: ACTIVE | Noted: 2024-12-26

## 2024-12-27 LAB
ALBUMIN SERPL BCP-MCNC: 2.6 G/DL (ref 3.5–5.2)
ALP SERPL-CCNC: 47 U/L (ref 40–150)
ALT SERPL W/O P-5'-P-CCNC: 21 U/L (ref 10–44)
ANION GAP SERPL CALC-SCNC: 7 MMOL/L (ref 8–16)
ANISOCYTOSIS BLD QL SMEAR: SLIGHT
AST SERPL-CCNC: 16 U/L (ref 10–40)
BASOPHILS # BLD AUTO: 0 K/UL (ref 0–0.2)
BASOPHILS NFR BLD: 0 % (ref 0–1.9)
BILIRUB SERPL-MCNC: 0.5 MG/DL (ref 0.1–1)
BUN SERPL-MCNC: 15 MG/DL (ref 8–23)
CALCIUM SERPL-MCNC: 8.2 MG/DL (ref 8.7–10.5)
CHLORIDE SERPL-SCNC: 102 MMOL/L (ref 95–110)
CMV DNA SPEC QL NAA+PROBE: NORMAL
CO2 SERPL-SCNC: 23 MMOL/L (ref 23–29)
CREAT SERPL-MCNC: 0.8 MG/DL (ref 0.5–1.4)
CYTOMEGALOVIRUS PCR, QUANT: NOT DETECTED IU/ML
DIFFERENTIAL METHOD BLD: ABNORMAL
DOHLE BOD BLD QL SMEAR: PRESENT
EOSINOPHIL # BLD AUTO: 0 K/UL (ref 0–0.5)
EOSINOPHIL NFR BLD: 0 % (ref 0–8)
EPSTEIN-BARR VIRUS DNA: ABNORMAL
EPSTEIN-BARR VIRUS LOG (IU/ML): 4.38 LOGIU/ML
EPSTEIN-BARR VIRUS PCR, QUANT: ABNORMAL IU/ML
ERYTHROCYTE [DISTWIDTH] IN BLOOD BY AUTOMATED COUNT: 15.9 % (ref 11.5–14.5)
EST. GFR  (NO RACE VARIABLE): >60 ML/MIN/1.73 M^2
GLUCOSE SERPL-MCNC: 146 MG/DL (ref 70–110)
HCT VFR BLD AUTO: 26.3 % (ref 40–54)
HGB BLD-MCNC: 9.2 G/DL (ref 14–18)
HYPOCHROMIA BLD QL SMEAR: ABNORMAL
IMM GRANULOCYTES # BLD AUTO: 0.01 K/UL (ref 0–0.04)
IMM GRANULOCYTES NFR BLD AUTO: 0.9 % (ref 0–0.5)
LYMPHOCYTES # BLD AUTO: 0.3 K/UL (ref 1–4.8)
LYMPHOCYTES NFR BLD: 26.7 % (ref 18–48)
MAGNESIUM SERPL-MCNC: 1.4 MG/DL (ref 1.6–2.6)
MCH RBC QN AUTO: 37.1 PG (ref 27–31)
MCHC RBC AUTO-ENTMCNC: 35 G/DL (ref 32–36)
MCV RBC AUTO: 106 FL (ref 82–98)
MONOCYTES # BLD AUTO: 0.1 K/UL (ref 0.3–1)
MONOCYTES NFR BLD: 10.3 % (ref 4–15)
NEUTROPHILS # BLD AUTO: 0.7 K/UL (ref 1.8–7.7)
NEUTROPHILS NFR BLD: 62.1 % (ref 38–73)
NRBC BLD-RTO: 0 /100 WBC
OVALOCYTES BLD QL SMEAR: ABNORMAL
PHOSPHATE SERPL-MCNC: 2.1 MG/DL (ref 2.7–4.5)
PLATELET # BLD AUTO: 14 K/UL (ref 150–450)
PLATELET BLD QL SMEAR: ABNORMAL
PMV BLD AUTO: 10.1 FL (ref 9.2–12.9)
POIKILOCYTOSIS BLD QL SMEAR: SLIGHT
POTASSIUM SERPL-SCNC: 4.2 MMOL/L (ref 3.5–5.1)
PROT SERPL-MCNC: 4.8 G/DL (ref 6–8.4)
RBC # BLD AUTO: 2.48 M/UL (ref 4.6–6.2)
SODIUM SERPL-SCNC: 132 MMOL/L (ref 136–145)
TACROLIMUS BLD-MCNC: 13.1 NG/ML (ref 5–15)
TACROLIMUS BLD-MCNC: 8.2 NG/ML (ref 5–15)
WBC # BLD AUTO: 1.16 K/UL (ref 3.9–12.7)

## 2024-12-27 PROCEDURE — 83735 ASSAY OF MAGNESIUM: CPT | Performed by: INTERNAL MEDICINE

## 2024-12-27 PROCEDURE — 97165 OT EVAL LOW COMPLEX 30 MIN: CPT

## 2024-12-27 PROCEDURE — 85025 COMPLETE CBC W/AUTO DIFF WBC: CPT | Performed by: INTERNAL MEDICINE

## 2024-12-27 PROCEDURE — 87045 FECES CULTURE AEROBIC BACT: CPT

## 2024-12-27 PROCEDURE — 87449 NOS EACH ORGANISM AG IA: CPT

## 2024-12-27 PROCEDURE — 25000003 PHARM REV CODE 250: Performed by: INTERNAL MEDICINE

## 2024-12-27 PROCEDURE — 87046 STOOL CULTR AEROBIC BACT EA: CPT

## 2024-12-27 PROCEDURE — 25500020 PHARM REV CODE 255: Performed by: INTERNAL MEDICINE

## 2024-12-27 PROCEDURE — 80053 COMPREHEN METABOLIC PANEL: CPT | Performed by: INTERNAL MEDICINE

## 2024-12-27 PROCEDURE — 97535 SELF CARE MNGMENT TRAINING: CPT

## 2024-12-27 PROCEDURE — 87427 SHIGA-LIKE TOXIN AG IA: CPT

## 2024-12-27 PROCEDURE — 84100 ASSAY OF PHOSPHORUS: CPT | Performed by: INTERNAL MEDICINE

## 2024-12-27 PROCEDURE — 99233 SBSQ HOSP IP/OBS HIGH 50: CPT | Mod: FS,,, | Performed by: INTERNAL MEDICINE

## 2024-12-27 PROCEDURE — 20600001 HC STEP DOWN PRIVATE ROOM

## 2024-12-27 PROCEDURE — 63600175 PHARM REV CODE 636 W HCPCS: Performed by: NURSE PRACTITIONER

## 2024-12-27 PROCEDURE — 80197 ASSAY OF TACROLIMUS: CPT | Performed by: NURSE PRACTITIONER

## 2024-12-27 PROCEDURE — 63600175 PHARM REV CODE 636 W HCPCS: Performed by: INTERNAL MEDICINE

## 2024-12-27 PROCEDURE — 25000003 PHARM REV CODE 250: Performed by: NURSE PRACTITIONER

## 2024-12-27 RX ORDER — ACETAMINOPHEN 325 MG/1
650 TABLET ORAL EVERY 6 HOURS PRN
Status: DISCONTINUED | OUTPATIENT
Start: 2024-12-27 | End: 2025-01-06

## 2024-12-27 RX ORDER — FOLIC ACID 1 MG/1
1 TABLET ORAL DAILY
Status: DISCONTINUED | OUTPATIENT
Start: 2024-12-27 | End: 2025-01-06 | Stop reason: HOSPADM

## 2024-12-27 RX ORDER — ACETAMINOPHEN 325 MG/1
650 TABLET ORAL EVERY 6 HOURS PRN
Status: DISCONTINUED | OUTPATIENT
Start: 2024-12-27 | End: 2024-12-27

## 2024-12-27 RX ORDER — SODIUM CHLORIDE 9 MG/ML
INJECTION, SOLUTION INTRAVENOUS CONTINUOUS
Status: DISCONTINUED | OUTPATIENT
Start: 2024-12-27 | End: 2025-01-03

## 2024-12-27 RX ORDER — MAGNESIUM SULFATE HEPTAHYDRATE 40 MG/ML
2 INJECTION, SOLUTION INTRAVENOUS ONCE
Status: COMPLETED | OUTPATIENT
Start: 2024-12-27 | End: 2024-12-27

## 2024-12-27 RX ADMIN — ACETAMINOPHEN 650 MG: 325 TABLET ORAL at 10:12

## 2024-12-27 RX ADMIN — Medication 400 MG: at 02:12

## 2024-12-27 RX ADMIN — IOHEXOL 75 ML: 350 INJECTION, SOLUTION INTRAVENOUS at 09:12

## 2024-12-27 RX ADMIN — Medication 400 MG: at 05:12

## 2024-12-27 RX ADMIN — Medication 400 MG: at 10:12

## 2024-12-27 RX ADMIN — Medication 1 TABLET: at 05:12

## 2024-12-27 RX ADMIN — ACYCLOVIR 800 MG: 800 TABLET ORAL at 10:12

## 2024-12-27 RX ADMIN — LETERMOVIR 480 MG: 480 TABLET, FILM COATED ORAL at 05:12

## 2024-12-27 RX ADMIN — FOLIC ACID 1 MG: 1 TABLET ORAL at 02:12

## 2024-12-27 RX ADMIN — ONDANSETRON 8 MG: 8 TABLET, ORALLY DISINTEGRATING ORAL at 09:12

## 2024-12-27 RX ADMIN — ZOLPIDEM TARTRATE 5 MG: 5 TABLET, FILM COATED ORAL at 10:12

## 2024-12-27 RX ADMIN — ONDANSETRON 8 MG: 8 TABLET, ORALLY DISINTEGRATING ORAL at 11:12

## 2024-12-27 RX ADMIN — TACROLIMUS 0.5 MG: 0.5 CAPSULE ORAL at 10:12

## 2024-12-27 RX ADMIN — POSACONAZOLE 300 MG: 100 TABLET, DELAYED RELEASE ORAL at 10:12

## 2024-12-27 RX ADMIN — CEFEPIME 2 G: 2 INJECTION, POWDER, FOR SOLUTION INTRAVENOUS at 01:12

## 2024-12-27 RX ADMIN — PIPERACILLIN SODIUM AND TAZOBACTAM SODIUM 4.5 G: 4; .5 INJECTION, POWDER, LYOPHILIZED, FOR SOLUTION INTRAVENOUS at 05:12

## 2024-12-27 RX ADMIN — PANTOPRAZOLE SODIUM 40 MG: 40 TABLET, DELAYED RELEASE ORAL at 10:12

## 2024-12-27 RX ADMIN — GABAPENTIN 600 MG: 300 CAPSULE ORAL at 09:12

## 2024-12-27 RX ADMIN — ATOVAQUONE 1500 MG: 750 SUSPENSION ORAL at 10:12

## 2024-12-27 RX ADMIN — SODIUM CHLORIDE: 9 INJECTION, SOLUTION INTRAVENOUS at 10:12

## 2024-12-27 RX ADMIN — CEFEPIME 2 G: 2 INJECTION, POWDER, FOR SOLUTION INTRAVENOUS at 10:12

## 2024-12-27 RX ADMIN — Medication 1 TABLET: at 02:12

## 2024-12-27 RX ADMIN — ACETAMINOPHEN 650 MG: 325 TABLET ORAL at 01:12

## 2024-12-27 RX ADMIN — ACYCLOVIR 800 MG: 800 TABLET ORAL at 09:12

## 2024-12-27 RX ADMIN — MAGNESIUM SULFATE HEPTAHYDRATE 2 G: 40 INJECTION, SOLUTION INTRAVENOUS at 10:12

## 2024-12-27 RX ADMIN — Medication 1 TABLET: at 10:12

## 2024-12-27 RX ADMIN — ROPINIROLE HYDROCHLORIDE 0.5 MG: 0.25 TABLET, FILM COATED ORAL at 09:12

## 2024-12-27 NOTE — ASSESSMENT & PLAN NOTE
- Prevention with posaconazole, acyclovir. CMV prophylaxis with letermovir. PJP prophyalxis atovaquone. Will start Levaquin as ANC<1.0.

## 2024-12-27 NOTE — ASSESSMENT & PLAN NOTE
Due to underlying disease and chemotherapy. Transfuse for Hgb <7 g/dL and platelets <10k. Folate and copper deficient, on supplementation. Will continue to monitor to see if platelets begin to rise with his supplements. Promacta sent in on 12/9/24. Working on aditya for financial assistance for promacta. Changed TMP/SMX to atovaquone.   Folic Acid deficiency: Continue folic acid 1mg daily  Copper deficiency: Copper level of 635, continue copper gluconate 2mg daily  - continue ppx antimicrobials as above

## 2024-12-27 NOTE — TELEPHONE ENCOUNTER
----- Message from Dereje sent at 12/27/2024  3:26 PM CST -----  Regarding: Consult/Advisory  Contact: [phone]  Consult/Advisory     Name Of Caller: Get        Contact Preference: [phone] Ref# [id number]     Nature of call: Pharmacy calling because they need more information to authorize the prescription. Need a response by 12/29 at 12/43pm

## 2024-12-27 NOTE — PLAN OF CARE
Pt AAOx4. T max 102.7. VSS. Stable on RA. PRN tylenol admininstered x 1 for fever. Cefepime Q8H initiated. Blood cx NGTD. Stool sample needed for C-diff and culture, pt verbalizes understanding. PO intake encouraged, cr 1.2 on admission. Bed in lowest locked position, call light and personal items in reach, nonskid socks on, verbalized understanding to call for assistance. Pt's wife and daughter at bedside, supportive of pt.

## 2024-12-27 NOTE — PROGRESS NOTES
Janusz Ma - Oncology ([facility])  Hematology  Bone Marrow Transplant  Progress Note    Patient Name: Guillaume Salinas  Admission Date: 12/26/2024  Hospital Length of Stay: 0 days  Code Status: Full Code    Subjective:     Interval History: Day +99 s/p Flu/Rosalee/TBI + PtCy Haplo son BMT for MDS. Admitted yesterday through ED with hypotension, weakness, and diarrhea. Neutropenic fever overnight with tmax 102.7F. Infectious workup unremarkable thus far except for chronic LLL infiltrate. Pending CT c/a/p today for further evaluation. Remains on cefepime. Tacro level yesterday 13.2, decreased dose to 0.5mg daily. Repeat level today 8.2. Will repeat dose tomorrow. ADDISON now resolved after IVF. 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 Last EBV on 12/23 was 4665, repeat pending. Replacing mag and phos. Vitals otherwise stable. PO intake encouraged as tolerated. PT/OT consulted    Objective:     Vital Signs (Most Recent):  Temp: 100 °F (37.8 °C) (12/27/24 1127)  Pulse: (!) 114 (12/27/24 1127)  Resp: 16 (12/27/24 0818)  BP: 100/60 (12/27/24 1127)  SpO2: (!) 93 % (12/27/24 1127) Vital Signs (24h Range):  Temp:  [98.1 °F (36.7 °C)-102.7 °F (39.3 °C)] 100 °F (37.8 °C)  Pulse:  [] 114  Resp:  [16-20] 16  SpO2:  [90 %-100 %] 93 %  BP: ()/(45-95) 100/60     Weight: 63 kg (138 lb 14.2 oz)  Body mass index is 20.51 kg/m².  Body surface area is 1.75 meters squared.      Intake/Output - Last 3 Shifts         12/25 0700  12/26 0659 12/26 0700 12/27 0659 12/27 0700  12/28 0659    P.O.  540     IV Piggyback  2350.9     Total Intake(mL/kg)  2890.9 (45.9)     Urine (mL/kg/hr)  240     Stool  0     Total Output  240     Net  +2650.9            Urine Occurrence  3 x     Stool Occurrence  0 x 0 x             Physical Exam  Vitals and nursing note reviewed.   Constitutional:       Appearance: He is well-developed. He is ill-appearing.   HENT:      Head: Normocephalic and  atraumatic.      Right Ear: External ear normal.      Left Ear: External ear normal.      Mouth/Throat:      Mouth: Mucous membranes are dry.      Pharynx: No oropharyngeal exudate.   Eyes:      Extraocular Movements: Extraocular movements intact.      Conjunctiva/sclera: Conjunctivae normal.   Cardiovascular:      Rate and Rhythm: Normal rate and regular rhythm.      Pulses: Normal pulses.      Heart sounds: Normal heart sounds. No murmur heard.  Pulmonary:      Effort: Pulmonary effort is normal. No respiratory distress.      Breath sounds: No stridor. Examination of the left-lower field reveals decreased breath sounds. Decreased breath sounds present. No wheezing or rales.   Abdominal:      General: Bowel sounds are normal.      Palpations: Abdomen is soft.      Tenderness: There is no abdominal tenderness.   Musculoskeletal:         General: Normal range of motion.      Cervical back: Normal range of motion and neck supple.      Right lower leg: No edema.      Left lower leg: No edema.   Skin:     General: Skin is warm and dry.      Findings: No bruising, erythema or rash.      Comments: Right triple lumen vaughn clean and dry with no signs of infection   Neurological:      General: No focal deficit present.      Mental Status: He is alert and oriented to person, place, and time.      Motor: Weakness present.   Psychiatric:         Mood and Affect: Mood normal.         Behavior: Behavior normal.         Thought Content: Thought content normal.         Judgment: Judgment normal.            Significant Labs:   CBC:   Recent Labs   Lab 12/26/24  0843 12/27/24  0454   WBC 1.59* 1.16*   HGB 11.4* 9.2*   HCT 33.0* 26.3*   PLT 19* 14*    and CMP:   Recent Labs   Lab 12/26/24  0843 12/27/24  0454    132*   K 4.5 4.2    102   CO2 23 23   * 146*   BUN 15 15   CREATININE 1.2 0.8   CALCIUM 9.0 8.2*   PROT 5.8* 4.8*   ALBUMIN 3.3* 2.6*   BILITOT 0.5 0.5   ALKPHOS 62 47   AST 19 16   ALT 29 21   ANIONGAP  10 7*       Diagnostic Results:  I have reviewed all pertinent imaging results/findings within the past 24 hours.  Assessment/Plan:     * Neutropenic fever  - tmax 102.7F on 12/27 @0112  - blood cultures x2 NGTD  - UA negative  - RIP/covid negative  - CXR with chronic LLL infiltrate; unable to determine severity  - CT c/a/p with new LLL consolidation, presumed infectious; trace pericholecystic fluid- edema vs. Cholecystitis  - given zosyn and vanc x1 in ED; remains on cefepime at this time  - if fevers persist, will change back to zosyn    S/P allogeneic bone marrow transplant  Status post haploidentical stem cell transplantation conditioned with FluMel 100 + PTCy+ 2Gy TBI . Currently Day+ 99. Engrafted on 10/09/24 day +20.  Day 30 bone marrow (11/5/2024) showing mildly hypercellular marrow with no increased blast (0.3%) and no evidence of myeloid neoplasm. Pending chimerisms, NGS, and CG  Day 100 bone marrow biopsy scheduled on 12/31/24  Plan for central line removal in January with gen surg     - continue ppx acyclovir, posaconazole, and atovaquone  - on cefepime with neutropenic fevers  - home tacro dose 0.5mg BID; level on admit 13.2, dose dropped to 0.5mg daily  - level today 8.2; will have repeat level on Saturday 12/28  - no acute GVHD noted; daily acute and chronic (starting 21/28 at Day +100) GVHD charting while inpatient  - monitor weekly EBV/CMV; last EBV on 21/23 1635; last CMV same date undetected  - continue cmv ppx with letermovir    Myelodysplastic syndrome  - Status post treatment with azacitidine 75 mg/m2 daily x7 days plus venetoclax 100mg (voriconazole) daily x14 of 28 days.Venetoclax decreased to 7 days with cycle 3 until completion of 11 cycles. S/p Haplo BMT as above. No plans for maintenance at this time.     Pancytopenia  Due to underlying disease and chemotherapy. Transfuse for Hgb <7 g/dL and platelets <10k. Folate and copper deficient, on supplementation. Will continue to monitor to see  if platelets begin to rise with his supplements. Promacta sent in on 12/9/24. Working on aditya for financial assistance for promacta. Changed TMP/SMX to atovaquone.   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 He started budesonide 3mg TID on 10/28/24. Due to persistent nausea despite budesonide so started systemic steroids 11/1 at 0.5 mg/kg and his steroid taper has been given to him. Steroid taper completed 12/8/24.  - see tacro dosing under allogeneic BMT    Folate deficiency  - continue home folate    Copper deficiency  - continue home copper    RLS (restless legs syndrome)  - Continue home ropinirole     Hypotension  He was seen in clinic by Dr. Kruse, his hematologist 12/26 in clinic and was noted to have pain in shoulders/heavy weight which he attributed to anemia, leg weakness, loss of appetite, and 2 days of mild to moderate diarrhea. Loperamide helped. He has not had fever, chills, abdominal pain, cough, sore throat, SOB, CP, or urinary discomfort. He was meant to get IV fluids after clinic today due to mild ADDISON (sCr 1.2, baseline 0.6-0.9) however presented to ED instead due to symptoms. He was started on vanc/Zosyn and received 1L IVF with improvement in BP from 73/45 to 95/60. Patient seen at bedside and feeling much better. He states that he has not had a lot to eat or drink for several days due to worsening appetite. Patient had another episode of hypotension 72/45 with improvement again to 97/56 with 1L IVF. MICU evaluated and did not take due to improvement in BP. CXR with persistent improving or recurrent infiltrate at the left  lung base with a small pleural effusion. UA pending. Bcx sent.    Etiology of hypotension is likely decreased oral intake from poor appetite over last few days. Although he has mild diarrhea, he has not been febrile and denies chills, urinary discomfort (although has some frequency), SOB, cough, abdominal pain.  - see neutropenic fever    ADDISON (acute kidney injury)  - Likely prerenal due to decreased PO intake. Received 2L IVF in ED; on continuous IVF  RESOLVED    Insomnia  - Continue home Ambien    Hypertension, essential  - holding home antihypertensives given hypotension on presentation    Physical debility  - PT/OT consulted on admit; appreciate recs        VTE Risk Mitigation (From admission, onward)           Ordered     Reason for No Pharmacological VTE Prophylaxis  Once        Question:  Reasons:  Answer:  Thrombocytopenia    12/26/24 1330     IP VTE HIGH RISK PATIENT  Once         12/26/24 1330     Place sequential compression device  Until discontinued         12/26/24 1330                    Disposition: Remains inpatient    Judy Anthony NP  Bone Marrow Transplant  Janusz Ma - Oncology ([facility])

## 2024-12-27 NOTE — ASSESSMENT & PLAN NOTE
He was seen in clinic by Dr. Kruse, his hematologist 12/26 in clinic and was noted to have pain in shoulders/heavy weight which he attributed to anemia, leg weakness, loss of appetite, and 2 days of mild to moderate diarrhea. Loperamide helped. He has not had fever, chills, abdominal pain, cough, sore throat, SOB, CP, or urinary discomfort. He was meant to get IV fluids after clinic today due to mild ADDISON (sCr 1.2, baseline 0.6-0.9) however presented to ED instead due to symptoms. He was started on vanc/Zosyn and received 1L IVF with improvement in BP from 73/45 to 95/60. Patient seen at bedside and feeling much better. He states that he has not had a lot to eat or drink for several days due to worsening appetite. Patient had another episode of hypotension 72/45 with improvement again to 97/56 with 1L IVF. MICU evaluated and did not take due to improvement in BP. CXR with persistent improving or recurrent infiltrate at the left lung base with a small pleural effusion. UA pending. Bcx sent.    Etiology of hypotension is likely decreased oral intake from poor appetite over last few days. Although he has mild diarrhea, he has not been febrile and denies chills, urinary discomfort (although has some frequency), SOB, cough, abdominal pain.  - see neutropenic fever

## 2024-12-27 NOTE — SUBJECTIVE & OBJECTIVE
Subjective:     Interval History: Day +99 s/p Flu/Rosalee/TBI + PtCy Haplo son BMT for MDS. Admitted yesterday through ED with hypotension, weakness, and diarrhea. Neutropenic fever overnight with tmax 102.7F. Infectious workup unremarkable thus far except for chronic LLL infiltrate. Pending CT c/a/p today for further evaluation. Remains on cefepime. Tacro level yesterday 13.2, decreased dose to 0.5mg daily. Repeat level today 8.2. Will repeat dose tomorrow. ADDISON now resolved after IVF. 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 Last EBV on 12/23 was 4665, repeat pending. Replacing mag and phos. Vitals otherwise stable. PO intake encouraged as tolerated. PT/OT consulted    Objective:     Vital Signs (Most Recent):  Temp: 100 °F (37.8 °C) (12/27/24 1127)  Pulse: (!) 114 (12/27/24 1127)  Resp: 16 (12/27/24 0818)  BP: 100/60 (12/27/24 1127)  SpO2: (!) 93 % (12/27/24 1127) Vital Signs (24h Range):  Temp:  [98.1 °F (36.7 °C)-102.7 °F (39.3 °C)] 100 °F (37.8 °C)  Pulse:  [] 114  Resp:  [16-20] 16  SpO2:  [90 %-100 %] 93 %  BP: ()/(45-95) 100/60     Weight: 63 kg (138 lb 14.2 oz)  Body mass index is 20.51 kg/m².  Body surface area is 1.75 meters squared.      Intake/Output - Last 3 Shifts         12/25 0700  12/26 0659 12/26 0700  12/27 0659 12/27 0700  12/28 0659    P.O.  540     IV Piggyback  2350.9     Total Intake(mL/kg)  2890.9 (45.9)     Urine (mL/kg/hr)  240     Stool  0     Total Output  240     Net  +2650.9            Urine Occurrence  3 x     Stool Occurrence  0 x 0 x             Physical Exam  Vitals and nursing note reviewed.   Constitutional:       Appearance: He is well-developed. He is ill-appearing.   HENT:      Head: Normocephalic and atraumatic.      Right Ear: External ear normal.      Left Ear: External ear normal.      Mouth/Throat:      Mouth: Mucous membranes are dry.      Pharynx: No oropharyngeal exudate.   Eyes:      Extraocular Movements:  Extraocular movements intact.      Conjunctiva/sclera: Conjunctivae normal.   Cardiovascular:      Rate and Rhythm: Normal rate and regular rhythm.      Pulses: Normal pulses.      Heart sounds: Normal heart sounds. No murmur heard.  Pulmonary:      Effort: Pulmonary effort is normal. No respiratory distress.      Breath sounds: No stridor. Examination of the left-lower field reveals decreased breath sounds. Decreased breath sounds present. No wheezing or rales.   Abdominal:      General: Bowel sounds are normal.      Palpations: Abdomen is soft.      Tenderness: There is no abdominal tenderness.   Musculoskeletal:         General: Normal range of motion.      Cervical back: Normal range of motion and neck supple.      Right lower leg: No edema.      Left lower leg: No edema.   Skin:     General: Skin is warm and dry.      Findings: No bruising, erythema or rash.      Comments: Right triple lumen vaughn clean and dry with no signs of infection   Neurological:      General: No focal deficit present.      Mental Status: He is alert and oriented to person, place, and time.      Motor: Weakness present.   Psychiatric:         Mood and Affect: Mood normal.         Behavior: Behavior normal.         Thought Content: Thought content normal.         Judgment: Judgment normal.            Significant Labs:   CBC:   Recent Labs   Lab 12/26/24  0843 12/27/24  0454   WBC 1.59* 1.16*   HGB 11.4* 9.2*   HCT 33.0* 26.3*   PLT 19* 14*    and CMP:   Recent Labs   Lab 12/26/24  0843 12/27/24  0454    132*   K 4.5 4.2    102   CO2 23 23   * 146*   BUN 15 15   CREATININE 1.2 0.8   CALCIUM 9.0 8.2*   PROT 5.8* 4.8*   ALBUMIN 3.3* 2.6*   BILITOT 0.5 0.5   ALKPHOS 62 47   AST 19 16   ALT 29 21   ANIONGAP 10 7*       Diagnostic Results:  I have reviewed all pertinent imaging results/findings within the past 24 hours.

## 2024-12-27 NOTE — ASSESSMENT & PLAN NOTE
GVHD prophylaxis with Post-transplant cyclophosphamide, Tacrolimus, MMF (MMF d/c on D+35). 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 He started budesonide 3mg TID on 10/28/24. Due to persistent nausea despite budesonide so started systemic steroids 11/1 at 0.5 mg/kg and his steroid taper has been given to him. Steroid taper completed 12/8/24.  - see tacro dosing under allogeneic BMT

## 2024-12-27 NOTE — ASSESSMENT & PLAN NOTE
Status post haploidentical stem cell transplantation conditioned with FluMel 100 + PTCy+ 2Gy TBI . Currently Day+ 99. Engrafted on 10/09/24 day +20.  Day 30 bone marrow (11/5/2024) showing mildly hypercellular marrow with no increased blast (0.3%) and no evidence of myeloid neoplasm. Pending chimerisms, NGS, and CG  Day 100 bone marrow biopsy scheduled on 12/31/24  Plan for central line removal in January with gen surg     - continue ppx acyclovir, posaconazole, and atovaquone  - on cefepime with neutropenic fevers  - home tacro dose 0.5mg BID; level on admit 13.2, dose dropped to 0.5mg daily  - level today 8.2; will have repeat level on Saturday 12/28  - no acute GVHD noted; daily acute and chronic (starting 21/28 at Day +100) GVHD charting while inpatient  - monitor weekly EBV/CMV; last EBV on 21/23 3495; last CMV same date undetected  - continue cmv ppx with letermovir

## 2024-12-27 NOTE — PLAN OF CARE
Problem: Occupational Therapy  Goal: Occupational Therapy Goal  Description: Goals to be met by: 1/10/2025     Patient will increase functional independence with ADLs by performing:    UE Dressing with Supervision.  LE Dressing with Supervision.  Grooming while standing at sink with Supervision.  Toileting from toilet with Supervision for hygiene and clothing management.   Toilet transfer to toilet with Supervision.    Outcome: Progressing

## 2024-12-27 NOTE — TELEPHONE ENCOUNTER
Attempted to contact the pt via  [facility] #[id number] to discuss scheduling line removal procedure. No answer. LVM with call back number.

## 2024-12-27 NOTE — HOSPITAL COURSE
12/27/2024 Day +99 s/p Flu/Rosalee/TBI + PtCy Haplo son BMT for MDS. Admitted yesterday through ED with hypotension, weakness, and diarrhea. Neutropenic fever overnight with tmax 102.7F. Infectious workup unremarkable thus far except for chronic LLL infiltrate. Pending CT c/a/p today for further evaluation. Remains on cefepime. Tacro level yesterday 13.2, decreased dose to 0.5mg daily. Repeat level today 8.2. Will repeat dose tomorrow. ADDISON now resolved after IVF. 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 Last EBV on 12/23 was 4665, repeat pending. Replacing mag and phos. Vitals otherwise stable. PO intake encouraged as tolerated. PT/OT consulted  12/30/2024 Day +102 s/p Flu/Rosalee/TBI + PtCy Haplo son BMT for MDS. Tacro 10.1 today, tacro changed to 0.5mg every other day. Day 100 bone marrow biopsy completed today, also done for suspicion of HLH. Received Rituxan yesterday evening. Continues to have high fevers, T.max 103 overnight. Day 4 of Zosyn. IV Vanc and po Vanc stopped today per ID. Continues to have multiple episodes of diarrhea, 9 documented in the last 24 hours. GI consulted for EGD and Flex Sig. Methylpred 2mg/kg/day started. Imodium changed to ATC and PRN Lomotil added. Patient denies abd pain, nausea, states stool is red but has been drinking a lot of red powerade.   12/31/2024. Day + 103 from a Flu/Rosalee/TBI + PtCy Haplo (son) BMT for high grade MDS. Last fever was ~ 24 hours ago. VSS. Ferritin up to 26K today. BMBx performed yesterday and results pending. Day 2 of methylpred. Family reports AMS that started over night. States that patient is saying things that don't make sense. May be due to steroids/ meds. GI deferring flex sig to r/o GVHD of the gut until ANC is > 500. Multiple BMs yesterday, but only one over night per family and nursing documentation. May be responding to steroids if this is GVHD. Weekly CMV and EBV ordered.  01/02/2025 Day + 104 from a  Flu/Rosalee/TBI + PtCy Haplo (son) BMT for high grade MDS.  Afebrile since 12/30. ID has signed off. Completing Zosyn and will transition to ppx Levaquin. 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 Awaiting BM bx results. Reports no diarrhea since yesterday. Will start oral steroid taper. GI has signed off.   01/03/2025 Day + 105 from a Flu/Rosalee/TBI + PtCy Haplo (son) BMT for high grade MDS. Tacro level 5.4 this am, Tacro increased to 0.5 mg bid from daily. Bone marrow biopsy revealing 30-40% cellularity, erythroid hyperplasia, dyserythropoiesis, decreased megakaryocytes with atypical morphology. No hemophagocytosis mentioned. EBV up to 45,870. Completed 4 days of IV steroids, starting quick po taper today. Imodium changed to PRN given improvement in diarrhea. BP now high normal, up 14 lbs in the past week and net +intake. Will stop IVF and restart home Coreg.  01/04/2025 Day + 106 from a Flu/Rosalee/TBI + PtCy Haplo (son) BMT for high grade MDS. 3 loose stools noted overnight however overall improved. Transfusing 1u PLT and K and Mg. On PO steroid taper. Seen comfortably sleeping today  01/05/2025 Day +108 from Flu/Rosalee/TBI + PtCy Haplo (son) BMT for high grade MDS. No BM overnight though did have two non diarrheic stools this am. Feels well. Likely dc on 1/6

## 2024-12-27 NOTE — PT/OT/SLP EVAL
"Occupational Therapy   Evaluation    Name: Guillaume Salinas  MRN: [id number]  Admitting Diagnosis: Neutropenic fever  Recent Surgery: * No surgery found *      Recommendations:     Discharge Recommendations: Low Intensity Therapy  Discharge Equipment Recommendations:  walker, rolling  Barriers to discharge:  None    DME Justifications     Rolling Walker- Patient demonstrates a mobility limitation that significantly impairs their ability to participate in one or more mobility related activities of daily living. Patient's mobility limitation cannot be sufficiently resolved with the use of a cane, but can be sufficiently resolved with the use of a rolling walker.The use of a rolling walker will considerably improve their ability to participate in MRADLs. Patient will use the walker on a regular basis at home.      Assessment:     Guillaume Salinas is a 69 y.o. male with a medical diagnosis of Neutropenic fever.  He presents with the following performance deficits affecting function: weakness, impaired endurance, impaired self care skills, impaired functional mobility, gait instability, impaired balance. Pt was willing to participate and tolerated session well overall. He was able to perform bed mobility, dressing tasks eob, ambulate to perform toileting task in bathroom and finish session in chair. He needed assistance c/ ambulation 2/2 to decreased balance without RW.     Rehab Prognosis: Good; patient would benefit from acute skilled OT services to address these deficits and reach maximum level of function.       Plan:     Patient to be seen 3 x/week to address the above listed problems via self-care/home management, therapeutic activities, therapeutic exercises  Plan of Care Expires: 01/27/25  Plan of Care Reviewed with: patient, spouse, daughter    Subjective     Chief Complaint: none stated  Patient/Family Comments/goals: "I need to use the bathroom."    Occupational Profile:  Living Environment: " SSH, level entrance, t/s, raised toilet c/ armrest; lives c/ wife  Previous level of function: indep  Roles and Routines: , father  Equipment Used at Home: raised toilet, shower chair, grab bar, rollator  Assistance upon Discharge: wife, daughter    Pain/Comfort:  Pain Rating 1: 0/10  Pain Rating Post-Intervention 1: 0/10    Patients cultural, spiritual, Anabaptism conflicts given the current situation: no    Objective:     Communicated with: RN prior to session.  Patient found supine with peripheral IV upon OT entry to room.    General Precautions: Standard, fall  Orthopedic Precautions: N/A  Braces: N/A  Respiratory Status: Room air    Occupational Performance:    Bed Mobility:    Patient completed Rolling/Turning to Right with stand by assistance  Patient completed Scooting/Bridging with stand by assistance  Patient completed Supine to Sit with stand by assistance    Functional Mobility/Transfers:  Patient completed Sit <> Stand Transfer with contact guard assistance  with  no assistive device   Chair t/f: CGA no AD  Functional Mobility: from bed to chair, then chair to bathroom and back; Min A no AD c/ HHA for balance    Activities of Daily Living:  Grooming: contact guard assistance pt stood at sink to wash hands  Lower Body Dressing: setup/SBA; pt donned socks seated eob    Toileting: contact guard assistance pt stood to urinate at standard toilet; CGA for balance    Cognitive/Visual Perceptual:  Cognitive/Psychosocial Skills:     -       Oriented to: Person, Place, Time, and Situation   -       Follows Commands/attention:Follows one-step commands and Follows two-step commands  -       Safety awareness/insight to disability: intact     Physical Exam:  Balance:    -       static sitting balance eob: SPV ~ 5 min; dynamic standing balance  Upper Extremity Range of Motion:     -       Right Upper Extremity: WFL  -       Left Upper Extremity: WFL  Upper Extremity Strength:    -       Right Upper Extremity:  WFL  -       Left Upper Extremity: WFL   Strength:    -       Right Upper Extremity: WFL  -       Left Upper Extremity: WFL  Fine Motor Coordination:    -       Intact  Left hand thumb/finger opposition skills and Right hand thumb/finger opposition skills    AMPAC 6 Click ADL:  AMPAC Total Score: 23    Treatment & Education:  Pt edu on role of OT, POC, safety when performing self care tasks , benefit of performing OOB activity, and safety when performing functional transfers and mobility.  - White board updated  - Self care tasks completed-- as noted above      Patient left up in chair with all lines intact, call button in reach, RN notified, and wife, daughter present    Wife and daughter translated during session per pt preference.     GOALS:   Multidisciplinary Problems       Occupational Therapy Goals          Problem: Occupational Therapy    Goal Priority Disciplines Outcome Interventions   Occupational Therapy Goal     OT, PT/OT Progressing    Description: Goals to be met by: 1/10/2025     Patient will increase functional independence with ADLs by performing:    UE Dressing with Supervision.  LE Dressing with Supervision.  Grooming while standing at sink with Supervision.  Toileting from toilet with Supervision for hygiene and clothing management.   Toilet transfer to toilet with Supervision.                         History:     Past Medical History:   Diagnosis Date    Anticoagulant long-term use     Coronary artery disease     Hypertension     Myelodysplastic syndrome     Peripheral vascular disease, unspecified          Past Surgical History:   Procedure Laterality Date    BONE MARROW BIOPSY Left 4/26/2023    Procedure: Biopsy-bone marrow;  Surgeon: Harry Diamond MD;  Location: [facility] OR;  Service: Oncology;  Laterality: Left;    COLONOSCOPY N/A 01/05/2022    Procedure: COLONOSCOPY Golytely Vaccinated will bring cards;  Surgeon: Dereje Simon MD;  Location: [facility];  Service: Endoscopy;   Laterality: N/A;  Do not cancel this order    INSERTION OF LEMONS CATHETER Right 9/13/2024    Procedure: INSERTION, CATHETER, CENTRAL VENOUS, LEMONS TRIPLE LUMEN;  Surgeon: Kg Patten MD;  Location: [facility] OR [address] [place] [address];  Service: General;  Laterality: Right;       Time Tracking:     OT Date of Treatment: 12/27/24  OT Start Time: 1526  OT Stop Time: 1544  OT Total Time (min): 18 min    Billable Minutes:Evaluation 8  Self Care/Home Management 10    12/27/2024

## 2024-12-27 NOTE — ASSESSMENT & PLAN NOTE
- Status post treatment with azacitidine 75 mg/m2 daily x7 days plus venetoclax 100mg (voriconazole) daily x14 of 28 days.Venetoclax decreased to 7 days with cycle 3 until completion of 11 cycles. S/p Haplo BMT as above. No plans for maintenance at this time.

## 2024-12-27 NOTE — ASSESSMENT & PLAN NOTE
- tmax 102.7F on 12/27 @0112  - blood cultures x2 NGTD  - UA negative  - RIP/covid negative  - CXR with chronic LLL infiltrate; unable to determine severity  - CT c/a/p with new LLL consolidation, presumed infectious; trace pericholecystic fluid- edema vs. Cholecystitis  - given zosyn and vanc x1 in ED; remains on cefepime at this time  - if fevers persist, will change back to zosyn

## 2024-12-27 NOTE — TELEPHONE ENCOUNTER
Spoke to representative at [facility] department who stated that questions regarding Zofran needed to be filled out and faxed back

## 2024-12-28 LAB
ALBUMIN SERPL BCP-MCNC: 2.5 G/DL (ref 3.5–5.2)
ALP SERPL-CCNC: 47 U/L (ref 40–150)
ALT SERPL W/O P-5'-P-CCNC: 21 U/L (ref 10–44)
ANION GAP SERPL CALC-SCNC: 10 MMOL/L (ref 8–16)
ANISOCYTOSIS BLD QL SMEAR: SLIGHT
AST SERPL-CCNC: 20 U/L (ref 10–40)
BASOPHILS # BLD AUTO: 0 K/UL (ref 0–0.2)
BASOPHILS NFR BLD: 0 % (ref 0–1.9)
BILIRUB SERPL-MCNC: 0.6 MG/DL (ref 0.1–1)
BUN SERPL-MCNC: 8 MG/DL (ref 8–23)
CALCIUM SERPL-MCNC: 7.7 MG/DL (ref 8.7–10.5)
CHLORIDE SERPL-SCNC: 101 MMOL/L (ref 95–110)
CO2 SERPL-SCNC: 22 MMOL/L (ref 23–29)
CREAT SERPL-MCNC: 0.8 MG/DL (ref 0.5–1.4)
DIFFERENTIAL METHOD BLD: ABNORMAL
EOSINOPHIL # BLD AUTO: 0 K/UL (ref 0–0.5)
EOSINOPHIL NFR BLD: 0 % (ref 0–8)
ERYTHROCYTE [DISTWIDTH] IN BLOOD BY AUTOMATED COUNT: 15.8 % (ref 11.5–14.5)
EST. GFR  (NO RACE VARIABLE): >60 ML/MIN/1.73 M^2
FERRITIN SERPL-MCNC: ABNORMAL NG/ML (ref 20–300)
GLUCOSE SERPL-MCNC: 111 MG/DL (ref 70–110)
HCT VFR BLD AUTO: 25.3 % (ref 40–54)
HGB BLD-MCNC: 9.1 G/DL (ref 14–18)
IMM GRANULOCYTES # BLD AUTO: 0.01 K/UL (ref 0–0.04)
IMM GRANULOCYTES NFR BLD AUTO: 0.8 % (ref 0–0.5)
LDH SERPL L TO P-CCNC: 316 U/L (ref 110–260)
LYMPHOCYTES # BLD AUTO: 0.5 K/UL (ref 1–4.8)
LYMPHOCYTES NFR BLD: 37.5 % (ref 18–48)
MAGNESIUM SERPL-MCNC: 1.4 MG/DL (ref 1.6–2.6)
MCH RBC QN AUTO: 38.4 PG (ref 27–31)
MCHC RBC AUTO-ENTMCNC: 36 G/DL (ref 32–36)
MCV RBC AUTO: 107 FL (ref 82–98)
MONOCYTES # BLD AUTO: 0.2 K/UL (ref 0.3–1)
MONOCYTES NFR BLD: 12.5 % (ref 4–15)
NEUTROPHILS # BLD AUTO: 0.6 K/UL (ref 1.8–7.7)
NEUTROPHILS NFR BLD: 49.2 % (ref 38–73)
NRBC BLD-RTO: 0 /100 WBC
OVALOCYTES BLD QL SMEAR: ABNORMAL
PHOSPHATE SERPL-MCNC: 2 MG/DL (ref 2.7–4.5)
PLATELET # BLD AUTO: 10 K/UL (ref 150–450)
PLATELET BLD QL SMEAR: ABNORMAL
PMV BLD AUTO: 8.3 FL (ref 9.2–12.9)
POTASSIUM SERPL-SCNC: 3.1 MMOL/L (ref 3.5–5.1)
PROT SERPL-MCNC: 4.9 G/DL (ref 6–8.4)
RBC # BLD AUTO: 2.37 M/UL (ref 4.6–6.2)
SODIUM SERPL-SCNC: 133 MMOL/L (ref 136–145)
SPHEROCYTES BLD QL SMEAR: ABNORMAL
TACROLIMUS BLD-MCNC: 8.1 NG/ML (ref 5–15)
WBC # BLD AUTO: 1.2 K/UL (ref 3.9–12.7)

## 2024-12-28 PROCEDURE — 97530 THERAPEUTIC ACTIVITIES: CPT

## 2024-12-28 PROCEDURE — 63600175 PHARM REV CODE 636 W HCPCS: Performed by: STUDENT IN AN ORGANIZED HEALTH CARE EDUCATION/TRAINING PROGRAM

## 2024-12-28 PROCEDURE — 83615 LACTATE (LD) (LDH) ENZYME: CPT | Performed by: INTERNAL MEDICINE

## 2024-12-28 PROCEDURE — 87324 CLOSTRIDIUM AG IA: CPT | Performed by: INTERNAL MEDICINE

## 2024-12-28 PROCEDURE — 99233 SBSQ HOSP IP/OBS HIGH 50: CPT | Mod: ,,, | Performed by: INTERNAL MEDICINE

## 2024-12-28 PROCEDURE — 25000003 PHARM REV CODE 250: Performed by: INTERNAL MEDICINE

## 2024-12-28 PROCEDURE — 99223 1ST HOSP IP/OBS HIGH 75: CPT | Mod: ,,, | Performed by: INTERNAL MEDICINE

## 2024-12-28 PROCEDURE — 80053 COMPREHEN METABOLIC PANEL: CPT | Performed by: INTERNAL MEDICINE

## 2024-12-28 PROCEDURE — 85025 COMPLETE CBC W/AUTO DIFF WBC: CPT | Performed by: INTERNAL MEDICINE

## 2024-12-28 PROCEDURE — 63600175 PHARM REV CODE 636 W HCPCS: Performed by: NURSE PRACTITIONER

## 2024-12-28 PROCEDURE — 36415 COLL VENOUS BLD VENIPUNCTURE: CPT | Performed by: STUDENT IN AN ORGANIZED HEALTH CARE EDUCATION/TRAINING PROGRAM

## 2024-12-28 PROCEDURE — 83735 ASSAY OF MAGNESIUM: CPT | Performed by: INTERNAL MEDICINE

## 2024-12-28 PROCEDURE — 63600175 PHARM REV CODE 636 W HCPCS: Performed by: INTERNAL MEDICINE

## 2024-12-28 PROCEDURE — 27000207 HC ISOLATION

## 2024-12-28 PROCEDURE — 25000003 PHARM REV CODE 250: Performed by: NURSE PRACTITIONER

## 2024-12-28 PROCEDURE — 84100 ASSAY OF PHOSPHORUS: CPT | Performed by: INTERNAL MEDICINE

## 2024-12-28 PROCEDURE — 82728 ASSAY OF FERRITIN: CPT | Performed by: INTERNAL MEDICINE

## 2024-12-28 PROCEDURE — 87040 BLOOD CULTURE FOR BACTERIA: CPT | Performed by: STUDENT IN AN ORGANIZED HEALTH CARE EDUCATION/TRAINING PROGRAM

## 2024-12-28 PROCEDURE — 97161 PT EVAL LOW COMPLEX 20 MIN: CPT

## 2024-12-28 PROCEDURE — 80197 ASSAY OF TACROLIMUS: CPT | Performed by: NURSE PRACTITIONER

## 2024-12-28 PROCEDURE — 20600001 HC STEP DOWN PRIVATE ROOM

## 2024-12-28 RX ORDER — PROCHLORPERAZINE EDISYLATE 5 MG/ML
2.5 INJECTION INTRAMUSCULAR; INTRAVENOUS EVERY 6 HOURS PRN
Status: DISCONTINUED | OUTPATIENT
Start: 2024-12-28 | End: 2025-01-06 | Stop reason: HOSPADM

## 2024-12-28 RX ORDER — LOPERAMIDE HYDROCHLORIDE 2 MG/1
2 CAPSULE ORAL 4 TIMES DAILY PRN
Status: DISCONTINUED | OUTPATIENT
Start: 2024-12-28 | End: 2024-12-28

## 2024-12-28 RX ORDER — MUPIROCIN 20 MG/G
OINTMENT TOPICAL 2 TIMES DAILY
Status: DISPENSED | OUTPATIENT
Start: 2024-12-28 | End: 2025-01-02

## 2024-12-28 RX ADMIN — TACROLIMUS 0.5 MG: 0.5 CAPSULE ORAL at 11:12

## 2024-12-28 RX ADMIN — Medication 2 TABLET: at 01:12

## 2024-12-28 RX ADMIN — POSACONAZOLE 300 MG: 100 TABLET, DELAYED RELEASE ORAL at 11:12

## 2024-12-28 RX ADMIN — ONDANSETRON 8 MG: 8 TABLET, ORALLY DISINTEGRATING ORAL at 11:12

## 2024-12-28 RX ADMIN — ATOVAQUONE 1500 MG: 750 SUSPENSION ORAL at 09:12

## 2024-12-28 RX ADMIN — Medication 2 TABLET: at 11:12

## 2024-12-28 RX ADMIN — ACYCLOVIR 800 MG: 800 TABLET ORAL at 08:12

## 2024-12-28 RX ADMIN — Medication 400 MG: at 01:12

## 2024-12-28 RX ADMIN — ACETAMINOPHEN 650 MG: 325 TABLET ORAL at 09:12

## 2024-12-28 RX ADMIN — ROPINIROLE HYDROCHLORIDE 0.5 MG: 0.25 TABLET, FILM COATED ORAL at 08:12

## 2024-12-28 RX ADMIN — ONDANSETRON 8 MG: 8 TABLET, ORALLY DISINTEGRATING ORAL at 06:12

## 2024-12-28 RX ADMIN — FOLIC ACID 1 MG: 1 TABLET ORAL at 11:12

## 2024-12-28 RX ADMIN — ACETAMINOPHEN 650 MG: 325 TABLET ORAL at 06:12

## 2024-12-28 RX ADMIN — PIPERACILLIN SODIUM AND TAZOBACTAM SODIUM 4.5 G: 4; .5 INJECTION, POWDER, LYOPHILIZED, FOR SOLUTION INTRAVENOUS at 11:12

## 2024-12-28 RX ADMIN — LETERMOVIR 480 MG: 480 TABLET, FILM COATED ORAL at 05:12

## 2024-12-28 RX ADMIN — Medication 400 MG: at 05:12

## 2024-12-28 RX ADMIN — Medication 2 TABLET: at 05:12

## 2024-12-28 RX ADMIN — PROCHLORPERAZINE EDISYLATE 2.5 MG: 5 INJECTION INTRAMUSCULAR; INTRAVENOUS at 08:12

## 2024-12-28 RX ADMIN — PANTOPRAZOLE SODIUM 40 MG: 40 TABLET, DELAYED RELEASE ORAL at 11:12

## 2024-12-28 RX ADMIN — POTASSIUM CHLORIDE 20 MEQ: 1500 TABLET, EXTENDED RELEASE ORAL at 11:12

## 2024-12-28 RX ADMIN — GABAPENTIN 600 MG: 300 CAPSULE ORAL at 08:12

## 2024-12-28 RX ADMIN — PIPERACILLIN SODIUM AND TAZOBACTAM SODIUM 4.5 G: 4; .5 INJECTION, POWDER, LYOPHILIZED, FOR SOLUTION INTRAVENOUS at 08:12

## 2024-12-28 RX ADMIN — Medication 2 MG: at 01:12

## 2024-12-28 RX ADMIN — MUPIROCIN: 20 OINTMENT TOPICAL at 12:12

## 2024-12-28 RX ADMIN — Medication 400 MG: at 09:12

## 2024-12-28 RX ADMIN — PIPERACILLIN SODIUM AND TAZOBACTAM SODIUM 4.5 G: 4; .5 INJECTION, POWDER, LYOPHILIZED, FOR SOLUTION INTRAVENOUS at 12:12

## 2024-12-28 RX ADMIN — VANCOMYCIN HYDROCHLORIDE 1250 MG: 1.25 INJECTION, POWDER, LYOPHILIZED, FOR SOLUTION INTRAVENOUS at 01:12

## 2024-12-28 RX ADMIN — ZOLPIDEM TARTRATE 5 MG: 5 TABLET, FILM COATED ORAL at 11:12

## 2024-12-28 RX ADMIN — ACYCLOVIR 800 MG: 800 TABLET ORAL at 11:12

## 2024-12-28 RX ADMIN — POTASSIUM CHLORIDE 20 MEQ: 1500 TABLET, EXTENDED RELEASE ORAL at 01:12

## 2024-12-28 NOTE — PLAN OF CARE
Pt AAOX4. Wife and daughter remain at the bedside. Standby assist. Daughter and wife to Temp of 102.4; Tylenol administered. Pt tolerated well; temp down to 98.2. All other VSS. Only 2 bowel movements on this shift. Contact precautions still in place while awaiting stool sample results. No reports of N/V or pain during day shift.

## 2024-12-28 NOTE — CLINICAL REVIEW
IP Sepsis Screen (most recent)       Sepsis Screen (IP) - 12/28/24 3693       Is the patient's history or complaint suggestive of a possible infection? Yes  -WL    Are there at least two of the following signs and symptoms present? Yes  -WL    Sepsis signs/symptoms - Tachycardia Tachycardia     >90  -WL    Sepsis signs/symptoms - WBC WBC < 4,000 or WBC > 12,000  -WL    Are any of the following organ dysfunction criteria present and not considered to be due to a chronic condition? Yes  -WL    Organ Dysfunction Criteria Total Bilirubin > 2.0 Platelet count < 100,000  -WL    Organ Dysfunction Criteria - O2 O2 Saturation < 95% on room air  -WL    Initiate Sepsis Protocol No  -WL    Reason sepsis not considered Pt. receiving appropriate management   ID following, on immunosuppression, BCx2 ngtd, LA wnl, on abx -WL              User Key  (r) = Recorded By, (t) = Taken By, (c) = Cosigned By      Initials Name    Shelley Guerrero RN

## 2024-12-28 NOTE — PLAN OF CARE
Problem: Physical Therapy  Goal: Physical Therapy Goal  Description: Goals to be met by: 2025     Patient will increase functional independence with mobility by performin. Sit to stand transfer with Randall  2. Gait  x 250 feet with Randall using No Assistive Device.   3. Lower extremity exercise program x10 reps , with independence    Outcome: Progressing     Pt evaluated and appropriate goals established.

## 2024-12-28 NOTE — CONSULTS
[facility] - Oncology ([facility])  Infectious Disease  Consult Note    Patient Name: Guillaume Salinas  MRN: [id number]  Admission Date: 12/26/2024  Hospital Length of Stay: 1 days  Attending Physician: Darian Best MD  Primary Care Provider: Kal Hargrove MD     Isolation Status: No active isolations    Patient information was obtained from patient, spouse/SO, relative(s), and past medical records.      Inpatient consult to Infectious Diseases  Consult performed by: Sumeet Hernandez MD  Consult ordered by: Celso Baig MD  Reason for consult: fever      Assessment/Plan:     Oncology  * Neutropenic fever  70 y/o M h/o MDS s/p haploidentical SCT conditioned with FluMel 100 + PTCy+ 2Gy TBI, tacrolimus for GVHD prophylaxis, CDI s/p vancomycin taper, who is near day +100 presented to ER with  weakness and lightheadedness found to be febrile to 102.7, hypotensive. Pancytopenia, CT c/a/p with New left lower lobe consolidation and parapneumonic effusion and trace pericholecystic fluid without significant gallbladder wall thickening or gallstones, LFTs normal, RIP negative.  Of note he has had significant EBV reactivation  - agree with current antimicrobials empirically for pneumonia  - f/u CDiff, if positive start fidaxomicin, if negative start PO vancomycin ppx 125mg BID  - other ppx per SCT protocol  - discussed with team, daughter and wife at length, will follow        Thank you for your consult. I will follow-up with patient. Please contact us if you have any additional questions.    Sumeet Hernandez MD  Infectious Disease  [facility] - Oncology [facility])    Subjective:     Principal Problem: Neutropenic fever    HPI: 70 y/o M h/o MDS s/p haploidentical SCT conditioned with FluMel 100 + PTCy+ 2Gy TBI, tacrolimus for GVHD prophylaxis, CDI s/p vancomycin taper, who is near day +100 presented to ER with  weakness and lightheadedness found to be febrile to 102.7, hypotensive. Pancytopenia, CT  c/a/p with New left lower lobe consolidation and parapneumonic effusion and trace pericholecystic fluid without significant gallbladder wall thickening or gallstones, LFTs normal, RIP negative    Of note pt recently was gardening    Past Medical History:   Diagnosis Date    Anticoagulant long-term use     Coronary artery disease     Hypertension     Myelodysplastic syndrome     Peripheral vascular disease, unspecified        Past Surgical History:   Procedure Laterality Date    BONE MARROW BIOPSY Left 4/26/2023    Procedure: Biopsy-bone marrow;  Surgeon: Harry Diamond MD;  Location: [facility] OR;  Service: Oncology;  Laterality: Left;    COLONOSCOPY N/A 01/05/2022    Procedure: COLONOSCOPY Golytely Vaccinated will bring cards;  Surgeon: Dereje Simon MD;  Location: [facility] ENDO;  Service: Endoscopy;  Laterality: N/A;  Do not cancel this order    INSERTION OF LEMONS CATHETER Right 9/13/2024    Procedure: INSERTION, CATHETER, CENTRAL VENOUS, LEMONS TRIPLE LUMEN;  Surgeon: Kg Patten MD;  Location: [address];  Service: General;  Laterality: Right;       Review of patient's allergies indicates:  No Known Allergies    Medications:  Medications Prior to Admission   Medication Sig    acyclovir (ZOVIRAX) 800 MG Tab Take 1 tablet (800 mg total) by mouth 2 (two) times daily.    atovaquone (MEPRON) 750 mg/5 mL Susp oral liquid Take 10 mLs (1,500 mg total) by mouth once daily.    carvediloL (COREG) 6.25 MG tablet Take 1 tablet (6.25 mg total) by mouth 2 (two) times daily.    copper gluconate 2 mg Cap Take 2 mg by mouth once daily.    eltrombopag olamine (PROMACTA) 50 MG Tab Take 1 tablet (50 mg total) by mouth once daily.    folic acid (FOLVITE) 1 MG tablet Take 1 tablet (1 mg total) by mouth once daily.    gabapentin (NEURONTIN) 300 MG capsule Take 2 capsules (600 mg total) by mouth every evening.    letermovir (PREVYMIS) 480 mg Tab Take 1 tablet (480 mg total) by mouth Daily.     "loperamide (IMODIUM A-D) 2 mg Tab Take 2 mg by mouth 4 (four) times daily as needed.    magnesium oxide (MAG-OX) 400 mg (241.3 mg magnesium) tablet Take 2 tablets (800 mg total) by mouth 2 (two) times daily.    ondansetron (ZOFRAN-ODT) 8 MG TbDL Take 1 tablet (8 mg total) by mouth every 8 (eight) hours as needed (nausea).    pantoprazole (PROTONIX) 40 MG tablet Take 1 tablet (40 mg total) by mouth once daily.    posaconazole (NOXAFIL) 100 mg TbEC tablet Take 3 tablets (300 mg total) by mouth once daily.    rOPINIRole (REQUIP) 0.5 MG tablet Take 1 tablet (0.5 mg total) by mouth every evening.    tacrolimus (PROGRAF) 0.5 MG Cap Take 1 capsule (0.5 mg total) by mouth every morning.    traMADoL (ULTRAM) 50 mg tablet Take 1 tablet (50 mg total) by mouth every 6 (six) hours as needed for Pain.    zolpidem (AMBIEN) 5 MG Tab Take 1 tablet (5 mg total) by mouth nightly as needed (insomnia).     Antibiotics (From admission, onward)      Start     Stop Route Frequency Ordered    12/28/24 0915  mupirocin 2 % ointment         01/02/25 0859 Nasl 2 times daily 12/28/24 0803    12/28/24 0915  vancomycin 1,250 mg in 0.9% NaCl 250 mL IVPB (admixture device)         -- IV Once 12/28/24 0805    12/28/24 0859  vancomycin - pharmacy to dose  (vancomycin IVPB (PEDS and ADULTS))        Placed in "And" Linked Group    -- IV pharmacy to manage frequency 12/28/24 0801    12/27/24 1630  piperacillin-tazobactam (ZOSYN) 4.5 g in D5W 100 mL IVPB (MB+)         -- IV Every 8 hours (non-standard times) 12/27/24 1516          Antifungals (From admission, onward)      Start     Stop Route Frequency Ordered    12/26/24 1430  posaconazole EC tablet 300 mg         -- Oral Daily 12/26/24 1328          Antivirals (From admission, onward)          Stop Route Frequency     acyclovir         -- Oral 2 times daily     letermovir         -- Oral Daily             Immunization History   Administered Date(s) Administered    COVID-19, MRNA, LN-S, PF " (MODERNA FULL 0.5 ML DOSE) 2021, 2021, 10/29/2021    Influenza (FLUAD) - Quadrivalent - Adjuvanted - PF *Preferred* (65+) 10/19/2021, 2023    Pneumococcal Conjugate - 13 Valent 10/19/2021    Pneumococcal Conjugate - 20 Valent 2023    RSVpreF (Arexvy) 2023       Family History       Problem Relation (Age of Onset)    Cancer Father, Brother    Hypertension Mother    No Known Problems Daughter, Daughter, Son          Social History     Socioeconomic History    Marital status:    Tobacco Use    Smoking status: Former     Current packs/day: 0.00     Average packs/day: 0.3 packs/day for 50.0 years (12.5 ttl pk-yrs)     Types: Cigarettes     Start date: 3/1/1973     Quit date: 3/1/2023     Years since quittin.8     Passive exposure: Past    Smokeless tobacco: Never   Substance and Sexual Activity    Alcohol use: Not Currently    Drug use: Never    Sexual activity: Not Currently     Partners: Female     Social Drivers of Health     Financial Resource Strain: Low Risk  (2024)    Overall Financial Resource Strain (CARDIA)     Difficulty of Paying Living Expenses: Not hard at all   Food Insecurity: No Food Insecurity (2024)    Hunger Vital Sign     Worried About Running Out of Food in the Last Year: Never true     Ran Out of Food in the Last Year: Never true   Transportation Needs: No Transportation Needs (2024)    TRANSPORTATION NEEDS     Transportation : No   Physical Activity: Insufficiently Active (2024)    Exercise Vital Sign     Days of Exercise per Week: 2 days     Minutes of Exercise per Session: 60 min   Stress: Stress Concern Present (2024)    [place] [employer] - Occupational Stress Questionnaire     Feeling of Stress : To some extent   Housing Stability: Low Risk  (2024)    Housing Stability Vital Sign     Unable to Pay for Housing in the Last Year: No     Homeless in the Last Year: No     Review of  Systems   Constitutional:  Positive for activity change, appetite change, chills, fatigue and fever.   HENT:  Negative for congestion, dental problem, mouth sores and sinus pressure.    Eyes:  Negative for pain, redness and visual disturbance.   Respiratory:  Negative for cough, shortness of breath and wheezing.    Cardiovascular:  Negative for chest pain and leg swelling.   Gastrointestinal:  Positive for diarrhea. Negative for abdominal distention, abdominal pain, nausea and vomiting.   Endocrine: Negative for polyuria.   Genitourinary:  Negative for decreased urine volume, dysuria and frequency.   Musculoskeletal:  Negative for joint swelling.   Skin:  Negative for color change.   Allergic/Immunologic: Negative for food allergies.   Neurological:  Negative for dizziness, weakness and headaches.   Hematological:  Negative for adenopathy.   Psychiatric/Behavioral:  Negative for agitation and confusion. The patient is not nervous/anxious.      Objective:     Vital Signs (Most Recent):  Temp: 98.7 °F (37.1 °C) (12/28/24 1115)  Pulse: 108 (12/28/24 0800)  Resp: 18 (12/28/24 0800)  BP: 127/68 (12/28/24 0800)  SpO2: (!) 92 % (12/28/24 0800) Vital Signs (24h Range):  Temp:  [98.7 °F (37.1 °C)-102.9 °F (39.4 °C)] 98.7 °F (37.1 °C)  Pulse:  [108-114] 108  Resp:  [16-19] 18  SpO2:  [92 %-95 %] 92 %  BP: (122-154)/(62-93) 127/68     Weight: 63 kg (138 lb 14.2 oz)  Body mass index is 20.51 kg/m².    Estimated Creatinine Clearance: 77.7 mL/min (based on SCr of 0.8 mg/dL).     Physical Exam  Constitutional:       Appearance: He is well-developed.   HENT:      Head: Normocephalic and atraumatic.   Eyes:      Conjunctiva/sclera: Conjunctivae normal.   Cardiovascular:      Rate and Rhythm: Normal rate and regular rhythm.      Heart sounds: Normal heart sounds. No murmur heard.  Pulmonary:      Effort: Pulmonary effort is normal. No respiratory distress.      Breath sounds: Normal breath sounds. No wheezing.   Abdominal:       General: Bowel sounds are normal. There is no distension.      Palpations: Abdomen is soft.      Tenderness: There is no abdominal tenderness.   Musculoskeletal:         General: No tenderness. Normal range of motion.      Cervical back: Neck supple.   Lymphadenopathy:      Cervical: No cervical adenopathy.   Skin:     General: Skin is warm and dry.      Findings: No rash.   Neurological:      Mental Status: He is alert and oriented to person, place, and time.      Coordination: Coordination normal.   Psychiatric:         Behavior: Behavior normal.          Significant Labs: CBC:   Recent Labs   Lab 12/27/24  0454 12/28/24  0507   WBC 1.16* 1.20*   HGB 9.2* 9.1*   HCT 26.3* 25.3*   PLT 14* 10*     CMP:   Recent Labs   Lab 12/27/24  0454 12/28/24  0507   * 133*   K 4.2 3.1*    101   CO2 23 22*   * 111*   BUN 15 8   CREATININE 0.8 0.8   CALCIUM 8.2* 7.7*   PROT 4.8* 4.9*   ALBUMIN 2.6* 2.5*   BILITOT 0.5 0.6   ALKPHOS 47 47   AST 16 20   ALT 21 21   ANIONGAP 7* 10       Significant Imaging: I have reviewed all pertinent imaging results/findings within the past 24 hours.

## 2024-12-28 NOTE — PROGRESS NOTES
Pharmacokinetic Initial Assessment: IV Vancomycin    Assessment/Plan:    Will initiate vancomycin 1250mg IV every 12 hours  Desired empiric serum trough concentration is 15 to 20 mcg/mL  Draw vancomycin trough level 60 min prior to fourth dose on 12/30 at approximately 0000  Pharmacy will continue to follow and monitor vancomycin.      Please contact pharmacy at extension [phone] with any questions regarding this assessment.     Thank you for the consult,   Terese Cantor       Patient brief summary:  Guillaume Salinas is a 69 y.o. male initiated on antimicrobial therapy with IV Vancomycin for treatment of suspected neutropenic fever    Drug Allergies:   Review of patient's allergies indicates:  No Known Allergies    Actual Body Weight:   63 kg    Renal Function:   Estimated Creatinine Clearance: 77.7 mL/min (based on SCr of 0.8 mg/dL).,       CBC (last 72 hours):  Recent Labs   Lab Result Units 12/26/24  0843 12/27/24  0454 12/28/24  0507   WBC K/uL 1.59* 1.16* 1.20*   Hemoglobin g/dL 11.4* 9.2* 9.1*   Hematocrit % 33.0* 26.3* 25.3*   Platelets K/uL 19* 14* 10*   Gran % % 59.2 62.1 49.2   Lymph % % 27.7 26.7 37.5   Mono % % 11.9 10.3 12.5   Eosinophil % % 0.6 0.0 0.0   Basophil % % 0.0 0.0 0.0   Differential Method  Automated Automated Automated       Metabolic Panel (last 72 hours):  Recent Labs   Lab Result Units 12/26/24  0843 12/26/24  1716 12/27/24  0454 12/28/24  0507   Sodium mmol/L 136  --  132* 133*   Potassium mmol/L 4.5  --  4.2 3.1*   Chloride mmol/L 103  --  102 101   CO2 mmol/L 23  --  23 22*   Glucose mg/dL 163*  --  146* 111*   Glucose, UA   --  Negative  --   --    BUN mg/dL 15  --  15 8   Creatinine mg/dL 1.2  --  0.8 0.8   Albumin g/dL 3.3*  --  2.6* 2.5*   Total Bilirubin mg/dL 0.5  --  0.5 0.6   Alkaline Phosphatase U/L 62  --  47 47   AST U/L 19  --  16 20   ALT U/L 29  --  21 21   Magnesium mg/dL 1.6  --  1.4* 1.4*   Phosphorus mg/dL 2.7  --  2.1* 2.0*       Drug levels  "(last 3 results):  No results for input(s): "VANCOMYCINRA", "VANCORANDOM", "VANCOMYCINPE", "VANCOPEAK", "VANCOMYCINTR", "VANCOTROUGH" in the last 72 hours.    Microbiologic Results:  Microbiology Results (last 7 days)       Procedure Component Value Units Date/Time    Clostridium difficile EIA [[id number]]     Order Status: Canceled Specimen: Stool     Blood culture x two cultures. Draw prior to antibiotics. [[id number]] Collected: 12/26/24 1153    Order Status: Completed Specimen: Blood from Peripheral, Antecubital, Left Updated: 12/28/24 1412     Blood Culture, Routine No Growth to date      No Growth to date      No Growth to date    Narrative:      Aerobic and anaerobic    Blood culture x two cultures. Draw prior to antibiotics. [[id number]] Collected: 12/26/24 1153    Order Status: Completed Specimen: Blood from Peripheral, Antecubital, Left Updated: 12/28/24 1412     Blood Culture, Routine No Growth to date      No Growth to date      No Growth to date    Narrative:      Aerobic and anaerobic    Stool culture **CANNOT BE ORDERED STAT** [[id number]] Collected: 12/27/24 2104    Order Status: Sent Specimen: Stool Updated: 12/27/24 2220    E. coli 0157 antigen [[id number]] Collected: 12/27/24 2104    Order Status: No result Specimen: Stool Updated: 12/27/24 2220    Clostridium difficile EIA [[id number]] Collected: 12/27/24 2104    Order Status: Canceled Specimen: Stool     Respiratory Infection Panel (PCR), Nasopharyngeal [[id number]] Collected: 12/26/24 1508    Order Status: Completed Specimen: Nasopharyngeal Swab Updated: 12/26/24 1644     Respiratory Infection Panel Source NP Swab     Adenovirus Not Detected     Coronavirus 229E, Common Cold Virus Not Detected     Coronavirus HKU1, Common Cold Virus Not Detected     Coronavirus NL63, Common Cold Virus Not Detected     Coronavirus OC43, Common Cold Virus Not Detected     Comment: The Coronavirus strains detected in this test cause the common cold.  These strains " are not the COVID-19 (novel Coronavirus)strain   associated with the respiratory disease outbreak.          SARS-CoV2 (COVID-19) Qualitative PCR Not Detected     Human Metapneumovirus Not Detected     Human Rhinovirus/Enterovirus Not Detected     Influenza A (subtypes H1, H1-2009,H3) Not Detected     Influenza B Not Detected     Parainfluenza Virus 1 Not Detected     Parainfluenza Virus 2 Not Detected     Parainfluenza Virus 3 Not Detected     Parainfluenza Virus 4 Not Detected     Respiratory Syncytial Virus Not Detected     Bordetella Parapertussis (RS2616) Not Detected     Bordetella pertussis (ptxP) Not Detected     Chlamydia pneumoniae Not Detected     Mycoplasma pneumoniae Not Detected    Narrative:      Assay not valid for lower respiratory specimens, alternate  testing required.    Influenza A & B by Molecular [[id number]] Collected: 12/26/24 1254    Order Status: Completed Specimen: Nasopharyngeal Swab Updated: 12/26/24 1350     Influenza A, Molecular Negative     Influenza B, Molecular Negative     Flu A & B Source NP

## 2024-12-28 NOTE — SUBJECTIVE & OBJECTIVE
Past Medical History:   Diagnosis Date    Anticoagulant long-term use     Coronary artery disease     Hypertension     Myelodysplastic syndrome     Peripheral vascular disease, unspecified        Past Surgical History:   Procedure Laterality Date    BONE MARROW BIOPSY Left 4/26/2023    Procedure: Biopsy-bone marrow;  Surgeon: Harry Diamond MD;  Location: [facility] OR;  Service: Oncology;  Laterality: Left;    COLONOSCOPY N/A 01/05/2022    Procedure: COLONOSCOPY Golytely Vaccinated will bring cards;  Surgeon: Dereje Simon MD;  Location: [facility] ENDO;  Service: Endoscopy;  Laterality: N/A;  Do not cancel this order    INSERTION OF LEMONS CATHETER Right 9/13/2024    Procedure: INSERTION, CATHETER, CENTRAL VENOUS, LEMONS TRIPLE LUMEN;  Surgeon: Kg Patten MD;  Location: [address];  Service: General;  Laterality: Right;       Review of patient's allergies indicates:  No Known Allergies    Medications:  Medications Prior to Admission   Medication Sig    acyclovir (ZOVIRAX) 800 MG Tab Take 1 tablet (800 mg total) by mouth 2 (two) times daily.    atovaquone (MEPRON) 750 mg/5 mL Susp oral liquid Take 10 mLs (1,500 mg total) by mouth once daily.    carvediloL (COREG) 6.25 MG tablet Take 1 tablet (6.25 mg total) by mouth 2 (two) times daily.    copper gluconate 2 mg Cap Take 2 mg by mouth once daily.    eltrombopag olamine (PROMACTA) 50 MG Tab Take 1 tablet (50 mg total) by mouth once daily.    folic acid (FOLVITE) 1 MG tablet Take 1 tablet (1 mg total) by mouth once daily.    gabapentin (NEURONTIN) 300 MG capsule Take 2 capsules (600 mg total) by mouth every evening.    letermovir (PREVYMIS) 480 mg Tab Take 1 tablet (480 mg total) by mouth Daily.    loperamide (IMODIUM A-D) 2 mg Tab Take 2 mg by mouth 4 (four) times daily as needed.    magnesium oxide (MAG-OX) 400 mg (241.3 mg magnesium) tablet Take 2 tablets (800 mg total) by mouth 2 (two) times daily.    ondansetron (ZOFRAN-ODT) 8 MG TbDL Take 1  "tablet (8 mg total) by mouth every 8 (eight) hours as needed (nausea).    pantoprazole (PROTONIX) 40 MG tablet Take 1 tablet (40 mg total) by mouth once daily.    posaconazole (NOXAFIL) 100 mg TbEC tablet Take 3 tablets (300 mg total) by mouth once daily.    rOPINIRole (REQUIP) 0.5 MG tablet Take 1 tablet (0.5 mg total) by mouth every evening.    tacrolimus (PROGRAF) 0.5 MG Cap Take 1 capsule (0.5 mg total) by mouth every morning.    traMADoL (ULTRAM) 50 mg tablet Take 1 tablet (50 mg total) by mouth every 6 (six) hours as needed for Pain.    zolpidem (AMBIEN) 5 MG Tab Take 1 tablet (5 mg total) by mouth nightly as needed (insomnia).     Antibiotics (From admission, onward)      Start     Stop Route Frequency Ordered    12/28/24 0915  mupirocin 2 % ointment         01/02/25 0859 Nasl 2 times daily 12/28/24 0803    12/28/24 0915  vancomycin 1,250 mg in 0.9% NaCl 250 mL IVPB (admixture device)         -- IV Once 12/28/24 0805    12/28/24 0859  vancomycin - pharmacy to dose  (vancomycin IVPB (PEDS and ADULTS))        Placed in "And" Linked Group    -- IV pharmacy to manage frequency 12/28/24 0801    12/27/24 1630  piperacillin-tazobactam (ZOSYN) 4.5 g in D5W 100 mL IVPB (MB+)         -- IV Every 8 hours (non-standard times) 12/27/24 1516          Antifungals (From admission, onward)      Start     Stop Route Frequency Ordered    12/26/24 1430  posaconazole EC tablet 300 mg         -- Oral Daily 12/26/24 1328          Antivirals (From admission, onward)          Stop Route Frequency     acyclovir         -- Oral 2 times daily     letermovir         -- Oral Daily             Immunization History   Administered Date(s) Administered    COVID-19, MRNA, LN-S, PF (MODERNA FULL 0.5 ML DOSE) 02/08/2021, 03/08/2021, 10/29/2021    Influenza (FLUAD) - Quadrivalent - Adjuvanted - PF *Preferred* (65+) 10/19/2021, 11/08/2023    Pneumococcal Conjugate - 13 Valent 10/19/2021    Pneumococcal Conjugate - 20 Valent 11/08/2023    RSVpreF " (Arexvy) 2023       Family History       Problem Relation (Age of Onset)    Cancer Father, Brother    Hypertension Mother    No Known Problems Daughter, Daughter, Son          Social History     Socioeconomic History    Marital status:    Tobacco Use    Smoking status: Former     Current packs/day: 0.00     Average packs/day: 0.3 packs/day for 50.0 years (12.5 ttl pk-yrs)     Types: Cigarettes     Start date: 3/1/1973     Quit date: 3/1/2023     Years since quittin.8     Passive exposure: Past    Smokeless tobacco: Never   Substance and Sexual Activity    Alcohol use: Not Currently    Drug use: Never    Sexual activity: Not Currently     Partners: Female     Social Drivers of Health     Financial Resource Strain: Low Risk  (2024)    Overall Financial Resource Strain (CARDIA)     Difficulty of Paying Living Expenses: Not hard at all   Food Insecurity: No Food Insecurity (2024)    Hunger Vital Sign     Worried About Running Out of Food in the Last Year: Never true     Ran Out of Food in the Last Year: Never true   Transportation Needs: No Transportation Needs (2024)    TRANSPORTATION NEEDS     Transportation : No   Physical Activity: Insufficiently Active (2024)    Exercise Vital Sign     Days of Exercise per Week: 2 days     Minutes of Exercise per Session: 60 min   Stress: Stress Concern Present (2024)    [place] [facility] [employer] [facility] - Occupational Stress Questionnaire     Feeling of Stress : To some extent   Housing Stability: Low Risk  (2024)    Housing Stability Vital Sign     Unable to Pay for Housing in the Last Year: No     Homeless in the Last Year: No     Review of Systems   Constitutional:  Positive for activity change, appetite change, chills, fatigue and fever.   HENT:  Negative for congestion, dental problem, mouth sores and sinus pressure.    Eyes:  Negative for pain, redness and visual disturbance.   Respiratory:  Negative for cough, shortness  of breath and wheezing.    Cardiovascular:  Negative for chest pain and leg swelling.   Gastrointestinal:  Positive for diarrhea. Negative for abdominal distention, abdominal pain, nausea and vomiting.   Endocrine: Negative for polyuria.   Genitourinary:  Negative for decreased urine volume, dysuria and frequency.   Musculoskeletal:  Negative for joint swelling.   Skin:  Negative for color change.   Allergic/Immunologic: Negative for food allergies.   Neurological:  Negative for dizziness, weakness and headaches.   Hematological:  Negative for adenopathy.   Psychiatric/Behavioral:  Negative for agitation and confusion. The patient is not nervous/anxious.      Objective:     Vital Signs (Most Recent):  Temp: 98.7 °F (37.1 °C) (12/28/24 1115)  Pulse: 108 (12/28/24 0800)  Resp: 18 (12/28/24 0800)  BP: 127/68 (12/28/24 0800)  SpO2: (!) 92 % (12/28/24 0800) Vital Signs (24h Range):  Temp:  [98.7 °F (37.1 °C)-102.9 °F (39.4 °C)] 98.7 °F (37.1 °C)  Pulse:  [108-114] 108  Resp:  [16-19] 18  SpO2:  [92 %-95 %] 92 %  BP: (122-154)/(62-93) 127/68     Weight: 63 kg (138 lb 14.2 oz)  Body mass index is 20.51 kg/m².    Estimated Creatinine Clearance: 77.7 mL/min (based on SCr of 0.8 mg/dL).     Physical Exam  Constitutional:       Appearance: He is well-developed.   HENT:      Head: Normocephalic and atraumatic.   Eyes:      Conjunctiva/sclera: Conjunctivae normal.   Cardiovascular:      Rate and Rhythm: Normal rate and regular rhythm.      Heart sounds: Normal heart sounds. No murmur heard.  Pulmonary:      Effort: Pulmonary effort is normal. No respiratory distress.      Breath sounds: Normal breath sounds. No wheezing.   Abdominal:      General: Bowel sounds are normal. There is no distension.      Palpations: Abdomen is soft.      Tenderness: There is no abdominal tenderness.   Musculoskeletal:         General: No tenderness. Normal range of motion.      Cervical back: Neck supple.   Lymphadenopathy:      Cervical: No  cervical adenopathy.   Skin:     General: Skin is warm and dry.      Findings: No rash.   Neurological:      Mental Status: He is alert and oriented to person, place, and time.      Coordination: Coordination normal.   Psychiatric:         Behavior: Behavior normal.          Significant Labs: CBC:   Recent Labs   Lab 12/27/24  0454 12/28/24  0507   WBC 1.16* 1.20*   HGB 9.2* 9.1*   HCT 26.3* 25.3*   PLT 14* 10*     CMP:   Recent Labs   Lab 12/27/24  0454 12/28/24  0507   * 133*   K 4.2 3.1*    101   CO2 23 22*   * 111*   BUN 15 8   CREATININE 0.8 0.8   CALCIUM 8.2* 7.7*   PROT 4.8* 4.9*   ALBUMIN 2.6* 2.5*   BILITOT 0.5 0.6   ALKPHOS 47 47   AST 16 20   ALT 21 21   ANIONGAP 7* 10       Significant Imaging: I have reviewed all pertinent imaging results/findings within the past 24 hours.

## 2024-12-28 NOTE — PT/OT/SLP EVAL
Physical Therapy Evaluation and Treatment     Patient Name:  Guillaume Salinas   MRN:  [id number]    Recommendations:     Discharge Recommendations: Low Intensity Therapy   Discharge Equipment Recommendations: walker, rolling   Barriers to discharge: None    Assessment:     Guillaume Salinas is a 69 y.o. male admitted with a medical diagnosis of Neutropenic fever.  He presents with the following impairments/functional limitations: weakness, impaired endurance. Pt tolerated activity with mild deconditioning, able to ambulate safely with use of a RW. Patient currently demonstrates a need for low intensity therapy on a scheduled basis secondary to a decline in functional status due to illness . Pt would continue to benefit from acute skilled therapy intervention to address deficits and progress toward prior level of function.       Rehab Prognosis: Good; patient would benefit from acute skilled PT services to address these deficits and reach maximum level of function.    Recent Surgery: * No surgery found *      Plan:     During this hospitalization, patient to be seen 2 x/week to address the identified rehab impairments via gait training, therapeutic activities, therapeutic exercises, neuromuscular re-education and progress toward the following goals:    Plan of Care Expires:  01/28/25    Subjective     Chief Complaint: no complaints   Patient/Family Comments/goals: to get better   Pain/Comfort:  Pain Rating 1: 0/10  Pain Rating Post-Intervention 1: 0/10    Patients cultural, spiritual, Oriental orthodox conflicts given the current situation: no    Living Environment:  Pt lives with his spouse in a [facility] with no [facility].   Prior to admission, patients level of function was independent with mobility and ADLs.  Equipment used at home: raised toilet, shower chair, grab bar, rollator.  DME owned (not currently used): none.  Upon discharge, patient will have assistance from family.    Objective:     Communicated with RN  prior to session.  Patient found left sidelying with  (no active lines present)  upon PT entry to room.    General Precautions: Standard, fall  Orthopedic Precautions:N/A   Braces: N/A  Respiratory Status: Room air    Exams:  Cognitive Exam:  Patient is AAOx4, followed all commands, communicates clearly and fluently  Gross Motor Coordination:  WFL  RLE ROM: WFL  RLE Strength: WFL  LLE ROM: WFL  LLE Strength: WFL    Functional Mobility:  Bed Mobility:     Supine to Sit: supervision  Transfers:     Sit to Stand:  supervision with no AD  Gait: Pt ambulated 80 ft in room, performed 60 ft with RW with supervision, 20 ft with no AD with stand by assistance. Gait stability improved with use of RW. Pt with small step size, decreased foot clearance, narrow MACARIO, slow gait speed. Pt with no LOB, no SOB, no dizziness.       AM-PAC 6 CLICK MOBILITY  Total Score:24       Treatment & Education:  Pt educated on role of PT/POC. Pt verbalized understanding.   Pt encouraged to only perform OOB mobility with assistance from nursing/therapy or family. Pt agreeable.   Pt encouraged to ambulate daily with assistance/supervision from nursing/therapy of family. Pt agreeable.      Patient left sitting edge of bed with all lines intact, call button in reach, and RN present.    GOALS:   Multidisciplinary Problems       Physical Therapy Goals          Problem: Physical Therapy    Goal Priority Disciplines Outcome Interventions   Physical Therapy Goal     PT, PT/OT Progressing    Description: Goals to be met by: 2025     Patient will increase functional independence with mobility by performin. Sit to stand transfer with [place]on  2. Gait  x 250 feet with [place] using No Assistive Device.   3. Lower extremity exercise program x10 reps , with independence                         History:     Past Medical History:   Diagnosis Date    Anticoagulant long-term use     Coronary artery disease     Hypertension     Myelodysplastic  syndrome     Peripheral vascular disease, unspecified        Past Surgical History:   Procedure Laterality Date    BONE MARROW BIOPSY Left 4/26/2023    Procedure: Biopsy-bone marrow;  Surgeon: Harry Diamond MD;  Location: [facility] OR;  Service: Oncology;  Laterality: Left;    COLONOSCOPY N/A 01/05/2022    Procedure: COLONOSCOPY Golytely Vaccinated will bring cards;  Surgeon: Dereje Simon MD;  Location: [facility];  Service: Endoscopy;  Laterality: N/A;  Do not cancel this order    INSERTION OF LEMONS CATHETER Right 9/13/2024    Procedure: INSERTION, CATHETER, CENTRAL VENOUS, LEMONS TRIPLE LUMEN;  Surgeon: Kg Patten MD;  Location: [address];  Service: General;  Laterality: Right;       Time Tracking:     PT Received On: 12/28/24  PT Start Time: 1117     PT Stop Time: 1137  PT Total Time (min): 20 min     Billable Minutes: Evaluation 8 mins  and Therapeutic Activity 12 mins       12/28/2024

## 2024-12-28 NOTE — PLAN OF CARE
No acute events this shift. Patient AAOx4. Standby assist. T-max of 100.5. MD aware. Temp not sustained. All other VSS. Special contact precautions in place. Stool sample collected. Awaiting results. x5 loose-watery BMs this shift. Patient offered bedside commode. Patient refused. Patient complaint of nausea. PRN Zofran administered x1. Moderate relief obtained. Patient complaint of sleeplessness. PRN Ambien administered x1. Moderate relief obtained. IV fluids and abx continued as ordered. Wife and daughter remain at bedside. Bed in lowest position and locked. Side rails up x2. All possessions and call light within reach. Non-skid socks worn. Instructed to call for assistance and voiced understanding. All needs of patient currently met. Will continue to monitor with frequent rounding.

## 2024-12-28 NOTE — HPI
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 Pancytopenia, CT c/a/p with New left lower lobe consolidation and parapneumonic effusion and trace pericholecystic fluid without significant gallbladder wall thickening or gallstones, LFTs normal, RIP negative    Of note pt recently was gardening

## 2024-12-28 NOTE — PROGRESS NOTES
Janusz Ma - Oncology ([facility])  Hematology  Bone Marrow Transplant  Progress Note    Patient Name: Guillaume Salinas  Admission Date: 12/26/2024    Code Status: Full Code    Subjective:     Interval History: 100 from JEAN haplo; weak, tired, diarrhea x 4 overnight.   Febrile.  Wife and daughter at bedside.     Objective:        Vitals:    12/28/24 1115   BP:    Pulse:    Resp:    Temp: 98.7 °F (37.1 °C)            Physical Exam  Vitals and nursing note reviewed.   Constitutional:       Appearance: He is well-developed. He is ill-appearing.   HENT:      Head: Normocephalic and atraumatic.      Right Ear: External ear normal.      Left Ear: External ear normal.      Mouth/Throat:      Mouth: Mucous membranes are dry.      Pharynx: No oropharyngeal exudate.   Eyes:      Extraocular Movements: Extraocular movements intact.      Conjunctiva/sclera: Conjunctivae normal.   Cardiovascular:      Rate and Rhythm: Normal rate and regular rhythm.      Pulses: Normal pulses.      Heart sounds: Normal heart sounds. No murmur heard.  Pulmonary:      Effort: Pulmonary effort is normal. No respiratory distress.      Breath sounds: No stridor. Examination of the left-lower field reveals decreased breath sounds. Decreased breath sounds present. No wheezing or rales.   Abdominal:      General: Bowel sounds are normal.      Palpations: Abdomen is soft.      Tenderness: There is no abdominal tenderness.   Musculoskeletal:         General: Normal range of motion.      Cervical back: Normal range of motion and neck supple.      Right lower leg: No edema.      Left lower leg: No edema.   Skin:     General: Skin is warm and dry.      Findings: No bruising, erythema or rash.      Comments: Right triple lumen vaughn clean and dry with no signs of infection   Neurological:      General: No focal deficit present.      Mental Status: He is alert and oriented to person, place, and time.      Motor: Weakness present.   Psychiatric:          Mood and Affect: Mood normal.         Behavior: Behavior normal.         Thought Content: Thought content normal.         Judgment: Judgment normal.            Significant Labs:      Lab Results   Component Value Date    WBC 1.20 (LL) 12/28/2024    HGB 9.1 (L) 12/28/2024    HCT 25.3 (L) 12/28/2024     (H) 12/28/2024    PLT 10 (LL) 12/28/2024       Gran # (ANC)   Date Value Ref Range Status   12/28/2024 0.6 (L) 1.8 - 7.7 K/uL Final     Gran %   Date Value Ref Range Status   12/28/2024 49.2 38.0 - 73.0 % Final     CMP  Sodium   Date Value Ref Range Status   12/28/2024 133 (L) 136 - 145 mmol/L Final     Potassium   Date Value Ref Range Status   12/28/2024 3.1 (L) 3.5 - 5.1 mmol/L Final     Chloride   Date Value Ref Range Status   12/28/2024 101 95 - 110 mmol/L Final     CO2   Date Value Ref Range Status   12/28/2024 22 (L) 23 - 29 mmol/L Final     Glucose   Date Value Ref Range Status   12/28/2024 111 (H) 70 - 110 mg/dL Final     BUN   Date Value Ref Range Status   12/28/2024 8 8 - 23 mg/dL Final     Creatinine   Date Value Ref Range Status   12/28/2024 0.8 0.5 - 1.4 mg/dL Final     Calcium   Date Value Ref Range Status   12/28/2024 7.7 (L) 8.7 - 10.5 mg/dL Final     Total Protein   Date Value Ref Range Status   12/28/2024 4.9 (L) 6.0 - 8.4 g/dL Final     Albumin   Date Value Ref Range Status   12/28/2024 2.5 (L) 3.5 - 5.2 g/dL Final     Total Bilirubin   Date Value Ref Range Status   12/28/2024 0.6 0.1 - 1.0 mg/dL Final     Comment:     For infants and newborns, interpretation of results should be based  on gestational age, weight and in agreement with clinical  observations.    Premature Infant recommended reference ranges:  Up to 24 hours.............<8.0 mg/dL  Up to 48 hours............<12.0 mg/dL  3-5 days..................<15.0 mg/dL  6-29 days.................<15.0 mg/dL       Alkaline Phosphatase   Date Value Ref Range Status   12/28/2024 47 40 - 150 U/L Final     AST   Date Value Ref Range Status    12/28/2024 20 10 - 40 U/L Final     ALT   Date Value Ref Range Status   12/28/2024 21 10 - 44 U/L Final     Anion Gap   Date Value Ref Range Status   12/28/2024 10 8 - 16 mmol/L Final     eGFR   Date Value Ref Range Status   12/28/2024 >60.0 >60 mL/min/1.73 m^2 Final     Cytomegalovirus PCR, Quant   Date Value Ref Range Status   12/26/2024 Not Detected <50 IU/mL Final   12/23/2024 Not Detected <50 IU/mL Final   12/19/2024 Not Detected <50 IU/mL Final       Tacrolimus Lvl   Date Value Ref Range Status   12/28/2024 8.1 5.0 - 15.0 ng/mL Final     Comment:     Testing performed by a chemiluminescent microparticle   immunoassay on the [employer] System.    CAUTION: No firm therapeutic range exists for tacrolimus in whole   blood. The   complexity of the clinical state, individual differences in   sensitivity to   immunosuppressive and nephrotoxic effects of tacrolimus,   co-administration   of other immunosuppressants, type of transplant, time post-transplant   and a   number of other factors contribute to different requirements for   optimal   blood levels of tacrolimus. Therefore, individual tacrolimus values   cannot   be used as the sole indicator for making changes in treatment regimen   and   each patient should be thoroughly evaluated clinically before changes   in   treatment regimens are made. Each user must establish his or her own   ranges   based on clinical experience.  Therapeutic ranges vary according to the commercial test used, and   therefore   should be established for each commercial test. Values obtained with   different assay methods cannot be used interchangeably due to   differences in   assay methods and cross-reactivity with metabolites, nor should   correction   factors be applied. Therefore, consistent use of one assay for   individual   patients is recommended.     12/27/2024 8.2 5.0 - 15.0 ng/mL Final     Comment:     Testing performed by a chemiluminescent microparticle    immunoassay on the [employer] System.    CAUTION: No firm therapeutic range exists for tacrolimus in whole   blood. The   complexity of the clinical state, individual differences in   sensitivity to   immunosuppressive and nephrotoxic effects of tacrolimus,   co-administration   of other immunosuppressants, type of transplant, time post-transplant   and a   number of other factors contribute to different requirements for   optimal   blood levels of tacrolimus. Therefore, individual tacrolimus values   cannot   be used as the sole indicator for making changes in treatment regimen   and   each patient should be thoroughly evaluated clinically before changes   in   treatment regimens are made. Each user must establish his or her own   ranges   based on clinical experience.  Therapeutic ranges vary according to the commercial test used, and   therefore   should be established for each commercial test. Values obtained with   different assay methods cannot be used interchangeably due to   differences in   assay methods and cross-reactivity with metabolites, nor should   correction   factors be applied. Therefore, consistent use of one assay for   individual   patients is recommended.     12/26/2024 13.1 5.0 - 15.0 ng/mL Final     Comment:     Testing performed by a chemiluminescent microparticle   immunoassay on the [employer] System.    CAUTION: No firm therapeutic range exists for tacrolimus in whole   blood. The   complexity of the clinical state, individual differences in   sensitivity to   immunosuppressive and nephrotoxic effects of tacrolimus,   co-administration   of other immunosuppressants, type of transplant, time post-transplant   and a   number of other factors contribute to different requirements for   optimal   blood levels of tacrolimus. Therefore, individual tacrolimus values   cannot   be used as the sole indicator for making changes in treatment regimen   and   each patient should be  thoroughly evaluated clinically before changes   in   treatment regimens are made. Each user must establish his or her own   ranges   based on clinical experience.  Therapeutic ranges vary according to the commercial test used, and   therefore   should be established for each commercial test. Values obtained with   different assay methods cannot be used interchangeably due to   differences in   assay methods and cross-reactivity with metabolites, nor should   correction   factors be applied. Therefore, consistent use of one assay for   individual   patients is recommended.         Diagnostic Results:  I have reviewed all pertinent imaging results/findings within the past 24 hours.  Assessment/Plan:     * Neutropenic fever  - remains febrile  -likely from PNA  -cholecystitis ruled out  -abx per ID    S/P allogeneic bone marrow transplant  Status post haploidentical stem cell transplantation conditioned with FluMel 100 + PTCy+ 2Gy TBI . Currently Day+ 100  Engrafted on 10/09/24 day +20.  Day 30 bone marrow (11/5/2024) showing mildly hypercellular marrow with no increased blast (0.3%) and no evidence of myeloid neoplasm. Pending chimerisms, NGS, and CG  Day 100 bone marrow biopsy to be moved up early to Monday 12/30/24 inpt; see below   Maintain central line    -maintain current tacro dose   -repeat daily levels  - continue cmv ppx with letermovir    Myelodysplastic syndrome  - Status post treatment with azacitidine 75 mg/m2 daily x7 days plus venetoclax 100mg (voriconazole) daily x14 of 28 days.Venetoclax decreased to 7 days with cycle 3 until completion of 11 cycles. S/p Haplo BMT as above. No plans for maintenance at this time.     Pancytopenia  Worsened; multifatorial  Continue copper sulfate  Concern for HLH given ebv titers and ferritin   Trend ferritin  Check coags, fibrinogen, SIL2R, and triglycerides with labs tomorrow  Urgent marrow biopsy Monday 12/30  Discuss steroids with Dr. 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 He started budesonide 3mg TID on 10/28/24. Due to persistent nausea despite budesonide so started systemic steroids 11/1 at 0.5 mg/kg and his steroid taper has been given to him. Steroid taper completed 12/8/24.  - see tacro dosing under allogeneic BMT  -if diarrhea persists may need flex sig in coming days     Folate deficiency  - continue home folate    Copper deficiency  - continue home copper    RLS (restless legs syndrome)  - Continue home ropinirole     Hypotension  Likely dehydration   Continue IVF support         Insomnia  - Continue home Ambien    Hypertension, essential  - holding home antihypertensives given hypotension on presentation    Physical debility  - PT/OT consulted on admit; appreciate recs        VTE Risk Mitigation (From admission, onward)           Ordered     Reason for No Pharmacological VTE Prophylaxis  Once        Question:  Reasons:  Answer:  Thrombocytopenia    12/26/24 1330     IP VTE HIGH RISK PATIENT  Once         12/26/24 1330     Place sequential compression device  Until discontinued         12/26/24 1330                    Disposition: Remains inpatient  Inder Best MD  Hematology & Stem Cell Transplant

## 2024-12-28 NOTE — ASSESSMENT & PLAN NOTE
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 Pancytopenia, CT c/a/p with New left lower lobe consolidation and parapneumonic effusion and trace pericholecystic fluid without significant gallbladder wall thickening or gallstones, LFTs normal, RIP negative.  Of note he has had significant EBV reactivation  - agree with current antimicrobials empirically for pneumonia  - f/u CDiff, if positive start fidaxomicin, if negative start PO vancomycin ppx 125mg BID  - other ppx per SCT protocol  - discussed with team, daughter and wife at length, will follow

## 2024-12-29 LAB
ALBUMIN SERPL BCP-MCNC: 2.3 G/DL (ref 3.5–5.2)
ALP SERPL-CCNC: 41 U/L (ref 40–150)
ALT SERPL W/O P-5'-P-CCNC: 29 U/L (ref 10–44)
ANION GAP SERPL CALC-SCNC: 8 MMOL/L (ref 8–16)
ANISOCYTOSIS BLD QL SMEAR: SLIGHT
APTT PPP: 34.5 SEC (ref 21–32)
AST SERPL-CCNC: 29 U/L (ref 10–40)
BASOPHILS # BLD AUTO: 0 K/UL (ref 0–0.2)
BASOPHILS NFR BLD: 0 % (ref 0–1.9)
BILIRUB SERPL-MCNC: 0.5 MG/DL (ref 0.1–1)
BLD PROD TYP BPU: NORMAL
BLOOD UNIT EXPIRATION DATE: NORMAL
BLOOD UNIT TYPE CODE: 8400
BLOOD UNIT TYPE: NORMAL
BUN SERPL-MCNC: 7 MG/DL (ref 8–23)
C DIFF GDH STL QL: NEGATIVE
C DIFF TOX A+B STL QL IA: NEGATIVE
CALCIUM SERPL-MCNC: 7.6 MG/DL (ref 8.7–10.5)
CHLORIDE SERPL-SCNC: 104 MMOL/L (ref 95–110)
CO2 SERPL-SCNC: 22 MMOL/L (ref 23–29)
CODING SYSTEM: NORMAL
CREAT SERPL-MCNC: 1 MG/DL (ref 0.5–1.4)
CROSSMATCH INTERPRETATION: NORMAL
DIFFERENTIAL METHOD BLD: ABNORMAL
DISPENSE STATUS: NORMAL
E COLI SXT1 STL QL IA: NEGATIVE
E COLI SXT2 STL QL IA: NEGATIVE
EOSINOPHIL # BLD AUTO: 0 K/UL (ref 0–0.5)
EOSINOPHIL NFR BLD: 0 % (ref 0–8)
ERYTHROCYTE [DISTWIDTH] IN BLOOD BY AUTOMATED COUNT: 15.6 % (ref 11.5–14.5)
EST. GFR  (NO RACE VARIABLE): >60 ML/MIN/1.73 M^2
FERRITIN SERPL-MCNC: ABNORMAL NG/ML (ref 20–300)
FIBRINOGEN PPP-MCNC: 503 MG/DL (ref 182–400)
GLUCOSE SERPL-MCNC: 122 MG/DL (ref 70–110)
HCT VFR BLD AUTO: 23.6 % (ref 40–54)
HGB BLD-MCNC: 8.3 G/DL (ref 14–18)
IMM GRANULOCYTES # BLD AUTO: 0.01 K/UL (ref 0–0.04)
IMM GRANULOCYTES NFR BLD AUTO: 1.1 % (ref 0–0.5)
INR PPP: 1.2 (ref 0.8–1.2)
LYMPHOCYTES # BLD AUTO: 0.5 K/UL (ref 1–4.8)
LYMPHOCYTES NFR BLD: 53.3 % (ref 18–48)
MAGNESIUM SERPL-MCNC: 1.5 MG/DL (ref 1.6–2.6)
MCH RBC QN AUTO: 37.9 PG (ref 27–31)
MCHC RBC AUTO-ENTMCNC: 35.2 G/DL (ref 32–36)
MCV RBC AUTO: 108 FL (ref 82–98)
MONOCYTES # BLD AUTO: 0.1 K/UL (ref 0.3–1)
MONOCYTES NFR BLD: 10 % (ref 4–15)
MRSA SCREEN BY PCR: NOT DETECTED
NEUTROPHILS # BLD AUTO: 0.3 K/UL (ref 1.8–7.7)
NEUTROPHILS NFR BLD: 35.6 % (ref 38–73)
NRBC BLD-RTO: 0 /100 WBC
OVALOCYTES BLD QL SMEAR: ABNORMAL
PHOSPHATE SERPL-MCNC: 1.8 MG/DL (ref 2.7–4.5)
PLATELET # BLD AUTO: 8 K/UL (ref 150–450)
PLATELET BLD QL SMEAR: ABNORMAL
PMV BLD AUTO: ABNORMAL FL (ref 9.2–12.9)
POIKILOCYTOSIS BLD QL SMEAR: SLIGHT
POLYCHROMASIA BLD QL SMEAR: ABNORMAL
POTASSIUM SERPL-SCNC: 2.5 MMOL/L (ref 3.5–5.1)
PROT SERPL-MCNC: 4.5 G/DL (ref 6–8.4)
PROTHROMBIN TIME: 12.8 SEC (ref 9–12.5)
RBC # BLD AUTO: 2.19 M/UL (ref 4.6–6.2)
SODIUM SERPL-SCNC: 134 MMOL/L (ref 136–145)
SPHEROCYTES BLD QL SMEAR: ABNORMAL
TRIGL SERPL-MCNC: 252 MG/DL (ref 30–150)
UNIT NUMBER: NORMAL
WBC # BLD AUTO: 0.9 K/UL (ref 3.9–12.7)

## 2024-12-29 PROCEDURE — 84478 ASSAY OF TRIGLYCERIDES: CPT | Performed by: INTERNAL MEDICINE

## 2024-12-29 PROCEDURE — 83735 ASSAY OF MAGNESIUM: CPT | Performed by: INTERNAL MEDICINE

## 2024-12-29 PROCEDURE — 85610 PROTHROMBIN TIME: CPT | Performed by: INTERNAL MEDICINE

## 2024-12-29 PROCEDURE — 25000003 PHARM REV CODE 250: Performed by: INTERNAL MEDICINE

## 2024-12-29 PROCEDURE — 93010 ELECTROCARDIOGRAM REPORT: CPT | Mod: ,,, | Performed by: INTERNAL MEDICINE

## 2024-12-29 PROCEDURE — 82728 ASSAY OF FERRITIN: CPT | Performed by: INTERNAL MEDICINE

## 2024-12-29 PROCEDURE — 63600175 PHARM REV CODE 636 W HCPCS: Performed by: NURSE PRACTITIONER

## 2024-12-29 PROCEDURE — 25000003 PHARM REV CODE 250: Performed by: NURSE PRACTITIONER

## 2024-12-29 PROCEDURE — 80053 COMPREHEN METABOLIC PANEL: CPT | Performed by: INTERNAL MEDICINE

## 2024-12-29 PROCEDURE — 99233 SBSQ HOSP IP/OBS HIGH 50: CPT | Mod: ,,, | Performed by: INTERNAL MEDICINE

## 2024-12-29 PROCEDURE — 93005 ELECTROCARDIOGRAM TRACING: CPT

## 2024-12-29 PROCEDURE — 83520 IMMUNOASSAY QUANT NOS NONAB: CPT | Performed by: INTERNAL MEDICINE

## 2024-12-29 PROCEDURE — 63600175 PHARM REV CODE 636 W HCPCS: Performed by: INTERNAL MEDICINE

## 2024-12-29 PROCEDURE — 25000003 PHARM REV CODE 250: Performed by: STUDENT IN AN ORGANIZED HEALTH CARE EDUCATION/TRAINING PROGRAM

## 2024-12-29 PROCEDURE — 87641 MR-STAPH DNA AMP PROBE: CPT | Performed by: INTERNAL MEDICINE

## 2024-12-29 PROCEDURE — 85384 FIBRINOGEN ACTIVITY: CPT | Performed by: INTERNAL MEDICINE

## 2024-12-29 PROCEDURE — 85730 THROMBOPLASTIN TIME PARTIAL: CPT | Performed by: INTERNAL MEDICINE

## 2024-12-29 PROCEDURE — 85025 COMPLETE CBC W/AUTO DIFF WBC: CPT | Performed by: INTERNAL MEDICINE

## 2024-12-29 PROCEDURE — 84100 ASSAY OF PHOSPHORUS: CPT | Performed by: INTERNAL MEDICINE

## 2024-12-29 PROCEDURE — 20600001 HC STEP DOWN PRIVATE ROOM

## 2024-12-29 PROCEDURE — P9037 PLATE PHERES LEUKOREDU IRRAD: HCPCS | Performed by: STUDENT IN AN ORGANIZED HEALTH CARE EDUCATION/TRAINING PROGRAM

## 2024-12-29 RX ORDER — HEPARIN 100 UNIT/ML
500 SYRINGE INTRAVENOUS
Status: DISCONTINUED | OUTPATIENT
Start: 2024-12-29 | End: 2025-01-06 | Stop reason: HOSPADM

## 2024-12-29 RX ORDER — HYDROCODONE BITARTRATE AND ACETAMINOPHEN 500; 5 MG/1; MG/1
TABLET ORAL
Status: DISCONTINUED | OUTPATIENT
Start: 2024-12-29 | End: 2024-12-31

## 2024-12-29 RX ORDER — ACETAMINOPHEN 325 MG/1
650 TABLET ORAL ONCE
Status: COMPLETED | OUTPATIENT
Start: 2024-12-29 | End: 2024-12-29

## 2024-12-29 RX ORDER — DIPHENHYDRAMINE HYDROCHLORIDE 50 MG/ML
50 INJECTION INTRAMUSCULAR; INTRAVENOUS ONCE AS NEEDED
Status: COMPLETED | OUTPATIENT
Start: 2024-12-29 | End: 2024-12-30

## 2024-12-29 RX ORDER — ACETAMINOPHEN 325 MG/1
650 TABLET ORAL
Status: COMPLETED | OUTPATIENT
Start: 2024-12-29 | End: 2024-12-29

## 2024-12-29 RX ORDER — HEPARIN 100 UNIT/ML
500 SYRINGE INTRAVENOUS
OUTPATIENT
Start: 2025-01-05

## 2024-12-29 RX ORDER — MEPERIDINE HYDROCHLORIDE 50 MG/ML
25 INJECTION INTRAMUSCULAR; INTRAVENOUS; SUBCUTANEOUS
Status: COMPLETED | OUTPATIENT
Start: 2024-12-29 | End: 2024-12-29

## 2024-12-29 RX ORDER — ACETAMINOPHEN 325 MG/1
650 TABLET ORAL
OUTPATIENT
Start: 2025-01-05

## 2024-12-29 RX ORDER — EPINEPHRINE 0.3 MG/.3ML
0.3 INJECTION SUBCUTANEOUS ONCE AS NEEDED
OUTPATIENT
Start: 2025-01-05

## 2024-12-29 RX ORDER — DIPHENHYDRAMINE HCL 25 MG
25 CAPSULE ORAL ONCE
Status: COMPLETED | OUTPATIENT
Start: 2024-12-29 | End: 2024-12-29

## 2024-12-29 RX ORDER — EPINEPHRINE 1 MG/ML
0.3 INJECTION, SOLUTION, CONCENTRATE INTRAVENOUS ONCE AS NEEDED
Status: DISCONTINUED | OUTPATIENT
Start: 2024-12-29 | End: 2025-01-06 | Stop reason: HOSPADM

## 2024-12-29 RX ORDER — FAMOTIDINE 10 MG/ML
20 INJECTION INTRAVENOUS
Status: COMPLETED | OUTPATIENT
Start: 2024-12-29 | End: 2024-12-29

## 2024-12-29 RX ORDER — MEPERIDINE HYDROCHLORIDE 50 MG/ML
25 INJECTION INTRAMUSCULAR; INTRAVENOUS; SUBCUTANEOUS
OUTPATIENT
Start: 2025-01-05

## 2024-12-29 RX ORDER — SODIUM CHLORIDE 0.9 % (FLUSH) 0.9 %
10 SYRINGE (ML) INJECTION
OUTPATIENT
Start: 2025-01-05

## 2024-12-29 RX ORDER — DIPHENHYDRAMINE HYDROCHLORIDE 50 MG/ML
50 INJECTION INTRAMUSCULAR; INTRAVENOUS ONCE AS NEEDED
OUTPATIENT
Start: 2025-01-05

## 2024-12-29 RX ORDER — LOPERAMIDE HYDROCHLORIDE 2 MG/1
2 CAPSULE ORAL 4 TIMES DAILY PRN
Status: DISCONTINUED | OUTPATIENT
Start: 2024-12-29 | End: 2024-12-30

## 2024-12-29 RX ORDER — POTASSIUM CHLORIDE 20 MEQ/1
40 TABLET, EXTENDED RELEASE ORAL EVERY 4 HOURS
Status: COMPLETED | OUTPATIENT
Start: 2024-12-29 | End: 2024-12-29

## 2024-12-29 RX ORDER — SODIUM CHLORIDE 0.9 % (FLUSH) 0.9 %
10 SYRINGE (ML) INJECTION
Status: DISCONTINUED | OUTPATIENT
Start: 2024-12-29 | End: 2025-01-06 | Stop reason: HOSPADM

## 2024-12-29 RX ORDER — FAMOTIDINE 10 MG/ML
20 INJECTION INTRAVENOUS
OUTPATIENT
Start: 2025-01-05

## 2024-12-29 RX ADMIN — Medication 400 MG: at 09:12

## 2024-12-29 RX ADMIN — MUPIROCIN: 20 OINTMENT TOPICAL at 09:12

## 2024-12-29 RX ADMIN — MEPERIDINE HYDROCHLORIDE 25 MG: 50 INJECTION INTRAMUSCULAR; INTRAVENOUS; SUBCUTANEOUS at 08:12

## 2024-12-29 RX ADMIN — VANCOMYCIN HYDROCHLORIDE 125 MG: KIT at 09:12

## 2024-12-29 RX ADMIN — ACETAMINOPHEN 650 MG: 325 TABLET ORAL at 02:12

## 2024-12-29 RX ADMIN — ACETAMINOPHEN 650 MG: 325 TABLET ORAL at 08:12

## 2024-12-29 RX ADMIN — GABAPENTIN 600 MG: 300 CAPSULE ORAL at 09:12

## 2024-12-29 RX ADMIN — PANTOPRAZOLE SODIUM 40 MG: 40 TABLET, DELAYED RELEASE ORAL at 08:12

## 2024-12-29 RX ADMIN — ACYCLOVIR 800 MG: 800 TABLET ORAL at 09:12

## 2024-12-29 RX ADMIN — TACROLIMUS 0.5 MG: 0.5 CAPSULE ORAL at 08:12

## 2024-12-29 RX ADMIN — POTASSIUM CHLORIDE 40 MEQ: 1500 TABLET, EXTENDED RELEASE ORAL at 01:12

## 2024-12-29 RX ADMIN — VANCOMYCIN HYDROCHLORIDE 125 MG: KIT at 01:12

## 2024-12-29 RX ADMIN — Medication 400 MG: at 06:12

## 2024-12-29 RX ADMIN — POSACONAZOLE 300 MG: 100 TABLET, DELAYED RELEASE ORAL at 08:12

## 2024-12-29 RX ADMIN — DIPHENHYDRAMINE HYDROCHLORIDE 25 MG: 25 CAPSULE ORAL at 02:12

## 2024-12-29 RX ADMIN — ACETAMINOPHEN 650 MG: 325 TABLET ORAL at 09:12

## 2024-12-29 RX ADMIN — Medication 2 MG: at 08:12

## 2024-12-29 RX ADMIN — ATOVAQUONE 1500 MG: 750 SUSPENSION ORAL at 08:12

## 2024-12-29 RX ADMIN — POTASSIUM CHLORIDE 40 MEQ: 1500 TABLET, EXTENDED RELEASE ORAL at 11:12

## 2024-12-29 RX ADMIN — MUPIROCIN: 20 OINTMENT TOPICAL at 08:12

## 2024-12-29 RX ADMIN — ACYCLOVIR 800 MG: 800 TABLET ORAL at 08:12

## 2024-12-29 RX ADMIN — FOLIC ACID 1 MG: 1 TABLET ORAL at 08:12

## 2024-12-29 RX ADMIN — VANCOMYCIN HYDROCHLORIDE 1250 MG: 1.25 INJECTION, POWDER, LYOPHILIZED, FOR SOLUTION INTRAVENOUS at 01:12

## 2024-12-29 RX ADMIN — Medication 2 TABLET: at 09:12

## 2024-12-29 RX ADMIN — ROPINIROLE HYDROCHLORIDE 0.5 MG: 0.25 TABLET, FILM COATED ORAL at 09:12

## 2024-12-29 RX ADMIN — LETERMOVIR 480 MG: 480 TABLET, FILM COATED ORAL at 05:12

## 2024-12-29 RX ADMIN — FAMOTIDINE 20 MG: 10 INJECTION, SOLUTION INTRAVENOUS at 05:12

## 2024-12-29 RX ADMIN — PIPERACILLIN SODIUM AND TAZOBACTAM SODIUM 4.5 G: 4; .5 INJECTION, POWDER, LYOPHILIZED, FOR SOLUTION INTRAVENOUS at 09:12

## 2024-12-29 RX ADMIN — ZOLPIDEM TARTRATE 5 MG: 5 TABLET, FILM COATED ORAL at 09:12

## 2024-12-29 RX ADMIN — POTASSIUM CHLORIDE 40 MEQ: 1500 TABLET, EXTENDED RELEASE ORAL at 05:12

## 2024-12-29 RX ADMIN — Medication 2 TABLET: at 01:12

## 2024-12-29 RX ADMIN — POTASSIUM CHLORIDE 40 MEQ: 1500 TABLET, EXTENDED RELEASE ORAL at 10:12

## 2024-12-29 RX ADMIN — PIPERACILLIN SODIUM AND TAZOBACTAM SODIUM 4.5 G: 4; .5 INJECTION, POWDER, LYOPHILIZED, FOR SOLUTION INTRAVENOUS at 11:12

## 2024-12-29 RX ADMIN — SODIUM CHLORIDE 600 MG: 9 INJECTION, SOLUTION INTRAVENOUS at 06:12

## 2024-12-29 RX ADMIN — DIPHENHYDRAMINE HYDROCHLORIDE 50 MG: 50 INJECTION INTRAMUSCULAR; INTRAVENOUS at 09:12

## 2024-12-29 RX ADMIN — Medication 2 TABLET: at 06:12

## 2024-12-29 RX ADMIN — LOPERAMIDE HYDROCHLORIDE 2 MG: 2 CAPSULE ORAL at 09:12

## 2024-12-29 RX ADMIN — PIPERACILLIN SODIUM AND TAZOBACTAM SODIUM 4.5 G: 4; .5 INJECTION, POWDER, LYOPHILIZED, FOR SOLUTION INTRAVENOUS at 03:12

## 2024-12-29 RX ADMIN — LOPERAMIDE HYDROCHLORIDE 2 MG: 2 CAPSULE ORAL at 11:12

## 2024-12-29 RX ADMIN — LOPERAMIDE HYDROCHLORIDE 2 MG: 2 CAPSULE ORAL at 01:12

## 2024-12-29 NOTE — PLAN OF CARE
Plan of care assumed from [id number] .      Day + 101 from JEAN haplo for MDS . Plan of care reviewed with patient .     - Dx neutropenic fever   - A&O x 4   - Tmax 102.4 this shift team BMT aware , tylenol administered .   - Electrolytes replaced .   - Multiple loose bowel movement , patient came c.diff negative , PRN Imodium administered .   - Platelet 8 k one unit of platelet transfused , premeds administered , tolerated well . Consent on chart .   -  Regular diet with very poor appetite .  - Right chest wall triple lumen , dressing C/D/I   - NS infusing 100 ml/hr   - Respiratory infection panel PCR sent result due .      Frequent patient round done . Bed in low and locked position . Call light and personal times within a reach . Bed alarm on . Family at the beds side .  Plan of care ongoing .

## 2024-12-29 NOTE — PROGRESS NOTES
12/29/24 0745   Vital Signs   Temp (!) 102.4 °F (39.1 °C)   Temp Source Oral   Pulse 110   Heart Rate Source Monitor   Resp 18   SpO2 (!) 93 %   Device (Oxygen Therapy) room air   /78   MAP (mmHg) 99   BP Location Right arm   BP Method Automatic   Patient Position Lying     Temp informed to Team BMT and MD [place] PRN tylenol administered .

## 2024-12-29 NOTE — CLINICAL REVIEW
RAPID RESPONSE NURSE ROUND       Rounding completed with charge RNEllie for fever reports continues current plan of care, antipyretics administered as ordered. No additional concerns verbalized at this time. Instructed to call [phone] for further concerns or assistance.

## 2024-12-29 NOTE — SUBJECTIVE & OBJECTIVE
Interval History: remains febrile, ferritin 10K    Review of Systems   Constitutional:  Positive for activity change, appetite change, chills, fatigue and fever.   HENT:  Negative for congestion, dental problem, mouth sores and sinus pressure.    Eyes:  Negative for pain, redness and visual disturbance.   Respiratory:  Negative for cough, shortness of breath and wheezing.    Cardiovascular:  Negative for chest pain and leg swelling.   Gastrointestinal:  Positive for diarrhea. Negative for abdominal distention, abdominal pain, nausea and vomiting.   Endocrine: Negative for polyuria.   Genitourinary:  Negative for decreased urine volume, dysuria and frequency.   Musculoskeletal:  Negative for joint swelling.   Skin:  Negative for color change.   Allergic/Immunologic: Negative for food allergies.   Neurological:  Negative for dizziness, weakness and headaches.   Hematological:  Negative for adenopathy.   Psychiatric/Behavioral:  Negative for agitation and confusion. The patient is not nervous/anxious.      Objective:     Vital Signs (Most Recent):  Temp: (!) 100.9 °F (38.3 °C) (12/29/24 0510)  Pulse: (!) 112 (12/29/24 0510)  Resp: 19 (12/29/24 0510)  BP: 104/70 (12/29/24 0510)  SpO2: (!) 92 % (12/29/24 0510) Vital Signs (24h Range):  Temp:  [98.7 °F (37.1 °C)-102.9 °F (39.4 °C)] 100.9 °F (38.3 °C)  Pulse:  [106-112] 112  Resp:  [16-19] 19  SpO2:  [92 %-97 %] 92 %  BP: (104-127)/(64-76) 104/70     Weight: 63 kg (138 lb 14.2 oz)  Body mass index is 20.51 kg/m².    Estimated Creatinine Clearance: 62.1 mL/min (based on SCr of 1 mg/dL).     Physical Exam  Constitutional:       Appearance: He is well-developed.   HENT:      Head: Normocephalic and atraumatic.   Eyes:      Conjunctiva/sclera: Conjunctivae normal.   Cardiovascular:      Rate and Rhythm: Normal rate and regular rhythm.      Heart sounds: Normal heart sounds. No murmur heard.  Pulmonary:      Effort: Pulmonary effort is normal. No respiratory distress.       Breath sounds: Normal breath sounds. No wheezing.   Abdominal:      General: Bowel sounds are normal. There is no distension.      Palpations: Abdomen is soft.      Tenderness: There is no abdominal tenderness.   Musculoskeletal:         General: No tenderness. Normal range of motion.      Cervical back: Neck supple.   Lymphadenopathy:      Cervical: No cervical adenopathy.   Skin:     General: Skin is warm and dry.      Findings: No rash.   Neurological:      Mental Status: He is alert and oriented to person, place, and time.      Coordination: Coordination normal.   Psychiatric:         Behavior: Behavior normal.          Significant Labs: CBC:   Recent Labs   Lab 12/28/24  0507 12/29/24  0350   WBC 1.20* 0.90*   HGB 9.1* 8.3*   HCT 25.3* 23.6*   PLT 10*  --      CMP:   Recent Labs   Lab 12/28/24  0507 12/29/24  0350   * 134*   K 3.1* 2.5*    104   CO2 22* 22*   * 122*   BUN 8 7*   CREATININE 0.8 1.0   CALCIUM 7.7* 7.6*   PROT 4.9* 4.5*   ALBUMIN 2.5* 2.3*   BILITOT 0.6 0.5   ALKPHOS 47 41   AST 20 29   ALT 21 29   ANIONGAP 10 8       Significant Imaging: I have reviewed all pertinent imaging results/findings within the past 24 hours.

## 2024-12-29 NOTE — PLAN OF CARE
POC reviewed w patient and family at beginning of shift and PRN. AAOX4. Family remain at the bedside. Standby assist.  VSS.  2 watery bowel movements this shift.Tmax 100.9 Isolation precautions in place until stool can be collected and  tested.Protocol is to wait 48 hours after start of new abx. Reports of Nausea x1 resolved w compazine x1 dose. NS infusing @ 100cc/h. Bed locked in low position call light in reach.Up w assist. Will CTM.Lytes replacements ordered. Preplatelets 8K

## 2024-12-29 NOTE — PROGRESS NOTES
Janusz Ma - Oncology ([facility])  Hematology  Bone Marrow Transplant  Progress Note    Patient Name: Guillaume Salinas  Admission Date: 12/26/2024    Code Status: Full Code    Subjective:     Interval History: 101 from JEAN haplo; continues to be weak, tired, diarrhea  overnight.   Febrile.  Wife and daughter at bedside.     Objective:        Vitals:    12/29/24 1102   BP: 117/68   Pulse: 103   Resp: 18   Temp: 98.9 °F (37.2 °C)            Physical Exam  Vitals and nursing note reviewed.   Constitutional:       Appearance: He is well-developed. He is ill-appearing.   HENT:      Head: Normocephalic and atraumatic.      Right Ear: External ear normal.      Left Ear: External ear normal.      Mouth/Throat:      Mouth: Mucous membranes are dry.      Pharynx: No oropharyngeal exudate.   Eyes:      Extraocular Movements: Extraocular movements intact.      Conjunctiva/sclera: Conjunctivae normal.   Cardiovascular:      Rate and Rhythm: Normal rate and regular rhythm.      Pulses: Normal pulses.      Heart sounds: Normal heart sounds. No murmur heard.  Pulmonary:      Effort: Pulmonary effort is normal. No respiratory distress.      Breath sounds: No stridor. Examination of the left-lower field reveals decreased breath sounds. Decreased breath sounds present. No wheezing or rales.   Abdominal:      General: Bowel sounds are normal.      Palpations: Abdomen is soft.      Tenderness: There is no abdominal tenderness.   Musculoskeletal:         General: Normal range of motion.      Cervical back: Normal range of motion and neck supple.      Right lower leg: No edema.      Left lower leg: No edema.   Skin:     General: Skin is warm and dry.      Findings: No bruising, erythema or rash.      Comments: Right triple lumen vaughn clean and dry with no signs of infection   Neurological:      General: No focal deficit present.      Mental Status: He is alert and oriented to person, place, and time.      Motor: Weakness  present.   Psychiatric:         Mood and Affect: Mood normal.         Behavior: Behavior normal.         Thought Content: Thought content normal.         Judgment: Judgment normal.            Significant Labs:      Lab Results   Component Value Date    WBC 0.90 (LL) 12/29/2024    HGB 8.3 (L) 12/29/2024    HCT 23.6 (L) 12/29/2024     (H) 12/29/2024    PLT 8 (LL) 12/29/2024       Gran # (ANC)   Date Value Ref Range Status   12/29/2024 0.3 (L) 1.8 - 7.7 K/uL Final     Gran %   Date Value Ref Range Status   12/29/2024 35.6 (L) 38.0 - 73.0 % Final     CMP  Sodium   Date Value Ref Range Status   12/29/2024 134 (L) 136 - 145 mmol/L Final     Potassium   Date Value Ref Range Status   12/29/2024 2.5 (LL) 3.5 - 5.1 mmol/L Final     Comment:     critical result(s) called and verbal readback obtained from   luis isms rn by WPT 12/29/2024 05:41       Chloride   Date Value Ref Range Status   12/29/2024 104 95 - 110 mmol/L Final     CO2   Date Value Ref Range Status   12/29/2024 22 (L) 23 - 29 mmol/L Final     Glucose   Date Value Ref Range Status   12/29/2024 122 (H) 70 - 110 mg/dL Final     BUN   Date Value Ref Range Status   12/29/2024 7 (L) 8 - 23 mg/dL Final     Creatinine   Date Value Ref Range Status   12/29/2024 1.0 0.5 - 1.4 mg/dL Final     Calcium   Date Value Ref Range Status   12/29/2024 7.6 (L) 8.7 - 10.5 mg/dL Final     Total Protein   Date Value Ref Range Status   12/29/2024 4.5 (L) 6.0 - 8.4 g/dL Final     Albumin   Date Value Ref Range Status   12/29/2024 2.3 (L) 3.5 - 5.2 g/dL Final     Total Bilirubin   Date Value Ref Range Status   12/29/2024 0.5 0.1 - 1.0 mg/dL Final     Comment:     For infants and newborns, interpretation of results should be based  on gestational age, weight and in agreement with clinical  observations.    Premature Infant recommended reference ranges:  Up to 24 hours.............<8.0 mg/dL  Up to 48 hours............<12.0 mg/dL  3-5 days..................<15.0 mg/dL  6-29  days.................<15.0 mg/dL       Alkaline Phosphatase   Date Value Ref Range Status   12/29/2024 41 40 - 150 U/L Final     AST   Date Value Ref Range Status   12/29/2024 29 10 - 40 U/L Final     ALT   Date Value Ref Range Status   12/29/2024 29 10 - 44 U/L Final     Anion Gap   Date Value Ref Range Status   12/29/2024 8 8 - 16 mmol/L Final     eGFR   Date Value Ref Range Status   12/29/2024 >60.0 >60 mL/min/1.73 m^2 Final     Cytomegalovirus PCR, Quant   Date Value Ref Range Status   12/26/2024 Not Detected <50 IU/mL Final   12/23/2024 Not Detected <50 IU/mL Final   12/19/2024 Not Detected <50 IU/mL Final       Tacrolimus Lvl   Date Value Ref Range Status   12/28/2024 8.1 5.0 - 15.0 ng/mL Final     Comment:     Testing performed by a chemiluminescent microparticle   immunoassay on the [employer] System.    CAUTION: No firm therapeutic range exists for tacrolimus in whole   blood. The   complexity of the clinical state, individual differences in   sensitivity to   immunosuppressive and nephrotoxic effects of tacrolimus,   co-administration   of other immunosuppressants, type of transplant, time post-transplant   and a   number of other factors contribute to different requirements for   optimal   blood levels of tacrolimus. Therefore, individual tacrolimus values   cannot   be used as the sole indicator for making changes in treatment regimen   and   each patient should be thoroughly evaluated clinically before changes   in   treatment regimens are made. Each user must establish his or her own   ranges   based on clinical experience.  Therapeutic ranges vary according to the commercial test used, and   therefore   should be established for each commercial test. Values obtained with   different assay methods cannot be used interchangeably due to   differences in   assay methods and cross-reactivity with metabolites, nor should   correction   factors be applied. Therefore, consistent use of one assay for    individual   patients is recommended.     12/27/2024 8.2 5.0 - 15.0 ng/mL Final     Comment:     Testing performed by a chemiluminescent microparticle   immunoassay on the [employer] System.    CAUTION: No firm therapeutic range exists for tacrolimus in whole   blood. The   complexity of the clinical state, individual differences in   sensitivity to   immunosuppressive and nephrotoxic effects of tacrolimus,   co-administration   of other immunosuppressants, type of transplant, time post-transplant   and a   number of other factors contribute to different requirements for   optimal   blood levels of tacrolimus. Therefore, individual tacrolimus values   cannot   be used as the sole indicator for making changes in treatment regimen   and   each patient should be thoroughly evaluated clinically before changes   in   treatment regimens are made. Each user must establish his or her own   ranges   based on clinical experience.  Therapeutic ranges vary according to the commercial test used, and   therefore   should be established for each commercial test. Values obtained with   different assay methods cannot be used interchangeably due to   differences in   assay methods and cross-reactivity with metabolites, nor should   correction   factors be applied. Therefore, consistent use of one assay for   individual   patients is recommended.     12/26/2024 13.1 5.0 - 15.0 ng/mL Final     Comment:     Testing performed by a chemiluminescent microparticle   immunoassay on the [employer] System.    CAUTION: No firm therapeutic range exists for tacrolimus in whole   blood. The   complexity of the clinical state, individual differences in   sensitivity to   immunosuppressive and nephrotoxic effects of tacrolimus,   co-administration   of other immunosuppressants, type of transplant, time post-transplant   and a   number of other factors contribute to different requirements for   optimal   blood levels of tacrolimus.  Therefore, individual tacrolimus values   cannot   be used as the sole indicator for making changes in treatment regimen   and   each patient should be thoroughly evaluated clinically before changes   in   treatment regimens are made. Each user must establish his or her own   ranges   based on clinical experience.  Therapeutic ranges vary according to the commercial test used, and   therefore   should be established for each commercial test. Values obtained with   different assay methods cannot be used interchangeably due to   differences in   assay methods and cross-reactivity with metabolites, nor should   correction   factors be applied. Therefore, consistent use of one assay for   individual   patients is recommended.         Diagnostic Results:  I have reviewed all pertinent imaging results/findings within the past 24 hours.  Assessment/Plan:     * Neutropenic fever  - remains febrile  -likely from PNA and HLH  -cholecystitis ruled out  -abx per ID    S/P allogeneic bone marrow transplant  Status post haploidentical stem cell transplantation conditioned with FluMel 100 + PTCy+ 2Gy TBI . Currently Day+ 101    Engrafted on 10/09/24 day +20.  Day 30 bone marrow (11/5/2024) showing mildly hypercellular marrow with no increased blast (0.3%) and no evidence of myeloid neoplasm. Pending chimerisms, NGS, and CG  Day 100 bone marrow biopsy to be moved up early to Monday 12/30/24 inpt; see below   Maintain central line    -maintain current tacro dose   -repeat daily levels  - continue cmv ppx with letermovir    Myelodysplastic syndrome  - Status post treatment with azacitidine 75 mg/m2 daily x7 days plus venetoclax 100mg (voriconazole) daily x14 of 28 days.Venetoclax decreased to 7 days with cycle 3 until completion of 11 cycles. S/p Haplo BMT as above. No plans for maintenance at this time.     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 He started budesonide 3mg TID on 10/28/24. Due to persistent nausea despite budesonide so started systemic steroids 11/1 at 0.5 mg/kg and his steroid taper has been given to him. Steroid taper completed 12/8/24.  - see tacro dosing under allogeneic BMT  -diarrhea persists but just started imodium prn  this am  -if diarrhea persists may need flex sig in coming days     Folate deficiency  - continue home folate    Copper deficiency  - continue home copper    RLS (restless legs syndrome)  - Continue home ropinirole     Hypotension  Likely dehydration   Continue IVF support         Insomnia  - Continue home Ambien    Hypertension, essential  - holding home antihypertensives given hypotension on presentation    Physical debility  - PT/OT consulted on admit; appreciate recs        VTE Risk Mitigation (From admission, onward)           Ordered     Reason for No Pharmacological VTE Prophylaxis  Once        Question:  Reasons:  Answer:  Thrombocytopenia    12/26/24 1330     IP VTE HIGH RISK PATIENT  Once         12/26/24 1330     Place sequential compression device  Until discontinued         12/26/24 1330                    Disposition: Remains inpatient  Inder Best MD  Hematology & Stem Cell Transplant

## 2024-12-29 NOTE — CARE UPDATE
"RAPID RESPONSE NURSE CHART REVIEW        Chart Reviewed: 12/29/2024, 6:09 AM    MRN: [id number]  Bed: 805/805 A    Dx: Neutropenic fever    Guillaume Salinas has a past medical history of Anticoagulant long-term use, Coronary artery disease, Hypertension, Myelodysplastic syndrome, and Peripheral vascular disease, unspecified.    Last VS: /70 (BP Location: Right arm, Patient Position: Lying)   Pulse (!) 112   Temp (!) 100.9 °F (38.3 °C) (Oral)   Resp 19   Ht 5' 9" (1.753 m)   Wt 63 kg (138 lb 14.2 oz)   SpO2 (!) 92%   BMI 20.51 kg/m²     24H Vital Sign Range:  Temp:  [98.7 °F (37.1 °C)-102.9 °F (39.4 °C)]   Pulse:  [106-112]   Resp:  [16-19]   BP: (104-127)/(64-76)   SpO2:  [92 %-97 %]     Level of Consciousness (AVPU): alert    Recent Labs     12/26/24  0843 12/27/24  0454 12/28/24  0507 12/29/24  0350   WBC 1.59* 1.16* 1.20* 0.90*   HGB 11.4* 9.2* 9.1* 8.3*   HCT 33.0* 26.3* 25.3* 23.6*   PLT 19* 14* 10*  --        Recent Labs     12/27/24  0454 12/28/24  0507 12/29/24  0350   * 133* 134*   K 4.2 3.1* 2.5*    101 104   CO2 23 22* 22*   BUN 15 8 7*   CREATININE 0.8 0.8 1.0   * 111* 122*   PHOS 2.1* 2.0* 1.8*   MG 1.4* 1.4* 1.5*             MEWS score: 2    Rounding completed with charge PEDRO PABLO Ruiz reports all vss and pt currently afebrile @ 99.2. Pt with Tmax 102.9 just prior to shift change. No additional concerns verbalized at this time. Instructed to call [phone] for further concerns or assistance.    Dutch Winkler RN       "

## 2024-12-29 NOTE — NURSING
Pt w 5th watery stool today. Specimen collected however pt started on IV Abx Dec 27th ; not enough time has elapsed to test for C-Diff. Precautions maintained.

## 2024-12-29 NOTE — PLAN OF CARE
TID plan of care    Remains febrile - but concerns for EBV related HLH pending workup and ritux for EBV  Continue current antimicrobials  Check MRSA nares PCR, if negative then stop vancomycin  Will follow

## 2024-12-29 NOTE — PROGRESS NOTES
12/29/24 1445   Vital Signs   Temp (!) 101.4 °F (38.6 °C)   Temp Source Oral     Temp notified to team BMT .

## 2024-12-30 PROBLEM — R19.7 DIARRHEA: Status: ACTIVE | Noted: 2024-12-30

## 2024-12-30 PROBLEM — E87.6 HYPOKALEMIA: Status: ACTIVE | Noted: 2024-12-30

## 2024-12-30 PROBLEM — E83.39 HYPOPHOSPHATEMIA: Status: ACTIVE | Noted: 2024-12-30

## 2024-12-30 PROBLEM — D76.1 HLH (HEMOPHAGOCYTIC LYMPHOHISTIOCYTOSIS): Status: ACTIVE | Noted: 2024-12-30

## 2024-12-30 LAB
ALBUMIN SERPL BCP-MCNC: 2.3 G/DL (ref 3.5–5.2)
ALP SERPL-CCNC: 42 U/L (ref 40–150)
ALT SERPL W/O P-5'-P-CCNC: 50 U/L (ref 10–44)
ANION GAP SERPL CALC-SCNC: 10 MMOL/L (ref 8–16)
ANION GAP SERPL CALC-SCNC: 8 MMOL/L (ref 8–16)
AST SERPL-CCNC: 57 U/L (ref 10–40)
BACTERIA STL CULT: NORMAL
BASOPHILS # BLD AUTO: 0 K/UL (ref 0–0.2)
BASOPHILS NFR BLD: 0 % (ref 0–1.9)
BILIRUB SERPL-MCNC: 0.5 MG/DL (ref 0.1–1)
BUN SERPL-MCNC: 12 MG/DL (ref 8–23)
BUN SERPL-MCNC: 8 MG/DL (ref 8–23)
CALCIUM SERPL-MCNC: 7.7 MG/DL (ref 8.7–10.5)
CALCIUM SERPL-MCNC: 7.7 MG/DL (ref 8.7–10.5)
CHLORIDE SERPL-SCNC: 109 MMOL/L (ref 95–110)
CHLORIDE SERPL-SCNC: 111 MMOL/L (ref 95–110)
CO2 SERPL-SCNC: 19 MMOL/L (ref 23–29)
CO2 SERPL-SCNC: 22 MMOL/L (ref 23–29)
CREAT SERPL-MCNC: 1.3 MG/DL (ref 0.5–1.4)
CREAT SERPL-MCNC: 1.3 MG/DL (ref 0.5–1.4)
DIFFERENTIAL METHOD BLD: ABNORMAL
EOSINOPHIL # BLD AUTO: 0 K/UL (ref 0–0.5)
EOSINOPHIL NFR BLD: 0 % (ref 0–8)
ERYTHROCYTE [DISTWIDTH] IN BLOOD BY AUTOMATED COUNT: 15.9 % (ref 11.5–14.5)
EST. GFR  (NO RACE VARIABLE): 59.5 ML/MIN/1.73 M^2
EST. GFR  (NO RACE VARIABLE): 59.5 ML/MIN/1.73 M^2
GLUCOSE SERPL-MCNC: 110 MG/DL (ref 70–110)
GLUCOSE SERPL-MCNC: 267 MG/DL (ref 70–110)
HCT VFR BLD AUTO: 23.4 % (ref 40–54)
HGB BLD-MCNC: 8.2 G/DL (ref 14–18)
IMM GRANULOCYTES # BLD AUTO: 0.01 K/UL (ref 0–0.04)
IMM GRANULOCYTES NFR BLD AUTO: 1.1 % (ref 0–0.5)
LYMPHOCYTES # BLD AUTO: 0.4 K/UL (ref 1–4.8)
LYMPHOCYTES NFR BLD: 46.7 % (ref 18–48)
MAGNESIUM SERPL-MCNC: 1.5 MG/DL (ref 1.6–2.6)
MAGNESIUM SERPL-MCNC: 1.6 MG/DL (ref 1.6–2.6)
MCH RBC QN AUTO: 38.3 PG (ref 27–31)
MCHC RBC AUTO-ENTMCNC: 35 G/DL (ref 32–36)
MCV RBC AUTO: 109 FL (ref 82–98)
MONOCYTES # BLD AUTO: 0.1 K/UL (ref 0.3–1)
MONOCYTES NFR BLD: 8.7 % (ref 4–15)
NEUTROPHILS # BLD AUTO: 0.4 K/UL (ref 1.8–7.7)
NEUTROPHILS NFR BLD: 43.5 % (ref 38–73)
NRBC BLD-RTO: 0 /100 WBC
OHS QRS DURATION: 76 MS
OHS QTC CALCULATION: 447 MS
PHOSPHATE SERPL-MCNC: 2.1 MG/DL (ref 2.7–4.5)
PHOSPHATE SERPL-MCNC: 2.3 MG/DL (ref 2.7–4.5)
PLATELET # BLD AUTO: 18 K/UL (ref 150–450)
PLATELET BLD QL SMEAR: ABNORMAL
PMV BLD AUTO: 10.3 FL (ref 9.2–12.9)
POTASSIUM SERPL-SCNC: 2.5 MMOL/L (ref 3.5–5.1)
POTASSIUM SERPL-SCNC: 3.3 MMOL/L (ref 3.5–5.1)
PROT SERPL-MCNC: 4.7 G/DL (ref 6–8.4)
RBC # BLD AUTO: 2.14 M/UL (ref 4.6–6.2)
SODIUM SERPL-SCNC: 138 MMOL/L (ref 136–145)
SODIUM SERPL-SCNC: 141 MMOL/L (ref 136–145)
TACROLIMUS BLD-MCNC: 10.1 NG/ML (ref 5–15)
VANCOMYCIN TROUGH SERPL-MCNC: 19.4 UG/ML (ref 10–22)
WBC # BLD AUTO: 0.92 K/UL (ref 3.9–12.7)

## 2024-12-30 PROCEDURE — 80197 ASSAY OF TACROLIMUS: CPT | Performed by: NURSE PRACTITIONER

## 2024-12-30 PROCEDURE — 99222 1ST HOSP IP/OBS MODERATE 55: CPT | Mod: GC,,, | Performed by: STUDENT IN AN ORGANIZED HEALTH CARE EDUCATION/TRAINING PROGRAM

## 2024-12-30 PROCEDURE — 88264 CHROMOSOME ANALYSIS 20-25: CPT | Performed by: NURSE PRACTITIONER

## 2024-12-30 PROCEDURE — 88185 FLOWCYTOMETRY/TC ADD-ON: CPT | Mod: 59 | Performed by: PATHOLOGY

## 2024-12-30 PROCEDURE — 80048 BASIC METABOLIC PNL TOTAL CA: CPT | Mod: XB | Performed by: NURSE PRACTITIONER

## 2024-12-30 PROCEDURE — 25000003 PHARM REV CODE 250: Performed by: NURSE PRACTITIONER

## 2024-12-30 PROCEDURE — 88341 IMHCHEM/IMCYTCHM EA ADD ANTB: CPT | Performed by: PATHOLOGY

## 2024-12-30 PROCEDURE — 079T3ZX DRAINAGE OF BONE MARROW, PERCUTANEOUS APPROACH, DIAGNOSTIC: ICD-10-PCS | Performed by: INTERNAL MEDICINE

## 2024-12-30 PROCEDURE — 81268 CHIMERISM ANAL W/CELL SELECT: CPT | Mod: 91 | Performed by: NURSE PRACTITIONER

## 2024-12-30 PROCEDURE — 88311 DECALCIFY TISSUE: CPT | Performed by: PATHOLOGY

## 2024-12-30 PROCEDURE — 88341 IMHCHEM/IMCYTCHM EA ADD ANTB: CPT | Mod: 26,,, | Performed by: PATHOLOGY

## 2024-12-30 PROCEDURE — 27000221 HC OXYGEN, UP TO 24 HOURS

## 2024-12-30 PROCEDURE — 88184 FLOWCYTOMETRY/ TC 1 MARKER: CPT | Performed by: PATHOLOGY

## 2024-12-30 PROCEDURE — 88189 FLOWCYTOMETRY/READ 16 & >: CPT | Mod: ,,, | Performed by: PATHOLOGY

## 2024-12-30 PROCEDURE — 88311 DECALCIFY TISSUE: CPT | Mod: 26,,, | Performed by: PATHOLOGY

## 2024-12-30 PROCEDURE — 97112 NEUROMUSCULAR REEDUCATION: CPT

## 2024-12-30 PROCEDURE — 85097 BONE MARROW INTERPRETATION: CPT | Mod: ,,, | Performed by: PATHOLOGY

## 2024-12-30 PROCEDURE — 88237 TISSUE CULTURE BONE MARROW: CPT | Performed by: NURSE PRACTITIONER

## 2024-12-30 PROCEDURE — 88299 UNLISTED CYTOGENETIC STUDY: CPT | Performed by: NURSE PRACTITIONER

## 2024-12-30 PROCEDURE — 88342 IMHCHEM/IMCYTCHM 1ST ANTB: CPT | Mod: 26,59,, | Performed by: PATHOLOGY

## 2024-12-30 PROCEDURE — 63600175 PHARM REV CODE 636 W HCPCS: Performed by: NURSE PRACTITIONER

## 2024-12-30 PROCEDURE — 25000003 PHARM REV CODE 250: Performed by: INTERNAL MEDICINE

## 2024-12-30 PROCEDURE — 88342 IMHCHEM/IMCYTCHM 1ST ANTB: CPT | Performed by: PATHOLOGY

## 2024-12-30 PROCEDURE — 94761 N-INVAS EAR/PLS OXIMETRY MLT: CPT

## 2024-12-30 PROCEDURE — 81268 CHIMERISM ANAL W/CELL SELECT: CPT | Performed by: NURSE PRACTITIONER

## 2024-12-30 PROCEDURE — 84100 ASSAY OF PHOSPHORUS: CPT | Performed by: INTERNAL MEDICINE

## 2024-12-30 PROCEDURE — 99233 SBSQ HOSP IP/OBS HIGH 50: CPT | Mod: FS,25,, | Performed by: INTERNAL MEDICINE

## 2024-12-30 PROCEDURE — 88313 SPECIAL STAINS GROUP 2: CPT | Performed by: PATHOLOGY

## 2024-12-30 PROCEDURE — 87040 BLOOD CULTURE FOR BACTERIA: CPT | Performed by: NURSE PRACTITIONER

## 2024-12-30 PROCEDURE — 83735 ASSAY OF MAGNESIUM: CPT | Performed by: INTERNAL MEDICINE

## 2024-12-30 PROCEDURE — 85025 COMPLETE CBC W/AUTO DIFF WBC: CPT | Performed by: INTERNAL MEDICINE

## 2024-12-30 PROCEDURE — 20600001 HC STEP DOWN PRIVATE ROOM

## 2024-12-30 PROCEDURE — 99900035 HC TECH TIME PER 15 MIN (STAT)

## 2024-12-30 PROCEDURE — 80202 ASSAY OF VANCOMYCIN: CPT | Performed by: INTERNAL MEDICINE

## 2024-12-30 PROCEDURE — 88313 SPECIAL STAINS GROUP 2: CPT | Mod: 26,,, | Performed by: PATHOLOGY

## 2024-12-30 PROCEDURE — 88305 TISSUE EXAM BY PATHOLOGIST: CPT | Mod: 26,,, | Performed by: PATHOLOGY

## 2024-12-30 PROCEDURE — 63600175 PHARM REV CODE 636 W HCPCS: Performed by: INTERNAL MEDICINE

## 2024-12-30 PROCEDURE — 88271 CYTOGENETICS DNA PROBE: CPT | Performed by: NURSE PRACTITIONER

## 2024-12-30 PROCEDURE — 38222 DX BONE MARROW BX & ASPIR: CPT | Mod: RT,,, | Performed by: NURSE PRACTITIONER

## 2024-12-30 PROCEDURE — 81450 HL NEO GSAP 5-50DNA/DNA&RNA: CPT | Performed by: NURSE PRACTITIONER

## 2024-12-30 PROCEDURE — 88305 TISSUE EXAM BY PATHOLOGIST: CPT | Performed by: PATHOLOGY

## 2024-12-30 PROCEDURE — 84100 ASSAY OF PHOSPHORUS: CPT | Mod: 91 | Performed by: NURSE PRACTITIONER

## 2024-12-30 PROCEDURE — 80053 COMPREHEN METABOLIC PANEL: CPT | Performed by: INTERNAL MEDICINE

## 2024-12-30 PROCEDURE — 97535 SELF CARE MNGMENT TRAINING: CPT

## 2024-12-30 PROCEDURE — 38222 DX BONE MARROW BX & ASPIR: CPT

## 2024-12-30 PROCEDURE — 88365 INSITU HYBRIDIZATION (FISH): CPT | Mod: 26,,, | Performed by: PATHOLOGY

## 2024-12-30 PROCEDURE — 25000003 PHARM REV CODE 250: Performed by: STUDENT IN AN ORGANIZED HEALTH CARE EDUCATION/TRAINING PROGRAM

## 2024-12-30 PROCEDURE — 88365 INSITU HYBRIDIZATION (FISH): CPT | Performed by: PATHOLOGY

## 2024-12-30 PROCEDURE — 83735 ASSAY OF MAGNESIUM: CPT | Mod: 91 | Performed by: NURSE PRACTITIONER

## 2024-12-30 RX ORDER — LOPERAMIDE HYDROCHLORIDE 2 MG/1
2 CAPSULE ORAL 4 TIMES DAILY
Status: DISCONTINUED | OUTPATIENT
Start: 2024-12-30 | End: 2025-01-03

## 2024-12-30 RX ORDER — LORAZEPAM 2 MG/ML
0.5 INJECTION INTRAMUSCULAR
Status: DISCONTINUED | OUTPATIENT
Start: 2024-12-30 | End: 2025-01-06 | Stop reason: HOSPADM

## 2024-12-30 RX ORDER — METHYLPREDNISOLONE SOD SUCC 125 MG
100 VIAL (EA) INJECTION
Status: COMPLETED | OUTPATIENT
Start: 2024-12-30 | End: 2025-01-02

## 2024-12-30 RX ORDER — LIDOCAINE HYDROCHLORIDE 20 MG/ML
10 INJECTION, SOLUTION INFILTRATION; PERINEURAL ONCE
Status: COMPLETED | OUTPATIENT
Start: 2024-12-30 | End: 2024-12-30

## 2024-12-30 RX ORDER — DIPHENOXYLATE HYDROCHLORIDE AND ATROPINE SULFATE 2.5; .025 MG/1; MG/1
1 TABLET ORAL 4 TIMES DAILY PRN
Status: DISCONTINUED | OUTPATIENT
Start: 2024-12-30 | End: 2025-01-06 | Stop reason: HOSPADM

## 2024-12-30 RX ORDER — TACROLIMUS 0.5 MG/1
0.5 CAPSULE ORAL EVERY OTHER DAY
Status: DISCONTINUED | OUTPATIENT
Start: 2024-12-31 | End: 2025-01-01

## 2024-12-30 RX ORDER — POTASSIUM CHLORIDE 7.45 MG/ML
10 INJECTION INTRAVENOUS
Status: COMPLETED | OUTPATIENT
Start: 2024-12-30 | End: 2024-12-30

## 2024-12-30 RX ADMIN — POTASSIUM CHLORIDE 10 MEQ: 7.45 INJECTION INTRAVENOUS at 04:12

## 2024-12-30 RX ADMIN — FOLIC ACID 1 MG: 1 TABLET ORAL at 08:12

## 2024-12-30 RX ADMIN — Medication 2 MG: at 08:12

## 2024-12-30 RX ADMIN — POTASSIUM CHLORIDE 10 MEQ: 7.45 INJECTION INTRAVENOUS at 01:12

## 2024-12-30 RX ADMIN — ACYCLOVIR 800 MG: 800 TABLET ORAL at 09:12

## 2024-12-30 RX ADMIN — POTASSIUM CHLORIDE 10 MEQ: 7.45 INJECTION INTRAVENOUS at 08:12

## 2024-12-30 RX ADMIN — POTASSIUM CHLORIDE 10 MEQ: 7.45 INJECTION INTRAVENOUS at 11:12

## 2024-12-30 RX ADMIN — PANTOPRAZOLE SODIUM 40 MG: 40 TABLET, DELAYED RELEASE ORAL at 08:12

## 2024-12-30 RX ADMIN — ATOVAQUONE 1500 MG: 750 SUSPENSION ORAL at 08:12

## 2024-12-30 RX ADMIN — ACETAMINOPHEN 650 MG: 325 TABLET ORAL at 08:12

## 2024-12-30 RX ADMIN — DIPHENOXYLATE HYDROCHLORIDE AND ATROPINE SULFATE 1 TABLET: 2.5; .025 TABLET ORAL at 05:12

## 2024-12-30 RX ADMIN — ACYCLOVIR 800 MG: 800 TABLET ORAL at 08:12

## 2024-12-30 RX ADMIN — MUPIROCIN: 20 OINTMENT TOPICAL at 09:12

## 2024-12-30 RX ADMIN — Medication 1 TABLET: at 09:12

## 2024-12-30 RX ADMIN — GABAPENTIN 600 MG: 300 CAPSULE ORAL at 09:12

## 2024-12-30 RX ADMIN — PIPERACILLIN SODIUM AND TAZOBACTAM SODIUM 4.5 G: 4; .5 INJECTION, POWDER, LYOPHILIZED, FOR SOLUTION INTRAVENOUS at 01:12

## 2024-12-30 RX ADMIN — VANCOMYCIN HYDROCHLORIDE 125 MG: KIT at 08:12

## 2024-12-30 RX ADMIN — POTASSIUM CHLORIDE 10 MEQ: 7.45 INJECTION INTRAVENOUS at 02:12

## 2024-12-30 RX ADMIN — POTASSIUM CHLORIDE 10 MEQ: 7.45 INJECTION INTRAVENOUS at 05:12

## 2024-12-30 RX ADMIN — ZOLPIDEM TARTRATE 5 MG: 5 TABLET, FILM COATED ORAL at 09:12

## 2024-12-30 RX ADMIN — LOPERAMIDE HYDROCHLORIDE 2 MG: 2 CAPSULE ORAL at 05:12

## 2024-12-30 RX ADMIN — LOPERAMIDE HYDROCHLORIDE 2 MG: 2 CAPSULE ORAL at 01:12

## 2024-12-30 RX ADMIN — MUPIROCIN: 20 OINTMENT TOPICAL at 08:12

## 2024-12-30 RX ADMIN — PIPERACILLIN SODIUM AND TAZOBACTAM SODIUM 4.5 G: 4; .5 INJECTION, POWDER, LYOPHILIZED, FOR SOLUTION INTRAVENOUS at 04:12

## 2024-12-30 RX ADMIN — Medication 1 TABLET: at 05:12

## 2024-12-30 RX ADMIN — LOPERAMIDE HYDROCHLORIDE 2 MG: 2 CAPSULE ORAL at 09:12

## 2024-12-30 RX ADMIN — METHYLPREDNISOLONE SODIUM SUCCINATE 100 MG: 125 INJECTION, POWDER, LYOPHILIZED, FOR SOLUTION INTRAMUSCULAR; INTRAVENOUS at 09:12

## 2024-12-30 RX ADMIN — LIDOCAINE HYDROCHLORIDE 10 ML: 20 INJECTION, SOLUTION INFILTRATION; PERINEURAL at 02:12

## 2024-12-30 RX ADMIN — Medication 1 TABLET: at 08:12

## 2024-12-30 RX ADMIN — SODIUM CHLORIDE: 9 INJECTION, SOLUTION INTRAVENOUS at 02:12

## 2024-12-30 RX ADMIN — DIPHENOXYLATE HYDROCHLORIDE AND ATROPINE SULFATE 1 TABLET: 2.5; .025 TABLET ORAL at 10:12

## 2024-12-30 RX ADMIN — PIPERACILLIN SODIUM AND TAZOBACTAM SODIUM 4.5 G: 4; .5 INJECTION, POWDER, LYOPHILIZED, FOR SOLUTION INTRAVENOUS at 09:12

## 2024-12-30 RX ADMIN — ROPINIROLE HYDROCHLORIDE 0.5 MG: 0.25 TABLET, FILM COATED ORAL at 09:12

## 2024-12-30 RX ADMIN — POSACONAZOLE 300 MG: 100 TABLET, DELAYED RELEASE ORAL at 08:12

## 2024-12-30 RX ADMIN — POTASSIUM CHLORIDE 10 MEQ: 7.45 INJECTION INTRAVENOUS at 03:12

## 2024-12-30 RX ADMIN — VANCOMYCIN HYDROCHLORIDE 1250 MG: 1.25 INJECTION, POWDER, LYOPHILIZED, FOR SOLUTION INTRAVENOUS at 02:12

## 2024-12-30 RX ADMIN — Medication 1 TABLET: at 01:12

## 2024-12-30 RX ADMIN — POTASSIUM CHLORIDE 10 MEQ: 7.45 INJECTION INTRAVENOUS at 09:12

## 2024-12-30 RX ADMIN — LOPERAMIDE HYDROCHLORIDE 2 MG: 2 CAPSULE ORAL at 08:12

## 2024-12-30 RX ADMIN — LORAZEPAM 0.5 MG: 2 INJECTION INTRAMUSCULAR; INTRAVENOUS at 02:12

## 2024-12-30 NOTE — SUBJECTIVE & OBJECTIVE
Subjective:     Interval History: Day +102 s/p Flu/Rosalee/TBI + PtCy Haplo son BMT for MDS. Tacro 10.1 today, tacro changed to 0.5mg every other day. Day 100 bone marrow biopsy completed today, also done for suspicion of HLH. Received Rituxan yesterday evening. Continues to have high fevers, T.max 103 overnight. Day 4 of Zosyn. IV Vanc and po Vanc stopped today per ID. Continues to have multiple episodes of diarrhea, 9 documented in the last 24 hours. GI consulted for EGD and Flex Sig. Methylpred 2mg/kg/day started. Imodium changed to ATC and PRN Lomotil added. Patient denies abd pain, nausea, states stool is red but has been drinking a lot of red powerade.     Objective:     Vital Signs (Most Recent):  Temp: 99.3 °F (37.4 °C) (12/30/24 1126)  Pulse: 79 (12/30/24 1431)  Resp: 18 (12/30/24 1126)  BP: 103/66 (12/30/24 1126)  SpO2: 97 % (12/30/24 1126) Vital Signs (24h Range):  Temp:  [97.9 °F (36.6 °C)-103 °F (39.4 °C)] 99.3 °F (37.4 °C)  Pulse:  [] 79  Resp:  [16-24] 18  SpO2:  [95 %-100 %] 97 %  BP: ()/(57-79) 103/66     Weight: 63 kg (138 lb 14.2 oz)  Body mass index is 20.51 kg/m².  Body surface area is 1.75 meters squared.      Intake/Output - Last 3 Shifts         12/28 0700  12/29 0659 12/29 0700  12/30 0659 12/30 0700  12/31 0659    I.V. (mL/kg) 1790 (28.4)      Blood  294     IV Piggyback 350 998.5     Total Intake(mL/kg) 2140 (34) 1292.5 (20.5)     Urine (mL/kg/hr)       Stool       Total Output       Net +2140 +1292.5            Urine Occurrence 8 x 9 x     Stool Occurrence 6 x 9 x              Physical Exam  Vitals and nursing note reviewed.   Constitutional:       Appearance: He is well-developed. He is ill-appearing.   HENT:      Head: Normocephalic and atraumatic.      Right Ear: External ear normal.      Left Ear: External ear normal.      Mouth/Throat:      Mouth: Mucous membranes are dry.      Pharynx: No oropharyngeal exudate.   Eyes:      Extraocular Movements: Extraocular movements  intact.      Conjunctiva/sclera: Conjunctivae normal.   Cardiovascular:      Rate and Rhythm: Normal rate and regular rhythm.      Pulses: Normal pulses.      Heart sounds: Normal heart sounds. No murmur heard.  Pulmonary:      Effort: Pulmonary effort is normal. No respiratory distress.      Breath sounds: Normal breath sounds. No stridor. No wheezing or rales.   Abdominal:      General: Bowel sounds are normal.      Palpations: Abdomen is soft.      Tenderness: There is no abdominal tenderness.   Musculoskeletal:         General: Normal range of motion.      Cervical back: Normal range of motion and neck supple.      Right lower leg: No edema.      Left lower leg: No edema.   Skin:     General: Skin is warm and dry.      Findings: No bruising, erythema or rash.      Comments: Right triple lumen vaughn clean and dry with no signs of infection   Neurological:      General: No focal deficit present.      Mental Status: He is alert and oriented to person, place, and time.      Motor: Weakness present.   Psychiatric:         Mood and Affect: Mood normal.         Behavior: Behavior normal.         Thought Content: Thought content normal.         Judgment: Judgment normal.            Significant Labs:   CBC:   Recent Labs   Lab 12/29/24  0350 12/30/24  0119   WBC 0.90* 0.92*   HGB 8.3* 8.2*   HCT 23.6* 23.4*   PLT 8* 18*    and CMP:   Recent Labs   Lab 12/29/24  0350 12/30/24  0119   * 141   K 2.5* 2.5*    111*   CO2 22* 22*   * 110   BUN 7* 8   CREATININE 1.0 1.3   CALCIUM 7.6* 7.7*   PROT 4.5* 4.7*   ALBUMIN 2.3* 2.3*   BILITOT 0.5 0.5   ALKPHOS 41 42   AST 29 57*   ALT 29 50*   ANIONGAP 8 8       Diagnostic Results:  None

## 2024-12-30 NOTE — NURSING
Completed Rituxan infusion Critical Potassium 2.5. Incontinent of Bowel and bladder this morning- output looks blood tinged. His blood counts look ok. Notified overnight fellow Dr MULU [place].

## 2024-12-30 NOTE — ASSESSMENT & PLAN NOTE
- remains febrile with T.max 103 in the past 24 hours  - blood cultures x2 NGTD  - UA negative  - RIP/covid negative  - CXR with chronic LLL infiltrate; unable to determine severity  - CT c/a/p with new LLL consolidation, presumed infectious; trace pericholecystic fluid- edema vs. Cholecystitis  - given zosyn and vanc x1 in ED;   - Day 4 zosyn. Stopped IV and po Vanc today  - fever possible from HLH, steroids started today

## 2024-12-30 NOTE — PROCEDURES
PROCEDURE NOTE:  Date of Procedure: 12/30/2024  Bone Marrow Biopsy and Aspiration  Indication: Day +102 s/p Haplo son BMT for MDS; rule out HLH  Consent: Informed consent was obtained from patient.  Timeout: Done and documented. Allergies reviewed.  Position: left lateral  Site: right posterior illiac crest.  Prep: Betadine.  Needle used: 11 gauge Jamshidi needle.  Anesthetic: 2% lidocaine 10 cc.  Biopsy: The biopsy needle was introduced into the marrow cavity and an aspirate was obtained without complications and sent for flow cytometry, AML fish, NGS, DNA/RNA hold, chimerisms, and cytogenetics. Core biopsy obtained without difficulty and sent for routine histologic examination.  Complications: None.  Disposition: The patient was placed supine for 15min following procedure. RN to assess bandaid for bleeding. Patient aware to keep bandaid dry and intact for 24hrs. Nursing to monitor for any bleeding, fevers, pain, or signs of infection.  Blood loss: Minimal.     Judy Anthony NP  Hematology/Oncology/BMT

## 2024-12-30 NOTE — PLAN OF CARE
Plan of care assumed from [id number] .      Day + 102  from JEAN haplo for MDS . Plan of care reviewed with patient .     - Dx neutropenic fever   - A&O x 4   - Tmax 101.6  this shift team BMT aware , tylenol administered .   - Electrolytes replaced .   - Multiple loose bowel movement , patient came c.diff negative , PRN Imodium/ lomotil  administered .   -  Regular diet with very poor appetite .  - Right chest wall triple lumen , dressing C/D/I   - NS infusing 100 ml/hr         Frequent patient round done . Bed in low and locked position . Call light and personal times within a reach . Bed alarm on . Family at the beds side .  Plan of care ongoing .

## 2024-12-30 NOTE — NURSING
2nd hour Rituxin rate increased 15 minutes ago to 100 cc/h pt developed rigors. Infusion rate decreased to 50cc/h then Held  applied 2lNC O2 25 mg Demerol given IVP. Pt 's, /76, O2 Sat w 2lNC 96%, RR 22. Notified Dr Ahuja. Per verbal order rechallenge w premeds IVPB benadryl and 650 PO Tylenol then restart Rituxin.Will CTM  EKG completed. ST rate 129.

## 2024-12-30 NOTE — ASSESSMENT & PLAN NOTE
- pancytopenia in the setting of uptrending EBV  - ferritin elevated  - IL 2 in process  - received Rituxan on 12/29  - bone marrow biopsy completed today  - IV Methylpred 2mg/kg/day started

## 2024-12-30 NOTE — PT/OT/SLP PROGRESS
"Occupational Therapy   Treatment    Name: Guillaume Salinas  MRN: [id number]  Admitting Diagnosis:  Diarrhea       Recommendations:     Discharge Recommendations: Low Intensity Therapy  Discharge Equipment Recommendations:  walker, rolling  Barriers to discharge:  None    Assessment:     Guillaume Salinas is a 69 y.o. male with a medical diagnosis of Diarrhea.  He presents with the following performance deficits affecting function: weakness, impaired endurance, gait instability, impaired self care skills, impaired balance. Pt was willing to participate, tolerated session fairly well overall. He was able to ambulate to bathroom to perform toileting and hygiene tasks. He demonstrated fair activity tolerance this date 2/2 to fever.     Rehab Prognosis:  Good; patient would benefit from acute skilled OT services to address these deficits and reach maximum level of function.       Plan:     Patient to be seen 3 x/week to address the above listed problems via self-care/home management, therapeutic activities, therapeutic exercises  Plan of Care Expires: 01/27/25  Plan of Care Reviewed with: patient, daughter    Subjective     Chief Complaint: fever, generalized weakness  Patient/Family Comments/goals: "I need to use the bathroom."  Pain/Comfort:  Pain Rating 1: 0/10  Pain Rating Post-Intervention 1: 0/10    Objective:     Communicated with: RN prior to session.  Patient found supine with peripheral IV upon OT entry to room.    General Precautions: Standard, fall    Orthopedic Precautions:N/A  Braces: N/A  Respiratory Status: Nasal cannula, 2 L     Occupational Performance:     Bed Mobility:    Patient completed Rolling/Turning to Left with  stand by assistance  Patient completed Scooting/Bridging with stand by assistance  Patient completed Supine to Sit with stand by assistance     Functional Mobility/Transfers:  Patient completed Sit <> Stand Transfer with stand by assistance  with  rolling walker "   Patient completed Toilet Transfer Step Transfer technique with stand by assistance with  rolling walker  Chair t/f: SBA c/ RW  Functional Mobility: from bed to bathroom, briefly into hallway, back to chair; SBA c/ RW; 1 LOB, Min A to correct    Activities of Daily Living:  Grooming: close SPV; pt washed hands standing at sink  Upper Body Dressing: minimum assistance pt donned gown as robe seated eob; assistance c/ bringing around back  Toileting: supervision performed on standard toilet; SPV for safety; pt able to manage gown, underwear appropriately; increased time to complete    Balance:  Static sitting balance: SPV eob ~ 3 min  Static standing balance: close SPV at sink ~ 2 min  Dynamic standing balance: SBA      AMPAC 6 Click ADL: 23    Treatment & Education:  Pt edu on role of OT, POC, safety when performing self care tasks , benefit of performing OOB activity, and safety when performing functional transfers and mobility.  - White board updated  - Self care tasks completed-- as noted above      Patient left up in chair with all lines intact, call button in reach, RN notified, and daughter present    GOALS:   Multidisciplinary Problems       Occupational Therapy Goals          Problem: Occupational Therapy    Goal Priority Disciplines Outcome Interventions   Occupational Therapy Goal     OT, PT/OT Progressing    Description: Goals to be met by: 1/10/2025     Patient will increase functional independence with ADLs by performing:    UE Dressing with Supervision.  LE Dressing with Supervision.  Grooming while standing at sink with Supervision.  Toileting from toilet with Supervision for hygiene and clothing management. -MET 12/30/2024  Toilet transfer to toilet with Supervision.                         Time Tracking:     OT Date of Treatment: 12/30/24  OT Start Time: 1307  OT Stop Time: 1334  OT Total Time (min): 27 min    Billable Minutes:Self Care/Home Management 16  Neuromuscular Re-education 11    OT/DIMITRIS: OT           12/30/2024

## 2024-12-30 NOTE — ASSESSMENT & PLAN NOTE
- Etiology of hypotension is likely decreased oral intake, diarrhea and fevers  - continue IVF  Improved

## 2024-12-30 NOTE — PROGRESS NOTES
12/30/24 0851   Vital Signs   Temp (!) 101.6 °F (38.7 °C)   Temp Source Oral     Temp notified to team BMT , PRN tylenol administered

## 2024-12-30 NOTE — PLAN OF CARE
POC reviewed w patient and family at beginning of shift and PRN. AAOX4. Family remain at the bedside. Up w Standby assist.  VSS.  TMAX 103 resolved spontaneously. 3 watery bowel movements this shift. Telemetry monitoring in place. NS infusing @ 100cc/h. IV abx Zosyn and Vanc given as ordered. Vanc trough therapeutic. Rituxin infusion completed. See separate note. Bed locked in low position call light in reach.Up w assist. Will CTM. Lytes replacements needed.

## 2024-12-30 NOTE — NURSING
Rituxan infusing at 25 cc/h. 30 minutes in to rechallenge pt w oral Temp 103 w/o rigors . Applied cool packs to axilla. Spoke w Dr Reyna pt temp refractory to PO tylenol premed. Verbal order to continue infusion as long as pt remains asymptomatic ( absent rigors , hypersensitivity rx or anaphylaxis. ).

## 2024-12-30 NOTE — SUBJECTIVE & OBJECTIVE
Past Medical History:   Diagnosis Date    Anticoagulant long-term use     Coronary artery disease     Hypertension     Myelodysplastic syndrome     Peripheral vascular disease, unspecified        Past Surgical History:   Procedure Laterality Date    BONE MARROW BIOPSY Left 2023    Procedure: Biopsy-bone marrow;  Surgeon: Harry Diamond MD;  Location: [facility] OR;  Service: Oncology;  Laterality: Left;    COLONOSCOPY N/A 2022    Procedure: COLONOSCOPY Golytely Vaccinated will bring cards;  Surgeon: Dereje Simon MD;  Location: [facility] ENDO;  Service: Endoscopy;  Laterality: N/A;  Do not cancel this order    INSERTION OF LEMONS CATHETER Right 2024    Procedure: INSERTION, CATHETER, CENTRAL VENOUS, LEMONS TRIPLE LUMEN;  Surgeon: Kg Patten MD;  Location: [address];  Service: General;  Laterality: Right;       Review of patient's allergies indicates:  No Known Allergies  Family History       Problem Relation (Age of Onset)    Cancer Father, Brother    Hypertension Mother    No Known Problems Daughter, Daughter, Son          Tobacco Use    Smoking status: Former     Current packs/day: 0.00     Average packs/day: 0.3 packs/day for 50.0 years (12.5 ttl pk-yrs)     Types: Cigarettes     Start date: 3/1/1973     Quit date: 3/1/2023     Years since quittin.8     Passive exposure: Past    Smokeless tobacco: Never   Substance and Sexual Activity    Alcohol use: Not Currently    Drug use: Never    Sexual activity: Not Currently     Partners: Female     Review of Systems  Objective:     Vital Signs (Most Recent):  Temp: 99.3 °F (37.4 °C) (24 1126)  Pulse: 96 (24 1126)  Resp: 18 (24 1126)  BP: 103/66 (24 1126)  SpO2: 97 % (24 1126) Vital Signs (24h Range):  Temp:  [97.9 °F (36.6 °C)-103 °F (39.4 °C)] 99.3 °F (37.4 °C)  Pulse:  [] 96  Resp:  [16-24] 18  SpO2:  [94 %-100 %] 97 %  BP: ()/(57-79) 103/66     Weight: 63 kg (138 lb 14.2 oz) (24  1132)  Body mass index is 20.51 kg/m².      Intake/Output Summary (Last 24 hours) at 12/30/2024 1154  Last data filed at 12/30/2024 0404  Gross per 24 hour   Intake 1292.51 ml   Output --   Net 1292.51 ml       Lines/Drains/Airways       Central Venous Catheter Line  Duration             Tunneled Central Line - Triple Lumen 09/13/24 1625 Internal Jugular Right 107 days                     Physical Exam  Constitutional:       Appearance: He is ill-appearing.      Comments: Patient is sleepy but awakens on verbal stimulus.   HENT:      Head: Normocephalic and atraumatic.   Eyes:      Pupils: Pupils are equal, round, and reactive to light.   Cardiovascular:      Rate and Rhythm: Normal rate and regular rhythm.   Pulmonary:      Effort: Pulmonary effort is normal.      Breath sounds: Normal breath sounds.   Abdominal:      General: Bowel sounds are normal.      Palpations: Abdomen is soft.      Tenderness: There is no guarding or rebound.   Musculoskeletal:      Right lower leg: No edema.      Left lower leg: No edema.   Skin:     Capillary Refill: Capillary refill takes less than 2 seconds.   Neurological:      Mental Status: He is alert and oriented to person, place, and time. Mental status is at baseline.      Comments: Alert and oriented x 4 although sleepy/tired   Psychiatric:         Mood and Affect: Mood normal.         Behavior: Behavior normal.         Thought Content: Thought content normal.         Judgment: Judgment normal.          Significant Labs:  All pertinent lab results from the last 24 hours have been reviewed.    Significant Imaging:  Imaging results within the past 24 hours have been reviewed.

## 2024-12-30 NOTE — ASSESSMENT & PLAN NOTE
- Due to underlying disease and chemotherapy/transplant  - Transfuse for Hgb <7 g/dL and platelets <10k.   - Folate and copper deficient, on supplementation.   - Promacta sent in on 12/9/24. Working on aditya for financial assistance for promacta.   - Changed TMP/SMX to atovaquone.   - continue ppx antimicrobials as above  - elevated ferritin, IL2 in process for suspected HLH  - bone marrow biopsy completed today and IV steroids started. Received Rituxan on 12/29

## 2024-12-30 NOTE — NURSING
Rituximab started at 0644 pm  , this Nurse stayed at the bed side for first 15 minutes no signs of reaction seen VS stable . Report and emergency medication given to night Nurse .

## 2024-12-30 NOTE — NURSING
Rituxin rate 25/cc/h .Oral temp 100.6 removed icepacks. Pt resting comfortably. VSS. Increased rate to 50cc/h

## 2024-12-30 NOTE — ASSESSMENT & PLAN NOTE
- GVHD prophylaxis with Post-transplant cyclophosphamide, Tacrolimus, MMF (MMF d/c on D+35).   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 He started budesonide 3mg TID on 10/28/24. Due to persistent nausea despite budesonide so started systemic steroids 11/1 at 0.5 mg/kg and his steroid completed 12/8/24.  - see tacro dosing under allogeneic BMT  - now with worsening diarrhea, GI consulted for flex sig and EGD  - methylpred 2mg/kg/day started

## 2024-12-30 NOTE — PROGRESS NOTES
VANCOMYCIN DOSING BY PHARMACY DISCONTINUATION NOTE     The pharmacy consult for vancomycin dosing has been discontinued.      Vancomycin Dosing by Pharmacy Consult will sign-off. Please reconsult if necessary. Thank you for allowing us to participate in this patient's care.         Sanjuana Poe, Pharm.D., [facility]  Clinical Pharmacy Specialist, Bone Marrow Transplant/Cellular Therapy  [facility]  SpectraLink: [phone]

## 2024-12-30 NOTE — ASSESSMENT & PLAN NOTE
-- 9-year-old male past medical history of MDS who is status post haploidentical stem cell transplantation conditioned with FluMel 100 + PTCy+ 2Gy TBI who is currently day +102  presents to emergency department with chief complaint of weakness, fatigue, lightheadedness as well as diarrhea. On Tacrolimus at home. Denied fever at home and daughter says she measured his temperature at home. In the hospital he developed several fever spikes. His WBC is 0.92 with an ANC of 0.4. Patient is still having ongoing diarrhea that is not bloody and not melanotic. Denied any recent antibiotic usage in the days preceding his hospital admission.    Recommendation:  -- patient's condition is to be stabilized further in regards to ANC count, preferably >500 prior to any EGD intervention. We will continue to monitor his diarrhea and condition in the meantime.

## 2024-12-30 NOTE — PROGRESS NOTES
[facility] - Oncology ([facility])  Hematology  Bone Marrow Transplant  Progress Note    Patient Name: Guillaume Salinas  Admission Date: 12/26/2024  Hospital Length of Stay: 3 days  Code Status: Full Code    Subjective:     Interval History: Day +102 s/p Flu/Rosalee/TBI + PtCy Haplo son BMT for MDS. Tacro 10.1 today, tacro changed to 0.5mg every other day. Day 100 bone marrow biopsy completed today, also done for suspicion of HLH. Received Rituxan yesterday evening. Continues to have high fevers, T.max 103 overnight. Day 4 of Zosyn. IV Vanc and po Vanc stopped today per ID. Continues to have multiple episodes of diarrhea, 9 documented in the last 24 hours. GI consulted for EGD and Flex Sig. Methylpred 2mg/kg/day started. Imodium changed to ATC and PRN Lomotil added. Patient denies abd pain, nausea, states stool is red but has been drinking a lot of red powerade.     Objective:     Vital Signs (Most Recent):  Temp: 99.3 °F (37.4 °C) (12/30/24 1126)  Pulse: 79 (12/30/24 1431)  Resp: 18 (12/30/24 1126)  BP: 103/66 (12/30/24 1126)  SpO2: 97 % (12/30/24 1126) Vital Signs (24h Range):  Temp:  [97.9 °F (36.6 °C)-103 °F (39.4 °C)] 99.3 °F (37.4 °C)  Pulse:  [] 79  Resp:  [16-24] 18  SpO2:  [95 %-100 %] 97 %  BP: ()/(57-79) 103/66     Weight: 63 kg (138 lb 14.2 oz)  Body mass index is 20.51 kg/m².  Body surface area is 1.75 meters squared.      Intake/Output - Last 3 Shifts         12/28 0700 12/29 0659 12/29 0700 12/30 0659 12/30 0700 12/31 0659    I.V. (mL/kg) 1790 (28.4)      Blood  294     IV Piggyback 350 998.5     Total Intake(mL/kg) 2140 (34) 1292.5 (20.5)     Urine (mL/kg/hr)       Stool       Total Output       Net +2140 +1292.5            Urine Occurrence 8 x 9 x     Stool Occurrence 6 x 9 x              Physical Exam  Vitals and nursing note reviewed.   Constitutional:       Appearance: He is well-developed. He is ill-appearing.   HENT:      Head: Normocephalic and atraumatic.      Right Ear:  External ear normal.      Left Ear: External ear normal.      Mouth/Throat:      Mouth: Mucous membranes are dry.      Pharynx: No oropharyngeal exudate.   Eyes:      Extraocular Movements: Extraocular movements intact.      Conjunctiva/sclera: Conjunctivae normal.   Cardiovascular:      Rate and Rhythm: Normal rate and regular rhythm.      Pulses: Normal pulses.      Heart sounds: Normal heart sounds. No murmur heard.  Pulmonary:      Effort: Pulmonary effort is normal. No respiratory distress.      Breath sounds: Normal breath sounds. No stridor. No wheezing or rales.   Abdominal:      General: Bowel sounds are normal.      Palpations: Abdomen is soft.      Tenderness: There is no abdominal tenderness.   Musculoskeletal:         General: Normal range of motion.      Cervical back: Normal range of motion and neck supple.      Right lower leg: No edema.      Left lower leg: No edema.   Skin:     General: Skin is warm and dry.      Findings: No bruising, erythema or rash.      Comments: Right triple lumen avughn clean and dry with no signs of infection   Neurological:      General: No focal deficit present.      Mental Status: He is alert and oriented to person, place, and time.      Motor: Weakness present.   Psychiatric:         Mood and Affect: Mood normal.         Behavior: Behavior normal.         Thought Content: Thought content normal.         Judgment: Judgment normal.            Significant Labs:   CBC:   Recent Labs   Lab 12/29/24  0350 12/30/24  0119   WBC 0.90* 0.92*   HGB 8.3* 8.2*   HCT 23.6* 23.4*   PLT 8* 18*    and CMP:   Recent Labs   Lab 12/29/24  0350 12/30/24  0119   * 141   K 2.5* 2.5*    111*   CO2 22* 22*   * 110   BUN 7* 8   CREATININE 1.0 1.3   CALCIUM 7.6* 7.7*   PROT 4.5* 4.7*   ALBUMIN 2.3* 2.3*   BILITOT 0.5 0.5   ALKPHOS 41 42   AST 29 57*   ALT 29 50*   ANIONGAP 8 8       Diagnostic Results:  None  Assessment/Plan:     * Neutropenic fever  - remains febrile with  T.max 103 in the past 24 hours  - blood cultures x2 NGTD  - UA negative  - RIP/covid negative  - CXR with chronic LLL infiltrate; unable to determine severity  - CT c/a/p with new LLL consolidation, presumed infectious; trace pericholecystic fluid- edema vs. Cholecystitis  - given zosyn and vanc x1 in ED;   - Day 4 zosyn. Stopped IV and po Vanc today  - fever possible from HLH, steroids started today    Pancytopenia  - Due to underlying disease and chemotherapy/transplant  - Transfuse for Hgb <7 g/dL and platelets <10k.   - Folate and copper deficient, on supplementation.   - Promacta sent in on 12/9/24. Working on aditya for financial assistance for promacta.   - Changed TMP/SMX to atovaquone.   - continue ppx antimicrobials as above  - elevated ferritin, IL2 in process for suspected HLH  - bone marrow biopsy completed today and IV steroids started. Received Rituxan on 12/29    HLH (hemophagocytic lymphohistiocytosis)  - pancytopenia in the setting of uptrending EBV  - ferritin elevated  - IL 2 in process  - received Rituxan on 12/29  - bone marrow biopsy completed today  - IV Methylpred 2mg/kg/day started     S/P allogeneic bone marrow transplant  Status post haploidentical stem cell transplantation conditioned with FluMel 100 + PTCy+ 2Gy TBI . Currently Day+ 102. Engrafted on 10/09/24 day +20.  Day 30 bone marrow (11/5/2024) showing mildly hypercellular marrow with no increased blast (0.3%) and no evidence of myeloid neoplasm.   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 He started budesonide 3mg TID on 10/28/24. Due to persistent nausea despite budesonide so started systemic steroids 11/1 at 0.5 mg/kg and his steroid completed 12/8/24.  - see tacro dosing under allogeneic BMT  - now with worsening diarrhea, GI consulted for flex sig and EGD  - methylpred 2mg/kg/day started    Myelodysplastic syndrome  - Status post treatment with azacitidine 75 mg/m2 daily x7 days plus venetoclax 100mg (voriconazole) daily x14 of 28 days.Venetoclax decreased to 7 days with cycle 3 until completion of 11 cycles. S/p Haplo BMT as above. No plans for maintenance at this time.     Diarrhea  - C.diff negative  - possible GI GVHD  - Imodium changed to ATC and PRN Lomotil added  - GI consulted    Copper deficiency  - continue home copper    Folate deficiency  - continue home folate    Hypokalemia  - likely due to poor absorption with possible GI GVHD  - replaced IV today  - BMT this evening    Hypophosphatemia  - daily phos level  - replace per PRN electrolyte protocol    Hypotension  - Etiology of hypotension is likely decreased oral intake, diarrhea and fevers  - continue IVF  Improved    Hypertension, essential  - holding home antihypertensives given hypotension on presentation    Physical debility  - PT/OT consulted on admit;     RLS (restless legs syndrome)  - Continue home ropinirole     Insomnia  - Continue home Ambien    ADDISON (acute kidney injury)  - Likely prerenal due to decreased PO intake. Received 2L IVF in ED; on continuous IVF  RESOLVED        VTE Risk Mitigation (From admission, onward)           Ordered     heparin, porcine (PF) 100 unit/mL injection flush 500  Units  As needed (PRN)         12/29/24 1443     Reason for No Pharmacological VTE Prophylaxis  Once        Question:  Reasons:  Answer:  Thrombocytopenia    12/26/24 1330     IP VTE HIGH RISK PATIENT  Once         12/26/24 1330     Place sequential compression device  Until discontinued         12/26/24 1330                    Disposition: inpatient     Meg Palacios NP  Bone Marrow Transplant  [facility] - Oncology ([facility])

## 2024-12-30 NOTE — CONSULTS
[facility]us[facility] - Oncology ([facility])  Gastroenterology  Consult Note    Patient Name: Guillaume Salinas  MRN: [id number]  Admission Date: 12/26/2024  Hospital Length of Stay: 3 days  Code Status: Full Code   Attending Provider: Darian Best MD   Consulting Provider: Mile Garcia MD  Primary Care Physician: Kal Hargrove MD  Principal Problem:Diarrhea    Inpatient consult to Gastroenterology  Consult performed by: Mile Garcia MD  Consult ordered by: Meg Palacios NP        Subjective:     HPI:  69-year-old male past medical history of MDS who is status post haploidentical stem cell transplantation conditioned with FluMel 100 + PTCy+ 2Gy TBI who is currently day +102  presents to emergency department with chief complaint of weakness, fatigue, lightheadedness.  The patient was seen earlier today at his oncology appointment.  He was with his daughter and heading towards his next pulmonary appointment when they decided to come to the emergency department because he was feeling weak and fatigue.   He complained of decreased appetite since James day, severe fatigue and prolonged sleepiness. Patient endorsed ongoing diarrhea that initially improved on imodium. He had nausea without vomiting.    Ultrasound abdomen:  Gallbladder sludge and nonspecific gallbladder wall thickening.  No findings to suggest acute cholecystitis.  2. Hepatomegaly with diffuse steatosis.  3. Splenomegaly.    CT chest, abdomen, pelvis: New left lower lobe consolidation and parapneumonic effusion concerning for acute infectious process. Trace pericholecystic fluid without significant gallbladder wall thickening or gallstones. Finding may be related to edema, however cholecystitis could present similarly.  Recommend correlation for cholecystitis.    Labs:  WBC: 0.92 ANC is: 0.4  Hgb 8.2 plt 18, ferritin 10,585 >> 18,914  Na/K 141/2.5  CRT 1.3 AST/ALT 57/50    Reason for Consult? 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 requesting Flex Sig and EGD with tap water enema prep     Past Medical History:   Diagnosis Date    Anticoagulant long-term use     Coronary artery disease     Hypertension     Myelodysplastic syndrome     Peripheral vascular disease, unspecified        Past Surgical History:   Procedure Laterality Date    BONE MARROW BIOPSY Left 2023    Procedure: Biopsy-bone marrow;  Surgeon: Harry Diamond MD;  Location: [facility] OR;  Service: Oncology;  Laterality: Left;    COLONOSCOPY N/A 2022    Procedure: COLONOSCOPY Golytely Vaccinated will bring cards;  Surgeon: Dereje Simon MD;  Location: [facility] ENDO;  Service: Endoscopy;  Laterality: N/A;  Do not cancel this order    INSERTION OF LEMONS CATHETER Right 2024    Procedure: INSERTION, CATHETER, CENTRAL VENOUS, LEMONS TRIPLE LUMEN;  Surgeon: Kg Patten MD;  Location: [address];  Service: General;  Laterality: Right;       Review of patient's allergies indicates:  No Known Allergies  Family History       Problem Relation (Age of Onset)    Cancer Father, Brother    Hypertension Mother    No Known Problems Daughter, Daughter, Son          Tobacco Use    Smoking status: Former     Current packs/day: 0.00     Average packs/day: 0.3 packs/day for 50.0 years (12.5 ttl pk-yrs)     Types: Cigarettes     Start date: 3/1/1973     Quit date: 3/1/2023     Years since quittin.8     Passive exposure: Past    Smokeless tobacco: Never   Substance and Sexual Activity    Alcohol use: Not Currently    Drug use: Never    Sexual activity: Not Currently     Partners: Female     Review of Systems  Objective:     Vital Signs (Most Recent):  Temp: 99.3 °F (37.4 °C) (24 1126)  Pulse: 96 (24 1126)  Resp: 18 (24 1126)  BP: 103/66 (24 1126)  SpO2: 97 % (24 1126) Vital Signs (24h Range):  Temp:  [97.9 °F (36.6 °C)-103 °F (39.4 °C)] 99.3 °F (37.4 °C)  Pulse:  [] 96  Resp:  [16-24] 18  SpO2:  [94 %-100 %] 97 %  BP:  ()/(57-79) 103/66     Weight: 63 kg (138 lb 14.2 oz) (12/26/24 1132)  Body mass index is 20.51 kg/m².      Intake/Output Summary (Last 24 hours) at 12/30/2024 1154  Last data filed at 12/30/2024 0404  Gross per 24 hour   Intake 1292.51 ml   Output --   Net 1292.51 ml       Lines/Drains/Airways       Central Venous Catheter Line  Duration             Tunneled Central Line - Triple Lumen 09/13/24 1625 Internal Jugular Right 107 days                     Physical Exam  Constitutional:       Appearance: He is ill-appearing.      Comments: Patient is sleepy but awakens on verbal stimulus.   HENT:      Head: Normocephalic and atraumatic.   Eyes:      Pupils: Pupils are equal, round, and reactive to light.   Cardiovascular:      Rate and Rhythm: Normal rate and regular rhythm.   Pulmonary:      Effort: Pulmonary effort is normal.      Breath sounds: Normal breath sounds.   Abdominal:      General: Bowel sounds are normal.      Palpations: Abdomen is soft.      Tenderness: There is no guarding or rebound.   Musculoskeletal:      Right lower leg: No edema.      Left lower leg: No edema.   Skin:     Capillary Refill: Capillary refill takes less than 2 seconds.   Neurological:      Mental Status: He is alert and oriented to person, place, and time. Mental status is at baseline.      Comments: Alert and oriented x 4 although sleepy/tired   Psychiatric:         Mood and Affect: Mood normal.         Behavior: Behavior normal.         Thought Content: Thought content normal.         Judgment: Judgment normal.          Significant Labs:  All pertinent lab results from the last 24 hours have been reviewed.    Significant Imaging:  Imaging results within the past 24 hours have been reviewed.  Assessment/Plan:     GI  * Diarrhea  -- 69-year-old male past medical history of MDS who is status post haploidentical stem cell transplantation conditioned with FluMel 100 + PTCy+ 2Gy TBI who is currently day +102  presents to emergency  department with chief complaint of weakness, fatigue, lightheadedness as well as diarrhea. On Tacrolimus at home. Denied fever at home and daughter says she measured his temperature at home. In the hospital he developed several fever spikes. His WBC is 0.92 with an ANC of 0.4. Patient is still having ongoing diarrhea that is not bloody and not melanotic. Denied any recent antibiotic usage in the days preceding his hospital admission.    Recommendation:  -- patient's condition is to be stabilized further in regards to ANC count, preferably >500 prior to any EGD intervention. We will continue to monitor his diarrhea and condition in the meantime.        Thank you for your consult. I will follow-up with patient. Please contact us if you have any additional questions.    Mile Garcia MD  Gastroenterology  [facility] - Oncology ([facility])

## 2024-12-30 NOTE — ASSESSMENT & PLAN NOTE
Status post haploidentical stem cell transplantation conditioned with FluMel 100 + PTCy+ 2Gy TBI . Currently Day+ 102. Engrafted on 10/09/24 day +20.  Day 30 bone marrow (11/5/2024) showing mildly hypercellular marrow with no increased blast (0.3%) and no evidence of myeloid neoplasm.   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

## 2024-12-30 NOTE — ASSESSMENT & PLAN NOTE
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

## 2024-12-30 NOTE — NURSING
Rituxan infusion complete. Pt resting peacefully. VSS. /64, Sats 2LNC 100%, HR 96, Temp 97.9 Axillary, Rr 17

## 2024-12-30 NOTE — PROGRESS NOTES
Pharmacokinetic Assessment Follow Up: IV Vancomycin    Vancomycin serum concentration assessment(s):    The trough level was drawn correctly and can be used to guide therapy at this time. The measurement is within the desired definitive target range of 15 to 20 mcg/mL.    Vancomycin Regimen Plan:    Continue regimen to Vancomycin 1250 mg IV every 12 hours with next serum trough concentration measured at 1315 on 12/31    Drug levels (last 3 results):  Recent Labs   Lab Result Units 12/30/24  0119   Vancomycin-Trough ug/mL 19.4       Pharmacy will continue to follow and monitor vancomycin.    Please contact pharmacy at extension [phone] for questions regarding this assessment.    Thank you for the consult,   Sharon Dixon       Patient brief summary:  Guillaume Salinas is a 69 y.o. male initiated on antimicrobial therapy with IV Vancomycin for treatment of neutropenic fever        Drug Allergies:   Review of patient's allergies indicates:  No Known Allergies    Actual Body Weight:   63kg    Renal Function:   Estimated Creatinine Clearance: 62.1 mL/min (based on SCr of 1 mg/dL).,     Dialysis Method (if applicable):  N/A    CBC (last 72 hours):  Recent Labs   Lab Result Units 12/27/24  0454 12/28/24  0507 12/29/24  0350 12/30/24  0119   WBC K/uL 1.16* 1.20* 0.90* 0.92*   Hemoglobin g/dL 9.2* 9.1* 8.3* 8.2*   Hematocrit % 26.3* 25.3* 23.6* 23.4*   Platelets K/uL 14* 10* 8* 18*   Gran % % 62.1 49.2 35.6* 43.5   Lymph % % 26.7 37.5 53.3* 46.7   Mono % % 10.3 12.5 10.0 8.7   Eosinophil % % 0.0 0.0 0.0 0.0   Basophil % % 0.0 0.0 0.0 0.0   Differential Method  Automated Automated Automated Automated       Metabolic Panel (last 72 hours):  Recent Labs   Lab Result Units 12/27/24  0454 12/28/24  0507 12/29/24  0350   Sodium mmol/L 132* 133* 134*   Potassium mmol/L 4.2 3.1* 2.5*   Chloride mmol/L 102 101 104   CO2 mmol/L 23 22* 22*   Glucose mg/dL 146* 111* 122*   BUN mg/dL 15 8 7*   Creatinine mg/dL 0.8 0.8 1.0   Albumin  g/dL 2.6* 2.5* 2.3*   Total Bilirubin mg/dL 0.5 0.6 0.5   Alkaline Phosphatase U/L 47 47 41   AST U/L 16 20 29   ALT U/L 21 21 29   Magnesium mg/dL 1.4* 1.4* 1.5*   Phosphorus mg/dL 2.1* 2.0* 1.8*       Vancomycin Administrations:  vancomycin given in the last 96 hours                     vancomycin 1,250 mg in 0.9% NaCl 250 mL IVPB (admixture device) (mg) 1,250 mg New Bag 12/30/24 0219     1,250 mg New Bag 12/29/24 1317     1,250 mg New Bag  0130    vancomycin 125 mg/5 mL oral solution 125 mg (mg) 125 mg Given 12/29/24 2117     125 mg Given  1311    vancomycin 1,250 mg in 0.9% NaCl 250 mL IVPB (admixture device) (mg) 1,250 mg New Bag 12/28/24 1332    vancomycin 1,500 mg in 0.9% NaCl 250 mL IVPB (admixture device) (mg) 1,500 mg New Bag 12/26/24 1245                    Microbiologic Results:  Microbiology Results (last 7 days)       Procedure Component Value Units Date/Time    E. coli 0157 antigen [[id number]] Collected: 12/27/24 2104    Order Status: Completed Specimen: Stool Updated: 12/29/24 2242     Shiga Toxin 1 E.coli Negative     Shiga Toxin 2 E.coli Negative    Blood culture [[id number]] Collected: 12/28/24 1901    Order Status: Completed Specimen: Blood Updated: 12/29/24 2012     Blood Culture, Routine No Growth to date      No Growth to date    Blood culture [[id number]] Collected: 12/28/24 1902    Order Status: Completed Specimen: Blood Updated: 12/29/24 2012     Blood Culture, Routine No Growth to date      No Growth to date    MRSA Screen by PCR [[id number]] Collected: 12/29/24 1726    Order Status: Completed Specimen: Nasal Swab Updated: 12/29/24 1930     MRSA SCREEN BY PCR Not Detected    Blood culture x two cultures. Draw prior to antibiotics. [[id number]] Collected: 12/26/24 1153    Order Status: Completed Specimen: Blood from Peripheral, Antecubital, Left Updated: 12/29/24 1412     Blood Culture, Routine No Growth to date      No Growth to date      No Growth to date      No Growth to date     Narrative:      Aerobic and anaerobic    Blood culture x two cultures. Draw prior to antibiotics. [[id number]] Collected: 12/26/24 1153    Order Status: Completed Specimen: Blood from Peripheral, Antecubital, Left Updated: 12/29/24 1412     Blood Culture, Routine No Growth to date      No Growth to date      No Growth to date      No Growth to date    Narrative:      Aerobic and anaerobic    MRSA Screen by PCR [[id number]] Collected: 12/29/24 1328    Order Status: Canceled Specimen: Nasal Swab     Stool culture **CANNOT BE ORDERED STAT** [[id number]] Collected: 12/27/24 2104    Order Status: Completed Specimen: Stool Updated: 12/29/24 1005     Stool Culture Nothing significant to date    Clostridium difficile EIA [[id number]] Collected: 12/28/24 2315    Order Status: Completed Specimen: Stool Updated: 12/29/24 0704     C. diff Antigen Negative     C difficile Toxins A+B, EIA Negative     Comment: Testing not recommended for children <24 months old.       Narrative:      A positive result will be a Hospital Onset CDIFF case. Review  the  Diarrhea Decision Tree to ensure testing is  appropriate. If liquid stool unable to be collected within 24  hours, this order will automatically be discontinued.  [email or web address]    Clostridium difficile EIA [[id number]]     Order Status: Canceled Specimen: Stool     Clostridium difficile EIA [[id number]]     Order Status: Canceled Specimen: Stool     Clostridium difficile EIA [[id number]] Collected: 12/27/24 2104    Order Status: Canceled Specimen: Stool     Respiratory Infection Panel (PCR), Nasopharyngeal [[id number]] Collected: 12/26/24 1508    Order Status: Completed Specimen: Nasopharyngeal Swab Updated: 12/26/24 1644     Respiratory Infection Panel Source NP Swab     Adenovirus Not Detected     Coronavirus 229E, Common Cold Virus Not Detected     Coronavirus HKU1, Common Cold Virus Not Detected     Coronavirus  NL63, Common Cold Virus Not Detected     Coronavirus OC43, Common Cold Virus Not Detected     Comment: The Coronavirus strains detected in this test cause the common cold.  These strains are not the COVID-19 (novel Coronavirus)strain   associated with the respiratory disease outbreak.          SARS-CoV2 (COVID-19) Qualitative PCR Not Detected     Human Metapneumovirus Not Detected     Human Rhinovirus/Enterovirus Not Detected     Influenza A (subtypes H1, H1-2009,H3) Not Detected     Influenza B Not Detected     Parainfluenza Virus 1 Not Detected     Parainfluenza Virus 2 Not Detected     Parainfluenza Virus 3 Not Detected     Parainfluenza Virus 4 Not Detected     Respiratory Syncytial Virus Not Detected     Bordetella Parapertussis (DG6104) Not Detected     Bordetella pertussis (ptxP) Not Detected     Chlamydia pneumoniae Not Detected     Mycoplasma pneumoniae Not Detected    Narrative:      Assay not valid for lower respiratory specimens, alternate  testing required.    Influenza A & B by Molecular [[id number]] Collected: 12/26/24 1254    Order Status: Completed Specimen: Nasopharyngeal Swab Updated: 12/26/24 1350     Influenza A, Molecular Negative     Influenza B, Molecular Negative     Flu A & B Source NP

## 2024-12-30 NOTE — HPI
69-year-old male past medical history of MDS who is status post haploidentical stem cell transplantation conditioned with FluMel 100 + PTCy+ 2Gy TBI who is currently day +102  presents to emergency department with chief complaint of weakness, fatigue, lightheadedness.  The patient was seen earlier today at his oncology appointment.  He was with his daughter and heading towards his next pulmonary appointment when they decided to come to the emergency department because he was feeling weak and fatigue.   He complained of decreased appetite since James day, severe fatigue and prolonged sleepiness. Patient endorsed ongoing diarrhea that initially improved on imodium. He had nausea without vomiting.    Ultrasound abdomen:  Gallbladder sludge and nonspecific gallbladder wall thickening.  No findings to suggest acute cholecystitis.  2. Hepatomegaly with diffuse steatosis.  3. Splenomegaly.    CT chest, abdomen, pelvis: New left lower lobe consolidation and parapneumonic effusion concerning for acute infectious process. Trace pericholecystic fluid without significant gallbladder wall thickening or gallstones. Finding may be related to edema, however cholecystitis could present similarly.  Recommend correlation for cholecystitis.    Labs:  WBC: 0.92 ANC is: 0.4  Hgb 8.2 plt 18, ferritin 10,585 >> 18,914  Na/K 141/2.5  CRT 1.3 AST/ALT 57/50    Reason for Consult? 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 requesting Flex Sig and EGD with tap water enema prep

## 2024-12-31 PROBLEM — R41.82 ALTERED MENTAL STATUS: Status: ACTIVE | Noted: 2024-12-31

## 2024-12-31 LAB
ABO + RH BLD: NORMAL
ALBUMIN SERPL BCP-MCNC: 2.1 G/DL (ref 3.5–5.2)
ALP SERPL-CCNC: 43 U/L (ref 40–150)
ALT SERPL W/O P-5'-P-CCNC: 55 U/L (ref 10–44)
ANION GAP SERPL CALC-SCNC: 9 MMOL/L (ref 8–16)
ANISOCYTOSIS BLD QL SMEAR: SLIGHT
AST SERPL-CCNC: 55 U/L (ref 10–40)
BACTERIA BLD CULT: NORMAL
BACTERIA BLD CULT: NORMAL
BASO STIPL BLD QL SMEAR: ABNORMAL
BASOPHILS # BLD AUTO: 0 K/UL (ref 0–0.2)
BASOPHILS NFR BLD: 0 % (ref 0–1.9)
BILIRUB SERPL-MCNC: 0.3 MG/DL (ref 0.1–1)
BLD GP AB SCN CELLS X3 SERPL QL: NORMAL
BLD PROD TYP BPU: NORMAL
BLOOD UNIT EXPIRATION DATE: NORMAL
BLOOD UNIT TYPE CODE: 7300
BLOOD UNIT TYPE: NORMAL
BODY SITE - BONE MARROW: NORMAL
BUN SERPL-MCNC: 13 MG/DL (ref 8–23)
CALCIUM SERPL-MCNC: 7.8 MG/DL (ref 8.7–10.5)
CHLORIDE SERPL-SCNC: 109 MMOL/L (ref 95–110)
CLINICAL DIAGNOSIS - BONE MARROW: NORMAL
CO2 SERPL-SCNC: 19 MMOL/L (ref 23–29)
CODING SYSTEM: NORMAL
CREAT SERPL-MCNC: 1.3 MG/DL (ref 0.5–1.4)
CROSSMATCH INTERPRETATION: NORMAL
DACRYOCYTES BLD QL SMEAR: ABNORMAL
DIFFERENTIAL METHOD BLD: ABNORMAL
DISPENSE STATUS: NORMAL
EOSINOPHIL # BLD AUTO: 0 K/UL (ref 0–0.5)
EOSINOPHIL NFR BLD: 0 % (ref 0–8)
ERYTHROCYTE [DISTWIDTH] IN BLOOD BY AUTOMATED COUNT: 15.1 % (ref 11.5–14.5)
EST. GFR  (NO RACE VARIABLE): 59.5 ML/MIN/1.73 M^2
FERRITIN SERPL-MCNC: ABNORMAL NG/ML (ref 20–300)
FLOW CYTOMETRY ANTIBODIES ANALYZED - BONE MARROW: NORMAL
FLOW CYTOMETRY COMMENT - BONE MARROW: NORMAL
FLOW CYTOMETRY INTERPRETATION - BONE MARROW: NORMAL
GLUCOSE SERPL-MCNC: 227 MG/DL (ref 70–110)
HCT VFR BLD AUTO: 23.5 % (ref 40–54)
HGB BLD-MCNC: 8.1 G/DL (ref 14–18)
IMM GRANULOCYTES # BLD AUTO: 0 K/UL (ref 0–0.04)
IMM GRANULOCYTES NFR BLD AUTO: 0 % (ref 0–0.5)
LYMPHOCYTES # BLD AUTO: 0.6 K/UL (ref 1–4.8)
LYMPHOCYTES NFR BLD: 51.7 % (ref 18–48)
MAGNESIUM SERPL-MCNC: 1.6 MG/DL (ref 1.6–2.6)
MCH RBC QN AUTO: 36.7 PG (ref 27–31)
MCHC RBC AUTO-ENTMCNC: 34.5 G/DL (ref 32–36)
MCV RBC AUTO: 106 FL (ref 82–98)
MONOCYTES # BLD AUTO: 0.2 K/UL (ref 0.3–1)
MONOCYTES NFR BLD: 14.7 % (ref 4–15)
NEUTROPHILS # BLD AUTO: 0.4 K/UL (ref 1.8–7.7)
NEUTROPHILS NFR BLD: 33.6 % (ref 38–73)
NRBC BLD-RTO: 0 /100 WBC
OVALOCYTES BLD QL SMEAR: ABNORMAL
PHOSPHATE SERPL-MCNC: 2.3 MG/DL (ref 2.7–4.5)
PLATELET # BLD AUTO: 9 K/UL (ref 150–450)
PLATELET BLD QL SMEAR: ABNORMAL
PMV BLD AUTO: 14.5 FL (ref 9.2–12.9)
POIKILOCYTOSIS BLD QL SMEAR: SLIGHT
POLYCHROMASIA BLD QL SMEAR: ABNORMAL
POTASSIUM SERPL-SCNC: 2.9 MMOL/L (ref 3.5–5.1)
PROT SERPL-MCNC: 4.6 G/DL (ref 6–8.4)
RBC # BLD AUTO: 2.21 M/UL (ref 4.6–6.2)
SODIUM SERPL-SCNC: 137 MMOL/L (ref 136–145)
SOL IL2 RECEP SERPL-MCNC: 2952.1 PG/ML (ref 175.3–858.2)
SPECIMEN OUTDATE: NORMAL
UNIT NUMBER: NORMAL
WBC # BLD AUTO: 1.16 K/UL (ref 3.9–12.7)

## 2024-12-31 PROCEDURE — 30233R1 TRANSFUSION OF NONAUTOLOGOUS PLATELETS INTO PERIPHERAL VEIN, PERCUTANEOUS APPROACH: ICD-10-PCS | Performed by: INTERNAL MEDICINE

## 2024-12-31 PROCEDURE — 25000003 PHARM REV CODE 250: Performed by: NURSE PRACTITIONER

## 2024-12-31 PROCEDURE — 86900 BLOOD TYPING SEROLOGIC ABO: CPT | Performed by: INTERNAL MEDICINE

## 2024-12-31 PROCEDURE — 63600175 PHARM REV CODE 636 W HCPCS: Performed by: NURSE PRACTITIONER

## 2024-12-31 PROCEDURE — 85025 COMPLETE CBC W/AUTO DIFF WBC: CPT | Performed by: INTERNAL MEDICINE

## 2024-12-31 PROCEDURE — 25000003 PHARM REV CODE 250: Performed by: INTERNAL MEDICINE

## 2024-12-31 PROCEDURE — 20600001 HC STEP DOWN PRIVATE ROOM

## 2024-12-31 PROCEDURE — 84100 ASSAY OF PHOSPHORUS: CPT | Performed by: INTERNAL MEDICINE

## 2024-12-31 PROCEDURE — 80053 COMPREHEN METABOLIC PANEL: CPT | Performed by: INTERNAL MEDICINE

## 2024-12-31 PROCEDURE — 36430 TRANSFUSION BLD/BLD COMPNT: CPT

## 2024-12-31 PROCEDURE — P9037 PLATE PHERES LEUKOREDU IRRAD: HCPCS | Performed by: NURSE PRACTITIONER

## 2024-12-31 PROCEDURE — 82728 ASSAY OF FERRITIN: CPT | Performed by: NURSE PRACTITIONER

## 2024-12-31 PROCEDURE — 99233 SBSQ HOSP IP/OBS HIGH 50: CPT | Mod: FS,GC,, | Performed by: INTERNAL MEDICINE

## 2024-12-31 PROCEDURE — 63600175 PHARM REV CODE 636 W HCPCS: Performed by: INTERNAL MEDICINE

## 2024-12-31 PROCEDURE — 83735 ASSAY OF MAGNESIUM: CPT | Performed by: INTERNAL MEDICINE

## 2024-12-31 RX ORDER — HYDROCODONE BITARTRATE AND ACETAMINOPHEN 500; 5 MG/1; MG/1
TABLET ORAL
Status: DISCONTINUED | OUTPATIENT
Start: 2024-12-31 | End: 2025-01-06

## 2024-12-31 RX ORDER — ACETAMINOPHEN 325 MG/1
650 TABLET ORAL ONCE AS NEEDED
Status: COMPLETED | OUTPATIENT
Start: 2024-12-31 | End: 2024-12-31

## 2024-12-31 RX ORDER — POTASSIUM CHLORIDE 7.45 MG/ML
10 INJECTION INTRAVENOUS
Status: COMPLETED | OUTPATIENT
Start: 2024-12-31 | End: 2024-12-31

## 2024-12-31 RX ORDER — DIPHENHYDRAMINE HCL 25 MG
25 CAPSULE ORAL ONCE AS NEEDED
Status: COMPLETED | OUTPATIENT
Start: 2024-12-31 | End: 2024-12-31

## 2024-12-31 RX ADMIN — Medication 1 TABLET: at 05:12

## 2024-12-31 RX ADMIN — ACYCLOVIR 800 MG: 800 TABLET ORAL at 08:12

## 2024-12-31 RX ADMIN — MUPIROCIN: 20 OINTMENT TOPICAL at 09:12

## 2024-12-31 RX ADMIN — ATOVAQUONE 1500 MG: 750 SUSPENSION ORAL at 08:12

## 2024-12-31 RX ADMIN — ACYCLOVIR 800 MG: 800 TABLET ORAL at 09:12

## 2024-12-31 RX ADMIN — PIPERACILLIN SODIUM AND TAZOBACTAM SODIUM 4.5 G: 4; .5 INJECTION, POWDER, LYOPHILIZED, FOR SOLUTION INTRAVENOUS at 04:12

## 2024-12-31 RX ADMIN — POTASSIUM CHLORIDE 10 MEQ: 7.46 INJECTION, SOLUTION INTRAVENOUS at 08:12

## 2024-12-31 RX ADMIN — POTASSIUM CHLORIDE 20 MEQ: 1500 TABLET, EXTENDED RELEASE ORAL at 01:12

## 2024-12-31 RX ADMIN — ROPINIROLE HYDROCHLORIDE 0.5 MG: 0.25 TABLET, FILM COATED ORAL at 09:12

## 2024-12-31 RX ADMIN — METHYLPREDNISOLONE SODIUM SUCCINATE 100 MG: 125 INJECTION, POWDER, LYOPHILIZED, FOR SOLUTION INTRAMUSCULAR; INTRAVENOUS at 10:12

## 2024-12-31 RX ADMIN — POTASSIUM CHLORIDE 20 MEQ: 1500 TABLET, EXTENDED RELEASE ORAL at 09:12

## 2024-12-31 RX ADMIN — LOPERAMIDE HYDROCHLORIDE 2 MG: 2 CAPSULE ORAL at 09:12

## 2024-12-31 RX ADMIN — ACETAMINOPHEN 650 MG: 325 TABLET ORAL at 08:12

## 2024-12-31 RX ADMIN — PIPERACILLIN SODIUM AND TAZOBACTAM SODIUM 4.5 G: 4; .5 INJECTION, POWDER, LYOPHILIZED, FOR SOLUTION INTRAVENOUS at 12:12

## 2024-12-31 RX ADMIN — PANTOPRAZOLE SODIUM 40 MG: 40 TABLET, DELAYED RELEASE ORAL at 08:12

## 2024-12-31 RX ADMIN — PIPERACILLIN SODIUM AND TAZOBACTAM SODIUM 4.5 G: 4; .5 INJECTION, POWDER, LYOPHILIZED, FOR SOLUTION INTRAVENOUS at 09:12

## 2024-12-31 RX ADMIN — SODIUM CHLORIDE: 9 INJECTION, SOLUTION INTRAVENOUS at 02:12

## 2024-12-31 RX ADMIN — POTASSIUM CHLORIDE 10 MEQ: 7.46 INJECTION, SOLUTION INTRAVENOUS at 11:12

## 2024-12-31 RX ADMIN — SODIUM CHLORIDE: 9 INJECTION, SOLUTION INTRAVENOUS at 10:12

## 2024-12-31 RX ADMIN — LOPERAMIDE HYDROCHLORIDE 2 MG: 2 CAPSULE ORAL at 08:12

## 2024-12-31 RX ADMIN — SODIUM CHLORIDE: 9 INJECTION, SOLUTION INTRAVENOUS at 09:12

## 2024-12-31 RX ADMIN — LOPERAMIDE HYDROCHLORIDE 2 MG: 2 CAPSULE ORAL at 05:12

## 2024-12-31 RX ADMIN — POTASSIUM CHLORIDE 10 MEQ: 7.46 INJECTION, SOLUTION INTRAVENOUS at 01:12

## 2024-12-31 RX ADMIN — GABAPENTIN 600 MG: 300 CAPSULE ORAL at 09:12

## 2024-12-31 RX ADMIN — Medication 1 TABLET: at 01:12

## 2024-12-31 RX ADMIN — POTASSIUM CHLORIDE 10 MEQ: 7.46 INJECTION, SOLUTION INTRAVENOUS at 10:12

## 2024-12-31 RX ADMIN — ZOLPIDEM TARTRATE 5 MG: 5 TABLET, FILM COATED ORAL at 09:12

## 2024-12-31 RX ADMIN — Medication 2 MG: at 10:12

## 2024-12-31 RX ADMIN — FOLIC ACID 1 MG: 1 TABLET ORAL at 08:12

## 2024-12-31 RX ADMIN — DIPHENHYDRAMINE HYDROCHLORIDE 25 MG: 25 CAPSULE ORAL at 08:12

## 2024-12-31 RX ADMIN — Medication 1 TABLET: at 06:12

## 2024-12-31 RX ADMIN — LOPERAMIDE HYDROCHLORIDE 2 MG: 2 CAPSULE ORAL at 01:12

## 2024-12-31 RX ADMIN — TACROLIMUS 0.5 MG: 0.5 CAPSULE ORAL at 08:12

## 2024-12-31 RX ADMIN — POTASSIUM CHLORIDE 20 MEQ: 1500 TABLET, EXTENDED RELEASE ORAL at 10:12

## 2024-12-31 RX ADMIN — POSACONAZOLE 300 MG: 100 TABLET, DELAYED RELEASE ORAL at 08:12

## 2024-12-31 RX ADMIN — Medication 1 TABLET: at 09:12

## 2024-12-31 NOTE — RESPIRATORY THERAPY
"RAPID RESPONSE RESPIRATORY THERAPY PROACTIVE ROUNDING NOTE             Time of visit: 815     Code Status: Full Code   : 1955  Bed: 805/805 A:   MRN: [id number]  Time spent at the bedside: < 15 min    SITUATION    Evaluated patient for: HFNC Compliance     BACKGROUND    Patient has a past medical history of Anticoagulant long-term use, Coronary artery disease, Hypertension, Myelodysplastic syndrome, and Peripheral vascular disease, unspecified.    24 Hours Vitals Range:  Temp:  [97.4 °F (36.3 °C)-97.9 °F (36.6 °C)]   Pulse:  [69-84]   Resp:  [16-18]   BP: (100-133)/(62-83)   SpO2:  [94 %-99 %]     Labs:    Recent Labs     24  0119 24  2130 24  0443    138 137   K 2.5* 3.3* 2.9*   * 109 109   CO2 22* 19* 19*   BUN 8 12 13   CREATININE 1.3 1.3 1.3    267* 227*   PHOS 2.3* 2.1* 2.3*   MG 1.6 1.5* 1.6        No results for input(s): "PH", "PCO2", "PO2", "HCO3", "POCSATURATED", "BE" in the last 72 hours.    ASSESSMENT/INTERVENTIONS    Pt resting with family bedside has no respiratory interventions required.    Last VS   Temp: 97.5 °F (36.4 °C) ( 1448)  Pulse: 70 ( 1457)  Resp: 18 ( 1448)  BP: 128/83 ( 1448)  SpO2: 97 % ( 144)    Level of Consciousness: Level of Consciousness (AVPU): alert  Respiratory Effort: Respiratory Effort: Unlabored Expansion/Accessory Muscle Usage: Expansion/Accessory Muscles/Retractions: no use of accessory muscles, no retractions, expansion symmetric  All Lung Field Breath Sounds: All Lung Fields Breath Sounds: clear, equal bilaterally  O2 Device/Concentration: R.A.  Was the O2 device able to be weaned? No  Ambu at bedside: y    Active Orders   Respiratory Care    Oxygen Continuous     Frequency: Continuous     Number of Occurrences: Until Specified     Order Questions:      Device type: High flow      Device: Non-Rebreathing Mask      Titrate O2 per Oxygen Titration Protocol: Yes      To maintain SpO2 goal of: >= 90%      " Notify MD of: Inability to achieve desired SpO2; Sudden change in patient status and requires 20% increase in FiO2; Patient requires >60% FiO2      LPM: 2       RECOMMENDATIONS    We recommend: RRT Recs: Continue POC per primary team.      FOLLOW-UP    Please call back the Rapid Response RT, Konstantin Colin, APRIL at x [phone] for any questions or concerns.

## 2024-12-31 NOTE — SUBJECTIVE & OBJECTIVE
Subjective:     Interval History: Day + 103 from a kalli/Rosalee/TBI + PtCy Haplo (son) BMT for high grade MDS. Last fever was ~ 24 hours ago. VSS. Ferritin up to 26K today. BMBx performed yesterday and results pending. Day 2 of methylpred. Family reports AMS that started over night. States that patient is saying things that don't make sense. May be due to steroids/ meds. GI deferring flex sig until ANC is > 500. Multiple BMs yesterday, but only one over night per family and nursing documentation. May be responding to steroids if this is GVHD. Weekly CMV and EBV ordered.    Objective:     Vital Signs (Most Recent):  Temp: 97.9 °F (36.6 °C) (12/31/24 0759)  Pulse: 71 (12/31/24 0759)  Resp: 18 (12/31/24 0759)  BP: 133/68 (12/31/24 0759)  SpO2: (!) 94 % (12/31/24 0759) Vital Signs (24h Range):  Temp:  [97.4 °F (36.3 °C)-99.3 °F (37.4 °C)] 97.9 °F (36.6 °C)  Pulse:  [] 71  Resp:  [16-18] 18  SpO2:  [94 %-99 %] 94 %  BP: (100-133)/(62-75) 133/68     Weight: 63 kg (138 lb 14.2 oz)  Body mass index is 20.51 kg/m².  Body surface area is 1.75 meters squared.      Intake/Output - Last 3 Shifts         12/29 0700 12/30 0659 12/30 0700 12/31 0659 12/31 0700 01/01 0659    P.O.  600 300    I.V. (mL/kg)       Blood 294      IV Piggyback 998.5      Total Intake(mL/kg) 1292.5 (20.5) 600 (9.5) 300 (4.8)    Urine (mL/kg/hr)  200 (0.1)     Stool  0     Total Output  200     Net +1292.5 +400 +300           Urine Occurrence 9 x 2 x 0 x    Stool Occurrence 9 x 1 x 0 x    Emesis Occurrence   0 x             Physical Exam  Vitals and nursing note reviewed.   Constitutional:       Appearance: He is well-developed. He is ill-appearing.   HENT:      Head: Normocephalic and atraumatic.      Right Ear: External ear normal.      Left Ear: External ear normal.      Mouth/Throat:      Mouth: Mucous membranes are dry.      Pharynx: No oropharyngeal exudate.   Eyes:      Extraocular Movements: Extraocular movements intact.       Conjunctiva/sclera: Conjunctivae normal.   Cardiovascular:      Rate and Rhythm: Normal rate and regular rhythm.      Pulses: Normal pulses.      Heart sounds: Normal heart sounds. No murmur heard.  Pulmonary:      Effort: Pulmonary effort is normal. No respiratory distress.      Breath sounds: Normal breath sounds. No stridor. No wheezing or rales.   Abdominal:      General: Bowel sounds are normal.      Palpations: Abdomen is soft.      Tenderness: There is no abdominal tenderness.   Musculoskeletal:         General: Normal range of motion.      Cervical back: Normal range of motion and neck supple.      Right lower leg: No edema.      Left lower leg: No edema.   Skin:     General: Skin is warm and dry.      Findings: Bruising present. No erythema or rash.      Comments: Right triple lumen vaughn clean and dry with no signs of infection   Neurological:      General: No focal deficit present.      Mental Status: He is alert and oriented to person, place, and time.      Motor: Weakness present.   Psychiatric:         Mood and Affect: Mood normal.         Behavior: Behavior normal.         Thought Content: Thought content normal.         Judgment: Judgment normal.            Significant Labs:   CBC:   Recent Labs   Lab 12/30/24  0119 12/31/24  0443   WBC 0.92* 1.16*   HGB 8.2* 8.1*   HCT 23.4* 23.5*   PLT 18*  --     and CMP:   Recent Labs   Lab 12/30/24  0119 12/30/24  2130 12/31/24  0443    138 137   K 2.5* 3.3* 2.9*   * 109 109   CO2 22* 19* 19*    267* 227*   BUN 8 12 13   CREATININE 1.3 1.3 1.3   CALCIUM 7.7* 7.7* 7.8*   PROT 4.7*  --  4.6*   ALBUMIN 2.3*  --  2.1*   BILITOT 0.5  --  0.3   ALKPHOS 42  --  43   AST 57*  --  55*   ALT 50*  --  55*   ANIONGAP 8 10 9       Diagnostic Results:  None

## 2024-12-31 NOTE — ASSESSMENT & PLAN NOTE
Status post haploidentical stem cell transplantation conditioned with FluMel 100 + PTCy+ 2Gy TBI . Currently Day+ 103. Engrafted on 10/09/24 day +20.  Day 30 bone marrow (11/5/2024) showing mildly hypercellular marrow with no increased blast (0.3%) and no evidence of myeloid neoplasm.   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 Day 2 of methylpred. GI deferring scope until ANC is > 500.  - daily acute and chronic (starting 21/28 at Day +100) GVHD charting while inpatient  - monitor weekly EBV/CMV; last EBV on 12/26 is [id number] ; last CMV same date undetected  - continue cmv ppx with letermovir

## 2024-12-31 NOTE — ASSESSMENT & PLAN NOTE
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

## 2024-12-31 NOTE — PROGRESS NOTES
[facility] Ma - Oncology ([facility])  Hematology  Bone Marrow Transplant  Progress Note    Patient Name: Guillaume Salinas  Admission Date: 12/26/2024  Hospital Length of Stay: 4 days  Code Status: Full Code    Subjective:     Interval History: Day + 103 from a kalli/Rosalee/TBI + PtCy Haplo (son) BMT for high grade MDS. Last fever was ~ 24 hours ago. VSS. Ferritin up to 26K today. BMBx performed yesterday and results pending. Day 2 of methylpred. Family reports AMS that started over night. States that patient is saying things that don't make sense. May be due to steroids/ meds. GI deferring flex sig until ANC is > 500. Multiple BMs yesterday, but only one over night per family and nursing documentation. May be responding to steroids if this is GVHD. Weekly CMV and EBV ordered.    Objective:     Vital Signs (Most Recent):  Temp: 97.9 °F (36.6 °C) (12/31/24 0759)  Pulse: 71 (12/31/24 0759)  Resp: 18 (12/31/24 0759)  BP: 133/68 (12/31/24 0759)  SpO2: (!) 94 % (12/31/24 0759) Vital Signs (24h Range):  Temp:  [97.4 °F (36.3 °C)-99.3 °F (37.4 °C)] 97.9 °F (36.6 °C)  Pulse:  [] 71  Resp:  [16-18] 18  SpO2:  [94 %-99 %] 94 %  BP: (100-133)/(62-75) 133/68     Weight: 63 kg (138 lb 14.2 oz)  Body mass index is 20.51 kg/m².  Body surface area is 1.75 meters squared.      Intake/Output - Last 3 Shifts         12/29 0700  12/30 0659 12/30 0700 12/31 0659 12/31 0700 01/01 0659    P.O.  600 300    I.V. (mL/kg)       Blood 294      IV Piggyback 998.5      Total Intake(mL/kg) 1292.5 (20.5) 600 (9.5) 300 (4.8)    Urine (mL/kg/hr)  200 (0.1)     Stool  0     Total Output  200     Net +1292.5 +400 +300           Urine Occurrence 9 x 2 x 0 x    Stool Occurrence 9 x 1 x 0 x    Emesis Occurrence   0 x             Physical Exam  Vitals and nursing note reviewed.   Constitutional:       Appearance: He is well-developed. He is ill-appearing.   HENT:      Head: Normocephalic and atraumatic.      Right Ear: External ear normal.      Left  Ear: External ear normal.      Mouth/Throat:      Mouth: Mucous membranes are dry.      Pharynx: No oropharyngeal exudate.   Eyes:      Extraocular Movements: Extraocular movements intact.      Conjunctiva/sclera: Conjunctivae normal.   Cardiovascular:      Rate and Rhythm: Normal rate and regular rhythm.      Pulses: Normal pulses.      Heart sounds: Normal heart sounds. No murmur heard.  Pulmonary:      Effort: Pulmonary effort is normal. No respiratory distress.      Breath sounds: Normal breath sounds. No stridor. No wheezing or rales.   Abdominal:      General: Bowel sounds are normal.      Palpations: Abdomen is soft.      Tenderness: There is no abdominal tenderness.   Musculoskeletal:         General: Normal range of motion.      Cervical back: Normal range of motion and neck supple.      Right lower leg: No edema.      Left lower leg: No edema.   Skin:     General: Skin is warm and dry.      Findings: Bruising present. No erythema or rash.      Comments: Right triple lumen vaughn clean and dry with no signs of infection   Neurological:      General: No focal deficit present.      Mental Status: He is alert and oriented to person, place, and time.      Motor: Weakness present.   Psychiatric:         Mood and Affect: Mood normal.         Behavior: Behavior normal.         Thought Content: Thought content normal.         Judgment: Judgment normal.            Significant Labs:   CBC:   Recent Labs   Lab 12/30/24  0119 12/31/24  0443   WBC 0.92* 1.16*   HGB 8.2* 8.1*   HCT 23.4* 23.5*   PLT 18*  --     and CMP:   Recent Labs   Lab 12/30/24  0119 12/30/24  2130 12/31/24  0443    138 137   K 2.5* 3.3* 2.9*   * 109 109   CO2 22* 19* 19*    267* 227*   BUN 8 12 13   CREATININE 1.3 1.3 1.3   CALCIUM 7.7* 7.7* 7.8*   PROT 4.7*  --  4.6*   ALBUMIN 2.3*  --  2.1*   BILITOT 0.5  --  0.3   ALKPHOS 42  --  43   AST 57*  --  55*   ALT 50*  --  55*   ANIONGAP 8 10 9       Diagnostic  Results:  None  Assessment/Plan:     * Neutropenic fever  - last fever was ~ 24 hours ago  - blood cultures x2 NGTD  - UA negative  - RIP/covid negative  - CXR with chronic LLL infiltrate; unable to determine severity  - CT c/a/p with new LLL consolidation, presumed infectious; trace pericholecystic fluid- edema vs. Cholecystitis  - given zosyn and vanc x1 in ED;   - Day 4 zosyn. Stopped IV and po Vanc 12/30  - Fever possible from HLH. Steroids started 12/30.    S/P allogeneic bone marrow transplant  Status post haploidentical stem cell transplantation conditioned with FluMel 100 + PTCy+ 2Gy TBI . Currently Day+ 103. Engrafted on 10/09/24 day +20.  Day 30 bone marrow (11/5/2024) showing mildly hypercellular marrow with no increased blast (0.3%) and no evidence of myeloid neoplasm.   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 Day 2 of methylpred. GI deferring scope until ANC is > 500.  - daily acute and chronic (starting 21/28 at Day +100) GVHD charting while inpatient  - monitor weekly EBV/CMV; last EBV on 12/26 is 21643 ; last CMV same date undetected  - continue cmv ppx with letermovir    HLH (hemophagocytic lymphohistiocytosis)  - Pancytopenia in the setting of uptrending EBV. Last EBV level from 12/26 was 24K. Trending weekly.  - Ferritin up to 26K today. Continue daily levels.  - IL 2 in process  - Received Rituxan on 12/29  - Bone marrow biopsy completed today 12/30  - IV Methylpred 2mg/kg/day started 12/30. Today is day 2.    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 He started budesonide 3mg TID on 10/28/24. Due to persistent nausea despite budesonide so started systemic steroids 11/1 at 0.5 mg/kg and his steroid completed 12/8/24.  - see tacro dosing under allogeneic BMT  - now with worsening diarrhea, GI consulted for flex sig and EGD. Deferring until ANC > 500  - methylpred 2mg/kg/day started 12/30. Today is day 2.    Altered mental status  - Daughter reports that patient was saying things that did not make sense over night and this morning  - Started steroids yesterday. Suspect steroid-/medication-induced.    Myelodysplastic syndrome  - Status post treatment with azacitidine 75 mg/m2 daily x7 days plus venetoclax 100mg (voriconazole) daily x14 of 28 days.Venetoclax decreased to 7 days with cycle 3 until completion of 11 cycles.   - S/p Haplo BMT as above.   - No plans for maintenance at this time.     Hypophosphatemia  - daily phos level  - replace per PRN electrolyte protocol    Hypokalemia  - Likely due to poor absorption with possible GI GVHD  - Repleting via PRN PO order set and IV today    Folate deficiency  - Continue home folate    Copper deficiency  - Continue home copper    RLS (restless legs syndrome)  - Continue home ropinirole     Pancytopenia  - Due to underlying disease and chemotherapy/transplant  - Transfuse for Hgb <7 g/dL and platelets <10k.   - Folate and copper deficient, on supplementation.   - Promacta sent in on 12/9/24. Working on aditya for financial assistance for promacta.   - Changed TMP/SMX to atovaquone.   - continue ppx antimicrobials as above  - elevated ferritin, IL2 in process for suspected HLH  - Received Rituxan on 12/29. Bone marrow biopsy completed 12/30. Results pending. Day 2 of methylpred.    Hypotension  - Etiology of  hypotension is likely decreased oral intake, diarrhea, and fevers  - Improved with continuous IVF    ADDISON (acute kidney injury)  - Likely prerenal due to decreased PO intake. Received 2L IVF in ED; on continuous IVF  RESOLVED    Insomnia  - Continue home Ambien    Hypertension, essential  - Holding home antihypertensives given hypotension on presentation    Physical debility  - PT/OT consulted on admit        VTE Risk Mitigation (From admission, onward)           Ordered     heparin, porcine (PF) 100 unit/mL injection flush 500 Units  As needed (PRN)         12/29/24 1443     Reason for No Pharmacological VTE Prophylaxis  Once        Question:  Reasons:  Answer:  Thrombocytopenia    12/26/24 1330     IP VTE HIGH RISK PATIENT  Once         12/26/24 1330     Place sequential compression device  Until discontinued         12/26/24 1330                    Disposition: Inpatient.    Luz Napoles, NP  Bone Marrow Transplant  [facility] Ma - Oncology ([facility])

## 2024-12-31 NOTE — PROGRESS NOTES
Admit Assessment    Patient Identification  Guillaume Salinas   :  1955  Admit Date:  2024  Attending Provider:  Darian Best MD              Referral:   Pt was admitted to  with a diagnosis of Neutropenic fever, and was admitted this hospital stay due to Screening for cardiovascular condition [Z13.6]  Chest pain [R07.9]  Hypotension due to hypovolemia [E86.1]  Sepsis, due to unspecified organism, unspecified whether acute organ dysfunction present [A41.9].   is involved was referred to the Social Work Department via routine referral.  Patient presents as a 69 y.o. year old  male.    Persons interviewed: patient and family.    Living Situation:  Lives with his wife Yennifer ([phone]).  Independent with ADLs with use of DME.      Resides at [address] [place] [address]  [place] [address],   phone: [phone] (home).      (RETIRED) Functional Status Prior  Ambulation Prior: 0-->independent  Transferrin-->independent  Toiletin-->independent    Current or Past Agencies and Description of Services/Supplies    DME  Agency Name: none current  Equipment Currently Used at Home: raised toilet, shower chair, grab bar, rollator    Home Health  Agency Name: [facility]  Agency Phone Number: [phone]  Services: nursing, PT/OT    IV Infusion  Agency Name: none    Nutrition: Oral.      Outpatient Pharmacy:     [id number] [address] AT [facility]  [address]  [place] [address]  Phone: [phone] Fax: [phone]    [facility] [place], [place] - [address]  [address]  [place] [address]  Phone: [phone] Fax: [phone]      Patient Preference of agencies include: [facility]    Patient/Caregiver informed of right to choose providers or agencies.  Patient provides permission to release any necessary information to [facility] and to Non[facility] agencies as needed to  facilitate patient care, treatment planning, and patient discharge planning.  Written and verbal resources provided.      Coping   Coping well with support of family.  Distress score of 7 prior to admit related to pain and wekness; feeling well today.       Adjustment to Diagnosis and Treatment  Adequate.      Emotional/Behavioral/Cognitive Issues   Hx of insomnia; compliant with Ambien 10mg HS.  Recent confusion and hallucinations attributed to steroids/medication changes.           History/Current Symptoms of Anxiety/Depression: No:   History/Current Substance Use:   Social History     Tobacco Use    Smoking status: Former     Current packs/day: 0.00     Average packs/day: 0.3 packs/day for 50.0 years (12.5 ttl pk-yrs)     Types: Cigarettes     Start date: 3/1/1973     Quit date: 3/1/2023     Years since quittin.8     Passive exposure: Past    Smokeless tobacco: Never   Substance and Sexual Activity    Alcohol use: Not Currently    Drug use: Never    Sexual activity: Not Currently     Partners: Female       Indications of Abuse/Neglect: No:   Abuse Screen (yes response referral indicated)  Feels Unsafe at Home or Work/School: no  Feels Threatened by Someone: no  Does anyone try to keep you from having contact with others or doing things outside your home?: no  Physical Signs of Abuse Present: no    Financial:  Payer/Plan Subscr  Sex Relation Sub. Ins. ID Effective Group Num   1. [employer]* JAVIER CARD* 1955 Male Self [id number]                                    [id number]      Other identified concerns/needs: current recs for resumed HH and RW.  RW may not be covered by insurance after receiving rollator in October.      Plan: return home to care of family with home health support.      Interventions/Referrals: Pending referral to resume current HH.  Past assistance from [employer] patient assistance; $557.15 remaining in regular patient assistance.  Multiple pharmacy assistance plans  in place; daughter reports completing application for Prevymis renewal with [employer], but receiving denial for Promacta from [employer].  Will investigate.    Patient/caregiver engaged in treatment planning process.     providing psychosocial and supportive counseling, resources, education, assistance and discharge planning as appropriate.  Patient/caregiver state understanding of  available resources,  following, remains available.  Given Swer contact info while covering for following SWer Robin Robbins; will update Mata on his return.

## 2024-12-31 NOTE — ASSESSMENT & PLAN NOTE
- GVHD prophylaxis with Post-transplant cyclophosphamide, Tacrolimus, MMF (MMF d/c on D+35).   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 He started budesonide 3mg TID on 10/28/24. Due to persistent nausea despite budesonide so started systemic steroids 11/1 at 0.5 mg/kg and his steroid completed 12/8/24.  - see tacro dosing under allogeneic BMT  - now with worsening diarrhea, GI consulted for flex sig and EGD. Deferring until ANC > 500  - methylpred 2mg/kg/day started 12/30. Today is day 2.

## 2024-12-31 NOTE — PLAN OF CARE
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 Patient is AAOx4. No acute events this shift. Up with assist and family at bedside. Afebrile & VSS on room air. 1 unit of platelets administered, premedicated and pt tolerated well. No PRNs needed. Electrolytes closely monitored and replaced per PRN scale. Ambulates with assist to the bathroom; educated on importance of I/O recording. CHG wipes provided and encouraged use. Tolerating diet with good intake and voiding without difficulty. Pt remaining free from falls or injury throughout shift; bed locked and in lowest position; call light within reach. POC reviewed with patient and family at bedside. Patient involved in care. All needs addressed. Frequent rounding performed. Patient is stable at this time.

## 2024-12-31 NOTE — ASSESSMENT & PLAN NOTE
- Pancytopenia in the setting of uptrending EBV. Last EBV level from 12/26 was 24K. Trending weekly.  - Ferritin up to 26K today. Continue daily levels.  - IL 2 in process  - Received Rituxan on 12/29  - Bone marrow biopsy completed today 12/30  - IV Methylpred 2mg/kg/day started 12/30. Today is day 2.

## 2024-12-31 NOTE — ASSESSMENT & PLAN NOTE
- Etiology of hypotension is likely decreased oral intake, diarrhea, and fevers  - Improved with continuous IVF

## 2024-12-31 NOTE — ASSESSMENT & PLAN NOTE
- last fever was ~ 24 hours ago  - blood cultures x2 NGTD  - UA negative  - RIP/covid negative  - CXR with chronic LLL infiltrate; unable to determine severity  - CT c/a/p with new LLL consolidation, presumed infectious; trace pericholecystic fluid- edema vs. Cholecystitis  - given zosyn and vanc x1 in ED;   - Day 4 zosyn. Stopped IV and po Vanc 12/30  - Fever possible from HLH. Steroids started 12/30.

## 2024-12-31 NOTE — PROGRESS NOTES
Spoke to [employer] ([phone]) to check the status of the patient's Promacta after family reported notice of denial. Representative reports, he is approved for assistance from 12/16/24 through 12/31/25, but the first fill is not yet available.  The patient will be contacted by phone when the first shipment is sent.  Will follow for this admission and update following Octavio Robbins on his return.

## 2024-12-31 NOTE — CARE UPDATE
"RAPID RESPONSE NURSE CHART REVIEW        Chart Reviewed: 12/30/2024, 9:31 PM    MRN: [id number]  Bed: 805/805 A    Dx: Neutropenic fever    Guillaume Salinas has a past medical history of Anticoagulant long-term use, Coronary artery disease, Hypertension, Myelodysplastic syndrome, and Peripheral vascular disease, unspecified.    Last VS: /73   Pulse 74   Temp 97.4 °F (36.3 °C)   Resp 16   Ht 5' 9" (1.753 m)   Wt 63 kg (138 lb 14.2 oz)   SpO2 99%   BMI 20.51 kg/m²     24H Vital Sign Range:  Temp:  [97.4 °F (36.3 °C)-103 °F (39.4 °C)]   Pulse:  []   Resp:  [16-18]   BP: ()/(57-73)   SpO2:  [96 %-100 %]     Level of Consciousness (AVPU): alert    Recent Labs     12/28/24  0507 12/29/24  0350 12/30/24  0119   WBC 1.20* 0.90* 0.92*   HGB 9.1* 8.3* 8.2*   HCT 25.3* 23.6* 23.4*   PLT 10* 8* 18*       Recent Labs     12/28/24  0507 12/29/24  0350 12/30/24  0119   * 134* 141   K 3.1* 2.5* 2.5*    104 111*   CO2 22* 22* 22*   BUN 8 7* 8   CREATININE 0.8 1.0 1.3   * 122* 110   PHOS 2.0* 1.8* 2.3*   MG 1.4* 1.5* 1.6          MEWS score: 1    Rounding completed with charge PEDRO PABLO Mota reports Tmax 101.5 earlier in the day. Pt currently afebrile and all other VSS at this time. No additional concerns verbalized at this time. Instructed to call [phone] for further concerns or assistance.    Dutch Winkler RN       "

## 2024-12-31 NOTE — ASSESSMENT & PLAN NOTE
- Daughter reports that patient was saying things that did not make sense over night and this morning  - Started steroids yesterday. Suspect steroid-/medication-induced.

## 2024-12-31 NOTE — ASSESSMENT & PLAN NOTE
- Due to underlying disease and chemotherapy/transplant  - Transfuse for Hgb <7 g/dL and platelets <10k.   - Folate and copper deficient, on supplementation.   - Promacta sent in on 12/9/24. Working on aditya for financial assistance for promacta.   - Changed TMP/SMX to atovaquone.   - continue ppx antimicrobials as above  - elevated ferritin, IL2 in process for suspected HLH  - Received Rituxan on 12/29. Bone marrow biopsy completed 12/30. Results pending. Day 2 of methylpred.

## 2024-12-31 NOTE — ASSESSMENT & PLAN NOTE
- Status post treatment with azacitidine 75 mg/m2 daily x7 days plus venetoclax 100mg (voriconazole) daily x14 of 28 days.Venetoclax decreased to 7 days with cycle 3 until completion of 11 cycles.   - S/p Haplo BMT as above.   - No plans for maintenance at this time.

## 2024-12-31 NOTE — PLAN OF CARE
Problem: Adult Inpatient Plan of Care  Goal: Plan of Care Review  Outcome: Progressing     Problem: Adult Inpatient Plan of Care  Goal: Optimal Comfort and Wellbeing  Outcome: Progressing   VSS. Afebrile. Patient on room air. Denies any pain or SOB. Fluids infusing. Patient reports 1 watery bowel movement on shift. No acute changes overnight.

## 2025-01-01 LAB
ALBUMIN SERPL BCP-MCNC: 2.3 G/DL (ref 3.5–5.2)
ALP SERPL-CCNC: 49 U/L (ref 40–150)
ALT SERPL W/O P-5'-P-CCNC: 45 U/L (ref 10–44)
ANION GAP SERPL CALC-SCNC: 8 MMOL/L (ref 8–16)
ANISOCYTOSIS BLD QL SMEAR: SLIGHT
AST SERPL-CCNC: 34 U/L (ref 10–40)
BASOPHILS # BLD AUTO: 0 K/UL (ref 0–0.2)
BASOPHILS NFR BLD: 0 % (ref 0–1.9)
BILIRUB SERPL-MCNC: 0.4 MG/DL (ref 0.1–1)
BUN SERPL-MCNC: 18 MG/DL (ref 8–23)
CALCIUM SERPL-MCNC: 8.1 MG/DL (ref 8.7–10.5)
CHLORIDE SERPL-SCNC: 112 MMOL/L (ref 95–110)
CO2 SERPL-SCNC: 22 MMOL/L (ref 23–29)
CREAT SERPL-MCNC: 1.2 MG/DL (ref 0.5–1.4)
DIFFERENTIAL METHOD BLD: ABNORMAL
EOSINOPHIL # BLD AUTO: 0 K/UL (ref 0–0.5)
EOSINOPHIL NFR BLD: 0 % (ref 0–8)
ERYTHROCYTE [DISTWIDTH] IN BLOOD BY AUTOMATED COUNT: 15.2 % (ref 11.5–14.5)
EST. GFR  (NO RACE VARIABLE): >60 ML/MIN/1.73 M^2
FERRITIN SERPL-MCNC: ABNORMAL NG/ML (ref 20–300)
GLUCOSE SERPL-MCNC: 211 MG/DL (ref 70–110)
HCT VFR BLD AUTO: 24.4 % (ref 40–54)
HGB BLD-MCNC: 8.5 G/DL (ref 14–18)
IMM GRANULOCYTES # BLD AUTO: 0.01 K/UL (ref 0–0.04)
IMM GRANULOCYTES NFR BLD AUTO: 0.6 % (ref 0–0.5)
LYMPHOCYTES # BLD AUTO: 0.8 K/UL (ref 1–4.8)
LYMPHOCYTES NFR BLD: 42 % (ref 18–48)
MAGNESIUM SERPL-MCNC: 1.5 MG/DL (ref 1.6–2.6)
MCH RBC QN AUTO: 37.1 PG (ref 27–31)
MCHC RBC AUTO-ENTMCNC: 34.8 G/DL (ref 32–36)
MCV RBC AUTO: 107 FL (ref 82–98)
MONOCYTES # BLD AUTO: 0.2 K/UL (ref 0.3–1)
MONOCYTES NFR BLD: 11.6 % (ref 4–15)
NEUTROPHILS # BLD AUTO: 0.8 K/UL (ref 1.8–7.7)
NEUTROPHILS NFR BLD: 45.8 % (ref 38–73)
NRBC BLD-RTO: 0 /100 WBC
OVALOCYTES BLD QL SMEAR: ABNORMAL
PHOSPHATE SERPL-MCNC: 2.9 MG/DL (ref 2.7–4.5)
PLATELET # BLD AUTO: 26 K/UL (ref 150–450)
PLATELET BLD QL SMEAR: ABNORMAL
PMV BLD AUTO: 11.3 FL (ref 9.2–12.9)
POIKILOCYTOSIS BLD QL SMEAR: SLIGHT
POTASSIUM SERPL-SCNC: 3.6 MMOL/L (ref 3.5–5.1)
PROT SERPL-MCNC: 4.8 G/DL (ref 6–8.4)
RBC # BLD AUTO: 2.29 M/UL (ref 4.6–6.2)
SCHISTOCYTES BLD QL SMEAR: ABNORMAL
SCHISTOCYTES BLD QL SMEAR: PRESENT
SODIUM SERPL-SCNC: 142 MMOL/L (ref 136–145)
TACROLIMUS BLD-MCNC: 5.2 NG/ML (ref 5–15)
WBC # BLD AUTO: 1.81 K/UL (ref 3.9–12.7)

## 2025-01-01 PROCEDURE — 82728 ASSAY OF FERRITIN: CPT | Performed by: NURSE PRACTITIONER

## 2025-01-01 PROCEDURE — 84100 ASSAY OF PHOSPHORUS: CPT | Performed by: INTERNAL MEDICINE

## 2025-01-01 PROCEDURE — 85025 COMPLETE CBC W/AUTO DIFF WBC: CPT | Performed by: INTERNAL MEDICINE

## 2025-01-01 PROCEDURE — 63600175 PHARM REV CODE 636 W HCPCS: Performed by: NURSE PRACTITIONER

## 2025-01-01 PROCEDURE — 25000003 PHARM REV CODE 250: Performed by: NURSE PRACTITIONER

## 2025-01-01 PROCEDURE — 80197 ASSAY OF TACROLIMUS: CPT | Performed by: NURSE PRACTITIONER

## 2025-01-01 PROCEDURE — 63600175 PHARM REV CODE 636 W HCPCS: Performed by: INTERNAL MEDICINE

## 2025-01-01 PROCEDURE — 20600001 HC STEP DOWN PRIVATE ROOM

## 2025-01-01 PROCEDURE — 25000003 PHARM REV CODE 250: Performed by: INTERNAL MEDICINE

## 2025-01-01 PROCEDURE — 83735 ASSAY OF MAGNESIUM: CPT | Performed by: INTERNAL MEDICINE

## 2025-01-01 PROCEDURE — 63600175 PHARM REV CODE 636 W HCPCS: Performed by: STUDENT IN AN ORGANIZED HEALTH CARE EDUCATION/TRAINING PROGRAM

## 2025-01-01 PROCEDURE — 80053 COMPREHEN METABOLIC PANEL: CPT | Performed by: INTERNAL MEDICINE

## 2025-01-01 RX ORDER — MAGNESIUM SULFATE HEPTAHYDRATE 40 MG/ML
2 INJECTION, SOLUTION INTRAVENOUS
Status: COMPLETED | OUTPATIENT
Start: 2025-01-01 | End: 2025-01-01

## 2025-01-01 RX ORDER — TACROLIMUS 0.5 MG/1
0.5 CAPSULE ORAL EVERY MORNING
Status: DISCONTINUED | OUTPATIENT
Start: 2025-01-01 | End: 2025-01-06 | Stop reason: HOSPADM

## 2025-01-01 RX ADMIN — GABAPENTIN 600 MG: 300 CAPSULE ORAL at 09:01

## 2025-01-01 RX ADMIN — ACYCLOVIR 800 MG: 800 TABLET ORAL at 09:01

## 2025-01-01 RX ADMIN — Medication 2 MG: at 08:01

## 2025-01-01 RX ADMIN — PIPERACILLIN SODIUM AND TAZOBACTAM SODIUM 4.5 G: 4; .5 INJECTION, POWDER, LYOPHILIZED, FOR SOLUTION INTRAVENOUS at 09:01

## 2025-01-01 RX ADMIN — LOPERAMIDE HYDROCHLORIDE 2 MG: 2 CAPSULE ORAL at 08:01

## 2025-01-01 RX ADMIN — ATOVAQUONE 1500 MG: 750 SUSPENSION ORAL at 08:01

## 2025-01-01 RX ADMIN — PANTOPRAZOLE SODIUM 40 MG: 40 TABLET, DELAYED RELEASE ORAL at 08:01

## 2025-01-01 RX ADMIN — MUPIROCIN: 20 OINTMENT TOPICAL at 09:01

## 2025-01-01 RX ADMIN — ZOLPIDEM TARTRATE 5 MG: 5 TABLET, FILM COATED ORAL at 09:01

## 2025-01-01 RX ADMIN — FOLIC ACID 1 MG: 1 TABLET ORAL at 08:01

## 2025-01-01 RX ADMIN — METHYLPREDNISOLONE SODIUM SUCCINATE 100 MG: 125 INJECTION, POWDER, LYOPHILIZED, FOR SOLUTION INTRAMUSCULAR; INTRAVENOUS at 09:01

## 2025-01-01 RX ADMIN — PIPERACILLIN SODIUM AND TAZOBACTAM SODIUM 4.5 G: 4; .5 INJECTION, POWDER, LYOPHILIZED, FOR SOLUTION INTRAVENOUS at 02:01

## 2025-01-01 RX ADMIN — MUPIROCIN: 20 OINTMENT TOPICAL at 08:01

## 2025-01-01 RX ADMIN — PIPERACILLIN SODIUM AND TAZOBACTAM SODIUM 4.5 G: 4; .5 INJECTION, POWDER, LYOPHILIZED, FOR SOLUTION INTRAVENOUS at 05:01

## 2025-01-01 RX ADMIN — MAGNESIUM SULFATE HEPTAHYDRATE 2 G: 40 INJECTION, SOLUTION INTRAVENOUS at 02:01

## 2025-01-01 RX ADMIN — LOPERAMIDE HYDROCHLORIDE 2 MG: 2 CAPSULE ORAL at 09:01

## 2025-01-01 RX ADMIN — POSACONAZOLE 300 MG: 100 TABLET, DELAYED RELEASE ORAL at 08:01

## 2025-01-01 RX ADMIN — ROPINIROLE HYDROCHLORIDE 0.5 MG: 0.25 TABLET, FILM COATED ORAL at 09:01

## 2025-01-01 RX ADMIN — LOPERAMIDE HYDROCHLORIDE 2 MG: 2 CAPSULE ORAL at 12:01

## 2025-01-01 RX ADMIN — SODIUM CHLORIDE: 9 INJECTION, SOLUTION INTRAVENOUS at 03:01

## 2025-01-01 RX ADMIN — TACROLIMUS 0.5 MG: 0.5 CAPSULE ORAL at 12:01

## 2025-01-01 RX ADMIN — MAGNESIUM SULFATE HEPTAHYDRATE 2 G: 40 INJECTION, SOLUTION INTRAVENOUS at 03:01

## 2025-01-01 RX ADMIN — LOPERAMIDE HYDROCHLORIDE 2 MG: 2 CAPSULE ORAL at 04:01

## 2025-01-01 NOTE — PLAN OF CARE
VSS  No signs of evident distress or complaint of pain  Pt. Ambulating in room with one person assistance.    Right triple lumen vaughn.  Dressing CDI  Normal saline infusing at 100mL/hr  Zosyn admin q.8, possible plan to switch to PO Levaquin tomorrow.    Serum magnesium 1.5.  4g IV magnesium admin as per MAR  Family at bedside.  Very attentive to patients needs.    Bed in lowest locked position.  Call light within reach.  Instructed to call for assistance.  Pt. And family expressed understanding  Educated on plan of care.

## 2025-01-01 NOTE — PLAN OF CARE
Fevers resolved  Completing 7d course of pip-tazo for possible pneumonia/pericholecystic fluid   Ok to transition to levaquin ppx tomorrow AM if everyone agrees     ID will sign off. Please call w/ additional questions or concerns.     Lis Scott DO  Transplant Infectious Disease

## 2025-01-01 NOTE — PLAN OF CARE
No acute events this shift. Patient AAOx4. Afebrile and VSS. No complaints of pain or nausea. Standby assist. Family remains at bedside and aids patient in ambulation. IV fluids and abx continued as ordered. Bed in lowest position and locked. Side rails up x2. All possessions and call light within reach. Non-skid socks worn. Instructed to call for assistancne and voiced understanding. All needs of patient currently met. Will continue to monitor with frequent rounding.

## 2025-01-02 ENCOUNTER — TELEPHONE (OUTPATIENT)
Dept: SURGERY | Facility: CLINIC | Age: 70
End: 2025-01-02
Payer: MEDICARE

## 2025-01-02 LAB
ALBUMIN SERPL BCP-MCNC: 2.2 G/DL (ref 3.5–5.2)
ALP SERPL-CCNC: 51 U/L (ref 40–150)
ALT SERPL W/O P-5'-P-CCNC: 45 U/L (ref 10–44)
AML FISH REASON FOR REFERRAL (BM): NORMAL
ANION GAP SERPL CALC-SCNC: 8 MMOL/L (ref 8–16)
ANISOCYTOSIS BLD QL SMEAR: SLIGHT
ANNOTATION COMMENT IMP: NORMAL
AST SERPL-CCNC: 38 U/L (ref 10–40)
BACTERIA BLD CULT: NORMAL
BACTERIA BLD CULT: NORMAL
BASO STIPL BLD QL SMEAR: ABNORMAL
BASOPHILS # BLD AUTO: 0 K/UL (ref 0–0.2)
BASOPHILS NFR BLD: 0 % (ref 0–1.9)
BILIRUB SERPL-MCNC: 0.6 MG/DL (ref 0.1–1)
BUN SERPL-MCNC: 17 MG/DL (ref 8–23)
CALCIUM SERPL-MCNC: 8 MG/DL (ref 8.7–10.5)
CELLS W CYTOGENETIC ABNL BLD/T: NORMAL
CHLORIDE SERPL-SCNC: 107 MMOL/L (ref 95–110)
CHROM ANALY RESULT (ISCN): NORMAL
CLINICAL CYTOGENETICIST REVIEW: NORMAL
CMV DNA SPEC QL NAA+PROBE: NORMAL
CO2 SERPL-SCNC: 24 MMOL/L (ref 23–29)
CREAT SERPL-MCNC: 1 MG/DL (ref 0.5–1.4)
CYTOMEGALOVIRUS PCR, QUANT: NOT DETECTED IU/ML
DACRYOCYTES BLD QL SMEAR: ABNORMAL
DIFFERENTIAL METHOD BLD: ABNORMAL
EOSINOPHIL # BLD AUTO: 0 K/UL (ref 0–0.5)
EOSINOPHIL NFR BLD: 0 % (ref 0–8)
EPSTEIN-BARR VIRUS DNA: ABNORMAL
EPSTEIN-BARR VIRUS LOG (IU/ML): 4.66 LOGIU/ML
EPSTEIN-BARR VIRUS PCR, QUANT: ABNORMAL IU/ML
ERYTHROCYTE [DISTWIDTH] IN BLOOD BY AUTOMATED COUNT: 15 % (ref 11.5–14.5)
EST. GFR  (NO RACE VARIABLE): >60 ML/MIN/1.73 M^2
FERRITIN SERPL-MCNC: ABNORMAL NG/ML (ref 20–300)
GLUCOSE SERPL-MCNC: 205 MG/DL (ref 70–110)
HCT VFR BLD AUTO: 22.7 % (ref 40–54)
HGB BLD-MCNC: 8.2 G/DL (ref 14–18)
IMM GRANULOCYTES # BLD AUTO: 0.02 K/UL (ref 0–0.04)
IMM GRANULOCYTES NFR BLD AUTO: 1.2 % (ref 0–0.5)
LAB TEST METHOD: NORMAL
LYMPHOCYTES # BLD AUTO: 0.8 K/UL (ref 1–4.8)
LYMPHOCYTES NFR BLD: 45.2 % (ref 18–48)
MAGNESIUM SERPL-MCNC: 2.1 MG/DL (ref 1.6–2.6)
MCH RBC QN AUTO: 38.3 PG (ref 27–31)
MCHC RBC AUTO-ENTMCNC: 36.1 G/DL (ref 32–36)
MCV RBC AUTO: 106 FL (ref 82–98)
MOL DX INTERP BLD/T QL: NORMAL
MONOCYTES # BLD AUTO: 0.2 K/UL (ref 0.3–1)
MONOCYTES NFR BLD: 9.5 % (ref 4–15)
NEUTROPHILS # BLD AUTO: 0.7 K/UL (ref 1.8–7.7)
NEUTROPHILS NFR BLD: 44.1 % (ref 38–73)
NRBC BLD-RTO: 0 /100 WBC
OVALOCYTES BLD QL SMEAR: ABNORMAL
PHOSPHATE SERPL-MCNC: 2.6 MG/DL (ref 2.7–4.5)
PLATELET # BLD AUTO: 15 K/UL (ref 150–450)
PLATELET BLD QL SMEAR: ABNORMAL
PMV BLD AUTO: 13.5 FL (ref 9.2–12.9)
POIKILOCYTOSIS BLD QL SMEAR: SLIGHT
POLYCHROMASIA BLD QL SMEAR: ABNORMAL
POTASSIUM SERPL-SCNC: 3.2 MMOL/L (ref 3.5–5.1)
PROT SERPL-MCNC: 4.5 G/DL (ref 6–8.4)
PROVIDER SIGNING NAME: NORMAL
RBC # BLD AUTO: 2.14 M/UL (ref 4.6–6.2)
SCHISTOCYTES BLD QL SMEAR: ABNORMAL
SCHISTOCYTES BLD QL SMEAR: PRESENT
SODIUM SERPL-SCNC: 139 MMOL/L (ref 136–145)
SPECIMEN SOURCE: NORMAL
SPHEROCYTES BLD QL SMEAR: ABNORMAL
TEST PERFORMANCE INFO SPEC: NORMAL
WBC # BLD AUTO: 1.68 K/UL (ref 3.9–12.7)

## 2025-01-02 PROCEDURE — 84100 ASSAY OF PHOSPHORUS: CPT | Performed by: INTERNAL MEDICINE

## 2025-01-02 PROCEDURE — 97110 THERAPEUTIC EXERCISES: CPT

## 2025-01-02 PROCEDURE — 99233 SBSQ HOSP IP/OBS HIGH 50: CPT | Mod: FS,,, | Performed by: INTERNAL MEDICINE

## 2025-01-02 PROCEDURE — 85025 COMPLETE CBC W/AUTO DIFF WBC: CPT | Performed by: INTERNAL MEDICINE

## 2025-01-02 PROCEDURE — 63600175 PHARM REV CODE 636 W HCPCS: Performed by: NURSE PRACTITIONER

## 2025-01-02 PROCEDURE — 97530 THERAPEUTIC ACTIVITIES: CPT

## 2025-01-02 PROCEDURE — 25000003 PHARM REV CODE 250: Performed by: NURSE PRACTITIONER

## 2025-01-02 PROCEDURE — 87799 DETECT AGENT NOS DNA QUANT: CPT | Performed by: NURSE PRACTITIONER

## 2025-01-02 PROCEDURE — 80053 COMPREHEN METABOLIC PANEL: CPT | Performed by: INTERNAL MEDICINE

## 2025-01-02 PROCEDURE — 83735 ASSAY OF MAGNESIUM: CPT | Performed by: INTERNAL MEDICINE

## 2025-01-02 PROCEDURE — 20600001 HC STEP DOWN PRIVATE ROOM

## 2025-01-02 PROCEDURE — 82728 ASSAY OF FERRITIN: CPT | Performed by: NURSE PRACTITIONER

## 2025-01-02 PROCEDURE — 25000003 PHARM REV CODE 250: Performed by: INTERNAL MEDICINE

## 2025-01-02 PROCEDURE — 63600175 PHARM REV CODE 636 W HCPCS: Performed by: INTERNAL MEDICINE

## 2025-01-02 RX ORDER — PREDNISONE 20 MG/1
80 TABLET ORAL DAILY
Status: DISCONTINUED | OUTPATIENT
Start: 2025-01-03 | End: 2025-01-03

## 2025-01-02 RX ORDER — LEVOFLOXACIN 500 MG/1
500 TABLET, FILM COATED ORAL DAILY
Status: DISCONTINUED | OUTPATIENT
Start: 2025-01-02 | End: 2025-01-06 | Stop reason: HOSPADM

## 2025-01-02 RX ORDER — PREDNISONE 5 MG/1
5 TABLET ORAL EVERY OTHER DAY
Status: DISCONTINUED | OUTPATIENT
Start: 2025-02-14 | End: 2025-01-03

## 2025-01-02 RX ORDER — PREDNISONE 20 MG/1
20 TABLET ORAL DAILY
Status: DISCONTINUED | OUTPATIENT
Start: 2025-01-24 | End: 2025-01-03

## 2025-01-02 RX ORDER — PREDNISONE 20 MG/1
60 TABLET ORAL DAILY
Status: DISCONTINUED | OUTPATIENT
Start: 2025-01-10 | End: 2025-01-03

## 2025-01-02 RX ORDER — PREDNISONE 20 MG/1
40 TABLET ORAL DAILY
Status: DISCONTINUED | OUTPATIENT
Start: 2025-01-17 | End: 2025-01-03

## 2025-01-02 RX ORDER — PREDNISONE 5 MG/1
5 TABLET ORAL DAILY
Status: DISCONTINUED | OUTPATIENT
Start: 2025-02-07 | End: 2025-01-03

## 2025-01-02 RX ORDER — PREDNISONE 10 MG/1
10 TABLET ORAL DAILY
Status: DISCONTINUED | OUTPATIENT
Start: 2025-01-31 | End: 2025-01-03

## 2025-01-02 RX ADMIN — LEVOFLOXACIN 500 MG: 500 TABLET, FILM COATED ORAL at 08:01

## 2025-01-02 RX ADMIN — ATOVAQUONE 1500 MG: 750 SUSPENSION ORAL at 08:01

## 2025-01-02 RX ADMIN — Medication 2 MG: at 08:01

## 2025-01-02 RX ADMIN — LOPERAMIDE HYDROCHLORIDE 2 MG: 2 CAPSULE ORAL at 12:01

## 2025-01-02 RX ADMIN — ACYCLOVIR 800 MG: 800 TABLET ORAL at 09:01

## 2025-01-02 RX ADMIN — LOPERAMIDE HYDROCHLORIDE 2 MG: 2 CAPSULE ORAL at 09:01

## 2025-01-02 RX ADMIN — LOPERAMIDE HYDROCHLORIDE 2 MG: 2 CAPSULE ORAL at 08:01

## 2025-01-02 RX ADMIN — ZOLPIDEM TARTRATE 5 MG: 5 TABLET, FILM COATED ORAL at 09:01

## 2025-01-02 RX ADMIN — POTASSIUM CHLORIDE 20 MEQ: 1500 TABLET, EXTENDED RELEASE ORAL at 12:01

## 2025-01-02 RX ADMIN — SODIUM CHLORIDE: 9 INJECTION, SOLUTION INTRAVENOUS at 02:01

## 2025-01-02 RX ADMIN — POTASSIUM CHLORIDE 20 MEQ: 1500 TABLET, EXTENDED RELEASE ORAL at 10:01

## 2025-01-02 RX ADMIN — Medication 1 TABLET: at 04:01

## 2025-01-02 RX ADMIN — METHYLPREDNISOLONE SODIUM SUCCINATE 100 MG: 125 INJECTION, POWDER, LYOPHILIZED, FOR SOLUTION INTRAMUSCULAR; INTRAVENOUS at 09:01

## 2025-01-02 RX ADMIN — POSACONAZOLE 300 MG: 100 TABLET, DELAYED RELEASE ORAL at 08:01

## 2025-01-02 RX ADMIN — GABAPENTIN 600 MG: 300 CAPSULE ORAL at 09:01

## 2025-01-02 RX ADMIN — Medication 1 TABLET: at 08:01

## 2025-01-02 RX ADMIN — FOLIC ACID 1 MG: 1 TABLET ORAL at 08:01

## 2025-01-02 RX ADMIN — POTASSIUM CHLORIDE 20 MEQ: 1500 TABLET, EXTENDED RELEASE ORAL at 08:01

## 2025-01-02 RX ADMIN — Medication 1 TABLET: at 09:01

## 2025-01-02 RX ADMIN — ACYCLOVIR 800 MG: 800 TABLET ORAL at 08:01

## 2025-01-02 RX ADMIN — TACROLIMUS 0.5 MG: 0.5 CAPSULE ORAL at 08:01

## 2025-01-02 RX ADMIN — LOPERAMIDE HYDROCHLORIDE 2 MG: 2 CAPSULE ORAL at 04:01

## 2025-01-02 RX ADMIN — Medication 1 TABLET: at 12:01

## 2025-01-02 RX ADMIN — PANTOPRAZOLE SODIUM 40 MG: 40 TABLET, DELAYED RELEASE ORAL at 08:01

## 2025-01-02 RX ADMIN — PIPERACILLIN SODIUM AND TAZOBACTAM SODIUM 4.5 G: 4; .5 INJECTION, POWDER, LYOPHILIZED, FOR SOLUTION INTRAVENOUS at 05:01

## 2025-01-02 NOTE — ASSESSMENT & PLAN NOTE
- GVHD prophylaxis with Post-transplant cyclophosphamide, Tacrolimus, MMF (MMF d/c on D+35).   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 He started budesonide 3mg TID on 10/28/24. Due to persistent nausea despite budesonide so started systemic steroids 11/1 at 0.5 mg/kg and his steroid completed 12/8/24.  - see tacro dosing under allogeneic BMT  - diarrhea improved, GI consulted for flex sig and EGD however will defer for now given improvement in diarrhea with steroids and ATC antidiarrheals. GI has signed off.   - methylpred 2mg/kg/day started 12/30. Today is day 4, will start oral taper tomorrow

## 2025-01-02 NOTE — PLAN OF CARE
POC reviewed with patient; understanding verbalized. Haplo Allo Sct Day +105. Zosyn d/c'd this shift. Pt to taper down to PO steroids starting tmr AM. NS @ 100 cc/hr. 1x watery Bm this AM; continuing to give scheduled loperamide as ordered. Family to remain at bedside. PO electrolyte replaced this shift. Pt. with nonskid footwear on, bed in lowest position, and locked with bed rails up x2.  Pt. instructed to call prior to getting OOB.  Pt. has call light and personal items within reach. Patient ambulates in room independently. VSS and afebrile this shift. All questions and concerns addressed at this time.

## 2025-01-02 NOTE — ASSESSMENT & PLAN NOTE
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

## 2025-01-02 NOTE — TREATMENT PLAN
GI Treatment Plan      GI consulted for EGD/flex sig with biopsies to rule out GVHD given patients naueaa and diarrhea on presentation. Patients symptoms have resolved. Spoke to primary team who no longer thinks endoscopy with biopsies is warranted at this time.      GI will sign off.    Tanika Knapp MD  Gastroenterology and Hepatology Fellow, PGY-4

## 2025-01-02 NOTE — ASSESSMENT & PLAN NOTE
- Pancytopenia in the setting of uptrending EBV. Last EBV level from 12/26 was 24K. Trending weekly.  - Ferritin stable at 26K today. Continue daily levels.  - IL 2 elevated at 2952  - Received Rituxan on 12/29  - Bone marrow biopsy completed 12/30, results pending   - IV Methylpred 2mg/kg/day started 12/30. Today is day 4, will start oral taper tomorrow.

## 2025-01-02 NOTE — ASSESSMENT & PLAN NOTE
- afebrile since 12/30  - blood cultures x2 NGTD  - UA negative  - RIP/covid negative  - CXR with chronic LLL infiltrate; unable to determine severity  - CT c/a/p with new LLL consolidation, presumed infectious; trace pericholecystic fluid- edema vs. Cholecystitis  - given zosyn and vanc x1 in ED;   - Day 7 zosyn, will stop today and transition to ppx Levaquin  - Stopped IV and po Vanc 12/30  - Fever possible from HLH. Steroids started 12/30.

## 2025-01-02 NOTE — SUBJECTIVE & OBJECTIVE
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  Afebrile since 12/30. ID has signed off. Completing Zosyn and will transition to ppx Levaquin. 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 Awaiting BM bx results. Reports no diarrhea since yesterday. Will start oral steroid taper. GI has signed off.     Objective:     Vital Signs (Most Recent):  Temp: 97.5 °F (36.4 °C) (01/02/25 1046)  Pulse: 69 (01/02/25 1046)  Resp: 18 (01/02/25 1046)  BP: 108/73 (01/02/25 1046)  SpO2: 99 % (01/02/25 1046) Vital Signs (24h Range):  Temp:  [97.3 °F (36.3 °C)-98.9 °F (37.2 °C)] 97.5 °F (36.4 °C)  Pulse:  [65-77] 69  Resp:  [16-18] 18  SpO2:  [91 %-99 %] 99 %  BP: (106-165)/(64-90) 108/73     Weight: 63 kg (138 lb 14.2 oz)  Body mass index is 20.51 kg/m².  Body surface area is 1.75 meters squared.      Intake/Output - Last 3 Shifts         12/31 0700  01/01 0659 01/01 0700  01/02 0659 01/02 0700  01/03 0659    P.O. 600 480 450    I.V. (mL/kg)  3308.6 (52.5)     Blood 1186.8      IV Piggyback   0    Total Intake(mL/kg) 1786.8 (28.4) 3788.6 (60.1) 450 (7.1)    Urine (mL/kg/hr)  300 (0.2) 200 (0.6)    Stool       Total Output  300 200    Net +1786.8 +3488.6 +250           Urine Occurrence 1 x 6 x     Stool Occurrence 1 x      Emesis Occurrence 0 x               Physical Exam  Vitals and nursing note reviewed.   Constitutional:       Appearance: Normal appearance. He is well-developed.   HENT:      Head: Normocephalic and atraumatic.      Right Ear: External ear normal.      Left Ear: External ear normal.      Mouth/Throat:      Mouth: Mucous membranes are dry.      Pharynx: No oropharyngeal exudate.   Eyes:      Extraocular Movements: Extraocular movements intact.      Conjunctiva/sclera: Conjunctivae normal.   Cardiovascular:      Rate and Rhythm: Normal rate and regular rhythm.      Pulses: Normal pulses.      Heart sounds: Normal heart sounds. No murmur  heard.  Pulmonary:      Effort: Pulmonary effort is normal. No respiratory distress.      Breath sounds: Normal breath sounds. No stridor. No wheezing or rales.   Abdominal:      General: Bowel sounds are normal.      Palpations: Abdomen is soft.      Tenderness: There is no abdominal tenderness.   Musculoskeletal:         General: Normal range of motion.      Cervical back: Normal range of motion and neck supple.      Right lower leg: No edema.      Left lower leg: No edema.   Skin:     General: Skin is warm and dry.      Findings: Bruising present. No erythema or rash.      Comments: Right triple lumen vaughn clean and dry with no signs of infection   Neurological:      General: No focal deficit present.      Mental Status: He is alert and oriented to person, place, and time.      Motor: Weakness present.   Psychiatric:         Mood and Affect: Mood normal.         Behavior: Behavior normal.         Thought Content: Thought content normal.         Judgment: Judgment normal.            Significant Labs:   CBC:   Recent Labs   Lab 01/01/25  0448 01/02/25  0511   WBC 1.81* 1.68*   HGB 8.5* 8.2*   HCT 24.4* 22.7*   PLT 26* 15*    and CMP:   Recent Labs   Lab 01/01/25  0448 01/02/25  0511    139   K 3.6 3.2*   * 107   CO2 22* 24   * 205*   BUN 18 17   CREATININE 1.2 1.0   CALCIUM 8.1* 8.0*   PROT 4.8* 4.5*   ALBUMIN 2.3* 2.2*   BILITOT 0.4 0.6   ALKPHOS 49 51   AST 34 38   ALT 45* 45*   ANIONGAP 8 8       Diagnostic Results:  None

## 2025-01-02 NOTE — PROGRESS NOTES
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  Afebrile since 12/30. ID has signed off. Completing Zosyn and will transition to ppx Levaquin. 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 Awaiting BM bx results. Reports no diarrhea since yesterday. Will start oral steroid taper. GI has signed off.     Objective:     Vital Signs (Most Recent):  Temp: 97.5 °F (36.4 °C) (01/02/25 1046)  Pulse: 69 (01/02/25 1046)  Resp: 18 (01/02/25 1046)  BP: 108/73 (01/02/25 1046)  SpO2: 99 % (01/02/25 1046) Vital Signs (24h Range):  Temp:  [97.3 °F (36.3 °C)-98.9 °F (37.2 °C)] 97.5 °F (36.4 °C)  Pulse:  [65-77] 69  Resp:  [16-18] 18  SpO2:  [91 %-99 %] 99 %  BP: (106-165)/(64-90) 108/73     Weight: 63 kg (138 lb 14.2 oz)  Body mass index is 20.51 kg/m².  Body surface area is 1.75 meters squared.      Intake/Output - Last 3 Shifts         12/31 0700  01/01 0659 01/01 0700  01/02 0659 01/02 0700  01/03 0659    P.O. 600 480 450    I.V. (mL/kg)  3308.6 (52.5)     Blood 1186.8      IV Piggyback   0    Total Intake(mL/kg) 1786.8 (28.4) 3788.6 (60.1) 450 (7.1)    Urine (mL/kg/hr)  300 (0.2) 200 (0.6)    Stool       Total Output  300 200    Net +1786.8 +3488.6 +250           Urine Occurrence 1 x 6 x     Stool Occurrence 1 x      Emesis Occurrence 0 x               Physical Exam  Vitals and nursing note reviewed.   Constitutional:       Appearance: Normal appearance. He is well-developed.   HENT:      Head: Normocephalic and atraumatic.      Right Ear: External ear normal.      Left Ear: External ear normal.      Mouth/Throat:      Mouth: Mucous membranes are dry.      Pharynx: No oropharyngeal exudate.   Eyes:      Extraocular Movements: Extraocular movements  intact.      Conjunctiva/sclera: Conjunctivae normal.   Cardiovascular:      Rate and Rhythm: Normal rate and regular rhythm.      Pulses: Normal pulses.      Heart sounds: Normal heart sounds. No murmur heard.  Pulmonary:      Effort: Pulmonary effort is normal. No respiratory distress.      Breath sounds: Normal breath sounds. No stridor. No wheezing or rales.   Abdominal:      General: Bowel sounds are normal.      Palpations: Abdomen is soft.      Tenderness: There is no abdominal tenderness.   Musculoskeletal:         General: Normal range of motion.      Cervical back: Normal range of motion and neck supple.      Right lower leg: No edema.      Left lower leg: No edema.   Skin:     General: Skin is warm and dry.      Findings: Bruising present. No erythema or rash.      Comments: Right triple lumen vaughn clean and dry with no signs of infection   Neurological:      General: No focal deficit present.      Mental Status: He is alert and oriented to person, place, and time.      Motor: Weakness present.   Psychiatric:         Mood and Affect: Mood normal.         Behavior: Behavior normal.         Thought Content: Thought content normal.         Judgment: Judgment normal.            Significant Labs:   CBC:   Recent Labs   Lab 01/01/25  0448 01/02/25  0511   WBC 1.81* 1.68*   HGB 8.5* 8.2*   HCT 24.4* 22.7*   PLT 26* 15*    and CMP:   Recent Labs   Lab 01/01/25  0448 01/02/25  0511    139   K 3.6 3.2*   * 107   CO2 22* 24   * 205*   BUN 18 17   CREATININE 1.2 1.0   CALCIUM 8.1* 8.0*   PROT 4.8* 4.5*   ALBUMIN 2.3* 2.2*   BILITOT 0.4 0.6   ALKPHOS 49 51   AST 34 38   ALT 45* 45*   ANIONGAP 8 8       Diagnostic Results:  None  Assessment/Plan:     * Neutropenic fever  - afebrile since 12/30  - blood cultures x2 NGTD  - UA negative  - RIP/covid negative  - CXR with chronic LLL infiltrate; unable to determine severity  - CT c/a/p with new LLL consolidation, presumed infectious; trace  pericholecystic fluid- edema vs. Cholecystitis  - given zosyn and vanc x1 in ED;   - Day 7 zosyn, will stop today and transition to ppx Levaquin  - Stopped IV and po Vanc 12/30  - Fever possible from HLH. Steroids started 12/30.    Pancytopenia  - Due to underlying disease and chemotherapy/transplant  - Transfuse for Hgb <7 g/dL and platelets <10k.   - Folate and copper deficient, on supplementation.   - Promacta sent in on 12/9/24. [employer] approved for financial assistance.   - Changed TMP/SMX to atovaquone.   - continue ppx antimicrobials as above  - elevated ferritin, elevated IL2 highly suspicious for HLH, BM bx results pending   - Received Rituxan on 12/29. Day 4 of methylpred.    HLH (hemophagocytic lymphohistiocytosis)  - Pancytopenia in the setting of uptrending EBV. Last EBV level from 12/26 was 24K. Trending weekly.  - Ferritin stable at 26K today. Continue daily levels.  - IL 2 elevated at 2952  - Received Rituxan on 12/29  - Bone marrow biopsy completed 12/30, results pending   - IV Methylpred 2mg/kg/day started 12/30. Today is day 4, will start oral taper tomorrow.    S/P allogeneic bone marrow transplant  Status post haploidentical stem cell transplantation conditioned with FluMel 100 + PTCy+ 2Gy TBI . Currently Day+ 105. Engrafted on 10/09/24 day +20.  Day 30 bone marrow (11/5/2024) showing mildly hypercellular marrow with no increased blast (0.3%) and no evidence of myeloid neoplasm.   Day 100 bone marrow biopsy completed 12/30/24  Plan for central line removal in January with gen surg     - continue ppx acyclovir, posaconazole, and atovaquone  - level 1/1 was 5.2; continue tacro 0.5 mg every other day (12/30)   - started 2mg/kg/day methylpred on 12/30 for possible GVHD of the GI tract and GI consulted for flex sig and EGD. Day 4 of methylpred. Diarrhea resolved, deferring flex sig and EGD, GI has signed off.  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 He started budesonide 3mg TID on 10/28/24. Due to persistent nausea despite budesonide so started systemic steroids 11/1 at 0.5 mg/kg and his steroid completed 12/8/24.  - see tacro dosing under allogeneic BMT  - diarrhea improved, GI consulted for flex sig and EGD however will defer for now given improvement in diarrhea with steroids and ATC antidiarrheals. GI has signed off.   - methylpred 2mg/kg/day started 12/30. Today is day 4, will start oral taper tomorrow    Myelodysplastic syndrome  - Status post treatment with azacitidine 75 mg/m2 daily x7 days plus venetoclax 100mg (voriconazole) daily x14 of 28 days.Venetoclax decreased to 7 days with cycle 3 until completion of 11 cycles.   - S/p Haplo BMT as above.   - No plans for maintenance at this time.     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  - Repleting via PRN electrolyte protocol    Hypophosphatemia  - daily phos level  - replace per PRN electrolyte protocol    Hypotension  - Etiology of hypotension is likely decreased oral intake, diarrhea, and fevers  - Improved with continuous IVF  RESOLVED    Hypertension, essential  - Holding home antihypertensives given hypotension on presentation    Physical debility  - PT/OT consulted on admit,  recommending  PT/OT    RLS (restless legs syndrome)  - Continue home ropinirole     Insomnia  - Continue home Ambien    ADDISON (acute kidney injury)  - Likely prerenal due to decreased PO intake. Received 2L IVF in ED; on continuous IVF  RESOLVED    Altered mental status  - Suspect steroid-/medication-induced.  RESOLVED        VTE Risk Mitigation (From admission, onward)           Ordered     heparin, porcine (PF) 100 unit/mL injection flush 500 Units  As needed (PRN)         12/29/24 1443     Reason for No Pharmacological VTE Prophylaxis  Once        Question:  Reasons:  Answer:  Thrombocytopenia    12/26/24 1330     IP VTE HIGH RISK PATIENT  Once         12/26/24 1330     Place sequential compression device  Until discontinued         12/26/24 1330                    Disposition: inpatient     Meg Palacios NP  Bone Marrow Transplant  Janusz Ma - Oncology ([facility])

## 2025-01-02 NOTE — ASSESSMENT & PLAN NOTE
- Etiology of hypotension is likely decreased oral intake, diarrhea, and fevers  - Improved with continuous IVF  RESOLVED

## 2025-01-02 NOTE — TELEPHONE ENCOUNTER
With help of Justyn, attempted to reach pt by phone. No answer. Left another voice message offering pt dates of 01/7/25 140 or 1/14/25 @0900 or 1100 for port removal. Awaiting pt response. Several attempts have been made to schedule pt by [facility] Nurses.

## 2025-01-02 NOTE — PT/OT/SLP PROGRESS
"Physical Therapy Treatment    Patient Name:  Guillaume Salinas   MRN:  [id number]    Recommendations:     Discharge Recommendations: Low Intensity Therapy  Discharge Equipment Recommendations: walker, rolling  Barriers to discharge: None    Assessment:     Guillaume Salinas is a 69 y.o. male admitted with a medical diagnosis of Neutropenic fever.  He presents with the following impairments/functional limitations: weakness, impaired endurance Patient with good participation this date, progressing with seated therapeutic exercise and hallway ambulation. No pain reported, although increased fatigue following mobility. Politely declined use of  and comfortable with wife interpreting instead. Mild dizziness reported upon first STS trial that resolved with sips of water and seated rest break (BP appropriate, taken before/after ambulatory trial). Patient will continue to benefit from skilled PT during this admit to address BLE strength and endurance deficits, and maximize independence with functional mobility.    Rehab Prognosis: Good; patient would benefit from acute skilled PT services to address these deficits and reach maximum level of function.    Recent Surgery: * No surgery found *      Plan:     During this hospitalization, patient to be seen 2 x/week to address the identified rehab impairments via gait training, therapeutic activities, neuromuscular re-education, therapeutic exercises and progress toward the following goals:    Plan of Care Expires:  01/28/25    Subjective     Chief Complaint: "I'm just a little tired"  Patient/Family Comments/goals: return home to [facility]  Pain/Comfort:  Pain Rating 1: 0/10  Pain Rating Post-Intervention 1: 0/10      Objective:     Communicated with RN prior to session.  Patient found up in chair with  (no active lines) upon PT entry to room.     General Precautions: Standard, fall  Orthopedic Precautions: N/A  Braces: N/A  Respiratory Status: Room air   "   Functional Mobility:  Transfers:     Sit to Stand:  stand by assistance with rolling walker and from EOB and chair  Gait: 88 ft with RW and SBA, light cues for direction, no LOB, FFP with cues for upright and forward gaze  Balance:   Sitting: good  Standing: SBA + BUE support on RW for safety      AM-PAC 6 CLICK MOBILITY  Turning over in bed (including adjusting bedclothes, sheets and blankets)?: 4  Sitting down on and standing up from a chair with arms (e.g., wheelchair, bedside commode, etc.): 4  Moving from lying on back to sitting on the side of the bed?: 4  Moving to and from a bed to a chair (including a wheelchair)?: 4  Need to walk in hospital room?: 4  Climbing 3-5 steps with a railing?: 4  Basic Mobility Total Score: 24       Treatment & Education:  Patient educated on importance of OOB activity to promote overall endurance.  Education on energy conservation, safety in home environment, appropriate AD usage.  Patient educated on calling for assistance for any needs to improve overall safety awareness.  All items placed within reach, and notified on call light usage for assistance with any needs for fall prevention.  Patient educated on current level of function and progression towards therapeutic goals.  X20 LAQ, seated march, adductor squeezes, scap squeeze, shoulder rolls    Patient left up in chair with all lines intact, call button in reach, and PCT + wife present..    GOALS:   Multidisciplinary Problems       Physical Therapy Goals          Problem: Physical Therapy    Goal Priority Disciplines Outcome Interventions   Physical Therapy Goal     PT, PT/OT Progressing    Description: Goals to be met by: 2025     Patient will increase functional independence with mobility by performin. Sit to stand transfer with [place]  2. Gait  x 250 feet with [place] using No Assistive Device.   3. Lower extremity exercise program x10 reps , with independence                         Time  Tracking:     PT Received On: 01/02/25  PT Start Time: 1144     PT Stop Time: 1202  PT Total Time (min): 18 min     Billable Minutes: Therapeutic Exercise 18             Number of PTA visits since last PT visit: 0     01/02/2025

## 2025-01-02 NOTE — ASSESSMENT & PLAN NOTE
- Due to underlying disease and chemotherapy/transplant  - Transfuse for Hgb <7 g/dL and platelets <10k.   - Folate and copper deficient, on supplementation.   - Promacta sent in on 12/9/24. [employer] approved for financial assistance.   - Changed TMP/SMX to atovaquone.   - continue ppx antimicrobials as above  - elevated ferritin, elevated IL2 highly suspicious for HLH, BM bx results pending   - Received Rituxan on 12/29. Day 4 of methylpred.   22

## 2025-01-02 NOTE — PROGRESS NOTES
Pt and spouse seen for follow up. Feeling well, with improved sleep and no further GI issues.  Recs for resumed home health with [facility] and RW.  No other needs identified at this time. Will continue to follow and assist as needed.

## 2025-01-02 NOTE — PT/OT/SLP PROGRESS
"Occupational Therapy   Treatment    Name: Guillamue Salinas  MRN: [id number]  Admitting Diagnosis:  Neutropenic fever       Recommendations:     Discharge Recommendations: Low Intensity Therapy  Discharge Equipment Recommendations:  walker, rolling  Barriers to discharge:  None    Assessment:     Guillaume Salinas is a 69 y.o. male with a medical diagnosis of Neutropenic fever.  He presents with mildly decreased self-care and functional mobility independence 2/2 AMS. Performance deficits affecting function are weakness, gait instability, impaired self care skills, impaired balance, impaired endurance. Pt with good motivation to increase functional independence.  Patient continues to demonstrate the need for low intensity therapy on a scheduled basis exhibited by decreased independence with self-care and functional mobility    Digital  utilized: Erika #[id number]    Rehab Prognosis:  Good; patient would benefit from acute skilled OT services to address these deficits and reach maximum level of function.       Plan:     Patient to be seen 3 x/week to address the above listed problems via self-care/home management, therapeutic activities, therapeutic exercises  Plan of Care Expires: 01/27/25  Plan of Care Reviewed with: patient, spouse    Subjective     Chief Complaint: "my arms feel weak"  Patient/Family Comments/goals: increase independence  Pain/Comfort:  Pain Rating 1: 0/10    Objective:     Communicated with: nursing prior to session.  Patient found HOB elevated with telemetry upon OT entry to room.    General Precautions: Standard, fall    Orthopedic Precautions:N/A  Braces: N/A  Respiratory Status: Room air     Occupational Performance:     Bed Mobility:    Patient completed Scooting/Bridging with stand by assistance  Patient completed Supine to Sit with stand by assistance \  Pt seated EOB with SBA    Functional Mobility/Transfers:  Patient completed Sit <> Stand Transfer with stand by " assistance  with  rolling walker   Patient completed Bed <> Chair Transfer using Step Transfer technique with stand by assistance with rolling walker  Functional Mobility: Pt engaged in functional mobility to simulate household/community distances in [place] with CGA and RW in order to maximize functional endurance and standing balance required for engagement in occupations of choice - pt with decreased gait speed and step length, though with appropriate stability and no LOB      Activities of Daily Living:  Lower Body Dressing: stand by assistance to josep footwear  Pt reporting no further ADL needs    Therapeutic exercise:   - Pt completed the following BUE exercises to increase BUE strength for self-care: shoulder flexion, chest press, elbow flexion/extension x10 ea    [facility] 6 Click ADL: 23    Treatment & Education:  Session this date targeted therapeutic activities and therapeutic exercises to increase pt's independence  Pt and family member educated on OT roles, POC, call button  for assistance    Patient left up in chair with all lines intact, call button in reach, nursing notified, and spouse present    GOALS:   Multidisciplinary Problems       Occupational Therapy Goals          Problem: Occupational Therapy    Goal Priority Disciplines Outcome Interventions   Occupational Therapy Goal     OT, PT/OT Progressing    Description: Goals to be met by: 1/10/2025     Patient will increase functional independence with ADLs by performing:    UE Dressing with Supervision.  LE Dressing with Supervision.  Grooming while standing at sink with Supervision.  Toileting from toilet with Supervision for hygiene and clothing management. -MET 12/30/2024  Toilet transfer to toilet with Supervision.                         Time Tracking:     OT Date of Treatment: 01/02/25  OT Start Time: 1320  OT Stop Time: 1340  OT Total Time (min): 20 min    Billable Minutes:Therapeutic Activity 20    OT/DIMITRIS: OT          1/2/2025

## 2025-01-02 NOTE — ASSESSMENT & PLAN NOTE
Status post haploidentical stem cell transplantation conditioned with FluMel 100 + PTCy+ 2Gy TBI . Currently Day+ 105. Engrafted on 10/09/24 day +20.  Day 30 bone marrow (11/5/2024) showing mildly hypercellular marrow with no increased blast (0.3%) and no evidence of myeloid neoplasm.   Day 100 bone marrow biopsy completed 12/30/24  Plan for central line removal in January with gen surg     - continue ppx acyclovir, posaconazole, and atovaquone  - level 1/1 was 5.2; continue tacro 0.5 mg every other day (12/30)   - started 2mg/kg/day methylpred on 12/30 for possible GVHD of the GI tract and GI consulted for flex sig and EGD. Day 4 of methylpred. Diarrhea resolved, deferring flex sig and EGD, GI has signed off.  - daily acute and chronic (starting 21/28 at Day +100) GVHD charting while inpatient  - monitor weekly EBV/CMV; last EBV on 12/26 is 23032, EBV drawn today and pending; last CMV same date undetected  - continue cmv ppx with letermovir (patient currently does not have, medication has been shipped)

## 2025-01-03 LAB
ALBUMIN SERPL BCP-MCNC: 2.4 G/DL (ref 3.5–5.2)
ALP SERPL-CCNC: 58 U/L (ref 40–150)
ALT SERPL W/O P-5'-P-CCNC: 63 U/L (ref 10–44)
ANION GAP SERPL CALC-SCNC: 6 MMOL/L (ref 8–16)
ANISOCYTOSIS BLD QL SMEAR: SLIGHT
AST SERPL-CCNC: 52 U/L (ref 10–40)
BASOPHILS # BLD AUTO: 0 K/UL (ref 0–0.2)
BASOPHILS NFR BLD: 0 % (ref 0–1.9)
BILIRUB SERPL-MCNC: 0.8 MG/DL (ref 0.1–1)
BUN SERPL-MCNC: 17 MG/DL (ref 8–23)
CALCIUM SERPL-MCNC: 7.8 MG/DL (ref 8.7–10.5)
CHLORIDE SERPL-SCNC: 105 MMOL/L (ref 95–110)
CO2 SERPL-SCNC: 28 MMOL/L (ref 23–29)
CREAT SERPL-MCNC: 0.8 MG/DL (ref 0.5–1.4)
DIFFERENTIAL METHOD BLD: ABNORMAL
DNA/RNA EXTRACT AND HOLD RESULT: NORMAL
DNA/RNA EXTRACTION: NORMAL
EOSINOPHIL # BLD AUTO: 0 K/UL (ref 0–0.5)
EOSINOPHIL NFR BLD: 0 % (ref 0–8)
ERYTHROCYTE [DISTWIDTH] IN BLOOD BY AUTOMATED COUNT: 15.1 % (ref 11.5–14.5)
EST. GFR  (NO RACE VARIABLE): >60 ML/MIN/1.73 M^2
EXHR SPECIMEN TYPE: NORMAL
FERRITIN SERPL-MCNC: ABNORMAL NG/ML (ref 20–300)
GLUCOSE SERPL-MCNC: 195 MG/DL (ref 70–110)
HCT VFR BLD AUTO: 22.9 % (ref 40–54)
HGB BLD-MCNC: 8.1 G/DL (ref 14–18)
HYPOCHROMIA BLD QL SMEAR: ABNORMAL
IMM GRANULOCYTES # BLD AUTO: 0.03 K/UL (ref 0–0.04)
IMM GRANULOCYTES NFR BLD AUTO: 1.7 % (ref 0–0.5)
LYMPHOCYTES # BLD AUTO: 0.8 K/UL (ref 1–4.8)
LYMPHOCYTES NFR BLD: 43.6 % (ref 18–48)
MAGNESIUM SERPL-MCNC: 1.6 MG/DL (ref 1.6–2.6)
MCH RBC QN AUTO: 37.5 PG (ref 27–31)
MCHC RBC AUTO-ENTMCNC: 35.4 G/DL (ref 32–36)
MCV RBC AUTO: 106 FL (ref 82–98)
MONOCYTES # BLD AUTO: 0.2 K/UL (ref 0.3–1)
MONOCYTES NFR BLD: 8.9 % (ref 4–15)
NEUTROPHILS # BLD AUTO: 0.8 K/UL (ref 1.8–7.7)
NEUTROPHILS NFR BLD: 45.8 % (ref 38–73)
NRBC BLD-RTO: 2 /100 WBC
PHOSPHATE SERPL-MCNC: 2.9 MG/DL (ref 2.7–4.5)
PLATELET # BLD AUTO: 13 K/UL (ref 150–450)
PLATELET BLD QL SMEAR: ABNORMAL
PMV BLD AUTO: ABNORMAL FL (ref 9.2–12.9)
POIKILOCYTOSIS BLD QL SMEAR: SLIGHT
POLYCHROMASIA BLD QL SMEAR: ABNORMAL
POTASSIUM SERPL-SCNC: 3.5 MMOL/L (ref 3.5–5.1)
PROT SERPL-MCNC: 4.8 G/DL (ref 6–8.4)
RBC # BLD AUTO: 2.16 M/UL (ref 4.6–6.2)
SODIUM SERPL-SCNC: 139 MMOL/L (ref 136–145)
TACROLIMUS BLD-MCNC: 5.4 NG/ML (ref 5–15)
WBC # BLD AUTO: 1.79 K/UL (ref 3.9–12.7)

## 2025-01-03 PROCEDURE — 63600175 PHARM REV CODE 636 W HCPCS: Performed by: INTERNAL MEDICINE

## 2025-01-03 PROCEDURE — 82728 ASSAY OF FERRITIN: CPT | Performed by: NURSE PRACTITIONER

## 2025-01-03 PROCEDURE — 84100 ASSAY OF PHOSPHORUS: CPT | Performed by: INTERNAL MEDICINE

## 2025-01-03 PROCEDURE — 25000003 PHARM REV CODE 250: Performed by: INTERNAL MEDICINE

## 2025-01-03 PROCEDURE — 80197 ASSAY OF TACROLIMUS: CPT | Performed by: NURSE PRACTITIONER

## 2025-01-03 PROCEDURE — 94761 N-INVAS EAR/PLS OXIMETRY MLT: CPT

## 2025-01-03 PROCEDURE — 25000003 PHARM REV CODE 250: Performed by: NURSE PRACTITIONER

## 2025-01-03 PROCEDURE — 85025 COMPLETE CBC W/AUTO DIFF WBC: CPT | Performed by: INTERNAL MEDICINE

## 2025-01-03 PROCEDURE — 99233 SBSQ HOSP IP/OBS HIGH 50: CPT | Mod: ,,, | Performed by: INTERNAL MEDICINE

## 2025-01-03 PROCEDURE — 97110 THERAPEUTIC EXERCISES: CPT

## 2025-01-03 PROCEDURE — 97530 THERAPEUTIC ACTIVITIES: CPT

## 2025-01-03 PROCEDURE — 83735 ASSAY OF MAGNESIUM: CPT | Performed by: INTERNAL MEDICINE

## 2025-01-03 PROCEDURE — 63600175 PHARM REV CODE 636 W HCPCS: Performed by: NURSE PRACTITIONER

## 2025-01-03 PROCEDURE — 80053 COMPREHEN METABOLIC PANEL: CPT | Performed by: INTERNAL MEDICINE

## 2025-01-03 PROCEDURE — 20600001 HC STEP DOWN PRIVATE ROOM

## 2025-01-03 RX ORDER — PREDNISONE 20 MG/1
20 TABLET ORAL DAILY
Status: DISCONTINUED | OUTPATIENT
Start: 2025-01-11 | End: 2025-01-06 | Stop reason: HOSPADM

## 2025-01-03 RX ORDER — PREDNISONE 20 MG/1
60 TABLET ORAL DAILY
Status: COMPLETED | OUTPATIENT
Start: 2025-01-03 | End: 2025-01-06

## 2025-01-03 RX ORDER — TACROLIMUS 0.5 MG/1
0.5 CAPSULE ORAL EVERY EVENING
Status: DISCONTINUED | OUTPATIENT
Start: 2025-01-03 | End: 2025-01-06 | Stop reason: HOSPADM

## 2025-01-03 RX ORDER — LOPERAMIDE HYDROCHLORIDE 2 MG/1
2 CAPSULE ORAL 4 TIMES DAILY PRN
Status: DISCONTINUED | OUTPATIENT
Start: 2025-01-03 | End: 2025-01-06 | Stop reason: HOSPADM

## 2025-01-03 RX ORDER — PREDNISONE 5 MG/1
5 TABLET ORAL DAILY
Status: DISCONTINUED | OUTPATIENT
Start: 2025-01-19 | End: 2025-01-06 | Stop reason: HOSPADM

## 2025-01-03 RX ORDER — CARVEDILOL 6.25 MG/1
6.25 TABLET ORAL 2 TIMES DAILY
Status: DISCONTINUED | OUTPATIENT
Start: 2025-01-03 | End: 2025-01-06 | Stop reason: HOSPADM

## 2025-01-03 RX ORDER — PREDNISONE 20 MG/1
40 TABLET ORAL DAILY
Status: DISCONTINUED | OUTPATIENT
Start: 2025-01-07 | End: 2025-01-06 | Stop reason: HOSPADM

## 2025-01-03 RX ORDER — PREDNISONE 10 MG/1
10 TABLET ORAL DAILY
Status: DISCONTINUED | OUTPATIENT
Start: 2025-01-15 | End: 2025-01-06 | Stop reason: HOSPADM

## 2025-01-03 RX ORDER — PREDNISONE 5 MG/1
5 TABLET ORAL EVERY OTHER DAY
Status: DISCONTINUED | OUTPATIENT
Start: 2025-01-24 | End: 2025-01-06 | Stop reason: HOSPADM

## 2025-01-03 RX ADMIN — TACROLIMUS 0.5 MG: 0.5 CAPSULE ORAL at 08:01

## 2025-01-03 RX ADMIN — ACYCLOVIR 800 MG: 800 TABLET ORAL at 08:01

## 2025-01-03 RX ADMIN — POTASSIUM CHLORIDE 20 MEQ: 1500 TABLET, EXTENDED RELEASE ORAL at 09:01

## 2025-01-03 RX ADMIN — PREDNISONE 60 MG: 20 TABLET ORAL at 09:01

## 2025-01-03 RX ADMIN — LEVOFLOXACIN 500 MG: 500 TABLET, FILM COATED ORAL at 08:01

## 2025-01-03 RX ADMIN — POTASSIUM CHLORIDE 20 MEQ: 1500 TABLET, EXTENDED RELEASE ORAL at 06:01

## 2025-01-03 RX ADMIN — ONDANSETRON 8 MG: 8 TABLET, ORALLY DISINTEGRATING ORAL at 08:01

## 2025-01-03 RX ADMIN — SODIUM CHLORIDE: 9 INJECTION, SOLUTION INTRAVENOUS at 01:01

## 2025-01-03 RX ADMIN — CARVEDILOL 6.25 MG: 6.25 TABLET, FILM COATED ORAL at 09:01

## 2025-01-03 RX ADMIN — CARVEDILOL 6.25 MG: 6.25 TABLET, FILM COATED ORAL at 08:01

## 2025-01-03 RX ADMIN — FOLIC ACID 1 MG: 1 TABLET ORAL at 08:01

## 2025-01-03 RX ADMIN — PANTOPRAZOLE SODIUM 40 MG: 40 TABLET, DELAYED RELEASE ORAL at 08:01

## 2025-01-03 RX ADMIN — POSACONAZOLE 300 MG: 100 TABLET, DELAYED RELEASE ORAL at 08:01

## 2025-01-03 RX ADMIN — Medication 2 MG: at 09:01

## 2025-01-03 RX ADMIN — ATOVAQUONE 1500 MG: 750 SUSPENSION ORAL at 08:01

## 2025-01-03 RX ADMIN — GABAPENTIN 600 MG: 300 CAPSULE ORAL at 08:01

## 2025-01-03 RX ADMIN — TACROLIMUS 0.5 MG: 0.5 CAPSULE ORAL at 05:01

## 2025-01-03 RX ADMIN — ZOLPIDEM TARTRATE 5 MG: 5 TABLET, FILM COATED ORAL at 08:01

## 2025-01-03 RX ADMIN — ROPINIROLE HYDROCHLORIDE 0.5 MG: 0.25 TABLET, FILM COATED ORAL at 08:01

## 2025-01-03 NOTE — PROGRESS NOTES
Janusz Ma - Oncology ([facility])  Hematology  Bone Marrow Transplant  Progress Note    Patient Name: Guillaume Salinas  Admission Date: 12/26/2024  Hospital Length of Stay: 7 days  Code Status: Full Code    Subjective:     Interval History: Day + 105 from a Flu/Rosalee/TBI + PtCy Haplo (son) BMT for high grade MDS. Tacro level 5.4 this am, Tacro increased to 0.5 mg bid from daily. Bone marrow biopsy revealing 30-40% cellularity, erythroid hyperplasia, dyserythropoiesis, decreased megakaryocytes with atypical morphology. No hemophagocytosis mentioned. EBV up to 45,870. Completed 4 days of IV steroids, starting quick po taper today. Imodium changed to PRN given improvement in diarrhea. BP now high normal, up 14 lbs in the past week and net +intake. Will stop IVF and restart home Coreg.    Objective:     Vital Signs (Most Recent):  Temp: 97.6 °F (36.4 °C) (01/03/25 0737)  Pulse: 76 (01/03/25 0737)  Resp: 18 (01/03/25 0737)  BP: (!) 150/87 (01/03/25 0737)  SpO2: (!) 94 % (01/03/25 0737) Vital Signs (24h Range):  Temp:  [97.5 °F (36.4 °C)-97.8 °F (36.6 °C)] 97.6 °F (36.4 °C)  Pulse:  [68-76] 76  Resp:  [18] 18  SpO2:  [92 %-99 %] 94 %  BP: (108-164)/(73-95) 150/87     Weight: 69 kg (152 lb 3.6 oz)  Body mass index is 22.48 kg/m².  Body surface area is 1.83 meters squared.    Intake/Output - Last 3 Shifts         01/01 0700 01/02 0659 01/02 0700 01/03 0659 01/03 0700 01/04 0659    P.O. 480 990     I.V. (mL/kg) 3308.6 (52.5) 1553.4 (22.5) 252.3 (3.7)    Blood       IV Piggyback  0     Total Intake(mL/kg) 3788.6 (60.1) 2543.4 (36.9) 252.3 (3.7)    Urine (mL/kg/hr) 300 (0.2) 500 (0.3) 200 (1)    Stool   0    Total Output 300 500 200    Net +3488.6 +2043.4 +52.3           Urine Occurrence 6 x 2 x 1 x    Stool Occurrence   1 x             Physical Exam  Vitals and nursing note reviewed.   Constitutional:       Appearance: Normal appearance. He is well-developed.   HENT:      Head: Normocephalic and atraumatic.      Right  Ear: External ear normal.      Left Ear: External ear normal.      Mouth/Throat:      Mouth: Mucous membranes are dry.      Pharynx: No oropharyngeal exudate.   Eyes:      Extraocular Movements: Extraocular movements intact.      Conjunctiva/sclera: Conjunctivae normal.   Cardiovascular:      Rate and Rhythm: Normal rate and regular rhythm.      Pulses: Normal pulses.      Heart sounds: Normal heart sounds. No murmur heard.  Pulmonary:      Effort: Pulmonary effort is normal. No respiratory distress.      Breath sounds: Normal breath sounds. No stridor. No wheezing or rales.   Abdominal:      General: Bowel sounds are normal.      Palpations: Abdomen is soft.      Tenderness: There is no abdominal tenderness.   Musculoskeletal:         General: Normal range of motion.      Cervical back: Normal range of motion and neck supple.      Right lower leg: No edema.      Left lower leg: No edema.   Skin:     General: Skin is warm and dry.      Findings: Bruising present. No erythema or rash.      Comments: Right triple lumen vaughn clean and dry with no signs of infection   Neurological:      General: No focal deficit present.      Mental Status: He is alert and oriented to person, place, and time.      Motor: Weakness present.   Psychiatric:         Mood and Affect: Mood normal.         Behavior: Behavior normal.         Thought Content: Thought content normal.         Judgment: Judgment normal.            Significant Labs:   CBC:   Recent Labs   Lab 01/02/25  0511 01/03/25  0415   WBC 1.68* 1.79*   HGB 8.2* 8.1*   HCT 22.7* 22.9*   PLT 15* 13*    and CMP:   Recent Labs   Lab 01/02/25  0511 01/03/25  0415    139   K 3.2* 3.5    105   CO2 24 28   * 195*   BUN 17 17   CREATININE 1.0 0.8   CALCIUM 8.0* 7.8*   PROT 4.5* 4.8*   ALBUMIN 2.2* 2.4*   BILITOT 0.6 0.8   ALKPHOS 51 58   AST 38 52*   ALT 45* 63*   ANIONGAP 8 6*       Diagnostic Results:  None  Assessment/Plan:     * Neutropenic fever  - afebrile  since 12/30  - blood cultures x2 NGTD  - UA negative  - RIP/covid negative  - CXR with chronic LLL infiltrate; unable to determine severity  - CT c/a/p with new LLL consolidation, presumed infectious; trace pericholecystic fluid- edema vs. Cholecystitis  - given zosyn and vanc x1 in ED;   - completed 7 days of Zosyn, transitioned to ppx Levaquin  - Stopped IV and po Vanc 12/30  - Fever possible from HLH. Steroids 12/30-1/2, on taper    Pancytopenia  - Due to underlying disease and chemotherapy/transplant  - Transfuse for Hgb <7 g/dL and platelets <10k.   - Folate and copper deficient, on supplementation.   - Promacta sent in on 12/9/24. [employer] approved for financial assistance.   - Changed TMP/SMX to atovaquone.   - continue ppx antimicrobials as above  - elevated ferritin, elevated IL2 highly suspicious for HLH, BM bx with no evidence of HLH, dysplasia not suspected to be related to MDS, genetics and chimerisms pending.  - Received Rituxan on 12/29. Completed 4 days of Methylpred on 1/2.    HLH (hemophagocytic lymphohistiocytosis)  - Pancytopenia in the setting of uptrending EBV. Last EBV level from 12/26 was 24K. Trending weekly.  - Ferritin stable at 26K today. Continue daily levels.  - IL 2 elevated at 2952  - Received Rituxan on 12/29  - Bone marrow biopsy completed 12/30, results pending   - IV Methylpred 2mg/kg/day started 12/30. Completed 4 days and starting oral taper today.    S/P allogeneic bone marrow transplant  Status post haploidentical stem cell transplantation conditioned with FluMel 100 + PTCy+ 2Gy TBI . Currently Day+ 106. Engrafted on 10/09/24 day +20.  Day 30 bone marrow (11/5/2024) showing mildly hypercellular marrow with no increased blast (0.3%) and no evidence of myeloid neoplasm.   Day 100 bone marrow biopsy completed 12/30/24  Plan for central line removal in January with gen surg     - continue ppx acyclovir, posaconazole, and atovaquone  - level 1/3 is 5.4; tacro 0.5 mg increased to bid    - started 2mg/kg/day methylpred on 12/30 for possible GVHD of the GI tract and GI consulted for flex sig and EGD. Completed 4 days of methylpred on 1/2. Diarrhea resolved, deferring flex sig and EGD, GI has signed off.  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 He started budesonide 3mg TID on 10/28/24. Due to persistent nausea despite budesonide so started systemic steroids 11/1 at 0.5 mg/kg and his steroid completed 12/8/24.  - see tacro dosing under allogeneic BMT  - diarrhea improved, GI consulted for flex sig and EGD however will defer for now given improvement in diarrhea with steroids and ATC antidiarrheals. GI has signed off.   - methylpred 2mg/kg/day started 12/30. Completed 4 days and started oral taper today   - will change ATC Imodium to PRN    Myelodysplastic syndrome  - Status post treatment with azacitidine 75 mg/m2 daily x7 days plus venetoclax 100mg (voriconazole) daily x14 of 28 days.Venetoclax decreased to 7 days with cycle 3 until completion of 11 cycles.   - S/p Haplo BMT as above.   - No plans for maintenance at this time.     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 Will change Imodium to PRN   - GI has signed off    Copper deficiency  - Continue home copper    Folate deficiency  - Continue home folate    Hypokalemia  - Likely due to poor absorption with possible GI GVHD  - Repleting via PRN electrolyte  protocol    Hypophosphatemia  - daily phos level  - replace per PRN electrolyte protocol    Hypotension  - Etiology of hypotension is likely decreased oral intake, diarrhea, and fevers  - Improved with continuous IVF  RESOLVED    Hypertension, essential  - was holding home antihypertensives given hypotension on presentation, now with elevated BP, will restart home Coreg    Physical debility  - PT/OT consulted on admit, recommending  PT/OT    RLS (restless legs syndrome)  - Continue home ropinirole     Insomnia  - Continue home Ambien    ADDISON (acute kidney injury)  - Likely prerenal due to decreased PO intake. Received 2L IVF in ED; on continuous IVF  RESOLVED    Altered mental status  - Suspect steroid-/medication-induced.  RESOLVED        VTE Risk Mitigation (From admission, onward)           Ordered     heparin, porcine (PF) 100 unit/mL injection flush 500 Units  As needed (PRN)         12/29/24 1443     Reason for No Pharmacological VTE Prophylaxis  Once        Question:  Reasons:  Answer:  Thrombocytopenia    12/26/24 1330     IP VTE HIGH RISK PATIENT  Once         12/26/24 1330     Place sequential compression device  Until discontinued         12/26/24 1330                    Disposition: inpatient     Meg Palacios NP  Bone Marrow Transplant  Janusz Ma - Oncology (Hospital)

## 2025-01-03 NOTE — ASSESSMENT & PLAN NOTE
Status post haploidentical stem cell transplantation conditioned with FluMel 100 + PTCy+ 2Gy TBI . Currently Day+ 106. Engrafted on 10/09/24 day +20.  Day 30 bone marrow (11/5/2024) showing mildly hypercellular marrow with no increased blast (0.3%) and no evidence of myeloid neoplasm.   Day 100 bone marrow biopsy completed 12/30/24  Plan for central line removal in January with gen surg     - continue ppx acyclovir, posaconazole, and atovaquone  - level 1/3 is 5.4; tacro 0.5 mg increased to bid   - started 2mg/kg/day methylpred on 12/30 for possible GVHD of the GI tract and GI consulted for flex sig and EGD. Completed 4 days of methylpred on 1/2. Diarrhea resolved, deferring flex sig and EGD, GI has signed off.  - daily acute and chronic (starting 21/28 at Day +100) GVHD charting while inpatient  - monitor weekly EBV/CMV; last EBV on 12/26 is 93632, EBV drawn 1/2 is 05600; last CMV same date undetected  - continue cmv ppx with letermovir (patient currently does not have, medication has been shipped)

## 2025-01-03 NOTE — ASSESSMENT & PLAN NOTE
- GVHD prophylaxis with Post-transplant cyclophosphamide, Tacrolimus, MMF (MMF d/c on D+35).   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 He started budesonide 3mg TID on 10/28/24. Due to persistent nausea despite budesonide so started systemic steroids 11/1 at 0.5 mg/kg and his steroid completed 12/8/24.  - see tacro dosing under allogeneic BMT  - diarrhea improved, GI consulted for flex sig and EGD however will defer for now given improvement in diarrhea with steroids and ATC antidiarrheals. GI has signed off.   - methylpred 2mg/kg/day started 12/30. Completed 4 days and started oral taper today   - will change ATC Imodium to PRN

## 2025-01-03 NOTE — ASSESSMENT & PLAN NOTE
- was holding home antihypertensives given hypotension on presentation, now with elevated BP, will restart home Coreg

## 2025-01-03 NOTE — ASSESSMENT & PLAN NOTE
- afebrile since 12/30  - blood cultures x2 NGTD  - UA negative  - RIP/covid negative  - CXR with chronic LLL infiltrate; unable to determine severity  - CT c/a/p with new LLL consolidation, presumed infectious; trace pericholecystic fluid- edema vs. Cholecystitis  - given zosyn and vanc x1 in ED;   - completed 7 days of Zosyn, transitioned to ppx Levaquin  - Stopped IV and po Vanc 12/30  - Fever possible from HLH. Steroids 12/30-1/2, on taper

## 2025-01-03 NOTE — ASSESSMENT & PLAN NOTE
- Due to underlying disease and chemotherapy/transplant  - Transfuse for Hgb <7 g/dL and platelets <10k.   - Folate and copper deficient, on supplementation.   - Promacta sent in on 12/9/24. [employer] approved for financial assistance.   - Changed TMP/SMX to atovaquone.   - continue ppx antimicrobials as above  - elevated ferritin, elevated IL2 highly suspicious for HLH, BM bx with no evidence of HLH, dysplasia not suspected to be related to MDS, genetics and chimerisms pending.  - Received Rituxan on 12/29. Completed 4 days of Methylpred on 1/2.

## 2025-01-03 NOTE — PLAN OF CARE
Problem: Adult Inpatient Plan of Care  Goal: Plan of Care Review  Outcome: Progressing  Flowsheets (Taken 1/3/2025 0147)  Plan of Care Reviewed With:   patient   family     Problem: Infection  Goal: Absence of Infection Signs and Symptoms  Outcome: Progressing  Intervention: Prevent or Manage Infection  Flowsheets (Taken 1/3/2025 0147)  Infection Management: aseptic technique maintained  Isolation Precautions:   precautions maintained   protective     Assumed care of pt  [id number]  Patient involved in plan of care and communication needs throughout shift        -Dx: Neutropenic fever  -AAOx4  -Denies pain   -Assist x 1 with transfers   -Regular diet  -Remains afebrile  -Voids without issues  -No BM this shift   -RA;    -Skin intact               -q 2 hour patient rounds. VSS , no acute events so far this shift. Son remians at bedside.  Non-skid socks when out of bed. Bed locked and in lowest position. Call light within reach as well as all belongings. Instructed to call for assistance before getting out of bed, verbalized understanding. Will continue to monitor.

## 2025-01-03 NOTE — ASSESSMENT & PLAN NOTE
- Pancytopenia in the setting of uptrending EBV. Last EBV level from 12/26 was 24K. Trending weekly.  - Ferritin stable at 26K today. Continue daily levels.  - IL 2 elevated at 2952  - Received Rituxan on 12/29  - Bone marrow biopsy completed 12/30, results pending   - IV Methylpred 2mg/kg/day started 12/30. Completed 4 days and starting oral taper today.

## 2025-01-03 NOTE — ASSESSMENT & PLAN NOTE
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 Will change Imodium to PRN   - GI has signed off

## 2025-01-03 NOTE — PLAN OF CARE
Haplo Allo SCT Day +106. Carvedilol started. Electrolytes replaced. IV fluids discontinued. One episode of emesis. POC reviewed with patient; understanding verbalized. Pt. has nonskid footwear on, bed in lowest position, and locked with bed rails up x2.  Pt. instructed to call prior to getting OOB.  Pt. has call light and personal items within reach. Family at bedside. VSS and afebrile this shift. All questions and concerns addressed at this time.

## 2025-01-03 NOTE — SUBJECTIVE & OBJECTIVE
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 Tacro level 5.4 this am, Tacro increased to 0.5 mg bid from daily. Bone marrow biopsy revealing 30-40% cellularity, erythroid hyperplasia, dyserythropoiesis, decreased megakaryocytes with atypical morphology. No hemophagocytosis mentioned. EBV up to 45,870. Completed 4 days of IV steroids, starting quick po taper today. Imodium changed to PRN given improvement in diarrhea. BP now high normal, up 14 lbs in the past week and net +intake. Will stop IVF and restart home Coreg.    Objective:     Vital Signs (Most Recent):  Temp: 97.6 °F (36.4 °C) (01/03/25 0737)  Pulse: 76 (01/03/25 0737)  Resp: 18 (01/03/25 0737)  BP: (!) 150/87 (01/03/25 0737)  SpO2: (!) 94 % (01/03/25 0737) Vital Signs (24h Range):  Temp:  [97.5 °F (36.4 °C)-97.8 °F (36.6 °C)] 97.6 °F (36.4 °C)  Pulse:  [68-76] 76  Resp:  [18] 18  SpO2:  [92 %-99 %] 94 %  BP: (108-164)/(73-95) 150/87     Weight: 69 kg (152 lb 3.6 oz)  Body mass index is 22.48 kg/m².  Body surface area is 1.83 meters squared.    Intake/Output - Last 3 Shifts         01/01 0700 01/02 0659 01/02 0700 01/03 0659 01/03 0700 01/04 0659    P.O. 480 990     I.V. (mL/kg) 3308.6 (52.5) 1553.4 (22.5) 252.3 (3.7)    Blood       IV Piggyback  0     Total Intake(mL/kg) 3788.6 (60.1) 2543.4 (36.9) 252.3 (3.7)    Urine (mL/kg/hr) 300 (0.2) 500 (0.3) 200 (1)    Stool   0    Total Output 300 500 200    Net +3488.6 +2043.4 +52.3           Urine Occurrence 6 x 2 x 1 x    Stool Occurrence   1 x             Physical Exam  Vitals and nursing note reviewed.   Constitutional:       Appearance: Normal appearance. He is well-developed.   HENT:      Head: Normocephalic and atraumatic.      Right Ear: External ear normal.      Left Ear: External ear normal.      Mouth/Throat:      Mouth: Mucous membranes are dry.      Pharynx: No oropharyngeal exudate.   Eyes:      Extraocular Movements: Extraocular  movements intact.      Conjunctiva/sclera: Conjunctivae normal.   Cardiovascular:      Rate and Rhythm: Normal rate and regular rhythm.      Pulses: Normal pulses.      Heart sounds: Normal heart sounds. No murmur heard.  Pulmonary:      Effort: Pulmonary effort is normal. No respiratory distress.      Breath sounds: Normal breath sounds. No stridor. No wheezing or rales.   Abdominal:      General: Bowel sounds are normal.      Palpations: Abdomen is soft.      Tenderness: There is no abdominal tenderness.   Musculoskeletal:         General: Normal range of motion.      Cervical back: Normal range of motion and neck supple.      Right lower leg: No edema.      Left lower leg: No edema.   Skin:     General: Skin is warm and dry.      Findings: Bruising present. No erythema or rash.      Comments: Right triple lumen vaughn clean and dry with no signs of infection   Neurological:      General: No focal deficit present.      Mental Status: He is alert and oriented to person, place, and time.      Motor: Weakness present.   Psychiatric:         Mood and Affect: Mood normal.         Behavior: Behavior normal.         Thought Content: Thought content normal.         Judgment: Judgment normal.            Significant Labs:   CBC:   Recent Labs   Lab 01/02/25  0511 01/03/25  0415   WBC 1.68* 1.79*   HGB 8.2* 8.1*   HCT 22.7* 22.9*   PLT 15* 13*    and CMP:   Recent Labs   Lab 01/02/25  0511 01/03/25  0415    139   K 3.2* 3.5    105   CO2 24 28   * 195*   BUN 17 17   CREATININE 1.0 0.8   CALCIUM 8.0* 7.8*   PROT 4.5* 4.8*   ALBUMIN 2.2* 2.4*   BILITOT 0.6 0.8   ALKPHOS 51 58   AST 38 52*   ALT 45* 63*   ANIONGAP 8 6*       Diagnostic Results:  None

## 2025-01-03 NOTE — PT/OT/SLP PROGRESS
Occupational Therapy   Treatment    Name: Guillaume Salinas  MRN: [id number]  Admitting Diagnosis:  Neutropenic fever       Recommendations:     Discharge Recommendations: Low Intensity Therapy  Discharge Equipment Recommendations:  walker, rolling  Barriers to discharge:  None    Assessment:     Guillaume Salinas is a 69 y.o. male with a medical diagnosis of Neutropenic fever.  He presents with performance deficits affecting function are weakness, gait instability, impaired self care skills, impaired endurance, pain, edema. Pt presents in bed, agreeable to tx. He sits EOB for UE theraband exercises with good tolerance, then opts to ambulate in vergara with mask donned. He tolerates 100ft with RW given SBA for safety. Reports some pain in B LE, noted moderate edema in both feet/ankles. He returns to HOB elevated, left comfortable. At this time, pt is a good candidate for low intensity tx at the post acute level to address deficits impacting safety and IND with daily task in and around the home.      Rehab Prognosis:  Good; patient would benefit from acute skilled OT services to address these deficits and reach maximum level of function.       Plan:     Patient to be seen 3 x/week to address the above listed problems via self-care/home management, therapeutic activities, therapeutic exercises  Plan of Care Expires: 01/27/25  Plan of Care Reviewed with: patient, family    Subjective     Chief Complaint: Leg pain ambulating  Patient/Family Comments/goals: To regain full IND  Pain/Comfort:  Pain Rating 1: 0/10  Location - Side 1: Bilateral  Location - Orientation 1: lower  Location 1: leg  Pain Addressed 1: Cessation of Activity, Reposition  Pain Rating Post-Intervention 1: 4/10    Objective:     Communicated with: Migel prior to session.  Patient found HOB elevated with telemetry upon OT entry to room.    General Precautions: Standard, fall    Orthopedic Precautions:N/A  Braces: N/A  Respiratory Status: Room  air     Occupational Performance:     Bed Mobility:    Patient completed Scooting/Bridging with supervision  Patient completed Supine to Sit with supervision  Patient completed Sit to Supine with supervision   Sits EOB IND    Functional Mobility/Transfers:  Patient completed Sit <> Stand Transfer with stand by assistance  with  rolling walker   Functional Mobility: Ambulates in room and vergara 100ft with RW, reduced stride length, no LOB, slow cautious pace. He reports some pain in lower legs after ~40ft, tolerates additional ambulation before return to bed.     Activities of Daily Living:  Deferred need.      [facility] 6 Click ADL: 23    Treatment & Education:  Pt provided red theraband, educated on purpose and proper use.   Provided demo and completes 1 x10 each with band:  External rotation, shoulder flexion, shoulder abduction, tricep ext, bicep curl, rows, chest press, shoulder adduction    Educated on core exercise with band and without to build strength for balance and functional reach    -Education on importance of OOB activity to improve overall activity tolerance and promote recovery  -Pt educated to call for assistance and to transfer with hospital staff only    Pt had no further questions & when asked whether there were any concerns pt reported none.     Patient left HOB elevated with all lines intact, call button in reach, nsg notified, and family present    GOALS:   Multidisciplinary Problems       Occupational Therapy Goals          Problem: Occupational Therapy    Goal Priority Disciplines Outcome Interventions   Occupational Therapy Goal     OT, PT/OT Progressing    Description: Goals to be met by: 1/10/2025     Patient will increase functional independence with ADLs by performing:    UE Dressing with Supervision.  LE Dressing with Supervision.  Grooming while standing at sink with Supervision.  Toileting from toilet with Supervision for hygiene and clothing management. -MET 12/30/2024  Toilet transfer to  toilet with Supervision.                         Time Tracking:     OT Date of Treatment: 01/03/25  OT Start Time: 0940  OT Stop Time: 1003  OT Total Time (min): 23 min    Billable Minutes:Therapeutic Activity 8  Therapeutic Exercise 15    OT/DIMITRIS: OT          1/3/2025

## 2025-01-04 PROBLEM — I95.9 HYPOTENSION: Status: RESOLVED | Noted: 2024-12-26 | Resolved: 2025-01-04

## 2025-01-04 PROBLEM — R41.82 ALTERED MENTAL STATUS: Status: RESOLVED | Noted: 2024-12-31 | Resolved: 2025-01-04

## 2025-01-04 LAB
ABO + RH BLD: NORMAL
ALBUMIN SERPL BCP-MCNC: 2.5 G/DL (ref 3.5–5.2)
ALP SERPL-CCNC: 57 U/L (ref 40–150)
ALT SERPL W/O P-5'-P-CCNC: 71 U/L (ref 10–44)
ANION GAP SERPL CALC-SCNC: 7 MMOL/L (ref 8–16)
ANISOCYTOSIS BLD QL SMEAR: SLIGHT
AST SERPL-CCNC: 54 U/L (ref 10–40)
BACTERIA BLD CULT: NORMAL
BACTERIA BLD CULT: NORMAL
BASOPHILS # BLD AUTO: 0 K/UL (ref 0–0.2)
BASOPHILS NFR BLD: 0 % (ref 0–1.9)
BILIRUB SERPL-MCNC: 1 MG/DL (ref 0.1–1)
BLD GP AB SCN CELLS X3 SERPL QL: NORMAL
BLD PROD TYP BPU: NORMAL
BLOOD UNIT EXPIRATION DATE: NORMAL
BLOOD UNIT TYPE CODE: 8400
BLOOD UNIT TYPE: NORMAL
BUN SERPL-MCNC: 19 MG/DL (ref 8–23)
CALCIUM SERPL-MCNC: 8.2 MG/DL (ref 8.7–10.5)
CHLORIDE SERPL-SCNC: 101 MMOL/L (ref 95–110)
CO2 SERPL-SCNC: 30 MMOL/L (ref 23–29)
CODING SYSTEM: NORMAL
CREAT SERPL-MCNC: 0.8 MG/DL (ref 0.5–1.4)
CROSSMATCH INTERPRETATION: NORMAL
DACRYOCYTES BLD QL SMEAR: ABNORMAL
DIFFERENTIAL METHOD BLD: ABNORMAL
DISPENSE STATUS: NORMAL
EOSINOPHIL # BLD AUTO: 0 K/UL (ref 0–0.5)
EOSINOPHIL NFR BLD: 0 % (ref 0–8)
ERYTHROCYTE [DISTWIDTH] IN BLOOD BY AUTOMATED COUNT: 15.1 % (ref 11.5–14.5)
EST. GFR  (NO RACE VARIABLE): >60 ML/MIN/1.73 M^2
FERRITIN SERPL-MCNC: ABNORMAL NG/ML (ref 20–300)
GLUCOSE SERPL-MCNC: 166 MG/DL (ref 70–110)
HCT VFR BLD AUTO: 22.9 % (ref 40–54)
HGB BLD-MCNC: 8.1 G/DL (ref 14–18)
HYPOCHROMIA BLD QL SMEAR: ABNORMAL
IMM GRANULOCYTES # BLD AUTO: 0.02 K/UL (ref 0–0.04)
IMM GRANULOCYTES NFR BLD AUTO: 1 % (ref 0–0.5)
LYMPHOCYTES # BLD AUTO: 1 K/UL (ref 1–4.8)
LYMPHOCYTES NFR BLD: 52.3 % (ref 18–48)
MAGNESIUM SERPL-MCNC: 1.4 MG/DL (ref 1.6–2.6)
MCH RBC QN AUTO: 37.2 PG (ref 27–31)
MCHC RBC AUTO-ENTMCNC: 35.4 G/DL (ref 32–36)
MCV RBC AUTO: 105 FL (ref 82–98)
MONOCYTES # BLD AUTO: 0.2 K/UL (ref 0.3–1)
MONOCYTES NFR BLD: 7.5 % (ref 4–15)
NEUTROPHILS # BLD AUTO: 0.8 K/UL (ref 1.8–7.7)
NEUTROPHILS NFR BLD: 39.2 % (ref 38–73)
NRBC BLD-RTO: 2 /100 WBC
OVALOCYTES BLD QL SMEAR: ABNORMAL
PHOSPHATE SERPL-MCNC: 2.7 MG/DL (ref 2.7–4.5)
PLATELET # BLD AUTO: 9 K/UL (ref 150–450)
PLATELET BLD QL SMEAR: ABNORMAL
PMV BLD AUTO: ABNORMAL FL (ref 9.2–12.9)
POIKILOCYTOSIS BLD QL SMEAR: SLIGHT
POLYCHROMASIA BLD QL SMEAR: ABNORMAL
POTASSIUM SERPL-SCNC: 3.2 MMOL/L (ref 3.5–5.1)
PROT SERPL-MCNC: 4.8 G/DL (ref 6–8.4)
RBC # BLD AUTO: 2.18 M/UL (ref 4.6–6.2)
SCHISTOCYTES BLD QL SMEAR: ABNORMAL
SODIUM SERPL-SCNC: 138 MMOL/L (ref 136–145)
SPECIMEN OUTDATE: NORMAL
UNIT NUMBER: NORMAL
WBC # BLD AUTO: 1.99 K/UL (ref 3.9–12.7)

## 2025-01-04 PROCEDURE — 63600175 PHARM REV CODE 636 W HCPCS: Performed by: NURSE PRACTITIONER

## 2025-01-04 PROCEDURE — 82728 ASSAY OF FERRITIN: CPT | Performed by: NURSE PRACTITIONER

## 2025-01-04 PROCEDURE — 85025 COMPLETE CBC W/AUTO DIFF WBC: CPT | Performed by: INTERNAL MEDICINE

## 2025-01-04 PROCEDURE — 63600175 PHARM REV CODE 636 W HCPCS: Performed by: STUDENT IN AN ORGANIZED HEALTH CARE EDUCATION/TRAINING PROGRAM

## 2025-01-04 PROCEDURE — 63600175 PHARM REV CODE 636 W HCPCS: Performed by: INTERNAL MEDICINE

## 2025-01-04 PROCEDURE — 86900 BLOOD TYPING SEROLOGIC ABO: CPT | Performed by: STUDENT IN AN ORGANIZED HEALTH CARE EDUCATION/TRAINING PROGRAM

## 2025-01-04 PROCEDURE — 25000003 PHARM REV CODE 250: Performed by: NURSE PRACTITIONER

## 2025-01-04 PROCEDURE — 83735 ASSAY OF MAGNESIUM: CPT | Performed by: INTERNAL MEDICINE

## 2025-01-04 PROCEDURE — 20600001 HC STEP DOWN PRIVATE ROOM

## 2025-01-04 PROCEDURE — 84100 ASSAY OF PHOSPHORUS: CPT | Performed by: INTERNAL MEDICINE

## 2025-01-04 PROCEDURE — 80053 COMPREHEN METABOLIC PANEL: CPT | Performed by: INTERNAL MEDICINE

## 2025-01-04 PROCEDURE — 99232 SBSQ HOSP IP/OBS MODERATE 35: CPT | Mod: GC,,, | Performed by: INTERNAL MEDICINE

## 2025-01-04 PROCEDURE — P9037 PLATE PHERES LEUKOREDU IRRAD: HCPCS | Performed by: STUDENT IN AN ORGANIZED HEALTH CARE EDUCATION/TRAINING PROGRAM

## 2025-01-04 PROCEDURE — 25000003 PHARM REV CODE 250: Performed by: INTERNAL MEDICINE

## 2025-01-04 RX ORDER — DIPHENHYDRAMINE HYDROCHLORIDE 50 MG/ML
25 INJECTION INTRAMUSCULAR; INTRAVENOUS EVERY 6 HOURS PRN
Status: DISCONTINUED | OUTPATIENT
Start: 2025-01-04 | End: 2025-01-06 | Stop reason: HOSPADM

## 2025-01-04 RX ORDER — HYDROCODONE BITARTRATE AND ACETAMINOPHEN 500; 5 MG/1; MG/1
TABLET ORAL
Status: DISCONTINUED | OUTPATIENT
Start: 2025-01-04 | End: 2025-01-06

## 2025-01-04 RX ORDER — DICYCLOMINE HYDROCHLORIDE 10 MG/1
10 CAPSULE ORAL 4 TIMES DAILY PRN
Status: DISCONTINUED | OUTPATIENT
Start: 2025-01-04 | End: 2025-01-06 | Stop reason: HOSPADM

## 2025-01-04 RX ORDER — MAGNESIUM SULFATE HEPTAHYDRATE 40 MG/ML
2 INJECTION, SOLUTION INTRAVENOUS ONCE
Status: COMPLETED | OUTPATIENT
Start: 2025-01-04 | End: 2025-01-04

## 2025-01-04 RX ADMIN — CARVEDILOL 6.25 MG: 6.25 TABLET, FILM COATED ORAL at 08:01

## 2025-01-04 RX ADMIN — LEVOFLOXACIN 500 MG: 500 TABLET, FILM COATED ORAL at 08:01

## 2025-01-04 RX ADMIN — DIPHENOXYLATE HYDROCHLORIDE AND ATROPINE SULFATE 1 TABLET: 2.5; .025 TABLET ORAL at 06:01

## 2025-01-04 RX ADMIN — TACROLIMUS 0.5 MG: 0.5 CAPSULE ORAL at 08:01

## 2025-01-04 RX ADMIN — ACYCLOVIR 800 MG: 800 TABLET ORAL at 09:01

## 2025-01-04 RX ADMIN — ROPINIROLE HYDROCHLORIDE 0.5 MG: 0.25 TABLET, FILM COATED ORAL at 09:01

## 2025-01-04 RX ADMIN — POTASSIUM CHLORIDE 20 MEQ: 1500 TABLET, EXTENDED RELEASE ORAL at 05:01

## 2025-01-04 RX ADMIN — POSACONAZOLE 300 MG: 100 TABLET, DELAYED RELEASE ORAL at 08:01

## 2025-01-04 RX ADMIN — DIPHENHYDRAMINE HYDROCHLORIDE 25 MG: 50 INJECTION, SOLUTION INTRAMUSCULAR; INTRAVENOUS at 06:01

## 2025-01-04 RX ADMIN — POTASSIUM CHLORIDE 20 MEQ: 1500 TABLET, EXTENDED RELEASE ORAL at 09:01

## 2025-01-04 RX ADMIN — TACROLIMUS 0.5 MG: 0.5 CAPSULE ORAL at 05:01

## 2025-01-04 RX ADMIN — MAGNESIUM SULFATE HEPTAHYDRATE 2 G: 40 INJECTION, SOLUTION INTRAVENOUS at 10:01

## 2025-01-04 RX ADMIN — ACETAMINOPHEN 650 MG: 325 TABLET ORAL at 06:01

## 2025-01-04 RX ADMIN — ACYCLOVIR 800 MG: 800 TABLET ORAL at 08:01

## 2025-01-04 RX ADMIN — PANTOPRAZOLE SODIUM 40 MG: 40 TABLET, DELAYED RELEASE ORAL at 08:01

## 2025-01-04 RX ADMIN — Medication 2 MG: at 08:01

## 2025-01-04 RX ADMIN — PREDNISONE 60 MG: 20 TABLET ORAL at 08:01

## 2025-01-04 RX ADMIN — ATOVAQUONE 1500 MG: 750 SUSPENSION ORAL at 08:01

## 2025-01-04 RX ADMIN — ZOLPIDEM TARTRATE 5 MG: 5 TABLET, FILM COATED ORAL at 09:01

## 2025-01-04 RX ADMIN — GABAPENTIN 600 MG: 300 CAPSULE ORAL at 09:01

## 2025-01-04 RX ADMIN — FOLIC ACID 1 MG: 1 TABLET ORAL at 08:01

## 2025-01-04 RX ADMIN — CARVEDILOL 6.25 MG: 6.25 TABLET, FILM COATED ORAL at 09:01

## 2025-01-04 RX ADMIN — POTASSIUM CHLORIDE 20 MEQ: 1500 TABLET, EXTENDED RELEASE ORAL at 12:01

## 2025-01-04 NOTE — ASSESSMENT & PLAN NOTE
- GVHD prophylaxis with Post-transplant cyclophosphamide, Tacrolimus, MMF (MMF d/c on D+35).   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 He started budesonide 3mg TID on 10/28/24. Due to persistent nausea despite budesonide so started systemic steroids 11/1 at 0.5 mg/kg and his steroid completed 12/8/24.  - see tacro dosing under allogeneic BMT  - diarrhea improved, GI consulted for flex sig and EGD however will defer for now given improvement in diarrhea with steroids and ATC antidiarrheals. GI has signed off.   - methylpred 2mg/kg/day started 12/30. Completed 4 days and now on oral taper   - will change ATC Imodium to PRN

## 2025-01-04 NOTE — PLAN OF CARE
Haplo Allo SCT Day + 107. One unit of platelets given, no adverse reaction noted. Electrolytes replaced. POC reviewed with patient; understanding verbalized. Pt. has nonskid footwear on, bed in lowest position, and locked with bed rails up x2.  Pt. instructed to call prior to getting OOB.  Pt. has call light and personal items within reach. Family at the bedside. VSS and afebrile this shift. All questions and concerns addressed at this time.

## 2025-01-04 NOTE — PLAN OF CARE
Problem: Adult Inpatient Plan of Care  Goal: Plan of Care Review  Outcome: Progressing  Flowsheets (Taken 1/4/2025 0632)  Plan of Care Reviewed With: patient     Problem: Infection  Goal: Absence of Infection Signs and Symptoms  Outcome: Progressing  Intervention: Prevent or Manage Infection  Flowsheets (Taken 1/4/2025 0632)  Infection Management: aseptic technique maintained  Isolation Precautions:   precautions maintained   protective   Assumed care of pt  [phone]  Patient involved in plan of care and communication needs throughout shift        -Dx: Neutropenic fever  -AAOx4  -Denies pain   -Assist x 1 with transfers   -Regular diet, appetite poor   -Remains afebrile  -Voids without issues  -3 loose stools reported overnight, lomotil given   -RA;    -Skin intact               -q 2 hour patient rounds. VSS , no acute events so far this shift. Daughter remians at bedside.  Non-skid socks when out of bed. Bed locked and in lowest position. Call light within reach as well as all belongings. Instructed to call for assistance before getting out of bed, verbalized understanding. Electrolytes replaced, Will continue to monitor.

## 2025-01-04 NOTE — PROGRESS NOTES
Janusz Ma - Oncology ([facility])  Hematology  Bone Marrow Transplant  Progress Note    Patient Name: Guillaume Salinas  Admission Date: 12/26/2024  Hospital Length of Stay: 8 days  Code Status: Full Code    Subjective:     Interval History: Day + 106 from a Flu/Rosalee/TBI + PtCy Haplo (son) BMT for high grade MDS. 3 loose stools noted overnight however overall improved. Transfusing 1u PLT and K and Mg. On PO steroid taper. Seen comfortably sleeping today    Objective:     Vital Signs (Most Recent):  Temp: 97.6 °F (36.4 °C) (01/04/25 1203)  Pulse: 67 (01/04/25 1203)  Resp: 20 (01/04/25 1203)  BP: 126/77 (01/04/25 1203)  SpO2: (!) 94 % (01/04/25 1203) Vital Signs (24h Range):  Temp:  [97.6 °F (36.4 °C)-98.2 °F (36.8 °C)] 97.6 °F (36.4 °C)  Pulse:  [67-82] 67  Resp:  [16-20] 20  SpO2:  [93 %-95 %] 94 %  BP: (102-166)/() 126/77     Weight: 67.1 kg (148 lb 0.6 oz)  Body mass index is 21.86 kg/m².  Body surface area is 1.81 meters squared.    ECOG SCORE           [unfilled]    Intake/Output - Last 3 Shifts         01/02 0700  01/03 0659 01/03 0700 01/04 0659 01/04 0700  01/05 0659    P.O. 990 240 200    I.V. (mL/kg) 1553.4 (22.5) 252.3 (3.8)     Blood   175    IV Piggyback 0      Total Intake(mL/kg) 2543.4 (36.9) 492.3 (7.3) 375 (5.6)    Urine (mL/kg/hr) 500 (0.3) 550 (0.3)     Emesis/NG output  0     Stool  0     Total Output 500 550     Net +2043.4 -57.7 +375           Urine Occurrence 2 x 3 x 1 x    Stool Occurrence  4 x 1 x    Emesis Occurrence  1 x              Physical Exam  Vitals and nursing note reviewed.   Constitutional:       Appearance: Normal appearance. He is well-developed.   HENT:      Head: Normocephalic and atraumatic.      Right Ear: External ear normal.      Left Ear: External ear normal.      Mouth/Throat:      Mouth: Mucous membranes are dry.      Pharynx: No oropharyngeal exudate.   Eyes:      Extraocular Movements: Extraocular movements intact.      Conjunctiva/sclera: Conjunctivae  normal.   Cardiovascular:      Rate and Rhythm: Normal rate and regular rhythm.      Pulses: Normal pulses.      Heart sounds: Normal heart sounds. No murmur heard.  Pulmonary:      Effort: Pulmonary effort is normal. No respiratory distress.      Breath sounds: Normal breath sounds. No stridor. No wheezing or rales.   Abdominal:      General: Bowel sounds are normal.      Palpations: Abdomen is soft.      Tenderness: There is no abdominal tenderness.   Musculoskeletal:         General: Normal range of motion.      Cervical back: Normal range of motion and neck supple.      Right lower leg: No edema.      Left lower leg: No edema.   Skin:     General: Skin is warm and dry.      Findings: Bruising present. No erythema or rash.      Comments: Right triple lumen vaughn clean and dry with no signs of infection   Neurological:      General: No focal deficit present.      Mental Status: He is alert and oriented to person, place, and time.      Motor: Weakness present.   Psychiatric:         Mood and Affect: Mood normal.         Behavior: Behavior normal.         Thought Content: Thought content normal.         Judgment: Judgment normal.            Significant Labs:   All pertinent labs from the last 24 hours have been reviewed.    Diagnostic Results:  I have reviewed all pertinent imaging results/findings within the past 24 hours.  Assessment/Plan:     * Neutropenic fever  - afebrile since 12/30  - blood cultures x2 NGTD  - UA negative  - RIP/covid negative  - CXR with chronic LLL infiltrate; unable to determine severity  - CT c/a/p with new LLL consolidation, presumed infectious; trace pericholecystic fluid- edema vs. Cholecystitis  - given zosyn and vanc x1 in ED;   - completed 7 days of Zosyn, transitioned to ppx Levaquin  - Stopped IV and po Vanc 12/30  - Fever possible from HLH. 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 Last EBV level from 12/26 was 24K. Trending weekly.  - Ferritin stable at 26K today. Continue daily levels.  - IL 2 elevated at 2952  - Received Rituxan on 12/29  - Bone marrow biopsy completed 12/30, results pending   - IV Methylpred 2mg/kg/day started 12/30. Completed 4 days and starting oral taper today.    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 Will change Imodium to PRN   - GI has signed off    Folate deficiency  - Continue home folate    Copper deficiency  - Continue home copper    RLS (restless legs syndrome)  - Continue home ropinirole     Pancytopenia  - Due to underlying disease and chemotherapy/transplant  - Transfuse for Hgb <7 g/dL and platelets <10k.   - Folate and copper deficient, on supplementation.   - Promacta sent in on 12/9/24. [employer] approved for financial assistance.   - Changed TMP/SMX to atovaquone.   - continue ppx antimicrobials as above  - elevated ferritin, elevated IL2 highly suspicious for HLH, BM bx with no evidence of HLH, dysplasia not suspected to be related to MDS, genetics and chimerisms pending.  - Received Rituxan on 12/29. Completed 4 days of Methylpred on 1/2.    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 He started budesonide 3mg TID on 10/28/24. Due to persistent nausea despite budesonide so started systemic steroids 11/1 at 0.5 mg/kg and his steroid completed 12/8/24.  - see tacro dosing under allogeneic BMT  - diarrhea improved, GI consulted for flex sig and EGD however will defer for now given improvement in  diarrhea with steroids and ATC antidiarrheals. GI has signed off.   - methylpred 2mg/kg/day started 12/30. Completed 4 days and now on oral taper   - will change ATC Imodium to PRN    ADDISON (acute kidney injury)  - Likely prerenal due to decreased PO intake. Received 2L IVF in ED; on continuous IVF  RESOLVED    Insomnia  - Continue home Ambien    S/P allogeneic bone marrow transplant  Status post haploidentical stem cell transplantation conditioned with FluMel 100 + PTCy+ 2Gy TBI . Currently Day+ 106. Engrafted on 10/09/24 day +20.  Day 30 bone marrow (11/5/2024) showing mildly hypercellular marrow with no increased blast (0.3%) and no evidence of myeloid neoplasm.   Day 100 bone marrow biopsy completed 12/30/24  Plan for central line removal in January with gen surg     - continue ppx acyclovir, posaconazole, and atovaquone  - level 1/3 is 5.4; tacro 0.5 mg increased to bid   - started 2mg/kg/day methylpred on 12/30 for possible GVHD of the GI tract and GI consulted for flex sig and EGD. Completed 4 days of methylpred on 1/2. Diarrhea resolved, deferring flex sig and EGD, GI has signed off.  - daily acute and chronic (starting 21/28 at Day +100) GVHD charting while inpatient  - monitor weekly EBV/CMV; last EBV on 12/26 is 18939, EBV drawn 1/2 is 85902; last CMV same date undetected  - continue cmv ppx with letermovir (patient currently does not have, medication has been shipped)    Myelodysplastic syndrome  - Status post treatment with azacitidine 75 mg/m2 daily x7 days plus venetoclax 100mg (voriconazole) daily x14 of 28 days.Venetoclax decreased to 7 days with cycle 3 until completion of 11 cycles.   - S/p Haplo BMT as above.   - No plans for maintenance at this time.     Hypertension, essential  - was holding home antihypertensives given hypotension on presentation, now with elevated BP, will restart home Coreg    Physical debility  - PT/OT consulted on admit, recommending  PT/OT        VTE Risk Mitigation (From  admission, onward)           Ordered     heparin, porcine (PF) 100 unit/mL injection flush 500 Units  As needed (PRN)         12/29/24 1443     Reason for No Pharmacological VTE Prophylaxis  Once        Question:  Reasons:  Answer:  Thrombocytopenia    12/26/24 1330     IP VTE HIGH RISK PATIENT  Once         12/26/24 1330     Place sequential compression device  Until discontinued         12/26/24 1330                    Disposition: tbd    Bj Freeman MD  Bone Marrow Transplant  [facility] - Oncology ([facility])

## 2025-01-04 NOTE — SUBJECTIVE & OBJECTIVE
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 3 loose stools noted overnight however overall improved. Transfusing 1u PLT and K and Mg. On PO steroid taper. Seen comfortably sleeping today    Objective:     Vital Signs (Most Recent):  Temp: 97.6 °F (36.4 °C) (01/04/25 1203)  Pulse: 67 (01/04/25 1203)  Resp: 20 (01/04/25 1203)  BP: 126/77 (01/04/25 1203)  SpO2: (!) 94 % (01/04/25 1203) Vital Signs (24h Range):  Temp:  [97.6 °F (36.4 °C)-98.2 °F (36.8 °C)] 97.6 °F (36.4 °C)  Pulse:  [67-82] 67  Resp:  [16-20] 20  SpO2:  [93 %-95 %] 94 %  BP: (102-166)/() 126/77     Weight: 67.1 kg (148 lb 0.6 oz)  Body mass index is 21.86 kg/m².  Body surface area is 1.81 meters squared.    ECOG SCORE           [unfilled]    Intake/Output - Last 3 Shifts         01/02 0700  01/03 0659 01/03 0700  01/04 0659 01/04 0700  01/05 0659    P.O. 990 240 200    I.V. (mL/kg) 1553.4 (22.5) 252.3 (3.8)     Blood   175    IV Piggyback 0      Total Intake(mL/kg) 2543.4 (36.9) 492.3 (7.3) 375 (5.6)    Urine (mL/kg/hr) 500 (0.3) 550 (0.3)     Emesis/NG output  0     Stool  0     Total Output 500 550     Net +2043.4 -57.7 +375           Urine Occurrence 2 x 3 x 1 x    Stool Occurrence  4 x 1 x    Emesis Occurrence  1 x              Physical Exam  Vitals and nursing note reviewed.   Constitutional:       Appearance: Normal appearance. He is well-developed.   HENT:      Head: Normocephalic and atraumatic.      Right Ear: External ear normal.      Left Ear: External ear normal.      Mouth/Throat:      Mouth: Mucous membranes are dry.      Pharynx: No oropharyngeal exudate.   Eyes:      Extraocular Movements: Extraocular movements intact.      Conjunctiva/sclera: Conjunctivae normal.   Cardiovascular:      Rate and Rhythm: Normal rate and regular rhythm.      Pulses: Normal pulses.      Heart sounds: Normal heart sounds. No murmur heard.  Pulmonary:      Effort: Pulmonary effort is normal.  No respiratory distress.      Breath sounds: Normal breath sounds. No stridor. No wheezing or rales.   Abdominal:      General: Bowel sounds are normal.      Palpations: Abdomen is soft.      Tenderness: There is no abdominal tenderness.   Musculoskeletal:         General: Normal range of motion.      Cervical back: Normal range of motion and neck supple.      Right lower leg: No edema.      Left lower leg: No edema.   Skin:     General: Skin is warm and dry.      Findings: Bruising present. No erythema or rash.      Comments: Right triple lumen vaughn clean and dry with no signs of infection   Neurological:      General: No focal deficit present.      Mental Status: He is alert and oriented to person, place, and time.      Motor: Weakness present.   Psychiatric:         Mood and Affect: Mood normal.         Behavior: Behavior normal.         Thought Content: Thought content normal.         Judgment: Judgment normal.            Significant Labs:   All pertinent labs from the last 24 hours have been reviewed.    Diagnostic Results:  I have reviewed all pertinent imaging results/findings within the past 24 hours.

## 2025-01-04 NOTE — ASSESSMENT & PLAN NOTE
Status post haploidentical stem cell transplantation conditioned with FluMel 100 + PTCy+ 2Gy TBI . Currently Day+ 106. Engrafted on 10/09/24 day +20.  Day 30 bone marrow (11/5/2024) showing mildly hypercellular marrow with no increased blast (0.3%) and no evidence of myeloid neoplasm.   Day 100 bone marrow biopsy completed 12/30/24  Plan for central line removal in January with gen surg     - continue ppx acyclovir, posaconazole, and atovaquone  - level 1/3 is 5.4; tacro 0.5 mg increased to bid   - started 2mg/kg/day methylpred on 12/30 for possible GVHD of the GI tract and GI consulted for flex sig and EGD. Completed 4 days of methylpred on 1/2. Diarrhea resolved, deferring flex sig and EGD, GI has signed off.  - daily acute and chronic (starting 21/28 at Day +100) GVHD charting while inpatient  - monitor weekly EBV/CMV; last EBV on 12/26 is 18156, EBV drawn 1/2 is 63619; last CMV same date undetected  - continue cmv ppx with letermovir (patient currently does not have, medication has been shipped)

## 2025-01-04 NOTE — ASSESSMENT & PLAN NOTE
- Due to underlying disease and chemotherapy/transplant  - Transfuse for Hgb <7 g/dL and platelets <10k.   - Folate and copper deficient, on supplementation.   - Promacta sent in on 12/9/24. [employer] approved for financial assistance.   - Changed TMP/SMX to atovaquone.   - continue ppx antimicrobials as above  - elevated ferritin, elevated IL2 highly suspicious for HLH, BM bx with no evidence of HLH, dysplasia not suspected to be related to MDS, genetics and chimerisms pending.  - Received Rituxan on 12/29. Completed 4 days of Methylpred on 1/2.

## 2025-01-05 LAB
ALBUMIN SERPL BCP-MCNC: 2.7 G/DL (ref 3.5–5.2)
ALP SERPL-CCNC: 79 U/L (ref 40–150)
ALT SERPL W/O P-5'-P-CCNC: 84 U/L (ref 10–44)
ANION GAP SERPL CALC-SCNC: 8 MMOL/L (ref 8–16)
ANISOCYTOSIS BLD QL SMEAR: SLIGHT
AST SERPL-CCNC: 54 U/L (ref 10–40)
BASOPHILS # BLD AUTO: 0.01 K/UL (ref 0–0.2)
BASOPHILS NFR BLD: 0.5 % (ref 0–1.9)
BILIRUB SERPL-MCNC: 1.1 MG/DL (ref 0.1–1)
BUN SERPL-MCNC: 17 MG/DL (ref 8–23)
CALCIUM SERPL-MCNC: 8.5 MG/DL (ref 8.7–10.5)
CHLORIDE SERPL-SCNC: 99 MMOL/L (ref 95–110)
CO2 SERPL-SCNC: 30 MMOL/L (ref 23–29)
CREAT SERPL-MCNC: 0.8 MG/DL (ref 0.5–1.4)
DIFFERENTIAL METHOD BLD: ABNORMAL
EOSINOPHIL # BLD AUTO: 0 K/UL (ref 0–0.5)
EOSINOPHIL NFR BLD: 0 % (ref 0–8)
ERYTHROCYTE [DISTWIDTH] IN BLOOD BY AUTOMATED COUNT: 15.1 % (ref 11.5–14.5)
EST. GFR  (NO RACE VARIABLE): >60 ML/MIN/1.73 M^2
FERRITIN SERPL-MCNC: ABNORMAL NG/ML (ref 20–300)
GLUCOSE SERPL-MCNC: 191 MG/DL (ref 70–110)
HCT VFR BLD AUTO: 23 % (ref 40–54)
HGB BLD-MCNC: 8 G/DL (ref 14–18)
HYPOCHROMIA BLD QL SMEAR: ABNORMAL
IMM GRANULOCYTES # BLD AUTO: 0.03 K/UL (ref 0–0.04)
IMM GRANULOCYTES NFR BLD AUTO: 1.4 % (ref 0–0.5)
LYMPHOCYTES # BLD AUTO: 1.1 K/UL (ref 1–4.8)
LYMPHOCYTES NFR BLD: 52.1 % (ref 18–48)
MAGNESIUM SERPL-MCNC: 1.6 MG/DL (ref 1.6–2.6)
MCH RBC QN AUTO: 37.2 PG (ref 27–31)
MCHC RBC AUTO-ENTMCNC: 34.8 G/DL (ref 32–36)
MCV RBC AUTO: 107 FL (ref 82–98)
MONOCYTES # BLD AUTO: 0.1 K/UL (ref 0.3–1)
MONOCYTES NFR BLD: 5.9 % (ref 4–15)
NEUTROPHILS # BLD AUTO: 0.9 K/UL (ref 1.8–7.7)
NEUTROPHILS NFR BLD: 40.1 % (ref 38–73)
NRBC BLD-RTO: 6 /100 WBC
OVALOCYTES BLD QL SMEAR: ABNORMAL
PHOSPHATE SERPL-MCNC: 2.2 MG/DL (ref 2.7–4.5)
PLATELET # BLD AUTO: 12 K/UL (ref 150–450)
PMV BLD AUTO: ABNORMAL FL (ref 9.2–12.9)
POIKILOCYTOSIS BLD QL SMEAR: SLIGHT
POLYCHROMASIA BLD QL SMEAR: ABNORMAL
POTASSIUM SERPL-SCNC: 3.7 MMOL/L (ref 3.5–5.1)
PROT SERPL-MCNC: 5.1 G/DL (ref 6–8.4)
RBC # BLD AUTO: 2.15 M/UL (ref 4.6–6.2)
SODIUM SERPL-SCNC: 137 MMOL/L (ref 136–145)
SPHEROCYTES BLD QL SMEAR: ABNORMAL
WBC # BLD AUTO: 2.19 K/UL (ref 3.9–12.7)

## 2025-01-05 PROCEDURE — 63600175 PHARM REV CODE 636 W HCPCS: Performed by: INTERNAL MEDICINE

## 2025-01-05 PROCEDURE — 25000003 PHARM REV CODE 250: Performed by: NURSE PRACTITIONER

## 2025-01-05 PROCEDURE — 84100 ASSAY OF PHOSPHORUS: CPT | Performed by: INTERNAL MEDICINE

## 2025-01-05 PROCEDURE — 63600175 PHARM REV CODE 636 W HCPCS: Performed by: NURSE PRACTITIONER

## 2025-01-05 PROCEDURE — 82728 ASSAY OF FERRITIN: CPT | Performed by: NURSE PRACTITIONER

## 2025-01-05 PROCEDURE — 25000003 PHARM REV CODE 250: Performed by: INTERNAL MEDICINE

## 2025-01-05 PROCEDURE — 99232 SBSQ HOSP IP/OBS MODERATE 35: CPT | Mod: GC,,, | Performed by: INTERNAL MEDICINE

## 2025-01-05 PROCEDURE — 80053 COMPREHEN METABOLIC PANEL: CPT | Performed by: INTERNAL MEDICINE

## 2025-01-05 PROCEDURE — 20600001 HC STEP DOWN PRIVATE ROOM

## 2025-01-05 PROCEDURE — 85025 COMPLETE CBC W/AUTO DIFF WBC: CPT | Performed by: INTERNAL MEDICINE

## 2025-01-05 PROCEDURE — 94761 N-INVAS EAR/PLS OXIMETRY MLT: CPT

## 2025-01-05 PROCEDURE — 83735 ASSAY OF MAGNESIUM: CPT | Performed by: INTERNAL MEDICINE

## 2025-01-05 RX ADMIN — CARVEDILOL 6.25 MG: 6.25 TABLET, FILM COATED ORAL at 09:01

## 2025-01-05 RX ADMIN — POTASSIUM CHLORIDE 20 MEQ: 1500 TABLET, EXTENDED RELEASE ORAL at 12:01

## 2025-01-05 RX ADMIN — LEVOFLOXACIN 500 MG: 500 TABLET, FILM COATED ORAL at 09:01

## 2025-01-05 RX ADMIN — ATOVAQUONE 1500 MG: 750 SUSPENSION ORAL at 09:01

## 2025-01-05 RX ADMIN — GABAPENTIN 600 MG: 300 CAPSULE ORAL at 08:01

## 2025-01-05 RX ADMIN — Medication 1 TABLET: at 08:01

## 2025-01-05 RX ADMIN — Medication 1 TABLET: at 09:01

## 2025-01-05 RX ADMIN — Medication 1 TABLET: at 12:01

## 2025-01-05 RX ADMIN — PREDNISONE 60 MG: 20 TABLET ORAL at 09:01

## 2025-01-05 RX ADMIN — ROPINIROLE HYDROCHLORIDE 0.5 MG: 0.25 TABLET, FILM COATED ORAL at 08:01

## 2025-01-05 RX ADMIN — ZOLPIDEM TARTRATE 5 MG: 5 TABLET, FILM COATED ORAL at 10:01

## 2025-01-05 RX ADMIN — CARVEDILOL 6.25 MG: 6.25 TABLET, FILM COATED ORAL at 08:01

## 2025-01-05 RX ADMIN — PANTOPRAZOLE SODIUM 40 MG: 40 TABLET, DELAYED RELEASE ORAL at 09:01

## 2025-01-05 RX ADMIN — ACYCLOVIR 800 MG: 800 TABLET ORAL at 08:01

## 2025-01-05 RX ADMIN — FOLIC ACID 1 MG: 1 TABLET ORAL at 09:01

## 2025-01-05 RX ADMIN — ACYCLOVIR 800 MG: 800 TABLET ORAL at 09:01

## 2025-01-05 RX ADMIN — DICYCLOMINE HYDROCHLORIDE 10 MG: 10 CAPSULE ORAL at 08:01

## 2025-01-05 RX ADMIN — Medication 2 MG: at 09:01

## 2025-01-05 RX ADMIN — TACROLIMUS 0.5 MG: 0.5 CAPSULE ORAL at 06:01

## 2025-01-05 RX ADMIN — Medication 1 TABLET: at 04:01

## 2025-01-05 RX ADMIN — POSACONAZOLE 300 MG: 100 TABLET, DELAYED RELEASE ORAL at 09:01

## 2025-01-05 RX ADMIN — TACROLIMUS 0.5 MG: 0.5 CAPSULE ORAL at 09:01

## 2025-01-05 NOTE — ASSESSMENT & PLAN NOTE
- Pancytopenia in the setting of uptrending EBV. Last EBV level from 12/26 was 24K. Trending weekly.  - Ferritin stable at 26K today. Continue daily levels.  - IL 2 elevated at 2952  - Received Rituxan on 12/29  - Bone marrow biopsy completed 12/30, results pending   - IV Methylpred 2mg/kg/day started 12/30. Completed 4 days and now on oral pred taper

## 2025-01-05 NOTE — PLAN OF CARE
Pt. with nonskid footwear on with bed in lowest position and locked with bed rails up x2, with bed alarm being utilized.  Pt. instructed to call prior to getting OOB.  Pt. with call light within reach and verbalized understanding.  Pt. received electrolyte replacements PRN today.  Pt. with improved diarrhea, stool loose with substance.  Pt. With family at bedside.  Will continue to monitor pt.

## 2025-01-05 NOTE — PROGRESS NOTES
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 No BM overnight though did have two non diarrheic stools this am. Feels well. Likely dc on 1/6    Objective:     Vital Signs (Most Recent):  Temp: 97.2 °F (36.2 °C) (01/05/25 1458)  Pulse: 70 (01/05/25 1458)  Resp: 20 (01/05/25 1458)  BP: (!) 151/88 (01/05/25 1458)  SpO2: 96 % (01/05/25 1458) Vital Signs (24h Range):  Temp:  [97.2 °F (36.2 °C)-98.2 °F (36.8 °C)] 97.2 °F (36.2 °C)  Pulse:  [67-77] 70  Resp:  [16-20] 20  SpO2:  [93 %-98 %] 96 %  BP: (126-164)/(70-88) 151/88     Weight: 57.8 kg (127 lb 6.8 oz)  Body mass index is 18.82 kg/m².  Body surface area is 1.68 meters squared.    ECOG SCORE           [unfilled]    Intake/Output - Last 3 Shifts         01/03 0700  01/04 0659 01/04 0700 01/05 0659 01/05 0700 01/06 0659    P.O. 240 1240 240    I.V. (mL/kg) 252.3 (3.8) 47.9 (0.8)     Blood  175     IV Piggyback       Total Intake(mL/kg) 492.3 (7.3) 1462.9 (25.3) 240 (4.2)    Urine (mL/kg/hr) 550 (0.3) 460 (0.3)     Emesis/NG output 0      Stool 0 0     Total Output 550 460     Net -57.7 +1002.9 +240           Urine Occurrence 3 x 6 x     Stool Occurrence 4 x 3 x     Emesis Occurrence 1 x               Physical Exam  Vitals and nursing note reviewed.   Constitutional:       Appearance: Normal appearance. He is well-developed.   HENT:      Head: Normocephalic and atraumatic.      Right Ear: External ear normal.      Left Ear: External ear normal.      Mouth/Throat:      Mouth: Mucous membranes are dry.      Pharynx: No oropharyngeal exudate.   Eyes:      Extraocular Movements: Extraocular movements intact.      Conjunctiva/sclera: Conjunctivae normal.   Cardiovascular:      Rate and Rhythm: Normal rate and  regular rhythm.      Pulses: Normal pulses.      Heart sounds: Normal heart sounds. No murmur heard.  Pulmonary:      Effort: Pulmonary effort is normal. No respiratory distress.      Breath sounds: Normal breath sounds. No stridor. No wheezing or rales.   Abdominal:      General: Bowel sounds are normal.      Palpations: Abdomen is soft.      Tenderness: There is no abdominal tenderness.   Musculoskeletal:         General: Normal range of motion.      Cervical back: Normal range of motion and neck supple.      Right lower leg: No edema.      Left lower leg: No edema.   Skin:     General: Skin is warm and dry.      Findings: Bruising present. No erythema or rash.      Comments: Right triple lumen vaughn clean and dry with no signs of infection   Neurological:      General: No focal deficit present.      Mental Status: He is alert and oriented to person, place, and time.      Motor: Weakness present.   Psychiatric:         Mood and Affect: Mood normal.         Behavior: Behavior normal.         Thought Content: Thought content normal.         Judgment: Judgment normal.            Significant Labs:   All pertinent labs from the last 24 hours have been reviewed.    Diagnostic Results:  I have reviewed all pertinent imaging results/findings within the past 24 hours.  Assessment/Plan:     * Neutropenic fever  - afebrile since 12/30  - blood cultures x2 NGTD  - UA negative  - RIP/covid negative  - CXR with chronic LLL infiltrate; unable to determine severity  - CT c/a/p with new LLL consolidation, presumed infectious; trace pericholecystic fluid- edema vs. Cholecystitis  - given zosyn and vanc x1 in ED;   - completed 7 days of Zosyn, transitioned to ppx Levaquin  - Stopped IV and po Vanc 12/30  - Fever possible from HLH. 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 Last EBV level from 12/26 was 24K. Trending weekly.  - Ferritin stable at 26K today. Continue daily levels.  - IL 2 elevated at 2952  - Received Rituxan on 12/29  - Bone marrow biopsy completed 12/30, results pending   - IV Methylpred 2mg/kg/day started 12/30. 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 Will change Imodium to PRN   - GI has signed off    Folate deficiency  - Continue home folate    Copper deficiency  - Continue home copper    RLS (restless legs syndrome)  - Continue home ropinirole     Pancytopenia  - Due to underlying disease and chemotherapy/transplant  - Transfuse for Hgb <7 g/dL and platelets <10k.   - Folate and copper deficient, on supplementation.   - Promacta sent in on 12/9/24. [employer] approved for financial assistance.   - Changed TMP/SMX to atovaquone.   - continue ppx antimicrobials as above  - elevated ferritin, elevated IL2 highly suspicious for HLH, BM bx with no evidence of HLH, dysplasia not suspected to be related to MDS, genetics and chimerisms pending.  - Received Rituxan on 12/29. Completed 4 days of Methylpred on 1/2.    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 He started budesonide 3mg TID on 10/28/24. Due to persistent nausea despite budesonide so started systemic steroids 11/1 at 0.5 mg/kg and his steroid completed 12/8/24.  - see tacro dosing under allogeneic BMT  - diarrhea improved, GI consulted for flex sig and EGD however will defer for now given improvement in diarrhea with steroids and ATC antidiarrheals. GI has signed off.   -  methylpred 2mg/kg/day started 12/30. Completed 4 days and now on oral taper   - will change ATC Imodium to PRN    ADDISON (acute kidney injury)  - Likely prerenal due to decreased PO intake. Received 2L IVF in ED; on continuous IVF  RESOLVED    Insomnia  - Continue home Ambien    S/P allogeneic bone marrow transplant  Status post haploidentical stem cell transplantation conditioned with FluMel 100 + PTCy+ 2Gy TBI . Currently Day+ 106. Engrafted on 10/09/24 day +20.  Day 30 bone marrow (11/5/2024) showing mildly hypercellular marrow with no increased blast (0.3%) and no evidence of myeloid neoplasm.   Day 100 bone marrow biopsy completed 12/30/24  Plan for central line removal in January with gen surg     - continue ppx acyclovir, posaconazole, and atovaquone  - level 1/3 is 5.4; tacro 0.5 mg increased to bid   - started 2mg/kg/day methylpred on 12/30 for possible GVHD of the GI tract and GI consulted for flex sig and EGD. Completed 4 days of methylpred on 1/2. Diarrhea resolved, deferring flex sig and EGD, GI has signed off.  - daily acute and chronic (starting 21/28 at Day +100) GVHD charting while inpatient  - monitor weekly EBV/CMV; last EBV on 12/26 is 60537, EBV drawn 1/2 is 94377; last CMV same date undetected  - continue cmv ppx with letermovir (patient currently does not have, medication has been shipped)    Myelodysplastic syndrome  - Status post treatment with azacitidine 75 mg/m2 daily x7 days plus venetoclax 100mg (voriconazole) daily x14 of 28 days.Venetoclax decreased to 7 days with cycle 3 until completion of 11 cycles.   - S/p Haplo BMT as above.   - No plans for maintenance at this time.     Hypertension, essential  - cont Coreg    Physical debility  - PT/OT consulted on admit, recommending  PT/OT        VTE Risk Mitigation (From admission, onward)           Ordered     heparin, porcine (PF) 100 unit/mL injection flush 500 Units  As needed (PRN)         12/29/24 1443     Reason for No Pharmacological  VTE Prophylaxis  Once        Question:  Reasons:  Answer:  Thrombocytopenia    12/26/24 1330     IP VTE HIGH RISK PATIENT  Once         12/26/24 1330     Place sequential compression device  Until discontinued         12/26/24 1330                    Disposition: tbd    Bj Freeman MD  Bone Marrow Transplant  [facility] - [facility] ([facility])

## 2025-01-05 NOTE — SUBJECTIVE & OBJECTIVE
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 No BM overnight though did have two non diarrheic stools this am. Feels well. Likely dc on 1/6    Objective:     Vital Signs (Most Recent):  Temp: 97.2 °F (36.2 °C) (01/05/25 1458)  Pulse: 70 (01/05/25 1458)  Resp: 20 (01/05/25 1458)  BP: (!) 151/88 (01/05/25 1458)  SpO2: 96 % (01/05/25 1458) Vital Signs (24h Range):  Temp:  [97.2 °F (36.2 °C)-98.2 °F (36.8 °C)] 97.2 °F (36.2 °C)  Pulse:  [67-77] 70  Resp:  [16-20] 20  SpO2:  [93 %-98 %] 96 %  BP: (126-164)/(70-88) 151/88     Weight: 57.8 kg (127 lb 6.8 oz)  Body mass index is 18.82 kg/m².  Body surface area is 1.68 meters squared.    ECOG SCORE           [unfilled]    Intake/Output - Last 3 Shifts         01/03 0700  01/04 0659 01/04 0700 01/05 0659 01/05 0700  01/06 0659    P.O. 240 1240 240    I.V. (mL/kg) 252.3 (3.8) 47.9 (0.8)     Blood  175     IV Piggyback       Total Intake(mL/kg) 492.3 (7.3) 1462.9 (25.3) 240 (4.2)    Urine (mL/kg/hr) 550 (0.3) 460 (0.3)     Emesis/NG output 0      Stool 0 0     Total Output 550 460     Net -57.7 +1002.9 +240           Urine Occurrence 3 x 6 x     Stool Occurrence 4 x 3 x     Emesis Occurrence 1 x               Physical Exam  Vitals and nursing note reviewed.   Constitutional:       Appearance: Normal appearance. He is well-developed.   HENT:      Head: Normocephalic and atraumatic.      Right Ear: External ear normal.      Left Ear: External ear normal.      Mouth/Throat:      Mouth: Mucous membranes are dry.      Pharynx: No oropharyngeal exudate.   Eyes:      Extraocular Movements: Extraocular movements intact.      Conjunctiva/sclera: Conjunctivae normal.   Cardiovascular:      Rate and Rhythm: Normal rate and regular rhythm.      Pulses: Normal pulses.      Heart sounds: Normal heart sounds. No murmur heard.  Pulmonary:      Effort: Pulmonary effort is normal. No respiratory distress.      Breath sounds: Normal  breath sounds. No stridor. No wheezing or rales.   Abdominal:      General: Bowel sounds are normal.      Palpations: Abdomen is soft.      Tenderness: There is no abdominal tenderness.   Musculoskeletal:         General: Normal range of motion.      Cervical back: Normal range of motion and neck supple.      Right lower leg: No edema.      Left lower leg: No edema.   Skin:     General: Skin is warm and dry.      Findings: Bruising present. No erythema or rash.      Comments: Right triple lumen vaughn clean and dry with no signs of infection   Neurological:      General: No focal deficit present.      Mental Status: He is alert and oriented to person, place, and time.      Motor: Weakness present.   Psychiatric:         Mood and Affect: Mood normal.         Behavior: Behavior normal.         Thought Content: Thought content normal.         Judgment: Judgment normal.            Significant Labs:   All pertinent labs from the last 24 hours have been reviewed.    Diagnostic Results:  I have reviewed all pertinent imaging results/findings within the past 24 hours.

## 2025-01-05 NOTE — PLAN OF CARE
Problem: Adult Inpatient Plan of Care  Goal: Plan of Care Review  Outcome: Progressing  Goal: Patient-Specific Goal (Individualized)  Outcome: Progressing  Goal: Absence of Hospital-Acquired Illness or Injury  Outcome: Progressing  Goal: Optimal Comfort and Wellbeing  Outcome: Progressing  Goal: Readiness for Transition of Care  Outcome: Progressing     Problem: Acute Kidney Injury/Impairment  Goal: Fluid and Electrolyte Balance  Outcome: Progressing  Goal: Improved Oral Intake  Outcome: Progressing  Goal: Effective Renal Function  Outcome: Progressing     Problem: Infection  Goal: Absence of Infection Signs and Symptoms  Outcome: Progressing     Problem: Fall Injury Risk  Goal: Absence of Fall and Fall-Related Injury  Outcome: Progressing     Problem: Wound  Goal: Optimal Coping  Outcome: Progressing  Goal: Optimal Functional Ability  Outcome: Progressing  Goal: Absence of Infection Signs and Symptoms  Outcome: Progressing  Goal: Improved Oral Intake  Outcome: Progressing  Goal: Optimal Pain Control and Function  Outcome: Progressing  Goal: Skin Health and Integrity  Outcome: Progressing  Goal: Optimal Wound Healing  Outcome: Progressing

## 2025-01-06 VITALS
HEART RATE: 72 BPM | OXYGEN SATURATION: 96 % | TEMPERATURE: 97 F | RESPIRATION RATE: 16 BRPM | SYSTOLIC BLOOD PRESSURE: 162 MMHG | DIASTOLIC BLOOD PRESSURE: 84 MMHG | BODY MASS INDEX: 21.25 KG/M2 | HEIGHT: 69 IN | WEIGHT: 143.5 LBS

## 2025-01-06 LAB
ALBUMIN SERPL BCP-MCNC: 2.5 G/DL (ref 3.5–5.2)
ALP SERPL-CCNC: 106 U/L (ref 40–150)
ALT SERPL W/O P-5'-P-CCNC: 91 U/L (ref 10–44)
ANION GAP SERPL CALC-SCNC: 8 MMOL/L (ref 8–16)
ANISOCYTOSIS BLD QL SMEAR: SLIGHT
AST SERPL-CCNC: 54 U/L (ref 10–40)
BASO STIPL BLD QL SMEAR: ABNORMAL
BASOPHILS # BLD AUTO: 0.01 K/UL (ref 0–0.2)
BASOPHILS NFR BLD: 0.4 % (ref 0–1.9)
BILIRUB SERPL-MCNC: 0.9 MG/DL (ref 0.1–1)
BLD PROD TYP BPU: NORMAL
BLD PROD TYP BPU: NORMAL
BLOOD UNIT EXPIRATION DATE: NORMAL
BLOOD UNIT EXPIRATION DATE: NORMAL
BLOOD UNIT TYPE CODE: 5100
BLOOD UNIT TYPE CODE: 7300
BLOOD UNIT TYPE: NORMAL
BLOOD UNIT TYPE: NORMAL
BUN SERPL-MCNC: 18 MG/DL (ref 8–23)
CALCIUM SERPL-MCNC: 8.2 MG/DL (ref 8.7–10.5)
CHLORIDE SERPL-SCNC: 98 MMOL/L (ref 95–110)
CHROM BANDING METHOD: NORMAL
CHROMOSOME ANALYSIS BM ADDITIONAL INFORMATION: NORMAL
CHROMOSOME ANALYSIS BM RELEASED BY: NORMAL
CHROMOSOME ANALYSIS BM RESULT SUMMARY: NORMAL
CLINICAL CYTOGENETICIST REVIEW: NORMAL
CO2 SERPL-SCNC: 31 MMOL/L (ref 23–29)
CODING SYSTEM: NORMAL
CODING SYSTEM: NORMAL
CREAT SERPL-MCNC: 0.8 MG/DL (ref 0.5–1.4)
CROSSMATCH INTERPRETATION: NORMAL
CROSSMATCH INTERPRETATION: NORMAL
DIFFERENTIAL METHOD BLD: ABNORMAL
DISPENSE STATUS: NORMAL
DISPENSE STATUS: NORMAL
EOSINOPHIL # BLD AUTO: 0 K/UL (ref 0–0.5)
EOSINOPHIL NFR BLD: 0 % (ref 0–8)
ERYTHROCYTE [DISTWIDTH] IN BLOOD BY AUTOMATED COUNT: 15.4 % (ref 11.5–14.5)
EST. GFR  (NO RACE VARIABLE): >60 ML/MIN/1.73 M^2
FERRITIN SERPL-MCNC: ABNORMAL NG/ML (ref 20–300)
GLUCOSE SERPL-MCNC: 229 MG/DL (ref 70–110)
HCT VFR BLD AUTO: 21.1 % (ref 40–54)
HGB BLD-MCNC: 7.4 G/DL (ref 14–18)
HYPOCHROMIA BLD QL SMEAR: ABNORMAL
IMM GRANULOCYTES # BLD AUTO: 0.05 K/UL (ref 0–0.04)
IMM GRANULOCYTES NFR BLD AUTO: 1.9 % (ref 0–0.5)
KARYOTYP MAR: NORMAL
LYMPHOCYTES # BLD AUTO: 1.4 K/UL (ref 1–4.8)
LYMPHOCYTES NFR BLD: 55.2 % (ref 18–48)
MAGNESIUM SERPL-MCNC: 1.3 MG/DL (ref 1.6–2.6)
MCH RBC QN AUTO: 37 PG (ref 27–31)
MCHC RBC AUTO-ENTMCNC: 35.1 G/DL (ref 32–36)
MCV RBC AUTO: 106 FL (ref 82–98)
MONOCYTES # BLD AUTO: 0.3 K/UL (ref 0.3–1)
MONOCYTES NFR BLD: 10 % (ref 4–15)
NEUTROPHILS # BLD AUTO: 0.9 K/UL (ref 1.8–7.7)
NEUTROPHILS NFR BLD: 32.5 % (ref 38–73)
NRBC BLD-RTO: 5 /100 WBC
NUM UNITS TRANS PACKED RBC: NORMAL
OVALOCYTES BLD QL SMEAR: ABNORMAL
PHOSPHATE SERPL-MCNC: 2.7 MG/DL (ref 2.7–4.5)
PLATELET # BLD AUTO: 8 K/UL (ref 150–450)
PLATELET BLD QL SMEAR: ABNORMAL
PMV BLD AUTO: ABNORMAL FL (ref 9.2–12.9)
POIKILOCYTOSIS BLD QL SMEAR: SLIGHT
POLYCHROMASIA BLD QL SMEAR: ABNORMAL
POTASSIUM SERPL-SCNC: 3.5 MMOL/L (ref 3.5–5.1)
PROT SERPL-MCNC: 4.8 G/DL (ref 6–8.4)
RBC # BLD AUTO: 2 M/UL (ref 4.6–6.2)
REASON FOR REFERRAL (NARRATIVE): NORMAL
REF LAB TEST METHOD: NORMAL
SODIUM SERPL-SCNC: 137 MMOL/L (ref 136–145)
SPECIMEN SOURCE: NORMAL
SPECIMEN: NORMAL
SPHEROCYTES BLD QL SMEAR: ABNORMAL
TACROLIMUS BLD-MCNC: 8.3 NG/ML (ref 5–15)
UNIT NUMBER: NORMAL
WBC # BLD AUTO: 2.61 K/UL (ref 3.9–12.7)

## 2025-01-06 PROCEDURE — 99239 HOSP IP/OBS DSCHRG MGMT >30: CPT | Mod: ,,, | Performed by: INTERNAL MEDICINE

## 2025-01-06 PROCEDURE — 02PYX3Z REMOVAL OF INFUSION DEVICE FROM GREAT VESSEL, EXTERNAL APPROACH: ICD-10-PCS | Performed by: SURGERY

## 2025-01-06 PROCEDURE — 25000003 PHARM REV CODE 250: Performed by: INTERNAL MEDICINE

## 2025-01-06 PROCEDURE — 63600175 PHARM REV CODE 636 W HCPCS: Performed by: NURSE PRACTITIONER

## 2025-01-06 PROCEDURE — 63600175 PHARM REV CODE 636 W HCPCS

## 2025-01-06 PROCEDURE — 25000003 PHARM REV CODE 250: Performed by: NURSE PRACTITIONER

## 2025-01-06 PROCEDURE — 30233N1 TRANSFUSION OF NONAUTOLOGOUS RED BLOOD CELLS INTO PERIPHERAL VEIN, PERCUTANEOUS APPROACH: ICD-10-PCS | Performed by: INTERNAL MEDICINE

## 2025-01-06 PROCEDURE — 63600175 PHARM REV CODE 636 W HCPCS: Performed by: INTERNAL MEDICINE

## 2025-01-06 PROCEDURE — 85025 COMPLETE CBC W/AUTO DIFF WBC: CPT | Performed by: INTERNAL MEDICINE

## 2025-01-06 PROCEDURE — 80053 COMPREHEN METABOLIC PANEL: CPT | Performed by: INTERNAL MEDICINE

## 2025-01-06 PROCEDURE — P9037 PLATE PHERES LEUKOREDU IRRAD: HCPCS | Performed by: STUDENT IN AN ORGANIZED HEALTH CARE EDUCATION/TRAINING PROGRAM

## 2025-01-06 PROCEDURE — 86920 COMPATIBILITY TEST SPIN: CPT | Performed by: NURSE PRACTITIONER

## 2025-01-06 PROCEDURE — 83735 ASSAY OF MAGNESIUM: CPT | Performed by: INTERNAL MEDICINE

## 2025-01-06 PROCEDURE — P9040 RBC LEUKOREDUCED IRRADIATED: HCPCS | Performed by: NURSE PRACTITIONER

## 2025-01-06 PROCEDURE — 80197 ASSAY OF TACROLIMUS: CPT | Performed by: NURSE PRACTITIONER

## 2025-01-06 PROCEDURE — 84100 ASSAY OF PHOSPHORUS: CPT | Performed by: INTERNAL MEDICINE

## 2025-01-06 PROCEDURE — 36430 TRANSFUSION BLD/BLD COMPNT: CPT

## 2025-01-06 PROCEDURE — 82728 ASSAY OF FERRITIN: CPT | Performed by: NURSE PRACTITIONER

## 2025-01-06 PROCEDURE — 63600175 PHARM REV CODE 636 W HCPCS: Performed by: STUDENT IN AN ORGANIZED HEALTH CARE EDUCATION/TRAINING PROGRAM

## 2025-01-06 RX ORDER — TACROLIMUS 0.5 MG/1
CAPSULE ORAL
Qty: 60 CAPSULE | Refills: 5 | Status: SHIPPED | OUTPATIENT
Start: 2025-01-06 | End: 2025-01-16

## 2025-01-06 RX ORDER — PREDNISONE 10 MG/1
TABLET ORAL
Qty: 32 TABLET | Refills: 0 | Status: SHIPPED | OUTPATIENT
Start: 2025-01-06

## 2025-01-06 RX ORDER — MAGNESIUM SULFATE HEPTAHYDRATE 40 MG/ML
2 INJECTION, SOLUTION INTRAVENOUS
Status: DISCONTINUED | OUTPATIENT
Start: 2025-01-07 | End: 2025-01-06 | Stop reason: HOSPADM

## 2025-01-06 RX ORDER — MAGNESIUM SULFATE HEPTAHYDRATE 40 MG/ML
4 INJECTION, SOLUTION INTRAVENOUS ONCE
Status: COMPLETED | OUTPATIENT
Start: 2025-01-06 | End: 2025-01-06

## 2025-01-06 RX ORDER — LEVOFLOXACIN 500 MG/1
500 TABLET, FILM COATED ORAL DAILY
Qty: 30 TABLET | Refills: 5 | Status: SHIPPED | OUTPATIENT
Start: 2025-01-06

## 2025-01-06 RX ORDER — HYDROCODONE BITARTRATE AND ACETAMINOPHEN 500; 5 MG/1; MG/1
TABLET ORAL
Status: DISCONTINUED | OUTPATIENT
Start: 2025-01-06 | End: 2025-01-06 | Stop reason: HOSPADM

## 2025-01-06 RX ORDER — LIDOCAINE HYDROCHLORIDE 10 MG/ML
10 INJECTION, SOLUTION INFILTRATION; PERINEURAL ONCE
Status: COMPLETED | OUTPATIENT
Start: 2025-01-06 | End: 2025-01-06

## 2025-01-06 RX ORDER — ACETAMINOPHEN 325 MG/1
650 TABLET ORAL EVERY 6 HOURS PRN
Status: DISCONTINUED | OUTPATIENT
Start: 2025-01-06 | End: 2025-01-06 | Stop reason: HOSPADM

## 2025-01-06 RX ADMIN — FOLIC ACID 1 MG: 1 TABLET ORAL at 09:01

## 2025-01-06 RX ADMIN — LIDOCAINE HYDROCHLORIDE 10 ML: 10 INJECTION, SOLUTION INFILTRATION; PERINEURAL at 03:01

## 2025-01-06 RX ADMIN — POTASSIUM CHLORIDE 20 MEQ: 1500 TABLET, EXTENDED RELEASE ORAL at 05:01

## 2025-01-06 RX ADMIN — LOPERAMIDE HYDROCHLORIDE 2 MG: 2 CAPSULE ORAL at 10:01

## 2025-01-06 RX ADMIN — CARVEDILOL 6.25 MG: 6.25 TABLET, FILM COATED ORAL at 09:01

## 2025-01-06 RX ADMIN — MAGNESIUM SULFATE HEPTAHYDRATE 2 G: 40 INJECTION, SOLUTION INTRAVENOUS at 09:01

## 2025-01-06 RX ADMIN — DIPHENHYDRAMINE HYDROCHLORIDE 25 MG: 50 INJECTION, SOLUTION INTRAMUSCULAR; INTRAVENOUS at 08:01

## 2025-01-06 RX ADMIN — POTASSIUM CHLORIDE 20 MEQ: 1500 TABLET, EXTENDED RELEASE ORAL at 09:01

## 2025-01-06 RX ADMIN — TACROLIMUS 0.5 MG: 0.5 CAPSULE ORAL at 05:01

## 2025-01-06 RX ADMIN — POSACONAZOLE 300 MG: 100 TABLET, DELAYED RELEASE ORAL at 09:01

## 2025-01-06 RX ADMIN — ATOVAQUONE 1500 MG: 750 SUSPENSION ORAL at 09:01

## 2025-01-06 RX ADMIN — ACYCLOVIR 800 MG: 800 TABLET ORAL at 09:01

## 2025-01-06 RX ADMIN — PREDNISONE 60 MG: 20 TABLET ORAL at 09:01

## 2025-01-06 RX ADMIN — Medication 2 MG: at 09:01

## 2025-01-06 RX ADMIN — PANTOPRAZOLE SODIUM 40 MG: 40 TABLET, DELAYED RELEASE ORAL at 09:01

## 2025-01-06 RX ADMIN — ACETAMINOPHEN 650 MG: 325 TABLET ORAL at 09:01

## 2025-01-06 RX ADMIN — TACROLIMUS 0.5 MG: 0.5 CAPSULE ORAL at 08:01

## 2025-01-06 RX ADMIN — LEVOFLOXACIN 500 MG: 500 TABLET, FILM COATED ORAL at 09:01

## 2025-01-06 NOTE — ASSESSMENT & PLAN NOTE
Malnutrition Type:  Context: chronic illness  Level: moderate    Related to (etiology):   Metabolic demand of disease and treatment    Signs and Symptoms (as evidenced by):   Muscle/fat wasting, significant weight loss     Malnutrition Characteristic Summary:  Weight Loss (Malnutrition): greater than 10% in 6 months (11.7% weight loss x 6 months)  Subcutaneous Fat (Malnutrition): moderate depletion  Muscle Mass (Malnutrition): moderate depletion      Interventions/Recommendations (treatment strategy):  Collaboration of nutrition care with other providers    Nutrition Diagnosis Status:   New

## 2025-01-06 NOTE — PROGRESS NOTES
BMT Pharmacist Medication Review Note     All current medications were reviewed with the patient and wife.       We discussed the changes made to medications:   - Restart levofloxacin for bacterial coverage while neutropenic.    - Tacrolimus now at 1 capsule (0.5mg) twice a day.    - Prednisone taper in chart.     Daughter will call pharmacy to update on shipment of Prevymis and Promacta.       Medicamentos/Medications Indicación/Indication Mañana/Morning Tarde/Afternoon Noche/Night   Acyclovir 800mg  Prevención de infecciones virales  Viral infection prevention 1 tableta  1 tableta   **Levofloxacin 500mg Prevencón de infecciones bacteria  Bacterial infection prevention 1 tableta     Letermovir (Prevymis®) 480mg Prevención de infección por CMV  CMV infection prevention 1 tableta     Posaconazole 100mg Prevencón de infecciones fungales  Fungal infection prevention 3 tabletas     **Atovaquone (Mepron) 750mg/5mL Fungal pneumonia prevention 10mL        **Tacrolimus 0.5mg Prevención de GVHD - NO tome la dosis de por la mañana en días que se relizará laboratorios  1 cápsula  1 cápsula   **Prednisone 10mg GVHD 1/7 - 1/10 Prednisone 4 tablets (40mg) once a day   1/11 - 1/14 Prednisone 2 tablets (20mg) once a day   1/15 - 1/18 Prednisone 1 tablet (10mg) once a day   1/19 - 1/23 Prednisone ½ tablet (0.5mg) once a day   1/24 - 1/27 Prednisone ½ tablet (0.5mg) every other day   1/28 STOP      Eltrombopag (Promacta) 50 mg plaquetas 1 tableta     Magnesium oxide 400mg Suplemento de magnesio 2 tabletas  2 tabletas      Ropinirole (Requip) 0.5 mg pierna inquieta   1 tableta   Pantoprazole Stomach acid 1 tableta     Carvedilol (Coreg®) 6.25 mg Presión arterial maeve  1 tableta  1 tableta   Gabapentin (Neurontin®) 300 mg Neuropatía   2 cápsulas   Copper Gluconate 2 mg Suplemento 1 cápsula     Folic Acid 1 mg Suplemento 1 tableta     **medicamento nuevo o cambios realizados al medicamento    MEDICAMENTOS CUANDO VI NECESARIOS/AS  NEEDED MEDICATIONS:                                                                    Loperamide 2mg cada 6 horas según sea necesario para la diarrea  Zolpidem (Ambien) 5 mg tableta todas las noches según sea necesario para dormir     Hold until told to restart: Cilostazol        All questions were answered.      Sanjuana Poe, Pharm.D., BCOP  Clinical Pharmacy Specialist, Bone Marrow Transplant/Cellular Therapy  [facility]  SpectraLink: [phone]

## 2025-01-06 NOTE — PLAN OF CARE
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]  Not Applicable     Priority: 2     Myelodysplastic syndrome [D46.9]  Yes     Priority: 3     Pancytopenia [D61.818]  Yes     Priority: 4     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]  Not Applicable     Priority: 5     Diarrhea [R19.7]  Yes    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]  Yes    Hypokalemia [E87.6]  Yes    Hypophosphatemia [E83.39]  Yes    Copper deficiency [E61.0]  Yes    Folate deficiency [E53.8]  Yes    RLS (restless legs syndrome) [G25.81]  Yes    ADDISON (acute kidney injury) [N17.9]  Yes    Insomnia [G47.00]  Yes    Hypertension, essential [I10]  Yes    Physical debility [R53.81]  Yes      Resolved Hospital Problems    Diagnosis Date Resolved POA    Pancytopenia due to antineoplastic chemotherapy [D61.810, T45.1X5A] 12/27/2024 Yes     Priority: 3     Altered mental status [R41.82] 01/04/2025 Yes    Hypotension [I95.9] 01/04/2025 Yes    Immunosuppression [D84.9] 12/27/2024 Yes       Follow Up Appointments:  Future Appointments   Date Time Provider Department Center   1/9/2025 11:00 AM [facility] PET CT1 LIMIT 500 LBS [facility] PET CT [facility]   1/10/2025 11:00 AM Gilma Ugalde PA-C [facility] BMT [facility]   1/14/2025  1:00 PM Elvira Joyce, PT [facility] OP [facility]   1/16/2025  8:00 AM INJECTION, [facility] INFUSION [facility]   1/16/2025  8:30 AM Mohit Hickey MD [facility] BMT [facility]   1/17/2025  1:00 PM Elvira Joyce, PT [facility] OP [facility]    1/21/2025  1:00 PM Julian Morrow, PTA [facility] OP [facility]   1/23/2025  8:15 AM INJECTION, [facility] INFUSION [facility]   1/23/2025  9:00 AM Mohit Hickey MD [facility] BMT [facility]   1/24/2025  1:00 PM Elvira Joyce, PT [facility] OP RHB [place] [facility]   1/28/2025  1:00 PM Julian Morrow, PTA [facility]   1/31/2025  8:15 AM INJECTION, [facility] INFUSION [facility]   1/31/2025  9:00 AM Gilma Ugalde PA-C [facility] BMT [facility]   1/31/2025  1:00 PM Elvira Joyce, PT [facility] OP RHB [facility]   2/27/2025 11:00 AM Elvira Maria MD [facility]       Allergies:Review of patient's allergies indicates:  No Known Allergies    Medications: Review discharge medications with patient and family and provide education.    Current Facility-Administered Medications   Medication Dose Route Frequency Provider Last Rate Last Admin    0.9%  NaCl infusion (for blood administration)   Intravenous Q24H PRN Bj Freeman MD        acetaminophen tablet 650 mg  650 mg Oral Q6H PRN Judy Anthony NP        acyclovir tablet 800 mg  800 mg Oral BID Ozzie Echeverria DO   800 mg at 01/05/25 2042    alteplase injection 2 mg  2 mg Intra-Catheter PRN Darian Best MD        atovaquone 750 mg/5 mL oral liquid 1,500 mg  1,500 mg Oral Daily Ozzie Echeverria DO   1,500 mg at 01/05/25 0907    carvediloL tablet 6.25 mg  6.25 mg Oral BID Meg Palacios NP   6.25 mg at 01/05/25 2042    copper gluconate Cap 2 mg  2 mg Oral Daily Ozzie Echeverria DO   2 mg at 01/05/25 0908    dextrose 50% injection 12.5 g  12.5 g Intravenous PRN Ozzie Echeverria         dextrose 50% injection 25 g  25 g Intravenous PRN Ozzie Echeverria, DO        dicyclomine capsule 10 mg  10 mg Oral QID PRN Mohit Hickey MD   10 mg at 01/05/25 2046    diphenhydrAMINE injection 25 mg  25 mg Intravenous Q6H PRN Bj Freeman MD   25 mg at 01/06/25 0805    diphenoxylate-atropine  2.5-0.025 mg per tablet 1 tablet  1 tablet Oral QID PRN Meg Palacios NP   1 tablet at 01/04/25 0618    EPINEPHrine (PF) injection 0.3 mg  0.3 mg Intramuscular Once PRN Darian Best MD        folic acid tablet 1 mg  1 mg Oral Daily Judy Anthony, NP   1 mg at 01/05/25 0907    gabapentin capsule 600 mg  600 mg Oral QHS Ozzie Echeverria, DO   600 mg at 01/05/25 2042    glucagon (human recombinant) injection 1 mg  1 mg Intramuscular PRN Ozzie Echeverria, DO        glucose chewable tablet 16 g  16 g Oral PRN Ozzie Echeverria, DO        glucose chewable tablet 24 g  24 g Oral PRN Ozzie Echeverria, DO        heparin, porcine (PF) 100 unit/mL injection flush 500 Units  500 Units Intravenous PRN Darian Best MD        k phos di & mono-sod phos mono 250 mg tablet 1 tablet  1 tablet Oral Q4H PRN Ozzie Echeverria, DO   1 tablet at 01/05/25 2042    k phos di & mono-sod phos mono 250 mg tablet 2 tablet  2 tablet Oral Q4H PRN Ozzie Echeverria, DO   2 tablet at 12/29/24 1318    levoFLOXacin tablet 500 mg  500 mg Oral Daily Meg Palacios NP   500 mg at 01/05/25 0907    loperamide capsule 2 mg  2 mg Oral QID PRN Meg Palacios NP        LORazepam injection 0.5 mg  0.5 mg Intravenous On Call Procedure Meg Palacios NP   0.5 mg at 12/30/24 1423    magnesium sulfate 2g in water 50mL IVPB (premix)  4 g Intravenous Once Judy Anthony NP        [START ON 1/7/2025] magnesium sulfate 2g in water 50mL IVPB (premix)  2 g Intravenous PRN Judy Anthony NP        [START ON 1/7/2025] magnesium sulfate 2g in water 50mL IVPB (premix)  2 g Intravenous Q2H PRN Judy Anthony NP        naloxone 0.4 mg/mL injection 0.02 mg  0.02 mg Intravenous PRN Ozzie Echeverria DO        ondansetron disintegrating tablet 8 mg  8 mg Oral Q8H PRN Ozzie Echeverria DO   8 mg at 01/03/25 2048    pantoprazole EC tablet 40 mg  40 mg Oral Daily Ozzie Echeverria DO   40 mg at 01/05/25 0907    posaconazole EC tablet 300 mg  300 mg Oral Daily  EcheevrriaGaston buttsor, DO   300 mg at 01/05/25 0907    potassium chloride SA CR tablet 20 mEq  20 mEq Oral PRN EcheverriaGaston buttsor, DO   20 mEq at 01/05/25 1242    potassium chloride SA CR tablet 20 mEq  20 mEq Oral Q2H PRN Gaston Echeverriaor, DO   20 mEq at 01/06/25 0544    potassium chloride SA CR tablet 20 mEq  20 mEq Oral Q2H PRN Gaston Echeverriaor, DO   20 mEq at 01/04/25 1213    predniSONE tablet 60 mg  60 mg Oral Daily Mohit Hickey MD   60 mg at 01/05/25 0907    Followed by    [START ON 1/7/2025] predniSONE tablet 40 mg  40 mg Oral Daily Mohit Hickey MD        Followed by    [START ON 1/11/2025] predniSONE tablet 20 mg  20 mg Oral Daily Mohit Hickey MD        Followed by    [START ON 1/15/2025] predniSONE tablet 10 mg  10 mg Oral Daily Mohit Hickey MD        Followed by    [START ON 1/19/2025] predniSONE tablet 5 mg  5 mg Oral Daily Mohit Hickey MD        Followed by    [START ON 1/24/2025] predniSONE tablet 5 mg  5 mg Oral Every Other Day Mohit Hickey MD        prochlorperazine injection Soln 2.5 mg  2.5 mg Intravenous Q6H PRN Celso Baig MD   2.5 mg at 12/28/24 2037    rOPINIRole tablet 0.5 mg  0.5 mg Oral QHS Ozzie Echeverria, DO   0.5 mg at 01/05/25 2043    sodium chloride 0.9% flush 10 mL  10 mL Intravenous PRN Darian Best MD        tacrolimus capsule 0.5 mg  0.5 mg Oral Daily AM Darian Best MD   0.5 mg at 01/06/25 0805    tacrolimus capsule 0.5 mg  0.5 mg Oral Daily PM Meg Palacios NP   0.5 mg at 01/05/25 1805    traMADoL tablet 50 mg  50 mg Oral Q6H PRN Gaston Echeverriaor, DO        zolpidem tablet 5 mg  5 mg Oral Nightly PRN Ozzie Echeverria, DO   5 mg at 01/05/25 2203        Medication List        START taking these medications      levoFLOXacin 500 MG tablet  Commonly known as: LEVAQUIN  Take 1 tablet (500 mg total) by mouth once daily.     predniSONE 10 MG tablet  Commonly known as: DELTASONE  1/7 - 1/10: Prednisone 4 tablets (40mg) once a day; 1/11 - 1/14:  Prednisone 2 tablets (20mg) once a day; 1/15 - 1/18: Prednisone 1 tablet (10mg) once a day; 1/19 - 1/23: Prednisone 0.5 tablet (0.5mg) once a day; 1/24 - 1/27: Prednisone 0.5 tablet (0.5mg) every other day; 1/28: STOP            CONTINUE taking these medications      acyclovir 800 MG Tab  Commonly known as: ZOVIRAX  [place] ivette tableta (800 mg en total) por vía oral 2 veces al día.  (Take 1 tablet (800 mg total) by mouth 2 (two) times daily.)     atovaquone 750 mg/5 mL Susp oral liquid  Commonly known as: MEPRON  Take 10 mLs (1,500 mg total) by mouth once daily.     carvediloL 6.25 MG tablet  Commonly known as: COREG  Take 1 tablet (6.25 mg total) by mouth 2 (two) times daily.     copper gluconate 2 mg Cap  Take 2 mg by mouth once daily.     eltrombopag olamine 50 MG Tab  Commonly known as: PROMACTA  Take 1 tablet (50 mg total) by mouth once daily.     folic acid 1 MG tablet  Commonly known as: FOLVITE  Take 1 tablet (1 mg total) by mouth once daily.     gabapentin 300 MG capsule  Commonly known as: NEURONTIN  Take 2 capsules (600 mg total) by mouth every evening.     letermovir 480 mg Tab  Commonly known as: PREVYMIS  [place] 1 tableta (480 mg en total) por vía oral al día.  (Take 1 tablet (480 mg total) by mouth Daily.)     loperamide 2 mg Tab  Commonly known as: IMODIUM A-D  Take 2 mg by mouth 4 (four) times daily as needed.     magnesium oxide 400 mg (241.3 mg magnesium) tablet  Commonly known as: MAG-OX  Take 2 tablets (800 mg total) by mouth 2 (two) times daily.     ondansetron 8 MG Tbdl  Commonly known as: ZOFRAN-ODT  Take 1 tablet (8 mg total) by mouth every 8 (eight) hours as needed (nausea).     pantoprazole 40 MG tablet  Commonly known as: PROTONIX  Take 1 tablet (40 mg total) by mouth once daily.     posaconazole 100 mg Tbec tablet  Commonly known as: NOXAFIL  [place] 3 tabletas por via oral ivette vez al markus  (Take 3 tablets (300 mg total) by mouth once daily.)     rOPINIRole 0.5 MG tablet  Commonly known as:  REQUIP  Take 1 tablet (0.5 mg total) by mouth every evening.     tacrolimus 0.5 MG Cap  Commonly known as: PROGRAF  Take 1 capsule (0.5 mg total) by mouth every morning AND 1 capsule (0.5 mg total) every evening.     zolpidem 5 MG Tab  Commonly known as: AMBIEN  Take 1 tablet (5 mg total) by mouth nightly as needed (insomnia).            STOP taking these medications      traMADoL 50 mg tablet  Commonly known as: ULTRAM                I have seen and examined this patient within the last 30 days. My clinical findings that support the need for the home health skilled services and home bound status are the following:no   Weakness/numbness causing balance and gait disturbance due to Weakness/Debility, Anemia, and Malignancy/Cancer making it taxing to leave home.  Requiring assistive device to leave home due to unsteady gait caused by  Weakness/Debility, Anemia, and Malignancy/Cancer.     Diet:   regular diet    Labs:  N/A    Referrals/ Consults  Physical Therapy to evaluate and treat. Evaluate for home safety and equipment needs; Establish/upgrade home exercise program. Perform / instruct on therapeutic exercises, gait training, transfer training, and Range of Motion.  Occupational Therapy to evaluate and treat. Evaluate home environment for safety and equipment needs. Perform/Instruct on transfers, ADL training, ROM, and therapeutic exercises.    Activities:   activity as tolerated    Nursing:   Agency to admit patient within 24 hours of hospital discharge unless specified on physician order or at patient request    SN to complete comprehensive assessment including routine vital signs. Instruct on disease process and s/s of complications to report to MD. Review/verify medication list sent home with the patient at time of discharge  and instruct patient/caregiver as needed. Frequency may be adjusted depending on start of care date.     Skilled nurse to perform up to 3 visits PRN for symptoms related to  diagnosis    Notify MD if SBP > 160 or < 90; DBP > 90 or < 50; HR > 120 or < 50; Temp > 100.4F; O2 < 88%    Ok to schedule additional visits based on staff availability and patient request on consecutive days within the home health episode.    When multiple disciplines ordered:    Start of Care occurs on Sunday - Wednesday schedule remaining discipline evaluations as ordered on separate consecutive days following the start of care.    Thursday SOC -schedule subsequent evaluations Friday and Monday the following week.     Friday - Saturday SOC - schedule subsequent discipline evaluations on consecutive days starting Monday of the following week.    For all post-discharge communication and subsequent orders please contact patient's primary oncologist Dr. Mohit Hickey @ [phone] for clinical staff order clarification    Home Health Aide:  Physical Therapy Three times weekly and Occupational Therapy Three times weekly    Wound Care Orders  no    I certify that this patient is confined to his home and needs physical therapy and occupational therapy.      Judy Anthony NP  Hematology/Oncology/BMT

## 2025-01-06 NOTE — PLAN OF CARE
Fall and new injury posted in previous note.  No further complaints this shift.  Bed alarm on.  Wife remains at bedside.  Plan to d/c today.

## 2025-01-06 NOTE — PLAN OF CARE
Recommendations  --Continue Regular diet as tolerated and clinically indicated   --Encourage good intakes   --Nursing: please continue to document % meal eaten on flowsheet   --RD to monitor weight, PO intake      Goals: Meet % EEN/EPN  Nutrition Goal Status: new  Communication of RD Recs:  (POC)

## 2025-01-06 NOTE — CONSULTS
Permacath Removal Procedure Note         Procedure:   Right permacath removal     Date: 01/06/2025     Location: right internal jugular vein     Anesthesia: Local       Procedure:  After verbal consent and timeout was performed. Patient was placed supine and in trendelenburg. The patient was prepped and draped in sterile fashion. Permacath cuff was dissected and catheter was removed without any resistance. Pressure was held at the IJ insertion site for 5 minutes. A sterile dressing was applied. Patient tolerated the procedure well.       Complications/Comments:  none     Estimated Blood Loss: None        Ethan Thomas MD  [facility]

## 2025-01-06 NOTE — DISCHARGE SUMMARY
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 He was seen in clinic by Dr. Kruse, his hematologist this morning in clinic and was noted to have pain in shoulders/heavy weight which he attributes to anemia, leg weakness, loss of appetite, and 2 days of mild to moderate diarrhea. Loperamide helped. He has not had fever, chills, abdominal pain, cough, sore throat, SOB, CP, or urinary discomfort. He was meant to get IV fluids after clinic today due to mild ADDISON (sCr 1.2, baseline 0.6-0.9) however presented to ED instead due to symptoms. He was started on vanc/Zosyn and received 1L IVF with improvement in BP from 73/45 to 95/60. Patient seen at bedside and feeling much better. He states that he has not had a lot to eat or drink for several days due to worsening appetite.     Patient had another episode of hypotension 72/45 with improvement again to 97/56 with 1L IVF. MICU evaluated and did not take due to improvement in BP. CXR with persistent improving or recurrent infiltrate at the left lung base with a small pleural effusion. UA pending. Bcx sent. , plt 19, Hgb 11.4.     * No surgery found *     Hospital Course: 12/27/2024 Day +99 s/p Flu/[place]/TBI + PtCy Haplo son BMT for MDS. Admitted yesterday through ED with hypotension, weakness, and diarrhea.  Neutropenic fever overnight with tmax 102.7F. Infectious workup unremarkable thus far except for chronic LLL infiltrate. Pending CT c/a/p today for further evaluation. Remains on cefepime. Tacro level yesterday 13.2, decreased dose to 0.5mg daily. Repeat level today 8.2. Will repeat dose tomorrow. ADDISON now resolved after IVF. 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 Last EBV on 12/23 was 4665, repeat pending. Replacing mag and phos. Vitals otherwise stable. PO intake encouraged as tolerated. PT/OT consulted  12/30/2024 Day +102 s/p Flu/[place]/TBI + PtCy Haplo son BMT for MDS. Tacro 10.1 today, tacro changed to 0.5mg every other day. Day 100 bone marrow biopsy completed today, also done for suspicion of HLH. Received Rituxan yesterday evening. Continues to have high fevers, T.max 103 overnight. Day 4 of Zosyn. IV Vanc and po Vanc stopped today per ID. Continues to have multiple episodes of diarrhea, 9 documented in the last 24 hours. GI consulted for EGD and Flex Sig. Methylpred 2mg/kg/day started. Imodium changed to ATC and PRN Lomotil added. Patient denies abd pain, nausea, states stool is red but has been drinking a lot of red powerade.   12/31/2024. Day + 103 from a Flu/[place]/TBI + PtCy Haplo (son) BMT for high grade MDS. Last fever was ~ 24 hours ago. VSS. Ferritin up to 26K today. BMBx performed yesterday and results pending. Day 2 of methylpred. Family reports AMS that started over night. States that patient is saying things that don't make sense. May be due to steroids/ meds. GI deferring flex sig to r/o GVHD of the gut until ANC is > 500. Multiple BMs yesterday, but only one over night per family and nursing documentation. May be responding to steroids if this is GVHD. Weekly CMV and EBV ordered.  01/02/2025 Day + 104 from a Flu/[place]/TBI + PtCy Haplo (son) BMT for high grade MDS.  Afebrile since 12/30. ID has signed off. Completing Zosyn and will transition to ppx  Levaquin. 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 Awaiting BM bx results. Reports no diarrhea since yesterday. Will start oral steroid taper. GI has signed off.   01/03/2025 Day + 105 from a Flu/[place]/TBI + PtCy Haplo (son) BMT for high grade MDS. Tacro level 5.4 this am, Tacro increased to 0.5 mg bid from daily. Bone marrow biopsy revealing 30-40% cellularity, erythroid hyperplasia, dyserythropoiesis, decreased megakaryocytes with atypical morphology. No hemophagocytosis mentioned. EBV up to 45,870. Completed 4 days of IV steroids, starting quick po taper today. Imodium changed to PRN given improvement in diarrhea. BP now high normal, up 14 lbs in the past week and net +intake. Will stop IVF and restart home Coreg.  01/04/2025 Day + 106 from a Flu/[place]/TBI + PtCy Haplo (son) BMT for high grade MDS. 3 loose stools noted overnight however overall improved. Transfusing 1u PLT and K and Mg. On PO steroid taper. Seen comfortably sleeping today  01/05/2025 Day +108 from Flu/[place]/TBI + PtCy Haplo (son) BMT for high grade MDS. No BM overnight though did have two non diarrheic stools this am. Feels well. Likely dc on 1/6 01/06/2025 Patient discharging today with weekly labs and every other week follow up. He has clinic visit on 1/10. Will have HH PT/OT and then resume outpatient PT. Receiving 1unit PRBC, 1unit platelets and 4g IV magnesium prior to discharge. Central line will be removed by surgery prior to DC. Daughter and wife at bedside and updated on POC. Will continue steroid taper on discharge. Med rec updated and patient provided updating medication sheet from pharmacy team. He will continue weekly tacro & EBV/CMV monitoring with labs    Goals of Care Treatment Preferences:  Code Status: Full Code    Health care agent: Ynenifer Thomas  [facility] care agent number: [phone]          What is most important right now is to focus on remaining as independent as possible.  Accordingly, we have decided that  the best plan to meet the patient's goals includes continuing with treatment.      Consults (From admission, onward)          Status Ordering Provider     Inpatient consult to General Surgery  Once        Provider:  (Not yet assigned)    Ordered JASE RUSSO     Inpatient consult to Gastroenterology  Once        Provider:  (Not yet assigned)    Completed PETR CAVANAUGH     Inpatient consult to Infectious Diseases  Once        Provider:  (Not yet assigned)    Completed ASHOK PADILLA     Inpatient consult to Critical Care Medicine  Once        Provider:  (Not yet assigned)    Completed DEE GAUTHIER     Inpatient consult to Hematology/Oncology  Once        Provider:  (Not yet assigned)    Completed DEE GAUTHIER            Significant Diagnostic Studies: Labs: CMP   Recent Labs   Lab 01/05/25  0431 01/06/25  0408    137   K 3.7 3.5   CL 99 98   CO2 30* 31*   * 229*   BUN 17 18   CREATININE 0.8 0.8   CALCIUM 8.5* 8.2*   PROT 5.1* 4.8*   ALBUMIN 2.7* 2.5*   BILITOT 1.1* 0.9   ALKPHOS 79 106   AST 54* 54*   ALT 84* 91*   ANIONGAP 8 8    and CBC   Recent Labs   Lab 01/05/25  0431 01/06/25  0408   WBC 2.19* 2.61*   HGB 8.0* 7.4*   HCT 23.0* 21.1*   PLT 12* 8*     Specimen (24h ago, onward)      None            Pending Diagnostic Studies:       None          Final Active Diagnoses:    Diagnosis Date Noted POA    PRINCIPAL PROBLEM:  Neutropenic fever [D70.9, R50.81] 09/20/2024 Yes    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] 08/27/2024 Not Applicable    Myelodysplastic syndrome [D46.9] 05/12/2023 Yes    Pancytopenia [D61.818] 12/26/2024 Yes    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] 12/26/2024 Not Applicable    Diarrhea [R19.7] 12/30/2024 Yes    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] 12/30/2024 Yes    Hypokalemia [E87.6] 12/30/2024 Yes    Hypophosphatemia [E83.39] 12/30/2024 Yes    Copper deficiency [E61.0] 12/27/2024 Yes    Folate deficiency [E53.8] 12/27/2024 Yes     "RLS (restless legs syndrome) [G25.81] 12/26/2024 Yes    ADDISON (acute kidney injury) [N17.9] 12/26/2024 Yes    Insomnia [G47.00] 09/17/2024 Yes    Hypertension, essential [I10] 11/29/2021 Yes    Physical debility [R53.81] 11/08/2021 Yes      Problems Resolved During this Admission:    Diagnosis Date Noted Date Resolved POA    Pancytopenia due to antineoplastic chemotherapy [D61.810, T45.1X5A] 06/29/2023 12/27/2024 Yes    Altered mental status [R41.82] 12/31/2024 01/04/2025 Yes    Hypotension [I95.9] 12/26/2024 01/04/2025 Yes    Immunosuppression [D84.9] 12/26/2024 12/27/2024 Yes      Discharged Condition: good    Disposition: Home or Self Care    Follow Up:   Future Appointments   Date Time Provider Department Center   1/10/2025  9:50 AM [facility] LAB BMT [facility] LABBMT [facility]   1/10/2025 10:30 AM BMT, PHARMACIST MYELOMA [facility] BMT [facility]   1/10/2025 11:00 AM Gilma Ugalde PA-C [facility] BMT [facility]   1/14/2025  1:00 PM Elvira Joyce, PT [facility] OP RHB [facility]   1/15/2025  9:30 AM [facility] LAB BMT [facility] LABBMT [facility]   1/17/2025  1:00 PM Elvira Joyce, PT [facility] OP RHB [place] [facility]   1/21/2025  1:00 PM Julian Morrow PTA [facility] OP RHB [facility]   1/23/2025  7:50 AM [facility] LAB BMT [facility] LABBMT [facility]   1/23/2025  9:00 AM Mohit Hickey MD [facility] BMT [facility]   1/24/2025  1:00 PM Elvira Joyce, PT [facility] OP RHB [facility]   1/28/2025  1:00 PM Julian Morrow PTA [facility] OP RHB [facility]   1/31/2025  7:50 AM [facility] LAB BMT [facility] LABBMT [facility]   1/31/2025  9:00 AM Gilma Ugalde PA-C [facility] HC BMT [facility]   1/31/2025  1:00 PM Elvira Joyce, PT [facility] OP [facility]   2/27/2025 11:00 AM Elvira Maria MD [facility] PLMDBEN [facility]       Patient Instructions:      WALKER FOR HOME USE     Order Specific Question Answer Comments   Type of Walker: Adult (5'4"-6'6")    With wheels? Yes    Height: 5' 9" (1.753 m)    Weight: 65.1 kg (143 lb 8.3 oz)    Length of " need (1-99 months): 99    Does patient have medical equipment at home? grab bar    Does patient have medical equipment at home? rollator    Does patient have medical equipment at home? shower chair    Does patient have medical equipment at home? raised toilet    Please check all that apply: Patient's condition impairs ambulation.    Please check all that apply: Patient is unable to safely ambulate without equipment.    Vendor: [facility] ALREADY HAS RW   CRM Resolution Date: 10/16/2024      Diet Adult Regular     Notify your health care provider if you experience any of the following:  temperature >100.4     Notify your health care provider if you experience any of the following:  persistent nausea and vomiting or diarrhea     Notify your health care provider if you experience any of the following:  severe uncontrolled pain     Notify your health care provider if you experience any of the following:  redness, tenderness, or signs of infection (pain, swelling, redness, odor or green/yellow discharge around incision site)     Notify your health care provider if you experience any of the following:  difficulty breathing or increased cough     Notify your health care provider if you experience any of the following:  severe persistent headache     Notify your health care provider if you experience any of the following:  worsening rash     Notify your health care provider if you experience any of the following:  persistent dizziness, light-headedness, or visual disturbances     Notify your health care provider if you experience any of the following:  increased confusion or weakness     Activity as tolerated     Medications:  Reconciled Home Medications:      Medication List        START taking these medications      levoFLOXacin 500 MG tablet  Commonly known as: LEVAQUIN  [place] ivette tableta (500 mg en total) por vía oral diariamente.  (Take 1 tablet (500 mg total) by mouth once daily.)     predniSONE 10 MG tablet  Commonly  known as: DELTASONE  1/7 - 1/10: Prednisone 4 tablets (40mg) once a day; 1/11 - 1/14: Prednisone 2 tablets (20mg) once a day; 1/15 - 1/18: Prednisone 1 tablet (10mg) once a day; 1/19 - 1/23: Prednisone 0.5 tablet (0.5mg) once a day; 1/24 - 1/27: Prednisone 0.5 tablet (0.5mg) every other day; 1/28: STOP            CONTINUE taking these medications      acyclovir 800 MG Tab  Commonly known as: ZOVIRAX  [place] ivette tableta (800 mg en total) por vía oral 2 veces al día.  (Take 1 tablet (800 mg total) by mouth 2 (two) times daily.)     atovaquone 750 mg/5 mL Susp oral liquid  Commonly known as: MEPRON  Take 10 mLs (1,500 mg total) by mouth once daily.     carvediloL 6.25 MG tablet  Commonly known as: COREG  Take 1 tablet (6.25 mg total) by mouth 2 (two) times daily.     copper gluconate 2 mg Cap  Take 2 mg by mouth once daily.     eltrombopag olamine 50 MG Tab  Commonly known as: PROMACTA  Take 1 tablet (50 mg total) by mouth once daily.     folic acid 1 MG tablet  Commonly known as: FOLVITE  Take 1 tablet (1 mg total) by mouth once daily.     gabapentin 300 MG capsule  Commonly known as: NEURONTIN  Take 2 capsules (600 mg total) by mouth every evening.     letermovir 480 mg Tab  Commonly known as: PREVYMIS  [place] 1 tableta (480 mg en total) por vía oral al día.  (Take 1 tablet (480 mg total) by mouth Daily.)     loperamide 2 mg Tab  Commonly known as: IMODIUM A-D  Take 2 mg by mouth 4 (four) times daily as needed.     magnesium oxide 400 mg (241.3 mg magnesium) tablet  Commonly known as: MAG-OX  Take 2 tablets (800 mg total) by mouth 2 (two) times daily.     ondansetron 8 MG Tbdl  Commonly known as: ZOFRAN-ODT  Take 1 tablet (8 mg total) by mouth every 8 (eight) hours as needed (nausea).     pantoprazole 40 MG tablet  Commonly known as: PROTONIX  Take 1 tablet (40 mg total) by mouth once daily.     posaconazole 100 mg Tbec tablet  Commonly known as: NOXAFIL  [place] 3 tabletas por via oral ivette lacho al markus  (Take 3 tablets (300  mg total) by mouth once daily.)     rOPINIRole 0.5 MG tablet  Commonly known as: REQUIP  Take 1 tablet (0.5 mg total) by mouth every evening.     tacrolimus 0.5 MG Cap  Commonly known as: PROGRAF  Take 1 capsule (0.5 mg total) by mouth every morning AND 1 capsule (0.5 mg total) every evening.     zolpidem 5 MG Tab  Commonly known as: AMBIEN  Take 1 tablet (5 mg total) by mouth nightly as needed (insomnia).            STOP taking these medications      traMADoL 50 mg tablet  Commonly known as: SALVADOR Anthony NP  Bone Marrow Transplant  [facility] - Oncology ([facility])

## 2025-01-06 NOTE — NURSING
Pt's wife reports pt went to the bathroom and fell around midnight.  Wife just reported to this nurse.  Skin abrasion noted to the right knee.

## 2025-01-06 NOTE — NURSING
Wife revised statement and said it happened at around 0200.  Md on call notified.  No new orders at this time.  The pt and wife state that he was grabbing the rail and slowly going down with each step..  No further injuries other than knee noted.  Photo taken and added to LDA.

## 2025-01-06 NOTE — PROGRESS NOTES
Received request to assist with discharge planning.  Referral to [facility] sent to [facility] via [email or web address]; accepted by [facility] for [facility].  Notified by [facility] that RW will not be covered after previously ereceiving a rollator in October.  NP notified; he is OK to continue using the rollator.    Pt. and spouse seen for discharge planning.  They are comfortable continuing to use rollator.  They are still awaiting notification of Promacta shipment.  Will update following Octavio Robbins on plan on his return.  .

## 2025-01-06 NOTE — PROGRESS NOTES
Janusz Ma - Oncology ([facility])  Adult Nutrition  Progress Note    SUMMARY       Recommendations  --Continue Regular diet as tolerated and clinically indicated   --Encourage good intakes   --Nursing: please continue to document % meal eaten on flowsheet   --RD to monitor weight, PO intake     Goals: Meet % EEN/EPN  Nutrition Goal Status: new  Communication of RD Recs:  (POC)    Assessment and Plan    Endocrine  Moderate protein-calorie malnutrition  Malnutrition Type:  Context: chronic illness  Level: moderate    Related to (etiology):   Metabolic demand of disease and treatment    Signs and Symptoms (as evidenced by):   Muscle/fat wasting, significant weight loss     Malnutrition Characteristic Summary:  Weight Loss (Malnutrition): greater than 10% in 6 months (11.7% weight loss x 6 months)  Subcutaneous Fat (Malnutrition): moderate depletion  Muscle Mass (Malnutrition): moderate depletion      Interventions/Recommendations (treatment strategy):  Collaboration of nutrition care with other providers    Nutrition Diagnosis Status:   New         Malnutrition Assessment  Malnutrition Context: chronic illness  Malnutrition Level: moderate          Weight Loss (Malnutrition): greater than 10% in 6 months (11.7% weight loss x 6 months)  Subcutaneous Fat (Malnutrition): moderate depletion  Muscle Mass (Malnutrition): moderate depletion   Orbital Region (Subcutaneous Fat Loss): moderate depletion  Upper Arm Region (Subcutaneous Fat Loss): moderate depletion   [place] Region (Muscle Loss): moderate depletion  Clavicle Bone Region (Muscle Loss): moderate depletion  Clavicle and Acromion Bone Region (Muscle Loss): moderate depletion  Dorsal Hand (Muscle Loss): moderate depletion  Patellar Region (Muscle Loss): moderate depletion  Anterior Thigh Region (Muscle Loss): moderate depletion  Posterior Calf Region (Muscle Loss): moderate depletion                 Reason for Assessment    Reason For Assessment: length of stay (LOS  "x 10 days)  Diagnosis:  (Neutropenic fever)  General Information Comments: 69 y.o.male with a past medical history of MDS who is status post haploidentical stem cell transplantation conditioned with FluMel 100 + PTCy+ 2Gy TBI who is currently day +98 who presents to the ED with weakness and lightheadedness, found to have hypotension. LOS x 10 days. Spoke with pt and family at bedside. Pt reports his appetite is not 100% yet but improving. Family states pt eats 50% of protein on the plate and about 25% of the rest of the plate. Family states pt ate 75% of breakfast this morning. Denies n/v/c but endorses diarrhea. Pt endorses weight loss. Noted 11.7% weight loss x 6 months. NPFE performed - see above for details.    Nutrition Discharge Planning: Adequate nutritional intake    Nutrition Related Social Determinants of Health: SDOH: Adequate food in home environment      Nutrition/Diet History    Spiritual, Cultural Beliefs, Hinduism Practices, Values that Affect Care: no  Food Allergies: NKFA  Factors Affecting Nutritional Intake: decreased appetite    Anthropometrics    Temp: 97.9 °F (36.6 °C)  Height Method: Stated  Height: 5' 9" (175.3 cm)  Height (inches): 69 in  Weight Method: Standard Scale  Weight: 65.1 kg (143 lb 8.3 oz)  Weight (lb): 143.52 lb  Ideal Body Weight (IBW), Male: 160 lb  % Ideal Body Weight, Male (lb): 89.7 %  BMI (Calculated): 21.2  BMI Grade: 18.5-24.9 - normal       Lab/Procedures/Meds    Pertinent Labs Reviewed: reviewed - hemoglobin 7.4, hematocrit 21.1, , MCHC 37, glucose 229, Ca 8.2, Mg 1.3, AST 54, ALT 91    Pertinent Medications Reviewed: reviewed - copper gluconate, folic acid, gabapentin, magnesium sulfate, pantoprazole, prednisone, tacrolimus    Estimated/Assessed Needs    Weight Used For Calorie Calculations: 65.1 kg (143 lb 8.3 oz)  Energy Calorie Requirements (kcal): 3634-7110 kcal/day (25-30 kcal/kg)  Energy Need Method: Kcal/kg  Protein Requirements: 65-78 g/day (1.0-1.2 " g/kg)  Weight Used For Protein Calculations: 65.1 kg (143 lb 8.3 oz)     Estimated Fluid Requirement Method: RDA Method  RDA Method (mL): 1628  CHO Requirement: 204-244 g/day      Nutrition Prescription Ordered    Current Diet Order: Regular    Evaluation of Received Nutrient/Fluid Intake    Comments: LBM 1/5   % Intake of Estimated Energy Needs: 50 - 75 %  % Meal Intake: 50 - 75 %    Nutrition Risk    Level of Risk/Frequency of Follow-up: high     Monitor and Evaluation    Food and Nutrient Intake: energy intake, food and beverage intake  Food and Nutrient Adminstration: diet order  Knowledge/Beliefs/Attitudes: food and nutrition knowledge/skill  Physical Activity and Function: nutrition-related ADLs and IADLs  Anthropometric Measurements: weight, weight change, body mass index  Biochemical Data, Medical Tests and Procedures: electrolyte and renal panel, gastrointestinal profile, glucose/endocrine profile, inflammatory profile, lipid profile  Nutrition-Focused Physical Findings: overall appearance     Nutrition Follow-Up    RD Follow-up?: Yes    Judy Antonio, Registration Eligible, Provisional LDN

## 2025-01-06 NOTE — PT/OT/SLP PROGRESS
Occupational Therapy      Patient Name:  Guillaume Salinas   MRN:  [id number]    Patient not seen today secondary to  (due to pt with discharge orders on chart.  Will see 1/7/25 if not discharged from hospital on this date as indicated.). Will follow-up as appropriate.    1/6/2025

## 2025-01-07 ENCOUNTER — PATIENT MESSAGE (OUTPATIENT)
Dept: HEMATOLOGY/ONCOLOGY | Facility: CLINIC | Age: 70
End: 2025-01-07
Payer: MEDICARE

## 2025-01-07 DIAGNOSIS — D84.822 IMMUNODEFICIENCY DUE TO EXTERNAL CAUSE: ICD-10-CM

## 2025-01-07 LAB
ANNOTATION COMMENT IMP: NORMAL
COMMENT: NORMAL
FINAL DIAGNOSIS - CHIMERISM SORT: NORMAL
FINAL PATHOLOGIC DIAGNOSIS: NORMAL
GROSS: NORMAL
Lab: NORMAL
MICROSCOPIC EXAM: NORMAL
NGS CLINCIAL TRIALS: NORMAL
NGS INDICATION OF TEST: NORMAL
NGS INTERPRETATION: NORMAL
NGS ONCOHEME PANEL GENE LIST: NORMAL
NGS PATHOGENIC MUTATIONS DETECTED: NORMAL
NGS REVIEWED BY:: NORMAL
NGS VARIANTS OF UNKNOWN SIGNIFICANCE: NORMAL
NGSHM RESULT, BONE MARROW: NORMAL
REF LAB TEST METHOD: NORMAL
SPECIMEN SOURCE: NORMAL
SPECIMEN TYPE -CHIMERISM SORT: NORMAL
SUPPLEMENTAL DIAGNOSIS: NORMAL
TEST PERFORMANCE INFO SPEC: NORMAL

## 2025-01-07 RX ORDER — ATOVAQUONE 750 MG/5ML
1500 SUSPENSION ORAL DAILY
Qty: 210 ML | Refills: 2 | Status: SHIPPED | OUTPATIENT
Start: 2025-01-07

## 2025-01-07 NOTE — PLAN OF CARE
Day +109 of allo sct. Pt involved in plan of care and communicating needs throughout shift. Up in room and to bathroom x1 assist; voiding without difficulty. Tolerating diet. No c/o pain. One unit of plts and blood transfused. All VSS. Pt remaining free from falls or injury throughout shift. Bed locked and in lowest position, personal belongings and call light within reach, non skid socks on when OOB. Pt instructed to call for assistance as needed. Bed alarm in use. Hourly rounding done on pt.    Pt discharged to home per MD order. Discharge instructions and prescriptions given and explained to pt's daughter. DMITRY vaughn removed by general surgery. All VSS; O2 sats WNL. Pt left floor by wheelchair with family; accompanied by tech from the floor.

## 2025-01-08 ENCOUNTER — PATIENT OUTREACH (OUTPATIENT)
Dept: ADMINISTRATIVE | Facility: CLINIC | Age: 70
End: 2025-01-08
Payer: MEDICARE

## 2025-01-08 NOTE — PROGRESS NOTES
Section of Hematology and Stem Cell Transplantation    Post-Transplantation Follow Up Visit   The patient location is: [place], [place]  The chief complaint leading to consultation is: Post allo transplant     Visit type: audiovisual    Face to Face time with patient: 33  50 minutes of total time spent on the encounter, which includes face to face time and non-face to face time preparing to see the patient (eg, review of tests), Obtaining and/or reviewing separately obtained history, Documenting clinical information in the electronic or other health record, Independently interpreting results (not separately reported) and communicating results to the patient/family/caregiver, or Care coordination (not separately reported).         Each patient to whom he or she provides medical services by telemedicine is:  (1) informed of the relationship between the physician and patient and the respective role of any other health care provider with respect to management of the patient; and (2) notified that he or she may decline to receive medical services by telemedicine and may withdraw from such care at any time.    Notes:    01/10/2025    Transplant History:   Primary oncologist: Paco Hickey MD  Primary oncologic diagnosis: MDS  Transplant date: 9/19/2024  Donor: haploidentical  Blood Type (Patient): B +  Blood Type (Donor): A +  CMV (Patient): Positive  CMV (Donor): Positive  Graft source: Bone marrow  CD34+ cell dose: 3.55x10^6  Conditioning Regimen: Fludarabine plus melphalan 100 mg/m2 + 2Gy TBI  GVHD prophylaxis: Post-transplant cyclophosphamide, Tacrolimus, MMF  Immediate post-transplant complications: Patient experienced expected GI toxicities with C diff negative diarrhea and nausea, neutropenic fever with negative infectious work up, expected cytopenias requiring transfusions, electrolyte abnormalities requiring replacement, volume overload requiring diuresis, intermittent SOB from pulmonary edema requiring 02, and a  hemorrhoid flare up. Diarrhea and SOB improved towards the end of the hospital stay.     History of Present Ilness:   Guillaume Salinas (Guillaume) is a pleasant 69 y.o.male with a past medical history of MDS who is status post haploidentical stem cell transplantation conditioned with FluMel 100 + PTCy+ 2Gy TBI who is currently day +113  who presents for post-transplant follow up. Offered  through language services, declined.    Interval History:   He went to the ER 12/26/24 after being seen in clinic due to weakness and lightheadedness and was found to have hypotension and a mild ADDISON with creatinine 1.2. He was immediately started on vanc/Zosyn and received 1L IVF with improvement in BP from 73/45 to 95/60. On 12/30/24 he received rituxan due to elevated EBV and he also has a t-max of 103 with 9 episodes of diarrhea.Family noticed altered mental status 12/31/25 and GI refused flex sig until ANC >500 . 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 He had a bone marrow done inpatient showing 30-40% cellularity, erythroid hyperplasia, dyserythropoiesis, decreased megakaryocytes with atypical morphology. No hemophagocytosis mentioned.  He began tapering tacro on 1/3 due to still having elevated EBV at 45,870. He was discharged 1/6/25 with home health PT/OT, levaquin, and a steroid taper,      Patient presents for routine follow-up. He feels well but complains of weakness. He received blood and platelets before being discharged but he feels weak and off balance. He is taking levaquin and his steroid taper. PT/OT called him and he opted for outpatient PT that starts next Thursday. He should get promacta in the next week. He is going back to get his CBC and CMP done to see if a transfusion will be needed. He does not have any nausea, vomiting, or diarrhea. No rash. Will continue steroid taper for suspected GVHD when hospitalized  but symptoms now resolved.     PAST MEDICAL HISTORY:   Past Medical History:   Diagnosis Date    Anticoagulant long-term use     Coronary artery disease     Hypertension     Myelodysplastic syndrome     Peripheral vascular disease, unspecified        PAST SURGICAL HISTORY:   Past Surgical History:   Procedure Laterality Date    BONE MARROW BIOPSY Left 2023    Procedure: Biopsy-bone marrow;  Surgeon: Harry Diamond MD;  Location: [facility] OR;  Service: Oncology;  Laterality: Left;    COLONOSCOPY N/A 2022    Procedure: COLONOSCOPY Golytely Vaccinated will bring cards;  Surgeon: Dereje Simon MD;  Location: [facility] ENDO;  Service: Endoscopy;  Laterality: N/A;  Do not cancel this order    INSERTION OF LEMONS CATHETER Right 2024    Procedure: INSERTION, CATHETER, CENTRAL VENOUS, LEMONS TRIPLE LUMEN;  Surgeon: Kg Patten MD;  Location: [address];  Service: General;  Laterality: Right;     PAST SOCIAL HISTORY:  Social History     Socioeconomic History    Marital status:    Tobacco Use    Smoking status: Former     Current packs/day: 0.00     Average packs/day: 0.3 packs/day for 50.0 years (12.5 ttl pk-yrs)     Types: Cigarettes     Start date: 3/1/1973     Quit date: 3/1/2023     Years since quittin.8     Passive exposure: Past    Smokeless tobacco: Never   Substance and Sexual Activity    Alcohol use: Not Currently    Drug use: Never    Sexual activity: Not Currently     Partners: Female     Social Drivers of Health     Financial Resource Strain: Patient Declined (2024)    Overall Financial Resource Strain (CARDIA)     Difficulty of Paying Living Expenses: Patient declined   Food Insecurity: Patient Declined (2024)    Hunger Vital Sign     Worried About Running Out of Food in the Last Year: Patient declined     Ran Out of Food in the Last Year: Patient declined   Transportation Needs: Patient Declined (2024)    TRANSPORTATION NEEDS     Transportation :  Patient declined   Physical Activity: Inactive (1/10/2025)    Exercise Vital Sign     Days of Exercise per Week: 0 days     Minutes of Exercise per Session: 10 min   Stress: Patient Declined (12/28/2024)    [place] citizen [place] [employer] - Occupational Stress Questionnaire     Feeling of Stress : Patient declined   Recent Concern: Stress - Stress Concern Present (9/30/2024)    [place] citizen [place] [employer] - Occupational Stress Questionnaire     Feeling of Stress : To some extent   Housing Stability: Patient Declined (12/28/2024)    Housing Stability Vital Sign     Unable to Pay for Housing in the Last Year: Patient declined     Homeless in the Last Year: Patient declined       FAMILY HISTORY:  Cancer-related family history includes Cancer in his brother and father.    CURRENT MEDICATIONS:   Medication List with Changes/Refills   Current Medications    ACYCLOVIR (ZOVIRAX) 800 MG TAB    Take 1 tablet (800 mg total) by mouth 2 (two) times daily.    ATOVAQUONE (MEPRON) 750 MG/5 ML SUSP ORAL LIQUID    Take 10 mLs (1,500 mg total) by mouth once daily.    CARVEDILOL (COREG) 6.25 MG TABLET    Take 1 tablet (6.25 mg total) by mouth 2 (two) times daily.    COPPER GLUCONATE 2 MG CAP    Take 2 mg by mouth once daily.    ELTROMBOPAG OLAMINE (PROMACTA) 50 MG TAB    Take 1 tablet (50 mg total) by mouth once daily.    FOLIC ACID (FOLVITE) 1 MG TABLET    Take 1 tablet (1 mg total) by mouth once daily.    GABAPENTIN (NEURONTIN) 300 MG CAPSULE    Take 2 capsules (600 mg total) by mouth every evening.    LETERMOVIR (PREVYMIS) 480 MG TAB    Take 1 tablet (480 mg total) by mouth Daily.    LEVOFLOXACIN (LEVAQUIN) 500 MG TABLET    Take 1 tablet (500 mg total) by mouth once daily.    LOPERAMIDE (IMODIUM A-D) 2 MG TAB    Take 2 mg by mouth 4 (four) times daily as needed.    MAGNESIUM OXIDE (MAG-OX) 400 MG (241.3 MG MAGNESIUM) TABLET    Take 2 tablets (800 mg total) by mouth 2 (two) times daily.    ONDANSETRON  (ZOFRAN-ODT) 8 MG TBDL    Take 1 tablet (8 mg total) by mouth every 8 (eight) hours as needed (nausea).    PANTOPRAZOLE (PROTONIX) 40 MG TABLET    Take 1 tablet (40 mg total) by mouth once daily.    POSACONAZOLE (NOXAFIL) 100 MG TBEC TABLET    Take 3 tablets (300 mg total) by mouth once daily.    PREDNISONE (DELTASONE) 10 MG TABLET    1/7 - 1/10: Prednisone 4 tablets (40mg) once a day; 1/11 - 1/14: Prednisone 2 tablets (20mg) once a day; 1/15 - 1/18: Prednisone 1 tablet (10mg) once a day; 1/19 - 1/23: Prednisone 0.5 tablet (0.5mg) once a day; 1/24 - 1/27: Prednisone 0.5 tablet (0.5mg) every other day; 1/28: STOP    ROPINIROLE (REQUIP) 0.5 MG TABLET    Take 1 tablet (0.5 mg total) by mouth every evening.    TACROLIMUS (PROGRAF) 0.5 MG CAP    Take 1 capsule (0.5 mg total) by mouth every morning AND 1 capsule (0.5 mg total) every evening.    ZOLPIDEM (AMBIEN) 5 MG TAB    Take 1 tablet (5 mg total) by mouth nightly as needed (insomnia).       ALLERGIES:   Review of patient's allergies indicates:  No Known Allergies    GVHD Review of Systems:     Pertinent positives and negatives included in the HPI. Otherwise a 14 point review of systems is negative. GVHD review of systems recorded in BMT flowsheet.     Physical Exam:     There were no vitals filed for this visit.  108/68  98.4    General: Appears well, NAD.   HEENT: MMM, no OP lesions  Pulmonary: CTAB, no increased work of breathing, no W/R/C  Cardiovascular: S1S2 normal, RRR, no M/R/G  Abdominal: Soft, NT, ND, BS+, no HSM  Extremities: No C/C/E  Neurological: AAOx4, grossly normal, no focal deficits  Dermatologic: No appreciable rashes or lesions    ECOG Performance Status: (foot note - ECOG PS provided by Eastern Cooperative Oncology Group) 1 - Symptomatic but completely ambulatory    Karnofsky Performance Score:  80%- Normal Activity with Effort: Some Symptoms of Disease    Labs:   Lab Results   Component Value Date    WBC 2.59 (L) 01/10/2025    HGB 10.0 (L)  01/10/2025    HCT 30.2 (L) 01/10/2025     (H) 01/10/2025    PLT 11 (LL) 01/10/2025        CMP  Sodium   Date Value Ref Range Status   01/10/2025 138 136 - 145 mmol/L Final     Potassium   Date Value Ref Range Status   01/10/2025 3.6 3.5 - 5.1 mmol/L Final     Chloride   Date Value Ref Range Status   01/10/2025 99 95 - 110 mmol/L Final     CO2   Date Value Ref Range Status   01/10/2025 28 23 - 29 mmol/L Final     Glucose   Date Value Ref Range Status   01/10/2025 266 (H) 70 - 110 mg/dL Final     BUN   Date Value Ref Range Status   01/10/2025 21 8 - 23 mg/dL Final     Creatinine   Date Value Ref Range Status   01/10/2025 1.0 0.5 - 1.4 mg/dL Final     Calcium   Date Value Ref Range Status   01/10/2025 8.5 (L) 8.7 - 10.5 mg/dL Final     Total Protein   Date Value Ref Range Status   01/10/2025 5.3 (L) 6.0 - 8.4 g/dL Final     Albumin   Date Value Ref Range Status   01/10/2025 3.0 (L) 3.5 - 5.2 g/dL Final     Total Bilirubin   Date Value Ref Range Status   01/10/2025 1.1 (H) 0.1 - 1.0 mg/dL Final     Comment:     For infants and newborns, interpretation of results should be based  on gestational age, weight and in agreement with clinical  observations.    Premature Infant recommended reference ranges:  Up to 24 hours.............<8.0 mg/dL  Up to 48 hours............<12.0 mg/dL  3-5 days..................<15.0 mg/dL  6-29 days.................<15.0 mg/dL       Alkaline Phosphatase   Date Value Ref Range Status   01/10/2025 110 40 - 150 U/L Final     AST   Date Value Ref Range Status   01/10/2025 56 (H) 10 - 40 U/L Final     ALT   Date Value Ref Range Status   01/10/2025 113 (H) 10 - 44 U/L Final     Anion Gap   Date Value Ref Range Status   01/10/2025 11 8 - 16 mmol/L Final     eGFR   Date Value Ref Range Status   01/10/2025 >60 >60 mL/min/1.73 m^2 Final          Imaging:   Hospital imaging reviewed.    Pathology:  Prior pathology reviewed. Plan for day +30 bone marrow biopsy on 10/23/24    Acute GVHD  Scoring:  GVHD Acute Assessment      No GVHD at this time.               Assessment and Plan:   Guillaume Salinas (Guillaume) is a pleasant 69 y.o.male with a past medical history of MDS s/p haplo SCT who presents for post-transplant follow up.    MDS: Status post treatment with azacitidine 75 mg/m2 daily x7 days plus venetoclax 100mg (voriconazole) daily x14 of 28 days.Venetoclax decreased to 7 days with cycle 3 until completion of 11 cycles. No plans for maintenance at this time.     Status post allogeneic stem cell transplantation: As noted above, status post haploidentical stem cell transplantation conditioned with FluMel 100 + PTCy+ 2Gy TBI . Currently Day+ 113. Engrafted on 10/09/24 day +20.  Day 30 bone marrow (11/5/2024) showing mildly hypercellular marrow with no increased blast (0.3%) and no evidence of myeloid neoplasm. Pending chimerisms, NGS, and CG  Day 100 bone marrow biopsy on 12/31/24 showed 30-40% cellularity, erythroid hyperplasia, dyserythropoiesis, decreased megakaryocytes with atypical morphology. No hemophagocytosis mentioned. NGS, FISH, and CG normal. 100% chimerisms.   Central line was removed in patient.     3.    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 He started budesonide 3mg TID on 10/28/24. Due to persistent nausea despite budesonide so started systemic steroids 11/1 at 0.5 mg/kg and his steroid taper has been given to him. Steroid taper completed 12/8/24. No evidence of aGVHD today. 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 Taper listed below.  Current tacro dose: 1 capsule (0.5 mg) in the morning and 1 capsules (0.5 mg) in the evening.   Last tacro level: 8.3 on 1/6/25  Adjustments: Pending results today  1/7 - 1/10: Prednisone 4  tablets (40mg) once a day   1/11 - 1/14: Prednisone 2 tablets (20mg) once a day   1/15 - 1/18: Prednisone 1 tablet (10mg) once a day  1/19 - 1/23: Prednisone 0.5 tablet (0.5mg) once a day  1/24 - 1/27: Prednisone 0.5 tablet (0.5mg) every other day  1/28: STOP     4.     Immunosuppression: Prevention with posaconazole, acyclovir. CMV prophylaxis with letermovir. PJP prophyalxis atovaquone. Continue weekly monitoring of CMV and EBV.  Last CMV: Not-detected,   last EBV: pending today  Active infections: N/A    Pancytopenia: Due to underlying disease and chemotherapy. Transfuse for Hgb <7 g/dL and platelets <10k. Folate and copper deficient, on supplementation. Will continue to monitor to see if platelets begin to rise with his supplements. Promacta sent in on 12/9/24. Working on aditya for financial assistance for promacta. Change TMP/SMX to atovaquone.   Folic Acid deficiency: Continue folic acid 1mg daily  Copper deficiency: Copper level of 635, continue copper gluconate 2mg daily     Post transplant lymphoproliferative disorder: Patient received rituxan on 12/30/24 due to possible PTLD with elevated EBV and possible HLH    Hypomagnesemia: Related to tacro, poor po intake. Continue MagOx to 800mg twice daily.      Chemotherapy Induced Nausea: Resolved with steroids. Tapered budesonide. Patient has his taper in [place].   Taper ended 12/8/24  New Taper from hospitalization listed above    Acute kidney injury, hypotension: Patient was suppose to receive IV fluid after last visit but when to the ER where he was admitted and received fluids. Todays CMP showing ADDISON resolved.      Anxiety, Restless Leg Syndrome: Noted since discharge by family. He started ropinirole 0.5mg and has experienced significant improvement.    Insomnia: Patient with significant improvement. Still wakes up a couple of times during the night but feels well rested in the mornings. Continue Ambien and Ropinirole for RLS.     Follow up: Every other week  with weekly labs    Orders Placed:             Medical Complexity:   Visit today included increased complexity associated with the care of the episodic problems addressed above and managing the longitudinal care of the patient due to the serious and/or complex managed problem(s) history of allo SCT.      Follow Up: Every other week with weekly labs       BMT Chart Routing      Follow up with physician . Every other week   Follow up with [facility]    Provider visit type    Infusion scheduling note    Injection scheduling note    Labs CBC, CMP, CMV, EBV, magnesium, phosphorus, type and screen and tacrolimus level   Scheduling:  Preferred lab:  Lab interval: once a week  Would like labs in [place] on week he does not have to come to main campus   Imaging    Pharmacy appointment    Other referrals               A total of 50 minutes was spent in pre-visit chart review, personal interpretation of labs and imaging, and medication review. Total visit time 40 minutes, >50 % counseling.       Gilma Ugalde PA-C  Malignant Hematology, Stem Cell Transplant, and Cellular Therapy  [facility]  [facility]

## 2025-01-10 ENCOUNTER — TELEPHONE (OUTPATIENT)
Dept: HEMATOLOGY/ONCOLOGY | Facility: CLINIC | Age: 70
End: 2025-01-10
Payer: MEDICARE

## 2025-01-10 ENCOUNTER — LAB VISIT (OUTPATIENT)
Dept: LAB | Facility: HOSPITAL | Age: 70
End: 2025-01-10
Attending: INTERNAL MEDICINE
Payer: MEDICARE

## 2025-01-10 ENCOUNTER — PATIENT MESSAGE (OUTPATIENT)
Dept: HEMATOLOGY/ONCOLOGY | Facility: CLINIC | Age: 70
End: 2025-01-10

## 2025-01-10 ENCOUNTER — OFFICE VISIT (OUTPATIENT)
Dept: HEMATOLOGY/ONCOLOGY | Facility: CLINIC | Age: 70
End: 2025-01-10
Payer: MEDICARE

## 2025-01-10 DIAGNOSIS — Z76.82 STEM CELL TRANSPLANT CANDIDATE: ICD-10-CM

## 2025-01-10 DIAGNOSIS — T45.1X5A CHEMOTHERAPY-INDUCED NAUSEA AND VOMITING: ICD-10-CM

## 2025-01-10 DIAGNOSIS — R11.2 CHEMOTHERAPY-INDUCED NAUSEA AND VOMITING: ICD-10-CM

## 2025-01-10 DIAGNOSIS — D84.81 IMMUNODEFICIENCY DUE TO CONDITIONS CLASSIFIED ELSEWHERE: ICD-10-CM

## 2025-01-10 DIAGNOSIS — D46.9 MYELODYSPLASTIC SYNDROME: Primary | ICD-10-CM

## 2025-01-10 DIAGNOSIS — D46.9 MYELODYSPLASTIC SYNDROME: ICD-10-CM

## 2025-01-10 DIAGNOSIS — E83.42 HYPOMAGNESEMIA: ICD-10-CM

## 2025-01-10 DIAGNOSIS — N17.9 ACUTE KIDNEY INJURY: ICD-10-CM

## 2025-01-10 DIAGNOSIS — D47.Z1 PTLD (POST-TRANSPLANT LYMPHOPROLIFERATIVE DISORDER): ICD-10-CM

## 2025-01-10 DIAGNOSIS — D61.818 PANCYTOPENIA: ICD-10-CM

## 2025-01-10 DIAGNOSIS — Z94.81 S/P ALLOGENEIC BONE MARROW TRANSPLANT: ICD-10-CM

## 2025-01-10 DIAGNOSIS — G47.00 INSOMNIA, UNSPECIFIED TYPE: ICD-10-CM

## 2025-01-10 DIAGNOSIS — G25.81 RESTLESS LEG SYNDROME: ICD-10-CM

## 2025-01-10 DIAGNOSIS — D89.813 GVHD (GRAFT VERSUS HOST DISEASE): ICD-10-CM

## 2025-01-10 LAB
ABO + RH BLD: NORMAL
BLD GP AB SCN CELLS X3 SERPL QL: NORMAL
MAGNESIUM SERPL-MCNC: 1.6 MG/DL (ref 1.6–2.6)
PHOSPHATE SERPL-MCNC: 2.8 MG/DL (ref 2.7–4.5)
SPECIMEN OUTDATE: NORMAL

## 2025-01-10 PROCEDURE — 86901 BLOOD TYPING SEROLOGIC RH(D): CPT | Performed by: INTERNAL MEDICINE

## 2025-01-10 PROCEDURE — 87799 DETECT AGENT NOS DNA QUANT: CPT | Performed by: INTERNAL MEDICINE

## 2025-01-10 PROCEDURE — 36415 COLL VENOUS BLD VENIPUNCTURE: CPT | Performed by: INTERNAL MEDICINE

## 2025-01-10 PROCEDURE — 80197 ASSAY OF TACROLIMUS: CPT | Performed by: INTERNAL MEDICINE

## 2025-01-10 PROCEDURE — 84100 ASSAY OF PHOSPHORUS: CPT | Performed by: INTERNAL MEDICINE

## 2025-01-10 PROCEDURE — 83735 ASSAY OF MAGNESIUM: CPT | Performed by: INTERNAL MEDICINE

## 2025-01-11 LAB — TACROLIMUS BLD-MCNC: 10 NG/ML (ref 5–15)

## 2025-01-13 ENCOUNTER — TELEPHONE (OUTPATIENT)
Dept: HEMATOLOGY/ONCOLOGY | Facility: CLINIC | Age: 70
End: 2025-01-13
Payer: MEDICARE

## 2025-01-13 ENCOUNTER — LAB VISIT (OUTPATIENT)
Dept: LAB | Facility: HOSPITAL | Age: 70
End: 2025-01-13
Attending: INTERNAL MEDICINE
Payer: MEDICARE

## 2025-01-13 ENCOUNTER — DOCUMENTATION ONLY (OUTPATIENT)
Dept: HEMATOLOGY/ONCOLOGY | Facility: CLINIC | Age: 70
End: 2025-01-13
Payer: MEDICARE

## 2025-01-13 DIAGNOSIS — D46.9 MYELODYSPLASTIC SYNDROME: ICD-10-CM

## 2025-01-13 DIAGNOSIS — Z76.82 STEM CELL TRANSPLANT CANDIDATE: ICD-10-CM

## 2025-01-13 LAB
ABO GROUP BLD: NORMAL
ALBUMIN SERPL BCP-MCNC: 3 G/DL (ref 3.5–5.2)
ALP SERPL-CCNC: 131 U/L (ref 40–150)
ALT SERPL W/O P-5'-P-CCNC: 100 U/L (ref 10–44)
ANION GAP SERPL CALC-SCNC: 9 MMOL/L (ref 8–16)
ANISOCYTOSIS BLD QL SMEAR: SLIGHT
AST SERPL-CCNC: 48 U/L (ref 10–40)
BASOPHILS NFR BLD: 0 % (ref 0–1.9)
BILIRUB SERPL-MCNC: 1.1 MG/DL (ref 0.1–1)
BLD GP AB SCN CELLS X3 SERPL QL: NORMAL
BUN SERPL-MCNC: 17 MG/DL (ref 8–23)
CALCIUM SERPL-MCNC: 9.1 MG/DL (ref 8.7–10.5)
CHLORIDE SERPL-SCNC: 102 MMOL/L (ref 95–110)
CMV DNA SPEC QL NAA+PROBE: NORMAL
CO2 SERPL-SCNC: 29 MMOL/L (ref 23–29)
CREAT SERPL-MCNC: 0.9 MG/DL (ref 0.5–1.4)
CYTOMEGALOVIRUS PCR, QUANT: NOT DETECTED IU/ML
DIFFERENTIAL METHOD BLD: ABNORMAL
EOSINOPHIL NFR BLD: 2 % (ref 0–8)
EPSTEIN-BARR VIRUS DNA: ABNORMAL
EPSTEIN-BARR VIRUS LOG (IU/ML): 2.15 LOGIU/ML
EPSTEIN-BARR VIRUS PCR, QUANT: 141 IU/ML
ERYTHROCYTE [DISTWIDTH] IN BLOOD BY AUTOMATED COUNT: 21.9 % (ref 11.5–14.5)
EST. GFR  (NO RACE VARIABLE): >60 ML/MIN/1.73 M^2
GLUCOSE SERPL-MCNC: 177 MG/DL (ref 70–110)
HCT VFR BLD AUTO: 32.5 % (ref 40–54)
HGB BLD-MCNC: 10.6 G/DL (ref 14–18)
IMM GRANULOCYTES # BLD AUTO: ABNORMAL K/UL (ref 0–0.04)
IMM GRANULOCYTES NFR BLD AUTO: ABNORMAL % (ref 0–0.5)
LYMPHOCYTES NFR BLD: 52 % (ref 18–48)
MAGNESIUM SERPL-MCNC: 1.4 MG/DL (ref 1.6–2.6)
MCH RBC QN AUTO: 36.4 PG (ref 27–31)
MCHC RBC AUTO-ENTMCNC: 32.6 G/DL (ref 32–36)
MCV RBC AUTO: 112 FL (ref 82–98)
MONOCYTES NFR BLD: 4 % (ref 4–15)
NEUTROPHILS NFR BLD: 42 % (ref 38–73)
NRBC BLD-RTO: 3 /100 WBC
PHOSPHATE SERPL-MCNC: 2.4 MG/DL (ref 2.7–4.5)
PLATELET # BLD AUTO: 12 K/UL (ref 150–450)
PLATELET BLD QL SMEAR: ABNORMAL
PMV BLD AUTO: ABNORMAL FL (ref 9.2–12.9)
POLYCHROMASIA BLD QL SMEAR: ABNORMAL
POTASSIUM SERPL-SCNC: 3.8 MMOL/L (ref 3.5–5.1)
PROT SERPL-MCNC: 5.3 G/DL (ref 6–8.4)
RBC # BLD AUTO: 2.91 M/UL (ref 4.6–6.2)
RH BLD: NORMAL
SODIUM SERPL-SCNC: 140 MMOL/L (ref 136–145)
SPECIMEN OUTDATE: NORMAL
WBC # BLD AUTO: 1.59 K/UL (ref 3.9–12.7)

## 2025-01-13 PROCEDURE — 84100 ASSAY OF PHOSPHORUS: CPT | Performed by: INTERNAL MEDICINE

## 2025-01-13 PROCEDURE — 85027 COMPLETE CBC AUTOMATED: CPT | Performed by: INTERNAL MEDICINE

## 2025-01-13 PROCEDURE — 86901 BLOOD TYPING SEROLOGIC RH(D): CPT | Performed by: INTERNAL MEDICINE

## 2025-01-13 PROCEDURE — 80053 COMPREHEN METABOLIC PANEL: CPT | Performed by: INTERNAL MEDICINE

## 2025-01-13 PROCEDURE — 85007 BL SMEAR W/DIFF WBC COUNT: CPT | Performed by: INTERNAL MEDICINE

## 2025-01-13 PROCEDURE — 80197 ASSAY OF TACROLIMUS: CPT | Performed by: INTERNAL MEDICINE

## 2025-01-13 PROCEDURE — 83735 ASSAY OF MAGNESIUM: CPT | Performed by: INTERNAL MEDICINE

## 2025-01-13 PROCEDURE — 86900 BLOOD TYPING SEROLOGIC ABO: CPT | Performed by: INTERNAL MEDICINE

## 2025-01-13 PROCEDURE — 86850 RBC ANTIBODY SCREEN: CPT | Performed by: INTERNAL MEDICINE

## 2025-01-13 NOTE — PROGRESS NOTES
"[facility]   Physical Therapy Treatment Note      Name: Guillaume Amezcuaona  Clinic Number: [id number]    Therapy Diagnosis:   Encounter Diagnosis   Name Primary?    Decreased strength, endurance, and mobility Yes     Physician: Mohit Hickey MD    Visit Date: 1/14/2025    Physician Orders: PT Eval and Treat   Medical Diagnosis from Referral: Z94.81 (ICD-10-CM) - S/P allogeneic bone marrow transplant   Evaluation Date: 12/20/2024  Authorization Period Expiration: 12/09/2025   Plan of Care Expiration: 3/20/25  Progress Note Due: 1/20/25  Date of Surgery: 9/19/14  Visit # / Visits authorized: 1/ 10 +eval  FOTO: 1/ 3     Precautions: Standard and HTN,Myelodysplastic syndrome, PVD, CAD, chemotherapy        Time In: 1:00p  Time Out: 1:53p  Total Billable Time: 30 minutes    PTA Visit #: 0/5       Subjective     Patient reports: He has been very tired since his recent hospitalization. He has been walking a little bit with his walker at home. Overall, he is just very fatigued.  He was not compliant with home exercise program.  Response to previous treatment: 1st treatment  Functional change: recent hospitalization     Pain: 0/10  Location: bilateral legs     Objective      Objective Measures updated at progress report unless specified.     Treatment     Guillaume received the treatments listed below:        neuromuscular re-education activities to improve: Balance, Coordination, Proprioception, and Posture for 20 minutes. The following activities were included:  Seated hip adduction with ball 5" 2x10  Seated clams Red theraband 2x10   Supine Straight leg raise 2x10 ea  Bridges 2x10    therapeutic activities to improve functional performance for 33  minutes, including:  Nustep x 8 minutes  Seated march x3 minutes 13lb  Seated long arc quad 3lb 2 minutes ea  Sit to stand from raised mat x10  Standing hip abduction 2x10 ea  2 minute walk around clinic (2 laps)  Patient/family education "       Patient Education and Home Exercises       Education provided:   - Home exercise program as tolerated    Written Home Exercises Provided: Pt instructed to continue prior HEP. Exercises were reviewed and Guillaume was able to demonstrate them prior to the end of the session.  Guillaume demonstrated good  understanding of the education provided. See Electronic Medical Record under Patient Instructions for exercises provided during therapy sessions    Assessment     Mr. Galaviz was seen for his first follow up since initial evaluation due to a recent hospitalization. He arrived to today's session with fatigue. Patient's wife was present for the duration of the session to assist with translation. Patient would require periodic rest breaks during session due to fatigue.     Guillaume Is progressing well towards his goals.   Patient prognosis is Fair.     Patient will continue to benefit from skilled outpatient physical therapy to address the deficits listed in the problem list box on initial evaluation, provide pt/family education and to maximize pt's level of independence in the home and community environment.     Patient's spiritual, cultural and educational needs considered and pt agreeable to plan of care and goals.     Anticipated barriers to physical therapy: co-morbidities    Goals: Short Term Goals: 6 weeks   Patient will be independent with Home exercise program to supplement therapy progressing, not met   Patient will be able to ambulate 1300 ft with 6 Minute walk test to improve endurance for community mobility progressing, not met  Patient will be able to lift and carry groceries without difficulty to improve ability to perform functional tasks progressing, not met     Long Term Goals: 12 weeks   Patient will be able to ambulate 1400 ft or more with 6 Minute walk test to improve endurance for community mobility progressing, not met  Patient will be able to perform 16 sit to stand on 30 second sit to   order to improve functional strength and endurance for transfers progressing, not met  Patient will improve FOTO score to 65 % to improve self perceived ability to perform functional tasks progressing, not met  Patient goal: Patient will be able to mow his lawn to return to prior level of function progressing, not met    Plan     Improve functional strength and endurance    Elvira Joyce, PT ,DPT,OCS  01/14/2025

## 2025-01-14 ENCOUNTER — DOCUMENTATION ONLY (OUTPATIENT)
Dept: HEMATOLOGY/ONCOLOGY | Facility: CLINIC | Age: 70
End: 2025-01-14
Payer: MEDICARE

## 2025-01-14 ENCOUNTER — CLINICAL SUPPORT (OUTPATIENT)
Dept: REHABILITATION | Facility: HOSPITAL | Age: 70
End: 2025-01-14
Payer: MEDICARE

## 2025-01-14 DIAGNOSIS — Z74.09 DECREASED STRENGTH, ENDURANCE, AND MOBILITY: Primary | ICD-10-CM

## 2025-01-14 DIAGNOSIS — R68.89 DECREASED STRENGTH, ENDURANCE, AND MOBILITY: Primary | ICD-10-CM

## 2025-01-14 DIAGNOSIS — R53.1 DECREASED STRENGTH, ENDURANCE, AND MOBILITY: Primary | ICD-10-CM

## 2025-01-14 LAB — TACROLIMUS BLD-MCNC: 8.9 NG/ML (ref 5–15)

## 2025-01-14 PROCEDURE — 97112 NEUROMUSCULAR REEDUCATION: CPT | Mod: PN

## 2025-01-14 PROCEDURE — 97530 THERAPEUTIC ACTIVITIES: CPT | Mod: PN

## 2025-01-14 NOTE — PHYSICIAN QUERY
Please clarify the nutritional diagnosis associated with the clinical findings   Malnutrition, unspecified degree

## 2025-01-16 ENCOUNTER — PATIENT MESSAGE (OUTPATIENT)
Dept: RESEARCH | Facility: HOSPITAL | Age: 70
End: 2025-01-16
Payer: MEDICARE

## 2025-01-16 ENCOUNTER — PATIENT MESSAGE (OUTPATIENT)
Dept: HEMATOLOGY/ONCOLOGY | Facility: CLINIC | Age: 70
End: 2025-01-16

## 2025-01-16 ENCOUNTER — LAB VISIT (OUTPATIENT)
Dept: LAB | Facility: HOSPITAL | Age: 70
End: 2025-01-16
Attending: INTERNAL MEDICINE
Payer: MEDICARE

## 2025-01-16 ENCOUNTER — CLINICAL SUPPORT (OUTPATIENT)
Dept: HEMATOLOGY/ONCOLOGY | Facility: CLINIC | Age: 70
End: 2025-01-16
Payer: MEDICARE

## 2025-01-16 ENCOUNTER — TELEPHONE (OUTPATIENT)
Dept: HEMATOLOGY/ONCOLOGY | Facility: CLINIC | Age: 70
End: 2025-01-16

## 2025-01-16 DIAGNOSIS — Z76.82 STEM CELL TRANSPLANT CANDIDATE: ICD-10-CM

## 2025-01-16 DIAGNOSIS — D64.9 SYMPTOMATIC ANEMIA: ICD-10-CM

## 2025-01-16 DIAGNOSIS — D46.9 MYELODYSPLASTIC SYNDROME: ICD-10-CM

## 2025-01-16 DIAGNOSIS — R35.0 URINARY FREQUENCY: Primary | ICD-10-CM

## 2025-01-16 LAB
ABO + RH BLD: NORMAL
ALBUMIN SERPL BCP-MCNC: 2.8 G/DL (ref 3.5–5.2)
ALP SERPL-CCNC: 130 U/L (ref 40–150)
ALT SERPL W/O P-5'-P-CCNC: 87 U/L (ref 10–44)
ANION GAP SERPL CALC-SCNC: 7 MMOL/L (ref 8–16)
ANISOCYTOSIS BLD QL SMEAR: SLIGHT
AST SERPL-CCNC: 42 U/L (ref 10–40)
BASOPHILS # BLD AUTO: 0 K/UL (ref 0–0.2)
BASOPHILS NFR BLD: 0 % (ref 0–1.9)
BILIRUB SERPL-MCNC: 0.8 MG/DL (ref 0.1–1)
BLD GP AB SCN CELLS X3 SERPL QL: NORMAL
BUN SERPL-MCNC: 13 MG/DL (ref 8–23)
CALCIUM SERPL-MCNC: 8.7 MG/DL (ref 8.7–10.5)
CHLORIDE SERPL-SCNC: 105 MMOL/L (ref 95–110)
CMV DNA SPEC QL NAA+PROBE: NORMAL
CO2 SERPL-SCNC: 28 MMOL/L (ref 23–29)
CREAT SERPL-MCNC: 0.9 MG/DL (ref 0.5–1.4)
CYTOMEGALOVIRUS PCR, QUANT: NOT DETECTED IU/ML
DACRYOCYTES BLD QL SMEAR: ABNORMAL
DIFFERENTIAL METHOD BLD: ABNORMAL
EOSINOPHIL # BLD AUTO: 0 K/UL (ref 0–0.5)
EOSINOPHIL NFR BLD: 1 % (ref 0–8)
EPSTEIN-BARR VIRUS DNA: ABNORMAL
EPSTEIN-BARR VIRUS LOG (IU/ML): 1.78 LOGIU/ML
EPSTEIN-BARR VIRUS PCR, QUANT: 60 IU/ML
ERYTHROCYTE [DISTWIDTH] IN BLOOD BY AUTOMATED COUNT: 22.3 % (ref 11.5–14.5)
EST. GFR  (NO RACE VARIABLE): >60 ML/MIN/1.73 M^2
GLUCOSE SERPL-MCNC: 155 MG/DL (ref 70–110)
HCT VFR BLD AUTO: 30.7 % (ref 40–54)
HGB BLD-MCNC: 10.1 G/DL (ref 14–18)
HYPOCHROMIA BLD QL SMEAR: ABNORMAL
IMM GRANULOCYTES # BLD AUTO: 0.03 K/UL (ref 0–0.04)
IMM GRANULOCYTES NFR BLD AUTO: 2.9 % (ref 0–0.5)
LYMPHOCYTES # BLD AUTO: 0.7 K/UL (ref 1–4.8)
LYMPHOCYTES NFR BLD: 67 % (ref 18–48)
MAGNESIUM SERPL-MCNC: 1.3 MG/DL (ref 1.6–2.6)
MCH RBC QN AUTO: 37.7 PG (ref 27–31)
MCHC RBC AUTO-ENTMCNC: 32.9 G/DL (ref 32–36)
MCV RBC AUTO: 115 FL (ref 82–98)
MONOCYTES # BLD AUTO: 0 K/UL (ref 0.3–1)
MONOCYTES NFR BLD: 1.9 % (ref 4–15)
NEUTROPHILS # BLD AUTO: 0.3 K/UL (ref 1.8–7.7)
NEUTROPHILS NFR BLD: 27.2 % (ref 38–73)
NRBC BLD-RTO: 0 /100 WBC
OVALOCYTES BLD QL SMEAR: ABNORMAL
PHOSPHATE SERPL-MCNC: 2.9 MG/DL (ref 2.7–4.5)
PLATELET # BLD AUTO: 13 K/UL (ref 150–450)
PLATELET BLD QL SMEAR: ABNORMAL
PMV BLD AUTO: ABNORMAL FL (ref 9.2–12.9)
POIKILOCYTOSIS BLD QL SMEAR: SLIGHT
POLYCHROMASIA BLD QL SMEAR: ABNORMAL
POTASSIUM SERPL-SCNC: 4.4 MMOL/L (ref 3.5–5.1)
PROT SERPL-MCNC: 5 G/DL (ref 6–8.4)
RBC # BLD AUTO: 2.68 M/UL (ref 4.6–6.2)
SCHISTOCYTES BLD QL SMEAR: ABNORMAL
SODIUM SERPL-SCNC: 140 MMOL/L (ref 136–145)
SPECIMEN OUTDATE: NORMAL
TACROLIMUS BLD-MCNC: 5.3 NG/ML (ref 5–15)
WBC # BLD AUTO: 1.03 K/UL (ref 3.9–12.7)

## 2025-01-16 PROCEDURE — 36415 COLL VENOUS BLD VENIPUNCTURE: CPT | Performed by: INTERNAL MEDICINE

## 2025-01-16 PROCEDURE — 83735 ASSAY OF MAGNESIUM: CPT | Performed by: INTERNAL MEDICINE

## 2025-01-16 PROCEDURE — 86900 BLOOD TYPING SEROLOGIC ABO: CPT | Performed by: INTERNAL MEDICINE

## 2025-01-16 PROCEDURE — 80053 COMPREHEN METABOLIC PANEL: CPT | Performed by: INTERNAL MEDICINE

## 2025-01-16 PROCEDURE — 84100 ASSAY OF PHOSPHORUS: CPT | Performed by: INTERNAL MEDICINE

## 2025-01-16 PROCEDURE — 85025 COMPLETE CBC W/AUTO DIFF WBC: CPT | Performed by: INTERNAL MEDICINE

## 2025-01-16 PROCEDURE — 80197 ASSAY OF TACROLIMUS: CPT | Performed by: INTERNAL MEDICINE

## 2025-01-16 PROCEDURE — 87799 DETECT AGENT NOS DNA QUANT: CPT | Performed by: INTERNAL MEDICINE

## 2025-01-16 RX ORDER — TACROLIMUS 0.5 MG/1
CAPSULE ORAL
Qty: 90 CAPSULE | Refills: 5 | Status: SHIPPED | OUTPATIENT
Start: 2025-01-16

## 2025-01-16 NOTE — PROGRESS NOTES
BMT Pharmacist Medication Review Note     All current medications were reviewed with the patient and wife. The patient brought in all of his medications with him to this visit.      We discussed the changes made since last meeting:   - Prednisone to finish on 1/28.     Symptoms addressed  - The patient is experiencing frequent urination, approximately every 15 minutes. I spoke with Dr. Hickey and Gilma, and they will order a urinalysis. - The leg pain is currently manageable. I advised the patient that if the pain becomes too bothersome, they should inform the team, and we can arrange for pain medication.     The patient has an ample supply of all medications. They stated that they requested acyclovir and magnesium refills at the pharmacy.      Medicamentos/Medications Indicación/Indication Mañana/Morning Tarde/Afternoon Noche/Night   Acyclovir 800mg  Prevención de infecciones virales  Viral infection prevention 1 tableta  1 tableta   **Levofloxacin 500mg Prevencón de infecciones bacteria  Bacterial infection prevention 1 tableta     Letermovir (Prevymis®) 480mg Prevención de infección por CMV  CMV infection prevention 1 tableta     Posaconazole 100mg Prevencón de infecciones fungales  Fungal infection prevention 3 tabletas     **Atovaquone (Mepron) 750mg/5mL Prevención de neumonía fungal   Fungal pneumonia prevention 10mL        **Tacrolimus 0.5mg Prevención de GVHD - NO tome la dosis de por la mañana en días que se relizará laboratorios  1 cápsula  1 cápsula   *Prednisone 10mg GVHD 1/7 - 1/10 Prednisone 4 tabletas (40mg) ivette vez al día   1/11 - 1/14 Prednisone 2 tabletas (20mg) ivette vez al día   1/15 - 1/18 Prednisone 1 tableta (10mg) ivette vez al día   1/19 - 1/23 Prednisone ½ tableta (5mg) ivette vez al día   1/24 - 1/27 Prednisone ½ tableta (5mg) un día si y otro no   1/28 Pare de ansley      Eltrombopag (Promacta) 50 mg [place]  1 tableta     Magnesium oxide 400mg Suplemento de magnesio 2 tabletas  2 tabletas       Ropinirole (Requip) 0.5 mg Síndrome de las piernas inquietas   1 tableta   Pantoprazole (Protonix) Reflujo  1 tableta     Carvedilol (Coreg®) 6.25 mg Presión arterial maeve  1 tableta  1 tableta   Gabapentin (Neurontin®) 300 mg Neuropatía   2 cápsulas   Copper Gluconate 2 mg Suplemento 1 cápsula     Folic Acid 1 mg Suplemento 1 tableta     **medicamento nuevo o cambios realizados al medicamento    MEDICAMENTOS CUANDO VI NECESARIOS/AS NEEDED MEDICATIONS:                                                                    Loperamide (Imodium) 2mg cada 6 horas según sea necesario para la diarrea  Zolpidem (Ambien) 5 mg tableta todas las noches según sea necesario para dormir   Ondansetron (Zofran) 8 mg cada 8 horas cuando sea necesario para las náuseas     Hold until told to restart/No utilize hasta que se le ordene comenzar de nuevo: Cilostazol        All questions were answered.      Terese Cantor, PharmD  Clinical Pharmacy Specialist, Bone Marrow Transplant/Hematology  Spectra link: [phone]

## 2025-01-17 ENCOUNTER — CLINICAL SUPPORT (OUTPATIENT)
Dept: REHABILITATION | Facility: HOSPITAL | Age: 70
End: 2025-01-17
Payer: MEDICARE

## 2025-01-17 DIAGNOSIS — R68.89 DECREASED STRENGTH, ENDURANCE, AND MOBILITY: Primary | ICD-10-CM

## 2025-01-17 DIAGNOSIS — Z74.09 DECREASED STRENGTH, ENDURANCE, AND MOBILITY: Primary | ICD-10-CM

## 2025-01-17 DIAGNOSIS — R53.1 DECREASED STRENGTH, ENDURANCE, AND MOBILITY: Primary | ICD-10-CM

## 2025-01-17 PROCEDURE — 97112 NEUROMUSCULAR REEDUCATION: CPT | Mod: PN

## 2025-01-17 PROCEDURE — 97530 THERAPEUTIC ACTIVITIES: CPT | Mod: PN

## 2025-01-17 NOTE — PROGRESS NOTES
"[facility]   Physical Therapy Treatment Note      Name: Guillaume Salinas  Clinic Number: [id number]    Therapy Diagnosis:   Encounter Diagnosis   Name Primary?    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 He wanted to walk without his assistive device today.   He was not compliant with home exercise program.  Response to previous treatment: tired  Functional change: recent hospitalization     Pain: 0/10  Location: bilateral legs     Objective      Objective Measures updated at progress report unless specified.     Treatment     Guillaume received the treatments listed below:        neuromuscular re-education activities to improve: Balance, Coordination, Proprioception, and Posture for 15 minutes. The following activities were included:  Seated hip adduction with ball 5" 2x10  Seated clams Red theraband 2x10   Supine Straight leg raise 2x10 ea  Bridges 2x10    therapeutic activities to improve functional performance for 30  minutes, including:  Nustep x 8 minutes  Seated march x3 minutes 3lb  Seated long arc quad 3lb 2 minutes ea  Sit to stand from raised mat x10  Standing hip abduction 2x10 ea  2 minute walk around clinic (2 laps) NT  Patient/family education       Patient Education and Home Exercises       Education provided: "   - Home exercise program as tolerated    Written Home Exercises Provided: Pt instructed to continue prior HEP. Exercises were reviewed and Guillaume was able to demonstrate them prior to the end of the session.  Guillaume demonstrated good  understanding of the education provided. See Electronic Medical Record under Patient Instructions for exercises provided during therapy sessions    Assessment     Mr. Galaviz arrived to today's session without assistive device and holding onto his wife while ambulating. Patient's wife was present for the duration of the session and translated as needed. Ambulating around the clinic was not performed today but rather the Nustep was performed for cardio and endurance. He was challenged and fatigued throughout session and required some rest breaks to recover.     Guillaume Is progressing well towards his goals.   Patient prognosis is Fair.     Patient will continue to benefit from skilled outpatient physical therapy to address the deficits listed in the problem list box on initial evaluation, provide pt/family education and to maximize pt's level of independence in the home and community environment.     Patient's spiritual, cultural and educational needs considered and pt agreeable to plan of care and goals.     Anticipated barriers to physical therapy: co-morbidities    Goals: Short Term Goals: 6 weeks   Patient will be independent with Home exercise program to supplement therapy progressing, not met   Patient will be able to ambulate 1300 ft with 6 Minute walk test to improve endurance for community mobility progressing, not met  Patient will be able to lift and carry groceries without difficulty to improve ability to perform functional tasks progressing, not met     Long Term Goals: 12 weeks   Patient will be able to ambulate 1400 ft or more with 6 Minute walk test to improve endurance for community mobility progressing, not met  Patient will be able to perform 16 sit to stand on 30  second sit to  order to improve functional strength and endurance for transfers progressing, not met  Patient will improve FOTO score to 65 % to improve self perceived ability to perform functional tasks progressing, not met  Patient goal: Patient will be able to mow his lawn to return to prior level of function progressing, not met    Plan     Improve functional strength and endurance    Elvira Joyce, PT ,DPT,OCS  01/17/2025

## 2025-01-23 DIAGNOSIS — D61.818 PANCYTOPENIA: Primary | ICD-10-CM

## 2025-01-23 DIAGNOSIS — D53.9 NUTRITIONAL ANEMIA, UNSPECIFIED: ICD-10-CM

## 2025-01-23 NOTE — PROGRESS NOTES
"[facility]   Physical Therapy Treatment Note      Name: Guillaume Salinas  Clinic Number: [id number]    Therapy Diagnosis:   No diagnosis found.    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 He wanted to walk without his assistive device today.   He was not compliant with home exercise program.  Response to previous treatment: tired  Functional change: recent hospitalization     Pain: 0/10  Location: bilateral legs     Objective      Objective Measures updated at progress report unless specified.     Treatment     Guillaume received the treatments listed below:        neuromuscular re-education activities to improve: Balance, Coordination, Proprioception, and Posture for *** minutes. The following activities were included:  Seated hip adduction with ball 5" 2x10  Seated clams Red theraband 2x10   Supine Straight leg raise 2x10 ea  Bridges 2x10    therapeutic activities to improve functional performance for ***  minutes, including:  Nustep x 8 minutes  Seated march x3 minutes 3lb  Seated long arc quad 3lb 2 minutes ea  Sit to stand from raised mat x10  Standing hip abduction 2x10 ea  2 minute walk around clinic (2 laps) NT  Patient/family education       Patient Education and Home Exercises       Education provided:   - Home exercise program as tolerated    Written Home Exercises " Provided: Pt instructed to continue prior HEP. Exercises were reviewed and Guillaume was able to demonstrate them prior to the end of the session.  Guillaume demonstrated good  understanding of the education provided. See Electronic Medical Record under Patient Instructions for exercises provided during therapy sessions    Assessment     ***Mr. Galaviz arrived to today's session without assistive device and holding onto his wife while ambulating. Patient's wife was present for the duration of the session and translated as needed. Ambulating around the clinic was not performed today but rather the Nustep was performed for cardio and endurance. He was challenged and fatigued throughout session and required some rest breaks to recover.     Guillaume Is progressing well towards his goals.   Patient prognosis is Fair.     Patient will continue to benefit from skilled outpatient physical therapy to address the deficits listed in the problem list box on initial evaluation, provide pt/family education and to maximize pt's level of independence in the home and community environment.     Patient's spiritual, cultural and educational needs considered and pt agreeable to plan of care and goals.     Anticipated barriers to physical therapy: co-morbidities    Goals: Short Term Goals: 6 weeks   Patient will be independent with Home exercise program to supplement therapy progressing, not met   Patient will be able to ambulate 1300 ft with 6 Minute walk test to improve endurance for community mobility progressing, not met  Patient will be able to lift and carry groceries without difficulty to improve ability to perform functional tasks progressing, not met     Long Term Goals: 12 weeks   Patient will be able to ambulate 1400 ft or more with 6 Minute walk test to improve endurance for community mobility progressing, not met  Patient will be able to perform 16 sit to stand on 30 second sit to  order to improve functional strength and  endurance for transfers progressing, not met  Patient will improve FOTO score to 65 % to improve self perceived ability to perform functional tasks progressing, not met  Patient goal: Patient will be able to mow his lawn to return to prior level of function progressing, not met    Plan     Improve functional strength and endurance    Elvira Joyce, PT ,DPT,OCS  01/23/2025

## 2025-01-23 NOTE — PROGRESS NOTES
Section of Hematology and Stem Cell Transplantation    Post-Transplantation Follow Up Visit       Notes:    01/24/2025    Transplant History:   Primary oncologist: Paco Hickey MD  Primary oncologic diagnosis: MDS  Transplant date: 9/19/2024  Donor: haploidentical  Blood Type (Patient): B +  Blood Type (Donor): A +  CMV (Patient): Positive  CMV (Donor): Positive  Graft source: Bone marrow  CD34+ cell dose: 3.55x10^6  Conditioning Regimen: Fludarabine plus melphalan 100 mg/m2 + 2Gy TBI  GVHD prophylaxis: Post-transplant cyclophosphamide, Tacrolimus, MMF  Immediate post-transplant complications: Patient experienced expected GI toxicities with C diff negative diarrhea and nausea, neutropenic fever with negative infectious work up, expected cytopenias requiring transfusions, electrolyte abnormalities requiring replacement, volume overload requiring diuresis, intermittent SOB from pulmonary edema requiring 02, and a hemorrhoid flare up. Diarrhea and SOB improved towards the end of the hospital stay.     History of Present Ilness:   Guillaume Salinas (Guillaume) is a pleasant 69 y.o.male with a past medical history of MDS who is status post haploidentical stem cell transplantation conditioned with FluMel 100 + PTCy+ 2Gy TBI who is currently day +127  who presents for post-transplant follow up.  services used.   Interval History:   Patient presents for routine follow-up post allogeneic transplant. He has received his promacta and has been tolerating it well. He has been having hesitancy with urination and frequency but no urgency, dysuria, or hematuria. He denies nausea, vomiting, diarrhea, and rashes. He is doing PT and is enjoying it.     PAST MEDICAL HISTORY:   Past Medical History:   Diagnosis Date    Anticoagulant long-term use     Coronary artery disease     Hypertension     Myelodysplastic syndrome     Peripheral vascular disease, unspecified        PAST SURGICAL HISTORY:   Past Surgical  History:   Procedure Laterality Date    BONE MARROW BIOPSY Left 2023    Procedure: Biopsy-bone marrow;  Surgeon: Harry Diamond MD;  Location: [facility] OR;  Service: Oncology;  Laterality: Left;    COLONOSCOPY N/A 2022    Procedure: COLONOSCOPY Golytely Vaccinated will bring cards;  Surgeon: Dereje Simon MD;  Location: [facility] ENDO;  Service: Endoscopy;  Laterality: N/A;  Do not cancel this order    INSERTION OF LEMONS CATHETER Right 2024    Procedure: INSERTION, CATHETER, CENTRAL VENOUS, LEMONS TRIPLE LUMEN;  Surgeon: Kg Patten MD;  Location: [address];  Service: General;  Laterality: Right;     PAST SOCIAL HISTORY:  Social History     Socioeconomic History    Marital status:    Tobacco Use    Smoking status: Former     Current packs/day: 0.00     Average packs/day: 0.3 packs/day for 50.0 years (12.5 ttl pk-yrs)     Types: Cigarettes     Start date: 3/1/1973     Quit date: 3/1/2023     Years since quittin.9     Passive exposure: Past    Smokeless tobacco: Never   Substance and Sexual Activity    Alcohol use: Not Currently    Drug use: Never    Sexual activity: Not Currently     Partners: Female     Social Drivers of Health     Financial Resource Strain: Patient Declined (2024)    Overall Financial Resource Strain (CARDIA)     Difficulty of Paying Living Expenses: Patient declined   Food Insecurity: Patient Declined (2024)    Hunger Vital Sign     Worried About Running Out of Food in the Last Year: Patient declined     Ran Out of Food in the Last Year: Patient declined   Transportation Needs: Patient Declined (2024)    TRANSPORTATION NEEDS     Transportation : Patient declined   Physical Activity: Inactive (1/10/2025)    Exercise Vital Sign     Days of Exercise per Week: 0 days     Minutes of Exercise per Session: 10 min   Stress: Patient Declined (2024)    [place] [employer] - Occupational Stress  Questionnaire     Feeling of Stress : Patient declined   Recent Concern: Stress - Stress Concern Present (9/30/2024)    [employer] [place] [employer] - Occupational Stress Questionnaire     Feeling of Stress : To some extent   Housing Stability: Patient Declined (12/28/2024)    Housing Stability Vital Sign     Unable to Pay for Housing in the Last Year: Patient declined     Homeless in the Last Year: Patient declined       FAMILY HISTORY:  Cancer-related family history includes Cancer in his brother and father.    CURRENT MEDICATIONS:   Medication List with Changes/Refills   Current Medications    ACYCLOVIR (ZOVIRAX) 800 MG TAB    Take 1 tablet (800 mg total) by mouth 2 (two) times daily.    ATOVAQUONE (MEPRON) 750 MG/5 ML SUSP ORAL LIQUID    Take 10 mLs (1,500 mg total) by mouth once daily.    CARVEDILOL (COREG) 6.25 MG TABLET    Take 1 tablet (6.25 mg total) by mouth 2 (two) times daily.    COPPER GLUCONATE 2 MG CAP    Take 2 mg by mouth once daily.    ELTROMBOPAG OLAMINE (PROMACTA) 50 MG TAB    Take 1 tablet (50 mg total) by mouth once daily.    FOLIC ACID (FOLVITE) 1 MG TABLET    Take 1 tablet (1 mg total) by mouth once daily.    GABAPENTIN (NEURONTIN) 300 MG CAPSULE    Take 2 capsules (600 mg total) by mouth every evening.    LETERMOVIR (PREVYMIS) 480 MG TAB    Take 1 tablet (480 mg total) by mouth Daily.    LEVOFLOXACIN (LEVAQUIN) 500 MG TABLET    Take 1 tablet (500 mg total) by mouth once daily.    LOPERAMIDE (IMODIUM A-D) 2 MG TAB    Take 2 mg by mouth 4 (four) times daily as needed.    MAGNESIUM OXIDE (MAG-OX) 400 MG (241.3 MG MAGNESIUM) TABLET    Take 2 tablets (800 mg total) by mouth 2 (two) times daily.    ONDANSETRON (ZOFRAN-ODT) 8 MG TBDL    Take 1 tablet (8 mg total) by mouth every 8 (eight) hours as needed (nausea).    PANTOPRAZOLE (PROTONIX) 40 MG TABLET    Take 1 tablet (40 mg total) by mouth once daily.    POSACONAZOLE (NOXAFIL) 100 MG TBEC TABLET    Take 3 tablets (300 mg total) by  mouth once daily.    PREDNISONE (DELTASONE) 10 MG TABLET    1/7 - 1/10: Prednisone 4 tablets (40mg) once a day; 1/11 - 1/14: Prednisone 2 tablets (20mg) once a day; 1/15 - 1/18: Prednisone 1 tablet (10mg) once a day; 1/19 - 1/23: Prednisone 0.5 tablet (0.5mg) once a day; 1/24 - 1/27: Prednisone 0.5 tablet (0.5mg) every other day; 1/28: STOP    ROPINIROLE (REQUIP) 0.5 MG TABLET    Take 1 tablet (0.5 mg total) by mouth every evening.    TACROLIMUS (PROGRAF) 0.5 MG CAP    Take 2 capsules (1 mg total) by mouth every morning AND 1 capsule (0.5 mg total) every evening.    ZOLPIDEM (AMBIEN) 5 MG TAB    Take 1 tablet (5 mg total) by mouth nightly as needed (insomnia).       ALLERGIES:   Review of patient's allergies indicates:  No Known Allergies    GVHD Review of Systems:     Pertinent positives and negatives included in the HPI. Otherwise a 14 point review of systems is negative. GVHD review of systems recorded in BMT flowsheet.     Physical Exam:     Vitals:    01/24/25 0956   BP: 111/74   Pulse: 94   Resp: 16   Temp: 97.8 °F (36.6 °C)         General: Appears well, NAD.   HEENT: MMM, no OP lesions  Pulmonary: CTAB, no increased work of breathing, no W/R/C  Cardiovascular: S1S2 normal, RRR, no M/R/G  Abdominal: Soft, NT, ND, BS+, no HSM  Extremities: No C/C/E  Neurological: AAOx4, grossly normal, no focal deficits  Dermatologic: No appreciable rashes or lesions    ECOG Performance Status: (foot note - ECOG PS provided by Eastern Cooperative Oncology Group) 1 - Symptomatic but completely ambulatory    Karnofsky Performance Score:  80%- Normal Activity with Effort: Some Symptoms of Disease    Labs:   Lab Results   Component Value Date    WBC 1.03 (LL) 01/16/2025    HGB 10.1 (L) 01/16/2025    HCT 30.7 (L) 01/16/2025     (H) 01/16/2025    PLT 13 (LL) 01/16/2025        CMP  Sodium   Date Value Ref Range Status   01/16/2025 140 136 - 145 mmol/L Final     Potassium   Date Value Ref Range Status   01/16/2025 4.4 3.5 - 5.1  mmol/L Final     Chloride   Date Value Ref Range Status   01/16/2025 105 95 - 110 mmol/L Final     CO2   Date Value Ref Range Status   01/16/2025 28 23 - 29 mmol/L Final     Glucose   Date Value Ref Range Status   01/16/2025 155 (H) 70 - 110 mg/dL Final     BUN   Date Value Ref Range Status   01/16/2025 13 8 - 23 mg/dL Final     Creatinine   Date Value Ref Range Status   01/16/2025 0.9 0.5 - 1.4 mg/dL Final     Calcium   Date Value Ref Range Status   01/16/2025 8.7 8.7 - 10.5 mg/dL Final     Total Protein   Date Value Ref Range Status   01/16/2025 5.0 (L) 6.0 - 8.4 g/dL Final     Albumin   Date Value Ref Range Status   01/16/2025 2.8 (L) 3.5 - 5.2 g/dL Final     Total Bilirubin   Date Value Ref Range Status   01/16/2025 0.8 0.1 - 1.0 mg/dL Final     Comment:     For infants and newborns, interpretation of results should be based  on gestational age, weight and in agreement with clinical  observations.    Premature Infant recommended reference ranges:  Up to 24 hours.............<8.0 mg/dL  Up to 48 hours............<12.0 mg/dL  3-5 days..................<15.0 mg/dL  6-29 days.................<15.0 mg/dL       Alkaline Phosphatase   Date Value Ref Range Status   01/16/2025 130 40 - 150 U/L Final     AST   Date Value Ref Range Status   01/16/2025 42 (H) 10 - 40 U/L Final     ALT   Date Value Ref Range Status   01/16/2025 87 (H) 10 - 44 U/L Final     Anion Gap   Date Value Ref Range Status   01/16/2025 7 (L) 8 - 16 mmol/L Final     eGFR   Date Value Ref Range Status   01/16/2025 >60.0 >60 mL/min/1.73 m^2 Final          Imaging:   Hospital imaging reviewed.    Pathology:  Prior pathology reviewed.   Acute GVHD Scoring:  GVHD Acute Assessment      No GVHD at this time.               Assessment and Plan:   Guillaume Betsy Salinas (Guillaume) is a pleasant 69 y.o.male with a past medical history of MDS s/p haplo SCT who presents for post-transplant follow up.    MDS: Status post treatment with azacitidine 75 mg/m2 daily x7  days plus venetoclax 100mg (voriconazole) daily x14 of 28 days.Venetoclax decreased to 7 days with cycle 3 until completion of 11 cycles. No plans for maintenance at this time.     Status post allogeneic stem cell transplantation: As noted above, status post haploidentical stem cell transplantation conditioned with FluMel 100 + PTCy+ 2Gy TBI . Currently Day+ 127. Engrafted on 10/09/24 day +20.  Day 30 bone marrow (11/5/2024) showing mildly hypercellular marrow with no increased blast (0.3%) and no evidence of myeloid neoplasm. Pending chimerisms, NGS, and CG  Day 100 bone marrow biopsy on 12/31/24 showed 30-40% cellularity, erythroid hyperplasia, dyserythropoiesis, decreased megakaryocytes with atypical morphology. No hemophagocytosis mentioned. NGS, FISH, and CG normal. 100% chimerisms.     3.    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 He started budesonide 3mg TID on 10/28/24. Due to persistent nausea despite budesonide so started systemic steroids 11/1 at 0.5 mg/kg and his steroid taper has been given to him. Steroid taper completed 12/8/24. No evidence of aGVHD today. 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 Taper listed below.  Current tacro dose: 2 capsule (1 mg) in the morning and 1 capsules (0.5 mg) in the evening.   Last tacro level: 10.2  Adjustments: none  1/7 - 1/10: Prednisone 4 tablets (40mg) once a day   1/11 - 1/14: Prednisone 2 tablets (20mg) once a day   1/15 - 1/18: Prednisone 1 tablet (10mg) once a day  1/19 - 1/23: Prednisone 0.5 tablet (0.5mg) once a day  1/24 - 1/27: Prednisone 0.5 tablet (0.5mg) every other day  1/28: STOP     4.     Immunosuppression: Prevention with posaconazole, acyclovir. CMV prophylaxis with letermovir. PJP prophyalxis  atovaquone. Continue weekly monitoring of CMV and EBV.  Last CMV: Not-detected,   last EBV: pending today, 60 on 1/16/24  Active infections: N/A    Pancytopenia: Due to underlying disease and chemotherapy. Transfuse for Hgb <7 g/dL and platelets <10k. Folate and copper deficient, on supplementation. Will continue to monitor to see if platelets begin to rise with his supplements. Promacta sent in on 12/9/24. Working on aditya for financial assistance for promacta. Change TMP/SMX to atovaquone.   Folic Acid deficiency: Continue folic acid 1mg daily  Copper deficiency: Copper level of 635, continue copper gluconate 2mg daily     Post transplant lymphoproliferative disorder: Patient received rituxan on 12/30/24 due to possible PTLD with elevated EBV and possible HLH. Now EBV has improved.     Hypomagnesemia: Related to tacro, poor po intake. Continue MagOx to 800mg twice daily.      Chemotherapy Induced Nausea: Resolved with steroids. Tapered budesonide. Patient has his taper in [place].   Taper ended 12/8/24  New Taper from hospitalization listed above     Anxiety, Restless Leg Syndrome: Noted since discharge by family. He started ropinirole 0.5mg and has experienced significant improvement.    Insomnia: Patient with significant improvement. Still wakes up a couple of times during the night but feels well rested in the mornings. Continue Ambien and Ropinirole for RLS.     Urinary frequency and hesitancy: Patient with frequency and hesitancy for a few days now. No hematuria, dysuria, or urgency. Urinalysis completed today is negative for UTI.  Culture pending.     Follow up: Every other week with weekly labs    Orders Placed:             Medical Complexity:   Visit today included increased complexity associated with the care of the episodic problems addressed above and managing the longitudinal care of the patient due to the serious and/or complex managed problem(s) history of allo SCT.      Follow Up: Every other week  with weekly labs       BMT Chart Routing      Follow up with physician    Follow up with [facility] 2 weeks. Gilma independent   Provider visit type    Infusion scheduling note    Injection scheduling note    Labs CBC, CMP, CMV, EBV, magnesium, phosphorus, type and screen and tacrolimus level   Scheduling:  Preferred lab:  Lab interval:  weekly. Labs in jarad the weeks he does not have an appointment   Imaging    Pharmacy appointment    Other referrals               A total of 35 minutes was spent in pre-visit chart review, personal interpretation of labs and imaging, and medication review. Total visit time 35 minutes, >50 % counseling.       Gilma Ugalde PA-C  Malignant Hematology, Stem Cell Transplant, and Cellular Therapy  [facility]  [facility]

## 2025-01-24 ENCOUNTER — LAB VISIT (OUTPATIENT)
Dept: LAB | Facility: HOSPITAL | Age: 70
End: 2025-01-24
Attending: INTERNAL MEDICINE
Payer: MEDICARE

## 2025-01-24 ENCOUNTER — TELEPHONE (OUTPATIENT)
Dept: HEMATOLOGY/ONCOLOGY | Facility: CLINIC | Age: 70
End: 2025-01-24

## 2025-01-24 ENCOUNTER — CLINICAL SUPPORT (OUTPATIENT)
Dept: REHABILITATION | Facility: HOSPITAL | Age: 70
End: 2025-01-24
Payer: MEDICARE

## 2025-01-24 ENCOUNTER — OFFICE VISIT (OUTPATIENT)
Dept: HEMATOLOGY/ONCOLOGY | Facility: CLINIC | Age: 70
End: 2025-01-24
Payer: MEDICARE

## 2025-01-24 ENCOUNTER — DOCUMENTATION ONLY (OUTPATIENT)
Dept: HEMATOLOGY/ONCOLOGY | Facility: CLINIC | Age: 70
End: 2025-01-24

## 2025-01-24 VITALS
RESPIRATION RATE: 16 BRPM | SYSTOLIC BLOOD PRESSURE: 111 MMHG | WEIGHT: 135.5 LBS | HEART RATE: 94 BPM | BODY MASS INDEX: 20.07 KG/M2 | OXYGEN SATURATION: 99 % | HEIGHT: 69 IN | DIASTOLIC BLOOD PRESSURE: 74 MMHG | TEMPERATURE: 98 F

## 2025-01-24 DIAGNOSIS — D46.9 MYELODYSPLASTIC SYNDROME: ICD-10-CM

## 2025-01-24 DIAGNOSIS — Z94.81 S/P ALLOGENEIC BONE MARROW TRANSPLANT: ICD-10-CM

## 2025-01-24 DIAGNOSIS — R35.0 URINARY FREQUENCY: ICD-10-CM

## 2025-01-24 DIAGNOSIS — Z74.09 DECREASED STRENGTH, ENDURANCE, AND MOBILITY: Primary | ICD-10-CM

## 2025-01-24 DIAGNOSIS — E83.42 HYPOMAGNESEMIA: ICD-10-CM

## 2025-01-24 DIAGNOSIS — D61.818 PANCYTOPENIA: ICD-10-CM

## 2025-01-24 DIAGNOSIS — D46.9 MYELODYSPLASTIC SYNDROME: Primary | ICD-10-CM

## 2025-01-24 DIAGNOSIS — D46.22 MYELODYSPLASTIC SYNDROME WITH EXCESS BLASTS-2: ICD-10-CM

## 2025-01-24 DIAGNOSIS — G47.00 INSOMNIA, UNSPECIFIED TYPE: ICD-10-CM

## 2025-01-24 DIAGNOSIS — R53.1 DECREASED STRENGTH, ENDURANCE, AND MOBILITY: Primary | ICD-10-CM

## 2025-01-24 DIAGNOSIS — D53.9 NUTRITIONAL ANEMIA, UNSPECIFIED: ICD-10-CM

## 2025-01-24 DIAGNOSIS — F41.9 ANXIETY: ICD-10-CM

## 2025-01-24 DIAGNOSIS — R68.89 DECREASED STRENGTH, ENDURANCE, AND MOBILITY: Primary | ICD-10-CM

## 2025-01-24 DIAGNOSIS — Z76.82 STEM CELL TRANSPLANT CANDIDATE: ICD-10-CM

## 2025-01-24 DIAGNOSIS — D84.81 IMMUNODEFICIENCY DUE TO CONDITIONS CLASSIFIED ELSEWHERE: ICD-10-CM

## 2025-01-24 LAB
ABO + RH BLD: NORMAL
ALBUMIN SERPL BCP-MCNC: 3.5 G/DL (ref 3.5–5.2)
ALP SERPL-CCNC: 97 U/L (ref 40–150)
ALT SERPL W/O P-5'-P-CCNC: 49 U/L (ref 10–44)
ANION GAP SERPL CALC-SCNC: 8 MMOL/L (ref 8–16)
AST SERPL-CCNC: 33 U/L (ref 10–40)
BASOPHILS # BLD AUTO: 0.01 K/UL (ref 0–0.2)
BASOPHILS NFR BLD: 0.5 % (ref 0–1.9)
BILIRUB SERPL-MCNC: 0.6 MG/DL (ref 0.1–1)
BILIRUB UR QL STRIP: NEGATIVE
BLD GP AB SCN CELLS X3 SERPL QL: NORMAL
BUN SERPL-MCNC: 20 MG/DL (ref 8–23)
CALCIUM SERPL-MCNC: 9.4 MG/DL (ref 8.7–10.5)
CHLORIDE SERPL-SCNC: 104 MMOL/L (ref 95–110)
CLARITY UR REFRACT.AUTO: CLEAR
CO2 SERPL-SCNC: 27 MMOL/L (ref 23–29)
COLOR UR AUTO: YELLOW
CREAT SERPL-MCNC: 1 MG/DL (ref 0.5–1.4)
DIFFERENTIAL METHOD BLD: ABNORMAL
EOSINOPHIL # BLD AUTO: 0 K/UL (ref 0–0.5)
EOSINOPHIL NFR BLD: 0.9 % (ref 0–8)
ERYTHROCYTE [DISTWIDTH] IN BLOOD BY AUTOMATED COUNT: 19.3 % (ref 11.5–14.5)
EST. GFR  (NO RACE VARIABLE): >60 ML/MIN/1.73 M^2
FOLATE SERPL-MCNC: 12.9 NG/ML (ref 4–24)
GLUCOSE SERPL-MCNC: 127 MG/DL (ref 70–110)
GLUCOSE UR QL STRIP: NEGATIVE
HAPTOGLOB SERPL-MCNC: <10 MG/DL (ref 30–250)
HCT VFR BLD AUTO: 32.7 % (ref 40–54)
HGB BLD-MCNC: 10.6 G/DL (ref 14–18)
HGB UR QL STRIP: NEGATIVE
IMM GRANULOCYTES # BLD AUTO: 0.01 K/UL (ref 0–0.04)
IMM GRANULOCYTES NFR BLD AUTO: 0.5 % (ref 0–0.5)
IRON SERPL-MCNC: 265 UG/DL (ref 45–160)
KETONES UR QL STRIP: NEGATIVE
LEUKOCYTE ESTERASE UR QL STRIP: NEGATIVE
LYMPHOCYTES # BLD AUTO: 1.4 K/UL (ref 1–4.8)
LYMPHOCYTES NFR BLD: 63.4 % (ref 18–48)
MAGNESIUM SERPL-MCNC: 1.5 MG/DL (ref 1.6–2.6)
MCH RBC QN AUTO: 37.1 PG (ref 27–31)
MCHC RBC AUTO-ENTMCNC: 32.4 G/DL (ref 32–36)
MCV RBC AUTO: 114 FL (ref 82–98)
MONOCYTES # BLD AUTO: 0.3 K/UL (ref 0.3–1)
MONOCYTES NFR BLD: 12.5 % (ref 4–15)
NEUTROPHILS # BLD AUTO: 0.5 K/UL (ref 1.8–7.7)
NEUTROPHILS NFR BLD: 22.2 % (ref 38–73)
NITRITE UR QL STRIP: NEGATIVE
NRBC BLD-RTO: 0 /100 WBC
PH UR STRIP: 6 [PH] (ref 5–8)
PHOSPHATE SERPL-MCNC: 2.4 MG/DL (ref 2.7–4.5)
PLATELET # BLD AUTO: 19 K/UL (ref 150–450)
PLATELET BLD QL SMEAR: ABNORMAL
PMV BLD AUTO: ABNORMAL FL (ref 9.2–12.9)
POIKILOCYTOSIS BLD QL SMEAR: SLIGHT
POTASSIUM SERPL-SCNC: 4.7 MMOL/L (ref 3.5–5.1)
PROT SERPL-MCNC: 6.3 G/DL (ref 6–8.4)
PROT UR QL STRIP: ABNORMAL
RBC # BLD AUTO: 2.86 M/UL (ref 4.6–6.2)
RETICS/RBC NFR AUTO: 3.2 % (ref 0.4–2)
SATURATED IRON: 108 % (ref 20–50)
SODIUM SERPL-SCNC: 139 MMOL/L (ref 136–145)
SP GR UR STRIP: 1.02 (ref 1–1.03)
SPECIMEN OUTDATE: NORMAL
TACROLIMUS BLD-MCNC: 10.2 NG/ML (ref 5–15)
TESTOST SERPL-MCNC: 964 NG/DL (ref 304–1227)
TOTAL IRON BINDING CAPACITY: 246 UG/DL (ref 250–450)
TRANSFERRIN SERPL-MCNC: 166 MG/DL (ref 200–375)
URN SPEC COLLECT METH UR: ABNORMAL
VIT B12 SERPL-MCNC: 368 PG/ML (ref 210–950)
WBC # BLD AUTO: 2.16 K/UL (ref 3.9–12.7)

## 2025-01-24 PROCEDURE — 87086 URINE CULTURE/COLONY COUNT: CPT

## 2025-01-24 PROCEDURE — 84100 ASSAY OF PHOSPHORUS: CPT | Performed by: INTERNAL MEDICINE

## 2025-01-24 PROCEDURE — 99999 PR PBB SHADOW E&M-EST. PATIENT-LVL IV: CPT | Mod: PBBFAC,,,

## 2025-01-24 PROCEDURE — 3074F SYST BP LT 130 MM HG: CPT | Mod: CPTII,S$GLB,,

## 2025-01-24 PROCEDURE — 81003 URINALYSIS AUTO W/O SCOPE: CPT

## 2025-01-24 PROCEDURE — 1159F MED LIST DOCD IN RCRD: CPT | Mod: CPTII,S$GLB,,

## 2025-01-24 PROCEDURE — 83921 ORGANIC ACID SINGLE QUANT: CPT | Performed by: INTERNAL MEDICINE

## 2025-01-24 PROCEDURE — 80053 COMPREHEN METABOLIC PANEL: CPT | Performed by: INTERNAL MEDICINE

## 2025-01-24 PROCEDURE — 97112 NEUROMUSCULAR REEDUCATION: CPT | Mod: PN

## 2025-01-24 PROCEDURE — 87799 DETECT AGENT NOS DNA QUANT: CPT | Performed by: INTERNAL MEDICINE

## 2025-01-24 PROCEDURE — 97530 THERAPEUTIC ACTIVITIES: CPT | Mod: PN

## 2025-01-24 PROCEDURE — 82525 ASSAY OF COPPER: CPT | Performed by: INTERNAL MEDICINE

## 2025-01-24 PROCEDURE — 84403 ASSAY OF TOTAL TESTOSTERONE: CPT | Performed by: INTERNAL MEDICINE

## 2025-01-24 PROCEDURE — 1126F AMNT PAIN NOTED NONE PRSNT: CPT | Mod: CPTII,S$GLB,,

## 2025-01-24 PROCEDURE — 83010 ASSAY OF HAPTOGLOBIN QUANT: CPT | Performed by: INTERNAL MEDICINE

## 2025-01-24 PROCEDURE — 82607 VITAMIN B-12: CPT | Performed by: INTERNAL MEDICINE

## 2025-01-24 PROCEDURE — 87532 HHV-6 DNA AMP PROBE: CPT | Performed by: INTERNAL MEDICINE

## 2025-01-24 PROCEDURE — 87799 DETECT AGENT NOS DNA QUANT: CPT | Mod: 91 | Performed by: INTERNAL MEDICINE

## 2025-01-24 PROCEDURE — 3078F DIAST BP <80 MM HG: CPT | Mod: CPTII,S$GLB,,

## 2025-01-24 PROCEDURE — 86900 BLOOD TYPING SEROLOGIC ABO: CPT | Performed by: INTERNAL MEDICINE

## 2025-01-24 PROCEDURE — 83735 ASSAY OF MAGNESIUM: CPT | Performed by: INTERNAL MEDICINE

## 2025-01-24 PROCEDURE — 3008F BODY MASS INDEX DOCD: CPT | Mod: CPTII,S$GLB,,

## 2025-01-24 PROCEDURE — 85025 COMPLETE CBC W/AUTO DIFF WBC: CPT | Performed by: INTERNAL MEDICINE

## 2025-01-24 PROCEDURE — 85045 AUTOMATED RETICULOCYTE COUNT: CPT | Performed by: INTERNAL MEDICINE

## 2025-01-24 PROCEDURE — 83520 IMMUNOASSAY QUANT NOS NONAB: CPT | Performed by: INTERNAL MEDICINE

## 2025-01-24 PROCEDURE — 1101F PT FALLS ASSESS-DOCD LE1/YR: CPT | Mod: CPTII,S$GLB,,

## 2025-01-24 PROCEDURE — 1160F RVW MEDS BY RX/DR IN RCRD: CPT | Mod: CPTII,S$GLB,,

## 2025-01-24 PROCEDURE — 1111F DSCHRG MED/CURRENT MED MERGE: CPT | Mod: CPTII,S$GLB,,

## 2025-01-24 PROCEDURE — 80197 ASSAY OF TACROLIMUS: CPT | Performed by: INTERNAL MEDICINE

## 2025-01-24 PROCEDURE — 99215 OFFICE O/P EST HI 40 MIN: CPT | Mod: S$GLB,,,

## 2025-01-24 PROCEDURE — 84466 ASSAY OF TRANSFERRIN: CPT | Performed by: INTERNAL MEDICINE

## 2025-01-24 PROCEDURE — 81340 TRB@ GENE REARRANGE AMPLIFY: CPT | Performed by: INTERNAL MEDICINE

## 2025-01-24 PROCEDURE — 87533 HHV-6 DNA QUANT: CPT | Performed by: INTERNAL MEDICINE

## 2025-01-24 PROCEDURE — 3288F FALL RISK ASSESSMENT DOCD: CPT | Mod: CPTII,S$GLB,,

## 2025-01-24 PROCEDURE — 82746 ASSAY OF FOLIC ACID SERUM: CPT | Performed by: INTERNAL MEDICINE

## 2025-01-24 NOTE — PROGRESS NOTES
"[facility]   Physical Therapy Treatment Note      Name: Guillaume Amezcuaona  Clinic Number: [id number]    Therapy Diagnosis:   Encounter Diagnosis   Name Primary?    Decreased strength, endurance, and mobility Yes       Physician: Mohit Hickey MD    Visit Date: 1/24/2025    Physician Orders: PT Eval and Treat   Medical Diagnosis from Referral: Z94.81 (ICD-10-CM) - S/P allogeneic bone marrow transplant   Evaluation Date: 12/20/2024  Authorization Period Expiration: 12/09/2025   Plan of Care Expiration: 3/20/25  Progress Note Due: 1/20/25  Date of Surgery: 9/19/14  Visit # / Visits authorized: 3/ 10 +eval  FOTO: 1/ 3     Precautions: Standard and HTN,Myelodysplastic syndrome, PVD, CAD, chemotherapy        Time In: 1:09p  Time Out: 1:58p  Total Billable Time: 30 minutes    PTA Visit #: 0/5       Subjective     Patient reports: He had a lot of blood drawn today and just came from the doctor. He is feeling a little fatigued but he still wanted to come to therapy.   He was not compliant with home exercise program.  Response to previous treatment: tired  Functional change: recent hospitalization     Pain: 0/10  Location: bilateral legs     Objective      Objective Measures updated at progress report unless specified.     Treatment     Guillaume received the treatments listed below:      neuromuscular re-education activities to improve: Balance, Coordination, Proprioception, and Posture for 19 minutes. The following activities were included:  Seated hip adduction with ball 5" 2x10  Seated clams Red theraband 2x10   Supine Straight leg raise 2x10 ea  Bridges 2x10    therapeutic activities to improve functional performance for 30  minutes, including:  Nustep x 8 minutes  Seated march x3 minutes 3lb  Seated long arc quad 3lb 2 minutes ea  Sit to stand from raised mat x10  Standing hip abduction 2x10 ea  2 minute walk around clinic (2 laps) NT  Patient/family education       Patient " Education and Home Exercises       Education provided:   - Home exercise program as tolerated    Written Home Exercises Provided: Pt instructed to continue prior HEP. Exercises were reviewed and Guillaume was able to demonstrate them prior to the end of the session.  Guillaume demonstrated good  understanding of the education provided. See Electronic Medical Record under Patient Instructions for exercises provided during therapy sessions    Assessment     Mr. Galaviz arrived to therapy with increase in fatigue today due to having blood drawn. PT allowed patient to take multiple rest breaks throughout session to improve tolerance to exercises. No progressions were made to today's session. He was fatigued at end of session.     Guillaume Is progressing well towards his goals.   Patient prognosis is Fair.     Patient will continue to benefit from skilled outpatient physical therapy to address the deficits listed in the problem list box on initial evaluation, provide pt/family education and to maximize pt's level of independence in the home and community environment.     Patient's spiritual, cultural and educational needs considered and pt agreeable to plan of care and goals.     Anticipated barriers to physical therapy: co-morbidities    Goals: Short Term Goals: 6 weeks   Patient will be independent with Home exercise program to supplement therapy progressing, not met   Patient will be able to ambulate 1300 ft with 6 Minute walk test to improve endurance for community mobility progressing, not met  Patient will be able to lift and carry groceries without difficulty to improve ability to perform functional tasks progressing, not met     Long Term Goals: 12 weeks   Patient will be able to ambulate 1400 ft or more with 6 Minute walk test to improve endurance for community mobility progressing, not met  Patient will be able to perform 16 sit to stand on 30 second sit to  order to improve functional strength and endurance for  transfers progressing, not met  Patient will improve FOTO score to 65 % to improve self perceived ability to perform functional tasks progressing, not met  Patient goal: Patient will be able to mow his lawn to return to prior level of function progressing, not met    Plan     Improve functional strength and endurance    Elvira Joyce, PT ,DPT,OCS  01/24/2025

## 2025-01-25 ENCOUNTER — PATIENT MESSAGE (OUTPATIENT)
Dept: HEMATOLOGY/ONCOLOGY | Facility: CLINIC | Age: 70
End: 2025-01-25
Payer: MEDICARE

## 2025-01-26 LAB — BACTERIA UR CULT: NORMAL

## 2025-01-27 DIAGNOSIS — Z94.84 IMMUNOCOMPROMISED STATE ASSOCIATED WITH STEM CELL TRANSPLANT: ICD-10-CM

## 2025-01-27 DIAGNOSIS — D84.822 IMMUNOCOMPROMISED STATE ASSOCIATED WITH STEM CELL TRANSPLANT: ICD-10-CM

## 2025-01-27 DIAGNOSIS — K20.90 ESOPHAGITIS: ICD-10-CM

## 2025-01-27 LAB
B19V DNA # SPEC NAA+PROBE: NOT DETECTED COPIES/ML
CMV DNA SPEC QL NAA+PROBE: NORMAL
CYTOMEGALOVIRUS PCR, QUANT: NOT DETECTED IU/ML
EPSTEIN-BARR VIRUS DNA: ABNORMAL
EPSTEIN-BARR VIRUS LOG (IU/ML): 1.9 LOGIU/ML
EPSTEIN-BARR VIRUS PCR, QUANT: 79 IU/ML
PARVOVIRUS B19 DNA, QUANT: NOT DETECTED LOG CPS/ML
SOL IL2 RECEP SERPL-MCNC: 923.2 PG/ML (ref 175.3–858.2)
SPECIMEN SOURCE: NORMAL

## 2025-01-27 RX ORDER — POSACONAZOLE 100 MG/1
300 TABLET, DELAYED RELEASE ORAL DAILY
Qty: 90 TABLET | Refills: 11 | Status: SHIPPED | OUTPATIENT
Start: 2025-01-27

## 2025-01-27 RX ORDER — PANTOPRAZOLE SODIUM 40 MG/1
40 TABLET, DELAYED RELEASE ORAL DAILY
Qty: 30 TABLET | Refills: 3 | Status: SHIPPED | OUTPATIENT
Start: 2025-01-27 | End: 2025-05-27

## 2025-01-28 ENCOUNTER — CLINICAL SUPPORT (OUTPATIENT)
Dept: REHABILITATION | Facility: HOSPITAL | Age: 70
End: 2025-01-28
Payer: MEDICARE

## 2025-01-28 ENCOUNTER — RESEARCH ENCOUNTER (OUTPATIENT)
Dept: RESEARCH | Facility: HOSPITAL | Age: 70
End: 2025-01-28
Payer: MEDICARE

## 2025-01-28 ENCOUNTER — PATIENT MESSAGE (OUTPATIENT)
Dept: RESEARCH | Facility: HOSPITAL | Age: 70
End: 2025-01-28
Payer: MEDICARE

## 2025-01-28 DIAGNOSIS — R53.1 DECREASED STRENGTH, ENDURANCE, AND MOBILITY: Primary | ICD-10-CM

## 2025-01-28 DIAGNOSIS — D46.9 MYELODYSPLASTIC SYNDROME: ICD-10-CM

## 2025-01-28 DIAGNOSIS — Z74.09 DECREASED STRENGTH, ENDURANCE, AND MOBILITY: Primary | ICD-10-CM

## 2025-01-28 DIAGNOSIS — Z00.6 RESEARCH STUDY PATIENT: Primary | ICD-10-CM

## 2025-01-28 DIAGNOSIS — R68.89 DECREASED STRENGTH, ENDURANCE, AND MOBILITY: Primary | ICD-10-CM

## 2025-01-28 LAB
COPPER SERPL-MCNC: 1054 UG/L (ref 665–1480)
HADV DNA # SPEC NAA+PROBE: <1000 CPY/ML
HADV DNA SPEC NAA+PROBE-LOG#: <3 LOG CPY/ML
HADV DNA SPEC QL NAA+PROBE: NOT DETECTED
MAYO MISCELLANEOUS RESULT (REF): NORMAL
METHYLMALONATE SERPL-SCNC: 1.51 UMOL/L
PATHOLOGIST NAME: NORMAL
SPECIMEN SOURCE: NORMAL
T CELL RECEPTOR GENE REARRANGEMENT, BLOOD: NORMAL

## 2025-01-28 PROCEDURE — 97530 THERAPEUTIC ACTIVITIES: CPT | Mod: PN,CQ

## 2025-01-28 NOTE — PROGRESS NOTES
"[facility]   Physical Therapy Treatment Note      Name: Guillaume Salinas  Clinic Number: [id number]    Therapy Diagnosis:   Encounter Diagnosis   Name Primary?    Decreased strength, endurance, and mobility Yes     Physician: Mohit Hickey MD    Visit Date: 1/28/2025    Physician Orders: PT Eval and Treat   Medical Diagnosis from Referral: Z94.81 (ICD-10-CM) - S/P allogeneic bone marrow transplant   Evaluation Date: 12/20/2024  Authorization Period Expiration: 12/09/2025   Plan of Care Expiration: 3/20/25  Progress Note Due: 1/20/25  Date of Surgery: 9/19/14  Visit # / Visits authorized: 4/ 10 +eval  FOTO: 1/ 3     Precautions: Standard and HTN,Myelodysplastic syndrome, PVD, CAD, chemotherapy        Time In: 1:05 PM   Time Out: 2:00 PM  Total Billable Time: 30 minutes    PTA Visit #: 1/5     Subjective     Patient reports: "I haven't been able to eat much, I've been getting dizzy". Pt's wife reports he took his prescribed medication including his BP meds, has not eaten much today. She reports she measured his BP at home and was registered "normal", but was taken in supine position. Pt / pt wife agreeable to position on table to monitor BP closely and if a need to contact MD or visit ED are strongly considered. Pt agreeable to PT session.   He was not compliant with home exercise program.  Response to previous treatment: tired, dizziness  Functional change: recent hospitalization     Pain: 0/10  Location: bilateral legs     Objective    1/28/25     TIME  BP  O2 sats Heart Rate  (room air)   1305  78/83 mm/Hg 99 %  90 bpm seated  1320  73/52 mm/Hg 98 %  76 bpm seated  1335  93/70 mm/Hg 98%  97 bpm supine  1350  91/68 mm/Hg 97%  79 bpm supine  1400  99/73 mm/Hg 89%  77 bpm seated     Treatment     Guillaume received the treatments listed below:      neuromuscular re-education activities to improve: Balance, Coordination, Proprioception, and Posture for 00 minutes. The following " "activities were included:  Seated hip adduction with ball 5" 2x10  Seated clams Red theraband 2x10   Supine Straight leg raise 2x10 ea  Bridges    therapeutic activities to improve functional performance for 60  minutes, including:  Closely monitoring of vitals every 15 min for 1 hour. Pt's wife present for positional Vital monitoring.   Heavy education of importance of hydration and contacting MD to notify of hypotensive episodes. Pt was educated on daily BP monitoring especially if pt displays dizziness / light headedness.    SEE OBJECTIVE MEASUREMENTS FOR VITAL ASSESSMENT    NOT PERFORMED TODAY:  Nustep x 8 minutes  Seated march x3 minutes 3lb  Seated long arc quad 3lb 2 minutes ea  Sit to stand from raised mat x10  Standing hip abduction 2x10 ea  2 minute walk around clinic (2 laps) NT    Patient Education and Home Exercises       Education provided:   - Home exercise program as tolerated    Written Home Exercises Provided: Pt instructed to continue prior HEP. Exercises were reviewed and Guillaume was able to demonstrate them prior to the end of the session.  Guillaume demonstrated good  understanding of the education provided. See Electronic Medical Record under Patient Instructions for exercises provided during therapy sessions    Assessment     Mr. Galaviz arrived to session with reports of feeling dizziness/ lightheadedness with sit to stand from waiting room chair. Pt wheeled in treatment room via wheelchair and positioned on treatment table.  Session consisted on vital assessment every 15 min throughout treatment while pt's wife present to confirm treatment plan today. Pt presents with symptoms of orthostatic hypotension, closely monitored throughout treatment. Pt / wife declined ambulance but confirmed they are prepared to visit ED if necessary. Pt encouraged to contact MD about today's episode.   Guillaume Is progressing well towards his goals.   Patient prognosis is Fair.     Patient will continue to benefit from " skilled outpatient physical therapy to address the deficits listed in the problem list box on initial evaluation, provide pt/family education and to maximize pt's level of independence in the home and community environment.     Patient's spiritual, cultural and educational needs considered and pt agreeable to plan of care and goals.     Anticipated barriers to physical therapy: co-morbidities    Goals: Short Term Goals: 6 weeks   Patient will be independent with Home exercise program to supplement therapy progressing, not met   Patient will be able to ambulate 1300 ft with 6 Minute walk test to improve endurance for community mobility progressing, not met  Patient will be able to lift and carry groceries without difficulty to improve ability to perform functional tasks progressing, not met     Long Term Goals: 12 weeks   Patient will be able to ambulate 1400 ft or more with 6 Minute walk test to improve endurance for community mobility progressing, not met  Patient will be able to perform 16 sit to stand on 30 second sit to  order to improve functional strength and endurance for transfers progressing, not met  Patient will improve FOTO score to 65 % to improve self perceived ability to perform functional tasks progressing, not met  Patient goal: Patient will be able to mow his lawn to return to prior level of function progressing, not met    Plan     Improve functional strength and endurance    Julian Morrow, PTA   01/28/2025

## 2025-01-28 NOTE — PROGRESS NOTES
Mr. Salinas was called today regarding his participation in (IRB #2015.101 PI: Judith). I was assisted by [place] , Rasta Michelle, #[id number].    The Verbal Informed Consent was read and discussed by the consenter. The following was discussed:  Types of specimens to be collected  All medical information released to researchers will be stripped of identifiers and no patient information will be given to anyone outside of this research project.   Participating in a research study is not the same as getting regular medical care and will not improve the patient's health. The purpose of a research study is to gather information.  Being in this study does not interfere with your regular medical care.  The patient/LAR does not have to participate in this study. If they do not join, their care at [facility] will not be affected.    The person granting permission was provided adequate time to ask questions regarding the scope and purpose of the study.    Permission was obtained by telephone.     The above statements were read by the person obtaining permission to the person granting permission and witnessed by  Rasta Michelle and the patient's spouse. The witness information was documented on the verbal consent form as well.    This Verbal Informed Consent process was conducted prior to initiation of any study procedures.

## 2025-01-29 ENCOUNTER — PATIENT MESSAGE (OUTPATIENT)
Dept: HEMATOLOGY/ONCOLOGY | Facility: CLINIC | Age: 70
End: 2025-01-29
Payer: MEDICARE

## 2025-01-30 ENCOUNTER — DOCUMENTATION ONLY (OUTPATIENT)
Dept: REHABILITATION | Facility: HOSPITAL | Age: 70
End: 2025-01-30
Payer: MEDICARE

## 2025-01-30 NOTE — PROGRESS NOTES
Face to face meeting completed with Elvira Joyce DPT regarding current status and progress of   Guillaume Salinas .         Julian Morrow, PTA

## 2025-01-31 ENCOUNTER — RESEARCH ENCOUNTER (OUTPATIENT)
Dept: RESEARCH | Facility: HOSPITAL | Age: 70
End: 2025-01-31
Payer: MEDICARE

## 2025-01-31 NOTE — PROGRESS NOTES
IRB Protocol # 2015.101  SHAHRAM - Judith      Blood and swab samples from inner cheeks were retrieved and brought to the [facility] for processing as per specifics of project.

## 2025-02-01 DIAGNOSIS — D84.822 IMMUNODEFICIENCY DUE TO EXTERNAL CAUSE: ICD-10-CM

## 2025-02-02 DIAGNOSIS — G47.00 INSOMNIA, UNSPECIFIED TYPE: ICD-10-CM

## 2025-02-02 RX ORDER — ATOVAQUONE 750 MG/5ML
1500 SUSPENSION ORAL DAILY
Qty: 210 ML | Refills: 2 | Status: SHIPPED | OUTPATIENT
Start: 2025-02-02 | End: 2025-02-23 | Stop reason: SDUPTHER

## 2025-02-03 RX ORDER — ZOLPIDEM TARTRATE 5 MG/1
5 TABLET ORAL NIGHTLY PRN
Qty: 30 TABLET | Refills: 0 | Status: SHIPPED | OUTPATIENT
Start: 2025-02-03

## 2025-02-05 ENCOUNTER — LAB VISIT (OUTPATIENT)
Dept: LAB | Facility: HOSPITAL | Age: 70
End: 2025-02-05
Attending: INTERNAL MEDICINE
Payer: MEDICARE

## 2025-02-05 DIAGNOSIS — Z94.81 S/P ALLOGENEIC BONE MARROW TRANSPLANT: ICD-10-CM

## 2025-02-05 DIAGNOSIS — R11.0 NAUSEA: Primary | ICD-10-CM

## 2025-02-05 DIAGNOSIS — Z76.82 STEM CELL TRANSPLANT CANDIDATE: ICD-10-CM

## 2025-02-05 DIAGNOSIS — D46.9 MYELODYSPLASTIC SYNDROME: ICD-10-CM

## 2025-02-05 LAB
ABO + RH BLD: NORMAL
ALBUMIN SERPL BCP-MCNC: 3.5 G/DL (ref 3.5–5.2)
ALP SERPL-CCNC: 83 U/L (ref 40–150)
ALT SERPL W/O P-5'-P-CCNC: 39 U/L (ref 10–44)
ANION GAP SERPL CALC-SCNC: 9 MMOL/L (ref 8–16)
ANISOCYTOSIS BLD QL SMEAR: SLIGHT
AST SERPL-CCNC: 36 U/L (ref 10–40)
BASOPHILS NFR BLD: 0 % (ref 0–1.9)
BILIRUB SERPL-MCNC: 0.5 MG/DL (ref 0.1–1)
BLD GP AB SCN CELLS X3 SERPL QL: NORMAL
BUN SERPL-MCNC: 13 MG/DL (ref 8–23)
CALCIUM SERPL-MCNC: 9.1 MG/DL (ref 8.7–10.5)
CHLORIDE SERPL-SCNC: 104 MMOL/L (ref 95–110)
CO2 SERPL-SCNC: 25 MMOL/L (ref 23–29)
CREAT SERPL-MCNC: 1.3 MG/DL (ref 0.5–1.4)
DACRYOCYTES BLD QL SMEAR: ABNORMAL
DIFFERENTIAL METHOD BLD: ABNORMAL
EOSINOPHIL NFR BLD: 0 % (ref 0–8)
ERYTHROCYTE [DISTWIDTH] IN BLOOD BY AUTOMATED COUNT: 17.2 % (ref 11.5–14.5)
EST. GFR  (NO RACE VARIABLE): 59 ML/MIN/1.73 M^2
GLUCOSE SERPL-MCNC: 146 MG/DL (ref 70–110)
HCT VFR BLD AUTO: 32.6 % (ref 40–54)
HGB BLD-MCNC: 10.9 G/DL (ref 14–18)
HYPOCHROMIA BLD QL SMEAR: ABNORMAL
IMM GRANULOCYTES # BLD AUTO: ABNORMAL K/UL (ref 0–0.04)
IMM GRANULOCYTES NFR BLD AUTO: ABNORMAL % (ref 0–0.5)
LYMPHOCYTES NFR BLD: 50 % (ref 18–48)
MAGNESIUM SERPL-MCNC: 1.8 MG/DL (ref 1.6–2.6)
MCH RBC QN AUTO: 38.1 PG (ref 27–31)
MCHC RBC AUTO-ENTMCNC: 33.4 G/DL (ref 32–36)
MCV RBC AUTO: 114 FL (ref 82–98)
MONOCYTES NFR BLD: 8 % (ref 4–15)
NEUTROPHILS NFR BLD: 42 % (ref 38–73)
NRBC BLD-RTO: 0 /100 WBC
PHOSPHATE SERPL-MCNC: 3.5 MG/DL (ref 2.7–4.5)
PLATELET # BLD AUTO: 20 K/UL (ref 150–450)
PLATELET BLD QL SMEAR: ABNORMAL
PMV BLD AUTO: 10.3 FL (ref 9.2–12.9)
POIKILOCYTOSIS BLD QL SMEAR: SLIGHT
POLYCHROMASIA BLD QL SMEAR: ABNORMAL
POTASSIUM SERPL-SCNC: 4.4 MMOL/L (ref 3.5–5.1)
PROT SERPL-MCNC: 5.7 G/DL (ref 6–8.4)
RBC # BLD AUTO: 2.86 M/UL (ref 4.6–6.2)
SODIUM SERPL-SCNC: 138 MMOL/L (ref 136–145)
SPECIMEN OUTDATE: NORMAL
WBC # BLD AUTO: 1.75 K/UL (ref 3.9–12.7)

## 2025-02-05 PROCEDURE — 80197 ASSAY OF TACROLIMUS: CPT | Performed by: INTERNAL MEDICINE

## 2025-02-05 PROCEDURE — 85027 COMPLETE CBC AUTOMATED: CPT | Performed by: INTERNAL MEDICINE

## 2025-02-05 PROCEDURE — 84100 ASSAY OF PHOSPHORUS: CPT | Performed by: INTERNAL MEDICINE

## 2025-02-05 PROCEDURE — 80053 COMPREHEN METABOLIC PANEL: CPT | Performed by: INTERNAL MEDICINE

## 2025-02-05 PROCEDURE — 87799 DETECT AGENT NOS DNA QUANT: CPT | Performed by: INTERNAL MEDICINE

## 2025-02-05 PROCEDURE — 83735 ASSAY OF MAGNESIUM: CPT | Performed by: INTERNAL MEDICINE

## 2025-02-05 PROCEDURE — 85007 BL SMEAR W/DIFF WBC COUNT: CPT | Performed by: INTERNAL MEDICINE

## 2025-02-05 PROCEDURE — 86900 BLOOD TYPING SEROLOGIC ABO: CPT | Performed by: INTERNAL MEDICINE

## 2025-02-05 PROCEDURE — 36415 COLL VENOUS BLD VENIPUNCTURE: CPT | Performed by: INTERNAL MEDICINE

## 2025-02-05 RX ORDER — BUDESONIDE 3 MG/1
3 CAPSULE, COATED PELLETS ORAL 3 TIMES DAILY
Qty: 90 CAPSULE | Refills: 2 | Status: SHIPPED | OUTPATIENT
Start: 2025-02-05 | End: 2026-02-05

## 2025-02-06 ENCOUNTER — PATIENT MESSAGE (OUTPATIENT)
Dept: HEMATOLOGY/ONCOLOGY | Facility: CLINIC | Age: 70
End: 2025-02-06
Payer: MEDICARE

## 2025-02-06 ENCOUNTER — DOCUMENTATION ONLY (OUTPATIENT)
Dept: HEMATOLOGY/ONCOLOGY | Facility: CLINIC | Age: 70
End: 2025-02-06
Payer: MEDICARE

## 2025-02-06 LAB
CMV DNA SPEC QL NAA+PROBE: NORMAL
CYTOMEGALOVIRUS PCR, QUANT: NOT DETECTED IU/ML
EPSTEIN-BARR VIRUS DNA: ABNORMAL
EPSTEIN-BARR VIRUS LOG (IU/ML): 2.35 LOGIU/ML
EPSTEIN-BARR VIRUS PCR, QUANT: 223 IU/ML
TACROLIMUS BLD-MCNC: 17.4 NG/ML (ref 5–15)

## 2025-02-07 ENCOUNTER — LAB VISIT (OUTPATIENT)
Dept: LAB | Facility: HOSPITAL | Age: 70
End: 2025-02-07
Payer: MEDICARE

## 2025-02-07 ENCOUNTER — OFFICE VISIT (OUTPATIENT)
Dept: HEMATOLOGY/ONCOLOGY | Facility: CLINIC | Age: 70
End: 2025-02-07
Payer: MEDICARE

## 2025-02-07 ENCOUNTER — PATIENT MESSAGE (OUTPATIENT)
Dept: HEMATOLOGY/ONCOLOGY | Facility: CLINIC | Age: 70
End: 2025-02-07

## 2025-02-07 ENCOUNTER — TELEPHONE (OUTPATIENT)
Dept: HEMATOLOGY/ONCOLOGY | Facility: CLINIC | Age: 70
End: 2025-02-07

## 2025-02-07 VITALS
TEMPERATURE: 99 F | SYSTOLIC BLOOD PRESSURE: 120 MMHG | DIASTOLIC BLOOD PRESSURE: 75 MMHG | HEIGHT: 69 IN | OXYGEN SATURATION: 100 % | HEART RATE: 89 BPM | BODY MASS INDEX: 20.29 KG/M2 | WEIGHT: 137 LBS | RESPIRATION RATE: 18 BRPM

## 2025-02-07 DIAGNOSIS — D61.818 PANCYTOPENIA: ICD-10-CM

## 2025-02-07 DIAGNOSIS — D46.9 MYELODYSPLASTIC SYNDROME: ICD-10-CM

## 2025-02-07 DIAGNOSIS — Z94.81 S/P ALLOGENEIC BONE MARROW TRANSPLANT: ICD-10-CM

## 2025-02-07 DIAGNOSIS — I95.9 HYPOTENSION, UNSPECIFIED HYPOTENSION TYPE: ICD-10-CM

## 2025-02-07 DIAGNOSIS — R35.0 URINARY FREQUENCY: ICD-10-CM

## 2025-02-07 DIAGNOSIS — E53.8 B12 DEFICIENCY: Primary | ICD-10-CM

## 2025-02-07 DIAGNOSIS — Z76.82 STEM CELL TRANSPLANT CANDIDATE: ICD-10-CM

## 2025-02-07 DIAGNOSIS — E53.8 FOLIC ACID DEFICIENCY: ICD-10-CM

## 2025-02-07 DIAGNOSIS — G47.00 INSOMNIA, UNSPECIFIED TYPE: ICD-10-CM

## 2025-02-07 DIAGNOSIS — F41.9 ANXIETY: ICD-10-CM

## 2025-02-07 DIAGNOSIS — D84.822 IMMUNODEFICIENCY DUE TO EXTERNAL CAUSE: ICD-10-CM

## 2025-02-07 DIAGNOSIS — E61.0 COPPER DEFICIENCY: ICD-10-CM

## 2025-02-07 LAB
ALBUMIN SERPL BCP-MCNC: 3.2 G/DL (ref 3.5–5.2)
ALP SERPL-CCNC: 84 U/L (ref 40–150)
ALT SERPL W/O P-5'-P-CCNC: 36 U/L (ref 10–44)
ANION GAP SERPL CALC-SCNC: 6 MMOL/L (ref 8–16)
ANISOCYTOSIS BLD QL SMEAR: SLIGHT
AST SERPL-CCNC: 37 U/L (ref 10–40)
BASOPHILS # BLD AUTO: ABNORMAL K/UL (ref 0–0.2)
BASOPHILS NFR BLD: 0 % (ref 0–1.9)
BILIRUB SERPL-MCNC: 0.4 MG/DL (ref 0.1–1)
BUN SERPL-MCNC: 17 MG/DL (ref 8–23)
CALCIUM SERPL-MCNC: 8.8 MG/DL (ref 8.7–10.5)
CHLORIDE SERPL-SCNC: 106 MMOL/L (ref 95–110)
CO2 SERPL-SCNC: 28 MMOL/L (ref 23–29)
CREAT SERPL-MCNC: 1.1 MG/DL (ref 0.5–1.4)
DACRYOCYTES BLD QL SMEAR: ABNORMAL
DIFFERENTIAL METHOD BLD: ABNORMAL
EOSINOPHIL # BLD AUTO: ABNORMAL K/UL (ref 0–0.5)
EOSINOPHIL NFR BLD: 2 % (ref 0–8)
ERYTHROCYTE [DISTWIDTH] IN BLOOD BY AUTOMATED COUNT: 17 % (ref 11.5–14.5)
EST. GFR  (NO RACE VARIABLE): >60 ML/MIN/1.73 M^2
GLUCOSE SERPL-MCNC: 113 MG/DL (ref 70–110)
HCT VFR BLD AUTO: 30.2 % (ref 40–54)
HGB BLD-MCNC: 10 G/DL (ref 14–18)
HYPOCHROMIA BLD QL SMEAR: ABNORMAL
IMM GRANULOCYTES # BLD AUTO: ABNORMAL K/UL (ref 0–0.04)
IMM GRANULOCYTES NFR BLD AUTO: ABNORMAL % (ref 0–0.5)
LYMPHOCYTES # BLD AUTO: ABNORMAL K/UL (ref 1–4.8)
LYMPHOCYTES NFR BLD: 50 % (ref 18–48)
MCH RBC QN AUTO: 38.3 PG (ref 27–31)
MCHC RBC AUTO-ENTMCNC: 33.1 G/DL (ref 32–36)
MCV RBC AUTO: 116 FL (ref 82–98)
MONOCYTES # BLD AUTO: ABNORMAL K/UL (ref 0.3–1)
MONOCYTES NFR BLD: 11 % (ref 4–15)
NEUTROPHILS NFR BLD: 37 % (ref 38–73)
NRBC BLD-RTO: 0 /100 WBC
OVALOCYTES BLD QL SMEAR: ABNORMAL
PLATELET # BLD AUTO: 21 K/UL (ref 150–450)
PLATELET BLD QL SMEAR: ABNORMAL
PMV BLD AUTO: 10.8 FL (ref 9.2–12.9)
POIKILOCYTOSIS BLD QL SMEAR: SLIGHT
POLYCHROMASIA BLD QL SMEAR: ABNORMAL
POTASSIUM SERPL-SCNC: 4.4 MMOL/L (ref 3.5–5.1)
PROT SERPL-MCNC: 5.4 G/DL (ref 6–8.4)
RBC # BLD AUTO: 2.61 M/UL (ref 4.6–6.2)
SODIUM SERPL-SCNC: 140 MMOL/L (ref 136–145)
TACROLIMUS BLD-MCNC: 12.6 NG/ML (ref 5–15)
WBC # BLD AUTO: 1.93 K/UL (ref 3.9–12.7)

## 2025-02-07 PROCEDURE — 99999 PR PBB SHADOW E&M-EST. PATIENT-LVL IV: CPT | Mod: PBBFAC,,,

## 2025-02-07 PROCEDURE — 85027 COMPLETE CBC AUTOMATED: CPT | Performed by: INTERNAL MEDICINE

## 2025-02-07 PROCEDURE — 85007 BL SMEAR W/DIFF WBC COUNT: CPT | Performed by: INTERNAL MEDICINE

## 2025-02-07 PROCEDURE — 36415 COLL VENOUS BLD VENIPUNCTURE: CPT | Performed by: INTERNAL MEDICINE

## 2025-02-07 PROCEDURE — 80053 COMPREHEN METABOLIC PANEL: CPT | Performed by: INTERNAL MEDICINE

## 2025-02-07 PROCEDURE — 80197 ASSAY OF TACROLIMUS: CPT | Performed by: INTERNAL MEDICINE

## 2025-02-07 RX ORDER — CYANOCOBALAMIN 1000 UG/ML
1000 INJECTION, SOLUTION INTRAMUSCULAR; SUBCUTANEOUS WEEKLY
Qty: 10 ML | Refills: 2 | Status: SHIPPED | OUTPATIENT
Start: 2025-02-07

## 2025-02-07 RX ORDER — TACROLIMUS 0.5 MG/1
CAPSULE ORAL
Qty: 90 CAPSULE | Refills: 5 | Status: SHIPPED | OUTPATIENT
Start: 2025-02-07 | End: 2025-02-13

## 2025-02-07 NOTE — PROGRESS NOTES
Section of Hematology and Stem Cell Transplantation    Post-Transplantation Follow Up Visit       Notes:    02/07/2025    Transplant History:   Primary oncologist: Paco Hickey MD  Primary oncologic diagnosis: MDS  Transplant date: 9/19/2024  Donor: haploidentical  Blood Type (Patient): B +  Blood Type (Donor): A +  CMV (Patient): Positive  CMV (Donor): Positive  Graft source: Bone marrow  CD34+ cell dose: 3.55x10^6  Conditioning Regimen: Fludarabine plus melphalan 100 mg/m2 + 2Gy TBI  GVHD prophylaxis: Post-transplant cyclophosphamide, Tacrolimus, MMF  Immediate post-transplant complications: Patient experienced expected GI toxicities with C diff negative diarrhea and nausea, neutropenic fever with negative infectious work up, expected cytopenias requiring transfusions, electrolyte abnormalities requiring replacement, volume overload requiring diuresis, intermittent SOB from pulmonary edema requiring 02, and a hemorrhoid flare up. Diarrhea and SOB improved towards the end of the hospital stay.     History of Present Ilness:   Guillaume Salinas (Guillaume) is a pleasant 69 y.o.male with a past medical history of MDS who is status post haploidentical stem cell transplantation conditioned with FluMel 100 + PTCy+ 2Gy TBI who is currently day +141  who presents for post-transplant follow up.  services denied and daughter translated.     Interval History:   Patient presents for routine follow-up post allogeneic transplant. He has been nauseated lately intermittently throughout the day and Zofran is not helping. He has been doing PT for his weakness. He is not having diarrhea but does acknowledge having some episodes of loser stool every now and then. He denies anymore headaches and has not noticed any skin changes. He has been experiencing dizzines at times when he goes from sit to stand. They have been taking his blood pressure at home and it has been lower so they have not been taking his Carvedilol.   He says his appetite int the past few weeks has not been great but he feels it is now starting to improve.     PAST MEDICAL HISTORY:   Past Medical History:   Diagnosis Date    Anticoagulant long-term use     Coronary artery disease     Hypertension     Myelodysplastic syndrome     Peripheral vascular disease, unspecified        PAST SURGICAL HISTORY:   Past Surgical History:   Procedure Laterality Date    BONE MARROW BIOPSY Left 2023    Procedure: Biopsy-bone marrow;  Surgeon: Harry Diamond MD;  Location: [facility] OR;  Service: Oncology;  Laterality: Left;    COLONOSCOPY N/A 2022    Procedure: COLONOSCOPY Golytely Vaccinated will bring cards;  Surgeon: Dereje Simon MD;  Location: [facility] ENDO;  Service: Endoscopy;  Laterality: N/A;  Do not cancel this order    INSERTION OF LEMONS CATHETER Right 2024    Procedure: INSERTION, CATHETER, CENTRAL VENOUS, LEMONS TRIPLE LUMEN;  Surgeon: Kg Patten MD;  Location: [address];  Service: General;  Laterality: Right;     PAST SOCIAL HISTORY:  Social History     Socioeconomic History    Marital status:    Tobacco Use    Smoking status: Former     Current packs/day: 0.00     Average packs/day: 0.3 packs/day for 50.0 years (12.5 ttl pk-yrs)     Types: Cigarettes     Start date: 3/1/1973     Quit date: 3/1/2023     Years since quittin.9     Passive exposure: Past    Smokeless tobacco: Never   Substance and Sexual Activity    Alcohol use: Not Currently    Drug use: Never    Sexual activity: Not Currently     Partners: Female     Social Drivers of Health     Financial Resource Strain: Patient Declined (2024)    Overall Financial Resource Strain (CARDIA)     Difficulty of Paying Living Expenses: Patient declined   Food Insecurity: Patient Declined (2024)    Hunger Vital Sign     Worried About Running Out of Food in the Last Year: Patient declined     Ran Out of Food in the Last Year: Patient declined   Transportation  Needs: Patient Declined (12/28/2024)    TRANSPORTATION NEEDS     Transportation : Patient declined   Physical Activity: Inactive (1/10/2025)    Exercise Vital Sign     Days of Exercise per Week: 0 days     Minutes of Exercise per Session: 10 min   Stress: Patient Declined (12/28/2024)    [employer] - Occupational Stress Questionnaire     Feeling of Stress : Patient declined   Recent Concern: Stress - Stress Concern Present (9/30/2024)    [employer] - Occupational Stress Questionnaire     Feeling of Stress : To some extent   Housing Stability: Patient Declined (12/28/2024)    Housing Stability Vital Sign     Unable to Pay for Housing in the Last Year: Patient declined     Homeless in the Last Year: Patient declined       FAMILY HISTORY:  Cancer-related family history includes Cancer in his brother and father.    CURRENT MEDICATIONS:   Medication List with Changes/Refills   New Medications    CYANOCOBALAMIN 1,000 MCG/ML INJECTION    Inject 1 mL (1,000 mcg total) into the muscle once a week.   Current Medications    ACYCLOVIR (ZOVIRAX) 800 MG TAB    Take 1 tablet (800 mg total) by mouth 2 (two) times daily.    ATOVAQUONE (MEPRON) 750 MG/5 ML SUSP ORAL LIQUID    Take 10 mLs (1,500 mg total) by mouth once daily.    BUDESONIDE (ENTOCORT EC) 3 MG CAPSULE    Take 1 capsule (3 mg total) by mouth 3 (three) times daily.    CARVEDILOL (COREG) 6.25 MG TABLET    Take 1 tablet (6.25 mg total) by mouth 2 (two) times daily.    COPPER GLUCONATE 2 MG CAP    Take 2 mg by mouth once daily.    ELTROMBOPAG OLAMINE (PROMACTA) 50 MG TAB    Take 1 tablet (50 mg total) by mouth once daily.    FOLIC ACID (FOLVITE) 1 MG TABLET    Take 1 tablet (1 mg total) by mouth once daily.    GABAPENTIN (NEURONTIN) 300 MG CAPSULE    Take 2 capsules (600 mg total) by mouth every evening.    LETERMOVIR (PREVYMIS) 480 MG TAB    Take 1 tablet (480 mg total) by mouth Daily.    LEVOFLOXACIN (LEVAQUIN)  500 MG TABLET    Take 1 tablet (500 mg total) by mouth once daily.    LOPERAMIDE (IMODIUM A-D) 2 MG TAB    Take 2 mg by mouth 4 (four) times daily as needed.    MAGNESIUM OXIDE (MAG-OX) 400 MG (241.3 MG MAGNESIUM) TABLET    Take 2 tablets (800 mg total) by mouth 2 (two) times daily.    ONDANSETRON (ZOFRAN-ODT) 8 MG TBDL    Take 1 tablet (8 mg total) by mouth every 8 (eight) hours as needed (nausea).    PANTOPRAZOLE (PROTONIX) 40 MG TABLET    Take 1 tablet (40 mg total) by mouth once daily.    POSACONAZOLE (NOXAFIL) 100 MG TBEC TABLET    Take 3 tablets (300 mg total) by mouth once daily.    PREDNISONE (DELTASONE) 10 MG TABLET    1/7 - 1/10: Prednisone 4 tablets (40mg) once a day; 1/11 - 1/14: Prednisone 2 tablets (20mg) once a day; 1/15 - 1/18: Prednisone 1 tablet (10mg) once a day; 1/19 - 1/23: Prednisone 0.5 tablet (0.5mg) once a day; 1/24 - 1/27: Prednisone 0.5 tablet (0.5mg) every other day; 1/28: STOP    ROPINIROLE (REQUIP) 0.5 MG TABLET    Take 1 tablet (0.5 mg total) by mouth every evening.    TACROLIMUS (PROGRAF) 0.5 MG CAP    Take 2 capsules (1 mg total) by mouth every morning AND 1 capsule (0.5 mg total) every evening.    ZOLPIDEM (AMBIEN) 5 MG TAB    Take 1 tablet (5 mg total) by mouth nightly as needed (insomnia).       ALLERGIES:   Review of patient's allergies indicates:  No Known Allergies    GVHD Review of Systems:     Pertinent positives and negatives included in the HPI. Otherwise a 14 point review of systems is negative. GVHD review of systems recorded in BMT flowsheet.     Physical Exam:     Vitals:    02/07/25 0848   BP: 120/75   Pulse: 89   Resp: 18   Temp: 98.6 °F (37 °C)       General: Appears well, NAD.   HEENT: MMM, no OP lesions  Pulmonary: CTAB, no increased work of breathing, no W/R/C  Cardiovascular: S1S2 normal, RRR, no M/R/G  Abdominal: Soft, NT, ND, BS+, no HSM  Extremities: No C/C/E  Neurological: AAOx4, grossly normal, no focal deficits  Dermatologic: No appreciable rashes or  lesions    ECOG Performance Status: (foot note - ECOG PS provided by Eastern Cooperative Oncology Group) 1 - Symptomatic but completely ambulatory    Karnofsky Performance Score:  80%- Normal Activity with Effort: Some Symptoms of Disease    Labs:   Lab Results   Component Value Date    WBC 1.75 (LL) 02/05/2025    HGB 10.9 (L) 02/05/2025    HCT 32.6 (L) 02/05/2025     (H) 02/05/2025    PLT 20 (LL) 02/05/2025        CMP  Sodium   Date Value Ref Range Status   02/05/2025 138 136 - 145 mmol/L Final     Potassium   Date Value Ref Range Status   02/05/2025 4.4 3.5 - 5.1 mmol/L Final     Chloride   Date Value Ref Range Status   02/05/2025 104 95 - 110 mmol/L Final     CO2   Date Value Ref Range Status   02/05/2025 25 23 - 29 mmol/L Final     Glucose   Date Value Ref Range Status   02/05/2025 146 (H) 70 - 110 mg/dL Final     BUN   Date Value Ref Range Status   02/05/2025 13 8 - 23 mg/dL Final     Creatinine   Date Value Ref Range Status   02/05/2025 1.3 0.5 - 1.4 mg/dL Final     Calcium   Date Value Ref Range Status   02/05/2025 9.1 8.7 - 10.5 mg/dL Final     Total Protein   Date Value Ref Range Status   02/05/2025 5.7 (L) 6.0 - 8.4 g/dL Final     Albumin   Date Value Ref Range Status   02/05/2025 3.5 3.5 - 5.2 g/dL Final     Total Bilirubin   Date Value Ref Range Status   02/05/2025 0.5 0.1 - 1.0 mg/dL Final     Comment:     For infants and newborns, interpretation of results should be based  on gestational age, weight and in agreement with clinical  observations.    Premature Infant recommended reference ranges:  Up to 24 hours.............<8.0 mg/dL  Up to 48 hours............<12.0 mg/dL  3-5 days..................<15.0 mg/dL  6-29 days.................<15.0 mg/dL       Alkaline Phosphatase   Date Value Ref Range Status   02/05/2025 83 40 - 150 U/L Final     AST   Date Value Ref Range Status   02/05/2025 36 10 - 40 U/L Final     ALT   Date Value Ref Range Status   02/05/2025 39 10 - 44 U/L Final     Anion Gap    Date Value Ref Range Status   02/05/2025 9 8 - 16 mmol/L Final     eGFR   Date Value Ref Range Status   02/05/2025 59 (A) >60 mL/min/1.73 m^2 Final          Imaging:   Hospital imaging reviewed.    Pathology:  Prior pathology reviewed.   Acute GVHD Scoring:  GVHD Acute Assessment      No GVHD at this time.               Assessment and Plan:   Guillaume Salinas (Guillaume) is a pleasant 69 y.o.male with a past medical history of MDS s/p haplo SCT who presents for post-transplant follow up.    MDS: Status post treatment with azacitidine 75 mg/m2 daily x7 days plus venetoclax 100mg (voriconazole) daily x14 of 28 days.Venetoclax decreased to 7 days with cycle 3 until completion of 11 cycles. No plans for maintenance at this time.     Status post allogeneic stem cell transplantation: As noted above, status post haploidentical stem cell transplantation conditioned with FluMel 100 + PTCy+ 2Gy TBI . Currently Day+ 141. Engrafted on 10/09/24 day +20.  Day 30 bone marrow (11/5/2024) showing mildly hypercellular marrow with no increased blast (0.3%) and no evidence of myeloid neoplasm. Pending chimerisms, NGS, and CG  Day 100 bone marrow biopsy on 12/31/24 showed 30-40% cellularity, erythroid hyperplasia, dyserythropoiesis, decreased megakaryocytes with atypical morphology. No hemophagocytosis mentioned. NGS, FISH, and CG normal. 100% chimerisms.     3.    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 He started budesonide 3mg TID on 10/28/24. Due to persistent nausea despite budesonide so started systemic steroids 11/1 at 0.5 mg/kg and his steroid taper has been given to him. Steroid taper completed 12/8/24. No evidence of aGVHD today. 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  Taper completed. Patient's daughter sent messages through the portal about his nausea coming back with no improvement from zofran so budesonide 3mg TID was sent in on 2/6/25  Current tacro dose: 2 capsule (1 mg) in the morning and 1 capsules (0.5 mg) in the evening.   Last tacro level: 12.6  Adjustments: Decrease to 1 capsule (0.5mg) BID      4.     Immunosuppression: Prevention with posaconazole, acyclovir. CMV prophylaxis with letermovir. PJP prophyalxis atovaquone. Continue weekly monitoring of CMV and EBV.  Last CMV: Not-detected,   last EBV:up trending at 223  Active infections: N/A    Pancytopenia: Due to underlying disease and chemotherapy. Transfuse for Hgb <7 g/dL and platelets <10k. Folate and copper deficient, on supplementation. Will continue to monitor to see if platelets begin to rise with his supplements. Promacta sent in on 12/9/24. Change TMP/SMX to atovaquone. Continue promacta and supplements listed below  Folic Acid deficiency: Continue folic acid 1mg daily  Copper deficiency: Copper level of 635, continue copper gluconate 2mg daily   B12 deficiency: B12 injections sent in today. Patient wants to come into clinic for his injections. Plan to do weekly injections for the first month and then monthly after    Post transplant lymphoproliferative disorder: Patient received rituxan on 12/30/24 due to possible PTLD with elevated EBV and possible HLH. Now EBV has improved.     Hypomagnesemia: Related to tacro, poor po intake. Continue MagOx to 800mg twice daily.      Chemotherapy Induced Nausea: Resolved with steroids and the taper originally ended 12/8/24. He ended up having nausea again while hospitalized and was again started on steroid and taper ended 1/28/25.   Patient's daughter sent messages through the portal about his nausea coming back with no improvement from zofran so budesonide 3mg TID was sent in on 2/6/25     Anxiety, Restless Leg Syndrome: Noted since discharge by family. He started  ropinirole 0.5mg and has experienced significant improvement.  Symptoms have resolved. Continue Ropinirole    Insomnia: Patient now sleeping well and only waking up to urinate at night. Continue Ambien and Ropinirole for RLS.     Urinary frequency and hesitancy: Patient with frequency and hesitancy for a few days now. No hematuria, dysuria, or urgency. Urinalysis completed today is negative for UTI.  Culture had no growth.   Symptoms are slowly improving but still have frequency with urinating once an hour. Continue to monitor.    Dizziness: Patient has been experiencing dizziness recently. He has been monitoring his blood pressure at home and it has been on the lower side so they stopped taking his Carvedilol and he has been feeling better. With his blood pressure today being 120/75 without having been taking his medication he will continue to hold the carvedilol and monitor his pressures at home for now.     Follow up: Every other week with weekly labs    Orders Placed:      Orders Placed This Encounter    HOMOCYSTEINE, SERUM    cyanocobalamin 1,000 mcg/mL injection         Medical Complexity:   Visit today included increased complexity associated with the care of the episodic problems addressed above and managing the longitudinal care of the patient due to the serious and/or complex managed problem(s) history of allo SCT.      Follow Up: Every other week with weekly labs       BMT Chart Routing      Follow up with physician 2 weeks. Ruperto or Gilma independent- Set up nurses visit weekly fo rnext month for B12 injections   Follow up with [facility]    Provider visit type    Infusion scheduling note    Injection scheduling note    Labs CBC, CMP, CMV, EBV, magnesium, phosphorus, tacrolimus level and type and screen   Scheduling:  Preferred lab:  Lab interval: once a week     Imaging    Pharmacy appointment    Other referrals                   A total of 35 minutes was spent in pre-visit chart review, personal  interpretation of labs and imaging, and medication review. Total visit time 35 minutes, >50 % counseling.       Gilma Ugalde PA-C  Malignant Hematology, Stem Cell Transplant, and Cellular Therapy  [facility] [place]

## 2025-02-12 ENCOUNTER — TELEPHONE (OUTPATIENT)
Dept: HEMATOLOGY/ONCOLOGY | Facility: CLINIC | Age: 70
End: 2025-02-12
Payer: MEDICARE

## 2025-02-12 ENCOUNTER — LAB VISIT (OUTPATIENT)
Dept: LAB | Facility: HOSPITAL | Age: 70
End: 2025-02-12
Attending: INTERNAL MEDICINE
Payer: MEDICARE

## 2025-02-12 DIAGNOSIS — D46.9 MYELODYSPLASTIC SYNDROME: ICD-10-CM

## 2025-02-12 DIAGNOSIS — Z76.82 STEM CELL TRANSPLANT CANDIDATE: ICD-10-CM

## 2025-02-12 DIAGNOSIS — E53.8 B12 DEFICIENCY: ICD-10-CM

## 2025-02-12 LAB
ALBUMIN SERPL BCP-MCNC: 3.4 G/DL (ref 3.5–5.2)
ALP SERPL-CCNC: 83 U/L (ref 40–150)
ALT SERPL W/O P-5'-P-CCNC: 40 U/L (ref 10–44)
ANION GAP SERPL CALC-SCNC: 9 MMOL/L (ref 8–16)
ANISOCYTOSIS BLD QL SMEAR: SLIGHT
AST SERPL-CCNC: 41 U/L (ref 10–40)
BASOPHILS # BLD AUTO: 0.01 K/UL (ref 0–0.2)
BASOPHILS NFR BLD: 0.5 % (ref 0–1.9)
BILIRUB SERPL-MCNC: 0.6 MG/DL (ref 0.1–1)
BUN SERPL-MCNC: 12 MG/DL (ref 8–23)
CALCIUM SERPL-MCNC: 9.1 MG/DL (ref 8.7–10.5)
CHLORIDE SERPL-SCNC: 106 MMOL/L (ref 95–110)
CO2 SERPL-SCNC: 25 MMOL/L (ref 23–29)
CREAT SERPL-MCNC: 1.2 MG/DL (ref 0.5–1.4)
DACRYOCYTES BLD QL SMEAR: ABNORMAL
DIFFERENTIAL METHOD BLD: ABNORMAL
EOSINOPHIL # BLD AUTO: 0 K/UL (ref 0–0.5)
EOSINOPHIL NFR BLD: 0.9 % (ref 0–8)
ERYTHROCYTE [DISTWIDTH] IN BLOOD BY AUTOMATED COUNT: 17 % (ref 11.5–14.5)
EST. GFR  (NO RACE VARIABLE): >60 ML/MIN/1.73 M^2
GLUCOSE SERPL-MCNC: 142 MG/DL (ref 70–110)
HCT VFR BLD AUTO: 30.6 % (ref 40–54)
HCYS SERPL-SCNC: 7.8 UMOL/L (ref 4–16.5)
HGB BLD-MCNC: 10.1 G/DL (ref 14–18)
IMM GRANULOCYTES # BLD AUTO: 0.05 K/UL (ref 0–0.04)
IMM GRANULOCYTES NFR BLD AUTO: 2.3 % (ref 0–0.5)
LYMPHOCYTES # BLD AUTO: 0.9 K/UL (ref 1–4.8)
LYMPHOCYTES NFR BLD: 40.9 % (ref 18–48)
MAGNESIUM SERPL-MCNC: 1.6 MG/DL (ref 1.6–2.6)
MCH RBC QN AUTO: 37.4 PG (ref 27–31)
MCHC RBC AUTO-ENTMCNC: 33 G/DL (ref 32–36)
MCV RBC AUTO: 113 FL (ref 82–98)
MONOCYTES # BLD AUTO: 0.4 K/UL (ref 0.3–1)
MONOCYTES NFR BLD: 16.3 % (ref 4–15)
NEUTROPHILS # BLD AUTO: 0.8 K/UL (ref 1.8–7.7)
NEUTROPHILS NFR BLD: 39.1 % (ref 38–73)
NRBC BLD-RTO: 0 /100 WBC
PHOSPHATE SERPL-MCNC: 3 MG/DL (ref 2.7–4.5)
PLATELET # BLD AUTO: 23 K/UL (ref 150–450)
PLATELET BLD QL SMEAR: ABNORMAL
PMV BLD AUTO: 11.1 FL (ref 9.2–12.9)
POIKILOCYTOSIS BLD QL SMEAR: SLIGHT
POLYCHROMASIA BLD QL SMEAR: ABNORMAL
POTASSIUM SERPL-SCNC: 3.6 MMOL/L (ref 3.5–5.1)
PROT SERPL-MCNC: 5.7 G/DL (ref 6–8.4)
RBC # BLD AUTO: 2.7 M/UL (ref 4.6–6.2)
SODIUM SERPL-SCNC: 140 MMOL/L (ref 136–145)
WBC # BLD AUTO: 2.15 K/UL (ref 3.9–12.7)

## 2025-02-12 PROCEDURE — 80053 COMPREHEN METABOLIC PANEL: CPT | Performed by: INTERNAL MEDICINE

## 2025-02-12 PROCEDURE — 83090 ASSAY OF HOMOCYSTEINE: CPT | Performed by: INTERNAL MEDICINE

## 2025-02-12 PROCEDURE — 80197 ASSAY OF TACROLIMUS: CPT | Performed by: INTERNAL MEDICINE

## 2025-02-12 PROCEDURE — 36415 COLL VENOUS BLD VENIPUNCTURE: CPT | Performed by: INTERNAL MEDICINE

## 2025-02-12 PROCEDURE — 84100 ASSAY OF PHOSPHORUS: CPT | Performed by: INTERNAL MEDICINE

## 2025-02-12 PROCEDURE — 83735 ASSAY OF MAGNESIUM: CPT | Performed by: INTERNAL MEDICINE

## 2025-02-12 PROCEDURE — 85025 COMPLETE CBC W/AUTO DIFF WBC: CPT | Performed by: INTERNAL MEDICINE

## 2025-02-12 PROCEDURE — 87799 DETECT AGENT NOS DNA QUANT: CPT | Performed by: INTERNAL MEDICINE

## 2025-02-13 ENCOUNTER — TELEPHONE (OUTPATIENT)
Dept: HEMATOLOGY/ONCOLOGY | Facility: CLINIC | Age: 70
End: 2025-02-13
Payer: MEDICARE

## 2025-02-13 ENCOUNTER — PATIENT MESSAGE (OUTPATIENT)
Dept: HEMATOLOGY/ONCOLOGY | Facility: CLINIC | Age: 70
End: 2025-02-13
Payer: MEDICARE

## 2025-02-13 DIAGNOSIS — D46.9 MYELODYSPLASTIC SYNDROME: ICD-10-CM

## 2025-02-13 LAB
CMV DNA SPEC QL NAA+PROBE: NORMAL
CYTOMEGALOVIRUS PCR, QUANT: NOT DETECTED IU/ML
EPSTEIN-BARR VIRUS DNA: ABNORMAL
EPSTEIN-BARR VIRUS LOG (IU/ML): 2.55 LOGIU/ML
EPSTEIN-BARR VIRUS PCR, QUANT: 357 IU/ML
TACROLIMUS BLD-MCNC: 5.2 NG/ML (ref 5–15)

## 2025-02-13 RX ORDER — TACROLIMUS 0.5 MG/1
CAPSULE ORAL
Qty: 90 CAPSULE | Refills: 5 | Status: SHIPPED | OUTPATIENT
Start: 2025-02-13

## 2025-02-13 NOTE — TELEPHONE ENCOUNTER
BMT Pharmacist Immunosuppression Note:      I reviewed the patient's tacrolimus level with AMY Dillard, and it is below the goal range. Instructed patient to increase your current regimen of 1 capsules (0.5mg) in the morning and 1 capsule (0.5mg) in the evening to 2 capsules (1mg) in the morning and 1 capsule (0.5mg) in the evening.     He will make this change starting 2/14/25 since the call happened around 3 PM.          Terese Cantor, PharmD  Clinical Pharmacy Specialist, Bone Marrow Transplant/Hematology  Spectra link: [id number]

## 2025-02-17 DIAGNOSIS — M79.2 NEUROPATHIC PAIN: ICD-10-CM

## 2025-02-17 RX ORDER — GABAPENTIN 300 MG/1
600 CAPSULE ORAL NIGHTLY
Start: 2025-02-17

## 2025-02-19 ENCOUNTER — LAB VISIT (OUTPATIENT)
Dept: LAB | Facility: HOSPITAL | Age: 70
End: 2025-02-19
Attending: INTERNAL MEDICINE
Payer: MEDICARE

## 2025-02-19 ENCOUNTER — TELEPHONE (OUTPATIENT)
Dept: HEMATOLOGY/ONCOLOGY | Facility: CLINIC | Age: 70
End: 2025-02-19
Payer: MEDICARE

## 2025-02-19 ENCOUNTER — PATIENT MESSAGE (OUTPATIENT)
Dept: HEMATOLOGY/ONCOLOGY | Facility: CLINIC | Age: 70
End: 2025-02-19
Payer: MEDICARE

## 2025-02-19 DIAGNOSIS — D46.9 MYELODYSPLASTIC SYNDROME: ICD-10-CM

## 2025-02-19 DIAGNOSIS — Z76.82 STEM CELL TRANSPLANT CANDIDATE: ICD-10-CM

## 2025-02-19 LAB
ABO GROUP BLD: NORMAL
ALBUMIN SERPL BCP-MCNC: 3.1 G/DL (ref 3.5–5.2)
ALP SERPL-CCNC: 104 U/L (ref 40–150)
ALT SERPL W/O P-5'-P-CCNC: 52 U/L (ref 10–44)
ANION GAP SERPL CALC-SCNC: 11 MMOL/L (ref 8–16)
ANISOCYTOSIS BLD QL SMEAR: SLIGHT
AST SERPL-CCNC: 40 U/L (ref 10–40)
BASOPHILS NFR BLD: 0 % (ref 0–1.9)
BILIRUB SERPL-MCNC: 0.5 MG/DL (ref 0.1–1)
BLD GP AB SCN CELLS X3 SERPL QL: NORMAL
BUN SERPL-MCNC: 25 MG/DL (ref 8–23)
CALCIUM SERPL-MCNC: 8.7 MG/DL (ref 8.7–10.5)
CHLORIDE SERPL-SCNC: 107 MMOL/L (ref 95–110)
CO2 SERPL-SCNC: 25 MMOL/L (ref 23–29)
CREAT SERPL-MCNC: 1 MG/DL (ref 0.5–1.4)
DIFFERENTIAL METHOD BLD: ABNORMAL
EOSINOPHIL NFR BLD: 2 % (ref 0–8)
ERYTHROCYTE [DISTWIDTH] IN BLOOD BY AUTOMATED COUNT: 17.2 % (ref 11.5–14.5)
EST. GFR  (NO RACE VARIABLE): >60 ML/MIN/1.73 M^2
GLUCOSE SERPL-MCNC: 130 MG/DL (ref 70–110)
HCT VFR BLD AUTO: 27.5 % (ref 40–54)
HGB BLD-MCNC: 9 G/DL (ref 14–18)
HYPOCHROMIA BLD QL SMEAR: ABNORMAL
IMM GRANULOCYTES # BLD AUTO: ABNORMAL K/UL (ref 0–0.04)
IMM GRANULOCYTES NFR BLD AUTO: ABNORMAL % (ref 0–0.5)
LYMPHOCYTES NFR BLD: 25 % (ref 18–48)
MAGNESIUM SERPL-MCNC: 1.2 MG/DL (ref 1.6–2.6)
MCH RBC QN AUTO: 37.8 PG (ref 27–31)
MCHC RBC AUTO-ENTMCNC: 32.7 G/DL (ref 32–36)
MCV RBC AUTO: 116 FL (ref 82–98)
MONOCYTES NFR BLD: 0 % (ref 4–15)
NEUTROPHILS NFR BLD: 69 % (ref 38–73)
NEUTS BAND NFR BLD MANUAL: 4 %
NRBC BLD-RTO: 1 /100 WBC
PHOSPHATE SERPL-MCNC: 3.9 MG/DL (ref 2.7–4.5)
PLATELET # BLD AUTO: 30 K/UL (ref 150–450)
PLATELET BLD QL SMEAR: ABNORMAL
PMV BLD AUTO: 10.7 FL (ref 9.2–12.9)
POIKILOCYTOSIS BLD QL SMEAR: SLIGHT
POLYCHROMASIA BLD QL SMEAR: ABNORMAL
POTASSIUM SERPL-SCNC: 4.2 MMOL/L (ref 3.5–5.1)
PROT SERPL-MCNC: 5.1 G/DL (ref 6–8.4)
RBC # BLD AUTO: 2.38 M/UL (ref 4.6–6.2)
RH BLD: NORMAL
SODIUM SERPL-SCNC: 143 MMOL/L (ref 136–145)
SPECIMEN OUTDATE: NORMAL
WBC # BLD AUTO: 2.01 K/UL (ref 3.9–12.7)

## 2025-02-19 PROCEDURE — 86850 RBC ANTIBODY SCREEN: CPT | Performed by: INTERNAL MEDICINE

## 2025-02-19 PROCEDURE — 84100 ASSAY OF PHOSPHORUS: CPT | Performed by: INTERNAL MEDICINE

## 2025-02-19 PROCEDURE — 85007 BL SMEAR W/DIFF WBC COUNT: CPT | Performed by: INTERNAL MEDICINE

## 2025-02-19 PROCEDURE — 87799 DETECT AGENT NOS DNA QUANT: CPT | Performed by: INTERNAL MEDICINE

## 2025-02-19 PROCEDURE — 36415 COLL VENOUS BLD VENIPUNCTURE: CPT | Performed by: INTERNAL MEDICINE

## 2025-02-19 PROCEDURE — 80053 COMPREHEN METABOLIC PANEL: CPT | Performed by: INTERNAL MEDICINE

## 2025-02-19 PROCEDURE — 86901 BLOOD TYPING SEROLOGIC RH(D): CPT | Performed by: INTERNAL MEDICINE

## 2025-02-19 PROCEDURE — 80197 ASSAY OF TACROLIMUS: CPT | Performed by: INTERNAL MEDICINE

## 2025-02-19 PROCEDURE — 85027 COMPLETE CBC AUTOMATED: CPT | Performed by: INTERNAL MEDICINE

## 2025-02-19 PROCEDURE — 83735 ASSAY OF MAGNESIUM: CPT | Performed by: INTERNAL MEDICINE

## 2025-02-19 PROCEDURE — 86900 BLOOD TYPING SEROLOGIC ABO: CPT | Performed by: INTERNAL MEDICINE

## 2025-02-20 ENCOUNTER — DOCUMENTATION ONLY (OUTPATIENT)
Dept: HEMATOLOGY/ONCOLOGY | Facility: CLINIC | Age: 70
End: 2025-02-20
Payer: MEDICARE

## 2025-02-20 ENCOUNTER — RESULTS FOLLOW-UP (OUTPATIENT)
Dept: HEMATOLOGY/ONCOLOGY | Facility: CLINIC | Age: 70
End: 2025-02-20

## 2025-02-20 LAB
CMV DNA SPEC QL NAA+PROBE: NORMAL
CYTOMEGALOVIRUS PCR, QUANT: NOT DETECTED IU/ML
EPSTEIN-BARR VIRUS DNA: ABNORMAL
EPSTEIN-BARR VIRUS LOG (IU/ML): 1.97 LOGIU/ML
EPSTEIN-BARR VIRUS PCR, QUANT: 94 IU/ML
TACROLIMUS BLD-MCNC: 9 NG/ML (ref 5–15)

## 2025-02-21 ENCOUNTER — CLINICAL SUPPORT (OUTPATIENT)
Dept: REHABILITATION | Facility: HOSPITAL | Age: 70
End: 2025-02-21
Payer: MEDICARE

## 2025-02-21 DIAGNOSIS — R68.89 DECREASED STRENGTH, ENDURANCE, AND MOBILITY: Primary | ICD-10-CM

## 2025-02-21 DIAGNOSIS — R53.1 DECREASED STRENGTH, ENDURANCE, AND MOBILITY: Primary | ICD-10-CM

## 2025-02-21 DIAGNOSIS — Z74.09 DECREASED STRENGTH, ENDURANCE, AND MOBILITY: Primary | ICD-10-CM

## 2025-02-21 PROCEDURE — 97112 NEUROMUSCULAR REEDUCATION: CPT | Mod: PN

## 2025-02-21 PROCEDURE — 97530 THERAPEUTIC ACTIVITIES: CPT | Mod: PN

## 2025-02-21 NOTE — PROGRESS NOTES
[facility]   Physical Therapy Treatment Note      Name: Guillaume Salinas  Clinic Number: [id number]    Therapy Diagnosis:   Encounter Diagnosis   Name Primary?    Decreased strength, endurance, and mobility Yes       Physician: Mohit Hickey MD    Visit Date: 2/21/2025    Physician Orders: PT Eval and Treat   Medical Diagnosis from Referral: Z94.81 (ICD-10-CM) - S/P allogeneic bone marrow transplant   Evaluation Date: 12/20/2024  Authorization Period Expiration: 12/09/2025   Plan of Care Expiration: 3/20/25  Progress Note Due: 3/20/25  Date of Surgery: 9/19/14  Visit # / Visits authorized: 5/ 10 +eval  FOTO: 1/ 3     Precautions: Standard and HTN,Myelodysplastic syndrome, PVD, CAD, chemotherapy        Time In: 1200 PM   Time Out: 1253 PM  Total Billable Time: 53 minutes    PTA Visit #: 0/5     Subjective     Patient reports: He has been feeling much better. He has gotten his BP under control. He was able to travel to [place] without any issues.   He was not compliant with home exercise program.  Response to previous treatment: tired, dizziness  Functional change: ongoing    Pain: 0/10  Location: bilateral legs     Objective    2/21/25    30 second sit to stand: 11 reps without use of hands  6 Minute walk test: 1342 ft     Treatment     Guillaume received the treatments listed below:      neuromuscular re-education activities to improve: Balance, Coordination, Proprioception, and Posture for 23 minutes. The following activities were included:  Side lying clams 3x10 ea   Supine Straight leg raise 2x10 ea  Bridges 3x10  Short arc quad 3lb 3x10 ea    therapeutic activities to improve functional performance for 30  minutes, including:    Nustep x 8 minutes  Seated march x30 3lb  Seated long arc quad 3lb x30ea  Standing hip abduction 2x10 ea  Standing heel raises 3x10   Sit to stand from raised mat x10  reassessment  2 minute walk around clinic (2 laps) NT    Patient Education and  Home Exercises       Education provided:   - updated Home exercise program     Written Home Exercises Provided: Pt instructed to continue prior HEP. Exercises were reviewed and Guillaume was able to demonstrate them prior to the end of the session.  Guillaume demonstrated good  understanding of the education provided. See Electronic Medical Record under Patient Instructions for exercises provided during therapy sessions    Assessment     Mr. Galaviz was seen in the clinic for the first time in a few weeks after a recent issue with BP. Patient wife was present for the duration and reported that his BP has since been controlled. Patient was reassessed and he displayed significant improvement in his endurance and functional strength as seen with 6 Minute walk test and 30 second sit to stand. PT gave patient an updated Home exercise program to continue to perform exercises at home when he is not in therapy.   Guillaume Is progressing well towards his goals.   Patient prognosis is Fair.     Patient will continue to benefit from skilled outpatient physical therapy to address the deficits listed in the problem list box on initial evaluation, provide pt/family education and to maximize pt's level of independence in the home and community environment.     Patient's spiritual, cultural and educational needs considered and pt agreeable to plan of care and goals.     Anticipated barriers to physical therapy: co-morbidities    Goals: Short Term Goals: 6 weeks   Patient will be independent with Home exercise program to supplement therapy progressing, not met   Patient will be able to ambulate 1300 ft with 6 Minute walk test to improve endurance for community mobility  met  Patient will be able to lift and carry groceries without difficulty to improve ability to perform functional tasks  met     Long Term Goals: 12 weeks   Patient will be able to ambulate 1400 ft or more with 6 Minute walk test to improve endurance for community mobility  progressing, not met  Patient will be able to perform 16 sit to stand on 30 second sit to  order to improve functional strength and endurance for transfers progressing, not met  Patient will improve FOTO score to 65 % to improve self perceived ability to perform functional tasks progressing, not met  Patient goal: Patient will be able to mow his lawn to return to prior level of function progressing, not met    Plan     Improve functional strength and endurance    Elvira Joyce, PT ,DPT,OCS    02/21/2025

## 2025-02-23 DIAGNOSIS — D53.9 NUTRITIONAL ANEMIA, UNSPECIFIED: ICD-10-CM

## 2025-02-23 DIAGNOSIS — D84.822 IMMUNODEFICIENCY DUE TO EXTERNAL CAUSE: ICD-10-CM

## 2025-02-23 DIAGNOSIS — G25.81 RESTLESS LEG SYNDROME: ICD-10-CM

## 2025-02-23 RX ORDER — FOLIC ACID 1 MG/1
1 TABLET ORAL DAILY
Qty: 100 TABLET | Refills: 3 | Status: SHIPPED | OUTPATIENT
Start: 2025-02-23 | End: 2026-02-23

## 2025-02-24 RX ORDER — ROPINIROLE 0.5 MG/1
0.5 TABLET, FILM COATED ORAL NIGHTLY
Qty: 30 TABLET | Refills: 11 | Status: SHIPPED | OUTPATIENT
Start: 2025-02-24 | End: 2026-02-24

## 2025-02-24 RX ORDER — ATOVAQUONE 750 MG/5ML
1500 SUSPENSION ORAL DAILY
Qty: 210 ML | Refills: 2 | Status: SHIPPED | OUTPATIENT
Start: 2025-02-24

## 2025-02-26 ENCOUNTER — CLINICAL SUPPORT (OUTPATIENT)
Dept: HEMATOLOGY/ONCOLOGY | Facility: CLINIC | Age: 70
End: 2025-02-26
Payer: MEDICARE

## 2025-02-26 ENCOUNTER — PATIENT MESSAGE (OUTPATIENT)
Dept: HEMATOLOGY/ONCOLOGY | Facility: CLINIC | Age: 70
End: 2025-02-26

## 2025-02-26 ENCOUNTER — LAB VISIT (OUTPATIENT)
Dept: LAB | Facility: HOSPITAL | Age: 70
End: 2025-02-26
Attending: INTERNAL MEDICINE
Payer: MEDICARE

## 2025-02-26 ENCOUNTER — OFFICE VISIT (OUTPATIENT)
Dept: HEMATOLOGY/ONCOLOGY | Facility: CLINIC | Age: 70
End: 2025-02-26
Payer: MEDICARE

## 2025-02-26 ENCOUNTER — TELEPHONE (OUTPATIENT)
Dept: HEMATOLOGY/ONCOLOGY | Facility: CLINIC | Age: 70
End: 2025-02-26

## 2025-02-26 VITALS
HEIGHT: 69 IN | OXYGEN SATURATION: 96 % | SYSTOLIC BLOOD PRESSURE: 105 MMHG | DIASTOLIC BLOOD PRESSURE: 55 MMHG | BODY MASS INDEX: 20.38 KG/M2 | TEMPERATURE: 97 F | WEIGHT: 137.56 LBS | HEART RATE: 83 BPM

## 2025-02-26 DIAGNOSIS — I10 PRIMARY HYPERTENSION: ICD-10-CM

## 2025-02-26 DIAGNOSIS — E53.8 B12 DEFICIENCY: Primary | ICD-10-CM

## 2025-02-26 DIAGNOSIS — T45.1X5A CINV (CHEMOTHERAPY-INDUCED NAUSEA AND VOMITING): ICD-10-CM

## 2025-02-26 DIAGNOSIS — Z76.82 STEM CELL TRANSPLANT CANDIDATE: ICD-10-CM

## 2025-02-26 DIAGNOSIS — E53.8 FOLIC ACID DEFICIENCY: ICD-10-CM

## 2025-02-26 DIAGNOSIS — E61.0 COPPER DEFICIENCY: ICD-10-CM

## 2025-02-26 DIAGNOSIS — Z94.81 S/P ALLOGENEIC BONE MARROW TRANSPLANT: ICD-10-CM

## 2025-02-26 DIAGNOSIS — E53.8 B12 DEFICIENCY: ICD-10-CM

## 2025-02-26 DIAGNOSIS — R11.2 CINV (CHEMOTHERAPY-INDUCED NAUSEA AND VOMITING): ICD-10-CM

## 2025-02-26 DIAGNOSIS — D84.822 IMMUNODEFICIENCY DUE TO EXTERNAL CAUSE: ICD-10-CM

## 2025-02-26 DIAGNOSIS — D46.9 MYELODYSPLASTIC SYNDROME: ICD-10-CM

## 2025-02-26 DIAGNOSIS — D89.813 GVHD (GRAFT VERSUS HOST DISEASE): ICD-10-CM

## 2025-02-26 DIAGNOSIS — G25.81 RESTLESS LEG SYNDROME: Primary | ICD-10-CM

## 2025-02-26 DIAGNOSIS — D61.818 PANCYTOPENIA: ICD-10-CM

## 2025-02-26 LAB
ABO GROUP BLD: NORMAL
ALBUMIN SERPL BCP-MCNC: 3.7 G/DL (ref 3.5–5.2)
ALP SERPL-CCNC: 83 U/L (ref 40–150)
ALT SERPL W/O P-5'-P-CCNC: 45 U/L (ref 10–44)
ANION GAP SERPL CALC-SCNC: 11 MMOL/L (ref 8–16)
AST SERPL-CCNC: 30 U/L (ref 10–40)
BASOPHILS NFR BLD: 0 % (ref 0–1.9)
BILIRUB SERPL-MCNC: 0.6 MG/DL (ref 0.1–1)
BLD GP AB SCN CELLS X3 SERPL QL: NORMAL
BUN SERPL-MCNC: 17 MG/DL (ref 8–23)
CALCIUM SERPL-MCNC: 9.4 MG/DL (ref 8.7–10.5)
CHLORIDE SERPL-SCNC: 102 MMOL/L (ref 95–110)
CO2 SERPL-SCNC: 28 MMOL/L (ref 23–29)
CREAT SERPL-MCNC: 0.9 MG/DL (ref 0.5–1.4)
DIFFERENTIAL METHOD BLD: ABNORMAL
EOSINOPHIL NFR BLD: 0 % (ref 0–8)
ERYTHROCYTE [DISTWIDTH] IN BLOOD BY AUTOMATED COUNT: 17.6 % (ref 11.5–14.5)
EST. GFR  (NO RACE VARIABLE): >60 ML/MIN/1.73 M^2
GLUCOSE SERPL-MCNC: 131 MG/DL (ref 70–110)
HCT VFR BLD AUTO: 33.6 % (ref 40–54)
HGB BLD-MCNC: 11.2 G/DL (ref 14–18)
IMM GRANULOCYTES # BLD AUTO: ABNORMAL K/UL (ref 0–0.04)
IMM GRANULOCYTES NFR BLD AUTO: ABNORMAL % (ref 0–0.5)
LYMPHOCYTES NFR BLD: 17 % (ref 18–48)
MAGNESIUM SERPL-MCNC: 1.6 MG/DL (ref 1.6–2.6)
MCH RBC QN AUTO: 38.6 PG (ref 27–31)
MCHC RBC AUTO-ENTMCNC: 33.3 G/DL (ref 32–36)
MCV RBC AUTO: 116 FL (ref 82–98)
MONOCYTES NFR BLD: 2 % (ref 4–15)
NEUTROPHILS NFR BLD: 81 % (ref 38–73)
NRBC BLD-RTO: 1 /100 WBC
PHOSPHATE SERPL-MCNC: 3.9 MG/DL (ref 2.7–4.5)
PLATELET # BLD AUTO: 40 K/UL (ref 150–450)
PLATELET BLD QL SMEAR: ABNORMAL
PMV BLD AUTO: 11.5 FL (ref 9.2–12.9)
POTASSIUM SERPL-SCNC: 4.1 MMOL/L (ref 3.5–5.1)
PROT SERPL-MCNC: 5.9 G/DL (ref 6–8.4)
RBC # BLD AUTO: 2.9 M/UL (ref 4.6–6.2)
RH BLD: NORMAL
SODIUM SERPL-SCNC: 141 MMOL/L (ref 136–145)
SPECIMEN OUTDATE: NORMAL
WBC # BLD AUTO: 2.94 K/UL (ref 3.9–12.7)

## 2025-02-26 PROCEDURE — 87799 DETECT AGENT NOS DNA QUANT: CPT | Performed by: INTERNAL MEDICINE

## 2025-02-26 PROCEDURE — 83735 ASSAY OF MAGNESIUM: CPT | Performed by: INTERNAL MEDICINE

## 2025-02-26 PROCEDURE — 84100 ASSAY OF PHOSPHORUS: CPT | Performed by: INTERNAL MEDICINE

## 2025-02-26 PROCEDURE — 86850 RBC ANTIBODY SCREEN: CPT | Performed by: INTERNAL MEDICINE

## 2025-02-26 PROCEDURE — 80197 ASSAY OF TACROLIMUS: CPT | Performed by: INTERNAL MEDICINE

## 2025-02-26 PROCEDURE — 85027 COMPLETE CBC AUTOMATED: CPT | Performed by: INTERNAL MEDICINE

## 2025-02-26 PROCEDURE — 86900 BLOOD TYPING SEROLOGIC ABO: CPT | Performed by: INTERNAL MEDICINE

## 2025-02-26 PROCEDURE — 80053 COMPREHEN METABOLIC PANEL: CPT | Performed by: INTERNAL MEDICINE

## 2025-02-26 PROCEDURE — 36415 COLL VENOUS BLD VENIPUNCTURE: CPT | Performed by: INTERNAL MEDICINE

## 2025-02-26 PROCEDURE — 85007 BL SMEAR W/DIFF WBC COUNT: CPT | Performed by: INTERNAL MEDICINE

## 2025-02-26 PROCEDURE — 86901 BLOOD TYPING SEROLOGIC RH(D): CPT | Performed by: INTERNAL MEDICINE

## 2025-02-26 PROCEDURE — 99999 PR PBB SHADOW E&M-EST. PATIENT-LVL IV: CPT | Mod: PBBFAC,,,

## 2025-02-26 RX ORDER — CARVEDILOL 3.12 MG/1
3.12 TABLET ORAL 2 TIMES DAILY
Qty: 60 TABLET | Refills: 11 | Status: SHIPPED | OUTPATIENT
Start: 2025-02-26 | End: 2026-02-26

## 2025-02-26 RX ORDER — GABAPENTIN 300 MG/1
600 CAPSULE ORAL NIGHTLY PRN
Qty: 60 CAPSULE | Refills: 2 | Status: SHIPPED | OUTPATIENT
Start: 2025-02-26

## 2025-02-26 RX ORDER — CYANOCOBALAMIN 1000 UG/ML
1000 INJECTION, SOLUTION INTRAMUSCULAR; SUBCUTANEOUS ONCE
Status: COMPLETED | OUTPATIENT
Start: 2025-02-26 | End: 2025-02-26

## 2025-02-26 RX ADMIN — CYANOCOBALAMIN 1000 MCG: 1000 INJECTION, SOLUTION INTRAMUSCULAR; SUBCUTANEOUS at 03:02

## 2025-02-26 NOTE — PROGRESS NOTES
Section of Hematology and Stem Cell Transplantation    Post-Transplantation Follow Up Visit       Notes:    02/26/2025    Transplant History:   Primary oncologist: Paco Hickey MD  Primary oncologic diagnosis: MDS  Transplant date: 9/19/2024  Donor: haploidentical  Blood Type (Patient): B +  Blood Type (Donor): A +  CMV (Patient): Positive  CMV (Donor): Positive  Graft source: Bone marrow  CD34+ cell dose: 3.55x10^6  Conditioning Regimen: Fludarabine plus melphalan 100 mg/m2 + 2Gy TBI  GVHD prophylaxis: Post-transplant cyclophosphamide, Tacrolimus, MMF  Immediate post-transplant complications: Patient experienced expected GI toxicities with C diff negative diarrhea and nausea, neutropenic fever with negative infectious work up, expected cytopenias requiring transfusions, electrolyte abnormalities requiring replacement, volume overload requiring diuresis, intermittent SOB from pulmonary edema requiring 02, and a hemorrhoid flare up. Diarrhea and SOB improved towards the end of the hospital stay.     History of Present Ilness:   Guillaume Salinas (Guillaume) is a pleasant 69 y.o.male with a past medical history of MDS who is status post haploidentical stem cell transplantation conditioned with FluMel 100 + PTCy+ 2Gy TBI who is currently day +160  who presents for post-transplant follow up.  services denied and daughter translated.     Interval History:   Patient presents for routine follow-up post allogeneic transplant. His nausea is doing better right now. He has been eating and drinking very well. His energy is doing great. No diarrhea or rashes. He has been taking his blood pressure at home and has noticed it being on the higher side. He has some bruising and petechiae on his arms.       PAST MEDICAL HISTORY:   Past Medical History:   Diagnosis Date    Anticoagulant long-term use     Coronary artery disease     Hypertension     Myelodysplastic syndrome     Peripheral vascular disease,  unspecified        PAST SURGICAL HISTORY:   Past Surgical History:   Procedure Laterality Date    BONE MARROW BIOPSY Left 2023    Procedure: Biopsy-bone marrow;  Surgeon: Harry Diamond MD;  Location: [facility] OR;  Service: Oncology;  Laterality: Left;    COLONOSCOPY N/A 2022    Procedure: COLONOSCOPY Golytely Vaccinated will bring cards;  Surgeon: Dereje Simon MD;  Location: [facility] ENDO;  Service: Endoscopy;  Laterality: N/A;  Do not cancel this order    INSERTION OF LEMONS CATHETER Right 2024    Procedure: INSERTION, CATHETER, CENTRAL VENOUS, LEMONS TRIPLE LUMEN;  Surgeon: Kg Patten MD;  Location: [facility] OR [address] [place] [address];  Service: General;  Laterality: Right;     PAST SOCIAL HISTORY:  Social History     Socioeconomic History    Marital status:    Tobacco Use    Smoking status: Former     Current packs/day: 0.00     Average packs/day: 0.3 packs/day for 50.0 years (12.5 ttl pk-yrs)     Types: Cigarettes     Start date: 3/1/1973     Quit date: 3/1/2023     Years since quittin.9     Passive exposure: Past    Smokeless tobacco: Never   Substance and Sexual Activity    Alcohol use: Not Currently    Drug use: Never    Sexual activity: Not Currently     Partners: Female     Social Drivers of Health     Financial Resource Strain: Patient Declined (2024)    Overall Financial Resource Strain (CARDIA)     Difficulty of Paying Living Expenses: Patient declined   Food Insecurity: Patient Declined (2024)    Hunger Vital Sign     Worried About Running Out of Food in the Last Year: Patient declined     Ran Out of Food in the Last Year: Patient declined   Transportation Needs: Patient Declined (2024)    TRANSPORTATION NEEDS     Transportation : Patient declined   Physical Activity: Inactive (1/10/2025)    Exercise Vital Sign     Days of Exercise per Week: 0 days     Minutes of Exercise per Session: 10 min   Stress: Patient Declined (2024)    [employer] - Occupational Stress Questionnaire     Feeling of Stress : Patient declined   Recent Concern: Stress - Stress Concern Present (9/30/2024)    [facility] [employer] - Occupational Stress Questionnaire     Feeling of Stress : To some extent   Housing Stability: Patient Declined (12/28/2024)    Housing Stability Vital Sign     Unable to Pay for Housing in the Last Year: Patient declined     Homeless in the Last Year: Patient declined       FAMILY HISTORY:  Cancer-related family history includes Cancer in his brother and father.    CURRENT MEDICATIONS:   Medication List with Changes/Refills   New Medications    GABAPENTIN (NEURONTIN) 300 MG CAPSULE    Take 2 capsules (600 mg total) by mouth nightly as needed (Restless leg).   Current Medications    ACYCLOVIR (ZOVIRAX) 800 MG TAB    Take 1 tablet (800 mg total) by mouth 2 (two) times daily.    ATOVAQUONE (MEPRON) 750 MG/5 ML SUSP ORAL LIQUID    Take 10 mLs (1,500 mg total) by mouth once daily.    BUDESONIDE (ENTOCORT EC) 3 MG CAPSULE    Take 1 capsule (3 mg total) by mouth 3 (three) times daily.    CARVEDILOL (COREG) 6.25 MG TABLET    Take 1 tablet (6.25 mg total) by mouth 2 (two) times daily.    CYANOCOBALAMIN 1,000 MCG/ML INJECTION    Inject 1 mL (1,000 mcg total) into the muscle once a week.    ELTROMBOPAG OLAMINE (PROMACTA) 50 MG TAB    Take 1 tablet (50 mg total) by mouth once daily.    FOLIC ACID (FOLVITE) 1 MG TABLET    Take 1 tablet (1 mg total) by mouth once daily.    LETERMOVIR (PREVYMIS) 480 MG TAB    Take 1 tablet (480 mg total) by mouth Daily.    LEVOFLOXACIN (LEVAQUIN) 500 MG TABLET    Take 1 tablet (500 mg total) by mouth once daily.    LOPERAMIDE (IMODIUM A-D) 2 MG TAB    Take 2 mg by mouth 4 (four) times daily as needed.    MAGNESIUM OXIDE (MAG-OX) 400 MG (241.3 MG MAGNESIUM) TABLET    Take 2 tablets (800 mg total) by mouth 2 (two) times daily.    ONDANSETRON (ZOFRAN-ODT) 8 MG TBDL    Take 1 tablet  (8 mg total) by mouth every 8 (eight) hours as needed (nausea).    PANTOPRAZOLE (PROTONIX) 40 MG TABLET    Take 1 tablet (40 mg total) by mouth once daily.    POSACONAZOLE (NOXAFIL) 100 MG TBEC TABLET    Take 3 tablets (300 mg total) by mouth once daily.    ROPINIROLE (REQUIP) 0.5 MG TABLET    Take 1 tablet (0.5 mg total) by mouth every evening.    TACROLIMUS (PROGRAF) 0.5 MG CAP    Take 2 capsules (1 mg total) by mouth every morning AND 1 capsule (0.5 mg total) every evening.    ZOLPIDEM (AMBIEN) 5 MG TAB    Take 1 tablet (5 mg total) by mouth nightly as needed (insomnia).   Discontinued Medications    GABAPENTIN (NEURONTIN) 300 MG CAPSULE    Take 2 capsules (600 mg total) by mouth every evening.       ALLERGIES:   Review of patient's allergies indicates:  No Known Allergies    GVHD Review of Systems:     Pertinent positives and negatives included in the HPI. Otherwise a 14 point review of systems is negative. GVHD review of systems recorded in BMT flowsheet.     Physical Exam:     Vitals:    02/26/25 1421   BP: (!) 105/55   Pulse: 83   Temp: 97.3 °F (36.3 °C)         General: Appears well, NAD.   HEENT: MMM, no OP lesions  Pulmonary: CTAB, no increased work of breathing, no W/R/C  Cardiovascular: S1S2 normal, RRR, no M/R/G  Abdominal: Soft, NT, ND, BS+, no HSM  Extremities: No C/C/E  Neurological: AAOx4, grossly normal, no focal deficits  Dermatologic: Bruises and petechiae on arms bilaterally    ECOG Performance Status: (foot note - ECOG PS provided by Eastern Cooperative Oncology Group) 1 - Symptomatic but completely ambulatory    Karnofsky Performance Score:  80%- Normal Activity with Effort: Some Symptoms of Disease    Labs:   Lab Results   Component Value Date    WBC 2.94 (L) 02/26/2025    HGB 11.2 (L) 02/26/2025    HCT 33.6 (L) 02/26/2025     (H) 02/26/2025    PLT 40 (L) 02/26/2025        CMP  Sodium   Date Value Ref Range Status   02/26/2025 141 136 - 145 mmol/L Final     Potassium   Date Value Ref  Range Status   02/26/2025 4.1 3.5 - 5.1 mmol/L Final     Chloride   Date Value Ref Range Status   02/26/2025 102 95 - 110 mmol/L Final     CO2   Date Value Ref Range Status   02/26/2025 28 23 - 29 mmol/L Final     Glucose   Date Value Ref Range Status   02/26/2025 131 (H) 70 - 110 mg/dL Final     BUN   Date Value Ref Range Status   02/26/2025 17 8 - 23 mg/dL Final     Creatinine   Date Value Ref Range Status   02/26/2025 0.9 0.5 - 1.4 mg/dL Final     Calcium   Date Value Ref Range Status   02/26/2025 9.4 8.7 - 10.5 mg/dL Final     Total Protein   Date Value Ref Range Status   02/26/2025 5.9 (L) 6.0 - 8.4 g/dL Final     Albumin   Date Value Ref Range Status   02/26/2025 3.7 3.5 - 5.2 g/dL Final     Total Bilirubin   Date Value Ref Range Status   02/26/2025 0.6 0.1 - 1.0 mg/dL Final     Comment:     For infants and newborns, interpretation of results should be based  on gestational age, weight and in agreement with clinical  observations.    Premature Infant recommended reference ranges:  Up to 24 hours.............<8.0 mg/dL  Up to 48 hours............<12.0 mg/dL  3-5 days..................<15.0 mg/dL  6-29 days.................<15.0 mg/dL       Alkaline Phosphatase   Date Value Ref Range Status   02/26/2025 83 40 - 150 U/L Final     AST   Date Value Ref Range Status   02/26/2025 30 10 - 40 U/L Final     ALT   Date Value Ref Range Status   02/26/2025 45 (H) 10 - 44 U/L Final     Anion Gap   Date Value Ref Range Status   02/26/2025 11 8 - 16 mmol/L Final     eGFR   Date Value Ref Range Status   02/26/2025 >60 >60 mL/min/1.73 m^2 Final          Imaging:   Hospital imaging reviewed.    Pathology:  Prior pathology reviewed.   Acute GVHD Scoring:  GVHD Acute Assessment      No GVHD at this time.               Assessment and Plan:   Guillaume Salinas (Guillaume) is a pleasant 69 y.o.male with a past medical history of MDS s/p haplo SCT who presents for post-transplant follow up.    MDS: Status post treatment with  azacitidine 75 mg/m2 daily x7 days plus venetoclax 100mg (voriconazole) daily x14 of 28 days.Venetoclax decreased to 7 days with cycle 3 until completion of 11 cycles. No plans for maintenance at this time.     Status post allogeneic stem cell transplantation: As noted above, status post haploidentical stem cell transplantation conditioned with FluMel 100 + PTCy+ 2Gy TBI . Currently Day+ 160. Engrafted on 10/09/24 day +20.  Day 30 bone marrow (11/5/2024) showing mildly hypercellular marrow with no increased blast (0.3%) and no evidence of myeloid neoplasm. Pending chimerisms, NGS, and CG  Day 100 bone marrow biopsy on 12/31/24 showed 30-40% cellularity, erythroid hyperplasia, dyserythropoiesis, decreased megakaryocytes with atypical morphology. No hemophagocytosis mentioned. NGS, FISH, and CG normal. 100% chimerisms.     3.    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 He started budesonide 3mg TID on 10/28/24. Due to persistent nausea despite budesonide so started systemic steroids 11/1 at 0.5 mg/kg and his steroid taper has been given to him. Steroid taper completed 12/8/24. No evidence of aGVHD today. 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 Taper completed. Patient's daughter sent messages through the portal about his nausea coming back with no improvement from zofran so budesonide 3mg TID was sent in on 2/6/25  Current tacro dose: 2 capsule (1 mg) in the morning and 1 capsules (0.5 mg) in the evening.   Last tacro level: Pending today  Adjustments: Will adjust accordingly if needed when level results.   Will begin budesonide taper with improvement of nausea   2/27-3/5: Take budesonide twice a day    3/6-3/12: take budesonide once a day   3/13: STOP    4.      Immunosuppression: Prevention with posaconazole, acyclovir. CMV prophylaxis with letermovir. PJP prophyalxis atovaquone. Continue weekly monitoring of CMV and EBV.  Last CMV: Not-detected,   last EBV:up trending at 223  Active infections: N/A    Pancytopenia: Due to underlying disease and chemotherapy. Transfuse for Hgb <7 g/dL and platelets <10k. Folate and copper deficient, on supplementation. Will continue to monitor to see if platelets begin to rise with his supplements. Promacta sent in on 12/9/24. Change TMP/SMX to atovaquone. Continue promacta and supplements listed below  Folic Acid deficiency: Continue folic acid 1mg daily  Copper deficiency: Copper level of 635, continue copper gluconate 2mg daily   B12 deficiency: B12 injections sent in today. Patient wants to come into clinic for his injections. Plan to do weekly injections for the first month and then monthly after    Post transplant lymphoproliferative disorder: Patient received rituxan on 12/30/24 due to possible PTLD with elevated EBV and possible HLH. Now EBV has improved.     Hypomagnesemia: Related to tacro, poor po intake. Continue MagOx to 800mg twice daily.      Chemotherapy Induced Nausea: Resolved with steroids and the taper originally ended 12/8/24. He ended up having nausea again while hospitalized and was again started on steroid and taper ended 1/28/25. Patient's daughter sent messages through the portal about his nausea coming back with no improvement from zofran so budesonide 3mg TID was sent in on 2/6/25.  Now improving so we will begin to taper budesonide tomorrow      Anxiety, Restless Leg Syndrome: Noted since discharge by family. He started ropinirole 0.5mg and has experienced significant improvement.  Symptoms have resolved. Continue Ropinirole    Insomnia: Patient now sleeping well and only waking up to urinate at night. Continue Ambien and Ropinirole for RLS.      Hypertension: For awhile patient had been holding off of his  carvedilol due to hypotensions and dizziness. His pressures at home have been averaging 170s/90s so he started the carvedilol 6.25mg BID again. Today he complains of orthostatic hypotension symptoms when he takes the medication and BP in clinic is 105/55. Sending in carvedilol 3.125mg BID to see if this can fix him going from hypertensive to hypotensive.     Follow up: Every other week with weekly labs    Orders Placed:      Orders Placed This Encounter    gabapentin (NEURONTIN) 300 MG capsule           Medical Complexity:   Visit today included increased complexity associated with the care of the episodic problems addressed above and managing the longitudinal care of the patient due to the serious and/or complex managed problem(s) history of allo SCT.      Follow Up: Every other week with weekly labs       BMT Chart Routing      Follow up with physician    Follow up with [facility] 2 weeks.   Provider visit type    Infusion scheduling note    Injection scheduling note    Labs CBC, CMP, magnesium, phosphorus, tacrolimus level, type and screen, CMV and EBV   Scheduling:  Preferred lab:  Lab interval: once a week  [place]   Imaging    Pharmacy appointment    Other referrals               A total of 35 minutes was spent in pre-visit chart review, personal interpretation of labs and imaging, and medication review. Total visit time 35 minutes, >50 % counseling.       Gilma Ugalde PA-C  Malignant Hematology, Stem Cell Transplant, and Cellular Therapy  [facility]  [facility]

## 2025-02-27 ENCOUNTER — OFFICE VISIT (OUTPATIENT)
Dept: PALLIATIVE MEDICINE | Facility: CLINIC | Age: 70
End: 2025-02-27
Payer: MEDICARE

## 2025-02-27 VITALS
DIASTOLIC BLOOD PRESSURE: 67 MMHG | BODY MASS INDEX: 20.32 KG/M2 | SYSTOLIC BLOOD PRESSURE: 109 MMHG | HEART RATE: 88 BPM | WEIGHT: 137.56 LBS | OXYGEN SATURATION: 98 %

## 2025-02-27 DIAGNOSIS — Z76.82 STEM CELL TRANSPLANT CANDIDATE: ICD-10-CM

## 2025-02-27 DIAGNOSIS — R10.9 ABDOMINAL PAIN, UNSPECIFIED ABDOMINAL LOCATION: ICD-10-CM

## 2025-02-27 DIAGNOSIS — M79.605 PAIN IN BOTH LOWER EXTREMITIES: ICD-10-CM

## 2025-02-27 DIAGNOSIS — D46.9 MYELODYSPLASTIC SYNDROME: Primary | ICD-10-CM

## 2025-02-27 DIAGNOSIS — Z91.89 ADJUSTMENT TO LIFE THREATENING ILLNESS: ICD-10-CM

## 2025-02-27 DIAGNOSIS — Z51.5 ENCOUNTER FOR PALLIATIVE CARE: ICD-10-CM

## 2025-02-27 DIAGNOSIS — R53.0 NEOPLASTIC (MALIGNANT) RELATED FATIGUE: ICD-10-CM

## 2025-02-27 DIAGNOSIS — M79.604 PAIN IN BOTH LOWER EXTREMITIES: ICD-10-CM

## 2025-02-27 DIAGNOSIS — G47.00 INSOMNIA, UNSPECIFIED TYPE: ICD-10-CM

## 2025-02-27 DIAGNOSIS — Z71.89 ADVANCED CARE PLANNING/COUNSELING DISCUSSION: ICD-10-CM

## 2025-02-27 LAB
CMV DNA SPEC QL NAA+PROBE: NORMAL
CYTOMEGALOVIRUS PCR, QUANT: NOT DETECTED IU/ML
EPSTEIN-BARR VIRUS DNA: ABNORMAL
EPSTEIN-BARR VIRUS PCR, QUANT: ABNORMAL IU/ML
TACROLIMUS BLD-MCNC: 10.5 NG/ML (ref 5–15)

## 2025-02-27 PROCEDURE — 1101F PT FALLS ASSESS-DOCD LE1/YR: CPT | Mod: CPTII,S$GLB,, | Performed by: STUDENT IN AN ORGANIZED HEALTH CARE EDUCATION/TRAINING PROGRAM

## 2025-02-27 PROCEDURE — 99999 PR PBB SHADOW E&M-EST. PATIENT-LVL III: CPT | Mod: PBBFAC,,, | Performed by: STUDENT IN AN ORGANIZED HEALTH CARE EDUCATION/TRAINING PROGRAM

## 2025-02-27 PROCEDURE — 1123F ACP DISCUSS/DSCN MKR DOCD: CPT | Mod: CPTII,S$GLB,, | Performed by: STUDENT IN AN ORGANIZED HEALTH CARE EDUCATION/TRAINING PROGRAM

## 2025-02-27 PROCEDURE — 99214 OFFICE O/P EST MOD 30 MIN: CPT | Mod: S$GLB,,, | Performed by: STUDENT IN AN ORGANIZED HEALTH CARE EDUCATION/TRAINING PROGRAM

## 2025-02-27 PROCEDURE — 3078F DIAST BP <80 MM HG: CPT | Mod: CPTII,S$GLB,, | Performed by: STUDENT IN AN ORGANIZED HEALTH CARE EDUCATION/TRAINING PROGRAM

## 2025-02-27 PROCEDURE — 1160F RVW MEDS BY RX/DR IN RCRD: CPT | Mod: CPTII,S$GLB,, | Performed by: STUDENT IN AN ORGANIZED HEALTH CARE EDUCATION/TRAINING PROGRAM

## 2025-02-27 PROCEDURE — 3074F SYST BP LT 130 MM HG: CPT | Mod: CPTII,S$GLB,, | Performed by: STUDENT IN AN ORGANIZED HEALTH CARE EDUCATION/TRAINING PROGRAM

## 2025-02-27 PROCEDURE — 3008F BODY MASS INDEX DOCD: CPT | Mod: CPTII,S$GLB,, | Performed by: STUDENT IN AN ORGANIZED HEALTH CARE EDUCATION/TRAINING PROGRAM

## 2025-02-27 PROCEDURE — 1159F MED LIST DOCD IN RCRD: CPT | Mod: CPTII,S$GLB,, | Performed by: STUDENT IN AN ORGANIZED HEALTH CARE EDUCATION/TRAINING PROGRAM

## 2025-02-27 PROCEDURE — 3288F FALL RISK ASSESSMENT DOCD: CPT | Mod: CPTII,S$GLB,, | Performed by: STUDENT IN AN ORGANIZED HEALTH CARE EDUCATION/TRAINING PROGRAM

## 2025-02-27 NOTE — PROGRESS NOTES
Palliative Medicine Clinic Note        Consult Requested By: Dr. Mohit Hickey      Reason for Consult/Chief Complaint: Symptom management and ACP in the setting of MDS    Patient was offered a  by this provider for the visit; he declined and requested that his wife serve as his      ASSESSMENT/PLAN:      Plan/Recommendations:    Guillaume was seen today for follow up.    Diagnoses and all orders for this visit:    Myelodysplastic syndrome/Stem cell transplant candidate  - followed by Dr. Hickey  - s/p haploSCT conditioned with +2Gy TBI  - currently on promacta  - s/p bone marrow biopsy after day 100 completed on 12/31/2024   - recently hospitalized at end of December 2024    Encounter for palliative care/Advance Care Planning   - patient decisional and accompanied by his wife today  - no ACP documents uploaded into EMR  - philosophy of palliative medicine and new patient folder in English provided at first visit, though requested that [facility] staff send new patient folder information in Slovak to patient after completion of visit. [facility] RN acknowledged request and reports she will send the Slovak information to patient/family  - new patient folder in English and ACP booklet in English and Slovak provided to patient/family previously   - patient to review with family and will work to complete documents at future visits  - goals: life prolonging  - code status not discussed     Pain in both lower extremities/Abdominal pain  - stable/controlled   - at last visit he notes that his legs hurt with walking; he has been diagnosed with PAD previously. He has been unable to complete treatment for PAD secondary to treatment of MDS. Pain improves with rest  - patient with weakness in left arm and legs; patient working with PT  - injection site pain resolved  - does not need opioid medication at this time  - opioid safety sheet in new patient folder for patient/family to review  - will  prescribe narcan in future if opioids are needed    Neoplastic (malignant) related fatigue/Insomnia  - patient with improved fatigue and insomnia; still with ongoing insomnia though notes he feels more rested in AM when he wakes up  - at first visit: he notes that insomnia is his worst problem. He has been having worsening anxiety for past two to three years  - patient still wakes up multiple times at night   - patient now taking ambien 5 mg qhs and ropinirole 0.5 mg qhs  - discussed sleep hygiene and tip sheet for better sleep in new patient folder for patient/family to review    Adjustment to life threatening illness  - patient denies any anxiety/depression on ESAS and feels that his mood is good  - he reports that he is not consciously aware of any worries but he is not sure if subconsciously his worries are affecting him  - emotional support provided today  - hold on pharmacologic intervention at this time    Advance Care Planning   Advance Directives:   Living Will: No    [place]: No    Do Not Resuscitate Status: No    Medical Power of : No        Oral Declaration: Yes   Witnesses:  Niesha Patrick LCSW   Agent's Name:  Yennifer Thomas   Agent's Contact Number:  [phone]    Decision Making:  Patient answered questions and Family answered questions  Goals of Care: Advance Care Planning    Date: 02/27/2025    [facility]  I engaged the patient and family in a voluntary conversation about advance care planning and we specifically addressed what the goals of care would be moving forward, in light of the patient's change in clinical status, specifically MDS.  We did specifically address the patient's likely prognosis, which is good .  We explored the patient's values and preferences for future care.  The patient and family endorses that what is most important right now is to focus on remaining as independent as possible    Accordingly, we have decided that the best plan to meet the patient's goals includes  "continuing with treatment                Follow up: 8 weeks     Plan discussed with: oncologist       SUBJECTIVE:      History of Present Illness / Interval History:  Guillaume Salinas is 69 y.o. male with emphysema, right iliac artery stenosis, CAD, HTN, HLD, CKD stage III, and MDS presents to Palliative Care Clinic for physical symptoms, advance care planning,, clarification of goals of care, and additional support.. Please see oncology notes for more details on oncologic history and treatment course.       2/27/25  History of Present Illness    CHIEF COMPLAINT:  - Patient presents for a follow-up visit after a recent hospitalization and to discuss his ongoing recovery post-transplant.    HISTORY OBTAINED FROM:  - Patient, Wife    HPI:  Patient reports overall improvement since his last visit, with his numbers "going up a little." He recently experienced a setback during the holiday season, requiring a two-week hospitalization for complications described as "bad, bad, bad." Despite this, he has recovered well. Patient currently has no pain and his sleep has improved, only waking to use the restroom and then returning to sleep easily. His mood is reported as "super" and he feels well overall. Recent adjustments to his medications include changes to tacrolimus dosing and attempts to reduce budesonide, a steroid for nasal symptoms. There have also been changes to his blood pressure medication, with carvedilol needing to be picked up from the pharmacy. Patient's immune system is described as being "very new, very low as a baby with no vaccines at all," which has led to travel restrictions, including cancellation of a planned family cruise. Physical therapy was suspended after his recent relapse. Patient and his wife express optimism about returning to a normal life soon.    GOALS OF CARE/ADVANCED DIRECTIVES:  - patient and his wife desire for him to have "a normal life soon," describing it as "a very basic " "and simple but meaningful life for us"  - patient's wife is heavily involved in his care and decision-making process  - wife states she would only consider going on a family trip if her  "has a good, good...  - taking care of"  - patient and his wife demonstrate an understanding of his current health status, including the risks associated with his weakened immune system post-transplant  - patient lives at home with his wife, who provides care and support  - family involvement is significant, with mentions of children and grandchildren visiting and providing support  - quality of life for the patient and his wife involves being at home in their peaceful environment and enjoying visits from family members, especially grandchildren    ACTIVITIES OF DAILY LIVING:  - wakes up at night to use the restroom but is able to return to sleep immediately after, which is an improvement from before  - enjoys being at home and describes their house as having a quiet and peaceful environment  - social activities include having family members, including grandchildren, visit their home             Please see previous notes for full details of encounters on 08/12/2024; 10/23/2024; 12/11/2024;     ROS:  Review of Systems   Constitutional:  Positive for activity change (improving) and fatigue (improving). Negative for appetite change.   HENT: Negative.     Eyes: Negative.    Respiratory: Negative.     Cardiovascular: Negative.    Gastrointestinal: Negative.  Negative for abdominal pain, constipation, diarrhea and nausea.   Genitourinary: Negative.    Musculoskeletal:  Positive for leg pain (stable).   Neurological:  Positive for weakness (left side; improving). Negative for syncope.   Psychiatric/Behavioral:  Positive for sleep disturbance. Negative for dysphoric mood. The patient is not nervous/anxious.    All other systems reviewed and are negative.      Review of Symptoms      Symptom Assessment (ESAS 0-10 Scale)  Pain:  " 0  Dyspnea:  0  Anxiety:  0  Nausea:  0  Depression:  0  Anorexia:  0  Fatigue:  0  Insomnia:  0  Restlessness:  0  Agitation:  0     CAM / Delirium:  Negative  Constipation:  Negative  Diarrhea:  Negative    Anxiety:  Is not nervous/anxious  Constipation:  No constipation    Bowel Management Plan (BMP):  No      Pain Assessment:  OME in 24 hours:  0  Location(s): none      Modified Yisel Scale:  0    ECOG Performance Status ndGndrndanddndend:nd nd2nd Living Arrangements:  Lives with spouse    Psychosocial/Cultural:   See Palliative Psychosocial Note: Yes  Please see SW note from 08/12/2024 for full details.    Patient enjoys painting/drawing and gardening. He has three children with his wife  **Primary  to Follow**  Palliative Care  Consult: No    Spiritual:  F - [place] and Belief:  Confucianism  I - Importance:  Moderate  C - Community:  None  A - Address in Care:  Needs met        Medications:    Current Outpatient Medications:     acyclovir (ZOVIRAX) 800 MG Tab, Take 1 tablet (800 mg total) by mouth 2 (two) times daily., Disp: 60 tablet, Rfl: 11    atovaquone (MEPRON) 750 mg/5 mL Susp oral liquid, Take 10 mLs (1,500 mg total) by mouth once daily., Disp: 210 mL, Rfl: 2    budesonide (ENTOCORT EC) 3 mg capsule, Take 1 capsule (3 mg total) by mouth 3 (three) times daily., Disp: 90 capsule, Rfl: 2    carvediloL (COREG) 3.125 MG tablet, Take 1 tablet (3.125 mg total) by mouth 2 (two) times daily., Disp: 60 tablet, Rfl: 11    cyanocobalamin 1,000 mcg/mL injection, Inject 1 mL (1,000 mcg total) into the muscle once a week., Disp: 10 mL, Rfl: 2    eltrombopag olamine (PROMACTA) 50 MG Tab, Take 1 tablet (50 mg total) by mouth once daily., Disp: 30 tablet, Rfl: 11    folic acid (FOLVITE) 1 MG tablet, Take 1 tablet (1 mg total) by mouth once daily., Disp: 100 tablet, Rfl: 3    gabapentin (NEURONTIN) 300 MG capsule, Take 2 capsules (600 mg total) by mouth nightly as needed (Restless leg)., Disp: 60 capsule, Rfl: 2     letermovir (PREVYMIS) 480 mg Tab, Take 1 tablet (480 mg total) by mouth Daily., Disp: 28 tablet, Rfl: 9    levoFLOXacin (LEVAQUIN) 500 MG tablet, Take 1 tablet (500 mg total) by mouth once daily., Disp: 30 tablet, Rfl: 5    loperamide (IMODIUM A-D) 2 mg Tab, Take 2 mg by mouth 4 (four) times daily as needed., Disp: , Rfl:     magnesium oxide (MAG-OX) 400 mg (241.3 mg magnesium) tablet, Take 2 tablets (800 mg total) by mouth 2 (two) times daily., Disp: 120 tablet, Rfl: 11    ondansetron (ZOFRAN-ODT) 8 MG TbDL, Take 1 tablet (8 mg total) by mouth every 8 (eight) hours as needed (nausea)., Disp: 30 tablet, Rfl: 1    pantoprazole (PROTONIX) 40 MG tablet, Take 1 tablet (40 mg total) by mouth once daily., Disp: 30 tablet, Rfl: 3    posaconazole (NOXAFIL) 100 mg TbEC tablet, Take 3 tablets (300 mg total) by mouth once daily., Disp: 90 tablet, Rfl: 11    rOPINIRole (REQUIP) 0.5 MG tablet, Take 1 tablet (0.5 mg total) by mouth every evening., Disp: 30 tablet, Rfl: 11    tacrolimus (PROGRAF) 0.5 MG Cap, Take 2 capsules (1 mg total) by mouth every morning AND 1 capsule (0.5 mg total) every evening., Disp: 90 capsule, Rfl: 5    zolpidem (AMBIEN) 5 MG Tab, Take 1 tablet (5 mg total) by mouth nightly as needed (insomnia)., Disp: 30 tablet, Rfl: 0  No current facility-administered medications for this visit.      External  database queried on 02/27/2025  by Elvira BARTLETT :  11/25/2024 Zolpidem tartrate 5 mg Disp: 30 for 30 days  11/08/2024 Zolpidem tartrate 5 mg Disp: 15 for 15 days       Review of patient's allergies indicates:  No Known Allergies        OBJECTIVE:         Physical Exam:  Vitals: Pulse: 88 (02/27/25 1107)  BP: 109/67 (02/27/25 1107)  SpO2: 98 % (02/27/25 1107)  Vitals:    02/27/25 1107   BP: 109/67   Pulse: 88     Physical Exam  Vitals reviewed.   Constitutional:       General: He is not in acute distress.     Appearance: He is not toxic-appearing or diaphoretic.   HENT:      Head: Normocephalic  "and atraumatic.      Right Ear: External ear normal.      Left Ear: External ear normal.   Eyes:      General: No scleral icterus.        Right eye: No discharge.         Left eye: No discharge.      Extraocular Movements: Extraocular movements intact.   Neck:      Comments: Trachea midline  Pulmonary:      Effort: Pulmonary effort is normal. No respiratory distress.   Abdominal:      General: Abdomen is flat. There is no distension.   Musculoskeletal:         General: No signs of injury.      Cervical back: Normal range of motion.      Right lower leg: No edema.      Left lower leg: No edema.   Skin:     General: Skin is dry.      Coloration: Skin is not jaundiced.      Findings: No rash.   Neurological:      General: No focal deficit present.      Mental Status: He is oriented to person, place, and time.      Cranial Nerves: No cranial nerve deficit.   Psychiatric:         Mood and Affect: Mood normal.         Behavior: Behavior normal.         Thought Content: Thought content normal.         Judgment: Judgment normal.           Labs: I have reviewed the latest labs on 2025 including CBC showing WBC: 2.94, H.2, Hct: 33.6, platelets: 40 as well as CMP showing Cr: 0.9, Alb: 3.7      Imaging: I have reviewed the latest imaging on 2024 CT C/A/P: "New left lower lobe consolidation and parapneumonic effusion concerning for acute infectious process. Trace pericholecystic fluid without significant gallbladder wall thickening or gallstones.  Finding may be related to edema, however cholecystitis could present similarly.  Recommend correlation for cholecystitis."    This note was generated with the assistance of ambient listening technology. Verbal consent was obtained by the patient and accompanying visitor(s) for the recording of patient appointment to facilitate this note. I attest to having reviewed and edited the generated note for accuracy, though some syntax or spelling errors may persist. Please " contact the author of this note for any clarification.    Elvira Maria MD

## 2025-02-28 ENCOUNTER — PATIENT MESSAGE (OUTPATIENT)
Dept: HEMATOLOGY/ONCOLOGY | Facility: CLINIC | Age: 70
End: 2025-02-28
Payer: MEDICARE

## 2025-03-03 ENCOUNTER — CLINICAL SUPPORT (OUTPATIENT)
Dept: HEMATOLOGY/ONCOLOGY | Facility: CLINIC | Age: 70
End: 2025-03-03
Payer: MEDICARE

## 2025-03-03 ENCOUNTER — LAB VISIT (OUTPATIENT)
Dept: LAB | Facility: HOSPITAL | Age: 70
End: 2025-03-03
Attending: FAMILY MEDICINE
Payer: MEDICARE

## 2025-03-03 VITALS
HEIGHT: 69 IN | DIASTOLIC BLOOD PRESSURE: 72 MMHG | OXYGEN SATURATION: 100 % | HEART RATE: 74 BPM | WEIGHT: 141.63 LBS | BODY MASS INDEX: 20.98 KG/M2 | SYSTOLIC BLOOD PRESSURE: 145 MMHG

## 2025-03-03 DIAGNOSIS — Z94.84 IMMUNOCOMPROMISED STATE ASSOCIATED WITH STEM CELL TRANSPLANT: ICD-10-CM

## 2025-03-03 DIAGNOSIS — D46.9 MYELODYSPLASTIC SYNDROME: ICD-10-CM

## 2025-03-03 DIAGNOSIS — D84.822 IMMUNOCOMPROMISED STATE ASSOCIATED WITH STEM CELL TRANSPLANT: ICD-10-CM

## 2025-03-03 DIAGNOSIS — E53.8 B12 DEFICIENCY: Primary | ICD-10-CM

## 2025-03-03 DIAGNOSIS — Z76.82 STEM CELL TRANSPLANT CANDIDATE: ICD-10-CM

## 2025-03-03 LAB
ABO GROUP BLD: NORMAL
ALBUMIN SERPL BCP-MCNC: 3.4 G/DL (ref 3.5–5.2)
ALP SERPL-CCNC: 86 U/L (ref 40–150)
ALT SERPL W/O P-5'-P-CCNC: 36 U/L (ref 10–44)
ANION GAP SERPL CALC-SCNC: 10 MMOL/L (ref 8–16)
ANISOCYTOSIS BLD QL SMEAR: SLIGHT
AST SERPL-CCNC: 26 U/L (ref 10–40)
BASOPHILS NFR BLD: 0 % (ref 0–1.9)
BILIRUB SERPL-MCNC: 0.5 MG/DL (ref 0.1–1)
BLD GP AB SCN CELLS X3 SERPL QL: NORMAL
BUN SERPL-MCNC: 22 MG/DL (ref 8–23)
CALCIUM SERPL-MCNC: 8.7 MG/DL (ref 8.7–10.5)
CHLORIDE SERPL-SCNC: 108 MMOL/L (ref 95–110)
CO2 SERPL-SCNC: 25 MMOL/L (ref 23–29)
CREAT SERPL-MCNC: 0.9 MG/DL (ref 0.5–1.4)
DIFFERENTIAL METHOD BLD: ABNORMAL
EOSINOPHIL NFR BLD: 0 % (ref 0–8)
ERYTHROCYTE [DISTWIDTH] IN BLOOD BY AUTOMATED COUNT: 17.6 % (ref 11.5–14.5)
EST. GFR  (NO RACE VARIABLE): >60 ML/MIN/1.73 M^2
GLUCOSE SERPL-MCNC: 153 MG/DL (ref 70–110)
HCT VFR BLD AUTO: 30.7 % (ref 40–54)
HGB BLD-MCNC: 10.1 G/DL (ref 14–18)
IMM GRANULOCYTES # BLD AUTO: ABNORMAL K/UL (ref 0–0.04)
IMM GRANULOCYTES NFR BLD AUTO: ABNORMAL % (ref 0–0.5)
LDH SERPL L TO P-CCNC: 279 U/L (ref 110–260)
LYMPHOCYTES NFR BLD: 16 % (ref 18–48)
MAGNESIUM SERPL-MCNC: 1.4 MG/DL (ref 1.6–2.6)
MCH RBC QN AUTO: 39.6 PG (ref 27–31)
MCHC RBC AUTO-ENTMCNC: 32.9 G/DL (ref 32–36)
MCV RBC AUTO: 120 FL (ref 82–98)
MONOCYTES NFR BLD: 9 % (ref 4–15)
NEUTROPHILS NFR BLD: 75 % (ref 38–73)
NRBC BLD-RTO: 0 /100 WBC
PHOSPHATE SERPL-MCNC: 2.9 MG/DL (ref 2.7–4.5)
PLATELET # BLD AUTO: 37 K/UL (ref 150–450)
PLATELET BLD QL SMEAR: ABNORMAL
PMV BLD AUTO: 12 FL (ref 9.2–12.9)
POLYCHROMASIA BLD QL SMEAR: ABNORMAL
POTASSIUM SERPL-SCNC: 4.4 MMOL/L (ref 3.5–5.1)
PROT SERPL-MCNC: 5.5 G/DL (ref 6–8.4)
RBC # BLD AUTO: 2.55 M/UL (ref 4.6–6.2)
RH BLD: NORMAL
SODIUM SERPL-SCNC: 143 MMOL/L (ref 136–145)
SPECIMEN OUTDATE: NORMAL
URATE SERPL-MCNC: 3.5 MG/DL (ref 3.4–7)
WBC # BLD AUTO: 2.02 K/UL (ref 3.9–12.7)

## 2025-03-03 PROCEDURE — 80197 ASSAY OF TACROLIMUS: CPT | Performed by: INTERNAL MEDICINE

## 2025-03-03 PROCEDURE — 99999 PR PBB SHADOW E&M-EST. PATIENT-LVL II: CPT | Mod: PBBFAC,,,

## 2025-03-03 PROCEDURE — 85027 COMPLETE CBC AUTOMATED: CPT | Performed by: INTERNAL MEDICINE

## 2025-03-03 PROCEDURE — 80053 COMPREHEN METABOLIC PANEL: CPT | Performed by: INTERNAL MEDICINE

## 2025-03-03 PROCEDURE — 83615 LACTATE (LD) (LDH) ENZYME: CPT | Performed by: INTERNAL MEDICINE

## 2025-03-03 PROCEDURE — 87799 DETECT AGENT NOS DNA QUANT: CPT | Performed by: INTERNAL MEDICINE

## 2025-03-03 PROCEDURE — 85007 BL SMEAR W/DIFF WBC COUNT: CPT | Performed by: INTERNAL MEDICINE

## 2025-03-03 PROCEDURE — 84100 ASSAY OF PHOSPHORUS: CPT | Performed by: INTERNAL MEDICINE

## 2025-03-03 PROCEDURE — 86901 BLOOD TYPING SEROLOGIC RH(D): CPT | Performed by: INTERNAL MEDICINE

## 2025-03-03 PROCEDURE — 84550 ASSAY OF BLOOD/URIC ACID: CPT | Performed by: INTERNAL MEDICINE

## 2025-03-03 PROCEDURE — 36415 COLL VENOUS BLD VENIPUNCTURE: CPT | Performed by: INTERNAL MEDICINE

## 2025-03-03 PROCEDURE — 86900 BLOOD TYPING SEROLOGIC ABO: CPT | Performed by: INTERNAL MEDICINE

## 2025-03-03 PROCEDURE — 86850 RBC ANTIBODY SCREEN: CPT | Performed by: INTERNAL MEDICINE

## 2025-03-03 PROCEDURE — 83735 ASSAY OF MAGNESIUM: CPT | Performed by: INTERNAL MEDICINE

## 2025-03-03 RX ORDER — CYANOCOBALAMIN 1000 UG/ML
1000 INJECTION, SOLUTION INTRAMUSCULAR; SUBCUTANEOUS
Status: COMPLETED | OUTPATIENT
Start: 2025-03-03 | End: 2025-03-03

## 2025-03-03 RX ORDER — CYANOCOBALAMIN 1000 UG/ML
1000 INJECTION, SOLUTION INTRAMUSCULAR; SUBCUTANEOUS ONCE
Status: CANCELLED | OUTPATIENT
Start: 2025-03-03

## 2025-03-03 RX ADMIN — CYANOCOBALAMIN 1000 MCG: 1000 INJECTION, SOLUTION INTRAMUSCULAR; SUBCUTANEOUS at 01:03

## 2025-03-04 LAB — TACROLIMUS BLD-MCNC: 7.5 NG/ML (ref 5–15)

## 2025-03-05 ENCOUNTER — DOCUMENTATION ONLY (OUTPATIENT)
Dept: PHARMACY | Facility: HOSPITAL | Age: 70
End: 2025-03-05
Payer: MEDICARE

## 2025-03-05 ENCOUNTER — CLINICAL SUPPORT (OUTPATIENT)
Dept: REHABILITATION | Facility: HOSPITAL | Age: 70
End: 2025-03-05
Payer: MEDICARE

## 2025-03-05 DIAGNOSIS — Z74.09 DECREASED STRENGTH, ENDURANCE, AND MOBILITY: Primary | ICD-10-CM

## 2025-03-05 DIAGNOSIS — R68.89 DECREASED STRENGTH, ENDURANCE, AND MOBILITY: Primary | ICD-10-CM

## 2025-03-05 DIAGNOSIS — R53.1 DECREASED STRENGTH, ENDURANCE, AND MOBILITY: Primary | ICD-10-CM

## 2025-03-05 PROCEDURE — 97530 THERAPEUTIC ACTIVITIES: CPT | Mod: PN

## 2025-03-05 PROCEDURE — 97112 NEUROMUSCULAR REEDUCATION: CPT | Mod: PN

## 2025-03-05 NOTE — PROGRESS NOTES
[facility]   Physical Therapy Treatment Note      Name: Guillaume Salinas  Clinic Number: [id number]    Therapy Diagnosis:   Encounter Diagnosis   Name Primary?    Decreased strength, endurance, and mobility Yes       Physician: Mohit Hickey MD    Visit Date: 3/5/2025    Physician Orders: PT Eval and Treat   Medical Diagnosis from Referral: Z94.81 (ICD-10-CM) - S/P allogeneic bone marrow transplant   Evaluation Date: 2024  Authorization Period Expiration: 2025   Plan of Care Expiration: 3/20/25  Progress Note Due: 3/20/25  Date of Surgery: 14  Visit # / Visits authorized: 5/ 10 +eval  FOTO: 1/ 3     Precautions: Standard and HTN,Myelodysplastic syndrome, PVD, CAD, chemotherapy        Time In: 2:00 PM   Time Out: 2:58 PM  Total Billable Time: 58 minutes    PTA Visit #: 0/     Subjective     Patient reports: He is feeling much better and is not having any pain. He wants to work some more on his arms next time because he feels like he has no strength in his arms.   He was not compliant with home exercise program.  Response to previous treatment: very good, a little tired  Functional change: ongoing    Pain: 0/10  Location: bilateral legs     Objective    25    30 second sit to stand: 11 reps without use of hands  6 Minute walk test: 1342 ft     Treatment     Guillaume received the treatments listed below:      neuromuscular re-education activities to improve: Balance, Coordination, Proprioception, and Posture for 15 minutes. The following activities were included:  Side lying clams 3x10 ea   Supine Straight leg raise 3x10 ea  Bridges 3x10  Short arc quad 3lb 3x10 ea    therapeutic activities to improve functional performance for 43 minutes, includin minute walk around clinic (beginning of session)  Nustep x 8 minutes (end of session)  Seated march x30 3lb  Seated long arc quad 3lb x30ea  Seated hamstring curl green theraband 3x10 ea   Standing hip  abduction 3x10 ea  Standing heel raises 3x10   Supine active range of motion shoulder flexion 3lb wand x30   Supine chest press 3lb wand 3x10  Bicep curl 3lb 3x10 Left   Sit to stand from raised mat x10 (only performed 8 before needing to stop)    Patient Education and Home Exercises       Education provided:   - updated Home exercise program     Written Home Exercises Provided: Pt instructed to continue prior HEP. Exercises were reviewed and Guillaume was able to demonstrate them prior to the end of the session.  Guillaume demonstrated good  understanding of the education provided. See Electronic Medical Record under Patient Instructions for exercises provided during therapy sessions    Assessment     Mr. Galaviz requested some exercises to also assist with strengthening his arms so therefore upper extremity exercises were added today. He was able to complete with minor reports of discomfort in left upper extremity. He was fatigued with sit to stand today and could not perform all reps. PT to continue to progress with global strengthening to improve overall strength and quality of life.   Guillaume Is progressing well towards his goals.   Patient prognosis is Fair.     Patient will continue to benefit from skilled outpatient physical therapy to address the deficits listed in the problem list box on initial evaluation, provide pt/family education and to maximize pt's level of independence in the home and community environment.     Patient's spiritual, cultural and educational needs considered and pt agreeable to plan of care and goals.     Anticipated barriers to physical therapy: co-morbidities    Goals: Short Term Goals: 6 weeks   Patient will be independent with Home exercise program to supplement therapy progressing, not met   Patient will be able to ambulate 1300 ft with 6 Minute walk test to improve endurance for community mobility  met  Patient will be able to lift and carry groceries without difficulty to improve  ability to perform functional tasks  met     Long Term Goals: 12 weeks   Patient will be able to ambulate 1400 ft or more with 6 Minute walk test to improve endurance for community mobility progressing, not met  Patient will be able to perform 16 sit to stand on 30 second sit to  order to improve functional strength and endurance for transfers progressing, not met  Patient will improve FOTO score to 65 % to improve self perceived ability to perform functional tasks progressing, not met  Patient goal: Patient will be able to mow his lawn to return to prior level of function progressing, not met    Plan     Improve functional strength and endurance    Elvira Joyce, PT ,DPT,OCS    03/05/2025

## 2025-03-05 NOTE — PROGRESS NOTES
BMT Pharmacist Immunosuppression Note:    Reviewed patient's tacrolimus level with Dr. Hickey and it is within goal range. Instructed patient to continue your current regimen of 2 capsules (1mg) in the morning and 1 capsules (0.5mg) in the evening.       Sanjuana Poe Pharm.D., [facility]  Clinical Pharmacy Specialist, Bone Marrow Transplant/Cellular Therapy  [facility]  SpectraLink: [id number]

## 2025-03-06 DIAGNOSIS — Z94.84 IMMUNOCOMPROMISED STATE ASSOCIATED WITH STEM CELL TRANSPLANT: ICD-10-CM

## 2025-03-06 DIAGNOSIS — D84.822 IMMUNOCOMPROMISED STATE ASSOCIATED WITH STEM CELL TRANSPLANT: ICD-10-CM

## 2025-03-06 RX ORDER — POSACONAZOLE 100 MG/1
300 TABLET, DELAYED RELEASE ORAL DAILY
Qty: 90 TABLET | Refills: 11 | Status: CANCELLED | OUTPATIENT
Start: 2025-03-06

## 2025-03-07 ENCOUNTER — TELEPHONE (OUTPATIENT)
Dept: HEMATOLOGY/ONCOLOGY | Facility: CLINIC | Age: 70
End: 2025-03-07
Payer: MEDICARE

## 2025-03-07 ENCOUNTER — PATIENT MESSAGE (OUTPATIENT)
Dept: HEMATOLOGY/ONCOLOGY | Facility: CLINIC | Age: 70
End: 2025-03-07
Payer: MEDICARE

## 2025-03-07 DIAGNOSIS — Z94.84 IMMUNOCOMPROMISED STATE ASSOCIATED WITH STEM CELL TRANSPLANT: ICD-10-CM

## 2025-03-07 DIAGNOSIS — D84.822 IMMUNOCOMPROMISED STATE ASSOCIATED WITH STEM CELL TRANSPLANT: ICD-10-CM

## 2025-03-07 RX ORDER — POSACONAZOLE 100 MG/1
300 TABLET, DELAYED RELEASE ORAL DAILY
Qty: 90 TABLET | Refills: 11 | Status: SHIPPED | OUTPATIENT
Start: 2025-03-07

## 2025-03-07 NOTE — TELEPHONE ENCOUNTER
----- Message from Evelyne sent at 3/7/2025  9:53 AM CST -----  Regarding: Patient advice  Contact: Lazaro [phone]  Name of Caller: Lazaro [facility] Preference: [phone] call from.: Rupal BUSTILLO Nature of Call:   Requesting a call back to get clarification on why office notes were sent to them states second request trying to avoid delay in pt's care

## 2025-03-10 ENCOUNTER — CLINICAL SUPPORT (OUTPATIENT)
Dept: HEMATOLOGY/ONCOLOGY | Facility: CLINIC | Age: 70
End: 2025-03-10
Payer: MEDICARE

## 2025-03-10 ENCOUNTER — LAB VISIT (OUTPATIENT)
Dept: LAB | Facility: HOSPITAL | Age: 70
End: 2025-03-10
Attending: FAMILY MEDICINE
Payer: MEDICARE

## 2025-03-10 DIAGNOSIS — E53.8 B12 DEFICIENCY: Primary | ICD-10-CM

## 2025-03-10 DIAGNOSIS — D46.9 MYELODYSPLASTIC SYNDROME: ICD-10-CM

## 2025-03-10 DIAGNOSIS — Z76.82 STEM CELL TRANSPLANT CANDIDATE: ICD-10-CM

## 2025-03-10 LAB
ABO + RH BLD: NORMAL
ALBUMIN SERPL BCP-MCNC: 3.2 G/DL (ref 3.5–5.2)
ALP SERPL-CCNC: 81 U/L (ref 40–150)
ALT SERPL W/O P-5'-P-CCNC: 31 U/L (ref 10–44)
ANION GAP SERPL CALC-SCNC: 11 MMOL/L (ref 8–16)
ANISOCYTOSIS BLD QL SMEAR: SLIGHT
AST SERPL-CCNC: 23 U/L (ref 10–40)
BASOPHILS NFR BLD: 0 % (ref 0–1.9)
BILIRUB SERPL-MCNC: 0.4 MG/DL (ref 0.1–1)
BLD GP AB SCN CELLS X3 SERPL QL: NORMAL
BUN SERPL-MCNC: 19 MG/DL (ref 8–23)
CALCIUM SERPL-MCNC: 8.9 MG/DL (ref 8.7–10.5)
CHLORIDE SERPL-SCNC: 108 MMOL/L (ref 95–110)
CO2 SERPL-SCNC: 25 MMOL/L (ref 23–29)
CREAT SERPL-MCNC: 0.8 MG/DL (ref 0.5–1.4)
DIFFERENTIAL METHOD BLD: ABNORMAL
EOSINOPHIL NFR BLD: 2 % (ref 0–8)
ERYTHROCYTE [DISTWIDTH] IN BLOOD BY AUTOMATED COUNT: 17.2 % (ref 11.5–14.5)
EST. GFR  (NO RACE VARIABLE): >60 ML/MIN/1.73 M^2
GLUCOSE SERPL-MCNC: 106 MG/DL (ref 70–110)
HCT VFR BLD AUTO: 30.8 % (ref 40–54)
HGB BLD-MCNC: 10.1 G/DL (ref 14–18)
IMM GRANULOCYTES # BLD AUTO: ABNORMAL K/UL (ref 0–0.04)
IMM GRANULOCYTES NFR BLD AUTO: ABNORMAL % (ref 0–0.5)
LDH SERPL L TO P-CCNC: 225 U/L (ref 110–260)
LYMPHOCYTES NFR BLD: 24 % (ref 18–48)
MAGNESIUM SERPL-MCNC: 1.6 MG/DL (ref 1.6–2.6)
MCH RBC QN AUTO: 39.3 PG (ref 27–31)
MCHC RBC AUTO-ENTMCNC: 32.8 G/DL (ref 32–36)
MCV RBC AUTO: 120 FL (ref 82–98)
METAMYELOCYTES NFR BLD MANUAL: 2 %
MONOCYTES NFR BLD: 8 % (ref 4–15)
NEUTROPHILS NFR BLD: 60 % (ref 38–73)
NEUTS BAND NFR BLD MANUAL: 4 %
NRBC BLD-RTO: 0 /100 WBC
PHOSPHATE SERPL-MCNC: 3.9 MG/DL (ref 2.7–4.5)
PLATELET # BLD AUTO: 41 K/UL (ref 150–450)
PLATELET BLD QL SMEAR: ABNORMAL
PMV BLD AUTO: 12.2 FL (ref 9.2–12.9)
POLYCHROMASIA BLD QL SMEAR: ABNORMAL
POTASSIUM SERPL-SCNC: 4.2 MMOL/L (ref 3.5–5.1)
PROT SERPL-MCNC: 5.4 G/DL (ref 6–8.4)
RBC # BLD AUTO: 2.57 M/UL (ref 4.6–6.2)
SODIUM SERPL-SCNC: 144 MMOL/L (ref 136–145)
SPECIMEN OUTDATE: NORMAL
URATE SERPL-MCNC: 3.7 MG/DL (ref 3.4–7)
WBC # BLD AUTO: 2.03 K/UL (ref 3.9–12.7)

## 2025-03-10 PROCEDURE — 80053 COMPREHEN METABOLIC PANEL: CPT | Performed by: INTERNAL MEDICINE

## 2025-03-10 PROCEDURE — 86901 BLOOD TYPING SEROLOGIC RH(D): CPT | Performed by: INTERNAL MEDICINE

## 2025-03-10 PROCEDURE — 85007 BL SMEAR W/DIFF WBC COUNT: CPT | Performed by: INTERNAL MEDICINE

## 2025-03-10 PROCEDURE — 80197 ASSAY OF TACROLIMUS: CPT | Performed by: INTERNAL MEDICINE

## 2025-03-10 PROCEDURE — 85027 COMPLETE CBC AUTOMATED: CPT | Performed by: INTERNAL MEDICINE

## 2025-03-10 PROCEDURE — 83735 ASSAY OF MAGNESIUM: CPT | Performed by: INTERNAL MEDICINE

## 2025-03-10 PROCEDURE — 84550 ASSAY OF BLOOD/URIC ACID: CPT | Performed by: INTERNAL MEDICINE

## 2025-03-10 PROCEDURE — 87799 DETECT AGENT NOS DNA QUANT: CPT | Performed by: INTERNAL MEDICINE

## 2025-03-10 PROCEDURE — 36415 COLL VENOUS BLD VENIPUNCTURE: CPT | Performed by: INTERNAL MEDICINE

## 2025-03-10 PROCEDURE — 83615 LACTATE (LD) (LDH) ENZYME: CPT | Performed by: INTERNAL MEDICINE

## 2025-03-10 PROCEDURE — 84100 ASSAY OF PHOSPHORUS: CPT | Performed by: INTERNAL MEDICINE

## 2025-03-10 RX ORDER — CYANOCOBALAMIN 1000 UG/ML
1000 INJECTION, SOLUTION INTRAMUSCULAR; SUBCUTANEOUS
Qty: 1 ML | Refills: 0 | Status: CANCELLED | OUTPATIENT
Start: 2025-03-10

## 2025-03-10 RX ORDER — CYANOCOBALAMIN 1000 UG/ML
1000 INJECTION, SOLUTION INTRAMUSCULAR; SUBCUTANEOUS
Status: COMPLETED | OUTPATIENT
Start: 2025-03-10 | End: 2025-03-10

## 2025-03-10 RX ADMIN — CYANOCOBALAMIN 1000 MCG: 1000 INJECTION, SOLUTION INTRAMUSCULAR; SUBCUTANEOUS at 02:03

## 2025-03-11 ENCOUNTER — CLINICAL SUPPORT (OUTPATIENT)
Dept: REHABILITATION | Facility: HOSPITAL | Age: 70
End: 2025-03-11
Payer: MEDICARE

## 2025-03-11 DIAGNOSIS — R53.1 DECREASED STRENGTH, ENDURANCE, AND MOBILITY: Primary | ICD-10-CM

## 2025-03-11 DIAGNOSIS — R68.89 DECREASED STRENGTH, ENDURANCE, AND MOBILITY: Primary | ICD-10-CM

## 2025-03-11 DIAGNOSIS — Z74.09 DECREASED STRENGTH, ENDURANCE, AND MOBILITY: Primary | ICD-10-CM

## 2025-03-11 LAB
CMV DNA SPEC QL NAA+PROBE: NORMAL
CYTOMEGALOVIRUS PCR, QUANT: NOT DETECTED IU/ML
EPSTEIN-BARR VIRUS DNA: NORMAL
EPSTEIN-BARR VIRUS PCR, QUANT: NOT DETECTED IU/ML
TACROLIMUS BLD-MCNC: 9.3 NG/ML (ref 5–15)

## 2025-03-11 PROCEDURE — 97112 NEUROMUSCULAR REEDUCATION: CPT | Mod: PN,CQ

## 2025-03-11 PROCEDURE — 97530 THERAPEUTIC ACTIVITIES: CPT | Mod: PN,CQ

## 2025-03-11 RX ORDER — POSACONAZOLE 100 MG/1
300 TABLET, DELAYED RELEASE ORAL DAILY
Qty: 90 TABLET | Refills: 11 | Status: SHIPPED | OUTPATIENT
Start: 2025-03-11

## 2025-03-11 NOTE — PROGRESS NOTES
[facility]   Physical Therapy Treatment Note      Name: Guillaume Salinas  Clinic Number: [id number]    Therapy Diagnosis:   Encounter Diagnosis   Name Primary?    Decreased strength, endurance, and mobility Yes     Physician: Mohit Hickey MD    Visit Date: 3/11/2025    Physician Orders: PT Eval and Treat   Medical Diagnosis from Referral: Z94.81 (ICD-10-CM) - S/P allogeneic bone marrow transplant   Evaluation Date: 2024  Authorization Period Expiration: 2025   Plan of Care Expiration: 3/20/25  Progress Note Due: 3/20/25  Date of Surgery: 14  Visit # / Visits authorized: 5/ 10 +eval  FOTO: 1/ 3     Precautions: Standard and HTN,Myelodysplastic syndrome, PVD, CAD, chemotherapy        Time In: 3:00 PM   Time Out: 3:55 PM  Total Billable Time: 55 minutes    PTA Visit #:      Subjective     Patient reports: agreeable to PT session   He was not compliant with home exercise program.  Response to previous treatment: very good, a little tired  Functional change: ongoing    Pain: 0/10  Location: bilateral legs     Objective    25    30 second sit to stand: 11 reps without use of hands  6 Minute walk test: 1342 ft     Treatment     Guillaume received the treatments listed below:      neuromuscular re-education activities to improve: Balance, Coordination, Proprioception, and Posture for 15 minutes. The following activities were included:  Side lying clams 3x10 ea   Supine Straight leg raise 3x10 ea  Bridges 3x10  Short arc quad 3lb 3x10 ea    therapeutic activities to improve functional performance for 40 minutes, includin minute walk around clinic (beginning of session)  Nustep x 8 minutes (end of session)  Seated march x30 3lb  Seated long arc quad 3lb x30ea  Seated hamstring curl green theraband 3x10 ea   Standing hip abduction 3x10 ea  Standing heel raises 3x10   Supine active range of motion shoulder flexion 3lb wand x30   Supine chest press 3lb wand  3x10  Bicep curl 3lb 3x10 Left   Sit to stand from raised mat x10 (only performed 8 before needing to stop)    Patient Education and Home Exercises       Education provided:   - updated Home exercise program     Written Home Exercises Provided: Pt instructed to continue prior HEP. Exercises were reviewed and Guillaume was able to demonstrate them prior to the end of the session.  Guillaume demonstrated good  understanding of the education provided. See Electronic Medical Record under Patient Instructions for exercises provided during therapy sessions    Assessment     Mr. Galaviz arrived to session with no new reports of pain. Rest breaks granted throughout session for general fatigue recovery (mostly with standing activities). Pt arrived in good spirits and is cooperative throughout treatment.   Guillaume Is progressing well towards his goals.   Patient prognosis is Fair.     Patient will continue to benefit from skilled outpatient physical therapy to address the deficits listed in the problem list box on initial evaluation, provide pt/family education and to maximize pt's level of independence in the home and community environment.     Patient's spiritual, cultural and educational needs considered and pt agreeable to plan of care and goals.     Anticipated barriers to physical therapy: co-morbidities    Goals: Short Term Goals: 6 weeks   Patient will be independent with Home exercise program to supplement therapy progressing, not met   Patient will be able to ambulate 1300 ft with 6 Minute walk test to improve endurance for community mobility  met  Patient will be able to lift and carry groceries without difficulty to improve ability to perform functional tasks  met     Long Term Goals: 12 weeks   Patient will be able to ambulate 1400 ft or more with 6 Minute walk test to improve endurance for community mobility progressing, not met  Patient will be able to perform 16 sit to stand on 30 second sit to  order to  improve functional strength and endurance for transfers progressing, not met  Patient will improve FOTO score to 65 % to improve self perceived ability to perform functional tasks progressing, not met  Patient goal: Patient will be able to mow his lawn to return to prior level of function progressing, not met    Plan     Improve functional strength and endurance    Julian Morrow, PTA     03/17/2025

## 2025-03-11 NOTE — PROGRESS NOTES
BMT Pharmacist Immunosuppression Note:    Reviewed patient's tacrolimus level with Dr. Hickey and it is within goal range. Instructed patient to continue your current regimen of 2 capsules (1mg) in the morning and 1 capsules (0.5mg) in the evening.       Sanjuana Poe Pharm.D., [facility]  Clinical Pharmacy Specialist, Bone Marrow Transplant/Cellular Therapy  [facility]  SpectraLink: [phone]

## 2025-03-13 NOTE — PROGRESS NOTES
Section of Hematology and Stem Cell Transplantation    Post-Transplantation Follow Up Visit       Notes:    03/14/2025    Transplant History:   Primary oncologist: Paco Hickey MD  Primary oncologic diagnosis: MDS  Transplant date: 9/19/2024  Donor: haploidentical  Blood Type (Patient): B +  Blood Type (Donor): A +  CMV (Patient): Positive  CMV (Donor): Positive  Graft source: Bone marrow  CD34+ cell dose: 3.55x10^6  Conditioning Regimen: Fludarabine plus melphalan 100 mg/m2 + 2Gy TBI  GVHD prophylaxis: Post-transplant cyclophosphamide, Tacrolimus, MMF  Immediate post-transplant complications: Patient experienced expected GI toxicities with C diff negative diarrhea and nausea, neutropenic fever with negative infectious work up, expected cytopenias requiring transfusions, electrolyte abnormalities requiring replacement, volume overload requiring diuresis, intermittent SOB from pulmonary edema requiring 02, and a hemorrhoid flare up. Diarrhea and SOB improved towards the end of the hospital stay.     History of Present Ilness:   Guillaume Salinas (Guillaume) is a pleasant 69 y.o.male with a past medical history of MDS who is status post haploidentical stem cell transplantation conditioned with FluMel 100 + PTCy+ 2Gy TBI who is currently day +176  who presents for post-transplant follow up.  services used today.     Interval History:   Patient presents for routine follow-up post allogeneic transplant.  He is doing well overall except for some headaches on and off. He describes it as being across his forehead. His face appears red today and is itching. He and his wife are not sure when it started. He denies being the sun, changes in face products, or new foods. His nausea is gone and he is now off of the budesonide. He has been eating and drinking very well. His energy is doing great. No diarrhea. His blood pressure at home has been good and he is normotensive in clinic today. He continues to work with  PT.        PAST MEDICAL HISTORY:   Past Medical History:   Diagnosis Date    Anticoagulant long-term use     Coronary artery disease     Hypertension     Myelodysplastic syndrome     Peripheral vascular disease, unspecified        PAST SURGICAL HISTORY:   Past Surgical History:   Procedure Laterality Date    BONE MARROW BIOPSY Left 2023    Procedure: Biopsy-bone marrow;  Surgeon: Harry Diamond MD;  Location: [facility] OR;  Service: Oncology;  Laterality: Left;    COLONOSCOPY N/A 2022    Procedure: COLONOSCOPY Golytely Vaccinated will bring cards;  Surgeon: Dereje Simon MD;  Location: [facility] ENDO;  Service: Endoscopy;  Laterality: N/A;  Do not cancel this order    INSERTION OF LEMONS CATHETER Right 2024    Procedure: INSERTION, CATHETER, CENTRAL VENOUS, LEMONS TRIPLE LUMEN;  Surgeon: Kg Patten MD;  Location: [address];  Service: General;  Laterality: Right;     PAST SOCIAL HISTORY:  Social History     Socioeconomic History    Marital status:    Tobacco Use    Smoking status: Former     Current packs/day: 0.00     Average packs/day: 0.3 packs/day for 50.0 years (12.5 ttl pk-yrs)     Types: Cigarettes     Start date: 3/1/1973     Quit date: 3/1/2023     Years since quittin.0     Passive exposure: Past    Smokeless tobacco: Never   Substance and Sexual Activity    Alcohol use: Not Currently    Drug use: Never    Sexual activity: Not Currently     Partners: Female     Social Drivers of Health     Financial Resource Strain: Patient Declined (2024)    Overall Financial Resource Strain (CARDIA)     Difficulty of Paying Living Expenses: Patient declined   Food Insecurity: Patient Declined (2024)    Hunger Vital Sign     Worried About Running Out of Food in the Last Year: Patient declined     Ran Out of Food in the Last Year: Patient declined   Transportation Needs: Patient Declined (2024)    TRANSPORTATION NEEDS     Transportation : Patient declined    Physical Activity: Inactive (1/10/2025)    Exercise Vital Sign     Days of Exercise per Week: 0 days     Minutes of Exercise per Session: 10 min   Stress: Patient Declined (12/28/2024)    [employer] [place] [employer] - Occupational Stress Questionnaire     Feeling of Stress : Patient declined   Recent Concern: Stress - Stress Concern Present (9/30/2024)    [employer] [place] [employer] - Occupational Stress Questionnaire     Feeling of Stress : To some extent   Housing Stability: Patient Declined (12/28/2024)    Housing Stability Vital Sign     Unable to Pay for Housing in the Last Year: Patient declined     Homeless in the Last Year: Patient declined       FAMILY HISTORY:  Cancer-related family history includes Cancer in his brother and father.    CURRENT MEDICATIONS:   Medication List with Changes/Refills   New Medications    HYDROCORTISONE 1 % CREAM    Apply to affected area 2 times daily   Current Medications    ACYCLOVIR (ZOVIRAX) 800 MG TAB    Take 1 tablet (800 mg total) by mouth 2 (two) times daily.    ATOVAQUONE (MEPRON) 750 MG/5 ML SUSP ORAL LIQUID    Take 10 mLs (1,500 mg total) by mouth once daily.    BUDESONIDE (ENTOCORT EC) 3 MG CAPSULE    Take 1 capsule (3 mg total) by mouth 3 (three) times daily.    CARVEDILOL (COREG) 3.125 MG TABLET    Take 1 tablet (3.125 mg total) by mouth 2 (two) times daily.    CYANOCOBALAMIN 1,000 MCG/ML INJECTION    Inject 1 mL (1,000 mcg total) into the muscle once a week.    ELTROMBOPAG OLAMINE (PROMACTA) 50 MG TAB    Take 1 tablet (50 mg total) by mouth once daily.    FOLIC ACID (FOLVITE) 1 MG TABLET    Take 1 tablet (1 mg total) by mouth once daily.    GABAPENTIN (NEURONTIN) 300 MG CAPSULE    Take 2 capsules (600 mg total) by mouth nightly as needed (Restless leg).    LETERMOVIR (PREVYMIS) 480 MG TAB    Take 1 tablet (480 mg total) by mouth Daily.    LEVOFLOXACIN (LEVAQUIN) 500 MG TABLET    Take 1 tablet (500 mg total) by mouth once daily.     LOPERAMIDE (IMODIUM A-D) 2 MG TAB    Take 2 mg by mouth 4 (four) times daily as needed.    MAGNESIUM OXIDE (MAG-OX) 400 MG (241.3 MG MAGNESIUM) TABLET    Take 2 tablets (800 mg total) by mouth 2 (two) times daily.    ONDANSETRON (ZOFRAN-ODT) 8 MG TBDL    Take 1 tablet (8 mg total) by mouth every 8 (eight) hours as needed (nausea).    PANTOPRAZOLE (PROTONIX) 40 MG TABLET    Take 1 tablet (40 mg total) by mouth once daily.    POSACONAZOLE (NOXAFIL) 100 MG TBEC TABLET    Take 3 tablets (300 mg total) by mouth once daily.    POSACONAZOLE (NOXAFIL) 100 MG TBEC TABLET    Take 3 tablets (300 mg total) by mouth once daily.    ROPINIROLE (REQUIP) 0.5 MG TABLET    Take 1 tablet (0.5 mg total) by mouth every evening.    TACROLIMUS (PROGRAF) 0.5 MG CAP    Take 2 capsules (1 mg total) by mouth every morning AND 1 capsule (0.5 mg total) every evening.    ZOLPIDEM (AMBIEN) 5 MG TAB    Take 1 tablet (5 mg total) by mouth nightly as needed (insomnia).       ALLERGIES:   Review of patient's allergies indicates:  No Known Allergies    GVHD Review of Systems:     Pertinent positives and negatives included in the HPI. Otherwise a 14 point review of systems is negative. GVHD review of systems recorded in BMT flowsheet.     Physical Exam:     Vitals:    03/14/25 0808   BP: 121/66   Pulse: 91   Resp: 17   Temp: 98.5 °F (36.9 °C)       General: Appears well, NAD.   HEENT: MMM, no OP lesions  Pulmonary: CTAB, no increased work of breathing, no W/R/C  Cardiovascular: S1S2 normal, RRR, no M/R/G  Abdominal: Soft, NT, ND, BS+, no HSM  Extremities: No C/C/E  Neurological: AAOx4, grossly normal, no focal deficits  Dermatologic: Facial erythema and inflammation with sandpaper fill consistent with acute GVHD    ECOG Performance Status: (foot note - ECOG PS provided by Eastern Cooperative Oncology Group) 1 - Symptomatic but completely ambulatory    Karnofsky Performance Score:  80%- Normal Activity with Effort: Some Symptoms of Disease    Labs:   Lab  Results   Component Value Date    WBC 2.03 (L) 03/10/2025    HGB 10.1 (L) 03/10/2025    HCT 30.8 (L) 03/10/2025     (H) 03/10/2025    PLT 41 (L) 03/10/2025        CMP  Sodium   Date Value Ref Range Status   03/10/2025 144 136 - 145 mmol/L Final     Potassium   Date Value Ref Range Status   03/10/2025 4.2 3.5 - 5.1 mmol/L Final     Chloride   Date Value Ref Range Status   03/10/2025 108 95 - 110 mmol/L Final     CO2   Date Value Ref Range Status   03/10/2025 25 23 - 29 mmol/L Final     Glucose   Date Value Ref Range Status   03/10/2025 106 70 - 110 mg/dL Final     BUN   Date Value Ref Range Status   03/10/2025 19 8 - 23 mg/dL Final     Creatinine   Date Value Ref Range Status   03/10/2025 0.8 0.5 - 1.4 mg/dL Final     Calcium   Date Value Ref Range Status   03/10/2025 8.9 8.7 - 10.5 mg/dL Final     Total Protein   Date Value Ref Range Status   03/10/2025 5.4 (L) 6.0 - 8.4 g/dL Final     Albumin   Date Value Ref Range Status   03/10/2025 3.2 (L) 3.5 - 5.2 g/dL Final     Total Bilirubin   Date Value Ref Range Status   03/10/2025 0.4 0.1 - 1.0 mg/dL Final     Comment:     For infants and newborns, interpretation of results should be based  on gestational age, weight and in agreement with clinical  observations.    Premature Infant recommended reference ranges:  Up to 24 hours.............<8.0 mg/dL  Up to 48 hours............<12.0 mg/dL  3-5 days..................<15.0 mg/dL  6-29 days.................<15.0 mg/dL       Alkaline Phosphatase   Date Value Ref Range Status   03/10/2025 81 40 - 150 U/L Final     AST   Date Value Ref Range Status   03/10/2025 23 10 - 40 U/L Final     ALT   Date Value Ref Range Status   03/10/2025 31 10 - 44 U/L Final     Anion Gap   Date Value Ref Range Status   03/10/2025 11 8 - 16 mmol/L Final     eGFR   Date Value Ref Range Status   03/10/2025 >60 >60 mL/min/1.73 m^2 Final          Imaging:   Hospital imaging reviewed.    Pathology:  Prior pathology reviewed.   Acute GVHD  Scoring:  GVHD Acute Assessment- active skin gvhd grade 1               Assessment and Plan:   Guillaume Salinas (Guillaume) is a pleasant 69 y.o.male with a past medical history of MDS s/p haplo SCT who presents for post-transplant follow up.    MDS: Status post treatment with azacitidine 75 mg/m2 daily x7 days plus venetoclax 100mg (voriconazole) daily x14 of 28 days.Venetoclax decreased to 7 days with cycle 3 until completion of 11 cycles. No plans for maintenance at this time.     Status post allogeneic stem cell transplantation: As noted above, status post haploidentical stem cell transplantation conditioned with FluMel 100 + PTCy+ 2Gy TBI . Currently Day+ 176. Engrafted on 10/09/24 day +20.  Day 30 bone marrow (11/5/2024) showing mildly hypercellular marrow with no increased blast (0.3%) and no evidence of myeloid neoplasm. Pending chimerisms, NGS, and CG  Day 100 bone marrow biopsy on 12/31/24 showed 30-40% cellularity, erythroid hyperplasia, dyserythropoiesis, decreased megakaryocytes with atypical morphology. No hemophagocytosis mentioned. NGS, FISH, and CG normal. 100% chimerisms.     3.    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 He started budesonide 3mg TID on 10/28/24. Due to persistent nausea despite budesonide so started systemic steroids 11/1 at 0.5 mg/kg and his steroid taper has been given to him. Steroid taper completed 12/8/24. No evidence of aGVHD today. 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 Taper completed. Patient's daughter sent messages through the portal about his nausea coming back with no improvement from zofran so budesonide 3mg TID was sent in on 2/6/25. Budesonide taper ended 3/13/25. Can consider tapering off tacrolimus in next  few weeks if GVHD does not return. Skin gvhd present on face with erythema, inflammation, and sand paper feel on 3/14/25- hydrocortisone cream sent in to apply BID.  Current tacro dose: 2 capsule (1 mg) in the morning and 1 capsules (0.5 mg) in the evening.   Last tacro level: 9.3  Adjustments: None    4.     Immunosuppression: Prevention with posaconazole, acyclovir. CMV prophylaxis with letermovir. PJP prophyalxis atovaquone. Continue weekly monitoring of CMV and EBV.  Last CMV: Not-detected,   last EBV:up trending at 223  Active infections: N/A    Pancytopenia: Due to underlying disease and chemotherapy. Transfuse for Hgb <7 g/dL and platelets <10k. Folate and copper deficient, on supplementation. Will continue to monitor to see if platelets begin to rise with his supplements. Promacta sent in on 12/9/24. Change TMP/SMX to atovaquone. Continue promacta and supplements listed below  Folic Acid deficiency: Continue folic acid 1mg daily  Copper deficiency: Copper level of 635, continue copper gluconate 2mg daily   B12 deficiency: B12 injections sent in today. Patient wants to come into clinic for his injections. Plan to do weekly injections for the first month and then monthly after    Post transplant lymphoproliferative disorder: Patient received rituxan on 12/30/24 due to possible PTLD with elevated EBV and possible HLH. Now EBV has improved.   EBV negative today    Hypomagnesemia: Related to tacro, poor po intake. Continue MagOx to 800mg twice daily.      Chemotherapy Induced Nausea: Resolved with steroids and the taper originally ended 12/8/24. He ended up having nausea again while hospitalized and was again started on steroid and taper ended 1/28/25. Patient's daughter sent messages through the portal about his nausea coming back with no improvement from zofran so budesonide 3mg TID was sent in on 2/6/25. Budesonide taper ended 3/13/25. No longer having nausea.      Anxiety, Restless Leg Syndrome: Noted since  discharge by family. He started ropinirole 0.5mg and has experienced significant improvement.  Symptoms have resolved. Continue Ropinirole    Insomnia: Patient now sleeping well and only waking up to urinate at night. Continue Ambien and Ropinirole for RLS.      Hypertension: For awhile patient had been holding off of his carvedilol due to hypotensions and dizziness. His pressures at home have been averaging 170s/90s so he started the carvedilol 6.25mg BID again. Today he complains of orthostatic hypotension symptoms when he takes the medication and BP in clinic is 105/55. Sending in carvedilol 3.125mg BID to see if this can fix him going from hypertensive to hypotensive.     Follow up: Every other week with weekly labs    Orders Placed:      Orders Placed This Encounter    hydrocortisone 1 % cream             Medical Complexity:   Visit today included increased complexity associated with the care of the episodic problems addressed above and managing the longitudinal care of the patient due to the serious and/or complex managed problem(s) history of allo SCT.      Follow Up: Every other week with weekly labs     BMT Route Chart for Scheduling     A total of 35 minutes was spent in pre-visit chart review, personal interpretation of labs and imaging, and medication review. Total visit time 35 minutes, >50 % counseling.       Gilma Ugalde PA-C  Malignant Hematology, Stem Cell Transplant, and Cellular Therapy  [facility]  [facility]

## 2025-03-14 ENCOUNTER — OFFICE VISIT (OUTPATIENT)
Dept: HEMATOLOGY/ONCOLOGY | Facility: CLINIC | Age: 70
End: 2025-03-14
Payer: MEDICARE

## 2025-03-14 VITALS
OXYGEN SATURATION: 95 % | RESPIRATION RATE: 17 BRPM | HEART RATE: 91 BPM | DIASTOLIC BLOOD PRESSURE: 66 MMHG | WEIGHT: 144.06 LBS | SYSTOLIC BLOOD PRESSURE: 121 MMHG | TEMPERATURE: 99 F | HEIGHT: 69 IN | BODY MASS INDEX: 21.34 KG/M2

## 2025-03-14 DIAGNOSIS — D61.818 PANCYTOPENIA: ICD-10-CM

## 2025-03-14 DIAGNOSIS — D89.810 ACUTE GVHD: ICD-10-CM

## 2025-03-14 DIAGNOSIS — D84.822 IMMUNOCOMPROMISED STATE ASSOCIATED WITH STEM CELL TRANSPLANT: ICD-10-CM

## 2025-03-14 DIAGNOSIS — G47.00 INSOMNIA, UNSPECIFIED TYPE: ICD-10-CM

## 2025-03-14 DIAGNOSIS — D47.Z1 PTLD (POST-TRANSPLANT LYMPHOPROLIFERATIVE DISORDER): ICD-10-CM

## 2025-03-14 DIAGNOSIS — Z94.84 IMMUNOCOMPROMISED STATE ASSOCIATED WITH STEM CELL TRANSPLANT: ICD-10-CM

## 2025-03-14 DIAGNOSIS — D46.9 MYELODYSPLASTIC SYNDROME: ICD-10-CM

## 2025-03-14 DIAGNOSIS — I10 PRIMARY HYPERTENSION: ICD-10-CM

## 2025-03-14 DIAGNOSIS — Z94.81 S/P ALLOGENEIC BONE MARROW TRANSPLANT: Primary | ICD-10-CM

## 2025-03-14 PROBLEM — D76.1 HLH (HEMOPHAGOCYTIC LYMPHOHISTIOCYTOSIS): Status: RESOLVED | Noted: 2024-12-30 | Resolved: 2025-03-14

## 2025-03-14 PROCEDURE — 99999 PR PBB SHADOW E&M-EST. PATIENT-LVL IV: CPT | Mod: PBBFAC,,,

## 2025-03-14 RX ORDER — HYDROCORTISONE 1 %
CREAM (GRAM) TOPICAL
Qty: 30 G | Refills: 1 | Status: SHIPPED | OUTPATIENT
Start: 2025-03-14 | End: 2026-03-14

## 2025-03-17 ENCOUNTER — TELEPHONE (OUTPATIENT)
Dept: HEMATOLOGY/ONCOLOGY | Facility: CLINIC | Age: 70
End: 2025-03-17
Payer: MEDICARE

## 2025-03-17 ENCOUNTER — LAB VISIT (OUTPATIENT)
Dept: LAB | Facility: HOSPITAL | Age: 70
End: 2025-03-17
Attending: INTERNAL MEDICINE
Payer: MEDICARE

## 2025-03-17 DIAGNOSIS — D46.9 MYELODYSPLASTIC SYNDROME: ICD-10-CM

## 2025-03-17 DIAGNOSIS — Z76.82 STEM CELL TRANSPLANT CANDIDATE: ICD-10-CM

## 2025-03-17 LAB
ABO + RH BLD: NORMAL
ALBUMIN SERPL BCP-MCNC: 3.5 G/DL (ref 3.5–5.2)
ALP SERPL-CCNC: 66 U/L (ref 40–150)
ALT SERPL W/O P-5'-P-CCNC: 35 U/L (ref 10–44)
ANION GAP SERPL CALC-SCNC: 9 MMOL/L (ref 8–16)
ANISOCYTOSIS BLD QL SMEAR: SLIGHT
AST SERPL-CCNC: 31 U/L (ref 10–40)
BASOPHILS # BLD AUTO: ABNORMAL K/UL (ref 0–0.2)
BASOPHILS NFR BLD: 2 % (ref 0–1.9)
BILIRUB SERPL-MCNC: 0.5 MG/DL (ref 0.1–1)
BLD GP AB SCN CELLS X3 SERPL QL: NORMAL
BUN SERPL-MCNC: 17 MG/DL (ref 8–23)
CALCIUM SERPL-MCNC: 9.3 MG/DL (ref 8.7–10.5)
CHLORIDE SERPL-SCNC: 105 MMOL/L (ref 95–110)
CO2 SERPL-SCNC: 26 MMOL/L (ref 23–29)
CREAT SERPL-MCNC: 1.1 MG/DL (ref 0.5–1.4)
DACRYOCYTES BLD QL SMEAR: ABNORMAL
DIFFERENTIAL METHOD BLD: ABNORMAL
EOSINOPHIL # BLD AUTO: ABNORMAL K/UL (ref 0–0.5)
EOSINOPHIL NFR BLD: 0 % (ref 0–8)
ERYTHROCYTE [DISTWIDTH] IN BLOOD BY AUTOMATED COUNT: 15.7 % (ref 11.5–14.5)
EST. GFR  (NO RACE VARIABLE): >60 ML/MIN/1.73 M^2
GLUCOSE SERPL-MCNC: 109 MG/DL (ref 70–110)
HCT VFR BLD AUTO: 33.5 % (ref 40–54)
HGB BLD-MCNC: 11.3 G/DL (ref 14–18)
HYPOCHROMIA BLD QL SMEAR: ABNORMAL
IMM GRANULOCYTES # BLD AUTO: ABNORMAL K/UL (ref 0–0.04)
IMM GRANULOCYTES NFR BLD AUTO: ABNORMAL % (ref 0–0.5)
LDH SERPL L TO P-CCNC: 253 U/L (ref 110–260)
LYMPHOCYTES # BLD AUTO: ABNORMAL K/UL (ref 1–4.8)
LYMPHOCYTES NFR BLD: 26 % (ref 18–48)
MAGNESIUM SERPL-MCNC: 1.5 MG/DL (ref 1.6–2.6)
MCH RBC QN AUTO: 39.8 PG (ref 27–31)
MCHC RBC AUTO-ENTMCNC: 33.7 G/DL (ref 32–36)
MCV RBC AUTO: 118 FL (ref 82–98)
MONOCYTES # BLD AUTO: ABNORMAL K/UL (ref 0.3–1)
MONOCYTES NFR BLD: 6 % (ref 4–15)
NEUTROPHILS NFR BLD: 58 % (ref 38–73)
NEUTS BAND NFR BLD MANUAL: 8 %
NRBC BLD-RTO: 0 /100 WBC
OVALOCYTES BLD QL SMEAR: ABNORMAL
PHOSPHATE SERPL-MCNC: 3.8 MG/DL (ref 2.7–4.5)
PLATELET # BLD AUTO: 37 K/UL (ref 150–450)
PLATELET BLD QL SMEAR: ABNORMAL
PMV BLD AUTO: 12.2 FL (ref 9.2–12.9)
POIKILOCYTOSIS BLD QL SMEAR: SLIGHT
POLYCHROMASIA BLD QL SMEAR: ABNORMAL
POTASSIUM SERPL-SCNC: 4.9 MMOL/L (ref 3.5–5.1)
PROT SERPL-MCNC: 6.1 G/DL (ref 6–8.4)
RBC # BLD AUTO: 2.84 M/UL (ref 4.6–6.2)
SODIUM SERPL-SCNC: 140 MMOL/L (ref 136–145)
SPECIMEN OUTDATE: NORMAL
URATE SERPL-MCNC: 5.2 MG/DL (ref 3.4–7)
WBC # BLD AUTO: 1.98 K/UL (ref 3.9–12.7)

## 2025-03-17 PROCEDURE — 83735 ASSAY OF MAGNESIUM: CPT | Performed by: INTERNAL MEDICINE

## 2025-03-17 PROCEDURE — 85027 COMPLETE CBC AUTOMATED: CPT | Performed by: INTERNAL MEDICINE

## 2025-03-17 PROCEDURE — 86850 RBC ANTIBODY SCREEN: CPT | Performed by: INTERNAL MEDICINE

## 2025-03-17 PROCEDURE — 80053 COMPREHEN METABOLIC PANEL: CPT | Performed by: INTERNAL MEDICINE

## 2025-03-17 PROCEDURE — 85007 BL SMEAR W/DIFF WBC COUNT: CPT | Performed by: INTERNAL MEDICINE

## 2025-03-17 PROCEDURE — 84100 ASSAY OF PHOSPHORUS: CPT | Performed by: INTERNAL MEDICINE

## 2025-03-17 PROCEDURE — 80197 ASSAY OF TACROLIMUS: CPT | Performed by: INTERNAL MEDICINE

## 2025-03-17 PROCEDURE — 84550 ASSAY OF BLOOD/URIC ACID: CPT | Performed by: INTERNAL MEDICINE

## 2025-03-17 PROCEDURE — 83615 LACTATE (LD) (LDH) ENZYME: CPT | Performed by: INTERNAL MEDICINE

## 2025-03-17 PROCEDURE — 87799 DETECT AGENT NOS DNA QUANT: CPT | Performed by: INTERNAL MEDICINE

## 2025-03-18 DIAGNOSIS — D46.9 MYELODYSPLASTIC SYNDROME: ICD-10-CM

## 2025-03-18 DIAGNOSIS — G47.00 INSOMNIA, UNSPECIFIED TYPE: ICD-10-CM

## 2025-03-18 LAB
CMV DNA SPEC QL NAA+PROBE: NORMAL
CYTOMEGALOVIRUS PCR, QUANT: NOT DETECTED IU/ML
EPSTEIN-BARR VIRUS DNA: NORMAL
EPSTEIN-BARR VIRUS PCR, QUANT: NOT DETECTED IU/ML
TACROLIMUS BLD-MCNC: 16.1 NG/ML (ref 5–15)

## 2025-03-18 RX ORDER — TACROLIMUS 0.5 MG/1
CAPSULE ORAL
Qty: 90 CAPSULE | Refills: 5 | Status: SHIPPED | OUTPATIENT
Start: 2025-03-18

## 2025-03-18 NOTE — PROGRESS NOTES
BMT Pharmacist Immunosuppression Note:    Reviewed patient's tacrolimus level with Dr. Hickey and it is above goal range. Instructed patient to decrease your current regimen of 2 capsules (1mg) in the morning and 1 capsules (0.5mg) in the evening to 1 capsules (0.5 mg) in the morning and 1 capsules (0.5mg) in the evening.       Terese Cantor, PharmD  Clinical Pharmacy Specialist, Bone Marrow Transplant/Hematology  Spectra link: [phone]

## 2025-03-18 NOTE — PROGRESS NOTES
Section of Hematology and Stem Cell Transplantation    Post-Transplantation Follow Up Visit       Notes:    03/21/2025    Transplant History:   Primary oncologist: Paco Hickey MD  Primary oncologic diagnosis: MDS  Transplant date: 9/19/2024  Donor: haploidentical  Blood Type (Patient): B +  Blood Type (Donor): A +  CMV (Patient): Positive  CMV (Donor): Positive  Graft source: Bone marrow  CD34+ cell dose: 3.55x10^6  Conditioning Regimen: Fludarabine plus melphalan 100 mg/m2 + 2Gy TBI  GVHD prophylaxis: Post-transplant cyclophosphamide, Tacrolimus, MMF  Immediate post-transplant complications: Patient experienced expected GI toxicities with C diff negative diarrhea and nausea, neutropenic fever with negative infectious work up, expected cytopenias requiring transfusions, electrolyte abnormalities requiring replacement, volume overload requiring diuresis, intermittent SOB from pulmonary edema requiring 02, and a hemorrhoid flare up. Diarrhea and SOB improved towards the end of the hospital stay.     History of Present Ilness:   Guillaume Salinas (Guillaume) is a pleasant 69 y.o.male with a past medical history of MDS who is status post haploidentical stem cell transplantation conditioned with FluMel 100 + PTCy+ 2Gy TBI who is currently day +183  who presents for post-transplant follow up.  services used today.     Interval History:   Patient presents for routine follow-up post allogeneic transplant.  He is doing well overall and is no longer having headaches. His GVHD rash on his face is improving but still present. He has been eating and drinking very well. His energy is doing great. Patient states he  has been having watery eyes for the past few days and he does not take allergy medications. He reports having to urinate often still which has been an ongoing problem since transplant. No n/v/d. He continues to work with PT.  He received a B12 injection today in clinic.       PAST MEDICAL HISTORY:    Past Medical History:   Diagnosis Date    Anticoagulant long-term use     Coronary artery disease     Hypertension     Myelodysplastic syndrome     Peripheral vascular disease, unspecified        PAST SURGICAL HISTORY:   Past Surgical History:   Procedure Laterality Date    BONE MARROW BIOPSY Left 2023    Procedure: Biopsy-bone marrow;  Surgeon: Harry Diamond MD;  Location: [facility] OR;  Service: Oncology;  Laterality: Left;    COLONOSCOPY N/A 2022    Procedure: COLONOSCOPY Golytely Vaccinated will bring cards;  Surgeon: Dereje Simon MD;  Location: [facility] ENDO;  Service: Endoscopy;  Laterality: N/A;  Do not cancel this order    INSERTION OF LEMONS CATHETER Right 2024    Procedure: INSERTION, CATHETER, CENTRAL VENOUS, LEMONS TRIPLE LUMEN;  Surgeon: Kg Patten MD;  Location: [address];  Service: General;  Laterality: Right;     PAST SOCIAL HISTORY:  Social History     Socioeconomic History    Marital status:    Tobacco Use    Smoking status: Former     Current packs/day: 0.00     Average packs/day: 0.3 packs/day for 50.0 years (12.5 ttl pk-yrs)     Types: Cigarettes     Start date: 3/1/1973     Quit date: 3/1/2023     Years since quittin.0     Passive exposure: Past    Smokeless tobacco: Never   Substance and Sexual Activity    Alcohol use: Not Currently    Drug use: Never    Sexual activity: Not Currently     Partners: Female     Social Drivers of Health     Financial Resource Strain: Patient Declined (2024)    Overall Financial Resource Strain (CARDIA)     Difficulty of Paying Living Expenses: Patient declined   Food Insecurity: Patient Declined (2024)    Hunger Vital Sign     Worried About Running Out of Food in the Last Year: Patient declined     Ran Out of Food in the Last Year: Patient declined   Transportation Needs: Patient Declined (2024)    TRANSPORTATION NEEDS     Transportation : Patient declined   Physical Activity: Inactive  (1/10/2025)    Exercise Vital Sign     Days of Exercise per Week: 0 days     Minutes of Exercise per Session: 10 min   Stress: Patient Declined (12/28/2024)    [employer] - Occupational Stress Questionnaire     Feeling of Stress : Patient declined   Recent Concern: Stress - Stress Concern Present (9/30/2024)    [employer] - Occupational Stress Questionnaire     Feeling of Stress : To some extent   Housing Stability: Patient Declined (12/28/2024)    Housing Stability Vital Sign     Unable to Pay for Housing in the Last Year: Patient declined     Homeless in the Last Year: Patient declined       FAMILY HISTORY:  Cancer-related family history includes Cancer in his brother and father.    CURRENT MEDICATIONS:   Medication List with Changes/Refills   Current Medications    ACYCLOVIR (ZOVIRAX) 800 MG TAB    Take 1 tablet (800 mg total) by mouth 2 (two) times daily.    ATOVAQUONE (MEPRON) 750 MG/5 ML SUSP ORAL LIQUID    Take 10 mLs (1,500 mg total) by mouth once daily.    BUDESONIDE (ENTOCORT EC) 3 MG CAPSULE    Take 1 capsule (3 mg total) by mouth 3 (three) times daily.    CARVEDILOL (COREG) 3.125 MG TABLET    Take 1 tablet (3.125 mg total) by mouth 2 (two) times daily.    CYANOCOBALAMIN 1,000 MCG/ML INJECTION    Inject 1 mL (1,000 mcg total) into the muscle once a week.    ELTROMBOPAG OLAMINE (PROMACTA) 50 MG TAB    Take 1 tablet (50 mg total) by mouth once daily.    FOLIC ACID (FOLVITE) 1 MG TABLET    Take 1 tablet (1 mg total) by mouth once daily.    GABAPENTIN (NEURONTIN) 300 MG CAPSULE    Take 2 capsules (600 mg total) by mouth nightly as needed (Restless leg).    HYDROCORTISONE 1 % CREAM    Apply to affected area 2 times daily    LETERMOVIR (PREVYMIS) 480 MG TAB    Take 1 tablet (480 mg total) by mouth Daily.    LEVOFLOXACIN (LEVAQUIN) 500 MG TABLET    Take 1 tablet (500 mg total) by mouth once daily.    LOPERAMIDE (IMODIUM A-D) 2 MG TAB    Take 2 mg by  mouth 4 (four) times daily as needed.    MAGNESIUM OXIDE (MAG-OX) 400 MG (241.3 MG MAGNESIUM) TABLET    Take 2 tablets (800 mg total) by mouth 2 (two) times daily.    ONDANSETRON (ZOFRAN-ODT) 8 MG TBDL    Take 1 tablet (8 mg total) by mouth every 8 (eight) hours as needed (nausea).    PANTOPRAZOLE (PROTONIX) 40 MG TABLET    Take 1 tablet (40 mg total) by mouth once daily.    POSACONAZOLE (NOXAFIL) 100 MG TBEC TABLET    Take 3 tablets (300 mg total) by mouth once daily.    POSACONAZOLE (NOXAFIL) 100 MG TBEC TABLET    Take 3 tablets (300 mg total) by mouth once daily.    ROPINIROLE (REQUIP) 0.5 MG TABLET    Take 1 tablet (0.5 mg total) by mouth every evening.    TACROLIMUS (PROGRAF) 0.5 MG CAP    Take 1 capsule (0.5 mg total) by mouth every morning AND 1 capsule (0.5 mg total) every evening.    ZOLPIDEM (AMBIEN) 5 MG TAB    Take 1 tablet (5 mg total) by mouth nightly as needed (insomnia).       ALLERGIES:   Review of patient's allergies indicates:  No Known Allergies    GVHD Review of Systems:     Pertinent positives and negatives included in the HPI. Otherwise a 14 point review of systems is negative. GVHD review of systems recorded in BMT flowsheet.     Physical Exam:     Vitals:    03/21/25 0917   BP: 116/65   Pulse: 91   Temp: 98.6 °F (37 °C)         General: Appears well, NAD.   HEENT: MMM, no OP lesions  Pulmonary: CTAB, no increased work of breathing, no W/R/C  Cardiovascular: S1S2 normal, RRR, no M/R/G  Abdominal: Soft, NT, ND, BS+, no HSM  Extremities: No C/C/E  Neurological: AAOx4, grossly normal, no focal deficits  Dermatologic: GVHD face rash improving but still present     ECOG Performance Status: (foot note - ECOG PS provided by Eastern Cooperative Oncology Group) 1 - Symptomatic but completely ambulatory    Karnofsky Performance Score:  80%- Normal Activity with Effort: Some Symptoms of Disease    Labs:   Lab Results   Component Value Date    WBC 1.98 (LL) 03/17/2025    HGB 11.3 (L) 03/17/2025    HCT  33.5 (L) 03/17/2025     (H) 03/17/2025    PLT 37 (LL) 03/17/2025        CMP  Sodium   Date Value Ref Range Status   03/17/2025 140 136 - 145 mmol/L Final     Potassium   Date Value Ref Range Status   03/17/2025 4.9 3.5 - 5.1 mmol/L Final     Chloride   Date Value Ref Range Status   03/17/2025 105 95 - 110 mmol/L Final     CO2   Date Value Ref Range Status   03/17/2025 26 23 - 29 mmol/L Final     Glucose   Date Value Ref Range Status   03/17/2025 109 70 - 110 mg/dL Final     BUN   Date Value Ref Range Status   03/17/2025 17 8 - 23 mg/dL Final     Creatinine   Date Value Ref Range Status   03/17/2025 1.1 0.5 - 1.4 mg/dL Final     Calcium   Date Value Ref Range Status   03/17/2025 9.3 8.7 - 10.5 mg/dL Final     Total Protein   Date Value Ref Range Status   03/17/2025 6.1 6.0 - 8.4 g/dL Final     Albumin   Date Value Ref Range Status   03/17/2025 3.5 3.5 - 5.2 g/dL Final     Total Bilirubin   Date Value Ref Range Status   03/17/2025 0.5 0.1 - 1.0 mg/dL Final     Comment:     For infants and newborns, interpretation of results should be based  on gestational age, weight and in agreement with clinical  observations.    Premature Infant recommended reference ranges:  Up to 24 hours.............<8.0 mg/dL  Up to 48 hours............<12.0 mg/dL  3-5 days..................<15.0 mg/dL  6-29 days.................<15.0 mg/dL       Alkaline Phosphatase   Date Value Ref Range Status   03/17/2025 66 40 - 150 U/L Final     AST   Date Value Ref Range Status   03/17/2025 31 10 - 40 U/L Final     ALT   Date Value Ref Range Status   03/17/2025 35 10 - 44 U/L Final     Anion Gap   Date Value Ref Range Status   03/17/2025 9 8 - 16 mmol/L Final     eGFR   Date Value Ref Range Status   03/17/2025 >60 >60 mL/min/1.73 m^2 Final          Imaging:   Hospital imaging reviewed.    Pathology:  Prior pathology reviewed.   Acute GVHD Scoring:  GVHD Acute Assessment- active skin gvhd grade 1               Assessment and Plan:   Guillaume  Betsy Salinas (Guillaume) is a pleasant 69 y.o.male with a past medical history of MDS s/p haplo SCT who presents for post-transplant follow up.    MDS: Status post treatment with azacitidine 75 mg/m2 daily x7 days plus venetoclax 100mg (voriconazole) daily x14 of 28 days.Venetoclax decreased to 7 days with cycle 3 until completion of 11 cycles. No plans for maintenance at this time.     Status post allogeneic stem cell transplantation: As noted above, status post haploidentical stem cell transplantation conditioned with FluMel 100 + PTCy+ 2Gy TBI . Currently Day+ 183. Engrafted on 10/09/24 day +20.  Day 30 bone marrow (11/5/2024) showing mildly hypercellular marrow with no increased blast (0.3%) and no evidence of myeloid neoplasm. Pending chimerisms, NGS, and CG  Day 100 bone marrow biopsy on 12/31/24 showed 30-40% cellularity, erythroid hyperplasia, dyserythropoiesis, decreased megakaryocytes with atypical morphology. No hemophagocytosis mentioned. NGS, FISH, and CG normal. 100% chimerisms.     3.    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 He started budesonide 3mg TID on 10/28/24. Due to persistent nausea despite budesonide so started systemic steroids 11/1 at 0.5 mg/kg and his steroid taper has been given to him. Steroid taper completed 12/8/24. No evidence of aGVHD today. 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 Taper completed. Patient's daughter sent messages through the portal about his nausea coming back with no improvement from zofran so budesonide 3mg TID was sent in on 2/6/25. Budesonide taper ended 3/13/25. Can consider tapering off tacrolimus in next few weeks if GVHD does not return. Skin gvhd present on face with erythema, inflammation, and sand  paper feel on 3/14/25.   A. Face rash improved but still present- continue hydrocortisone cream BID  B. Current tacro dose: 1mg qAM and 0.5mg qPM  C. Last tacro level: 16.1  D. Adjustments: 0.5 mg BID    4.     Immunosuppression: Prevention with posaconazole, acyclovir. CMV prophylaxis with letermovir. PJP prophyalxis atovaquone. Continue weekly monitoring of CMV and EBV.  Last CMV: Not-detected,   last EBV:up trending at 223  Active infections: N/A    Pancytopenia: Due to underlying disease and chemotherapy. Transfuse for Hgb <7 g/dL and platelets <10k. Folate and copper deficient, on supplementation. Will continue to monitor to see if platelets begin to rise with his supplements. Promacta sent in on 12/9/24. Change TMP/SMX to atovaquone. Continue promacta and supplements listed below  Folic Acid deficiency: Continue folic acid 1mg daily  Copper deficiency: Copper level of 635, continue copper gluconate 2mg daily   B12 deficiency: B12 injections sent in today. Patient wants to come into clinic for his injections. Plan to do weekly injections for the first month and then monthly after    Post transplant lymphoproliferative disorder: Patient received rituxan on 12/30/24 due to possible PTLD with elevated EBV and possible HLH. Now EBV has improved.   EBV negative today    Hypomagnesemia: Related to tacro, poor po intake. Continue MagOx to 800mg twice daily.      Chemotherapy Induced Nausea: Resolved with steroids and the taper originally ended 12/8/24. He ended up having nausea again while hospitalized and was again started on steroid and taper ended 1/28/25. Patient's daughter sent messages through the portal about his nausea coming back with no improvement from zofran so budesonide 3mg TID was sent in on 2/6/25. Budesonide taper ended 3/13/25. No longer having nausea.      Anxiety, Restless Leg Syndrome: Noted since discharge by family. He started ropinirole 0.5mg and has experienced significant  improvement.  Symptoms have resolved. Continue Ropinirole    Insomnia: Patient now sleeping well and only waking up to urinate at night. Continue Ambien and Ropinirole for RLS.      Hypertension: For awhile patient had been holding off of his carvedilol due to hypotensions and dizziness. His pressures at home have been averaging 170s/90s so he started the carvedilol 6.25mg BID again. Today he complains of orthostatic hypotension symptoms when he takes the medication and BP in clinic is 105/55. Sending in carvedilol 3.125mg BID to see if this can fix him going from hypertensive to hypotensive.     Follow up: Every other week with weekly labs    Orders Placed:             Follow Up: Every other week with weekly labs     BMT Route Chart for Scheduling     A total of 35 minutes was spent in pre-visit chart review, personal interpretation of labs and imaging, and medication review. Total visit time 35 minutes, >50 % counseling.       Gilma Ugalde PA-C  Malignant Hematology, Stem Cell Transplant, and Cellular Therapy  [facility]  [facility]

## 2025-03-19 ENCOUNTER — CLINICAL SUPPORT (OUTPATIENT)
Dept: REHABILITATION | Facility: HOSPITAL | Age: 70
End: 2025-03-19
Payer: MEDICARE

## 2025-03-19 DIAGNOSIS — R68.89 DECREASED STRENGTH, ENDURANCE, AND MOBILITY: Primary | ICD-10-CM

## 2025-03-19 DIAGNOSIS — Z74.09 DECREASED STRENGTH, ENDURANCE, AND MOBILITY: Primary | ICD-10-CM

## 2025-03-19 DIAGNOSIS — R53.1 DECREASED STRENGTH, ENDURANCE, AND MOBILITY: Primary | ICD-10-CM

## 2025-03-19 PROCEDURE — 97530 THERAPEUTIC ACTIVITIES: CPT | Mod: PN

## 2025-03-19 PROCEDURE — 97112 NEUROMUSCULAR REEDUCATION: CPT | Mod: PN

## 2025-03-19 RX ORDER — ZOLPIDEM TARTRATE 5 MG/1
5 TABLET ORAL NIGHTLY PRN
Qty: 30 TABLET | Refills: 0 | Status: SHIPPED | OUTPATIENT
Start: 2025-03-19

## 2025-03-19 NOTE — PROGRESS NOTES
[facility]   Physical Therapy Treatment Note      Name: Guillaume Salinas  Clinic Number: [id number]    Therapy Diagnosis:   Encounter Diagnosis   Name Primary?    Decreased strength, endurance, and mobility Yes       Physician: Mohit Hickey MD    Visit Date: 3/19/2025    Physician Orders: PT Eval and Treat   Medical Diagnosis from Referral: Z94.81 (ICD-10-CM) - S/P allogeneic bone marrow transplant   Evaluation Date: 12/20/2024  Authorization Period Expiration: 12/09/2025   Plan of Care Expiration: 3/20/25  Progress Note Due: 3/20/25  Date of Surgery: 9/19/14  Visit # / Visits authorized: 5/ 10 +eval  FOTO: 1/ 3     Precautions: Standard and HTN,Myelodysplastic syndrome, PVD, CAD, chemotherapy        Time In: 2:06PM   Time Out: 3:00 PM  Total Billable Time: 54 minutes    PTA Visit #: 0/5     Subjective     Patient reports: He is just a little tired. He had a headache earlier but it is over now. He was very tired after his last session. He missed his last scheduled visit because he was so tired from his last PT appointment. He did not have any issues during but more so after his session.  He is still having difficulty with lifting up his arm and reaching things that are overhead. He just feels very weak in his arms.   He was not compliant with home exercise program.  Response to previous treatment: very good, a little tired  Functional change: ongoing    Pain: 0/10  Location: bilateral legs     Objective    2/21/25    30 second sit to stand: 11 reps without use of hands  6 Minute walk test: 1342 ft     Treatment     Guillaume received the treatments listed below:      neuromuscular re-education activities to improve: Balance, Coordination, Proprioception, and Posture for 15 minutes. The following activities were included:  Side lying clams 3x10 ea   Supine Straight leg raise 3x10 ea  Bridges 3x10  Short arc quad 3lb 3x10 ea    therapeutic activities to improve functional  performance for 39 minutes, includin minute walk around clinic (NT)  Nustep x 8 minutes (NT)  Seated march x30 3lb  Seated long arc quad 3lb x30ea  Seated hamstring curl green theraband 3x10 ea   Standing hip abduction 3x10 ea  Standing heel raises 3x10   Supine active range of motion shoulder flexion 3lb wand x30   Supine chest press 3lb wand 3x10  Seated Bicep curl 3lb 3x10 ea   Sit to stand from raised mat x10 (only performed 8 before needing to stop)    Patient Education and Home Exercises       Education provided:   - updated Home exercise program     Written Home Exercises Provided: Pt instructed to continue prior HEP. Exercises were reviewed and Guillaume was able to demonstrate them prior to the end of the session.  Guillaume demonstrated good  understanding of the education provided. See Electronic Medical Record under Patient Instructions for exercises provided during therapy sessions    Assessment     Mr. Galaviz arrived to today's treatment session with reports of increase in fatigue. He also reported significant increase in fatigue after his last treatment session. Therefore, PT provided multiple rest breaks throughout his treatment session to allow proper recovery time. Patient also did not perform walking or Nustep for endurance and functional strengthening due to fatigue today.   Guillaume Is progressing well towards his goals.   Patient prognosis is Fair.     Patient will continue to benefit from skilled outpatient physical therapy to address the deficits listed in the problem list box on initial evaluation, provide pt/family education and to maximize pt's level of independence in the home and community environment.     Patient's spiritual, cultural and educational needs considered and pt agreeable to plan of care and goals.     Anticipated barriers to physical therapy: co-morbidities    Goals: Short Term Goals: 6 weeks   Patient will be independent with Home exercise program to supplement therapy  progressing, not met   Patient will be able to ambulate 1300 ft with 6 Minute walk test to improve endurance for community mobility  met  Patient will be able to lift and carry groceries without difficulty to improve ability to perform functional tasks  met     Long Term Goals: 12 weeks   Patient will be able to ambulate 1400 ft or more with 6 Minute walk test to improve endurance for community mobility progressing, not met  Patient will be able to perform 16 sit to stand on 30 second sit to  order to improve functional strength and endurance for transfers progressing, not met  Patient will improve FOTO score to 65 % to improve self perceived ability to perform functional tasks progressing, not met  Patient goal: Patient will be able to mow his lawn to return to prior level of function progressing, not met    Plan     Improve functional strength and endurance    Elvira Joyce, PT ,DPT,OCS      03/19/2025

## 2025-03-21 ENCOUNTER — CLINICAL SUPPORT (OUTPATIENT)
Dept: REHABILITATION | Facility: HOSPITAL | Age: 70
End: 2025-03-21
Payer: MEDICARE

## 2025-03-21 ENCOUNTER — OFFICE VISIT (OUTPATIENT)
Dept: HEMATOLOGY/ONCOLOGY | Facility: CLINIC | Age: 70
End: 2025-03-21
Payer: MEDICARE

## 2025-03-21 ENCOUNTER — CLINICAL SUPPORT (OUTPATIENT)
Dept: HEMATOLOGY/ONCOLOGY | Facility: CLINIC | Age: 70
End: 2025-03-21
Payer: MEDICARE

## 2025-03-21 VITALS
BODY MASS INDEX: 20.88 KG/M2 | DIASTOLIC BLOOD PRESSURE: 65 MMHG | WEIGHT: 141 LBS | SYSTOLIC BLOOD PRESSURE: 116 MMHG | HEART RATE: 91 BPM | OXYGEN SATURATION: 98 % | HEIGHT: 69 IN | TEMPERATURE: 99 F

## 2025-03-21 DIAGNOSIS — D46.9 MYELODYSPLASTIC SYNDROME: Primary | ICD-10-CM

## 2025-03-21 DIAGNOSIS — D89.810 ACUTE GVHD: ICD-10-CM

## 2025-03-21 DIAGNOSIS — D61.818 PANCYTOPENIA: ICD-10-CM

## 2025-03-21 DIAGNOSIS — G25.81 RESTLESS LEG SYNDROME: ICD-10-CM

## 2025-03-21 DIAGNOSIS — G47.00 INSOMNIA, UNSPECIFIED TYPE: ICD-10-CM

## 2025-03-21 DIAGNOSIS — Z74.09 DECREASED STRENGTH, ENDURANCE, AND MOBILITY: Primary | ICD-10-CM

## 2025-03-21 DIAGNOSIS — Z94.84 IMMUNOCOMPROMISED STATE ASSOCIATED WITH STEM CELL TRANSPLANT: ICD-10-CM

## 2025-03-21 DIAGNOSIS — Z94.81 S/P ALLOGENEIC BONE MARROW TRANSPLANT: ICD-10-CM

## 2025-03-21 DIAGNOSIS — R53.1 DECREASED STRENGTH, ENDURANCE, AND MOBILITY: Primary | ICD-10-CM

## 2025-03-21 DIAGNOSIS — R68.89 DECREASED STRENGTH, ENDURANCE, AND MOBILITY: Primary | ICD-10-CM

## 2025-03-21 DIAGNOSIS — D84.822 IMMUNOCOMPROMISED STATE ASSOCIATED WITH STEM CELL TRANSPLANT: ICD-10-CM

## 2025-03-21 DIAGNOSIS — E53.8 B12 DEFICIENCY: Primary | ICD-10-CM

## 2025-03-21 PROCEDURE — 97112 NEUROMUSCULAR REEDUCATION: CPT | Mod: PN

## 2025-03-21 PROCEDURE — 97530 THERAPEUTIC ACTIVITIES: CPT | Mod: PN

## 2025-03-21 PROCEDURE — 99999 PR PBB SHADOW E&M-EST. PATIENT-LVL IV: CPT | Mod: PBBFAC,,,

## 2025-03-21 RX ORDER — CYANOCOBALAMIN 1000 UG/ML
1000 INJECTION, SOLUTION INTRAMUSCULAR; SUBCUTANEOUS
Status: COMPLETED | OUTPATIENT
Start: 2025-03-21 | End: 2025-03-21

## 2025-03-21 RX ADMIN — CYANOCOBALAMIN 1000 MCG: 1000 INJECTION, SOLUTION INTRAMUSCULAR; SUBCUTANEOUS at 09:03

## 2025-03-21 NOTE — PROGRESS NOTES
[facility]   Physical Therapy Treatment Note      Name: Guillaume Salnias  Clinic Number: [id number]    Therapy Diagnosis:   Encounter Diagnosis   Name Primary?    Decreased strength, endurance, and mobility Yes         Physician: Mohit Hickey MD    Visit Date: 3/21/2025    Physician Orders: PT Eval and Treat   Medical Diagnosis from Referral: Z94.81 (ICD-10-CM) - S/P allogeneic bone marrow transplant   Evaluation Date: 2024  Authorization Period Expiration: 2025   Plan of Care Expiration: 3/20/25  Progress Note Due: 3/20/25  Date of Surgery: 14  Visit # / Visits authorized: 5/ 10 +eval  FOTO: 1/ 3     Precautions: Standard and HTN,Myelodysplastic syndrome, PVD, CAD, chemotherapy        Time In: 1200PM   Time Out: 1:00PM  Total Billable Time: 30 minutes    PTA Visit #: 0/5     Subjective     Patient reports: He is feeling good. He was just a little tired after the other. If he had to rank his tiredness today he is about a 3-4/10.    He was not compliant with home exercise program.  Response to previous treatment: very good, a little tired  Functional change: ongoing    Pain: 0/10  Location: bilateral legs     Objective    25    30 second sit to stand: 11 reps without use of hands  6 Minute walk test: 1342 ft     Treatment     Guillaume received the treatments listed below:      neuromuscular re-education activities to improve: Balance, Coordination, Proprioception, and Posture for 14 minutes. The following activities were included:  Side lying clams 3x10 ea   Supine Straight leg raise 3x10 ea  Bridges 3x10  Short arc quad 3lb 3x10 ea    therapeutic activities to improve functional performance for 45 minutes, includin minute walk around clinic   Nustep x 8 minutes (NT)  Seated march x30 3lb  Seated long arc quad 3lb x30ea  Seated hamstring curl green theraband 3x10 ea     Supine active range of motion shoulder flexion 3lb wand x30   Supine chest press  3lb wand 3x10  Seated Bicep curl 3lb 3x10 ea     NT:  Standing hip abduction 3x10 ea  Standing heel raises 3x10   Sit to stand from raised mat x10 (only performed 8 before needing to stop)    Patient Education and Home Exercises       Education provided:   - updated Home exercise program     Written Home Exercises Provided: Pt instructed to continue prior HEP. Exercises were reviewed and Guillaume was able to demonstrate them prior to the end of the session.  Guillaume demonstrated good  understanding of the education provided. See Electronic Medical Record under Patient Instructions for exercises provided during therapy sessions    Assessment     Mr. Galaviz reported slight fatigue after his last session and therefore no progressions were made today. PT continues to encourage rest breaks throughout session to improve patient's tolerance to exercises. PT to reassess at next visit.   Guillaume Is progressing well towards his goals.   Patient prognosis is Fair.     Patient will continue to benefit from skilled outpatient physical therapy to address the deficits listed in the problem list box on initial evaluation, provide pt/family education and to maximize pt's level of independence in the home and community environment.     Patient's spiritual, cultural and educational needs considered and pt agreeable to plan of care and goals.     Anticipated barriers to physical therapy: co-morbidities    Goals: Short Term Goals: 6 weeks   Patient will be independent with Home exercise program to supplement therapy progressing, not met   Patient will be able to ambulate 1300 ft with 6 Minute walk test to improve endurance for community mobility  met  Patient will be able to lift and carry groceries without difficulty to improve ability to perform functional tasks  met     Long Term Goals: 12 weeks   Patient will be able to ambulate 1400 ft or more with 6 Minute walk test to improve endurance for community mobility progressing, not  met  Patient will be able to perform 16 sit to stand on 30 second sit to  order to improve functional strength and endurance for transfers progressing, not met  Patient will improve FOTO score to 65 % to improve self perceived ability to perform functional tasks progressing, not met  Patient goal: Patient will be able to mow his lawn to return to prior level of function progressing, not met    Plan     Improve functional strength and endurance    Elvira Joyce, PT ,DPT,OCS      03/21/2025

## 2025-03-24 ENCOUNTER — TELEPHONE (OUTPATIENT)
Dept: HEMATOLOGY/ONCOLOGY | Facility: CLINIC | Age: 70
End: 2025-03-24
Payer: MEDICARE

## 2025-03-24 ENCOUNTER — PATIENT MESSAGE (OUTPATIENT)
Dept: HEMATOLOGY/ONCOLOGY | Facility: CLINIC | Age: 70
End: 2025-03-24
Payer: MEDICARE

## 2025-03-24 ENCOUNTER — LAB VISIT (OUTPATIENT)
Dept: LAB | Facility: HOSPITAL | Age: 70
End: 2025-03-24
Attending: INTERNAL MEDICINE
Payer: MEDICARE

## 2025-03-24 ENCOUNTER — DOCUMENTATION ONLY (OUTPATIENT)
Dept: HEMATOLOGY/ONCOLOGY | Facility: CLINIC | Age: 70
End: 2025-03-24
Payer: MEDICARE

## 2025-03-24 DIAGNOSIS — Z76.82 STEM CELL TRANSPLANT CANDIDATE: ICD-10-CM

## 2025-03-24 DIAGNOSIS — D46.9 MYELODYSPLASTIC SYNDROME: ICD-10-CM

## 2025-03-24 LAB
ABSOLUTE NEUTROPHIL MANUAL (OHS): 1.5 K/UL
BASOPHILS NFR BLD MANUAL: 2 %
ERYTHROCYTE [DISTWIDTH] IN BLOOD BY AUTOMATED COUNT: 14.6 % (ref 11.5–14.5)
HCT VFR BLD AUTO: 32.7 % (ref 40–54)
HGB BLD-MCNC: 10.9 GM/DL (ref 14–18)
INDIRECT COOMBS: NORMAL
LYMPHOCYTES NFR BLD MANUAL: 18 % (ref 18–48)
MCH RBC QN AUTO: 39.4 PG (ref 27–50)
MCHC RBC AUTO-ENTMCNC: 33.3 G/DL (ref 32–36)
MCV RBC AUTO: 118 FL (ref 82–98)
METAMYELOCYTES NFR BLD MANUAL: 2 %
MONOCYTES NFR BLD MANUAL: 8 % (ref 4–15)
NEUTROPHILS NFR BLD MANUAL: 70 % (ref 38–73)
NUCLEATED RBC (/100WBC) (OHS): 0 /100 WBC
PLATELET # BLD AUTO: 36 K/UL (ref 150–450)
PLATELET BLD QL SMEAR: ABNORMAL
PMV BLD AUTO: 11.4 FL (ref 9.2–12.9)
RBC # BLD AUTO: 2.77 M/UL (ref 4.6–6.2)
RH BLD: NORMAL
SPECIMEN OUTDATE: NORMAL
WBC # BLD AUTO: 2.14 K/UL (ref 3.9–12.7)

## 2025-03-24 PROCEDURE — 86901 BLOOD TYPING SEROLOGIC RH(D): CPT | Performed by: INTERNAL MEDICINE

## 2025-03-24 PROCEDURE — 36415 COLL VENOUS BLD VENIPUNCTURE: CPT | Mod: 59

## 2025-03-24 PROCEDURE — 85027 COMPLETE CBC AUTOMATED: CPT

## 2025-03-25 ENCOUNTER — LAB VISIT (OUTPATIENT)
Dept: LAB | Facility: HOSPITAL | Age: 70
End: 2025-03-25
Attending: INTERNAL MEDICINE
Payer: MEDICARE

## 2025-03-25 DIAGNOSIS — Z76.82 STEM CELL TRANSPLANT CANDIDATE: ICD-10-CM

## 2025-03-25 LAB
ALBUMIN SERPL BCP-MCNC: 3.5 G/DL (ref 3.5–5.2)
ALP SERPL-CCNC: 75 UNIT/L (ref 40–150)
ALT SERPL W/O P-5'-P-CCNC: 27 UNIT/L (ref 10–44)
ANION GAP (OHS): 11 MMOL/L (ref 8–16)
AST SERPL-CCNC: 29 UNIT/L (ref 11–45)
BILIRUB SERPL-MCNC: 0.4 MG/DL (ref 0.1–1)
BUN SERPL-MCNC: 15 MG/DL (ref 8–23)
CALCIUM SERPL-MCNC: 9 MG/DL (ref 8.7–10.5)
CHLORIDE SERPL-SCNC: 105 MMOL/L (ref 95–110)
CO2 SERPL-SCNC: 22 MMOL/L (ref 23–29)
CREAT SERPL-MCNC: 1.3 MG/DL (ref 0.5–1.4)
GFR SERPLBLD CREATININE-BSD FMLA CKD-EPI: 59 ML/MIN/1.73/M2
GLUCOSE SERPL-MCNC: 152 MG/DL (ref 70–110)
LDH SERPL-CCNC: 235 U/L (ref 110–260)
MAGNESIUM SERPL-MCNC: 1.6 MG/DL (ref 1.6–2.6)
PHOSPHATE SERPL-MCNC: 3.2 MG/DL (ref 2.7–4.5)
POTASSIUM SERPL-SCNC: 4.2 MMOL/L (ref 3.5–5.1)
PROT SERPL-MCNC: 6.1 GM/DL (ref 6–8.4)
SODIUM SERPL-SCNC: 138 MMOL/L (ref 136–145)
URATE SERPL-MCNC: 5.7 MG/DL (ref 3.4–7)

## 2025-03-25 PROCEDURE — 80197 ASSAY OF TACROLIMUS: CPT

## 2025-03-25 PROCEDURE — 83615 LACTATE (LD) (LDH) ENZYME: CPT

## 2025-03-25 PROCEDURE — 80053 COMPREHEN METABOLIC PANEL: CPT

## 2025-03-25 PROCEDURE — 84550 ASSAY OF BLOOD/URIC ACID: CPT

## 2025-03-25 PROCEDURE — 36415 COLL VENOUS BLD VENIPUNCTURE: CPT

## 2025-03-25 PROCEDURE — 87799 DETECT AGENT NOS DNA QUANT: CPT

## 2025-03-25 PROCEDURE — 83735 ASSAY OF MAGNESIUM: CPT

## 2025-03-25 PROCEDURE — 84100 ASSAY OF PHOSPHORUS: CPT

## 2025-03-26 ENCOUNTER — PATIENT MESSAGE (OUTPATIENT)
Dept: HEMATOLOGY/ONCOLOGY | Facility: CLINIC | Age: 70
End: 2025-03-26

## 2025-03-26 ENCOUNTER — OFFICE VISIT (OUTPATIENT)
Dept: HEMATOLOGY/ONCOLOGY | Facility: CLINIC | Age: 70
End: 2025-03-26
Payer: MEDICARE

## 2025-03-26 VITALS
BODY MASS INDEX: 20.93 KG/M2 | TEMPERATURE: 98 F | SYSTOLIC BLOOD PRESSURE: 98 MMHG | HEART RATE: 91 BPM | HEIGHT: 69 IN | OXYGEN SATURATION: 97 % | DIASTOLIC BLOOD PRESSURE: 54 MMHG | WEIGHT: 141.31 LBS

## 2025-03-26 DIAGNOSIS — R35.0 URINARY FREQUENCY: ICD-10-CM

## 2025-03-26 DIAGNOSIS — D61.818 PANCYTOPENIA: ICD-10-CM

## 2025-03-26 DIAGNOSIS — D46.9 MYELODYSPLASTIC SYNDROME: ICD-10-CM

## 2025-03-26 DIAGNOSIS — G47.00 INSOMNIA, UNSPECIFIED TYPE: ICD-10-CM

## 2025-03-26 DIAGNOSIS — G25.81 RESTLESS LEG SYNDROME: ICD-10-CM

## 2025-03-26 DIAGNOSIS — I99.8 BLOOD PRESSURE INSTABILITY: ICD-10-CM

## 2025-03-26 DIAGNOSIS — D84.822 IMMUNOCOMPROMISED STATE ASSOCIATED WITH STEM CELL TRANSPLANT: ICD-10-CM

## 2025-03-26 DIAGNOSIS — L29.9 PRURITUS: ICD-10-CM

## 2025-03-26 DIAGNOSIS — D89.810 ACUTE GVHD: ICD-10-CM

## 2025-03-26 DIAGNOSIS — D46.9 MYELODYSPLASTIC SYNDROME: Primary | ICD-10-CM

## 2025-03-26 DIAGNOSIS — Z94.81 S/P ALLOGENEIC BONE MARROW TRANSPLANT: ICD-10-CM

## 2025-03-26 DIAGNOSIS — Z94.84 IMMUNOCOMPROMISED STATE ASSOCIATED WITH STEM CELL TRANSPLANT: ICD-10-CM

## 2025-03-26 LAB
CYTOMEGALOVIRUS DNA, QUAL (OHS): NOT DETECTED
EPSTEIN-BARR VIRUS DNA, QUAL (OHS): NORMAL
TACROLIMUS BLD-MCNC: 4.7 NG/ML (ref 5–15)

## 2025-03-26 PROCEDURE — 99999 PR PBB SHADOW E&M-EST. PATIENT-LVL IV: CPT | Mod: PBBFAC,,,

## 2025-03-26 RX ORDER — TACROLIMUS 0.5 MG/1
CAPSULE ORAL
Qty: 90 CAPSULE | Refills: 5 | Status: SHIPPED | OUTPATIENT
Start: 2025-03-26

## 2025-03-26 NOTE — PROGRESS NOTES
BMT Pharmacist Immunosuppression Note:    Reviewed patient's tacrolimus level with Dr. Hickey and it is below goal range. Instructed patient to increase your current regimen of 1 capsule (0.5 mg) in the morning and 1 capsule (0.5mg) in the evening to 2 capsules (1mg) in the morning and 1 capsule (0.5mg) in the evening.       Terese Cantor, PharmD  Clinical Pharmacy Specialist, Bone Marrow Transplant/Hematology  Spectra link: [phone]

## 2025-03-26 NOTE — PROGRESS NOTES
Section of Hematology and Stem Cell Transplantation    Post-Transplantation Follow Up Visit       Notes:    03/26/2025    Transplant History:   Primary oncologist: Paco Hickey MD  Primary oncologic diagnosis: MDS  Transplant date: 9/19/2024  Donor: haploidentical  Blood Type (Patient): B +  Blood Type (Donor): A +  CMV (Patient): Positive  CMV (Donor): Positive  Graft source: Bone marrow  CD34+ cell dose: 3.55x10^6  Conditioning Regimen: Fludarabine plus melphalan 100 mg/m2 + 2Gy TBI  GVHD prophylaxis: Post-transplant cyclophosphamide, Tacrolimus, MMF  Immediate post-transplant complications: Patient experienced expected GI toxicities with C diff negative diarrhea and nausea, neutropenic fever with negative infectious work up, expected cytopenias requiring transfusions, electrolyte abnormalities requiring replacement, volume overload requiring diuresis, intermittent SOB from pulmonary edema requiring 02, and a hemorrhoid flare up. Diarrhea and SOB improved towards the end of the hospital stay.     History of Present Ilness:   Guillaume Salinas (Guillaume) is a pleasant 69 y.o.male with a past medical history of MDS who is status post haploidentical stem cell transplantation conditioned with FluMel 100 + PTCy+ 2Gy TBI who is currently day +188  who presents for post-transplant follow up. His daughter translated today, they denied our  services.     Interval History:   Patient presents for routine follow-up post allogeneic transplant.  He is doing well overall. After physical he has been having pain is his lower back but he believes he has been pushing himself too far. He is hypotensive in clinic today but is not feeling dizzy or weak from it. He is still experiencing frequency but no dysuria. He notes having pruritus on his forehead.     PAST MEDICAL HISTORY:   Past Medical History:   Diagnosis Date    Anticoagulant long-term use     Coronary artery disease     Hypertension     Myelodysplastic  syndrome     Peripheral vascular disease, unspecified        PAST SURGICAL HISTORY:   Past Surgical History:   Procedure Laterality Date    BONE MARROW BIOPSY Left 2023    Procedure: Biopsy-bone marrow;  Surgeon: Harry Diamond MD;  Location: [facility] OR;  Service: Oncology;  Laterality: Left;    COLONOSCOPY N/A 2022    Procedure: COLONOSCOPY Golytely Vaccinated will bring cards;  Surgeon: Dereje Simon MD;  Location: [facility] ENDO;  Service: Endoscopy;  Laterality: N/A;  Do not cancel this order    INSERTION OF LEMONS CATHETER Right 2024    Procedure: INSERTION, CATHETER, CENTRAL VENOUS, LEMONS TRIPLE LUMEN;  Surgeon: Kg Patten MD;  Location: [facility] OR [address] [place] [address];  Service: General;  Laterality: Right;     PAST SOCIAL HISTORY:  Social History     Socioeconomic History    Marital status:    Tobacco Use    Smoking status: Former     Current packs/day: 0.00     Average packs/day: 0.3 packs/day for 50.0 years (12.5 ttl pk-yrs)     Types: Cigarettes     Start date: 3/1/1973     Quit date: 3/1/2023     Years since quittin.0     Passive exposure: Past    Smokeless tobacco: Never   Substance and Sexual Activity    Alcohol use: Not Currently    Drug use: Never    Sexual activity: Not Currently     Partners: Female     Social Drivers of Health     Financial Resource Strain: Patient Declined (2024)    Overall Financial Resource Strain (CARDIA)     Difficulty of Paying Living Expenses: Patient declined   Food Insecurity: Patient Declined (2024)    Hunger Vital Sign     Worried About Running Out of Food in the Last Year: Patient declined     Ran Out of Food in the Last Year: Patient declined   Transportation Needs: Patient Declined (2024)    TRANSPORTATION NEEDS     Transportation : Patient declined   Physical Activity: Inactive (1/10/2025)    Exercise Vital Sign     Days of Exercise per Week: 0 days     Minutes of Exercise per Session: 10 min   Stress: Patient Declined  (12/28/2024)    [employer] - Occupational Stress Questionnaire     Feeling of Stress : Patient declined   Recent Concern: Stress - Stress Concern Present (9/30/2024)    [employer] - Occupational Stress Questionnaire     Feeling of Stress : To some extent   Housing Stability: Patient Declined (12/28/2024)    Housing Stability Vital Sign     Unable to Pay for Housing in the Last Year: Patient declined     Homeless in the Last Year: Patient declined       FAMILY HISTORY:  Cancer-related family history includes Cancer in his brother and father.    CURRENT MEDICATIONS:   Medication List with Changes/Refills   Current Medications    ACYCLOVIR (ZOVIRAX) 800 MG TAB    Take 1 tablet (800 mg total) by mouth 2 (two) times daily.    ATOVAQUONE (MEPRON) 750 MG/5 ML SUSP ORAL LIQUID    Take 10 mLs (1,500 mg total) by mouth once daily.    BUDESONIDE (ENTOCORT EC) 3 MG CAPSULE    Take 1 capsule (3 mg total) by mouth 3 (three) times daily.    CARVEDILOL (COREG) 3.125 MG TABLET    Take 1 tablet (3.125 mg total) by mouth 2 (two) times daily.    CYANOCOBALAMIN 1,000 MCG/ML INJECTION    Inject 1 mL (1,000 mcg total) into the muscle once a week.    ELTROMBOPAG OLAMINE (PROMACTA) 50 MG TAB    Take 1 tablet (50 mg total) by mouth once daily.    FOLIC ACID (FOLVITE) 1 MG TABLET    Take 1 tablet (1 mg total) by mouth once daily.    GABAPENTIN (NEURONTIN) 300 MG CAPSULE    Take 2 capsules (600 mg total) by mouth nightly as needed (Restless leg).    HYDROCORTISONE 1 % CREAM    Apply to affected area 2 times daily    LETERMOVIR (PREVYMIS) 480 MG TAB    Take 1 tablet (480 mg total) by mouth Daily.    LEVOFLOXACIN (LEVAQUIN) 500 MG TABLET    Take 1 tablet (500 mg total) by mouth once daily.    LOPERAMIDE (IMODIUM A-D) 2 MG TAB    Take 2 mg by mouth 4 (four) times daily as needed.    MAGNESIUM OXIDE (MAG-OX) 400 MG (241.3 MG MAGNESIUM) TABLET    Take 2 tablets (800 mg total) by mouth 2  (two) times daily.    ONDANSETRON (ZOFRAN-ODT) 8 MG TBDL    Take 1 tablet (8 mg total) by mouth every 8 (eight) hours as needed (nausea).    PANTOPRAZOLE (PROTONIX) 40 MG TABLET    Take 1 tablet (40 mg total) by mouth once daily.    POSACONAZOLE (NOXAFIL) 100 MG TBEC TABLET    Take 3 tablets (300 mg total) by mouth once daily.    POSACONAZOLE (NOXAFIL) 100 MG TBEC TABLET    Take 3 tablets (300 mg total) by mouth once daily.    ROPINIROLE (REQUIP) 0.5 MG TABLET    Take 1 tablet (0.5 mg total) by mouth every evening.    TACROLIMUS (PROGRAF) 0.5 MG CAP    Take 2 capsules (1 mg total) by mouth every morning AND 1 capsule (0.5 mg total) every evening.    ZOLPIDEM (AMBIEN) 5 MG TAB    Take 1 tablet (5 mg total) by mouth nightly as needed (insomnia).       ALLERGIES:   Review of patient's allergies indicates:  No Known Allergies    GVHD Review of Systems:     Pertinent positives and negatives included in the HPI. Otherwise a 14 point review of systems is negative. GVHD review of systems recorded in BMT flowsheet.     Physical Exam:     Vitals:    03/26/25 1443   BP: (!) 98/54   Pulse: 91   Temp: 98.2 °F (36.8 °C)         General: Appears well, NAD.   HEENT: MMM, no OP lesions  Pulmonary: CTAB, no increased work of breathing, no W/R/C  Cardiovascular: S1S2 normal, RRR, no M/R/G  Abdominal: Soft, NT, ND, BS+, no HSM  Extremities: No C/C/E  Neurological: AAOx4, grossly normal, no focal deficits  Dermatologic: GVHD face rash improving but still present. Dry/peeling skin noted to patient's forehead      ECOG Performance Status: (foot note - ECOG PS provided by Eastern Cooperative Oncology Group) 1 - Symptomatic but completely ambulatory    Karnofsky Performance Score:  80%- Normal Activity with Effort: Some Symptoms of Disease    Labs:   Lab Results   Component Value Date    WBC 2.14 (L) 03/24/2025    HGB 10.9 (L) 03/24/2025    HCT 32.7 (L) 03/24/2025     (H) 03/24/2025    PLT 36 (LL) 03/24/2025        CMP  Sodium   Date  Value Ref Range Status   03/25/2025 138 136 - 145 mmol/L Final   03/17/2025 140 136 - 145 mmol/L Final     Potassium   Date Value Ref Range Status   03/25/2025 4.2 3.5 - 5.1 mmol/L Final   03/17/2025 4.9 3.5 - 5.1 mmol/L Final     Chloride   Date Value Ref Range Status   03/25/2025 105 95 - 110 mmol/L Final   03/17/2025 105 95 - 110 mmol/L Final     CO2   Date Value Ref Range Status   03/25/2025 22 (L) 23 - 29 mmol/L Final   03/17/2025 26 23 - 29 mmol/L Final     Glucose   Date Value Ref Range Status   03/17/2025 109 70 - 110 mg/dL Final     BUN   Date Value Ref Range Status   03/25/2025 15 8 - 23 mg/dL Final     Creatinine   Date Value Ref Range Status   03/25/2025 1.3 0.5 - 1.4 mg/dL Final     Calcium   Date Value Ref Range Status   03/25/2025 9.0 8.7 - 10.5 mg/dL Final   03/17/2025 9.3 8.7 - 10.5 mg/dL Final     Total Protein   Date Value Ref Range Status   03/17/2025 6.1 6.0 - 8.4 g/dL Final     Albumin   Date Value Ref Range Status   03/25/2025 3.5 3.5 - 5.2 g/dL Final   03/17/2025 3.5 3.5 - 5.2 g/dL Final     Total Bilirubin   Date Value Ref Range Status   03/17/2025 0.5 0.1 - 1.0 mg/dL Final     Comment:     For infants and newborns, interpretation of results should be based  on gestational age, weight and in agreement with clinical  observations.    Premature Infant recommended reference ranges:  Up to 24 hours.............<8.0 mg/dL  Up to 48 hours............<12.0 mg/dL  3-5 days..................<15.0 mg/dL  6-29 days.................<15.0 mg/dL       Bilirubin Total   Date Value Ref Range Status   03/25/2025 0.4 0.1 - 1.0 mg/dL Final     Comment:     For infants and newborns, interpretation of results should be based   on gestational age, weight and in agreement with clinical   observations.    Premature Infant recommended reference ranges:   0-24 hours:  <8.0 mg/dL   24-48 hours: <12.0 mg/dL   3-5 days:    <15.0 mg/dL   6-29 days:   <15.0 mg/dL     Alkaline Phosphatase   Date Value Ref Range Status    03/17/2025 66 40 - 150 U/L Final     ALP   Date Value Ref Range Status   03/25/2025 75 40 - 150 unit/L Final     AST   Date Value Ref Range Status   03/25/2025 29 11 - 45 unit/L Final   03/17/2025 31 10 - 40 U/L Final     ALT   Date Value Ref Range Status   03/25/2025 27 10 - 44 unit/L Final   03/17/2025 35 10 - 44 U/L Final     Anion Gap   Date Value Ref Range Status   03/25/2025 11 8 - 16 mmol/L Final     eGFR   Date Value Ref Range Status   03/25/2025 59 (L) >60 mL/min/1.73/m2 Final     Comment:     Estimated GFR calculated using the CKD-EPI creatinine (2021) equation.   03/17/2025 >60 >60 mL/min/1.73 m^2 Final          Imaging:   Hospital imaging reviewed.    Pathology:  Prior pathology reviewed.   Acute GVHD Scoring:  GVHD Acute Assessment- active skin gvhd grade 1               Assessment and Plan:   Guillaume Salinas (Guillaume) is a pleasant 69 y.o.male with a past medical history of MDS s/p haplo SCT who presents for post-transplant follow up.    MDS: Status post treatment with azacitidine 75 mg/m2 daily x7 days plus venetoclax 100mg (voriconazole) daily x14 of 28 days.Venetoclax decreased to 7 days with cycle 3 until completion of 11 cycles. No plans for maintenance at this time.     Status post allogeneic stem cell transplantation: As noted above, status post haploidentical stem cell transplantation conditioned with FluMel 100 + PTCy+ 2Gy TBI . Currently Day+ 188. Engrafted on 10/09/24 day +20.  Day 30 bone marrow (11/5/2024) showing mildly hypercellular marrow with no increased blast (0.3%) and no evidence of myeloid neoplasm. Pending chimerisms, NGS, and CG  Day 100 bone marrow biopsy on 12/31/24 showed 30-40% cellularity, erythroid hyperplasia, dyserythropoiesis, decreased megakaryocytes with atypical morphology. No hemophagocytosis mentioned. NGS, FISH, and CG normal. 100% chimerisms.     3.    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 He started budesonide 3mg TID on 10/28/24. Due to persistent nausea despite budesonide so started systemic steroids 11/1 at 0.5 mg/kg and his steroid taper has been given to him. Steroid taper completed 12/8/24. No evidence of aGVHD today. 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 Taper completed. Patient's daughter sent messages through the portal about his nausea coming back with no improvement from zofran so budesonide 3mg TID was sent in on 2/6/25. Budesonide taper ended 3/13/25. Can consider tapering off tacrolimus in next few weeks if GVHD does not return. Skin gvhd present on face with erythema, inflammation, and sand paper feel on 3/14/25.   A. Face rash continues to improve but still present- continue hydrocortisone cream BID  B. Current tacro dose: 0.5 mg BID  C. Last tacro level: 4.7  D. Adjustments: 1mg qAM and 0.5mg qPM    4.     Immunosuppression: Prevention with posaconazole, acyclovir. CMV prophylaxis with letermovir. PJP prophyalxis atovaquone. Continue weekly monitoring of CMV and EBV.  Last CMV: Not-detected,   last EBV:up trending at 223  Active infections: N/A    Pancytopenia: Due to underlying disease and chemotherapy. Transfuse for Hgb <7 g/dL and platelets <10k. Folate and copper deficient, on supplementation. Will continue to monitor to see if platelets begin to rise with his supplements. Promacta sent in on 12/9/24. Change TMP/SMX to atovaquone. Continue promacta and supplements listed below  Folic Acid deficiency: Continue folic acid 1mg daily  Copper deficiency: Copper level of 635, continue copper gluconate 2mg daily   B12 deficiency: B12 injections sent in today. Patient wants to come into clinic for his injections. 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 Now EBV has improved.   EBV negative     Hypomagnesemia: Related to tacro, poor po intake. Continue MagOx to 800mg twice daily.      Chemotherapy Induced Nausea: Resolved with steroids and the taper originally ended 12/8/24. He ended up having nausea again while hospitalized and was again started on steroid and taper ended 1/28/25. Patient's daughter sent messages through the portal about his nausea coming back with no improvement from zofran so budesonide 3mg TID was sent in on 2/6/25. Budesonide taper ended 3/13/25. No longer having nausea.      Anxiety, Restless Leg Syndrome: Noted since discharge by family. He started ropinirole 0.5mg and has experienced significant improvement.  Symptoms have resolved. Continue Ropinirole    Insomnia: Patient now sleeping well and only waking up to urinate at night. Continue Ambien and Ropinirole for RLS.      Blood pressure abnormalities: For awhile patient had been holding off of his carvedilol due to hypotensions and dizziness. His pressures at home have been averaging 170s/90s so he started the carvedilol 6.25mg BID again. He began to complain of orthostatic hypotension symptoms when he takes the medication and BP in clinic is 105/55. His carvedilol was lowered to 3.125 daily. Today  is blood pressure is slightly hypotensive but they say it has been normal at home. Reminded them to take blood pressure twice a day and enter it into [facility] . For now continue carvedilol 3.125mg BID and will monitor and make adjustments as needed.    Facial pruritus: Patient notes pruritus mainly to his forehead. Upon exam he has dry skin that is peeling which could be from the chemotherapy from the stem cell transplant or from topical hydrocortisone cream. Advised patient to use a moisturizer like cerave to help his dry skin.    13. Urinary  Frequency: Patient has been experience urinary frequency for many weeks now but denies dysuria and hematuria. Urinalysis last visit was negative. Referral for urology was offered but patient declined for now.       Orders Placed:             Follow Up: Every other week with weekly labs     BMT Route Chart for Scheduling     A total of 35 minutes was spent in pre-visit chart review, personal interpretation of labs and imaging, and medication review. Total visit time 35 minutes, >50 % counseling.       Gilma Ugalde PA-C  Malignant Hematology, Stem Cell Transplant, and Cellular Therapy  [facility]  [facility]

## 2025-03-31 ENCOUNTER — LAB VISIT (OUTPATIENT)
Dept: LAB | Facility: HOSPITAL | Age: 70
End: 2025-03-31
Attending: FAMILY MEDICINE
Payer: MEDICARE

## 2025-03-31 ENCOUNTER — TELEPHONE (OUTPATIENT)
Dept: HEMATOLOGY/ONCOLOGY | Facility: CLINIC | Age: 70
End: 2025-03-31
Payer: MEDICARE

## 2025-03-31 DIAGNOSIS — R11.0 NAUSEA: ICD-10-CM

## 2025-03-31 DIAGNOSIS — Z76.82 STEM CELL TRANSPLANT CANDIDATE: ICD-10-CM

## 2025-03-31 DIAGNOSIS — D46.9 MYELODYSPLASTIC SYNDROME: ICD-10-CM

## 2025-03-31 LAB
ABSOLUTE NEUTROPHIL MANUAL (OHS): 1.2 K/UL
ALBUMIN SERPL BCP-MCNC: 3.2 G/DL (ref 3.5–5.2)
ALP SERPL-CCNC: 61 UNIT/L (ref 40–150)
ALT SERPL W/O P-5'-P-CCNC: 27 UNIT/L (ref 10–44)
ANION GAP (OHS): 7 MMOL/L (ref 8–16)
AST SERPL-CCNC: 28 UNIT/L (ref 11–45)
BILIRUB SERPL-MCNC: 0.4 MG/DL (ref 0.1–1)
BUN SERPL-MCNC: 13 MG/DL (ref 8–23)
CALCIUM SERPL-MCNC: 8.9 MG/DL (ref 8.7–10.5)
CHLORIDE SERPL-SCNC: 107 MMOL/L (ref 95–110)
CO2 SERPL-SCNC: 26 MMOL/L (ref 23–29)
CREAT SERPL-MCNC: 1.3 MG/DL (ref 0.5–1.4)
DACRYOCYTES BLD QL SMEAR: ABNORMAL
EOSINOPHIL NFR BLD MANUAL: 3 % (ref 0–8)
ERYTHROCYTE [DISTWIDTH] IN BLOOD BY AUTOMATED COUNT: 14.2 % (ref 11.5–14.5)
GFR SERPLBLD CREATININE-BSD FMLA CKD-EPI: 59 ML/MIN/1.73/M2
GLUCOSE SERPL-MCNC: 124 MG/DL (ref 70–110)
HCT VFR BLD AUTO: 29.7 % (ref 40–54)
HGB BLD-MCNC: 9.9 GM/DL (ref 14–18)
HYPOCHROMIA BLD QL SMEAR: ABNORMAL
INDIRECT COOMBS: NORMAL
LDH SERPL-CCNC: 203 U/L (ref 110–260)
LYMPHOCYTES NFR BLD MANUAL: 27 % (ref 18–48)
MAGNESIUM SERPL-MCNC: 1.7 MG/DL (ref 1.6–2.6)
MCH RBC QN AUTO: 38.5 PG (ref 27–31)
MCHC RBC AUTO-ENTMCNC: 33.3 G/DL (ref 32–36)
MCV RBC AUTO: 116 FL (ref 82–98)
MONOCYTES NFR BLD MANUAL: 7 % (ref 4–15)
NEUTROPHILS NFR BLD MANUAL: 63 % (ref 38–73)
NUCLEATED RBC (/100WBC) (OHS): 0 /100 WBC
PHOSPHATE SERPL-MCNC: 3.4 MG/DL (ref 2.7–4.5)
PLATELET # BLD AUTO: 30 K/UL (ref 150–450)
PLATELET BLD QL SMEAR: ABNORMAL
PMV BLD AUTO: 12.4 FL (ref 9.2–12.9)
POIKILOCYTOSIS BLD QL SMEAR: SLIGHT
POTASSIUM SERPL-SCNC: 4.5 MMOL/L (ref 3.5–5.1)
PROT SERPL-MCNC: 5.3 GM/DL (ref 6–8.4)
RBC # BLD AUTO: 2.57 M/UL (ref 4.6–6.2)
RH BLD: NORMAL
SODIUM SERPL-SCNC: 140 MMOL/L (ref 136–145)
SPECIMEN OUTDATE: NORMAL
URATE SERPL-MCNC: 5.7 MG/DL (ref 3.4–7)
WBC # BLD AUTO: 1.97 K/UL (ref 3.9–12.7)

## 2025-03-31 PROCEDURE — 83615 LACTATE (LD) (LDH) ENZYME: CPT

## 2025-03-31 PROCEDURE — 85025 COMPLETE CBC W/AUTO DIFF WBC: CPT

## 2025-03-31 PROCEDURE — 80053 COMPREHEN METABOLIC PANEL: CPT

## 2025-03-31 PROCEDURE — 84550 ASSAY OF BLOOD/URIC ACID: CPT

## 2025-03-31 PROCEDURE — 86900 BLOOD TYPING SEROLOGIC ABO: CPT | Performed by: INTERNAL MEDICINE

## 2025-03-31 PROCEDURE — 80197 ASSAY OF TACROLIMUS: CPT

## 2025-03-31 PROCEDURE — 87799 DETECT AGENT NOS DNA QUANT: CPT

## 2025-03-31 PROCEDURE — 83735 ASSAY OF MAGNESIUM: CPT

## 2025-03-31 PROCEDURE — 36415 COLL VENOUS BLD VENIPUNCTURE: CPT

## 2025-03-31 PROCEDURE — 84100 ASSAY OF PHOSPHORUS: CPT

## 2025-03-31 RX ORDER — ONDANSETRON 8 MG/1
8 TABLET, ORALLY DISINTEGRATING ORAL EVERY 8 HOURS PRN
Qty: 30 TABLET | Refills: 1 | Status: SHIPPED | OUTPATIENT
Start: 2025-03-31

## 2025-04-01 ENCOUNTER — OFFICE VISIT (OUTPATIENT)
Dept: HEMATOLOGY/ONCOLOGY | Facility: CLINIC | Age: 70
End: 2025-04-01
Payer: MEDICARE

## 2025-04-01 ENCOUNTER — PATIENT MESSAGE (OUTPATIENT)
Dept: HEMATOLOGY/ONCOLOGY | Facility: CLINIC | Age: 70
End: 2025-04-01

## 2025-04-01 VITALS
OXYGEN SATURATION: 98 % | SYSTOLIC BLOOD PRESSURE: 125 MMHG | BODY MASS INDEX: 21 KG/M2 | DIASTOLIC BLOOD PRESSURE: 74 MMHG | TEMPERATURE: 99 F | HEART RATE: 93 BPM | WEIGHT: 141.75 LBS | HEIGHT: 69 IN | RESPIRATION RATE: 18 BRPM

## 2025-04-01 DIAGNOSIS — D89.810 ACUTE GVHD: ICD-10-CM

## 2025-04-01 DIAGNOSIS — L29.9 PRURITUS: ICD-10-CM

## 2025-04-01 DIAGNOSIS — D61.818 PANCYTOPENIA: ICD-10-CM

## 2025-04-01 DIAGNOSIS — R35.0 URINARY FREQUENCY: ICD-10-CM

## 2025-04-01 DIAGNOSIS — G47.00 INSOMNIA, UNSPECIFIED TYPE: ICD-10-CM

## 2025-04-01 DIAGNOSIS — Z94.81 S/P ALLOGENEIC BONE MARROW TRANSPLANT: Primary | ICD-10-CM

## 2025-04-01 DIAGNOSIS — D46.9 MYELODYSPLASTIC SYNDROME: ICD-10-CM

## 2025-04-01 DIAGNOSIS — T45.1X5A CHEMOTHERAPY-INDUCED NAUSEA: ICD-10-CM

## 2025-04-01 DIAGNOSIS — D84.822 IMMUNOCOMPROMISED STATE ASSOCIATED WITH STEM CELL TRANSPLANT: ICD-10-CM

## 2025-04-01 DIAGNOSIS — G25.81 RESTLESS LEG SYNDROME: ICD-10-CM

## 2025-04-01 DIAGNOSIS — Z94.84 IMMUNOCOMPROMISED STATE ASSOCIATED WITH STEM CELL TRANSPLANT: ICD-10-CM

## 2025-04-01 DIAGNOSIS — R11.0 CHEMOTHERAPY-INDUCED NAUSEA: ICD-10-CM

## 2025-04-01 DIAGNOSIS — R11.0 NAUSEA: ICD-10-CM

## 2025-04-01 LAB
CYTOMEGALOVIRUS DNA, QUAL (OHS): NOT DETECTED
EPSTEIN-BARR VIRUS DNA, QUAL (OHS): NORMAL
TACROLIMUS BLD-MCNC: 10.4 NG/ML (ref 5–15)

## 2025-04-01 PROCEDURE — 99999 PR PBB SHADOW E&M-EST. PATIENT-LVL IV: CPT | Mod: PBBFAC,,,

## 2025-04-01 RX ORDER — ONDANSETRON 8 MG/1
8 TABLET, ORALLY DISINTEGRATING ORAL EVERY 8 HOURS PRN
Qty: 90 TABLET | Refills: 11 | Status: SHIPPED | OUTPATIENT
Start: 2025-04-01

## 2025-04-01 NOTE — PROGRESS NOTES
BMT Pharmacist Immunosuppression Note:    Reviewed patient's tacrolimus level with Dr. Hickey and it is within goal range.      Lab Results   Component Value Date    TACROLIMUS 10.4 03/31/2025    TACROLIMUS 4.7 (L) 03/25/2025    TACROLIMUS 16.1 (H) 03/17/2025       Lab Results   Component Value Date    CREATININE 1.3 03/31/2025    CREATININE 1.3 03/25/2025    CREATININE 1.1 03/17/2025    BILITOT 0.4 03/31/2025    BILITOT 0.4 03/25/2025    BILITOT 0.5 03/17/2025    AST 28 03/31/2025    AST 29 03/25/2025    AST 31 03/17/2025    ALT 27 03/31/2025    ALT 27 03/25/2025    ALT 35 03/17/2025         Current regimen: 1.5 mg in the morning and 0.5 mg in the evening.     Plan: Continue current regimen.        Terese Cantor, PharmD  Clinical Pharmacy Specialist, Bone Marrow Transplant/Hematology  Spectra link: [phone]

## 2025-04-01 NOTE — PROGRESS NOTES
Section of Hematology and Stem Cell Transplantation    Post-Transplantation Follow Up Visit       Notes:    04/02/2025    Transplant History:   Primary oncologist: Paco Hickey MD  Primary oncologic diagnosis: MDS  Transplant date: 9/19/2024  Donor: haploidentical  Blood Type (Patient): B +  Blood Type (Donor): A +  CMV (Patient): Positive  CMV (Donor): Positive  Graft source: Bone marrow  CD34+ cell dose: 3.55x10^6  Conditioning Regimen: Fludarabine plus melphalan 100 mg/m2 + 2Gy TBI  GVHD prophylaxis: Post-transplant cyclophosphamide, Tacrolimus, MMF  Immediate post-transplant complications: Patient experienced expected GI toxicities with C diff negative diarrhea and nausea, neutropenic fever with negative infectious work up, expected cytopenias requiring transfusions, electrolyte abnormalities requiring replacement, volume overload requiring diuresis, intermittent SOB from pulmonary edema requiring 02, and a hemorrhoid flare up. Diarrhea and SOB improved towards the end of the hospital stay.     History of Present Ilness:   Guillaume Salinas (Guillaume) is a pleasant 69 y.o.male with a past medical history of MDS who is status post haploidentical stem cell transplantation conditioned with FluMel 100 + PTCy+ 2Gy TBI who is currently day +195  who presents for post-transplant follow up. His daughter translated today, they denied our  services.     Interval History:   Patient presents for routine follow-up post allogeneic transplant.  This past weekend he started having loss of appetite and being very fatigued. He did throw up very little this morning. No nausea over the weekend, only today.  No diarrhea. His face is still red and pruritic.     PAST MEDICAL HISTORY:   Past Medical History:   Diagnosis Date    Anticoagulant long-term use     Coronary artery disease     Hypertension     Myelodysplastic syndrome     Peripheral vascular disease, unspecified        PAST SURGICAL HISTORY:   Past Surgical  History:   Procedure Laterality Date    BONE MARROW BIOPSY Left 2023    Procedure: Biopsy-bone marrow;  Surgeon: Harry Diamond MD;  Location: [facility] OR;  Service: Oncology;  Laterality: Left;    COLONOSCOPY N/A 2022    Procedure: COLONOSCOPY Golytely Vaccinated will bring cards;  Surgeon: Dereje Simon MD;  Location: [facility] ENDO;  Service: Endoscopy;  Laterality: N/A;  Do not cancel this order    INSERTION OF LEMONS CATHETER Right 2024    Procedure: INSERTION, CATHETER, CENTRAL VENOUS, LEMONS TRIPLE LUMEN;  Surgeon: Kg Patetn MD;  Location: [address];  Service: General;  Laterality: Right;     PAST SOCIAL HISTORY:  Social History     Socioeconomic History    Marital status:    Tobacco Use    Smoking status: Former     Current packs/day: 0.00     Average packs/day: 0.3 packs/day for 50.0 years (12.5 ttl pk-yrs)     Types: Cigarettes     Start date: 3/1/1973     Quit date: 3/1/2023     Years since quittin.0     Passive exposure: Past    Smokeless tobacco: Never   Substance and Sexual Activity    Alcohol use: Not Currently    Drug use: Never    Sexual activity: Not Currently     Partners: Female     Social Drivers of Health     Financial Resource Strain: Patient Declined (2024)    Overall Financial Resource Strain (CARDIA)     Difficulty of Paying Living Expenses: Patient declined   Food Insecurity: Patient Declined (2024)    Hunger Vital Sign     Worried About Running Out of Food in the Last Year: Patient declined     Ran Out of Food in the Last Year: Patient declined   Transportation Needs: Patient Declined (2024)    TRANSPORTATION NEEDS     Transportation : Patient declined   Physical Activity: Inactive (1/10/2025)    Exercise Vital Sign     Days of Exercise per Week: 0 days     Minutes of Exercise per Session: 10 min   Stress: Patient Declined (2024)    [employer] [employer] - Occupational Stress Questionnaire      Feeling of Stress : Patient declined   Recent Concern: Stress - Stress Concern Present (9/30/2024)    [employer] - Occupational Stress Questionnaire     Feeling of Stress : To some extent   Housing Stability: Patient Declined (12/28/2024)    Housing Stability Vital Sign     Unable to Pay for Housing in the Last Year: Patient declined     Homeless in the Last Year: Patient declined       FAMILY HISTORY:  Cancer-related family history includes Cancer in his brother and father.    CURRENT MEDICATIONS:   Medication List with Changes/Refills   Current Medications    ACYCLOVIR (ZOVIRAX) 800 MG TAB    Take 1 tablet (800 mg total) by mouth 2 (two) times daily.    ATOVAQUONE (MEPRON) 750 MG/5 ML SUSP ORAL LIQUID    Take 10 mLs (1,500 mg total) by mouth once daily.    BUDESONIDE (ENTOCORT EC) 3 MG CAPSULE    Take 1 capsule (3 mg total) by mouth 3 (three) times daily.    CARVEDILOL (COREG) 3.125 MG TABLET    Take 1 tablet (3.125 mg total) by mouth 2 (two) times daily.    CYANOCOBALAMIN 1,000 MCG/ML INJECTION    Inject 1 mL (1,000 mcg total) into the muscle once a week.    ELTROMBOPAG OLAMINE (PROMACTA) 50 MG TAB    Take 1 tablet (50 mg total) by mouth once daily.    FOLIC ACID (FOLVITE) 1 MG TABLET    Take 1 tablet (1 mg total) by mouth once daily.    GABAPENTIN (NEURONTIN) 300 MG CAPSULE    Take 2 capsules (600 mg total) by mouth nightly as needed (Restless leg).    HYDROCORTISONE 1 % CREAM    Apply to affected area 2 times daily    LETERMOVIR (PREVYMIS) 480 MG TAB    Take 1 tablet (480 mg total) by mouth Daily.    LEVOFLOXACIN (LEVAQUIN) 500 MG TABLET    Take 1 tablet (500 mg total) by mouth once daily.    LOPERAMIDE (IMODIUM A-D) 2 MG TAB    Take 2 mg by mouth 4 (four) times daily as needed.    MAGNESIUM OXIDE (MAG-OX) 400 MG (241.3 MG MAGNESIUM) TABLET    Take 2 tablets (800 mg total) by mouth 2 (two) times daily.    ONDANSETRON (ZOFRAN-ODT) 8 MG TBDL    Take 1 tablet (8 mg total) by mouth  every 8 (eight) hours as needed (nausea).    PANTOPRAZOLE (PROTONIX) 40 MG TABLET    Take 1 tablet (40 mg total) by mouth once daily.    POSACONAZOLE (NOXAFIL) 100 MG TBEC TABLET    Take 3 tablets (300 mg total) by mouth once daily.    POSACONAZOLE (NOXAFIL) 100 MG TBEC TABLET    Take 3 tablets (300 mg total) by mouth once daily.    ROPINIROLE (REQUIP) 0.5 MG TABLET    Take 1 tablet (0.5 mg total) by mouth every evening.    TACROLIMUS (PROGRAF) 0.5 MG CAP    Take 2 capsules (1 mg total) by mouth every morning AND 1 capsule (0.5 mg total) every evening.    ZOLPIDEM (AMBIEN) 5 MG TAB    Take 1 tablet (5 mg total) by mouth nightly as needed (insomnia).   Changed and/or Refilled Medications    Modified Medication Previous Medication    ONDANSETRON (ZOFRAN-ODT) 8 MG TBDL ondansetron (ZOFRAN-ODT) 8 MG TbDL       Take 1 tablet (8 mg total) by mouth every 8 (eight) hours as needed (nausea/vomiting).    Take 1 tablet (8 mg total) by mouth every 8 (eight) hours as needed (nausea/vomiting).       ALLERGIES:   Review of patient's allergies indicates:  No Known Allergies    GVHD Review of Systems:     Pertinent positives and negatives included in the HPI. Otherwise a 14 point review of systems is negative. GVHD review of systems recorded in BMT flowsheet.     Physical Exam:     Vitals:    04/01/25 1039   BP: 125/74   Pulse: 93   Resp: 18   Temp: 98.5 °F (36.9 °C)         General: Appears well, NAD.   HEENT: MMM, no OP lesions  Pulmonary: CTAB, no increased work of breathing, no W/R/C  Cardiovascular: S1S2 normal, RRR, no M/R/G  Abdominal: Soft, NT, ND, BS+, no HSM  Extremities: No C/C/E  Neurological: AAOx4, grossly normal, no focal deficits  Dermatologic: GVHD face rash improving but still present. Dry/peeling skin noted to patient's forehead      ECOG Performance Status: (foot note - ECOG PS provided by Eastern Cooperative Oncology Group) 1 - Symptomatic but completely ambulatory    Karnofsky Performance Score:  80%- Normal  Activity with Effort: Some Symptoms of Disease    Labs:   Lab Results   Component Value Date    WBC 1.97 (LL) 03/31/2025    HGB 9.9 (L) 03/31/2025    HCT 29.7 (L) 03/31/2025     (H) 03/31/2025    PLT 30 (LL) 03/31/2025        CMP  Sodium   Date Value Ref Range Status   03/31/2025 140 136 - 145 mmol/L Final   03/17/2025 140 136 - 145 mmol/L Final     Potassium   Date Value Ref Range Status   03/31/2025 4.5 3.5 - 5.1 mmol/L Final   03/17/2025 4.9 3.5 - 5.1 mmol/L Final     Chloride   Date Value Ref Range Status   03/31/2025 107 95 - 110 mmol/L Final   03/17/2025 105 95 - 110 mmol/L Final     CO2   Date Value Ref Range Status   03/31/2025 26 23 - 29 mmol/L Final   03/17/2025 26 23 - 29 mmol/L Final     Glucose   Date Value Ref Range Status   03/17/2025 109 70 - 110 mg/dL Final     BUN   Date Value Ref Range Status   03/31/2025 13 8 - 23 mg/dL Final     Creatinine   Date Value Ref Range Status   03/31/2025 1.3 0.5 - 1.4 mg/dL Final     Calcium   Date Value Ref Range Status   03/31/2025 8.9 8.7 - 10.5 mg/dL Final   03/17/2025 9.3 8.7 - 10.5 mg/dL Final     Total Protein   Date Value Ref Range Status   03/17/2025 6.1 6.0 - 8.4 g/dL Final     Albumin   Date Value Ref Range Status   03/31/2025 3.2 (L) 3.5 - 5.2 g/dL Final   03/17/2025 3.5 3.5 - 5.2 g/dL Final     Total Bilirubin   Date Value Ref Range Status   03/17/2025 0.5 0.1 - 1.0 mg/dL Final     Comment:     For infants and newborns, interpretation of results should be based  on gestational age, weight and in agreement with clinical  observations.    Premature Infant recommended reference ranges:  Up to 24 hours.............<8.0 mg/dL  Up to 48 hours............<12.0 mg/dL  3-5 days..................<15.0 mg/dL  6-29 days.................<15.0 mg/dL       Bilirubin Total   Date Value Ref Range Status   03/31/2025 0.4 0.1 - 1.0 mg/dL Final     Comment:     For infants and newborns, interpretation of results should be based   on gestational age, weight and in  agreement with clinical   observations.    Premature Infant recommended reference ranges:   0-24 hours:  <8.0 mg/dL   24-48 hours: <12.0 mg/dL   3-5 days:    <15.0 mg/dL   6-29 days:   <15.0 mg/dL     Alkaline Phosphatase   Date Value Ref Range Status   03/17/2025 66 40 - 150 U/L Final     ALP   Date Value Ref Range Status   03/31/2025 61 40 - 150 unit/L Final     AST   Date Value Ref Range Status   03/31/2025 28 11 - 45 unit/L Final   03/17/2025 31 10 - 40 U/L Final     ALT   Date Value Ref Range Status   03/31/2025 27 10 - 44 unit/L Final   03/17/2025 35 10 - 44 U/L Final     Anion Gap   Date Value Ref Range Status   03/31/2025 7 (L) 8 - 16 mmol/L Final     eGFR   Date Value Ref Range Status   03/31/2025 59 (L) >60 mL/min/1.73/m2 Final     Comment:     Estimated GFR calculated using the CKD-EPI creatinine (2021) equation.   03/17/2025 >60 >60 mL/min/1.73 m^2 Final          Imaging:   Hospital imaging reviewed.    Pathology:  Prior pathology reviewed.   Acute GVHD Scoring:  GVHD Acute Assessment- active skin gvhd grade 1               Assessment and Plan:   Guillaume Salinas (Guillaume) is a pleasant 69 y.o.male with a past medical history of MDS s/p haplo SCT who presents for post-transplant follow up.    MDS: Status post treatment with azacitidine 75 mg/m2 daily x7 days plus venetoclax 100mg (voriconazole) daily x14 of 28 days.Venetoclax decreased to 7 days with cycle 3 until completion of 11 cycles. No plans for maintenance at this time.     Status post allogeneic stem cell transplantation: As noted above, status post haploidentical stem cell transplantation conditioned with FluMel 100 + PTCy+ 2Gy TBI . Currently Day+ 195. Engrafted on 10/09/24 day +20.  Day 30 bone marrow (11/5/2024) showing mildly hypercellular marrow with no increased blast (0.3%) and no evidence of myeloid neoplasm. Pending chimerisms, NGS, and CG  Day 100 bone marrow biopsy on 12/31/24 showed 30-40% cellularity, erythroid hyperplasia,  dyserythropoiesis, decreased megakaryocytes with atypical morphology. No hemophagocytosis mentioned. NGS, FISH, and CG normal. 100% chimerisms.     3.    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 He started budesonide 3mg TID on 10/28/24. Due to persistent nausea despite budesonide so started systemic steroids 11/1 at 0.5 mg/kg and his steroid taper has been given to him. Steroid taper completed 12/8/24. No evidence of aGVHD today. 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 Taper completed. Patient's daughter sent messages through the portal about his nausea coming back with no improvement from zofran so budesonide 3mg TID was sent in on 2/6/25. Budesonide taper ended 3/13/25. Can consider tapering off tacrolimus in next few weeks if GVHD does not return. Skin gvhd present on face with erythema, inflammation, and sand paper feel on 3/14/25.   A. Face rash still present- continue hydrocortisone cream BID. Can consider oral steroids in the future. Patient with one episode of vomiting this morning but no frequent/persistent nausea and vomiting. Will monitor for now; not likely GVHD with how infrequently this happened. Zofran refilled.   B. Current tacro dose: : 1mg qAM and 0.5mg qPM  C. Last tacro level: 10.4  D. Adjustments: none    4.     Immunosuppression: Prevention with posaconazole, acyclovir. CMV prophylaxis with letermovir. PJP prophyalxis atovaquone. Continue weekly monitoring of CMV and EBV.  Last CMV: Not-detected,   last EBV:up trending at 223  Active infections: N/A    Pancytopenia: Due to underlying disease and chemotherapy. Transfuse for Hgb <7 g/dL and platelets <10k. Folate and copper deficient, on supplementation. Will continue to monitor to see if platelets  begin to rise with his supplements. Promacta sent in on 12/9/24. Change TMP/SMX to atovaquone. Continue promacta and supplements listed below  Folic Acid deficiency: Continue folic acid 1mg daily  Copper deficiency: Copper level of 635, continue copper gluconate 2mg daily   B12 deficiency: B12 injections sent in today. Patient wants to come into clinic for his injections. Plan to do weekly injections for the first month and then monthly after    Post transplant lymphoproliferative disorder: Patient received rituxan on 12/30/24 due to possible PTLD with elevated EBV and possible HLH. Now EBV has improved.   EBV negative     Hypomagnesemia: Continue MagOx to 800mg twice daily.      Chemotherapy Induced Nausea: Resolved with steroids and the taper originally ended 12/8/24. He ended up having nausea again while hospitalized and was again started on steroid and taper ended 1/28/25. Patient's daughter sent messages through the portal about his nausea coming back with no improvement from zofran so budesonide 3mg TID was sent in on 2/6/25. Budesonide taper ended 3/13/25.       Anxiety, Restless Leg Syndrome: Noted since discharge by family. He started ropinirole 0.5mg and has experienced significant improvement.  Symptoms have resolved. Continue Ropinirole    Insomnia: Patient now sleeping well and only waking up to urinate at night. Continue Ambien and Ropinirole for RLS.      Blood pressure abnormalities: For awhile patient had been holding off of his carvedilol due to hypotensions and dizziness. His pressures at home have been averaging 170s/90s so he started the carvedilol 6.25mg BID again. He began to complain of orthostatic hypotension symptoms when he takes the medication and BP in clinic is 105/55. His carvedilol was lowered to 3.125 daily. Today his blood pressure is normotensive. For now continue carvedilol 3.125mg BID and will monitor and make adjustments as needed.    Facial pruritus:Likely from GVHD and dry  skin peeling. Hydrocortisone cream BID and facial moisturizer to be continued for now.     13. Urinary Frequency: Patient has been experience urinary frequency for many weeks now but denies dysuria and hematuria. Urinalysis last visit was negative. Referral for urology was offered but patient declined for now.       Orders Placed:             Follow Up: Every other week with weekly labs     BMT Route Chart for Scheduling     A total of 40 minutes was spent in pre-visit chart review, personal interpretation of labs and imaging, and medication review. Total visit time 35 minutes, >50 % counseling.     Visit today included increased complexity associated with the care of the episodic problem GVHD addressed and managing the longitudinal care of the patient due to the serious and/or complex managed problem(s) MDS s/p allogeneic transplant.     Gilma Ugalde PA-C  Malignant Hematology, Stem Cell Transplant, and Cellular Therapy  [facility]  [facility]

## 2025-04-02 ENCOUNTER — PATIENT MESSAGE (OUTPATIENT)
Dept: HEMATOLOGY/ONCOLOGY | Facility: CLINIC | Age: 70
End: 2025-04-02
Payer: MEDICARE

## 2025-04-07 ENCOUNTER — LAB VISIT (OUTPATIENT)
Dept: LAB | Facility: HOSPITAL | Age: 70
End: 2025-04-07
Attending: INTERNAL MEDICINE
Payer: MEDICARE

## 2025-04-07 ENCOUNTER — OFFICE VISIT (OUTPATIENT)
Dept: HEMATOLOGY/ONCOLOGY | Facility: CLINIC | Age: 70
End: 2025-04-07
Payer: MEDICARE

## 2025-04-07 VITALS
TEMPERATURE: 98 F | RESPIRATION RATE: 16 BRPM | OXYGEN SATURATION: 98 % | WEIGHT: 142.5 LBS | HEIGHT: 69 IN | BODY MASS INDEX: 21.11 KG/M2 | DIASTOLIC BLOOD PRESSURE: 72 MMHG | HEART RATE: 77 BPM | SYSTOLIC BLOOD PRESSURE: 139 MMHG

## 2025-04-07 DIAGNOSIS — R35.0 URINARY FREQUENCY: ICD-10-CM

## 2025-04-07 DIAGNOSIS — G47.00 INSOMNIA, UNSPECIFIED TYPE: ICD-10-CM

## 2025-04-07 DIAGNOSIS — Z94.81 S/P ALLOGENEIC BONE MARROW TRANSPLANT: Primary | ICD-10-CM

## 2025-04-07 DIAGNOSIS — G25.81 RESTLESS LEG SYNDROME: ICD-10-CM

## 2025-04-07 DIAGNOSIS — D89.813 GVHD (GRAFT VERSUS HOST DISEASE): ICD-10-CM

## 2025-04-07 DIAGNOSIS — D46.9 MYELODYSPLASTIC SYNDROME: ICD-10-CM

## 2025-04-07 DIAGNOSIS — Z94.84 IMMUNOCOMPROMISED STATE ASSOCIATED WITH STEM CELL TRANSPLANT: ICD-10-CM

## 2025-04-07 DIAGNOSIS — L29.9 PRURITUS: ICD-10-CM

## 2025-04-07 DIAGNOSIS — Z76.82 STEM CELL TRANSPLANT CANDIDATE: ICD-10-CM

## 2025-04-07 DIAGNOSIS — D61.818 PANCYTOPENIA: ICD-10-CM

## 2025-04-07 DIAGNOSIS — D84.822 IMMUNOCOMPROMISED STATE ASSOCIATED WITH STEM CELL TRANSPLANT: ICD-10-CM

## 2025-04-07 LAB
ABSOLUTE NEUTROPHIL MANUAL (OHS): 1.4 K/UL
ALBUMIN SERPL BCP-MCNC: 3.3 G/DL (ref 3.5–5.2)
ALP SERPL-CCNC: 77 UNIT/L (ref 40–150)
ALT SERPL W/O P-5'-P-CCNC: 45 UNIT/L (ref 10–44)
ANION GAP (OHS): 8 MMOL/L (ref 8–16)
ANISOCYTOSIS BLD QL SMEAR: SLIGHT
AST SERPL-CCNC: 43 UNIT/L (ref 11–45)
BASOPHILS NFR BLD MANUAL: 1 %
BILIRUB SERPL-MCNC: 0.3 MG/DL (ref 0.1–1)
BUN SERPL-MCNC: 17 MG/DL (ref 8–23)
CALCIUM SERPL-MCNC: 9.1 MG/DL (ref 8.7–10.5)
CHLORIDE SERPL-SCNC: 106 MMOL/L (ref 95–110)
CO2 SERPL-SCNC: 25 MMOL/L (ref 23–29)
CREAT SERPL-MCNC: 1.3 MG/DL (ref 0.5–1.4)
DACRYOCYTES BLD QL SMEAR: ABNORMAL
ERYTHROCYTE [DISTWIDTH] IN BLOOD BY AUTOMATED COUNT: 13.8 % (ref 11.5–14.5)
GFR SERPLBLD CREATININE-BSD FMLA CKD-EPI: 59 ML/MIN/1.73/M2
GLUCOSE SERPL-MCNC: 129 MG/DL (ref 70–110)
HCT VFR BLD AUTO: 31.5 % (ref 40–54)
HGB BLD-MCNC: 10.9 GM/DL (ref 14–18)
INDIRECT COOMBS: NORMAL
LYMPHOCYTES NFR BLD MANUAL: 23 % (ref 18–48)
MAGNESIUM SERPL-MCNC: 1.6 MG/DL (ref 1.6–2.6)
MCH RBC QN AUTO: 39.4 PG (ref 27–31)
MCHC RBC AUTO-ENTMCNC: 34.6 G/DL (ref 32–36)
MCV RBC AUTO: 114 FL (ref 82–98)
METAMYELOCYTES NFR BLD MANUAL: 1 %
MONOCYTES NFR BLD MANUAL: 17 % (ref 4–15)
NEUTROPHILS NFR BLD MANUAL: 58 % (ref 38–73)
NUCLEATED RBC (/100WBC) (OHS): 0 /100 WBC
PHOSPHATE SERPL-MCNC: 3.6 MG/DL (ref 2.7–4.5)
PLATELET # BLD AUTO: 41 K/UL (ref 150–450)
PLATELET BLD QL SMEAR: ABNORMAL
PMV BLD AUTO: 12 FL (ref 9.2–12.9)
POIKILOCYTOSIS BLD QL SMEAR: SLIGHT
POLYCHROMASIA BLD QL SMEAR: ABNORMAL
POTASSIUM SERPL-SCNC: 4.6 MMOL/L (ref 3.5–5.1)
PROT SERPL-MCNC: 5.8 GM/DL (ref 6–8.4)
RBC # BLD AUTO: 2.77 M/UL (ref 4.6–6.2)
RH BLD: NORMAL
SODIUM SERPL-SCNC: 139 MMOL/L (ref 136–145)
SPECIMEN OUTDATE: NORMAL
WBC # BLD AUTO: 2.39 K/UL (ref 3.9–12.7)

## 2025-04-07 PROCEDURE — 84520 ASSAY OF UREA NITROGEN: CPT

## 2025-04-07 PROCEDURE — 36415 COLL VENOUS BLD VENIPUNCTURE: CPT

## 2025-04-07 PROCEDURE — 3288F FALL RISK ASSESSMENT DOCD: CPT | Mod: CPTII,S$GLB,,

## 2025-04-07 PROCEDURE — 99999 PR PBB SHADOW E&M-EST. PATIENT-LVL IV: CPT | Mod: PBBFAC,,,

## 2025-04-07 PROCEDURE — 1159F MED LIST DOCD IN RCRD: CPT | Mod: CPTII,S$GLB,,

## 2025-04-07 PROCEDURE — 85025 COMPLETE CBC W/AUTO DIFF WBC: CPT

## 2025-04-07 PROCEDURE — 1126F AMNT PAIN NOTED NONE PRSNT: CPT | Mod: CPTII,S$GLB,,

## 2025-04-07 PROCEDURE — 84100 ASSAY OF PHOSPHORUS: CPT

## 2025-04-07 PROCEDURE — 3075F SYST BP GE 130 - 139MM HG: CPT | Mod: CPTII,S$GLB,,

## 2025-04-07 PROCEDURE — 86900 BLOOD TYPING SEROLOGIC ABO: CPT | Performed by: INTERNAL MEDICINE

## 2025-04-07 PROCEDURE — 83735 ASSAY OF MAGNESIUM: CPT

## 2025-04-07 PROCEDURE — 87799 DETECT AGENT NOS DNA QUANT: CPT

## 2025-04-07 PROCEDURE — 3078F DIAST BP <80 MM HG: CPT | Mod: CPTII,S$GLB,,

## 2025-04-07 PROCEDURE — 3008F BODY MASS INDEX DOCD: CPT | Mod: CPTII,S$GLB,,

## 2025-04-07 PROCEDURE — 80197 ASSAY OF TACROLIMUS: CPT

## 2025-04-07 PROCEDURE — 99215 OFFICE O/P EST HI 40 MIN: CPT | Mod: S$GLB,,,

## 2025-04-07 PROCEDURE — 1101F PT FALLS ASSESS-DOCD LE1/YR: CPT | Mod: CPTII,S$GLB,,

## 2025-04-07 NOTE — PROGRESS NOTES
Section of Hematology and Stem Cell Transplantation    Post-Transplantation Follow Up Visit       Notes:    04/07/2025    Transplant History:   Primary oncologist: Paco Hickey MD  Primary oncologic diagnosis: MDS  Transplant date: 9/19/2024  Donor: haploidentical  Blood Type (Patient): B +  Blood Type (Donor): A +  CMV (Patient): Positive  CMV (Donor): Positive  Graft source: Bone marrow  CD34+ cell dose: 3.55x10^6  Conditioning Regimen: Fludarabine plus melphalan 100 mg/m2 + 2Gy TBI  GVHD prophylaxis: Post-transplant cyclophosphamide, Tacrolimus, MMF  Immediate post-transplant complications: Patient experienced expected GI toxicities with C diff negative diarrhea and nausea, neutropenic fever with negative infectious work up, expected cytopenias requiring transfusions, electrolyte abnormalities requiring replacement, volume overload requiring diuresis, intermittent SOB from pulmonary edema requiring 02, and a hemorrhoid flare up. Diarrhea and SOB improved towards the end of the hospital stay.     History of Present Ilness:   Guillaume Salinas (Guillaume) is a pleasant 69 y.o.male with a past medical history of MDS who is status post haploidentical stem cell transplantation conditioned with FluMel 100 + PTCy+ 2Gy TBI who is currently day +200  who presents for post-transplant follow up. His daughter translated today, they denied our  services.     Interval History:   Patient presents for routine follow-up post allogeneic transplant. He has been having both good and bad days. His energy levels are still low but are slightly better. His nausea comes and goes. Zofran helps his nausea. His appetite is getting better. No vomiting and diarrhea.  His skin is pruritic but his rash looks much better. He is still having some frequency but no dysuria.     PAST MEDICAL HISTORY:   Past Medical History:   Diagnosis Date    Anticoagulant long-term use     Coronary artery disease     Hypertension      Myelodysplastic syndrome     Peripheral vascular disease, unspecified        PAST SURGICAL HISTORY:   Past Surgical History:   Procedure Laterality Date    BONE MARROW BIOPSY Left 2023    Procedure: Biopsy-bone marrow;  Surgeon: Harry Diamond MD;  Location: [facility] OR;  Service: Oncology;  Laterality: Left;    COLONOSCOPY N/A 2022    Procedure: COLONOSCOPY Golytely Vaccinated will bring cards;  Surgeon: Dereje Simon MD;  Location: [facility] ENDO;  Service: Endoscopy;  Laterality: N/A;  Do not cancel this order    INSERTION OF LEMONS CATHETER Right 2024    Procedure: INSERTION, CATHETER, CENTRAL VENOUS, LEMONS TRIPLE LUMEN;  Surgeon: Kg Patten MD;  Location: [facility] OR [address] [place] [address];  Service: General;  Laterality: Right;     PAST SOCIAL HISTORY:  Social History     Socioeconomic History    Marital status:    Tobacco Use    Smoking status: Former     Current packs/day: 0.00     Average packs/day: 0.3 packs/day for 50.0 years (12.5 ttl pk-yrs)     Types: Cigarettes     Start date: 3/1/1973     Quit date: 3/1/2023     Years since quittin.1     Passive exposure: Past    Smokeless tobacco: Never   Substance and Sexual Activity    Alcohol use: Not Currently    Drug use: Never    Sexual activity: Not Currently     Partners: Female     Social Drivers of Health     Financial Resource Strain: Patient Declined (2024)    Overall Financial Resource Strain (CARDIA)     Difficulty of Paying Living Expenses: Patient declined   Food Insecurity: Patient Declined (2024)    Hunger Vital Sign     Worried About Running Out of Food in the Last Year: Patient declined     Ran Out of Food in the Last Year: Patient declined   Transportation Needs: Patient Declined (2024)    TRANSPORTATION NEEDS     Transportation : Patient declined   Physical Activity: Inactive (1/10/2025)    Exercise Vital Sign     Days of Exercise per Week: 0 days     Minutes of Exercise per Session: 10 min   Stress:  Patient Declined (12/28/2024)    [employer] - Occupational Stress Questionnaire     Feeling of Stress : Patient declined   Recent Concern: Stress - Stress Concern Present (9/30/2024)    [employer] - Occupational Stress Questionnaire     Feeling of Stress : To some extent   Housing Stability: Patient Declined (12/28/2024)    Housing Stability Vital Sign     Unable to Pay for Housing in the Last Year: Patient declined     Homeless in the Last Year: Patient declined       FAMILY HISTORY:  Cancer-related family history includes Cancer in his brother and father.    CURRENT MEDICATIONS:   Medication List with Changes/Refills   Current Medications    ACYCLOVIR (ZOVIRAX) 800 MG TAB    Take 1 tablet (800 mg total) by mouth 2 (two) times daily.    ATOVAQUONE (MEPRON) 750 MG/5 ML SUSP ORAL LIQUID    Take 10 mLs (1,500 mg total) by mouth once daily.    BUDESONIDE (ENTOCORT EC) 3 MG CAPSULE    Take 1 capsule (3 mg total) by mouth 3 (three) times daily.    CARVEDILOL (COREG) 3.125 MG TABLET    Take 1 tablet (3.125 mg total) by mouth 2 (two) times daily.    CYANOCOBALAMIN 1,000 MCG/ML INJECTION    Inject 1 mL (1,000 mcg total) into the muscle once a week.    ELTROMBOPAG OLAMINE (PROMACTA) 50 MG TAB    Take 1 tablet (50 mg total) by mouth once daily.    FOLIC ACID (FOLVITE) 1 MG TABLET    Take 1 tablet (1 mg total) by mouth once daily.    GABAPENTIN (NEURONTIN) 300 MG CAPSULE    Take 2 capsules (600 mg total) by mouth nightly as needed (Restless leg).    HYDROCORTISONE 1 % CREAM    Apply to affected area 2 times daily    LETERMOVIR (PREVYMIS) 480 MG TAB    Take 1 tablet (480 mg total) by mouth Daily.    LEVOFLOXACIN (LEVAQUIN) 500 MG TABLET    Take 1 tablet (500 mg total) by mouth once daily.    LOPERAMIDE (IMODIUM A-D) 2 MG TAB    Take 2 mg by mouth 4 (four) times daily as needed.    MAGNESIUM OXIDE (MAG-OX) 400 MG (241.3 MG MAGNESIUM) TABLET    Take 2 tablets (800 mg  total) by mouth 2 (two) times daily.    ONDANSETRON (ZOFRAN-ODT) 8 MG TBDL    Take 1 tablet (8 mg total) by mouth every 8 (eight) hours as needed (nausea).    ONDANSETRON (ZOFRAN-ODT) 8 MG TBDL    Take 1 tablet (8 mg total) by mouth every 8 (eight) hours as needed (nausea/vomiting).    PANTOPRAZOLE (PROTONIX) 40 MG TABLET    Take 1 tablet (40 mg total) by mouth once daily.    POSACONAZOLE (NOXAFIL) 100 MG TBEC TABLET    Take 3 tablets (300 mg total) by mouth once daily.    POSACONAZOLE (NOXAFIL) 100 MG TBEC TABLET    Take 3 tablets (300 mg total) by mouth once daily.    ROPINIROLE (REQUIP) 0.5 MG TABLET    Take 1 tablet (0.5 mg total) by mouth every evening.    TACROLIMUS (PROGRAF) 0.5 MG CAP    Take 2 capsules (1 mg total) by mouth every morning AND 1 capsule (0.5 mg total) every evening.    ZOLPIDEM (AMBIEN) 5 MG TAB    Take 1 tablet (5 mg total) by mouth nightly as needed (insomnia).       ALLERGIES:   Review of patient's allergies indicates:  No Known Allergies    GVHD Review of Systems:     Pertinent positives and negatives included in the HPI. Otherwise a 14 point review of systems is negative. GVHD review of systems recorded in BMT flowsheet.     Physical Exam:     Vitals:    04/07/25 1419   BP: 139/72   Pulse: 77   Resp: 16   Temp: 98.3 °F (36.8 °C)           General: Appears well, NAD.   HEENT: MMM, no OP lesions  Pulmonary: CTAB, no increased work of breathing, no W/R/C  Cardiovascular: S1S2 normal, RRR, no M/R/G  Abdominal: Soft, NT, ND, BS+, no HSM  Extremities: No C/C/E  Neurological: AAOx4, grossly normal, no focal deficits  Dermatologic: GVHD face rash resolved. Dry/peeling skin noted to patient's forehead      ECOG Performance Status: (foot note - ECOG PS provided by Eastern Cooperative Oncology Group) 1 - Symptomatic but completely ambulatory    Karnofsky Performance Score:  80%- Normal Activity with Effort: Some Symptoms of Disease    Labs:   Lab Results   Component Value Date    WBC 2.39 (L)  04/07/2025    HGB 10.9 (L) 04/07/2025    HCT 31.5 (L) 04/07/2025     (H) 04/07/2025    PLT 41 (L) 04/07/2025        CMP  Sodium   Date Value Ref Range Status   04/07/2025 139 136 - 145 mmol/L Final   03/17/2025 140 136 - 145 mmol/L Final     Potassium   Date Value Ref Range Status   04/07/2025 4.6 3.5 - 5.1 mmol/L Final   03/17/2025 4.9 3.5 - 5.1 mmol/L Final     Chloride   Date Value Ref Range Status   04/07/2025 106 95 - 110 mmol/L Final   03/17/2025 105 95 - 110 mmol/L Final     CO2   Date Value Ref Range Status   04/07/2025 25 23 - 29 mmol/L Final   03/17/2025 26 23 - 29 mmol/L Final     Glucose   Date Value Ref Range Status   03/17/2025 109 70 - 110 mg/dL Final     BUN   Date Value Ref Range Status   04/07/2025 17 8 - 23 mg/dL Final     Creatinine   Date Value Ref Range Status   04/07/2025 1.3 0.5 - 1.4 mg/dL Final     Calcium   Date Value Ref Range Status   04/07/2025 9.1 8.7 - 10.5 mg/dL Final   03/17/2025 9.3 8.7 - 10.5 mg/dL Final     Total Protein   Date Value Ref Range Status   03/17/2025 6.1 6.0 - 8.4 g/dL Final     Albumin   Date Value Ref Range Status   04/07/2025 3.3 (L) 3.5 - 5.2 g/dL Final   03/17/2025 3.5 3.5 - 5.2 g/dL Final     Total Bilirubin   Date Value Ref Range Status   03/17/2025 0.5 0.1 - 1.0 mg/dL Final     Comment:     For infants and newborns, interpretation of results should be based  on gestational age, weight and in agreement with clinical  observations.    Premature Infant recommended reference ranges:  Up to 24 hours.............<8.0 mg/dL  Up to 48 hours............<12.0 mg/dL  3-5 days..................<15.0 mg/dL  6-29 days.................<15.0 mg/dL       Bilirubin Total   Date Value Ref Range Status   04/07/2025 0.3 0.1 - 1.0 mg/dL Final     Comment:     For infants and newborns, interpretation of results should be based   on gestational age, weight and in agreement with clinical   observations.    Premature Infant recommended reference ranges:   0-24 hours:  <8.0  mg/dL   24-48 hours: <12.0 mg/dL   3-5 days:    <15.0 mg/dL   6-29 days:   <15.0 mg/dL     Alkaline Phosphatase   Date Value Ref Range Status   03/17/2025 66 40 - 150 U/L Final     ALP   Date Value Ref Range Status   04/07/2025 77 40 - 150 unit/L Final     AST   Date Value Ref Range Status   04/07/2025 43 11 - 45 unit/L Final   03/17/2025 31 10 - 40 U/L Final     ALT   Date Value Ref Range Status   04/07/2025 45 (H) 10 - 44 unit/L Final   03/17/2025 35 10 - 44 U/L Final     Anion Gap   Date Value Ref Range Status   04/07/2025 8 8 - 16 mmol/L Final     eGFR   Date Value Ref Range Status   04/07/2025 59 (L) >60 mL/min/1.73/m2 Final     Comment:     Estimated GFR calculated using the CKD-EPI creatinine (2021) equation.   03/17/2025 >60 >60 mL/min/1.73 m^2 Final          Imaging:   Hospital imaging reviewed.    Pathology:  Prior pathology reviewed.   Acute GVHD Scoring:  GVHD Acute Assessment- No active GVHD.                Assessment and Plan:   Guillaume Salinas (Guillaume) is a pleasant 69 y.o.male with a past medical history of MDS s/p haplo SCT who presents for post-transplant follow up.    MDS: Status post treatment with azacitidine 75 mg/m2 daily x7 days plus venetoclax 100mg (voriconazole) daily x14 of 28 days.Venetoclax decreased to 7 days with cycle 3 until completion of 11 cycles. No plans for maintenance at this time.     Status post allogeneic stem cell transplantation: As noted above, status post haploidentical stem cell transplantation conditioned with FluMel 100 + PTCy+ 2Gy TBI . Currently Day+ 200. Engrafted on 10/09/24 day +20.  Day 30 bone marrow (11/5/2024) showing mildly hypercellular marrow with no increased blast (0.3%) and no evidence of myeloid neoplasm. Pending chimerisms, NGS, and CG  Day 100 bone marrow biopsy on 12/31/24 showed 30-40% cellularity, erythroid hyperplasia, dyserythropoiesis, decreased megakaryocytes with atypical morphology. No hemophagocytosis mentioned. NGS, FISH, and  CG normal. 100% chimerisms.     3.    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 He started budesonide 3mg TID on 10/28/24. Due to persistent nausea despite budesonide so started systemic steroids 11/1 at 0.5 mg/kg and his steroid taper has been given to him. Steroid taper completed 12/8/24. No evidence of aGVHD today. 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 Taper completed. Patient's daughter sent messages through the portal about his nausea coming back with no improvement from zofran so budesonide 3mg TID was sent in on 2/6/25. Budesonide taper ended 3/13/25. Can consider tapering off tacrolimus in next few weeks if GVHD does not return. Skin gvhd present on face with erythema, inflammation, and sand paper feel on 3/14/25. Rash now improved on 4/7/25 with now only have some erythema likely from skin irration from hydrocortisone cream- cream stopped today.    A. Face rash improved. Patient still has facial erythema and peeling skin likely from irration due to hydrocortisone cream. Hydrocortisone cream discontinued today. Will continue with cerave moisturizer BID.  B. Current tacro dose: : 1mg qAM and 0.5mg qPM  C. Last tacro level: pending today  D. Adjustments: pending results.     4.     Immunosuppression: Prevention with posaconazole, acyclovir. CMV prophylaxis with letermovir. PJP prophyalxis atovaquone. Continue weekly monitoring of CMV and EBV.  Last CMV: Not-detected,   last EBV:up trending at 223  Active infections: N/A    Pancytopenia: Due to underlying disease and chemotherapy. Transfuse for Hgb <7 g/dL and platelets <10k. Folate and copper deficient, on supplementation. Will continue to monitor to see if platelets begin to rise with his supplements. Promacta  sent in on 12/9/24. Change TMP/SMX to atovaquone. Continue promacta and supplements listed below  Folic Acid deficiency: Continue folic acid 1mg daily  Copper deficiency: Copper level of 635, continue copper gluconate 2mg daily   B12 deficiency: B12 injections sent in today. Patient wants to come into clinic for his injections. Plan to do weekly injections for the first month and then monthly after    Post transplant lymphoproliferative disorder: Patient received rituxan on 12/30/24 due to possible PTLD with elevated EBV and possible HLH. Now EBV has improved.   EBV negative     Hypomagnesemia: Continue MagOx to 800mg twice daily.      Chemotherapy Induced Nausea: Resolved with steroids and the taper originally ended 12/8/24. He ended up having nausea again while hospitalized and was again started on steroid and taper ended 1/28/25. Patient's daughter sent messages through the portal about his nausea coming back with no improvement from zofran so budesonide 3mg TID was sent in on 2/6/25. Budesonide taper ended 3/13/25.       Anxiety, Restless Leg Syndrome: Noted since discharge by family. He started ropinirole 0.5mg and has experienced significant improvement.  Symptoms have resolved. Continue Ropinirole    Insomnia: Patient now sleeping well and only waking up to urinate at night. Continue Ambien and Ropinirole for RLS.      Blood pressure abnormalities: For awhile patient had been holding off of his carvedilol due to hypotensions and dizziness. His pressures at home have been averaging 170s/90s so he started the carvedilol 6.25mg BID again. He began to complain of orthostatic hypotension symptoms when he takes the medication and BP in clinic is 105/55. His carvedilol was lowered to 3.125 daily. Today his blood pressure is normotensive. For now continue carvedilol 3.125mg BID and will monitor and make adjustments as needed.    Facial pruritus:Likely from irritation due to hydrocortisone cream. Hydrocortisone cream  discontinued today. Will continue with cerave moisturizer BID.    13. Urinary Frequency: Patient has been experiencing urinary frequency for many weeks now but denies dysuria and hematuria. Urinalysis  was negative. Referral for urology placed today.    Orders Placed:             Follow Up: Every other week with weekly labs       BMT Chart Routing      Follow up with physician 2 weeks. Gilma   Follow up with [facility]    Provider visit type    Infusion scheduling note    Injection scheduling note    Labs CBC, CMP, magnesium, phosphorus, CMV, EBV and tacrolimus level   Scheduling:  Preferred lab:  Lab interval:     Imaging    Pharmacy appointment    Other referrals                   A total of 40 minutes was spent in pre-visit chart review, personal interpretation of labs and imaging, and medication review. Total visit time 35 minutes, >50 % counseling.       Gimla Ugalde PA-C  Malignant Hematology, Stem Cell Transplant, and Cellular Therapy  [facility]  [facility]

## 2025-04-08 DIAGNOSIS — D46.9 MYELODYSPLASTIC SYNDROME: ICD-10-CM

## 2025-04-08 LAB
CYTOMEGALOVIRUS DNA, QUAL (OHS): NOT DETECTED
EPSTEIN-BARR VIRUS DNA, QUAL (OHS): NORMAL
TACROLIMUS BLD-MCNC: 15.7 NG/ML (ref 5–15)

## 2025-04-08 RX ORDER — TACROLIMUS 0.5 MG/1
CAPSULE ORAL
Qty: 90 CAPSULE | Refills: 5 | Status: SHIPPED | OUTPATIENT
Start: 2025-04-08

## 2025-04-08 NOTE — PROGRESS NOTES
BMT Pharmacist Immunosuppression Note:    Reviewed patient's tacrolimus level with Dr. Hickey and it is above goal range.      Lab Results   Component Value Date    TACROLIMUS 15.7 (H) 04/07/2025    TACROLIMUS 10.4 03/31/2025    TACROLIMUS 4.7 (L) 03/25/2025       Lab Results   Component Value Date    CREATININE 1.3 04/07/2025    CREATININE 1.3 03/31/2025    CREATININE 1.3 03/25/2025    BILITOT 0.3 04/07/2025    BILITOT 0.4 03/31/2025    BILITOT 0.4 03/25/2025    AST 43 04/07/2025    AST 28 03/31/2025    AST 29 03/25/2025    ALT 45 (H) 04/07/2025    ALT 27 03/31/2025    ALT 27 03/25/2025         Current regimen: 1 mg in the morning and 0.5 mg in the evening    Plan: Decrease regimen to 1 capsule (0.5 mg) in the AM and 1 capsule (0.5 mg) in the evening.        Terese Cantor, PharmD  Clinical Pharmacy Specialist, Bone Marrow Transplant/Hematology  Spectra link: [phone]

## 2025-04-14 ENCOUNTER — PATIENT MESSAGE (OUTPATIENT)
Dept: HEMATOLOGY/ONCOLOGY | Facility: CLINIC | Age: 70
End: 2025-04-14
Payer: MEDICARE

## 2025-04-16 ENCOUNTER — LAB VISIT (OUTPATIENT)
Dept: LAB | Facility: HOSPITAL | Age: 70
End: 2025-04-16
Attending: INTERNAL MEDICINE
Payer: MEDICARE

## 2025-04-16 DIAGNOSIS — Z76.82 STEM CELL TRANSPLANT CANDIDATE: ICD-10-CM

## 2025-04-16 DIAGNOSIS — D46.9 MYELODYSPLASTIC SYNDROME: ICD-10-CM

## 2025-04-16 LAB
ABSOLUTE EOSINOPHIL (OHS): 0.04 K/UL
ABSOLUTE MONOCYTE (OHS): 0.44 K/UL (ref 0.3–1)
ABSOLUTE NEUTROPHIL COUNT (OHS): 1.3 K/UL (ref 1.8–7.7)
ALBUMIN SERPL BCP-MCNC: 3.7 G/DL (ref 3.5–5.2)
ALP SERPL-CCNC: 80 UNIT/L (ref 40–150)
ALT SERPL W/O P-5'-P-CCNC: 51 UNIT/L (ref 10–44)
ANION GAP (OHS): 12 MMOL/L (ref 8–16)
AST SERPL-CCNC: 47 UNIT/L (ref 11–45)
BASOPHILS # BLD AUTO: 0.01 K/UL
BASOPHILS NFR BLD AUTO: 0.4 %
BILIRUB SERPL-MCNC: 0.5 MG/DL (ref 0.1–1)
BUN SERPL-MCNC: 12 MG/DL (ref 8–23)
CALCIUM SERPL-MCNC: 7.5 MG/DL (ref 8.7–10.5)
CHLORIDE SERPL-SCNC: 106 MMOL/L (ref 95–110)
CO2 SERPL-SCNC: 22 MMOL/L (ref 23–29)
CREAT SERPL-MCNC: 1.4 MG/DL (ref 0.5–1.4)
ERYTHROCYTE [DISTWIDTH] IN BLOOD BY AUTOMATED COUNT: 13.6 % (ref 11.5–14.5)
GFR SERPLBLD CREATININE-BSD FMLA CKD-EPI: 54 ML/MIN/1.73/M2
GLUCOSE SERPL-MCNC: 117 MG/DL (ref 70–110)
HCT VFR BLD AUTO: 33.2 % (ref 40–54)
HGB BLD-MCNC: 11.5 GM/DL (ref 14–18)
IMM GRANULOCYTES # BLD AUTO: 0.04 K/UL (ref 0–0.04)
IMM GRANULOCYTES NFR BLD AUTO: 1.6 % (ref 0–0.5)
LDH SERPL-CCNC: 260 U/L (ref 110–260)
LYMPHOCYTES # BLD AUTO: 0.62 K/UL (ref 1–4.8)
MAGNESIUM SERPL-MCNC: 1.5 MG/DL (ref 1.6–2.6)
MCH RBC QN AUTO: 38.6 PG (ref 27–31)
MCHC RBC AUTO-ENTMCNC: 34.6 G/DL (ref 32–36)
MCV RBC AUTO: 111 FL (ref 82–98)
NUCLEATED RBC (/100WBC) (OHS): 0 /100 WBC
PHOSPHATE SERPL-MCNC: 3.7 MG/DL (ref 2.7–4.5)
PLATELET # BLD AUTO: 53 K/UL (ref 150–450)
PMV BLD AUTO: 11.2 FL (ref 9.2–12.9)
POTASSIUM SERPL-SCNC: 6 MMOL/L (ref 3.5–5.1)
PROT SERPL-MCNC: 6 GM/DL (ref 6–8.4)
RBC # BLD AUTO: 2.98 M/UL (ref 4.6–6.2)
RELATIVE EOSINOPHIL (OHS): 1.6 %
RELATIVE LYMPHOCYTE (OHS): 25.3 % (ref 18–48)
RELATIVE MONOCYTE (OHS): 18 % (ref 4–15)
RELATIVE NEUTROPHIL (OHS): 53.1 % (ref 38–73)
SODIUM SERPL-SCNC: 140 MMOL/L (ref 136–145)
URATE SERPL-MCNC: 6.3 MG/DL (ref 3.4–7)
WBC # BLD AUTO: 2.45 K/UL (ref 3.9–12.7)

## 2025-04-16 PROCEDURE — 36415 COLL VENOUS BLD VENIPUNCTURE: CPT

## 2025-04-16 PROCEDURE — 83735 ASSAY OF MAGNESIUM: CPT

## 2025-04-16 PROCEDURE — 84100 ASSAY OF PHOSPHORUS: CPT

## 2025-04-16 PROCEDURE — 80053 COMPREHEN METABOLIC PANEL: CPT

## 2025-04-16 PROCEDURE — 85025 COMPLETE CBC W/AUTO DIFF WBC: CPT

## 2025-04-16 PROCEDURE — 84550 ASSAY OF BLOOD/URIC ACID: CPT

## 2025-04-16 PROCEDURE — 83615 LACTATE (LD) (LDH) ENZYME: CPT

## 2025-04-16 PROCEDURE — 87799 DETECT AGENT NOS DNA QUANT: CPT

## 2025-04-16 PROCEDURE — 80197 ASSAY OF TACROLIMUS: CPT

## 2025-04-17 ENCOUNTER — LAB VISIT (OUTPATIENT)
Dept: LAB | Facility: HOSPITAL | Age: 70
End: 2025-04-17
Attending: INTERNAL MEDICINE
Payer: MEDICARE

## 2025-04-17 ENCOUNTER — PATIENT MESSAGE (OUTPATIENT)
Dept: HEMATOLOGY/ONCOLOGY | Facility: CLINIC | Age: 70
End: 2025-04-17
Payer: MEDICARE

## 2025-04-17 ENCOUNTER — RESULTS FOLLOW-UP (OUTPATIENT)
Dept: HEMATOLOGY/ONCOLOGY | Facility: CLINIC | Age: 70
End: 2025-04-17

## 2025-04-17 ENCOUNTER — CLINICAL SUPPORT (OUTPATIENT)
Dept: REHABILITATION | Facility: HOSPITAL | Age: 70
End: 2025-04-17
Payer: MEDICARE

## 2025-04-17 DIAGNOSIS — D46.9 MYELODYSPLASTIC SYNDROME: ICD-10-CM

## 2025-04-17 DIAGNOSIS — Z76.82 STEM CELL TRANSPLANT CANDIDATE: ICD-10-CM

## 2025-04-17 DIAGNOSIS — R53.1 DECREASED STRENGTH, ENDURANCE, AND MOBILITY: Primary | ICD-10-CM

## 2025-04-17 DIAGNOSIS — R68.89 DECREASED STRENGTH, ENDURANCE, AND MOBILITY: Primary | ICD-10-CM

## 2025-04-17 DIAGNOSIS — D89.813 GVHD (GRAFT VERSUS HOST DISEASE): Primary | ICD-10-CM

## 2025-04-17 DIAGNOSIS — Z74.09 DECREASED STRENGTH, ENDURANCE, AND MOBILITY: Primary | ICD-10-CM

## 2025-04-17 LAB
ALBUMIN SERPL BCP-MCNC: 3.6 G/DL (ref 3.5–5.2)
ALP SERPL-CCNC: 74 UNIT/L (ref 40–150)
ALT SERPL W/O P-5'-P-CCNC: 48 UNIT/L (ref 10–44)
ANION GAP (OHS): 7 MMOL/L (ref 8–16)
AST SERPL-CCNC: 42 UNIT/L (ref 11–45)
BILIRUB SERPL-MCNC: 0.5 MG/DL (ref 0.1–1)
BUN SERPL-MCNC: 12 MG/DL (ref 8–23)
CALCIUM SERPL-MCNC: 9 MG/DL (ref 8.7–10.5)
CHLORIDE SERPL-SCNC: 106 MMOL/L (ref 95–110)
CO2 SERPL-SCNC: 28 MMOL/L (ref 23–29)
CREAT SERPL-MCNC: 1.4 MG/DL (ref 0.5–1.4)
CYTOMEGALOVIRUS DNA, QUAL (OHS): NOT DETECTED
EPSTEIN-BARR VIRUS DNA, QUAL (OHS): NORMAL
GFR SERPLBLD CREATININE-BSD FMLA CKD-EPI: 54 ML/MIN/1.73/M2
GLUCOSE SERPL-MCNC: 106 MG/DL (ref 70–110)
POTASSIUM SERPL-SCNC: 4.2 MMOL/L (ref 3.5–5.1)
PROT SERPL-MCNC: 5.7 GM/DL (ref 6–8.4)
SODIUM SERPL-SCNC: 141 MMOL/L (ref 136–145)
TACROLIMUS BLD-MCNC: 15.1 NG/ML (ref 5–15)

## 2025-04-17 PROCEDURE — 97530 THERAPEUTIC ACTIVITIES: CPT | Mod: PN

## 2025-04-17 PROCEDURE — 80053 COMPREHEN METABOLIC PANEL: CPT

## 2025-04-17 PROCEDURE — 36415 COLL VENOUS BLD VENIPUNCTURE: CPT

## 2025-04-17 RX ORDER — TACROLIMUS 0.5 MG/1
0.5 CAPSULE ORAL EVERY MORNING
Qty: 90 CAPSULE | Refills: 3 | Status: SHIPPED | OUTPATIENT
Start: 2025-04-17

## 2025-04-17 NOTE — PROGRESS NOTES
[facility]   Physical Therapy Treatment Note/updated Plan of Care      Name: Guillaume Salinas  Clinic Number: [id number]    Therapy Diagnosis:   Encounter Diagnosis   Name Primary?    Decreased strength, endurance, and mobility Yes       Physician: Mohit Hickey MD    Visit Date: 4/17/2025    Physician Orders: PT Eval and Treat   Medical Diagnosis from Referral: Z94.81 (ICD-10-CM) - S/P allogeneic bone marrow transplant   Evaluation Date: 12/20/2024  Authorization Period Expiration: 12/09/2025   Plan of Care Expiration: 7/17/25  Progress Note Due: 5/17/25  Date of Surgery: 9/19/14  Visit # / Visits authorized: 10/ 20 +eval  FOTO: 2/ 3     Precautions: Standard and HTN,Myelodysplastic syndrome, PVD, CAD, chemotherapy        Time In: 2:12PM   Time Out: 3:00PM  Total Billable Time: 30 minutes    PTA Visit #: 0/5     Subjective     Patient reports: He has been feeling a little nauseous and that has caused him to miss his last therapy visit. Because of that he cannot eat. He is feeling weak from all of that. His blood pressure has been good. He had blood work done yesterday and everything looked good except for his potassium.   He was not compliant with home exercise program.  Response to previous treatment: very good, a little tired  Functional change: ongoing    Pain: 0/10  Location: bilateral legs     Objective    4/17/25    30 second sit to stand: 11 reps without use of hands  6 Minute walk test: 1232 ft      FOTO MSK-NOC Survey    Therapist reviewed FOTO scores for Guillaume Salinas on 4/17/2025.   FOTO documents entered into EPIC - see Media section.    Intake Score: 46%       Treatment     Guillaume received the treatments listed below:      neuromuscular re-education activities to improve: Balance, Coordination, Proprioception, and Posture for 15 minutes. The following activities were included:  Side lying clams 3x10 ea   Supine Straight leg raise 3x10 ea  Bridges  3x10  Short arc quad 3lb 3x10 ea    therapeutic activities to improve functional performance for 33minutes, including:    Reassessment     Seated march x30 3lb  Seated long arc quad 3lb x30ea  Seated hamstring curl green theraband 3x10 ea     NT:  Supine active range of motion shoulder flexion 3lb wand x30   Supine chest press 3lb wand 3x10  Seated Bicep curl 3lb 3x10 ea   5 minute walk around clinic   Nustep x 8 minutes (NT)  Standing hip abduction 3x10 ea  Standing heel raises 3x10   Sit to stand from raised mat x10 (only performed 8 before needing to stop)    Patient Education and Home Exercises       Education provided:   - updated Home exercise program     Written Home Exercises Provided: Pt instructed to continue prior HEP. Exercises were reviewed and Guillaume was able to demonstrate them prior to the end of the session.  Guillaume demonstrated good  understanding of the education provided. See Electronic Medical Record under Patient Instructions for exercises provided during therapy sessions    Assessment     Patient was seen in the clinic for the first time in over a month. He reported that he had some issues with nausea and was not eating and therefore felt too weak to participate in therapy. He was reassessed and continued to display good functional strength with 30 second sit to stand. However, he displayed decrease in overall mobility and endurance as seen by ambulating less distance today. At this time he would continue to benefit from skilled PT to improve endurance, mobility, and overall function.   Guillaume Is progressing well towards his goals.   Patient prognosis is Fair.     Patient will continue to benefit from skilled outpatient physical therapy to address the deficits listed in the problem list box on initial evaluation, provide pt/family education and to maximize pt's level of independence in the home and community environment.     Patient's spiritual, cultural and educational needs considered and pt  agreeable to plan of care and goals.     Anticipated barriers to physical therapy: co-morbidities    Goals: Short Term Goals: 6 weeks   Patient will be independent with Home exercise program to supplement therapy progressing, not met   Patient will be able to ambulate 1300 ft with 6 Minute walk test to improve endurance for community mobility  met  Patient will be able to lift and carry groceries without difficulty to improve ability to perform functional tasks  met     Long Term Goals: 12 weeks   Patient will be able to ambulate 1400 ft or more with 6 Minute walk test to improve endurance for community mobility progressing, not met  Patient will be able to perform 16 sit to stand on 30 second sit to  order to improve functional strength and endurance for transfers progressing, not met  Patient will improve FOTO score to 65 % to improve self perceived ability to perform functional tasks progressing, not met  Patient goal: Patient will be able to mow his lawn to return to prior level of function progressing, not met    Plan     Improve functional strength and endurance    Elvira Joyce, PT ,DPT,OCS      04/17/2025

## 2025-04-17 NOTE — PROGRESS NOTES
BMT Pharmacist Immunosuppression Note:    Reviewed patient's tacrolimus level with Dr. Hickey and it is above goal range.      Current regimen:  0.5 mg twice daily    Plan: Decrease regimen to 1 capsules (0.5 mg) in the morning        Lab Results   Component Value Date    TACROLIMUS 15.1 (H) 04/16/2025    TACROLIMUS 15.7 (H) 04/07/2025    TACROLIMUS 10.4 03/31/2025       Creatinine   Date Value Ref Range Status   04/16/2025 1.4 0.5 - 1.4 mg/dL Final   04/07/2025 1.3 0.5 - 1.4 mg/dL Final   03/31/2025 1.3 0.5 - 1.4 mg/dL Final     Total Bilirubin   Date Value Ref Range Status   03/17/2025 0.5 0.1 - 1.0 mg/dL Final     Comment:     For infants and newborns, interpretation of results should be based  on gestational age, weight and in agreement with clinical  observations.    Premature Infant recommended reference ranges:  Up to 24 hours.............<8.0 mg/dL  Up to 48 hours............<12.0 mg/dL  3-5 days..................<15.0 mg/dL  6-29 days.................<15.0 mg/dL     03/10/2025 0.4 0.1 - 1.0 mg/dL Final     Comment:     For infants and newborns, interpretation of results should be based  on gestational age, weight and in agreement with clinical  observations.    Premature Infant recommended reference ranges:  Up to 24 hours.............<8.0 mg/dL  Up to 48 hours............<12.0 mg/dL  3-5 days..................<15.0 mg/dL  6-29 days.................<15.0 mg/dL     03/03/2025 0.5 0.1 - 1.0 mg/dL Final     Comment:     For infants and newborns, interpretation of results should be based  on gestational age, weight and in agreement with clinical  observations.    Premature Infant recommended reference ranges:  Up to 24 hours.............<8.0 mg/dL  Up to 48 hours............<12.0 mg/dL  3-5 days..................<15.0 mg/dL  6-29 days.................<15.0 mg/dL       Bilirubin Total   Date Value Ref Range Status   04/16/2025 0.5 0.1 - 1.0 mg/dL Final     Comment:     For infants and newborns, interpretation  of results should be based   on gestational age, weight and in agreement with clinical   observations.    Premature Infant recommended reference ranges:   0-24 hours:  <8.0 mg/dL   24-48 hours: <12.0 mg/dL   3-5 days:    <15.0 mg/dL   6-29 days:   <15.0 mg/dL   04/07/2025 0.3 0.1 - 1.0 mg/dL Final     Comment:     For infants and newborns, interpretation of results should be based   on gestational age, weight and in agreement with clinical   observations.    Premature Infant recommended reference ranges:   0-24 hours:  <8.0 mg/dL   24-48 hours: <12.0 mg/dL   3-5 days:    <15.0 mg/dL   6-29 days:   <15.0 mg/dL   03/31/2025 0.4 0.1 - 1.0 mg/dL Final     Comment:     For infants and newborns, interpretation of results should be based   on gestational age, weight and in agreement with clinical   observations.    Premature Infant recommended reference ranges:   0-24 hours:  <8.0 mg/dL   24-48 hours: <12.0 mg/dL   3-5 days:    <15.0 mg/dL   6-29 days:   <15.0 mg/dL     AST   Date Value Ref Range Status   04/16/2025 47 (H) 11 - 45 unit/L Final   04/07/2025 43 11 - 45 unit/L Final   03/31/2025 28 11 - 45 unit/L Final   03/17/2025 31 10 - 40 U/L Final   03/10/2025 23 10 - 40 U/L Final   03/03/2025 26 10 - 40 U/L Final     ALT   Date Value Ref Range Status   04/16/2025 51 (H) 10 - 44 unit/L Final   04/07/2025 45 (H) 10 - 44 unit/L Final   03/31/2025 27 10 - 44 unit/L Final   03/17/2025 35 10 - 44 U/L Final   03/10/2025 31 10 - 44 U/L Final   03/03/2025 36 10 - 44 U/L Final             Terese Cantor, PharmD  Clinical Pharmacy Specialist, Bone Marrow Transplant/Hematology  Spectra link: [phone]

## 2025-04-21 ENCOUNTER — LAB VISIT (OUTPATIENT)
Dept: LAB | Facility: HOSPITAL | Age: 70
End: 2025-04-21
Attending: INTERNAL MEDICINE
Payer: MEDICARE

## 2025-04-21 DIAGNOSIS — Z76.82 STEM CELL TRANSPLANT CANDIDATE: ICD-10-CM

## 2025-04-21 DIAGNOSIS — G25.81 RESTLESS LEG SYNDROME: ICD-10-CM

## 2025-04-21 DIAGNOSIS — D46.9 MYELODYSPLASTIC SYNDROME: ICD-10-CM

## 2025-04-21 LAB
ALBUMIN SERPL BCP-MCNC: 3.6 G/DL (ref 3.5–5.2)
ALP SERPL-CCNC: 74 UNIT/L (ref 40–150)
ALT SERPL W/O P-5'-P-CCNC: 40 UNIT/L (ref 10–44)
ANION GAP (OHS): 11 MMOL/L (ref 8–16)
AST SERPL-CCNC: 38 UNIT/L (ref 11–45)
BILIRUB SERPL-MCNC: 0.6 MG/DL (ref 0.1–1)
BUN SERPL-MCNC: 11 MG/DL (ref 8–23)
CALCIUM SERPL-MCNC: 9 MG/DL (ref 8.7–10.5)
CHLORIDE SERPL-SCNC: 106 MMOL/L (ref 95–110)
CO2 SERPL-SCNC: 24 MMOL/L (ref 23–29)
CREAT SERPL-MCNC: 1.2 MG/DL (ref 0.5–1.4)
GFR SERPLBLD CREATININE-BSD FMLA CKD-EPI: >60 ML/MIN/1.73/M2
GLUCOSE SERPL-MCNC: 131 MG/DL (ref 70–110)
MAGNESIUM SERPL-MCNC: 1.4 MG/DL (ref 1.6–2.6)
PHOSPHATE SERPL-MCNC: 3.2 MG/DL (ref 2.7–4.5)
POTASSIUM SERPL-SCNC: 4 MMOL/L (ref 3.5–5.1)
PROT SERPL-MCNC: 5.8 GM/DL (ref 6–8.4)
SODIUM SERPL-SCNC: 141 MMOL/L (ref 136–145)

## 2025-04-21 PROCEDURE — 83735 ASSAY OF MAGNESIUM: CPT

## 2025-04-21 PROCEDURE — 80053 COMPREHEN METABOLIC PANEL: CPT

## 2025-04-21 PROCEDURE — 87799 DETECT AGENT NOS DNA QUANT: CPT

## 2025-04-21 PROCEDURE — 80197 ASSAY OF TACROLIMUS: CPT

## 2025-04-21 PROCEDURE — 36415 COLL VENOUS BLD VENIPUNCTURE: CPT

## 2025-04-21 PROCEDURE — 84100 ASSAY OF PHOSPHORUS: CPT

## 2025-04-21 RX ORDER — GABAPENTIN 300 MG/1
600 CAPSULE ORAL NIGHTLY PRN
Qty: 60 CAPSULE | Refills: 2 | Status: SHIPPED | OUTPATIENT
Start: 2025-04-21

## 2025-04-22 ENCOUNTER — DOCUMENTATION ONLY (OUTPATIENT)
Dept: HEMATOLOGY/ONCOLOGY | Facility: CLINIC | Age: 70
End: 2025-04-22
Payer: MEDICARE

## 2025-04-22 ENCOUNTER — RESULTS FOLLOW-UP (OUTPATIENT)
Dept: HEMATOLOGY/ONCOLOGY | Facility: CLINIC | Age: 70
End: 2025-04-22

## 2025-04-22 ENCOUNTER — CLINICAL SUPPORT (OUTPATIENT)
Dept: REHABILITATION | Facility: HOSPITAL | Age: 70
End: 2025-04-22
Payer: MEDICARE

## 2025-04-22 ENCOUNTER — TELEPHONE (OUTPATIENT)
Dept: ENDOSCOPY | Facility: HOSPITAL | Age: 70
End: 2025-04-22
Payer: MEDICARE

## 2025-04-22 VITALS — WEIGHT: 142 LBS | BODY MASS INDEX: 21.03 KG/M2 | HEIGHT: 69 IN

## 2025-04-22 VITALS — OXYGEN SATURATION: 96 % | SYSTOLIC BLOOD PRESSURE: 112 MMHG | HEART RATE: 80 BPM | DIASTOLIC BLOOD PRESSURE: 78 MMHG

## 2025-04-22 DIAGNOSIS — Z74.09 DECREASED STRENGTH, ENDURANCE, AND MOBILITY: Primary | ICD-10-CM

## 2025-04-22 DIAGNOSIS — D89.813 GVHD (GRAFT VERSUS HOST DISEASE): Primary | ICD-10-CM

## 2025-04-22 DIAGNOSIS — R68.89 DECREASED STRENGTH, ENDURANCE, AND MOBILITY: Primary | ICD-10-CM

## 2025-04-22 DIAGNOSIS — R53.1 DECREASED STRENGTH, ENDURANCE, AND MOBILITY: Primary | ICD-10-CM

## 2025-04-22 LAB
CYTOMEGALOVIRUS DNA, QUAL (OHS): NOT DETECTED
EPSTEIN-BARR VIRUS DNA, QUAL (OHS): NORMAL
TACROLIMUS BLD-MCNC: 9.7 NG/ML (ref 5–15)

## 2025-04-22 PROCEDURE — 97530 THERAPEUTIC ACTIVITIES: CPT | Mod: PN,CQ

## 2025-04-22 NOTE — PROGRESS NOTES
"  Outpatient Rehab    Physical Therapy Visit    Patient Name: Guillaume Salinas  MRN: [id number]  YOB: 1955  Encounter Date: 4/22/2025    Therapy Diagnosis:   Encounter Diagnosis   Name Primary?    Decreased strength, endurance, and mobility Yes     Physician: Mohit Hickey MD    Physician Orders: Eval and Treat  Medical Diagnosis: S/P allogeneic bone marrow transplant    Visit # / Visits Authorized:  11 / 20  Insurance Authorization Period: 1/1/2025 to 12/31/2025  Date of Evaluation: 12/20/24  Plan of Care Certification: 12/09/2025      PT/PTA: PTA   Number of PTA visits since last PT visit:1  Time In: 1500   Time Out: 1545  Total Time: 45   Total Billable Time: 45    FOTO:  Intake Score:  %  Survey Score 1:  %  Survey Score 2:  %         Subjective   reports feeling naseous and without appetite. " i feel a bit wea today". Agreeable to light PT session..         Objective   Vital Signs  /78   Pulse 80   SpO2 96%   BP Location: Left arm  BP Position: Sitting  BP Cuff Size: Adult               Treatment:  Therapeutic Activity  TA 1: SLR 2x10 B  TA 2: SAQ 2# 3x10 B  TA 3: Hip adduction 5"x20 B  TA 4: Nustep x 8 min level 2.5    Time Entry(in minutes):  Therapeutic Activity Time Entry: 45    Assessment & Plan   Assessment: Limited session today due to pt's level of fatigue and reports of nausea. He did agree to perform as much as he could, which resulted in no adverse reactions. He did exhibit episodes of general fatigue, granted rest breaks for recovery. He was able to perform Nustep today for light cardiovascular endurance.  Evaluation/Treatment Tolerance: Patient limited by fatigue    Patient will continue to benefit from skilled outpatient physical therapy to address the deficits listed in the problem list box on initial evaluation, provide pt/family education and to maximize pt's level of independence in the home and community environment.     Patient's spiritual, cultural, and " educational needs considered and patient agreeable to plan of care and goals.           Plan: cont to progress PT as appropriate.    Goals:     Julian Morrow, IVAN

## 2025-04-22 NOTE — TELEPHONE ENCOUNTER
Referral for procedure from  [facility] referral (see Appts tab)      Spoke to pt to schedule procedure(s) Upper Endoscopy (EGD)       Physician to perform procedure(s) Dr. Toth  Date of Procedure (s) 05/08/25  Arrival Time 11:45 AM  Time of Procedure(s) 12:45 PM   Location of Procedure(s) [place] [address] Floor  Type of Rx Prep sent to patient: N/A  Instructions provided to patient via [facility]    Patient was informed on the following information and verbalized understanding. Screening questionnaire reviewed with patient and complete. If procedure requires anesthesia, a responsible adult needs to be present to accompany the patient home, patient cannot drive after receiving anesthesia. Appointment details are tentative, especially check-in time. Patient will receive a prep-op call 7 days prior to confirm check-in time for procedure. If applicable the patient should contact their pharmacy to verify Rx for procedure prep is ready for pick-up. Patient was advised to call the scheduling department at [phone] if pharmacy states no Rx is available. Patient was advised to call the endoscopy scheduling department if any questions or concerns arise.      [facility] Scheduling Department

## 2025-04-22 NOTE — PROGRESS NOTES
BMT Pharmacist Immunosuppression Note:    Reviewed patient's tacrolimus level with Dr. Hickey and it is within goal range.      Current regimen: 0.5 mg qAM    Plan: Continue current regimen.        Lab Results   Component Value Date    TACROLIMUS 9.7 04/21/2025    TACROLIMUS 15.1 (H) 04/16/2025    TACROLIMUS 15.7 (H) 04/07/2025       Creatinine   Date Value Ref Range Status   04/21/2025 1.2 0.5 - 1.4 mg/dL Final   04/17/2025 1.4 0.5 - 1.4 mg/dL Final   04/16/2025 1.4 0.5 - 1.4 mg/dL Final     Total Bilirubin   Date Value Ref Range Status   03/17/2025 0.5 0.1 - 1.0 mg/dL Final     Comment:     For infants and newborns, interpretation of results should be based  on gestational age, weight and in agreement with clinical  observations.    Premature Infant recommended reference ranges:  Up to 24 hours.............<8.0 mg/dL  Up to 48 hours............<12.0 mg/dL  3-5 days..................<15.0 mg/dL  6-29 days.................<15.0 mg/dL     03/10/2025 0.4 0.1 - 1.0 mg/dL Final     Comment:     For infants and newborns, interpretation of results should be based  on gestational age, weight and in agreement with clinical  observations.    Premature Infant recommended reference ranges:  Up to 24 hours.............<8.0 mg/dL  Up to 48 hours............<12.0 mg/dL  3-5 days..................<15.0 mg/dL  6-29 days.................<15.0 mg/dL     03/03/2025 0.5 0.1 - 1.0 mg/dL Final     Comment:     For infants and newborns, interpretation of results should be based  on gestational age, weight and in agreement with clinical  observations.    Premature Infant recommended reference ranges:  Up to 24 hours.............<8.0 mg/dL  Up to 48 hours............<12.0 mg/dL  3-5 days..................<15.0 mg/dL  6-29 days.................<15.0 mg/dL       Bilirubin Total   Date Value Ref Range Status   04/21/2025 0.6 0.1 - 1.0 mg/dL Final     Comment:     For infants and newborns, interpretation of results should be based   on  gestational age, weight and in agreement with clinical   observations.    Premature Infant recommended reference ranges:   0-24 hours:  <8.0 mg/dL   24-48 hours: <12.0 mg/dL   3-5 days:    <15.0 mg/dL   6-29 days:   <15.0 mg/dL   04/17/2025 0.5 0.1 - 1.0 mg/dL Final     Comment:     For infants and newborns, interpretation of results should be based   on gestational age, weight and in agreement with clinical   observations.    Premature Infant recommended reference ranges:   0-24 hours:  <8.0 mg/dL   24-48 hours: <12.0 mg/dL   3-5 days:    <15.0 mg/dL   6-29 days:   <15.0 mg/dL   04/16/2025 0.5 0.1 - 1.0 mg/dL Final     Comment:     For infants and newborns, interpretation of results should be based   on gestational age, weight and in agreement with clinical   observations.    Premature Infant recommended reference ranges:   0-24 hours:  <8.0 mg/dL   24-48 hours: <12.0 mg/dL   3-5 days:    <15.0 mg/dL   6-29 days:   <15.0 mg/dL     AST   Date Value Ref Range Status   04/21/2025 38 11 - 45 unit/L Final   04/17/2025 42 11 - 45 unit/L Final   04/16/2025 47 (H) 11 - 45 unit/L Final   03/17/2025 31 10 - 40 U/L Final   03/10/2025 23 10 - 40 U/L Final   03/03/2025 26 10 - 40 U/L Final     ALT   Date Value Ref Range Status   04/21/2025 40 10 - 44 unit/L Final   04/17/2025 48 (H) 10 - 44 unit/L Final   04/16/2025 51 (H) 10 - 44 unit/L Final   03/17/2025 35 10 - 44 U/L Final   03/10/2025 31 10 - 44 U/L Final   03/03/2025 36 10 - 44 U/L Final             Judy Zheng, PharmD  PGY2 Oncology Pharmacy Resident

## 2025-04-23 ENCOUNTER — OFFICE VISIT (OUTPATIENT)
Dept: HEMATOLOGY/ONCOLOGY | Facility: CLINIC | Age: 70
End: 2025-04-23
Payer: MEDICARE

## 2025-04-23 ENCOUNTER — LAB VISIT (OUTPATIENT)
Dept: LAB | Facility: HOSPITAL | Age: 70
End: 2025-04-23
Attending: INTERNAL MEDICINE
Payer: MEDICARE

## 2025-04-23 VITALS
TEMPERATURE: 98 F | WEIGHT: 140.31 LBS | DIASTOLIC BLOOD PRESSURE: 74 MMHG | RESPIRATION RATE: 18 BRPM | HEART RATE: 72 BPM | SYSTOLIC BLOOD PRESSURE: 124 MMHG | BODY MASS INDEX: 20.78 KG/M2 | OXYGEN SATURATION: 98 % | HEIGHT: 69 IN

## 2025-04-23 DIAGNOSIS — R35.0 URINARY FREQUENCY: ICD-10-CM

## 2025-04-23 DIAGNOSIS — D89.813 GVHD (GRAFT VERSUS HOST DISEASE): ICD-10-CM

## 2025-04-23 DIAGNOSIS — F41.9 ANXIETY: ICD-10-CM

## 2025-04-23 DIAGNOSIS — T45.1X5A CHEMOTHERAPY-INDUCED NAUSEA: ICD-10-CM

## 2025-04-23 DIAGNOSIS — Z94.81 S/P ALLOGENEIC BONE MARROW TRANSPLANT: Primary | ICD-10-CM

## 2025-04-23 DIAGNOSIS — G47.00 INSOMNIA, UNSPECIFIED TYPE: ICD-10-CM

## 2025-04-23 DIAGNOSIS — R11.0 CHEMOTHERAPY-INDUCED NAUSEA: ICD-10-CM

## 2025-04-23 DIAGNOSIS — D46.9 MYELODYSPLASTIC SYNDROME: ICD-10-CM

## 2025-04-23 DIAGNOSIS — Z76.82 STEM CELL TRANSPLANT CANDIDATE: ICD-10-CM

## 2025-04-23 DIAGNOSIS — G25.81 RESTLESS LEG SYNDROME: ICD-10-CM

## 2025-04-23 DIAGNOSIS — Z94.84 IMMUNOCOMPROMISED STATE ASSOCIATED WITH STEM CELL TRANSPLANT: ICD-10-CM

## 2025-04-23 DIAGNOSIS — D61.818 PANCYTOPENIA: ICD-10-CM

## 2025-04-23 DIAGNOSIS — D84.822 IMMUNOCOMPROMISED STATE ASSOCIATED WITH STEM CELL TRANSPLANT: ICD-10-CM

## 2025-04-23 LAB
ABSOLUTE EOSINOPHIL (OHS): 0.07 K/UL
ABSOLUTE MONOCYTE (OHS): 0.57 K/UL (ref 0.3–1)
ABSOLUTE NEUTROPHIL COUNT (OHS): 1.52 K/UL (ref 1.8–7.7)
ALBUMIN SERPL BCP-MCNC: 3.5 G/DL (ref 3.5–5.2)
ALP SERPL-CCNC: 72 UNIT/L (ref 40–150)
ALT SERPL W/O P-5'-P-CCNC: 33 UNIT/L (ref 10–44)
ANION GAP (OHS): 8 MMOL/L (ref 8–16)
AST SERPL-CCNC: 37 UNIT/L (ref 11–45)
BASOPHILS # BLD AUTO: 0.01 K/UL
BASOPHILS NFR BLD AUTO: 0.4 %
BILIRUB SERPL-MCNC: 0.6 MG/DL (ref 0.1–1)
BUN SERPL-MCNC: 12 MG/DL (ref 8–23)
CALCIUM SERPL-MCNC: 9.3 MG/DL (ref 8.7–10.5)
CHLORIDE SERPL-SCNC: 106 MMOL/L (ref 95–110)
CO2 SERPL-SCNC: 27 MMOL/L (ref 23–29)
CREAT SERPL-MCNC: 1.3 MG/DL (ref 0.5–1.4)
ERYTHROCYTE [DISTWIDTH] IN BLOOD BY AUTOMATED COUNT: 14 % (ref 11.5–14.5)
GFR SERPLBLD CREATININE-BSD FMLA CKD-EPI: 59 ML/MIN/1.73/M2
GLUCOSE SERPL-MCNC: 87 MG/DL (ref 70–110)
HCT VFR BLD AUTO: 30.6 % (ref 40–54)
HGB BLD-MCNC: 10.5 GM/DL (ref 14–18)
IMM GRANULOCYTES # BLD AUTO: 0.07 K/UL (ref 0–0.04)
IMM GRANULOCYTES NFR BLD AUTO: 2.6 % (ref 0–0.5)
INDIRECT COOMBS: NORMAL
LDH SERPL-CCNC: 247 U/L (ref 110–260)
LYMPHOCYTES # BLD AUTO: 0.45 K/UL (ref 1–4.8)
MAGNESIUM SERPL-MCNC: 1.5 MG/DL (ref 1.6–2.6)
MCH RBC QN AUTO: 38.2 PG (ref 27–31)
MCHC RBC AUTO-ENTMCNC: 34.3 G/DL (ref 32–36)
MCV RBC AUTO: 111 FL (ref 82–98)
NUCLEATED RBC (/100WBC) (OHS): 0 /100 WBC
PHOSPHATE SERPL-MCNC: 3.5 MG/DL (ref 2.7–4.5)
PLATELET # BLD AUTO: 60 K/UL (ref 150–450)
PLATELET BLD QL SMEAR: ABNORMAL
PMV BLD AUTO: 11.8 FL (ref 9.2–12.9)
POTASSIUM SERPL-SCNC: 4 MMOL/L (ref 3.5–5.1)
PROT SERPL-MCNC: 5.7 GM/DL (ref 6–8.4)
RBC # BLD AUTO: 2.75 M/UL (ref 4.6–6.2)
RELATIVE EOSINOPHIL (OHS): 2.6 %
RELATIVE LYMPHOCYTE (OHS): 16.7 % (ref 18–48)
RELATIVE MONOCYTE (OHS): 21.2 % (ref 4–15)
RELATIVE NEUTROPHIL (OHS): 56.5 % (ref 38–73)
RH BLD: NORMAL
SODIUM SERPL-SCNC: 141 MMOL/L (ref 136–145)
SPECIMEN OUTDATE: NORMAL
URATE SERPL-MCNC: 5.6 MG/DL (ref 3.4–7)
WBC # BLD AUTO: 2.69 K/UL (ref 3.9–12.7)

## 2025-04-23 PROCEDURE — 80197 ASSAY OF TACROLIMUS: CPT

## 2025-04-23 PROCEDURE — 36415 COLL VENOUS BLD VENIPUNCTURE: CPT

## 2025-04-23 PROCEDURE — 86901 BLOOD TYPING SEROLOGIC RH(D): CPT | Performed by: INTERNAL MEDICINE

## 2025-04-23 PROCEDURE — 82435 ASSAY OF BLOOD CHLORIDE: CPT

## 2025-04-23 PROCEDURE — 84100 ASSAY OF PHOSPHORUS: CPT

## 2025-04-23 PROCEDURE — 3078F DIAST BP <80 MM HG: CPT | Mod: CPTII,S$GLB,,

## 2025-04-23 PROCEDURE — 84550 ASSAY OF BLOOD/URIC ACID: CPT

## 2025-04-23 PROCEDURE — 1159F MED LIST DOCD IN RCRD: CPT | Mod: CPTII,S$GLB,,

## 2025-04-23 PROCEDURE — 87799 DETECT AGENT NOS DNA QUANT: CPT

## 2025-04-23 PROCEDURE — 99215 OFFICE O/P EST HI 40 MIN: CPT | Mod: S$GLB,,,

## 2025-04-23 PROCEDURE — 83615 LACTATE (LD) (LDH) ENZYME: CPT

## 2025-04-23 PROCEDURE — 3008F BODY MASS INDEX DOCD: CPT | Mod: CPTII,S$GLB,,

## 2025-04-23 PROCEDURE — 1125F AMNT PAIN NOTED PAIN PRSNT: CPT | Mod: CPTII,S$GLB,,

## 2025-04-23 PROCEDURE — 85025 COMPLETE CBC W/AUTO DIFF WBC: CPT

## 2025-04-23 PROCEDURE — 3288F FALL RISK ASSESSMENT DOCD: CPT | Mod: CPTII,S$GLB,,

## 2025-04-23 PROCEDURE — 1101F PT FALLS ASSESS-DOCD LE1/YR: CPT | Mod: CPTII,S$GLB,,

## 2025-04-23 PROCEDURE — 83735 ASSAY OF MAGNESIUM: CPT

## 2025-04-23 PROCEDURE — 3074F SYST BP LT 130 MM HG: CPT | Mod: CPTII,S$GLB,,

## 2025-04-23 PROCEDURE — 99999 PR PBB SHADOW E&M-EST. PATIENT-LVL IV: CPT | Mod: PBBFAC,,,

## 2025-04-23 NOTE — PROGRESS NOTES
Section of Hematology and Stem Cell Transplantation    Post-Transplantation Follow Up Visit       Notes:    04/23/2025    Transplant History:   Primary oncologist: Paco Hickey MD  Primary oncologic diagnosis: MDS  Transplant date: 9/19/2024  Donor: haploidentical  Blood Type (Patient): B +  Blood Type (Donor): A +  CMV (Patient): Positive  CMV (Donor): Positive  Graft source: Bone marrow  CD34+ cell dose: 3.55x10^6  Conditioning Regimen: Fludarabine plus melphalan 100 mg/m2 + 2Gy TBI  GVHD prophylaxis: Post-transplant cyclophosphamide, Tacrolimus, MMF  Immediate post-transplant complications: Patient experienced expected GI toxicities with C diff negative diarrhea and nausea, neutropenic fever with negative infectious work up, expected cytopenias requiring transfusions, electrolyte abnormalities requiring replacement, volume overload requiring diuresis, intermittent SOB from pulmonary edema requiring 02, and a hemorrhoid flare up. Diarrhea and SOB improved towards the end of the hospital stay.     History of Present Ilness:   Guillaume Salinas (Guillaume) is a pleasant 69 y.o.male with a past medical history of MDS who is status post haploidentical stem cell transplantation conditioned with FluMel 100 + PTCy+ 2Gy TBI who is currently day +216  who presents for post-transplant follow up. His daughter translated today, they denied our  services.     Interval History:   Patient presents for routine follow-up post allogeneic transplant. He has had nausea pretty consistently since 4/17/25 which is causing low appetite. He takes zofran in the morning and night. He has not been taking budesonide. His facial rash is looking better and is not as pruritic. No diarrhea or new skin changes. He is back in physical therapy.  He is still experiencing urinating frequency and will see urology.     PAST MEDICAL HISTORY:   Past Medical History:   Diagnosis Date    Anticoagulant long-term use     Coronary artery  disease     Hypertension     Myelodysplastic syndrome     Peripheral vascular disease, unspecified        PAST SURGICAL HISTORY:   Past Surgical History:   Procedure Laterality Date    BONE MARROW BIOPSY Left 2023    Procedure: Biopsy-bone marrow;  Surgeon: Harry Diamond MD;  Location: [facility] OR;  Service: Oncology;  Laterality: Left;    COLONOSCOPY N/A 2022    Procedure: COLONOSCOPY Golytely Vaccinated will bring cards;  Surgeon: Dereje Simon MD;  Location: [facility] ENDO;  Service: Endoscopy;  Laterality: N/A;  Do not cancel this order    INSERTION OF LEMONS CATHETER Right 2024    Procedure: INSERTION, CATHETER, CENTRAL VENOUS, LEMONS TRIPLE LUMEN;  Surgeon: gK Patten MD;  Location: [address];  Service: General;  Laterality: Right;     PAST SOCIAL HISTORY:  Social History     Socioeconomic History    Marital status:    Tobacco Use    Smoking status: Former     Current packs/day: 0.00     Average packs/day: 0.3 packs/day for 50.0 years (12.5 ttl pk-yrs)     Types: Cigarettes     Start date: 3/1/1973     Quit date: 3/1/2023     Years since quittin.1     Passive exposure: Past    Smokeless tobacco: Never   Substance and Sexual Activity    Alcohol use: Not Currently    Drug use: Never    Sexual activity: Not Currently     Partners: Female     Social Drivers of Health     Financial Resource Strain: Patient Declined (2024)    Overall Financial Resource Strain (CARDIA)     Difficulty of Paying Living Expenses: Patient declined   Food Insecurity: Patient Declined (2024)    Hunger Vital Sign     Worried About Running Out of Food in the Last Year: Patient declined     Ran Out of Food in the Last Year: Patient declined   Transportation Needs: Patient Declined (2024)    TRANSPORTATION NEEDS     Transportation : Patient declined   Physical Activity: Inactive (1/10/2025)    Exercise Vital Sign     Days of Exercise per Week: 0 days     Minutes of Exercise  per Session: 10 min   Stress: Patient Declined (12/28/2024)    [employer] - Occupational Stress Questionnaire     Feeling of Stress : Patient declined   Recent Concern: Stress - Stress Concern Present (9/30/2024)    [employer] - Occupational Stress Questionnaire     Feeling of Stress : To some extent   Housing Stability: Patient Declined (12/28/2024)    Housing Stability Vital Sign     Unable to Pay for Housing in the Last Year: Patient declined     Homeless in the Last Year: Patient declined       FAMILY HISTORY:  Cancer-related family history includes Cancer in his brother and father.    CURRENT MEDICATIONS:   Medication List with Changes/Refills   Current Medications    ACYCLOVIR (ZOVIRAX) 800 MG TAB    Take 1 tablet (800 mg total) by mouth 2 (two) times daily.    ATOVAQUONE (MEPRON) 750 MG/5 ML SUSP ORAL LIQUID    Take 10 mLs (1,500 mg total) by mouth once daily.    BUDESONIDE (ENTOCORT EC) 3 MG CAPSULE    Take 1 capsule (3 mg total) by mouth 3 (three) times daily.    CARVEDILOL (COREG) 3.125 MG TABLET    Take 1 tablet (3.125 mg total) by mouth 2 (two) times daily.    CYANOCOBALAMIN 1,000 MCG/ML INJECTION    Inject 1 mL (1,000 mcg total) into the muscle once a week.    ELTROMBOPAG OLAMINE (PROMACTA) 50 MG TAB    Take 1 tablet (50 mg total) by mouth once daily.    FOLIC ACID (FOLVITE) 1 MG TABLET    Take 1 tablet (1 mg total) by mouth once daily.    GABAPENTIN (NEURONTIN) 300 MG CAPSULE    Take 2 capsules (600 mg total) by mouth nightly as needed (Restless leg).    HYDROCORTISONE 1 % CREAM    Apply to affected area 2 times daily    LETERMOVIR (PREVYMIS) 480 MG TAB    Take 1 tablet (480 mg total) by mouth Daily.    LEVOFLOXACIN (LEVAQUIN) 500 MG TABLET    Take 1 tablet (500 mg total) by mouth once daily.    LOPERAMIDE (IMODIUM A-D) 2 MG TAB    Take 2 mg by mouth 4 (four) times daily as needed.    MAGNESIUM OXIDE (MAG-OX) 400 MG (241.3 MG MAGNESIUM) TABLET     Take 2 tablets (800 mg total) by mouth 2 (two) times daily.    ONDANSETRON (ZOFRAN-ODT) 8 MG TBDL    Take 1 tablet (8 mg total) by mouth every 8 (eight) hours as needed (nausea).    ONDANSETRON (ZOFRAN-ODT) 8 MG TBDL    Take 1 tablet (8 mg total) by mouth every 8 (eight) hours as needed (nausea/vomiting).    PANTOPRAZOLE (PROTONIX) 40 MG TABLET    Take 1 tablet (40 mg total) by mouth once daily.    POSACONAZOLE (NOXAFIL) 100 MG TBEC TABLET    Take 3 tablets (300 mg total) by mouth once daily.    POSACONAZOLE (NOXAFIL) 100 MG TBEC TABLET    Take 3 tablets (300 mg total) by mouth once daily.    ROPINIROLE (REQUIP) 0.5 MG TABLET    Take 1 tablet (0.5 mg total) by mouth every evening.    TACROLIMUS (PROGRAF) 0.5 MG CAP    Take 1 capsule (0.5 mg total) by mouth every morning.    ZOLPIDEM (AMBIEN) 5 MG TAB    Take 1 tablet (5 mg total) by mouth nightly as needed (insomnia).       ALLERGIES:   Review of patient's allergies indicates:  No Known Allergies    GVHD Review of Systems:     Pertinent positives and negatives included in the HPI. Otherwise a 14 point review of systems is negative. GVHD review of systems recorded in BMT flowsheet.     Physical Exam:     Vitals:    04/23/25 1614   BP: 124/74   Pulse: 72   Resp: 18   Temp: 98 °F (36.7 °C)           General: Appears well, NAD.   HEENT: MMM, no OP lesions  Pulmonary: CTAB, no increased work of breathing, no W/R/C  Cardiovascular: S1S2 normal, RRR, no M/R/G  Abdominal: Soft, NT, ND, BS+, no HSM  Extremities: No C/C/E  Neurological: AAOx4, grossly normal, no focal deficits  Dermatologic: GVHD face rash resolved. Dry/peeling skin noted to patient's forehead with improvement though.      ECOG Performance Status: (foot note - ECOG PS provided by Eastern Cooperative Oncology Group) 1 - Symptomatic but completely ambulatory    Karnofsky Performance Score:  80%- Normal Activity with Effort: Some Symptoms of Disease    Labs:   Lab Results   Component Value Date    WBC 2.69  (L) 04/23/2025    HGB 10.5 (L) 04/23/2025    HCT 30.6 (L) 04/23/2025     (H) 04/23/2025    PLT 60 (L) 04/23/2025        CMP  Sodium   Date Value Ref Range Status   04/23/2025 141 136 - 145 mmol/L Final   03/17/2025 140 136 - 145 mmol/L Final     Potassium   Date Value Ref Range Status   04/23/2025 4.0 3.5 - 5.1 mmol/L Final   03/17/2025 4.9 3.5 - 5.1 mmol/L Final     Chloride   Date Value Ref Range Status   04/23/2025 106 95 - 110 mmol/L Final   03/17/2025 105 95 - 110 mmol/L Final     CO2   Date Value Ref Range Status   04/23/2025 27 23 - 29 mmol/L Final   03/17/2025 26 23 - 29 mmol/L Final     Glucose   Date Value Ref Range Status   03/17/2025 109 70 - 110 mg/dL Final     BUN   Date Value Ref Range Status   04/23/2025 12 8 - 23 mg/dL Final     Creatinine   Date Value Ref Range Status   04/23/2025 1.3 0.5 - 1.4 mg/dL Final     Calcium   Date Value Ref Range Status   04/23/2025 9.3 8.7 - 10.5 mg/dL Final   03/17/2025 9.3 8.7 - 10.5 mg/dL Final     Total Protein   Date Value Ref Range Status   03/17/2025 6.1 6.0 - 8.4 g/dL Final     Albumin   Date Value Ref Range Status   04/23/2025 3.5 3.5 - 5.2 g/dL Final   03/17/2025 3.5 3.5 - 5.2 g/dL Final     Total Bilirubin   Date Value Ref Range Status   03/17/2025 0.5 0.1 - 1.0 mg/dL Final     Comment:     For infants and newborns, interpretation of results should be based  on gestational age, weight and in agreement with clinical  observations.    Premature Infant recommended reference ranges:  Up to 24 hours.............<8.0 mg/dL  Up to 48 hours............<12.0 mg/dL  3-5 days..................<15.0 mg/dL  6-29 days.................<15.0 mg/dL       Bilirubin Total   Date Value Ref Range Status   04/23/2025 0.6 0.1 - 1.0 mg/dL Final     Comment:     For infants and newborns, interpretation of results should be based   on gestational age, weight and in agreement with clinical   observations.    Premature Infant recommended reference ranges:   0-24 hours:  <8.0  mg/dL   24-48 hours: <12.0 mg/dL   3-5 days:    <15.0 mg/dL   6-29 days:   <15.0 mg/dL     Alkaline Phosphatase   Date Value Ref Range Status   03/17/2025 66 40 - 150 U/L Final     ALP   Date Value Ref Range Status   04/23/2025 72 40 - 150 unit/L Final     AST   Date Value Ref Range Status   04/23/2025 37 11 - 45 unit/L Final   03/17/2025 31 10 - 40 U/L Final     ALT   Date Value Ref Range Status   04/23/2025 33 10 - 44 unit/L Final   03/17/2025 35 10 - 44 U/L Final     Anion Gap   Date Value Ref Range Status   04/23/2025 8 8 - 16 mmol/L Final     eGFR   Date Value Ref Range Status   04/23/2025 59 (L) >60 mL/min/1.73/m2 Final     Comment:     Estimated GFR calculated using the CKD-EPI creatinine (2021) equation.   03/17/2025 >60 >60 mL/min/1.73 m^2 Final          Imaging:   Hospital imaging reviewed.    Pathology:  Prior pathology reviewed.     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                Assessment and Plan:   Guillaume Salinas (Guillaume) is a pleasant 69 y.o.male with a past medical history of MDS s/p haplo SCT who presents for post-transplant follow up.    MDS: Status post treatment with azacitidine 75 mg/m2 daily x7 days plus venetoclax 100mg (voriconazole) daily x14 of 28 days.Venetoclax decreased to 7 days with cycle 3 until completion of 11 cycles. No plans for maintenance at this time.     Status post allogeneic stem cell transplantation: As noted above, status post haploidentical stem cell transplantation conditioned with FluMel 100 + PTCy+ 2Gy TBI . Currently Day+ 216. Engrafted on 10/09/24 day +20.  Day 30 bone marrow (11/5/2024) showing mildly hypercellular marrow with no increased blast (0.3%) and no evidence of myeloid neoplasm. Pending chimerisms, NGS, and CG  Day 100 bone marrow biopsy on 12/31/24 showed 30-40% cellularity, erythroid hyperplasia, dyserythropoiesis, decreased megakaryocytes with atypical morphology. No hemophagocytosis  mentioned. NGS, FISH, and CG normal. 100% chimerisms.     3.    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 He started budesonide 3mg TID on 10/28/24. Due to persistent nausea despite budesonide so started systemic steroids 11/1 at 0.5 mg/kg and his steroid taper has been given to him. Steroid taper completed 12/8/24. No evidence of aGVHD today. 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 Taper completed. Patient's daughter sent messages through the portal about his nausea coming back with no improvement from zofran so budesonide 3mg TID was sent in on 2/6/25. Budesonide taper ended 3/13/25. Can consider tapering off tacrolimus in next few weeks if GVHD does not return. Skin gvhd present on face with erythema, inflammation, and sand paper feel on 3/14/25. Rash now improved on 4/7/25 with now only have some erythema likely from skin irration from hydrocortisone cream- cream stopped today. 4/23/25 Patient reports persistent nausea and appetite suppression; Budesonide 3mg TID started again; he has an EGD on 5/8/25.  A. Current tacro dose: 0.5 mg qAM  B. Last tacro level: 8.2  C. Adjustments: N/a    4.     Immunosuppression: Prevention with posaconazole, acyclovir. CMV prophylaxis with letermovir. PJP prophyalxis atovaquone. Continue weekly monitoring of CMV and EBV.  Last CMV: Not-detected,   last EBV: negative  Active infections: N/A    Pancytopenia: Due to underlying disease and chemotherapy. Transfuse for Hgb <7 g/dL and platelets <10k. Folate and copper deficient, on supplementation. Will continue to monitor to see if platelets begin to rise with his supplements. Promacta sent in on 12/9/24. Change TMP/SMX to atovaquone. Continue promacta and supplements listed below  Folic  Acid deficiency: Continue folic acid 1mg daily  Copper deficiency: Copper level of 635, continue copper gluconate 2mg daily   B12 deficiency: Patient will get his next b12 injection next appointment and continue monthly.     Post transplant lymphoproliferative disorder: Patient received rituxan on 12/30/24 due to possible PTLD with elevated EBV and possible HLH. Now EBV has improved.   EBV negative     Hypomagnesemia: Continue MagOx to 800mg twice daily.      Chemotherapy Induced Nausea: Resolved with steroids and the taper originally ended 12/8/24. He ended up having nausea again while hospitalized and was again started on steroid and taper ended 1/28/25. Patient's daughter sent messages through the portal about his nausea coming back with no improvement from zofran so budesonide 3mg TID was sent in on 2/6/25. Budesonide taper ended 3/13/25.  Message sent through portal on 4/17/25 by his daughter about him having nausea again. Advised EGD to determine if it is GVHD due to nausea being intermittent many times. EGD scheduled for 5/8/25.     Anxiety, Restless Leg Syndrome: Noted since discharge by family. He started ropinirole 0.5mg and has experienced significant improvement.  Symptoms have resolved. Continue Ropinirole    Insomnia: Patient now sleeping well and only waking up to urinate at night. Continue Ambien and Ropinirole for RLS.      Blood pressure abnormalities: For awhile patient had been holding off of his carvedilol due to hypotensions and dizziness. His pressures at home have been averaging 170s/90s so he started the carvedilol 6.25mg BID again. He began to complain of orthostatic hypotension symptoms when he takes the medication and BP in clinic is 105/55. His carvedilol was lowered to 3.125 daily. Today his blood pressure is normotensive. For now continue carvedilol 3.125mg BID and will monitor and make adjustments as needed.    Facial pruritus: Improving. Skin still appears dry. Will continue with  cerave moisturizer BID.    13. Urinary Frequency: Patient has been experiencing urinary frequency for many weeks now but denies dysuria and hematuria. Urinalysis  was negative. Referral for urology placed and appointment made for 5/5/25    Orders Placed:             Follow Up: Every other week with weekly labs       BMT Chart Routing      Follow up with physician 2 weeks. Paco Dillard independent   Follow up with [facility]    Provider visit type    Infusion scheduling note    Injection scheduling note    Labs CBC, CMP, magnesium and phosphorus   Scheduling:  Preferred lab:  Lab interval:     Imaging    Pharmacy appointment    Other referrals                   A total of 40 minutes was spent in pre-visit chart review, personal interpretation of labs and imaging, and medication review. Total visit time 35 minutes, >50 % counseling.       Gilma Ugalde PA-C  Malignant Hematology, Stem Cell Transplant, and Cellular Therapy  [facility]  [facility]

## 2025-04-24 ENCOUNTER — RESULTS FOLLOW-UP (OUTPATIENT)
Dept: HEMATOLOGY/ONCOLOGY | Facility: CLINIC | Age: 70
End: 2025-04-24

## 2025-04-24 LAB
CYTOMEGALOVIRUS DNA, QUAL (OHS): NOT DETECTED
EPSTEIN-BARR VIRUS DNA, QUAL (OHS): NORMAL
TACROLIMUS BLD-MCNC: 8.2 NG/ML (ref 5–15)

## 2025-04-24 NOTE — PROGRESS NOTES
BMT Pharmacist Immunosuppression Note:    Reviewed patient's tacrolimus level with Dr. Hickey and it is within goal range.      Current regimen: 0.5mg PO daily    Plan: Continue current regimen.        Lab Results   Component Value Date    TACROLIMUS 8.2 04/23/2025    TACROLIMUS 9.7 04/21/2025    TACROLIMUS 15.1 (H) 04/16/2025       Creatinine   Date Value Ref Range Status   04/23/2025 1.3 0.5 - 1.4 mg/dL Final   04/21/2025 1.2 0.5 - 1.4 mg/dL Final   04/17/2025 1.4 0.5 - 1.4 mg/dL Final     Total Bilirubin   Date Value Ref Range Status   03/17/2025 0.5 0.1 - 1.0 mg/dL Final     Comment:     For infants and newborns, interpretation of results should be based  on gestational age, weight and in agreement with clinical  observations.    Premature Infant recommended reference ranges:  Up to 24 hours.............<8.0 mg/dL  Up to 48 hours............<12.0 mg/dL  3-5 days..................<15.0 mg/dL  6-29 days.................<15.0 mg/dL     03/10/2025 0.4 0.1 - 1.0 mg/dL Final     Comment:     For infants and newborns, interpretation of results should be based  on gestational age, weight and in agreement with clinical  observations.    Premature Infant recommended reference ranges:  Up to 24 hours.............<8.0 mg/dL  Up to 48 hours............<12.0 mg/dL  3-5 days..................<15.0 mg/dL  6-29 days.................<15.0 mg/dL     03/03/2025 0.5 0.1 - 1.0 mg/dL Final     Comment:     For infants and newborns, interpretation of results should be based  on gestational age, weight and in agreement with clinical  observations.    Premature Infant recommended reference ranges:  Up to 24 hours.............<8.0 mg/dL  Up to 48 hours............<12.0 mg/dL  3-5 days..................<15.0 mg/dL  6-29 days.................<15.0 mg/dL       Bilirubin Total   Date Value Ref Range Status   04/23/2025 0.6 0.1 - 1.0 mg/dL Final     Comment:     For infants and newborns, interpretation of results should be based   on  gestational age, weight and in agreement with clinical   observations.    Premature Infant recommended reference ranges:   0-24 hours:  <8.0 mg/dL   24-48 hours: <12.0 mg/dL   3-5 days:    <15.0 mg/dL   6-29 days:   <15.0 mg/dL   04/21/2025 0.6 0.1 - 1.0 mg/dL Final     Comment:     For infants and newborns, interpretation of results should be based   on gestational age, weight and in agreement with clinical   observations.    Premature Infant recommended reference ranges:   0-24 hours:  <8.0 mg/dL   24-48 hours: <12.0 mg/dL   3-5 days:    <15.0 mg/dL   6-29 days:   <15.0 mg/dL   04/17/2025 0.5 0.1 - 1.0 mg/dL Final     Comment:     For infants and newborns, interpretation of results should be based   on gestational age, weight and in agreement with clinical   observations.    Premature Infant recommended reference ranges:   0-24 hours:  <8.0 mg/dL   24-48 hours: <12.0 mg/dL   3-5 days:    <15.0 mg/dL   6-29 days:   <15.0 mg/dL     AST   Date Value Ref Range Status   04/23/2025 37 11 - 45 unit/L Final   04/21/2025 38 11 - 45 unit/L Final   04/17/2025 42 11 - 45 unit/L Final   03/17/2025 31 10 - 40 U/L Final   03/10/2025 23 10 - 40 U/L Final   03/03/2025 26 10 - 40 U/L Final     ALT   Date Value Ref Range Status   04/23/2025 33 10 - 44 unit/L Final   04/21/2025 40 10 - 44 unit/L Final   04/17/2025 48 (H) 10 - 44 unit/L Final   03/17/2025 35 10 - 44 U/L Final   03/10/2025 31 10 - 44 U/L Final   03/03/2025 36 10 - 44 U/L Final             Sanjuana Poe, Pharm.D., BCOP  Clinical Pharmacy Specialist, Bone Marrow Transplant/Cellular Therapy  [facility]  SpectraLink: [phone]

## 2025-04-25 ENCOUNTER — CLINICAL SUPPORT (OUTPATIENT)
Dept: REHABILITATION | Facility: HOSPITAL | Age: 70
End: 2025-04-25
Payer: MEDICARE

## 2025-04-25 DIAGNOSIS — R68.89 DECREASED STRENGTH, ENDURANCE, AND MOBILITY: Primary | ICD-10-CM

## 2025-04-25 DIAGNOSIS — R53.1 DECREASED STRENGTH, ENDURANCE, AND MOBILITY: Primary | ICD-10-CM

## 2025-04-25 DIAGNOSIS — Z74.09 DECREASED STRENGTH, ENDURANCE, AND MOBILITY: Primary | ICD-10-CM

## 2025-04-25 PROCEDURE — 97530 THERAPEUTIC ACTIVITIES: CPT | Mod: PN,CQ

## 2025-04-25 PROCEDURE — 97112 NEUROMUSCULAR REEDUCATION: CPT | Mod: PN,CQ

## 2025-04-25 NOTE — PROGRESS NOTES
"  Outpatient Rehab    Physical Therapy Visit    Patient Name: Guillaume Salinas  MRN: [id number]  YOB: 1955  Encounter Date: 4/25/2025    Therapy Diagnosis:   Encounter Diagnosis   Name Primary?    Decreased strength, endurance, and mobility Yes     Physician: Mohit Hickey MD    Physician Orders: Eval and Treat  Medical Diagnosis: S/P allogeneic bone marrow transplant    Visit # / Visits Authorized:  13 / 20  Insurance Authorization Period: 1/1/2025 to 12/31/2025  Date of Evaluation: 12/20/24  Plan of Care Certification: 12/09/2025      PT/PTA: PTA   Number of PTA visits since last PT visit:2  Time In: 1200   Time Out: 1255  Total Time: 55   Total Billable Time: 38    FOTO:  Intake Score:  %  Survey Score 1:  %  Survey Score 2:  %         Subjective   agreeable to PT session.         Objective            Treatment:  Balance/Neuromuscular Re-Education  NMR 1: LAQ 3x10 RTB  NMR 2: standing heel raises 2x10 B  NMR 3: standing hip abduction 2x10 B  Therapeutic Activity  TA 1: SLR 2x10 B  TA 2: SAQ 2# 3x10 B  TA 3: Hip adduction 5"x20 B  TA 4: Nustep x 8 min level 2.5  TA 5: Sidelying Clamshells 5"x15 B    Time Entry(in minutes):  Neuromuscular Re-Education Time Entry: 10  Therapeutic Activity Time Entry: 28    Assessment & Plan   Assessment: Pt completed today's session focusing on general LE strengthening as per logged in treatment. Pt did report minor complaints of nausea today, but improved since his pervious PT session. completed session with no adverse events, he was in good spirits.  Evaluation/Treatment Tolerance: Patient tolerated treatment well    Patient will continue to benefit from skilled outpatient physical therapy to address the deficits listed in the problem list box on initial evaluation, provide pt/family education and to maximize pt's level of independence in the home and community environment.     Patient's spiritual, cultural, and educational needs considered and patient " agreeable to plan of care and goals.           Plan: cont to progress PT as appropriate.    Goals: Goals: Short Term Goals: 6 weeks   Patient will be independent with Home exercise program to supplement therapy progressing, not met   Patient will be able to ambulate 1300 ft with 6 Minute walk test to improve endurance for community mobility  met  Patient will be able to lift and carry groceries without difficulty to improve ability to perform functional tasks  met     Long Term Goals: 12 weeks   Patient will be able to ambulate 1400 ft or more with 6 Minute walk test to improve endurance for community mobility progressing, not met  Patient will be able to perform 16 sit to stand on 30 second sit to  order to improve functional strength and endurance for transfers progressing, not met  Patient will improve FOTO score to 65 % to improve self perceived ability to perform functional tasks progressing, not met  Patient goal: Patient will be able to mow his lawn to return to prior level of function progressing, not met    Julian Morrow, PTA

## 2025-04-29 ENCOUNTER — CLINICAL SUPPORT (OUTPATIENT)
Dept: REHABILITATION | Facility: HOSPITAL | Age: 70
End: 2025-04-29
Payer: MEDICARE

## 2025-04-29 DIAGNOSIS — R53.1 DECREASED STRENGTH, ENDURANCE, AND MOBILITY: Primary | ICD-10-CM

## 2025-04-29 DIAGNOSIS — R68.89 DECREASED STRENGTH, ENDURANCE, AND MOBILITY: Primary | ICD-10-CM

## 2025-04-29 DIAGNOSIS — Z74.09 DECREASED STRENGTH, ENDURANCE, AND MOBILITY: Primary | ICD-10-CM

## 2025-04-29 PROCEDURE — 97112 NEUROMUSCULAR REEDUCATION: CPT | Mod: PN,CQ

## 2025-04-29 PROCEDURE — 97530 THERAPEUTIC ACTIVITIES: CPT | Mod: PN,CQ

## 2025-04-30 ENCOUNTER — LAB VISIT (OUTPATIENT)
Dept: LAB | Facility: HOSPITAL | Age: 70
End: 2025-04-30
Attending: INTERNAL MEDICINE
Payer: MEDICARE

## 2025-04-30 DIAGNOSIS — G47.00 INSOMNIA, UNSPECIFIED TYPE: ICD-10-CM

## 2025-04-30 DIAGNOSIS — Z76.82 STEM CELL TRANSPLANT CANDIDATE: ICD-10-CM

## 2025-04-30 DIAGNOSIS — D46.9 MYELODYSPLASTIC SYNDROME: ICD-10-CM

## 2025-04-30 DIAGNOSIS — Z94.81 S/P ALLOGENEIC BONE MARROW TRANSPLANT: ICD-10-CM

## 2025-04-30 DIAGNOSIS — R11.0 NAUSEA: ICD-10-CM

## 2025-04-30 LAB
ABSOLUTE NEUTROPHIL MANUAL (OHS): 2.9 K/UL
ALBUMIN SERPL BCP-MCNC: 3.4 G/DL (ref 3.5–5.2)
ALP SERPL-CCNC: 82 UNIT/L (ref 40–150)
ALT SERPL W/O P-5'-P-CCNC: 46 UNIT/L (ref 10–44)
ANION GAP (OHS): 9 MMOL/L (ref 8–16)
ANISOCYTOSIS BLD QL SMEAR: SLIGHT
AST SERPL-CCNC: 31 UNIT/L (ref 11–45)
BILIRUB SERPL-MCNC: 0.7 MG/DL (ref 0.1–1)
BUN SERPL-MCNC: 24 MG/DL (ref 8–23)
CALCIUM SERPL-MCNC: 9.3 MG/DL (ref 8.7–10.5)
CHLORIDE SERPL-SCNC: 106 MMOL/L (ref 95–110)
CO2 SERPL-SCNC: 25 MMOL/L (ref 23–29)
CREAT SERPL-MCNC: 1.1 MG/DL (ref 0.5–1.4)
DACRYOCYTES BLD QL SMEAR: ABNORMAL
ERYTHROCYTE [DISTWIDTH] IN BLOOD BY AUTOMATED COUNT: 14.4 % (ref 11.5–14.5)
GFR SERPLBLD CREATININE-BSD FMLA CKD-EPI: >60 ML/MIN/1.73/M2
GLUCOSE SERPL-MCNC: 153 MG/DL (ref 70–110)
HCT VFR BLD AUTO: 28.9 % (ref 40–54)
HGB BLD-MCNC: 9.9 GM/DL (ref 14–18)
HYPOCHROMIA BLD QL SMEAR: ABNORMAL
INDIRECT COOMBS: NORMAL
LDH SERPL-CCNC: 261 U/L (ref 110–260)
LYMPHOCYTES NFR BLD MANUAL: 6 % (ref 18–48)
MAGNESIUM SERPL-MCNC: 1.7 MG/DL (ref 1.6–2.6)
MCH RBC QN AUTO: 38.2 PG (ref 27–31)
MCHC RBC AUTO-ENTMCNC: 34.3 G/DL (ref 32–36)
MCV RBC AUTO: 112 FL (ref 82–98)
MONOCYTES NFR BLD MANUAL: 6 % (ref 4–15)
NEUTROPHILS NFR BLD MANUAL: 88 % (ref 38–73)
NUCLEATED RBC (/100WBC) (OHS): 0 /100 WBC
PHOSPHATE SERPL-MCNC: 2.9 MG/DL (ref 2.7–4.5)
PLATELET # BLD AUTO: 62 K/UL (ref 150–450)
PLATELET BLD QL SMEAR: ABNORMAL
PMV BLD AUTO: 12.1 FL (ref 9.2–12.9)
POIKILOCYTOSIS BLD QL SMEAR: SLIGHT
POLYCHROMASIA BLD QL SMEAR: ABNORMAL
POTASSIUM SERPL-SCNC: 4.1 MMOL/L (ref 3.5–5.1)
PROT SERPL-MCNC: 5.3 GM/DL (ref 6–8.4)
RBC # BLD AUTO: 2.59 M/UL (ref 4.6–6.2)
RH BLD: NORMAL
SODIUM SERPL-SCNC: 140 MMOL/L (ref 136–145)
SPECIMEN OUTDATE: NORMAL
URATE SERPL-MCNC: 4.1 MG/DL (ref 3.4–7)
WBC # BLD AUTO: 3.3 K/UL (ref 3.9–12.7)

## 2025-04-30 PROCEDURE — 87799 DETECT AGENT NOS DNA QUANT: CPT

## 2025-04-30 PROCEDURE — 83735 ASSAY OF MAGNESIUM: CPT

## 2025-04-30 PROCEDURE — 84550 ASSAY OF BLOOD/URIC ACID: CPT

## 2025-04-30 PROCEDURE — 84100 ASSAY OF PHOSPHORUS: CPT

## 2025-04-30 PROCEDURE — 36415 COLL VENOUS BLD VENIPUNCTURE: CPT

## 2025-04-30 PROCEDURE — 80053 COMPREHEN METABOLIC PANEL: CPT

## 2025-04-30 PROCEDURE — 86900 BLOOD TYPING SEROLOGIC ABO: CPT | Performed by: INTERNAL MEDICINE

## 2025-04-30 PROCEDURE — 83615 LACTATE (LD) (LDH) ENZYME: CPT

## 2025-04-30 PROCEDURE — 80197 ASSAY OF TACROLIMUS: CPT

## 2025-04-30 PROCEDURE — 85025 COMPLETE CBC W/AUTO DIFF WBC: CPT

## 2025-04-30 RX ORDER — ZOLPIDEM TARTRATE 5 MG/1
5 TABLET ORAL NIGHTLY PRN
Qty: 30 TABLET | Refills: 0 | Status: SHIPPED | OUTPATIENT
Start: 2025-04-30 | End: 2025-04-30 | Stop reason: SDUPTHER

## 2025-04-30 RX ORDER — BUDESONIDE 3 MG/1
3 CAPSULE, COATED PELLETS ORAL 3 TIMES DAILY
Qty: 90 CAPSULE | Refills: 2 | Status: SHIPPED | OUTPATIENT
Start: 2025-04-30 | End: 2026-04-30

## 2025-05-01 ENCOUNTER — RESULTS FOLLOW-UP (OUTPATIENT)
Dept: HEMATOLOGY/ONCOLOGY | Facility: CLINIC | Age: 70
End: 2025-05-01

## 2025-05-01 LAB
CYTOMEGALOVIRUS DNA, QUAL (OHS): NOT DETECTED
EPSTEIN-BARR VIRUS DNA, QUAL (OHS): NORMAL
TACROLIMUS BLD-MCNC: 4.5 NG/ML (ref 5–15)

## 2025-05-01 RX ORDER — ZOLPIDEM TARTRATE 5 MG/1
5 TABLET ORAL NIGHTLY PRN
Qty: 30 TABLET | Refills: 0 | Status: SHIPPED | OUTPATIENT
Start: 2025-05-01

## 2025-05-01 NOTE — PROGRESS NOTES
BMT Pharmacist Immunosuppression Note:    Reviewed patient's tacrolimus level with Dr. Hickey and even though it is on the low side, we are okay with continuing his current regimen as is.     Day +224  Renal function continues to improve with lower dose of tacrolimus.   Restarted on budesonide recently, with EGD scheduled for 5/8.     Current regimen: 0.5mg daily  Capsule size(s): 0.5mg    Plan: Continue current regimen.        Lab Results   Component Value Date    TACROLIMUS 4.5 (L) 04/30/2025    TACROLIMUS 8.2 04/23/2025    TACROLIMUS 9.7 04/21/2025       Creatinine   Date Value Ref Range Status   04/30/2025 1.1 0.5 - 1.4 mg/dL Final   04/23/2025 1.3 0.5 - 1.4 mg/dL Final   04/21/2025 1.2 0.5 - 1.4 mg/dL Final     Total Bilirubin   Date Value Ref Range Status   03/17/2025 0.5 0.1 - 1.0 mg/dL Final     Comment:     For infants and newborns, interpretation of results should be based  on gestational age, weight and in agreement with clinical  observations.    Premature Infant recommended reference ranges:  Up to 24 hours.............<8.0 mg/dL  Up to 48 hours............<12.0 mg/dL  3-5 days..................<15.0 mg/dL  6-29 days.................<15.0 mg/dL     03/10/2025 0.4 0.1 - 1.0 mg/dL Final     Comment:     For infants and newborns, interpretation of results should be based  on gestational age, weight and in agreement with clinical  observations.    Premature Infant recommended reference ranges:  Up to 24 hours.............<8.0 mg/dL  Up to 48 hours............<12.0 mg/dL  3-5 days..................<15.0 mg/dL  6-29 days.................<15.0 mg/dL     03/03/2025 0.5 0.1 - 1.0 mg/dL Final     Comment:     For infants and newborns, interpretation of results should be based  on gestational age, weight and in agreement with clinical  observations.    Premature Infant recommended reference ranges:  Up to 24 hours.............<8.0 mg/dL  Up to 48 hours............<12.0 mg/dL  3-5 days..................<15.0  mg/dL  6-29 days.................<15.0 mg/dL       Bilirubin Total   Date Value Ref Range Status   04/30/2025 0.7 0.1 - 1.0 mg/dL Final     Comment:     For infants and newborns, interpretation of results should be based   on gestational age, weight and in agreement with clinical   observations.    Premature Infant recommended reference ranges:   0-24 hours:  <8.0 mg/dL   24-48 hours: <12.0 mg/dL   3-5 days:    <15.0 mg/dL   6-29 days:   <15.0 mg/dL   04/23/2025 0.6 0.1 - 1.0 mg/dL Final     Comment:     For infants and newborns, interpretation of results should be based   on gestational age, weight and in agreement with clinical   observations.    Premature Infant recommended reference ranges:   0-24 hours:  <8.0 mg/dL   24-48 hours: <12.0 mg/dL   3-5 days:    <15.0 mg/dL   6-29 days:   <15.0 mg/dL   04/21/2025 0.6 0.1 - 1.0 mg/dL Final     Comment:     For infants and newborns, interpretation of results should be based   on gestational age, weight and in agreement with clinical   observations.    Premature Infant recommended reference ranges:   0-24 hours:  <8.0 mg/dL   24-48 hours: <12.0 mg/dL   3-5 days:    <15.0 mg/dL   6-29 days:   <15.0 mg/dL     AST   Date Value Ref Range Status   04/30/2025 31 11 - 45 unit/L Final   04/23/2025 37 11 - 45 unit/L Final   04/21/2025 38 11 - 45 unit/L Final   03/17/2025 31 10 - 40 U/L Final   03/10/2025 23 10 - 40 U/L Final   03/03/2025 26 10 - 40 U/L Final     ALT   Date Value Ref Range Status   04/30/2025 46 (H) 10 - 44 unit/L Final   04/23/2025 33 10 - 44 unit/L Final   04/21/2025 40 10 - 44 unit/L Final   03/17/2025 35 10 - 44 U/L Final   03/10/2025 31 10 - 44 U/L Final   03/03/2025 36 10 - 44 U/L Final             Sanjuana Poe, Pharm.D., BCOP  Clinical Pharmacy Specialist, Bone Marrow Transplant/Cellular Therapy  [facility]  SpectraLink: [id number]

## 2025-05-02 NOTE — PROGRESS NOTES
Section of Hematology and Stem Cell Transplantation    Post-Transplantation Follow Up Visit       Notes:    05/07/2025    Transplant History:   Primary oncologist: Paco Hickey MD  Primary oncologic diagnosis: MDS  Transplant date: 9/19/2024  Donor: haploidentical  Blood Type (Patient): B +  Blood Type (Donor): A +  CMV (Patient): Positive  CMV (Donor): Positive  Graft source: Bone marrow  CD34+ cell dose: 3.55x10^6  Conditioning Regimen: Fludarabine plus melphalan 100 mg/m2 + 2Gy TBI  GVHD prophylaxis: Post-transplant cyclophosphamide, Tacrolimus, MMF  Immediate post-transplant complications: Patient experienced expected GI toxicities with C diff negative diarrhea and nausea, neutropenic fever with negative infectious work up, expected cytopenias requiring transfusions, electrolyte abnormalities requiring replacement, volume overload requiring diuresis, intermittent SOB from pulmonary edema requiring 02, and a hemorrhoid flare up. Diarrhea and SOB improved towards the end of the hospital stay.     History of Present Ilness:   Guillaume Salinas (Guillaume) is a pleasant 69 y.o.male with a past medical history of MDS who is status post haploidentical stem cell transplantation conditioned with FluMel 100 + PTCy+ 2Gy TBI who is currently day +230  who presents for post-transplant follow up. His daughter translated today, they denied our  services.     Interval History:   Patient presents for routine follow-up post allogeneic transplant. He is doing very well. He saw urology yesterday and they started him on flomax. He has not had any nausea since starting budesonide. He is getting an EGD tomorrow.  Physical therapy has been going well. He is eating well.     PAST MEDICAL HISTORY:   Past Medical History:   Diagnosis Date    Anticoagulant long-term use     Coronary artery disease     Hypertension     Myelodysplastic syndrome     Peripheral vascular disease, unspecified        PAST SURGICAL HISTORY:    Past Surgical History:   Procedure Laterality Date    BONE MARROW BIOPSY Left 2023    Procedure: Biopsy-bone marrow;  Surgeon: Harry Diamond MD;  Location: [facility] OR;  Service: Oncology;  Laterality: Left;    COLONOSCOPY N/A 2022    Procedure: COLONOSCOPY Golytely Vaccinated will bring cards;  Surgeon: Dereje Simon MD;  Location: [facility] ENDO;  Service: Endoscopy;  Laterality: N/A;  Do not cancel this order    INSERTION OF LEMONS CATHETER Right 2024    Procedure: INSERTION, CATHETER, CENTRAL VENOUS, LEMONS TRIPLE LUMEN;  Surgeon: Kg Patten MD;  Location: [address];  Service: General;  Laterality: Right;     PAST SOCIAL HISTORY:  Social History     Socioeconomic History    Marital status:    Tobacco Use    Smoking status: Former     Current packs/day: 0.00     Average packs/day: 0.3 packs/day for 50.0 years (12.5 ttl pk-yrs)     Types: Cigarettes     Start date: 3/1/1973     Quit date: 3/1/2023     Years since quittin.1     Passive exposure: Past    Smokeless tobacco: Never   Substance and Sexual Activity    Alcohol use: Not Currently    Drug use: Never    Sexual activity: Not Currently     Partners: Female     Social Drivers of Health     Financial Resource Strain: Patient Declined (2024)    Overall Financial Resource Strain (CARDIA)     Difficulty of Paying Living Expenses: Patient declined   Food Insecurity: Patient Declined (2024)    Hunger Vital Sign     Worried About Running Out of Food in the Last Year: Patient declined     Ran Out of Food in the Last Year: Patient declined   Transportation Needs: Patient Declined (2024)    TRANSPORTATION NEEDS     Transportation : Patient declined   Physical Activity: Inactive (1/10/2025)    Exercise Vital Sign     Days of Exercise per Week: 0 days     Minutes of Exercise per Session: 10 min   Stress: Patient Declined (2024)    [place] [facility] [employer] - Occupational Stress  Questionnaire     Feeling of Stress : Patient declined   Recent Concern: Stress - Stress Concern Present (9/30/2024)    [place] [facility] [employer] - Occupational Stress Questionnaire     Feeling of Stress : To some extent   Housing Stability: Patient Declined (12/28/2024)    Housing Stability Vital Sign     Unable to Pay for Housing in the Last Year: Patient declined     Homeless in the Last Year: Patient declined       FAMILY HISTORY:  Cancer-related family history includes Cancer in his brother and father.    CURRENT MEDICATIONS:   Medication List with Changes/Refills   Current Medications    ACYCLOVIR (ZOVIRAX) 800 MG TAB    Take 1 tablet (800 mg total) by mouth 2 (two) times daily.    ATOVAQUONE (MEPRON) 750 MG/5 ML SUSP ORAL LIQUID    Take 10 mLs (1,500 mg total) by mouth once daily.    BUDESONIDE (ENTOCORT EC) 3 MG CAPSULE    Take 1 capsule (3 mg total) by mouth 3 (three) times daily.    CARVEDILOL (COREG) 3.125 MG TABLET    Take 1 tablet (3.125 mg total) by mouth 2 (two) times daily.    CYANOCOBALAMIN 1,000 MCG/ML INJECTION    Inject 1 mL (1,000 mcg total) into the muscle once a week.    ELTROMBOPAG OLAMINE (PROMACTA) 50 MG TAB    Take 1 tablet (50 mg total) by mouth once daily.    FOLIC ACID (FOLVITE) 1 MG TABLET    Take 1 tablet (1 mg total) by mouth once daily.    GABAPENTIN (NEURONTIN) 300 MG CAPSULE    Take 2 capsules (600 mg total) by mouth nightly as needed (Restless leg).    HYDROCORTISONE 1 % CREAM    Apply to affected area 2 times daily    LETERMOVIR (PREVYMIS) 480 MG TAB    Take 1 tablet (480 mg total) by mouth Daily.    LEVOFLOXACIN (LEVAQUIN) 500 MG TABLET    Take 1 tablet (500 mg total) by mouth once daily.    LOPERAMIDE (IMODIUM A-D) 2 MG TAB    Take 2 mg by mouth 4 (four) times daily as needed.    MAGNESIUM OXIDE (MAG-OX) 400 MG (241.3 MG MAGNESIUM) TABLET    Take 2 tablets (800 mg total) by mouth 2 (two) times daily.    ONDANSETRON (ZOFRAN-ODT) 8 MG TBDL    Take 1 tablet (8 mg  total) by mouth every 8 (eight) hours as needed (nausea).    PANTOPRAZOLE (PROTONIX) 40 MG TABLET    Take 1 tablet (40 mg total) by mouth once daily.    POSACONAZOLE (NOXAFIL) 100 MG TBEC TABLET    Take 3 tablets (300 mg total) by mouth once daily.    ROPINIROLE (REQUIP) 0.5 MG TABLET    Take 1 tablet (0.5 mg total) by mouth every evening.    TACROLIMUS (PROGRAF) 0.5 MG CAP    Take 1 capsule (0.5 mg total) by mouth every morning.    TAMSULOSIN (FLOMAX) 0.4 MG CAP    Take 1 capsule (0.4 mg total) by mouth once daily.    ZOLPIDEM (AMBIEN) 5 MG TAB    Take 1 tablet (5 mg total) by mouth nightly as needed (insomnia).       ALLERGIES:   Review of patient's allergies indicates:  No Known Allergies    GVHD Review of Systems:     Pertinent positives and negatives included in the HPI. Otherwise a 14 point review of systems is negative. GVHD review of systems recorded in BMT flowsheet.     Physical Exam:     Vitals:    05/07/25 1521   BP: 134/68   Pulse: 73   Temp: 97.9 °F (36.6 °C)             General: Appears well, NAD.   HEENT: MMM, no OP lesions  Pulmonary: CTAB, no increased work of breathing, no W/R/C  Cardiovascular: S1S2 normal, RRR, no M/R/G  Abdominal: Soft, NT, ND, BS+, no HSM  Extremities: No C/C/E  Neurological: AAOx4, grossly normal, no focal deficits  Dermatologic: No rashes or lesions     ECOG Performance Status: (foot note - ECOG PS provided by Eastern Cooperative Oncology Group) 1 - Symptomatic but completely ambulatory    Karnofsky Performance Score:  80%- Normal Activity with Effort: Some Symptoms of Disease    Labs:   Lab Results   Component Value Date    WBC 2.69 (L) 05/07/2025    HGB 9.7 (L) 05/07/2025    HCT 28.7 (L) 05/07/2025     (H) 05/07/2025    PLT 47 (L) 05/07/2025        CMP  Sodium   Date Value Ref Range Status   05/07/2025 137 136 - 145 mmol/L Final   03/17/2025 140 136 - 145 mmol/L Final     Potassium   Date Value Ref Range Status   05/07/2025 3.9 3.5 - 5.1 mmol/L Final   03/17/2025  4.9 3.5 - 5.1 mmol/L Final     Chloride   Date Value Ref Range Status   05/07/2025 104 95 - 110 mmol/L Final   03/17/2025 105 95 - 110 mmol/L Final     CO2   Date Value Ref Range Status   05/07/2025 25 23 - 29 mmol/L Final   03/17/2025 26 23 - 29 mmol/L Final     Glucose   Date Value Ref Range Status   05/07/2025 136 (H) 70 - 110 mg/dL Final   03/17/2025 109 70 - 110 mg/dL Final     BUN   Date Value Ref Range Status   05/07/2025 27 (H) 8 - 23 mg/dL Final     Creatinine   Date Value Ref Range Status   05/07/2025 0.9 0.5 - 1.4 mg/dL Final     Calcium   Date Value Ref Range Status   05/07/2025 8.7 8.7 - 10.5 mg/dL Final   03/17/2025 9.3 8.7 - 10.5 mg/dL Final     Protein Total   Date Value Ref Range Status   05/07/2025 4.9 (L) 6.0 - 8.4 gm/dL Final     Total Protein   Date Value Ref Range Status   03/17/2025 6.1 6.0 - 8.4 g/dL Final     Albumin   Date Value Ref Range Status   05/07/2025 3.1 (L) 3.5 - 5.2 g/dL Final   03/17/2025 3.5 3.5 - 5.2 g/dL Final     Total Bilirubin   Date Value Ref Range Status   03/17/2025 0.5 0.1 - 1.0 mg/dL Final     Comment:     For infants and newborns, interpretation of results should be based  on gestational age, weight and in agreement with clinical  observations.    Premature Infant recommended reference ranges:  Up to 24 hours.............<8.0 mg/dL  Up to 48 hours............<12.0 mg/dL  3-5 days..................<15.0 mg/dL  6-29 days.................<15.0 mg/dL       Bilirubin Total   Date Value Ref Range Status   05/07/2025 0.6 0.1 - 1.0 mg/dL Final     Comment:     For infants and newborns, interpretation of results should be based   on gestational age, weight and in agreement with clinical   observations.    Premature Infant recommended reference ranges:   0-24 hours:  <8.0 mg/dL   24-48 hours: <12.0 mg/dL   3-5 days:    <15.0 mg/dL   6-29 days:   <15.0 mg/dL     Alkaline Phosphatase   Date Value Ref Range Status   03/17/2025 66 40 - 150 U/L Final     ALP   Date Value Ref Range  Status   05/07/2025 83 40 - 150 unit/L Final     AST   Date Value Ref Range Status   05/07/2025 22 11 - 45 unit/L Final   03/17/2025 31 10 - 40 U/L Final     ALT   Date Value Ref Range Status   05/07/2025 34 10 - 44 unit/L Final   03/17/2025 35 10 - 44 U/L Final     Anion Gap   Date Value Ref Range Status   05/07/2025 8 8 - 16 mmol/L Final     eGFR   Date Value Ref Range Status   05/07/2025 >60 >60 mL/min/1.73/m2 Final     Comment:     Estimated GFR calculated using the CKD-EPI creatinine (2021) equation.   03/17/2025 >60 >60 mL/min/1.73 m^2 Final          Imaging:   Hospital imaging reviewed.    Pathology:  Prior pathology reviewed.     Acute GVHD Scoring:  GVHD Acute Assessment- No active GVHD. Budesonide taper given today.                Assessment and Plan:   Guillaume Salinas (Guillaume) is a pleasant 69 y.o.male with a past medical history of MDS s/p haplo SCT who presents for post-transplant follow up.    MDS: Status post treatment with azacitidine 75 mg/m2 daily x7 days plus venetoclax 100mg (voriconazole) daily x14 of 28 days.Venetoclax decreased to 7 days with cycle 3 until completion of 11 cycles. No plans for maintenance at this time.     Status post allogeneic stem cell transplantation: As noted above, status post haploidentical stem cell transplantation conditioned with FluMel 100 + PTCy+ 2Gy TBI . Currently Day+ 230. Engrafted on 10/09/24 day +20.  Day 30 bone marrow (11/5/2024) showing mildly hypercellular marrow with no increased blast (0.3%) and no evidence of myeloid neoplasm. Pending chimerisms, NGS, and CG  Day 100 bone marrow biopsy on 12/31/24 showed 30-40% cellularity, erythroid hyperplasia, dyserythropoiesis, decreased megakaryocytes with atypical morphology. No hemophagocytosis mentioned. NGS, FISH, and CG normal. 100% chimerisms.     3.    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 He started budesonide 3mg TID on 10/28/24. Due to persistent nausea despite budesonide so started systemic steroids 11/1 at 0.5 mg/kg and his steroid taper has been given to him. Steroid taper completed 12/8/24. No evidence of aGVHD today. 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 Taper completed. Patient's daughter sent messages through the portal about his nausea coming back with no improvement from zofran so budesonide 3mg TID was sent in on 2/6/25. Budesonide taper ended 3/13/25. Can consider tapering off tacrolimus in next few weeks if GVHD does not return. Skin gvhd present on face with erythema, inflammation, and sand paper feel on 3/14/25. Rash now improved on 4/7/25 with now only have some erythema likely from skin irration from hydrocortisone cream- cream stopped today. 4/23/25 Patient reports persistent nausea and appetite suppression; Budesonide 3mg TID started again; he has an EGD on 5/8/25. Can start tapering budesonide 5/9/25. Taper listed below  A. Current tacro dose: 0.5 mg qAM  B. Last tacro level: 8.2  C. Adjustments: N/a    5/9-5/15: Two pills daily   5/16- 5/22: One pill daily   5/23:-5/29: Take one pill every other day  5/30: stop    4.     Immunosuppression: Prevention with posaconazole, acyclovir. CMV prophylaxis with letermovir. PJP prophyalxis atovaquone. Continue weekly monitoring of CMV and EBV.  Last CMV: Not-detected,   last EBV: negative  Active infections: N/A    Pancytopenia: Due to underlying disease and chemotherapy. Transfuse for Hgb <7 g/dL and platelets <10k. Folate and copper deficient, on supplementation. Will continue to monitor to see if platelets begin to rise with his supplements. Promacta sent in on 12/9/24. Change TMP/SMX to atovaquone. Continue promacta and supplements listed below (if platelets not budging or stay lower can  increase to 100mg from 50mg)  Folic Acid deficiency: Continue folic acid 1mg daily  Copper deficiency: Copper level of 635, continue copper gluconate 2mg daily   B12 deficiency: Patient will get his next b12 injection monthly. He received one today in clinic. Next one around 6/7/25    Post transplant lymphoproliferative disorder: Patient received rituxan on 12/30/24 due to possible PTLD with elevated EBV and possible HLH. Now EBV has improved.   EBV negative     Hypomagnesemia: Continue MagOx to 800mg twice daily.      Chemotherapy Induced Nausea: Resolved with steroids and the taper originally ended 12/8/24. He ended up having nausea again while hospitalized and was again started on steroid and taper ended 1/28/25. Patient's daughter sent messages through the portal about his nausea coming back with no improvement from zofran so budesonide 3mg TID was sent in on 2/6/25. Budesonide taper ended 3/13/25.  Message sent through portal on 4/17/25 by his daughter about him having nausea again. Advised EGD to determine if it is GVHD due to nausea being intermittent many times. EGD scheduled for 5/8/25.     Anxiety, Restless Leg Syndrome: Noted since discharge by family. He started ropinirole 0.5mg and has experienced significant improvement.  Symptoms have resolved. Continue Ropinirole    Insomnia: Patient now sleeping well and only waking up to urinate at night. Continue Ambien and Ropinirole for RLS.      Blood pressure abnormalities: For awhile patient had been holding off of his carvedilol due to hypotensions and dizziness. His pressures at home have been averaging 170s/90s so he started the carvedilol 6.25mg BID again. He began to complain of orthostatic hypotension symptoms when he takes the medication and BP in clinic is 105/55. His carvedilol was lowered to 3.125 daily. Today his blood pressure is normotensive. For now continue carvedilol 3.125mg BID and will monitor and make adjustments as needed.    12. Benign  prostate hyperplasia : Patient has been experiencing urinary frequency for many weeks now but denies dysuria and hematuria. Urinalysis  was negative.  He saw urology 5/5/25 and was started on flomax for BPH    Orders Placed:             Follow Up: Every other week with weekly labs       BMT Chart Routing      Follow up with physician 2 weeks. With roseline if available. if not then with me   Follow up with [facility]    Provider visit type    Infusion scheduling note    Injection scheduling note    Labs CBC, CMP, magnesium, phosphorus, type and screen, EBV and CMV   Scheduling:  Preferred lab:  Lab interval:  weekly   Imaging    Pharmacy appointment    Other referrals                     A total of 40 minutes was spent in pre-visit chart review, personal interpretation of labs and imaging, and medication review. Total visit time 35 minutes, >50 % counseling.       Gilma Ugalde PA-C  Malignant Hematology, Stem Cell Transplant, and Cellular Therapy  [facility]  [facility]

## 2025-05-05 ENCOUNTER — OFFICE VISIT (OUTPATIENT)
Dept: UROLOGY | Facility: CLINIC | Age: 70
End: 2025-05-05
Payer: MEDICARE

## 2025-05-05 VITALS
WEIGHT: 143.44 LBS | DIASTOLIC BLOOD PRESSURE: 85 MMHG | HEIGHT: 69 IN | HEART RATE: 81 BPM | SYSTOLIC BLOOD PRESSURE: 135 MMHG | BODY MASS INDEX: 21.24 KG/M2

## 2025-05-05 DIAGNOSIS — N40.1 BPH WITH OBSTRUCTION/LOWER URINARY TRACT SYMPTOMS: Primary | ICD-10-CM

## 2025-05-05 DIAGNOSIS — R35.0 URINARY FREQUENCY: ICD-10-CM

## 2025-05-05 DIAGNOSIS — N13.8 BPH WITH OBSTRUCTION/LOWER URINARY TRACT SYMPTOMS: Primary | ICD-10-CM

## 2025-05-05 PROCEDURE — 1159F MED LIST DOCD IN RCRD: CPT | Mod: CPTII,S$GLB,, | Performed by: UROLOGY

## 2025-05-05 PROCEDURE — 99214 OFFICE O/P EST MOD 30 MIN: CPT | Mod: S$GLB,,, | Performed by: UROLOGY

## 2025-05-05 PROCEDURE — 3008F BODY MASS INDEX DOCD: CPT | Mod: CPTII,S$GLB,, | Performed by: UROLOGY

## 2025-05-05 PROCEDURE — 99999 PR PBB SHADOW E&M-EST. PATIENT-LVL IV: CPT | Mod: PBBFAC,,, | Performed by: UROLOGY

## 2025-05-05 PROCEDURE — 3079F DIAST BP 80-89 MM HG: CPT | Mod: CPTII,S$GLB,, | Performed by: UROLOGY

## 2025-05-05 PROCEDURE — 3288F FALL RISK ASSESSMENT DOCD: CPT | Mod: CPTII,S$GLB,, | Performed by: UROLOGY

## 2025-05-05 PROCEDURE — 81003 URINALYSIS AUTO W/O SCOPE: CPT | Performed by: UROLOGY

## 2025-05-05 PROCEDURE — 1101F PT FALLS ASSESS-DOCD LE1/YR: CPT | Mod: CPTII,S$GLB,, | Performed by: UROLOGY

## 2025-05-05 PROCEDURE — 1160F RVW MEDS BY RX/DR IN RCRD: CPT | Mod: CPTII,S$GLB,, | Performed by: UROLOGY

## 2025-05-05 PROCEDURE — 1126F AMNT PAIN NOTED NONE PRSNT: CPT | Mod: CPTII,S$GLB,, | Performed by: UROLOGY

## 2025-05-05 PROCEDURE — 3075F SYST BP GE 130 - 139MM HG: CPT | Mod: CPTII,S$GLB,, | Performed by: UROLOGY

## 2025-05-05 RX ORDER — TAMSULOSIN HYDROCHLORIDE 0.4 MG/1
0.4 CAPSULE ORAL DAILY
Qty: 30 CAPSULE | Refills: 11 | Status: SHIPPED | OUTPATIENT
Start: 2025-05-05 | End: 2026-05-05

## 2025-05-05 NOTE — PROGRESS NOTES
"  Outpatient Rehab    Physical Therapy Visit    Patient Name: Guillaume Salinas  MRN: [id number]  YOB: 1955  Encounter Date: 4/29/2025    Therapy Diagnosis:   Encounter Diagnosis   Name Primary?    Decreased strength, endurance, and mobility Yes     Physician: Mohit Hickey MD    Physician Orders: Eval and Treat  Medical Diagnosis: S/P allogeneic bone marrow transplant    Visit # / Visits Authorized:  13 / 20  Insurance Authorization Period: 1/1/2025 to 12/31/2025  Date of Evaluation: 12/20/25  Plan of Care Certification: 12/09/2025      PT/PTA: PTA   Number of PTA visits since last PT visit:2  Time In: 1500   Time Out: 1601  Total Time (in minutes): 61   Total Billable Time (in minutes): 38    FOTO:  Intake Score:  %  Survey Score 2:  %  Survey Score 3:  %         Subjective   agreeable to PT session. "I get tired but i'm feeling better"..         Objective            Treatment:  Balance/Neuromuscular Re-Education  NMR 1: LAQ 3x10 RTB  NMR 2: standing heel raises 2x10 B  NMR 3: standing hip abduction 2x10 B  NMR 4: LEg press 4 pl 2x10 B  Therapeutic Activity  TA 1: SLR 2x10 B  TA 2: SAQ 2# 3x10 B  TA 3: Hip adduction 5"x20 B  TA 4: Nustep x 8 min level 2.5  TA 5: Sidelying Clamshells 5"x15 B    Time Entry(in minutes):  Neuromuscular Re-Education Time Entry: 10  Therapeutic Activity Time Entry: 28    Assessment & Plan   Assessment: Pt completed today's session focusing on general LE strengthening as per logged in treatment. Pt did report minor complaints of nausea today, but improved since his pervious PT session. Completed session with no adverse events, appeared motivatd throughout treatment  Evaluation/Treatment Tolerance: Patient tolerated treatment well    Patient will continue to benefit from skilled outpatient physical therapy to address the deficits listed in the problem list box on initial evaluation, provide pt/family education and to maximize pt's level of independence in the " home and community environment.     Patient's spiritual, cultural, and educational needs considered and patient agreeable to plan of care and goals.           Plan: cont to progress PT as appropriate.      Goals: Goals: Short Term Goals: 6 weeks   Patient will be independent with Home exercise program to supplement therapy progressing, not met   Patient will be able to ambulate 1300 ft with 6 Minute walk test to improve endurance for community mobility  met  Patient will be able to lift and carry groceries without difficulty to improve ability to perform functional tasks  met     Long Term Goals: 12 weeks   Patient will be able to ambulate 1400 ft or more with 6 Minute walk test to improve endurance for community mobility progressing, not met  Patient will be able to perform 16 sit to stand on 30 second sit to  order to improve functional strength and endurance for transfers progressing, not met  Patient will improve FOTO score to 65 % to improve self perceived ability to perform functional tasks progressing, not met  Patient goal: Patient will be able to mow his lawn to return to prior level of function progressing, not met    Julian Morrow, PTA

## 2025-05-05 NOTE — PROGRESS NOTES
[facility] - Urology   Clinic Note    SUBJECTIVE:     Chief Complaint   Patient presents with    [facility]    Urinary Frequency       Referral from: Gilma Ugalde PA-C.    History of Present Illness:  Guillaume Salinas is a 69 y.o. male who presents to clinic for lower urinary tract symptoms and BPH.    Reports increased frequency 10-15x per day. Nocturia x5 per night. Currently undergoing active treatment for leukemia    Previous medications for BPH include: no  AUA Symptom Score: 23/6  Previous prostatic surgery: No    CT shows prostate 43cc, significant bladder hypertrophy  PSA 2.6    Patient endorses no additional complaints at this time.    Past Medical History:   Diagnosis Date    Anticoagulant long-term use     Coronary artery disease     Hypertension     Myelodysplastic syndrome     Peripheral vascular disease, unspecified        Past Surgical History:   Procedure Laterality Date    BONE MARROW BIOPSY Left 4/26/2023    Procedure: Biopsy-bone marrow;  Surgeon: Harry Diamond MD;  Location: [facility] OR;  Service: Oncology;  Laterality: Left;    COLONOSCOPY N/A 01/05/2022    Procedure: COLONOSCOPY Golytely Vaccinated will bring cards;  Surgeon: Dereje Simon MD;  Location: [facility];  Service: Endoscopy;  Laterality: N/A;  Do not cancel this order    INSERTION OF LEMONS CATHETER Right 9/13/2024    Procedure: INSERTION, CATHETER, CENTRAL VENOUS, LEMONS TRIPLE LUMEN;  Surgeon: Kg Patten MD;  Location: [address];  Service: General;  Laterality: Right;       Family History   Problem Relation Name Age of Onset    Hypertension Mother      Cancer Father      Cancer Brother 9     No Known Problems Daughter      No Known Problems Daughter      No Known Problems Son         Social History[1]    Medications Ordered Prior to Encounter[2]    Review of patient's allergies indicates:  No Known Allergies    Review of Systems:  A review of 10+ systems was conducted with pertinent  "positive and negative findings documented in HPI with all other systems reviewed and negative.    OBJECTIVE:     Estimated body mass index is 21.18 kg/m² as calculated from the following:    Height as of this encounter: 5' 9" (1.753 m).    Weight as of this encounter: 65.1 kg (143 lb 6.6 oz).    Vital Signs (Most Recent)  Vitals:    05/05/25 1437   BP: 135/85   Pulse: 81       Physical Exam:  GENERAL: patient sitting comfortably  HEENT: normocephalic  NECK: supple, no JVD  PULM: normal chest rise, no increased WOB  HEART: non-diaphoretic  ABDO: soft, nondistended, nontender  BACK: no CVA tenderness bilaterally  SKIN: warm, dry, well perfused  EXT: no bruising or edema  NEURO: grossly normal with no focal deficits  PSYCH: appropriate mood and affect    Genitourinary Exam:  deferred    Lab Results   Component Value Date    BUN 24 (H) 04/30/2025    CREATININE 1.1 04/30/2025    WBC 3.30 (L) 04/30/2025    HGB 9.9 (L) 04/30/2025    HCT 28.9 (L) 04/30/2025    PLT 62 (L) 04/30/2025    AST 31 04/30/2025    ALT 46 (H) 04/30/2025    ALKPHOS 82 04/30/2025    ALBUMIN 3.4 (L) 04/30/2025    HGBA1C 5.2 10/19/2021    INR 1.2 12/29/2024        Imaging:  I have personally reviewed all relevant imaging studies.    No results found. However, due to the size of the patient record, not all encounters were searched. Please check Results Review for a complete set of results.  No results found. However, due to the size of the patient record, not all encounters were searched. Please check Results Review for a complete set of results.  US Abdomen Limited  Narrative: EXAMINATION:  US ABDOMEN LIMITED    CLINICAL HISTORY:  RUQ Ultrasound to assess gall bladder. Elevated LFTS;    TECHNIQUE:  Limited ultrasound of the right upper quadrant of the abdomen (including pancreas, liver, gallbladder, common bile duct, and spleen) was performed.    COMPARISON:  CT chest abdomen pelvis 12/27/2024.    FINDINGS:  Pancreas: The visualized portions of pancreas " appear normal.    Liver: Enlarged measuring 19.2 cm. Diffusely increased echogenicity of the liver parenchyma suggestive of steatosis.  No focal hepatic lesions.    Gallbladder: Gallbladder sludge.  No gallstones identified.  Mild gallbladder wall thickening, nonspecific, without hyperemia.  Technologist reports negative sonographic Luis's sign.    Biliary system: The common duct is not dilated, measuring 5 mm.  No intrahepatic ductal dilatation.    Spleen: Enlarged measuring 12.8 x 5.0 cm with normal echotexture.    IVC: Normal.    Miscellaneous: No upper abdominal ascites.  Impression: 1. Gallbladder sludge and nonspecific gallbladder wall thickening.  No findings to suggest acute cholecystitis.  2. Hepatomegaly with diffuse steatosis.  3. Splenomegaly.    Electronically signed by resident: Vignesh Amos  Date:    2024  Time:    10:58    Electronically signed by: Landen Presley MD  Date:    2024  Time:    11:39       PSA:  Lab Results   Component Value Date    PSA 2.6 2024    PSA 4.1 (H) 2024    PSA 1.4 2023    PSA 2.5 10/19/2021       Testosterone:  Lab Results   Component Value Date    TOTALTESTOST 964 2025        ASSESSMENT     1. BPH with obstruction/lower urinary tract symptoms    2. Urinary frequency        PLAN:     Start Flomax. Return to clinic in 1 month  Patient interested in surgery but would like to attempt medications prior to surgery  UA today.     Srikanth Ledbetter MD  Urology  [facility] [place]    Disclaimer: This note has been generated using voice-recognition software. There may be typographical errors that have been missed during proof-reading.          [1]   Social History  Tobacco Use    Smoking status: Former     Current packs/day: 0.00     Average packs/day: 0.3 packs/day for 50.0 years (12.5 ttl pk-yrs)     Types: Cigarettes     Start date: 3/1/1973     Quit date: 3/1/2023     Years since quittin.1     Passive exposure: Past     Smokeless tobacco: Never   Substance Use Topics    Alcohol use: Not Currently    Drug use: Never   [2]   Current Outpatient Medications on File Prior to Visit   Medication Sig Dispense Refill    acyclovir (ZOVIRAX) 800 MG Tab Take 1 tablet (800 mg total) by mouth 2 (two) times daily. 60 tablet 11    atovaquone (MEPRON) 750 mg/5 mL Susp oral liquid Take 10 mLs (1,500 mg total) by mouth once daily. 210 mL 2    budesonide (ENTOCORT EC) 3 mg capsule Take 1 capsule (3 mg total) by mouth 3 (three) times daily. 90 capsule 2    carvediloL (COREG) 3.125 MG tablet Take 1 tablet (3.125 mg total) by mouth 2 (two) times daily. 60 tablet 11    cyanocobalamin 1,000 mcg/mL injection Inject 1 mL (1,000 mcg total) into the muscle once a week. 10 mL 2    eltrombopag olamine (PROMACTA) 50 MG Tab Take 1 tablet (50 mg total) by mouth once daily. 30 tablet 11    folic acid (FOLVITE) 1 MG tablet Take 1 tablet (1 mg total) by mouth once daily. 100 tablet 3    gabapentin (NEURONTIN) 300 MG capsule Take 2 capsules (600 mg total) by mouth nightly as needed (Restless leg). 60 capsule 2    hydrocortisone 1 % cream Apply to affected area 2 times daily 30 g 1    letermovir (PREVYMIS) 480 mg Tab Take 1 tablet (480 mg total) by mouth Daily. 28 tablet 9    levoFLOXacin (LEVAQUIN) 500 MG tablet Take 1 tablet (500 mg total) by mouth once daily. 30 tablet 5    loperamide (IMODIUM A-D) 2 mg Tab Take 2 mg by mouth 4 (four) times daily as needed.      magnesium oxide (MAG-OX) 400 mg (241.3 mg magnesium) tablet Take 2 tablets (800 mg total) by mouth 2 (two) times daily. 120 tablet 11    ondansetron (ZOFRAN-ODT) 8 MG TbDL Take 1 tablet (8 mg total) by mouth every 8 (eight) hours as needed (nausea). 30 tablet 1    pantoprazole (PROTONIX) 40 MG tablet Take 1 tablet (40 mg total) by mouth once daily. 30 tablet 3    posaconazole (NOXAFIL) 100 mg TbEC tablet Take 3 tablets (300 mg total) by mouth once daily. 90 tablet 11    rOPINIRole (REQUIP) 0.5 MG tablet Take  1 tablet (0.5 mg total) by mouth every evening. 30 tablet 11    tacrolimus (PROGRAF) 0.5 MG Cap Take 1 capsule (0.5 mg total) by mouth every morning. 90 capsule 3    zolpidem (AMBIEN) 5 MG Tab Take 1 tablet (5 mg total) by mouth nightly as needed (insomnia). 30 tablet 0     No current facility-administered medications on file prior to visit.

## 2025-05-06 ENCOUNTER — TELEPHONE (OUTPATIENT)
Dept: ENDOSCOPY | Facility: HOSPITAL | Age: 70
End: 2025-05-06
Payer: MEDICARE

## 2025-05-06 ENCOUNTER — CLINICAL SUPPORT (OUTPATIENT)
Dept: REHABILITATION | Facility: HOSPITAL | Age: 70
End: 2025-05-06
Payer: MEDICARE

## 2025-05-06 DIAGNOSIS — R68.89 DECREASED STRENGTH, ENDURANCE, AND MOBILITY: Primary | ICD-10-CM

## 2025-05-06 DIAGNOSIS — R53.1 DECREASED STRENGTH, ENDURANCE, AND MOBILITY: Primary | ICD-10-CM

## 2025-05-06 DIAGNOSIS — Z74.09 DECREASED STRENGTH, ENDURANCE, AND MOBILITY: Primary | ICD-10-CM

## 2025-05-06 LAB
BILIRUB UR QL STRIP.AUTO: NEGATIVE
CLARITY UR: CLEAR
COLOR UR AUTO: YELLOW
GLUCOSE UR QL STRIP: NEGATIVE
HGB UR QL STRIP: ABNORMAL
KETONES UR QL STRIP: NEGATIVE
LEUKOCYTE ESTERASE UR QL STRIP: NEGATIVE
NITRITE UR QL STRIP: NEGATIVE
PH UR STRIP: 6 [PH]
PROT UR QL STRIP: ABNORMAL
SP GR UR STRIP: 1.02
UROBILINOGEN UR STRIP-ACNC: NEGATIVE EU/DL

## 2025-05-06 PROCEDURE — 97112 NEUROMUSCULAR REEDUCATION: CPT | Mod: PN,CQ

## 2025-05-06 PROCEDURE — 97530 THERAPEUTIC ACTIVITIES: CPT | Mod: PN,CQ

## 2025-05-06 NOTE — PROGRESS NOTES
"  Outpatient Rehab    Physical Therapy Visit    Patient Name: Guillaume Salinas  MRN: [id number]  YOB: 1955  Encounter Date: 5/6/2025    Therapy Diagnosis:   Encounter Diagnosis   Name Primary?    Decreased strength, endurance, and mobility Yes     Physician: Mohit Hickey MD    Physician Orders: Eval and Treat  Medical Diagnosis: S/P allogeneic bone marrow transplant    Visit # / Visits Authorized:  14 / 20  Insurance Authorization Period: 1/1/2025 to 12/31/2025  Date of Evaluation: 12/20/24  Plan of Care Certification: 12/09/2025      PT/PTA:     Number of PTA visits since last PT visit:   Time In: 1500   Time Out: 1555  Total Time (in minutes): 55   Total Billable Time (in minutes): 55    FOTO:  Intake Score:  %  Survey Score 2:  %  Survey Score 3:  %         Subjective   agreeable to PT session. "I feel like i'm getting better"..         Objective            Treatment:  Balance/Neuromuscular Re-Education  NMR 2: standing heel raises 3x10 B  NMR 3: standing hip abduction 2x10 B RTB  NMR 4: Cybex Leg press 4 pl 3x10 B  NMR 5: Cybex knee [id number] 3x10 B  NMR 6: Standing hip extension RTB 2x10  Therapeutic Activity  TA 4: Nustep x 8 min level 2.5 SPRINTS  TA 5: Pulldown GTB 3x10 B  TA 6: Rows 3x10 GTB B  TA 7: Horizontal Abduction YTB 3x10 B  TA 8: Step ups FWD x 10 B 6" in //  TA 9: Step up Lateral x10 B  6" in //    Time Entry(in minutes):  Neuromuscular Re-Education Time Entry: 25  Therapeutic Activity Time Entry: 30    Assessment & Plan   Assessment: Pt arrived to session in good spirits, no reports of pain / fatigue. Pt able to participate in treatment with focus on endurance / standing activities as per logged in treatment log. Seated rest breaks granted as needed for general fatigue recovery (within acceptable parameters). No adverse response to additional activities per pt.  Evaluation/Treatment Tolerance: Patient tolerated treatment well    Patient will continue to benefit " from skilled outpatient physical therapy to address the deficits listed in the problem list box on initial evaluation, provide pt/family education and to maximize pt's level of independence in the home and community environment.     Patient's spiritual, cultural, and educational needs considered and patient agreeable to plan of care and goals.           Plan: cont to progress PT as appropriate. Monitor response to session.    Goals:     Julian Morrow, PTA

## 2025-05-06 NOTE — TELEPHONE ENCOUNTER
Called patient and he got his wife on the phone, her name was Adriana Rush. Discussed NPO status and necessary meds morning of procedure with a small sip of water. Also discussed holding other meds until after procedure. She confirmed understanding and translated to patient. No new questions at this time.

## 2025-05-07 ENCOUNTER — LAB VISIT (OUTPATIENT)
Dept: LAB | Facility: HOSPITAL | Age: 70
End: 2025-05-07
Attending: INTERNAL MEDICINE
Payer: MEDICARE

## 2025-05-07 ENCOUNTER — CLINICAL SUPPORT (OUTPATIENT)
Dept: HEMATOLOGY/ONCOLOGY | Facility: CLINIC | Age: 70
End: 2025-05-07
Payer: MEDICARE

## 2025-05-07 ENCOUNTER — OFFICE VISIT (OUTPATIENT)
Dept: HEMATOLOGY/ONCOLOGY | Facility: CLINIC | Age: 70
End: 2025-05-07
Payer: MEDICARE

## 2025-05-07 VITALS
SYSTOLIC BLOOD PRESSURE: 134 MMHG | OXYGEN SATURATION: 97 % | TEMPERATURE: 98 F | HEIGHT: 69 IN | DIASTOLIC BLOOD PRESSURE: 68 MMHG | WEIGHT: 143.31 LBS | HEART RATE: 73 BPM | BODY MASS INDEX: 21.22 KG/M2

## 2025-05-07 DIAGNOSIS — N40.0 BENIGN PROSTATIC HYPERPLASIA, UNSPECIFIED WHETHER LOWER URINARY TRACT SYMPTOMS PRESENT: ICD-10-CM

## 2025-05-07 DIAGNOSIS — D89.813 GVHD (GRAFT VERSUS HOST DISEASE): ICD-10-CM

## 2025-05-07 DIAGNOSIS — F41.9 ANXIETY: ICD-10-CM

## 2025-05-07 DIAGNOSIS — I10 PRIMARY HYPERTENSION: ICD-10-CM

## 2025-05-07 DIAGNOSIS — Z94.81 S/P ALLOGENEIC BONE MARROW TRANSPLANT: ICD-10-CM

## 2025-05-07 DIAGNOSIS — E53.8 FOLIC ACID DEFICIENCY: ICD-10-CM

## 2025-05-07 DIAGNOSIS — E61.0 COPPER DEFICIENCY: ICD-10-CM

## 2025-05-07 DIAGNOSIS — D84.822 IMMUNOCOMPROMISED STATE ASSOCIATED WITH STEM CELL TRANSPLANT: ICD-10-CM

## 2025-05-07 DIAGNOSIS — Z94.84 IMMUNOCOMPROMISED STATE ASSOCIATED WITH STEM CELL TRANSPLANT: ICD-10-CM

## 2025-05-07 DIAGNOSIS — E53.8 B12 DEFICIENCY: Primary | ICD-10-CM

## 2025-05-07 DIAGNOSIS — E53.8 B12 DEFICIENCY: ICD-10-CM

## 2025-05-07 DIAGNOSIS — D46.9 MYELODYSPLASTIC SYNDROME: Primary | ICD-10-CM

## 2025-05-07 DIAGNOSIS — Z76.82 STEM CELL TRANSPLANT CANDIDATE: ICD-10-CM

## 2025-05-07 DIAGNOSIS — D46.9 MYELODYSPLASTIC SYNDROME: ICD-10-CM

## 2025-05-07 DIAGNOSIS — G25.81 RESTLESS LEG SYNDROME: ICD-10-CM

## 2025-05-07 DIAGNOSIS — D61.818 PANCYTOPENIA: ICD-10-CM

## 2025-05-07 LAB
ABSOLUTE EOSINOPHIL (OHS): 0.01 K/UL
ABSOLUTE MONOCYTE (OHS): 0.41 K/UL (ref 0.3–1)
ABSOLUTE NEUTROPHIL COUNT (OHS): 1.85 K/UL (ref 1.8–7.7)
ALBUMIN SERPL BCP-MCNC: 3.1 G/DL (ref 3.5–5.2)
ALP SERPL-CCNC: 83 UNIT/L (ref 40–150)
ALT SERPL W/O P-5'-P-CCNC: 34 UNIT/L (ref 10–44)
ANION GAP (OHS): 8 MMOL/L (ref 8–16)
AST SERPL-CCNC: 22 UNIT/L (ref 11–45)
BASOPHILS # BLD AUTO: 0 K/UL
BASOPHILS NFR BLD AUTO: 0 %
BILIRUB SERPL-MCNC: 0.6 MG/DL (ref 0.1–1)
BUN SERPL-MCNC: 27 MG/DL (ref 8–23)
CALCIUM SERPL-MCNC: 8.7 MG/DL (ref 8.7–10.5)
CHLORIDE SERPL-SCNC: 104 MMOL/L (ref 95–110)
CO2 SERPL-SCNC: 25 MMOL/L (ref 23–29)
CREAT SERPL-MCNC: 0.9 MG/DL (ref 0.5–1.4)
ERYTHROCYTE [DISTWIDTH] IN BLOOD BY AUTOMATED COUNT: 15.7 % (ref 11.5–14.5)
GFR SERPLBLD CREATININE-BSD FMLA CKD-EPI: >60 ML/MIN/1.73/M2
GLUCOSE SERPL-MCNC: 136 MG/DL (ref 70–110)
HCT VFR BLD AUTO: 28.7 % (ref 40–54)
HGB BLD-MCNC: 9.7 GM/DL (ref 14–18)
IMM GRANULOCYTES # BLD AUTO: 0.12 K/UL (ref 0–0.04)
IMM GRANULOCYTES NFR BLD AUTO: 4.5 % (ref 0–0.5)
INDIRECT COOMBS: NORMAL
INR PPP: 1 (ref 0.8–1.2)
LDH SERPL-CCNC: 267 U/L (ref 110–260)
LYMPHOCYTES # BLD AUTO: 0.3 K/UL (ref 1–4.8)
MAGNESIUM SERPL-MCNC: 1.7 MG/DL (ref 1.6–2.6)
MCH RBC QN AUTO: 38.5 PG (ref 27–31)
MCHC RBC AUTO-ENTMCNC: 33.8 G/DL (ref 32–36)
MCV RBC AUTO: 114 FL (ref 82–98)
NUCLEATED RBC (/100WBC) (OHS): 0 /100 WBC
PHOSPHATE SERPL-MCNC: 3.5 MG/DL (ref 2.7–4.5)
PLATELET # BLD AUTO: 47 K/UL (ref 150–450)
PMV BLD AUTO: 11.1 FL (ref 9.2–12.9)
POTASSIUM SERPL-SCNC: 3.9 MMOL/L (ref 3.5–5.1)
PROT SERPL-MCNC: 4.9 GM/DL (ref 6–8.4)
PROTHROMBIN TIME: 11.4 SECONDS (ref 9–12.5)
RBC # BLD AUTO: 2.52 M/UL (ref 4.6–6.2)
RELATIVE EOSINOPHIL (OHS): 0.4 %
RELATIVE LYMPHOCYTE (OHS): 11.2 % (ref 18–48)
RELATIVE MONOCYTE (OHS): 15.2 % (ref 4–15)
RELATIVE NEUTROPHIL (OHS): 68.7 % (ref 38–73)
RH BLD: NORMAL
SODIUM SERPL-SCNC: 137 MMOL/L (ref 136–145)
SPECIMEN OUTDATE: NORMAL
URATE SERPL-MCNC: 3.6 MG/DL (ref 3.4–7)
WBC # BLD AUTO: 2.69 K/UL (ref 3.9–12.7)

## 2025-05-07 PROCEDURE — 83735 ASSAY OF MAGNESIUM: CPT

## 2025-05-07 PROCEDURE — 86901 BLOOD TYPING SEROLOGIC RH(D): CPT | Performed by: INTERNAL MEDICINE

## 2025-05-07 PROCEDURE — 83615 LACTATE (LD) (LDH) ENZYME: CPT

## 2025-05-07 PROCEDURE — 84460 ALANINE AMINO (ALT) (SGPT): CPT

## 2025-05-07 PROCEDURE — 84550 ASSAY OF BLOOD/URIC ACID: CPT

## 2025-05-07 PROCEDURE — 80197 ASSAY OF TACROLIMUS: CPT

## 2025-05-07 PROCEDURE — 99999 PR PBB SHADOW E&M-EST. PATIENT-LVL IV: CPT | Mod: PBBFAC,,,

## 2025-05-07 PROCEDURE — 84100 ASSAY OF PHOSPHORUS: CPT

## 2025-05-07 PROCEDURE — 85025 COMPLETE CBC W/AUTO DIFF WBC: CPT

## 2025-05-07 PROCEDURE — 87799 DETECT AGENT NOS DNA QUANT: CPT

## 2025-05-07 PROCEDURE — 36415 COLL VENOUS BLD VENIPUNCTURE: CPT

## 2025-05-07 PROCEDURE — 85610 PROTHROMBIN TIME: CPT

## 2025-05-07 RX ORDER — CYANOCOBALAMIN 1000 UG/ML
1000 INJECTION, SOLUTION INTRAMUSCULAR; SUBCUTANEOUS
Status: COMPLETED | OUTPATIENT
Start: 2025-05-07 | End: 2025-05-07

## 2025-05-07 RX ADMIN — CYANOCOBALAMIN 1000 MCG: 1000 INJECTION, SOLUTION INTRAMUSCULAR; SUBCUTANEOUS at 03:05

## 2025-05-08 ENCOUNTER — HOSPITAL ENCOUNTER (OUTPATIENT)
Facility: HOSPITAL | Age: 70
Discharge: HOME | End: 2025-05-08
Attending: STUDENT IN AN ORGANIZED HEALTH CARE EDUCATION/TRAINING PROGRAM | Admitting: STUDENT IN AN ORGANIZED HEALTH CARE EDUCATION/TRAINING PROGRAM
Payer: MEDICARE

## 2025-05-08 ENCOUNTER — ANESTHESIA EVENT (OUTPATIENT)
Dept: ENDOSCOPY | Facility: HOSPITAL | Age: 70
End: 2025-05-08
Payer: MEDICARE

## 2025-05-08 ENCOUNTER — PATIENT MESSAGE (OUTPATIENT)
Dept: HEMATOLOGY/ONCOLOGY | Facility: CLINIC | Age: 70
End: 2025-05-08
Payer: MEDICARE

## 2025-05-08 ENCOUNTER — ANESTHESIA (OUTPATIENT)
Dept: ENDOSCOPY | Facility: HOSPITAL | Age: 70
End: 2025-05-08
Payer: MEDICARE

## 2025-05-08 ENCOUNTER — RESULTS FOLLOW-UP (OUTPATIENT)
Dept: INTERNAL MEDICINE | Facility: CLINIC | Age: 70
End: 2025-05-08

## 2025-05-08 VITALS
HEART RATE: 73 BPM | WEIGHT: 143 LBS | HEIGHT: 68 IN | BODY MASS INDEX: 21.67 KG/M2 | RESPIRATION RATE: 17 BRPM | TEMPERATURE: 98 F | OXYGEN SATURATION: 100 % | SYSTOLIC BLOOD PRESSURE: 145 MMHG | DIASTOLIC BLOOD PRESSURE: 72 MMHG

## 2025-05-08 DIAGNOSIS — R11.0 NAUSEA: ICD-10-CM

## 2025-05-08 DIAGNOSIS — D46.9 MYELODYSPLASTIC SYNDROME: ICD-10-CM

## 2025-05-08 DIAGNOSIS — D89.813 GVHD (GRAFT VERSUS HOST DISEASE): ICD-10-CM

## 2025-05-08 DIAGNOSIS — R11.2 NAUSEA AND VOMITING, UNSPECIFIED VOMITING TYPE: Primary | ICD-10-CM

## 2025-05-08 LAB
CYTOMEGALOVIRUS DNA, QUAL (OHS): NOT DETECTED
EPSTEIN-BARR VIRUS DNA, QUAL (OHS): NORMAL
TACROLIMUS BLD-MCNC: 2.7 NG/ML (ref 5–15)

## 2025-05-08 PROCEDURE — 25000003 PHARM REV CODE 250: Performed by: STUDENT IN AN ORGANIZED HEALTH CARE EDUCATION/TRAINING PROGRAM

## 2025-05-08 PROCEDURE — 63600175 PHARM REV CODE 636 W HCPCS: Performed by: NURSE ANESTHETIST, CERTIFIED REGISTERED

## 2025-05-08 PROCEDURE — 37000009 HC ANESTHESIA EA ADD 15 MINS: Performed by: STUDENT IN AN ORGANIZED HEALTH CARE EDUCATION/TRAINING PROGRAM

## 2025-05-08 PROCEDURE — 37000008 HC ANESTHESIA 1ST 15 MINUTES: Performed by: STUDENT IN AN ORGANIZED HEALTH CARE EDUCATION/TRAINING PROGRAM

## 2025-05-08 PROCEDURE — 27201012 HC FORCEPS, HOT/COLD, DISP: Performed by: STUDENT IN AN ORGANIZED HEALTH CARE EDUCATION/TRAINING PROGRAM

## 2025-05-08 PROCEDURE — 43239 EGD BIOPSY SINGLE/MULTIPLE: CPT | Performed by: STUDENT IN AN ORGANIZED HEALTH CARE EDUCATION/TRAINING PROGRAM

## 2025-05-08 PROCEDURE — 88305 TISSUE EXAM BY PATHOLOGIST: CPT | Mod: 26,,, | Performed by: STUDENT IN AN ORGANIZED HEALTH CARE EDUCATION/TRAINING PROGRAM

## 2025-05-08 PROCEDURE — 88342 IMHCHEM/IMCYTCHM 1ST ANTB: CPT | Mod: 26,,, | Performed by: STUDENT IN AN ORGANIZED HEALTH CARE EDUCATION/TRAINING PROGRAM

## 2025-05-08 PROCEDURE — 88305 TISSUE EXAM BY PATHOLOGIST: CPT | Mod: TC | Performed by: STUDENT IN AN ORGANIZED HEALTH CARE EDUCATION/TRAINING PROGRAM

## 2025-05-08 RX ORDER — SODIUM CHLORIDE 9 MG/ML
INJECTION, SOLUTION INTRAVENOUS CONTINUOUS
Status: DISCONTINUED | OUTPATIENT
Start: 2025-05-08 | End: 2025-05-08 | Stop reason: HOSPADM

## 2025-05-08 RX ORDER — PROPOFOL 10 MG/ML
VIAL (ML) INTRAVENOUS
Status: DISCONTINUED | OUTPATIENT
Start: 2025-05-08 | End: 2025-05-08

## 2025-05-08 RX ORDER — TACROLIMUS 0.5 MG/1
CAPSULE ORAL
Qty: 90 CAPSULE | Refills: 3 | Status: SHIPPED | OUTPATIENT
Start: 2025-05-08

## 2025-05-08 RX ORDER — LIDOCAINE HYDROCHLORIDE 20 MG/ML
INJECTION INTRAVENOUS
Status: DISCONTINUED | OUTPATIENT
Start: 2025-05-08 | End: 2025-05-08

## 2025-05-08 RX ORDER — DEXTROMETHORPHAN/PSEUDOEPHED 2.5-7.5/.8
DROPS ORAL
Status: COMPLETED | OUTPATIENT
Start: 2025-05-08 | End: 2025-05-08

## 2025-05-08 RX ORDER — PROPOFOL 10 MG/ML
VIAL (ML) INTRAVENOUS CONTINUOUS PRN
Status: DISCONTINUED | OUTPATIENT
Start: 2025-05-08 | End: 2025-05-08

## 2025-05-08 RX ADMIN — PROPOFOL 90 MG: 10 INJECTION, EMULSION INTRAVENOUS at 12:05

## 2025-05-08 RX ADMIN — SODIUM CHLORIDE: 0.9 INJECTION, SOLUTION INTRAVENOUS at 12:05

## 2025-05-08 RX ADMIN — PROPOFOL 150 MCG/KG/MIN: 10 INJECTION, EMULSION INTRAVENOUS at 12:05

## 2025-05-08 RX ADMIN — LIDOCAINE HYDROCHLORIDE 100 MG: 20 INJECTION, SOLUTION INTRAVENOUS at 12:05

## 2025-05-08 NOTE — TRANSFER OF CARE
"Anesthesia Transfer of Care Note    Patient: Guillaume Salinas    Procedure(s) Performed: Procedure(s) (LRB):  EGD (ESOPHAGOGASTRODUODENOSCOPY) (N/A)    Patient location: GI    Anesthesia Type: general    Transport from OR: Transported from OR on room air with adequate spontaneous ventilation    Post pain: adequate analgesia    Post assessment: no apparent anesthetic complications    Post vital signs: stable    Level of consciousness: awake, alert and oriented    Nausea/Vomiting: no nausea/vomiting    Complications: none    Transfer of care protocol was followed      Last vitals: Visit Vitals  BP (!) 147/73   Pulse 63   Temp 36.7 °C (98.1 °F) (Skin)   Resp 14   Ht 5' 8" (1.727 m)   Wt 64.9 kg (143 lb)   SpO2 99%   BMI 21.74 kg/m²     "

## 2025-05-08 NOTE — ANESTHESIA PREPROCEDURE EVALUATION
[facility]  Anesthesia Pre-Operative Evaluation         Patient Name: Guillaume Salinas  YOB: 1955  MRN: [id number]    SUBJECTIVE:     Pre-operative evaluation for Procedure(s) (LRB):  EGD (ESOPHAGOGASTRODUODENOSCOPY) (N/A)     05/08/2025    Guillaume Salinas is a 69 y.o. male w/ a significant PMHx of anemia, CAD, HLD, CKD3, pancytopenia.  Patient now presents for the above procedure(s).    TTE:   Left Ventricle: The left ventricle is normal in size. Normal wall thickness. Normal wall motion. There is normal systolic function with a visually estimated ejection fraction of 60 - 65%. There is normal diastolic function. Normal left ventricular filling pressure. GLS is unreliable and cannot be reported accurately deu to poor endocardial visualization.    Right Ventricle: Normal right ventricular cavity size. Wall thickness is normal. Systolic function is normal.    Left Atrium: Normal left atrial size.    Right Atrium: Normal right atrial size.    Aortic Valve: The aortic valve is a trileaflet valve. There is mild aortic valve sclerosis. There is mild annular calcification present.    Mitral Valve: There is mild bileaflet sclerosis.    Aorta: Aortic root is normal in size measuring 3.21 cm. Ascending aorta is normal measuring 3.03 cm.    Pulmonary Artery: The estimated pulmonary artery systolic pressure is 28 mmHg.    IVC/SVC: Normal venous pressure at 3 mmHg.    Problem List[1]    Review of patient's allergies indicates:  No Known Allergies    Current Inpatient Medications:      Medications Ordered Prior to Encounter[2]    Past Surgical History:   Procedure Laterality Date    BONE MARROW BIOPSY Left 4/26/2023    Procedure: Biopsy-bone marrow;  Surgeon: Harry Diamond MD;  Location: [facility] OR;  Service: Oncology;  Laterality: Left;    COLONOSCOPY N/A 01/05/2022    Procedure: COLONOSCOPY Golytely Vaccinated will bring cards;  Surgeon: Dereje Simon MD;   Location: [facility];  Service: Endoscopy;  Laterality: N/A;  Do not cancel this order    INSERTION OF LEMONS CATHETER Right 9/13/2024    Procedure: INSERTION, CATHETER, CENTRAL VENOUS, LEMONS TRIPLE LUMEN;  Surgeon: Kg Patten MD;  Location: [facility] OR [address] [place] [address];  Service: General;  Laterality: Right;       OBJECTIVE:     Vital Signs Range (Last 24H):  Temp:  [36.6 °C (97.9 °F)]   Pulse:  [73]   BP: (134)/(68)   SpO2:  [97 %]       Significant Labs:  Lab Results   Component Value Date    WBC 2.69 (L) 05/07/2025    HGB 9.7 (L) 05/07/2025    HCT 28.7 (L) 05/07/2025    PLT 47 (L) 05/07/2025    CHOL 120 04/17/2023    TRIG 252 (H) 12/29/2024    HDL 25 (L) 04/17/2023    ALT 34 05/07/2025    AST 22 05/07/2025     05/07/2025    K 3.9 05/07/2025     05/07/2025    CREATININE 0.9 05/07/2025    BUN 27 (H) 05/07/2025    CO2 25 05/07/2025    PSA 2.6 08/27/2024    INR 1.0 05/07/2025    HGBA1C 5.2 10/19/2021       Diagnostic Studies: No relevant studies.    EKG:   Results for orders placed or performed during the hospital encounter of 12/26/24   EKG 12-lead    Collection Time: 12/29/24  9:02 PM   Result Value Ref Range    QRS Duration 76 ms    [facility] QTC Calculation 447 ms    Narrative    Test Reason : R00.0,    Vent. Rate : 125 BPM     Atrial Rate : 125 BPM     P-R Int : 126 ms          QRS Dur :  76 ms      QT Int : 310 ms       P-R-T Axes :  32 -54  37 degrees    QTcB Int : 447 ms    Sinus tachycardia  Possible Left atrial enlargement  Left anterior fascicular block  Abnormal ECG  When compared with ECG of 26-Dec-2024 11:28,  The axis Shifted left  Confirmed by Garry Brasher ([id number]) on 12/30/2024 7:53:23 AM    Referred By: AAAREFERRAL SELF           Confirmed By: Garry Brasher       ASSESSMENT/PLAN:           Pre-op Assessment    I have reviewed the Patient Summary Reports.     I have reviewed the Nursing Notes. I have reviewed the NPO Status.   I have reviewed the Medications.     Review of Systems  Anesthesia Hx:  No  problems with previous Anesthesia   History of prior surgery of interest to airway management or planning:          Denies Family Hx of Anesthesia complications.    Denies Personal Hx of Anesthesia complications.                    Social:  Non-Smoker, No Alcohol Use       Hematology/Oncology:       -- Anemia:                  Current/Recent Cancer.                EENT/Dental:         Denies Otitis Media        Cardiovascular:     Hypertension   CAD        Denies CHF.    no hyperlipidemia                               Pulmonary:   COPD                     Renal/:  Chronic Renal Disease, CKD                Hepatic/GI:      Denies GERD.                Musculoskeletal:  Denies Arthritis.               Neurological:    Denies CVA.             Denies Dementia                         Endocrine:  Denies Diabetes.           Psych:  Denies Psychiatric History.                  Physical Exam  General: Cooperative, Alert and Oriented    Airway:  Mallampati: III   Mouth Opening: Normal  TM Distance: Normal  Tongue: Normal  Neck ROM: Normal ROM    Dental:  Intact        Anesthesia Plan  Type of Anesthesia, risks & benefits discussed:    Anesthesia Type: Gen Natural Airway  Intra-op Monitoring Plan: Standard ASA Monitors  Post Op Pain Control Plan: multimodal analgesia  Induction:  IV  Informed Consent: Informed consent signed with the Patient and all parties understand the risks and agree with anesthesia plan.  All questions answered.   ASA Score: 3  Day of Surgery Review of History & Physical: H&P Update referred to the surgeon/provider.    Ready For Surgery From Anesthesia Perspective.     .           [1]   Patient Active Problem List  Diagnosis    Hyperlipidemia    Physical debility    Iliac artery stenosis, right    Coronary artery disease involving native coronary artery of native heart without angina pectoris    Personal history of nicotine dependence    Hypertension, essential    Colon polyps    Aortic atherosclerosis     Abnormal sensation of leg    Atherosclerosis of native artery of both lower extremities with intermittent claudication    Macrocytic anemia    Myelodysplastic syndrome    Thrombocytopenia    Immunodeficiency secondary to neoplasm    Chronic kidney disease, stage 3a    B12 deficiency    Decreased activity tolerance    Chemotherapy-induced neutropenia    Emphysema lung    S/P allogeneic bone marrow transplant    Abnormal PFT    Neuropathy    History of Clostridioides difficile infection    Insomnia    Neutropenic fever    Chemotherapy induced diarrhea    Headache    Hemorrhoids    Chemotherapy-induced nausea    Urinary frequency    Dry mouth    Moderate protein-calorie malnutrition    Electrolyte disorder    Hypomagnesemia    Palliative care by specialist    Decreased strength, endurance, and mobility    ADDISON (acute kidney injury)    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     Current Outpatient Medications on File Prior to Encounter   Medication Sig Dispense Refill    acyclovir (ZOVIRAX) 800 MG Tab Take 1 tablet (800 mg total) by mouth 2 (two) times daily. 60 tablet 11    atovaquone (MEPRON) 750 mg/5 mL Susp oral liquid Take 10 mLs (1,500 mg total) by mouth once daily. 210 mL 2    carvediloL (COREG) 3.125 MG tablet Take 1 tablet (3.125 mg total) by mouth 2 (two) times daily. 60 tablet 11    cyanocobalamin 1,000 mcg/mL injection Inject 1 mL (1,000 mcg total) into the muscle once a week. 10 mL 2    eltrombopag olamine (PROMACTA) 50 MG Tab Take 1 tablet (50 mg total) by mouth once daily. 30 tablet 11    folic acid (FOLVITE) 1 MG tablet Take 1 tablet (1 mg total) by mouth once daily. 100 tablet 3    gabapentin (NEURONTIN) 300 MG capsule Take 2 capsules (600 mg  total) by mouth nightly as needed (Restless leg). 60 capsule 2    hydrocortisone 1 % cream Apply to affected area 2 times daily 30 g 1    letermovir (PREVYMIS) 480 mg Tab Take 1 tablet (480 mg total) by mouth Daily. 28 tablet 9    levoFLOXacin (LEVAQUIN) 500 MG tablet Take 1 tablet (500 mg total) by mouth once daily. 30 tablet 5    loperamide (IMODIUM A-D) 2 mg Tab Take 2 mg by mouth 4 (four) times daily as needed.      magnesium oxide (MAG-OX) 400 mg (241.3 mg magnesium) tablet Take 2 tablets (800 mg total) by mouth 2 (two) times daily. 120 tablet 11    ondansetron (ZOFRAN-ODT) 8 MG TbDL Take 1 tablet (8 mg total) by mouth every 8 (eight) hours as needed (nausea). 30 tablet 1    pantoprazole (PROTONIX) 40 MG tablet Take 1 tablet (40 mg total) by mouth once daily. 30 tablet 3    posaconazole (NOXAFIL) 100 mg TbEC tablet Take 3 tablets (300 mg total) by mouth once daily. 90 tablet 11    rOPINIRole (REQUIP) 0.5 MG tablet Take 1 tablet (0.5 mg total) by mouth every evening. 30 tablet 11    tacrolimus (PROGRAF) 0.5 MG Cap Take 1 capsule (0.5 mg total) by mouth every morning. 90 capsule 3

## 2025-05-08 NOTE — ANESTHESIA POSTPROCEDURE EVALUATION
Anesthesia Post Evaluation    Patient: Guillaume Salinas    Procedure(s) Performed: Procedure(s) (LRB):  EGD (ESOPHAGOGASTRODUODENOSCOPY) (N/A)    Final Anesthesia Type: general      Patient location during evaluation: GI PACU  Patient participation: Yes- Able to Participate  Level of consciousness: awake and alert, oriented and awake  Post-procedure vital signs: reviewed and stable  Pain management: adequate  Airway patency: patent  SHANE mitigation strategies: Multimodal analgesia  PONV status at discharge: No PONV  Anesthetic complications: no      Cardiovascular status: blood pressure returned to baseline and hemodynamically stable  Respiratory status: unassisted and spontaneous ventilation  Hydration status: euvolemic  Follow-up not needed.              Vitals Value Taken Time   /72 05/08/25 13:06   Temp  05/08/25 14:28   Pulse 73 05/08/25 13:06   Resp 17 05/08/25 13:06   SpO2 100 % 05/08/25 13:06         Event Time   Out of Recovery 13:41:50         Pain/Vaibhav Score: Vaibhav Score: 10 (5/8/2025  1:07 PM)

## 2025-05-08 NOTE — PROVATION PATIENT INSTRUCTIONS
Discharge Summary/Instructions after an Endoscopic Procedure  Patient Name: Guillaume Salinas  Patient MRN: [id number]  Patient   YOB: 1955 Thursday, May 8, 2025  Dudley Toth MD  Dear patient,  As a result of recent federal legislation (The Federal Cures Act), you may   receive lab or pathology results from your procedure in your [facility]   account before your physician is able to contact you. Your physician or   their representative will relay the results to you with their   recommendations at their soonest availability.  Thank you,  Your health is very important to us during the Covid Crisis. Following your   procedure today, you will receive a daily text for 2 weeks asking about   signs or symptoms of Covid 19.  Please respond to this text when you   receive it so we can follow up and keep you as safe as possible.   RESTRICTIONS:  During your procedure today, you received medications for sedation.  These   medications may affect your judgment, balance and coordination.  Therefore,   for 24 hours, you have the following restrictions:   - DO NOT drive a car, operate machinery, make legal/financial decisions,   sign important papers or drink alcohol.    ACTIVITY:  Today: no heavy lifting, straining or running due to procedural   sedation/anesthesia.  The following day: return to full activity including work.  DIET:  Eat and drink normally unless instructed otherwise.     TREATMENT FOR COMMON SIDE EFFECTS:  - Mild abdominal pain, nausea, belching, bloating or excessive gas:  rest,   eat lightly and use a heating pad.  - Sore Throat: treat with throat lozenges and/or gargle with warm salt   water.  - Because air was used during the procedure, expelling large amounts of air   from your rectum or belching is normal.  - If a bowel prep was taken, you may not have a bowel movement for 1-3 days.    This is normal.  SYMPTOMS TO WATCH FOR AND REPORT TO YOUR PHYSICIAN:  1. Abdominal pain or bloating,  other than gas cramps.  2. Chest pain.  3. Back pain.  4. Signs of infection such as: chills or fever occurring within 24 hours   after the procedure.  5. Rectal bleeding, which would show as bright red, maroon, or black stools.   (A tablespoon of blood from the rectum is not serious, especially if   hemorrhoids are present.)  6. Vomiting.  7. Weakness or dizziness.  GO DIRECTLY TO THE NEAREST EMERGENCY ROOM IF YOU HAVE ANY OF THE FOLLOWING:      Difficulty breathing              Chills and/or fever over 101 F   Persistent vomiting and/or vomiting blood   Severe abdominal pain   Severe chest pain   Black, tarry stools   Bleeding- more than one tablespoon   Any other symptom or condition that you feel may need urgent attention  Your doctor recommends these additional instructions:  If any biopsies were taken, your doctors clinic will contact you in 1 to 2   weeks with any results.  - Discharge patient to home.   - Resume previous diet.   - Continue present medications.   - Await pathology results.  - Condition stable   - The signs and symptoms of potential delayed complications were discussed   with the patient. If signs or symptoms of these complications develop, call   the [facility] at [phone].   - Return to normal activities tomorrow.  Written discharge instructions were   provided to the patient.   For questions, problems or results please call your physician - Dudley Toth MD.  EMERGENCY PHONE NUMBER: [phone],  LAB RESULTS: [phone]  IF A COMPLICATION OR EMERGENCY SITUATION ARISES AND YOU ARE UNABLE TO REACH   YOUR PHYSICIAN - GO DIRECTLY TO THE EMERGENCY ROOM.  Dudley Toth MD  5/8/2025 1:19:06 PM  This report has been verified and signed electronically.  Dear patient,  As a result of recent federal legislation (The Federal Cures Act), you may   receive lab or pathology results from your procedure in your [facility]   account before your physician is able to contact you.  Your physician or   their representative will relay the results to you with their   recommendations at their soonest availability.  Thank you,  [employer]OVATION

## 2025-05-08 NOTE — PROGRESS NOTES
BMT Pharmacist Immunosuppression Note:    Reviewed patient's tacrolimus level with Dr. Hickey and it is below goal range.      Current regimen: 0.5 mg every morning  Capsule size(s): 0.5    Plan: Increase regimen to 1 capsules (0.5 mg) in the AM and 1 capsules (0.5 mg) in the evening.        Lab Results   Component Value Date    TACROLIMUS 2.7 (L) 05/07/2025    TACROLIMUS 4.5 (L) 04/30/2025    TACROLIMUS 8.2 04/23/2025       Creatinine   Date Value Ref Range Status   05/07/2025 0.9 0.5 - 1.4 mg/dL Final   04/30/2025 1.1 0.5 - 1.4 mg/dL Final   04/23/2025 1.3 0.5 - 1.4 mg/dL Final     Total Bilirubin   Date Value Ref Range Status   03/17/2025 0.5 0.1 - 1.0 mg/dL Final     Comment:     For infants and newborns, interpretation of results should be based  on gestational age, weight and in agreement with clinical  observations.    Premature Infant recommended reference ranges:  Up to 24 hours.............<8.0 mg/dL  Up to 48 hours............<12.0 mg/dL  3-5 days..................<15.0 mg/dL  6-29 days.................<15.0 mg/dL     03/10/2025 0.4 0.1 - 1.0 mg/dL Final     Comment:     For infants and newborns, interpretation of results should be based  on gestational age, weight and in agreement with clinical  observations.    Premature Infant recommended reference ranges:  Up to 24 hours.............<8.0 mg/dL  Up to 48 hours............<12.0 mg/dL  3-5 days..................<15.0 mg/dL  6-29 days.................<15.0 mg/dL     03/03/2025 0.5 0.1 - 1.0 mg/dL Final     Comment:     For infants and newborns, interpretation of results should be based  on gestational age, weight and in agreement with clinical  observations.    Premature Infant recommended reference ranges:  Up to 24 hours.............<8.0 mg/dL  Up to 48 hours............<12.0 mg/dL  3-5 days..................<15.0 mg/dL  6-29 days.................<15.0 mg/dL       Bilirubin Total   Date Value Ref Range Status   05/07/2025 0.6 0.1 - 1.0 mg/dL Final      Comment:     For infants and newborns, interpretation of results should be based   on gestational age, weight and in agreement with clinical   observations.    Premature Infant recommended reference ranges:   0-24 hours:  <8.0 mg/dL   24-48 hours: <12.0 mg/dL   3-5 days:    <15.0 mg/dL   6-29 days:   <15.0 mg/dL   04/30/2025 0.7 0.1 - 1.0 mg/dL Final     Comment:     For infants and newborns, interpretation of results should be based   on gestational age, weight and in agreement with clinical   observations.    Premature Infant recommended reference ranges:   0-24 hours:  <8.0 mg/dL   24-48 hours: <12.0 mg/dL   3-5 days:    <15.0 mg/dL   6-29 days:   <15.0 mg/dL   04/23/2025 0.6 0.1 - 1.0 mg/dL Final     Comment:     For infants and newborns, interpretation of results should be based   on gestational age, weight and in agreement with clinical   observations.    Premature Infant recommended reference ranges:   0-24 hours:  <8.0 mg/dL   24-48 hours: <12.0 mg/dL   3-5 days:    <15.0 mg/dL   6-29 days:   <15.0 mg/dL     AST   Date Value Ref Range Status   05/07/2025 22 11 - 45 unit/L Final   04/30/2025 31 11 - 45 unit/L Final   04/23/2025 37 11 - 45 unit/L Final   03/17/2025 31 10 - 40 U/L Final   03/10/2025 23 10 - 40 U/L Final   03/03/2025 26 10 - 40 U/L Final     ALT   Date Value Ref Range Status   05/07/2025 34 10 - 44 unit/L Final   04/30/2025 46 (H) 10 - 44 unit/L Final   04/23/2025 33 10 - 44 unit/L Final   03/17/2025 35 10 - 44 U/L Final   03/10/2025 31 10 - 44 U/L Final   03/03/2025 36 10 - 44 U/L Final             Halima Thomas, PharmD  Clinical Pharmacist-BMT/Hematology  [facility]

## 2025-05-09 ENCOUNTER — CLINICAL SUPPORT (OUTPATIENT)
Dept: REHABILITATION | Facility: HOSPITAL | Age: 70
End: 2025-05-09
Payer: MEDICARE

## 2025-05-09 DIAGNOSIS — Z74.09 DECREASED STRENGTH, ENDURANCE, AND MOBILITY: Primary | ICD-10-CM

## 2025-05-09 DIAGNOSIS — R53.1 DECREASED STRENGTH, ENDURANCE, AND MOBILITY: Primary | ICD-10-CM

## 2025-05-09 DIAGNOSIS — R68.89 DECREASED STRENGTH, ENDURANCE, AND MOBILITY: Primary | ICD-10-CM

## 2025-05-09 PROCEDURE — 97530 THERAPEUTIC ACTIVITIES: CPT | Mod: PN

## 2025-05-09 PROCEDURE — 97112 NEUROMUSCULAR REEDUCATION: CPT | Mod: PN

## 2025-05-09 NOTE — PROGRESS NOTES
"[facility]   Physical Therapy Treatment Note     Name: Guillaume Salinas  Clinic Number: [id number]    Therapy Diagnosis:   Encounter Diagnosis   Name Primary?    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 No new complaints.    He was not compliant with home exercise program.  Response to previous treatment: very good, a little tired  Functional change: ongoing    Pain: 0/10  Location: bilateral legs     Objective    5/9/2025    30 second sit to stand: 16 reps without use of hands  6 Minute walk test: 1391 ft         Treatment     Guillaume received the treatments listed below:      neuromuscular re-education activities to improve: Balance, Coordination, Proprioception, and Posture for 30 minutes. The following activities were included:  Cybex leg extension 15lb 3x10   Standing hip abduction Red theraband 2x10 ea  Standing hip extension Red theraband 2x10 ea   Cybex leg press 5 plates 2x10   Standing shoulder extension green theraband 3x10   Rows green theraband 3x10   Standing T's green theraband 3x10       therapeutic activities to improve functional performance for 22 minutes, including:  reassessment  Nustep x 8 minutes (NT)  Standing hip abduction RTB 3x10 ea  Standing heel raises 3x10   Step ups 6" x10 ea  Lateral step up 6" x10 ea "       NT:  Supine active range of motion shoulder flexion 3lb wand x30   Supine chest press 3lb wand 3x10  Seated Bicep curl 3lb 3x10 ea   5 minute walk around clinic   Sit to stand from raised mat x10 (only performed 8 before needing to stop)      Patient Education and Home Exercises       Education provided:   - updated Home exercise program     Written Home Exercises Provided: Pt instructed to continue prior HEP. Exercises were reviewed and Guillaume was able to demonstrate them prior to the end of the session.  Guillaume demonstrated good  understanding of the education provided. See Electronic Medical Record under Patient Instructions for exercises provided during therapy sessions    Assessment     Patient was reassessed today and displayed improvements in endurance with 6 Minute walk test as well as with functional strength with 30 second sit to stand. He was challenged with all strengthening exercises today and had greatest difficulty with leg press. He is slowly progressing towards his goals.   Guillaume Is progressing well towards his goals.   Patient prognosis is Fair.     Patient will continue to benefit from skilled outpatient physical therapy to address the deficits listed in the problem list box on initial evaluation, provide pt/family education and to maximize pt's level of independence in the home and community environment.     Patient's spiritual, cultural and educational needs considered and pt agreeable to plan of care and goals.     Anticipated barriers to physical therapy: co-morbidities    Goals: Short Term Goals: 6 weeks   Patient will be independent with Home exercise program to supplement therapy progressing, not met   Patient will be able to ambulate 1300 ft with 6 Minute walk test to improve endurance for community mobility  met  Patient will be able to lift and carry groceries without difficulty to improve ability to perform functional tasks  met     Long Term Goals: 12 weeks   Patient will be  able to ambulate 1400 ft or more with 6 Minute walk test to improve endurance for community mobility progressing, not met  Patient will be able to perform 16 sit to stand on 30 second sit to  order to improve functional strength and endurance for transfers met  Patient will improve FOTO score to 65 % to improve self perceived ability to perform functional tasks progressing, not met  Patient goal: Patient will be able to mow his lawn to return to prior level of function progressing, not met    Plan     Improve functional strength and endurance    Elvira Joyce, PT ,DPT,OCS      05/09/2025

## 2025-05-12 DIAGNOSIS — K20.90 ESOPHAGITIS: ICD-10-CM

## 2025-05-12 RX ORDER — ONDANSETRON 8 MG/1
8 TABLET, ORALLY DISINTEGRATING ORAL EVERY 8 HOURS PRN
Qty: 30 TABLET | Refills: 1 | Status: SHIPPED | OUTPATIENT
Start: 2025-05-12

## 2025-05-12 RX ORDER — PANTOPRAZOLE SODIUM 40 MG/1
40 TABLET, DELAYED RELEASE ORAL DAILY
Qty: 30 TABLET | Refills: 3 | Status: SHIPPED | OUTPATIENT
Start: 2025-05-12 | End: 2025-09-09

## 2025-05-13 ENCOUNTER — TELEPHONE (OUTPATIENT)
Dept: PALLIATIVE MEDICINE | Facility: CLINIC | Age: 70
End: 2025-05-13
Payer: MEDICARE

## 2025-05-13 ENCOUNTER — CLINICAL SUPPORT (OUTPATIENT)
Dept: REHABILITATION | Facility: HOSPITAL | Age: 70
End: 2025-05-13
Payer: MEDICARE

## 2025-05-13 DIAGNOSIS — R68.89 DECREASED ACTIVITY TOLERANCE: ICD-10-CM

## 2025-05-13 DIAGNOSIS — Z74.09 DECREASED STRENGTH, ENDURANCE, AND MOBILITY: Primary | ICD-10-CM

## 2025-05-13 DIAGNOSIS — D46.9 MYELODYSPLASTIC SYNDROME: ICD-10-CM

## 2025-05-13 DIAGNOSIS — R68.89 DECREASED STRENGTH, ENDURANCE, AND MOBILITY: Primary | ICD-10-CM

## 2025-05-13 DIAGNOSIS — R53.1 DECREASED STRENGTH, ENDURANCE, AND MOBILITY: Primary | ICD-10-CM

## 2025-05-13 DIAGNOSIS — Z94.81 S/P ALLOGENEIC BONE MARROW TRANSPLANT: ICD-10-CM

## 2025-05-13 PROCEDURE — 97112 NEUROMUSCULAR REEDUCATION: CPT | Mod: PN,CQ

## 2025-05-13 PROCEDURE — 97530 THERAPEUTIC ACTIVITIES: CPT | Mod: PN,CQ

## 2025-05-13 NOTE — TELEPHONE ENCOUNTER
Hearing Aid Follow up    Patient seen today for routine 6 months follow up. No problems reported at this time.  Re-tubed both devices as obvious hardening noticed.  Also, disengaged 'flight mode' status light as it continues to illuminate.  No other adjustments performed at this time.  Fred is to return to clinic in 6 months for next follow up to have new impressions taken, annual evaluation and possible hearing aid consult.  Per his grandmother, the battery drainage is excessive on current devices, therefore rechargeable option will be discussed/taken into consideration.  The disadvantage is the likelihood of Fred forgetting to charge devices resulting in dead batteries during school hours.  This will be discussed at next appt to determine best fit moving forward.      Unable to confirm appointment in palliative medicine left voicemail and call back number .

## 2025-05-13 NOTE — PROGRESS NOTES
"[facility]   Physical Therapy Treatment Note     Name: Guillaume Salinas  Clinic Number: [id number]    Therapy Diagnosis:   No diagnosis found.      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 No new complaints.    He was not compliant with home exercise program.  Response to previous treatment: very good, a little tired  Functional change: ongoing    Pain: 0/10  Location: bilateral legs     Objective    5/9/2025    30 second sit to stand: 16 reps without use of hands  6 Minute walk test: 1391 ft         Treatment     Guillaume received the treatments listed below:      neuromuscular re-education activities to improve: Balance, Coordination, Proprioception, and Posture for 30 minutes. The following activities were included:  Cybex leg extension 15lb 3x10   Standing hip abduction Red theraband 2x10 ea  Standing hip extension Red theraband 2x10 ea   Cybex leg press 5 plates 2x10   Standing shoulder extension green theraband 3x10   Rows green theraband 3x10   Standing T's green theraband 3x10       therapeutic activities to improve functional performance for 22 minutes, including:  reassessment  Nustep x 8 minutes (NT)  Standing hip abduction RTB 3x10 ea  Standing heel raises 3x10   Step ups 6" x10 ea  Lateral step up 6" x10 ea       NT:  Supine active range of motion shoulder flexion 3lb wand x30 "   Supine chest press 3lb wand 3x10  Seated Bicep curl 3lb 3x10 ea   5 minute walk around clinic   Sit to stand from raised mat x10 (only performed 8 before needing to stop)      Patient Education and Home Exercises       Education provided:   - updated Home exercise program     Written Home Exercises Provided: Pt instructed to continue prior HEP. Exercises were reviewed and Guillaume was able to demonstrate them prior to the end of the session.  Guillaume demonstrated good  understanding of the education provided. See Electronic Medical Record under Patient Instructions for exercises provided during therapy sessions    Assessment     Patient was reassessed today and displayed improvements in endurance with 6 Minute walk test as well as with functional strength with 30 second sit to stand. He was challenged with all strengthening exercises today and had greatest difficulty with leg press. He is slowly progressing towards his goals.   Guillaume Is progressing well towards his goals.   Patient prognosis is Fair.     Patient will continue to benefit from skilled outpatient physical therapy to address the deficits listed in the problem list box on initial evaluation, provide pt/family education and to maximize pt's level of independence in the home and community environment.     Patient's spiritual, cultural and educational needs considered and pt agreeable to plan of care and goals.     Anticipated barriers to physical therapy: co-morbidities    Goals: Short Term Goals: 6 weeks   Patient will be independent with Home exercise program to supplement therapy progressing, not met   Patient will be able to ambulate 1300 ft with 6 Minute walk test to improve endurance for community mobility  met  Patient will be able to lift and carry groceries without difficulty to improve ability to perform functional tasks  met     Long Term Goals: 12 weeks   Patient will be able to ambulate 1400 ft or more with 6 Minute walk test to improve  endurance for community mobility progressing, not met  Patient will be able to perform 16 sit to stand on 30 second sit to  order to improve functional strength and endurance for transfers met  Patient will improve FOTO score to 65 % to improve self perceived ability to perform functional tasks progressing, not met  Patient goal: Patient will be able to mow his lawn to return to prior level of function progressing, not met    Plan     Improve functional strength and endurance    Julian Morrow, PTA       05/13/2025

## 2025-05-13 NOTE — PROGRESS NOTES
[facility]   Physical Therapy Treatment Note     Name: Guillaume Salinas  Clinic Number: [id number]    Therapy Diagnosis:   Encounter Diagnoses   Name Primary?    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 No new complaints.  Getting stronger  He was not compliant with home exercise program.  Response to previous treatment: very good, a little tired  Functional change: ongoing    Pain: 0/10  Location: bilateral legs     Objective    5/9/2025    30 second sit to stand: 16 reps without use of hands  6 Minute walk test: 1391 ft         Treatment     Guillaume received the treatments listed below:      neuromuscular re-education activities to improve: Balance, Coordination, Proprioception, and Posture for 23 minutes. The following activities were included:  Cybex leg extension 15lb 3x10 (NP)  Standing hip abduction Green theraband 2x10 ea  Standing hip extension Green  theraband 2x10 ea   Cybex leg press 5 plates 2x10   Standing shoulder extension green theraband 3x10   Rows green theraband 3x10   Standing T's green theraband 3x10       therapeutic activities to improve functional performance for 22 minutes, including:  reassessment  Nustep x 8  "minutes (NP)  Standing heel raises 3x10   Step ups 6" 2x10 ea  Lateral step up 6" x10 ea       NT:  Supine active range of motion shoulder flexion 3lb wand x30   Supine chest press 3lb wand 3x10  Seated Bicep curl 3lb 3x10 ea   5 minute walk around clinic   Sit to stand from raised mat x10 (only performed 8 before needing to stop)      Patient Education and Home Exercises       Education provided:   - updated Home exercise program     Written Home Exercises Provided: Pt instructed to continue prior HEP. Exercises were reviewed and Guillaume was able to demonstrate them prior to the end of the session.  Guillaume demonstrated good  understanding of the education provided. See Electronic Medical Record under Patient Instructions for exercises provided during therapy sessions    Assessment     Patient presents for treatment ready to work. Was able to mostly perform all previously completed interventions with the exception for the Leg Press and NuStep. He fatigued quickly at about 35 minutes and opted to exit about 5 minutes later. He needed several brief seated rest breaks throughout treatment today. Fair tolerance.  Guillaume Is progressing well towards his goals.   Patient prognosis is Fair.     Patient will continue to benefit from skilled outpatient physical therapy to address the deficits listed in the problem list box on initial evaluation, provide pt/family education and to maximize pt's level of independence in the home and community environment.     Patient's spiritual, cultural and educational needs considered and pt agreeable to plan of care and goals.     Anticipated barriers to physical therapy: co-morbidities    Goals: Short Term Goals: 6 weeks   Patient will be independent with Home exercise program to supplement therapy progressing, not met   Patient will be able to ambulate 1300 ft with 6 Minute walk test to improve endurance for community mobility  met  Patient will be able to lift and carry groceries without " difficulty to improve ability to perform functional tasks  met     Long Term Goals: 12 weeks   Patient will be able to ambulate 1400 ft or more with 6 Minute walk test to improve endurance for community mobility progressing, not met  Patient will be able to perform 16 sit to stand on 30 second sit to  order to improve functional strength and endurance for transfers met  Patient will improve FOTO score to 65 % to improve self perceived ability to perform functional tasks progressing, not met  Patient goal: Patient will be able to mow his lawn to return to prior level of function progressing, not met    Plan     Improve functional strength and endurance    Lux Calabrese, PTA       05/13/2025

## 2025-05-14 ENCOUNTER — LAB VISIT (OUTPATIENT)
Dept: LAB | Facility: HOSPITAL | Age: 70
End: 2025-05-14
Attending: INTERNAL MEDICINE
Payer: MEDICARE

## 2025-05-14 DIAGNOSIS — D46.9 MYELODYSPLASTIC SYNDROME: ICD-10-CM

## 2025-05-14 DIAGNOSIS — Z76.82 STEM CELL TRANSPLANT CANDIDATE: ICD-10-CM

## 2025-05-14 LAB
ABSOLUTE NEUTROPHIL MANUAL (OHS): 1.4 K/UL
ALBUMIN SERPL BCP-MCNC: 3.1 G/DL (ref 3.5–5.2)
ALP SERPL-CCNC: 84 UNIT/L (ref 40–150)
ALT SERPL W/O P-5'-P-CCNC: 18 UNIT/L (ref 10–44)
ANION GAP (OHS): 9 MMOL/L (ref 8–16)
ANISOCYTOSIS BLD QL SMEAR: SLIGHT
AST SERPL-CCNC: 18 UNIT/L (ref 11–45)
BILIRUB SERPL-MCNC: 0.5 MG/DL (ref 0.1–1)
BUN SERPL-MCNC: 26 MG/DL (ref 8–23)
CALCIUM SERPL-MCNC: 8.8 MG/DL (ref 8.7–10.5)
CHLORIDE SERPL-SCNC: 106 MMOL/L (ref 95–110)
CO2 SERPL-SCNC: 25 MMOL/L (ref 23–29)
CREAT SERPL-MCNC: 1 MG/DL (ref 0.5–1.4)
DHEA SERPL-MCNC: NORMAL
EOSINOPHIL NFR BLD MANUAL: 3 % (ref 0–8)
ERYTHROCYTE [DISTWIDTH] IN BLOOD BY AUTOMATED COUNT: 16.1 % (ref 11.5–14.5)
ESTROGEN SERPL-MCNC: NORMAL PG/ML
GFR SERPLBLD CREATININE-BSD FMLA CKD-EPI: >60 ML/MIN/1.73/M2
GLUCOSE SERPL-MCNC: 126 MG/DL (ref 70–110)
HCT VFR BLD AUTO: 29.8 % (ref 40–54)
HGB BLD-MCNC: 10.1 GM/DL (ref 14–18)
INSULIN SERPL-ACNC: NORMAL U[IU]/ML
LAB AP CLINICAL INFORMATION: NORMAL
LAB AP GROSS DESCRIPTION: NORMAL
LAB AP PERFORMING LOCATION(S): NORMAL
LAB AP REPORT FOOTNOTES: NORMAL
LDH SERPL-CCNC: 245 U/L (ref 110–260)
LYMPHOCYTES NFR BLD MANUAL: 13 % (ref 18–48)
MAGNESIUM SERPL-MCNC: 1.8 MG/DL (ref 1.6–2.6)
MCH RBC QN AUTO: 39.9 PG (ref 27–31)
MCHC RBC AUTO-ENTMCNC: 33.9 G/DL (ref 32–36)
MCV RBC AUTO: 118 FL (ref 82–98)
MONOCYTES NFR BLD MANUAL: 10 % (ref 4–15)
NEUTROPHILS NFR BLD MANUAL: 74 % (ref 38–73)
NUCLEATED RBC (/100WBC) (OHS): 0 /100 WBC
PHOSPHATE SERPL-MCNC: 4.2 MG/DL (ref 2.7–4.5)
PLATELET # BLD AUTO: 49 K/UL (ref 150–450)
PLATELET BLD QL SMEAR: ABNORMAL
PMV BLD AUTO: 12.3 FL (ref 9.2–12.9)
POTASSIUM SERPL-SCNC: 4.3 MMOL/L (ref 3.5–5.1)
PROT SERPL-MCNC: 5.3 GM/DL (ref 6–8.4)
RBC # BLD AUTO: 2.53 M/UL (ref 4.6–6.2)
SODIUM SERPL-SCNC: 140 MMOL/L (ref 136–145)
URATE SERPL-MCNC: 4.4 MG/DL (ref 3.4–7)
WBC # BLD AUTO: 1.95 K/UL (ref 3.9–12.7)

## 2025-05-14 PROCEDURE — 84550 ASSAY OF BLOOD/URIC ACID: CPT

## 2025-05-14 PROCEDURE — 80053 COMPREHEN METABOLIC PANEL: CPT

## 2025-05-14 PROCEDURE — 87799 DETECT AGENT NOS DNA QUANT: CPT

## 2025-05-14 PROCEDURE — 84100 ASSAY OF PHOSPHORUS: CPT

## 2025-05-14 PROCEDURE — 80197 ASSAY OF TACROLIMUS: CPT

## 2025-05-14 PROCEDURE — 83615 LACTATE (LD) (LDH) ENZYME: CPT

## 2025-05-14 PROCEDURE — 85027 COMPLETE CBC AUTOMATED: CPT

## 2025-05-14 PROCEDURE — 36415 COLL VENOUS BLD VENIPUNCTURE: CPT

## 2025-05-14 PROCEDURE — 83735 ASSAY OF MAGNESIUM: CPT

## 2025-05-15 ENCOUNTER — RESULTS FOLLOW-UP (OUTPATIENT)
Dept: HEMATOLOGY/ONCOLOGY | Facility: CLINIC | Age: 70
End: 2025-05-15

## 2025-05-15 ENCOUNTER — OFFICE VISIT (OUTPATIENT)
Dept: PALLIATIVE MEDICINE | Facility: CLINIC | Age: 70
End: 2025-05-15
Payer: MEDICARE

## 2025-05-15 VITALS
DIASTOLIC BLOOD PRESSURE: 58 MMHG | WEIGHT: 144.81 LBS | OXYGEN SATURATION: 97 % | HEART RATE: 90 BPM | BODY MASS INDEX: 22.02 KG/M2 | SYSTOLIC BLOOD PRESSURE: 107 MMHG

## 2025-05-15 DIAGNOSIS — G47.00 INSOMNIA, UNSPECIFIED TYPE: ICD-10-CM

## 2025-05-15 DIAGNOSIS — Z51.5 ENCOUNTER FOR PALLIATIVE CARE: ICD-10-CM

## 2025-05-15 DIAGNOSIS — D46.9 MYELODYSPLASTIC SYNDROME: Primary | ICD-10-CM

## 2025-05-15 DIAGNOSIS — Z71.89 ADVANCED CARE PLANNING/COUNSELING DISCUSSION: ICD-10-CM

## 2025-05-15 DIAGNOSIS — M79.604 PAIN IN BOTH LOWER EXTREMITIES: ICD-10-CM

## 2025-05-15 DIAGNOSIS — R53.0 NEOPLASTIC (MALIGNANT) RELATED FATIGUE: ICD-10-CM

## 2025-05-15 DIAGNOSIS — Z91.89 ADJUSTMENT TO LIFE THREATENING ILLNESS: ICD-10-CM

## 2025-05-15 DIAGNOSIS — M79.605 PAIN IN BOTH LOWER EXTREMITIES: ICD-10-CM

## 2025-05-15 LAB
CYTOMEGALOVIRUS DNA, QUAL (OHS): NOT DETECTED
EPSTEIN-BARR VIRUS DNA, QUAL (OHS): NORMAL
TACROLIMUS BLD-MCNC: 7.6 NG/ML (ref 5–15)

## 2025-05-15 PROCEDURE — 3078F DIAST BP <80 MM HG: CPT | Mod: CPTII,S$GLB,, | Performed by: STUDENT IN AN ORGANIZED HEALTH CARE EDUCATION/TRAINING PROGRAM

## 2025-05-15 PROCEDURE — 1101F PT FALLS ASSESS-DOCD LE1/YR: CPT | Mod: CPTII,S$GLB,, | Performed by: STUDENT IN AN ORGANIZED HEALTH CARE EDUCATION/TRAINING PROGRAM

## 2025-05-15 PROCEDURE — 3074F SYST BP LT 130 MM HG: CPT | Mod: CPTII,S$GLB,, | Performed by: STUDENT IN AN ORGANIZED HEALTH CARE EDUCATION/TRAINING PROGRAM

## 2025-05-15 PROCEDURE — 1159F MED LIST DOCD IN RCRD: CPT | Mod: CPTII,S$GLB,, | Performed by: STUDENT IN AN ORGANIZED HEALTH CARE EDUCATION/TRAINING PROGRAM

## 2025-05-15 PROCEDURE — 99999 PR PBB SHADOW E&M-EST. PATIENT-LVL III: CPT | Mod: PBBFAC,,, | Performed by: STUDENT IN AN ORGANIZED HEALTH CARE EDUCATION/TRAINING PROGRAM

## 2025-05-15 PROCEDURE — 1123F ACP DISCUSS/DSCN MKR DOCD: CPT | Mod: CPTII,S$GLB,, | Performed by: STUDENT IN AN ORGANIZED HEALTH CARE EDUCATION/TRAINING PROGRAM

## 2025-05-15 PROCEDURE — 99213 OFFICE O/P EST LOW 20 MIN: CPT | Mod: S$GLB,,, | Performed by: STUDENT IN AN ORGANIZED HEALTH CARE EDUCATION/TRAINING PROGRAM

## 2025-05-15 PROCEDURE — 1160F RVW MEDS BY RX/DR IN RCRD: CPT | Mod: CPTII,S$GLB,, | Performed by: STUDENT IN AN ORGANIZED HEALTH CARE EDUCATION/TRAINING PROGRAM

## 2025-05-15 PROCEDURE — 3008F BODY MASS INDEX DOCD: CPT | Mod: CPTII,S$GLB,, | Performed by: STUDENT IN AN ORGANIZED HEALTH CARE EDUCATION/TRAINING PROGRAM

## 2025-05-15 PROCEDURE — 3288F FALL RISK ASSESSMENT DOCD: CPT | Mod: CPTII,S$GLB,, | Performed by: STUDENT IN AN ORGANIZED HEALTH CARE EDUCATION/TRAINING PROGRAM

## 2025-05-15 NOTE — PROGRESS NOTES
BMT Pharmacist Immunosuppression Note:    Reviewed patient's tacrolimus level with Dr. Hickey and it is within goal range.      Current regimen: 0.5 mg BID   Capsule size(s): 0.5    Plan: Continue current regimen.        Lab Results   Component Value Date    TACROLIMUS 7.6 05/14/2025    TACROLIMUS 2.7 (L) 05/07/2025    TACROLIMUS 4.5 (L) 04/30/2025       Creatinine   Date Value Ref Range Status   05/14/2025 1.0 0.5 - 1.4 mg/dL Final   05/07/2025 0.9 0.5 - 1.4 mg/dL Final   04/30/2025 1.1 0.5 - 1.4 mg/dL Final     Total Bilirubin   Date Value Ref Range Status   03/17/2025 0.5 0.1 - 1.0 mg/dL Final     Comment:     For infants and newborns, interpretation of results should be based  on gestational age, weight and in agreement with clinical  observations.    Premature Infant recommended reference ranges:  Up to 24 hours.............<8.0 mg/dL  Up to 48 hours............<12.0 mg/dL  3-5 days..................<15.0 mg/dL  6-29 days.................<15.0 mg/dL     03/10/2025 0.4 0.1 - 1.0 mg/dL Final     Comment:     For infants and newborns, interpretation of results should be based  on gestational age, weight and in agreement with clinical  observations.    Premature Infant recommended reference ranges:  Up to 24 hours.............<8.0 mg/dL  Up to 48 hours............<12.0 mg/dL  3-5 days..................<15.0 mg/dL  6-29 days.................<15.0 mg/dL     03/03/2025 0.5 0.1 - 1.0 mg/dL Final     Comment:     For infants and newborns, interpretation of results should be based  on gestational age, weight and in agreement with clinical  observations.    Premature Infant recommended reference ranges:  Up to 24 hours.............<8.0 mg/dL  Up to 48 hours............<12.0 mg/dL  3-5 days..................<15.0 mg/dL  6-29 days.................<15.0 mg/dL       Bilirubin Total   Date Value Ref Range Status   05/14/2025 0.5 0.1 - 1.0 mg/dL Final     Comment:     For infants and newborns, interpretation of results  should be based   on gestational age, weight and in agreement with clinical   observations.    Premature Infant recommended reference ranges:   0-24 hours:  <8.0 mg/dL   24-48 hours: <12.0 mg/dL   3-5 days:    <15.0 mg/dL   6-29 days:   <15.0 mg/dL   05/07/2025 0.6 0.1 - 1.0 mg/dL Final     Comment:     For infants and newborns, interpretation of results should be based   on gestational age, weight and in agreement with clinical   observations.    Premature Infant recommended reference ranges:   0-24 hours:  <8.0 mg/dL   24-48 hours: <12.0 mg/dL   3-5 days:    <15.0 mg/dL   6-29 days:   <15.0 mg/dL   04/30/2025 0.7 0.1 - 1.0 mg/dL Final     Comment:     For infants and newborns, interpretation of results should be based   on gestational age, weight and in agreement with clinical   observations.    Premature Infant recommended reference ranges:   0-24 hours:  <8.0 mg/dL   24-48 hours: <12.0 mg/dL   3-5 days:    <15.0 mg/dL   6-29 days:   <15.0 mg/dL     AST   Date Value Ref Range Status   05/14/2025 18 11 - 45 unit/L Final   05/07/2025 22 11 - 45 unit/L Final   04/30/2025 31 11 - 45 unit/L Final   03/17/2025 31 10 - 40 U/L Final   03/10/2025 23 10 - 40 U/L Final   03/03/2025 26 10 - 40 U/L Final     ALT   Date Value Ref Range Status   05/14/2025 18 10 - 44 unit/L Final   05/07/2025 34 10 - 44 unit/L Final   04/30/2025 46 (H) 10 - 44 unit/L Final   03/17/2025 35 10 - 44 U/L Final   03/10/2025 31 10 - 44 U/L Final   03/03/2025 36 10 - 44 U/L Final             Cristofer Kruse, PharmD  [id number]

## 2025-05-15 NOTE — PROGRESS NOTES
Palliative Medicine Clinic Note        Consult Requested By: Dr. Mohit Hickey      Reason for Consult/Chief Complaint: Symptom management and ACP in the setting of MDS    Patient was offered a  by this clinic/provider for the visit; he declined and requested that his wife serve as his      ASSESSMENT/PLAN:      Plan/Recommendations:    Guillaume was seen today for follow up.    Diagnoses and all orders for this visit:    Myelodysplastic syndrome/Stem cell transplant candidate  - followed by Dr. Hickey  - s/p haploSCT conditioned with +2Gy TBI  - currently on promacta  - s/p bone marrow biopsy after day 100 completed on 12/31/2024   - recently hospitalized at end of December 2024    Encounter for palliative care/Advance Care Planning   - patient decisional and accompanied by his wife today  - no ACP documents uploaded into EMR  - philosophy of palliative medicine and new patient folder in English provided at first visit, though requested that [facility] staff send new patient folder information in Sami to patient after completion of visit. [facility] RN acknowledged request and reports she will send the Sami information to patient/family  - new patient folder in English and ACP booklet in English and Sami provided to patient/family previously   - patient to review with family and will work to complete documents at future visits  - goals: life prolonging  - code status not discussed     Pain in both lower extremities/Abdominal pain  - stable/controlled   - at previous visit he notes that his legs hurt with walking; he has been diagnosed with PAD previously. He has been unable to complete treatment for PAD secondary to treatment of MDS. Pain improves with rest  - patient with weakness in left arm and legs; patient working with PT  - injection site pain resolved  - does not need opioid medication at this time  - opioid safety sheet in new patient folder for patient/family to  review  - will prescribe narcan in future if opioids are needed    Neoplastic (malignant) related fatigue/Insomnia  - patient with improved fatigue and insomnia; still with ongoing insomnia though notes he feels more rested in AM when he wakes up  - he notes worsening fatigue on treatment days  - at first visit: he notes that insomnia is his worst problem. He has been having worsening anxiety for past two to three years  - patient still wakes up multiple times at night   - patient now taking ambien 5 mg qhs and ropinirole 0.5 mg qhs  - discussed sleep hygiene and tip sheet for better sleep in new patient folder for patient/family to review    Adjustment to life threatening illness  - patient denies any anxiety/depression on ESAS and feels that his mood is good  - he reports that he is not consciously aware of any worries but he is not sure if subconsciously his worries are affecting him  - emotional support provided today  - hold on pharmacologic intervention at this time    Advance Care Planning   Advance Directives:   Living Will: No    [place]: No    Do Not Resuscitate Status: No    Medical Power of : No        Oral Declaration: Yes   Witnesses:  Niesha Patrick LCSW   Agent's Name:  Yennifer Thomas   Agent's Contact Number:  [phone]    Decision Making:  Patient answered questions and Family answered questions  Goals of Care: Advance Care Planning    Date: 05/15/2025    [facility]  I engaged the patient and family in a voluntary conversation about advance care planning and we specifically addressed what the goals of care would be moving forward, in light of the patient's change in clinical status, specifically MDS.  We did specifically address the patient's likely prognosis, which is good .  We explored the patient's values and preferences for future care.  The patient and family endorses that what is most important right now is to focus on remaining as independent as possible    Accordingly, we have  decided that the best plan to meet the patient's goals includes continuing with treatment         Follow up: 12 weeks     Plan discussed with: note available for review by oncology team       SUBJECTIVE:      History of Present Illness / Interval History:  Guillaume Salinas is 69 y.o. male with emphysema, right iliac artery stenosis, CAD, HTN, HLD, CKD stage III, and MDS presents to Palliative Care Clinic for physical symptoms, advance care planning,, clarification of goals of care, and additional support.. Please see oncology notes for more details on oncologic history and treatment course.       5/15/25  History of Present Illness    CHIEF COMPLAINT:  - Patient presents for a follow-up visit to discuss his improving health status and recent medical procedures.    HISTORY OBTAINED FROM:  - Patient  - Patient's spouse    HPI:  Patient reports significant improvement in his overall condition compared to his previous visit, noting increased appetite, better sleep, and improved stamina. He is now able to engage in more activities throughout the day, including walking around the house, watching TV, and occasionally washing dishes. He has resumed driving for the past 2-3 months, primarily for therapy sessions, doctor's appointments, and lab visits.    Patient recently underwent an EGD last week. Initial findings indicated stomach inflammation without any serious concerns. Biopsies were taken, and results are pending. The inflammation could potentially be attributed to his medications or a bacterial infection.    Regarding his oncology care, the patient's condition is reported as stable with all parameters within normal ranges, though not yet at ideal levels. His hemoglobin is currently at 10.2, and white blood cell count was last measured at 1.95, which is still considered low. Due to the low white blood cell count, the patient and his spouse continue to take precautions such as wearing masks and limiting social  interactions.    Patient's activity level has increased, but he remains relatively isolated at home, primarily interacting with family members. His spouse mentions attending a karaoke event, but the patient has declined to participate.    GOALS OF CARE/ADVANCED DIRECTIVES:  - Desires to travel to home country to handle important USP-related matters, pending oncologist's approval  - Patient and caregiver are cautious about exposure to crowds and maintain mask usage in public settings  - Family members, including siblings and their spouses, visit the patient at home  - Wife encourages patient to engage in social activities, such as attending a karaoke event with friends, to maintain quality of life while being mindful of health precautions  - Lives at home with wife, who assists with daily activities and medical appointments    ACTIVITIES OF DAILY LIVING:  - Reports improved appetite and sleep  - Able to walk around the house independently  - Drives to therapy, doctor's appointments, and lab visits (for the past 2-3 months)  - Occasionally washes dishes  - Watches TV, including news and soccer games  - Patient and wife maintain social isolation, primarily interacting with family members  - Attends family reunions and occasionally visits siblings' homes           Please see previous notes for full details of encounters on 08/12/2024; 10/23/2024; 12/11/2024; 02/27/2025    ROS:  Review of Systems   Constitutional:  Positive for activity change (improving) and fatigue (improving). Negative for appetite change.   HENT: Negative.     Eyes: Negative.    Respiratory: Negative.     Cardiovascular: Negative.    Gastrointestinal: Negative.  Negative for abdominal pain, constipation, diarrhea and nausea.   Genitourinary: Negative.    Musculoskeletal:  Positive for leg pain (stable).   Neurological:  Positive for weakness (left side; improving). Negative for syncope.   Psychiatric/Behavioral:  Positive for sleep  disturbance. Negative for dysphoric mood. The patient is not nervous/anxious.    All other systems reviewed and are negative.      Review of Symptoms      Symptom Assessment (ESAS 0-10 Scale)  Pain:  0  Dyspnea:  0  Anxiety:  0  Nausea:  0  Depression:  0  Anorexia:  0  Fatigue:  8  Insomnia:  0  Restlessness:  0  Agitation:  0     CAM / Delirium:  Negative  Constipation:  Negative  Diarrhea:  Negative    Anxiety:  Is not nervous/anxious  Constipation:  No constipation    Bowel Management Plan (BMP):  No      Pain Assessment:  OME in 24 hours:  0  Location(s): none      Modified Yisel Scale:  0    ECOG Performance Status ndGndrndanddndend:nd nd2nd Living Arrangements:  Lives with spouse    Psychosocial/Cultural:   See Palliative Psychosocial Note: Yes  Please see SW note from 08/12/2024 for full details.    Patient enjoys painting/drawing and gardening. He has three children with his wife  **Primary  to Follow**  Palliative Care  Consult: No    Spiritual:  F - [place] and Belief:  Evangelical  I - Importance:  Moderate  C - Community:  None  A - Address in Care:  Needs met        Medications:    Current Outpatient Medications:     acyclovir (ZOVIRAX) 800 MG Tab, Take 1 tablet (800 mg total) by mouth 2 (two) times daily., Disp: 60 tablet, Rfl: 11    atovaquone (MEPRON) 750 mg/5 mL Susp oral liquid, Take 10 mLs (1,500 mg total) by mouth once daily., Disp: 210 mL, Rfl: 2    budesonide (ENTOCORT EC) 3 mg capsule, Take 1 capsule (3 mg total) by mouth 3 (three) times daily., Disp: 90 capsule, Rfl: 2    carvediloL (COREG) 3.125 MG tablet, Take 1 tablet (3.125 mg total) by mouth 2 (two) times daily., Disp: 60 tablet, Rfl: 11    cyanocobalamin 1,000 mcg/mL injection, Inject 1 mL (1,000 mcg total) into the muscle once a week., Disp: 10 mL, Rfl: 2    eltrombopag olamine (PROMACTA) 50 MG Tab, Take 1 tablet (50 mg total) by mouth once daily., Disp: 30 tablet, Rfl: 11    folic acid (FOLVITE) 1 MG tablet, Take 1 tablet (1 mg  total) by mouth once daily., Disp: 100 tablet, Rfl: 3    gabapentin (NEURONTIN) 300 MG capsule, Take 2 capsules (600 mg total) by mouth nightly as needed (Restless leg)., Disp: 60 capsule, Rfl: 2    hydrocortisone 1 % cream, Apply to affected area 2 times daily, Disp: 30 g, Rfl: 1    letermovir (PREVYMIS) 480 mg Tab, Take 1 tablet (480 mg total) by mouth Daily., Disp: 28 tablet, Rfl: 9    levoFLOXacin (LEVAQUIN) 500 MG tablet, Take 1 tablet (500 mg total) by mouth once daily., Disp: 30 tablet, Rfl: 5    loperamide (IMODIUM A-D) 2 mg Tab, Take 2 mg by mouth 4 (four) times daily as needed., Disp: , Rfl:     magnesium oxide (MAG-OX) 400 mg (241.3 mg magnesium) tablet, Take 2 tablets (800 mg total) by mouth 2 (two) times daily., Disp: 120 tablet, Rfl: 11    ondansetron (ZOFRAN-ODT) 8 MG TbDL, Take 1 tablet (8 mg total) by mouth every 8 (eight) hours as needed (nausea)., Disp: 30 tablet, Rfl: 1    pantoprazole (PROTONIX) 40 MG tablet, Take 1 tablet (40 mg total) by mouth once daily., Disp: 30 tablet, Rfl: 3    posaconazole (NOXAFIL) 100 mg TbEC tablet, Take 3 tablets (300 mg total) by mouth once daily., Disp: 90 tablet, Rfl: 11    rOPINIRole (REQUIP) 0.5 MG tablet, Take 1 tablet (0.5 mg total) by mouth every evening., Disp: 30 tablet, Rfl: 11    tacrolimus (PROGRAF) 0.5 MG Cap, Take 1 capsule (0.5 mg total) by mouth every morning AND 1 capsule (0.5 mg total) every evening., Disp: 90 capsule, Rfl: 3    tamsulosin (FLOMAX) 0.4 mg Cap, Take 1 capsule (0.4 mg total) by mouth once daily., Disp: 30 capsule, Rfl: 11    UNABLE TO FIND, Take by mouth once daily. medication name: copper, Disp: , Rfl:     zolpidem (AMBIEN) 5 MG Tab, Take 1 tablet (5 mg total) by mouth nightly as needed (insomnia)., Disp: 30 tablet, Rfl: 0      External  database queried on 05/15/2025  by Elvira BARTLETT :  04/30/2025 Zolpidem tartrate 5 mg Disp: 30 for 30 days  04/28/2025 Gabapentin 300 mg Disp: 60 for 30 days       Review of  "patient's allergies indicates:  No Known Allergies        OBJECTIVE:         Physical Exam:  Vitals: Pulse: 90 (05/15/25 1050)  BP: (!) 107/58 (05/15/25 1050)  SpO2: 97 % (05/15/25 1050)    Physical Exam  Vitals reviewed.   Constitutional:       General: He is not in acute distress.     Appearance: He is not toxic-appearing or diaphoretic.   HENT:      Head: Normocephalic and atraumatic.      Right Ear: External ear normal.      Left Ear: External ear normal.   Eyes:      General: No scleral icterus.        Right eye: No discharge.         Left eye: No discharge.      Extraocular Movements: Extraocular movements intact.   Neck:      Comments: Trachea midline  Cardiovascular:      Rate and Rhythm: Normal rate.   Pulmonary:      Effort: Pulmonary effort is normal. No respiratory distress.   Abdominal:      General: Abdomen is flat. There is no distension.   Musculoskeletal:         General: No signs of injury.      Cervical back: Normal range of motion.      Right lower leg: No edema.      Left lower leg: No edema.   Skin:     General: Skin is dry.      Coloration: Skin is not jaundiced.      Findings: No rash.   Neurological:      General: No focal deficit present.      Mental Status: He is oriented to person, place, and time.      Cranial Nerves: No cranial nerve deficit.   Psychiatric:         Mood and Affect: Mood normal.         Behavior: Behavior normal.         Thought Content: Thought content normal.         Judgment: Judgment normal.           Labs: I have reviewed the latest labs on 05/15/2025 including CBC showing WBC: 1.95, Hg: 10.1, Hct: 29.8, platelets: 49 as well as CMP showing Cr: 1.0, Alb: 3.1      Imaging: I have reviewed the latest imaging on 12/27/2024 CT C/A/P: "New left lower lobe consolidation and parapneumonic effusion concerning for acute infectious process. Trace pericholecystic fluid without significant gallbladder wall thickening or gallstones.  Finding may be related to edema, however " "cholecystitis could present similarly.  Recommend correlation for cholecystitis."    This note was generated with the assistance of ambient listening technology. Verbal consent was obtained by the patient and accompanying visitor(s) for the recording of patient appointment to facilitate this note. I attest to having reviewed and edited the generated note for accuracy, though some syntax or spelling errors may persist. Please contact the author of this note for any clarification.    Elvira Maria MD            "

## 2025-05-19 ENCOUNTER — CLINICAL SUPPORT (OUTPATIENT)
Dept: REHABILITATION | Facility: HOSPITAL | Age: 70
End: 2025-05-19
Payer: MEDICARE

## 2025-05-19 DIAGNOSIS — Z74.09 DECREASED STRENGTH, ENDURANCE, AND MOBILITY: Primary | ICD-10-CM

## 2025-05-19 DIAGNOSIS — R53.1 DECREASED STRENGTH, ENDURANCE, AND MOBILITY: Primary | ICD-10-CM

## 2025-05-19 DIAGNOSIS — R68.89 DECREASED STRENGTH, ENDURANCE, AND MOBILITY: Primary | ICD-10-CM

## 2025-05-19 PROCEDURE — 97112 NEUROMUSCULAR REEDUCATION: CPT | Mod: PN

## 2025-05-19 PROCEDURE — 97530 THERAPEUTIC ACTIVITIES: CPT | Mod: PN

## 2025-05-19 NOTE — PROGRESS NOTES
"[facility]   Physical Therapy Treatment Note     Name: Guillaume Amezcuaona  Clinic Number: [id number]    Therapy Diagnosis:   Encounter Diagnosis   Name Primary?    Decreased strength, endurance, and mobility Yes       Physician: Mohit Hickey MD    Visit Date: 5/19/2025    Physician Orders: PT Eval and Treat   Medical Diagnosis from Referral: Z94.81 (ICD-10-CM) - S/P allogeneic bone marrow transplant   Evaluation Date: 12/20/2024  Authorization Period Expiration: 12/09/2025   Plan of Care Expiration: 7/17/25  Progress Note Due: 6/9/25  Date of Surgery: 9/19/14  Visit # / Visits authorized: 17/ 20 +eval  FOTO: 2/ 3     Precautions: Standard and HTN,Myelodysplastic syndrome, PVD, CAD, chemotherapy        Time In: 1300  Time Out: 1351  Total Billable Time: 1351 minutes    PTA Visit #: 0/5     Subjective     Patient reports: He is feeling good today without any new complaints.   He was not compliant with home exercise program.  Response to previous treatment: very good, a little tired  Functional change: ongoing    Pain: 0/10  Location: bilateral legs     Objective    5/9/2025    30 second sit to stand: 16 reps without use of hands  6 Minute walk test: 1391 ft     Treatment     Guillaume received the treatments listed below:      neuromuscular re-education activities to improve: Balance, Coordination, Proprioception, and Posture for 26 minutes. The following activities were included:  Cybex leg extension 15lb 3x10   Cybex leg press 4 plates 2x10   Standing hip abduction Green theraband 2x10 ea  Standing hip extension Green  theraband 2x10 ea   Standing shoulder extension green theraband 3x10   Rows green theraband 3x10   Standing T's green theraband 3x10     therapeutic activities to improve functional performance for 25 minutes, including:  Nustep x 8 minutes   Standing heel raises 3x10   Step ups 6" 2x10 ea  Lateral step up 6" x10 ea   Sit to stand from blue chair x10   5 minute " walk around clinic     NT:  Supine active range of motion shoulder flexion 3lb wand x30   Supine chest press 3lb wand 3x10  Seated Bicep curl 3lb 3x10 ea         Patient Education and Home Exercises       Education provided:   - updated Home exercise program     Written Home Exercises Provided: Pt instructed to continue prior HEP. Exercises were reviewed and Guillaume was able to demonstrate them prior to the end of the session.  Guillaume demonstrated good  understanding of the education provided. See Electronic Medical Record under Patient Instructions for exercises provided during therapy sessions    Assessment     Patient arrived to today's session without any new reports of pain or fatigue. He was easily fatigued during today's session and required periodic seated rest breaks. He had greatest fatigue with sit to stand and step ups today. He required verbal cues for slow and controlled movement with all exercises.   Guillaume Is progressing well towards his goals.   Patient prognosis is Fair.     Patient will continue to benefit from skilled outpatient physical therapy to address the deficits listed in the problem list box on initial evaluation, provide pt/family education and to maximize pt's level of independence in the home and community environment.     Patient's spiritual, cultural and educational needs considered and pt agreeable to plan of care and goals.     Anticipated barriers to physical therapy: co-morbidities    Goals: Short Term Goals: 6 weeks   Patient will be independent with Home exercise program to supplement therapy progressing, not met   Patient will be able to ambulate 1300 ft with 6 Minute walk test to improve endurance for community mobility  met  Patient will be able to lift and carry groceries without difficulty to improve ability to perform functional tasks  met     Long Term Goals: 12 weeks   Patient will be able to ambulate 1400 ft or more with 6 Minute walk test to improve endurance for  community mobility progressing, not met  Patient will be able to perform 16 sit to stand on 30 second sit to  order to improve functional strength and endurance for transfers met  Patient will improve FOTO score to 65 % to improve self perceived ability to perform functional tasks progressing, not met  Patient goal: Patient will be able to mow his lawn to return to prior level of function progressing, not met    Plan     Improve functional strength and endurance    Elvira Joyce, PT ,DPT,OCS    05/19/2025

## 2025-05-21 ENCOUNTER — TELEPHONE (OUTPATIENT)
Dept: HEMATOLOGY/ONCOLOGY | Facility: CLINIC | Age: 70
End: 2025-05-21
Payer: MEDICARE

## 2025-05-21 ENCOUNTER — PATIENT MESSAGE (OUTPATIENT)
Dept: HEMATOLOGY/ONCOLOGY | Facility: CLINIC | Age: 70
End: 2025-05-21
Payer: MEDICARE

## 2025-05-21 ENCOUNTER — LAB VISIT (OUTPATIENT)
Dept: LAB | Facility: HOSPITAL | Age: 70
End: 2025-05-21
Attending: INTERNAL MEDICINE
Payer: MEDICARE

## 2025-05-21 ENCOUNTER — TELEPHONE (OUTPATIENT)
Dept: INFUSION THERAPY | Facility: HOSPITAL | Age: 70
End: 2025-05-21
Payer: MEDICARE

## 2025-05-21 DIAGNOSIS — A09 INFECTIOUS DIARRHEA: ICD-10-CM

## 2025-05-21 DIAGNOSIS — Z76.82 STEM CELL TRANSPLANT CANDIDATE: ICD-10-CM

## 2025-05-21 DIAGNOSIS — D46.9 MYELODYSPLASTIC SYNDROME: ICD-10-CM

## 2025-05-21 DIAGNOSIS — D64.9 SYMPTOMATIC ANEMIA: ICD-10-CM

## 2025-05-21 LAB
ABSOLUTE NEUTROPHIL MANUAL (OHS): 1.2 K/UL
ALBUMIN SERPL BCP-MCNC: 3.5 G/DL (ref 3.5–5.2)
ALP SERPL-CCNC: 72 UNIT/L (ref 40–150)
ALT SERPL W/O P-5'-P-CCNC: 15 UNIT/L (ref 10–44)
ANION GAP (OHS): 6 MMOL/L (ref 8–16)
ANISOCYTOSIS BLD QL SMEAR: SLIGHT
AST SERPL-CCNC: 17 UNIT/L (ref 11–45)
BASOPHILS NFR BLD MANUAL: 1 %
BILIRUB SERPL-MCNC: 0.4 MG/DL (ref 0.1–1)
BUN SERPL-MCNC: 24 MG/DL (ref 8–23)
CALCIUM SERPL-MCNC: 9.1 MG/DL (ref 8.7–10.5)
CHLORIDE SERPL-SCNC: 107 MMOL/L (ref 95–110)
CO2 SERPL-SCNC: 26 MMOL/L (ref 23–29)
CREAT SERPL-MCNC: 1.5 MG/DL (ref 0.5–1.4)
EOSINOPHIL NFR BLD MANUAL: 2 % (ref 0–8)
ERYTHROCYTE [DISTWIDTH] IN BLOOD BY AUTOMATED COUNT: 14.6 % (ref 11.5–14.5)
GFR SERPLBLD CREATININE-BSD FMLA CKD-EPI: 50 ML/MIN/1.73/M2
GLUCOSE SERPL-MCNC: 93 MG/DL (ref 70–110)
HCT VFR BLD AUTO: 30.5 % (ref 40–54)
HGB BLD-MCNC: 10.1 GM/DL (ref 14–18)
INDIRECT COOMBS: NORMAL
LDH SERPL-CCNC: 242 U/L (ref 110–260)
LYMPHOCYTES NFR BLD MANUAL: 15 % (ref 18–48)
MAGNESIUM SERPL-MCNC: 1.8 MG/DL (ref 1.6–2.6)
MCH RBC QN AUTO: 39 PG (ref 27–31)
MCHC RBC AUTO-ENTMCNC: 33.1 G/DL (ref 32–36)
MCV RBC AUTO: 118 FL (ref 82–98)
MONOCYTES NFR BLD MANUAL: 14 % (ref 4–15)
NEUTROPHILS NFR BLD MANUAL: 65 % (ref 38–73)
NEUTS BAND NFR BLD MANUAL: 3 %
NUCLEATED RBC (/100WBC) (OHS): 0 /100 WBC
PHOSPHATE SERPL-MCNC: 3.5 MG/DL (ref 2.7–4.5)
PLATELET # BLD AUTO: 44 K/UL (ref 150–450)
PLATELET BLD QL SMEAR: ABNORMAL
PMV BLD AUTO: 12.1 FL (ref 9.2–12.9)
POTASSIUM SERPL-SCNC: 5 MMOL/L (ref 3.5–5.1)
PROT SERPL-MCNC: 5.9 GM/DL (ref 6–8.4)
RBC # BLD AUTO: 2.59 M/UL (ref 4.6–6.2)
RH BLD: NORMAL
SODIUM SERPL-SCNC: 139 MMOL/L (ref 136–145)
SPECIMEN OUTDATE: NORMAL
URATE SERPL-MCNC: 5 MG/DL (ref 3.4–7)
WBC # BLD AUTO: 1.7 K/UL (ref 3.9–12.7)

## 2025-05-21 PROCEDURE — 84100 ASSAY OF PHOSPHORUS: CPT

## 2025-05-21 PROCEDURE — 86900 BLOOD TYPING SEROLOGIC ABO: CPT | Performed by: INTERNAL MEDICINE

## 2025-05-21 PROCEDURE — 84550 ASSAY OF BLOOD/URIC ACID: CPT

## 2025-05-21 PROCEDURE — 80053 COMPREHEN METABOLIC PANEL: CPT

## 2025-05-21 PROCEDURE — 85025 COMPLETE CBC W/AUTO DIFF WBC: CPT

## 2025-05-21 PROCEDURE — 87799 DETECT AGENT NOS DNA QUANT: CPT

## 2025-05-21 PROCEDURE — 83735 ASSAY OF MAGNESIUM: CPT

## 2025-05-21 PROCEDURE — 83615 LACTATE (LD) (LDH) ENZYME: CPT

## 2025-05-21 PROCEDURE — 80197 ASSAY OF TACROLIMUS: CPT

## 2025-05-21 PROCEDURE — 36415 COLL VENOUS BLD VENIPUNCTURE: CPT

## 2025-05-21 RX ORDER — SODIUM CHLORIDE 0.9 % (FLUSH) 0.9 %
10 SYRINGE (ML) INJECTION
OUTPATIENT
Start: 2025-05-23

## 2025-05-21 RX ORDER — HEPARIN 100 UNIT/ML
500 SYRINGE INTRAVENOUS
OUTPATIENT
Start: 2025-05-23

## 2025-05-22 ENCOUNTER — RESULTS FOLLOW-UP (OUTPATIENT)
Dept: HEMATOLOGY/ONCOLOGY | Facility: CLINIC | Age: 70
End: 2025-05-22

## 2025-05-22 ENCOUNTER — INFUSION (OUTPATIENT)
Dept: INFUSION THERAPY | Facility: HOSPITAL | Age: 70
End: 2025-05-22
Attending: INTERNAL MEDICINE
Payer: MEDICARE

## 2025-05-22 ENCOUNTER — PATIENT MESSAGE (OUTPATIENT)
Dept: HEMATOLOGY/ONCOLOGY | Facility: CLINIC | Age: 70
End: 2025-05-22

## 2025-05-22 ENCOUNTER — OFFICE VISIT (OUTPATIENT)
Dept: HEMATOLOGY/ONCOLOGY | Facility: CLINIC | Age: 70
End: 2025-05-22
Payer: MEDICARE

## 2025-05-22 VITALS
RESPIRATION RATE: 18 BRPM | HEART RATE: 81 BPM | TEMPERATURE: 97 F | HEIGHT: 68 IN | BODY MASS INDEX: 21.72 KG/M2 | OXYGEN SATURATION: 99 % | DIASTOLIC BLOOD PRESSURE: 68 MMHG | WEIGHT: 143.31 LBS | SYSTOLIC BLOOD PRESSURE: 113 MMHG

## 2025-05-22 VITALS
OXYGEN SATURATION: 97 % | SYSTOLIC BLOOD PRESSURE: 114 MMHG | HEART RATE: 90 BPM | RESPIRATION RATE: 16 BRPM | TEMPERATURE: 99 F | DIASTOLIC BLOOD PRESSURE: 73 MMHG

## 2025-05-22 DIAGNOSIS — F41.9 ANXIETY: ICD-10-CM

## 2025-05-22 DIAGNOSIS — Z94.84 IMMUNOCOMPROMISED STATE ASSOCIATED WITH STEM CELL TRANSPLANT: ICD-10-CM

## 2025-05-22 DIAGNOSIS — D89.810 ACUTE GVHD: ICD-10-CM

## 2025-05-22 DIAGNOSIS — E61.0 COPPER DEFICIENCY: ICD-10-CM

## 2025-05-22 DIAGNOSIS — D84.822 IMMUNOCOMPROMISED STATE ASSOCIATED WITH STEM CELL TRANSPLANT: ICD-10-CM

## 2025-05-22 DIAGNOSIS — D61.818 PANCYTOPENIA: ICD-10-CM

## 2025-05-22 DIAGNOSIS — E53.8 FOLIC ACID DEFICIENCY: ICD-10-CM

## 2025-05-22 DIAGNOSIS — Z94.81 S/P ALLOGENEIC BONE MARROW TRANSPLANT: Primary | ICD-10-CM

## 2025-05-22 DIAGNOSIS — G25.81 RESTLESS LEG SYNDROME: ICD-10-CM

## 2025-05-22 DIAGNOSIS — E53.8 B12 DEFICIENCY: ICD-10-CM

## 2025-05-22 DIAGNOSIS — N17.9 AKI (ACUTE KIDNEY INJURY): Primary | ICD-10-CM

## 2025-05-22 DIAGNOSIS — D46.9 MYELODYSPLASTIC SYNDROME: ICD-10-CM

## 2025-05-22 LAB
CYTOMEGALOVIRUS DNA, QUAL (OHS): NOT DETECTED
EPSTEIN-BARR VIRUS DNA, QUAL (OHS): NORMAL
TACROLIMUS BLD-MCNC: 9.8 NG/ML (ref 5–15)

## 2025-05-22 PROCEDURE — 99999 PR PBB SHADOW E&M-EST. PATIENT-LVL IV: CPT | Mod: PBBFAC,,,

## 2025-05-22 PROCEDURE — A4216 STERILE WATER/SALINE, 10 ML: HCPCS | Performed by: INTERNAL MEDICINE

## 2025-05-22 PROCEDURE — 25000003 PHARM REV CODE 250

## 2025-05-22 PROCEDURE — 96360 HYDRATION IV INFUSION INIT: CPT

## 2025-05-22 PROCEDURE — 25000003 PHARM REV CODE 250: Performed by: INTERNAL MEDICINE

## 2025-05-22 RX ORDER — PREDNISONE 10 MG/1
30 TABLET ORAL DAILY
Qty: 90 TABLET | Refills: 0 | Status: SHIPPED | OUTPATIENT
Start: 2025-05-22 | End: 2025-06-21

## 2025-05-22 RX ORDER — SODIUM CHLORIDE 0.9 % (FLUSH) 0.9 %
10 SYRINGE (ML) INJECTION
Status: DISCONTINUED | OUTPATIENT
Start: 2025-05-22 | End: 2025-05-22 | Stop reason: HOSPADM

## 2025-05-22 RX ADMIN — SODIUM CHLORIDE 1000 ML: 9 INJECTION, SOLUTION INTRAVENOUS at 08:05

## 2025-05-22 RX ADMIN — Medication 10 ML: at 08:05

## 2025-05-22 NOTE — PROGRESS NOTES
Section of Hematology and Stem Cell Transplantation    Post-Transplantation Follow Up Visit       Notes:    05/22/2025    Transplant History:   Primary oncologist: Paco Hickey MD  Primary oncologic diagnosis: MDS  Transplant date: 9/19/2024  Donor: haploidentical  Blood Type (Patient): B +  Blood Type (Donor): A +  CMV (Patient): Positive  CMV (Donor): Positive  Graft source: Bone marrow  CD34+ cell dose: 3.55x10^6  Conditioning Regimen: Fludarabine plus melphalan 100 mg/m2 + 2Gy TBI  GVHD prophylaxis: Post-transplant cyclophosphamide, Tacrolimus, MMF  Immediate post-transplant complications: Patient experienced expected GI toxicities with C diff negative diarrhea and nausea, neutropenic fever with negative infectious work up, expected cytopenias requiring transfusions, electrolyte abnormalities requiring replacement, volume overload requiring diuresis, intermittent SOB from pulmonary edema requiring 02, and a hemorrhoid flare up. Diarrhea and SOB improved towards the end of the hospital stay.     History of Present Ilness:   Guillaume Salinas (Guillaume) is a pleasant 69 y.o.male with a past medical history of MDS who is status post haploidentical stem cell transplantation conditioned with FluMel 100 + PTCy+ 2Gy TBI who is currently day +245  who presents for post-transplant follow up. His daughter translated today, they denied our  services.     Interval History:   Patient presents for routine follow-up post allogeneic transplant. He is doing very good.  He has minor nausea since starting budesonide taper and he does take the zofran in the morning to help it. EGD done 5/8/25  with biopsy showing gastric antral and oxyntic-type mucosa with scattered epithelial apoptoses, glandular abscesses, and scattered gland destruction consistent with [id number]-aq-[id number] disease (GVHD), Grade 2 . Physical therapy has been going well. He is eating well. He had two episodes of loose stool  the other day but has not  had any since then.     PAST MEDICAL HISTORY/:   Past Medical History:   Diagnosis Date    Anticoagulant long-term use     Coronary artery disease     Hypertension     Myelodysplastic syndrome     Peripheral vascular disease, unspecified        PAST SURGICAL HISTORY:   Past Surgical History:   Procedure Laterality Date    BONE MARROW BIOPSY Left 2023    Procedure: Biopsy-bone marrow;  Surgeon: Harry Diamond MD;  Location: [facility] OR;  Service: Oncology;  Laterality: Left;    COLONOSCOPY N/A 2022    Procedure: COLONOSCOPY Golytely Vaccinated will bring cards;  Surgeon: Dereje Simon MD;  Location: [facility];  Service: Endoscopy;  Laterality: N/A;  Do not cancel this order    ESOPHAGOGASTRODUODENOSCOPY N/A 2025    Procedure: EGD (ESOPHAGOGASTRODUODENOSCOPY);  Surgeon: Dudley Toth MD;  Location: [facility] ENDO;  Service: Endoscopy;  Laterality: N/A;  ref by Gilma Ugalde, PA-C,portal-ae    INSERTION OF LEMONS CATHETER Right 2024    Procedure: INSERTION, CATHETER, CENTRAL VENOUS, LEMONS TRIPLE LUMEN;  Surgeon: Kg Patten MD;  Location: [address];  Service: General;  Laterality: Right;     PAST SOCIAL HISTORY:  Social History     Socioeconomic History    Marital status:    Tobacco Use    Smoking status: Former     Current packs/day: 0.00     Average packs/day: 0.3 packs/day for 50.0 years (12.5 ttl pk-yrs)     Types: Cigarettes     Start date: 3/1/1973     Quit date: 3/1/2023     Years since quittin.2     Passive exposure: Past    Smokeless tobacco: Never   Substance and Sexual Activity    Alcohol use: Not Currently    Drug use: Never    Sexual activity: Not Currently     Partners: Female     Social Drivers of Health     Financial Resource Strain: Patient Declined (2024)    Overall Financial Resource Strain (CARDIA)     Difficulty of Paying Living Expenses: Patient declined   Food Insecurity: Patient Declined (2024)    Hunger Vital Sign      Worried About Running Out of Food in the Last Year: Patient declined     Ran Out of Food in the Last Year: Patient declined   Transportation Needs: Patient Declined (12/28/2024)    TRANSPORTATION NEEDS     Transportation : Patient declined   Physical Activity: Inactive (1/10/2025)    Exercise Vital Sign     Days of Exercise per Week: 0 days     Minutes of Exercise per Session: 10 min   Stress: Patient Declined (12/28/2024)    [employer] - Occupational Stress Questionnaire     Feeling of Stress : Patient declined   Recent Concern: Stress - Stress Concern Present (9/30/2024)    [employer] - Occupational Stress Questionnaire     Feeling of Stress : To some extent   Housing Stability: Patient Declined (12/28/2024)    Housing Stability Vital Sign     Unable to Pay for Housing in the Last Year: Patient declined     Homeless in the Last Year: Patient declined       FAMILY HISTORY:  Cancer-related family history includes Cancer in his brother and father.    CURRENT MEDICATIONS:   Medication List with Changes/Refills   Current Medications    ACYCLOVIR (ZOVIRAX) 800 MG TAB    Take 1 tablet (800 mg total) by mouth 2 (two) times daily.    ATOVAQUONE (MEPRON) 750 MG/5 ML SUSP ORAL LIQUID    Take 10 mLs (1,500 mg total) by mouth once daily.    BUDESONIDE (ENTOCORT EC) 3 MG CAPSULE    Take 1 capsule (3 mg total) by mouth 3 (three) times daily.    CARVEDILOL (COREG) 3.125 MG TABLET    Take 1 tablet (3.125 mg total) by mouth 2 (two) times daily.    CYANOCOBALAMIN 1,000 MCG/ML INJECTION    Inject 1 mL (1,000 mcg total) into the muscle once a week.    ELTROMBOPAG OLAMINE (PROMACTA) 50 MG TAB    Take 1 tablet (50 mg total) by mouth once daily.    FOLIC ACID (FOLVITE) 1 MG TABLET    Take 1 tablet (1 mg total) by mouth once daily.    GABAPENTIN (NEURONTIN) 300 MG CAPSULE    Take 2 capsules (600 mg total) by mouth nightly as needed (Restless leg).    HYDROCORTISONE 1 % CREAM     Apply to affected area 2 times daily    LETERMOVIR (PREVYMIS) 480 MG TAB    Take 1 tablet (480 mg total) by mouth Daily.    LEVOFLOXACIN (LEVAQUIN) 500 MG TABLET    Take 1 tablet (500 mg total) by mouth once daily.    LOPERAMIDE (IMODIUM A-D) 2 MG TAB    Take 2 mg by mouth 4 (four) times daily as needed.    MAGNESIUM OXIDE (MAG-OX) 400 MG (241.3 MG MAGNESIUM) TABLET    Take 2 tablets (800 mg total) by mouth 2 (two) times daily.    ONDANSETRON (ZOFRAN-ODT) 8 MG TBDL    Take 1 tablet (8 mg total) by mouth every 8 (eight) hours as needed (nausea).    PANTOPRAZOLE (PROTONIX) 40 MG TABLET    Take 1 tablet (40 mg total) by mouth once daily.    POSACONAZOLE (NOXAFIL) 100 MG TBEC TABLET    Take 3 tablets (300 mg total) by mouth once daily.    ROPINIROLE (REQUIP) 0.5 MG TABLET    Take 1 tablet (0.5 mg total) by mouth every evening.    TACROLIMUS (PROGRAF) 0.5 MG CAP    Take 1 capsule (0.5 mg total) by mouth every morning AND 1 capsule (0.5 mg total) every evening.    TAMSULOSIN (FLOMAX) 0.4 MG CAP    Take 1 capsule (0.4 mg total) by mouth once daily.    UNABLE TO FIND    Take by mouth once daily. medication name: copper    ZOLPIDEM (AMBIEN) 5 MG TAB    Take 1 tablet (5 mg total) by mouth nightly as needed (insomnia).       ALLERGIES:   Review of patient's allergies indicates:  No Known Allergies    GVHD Review of Systems:     Pertinent positives and negatives included in the HPI. Otherwise a 14 point review of systems is negative. GVHD review of systems recorded in BMT flowsheet.     Physical Exam:     Vitals:    05/22/25 1325   BP: 113/68   Pulse: 81   Resp: 18   Temp: 97.4 °F (36.3 °C)         General: Appears well, NAD.   HEENT: MMM, no OP lesions  Pulmonary: CTAB, no increased work of breathing, no W/R/C  Cardiovascular: S1S2 normal, RRR, no M/R/G  Abdominal: Soft, NT, ND, BS+, no HSM  Extremities: No C/C/E  Neurological: AAOx4, grossly normal, no focal deficits  Dermatologic: No rashes or lesions     ECOG Performance  Status: (foot note - ECOG PS provided by [employer]cology [employer]) 1 - Symptomatic but completely ambulatory    Karnofsky Performance Score:  80%- Normal Activity with Effort: Some Symptoms of Disease    Labs:   Lab Results   Component Value Date    WBC 1.70 (LL) 05/21/2025    HGB 10.1 (L) 05/21/2025    HCT 30.5 (L) 05/21/2025     (H) 05/21/2025    PLT 44 (L) 05/21/2025        CMP  Sodium   Date Value Ref Range Status   05/21/2025 139 136 - 145 mmol/L Final   03/17/2025 140 136 - 145 mmol/L Final     Potassium   Date Value Ref Range Status   05/21/2025 5.0 3.5 - 5.1 mmol/L Final   03/17/2025 4.9 3.5 - 5.1 mmol/L Final     Chloride   Date Value Ref Range Status   05/21/2025 107 95 - 110 mmol/L Final   03/17/2025 105 95 - 110 mmol/L Final     CO2   Date Value Ref Range Status   05/21/2025 26 23 - 29 mmol/L Final   03/17/2025 26 23 - 29 mmol/L Final     Glucose   Date Value Ref Range Status   05/21/2025 93 70 - 110 mg/dL Final   03/17/2025 109 70 - 110 mg/dL Final     BUN   Date Value Ref Range Status   05/21/2025 24 (H) 8 - 23 mg/dL Final     Creatinine   Date Value Ref Range Status   05/21/2025 1.5 (H) 0.5 - 1.4 mg/dL Final     Calcium   Date Value Ref Range Status   05/21/2025 9.1 8.7 - 10.5 mg/dL Final   03/17/2025 9.3 8.7 - 10.5 mg/dL Final     Protein Total   Date Value Ref Range Status   05/21/2025 5.9 (L) 6.0 - 8.4 gm/dL Final     Total Protein   Date Value Ref Range Status   03/17/2025 6.1 6.0 - 8.4 g/dL Final     Albumin   Date Value Ref Range Status   05/21/2025 3.5 3.5 - 5.2 g/dL Final   03/17/2025 3.5 3.5 - 5.2 g/dL Final     Total Bilirubin   Date Value Ref Range Status   03/17/2025 0.5 0.1 - 1.0 mg/dL Final     Comment:     For infants and newborns, interpretation of results should be based  on gestational age, weight and in agreement with clinical  observations.    Premature Infant recommended reference ranges:  Up to 24 hours.............<8.0 mg/dL  Up to 48 hours............<12.0  mg/dL  3-5 days..................<15.0 mg/dL  6-29 days.................<15.0 mg/dL       Bilirubin Total   Date Value Ref Range Status   05/21/2025 0.4 0.1 - 1.0 mg/dL Final     Comment:     For infants and newborns, interpretation of results should be based   on gestational age, weight and in agreement with clinical   observations.    Premature Infant recommended reference ranges:   0-24 hours:  <8.0 mg/dL   24-48 hours: <12.0 mg/dL   3-5 days:    <15.0 mg/dL   6-29 days:   <15.0 mg/dL     Alkaline Phosphatase   Date Value Ref Range Status   03/17/2025 66 40 - 150 U/L Final     ALP   Date Value Ref Range Status   05/21/2025 72 40 - 150 unit/L Final     AST   Date Value Ref Range Status   05/21/2025 17 11 - 45 unit/L Final   03/17/2025 31 10 - 40 U/L Final     ALT   Date Value Ref Range Status   05/21/2025 15 10 - 44 unit/L Final   03/17/2025 35 10 - 44 U/L Final     Anion Gap   Date Value Ref Range Status   05/21/2025 6 (L) 8 - 16 mmol/L Final     eGFR   Date Value Ref Range Status   05/21/2025 50 (L) >60 mL/min/1.73/m2 Final     Comment:     Estimated GFR calculated using the CKD-EPI creatinine (2021) equation.   03/17/2025 >60 >60 mL/min/1.73 m^2 Final          Imaging:   Hospital imaging reviewed.    Pathology:  Prior pathology reviewed.     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    MDS: Status post treatment with azacitidine 75 mg/m2 daily x7 days plus venetoclax 100mg (voriconazole) daily x14 of 28 days.Venetoclax decreased to 7 days with cycle 3 until completion of 11 cycles. No plans for maintenance at this time.     Status post allogeneic stem cell transplantation: As noted above, status post haploidentical stem cell transplantation conditioned with FluMel 100 + PTCy+ 2Gy TBI .  Currently Day+ 245. Engrafted on 10/09/24 day +20.  Day 30 bone marrow (11/5/2024) showing mildly hypercellular marrow with no increased blast (0.3%) and no evidence of myeloid neoplasm. Pending chimerisms, NGS, and CG  Day 100 bone marrow biopsy on 12/31/24 showed 30-40% cellularity, erythroid hyperplasia, dyserythropoiesis, decreased megakaryocytes with atypical morphology. No hemophagocytosis mentioned. NGS, FISH, and CG normal. 100% chimerisms.     3.    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 He started budesonide 3mg TID on 10/28/24. Due to persistent nausea despite budesonide so started systemic steroids 11/1 at 0.5 mg/kg and his steroid taper has been given to him. Steroid taper completed 12/8/24. No evidence of aGVHD today. 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 Taper completed. Patient's daughter sent messages through the portal about his nausea coming back with no improvement from zofran so budesonide 3mg TID was sent in on 2/6/25. Budesonide taper ended 3/13/25. Can consider tapering off tacrolimus in next few weeks if GVHD does not return. Skin gvhd present on face with erythema, inflammation, and sand paper feel on 3/14/25. Rash now improved on 4/7/25 with now only have some erythema likely from skin irration from hydrocortisone cream- cream stopped today. 4/23/25 Patient reports persistent nausea and appetite suppression; Budesonide 3mg TID started again; he has an EGD on 5/8/25. Start tapering budesonide 5/9/25. 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 Due to nausea still being present he will increase budesonide back up to TID and adding 0.5 mg/kg of prednisone (30 mg).  A. Current tacro dose: 0.5 mg BID  B. Last tacro level: 9.8  C.  Adjustments: N/a      4.     Immunosuppression: Prevention with posaconazole, acyclovir. CMV prophylaxis with letermovir. PJP prophyalxis atovaquone. Continue weekly monitoring of CMV and EBV.  Last CMV: Not-detected,   last EBV: negative  Active infections: N/A    Pancytopenia: Due to underlying disease and chemotherapy. Transfuse for Hgb <7 g/dL and platelets <10k. Folate and copper deficient, on supplementation. Will continue to monitor to see if platelets begin to rise with his supplements. Promacta sent in on 12/9/24. Change TMP/SMX to atovaquone. Continue promacta and supplements listed below (if platelets not budging or stay lower can increase to 100mg from 50mg). New count drops could be associated with GVHD.  Folic Acid deficiency: Continue folic acid 1mg daily  Copper deficiency: Copper level of 635, continue copper gluconate 2mg daily   B12 deficiency: Patient will get his next b12 injection monthly. He received one today in clinic. Next one around 6/7/25    Post transplant lymphoproliferative disorder: Patient received rituxan on 12/30/24 due to possible PTLD with elevated EBV and possible HLH. Now EBV has improved.   EBV negative     Hypomagnesemia: Continue MagOx to 800mg twice daily.       Nausea: Resolved with steroids and the taper originally ended 12/8/24. He ended up having nausea again while hospitalized and was again started on steroid and taper ended 1/28/25. Patient's daughter sent messages through the portal about his nausea coming back with no improvement from zofran so budesonide 3mg TID was sent in on 2/6/25. Budesonide taper ended 3/13/25.  Message sent through portal on 4/17/25 by his daughter about him having nausea again. Advised EGD to determine if it is GVHD due to nausea being intermittent many times. EGD scheduled for 5/8/25. EGD biopsy showed grade two acute GVHD. Due to nausea still being present he will increase budesonide back up to TID and adding 0.5 mg/kg of prednisone (30  mg).     Anxiety, Restless Leg Syndrome: Noted since discharge by family. He started ropinirole 0.5mg and has experienced significant improvement.  Symptoms have resolved. Continue Ropinirole    Insomnia: Patient now sleeping well and only waking up to urinate at night. Continue Ambien and Ropinirole for RLS.      Blood pressure abnormalities: For awhile patient had been holding off of his carvedilol due to hypotensions and dizziness. His pressures at home have been averaging 170s/90s so he started the carvedilol 6.25mg BID again. He began to complain of orthostatic hypotension symptoms when he takes the medication and BP in clinic is 105/55. His carvedilol was lowered to 3.125 daily. Today his blood pressure is normotensive. For now continue carvedilol 3.125mg BID and will monitor and make adjustments as needed.    12. Benign prostate hyperplasia : Patient has been experiencing urinary frequency for many weeks now but denies dysuria and hematuria. Urinalysis  was negative.  He saw urology 5/5/25 and was started on flomax for BPH.    Orders Placed:             Follow Up: Every other week with weekly labs       BMT Chart Routing      Follow up with physician    Follow up with [facility] . 4:20 on 5/28 with Gilma   Provider visit type    Infusion scheduling note    Injection scheduling note    Labs   Scheduling:  Preferred lab:  Lab interval:  Already scheduled   Imaging    Pharmacy appointment    Other referrals                     A total of 40 minutes was spent in pre-visit chart review, personal interpretation of labs and imaging, and medication review. Total visit time 35 minutes, >50 % counseling.     Visit today included increased complexity associated with the care of the episodic problem gut GVHD addressed and managing the longitudinal care of the patient due to the serious and/or complex managed problem(s) s/p allogeneic stem cell transplant.       Gilma Ugalde PA-C  Malignant Hematology, Stem Cell Transplant,  and Cellular Therapy  [facility]  [facility] [place]

## 2025-05-22 NOTE — PROGRESS NOTES
BMT Pharmacist Immunosuppression Note:    Reviewed patient's tacrolimus level with Dr. Hickey and it is within goal range.      Current regimen: 0.5 mg BID  Capsule size(s): 0.5 mg     Plan: Continue current regimen. Receiving fluids for ADDISON.         Lab Results   Component Value Date    TACROLIMUS 9.8 05/21/2025    TACROLIMUS 7.6 05/14/2025    TACROLIMUS 2.7 (L) 05/07/2025       Creatinine   Date Value Ref Range Status   05/21/2025 1.5 (H) 0.5 - 1.4 mg/dL Final   05/14/2025 1.0 0.5 - 1.4 mg/dL Final   05/07/2025 0.9 0.5 - 1.4 mg/dL Final     Total Bilirubin   Date Value Ref Range Status   03/17/2025 0.5 0.1 - 1.0 mg/dL Final     Comment:     For infants and newborns, interpretation of results should be based  on gestational age, weight and in agreement with clinical  observations.    Premature Infant recommended reference ranges:  Up to 24 hours.............<8.0 mg/dL  Up to 48 hours............<12.0 mg/dL  3-5 days..................<15.0 mg/dL  6-29 days.................<15.0 mg/dL     03/10/2025 0.4 0.1 - 1.0 mg/dL Final     Comment:     For infants and newborns, interpretation of results should be based  on gestational age, weight and in agreement with clinical  observations.    Premature Infant recommended reference ranges:  Up to 24 hours.............<8.0 mg/dL  Up to 48 hours............<12.0 mg/dL  3-5 days..................<15.0 mg/dL  6-29 days.................<15.0 mg/dL     03/03/2025 0.5 0.1 - 1.0 mg/dL Final     Comment:     For infants and newborns, interpretation of results should be based  on gestational age, weight and in agreement with clinical  observations.    Premature Infant recommended reference ranges:  Up to 24 hours.............<8.0 mg/dL  Up to 48 hours............<12.0 mg/dL  3-5 days..................<15.0 mg/dL  6-29 days.................<15.0 mg/dL       Bilirubin Total   Date Value Ref Range Status   05/21/2025 0.4 0.1 - 1.0 mg/dL Final     Comment:     For infants and newborns,  interpretation of results should be based   on gestational age, weight and in agreement with clinical   observations.    Premature Infant recommended reference ranges:   0-24 hours:  <8.0 mg/dL   24-48 hours: <12.0 mg/dL   3-5 days:    <15.0 mg/dL   6-29 days:   <15.0 mg/dL   05/14/2025 0.5 0.1 - 1.0 mg/dL Final     Comment:     For infants and newborns, interpretation of results should be based   on gestational age, weight and in agreement with clinical   observations.    Premature Infant recommended reference ranges:   0-24 hours:  <8.0 mg/dL   24-48 hours: <12.0 mg/dL   3-5 days:    <15.0 mg/dL   6-29 days:   <15.0 mg/dL   05/07/2025 0.6 0.1 - 1.0 mg/dL Final     Comment:     For infants and newborns, interpretation of results should be based   on gestational age, weight and in agreement with clinical   observations.    Premature Infant recommended reference ranges:   0-24 hours:  <8.0 mg/dL   24-48 hours: <12.0 mg/dL   3-5 days:    <15.0 mg/dL   6-29 days:   <15.0 mg/dL     AST   Date Value Ref Range Status   05/21/2025 17 11 - 45 unit/L Final   05/14/2025 18 11 - 45 unit/L Final   05/07/2025 22 11 - 45 unit/L Final   03/17/2025 31 10 - 40 U/L Final   03/10/2025 23 10 - 40 U/L Final   03/03/2025 26 10 - 40 U/L Final     ALT   Date Value Ref Range Status   05/21/2025 15 10 - 44 unit/L Final   05/14/2025 18 10 - 44 unit/L Final   05/07/2025 34 10 - 44 unit/L Final   03/17/2025 35 10 - 44 U/L Final   03/10/2025 31 10 - 44 U/L Final   03/03/2025 36 10 - 44 U/L Final             Cristofer Kruse, PharmD  [id number]

## 2025-05-23 ENCOUNTER — CLINICAL SUPPORT (OUTPATIENT)
Dept: REHABILITATION | Facility: HOSPITAL | Age: 70
End: 2025-05-23
Payer: MEDICARE

## 2025-05-23 DIAGNOSIS — Z74.09 DECREASED STRENGTH, ENDURANCE, AND MOBILITY: Primary | ICD-10-CM

## 2025-05-23 DIAGNOSIS — R53.1 DECREASED STRENGTH, ENDURANCE, AND MOBILITY: Primary | ICD-10-CM

## 2025-05-23 DIAGNOSIS — R68.89 DECREASED STRENGTH, ENDURANCE, AND MOBILITY: Primary | ICD-10-CM

## 2025-05-23 PROCEDURE — 97530 THERAPEUTIC ACTIVITIES: CPT | Mod: PN

## 2025-05-23 PROCEDURE — 97112 NEUROMUSCULAR REEDUCATION: CPT | Mod: PN

## 2025-05-23 NOTE — PROGRESS NOTES
"[facility]   Physical Therapy Treatment Note     Name: Guillaume Salinas  Clinic Number: [id number]    Therapy Diagnosis:   Encounter Diagnosis   Name Primary?    Decreased strength, endurance, and mobility Yes       Physician: Mohit Hickey MD    Visit Date: 5/23/2025    Physician Orders: PT Eval and Treat   Medical Diagnosis from Referral: Z94.81 (ICD-10-CM) - S/P allogeneic bone marrow transplant   Evaluation Date: 12/20/2024  Authorization Period Expiration: 12/09/2025   Plan of Care Expiration: 7/17/25  Progress Note Due: 6/9/25  Date of Surgery: 9/19/14  Visit # / Visits authorized: 17/ 20 +eval  FOTO: 2/ 3     Precautions: Standard and HTN,Myelodysplastic syndrome, PVD, CAD, chemotherapy        Time In: 1300  Time Out: 1345  Total Billable Time: 45 minutes    PTA Visit #: 0/5     Subjective     Patient reports: He is feeling good today.   He was not compliant with home exercise program.  Response to previous treatment: very good, a little tired  Functional change: ongoing    Pain: 0/10  Location: bilateral legs     Objective    5/9/2025    30 second sit to stand: 16 reps without use of hands  6 Minute walk test: 1391 ft     Treatment     Guillaume received the treatments listed below:      neuromuscular re-education activities to improve: Balance, Coordination, Proprioception, and Posture for 25 minutes. The following activities were included:  Cybex leg extension 15lb 3x10   Cybex leg press 3 plates 2x10   Standing hip abduction Green theraband 2x10 ea  Standing hip extension Green  theraband 2x10 ea   Standing shoulder extension green theraband 3x10   Rows green theraband 3x10   Standing T's green theraband 3x10     therapeutic activities to improve functional performance for 20 minutes, including:  Nustep x 8 minutes   Standing heel raises 3x10   Step ups 6" 2x10 ea  Lateral step up 6" x10 ea   Sit to stand from blue chair x10   5 minute walk around clinic " NT    NT:  Supine active range of motion shoulder flexion 3lb wand x30   Supine chest press 3lb wand 3x10  Seated Bicep curl 3lb 3x10 ea         Patient Education and Home Exercises       Education provided:   - updated Home exercise program     Written Home Exercises Provided: Pt instructed to continue prior HEP. Exercises were reviewed and Guillaume was able to demonstrate them prior to the end of the session.  Guillaume demonstrated good  understanding of the education provided. See Electronic Medical Record under Patient Instructions for exercises provided during therapy sessions    Assessment     PT continued with general strengthening and conditioning during today's session. Patient required periodic seated rest breaks due to fatigue and to allow him to catch his breath. He is most challenged with standing arm strengthening as well as with leg press. Patient was fatigued and therefore ambulating around the clinic was not performed today.   Guillaume Is progressing well towards his goals.   Patient prognosis is Fair.     Patient will continue to benefit from skilled outpatient physical therapy to address the deficits listed in the problem list box on initial evaluation, provide pt/family education and to maximize pt's level of independence in the home and community environment.     Patient's spiritual, cultural and educational needs considered and pt agreeable to plan of care and goals.     Anticipated barriers to physical therapy: co-morbidities    Goals: Short Term Goals: 6 weeks   Patient will be independent with Home exercise program to supplement therapy progressing, not met   Patient will be able to ambulate 1300 ft with 6 Minute walk test to improve endurance for community mobility  met  Patient will be able to lift and carry groceries without difficulty to improve ability to perform functional tasks  met     Long Term Goals: 12 weeks   Patient will be able to ambulate 1400 ft or more with 6 Minute walk test to  improve endurance for community mobility progressing, not met  Patient will be able to perform 16 sit to stand on 30 second sit to  order to improve functional strength and endurance for transfers met  Patient will improve FOTO score to 65 % to improve self perceived ability to perform functional tasks progressing, not met  Patient goal: Patient will be able to mow his lawn to return to prior level of function progressing, not met    Plan     Improve functional strength and endurance    Elvira Joyce, PT ,DPT,OCS    05/23/2025

## 2025-05-26 DIAGNOSIS — G47.00 INSOMNIA, UNSPECIFIED TYPE: ICD-10-CM

## 2025-05-28 ENCOUNTER — LAB VISIT (OUTPATIENT)
Dept: LAB | Facility: HOSPITAL | Age: 70
End: 2025-05-28
Attending: INTERNAL MEDICINE
Payer: MEDICARE

## 2025-05-28 ENCOUNTER — TELEPHONE (OUTPATIENT)
Dept: HEMATOLOGY/ONCOLOGY | Facility: CLINIC | Age: 70
End: 2025-05-28
Payer: MEDICARE

## 2025-05-28 ENCOUNTER — OFFICE VISIT (OUTPATIENT)
Dept: HEMATOLOGY/ONCOLOGY | Facility: CLINIC | Age: 70
End: 2025-05-28
Payer: MEDICARE

## 2025-05-28 ENCOUNTER — PATIENT MESSAGE (OUTPATIENT)
Dept: HEMATOLOGY/ONCOLOGY | Facility: CLINIC | Age: 70
End: 2025-05-28

## 2025-05-28 VITALS
SYSTOLIC BLOOD PRESSURE: 138 MMHG | BODY MASS INDEX: 21.85 KG/M2 | OXYGEN SATURATION: 99 % | WEIGHT: 144.19 LBS | RESPIRATION RATE: 18 BRPM | TEMPERATURE: 98 F | HEIGHT: 68 IN | HEART RATE: 76 BPM | DIASTOLIC BLOOD PRESSURE: 75 MMHG

## 2025-05-28 DIAGNOSIS — Z94.84 IMMUNOCOMPROMISED STATE ASSOCIATED WITH STEM CELL TRANSPLANT: ICD-10-CM

## 2025-05-28 DIAGNOSIS — D84.822 IMMUNOCOMPROMISED STATE ASSOCIATED WITH STEM CELL TRANSPLANT: ICD-10-CM

## 2025-05-28 DIAGNOSIS — G25.81 RESTLESS LEG SYNDROME: ICD-10-CM

## 2025-05-28 DIAGNOSIS — E61.0 COPPER DEFICIENCY: ICD-10-CM

## 2025-05-28 DIAGNOSIS — Z76.82 STEM CELL TRANSPLANT CANDIDATE: ICD-10-CM

## 2025-05-28 DIAGNOSIS — D61.818 PANCYTOPENIA: ICD-10-CM

## 2025-05-28 DIAGNOSIS — Z94.81 S/P ALLOGENEIC BONE MARROW TRANSPLANT: ICD-10-CM

## 2025-05-28 DIAGNOSIS — D46.9 MYELODYSPLASTIC SYNDROME: ICD-10-CM

## 2025-05-28 DIAGNOSIS — E53.8 B12 DEFICIENCY: ICD-10-CM

## 2025-05-28 DIAGNOSIS — D46.9 MYELODYSPLASTIC SYNDROME: Primary | ICD-10-CM

## 2025-05-28 DIAGNOSIS — G47.00 INSOMNIA, UNSPECIFIED TYPE: ICD-10-CM

## 2025-05-28 DIAGNOSIS — E53.8 FOLIC ACID DEFICIENCY: ICD-10-CM

## 2025-05-28 DIAGNOSIS — F41.9 ANXIETY: ICD-10-CM

## 2025-05-28 DIAGNOSIS — D89.813 GVHD (GRAFT VERSUS HOST DISEASE): ICD-10-CM

## 2025-05-28 LAB
ABSOLUTE NEUTROPHIL MANUAL (OHS): 1.5 K/UL
ALBUMIN SERPL BCP-MCNC: 3.4 G/DL (ref 3.5–5.2)
ALP SERPL-CCNC: 93 UNIT/L (ref 40–150)
ALT SERPL W/O P-5'-P-CCNC: 22 UNIT/L (ref 10–44)
ANION GAP (OHS): 8 MMOL/L (ref 8–16)
ANISOCYTOSIS BLD QL SMEAR: SLIGHT
AST SERPL-CCNC: 22 UNIT/L (ref 11–45)
BILIRUB SERPL-MCNC: 0.9 MG/DL (ref 0.1–1)
BUN SERPL-MCNC: 32 MG/DL (ref 8–23)
CALCIUM SERPL-MCNC: 8.9 MG/DL (ref 8.7–10.5)
CHLORIDE SERPL-SCNC: 106 MMOL/L (ref 95–110)
CO2 SERPL-SCNC: 27 MMOL/L (ref 23–29)
CREAT SERPL-MCNC: 1 MG/DL (ref 0.5–1.4)
ERYTHROCYTE [DISTWIDTH] IN BLOOD BY AUTOMATED COUNT: 13.8 % (ref 11.5–14.5)
GFR SERPLBLD CREATININE-BSD FMLA CKD-EPI: >60 ML/MIN/1.73/M2
GLUCOSE SERPL-MCNC: 212 MG/DL (ref 70–110)
HCT VFR BLD AUTO: 31.2 % (ref 40–54)
HGB BLD-MCNC: 10.7 GM/DL (ref 14–18)
HYPOCHROMIA BLD QL SMEAR: ABNORMAL
LDH SERPL-CCNC: 314 U/L (ref 110–260)
LYMPHOCYTES NFR BLD MANUAL: 24 % (ref 18–48)
MAGNESIUM SERPL-MCNC: 1.7 MG/DL (ref 1.6–2.6)
MCH RBC QN AUTO: 38.4 PG (ref 27–31)
MCHC RBC AUTO-ENTMCNC: 34.3 G/DL (ref 32–36)
MCV RBC AUTO: 112 FL (ref 82–98)
MONOCYTES NFR BLD MANUAL: 5 % (ref 4–15)
NEUTROPHILS NFR BLD MANUAL: 70 % (ref 38–73)
NEUTS BAND NFR BLD MANUAL: 1 %
NUCLEATED RBC (/100WBC) (OHS): 1 /100 WBC
OVALOCYTES BLD QL SMEAR: ABNORMAL
PHOSPHATE SERPL-MCNC: 3.1 MG/DL (ref 2.7–4.5)
PLATELET # BLD AUTO: 36 K/UL (ref 150–450)
PLATELET BLD QL SMEAR: ABNORMAL
PMV BLD AUTO: 13.2 FL (ref 9.2–12.9)
POIKILOCYTOSIS BLD QL SMEAR: SLIGHT
POLYCHROMASIA BLD QL SMEAR: ABNORMAL
POTASSIUM SERPL-SCNC: 3.9 MMOL/L (ref 3.5–5.1)
PROT SERPL-MCNC: 5.6 GM/DL (ref 6–8.4)
RBC # BLD AUTO: 2.79 M/UL (ref 4.6–6.2)
SODIUM SERPL-SCNC: 141 MMOL/L (ref 136–145)
URATE SERPL-MCNC: 4 MG/DL (ref 3.4–7)
WBC # BLD AUTO: 2.13 K/UL (ref 3.9–12.7)

## 2025-05-28 PROCEDURE — 80053 COMPREHEN METABOLIC PANEL: CPT

## 2025-05-28 PROCEDURE — 84100 ASSAY OF PHOSPHORUS: CPT

## 2025-05-28 PROCEDURE — 85027 COMPLETE CBC AUTOMATED: CPT

## 2025-05-28 PROCEDURE — 87799 DETECT AGENT NOS DNA QUANT: CPT

## 2025-05-28 PROCEDURE — 83735 ASSAY OF MAGNESIUM: CPT

## 2025-05-28 PROCEDURE — 80197 ASSAY OF TACROLIMUS: CPT

## 2025-05-28 PROCEDURE — 84550 ASSAY OF BLOOD/URIC ACID: CPT

## 2025-05-28 PROCEDURE — 36415 COLL VENOUS BLD VENIPUNCTURE: CPT

## 2025-05-28 PROCEDURE — 99999 PR PBB SHADOW E&M-EST. PATIENT-LVL IV: CPT | Mod: PBBFAC,,,

## 2025-05-28 PROCEDURE — 83615 LACTATE (LD) (LDH) ENZYME: CPT

## 2025-05-28 NOTE — PROGRESS NOTES
Section of Hematology and Stem Cell Transplantation    Post-Transplantation Follow Up Visit       Notes:    05/28/2025    Transplant History:   Primary oncologist: Paco Hickey MD  Primary oncologic diagnosis: MDS  Transplant date: 9/19/2024  Donor: haploidentical  Blood Type (Patient): B +  Blood Type (Donor): A +  CMV (Patient): Positive  CMV (Donor): Positive  Graft source: Bone marrow  CD34+ cell dose: 3.55x10^6  Conditioning Regimen: Fludarabine plus melphalan 100 mg/m2 + 2Gy TBI  GVHD prophylaxis: Post-transplant cyclophosphamide, Tacrolimus, MMF  Immediate post-transplant complications: Patient experienced expected GI toxicities with C diff negative diarrhea and nausea, neutropenic fever with negative infectious work up, expected cytopenias requiring transfusions, electrolyte abnormalities requiring replacement, volume overload requiring diuresis, intermittent SOB from pulmonary edema requiring 02, and a hemorrhoid flare up. Diarrhea and SOB improved towards the end of the hospital stay.     History of Present Ilness:   Guillaume Salinas (Guillaume) is a pleasant 69 y.o.male with a past medical history of MDS who is status post haploidentical stem cell transplantation conditioned with FluMel 100 + PTCy+ 2Gy TBI who is currently day +251  who presents for post-transplant follow up. His daughter translated today, they denied our  services.     Interval History:   Patient presents for routine follow-up post allogeneic transplant. He is doing very good overall. He does mention occasional dizziness when going from sitting to standing. He is no longer having any dizziness. His appetite and energy are well. No rashes, vomiting, diarrhea.       PAST MEDICAL HISTORY/:   Past Medical History:   Diagnosis Date    Anticoagulant long-term use     Coronary artery disease     Hypertension     Myelodysplastic syndrome     Peripheral vascular disease, unspecified        PAST SURGICAL HISTORY:   Past Surgical  History:   Procedure Laterality Date    BONE MARROW BIOPSY Left 2023    Procedure: Biopsy-bone marrow;  Surgeon: Harry Diamond MD;  Location: [facility] OR;  Service: Oncology;  Laterality: Left;    COLONOSCOPY N/A 2022    Procedure: COLONOSCOPY Golytely Vaccinated will bring cards;  Surgeon: Dereje Simon MD;  Location: [facility] ENDO;  Service: Endoscopy;  Laterality: N/A;  Do not cancel this order    ESOPHAGOGASTRODUODENOSCOPY N/A 2025    Procedure: EGD (ESOPHAGOGASTRODUODENOSCOPY);  Surgeon: Dudley Toth MD;  Location: [facility] ENDO;  Service: Endoscopy;  Laterality: N/A;  ref by Gilma Ugalde PA-C,portal-ae    INSERTION OF LEMONS CATHETER Right 2024    Procedure: INSERTION, CATHETER, CENTRAL VENOUS, LEMONS TRIPLE LUMEN;  Surgeon: Kg Patten MD;  Location: [address];  Service: General;  Laterality: Right;     PAST SOCIAL HISTORY:  Social History     Socioeconomic History    Marital status:    Tobacco Use    Smoking status: Former     Current packs/day: 0.00     Average packs/day: 0.3 packs/day for 50.0 years (12.5 ttl pk-yrs)     Types: Cigarettes     Start date: 3/1/1973     Quit date: 3/1/2023     Years since quittin.2     Passive exposure: Past    Smokeless tobacco: Never   Substance and Sexual Activity    Alcohol use: Not Currently    Drug use: Never    Sexual activity: Not Currently     Partners: Female     Social Drivers of Health     Financial Resource Strain: Patient Declined (2024)    Overall Financial Resource Strain (CARDIA)     Difficulty of Paying Living Expenses: Patient declined   Food Insecurity: Patient Declined (2024)    Hunger Vital Sign     Worried About Running Out of Food in the Last Year: Patient declined     Ran Out of Food in the Last Year: Patient declined   Transportation Needs: Patient Declined (2024)    TRANSPORTATION NEEDS     Transportation : Patient declined   Physical Activity: Inactive (1/10/2025)     Exercise Vital Sign     Days of Exercise per Week: 0 days     Minutes of Exercise per Session: 10 min   Stress: Patient Declined (12/28/2024)    [employer] - Occupational Stress Questionnaire     Feeling of Stress : Patient declined   Recent Concern: Stress - Stress Concern Present (9/30/2024)    [employer] [place] - Occupational Stress Questionnaire     Feeling of Stress : To some extent   Housing Stability: Patient Declined (12/28/2024)    Housing Stability Vital Sign     Unable to Pay for Housing in the Last Year: Patient declined     Homeless in the Last Year: Patient declined       FAMILY HISTORY:  Cancer-related family history includes Cancer in his brother and father.    CURRENT MEDICATIONS:   Medication List with Changes/Refills   Current Medications    ACYCLOVIR (ZOVIRAX) 800 MG TAB    Take 1 tablet (800 mg total) by mouth 2 (two) times daily.    ATOVAQUONE (MEPRON) 750 MG/5 ML SUSP ORAL LIQUID    Take 10 mLs (1,500 mg total) by mouth once daily.    BUDESONIDE (ENTOCORT EC) 3 MG CAPSULE    Take 1 capsule (3 mg total) by mouth 3 (three) times daily.    CARVEDILOL (COREG) 3.125 MG TABLET    Take 1 tablet (3.125 mg total) by mouth 2 (two) times daily.    CYANOCOBALAMIN 1,000 MCG/ML INJECTION    Inject 1 mL (1,000 mcg total) into the muscle once a week.    ELTROMBOPAG OLAMINE (PROMACTA) 50 MG TAB    Take 1 tablet (50 mg total) by mouth once daily.    FOLIC ACID (FOLVITE) 1 MG TABLET    Take 1 tablet (1 mg total) by mouth once daily.    GABAPENTIN (NEURONTIN) 300 MG CAPSULE    Take 2 capsules (600 mg total) by mouth nightly as needed (Restless leg).    HYDROCORTISONE 1 % CREAM    Apply to affected area 2 times daily    LETERMOVIR (PREVYMIS) 480 MG TAB    Take 1 tablet (480 mg total) by mouth Daily.    LEVOFLOXACIN (LEVAQUIN) 500 MG TABLET    Take 1 tablet (500 mg total) by mouth once daily.    LOPERAMIDE (IMODIUM A-D) 2 MG TAB    Take 2 mg by mouth 4 (four)  times daily as needed.    MAGNESIUM OXIDE (MAG-OX) 400 MG (241.3 MG MAGNESIUM) TABLET    Take 2 tablets (800 mg total) by mouth 2 (two) times daily.    ONDANSETRON (ZOFRAN-ODT) 8 MG TBDL    Take 1 tablet (8 mg total) by mouth every 8 (eight) hours as needed (nausea).    PANTOPRAZOLE (PROTONIX) 40 MG TABLET    Take 1 tablet (40 mg total) by mouth once daily.    POSACONAZOLE (NOXAFIL) 100 MG TBEC TABLET    Take 3 tablets (300 mg total) by mouth once daily.    PREDNISONE (DELTASONE) 10 MG TABLET    Take 3 tablets (30 mg total) by mouth once daily.    ROPINIROLE (REQUIP) 0.5 MG TABLET    Take 1 tablet (0.5 mg total) by mouth every evening.    TACROLIMUS (PROGRAF) 0.5 MG CAP    Take 1 capsule (0.5 mg total) by mouth every morning AND 1 capsule (0.5 mg total) every evening.    TAMSULOSIN (FLOMAX) 0.4 MG CAP    Take 1 capsule (0.4 mg total) by mouth once daily.    UNABLE TO FIND    Take by mouth once daily. medication name: copper    ZOLPIDEM (AMBIEN) 5 MG TAB    Take 1 tablet (5 mg total) by mouth nightly as needed (insomnia).       ALLERGIES:   Review of patient's allergies indicates:  No Known Allergies    GVHD Review of Systems:     Pertinent positives and negatives included in the HPI. Otherwise a 14 point review of systems is negative. GVHD review of systems recorded in BMT flowsheet.     Physical Exam:     Vitals:    05/28/25 1614   BP: 138/75   Pulse: 76   Resp: 18   Temp: 98.2 °F (36.8 °C)         General: Appears well, NAD.   HEENT: MMM, no OP lesions  Pulmonary: CTAB, no increased work of breathing, no W/R/C  Cardiovascular: S1S2 normal, RRR, no M/R/G  Abdominal: Soft, NT, ND, BS+, no HSM  Extremities: No C/C/E  Neurological: AAOx4, grossly normal, no focal deficits  Dermatologic: No rashes or lesions     ECOG Performance Status: (foot note - ECOG PS provided by [employer]cology [employer]) 1 - Symptomatic but completely ambulatory    Karnofsky Performance Score:  80%- Normal Activity with Effort: Some  Symptoms of Disease    Labs:   Lab Results   Component Value Date    WBC 2.13 (L) 05/28/2025    HGB 10.7 (L) 05/28/2025    HCT 31.2 (L) 05/28/2025     (H) 05/28/2025    PLT 36 (LL) 05/28/2025        CMP  Sodium   Date Value Ref Range Status   05/28/2025 141 136 - 145 mmol/L Final   03/17/2025 140 136 - 145 mmol/L Final     Potassium   Date Value Ref Range Status   05/28/2025 3.9 3.5 - 5.1 mmol/L Final   03/17/2025 4.9 3.5 - 5.1 mmol/L Final     Chloride   Date Value Ref Range Status   05/28/2025 106 95 - 110 mmol/L Final   03/17/2025 105 95 - 110 mmol/L Final     CO2   Date Value Ref Range Status   05/28/2025 27 23 - 29 mmol/L Final   03/17/2025 26 23 - 29 mmol/L Final     Glucose   Date Value Ref Range Status   05/28/2025 212 (H) 70 - 110 mg/dL Final   03/17/2025 109 70 - 110 mg/dL Final     BUN   Date Value Ref Range Status   05/28/2025 32 (H) 8 - 23 mg/dL Final     Creatinine   Date Value Ref Range Status   05/28/2025 1.0 0.5 - 1.4 mg/dL Final     Calcium   Date Value Ref Range Status   05/28/2025 8.9 8.7 - 10.5 mg/dL Final   03/17/2025 9.3 8.7 - 10.5 mg/dL Final     Protein Total   Date Value Ref Range Status   05/28/2025 5.6 (L) 6.0 - 8.4 gm/dL Final     Total Protein   Date Value Ref Range Status   03/17/2025 6.1 6.0 - 8.4 g/dL Final     Albumin   Date Value Ref Range Status   05/28/2025 3.4 (L) 3.5 - 5.2 g/dL Final   03/17/2025 3.5 3.5 - 5.2 g/dL Final     Total Bilirubin   Date Value Ref Range Status   03/17/2025 0.5 0.1 - 1.0 mg/dL Final     Comment:     For infants and newborns, interpretation of results should be based  on gestational age, weight and in agreement with clinical  observations.    Premature Infant recommended reference ranges:  Up to 24 hours.............<8.0 mg/dL  Up to 48 hours............<12.0 mg/dL  3-5 days..................<15.0 mg/dL  6-29 days.................<15.0 mg/dL       Bilirubin Total   Date Value Ref Range Status   05/28/2025 0.9 0.1 - 1.0 mg/dL Final     Comment:      For infants and newborns, interpretation of results should be based   on gestational age, weight and in agreement with clinical   observations.    Premature Infant recommended reference ranges:   0-24 hours:  <8.0 mg/dL   24-48 hours: <12.0 mg/dL   3-5 days:    <15.0 mg/dL   6-29 days:   <15.0 mg/dL     Alkaline Phosphatase   Date Value Ref Range Status   03/17/2025 66 40 - 150 U/L Final     ALP   Date Value Ref Range Status   05/28/2025 93 40 - 150 unit/L Final     AST   Date Value Ref Range Status   05/28/2025 22 11 - 45 unit/L Final   03/17/2025 31 10 - 40 U/L Final     ALT   Date Value Ref Range Status   05/28/2025 22 10 - 44 unit/L Final   03/17/2025 35 10 - 44 U/L Final     Anion Gap   Date Value Ref Range Status   05/28/2025 8 8 - 16 mmol/L Final     eGFR   Date Value Ref Range Status   05/28/2025 >60 >60 mL/min/1.73/m2 Final     Comment:     Estimated GFR calculated using the CKD-EPI creatinine (2021) equation.   03/17/2025 >60 >60 mL/min/1.73 m^2 Final          Imaging:   Hospital imaging reviewed.    Pathology:  Prior pathology reviewed.     Acute GVHD Scoring:  GVHD Acute Assessment- Charted               Assessment and Plan:   Guillaume Salinas (Guillaume) is a pleasant 69 y.o.male with a past medical history of MDS s/p haplo SCT who presents for post-transplant follow up.    MDS: Status post treatment with azacitidine 75 mg/m2 daily x7 days plus venetoclax 100mg (voriconazole) daily x14 of 28 days.Venetoclax decreased to 7 days with cycle 3 until completion of 11 cycles. No plans for maintenance at this time.     Status post allogeneic stem cell transplantation: As noted above, status post haploidentical stem cell transplantation conditioned with FluMel 100 + PTCy+ 2Gy TBI . Currently Day+ 251. Engrafted on 10/09/24 day +20.  Day 30 bone marrow (11/5/2024) showing mildly hypercellular marrow with no increased blast (0.3%) and no evidence of myeloid neoplasm. Pending chimerisms, NGS, and CG  Day  100 bone marrow biopsy on 12/31/24 showed 30-40% cellularity, erythroid hyperplasia, dyserythropoiesis, decreased megakaryocytes with atypical morphology. No hemophagocytosis mentioned. NGS, FISH, and CG normal. 100% chimerisms.     3.    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 He started budesonide 3mg TID on 10/28/24. Due to persistent nausea despite budesonide so started systemic steroids 11/1 at 0.5 mg/kg and his steroid taper has been given to him. Steroid taper completed 12/8/24. No evidence of aGVHD today. 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 Taper completed. Patient's daughter sent messages through the portal about his nausea coming back with no improvement from zofran so budesonide 3mg TID was sent in on 2/6/25. Budesonide taper ended 3/13/25. Can consider tapering off tacrolimus in next few weeks if GVHD does not return. Skin gvhd present on face with erythema, inflammation, and sand paper feel on 3/14/25. Rash now improved on 4/7/25 with now only have some erythema likely from skin irration from hydrocortisone cream- cream stopped today. 4/23/25 Patient reports persistent nausea and appetite suppression; Budesonide 3mg TID started again; he has an EGD on 5/8/25. Start tapering budesonide 5/9/25. 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 Due to nausea still being present he will increase budesonide back up to TID and adding 0.5 mg/kg of prednisone (30 mg)on 5/21/25. Steroid taper below. He is also slowly tapering budesonide, also listed below.   A. Current tacro dose: 0.5 mg BID  B. Last tacro level: 7.9   C. Adjustments: N/a    Prednisone taper  5/28 - 6/4 Take 2 tablets (20mg) once a day  6/5 - 6/11 Take 1 tablet (10mg) once a day  6/12 - 6/18  Take ½ tablet (5mg) once a day  6/19 - 6/25 Take ½ tablet every other day  6/26 STOP     Budesonide taper  5/29/25 - 5/31/25: Budesonide 3mg twice daily (AM + PM)  6/1/25 - 6/3/25: Budesonide 3mg once daily  6/4/25: STOP    4.     Immunosuppression: Prevention with posaconazole, acyclovir. CMV prophylaxis with letermovir. PJP prophyalxis atovaquone. Continue weekly monitoring of CMV and EBV.  Last CMV: Not-detected,   last EBV: negative  Active infections: N/A    Pancytopenia: Due to underlying disease and chemotherapy. Transfuse for Hgb <7 g/dL and platelets <10k. Folate and copper deficient, on supplementation. Will continue to monitor to see if platelets begin to rise with his supplements. Promacta sent in on 12/9/24. Change TMP/SMX to atovaquone. Continue promacta and supplements listed below . Promacta increased today (5/28/25) to 100mg daily   Folic Acid deficiency: Continue folic acid 1mg daily  Copper deficiency: Copper level of 635, continue copper gluconate 2mg daily   B12 deficiency: Patient will get his next b12 injection monthly. He received one today in clinic. Next one around 6/7/25    Post transplant lymphoproliferative disorder: Patient received rituxan on 12/30/24 due to possible PTLD with elevated EBV and possible HLH. Now EBV has improved.   EBV negative     Hypomagnesemia: Continue MagOx to 800mg twice daily.       Nausea: Resolved with steroids and the taper originally ended 12/8/24. He ended up having nausea again while hospitalized and was again started on steroid and taper ended 1/28/25. Patient's daughter sent messages through the portal about his nausea coming back with no improvement from zofran so budesonide 3mg TID was sent in on 2/6/25. Budesonide taper ended 3/13/25.  Message sent through portal on 4/17/25 by his daughter about him having nausea again. Advised EGD to determine if it is GVHD due to nausea being intermittent many times. EGD scheduled for 5/8/25. EGD biopsy showed grade  two acute GVHD. Due to nausea still being present he will increase budesonide back up to TID and adding 0.5 mg/kg of prednisone (30 mg)(5/21/25). Steroid taper provided today and listed under GVHD    Anxiety, Restless Leg Syndrome: Noted since discharge by family. He started ropinirole 0.5mg and has experienced significant improvement.  Symptoms have resolved. Continue Ropinirole    Insomnia: Patient now sleeping well and only waking up to urinate at night. Continue Ambien and Ropinirole for RLS.      Blood pressure abnormalities: For awhile patient had been holding off of his carvedilol due to hypotensions and dizziness. His pressures at home have been averaging 170s/90s so he started the carvedilol 6.25mg BID again. He began to complain of orthostatic hypotension symptoms when he takes the medication and BP in clinic is 105/55. His carvedilol was lowered to 3.125 daily. Today his blood pressure is normotensive. For now continue carvedilol 3.125mg BID and will monitor and make adjustments as needed.    12. Benign prostate hyperplasia : Patient has been experiencing urinary frequency for many weeks now but denies dysuria and hematuria. Urinalysis  was negative.  He saw urology 5/5/25 and was started on flomax for BPH.    13. Orthostatic hypotension: Advised he go slowly from sitting to standing while holding on to the something. Also advised increase in calorie intake. Continue hydrating.     Orders Placed:             Follow Up: Every other week with weekly labs       BMT Chart Routing      Follow up with physician    Follow up with [facility] 2 weeks. Gilma   Provider visit type    Infusion scheduling note    Injection scheduling note    Labs   Scheduling:  Preferred lab:  Lab interval:  Scheduled   Imaging    Pharmacy appointment    Other referrals                     A total of 40 minutes was spent in pre-visit chart review, personal interpretation of labs and imaging, and medication review. Total visit time 35  minutes, >50 % counseling.     Visit today included increased complexity associated with the care of the episodic problem gut GVHD addressed and managing the longitudinal care of the patient due to the serious and/or complex managed problem(s) s/p allogeneic stem cell transplant.       Gilma Ugalde PA-C  Malignant Hematology, Stem Cell Transplant, and Cellular Therapy  [facility]  [facility] [place]

## 2025-05-29 ENCOUNTER — RESULTS FOLLOW-UP (OUTPATIENT)
Dept: HEMATOLOGY/ONCOLOGY | Facility: CLINIC | Age: 70
End: 2025-05-29

## 2025-05-29 LAB
CYTOMEGALOVIRUS DNA, QUAL (OHS): NOT DETECTED
EPSTEIN-BARR VIRUS DNA, QUAL (OHS): NORMAL
TACROLIMUS BLD-MCNC: 7.9 NG/ML (ref 5–15)

## 2025-05-29 NOTE — PROGRESS NOTES
BMT Pharmacist Immunosuppression Note:    Reviewed patient's tacrolimus level with Dr. Hickey and it is within goal range.      Current regimen: 0.5 mg BID  Capsule size(s): 0.5 mg     Plan: Continue current regimen.        Lab Results   Component Value Date    TACROLIMUS 7.9 05/28/2025    TACROLIMUS 9.8 05/21/2025    TACROLIMUS 7.6 05/14/2025       Creatinine   Date Value Ref Range Status   05/28/2025 1.0 0.5 - 1.4 mg/dL Final   05/21/2025 1.5 (H) 0.5 - 1.4 mg/dL Final   05/14/2025 1.0 0.5 - 1.4 mg/dL Final     Total Bilirubin   Date Value Ref Range Status   03/17/2025 0.5 0.1 - 1.0 mg/dL Final     Comment:     For infants and newborns, interpretation of results should be based  on gestational age, weight and in agreement with clinical  observations.    Premature Infant recommended reference ranges:  Up to 24 hours.............<8.0 mg/dL  Up to 48 hours............<12.0 mg/dL  3-5 days..................<15.0 mg/dL  6-29 days.................<15.0 mg/dL     03/10/2025 0.4 0.1 - 1.0 mg/dL Final     Comment:     For infants and newborns, interpretation of results should be based  on gestational age, weight and in agreement with clinical  observations.    Premature Infant recommended reference ranges:  Up to 24 hours.............<8.0 mg/dL  Up to 48 hours............<12.0 mg/dL  3-5 days..................<15.0 mg/dL  6-29 days.................<15.0 mg/dL     03/03/2025 0.5 0.1 - 1.0 mg/dL Final     Comment:     For infants and newborns, interpretation of results should be based  on gestational age, weight and in agreement with clinical  observations.    Premature Infant recommended reference ranges:  Up to 24 hours.............<8.0 mg/dL  Up to 48 hours............<12.0 mg/dL  3-5 days..................<15.0 mg/dL  6-29 days.................<15.0 mg/dL       Bilirubin Total   Date Value Ref Range Status   05/28/2025 0.9 0.1 - 1.0 mg/dL Final     Comment:     For infants and newborns, interpretation of results should  be based   on gestational age, weight and in agreement with clinical   observations.    Premature Infant recommended reference ranges:   0-24 hours:  <8.0 mg/dL   24-48 hours: <12.0 mg/dL   3-5 days:    <15.0 mg/dL   6-29 days:   <15.0 mg/dL   05/21/2025 0.4 0.1 - 1.0 mg/dL Final     Comment:     For infants and newborns, interpretation of results should be based   on gestational age, weight and in agreement with clinical   observations.    Premature Infant recommended reference ranges:   0-24 hours:  <8.0 mg/dL   24-48 hours: <12.0 mg/dL   3-5 days:    <15.0 mg/dL   6-29 days:   <15.0 mg/dL   05/14/2025 0.5 0.1 - 1.0 mg/dL Final     Comment:     For infants and newborns, interpretation of results should be based   on gestational age, weight and in agreement with clinical   observations.    Premature Infant recommended reference ranges:   0-24 hours:  <8.0 mg/dL   24-48 hours: <12.0 mg/dL   3-5 days:    <15.0 mg/dL   6-29 days:   <15.0 mg/dL     AST   Date Value Ref Range Status   05/28/2025 22 11 - 45 unit/L Final   05/21/2025 17 11 - 45 unit/L Final   05/14/2025 18 11 - 45 unit/L Final   03/17/2025 31 10 - 40 U/L Final   03/10/2025 23 10 - 40 U/L Final   03/03/2025 26 10 - 40 U/L Final     ALT   Date Value Ref Range Status   05/28/2025 22 10 - 44 unit/L Final   05/21/2025 15 10 - 44 unit/L Final   05/14/2025 18 10 - 44 unit/L Final   03/17/2025 35 10 - 44 U/L Final   03/10/2025 31 10 - 44 U/L Final   03/03/2025 36 10 - 44 U/L Final             Cristofer Kruse, PharmD  [id number]

## 2025-05-30 ENCOUNTER — CLINICAL SUPPORT (OUTPATIENT)
Dept: REHABILITATION | Facility: HOSPITAL | Age: 70
End: 2025-05-30
Payer: MEDICARE

## 2025-05-30 DIAGNOSIS — Z74.09 DECREASED STRENGTH, ENDURANCE, AND MOBILITY: Primary | ICD-10-CM

## 2025-05-30 DIAGNOSIS — R53.1 DECREASED STRENGTH, ENDURANCE, AND MOBILITY: Primary | ICD-10-CM

## 2025-05-30 DIAGNOSIS — R68.89 DECREASED STRENGTH, ENDURANCE, AND MOBILITY: Primary | ICD-10-CM

## 2025-05-30 PROCEDURE — 97112 NEUROMUSCULAR REEDUCATION: CPT | Mod: PN

## 2025-05-30 PROCEDURE — 97530 THERAPEUTIC ACTIVITIES: CPT | Mod: PN

## 2025-05-30 NOTE — PROGRESS NOTES
"[facility]   Physical Therapy Treatment Note     Name: Guillaume Salinas  Clinic Number: [id number]    Therapy Diagnosis:   Encounter Diagnosis   Name Primary?    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  Response to previous treatment: very good, a little tired  Functional change: ongoing    Pain: 0/10  Location: bilateral legs     Objective    5/9/2025    30 second sit to stand: 16 reps without use of hands  6 Minute walk test: 1391 ft     Treatment     Guillaume received the treatments listed below:      neuromuscular re-education activities to improve: Balance, Coordination, Proprioception, and Posture for 24 minutes. The following activities were included:  Cybex leg extension 15lb 3x10   Cybex leg press 3 plates 2x10   Standing hip abduction Green theraband 3x10 ea  Standing hip extension Green  theraband 3x10 ea   Standing shoulder extension green theraband 3x10   Rows green theraband 3x10   Standing T's green theraband 3x10     therapeutic activities to improve functional performance for 30 minutes, including:  Nustep x 8 minutes lvl 2  Standing heel raises 3x10   Step ups 6" 2x10 ea  Lateral step up 6" 2x10 ea   Sit to stand from blue chair x10   8 minute walk around clinic "     NT:  Supine active range of motion shoulder flexion 3lb wand x30   Supine chest press 3lb wand 3x10  Seated Bicep curl 3lb 3x10 ea         Patient Education and Home Exercises       Education provided:   - updated Home exercise program     Written Home Exercises Provided: Pt instructed to continue prior HEP. Exercises were reviewed and Guillaume was able to demonstrate them prior to the end of the session.  Guillaume demonstrated good  understanding of the education provided. See Electronic Medical Record under Patient Instructions for exercises provided during therapy sessions    Assessment     Patient tolerated today's treatment session without reports of pain. He was appropriately fatigued with all strengthening and endurance exercises. PT began session with walking and ended session with Nustep to improve cardiovascular endurance. Patient required minor seated rest breaks due to fatigue.   Guillaume Is progressing well towards his goals.   Patient prognosis is Fair.     Patient will continue to benefit from skilled outpatient physical therapy to address the deficits listed in the problem list box on initial evaluation, provide pt/family education and to maximize pt's level of independence in the home and community environment.     Patient's spiritual, cultural and educational needs considered and pt agreeable to plan of care and goals.     Anticipated barriers to physical therapy: co-morbidities    Goals: Short Term Goals: 6 weeks   Patient will be independent with Home exercise program to supplement therapy progressing, not met   Patient will be able to ambulate 1300 ft with 6 Minute walk test to improve endurance for community mobility  met  Patient will be able to lift and carry groceries without difficulty to improve ability to perform functional tasks  met     Long Term Goals: 12 weeks   Patient will be able to ambulate 1400 ft or more with 6 Minute walk test to improve endurance for community mobility  progressing, not met  Patient will be able to perform 16 sit to stand on 30 second sit to  order to improve functional strength and endurance for transfers met  Patient will improve FOTO score to 65 % to improve self perceived ability to perform functional tasks progressing, not met  Patient goal: Patient will be able to mow his lawn to return to prior level of function progressing, not met    Plan     Improve functional strength and endurance    Elvira Joyce, PT ,DPT,OCS    05/30/2025

## 2025-05-31 DIAGNOSIS — D84.822 IMMUNODEFICIENCY DUE TO EXTERNAL CAUSE: ICD-10-CM

## 2025-06-02 DIAGNOSIS — I10 PRIMARY HYPERTENSION: ICD-10-CM

## 2025-06-02 RX ORDER — ATOVAQUONE 750 MG/5ML
1500 SUSPENSION ORAL DAILY
Qty: 210 ML | Refills: 2 | Status: SHIPPED | OUTPATIENT
Start: 2025-06-02

## 2025-06-02 RX ORDER — ZOLPIDEM TARTRATE 5 MG/1
5 TABLET ORAL NIGHTLY PRN
Qty: 30 TABLET | Refills: 0 | Status: SHIPPED | OUTPATIENT
Start: 2025-06-02

## 2025-06-04 ENCOUNTER — LAB VISIT (OUTPATIENT)
Dept: LAB | Facility: HOSPITAL | Age: 70
End: 2025-06-04
Attending: INTERNAL MEDICINE
Payer: MEDICARE

## 2025-06-04 ENCOUNTER — TELEPHONE (OUTPATIENT)
Dept: HEMATOLOGY/ONCOLOGY | Facility: CLINIC | Age: 70
End: 2025-06-04
Payer: MEDICARE

## 2025-06-04 DIAGNOSIS — Z76.82 STEM CELL TRANSPLANT CANDIDATE: ICD-10-CM

## 2025-06-04 DIAGNOSIS — D64.9 SYMPTOMATIC ANEMIA: ICD-10-CM

## 2025-06-04 DIAGNOSIS — D46.9 MYELODYSPLASTIC SYNDROME: ICD-10-CM

## 2025-06-04 LAB
ABSOLUTE NEUTROPHIL MANUAL (OHS): 1.3 K/UL
ALBUMIN SERPL BCP-MCNC: 3.4 G/DL (ref 3.5–5.2)
ALP SERPL-CCNC: 74 UNIT/L (ref 40–150)
ALT SERPL W/O P-5'-P-CCNC: 26 UNIT/L (ref 10–44)
ANION GAP (OHS): 7 MMOL/L (ref 8–16)
ANISOCYTOSIS BLD QL SMEAR: ABNORMAL
AST SERPL-CCNC: 20 UNIT/L (ref 11–45)
BILIRUB SERPL-MCNC: 1 MG/DL (ref 0.1–1)
BUN SERPL-MCNC: 44 MG/DL (ref 8–23)
CALCIUM SERPL-MCNC: 8.9 MG/DL (ref 8.7–10.5)
CHLORIDE SERPL-SCNC: 104 MMOL/L (ref 95–110)
CO2 SERPL-SCNC: 28 MMOL/L (ref 23–29)
CREAT SERPL-MCNC: 1.1 MG/DL (ref 0.5–1.4)
DACRYOCYTES BLD QL SMEAR: ABNORMAL
EOSINOPHIL NFR BLD MANUAL: 1 % (ref 0–8)
ERYTHROCYTE [DISTWIDTH] IN BLOOD BY AUTOMATED COUNT: 16.4 % (ref 11.5–14.5)
GFR SERPLBLD CREATININE-BSD FMLA CKD-EPI: >60 ML/MIN/1.73/M2
GLUCOSE SERPL-MCNC: 210 MG/DL (ref 70–110)
HCT VFR BLD AUTO: 28.8 % (ref 40–54)
HGB BLD-MCNC: 9.9 GM/DL (ref 14–18)
INDIRECT COOMBS: NORMAL
INR PPP: 1 (ref 0.8–1.2)
LDH SERPL-CCNC: 369 U/L (ref 110–260)
LYMPHOCYTES NFR BLD MANUAL: 20 % (ref 18–48)
MAGNESIUM SERPL-MCNC: 1.9 MG/DL (ref 1.6–2.6)
MCH RBC QN AUTO: 39.8 PG (ref 27–31)
MCHC RBC AUTO-ENTMCNC: 34.4 G/DL (ref 32–36)
MCV RBC AUTO: 116 FL (ref 82–98)
MONOCYTES NFR BLD MANUAL: 11 % (ref 4–15)
NEUTROPHILS NFR BLD MANUAL: 68 % (ref 38–73)
NUCLEATED RBC (/100WBC) (OHS): 2 /100 WBC
PHOSPHATE SERPL-MCNC: 3 MG/DL (ref 2.7–4.5)
PLATELET # BLD AUTO: 20 K/UL (ref 150–450)
PLATELET BLD QL SMEAR: ABNORMAL
PMV BLD AUTO: ABNORMAL FL
POIKILOCYTOSIS BLD QL SMEAR: SLIGHT
POLYCHROMASIA BLD QL SMEAR: ABNORMAL
POTASSIUM SERPL-SCNC: 4.3 MMOL/L (ref 3.5–5.1)
PROT SERPL-MCNC: 5.5 GM/DL (ref 6–8.4)
PROTHROMBIN TIME: 11 SECONDS (ref 9–12.5)
RBC # BLD AUTO: 2.49 M/UL (ref 4.6–6.2)
RH BLD: NORMAL
SCHISTOCYTES BLD QL SMEAR: PRESENT
SODIUM SERPL-SCNC: 139 MMOL/L (ref 136–145)
SPECIMEN OUTDATE: NORMAL
URATE SERPL-MCNC: 3.8 MG/DL (ref 3.4–7)
WBC # BLD AUTO: 1.89 K/UL (ref 3.9–12.7)

## 2025-06-04 PROCEDURE — 80053 COMPREHEN METABOLIC PANEL: CPT

## 2025-06-04 PROCEDURE — 86850 RBC ANTIBODY SCREEN: CPT | Performed by: INTERNAL MEDICINE

## 2025-06-04 PROCEDURE — 83735 ASSAY OF MAGNESIUM: CPT

## 2025-06-04 PROCEDURE — 36415 COLL VENOUS BLD VENIPUNCTURE: CPT

## 2025-06-04 PROCEDURE — 85610 PROTHROMBIN TIME: CPT

## 2025-06-04 PROCEDURE — 87799 DETECT AGENT NOS DNA QUANT: CPT | Mod: 91

## 2025-06-04 PROCEDURE — 85025 COMPLETE CBC W/AUTO DIFF WBC: CPT

## 2025-06-04 PROCEDURE — 83615 LACTATE (LD) (LDH) ENZYME: CPT

## 2025-06-04 PROCEDURE — 80197 ASSAY OF TACROLIMUS: CPT

## 2025-06-04 PROCEDURE — 84550 ASSAY OF BLOOD/URIC ACID: CPT

## 2025-06-04 PROCEDURE — 84100 ASSAY OF PHOSPHORUS: CPT

## 2025-06-05 ENCOUNTER — PATIENT MESSAGE (OUTPATIENT)
Dept: PHARMACY | Facility: HOSPITAL | Age: 70
End: 2025-06-05
Payer: MEDICARE

## 2025-06-05 LAB
CYTOMEGALOVIRUS DNA, QUAL (OHS): NOT DETECTED
EPSTEIN-BARR VIRUS DNA, QUAL (OHS): NORMAL
EPSTEIN-BARR VIRUS DNA, QUAL (OHS): NORMAL
TACROLIMUS BLD-MCNC: 6.9 NG/ML (ref 5–15)

## 2025-06-06 ENCOUNTER — PATIENT MESSAGE (OUTPATIENT)
Dept: HEMATOLOGY/ONCOLOGY | Facility: CLINIC | Age: 70
End: 2025-06-06
Payer: MEDICARE

## 2025-06-06 DIAGNOSIS — F41.9 ANXIETY: Primary | ICD-10-CM

## 2025-06-06 RX ORDER — LORAZEPAM 1 MG/1
1 TABLET ORAL ONCE
Qty: 1 TABLET | Refills: 0 | Status: SHIPPED | OUTPATIENT
Start: 2025-06-06 | End: 2025-06-06

## 2025-06-09 ENCOUNTER — PROCEDURE VISIT (OUTPATIENT)
Dept: HEMATOLOGY/ONCOLOGY | Facility: CLINIC | Age: 70
End: 2025-06-09
Payer: MEDICARE

## 2025-06-09 VITALS
HEART RATE: 78 BPM | OXYGEN SATURATION: 97 % | TEMPERATURE: 98 F | BODY MASS INDEX: 22.21 KG/M2 | WEIGHT: 146.06 LBS | DIASTOLIC BLOOD PRESSURE: 66 MMHG | RESPIRATION RATE: 18 BRPM | SYSTOLIC BLOOD PRESSURE: 127 MMHG

## 2025-06-09 DIAGNOSIS — D46.9 MYELODYSPLASTIC SYNDROME, UNSPECIFIED: ICD-10-CM

## 2025-06-09 DIAGNOSIS — Z94.81 S/P ALLOGENEIC BONE MARROW TRANSPLANT: Primary | ICD-10-CM

## 2025-06-09 DIAGNOSIS — D46.4 REFRACTORY ANEMIA, UNSPECIFIED: ICD-10-CM

## 2025-06-09 PROCEDURE — 38222 DX BONE MARROW BX & ASPIR: CPT | Mod: LT,S$GLB,, | Performed by: NURSE PRACTITIONER

## 2025-06-09 PROCEDURE — 88299 UNLISTED CYTOGENETIC STUDY: CPT

## 2025-06-09 PROCEDURE — 81268 CHIMERISM ANAL W/CELL SELECT: CPT

## 2025-06-09 PROCEDURE — 88237 TISSUE CULTURE BONE MARROW: CPT

## 2025-06-09 PROCEDURE — 99499 UNLISTED E&M SERVICE: CPT | Mod: S$GLB,,, | Performed by: NURSE PRACTITIONER

## 2025-06-09 PROCEDURE — 88271 CYTOGENETICS DNA PROBE: CPT

## 2025-06-09 PROCEDURE — 88311 DECALCIFY TISSUE: CPT | Mod: TC,59

## 2025-06-09 PROCEDURE — 88185 FLOWCYTOMETRY/TC ADD-ON: CPT | Mod: 91

## 2025-06-09 RX ORDER — LIDOCAINE HYDROCHLORIDE 20 MG/ML
10 INJECTION, SOLUTION INFILTRATION; PERINEURAL
Status: COMPLETED | OUTPATIENT
Start: 2025-06-09 | End: 2025-06-09

## 2025-06-09 RX ADMIN — LIDOCAINE HYDROCHLORIDE 10 ML: 20 INJECTION, SOLUTION INFILTRATION; PERINEURAL at 02:06

## 2025-06-09 NOTE — PROCEDURES
Bone marrow    Date/Time: 6/9/2025 2:00 PM    Performed by: Judy Anthony NP  Authorized by: Gilma Ugalde PA-C    Consent Done?: Yes (Written)   Immediately prior to procedure a time out was called to verify the correct patient, procedure, equipment, support staff and site/side marked as required. .      Position: prone  Aspiration?: Yes   Biopsy?: Yes    Wife translated consent and during procedure for patient. Hospital  denied by patient.    PROCEDURE NOTE:  Date of Procedure: 06/09/2025  Bone Marrow Biopsy and Aspiration  Indication: MDS Day +263 s/p Haplo BM; now with worsening cytopenias  Consent: Informed consent was obtained from patient.  Timeout: Done and documented. Allergies reviewed.  Position: prone  Site: left posterior illiac crest.  Prep: Betadine.  Needle used: 11 gauge Jamshidi needle.  Anesthetic: 2% lidocaine 5 cc.  Biopsy: The biopsy needle was introduced into the marrow cavity and an aspirate was obtained without complications and sent for flow cytometry, MDS fish, NGS, DNA/RNA hold, transplant chimerisms, and cytogenetics. Core biopsy obtained without difficulty and sent for routine histologic examination.  Complications: None.  Disposition: The patient was placed supine following procedure. RN to assess bandaid for bleeding prior to discharge home. Patient aware to keep bandaid dry and intact for 24hrs and to call clinic for any bleeding, fevers, pain, or signs of infection.  Blood loss: Minimal.     Judy Anthony NP  Hematology/Oncology/BMT

## 2025-06-10 RX ORDER — CARVEDILOL 3.12 MG/1
3.12 TABLET ORAL 2 TIMES DAILY
Qty: 60 TABLET | Refills: 11 | Status: SHIPPED | OUTPATIENT
Start: 2025-06-10 | End: 2026-06-10

## 2025-06-11 ENCOUNTER — LAB VISIT (OUTPATIENT)
Dept: LAB | Facility: HOSPITAL | Age: 70
End: 2025-06-11
Attending: INTERNAL MEDICINE
Payer: MEDICARE

## 2025-06-11 DIAGNOSIS — D46.9 MYELODYSPLASTIC SYNDROME: ICD-10-CM

## 2025-06-11 DIAGNOSIS — Z76.82 STEM CELL TRANSPLANT CANDIDATE: ICD-10-CM

## 2025-06-11 LAB
ABSOLUTE NEUTROPHIL MANUAL (OHS): 0.8 K/UL
ALBUMIN SERPL BCP-MCNC: 3.1 G/DL (ref 3.5–5.2)
ALP SERPL-CCNC: 99 UNIT/L (ref 40–150)
ALT SERPL W/O P-5'-P-CCNC: 26 UNIT/L (ref 10–44)
ANION GAP (OHS): 7 MMOL/L (ref 8–16)
ANISOCYTOSIS BLD QL SMEAR: SLIGHT
AST SERPL-CCNC: 19 UNIT/L (ref 11–45)
BASO STIPL BLD QL SMEAR: ABNORMAL
BILIRUB SERPL-MCNC: 0.7 MG/DL (ref 0.1–1)
BUN SERPL-MCNC: 34 MG/DL (ref 8–23)
CALCIUM SERPL-MCNC: 9.3 MG/DL (ref 8.7–10.5)
CHLORIDE SERPL-SCNC: 109 MMOL/L (ref 95–110)
CO2 SERPL-SCNC: 27 MMOL/L (ref 23–29)
CREAT SERPL-MCNC: 1.2 MG/DL (ref 0.5–1.4)
DHEA SERPL-MCNC: NORMAL
EOSINOPHIL NFR BLD MANUAL: 2 % (ref 0–8)
ERYTHROCYTE [DISTWIDTH] IN BLOOD BY AUTOMATED COUNT: 17.2 % (ref 11.5–14.5)
ESTROGEN SERPL-MCNC: NORMAL PG/ML
GFR SERPLBLD CREATININE-BSD FMLA CKD-EPI: >60 ML/MIN/1.73/M2
GLUCOSE SERPL-MCNC: 156 MG/DL (ref 70–110)
HCT VFR BLD AUTO: 28.3 % (ref 40–54)
HGB BLD-MCNC: 9.2 GM/DL (ref 14–18)
HYPOCHROMIA BLD QL SMEAR: ABNORMAL
INSULIN SERPL-ACNC: NORMAL U[IU]/ML
LAB AP CLINICAL INFORMATION: NORMAL
LAB AP GROSS DESCRIPTION: NORMAL
LAB AP PERFORMING LOCATION(S): NORMAL
LAB FLOW CYTOMETRY ANTIBODIES ANALYZED (OHS): NORMAL
LAB FLOW CYTOMETRY COMMENT (OHS): NORMAL
LAB FLOW CYTOMETRY INTERPRETATION (OHS): NORMAL
LABORATORY COMMENT REPORT: NORMAL
LDH SERPL-CCNC: 306 U/L (ref 110–260)
LYMPHOCYTES NFR BLD MANUAL: 32 % (ref 18–48)
M DNA/RNA EXTRACT AND HOLD RESULT: NORMAL
M DNA/RNA EXTRACTION: NORMAL
MAGNESIUM SERPL-MCNC: 1.7 MG/DL (ref 1.6–2.6)
MCH RBC QN AUTO: 39.5 PG (ref 27–31)
MCHC RBC AUTO-ENTMCNC: 32.5 G/DL (ref 32–36)
MCV RBC AUTO: 122 FL (ref 82–98)
MONOCYTES NFR BLD MANUAL: 2 % (ref 4–15)
NEUTROPHILS NFR BLD MANUAL: 64 % (ref 38–73)
NUCLEATED RBC (/100WBC) (OHS): 2 /100 WBC
OVALOCYTES BLD QL SMEAR: ABNORMAL
PHOSPHATE SERPL-MCNC: 3.7 MG/DL (ref 2.7–4.5)
PLATELET # BLD AUTO: 24 K/UL (ref 150–450)
PLATELET BLD QL SMEAR: ABNORMAL
PMV BLD AUTO: ABNORMAL FL
POIKILOCYTOSIS BLD QL SMEAR: SLIGHT
POLYCHROMASIA BLD QL SMEAR: ABNORMAL
POTASSIUM SERPL-SCNC: 4.7 MMOL/L (ref 3.5–5.1)
PROT SERPL-MCNC: 5.2 GM/DL (ref 6–8.4)
RBC # BLD AUTO: 2.33 M/UL (ref 4.6–6.2)
SODIUM SERPL-SCNC: 143 MMOL/L (ref 136–145)
SPECIMEN SOURCE: NORMAL
T3RU NFR SERPL: NORMAL %
URATE SERPL-MCNC: 3.3 MG/DL (ref 3.4–7)
WBC # BLD AUTO: 1.31 K/UL (ref 3.9–12.7)

## 2025-06-11 PROCEDURE — 83615 LACTATE (LD) (LDH) ENZYME: CPT

## 2025-06-11 PROCEDURE — 84550 ASSAY OF BLOOD/URIC ACID: CPT

## 2025-06-11 PROCEDURE — 36415 COLL VENOUS BLD VENIPUNCTURE: CPT

## 2025-06-11 PROCEDURE — 84100 ASSAY OF PHOSPHORUS: CPT

## 2025-06-11 PROCEDURE — 87799 DETECT AGENT NOS DNA QUANT: CPT

## 2025-06-11 PROCEDURE — 80197 ASSAY OF TACROLIMUS: CPT

## 2025-06-11 PROCEDURE — 83735 ASSAY OF MAGNESIUM: CPT

## 2025-06-11 PROCEDURE — 82040 ASSAY OF SERUM ALBUMIN: CPT

## 2025-06-11 PROCEDURE — 85025 COMPLETE CBC W/AUTO DIFF WBC: CPT

## 2025-06-12 ENCOUNTER — RESULTS FOLLOW-UP (OUTPATIENT)
Dept: HEMATOLOGY/ONCOLOGY | Facility: CLINIC | Age: 70
End: 2025-06-12

## 2025-06-12 LAB
CYTOMEGALOVIRUS DNA, QUAL (OHS): NOT DETECTED
EPSTEIN-BARR VIRUS DNA, QUAL (OHS): NORMAL
PATH REPORT.FINAL DX SPEC: NORMAL
SPECIMEN SOURCE: NORMAL
TACROLIMUS BLD-MCNC: 7.8 NG/ML (ref 5–15)

## 2025-06-12 NOTE — PROGRESS NOTES
BMT Pharmacist Immunosuppression Note:    Reviewed patient's tacrolimus level with Dr. Hickey and it is within goal range.      Current regimen: 0.5mg BID  Capsule size(s): 0.5mg    Plan: Continue current regimen.        Lab Results   Component Value Date    TACROLIMUS 7.8 06/11/2025    TACROLIMUS 6.9 06/04/2025    TACROLIMUS 7.9 05/28/2025       Creatinine   Date Value Ref Range Status   06/11/2025 1.2 0.5 - 1.4 mg/dL Final   06/04/2025 1.1 0.5 - 1.4 mg/dL Final   05/28/2025 1.0 0.5 - 1.4 mg/dL Final     Total Bilirubin   Date Value Ref Range Status   03/17/2025 0.5 0.1 - 1.0 mg/dL Final     Comment:     For infants and newborns, interpretation of results should be based  on gestational age, weight and in agreement with clinical  observations.    Premature Infant recommended reference ranges:  Up to 24 hours.............<8.0 mg/dL  Up to 48 hours............<12.0 mg/dL  3-5 days..................<15.0 mg/dL  6-29 days.................<15.0 mg/dL     03/10/2025 0.4 0.1 - 1.0 mg/dL Final     Comment:     For infants and newborns, interpretation of results should be based  on gestational age, weight and in agreement with clinical  observations.    Premature Infant recommended reference ranges:  Up to 24 hours.............<8.0 mg/dL  Up to 48 hours............<12.0 mg/dL  3-5 days..................<15.0 mg/dL  6-29 days.................<15.0 mg/dL     03/03/2025 0.5 0.1 - 1.0 mg/dL Final     Comment:     For infants and newborns, interpretation of results should be based  on gestational age, weight and in agreement with clinical  observations.    Premature Infant recommended reference ranges:  Up to 24 hours.............<8.0 mg/dL  Up to 48 hours............<12.0 mg/dL  3-5 days..................<15.0 mg/dL  6-29 days.................<15.0 mg/dL       Bilirubin Total   Date Value Ref Range Status   06/11/2025 0.7 0.1 - 1.0 mg/dL Final     Comment:     For infants and newborns, interpretation of results should be based    on gestational age, weight and in agreement with clinical   observations.    Premature Infant recommended reference ranges:   0-24 hours:  <8.0 mg/dL   24-48 hours: <12.0 mg/dL   3-5 days:    <15.0 mg/dL   6-29 days:   <15.0 mg/dL   06/04/2025 1.0 0.1 - 1.0 mg/dL Final     Comment:     For infants and newborns, interpretation of results should be based   on gestational age, weight and in agreement with clinical   observations.    Premature Infant recommended reference ranges:   0-24 hours:  <8.0 mg/dL   24-48 hours: <12.0 mg/dL   3-5 days:    <15.0 mg/dL   6-29 days:   <15.0 mg/dL   05/28/2025 0.9 0.1 - 1.0 mg/dL Final     Comment:     For infants and newborns, interpretation of results should be based   on gestational age, weight and in agreement with clinical   observations.    Premature Infant recommended reference ranges:   0-24 hours:  <8.0 mg/dL   24-48 hours: <12.0 mg/dL   3-5 days:    <15.0 mg/dL   6-29 days:   <15.0 mg/dL     AST   Date Value Ref Range Status   06/11/2025 19 11 - 45 unit/L Final   06/04/2025 20 11 - 45 unit/L Final   05/28/2025 22 11 - 45 unit/L Final   03/17/2025 31 10 - 40 U/L Final   03/10/2025 23 10 - 40 U/L Final   03/03/2025 26 10 - 40 U/L Final     ALT   Date Value Ref Range Status   06/11/2025 26 10 - 44 unit/L Final   06/04/2025 26 10 - 44 unit/L Final   05/28/2025 22 10 - 44 unit/L Final   03/17/2025 35 10 - 44 U/L Final   03/10/2025 31 10 - 44 U/L Final   03/03/2025 36 10 - 44 U/L Final             Sanjuana Poe, Pharm.D., BCOP  Clinical Pharmacy Specialist, Bone Marrow Transplant/Cellular Therapy  [facility]  SpectraLink: [phone]

## 2025-06-17 ENCOUNTER — OFFICE VISIT (OUTPATIENT)
Dept: HEMATOLOGY/ONCOLOGY | Facility: CLINIC | Age: 70
End: 2025-06-17
Payer: MEDICARE

## 2025-06-17 ENCOUNTER — CLINICAL SUPPORT (OUTPATIENT)
Dept: HEMATOLOGY/ONCOLOGY | Facility: CLINIC | Age: 70
End: 2025-06-17
Payer: MEDICARE

## 2025-06-17 VITALS
DIASTOLIC BLOOD PRESSURE: 57 MMHG | OXYGEN SATURATION: 97 % | WEIGHT: 146.69 LBS | TEMPERATURE: 98 F | HEIGHT: 68 IN | RESPIRATION RATE: 18 BRPM | HEART RATE: 89 BPM | SYSTOLIC BLOOD PRESSURE: 118 MMHG | BODY MASS INDEX: 22.23 KG/M2

## 2025-06-17 DIAGNOSIS — D61.818 PANCYTOPENIA: ICD-10-CM

## 2025-06-17 DIAGNOSIS — D53.9 NUTRITIONAL ANEMIA, UNSPECIFIED: ICD-10-CM

## 2025-06-17 DIAGNOSIS — R05.9 COUGH, UNSPECIFIED TYPE: ICD-10-CM

## 2025-06-17 DIAGNOSIS — D84.81 IMMUNODEFICIENCY DUE TO CONDITIONS CLASSIFIED ELSEWHERE: ICD-10-CM

## 2025-06-17 DIAGNOSIS — E53.8 B12 DEFICIENCY: Primary | ICD-10-CM

## 2025-06-17 DIAGNOSIS — D89.810 ACUTE GVHD: ICD-10-CM

## 2025-06-17 DIAGNOSIS — Z94.81 S/P ALLOGENEIC BONE MARROW TRANSPLANT: Primary | ICD-10-CM

## 2025-06-17 PROCEDURE — 99999 PR PBB SHADOW E&M-EST. PATIENT-LVL IV: CPT | Mod: PBBFAC,,, | Performed by: INTERNAL MEDICINE

## 2025-06-17 RX ORDER — CYANOCOBALAMIN 1000 UG/ML
1000 INJECTION, SOLUTION INTRAMUSCULAR; SUBCUTANEOUS
Status: DISCONTINUED | OUTPATIENT
Start: 2025-06-17 | End: 2025-06-17

## 2025-06-17 RX ORDER — CYANOCOBALAMIN 1000 UG/ML
1000 INJECTION, SOLUTION INTRAMUSCULAR; SUBCUTANEOUS
Status: COMPLETED | OUTPATIENT
Start: 2025-06-17 | End: 2025-06-17

## 2025-06-17 RX ORDER — BENZONATATE 200 MG/1
200 CAPSULE ORAL 3 TIMES DAILY PRN
Qty: 30 CAPSULE | Refills: 0 | Status: SHIPPED | OUTPATIENT
Start: 2025-06-17 | End: 2025-06-27

## 2025-06-17 RX ADMIN — CYANOCOBALAMIN 1000 MCG: 1000 INJECTION, SOLUTION INTRAMUSCULAR; SUBCUTANEOUS at 04:06

## 2025-06-18 ENCOUNTER — LAB VISIT (OUTPATIENT)
Dept: LAB | Facility: HOSPITAL | Age: 70
End: 2025-06-18
Attending: INTERNAL MEDICINE
Payer: MEDICARE

## 2025-06-18 ENCOUNTER — TELEPHONE (OUTPATIENT)
Dept: HEMATOLOGY/ONCOLOGY | Facility: CLINIC | Age: 70
End: 2025-06-18
Payer: MEDICARE

## 2025-06-18 DIAGNOSIS — D46.9 MYELODYSPLASTIC SYNDROME: ICD-10-CM

## 2025-06-18 DIAGNOSIS — Z76.82 STEM CELL TRANSPLANT CANDIDATE: ICD-10-CM

## 2025-06-18 LAB
ABSOLUTE NEUTROPHIL MANUAL (OHS): 0.4 K/UL
ALBUMIN SERPL BCP-MCNC: 3.2 G/DL (ref 3.5–5.2)
ALP SERPL-CCNC: 59 UNIT/L (ref 40–150)
ALT SERPL W/O P-5'-P-CCNC: 23 UNIT/L (ref 10–44)
ANION GAP (OHS): 6 MMOL/L (ref 8–16)
ANISOCYTOSIS BLD QL SMEAR: SLIGHT
AST SERPL-CCNC: 18 UNIT/L (ref 11–45)
BILIRUB SERPL-MCNC: 0.6 MG/DL (ref 0.1–1)
BUN SERPL-MCNC: 25 MG/DL (ref 8–23)
CALCIUM SERPL-MCNC: 8.6 MG/DL (ref 8.7–10.5)
CHLORIDE SERPL-SCNC: 106 MMOL/L (ref 95–110)
CO2 SERPL-SCNC: 27 MMOL/L (ref 23–29)
CREAT SERPL-MCNC: 1.2 MG/DL (ref 0.5–1.4)
EOSINOPHIL NFR BLD MANUAL: 4 % (ref 0–8)
ERYTHROCYTE [DISTWIDTH] IN BLOOD BY AUTOMATED COUNT: 16.8 % (ref 11.5–14.5)
GFR SERPLBLD CREATININE-BSD FMLA CKD-EPI: >60 ML/MIN/1.73/M2
GLUCOSE SERPL-MCNC: 163 MG/DL (ref 70–110)
HCT VFR BLD AUTO: 27.5 % (ref 40–54)
HGB BLD-MCNC: 8.9 GM/DL (ref 14–18)
INDIRECT COOMBS: NORMAL
LDH SERPL-CCNC: 284 U/L (ref 110–260)
LYMPHOCYTES NFR BLD MANUAL: 40 % (ref 18–48)
MAGNESIUM SERPL-MCNC: 1.7 MG/DL (ref 1.6–2.6)
MCH RBC QN AUTO: 39.7 PG (ref 27–31)
MCHC RBC AUTO-ENTMCNC: 32.4 G/DL (ref 32–36)
MCV RBC AUTO: 123 FL (ref 82–98)
MONOCYTES NFR BLD MANUAL: 10 % (ref 4–15)
NEUTROPHILS NFR BLD MANUAL: 46 % (ref 38–73)
NUCLEATED RBC (/100WBC) (OHS): 0 /100 WBC
PHOSPHATE SERPL-MCNC: 3.5 MG/DL (ref 2.7–4.5)
PLATELET # BLD AUTO: 25 K/UL (ref 150–450)
PLATELET BLD QL SMEAR: ABNORMAL
PMV BLD AUTO: 12.4 FL (ref 9.2–12.9)
POTASSIUM SERPL-SCNC: 4.6 MMOL/L (ref 3.5–5.1)
PROT SERPL-MCNC: 5.3 GM/DL (ref 6–8.4)
RBC # BLD AUTO: 2.24 M/UL (ref 4.6–6.2)
RH BLD: NORMAL
SODIUM SERPL-SCNC: 139 MMOL/L (ref 136–145)
SPECIMEN OUTDATE: NORMAL
URATE SERPL-MCNC: 3.8 MG/DL (ref 3.4–7)
WBC # BLD AUTO: 0.85 K/UL (ref 3.9–12.7)

## 2025-06-18 PROCEDURE — 84100 ASSAY OF PHOSPHORUS: CPT

## 2025-06-18 PROCEDURE — 87799 DETECT AGENT NOS DNA QUANT: CPT

## 2025-06-18 PROCEDURE — 83615 LACTATE (LD) (LDH) ENZYME: CPT

## 2025-06-18 PROCEDURE — 36415 COLL VENOUS BLD VENIPUNCTURE: CPT

## 2025-06-18 PROCEDURE — 83735 ASSAY OF MAGNESIUM: CPT

## 2025-06-18 PROCEDURE — 80197 ASSAY OF TACROLIMUS: CPT

## 2025-06-18 PROCEDURE — 84550 ASSAY OF BLOOD/URIC ACID: CPT

## 2025-06-18 PROCEDURE — 86900 BLOOD TYPING SEROLOGIC ABO: CPT | Performed by: INTERNAL MEDICINE

## 2025-06-18 PROCEDURE — 82565 ASSAY OF CREATININE: CPT

## 2025-06-18 PROCEDURE — 85007 BL SMEAR W/DIFF WBC COUNT: CPT

## 2025-06-19 ENCOUNTER — RESULTS FOLLOW-UP (OUTPATIENT)
Dept: HEMATOLOGY/ONCOLOGY | Facility: CLINIC | Age: 70
End: 2025-06-19

## 2025-06-19 LAB
CYTOMEGALOVIRUS DNA, QUAL (OHS): NOT DETECTED
EPSTEIN-BARR VIRUS DNA, QUAL (OHS): NORMAL
TACROLIMUS BLD-MCNC: 8.5 NG/ML (ref 5–15)

## 2025-06-19 NOTE — PROGRESS NOTES
BMT Pharmacist Immunosuppression Note:    Reviewed patient's tacrolimus level with Dr. Hickey and it is within goal range.      Current regimen: 0.5mg BID  Capsule size(s): 0.5mg    Plan: Continue current regimen.        Lab Results   Component Value Date    TACROLIMUS 8.5 06/18/2025    TACROLIMUS 7.8 06/11/2025    TACROLIMUS 6.9 06/04/2025       Creatinine   Date Value Ref Range Status   06/18/2025 1.2 0.5 - 1.4 mg/dL Final   06/11/2025 1.2 0.5 - 1.4 mg/dL Final   06/04/2025 1.1 0.5 - 1.4 mg/dL Final     Total Bilirubin   Date Value Ref Range Status   03/17/2025 0.5 0.1 - 1.0 mg/dL Final     Comment:     For infants and newborns, interpretation of results should be based  on gestational age, weight and in agreement with clinical  observations.    Premature Infant recommended reference ranges:  Up to 24 hours.............<8.0 mg/dL  Up to 48 hours............<12.0 mg/dL  3-5 days..................<15.0 mg/dL  6-29 days.................<15.0 mg/dL     03/10/2025 0.4 0.1 - 1.0 mg/dL Final     Comment:     For infants and newborns, interpretation of results should be based  on gestational age, weight and in agreement with clinical  observations.    Premature Infant recommended reference ranges:  Up to 24 hours.............<8.0 mg/dL  Up to 48 hours............<12.0 mg/dL  3-5 days..................<15.0 mg/dL  6-29 days.................<15.0 mg/dL     03/03/2025 0.5 0.1 - 1.0 mg/dL Final     Comment:     For infants and newborns, interpretation of results should be based  on gestational age, weight and in agreement with clinical  observations.    Premature Infant recommended reference ranges:  Up to 24 hours.............<8.0 mg/dL  Up to 48 hours............<12.0 mg/dL  3-5 days..................<15.0 mg/dL  6-29 days.................<15.0 mg/dL       Bilirubin Total   Date Value Ref Range Status   06/18/2025 0.6 0.1 - 1.0 mg/dL Final     Comment:     For infants and newborns, interpretation of results should be based    on gestational age, weight and in agreement with clinical   observations.    Premature Infant recommended reference ranges:   0-24 hours:  <8.0 mg/dL   24-48 hours: <12.0 mg/dL   3-5 days:    <15.0 mg/dL   6-29 days:   <15.0 mg/dL   06/11/2025 0.7 0.1 - 1.0 mg/dL Final     Comment:     For infants and newborns, interpretation of results should be based   on gestational age, weight and in agreement with clinical   observations.    Premature Infant recommended reference ranges:   0-24 hours:  <8.0 mg/dL   24-48 hours: <12.0 mg/dL   3-5 days:    <15.0 mg/dL   6-29 days:   <15.0 mg/dL   06/04/2025 1.0 0.1 - 1.0 mg/dL Final     Comment:     For infants and newborns, interpretation of results should be based   on gestational age, weight and in agreement with clinical   observations.    Premature Infant recommended reference ranges:   0-24 hours:  <8.0 mg/dL   24-48 hours: <12.0 mg/dL   3-5 days:    <15.0 mg/dL   6-29 days:   <15.0 mg/dL     AST   Date Value Ref Range Status   06/18/2025 18 11 - 45 unit/L Final   06/11/2025 19 11 - 45 unit/L Final   06/04/2025 20 11 - 45 unit/L Final   03/17/2025 31 10 - 40 U/L Final   03/10/2025 23 10 - 40 U/L Final   03/03/2025 26 10 - 40 U/L Final     ALT   Date Value Ref Range Status   06/18/2025 23 10 - 44 unit/L Final   06/11/2025 26 10 - 44 unit/L Final   06/04/2025 26 10 - 44 unit/L Final   03/17/2025 35 10 - 44 U/L Final   03/10/2025 31 10 - 44 U/L Final   03/03/2025 36 10 - 44 U/L Final             Sanjuana Poe, Pharm.D., BCOP  Clinical Pharmacy Specialist, Bone Marrow Transplant/Cellular Therapy  [facility]  SpectraLink: [phone]

## 2025-06-20 NOTE — PROGRESS NOTES
Section of Hematology and Stem Cell Transplantation    Post-Transplantation Follow Up Visit     Notes:      06/20/2025    Transplant History:   Primary oncologist: Paco Hickey MD  Primary oncologic diagnosis: MDS  Transplant date: 9/19/2024  Donor: haploidentical  Blood Type (Patient): B +  Blood Type (Donor): A +  CMV (Patient): Positive  CMV (Donor): Positive  Graft source: Bone marrow  CD34+ cell dose: 3.55x10^6  Conditioning Regimen: Fludarabine plus melphalan 100 mg/m2 + 2Gy TBI  GVHD prophylaxis: Post-transplant cyclophosphamide, Tacrolimus, MMF  Immediate post-transplant complications: Patient experienced expected GI toxicities with C diff negative diarrhea and nausea, neutropenic fever with negative infectious work up, expected cytopenias requiring transfusions, electrolyte abnormalities requiring replacement, volume overload requiring diuresis, intermittent SOB from pulmonary edema requiring 02, and a hemorrhoid flare up. Diarrhea and SOB improved towards the end of the hospital stay.     History of Present Ilness:   Guillaume Salinas (Guillaume) is a pleasant 69 y.o.male with a past medical history of MDS who is status post haploidentical stem cell transplantation conditioned with FluMel 100 + PTCy+ 2Gy TBI who is currently day +274  who presents for post-transplant follow up.    He is doing very well. No new nausea with tapering of prednisone. He has been eating well. He has some deconditioning but overall stable. Denies diarrhea and rash.      PAST MEDICAL HISTORY/:   Past Medical History:   Diagnosis Date    Anticoagulant long-term use     Coronary artery disease     Hypertension     Myelodysplastic syndrome     Peripheral vascular disease, unspecified        PAST SURGICAL HISTORY:   Past Surgical History:   Procedure Laterality Date    BONE MARROW BIOPSY Left 4/26/2023    Procedure: Biopsy-bone marrow;  Surgeon: Harry Diamond MD;  Location: [facility] OR;  Service: Oncology;  Laterality: Left;     COLONOSCOPY N/A 2022    Procedure: COLONOSCOPY Golytely Vaccinated will bring cards;  Surgeon: Dereje Simon MD;  Location: [facility];  Service: Endoscopy;  Laterality: N/A;  Do not cancel this order    ESOPHAGOGASTRODUODENOSCOPY N/A 2025    Procedure: EGD (ESOPHAGOGASTRODUODENOSCOPY);  Surgeon: Dudley Toth MD;  Location: [facility];  Service: Endoscopy;  Laterality: N/A;  ref by Gilma Ugalde, PA-C,portal-ae    INSERTION OF LEMONS CATHETER Right 2024    Procedure: INSERTION, CATHETER, CENTRAL VENOUS, LEMONS TRIPLE LUMEN;  Surgeon: Kg Patten MD;  Location: [facility] OR [address] [place] [address];  Service: General;  Laterality: Right;     PAST SOCIAL HISTORY:  Social History     Socioeconomic History    Marital status:    Tobacco Use    Smoking status: Former     Current packs/day: 0.00     Average packs/day: 0.3 packs/day for 50.0 years (12.5 ttl pk-yrs)     Types: Cigarettes     Start date: 3/1/1973     Quit date: 3/1/2023     Years since quittin.3     Passive exposure: Past    Smokeless tobacco: Never   Substance and Sexual Activity    Alcohol use: Not Currently    Drug use: Never    Sexual activity: Not Currently     Partners: Female     Social Drivers of Health     Financial Resource Strain: Patient Declined (2024)    Overall Financial Resource Strain (CARDIA)     Difficulty of Paying Living Expenses: Patient declined   Food Insecurity: Patient Declined (2024)    Hunger Vital Sign     Worried About Running Out of Food in the Last Year: Patient declined     Ran Out of Food in the Last Year: Patient declined   Transportation Needs: Patient Declined (2024)    TRANSPORTATION NEEDS     Transportation : Patient declined   Physical Activity: Inactive (1/10/2025)    Exercise Vital Sign     Days of Exercise per Week: 0 days     Minutes of Exercise per Session: 10 min   Stress: Patient Declined (2024)    [employer] [place] [employer] - Occupational  Stress Questionnaire     Feeling of Stress : Patient declined   Recent Concern: Stress - Stress Concern Present (9/30/2024)    [employer] [place] [employer] - Occupational Stress Questionnaire     Feeling of Stress : To some extent   Housing Stability: Patient Declined (12/28/2024)    Housing Stability Vital Sign     Unable to Pay for Housing in the Last Year: Patient declined     Homeless in the Last Year: Patient declined       FAMILY HISTORY:  Cancer-related family history includes Cancer in his brother and father.    CURRENT MEDICATIONS:   Medication List with Changes/Refills   New Medications    BENZONATATE (TESSALON) 200 MG CAPSULE    Take 1 capsule (200 mg total) by mouth 3 (three) times daily as needed for Cough.   Current Medications    ACYCLOVIR (ZOVIRAX) 800 MG TAB    Take 1 tablet (800 mg total) by mouth 2 (two) times daily.    ATOVAQUONE (MEPRON) 750 MG/5 ML SUSP ORAL LIQUID    Take 10 mLs (1,500 mg total) by mouth once daily.    BUDESONIDE (ENTOCORT EC) 3 MG CAPSULE    Take 1 capsule (3 mg total) by mouth 3 (three) times daily.    CARVEDILOL (COREG) 3.125 MG TABLET    Take 1 tablet (3.125 mg total) by mouth 2 (two) times daily.    CYANOCOBALAMIN 1,000 MCG/ML INJECTION    Inject 1 mL (1,000 mcg total) into the muscle once a week.    ELTROMBOPAG OLAMINE (PROMACTA) 50 MG TAB    Take 1 tablet (50 mg total) by mouth once daily.    FOLIC ACID (FOLVITE) 1 MG TABLET    Take 1 tablet (1 mg total) by mouth once daily.    GABAPENTIN (NEURONTIN) 300 MG CAPSULE    Take 2 capsules (600 mg total) by mouth nightly as needed (Restless leg).    HYDROCORTISONE 1 % CREAM    Apply to affected area 2 times daily    LETERMOVIR (PREVYMIS) 480 MG TAB    Take 1 tablet (480 mg total) by mouth Daily.    LEVOFLOXACIN (LEVAQUIN) 500 MG TABLET    Take 1 tablet (500 mg total) by mouth once daily.    LOPERAMIDE (IMODIUM A-D) 2 MG TAB    Take 2 mg by mouth 4 (four) times daily as needed.    LORAZEPAM (ATIVAN) 1 MG TABLET     Take 1 tablet (1 mg total) by mouth once. for 1 dose    MAGNESIUM OXIDE (MAG-OX) 400 MG (241.3 MG MAGNESIUM) TABLET    Take 2 tablets (800 mg total) by mouth 2 (two) times daily.    ONDANSETRON (ZOFRAN-ODT) 8 MG TBDL    Take 1 tablet (8 mg total) by mouth every 8 (eight) hours as needed (nausea).    PANTOPRAZOLE (PROTONIX) 40 MG TABLET    Take 1 tablet (40 mg total) by mouth once daily.    POSACONAZOLE (NOXAFIL) 100 MG TBEC TABLET    Take 3 tablets (300 mg total) by mouth once daily.    PREDNISONE (DELTASONE) 10 MG TABLET    Take 3 tablets (30 mg total) by mouth once daily.    ROPINIROLE (REQUIP) 0.5 MG TABLET    Take 1 tablet (0.5 mg total) by mouth every evening.    TACROLIMUS (PROGRAF) 0.5 MG CAP    Take 1 capsule (0.5 mg total) by mouth every morning AND 1 capsule (0.5 mg total) every evening.    TAMSULOSIN (FLOMAX) 0.4 MG CAP    Take 1 capsule (0.4 mg total) by mouth once daily.    UNABLE TO FIND    Take by mouth once daily. medication name: copper    ZOLPIDEM (AMBIEN) 5 MG TAB    Take 1 tablet (5 mg total) by mouth nightly as needed (insomnia).       ALLERGIES:   Review of patient's allergies indicates:  No Known Allergies    GVHD Review of Systems:     Pertinent positives and negatives included in the HPI. Otherwise a 14 point review of systems is negative. GVHD review of systems recorded in BMT flowsheet.     Physical Exam:     Vitals:    06/17/25 1514   BP: (!) 118/57   Pulse: 89   Resp: 18   Temp: 97.7 °F (36.5 °C)     General: Appears well, NAD.   HEENT: MMM, no OP lesions  Pulmonary: CTAB, no increased work of breathing, no W/R/C  Cardiovascular: S1S2 normal, RRR, no M/R/G  Abdominal: Soft, NT, ND, BS+, no HSM  Extremities: No C/C/E  Neurological: AAOx4, grossly normal, no focal deficits  Dermatologic: No rashes or lesions     ECOG Performance Status: (foot note - ECOG PS provided by Eastern Cooperative Oncology Group) 1 - Symptomatic but completely ambulatory    Karnofsky Performance Score:  80%- Normal  Activity with Effort: Some Symptoms of Disease    Labs:   Lab Results   Component Value Date    WBC 0.85 (LL) 06/18/2025    HGB 8.9 (L) 06/18/2025    HCT 27.5 (L) 06/18/2025     (H) 06/18/2025    PLT 25 (LL) 06/18/2025        CMP  Sodium   Date Value Ref Range Status   06/18/2025 139 136 - 145 mmol/L Final   03/17/2025 140 136 - 145 mmol/L Final     Potassium   Date Value Ref Range Status   06/18/2025 4.6 3.5 - 5.1 mmol/L Final   03/17/2025 4.9 3.5 - 5.1 mmol/L Final     Chloride   Date Value Ref Range Status   06/18/2025 106 95 - 110 mmol/L Final   03/17/2025 105 95 - 110 mmol/L Final     CO2   Date Value Ref Range Status   06/18/2025 27 23 - 29 mmol/L Final   03/17/2025 26 23 - 29 mmol/L Final     Glucose   Date Value Ref Range Status   06/18/2025 163 (H) 70 - 110 mg/dL Final   03/17/2025 109 70 - 110 mg/dL Final     BUN   Date Value Ref Range Status   06/18/2025 25 (H) 8 - 23 mg/dL Final     Creatinine   Date Value Ref Range Status   06/18/2025 1.2 0.5 - 1.4 mg/dL Final     Calcium   Date Value Ref Range Status   06/18/2025 8.6 (L) 8.7 - 10.5 mg/dL Final   03/17/2025 9.3 8.7 - 10.5 mg/dL Final     Protein Total   Date Value Ref Range Status   06/18/2025 5.3 (L) 6.0 - 8.4 gm/dL Final     Total Protein   Date Value Ref Range Status   03/17/2025 6.1 6.0 - 8.4 g/dL Final     Albumin   Date Value Ref Range Status   06/18/2025 3.2 (L) 3.5 - 5.2 g/dL Final   03/17/2025 3.5 3.5 - 5.2 g/dL Final     Total Bilirubin   Date Value Ref Range Status   03/17/2025 0.5 0.1 - 1.0 mg/dL Final     Comment:     For infants and newborns, interpretation of results should be based  on gestational age, weight and in agreement with clinical  observations.    Premature Infant recommended reference ranges:  Up to 24 hours.............<8.0 mg/dL  Up to 48 hours............<12.0 mg/dL  3-5 days..................<15.0 mg/dL  6-29 days.................<15.0 mg/dL       Bilirubin Total   Date Value Ref Range Status   06/18/2025 0.6 0.1 -  1.0 mg/dL Final     Comment:     For infants and newborns, interpretation of results should be based   on gestational age, weight and in agreement with clinical   observations.    Premature Infant recommended reference ranges:   0-24 hours:  <8.0 mg/dL   24-48 hours: <12.0 mg/dL   3-5 days:    <15.0 mg/dL   6-29 days:   <15.0 mg/dL     Alkaline Phosphatase   Date Value Ref Range Status   03/17/2025 66 40 - 150 U/L Final     ALP   Date Value Ref Range Status   06/18/2025 59 40 - 150 unit/L Final     AST   Date Value Ref Range Status   06/18/2025 18 11 - 45 unit/L Final   03/17/2025 31 10 - 40 U/L Final     ALT   Date Value Ref Range Status   06/18/2025 23 10 - 44 unit/L Final   03/17/2025 35 10 - 44 U/L Final     Anion Gap   Date Value Ref Range Status   06/18/2025 6 (L) 8 - 16 mmol/L Final     eGFR   Date Value Ref Range Status   06/18/2025 >60 >60 mL/min/1.73/m2 Final     Comment:     Estimated GFR calculated using the CKD-EPI creatinine (2021) equation.   03/17/2025 >60 >60 mL/min/1.73 m^2 Final          Imaging:   Hospital imaging reviewed.    Pathology:  Prior pathology reviewed.     Acute GVHD Scoring:  GVHD Acute Assessment- Charted  Skin: No skin acute Euvez-uhbiaf-Tybi Disease/rash not attributable to acute Aeubj-oqyyqp-Hdzc Disease  Other Clinical Organ Involvement: No  Overall Grade (Przepiorka): 0 - No stage 1-4 of any organ.      Assessment and Plan:   Guillaume Salinas (Guillaume) is a pleasant 69 y.o.male with a past medical history of MDS s/p haplo SCT who presents for post-transplant follow up.    MDS: Status post treatment with azacitidine 75 mg/m2 daily x7 days plus venetoclax 100mg (voriconazole) daily x14 of 28 days.Venetoclax decreased to 7 days with cycle 3 until completion of 11 cycles. No plans for maintenance at this time.     Status post allogeneic stem cell transplantation: As noted above, status post haploidentical stem cell transplantation conditioned with FluMel 100 + PTCy+ 2Gy  TBI . Currently Day+ 274. Engrafted on 10/09/24 day +20.  Day 30 bone marrow (11/5/2024) showing mildly hypercellular marrow with no increased blast (0.3%) and no evidence of myeloid neoplasm. Pending chimerisms, NGS, and CG  Day 100 bone marrow biopsy on 12/31/24 showed 30-40% cellularity, erythroid hyperplasia, dyserythropoiesis, decreased megakaryocytes with atypical morphology. No hemophagocytosis mentioned. NGS, FISH, and CG normal. 100% chimerisms.   Bone marrow biopsy (6/9/25) revealed mildly hypercellular marrow with dyserythropoiesis. CG normal. NGS negative. Chimerisms 100% CD33/CD3. He remains in remission.     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 On 4/23/25 he developed persistent nausea and appetite suppression; Budesonide 3mg TID started again. 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 Prednisone 0.5 mg/kg started on 5/21/25. His symptoms resolved with oral steroids. Steroid taper below.   A. Current tacro dose: 0.5 mg BID  B. Last tacro level: 8.5  C. Adjustments: N/a    Prednisone taper  5/28 - 6/4 Take 2 tablets (20mg) once a day  6/5 - 6/11 Take 1 tablet (10mg) once a day  6/12 - 6/18 Take ½ tablet (5mg) once a day  6/19 - 6/25 Take ½ tablet every other day  6/26 STOP     Immunosuppression: Prevention with posaconazole, acyclovir. CMV prophylaxis with letermovir. PJP prophyalxis atovaquone. Continue weekly monitoring of CMV and EBV.  Last CMV: Not-detected  last EBV: negative  Active infections: N/A    Pancytopenia: He has had poor graft function since transplant likely due to multiple issues. He was both folate, copper, and B12 deficient, and he is currently taking supplementation. There was concern for possible HLH. Bactrim was changed to atovaquone. He started Promacta in 5/2025, and his dose was recently increased to 100mg daily. At some point in the future, he may need a CD34 selected  boost. Will recheck levels next labs. If deficiencies have normalized and aGVHD symptoms remain absent, will consider boost at that point.   Folic Acid deficiency: He remains on folic acid supplementation. His folate levels are now normal. At next visit, ok to stop folic acid supplementation.   Copper deficiency: He remains on copper gluconate 2mg daily. At next visit, ok to stop copper as the level normalized.   B12 deficiency: Continue B12 monthly.     Post transplant lymphoproliferative disorder: Patient received rituxan on 12/30/24 due to possible PTLD with elevated EBV and possible HLH. EBV remains negative.     Hypomagnesemia: Continue MagOx to 800mg twice daily.      Anxiety, Restless Leg Syndrome: Noted since discharge by family. He started ropinirole 0.5mg and has experienced significant improvement. Continue Ropinirole    Insomnia: Patient now sleeping well and only waking up to urinate at night. Continue Ambien and Ropinirole for RLS.      Blood pressure abnormalities: For awhile patient had been holding off of his carvedilol due to hypotensions and dizziness. His pressures at home have been averaging 170s/90s so he started the carvedilol 6.25mg BID again. He began to complain of orthostatic hypotension symptoms when he takes the medication and BP in clinic is 105/55. His carvedilol was lowered to 3.125 daily. Today his blood pressure is normotensive. For now continue carvedilol 3.125mg BID and will monitor and make adjustments as needed.    Benign prostate hyperplasia: He saw urology 5/5/25 and was started on flomax for BPH.    Orders Placed:      Orders Placed This Encounter    Ferritin    Haptoglobin    Lactate Dehydrogenase    Vitamin B12    benzonatate (TESSALON) 200 MG capsule         Follow Up:       BMT Chart Routing      Follow up with physician 2 weeks. 2-3 weeks Paco or Gilma independent   Follow up with [facility]    Provider visit type    Infusion scheduling note    Injection scheduling note     Labs CBC, CMP, CMV, EBV, tacrolimus level, phosphorus, magnesium, ferritin, vitamin B12 and hemolysis panel   Scheduling:  Preferred lab:  Lab interval: once a week  routine labs weekly (CBC CMP Mg Phos EBV CMV tacro). With next labs, please include ferritin, B12, LDH, hapto   Imaging    Pharmacy appointment    Other referrals                     A total of 40 minutes was spent in pre-visit chart review, personal interpretation of labs and imaging, and medication review. Total visit time 35 minutes, >50 % counseling.     Paco Hickey MD  Malignant Hematology, Stem Cell Transplant, and Cellular Therapy  [facility]  [facility] [place]

## 2025-06-23 ENCOUNTER — OFFICE VISIT (OUTPATIENT)
Dept: UROLOGY | Facility: CLINIC | Age: 70
End: 2025-06-23
Payer: MEDICARE

## 2025-06-23 VITALS
SYSTOLIC BLOOD PRESSURE: 115 MMHG | WEIGHT: 145.06 LBS | BODY MASS INDEX: 21.99 KG/M2 | DIASTOLIC BLOOD PRESSURE: 77 MMHG | HEART RATE: 99 BPM | HEIGHT: 68 IN

## 2025-06-23 DIAGNOSIS — R35.0 URINARY FREQUENCY: Primary | ICD-10-CM

## 2025-06-23 DIAGNOSIS — N40.1 BPH WITH OBSTRUCTION/LOWER URINARY TRACT SYMPTOMS: ICD-10-CM

## 2025-06-23 DIAGNOSIS — N13.8 BPH WITH OBSTRUCTION/LOWER URINARY TRACT SYMPTOMS: ICD-10-CM

## 2025-06-23 PROCEDURE — 1159F MED LIST DOCD IN RCRD: CPT | Mod: CPTII,S$GLB,, | Performed by: UROLOGY

## 2025-06-23 PROCEDURE — 1160F RVW MEDS BY RX/DR IN RCRD: CPT | Mod: CPTII,S$GLB,, | Performed by: UROLOGY

## 2025-06-23 PROCEDURE — 3008F BODY MASS INDEX DOCD: CPT | Mod: CPTII,S$GLB,, | Performed by: UROLOGY

## 2025-06-23 PROCEDURE — 99214 OFFICE O/P EST MOD 30 MIN: CPT | Mod: S$GLB,,, | Performed by: UROLOGY

## 2025-06-23 PROCEDURE — 99999 PR PBB SHADOW E&M-EST. PATIENT-LVL IV: CPT | Mod: PBBFAC,,, | Performed by: UROLOGY

## 2025-06-23 PROCEDURE — 1126F AMNT PAIN NOTED NONE PRSNT: CPT | Mod: CPTII,S$GLB,, | Performed by: UROLOGY

## 2025-06-23 PROCEDURE — 3074F SYST BP LT 130 MM HG: CPT | Mod: CPTII,S$GLB,, | Performed by: UROLOGY

## 2025-06-23 PROCEDURE — 3078F DIAST BP <80 MM HG: CPT | Mod: CPTII,S$GLB,, | Performed by: UROLOGY

## 2025-06-23 PROCEDURE — 1101F PT FALLS ASSESS-DOCD LE1/YR: CPT | Mod: CPTII,S$GLB,, | Performed by: UROLOGY

## 2025-06-23 PROCEDURE — G2211 COMPLEX E/M VISIT ADD ON: HCPCS | Mod: S$GLB,,, | Performed by: UROLOGY

## 2025-06-23 PROCEDURE — 3288F FALL RISK ASSESSMENT DOCD: CPT | Mod: CPTII,S$GLB,, | Performed by: UROLOGY

## 2025-06-23 NOTE — PROGRESS NOTES
[place] - Urology   Clinic Note    SUBJECTIVE:     Chief Complaint   Patient presents with    Post-op Evaluation     1mth f/u BPH       Referral from: No ref. provider found.    History of Present Illness:  Guillaume Salinas is a 69 y.o. male who presents to clinic for lower urinary tract symptoms and BPH.    Reports increased frequency 10-15x per day. Nocturia x5 per night. Currently undergoing active treatment for leukemia    Previous medications for BPH include: no  AUA Symptom Score: 23/6  Previous prostatic surgery: No    CT shows prostate 43cc, significant bladder hypertrophy  PSA 2.6    06/23/2025  Doing well on flomax  Symptoms much improved  IPSS 9/2  Overall very happy    Patient endorses no additional complaints at this time.    Past Medical History:   Diagnosis Date    Anticoagulant long-term use     Coronary artery disease     Hypertension     Myelodysplastic syndrome     Peripheral vascular disease, unspecified        Past Surgical History:   Procedure Laterality Date    BONE MARROW BIOPSY Left 4/26/2023    Procedure: Biopsy-bone marrow;  Surgeon: Harry Diamond MD;  Location: [facility];  Service: Oncology;  Laterality: Left;    COLONOSCOPY N/A 01/05/2022    Procedure: COLONOSCOPY Golytely Vaccinated will bring cards;  Surgeon: Dereje Simon MD;  Location: [facility];  Service: Endoscopy;  Laterality: N/A;  Do not cancel this order    ESOPHAGOGASTRODUODENOSCOPY N/A 5/8/2025    Procedure: EGD (ESOPHAGOGASTRODUODENOSCOPY);  Surgeon: Dudley Toth MD;  Location: [facility];  Service: Endoscopy;  Laterality: N/A;  ref by Gilma Ugalde, PA-C,portal-ae    INSERTION OF LEMONS CATHETER Right 9/13/2024    Procedure: INSERTION, CATHETER, CENTRAL VENOUS, LEMONS TRIPLE LUMEN;  Surgeon: Kg Patten MD;  Location: [address];  Service: General;  Laterality: Right;       Family History   Problem Relation Name Age of Onset    Hypertension Mother      Cancer Father      Cancer  "Brother 9     No Known Problems Daughter      No Known Problems Daughter      No Known Problems Son         Social History[1]    Medications Ordered Prior to Encounter[2]    Review of patient's allergies indicates:  No Known Allergies    Review of Systems:  A review of 10+ systems was conducted with pertinent positive and negative findings documented in HPI with all other systems reviewed and negative.    OBJECTIVE:     Estimated body mass index is 22.06 kg/m² as calculated from the following:    Height as of this encounter: 5' 8" (1.727 m).    Weight as of this encounter: 65.8 kg (145 lb 1 oz).    Vital Signs (Most Recent)  Vitals:    06/23/25 1018   BP: 115/77   Pulse: 99       Physical Exam:  GENERAL: patient sitting comfortably  HEENT: normocephalic  NECK: supple, no JVD  PULM: normal chest rise, no increased WOB  HEART: non-diaphoretic  ABDO: soft, nondistended, nontender  BACK: no CVA tenderness bilaterally  SKIN: warm, dry, well perfused  EXT: no bruising or edema  NEURO: grossly normal with no focal deficits  PSYCH: appropriate mood and affect    Genitourinary Exam:  deferred    Lab Results   Component Value Date    BUN 25 (H) 06/18/2025    CREATININE 1.2 06/18/2025    WBC 0.85 (LL) 06/18/2025    HGB 8.9 (L) 06/18/2025    HCT 27.5 (L) 06/18/2025    PLT 25 (LL) 06/18/2025    AST 18 06/18/2025    ALT 23 06/18/2025    ALKPHOS 59 06/18/2025    ALBUMIN 3.2 (L) 06/18/2025    HGBA1C 5.2 10/19/2021    INR 1.0 06/04/2025        Imaging:  I have personally reviewed all relevant imaging studies.    No results found. However, due to the size of the patient record, not all encounters were searched. Please check Results Review for a complete set of results.  No results found. However, due to the size of the patient record, not all encounters were searched. Please check Results Review for a complete set of results.  US Abdomen Limited  Narrative: EXAMINATION:  US ABDOMEN LIMITED    CLINICAL HISTORY:  RUQ Ultrasound to " assess gall bladder. Elevated LFTS;    TECHNIQUE:  Limited ultrasound of the right upper quadrant of the abdomen (including pancreas, liver, gallbladder, common bile duct, and spleen) was performed.    COMPARISON:  CT chest abdomen pelvis 12/27/2024.    FINDINGS:  Pancreas: The visualized portions of pancreas appear normal.    Liver: Enlarged measuring 19.2 cm. Diffusely increased echogenicity of the liver parenchyma suggestive of steatosis.  No focal hepatic lesions.    Gallbladder: Gallbladder sludge.  No gallstones identified.  Mild gallbladder wall thickening, nonspecific, without hyperemia.  Technologist reports negative sonographic Luis's sign.    Biliary system: The common duct is not dilated, measuring 5 mm.  No intrahepatic ductal dilatation.    Spleen: Enlarged measuring 12.8 x 5.0 cm with normal echotexture.    IVC: Normal.    Miscellaneous: No upper abdominal ascites.  Impression: 1. Gallbladder sludge and nonspecific gallbladder wall thickening.  No findings to suggest acute cholecystitis.  2. Hepatomegaly with diffuse steatosis.  3. Splenomegaly.    Electronically signed by resident: Vignesh Amos  Date:    12/28/2024  Time:    10:58    Electronically signed by: Landen Presley MD  Date:    12/28/2024  Time:    11:39       PSA:  Lab Results   Component Value Date    PSA 2.6 08/27/2024    PSA 4.1 (H) 02/02/2024    PSA 1.4 06/07/2023    PSA 2.5 10/19/2021       Testosterone:  Lab Results   Component Value Date    TOTALTESTOST 964 01/24/2025        ASSESSMENT     1. Urinary frequency    2. BPH with obstruction/lower urinary tract symptoms        PLAN:     Doing well, improved on flomax  Can cont with medical therapy for BPH  Follow up one year  Can cont with PSA annually, will repeat next year     - Visit today included increased complexity associated with the ongoing care of the episodic problem BPH which has been addressed and requires the longitudinal care of the patient due to the serious and/or  complex managed problem of BPH.     Srikanth Ledbetter MD  Urology  [facility]    Disclaimer: This note has been generated using voice-recognition software. There may be typographical errors that have been missed during proof-reading.            [1]   Social History  Tobacco Use    Smoking status: Former     Current packs/day: 0.00     Average packs/day: 0.3 packs/day for 50.0 years (12.5 ttl pk-yrs)     Types: Cigarettes     Start date: 3/1/1973     Quit date: 3/1/2023     Years since quittin.3     Passive exposure: Past    Smokeless tobacco: Never   Substance Use Topics    Alcohol use: Not Currently    Drug use: Never   [2]   Current Outpatient Medications on File Prior to Visit   Medication Sig Dispense Refill    acyclovir (ZOVIRAX) 800 MG Tab Take 1 tablet (800 mg total) by mouth 2 (two) times daily. 60 tablet 11    atovaquone (MEPRON) 750 mg/5 mL Susp oral liquid Take 10 mLs (1,500 mg total) by mouth once daily. 210 mL 2    benzonatate (TESSALON) 200 MG capsule Take 1 capsule (200 mg total) by mouth 3 (three) times daily as needed for Cough. 30 capsule 0    budesonide (ENTOCORT EC) 3 mg capsule Take 1 capsule (3 mg total) by mouth 3 (three) times daily. 90 capsule 2    carvediloL (COREG) 3.125 MG tablet Take 1 tablet (3.125 mg total) by mouth 2 (two) times daily. 60 tablet 11    cyanocobalamin 1,000 mcg/mL injection Inject 1 mL (1,000 mcg total) into the muscle once a week. 10 mL 2    eltrombopag olamine (PROMACTA) 50 MG Tab Take 1 tablet (50 mg total) by mouth once daily. 30 tablet 11    folic acid (FOLVITE) 1 MG tablet Take 1 tablet (1 mg total) by mouth once daily. 100 tablet 3    gabapentin (NEURONTIN) 300 MG capsule Take 2 capsules (600 mg total) by mouth nightly as needed (Restless leg). 60 capsule 2    hydrocortisone 1 % cream Apply to affected area 2 times daily 30 g 1    letermovir (PREVYMIS) 480 mg Tab Take 1 tablet (480 mg total) by mouth Daily. 28 tablet 9    levoFLOXacin  (LEVAQUIN) 500 MG tablet Take 1 tablet (500 mg total) by mouth once daily. 30 tablet 5    loperamide (IMODIUM A-D) 2 mg Tab Take 2 mg by mouth 4 (four) times daily as needed.      magnesium oxide (MAG-OX) 400 mg (241.3 mg magnesium) tablet Take 2 tablets (800 mg total) by mouth 2 (two) times daily. 120 tablet 11    ondansetron (ZOFRAN-ODT) 8 MG TbDL Take 1 tablet (8 mg total) by mouth every 8 (eight) hours as needed (nausea). 30 tablet 1    pantoprazole (PROTONIX) 40 MG tablet Take 1 tablet (40 mg total) by mouth once daily. 30 tablet 3    posaconazole (NOXAFIL) 100 mg TbEC tablet Take 3 tablets (300 mg total) by mouth once daily. 90 tablet 11    rOPINIRole (REQUIP) 0.5 MG tablet Take 1 tablet (0.5 mg total) by mouth every evening. 30 tablet 11    tacrolimus (PROGRAF) 0.5 MG Cap Take 1 capsule (0.5 mg total) by mouth every morning AND 1 capsule (0.5 mg total) every evening. 90 capsule 3    tamsulosin (FLOMAX) 0.4 mg Cap Take 1 capsule (0.4 mg total) by mouth once daily. 30 capsule 11    UNABLE TO FIND Take by mouth once daily. medication name: copper      zolpidem (AMBIEN) 5 MG Tab Take 1 tablet (5 mg total) by mouth nightly as needed (insomnia). 30 tablet 0    LORazepam (ATIVAN) 1 MG tablet Take 1 tablet (1 mg total) by mouth once. for 1 dose 1 tablet 0     No current facility-administered medications on file prior to visit.

## 2025-06-25 ENCOUNTER — LAB VISIT (OUTPATIENT)
Dept: LAB | Facility: HOSPITAL | Age: 70
End: 2025-06-25
Attending: INTERNAL MEDICINE
Payer: MEDICARE

## 2025-06-25 DIAGNOSIS — D53.9 NUTRITIONAL ANEMIA, UNSPECIFIED: ICD-10-CM

## 2025-06-25 DIAGNOSIS — D64.9 SYMPTOMATIC ANEMIA: ICD-10-CM

## 2025-06-25 DIAGNOSIS — Z76.82 STEM CELL TRANSPLANT CANDIDATE: ICD-10-CM

## 2025-06-25 DIAGNOSIS — D46.9 MYELODYSPLASTIC SYNDROME: ICD-10-CM

## 2025-06-25 DIAGNOSIS — D61.818 PANCYTOPENIA: ICD-10-CM

## 2025-06-25 LAB
ABSOLUTE NEUTROPHIL MANUAL (OHS): 0.5 K/UL
ALBUMIN SERPL BCP-MCNC: 3.3 G/DL (ref 3.5–5.2)
ALP SERPL-CCNC: 56 UNIT/L (ref 40–150)
ALT SERPL W/O P-5'-P-CCNC: 19 UNIT/L (ref 10–44)
ANION GAP (OHS): 8 MMOL/L (ref 8–16)
ANISOCYTOSIS BLD QL SMEAR: SLIGHT
AST SERPL-CCNC: 20 UNIT/L (ref 11–45)
BILIRUB SERPL-MCNC: 0.5 MG/DL (ref 0.1–1)
BUN SERPL-MCNC: 22 MG/DL (ref 8–23)
CALCIUM SERPL-MCNC: 9 MG/DL (ref 8.7–10.5)
CHLORIDE SERPL-SCNC: 108 MMOL/L (ref 95–110)
CO2 SERPL-SCNC: 26 MMOL/L (ref 23–29)
CREAT SERPL-MCNC: 1.4 MG/DL (ref 0.5–1.4)
DACRYOCYTES BLD QL SMEAR: ABNORMAL
ERYTHROCYTE [DISTWIDTH] IN BLOOD BY AUTOMATED COUNT: 15.5 % (ref 11.5–14.5)
FERRITIN SERPL-MCNC: 4163.1 NG/ML (ref 20–300)
GFR SERPLBLD CREATININE-BSD FMLA CKD-EPI: 54 ML/MIN/1.73/M2
GLUCOSE SERPL-MCNC: 108 MG/DL (ref 70–110)
HAPTOGLOB SERPL-MCNC: <10 MG/DL (ref 30–250)
HCT VFR BLD AUTO: 28.9 % (ref 40–54)
HGB BLD-MCNC: 9.5 GM/DL (ref 14–18)
INDIRECT COOMBS: NORMAL
LDH SERPL-CCNC: 308 U/L (ref 110–260)
LYMPHOCYTES NFR BLD MANUAL: 26 % (ref 18–48)
MAGNESIUM SERPL-MCNC: 1.6 MG/DL (ref 1.6–2.6)
MCH RBC QN AUTO: 39.3 PG (ref 27–31)
MCHC RBC AUTO-ENTMCNC: 32.9 G/DL (ref 32–36)
MCV RBC AUTO: 119 FL (ref 82–98)
MONOCYTES NFR BLD MANUAL: 26 % (ref 4–15)
NEUTROPHILS NFR BLD MANUAL: 48 % (ref 38–73)
NUCLEATED RBC (/100WBC) (OHS): 0 /100 WBC
PHOSPHATE SERPL-MCNC: 3.8 MG/DL (ref 2.7–4.5)
PLATELET # BLD AUTO: 30 K/UL (ref 150–450)
PLATELET BLD QL SMEAR: ABNORMAL
PMV BLD AUTO: 12.1 FL (ref 9.2–12.9)
POIKILOCYTOSIS BLD QL SMEAR: SLIGHT
POLYCHROMASIA BLD QL SMEAR: ABNORMAL
POTASSIUM SERPL-SCNC: 5 MMOL/L (ref 3.5–5.1)
PROT SERPL-MCNC: 5.7 GM/DL (ref 6–8.4)
RBC # BLD AUTO: 2.42 M/UL (ref 4.6–6.2)
RH BLD: NORMAL
SCHISTOCYTES BLD QL SMEAR: ABNORMAL
SODIUM SERPL-SCNC: 142 MMOL/L (ref 136–145)
SPECIMEN OUTDATE: NORMAL
URATE SERPL-MCNC: 4.7 MG/DL (ref 3.4–7)
VIT B12 SERPL-MCNC: 1081 PG/ML (ref 210–950)
WBC # BLD AUTO: 1.13 K/UL (ref 3.9–12.7)

## 2025-06-25 PROCEDURE — 83735 ASSAY OF MAGNESIUM: CPT

## 2025-06-25 PROCEDURE — 82607 VITAMIN B-12: CPT

## 2025-06-25 PROCEDURE — 82728 ASSAY OF FERRITIN: CPT

## 2025-06-25 PROCEDURE — 80053 COMPREHEN METABOLIC PANEL: CPT

## 2025-06-25 PROCEDURE — 86901 BLOOD TYPING SEROLOGIC RH(D): CPT | Performed by: INTERNAL MEDICINE

## 2025-06-25 PROCEDURE — 80197 ASSAY OF TACROLIMUS: CPT

## 2025-06-25 PROCEDURE — 87799 DETECT AGENT NOS DNA QUANT: CPT

## 2025-06-25 PROCEDURE — 83010 ASSAY OF HAPTOGLOBIN QUANT: CPT

## 2025-06-25 PROCEDURE — 36415 COLL VENOUS BLD VENIPUNCTURE: CPT

## 2025-06-25 PROCEDURE — 84550 ASSAY OF BLOOD/URIC ACID: CPT

## 2025-06-25 PROCEDURE — 84100 ASSAY OF PHOSPHORUS: CPT

## 2025-06-25 PROCEDURE — 85007 BL SMEAR W/DIFF WBC COUNT: CPT

## 2025-06-25 PROCEDURE — 83615 LACTATE (LD) (LDH) ENZYME: CPT

## 2025-06-26 ENCOUNTER — RESULTS FOLLOW-UP (OUTPATIENT)
Dept: HEMATOLOGY/ONCOLOGY | Facility: CLINIC | Age: 70
End: 2025-06-26

## 2025-06-26 LAB
CYTOMEGALOVIRUS DNA, QUAL (OHS): NOT DETECTED
EPSTEIN-BARR VIRUS DNA, QUAL (OHS): NORMAL
TACROLIMUS BLD-MCNC: 9.4 NG/ML (ref 5–15)

## 2025-06-26 NOTE — PROGRESS NOTES
BMT Pharmacist Immunosuppression Note:    Reviewed patient's tacrolimus level with Dr. Hickey and it is within goal range.      Current regimen: 0.5mg BID  Capsule size(s): 0.5mg    Plan: Continue current regimen. Instructed patient to drink lots of water to help with increase in Scr.         Lab Results   Component Value Date    TACROLIMUS 9.4 06/25/2025    TACROLIMUS 8.5 06/18/2025    TACROLIMUS 7.8 06/11/2025       Creatinine   Date Value Ref Range Status   06/25/2025 1.4 0.5 - 1.4 mg/dL Final   06/18/2025 1.2 0.5 - 1.4 mg/dL Final   06/11/2025 1.2 0.5 - 1.4 mg/dL Final     Total Bilirubin   Date Value Ref Range Status   03/17/2025 0.5 0.1 - 1.0 mg/dL Final     Comment:     For infants and newborns, interpretation of results should be based  on gestational age, weight and in agreement with clinical  observations.    Premature Infant recommended reference ranges:  Up to 24 hours.............<8.0 mg/dL  Up to 48 hours............<12.0 mg/dL  3-5 days..................<15.0 mg/dL  6-29 days.................<15.0 mg/dL     03/10/2025 0.4 0.1 - 1.0 mg/dL Final     Comment:     For infants and newborns, interpretation of results should be based  on gestational age, weight and in agreement with clinical  observations.    Premature Infant recommended reference ranges:  Up to 24 hours.............<8.0 mg/dL  Up to 48 hours............<12.0 mg/dL  3-5 days..................<15.0 mg/dL  6-29 days.................<15.0 mg/dL     03/03/2025 0.5 0.1 - 1.0 mg/dL Final     Comment:     For infants and newborns, interpretation of results should be based  on gestational age, weight and in agreement with clinical  observations.    Premature Infant recommended reference ranges:  Up to 24 hours.............<8.0 mg/dL  Up to 48 hours............<12.0 mg/dL  3-5 days..................<15.0 mg/dL  6-29 days.................<15.0 mg/dL       Bilirubin Total   Date Value Ref Range Status   06/25/2025 0.5 0.1 - 1.0 mg/dL Final      Comment:     For infants and newborns, interpretation of results should be based   on gestational age, weight and in agreement with clinical   observations.    Premature Infant recommended reference ranges:   0-24 hours:  <8.0 mg/dL   24-48 hours: <12.0 mg/dL   3-5 days:    <15.0 mg/dL   6-29 days:   <15.0 mg/dL   06/18/2025 0.6 0.1 - 1.0 mg/dL Final     Comment:     For infants and newborns, interpretation of results should be based   on gestational age, weight and in agreement with clinical   observations.    Premature Infant recommended reference ranges:   0-24 hours:  <8.0 mg/dL   24-48 hours: <12.0 mg/dL   3-5 days:    <15.0 mg/dL   6-29 days:   <15.0 mg/dL   06/11/2025 0.7 0.1 - 1.0 mg/dL Final     Comment:     For infants and newborns, interpretation of results should be based   on gestational age, weight and in agreement with clinical   observations.    Premature Infant recommended reference ranges:   0-24 hours:  <8.0 mg/dL   24-48 hours: <12.0 mg/dL   3-5 days:    <15.0 mg/dL   6-29 days:   <15.0 mg/dL     AST   Date Value Ref Range Status   06/25/2025 20 11 - 45 unit/L Final   06/18/2025 18 11 - 45 unit/L Final   06/11/2025 19 11 - 45 unit/L Final   03/17/2025 31 10 - 40 U/L Final   03/10/2025 23 10 - 40 U/L Final   03/03/2025 26 10 - 40 U/L Final     ALT   Date Value Ref Range Status   06/25/2025 19 10 - 44 unit/L Final   06/18/2025 23 10 - 44 unit/L Final   06/11/2025 26 10 - 44 unit/L Final   03/17/2025 35 10 - 44 U/L Final   03/10/2025 31 10 - 44 U/L Final   03/03/2025 36 10 - 44 U/L Final             Sanjuana Poe, Pharm.D., BCOP  Clinical Pharmacy Specialist, Bone Marrow Transplant/Cellular Therapy  [facility]  SpectraLink: [phone]

## 2025-06-29 ENCOUNTER — PATIENT MESSAGE (OUTPATIENT)
Dept: HEMATOLOGY/ONCOLOGY | Facility: CLINIC | Age: 70
End: 2025-06-29
Payer: MEDICARE

## 2025-06-29 DIAGNOSIS — D46.9 MYELODYSPLASTIC SYNDROME: ICD-10-CM

## 2025-06-29 DIAGNOSIS — G47.00 INSOMNIA, UNSPECIFIED TYPE: ICD-10-CM

## 2025-06-29 DIAGNOSIS — G25.81 RESTLESS LEG SYNDROME: ICD-10-CM

## 2025-06-30 RX ORDER — GABAPENTIN 300 MG/1
600 CAPSULE ORAL NIGHTLY PRN
Qty: 60 CAPSULE | Refills: 2 | Status: SHIPPED | OUTPATIENT
Start: 2025-06-30

## 2025-06-30 RX ORDER — TACROLIMUS 0.5 MG/1
CAPSULE ORAL
Qty: 90 CAPSULE | Refills: 3 | Status: SHIPPED | OUTPATIENT
Start: 2025-06-30

## 2025-07-02 ENCOUNTER — LAB VISIT (OUTPATIENT)
Dept: LAB | Facility: HOSPITAL | Age: 70
End: 2025-07-02
Attending: INTERNAL MEDICINE
Payer: MEDICARE

## 2025-07-02 ENCOUNTER — TELEPHONE (OUTPATIENT)
Dept: HEMATOLOGY/ONCOLOGY | Facility: CLINIC | Age: 70
End: 2025-07-02
Payer: MEDICARE

## 2025-07-02 DIAGNOSIS — Z76.82 STEM CELL TRANSPLANT CANDIDATE: ICD-10-CM

## 2025-07-02 DIAGNOSIS — D46.9 MYELODYSPLASTIC SYNDROME: ICD-10-CM

## 2025-07-02 LAB
ABSOLUTE NEUTROPHIL MANUAL (OHS): 0.7 K/UL
ALBUMIN SERPL BCP-MCNC: 3.3 G/DL (ref 3.5–5.2)
ALP SERPL-CCNC: 61 UNIT/L (ref 40–150)
ALT SERPL W/O P-5'-P-CCNC: 18 UNIT/L (ref 10–44)
ANION GAP (OHS): 3 MMOL/L (ref 8–16)
ANISOCYTOSIS BLD QL SMEAR: SLIGHT
AST SERPL-CCNC: 21 UNIT/L (ref 11–45)
BILIRUB SERPL-MCNC: 0.6 MG/DL (ref 0.1–1)
BUN SERPL-MCNC: 14 MG/DL (ref 8–23)
CALCIUM SERPL-MCNC: 8.8 MG/DL (ref 8.7–10.5)
CHLORIDE SERPL-SCNC: 107 MMOL/L (ref 95–110)
CO2 SERPL-SCNC: 30 MMOL/L (ref 23–29)
CREAT SERPL-MCNC: 1.6 MG/DL (ref 0.5–1.4)
ERYTHROCYTE [DISTWIDTH] IN BLOOD BY AUTOMATED COUNT: 14.7 % (ref 11.5–14.5)
GFR SERPLBLD CREATININE-BSD FMLA CKD-EPI: 46 ML/MIN/1.73/M2
GLUCOSE SERPL-MCNC: 119 MG/DL (ref 70–110)
HCT VFR BLD AUTO: 27.4 % (ref 40–54)
HGB BLD-MCNC: 9.2 GM/DL (ref 14–18)
HYPOCHROMIA BLD QL SMEAR: ABNORMAL
INDIRECT COOMBS: NORMAL
INR PPP: 1 (ref 0.8–1.2)
LDH SERPL-CCNC: 258 U/L (ref 110–260)
LYMPHOCYTES NFR BLD MANUAL: 32 % (ref 18–48)
MAGNESIUM SERPL-MCNC: 1.7 MG/DL (ref 1.6–2.6)
MCH RBC QN AUTO: 39.3 PG (ref 27–31)
MCHC RBC AUTO-ENTMCNC: 33.6 G/DL (ref 32–36)
MCV RBC AUTO: 117 FL (ref 82–98)
MONOCYTES NFR BLD MANUAL: 4 % (ref 4–15)
NEUTROPHILS NFR BLD MANUAL: 64 % (ref 38–73)
NUCLEATED RBC (/100WBC) (OHS): 0 /100 WBC
OVALOCYTES BLD QL SMEAR: ABNORMAL
PHOSPHATE SERPL-MCNC: 3.5 MG/DL (ref 2.7–4.5)
PLATELET # BLD AUTO: 32 K/UL (ref 150–450)
PLATELET BLD QL SMEAR: ABNORMAL
PMV BLD AUTO: 11.2 FL (ref 9.2–12.9)
POIKILOCYTOSIS BLD QL SMEAR: SLIGHT
POLYCHROMASIA BLD QL SMEAR: ABNORMAL
POTASSIUM SERPL-SCNC: 4.2 MMOL/L (ref 3.5–5.1)
PROT SERPL-MCNC: 5.6 GM/DL (ref 6–8.4)
PROTHROMBIN TIME: 11.9 SECONDS (ref 9–12.5)
RBC # BLD AUTO: 2.34 M/UL (ref 4.6–6.2)
RH BLD: NORMAL
SODIUM SERPL-SCNC: 140 MMOL/L (ref 136–145)
SPECIMEN OUTDATE: NORMAL
TACROLIMUS BLD-MCNC: 8.1 NG/ML (ref 5–15)
URATE SERPL-MCNC: 5.1 MG/DL (ref 3.4–7)
WBC # BLD AUTO: 1.17 K/UL (ref 3.9–12.7)

## 2025-07-02 PROCEDURE — 87799 DETECT AGENT NOS DNA QUANT: CPT

## 2025-07-02 PROCEDURE — 86900 BLOOD TYPING SEROLOGIC ABO: CPT | Performed by: INTERNAL MEDICINE

## 2025-07-02 PROCEDURE — 85025 COMPLETE CBC W/AUTO DIFF WBC: CPT

## 2025-07-02 PROCEDURE — 85610 PROTHROMBIN TIME: CPT

## 2025-07-02 PROCEDURE — 83615 LACTATE (LD) (LDH) ENZYME: CPT

## 2025-07-02 PROCEDURE — 36415 COLL VENOUS BLD VENIPUNCTURE: CPT

## 2025-07-02 PROCEDURE — 84550 ASSAY OF BLOOD/URIC ACID: CPT

## 2025-07-02 PROCEDURE — 84100 ASSAY OF PHOSPHORUS: CPT

## 2025-07-02 PROCEDURE — 80197 ASSAY OF TACROLIMUS: CPT

## 2025-07-02 PROCEDURE — 83735 ASSAY OF MAGNESIUM: CPT

## 2025-07-02 PROCEDURE — 82040 ASSAY OF SERUM ALBUMIN: CPT

## 2025-07-03 ENCOUNTER — INFUSION (OUTPATIENT)
Dept: INFUSION THERAPY | Facility: HOSPITAL | Age: 70
End: 2025-07-03
Attending: INTERNAL MEDICINE
Payer: MEDICARE

## 2025-07-03 ENCOUNTER — TELEPHONE (OUTPATIENT)
Dept: HEMATOLOGY/ONCOLOGY | Facility: CLINIC | Age: 70
End: 2025-07-03
Payer: MEDICARE

## 2025-07-03 ENCOUNTER — RESULTS FOLLOW-UP (OUTPATIENT)
Dept: HEMATOLOGY/ONCOLOGY | Facility: CLINIC | Age: 70
End: 2025-07-03
Payer: MEDICARE

## 2025-07-03 VITALS
OXYGEN SATURATION: 96 % | TEMPERATURE: 99 F | DIASTOLIC BLOOD PRESSURE: 71 MMHG | RESPIRATION RATE: 18 BRPM | HEART RATE: 98 BPM | SYSTOLIC BLOOD PRESSURE: 132 MMHG

## 2025-07-03 DIAGNOSIS — N17.9 AKI (ACUTE KIDNEY INJURY): Primary | ICD-10-CM

## 2025-07-03 DIAGNOSIS — D69.3 IDIOPATHIC THROMBOCYTOPENIC PURPURA (ITP): ICD-10-CM

## 2025-07-03 LAB
CYTOMEGALOVIRUS DNA, QUAL (OHS): NOT DETECTED
EPSTEIN-BARR VIRUS DNA, QUAL (OHS): NORMAL

## 2025-07-03 PROCEDURE — 25000003 PHARM REV CODE 250

## 2025-07-03 PROCEDURE — 96360 HYDRATION IV INFUSION INIT: CPT

## 2025-07-03 PROCEDURE — A4216 STERILE WATER/SALINE, 10 ML: HCPCS

## 2025-07-03 RX ORDER — SODIUM CHLORIDE 0.9 % (FLUSH) 0.9 %
10 SYRINGE (ML) INJECTION
Status: DISCONTINUED | OUTPATIENT
Start: 2025-07-03 | End: 2025-07-03 | Stop reason: HOSPADM

## 2025-07-03 RX ORDER — SODIUM CHLORIDE 0.9 % (FLUSH) 0.9 %
10 SYRINGE (ML) INJECTION
OUTPATIENT
Start: 2025-07-04

## 2025-07-03 RX ORDER — ZOLPIDEM TARTRATE 5 MG/1
5 TABLET ORAL NIGHTLY PRN
Qty: 30 TABLET | Refills: 0 | Status: SHIPPED | OUTPATIENT
Start: 2025-07-03

## 2025-07-03 RX ORDER — HEPARIN 100 UNIT/ML
500 SYRINGE INTRAVENOUS
OUTPATIENT
Start: 2025-07-04

## 2025-07-03 RX ORDER — HEPARIN 100 UNIT/ML
500 SYRINGE INTRAVENOUS
Status: DISCONTINUED | OUTPATIENT
Start: 2025-07-03 | End: 2025-07-03 | Stop reason: HOSPADM

## 2025-07-03 RX ADMIN — Medication 10 ML: at 12:07

## 2025-07-03 RX ADMIN — SODIUM CHLORIDE 1000 ML: 9 INJECTION, SOLUTION INTRAVENOUS at 11:07

## 2025-07-03 NOTE — NURSING
Pt received IV fluids. Pt tolerated it well. AVS given. Next appointment scheduled/ Pt will follow up with MD. Discharged with no acute distress.

## 2025-07-03 NOTE — PROGRESS NOTES
BMT Pharmacist Immunosuppression Note:    Reviewed patient's tacrolimus level with Dr. Hickey and it is within goal range. However, renal function continues to trend up. Looking into getting him fluids today.     Current regimen: 0.5mg BID  Capsule size(s): 0.5mg    Plan: Continue current regimen.        Lab Results   Component Value Date    TACROLIMUS 8.1 07/02/2025    TACROLIMUS 9.4 06/25/2025    TACROLIMUS 8.5 06/18/2025       Creatinine   Date Value Ref Range Status   07/02/2025 1.6 (H) 0.5 - 1.4 mg/dL Final   06/25/2025 1.4 0.5 - 1.4 mg/dL Final   06/18/2025 1.2 0.5 - 1.4 mg/dL Final     Total Bilirubin   Date Value Ref Range Status   03/17/2025 0.5 0.1 - 1.0 mg/dL Final     Comment:     For infants and newborns, interpretation of results should be based  on gestational age, weight and in agreement with clinical  observations.    Premature Infant recommended reference ranges:  Up to 24 hours.............<8.0 mg/dL  Up to 48 hours............<12.0 mg/dL  3-5 days..................<15.0 mg/dL  6-29 days.................<15.0 mg/dL     03/10/2025 0.4 0.1 - 1.0 mg/dL Final     Comment:     For infants and newborns, interpretation of results should be based  on gestational age, weight and in agreement with clinical  observations.    Premature Infant recommended reference ranges:  Up to 24 hours.............<8.0 mg/dL  Up to 48 hours............<12.0 mg/dL  3-5 days..................<15.0 mg/dL  6-29 days.................<15.0 mg/dL     03/03/2025 0.5 0.1 - 1.0 mg/dL Final     Comment:     For infants and newborns, interpretation of results should be based  on gestational age, weight and in agreement with clinical  observations.    Premature Infant recommended reference ranges:  Up to 24 hours.............<8.0 mg/dL  Up to 48 hours............<12.0 mg/dL  3-5 days..................<15.0 mg/dL  6-29 days.................<15.0 mg/dL       Bilirubin Total   Date Value Ref Range Status   07/02/2025 0.6 0.1 - 1.0 mg/dL  Final     Comment:     For infants and newborns, interpretation of results should be based   on gestational age, weight and in agreement with clinical   observations.    Premature Infant recommended reference ranges:   0-24 hours:  <8.0 mg/dL   24-48 hours: <12.0 mg/dL   3-5 days:    <15.0 mg/dL   6-29 days:   <15.0 mg/dL   06/25/2025 0.5 0.1 - 1.0 mg/dL Final     Comment:     For infants and newborns, interpretation of results should be based   on gestational age, weight and in agreement with clinical   observations.    Premature Infant recommended reference ranges:   0-24 hours:  <8.0 mg/dL   24-48 hours: <12.0 mg/dL   3-5 days:    <15.0 mg/dL   6-29 days:   <15.0 mg/dL   06/18/2025 0.6 0.1 - 1.0 mg/dL Final     Comment:     For infants and newborns, interpretation of results should be based   on gestational age, weight and in agreement with clinical   observations.    Premature Infant recommended reference ranges:   0-24 hours:  <8.0 mg/dL   24-48 hours: <12.0 mg/dL   3-5 days:    <15.0 mg/dL   6-29 days:   <15.0 mg/dL     AST   Date Value Ref Range Status   07/02/2025 21 11 - 45 unit/L Final   06/25/2025 20 11 - 45 unit/L Final   06/18/2025 18 11 - 45 unit/L Final   03/17/2025 31 10 - 40 U/L Final   03/10/2025 23 10 - 40 U/L Final   03/03/2025 26 10 - 40 U/L Final     ALT   Date Value Ref Range Status   07/02/2025 18 10 - 44 unit/L Final   06/25/2025 19 10 - 44 unit/L Final   06/18/2025 23 10 - 44 unit/L Final   03/17/2025 35 10 - 44 U/L Final   03/10/2025 31 10 - 44 U/L Final   03/03/2025 36 10 - 44 U/L Final             Sanjuana Poe, Pharm.D., BCOP  Clinical Pharmacy Specialist, Bone Marrow Transplant/Cellular Therapy  [facility]  SpectraLink: [phone]

## 2025-07-07 ENCOUNTER — PATIENT MESSAGE (OUTPATIENT)
Dept: HEMATOLOGY/ONCOLOGY | Facility: CLINIC | Age: 70
End: 2025-07-07
Payer: MEDICARE

## 2025-07-07 RX ORDER — ELTROMBOPAG 50 MG/1
100 TABLET, FILM COATED ORAL DAILY
Qty: 60 TABLET | Refills: 11 | Status: ON HOLD | OUTPATIENT
Start: 2025-07-07

## 2025-07-08 ENCOUNTER — TELEPHONE (OUTPATIENT)
Dept: HEMATOLOGY/ONCOLOGY | Facility: CLINIC | Age: 70
End: 2025-07-08
Payer: MEDICARE

## 2025-07-08 ENCOUNTER — LAB VISIT (OUTPATIENT)
Dept: LAB | Facility: HOSPITAL | Age: 70
End: 2025-07-08
Attending: INTERNAL MEDICINE
Payer: MEDICARE

## 2025-07-08 ENCOUNTER — CLINICAL SUPPORT (OUTPATIENT)
Dept: HEMATOLOGY/ONCOLOGY | Facility: CLINIC | Age: 70
End: 2025-07-08
Payer: MEDICARE

## 2025-07-08 VITALS
DIASTOLIC BLOOD PRESSURE: 55 MMHG | TEMPERATURE: 98 F | OXYGEN SATURATION: 95 % | WEIGHT: 149.81 LBS | HEIGHT: 68 IN | HEART RATE: 79 BPM | BODY MASS INDEX: 22.7 KG/M2 | SYSTOLIC BLOOD PRESSURE: 98 MMHG

## 2025-07-08 DIAGNOSIS — J06.9 UPPER RESPIRATORY TRACT INFECTION, UNSPECIFIED TYPE: Primary | ICD-10-CM

## 2025-07-08 DIAGNOSIS — Z76.82 STEM CELL TRANSPLANT CANDIDATE: ICD-10-CM

## 2025-07-08 DIAGNOSIS — D46.9 MYELODYSPLASTIC SYNDROME: ICD-10-CM

## 2025-07-08 LAB
ABSOLUTE NEUTROPHIL MANUAL (OHS): 0 K/UL
ALBUMIN SERPL BCP-MCNC: 3.1 G/DL (ref 3.5–5.2)
ALP SERPL-CCNC: 61 UNIT/L (ref 40–150)
ALT SERPL W/O P-5'-P-CCNC: 13 UNIT/L (ref 10–44)
ANION GAP (OHS): 7 MMOL/L (ref 8–16)
ANISOCYTOSIS BLD QL SMEAR: SLIGHT
AST SERPL-CCNC: 24 UNIT/L (ref 11–45)
B PERT DNA NPH QL NAA+PROBE: NOT DETECTED
BASOPHILS NFR BLD MANUAL: 2 %
BILIRUB SERPL-MCNC: 0.5 MG/DL (ref 0.1–1)
BUN SERPL-MCNC: 15 MG/DL (ref 8–23)
C PNEUM DNA LOWER RESP QL NAA+NON-PROBE: NOT DETECTED
CALCIUM SERPL-MCNC: 8.7 MG/DL (ref 8.7–10.5)
CHLORIDE SERPL-SCNC: 106 MMOL/L (ref 95–110)
CO2 SERPL-SCNC: 24 MMOL/L (ref 23–29)
CREAT SERPL-MCNC: 1.6 MG/DL (ref 0.5–1.4)
EOSINOPHIL NFR BLD MANUAL: 2 % (ref 0–8)
ERYTHROCYTE [DISTWIDTH] IN BLOOD BY AUTOMATED COUNT: 14 % (ref 11.5–14.5)
FLUAV RNA NPH QL NAA+NON-PROBE: NOT DETECTED
FLUBV RNA NPH QL NAA+NON-PROBE: NOT DETECTED
GFR SERPLBLD CREATININE-BSD FMLA CKD-EPI: 46 ML/MIN/1.73/M2
GLUCOSE SERPL-MCNC: 108 MG/DL (ref 70–110)
HADV DNA NPH QL NAA+NON-PROBE: NOT DETECTED
HCOV 229E RNA NPH QL NAA+NON-PROBE: NOT DETECTED
HCOV HKU1 RNA NPH QL NAA+NON-PROBE: NOT DETECTED
HCOV NL63 RNA NPH QL NAA+NON-PROBE: NOT DETECTED
HCOV OC43 RNA NPH QL NAA+NON-PROBE: NOT DETECTED
HCT VFR BLD AUTO: 23.3 % (ref 40–54)
HGB BLD-MCNC: 8.1 GM/DL (ref 14–18)
HMPV RNA LOWER RESP QL NAA+NON-PROBE: NOT DETECTED
HMPV RNA NPH QL NAA+NON-PROBE: NOT DETECTED
HPIV1 RNA NPH QL NAA+NON-PROBE: NOT DETECTED
HPIV2 RNA NPH QL NAA+NON-PROBE: NOT DETECTED
HPIV3 RNA NPH QL NAA+NON-PROBE: NOT DETECTED
HPIV4 RNA NPH QL NAA+NON-PROBE: NOT DETECTED
INDIRECT COOMBS: NORMAL
LYMPHOCYTES NFR BLD MANUAL: 24 % (ref 18–48)
MAGNESIUM SERPL-MCNC: 1.7 MG/DL (ref 1.6–2.6)
MCH RBC QN AUTO: 39.5 PG (ref 27–31)
MCHC RBC AUTO-ENTMCNC: 34.8 G/DL (ref 32–36)
MCV RBC AUTO: 114 FL (ref 82–98)
MONOCYTES NFR BLD MANUAL: 21 % (ref 4–15)
NEUTROPHILS NFR BLD MANUAL: 51 % (ref 38–73)
NUCLEATED RBC (/100WBC) (OHS): 0 /100 WBC
PHOSPHATE SERPL-MCNC: 3.3 MG/DL (ref 2.7–4.5)
PLATELET # BLD AUTO: 35 K/UL (ref 150–450)
PLATELET BLD QL SMEAR: ABNORMAL
PLATELET BLD QL SMEAR: ABNORMAL
PMV BLD AUTO: 11.2 FL (ref 9.2–12.9)
POLYCHROMASIA BLD QL SMEAR: ABNORMAL
POTASSIUM SERPL-SCNC: 3.7 MMOL/L (ref 3.5–5.1)
PROT SERPL-MCNC: 5.3 GM/DL (ref 6–8.4)
RBC # BLD AUTO: 2.05 M/UL (ref 4.6–6.2)
RH BLD: NORMAL
RSV RNA NPH QL NAA+NON-PROBE: NOT DETECTED
RSV RNA NPH QL NAA+NON-PROBE: NOT DETECTED
RV+EV RNA NPH QL NAA+NON-PROBE: DETECTED
SARS-COV-2 RNA RESP QL NAA+PROBE: NOT DETECTED
SODIUM SERPL-SCNC: 137 MMOL/L (ref 136–145)
SPECIMEN OUTDATE: NORMAL
SPECIMEN SOURCE: ABNORMAL
WBC # BLD AUTO: 1.36 K/UL (ref 3.9–12.7)

## 2025-07-08 PROCEDURE — 0202U NFCT DS 22 TRGT SARS-COV-2: CPT

## 2025-07-08 PROCEDURE — 36415 COLL VENOUS BLD VENIPUNCTURE: CPT

## 2025-07-08 PROCEDURE — 85025 COMPLETE CBC W/AUTO DIFF WBC: CPT

## 2025-07-08 PROCEDURE — 86901 BLOOD TYPING SEROLOGIC RH(D): CPT | Performed by: INTERNAL MEDICINE

## 2025-07-08 PROCEDURE — 84460 ALANINE AMINO (ALT) (SGPT): CPT

## 2025-07-08 PROCEDURE — 80197 ASSAY OF TACROLIMUS: CPT

## 2025-07-08 PROCEDURE — 87799 DETECT AGENT NOS DNA QUANT: CPT

## 2025-07-08 PROCEDURE — 83735 ASSAY OF MAGNESIUM: CPT

## 2025-07-08 PROCEDURE — 84100 ASSAY OF PHOSPHORUS: CPT

## 2025-07-09 ENCOUNTER — INFUSION (OUTPATIENT)
Dept: INFUSION THERAPY | Facility: HOSPITAL | Age: 70
End: 2025-07-09
Attending: INTERNAL MEDICINE
Payer: MEDICARE

## 2025-07-09 ENCOUNTER — HOSPITAL ENCOUNTER (EMERGENCY)
Facility: HOSPITAL | Age: 70
Discharge: HOME | End: 2025-07-09
Attending: STUDENT IN AN ORGANIZED HEALTH CARE EDUCATION/TRAINING PROGRAM
Payer: MEDICARE

## 2025-07-09 ENCOUNTER — OFFICE VISIT (OUTPATIENT)
Dept: HEMATOLOGY/ONCOLOGY | Facility: CLINIC | Age: 70
End: 2025-07-09
Payer: MEDICARE

## 2025-07-09 VITALS
RESPIRATION RATE: 18 BRPM | TEMPERATURE: 98 F | DIASTOLIC BLOOD PRESSURE: 63 MMHG | OXYGEN SATURATION: 96 % | SYSTOLIC BLOOD PRESSURE: 102 MMHG | HEART RATE: 81 BPM

## 2025-07-09 VITALS
HEIGHT: 68 IN | DIASTOLIC BLOOD PRESSURE: 73 MMHG | SYSTOLIC BLOOD PRESSURE: 156 MMHG | BODY MASS INDEX: 22.73 KG/M2 | OXYGEN SATURATION: 98 % | RESPIRATION RATE: 19 BRPM | HEART RATE: 79 BPM | TEMPERATURE: 98 F | WEIGHT: 150 LBS

## 2025-07-09 VITALS
SYSTOLIC BLOOD PRESSURE: 101 MMHG | RESPIRATION RATE: 16 BRPM | WEIGHT: 149.25 LBS | TEMPERATURE: 98 F | DIASTOLIC BLOOD PRESSURE: 58 MMHG | HEIGHT: 68 IN | HEART RATE: 80 BPM | BODY MASS INDEX: 22.62 KG/M2 | OXYGEN SATURATION: 95 %

## 2025-07-09 DIAGNOSIS — R55 SYNCOPE, UNSPECIFIED SYNCOPE TYPE: ICD-10-CM

## 2025-07-09 DIAGNOSIS — N17.9 AKI (ACUTE KIDNEY INJURY): Primary | ICD-10-CM

## 2025-07-09 DIAGNOSIS — Z94.81 S/P ALLOGENEIC BONE MARROW TRANSPLANT: Primary | ICD-10-CM

## 2025-07-09 DIAGNOSIS — B34.8 RHINOVIRUS: ICD-10-CM

## 2025-07-09 DIAGNOSIS — D61.818 PANCYTOPENIA: Primary | ICD-10-CM

## 2025-07-09 DIAGNOSIS — M47.9 SPONDYLOSIS: ICD-10-CM

## 2025-07-09 DIAGNOSIS — G25.81 RESTLESS LEG SYNDROME: ICD-10-CM

## 2025-07-09 DIAGNOSIS — R55 SYNCOPE: ICD-10-CM

## 2025-07-09 DIAGNOSIS — B34.1 ENTEROVIRUS INFECTION: ICD-10-CM

## 2025-07-09 DIAGNOSIS — N40.0 BENIGN PROSTATIC HYPERPLASIA, UNSPECIFIED WHETHER LOWER URINARY TRACT SYMPTOMS PRESENT: ICD-10-CM

## 2025-07-09 DIAGNOSIS — D84.81 IMMUNODEFICIENCY DUE TO CONDITIONS CLASSIFIED ELSEWHERE: ICD-10-CM

## 2025-07-09 DIAGNOSIS — F41.9 ANXIETY: ICD-10-CM

## 2025-07-09 DIAGNOSIS — G47.00 INSOMNIA, UNSPECIFIED TYPE: ICD-10-CM

## 2025-07-09 DIAGNOSIS — D46.9 MYELODYSPLASTIC SYNDROME: ICD-10-CM

## 2025-07-09 DIAGNOSIS — D61.818 PANCYTOPENIA: ICD-10-CM

## 2025-07-09 LAB
ABSOLUTE NEUTROPHIL MANUAL (OHS): 0.9 K/UL
ALBUMIN SERPL BCP-MCNC: 3 G/DL (ref 3.5–5.2)
ALP SERPL-CCNC: 60 UNIT/L (ref 40–150)
ALT SERPL W/O P-5'-P-CCNC: 15 UNIT/L (ref 10–44)
ANION GAP (OHS): 6 MMOL/L (ref 8–16)
ANISOCYTOSIS BLD QL SMEAR: SLIGHT
AST SERPL-CCNC: 29 UNIT/L (ref 11–45)
BILIRUB SERPL-MCNC: 0.6 MG/DL (ref 0.1–1)
BUN SERPL-MCNC: 15 MG/DL (ref 8–23)
CALCIUM SERPL-MCNC: 8.1 MG/DL (ref 8.7–10.5)
CHLORIDE SERPL-SCNC: 106 MMOL/L (ref 95–110)
CO2 SERPL-SCNC: 24 MMOL/L (ref 23–29)
CREAT SERPL-MCNC: 1.7 MG/DL (ref 0.5–1.4)
CYTOMEGALOVIRUS DNA, QUAL (OHS): NOT DETECTED
EPSTEIN-BARR VIRUS DNA, QUAL (OHS): NORMAL
ERYTHROCYTE [DISTWIDTH] IN BLOOD BY AUTOMATED COUNT: 14.2 % (ref 11.5–14.5)
GFR SERPLBLD CREATININE-BSD FMLA CKD-EPI: 43 ML/MIN/1.73/M2
GLUCOSE SERPL-MCNC: 99 MG/DL (ref 70–110)
HCT VFR BLD AUTO: 20.7 % (ref 40–54)
HCV AB SERPL QL IA: NORMAL
HGB BLD-MCNC: 7.4 GM/DL (ref 14–18)
HIV 1+2 AB+HIV1 P24 AG SERPL QL IA: NORMAL
LYMPHOCYTES NFR BLD MANUAL: 22 % (ref 18–48)
MCH RBC QN AUTO: 39.2 PG (ref 27–31)
MCHC RBC AUTO-ENTMCNC: 35.7 G/DL (ref 32–36)
MCV RBC AUTO: 110 FL (ref 82–98)
MONOCYTES NFR BLD MANUAL: 22 % (ref 4–15)
NEUTROPHILS NFR BLD MANUAL: 56 % (ref 38–73)
NUCLEATED RBC (/100WBC) (OHS): 1 /100 WBC
OHS QRS DURATION: 72 MS
OHS QTC CALCULATION: 444 MS
PLATELET # BLD AUTO: 35 K/UL (ref 150–450)
PLATELET BLD QL SMEAR: ABNORMAL
PMV BLD AUTO: ABNORMAL FL
POIKILOCYTOSIS BLD QL SMEAR: SLIGHT
POTASSIUM SERPL-SCNC: 4 MMOL/L (ref 3.5–5.1)
PROT SERPL-MCNC: 4.7 GM/DL (ref 6–8.4)
RBC # BLD AUTO: 1.89 M/UL (ref 4.6–6.2)
SCHISTOCYTES BLD QL SMEAR: ABNORMAL
SODIUM SERPL-SCNC: 136 MMOL/L (ref 136–145)
TACROLIMUS BLD-MCNC: 11.8 NG/ML (ref 5–15)
TROPONIN I SERPL HS-MCNC: 4 NG/L
WBC # BLD AUTO: 1.61 K/UL (ref 3.9–12.7)

## 2025-07-09 PROCEDURE — 25000003 PHARM REV CODE 250

## 2025-07-09 PROCEDURE — 63600175 PHARM REV CODE 636 W HCPCS: Performed by: STUDENT IN AN ORGANIZED HEALTH CARE EDUCATION/TRAINING PROGRAM

## 2025-07-09 PROCEDURE — 84075 ASSAY ALKALINE PHOSPHATASE: CPT | Performed by: STUDENT IN AN ORGANIZED HEALTH CARE EDUCATION/TRAINING PROGRAM

## 2025-07-09 PROCEDURE — 96360 HYDRATION IV INFUSION INIT: CPT

## 2025-07-09 PROCEDURE — 93005 ELECTROCARDIOGRAM TRACING: CPT

## 2025-07-09 PROCEDURE — 87389 HIV-1 AG W/HIV-1&-2 AB AG IA: CPT | Performed by: PHYSICIAN ASSISTANT

## 2025-07-09 PROCEDURE — 85027 COMPLETE CBC AUTOMATED: CPT | Performed by: STUDENT IN AN ORGANIZED HEALTH CARE EDUCATION/TRAINING PROGRAM

## 2025-07-09 PROCEDURE — 85060 BLOOD SMEAR INTERPRETATION: CPT | Mod: ,,, | Performed by: PATHOLOGY

## 2025-07-09 PROCEDURE — 93010 ELECTROCARDIOGRAM REPORT: CPT | Mod: ,,, | Performed by: INTERNAL MEDICINE

## 2025-07-09 PROCEDURE — 99999 PR PBB SHADOW E&M-EST. PATIENT-LVL IV: CPT | Mod: PBBFAC,,,

## 2025-07-09 PROCEDURE — A4216 STERILE WATER/SALINE, 10 ML: HCPCS

## 2025-07-09 PROCEDURE — 84484 ASSAY OF TROPONIN QUANT: CPT

## 2025-07-09 PROCEDURE — 99285 EMERGENCY DEPT VISIT HI MDM: CPT | Mod: 25

## 2025-07-09 PROCEDURE — 86803 HEPATITIS C AB TEST: CPT | Performed by: PHYSICIAN ASSISTANT

## 2025-07-09 RX ORDER — LIDOCAINE 50 MG/G
1 PATCH TOPICAL
Status: DISCONTINUED | OUTPATIENT
Start: 2025-07-09 | End: 2025-07-09 | Stop reason: HOSPADM

## 2025-07-09 RX ORDER — SODIUM CHLORIDE 0.9 % (FLUSH) 0.9 %
10 SYRINGE (ML) INJECTION
Status: DISCONTINUED | OUTPATIENT
Start: 2025-07-09 | End: 2025-07-09 | Stop reason: HOSPADM

## 2025-07-09 RX ORDER — GUAIFENESIN 600 MG/1
600 TABLET, EXTENDED RELEASE ORAL 2 TIMES DAILY
Qty: 60 TABLET | Refills: 2 | Status: ON HOLD | OUTPATIENT
Start: 2025-07-09 | End: 2026-07-09

## 2025-07-09 RX ADMIN — SODIUM CHLORIDE, POTASSIUM CHLORIDE, SODIUM LACTATE AND CALCIUM CHLORIDE 500 ML: 600; 310; 30; 20 INJECTION, SOLUTION INTRAVENOUS at 05:07

## 2025-07-09 RX ADMIN — Medication 10 ML: at 11:07

## 2025-07-09 RX ADMIN — LIDOCAINE 1 PATCH: 50 PATCH CUTANEOUS at 03:07

## 2025-07-09 RX ADMIN — SODIUM CHLORIDE 1000 ML: 9 INJECTION, SOLUTION INTRAVENOUS at 10:07

## 2025-07-09 NOTE — ED NOTES
Patient comes into the emergency department by POV with complaints of fall. Patient states that he fell yesterday, LOC noted, pt reports dizziness/weakness since BMT in September. Pt states he got dizzy and weak, fell and hit left shoulder/elbow. Pt denies current pain, HA, vision changes, numbness/tingling, SOB, CP, N/V.

## 2025-07-09 NOTE — FIRST PROVIDER EVALUATION
Medical screening examination initiated.  I have conducted a focused provider triage encounter, findings are as follows:    Brief history of present illness:  sent from onc for ct scan of head after a fall yesterday    There were no vitals filed for this visit.    Pertinent physical exam:  NAD, chronically ill    Brief workup plan:  CT    Preliminary workup initiated; this workup will be continued and followed by the physician or advanced practice provider that is assigned to the patient when roomed.

## 2025-07-09 NOTE — PROGRESS NOTES
"Section of Hematology and Stem Cell Transplantation    Post-Transplantation Follow Up Visit     Notes:      07/09/2025    Transplant History:   Primary oncologist: Paco Hickey MD  Primary oncologic diagnosis: MDS  Transplant date: 9/19/2024  Donor: haploidentical  Blood Type (Patient): B +  Blood Type (Donor): A +  CMV (Patient): Positive  CMV (Donor): Positive  Graft source: Bone marrow  CD34+ cell dose: 3.55x10^6  Conditioning Regimen: Fludarabine plus melphalan 100 mg/m2 + 2Gy TBI  GVHD prophylaxis: Post-transplant cyclophosphamide, Tacrolimus, MMF  Immediate post-transplant complications: Patient experienced expected GI toxicities with C diff negative diarrhea and nausea, neutropenic fever with negative infectious work up, expected cytopenias requiring transfusions, electrolyte abnormalities requiring replacement, volume overload requiring diuresis, intermittent SOB from pulmonary edema requiring 02, and a hemorrhoid flare up. Diarrhea and SOB improved towards the end of the hospital stay.     History of Present Ilness:   Guillaume Salinas (Guillaume) is a pleasant 69 y.o.male with a past medical history of MDS who is status post haploidentical stem cell transplantation conditioned with FluMel 100 + PTCy+ 2Gy TBI who is currently day +293  who presents for post-transplant follow up.    He has not been doing great the past three weeks. He finished his steroids and two days later starting have dizzy spells, pressure issues, fatigue, productive cough, congestion, one episode of vomiting, and diarrhea. Yesterday we swabbed him in clinic and he tested positive for rhinovirus/enterovirus. The diarrhea has not been every day and he started taking imodium when it does occur. His wife reports yesterday that he fell while in the bathroom into his bathtub. Patient states he did not his head but did "black out for a few seconds". He has some deconditioning but overall stable. Denies diarrhea and rash.      PAST " MEDICAL HISTORY/:   Past Medical History:   Diagnosis Date    Anticoagulant long-term use     Coronary artery disease     Hypertension     Myelodysplastic syndrome     Peripheral vascular disease, unspecified        PAST SURGICAL HISTORY:   Past Surgical History:   Procedure Laterality Date    BONE MARROW BIOPSY Left 2023    Procedure: Biopsy-bone marrow;  Surgeon: Harry Diamond MD;  Location: [facility] OR;  Service: Oncology;  Laterality: Left;    COLONOSCOPY N/A 2022    Procedure: COLONOSCOPY Golytely Vaccinated will bring cards;  Surgeon: Dereje Simon MD;  Location: [facility];  Service: Endoscopy;  Laterality: N/A;  Do not cancel this order    ESOPHAGOGASTRODUODENOSCOPY N/A 2025    Procedure: EGD (ESOPHAGOGASTRODUODENOSCOPY);  Surgeon: Dudley Toth MD;  Location: [facility];  Service: Endoscopy;  Laterality: N/A;  ref by Gilma Ugalde, PA-C,portal-ae    INSERTION OF LEMONS CATHETER Right 2024    Procedure: INSERTION, CATHETER, CENTRAL VENOUS, LEMONS TRIPLE LUMEN;  Surgeon: Kg Patten MD;  Location: [address];  Service: General;  Laterality: Right;     PAST SOCIAL HISTORY:  Social History     Socioeconomic History    Marital status:    Tobacco Use    Smoking status: Former     Current packs/day: 0.00     Average packs/day: 0.3 packs/day for 50.0 years (12.5 ttl pk-yrs)     Types: Cigarettes     Start date: 3/1/1973     Quit date: 3/1/2023     Years since quittin.3     Passive exposure: Past    Smokeless tobacco: Never   Substance and Sexual Activity    Alcohol use: Not Currently    Drug use: Never    Sexual activity: Not Currently     Partners: Female     Social Drivers of Health     Financial Resource Strain: Patient Declined (2024)    Overall Financial Resource Strain (CARDIA)     Difficulty of Paying Living Expenses: Patient declined   Food Insecurity: Patient Declined (2024)    Hunger Vital Sign     Worried About Running Out of  Food in the Last Year: Patient declined     Ran Out of Food in the Last Year: Patient declined   Transportation Needs: Patient Declined (12/28/2024)    TRANSPORTATION NEEDS     Transportation : Patient declined   Physical Activity: Inactive (1/10/2025)    Exercise Vital Sign     Days of Exercise per Week: 0 days     Minutes of Exercise per Session: 10 min   Stress: Patient Declined (12/28/2024)    [employer] - Occupational Stress Questionnaire     Feeling of Stress : Patient declined   Recent Concern: Stress - Stress Concern Present (9/30/2024)    [employer] - Occupational Stress Questionnaire     Feeling of Stress : To some extent   Housing Stability: Patient Declined (12/28/2024)    Housing Stability Vital Sign     Unable to Pay for Housing in the Last Year: Patient declined     Homeless in the Last Year: Patient declined       FAMILY HISTORY:  Cancer-related family history includes Cancer in his brother and father.    CURRENT MEDICATIONS:   Medication List with Changes/Refills   Current Medications    ACYCLOVIR (ZOVIRAX) 800 MG TAB    Take 1 tablet (800 mg total) by mouth 2 (two) times daily.    ATOVAQUONE (MEPRON) 750 MG/5 ML SUSP ORAL LIQUID    Take 10 mLs (1,500 mg total) by mouth once daily.    BUDESONIDE (ENTOCORT EC) 3 MG CAPSULE    Take 1 capsule (3 mg total) by mouth 3 (three) times daily.    CARVEDILOL (COREG) 3.125 MG TABLET    Take 1 tablet (3.125 mg total) by mouth 2 (two) times daily.    CYANOCOBALAMIN 1,000 MCG/ML INJECTION    Inject 1 mL (1,000 mcg total) into the muscle once a week.    ELTROMBOPAG OLAMINE (PROMACTA) 50 MG TAB    Take 2 tablets (100 mg total) by mouth once daily.    FOLIC ACID (FOLVITE) 1 MG TABLET    Take 1 tablet (1 mg total) by mouth once daily.    GABAPENTIN (NEURONTIN) 300 MG CAPSULE    Take 2 capsules (600 mg total) by mouth nightly as needed (Restless leg).    HYDROCORTISONE 1 % CREAM    Apply to affected area 2  times daily    LETERMOVIR (PREVYMIS) 480 MG TAB    Take 1 tablet (480 mg total) by mouth Daily.    LEVOFLOXACIN (LEVAQUIN) 500 MG TABLET    Take 1 tablet (500 mg total) by mouth once daily.    LOPERAMIDE (IMODIUM A-D) 2 MG TAB    Take 2 mg by mouth 4 (four) times daily as needed.    LORAZEPAM (ATIVAN) 1 MG TABLET    Take 1 tablet (1 mg total) by mouth once. for 1 dose    MAGNESIUM OXIDE (MAG-OX) 400 MG (241.3 MG MAGNESIUM) TABLET    Take 2 tablets (800 mg total) by mouth 2 (two) times daily.    ONDANSETRON (ZOFRAN-ODT) 8 MG TBDL    Take 1 tablet (8 mg total) by mouth every 8 (eight) hours as needed (nausea).    PANTOPRAZOLE (PROTONIX) 40 MG TABLET    Take 1 tablet (40 mg total) by mouth once daily.    POSACONAZOLE (NOXAFIL) 100 MG TBEC TABLET    Take 3 tablets (300 mg total) by mouth once daily.    ROPINIROLE (REQUIP) 0.5 MG TABLET    Take 1 tablet (0.5 mg total) by mouth every evening.    TACROLIMUS (PROGRAF) 0.5 MG CAP    Take 1 capsule (0.5 mg total) by mouth every morning AND 1 capsule (0.5 mg total) every evening.    TAMSULOSIN (FLOMAX) 0.4 MG CAP    Take 1 capsule (0.4 mg total) by mouth once daily.    UNABLE TO FIND    Take by mouth once daily. medication name: copper    ZOLPIDEM (AMBIEN) 5 MG TAB    Take 1 tablet (5 mg total) by mouth nightly as needed (insomnia).       ALLERGIES:   Review of patient's allergies indicates:  No Known Allergies    GVHD Review of Systems:     Pertinent positives and negatives included in the HPI. Otherwise a 14 point review of systems is negative. GVHD review of systems recorded in BMT flowsheet.     Physical Exam:     Vitals:    07/09/25 1315   Resp: 16     General: Appears well, NAD.   HEENT: MMM, no OP lesions  Pulmonary: CTAB, no increased work of breathing, no W/R/C  Cardiovascular: S1S2 normal, RRR, no M/R/G  Abdominal: Soft, NT, ND, BS+, no HSM  Extremities: No C/C/E  Neurological: AAOx4, grossly normal, no focal deficits  Dermatologic: No rashes or lesions     ECOG  Performance Status: (foot note - ECOG PS provided by [employer]cology [employer]) 1 - Symptomatic but completely ambulatory    Karnofsky Performance Score:  80%- Normal Activity with Effort: Some Symptoms of Disease    Labs:   Lab Results   Component Value Date    WBC 1.36 (LL) 07/08/2025    HGB 8.1 (L) 07/08/2025    HCT 23.3 (L) 07/08/2025     (H) 07/08/2025    PLT 35 (LL) 07/08/2025        CMP  Sodium   Date Value Ref Range Status   07/08/2025 137 136 - 145 mmol/L Final   03/17/2025 140 136 - 145 mmol/L Final     Potassium   Date Value Ref Range Status   07/08/2025 3.7 3.5 - 5.1 mmol/L Final   03/17/2025 4.9 3.5 - 5.1 mmol/L Final     Chloride   Date Value Ref Range Status   07/08/2025 106 95 - 110 mmol/L Final   03/17/2025 105 95 - 110 mmol/L Final     CO2   Date Value Ref Range Status   07/08/2025 24 23 - 29 mmol/L Final   03/17/2025 26 23 - 29 mmol/L Final     Glucose   Date Value Ref Range Status   07/08/2025 108 70 - 110 mg/dL Final   03/17/2025 109 70 - 110 mg/dL Final     BUN   Date Value Ref Range Status   07/08/2025 15 8 - 23 mg/dL Final     Creatinine   Date Value Ref Range Status   07/08/2025 1.6 (H) 0.5 - 1.4 mg/dL Final     Calcium   Date Value Ref Range Status   07/08/2025 8.7 8.7 - 10.5 mg/dL Final   03/17/2025 9.3 8.7 - 10.5 mg/dL Final     Protein Total   Date Value Ref Range Status   07/08/2025 5.3 (L) 6.0 - 8.4 gm/dL Final     Total Protein   Date Value Ref Range Status   03/17/2025 6.1 6.0 - 8.4 g/dL Final     Albumin   Date Value Ref Range Status   07/08/2025 3.1 (L) 3.5 - 5.2 g/dL Final   03/17/2025 3.5 3.5 - 5.2 g/dL Final     Total Bilirubin   Date Value Ref Range Status   03/17/2025 0.5 0.1 - 1.0 mg/dL Final     Comment:     For infants and newborns, interpretation of results should be based  on gestational age, weight and in agreement with clinical  observations.    Premature Infant recommended reference ranges:  Up to 24 hours.............<8.0 mg/dL  Up to 48  hours............<12.0 mg/dL  3-5 days..................<15.0 mg/dL  6-29 days.................<15.0 mg/dL       Bilirubin Total   Date Value Ref Range Status   07/08/2025 0.5 0.1 - 1.0 mg/dL Final     Comment:     For infants and newborns, interpretation of results should be based   on gestational age, weight and in agreement with clinical   observations.    Premature Infant recommended reference ranges:   0-24 hours:  <8.0 mg/dL   24-48 hours: <12.0 mg/dL   3-5 days:    <15.0 mg/dL   6-29 days:   <15.0 mg/dL     Alkaline Phosphatase   Date Value Ref Range Status   03/17/2025 66 40 - 150 U/L Final     ALP   Date Value Ref Range Status   07/08/2025 61 40 - 150 unit/L Final     AST   Date Value Ref Range Status   07/08/2025 24 11 - 45 unit/L Final   03/17/2025 31 10 - 40 U/L Final     ALT   Date Value Ref Range Status   07/08/2025 13 10 - 44 unit/L Final   03/17/2025 35 10 - 44 U/L Final     Anion Gap   Date Value Ref Range Status   07/08/2025 7 (L) 8 - 16 mmol/L Final     eGFR   Date Value Ref Range Status   07/08/2025 46 (L) >60 mL/min/1.73/m2 Final     Comment:     Estimated GFR calculated using the CKD-EPI creatinine (2021) equation.   03/17/2025 >60 >60 mL/min/1.73 m^2 Final          Imaging:   Hospital imaging reviewed.    Pathology:  Prior pathology reviewed.     Acute GVHD Scoring:  GVHD Acute Assessment- Charted               Assessment and Plan:   Guillaume Salinas (Guillaume) is a pleasant 69 y.o.male with a past medical history of MDS s/p haplo SCT who presents for post-transplant follow up.    MDS: Status post treatment with azacitidine 75 mg/m2 daily x7 days plus venetoclax 100mg (voriconazole) daily x14 of 28 days.Venetoclax decreased to 7 days with cycle 3 until completion of 11 cycles. No plans for maintenance at this time.     Status post allogeneic stem cell transplantation: As noted above, status post haploidentical stem cell transplantation conditioned with FluMel 100 + PTCy+ 2Gy TBI .  Currently Day+ 293. Engrafted on 10/09/24 day +20.  Day 30 bone marrow (11/5/2024) showing mildly hypercellular marrow with no increased blast (0.3%) and no evidence of myeloid neoplasm. Pending chimerisms, NGS, and CG  Day 100 bone marrow biopsy on 12/31/24 showed 30-40% cellularity, erythroid hyperplasia, dyserythropoiesis, decreased megakaryocytes with atypical morphology. No hemophagocytosis mentioned. NGS, FISH, and CG normal. 100% chimerisms.   Bone marrow biopsy (6/9/25) revealed mildly hypercellular marrow with dyserythropoiesis. CG normal. NGS negative. Chimerisms 100% CD33/CD3. He remains in remission.     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 On 4/23/25 he developed persistent nausea and appetite suppression; Budesonide 3mg TID started again. 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 Prednisone 0.5 mg/kg started on 5/21/25. His symptoms resolved with oral steroids. Steroid taper below.   A. Current tacro dose: 0.5 mg BID  B. Last tacro level: 11.8  C. Adjustments: 0.5 mg daily.       Immunosuppression: Prevention with posaconazole, acyclovir. CMV prophylaxis with letermovir. PJP prophyalxis atovaquone. Continue weekly monitoring of CMV and EBV.  Last CMV: Not-detected  last EBV: negative  Active infections: N/A    Pancytopenia: He has had poor graft function since transplant likely due to multiple issues. He was both folate, copper, and B12 deficient, and he is currently taking supplementation. There was concern for possible HLH. Bactrim was changed to atovaquone. He started Promacta in 5/2025, and his dose was recently increased to 100mg daily. At some point in the future, he may need a CD34 selected boost. Will recheck levels next labs. If deficiencies have normalized and aGVHD symptoms remain absent, will consider boost at that point.   Folic Acid deficiency: He remains on folic acid  supplementation. His folate levels are now normal. At next visit, ok to stop folic acid supplementation.   Copper deficiency: He remains on copper gluconate 2mg daily. At next visit, ok to stop copper as the level normalized.   B12 deficiency: Continue B12 monthly. 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 EBV remains negative.     Hypomagnesemia: Continue MagOx to 800mg twice daily.      Anxiety, Restless Leg Syndrome: Noted since discharge by family. He started ropinirole 0.5mg and has experienced significant improvement. Continue Ropinirole    Insomnia: Patient now sleeping well and only waking up to urinate at night. Continue Ambien and Ropinirole for RLS.      Blood pressure abnormalities: For awhile patient had been holding off of his carvedilol due to hypotensions and dizziness. His pressures at home have been averaging 170s/90s so he started the carvedilol 6.25mg BID again. He began to complain of orthostatic hypotension symptoms when he takes the medication and BP in clinic is 105/55. His carvedilol was lowered to 3.125 daily. Today his blood pressure is normotensive. For now continue carvedilol 3.125mg BID and will monitor and make adjustments as needed.    Benign prostate hyperplasia: He saw urology 5/5/25 and was started on flomax for BPH.    12. Enterovirus and rhinovirus: Patient has respiratory infection swab yesterday in clinic and tested positive for rhinovirus/enterovirus. He has been experiencing productive cough, congestion, lack of appetite, fatigue, occasional diarrhea and nausea (not every day so not likely GVHD).  Guafenesin 600mg BID sent in. Advised patient to continue to cough the mucus up. He is aware to let us know if he becomes short of breath or is not getting better. He knows to go to the ER if a fever presents.     13. Syncope: Patient's wife reports he fell yesterday  int he bathroom into his bathtub. He reports hitting his elbow and not his head, but when asked about loss of consciousness he reports he did black out. His fall was unwitnessed and platelet count is 35. Patient sent to the ED to rule out head trauma/bleed.      Orders Placed:               Follow Up:       BMT Chart Routing      Follow up with physician    Follow up with [facility] 2 weeks. Gilma   Provider visit type    Infusion scheduling note    Injection scheduling note    Labs CBC, CMP, magnesium, phosphorus, type and screen, CMV, EBV and tacrolimus level   Scheduling:  Preferred lab:  Lab interval: once a week  [place]   Imaging    Pharmacy appointment    Other referrals                     A total of 40 minutes was spent in pre-visit chart review, personal interpretation of labs and imaging, and medication review. Total visit time 35 minutes, >50 % counseling.     Visit today included increased complexity associated with the longitudinal care of the patient due to the serious and/or complex managed problem(s)  MDS s/p Allo SCT    Gilma Ugalde PA-C  Malignant Hematology, Stem Cell Transplant, and Cellular Therapy  [facility]  [facility]

## 2025-07-09 NOTE — ED PROVIDER NOTES
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     Patient is a 69-year-old male with a past medical history of hypertension, CAD a myelodysplastic syndrome now s/p haploidentical stem cell transplantation who presents per the request of his oncology doctor secondary to a syncopal episode that occurred yesterday.  Patient states he was standing/urinating when he suddenly lost consciousness and fell down/hit his left side/the left elbow.  He does not believe that he hit his head.  He does endorse having some neck discomfort since the fall however.  Denies any fevers, chills, cough, nausea, vomiting, diarrhea or any change with bowel movements or urination.  Of note, patient's daughter states that he has a had some increasing fatigue/weakness over the course of the past few weeks.    Review of patient's allergies indicates:  No Known Allergies  Past Medical History:   Diagnosis Date    Anticoagulant long-term use     Coronary artery disease     Hypertension     Myelodysplastic syndrome     Peripheral vascular disease, unspecified      Past Surgical History:   Procedure Laterality Date    BONE MARROW BIOPSY Left 4/26/2023    Procedure: Biopsy-bone marrow;  Surgeon: Harry Diamond MD;  Location: [facility] OR;  Service: Oncology;  Laterality: Left;    COLONOSCOPY N/A 01/05/2022    Procedure: COLONOSCOPY Golytely Vaccinated will bring cards;  Surgeon: Dereje Simon MD;  Location: [facility];  Service: Endoscopy;  Laterality: N/A;  Do not cancel this order    ESOPHAGOGASTRODUODENOSCOPY N/A 5/8/2025    Procedure: EGD (ESOPHAGOGASTRODUODENOSCOPY);  Surgeon: Dudley Toth MD;  Location: [facility] ENDO;  Service: Endoscopy;  Laterality: N/A;  ref by Gilma Ugalde, SARA,portal-ae    INSERTION  OF LEMONS CATHETER Right 9/13/2024    Procedure: INSERTION, CATHETER, CENTRAL VENOUS, LEMONS TRIPLE LUMEN;  Surgeon: Kg Patten MD;  Location: [facility] OR [address] [place] [address];  Service: General;  Laterality: Right;     Family History   Problem Relation Name Age of Onset    Hypertension Mother      Cancer Father      Cancer Brother 9     No Known Problems Daughter      No Known Problems Daughter      No Known Problems Son       Social History[1]      Physical Exam     Initial Vitals [07/09/25 1410]   BP Pulse Resp Temp SpO2   118/70 79 16 98.4 °F (36.9 °C) 97 %      MAP       --         Physical Exam    Nursing note and vitals reviewed.  Constitutional: He appears well-developed. No distress.   Comfortably sitting in the bed, not in any acute distress.   HENT:   Head: Normocephalic and atraumatic. Mouth/Throat: Mucous membranes are normal.   Oropharynx noted to be mildly dry   Eyes: EOM are normal. Pupils are equal, round, and reactive to light.   Neck:   Normal range of motion.  Cardiovascular:  Normal rate, regular rhythm, normal heart sounds and intact distal pulses.           No murmur heard.  Pulmonary/Chest: Breath sounds normal. No respiratory distress. He has no wheezes. He has no rhonchi. He has no rales.   Abdominal: Abdomen is soft. He exhibits no distension. There is no abdominal tenderness. There is no rebound and no guarding.   Musculoskeletal:         General: No edema.      Cervical back: Normal range of motion.     Neurological: He is alert and oriented to person, place, and time.   Cranial nerves 2-12 are intact.  Sensation maintained throughout the entirety of the upper and lower extremities.  There was no dysarthria or dysmetria.  There is no extremity weakness noted, 5/5 in both the upper and lower extremities.  No other obvious focal neurologic deficits are present.   Skin: Skin is warm.   Psychiatric: He has a normal mood and affect. His behavior is normal. Thought content normal.         ED  Course   Procedures  Labs Reviewed   COMPREHENSIVE METABOLIC PANEL - Abnormal       Result Value    Sodium 136      Potassium 4.0      Chloride 106      CO2 24      Glucose 99      BUN 15      Creatinine 1.7 (*)     Calcium 8.1 (*)     Protein Total 4.7 (*)     Albumin 3.0 (*)     Bilirubin Total 0.6      ALP 60      AST 29      ALT 15      Anion Gap 6 (*)     eGFR 43 (*)    CBC WITH DIFFERENTIAL - Abnormal    WBC 1.61 (*)     RBC 1.89 (*)     HGB 7.4 (*)     HCT 20.7 (*)      (*)     MCH 39.2 (*)     MCHC 35.7      RDW 14.2      Platelet Count 35 (*)     MPV        Nucleated RBC 1 (*)    HEPATITIS C ANTIBODY - Normal    Hep C Ab Interp Non-Reactive     HIV 1 / 2 ANTIBODY - Normal    HIV 1/2 Ag/Ab Non-Reactive     TROPONIN I HIGH SENSITIVITY - Normal    Troponin High Sensitive 4     HEP C VIRUS HOLD SPECIMEN   CBC W/ AUTO DIFFERENTIAL    Narrative:     The following orders were created for panel order CBC auto differential.  Procedure                               Abnormality         Status                     ---------                               -----------         ------                     CBC with Differential[[id number]]       Abnormal            Preliminary result         Manual Differential[[id number]]                             In process                   Please view results for these tests on the individual orders.   MANUAL DIFFERENTIAL     EKG Readings: (Independently Interpreted)   Independently interpreted by MD:  Rate 75, NSR, no stemi, no ectopy, no hypertrophy         ECG Results              EKG 12-lead (Final result)        Collection Time Result Time QRS Duration OHS QTC Calculation    07/09/25 14:16:34 07/09/25 16:30:42 72 444                     Final result by Interface, Lab In [facility] (07/09/25 16:30:47)                   Narrative:    Test Reason : R55,    Vent. Rate :  75 BPM     Atrial Rate :  75 BPM     P-R Int : 136 ms          QRS Dur :  72 ms      QT Int : 398 ms       P-R-T  Axes :  47 -37  19 degrees    QTcB Int : 444 ms    Normal sinus rhythm  Possible Left atrial enlargement  Left axis deviation  Minimal voltage criteria for LVH, may be normal variant ( R in aVL )  Abnormal ECG  When compared with ECG of 29-Dec-2024 21:02,  Vent. rate has decreased by  50 bpm  Confirmed by Mitchell Alamo ([id number]) on 7/9/2025 4:30:38 PM    Referred By:            Confirmed By: Mitchell Alamo                                  Imaging Results              CT Cervical Spine Without Contrast (Final result)  Result time 07/09/25 18:31:43      Final result by Bruce Bishop MD (07/09/25 18:31:43)                   Impression:      No CT evidence of cervical spine acute osseous traumatic injury.    Cervical spondylosis including multilevel spinal canal stenosis or neural foraminal narrowing, as further detailed level by level in the body of the report.    Suspected central posterior disc extrusion likely resulting in moderate to severe spinal canal stenosis at C3-4. other incidental findings as discussed in the body of the report.    Electronically signed by resident: Darian Bustillo  Date:    07/09/2025  Time:    17:06    Electronically signed by: Bruce Bishop MD  Date:    07/09/2025  Time:    18:31               Narrative:    EXAMINATION:  CT CERVICAL SPINE WITHOUT CONTRAST    CLINICAL HISTORY:  Neck trauma (Age >= 65y);    TECHNIQUE:  Low dose axial images, sagittal and coronal reformations were performed though the cervical spine.  Contrast was not administered.    COMPARISON:  CT head 07/09/2025, CT chest abdomen pelvis 12/27/2024    FINDINGS:  Hardware: None    Skull base and craniocervical junction (partially imaged): No significant abnormality.    Diffuse osseous demineralization noted.    Spinal alignment: Loss of normal cervical lordosis. No significant spondylolisthesis.    Vertebrae: Anterior and posterior arches of C1 are normal.  Odontoid process is intact.  Chronic appearing minimal anterior wedge of C3  vertebral body.  No evidence of acute vertebral compression fracture.  Several ossified fragments adjacent to the most posterior margin of the C2 spinous process, with circumferentially sclerotic borders and no surrounding soft tissue edema or inflammatory changes, favored to be chronic however no prior imaging for comparison.  No osseous destructive lesion.    Discs: Disc space loss at C6-7 and C7-T1.    Degenerative changes:    C2-C3: No spinal canal stenosis.  Minimal right neural foraminal narrowing.  No significant left neural foraminal narrowing.    C3-C4: Suspected central posterior disc extrusion likely resulting in moderate to severe spinal canal stenosis. Mild left neural foraminal narrowing.    C4-C5: Posterior disc osteophyte complex. Mild spinal canal stenosis. Mild right and moderate left neural foraminal narrowing.    C5-C6: Posterior disc osteophyte complex asymmetric to the left.  Mild spinal canal stenosis. Moderate right neural foraminal narrowing, moderate to severe left neural foraminal narrowing.    C6-C7: Posterior disc osteophyte complex asymmetric to the left.  Mild spinal canal stenosis.  Moderate bilateral neural foraminal narrowing.    C7-T1: Posterior disc osteophyte complex. No significant spinal canal stenosis. No significant neural foraminal narrowing.    Soft tissues: No paraspinal or prevertebral hematomas.  Biapical paraseptal emphysematous changes.  Partially imaged layering left pleural effusion at the left lung apex.  No apical pneumothorax.  Scattered atherosclerotic vascular calcifications noted.  Scattered parenchymal calcifications at the partially imaged bilateral parotid glands, nonspecific.                                       CT Head Without Contrast (Final result)  Result time 07/09/25 15:59:21      Final result by Angel Matthews MD (07/09/25 15:59:21)                   Impression:      No acute intracranial hemorrhage/injury.      Electronically signed by: Angel  Byron  Date:    07/09/2025  Time:    15:59               Narrative:    EXAMINATION:  CT HEAD WITHOUT CONTRAST    CLINICAL HISTORY:  Head trauma, minor (Age >= 65y);    TECHNIQUE:  Low dose axial images were obtained through the head.  Coronal and sagittal reformations were also performed. Contrast was not administered.    COMPARISON:  Report of prior study dated 04/05/2024    FINDINGS:  There is widening of the ventricles and sulci suggestive of underlying volume loss.    No evidence of hydrocephalus, mass effect, intracranial hemorrhage or acute territorial infarct.    The brain parenchyma maintains normal attenuation    No acute calvarial fracture. The visualized sinuses and mastoid air cells are clear.                                       Medications   LIDOcaine 5 % patch 1 patch (1 patch Transdermal Patch Applied 7/9/25 8798)   lactated ringers bolus 500 mL (0 mLs Intravenous Stopped 7/9/25 1809)     Medical Decision Making  Patient is a 69-year-old male who presents per the request of the oncology team secondary to a syncopal episode that occurred yesterday.  Differential diagnosis includes but is not limited to orthostatic hypotension, dehydration, vasovagal, anemia, electrolyte abnormality, progression malignancy, ACS, dysrhythmia, concussion, skull contusion, skull fracture, intracranial hemorrhage, cervical spine fracture.  Vital signs noted to be reassuring and he had a reassuring physical exam on evaluation.  Overall comfortable appearing and only has a minimal tenderness to the palpation of his neck.  I have a low clinical suspicion for intracranial/spinal pathology, however given the etiology of the fall/concern of the oncology team, we will obtain a CT head and cervical spine.  Patient had blood work performed yesterday that was shown to be stable, specifically a CBC/CMP.  Did not receive a troponin yesterday and in consideration of ACS, we will obtain that today.  We will place a Lidoderm patch  over the area of tenderness in his neck.    Persistent pancytopenia noted to be present compared to yesterday's labs.  White cell count/platelets are stable, hemoglobin noted to be slightly decreased from 8 to 7.4.  Still above transfusion threshold.  Creatinine noted to be slightly elevated over the course of the past 2 weeks, now today 1.7.  I believe that there is likely a prerenal component to this end we will give 500 mL of fluid.  The rest of his laboratory workup was shown to be unremarkable.  I spoke to a member of the oncology team on the phone who stated that they felt his labs were stable enough that no acute intervention was needed and recommended he continue to follow up in clinic.  I gave him a referral to see spine in clinic as well.  Stable for discharge at this time.    Amount and/or Complexity of Data Reviewed  External Data Reviewed: notes.  Labs: ordered.    Risk  Prescription drug management.                                      Clinical Impression:  Final diagnoses:  [R55] Syncope  [D61.818] Pancytopenia (Primary)  [M47.9] Spondylosis          ED Disposition Condition    Discharge Stable          ED Prescriptions    None       Follow-up Information       Follow up With Specialties Details Why Contact Info Additional Information    [facility]   [address]  [facility] [id number] [phone] [address]                   [1]   Social History  Tobacco Use    Smoking status: Former     Current packs/day: 0.00     Average packs/day: 0.3 packs/day for 50.0 years (12.5 ttl pk-yrs)     Types: Cigarettes     Start date: 3/1/1973     Quit date: 3/1/2023     Years since quittin.3     Passive exposure: Past    Smokeless tobacco: Never   Substance Use Topics    Alcohol use: Not Currently    Drug use: Never        Darian Astudillo MD  Resident  [phone]

## 2025-07-09 NOTE — PLAN OF CARE
Pt received IV normal saline bolus. Pt tolerated it well. AVS given.  Pt will follow up with MD. Discharged with no acute distress.

## 2025-07-09 NOTE — PROGRESS NOTES
BMT Pharmacist Immunosuppression Note:    Reviewed patient's tacrolimus level with Dr. Hickey and it is within goal range however renal function continues to be elevated. He is receiving fluids today, but with jump in tacrolimus level will proceed with dose adjustment today.     Current regimen: 0.5mg BID  Capsule size(s): 0.5mg    Plan: Decrease regimen to 0.5mg PO daily.       Lab Results   Component Value Date    TACROLIMUS 11.8 07/08/2025    TACROLIMUS 8.1 07/02/2025    TACROLIMUS 9.4 06/25/2025       Creatinine   Date Value Ref Range Status   07/08/2025 1.6 (H) 0.5 - 1.4 mg/dL Final   07/02/2025 1.6 (H) 0.5 - 1.4 mg/dL Final   06/25/2025 1.4 0.5 - 1.4 mg/dL Final     Total Bilirubin   Date Value Ref Range Status   03/17/2025 0.5 0.1 - 1.0 mg/dL Final     Comment:     For infants and newborns, interpretation of results should be based  on gestational age, weight and in agreement with clinical  observations.    Premature Infant recommended reference ranges:  Up to 24 hours.............<8.0 mg/dL  Up to 48 hours............<12.0 mg/dL  3-5 days..................<15.0 mg/dL  6-29 days.................<15.0 mg/dL     03/10/2025 0.4 0.1 - 1.0 mg/dL Final     Comment:     For infants and newborns, interpretation of results should be based  on gestational age, weight and in agreement with clinical  observations.    Premature Infant recommended reference ranges:  Up to 24 hours.............<8.0 mg/dL  Up to 48 hours............<12.0 mg/dL  3-5 days..................<15.0 mg/dL  6-29 days.................<15.0 mg/dL     03/03/2025 0.5 0.1 - 1.0 mg/dL Final     Comment:     For infants and newborns, interpretation of results should be based  on gestational age, weight and in agreement with clinical  observations.    Premature Infant recommended reference ranges:  Up to 24 hours.............<8.0 mg/dL  Up to 48 hours............<12.0 mg/dL  3-5 days..................<15.0 mg/dL  6-29 days.................<15.0 mg/dL        Bilirubin Total   Date Value Ref Range Status   07/08/2025 0.5 0.1 - 1.0 mg/dL Final     Comment:     For infants and newborns, interpretation of results should be based   on gestational age, weight and in agreement with clinical   observations.    Premature Infant recommended reference ranges:   0-24 hours:  <8.0 mg/dL   24-48 hours: <12.0 mg/dL   3-5 days:    <15.0 mg/dL   6-29 days:   <15.0 mg/dL   07/02/2025 0.6 0.1 - 1.0 mg/dL Final     Comment:     For infants and newborns, interpretation of results should be based   on gestational age, weight and in agreement with clinical   observations.    Premature Infant recommended reference ranges:   0-24 hours:  <8.0 mg/dL   24-48 hours: <12.0 mg/dL   3-5 days:    <15.0 mg/dL   6-29 days:   <15.0 mg/dL   06/25/2025 0.5 0.1 - 1.0 mg/dL Final     Comment:     For infants and newborns, interpretation of results should be based   on gestational age, weight and in agreement with clinical   observations.    Premature Infant recommended reference ranges:   0-24 hours:  <8.0 mg/dL   24-48 hours: <12.0 mg/dL   3-5 days:    <15.0 mg/dL   6-29 days:   <15.0 mg/dL     AST   Date Value Ref Range Status   07/08/2025 24 11 - 45 unit/L Final   07/02/2025 21 11 - 45 unit/L Final   06/25/2025 20 11 - 45 unit/L Final   03/17/2025 31 10 - 40 U/L Final   03/10/2025 23 10 - 40 U/L Final   03/03/2025 26 10 - 40 U/L Final     ALT   Date Value Ref Range Status   07/08/2025 13 10 - 44 unit/L Final   07/02/2025 18 10 - 44 unit/L Final   06/25/2025 19 10 - 44 unit/L Final   03/17/2025 35 10 - 44 U/L Final   03/10/2025 31 10 - 44 U/L Final   03/03/2025 36 10 - 44 U/L Final             Alisha Still.D., [facility]  Clinical Pharmacy Specialist, Bone Marrow Transplant/Cellular Therapy  [facility]  [facility]  SpectraLink: [phone]

## 2025-07-10 ENCOUNTER — TELEPHONE (OUTPATIENT)
Dept: ADMINISTRATIVE | Facility: OTHER | Age: 70
End: 2025-07-10
Payer: MEDICARE

## 2025-07-10 ENCOUNTER — PATIENT OUTREACH (OUTPATIENT)
Facility: OTHER | Age: 70
End: 2025-07-10
Payer: MEDICARE

## 2025-07-10 LAB — PATHOLOGIST REVIEW - CBC/DIFF (OHS): NORMAL

## 2025-07-10 NOTE — ED NOTES
Report received from PEDRO PABLO Arredondo. Assume care of pt. Pt resting comfortably and independently repositioned in stretcher with bed locked in lowest position for safety. NAD noted at this time. Respirations even and unlabored and visible chest rise noted.  Patient offered bathroom assistance and denies need at this time. Pt instructed to call if assistance is needed. Pt on continuous cardiac, BP, and O2 monitoring. Call light within reach. Family at bedside. No needs at this time. Will continue to monitor.

## 2025-07-10 NOTE — PROGRESS NOTES
Carley Martinez Patient Care Assistant  ED Navigator  Emergency Department    Project: [facility] [place]  Role: Community Health Worker    Date: 07/10/2025  Patient Name: Guillaume Salinas  MRN: [id number]  PCP: Kal Hargrove MD    Assessment:     Guillaume Salinas is a 69 y.o. male who has presented to ED for a fall. Patient has visited the ED 1 times in the past 3 months. Patient did not contact PCP.     ED Navigator Initial Assessment    ED Navigator Enrollment Documentation  Consent to Services  Does patient consent to completing the assessment?: Yes  Contact  Method of Initial Contact: Phone  Transportation  Insurance Coverage  Do you have coverage/adequate coverage?: Yes  Specialist Appointment  Did the patient come to the ED to see a specialist?: No  Does the patient have a pending specialist referral?: Yes  Does the patient have a specialist appointment made?: Yes  PCP Follow Up Appointment  Medications  Is patient able to afford medication?: Yes  Psychological  Food  Communication/Education  Other Financial Concerns  Other Social Barriers/Concerns  Primary Barrier  Scheduled Appointment Date: 25  Plan: The patient was given food bank/Pantry resources for their region.         Social History     Socioeconomic History    Marital status:    Tobacco Use    Smoking status: Former     Current packs/day: 0.00     Average packs/day: 0.3 packs/day for 50.0 years (12.5 ttl pk-yrs)     Types: Cigarettes     Start date: 3/1/1973     Quit date: 3/1/2023     Years since quittin.3     Passive exposure: Past    Smokeless tobacco: Never   Substance and Sexual Activity    Alcohol use: Not Currently    Drug use: Never    Sexual activity: Not Currently     Partners: Female     Social Drivers of Health     Financial Resource Strain: Medium Risk (7/10/2025)    Overall Financial Resource Strain (CARDIA)     Difficulty of Paying Living Expenses: Somewhat hard   Food Insecurity: Food Insecurity  Present (7/10/2025)    Hunger Vital Sign     Worried About Running Out of Food in the Last Year: Sometimes true     Ran Out of Food in the Last Year: Sometimes true   Transportation Needs: No Transportation Needs (7/10/2025)    PRAPARE - Transportation     Lack of Transportation (Medical): No     Lack of Transportation (Non-Medical): No   Physical Activity: Inactive (7/10/2025)    Exercise Vital Sign     Days of Exercise per Week: 0 days     Minutes of Exercise per Session: 0 min   Stress: No Stress Concern Present (7/10/2025)    [employer] - Occupational Stress Questionnaire     Feeling of Stress : Not at all   Housing Stability: Low Risk  (7/10/2025)    Housing Stability Vital Sign     Unable to Pay for Housing in the Last Year: No     Number of Times Moved in the Last Year: 1     Homeless in the Last Year: No       Plan:       Pt is doing a little better today. Pt agreed to assistance with scheduling with spine services. ED navigator worked with Kenyetta Jo MA and scheduled pt for 8/6/2025 @ 3:00 p.m. with Marcela Figueroa PA-C. This was the next available appt. Pt could use resources, as pt expressed money is tight really often. Pt did not need any other appointments scheduled. ED navigator provided patient with the following via message: food/utility assistance resources, the [facility] 24/7 Nurse triage line, [phone] or 1-866-[facility] ([phone]) contact information, and  education on (The Right Care at the Right Level information, [facility] Virtual [facility] information, and Heart healthy diet tips). ED navigator ensured patient had no other needs at this time. ED navigator reminded patient to reach out if there is ever anything she can assist with. Pt was very thankful for everything today. ED navigator plans to follow-up with patient on/around 7/24/2025.    Carley Martinez  ED Navigator  [phone]

## 2025-07-10 NOTE — DISCHARGE INSTRUCTIONS
Please continue to follow up with your oncology doctors.  Monitor for symptoms such as weakness, fatigue, inability to perform activities of daily living as this could be an indication that the blood level is getting lower.  It is of the utmost importance that you continue to hydrate and drink plenty of water.  Should you develop any new or worsening symptoms, please come back to the emergency department for further evaluation.

## 2025-07-11 ENCOUNTER — HOSPITAL ENCOUNTER (INPATIENT)
Facility: HOSPITAL | Age: 70
LOS: 2 days | Discharge: HOME | DRG: 682 | End: 2025-07-13
Attending: INTERNAL MEDICINE | Admitting: INTERNAL MEDICINE
Payer: MEDICARE

## 2025-07-11 DIAGNOSIS — J18.9 PNEUMONIA OF LEFT LOWER LOBE DUE TO INFECTIOUS ORGANISM: ICD-10-CM

## 2025-07-11 DIAGNOSIS — D46.9 MYELODYSPLASTIC SYNDROME: Primary | ICD-10-CM

## 2025-07-11 DIAGNOSIS — D61.818 PANCYTOPENIA: Primary | ICD-10-CM

## 2025-07-11 DIAGNOSIS — R62.7 FAILURE TO THRIVE IN ADULT: Primary | ICD-10-CM

## 2025-07-11 DIAGNOSIS — R11.0 NAUSEA: ICD-10-CM

## 2025-07-11 DIAGNOSIS — Z94.81 HISTORY OF ALLOGENEIC BONE MARROW TRANSPLANT: ICD-10-CM

## 2025-07-11 DIAGNOSIS — Z94.81 S/P ALLOGENEIC BONE MARROW TRANSPLANT: ICD-10-CM

## 2025-07-11 PROBLEM — G93.31 POSTVIRAL FATIGUE SYNDROME: Status: ACTIVE | Noted: 2025-07-11

## 2025-07-11 LAB
IRON SATN MFR SERPL: 131 % (ref 20–50)
IRON SERPL-MCNC: 244 UG/DL (ref 45–160)
POCT GLUCOSE: 107 MG/DL (ref 70–110)
TIBC SERPL-MCNC: 186 UG/DL (ref 250–450)
TRANSFERRIN SERPL-MCNC: 126 MG/DL (ref 200–375)

## 2025-07-11 PROCEDURE — 25000003 PHARM REV CODE 250: Performed by: INTERNAL MEDICINE

## 2025-07-11 PROCEDURE — 20600001 HC STEP DOWN PRIVATE ROOM

## 2025-07-11 PROCEDURE — 63600175 PHARM REV CODE 636 W HCPCS: Performed by: INTERNAL MEDICINE

## 2025-07-11 PROCEDURE — 84466 ASSAY OF TRANSFERRIN: CPT | Performed by: INTERNAL MEDICINE

## 2025-07-11 RX ORDER — ACETAMINOPHEN 325 MG/1
650 TABLET ORAL
OUTPATIENT
Start: 2025-07-18

## 2025-07-11 RX ORDER — LANOLIN ALCOHOL/MO/W.PET/CERES
800 CREAM (GRAM) TOPICAL
Status: DISCONTINUED | OUTPATIENT
Start: 2025-07-11 | End: 2025-07-13 | Stop reason: HOSPADM

## 2025-07-11 RX ORDER — SODIUM CHLORIDE 0.9 % (FLUSH) 0.9 %
10 SYRINGE (ML) INJECTION
OUTPATIENT
Start: 2025-08-22

## 2025-07-11 RX ORDER — SODIUM CHLORIDE 9 MG/ML
50 INJECTION, SOLUTION INTRAVENOUS CONTINUOUS
OUTPATIENT
Start: 2025-07-25

## 2025-07-11 RX ORDER — POSACONAZOLE 100 MG/1
300 TABLET, DELAYED RELEASE ORAL DAILY
Status: DISCONTINUED | OUTPATIENT
Start: 2025-07-12 | End: 2025-07-13 | Stop reason: HOSPADM

## 2025-07-11 RX ORDER — SODIUM CHLORIDE 9 MG/ML
50 INJECTION, SOLUTION INTRAVENOUS CONTINUOUS
OUTPATIENT
Start: 2025-08-18

## 2025-07-11 RX ORDER — SODIUM CHLORIDE 0.9 % (FLUSH) 0.9 %
10 SYRINGE (ML) INJECTION
OUTPATIENT
Start: 2025-08-08

## 2025-07-11 RX ORDER — SODIUM CHLORIDE 0.9 % (FLUSH) 0.9 %
10 SYRINGE (ML) INJECTION EVERY 12 HOURS PRN
Status: DISCONTINUED | OUTPATIENT
Start: 2025-07-11 | End: 2025-07-13 | Stop reason: HOSPADM

## 2025-07-11 RX ORDER — ACETAMINOPHEN 325 MG/1
650 TABLET ORAL EVERY 4 HOURS PRN
Status: DISCONTINUED | OUTPATIENT
Start: 2025-07-11 | End: 2025-07-13 | Stop reason: HOSPADM

## 2025-07-11 RX ORDER — GLUCAGON 1 MG
1 KIT INJECTION
Status: DISCONTINUED | OUTPATIENT
Start: 2025-07-11 | End: 2025-07-13 | Stop reason: HOSPADM

## 2025-07-11 RX ORDER — CYANOCOBALAMIN 1000 UG/ML
1000 INJECTION, SOLUTION INTRAMUSCULAR; SUBCUTANEOUS WEEKLY
Status: DISCONTINUED | OUTPATIENT
Start: 2025-07-11 | End: 2025-07-13 | Stop reason: HOSPADM

## 2025-07-11 RX ORDER — SODIUM CHLORIDE 9 MG/ML
50 INJECTION, SOLUTION INTRAVENOUS CONTINUOUS
OUTPATIENT
Start: 2025-07-14

## 2025-07-11 RX ORDER — HEPARIN 100 UNIT/ML
500 SYRINGE INTRAVENOUS
OUTPATIENT
Start: 2025-08-25

## 2025-07-11 RX ORDER — ACYCLOVIR 800 MG/1
800 TABLET ORAL 2 TIMES DAILY
Status: DISCONTINUED | OUTPATIENT
Start: 2025-07-11 | End: 2025-07-13 | Stop reason: HOSPADM

## 2025-07-11 RX ORDER — ATOVAQUONE 750 MG/5ML
1500 SUSPENSION ORAL DAILY
Status: DISCONTINUED | OUTPATIENT
Start: 2025-07-12 | End: 2025-07-13 | Stop reason: HOSPADM

## 2025-07-11 RX ORDER — SODIUM,POTASSIUM PHOSPHATES 280-250MG
2 POWDER IN PACKET (EA) ORAL
Status: DISCONTINUED | OUTPATIENT
Start: 2025-07-11 | End: 2025-07-13 | Stop reason: HOSPADM

## 2025-07-11 RX ORDER — HEPARIN 100 UNIT/ML
500 SYRINGE INTRAVENOUS
OUTPATIENT
Start: 2025-08-11

## 2025-07-11 RX ORDER — DIPHENHYDRAMINE HCL 25 MG
25 CAPSULE ORAL
OUTPATIENT
Start: 2025-08-08

## 2025-07-11 RX ORDER — ACETAMINOPHEN 325 MG/1
650 TABLET ORAL
OUTPATIENT
Start: 2025-08-15

## 2025-07-11 RX ORDER — SODIUM CHLORIDE 0.9 % (FLUSH) 0.9 %
10 SYRINGE (ML) INJECTION
OUTPATIENT
Start: 2025-08-01

## 2025-07-11 RX ORDER — DIPHENHYDRAMINE HCL 25 MG
25 CAPSULE ORAL
OUTPATIENT
Start: 2025-07-18

## 2025-07-11 RX ORDER — DIPHENHYDRAMINE HCL 25 MG
25 CAPSULE ORAL
OUTPATIENT
Start: 2025-08-25

## 2025-07-11 RX ORDER — ACETAMINOPHEN 325 MG/1
650 TABLET ORAL
OUTPATIENT
Start: 2025-07-14

## 2025-07-11 RX ORDER — HEPARIN 100 UNIT/ML
500 SYRINGE INTRAVENOUS
OUTPATIENT
Start: 2025-07-25

## 2025-07-11 RX ORDER — ZOLPIDEM TARTRATE 5 MG/1
5 TABLET ORAL NIGHTLY PRN
Status: DISCONTINUED | OUTPATIENT
Start: 2025-07-11 | End: 2025-07-13 | Stop reason: HOSPADM

## 2025-07-11 RX ORDER — DIPHENHYDRAMINE HCL 25 MG
25 CAPSULE ORAL
OUTPATIENT
Start: 2025-07-14

## 2025-07-11 RX ORDER — LEVOFLOXACIN 500 MG/1
500 TABLET, FILM COATED ORAL DAILY
Status: DISCONTINUED | OUTPATIENT
Start: 2025-07-12 | End: 2025-07-12

## 2025-07-11 RX ORDER — SODIUM CHLORIDE 9 MG/ML
50 INJECTION, SOLUTION INTRAVENOUS CONTINUOUS
OUTPATIENT
Start: 2025-08-29

## 2025-07-11 RX ORDER — HEPARIN 100 UNIT/ML
500 SYRINGE INTRAVENOUS
OUTPATIENT
Start: 2025-07-28

## 2025-07-11 RX ORDER — DIPHENHYDRAMINE HCL 25 MG
25 CAPSULE ORAL
OUTPATIENT
Start: 2025-08-15

## 2025-07-11 RX ORDER — ACETAMINOPHEN 325 MG/1
650 TABLET ORAL
OUTPATIENT
Start: 2025-08-08

## 2025-07-11 RX ORDER — IBUPROFEN 200 MG
24 TABLET ORAL
Status: DISCONTINUED | OUTPATIENT
Start: 2025-07-11 | End: 2025-07-13 | Stop reason: HOSPADM

## 2025-07-11 RX ORDER — HEPARIN 100 UNIT/ML
500 SYRINGE INTRAVENOUS
OUTPATIENT
Start: 2025-07-14

## 2025-07-11 RX ORDER — SODIUM CHLORIDE 0.9 % (FLUSH) 0.9 %
10 SYRINGE (ML) INJECTION
OUTPATIENT
Start: 2025-08-04

## 2025-07-11 RX ORDER — SODIUM CHLORIDE 9 MG/ML
50 INJECTION, SOLUTION INTRAVENOUS CONTINUOUS
OUTPATIENT
Start: 2025-08-01

## 2025-07-11 RX ORDER — HEPARIN 100 UNIT/ML
500 SYRINGE INTRAVENOUS
OUTPATIENT
Start: 2025-08-08

## 2025-07-11 RX ORDER — SODIUM CHLORIDE 9 MG/ML
50 INJECTION, SOLUTION INTRAVENOUS CONTINUOUS
OUTPATIENT
Start: 2025-07-28

## 2025-07-11 RX ORDER — ACETAMINOPHEN 325 MG/1
650 TABLET ORAL
OUTPATIENT
Start: 2025-07-11

## 2025-07-11 RX ORDER — ACETAMINOPHEN 325 MG/1
650 TABLET ORAL
OUTPATIENT
Start: 2025-08-29

## 2025-07-11 RX ORDER — SODIUM CHLORIDE 0.9 % (FLUSH) 0.9 %
10 SYRINGE (ML) INJECTION
OUTPATIENT
Start: 2025-09-01

## 2025-07-11 RX ORDER — SODIUM CHLORIDE 0.9 % (FLUSH) 0.9 %
10 SYRINGE (ML) INJECTION
OUTPATIENT
Start: 2025-08-25

## 2025-07-11 RX ORDER — ACETAMINOPHEN 325 MG/1
650 TABLET ORAL
OUTPATIENT
Start: 2025-07-21

## 2025-07-11 RX ORDER — DIPHENHYDRAMINE HCL 25 MG
25 CAPSULE ORAL
OUTPATIENT
Start: 2025-08-01

## 2025-07-11 RX ORDER — NALOXONE HCL 0.4 MG/ML
0.02 VIAL (ML) INJECTION
Status: DISCONTINUED | OUTPATIENT
Start: 2025-07-11 | End: 2025-07-13 | Stop reason: HOSPADM

## 2025-07-11 RX ORDER — SODIUM CHLORIDE 9 MG/ML
50 INJECTION, SOLUTION INTRAVENOUS CONTINUOUS
OUTPATIENT
Start: 2025-07-11

## 2025-07-11 RX ORDER — ACETAMINOPHEN 325 MG/1
650 TABLET ORAL
OUTPATIENT
Start: 2025-08-01

## 2025-07-11 RX ORDER — DIPHENHYDRAMINE HCL 25 MG
25 CAPSULE ORAL
OUTPATIENT
Start: 2025-08-18

## 2025-07-11 RX ORDER — HEPARIN 100 UNIT/ML
500 SYRINGE INTRAVENOUS
OUTPATIENT
Start: 2025-08-18

## 2025-07-11 RX ORDER — SODIUM CHLORIDE 0.9 % (FLUSH) 0.9 %
10 SYRINGE (ML) INJECTION
OUTPATIENT
Start: 2025-08-11

## 2025-07-11 RX ORDER — ACETAMINOPHEN 325 MG/1
650 TABLET ORAL
OUTPATIENT
Start: 2025-08-25

## 2025-07-11 RX ORDER — SODIUM CHLORIDE 9 MG/ML
50 INJECTION, SOLUTION INTRAVENOUS CONTINUOUS
OUTPATIENT
Start: 2025-07-21

## 2025-07-11 RX ORDER — ONDANSETRON HYDROCHLORIDE 2 MG/ML
4 INJECTION, SOLUTION INTRAVENOUS EVERY 8 HOURS PRN
Status: DISCONTINUED | OUTPATIENT
Start: 2025-07-11 | End: 2025-07-13 | Stop reason: HOSPADM

## 2025-07-11 RX ORDER — DIPHENHYDRAMINE HCL 25 MG
25 CAPSULE ORAL
OUTPATIENT
Start: 2025-07-11

## 2025-07-11 RX ORDER — SODIUM CHLORIDE 0.9 % (FLUSH) 0.9 %
10 SYRINGE (ML) INJECTION
OUTPATIENT
Start: 2025-08-15

## 2025-07-11 RX ORDER — HEPARIN 100 UNIT/ML
500 SYRINGE INTRAVENOUS
OUTPATIENT
Start: 2025-08-29

## 2025-07-11 RX ORDER — ACETAMINOPHEN 325 MG/1
650 TABLET ORAL
OUTPATIENT
Start: 2025-09-01

## 2025-07-11 RX ORDER — SODIUM CHLORIDE 0.9 % (FLUSH) 0.9 %
10 SYRINGE (ML) INJECTION
OUTPATIENT
Start: 2025-07-14

## 2025-07-11 RX ORDER — SODIUM CHLORIDE 0.9 % (FLUSH) 0.9 %
10 SYRINGE (ML) INJECTION
OUTPATIENT
Start: 2025-07-11

## 2025-07-11 RX ORDER — DIPHENHYDRAMINE HCL 25 MG
25 CAPSULE ORAL
OUTPATIENT
Start: 2025-08-29

## 2025-07-11 RX ORDER — DIPHENHYDRAMINE HCL 25 MG
25 CAPSULE ORAL
OUTPATIENT
Start: 2025-08-04

## 2025-07-11 RX ORDER — PANTOPRAZOLE SODIUM 40 MG/1
40 TABLET, DELAYED RELEASE ORAL DAILY
Status: DISCONTINUED | OUTPATIENT
Start: 2025-07-12 | End: 2025-07-13 | Stop reason: HOSPADM

## 2025-07-11 RX ORDER — ACETAMINOPHEN 325 MG/1
650 TABLET ORAL
OUTPATIENT
Start: 2025-08-22

## 2025-07-11 RX ORDER — HEPARIN 100 UNIT/ML
500 SYRINGE INTRAVENOUS
OUTPATIENT
Start: 2025-08-15

## 2025-07-11 RX ORDER — SODIUM CHLORIDE 9 MG/ML
50 INJECTION, SOLUTION INTRAVENOUS CONTINUOUS
OUTPATIENT
Start: 2025-08-11

## 2025-07-11 RX ORDER — TAMSULOSIN HYDROCHLORIDE 0.4 MG/1
0.4 CAPSULE ORAL DAILY
Status: DISCONTINUED | OUTPATIENT
Start: 2025-07-12 | End: 2025-07-13 | Stop reason: HOSPADM

## 2025-07-11 RX ORDER — ROPINIROLE 0.25 MG/1
0.5 TABLET, FILM COATED ORAL NIGHTLY
Status: DISCONTINUED | OUTPATIENT
Start: 2025-07-11 | End: 2025-07-13 | Stop reason: HOSPADM

## 2025-07-11 RX ORDER — DIPHENHYDRAMINE HCL 25 MG
25 CAPSULE ORAL
OUTPATIENT
Start: 2025-08-22

## 2025-07-11 RX ORDER — ACETAMINOPHEN 325 MG/1
650 TABLET ORAL
OUTPATIENT
Start: 2025-08-18

## 2025-07-11 RX ORDER — DIPHENHYDRAMINE HCL 25 MG
25 CAPSULE ORAL
OUTPATIENT
Start: 2025-09-01

## 2025-07-11 RX ORDER — TACROLIMUS 0.5 MG/1
0.5 CAPSULE ORAL 2 TIMES DAILY
Status: DISCONTINUED | OUTPATIENT
Start: 2025-07-12 | End: 2025-07-13 | Stop reason: HOSPADM

## 2025-07-11 RX ORDER — GUAIFENESIN 600 MG/1
600 TABLET, EXTENDED RELEASE ORAL 2 TIMES DAILY
Status: DISCONTINUED | OUTPATIENT
Start: 2025-07-11 | End: 2025-07-13 | Stop reason: HOSPADM

## 2025-07-11 RX ORDER — SODIUM CHLORIDE 9 MG/ML
50 INJECTION, SOLUTION INTRAVENOUS CONTINUOUS
OUTPATIENT
Start: 2025-08-15

## 2025-07-11 RX ORDER — DIPHENHYDRAMINE HCL 25 MG
25 CAPSULE ORAL
OUTPATIENT
Start: 2025-07-21

## 2025-07-11 RX ORDER — SODIUM CHLORIDE 9 MG/ML
50 INJECTION, SOLUTION INTRAVENOUS CONTINUOUS
OUTPATIENT
Start: 2025-08-22

## 2025-07-11 RX ORDER — SODIUM CHLORIDE 0.9 % (FLUSH) 0.9 %
10 SYRINGE (ML) INJECTION
OUTPATIENT
Start: 2025-07-28

## 2025-07-11 RX ORDER — CARVEDILOL 3.12 MG/1
3.12 TABLET ORAL 2 TIMES DAILY
Status: DISCONTINUED | OUTPATIENT
Start: 2025-07-11 | End: 2025-07-13 | Stop reason: HOSPADM

## 2025-07-11 RX ORDER — BUDESONIDE 3 MG/1
3 CAPSULE, COATED PELLETS ORAL 3 TIMES DAILY
Status: DISCONTINUED | OUTPATIENT
Start: 2025-07-12 | End: 2025-07-13 | Stop reason: HOSPADM

## 2025-07-11 RX ORDER — HEPARIN 100 UNIT/ML
500 SYRINGE INTRAVENOUS
OUTPATIENT
Start: 2025-07-18

## 2025-07-11 RX ORDER — TACROLIMUS 0.5 MG/1
0.5 CAPSULE ORAL 2 TIMES DAILY
Status: DISCONTINUED | OUTPATIENT
Start: 2025-07-12 | End: 2025-07-13

## 2025-07-11 RX ORDER — HEPARIN 100 UNIT/ML
500 SYRINGE INTRAVENOUS
OUTPATIENT
Start: 2025-08-04

## 2025-07-11 RX ORDER — SODIUM CHLORIDE 0.9 % (FLUSH) 0.9 %
10 SYRINGE (ML) INJECTION
OUTPATIENT
Start: 2025-08-29

## 2025-07-11 RX ORDER — SODIUM CHLORIDE 0.9 % (FLUSH) 0.9 %
10 SYRINGE (ML) INJECTION
OUTPATIENT
Start: 2025-07-25

## 2025-07-11 RX ORDER — IBUPROFEN 200 MG
16 TABLET ORAL
Status: DISCONTINUED | OUTPATIENT
Start: 2025-07-11 | End: 2025-07-13 | Stop reason: HOSPADM

## 2025-07-11 RX ORDER — DIPHENHYDRAMINE HCL 25 MG
25 CAPSULE ORAL
OUTPATIENT
Start: 2025-07-28

## 2025-07-11 RX ORDER — ACETAMINOPHEN 325 MG/1
650 TABLET ORAL
OUTPATIENT
Start: 2025-08-11

## 2025-07-11 RX ORDER — SODIUM CHLORIDE 0.9 % (FLUSH) 0.9 %
10 SYRINGE (ML) INJECTION
OUTPATIENT
Start: 2025-07-21

## 2025-07-11 RX ORDER — SODIUM CHLORIDE 0.9 % (FLUSH) 0.9 %
10 SYRINGE (ML) INJECTION
OUTPATIENT
Start: 2025-07-18

## 2025-07-11 RX ORDER — HEPARIN 100 UNIT/ML
500 SYRINGE INTRAVENOUS
OUTPATIENT
Start: 2025-07-21

## 2025-07-11 RX ORDER — HEPARIN 100 UNIT/ML
500 SYRINGE INTRAVENOUS
OUTPATIENT
Start: 2025-08-01

## 2025-07-11 RX ORDER — SODIUM CHLORIDE 0.9 % (FLUSH) 0.9 %
10 SYRINGE (ML) INJECTION
OUTPATIENT
Start: 2025-08-18

## 2025-07-11 RX ORDER — SODIUM CHLORIDE 9 MG/ML
50 INJECTION, SOLUTION INTRAVENOUS CONTINUOUS
OUTPATIENT
Start: 2025-09-01

## 2025-07-11 RX ORDER — FOLIC ACID 1 MG/1
1 TABLET ORAL DAILY
Status: DISCONTINUED | OUTPATIENT
Start: 2025-07-12 | End: 2025-07-13 | Stop reason: HOSPADM

## 2025-07-11 RX ORDER — SODIUM CHLORIDE 9 MG/ML
50 INJECTION, SOLUTION INTRAVENOUS CONTINUOUS
OUTPATIENT
Start: 2025-07-18

## 2025-07-11 RX ORDER — SODIUM CHLORIDE 9 MG/ML
50 INJECTION, SOLUTION INTRAVENOUS CONTINUOUS
OUTPATIENT
Start: 2025-08-08

## 2025-07-11 RX ORDER — HEPARIN 100 UNIT/ML
500 SYRINGE INTRAVENOUS
OUTPATIENT
Start: 2025-08-22

## 2025-07-11 RX ORDER — HEPARIN 100 UNIT/ML
500 SYRINGE INTRAVENOUS
OUTPATIENT
Start: 2025-09-01

## 2025-07-11 RX ORDER — ALUMINUM HYDROXIDE, MAGNESIUM HYDROXIDE, AND SIMETHICONE 1200; 120; 1200 MG/30ML; MG/30ML; MG/30ML
30 SUSPENSION ORAL 4 TIMES DAILY PRN
Status: DISCONTINUED | OUTPATIENT
Start: 2025-07-11 | End: 2025-07-13 | Stop reason: HOSPADM

## 2025-07-11 RX ORDER — ACETAMINOPHEN 325 MG/1
650 TABLET ORAL
OUTPATIENT
Start: 2025-07-25

## 2025-07-11 RX ORDER — HEPARIN 100 UNIT/ML
500 SYRINGE INTRAVENOUS
OUTPATIENT
Start: 2025-07-11

## 2025-07-11 RX ORDER — ACETAMINOPHEN 325 MG/1
650 TABLET ORAL
OUTPATIENT
Start: 2025-08-04

## 2025-07-11 RX ORDER — DIPHENHYDRAMINE HCL 25 MG
25 CAPSULE ORAL
OUTPATIENT
Start: 2025-08-11

## 2025-07-11 RX ORDER — SODIUM CHLORIDE 9 MG/ML
50 INJECTION, SOLUTION INTRAVENOUS CONTINUOUS
OUTPATIENT
Start: 2025-08-25

## 2025-07-11 RX ORDER — GABAPENTIN 300 MG/1
600 CAPSULE ORAL NIGHTLY PRN
Status: DISCONTINUED | OUTPATIENT
Start: 2025-07-11 | End: 2025-07-13 | Stop reason: HOSPADM

## 2025-07-11 RX ORDER — SODIUM CHLORIDE 9 MG/ML
50 INJECTION, SOLUTION INTRAVENOUS CONTINUOUS
OUTPATIENT
Start: 2025-08-04

## 2025-07-11 RX ORDER — SIMETHICONE 80 MG
1 TABLET,CHEWABLE ORAL 4 TIMES DAILY PRN
Status: DISCONTINUED | OUTPATIENT
Start: 2025-07-11 | End: 2025-07-13 | Stop reason: HOSPADM

## 2025-07-11 RX ORDER — ACETAMINOPHEN 325 MG/1
650 TABLET ORAL
OUTPATIENT
Start: 2025-07-28

## 2025-07-11 RX ORDER — DIPHENHYDRAMINE HCL 25 MG
25 CAPSULE ORAL
OUTPATIENT
Start: 2025-07-25

## 2025-07-11 RX ADMIN — ZOLPIDEM TARTRATE 5 MG: 5 TABLET ORAL at 11:07

## 2025-07-11 RX ADMIN — GUAIFENESIN 600 MG: 600 TABLET, EXTENDED RELEASE ORAL at 10:07

## 2025-07-11 RX ADMIN — CYANOCOBALAMIN 1000 MCG: 1000 INJECTION INTRAMUSCULAR; SUBCUTANEOUS at 10:07

## 2025-07-12 PROBLEM — J18.9 PNEUMONIA OF LEFT LOWER LOBE DUE TO INFECTIOUS ORGANISM: Status: ACTIVE | Noted: 2025-07-12

## 2025-07-12 LAB
ABSOLUTE NEUTROPHIL MANUAL (OHS): 0.6 K/UL
ALBUMIN SERPL BCP-MCNC: 2.8 G/DL (ref 3.5–5.2)
ALP SERPL-CCNC: 58 UNIT/L (ref 40–150)
ALT SERPL W/O P-5'-P-CCNC: 13 UNIT/L (ref 10–44)
ANION GAP (OHS): 6 MMOL/L (ref 8–16)
AST SERPL-CCNC: 24 UNIT/L (ref 11–45)
BILIRUB SERPL-MCNC: 0.6 MG/DL (ref 0.1–1)
BILIRUB UR QL STRIP.AUTO: NEGATIVE
BUN SERPL-MCNC: 12 MG/DL (ref 8–23)
CALCIUM SERPL-MCNC: 8.4 MG/DL (ref 8.7–10.5)
CHLORIDE SERPL-SCNC: 106 MMOL/L (ref 95–110)
CLARITY UR: CLEAR
CO2 SERPL-SCNC: 27 MMOL/L (ref 23–29)
COLOR UR AUTO: COLORLESS
CREAT SERPL-MCNC: 1.6 MG/DL (ref 0.5–1.4)
ERYTHROCYTE [DISTWIDTH] IN BLOOD BY AUTOMATED COUNT: 13.7 % (ref 11.5–14.5)
GFR SERPLBLD CREATININE-BSD FMLA CKD-EPI: 46 ML/MIN/1.73/M2
GLUCOSE SERPL-MCNC: 106 MG/DL (ref 70–110)
GLUCOSE UR QL STRIP: NEGATIVE
HCT VFR BLD AUTO: 21.5 % (ref 40–54)
HGB BLD-MCNC: 7.4 GM/DL (ref 14–18)
HGB UR QL STRIP: ABNORMAL
HOLD SPECIMEN: NORMAL
KETONES UR QL STRIP: NEGATIVE
LEUKOCYTE ESTERASE UR QL STRIP: NEGATIVE
LYMPHOCYTES NFR BLD MANUAL: 18 % (ref 18–48)
MAGNESIUM SERPL-MCNC: 1.9 MG/DL (ref 1.6–2.6)
MCH RBC QN AUTO: 39.2 PG (ref 27–31)
MCHC RBC AUTO-ENTMCNC: 34.4 G/DL (ref 32–36)
MCV RBC AUTO: 114 FL (ref 82–98)
MICROSCOPIC COMMENT: NORMAL
MONOCYTES NFR BLD MANUAL: 14 % (ref 4–15)
NEUTROPHILS NFR BLD MANUAL: 68 % (ref 38–73)
NITRITE UR QL STRIP: NEGATIVE
NUCLEATED RBC (/100WBC) (OHS): 0 /100 WBC
PH UR STRIP: 7 [PH]
PHOSPHATE SERPL-MCNC: 2.7 MG/DL (ref 2.7–4.5)
PLATELET # BLD AUTO: 26 K/UL (ref 150–450)
PLATELET BLD QL SMEAR: ABNORMAL
PMV BLD AUTO: 12.3 FL (ref 9.2–12.9)
POTASSIUM SERPL-SCNC: 4 MMOL/L (ref 3.5–5.1)
PROT SERPL-MCNC: 4.4 GM/DL (ref 6–8.4)
PROT UR QL STRIP: NEGATIVE
RBC # BLD AUTO: 1.89 M/UL (ref 4.6–6.2)
RBC #/AREA URNS AUTO: 1 /HPF (ref 0–4)
SODIUM SERPL-SCNC: 139 MMOL/L (ref 136–145)
SP GR UR STRIP: <1.005
SQUAMOUS #/AREA URNS AUTO: <1 /HPF
UROBILINOGEN UR STRIP-ACNC: NEGATIVE EU/DL
WBC # BLD AUTO: 0.91 K/UL (ref 3.9–12.7)
WBC #/AREA URNS AUTO: 1 /HPF (ref 0–5)

## 2025-07-12 PROCEDURE — 25000003 PHARM REV CODE 250: Performed by: INTERNAL MEDICINE

## 2025-07-12 PROCEDURE — 84100 ASSAY OF PHOSPHORUS: CPT | Performed by: INTERNAL MEDICINE

## 2025-07-12 PROCEDURE — 25000003 PHARM REV CODE 250: Performed by: STUDENT IN AN ORGANIZED HEALTH CARE EDUCATION/TRAINING PROGRAM

## 2025-07-12 PROCEDURE — 87899 AGENT NOS ASSAY W/OPTIC: CPT | Performed by: INTERNAL MEDICINE

## 2025-07-12 PROCEDURE — 20600001 HC STEP DOWN PRIVATE ROOM

## 2025-07-12 PROCEDURE — 63600175 PHARM REV CODE 636 W HCPCS: Performed by: INTERNAL MEDICINE

## 2025-07-12 PROCEDURE — 81003 URINALYSIS AUTO W/O SCOPE: CPT | Performed by: INTERNAL MEDICINE

## 2025-07-12 PROCEDURE — 80053 COMPREHEN METABOLIC PANEL: CPT | Performed by: INTERNAL MEDICINE

## 2025-07-12 PROCEDURE — 94761 N-INVAS EAR/PLS OXIMETRY MLT: CPT

## 2025-07-12 PROCEDURE — 36415 COLL VENOUS BLD VENIPUNCTURE: CPT | Performed by: INTERNAL MEDICINE

## 2025-07-12 PROCEDURE — 99233 SBSQ HOSP IP/OBS HIGH 50: CPT | Mod: ,,, | Performed by: INTERNAL MEDICINE

## 2025-07-12 PROCEDURE — 83735 ASSAY OF MAGNESIUM: CPT | Performed by: INTERNAL MEDICINE

## 2025-07-12 PROCEDURE — 87449 NOS EACH ORGANISM AG IA: CPT | Performed by: INTERNAL MEDICINE

## 2025-07-12 PROCEDURE — 85007 BL SMEAR W/DIFF WBC COUNT: CPT | Performed by: INTERNAL MEDICINE

## 2025-07-12 RX ORDER — ELTROMBOPAG 50 MG/1
100 TABLET, FILM COATED ORAL DAILY
Status: DISCONTINUED | OUTPATIENT
Start: 2025-07-12 | End: 2025-07-13 | Stop reason: HOSPADM

## 2025-07-12 RX ORDER — CEFTRIAXONE 1 G/1
1 INJECTION, POWDER, FOR SOLUTION INTRAMUSCULAR; INTRAVENOUS
Status: DISCONTINUED | OUTPATIENT
Start: 2025-07-12 | End: 2025-07-13 | Stop reason: HOSPADM

## 2025-07-12 RX ORDER — LEVOFLOXACIN 250 MG/1
250 TABLET, FILM COATED ORAL DAILY
Status: DISCONTINUED | OUTPATIENT
Start: 2025-07-13 | End: 2025-07-13 | Stop reason: HOSPADM

## 2025-07-12 RX ORDER — SODIUM CHLORIDE 9 MG/ML
INJECTION, SOLUTION INTRAVENOUS CONTINUOUS
Status: DISCONTINUED | OUTPATIENT
Start: 2025-07-12 | End: 2025-07-13 | Stop reason: HOSPADM

## 2025-07-12 RX ADMIN — PANTOPRAZOLE SODIUM 40 MG: 40 TABLET, DELAYED RELEASE ORAL at 08:07

## 2025-07-12 RX ADMIN — CARVEDILOL 3.12 MG: 3.12 TABLET, FILM COATED ORAL at 09:07

## 2025-07-12 RX ADMIN — ELTROMBOPAG OLAMINE 100 MG: 50 TABLET, FILM COATED ORAL at 03:07

## 2025-07-12 RX ADMIN — ZOLPIDEM TARTRATE 5 MG: 5 TABLET ORAL at 09:07

## 2025-07-12 RX ADMIN — ROPINIROLE HYDROCHLORIDE 0.5 MG: 0.25 TABLET, FILM COATED ORAL at 09:07

## 2025-07-12 RX ADMIN — TACROLIMUS 0.5 MG: 0.5 CAPSULE ORAL at 08:07

## 2025-07-12 RX ADMIN — ATOVAQUONE ORAL SUSPENSION 1500 MG: 750 SUSPENSION ORAL at 08:07

## 2025-07-12 RX ADMIN — BUDESONIDE 3 MG: 3 CAPSULE, COATED PELLETS ORAL at 09:07

## 2025-07-12 RX ADMIN — CEFTRIAXONE 1 G: 1 INJECTION, POWDER, FOR SOLUTION INTRAMUSCULAR; INTRAVENOUS at 11:07

## 2025-07-12 RX ADMIN — ACYCLOVIR 800 MG: 800 TABLET ORAL at 08:07

## 2025-07-12 RX ADMIN — GUAIFENESIN 600 MG: 600 TABLET, EXTENDED RELEASE ORAL at 09:07

## 2025-07-12 RX ADMIN — BUDESONIDE 3 MG: 3 CAPSULE, COATED PELLETS ORAL at 03:07

## 2025-07-12 RX ADMIN — LEVOFLOXACIN 500 MG: 500 TABLET, FILM COATED ORAL at 08:07

## 2025-07-12 RX ADMIN — CARVEDILOL 3.12 MG: 3.12 TABLET, FILM COATED ORAL at 08:07

## 2025-07-12 RX ADMIN — BUDESONIDE 3 MG: 3 CAPSULE, COATED PELLETS ORAL at 08:07

## 2025-07-12 RX ADMIN — SODIUM CHLORIDE, POTASSIUM CHLORIDE, SODIUM LACTATE AND CALCIUM CHLORIDE 1000 ML: 600; 310; 30; 20 INJECTION, SOLUTION INTRAVENOUS at 12:07

## 2025-07-12 RX ADMIN — FILGRASTIM 300 MCG: 300 INJECTION, SOLUTION INTRAVENOUS; SUBCUTANEOUS at 03:07

## 2025-07-12 RX ADMIN — GUAIFENESIN 600 MG: 600 TABLET, EXTENDED RELEASE ORAL at 08:07

## 2025-07-12 RX ADMIN — POSACONAZOLE 300 MG: 100 TABLET, DELAYED RELEASE ORAL at 08:07

## 2025-07-12 RX ADMIN — TAMSULOSIN HYDROCHLORIDE 0.4 MG: 0.4 CAPSULE ORAL at 08:07

## 2025-07-12 RX ADMIN — FOLIC ACID 1 MG: 1 TABLET ORAL at 08:07

## 2025-07-12 RX ADMIN — ACYCLOVIR 800 MG: 800 TABLET ORAL at 09:07

## 2025-07-12 RX ADMIN — SODIUM CHLORIDE: 0.9 INJECTION, SOLUTION INTRAVENOUS at 10:07

## 2025-07-12 NOTE — ASSESSMENT & PLAN NOTE
Patient with Acute on chronic debility due to neoplastic/malignant related fatigue. The patient's latest AMPAC (Activity Measure for Post Acute Care) Score is listed below.    AM-PAC Score - How much help does the patient need for each activity listed       Plan  - Progressive mobility protocol initated  - PT/OT

## 2025-07-12 NOTE — ASSESSMENT & PLAN NOTE
This patient is found to have pancytopenia, the likely etiology is Malignancy s/p BM xplant, will monitor CBC Daily. Will transfuse red blood cells if the hemoglobin is <7g/dL (or <8 in the setting of ACS). Will transfuse platelets if platelet count is <10k. Hold DVT prophylaxis if platelets are <50k. The patient's hemoglobin, white blood cell count, and platelet count results have been reviewed and are listed below.  Recent Labs   Lab 07/11/25  1515   HGB 8.6*   WBC 1.38*   PLT 34*   -iron studies ordered    He has no evidence of infection.

## 2025-07-12 NOTE — HPI
"Patient is a 69-year-old Belizean speaking male with a past medical history of hypertension, CAD a myelodysplastic syndrome now s/p haploidentical stem cell transplantation who presents per the request of his oncology doctor secondary to a syncopal episode that occurred 7/8.  Patient states he was standing/urinating when he suddenly lost consciousness and fell down/hit his left side/the left elbow.  He does not believe that he hit his head.  He does endorse having some neck discomfort since the fall however.  He denies any fevers, chills. Over the last three weeks since he finished his steroids and two days later starting have dizzy spells, pressure issues, fatigue, productive cough, congestion, one episode of vomiting, and diarrhea. Yesterday we swabbed him in clinic and he tested positive for rhinovirus/enterovirus. The diarrhea has not been every day and he started taking imodium when it does occur. His wife reports that he fell while in the bathroom into his bathtub. Patient states he did not his head but did "black out for a few seconds". He has some deconditioning but overall stable. Denies diarrhea and rash. He reports "fatigue" that makes him lose his appetite. His last chemo was September 2024 prior to his transplant.   No rashes.   No mouth or throat pain or sores.       Family and patient were offered and declined a .       Transplant History:   Primary oncologist: Paco Hickey MD  Primary oncologic diagnosis: MDS  Transplant date: 9/19/2024  Donor: haploidentical  Blood Type (Patient): B +  Blood Type (Donor): A +  CMV (Patient): Positive  CMV (Donor): Positive  Graft source: Bone marrow  CD34+ cell dose: 3.55x10^6  Conditioning Regimen: Fludarabine plus melphalan 100 mg/m2 + 2Gy TBI  GVHD prophylaxis: Post-transplant cyclophosphamide, Tacrolimus, MMF  Immediate post-transplant complications: Patient experienced expected GI toxicities with C diff negative diarrhea and nausea, neutropenic fever " with negative infectious work up, expected cytopenias requiring transfusions, electrolyte abnormalities requiring replacement, volume overload requiring diuresis, intermittent SOB from pulmonary edema requiring 02, and a hemorrhoid flare up. Diarrhea and SOB improved towards the end of the hospital stay.

## 2025-07-12 NOTE — HPI
"Guillaume Salinas (Guillaume) is 69 with a past medical history of MDS who is status post haploidentical stem cell transplantation conditioned with FluMel 100 + 2Gy TBI who was seen on day +293 for post-transplant follow up. He has not been doing great the past three weeks. He finished his steroids and two days later starting have dizzy spells, pressure issues, fatigue, productive cough, congestion, one episode of vomiting, and diarrhea. Yesterday we swabbed him in clinic and he tested positive for rhinovirus/enterovirus. The diarrhea has not been every day and he started taking imodium when it does occur. His wife reports yesterday that he fell while in the bathroom into his bathtub. Patient states he did not his head but did "black out for a few seconds". He has some deconditioning but overall stable. Denies diarrhea and rash.  "

## 2025-07-12 NOTE — PLAN OF CARE
Plan of care reviewed with pt and wife. Pt involved in plan of care and communicating needs throughout shift. AAOx4. Up in room and to bathroom independently; voiding without difficulty. Tolerating diet. No c/o pain. All VSS. Pt remaining free from falls or injury throughout shift. Bed locked and in lowest position, personal belongings and call light within reach, non skid socks on when OOB. Wife at bedside. Pt instructed to call for assistance as needed. Hourly rounding done on pt.

## 2025-07-12 NOTE — ASSESSMENT & PLAN NOTE
Multifactorial. Recently had B12, folate, copper deficiencies, which have resolved. May have reduced allograft function due to prior medical complications.   - Continue home eltrombopag  - Administer G-CSF while inpatient and ANC is less than 1000  - Monitor closely and transfuse as clinically indicated.

## 2025-07-12 NOTE — ASSESSMENT & PLAN NOTE
Recent enterovirus, and rhinovirus.   As far as fatigue goes, he also has anemia  Fatigue is his main complaint and impacts his activity, appetite

## 2025-07-12 NOTE — ASSESSMENT & PLAN NOTE
- Day +296 of  haploBMT for MDS with PTCy/MMF/tacro GVHD ppx  - Last bone marrow biopsy on 6/9/2025 showed normocellular marrow with mild dyserythropoiesis, diploid cytogenetics, and full donor chimerism.   - He has cytopenias due to transplant. See separate discussion.   - No evidence of aGVHD or cGVHD

## 2025-07-12 NOTE — H&P
"[facility] - Oncology ([facility])  [facility]  History & Physical    Patient Name: Guillaume Salinas  MRN: [id number]  Admission Date: 7/11/2025  Attending Physician: Jose M Kruse MD  Primary Care Provider: Kal Hargrove MD         Patient information was obtained from patient, spouse/SO, relative(s), past medical records, ER records, and primary team.       Subjective:     Principal Problem:Pancytopenia    Chief Complaint: No chief complaint on file.       HPI: Patient is a 69-year-old [place]uran speaking male with a past medical history of hypertension, CAD a myelodysplastic syndrome now s/p haploidentical stem cell transplantation who presents per the request of his oncology doctor secondary to a syncopal episode that occurred 7/8.  Patient states he was standing/urinating when he suddenly lost consciousness and fell down/hit his left side/the left elbow.  He does not believe that he hit his head.  He does endorse having some neck discomfort since the fall however.  He denies any fevers, chills. Over the last three weeks since he finished his steroids and two days later starting have dizzy spells, pressure issues, fatigue, productive cough, congestion, one episode of vomiting, and diarrhea. Yesterday we swabbed him in clinic and he tested positive for rhinovirus/enterovirus. The diarrhea has not been every day and he started taking imodium when it does occur. His wife reports that he fell while in the bathroom into his bathtub. Patient states he did not his head but did "black out for a few seconds". He has some deconditioning but overall stable. Denies diarrhea and rash. He reports "fatigue" that makes him lose his appetite. His last chemo was September 2024 prior to his transplant.   No rashes.   No mouth or throat pain or sores.       Family and patient were offered and declined a .       Transplant History:   Primary oncologist: Paco Hickey MD  Primary oncologic diagnosis: " MDS  Transplant date: 9/19/2024  Donor: haploidentical  Blood Type (Patient): B +  Blood Type (Donor): A +  CMV (Patient): Positive  CMV (Donor): Positive  Graft source: Bone marrow  CD34+ cell dose: 3.55x10^6  Conditioning Regimen: Fludarabine plus melphalan 100 mg/m2 + 2Gy TBI  GVHD prophylaxis: Post-transplant cyclophosphamide, Tacrolimus, MMF  Immediate post-transplant complications: Patient experienced expected GI toxicities with C diff negative diarrhea and nausea, neutropenic fever with negative infectious work up, expected cytopenias requiring transfusions, electrolyte abnormalities requiring replacement, volume overload requiring diuresis, intermittent SOB from pulmonary edema requiring 02, and a hemorrhoid flare up. Diarrhea and SOB improved towards the end of the hospital stay.              Past Medical History:   Diagnosis Date    Anticoagulant long-term use     Coronary artery disease     Hypertension     Myelodysplastic syndrome     Peripheral vascular disease, unspecified        Past Surgical History:   Procedure Laterality Date    BONE MARROW BIOPSY Left 4/26/2023    Procedure: Biopsy-bone marrow;  Surgeon: Harry Diamond MD;  Location: [facility];  Service: Oncology;  Laterality: Left;    COLONOSCOPY N/A 01/05/2022    Procedure: COLONOSCOPY Golytely Vaccinated will bring cards;  Surgeon: Dereje Simon MD;  Location: [facility];  Service: Endoscopy;  Laterality: N/A;  Do not cancel this order    ESOPHAGOGASTRODUODENOSCOPY N/A 5/8/2025    Procedure: EGD (ESOPHAGOGASTRODUODENOSCOPY);  Surgeon: Dudley Toth MD;  Location: [facility];  Service: Endoscopy;  Laterality: N/A;  ref by Gilma Ugalde PA-C,portal-ae    INSERTION OF LEMONS CATHETER Right 9/13/2024    Procedure: INSERTION, CATHETER, CENTRAL VENOUS, LEMONS TRIPLE LUMEN;  Surgeon: Kg Patten MD;  Location: [address];  Service: General;  Laterality: Right;       Review of patient's allergies indicates:  No Known  Allergies    No current facility-administered medications on file prior to encounter.     Current Outpatient Medications on File Prior to Encounter   Medication Sig    acyclovir (ZOVIRAX) 800 MG Tab Take 1 tablet (800 mg total) by mouth 2 (two) times daily.    atovaquone (MEPRON) 750 mg/5 mL Susp oral liquid Take 10 mLs (1,500 mg total) by mouth once daily.    budesonide (ENTOCORT EC) 3 mg capsule Take 1 capsule (3 mg total) by mouth 3 (three) times daily.    carvediloL (COREG) 3.125 MG tablet Take 1 tablet (3.125 mg total) by mouth 2 (two) times daily.    cyanocobalamin 1,000 mcg/mL injection Inject 1 mL (1,000 mcg total) into the muscle once a week.    eltrombopag olamine (PROMACTA) 50 MG Tab Take 2 tablets (100 mg total) by mouth once daily.    folic acid (FOLVITE) 1 MG tablet Take 1 tablet (1 mg total) by mouth once daily.    gabapentin (NEURONTIN) 300 MG capsule Take 2 capsules (600 mg total) by mouth nightly as needed (Restless leg).    guaiFENesin (MUCINEX) 600 mg 12 hr tablet Take 1 tablet (600 mg total) by mouth 2 (two) times daily.    hydrocortisone 1 % cream Apply to affected area 2 times daily    letermovir (PREVYMIS) 480 mg Tab Take 1 tablet (480 mg total) by mouth Daily.    levoFLOXacin (LEVAQUIN) 500 MG tablet Take 1 tablet (500 mg total) by mouth once daily.    loperamide (IMODIUM A-D) 2 mg Tab Take 2 mg by mouth 4 (four) times daily as needed.    LORazepam (ATIVAN) 1 MG tablet Take 1 tablet (1 mg total) by mouth once. for 1 dose    magnesium oxide (MAG-OX) 400 mg (241.3 mg magnesium) tablet Take 2 tablets (800 mg total) by mouth 2 (two) times daily.    ondansetron (ZOFRAN-ODT) 8 MG TbDL Take 1 tablet (8 mg total) by mouth every 8 (eight) hours as needed (nausea).    pantoprazole (PROTONIX) 40 MG tablet Take 1 tablet (40 mg total) by mouth once daily.    posaconazole (NOXAFIL) 100 mg TbEC tablet Take 3 tablets (300 mg total) by mouth once daily.    rOPINIRole (REQUIP) 0.5 MG tablet Take 1 tablet (0.5  mg total) by mouth every evening.    tacrolimus (PROGRAF) 0.5 MG Cap Take 1 capsule (0.5 mg total) by mouth every morning AND 1 capsule (0.5 mg total) every evening.    tamsulosin (FLOMAX) 0.4 mg Cap Take 1 capsule (0.4 mg total) by mouth once daily.    UNABLE TO FIND Take by mouth once daily. medication name: copper    zolpidem (AMBIEN) 5 MG Tab Take 1 tablet (5 mg total) by mouth nightly as needed (insomnia).     Family History       Problem Relation (Age of Onset)    Cancer Father, Brother    Hypertension Mother    No Known Problems Daughter, Daughter, Son          Tobacco Use    Smoking status: Former     Current packs/day: 0.00     Average packs/day: 0.3 packs/day for 50.0 years (12.5 ttl pk-yrs)     Types: Cigarettes     Start date: 3/1/1973     Quit date: 3/1/2023     Years since quittin.3     Passive exposure: Past    Smokeless tobacco: Never   Substance and Sexual Activity    Alcohol use: Not Currently    Drug use: Never    Sexual activity: Not Currently     Partners: Female     Review of Systems   Constitutional:  Positive for activity change, appetite change and fatigue. Negative for chills, diaphoresis, fever and unexpected weight change.   HENT:  Negative for congestion, ear pain, hearing loss, mouth sores, nosebleeds, rhinorrhea, sinus pressure, sore throat and trouble swallowing.    Eyes: Negative.    Respiratory: Negative.     Cardiovascular: Negative.    Gastrointestinal:  Positive for diarrhea, nausea and vomiting. Negative for abdominal pain and blood in stool.   Endocrine: Negative.    Genitourinary:  Positive for frequency.   Musculoskeletal: Negative.  Negative for arthralgias and myalgias.   Skin: Negative.    Neurological:  Positive for dizziness, syncope, weakness, light-headedness and headaches. Negative for tremors, seizures and speech difficulty.   Psychiatric/Behavioral: Negative.       Objective:     Vital Signs (Most Recent):  Temp: 97.4 °F (36.3 °C) (25)  Pulse: 77  (07/11/25 2021)  Resp: 18 (07/11/25 2021)  BP: (!) 150/76 (07/11/25 2021)  SpO2: 96 % (07/11/25 2021) Vital Signs (24h Range):  Temp:  [97.4 °F (36.3 °C)] 97.4 °F (36.3 °C)  Pulse:  [77] 77  Resp:  [18] 18  SpO2:  [96 %] 96 %  BP: (150)/(76) 150/76        There is no height or weight on file to calculate BMI.     Physical Exam  Constitutional:       General: He is not in acute distress.     Appearance: Normal appearance. He is normal weight. He is not ill-appearing, toxic-appearing or diaphoretic.   HENT:      Head: Normocephalic.   Eyes:      General: No scleral icterus.        Right eye: No discharge.         Left eye: No discharge.      Conjunctiva/sclera: Conjunctivae normal.   Pulmonary:      Effort: Pulmonary effort is normal. No respiratory distress.   Musculoskeletal:      Cervical back: Normal range of motion. No rigidity.   Skin:     General: Skin is dry.      Coloration: Skin is not jaundiced.      Findings: Bruising (where blood was drawn) present.   Neurological:      General: No focal deficit present.      Mental Status: He is alert and oriented to person, place, and time.      Cranial Nerves: No cranial nerve deficit.   Psychiatric:         Mood and Affect: Mood normal.         Behavior: Behavior normal.         Thought Content: Thought content normal.         Judgment: Judgment normal.                Significant Labs: All pertinent labs within the past 24 hours have been reviewed.  Recent Lab Results         07/11/25  1515        Unit Blood Type Code 5100  [P]       Unit Expiration [id number]  [P]       Albumin 3.2       ALP 63       ALT 16       Anion Gap 6       Aniso Slight       AST 26       BILIRUBIN TOTAL 0.8  Comment: For infants and newborns, interpretation of results should be based   on gestational age, weight and in agreement with clinical   observations.    Premature Infant recommended reference ranges:   0-24 hours:  <8.0 mg/dL   24-48 hours: <12.0 mg/dL   3-5 days:    <15.0 mg/dL    6-29 days:   <15.0 mg/dL       BUN 12       Calcium 8.2       Chloride 104       CO2 30       Creatinine 1.7       Crossmatch Interpretation Compatible  [P]       DISPENSE STATUS Selected  [P]       eGFR 43  Comment: Estimated GFR calculated using the CKD-EPI creatinine (2021) equation.       Fragmented Cells Occasional       Glucose 107       Gran # (ANC) 0.8       Group & Rh B POS       Hematocrit 25.6       Hemoglobin 8.6       INDIRECT TYLOR NEG       Lactate Dehydrogenase 277       Lymph % 27.0       Magnesium  2.0       MCH 39.3       MCHC 33.6              Mono % 18.0       MPV 12.4       nRBC 0       Phosphorus Level 3.3       Platelet Estimate Decreased       Platelet Count 34       Poikilocytosis Slight       Potassium 4.0       Product Code U3660D20  [P]       PROTEIN TOTAL 5.2       RBC 2.19       RDW 14.2       Segmented Neutrophil % 55.0       Sodium 140       Specimen Outdate 07/14/2025 23:59       Unit ABO/Rh O POS  [P]       UNIT NUMBER [id number]  [P]       Uric Acid 5.3       WBC 1.38                [P] - Preliminary Result               Significant Imaging: I have reviewed all pertinent imaging results/findings within the past 24 hours.none  Assessment/Plan:     Assessment & Plan  Pancytopenia  This patient is found to have pancytopenia, the likely etiology is Malignancy s/p BM xplant, will monitor CBC Daily. Will transfuse red blood cells if the hemoglobin is <7g/dL (or <8 in the setting of ACS). Will transfuse platelets if platelet count is <10k. Hold DVT prophylaxis if platelets are <50k. The patient's hemoglobin, white blood cell count, and platelet count results have been reviewed and are listed below.  Recent Labs   Lab 07/11/25  1515   HGB 8.6*   WBC 1.38*   PLT 34*   -iron studies ordered    He has no evidence of infection.         Physical debility  Patient with Acute on chronic debility due to neoplastic/malignant related fatigue. The patient's latest [facility] (Activity  Measure for Post Acute Care) Score is listed below.    AM-PAC Score - How much help does the patient need for each activity listed       Plan  - Progressive mobility protocol initated  - PT/OT        Postviral fatigue syndrome  Recent enterovirus, and rhinovirus.   As far as fatigue goes, he also has anemia  Fatigue is his main complaint and impacts his activity, appetite    Myelodysplastic syndrome  Chronic, stable with pancytopenia.   -cont proph with levofloxacin, acyclovir, posaconazole  -no signs of GVHD      S/P allogeneic bone marrow transplant  On proph with pancytopenia, levofloxacin, acyclovir, posaconazole, no signs of GVHD.    Urinary frequency  Improved with flomax.   -cont flomax    Hypertension, essential  Patient's blood pressure range in the last 24 hours was: BP  Min: 150/76  Max: 150/76.The patient's inpatient anti-hypertensive regimen is listed below:  Current Antihypertensives  carvediloL tablet 3.125 mg, 2 times daily, Oral    Plan  - BP is controlled, no changes needed to their regimen  - monitro, cont coreg  Coronary artery disease involving native coronary artery of native heart without angina pectoris  Chronic stable without ACS  -cont coreg    VTE Risk Mitigation (From admission, onward)           Ordered     Reason for No Pharmacological VTE Prophylaxis  Once        Question:  Reasons:  Answer:  Thrombocytopenia    07/11/25 2143     IP VTE HIGH RISK PATIENT  Once         07/11/25 2143     Place sequential compression device  Until discontinued         07/11/25 2143                                     The attending portion of this evaluation, treatment, and documentation was performed per Justyn Clark Jr, MD via Telemedicine AudioVisual using the secure [employer] software platform with 2 way audio/video. The provider was located off-site and the patient is located in the hospital. The aforementioned video software was utilized to document the relevant history and physical exam            Justyn  Eduardo Plascencia MD  Department of Hospital Medicine   [facility] - Oncology ([facility])

## 2025-07-12 NOTE — ASSESSMENT & PLAN NOTE
Continue tamsulosin. Noting this drug may cause orthostasis, consider holding if he has recurrent syncopal events.

## 2025-07-12 NOTE — SUBJECTIVE & OBJECTIVE
Subjective:     Interval History: Day +296 of  haploBMT for MDS. Mr. Meyer reports feeling okay overall today and desires to return home. Denies dyspnea. He has had cough recently. No worsening rash, except stable erythema on face. No diarrhea or nausea.     Objective:     Vital Signs (Most Recent):  Temp: 98.1 °F (36.7 °C) (07/12/25 1128)  Pulse: 75 (07/12/25 1128)  Resp: 17 (07/12/25 1128)  BP: (!) 143/80 (07/12/25 1128)  SpO2: (!) 94 % (07/12/25 1128) Vital Signs (24h Range):  Temp:  [97.4 °F (36.3 °C)-98.3 °F (36.8 °C)] 98.1 °F (36.7 °C)  Pulse:  [74-78] 75  Resp:  [17-19] 17  SpO2:  [91 %-96 %] 94 %  BP: (121-150)/(71-80) 143/80     Weight: 67.8 kg (149 lb 7.6 oz)  Body mass index is 22.73 kg/m².  Body surface area is 1.8 meters squared.    ECOG SCORE           [unfilled]    Intake/Output - Last 3 Shifts         07/10 0700  07/11 0659 07/11 0700 07/12 0659 07/12 0700  07/13 0659    Urine (mL/kg/hr)  150     Total Output  150     Net  -150                     Physical Exam  Constitutional:       General: He is not in acute distress.     Appearance: He is well-developed.   HENT:      Head: Normocephalic and atraumatic.      Mouth/Throat:      Mouth: No oral lesions.   Eyes:      Conjunctiva/sclera: Conjunctivae normal.   Neck:      Thyroid: No thyromegaly.   Cardiovascular:      Rate and Rhythm: Normal rate and regular rhythm.      Heart sounds: Normal heart sounds. No murmur heard.  Pulmonary:      Breath sounds: Normal breath sounds. No wheezing or rales.   Abdominal:      General: There is no distension.      Palpations: Abdomen is soft. There is no hepatomegaly, splenomegaly or mass.      Tenderness: There is no abdominal tenderness.   Lymphadenopathy:      Cervical: No cervical adenopathy.      Right cervical: No deep cervical adenopathy.     Left cervical: No deep cervical adenopathy.   Skin:     Findings: Erythema (on face only) present. No rash.   Neurological:      Mental Status: He is alert  and oriented to person, place, and time.      Cranial Nerves: No cranial nerve deficit.      Coordination: Coordination normal.      Deep Tendon Reflexes: Reflexes are normal and symmetric.            Significant Labs:   All pertinent labs from the last 24 hours have been reviewed.    Diagnostic Results:  I have reviewed and interpreted all pertinent imaging results/findings within the past 24 hours.   No

## 2025-07-12 NOTE — PROGRESS NOTES
Pharmacist Renal Dose Adjustment Note    Guillaume Salinas is a 69 y.o. male being treated with the medication levofloxacin.    Patient Data:    Vital Signs (Most Recent):  Temp: 98.2 °F (36.8 °C) (07/12/25 0507)  Pulse: 74 (07/12/25 0838)  Resp: 18 (07/12/25 0836)  BP: 121/71 (07/12/25 0838)  SpO2: (!) 91 % (07/12/25 0836) Vital Signs (72h Range):  Temp:  [97.4 °F (36.3 °C)-98.3 °F (36.8 °C)]   Pulse:  [74-78]   Resp:  [18-19]   BP: (121-150)/(71-80)   SpO2:  [91 %-96 %]      Recent Labs   Lab 07/09/25  1600 07/11/25  1515 07/12/25  0233   CREATININE 1.7* 1.7* 1.6*     Serum creatinine: 1.6 mg/dL (H) 07/12/25 0233  Estimated creatinine clearance: 41.7 mL/min (A)    Medication: Levofloxacin 500 mg QD was renally dose adjusted to Levofloxacin 250 mg daily.     Pharmacist's Name: Mildred Aguila

## 2025-07-12 NOTE — HOSPITAL COURSE
07/12/2025 Feeling okay overall. Reports loose stools but no diarrhea. Denies dyspnea. No nausea. CT chest ordered due to abnormal CXR. IV ceftriaxone given for possible superimposed bacterial pneumonia. IV fluids for ADDISON.   07/13/2025 Patient doing very well and remains afebrile. Does note some slight dyspnea however remains on RA. His CT revealed a left sided pleural effusion and pulmonology consulted however will plan on interval imaging as an outpatient. He was subsequently discharged for OP follow up with pulm and BMT as scheduled. Plan discussed with patient and/or family. Patient and/or family are in agreement with plan, verbalized understanding, and all questions were answered.       Vitals:    07/13/25 0359 07/13/25 0842 07/13/25 0844 07/13/25 1121   BP: (!) 146/78 126/75 126/75 120/60   BP Location: Right arm Right arm     Patient Position: Lying Lying  Lying   Pulse: 78 81 81 78   Resp: 18 18  16   Temp: 97.7 °F (36.5 °C) 98.2 °F (36.8 °C)  98.4 °F (36.9 °C)   TempSrc: Oral Oral  Oral   SpO2: (!) 94% (!) 93%  (!) 92%   Weight:       Height:

## 2025-07-12 NOTE — ASSESSMENT & PLAN NOTE
Chronic, stable with pancytopenia.   -cont proph with levofloxacin, acyclovir, posaconazole  -no signs of GVHD

## 2025-07-12 NOTE — ASSESSMENT & PLAN NOTE
Patient's blood pressure range in the last 24 hours was: BP  Min: 150/76  Max: 150/76.The patient's inpatient anti-hypertensive regimen is listed below:  Current Antihypertensives  carvediloL tablet 3.125 mg, 2 times daily, Oral    Plan  - BP is controlled, no changes needed to their regimen  - bird, cont coreg

## 2025-07-12 NOTE — NURSING
Pt was a direct admit from the clinic. Presenting with fatigue, not feeling well, abnormal labs, and had a fall recently at home.   -S/P Day+296 Allo SCT  -UA sent   -CXR completed  -LR bolus   -Ambien given for Insomnia  -Bed alarm set and active         Vitals Value Taken Time   /76 07/11/25 20:21   Temp 97.4 °F (36.3 °C) 07/11/25 20:21   Pulse 77 07/11/25 20:21   Resp 18 07/11/25 20:21   SpO2 96 % 07/11/25 20:21     Vitals Value Taken Time   /80 07/11/25 22:43   Temp 98.3 °F (36.8 °C) 07/11/25 22:43   Pulse 76 07/11/25 22:43   Resp 19 07/11/25 22:43   SpO2 93 % 07/11/25 22:43     Vitals Value Taken Time   /74 07/12/25 05:07   Temp 98.2 °F (36.8 °C) 07/12/25 05:07   Pulse 78 07/12/25 05:07   Resp 18 07/12/25 05:07   SpO2 92 % 07/12/25 05:07

## 2025-07-12 NOTE — SUBJECTIVE & OBJECTIVE
Past Medical History:   Diagnosis Date    Anticoagulant long-term use     Coronary artery disease     Hypertension     Myelodysplastic syndrome     Peripheral vascular disease, unspecified        Past Surgical History:   Procedure Laterality Date    BONE MARROW BIOPSY Left 4/26/2023    Procedure: Biopsy-bone marrow;  Surgeon: Harry Diamond MD;  Location: [facility] OR;  Service: Oncology;  Laterality: Left;    COLONOSCOPY N/A 01/05/2022    Procedure: COLONOSCOPY Golytely Vaccinated will bring cards;  Surgeon: Dereje Simon MD;  Location: [facility] ENDO;  Service: Endoscopy;  Laterality: N/A;  Do not cancel this order    ESOPHAGOGASTRODUODENOSCOPY N/A 5/8/2025    Procedure: EGD (ESOPHAGOGASTRODUODENOSCOPY);  Surgeon: Duldey Toth MD;  Location: [facility] ENDO;  Service: Endoscopy;  Laterality: N/A;  ref by Gilma Ugalde PA-C,portal-ae    INSERTION OF LEMONS CATHETER Right 9/13/2024    Procedure: INSERTION, CATHETER, CENTRAL VENOUS, LEMONS TRIPLE LUMEN;  Surgeon: Kg Patten MD;  Location: [address];  Service: General;  Laterality: Right;       Review of patient's allergies indicates:  No Known Allergies    No current facility-administered medications on file prior to encounter.     Current Outpatient Medications on File Prior to Encounter   Medication Sig    acyclovir (ZOVIRAX) 800 MG Tab Take 1 tablet (800 mg total) by mouth 2 (two) times daily.    atovaquone (MEPRON) 750 mg/5 mL Susp oral liquid Take 10 mLs (1,500 mg total) by mouth once daily.    budesonide (ENTOCORT EC) 3 mg capsule Take 1 capsule (3 mg total) by mouth 3 (three) times daily.    carvediloL (COREG) 3.125 MG tablet Take 1 tablet (3.125 mg total) by mouth 2 (two) times daily.    cyanocobalamin 1,000 mcg/mL injection Inject 1 mL (1,000 mcg total) into the muscle once a week.    eltrombopag olamine (PROMACTA) 50 MG Tab Take 2 tablets (100 mg total) by mouth once daily.    folic acid (FOLVITE) 1 MG tablet Take 1 tablet (1 mg  total) by mouth once daily.    gabapentin (NEURONTIN) 300 MG capsule Take 2 capsules (600 mg total) by mouth nightly as needed (Restless leg).    guaiFENesin (MUCINEX) 600 mg 12 hr tablet Take 1 tablet (600 mg total) by mouth 2 (two) times daily.    hydrocortisone 1 % cream Apply to affected area 2 times daily    letermovir (PREVYMIS) 480 mg Tab Take 1 tablet (480 mg total) by mouth Daily.    levoFLOXacin (LEVAQUIN) 500 MG tablet Take 1 tablet (500 mg total) by mouth once daily.    loperamide (IMODIUM A-D) 2 mg Tab Take 2 mg by mouth 4 (four) times daily as needed.    LORazepam (ATIVAN) 1 MG tablet Take 1 tablet (1 mg total) by mouth once. for 1 dose    magnesium oxide (MAG-OX) 400 mg (241.3 mg magnesium) tablet Take 2 tablets (800 mg total) by mouth 2 (two) times daily.    ondansetron (ZOFRAN-ODT) 8 MG TbDL Take 1 tablet (8 mg total) by mouth every 8 (eight) hours as needed (nausea).    pantoprazole (PROTONIX) 40 MG tablet Take 1 tablet (40 mg total) by mouth once daily.    posaconazole (NOXAFIL) 100 mg TbEC tablet Take 3 tablets (300 mg total) by mouth once daily.    rOPINIRole (REQUIP) 0.5 MG tablet Take 1 tablet (0.5 mg total) by mouth every evening.    tacrolimus (PROGRAF) 0.5 MG Cap Take 1 capsule (0.5 mg total) by mouth every morning AND 1 capsule (0.5 mg total) every evening.    tamsulosin (FLOMAX) 0.4 mg Cap Take 1 capsule (0.4 mg total) by mouth once daily.    UNABLE TO FIND Take by mouth once daily. medication name: copper    zolpidem (AMBIEN) 5 MG Tab Take 1 tablet (5 mg total) by mouth nightly as needed (insomnia).     Family History       Problem Relation (Age of Onset)    Cancer Father, Brother    Hypertension Mother    No Known Problems Daughter, Daughter, Son          Tobacco Use    Smoking status: Former     Current packs/day: 0.00     Average packs/day: 0.3 packs/day for 50.0 years (12.5 ttl pk-yrs)     Types: Cigarettes     Start date: 3/1/1973     Quit date: 3/1/2023     Years since quitting:  2.3     Passive exposure: Past    Smokeless tobacco: Never   Substance and Sexual Activity    Alcohol use: Not Currently    Drug use: Never    Sexual activity: Not Currently     Partners: Female     Review of Systems   Constitutional:  Positive for activity change, appetite change and fatigue. Negative for chills, diaphoresis, fever and unexpected weight change.   HENT:  Negative for congestion, ear pain, hearing loss, mouth sores, nosebleeds, rhinorrhea, sinus pressure, sore throat and trouble swallowing.    Eyes: Negative.    Respiratory: Negative.     Cardiovascular: Negative.    Gastrointestinal:  Positive for diarrhea, nausea and vomiting. Negative for abdominal pain and blood in stool.   Endocrine: Negative.    Genitourinary:  Positive for frequency.   Musculoskeletal: Negative.  Negative for arthralgias and myalgias.   Skin: Negative.    Neurological:  Positive for dizziness, syncope, weakness, light-headedness and headaches. Negative for tremors, seizures and speech difficulty.   Psychiatric/Behavioral: Negative.       Objective:     Vital Signs (Most Recent):  Temp: 97.4 °F (36.3 °C) (07/11/25 2021)  Pulse: 77 (07/11/25 2021)  Resp: 18 (07/11/25 2021)  BP: (!) 150/76 (07/11/25 2021)  SpO2: 96 % (07/11/25 2021) Vital Signs (24h Range):  Temp:  [97.4 °F (36.3 °C)] 97.4 °F (36.3 °C)  Pulse:  [77] 77  Resp:  [18] 18  SpO2:  [96 %] 96 %  BP: (150)/(76) 150/76        There is no height or weight on file to calculate BMI.     Physical Exam  Constitutional:       General: He is not in acute distress.     Appearance: Normal appearance. He is normal weight. He is not ill-appearing, toxic-appearing or diaphoretic.   HENT:      Head: Normocephalic.   Eyes:      General: No scleral icterus.        Right eye: No discharge.         Left eye: No discharge.      Conjunctiva/sclera: Conjunctivae normal.   Pulmonary:      Effort: Pulmonary effort is normal. No respiratory distress.   Musculoskeletal:      Cervical back:  Normal range of motion. No rigidity.   Skin:     General: Skin is dry.      Coloration: Skin is not jaundiced.      Findings: Bruising (where blood was drawn) present.   Neurological:      General: No focal deficit present.      Mental Status: He is alert and oriented to person, place, and time.      Cranial Nerves: No cranial nerve deficit.   Psychiatric:         Mood and Affect: Mood normal.         Behavior: Behavior normal.         Thought Content: Thought content normal.         Judgment: Judgment normal.                Significant Labs: All pertinent labs within the past 24 hours have been reviewed.  Recent Lab Results         07/11/25  1515        Unit Blood Type Code 5100  [P]       Unit Expiration [id number]  [P]       Albumin 3.2       ALP 63       ALT 16       Anion Gap 6       [place] Slight       AST 26       BILIRUBIN TOTAL 0.8  Comment: For infants and newborns, interpretation of results should be based   on gestational age, weight and in agreement with clinical   observations.    Premature Infant recommended reference ranges:   0-24 hours:  <8.0 mg/dL   24-48 hours: <12.0 mg/dL   3-5 days:    <15.0 mg/dL   6-29 days:   <15.0 mg/dL       BUN 12       Calcium 8.2       Chloride 104       CO2 30       Creatinine 1.7       Crossmatch Interpretation Compatible  [P]       DISPENSE STATUS Selected  [P]       eGFR 43  Comment: Estimated GFR calculated using the CKD-EPI creatinine (2021) equation.       Fragmented Cells Occasional       Glucose 107       Gran # (ANC) 0.8       Group & Rh B POS       Hematocrit 25.6       Hemoglobin 8.6       INDIRECT TYLOR NEG       Lactate Dehydrogenase 277       Lymph % 27.0       Magnesium  2.0       MCH 39.3       MCHC 33.6              Mono % 18.0       MPV 12.4       nRBC 0       Phosphorus Level 3.3       Platelet Estimate Decreased       Platelet Count 34       Poikilocytosis Slight       Potassium 4.0       Product Code E8029W75  [P]       PROTEIN TOTAL 5.2        RBC 2.19       RDW 14.2       Segmented Neutrophil % 55.0       Sodium 140       Specimen Outdate 07/14/2025 23:59       Unit ABO/Rh O POS  [P]       UNIT NUMBER [id number]  [P]       Uric Acid 5.3       WBC 1.38                [P] - Preliminary Result               Significant Imaging: I have reviewed all pertinent imaging results/findings within the past 24 hours.none

## 2025-07-12 NOTE — ASSESSMENT & PLAN NOTE
Suspected due to abnormal appearance of CXR on admission (I personally reviewed and interpreted). Administer empiric IV ceftriaxone in addition to antimicrobial prophylaxis. Check urine Legionella and pneumococcal antigens. Obtain CT chest.

## 2025-07-13 VITALS
DIASTOLIC BLOOD PRESSURE: 60 MMHG | OXYGEN SATURATION: 92 % | SYSTOLIC BLOOD PRESSURE: 120 MMHG | HEIGHT: 68 IN | HEART RATE: 78 BPM | WEIGHT: 149.5 LBS | RESPIRATION RATE: 16 BRPM | BODY MASS INDEX: 22.66 KG/M2 | TEMPERATURE: 98 F

## 2025-07-13 LAB
ABSOLUTE NEUTROPHIL MANUAL (OHS): 2.6 K/UL
ALBUMIN SERPL BCP-MCNC: 2.8 G/DL (ref 3.5–5.2)
ALP SERPL-CCNC: 59 UNIT/L (ref 40–150)
ALT SERPL W/O P-5'-P-CCNC: 15 UNIT/L (ref 10–44)
ANION GAP (OHS): 5 MMOL/L (ref 8–16)
AST SERPL-CCNC: 27 UNIT/L (ref 11–45)
BILIRUB SERPL-MCNC: 0.5 MG/DL (ref 0.1–1)
BUN SERPL-MCNC: 13 MG/DL (ref 8–23)
CALCIUM SERPL-MCNC: 7.8 MG/DL (ref 8.7–10.5)
CHLORIDE SERPL-SCNC: 111 MMOL/L (ref 95–110)
CO2 SERPL-SCNC: 25 MMOL/L (ref 23–29)
CREAT SERPL-MCNC: 1.5 MG/DL (ref 0.5–1.4)
ERYTHROCYTE [DISTWIDTH] IN BLOOD BY AUTOMATED COUNT: 13.8 % (ref 11.5–14.5)
GFR SERPLBLD CREATININE-BSD FMLA CKD-EPI: 50 ML/MIN/1.73/M2
GLUCOSE SERPL-MCNC: 123 MG/DL (ref 70–110)
HCT VFR BLD AUTO: 22.8 % (ref 40–54)
HGB BLD-MCNC: 7.8 GM/DL (ref 14–18)
HOLD SPECIMEN: NORMAL
LYMPHOCYTES NFR BLD MANUAL: 8 % (ref 18–48)
MAGNESIUM SERPL-MCNC: 1.8 MG/DL (ref 1.6–2.6)
MCH RBC QN AUTO: 39 PG (ref 27–31)
MCHC RBC AUTO-ENTMCNC: 34.2 G/DL (ref 32–36)
MCV RBC AUTO: 114 FL (ref 82–98)
METAMYELOCYTES NFR BLD MANUAL: 2 %
MONOCYTES NFR BLD MANUAL: 11 % (ref 4–15)
MYELOCYTES NFR BLD MANUAL: 1 %
NEUTROPHILS NFR BLD MANUAL: 76 % (ref 38–73)
NEUTS BAND NFR BLD MANUAL: 2 %
NUCLEATED RBC (/100WBC) (OHS): 0 /100 WBC
PHOSPHATE SERPL-MCNC: 2.7 MG/DL (ref 2.7–4.5)
PLATELET # BLD AUTO: 30 K/UL (ref 150–450)
PLATELET BLD QL SMEAR: ABNORMAL
PMV BLD AUTO: 11.9 FL (ref 9.2–12.9)
POTASSIUM SERPL-SCNC: 4.4 MMOL/L (ref 3.5–5.1)
PROT SERPL-MCNC: 4.6 GM/DL (ref 6–8.4)
RBC # BLD AUTO: 2 M/UL (ref 4.6–6.2)
SODIUM SERPL-SCNC: 141 MMOL/L (ref 136–145)
WBC # BLD AUTO: 3.36 K/UL (ref 3.9–12.7)

## 2025-07-13 PROCEDURE — 36415 COLL VENOUS BLD VENIPUNCTURE: CPT | Performed by: INTERNAL MEDICINE

## 2025-07-13 PROCEDURE — 85007 BL SMEAR W/DIFF WBC COUNT: CPT | Performed by: INTERNAL MEDICINE

## 2025-07-13 PROCEDURE — 80053 COMPREHEN METABOLIC PANEL: CPT | Performed by: INTERNAL MEDICINE

## 2025-07-13 PROCEDURE — 84100 ASSAY OF PHOSPHORUS: CPT | Performed by: INTERNAL MEDICINE

## 2025-07-13 PROCEDURE — 25000003 PHARM REV CODE 250: Performed by: STUDENT IN AN ORGANIZED HEALTH CARE EDUCATION/TRAINING PROGRAM

## 2025-07-13 PROCEDURE — 83735 ASSAY OF MAGNESIUM: CPT | Performed by: INTERNAL MEDICINE

## 2025-07-13 PROCEDURE — 25000003 PHARM REV CODE 250: Performed by: INTERNAL MEDICINE

## 2025-07-13 PROCEDURE — 99239 HOSP IP/OBS DSCHRG MGMT >30: CPT | Mod: GC,,, | Performed by: INTERNAL MEDICINE

## 2025-07-13 PROCEDURE — 63600175 PHARM REV CODE 636 W HCPCS: Performed by: INTERNAL MEDICINE

## 2025-07-13 RX ORDER — BUDESONIDE 3 MG/1
3 CAPSULE, COATED PELLETS ORAL 3 TIMES DAILY
Qty: 90 CAPSULE | Refills: 2 | Status: SHIPPED | OUTPATIENT
Start: 2025-07-13 | End: 2026-07-13

## 2025-07-13 RX ADMIN — ELTROMBOPAG OLAMINE 100 MG: 50 TABLET, FILM COATED ORAL at 08:07

## 2025-07-13 RX ADMIN — TACROLIMUS 0.5 MG: 0.5 CAPSULE ORAL at 08:07

## 2025-07-13 RX ADMIN — LEVOFLOXACIN 250 MG: 250 TABLET, FILM COATED ORAL at 08:07

## 2025-07-13 RX ADMIN — FOLIC ACID 1 MG: 1 TABLET ORAL at 08:07

## 2025-07-13 RX ADMIN — CARVEDILOL 3.12 MG: 3.12 TABLET, FILM COATED ORAL at 08:07

## 2025-07-13 RX ADMIN — ACYCLOVIR 800 MG: 800 TABLET ORAL at 08:07

## 2025-07-13 RX ADMIN — ATOVAQUONE ORAL SUSPENSION 1500 MG: 750 SUSPENSION ORAL at 08:07

## 2025-07-13 RX ADMIN — TAMSULOSIN HYDROCHLORIDE 0.4 MG: 0.4 CAPSULE ORAL at 08:07

## 2025-07-13 RX ADMIN — CEFTRIAXONE 1 G: 1 INJECTION, POWDER, FOR SOLUTION INTRAMUSCULAR; INTRAVENOUS at 11:07

## 2025-07-13 RX ADMIN — SODIUM CHLORIDE: 0.9 INJECTION, SOLUTION INTRAVENOUS at 01:07

## 2025-07-13 RX ADMIN — POSACONAZOLE 300 MG: 100 TABLET, DELAYED RELEASE ORAL at 08:07

## 2025-07-13 RX ADMIN — PANTOPRAZOLE SODIUM 40 MG: 40 TABLET, DELAYED RELEASE ORAL at 08:07

## 2025-07-13 RX ADMIN — GUAIFENESIN 600 MG: 600 TABLET, EXTENDED RELEASE ORAL at 08:07

## 2025-07-13 RX ADMIN — BUDESONIDE 3 MG: 3 CAPSULE, COATED PELLETS ORAL at 08:07

## 2025-07-13 NOTE — PLAN OF CARE
Pt discharged to home per MD order. Discharge instructions and prescriptions given and explained to pt. Discharge papers printed in Sinhala. Pt d/c from fluids. PIV d/c.  Pt ambulating in room with steady gait; tolerating regular diet; voiding without difficulty. All VSS; O2 sats WNL. Pt left floor by wheelchair via accompanied by wife from the floor.

## 2025-07-13 NOTE — DISCHARGE SUMMARY
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 He has not been doing great the past three weeks. He finished his steroids and two days later starting have dizzy spells, pressure issues, fatigue, productive cough, congestion, one episode of vomiting, and diarrhea. Yesterday we swabbed him in clinic and he tested positive for rhinovirus/enterovirus. The diarrhea has not been every day and he started taking imodium when it does occur. His wife reports yesterday that he fell while in the bathroom into his bathtub. Patient states he did not his head but did "black out for a few seconds". He has some deconditioning but overall stable. Denies diarrhea and rash.    * No surgery found *     Hospital Course: 07/12/2025 Feeling okay overall. Reports loose stools but no diarrhea. Denies dyspnea. No nausea. CT chest ordered due to abnormal CXR. IV ceftriaxone given for possible superimposed bacterial pneumonia. IV fluids for ADDISON.   07/13/2025 Patient doing very well and remains afebrile. Does note some slight dyspnea however remains on RA. His CT revealed a left sided pleural effusion and pulmonology consulted however will plan on interval imaging as an outpatient. He was subsequently discharged for OP follow up with pulm and BMT as scheduled. Plan discussed with patient and/or family. Patient and/or family are in agreement with plan, verbalized " understanding, and all questions were answered.       Vitals:    07/13/25 0359 07/13/25 0842 07/13/25 0844 07/13/25 1121   BP: (!) 146/78 126/75 126/75 120/60   BP Location: Right arm Right arm     Patient Position: Lying Lying  Lying   Pulse: 78 81 81 78   Resp: 18 18  16   Temp: 97.7 °F (36.5 °C) 98.2 °F (36.8 °C)  98.4 °F (36.9 °C)   TempSrc: Oral Oral  Oral   SpO2: (!) 94% (!) 93%  (!) 92%   Weight:       Height:            Goals of Care Treatment Preferences:  Code Status: Full Code    Health care agent: Yennifer Thomas  [facility] agent number: [phone]          What is most important right now is to focus on remaining as independent as possible.  Accordingly, we have decided that the best plan to meet the patient's goals includes continuing with treatment.      Consults (From admission, onward)          Status Ordering Provider     Inpatient consult to Pulmonology  Once        Provider:  (Not yet assigned)    Completed JOSÉ GRIMALDO            Significant Diagnostic Studies: Labs: CMP   Recent Labs   Lab 07/11/25  1515 07/12/25  0233 07/13/25  0541    139 141   K 4.0 4.0 4.4    106 111*   CO2 30* 27 25    106 123*   BUN 12 12 13   CREATININE 1.7* 1.6* 1.5*   CALCIUM 8.2* 8.4* 7.8*   PROT 5.2* 4.4* 4.6*   ALBUMIN 3.2* 2.8* 2.8*   BILITOT 0.8 0.6 0.5   ALKPHOS 63 58 59   AST 26 24 27   ALT 16 13 15   ANIONGAP 6* 6* 5*    and CBC   Recent Labs   Lab 07/11/25  1515 07/12/25  0233 07/13/25  0541   WBC 1.38* 0.91* 3.36*   HGB 8.6* 7.4* 7.8*   HCT 25.6* 21.5* 22.8*   PLT 34* 26* 30*       Pending Diagnostic Studies:       Procedure Component Value Units Date/Time    Legionella antigen, urine [[id number]] Collected: 07/12/25 2128    Order Status: Sent Lab Status: In process Updated: 07/12/25 2152    Specimen: Urine     Streptococcus pneumoniae Antigens, Urine [[id number]] Collected: 07/12/25 2128    Order Status: Sent Lab Status: In process Updated: 07/12/25 2152    Specimen:  Urine, Unspecified           Final Active Diagnoses:    Diagnosis Date Noted POA    PRINCIPAL PROBLEM:  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] 08/27/2024 Not Applicable    Pneumonia of left lower lobe due to infectious organism [J18.9] 07/12/2025 Yes    Postviral fatigue syndrome [G93.31] 07/11/2025 Yes    Pancytopenia [D61.818] 12/26/2024 Yes    Acute kidney injury [N17.9] 12/26/2024 Yes    Urinary frequency [R35.0] 09/27/2024 Yes    Myelodysplastic syndrome [D46.9] 05/12/2023 Yes    Hypertension, essential [I10] 11/29/2021 Yes    Coronary artery disease involving native coronary artery of native heart without angina pectoris [I25.10] 11/22/2021 Yes    Physical debility [R53.81] 11/08/2021 Yes      Problems Resolved During this Admission:      Discharged Condition: good    Disposition: Home or Self Care    Follow Up:    Patient Instructions:      CT Chest Without Contrast   Standing Status: Future Standing Exp. Date: 07/13/26     Order Specific Question Answer Comments   May the Radiologist modify the order per protocol to meet the clinical needs of the patient? Yes    Is this a  Screening? No      Ambulatory referral/consult to Pulmonology   Standing Status: Future   Referral Priority: Routine Referral Type: Consultation   Referral Reason: Specialty Services Required   Requested Specialty: Pulmonary Disease   Number of Visits Requested: 1     Medications:  Reconciled Home Medications:      Medication List        CONTINUE taking these medications      acyclovir 800 MG Tab  Commonly known as: ZOVIRAX  [place] ivette tableta (800 mg en total) por vía oral 2 veces al día.  (Take 1 tablet (800 mg total) by mouth 2 (two) times daily.)     atovaquone 750 mg/5 mL Susp oral liquid  Commonly known as: MEPRON  Take 10 mLs (1,500 mg total) by mouth once daily.     budesonide 3 mg capsule  Commonly known as: ENTOCORT EC  Take 1 capsule (3 mg total) by mouth 3 (three) times daily.     carvediloL 3.125 MG  tablet  Commonly known as: COREG  Take 1 tablet (3.125 mg total) by mouth 2 (two) times daily.     cyanocobalamin 1,000 mcg/mL injection  Inject 1 mL (1,000 mcg total) into the muscle once a week.     eltrombopag olamine 50 MG Tab  Commonly known as: PROMACTA  Take 2 tablets (100 mg total) by mouth once daily.     folic acid 1 MG tablet  Commonly known as: FOLVITE  Take 1 tablet (1 mg total) by mouth once daily.     gabapentin 300 MG capsule  Commonly known as: NEURONTIN  Take 2 capsules (600 mg total) by mouth nightly as needed (Restless leg).     guaiFENesin 600 mg 12 hr tablet  Commonly known as: MUCINEX  Take 1 tablet (600 mg total) by mouth 2 (two) times daily.     hydrocortisone 1 % cream  Apply to affected area 2 times daily     letermovir 480 mg Tab  Commonly known as: PREVYMIS  [place] 1 tableta (480 mg en total) por vía oral al día.  (Take 1 tablet (480 mg total) by mouth Daily.)     levoFLOXacin 500 MG tablet  Commonly known as: LEVAQUIN  [place] ivette tableta (500 mg en total) por vía oral diariamente.  (Take 1 tablet (500 mg total) by mouth once daily.)     loperamide 2 mg Tab  Commonly known as: IMODIUM A-D  Take 2 mg by mouth 4 (four) times daily as needed.     LORazepam 1 MG tablet  Commonly known as: ATIVAN  Take 1 tablet (1 mg total) by mouth once. for 1 dose     magnesium oxide 400 mg (241.3 mg magnesium) tablet  Commonly known as: MAG-OX  Take 2 tablets (800 mg total) by mouth 2 (two) times daily.     ondansetron 8 MG Tbdl  Commonly known as: ZOFRAN-ODT  Take 1 tablet (8 mg total) by mouth every 8 (eight) hours as needed (nausea).     pantoprazole 40 MG tablet  Commonly known as: PROTONIX  Take 1 tablet (40 mg total) by mouth once daily.     posaconazole 100 mg Tbec tablet  Commonly known as: NOXAFIL  [place] 3 tabletas por via oral ivette vez al markus  (Take 3 tablets (300 mg total) by mouth once daily.)     rOPINIRole 0.5 MG tablet  Commonly known as: REQUIP  Take 1 tablet (0.5 mg total) by mouth every  evening.     tacrolimus 0.5 MG Cap  Commonly known as: PROGRAF  Take 1 capsule (0.5 mg total) by mouth every morning AND 1 capsule (0.5 mg total) every evening.     tamsulosin 0.4 mg Cap  Commonly known as: FLOMAX  Take 1 capsule (0.4 mg total) by mouth once daily.     UNABLE TO FIND  Take by mouth once daily. medication name: copper     zolpidem 5 MG Tab  Commonly known as: AMBIEN  Take 1 tablet (5 mg total) by mouth nightly as needed (insomnia).              Bj Freeman MD  Bone Marrow Transplant  [facility] - Oncology ([facility])

## 2025-07-13 NOTE — CONSULTS
[facility] Consult Note    Primary Attending Physician: Dr. Salguero  Consultant Attending: Dr. Dunaway  Consultant Fellow: Sara Espinosa      Reason for Consult:     Pleural effusion    Subjective:      History of Present Illness:  Guillaume Salinas is a 69 y.o.  male who  has a past medical history of Anticoagulant long-term use, Coronary artery disease, Hypertension, Myelodysplastic syndrome, and Peripheral vascular disease, unspecified.. The patient presented to the [facility] ED on 7/11/2025 with a primary complaint of syncopal episode on 7/8. He has been having some dizzy spells for the last few weeks. He's had issues with diarrhea, fatigue, productive cough, vomiting and congestion.   He tested positive for rhino/enterovirus on 7/10.    Post-transplant he is on cyclophosphamide, Tacrolimus, MMF    On a CT chest obtained on 7/12, he was found to have a small Left-sided pleural effusion and pulmonology is consulted to assess need for thoracentesis.    Of note, patient is thrombocytopenic to 32. He is also neutropenic in the setting of MDS.      Past Medical History:  Past Medical History:   Diagnosis Date    Anticoagulant long-term use     Coronary artery disease     Hypertension     Myelodysplastic syndrome     Peripheral vascular disease, unspecified        Past Surgical History:  Past Surgical History:   Procedure Laterality Date    BONE MARROW BIOPSY Left 4/26/2023    Procedure: Biopsy-bone marrow;  Surgeon: Harry Diamond MD;  Location: [facility];  Service: Oncology;  Laterality: Left;    COLONOSCOPY N/A 01/05/2022    Procedure: COLONOSCOPY Golytely Vaccinated will bring cards;  Surgeon: Dereje Simon MD;  Location: [facility];  Service: Endoscopy;  Laterality: N/A;  Do not cancel this order    ESOPHAGOGASTRODUODENOSCOPY N/A 5/8/2025    Procedure: EGD (ESOPHAGOGASTRODUODENOSCOPY);  Surgeon: Dudley Toth MD;  Location: [facility];  Service: Endoscopy;   Laterality: N/A;  ref by Gilma Ugalde, PADeliaC,portal-ae    INSERTION OF LEMONS CATHETER Right 9/13/2024    Procedure: INSERTION, CATHETER, CENTRAL VENOUS, LEMONS TRIPLE LUMEN;  Surgeon: gK Patten MD;  Location: [facility] OR [address] [place] [address];  Service: General;  Laterality: Right;       Allergies:  Review of patient's allergies indicates:  No Known Allergies    Medications:   In-Hospital Scheduled Medications:   acyclovir  800 mg Oral BID    atovaquone  1,500 mg Oral Daily    budesonide  3 mg Oral TID    carvediloL  3.125 mg Oral BID    cefTRIAXone (Rocephin) IV (PEDS and ADULTS)  1 g Intravenous Q24H    cyanocobalamin  1,000 mcg Intramuscular Weekly    eltrombopag olamine  100 mg Oral Daily    folic acid  1 mg Oral Daily    guaiFENesin  600 mg Oral BID    levoFLOXacin  250 mg Oral Daily    pantoprazole  40 mg Oral Daily    posaconazole  300 mg Oral Daily    rOPINIRole  0.5 mg Oral QHS    tacrolimus  0.5 mg Oral BID    tamsulosin  0.4 mg Oral Daily      In-Hospital PRN Medications:    Current Facility-Administered Medications:     acetaminophen, 650 mg, Oral, Q4H PRN    aluminum-magnesium hydroxide-simethicone, 30 mL, Oral, QID PRN    dextrose 50%, 12.5 g, Intravenous, PRN    dextrose 50%, 25 g, Intravenous, PRN    gabapentin, 600 mg, Oral, Nightly PRN    glucagon (human recombinant), 1 mg, Intramuscular, PRN    glucose, 16 g, Oral, PRN    glucose, 24 g, Oral, PRN    magnesium oxide, 800 mg, Oral, PRN    magnesium oxide, 800 mg, Oral, PRN    naloxone, 0.02 mg, Intravenous, PRN    ondansetron, 4 mg, Intravenous, Q8H PRN    potassium bicarbonate, 35 mEq, Oral, PRN    potassium bicarbonate, 50 mEq, Oral, PRN    potassium bicarbonate, 60 mEq, Oral, PRN    potassium, sodium phosphates, 2 packet, Oral, PRN    potassium, sodium phosphates, 2 packet, Oral, PRN    potassium, sodium phosphates, 2 packet, Oral, PRN    simethicone, 1 tablet, Oral, QID PRN    sodium chloride 0.9%, 10 mL, Intravenous, Q12H PRN    zolpidem, 5  mg, Oral, Nightly PRN   In-Hospital IV Infusion Medications:   0.9% NaCl   Intravenous Continuous 75 mL/hr at 07/13/25 0100 New Bag at 07/13/25 0100      Home Medications:  Prior to Admission medications    Medication Sig Start Date End Date Taking? Authorizing Provider   acyclovir (ZOVIRAX) 800 MG Tab Take 1 tablet (800 mg total) by mouth 2 (two) times daily. 12/23/24 12/23/25  Mohit Hickey MD   atovaquone (MEPRON) 750 mg/5 mL Susp oral liquid Take 10 mLs (1,500 mg total) by mouth once daily. 6/2/25   Gilma Ugalde PA-C   budesonide (ENTOCORT EC) 3 mg capsule Take 1 capsule (3 mg total) by mouth 3 (three) times daily. 4/30/25 4/30/26  Gilma Ugalde PA-C   carvediloL (COREG) 3.125 MG tablet Take 1 tablet (3.125 mg total) by mouth 2 (two) times daily. 6/10/25 6/10/26  Gilma Ugalde PA-C   cyanocobalamin 1,000 mcg/mL injection Inject 1 mL (1,000 mcg total) into the muscle once a week. 2/7/25   Mohit Hickey MD   eltrombopag olamine (PROMACTA) 50 MG Tab Take 2 tablets (100 mg total) by mouth once daily. 7/7/25   Mohit Hickey MD   folic acid (FOLVITE) 1 MG tablet Take 1 tablet (1 mg total) by mouth once daily. 2/23/25 2/23/26  Sondra Jimenez PA-C   gabapentin (NEURONTIN) 300 MG capsule Take 2 capsules (600 mg total) by mouth nightly as needed (Restless leg). 6/30/25   Gilma Ugalde PA-C   guaiFENesin (MUCINEX) 600 mg 12 hr tablet Take 1 tablet (600 mg total) by mouth 2 (two) times daily. 7/9/25 7/9/26  Gilma Ugalde PA-C   hydrocortisone 1 % cream Apply to affected area 2 times daily 3/14/25 3/14/26  Gilma Ugalde PA-C   letermovir (PREVYMIS) 480 mg Tab Take 1 tablet (480 mg total) by mouth Daily. 12/26/24   Gilma Ugalde PA-C   levoFLOXacin (LEVAQUIN) 500 MG tablet Take 1 tablet (500 mg total) by mouth once daily. 1/6/25   Judy Anthony, NP   loperamide (IMODIUM A-D) 2 mg Tab Take 2 mg by mouth 4 (four) times daily as needed.    Provider,  Historical   LORazepam (ATIVAN) 1 MG tablet Take 1 tablet (1 mg total) by mouth once. for 1 dose 25  Gilma Ugalde PA-C   magnesium oxide (MAG-OX) 400 mg (241.3 mg magnesium) tablet Take 2 tablets (800 mg total) by mouth 2 (two) times daily. 10/28/24   Sondra Jimenez PA-C   ondansetron (ZOFRAN-ODT) 8 MG TbDL Take 1 tablet (8 mg total) by mouth every 8 (eight) hours as needed (nausea). 25   Jose M Kruse MD   pantoprazole (PROTONIX) 40 MG tablet Take 1 tablet (40 mg total) by mouth once daily. 25  Elvira Maria MD   posaconazole (NOXAFIL) 100 mg TbEC tablet Take 3 tablets (300 mg total) by mouth once daily. 3/11/25   Gilma Ugalde PA-C   rOPINIRole (REQUIP) 0.5 MG tablet Take 1 tablet (0.5 mg total) by mouth every evening. 25  Gilma Ugalde PA-C   tacrolimus (PROGRAF) 0.5 MG Cap Take 1 capsule (0.5 mg total) by mouth every morning AND 1 capsule (0.5 mg total) every evening. 25   Gilma Ugalde PA-C   tamsulosin (FLOMAX) 0.4 mg Cap Take 1 capsule (0.4 mg total) by mouth once daily. 25  Cristhian Donovan MD   UNABLE TO FIND Take by mouth once daily. medication name: copper    Provider, Historical   zolpidem (AMBIEN) 5 MG Tab Take 1 tablet (5 mg total) by mouth nightly as needed (insomnia). 7/3/25   Gilma Ugalde PA-C       Family History:  Family History   Problem Relation Name Age of Onset    Hypertension Mother      Cancer Father      Cancer Brother 9     No Known Problems Daughter      No Known Problems Daughter      No Known Problems Son         Social History:  Social History[1]    Review of Systems:  Pertinent items are noted in HPI. All other systems are reviewed and are negative.     Objective:   Last 24 Hour Vital Signs:  BP  Min: 126/75  Max: 157/84  Temp  Av.2 °F (36.8 °C)  Min: 97.7 °F (36.5 °C)  Max: 98.7 °F (37.1 °C)  Pulse  Av.9  Min: 74  Max: 81  Resp  Av  Min: 17  Max: 20  SpO2  Av.3  "%  Min: 91 %  Max: 95 %  Height  Av' 8" (172.7 cm)  Min: 5' 8" (172.7 cm)  Max: 5' 8" (172.7 cm)  Weight  Av.8 kg (149 lb 7.6 oz)  Min: 67.8 kg (149 lb 7.6 oz)  Max: 67.8 kg (149 lb 7.6 oz)  I/O last 3 completed shifts:  In: -   Out: 625 [Urine:625]    Physical Examination:  Gen: Alert, NAD   HEENT: NC/AT, PERRLA, anicteric sclerae, TMs clear, nasal mucosa pink, OP clear   Neck: Supple, no LAD, no thyromegaly   CV: RRR, no m/r/g, pulses 2+ symmetric   Resp: CTA bilat, no wheezes/r/r   Abd: Soft, NT/ND, BS present, no HSM   MSK: FROM, no deformities, no tenderness   Neuro: A&Ox3, CN II-XII intact, 5/5 strength, reflexes 2+, sensation intact Skin: Warm, dry, no r/l       Laboratory:  Trended Lab Data:  Recent Labs     25  1515 25  0233 25  0541   WBC 1.38* 0.91* 3.36*   HGB 8.6* 7.4* 7.8*   HCT 25.6* 21.5* 22.8*   PLT 34* 26* 30*    139 141   K 4.0 4.0 4.4    106 111*   CO2 30* 27 25   BUN 12 12 13   CREATININE 1.7* 1.6* 1.5*    106 123*   BILITOT 0.8 0.6 0.5   AST 26 24 27   ALT 16 13 15   ALKPHOS 63 58 59   CALCIUM 8.2* 8.4* 7.8*   ALBUMIN 3.2* 2.8* 2.8*   PROT 5.2* 4.4* 4.6*   MG 2.0 1.9 1.8   PHOS 3.3 2.7 2.7       Cardiac: No results for input(s): "TROPONINI", "CKTOTAL", "CKMB", "BNP" in the last 168 hours.    Urinalysis:   Lab Results   Component Value Date    LABURIN No significant growth 2025    COLORU Colorless (A) 2025    SPECGRAV <1.005 (A) 2025    NITRITE Negative 2025    KETONESU Negative 2025    UROBILINOGEN Negative 2025       Microbiology:  Microbiology Results (last 7 days)       ** No results found for the last 168 hours. **            Radiology:  Reviewed CT chest with small, simple appearing L sided pleural effusion.      I have personally reviewed the above labs and imaging.    Current Medications:     Infusions:   0.9% NaCl   Intravenous Continuous 75 mL/hr at 25 0100 New Bag at 25 0100        " Scheduled:   acyclovir  800 mg Oral BID    atovaquone  1,500 mg Oral Daily    budesonide  3 mg Oral TID    carvediloL  3.125 mg Oral BID    cefTRIAXone (Rocephin) IV (PEDS and ADULTS)  1 g Intravenous Q24H    cyanocobalamin  1,000 mcg Intramuscular Weekly    eltrombopag olamine  100 mg Oral Daily    folic acid  1 mg Oral Daily    guaiFENesin  600 mg Oral BID    levoFLOXacin  250 mg Oral Daily    pantoprazole  40 mg Oral Daily    posaconazole  300 mg Oral Daily    rOPINIRole  0.5 mg Oral QHS    tacrolimus  0.5 mg Oral BID    tamsulosin  0.4 mg Oral Daily        PRN:    Current Facility-Administered Medications:     acetaminophen, 650 mg, Oral, Q4H PRN    aluminum-magnesium hydroxide-simethicone, 30 mL, Oral, QID PRN    dextrose 50%, 12.5 g, Intravenous, PRN    dextrose 50%, 25 g, Intravenous, PRN    gabapentin, 600 mg, Oral, Nightly PRN    glucagon (human recombinant), 1 mg, Intramuscular, PRN    glucose, 16 g, Oral, PRN    glucose, 24 g, Oral, PRN    magnesium oxide, 800 mg, Oral, PRN    magnesium oxide, 800 mg, Oral, PRN    naloxone, 0.02 mg, Intravenous, PRN    ondansetron, 4 mg, Intravenous, Q8H PRN    potassium bicarbonate, 35 mEq, Oral, PRN    potassium bicarbonate, 50 mEq, Oral, PRN    potassium bicarbonate, 60 mEq, Oral, PRN    potassium, sodium phosphates, 2 packet, Oral, PRN    potassium, sodium phosphates, 2 packet, Oral, PRN    potassium, sodium phosphates, 2 packet, Oral, PRN    simethicone, 1 tablet, Oral, QID PRN    sodium chloride 0.9%, 10 mL, Intravenous, Q12H PRN    zolpidem, 5 mg, Oral, Nightly PRN     Assessment:     Guillaume Salinas is a 69 y.o. 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    Pancytopenia 12/26/2024    RLS (restless legs syndrome) 12/26/2024    Decreased strength, endurance, and mobility 12/20/2024    Palliative care by specialist 10/23/2024    Electrolyte disorder 10/01/2024    Hypomagnesemia 10/01/2024    Moderate protein-calorie malnutrition 09/30/2024    Dry mouth 09/29/2024    Nausea and vomiting 09/27/2024    Urinary frequency 09/27/2024    Hemorrhoids 09/26/2024    Headache 09/25/2024    Chemotherapy induced diarrhea 09/24/2024    Neutropenic fever 09/20/2024    Neuropathy 09/17/2024    History of Clostridioides difficile infection 09/17/2024    Insomnia 09/17/2024    Abnormal PFT 09/05/2024    S/P allogeneic bone marrow transplant 08/27/2024    Emphysema lung 08/10/2024    Decreased activity tolerance 07/14/2024    Chemotherapy-induced neutropenia 04/05/2024    B12 deficiency 02/02/2024    Chronic kidney disease, stage 3a 01/05/2024    Thrombocytopenia 06/29/2023    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    Myelodysplastic syndrome 05/12/2023    Macrocytic anemia 04/26/2023    Atherosclerosis of native artery of both lower extremities with intermittent claudication 04/18/2023    Abnormal sensation of leg 06/13/2022    Aortic atherosclerosis 03/03/2022    Colon polyps 01/05/2022    Hypertension, essential 11/29/2021    Iliac artery stenosis, right 11/22/2021    Coronary artery disease involving native coronary artery of native heart without angina pectoris 11/22/2021    Personal history of nicotine dependence 11/22/2021    Physical debility 11/08/2021    Hyperlipidemia 10/20/2021        Plan:     #Left sided Pleural effusion  #Rhinovirus infection  #Hx of BMT for MDS      Recommendations:  - While a few features in history and imaging suggest a possible exudative process (unilateral, left-sided, recent rhinovirus infection), patient has been afebrile,  requiring no oxygen and effusion is relatively small and he is moderately thrombocytopenic.   - Weighing the risks/benefits, we think that the risk of complications at this time outweigh the potential diagnostic/therapeutic benefits of performing a thora today.   - Will plan on repeating imaging in 4 weeks and see patient in clinic after that.   - Will arrange for follow up as outpatient.     Thank you for allowing us to participate in the care of this patient. Please contact me if you have any questions regarding this consult.    Sara Espinosa MD  Pulmonary & Critical Care Medicine Fellow         [1]   Social History  Tobacco Use    Smoking status: Former     Current packs/day: 0.00     Average packs/day: 0.3 packs/day for 50.0 years (12.5 ttl pk-yrs)     Types: Cigarettes     Start date: 3/1/1973     Quit date: 3/1/2023     Years since quittin.3     Passive exposure: Past    Smokeless tobacco: Never   Substance Use Topics    Alcohol use: Not Currently    Drug use: Never

## 2025-07-13 NOTE — NURSING
Assumed care of pt. [id number]  Plan of care reviewed with pt, understanding verbalized.  -Day +297 Allo SCT  -Continuous NS infusing @ 75mls  -Urine specimens sent   -Nightly Ambien given       Vitals Value Taken Time   /84 07/12/25 19:45   Temp 98.3 °F (36.8 °C) 07/12/25 19:45   Pulse 74 07/12/25 19:45   Resp 18 07/12/25 19:45   SpO2 93 % 07/12/25 19:45     Vitals Value Taken Time   /79 07/12/25 22:53   Temp 98.1 °F (36.7 °C) 07/12/25 22:53   Pulse 80 07/12/25 22:53   Resp 20 07/12/25 22:53   SpO2 91 % 07/12/25 22:53     Vitals Value Taken Time   /78 07/13/25 03:59   Temp 97.7 °F (36.5 °C) 07/13/25 03:59   Pulse 78 07/13/25 03:59   Resp 18 07/13/25 03:59   SpO2 94 % 07/13/25 03:59

## 2025-07-14 DIAGNOSIS — D84.822 IMMUNODEFICIENCY DUE TO EXTERNAL CAUSE: ICD-10-CM

## 2025-07-14 RX ORDER — ATOVAQUONE 750 MG/5ML
1500 SUSPENSION ORAL DAILY
Qty: 210 ML | Refills: 2 | Status: SHIPPED | OUTPATIENT
Start: 2025-07-14

## 2025-07-15 ENCOUNTER — PATIENT OUTREACH (OUTPATIENT)
Dept: ADMINISTRATIVE | Facility: CLINIC | Age: 70
End: 2025-07-15
Payer: MEDICARE

## 2025-07-15 NOTE — PROGRESS NOTES
C3 nurse spoke with patient Guillaume Salinas . TCC call complete. Patient declined a HOSFU appointment.

## 2025-07-16 ENCOUNTER — LAB VISIT (OUTPATIENT)
Dept: LAB | Facility: HOSPITAL | Age: 70
End: 2025-07-16
Attending: INTERNAL MEDICINE
Payer: MEDICARE

## 2025-07-16 DIAGNOSIS — Z76.82 STEM CELL TRANSPLANT CANDIDATE: ICD-10-CM

## 2025-07-16 DIAGNOSIS — D46.9 MYELODYSPLASTIC SYNDROME: ICD-10-CM

## 2025-07-16 LAB
ABSOLUTE NEUTROPHIL MANUAL (OHS): 1.9 K/UL
ALBUMIN SERPL BCP-MCNC: 3.3 G/DL (ref 3.5–5.2)
ALP SERPL-CCNC: 62 UNIT/L (ref 40–150)
ALT SERPL W/O P-5'-P-CCNC: 17 UNIT/L (ref 10–44)
ANION GAP (OHS): 6 MMOL/L (ref 8–16)
AST SERPL-CCNC: 24 UNIT/L (ref 11–45)
BILIRUB SERPL-MCNC: 0.7 MG/DL (ref 0.1–1)
BUN SERPL-MCNC: 19 MG/DL (ref 8–23)
CALCIUM SERPL-MCNC: 8.5 MG/DL (ref 8.7–10.5)
CHLORIDE SERPL-SCNC: 106 MMOL/L (ref 95–110)
CO2 SERPL-SCNC: 28 MMOL/L (ref 23–29)
CREAT SERPL-MCNC: 1.4 MG/DL (ref 0.5–1.4)
EOSINOPHIL NFR BLD MANUAL: 1 % (ref 0–8)
ERYTHROCYTE [DISTWIDTH] IN BLOOD BY AUTOMATED COUNT: 15.3 % (ref 11.5–14.5)
GFR SERPLBLD CREATININE-BSD FMLA CKD-EPI: 54 ML/MIN/1.73/M2
GLUCOSE SERPL-MCNC: 122 MG/DL (ref 70–110)
HCT VFR BLD AUTO: 23.8 % (ref 40–54)
HGB BLD-MCNC: 7.7 GM/DL (ref 14–18)
INDIRECT COOMBS: NORMAL
LDH SERPL-CCNC: 384 U/L (ref 110–260)
LYMPHOCYTES NFR BLD MANUAL: 8 % (ref 18–48)
MAGNESIUM SERPL-MCNC: 1.6 MG/DL (ref 1.6–2.6)
MCH RBC QN AUTO: 38.1 PG (ref 27–31)
MCHC RBC AUTO-ENTMCNC: 32.4 G/DL (ref 32–36)
MCV RBC AUTO: 118 FL (ref 82–98)
MONOCYTES NFR BLD MANUAL: 8 % (ref 4–15)
NEUTROPHILS NFR BLD MANUAL: 83 % (ref 38–73)
NUCLEATED RBC (/100WBC) (OHS): 1 /100 WBC
PHOSPHATE SERPL-MCNC: 2.8 MG/DL (ref 2.7–4.5)
PLATELET # BLD AUTO: 29 K/UL (ref 150–450)
PLATELET BLD QL SMEAR: ABNORMAL
PMV BLD AUTO: 13.4 FL (ref 9.2–12.9)
POTASSIUM SERPL-SCNC: 3.7 MMOL/L (ref 3.5–5.1)
PROT SERPL-MCNC: 5.2 GM/DL (ref 6–8.4)
RBC # BLD AUTO: 2.02 M/UL (ref 4.6–6.2)
RH BLD: NORMAL
SODIUM SERPL-SCNC: 140 MMOL/L (ref 136–145)
SPECIMEN OUTDATE: NORMAL
URATE SERPL-MCNC: 4.8 MG/DL (ref 3.4–7)
W LEGIONELLA URINARY ANTIGEN: NEGATIVE
WBC # BLD AUTO: 2.29 K/UL (ref 3.9–12.7)

## 2025-07-16 PROCEDURE — 83735 ASSAY OF MAGNESIUM: CPT

## 2025-07-16 PROCEDURE — 84550 ASSAY OF BLOOD/URIC ACID: CPT

## 2025-07-16 PROCEDURE — 80197 ASSAY OF TACROLIMUS: CPT

## 2025-07-16 PROCEDURE — 83615 LACTATE (LD) (LDH) ENZYME: CPT

## 2025-07-16 PROCEDURE — 84100 ASSAY OF PHOSPHORUS: CPT

## 2025-07-16 PROCEDURE — 86850 RBC ANTIBODY SCREEN: CPT | Performed by: INTERNAL MEDICINE

## 2025-07-16 PROCEDURE — 85025 COMPLETE CBC W/AUTO DIFF WBC: CPT

## 2025-07-16 PROCEDURE — 84450 TRANSFERASE (AST) (SGOT): CPT

## 2025-07-16 PROCEDURE — 87799 DETECT AGENT NOS DNA QUANT: CPT

## 2025-07-16 PROCEDURE — 36415 COLL VENOUS BLD VENIPUNCTURE: CPT

## 2025-07-17 ENCOUNTER — PATIENT MESSAGE (OUTPATIENT)
Dept: HEMATOLOGY/ONCOLOGY | Facility: CLINIC | Age: 70
End: 2025-07-17
Payer: MEDICARE

## 2025-07-17 DIAGNOSIS — D46.9 MYELODYSPLASTIC SYNDROME: ICD-10-CM

## 2025-07-17 LAB
CYTOMEGALOVIRUS DNA, QUAL (OHS): NOT DETECTED
EPSTEIN-BARR VIRUS DNA, QUAL (OHS): NORMAL
TACROLIMUS BLD-MCNC: 2.9 NG/ML (ref 5–15)

## 2025-07-17 NOTE — PROGRESS NOTES
BMT Pharmacist Immunosuppression Note:    Reviewed patient's tacrolimus level with Dr. Hickey and it is below goal range.      Current regimen: 0.5 mg daily  Capsule size(s): 0.5 mg    Confirmed that patient has been taking all of his doses. Only held the dose before the lab level was taken.     Plan: Increase regimen to 1 capsules (0.5 mg) in the AM and 1 capsules (0.5 mg) in the evening.        Lab Results   Component Value Date    TACROLIMUS 2.9 (L) 07/16/2025    TACROLIMUS 7.7 07/11/2025    TACROLIMUS 11.8 07/08/2025       Creatinine   Date Value Ref Range Status   07/16/2025 1.4 0.5 - 1.4 mg/dL Final   07/13/2025 1.5 (H) 0.5 - 1.4 mg/dL Final   07/12/2025 1.6 (H) 0.5 - 1.4 mg/dL Final     Total Bilirubin   Date Value Ref Range Status   03/17/2025 0.5 0.1 - 1.0 mg/dL Final     Comment:     For infants and newborns, interpretation of results should be based  on gestational age, weight and in agreement with clinical  observations.    Premature Infant recommended reference ranges:  Up to 24 hours.............<8.0 mg/dL  Up to 48 hours............<12.0 mg/dL  3-5 days..................<15.0 mg/dL  6-29 days.................<15.0 mg/dL     03/10/2025 0.4 0.1 - 1.0 mg/dL Final     Comment:     For infants and newborns, interpretation of results should be based  on gestational age, weight and in agreement with clinical  observations.    Premature Infant recommended reference ranges:  Up to 24 hours.............<8.0 mg/dL  Up to 48 hours............<12.0 mg/dL  3-5 days..................<15.0 mg/dL  6-29 days.................<15.0 mg/dL     03/03/2025 0.5 0.1 - 1.0 mg/dL Final     Comment:     For infants and newborns, interpretation of results should be based  on gestational age, weight and in agreement with clinical  observations.    Premature Infant recommended reference ranges:  Up to 24 hours.............<8.0 mg/dL  Up to 48 hours............<12.0 mg/dL  3-5 days..................<15.0 mg/dL  6-29  days.................<15.0 mg/dL       Bilirubin Total   Date Value Ref Range Status   07/16/2025 0.7 0.1 - 1.0 mg/dL Final     Comment:     For infants and newborns, interpretation of results should be based   on gestational age, weight and in agreement with clinical   observations.    Premature Infant recommended reference ranges:   0-24 hours:  <8.0 mg/dL   24-48 hours: <12.0 mg/dL   3-5 days:    <15.0 mg/dL   6-29 days:   <15.0 mg/dL   07/13/2025 0.5 0.1 - 1.0 mg/dL Final     Comment:     For infants and newborns, interpretation of results should be based   on gestational age, weight and in agreement with clinical   observations.    Premature Infant recommended reference ranges:   0-24 hours:  <8.0 mg/dL   24-48 hours: <12.0 mg/dL   3-5 days:    <15.0 mg/dL   6-29 days:   <15.0 mg/dL   07/12/2025 0.6 0.1 - 1.0 mg/dL Final     Comment:     For infants and newborns, interpretation of results should be based   on gestational age, weight and in agreement with clinical   observations.    Premature Infant recommended reference ranges:   0-24 hours:  <8.0 mg/dL   24-48 hours: <12.0 mg/dL   3-5 days:    <15.0 mg/dL   6-29 days:   <15.0 mg/dL     AST   Date Value Ref Range Status   07/16/2025 24 11 - 45 unit/L Final   07/13/2025 27 11 - 45 unit/L Final   07/12/2025 24 11 - 45 unit/L Final   03/17/2025 31 10 - 40 U/L Final   03/10/2025 23 10 - 40 U/L Final   03/03/2025 26 10 - 40 U/L Final     ALT   Date Value Ref Range Status   07/16/2025 17 10 - 44 unit/L Final   07/13/2025 15 10 - 44 unit/L Final   07/12/2025 13 10 - 44 unit/L Final   03/17/2025 35 10 - 44 U/L Final   03/10/2025 31 10 - 44 U/L Final   03/03/2025 36 10 - 44 U/L Final             Yaredis Emmanuelli Cantor, PharmD  Clinical Pharmacy Specialist, Bone Marrow Transplant/Hematology  Spectra link: [phone]

## 2025-07-18 LAB — W STREPTOCOCCUS PNEUMONIAE ANTIGENS, URINE: NOT DETECTED

## 2025-07-21 DIAGNOSIS — I10 ESSENTIAL HYPERTENSION, MALIGNANT: Primary | ICD-10-CM

## 2025-07-21 DIAGNOSIS — G25.81 RESTLESS LEG SYNDROME: ICD-10-CM

## 2025-07-21 RX ORDER — GABAPENTIN 300 MG/1
600 CAPSULE ORAL NIGHTLY PRN
Qty: 60 CAPSULE | Refills: 2 | Status: SHIPPED | OUTPATIENT
Start: 2025-07-21

## 2025-07-23 ENCOUNTER — LAB VISIT (OUTPATIENT)
Dept: LAB | Facility: HOSPITAL | Age: 70
End: 2025-07-23
Attending: INTERNAL MEDICINE
Payer: MEDICARE

## 2025-07-23 DIAGNOSIS — Z76.82 STEM CELL TRANSPLANT CANDIDATE: ICD-10-CM

## 2025-07-23 DIAGNOSIS — D46.9 MYELODYSPLASTIC SYNDROME: ICD-10-CM

## 2025-07-23 LAB
ABSOLUTE NEUTROPHIL MANUAL (OHS): 1.8 K/UL
ALBUMIN SERPL BCP-MCNC: 3.2 G/DL (ref 3.5–5.2)
ALP SERPL-CCNC: 80 UNIT/L (ref 40–150)
ALT SERPL W/O P-5'-P-CCNC: 22 UNIT/L (ref 10–44)
ANION GAP (OHS): 8 MMOL/L (ref 8–16)
ANISOCYTOSIS BLD QL SMEAR: SLIGHT
AST SERPL-CCNC: 23 UNIT/L (ref 11–45)
BILIRUB SERPL-MCNC: 0.9 MG/DL (ref 0.1–1)
BUN SERPL-MCNC: 35 MG/DL (ref 8–23)
CALCIUM SERPL-MCNC: 8.7 MG/DL (ref 8.7–10.5)
CHLORIDE SERPL-SCNC: 110 MMOL/L (ref 95–110)
CO2 SERPL-SCNC: 25 MMOL/L (ref 23–29)
CREAT SERPL-MCNC: 1.4 MG/DL (ref 0.5–1.4)
DACRYOCYTES BLD QL SMEAR: ABNORMAL
ERYTHROCYTE [DISTWIDTH] IN BLOOD BY AUTOMATED COUNT: 17.9 % (ref 11.5–14.5)
GFR SERPLBLD CREATININE-BSD FMLA CKD-EPI: 54 ML/MIN/1.73/M2
GLUCOSE SERPL-MCNC: 134 MG/DL (ref 70–110)
HCT VFR BLD AUTO: 24.5 % (ref 40–54)
HGB BLD-MCNC: 7.8 GM/DL (ref 14–18)
INDIRECT COOMBS: NORMAL
LDH SERPL-CCNC: 409 U/L (ref 110–260)
LYMPHOCYTES NFR BLD MANUAL: 15 % (ref 18–48)
MAGNESIUM SERPL-MCNC: 1.6 MG/DL (ref 1.6–2.6)
MCH RBC QN AUTO: 38.8 PG (ref 27–31)
MCHC RBC AUTO-ENTMCNC: 31.8 G/DL (ref 32–36)
MCV RBC AUTO: 122 FL (ref 82–98)
MONOCYTES NFR BLD MANUAL: 8 % (ref 4–15)
MYELOCYTES NFR BLD MANUAL: 1 %
NEUTROPHILS NFR BLD MANUAL: 76 % (ref 38–73)
NUCLEATED RBC (/100WBC) (OHS): 1 /100 WBC
PHOSPHATE SERPL-MCNC: 3 MG/DL (ref 2.7–4.5)
PLATELET # BLD AUTO: 46 K/UL (ref 150–450)
PLATELET BLD QL SMEAR: ABNORMAL
PMV BLD AUTO: 13 FL (ref 9.2–12.9)
POIKILOCYTOSIS BLD QL SMEAR: SLIGHT
POTASSIUM SERPL-SCNC: 4 MMOL/L (ref 3.5–5.1)
PROT SERPL-MCNC: 5.2 GM/DL (ref 6–8.4)
RBC # BLD AUTO: 2.01 M/UL (ref 4.6–6.2)
RH BLD: NORMAL
SODIUM SERPL-SCNC: 143 MMOL/L (ref 136–145)
SPECIMEN OUTDATE: NORMAL
URATE SERPL-MCNC: 4.8 MG/DL (ref 3.4–7)
WBC # BLD AUTO: 2.41 K/UL (ref 3.9–12.7)

## 2025-07-23 PROCEDURE — 83735 ASSAY OF MAGNESIUM: CPT

## 2025-07-23 PROCEDURE — 36415 COLL VENOUS BLD VENIPUNCTURE: CPT

## 2025-07-23 PROCEDURE — 80053 COMPREHEN METABOLIC PANEL: CPT

## 2025-07-23 PROCEDURE — 87799 DETECT AGENT NOS DNA QUANT: CPT | Mod: 91

## 2025-07-23 PROCEDURE — 84550 ASSAY OF BLOOD/URIC ACID: CPT

## 2025-07-23 PROCEDURE — 86901 BLOOD TYPING SEROLOGIC RH(D): CPT | Performed by: INTERNAL MEDICINE

## 2025-07-23 PROCEDURE — 83615 LACTATE (LD) (LDH) ENZYME: CPT

## 2025-07-23 PROCEDURE — 80197 ASSAY OF TACROLIMUS: CPT

## 2025-07-23 PROCEDURE — 84100 ASSAY OF PHOSPHORUS: CPT

## 2025-07-23 PROCEDURE — 85027 COMPLETE CBC AUTOMATED: CPT

## 2025-07-24 ENCOUNTER — PATIENT OUTREACH (OUTPATIENT)
Facility: OTHER | Age: 70
End: 2025-07-24
Payer: MEDICARE

## 2025-07-24 ENCOUNTER — PATIENT MESSAGE (OUTPATIENT)
Dept: PHARMACY | Facility: HOSPITAL | Age: 70
End: 2025-07-24
Payer: MEDICARE

## 2025-07-24 LAB
CYTOMEGALOVIRUS DNA, QUAL (OHS): NOT DETECTED
EPSTEIN-BARR VIRUS DNA, QUAL (OHS): ABNORMAL
EPSTEIN-BARR VIRUS DNA, QUAL (OHS): NORMAL
EPSTEIN-BARR VIRUS LOG (IU/ML)(OHS): ABNORMAL
EPSTEIN-BARR VIRUS PCR, QUANT (OHS): <50
TACROLIMUS BLD-MCNC: 4.8 NG/ML (ref 5–15)

## 2025-07-24 NOTE — PROGRESS NOTES
BMT Pharmacist Immunosuppression Note:    Reviewed patient's tacrolimus level with Dr. Hickey and it is below goal range.      Current regimen: 0.5 mg  twice daily  Capsule size(s): 0.5 mg    Plan: Continue current regimen.        Lab Results   Component Value Date    TACROLIMUS 4.8 (L) 07/23/2025    TACROLIMUS 2.9 (L) 07/16/2025    TACROLIMUS 7.7 07/11/2025       Creatinine   Date Value Ref Range Status   07/23/2025 1.4 0.5 - 1.4 mg/dL Final   07/16/2025 1.4 0.5 - 1.4 mg/dL Final   07/13/2025 1.5 (H) 0.5 - 1.4 mg/dL Final     Total Bilirubin   Date Value Ref Range Status   03/17/2025 0.5 0.1 - 1.0 mg/dL Final     Comment:     For infants and newborns, interpretation of results should be based  on gestational age, weight and in agreement with clinical  observations.    Premature Infant recommended reference ranges:  Up to 24 hours.............<8.0 mg/dL  Up to 48 hours............<12.0 mg/dL  3-5 days..................<15.0 mg/dL  6-29 days.................<15.0 mg/dL     03/10/2025 0.4 0.1 - 1.0 mg/dL Final     Comment:     For infants and newborns, interpretation of results should be based  on gestational age, weight and in agreement with clinical  observations.    Premature Infant recommended reference ranges:  Up to 24 hours.............<8.0 mg/dL  Up to 48 hours............<12.0 mg/dL  3-5 days..................<15.0 mg/dL  6-29 days.................<15.0 mg/dL     03/03/2025 0.5 0.1 - 1.0 mg/dL Final     Comment:     For infants and newborns, interpretation of results should be based  on gestational age, weight and in agreement with clinical  observations.    Premature Infant recommended reference ranges:  Up to 24 hours.............<8.0 mg/dL  Up to 48 hours............<12.0 mg/dL  3-5 days..................<15.0 mg/dL  6-29 days.................<15.0 mg/dL       Bilirubin Total   Date Value Ref Range Status   07/23/2025 0.9 0.1 - 1.0 mg/dL Final     Comment:     For infants and newborns, interpretation of  results should be based   on gestational age, weight and in agreement with clinical   observations.    Premature Infant recommended reference ranges:   0-24 hours:  <8.0 mg/dL   24-48 hours: <12.0 mg/dL   3-5 days:    <15.0 mg/dL   6-29 days:   <15.0 mg/dL   07/16/2025 0.7 0.1 - 1.0 mg/dL Final     Comment:     For infants and newborns, interpretation of results should be based   on gestational age, weight and in agreement with clinical   observations.    Premature Infant recommended reference ranges:   0-24 hours:  <8.0 mg/dL   24-48 hours: <12.0 mg/dL   3-5 days:    <15.0 mg/dL   6-29 days:   <15.0 mg/dL   07/13/2025 0.5 0.1 - 1.0 mg/dL Final     Comment:     For infants and newborns, interpretation of results should be based   on gestational age, weight and in agreement with clinical   observations.    Premature Infant recommended reference ranges:   0-24 hours:  <8.0 mg/dL   24-48 hours: <12.0 mg/dL   3-5 days:    <15.0 mg/dL   6-29 days:   <15.0 mg/dL     AST   Date Value Ref Range Status   07/23/2025 23 11 - 45 unit/L Final   07/16/2025 24 11 - 45 unit/L Final   07/13/2025 27 11 - 45 unit/L Final   03/17/2025 31 10 - 40 U/L Final   03/10/2025 23 10 - 40 U/L Final   03/03/2025 26 10 - 40 U/L Final     ALT   Date Value Ref Range Status   07/23/2025 22 10 - 44 unit/L Final   07/16/2025 17 10 - 44 unit/L Final   07/13/2025 15 10 - 44 unit/L Final   03/17/2025 35 10 - 44 U/L Final   03/10/2025 31 10 - 44 U/L Final   03/03/2025 36 10 - 44 U/L Final             Terese Cantor, PharmD  Clinical Pharmacy Specialist, Bone Marrow Transplant/Hematology  Spectra link: [phone]

## 2025-07-24 NOTE — PROGRESS NOTES
[facility] pt- Pt is doing fine. ED navigator ensured patient received the resources sent to him. ED navigator ensured there was nothing she could help patient with today. ED navigator reminded patient to reach out if there is ever anything she can assist with. ED navigator plans to follow-up with patient on/around 8/5/2025.    Carley Martinez  ED Navigator  [phone]

## 2025-07-24 NOTE — PROGRESS NOTES
DATE: 8/6/2025  PATIENT: Guillaume Salinas    Supervising Physician: Yosef Hall M.D.    CHIEF COMPLAINT: hospital follow up    HISTORY:  Guillaume Salinas is a 69 y.o. male with a pmhx of CKD III, myelodysplastic syndrome sp bone marrow transplant here for initial evaluation after a fall a few weeks ago. Patient states he was standing/urinating when he suddenly lost consciousness and fell down/hit his left side/the left elbow. He does not believe that he hit his head. He was seen in the ED and CT cervical was done. There is negative associated numbness and tingling. There is negative subjective weakness. Prior treatments have included no previous neck problems, no PT, ESIs, surgery.      The patient denies myelopathic symptoms such as handwriting changes or difficulty with buttons/coins/keys. Denies perineal paresthesias, bowel/bladder dysfunction.    PAST MEDICAL/SURGICAL HISTORY:  Past Medical History:   Diagnosis Date    Anticoagulant long-term use     Coronary artery disease     Hypertension     Myelodysplastic syndrome     Peripheral vascular disease, unspecified      Past Surgical History:   Procedure Laterality Date    BONE MARROW BIOPSY Left 4/26/2023    Procedure: Biopsy-bone marrow;  Surgeon: Harry Diamond MD;  Location: [facility];  Service: Oncology;  Laterality: Left;    COLONOSCOPY N/A 01/05/2022    Procedure: COLONOSCOPY Golytely Vaccinated will bring cards;  Surgeon: Dereje Simon MD;  Location: [facility];  Service: Endoscopy;  Laterality: N/A;  Do not cancel this order    ESOPHAGOGASTRODUODENOSCOPY N/A 5/8/2025    Procedure: EGD (ESOPHAGOGASTRODUODENOSCOPY);  Surgeon: Dudley Toth MD;  Location: [facility];  Service: Endoscopy;  Laterality: N/A;  ref by Gilma Ugalde, SARA,portal-ae    INSERTION OF LEMONS CATHETER Right 9/13/2024    Procedure: INSERTION, CATHETER, CENTRAL VENOUS, LEMONS TRIPLE LUMEN;  Surgeon: Kg Patten MD;  Location: [address]  "[facility];  Service: General;  Laterality: Right;       Medications:  Medications Ordered Prior to Encounter[1]    Social History: Social History[2]    REVIEW OF SYSTEMS:  Constitution: Negative. Negative for chills, fever and night sweats.   Cardiovascular: Negative for chest pain and syncope.   Respiratory: Negative for cough and shortness of breath.   Gastrointestinal: See HPI. Negative for nausea/vomiting. Negative for abdominal pain.  Genitourinary: See HPI. Negative for discoloration or dysuria.  Skin: Negative for dry skin, itching and rash.   Hematologic/Lymphatic: Negative for bleeding problem. Does not bruise/bleed easily.   Musculoskeletal: Negative for falls and muscle weakness.   Neurological: See HPI. No seizures.   Endocrine: Negative for polydipsia, polyphagia and polyuria.   Allergic/Immunologic: Negative for hives and persistent infections.  Psychiatric/Behavioral: Negative for depression and insomnia.         EXAM:  Ht 5' 8" (1.727 m)   Wt 68 kg (149 lb 14.6 oz)   BMI 22.79 kg/m²     General: The patient is a very pleasant 69 y.o. male in no apparent distress, the patient is oriented to person, place and time.  Psych: Normal mood and affect  HEENT: Vision grossly intact, hearing intact to the spoken word.  Lungs: Respirations unlabored.  Gait: Normal station and gait, no difficulty with toe or heel walk.   Skin: Cervical skin negative for rashes, lesions, hairy patches and surgical scars.  Range of motion: Cervical range of motion is acceptable. There is negative tenderness to palpation.  Spinal Balance: Global saggital and coronal spinal balance acceptable, no significant for scoliosis and kyphosis.  Musculoskeletal: No pain with the range of motion of the bilateral shoulders and elbows. Normal bulk and contour of the bilateral hands.  Vascular: Bilateral hands warm and well perfused, radial pulses 2+ bilaterally.  Neurological: Normal strength and tone in all major motor groups in the bilateral " upper and lower extremities. Normal sensation to light touch in the C5-T1 and L2-S1 dermatomes bilaterally.  Deep tendon reflexes symmetric 2+ in the bilateral upper and lower extremities.  Negative Inverted Radial Reflex and Mo's bilaterally. Negative Babinski bilaterally.     IMAGING:   Today I personally reviewed CT cervical that demonstrates probable moderate C3-4 moderate stenosis.    Body mass index is 22.79 kg/m².    Hemoglobin A1C   Date Value Ref Range Status   10/19/2021 5.2 4.0 - 5.6 % Final     Comment:     ADA Screening Guidelines:  5.7-6.4%  Consistent with prediabetes  >or=6.5%  Consistent with diabetes    High levels of fetal hemoglobin interfere with the HbA1C  assay. Heterozygous hemoglobin variants (HbS, HgC, etc)do  not significantly interfere with this assay.   However, presence of multiple variants may affect accuracy.             ASSESSMENT/PLAN:    Guillaume was seen today for follow-up.    Diagnoses and all orders for this visit:    Spondylosis  -     Ambulatory referral/consult to [facility]      Today we discussed at length all of the different treatment options including anti-inflammatories, acetaminophen, rest, ice, heat, physical therapy including strengthening and stretching exercises, home exercises, ROM, aerobic conditioning, aqua therapy, other modalities including ultrasound, massage, and dry needling, epidural steroid injections and finally surgical intervention.      Pt presents with incidental finding of cervical stenosis. No myelopathic symptoms, no radiculopathy. Pt will be mindful of any myelopathic symptoms and fu PRN                 [1]   Current Outpatient Medications on File Prior to Visit   Medication Sig Dispense Refill    acyclovir (ZOVIRAX) 800 MG Tab Take 1 tablet (800 mg total) by mouth 2 (two) times daily. 60 tablet 11    atovaquone (MEPRON) 750 mg/5 mL Susp oral liquid Take 10 mLs (1,500 mg total) by mouth once daily. 210 mL 2    budesonide (ENTOCORT EC) 3 mg  capsule Take 1 capsule (3 mg total) by mouth 3 (three) times daily. 90 capsule 2    carvediloL (COREG) 3.125 MG tablet Take 1 tablet (3.125 mg total) by mouth 2 (two) times daily. 60 tablet 11    cyanocobalamin 1,000 mcg/mL injection Inject 1 mL (1,000 mcg total) into the muscle once a week. 10 mL 2    eltrombopag olamine (PROMACTA) 50 MG Tab Take 2 tablets (100 mg total) by mouth once daily. 60 tablet 11    folic acid (FOLVITE) 1 MG tablet Take 1 tablet (1 mg total) by mouth once daily. 100 tablet 3    gabapentin (NEURONTIN) 300 MG capsule Take 2 capsules (600 mg total) by mouth nightly as needed (Restless leg). 60 capsule 2    guaiFENesin (MUCINEX) 600 mg 12 hr tablet Take 1 tablet (600 mg total) by mouth 2 (two) times daily. 60 tablet 2    hydrocortisone 1 % cream Apply to affected area 2 times daily (Patient not taking: Reported on 7/15/2025) 30 g 1    letermovir (PREVYMIS) 480 mg Tab Take 1 tablet (480 mg total) by mouth Daily. 28 tablet 9    levoFLOXacin (LEVAQUIN) 500 MG tablet Take 1 tablet (500 mg total) by mouth once daily. 30 tablet 5    loperamide (IMODIUM A-D) 2 mg Tab Take 2 mg by mouth 4 (four) times daily as needed.      LORazepam (ATIVAN) 1 MG tablet Take 1 tablet (1 mg total) by mouth once. for 1 dose (Patient not taking: Reported on 7/15/2025) 1 tablet 0    magnesium oxide (MAG-OX) 400 mg (241.3 mg magnesium) tablet Take 2 tablets (800 mg total) by mouth 2 (two) times daily. 120 tablet 11    ondansetron (ZOFRAN-ODT) 8 MG TbDL Take 1 tablet (8 mg total) by mouth every 8 (eight) hours as needed (nausea). 30 tablet 1    pantoprazole (PROTONIX) 40 MG tablet Take 1 tablet (40 mg total) by mouth once daily. 30 tablet 3    posaconazole (NOXAFIL) 100 mg TbEC tablet Take 3 tablets (300 mg total) by mouth once daily. 90 tablet 11    rOPINIRole (REQUIP) 0.5 MG tablet Take 1 tablet (0.5 mg total) by mouth every evening. 30 tablet 11    tacrolimus (PROGRAF) 0.5 MG Cap Take 1 capsule (0.5 mg total) by mouth  every morning AND 1 capsule (0.5 mg total) every evening. 90 capsule 3    tamsulosin (FLOMAX) 0.4 mg Cap Take 1 capsule (0.4 mg total) by mouth once daily. 30 capsule 11    UNABLE TO FIND Take by mouth once daily. medication name: copper      zolpidem (AMBIEN) 5 MG Tab Take 1 tablet (5 mg total) by mouth nightly as needed (insomnia). 30 tablet 0     No current facility-administered medications on file prior to visit.   [2]   Social History  Socioeconomic History    Marital status:    Tobacco Use    Smoking status: Former     Current packs/day: 0.00     Average packs/day: 0.3 packs/day for 50.0 years (12.5 ttl pk-yrs)     Types: Cigarettes     Start date: 3/1/1973     Quit date: 3/1/2023     Years since quittin.4     Passive exposure: Past    Smokeless tobacco: Never   Substance and Sexual Activity    Alcohol use: Not Currently    Drug use: Never    Sexual activity: Not Currently     Partners: Female     Social Drivers of Health     Financial Resource Strain: Patient Declined (2025)    Overall Financial Resource Strain (CARDIA)     Difficulty of Paying Living Expenses: Patient declined   Recent Concern: Financial Resource Strain - Medium Risk (7/10/2025)    Overall Financial Resource Strain (CARDIA)     Difficulty of Paying Living Expenses: Somewhat hard   Food Insecurity: Patient Declined (2025)    Hunger Vital Sign     Worried About Running Out of Food in the Last Year: Patient declined     Ran Out of Food in the Last Year: Patient declined   Recent Concern: Food Insecurity - Food Insecurity Present (7/10/2025)    Hunger Vital Sign     Worried About Running Out of Food in the Last Year: Sometimes true     Ran Out of Food in the Last Year: Sometimes true   Transportation Needs: Patient Declined (2025)    PRAPARE - Transportation     Lack of Transportation (Medical): Patient declined     Lack of Transportation (Non-Medical): Patient declined   Physical Activity: Inactive (7/10/2025)     Exercise Vital Sign     Days of Exercise per Week: 0 days     Minutes of Exercise per Session: 0 min   Stress: Patient Declined (7/11/2025)    Citizen of [place] [employer] - Occupational Stress Questionnaire     Feeling of Stress : Patient declined   Housing Stability: Patient Declined (7/11/2025)    Housing Stability Vital Sign     Unable to Pay for Housing in the Last Year: Patient declined     Number of Times Moved in the Last Year: 1     Homeless in the Last Year: Patient declined

## 2025-07-27 DIAGNOSIS — G47.00 INSOMNIA, UNSPECIFIED TYPE: ICD-10-CM

## 2025-07-28 DIAGNOSIS — Z00.00 ENCOUNTER FOR MEDICARE ANNUAL WELLNESS EXAM: ICD-10-CM

## 2025-07-30 ENCOUNTER — LAB VISIT (OUTPATIENT)
Dept: LAB | Facility: HOSPITAL | Age: 70
End: 2025-07-30
Attending: INTERNAL MEDICINE
Payer: MEDICARE

## 2025-07-30 ENCOUNTER — OFFICE VISIT (OUTPATIENT)
Dept: HEMATOLOGY/ONCOLOGY | Facility: CLINIC | Age: 70
End: 2025-07-30
Payer: MEDICARE

## 2025-07-30 VITALS
OXYGEN SATURATION: 96 % | WEIGHT: 149.94 LBS | SYSTOLIC BLOOD PRESSURE: 140 MMHG | HEART RATE: 88 BPM | TEMPERATURE: 98 F | BODY MASS INDEX: 22.72 KG/M2 | RESPIRATION RATE: 17 BRPM | HEIGHT: 68 IN | DIASTOLIC BLOOD PRESSURE: 63 MMHG

## 2025-07-30 DIAGNOSIS — Z76.82 STEM CELL TRANSPLANT CANDIDATE: ICD-10-CM

## 2025-07-30 DIAGNOSIS — D46.9 MYELODYSPLASTIC SYNDROME: ICD-10-CM

## 2025-07-30 DIAGNOSIS — G47.00 INSOMNIA, UNSPECIFIED TYPE: ICD-10-CM

## 2025-07-30 DIAGNOSIS — J90 PLEURAL EFFUSION: ICD-10-CM

## 2025-07-30 DIAGNOSIS — R53.81 PHYSICAL DECONDITIONING: ICD-10-CM

## 2025-07-30 DIAGNOSIS — I10 PRIMARY HYPERTENSION: ICD-10-CM

## 2025-07-30 DIAGNOSIS — D84.81 IMMUNODEFICIENCY DUE TO CONDITIONS CLASSIFIED ELSEWHERE: ICD-10-CM

## 2025-07-30 DIAGNOSIS — Z94.81 S/P ALLOGENEIC BONE MARROW TRANSPLANT: Primary | ICD-10-CM

## 2025-07-30 DIAGNOSIS — D89.810 ACUTE GVHD: ICD-10-CM

## 2025-07-30 DIAGNOSIS — N40.0 BENIGN PROSTATIC HYPERPLASIA, UNSPECIFIED WHETHER LOWER URINARY TRACT SYMPTOMS PRESENT: ICD-10-CM

## 2025-07-30 DIAGNOSIS — F41.9 ANXIETY: ICD-10-CM

## 2025-07-30 DIAGNOSIS — D69.3 IDIOPATHIC THROMBOCYTOPENIC PURPURA (ITP): ICD-10-CM

## 2025-07-30 DIAGNOSIS — N17.9 AKI (ACUTE KIDNEY INJURY): ICD-10-CM

## 2025-07-30 LAB
ABSOLUTE NEUTROPHIL MANUAL (OHS): 1.2 K/UL
ALBUMIN SERPL BCP-MCNC: 3.1 G/DL (ref 3.5–5.2)
ALP SERPL-CCNC: 83 UNIT/L (ref 40–150)
ALT SERPL W/O P-5'-P-CCNC: 16 UNIT/L (ref 10–44)
ANION GAP (OHS): 7 MMOL/L (ref 8–16)
ANISOCYTOSIS BLD QL SMEAR: SLIGHT
AST SERPL-CCNC: 22 UNIT/L (ref 11–45)
BILIRUB SERPL-MCNC: 0.6 MG/DL (ref 0.1–1)
BUN SERPL-MCNC: 39 MG/DL (ref 8–23)
CALCIUM SERPL-MCNC: 8.5 MG/DL (ref 8.7–10.5)
CHLORIDE SERPL-SCNC: 111 MMOL/L (ref 95–110)
CO2 SERPL-SCNC: 28 MMOL/L (ref 23–29)
CREAT SERPL-MCNC: 1.5 MG/DL (ref 0.5–1.4)
DACRYOCYTES BLD QL SMEAR: ABNORMAL
ERYTHROCYTE [DISTWIDTH] IN BLOOD BY AUTOMATED COUNT: 18 % (ref 11.5–14.5)
GFR SERPLBLD CREATININE-BSD FMLA CKD-EPI: 50 ML/MIN/1.73/M2
GLUCOSE SERPL-MCNC: 160 MG/DL (ref 70–110)
HCT VFR BLD AUTO: 24.7 % (ref 40–54)
HGB BLD-MCNC: 8.1 GM/DL (ref 14–18)
INDIRECT COOMBS: NORMAL
LDH SERPL-CCNC: 345 U/L (ref 110–260)
LYMPHOCYTES NFR BLD MANUAL: 14 % (ref 18–48)
MAGNESIUM SERPL-MCNC: 1.7 MG/DL (ref 1.6–2.6)
MCH RBC QN AUTO: 40.7 PG (ref 27–31)
MCHC RBC AUTO-ENTMCNC: 32.8 G/DL (ref 32–36)
MCV RBC AUTO: 124 FL (ref 82–98)
MONOCYTES NFR BLD MANUAL: 7 % (ref 4–15)
NEUTROPHILS NFR BLD MANUAL: 78 % (ref 38–73)
NEUTS BAND NFR BLD MANUAL: 1 %
NUCLEATED RBC (/100WBC) (OHS): 1 /100 WBC
PHOSPHATE SERPL-MCNC: 4.1 MG/DL (ref 2.7–4.5)
PLATELET # BLD AUTO: 38 K/UL (ref 150–450)
PLATELET BLD QL SMEAR: ABNORMAL
PMV BLD AUTO: 11.4 FL (ref 9.2–12.9)
POIKILOCYTOSIS BLD QL SMEAR: SLIGHT
POLYCHROMASIA BLD QL SMEAR: ABNORMAL
POTASSIUM SERPL-SCNC: 4.3 MMOL/L (ref 3.5–5.1)
PROT SERPL-MCNC: 4.9 GM/DL (ref 6–8.4)
RBC # BLD AUTO: 1.99 M/UL (ref 4.6–6.2)
RH BLD: NORMAL
SODIUM SERPL-SCNC: 146 MMOL/L (ref 136–145)
SPECIMEN OUTDATE: NORMAL
URATE SERPL-MCNC: 4.2 MG/DL (ref 3.4–7)
WBC # BLD AUTO: 1.49 K/UL (ref 3.9–12.7)

## 2025-07-30 PROCEDURE — 84132 ASSAY OF SERUM POTASSIUM: CPT

## 2025-07-30 PROCEDURE — 84100 ASSAY OF PHOSPHORUS: CPT

## 2025-07-30 PROCEDURE — 85025 COMPLETE CBC W/AUTO DIFF WBC: CPT

## 2025-07-30 PROCEDURE — 86850 RBC ANTIBODY SCREEN: CPT | Performed by: INTERNAL MEDICINE

## 2025-07-30 PROCEDURE — 84550 ASSAY OF BLOOD/URIC ACID: CPT

## 2025-07-30 PROCEDURE — 83735 ASSAY OF MAGNESIUM: CPT

## 2025-07-30 PROCEDURE — 99999 PR PBB SHADOW E&M-EST. PATIENT-LVL V: CPT | Mod: PBBFAC,,,

## 2025-07-30 PROCEDURE — 36415 COLL VENOUS BLD VENIPUNCTURE: CPT

## 2025-07-30 PROCEDURE — 80197 ASSAY OF TACROLIMUS: CPT

## 2025-07-30 PROCEDURE — 83615 LACTATE (LD) (LDH) ENZYME: CPT

## 2025-07-30 PROCEDURE — 87799 DETECT AGENT NOS DNA QUANT: CPT

## 2025-07-30 RX ORDER — ELTROMBOPAG 50 MG/1
100 TABLET, FILM COATED ORAL DAILY
Qty: 60 TABLET | Refills: 11 | Status: SHIPPED | OUTPATIENT
Start: 2025-07-30 | End: 2025-07-31

## 2025-07-30 RX ORDER — SODIUM CHLORIDE 0.9 % (FLUSH) 0.9 %
10 SYRINGE (ML) INJECTION
Status: CANCELLED | OUTPATIENT
Start: 2025-07-30

## 2025-07-30 RX ORDER — HEPARIN 100 UNIT/ML
500 SYRINGE INTRAVENOUS
Status: CANCELLED | OUTPATIENT
Start: 2025-07-30

## 2025-07-30 NOTE — PROGRESS NOTES
Section of Hematology and Stem Cell Transplantation    Post-Transplantation Follow Up Visit     Notes:      07/30/2025    Transplant History:   Primary oncologist: Paco Hickey MD  Primary oncologic diagnosis: MDS  Transplant date: 9/19/2024  Donor: haploidentical  Blood Type (Patient): B +  Blood Type (Donor): A +  CMV (Patient): Positive  CMV (Donor): Positive  Graft source: Bone marrow  CD34+ cell dose: 3.55x10^6  Conditioning Regimen: Fludarabine plus melphalan 100 mg/m2 + 2Gy TBI  GVHD prophylaxis: Post-transplant cyclophosphamide, Tacrolimus, MMF  Immediate post-transplant complications: Patient experienced expected GI toxicities with C diff negative diarrhea and nausea, neutropenic fever with negative infectious work up, expected cytopenias requiring transfusions, electrolyte abnormalities requiring replacement, volume overload requiring diuresis, intermittent SOB from pulmonary edema requiring 02, and a hemorrhoid flare up. Diarrhea and SOB improved towards the end of the hospital stay.     History of Present Ilness:   Guillaume Salinas (Guillaume) is a pleasant 69 y.o.male with a past medical history of MDS who is status post haploidentical stem cell transplantation conditioned with FluMel 100 + PTCy+ 2Gy TBI who is currently day +314  who presents for post-transplant follow up.    He went to the ER 7/9/25 for having dizzy spells, pressure issues, fatigue, productive cough, congestion, one episode of vomiting, and diarrhea. He was swabbed in clinic and he tested positive for rhinovirus/enterovirus. CT chest ordered due to abnormal CXR. IV ceftriaxone given for possible superimposed bacterial pneumonia. His CT revealed a left sided pleural effusion and pulmonology consulted however will plan on interval imaging as an outpatient. He was subsequently discharged for OP follow up with pulm and BMT as scheduled.     Today he reports feeling good. He has been a little more fatigued and dizzy since  yesterday. He reports minor diarrhea three day ago, 1-2 episodes. He is still having a productive cough but states overall improvement since leaving the hospital. No fevers or shortness of breath. He is still using the incentive spirometer for breathing exercises. His appetite is doing good. No nausea with budesonide TID. He has not taken promacta for a couple of weeks due the drug company needing an updated prescription.       PAST MEDICAL HISTORY/:   Past Medical History:   Diagnosis Date    Anticoagulant long-term use     Coronary artery disease     Hypertension     Myelodysplastic syndrome     Peripheral vascular disease, unspecified        PAST SURGICAL HISTORY:   Past Surgical History:   Procedure Laterality Date    BONE MARROW BIOPSY Left 2023    Procedure: Biopsy-bone marrow;  Surgeon: Harry Diamond MD;  Location: [facility] OR;  Service: Oncology;  Laterality: Left;    COLONOSCOPY N/A 2022    Procedure: COLONOSCOPY Golytely Vaccinated will bring cards;  Surgeon: Dereje Simon MD;  Location: [facility];  Service: Endoscopy;  Laterality: N/A;  Do not cancel this order    ESOPHAGOGASTRODUODENOSCOPY N/A 2025    Procedure: EGD (ESOPHAGOGASTRODUODENOSCOPY);  Surgeon: Dudley Toth MD;  Location: [facility];  Service: Endoscopy;  Laterality: N/A;  ref by Gilma Ugalde PA-C,portal-ae    INSERTION OF LEMONS CATHETER Right 2024    Procedure: INSERTION, CATHETER, CENTRAL VENOUS, LEMONS TRIPLE LUMEN;  Surgeon: Kg Patten MD;  Location: [address];  Service: General;  Laterality: Right;     PAST SOCIAL HISTORY:  Social History     Socioeconomic History    Marital status:    Tobacco Use    Smoking status: Former     Current packs/day: 0.00     Average packs/day: 0.3 packs/day for 50.0 years (12.5 ttl pk-yrs)     Types: Cigarettes     Start date: 3/1/1973     Quit date: 3/1/2023     Years since quittin.4     Passive exposure: Past    Smokeless  tobacco: Never   Substance and Sexual Activity    Alcohol use: Not Currently    Drug use: Never    Sexual activity: Not Currently     Partners: Female     Social Drivers of Health     Financial Resource Strain: Patient Declined (7/11/2025)    Overall Financial Resource Strain (CARDIA)     Difficulty of Paying Living Expenses: Patient declined   Recent Concern: Financial Resource Strain - Medium Risk (7/10/2025)    Overall Financial Resource Strain (CARDIA)     Difficulty of Paying Living Expenses: Somewhat hard   Food Insecurity: Patient Declined (7/11/2025)    Hunger Vital Sign     Worried About Running Out of Food in the Last Year: Patient declined     Ran Out of Food in the Last Year: Patient declined   Recent Concern: Food Insecurity - Food Insecurity Present (7/10/2025)    Hunger Vital Sign     Worried About Running Out of Food in the Last Year: Sometimes true     Ran Out of Food in the Last Year: Sometimes true   Transportation Needs: Patient Declined (7/11/2025)    PRAPARE - Transportation     Lack of Transportation (Medical): Patient declined     Lack of Transportation (Non-Medical): Patient declined   Physical Activity: Inactive (7/10/2025)    Exercise Vital Sign     Days of Exercise per Week: 0 days     Minutes of Exercise per Session: 0 min   Stress: Patient Declined (7/11/2025)    [employer] - Occupational Stress Questionnaire     Feeling of Stress : Patient declined   Housing Stability: Patient Declined (7/11/2025)    Housing Stability Vital Sign     Unable to Pay for Housing in the Last Year: Patient declined     Number of Times Moved in the Last Year: 1     Homeless in the Last Year: Patient declined       FAMILY HISTORY:  Cancer-related family history includes Cancer in his brother and father.    CURRENT MEDICATIONS:   Medication List with Changes/Refills   Current Medications    ACYCLOVIR (ZOVIRAX) 800 MG TAB    Take 1 tablet (800 mg total) by mouth 2  (two) times daily.    ATOVAQUONE (MEPRON) 750 MG/5 ML SUSP ORAL LIQUID    Take 10 mLs (1,500 mg total) by mouth once daily.    BUDESONIDE (ENTOCORT EC) 3 MG CAPSULE    Take 1 capsule (3 mg total) by mouth 3 (three) times daily.    CARVEDILOL (COREG) 3.125 MG TABLET    Take 1 tablet (3.125 mg total) by mouth 2 (two) times daily.    CYANOCOBALAMIN 1,000 MCG/ML INJECTION    Inject 1 mL (1,000 mcg total) into the muscle once a week.    FOLIC ACID (FOLVITE) 1 MG TABLET    Take 1 tablet (1 mg total) by mouth once daily.    GABAPENTIN (NEURONTIN) 300 MG CAPSULE    Take 2 capsules (600 mg total) by mouth nightly as needed (Restless leg).    GUAIFENESIN (MUCINEX) 600 MG 12 HR TABLET    Take 1 tablet (600 mg total) by mouth 2 (two) times daily.    HYDROCORTISONE 1 % CREAM    Apply to affected area 2 times daily    LETERMOVIR (PREVYMIS) 480 MG TAB    Take 1 tablet (480 mg total) by mouth Daily.    LEVOFLOXACIN (LEVAQUIN) 500 MG TABLET    Take 1 tablet (500 mg total) by mouth once daily.    LOPERAMIDE (IMODIUM A-D) 2 MG TAB    Take 2 mg by mouth 4 (four) times daily as needed.    LORAZEPAM (ATIVAN) 1 MG TABLET    Take 1 tablet (1 mg total) by mouth once. for 1 dose    MAGNESIUM OXIDE (MAG-OX) 400 MG (241.3 MG MAGNESIUM) TABLET    Take 2 tablets (800 mg total) by mouth 2 (two) times daily.    ONDANSETRON (ZOFRAN-ODT) 8 MG TBDL    Take 1 tablet (8 mg total) by mouth every 8 (eight) hours as needed (nausea).    PANTOPRAZOLE (PROTONIX) 40 MG TABLET    Take 1 tablet (40 mg total) by mouth once daily.    POSACONAZOLE (NOXAFIL) 100 MG TBEC TABLET    Take 3 tablets (300 mg total) by mouth once daily.    ROPINIROLE (REQUIP) 0.5 MG TABLET    Take 1 tablet (0.5 mg total) by mouth every evening.    TACROLIMUS (PROGRAF) 0.5 MG CAP    Take 1 capsule (0.5 mg total) by mouth every morning AND 1 capsule (0.5 mg total) every evening.    TAMSULOSIN (FLOMAX) 0.4 MG CAP    Take 1 capsule (0.4 mg total) by mouth once daily.    UNABLE TO FIND    Take  by mouth once daily. medication name: copper    ZOLPIDEM (AMBIEN) 5 MG TAB    Take 1 tablet (5 mg total) by mouth nightly as needed (insomnia).   Changed and/or Refilled Medications    Modified Medication Previous Medication    ELTROMBOPAG OLAMINE (PROMACTA) 50 MG TAB eltrombopag olamine (PROMACTA) 50 MG Tab       Take 2 tablets (100 mg total) by mouth once daily.    Take 2 tablets (100 mg total) by mouth once daily.       ALLERGIES:   Review of patient's allergies indicates:  No Known Allergies    GVHD Review of Systems:     Pertinent positives and negatives included in the HPI. Otherwise a 14 point review of systems is negative. GVHD review of systems recorded in BMT flowsheet.     Physical Exam:     Vitals:    07/30/25 1547   BP: (!) 140/63   Pulse: 88   Resp: 17   Temp: 98.3 °F (36.8 °C)       General: Appears well, NAD.   HEENT: MMM, no OP lesions  Pulmonary: CTAB, no increased work of breathing, no W/R/C  Cardiovascular: S1S2 normal, RRR, no M/R/G  Abdominal: Soft, NT, ND, BS+, no HSM  Extremities: No C/C/E  Neurological: AAOx4, grossly normal, no focal deficits  Dermatologic: No rashes or lesions. Bruising to both arms bilaterally    ECOG Performance Status: (foot note - ECOG PS provided by Eastern Cooperative Oncology Group) 1 - Symptomatic but completely ambulatory    Karnofsky Performance Score:  80%- Normal Activity with Effort: Some Symptoms of Disease    Labs:   Lab Results   Component Value Date    WBC 1.49 (LL) 07/30/2025    HGB 8.1 (L) 07/30/2025    HCT 24.7 (L) 07/30/2025     (H) 07/30/2025    PLT 38 (LL) 07/30/2025        CMP  Sodium   Date Value Ref Range Status   07/30/2025 146 (H) 136 - 145 mmol/L Final   03/17/2025 140 136 - 145 mmol/L Final     Potassium   Date Value Ref Range Status   07/30/2025 4.3 3.5 - 5.1 mmol/L Final   03/17/2025 4.9 3.5 - 5.1 mmol/L Final     Chloride   Date Value Ref Range Status   07/30/2025 111 (H) 95 - 110 mmol/L Final   03/17/2025 105 95 - 110 mmol/L Final      CO2   Date Value Ref Range Status   07/30/2025 28 23 - 29 mmol/L Final   03/17/2025 26 23 - 29 mmol/L Final     Glucose   Date Value Ref Range Status   07/30/2025 160 (H) 70 - 110 mg/dL Final   03/17/2025 109 70 - 110 mg/dL Final     BUN   Date Value Ref Range Status   07/30/2025 39 (H) 8 - 23 mg/dL Final     Creatinine   Date Value Ref Range Status   07/30/2025 1.5 (H) 0.5 - 1.4 mg/dL Final     Calcium   Date Value Ref Range Status   07/30/2025 8.5 (L) 8.7 - 10.5 mg/dL Final   03/17/2025 9.3 8.7 - 10.5 mg/dL Final     Protein Total   Date Value Ref Range Status   07/30/2025 4.9 (L) 6.0 - 8.4 gm/dL Final     Total Protein   Date Value Ref Range Status   03/17/2025 6.1 6.0 - 8.4 g/dL Final     Albumin   Date Value Ref Range Status   07/30/2025 3.1 (L) 3.5 - 5.2 g/dL Final   03/17/2025 3.5 3.5 - 5.2 g/dL Final     Total Bilirubin   Date Value Ref Range Status   03/17/2025 0.5 0.1 - 1.0 mg/dL Final     Comment:     For infants and newborns, interpretation of results should be based  on gestational age, weight and in agreement with clinical  observations.    Premature Infant recommended reference ranges:  Up to 24 hours.............<8.0 mg/dL  Up to 48 hours............<12.0 mg/dL  3-5 days..................<15.0 mg/dL  6-29 days.................<15.0 mg/dL       Bilirubin Total   Date Value Ref Range Status   07/30/2025 0.6 0.1 - 1.0 mg/dL Final     Comment:     For infants and newborns, interpretation of results should be based   on gestational age, weight and in agreement with clinical   observations.    Premature Infant recommended reference ranges:   0-24 hours:  <8.0 mg/dL   24-48 hours: <12.0 mg/dL   3-5 days:    <15.0 mg/dL   6-29 days:   <15.0 mg/dL     Alkaline Phosphatase   Date Value Ref Range Status   03/17/2025 66 40 - 150 U/L Final     ALP   Date Value Ref Range Status   07/30/2025 83 40 - 150 unit/L Final     AST   Date Value Ref Range Status   07/30/2025 22 11 - 45 unit/L Final   03/17/2025 31 10 -  40 U/L Final     ALT   Date Value Ref Range Status   07/30/2025 16 10 - 44 unit/L Final   03/17/2025 35 10 - 44 U/L Final     Anion Gap   Date Value Ref Range Status   07/30/2025 7 (L) 8 - 16 mmol/L Final     eGFR   Date Value Ref Range Status   07/30/2025 50 (L) >60 mL/min/1.73/m2 Final     Comment:     Estimated GFR calculated using the CKD-EPI creatinine (2021) equation.   03/17/2025 >60 >60 mL/min/1.73 m^2 Final          Imaging:   Hospital imaging reviewed.    Pathology:  Prior pathology reviewed.     Acute GVHD Scoring:  GVHD Acute Assessment- Charted               Assessment and Plan:   Guillaume Salinas (Guillaume) is a pleasant 69 y.o.male with a past medical history of MDS s/p haplo SCT who presents for post-transplant follow up.    MDS: Status post treatment with azacitidine 75 mg/m2 daily x7 days plus venetoclax 100mg (voriconazole) daily x14 of 28 days.Venetoclax decreased to 7 days with cycle 3 until completion of 11 cycles. No plans for maintenance at this time.     Status post allogeneic stem cell transplantation: As noted above, status post haploidentical stem cell transplantation conditioned with FluMel 100 + PTCy+ 2Gy TBI . Currently Day+ 314. Engrafted on 10/09/24 day +20.  Day 30 bone marrow (11/5/2024) showing mildly hypercellular marrow with no increased blast (0.3%) and no evidence of myeloid neoplasm. Pending chimerisms, NGS, and CG  Day 100 bone marrow biopsy on 12/31/24 showed 30-40% cellularity, erythroid hyperplasia, dyserythropoiesis, decreased megakaryocytes with atypical morphology. No hemophagocytosis mentioned. NGS, FISH, and CG normal. 100% chimerisms.   Bone marrow biopsy (6/9/25) revealed mildly hypercellular marrow with dyserythropoiesis. CG normal. NGS negative. Chimerisms 100% CD33/CD3. He remains in remission.     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 On 4/23/25 he developed persistent  nausea and appetite suppression; Budesonide 3mg TID started again. 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 Prednisone 0.5 mg/kg started on 5/21/25. His symptoms resolved with oral steroids. During hospitalization he was restarted on budesonide for nausea and appetite suppression. Will start taper next week with symptoms being resolved.  A. Current tacro dose: 0.5 mg BID  B. Last tacro level: 7  C. Adjustments: n/a    Budesonide taper  8/4-8/10: two capsules a day   8/11-8/17: one daily  8/18: stop      Immunosuppression: Prevention with posaconazole, acyclovir. CMV prophylaxis with letermovir. PJP prophyalxis atovaquone. Continue weekly monitoring of CMV and EBV.  Last CMV: Not-detected  last EBV: negative  Active infections: N/A    Pancytopenia: He has had poor graft function since transplant likely due to multiple issues. He was both folate, copper, and B12 deficient, and he is currently taking supplementation. There was concern for possible HLH. Bactrim was changed to atovaquone. He started Promacta in 5/2025, and his dose was recently increased to 100mg daily. At some point in the future, he may need a CD34 selected boost. Will recheck levels next labs. If deficiencies have normalized and aGVHD symptoms remain absent, will consider boost at that point.   Folic Acid deficiency: He remains on folic acid supplementation. His folate levels are now normal so he can stop it.   Copper deficiency: He remains on copper gluconate 2mg daily. Copper stopped due to normalization of levels  B12 deficiency: Continue B12 monthly. Mid August due next    Post transplant lymphoproliferative disorder: Patient received rituxan on 12/30/24 due to possible PTLD with elevated EBV and possible HLH. EBV remains negative.     Hypomagnesemia: Continue MagOx to 800mg twice daily.      Anxiety, Restless Leg Syndrome: Noted since discharge by family. He started ropinirole 0.5mg and has experienced significant  improvement. Continue Ropinirole    Insomnia: Patient now sleeping well and only waking up to urinate at night. Continue Ambien and Ropinirole for RLS.      Hypertension: For awhile patient had been holding off of his carvedilol due to hypotensions and dizziness. His pressures at home have been averaging 170s/90s so he started the carvedilol 6.25mg BID again. He began to complain of orthostatic hypotension symptoms when he takes the medication and BP in clinic is 105/55. His carvedilol was lowered to 3.125 daily. Today his blood pressure is normotensive. For now continue carvedilol 3.125mg BID and will monitor and make adjustments as needed.    Benign prostate hyperplasia: He saw urology 5/5/25 and was started on flomax for BPH.    12. Pleural effusion: Found during hospitalization with CT chest being ordered due to abnormal CXR. IV ceftriaxone given for possible superimposed bacterial pneumonia. His CT revealed a left sided pleural effusion and pulmonology consulted. He will repeat chest CT 8/14/25 and has a follow up with pulmonology in September.     13. ADDISON: Creatine 1.5 and patient having some dizzy spells. IV fluids to be given tomorrow.     14. Physical deconditioning : Referral to PT placed for weakness post transplant      Orders Placed:      Orders Placed This Encounter    eltrombopag olamine (PROMACTA) 50 MG Tab           Follow Up:       BMT Chart Routing      Follow up with physician    Follow up with [facility] . 8/20 Cam   Provider visit type    Infusion scheduling note    Injection scheduling note    Labs CBC, CMP, magnesium, phosphorus, type and screen, CMV, EBV and tacrolimus level   Scheduling:  Preferred lab:  Lab interval: once a week     Imaging    Pharmacy appointment    Other referrals                 A total of 40 minutes was spent in pre-visit chart review, personal interpretation of labs and imaging, and medication review. Total visit time 35 minutes, >50 % counseling.     Visit today included  increased complexity associated with the longitudinal care of the patient due to the serious and/or complex managed problem(s)  MDS s/p Allo SCT    Gilma Ugalde PA-C  Malignant Hematology, Stem Cell Transplant, and Cellular Therapy  [facility]  [facility] [place]

## 2025-07-31 ENCOUNTER — INFUSION (OUTPATIENT)
Dept: INFUSION THERAPY | Facility: HOSPITAL | Age: 70
End: 2025-07-31
Attending: INTERNAL MEDICINE
Payer: MEDICARE

## 2025-07-31 ENCOUNTER — PATIENT MESSAGE (OUTPATIENT)
Dept: HEMATOLOGY/ONCOLOGY | Facility: CLINIC | Age: 70
End: 2025-07-31
Payer: MEDICARE

## 2025-07-31 VITALS
TEMPERATURE: 98 F | SYSTOLIC BLOOD PRESSURE: 152 MMHG | DIASTOLIC BLOOD PRESSURE: 72 MMHG | HEART RATE: 85 BPM | OXYGEN SATURATION: 98 % | RESPIRATION RATE: 17 BRPM

## 2025-07-31 DIAGNOSIS — N17.9 ACUTE KIDNEY INJURY: Primary | ICD-10-CM

## 2025-07-31 LAB
CYTOMEGALOVIRUS DNA, QUAL (OHS): NOT DETECTED
EPSTEIN-BARR VIRUS DNA, QUAL (OHS): NORMAL
TACROLIMUS BLD-MCNC: 7 NG/ML (ref 5–15)

## 2025-07-31 PROCEDURE — A4216 STERILE WATER/SALINE, 10 ML: HCPCS

## 2025-07-31 PROCEDURE — 96360 HYDRATION IV INFUSION INIT: CPT

## 2025-07-31 PROCEDURE — 25000003 PHARM REV CODE 250

## 2025-07-31 RX ORDER — SODIUM CHLORIDE 0.9 % (FLUSH) 0.9 %
10 SYRINGE (ML) INJECTION
Status: DISCONTINUED | OUTPATIENT
Start: 2025-07-31 | End: 2025-07-31 | Stop reason: HOSPADM

## 2025-07-31 RX ORDER — HEPARIN 100 UNIT/ML
500 SYRINGE INTRAVENOUS
Status: DISCONTINUED | OUTPATIENT
Start: 2025-07-31 | End: 2025-07-31 | Stop reason: HOSPADM

## 2025-07-31 RX ORDER — ELTROMBOPAG 50 MG/1
100 TABLET, FILM COATED ORAL DAILY
Qty: 60 TABLET | Refills: 11 | Status: ACTIVE | OUTPATIENT
Start: 2025-07-31

## 2025-07-31 RX ADMIN — SODIUM CHLORIDE 1000 ML: 9 INJECTION, SOLUTION INTRAVENOUS at 01:07

## 2025-07-31 RX ADMIN — Medication 10 ML: at 02:07

## 2025-07-31 NOTE — PLAN OF CARE
Pt tolerated 1 liter IV fluids well.  No complaints at this time. VSS. PIV flushed with NS and D/C per protocol.  Patient left clinic in no acute distress.

## 2025-08-01 NOTE — PROGRESS NOTES
BMT Pharmacist Immunosuppression Note:    Reviewed patient's tacrolimus level with Dr. Hickey and it is within goal range. BUN trending up, but received 1L of fluids.     Current regimen: 0.5mg BID  Capsule size(s): 0.5mg    Plan: Continue current regimen.        Lab Results   Component Value Date    TACROLIMUS 7.0 07/30/2025    TACROLIMUS 4.8 (L) 07/23/2025    TACROLIMUS 2.9 (L) 07/16/2025       Creatinine   Date Value Ref Range Status   07/30/2025 1.5 (H) 0.5 - 1.4 mg/dL Final   07/23/2025 1.4 0.5 - 1.4 mg/dL Final   07/16/2025 1.4 0.5 - 1.4 mg/dL Final     Total Bilirubin   Date Value Ref Range Status   03/17/2025 0.5 0.1 - 1.0 mg/dL Final     Comment:     For infants and newborns, interpretation of results should be based  on gestational age, weight and in agreement with clinical  observations.    Premature Infant recommended reference ranges:  Up to 24 hours.............<8.0 mg/dL  Up to 48 hours............<12.0 mg/dL  3-5 days..................<15.0 mg/dL  6-29 days.................<15.0 mg/dL     03/10/2025 0.4 0.1 - 1.0 mg/dL Final     Comment:     For infants and newborns, interpretation of results should be based  on gestational age, weight and in agreement with clinical  observations.    Premature Infant recommended reference ranges:  Up to 24 hours.............<8.0 mg/dL  Up to 48 hours............<12.0 mg/dL  3-5 days..................<15.0 mg/dL  6-29 days.................<15.0 mg/dL     03/03/2025 0.5 0.1 - 1.0 mg/dL Final     Comment:     For infants and newborns, interpretation of results should be based  on gestational age, weight and in agreement with clinical  observations.    Premature Infant recommended reference ranges:  Up to 24 hours.............<8.0 mg/dL  Up to 48 hours............<12.0 mg/dL  3-5 days..................<15.0 mg/dL  6-29 days.................<15.0 mg/dL       Bilirubin Total   Date Value Ref Range Status   07/30/2025 0.6 0.1 - 1.0 mg/dL Final     Comment:     For infants  and newborns, interpretation of results should be based   on gestational age, weight and in agreement with clinical   observations.    Premature Infant recommended reference ranges:   0-24 hours:  <8.0 mg/dL   24-48 hours: <12.0 mg/dL   3-5 days:    <15.0 mg/dL   6-29 days:   <15.0 mg/dL   07/23/2025 0.9 0.1 - 1.0 mg/dL Final     Comment:     For infants and newborns, interpretation of results should be based   on gestational age, weight and in agreement with clinical   observations.    Premature Infant recommended reference ranges:   0-24 hours:  <8.0 mg/dL   24-48 hours: <12.0 mg/dL   3-5 days:    <15.0 mg/dL   6-29 days:   <15.0 mg/dL   07/16/2025 0.7 0.1 - 1.0 mg/dL Final     Comment:     For infants and newborns, interpretation of results should be based   on gestational age, weight and in agreement with clinical   observations.    Premature Infant recommended reference ranges:   0-24 hours:  <8.0 mg/dL   24-48 hours: <12.0 mg/dL   3-5 days:    <15.0 mg/dL   6-29 days:   <15.0 mg/dL     AST   Date Value Ref Range Status   07/30/2025 22 11 - 45 unit/L Final   07/23/2025 23 11 - 45 unit/L Final   07/16/2025 24 11 - 45 unit/L Final   03/17/2025 31 10 - 40 U/L Final   03/10/2025 23 10 - 40 U/L Final   03/03/2025 26 10 - 40 U/L Final     ALT   Date Value Ref Range Status   07/30/2025 16 10 - 44 unit/L Final   07/23/2025 22 10 - 44 unit/L Final   07/16/2025 17 10 - 44 unit/L Final   03/17/2025 35 10 - 44 U/L Final   03/10/2025 31 10 - 44 U/L Final   03/03/2025 36 10 - 44 U/L Final             Sanjuana Poe, Pharm.D., BCOP  Clinical Pharmacy Specialist, Bone Marrow Transplant/Cellular Therapy  [facility]  SpectraLink: [phone]

## 2025-08-04 ENCOUNTER — PATIENT OUTREACH (OUTPATIENT)
Facility: OTHER | Age: 70
End: 2025-08-04
Payer: MEDICARE

## 2025-08-04 DIAGNOSIS — R35.0 URINARY FREQUENCY: ICD-10-CM

## 2025-08-04 RX ORDER — TAMSULOSIN HYDROCHLORIDE 0.4 MG/1
0.4 CAPSULE ORAL DAILY
Qty: 30 CAPSULE | Refills: 11 | Status: SHIPPED | OUTPATIENT
Start: 2025-08-04 | End: 2026-08-04

## 2025-08-04 NOTE — PROGRESS NOTES
Pt is doing well. Pt was reminded about and will be attending his labs for 2:10 p.m. and spine appointment with Marcela Figueroa PA-C for 3:00 p.m., both on Wednesday. ED navigator ensured there was nothing she could help patient with today. ED navigator reminded patient to reach out if there is ever anything she can assist with. ED navigator plans to follow-up with patient on/around 8/15/2025.    Carley Martinez  ED Navigator  [phone]

## 2025-08-05 RX ORDER — ZOLPIDEM TARTRATE 5 MG/1
5 TABLET ORAL NIGHTLY PRN
Qty: 30 TABLET | Refills: 0 | Status: SHIPPED | OUTPATIENT
Start: 2025-08-05

## 2025-08-06 ENCOUNTER — LAB VISIT (OUTPATIENT)
Dept: LAB | Facility: HOSPITAL | Age: 70
End: 2025-08-06
Payer: MEDICARE

## 2025-08-06 ENCOUNTER — OFFICE VISIT (OUTPATIENT)
Dept: ORTHOPEDICS | Facility: CLINIC | Age: 70
End: 2025-08-06
Payer: MEDICARE

## 2025-08-06 VITALS — WEIGHT: 149.94 LBS | BODY MASS INDEX: 22.72 KG/M2 | HEIGHT: 68 IN

## 2025-08-06 DIAGNOSIS — D46.9 MYELODYSPLASTIC SYNDROME: ICD-10-CM

## 2025-08-06 DIAGNOSIS — M47.9 SPONDYLOSIS: ICD-10-CM

## 2025-08-06 DIAGNOSIS — Z76.82 STEM CELL TRANSPLANT CANDIDATE: ICD-10-CM

## 2025-08-06 LAB
ABSOLUTE NEUTROPHIL MANUAL (OHS): 1.2 K/UL (ref 1.8–7.7)
ALBUMIN SERPL BCP-MCNC: 3.3 G/DL (ref 3.5–5.2)
ALP SERPL-CCNC: 68 UNIT/L (ref 40–150)
ALT SERPL W/O P-5'-P-CCNC: 16 UNIT/L (ref 0–55)
ANION GAP (OHS): 7 MMOL/L (ref 8–16)
ANISOCYTOSIS BLD QL SMEAR: SLIGHT
AST SERPL-CCNC: 24 UNIT/L (ref 0–50)
BILIRUB SERPL-MCNC: 0.7 MG/DL (ref 0.1–1)
BUN SERPL-MCNC: 42 MG/DL (ref 8–23)
CALCIUM SERPL-MCNC: 8.5 MG/DL (ref 8.7–10.5)
CHLORIDE SERPL-SCNC: 107 MMOL/L (ref 95–110)
CO2 SERPL-SCNC: 26 MMOL/L (ref 23–29)
CREAT SERPL-MCNC: 1.7 MG/DL (ref 0.5–1.4)
DACRYOCYTES BLD QL SMEAR: ABNORMAL
ERYTHROCYTE [DISTWIDTH] IN BLOOD BY AUTOMATED COUNT: 16.1 % (ref 11.5–14.5)
GFR SERPLBLD CREATININE-BSD FMLA CKD-EPI: 43 ML/MIN/1.73/M2
GLUCOSE SERPL-MCNC: 131 MG/DL (ref 70–110)
HCT VFR BLD AUTO: 27 % (ref 40–54)
HGB BLD-MCNC: 8.9 GM/DL (ref 14–18)
INDIRECT COOMBS: NORMAL
LDH SERPL-CCNC: 361 U/L (ref 110–260)
LYMPHOCYTES NFR BLD MANUAL: 18 % (ref 18–48)
MAGNESIUM SERPL-MCNC: 1.7 MG/DL (ref 1.6–2.6)
MCH RBC QN AUTO: 40.5 PG (ref 27–31)
MCHC RBC AUTO-ENTMCNC: 33 G/DL (ref 32–36)
MCV RBC AUTO: 123 FL (ref 82–98)
METAMYELOCYTES NFR BLD MANUAL: 1 %
MONOCYTES NFR BLD MANUAL: 9 % (ref 4–15)
NEUTROPHILS NFR BLD MANUAL: 72 % (ref 38–73)
NUCLEATED RBC (/100WBC) (OHS): 0 /100 WBC
PHOSPHATE SERPL-MCNC: 3.6 MG/DL (ref 2.7–4.5)
PLATELET # BLD AUTO: 45 K/UL (ref 150–450)
PLATELET BLD QL SMEAR: ABNORMAL
PMV BLD AUTO: 12.9 FL (ref 9.2–12.9)
POIKILOCYTOSIS BLD QL SMEAR: SLIGHT
POTASSIUM SERPL-SCNC: 4.5 MMOL/L (ref 3.5–5.1)
PROT SERPL-MCNC: 5.4 GM/DL (ref 6–8.4)
RBC # BLD AUTO: 2.2 M/UL (ref 4.6–6.2)
RH BLD: NORMAL
SODIUM SERPL-SCNC: 140 MMOL/L (ref 136–145)
SPECIMEN OUTDATE: NORMAL
URATE SERPL-MCNC: 5.1 MG/DL (ref 3.4–7)
WBC # BLD AUTO: 1.69 K/UL (ref 3.9–12.7)

## 2025-08-06 PROCEDURE — 3288F FALL RISK ASSESSMENT DOCD: CPT | Mod: CPTII,S$GLB,, | Performed by: ORTHOPAEDIC SURGERY

## 2025-08-06 PROCEDURE — 82040 ASSAY OF SERUM ALBUMIN: CPT

## 2025-08-06 PROCEDURE — 80197 ASSAY OF TACROLIMUS: CPT

## 2025-08-06 PROCEDURE — 1126F AMNT PAIN NOTED NONE PRSNT: CPT | Mod: CPTII,S$GLB,, | Performed by: ORTHOPAEDIC SURGERY

## 2025-08-06 PROCEDURE — 1159F MED LIST DOCD IN RCRD: CPT | Mod: CPTII,S$GLB,, | Performed by: ORTHOPAEDIC SURGERY

## 2025-08-06 PROCEDURE — 87799 DETECT AGENT NOS DNA QUANT: CPT

## 2025-08-06 PROCEDURE — 1111F DSCHRG MED/CURRENT MED MERGE: CPT | Mod: CPTII,S$GLB,, | Performed by: ORTHOPAEDIC SURGERY

## 2025-08-06 PROCEDURE — 36415 COLL VENOUS BLD VENIPUNCTURE: CPT

## 2025-08-06 PROCEDURE — 83735 ASSAY OF MAGNESIUM: CPT

## 2025-08-06 PROCEDURE — 85025 COMPLETE CBC W/AUTO DIFF WBC: CPT

## 2025-08-06 PROCEDURE — 84550 ASSAY OF BLOOD/URIC ACID: CPT

## 2025-08-06 PROCEDURE — 84100 ASSAY OF PHOSPHORUS: CPT

## 2025-08-06 PROCEDURE — 99203 OFFICE O/P NEW LOW 30 MIN: CPT | Mod: S$GLB,,, | Performed by: ORTHOPAEDIC SURGERY

## 2025-08-06 PROCEDURE — 99999 PR PBB SHADOW E&M-EST. PATIENT-LVL IV: CPT | Mod: PBBFAC,,, | Performed by: ORTHOPAEDIC SURGERY

## 2025-08-06 PROCEDURE — 1101F PT FALLS ASSESS-DOCD LE1/YR: CPT | Mod: CPTII,S$GLB,, | Performed by: ORTHOPAEDIC SURGERY

## 2025-08-06 PROCEDURE — 83615 LACTATE (LD) (LDH) ENZYME: CPT

## 2025-08-06 PROCEDURE — 86850 RBC ANTIBODY SCREEN: CPT | Performed by: INTERNAL MEDICINE

## 2025-08-06 PROCEDURE — 3008F BODY MASS INDEX DOCD: CPT | Mod: CPTII,S$GLB,, | Performed by: ORTHOPAEDIC SURGERY

## 2025-08-07 ENCOUNTER — TELEPHONE (OUTPATIENT)
Dept: INFUSION THERAPY | Facility: HOSPITAL | Age: 70
End: 2025-08-07
Payer: MEDICARE

## 2025-08-07 NOTE — PROGRESS NOTES
BMT Pharmacist Immunosuppression Note:    Reviewed patient's tacrolimus level with Dr. Hickey and it is within goal range, however renal function continues to trend down.     Infusion able to get patient in for fluids tomorrow.     Current regimen: 0.5mg BID  Capsule size(s): 0.5mg    Plan: Continue current regimen. Considering a dose decrease if renal function continues to decline.         Lab Results   Component Value Date    TACROLIMUS 7.9 08/06/2025    TACROLIMUS 7.0 07/30/2025    TACROLIMUS 4.8 (L) 07/23/2025       Creatinine   Date Value Ref Range Status   08/06/2025 1.7 (H) 0.5 - 1.4 mg/dL Final   07/30/2025 1.5 (H) 0.5 - 1.4 mg/dL Final   07/23/2025 1.4 0.5 - 1.4 mg/dL Final     Total Bilirubin   Date Value Ref Range Status   03/17/2025 0.5 0.1 - 1.0 mg/dL Final     Comment:     For infants and newborns, interpretation of results should be based  on gestational age, weight and in agreement with clinical  observations.    Premature Infant recommended reference ranges:  Up to 24 hours.............<8.0 mg/dL  Up to 48 hours............<12.0 mg/dL  3-5 days..................<15.0 mg/dL  6-29 days.................<15.0 mg/dL     03/10/2025 0.4 0.1 - 1.0 mg/dL Final     Comment:     For infants and newborns, interpretation of results should be based  on gestational age, weight and in agreement with clinical  observations.    Premature Infant recommended reference ranges:  Up to 24 hours.............<8.0 mg/dL  Up to 48 hours............<12.0 mg/dL  3-5 days..................<15.0 mg/dL  6-29 days.................<15.0 mg/dL     03/03/2025 0.5 0.1 - 1.0 mg/dL Final     Comment:     For infants and newborns, interpretation of results should be based  on gestational age, weight and in agreement with clinical  observations.    Premature Infant recommended reference ranges:  Up to 24 hours.............<8.0 mg/dL  Up to 48 hours............<12.0 mg/dL  3-5 days..................<15.0 mg/dL  6-29  days.................<15.0 mg/dL       Bilirubin Total   Date Value Ref Range Status   08/06/2025 0.7 0.1 - 1.0 mg/dL Final     Comment:     For infants and newborns, interpretation of results should be based   on gestational age, weight and in agreement with clinical   observations.    Premature Infant recommended reference ranges:   0-24 hours:  <8.0 mg/dL   24-48 hours: <12.0 mg/dL   3-5 days:    <15.0 mg/dL   6-29 days:   <15.0 mg/dL   07/30/2025 0.6 0.1 - 1.0 mg/dL Final     Comment:     For infants and newborns, interpretation of results should be based   on gestational age, weight and in agreement with clinical   observations.    Premature Infant recommended reference ranges:   0-24 hours:  <8.0 mg/dL   24-48 hours: <12.0 mg/dL   3-5 days:    <15.0 mg/dL   6-29 days:   <15.0 mg/dL   07/23/2025 0.9 0.1 - 1.0 mg/dL Final     Comment:     For infants and newborns, interpretation of results should be based   on gestational age, weight and in agreement with clinical   observations.    Premature Infant recommended reference ranges:   0-24 hours:  <8.0 mg/dL   24-48 hours: <12.0 mg/dL   3-5 days:    <15.0 mg/dL   6-29 days:   <15.0 mg/dL     AST   Date Value Ref Range Status   08/06/2025 24 0 - 50 unit/L Final     Comment:       An activated reagent was used, which may result in slightly higher values compared to standard method.   07/30/2025 22 11 - 45 unit/L Final   07/23/2025 23 11 - 45 unit/L Final   03/17/2025 31 10 - 40 U/L Final   03/10/2025 23 10 - 40 U/L Final   03/03/2025 26 10 - 40 U/L Final     ALT   Date Value Ref Range Status   08/06/2025 16 0 - 55 unit/L Final     Comment:       An activated reagent was used, which may result in slightly higher values compared to standard method.   07/30/2025 16 10 - 44 unit/L Final   07/23/2025 22 10 - 44 unit/L Final   03/17/2025 35 10 - 44 U/L Final   03/10/2025 31 10 - 44 U/L Final   03/03/2025 36 10 - 44 U/L Final             Sanjuana Poe Pharm.D.,  BCOP  Clinical Pharmacy Specialist, Bone Marrow Transplant/Cellular Therapy  [facility]  SpectraLink: [phone]

## 2025-08-07 NOTE — TELEPHONE ENCOUNTER
Called and spoke with pt's spouse. Appointment confirmed for tomorrow 8/8/25 at 10:30 for iv fluids.

## 2025-08-08 ENCOUNTER — INFUSION (OUTPATIENT)
Dept: INFUSION THERAPY | Facility: HOSPITAL | Age: 70
End: 2025-08-08
Attending: INTERNAL MEDICINE
Payer: MEDICARE

## 2025-08-08 VITALS
RESPIRATION RATE: 18 BRPM | HEART RATE: 73 BPM | SYSTOLIC BLOOD PRESSURE: 138 MMHG | OXYGEN SATURATION: 98 % | DIASTOLIC BLOOD PRESSURE: 76 MMHG | TEMPERATURE: 98 F

## 2025-08-08 DIAGNOSIS — N17.9 ACUTE KIDNEY INJURY: Primary | ICD-10-CM

## 2025-08-08 DIAGNOSIS — K12.1 MOUTH ULCER: Primary | ICD-10-CM

## 2025-08-08 PROCEDURE — 25000003 PHARM REV CODE 250

## 2025-08-08 PROCEDURE — A4216 STERILE WATER/SALINE, 10 ML: HCPCS

## 2025-08-08 PROCEDURE — 96360 HYDRATION IV INFUSION INIT: CPT

## 2025-08-08 RX ORDER — SODIUM CHLORIDE 0.9 % (FLUSH) 0.9 %
10 SYRINGE (ML) INJECTION
Status: DISCONTINUED | OUTPATIENT
Start: 2025-08-08 | End: 2025-08-08 | Stop reason: HOSPADM

## 2025-08-08 RX ORDER — HEPARIN 100 UNIT/ML
500 SYRINGE INTRAVENOUS
Status: DISCONTINUED | OUTPATIENT
Start: 2025-08-08 | End: 2025-08-08 | Stop reason: HOSPADM

## 2025-08-08 RX ORDER — HEPARIN 100 UNIT/ML
500 SYRINGE INTRAVENOUS
Status: CANCELLED | OUTPATIENT
Start: 2025-08-08

## 2025-08-08 RX ORDER — SODIUM CHLORIDE 0.9 % (FLUSH) 0.9 %
10 SYRINGE (ML) INJECTION
Status: CANCELLED | OUTPATIENT
Start: 2025-08-08

## 2025-08-08 RX ADMIN — Medication 10 ML: at 12:08

## 2025-08-08 RX ADMIN — SODIUM CHLORIDE 1000 ML: 9 INJECTION, SOLUTION INTRAVENOUS at 11:08

## 2025-08-08 NOTE — PLAN OF CARE
Pt received IV Normal Saline infusion. Pt tolerated it well. AVS given. Pt will follow up with MD. Discharged with no acute distress.

## 2025-08-11 ENCOUNTER — PATIENT MESSAGE (OUTPATIENT)
Dept: HEMATOLOGY/ONCOLOGY | Facility: CLINIC | Age: 70
End: 2025-08-11
Payer: MEDICARE

## 2025-08-11 DIAGNOSIS — K20.90 ESOPHAGITIS: ICD-10-CM

## 2025-08-11 RX ORDER — PANTOPRAZOLE SODIUM 40 MG/1
40 TABLET, DELAYED RELEASE ORAL DAILY
Qty: 30 TABLET | Refills: 3 | Status: SHIPPED | OUTPATIENT
Start: 2025-08-11 | End: 2025-12-09

## 2025-08-12 DIAGNOSIS — D84.81 IMMUNODEFICIENCY DUE TO CONDITIONS CLASSIFIED ELSEWHERE: Primary | ICD-10-CM

## 2025-08-12 RX ORDER — LEVOFLOXACIN 500 MG/1
500 TABLET, FILM COATED ORAL DAILY
Qty: 30 TABLET | Refills: 11 | Status: SHIPPED | OUTPATIENT
Start: 2025-08-12

## 2025-08-13 ENCOUNTER — TELEPHONE (OUTPATIENT)
Dept: HEMATOLOGY/ONCOLOGY | Facility: CLINIC | Age: 70
End: 2025-08-13
Payer: MEDICARE

## 2025-08-13 ENCOUNTER — LAB VISIT (OUTPATIENT)
Dept: LAB | Facility: HOSPITAL | Age: 70
End: 2025-08-13
Attending: INTERNAL MEDICINE
Payer: MEDICARE

## 2025-08-13 DIAGNOSIS — D46.9 MYELODYSPLASTIC SYNDROME: ICD-10-CM

## 2025-08-13 DIAGNOSIS — Z76.82 STEM CELL TRANSPLANT CANDIDATE: ICD-10-CM

## 2025-08-13 LAB
ABO GROUP BLD: NORMAL
ABSOLUTE NEUTROPHIL MANUAL (OHS): 0.9 K/UL (ref 1.8–7.7)
ALBUMIN SERPL BCP-MCNC: 3.4 G/DL (ref 3.5–5.2)
ALP SERPL-CCNC: 58 UNIT/L (ref 40–150)
ALT SERPL W/O P-5'-P-CCNC: 22 UNIT/L (ref 10–44)
ANION GAP (OHS): 8 MMOL/L (ref 8–16)
ANISOCYTOSIS BLD QL SMEAR: SLIGHT
AST SERPL-CCNC: 29 UNIT/L (ref 11–45)
BILIRUB SERPL-MCNC: 0.6 MG/DL (ref 0.1–1)
BUN SERPL-MCNC: 44 MG/DL (ref 8–23)
CALCIUM SERPL-MCNC: 8.9 MG/DL (ref 8.7–10.5)
CHLORIDE SERPL-SCNC: 106 MMOL/L (ref 95–110)
CO2 SERPL-SCNC: 26 MMOL/L (ref 23–29)
CREAT SERPL-MCNC: 2.1 MG/DL (ref 0.5–1.4)
DACRYOCYTES BLD QL SMEAR: ABNORMAL
EOSINOPHIL NFR BLD MANUAL: 2 % (ref 0–8)
ERYTHROCYTE [DISTWIDTH] IN BLOOD BY AUTOMATED COUNT: 15.7 % (ref 11.5–14.5)
GFR SERPLBLD CREATININE-BSD FMLA CKD-EPI: 33 ML/MIN/1.73/M2
GLUCOSE SERPL-MCNC: 112 MG/DL (ref 70–110)
HCT VFR BLD AUTO: 26.6 % (ref 40–54)
HGB BLD-MCNC: 9 GM/DL (ref 14–18)
HYPOCHROMIA BLD QL SMEAR: ABNORMAL
INDIRECT COOMBS: NORMAL
LDH SERPL-CCNC: 440 U/L (ref 110–260)
LYMPHOCYTES NFR BLD MANUAL: 16 % (ref 18–48)
MAGNESIUM SERPL-MCNC: 1.6 MG/DL (ref 1.6–2.6)
MCH RBC QN AUTO: 40.7 PG (ref 27–31)
MCHC RBC AUTO-ENTMCNC: 33.8 G/DL (ref 32–36)
MCV RBC AUTO: 120 FL (ref 82–98)
MONOCYTES NFR BLD MANUAL: 14 % (ref 4–15)
NEUTROPHILS NFR BLD MANUAL: 68 % (ref 38–73)
NUCLEATED RBC (/100WBC) (OHS): 0 /100 WBC
PHOSPHATE SERPL-MCNC: 3 MG/DL (ref 2.7–4.5)
PLATELET # BLD AUTO: 36 K/UL (ref 150–450)
PLATELET BLD QL SMEAR: ABNORMAL
PMV BLD AUTO: 11.8 FL (ref 9.2–12.9)
POIKILOCYTOSIS BLD QL SMEAR: SLIGHT
POLYCHROMASIA BLD QL SMEAR: ABNORMAL
POTASSIUM SERPL-SCNC: 4 MMOL/L (ref 3.5–5.1)
PROT SERPL-MCNC: 5.7 GM/DL (ref 6–8.4)
RBC # BLD AUTO: 2.21 M/UL (ref 4.6–6.2)
RH BLD: NORMAL
SCHISTOCYTES BLD QL SMEAR: PRESENT
SODIUM SERPL-SCNC: 140 MMOL/L (ref 136–145)
SPECIMEN OUTDATE: NORMAL
URATE SERPL-MCNC: 5.9 MG/DL (ref 3.4–7)
WBC # BLD AUTO: 1.35 K/UL (ref 3.9–12.7)

## 2025-08-13 PROCEDURE — 86850 RBC ANTIBODY SCREEN: CPT | Performed by: INTERNAL MEDICINE

## 2025-08-13 PROCEDURE — 87799 DETECT AGENT NOS DNA QUANT: CPT

## 2025-08-13 PROCEDURE — 83615 LACTATE (LD) (LDH) ENZYME: CPT

## 2025-08-13 PROCEDURE — 80053 COMPREHEN METABOLIC PANEL: CPT

## 2025-08-13 PROCEDURE — 85007 BL SMEAR W/DIFF WBC COUNT: CPT

## 2025-08-13 PROCEDURE — 84550 ASSAY OF BLOOD/URIC ACID: CPT

## 2025-08-13 PROCEDURE — 36415 COLL VENOUS BLD VENIPUNCTURE: CPT

## 2025-08-13 PROCEDURE — 86901 BLOOD TYPING SEROLOGIC RH(D): CPT | Performed by: INTERNAL MEDICINE

## 2025-08-13 PROCEDURE — 80197 ASSAY OF TACROLIMUS: CPT

## 2025-08-13 PROCEDURE — 86900 BLOOD TYPING SEROLOGIC ABO: CPT | Performed by: INTERNAL MEDICINE

## 2025-08-13 PROCEDURE — 83735 ASSAY OF MAGNESIUM: CPT

## 2025-08-13 PROCEDURE — 84100 ASSAY OF PHOSPHORUS: CPT

## 2025-08-13 RX ORDER — HEPARIN 100 UNIT/ML
500 SYRINGE INTRAVENOUS
Status: CANCELLED | OUTPATIENT
Start: 2025-08-13

## 2025-08-13 RX ORDER — TACROLIMUS 0.5 MG/1
0.5 CAPSULE ORAL EVERY MORNING
Qty: 30 CAPSULE | Refills: 3 | Status: SHIPPED | OUTPATIENT
Start: 2025-08-13

## 2025-08-13 RX ORDER — SODIUM CHLORIDE 0.9 % (FLUSH) 0.9 %
10 SYRINGE (ML) INJECTION
Status: CANCELLED | OUTPATIENT
Start: 2025-08-13

## 2025-08-14 ENCOUNTER — INFUSION (OUTPATIENT)
Dept: INFUSION THERAPY | Facility: HOSPITAL | Age: 70
End: 2025-08-14
Attending: INTERNAL MEDICINE
Payer: MEDICARE

## 2025-08-14 ENCOUNTER — HOSPITAL ENCOUNTER (OUTPATIENT)
Dept: RADIOLOGY | Facility: HOSPITAL | Age: 70
Discharge: HOME | End: 2025-08-14
Attending: STUDENT IN AN ORGANIZED HEALTH CARE EDUCATION/TRAINING PROGRAM
Payer: MEDICARE

## 2025-08-14 VITALS
TEMPERATURE: 98 F | RESPIRATION RATE: 18 BRPM | HEART RATE: 84 BPM | SYSTOLIC BLOOD PRESSURE: 121 MMHG | OXYGEN SATURATION: 98 % | DIASTOLIC BLOOD PRESSURE: 74 MMHG

## 2025-08-14 DIAGNOSIS — N17.9 AKI (ACUTE KIDNEY INJURY): Primary | ICD-10-CM

## 2025-08-14 DIAGNOSIS — N17.9 ACUTE KIDNEY INJURY: Primary | ICD-10-CM

## 2025-08-14 DIAGNOSIS — J18.9 PNEUMONIA OF LEFT LOWER LOBE DUE TO INFECTIOUS ORGANISM: ICD-10-CM

## 2025-08-14 LAB
CYTOMEGALOVIRUS DNA, QUAL (OHS): NOT DETECTED
EPSTEIN-BARR VIRUS DNA, QUAL (OHS): NORMAL
TACROLIMUS BLD-MCNC: 7.2 NG/ML (ref 5–15)

## 2025-08-14 PROCEDURE — 25000003 PHARM REV CODE 250

## 2025-08-14 PROCEDURE — 71250 CT THORAX DX C-: CPT | Mod: TC

## 2025-08-14 PROCEDURE — 96360 HYDRATION IV INFUSION INIT: CPT

## 2025-08-14 PROCEDURE — 71250 CT THORAX DX C-: CPT | Mod: 26,,, | Performed by: RADIOLOGY

## 2025-08-14 PROCEDURE — A4216 STERILE WATER/SALINE, 10 ML: HCPCS

## 2025-08-14 RX ORDER — HEPARIN 100 UNIT/ML
500 SYRINGE INTRAVENOUS
Status: DISCONTINUED | OUTPATIENT
Start: 2025-08-14 | End: 2025-08-14 | Stop reason: HOSPADM

## 2025-08-14 RX ORDER — SODIUM CHLORIDE 0.9 % (FLUSH) 0.9 %
10 SYRINGE (ML) INJECTION
Status: DISCONTINUED | OUTPATIENT
Start: 2025-08-14 | End: 2025-08-14 | Stop reason: HOSPADM

## 2025-08-14 RX ADMIN — SODIUM CHLORIDE 1000 ML: 9 INJECTION, SOLUTION INTRAVENOUS at 08:08

## 2025-08-14 RX ADMIN — Medication 10 ML: at 09:08

## 2025-08-15 ENCOUNTER — HOSPITAL ENCOUNTER (OUTPATIENT)
Dept: RADIOLOGY | Facility: HOSPITAL | Age: 70
Discharge: HOME | End: 2025-08-15
Attending: INTERNAL MEDICINE
Payer: MEDICARE

## 2025-08-15 ENCOUNTER — PATIENT OUTREACH (OUTPATIENT)
Facility: OTHER | Age: 70
End: 2025-08-15
Payer: MEDICARE

## 2025-08-15 DIAGNOSIS — N17.9 AKI (ACUTE KIDNEY INJURY): ICD-10-CM

## 2025-08-15 PROCEDURE — 76770 US EXAM ABDO BACK WALL COMP: CPT | Mod: TC

## 2025-08-15 PROCEDURE — 76770 US EXAM ABDO BACK WALL COMP: CPT | Mod: 26,,, | Performed by: RADIOLOGY

## 2025-08-18 ENCOUNTER — CLINICAL SUPPORT (OUTPATIENT)
Dept: REHABILITATION | Facility: HOSPITAL | Age: 70
End: 2025-08-18
Payer: MEDICARE

## 2025-08-18 DIAGNOSIS — R53.81 PHYSICAL DECONDITIONING: Primary | ICD-10-CM

## 2025-08-18 DIAGNOSIS — B34.8 RHINOVIRUS: ICD-10-CM

## 2025-08-18 PROCEDURE — 97162 PT EVAL MOD COMPLEX 30 MIN: CPT | Mod: PN

## 2025-08-19 ENCOUNTER — PATIENT MESSAGE (OUTPATIENT)
Dept: HEMATOLOGY/ONCOLOGY | Facility: CLINIC | Age: 70
End: 2025-08-19
Payer: MEDICARE

## 2025-08-20 ENCOUNTER — OFFICE VISIT (OUTPATIENT)
Dept: HEMATOLOGY/ONCOLOGY | Facility: CLINIC | Age: 70
End: 2025-08-20
Payer: MEDICARE

## 2025-08-20 ENCOUNTER — LAB VISIT (OUTPATIENT)
Dept: LAB | Facility: HOSPITAL | Age: 70
End: 2025-08-20
Attending: INTERNAL MEDICINE
Payer: MEDICARE

## 2025-08-20 VITALS
HEIGHT: 68 IN | SYSTOLIC BLOOD PRESSURE: 100 MMHG | DIASTOLIC BLOOD PRESSURE: 62 MMHG | BODY MASS INDEX: 21.03 KG/M2 | WEIGHT: 138.75 LBS | OXYGEN SATURATION: 96 % | RESPIRATION RATE: 18 BRPM | TEMPERATURE: 98 F | HEART RATE: 95 BPM

## 2025-08-20 DIAGNOSIS — G25.81 RESTLESS LEG SYNDROME: ICD-10-CM

## 2025-08-20 DIAGNOSIS — J90 PLEURAL EFFUSION: ICD-10-CM

## 2025-08-20 DIAGNOSIS — D61.818 PANCYTOPENIA: ICD-10-CM

## 2025-08-20 DIAGNOSIS — R19.7 DIARRHEA, UNSPECIFIED TYPE: ICD-10-CM

## 2025-08-20 DIAGNOSIS — D89.810 ACUTE GVHD: ICD-10-CM

## 2025-08-20 DIAGNOSIS — N17.9 AKI (ACUTE KIDNEY INJURY): ICD-10-CM

## 2025-08-20 DIAGNOSIS — Z76.82 STEM CELL TRANSPLANT CANDIDATE: ICD-10-CM

## 2025-08-20 DIAGNOSIS — R53.81 PHYSICAL DECONDITIONING: ICD-10-CM

## 2025-08-20 DIAGNOSIS — D84.81 IMMUNODEFICIENCY DUE TO CONDITIONS CLASSIFIED ELSEWHERE: ICD-10-CM

## 2025-08-20 DIAGNOSIS — E83.42 HYPOMAGNESEMIA: ICD-10-CM

## 2025-08-20 DIAGNOSIS — F41.9 ANXIETY: ICD-10-CM

## 2025-08-20 DIAGNOSIS — D46.9 MYELODYSPLASTIC SYNDROME: Primary | ICD-10-CM

## 2025-08-20 DIAGNOSIS — D46.9 MYELODYSPLASTIC SYNDROME: ICD-10-CM

## 2025-08-20 DIAGNOSIS — Z94.81 S/P ALLOGENEIC BONE MARROW TRANSPLANT: ICD-10-CM

## 2025-08-20 DIAGNOSIS — N40.0 BENIGN PROSTATIC HYPERPLASIA, UNSPECIFIED WHETHER LOWER URINARY TRACT SYMPTOMS PRESENT: ICD-10-CM

## 2025-08-20 LAB
ABSOLUTE NEUTROPHIL MANUAL (OHS): 1 K/UL (ref 1.8–7.7)
ALBUMIN SERPL BCP-MCNC: 3.6 G/DL (ref 3.5–5.2)
ALP SERPL-CCNC: 56 UNIT/L (ref 40–150)
ALT SERPL W/O P-5'-P-CCNC: 25 UNIT/L (ref 10–44)
ANION GAP (OHS): 11 MMOL/L (ref 8–16)
ANISOCYTOSIS BLD QL SMEAR: SLIGHT
AST SERPL-CCNC: 30 UNIT/L (ref 11–45)
BASOPHILS NFR BLD MANUAL: 1 %
BILIRUB SERPL-MCNC: 0.8 MG/DL (ref 0.1–1)
BUN SERPL-MCNC: 30 MG/DL (ref 8–23)
CALCIUM SERPL-MCNC: 9.2 MG/DL (ref 8.7–10.5)
CHLORIDE SERPL-SCNC: 102 MMOL/L (ref 95–110)
CO2 SERPL-SCNC: 26 MMOL/L (ref 23–29)
CREAT SERPL-MCNC: 2.2 MG/DL (ref 0.5–1.4)
DACRYOCYTES BLD QL SMEAR: ABNORMAL
EOSINOPHIL NFR BLD MANUAL: 2 % (ref 0–8)
ERYTHROCYTE [DISTWIDTH] IN BLOOD BY AUTOMATED COUNT: 14.6 % (ref 11.5–14.5)
GFR SERPLBLD CREATININE-BSD FMLA CKD-EPI: 32 ML/MIN/1.73/M2
GLUCOSE SERPL-MCNC: 121 MG/DL (ref 70–110)
HCT VFR BLD AUTO: 28.6 % (ref 40–54)
HGB BLD-MCNC: 9.7 GM/DL (ref 14–18)
HYPOCHROMIA BLD QL SMEAR: ABNORMAL
INDIRECT COOMBS: NORMAL
LYMPHOCYTES NFR BLD MANUAL: 13 % (ref 18–48)
MAGNESIUM SERPL-MCNC: 2.1 MG/DL (ref 1.6–2.6)
MCH RBC QN AUTO: 40.1 PG (ref 27–31)
MCHC RBC AUTO-ENTMCNC: 33.9 G/DL (ref 32–36)
MCV RBC AUTO: 118 FL (ref 82–98)
MONOCYTES NFR BLD MANUAL: 8 % (ref 4–15)
NEUTROPHILS NFR BLD MANUAL: 76 % (ref 38–73)
NUCLEATED RBC (/100WBC) (OHS): 0 /100 WBC
PHOSPHATE SERPL-MCNC: 4.1 MG/DL (ref 2.7–4.5)
PLATELET # BLD AUTO: 34 K/UL (ref 150–450)
PLATELET BLD QL SMEAR: ABNORMAL
PMV BLD AUTO: 10.8 FL (ref 9.2–12.9)
POIKILOCYTOSIS BLD QL SMEAR: SLIGHT
POTASSIUM SERPL-SCNC: 4.6 MMOL/L (ref 3.5–5.1)
PROT SERPL-MCNC: 6 GM/DL (ref 6–8.4)
RBC # BLD AUTO: 2.42 M/UL (ref 4.6–6.2)
RH BLD: NORMAL
SCHISTOCYTES BLD QL SMEAR: PRESENT
SODIUM SERPL-SCNC: 139 MMOL/L (ref 136–145)
SPECIMEN OUTDATE: NORMAL
WBC # BLD AUTO: 1.25 K/UL (ref 3.9–12.7)

## 2025-08-20 PROCEDURE — 36415 COLL VENOUS BLD VENIPUNCTURE: CPT

## 2025-08-20 PROCEDURE — 86901 BLOOD TYPING SEROLOGIC RH(D): CPT | Performed by: INTERNAL MEDICINE

## 2025-08-20 PROCEDURE — 84100 ASSAY OF PHOSPHORUS: CPT

## 2025-08-20 PROCEDURE — 87632 RESP VIRUS 6-11 TARGETS: CPT

## 2025-08-20 PROCEDURE — 85027 COMPLETE CBC AUTOMATED: CPT

## 2025-08-20 PROCEDURE — 80197 ASSAY OF TACROLIMUS: CPT

## 2025-08-20 PROCEDURE — 82040 ASSAY OF SERUM ALBUMIN: CPT

## 2025-08-20 PROCEDURE — 83735 ASSAY OF MAGNESIUM: CPT

## 2025-08-20 PROCEDURE — 87799 DETECT AGENT NOS DNA QUANT: CPT

## 2025-08-20 PROCEDURE — 99999 PR PBB SHADOW E&M-EST. PATIENT-LVL IV: CPT | Mod: PBBFAC,,,

## 2025-08-20 RX ORDER — FOLIC ACID 1 MG/1
1000 TABLET ORAL
COMMUNITY
End: 2025-08-21

## 2025-08-20 RX ORDER — PREDNISONE 10 MG/1
30 TABLET ORAL DAILY
Qty: 90 TABLET | Refills: 0 | Status: SHIPPED | OUTPATIENT
Start: 2025-08-20

## 2025-08-21 ENCOUNTER — TELEPHONE (OUTPATIENT)
Dept: INTERVENTIONAL RADIOLOGY/VASCULAR | Facility: CLINIC | Age: 70
End: 2025-08-21
Payer: MEDICARE

## 2025-08-21 ENCOUNTER — INFUSION (OUTPATIENT)
Dept: INFUSION THERAPY | Facility: HOSPITAL | Age: 70
End: 2025-08-21
Attending: INTERNAL MEDICINE
Payer: MEDICARE

## 2025-08-21 VITALS
TEMPERATURE: 98 F | HEART RATE: 78 BPM | OXYGEN SATURATION: 97 % | RESPIRATION RATE: 18 BRPM | SYSTOLIC BLOOD PRESSURE: 121 MMHG | DIASTOLIC BLOOD PRESSURE: 73 MMHG

## 2025-08-21 DIAGNOSIS — N17.9 ACUTE KIDNEY INJURY: Primary | ICD-10-CM

## 2025-08-21 DIAGNOSIS — D89.810 ACUTE GVHD: Primary | ICD-10-CM

## 2025-08-21 LAB
CYTOMEGALOVIRUS DNA, QUAL (OHS): NOT DETECTED
EPSTEIN-BARR VIRUS DNA, QUAL (OHS): NORMAL
TACROLIMUS BLD-MCNC: 8 NG/ML (ref 5–15)

## 2025-08-21 PROCEDURE — 25000003 PHARM REV CODE 250: Performed by: INTERNAL MEDICINE

## 2025-08-21 PROCEDURE — 96360 HYDRATION IV INFUSION INIT: CPT

## 2025-08-21 RX ORDER — SODIUM CHLORIDE 0.9 % (FLUSH) 0.9 %
10 SYRINGE (ML) INJECTION
Status: DISCONTINUED | OUTPATIENT
Start: 2025-08-21 | End: 2025-08-21 | Stop reason: HOSPADM

## 2025-08-21 RX ORDER — HEPARIN 100 UNIT/ML
500 SYRINGE INTRAVENOUS
Status: DISCONTINUED | OUTPATIENT
Start: 2025-08-21 | End: 2025-08-21 | Stop reason: HOSPADM

## 2025-08-21 RX ADMIN — SODIUM CHLORIDE 1000 ML: 9 INJECTION, SOLUTION INTRAVENOUS at 01:08

## 2025-08-22 LAB
V ADENOVIRUS: NOT DETECTED
V ENTEROVIRUS/RHINOVIRUS: NOT DETECTED
V HUMAN BOCAVIRUS: NOT DETECTED
V HUMAN CORONAVIRUS: NOT DETECTED
V HUMAN METAPNEUMOVIRUS: NOT DETECTED
V INFLUENZA A - H1N1-09: NOT DETECTED
V INFLUENZA A - HUMAN: NOT DETECTED
V INFLUENZA B: NOT DETECTED
V PARAINFLUENZA: NOT DETECTED
V RESPIRATORY SYNCYTIAL: NOT DETECTED

## 2025-08-28 ENCOUNTER — PATIENT MESSAGE (OUTPATIENT)
Dept: HEMATOLOGY/ONCOLOGY | Facility: CLINIC | Age: 70
End: 2025-08-28
Payer: MEDICARE

## 2025-08-28 DIAGNOSIS — I10 PRIMARY HYPERTENSION: ICD-10-CM

## 2025-08-28 DIAGNOSIS — D84.822 IMMUNODEFICIENCY DUE TO EXTERNAL CAUSE: ICD-10-CM

## 2025-08-28 RX ORDER — GUAIFENESIN 600 MG/1
600 TABLET, EXTENDED RELEASE ORAL 2 TIMES DAILY
Qty: 60 TABLET | Refills: 2 | Status: SHIPPED | OUTPATIENT
Start: 2025-08-28 | End: 2026-08-28

## 2025-08-29 DIAGNOSIS — Z94.84 IMMUNOCOMPROMISED STATE ASSOCIATED WITH STEM CELL TRANSPLANT: ICD-10-CM

## 2025-08-29 DIAGNOSIS — D84.822 IMMUNOCOMPROMISED STATE ASSOCIATED WITH STEM CELL TRANSPLANT: ICD-10-CM

## 2025-08-29 RX ORDER — LIDOCAINE HYDROCHLORIDE 10 MG/ML
1 INJECTION, SOLUTION EPIDURAL; INFILTRATION; INTRACAUDAL; PERINEURAL ONCE
Status: CANCELLED | OUTPATIENT
Start: 2025-08-29 | End: 2025-08-29

## 2025-09-02 ENCOUNTER — TELEPHONE (OUTPATIENT)
Dept: INTERVENTIONAL RADIOLOGY/VASCULAR | Facility: HOSPITAL | Age: 70
End: 2025-09-02
Payer: MEDICARE

## 2025-09-02 ENCOUNTER — TELEPHONE (OUTPATIENT)
Dept: HEMATOLOGY/ONCOLOGY | Facility: CLINIC | Age: 70
End: 2025-09-02
Payer: MEDICARE

## 2025-09-02 ENCOUNTER — LAB VISIT (OUTPATIENT)
Dept: LAB | Facility: HOSPITAL | Age: 70
End: 2025-09-02
Attending: INTERNAL MEDICINE
Payer: MEDICARE

## 2025-09-02 DIAGNOSIS — Z76.82 STEM CELL TRANSPLANT CANDIDATE: ICD-10-CM

## 2025-09-02 DIAGNOSIS — Z94.84 IMMUNOCOMPROMISED STATE ASSOCIATED WITH STEM CELL TRANSPLANT: ICD-10-CM

## 2025-09-02 DIAGNOSIS — D84.822 IMMUNOCOMPROMISED STATE ASSOCIATED WITH STEM CELL TRANSPLANT: ICD-10-CM

## 2025-09-02 DIAGNOSIS — D46.9 MYELODYSPLASTIC SYNDROME: ICD-10-CM

## 2025-09-02 LAB
ABSOLUTE NEUTROPHIL MANUAL (OHS): 1.5 K/UL (ref 1.8–7.7)
ALBUMIN SERPL BCP-MCNC: 3.4 G/DL (ref 3.5–5.2)
ALP SERPL-CCNC: 66 UNIT/L (ref 40–150)
ALT SERPL W/O P-5'-P-CCNC: 32 UNIT/L (ref 10–44)
ANION GAP (OHS): 9 MMOL/L (ref 8–16)
ANISOCYTOSIS BLD QL SMEAR: SLIGHT
AST SERPL-CCNC: 26 UNIT/L (ref 11–45)
BILIRUB SERPL-MCNC: 0.8 MG/DL (ref 0.1–1)
BUN SERPL-MCNC: 47 MG/DL (ref 8–23)
CALCIUM SERPL-MCNC: 9.1 MG/DL (ref 8.7–10.5)
CHLORIDE SERPL-SCNC: 101 MMOL/L (ref 95–110)
CO2 SERPL-SCNC: 28 MMOL/L (ref 23–29)
CREAT SERPL-MCNC: 2.3 MG/DL (ref 0.5–1.4)
DACRYOCYTES BLD QL SMEAR: ABNORMAL
ERYTHROCYTE [DISTWIDTH] IN BLOOD BY AUTOMATED COUNT: 15.1 % (ref 11.5–14.5)
GFR SERPLBLD CREATININE-BSD FMLA CKD-EPI: 30 ML/MIN/1.73/M2
GLUCOSE SERPL-MCNC: 167 MG/DL (ref 70–110)
HCT VFR BLD AUTO: 27 % (ref 40–54)
HGB BLD-MCNC: 9.1 GM/DL (ref 14–18)
HYPOCHROMIA BLD QL SMEAR: ABNORMAL
INDIRECT COOMBS: NORMAL
LDH SERPL-CCNC: 401 U/L (ref 110–260)
LYMPHOCYTES NFR BLD MANUAL: 18 % (ref 18–48)
MAGNESIUM SERPL-MCNC: 3 MG/DL (ref 1.6–2.6)
MCH RBC QN AUTO: 39.1 PG (ref 27–31)
MCHC RBC AUTO-ENTMCNC: 33.7 G/DL (ref 32–36)
MCV RBC AUTO: 116 FL (ref 82–98)
NEUTROPHILS NFR BLD MANUAL: 82 % (ref 38–73)
NUCLEATED RBC (/100WBC) (OHS): 0 /100 WBC
OVALOCYTES BLD QL SMEAR: ABNORMAL
PHOSPHATE SERPL-MCNC: 4.6 MG/DL (ref 2.7–4.5)
PLATELET # BLD AUTO: 27 K/UL (ref 150–450)
PLATELET BLD QL SMEAR: ABNORMAL
PMV BLD AUTO: ABNORMAL FL
POIKILOCYTOSIS BLD QL SMEAR: SLIGHT
POLYCHROMASIA BLD QL SMEAR: ABNORMAL
POTASSIUM SERPL-SCNC: 4.5 MMOL/L (ref 3.5–5.1)
PROT SERPL-MCNC: 5.3 GM/DL (ref 6–8.4)
RBC # BLD AUTO: 2.33 M/UL (ref 4.6–6.2)
RH BLD: NORMAL
SODIUM SERPL-SCNC: 138 MMOL/L (ref 136–145)
SPECIMEN OUTDATE: NORMAL
URATE SERPL-MCNC: 6 MG/DL (ref 3.4–7)
WBC # BLD AUTO: 1.87 K/UL (ref 3.9–12.7)

## 2025-09-02 PROCEDURE — 85007 BL SMEAR W/DIFF WBC COUNT: CPT

## 2025-09-02 PROCEDURE — 83615 LACTATE (LD) (LDH) ENZYME: CPT

## 2025-09-02 PROCEDURE — 86850 RBC ANTIBODY SCREEN: CPT | Performed by: INTERNAL MEDICINE

## 2025-09-02 PROCEDURE — 36415 COLL VENOUS BLD VENIPUNCTURE: CPT

## 2025-09-02 PROCEDURE — 80197 ASSAY OF TACROLIMUS: CPT

## 2025-09-02 PROCEDURE — 84295 ASSAY OF SERUM SODIUM: CPT

## 2025-09-02 PROCEDURE — 84100 ASSAY OF PHOSPHORUS: CPT

## 2025-09-02 PROCEDURE — 83735 ASSAY OF MAGNESIUM: CPT

## 2025-09-02 PROCEDURE — 84550 ASSAY OF BLOOD/URIC ACID: CPT

## 2025-09-02 RX ORDER — ATOVAQUONE 750 MG/5ML
1500 SUSPENSION ORAL DAILY
Qty: 210 ML | Refills: 2 | Status: SHIPPED | OUTPATIENT
Start: 2025-09-02

## 2025-09-02 RX ORDER — CARVEDILOL 3.12 MG/1
3.12 TABLET ORAL 2 TIMES DAILY
Qty: 60 TABLET | Refills: 11 | Status: SHIPPED | OUTPATIENT
Start: 2025-09-02 | End: 2026-09-02

## 2025-09-03 ENCOUNTER — HOSPITAL ENCOUNTER (OUTPATIENT)
Dept: INTERVENTIONAL RADIOLOGY/VASCULAR | Facility: HOSPITAL | Age: 70
Discharge: HOME | End: 2025-09-03
Attending: INTERNAL MEDICINE
Payer: MEDICARE

## 2025-09-03 ENCOUNTER — PATIENT MESSAGE (OUTPATIENT)
Dept: HEMATOLOGY/ONCOLOGY | Facility: CLINIC | Age: 70
End: 2025-09-03
Payer: MEDICARE

## 2025-09-03 VITALS
BODY MASS INDEX: 20.92 KG/M2 | SYSTOLIC BLOOD PRESSURE: 148 MMHG | WEIGHT: 138 LBS | TEMPERATURE: 98 F | OXYGEN SATURATION: 98 % | RESPIRATION RATE: 19 BRPM | DIASTOLIC BLOOD PRESSURE: 74 MMHG | HEART RATE: 77 BPM | HEIGHT: 68 IN

## 2025-09-03 DIAGNOSIS — D46.22 MYELODYSPLASTIC SYNDROME WITH EXCESS BLASTS-2: Primary | ICD-10-CM

## 2025-09-03 DIAGNOSIS — D89.810 ACUTE GVHD: ICD-10-CM

## 2025-09-03 DIAGNOSIS — Z94.84 IMMUNOCOMPROMISED STATE ASSOCIATED WITH STEM CELL TRANSPLANT: ICD-10-CM

## 2025-09-03 DIAGNOSIS — D84.822 IMMUNOCOMPROMISED STATE ASSOCIATED WITH STEM CELL TRANSPLANT: ICD-10-CM

## 2025-09-03 LAB — TACROLIMUS BLD-MCNC: 4.9 NG/ML (ref 5–15)

## 2025-09-03 PROCEDURE — C1894 INTRO/SHEATH, NON-LASER: HCPCS | Performed by: STUDENT IN AN ORGANIZED HEALTH CARE EDUCATION/TRAINING PROGRAM

## 2025-09-03 PROCEDURE — C1788 PORT, INDWELLING, IMP: HCPCS | Performed by: STUDENT IN AN ORGANIZED HEALTH CARE EDUCATION/TRAINING PROGRAM

## 2025-09-03 PROCEDURE — 99900035 HC TECH TIME PER 15 MIN (STAT)

## 2025-09-03 PROCEDURE — 94760 N-INVAS EAR/PLS OXIMETRY 1: CPT

## 2025-09-03 PROCEDURE — 63600175 PHARM REV CODE 636 W HCPCS: Performed by: STUDENT IN AN ORGANIZED HEALTH CARE EDUCATION/TRAINING PROGRAM

## 2025-09-03 RX ORDER — HEPARIN 100 UNIT/ML
500 SYRINGE INTRAVENOUS
Status: CANCELLED | OUTPATIENT
Start: 2025-09-03

## 2025-09-03 RX ORDER — SODIUM CHLORIDE 0.9 % (FLUSH) 0.9 %
10 SYRINGE (ML) INJECTION
Status: CANCELLED | OUTPATIENT
Start: 2025-09-03

## 2025-09-03 RX ORDER — MIDAZOLAM HYDROCHLORIDE 1 MG/ML
INJECTION, SOLUTION INTRAMUSCULAR; INTRAVENOUS
Status: COMPLETED | OUTPATIENT
Start: 2025-09-03 | End: 2025-09-03

## 2025-09-03 RX ORDER — SODIUM CHLORIDE 9 MG/ML
INJECTION, SOLUTION INTRAVENOUS CONTINUOUS
Status: DISCONTINUED | OUTPATIENT
Start: 2025-09-03 | End: 2025-09-04 | Stop reason: HOSPADM

## 2025-09-03 RX ORDER — FENTANYL CITRATE 50 UG/ML
INJECTION, SOLUTION INTRAMUSCULAR; INTRAVENOUS
Status: COMPLETED | OUTPATIENT
Start: 2025-09-03 | End: 2025-09-03

## 2025-09-03 RX ORDER — HEPARIN SODIUM 1000 [USP'U]/ML
INJECTION, SOLUTION INTRAVENOUS; SUBCUTANEOUS
Status: COMPLETED | OUTPATIENT
Start: 2025-09-03 | End: 2025-09-03

## 2025-09-03 RX ADMIN — FENTANYL CITRATE 50 MCG: 50 INJECTION, SOLUTION INTRAMUSCULAR; INTRAVENOUS at 01:09

## 2025-09-03 RX ADMIN — HEPARIN SODIUM 500 UNITS: 1000 INJECTION, SOLUTION INTRAVENOUS; SUBCUTANEOUS at 01:09

## 2025-09-03 RX ADMIN — MIDAZOLAM HYDROCHLORIDE 1 MG: 2 INJECTION, SOLUTION INTRAMUSCULAR; INTRAVENOUS at 01:09

## 2025-09-04 ENCOUNTER — INFUSION (OUTPATIENT)
Dept: INFUSION THERAPY | Facility: HOSPITAL | Age: 70
End: 2025-09-04
Attending: INTERNAL MEDICINE
Payer: MEDICARE

## 2025-09-04 VITALS
DIASTOLIC BLOOD PRESSURE: 85 MMHG | OXYGEN SATURATION: 97 % | TEMPERATURE: 98 F | RESPIRATION RATE: 18 BRPM | HEART RATE: 79 BPM | SYSTOLIC BLOOD PRESSURE: 154 MMHG

## 2025-09-04 DIAGNOSIS — N17.9 ACUTE KIDNEY INJURY: Primary | ICD-10-CM

## 2025-09-04 DIAGNOSIS — Z98.890 HISTORY OF INSERTION OF TUNNELED CENTRAL VENOUS CATHETER (CVC) WITH PORT: Primary | ICD-10-CM

## 2025-09-04 PROCEDURE — A4216 STERILE WATER/SALINE, 10 ML: HCPCS

## 2025-09-04 PROCEDURE — 25000003 PHARM REV CODE 250

## 2025-09-04 PROCEDURE — 96360 HYDRATION IV INFUSION INIT: CPT

## 2025-09-04 RX ORDER — LIDOCAINE AND PRILOCAINE 25; 25 MG/G; MG/G
CREAM TOPICAL
Qty: 30 G | Refills: 2 | Status: SHIPPED | OUTPATIENT
Start: 2025-09-04

## 2025-09-04 RX ORDER — SODIUM CHLORIDE 0.9 % (FLUSH) 0.9 %
10 SYRINGE (ML) INJECTION
Status: DISCONTINUED | OUTPATIENT
Start: 2025-09-04 | End: 2025-09-04 | Stop reason: HOSPADM

## 2025-09-04 RX ORDER — HEPARIN 100 UNIT/ML
500 SYRINGE INTRAVENOUS
Status: DISCONTINUED | OUTPATIENT
Start: 2025-09-04 | End: 2025-09-04 | Stop reason: HOSPADM

## 2025-09-04 RX ADMIN — SODIUM CHLORIDE 1000 ML: 9 INJECTION, SOLUTION INTRAVENOUS at 12:09

## 2025-09-04 RX ADMIN — Medication 10 ML: at 01:09

## 2025-09-05 ENCOUNTER — DOCUMENTATION ONLY (OUTPATIENT)
Dept: HEMATOLOGY/ONCOLOGY | Facility: CLINIC | Age: 70
End: 2025-09-05
Payer: MEDICARE

## 2025-09-05 ENCOUNTER — TELEPHONE (OUTPATIENT)
Dept: TRANSFUSION MEDICINE | Facility: HOSPITAL | Age: 70
End: 2025-09-05
Payer: MEDICARE

## 2025-09-05 ENCOUNTER — OFFICE VISIT (OUTPATIENT)
Dept: HEMATOLOGY/ONCOLOGY | Facility: CLINIC | Age: 70
End: 2025-09-05
Payer: MEDICARE

## 2025-09-05 VITALS
SYSTOLIC BLOOD PRESSURE: 117 MMHG | HEART RATE: 91 BPM | HEIGHT: 68 IN | TEMPERATURE: 99 F | DIASTOLIC BLOOD PRESSURE: 55 MMHG | OXYGEN SATURATION: 96 % | WEIGHT: 141 LBS | BODY MASS INDEX: 21.37 KG/M2

## 2025-09-05 DIAGNOSIS — N18.31 STAGE 3A CHRONIC KIDNEY DISEASE: ICD-10-CM

## 2025-09-05 DIAGNOSIS — Z79.52 LONG TERM (CURRENT) USE OF SYSTEMIC STEROIDS: ICD-10-CM

## 2025-09-05 DIAGNOSIS — D61.818 PANCYTOPENIA: ICD-10-CM

## 2025-09-05 DIAGNOSIS — E83.42 HYPOMAGNESEMIA: ICD-10-CM

## 2025-09-05 DIAGNOSIS — Z94.81 S/P ALLOGENEIC BONE MARROW TRANSPLANT: ICD-10-CM

## 2025-09-05 DIAGNOSIS — D53.9 NUTRITIONAL ANEMIA, UNSPECIFIED: ICD-10-CM

## 2025-09-05 DIAGNOSIS — D89.810 ACUTE GVHD: ICD-10-CM

## 2025-09-05 DIAGNOSIS — R53.81 PHYSICAL DECONDITIONING: ICD-10-CM

## 2025-09-05 DIAGNOSIS — F41.9 ANXIETY: ICD-10-CM

## 2025-09-05 DIAGNOSIS — Z94.84 IMMUNOCOMPROMISED STATE ASSOCIATED WITH STEM CELL TRANSPLANT: ICD-10-CM

## 2025-09-05 DIAGNOSIS — D84.822 IMMUNOCOMPROMISED STATE ASSOCIATED WITH STEM CELL TRANSPLANT: ICD-10-CM

## 2025-09-05 DIAGNOSIS — R63.0 ANOREXIA: ICD-10-CM

## 2025-09-05 DIAGNOSIS — Z86.2 PERSONAL HISTORY OF DISEASES OF THE BLOOD AND BLOOD-FORMING ORGANS AND CERTAIN DISORDERS INVOLVING THE IMMUNE MECHANISM: ICD-10-CM

## 2025-09-05 DIAGNOSIS — I10 PRIMARY HYPERTENSION: ICD-10-CM

## 2025-09-05 DIAGNOSIS — D46.9 MYELODYSPLASTIC SYNDROME: Primary | ICD-10-CM

## 2025-09-05 DIAGNOSIS — N17.9 AKI (ACUTE KIDNEY INJURY): ICD-10-CM

## 2025-09-05 DIAGNOSIS — N40.0 BENIGN PROSTATIC HYPERPLASIA, UNSPECIFIED WHETHER LOWER URINARY TRACT SYMPTOMS PRESENT: ICD-10-CM

## 2025-09-05 DIAGNOSIS — G25.81 RESTLESS LEG SYNDROME: ICD-10-CM

## 2025-09-05 PROCEDURE — 99999 PR PBB SHADOW E&M-EST. PATIENT-LVL V: CPT | Mod: PBBFAC,,,

## 2025-09-05 RX ORDER — ROPINIROLE 0.5 MG/1
1 TABLET, FILM COATED ORAL NIGHTLY
Qty: 60 TABLET | Refills: 11 | Status: SHIPPED | OUTPATIENT
Start: 2025-09-05 | End: 2026-09-05

## 2025-09-05 RX ORDER — PREDNISONE 10 MG/1
TABLET ORAL
Qty: 90 TABLET | Refills: 0 | Status: SHIPPED | OUTPATIENT
Start: 2025-09-05

## (undated) DEVICE — DECANTER FLUID TRNSF WHITE 9IN

## (undated) DEVICE — NDL HYPO REG 25G X 1 1/2

## (undated) DEVICE — APPLICATOR CHLORAPREP ORN 26ML

## (undated) DEVICE — BLADE SURG CARBON STEEL SZ11

## (undated) DEVICE — CONTAINER SPECIMEN OR STER 4OZ

## (undated) DEVICE — SYR DISP LL 5CC

## (undated) DEVICE — SUT MCRYL PLUS 4-0 PS2 27IN

## (undated) DEVICE — ELECTRODE REM PLYHSV RETURN 9

## (undated) DEVICE — DRAPE T THYROID STERILE

## (undated) DEVICE — ADHESIVE DERMABOND ADVANCED

## (undated) DEVICE — DRESSING ANTIMICROBIAL 1 INCH

## (undated) DEVICE — TRAY MINOR GEN SURG OMC

## (undated) DEVICE — SUT PROLENE 2-0 30 SH

## (undated) DEVICE — TIP YANKAUERS BULB NO VENT

## (undated) DEVICE — DRAPE C-ARM ELAS CLIP 42X120IN

## (undated) DEVICE — SOL NACL 0.9% INJ PF/50151

## (undated) DEVICE — SYR ONLY LUER LOCK 20CC